# Patient Record
Sex: MALE | Race: WHITE | Employment: FULL TIME | URBAN - METROPOLITAN AREA
[De-identification: names, ages, dates, MRNs, and addresses within clinical notes are randomized per-mention and may not be internally consistent; named-entity substitution may affect disease eponyms.]

---

## 2017-01-05 ENCOUNTER — LAB ENCOUNTER (OUTPATIENT)
Dept: LAB | Age: 47
End: 2017-01-05
Attending: INTERNAL MEDICINE
Payer: COMMERCIAL

## 2017-01-05 ENCOUNTER — PRIOR ORIGINAL RECORDS (OUTPATIENT)
Dept: OTHER | Age: 47
End: 2017-01-05

## 2017-01-05 DIAGNOSIS — E78.00 PURE HYPERCHOLESTEROLEMIA: Primary | ICD-10-CM

## 2017-01-05 LAB
ALBUMIN SERPL-MCNC: 4.2 G/DL (ref 3.5–4.8)
ALP LIVER SERPL-CCNC: 77 U/L (ref 45–117)
ALT SERPL-CCNC: 43 U/L (ref 17–63)
AST SERPL-CCNC: 24 U/L (ref 15–41)
BILIRUB SERPL-MCNC: 0.4 MG/DL (ref 0.1–2)
BUN BLD-MCNC: 18 MG/DL (ref 8–20)
CALCIUM BLD-MCNC: 9.3 MG/DL (ref 8.3–10.3)
CHLORIDE: 106 MMOL/L (ref 101–111)
CHOLEST SMN-MCNC: 187 MG/DL (ref ?–200)
CO2: 27 MMOL/L (ref 22–32)
CREAT BLD-MCNC: 0.97 MG/DL (ref 0.7–1.3)
GLUCOSE BLD-MCNC: 99 MG/DL (ref 70–99)
HDLC SERPL-MCNC: 51 MG/DL (ref 45–?)
HDLC SERPL: 3.67 {RATIO} (ref ?–4.97)
LDLC SERPL CALC-MCNC: 107 MG/DL (ref ?–130)
M PROTEIN MFR SERPL ELPH: 7.2 G/DL (ref 6.1–8.3)
NONHDLC SERPL-MCNC: 136 MG/DL (ref ?–130)
POTASSIUM SERPL-SCNC: 4.3 MMOL/L (ref 3.6–5.1)
SODIUM SERPL-SCNC: 138 MMOL/L (ref 136–144)
TRIGLYCERIDES: 144 MG/DL (ref ?–150)
VLDL: 29 MG/DL (ref 5–40)

## 2017-01-05 PROCEDURE — 80053 COMPREHEN METABOLIC PANEL: CPT

## 2017-01-05 PROCEDURE — 36415 COLL VENOUS BLD VENIPUNCTURE: CPT

## 2017-01-05 PROCEDURE — 80061 LIPID PANEL: CPT

## 2017-01-06 ENCOUNTER — PRIOR ORIGINAL RECORDS (OUTPATIENT)
Dept: OTHER | Age: 47
End: 2017-01-06

## 2017-01-06 LAB
ALKALINE PHOSPHATATE(ALK PHOS): 77 IU/L
BILIRUBIN TOTAL: 0.4 MG/DL
BUN: 18 MG/DL
CALCIUM: 9.3 MG/DL
CHLORIDE: 106 MEQ/L
CHOLESTEROL, TOTAL: 187 MG/DL
CREATININE, SERUM: 0.97 MG/DL
GLUCOSE: 99 MG/DL
HDL CHOLESTEROL: 51 MG/DL
LDL CHOLESTEROL: 107 MG/DL
POTASSIUM, SERUM: 4.3 MEQ/L
PROTEIN, TOTAL: 7.2 G/DL
SGOT (AST): 24 IU/L
SGPT (ALT): 43 IU/L
SODIUM: 138 MEQ/L
TRIGLYCERIDES: 144 MG/DL

## 2017-01-09 ENCOUNTER — PRIOR ORIGINAL RECORDS (OUTPATIENT)
Dept: OTHER | Age: 47
End: 2017-01-09

## 2017-01-12 ENCOUNTER — PRIOR ORIGINAL RECORDS (OUTPATIENT)
Dept: OTHER | Age: 47
End: 2017-01-12

## 2017-01-13 ENCOUNTER — PRIOR ORIGINAL RECORDS (OUTPATIENT)
Dept: OTHER | Age: 47
End: 2017-01-13

## 2017-03-13 ENCOUNTER — OFFICE VISIT (OUTPATIENT)
Dept: FAMILY MEDICINE CLINIC | Facility: CLINIC | Age: 47
End: 2017-03-13

## 2017-03-13 VITALS
DIASTOLIC BLOOD PRESSURE: 100 MMHG | HEART RATE: 64 BPM | RESPIRATION RATE: 18 BRPM | OXYGEN SATURATION: 98 % | BODY MASS INDEX: 37 KG/M2 | SYSTOLIC BLOOD PRESSURE: 170 MMHG | WEIGHT: 285 LBS | TEMPERATURE: 98 F

## 2017-03-13 DIAGNOSIS — J40 BRONCHITIS: Primary | ICD-10-CM

## 2017-03-13 DIAGNOSIS — Z95.1 S/P CABG X 4: ICD-10-CM

## 2017-03-13 DIAGNOSIS — E66.01 MORBID OBESITY DUE TO EXCESS CALORIES (HCC): ICD-10-CM

## 2017-03-13 DIAGNOSIS — I10 ESSENTIAL HYPERTENSION: ICD-10-CM

## 2017-03-13 PROCEDURE — 99214 OFFICE O/P EST MOD 30 MIN: CPT | Performed by: FAMILY MEDICINE

## 2017-03-13 RX ORDER — METOPROLOL SUCCINATE 50 MG/1
50 TABLET, EXTENDED RELEASE ORAL DAILY
Qty: 30 TABLET | Refills: 3 | Status: SHIPPED | OUTPATIENT
Start: 2017-03-13 | End: 2017-04-04

## 2017-03-13 RX ORDER — AZITHROMYCIN 250 MG/1
TABLET, FILM COATED ORAL
Qty: 6 TABLET | Refills: 0 | Status: SHIPPED | OUTPATIENT
Start: 2017-03-13 | End: 2017-04-18 | Stop reason: ALTCHOICE

## 2017-03-13 NOTE — PROGRESS NOTES
HPI:   Frantz Thayer is a 51year old male who presents for upper respiratory symptoms for  2  weeks.  Patient reports sore throat, congestion, clear and yellow colored nasal discharge, low grade fever, dry cough, chest pain from coughing, sinus pain, antony History    APPENDECTOMY      CABG      Comment On 8/16/13: CABG x 4 with LIMA to LAD and SVG to diagonal, OM and PDA    CARDIAC CATH LAB      Comment On 8/14/2013: cardiac cath showed 3-vessel disease      Family History   Problem Relation Age of Onset   • 3    3. Morbid obesity due to excess calories (HCC)  -diet controlled, stable, BP control. 4. S/P CABG x 4  -stable, supportive care, labs in 1-2 months. The patient indicates understanding of these issues and agrees to the plan.   The patient is

## 2017-03-17 ENCOUNTER — NURSE ONLY (OUTPATIENT)
Dept: FAMILY MEDICINE CLINIC | Facility: CLINIC | Age: 47
End: 2017-03-17

## 2017-03-17 VITALS — DIASTOLIC BLOOD PRESSURE: 80 MMHG | SYSTOLIC BLOOD PRESSURE: 122 MMHG

## 2017-04-04 DIAGNOSIS — I10 ESSENTIAL HYPERTENSION: Primary | ICD-10-CM

## 2017-04-04 RX ORDER — METOPROLOL SUCCINATE 50 MG/1
50 TABLET, EXTENDED RELEASE ORAL DAILY
Qty: 90 TABLET | Refills: 1 | Status: SHIPPED | OUTPATIENT
Start: 2017-04-04 | End: 2017-09-30

## 2017-04-18 ENCOUNTER — OFFICE VISIT (OUTPATIENT)
Dept: FAMILY MEDICINE CLINIC | Facility: CLINIC | Age: 47
End: 2017-04-18

## 2017-04-18 VITALS
HEART RATE: 70 BPM | WEIGHT: 289 LBS | BODY MASS INDEX: 37 KG/M2 | SYSTOLIC BLOOD PRESSURE: 130 MMHG | TEMPERATURE: 98 F | DIASTOLIC BLOOD PRESSURE: 80 MMHG | OXYGEN SATURATION: 98 % | RESPIRATION RATE: 16 BRPM

## 2017-04-18 DIAGNOSIS — K52.9 ACUTE GASTROENTERITIS: Primary | ICD-10-CM

## 2017-04-18 PROCEDURE — 99213 OFFICE O/P EST LOW 20 MIN: CPT | Performed by: EMERGENCY MEDICINE

## 2017-04-18 RX ORDER — ATORVASTATIN CALCIUM 20 MG/1
TABLET, FILM COATED ORAL
Refills: 1 | COMMUNITY
Start: 2017-04-08 | End: 2018-04-07

## 2017-04-18 RX ORDER — ONDANSETRON 4 MG/1
TABLET, ORALLY DISINTEGRATING ORAL EVERY 8 HOURS PRN
Qty: 20 TABLET | Refills: 0 | Status: SHIPPED | OUTPATIENT
Start: 2017-04-18 | End: 2017-04-25

## 2017-04-18 NOTE — PROGRESS NOTES
Chief Complaint:   Patient presents with:  Abdominal Pain: Diareha, Weakness,     HPI:   This is a 51year old male C/O 1 day history of diarrhea. Burping more. Mild nausea. No emesis.   Generalized fatigue and  energy  No fever but felt feverish  + daily. Disp: 30 tablet Rfl: 0   [DISCONTINUED] Rivaroxaban (XARELTO) 20 MG Oral Tab Take 1 tablet by mouth daily. Disp: 30 tablet Rfl: 0     No current facility-administered medications on file prior to visit.     Allergies   No Known Allergies    Current O visit:    Acute gastroenteritis  -     ondansetron 4 MG Oral Tablet Dispersible; Take 1-2 tablets (4-8 mg total) by mouth every 8 (eight) hours as needed. plan: Patient is a 58-year-old male presents today with acute viral gastroenteritis.   Appears cli

## 2017-04-18 NOTE — PATIENT INSTRUCTIONS
Push fluids and rest  Bananas rice apple sauce toast diet and advance as tolerated  To Er  If worse like high fever weakness severe abdominal pain etc.  May take OTC Tylenol or Motrin for body aches   May take OTC Imodium for symptoms          Treating Chelsea urine)  Date Last Reviewed: 7/1/2016  © 3432-1693 [facility] [address] [place] [address], [address] [place], [place] [address]. All rights reserved. This information is not intended as a substitute for professional medical care.  Always follow your healthcare profe

## 2017-05-12 ENCOUNTER — HOSPITAL ENCOUNTER (OUTPATIENT)
Dept: GENERAL RADIOLOGY | Age: 47
Discharge: HOME | End: 2017-05-12
Attending: PHYSICIAN ASSISTANT
Payer: COMMERCIAL

## 2017-05-12 ENCOUNTER — OFFICE VISIT (OUTPATIENT)
Dept: FAMILY MEDICINE CLINIC | Facility: CLINIC | Age: 47
End: 2017-05-12

## 2017-05-12 VITALS
TEMPERATURE: 98 F | RESPIRATION RATE: 16 BRPM | HEART RATE: 72 BPM | SYSTOLIC BLOOD PRESSURE: 120 MMHG | WEIGHT: 281 LBS | OXYGEN SATURATION: 97 % | DIASTOLIC BLOOD PRESSURE: 78 MMHG | BODY MASS INDEX: 36 KG/M2

## 2017-05-12 DIAGNOSIS — J20.9 ACUTE BRONCHITIS, UNSPECIFIED ORGANISM: Primary | ICD-10-CM

## 2017-05-12 DIAGNOSIS — M79.672 PAIN IN LEFT FOOT: ICD-10-CM

## 2017-05-12 PROCEDURE — 73630 X-RAY EXAM OF FOOT: CPT | Performed by: PHYSICIAN ASSISTANT

## 2017-05-12 PROCEDURE — 99214 OFFICE O/P EST MOD 30 MIN: CPT | Performed by: PHYSICIAN ASSISTANT

## 2017-05-12 RX ORDER — AZITHROMYCIN 250 MG/1
TABLET, FILM COATED ORAL
Qty: 6 TABLET | Refills: 0 | Status: SHIPPED | OUTPATIENT
Start: 2017-05-12 | End: 2017-06-06

## 2017-05-12 RX ORDER — PROMETHAZINE HYDROCHLORIDE AND CODEINE PHOSPHATE 6.25; 1 MG/5ML; MG/5ML
5 SYRUP ORAL NIGHTLY PRN
Qty: 100 ML | Refills: 0 | Status: SHIPPED | OUTPATIENT
Start: 2017-05-12 | End: 2017-06-06

## 2017-05-12 NOTE — PROGRESS NOTES
CHIEF COMPLAINT:   Patient presents with:  Cough: foot    HPI:   Daylin Flower. is a 51year old male who presents for lower respiratory symptoms for  1 weeks.  Patient reports cough with thick, brownish yellow colored sputum, cough is keeping pt up at Dx in 8/2013: CT chest showed small bilateral fissural-based lung nodules less than 1 cm          Past Surgical History    APPENDECTOMY      CABG      Comment On 8/16/13: CABG x 4 with LIMA to LAD and SVG to diagonal, OM and PDA    CARDIAC CATH LAB LYMPH: No lymphadenopathy. ASSESSMENT AND PLAN:   Sol Gonzalez. is a 51year old male who presents with     ASSESSMENT: Acute bronchitis, unspecified organism  (primary encounter diagnosis)  Pain in left foot     Xray of left foot- 1.  Osseous st Bronchitis often occurs with a cold or the flu virus. The airways become inflamed (red and swollen). There is a deep “hacking” cough from the extra mucus.  Other symptoms may include:  · Wheezing  · Chest discomfort  · Shortness of breath  · Mild fever  A s · Ask your provider about using cough medicine, pain and fever medicine, or a decongestant. Antibiotics  Most cases of bronchitis are caused by cold or flu viruses. Antibiotics don’t treat viral illness.  Taking antibiotics when they are not needed increas

## 2017-06-06 ENCOUNTER — OFFICE VISIT (OUTPATIENT)
Dept: FAMILY MEDICINE CLINIC | Facility: CLINIC | Age: 47
End: 2017-06-06

## 2017-06-06 VITALS
SYSTOLIC BLOOD PRESSURE: 120 MMHG | HEART RATE: 60 BPM | RESPIRATION RATE: 16 BRPM | DIASTOLIC BLOOD PRESSURE: 78 MMHG | TEMPERATURE: 99 F | BODY MASS INDEX: 36.06 KG/M2 | HEIGHT: 74 IN | WEIGHT: 281 LBS

## 2017-06-06 DIAGNOSIS — J20.8 ACUTE BRONCHITIS DUE TO OTHER SPECIFIED ORGANISMS: ICD-10-CM

## 2017-06-06 DIAGNOSIS — Z13.228 SCREENING FOR ENDOCRINE, NUTRITIONAL, METABOLIC AND IMMUNITY DISORDER: Primary | ICD-10-CM

## 2017-06-06 DIAGNOSIS — Z13.21 SCREENING FOR ENDOCRINE, NUTRITIONAL, METABOLIC AND IMMUNITY DISORDER: Primary | ICD-10-CM

## 2017-06-06 DIAGNOSIS — M77.9 BONE SPUR: ICD-10-CM

## 2017-06-06 DIAGNOSIS — Z13.29 SCREENING FOR ENDOCRINE, NUTRITIONAL, METABOLIC AND IMMUNITY DISORDER: Primary | ICD-10-CM

## 2017-06-06 DIAGNOSIS — R60.0 EDEMA OF LEFT LOWER EXTREMITY: ICD-10-CM

## 2017-06-06 DIAGNOSIS — Z13.0 SCREENING FOR ENDOCRINE, NUTRITIONAL, METABOLIC AND IMMUNITY DISORDER: Primary | ICD-10-CM

## 2017-06-06 PROCEDURE — 99214 OFFICE O/P EST MOD 30 MIN: CPT | Performed by: FAMILY MEDICINE

## 2017-06-06 RX ORDER — METHYLPREDNISOLONE 4 MG/1
TABLET ORAL
Qty: 1 KIT | Refills: 0 | Status: SHIPPED | OUTPATIENT
Start: 2017-06-06 | End: 2017-07-06 | Stop reason: ALTCHOICE

## 2017-06-06 RX ORDER — DICLOFENAC SODIUM 75 MG/1
75 TABLET, DELAYED RELEASE ORAL 2 TIMES DAILY PRN
Qty: 60 TABLET | Refills: 1 | Status: SHIPPED | OUTPATIENT
Start: 2017-06-06 | End: 2017-08-06

## 2017-06-07 NOTE — PROGRESS NOTES
Chief Complaint:   Patient presents with:  Cough: Still present   Foot Pain: Still having issues     HPI:   This is a 51year old male presenting for follow-up after he was diagnosed with bronchitis.   Patient has a history of chronic bronchitis recently wa Family History:  Family History   Problem Relation Age of Onset   • Diabetes Neg      Allergies:  No Known Allergies  Current Meds:    Current Outpatient Prescriptions:  methylPREDNISolone (MEDROL) 4 MG Oral Tablet Therapy Pack As directed.  Disp: 1 kit Negative for dizziness, weakness, light-headedness and headaches. Hematological: Negative for adenopathy. Does not bruise/bleed easily. Psychiatric/Behavioral: Negative for suicidal ideas, behavioral problems, sleep disturbance and agitation.  The patie noted. He is not diaphoretic. Psychiatric: He has a normal mood and affect. His behavior is normal. Judgment and thought content normal.   left heel tenderness, mild swelling to area, with radiation to left posterior leg.      ASSESSMENT AND PLAN:   Diagn

## 2017-06-30 ENCOUNTER — HOSPITAL ENCOUNTER (OUTPATIENT)
Dept: ULTRASOUND IMAGING | Facility: HOSPITAL | Age: 47
Discharge: HOME | End: 2017-06-30
Attending: FAMILY MEDICINE
Payer: COMMERCIAL

## 2017-06-30 ENCOUNTER — TELEPHONE (OUTPATIENT)
Dept: FAMILY MEDICINE CLINIC | Facility: CLINIC | Age: 47
End: 2017-06-30

## 2017-06-30 DIAGNOSIS — R60.0 EDEMA OF LEFT LOWER EXTREMITY: ICD-10-CM

## 2017-06-30 DIAGNOSIS — I70.90 ATHEROSCLEROSIS: Primary | ICD-10-CM

## 2017-06-30 PROCEDURE — 93971 EXTREMITY STUDY: CPT | Performed by: FAMILY MEDICINE

## 2017-06-30 PROCEDURE — 93923 UPR/LXTR ART STDY 3+ LVLS: CPT | Performed by: FAMILY MEDICINE

## 2017-06-30 NOTE — TELEPHONE ENCOUNTER
----- Message from Ronnie Leone MD sent at 6/30/2017  1:09 PM CDT -----  Patient's noted to have atherosclerosis, please refer to Dr. Jairon Newell, vascular.

## 2017-06-30 NOTE — TELEPHONE ENCOUNTER
Left a message for patient to call office regarding the referral, but informed him we would check back next week with him if we do not hear back from him today.

## 2017-06-30 NOTE — TELEPHONE ENCOUNTER
Discussed results and referral with patient.   Information for Dr. Herber Patel sent to him via my chart per his request.

## 2017-07-05 ENCOUNTER — TELEPHONE (OUTPATIENT)
Dept: FAMILY MEDICINE CLINIC | Facility: CLINIC | Age: 47
End: 2017-07-05

## 2017-07-05 NOTE — TELEPHONE ENCOUNTER
Dr. Olivia Donaldson.  [facility] [address]  Vascular surgery   [address] [place] [address], [place], [address] [place] [address]   [phone]

## 2017-07-05 NOTE — TELEPHONE ENCOUNTER
Pt informed and info sent thru [address] Pt states he still has his wet,wheezy, yellow foamy cough. Pr informed he needs to be seen, its been a month, no better, little worse. Offered appts today and [facility]. Pt scheduled for tomorrow.

## 2017-07-06 ENCOUNTER — OFFICE VISIT (OUTPATIENT)
Dept: FAMILY MEDICINE CLINIC | Facility: CLINIC | Age: 47
End: 2017-07-06

## 2017-07-06 ENCOUNTER — HOSPITAL ENCOUNTER (OUTPATIENT)
Dept: GENERAL RADIOLOGY | Age: 47
Discharge: HOME | End: 2017-07-06
Attending: FAMILY MEDICINE
Payer: COMMERCIAL

## 2017-07-06 VITALS
SYSTOLIC BLOOD PRESSURE: 137 MMHG | OXYGEN SATURATION: 96 % | BODY MASS INDEX: 36.06 KG/M2 | WEIGHT: 281 LBS | RESPIRATION RATE: 16 BRPM | HEART RATE: 62 BPM | DIASTOLIC BLOOD PRESSURE: 84 MMHG | TEMPERATURE: 99 F | HEIGHT: 74 IN

## 2017-07-06 DIAGNOSIS — R06.09 OTHER FORM OF DYSPNEA: ICD-10-CM

## 2017-07-06 DIAGNOSIS — J20.9 BRONCHITIS, ACUTE, WITH BRONCHOSPASM: ICD-10-CM

## 2017-07-06 DIAGNOSIS — E78.5 HYPERLIPIDEMIA WITH TARGET LOW DENSITY LIPOPROTEIN (LDL) CHOLESTEROL LESS THAN 70 MG/DL: ICD-10-CM

## 2017-07-06 DIAGNOSIS — J20.9 BRONCHITIS, ACUTE, WITH BRONCHOSPASM: Primary | ICD-10-CM

## 2017-07-06 PROCEDURE — 99214 OFFICE O/P EST MOD 30 MIN: CPT | Performed by: FAMILY MEDICINE

## 2017-07-06 PROCEDURE — 71020 XR CHEST PA + LAT CHEST (CPT=71020): CPT | Performed by: FAMILY MEDICINE

## 2017-07-06 RX ORDER — AMOXICILLIN AND CLAVULANATE POTASSIUM 875; 125 MG/1; MG/1
1 TABLET, FILM COATED ORAL 2 TIMES DAILY
Qty: 20 TABLET | Refills: 0 | Status: SHIPPED | OUTPATIENT
Start: 2017-07-06 | End: 2017-07-16

## 2017-07-07 ENCOUNTER — TELEPHONE (OUTPATIENT)
Dept: FAMILY MEDICINE CLINIC | Facility: CLINIC | Age: 47
End: 2017-07-07

## 2017-07-07 NOTE — TELEPHONE ENCOUNTER
I called pt at [phone] and verified 2 patient identifiers: name & . I discussed results and recommendations at length with patient. All questions answered.

## 2017-07-07 NOTE — PROGRESS NOTES
Chief Complaint:   Patient presents with:  URI: mary. Pt states he feels worse once antibotic was finish    HPI:   This is a 51year old male presenting for follow up, minimal improvement with zithromax and oral steroids.  He reports no recent fevers or chi Packs/day: 1.00      Years: 27.00        Types: Cigarettes     Quit date: 8/15/2013  Smokeless tobacco: Never Used                      Alcohol use:  No              Family History:  Family History   Problem Relation Age of Onset   • Diabetes Neg pallor, rash and wound. Allergic/Immunologic: Negative for environmental allergies, food allergies and immunocompromised state. Neurological: Negative for dizziness, vertigo, weakness, light-headedness, numbness and headaches.    Hematological: Negative He has no cervical adenopathy. Neurological: He is alert and oriented to person, place, and time. No cranial nerve deficit or motor deficit. Gait normal.   Skin: Skin is warm and dry. No lesion and no rash noted. He is not diaphoretic.    Psychiatric: He 187 RIGHT TIBIALIS POSTERIOR:    191 RIGHT DORSALIS PEDIS:        197 RIGHT GREAT TOE:             139 RIGHT ANKLE/BRACHIAL INDEX:  1.42 RIGHT TOE/BRACHIAL INDEX:    1.00  LEFT BRACHIAL:               137 LEFT UPPER THIGH:            Greater than 2 involving the left lower extremity. Dictated by: Judy Huber MD on 6/30/2017 at 8:46     Approved by: Judy Huber MD          -     XR CHEST PA + LAT CHEST (CPT=71020); Future  -     Amoxicillin-Pot Clavulanate 875-125 MG Oral Tab;  Take 1 tablet by mouth

## 2017-07-18 ENCOUNTER — APPOINTMENT (OUTPATIENT)
Dept: CT IMAGING | Age: 47
End: 2017-07-18
Attending: EMERGENCY MEDICINE
Payer: COMMERCIAL

## 2017-07-18 ENCOUNTER — APPOINTMENT (OUTPATIENT)
Dept: GENERAL RADIOLOGY | Age: 47
End: 2017-07-18
Attending: EMERGENCY MEDICINE
Payer: COMMERCIAL

## 2017-07-18 ENCOUNTER — HOSPITAL ENCOUNTER (EMERGENCY)
Age: 47
Discharge: HOME | End: 2017-07-18
Attending: EMERGENCY MEDICINE
Payer: COMMERCIAL

## 2017-07-18 VITALS
WEIGHT: 280 LBS | HEIGHT: 74 IN | HEART RATE: 60 BPM | SYSTOLIC BLOOD PRESSURE: 138 MMHG | DIASTOLIC BLOOD PRESSURE: 70 MMHG | RESPIRATION RATE: 18 BRPM | OXYGEN SATURATION: 98 % | TEMPERATURE: 98 F | BODY MASS INDEX: 35.94 KG/M2

## 2017-07-18 DIAGNOSIS — R19.7 DIARRHEA, UNSPECIFIED TYPE: ICD-10-CM

## 2017-07-18 DIAGNOSIS — R10.30 LOWER ABDOMINAL PAIN: Primary | ICD-10-CM

## 2017-07-18 LAB
ALBUMIN SERPL-MCNC: 3.8 G/DL (ref 3.5–4.8)
ALP LIVER SERPL-CCNC: 85 U/L (ref 45–117)
ALT SERPL-CCNC: 56 U/L (ref 17–63)
AST SERPL-CCNC: 34 U/L (ref 15–41)
BASOPHILS # BLD AUTO: 0.04 X10(3) UL (ref 0–0.1)
BASOPHILS NFR BLD AUTO: 0.5 %
BILIRUB SERPL-MCNC: 0.3 MG/DL (ref 0.1–2)
BILIRUB UR QL STRIP.AUTO: NEGATIVE
BUN BLD-MCNC: 21 MG/DL (ref 8–20)
CALCIUM BLD-MCNC: 8.7 MG/DL (ref 8.3–10.3)
CHLORIDE: 107 MMOL/L (ref 101–111)
CLARITY UR REFRACT.AUTO: CLEAR
CO2: 27 MMOL/L (ref 22–32)
COLOR UR AUTO: YELLOW
CREAT BLD-MCNC: 0.93 MG/DL (ref 0.7–1.3)
EOSINOPHIL # BLD AUTO: 0.28 X10(3) UL (ref 0–0.3)
EOSINOPHIL NFR BLD AUTO: 3.5 %
ERYTHROCYTE [DISTWIDTH] IN BLOOD BY AUTOMATED COUNT: 12.8 % (ref 11.5–16)
GLUCOSE BLD-MCNC: 124 MG/DL (ref 70–99)
GLUCOSE UR STRIP.AUTO-MCNC: NEGATIVE MG/DL
HCT VFR BLD AUTO: 40.1 % (ref 37–53)
HGB BLD-MCNC: 13.3 G/DL (ref 13–17)
IMMATURE GRANULOCYTE COUNT: 0.07 X10(3) UL (ref 0–1)
IMMATURE GRANULOCYTE RATIO %: 0.9 %
KETONES UR STRIP.AUTO-MCNC: NEGATIVE MG/DL
LEUKOCYTE ESTERASE UR QL STRIP.AUTO: NEGATIVE
LIPASE: 482 U/L (ref 73–393)
LYMPHOCYTES # BLD AUTO: 1.94 X10(3) UL (ref 0.9–4)
LYMPHOCYTES NFR BLD AUTO: 24 %
M PROTEIN MFR SERPL ELPH: 6.6 G/DL (ref 6.1–8.3)
MCH RBC QN AUTO: 30.4 PG (ref 27–33.2)
MCHC RBC AUTO-ENTMCNC: 33.2 G/DL (ref 31–37)
MCV RBC AUTO: 91.8 FL (ref 80–99)
MONOCYTES # BLD AUTO: 0.46 X10(3) UL (ref 0.1–0.6)
MONOCYTES NFR BLD AUTO: 5.7 %
NEUTROPHIL ABS PRELIM: 5.31 X10 (3) UL (ref 1.3–6.7)
NEUTROPHILS # BLD AUTO: 5.31 X10(3) UL (ref 1.3–6.7)
NEUTROPHILS NFR BLD AUTO: 65.4 %
NITRITE UR QL STRIP.AUTO: NEGATIVE
PH UR STRIP.AUTO: 7 [PH] (ref 4.5–8)
PLATELET # BLD AUTO: 227 10(3)UL (ref 150–450)
POTASSIUM SERPL-SCNC: 3.7 MMOL/L (ref 3.6–5.1)
PRO-BETA NATRIURETIC PEPTIDE: 27 PG/ML (ref ?–125)
PROT UR STRIP.AUTO-MCNC: NEGATIVE MG/DL
RBC # BLD AUTO: 4.37 X10(6)UL (ref 4.3–5.7)
RBC UR QL AUTO: NEGATIVE
RED CELL DISTRIBUTION WIDTH-SD: 42.9 FL (ref 35.1–46.3)
SODIUM SERPL-SCNC: 141 MMOL/L (ref 136–144)
SP GR UR STRIP.AUTO: 1.02 (ref 1–1.03)
TROPONIN: <0.046 NG/ML (ref ?–0.05)
UROBILINOGEN UR STRIP.AUTO-MCNC: 0.2 MG/DL
WBC # BLD AUTO: 8.1 X10(3) UL (ref 4–13)

## 2017-07-18 PROCEDURE — 80053 COMPREHEN METABOLIC PANEL: CPT | Performed by: EMERGENCY MEDICINE

## 2017-07-18 PROCEDURE — 84484 ASSAY OF TROPONIN QUANT: CPT | Performed by: EMERGENCY MEDICINE

## 2017-07-18 PROCEDURE — 99285 EMERGENCY DEPT VISIT HI MDM: CPT

## 2017-07-18 PROCEDURE — 83880 ASSAY OF NATRIURETIC PEPTIDE: CPT | Performed by: EMERGENCY MEDICINE

## 2017-07-18 PROCEDURE — 93005 ELECTROCARDIOGRAM TRACING: CPT

## 2017-07-18 PROCEDURE — 74177 CT ABD & PELVIS W/CONTRAST: CPT | Performed by: EMERGENCY MEDICINE

## 2017-07-18 PROCEDURE — 83690 ASSAY OF LIPASE: CPT | Performed by: EMERGENCY MEDICINE

## 2017-07-18 PROCEDURE — 85025 COMPLETE CBC W/AUTO DIFF WBC: CPT | Performed by: EMERGENCY MEDICINE

## 2017-07-18 PROCEDURE — 81003 URINALYSIS AUTO W/O SCOPE: CPT | Performed by: EMERGENCY MEDICINE

## 2017-07-18 PROCEDURE — 93010 ELECTROCARDIOGRAM REPORT: CPT

## 2017-07-18 PROCEDURE — 96360 HYDRATION IV INFUSION INIT: CPT

## 2017-07-18 PROCEDURE — 71020 XR CHEST PA + LAT CHEST (CPT=71020): CPT | Performed by: EMERGENCY MEDICINE

## 2017-07-18 RX ORDER — DICYCLOMINE HCL 20 MG
20 TABLET ORAL 4 TIMES DAILY PRN
Qty: 30 TABLET | Refills: 0 | Status: SHIPPED | OUTPATIENT
Start: 2017-07-18 | End: 2017-08-17

## 2017-07-18 NOTE — ED PROVIDER NOTES
Patient Seen in: THE [facility] Emergency Department In [place]    History   Patient presents with:  Abdomen/Flank Pain (GI/)    Stated Complaint: ABD pain x 1-2 weeks, pt on antibiotics for \"cough\", pain worse upon eating     HPI    Patient is a 52-year- CABG x 4 with LIMA to LAD and SVG to diagonal, OM and PDA       Past Surgical History:  No date: APPENDECTOMY  No date: CABG      Comment: On 8/16/13: CABG x 4 with LIMA to LAD and SVG                to diagonal, OM and PDA  No date: CARDIAC CATH LAB SpO2 98%   BMI 35.95 kg/m²         Physical Exam    General: Patient is awake, alert in no acute distress. HEENT: Sclera are not icteric. Conjunctivae within normal limits.   Mucous members are moist.   Cardiovascular: Regular rate and rhythm, normal S1- pain worse upon eating  COMPARISON:  Mckenna [facility], XR CHEST PA + LAT CHEST (CPT=71020), 7/06/2017, 13:39.  TECHNIQUE:  PA and lateral chest radiographs were obtained.   PATIENT STATED HISTORY: (As transcribed by Technologist)  For past 2 weeks prodcutiv tree was performed. Pressure measurements were obtained of the lower extremities.   FINDINGS:  Pressures, in mm Hg, are:  RIGHT BRACHIAL:              139 RIGHT UPPER THIGH:           Greater than 254, noncompressible RIGHT LOWER THIGH:           Greater t Left lower extremity SAPHENOFEMORAL JUNCTION:  No reflux. THROMBI:  None visible. COMPRESSION:  Normal compressibility, phasicity, and augmentation. OTHER:  Incidentally noted is a duplicated left mid superficial femoral vein. CONCLUSION:  1.  No sono diverticulitis. Status post appendectomy. No free fluid. ABDOMINAL WALL:  No mass or hernia. URINARY BLADDER:  No visible focal wall thickening, lesion, or calculus. PELVIC NODES:  No adenopathy.   PELVIC ORGANS:  Pelvic organs appropriate for patient ag type    Disposition:  Discharge    Follow-up:  Edgardo Guerrero 58 [address] [facility] [phone]    Schedule an appointment as soon as possible for a visit in 2 days      Raymundo Wing, [address] [facility] [address] [facility]

## 2017-07-18 NOTE — ED INITIAL ASSESSMENT (HPI)
REPORTS UPPER ABD PAIN FOR PAST 7 DAYS THAT OCCURES WHEN HE EATS.   URI SYMPTOMS AND IS NOW ON AUGMENTIN FOR URI

## 2017-07-19 LAB
ATRIAL RATE: 68 BPM
P AXIS: 20 DEGREES
P-R INTERVAL: 166 MS
Q-T INTERVAL: 392 MS
QRS DURATION: 92 MS
QTC CALCULATION (BEZET): 416 MS
R AXIS: 2 DEGREES
T AXIS: 52 DEGREES
VENTRICULAR RATE: 68 BPM

## 2017-07-26 ENCOUNTER — LAB ENCOUNTER (OUTPATIENT)
Dept: LAB | Age: 47
End: 2017-07-26
Attending: FAMILY MEDICINE
Payer: COMMERCIAL

## 2017-07-26 ENCOUNTER — PRIOR ORIGINAL RECORDS (OUTPATIENT)
Dept: OTHER | Age: 47
End: 2017-07-26

## 2017-07-26 DIAGNOSIS — Z13.0 SCREENING FOR ENDOCRINE, NUTRITIONAL, METABOLIC AND IMMUNITY DISORDER: ICD-10-CM

## 2017-07-26 DIAGNOSIS — Z13.21 SCREENING FOR ENDOCRINE, NUTRITIONAL, METABOLIC AND IMMUNITY DISORDER: ICD-10-CM

## 2017-07-26 DIAGNOSIS — E78.00 PURE HYPERCHOLESTEROLEMIA: Primary | ICD-10-CM

## 2017-07-26 DIAGNOSIS — Z13.29 SCREENING FOR ENDOCRINE, NUTRITIONAL, METABOLIC AND IMMUNITY DISORDER: ICD-10-CM

## 2017-07-26 DIAGNOSIS — Z13.228 SCREENING FOR ENDOCRINE, NUTRITIONAL, METABOLIC AND IMMUNITY DISORDER: ICD-10-CM

## 2017-07-26 LAB
ALBUMIN SERPL-MCNC: 3.7 G/DL (ref 3.5–4.8)
ALP LIVER SERPL-CCNC: 66 U/L (ref 45–117)
ALT SERPL-CCNC: 68 U/L (ref 17–63)
AST SERPL-CCNC: 31 U/L (ref 15–41)
BASOPHILS # BLD AUTO: 0.03 X10(3) UL (ref 0–0.1)
BASOPHILS NFR BLD AUTO: 0.5 %
BILIRUB SERPL-MCNC: 0.5 MG/DL (ref 0.1–2)
BUN BLD-MCNC: 19 MG/DL (ref 8–20)
CALCIUM BLD-MCNC: 8.1 MG/DL (ref 8.3–10.3)
CHLORIDE: 110 MMOL/L (ref 101–111)
CHOLEST SMN-MCNC: 140 MG/DL (ref ?–200)
CO2: 24 MMOL/L (ref 22–32)
COMPLEXED PSA SERPL-MCNC: 0.68 NG/ML (ref 0.01–4)
CREAT BLD-MCNC: 0.79 MG/DL (ref 0.7–1.3)
EOSINOPHIL # BLD AUTO: 0.23 X10(3) UL (ref 0–0.3)
EOSINOPHIL NFR BLD AUTO: 4.2 %
ERYTHROCYTE [DISTWIDTH] IN BLOOD BY AUTOMATED COUNT: 13.1 % (ref 11.5–16)
GLUCOSE BLD-MCNC: 100 MG/DL (ref 70–99)
HCT VFR BLD AUTO: 40.5 % (ref 37–53)
HDLC SERPL-MCNC: 54 MG/DL (ref 45–?)
HDLC SERPL: 2.59 {RATIO} (ref ?–4.97)
HGB BLD-MCNC: 13 G/DL (ref 13–17)
IMMATURE GRANULOCYTE COUNT: 0.05 X10(3) UL (ref 0–1)
IMMATURE GRANULOCYTE RATIO %: 0.9 %
LDLC SERPL CALC-MCNC: 62 MG/DL (ref ?–130)
LDLC SERPL-MCNC: 24 MG/DL (ref 5–40)
LYMPHOCYTES # BLD AUTO: 1.16 X10(3) UL (ref 0.9–4)
LYMPHOCYTES NFR BLD AUTO: 21.1 %
M PROTEIN MFR SERPL ELPH: 6.9 G/DL (ref 6.1–8.3)
MCH RBC QN AUTO: 30.2 PG (ref 27–33.2)
MCHC RBC AUTO-ENTMCNC: 32.1 G/DL (ref 31–37)
MCV RBC AUTO: 94.2 FL (ref 80–99)
MONOCYTES # BLD AUTO: 0.39 X10(3) UL (ref 0.1–0.6)
MONOCYTES NFR BLD AUTO: 7.1 %
NEUTROPHIL ABS PRELIM: 3.64 X10 (3) UL (ref 1.3–6.7)
NEUTROPHILS # BLD AUTO: 3.64 X10(3) UL (ref 1.3–6.7)
NEUTROPHILS NFR BLD AUTO: 66.2 %
NONHDLC SERPL-MCNC: 86 MG/DL (ref ?–130)
PLATELET # BLD AUTO: 210 10(3)UL (ref 150–450)
POTASSIUM SERPL-SCNC: 4.2 MMOL/L (ref 3.6–5.1)
RBC # BLD AUTO: 4.3 X10(6)UL (ref 4.3–5.7)
RED CELL DISTRIBUTION WIDTH-SD: 44.9 FL (ref 35.1–46.3)
SODIUM SERPL-SCNC: 141 MMOL/L (ref 136–144)
TRIGLYCERIDES: 120 MG/DL (ref ?–150)
TSI SER-ACNC: 1.2 MIU/ML (ref 0.35–5.5)
WBC # BLD AUTO: 5.5 X10(3) UL (ref 4–13)

## 2017-07-26 PROCEDURE — 84443 ASSAY THYROID STIM HORMONE: CPT | Performed by: FAMILY MEDICINE

## 2017-07-26 PROCEDURE — 84153 ASSAY OF PSA TOTAL: CPT | Performed by: FAMILY MEDICINE

## 2017-07-26 PROCEDURE — 85025 COMPLETE CBC W/AUTO DIFF WBC: CPT | Performed by: FAMILY MEDICINE

## 2017-07-27 ENCOUNTER — TELEPHONE (OUTPATIENT)
Dept: FAMILY MEDICINE CLINIC | Facility: CLINIC | Age: 47
End: 2017-07-27

## 2017-07-27 DIAGNOSIS — R89.9 ABNORMAL LABORATORY TEST: ICD-10-CM

## 2017-07-27 DIAGNOSIS — R74.01 ELEVATED ALT MEASUREMENT: Primary | ICD-10-CM

## 2017-07-27 LAB
ALKALINE PHOSPHATATE(ALK PHOS): 66 IU/L
ALT (SGPT): 68 U/L
AST (SGOT): 31 U/L
BUN: 19 MG/DL
CALCIUM: 8.1 MG/DL
CHLORIDE: 110 MEQ/L
CHOLESTEROL, TOTAL: 140 MG/DL
CREATININE, SERUM: 0.79 MG/DL
GLUCOSE: 100 MG/DL
GLUCOSE: 100 MG/DL
HDL CHOLESTEROL: 54 MG/DL
LDL CHOLESTEROL: 62 MG/DL
POTASSIUM, SERUM: 4.2 MEQ/L
SGOT (AST): 31 IU/L
SGPT (ALT): 68 IU/L
SODIUM: 141 MEQ/L
TRIGLYCERIDES: 120 MG/DL

## 2017-07-27 NOTE — TELEPHONE ENCOUNTER
VM on cell regarding lab results with pt education to increase Ca in diet and repeat CMP in one month, order entered.

## 2017-08-07 RX ORDER — DICLOFENAC SODIUM 75 MG/1
TABLET, DELAYED RELEASE ORAL
Qty: 60 TABLET | Refills: 1 | Status: SHIPPED | OUTPATIENT
Start: 2017-08-07 | End: 2018-02-27

## 2017-08-07 NOTE — TELEPHONE ENCOUNTER
Refill request for diclofenac. No protocol, unable to approve refill. LOV 7/6/2017  6/6/2017 #60 w/ 1. Please advise, thank you.

## 2017-08-18 ENCOUNTER — OFFICE VISIT (OUTPATIENT)
Dept: SURGERY | Facility: CLINIC | Age: 47
End: 2017-08-18

## 2017-08-18 VITALS
HEIGHT: 74 IN | SYSTOLIC BLOOD PRESSURE: 135 MMHG | DIASTOLIC BLOOD PRESSURE: 87 MMHG | BODY MASS INDEX: 35.94 KG/M2 | HEART RATE: 52 BPM | TEMPERATURE: 97 F | WEIGHT: 280 LBS

## 2017-08-18 DIAGNOSIS — I25.10 CORONARY ARTERY DISEASE INVOLVING NATIVE HEART, ANGINA PRESENCE UNSPECIFIED, UNSPECIFIED VESSEL OR LESION TYPE: ICD-10-CM

## 2017-08-18 DIAGNOSIS — Z95.1 S/P CABG X 4: ICD-10-CM

## 2017-08-18 DIAGNOSIS — Z12.11 SCREENING FOR MALIGNANT NEOPLASM OF COLON: Primary | ICD-10-CM

## 2017-08-18 RX ORDER — POLYETHYLENE GLYCOL 3350, SODIUM CHLORIDE, SODIUM BICARBONATE, POTASSIUM CHLORIDE 420; 11.2; 5.72; 1.48 G/4L; G/4L; G/4L; G/4L
POWDER, FOR SOLUTION ORAL
Qty: 1 BOTTLE | Refills: 0 | Status: SHIPPED | OUTPATIENT
Start: 2017-08-18 | End: 2017-10-12

## 2017-08-18 NOTE — H&P
New Patient Visit Note       Active Problems      1. Screening for malignant neoplasm of colon    2. Coronary artery disease involving native heart, angina presence unspecified, unspecified vessel or lesion type    3.  S/P CABG x 4        Chief Complaint Years of education:                 Number of children:               Social History Main Topics    Smoking status: Former Smoker                                                                Packs/day: 1.00      Years: 27.00          Types: problems and sleep disturbance. Physical Findings   /87   Pulse 52   Temp (!) 97.4 °F (36.3 °C)   Ht 74\"   Wt 280 lb   BMI 35.95 kg/m²   Physical Exam   Constitutional: He appears well-developed and well-nourished. No distress.    HENT:   Head:

## 2017-08-31 RX ORDER — DICLOFENAC SODIUM 75 MG/1
TABLET, DELAYED RELEASE ORAL
Qty: 60 TABLET | Refills: 0 | Status: SHIPPED | OUTPATIENT
Start: 2017-08-31 | End: 2017-10-26

## 2017-08-31 NOTE — TELEPHONE ENCOUNTER
Medication failed protocol please approve or deny  LOV- 11/4/2016 physical, 7/6/2017 URI  Last labs- 7/26/2017  Medication last refilled-8/7/2017 x 2 months

## 2017-09-30 DIAGNOSIS — I10 ESSENTIAL HYPERTENSION: ICD-10-CM

## 2017-10-01 RX ORDER — METOPROLOL SUCCINATE 50 MG/1
50 TABLET, EXTENDED RELEASE ORAL DAILY
Qty: 90 TABLET | Refills: 1 | Status: SHIPPED | OUTPATIENT
Start: 2017-10-01 | End: 2018-03-04

## 2017-10-05 ENCOUNTER — TELEPHONE (OUTPATIENT)
Dept: FAMILY MEDICINE CLINIC | Facility: CLINIC | Age: 47
End: 2017-10-05

## 2017-10-05 NOTE — TELEPHONE ENCOUNTER
Patient missed his appointment for left sided arm pit pain LMOM for patient to call back and update on condition or schedule another appointment with the PCP

## 2017-10-12 ENCOUNTER — OFFICE VISIT (OUTPATIENT)
Dept: FAMILY MEDICINE CLINIC | Facility: CLINIC | Age: 47
End: 2017-10-12

## 2017-10-12 ENCOUNTER — HOSPITAL ENCOUNTER (OUTPATIENT)
Dept: GENERAL RADIOLOGY | Age: 47
Discharge: HOME | End: 2017-10-12
Attending: FAMILY MEDICINE
Payer: COMMERCIAL

## 2017-10-12 VITALS
RESPIRATION RATE: 18 BRPM | HEIGHT: 74 IN | BODY MASS INDEX: 37.86 KG/M2 | TEMPERATURE: 98 F | HEART RATE: 70 BPM | WEIGHT: 295 LBS | OXYGEN SATURATION: 95 %

## 2017-10-12 DIAGNOSIS — R07.9 CHEST PAIN, UNSPECIFIED TYPE: ICD-10-CM

## 2017-10-12 DIAGNOSIS — Z95.1 S/P CABG X 4: ICD-10-CM

## 2017-10-12 DIAGNOSIS — I25.10 CORONARY ARTERY DISEASE INVOLVING NATIVE HEART, ANGINA PRESENCE UNSPECIFIED, UNSPECIFIED VESSEL OR LESION TYPE: ICD-10-CM

## 2017-10-12 DIAGNOSIS — Z23 NEEDS FLU SHOT: ICD-10-CM

## 2017-10-12 DIAGNOSIS — R07.9 CHEST PAIN, UNSPECIFIED TYPE: Primary | ICD-10-CM

## 2017-10-12 PROBLEM — Z12.11 SCREENING FOR MALIGNANT NEOPLASM OF COLON: Status: RESOLVED | Noted: 2017-08-18 | Resolved: 2017-10-12

## 2017-10-12 PROCEDURE — 99214 OFFICE O/P EST MOD 30 MIN: CPT | Performed by: FAMILY MEDICINE

## 2017-10-12 PROCEDURE — 71020 XR CHEST PA + LAT CHEST (CPT=71020): CPT | Performed by: FAMILY MEDICINE

## 2017-10-12 PROCEDURE — 90471 IMMUNIZATION ADMIN: CPT | Performed by: FAMILY MEDICINE

## 2017-10-12 PROCEDURE — 90686 IIV4 VACC NO PRSV 0.5 ML IM: CPT | Performed by: FAMILY MEDICINE

## 2017-10-12 NOTE — PROGRESS NOTES
Chief Complaint:   Patient presents with:  Chest Pain: X 1-2 month chest pain, bump lower right side     HPI:   This is a 51year old male presenting with left chest wall and epigastric pain, worse with movement and exertion, better.        Chest Pain    Th • Hyperlipidemia    • Hyperlipidemia LDL goal < 70    • Morbid obesity with BMI of 40.0-44.9, adult ([facility] [address]    • Nontoxic multinodular goiter     Dx in 8/2013: pt was told that imaging showed thyroid cysts per PCP   • Peripheral vascular disease ([facility] [address]    • rhinorrhea, sinus pressure, sore throat and voice change. Eyes: Negative for photophobia, pain, discharge, redness, itching and visual disturbance. Respiratory: Positive for cough.  Negative for hemoptysis, sputum production, chest tightness and shortn Eyes: Conjunctivae and EOM are normal. Pupils are equal, round, and reactive to light. Right eye exhibits no discharge. Left eye exhibits no discharge. Neck: Normal range of motion. Neck supple. No JVD present. No tracheal deviation present.  No thyrome

## 2017-10-19 ENCOUNTER — OFFICE VISIT (OUTPATIENT)
Dept: FAMILY MEDICINE CLINIC | Facility: CLINIC | Age: 47
End: 2017-10-19

## 2017-10-19 VITALS
RESPIRATION RATE: 16 BRPM | SYSTOLIC BLOOD PRESSURE: 126 MMHG | HEART RATE: 80 BPM | OXYGEN SATURATION: 97 % | BODY MASS INDEX: 37.47 KG/M2 | WEIGHT: 292 LBS | DIASTOLIC BLOOD PRESSURE: 80 MMHG | TEMPERATURE: 98 F | HEIGHT: 74 IN

## 2017-10-19 DIAGNOSIS — E66.01 MORBID OBESITY WITH BMI OF 40.0-44.9, ADULT (HCC): ICD-10-CM

## 2017-10-19 DIAGNOSIS — Z02.89 ENCOUNTER FOR EXAMINATION REQUIRED BY DEPARTMENT OF TRANSPORTATION (DOT): Primary | ICD-10-CM

## 2017-10-19 DIAGNOSIS — L72.3 SEBACEOUS CYST: ICD-10-CM

## 2017-10-19 DIAGNOSIS — I10 ESSENTIAL HYPERTENSION: ICD-10-CM

## 2017-10-19 DIAGNOSIS — E78.5 HYPERLIPIDEMIA WITH TARGET LOW DENSITY LIPOPROTEIN (LDL) CHOLESTEROL LESS THAN 70 MG/DL: ICD-10-CM

## 2017-10-19 PROCEDURE — 11422 EXC H-F-NK-SP B9+MARG 1.1-2: CPT | Performed by: FAMILY MEDICINE

## 2017-10-19 PROCEDURE — 81003 URINALYSIS AUTO W/O SCOPE: CPT | Performed by: FAMILY MEDICINE

## 2017-10-19 PROCEDURE — 99214 OFFICE O/P EST MOD 30 MIN: CPT | Performed by: FAMILY MEDICINE

## 2017-10-20 NOTE — PROGRESS NOTES
Chief Complaint:   Patient presents with:  Employment Physical    HPI:   This is a 49year old male presenting for DOT physical he has a history of heart disease hypertension hyperlipidemia currently under control denies any new complaints at this time barrios Prescriptions:  METOPROLOL SUCCINATE ER 50 MG Oral Tablet 24 Hr TAKE 1 TABLET (50 MG TOTAL) BY MOUTH DAILY.  Disp: 90 tablet Rfl: 1   DICLOFENAC SODIUM 75 MG Oral Tab EC TAKE 1 TABLET BY MOUTH 2 TIMES DAILY AS NEEDED WITH MEALS Disp: 60 tablet Rfl: 0   DICL nervous/anxious.         EXAM:   /80 (BP Location: Left arm, Patient Position: Sitting, Cuff Size: adult)   Pulse 80   Temp 98 °F (36.7 °C) (Oral)   Resp 16   Ht 74\"   Wt 292 lb   SpO2 97%   BMI 37.49 kg/m²  Estimated body mass index is 37.49 kg/m² a Procedure Note for cyst removal  Location: right groin  Discussed with patient the risks and benefits and risk of bleeding or worsening infection and the patient gave verbal consent to continue with procedure.   Preparation with alcohol and iodine  Anesth

## 2017-10-26 ENCOUNTER — OFFICE VISIT (OUTPATIENT)
Dept: FAMILY MEDICINE CLINIC | Facility: CLINIC | Age: 47
End: 2017-10-26

## 2017-10-26 VITALS
TEMPERATURE: 99 F | HEIGHT: 74.5 IN | WEIGHT: 298 LBS | DIASTOLIC BLOOD PRESSURE: 84 MMHG | OXYGEN SATURATION: 95 % | BODY MASS INDEX: 37.84 KG/M2 | HEART RATE: 60 BPM | SYSTOLIC BLOOD PRESSURE: 122 MMHG | RESPIRATION RATE: 15 BRPM

## 2017-10-26 DIAGNOSIS — L72.3 SEBACEOUS CYST: Primary | ICD-10-CM

## 2017-10-26 PROCEDURE — 99212 OFFICE O/P EST SF 10 MIN: CPT | Performed by: PHYSICIAN ASSISTANT

## 2017-10-26 NOTE — PROGRESS NOTES
CHIEF COMPLAINT:   Patient presents with:  Suture Removal: Suture removal on groin area       HPI:     Enid Issa. is a 49year old male who presents to follow up on pubic cyst removal. Cyst was removed 10/19/17. 2 sutres were placed.  Area is heali enlargement of the lymph nodes. MUSC/SKEL: no joint swelling, no joint stiffness. NEURO: no abnormal sensation, no tingling of the skin or numbness.     EXAM:   /84   Pulse 60   Temp 98.7 °F (37.1 °C) (Oral)   Resp 15   Ht 74.5\"   Wt 298 lb   SpO2

## 2017-12-13 ENCOUNTER — OFFICE VISIT (OUTPATIENT)
Dept: FAMILY MEDICINE CLINIC | Facility: CLINIC | Age: 47
End: 2017-12-13

## 2017-12-13 ENCOUNTER — HOSPITAL ENCOUNTER (OUTPATIENT)
Dept: GENERAL RADIOLOGY | Age: 47
Discharge: HOME | End: 2017-12-13
Attending: FAMILY MEDICINE
Payer: COMMERCIAL

## 2017-12-13 VITALS
HEIGHT: 74.5 IN | OXYGEN SATURATION: 98 % | BODY MASS INDEX: 38.48 KG/M2 | SYSTOLIC BLOOD PRESSURE: 120 MMHG | RESPIRATION RATE: 16 BRPM | DIASTOLIC BLOOD PRESSURE: 80 MMHG | HEART RATE: 88 BPM | TEMPERATURE: 98 F | WEIGHT: 303 LBS

## 2017-12-13 DIAGNOSIS — I10 ESSENTIAL HYPERTENSION: ICD-10-CM

## 2017-12-13 DIAGNOSIS — M25.561 CHRONIC PAIN OF RIGHT KNEE: Primary | ICD-10-CM

## 2017-12-13 DIAGNOSIS — G89.29 CHRONIC PAIN OF RIGHT KNEE: ICD-10-CM

## 2017-12-13 DIAGNOSIS — M25.561 CHRONIC PAIN OF RIGHT KNEE: ICD-10-CM

## 2017-12-13 DIAGNOSIS — E66.01 MORBID OBESITY WITH BMI OF 40.0-44.9, ADULT (HCC): ICD-10-CM

## 2017-12-13 DIAGNOSIS — R73.03 BORDERLINE DIABETES: ICD-10-CM

## 2017-12-13 DIAGNOSIS — G89.29 CHRONIC PAIN OF RIGHT KNEE: Primary | ICD-10-CM

## 2017-12-13 PROCEDURE — 99214 OFFICE O/P EST MOD 30 MIN: CPT | Performed by: FAMILY MEDICINE

## 2017-12-13 PROCEDURE — 73560 X-RAY EXAM OF KNEE 1 OR 2: CPT | Performed by: FAMILY MEDICINE

## 2017-12-13 NOTE — PROGRESS NOTES
Chief Complaint:   Patient presents with:  Knee Pain: X 1-2 crystal Right knee pain     HPI:   This is a 49year old male patient reports right knee pain denies any recent injury or trauma has a history of high BMI blood pressure is been under control.   Ninfa Packs/day: 1.00      Years: 27.00        Types: Cigarettes     Quit date: 8/15/2013  Smokeless tobacco: Never Used                      Alcohol use:  No              Family History:  Family History   Problem Relatio light-headedness and headaches. Hematological: Negative for adenopathy. Does not bruise/bleed easily. Psychiatric/Behavioral: Negative for suicidal ideas, behavioral problems, sleep disturbance and agitation. The patient is not nervous/anxious. nerve deficit or motor deficit. Gait normal.   Skin: Skin is warm and dry. No lesion and no rash noted. He is not diaphoretic. Psychiatric: He has a normal mood and affect.  His behavior is normal. Judgment and thought content normal.        ASSESSMENT AN

## 2017-12-19 ENCOUNTER — APPOINTMENT (OUTPATIENT)
Dept: CT IMAGING | Facility: HOSPITAL | Age: 47
End: 2017-12-19
Attending: EMERGENCY MEDICINE
Payer: COMMERCIAL

## 2017-12-19 ENCOUNTER — HOSPITAL ENCOUNTER (EMERGENCY)
Facility: HOSPITAL | Age: 47
Discharge: HOME | End: 2017-12-19
Attending: EMERGENCY MEDICINE
Payer: COMMERCIAL

## 2017-12-19 VITALS
WEIGHT: 295 LBS | RESPIRATION RATE: 16 BRPM | OXYGEN SATURATION: 98 % | HEART RATE: 65 BPM | BODY MASS INDEX: 37.86 KG/M2 | DIASTOLIC BLOOD PRESSURE: 97 MMHG | SYSTOLIC BLOOD PRESSURE: 131 MMHG | TEMPERATURE: 98 F | HEIGHT: 74 IN

## 2017-12-19 DIAGNOSIS — K57.92 ACUTE DIVERTICULITIS: Primary | ICD-10-CM

## 2017-12-19 PROCEDURE — 99284 EMERGENCY DEPT VISIT MOD MDM: CPT

## 2017-12-19 PROCEDURE — 96365 THER/PROPH/DIAG IV INF INIT: CPT

## 2017-12-19 PROCEDURE — 74177 CT ABD & PELVIS W/CONTRAST: CPT | Performed by: EMERGENCY MEDICINE

## 2017-12-19 PROCEDURE — 99285 EMERGENCY DEPT VISIT HI MDM: CPT

## 2017-12-19 PROCEDURE — 83690 ASSAY OF LIPASE: CPT | Performed by: EMERGENCY MEDICINE

## 2017-12-19 PROCEDURE — 85025 COMPLETE CBC W/AUTO DIFF WBC: CPT | Performed by: EMERGENCY MEDICINE

## 2017-12-19 PROCEDURE — 81003 URINALYSIS AUTO W/O SCOPE: CPT | Performed by: EMERGENCY MEDICINE

## 2017-12-19 PROCEDURE — 96361 HYDRATE IV INFUSION ADD-ON: CPT

## 2017-12-19 PROCEDURE — 80053 COMPREHEN METABOLIC PANEL: CPT | Performed by: EMERGENCY MEDICINE

## 2017-12-19 PROCEDURE — 96366 THER/PROPH/DIAG IV INF ADDON: CPT

## 2017-12-19 RX ORDER — HYDROCODONE BITARTRATE AND ACETAMINOPHEN 5; 325 MG/1; MG/1
1-2 TABLET ORAL EVERY 4 HOURS PRN
Qty: 10 TABLET | Refills: 0 | Status: SHIPPED | OUTPATIENT
Start: 2017-12-19 | End: 2017-12-26

## 2017-12-19 RX ORDER — METRONIDAZOLE 500 MG/1
500 TABLET ORAL ONCE
Status: COMPLETED | OUTPATIENT
Start: 2017-12-19 | End: 2017-12-19

## 2017-12-19 RX ORDER — METRONIDAZOLE 500 MG/1
500 TABLET ORAL 3 TIMES DAILY
Qty: 30 TABLET | Refills: 0 | Status: SHIPPED | OUTPATIENT
Start: 2017-12-19 | End: 2017-12-29

## 2017-12-19 RX ORDER — LEVOFLOXACIN 500 MG/1
500 TABLET, FILM COATED ORAL DAILY
Qty: 10 TABLET | Refills: 0 | Status: SHIPPED | OUTPATIENT
Start: 2017-12-19 | End: 2017-12-29

## 2017-12-19 RX ORDER — LEVOFLOXACIN 5 MG/ML
750 INJECTION, SOLUTION INTRAVENOUS ONCE
Status: COMPLETED | OUTPATIENT
Start: 2017-12-19 | End: 2017-12-19

## 2017-12-19 NOTE — PROGRESS NOTES
Pharmacy Note: dose adjustment of Phyllis Arango. is a 49year old male who has been prescribed Levaquin 500 mg IV x 1 dose for the ER.   CrCl is >100 ml/min so the dose has been adjusted  to Levaquin 750 mg IV x 1 dose for the ER per hospit

## 2017-12-19 NOTE — ED PROVIDER NOTES
Patient Seen in: [facility] Emergency Department    History   Patient presents with:  Abdomen/Flank Pain (GI/)    Stated Complaint: abdominal pain    HPI    70-year-old white male who presents emergent trigger point of abdominal pain.   Patient rep in HPI. Constitutional and vital signs reviewed. All other systems reviewed and negative except as noted above.     Physical Exam   ED Triage Vitals [12/19/17 0845]  BP: (!) 156/103  Pulse: 75  Resp: 16  Temp: 98 °F (36.7 °C)  Temp src: Oral  SpO2: 97 -----------         ------                     CBC W/ DIFFERENTIAL[[id number]]          Abnormal            Final result                 Please view results for these tests on the individual orders.    RAINBOW DRAW BLUE   RAINBOW Patient was advised to take medications as prescribed. Return if things worsen he develops fever vomiting or new complaints. Patient states understanding instructions and will be discharged home.             Disposition and Plan     Clinical Impression:

## 2017-12-19 NOTE — ED NOTES
Report rcvd by DANY Madrigal, patient awaiting for abx to be completed. Patient in stable condition.

## 2017-12-22 ENCOUNTER — OFFICE VISIT (OUTPATIENT)
Dept: FAMILY MEDICINE CLINIC | Facility: CLINIC | Age: 47
End: 2017-12-22

## 2017-12-22 VITALS
RESPIRATION RATE: 16 BRPM | HEIGHT: 74.5 IN | WEIGHT: 301 LBS | SYSTOLIC BLOOD PRESSURE: 128 MMHG | DIASTOLIC BLOOD PRESSURE: 80 MMHG | BODY MASS INDEX: 38.22 KG/M2 | TEMPERATURE: 98 F | HEART RATE: 68 BPM

## 2017-12-22 DIAGNOSIS — R35.89 POLYURIA: ICD-10-CM

## 2017-12-22 DIAGNOSIS — K57.92 ACUTE DIVERTICULITIS: Primary | ICD-10-CM

## 2017-12-22 DIAGNOSIS — R73.03 BORDERLINE DIABETES: ICD-10-CM

## 2017-12-22 DIAGNOSIS — K21.9 GASTROESOPHAGEAL REFLUX DISEASE WITHOUT ESOPHAGITIS: ICD-10-CM

## 2017-12-22 PROCEDURE — 99214 OFFICE O/P EST MOD 30 MIN: CPT | Performed by: FAMILY MEDICINE

## 2017-12-23 NOTE — PROGRESS NOTES
Abigail Perez. is a 49year old male who presents for Patient presents with:  ER F/U    HPI:   Patient's presenting for follow up from Hospital     Patient was in Hospital/ER: date(s) 12/19/17    Patient reports overall improvement.      Medication: ci metRONIDAZOLE 500 MG Oral Tab Take 1 tablet (500 mg total) by mouth 3 (three) times daily. Disp: 30 tablet Rfl: 0   HYDROcodone-acetaminophen 5-325 MG Oral Tab Take 1-2 tablets by mouth every 4 (four) hours as needed for Pain.  Disp: 10 tablet Rfl: 0   ME A comprehensive 10 point review of systems was completed. Pertinent positives and negatives noted in the the HPI.     /80   Pulse 68   Temp 98.2 °F (36.8 °C) (Oral)   Resp 16   Ht 74.5\"   Wt (!) 301 lb   BMI 38.13 kg/m²   Body mass and vitals reviewed. ASSESSMENT AND PLAN:   Donald Marie. is a 49year old male who presents for hospital/ER follow up, Hospital notes, consultant notes, and labs and imaging was reviewed with patient:   1.  Acute diverticulitis  -completed o

## 2018-01-02 ENCOUNTER — OFFICE VISIT (OUTPATIENT)
Dept: SURGERY | Facility: CLINIC | Age: 48
End: 2018-01-02

## 2018-01-02 VITALS
HEART RATE: 62 BPM | SYSTOLIC BLOOD PRESSURE: 145 MMHG | WEIGHT: 290 LBS | BODY MASS INDEX: 37.22 KG/M2 | DIASTOLIC BLOOD PRESSURE: 86 MMHG | HEIGHT: 74 IN | TEMPERATURE: 98 F

## 2018-01-02 DIAGNOSIS — I10 ESSENTIAL HYPERTENSION: ICD-10-CM

## 2018-01-02 DIAGNOSIS — R10.10 UPPER ABDOMINAL PAIN: ICD-10-CM

## 2018-01-02 DIAGNOSIS — E66.01 MORBID OBESITY WITH BMI OF 40.0-44.9, ADULT (HCC): ICD-10-CM

## 2018-01-02 DIAGNOSIS — Z95.1 S/P CABG X 4: ICD-10-CM

## 2018-01-02 DIAGNOSIS — K57.32 DIVERTICULITIS LARGE INTESTINE W/O PERFORATION OR ABSCESS W/O BLEEDING: Primary | ICD-10-CM

## 2018-01-02 PROCEDURE — 99213 OFFICE O/P EST LOW 20 MIN: CPT | Performed by: SURGERY

## 2018-01-02 RX ORDER — POLYETHYLENE GLYCOL 3350, SODIUM CHLORIDE, SODIUM BICARBONATE, POTASSIUM CHLORIDE 420; 11.2; 5.72; 1.48 G/4L; G/4L; G/4L; G/4L
POWDER, FOR SOLUTION ORAL
Qty: 1 BOTTLE | Refills: 0 | Status: SHIPPED | OUTPATIENT
Start: 2018-01-02 | End: 2018-05-08 | Stop reason: ALTCHOICE

## 2018-01-02 RX ORDER — OMEPRAZOLE 20 MG/1
20 CAPSULE, DELAYED RELEASE ORAL
Qty: 30 CAPSULE | Refills: 3 | Status: SHIPPED | OUTPATIENT
Start: 2018-01-02 | End: 2018-08-31

## 2018-01-02 NOTE — PROGRESS NOTES
Follow Up Visit Note       Active Problems      1. Diverticulitis large intestine w/o perforation or abscess w/o bleeding    2. Upper abdominal pain    3. Essential hypertension    4. Morbid obesity with BMI of 40.0-44.9, adult ([address]    5.  S/P CABG x 4 3-vessel                disease    The family history and social history have been reviewed by me today.     Family History   Problem Relation Age of Onset   • Diabetes Neg      Social History    Marital status: Single              Spouse name: Cardiovascular: Negative for chest pain, palpitations and leg swelling. Gastrointestinal: Negative for abdominal distention, abdominal pain, anal bleeding, blood in stool, constipation, diarrhea, nausea and vomiting.    Genitourinary: Negative for diffi provided. · Start PPI. · All questions answered. I spent 30 minutes face to face with the patient. More than 50% of that time was spent counseling the patient and/or on coordination of care.  The diagnosis, prognosis, and general treatment was explaine

## 2018-01-05 ENCOUNTER — LAB ENCOUNTER (OUTPATIENT)
Dept: LAB | Age: 48
End: 2018-01-05
Attending: FAMILY MEDICINE
Payer: COMMERCIAL

## 2018-01-05 DIAGNOSIS — K57.92 ACUTE DIVERTICULITIS: ICD-10-CM

## 2018-01-05 DIAGNOSIS — R35.89 POLYURIA: ICD-10-CM

## 2018-01-05 DIAGNOSIS — R89.9 ABNORMAL LABORATORY TEST: ICD-10-CM

## 2018-01-05 DIAGNOSIS — R74.01 ELEVATED ALT MEASUREMENT: ICD-10-CM

## 2018-01-05 LAB
ALBUMIN SERPL-MCNC: 3.9 G/DL (ref 3.5–4.8)
ALP LIVER SERPL-CCNC: 75 U/L (ref 45–117)
ALT SERPL-CCNC: 62 U/L (ref 17–63)
AST SERPL-CCNC: 26 U/L (ref 15–41)
BASOPHILS # BLD AUTO: 0.03 X10(3) UL (ref 0–0.1)
BASOPHILS NFR BLD AUTO: 0.6 %
BILIRUB SERPL-MCNC: 0.4 MG/DL (ref 0.1–2)
BUN BLD-MCNC: 19 MG/DL (ref 8–20)
CALCIUM BLD-MCNC: 8.8 MG/DL (ref 8.3–10.3)
CHLORIDE: 109 MMOL/L (ref 101–111)
CO2: 26 MMOL/L (ref 22–32)
CREAT BLD-MCNC: 0.86 MG/DL (ref 0.7–1.3)
EOSINOPHIL # BLD AUTO: 0.14 X10(3) UL (ref 0–0.3)
EOSINOPHIL NFR BLD AUTO: 2.8 %
ERYTHROCYTE [DISTWIDTH] IN BLOOD BY AUTOMATED COUNT: 12 % (ref 11.5–16)
GLUCOSE BLD-MCNC: 105 MG/DL (ref 70–99)
HCT VFR BLD AUTO: 45.3 % (ref 37–53)
HGB BLD-MCNC: 14.6 G/DL (ref 13–17)
IMMATURE GRANULOCYTE COUNT: 0.04 X10(3) UL (ref 0–1)
IMMATURE GRANULOCYTE RATIO %: 0.8 %
LYMPHOCYTES # BLD AUTO: 1.26 X10(3) UL (ref 0.9–4)
LYMPHOCYTES NFR BLD AUTO: 24.9 %
M PROTEIN MFR SERPL ELPH: 7.1 G/DL (ref 6.1–8.3)
MCH RBC QN AUTO: 30 PG (ref 27–33.2)
MCHC RBC AUTO-ENTMCNC: 32.2 G/DL (ref 31–37)
MCV RBC AUTO: 93 FL (ref 80–99)
MONOCYTES # BLD AUTO: 0.42 X10(3) UL (ref 0.1–0.6)
MONOCYTES NFR BLD AUTO: 8.3 %
NEUTROPHIL ABS PRELIM: 3.18 X10 (3) UL (ref 1.3–6.7)
NEUTROPHILS # BLD AUTO: 3.18 X10(3) UL (ref 1.3–6.7)
NEUTROPHILS NFR BLD AUTO: 62.6 %
PLATELET # BLD AUTO: 249 10(3)UL (ref 150–450)
POTASSIUM SERPL-SCNC: 4.4 MMOL/L (ref 3.6–5.1)
RBC # BLD AUTO: 4.87 X10(6)UL (ref 4.3–5.7)
RED CELL DISTRIBUTION WIDTH-SD: 41.7 FL (ref 35.1–46.3)
SED RATE-ML: 5 MM/HR (ref 0–12)
SODIUM SERPL-SCNC: 141 MMOL/L (ref 136–144)
WBC # BLD AUTO: 5.1 X10(3) UL (ref 4–13)

## 2018-01-05 PROCEDURE — 36415 COLL VENOUS BLD VENIPUNCTURE: CPT | Performed by: FAMILY MEDICINE

## 2018-01-05 PROCEDURE — 87086 URINE CULTURE/COLONY COUNT: CPT | Performed by: FAMILY MEDICINE

## 2018-01-05 PROCEDURE — 85025 COMPLETE CBC W/AUTO DIFF WBC: CPT | Performed by: FAMILY MEDICINE

## 2018-01-05 PROCEDURE — 80053 COMPREHEN METABOLIC PANEL: CPT | Performed by: FAMILY MEDICINE

## 2018-01-05 PROCEDURE — 85652 RBC SED RATE AUTOMATED: CPT | Performed by: FAMILY MEDICINE

## 2018-01-25 ENCOUNTER — TELEPHONE (OUTPATIENT)
Dept: SURGERY | Facility: CLINIC | Age: 48
End: 2018-01-25

## 2018-01-26 ENCOUNTER — LABORATORY ENCOUNTER (OUTPATIENT)
Dept: LAB | Age: 48
End: 2018-01-26
Attending: SURGERY
Payer: COMMERCIAL

## 2018-01-26 DIAGNOSIS — K57.92 DIVERTICULITIS: ICD-10-CM

## 2018-01-26 DIAGNOSIS — R10.9 ABDOMINAL PAIN: Primary | ICD-10-CM

## 2018-01-26 DIAGNOSIS — K21.9 GERD (GASTROESOPHAGEAL REFLUX DISEASE): ICD-10-CM

## 2018-01-26 PROCEDURE — 88305 TISSUE EXAM BY PATHOLOGIST: CPT

## 2018-02-28 RX ORDER — DICLOFENAC SODIUM 75 MG/1
TABLET, DELAYED RELEASE ORAL
Qty: 60 TABLET | Refills: 1 | Status: SHIPPED | OUTPATIENT
Start: 2018-02-28 | End: 2018-05-08

## 2018-03-04 DIAGNOSIS — I10 ESSENTIAL HYPERTENSION: ICD-10-CM

## 2018-03-04 RX ORDER — POLYETHYLENE GLYCOL 3350, SODIUM CHLORIDE, SODIUM BICARBONATE, POTASSIUM CHLORIDE 420; 11.2; 5.72; 1.48 G/4L; G/4L; G/4L; G/4L
POWDER, FOR SOLUTION ORAL
Qty: 1 BOTTLE | Refills: 0
Start: 2018-03-04

## 2018-03-05 RX ORDER — METOPROLOL SUCCINATE 50 MG/1
50 TABLET, EXTENDED RELEASE ORAL DAILY
Qty: 90 TABLET | Refills: 1 | Status: SHIPPED | OUTPATIENT
Start: 2018-03-05 | End: 2018-08-31

## 2018-03-05 NOTE — TELEPHONE ENCOUNTER
From: Timoteo Mcmillan.   Sent: 3/4/2018 4:26 PM CST  Subject: Medication Renewal Request    Timoteo Mcmillan. would like a refill of the following medications:     METOPROLOL SUCCINATE ER 50 MG Oral Tablet 24 Hr Caterina Ferreira MD]    Preferred pharmacy

## 2018-03-28 DIAGNOSIS — I10 ESSENTIAL HYPERTENSION: ICD-10-CM

## 2018-03-28 RX ORDER — METOPROLOL SUCCINATE 50 MG/1
50 TABLET, EXTENDED RELEASE ORAL DAILY
Qty: 90 TABLET | Refills: 1 | Status: SHIPPED | OUTPATIENT
Start: 2018-03-28 | End: 2018-04-12

## 2018-04-09 RX ORDER — ATORVASTATIN CALCIUM 20 MG/1
TABLET, FILM COATED ORAL
Qty: 90 TABLET | Refills: 0 | Status: SHIPPED | OUTPATIENT
Start: 2018-04-09 | End: 2018-04-12

## 2018-04-12 DIAGNOSIS — I10 ESSENTIAL HYPERTENSION: ICD-10-CM

## 2018-04-12 RX ORDER — ATORVASTATIN CALCIUM 20 MG/1
TABLET, FILM COATED ORAL
Qty: 90 TABLET | Refills: 0 | Status: SHIPPED | OUTPATIENT
Start: 2018-04-12 | End: 2018-10-05

## 2018-04-12 RX ORDER — METOPROLOL SUCCINATE 50 MG/1
50 TABLET, EXTENDED RELEASE ORAL DAILY
Qty: 90 TABLET | Refills: 0 | Status: SHIPPED | OUTPATIENT
Start: 2018-04-12 | End: 2018-05-08

## 2018-04-12 NOTE — TELEPHONE ENCOUNTER
LOV: 12/22/17  Patient is requesting these medications to a new pharmacy. Please approve or deny pending Rx. Thank you!

## 2018-04-12 NOTE — TELEPHONE ENCOUNTER
METOPROLOL SUCCINATE ER 50 MG Oral Tablet 24 Hr Sig - Route: TAKE 1 TABLET (50 MG TOTAL) BY MOUTH DAILY.  - Oral    Patient is needing this medication refilled for his cholesterol sent to [address] [place] [address] in [facility])

## 2018-05-08 ENCOUNTER — OFFICE VISIT (OUTPATIENT)
Dept: FAMILY MEDICINE CLINIC | Facility: CLINIC | Age: 48
End: 2018-05-08

## 2018-05-08 VITALS
DIASTOLIC BLOOD PRESSURE: 80 MMHG | RESPIRATION RATE: 16 BRPM | WEIGHT: 299 LBS | SYSTOLIC BLOOD PRESSURE: 130 MMHG | TEMPERATURE: 99 F | OXYGEN SATURATION: 98 % | BODY MASS INDEX: 38.78 KG/M2 | HEART RATE: 70 BPM | HEIGHT: 73.5 IN

## 2018-05-08 DIAGNOSIS — H10.32 ACUTE CONJUNCTIVITIS OF LEFT EYE, UNSPECIFIED ACUTE CONJUNCTIVITIS TYPE: Primary | ICD-10-CM

## 2018-05-08 PROCEDURE — 99213 OFFICE O/P EST LOW 20 MIN: CPT | Performed by: NURSE PRACTITIONER

## 2018-05-08 RX ORDER — POLYMYXIN B SULFATE AND TRIMETHOPRIM 1; 10000 MG/ML; [USP'U]/ML
1 SOLUTION OPHTHALMIC EVERY 4 HOURS
Qty: 1 BOTTLE | Refills: 0 | Status: SHIPPED | OUTPATIENT
Start: 2018-05-08 | End: 2018-05-13

## 2018-05-08 RX ORDER — DICLOFENAC SODIUM 75 MG/1
TABLET, DELAYED RELEASE ORAL
Qty: 60 TABLET | Refills: 1 | Status: SHIPPED | OUTPATIENT
Start: 2018-05-08 | End: 2018-08-13

## 2018-05-08 NOTE — TELEPHONE ENCOUNTER
Patient needs a refill for the medication for gout pain, he does not know the name of it. He thinks he may have one refill for this medication but not sure.  Please send to [facility] in [place]

## 2018-05-08 NOTE — TELEPHONE ENCOUNTER
LF: 2/28/18 LOV: 12/22/17  Please approve or deny pending Rx. Thank you! Patient was in the office. Patient clarified message below. Patient is requesting a refill on Diclofenac for chronic foot pain, not gout.

## 2018-05-08 NOTE — PROGRESS NOTES
CHIEF COMPLAINT:   Patient presents with:  Eye Problem: Left eye red, since last PM.      HPI:   Nacho Castro. is a 49year old male who presents with chief complaint of L eye being red. Symptoms began last night.  Symptoms have been worsening since On 8/16/13: CABG x 4 with LIMA to LAD and SVG to diagonal, OM and PDA      Past Surgical History:  No date: APPENDECTOMY  No date: CABG      Comment: On 8/16/13: CABG x 4 with LIMA to LAD and SVG                to diagonal, OM and PDA  No date: CARDIAC CA Polymyxin B-Trimethoprim (POLYTRIM) 77319-7.1 UNIT/ML-% Ophthalmic Solution 1 Bottle 0      Sig: Place 1 drop into the left eye every 4 (four) hours. For 5-7 days. Risks, benefits, complications and side effects of meds discussed.     Patient I Follow-up care  Follow up with your healthcare provider, or as advised.   When to seek medical advice  Call your healthcare provider right away if any of these occur:  · Worsening vision  · Increasing pain in the eye  · Increasing swelling or redness of the

## 2018-05-08 NOTE — PATIENT INSTRUCTIONS
-warm compress as needed to L eye  -use eye drops every 4 hours for the next 5-7 days      Bacterial Conjunctivitis    You have an infection in the membranes covering the white part of the eye. This part of the eye is called the conjunctiva.  The infection spreading around the eye  Date Last Reviewed: 7/1/2017  © 9302-9744 [employer] [address]. [address] [facility] [place] [address] [place]. All rights reserved. This information is not intended as a substitute for professional medical care.  Always follow you

## 2018-06-22 ENCOUNTER — OFFICE VISIT (OUTPATIENT)
Dept: FAMILY MEDICINE CLINIC | Facility: CLINIC | Age: 48
End: 2018-06-22

## 2018-06-22 VITALS
TEMPERATURE: 99 F | DIASTOLIC BLOOD PRESSURE: 80 MMHG | OXYGEN SATURATION: 97 % | HEART RATE: 62 BPM | RESPIRATION RATE: 16 BRPM | SYSTOLIC BLOOD PRESSURE: 130 MMHG | HEIGHT: 73.5 IN | WEIGHT: 285 LBS | BODY MASS INDEX: 36.97 KG/M2

## 2018-06-22 DIAGNOSIS — S99.922A INJURY OF TOE ON LEFT FOOT, INITIAL ENCOUNTER: Primary | ICD-10-CM

## 2018-06-22 PROCEDURE — 99213 OFFICE O/P EST LOW 20 MIN: CPT | Performed by: NURSE PRACTITIONER

## 2018-06-22 NOTE — PROGRESS NOTES
Timoteo Mcmillan. is a 49year old male.   HPI:   Patient's presenting with an injury to: left 5th toes  Cause - Pt was walking in his living room and stubbed his toe on the leg of a couch  Onset - 2 hours prior to arrival  Location of pain - tip of left Types: Cigarettes     Quit date: 8/15/2013  Smokeless tobacco: Never Used                      Alcohol use:  No                 REVIEW OF SYSTEMS:   GENERAL HEALTH: feels well otherwise  SKIN: denies any unusual skin lesions or rashes, did not have immed You may not always know when you have a fractured toe or finger. Apply ice to the injury right away. Then, seek medical care if:  · Your finger or toe is swollen or very painful. · You cannot move your finger or toe normally.   · Your injured toe or finger Pain is your body’s way of telling you to rest an injured area.  Whether you have hurt an elbow, hand, foot, or knee, limiting its use will prevent further injury and help you heal.  ? Ice  Applying ice right after an injury helps prevent swelling and reduc

## 2018-06-22 NOTE — PATIENT INSTRUCTIONS
rst ice and elevate your foot today. Follow up with podiatry if pain worsens over the next few days. Finger or Toe Fractures (Broken Finger or Toe)  A hard blow can break a bone in your toe or finger. Broken bones are also known as fractures.  Even linda R. I.C.E. stands for Rest, Ice, Compression, and Elevation. Doing these things helps limit pain and swelling after an injury. R.I.C.E. also helps injuries heal faster. Use R.I.C.E. for sprains, strains, and severe bruises or bumps.  Follow the tips on this h

## 2018-08-14 RX ORDER — DICLOFENAC SODIUM 75 MG/1
TABLET, DELAYED RELEASE ORAL
Qty: 60 TABLET | Refills: 1 | Status: SHIPPED | OUTPATIENT
Start: 2018-08-14 | End: 2018-08-31 | Stop reason: ALTCHOICE

## 2018-08-14 NOTE — TELEPHONE ENCOUNTER
From: Daylin Flower.   Sent: 8/13/2018 8:39 PM CDT  Subject: Medication Renewal Request    Daylin Flower. would like a refill of the following medications:     Diclofenac Sodium 75 MG Oral Tab EC Elena Aparicio MD]    Preferred pharmacy: Other - [facility]

## 2018-08-22 ENCOUNTER — OFFICE VISIT (OUTPATIENT)
Dept: FAMILY MEDICINE CLINIC | Facility: CLINIC | Age: 48
End: 2018-08-22
Payer: COMMERCIAL

## 2018-08-22 ENCOUNTER — HOSPITAL ENCOUNTER (OUTPATIENT)
Dept: ULTRASOUND IMAGING | Age: 48
Discharge: HOME | End: 2018-08-22
Attending: FAMILY MEDICINE
Payer: COMMERCIAL

## 2018-08-22 ENCOUNTER — TELEPHONE (OUTPATIENT)
Dept: FAMILY MEDICINE CLINIC | Facility: CLINIC | Age: 48
End: 2018-08-22

## 2018-08-22 ENCOUNTER — LAB ENCOUNTER (OUTPATIENT)
Dept: LAB | Age: 48
End: 2018-08-22
Attending: FAMILY MEDICINE
Payer: COMMERCIAL

## 2018-08-22 VITALS
WEIGHT: 286 LBS | DIASTOLIC BLOOD PRESSURE: 80 MMHG | HEIGHT: 73.5 IN | SYSTOLIC BLOOD PRESSURE: 138 MMHG | BODY MASS INDEX: 37.1 KG/M2 | OXYGEN SATURATION: 98 % | HEART RATE: 65 BPM | TEMPERATURE: 98 F | RESPIRATION RATE: 16 BRPM

## 2018-08-22 DIAGNOSIS — M79.89 LEFT LEG SWELLING: ICD-10-CM

## 2018-08-22 DIAGNOSIS — L03.116 CELLULITIS OF LEFT LOWER EXTREMITY: ICD-10-CM

## 2018-08-22 DIAGNOSIS — M79.89 LEFT LEG SWELLING: Primary | ICD-10-CM

## 2018-08-22 LAB
BASOPHILS # BLD AUTO: 0.03 X10(3) UL (ref 0–0.1)
BASOPHILS NFR BLD AUTO: 0.4 %
EOSINOPHIL # BLD AUTO: 0.15 X10(3) UL (ref 0–0.3)
EOSINOPHIL NFR BLD AUTO: 1.9 %
ERYTHROCYTE [DISTWIDTH] IN BLOOD BY AUTOMATED COUNT: 12.5 % (ref 11.5–16)
HCT VFR BLD AUTO: 40.9 % (ref 37–53)
HGB BLD-MCNC: 13.3 G/DL (ref 13–17)
IMMATURE GRANULOCYTE COUNT: 0.1 X10(3) UL (ref 0–1)
IMMATURE GRANULOCYTE RATIO %: 1.3 %
LYMPHOCYTES # BLD AUTO: 1.52 X10(3) UL (ref 0.9–4)
LYMPHOCYTES NFR BLD AUTO: 19.7 %
MCH RBC QN AUTO: 31.1 PG (ref 27–33.2)
MCHC RBC AUTO-ENTMCNC: 32.5 G/DL (ref 31–37)
MCV RBC AUTO: 95.6 FL (ref 80–99)
MONOCYTES # BLD AUTO: 0.71 X10(3) UL (ref 0.1–1)
MONOCYTES NFR BLD AUTO: 9.2 %
NEUTROPHIL ABS PRELIM: 5.21 X10 (3) UL (ref 1.3–6.7)
NEUTROPHILS # BLD AUTO: 5.21 X10(3) UL (ref 1.3–6.7)
NEUTROPHILS NFR BLD AUTO: 67.5 %
PLATELET # BLD AUTO: 209 10(3)UL (ref 150–450)
RBC # BLD AUTO: 4.28 X10(6)UL (ref 4.3–5.7)
RED CELL DISTRIBUTION WIDTH-SD: 43.2 FL (ref 35.1–46.3)
SED RATE-ML: 10 MM/HR (ref 0–12)
WBC # BLD AUTO: 7.7 X10(3) UL (ref 4–13)

## 2018-08-22 PROCEDURE — 93971 EXTREMITY STUDY: CPT | Performed by: FAMILY MEDICINE

## 2018-08-22 PROCEDURE — 85025 COMPLETE CBC W/AUTO DIFF WBC: CPT | Performed by: FAMILY MEDICINE

## 2018-08-22 PROCEDURE — 36415 COLL VENOUS BLD VENIPUNCTURE: CPT | Performed by: FAMILY MEDICINE

## 2018-08-22 PROCEDURE — 99214 OFFICE O/P EST MOD 30 MIN: CPT | Performed by: FAMILY MEDICINE

## 2018-08-22 PROCEDURE — 85652 RBC SED RATE AUTOMATED: CPT | Performed by: FAMILY MEDICINE

## 2018-08-22 RX ORDER — CEFPROZIL 500 MG/1
500 TABLET, FILM COATED ORAL 2 TIMES DAILY
Qty: 14 TABLET | Refills: 0 | Status: SHIPPED | OUTPATIENT
Start: 2018-08-22 | End: 2018-08-29

## 2018-08-22 RX ORDER — PREDNISONE 20 MG/1
40 TABLET ORAL DAILY
Qty: 10 TABLET | Refills: 0 | Status: SHIPPED | OUTPATIENT
Start: 2018-08-22 | End: 2018-08-31

## 2018-08-22 NOTE — PROGRESS NOTES
Patient presents with:  Leg Pain: Left lower leg painful, red and swollen - warm to touch, stings/burns when touched         HPI:         This is a swelling problem. The current episode started in the past  5 days.  The problem has been  worsening since o lung nodules less than 1 cm   • S/P CABG x 4     On 8/16/13: CABG x 4 with LIMA to LAD and SVG to diagonal, OM and PDA      Past Surgical History:  No date: APPENDECTOMY  No date: CABG      Comment: On 8/16/13: CABG x 4 with LIMA to LAD and SVG masses, HSM or tenderness  JOINTS: normal ROM of bilateral lower legs      ASSESSMENT AND PLAN:   Cody Pyle was seen today for leg pain. Diagnoses and all orders for this visit:    Left leg swelling  -     CBC WITH DIFFERENTIAL WITH PLATELET;  Future  -

## 2018-08-22 NOTE — TELEPHONE ENCOUNTER
Per Dr. Ana Good: tell Mr. Angely Lopez no DVT, I will send antibiotic and Prednisone. no Diflofenac or NSAIDs . that makes swelling bigger. ok to see Memory Edwardo on Monday     Called patient and spoke with him. Advised patient of above results and POC.   Patient

## 2018-08-31 ENCOUNTER — OFFICE VISIT (OUTPATIENT)
Dept: FAMILY MEDICINE CLINIC | Facility: CLINIC | Age: 48
End: 2018-08-31
Payer: COMMERCIAL

## 2018-08-31 VITALS
RESPIRATION RATE: 16 BRPM | OXYGEN SATURATION: 98 % | DIASTOLIC BLOOD PRESSURE: 90 MMHG | WEIGHT: 286 LBS | BODY MASS INDEX: 37.1 KG/M2 | HEART RATE: 60 BPM | SYSTOLIC BLOOD PRESSURE: 134 MMHG | TEMPERATURE: 98 F | HEIGHT: 73.5 IN

## 2018-08-31 DIAGNOSIS — M79.671 CHRONIC HEEL PAIN, RIGHT: ICD-10-CM

## 2018-08-31 DIAGNOSIS — Z13.228 SCREENING FOR ENDOCRINE, NUTRITIONAL, METABOLIC AND IMMUNITY DISORDER: Primary | ICD-10-CM

## 2018-08-31 DIAGNOSIS — I10 ESSENTIAL HYPERTENSION: ICD-10-CM

## 2018-08-31 DIAGNOSIS — Z12.5 SCREENING FOR PROSTATE CANCER: ICD-10-CM

## 2018-08-31 DIAGNOSIS — Z13.21 SCREENING FOR ENDOCRINE, NUTRITIONAL, METABOLIC AND IMMUNITY DISORDER: Primary | ICD-10-CM

## 2018-08-31 DIAGNOSIS — R06.02 SOB (SHORTNESS OF BREATH) ON EXERTION: ICD-10-CM

## 2018-08-31 DIAGNOSIS — Z13.29 SCREENING FOR ENDOCRINE, NUTRITIONAL, METABOLIC AND IMMUNITY DISORDER: Primary | ICD-10-CM

## 2018-08-31 DIAGNOSIS — Z13.0 SCREENING FOR ENDOCRINE, NUTRITIONAL, METABOLIC AND IMMUNITY DISORDER: Primary | ICD-10-CM

## 2018-08-31 DIAGNOSIS — G89.29 CHRONIC HEEL PAIN, RIGHT: ICD-10-CM

## 2018-08-31 DIAGNOSIS — Z95.1 HX OF CABG: ICD-10-CM

## 2018-08-31 PROCEDURE — 93000 ELECTROCARDIOGRAM COMPLETE: CPT | Performed by: NURSE PRACTITIONER

## 2018-08-31 PROCEDURE — 99214 OFFICE O/P EST MOD 30 MIN: CPT | Performed by: NURSE PRACTITIONER

## 2018-08-31 RX ORDER — METOPROLOL SUCCINATE 50 MG/1
50 TABLET, EXTENDED RELEASE ORAL DAILY
Qty: 90 TABLET | Refills: 0 | Status: SHIPPED | OUTPATIENT
Start: 2018-08-31 | End: 2018-12-23

## 2018-08-31 NOTE — PATIENT INSTRUCTIONS
Understanding Heel Pain    Your heel is the back part of your foot. A band of tissue called the plantar fascia connects the heel bone to the bones in the ball of your foot. Nerves run from the heel up the inside of your ankle and into your leg.  When you

## 2018-08-31 NOTE — PROGRESS NOTES
Patient ID: Roseann Reagan. is a 49year old male. Patient presents with: Foot Pain: right foot/heel pain      Patient presenting to the clinic today with right heel pain. This is a chronic problem.  Was previously taking diclofenac but was advised Neurological: Negative for dizziness, syncope, weakness, light-headedness, numbness and headaches. Hematological: Negative for adenopathy. Does not bruise/bleed easily.    Psychiatric/Behavioral: Negative for suicidal ideas, behavioral problems, confusion PHYSICAL EXAM:   Blood pressure 134/90, pulse 60, temperature 97.9 °F (36.6 °C), temperature source Oral, resp. rate 16, height 73.5\", weight 286 lb, SpO2 98 %. Body mass index is 37.22 kg/m².     Physical Exam    Constitutional: He is oriented to person, Chronic heel pain, right  -Discussed need for proper support shoes  -F/u with podiatry for re-eval and possible repeat injections   -May use ice or heat intermittently   -Heel exercises as discussed     SOB (shortness of breath) on exertion  -EKG in office

## 2018-09-01 ENCOUNTER — LAB ENCOUNTER (OUTPATIENT)
Dept: LAB | Age: 48
End: 2018-09-01
Attending: NURSE PRACTITIONER
Payer: COMMERCIAL

## 2018-09-01 DIAGNOSIS — Z12.5 SCREENING FOR PROSTATE CANCER: ICD-10-CM

## 2018-09-01 DIAGNOSIS — Z13.21 SCREENING FOR ENDOCRINE, NUTRITIONAL, METABOLIC AND IMMUNITY DISORDER: ICD-10-CM

## 2018-09-01 DIAGNOSIS — Z13.228 SCREENING FOR ENDOCRINE, NUTRITIONAL, METABOLIC AND IMMUNITY DISORDER: ICD-10-CM

## 2018-09-01 DIAGNOSIS — Z13.0 SCREENING FOR ENDOCRINE, NUTRITIONAL, METABOLIC AND IMMUNITY DISORDER: ICD-10-CM

## 2018-09-01 DIAGNOSIS — Z13.29 SCREENING FOR ENDOCRINE, NUTRITIONAL, METABOLIC AND IMMUNITY DISORDER: ICD-10-CM

## 2018-09-01 LAB
ALBUMIN SERPL-MCNC: 4 G/DL (ref 3.5–4.8)
ALBUMIN/GLOB SERPL: 1.2 {RATIO} (ref 1–2)
ALP LIVER SERPL-CCNC: 62 U/L (ref 45–117)
ALT SERPL-CCNC: 38 U/L (ref 17–63)
ANION GAP SERPL CALC-SCNC: 7 MMOL/L (ref 0–18)
AST SERPL-CCNC: 17 U/L (ref 15–41)
BASOPHILS # BLD AUTO: 0.04 X10(3) UL (ref 0–0.1)
BASOPHILS NFR BLD AUTO: 0.7 %
BILIRUB SERPL-MCNC: 0.5 MG/DL (ref 0.1–2)
BUN BLD-MCNC: 16 MG/DL (ref 8–20)
BUN/CREAT SERPL: 17.4 (ref 10–20)
CALCIUM BLD-MCNC: 9.2 MG/DL (ref 8.3–10.3)
CHLORIDE SERPL-SCNC: 105 MMOL/L (ref 101–111)
CHOLEST SMN-MCNC: 126 MG/DL (ref ?–200)
CO2 SERPL-SCNC: 26 MMOL/L (ref 22–32)
COMPLEXED PSA SERPL-MCNC: 0.9 NG/ML (ref 0.01–4)
CREAT BLD-MCNC: 0.92 MG/DL (ref 0.7–1.3)
EOSINOPHIL # BLD AUTO: 0.13 X10(3) UL (ref 0–0.3)
EOSINOPHIL NFR BLD AUTO: 2.4 %
ERYTHROCYTE [DISTWIDTH] IN BLOOD BY AUTOMATED COUNT: 12.4 % (ref 11.5–16)
GLOBULIN PLAS-MCNC: 3.4 G/DL (ref 2.5–4)
GLUCOSE BLD-MCNC: 97 MG/DL (ref 70–99)
HCT VFR BLD AUTO: 46 % (ref 37–53)
HDLC SERPL-MCNC: 47 MG/DL (ref 40–59)
HGB BLD-MCNC: 14.4 G/DL (ref 13–17)
IMMATURE GRANULOCYTE COUNT: 0.13 X10(3) UL (ref 0–1)
IMMATURE GRANULOCYTE RATIO %: 2.4 %
LDLC SERPL CALC-MCNC: 51 MG/DL (ref ?–100)
LYMPHOCYTES # BLD AUTO: 1.31 X10(3) UL (ref 0.9–4)
LYMPHOCYTES NFR BLD AUTO: 23.9 %
M PROTEIN MFR SERPL ELPH: 7.4 G/DL (ref 6.1–8.3)
MCH RBC QN AUTO: 30.5 PG (ref 27–33.2)
MCHC RBC AUTO-ENTMCNC: 31.3 G/DL (ref 31–37)
MCV RBC AUTO: 97.5 FL (ref 80–99)
MONOCYTES # BLD AUTO: 0.43 X10(3) UL (ref 0.1–1)
MONOCYTES NFR BLD AUTO: 7.8 %
NEUTROPHIL ABS PRELIM: 3.44 X10 (3) UL (ref 1.3–6.7)
NEUTROPHILS # BLD AUTO: 3.44 X10(3) UL (ref 1.3–6.7)
NEUTROPHILS NFR BLD AUTO: 62.8 %
NONHDLC SERPL-MCNC: 79 MG/DL (ref ?–130)
OSMOLALITY SERPL CALC.SUM OF ELEC: 287 MOSM/KG (ref 275–295)
PLATELET # BLD AUTO: 276 10(3)UL (ref 150–450)
POTASSIUM SERPL-SCNC: 4.7 MMOL/L (ref 3.6–5.1)
RBC # BLD AUTO: 4.72 X10(6)UL (ref 4.3–5.7)
RED CELL DISTRIBUTION WIDTH-SD: 44.9 FL (ref 35.1–46.3)
SODIUM SERPL-SCNC: 138 MMOL/L (ref 136–144)
TRIGL SERPL-MCNC: 140 MG/DL (ref 30–149)
TSI SER-ACNC: 0.85 MIU/ML (ref 0.35–5.5)
VIT D+METAB SERPL-MCNC: 19.2 NG/ML (ref 30–100)
VLDLC SERPL CALC-MCNC: 28 MG/DL (ref 0–30)
WBC # BLD AUTO: 5.5 X10(3) UL (ref 4–13)

## 2018-09-01 PROCEDURE — 36415 COLL VENOUS BLD VENIPUNCTURE: CPT | Performed by: NURSE PRACTITIONER

## 2018-09-01 PROCEDURE — 80061 LIPID PANEL: CPT | Performed by: NURSE PRACTITIONER

## 2018-09-01 PROCEDURE — 84153 ASSAY OF PSA TOTAL: CPT | Performed by: NURSE PRACTITIONER

## 2018-09-01 PROCEDURE — 80050 GENERAL HEALTH PANEL: CPT | Performed by: NURSE PRACTITIONER

## 2018-09-01 PROCEDURE — 82306 VITAMIN D 25 HYDROXY: CPT | Performed by: NURSE PRACTITIONER

## 2018-09-02 ENCOUNTER — MED REC SCAN ONLY (OUTPATIENT)
Dept: FAMILY MEDICINE CLINIC | Facility: CLINIC | Age: 48
End: 2018-09-02

## 2018-09-07 DIAGNOSIS — R79.89 LOW VITAMIN D LEVEL: Primary | ICD-10-CM

## 2018-09-07 RX ORDER — ERGOCALCIFEROL 1.25 MG/1
50000 CAPSULE ORAL WEEKLY
Qty: 4 CAPSULE | Refills: 3 | Status: SHIPPED | OUTPATIENT
Start: 2018-09-07 | End: 2018-09-25

## 2018-09-08 ENCOUNTER — HOSPITAL ENCOUNTER (OUTPATIENT)
Dept: CV DIAGNOSTICS | Facility: HOSPITAL | Age: 48
Discharge: HOME | End: 2018-09-08
Attending: NURSE PRACTITIONER
Payer: COMMERCIAL

## 2018-09-08 DIAGNOSIS — Z95.1 HX OF CABG: ICD-10-CM

## 2018-09-08 DIAGNOSIS — R06.02 SOB (SHORTNESS OF BREATH) ON EXERTION: ICD-10-CM

## 2018-09-08 PROCEDURE — 93350 STRESS TTE ONLY: CPT | Performed by: NURSE PRACTITIONER

## 2018-09-08 PROCEDURE — 93017 CV STRESS TEST TRACING ONLY: CPT | Performed by: NURSE PRACTITIONER

## 2018-09-08 PROCEDURE — 93018 CV STRESS TEST I&R ONLY: CPT | Performed by: NURSE PRACTITIONER

## 2018-09-10 ENCOUNTER — TELEPHONE (OUTPATIENT)
Dept: FAMILY MEDICINE CLINIC | Facility: CLINIC | Age: 48
End: 2018-09-10

## 2018-09-25 ENCOUNTER — HOSPITAL ENCOUNTER (OUTPATIENT)
Dept: GENERAL RADIOLOGY | Age: 48
Discharge: HOME | End: 2018-09-25
Attending: FAMILY MEDICINE
Payer: COMMERCIAL

## 2018-09-25 ENCOUNTER — OFFICE VISIT (OUTPATIENT)
Dept: FAMILY MEDICINE CLINIC | Facility: CLINIC | Age: 48
End: 2018-09-25
Payer: COMMERCIAL

## 2018-09-25 ENCOUNTER — OFFICE VISIT (OUTPATIENT)
Dept: PODIATRY CLINIC | Facility: CLINIC | Age: 48
End: 2018-09-25
Payer: COMMERCIAL

## 2018-09-25 VITALS
WEIGHT: 282 LBS | RESPIRATION RATE: 16 BRPM | SYSTOLIC BLOOD PRESSURE: 140 MMHG | BODY MASS INDEX: 36.58 KG/M2 | TEMPERATURE: 98 F | HEIGHT: 73.5 IN | HEART RATE: 60 BPM | DIASTOLIC BLOOD PRESSURE: 84 MMHG

## 2018-09-25 VITALS — HEART RATE: 60 BPM | SYSTOLIC BLOOD PRESSURE: 148 MMHG | DIASTOLIC BLOOD PRESSURE: 84 MMHG | RESPIRATION RATE: 20 BRPM

## 2018-09-25 DIAGNOSIS — M72.2 PLANTAR FASCIITIS OF RIGHT FOOT: Primary | ICD-10-CM

## 2018-09-25 DIAGNOSIS — I10 ESSENTIAL HYPERTENSION: ICD-10-CM

## 2018-09-25 DIAGNOSIS — G89.29 CHRONIC ELBOW PAIN, RIGHT: ICD-10-CM

## 2018-09-25 DIAGNOSIS — E66.01 MORBID OBESITY WITH BMI OF 40.0-44.9, ADULT (HCC): ICD-10-CM

## 2018-09-25 DIAGNOSIS — Z23 NEED FOR VACCINATION: ICD-10-CM

## 2018-09-25 DIAGNOSIS — M25.521 CHRONIC ELBOW PAIN, RIGHT: ICD-10-CM

## 2018-09-25 DIAGNOSIS — Z95.1 S/P CABG X 4: ICD-10-CM

## 2018-09-25 DIAGNOSIS — I25.10 CORONARY ARTERY DISEASE INVOLVING NATIVE HEART, ANGINA PRESENCE UNSPECIFIED, UNSPECIFIED VESSEL OR LESION TYPE: ICD-10-CM

## 2018-09-25 DIAGNOSIS — R79.89 LOW VITAMIN D LEVEL: ICD-10-CM

## 2018-09-25 DIAGNOSIS — R73.03 BORDERLINE DIABETES: ICD-10-CM

## 2018-09-25 DIAGNOSIS — E78.5 HYPERLIPIDEMIA WITH TARGET LOW DENSITY LIPOPROTEIN (LDL) CHOLESTEROL LESS THAN 70 MG/DL: ICD-10-CM

## 2018-09-25 DIAGNOSIS — Z02.89 ENCOUNTER FOR EXAMINATION REQUIRED BY DEPARTMENT OF TRANSPORTATION (DOT): Primary | ICD-10-CM

## 2018-09-25 PROBLEM — R10.10 UPPER ABDOMINAL PAIN: Status: RESOLVED | Noted: 2018-01-02 | Resolved: 2018-09-25

## 2018-09-25 LAB
APPEARANCE: CLEAR
BILIRUBIN: NEGATIVE
GLUCOSE (URINE DIPSTICK): NEGATIVE MG/DL
KETONES (URINE DIPSTICK): NEGATIVE MG/DL
LEUKOCYTES: NEGATIVE
MULTISTIX LOT#: NORMAL NUMERIC
NITRITE, URINE: NEGATIVE
OCCULT BLOOD: NEGATIVE
PH, URINE: 7 (ref 4.5–8)
PROTEIN (URINE DIPSTICK): NEGATIVE MG/DL
SPECIFIC GRAVITY: 1.02 (ref 1–1.03)
URINE-COLOR: YELLOW
UROBILINOGEN,SEMI-QN: 0.2 MG/DL (ref 0–1.9)

## 2018-09-25 PROCEDURE — 20550 NJX 1 TENDON SHEATH/LIGAMENT: CPT | Performed by: PODIATRIST

## 2018-09-25 PROCEDURE — 99215 OFFICE O/P EST HI 40 MIN: CPT | Performed by: FAMILY MEDICINE

## 2018-09-25 PROCEDURE — 81003 URINALYSIS AUTO W/O SCOPE: CPT | Performed by: FAMILY MEDICINE

## 2018-09-25 PROCEDURE — 73080 X-RAY EXAM OF ELBOW: CPT | Performed by: FAMILY MEDICINE

## 2018-09-25 PROCEDURE — 99213 OFFICE O/P EST LOW 20 MIN: CPT | Performed by: PODIATRIST

## 2018-09-25 PROCEDURE — 90471 IMMUNIZATION ADMIN: CPT | Performed by: FAMILY MEDICINE

## 2018-09-25 PROCEDURE — 90686 IIV4 VACC NO PRSV 0.5 ML IM: CPT | Performed by: FAMILY MEDICINE

## 2018-09-25 RX ORDER — ERGOCALCIFEROL 1.25 MG/1
50000 CAPSULE ORAL WEEKLY
Qty: 4 CAPSULE | Refills: 3 | Status: SHIPPED | OUTPATIENT
Start: 2018-09-25 | End: 2019-08-02 | Stop reason: ALTCHOICE

## 2018-09-25 RX ORDER — TRIAMCINOLONE ACETONIDE 40 MG/ML
40 INJECTION, SUSPENSION INTRA-ARTICULAR; INTRAMUSCULAR ONCE
Status: COMPLETED | OUTPATIENT
Start: 2018-09-25 | End: 2018-09-25

## 2018-09-25 RX ADMIN — TRIAMCINOLONE ACETONIDE 40 MG: 40 INJECTION, SUSPENSION INTRA-ARTICULAR; INTRAMUSCULAR at 11:43:00

## 2018-09-25 NOTE — PROGRESS NOTES
Per Dr Khadra Hardy, draw up 1 cc of Kenalog 40 and 1 cc of 0.5 % Marcaine for injection to right heel.  RR

## 2018-09-25 NOTE — PROGRESS NOTES
Chief Complaint:   Patient presents with:  Employment Physical: [employer] physical     HPI:   This is a 49year old male presenting for DOT physical.     Patient has a history of morbid obesity currently under control with diet control has been losing weight.   P Performed by Phillip Guzman MD at [facility] History:  Social History    Tobacco Use      Smoking status: Former Smoker        Packs/day: 1.00        Years: 27.00        Pack years: 27        Types: Cigarettes        Quit date: 8/15/2013 Allergic/Immunologic: Negative for environmental allergies, food allergies and immunocompromised state. Neurological: Negative for dizziness, weakness, light-headedness and headaches. Hematological: Negative for adenopathy.  Does not bruise/bleed easi Neurological: He is alert and oriented to person, place, and time. No cranial nerve deficit or motor deficit. Gait normal.   Skin: Skin is warm and dry. No lesion and no rash noted. He is not diaphoretic. Psychiatric: He has a normal mood and affect.  H

## 2018-09-26 NOTE — PROGRESS NOTES
Horacio Burlison. is a 49year old male. Patient presents with:  Heel Pain: Right -- Rates pain 8/10 and worse in the AM. States he had cortisone in the past and it did help. Pt was seen at [facility] office.          HPI:   This pleasant gentleman presents PDA  No date: CARDIAC CATH LAB      Comment:  On 8/14/2013: cardiac cath showed 3-vessel disease  8/16/2013: HEART CORONARY ARTERY BYPASS GRAFT; N/A      Comment:  Performed by Bebeto Haney MD at [address]  History   Problem Relation Age abdominal pain and denies heartburn  NEURO: denies headaches    EXAM:   /84 (BP Location: Right arm, Patient Position: Sitting, Cuff Size: large)   Pulse 60   Resp 20   Physical Exam  GENERAL: well developed, well nourished, in no apparent distress

## 2018-10-05 RX ORDER — ATORVASTATIN CALCIUM 20 MG/1
TABLET, FILM COATED ORAL
Qty: 90 TABLET | Refills: 0 | Status: SHIPPED | OUTPATIENT
Start: 2018-10-05 | End: 2018-12-23

## 2018-10-17 ENCOUNTER — OFFICE VISIT (OUTPATIENT)
Dept: FAMILY MEDICINE CLINIC | Facility: CLINIC | Age: 48
End: 2018-10-17
Payer: COMMERCIAL

## 2018-10-17 ENCOUNTER — HOSPITAL ENCOUNTER (OUTPATIENT)
Dept: GENERAL RADIOLOGY | Age: 48
Discharge: HOME | End: 2018-10-17
Attending: NURSE PRACTITIONER
Payer: COMMERCIAL

## 2018-10-17 VITALS
WEIGHT: 286 LBS | SYSTOLIC BLOOD PRESSURE: 152 MMHG | RESPIRATION RATE: 18 BRPM | OXYGEN SATURATION: 98 % | BODY MASS INDEX: 37.91 KG/M2 | DIASTOLIC BLOOD PRESSURE: 86 MMHG | HEIGHT: 73 IN | TEMPERATURE: 98 F | HEART RATE: 67 BPM

## 2018-10-17 DIAGNOSIS — T14.8XXA CRUSHING INJURY: ICD-10-CM

## 2018-10-17 DIAGNOSIS — T14.8XXA CRUSHING INJURY: Primary | ICD-10-CM

## 2018-10-17 DIAGNOSIS — Z23 NEED FOR VACCINE FOR TD (TETANUS-DIPHTHERIA): ICD-10-CM

## 2018-10-17 PROCEDURE — 90715 TDAP VACCINE 7 YRS/> IM: CPT | Performed by: NURSE PRACTITIONER

## 2018-10-17 PROCEDURE — 99213 OFFICE O/P EST LOW 20 MIN: CPT | Performed by: NURSE PRACTITIONER

## 2018-10-17 PROCEDURE — 73140 X-RAY EXAM OF FINGER(S): CPT | Performed by: NURSE PRACTITIONER

## 2018-10-17 PROCEDURE — 90471 IMMUNIZATION ADMIN: CPT | Performed by: NURSE PRACTITIONER

## 2018-10-17 RX ORDER — AMOXICILLIN AND CLAVULANATE POTASSIUM 875; 125 MG/1; MG/1
1 TABLET, FILM COATED ORAL 2 TIMES DAILY
Qty: 20 TABLET | Refills: 0 | Status: SHIPPED | OUTPATIENT
Start: 2018-10-17 | End: 2018-10-27

## 2018-10-17 NOTE — PROGRESS NOTES
Chief Complaint:   Patient presents with:  ER F/U: split finger right pointer finger     HPI:   This is a 51year old male presenting with crushing injury to right index finger.  The injury occurred at work yesterday when a heavy piece of equipment smashed Smoking status: Former Smoker        Packs/day: 1.00        Years: 27.00        Pack years: 27        Types: Cigarettes        Quit date: 8/15/2013        Years since quittin.1      Smokeless tobacco: Never Used    Alcohol use: No    Drug use:  No Left Ear: Tympanic membrane normal.   Mouth/Throat: Uvula is midline, oropharynx is clear and moist and mucous membranes are normal.   Eyes: Conjunctivae and EOM are normal. Pupils are equal, round, and reactive to light. Neck: Normal range of motion.  Ne

## 2018-10-17 NOTE — PATIENT INSTRUCTIONS
Amoxicillin; Clavulanic Acid tablets  Brand Name: Augmentin  What is this medicine? AMOXICILLIN; CLAVULANIC ACID (a mox i KAILYN in; DONY pena ic AS id) is a penicillin antibiotic. It is used to treat certain kinds of bacterial infections.  It will not w If you miss a dose, take it as soon as you can. If it is almost time for your next dose, take only that dose. Do not take double or extra doses. Where should I keep my medicine? Keep out of the reach of children.   Store at room temperature below 25 degre · Dirt or particles were deep into the wound at the time of injury  · You have diabetes, HIV infection, or other problem that affects your immune system  · You are taking medicine that affects your immune system  · Home care of the laceration was not follo · Increase in pus coming from the wound  · Fever of 100.4°F (38°C) or higher, or as directed by your healthcare provider  Date Last Reviewed: 7/1/2017  © 0563-9451 [employer] [address]. [address] [facility], [address] [place] [address]. All rights reserved.  Shemar Grey

## 2018-10-19 ENCOUNTER — OFFICE VISIT (OUTPATIENT)
Dept: FAMILY MEDICINE CLINIC | Facility: CLINIC | Age: 48
End: 2018-10-19
Payer: COMMERCIAL

## 2018-10-19 DIAGNOSIS — S61.219A FINGER LACERATION, INITIAL ENCOUNTER: Primary | ICD-10-CM

## 2018-10-19 DIAGNOSIS — T14.8XXA CRUSH INJURY: ICD-10-CM

## 2018-10-19 PROCEDURE — 99214 OFFICE O/P EST MOD 30 MIN: CPT | Performed by: FAMILY MEDICINE

## 2018-10-19 NOTE — PROGRESS NOTES
Sandoval Cervantes. is a 51year old male.   HPI:   Patient's presenting with an injury to right index finger  It occurred: 10/15/18  Mechanism of injury: crush injury with 3 sutures placed this past monday  There's pain with movement: and swelling and lace 4     On 8/16/13: CABG x 4 with LIMA to LAD and SVG to diagonal, OM and PDA     Past Surgical History:   Procedure Laterality Date   • APPENDECTOMY     • CABG      On 8/16/13: CABG x 4 with LIMA to LAD and SVG to diagonal, OM and PDA   • CARDIAC CATH LAB murmur heard. Pulmonary/Chest: Effort normal and breath sounds normal. No stridor. No respiratory distress. He has no rales. He exhibits no tenderness. Abdominal: Soft. Bowel sounds are normal. He exhibits no distension and no mass.  There is no tenderne

## 2018-12-23 DIAGNOSIS — I10 ESSENTIAL HYPERTENSION: ICD-10-CM

## 2018-12-24 RX ORDER — ATORVASTATIN CALCIUM 20 MG/1
TABLET, FILM COATED ORAL
Qty: 90 TABLET | Refills: 0 | Status: SHIPPED | OUTPATIENT
Start: 2018-12-24 | End: 2019-04-22

## 2018-12-26 RX ORDER — METOPROLOL SUCCINATE 50 MG/1
50 TABLET, EXTENDED RELEASE ORAL DAILY
Qty: 90 TABLET | Refills: 0 | Status: SHIPPED | OUTPATIENT
Start: 2018-12-26 | End: 2019-04-20

## 2019-04-20 DIAGNOSIS — I10 ESSENTIAL HYPERTENSION: ICD-10-CM

## 2019-04-22 DIAGNOSIS — I10 ESSENTIAL HYPERTENSION: ICD-10-CM

## 2019-04-22 RX ORDER — ATORVASTATIN CALCIUM 20 MG/1
TABLET, FILM COATED ORAL
Qty: 90 TABLET | Refills: 0 | Status: SHIPPED | OUTPATIENT
Start: 2019-04-22 | End: 2019-06-08

## 2019-04-22 RX ORDER — ATORVASTATIN CALCIUM 20 MG/1
TABLET, FILM COATED ORAL
Qty: 90 TABLET | Refills: 0 | OUTPATIENT
Start: 2019-04-22

## 2019-04-22 RX ORDER — METOPROLOL SUCCINATE 50 MG/1
TABLET, EXTENDED RELEASE ORAL
Qty: 30 TABLET | Refills: 0 | OUTPATIENT
Start: 2019-04-22

## 2019-04-22 RX ORDER — METOPROLOL SUCCINATE 50 MG/1
50 TABLET, EXTENDED RELEASE ORAL DAILY
Qty: 90 TABLET | Refills: 0 | OUTPATIENT
Start: 2019-04-22

## 2019-04-22 RX ORDER — METOPROLOL SUCCINATE 50 MG/1
50 TABLET, EXTENDED RELEASE ORAL DAILY
Qty: 90 TABLET | Refills: 0 | Status: SHIPPED | OUTPATIENT
Start: 2019-04-22 | End: 2019-05-24

## 2019-04-22 NOTE — TELEPHONE ENCOUNTER
Medication(s) to Refill:   Requested Prescriptions     Pending Prescriptions Disp Refills   • Metoprolol Succinate ER 50 MG Oral Tablet 24 Hr 90 tablet 0     Sig: Take 1 tablet (50 mg total) by mouth daily.          Reason for Medication Refill being sent t

## 2019-04-22 NOTE — TELEPHONE ENCOUNTER
Medication(s) to Refill:   Requested Prescriptions     Pending Prescriptions Disp Refills   • atorvastatin 20 MG Oral Tab 90 tablet 0     Sig: TAKE 1 TABLET BY MOUTH DAILY AT BEDTIME         Reason for Medication Refill being sent to Provider / Reason Prot

## 2019-04-23 VITALS
HEART RATE: 78 BPM | DIASTOLIC BLOOD PRESSURE: 80 MMHG | TEMPERATURE: 98 F | RESPIRATION RATE: 16 BRPM | SYSTOLIC BLOOD PRESSURE: 132 MMHG

## 2019-05-24 DIAGNOSIS — I10 ESSENTIAL HYPERTENSION: ICD-10-CM

## 2019-05-24 RX ORDER — METOPROLOL SUCCINATE 50 MG/1
50 TABLET, EXTENDED RELEASE ORAL DAILY
Qty: 90 TABLET | Refills: 0 | Status: SHIPPED | OUTPATIENT
Start: 2019-05-24 | End: 2019-10-13

## 2019-05-28 ENCOUNTER — OFFICE VISIT (OUTPATIENT)
Dept: FAMILY MEDICINE CLINIC | Facility: CLINIC | Age: 49
End: 2019-05-28
Payer: COMMERCIAL

## 2019-05-28 VITALS
RESPIRATION RATE: 16 BRPM | OXYGEN SATURATION: 97 % | SYSTOLIC BLOOD PRESSURE: 138 MMHG | DIASTOLIC BLOOD PRESSURE: 90 MMHG | BODY MASS INDEX: 37.22 KG/M2 | HEIGHT: 74 IN | HEART RATE: 81 BPM | WEIGHT: 290 LBS | TEMPERATURE: 98 F

## 2019-05-28 DIAGNOSIS — R05.9 COUGH: ICD-10-CM

## 2019-05-28 DIAGNOSIS — H65.92 LEFT NON-SUPPURATIVE OTITIS MEDIA: Primary | ICD-10-CM

## 2019-05-28 PROCEDURE — 99213 OFFICE O/P EST LOW 20 MIN: CPT | Performed by: PHYSICIAN ASSISTANT

## 2019-05-28 RX ORDER — AMOXICILLIN AND CLAVULANATE POTASSIUM 875; 125 MG/1; MG/1
1 TABLET, FILM COATED ORAL 2 TIMES DAILY
Qty: 20 TABLET | Refills: 0 | Status: SHIPPED | OUTPATIENT
Start: 2019-05-28 | End: 2019-06-07

## 2019-05-28 RX ORDER — BENZONATATE 200 MG/1
200 CAPSULE ORAL 3 TIMES DAILY PRN
Qty: 30 CAPSULE | Refills: 0 | Status: SHIPPED | OUTPATIENT
Start: 2019-05-28 | End: 2019-06-07

## 2019-05-28 NOTE — PATIENT INSTRUCTIONS
Flonase OTC   Rest   Fluids   Continue Zyrtec    Cough medication as needed   Please follow up with PCP if no improvement or if symptoms worsen

## 2019-05-28 NOTE — PROGRESS NOTES
CHIEF COMPLAINT:   Patient presents with:  Cough: nasal congestion, watery eyes x yesterday      HPI:   Barbara Dickson. is a 51year old male presents to clinic with symptoms of congestion for 2 days. Started with sore throat. +nasal congestion.  No ear status: Former Smoker        Packs/day: 1.00        Years: 27.00        Pack years: 27        Types: Cigarettes        Quit date: 8/15/2013        Years since quittin.7      Smokeless tobacco: Never Used    Alcohol use: No    Drug use: No       REVIEW Prescriptions Disp Refills   • Amoxicillin-Pot Clavulanate 875-125 MG Oral Tab 20 tablet 0     Sig: Take 1 tablet by mouth 2 (two) times daily for 10 days.    • benzonatate 200 MG Oral Cap 30 capsule 0     Sig: Take 1 capsule (200 mg total) by mouth 3 (thre

## 2019-06-10 RX ORDER — ATORVASTATIN CALCIUM 20 MG/1
TABLET, FILM COATED ORAL
Qty: 90 TABLET | Refills: 0 | Status: SHIPPED | OUTPATIENT
Start: 2019-06-10 | End: 2019-10-13

## 2019-07-31 ENCOUNTER — OFFICE VISIT (OUTPATIENT)
Dept: FAMILY MEDICINE CLINIC | Facility: CLINIC | Age: 49
End: 2019-07-31

## 2019-07-31 ENCOUNTER — TELEPHONE (OUTPATIENT)
Dept: FAMILY MEDICINE CLINIC | Facility: CLINIC | Age: 49
End: 2019-07-31

## 2019-07-31 DIAGNOSIS — Z02.9 ADMINISTRATIVE ENCOUNTER: Primary | ICD-10-CM

## 2019-07-31 NOTE — PROGRESS NOTES
Pt. To [facility], reports he came here hoping to see Dr. Trina Woods.  He tried to sign in with him as he was all full, but was told to try [facility] because sometimes he will see his patients.   He is a patient of Dr. Trina Woods and reports has been having a very difficult t

## 2019-08-02 NOTE — PROGRESS NOTES
[facility] Office Note  Chief Complaint:   Patient presents with: Follow - Up: depression       HPI:   This is a 51year old male coming in for  HPI  The patient complains of depression. Pt has had these sx's for a few weeks.  Sx' Allergies:  No Known Allergies  Current Meds:    Current Outpatient Medications:  ALPRAZolam 0.5 MG Oral Tab Take 1 tablet (0.5 mg total) by mouth 2 (two) times daily as needed for Anxiety.  Disp: 20 tablet Rfl: 0   atorvastatin 20 MG Oral Tab TAKE 1 TA • ALPRAZolam 0.5 MG Oral Tab 20 tablet 0     Sig: Take 1 tablet (0.5 mg total) by mouth 2 (two) times daily as needed for Anxiety.        Health Maintenance:  Annual Physical due on 11/04/2017  Annual Depression Screen due on 08/31/2019    Patient/Caregiv

## 2019-08-13 ENCOUNTER — TELEPHONE (OUTPATIENT)
Dept: FAMILY MEDICINE CLINIC | Facility: CLINIC | Age: 49
End: 2019-08-13

## 2019-08-14 RX ORDER — ALPRAZOLAM 0.5 MG/1
0.5 TABLET ORAL 2 TIMES DAILY PRN
Qty: 20 TABLET | Refills: 0
Start: 2019-08-14

## 2019-08-14 NOTE — TELEPHONE ENCOUNTER
Medication(s) to Refill:   Requested Prescriptions     Pending Prescriptions Disp Refills   • ALPRAZolam 0.5 MG Oral Tab 20 tablet 0     Sig: Take 1 tablet (0.5 mg total) by mouth 2 (two) times daily as needed for Anxiety.          Reason for Medication Ref

## 2019-08-15 RX ORDER — ALPRAZOLAM 0.5 MG/1
0.5 TABLET ORAL 2 TIMES DAILY PRN
Qty: 20 TABLET | Refills: 0 | Status: CANCELLED | OUTPATIENT
Start: 2019-08-15

## 2019-08-15 RX ORDER — ALPRAZOLAM 0.5 MG/1
0.5 TABLET ORAL 2 TIMES DAILY PRN
Qty: 20 TABLET | Refills: 0 | Status: SHIPPED
Start: 2019-08-15 | End: 2019-08-16

## 2019-08-15 NOTE — TELEPHONE ENCOUNTER
Will approve Rx, however alprazolam should be used as needed and not daily. If he needs it every day, he should schedule OV to discuss alterative options for daily management of anxiety symptoms.

## 2019-08-19 ENCOUNTER — OFFICE VISIT (OUTPATIENT)
Dept: FAMILY MEDICINE CLINIC | Facility: CLINIC | Age: 49
End: 2019-08-19
Payer: COMMERCIAL

## 2019-08-19 ENCOUNTER — LAB ENCOUNTER (OUTPATIENT)
Dept: LAB | Age: 49
End: 2019-08-19
Attending: FAMILY MEDICINE

## 2019-08-19 VITALS
SYSTOLIC BLOOD PRESSURE: 130 MMHG | DIASTOLIC BLOOD PRESSURE: 78 MMHG | HEIGHT: 74 IN | WEIGHT: 287 LBS | TEMPERATURE: 98 F | RESPIRATION RATE: 16 BRPM | BODY MASS INDEX: 36.83 KG/M2 | HEART RATE: 60 BPM

## 2019-08-19 DIAGNOSIS — R79.89 LOW VITAMIN D LEVEL: ICD-10-CM

## 2019-08-19 DIAGNOSIS — Z95.1 S/P CABG X 4: ICD-10-CM

## 2019-08-19 DIAGNOSIS — Z02.89 ENCOUNTER FOR EXAMINATION REQUIRED BY DEPARTMENT OF TRANSPORTATION (DOT): ICD-10-CM

## 2019-08-19 DIAGNOSIS — Z00.00 LABORATORY EXAMINATION ORDERED AS PART OF A ROUTINE GENERAL MEDICAL EXAMINATION: ICD-10-CM

## 2019-08-19 DIAGNOSIS — R73.03 BORDERLINE DIABETES: ICD-10-CM

## 2019-08-19 DIAGNOSIS — E66.01 MORBID OBESITY WITH BMI OF 40.0-44.9, ADULT (HCC): ICD-10-CM

## 2019-08-19 DIAGNOSIS — I10 ESSENTIAL HYPERTENSION: ICD-10-CM

## 2019-08-19 DIAGNOSIS — Z00.00 ANNUAL PHYSICAL EXAM: Primary | ICD-10-CM

## 2019-08-19 DIAGNOSIS — Z12.5 SCREENING PSA (PROSTATE SPECIFIC ANTIGEN): ICD-10-CM

## 2019-08-19 LAB
ALBUMIN SERPL-MCNC: 4.2 G/DL (ref 3.4–5)
ALBUMIN/GLOB SERPL: 1.4 {RATIO} (ref 1–2)
ALP LIVER SERPL-CCNC: 77 U/L (ref 45–117)
ALT SERPL-CCNC: 43 U/L (ref 16–61)
ANION GAP SERPL CALC-SCNC: 9 MMOL/L (ref 0–18)
APPEARANCE: CLEAR
AST SERPL-CCNC: 17 U/L (ref 15–37)
BASOPHILS # BLD AUTO: 0.04 X10(3) UL (ref 0–0.2)
BASOPHILS NFR BLD AUTO: 0.8 %
BILIRUB SERPL-MCNC: 0.4 MG/DL (ref 0.1–2)
BILIRUBIN: NEGATIVE
BUN BLD-MCNC: 12 MG/DL (ref 7–18)
BUN/CREAT SERPL: 13.5 (ref 10–20)
CALCIUM BLD-MCNC: 9.3 MG/DL (ref 8.5–10.1)
CHLORIDE SERPL-SCNC: 106 MMOL/L (ref 98–112)
CHOLEST SMN-MCNC: 148 MG/DL (ref ?–200)
CO2 SERPL-SCNC: 23 MMOL/L (ref 21–32)
COMPLEXED PSA SERPL-MCNC: 0.99 NG/ML (ref ?–4)
CREAT BLD-MCNC: 0.89 MG/DL (ref 0.7–1.3)
DEPRECATED RDW RBC AUTO: 42 FL (ref 35.1–46.3)
EOSINOPHIL # BLD AUTO: 0.19 X10(3) UL (ref 0–0.7)
EOSINOPHIL NFR BLD AUTO: 3.6 %
ERYTHROCYTE [DISTWIDTH] IN BLOOD BY AUTOMATED COUNT: 12.1 % (ref 11–15)
GLOBULIN PLAS-MCNC: 3.1 G/DL (ref 2.8–4.4)
GLUCOSE (URINE DIPSTICK): NEGATIVE MG/DL
GLUCOSE BLD-MCNC: 94 MG/DL (ref 70–99)
HCT VFR BLD AUTO: 44.4 % (ref 39–53)
HDLC SERPL-MCNC: 49 MG/DL (ref 40–59)
HGB BLD-MCNC: 14.6 G/DL (ref 13–17.5)
IMM GRANULOCYTES # BLD AUTO: 0.05 X10(3) UL (ref 0–1)
IMM GRANULOCYTES NFR BLD: 0.9 %
KETONES (URINE DIPSTICK): NEGATIVE MG/DL
LDLC SERPL CALC-MCNC: 63 MG/DL (ref ?–100)
LEUKOCYTES: NEGATIVE
LYMPHOCYTES # BLD AUTO: 1.25 X10(3) UL (ref 1–4)
LYMPHOCYTES NFR BLD AUTO: 23.6 %
M PROTEIN MFR SERPL ELPH: 7.3 G/DL (ref 6.4–8.2)
MCH RBC QN AUTO: 30.8 PG (ref 26–34)
MCHC RBC AUTO-ENTMCNC: 32.9 G/DL (ref 31–37)
MCV RBC AUTO: 93.7 FL (ref 80–100)
MONOCYTES # BLD AUTO: 0.43 X10(3) UL (ref 0.1–1)
MONOCYTES NFR BLD AUTO: 8.1 %
MULTISTIX LOT#: NORMAL NUMERIC
NEUTROPHILS # BLD AUTO: 3.33 X10 (3) UL (ref 1.5–7.7)
NEUTROPHILS # BLD AUTO: 3.33 X10(3) UL (ref 1.5–7.7)
NEUTROPHILS NFR BLD AUTO: 63 %
NITRITE, URINE: NEGATIVE
NONHDLC SERPL-MCNC: 99 MG/DL (ref ?–130)
OCCULT BLOOD: NEGATIVE
OSMOLALITY SERPL CALC.SUM OF ELEC: 286 MOSM/KG (ref 275–295)
PH, URINE: 6.5 (ref 4.5–8)
PLATELET # BLD AUTO: 212 10(3)UL (ref 150–450)
POTASSIUM SERPL-SCNC: 4.3 MMOL/L (ref 3.5–5.1)
PROTEIN (URINE DIPSTICK): NEGATIVE MG/DL
RBC # BLD AUTO: 4.74 X10(6)UL (ref 4.3–5.7)
SODIUM SERPL-SCNC: 138 MMOL/L (ref 136–145)
SPECIFIC GRAVITY: 1.01 (ref 1–1.03)
TRIGL SERPL-MCNC: 182 MG/DL (ref 30–149)
TSI SER-ACNC: 0.75 MIU/ML (ref 0.36–3.74)
URINE-COLOR: YELLOW
UROBILINOGEN,SEMI-QN: 0.2 MG/DL (ref 0–1.9)
VIT D+METAB SERPL-MCNC: 34.7 NG/ML (ref 30–100)
VLDLC SERPL CALC-MCNC: 36 MG/DL (ref 0–30)
WBC # BLD AUTO: 5.3 X10(3) UL (ref 4–11)

## 2019-08-19 PROCEDURE — 84153 ASSAY OF PSA TOTAL: CPT | Performed by: FAMILY MEDICINE

## 2019-08-19 PROCEDURE — 99396 PREV VISIT EST AGE 40-64: CPT | Performed by: FAMILY MEDICINE

## 2019-08-19 PROCEDURE — 80050 GENERAL HEALTH PANEL: CPT | Performed by: FAMILY MEDICINE

## 2019-08-19 PROCEDURE — 99214 OFFICE O/P EST MOD 30 MIN: CPT | Performed by: FAMILY MEDICINE

## 2019-08-19 PROCEDURE — 36415 COLL VENOUS BLD VENIPUNCTURE: CPT | Performed by: FAMILY MEDICINE

## 2019-08-19 PROCEDURE — 82306 VITAMIN D 25 HYDROXY: CPT | Performed by: NURSE PRACTITIONER

## 2019-08-19 PROCEDURE — 81003 URINALYSIS AUTO W/O SCOPE: CPT | Performed by: FAMILY MEDICINE

## 2019-08-19 PROCEDURE — 80061 LIPID PANEL: CPT | Performed by: FAMILY MEDICINE

## 2019-08-19 RX ORDER — ALPRAZOLAM 0.5 MG/1
TABLET ORAL
Qty: 20 TABLET | Refills: 0 | Status: SHIPPED
Start: 2019-08-19 | End: 2019-09-10

## 2019-08-19 NOTE — TELEPHONE ENCOUNTER
Medication(s) to Refill:   Requested Prescriptions     Pending Prescriptions Disp Refills   • ALPRAZOLAM 0.5 MG Oral Tab [Pharmacy Med Name: ALPRAZOLAM 0.5MG    TAB] 20 tablet 0     Sig: TAKE 1 TABLET BY MOUTH TWICE DAILY AS NEEDED FOR ANXIETY         Reas

## 2019-08-19 NOTE — PROGRESS NOTES
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  Patient has a history of coronary artery disease currently under control taking beta-blocker as well as atorvastatin to diagonal, OM and PDA   • CARDIAC CATH LAB      On 8/14/2013: cardiac cath showed 3-vessel disease   • HEART CORONARY ARTERY BYPASS GRAFT N/A 8/16/2013    Performed by Raissa Carter MD at [address] [facility] History:  Social History    Tobacco joint pain, gait problem, neck pain and neck stiffness. Skin: Negative for color change, pallor, rash and wound. Allergic/Immunologic: Negative for environmental allergies, food allergies and immunocompromised state.    Neurological: Negative for dizzin cervical adenopathy. Neurological: He is alert and oriented to person, place, and time. No cranial nerve deficit or motor deficit. Gait normal.   Skin: Skin is warm and dry. No lesion and no rash noted. He is not diaphoretic.    Psychiatric: He has a norm

## 2019-08-21 ENCOUNTER — TELEPHONE (OUTPATIENT)
Dept: FAMILY MEDICINE CLINIC | Facility: CLINIC | Age: 49
End: 2019-08-21

## 2019-08-21 NOTE — TELEPHONE ENCOUNTER
Patient would like to speak with a nurse regarding his test results, he has some questions. Thank you.

## 2019-08-21 NOTE — TELEPHONE ENCOUNTER
Called patient and spoke with him. Went over results and POC below. Patient states understanding. All questions answered for patient and patient expressed understanding.

## 2019-09-10 RX ORDER — ALPRAZOLAM 0.5 MG/1
0.5 TABLET ORAL 2 TIMES DAILY PRN
Qty: 20 TABLET | Refills: 0 | Status: SHIPPED | OUTPATIENT
Start: 2019-09-10 | End: 2019-10-30

## 2019-09-10 NOTE — TELEPHONE ENCOUNTER
Medication(s) to Refill:   Requested Prescriptions     Pending Prescriptions Disp Refills   • ALPRAZolam 0.5 MG Oral Tab 20 tablet 0         Reason for Medication Refill being sent to Provider / Reason Protocol Failed:    [x] Non-Protocol Medication  [x] P

## 2019-10-13 DIAGNOSIS — I10 ESSENTIAL HYPERTENSION: ICD-10-CM

## 2019-10-14 ENCOUNTER — HOSPITAL ENCOUNTER (OUTPATIENT)
Age: 49
Discharge: HOME | End: 2019-10-14

## 2019-10-14 DIAGNOSIS — I10 ESSENTIAL HYPERTENSION: ICD-10-CM

## 2019-10-14 PROCEDURE — 90686 IIV4 VACC NO PRSV 0.5 ML IM: CPT

## 2019-10-14 PROCEDURE — 90471 IMMUNIZATION ADMIN: CPT

## 2019-10-14 RX ORDER — METOPROLOL SUCCINATE 50 MG/1
50 TABLET, EXTENDED RELEASE ORAL DAILY
Qty: 90 TABLET | Refills: 2 | Status: SHIPPED | OUTPATIENT
Start: 2019-10-14 | End: 2020-07-06

## 2019-10-14 RX ORDER — ATORVASTATIN CALCIUM 20 MG/1
TABLET, FILM COATED ORAL
Qty: 90 TABLET | Refills: 2 | Status: SHIPPED | OUTPATIENT
Start: 2019-10-14 | End: 2020-07-13

## 2019-10-14 RX ORDER — METOPROLOL SUCCINATE 50 MG/1
50 TABLET, EXTENDED RELEASE ORAL DAILY
Qty: 90 TABLET | Refills: 0 | OUTPATIENT
Start: 2019-10-14

## 2019-10-14 RX ORDER — ATORVASTATIN CALCIUM 20 MG/1
TABLET, FILM COATED ORAL
Qty: 90 TABLET | Refills: 0 | OUTPATIENT
Start: 2019-10-14

## 2019-10-30 RX ORDER — ALPRAZOLAM 0.5 MG/1
0.5 TABLET ORAL 2 TIMES DAILY PRN
Qty: 20 TABLET | Refills: 0 | Status: SHIPPED | OUTPATIENT
Start: 2019-10-30 | End: 2019-12-29

## 2019-10-30 NOTE — TELEPHONE ENCOUNTER
Medication(s) to Refill:   Requested Prescriptions     Pending Prescriptions Disp Refills   • ALPRAZolam 0.5 MG Oral Tab 20 tablet 0     Sig: Take 1 tablet (0.5 mg total) by mouth 2 (two) times daily as needed.          Reason for Medication Refill being se

## 2019-12-29 ENCOUNTER — TELEPHONE (OUTPATIENT)
Dept: FAMILY MEDICINE CLINIC | Facility: CLINIC | Age: 49
End: 2019-12-29

## 2019-12-30 RX ORDER — ALPRAZOLAM 0.5 MG/1
0.5 TABLET ORAL 2 TIMES DAILY PRN
Qty: 20 TABLET | Refills: 0 | Status: SHIPPED | OUTPATIENT
Start: 2019-12-30 | End: 2020-01-27

## 2019-12-30 RX ORDER — ALPRAZOLAM 0.5 MG/1
0.5 TABLET ORAL 2 TIMES DAILY PRN
Qty: 20 TABLET | Refills: 0 | OUTPATIENT
Start: 2019-12-30

## 2019-12-30 NOTE — TELEPHONE ENCOUNTER
Patient called back wanting to know why this was denied. I did schedule him for a follow up today 12/30 with PA.

## 2019-12-30 NOTE — TELEPHONE ENCOUNTER
See other refill request from today. Duplicate request.  Rx was already sent to pharmacy for patient.

## 2019-12-30 NOTE — PATIENT INSTRUCTIONS
Your Body’s Response to Anxiety    Normal anxiety is part of the body’s natural defense system.  It's an alert to a threat that is unknown, vague, or comes from your own internal fears. While you’re in this state, your feelings can range from a vague sens Some people are more prone to persistent anxiety than others. It tends to run in families, and it affects more younger people than older people, and more women than men. But no age, race, or gender is immune to anxiety problems.   Anxiety can be treated  Th © 2304-8240 [employer] [address]. [address] [facility] [place], [facility] [place]. All rights reserved. This information is not intended as a substitute for professional medical care. Always follow your healthcare professional's instructions.         Kallie · painful or prolonged erections  · restlessness, pacing, inability to keep still  · seizures  · stiff muscles  · suicidal thoughts or other mood changes  · trouble sleeping  · unusual bleeding or bruising  · unusually weak or tired  · vomiting  Side effec If you miss a dose, take it as soon as you can. If it is almost time for your next dose, take only that dose. Do not take double or extra doses. Where should I keep my medicine? Keep out of the reach of children.   Store at room temperature between 15 and You may get drowsy or dizzy. Do not drive, use machinery, or do anything that needs mental alertness until you know how this medicine affects you. Do not stand or sit up quickly, especially if you are an older patient.  This reduces the risk of dizzy or gina

## 2020-01-20 DIAGNOSIS — F41.9 ANXIETY: ICD-10-CM

## 2020-01-20 RX ORDER — SERTRALINE HYDROCHLORIDE 25 MG/1
25 TABLET, FILM COATED ORAL DAILY
Qty: 30 TABLET | Refills: 0 | Status: SHIPPED | OUTPATIENT
Start: 2020-01-20 | End: 2020-01-27

## 2020-01-20 NOTE — TELEPHONE ENCOUNTER
Medication(s) to Refill:   Requested Prescriptions     Pending Prescriptions Disp Refills   • Sertraline HCl 25 MG Oral Tab 30 tablet 0     Sig: Take 1 tablet (25 mg total) by mouth daily.          Reason for Medication Refill being sent to Provider / Pietro Parmar

## 2020-01-20 NOTE — TELEPHONE ENCOUNTER
Medication(s) to Refill:   Requested Prescriptions     Pending Prescriptions Disp Refills   • Sertraline HCl 25 MG Oral Tab 30 tablet 0     Sig: Take 1 tablet (25 mg total) by mouth daily.          Reason for Medication Refill being sent to Provider / Bubba Villarreal

## 2020-01-21 DIAGNOSIS — F41.9 ANXIETY: ICD-10-CM

## 2020-01-21 RX ORDER — SERTRALINE HYDROCHLORIDE 25 MG/1
TABLET, FILM COATED ORAL
Qty: 30 TABLET | Refills: 0 | OUTPATIENT
Start: 2020-01-21

## 2020-01-23 RX ORDER — SERTRALINE HYDROCHLORIDE 25 MG/1
25 TABLET, FILM COATED ORAL DAILY
Qty: 30 TABLET | Refills: 0 | Status: SHIPPED | OUTPATIENT
Start: 2020-01-23 | End: 2020-01-27 | Stop reason: ALTCHOICE

## 2020-01-23 NOTE — TELEPHONE ENCOUNTER
Left msg for pt that he needs to schedule OV prior to the 30 day refill running out and once seen the refills will be extended

## 2020-01-27 PROBLEM — F41.1 GAD (GENERALIZED ANXIETY DISORDER): Status: ACTIVE | Noted: 2020-01-27

## 2020-01-27 NOTE — PROGRESS NOTES
Chief Complaint:   Patient presents with:  Medication Follow-Up    HPI:   This is a 47year old male presenting for follow up on BRANDAN with acute anxiety. Patient's mood's stable with ssri and BZ-he reports no new complaints.    Patient's blood pressure i status: Former Smoker        Packs/day: 1.00        Years: 27.00        Pack years: 27        Types: Cigarettes        Quit date: 8/15/2013        Years since quittin.4      Smokeless tobacco: Never Used    Alcohol use: No    Drug use: No    Family His food allergies and immunocompromised state. Neurological: Negative for dizziness, weakness, light-headedness and headaches. Hematological: Negative for adenopathy. Does not bruise/bleed easily.    Psychiatric/Behavioral: Negative for suicidal ideas, beh is warm and dry. No lesion and no rash noted. He is not diaphoretic. Psychiatric: He has a normal mood and affect. His behavior is normal. Judgment and thought content normal.        ASSESSMENT AND PLAN:   Diagnoses and all orders for this visit:    1.  A

## 2020-03-03 ENCOUNTER — OFFICE VISIT (OUTPATIENT)
Dept: FAMILY MEDICINE CLINIC | Facility: CLINIC | Age: 50
End: 2020-03-03
Payer: COMMERCIAL

## 2020-03-03 VITALS
HEIGHT: 74 IN | DIASTOLIC BLOOD PRESSURE: 88 MMHG | HEART RATE: 68 BPM | OXYGEN SATURATION: 97 % | RESPIRATION RATE: 22 BRPM | SYSTOLIC BLOOD PRESSURE: 138 MMHG | TEMPERATURE: 99 F | BODY MASS INDEX: 37.73 KG/M2 | WEIGHT: 294 LBS

## 2020-03-03 DIAGNOSIS — J06.9 VIRAL URI: ICD-10-CM

## 2020-03-03 DIAGNOSIS — M54.2 NECK PAIN: Primary | ICD-10-CM

## 2020-03-03 PROCEDURE — 99213 OFFICE O/P EST LOW 20 MIN: CPT | Performed by: NURSE PRACTITIONER

## 2020-03-03 RX ORDER — NAPROXEN 500 MG/1
500 TABLET ORAL 2 TIMES DAILY WITH MEALS
Qty: 28 TABLET | Refills: 0 | Status: SHIPPED | OUTPATIENT
Start: 2020-03-03 | End: 2020-03-17

## 2020-03-04 NOTE — PROGRESS NOTES
CHIEF COMPLAINT:   Patient presents with:  Sore Throat: X Just started now  Neck Pain: Neck stiffness, mainly on right side, X Started lastnight   Note For Work: Today       HPI:   Alexandrasebastián Cooley. is a 47year old male who presents for right sided neck Past Surgical History:   Procedure Laterality Date   • APPENDECTOMY     • CABG      On 8/16/13: CABG x 4 with LIMA to LAD and SVG to diagonal, OM and PDA   • CARDIAC CATH LAB      On 8/14/2013: cardiac cath showed 3-vessel disease   • HEART CORONARY ARTERY Hillary Ford. is a 47year old male who presents with upper respiratory symptoms that are consistent with    ASSESSMENT:   Neck pain  (primary encounter diagnosis)  Viral uri    PLAN:  Neck pain: muscular in nature.  Advised heat and gentle stretching Acute neck pain usually gets better in 1 to 2 weeks. Neck pain related to disk disease, arthritis in the spinal joints, or spinal stenosis can become chronic and last for months or years.  Spinal stenosis is narrowing of the spinal canal.  X-rays are usuall Follow up with your healthcare provider if your symptoms do not show signs of improvement after one week. Physical therapy or further tests may be needed.   If X-rays, CT scans, or MRI scans were taken, you will be told of any new findings that may affect y · You may use acetaminophen or ibuprofen to control pain and fever, unless another medicine was prescribed. If you have chronic liver or kidney disease, have ever had a stomach ulcer or gastrointestinal bleeding, or are taking blood-thinning medicines, jenni © 4939-4082 [employer]. [address] [facility] [place] [place], [address] [place]. All rights reserved. This information is not intended as a substitute for professional medical care. Always follow your healthcare professional's instructions.             The

## 2020-03-04 NOTE — PATIENT INSTRUCTIONS
Apply heat to the area for 10-15 minutes 2x daily. Take naproxen as prescribed on a full stomach for the next 3-4 days. Follow up for any new or worsening symptoms.      Neck Pain  There are several possible causes of neck pain when there is no injury:  · · Some people find relief with heat. Heat can be applied with either a warm shower or bath or a moist towel heated in the microwave and massage. Others prefer cold packs.  You can make an ice pack by filling a plastic bag that seals at the top with ice cube © 8573-8804 [employer]. [address] [facility] [place], [address] [place]. All rights reserved. This information is not intended as a substitute for professional medical care. Always follow your healthcare professional's instructions.         Viral U · Over-the-counter cold medicines will not shorten the length of time you’re sick, but they may be helpful for the following symptoms: cough, sore throat, and nasal and sinus congestion.  If you take prescription medicines, ask your healthcare provider or p

## 2020-04-03 ENCOUNTER — VIRTUAL PHONE E/M (OUTPATIENT)
Dept: FAMILY MEDICINE CLINIC | Facility: CLINIC | Age: 50
End: 2020-04-03
Payer: COMMERCIAL

## 2020-04-03 ENCOUNTER — TELEPHONE (OUTPATIENT)
Dept: FAMILY MEDICINE CLINIC | Facility: CLINIC | Age: 50
End: 2020-04-03

## 2020-04-03 DIAGNOSIS — J00 ACUTE NASOPHARYNGITIS: Primary | ICD-10-CM

## 2020-04-03 PROCEDURE — 99213 OFFICE O/P EST LOW 20 MIN: CPT | Performed by: FAMILY MEDICINE

## 2020-04-03 NOTE — PROGRESS NOTES
Virtual/Telephone Check-In    Alexandr See. verbally consents to a Virtual/Telephone Check-In service on 04/03/20. Patient understands and accepts financial responsibility for any deductible, co-insurance and/or co-pays associated with this service.

## 2020-04-03 NOTE — TELEPHONE ENCOUNTER
Pt works at [employer]. There were 4 staff members that tested positive to covid 23. Pt has cough, scratchy throat and a runny nose. No fever. Pt is asking what he should do? It's mandatory that he reports to work.

## 2020-06-23 NOTE — TELEPHONE ENCOUNTER
----- Message from Irma Cam PA-C sent at 7/26/2017  6:44 PM CDT -----  Low calcium-increase dietary calcium. ALT elevated.  Repeat CMP in one month ----- Message from Lisa MARKHAM 

## 2020-07-06 DIAGNOSIS — F41.9 ANXIETY: ICD-10-CM

## 2020-07-06 DIAGNOSIS — I10 ESSENTIAL HYPERTENSION: ICD-10-CM

## 2020-07-06 RX ORDER — METOPROLOL SUCCINATE 50 MG/1
TABLET, EXTENDED RELEASE ORAL
Qty: 90 TABLET | Refills: 1 | Status: SHIPPED | OUTPATIENT
Start: 2020-07-06 | End: 2021-01-11

## 2020-07-13 RX ORDER — ATORVASTATIN CALCIUM 20 MG/1
TABLET, FILM COATED ORAL
Qty: 90 TABLET | Refills: 2 | Status: SHIPPED | OUTPATIENT
Start: 2020-07-13 | End: 2021-04-12

## 2020-09-18 ENCOUNTER — IMMUNIZATION (OUTPATIENT)
Dept: FAMILY MEDICINE CLINIC | Facility: CLINIC | Age: 50
End: 2020-09-18
Payer: COMMERCIAL

## 2020-09-18 DIAGNOSIS — Z23 NEED FOR VACCINATION: ICD-10-CM

## 2020-09-18 PROCEDURE — 90471 IMMUNIZATION ADMIN: CPT | Performed by: FAMILY MEDICINE

## 2020-09-18 PROCEDURE — 90686 IIV4 VACC NO PRSV 0.5 ML IM: CPT | Performed by: FAMILY MEDICINE

## 2020-09-21 ENCOUNTER — MED REC SCAN ONLY (OUTPATIENT)
Dept: FAMILY MEDICINE CLINIC | Facility: CLINIC | Age: 50
End: 2020-09-21

## 2020-10-27 ENCOUNTER — OFFICE VISIT (OUTPATIENT)
Dept: FAMILY MEDICINE CLINIC | Facility: CLINIC | Age: 50
End: 2020-10-27
Payer: COMMERCIAL

## 2020-10-27 VITALS
WEIGHT: 287.63 LBS | TEMPERATURE: 98 F | BODY MASS INDEX: 36.91 KG/M2 | SYSTOLIC BLOOD PRESSURE: 130 MMHG | HEIGHT: 74 IN | HEART RATE: 74 BPM | RESPIRATION RATE: 18 BRPM | DIASTOLIC BLOOD PRESSURE: 86 MMHG | OXYGEN SATURATION: 99 %

## 2020-10-27 DIAGNOSIS — B34.9 VIRAL ILLNESS: ICD-10-CM

## 2020-10-27 DIAGNOSIS — Z20.822 EXPOSURE TO COVID-19 VIRUS: Primary | ICD-10-CM

## 2020-10-27 PROCEDURE — U0003 INFECTIOUS AGENT DETECTION BY NUCLEIC ACID (DNA OR RNA); SEVERE ACUTE RESPIRATORY SYNDROME CORONAVIRUS 2 (SARS-COV-2) (CORONAVIRUS DISEASE [COVID-19]), AMPLIFIED PROBE TECHNIQUE, MAKING USE OF HIGH THROUGHPUT TECHNOLOGIES AS DESCRIBED BY CMS-2020-01-R: HCPCS | Performed by: NURSE PRACTITIONER

## 2020-10-27 PROCEDURE — 99213 OFFICE O/P EST LOW 20 MIN: CPT | Performed by: NURSE PRACTITIONER

## 2020-10-27 PROCEDURE — 3075F SYST BP GE 130 - 139MM HG: CPT | Performed by: NURSE PRACTITIONER

## 2020-10-27 PROCEDURE — 3008F BODY MASS INDEX DOCD: CPT | Performed by: NURSE PRACTITIONER

## 2020-10-27 PROCEDURE — 3079F DIAST BP 80-89 MM HG: CPT | Performed by: NURSE PRACTITIONER

## 2020-10-27 NOTE — PROGRESS NOTES
CHIEF COMPLAINT:   Patient presents with:  Covid: fever cough sore throat body achesrunny nose and diarrhea x sunday       HPI:   Sandoval Irizarry is a 45year old male who presents for upper respiratory symptoms for  3 days.  Patient reports sore throat • CARDIAC CATH LAB      On 8/14/2013: cardiac cath showed 3-vessel disease   • HEART CORONARY ARTERY BYPASS GRAFT N/A 8/16/2013    Performed by Guillermo De La Cruz MD at [address] [place] [address] History    Tobacco Use      Smoking status: Former Smoker PLAN: Comfort care as described in Patient Instructions. Quarantine instructions given. Advised to remain off of work until [employer] are back.   COVID swab sent    Meds & Refills for this Visit:  Requested Prescriptions      No prescriptions requeste For the latest information, visit the CDC website at www.cdc.gov/coronavirus/2019-ncov. Or call 067-EDN-CABP ([phone]). What are the symptoms of COVID-19? Some people have no symptoms or mild symptoms. Symptoms can also vary from person to person. As experts learn more about UBFHR-82, other complications are being reported that may be linked to COVID-19. Rarely, some children have developed severe complications called multisystem inflammatory syndrome in children (MIS-C).  MIS-C seems to be similar t · Antibody blood test.  Antibody tests are being looked at to find out if a person has previously been infected with the virus and may now have antibodies such as SARS AB IgG in their blood to give some immunity.  The accuracy and availability of antibody t · Oxygen. You may be given supplemental oxygen or ventilation with a breathing machine (ventilator). This is done so you get enough oxygen in your body. · Prone positioning.   Depending on how sick you are during your hospital stay, your healthcare team ma © 3436-1679 [employer] [address]. [address] [facility] [place], [facility] [place]. All rights reserved. This information is not intended as a substitute for professional medical care. Always follow your healthcare professional's instructions.

## 2020-10-27 NOTE — PATIENT INSTRUCTIONS
Remain in quarantine for the next 3 days until COVID results are back. Coronavirus Disease 2019 (COVID-19): Overview  Coronavirus disease 2019 (COVID-19) is a respiratory illness. It's caused by a new (novel) coronavirus.  There are many types of cor · Loss of sense of smell and taste  You can check your symptoms with the CDC’s Coronavirus Self-. What are possible complications from WAKLB-97? In many cases, this virus can cause infection (pneumonia) in both lungs.  In some cases, this can caus Your healthcare provider will ask about your symptoms. He or she will ask where you live, and about your recent travel, and any contact with sick people.  If your healthcare provider thinks you may have COVID-19, he or she will consider whether to test you The most proven treatments right now are those to help your body while it fights the virus. This is known as supportive care. Supportive care may include:   · Getting rest.  This helps your body fight the illness. · Staying hydrated.   Drinking liquids is People who have had COVID-19 and are fully recovered may be asked by their healthcare team to consider donating plasma. This is called COVID-19 convalescent plasma donation.  Plasma from people fully recovered from COVID-19 may contain antibodies to help fi

## 2020-11-16 ENCOUNTER — OFFICE VISIT (OUTPATIENT)
Dept: FAMILY MEDICINE CLINIC | Facility: CLINIC | Age: 50
End: 2020-11-16
Payer: COMMERCIAL

## 2020-11-16 VITALS
OXYGEN SATURATION: 98 % | HEART RATE: 60 BPM | RESPIRATION RATE: 16 BRPM | SYSTOLIC BLOOD PRESSURE: 130 MMHG | HEIGHT: 74 IN | TEMPERATURE: 97 F | WEIGHT: 288 LBS | BODY MASS INDEX: 36.96 KG/M2 | DIASTOLIC BLOOD PRESSURE: 80 MMHG

## 2020-11-16 DIAGNOSIS — Z00.00 ROUTINE GENERAL MEDICAL EXAMINATION AT A HEALTH CARE FACILITY: Primary | ICD-10-CM

## 2020-11-16 DIAGNOSIS — Z12.5 SCREENING FOR PROSTATE CANCER: ICD-10-CM

## 2020-11-16 DIAGNOSIS — M25.511 ACUTE PAIN OF RIGHT SHOULDER: ICD-10-CM

## 2020-11-16 DIAGNOSIS — Z13.21 ENCOUNTER FOR VITAMIN DEFICIENCY SCREENING: ICD-10-CM

## 2020-11-16 DIAGNOSIS — Z00.00 LABORATORY EXAM ORDERED AS PART OF ROUTINE GENERAL MEDICAL EXAMINATION: ICD-10-CM

## 2020-11-16 PROCEDURE — 3008F BODY MASS INDEX DOCD: CPT | Performed by: NURSE PRACTITIONER

## 2020-11-16 PROCEDURE — 3075F SYST BP GE 130 - 139MM HG: CPT | Performed by: NURSE PRACTITIONER

## 2020-11-16 PROCEDURE — 99396 PREV VISIT EST AGE 40-64: CPT | Performed by: NURSE PRACTITIONER

## 2020-11-16 PROCEDURE — 99072 ADDL SUPL MATRL&STAF TM PHE: CPT | Performed by: NURSE PRACTITIONER

## 2020-11-16 PROCEDURE — 3079F DIAST BP 80-89 MM HG: CPT | Performed by: NURSE PRACTITIONER

## 2020-11-16 RX ORDER — METHYLPREDNISOLONE 4 MG/1
TABLET ORAL
Qty: 1 KIT | Refills: 0 | Status: SHIPPED | OUTPATIENT
Start: 2020-11-16 | End: 2020-12-28 | Stop reason: ALTCHOICE

## 2020-11-16 RX ORDER — DICLOFENAC SODIUM 75 MG/1
75 TABLET, DELAYED RELEASE ORAL 2 TIMES DAILY
Qty: 60 TABLET | Refills: 0 | Status: SHIPPED | OUTPATIENT
Start: 2020-11-16 | End: 2020-12-16

## 2020-11-16 NOTE — PROGRESS NOTES
Patient presents with:  Wellness Visit  Shoulder Pain: right shoulder injury, pulling        HPI:   Patient is 45 y male presents for comprehensive physical. Patient has concerns for R shoulder pain .  Reports  has been hurting for 2 months  Dull,throbbing cath showed 3-vessel disease   • HEART CORONARY ARTERY BYPASS GRAFT N/A 8/16/2013    Performed by Corey Carson MD at [address] [place] [address] History:   Social History    Tobacco Use      Smoking status: Former Smoker        Packs/day: 1.00        Y VITAMIN D, 25-HYDROXY; Future    Screening for prostate cancer  -     PSA SCREEN; Future    Acute pain of right shoulder  -     methylPREDNISolone (MEDROL) 4 MG Oral Tablet Therapy Pack; As directed. -     Diclofenac Sodium 75 MG Oral Tab EC;  Take 1 table

## 2020-11-20 ENCOUNTER — HOSPITAL ENCOUNTER (OUTPATIENT)
Dept: MRI IMAGING | Age: 50
Discharge: HOME | End: 2020-11-20
Attending: NURSE PRACTITIONER
Payer: OTHER MISCELLANEOUS

## 2020-11-20 DIAGNOSIS — M25.511 ACUTE PAIN OF RIGHT SHOULDER: ICD-10-CM

## 2020-11-20 PROCEDURE — 73221 MRI JOINT UPR EXTREM W/O DYE: CPT | Performed by: NURSE PRACTITIONER

## 2020-11-23 ENCOUNTER — TELEPHONE (OUTPATIENT)
Dept: FAMILY MEDICINE CLINIC | Facility: CLINIC | Age: 50
End: 2020-11-23

## 2020-11-23 DIAGNOSIS — M25.511 ACUTE PAIN OF RIGHT SHOULDER: Primary | ICD-10-CM

## 2020-11-23 PROBLEM — S46.011A STRAIN OF RIGHT ROTATOR CUFF CAPSULE: Status: ACTIVE | Noted: 2020-11-23

## 2020-11-24 ENCOUNTER — TELEPHONE (OUTPATIENT)
Dept: PHYSICAL THERAPY | Age: 50
End: 2020-11-24

## 2020-11-25 ENCOUNTER — TELEPHONE (OUTPATIENT)
Dept: FAMILY MEDICINE CLINIC | Facility: CLINIC | Age: 50
End: 2020-11-25

## 2020-11-25 ENCOUNTER — OFFICE VISIT (OUTPATIENT)
Dept: PHYSICAL THERAPY | Age: 50
End: 2020-11-25
Attending: ORTHOPAEDIC SURGERY
Payer: OTHER MISCELLANEOUS

## 2020-11-25 DIAGNOSIS — S46.011A STRAIN OF RIGHT ROTATOR CUFF CAPSULE, INITIAL ENCOUNTER: ICD-10-CM

## 2020-11-25 PROCEDURE — 97110 THERAPEUTIC EXERCISES: CPT

## 2020-11-25 PROCEDURE — 97162 PT EVAL MOD COMPLEX 30 MIN: CPT

## 2020-11-25 NOTE — PROGRESS NOTES
UPPER EXTREMITY EVALUATION:   Referring Physician: Dr. Erika Adame  Diagnosis: Right Posterior Shoulder Strain     Date of Service: 11/25/2020     PATIENT SUMMARY   Enid Stevens is a 45year old y/o right hand dominant male who presents to therapy t testing and MMT. Danuta Guo would benefit from skilled Physical Therapy to address the above impairments to increase mobility to tolerance of the right upper extremity with the intention to return the patient to work and painting his home.       Precautions: and Response:  Patient education provided on 11/25/2020 regarding the examination findings, stage of condition, and subjective/tissue reactivity.    Patient was instructed in and issued a HEP for AAROM of the right upper extremity, which the patient demonst planning and for this course of care. Thank you for your referral. Please co-sign or sign and return this letter via fax as soon as possible to [phone].  If you have any questions, please contact me at Dept: [phone]    Sincerely,  Electronicall

## 2020-11-25 NOTE — TELEPHONE ENCOUNTER
Patient does not want to see Dr. Jeanna Joiner, he would like to go to Dr. Carolina Martin instead, he is asking for a refferal to reflect that. Please Advise.     Thank you

## 2020-11-30 ENCOUNTER — OFFICE VISIT (OUTPATIENT)
Dept: PHYSICAL THERAPY | Age: 50
End: 2020-11-30
Attending: ORTHOPAEDIC SURGERY
Payer: OTHER MISCELLANEOUS

## 2020-11-30 PROCEDURE — 97140 MANUAL THERAPY 1/> REGIONS: CPT

## 2020-11-30 PROCEDURE — 97110 THERAPEUTIC EXERCISES: CPT

## 2020-11-30 NOTE — PROGRESS NOTES
Dx: Right Shoulder Strain         Authorized # of Visits:  12 (PPO)         Next MD visit: none scheduled  Fall Risk: standard         Precautions: n/a             Subjective: States that the shoulder pain was high all weekend and he is seeing his doctor l pec major/minor in order to return to improved sitting posture as required for his job as a [employer] . Plan: Assess response and progress as tolerated. Charges: TherEx 2 (25 min);  Manual PT (15 min)       Total Timed Treatment: 40 min  Tot

## 2020-12-02 ENCOUNTER — OFFICE VISIT (OUTPATIENT)
Dept: PHYSICAL THERAPY | Age: 50
End: 2020-12-02
Attending: ORTHOPAEDIC SURGERY
Payer: OTHER MISCELLANEOUS

## 2020-12-02 PROCEDURE — 97140 MANUAL THERAPY 1/> REGIONS: CPT

## 2020-12-02 PROCEDURE — 97110 THERAPEUTIC EXERCISES: CPT

## 2020-12-02 NOTE — PROGRESS NOTES
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     4. Patient will have an increase in strength for the right upper extremity to 5/5 to assist with returning to all work related activ

## 2020-12-03 ENCOUNTER — TELEPHONE (OUTPATIENT)
Dept: FAMILY MEDICINE CLINIC | Facility: CLINIC | Age: 50
End: 2020-12-03

## 2020-12-03 NOTE — TELEPHONE ENCOUNTER
Pt saw Dr. Cortney Puri, does not need surgery and was told to continue with conservative treatment. Pt is also working with PT but still has right shoulder pain. Please advise. Thank you.   LOVE:[id number]/52/4563

## 2020-12-03 NOTE — TELEPHONE ENCOUNTER
New or ongoing issue: ongoing    Brief description of symptoms: Pt still has a right shoulder pain, he saw a specialist, is doing PT but nothing is helping.  Pt is looking for advise    Approximately how long? :     Offered appointment:     Appointment sche

## 2020-12-04 NOTE — TELEPHONE ENCOUNTER
See dr. Layla Dancer note-he recommend mobic vs. voltaren and do organized PT. Have him continue with shoulder specialist's recommendation.

## 2020-12-04 NOTE — TELEPHONE ENCOUNTER
Called pt and informed him of Dr. Michael Agosto response below. No questions at this time. Pt verbalized understanding.

## 2020-12-07 ENCOUNTER — OFFICE VISIT (OUTPATIENT)
Dept: PHYSICAL THERAPY | Age: 50
End: 2020-12-07
Attending: ORTHOPAEDIC SURGERY
Payer: OTHER MISCELLANEOUS

## 2020-12-07 PROCEDURE — 97110 THERAPEUTIC EXERCISES: CPT

## 2020-12-07 PROCEDURE — 97140 MANUAL THERAPY 1/> REGIONS: CPT

## 2020-12-07 NOTE — PROGRESS NOTES
Progress Summary  Pt has attended 4 visits in Physical Therapy.    Dx: Right Shoulder Strain         Authorized # of Visits:  12 (PPO)         Next MD visit: 12/10/2020  Fall Risk: standard         Precautions: n/a             Subjective: States that the s all planes of motion at the shoulder  PROM for all planes of motion at the shoulder  PROM for all planes of motion at the shoulder          Assessment: Overall, the patient continues to present with signs and symptoms consistent with a mild posterior shoul TherEx 2 (25 min);  Manual PT (15 min)       Total Timed Treatment: 40 min  Total Treatment Time: 40 min

## 2020-12-10 ENCOUNTER — TELEPHONE (OUTPATIENT)
Dept: PHYSICAL THERAPY | Facility: HOSPITAL | Age: 50
End: 2020-12-10

## 2020-12-11 ENCOUNTER — APPOINTMENT (OUTPATIENT)
Dept: PHYSICAL THERAPY | Age: 50
End: 2020-12-11
Attending: ORTHOPAEDIC SURGERY
Payer: OTHER MISCELLANEOUS

## 2020-12-14 ENCOUNTER — OFFICE VISIT (OUTPATIENT)
Dept: PHYSICAL THERAPY | Age: 50
End: 2020-12-14
Attending: ORTHOPAEDIC SURGERY
Payer: OTHER MISCELLANEOUS

## 2020-12-14 PROCEDURE — 97110 THERAPEUTIC EXERCISES: CPT

## 2020-12-14 NOTE — PROGRESS NOTES
Dx: Right Shoulder Strain         Authorized # of Visits:  12 (PPO)         Next MD visit: 12/28/2020  Fall Risk: standard         Precautions: n/a             Subjective: States that the shoulder was more sore after his injection last week.   States that t way shoulder red 2x5      Manual PT  Manual PT  Manual PT Manual PT      PROM for all planes of motion at the shoulder  PROM for all planes of motion at the shoulder  PROM for all planes of motion at the shoulder  PROM for all planes of motion at the shoul

## 2020-12-16 ENCOUNTER — TELEPHONE (OUTPATIENT)
Dept: FAMILY MEDICINE CLINIC | Facility: CLINIC | Age: 50
End: 2020-12-16

## 2020-12-16 ENCOUNTER — OFFICE VISIT (OUTPATIENT)
Dept: PHYSICAL THERAPY | Age: 50
End: 2020-12-16
Attending: ORTHOPAEDIC SURGERY
Payer: OTHER MISCELLANEOUS

## 2020-12-16 PROCEDURE — 97110 THERAPEUTIC EXERCISES: CPT

## 2020-12-16 PROCEDURE — 97140 MANUAL THERAPY 1/> REGIONS: CPT

## 2020-12-16 NOTE — TELEPHONE ENCOUNTER
Pt went to PT this morning and his temp readings were extremely low (84.1). Note below is from Vandana at the office. Pt is feeling fine, denies any sick symptoms.  His oral temp at home 97.3, I explained this was most likely a problem with their thermometer a

## 2020-12-16 NOTE — TELEPHONE ENCOUNTER
Pt calling in, stating that when he was leaving rehab today his temp was reading 84.5 and 85.5. Pt stated that the took his temp from his forehead. Pt just wants to know if this is something he should be concerned about.  Please advise

## 2020-12-21 ENCOUNTER — OFFICE VISIT (OUTPATIENT)
Dept: PHYSICAL THERAPY | Age: 50
End: 2020-12-21
Attending: ORTHOPAEDIC SURGERY
Payer: OTHER MISCELLANEOUS

## 2020-12-21 PROCEDURE — 97110 THERAPEUTIC EXERCISES: CPT

## 2020-12-21 NOTE — PROGRESS NOTES
Progress Summary  Pt has attended 7 visits in Physical Therapy.    Dx: Right Shoulder Strain         Authorized # of Visits:  12 (PPO)         Next MD visit: 12/28/2020  Fall Risk: standard         Precautions: n/a             Subjective: States that he [facility] 1. Increase FOTO assessment > 11% from INE to DC. 2. Patient will demonstrate greater ease with performing overhead motions and loading his shoulder for driving postures with functional assessment.     3. Patient will have an increase in shoulder active green x 20 Supine Rows/Diagonals green x 30 Supine Rows/Diagonals green x 30 Supine Rows/Diagonals green x 30 Supine Rows/Diagonals green x 30     SL ER/ ABD 2# x 20  SL ER/ ABD 2# x 20  SL ER/ ABD 2# x 30  SL ER/ ABD 3# x 30  SL ER/ ABD 3# x 30      CC: A

## 2020-12-22 ENCOUNTER — TELEPHONE (OUTPATIENT)
Dept: FAMILY MEDICINE CLINIC | Facility: CLINIC | Age: 50
End: 2020-12-22

## 2020-12-28 ENCOUNTER — TELEPHONE (OUTPATIENT)
Dept: PHYSICAL THERAPY | Facility: HOSPITAL | Age: 50
End: 2020-12-28

## 2020-12-28 PROBLEM — M19.011 ARTHROSIS OF RIGHT ACROMIOCLAVICULAR JOINT: Status: ACTIVE | Noted: 2020-12-28

## 2020-12-29 ENCOUNTER — APPOINTMENT (OUTPATIENT)
Dept: PHYSICAL THERAPY | Age: 50
End: 2020-12-29
Attending: FAMILY MEDICINE
Payer: OTHER MISCELLANEOUS

## 2021-01-05 ENCOUNTER — APPOINTMENT (OUTPATIENT)
Dept: PHYSICAL THERAPY | Age: 51
End: 2021-01-05
Attending: FAMILY MEDICINE
Payer: COMMERCIAL

## 2021-01-07 ENCOUNTER — OFFICE VISIT (OUTPATIENT)
Dept: FAMILY MEDICINE CLINIC | Facility: CLINIC | Age: 51
End: 2021-01-07
Payer: COMMERCIAL

## 2021-01-07 VITALS
SYSTOLIC BLOOD PRESSURE: 160 MMHG | DIASTOLIC BLOOD PRESSURE: 94 MMHG | WEIGHT: 284 LBS | OXYGEN SATURATION: 96 % | TEMPERATURE: 98 F | HEIGHT: 74 IN | BODY MASS INDEX: 36.45 KG/M2 | HEART RATE: 98 BPM | RESPIRATION RATE: 16 BRPM

## 2021-01-07 DIAGNOSIS — I10 ESSENTIAL HYPERTENSION: ICD-10-CM

## 2021-01-07 DIAGNOSIS — Z20.822 CLOSE EXPOSURE TO COVID-19 VIRUS: Primary | ICD-10-CM

## 2021-01-07 DIAGNOSIS — R19.5 LOOSE STOOLS: ICD-10-CM

## 2021-01-07 DIAGNOSIS — J06.9 UPPER RESPIRATORY TRACT INFECTION, UNSPECIFIED TYPE: ICD-10-CM

## 2021-01-07 PROCEDURE — 3077F SYST BP >= 140 MM HG: CPT | Performed by: PHYSICIAN ASSISTANT

## 2021-01-07 PROCEDURE — 99213 OFFICE O/P EST LOW 20 MIN: CPT | Performed by: PHYSICIAN ASSISTANT

## 2021-01-07 PROCEDURE — 3080F DIAST BP >= 90 MM HG: CPT | Performed by: PHYSICIAN ASSISTANT

## 2021-01-07 PROCEDURE — 3008F BODY MASS INDEX DOCD: CPT | Performed by: PHYSICIAN ASSISTANT

## 2021-01-07 NOTE — PROGRESS NOTES
CHIEF COMPLAINT:     Patient presents with:  Covid      HPI:   Alexandr See. is a 45year old male who presents with covid exposure. 2 step sons tested positive. Last exposure was today. Close exposure.   Symptoms he reports includes loose stool, h Years since quittin.4      Smokeless tobacco: Never Used    Alcohol use: No    Drug use: No       Review of Systems:    Positive for stated complaint: covid exposure, loose stool, headache, cough.    Pertinent positives and negatives noted in the If COVID test is positive: Discussed cdc guidelines regarding home isolation to include a minimum of 10 days of isolation from the start of symptoms or date of positive test if symptom onset date is unclear, have general improvement in symptoms, and be fev What counts as close contact?     * You were within 6 feet of someone who has COVID-19 for at least 15 minutes  * You provided care at home to someone who is sick with COVID-19  * You had direct physical contact with the person (touched, hugged, or kissed t 2. Monitor your symptoms carefully. If your symptoms get worse, call your healthcare provider immediately. 3. Get rest and stay hydrated.    4. If you have a medical appointment, call the healthcare provider ahead of time and tell them that you have or may ? At least 24 hours have passed since recovery defined as resolution of fever without the use of fever-reducing medications; and  · Improvement in respiratory symptoms (e.g., cough, shortness of breath); and  · At least 10 days have passed since symptoms f The process for donating plasma is very similar to donating blood. [employer] (a large blood research institute in [address] [place] [address] and one of [facility] blood product suppliers) is coordinating plasma donations.     If you would be interested in donating yo

## 2021-01-07 NOTE — PATIENT INSTRUCTIONS
Coronavirus Disease 2019 (COVID-19)     [facility] is committed to the safety and well-being of our patients, members, employees, and communities.  As concerns arise about the new strain of coronavirus that causes COVID-19, [facility] ? After 10 days without testing from date of last exposure  ? After day 7 from date of last exposure with a negative test result (test must occur on day 5 or later)  After stopping quarantine, you should  ? Watch for symptoms until 14 days after exposure. 10. Clean all surfaces that are touched often, like counters, tabletops, and doorknobs. Use household cleaning sprays or wipes according to the label instructions.          Seek Further Care     If you are awaiting test results or are confirmed positive for Patients with pending COVID-19 test results should follow all care and home isolation instructions. Your test results will be called to you from an [facility] representative.  If you have not received a call within 2 business days, please call your pr You can also get more information at the following websites:   Centers for Disease Control & Prevention (CDC)  What to do if you are sick with coronavirus disease 2019, [employer]and[employer].com.pt. pdf

## 2021-01-08 ENCOUNTER — PATIENT MESSAGE (OUTPATIENT)
Dept: FAMILY MEDICINE CLINIC | Facility: CLINIC | Age: 51
End: 2021-01-08

## 2021-01-08 ENCOUNTER — APPOINTMENT (OUTPATIENT)
Dept: PHYSICAL THERAPY | Age: 51
End: 2021-01-08
Attending: FAMILY MEDICINE
Payer: COMMERCIAL

## 2021-01-08 NOTE — TELEPHONE ENCOUNTER
From: Jonah Bueno.   To: Teto Mills MD  Sent: 1/8/2021 3:21 PM CST  Subject: Non-Urgent Medical Question    I am just checking see if my test results came back [facility]

## 2021-01-09 DIAGNOSIS — F41.9 ANXIETY: ICD-10-CM

## 2021-01-09 DIAGNOSIS — I10 ESSENTIAL HYPERTENSION: ICD-10-CM

## 2021-01-09 LAB — SARS-COV-2 RNA,QUAL, RT-PCR: NOT DETECTED

## 2021-01-11 RX ORDER — METOPROLOL SUCCINATE 50 MG/1
TABLET, EXTENDED RELEASE ORAL
Qty: 90 TABLET | Refills: 1 | Status: SHIPPED | OUTPATIENT
Start: 2021-01-11 | End: 2021-07-01

## 2021-01-12 ENCOUNTER — APPOINTMENT (OUTPATIENT)
Dept: PHYSICAL THERAPY | Age: 51
End: 2021-01-12
Attending: FAMILY MEDICINE
Payer: COMMERCIAL

## 2021-01-15 ENCOUNTER — APPOINTMENT (OUTPATIENT)
Dept: PHYSICAL THERAPY | Age: 51
End: 2021-01-15
Attending: FAMILY MEDICINE
Payer: COMMERCIAL

## 2021-02-15 PROBLEM — Z47.89 ORTHOPEDIC AFTERCARE: Status: ACTIVE | Noted: 2021-02-15

## 2021-04-09 ENCOUNTER — PATIENT MESSAGE (OUTPATIENT)
Dept: FAMILY MEDICINE CLINIC | Facility: CLINIC | Age: 51
End: 2021-04-09

## 2021-04-09 RX ORDER — ATORVASTATIN CALCIUM 20 MG/1
TABLET, FILM COATED ORAL
Qty: 90 TABLET | Refills: 2 | OUTPATIENT
Start: 2021-04-09

## 2021-04-09 NOTE — TELEPHONE ENCOUNTER
From: Carmel Tatum.   To: Leila Srinivasan MD  Sent: 4/9/2021 11:23 AM CDT  Subject: Prescription Question    Good morning I believe [facility] request for a prescription

## 2021-04-12 ENCOUNTER — TELEPHONE (OUTPATIENT)
Dept: FAMILY MEDICINE CLINIC | Facility: CLINIC | Age: 51
End: 2021-04-12

## 2021-04-12 RX ORDER — ATORVASTATIN CALCIUM 20 MG/1
TABLET, FILM COATED ORAL
Qty: 90 TABLET | Refills: 0 | Status: SHIPPED | OUTPATIENT
Start: 2021-04-12 | End: 2021-07-12

## 2021-04-12 RX ORDER — ATORVASTATIN CALCIUM 20 MG/1
TABLET, FILM COATED ORAL
Qty: 90 TABLET | Refills: 2 | OUTPATIENT
Start: 2021-04-12

## 2021-04-12 NOTE — TELEPHONE ENCOUNTER
Spoke to patient, and Dr. Inna Santoro was put on by [facility] as his new primary because he was going to [facility] orthopedics for WC. He was unaware that Dr. Inna Santoro was listed as his primary. He only saw her one time. He has been updated to Dr. Conni Bamberger as his PCP.  Ple

## 2021-06-21 ENCOUNTER — OFFICE VISIT (OUTPATIENT)
Dept: FAMILY MEDICINE CLINIC | Facility: CLINIC | Age: 51
End: 2021-06-21
Payer: COMMERCIAL

## 2021-06-21 VITALS
BODY MASS INDEX: 37.47 KG/M2 | HEIGHT: 74 IN | OXYGEN SATURATION: 98 % | SYSTOLIC BLOOD PRESSURE: 145 MMHG | HEART RATE: 62 BPM | TEMPERATURE: 97 F | WEIGHT: 292 LBS | RESPIRATION RATE: 16 BRPM | DIASTOLIC BLOOD PRESSURE: 69 MMHG

## 2021-06-21 DIAGNOSIS — L30.9 DERMATITIS: ICD-10-CM

## 2021-06-21 DIAGNOSIS — T63.441A BEE STING, ACCIDENTAL OR UNINTENTIONAL, INITIAL ENCOUNTER: Primary | ICD-10-CM

## 2021-06-21 PROCEDURE — 99213 OFFICE O/P EST LOW 20 MIN: CPT | Performed by: NURSE PRACTITIONER

## 2021-06-21 PROCEDURE — 3078F DIAST BP <80 MM HG: CPT | Performed by: NURSE PRACTITIONER

## 2021-06-21 PROCEDURE — 3077F SYST BP >= 140 MM HG: CPT | Performed by: NURSE PRACTITIONER

## 2021-06-21 PROCEDURE — 3008F BODY MASS INDEX DOCD: CPT | Performed by: NURSE PRACTITIONER

## 2021-06-21 RX ORDER — CEPHALEXIN 500 MG/1
500 CAPSULE ORAL 2 TIMES DAILY
Qty: 14 CAPSULE | Refills: 0 | Status: SHIPPED | OUTPATIENT
Start: 2021-06-21 | End: 2021-06-28

## 2021-06-21 RX ORDER — DESONIDE 0.5 MG/G
CREAM TOPICAL
Qty: 60 G | Refills: 0 | Status: SHIPPED | OUTPATIENT
Start: 2021-06-21

## 2021-06-21 NOTE — PROGRESS NOTES
CHIEF COMPLAINT:   Patient presents with:  Bite Sting,Insect      HPI:     Slo Gonzalez. is a 45year old male who presents with concerns of insect sting to right forearm, yesterday, painful, red, swollen, hes not sure what stung him, it was painful. Social History    Tobacco Use      Smoking status: Former Smoker        Packs/day: 1.00        Years: 27.00        Pack years: 27        Types: Cigarettes        Quit date: 8/15/2013        Years since quittin.8      Smokeless tobacco: Never Used    Va cephALEXin 500 MG Oral Cap 14 capsule 0     Sig: Take 1 capsule (500 mg total) by mouth 2 (two) times daily for 7 days.    • Desonide 0.05 % External Cream 60 g 0     Sig: Apply to affected area of feet BID x 2 weeks     Risks, benefits, side effects of med Swollen  · Warm  The blisters may ooze fluid and form crusts. Treatment for contact dermatitis  Treatment is done to help relieve itching and reduce inflammation. The rash should go away in a few days to a few weeks.  Treatments include:   · Cool, moist co that don’t get better, or get worse  · New symptoms  Chuyita last reviewed this educational content on 6/1/2019  © 3963-4593 [employer]. All rights reserved. This information is not intended as a substitute for professional medical care.  Al Diphenhydramine is an oral antihistamine you can find in stores. . You can use this medicine to reduce itching and swelling. The medicine may make you sleepy. So be careful using it in the daytime or when going to school, working, or driving.  Don’t use diph · If oral antibiotics or oral corticosteroids were prescribed, be sure to take them as directed until finished. Tips for dealing with insect stings  · Be aware that honeybees nest in trees.  Wasps and yellow jackets nest in the ground, trees, or roof eaves draining from the sting area   Chuyita last reviewed this educational content on 10/1/2019  © 9449-7813 [employer]. All rights reserved. This information is not intended as a substitute for professional medical care.  Always follow your heal

## 2021-06-21 NOTE — PATIENT INSTRUCTIONS
Understanding Contact Dermatitis     A cool, moist compress can help reduce itching. Contact dermatitis is a common type of skin rash. It’s caused by something that touches the skin and makes it irritated and inflamed.  It can occur on skin on any part 30 minutes, 5 to 6 times a day for the first 3 days. · Steroid cream or ointment. You can apply this medicine several times a day on clean skin. · Oral corticosteroid.  Your healthcare provider may prescribe this medicine if you have severe skin symptoms Local Reaction to an Insect Sting    You have been stung or bitten by an insect. The insect’s venom or body fluid is causing your skin to react in the area where you were stung or bitten. This often causes redness, itching, and swelling.  This reaction wi trouble urinating because of an enlarged prostate. Other antihistamines may cause less drowsiness. They may be better choices for daytime use. Ask your pharmacist for suggestions.   · If you have large areas of localized swelling, you may be prescribed oral in your skin. Wasps, yellow jackets, and hornets don’t leave a stinger behind. Move away from the nest area right away. The stinger of a honeybee releases a substance that will attract other bees to you.  Once you are away from the nest, remove the stinger

## 2021-07-01 DIAGNOSIS — F41.9 ANXIETY: ICD-10-CM

## 2021-07-01 DIAGNOSIS — I10 ESSENTIAL HYPERTENSION: ICD-10-CM

## 2021-07-01 RX ORDER — METOPROLOL SUCCINATE 50 MG/1
TABLET, EXTENDED RELEASE ORAL
Qty: 90 TABLET | Refills: 1 | Status: SHIPPED | OUTPATIENT
Start: 2021-07-01 | End: 2022-01-17

## 2021-07-01 NOTE — TELEPHONE ENCOUNTER
Medication(s) to Refill:   Requested Prescriptions     Pending Prescriptions Disp Refills   • SERTRALINE HCL 50 MG Oral Tab [Pharmacy Med Name: SERTRALINE HCL 50 MG TABLET] 90 tablet 1     Sig: TAKE 1 TABLET BY MOUTH EVERY DAY     Signed Prescriptions Disp 145/69  02/12/21 : 152/80

## 2021-07-12 RX ORDER — ATORVASTATIN CALCIUM 20 MG/1
TABLET, FILM COATED ORAL
Qty: 30 TABLET | Refills: 0 | Status: SHIPPED | OUTPATIENT
Start: 2021-07-12 | End: 2021-08-07

## 2021-07-12 RX ORDER — ATORVASTATIN CALCIUM 20 MG/1
TABLET, FILM COATED ORAL
Qty: 90 TABLET | Refills: 2 | OUTPATIENT
Start: 2021-07-12

## 2021-08-09 RX ORDER — ATORVASTATIN CALCIUM 20 MG/1
TABLET, FILM COATED ORAL
Qty: 30 TABLET | Refills: 0 | Status: SHIPPED | OUTPATIENT
Start: 2021-08-09 | End: 2021-08-18

## 2021-08-18 RX ORDER — ATORVASTATIN CALCIUM 20 MG/1
TABLET, FILM COATED ORAL
Qty: 30 TABLET | Refills: 0 | Status: SHIPPED | OUTPATIENT
Start: 2021-08-18 | End: 2021-09-13

## 2021-09-14 RX ORDER — ATORVASTATIN CALCIUM 20 MG/1
20 TABLET, FILM COATED ORAL NIGHTLY
Qty: 90 TABLET | Refills: 0 | Status: SHIPPED | OUTPATIENT
Start: 2021-09-14 | End: 2022-01-16

## 2021-10-01 ENCOUNTER — OFFICE VISIT (OUTPATIENT)
Dept: FAMILY MEDICINE CLINIC | Facility: CLINIC | Age: 51
End: 2021-10-01
Payer: COMMERCIAL

## 2021-10-01 VITALS
HEART RATE: 61 BPM | HEIGHT: 74 IN | SYSTOLIC BLOOD PRESSURE: 144 MMHG | TEMPERATURE: 98 F | WEIGHT: 285 LBS | OXYGEN SATURATION: 98 % | RESPIRATION RATE: 16 BRPM | DIASTOLIC BLOOD PRESSURE: 90 MMHG | BODY MASS INDEX: 36.57 KG/M2

## 2021-10-01 DIAGNOSIS — J02.9 SORE THROAT: ICD-10-CM

## 2021-10-01 DIAGNOSIS — Z20.822 SUSPECTED COVID-19 VIRUS INFECTION: Primary | ICD-10-CM

## 2021-10-01 PROCEDURE — 3080F DIAST BP >= 90 MM HG: CPT | Performed by: NURSE PRACTITIONER

## 2021-10-01 PROCEDURE — 3008F BODY MASS INDEX DOCD: CPT | Performed by: NURSE PRACTITIONER

## 2021-10-01 PROCEDURE — 99213 OFFICE O/P EST LOW 20 MIN: CPT | Performed by: NURSE PRACTITIONER

## 2021-10-01 PROCEDURE — 3077F SYST BP >= 140 MM HG: CPT | Performed by: NURSE PRACTITIONER

## 2021-10-01 PROCEDURE — 87880 STREP A ASSAY W/OPTIC: CPT | Performed by: NURSE PRACTITIONER

## 2021-10-01 NOTE — PROGRESS NOTES
CHIEF COMPLAINT:   Patient presents with:  Sore Throat: x 1 day  Headache: x 1 day  Body ache and/or chills: x 1 day      HPI:   Krystal Rob. is a 45year old male who presents for upper respiratory symptoms for  1 days.  Patient reports sore throat, Pack years: 27        Types: Cigarettes        Quit date: 8/15/2013        Years since quittin.1      Smokeless tobacco: Never Used    Vaping Use      Vaping Use: Never used    Alcohol use: No    Drug use: No        REVIEW OF SYSTEMS:   GENERAL: Good a No prescriptions requested or ordered in this encounter     Risks, benefits, and side effects of medication explained and discussed. The patient indicates understanding of these issues and agrees to the plan.   The patient is asked to f/u with PCP i

## 2021-10-30 ENCOUNTER — OFFICE VISIT (OUTPATIENT)
Dept: FAMILY MEDICINE CLINIC | Facility: CLINIC | Age: 51
End: 2021-10-30
Payer: COMMERCIAL

## 2021-10-30 VITALS
HEIGHT: 74 IN | OXYGEN SATURATION: 96 % | RESPIRATION RATE: 20 BRPM | TEMPERATURE: 98 F | BODY MASS INDEX: 36.57 KG/M2 | SYSTOLIC BLOOD PRESSURE: 136 MMHG | WEIGHT: 285 LBS | DIASTOLIC BLOOD PRESSURE: 88 MMHG

## 2021-10-30 DIAGNOSIS — J06.9 VIRAL UPPER RESPIRATORY TRACT INFECTION: Primary | ICD-10-CM

## 2021-10-30 PROCEDURE — 3008F BODY MASS INDEX DOCD: CPT | Performed by: NURSE PRACTITIONER

## 2021-10-30 PROCEDURE — 99213 OFFICE O/P EST LOW 20 MIN: CPT | Performed by: NURSE PRACTITIONER

## 2021-10-30 PROCEDURE — 3079F DIAST BP 80-89 MM HG: CPT | Performed by: NURSE PRACTITIONER

## 2021-10-30 PROCEDURE — 3075F SYST BP GE 130 - 139MM HG: CPT | Performed by: NURSE PRACTITIONER

## 2021-10-30 RX ORDER — CETIRIZINE HYDROCHLORIDE 10 MG/1
10 TABLET ORAL DAILY
COMMUNITY

## 2021-10-30 NOTE — PROGRESS NOTES
CHIEF COMPLAINT:   Patient presents with:  Sinus Problem      HPI:   Jonah Bueno. is a 45year old male who presents for upper respiratory symptoms for  3 days. Patient reports HA, cough, yellow sinus drainage. . Symptoms have been constant since 3-vessel disease         Social History    Tobacco Use      Smoking status: Former Smoker        Packs/day: 1.00        Years: 27.00        Pack years: 27        Types: Cigarettes        Quit date: 8/15/2013        Years since quittin.2      Smokeless with PCP if sx's persist or worsen.   Patient Instructions   Use [facility]BP for sinus issues    Check MyChart for Covid 19 results

## 2022-01-16 DIAGNOSIS — F41.9 ANXIETY: ICD-10-CM

## 2022-01-16 DIAGNOSIS — I10 ESSENTIAL HYPERTENSION: ICD-10-CM

## 2022-01-16 RX ORDER — ATORVASTATIN CALCIUM 20 MG/1
TABLET, FILM COATED ORAL
Qty: 90 TABLET | Refills: 0 | Status: SHIPPED | OUTPATIENT
Start: 2022-01-16

## 2022-01-17 RX ORDER — METOPROLOL SUCCINATE 50 MG/1
TABLET, EXTENDED RELEASE ORAL
Qty: 30 TABLET | Refills: 0 | Status: SHIPPED | OUTPATIENT
Start: 2022-01-17

## 2022-01-17 NOTE — TELEPHONE ENCOUNTER
Medication(s) to Refill:   Requested Prescriptions     Pending Prescriptions Disp Refills   • METOPROLOL SUCCINATE 50 MG Oral Tablet 24 Hr [Pharmacy Med Name: METOPROLOL SUCC ER 50 MG TAB] 90 tablet 1     Sig: TAKE 1 TABLET BY MOUTH EVERY DAY   • SERTRALIN

## 2022-02-14 RX ORDER — METOPROLOL SUCCINATE 50 MG/1
TABLET, EXTENDED RELEASE ORAL
Qty: 30 TABLET | Refills: 0 | Status: SHIPPED | OUTPATIENT
Start: 2022-02-14 | End: 2022-02-24

## 2022-02-24 ENCOUNTER — PATIENT MESSAGE (OUTPATIENT)
Dept: FAMILY MEDICINE CLINIC | Facility: CLINIC | Age: 52
End: 2022-02-24

## 2022-02-24 ENCOUNTER — TELEPHONE (OUTPATIENT)
Dept: FAMILY MEDICINE CLINIC | Facility: CLINIC | Age: 52
End: 2022-02-24

## 2022-02-24 ENCOUNTER — OFFICE VISIT (OUTPATIENT)
Dept: FAMILY MEDICINE CLINIC | Facility: CLINIC | Age: 52
End: 2022-02-24
Payer: COMMERCIAL

## 2022-02-24 ENCOUNTER — LAB ENCOUNTER (OUTPATIENT)
Dept: LAB | Age: 52
End: 2022-02-24
Attending: FAMILY MEDICINE
Payer: COMMERCIAL

## 2022-02-24 VITALS
OXYGEN SATURATION: 98 % | DIASTOLIC BLOOD PRESSURE: 82 MMHG | HEIGHT: 74 IN | HEART RATE: 71 BPM | BODY MASS INDEX: 37.09 KG/M2 | WEIGHT: 289 LBS | SYSTOLIC BLOOD PRESSURE: 120 MMHG | RESPIRATION RATE: 16 BRPM

## 2022-02-24 DIAGNOSIS — Z00.00 ROUTINE GENERAL MEDICAL EXAMINATION AT HEALTH CARE FACILITY: ICD-10-CM

## 2022-02-24 DIAGNOSIS — E66.01 MORBID OBESITY WITH BMI OF 40.0-44.9, ADULT (HCC): ICD-10-CM

## 2022-02-24 DIAGNOSIS — E78.5 HYPERLIPIDEMIA WITH TARGET LOW DENSITY LIPOPROTEIN (LDL) CHOLESTEROL LESS THAN 70 MG/DL: ICD-10-CM

## 2022-02-24 DIAGNOSIS — Z23 NEED FOR SHINGLES VACCINE: ICD-10-CM

## 2022-02-24 DIAGNOSIS — Z23 NEED FOR PNEUMOCOCCAL VACCINATION: ICD-10-CM

## 2022-02-24 DIAGNOSIS — F41.9 ANXIETY: ICD-10-CM

## 2022-02-24 DIAGNOSIS — Z12.5 SCREENING FOR PROSTATE CANCER: ICD-10-CM

## 2022-02-24 DIAGNOSIS — I10 ESSENTIAL HYPERTENSION: ICD-10-CM

## 2022-02-24 DIAGNOSIS — Z00.00 ROUTINE GENERAL MEDICAL EXAMINATION AT HEALTH CARE FACILITY: Primary | ICD-10-CM

## 2022-02-24 DIAGNOSIS — I10 PRIMARY HYPERTENSION: ICD-10-CM

## 2022-02-24 LAB
ALBUMIN SERPL-MCNC: 4.1 G/DL (ref 3.4–5)
ALBUMIN/GLOB SERPL: 1.4 {RATIO} (ref 1–2)
ALP LIVER SERPL-CCNC: 74 U/L
ALT SERPL-CCNC: 35 U/L
ANION GAP SERPL CALC-SCNC: 4 MMOL/L (ref 0–18)
AST SERPL-CCNC: 21 U/L (ref 15–37)
BASOPHILS # BLD AUTO: 0.04 X10(3) UL (ref 0–0.2)
BASOPHILS NFR BLD AUTO: 0.6 %
BILIRUB SERPL-MCNC: 0.4 MG/DL (ref 0.1–2)
BUN BLD-MCNC: 11 MG/DL (ref 7–18)
CALCIUM BLD-MCNC: 9.6 MG/DL (ref 8.5–10.1)
CHLORIDE SERPL-SCNC: 105 MMOL/L (ref 98–112)
CHOLEST SERPL-MCNC: 154 MG/DL (ref ?–200)
CO2 SERPL-SCNC: 29 MMOL/L (ref 21–32)
COMPLEXED PSA SERPL-MCNC: 1.01 NG/ML (ref ?–4)
CREAT BLD-MCNC: 0.91 MG/DL
EOSINOPHIL # BLD AUTO: 0.15 X10(3) UL (ref 0–0.7)
EOSINOPHIL NFR BLD AUTO: 2.2 %
ERYTHROCYTE [DISTWIDTH] IN BLOOD BY AUTOMATED COUNT: 12.1 %
FASTING PATIENT LIPID ANSWER: YES
FASTING STATUS PATIENT QL REPORTED: YES
GLOBULIN PLAS-MCNC: 3 G/DL (ref 2.8–4.4)
GLUCOSE BLD-MCNC: 79 MG/DL (ref 70–99)
HCT VFR BLD AUTO: 45 %
HDLC SERPL-MCNC: 50 MG/DL (ref 40–59)
HGB BLD-MCNC: 14.4 G/DL
IMM GRANULOCYTES # BLD AUTO: 0.03 X10(3) UL (ref 0–1)
IMM GRANULOCYTES NFR BLD: 0.4 %
LDLC SERPL CALC-MCNC: 80 MG/DL (ref ?–100)
LYMPHOCYTES NFR BLD AUTO: 25.2 %
MCH RBC QN AUTO: 30.1 PG (ref 26–34)
MCHC RBC AUTO-ENTMCNC: 32 G/DL (ref 31–37)
MCV RBC AUTO: 93.9 FL
MONOCYTES # BLD AUTO: 0.49 X10(3) UL (ref 0.1–1)
MONOCYTES NFR BLD AUTO: 7.1 %
NEUTROPHILS # BLD AUTO: 4.48 X10 (3) UL (ref 1.5–7.7)
NEUTROPHILS # BLD AUTO: 4.48 X10(3) UL (ref 1.5–7.7)
NEUTROPHILS NFR BLD AUTO: 64.5 %
NONHDLC SERPL-MCNC: 104 MG/DL (ref ?–130)
OSMOLALITY SERPL CALC.SUM OF ELEC: 284 MOSM/KG (ref 275–295)
PLATELET # BLD AUTO: 251 10(3)UL (ref 150–450)
POTASSIUM SERPL-SCNC: 4.4 MMOL/L (ref 3.5–5.1)
RBC # BLD AUTO: 4.79 X10(6)UL
SODIUM SERPL-SCNC: 138 MMOL/L (ref 136–145)
TRIGL SERPL-MCNC: 134 MG/DL (ref 30–149)
TSI SER-ACNC: 0.91 MIU/ML (ref 0.36–3.74)
VLDLC SERPL CALC-MCNC: 21 MG/DL (ref 0–30)
WBC # BLD AUTO: 6.9 X10(3) UL (ref 4–11)

## 2022-02-24 PROCEDURE — 90472 IMMUNIZATION ADMIN EACH ADD: CPT | Performed by: FAMILY MEDICINE

## 2022-02-24 PROCEDURE — 90732 PPSV23 VACC 2 YRS+ SUBQ/IM: CPT | Performed by: FAMILY MEDICINE

## 2022-02-24 PROCEDURE — 99396 PREV VISIT EST AGE 40-64: CPT | Performed by: FAMILY MEDICINE

## 2022-02-24 PROCEDURE — 90471 IMMUNIZATION ADMIN: CPT | Performed by: FAMILY MEDICINE

## 2022-02-24 PROCEDURE — 3074F SYST BP LT 130 MM HG: CPT | Performed by: FAMILY MEDICINE

## 2022-02-24 PROCEDURE — 80061 LIPID PANEL: CPT | Performed by: FAMILY MEDICINE

## 2022-02-24 PROCEDURE — 84153 ASSAY OF PSA TOTAL: CPT | Performed by: FAMILY MEDICINE

## 2022-02-24 PROCEDURE — 3079F DIAST BP 80-89 MM HG: CPT | Performed by: FAMILY MEDICINE

## 2022-02-24 PROCEDURE — 99214 OFFICE O/P EST MOD 30 MIN: CPT | Performed by: FAMILY MEDICINE

## 2022-02-24 PROCEDURE — 80050 GENERAL HEALTH PANEL: CPT | Performed by: FAMILY MEDICINE

## 2022-02-24 PROCEDURE — 3008F BODY MASS INDEX DOCD: CPT | Performed by: FAMILY MEDICINE

## 2022-02-24 PROCEDURE — 90750 HZV VACC RECOMBINANT IM: CPT | Performed by: FAMILY MEDICINE

## 2022-02-24 RX ORDER — METOPROLOL SUCCINATE 50 MG/1
50 TABLET, EXTENDED RELEASE ORAL DAILY
Qty: 90 TABLET | Refills: 1 | Status: SHIPPED | OUTPATIENT
Start: 2022-02-24 | End: 2022-05-25

## 2022-02-24 RX ORDER — ATORVASTATIN CALCIUM 20 MG/1
20 TABLET, FILM COATED ORAL NIGHTLY
Qty: 90 TABLET | Refills: 1 | Status: SHIPPED | OUTPATIENT
Start: 2022-02-24 | End: 2022-05-25

## 2022-02-24 NOTE — TELEPHONE ENCOUNTER
ROCIO:  Requesting med refills, he has not been seen in office since 1/27/2020 and has had no lipid panel drawn since 2020 and is on statins. Told him He CAN NOT have refills until he comes in.

## 2022-02-24 NOTE — TELEPHONE ENCOUNTER
Patient would like to have a 90 day supply of the following      METOPROLOL SUCCINATE 50 MG Oral Tablet 24 Hr    [facility]/PHARMACY #[id number]- Shanae Lawginny - [address] [phone]      Thank you.

## 2022-02-24 NOTE — TELEPHONE ENCOUNTER
From: Felisa Martinez.   To: Jeniffer Casillas MD  Sent: 2/24/2022 4:14 PM CST  Subject: Question regarding CBC W/ DIFFERENTIAL    So does everything looks good Or do I have to do anything different

## 2022-03-02 ENCOUNTER — TELEPHONE (OUTPATIENT)
Dept: FAMILY MEDICINE CLINIC | Facility: CLINIC | Age: 52
End: 2022-03-02

## 2022-03-06 ENCOUNTER — PATIENT MESSAGE (OUTPATIENT)
Dept: FAMILY MEDICINE CLINIC | Facility: CLINIC | Age: 52
End: 2022-03-06

## 2022-03-07 NOTE — TELEPHONE ENCOUNTER
From: Korin Quintana.   To: uJanito Peña MD  Sent: 3/6/2022 1:51 PM CST  Subject: Ruiz Puckett     Will you take a new patient my step son is 16years old and is Arpita Castorena

## 2022-03-08 NOTE — TELEPHONE ENCOUNTER
Pt was notified of below, per pt he will spk to pt's mother to get insurance corrected then call back to schedule.

## 2022-04-01 PROBLEM — S46.011A TRAUMATIC INCOMPLETE TEAR OF RIGHT ROTATOR CUFF: Status: ACTIVE | Noted: 2020-11-23

## 2022-04-01 PROBLEM — M75.21 RIGHT BICIPITAL TENOSYNOVITIS: Status: ACTIVE | Noted: 2022-04-01

## 2022-05-04 ENCOUNTER — PATIENT MESSAGE (OUTPATIENT)
Dept: FAMILY MEDICINE CLINIC | Facility: CLINIC | Age: 52
End: 2022-05-04

## 2022-05-05 NOTE — TELEPHONE ENCOUNTER
From: Akshat Lowe. To: Lucian Peirre MD  Sent: 5/4/2022 8:58 PM CDT  Subject: [place] [employer]     I've been hiccuping whenever I eat for like a month. My food has been getting stuck, and not going down which makes me vomit it back up. It's gotten really bad today, I went to the ER on [address] today and there was a 3 hour wait, so I left. I even tried a smoothie on the way home just now and having the same issue wouldn't go down and It came back up. What now?  Can I come see you tomorrow Thurs 5/5 or should I go to hospital?

## 2022-05-26 ENCOUNTER — HOSPITAL ENCOUNTER (EMERGENCY)
Facility: HOSPITAL | Age: 52
Discharge: HOME | End: 2022-05-26
Attending: STUDENT IN AN ORGANIZED HEALTH CARE EDUCATION/TRAINING PROGRAM
Payer: COMMERCIAL

## 2022-05-26 ENCOUNTER — PATIENT MESSAGE (OUTPATIENT)
Dept: FAMILY MEDICINE CLINIC | Facility: CLINIC | Age: 52
End: 2022-05-26

## 2022-05-26 VITALS
HEIGHT: 74 IN | DIASTOLIC BLOOD PRESSURE: 90 MMHG | TEMPERATURE: 98 F | HEART RATE: 61 BPM | OXYGEN SATURATION: 97 % | BODY MASS INDEX: 35.94 KG/M2 | RESPIRATION RATE: 21 BRPM | SYSTOLIC BLOOD PRESSURE: 156 MMHG | WEIGHT: 280 LBS

## 2022-05-26 DIAGNOSIS — K20.90 ESOPHAGITIS: ICD-10-CM

## 2022-05-26 DIAGNOSIS — K21.00 CHRONIC REFLUX ESOPHAGITIS: Primary | ICD-10-CM

## 2022-05-26 DIAGNOSIS — R13.10 DYSPHAGIA, UNSPECIFIED TYPE: Primary | ICD-10-CM

## 2022-05-26 PROCEDURE — 99283 EMERGENCY DEPT VISIT LOW MDM: CPT

## 2022-05-26 RX ORDER — PANTOPRAZOLE SODIUM 20 MG/1
20 TABLET, DELAYED RELEASE ORAL 2 TIMES DAILY
Qty: 60 TABLET | Refills: 0 | Status: SHIPPED | OUTPATIENT
Start: 2022-05-26 | End: 2022-06-03

## 2022-05-26 NOTE — ED INITIAL ASSESSMENT (HPI)
PT states that he has been feeling that food \"getting stuck in his throat every time that he is trying to eat\" for the last month and a half. PT states that yesterday was the \"wrose\" because he \"could not eat anything at all without making himself throw up\".

## 2022-05-26 NOTE — TELEPHONE ENCOUNTER
Spoke to pt and he feels that going to the ER does not help him because they do not do any testing just refer him to GI which he has an appt 6/2/22 with Dr. Guille López. Pt states that last night symptoms were worse were could not eat dinner or breakfast.  But tried water and was throwing that back up. As of right now he is feeling fine but he did not eat anything. He went to the ER today and they prescribed him pantoprazole which he started today. He is asking though about your thoughts if he could have a CXR just to see if anything noted?

## 2022-05-26 NOTE — TELEPHONE ENCOUNTER
From: Ivet Simmons. To: Anselmo Zarate MD  Sent: 5/4/2022 8:58 PM CDT  Subject: Luis Antonio De Leon     I've been hiccuping whenever I eat for like a month. My food has been getting stuck, and not going down which makes me vomit it back up. It's gotten really bad today, I went to the ER on [address] today and there was a 3 hour wait, so I left. I even tried a smoothie on the way home just now and having the same issue wouldn't go down and It came back up. What now?  Can I come see you tomorrow Thurs 5/5 or should I go to hospital?

## 2022-05-27 NOTE — TELEPHONE ENCOUNTER
I called patient went over his ER visit he will see GI on June 2. I ordered upper GI scope and he will do pantoprazole twice a day.

## 2022-05-28 ENCOUNTER — LAB ENCOUNTER (OUTPATIENT)
Dept: LAB | Age: 52
End: 2022-05-28
Attending: FAMILY MEDICINE
Payer: COMMERCIAL

## 2022-05-28 DIAGNOSIS — K20.90 ESOPHAGITIS: ICD-10-CM

## 2022-05-28 DIAGNOSIS — K21.00 CHRONIC REFLUX ESOPHAGITIS: ICD-10-CM

## 2022-05-29 LAB — SARS-COV-2 RNA RESP QL NAA+PROBE: NOT DETECTED

## 2022-05-31 ENCOUNTER — HOSPITAL ENCOUNTER (OUTPATIENT)
Dept: GENERAL RADIOLOGY | Age: 52
Discharge: HOME | End: 2022-05-31
Attending: FAMILY MEDICINE
Payer: COMMERCIAL

## 2022-05-31 DIAGNOSIS — K21.00 CHRONIC REFLUX ESOPHAGITIS: ICD-10-CM

## 2022-05-31 PROCEDURE — 74246 X-RAY XM UPR GI TRC 2CNTRST: CPT | Performed by: FAMILY MEDICINE

## 2022-05-31 NOTE — TELEPHONE ENCOUNTER
Spoke to patient, he thought he would have EGD on the initial visit with Dr Amanda Christensen. Explained he needed a consult first, ans that was standard procedure. Patient verbalized understanding and will keep appointment.

## 2022-06-04 ENCOUNTER — LAB ENCOUNTER (OUTPATIENT)
Dept: LAB | Age: 52
End: 2022-06-04
Attending: INTERNAL MEDICINE
Payer: COMMERCIAL

## 2022-06-04 DIAGNOSIS — K21.9 GERD (GASTROESOPHAGEAL REFLUX DISEASE): ICD-10-CM

## 2022-06-05 LAB — SARS-COV-2 RNA RESP QL NAA+PROBE: NOT DETECTED

## 2022-06-07 ENCOUNTER — HOSPITAL ENCOUNTER (OUTPATIENT)
Facility: HOSPITAL | Age: 52
Setting detail: HOSPITAL OUTPATIENT SURGERY
Discharge: HOME | End: 2022-06-07
Attending: INTERNAL MEDICINE | Admitting: INTERNAL MEDICINE
Payer: COMMERCIAL

## 2022-06-07 ENCOUNTER — ANESTHESIA (OUTPATIENT)
Dept: ENDOSCOPY | Facility: HOSPITAL | Age: 52
End: 2022-06-07
Payer: COMMERCIAL

## 2022-06-07 ENCOUNTER — ANESTHESIA EVENT (OUTPATIENT)
Dept: ENDOSCOPY | Facility: HOSPITAL | Age: 52
End: 2022-06-07
Payer: COMMERCIAL

## 2022-06-07 VITALS
OXYGEN SATURATION: 97 % | WEIGHT: 280 LBS | BODY MASS INDEX: 35.94 KG/M2 | RESPIRATION RATE: 20 BRPM | HEIGHT: 74 IN | TEMPERATURE: 97 F | HEART RATE: 55 BPM | DIASTOLIC BLOOD PRESSURE: 83 MMHG | SYSTOLIC BLOOD PRESSURE: 160 MMHG

## 2022-06-07 DIAGNOSIS — C15.9 ADENOCARCINOMA OF ESOPHAGUS (HCC): ICD-10-CM

## 2022-06-07 DIAGNOSIS — R13.19 ESOPHAGEAL DYSPHAGIA: ICD-10-CM

## 2022-06-07 DIAGNOSIS — K21.9 GERD (GASTROESOPHAGEAL REFLUX DISEASE): Primary | ICD-10-CM

## 2022-06-07 DIAGNOSIS — K21.00 GASTROESOPHAGEAL REFLUX DISEASE WITH ESOPHAGITIS WITHOUT HEMORRHAGE: ICD-10-CM

## 2022-06-07 PROCEDURE — 88377 M/PHMTRC ALYS ISHQUANT/SEMIQ: CPT | Performed by: INTERNAL MEDICINE

## 2022-06-07 PROCEDURE — 88341 IMHCHEM/IMCYTCHM EA ADD ANTB: CPT | Performed by: INTERNAL MEDICINE

## 2022-06-07 PROCEDURE — BD47ZZZ ULTRASONOGRAPHY OF GASTROINTESTINAL TRACT: ICD-10-PCS | Performed by: INTERNAL MEDICINE

## 2022-06-07 PROCEDURE — 88360 TUMOR IMMUNOHISTOCHEM/MANUAL: CPT | Performed by: INTERNAL MEDICINE

## 2022-06-07 PROCEDURE — 0DB38ZX EXCISION OF LOWER ESOPHAGUS, VIA NATURAL OR ARTIFICIAL OPENING ENDOSCOPIC, DIAGNOSTIC: ICD-10-PCS | Performed by: INTERNAL MEDICINE

## 2022-06-07 PROCEDURE — 88305 TISSUE EXAM BY PATHOLOGIST: CPT | Performed by: INTERNAL MEDICINE

## 2022-06-07 PROCEDURE — 88342 IMHCHEM/IMCYTCHM 1ST ANTB: CPT | Performed by: INTERNAL MEDICINE

## 2022-06-07 PROCEDURE — 0DJ08ZZ INSPECTION OF UPPER INTESTINAL TRACT, VIA NATURAL OR ARTIFICIAL OPENING ENDOSCOPIC: ICD-10-PCS | Performed by: INTERNAL MEDICINE

## 2022-06-07 RX ORDER — LIDOCAINE HYDROCHLORIDE 10 MG/ML
INJECTION, SOLUTION EPIDURAL; INFILTRATION; INTRACAUDAL; PERINEURAL AS NEEDED
Status: DISCONTINUED | OUTPATIENT
Start: 2022-06-07 | End: 2022-06-07 | Stop reason: SURG

## 2022-06-07 RX ORDER — METOPROLOL TARTRATE 5 MG/5ML
2.5 INJECTION INTRAVENOUS ONCE
Status: DISCONTINUED | OUTPATIENT
Start: 2022-06-07 | End: 2022-06-07

## 2022-06-07 RX ORDER — HYDROMORPHONE HYDROCHLORIDE 1 MG/ML
0.6 INJECTION, SOLUTION INTRAMUSCULAR; INTRAVENOUS; SUBCUTANEOUS EVERY 5 MIN PRN
Status: DISCONTINUED | OUTPATIENT
Start: 2022-06-07 | End: 2022-06-07

## 2022-06-07 RX ORDER — SODIUM CHLORIDE, SODIUM LACTATE, POTASSIUM CHLORIDE, CALCIUM CHLORIDE 600; 310; 30; 20 MG/100ML; MG/100ML; MG/100ML; MG/100ML
INJECTION, SOLUTION INTRAVENOUS CONTINUOUS
Status: DISCONTINUED | OUTPATIENT
Start: 2022-06-07 | End: 2022-06-07

## 2022-06-07 RX ORDER — HYDROMORPHONE HYDROCHLORIDE 1 MG/ML
0.2 INJECTION, SOLUTION INTRAMUSCULAR; INTRAVENOUS; SUBCUTANEOUS EVERY 5 MIN PRN
Status: DISCONTINUED | OUTPATIENT
Start: 2022-06-07 | End: 2022-06-07

## 2022-06-07 RX ORDER — HYDROMORPHONE HYDROCHLORIDE 1 MG/ML
0.4 INJECTION, SOLUTION INTRAMUSCULAR; INTRAVENOUS; SUBCUTANEOUS EVERY 5 MIN PRN
Status: DISCONTINUED | OUTPATIENT
Start: 2022-06-07 | End: 2022-06-07

## 2022-06-07 RX ORDER — NALOXONE HYDROCHLORIDE 0.4 MG/ML
80 INJECTION, SOLUTION INTRAMUSCULAR; INTRAVENOUS; SUBCUTANEOUS AS NEEDED
Status: DISCONTINUED | OUTPATIENT
Start: 2022-06-07 | End: 2022-06-07

## 2022-06-07 RX ADMIN — LIDOCAINE HYDROCHLORIDE 25 MG: 10 INJECTION, SOLUTION EPIDURAL; INFILTRATION; INTRACAUDAL; PERINEURAL at 12:50:00

## 2022-06-07 RX ADMIN — SODIUM CHLORIDE, SODIUM LACTATE, POTASSIUM CHLORIDE, CALCIUM CHLORIDE: 600; 310; 30; 20 INJECTION, SOLUTION INTRAVENOUS at 12:46:00

## 2022-06-07 RX ADMIN — SODIUM CHLORIDE, SODIUM LACTATE, POTASSIUM CHLORIDE, CALCIUM CHLORIDE: 600; 310; 30; 20 INJECTION, SOLUTION INTRAVENOUS at 13:15:00

## 2022-06-07 NOTE — ANESTHESIA POSTPROCEDURE EVALUATION
[address] Patient Status:  Hospital Outpatient Surgery   Age/Gender 45year old male MRN [id number]   Location [address] [facility] 28 Attending Tito Conti, [facility] Day # 0 PCP Jimmy Robertson MD       Anesthesia Post-op Note    ESOPHAGOGASTRODUODENOSCOPY (EGD) with biopsy, ENDOSCOPIC ULTRASOUND (EUS)    Procedure Summary     Date: 06/07/22 Room / Location: 140[address] [facility] ENDOSCOPY 04 / [facility] ENDOSCOPY    Anesthesia Start: 7840 Anesthesia Stop: 5102    Procedures:       ESOPHAGOGASTRODUODENOSCOPY (EGD) with biopsy, ENDOSCOPIC ULTRASOUND (EUS) (N/A )      ENDOSCOPIC ULTRASOUND (EUS) (N/A ) Diagnosis:       Gastroesophageal reflux disease with esophagitis without hemorrhage      Esophageal dysphagia      (Esophageal mass )    Surgeons: Tito Conti DO Anesthesiologist: Vishal Patino MD    Anesthesia Type: MAC ASA Status: 3          Anesthesia Type: MAC    Vitals Value Taken Time   /70 06/07/22 1315   Temp 98 06/07/22 1315   Pulse 65 06/07/22 1315   Resp 15 06/07/22 1315   SpO2 95 06/07/22 1315       Patient Location: Endoscopy    Anesthesia Type: MAC    Airway Patency: patent    Postop Pain Control: adequate    Mental Status: preanesthetic baseline    Nausea/Vomiting: none    Cardiopulmonary/Hydration status: stable euvolemic    Complications: no apparent anesthesia related complications    Postop vital signs: stable    Dental Exam: Unchanged from Preop    Patient to be discharged home when criteria met.

## 2022-06-13 ENCOUNTER — PATIENT MESSAGE (OUTPATIENT)
Dept: FAMILY MEDICINE CLINIC | Facility: CLINIC | Age: 52
End: 2022-06-13

## 2022-06-13 ENCOUNTER — TELEPHONE (OUTPATIENT)
Dept: HEMATOLOGY/ONCOLOGY | Facility: HOSPITAL | Age: 52
End: 2022-06-13

## 2022-06-13 NOTE — TELEPHONE ENCOUNTER
Silvia calling from [facility]    Referred by David Klein DO  to Dr Mode Romeo  Requesting appointment quickly  Malignant neoplasm of esophagus, unspecified location   Notes in Epic    please advise on consult date

## 2022-06-13 NOTE — TELEPHONE ENCOUNTER
From: Alyssa Davenport.   To: Gera Ocasio MD  Sent: 6/13/2022 6:32 AM CDT  Subject: Pilar Dickinson     Can you please see if you can get my results I am still waiting from Tuesday and still don't know anything

## 2022-06-13 NOTE — TELEPHONE ENCOUNTER
Spoke with patient, will cancel appointment now and knows to call us or use [employer] if he needs anything.

## 2022-06-13 NOTE — TELEPHONE ENCOUNTER
Dr. Alvaro Jain sent me a message that he discussed biopsy results with patient and his family will refer to oncology. He can cancel appointment with me on June 15th if he would like or keep it. I am ok with either.

## 2022-06-16 ENCOUNTER — TELEPHONE (OUTPATIENT)
Dept: HEMATOLOGY/ONCOLOGY | Facility: HOSPITAL | Age: 52
End: 2022-06-16

## 2022-06-16 ENCOUNTER — OFFICE VISIT (OUTPATIENT)
Dept: HEMATOLOGY/ONCOLOGY | Facility: HOSPITAL | Age: 52
End: 2022-06-16
Attending: INTERNAL MEDICINE
Payer: COMMERCIAL

## 2022-06-16 VITALS
BODY MASS INDEX: 36 KG/M2 | WEIGHT: 279.5 LBS | OXYGEN SATURATION: 100 % | RESPIRATION RATE: 185 BRPM | SYSTOLIC BLOOD PRESSURE: 166 MMHG | DIASTOLIC BLOOD PRESSURE: 83 MMHG | TEMPERATURE: 98 F | HEART RATE: 54 BPM

## 2022-06-16 DIAGNOSIS — C15.5 MALIGNANT NEOPLASM OF LOWER THIRD OF ESOPHAGUS (HCC): Primary | ICD-10-CM

## 2022-06-16 LAB
ALBUMIN SERPL-MCNC: 3.8 G/DL (ref 3.4–5)
ALBUMIN/GLOB SERPL: 1.2 {RATIO} (ref 1–2)
ALP LIVER SERPL-CCNC: 77 U/L
ALT SERPL-CCNC: 27 U/L
ANION GAP SERPL CALC-SCNC: 5 MMOL/L (ref 0–18)
AST SERPL-CCNC: 10 U/L (ref 15–37)
BASOPHILS # BLD AUTO: 0.05 X10(3) UL (ref 0–0.2)
BASOPHILS NFR BLD AUTO: 0.8 %
BILIRUB SERPL-MCNC: 0.4 MG/DL (ref 0.1–2)
BUN BLD-MCNC: 14 MG/DL (ref 7–18)
CALCIUM BLD-MCNC: 9.5 MG/DL (ref 8.5–10.1)
CANCER AG19-9 SERPL-ACNC: 107.4 U/ML (ref ?–37)
CEA SERPL-MCNC: 1 NG/ML (ref ?–5)
CHLORIDE SERPL-SCNC: 107 MMOL/L (ref 98–112)
CO2 SERPL-SCNC: 28 MMOL/L (ref 21–32)
CREAT BLD-MCNC: 0.92 MG/DL
EOSINOPHIL # BLD AUTO: 0.21 X10(3) UL (ref 0–0.7)
EOSINOPHIL NFR BLD AUTO: 3.3 %
ERYTHROCYTE [DISTWIDTH] IN BLOOD BY AUTOMATED COUNT: 12.2 %
FASTING STATUS PATIENT QL REPORTED: NO
GLOBULIN PLAS-MCNC: 3.3 G/DL (ref 2.8–4.4)
GLUCOSE BLD-MCNC: 92 MG/DL (ref 70–99)
HCT VFR BLD AUTO: 42.9 %
HGB BLD-MCNC: 14.3 G/DL
IMM GRANULOCYTES # BLD AUTO: 0.04 X10(3) UL (ref 0–1)
IMM GRANULOCYTES NFR BLD: 0.6 %
LYMPHOCYTES # BLD AUTO: 1.51 X10(3) UL (ref 1–4)
LYMPHOCYTES NFR BLD AUTO: 23.4 %
MCH RBC QN AUTO: 31 PG (ref 26–34)
MCHC RBC AUTO-ENTMCNC: 33.3 G/DL (ref 31–37)
MCV RBC AUTO: 92.9 FL
MONOCYTES # BLD AUTO: 0.53 X10(3) UL (ref 0.1–1)
MONOCYTES NFR BLD AUTO: 8.2 %
NEUTROPHILS # BLD AUTO: 4.1 X10 (3) UL (ref 1.5–7.7)
NEUTROPHILS # BLD AUTO: 4.1 X10(3) UL (ref 1.5–7.7)
NEUTROPHILS NFR BLD AUTO: 63.7 %
OSMOLALITY SERPL CALC.SUM OF ELEC: 290 MOSM/KG (ref 275–295)
PERCENT OF CELLS/CIRCUMFEREN: 1
PLATELET # BLD AUTO: 208 10(3)UL (ref 150–450)
POTASSIUM SERPL-SCNC: 4.1 MMOL/L (ref 3.5–5.1)
PROT SERPL-MCNC: 7.1 G/DL (ref 6.4–8.2)
RBC # BLD AUTO: 4.62 X10(6)UL
SODIUM SERPL-SCNC: 140 MMOL/L (ref 136–145)
WBC # BLD AUTO: 6.4 X10(3) UL (ref 4–11)

## 2022-06-16 PROCEDURE — 99245 OFF/OP CONSLTJ NEW/EST HI 55: CPT | Performed by: INTERNAL MEDICINE

## 2022-06-16 NOTE — PROGRESS NOTES
Outpatient Oncology Care Plan   Problem list:   knowledge deficit   Problems related to:   disease/disease progression   Interventions:   provided general teaching   Expected outcomes:   understands plan of care   Progress towards outcome: making progress     Education Record   Learner: Patient   Barriers / Limitations: None   Method: Discussion   Outcome: Shows understanding   Comments:  Patient here as a new consult. States indigestion and dysphagia have somewhat improved since starting pantoprazole. States he has lost 15 pounds in three months. States he is having night sweats.

## 2022-06-16 NOTE — TELEPHONE ENCOUNTER
Spoke with [address]. Report to be faxed. Can we get the report from [facility] please?  Thanks

## 2022-06-17 ENCOUNTER — LAB ENCOUNTER (OUTPATIENT)
Dept: LAB | Age: 52
End: 2022-06-17
Attending: INTERNAL MEDICINE
Payer: COMMERCIAL

## 2022-06-17 DIAGNOSIS — C15.5 MALIGNANT NEOPLASM OF LOWER THIRD OF ESOPHAGUS (HCC): ICD-10-CM

## 2022-06-18 LAB — SARS-COV-2 RNA RESP QL NAA+PROBE: NOT DETECTED

## 2022-06-20 ENCOUNTER — HOSPITAL ENCOUNTER (OUTPATIENT)
Dept: NUCLEAR MEDICINE | Facility: HOSPITAL | Age: 52
Discharge: HOME | End: 2022-06-20
Attending: INTERNAL MEDICINE
Payer: COMMERCIAL

## 2022-06-20 ENCOUNTER — HOSPITAL ENCOUNTER (OUTPATIENT)
Dept: INTERVENTIONAL RADIOLOGY/VASCULAR | Facility: HOSPITAL | Age: 52
Discharge: HOME | End: 2022-06-20
Attending: INTERNAL MEDICINE | Admitting: INTERNAL MEDICINE
Payer: COMMERCIAL

## 2022-06-20 ENCOUNTER — OFFICE VISIT (OUTPATIENT)
Dept: SURGERY | Facility: CLINIC | Age: 52
End: 2022-06-20
Payer: COMMERCIAL

## 2022-06-20 VITALS
RESPIRATION RATE: 17 BRPM | OXYGEN SATURATION: 97 % | DIASTOLIC BLOOD PRESSURE: 81 MMHG | HEART RATE: 53 BPM | SYSTOLIC BLOOD PRESSURE: 147 MMHG

## 2022-06-20 VITALS
BODY MASS INDEX: 36 KG/M2 | WEIGHT: 277 LBS | OXYGEN SATURATION: 100 % | DIASTOLIC BLOOD PRESSURE: 100 MMHG | TEMPERATURE: 98 F | HEART RATE: 51 BPM | RESPIRATION RATE: 16 BRPM | SYSTOLIC BLOOD PRESSURE: 187 MMHG

## 2022-06-20 DIAGNOSIS — C15.5 MALIGNANT NEOPLASM OF LOWER THIRD OF ESOPHAGUS (HCC): Primary | ICD-10-CM

## 2022-06-20 DIAGNOSIS — C15.5 MALIGNANT NEOPLASM OF LOWER THIRD OF ESOPHAGUS (HCC): ICD-10-CM

## 2022-06-20 LAB
GLUCOSE BLD-MCNC: 93 MG/DL (ref 70–99)
INR: 1.1 (ref 0.8–1.3)

## 2022-06-20 PROCEDURE — 78815 PET IMAGE W/CT SKULL-THIGH: CPT | Performed by: INTERNAL MEDICINE

## 2022-06-20 PROCEDURE — 82962 GLUCOSE BLOOD TEST: CPT

## 2022-06-20 PROCEDURE — 36561 INSERT TUNNELED CV CATH: CPT | Performed by: RADIOLOGY

## 2022-06-20 PROCEDURE — 76937 US GUIDE VASCULAR ACCESS: CPT | Performed by: RADIOLOGY

## 2022-06-20 PROCEDURE — 06H033Z INSERTION OF INFUSION DEVICE INTO INFERIOR VENA CAVA, PERCUTANEOUS APPROACH: ICD-10-PCS | Performed by: RADIOLOGY

## 2022-06-20 PROCEDURE — 77001 FLUOROGUIDE FOR VEIN DEVICE: CPT | Performed by: RADIOLOGY

## 2022-06-20 PROCEDURE — 99152 MOD SED SAME PHYS/QHP 5/>YRS: CPT | Performed by: RADIOLOGY

## 2022-06-20 PROCEDURE — 85610 PROTHROMBIN TIME: CPT | Performed by: RADIOLOGY

## 2022-06-20 PROCEDURE — B548ZZA ULTRASONOGRAPHY OF SUPERIOR VENA CAVA, GUIDANCE: ICD-10-PCS | Performed by: RADIOLOGY

## 2022-06-20 PROCEDURE — 0JH63WZ INSERTION OF TOTALLY IMPLANTABLE VASCULAR ACCESS DEVICE INTO CHEST SUBCUTANEOUS TISSUE AND FASCIA, PERCUTANEOUS APPROACH: ICD-10-PCS | Performed by: RADIOLOGY

## 2022-06-20 PROCEDURE — B5191ZA FLUOROSCOPY OF INFERIOR VENA CAVA USING LOW OSMOLAR CONTRAST, GUIDANCE: ICD-10-PCS | Performed by: RADIOLOGY

## 2022-06-20 RX ORDER — CEFAZOLIN SODIUM/WATER 2 G/20 ML
SYRINGE (ML) INTRAVENOUS
Status: COMPLETED
Start: 2022-06-20 | End: 2022-06-20

## 2022-06-20 RX ORDER — SODIUM CHLORIDE 9 MG/ML
INJECTION, SOLUTION INTRAVENOUS CONTINUOUS
Status: DISCONTINUED | OUTPATIENT
Start: 2022-06-20 | End: 2022-06-20

## 2022-06-20 RX ORDER — HEPARIN SODIUM 5000 [USP'U]/ML
INJECTION, SOLUTION INTRAVENOUS; SUBCUTANEOUS
Status: COMPLETED
Start: 2022-06-20 | End: 2022-06-20

## 2022-06-20 RX ORDER — LIDOCAINE HYDROCHLORIDE AND EPINEPHRINE 10; 10 MG/ML; UG/ML
INJECTION, SOLUTION INFILTRATION; PERINEURAL
Status: COMPLETED
Start: 2022-06-20 | End: 2022-06-20

## 2022-06-20 RX ORDER — LIDOCAINE HYDROCHLORIDE 10 MG/ML
INJECTION, SOLUTION INFILTRATION; PERINEURAL
Status: COMPLETED
Start: 2022-06-20 | End: 2022-06-20

## 2022-06-20 RX ORDER — MIDAZOLAM HYDROCHLORIDE 1 MG/ML
INJECTION INTRAMUSCULAR; INTRAVENOUS
Status: COMPLETED
Start: 2022-06-20 | End: 2022-06-20

## 2022-06-20 NOTE — PLAN OF CARE
Patient had PAC implant today with Dr. Angeles Dear. Left upper chest and neck dressing in place, CDI. VSS. Patient denies any pain. Wife is @ bedside. Patient only able to have water because he has a 1400 PET scan. Discharge instructions reviewed. Patient taken down to [facility] by wheelchair with belongings.

## 2022-06-20 NOTE — PROCEDURES
[facility]  Procedure Note    Minh Nurys.  Patient Status:  Outpatient in a Bed    10/15/1970 MRN [id number]   [facility] [address] [facility] Attending Sravani Wallace MD   Hosp Day # 0 PCP Antonio Stevens MD     Procedure: [facility] placement    Pre-Procedure Diagnosis:  Esophageal carcinoma    Post-Procedure Diagnosis: Same    Anesthesia:  Sedation    Findings:  Patent right internal jugular vein    Specimens: None    Blood Loss:  < 5 cc    Tourniquet Time: None  Complications:  None  Drains:  None    Secondary Diagnosis:  N/A    Nael Coleman MD  2022

## 2022-06-21 ENCOUNTER — MED REC SCAN ONLY (OUTPATIENT)
Dept: FAMILY MEDICINE CLINIC | Facility: CLINIC | Age: 52
End: 2022-06-21

## 2022-06-21 ENCOUNTER — TELEPHONE (OUTPATIENT)
Dept: HEMATOLOGY/ONCOLOGY | Facility: HOSPITAL | Age: 52
End: 2022-06-21

## 2022-06-22 ENCOUNTER — OFFICE VISIT (OUTPATIENT)
Dept: HEMATOLOGY/ONCOLOGY | Age: 52
End: 2022-06-22
Attending: INTERNAL MEDICINE
Payer: COMMERCIAL

## 2022-06-22 DIAGNOSIS — C15.5 MALIGNANT NEOPLASM OF LOWER THIRD OF ESOPHAGUS (HCC): Primary | ICD-10-CM

## 2022-06-22 DIAGNOSIS — Z71.9 ENCOUNTER FOR EDUCATION: ICD-10-CM

## 2022-06-22 PROCEDURE — 99417 PROLNG OP E/M EACH 15 MIN: CPT | Performed by: NURSE PRACTITIONER

## 2022-06-22 PROCEDURE — 99215 OFFICE O/P EST HI 40 MIN: CPT | Performed by: NURSE PRACTITIONER

## 2022-06-22 RX ORDER — PROCHLORPERAZINE MALEATE 10 MG
10 TABLET ORAL EVERY 6 HOURS PRN
Qty: 30 TABLET | Refills: 3 | Status: SHIPPED | OUTPATIENT
Start: 2022-06-22

## 2022-06-22 RX ORDER — ONDANSETRON 8 MG/1
8 TABLET, ORALLY DISINTEGRATING ORAL EVERY 8 HOURS PRN
Qty: 30 TABLET | Refills: 3 | Status: SHIPPED | OUTPATIENT
Start: 2022-06-22

## 2022-06-24 ENCOUNTER — HOSPITAL ENCOUNTER (OUTPATIENT)
Dept: RADIATION ONCOLOGY | Facility: HOSPITAL | Age: 52
Discharge: HOME | End: 2022-06-24
Attending: INTERNAL MEDICINE
Payer: COMMERCIAL

## 2022-06-24 ENCOUNTER — NURSE ONLY (OUTPATIENT)
Dept: HEMATOLOGY/ONCOLOGY | Facility: HOSPITAL | Age: 52
End: 2022-06-24
Attending: INTERNAL MEDICINE
Payer: COMMERCIAL

## 2022-06-24 VITALS
OXYGEN SATURATION: 95 % | SYSTOLIC BLOOD PRESSURE: 160 MMHG | HEIGHT: 74 IN | TEMPERATURE: 97 F | RESPIRATION RATE: 20 BRPM | HEART RATE: 55 BPM | BODY MASS INDEX: 35.81 KG/M2 | WEIGHT: 279 LBS | DIASTOLIC BLOOD PRESSURE: 89 MMHG

## 2022-06-24 DIAGNOSIS — C15.5 MALIGNANT NEOPLASM OF LOWER THIRD OF ESOPHAGUS (HCC): ICD-10-CM

## 2022-06-24 PROCEDURE — 99211 OFF/OP EST MAY X REQ PHY/QHP: CPT

## 2022-06-24 PROCEDURE — 99214 OFFICE O/P EST MOD 30 MIN: CPT

## 2022-06-24 NOTE — PROGRESS NOTES
Patient presented to [facility] area to have initial dressing placed over portacath removed. Removed adhesive dressing and assessed site. No drainage seen and appropriate healing noted. Cleansed area with chloroprep and advised patient that he can resume showers as before. Patient verbalizes understanding of information received.

## 2022-06-24 NOTE — PATIENT INSTRUCTIONS
- CT SIMULATION ON TUESDAY 6/28 AT 11:45 AM AT [address] [facility] (1ST FLOOR) IN [place].    - DO NOT EAT OR DRINK 3 HOURS BEFORE THE CT SIMULATION AND WITH DAILY RADIATION TREATMENTS.     - IF YOU HAVE ANY QUESTIONS OR CONCERNS REGARDING RADIATION THERAPY, PLEASE CALL [phone].

## 2022-07-01 ENCOUNTER — HOSPITAL ENCOUNTER (OUTPATIENT)
Dept: RADIATION ONCOLOGY | Facility: HOSPITAL | Age: 52
Discharge: HOME | End: 2022-07-01
Attending: INTERNAL MEDICINE
Payer: COMMERCIAL

## 2022-07-06 ENCOUNTER — OFFICE VISIT (OUTPATIENT)
Dept: HEMATOLOGY/ONCOLOGY | Facility: HOSPITAL | Age: 52
End: 2022-07-06
Attending: INTERNAL MEDICINE
Payer: COMMERCIAL

## 2022-07-06 ENCOUNTER — HOSPITAL ENCOUNTER (OUTPATIENT)
Dept: RADIATION ONCOLOGY | Facility: HOSPITAL | Age: 52
Discharge: HOME | End: 2022-07-06
Attending: INTERNAL MEDICINE
Payer: COMMERCIAL

## 2022-07-06 ENCOUNTER — SOCIAL WORK SERVICES (OUTPATIENT)
Dept: HEMATOLOGY/ONCOLOGY | Facility: HOSPITAL | Age: 52
End: 2022-07-06

## 2022-07-06 VITALS
HEART RATE: 53 BPM | TEMPERATURE: 97 F | DIASTOLIC BLOOD PRESSURE: 91 MMHG | WEIGHT: 280.38 LBS | HEIGHT: 73.66 IN | SYSTOLIC BLOOD PRESSURE: 182 MMHG | BODY MASS INDEX: 36.37 KG/M2 | RESPIRATION RATE: 18 BRPM | OXYGEN SATURATION: 98 %

## 2022-07-06 DIAGNOSIS — C15.5 MALIGNANT NEOPLASM OF LOWER THIRD OF ESOPHAGUS (HCC): Primary | ICD-10-CM

## 2022-07-06 LAB
ALBUMIN SERPL-MCNC: 3.8 G/DL (ref 3.4–5)
ALBUMIN/GLOB SERPL: 1.2 {RATIO} (ref 1–2)
ALP LIVER SERPL-CCNC: 91 U/L
ALT SERPL-CCNC: 27 U/L
ANION GAP SERPL CALC-SCNC: 4 MMOL/L (ref 0–18)
AST SERPL-CCNC: 24 U/L (ref 15–37)
BASOPHILS # BLD AUTO: 0.04 X10(3) UL (ref 0–0.2)
BASOPHILS NFR BLD AUTO: 0.6 %
BILIRUB SERPL-MCNC: 0.4 MG/DL (ref 0.1–2)
BUN BLD-MCNC: 11 MG/DL (ref 7–18)
CALCIUM BLD-MCNC: 9.3 MG/DL (ref 8.5–10.1)
CANCER AG19-9 SERPL-ACNC: 137.9 U/ML (ref ?–37)
CHLORIDE SERPL-SCNC: 108 MMOL/L (ref 98–112)
CO2 SERPL-SCNC: 29 MMOL/L (ref 21–32)
CREAT BLD-MCNC: 0.86 MG/DL
EOSINOPHIL # BLD AUTO: 0.19 X10(3) UL (ref 0–0.7)
EOSINOPHIL NFR BLD AUTO: 2.9 %
ERYTHROCYTE [DISTWIDTH] IN BLOOD BY AUTOMATED COUNT: 12 %
FASTING STATUS PATIENT QL REPORTED: NO
GLOBULIN PLAS-MCNC: 3.2 G/DL (ref 2.8–4.4)
GLUCOSE BLD-MCNC: 96 MG/DL (ref 70–99)
HCT VFR BLD AUTO: 40.4 %
HGB BLD-MCNC: 13.5 G/DL
IMM GRANULOCYTES # BLD AUTO: 0.06 X10(3) UL (ref 0–1)
IMM GRANULOCYTES NFR BLD: 0.9 %
LYMPHOCYTES # BLD AUTO: 1.1 X10(3) UL (ref 1–4)
LYMPHOCYTES NFR BLD AUTO: 17 %
MCH RBC QN AUTO: 31 PG (ref 26–34)
MCHC RBC AUTO-ENTMCNC: 33.4 G/DL (ref 31–37)
MCV RBC AUTO: 92.9 FL
MONOCYTES # BLD AUTO: 0.56 X10(3) UL (ref 0.1–1)
MONOCYTES NFR BLD AUTO: 8.7 %
NEUTROPHILS # BLD AUTO: 4.52 X10 (3) UL (ref 1.5–7.7)
NEUTROPHILS # BLD AUTO: 4.52 X10(3) UL (ref 1.5–7.7)
NEUTROPHILS NFR BLD AUTO: 69.9 %
OSMOLALITY SERPL CALC.SUM OF ELEC: 291 MOSM/KG (ref 275–295)
PLATELET # BLD AUTO: 212 10(3)UL (ref 150–450)
POTASSIUM SERPL-SCNC: 4.3 MMOL/L (ref 3.5–5.1)
PROT SERPL-MCNC: 7 G/DL (ref 6.4–8.2)
RBC # BLD AUTO: 4.35 X10(6)UL
SODIUM SERPL-SCNC: 141 MMOL/L (ref 136–145)
WBC # BLD AUTO: 6.5 X10(3) UL (ref 4–11)

## 2022-07-06 PROCEDURE — 99214 OFFICE O/P EST MOD 30 MIN: CPT | Performed by: INTERNAL MEDICINE

## 2022-07-06 PROCEDURE — 77386 HC IMRT COMPLEX: CPT | Performed by: INTERNAL MEDICINE

## 2022-07-06 PROCEDURE — 96375 TX/PRO/DX INJ NEW DRUG ADDON: CPT

## 2022-07-06 PROCEDURE — S0028 INJECTION, FAMOTIDINE, 20 MG: HCPCS | Performed by: INTERNAL MEDICINE

## 2022-07-06 PROCEDURE — 80053 COMPREHEN METABOLIC PANEL: CPT

## 2022-07-06 PROCEDURE — 77338 DESIGN MLC DEVICE FOR IMRT: CPT | Performed by: INTERNAL MEDICINE

## 2022-07-06 PROCEDURE — 96413 CHEMO IV INFUSION 1 HR: CPT

## 2022-07-06 PROCEDURE — 77300 RADIATION THERAPY DOSE PLAN: CPT | Performed by: INTERNAL MEDICINE

## 2022-07-06 PROCEDURE — 85025 COMPLETE CBC W/AUTO DIFF WBC: CPT

## 2022-07-06 PROCEDURE — 86301 IMMUNOASSAY TUMOR CA 19-9: CPT

## 2022-07-06 PROCEDURE — 96417 CHEMO IV INFUS EACH ADDL SEQ: CPT

## 2022-07-06 RX ORDER — DIPHENHYDRAMINE HYDROCHLORIDE 50 MG/ML
50 INJECTION INTRAMUSCULAR; INTRAVENOUS ONCE
Status: CANCELLED | OUTPATIENT
Start: 2022-07-06

## 2022-07-06 RX ORDER — BISMUTH SUBSALICYLATE 262 MG/1
TABLET, CHEWABLE ORAL
COMMUNITY

## 2022-07-06 RX ORDER — FAMOTIDINE 10 MG/ML
20 INJECTION, SOLUTION INTRAVENOUS ONCE
Status: CANCELLED | OUTPATIENT
Start: 2022-07-06

## 2022-07-06 RX ORDER — CETIRIZINE HYDROCHLORIDE 10 MG/1
10 TABLET ORAL DAILY
COMMUNITY

## 2022-07-06 RX ORDER — FAMOTIDINE 10 MG/ML
20 INJECTION, SOLUTION INTRAVENOUS ONCE
Status: COMPLETED | OUTPATIENT
Start: 2022-07-06 | End: 2022-07-06

## 2022-07-06 RX ORDER — DIPHENHYDRAMINE HYDROCHLORIDE 50 MG/ML
50 INJECTION INTRAMUSCULAR; INTRAVENOUS ONCE
Status: COMPLETED | OUTPATIENT
Start: 2022-07-06 | End: 2022-07-06

## 2022-07-06 RX ADMIN — DIPHENHYDRAMINE HYDROCHLORIDE 50 MG: 50 INJECTION INTRAMUSCULAR; INTRAVENOUS at 10:27:00

## 2022-07-06 RX ADMIN — FAMOTIDINE 20 MG: 10 INJECTION, SOLUTION INTRAVENOUS at 10:25:00

## 2022-07-06 NOTE — PROGRESS NOTES
Pt here for C1D1. Arrives Ambulating independently, accompanied by Spouse           Pregnancy screening: Not applicable    Modifications in dose or schedule: No    Drugs/infusions dual verified for appearance and physical integrity: yes     Frequency of blood return and site check throughout administration: Prior to administration   Discharged to Home, Ambulating independently, accompanied by:Spouse    Outpatient Oncology Care Plan  Problem list:  knowledge deficit  Problems related to:  side effect of treatment  Interventions:  nausea/vomiting teaching  chemotherapy teaching  monitor effect of therapy  Expected outcomes:  understands plan of care  Progress towards outcome:  making progress    Education Record    Learner:  Patient and Spouse  Barriers / Limitations:  None  Method:  Discussion and Printed material  Outcome:  Shows understanding  Comments:    Pt here for first day of taxol/carbo. Labs drawn before. Seen by MD. Consent signed. Starting daily radiation as well today after treatment. Starting next week will be trying to get labs day before treatment to save time on treatment day. Appts requested for labs next Tuesday and then MD/chemo on Wednesday. Tolerating well. Seen by SW and dietitian while in treatment area.

## 2022-07-06 NOTE — PROGRESS NOTES
Chapo padron and aware to add on PDL1 testing. Fax sent to [employer] at ext [phone] with [address]. Asked for call back to confirm this was received.

## 2022-07-06 NOTE — PROGRESS NOTES
Pt here for C1 D1 Taxol/Carbo. Pt continues to need schedules and PRN meds for reflux. No new complaints this visit. Port in place.

## 2022-07-06 NOTE — PATIENT INSTRUCTIONS
Anti-Nausea Medication:    Ondansetron (Zofran) -- can take every 8 hours as needed for nausea. Prochlorperazine (Compazine) -- can take every 6 hours as needed for nausea. To Alternate: Can take Compazine 4 hours after IV Zofran today. Then 4 hours later can take oral Zofran again, 4 hours later compazine, etc.     IV Zofran today @______10:30_____    Oral Compazine @_____before dinner_____    Oral Zofran @___before bed_____    You do not need to wake yourself up to take anything. Start up again in the morning. Alternate every 4 hours like this for next 2-3 days.      Zofran can be constipating

## 2022-07-06 NOTE — PROGRESS NOTES
met with patient and Life Partner Katja Iqbal in treatment room for introduction and role explanation. Patient scored 8 on Distress Screening in regards to Practical, Emotional, Physical, and Social. Patient appears to be joking and in good spirits. He stated that they do not really talk about the diagnosis, and keeps distracted, but he does have some anxiety and fear in regards to treatment. Support and encouragement provided. Patient and Wife have 5 children between them, who are supportive and present if needed. Patient works for [employer] as a , but is on kooldiner for his Shoulders; states that [employer] in unaware of his Cancer Diagnosis at this time. Life Partner works as a Banker; employer is aware but she is ineligible for FMLA due to marital status; offered letter to have on file if needed. Educated on FMLA for children if needed as well.  assisted in completing Power of  for Healthcare where patient named Troy Garcia as Primary Agent; copies made and handed to family and given to BRADEN FUNK \[facility] [place] [facility]\"" Angie to upload into their chart. Educated on available resources, providing Social Work Support Packet including [address], [employer], and 1 [employer] contacts. Contact provided and family is aware to reach out with any needs. Bringing Adriane Bag given, which patient was grateful for.

## 2022-07-07 ENCOUNTER — TELEPHONE (OUTPATIENT)
Dept: HEMATOLOGY/ONCOLOGY | Facility: HOSPITAL | Age: 52
End: 2022-07-07

## 2022-07-07 PROCEDURE — 77386 HC IMRT COMPLEX: CPT | Performed by: INTERNAL MEDICINE

## 2022-07-07 NOTE — TELEPHONE ENCOUNTER
Returned call no complaints or concerns at this time. Reinforced plan and to call for questions or issues. He verbalizes understanding.

## 2022-07-08 ENCOUNTER — HOSPITAL ENCOUNTER (OUTPATIENT)
Dept: RADIATION ONCOLOGY | Facility: HOSPITAL | Age: 52
Discharge: HOME | End: 2022-07-08
Attending: INTERNAL MEDICINE
Payer: COMMERCIAL

## 2022-07-08 VITALS
TEMPERATURE: 97 F | WEIGHT: 282.38 LBS | RESPIRATION RATE: 20 BRPM | HEART RATE: 55 BPM | OXYGEN SATURATION: 96 % | BODY MASS INDEX: 37 KG/M2 | SYSTOLIC BLOOD PRESSURE: 157 MMHG | DIASTOLIC BLOOD PRESSURE: 91 MMHG

## 2022-07-08 DIAGNOSIS — C15.5 MALIGNANT NEOPLASM OF LOWER THIRD OF ESOPHAGUS (HCC): Primary | ICD-10-CM

## 2022-07-08 PROCEDURE — 77386 HC IMRT COMPLEX: CPT | Performed by: INTERNAL MEDICINE

## 2022-07-08 NOTE — PROGRESS NOTES
[facility] Radiation Treatment Management Note 1-5    Patient:  Efren Dutton. Age:  45year old  Visit Diagnosis:    1. Malignant neoplasm of lower third of esophagus (HCC)      Primary Rad/Onc:  Dr. Yony Jimenez    Site Delivered Dose (cGy) Prescribed Dose (cGy) Fraction #   ESOPHAGUS +  4140 3/23   ESOPH + LN BOOST 0 900 0/5     First treatment date:   7/6/22  Concurrent chemotherapy:  WEEKLY CARBO/TAXOL    Oncology Vitals 6/24/2022 7/6/2022 7/8/2022   Height 6' 2\" 6' 1.661\" -   Height 188 cm 187 cm -   Weight 279 lb 280 lb 6 oz 282 lb 6.4 oz   Weight 126.554 kg 127.177 kg 128.096 kg   BSA (m2) 2.5 m2 2.5 m2 2.51 m2   /89 182/91 157/91   Pulse 55 53 55   Resp 20 18 20   Temp 96.7 97.3 96.6   SpO2 95 98 96   Pain Score 0 0 0   Some recent data might be hidden        Toxicities:  Fatigue Grade 0= None  Nausea Grade 0= None  Vomiting Grade 0= None  Flatulence Grade 0= None  Abdominal pain Grade 0= None    Nursing Note:  Recent Labs   Lab 07/06/22  0839   RBC 4.35   HGB 13.5   HCT 40.4   MCV 92.9   MCH 31.0   MCHC 33.4   RDW 12.0   NEPRELIM 4.52   WBC 6.5   .0     RN ED done with pt. Reviewed potential side effects of RT and management. Getting weekly chemo, tolerated 1st cycle well. Denies early satiety, nausea or esophagitis. Denies heartburn; taking Pantoprazole BID. Appetite good. Met with dietitian; weight steady. Libia Sullivan, RN    Physician Note:  Subjective:  Doing well        Objective:  Weight stable      Treatment setup imaging have been reviewed:   Yes    Assessment/Plan:        Continue radiotherapy per plan    Next visit:  1 week    Dr. Hudson Mcelroy

## 2022-07-11 PROCEDURE — 77386 HC IMRT COMPLEX: CPT | Performed by: INTERNAL MEDICINE

## 2022-07-12 ENCOUNTER — NURSE ONLY (OUTPATIENT)
Dept: HEMATOLOGY/ONCOLOGY | Facility: HOSPITAL | Age: 52
End: 2022-07-12
Attending: INTERNAL MEDICINE
Payer: COMMERCIAL

## 2022-07-12 VITALS
HEART RATE: 63 BPM | SYSTOLIC BLOOD PRESSURE: 165 MMHG | TEMPERATURE: 97 F | DIASTOLIC BLOOD PRESSURE: 98 MMHG | RESPIRATION RATE: 16 BRPM | OXYGEN SATURATION: 98 %

## 2022-07-12 DIAGNOSIS — C15.5 MALIGNANT NEOPLASM OF LOWER THIRD OF ESOPHAGUS (HCC): Primary | ICD-10-CM

## 2022-07-12 LAB
ANION GAP SERPL CALC-SCNC: 5 MMOL/L (ref 0–18)
BASOPHILS # BLD AUTO: 0.04 X10(3) UL (ref 0–0.2)
BASOPHILS NFR BLD AUTO: 0.8 %
BUN BLD-MCNC: 15 MG/DL (ref 7–18)
CALCIUM BLD-MCNC: 9.2 MG/DL (ref 8.5–10.1)
CANCER AG19-9 SERPL-ACNC: 202.1 U/ML (ref ?–37)
CHLORIDE SERPL-SCNC: 105 MMOL/L (ref 98–112)
CO2 SERPL-SCNC: 27 MMOL/L (ref 21–32)
CREAT BLD-MCNC: 0.79 MG/DL
EOSINOPHIL # BLD AUTO: 0.12 X10(3) UL (ref 0–0.7)
EOSINOPHIL NFR BLD AUTO: 2.4 %
ERYTHROCYTE [DISTWIDTH] IN BLOOD BY AUTOMATED COUNT: 11.9 %
FASTING STATUS PATIENT QL REPORTED: NO
GLUCOSE BLD-MCNC: 97 MG/DL (ref 70–99)
HCT VFR BLD AUTO: 41.8 %
HGB BLD-MCNC: 13.9 G/DL
IMM GRANULOCYTES # BLD AUTO: 0.12 X10(3) UL (ref 0–1)
IMM GRANULOCYTES NFR BLD: 2.4 %
LYMPHOCYTES # BLD AUTO: 0.76 X10(3) UL (ref 1–4)
LYMPHOCYTES NFR BLD AUTO: 14.9 %
MCH RBC QN AUTO: 30.7 PG (ref 26–34)
MCHC RBC AUTO-ENTMCNC: 33.3 G/DL (ref 31–37)
MCV RBC AUTO: 92.3 FL
MONOCYTES # BLD AUTO: 0.39 X10(3) UL (ref 0.1–1)
MONOCYTES NFR BLD AUTO: 7.6 %
NEUTROPHILS # BLD AUTO: 3.67 X10 (3) UL (ref 1.5–7.7)
NEUTROPHILS # BLD AUTO: 3.67 X10(3) UL (ref 1.5–7.7)
NEUTROPHILS NFR BLD AUTO: 71.9 %
OSMOLALITY SERPL CALC.SUM OF ELEC: 285 MOSM/KG (ref 275–295)
PLATELET # BLD AUTO: 199 10(3)UL (ref 150–450)
POTASSIUM SERPL-SCNC: 4.4 MMOL/L (ref 3.5–5.1)
RBC # BLD AUTO: 4.53 X10(6)UL
SODIUM SERPL-SCNC: 137 MMOL/L (ref 136–145)
WBC # BLD AUTO: 5.1 X10(3) UL (ref 4–11)

## 2022-07-12 PROCEDURE — 86301 IMMUNOASSAY TUMOR CA 19-9: CPT

## 2022-07-12 PROCEDURE — 77386 HC IMRT COMPLEX: CPT | Performed by: INTERNAL MEDICINE

## 2022-07-12 PROCEDURE — 80048 BASIC METABOLIC PNL TOTAL CA: CPT

## 2022-07-12 PROCEDURE — 36591 DRAW BLOOD OFF VENOUS DEVICE: CPT

## 2022-07-12 PROCEDURE — 85025 COMPLETE CBC W/AUTO DIFF WBC: CPT

## 2022-07-13 ENCOUNTER — OFFICE VISIT (OUTPATIENT)
Dept: HEMATOLOGY/ONCOLOGY | Facility: HOSPITAL | Age: 52
End: 2022-07-13
Attending: INTERNAL MEDICINE
Payer: COMMERCIAL

## 2022-07-13 VITALS
WEIGHT: 277.38 LBS | OXYGEN SATURATION: 98 % | DIASTOLIC BLOOD PRESSURE: 86 MMHG | TEMPERATURE: 98 F | RESPIRATION RATE: 16 BRPM | HEART RATE: 65 BPM | BODY MASS INDEX: 36 KG/M2 | SYSTOLIC BLOOD PRESSURE: 136 MMHG

## 2022-07-13 DIAGNOSIS — C15.5 MALIGNANT NEOPLASM OF LOWER THIRD OF ESOPHAGUS (HCC): Primary | ICD-10-CM

## 2022-07-13 PROCEDURE — 99213 OFFICE O/P EST LOW 20 MIN: CPT | Performed by: INTERNAL MEDICINE

## 2022-07-13 PROCEDURE — 77386 HC IMRT COMPLEX: CPT | Performed by: INTERNAL MEDICINE

## 2022-07-13 PROCEDURE — 96375 TX/PRO/DX INJ NEW DRUG ADDON: CPT

## 2022-07-13 PROCEDURE — S0028 INJECTION, FAMOTIDINE, 20 MG: HCPCS | Performed by: INTERNAL MEDICINE

## 2022-07-13 PROCEDURE — 96417 CHEMO IV INFUS EACH ADDL SEQ: CPT

## 2022-07-13 PROCEDURE — 96413 CHEMO IV INFUSION 1 HR: CPT

## 2022-07-13 RX ORDER — FAMOTIDINE 10 MG/ML
20 INJECTION, SOLUTION INTRAVENOUS ONCE
Status: COMPLETED | OUTPATIENT
Start: 2022-07-13 | End: 2022-07-13

## 2022-07-13 RX ORDER — FAMOTIDINE 10 MG/ML
20 INJECTION, SOLUTION INTRAVENOUS ONCE
Status: CANCELLED | OUTPATIENT
Start: 2022-07-13

## 2022-07-13 RX ORDER — DIPHENHYDRAMINE HYDROCHLORIDE 50 MG/ML
50 INJECTION INTRAMUSCULAR; INTRAVENOUS ONCE
Status: COMPLETED | OUTPATIENT
Start: 2022-07-13 | End: 2022-07-13

## 2022-07-13 RX ORDER — DIPHENHYDRAMINE HYDROCHLORIDE 50 MG/ML
50 INJECTION INTRAMUSCULAR; INTRAVENOUS ONCE
Status: CANCELLED | OUTPATIENT
Start: 2022-07-13

## 2022-07-13 RX ADMIN — FAMOTIDINE 20 MG: 10 INJECTION, SOLUTION INTRAVENOUS at 11:15:00

## 2022-07-13 RX ADMIN — DIPHENHYDRAMINE HYDROCHLORIDE 50 MG: 50 INJECTION INTRAMUSCULAR; INTRAVENOUS at 11:39:00

## 2022-07-13 NOTE — PROGRESS NOTES
Pt here for C1D8. Arrives Ambulating independently, accompanied by Self           Pregnancy screening: Not applicable    Modifications in dose or schedule: No    Drugs/infusions dual verified for appearance and physical integrity: yes     Frequency of blood return and site check throughout administration: Prior to administration, Prior to each drug and At completion of therapy   Discharged to Home, Ambulating independently, accompanied by:Self    Outpatient Oncology Care Plan  Problem list:  fatigue  Problems related to:  chemotherapy  side effect of treatment  therapeutic effects  Interventions:  chemotherapy teaching  encourage activity as tolerated  monitor lab values  promoted rest  provided general teaching  Expected outcomes:  adequate sleep/rest  optimal lab values  understands plan of care  Progress towards outcome:  making progress    Education Record    Learner:  Patient  Barriers / Limitations:  None  Method:  Discussion  Outcome:  Shows understanding  Comments: Pt tolerated inf well. Will continue to monitor.

## 2022-07-13 NOTE — PROGRESS NOTES
Outpatient Oncology Care Plan   Problem list:   fatigue   Problems related to:   chemotherapy   Interventions:   provided general teaching   Expected outcomes:   understands plan of care   Progress towards outcome: making progress     Education Record   Learner: Patient   Barriers / Limitations: None   Method: Discussion   Outcome: Shows understanding   Comments:  Patient here for follow-up and planned C1D8 treatment. Had some fatigue over the weekend. Had one episode of nausea that was resolved with Zofran. States radiation is going well. Denies any additional symptoms at this time.

## 2022-07-14 PROCEDURE — 77386 HC IMRT COMPLEX: CPT | Performed by: INTERNAL MEDICINE

## 2022-07-15 ENCOUNTER — HOSPITAL ENCOUNTER (OUTPATIENT)
Dept: RADIATION ONCOLOGY | Facility: HOSPITAL | Age: 52
Discharge: HOME | End: 2022-07-15
Attending: INTERNAL MEDICINE
Payer: COMMERCIAL

## 2022-07-15 VITALS
HEART RATE: 55 BPM | RESPIRATION RATE: 20 BRPM | OXYGEN SATURATION: 97 % | SYSTOLIC BLOOD PRESSURE: 166 MMHG | BODY MASS INDEX: 36 KG/M2 | DIASTOLIC BLOOD PRESSURE: 92 MMHG | WEIGHT: 281 LBS | TEMPERATURE: 97 F

## 2022-07-15 DIAGNOSIS — C15.5 MALIGNANT NEOPLASM OF LOWER THIRD OF ESOPHAGUS (HCC): Primary | ICD-10-CM

## 2022-07-15 PROCEDURE — 77336 RADIATION PHYSICS CONSULT: CPT | Performed by: INTERNAL MEDICINE

## 2022-07-15 PROCEDURE — 77386 HC IMRT COMPLEX: CPT | Performed by: INTERNAL MEDICINE

## 2022-07-15 NOTE — PROGRESS NOTES
[facility] Radiation Treatment Management Note 6-10    Patient:  Liz Nino. Age:  45year old  Visit Diagnosis:  Adeno distal esophagus, cT3 N1  Primary Rad/Onc:  Dr. India Parker    Site Delivered Dose (cGy) Prescribed Dose (cGy) Fraction #   ESOPHAGUS + LN 1440 4140 8/23   ESOPH + LN BOOST 0 900 0/5     First treatment date:   7/6/22  Concurrent chemotherapy:  WEEKLY CARBO/TAXOL    Oncology Vitals 7/12/2022 7/13/2022 7/15/2022   Weight - 277 lb 6.4 oz 281 lb   Weight - 125.828 kg 127.461 kg   BSA (m2) - 2.49 m2 2.5 m2   /98 136/86 166/92   Pulse - 65 55   Resp - 16 20   Temp - 98.3 97   SpO2 - 98 97   Pain Score - 0 0   Some recent data might be hidden      Wt Readings from Last 6 Encounters:  07/15/22 : 127.5 kg (281 lb)  07/13/22 : 125.8 kg (277 lb 6.4 oz)  07/08/22 : 128.1 kg (282 lb 6.4 oz)  07/06/22 : 127.2 kg (280 lb 6 oz)  06/24/22 : 126.6 kg (279 lb)  06/20/22 : 125.6 kg (277 lb)    Toxicities:  Fatigue Grade 1= Fatigue relieved by rest  Nausea Grade 0= None  Vomiting Grade 0= None  Flatulence Grade 0= None  Abdominal pain Grade 0= None    Nursing Note:  Recent Labs   Lab 07/12/22  1128   RBC 4.53   HGB 13.9   HCT 41.8   MCV 92.3   MCH 30.7   MCHC 33.3   RDW 11.9   NEPRELIM 3.67   WBC 5.1   .0     Pt feels well. Appetite good, no nausea. Denies esophagitis or heartburn. States still burping a lot, no meds. Hardik Mart, RN    Physician Note:  Subjective:  -doing well, no changes in swallowing, some increased burping but on pantoprazole bid   Objective:  Vitals noted, weight essentially stable  ECOG 0  NAD      Treatment setup imaging have been reviewed:  Yes    Labs above    Assessment/Plan:  -Tolerating treatment, cont CRT per plan. -Cont PPI BID, discussed GERD trigging foods.  -labs per medonc    We discussed expected future toxicity. All questions answered.   Next OTV 1 week    India Parker MD

## 2022-07-18 PROCEDURE — 77386 HC IMRT COMPLEX: CPT | Performed by: INTERNAL MEDICINE

## 2022-07-19 ENCOUNTER — NURSE ONLY (OUTPATIENT)
Dept: HEMATOLOGY/ONCOLOGY | Facility: HOSPITAL | Age: 52
End: 2022-07-19
Attending: INTERNAL MEDICINE
Payer: COMMERCIAL

## 2022-07-19 DIAGNOSIS — C15.5 MALIGNANT NEOPLASM OF LOWER THIRD OF ESOPHAGUS (HCC): Primary | ICD-10-CM

## 2022-07-19 LAB
ANION GAP SERPL CALC-SCNC: 5 MMOL/L (ref 0–18)
BASOPHILS # BLD AUTO: 0.01 X10(3) UL (ref 0–0.2)
BASOPHILS NFR BLD AUTO: 0.2 %
BUN BLD-MCNC: 9 MG/DL (ref 7–18)
CALCIUM BLD-MCNC: 9.2 MG/DL (ref 8.5–10.1)
CANCER AG19-9 SERPL-ACNC: 182.2 U/ML (ref ?–37)
CHLORIDE SERPL-SCNC: 105 MMOL/L (ref 98–112)
CO2 SERPL-SCNC: 25 MMOL/L (ref 21–32)
CREAT BLD-MCNC: 0.84 MG/DL
EOSINOPHIL # BLD AUTO: 0.07 X10(3) UL (ref 0–0.7)
EOSINOPHIL NFR BLD AUTO: 1.6 %
ERYTHROCYTE [DISTWIDTH] IN BLOOD BY AUTOMATED COUNT: 12 %
FASTING STATUS PATIENT QL REPORTED: NO
GLUCOSE BLD-MCNC: 120 MG/DL (ref 70–99)
HCT VFR BLD AUTO: 40.6 %
HGB BLD-MCNC: 13.3 G/DL
IMM GRANULOCYTES # BLD AUTO: 0.08 X10(3) UL (ref 0–1)
IMM GRANULOCYTES NFR BLD: 1.9 %
LYMPHOCYTES # BLD AUTO: 0.34 X10(3) UL (ref 1–4)
LYMPHOCYTES NFR BLD AUTO: 8 %
MCH RBC QN AUTO: 30.9 PG (ref 26–34)
MCHC RBC AUTO-ENTMCNC: 32.8 G/DL (ref 31–37)
MCV RBC AUTO: 94.2 FL
MONOCYTES # BLD AUTO: 0.36 X10(3) UL (ref 0.1–1)
MONOCYTES NFR BLD AUTO: 8.4 %
NEUTROPHILS # BLD AUTO: 3.41 X10 (3) UL (ref 1.5–7.7)
NEUTROPHILS # BLD AUTO: 3.41 X10(3) UL (ref 1.5–7.7)
NEUTROPHILS NFR BLD AUTO: 79.9 %
OSMOLALITY SERPL CALC.SUM OF ELEC: 280 MOSM/KG (ref 275–295)
PLATELET # BLD AUTO: 199 10(3)UL (ref 150–450)
POTASSIUM SERPL-SCNC: 4.2 MMOL/L (ref 3.5–5.1)
RBC # BLD AUTO: 4.31 X10(6)UL
SODIUM SERPL-SCNC: 135 MMOL/L (ref 136–145)
WBC # BLD AUTO: 4.3 X10(3) UL (ref 4–11)

## 2022-07-19 PROCEDURE — 36591 DRAW BLOOD OFF VENOUS DEVICE: CPT

## 2022-07-19 PROCEDURE — 80048 BASIC METABOLIC PNL TOTAL CA: CPT

## 2022-07-19 PROCEDURE — 86301 IMMUNOASSAY TUMOR CA 19-9: CPT

## 2022-07-19 PROCEDURE — 85025 COMPLETE CBC W/AUTO DIFF WBC: CPT

## 2022-07-19 PROCEDURE — 77386 HC IMRT COMPLEX: CPT | Performed by: INTERNAL MEDICINE

## 2022-07-19 RX ORDER — FAMOTIDINE 10 MG/ML
20 INJECTION, SOLUTION INTRAVENOUS ONCE
Status: CANCELLED | OUTPATIENT
Start: 2022-07-20

## 2022-07-19 RX ORDER — DIPHENHYDRAMINE HYDROCHLORIDE 50 MG/ML
50 INJECTION INTRAMUSCULAR; INTRAVENOUS ONCE
Status: CANCELLED | OUTPATIENT
Start: 2022-07-20

## 2022-07-19 NOTE — PROGRESS NOTES
Education Record    Learner:  Patient    Disease / Diagnosis: Esophageal Carcinoma    Barriers / Limitations:  None   Comments:    Method:  Brief focused   Comments:    General Topics:  Plan of care reviewed   Comments:    Outcome:  Shows understanding   Comments:

## 2022-07-20 ENCOUNTER — OFFICE VISIT (OUTPATIENT)
Dept: HEMATOLOGY/ONCOLOGY | Facility: HOSPITAL | Age: 52
End: 2022-07-20
Attending: INTERNAL MEDICINE
Payer: COMMERCIAL

## 2022-07-20 VITALS
BODY MASS INDEX: 35.68 KG/M2 | WEIGHT: 278 LBS | HEART RATE: 61 BPM | TEMPERATURE: 97 F | DIASTOLIC BLOOD PRESSURE: 89 MMHG | OXYGEN SATURATION: 100 % | HEIGHT: 73.9 IN | RESPIRATION RATE: 16 BRPM | SYSTOLIC BLOOD PRESSURE: 156 MMHG

## 2022-07-20 DIAGNOSIS — C15.5 MALIGNANT NEOPLASM OF LOWER THIRD OF ESOPHAGUS (HCC): Primary | ICD-10-CM

## 2022-07-20 PROCEDURE — 96413 CHEMO IV INFUSION 1 HR: CPT

## 2022-07-20 PROCEDURE — 96417 CHEMO IV INFUS EACH ADDL SEQ: CPT

## 2022-07-20 PROCEDURE — 77386 HC IMRT COMPLEX: CPT | Performed by: INTERNAL MEDICINE

## 2022-07-20 PROCEDURE — S0028 INJECTION, FAMOTIDINE, 20 MG: HCPCS | Performed by: INTERNAL MEDICINE

## 2022-07-20 PROCEDURE — 99213 OFFICE O/P EST LOW 20 MIN: CPT | Performed by: INTERNAL MEDICINE

## 2022-07-20 PROCEDURE — 96375 TX/PRO/DX INJ NEW DRUG ADDON: CPT

## 2022-07-20 RX ORDER — DIPHENHYDRAMINE HYDROCHLORIDE 50 MG/ML
50 INJECTION INTRAMUSCULAR; INTRAVENOUS ONCE
Status: COMPLETED | OUTPATIENT
Start: 2022-07-20 | End: 2022-07-20

## 2022-07-20 RX ORDER — FAMOTIDINE 10 MG/ML
20 INJECTION, SOLUTION INTRAVENOUS ONCE
Status: COMPLETED | OUTPATIENT
Start: 2022-07-20 | End: 2022-07-20

## 2022-07-20 RX ADMIN — FAMOTIDINE 20 MG: 10 INJECTION, SOLUTION INTRAVENOUS at 09:48:00

## 2022-07-20 RX ADMIN — DIPHENHYDRAMINE HYDROCHLORIDE 50 MG: 50 INJECTION INTRAMUSCULAR; INTRAVENOUS at 09:45:00

## 2022-07-20 NOTE — PROGRESS NOTES
Outpatient Oncology Care Plan   Problem list:   fatigue   Problems related to:   chemotherapy   Interventions:   provided general teaching   Expected outcomes:   understands plan of care   Progress towards outcome: making progress     Education Record   Learner: Patient   Barriers / Limitations: None   Method: Discussion   Outcome: Shows understanding   Comments:  Patient here for follow-up and planned C1D15 treatment. He is experiencing more fatigue than usual. Having more difficulty with swallowing solids. Had an episode of nausea, but believes it is related to lactose.

## 2022-07-21 PROCEDURE — 77386 HC IMRT COMPLEX: CPT | Performed by: INTERNAL MEDICINE

## 2022-07-22 ENCOUNTER — HOSPITAL ENCOUNTER (OUTPATIENT)
Dept: RADIATION ONCOLOGY | Facility: HOSPITAL | Age: 52
Discharge: HOME | End: 2022-07-22
Attending: INTERNAL MEDICINE
Payer: COMMERCIAL

## 2022-07-22 VITALS
OXYGEN SATURATION: 97 % | BODY MASS INDEX: 36 KG/M2 | WEIGHT: 280 LBS | SYSTOLIC BLOOD PRESSURE: 156 MMHG | DIASTOLIC BLOOD PRESSURE: 91 MMHG | TEMPERATURE: 97 F | RESPIRATION RATE: 16 BRPM | HEART RATE: 57 BPM

## 2022-07-22 DIAGNOSIS — C15.5 MALIGNANT NEOPLASM OF LOWER THIRD OF ESOPHAGUS (HCC): Primary | ICD-10-CM

## 2022-07-22 PROCEDURE — 77336 RADIATION PHYSICS CONSULT: CPT | Performed by: INTERNAL MEDICINE

## 2022-07-22 PROCEDURE — 77386 HC IMRT COMPLEX: CPT | Performed by: INTERNAL MEDICINE

## 2022-07-22 NOTE — PROGRESS NOTES
[facility] Radiation Treatment Management Note 11-15    Patient:  Efren Dutton. Age:  45year old  Visit Diagnosis:  Adeno distal esophagus, cT3 N1  Primary Rad/Onc:  Dr. Yony Jimenez    Site Delivered Dose (cGy) Prescribed Dose (cGy) Fraction #   ESOPHAGUS + LN 2340 4140 13/23   ESOPH + LN BOOST 0 900 0/5     First treatment date:   7/6/22  Concurrent chemotherapy:  WEEKLY CARBO/TAXOL (MA)    Oncology Vitals 7/15/2022 7/20/2022 7/22/2022   Height - 6' 1.898\" -   Height - 188 cm -   Weight 281 lb 278 lb 280 lb   Weight 127.461 kg 126.1 kg 127.007 kg   BSA (m2) 2.5 m2 2.5 m2 2.5 m2   /92 156/89 156/91   Pulse 55 61 57   Resp 20 16 16   Temp 97 97.3 97.1   SpO2 97 100 97   Pain Score 0 0 0   Some recent data might be hidden        Toxicities:  Fatigue Grade 1= Fatigue relieved by rest  Nausea Grade 0= None  Vomiting Grade 0= None  Flatulence Grade 0= None  Abdominal pain Grade 1= Mild pain    Nursing Note:  Recent Labs   Lab 07/19/22  1028   RBC 4.31   HGB 13.3   HCT 40.6   MCV 94.2   MCH 30.9   MCHC 32.8   RDW 12.0   NEPRELIM 3.41   WBC 4.3   .0     C/O abdominal pain and bloating last night. Took Zofran, got relief. No pain/bloating now, denies nausea. Appetite fair, denies dysphagia or esophagitis. Libia Sullivan RN    Physician Note:  Subjective:  -doing well, no complaints      Objective:  Vitals noted  ECOG 0  NAD  Lungs CTAB      Treatment setup imaging have been reviewed: Yes    Assessment/Plan:  -Tolerating treatment, cont CRT per plan. -Cont PPI BID, discussed GERD trigging foods.  -labs per [employer]  We discussed expected future toxicity. All questions answered.   Next OTV 1 week  Ramya Webb MD

## 2022-07-25 PROCEDURE — 77386 HC IMRT COMPLEX: CPT | Performed by: INTERNAL MEDICINE

## 2022-07-26 ENCOUNTER — NURSE ONLY (OUTPATIENT)
Dept: HEMATOLOGY/ONCOLOGY | Facility: HOSPITAL | Age: 52
End: 2022-07-26
Attending: INTERNAL MEDICINE
Payer: COMMERCIAL

## 2022-07-26 DIAGNOSIS — C15.5 MALIGNANT NEOPLASM OF LOWER THIRD OF ESOPHAGUS (HCC): Primary | ICD-10-CM

## 2022-07-26 LAB
ANION GAP SERPL CALC-SCNC: 1 MMOL/L (ref 0–18)
BASOPHILS # BLD AUTO: 0.03 X10(3) UL (ref 0–0.2)
BASOPHILS NFR BLD AUTO: 0.8 %
BUN BLD-MCNC: 11 MG/DL (ref 7–18)
CALCIUM BLD-MCNC: 9.3 MG/DL (ref 8.5–10.1)
CANCER AG19-9 SERPL-ACNC: 103.7 U/ML (ref ?–37)
CHLORIDE SERPL-SCNC: 107 MMOL/L (ref 98–112)
CO2 SERPL-SCNC: 28 MMOL/L (ref 21–32)
CREAT BLD-MCNC: 0.78 MG/DL
EOSINOPHIL # BLD AUTO: 0.06 X10(3) UL (ref 0–0.7)
EOSINOPHIL NFR BLD AUTO: 1.6 %
ERYTHROCYTE [DISTWIDTH] IN BLOOD BY AUTOMATED COUNT: 12.5 %
GLUCOSE BLD-MCNC: 108 MG/DL (ref 70–99)
HCT VFR BLD AUTO: 40 %
HGB BLD-MCNC: 13.2 G/DL
IMM GRANULOCYTES # BLD AUTO: 0.11 X10(3) UL (ref 0–1)
IMM GRANULOCYTES NFR BLD: 3 %
LYMPHOCYTES # BLD AUTO: 0.23 X10(3) UL (ref 1–4)
LYMPHOCYTES NFR BLD AUTO: 6.2 %
MCH RBC QN AUTO: 31.1 PG (ref 26–34)
MCHC RBC AUTO-ENTMCNC: 33 G/DL (ref 31–37)
MCV RBC AUTO: 94.3 FL
MONOCYTES # BLD AUTO: 0.44 X10(3) UL (ref 0.1–1)
MONOCYTES NFR BLD AUTO: 11.9 %
NEUTROPHILS # BLD AUTO: 2.83 X10 (3) UL (ref 1.5–7.7)
NEUTROPHILS # BLD AUTO: 2.83 X10(3) UL (ref 1.5–7.7)
NEUTROPHILS NFR BLD AUTO: 76.5 %
OSMOLALITY SERPL CALC.SUM OF ELEC: 282 MOSM/KG (ref 275–295)
PLATELET # BLD AUTO: 149 10(3)UL (ref 150–450)
POTASSIUM SERPL-SCNC: 4.5 MMOL/L (ref 3.5–5.1)
RBC # BLD AUTO: 4.24 X10(6)UL
SODIUM SERPL-SCNC: 136 MMOL/L (ref 136–145)
WBC # BLD AUTO: 3.7 X10(3) UL (ref 4–11)

## 2022-07-26 PROCEDURE — 36591 DRAW BLOOD OFF VENOUS DEVICE: CPT

## 2022-07-26 PROCEDURE — 85025 COMPLETE CBC W/AUTO DIFF WBC: CPT

## 2022-07-26 PROCEDURE — 80048 BASIC METABOLIC PNL TOTAL CA: CPT

## 2022-07-26 PROCEDURE — 77386 HC IMRT COMPLEX: CPT | Performed by: INTERNAL MEDICINE

## 2022-07-26 PROCEDURE — 86301 IMMUNOASSAY TUMOR CA 19-9: CPT

## 2022-07-26 RX ORDER — FAMOTIDINE 10 MG/ML
20 INJECTION, SOLUTION INTRAVENOUS ONCE
Status: CANCELLED | OUTPATIENT
Start: 2022-07-27

## 2022-07-26 RX ORDER — DIPHENHYDRAMINE HYDROCHLORIDE 50 MG/ML
50 INJECTION INTRAMUSCULAR; INTRAVENOUS ONCE
Status: CANCELLED | OUTPATIENT
Start: 2022-07-27

## 2022-07-27 ENCOUNTER — OFFICE VISIT (OUTPATIENT)
Dept: HEMATOLOGY/ONCOLOGY | Facility: HOSPITAL | Age: 52
End: 2022-07-27
Attending: INTERNAL MEDICINE
Payer: COMMERCIAL

## 2022-07-27 VITALS
WEIGHT: 278.19 LBS | BODY MASS INDEX: 35.7 KG/M2 | OXYGEN SATURATION: 99 % | HEIGHT: 73.9 IN | RESPIRATION RATE: 16 BRPM | TEMPERATURE: 97 F | SYSTOLIC BLOOD PRESSURE: 150 MMHG | HEART RATE: 60 BPM | DIASTOLIC BLOOD PRESSURE: 91 MMHG

## 2022-07-27 DIAGNOSIS — C15.5 MALIGNANT NEOPLASM OF LOWER THIRD OF ESOPHAGUS (HCC): Primary | ICD-10-CM

## 2022-07-27 PROCEDURE — 99214 OFFICE O/P EST MOD 30 MIN: CPT | Performed by: INTERNAL MEDICINE

## 2022-07-27 PROCEDURE — S0028 INJECTION, FAMOTIDINE, 20 MG: HCPCS | Performed by: INTERNAL MEDICINE

## 2022-07-27 PROCEDURE — 96417 CHEMO IV INFUS EACH ADDL SEQ: CPT

## 2022-07-27 PROCEDURE — 96413 CHEMO IV INFUSION 1 HR: CPT

## 2022-07-27 PROCEDURE — 96375 TX/PRO/DX INJ NEW DRUG ADDON: CPT

## 2022-07-27 PROCEDURE — 77386 HC IMRT COMPLEX: CPT | Performed by: INTERNAL MEDICINE

## 2022-07-27 RX ORDER — DIPHENHYDRAMINE HYDROCHLORIDE 50 MG/ML
50 INJECTION INTRAMUSCULAR; INTRAVENOUS ONCE
Status: COMPLETED | OUTPATIENT
Start: 2022-07-27 | End: 2022-07-27

## 2022-07-27 RX ORDER — FAMOTIDINE 10 MG/ML
20 INJECTION, SOLUTION INTRAVENOUS ONCE
Status: COMPLETED | OUTPATIENT
Start: 2022-07-27 | End: 2022-07-27

## 2022-07-27 RX ADMIN — DIPHENHYDRAMINE HYDROCHLORIDE 50 MG: 50 INJECTION INTRAMUSCULAR; INTRAVENOUS at 08:59:00

## 2022-07-27 RX ADMIN — FAMOTIDINE 20 MG: 10 INJECTION, SOLUTION INTRAVENOUS at 08:57:00

## 2022-07-27 NOTE — PROGRESS NOTES
Outpatient Oncology Care Plan   Problem list:   fatigue   Problems related to:   chemotherapy   Interventions:   provided general teaching   Expected outcomes:   understands plan of care   Progress towards outcome: making progress     Education Record   Learner: Patient   Barriers / Limitations: None   Method: Discussion   Outcome: Shows understanding   Comments:  Patient here for follow-up and planned C1D22 treatment. Denies any neuropathy or dysphagia. Energy levels are fair.

## 2022-07-27 NOTE — PROGRESS NOTES
Pt here for C1D22. Arrives Ambulating independently, accompanied by Self           Pregnancy screening: Not applicable    Modifications in dose or schedule: No    Drugs/infusions dual verified for appearance and physical integrity. IV pump settings were dual verified: yes     Frequency of blood return and site check throughout administration: Prior to administration   Discharged to Home, Ambulating independently, accompanied by:Self    Outpatient Oncology Care Plan  Problem list:  knowledge deficit  Problems related to:  side effect of treatment  Interventions:  monitor effect of therapy  Expected outcomes:  understands plan of care  Progress towards outcome:  making progress    Education Record    Learner:  Patient  Barriers / Limitations:  None  Method:  Discussion  Outcome:  Shows understanding  Comments:    Pt here for treatment. Feeling good, eating and drinking well this past week. Sometimes has some nausea/stomach upset during the night but manageable with prns. Appts made for labs next Tuesday and tx/MD on Wednesday.

## 2022-07-29 ENCOUNTER — APPOINTMENT (OUTPATIENT)
Dept: RADIATION ONCOLOGY | Facility: HOSPITAL | Age: 52
End: 2022-07-29
Attending: INTERNAL MEDICINE
Payer: COMMERCIAL

## 2022-07-29 ENCOUNTER — HOSPITAL ENCOUNTER (OUTPATIENT)
Dept: RADIATION ONCOLOGY | Facility: HOSPITAL | Age: 52
Discharge: HOME | End: 2022-07-29
Attending: INTERNAL MEDICINE
Payer: COMMERCIAL

## 2022-07-29 VITALS
WEIGHT: 279 LBS | HEART RATE: 58 BPM | BODY MASS INDEX: 36 KG/M2 | OXYGEN SATURATION: 97 % | TEMPERATURE: 97 F | DIASTOLIC BLOOD PRESSURE: 84 MMHG | RESPIRATION RATE: 20 BRPM | SYSTOLIC BLOOD PRESSURE: 121 MMHG

## 2022-07-29 DIAGNOSIS — C15.5 MALIGNANT NEOPLASM OF LOWER THIRD OF ESOPHAGUS (HCC): Primary | ICD-10-CM

## 2022-07-29 RX ORDER — LIDOCAINE HYDROCHLORIDE 20 MG/ML
SOLUTION OROPHARYNGEAL
Qty: 100 ML | Refills: 3 | Status: SHIPPED | OUTPATIENT
Start: 2022-07-29

## 2022-07-29 NOTE — PROGRESS NOTES
[facility] Radiation Treatment Management Note 16-20    Patient:  Brett Melton. Age:  45year old  Visit Diagnosis:  Adeno distal esophagus, cT3 N1  Primary Rad/Onc:  Dr. Zack Reddy    Site Delivered Dose (cGy) Prescribed Dose (cGy) Fraction #   ESOPHAGUS + LN 3240 4140 18/23   ESOPH + LN BOOST 0 900 0/5     First treatment date:   7/6/22  Concurrent chemotherapy:  WEEKLY CARBO/TAXOL    Oncology Vitals 7/22/2022 7/27/2022 7/29/2022   Height - 6' 1.898\" -   Height - 188 cm -   Weight 280 lb 278 lb 3.2 oz 279 lb   Weight 127.007 kg 126.191 kg 126.554 kg   BSA (m2) 2.5 m2 2.5 m2 2.5 m2   /91 150/91 121/84   Pulse 57 60 58   Resp 16 16 20   Temp 97.1 97.2 97   SpO2 97 99 97   Pain Score 0 - 5   Some recent data might be hidden        Toxicities:  Fatigue Grade 1= Fatigue relieved by rest  Nausea Grade 1= Loss of appetite without alteration in eating habits   Vomiting Grade 0= None  Flatulence Grade 0= None  Abdominal pain Grade 0= None    Nursing Note:  Recent Labs   Lab 07/26/22  1048   RBC 4.24*   HGB 13.2   HCT 40.0   MCV 94.3   MCH 31.1   MCHC 33.0   RDW 12.5   NEPRELIM 2.83   WBC 3.7*   .0*     C/O intermittent esophagitis. But able to get \"things down\" better now. Occ nausea, taking Zofran PRN with relief. Appetite fair, adequate fluids taken. Tolerating chemo well. Jose Enrique Arreola, RN    Physician Note:  Subjective:  -noticing mild discomfort with swallowing, not impacting food intake  -chemo nausea controlled with anti-emetics      Objective:  Vitals noted  ECOG 0   NAD  Lungs CTAB      Treatment setup imaging have been reviewed: Yes    Assessment/Plan:  -Tolerating treatment, cont CRT per plan. Lido Mylanta before meals  Soft foods, avoid GERD triggers  On pantoprazole bid  Tylenol prn    We discussed expected future toxicity. All questions answered.   Next OTV 1 week    Zack Reddy MD

## 2022-08-01 ENCOUNTER — HOSPITAL ENCOUNTER (OUTPATIENT)
Dept: RADIATION ONCOLOGY | Facility: HOSPITAL | Age: 52
Discharge: HOME | End: 2022-08-01
Attending: INTERNAL MEDICINE
Payer: COMMERCIAL

## 2022-08-01 PROCEDURE — 77386 HC IMRT COMPLEX: CPT | Performed by: INTERNAL MEDICINE

## 2022-08-02 ENCOUNTER — NURSE ONLY (OUTPATIENT)
Dept: HEMATOLOGY/ONCOLOGY | Facility: HOSPITAL | Age: 52
End: 2022-08-02
Attending: INTERNAL MEDICINE
Payer: COMMERCIAL

## 2022-08-02 DIAGNOSIS — C15.5 MALIGNANT NEOPLASM OF LOWER THIRD OF ESOPHAGUS (HCC): Primary | ICD-10-CM

## 2022-08-02 LAB
ANION GAP SERPL CALC-SCNC: 5 MMOL/L (ref 0–18)
BASOPHILS # BLD AUTO: 0.02 X10(3) UL (ref 0–0.2)
BASOPHILS NFR BLD AUTO: 0.7 %
BUN BLD-MCNC: 9 MG/DL (ref 7–18)
CALCIUM BLD-MCNC: 8.9 MG/DL (ref 8.5–10.1)
CANCER AG19-9 SERPL-ACNC: 49.5 U/ML (ref ?–37)
CHLORIDE SERPL-SCNC: 107 MMOL/L (ref 98–112)
CO2 SERPL-SCNC: 26 MMOL/L (ref 21–32)
CREAT BLD-MCNC: 0.74 MG/DL
EOSINOPHIL # BLD AUTO: 0.04 X10(3) UL (ref 0–0.7)
EOSINOPHIL NFR BLD AUTO: 1.3 %
ERYTHROCYTE [DISTWIDTH] IN BLOOD BY AUTOMATED COUNT: 12.8 %
FASTING STATUS PATIENT QL REPORTED: NO
GFR SERPLBLD BASED ON 1.73 SQ M-ARVRAT: 109 ML/MIN/1.73M2 (ref 60–?)
GLUCOSE BLD-MCNC: 107 MG/DL (ref 70–99)
HCT VFR BLD AUTO: 36.3 %
HGB BLD-MCNC: 12 G/DL
IMM GRANULOCYTES # BLD AUTO: 0.07 X10(3) UL (ref 0–1)
IMM GRANULOCYTES NFR BLD: 2.3 %
LYMPHOCYTES # BLD AUTO: 0.21 X10(3) UL (ref 1–4)
LYMPHOCYTES NFR BLD AUTO: 7 %
MCH RBC QN AUTO: 31.3 PG (ref 26–34)
MCHC RBC AUTO-ENTMCNC: 33.1 G/DL (ref 31–37)
MCV RBC AUTO: 94.5 FL
MONOCYTES # BLD AUTO: 0.34 X10(3) UL (ref 0.1–1)
MONOCYTES NFR BLD AUTO: 11.4 %
NEUTROPHILS # BLD AUTO: 2.31 X10 (3) UL (ref 1.5–7.7)
NEUTROPHILS # BLD AUTO: 2.31 X10(3) UL (ref 1.5–7.7)
NEUTROPHILS NFR BLD AUTO: 77.3 %
OSMOLALITY SERPL CALC.SUM OF ELEC: 285 MOSM/KG (ref 275–295)
PLATELET # BLD AUTO: 112 10(3)UL (ref 150–450)
POTASSIUM SERPL-SCNC: 4 MMOL/L (ref 3.5–5.1)
RBC # BLD AUTO: 3.84 X10(6)UL
SODIUM SERPL-SCNC: 138 MMOL/L (ref 136–145)
WBC # BLD AUTO: 3 X10(3) UL (ref 4–11)

## 2022-08-02 PROCEDURE — 77386 HC IMRT COMPLEX: CPT | Performed by: INTERNAL MEDICINE

## 2022-08-02 PROCEDURE — 80048 BASIC METABOLIC PNL TOTAL CA: CPT

## 2022-08-02 PROCEDURE — 85025 COMPLETE CBC W/AUTO DIFF WBC: CPT

## 2022-08-02 PROCEDURE — 86301 IMMUNOASSAY TUMOR CA 19-9: CPT

## 2022-08-02 PROCEDURE — 36591 DRAW BLOOD OFF VENOUS DEVICE: CPT

## 2022-08-02 RX ORDER — DIPHENHYDRAMINE HYDROCHLORIDE 50 MG/ML
50 INJECTION INTRAMUSCULAR; INTRAVENOUS ONCE
Status: CANCELLED | OUTPATIENT
Start: 2022-08-03

## 2022-08-02 RX ORDER — FAMOTIDINE 10 MG/ML
20 INJECTION, SOLUTION INTRAVENOUS ONCE
Status: CANCELLED | OUTPATIENT
Start: 2022-08-03

## 2022-08-03 ENCOUNTER — OFFICE VISIT (OUTPATIENT)
Dept: HEMATOLOGY/ONCOLOGY | Facility: HOSPITAL | Age: 52
End: 2022-08-03
Attending: INTERNAL MEDICINE
Payer: COMMERCIAL

## 2022-08-03 VITALS
WEIGHT: 282 LBS | HEART RATE: 67 BPM | OXYGEN SATURATION: 98 % | BODY MASS INDEX: 36.19 KG/M2 | TEMPERATURE: 98 F | RESPIRATION RATE: 16 BRPM | SYSTOLIC BLOOD PRESSURE: 115 MMHG | HEIGHT: 73.9 IN | DIASTOLIC BLOOD PRESSURE: 75 MMHG

## 2022-08-03 DIAGNOSIS — C15.5 MALIGNANT NEOPLASM OF LOWER THIRD OF ESOPHAGUS (HCC): Primary | ICD-10-CM

## 2022-08-03 PROCEDURE — 96413 CHEMO IV INFUSION 1 HR: CPT

## 2022-08-03 PROCEDURE — 96417 CHEMO IV INFUS EACH ADDL SEQ: CPT

## 2022-08-03 PROCEDURE — 96375 TX/PRO/DX INJ NEW DRUG ADDON: CPT

## 2022-08-03 PROCEDURE — S0028 INJECTION, FAMOTIDINE, 20 MG: HCPCS | Performed by: INTERNAL MEDICINE

## 2022-08-03 PROCEDURE — 99213 OFFICE O/P EST LOW 20 MIN: CPT | Performed by: INTERNAL MEDICINE

## 2022-08-03 PROCEDURE — 77386 HC IMRT COMPLEX: CPT | Performed by: INTERNAL MEDICINE

## 2022-08-03 RX ORDER — FAMOTIDINE 10 MG/ML
20 INJECTION, SOLUTION INTRAVENOUS ONCE
Status: COMPLETED | OUTPATIENT
Start: 2022-08-03 | End: 2022-08-03

## 2022-08-03 RX ORDER — DIPHENHYDRAMINE HYDROCHLORIDE 50 MG/ML
50 INJECTION INTRAMUSCULAR; INTRAVENOUS ONCE
Status: COMPLETED | OUTPATIENT
Start: 2022-08-03 | End: 2022-08-03

## 2022-08-03 RX ADMIN — DIPHENHYDRAMINE HYDROCHLORIDE 50 MG: 50 INJECTION INTRAMUSCULAR; INTRAVENOUS at 09:42:00

## 2022-08-03 RX ADMIN — FAMOTIDINE 20 MG: 10 INJECTION, SOLUTION INTRAVENOUS at 09:38:00

## 2022-08-03 NOTE — PROGRESS NOTES
Outpatient Oncology Care Plan   Problem list:   fatigue   Problems related to:   chemotherapy   Interventions:   provided general teaching   Expected outcomes:   understands plan of care   Progress towards outcome: making progress     Education Record   Learner: Patient   Barriers / Limitations: None   Method: Discussion   Outcome: Shows understanding   Comments:  Patient here for follow-up and planned C1D29 treatment. States energy levels are fair. Still having dysphagia but states it is not worse.

## 2022-08-03 NOTE — PROGRESS NOTES
Pt here for C1D29 Taxol/Carbo. Arrives Ambulating independently, accompanied by Spouse           Pregnancy screening: Not applicable    Modifications in dose or schedule: No    Drugs/infusions dual verified for appearance and physical integrity.  IV pump settings were dual verified: yes     Frequency of blood return and site check throughout administration: Prior to administration and At completion of therapy   Discharged to Home, Ambulating independently, accompanied by:Spouse    Outpatient Oncology Care Plan  Problem list:  fatigue  nausea and vomiting  nutrition  Problems related to:  chemotherapy  side effect of treatment  Interventions:  maintain safe environment  chemotherapy teaching  monitor lab values  Expected outcomes:  adequate sleep/rest  free of infection  maintains/gains weight  optimal lab values  symptoms relieved/minimized  understands plan of care  Progress towards outcome:  making progress    Education Record    Learner:  Patient and Spouse  Barriers / Limitations:  None  Method:  Discussion  Outcome:  Shows understanding  Comments:

## 2022-08-04 PROCEDURE — 77386 HC IMRT COMPLEX: CPT | Performed by: INTERNAL MEDICINE

## 2022-08-05 ENCOUNTER — HOSPITAL ENCOUNTER (OUTPATIENT)
Dept: RADIATION ONCOLOGY | Facility: HOSPITAL | Age: 52
Discharge: HOME | End: 2022-08-05
Attending: INTERNAL MEDICINE
Payer: COMMERCIAL

## 2022-08-05 ENCOUNTER — APPOINTMENT (OUTPATIENT)
Dept: RADIATION ONCOLOGY | Facility: HOSPITAL | Age: 52
End: 2022-08-05
Attending: INTERNAL MEDICINE
Payer: COMMERCIAL

## 2022-08-05 PROCEDURE — 77386 HC IMRT COMPLEX: CPT | Performed by: INTERNAL MEDICINE

## 2022-08-05 PROCEDURE — 77336 RADIATION PHYSICS CONSULT: CPT | Performed by: INTERNAL MEDICINE

## 2022-08-08 ENCOUNTER — HOSPITAL ENCOUNTER (OUTPATIENT)
Dept: RADIATION ONCOLOGY | Facility: HOSPITAL | Age: 52
Discharge: HOME | End: 2022-08-08
Attending: INTERNAL MEDICINE
Payer: COMMERCIAL

## 2022-08-08 ENCOUNTER — APPOINTMENT (OUTPATIENT)
Dept: RADIATION ONCOLOGY | Facility: HOSPITAL | Age: 52
End: 2022-08-08
Attending: INTERNAL MEDICINE
Payer: COMMERCIAL

## 2022-08-08 PROCEDURE — 77386 HC IMRT COMPLEX: CPT | Performed by: INTERNAL MEDICINE

## 2022-08-09 ENCOUNTER — HOSPITAL ENCOUNTER (OUTPATIENT)
Dept: RADIATION ONCOLOGY | Facility: HOSPITAL | Age: 52
End: 2022-08-09
Attending: INTERNAL MEDICINE
Payer: COMMERCIAL

## 2022-08-09 ENCOUNTER — NURSE ONLY (OUTPATIENT)
Dept: HEMATOLOGY/ONCOLOGY | Facility: HOSPITAL | Age: 52
End: 2022-08-09
Attending: INTERNAL MEDICINE
Payer: COMMERCIAL

## 2022-08-09 VITALS
HEART RATE: 68 BPM | RESPIRATION RATE: 18 BRPM | HEIGHT: 73.9 IN | DIASTOLIC BLOOD PRESSURE: 90 MMHG | SYSTOLIC BLOOD PRESSURE: 154 MMHG | TEMPERATURE: 97 F | WEIGHT: 275.63 LBS | OXYGEN SATURATION: 98 % | BODY MASS INDEX: 35.37 KG/M2

## 2022-08-09 DIAGNOSIS — C15.5 MALIGNANT NEOPLASM OF LOWER THIRD OF ESOPHAGUS (HCC): Primary | ICD-10-CM

## 2022-08-09 LAB
ANION GAP SERPL CALC-SCNC: 5 MMOL/L (ref 0–18)
BASOPHILS # BLD AUTO: 0.01 X10(3) UL (ref 0–0.2)
BASOPHILS NFR BLD AUTO: 0.4 %
BUN BLD-MCNC: 13 MG/DL (ref 7–18)
CALCIUM BLD-MCNC: 9.5 MG/DL (ref 8.5–10.1)
CANCER AG19-9 SERPL-ACNC: 30.9 U/ML (ref ?–37)
CHLORIDE SERPL-SCNC: 106 MMOL/L (ref 98–112)
CO2 SERPL-SCNC: 26 MMOL/L (ref 21–32)
CREAT BLD-MCNC: 0.64 MG/DL
EOSINOPHIL # BLD AUTO: 0.03 X10(3) UL (ref 0–0.7)
EOSINOPHIL NFR BLD AUTO: 1.1 %
ERYTHROCYTE [DISTWIDTH] IN BLOOD BY AUTOMATED COUNT: 13.2 %
FASTING STATUS PATIENT QL REPORTED: NO
GFR SERPLBLD BASED ON 1.73 SQ M-ARVRAT: 114 ML/MIN/1.73M2 (ref 60–?)
GLUCOSE BLD-MCNC: 97 MG/DL (ref 70–99)
HCT VFR BLD AUTO: 37.3 %
HGB BLD-MCNC: 12.5 G/DL
IMM GRANULOCYTES # BLD AUTO: 0.05 X10(3) UL (ref 0–1)
IMM GRANULOCYTES NFR BLD: 1.8 %
LYMPHOCYTES # BLD AUTO: 0.12 X10(3) UL (ref 1–4)
LYMPHOCYTES NFR BLD AUTO: 4.4 %
MCH RBC QN AUTO: 31.3 PG (ref 26–34)
MCHC RBC AUTO-ENTMCNC: 33.5 G/DL (ref 31–37)
MCV RBC AUTO: 93.3 FL
MONOCYTES # BLD AUTO: 0.39 X10(3) UL (ref 0.1–1)
MONOCYTES NFR BLD AUTO: 14.2 %
NEUTROPHILS # BLD AUTO: 2.15 X10 (3) UL (ref 1.5–7.7)
NEUTROPHILS # BLD AUTO: 2.15 X10(3) UL (ref 1.5–7.7)
NEUTROPHILS NFR BLD AUTO: 78.1 %
OSMOLALITY SERPL CALC.SUM OF ELEC: 284 MOSM/KG (ref 275–295)
PLATELET # BLD AUTO: 138 10(3)UL (ref 150–450)
POTASSIUM SERPL-SCNC: 4.6 MMOL/L (ref 3.5–5.1)
RBC # BLD AUTO: 4 X10(6)UL
SODIUM SERPL-SCNC: 137 MMOL/L (ref 136–145)
WBC # BLD AUTO: 2.8 X10(3) UL (ref 4–11)

## 2022-08-09 PROCEDURE — 77386 HC IMRT COMPLEX: CPT | Performed by: INTERNAL MEDICINE

## 2022-08-09 PROCEDURE — 36591 DRAW BLOOD OFF VENOUS DEVICE: CPT

## 2022-08-09 PROCEDURE — 85025 COMPLETE CBC W/AUTO DIFF WBC: CPT

## 2022-08-09 PROCEDURE — 86301 IMMUNOASSAY TUMOR CA 19-9: CPT

## 2022-08-09 PROCEDURE — 80048 BASIC METABOLIC PNL TOTAL CA: CPT

## 2022-08-09 NOTE — PROGRESS NOTES
Education Record    Learner:  Patient    Disease / Diagnosis: esophageal ca    Barriers / Limitations:  None    Method:  Brief focused, printed material and  reinforcement    General Topics:  Plan of care reviewed    Outcome:  Shows understanding    Here for cll for treatment tomorrow.

## 2022-08-10 ENCOUNTER — OFFICE VISIT (OUTPATIENT)
Dept: HEMATOLOGY/ONCOLOGY | Facility: HOSPITAL | Age: 52
End: 2022-08-10
Attending: INTERNAL MEDICINE
Payer: COMMERCIAL

## 2022-08-10 VITALS
BODY MASS INDEX: 35.42 KG/M2 | DIASTOLIC BLOOD PRESSURE: 98 MMHG | SYSTOLIC BLOOD PRESSURE: 171 MMHG | RESPIRATION RATE: 18 BRPM | OXYGEN SATURATION: 99 % | WEIGHT: 276 LBS | HEART RATE: 65 BPM | TEMPERATURE: 98 F | HEIGHT: 73.9 IN

## 2022-08-10 DIAGNOSIS — C15.5 MALIGNANT NEOPLASM OF LOWER THIRD OF ESOPHAGUS (HCC): Primary | ICD-10-CM

## 2022-08-10 PROCEDURE — 96413 CHEMO IV INFUSION 1 HR: CPT

## 2022-08-10 PROCEDURE — 77386 HC IMRT COMPLEX: CPT | Performed by: INTERNAL MEDICINE

## 2022-08-10 PROCEDURE — 99213 OFFICE O/P EST LOW 20 MIN: CPT | Performed by: INTERNAL MEDICINE

## 2022-08-10 PROCEDURE — 96417 CHEMO IV INFUS EACH ADDL SEQ: CPT

## 2022-08-10 PROCEDURE — 96375 TX/PRO/DX INJ NEW DRUG ADDON: CPT

## 2022-08-10 PROCEDURE — S0028 INJECTION, FAMOTIDINE, 20 MG: HCPCS | Performed by: INTERNAL MEDICINE

## 2022-08-10 RX ORDER — FAMOTIDINE 10 MG/ML
20 INJECTION, SOLUTION INTRAVENOUS ONCE
Status: COMPLETED | OUTPATIENT
Start: 2022-08-10 | End: 2022-08-10

## 2022-08-10 RX ORDER — DIPHENHYDRAMINE HYDROCHLORIDE 50 MG/ML
50 INJECTION INTRAMUSCULAR; INTRAVENOUS ONCE
Status: COMPLETED | OUTPATIENT
Start: 2022-08-10 | End: 2022-08-10

## 2022-08-10 RX ADMIN — DIPHENHYDRAMINE HYDROCHLORIDE 50 MG: 50 INJECTION INTRAMUSCULAR; INTRAVENOUS at 09:50:00

## 2022-08-10 RX ADMIN — FAMOTIDINE 20 MG: 10 INJECTION, SOLUTION INTRAVENOUS at 09:53:00

## 2022-08-10 NOTE — PROGRESS NOTES
Outpatient Oncology Care Plan   Problem list:   fatigue   Problems related to:   chemotherapy   Interventions:   provided general teaching   Expected outcomes:   understands plan of care   Progress towards outcome: making progress     Education Record   Learner: Patient   Barriers / Limitations: None   Method: Discussion   Outcome: Shows understanding   Comments:  Patient here for follow-up and planned C1D36 treatment. Will complete radiation this week.  Energy levels are lower than usual.

## 2022-08-10 NOTE — PROGRESS NOTES
Pt here for C1D36 Taxol/Carboplatin. Arrives Ambulating independently, accompanied by Self           Pregnancy screening: Not applicable    Modifications in dose or schedule: No    Drugs/infusions dual verified for appearance and physical integrity.  IV pump settings were dual verified: yes     Frequency of blood return and site check throughout administration: Prior to administration, Every 2-3 ml IVP and At completion of therapy   Discharged to Home, Ambulating independently, accompanied by:Self    Outpatient Oncology Care Plan  Problem list:  fatigue  Problems related to:  side effect of treatment  Interventions:  maintain safe environment  encourage activity as tolerated  promoted rest  provided general teaching  Expected outcomes:  adequate sleep/rest  safe in environment  symptoms relieved/minimized  understands plan of care  Progress towards outcome:  making progress    Education Record    Learner:  Patient  Barriers / Limitations:  None  Method:  Brief focused and Reinforcement  Outcome:  Shows understanding  Comments:

## 2022-08-11 PROCEDURE — 77386 HC IMRT COMPLEX: CPT | Performed by: INTERNAL MEDICINE

## 2022-08-11 NOTE — PATIENT INSTRUCTIONS
- WE WILL CALL TO SCHEDULE YOU FOR A FOLLOW-UP WITH DR. TURNER  - SIDE EFFECTS OF RADIATION WILL GRADUALLY SUBSIDE. IT MAY TAKE 1- 2 WEEKS POST-RADIATION FOR YOU TO NOTICE CHANGES SUCH AS A DECREASE IN YOUR FATIGUE LEVEL, DECREASE IN DIFFICULTY AND/OR PAIN WHEN SWALLOWING, DECREASE IN REDNESS OF TREATED SKIN AREA.   - CONTINUE TAKING YOUR MEDICATIONS. - CALL THE NURSE LINE AT [phone] IF YOU HAVE ANY QUESTIONS/CONCERNS REGARDING RADIATION THERAPY.

## 2022-08-12 ENCOUNTER — APPOINTMENT (OUTPATIENT)
Dept: RADIATION ONCOLOGY | Facility: HOSPITAL | Age: 52
End: 2022-08-12
Attending: INTERNAL MEDICINE
Payer: COMMERCIAL

## 2022-08-12 ENCOUNTER — HOSPITAL ENCOUNTER (OUTPATIENT)
Dept: RADIATION ONCOLOGY | Facility: HOSPITAL | Age: 52
Discharge: HOME | End: 2022-08-12
Attending: INTERNAL MEDICINE
Payer: COMMERCIAL

## 2022-08-12 PROCEDURE — 77336 RADIATION PHYSICS CONSULT: CPT | Performed by: INTERNAL MEDICINE

## 2022-08-15 ENCOUNTER — HOSPITAL ENCOUNTER (OUTPATIENT)
Dept: RADIATION ONCOLOGY | Facility: HOSPITAL | Age: 52
End: 2022-08-15
Attending: INTERNAL MEDICINE
Payer: COMMERCIAL

## 2022-08-15 ENCOUNTER — DOCUMENTATION ONLY (OUTPATIENT)
Dept: RADIATION ONCOLOGY | Facility: HOSPITAL | Age: 52
End: 2022-08-15

## 2022-08-15 VITALS
HEART RATE: 76 BPM | OXYGEN SATURATION: 97 % | BODY MASS INDEX: 35 KG/M2 | RESPIRATION RATE: 20 BRPM | DIASTOLIC BLOOD PRESSURE: 89 MMHG | TEMPERATURE: 98 F | WEIGHT: 272 LBS | SYSTOLIC BLOOD PRESSURE: 143 MMHG

## 2022-08-15 DIAGNOSIS — C15.5 MALIGNANT NEOPLASM OF LOWER THIRD OF ESOPHAGUS (HCC): Primary | ICD-10-CM

## 2022-08-15 PROCEDURE — 77386 HC IMRT COMPLEX: CPT | Performed by: RADIOLOGY

## 2022-08-15 NOTE — PATIENT INSTRUCTIONS
- WE WILL CALL TO SCHEDULE YOU FOR A FOLLOW-UP WITH DR. TURNER  - SIDE EFFECTS OF RADIATION WILL GRADUALLY SUBSIDE. IT MAY TAKE 1- 2 WEEKS POST-RADIATION FOR YOU TO NOTICE CHANGES SUCH AS A DECREASE IN YOUR FATIGUE LEVEL, DECREASE IN DIFFICULTY AND/OR PAIN WHEN SWALLOWING, DECREASE IN REDNESS OF TREATED SKIN AREA.   - CONTINUE TAKING YOUR MEDICATIONS. - CALL THE NURSE LINE AT [phone] IF YOU HAVE ANY QUESTIONS/CONCERNS REGARDING RADIATION THERAPY.

## 2022-08-15 NOTE — PROGRESS NOTES
[facility] Radiation Treatment Management Note 26-30    Patient:  Liliana Ngo. Age:  45year old  Visit Diagnosis:    1. Malignant neoplasm of lower third of esophagus (HCC)      Primary Rad/Onc:  Dr. Meri Jang    Site Delivered Dose (cGy) Prescribed Dose (cGy) Fraction #   Esoph + LN 4140 4140 23/23   Esoph+ LN Bst 900 900 5/5     First treatment date:   7/6/2022  Concurrent chemotherapy: Carbo/ Taxol weekly. Oncology Vitals 8/9/2022 8/10/2022 8/15/2022   Height 6' 1.898\" 6' 1.898\" -   Height 188 cm 188 cm -   Weight 275 lb 9.6 oz 276 lb 272 lb   Weight 125. 011 kg 125.193 kg 123.378 kg   BSA (m2) 2.49 m2 2.49 m2 2.47 m2   /90 171/98 143/89   Pulse 68 65 76   Resp 18 18 20   Temp 97.3 97.6 98.2   SpO2 98 99 97   Pain Score - 0 0   Some recent data might be hidden        Toxicities:  Fatigue Grade 1= Fatigue relieved by rest  Dermatitis associated with radiation Grade 1= Faint erythema or dry desquamation  Moisturizer used Vanicream Number of times: 1 times daily. Dysphagia Grade 1=  Symptomatic, able to eat regular diet  Dyspepsia Grade 0= None  Mucositis Grade 0= None  Constipation Grade 0= None  Diarrhea  Grade 0= None  Nausea Grade 0= None  Vomiting Grade 0= None  Xerostomia Not Present=No      Nursing Note:  Recent Labs   Lab 08/02/22  1039 08/09/22  0843   WBC 3.0* 2.8*   RBC 3.84* 4.00*   HGB 12.0* 12.5*   HCT 36.3* 37.3*   .0* 138.0*   MCV 94.5 93.3   MCH 31.3 31.3   MCHC 33.1 33.5   RDW 12.8 13.2   NEPRELIM 2.31 2.15   NE 2.31 2.15   LYMABS 0.21* 0.12*   MOABSO 0.34 0.39   EOABSO 0.04 0.03   BAABSO 0.02 0.01   IMMGRAN 0.07 0.05   NEPERCENT 77.3 78.1   LYPERCENT 7.0 4.4   MOPERCENT 11.4 14.2   EOPERCENT 1.3 1.1   BAPERCENT 0.7 0.4   IMMGRANPERC 2.3 1.8      Pt done with RT today, AVS to give and review. To have PET/CT on 9/6, follow-up with Dr. Antonio Oneal day after. Has intermittent esophagitis, not using Lido/Maalox \"caused face numbness\".   States just watching what he eats, appetite fair. No nausea felt. No SOB/cough. Hardik Mart, RN    Physician Note:  Subjective:  Completed RT today. Tolerated well overall. Eating rather well, no major difficulty with swallowing. Appetite fair. Had reaction to viscous lidocaine as per RN note above. Objective:  Unchanged      Treatment setup imaging have been reviewed:   Yes    Assessment/Plan:    Completed RT    PET soon    Surgery in 6-8 weeks    Next visit:  F/u per Dr Rafael Musa

## 2022-08-22 ENCOUNTER — HOSPITAL ENCOUNTER (OUTPATIENT)
Dept: RADIATION ONCOLOGY | Facility: HOSPITAL | Age: 52
Discharge: HOME | End: 2022-08-22
Attending: INTERNAL MEDICINE
Payer: COMMERCIAL

## 2022-08-23 NOTE — PROGRESS NOTES
Radiation Oncology Treatment Summary    Diagnosis: adenocarcinoma of the distal esophagus/GEJ, cT3 N1 M0, stage IIIB        Concurrent Treatments: weekly carbo/taxol    IGRT: daily CBCT    Clinical Course: The treatment course was completed as prescribed. He tolerated treatment well with grade 1 esophagitis. This was managed with dietary modification. Follow-up: Return to clinic in 1 week or sooner if needed. Continue follow-up with other physicians as planned. For more information, or to request a detailed radiation treatment summary, please call [facility] at our [place] location, [phone].

## 2022-09-06 ENCOUNTER — HOSPITAL ENCOUNTER (OUTPATIENT)
Dept: NUCLEAR MEDICINE | Facility: HOSPITAL | Age: 52
Discharge: HOME | End: 2022-09-06
Attending: INTERNAL MEDICINE
Payer: COMMERCIAL

## 2022-09-06 DIAGNOSIS — C15.5 MALIGNANT NEOPLASM OF LOWER THIRD OF ESOPHAGUS (HCC): ICD-10-CM

## 2022-09-06 LAB — GLUCOSE BLD-MCNC: 85 MG/DL (ref 70–99)

## 2022-09-06 PROCEDURE — 82962 GLUCOSE BLOOD TEST: CPT

## 2022-09-06 PROCEDURE — 78815 PET IMAGE W/CT SKULL-THIGH: CPT | Performed by: INTERNAL MEDICINE

## 2022-09-07 ENCOUNTER — OFFICE VISIT (OUTPATIENT)
Dept: SURGERY | Facility: CLINIC | Age: 52
End: 2022-09-07
Payer: COMMERCIAL

## 2022-09-07 ENCOUNTER — OFFICE VISIT (OUTPATIENT)
Dept: HEMATOLOGY/ONCOLOGY | Facility: HOSPITAL | Age: 52
End: 2022-09-07
Attending: INTERNAL MEDICINE

## 2022-09-07 VITALS
OXYGEN SATURATION: 98 % | BODY MASS INDEX: 35 KG/M2 | SYSTOLIC BLOOD PRESSURE: 127 MMHG | WEIGHT: 271 LBS | TEMPERATURE: 97 F | DIASTOLIC BLOOD PRESSURE: 78 MMHG | HEART RATE: 78 BPM | RESPIRATION RATE: 16 BRPM

## 2022-09-07 VITALS
BODY MASS INDEX: 35 KG/M2 | WEIGHT: 274 LBS | DIASTOLIC BLOOD PRESSURE: 86 MMHG | SYSTOLIC BLOOD PRESSURE: 137 MMHG | RESPIRATION RATE: 18 BRPM | TEMPERATURE: 98 F | OXYGEN SATURATION: 98 % | HEART RATE: 70 BPM

## 2022-09-07 DIAGNOSIS — C15.5 MALIGNANT NEOPLASM OF LOWER THIRD OF ESOPHAGUS (HCC): ICD-10-CM

## 2022-09-07 DIAGNOSIS — I25.10 CORONARY ARTERY DISEASE INVOLVING NATIVE CORONARY ARTERY OF NATIVE HEART WITHOUT ANGINA PECTORIS: ICD-10-CM

## 2022-09-07 DIAGNOSIS — E66.01 MORBID OBESITY WITH BMI OF 40.0-44.9, ADULT (HCC): ICD-10-CM

## 2022-09-07 DIAGNOSIS — C15.5 MALIGNANT NEOPLASM OF LOWER THIRD OF ESOPHAGUS (HCC): Primary | ICD-10-CM

## 2022-09-07 DIAGNOSIS — E78.5 HYPERLIPIDEMIA WITH TARGET LOW DENSITY LIPOPROTEIN (LDL) CHOLESTEROL LESS THAN 70 MG/DL: ICD-10-CM

## 2022-09-07 DIAGNOSIS — Z01.818 PRE-OP TESTING: Primary | ICD-10-CM

## 2022-09-07 PROCEDURE — 99211 OFF/OP EST MAY X REQ PHY/QHP: CPT

## 2022-09-07 PROCEDURE — 99214 OFFICE O/P EST MOD 30 MIN: CPT | Performed by: SURGERY

## 2022-09-07 PROCEDURE — 3078F DIAST BP <80 MM HG: CPT | Performed by: SURGERY

## 2022-09-07 PROCEDURE — 3074F SYST BP LT 130 MM HG: CPT | Performed by: SURGERY

## 2022-09-09 NOTE — TELEPHONE ENCOUNTER
Patient states he is taking 2 tabs daily. Please approve or deny.     Last refilled-8/2/2019    Last OV- 8/2/2019 Yes

## 2022-09-10 ENCOUNTER — LAB ENCOUNTER (OUTPATIENT)
Dept: LAB | Age: 52
End: 2022-09-10
Attending: STUDENT IN AN ORGANIZED HEALTH CARE EDUCATION/TRAINING PROGRAM
Payer: COMMERCIAL

## 2022-09-10 DIAGNOSIS — C15.5 MALIGNANT NEOPLASM OF LOWER THIRD OF ESOPHAGUS (HCC): ICD-10-CM

## 2022-09-10 LAB — SARS-COV-2 RNA RESP QL NAA+PROBE: NOT DETECTED

## 2022-09-12 ENCOUNTER — TELEPHONE (OUTPATIENT)
Dept: SURGERY | Facility: CLINIC | Age: 52
End: 2022-09-12

## 2022-09-12 NOTE — TELEPHONE ENCOUNTER
Pt informed me he has not seen cardiologist in 5 years and that all his cardiac care and medications are completed by PCP. Let patient know he would need clearance and anticoagulation hold from PCP. Pt stated understanding.

## 2022-09-13 ENCOUNTER — RT VISIT (OUTPATIENT)
Dept: RESPIRATORY THERAPY | Facility: HOSPITAL | Age: 52
End: 2022-09-13
Attending: STUDENT IN AN ORGANIZED HEALTH CARE EDUCATION/TRAINING PROGRAM
Payer: COMMERCIAL

## 2022-09-13 DIAGNOSIS — C15.5 MALIGNANT NEOPLASM OF LOWER THIRD OF ESOPHAGUS (HCC): ICD-10-CM

## 2022-09-13 PROCEDURE — 94726 PLETHYSMOGRAPHY LUNG VOLUMES: CPT

## 2022-09-13 PROCEDURE — 94010 BREATHING CAPACITY TEST: CPT

## 2022-09-13 PROCEDURE — 94729 DIFFUSING CAPACITY: CPT

## 2022-09-14 ENCOUNTER — OFFICE VISIT (OUTPATIENT)
Dept: FAMILY MEDICINE CLINIC | Facility: CLINIC | Age: 52
End: 2022-09-14
Payer: COMMERCIAL

## 2022-09-14 ENCOUNTER — LAB ENCOUNTER (OUTPATIENT)
Dept: LAB | Age: 52
End: 2022-09-14
Attending: SURGERY
Payer: COMMERCIAL

## 2022-09-14 VITALS
WEIGHT: 275 LBS | RESPIRATION RATE: 16 BRPM | DIASTOLIC BLOOD PRESSURE: 80 MMHG | OXYGEN SATURATION: 97 % | HEART RATE: 73 BPM | SYSTOLIC BLOOD PRESSURE: 130 MMHG | BODY MASS INDEX: 35.29 KG/M2 | HEIGHT: 74 IN

## 2022-09-14 DIAGNOSIS — Z01.818 PRE-OP TESTING: Primary | ICD-10-CM

## 2022-09-14 DIAGNOSIS — E78.5 HYPERLIPIDEMIA WITH TARGET LOW DENSITY LIPOPROTEIN (LDL) CHOLESTEROL LESS THAN 70 MG/DL: ICD-10-CM

## 2022-09-14 DIAGNOSIS — F41.1 GAD (GENERALIZED ANXIETY DISORDER): ICD-10-CM

## 2022-09-14 DIAGNOSIS — C15.5 MALIGNANT NEOPLASM OF LOWER THIRD OF ESOPHAGUS (HCC): ICD-10-CM

## 2022-09-14 DIAGNOSIS — Z01.818 PRE-OP TESTING: ICD-10-CM

## 2022-09-14 DIAGNOSIS — Z95.1 S/P CABG X 4: ICD-10-CM

## 2022-09-14 DIAGNOSIS — E66.01 MORBID OBESITY WITH BMI OF 40.0-44.9, ADULT (HCC): ICD-10-CM

## 2022-09-14 LAB
ALBUMIN SERPL-MCNC: 3.7 G/DL (ref 3.4–5)
ALBUMIN/GLOB SERPL: 1.1 {RATIO} (ref 1–2)
ALP LIVER SERPL-CCNC: 80 U/L
ALT SERPL-CCNC: 33 U/L
ANION GAP SERPL CALC-SCNC: 1 MMOL/L (ref 0–18)
AST SERPL-CCNC: 22 U/L (ref 15–37)
BILIRUB SERPL-MCNC: 0.4 MG/DL (ref 0.1–2)
BUN BLD-MCNC: 14 MG/DL (ref 7–18)
CALCIUM BLD-MCNC: 9.3 MG/DL (ref 8.5–10.1)
CHLORIDE SERPL-SCNC: 108 MMOL/L (ref 98–112)
CO2 SERPL-SCNC: 28 MMOL/L (ref 21–32)
CREAT BLD-MCNC: 0.87 MG/DL
FASTING STATUS PATIENT QL REPORTED: NO
GFR SERPLBLD BASED ON 1.73 SQ M-ARVRAT: 104 ML/MIN/1.73M2 (ref 60–?)
GLOBULIN PLAS-MCNC: 3.4 G/DL (ref 2.8–4.4)
GLUCOSE BLD-MCNC: 105 MG/DL (ref 70–99)
OSMOLALITY SERPL CALC.SUM OF ELEC: 285 MOSM/KG (ref 275–295)
POTASSIUM SERPL-SCNC: 4 MMOL/L (ref 3.5–5.1)
PROT SERPL-MCNC: 7.1 G/DL (ref 6.4–8.2)
SODIUM SERPL-SCNC: 137 MMOL/L (ref 136–145)

## 2022-09-14 PROCEDURE — 3075F SYST BP GE 130 - 139MM HG: CPT | Performed by: FAMILY MEDICINE

## 2022-09-14 PROCEDURE — 80053 COMPREHEN METABOLIC PANEL: CPT | Performed by: SURGERY

## 2022-09-14 PROCEDURE — 99214 OFFICE O/P EST MOD 30 MIN: CPT | Performed by: FAMILY MEDICINE

## 2022-09-14 PROCEDURE — 3008F BODY MASS INDEX DOCD: CPT | Performed by: FAMILY MEDICINE

## 2022-09-14 PROCEDURE — 3079F DIAST BP 80-89 MM HG: CPT | Performed by: FAMILY MEDICINE

## 2022-09-16 NOTE — PROCEDURES
Findings:  FEV1 is 3.63L, 83% predicted. FVC is 4.58L, 82% predicted. FEV1/ FVC ratio is 0.79. The flow-volume loop demonstrates a normal pattern. The TLC is 7.50L, 94% predicted. The residual volume 2.91L, 123% predicted. The diffusion capacity is 66% predicted and 81% predicted when corrected for alveolar volume. Impression:  There is no airway obstruction on spirometry and visualized on flow-volume loop. Lung volumes are normal.  Diffusion capacity is mildly reduced with DLCO of 66% but improves to normal when considering alveolar volume. This may represent a normal finding but can be seen in emphysema, interstitial lung disease, pulmonary vascular disease (such as pulmonary hypertension) and anemia. If not already performed, would suggest further evaluation as determined clinically. There are no  previous pulmonary function tests available for comparison.

## 2022-09-17 ENCOUNTER — TELEPHONE (OUTPATIENT)
Dept: FAMILY MEDICINE CLINIC | Facility: CLINIC | Age: 52
End: 2022-09-17

## 2022-09-17 NOTE — TELEPHONE ENCOUNTER
Pre-op testing     CMP  H&P  EKG    Faxed to Dr. Alysha Garcia office and [facility].

## 2022-09-20 RX ORDER — ATORVASTATIN CALCIUM 20 MG/1
TABLET, FILM COATED ORAL
Qty: 90 TABLET | Refills: 0 | Status: SHIPPED | OUTPATIENT
Start: 2022-09-20

## 2022-09-20 RX ORDER — METOPROLOL SUCCINATE 50 MG/1
TABLET, EXTENDED RELEASE ORAL
Qty: 90 TABLET | Refills: 0 | Status: SHIPPED | OUTPATIENT
Start: 2022-09-20

## 2022-09-20 NOTE — TELEPHONE ENCOUNTER
LOV- 9/14/2022- Pre-op       Refill-    Last ordered: 3 months ago by Reported External/Patient Last refill: 6/26/2022         Never filled by Dr Alexis Parmar, Please advise

## 2022-09-22 ENCOUNTER — SOCIAL WORK SERVICES (OUTPATIENT)
Dept: HEMATOLOGY/ONCOLOGY | Facility: HOSPITAL | Age: 52
End: 2022-09-22

## 2022-09-22 ENCOUNTER — TELEPHONE (OUTPATIENT)
Dept: SURGERY | Facility: CLINIC | Age: 52
End: 2022-09-22

## 2022-09-22 NOTE — PROGRESS NOTES
completed Surgery [facility], faxing to [facility] #[phone] and sent to patient via [address]

## 2022-09-27 ENCOUNTER — LABORATORY ENCOUNTER (OUTPATIENT)
Dept: LAB | Age: 52
End: 2022-09-27
Attending: SURGERY
Payer: COMMERCIAL

## 2022-09-27 ENCOUNTER — LAB ENCOUNTER (OUTPATIENT)
Dept: LAB | Age: 52
End: 2022-09-27
Attending: SURGERY
Payer: COMMERCIAL

## 2022-09-27 DIAGNOSIS — C15.5 MALIGNANT NEOPLASM OF LOWER THIRD OF ESOPHAGUS (HCC): ICD-10-CM

## 2022-09-27 DIAGNOSIS — Z20.822 ENCOUNTER FOR PREOPERATIVE SCREENING LABORATORY TESTING FOR COVID-19 VIRUS: ICD-10-CM

## 2022-09-27 DIAGNOSIS — Z01.812 ENCOUNTER FOR PREOPERATIVE SCREENING LABORATORY TESTING FOR COVID-19 VIRUS: ICD-10-CM

## 2022-09-27 LAB
ANTIBODY SCREEN: NEGATIVE
BASOPHILS # BLD AUTO: 0.03 X10(3) UL (ref 0–0.2)
BASOPHILS NFR BLD AUTO: 0.6 %
EOSINOPHIL # BLD AUTO: 0.24 X10(3) UL (ref 0–0.7)
EOSINOPHIL NFR BLD AUTO: 4.9 %
ERYTHROCYTE [DISTWIDTH] IN BLOOD BY AUTOMATED COUNT: 14 %
HCT VFR BLD AUTO: 37 %
HGB BLD-MCNC: 12.1 G/DL
IMM GRANULOCYTES # BLD AUTO: 0.06 X10(3) UL (ref 0–1)
IMM GRANULOCYTES NFR BLD: 1.2 %
LYMPHOCYTES # BLD AUTO: 0.74 X10(3) UL (ref 1–4)
LYMPHOCYTES NFR BLD AUTO: 15.3 %
MCH RBC QN AUTO: 32.7 PG (ref 26–34)
MCHC RBC AUTO-ENTMCNC: 32.7 G/DL (ref 31–37)
MCV RBC AUTO: 100 FL
MONOCYTES # BLD AUTO: 0.43 X10(3) UL (ref 0.1–1)
MONOCYTES NFR BLD AUTO: 8.9 %
NEUTROPHILS # BLD AUTO: 3.35 X10 (3) UL (ref 1.5–7.7)
NEUTROPHILS # BLD AUTO: 3.35 X10(3) UL (ref 1.5–7.7)
NEUTROPHILS NFR BLD AUTO: 69.1 %
PLATELET # BLD AUTO: 190 10(3)UL (ref 150–450)
RBC # BLD AUTO: 3.7 X10(6)UL
RH BLOOD TYPE: POSITIVE
WBC # BLD AUTO: 4.9 X10(3) UL (ref 4–11)

## 2022-09-27 PROCEDURE — 85025 COMPLETE CBC W/AUTO DIFF WBC: CPT | Performed by: SURGERY

## 2022-09-27 PROCEDURE — 86850 RBC ANTIBODY SCREEN: CPT | Performed by: SURGERY

## 2022-09-27 PROCEDURE — 86900 BLOOD TYPING SEROLOGIC ABO: CPT | Performed by: SURGERY

## 2022-09-27 PROCEDURE — 86901 BLOOD TYPING SEROLOGIC RH(D): CPT | Performed by: SURGERY

## 2022-09-27 RX ORDER — CETIRIZINE HYDROCHLORIDE 5 MG/1
5 TABLET ORAL DAILY
COMMUNITY

## 2022-09-28 LAB — SARS-COV-2 RNA RESP QL NAA+PROBE: NOT DETECTED

## 2022-09-29 ENCOUNTER — ANESTHESIA EVENT (OUTPATIENT)
Dept: SURGERY | Facility: HOSPITAL | Age: 52
DRG: 326 | End: 2022-09-29
Payer: COMMERCIAL

## 2022-09-30 ENCOUNTER — ANESTHESIA (OUTPATIENT)
Dept: SURGERY | Facility: HOSPITAL | Age: 52
DRG: 326 | End: 2022-09-30
Payer: COMMERCIAL

## 2022-09-30 ENCOUNTER — HOSPITAL ENCOUNTER (INPATIENT)
Facility: HOSPITAL | Age: 52
LOS: 7 days | Discharge: HOME HEALTH | DRG: 326 | End: 2022-10-07
Attending: SURGERY | Admitting: SURGERY
Payer: COMMERCIAL

## 2022-09-30 DIAGNOSIS — Z01.812 ENCOUNTER FOR PREOPERATIVE SCREENING LABORATORY TESTING FOR COVID-19 VIRUS: ICD-10-CM

## 2022-09-30 DIAGNOSIS — Z20.822 ENCOUNTER FOR PREOPERATIVE SCREENING LABORATORY TESTING FOR COVID-19 VIRUS: ICD-10-CM

## 2022-09-30 DIAGNOSIS — C15.5 MALIGNANT NEOPLASM OF LOWER THIRD OF ESOPHAGUS (HCC): Primary | ICD-10-CM

## 2022-09-30 LAB
BASE EXCESS BLD CALC-SCNC: -3 MMOL/L
CA-I BLD-SCNC: 1.18 MMOL/L (ref 1.12–1.32)
CO2 BLD-SCNC: 24 MMOL/L (ref 22–32)
GLUCOSE BLD-MCNC: 160 MG/DL (ref 70–99)
HCO3 BLD-SCNC: 22.8 MEQ/L
HCT VFR BLD CALC: 33 %
PCO2 BLD: 44.3 MMHG
PH BLD: 7.32 [PH]
PO2 BLD: 80 MMHG
POTASSIUM BLD-SCNC: 4.3 MMOL/L (ref 3.6–5.1)
SAO2 % BLD: 95 %
SODIUM BLD-SCNC: 138 MMOL/L (ref 136–145)

## 2022-09-30 PROCEDURE — 76942 ECHO GUIDE FOR BIOPSY: CPT | Performed by: ANESTHESIOLOGY

## 2022-09-30 PROCEDURE — 8E0W4CZ ROBOTIC ASSISTED PROCEDURE OF TRUNK REGION, PERCUTANEOUS ENDOSCOPIC APPROACH: ICD-10-PCS | Performed by: THORACIC SURGERY (CARDIOTHORACIC VASCULAR SURGERY)

## 2022-09-30 PROCEDURE — 0DB64ZZ EXCISION OF STOMACH, PERCUTANEOUS ENDOSCOPIC APPROACH: ICD-10-PCS | Performed by: THORACIC SURGERY (CARDIOTHORACIC VASCULAR SURGERY)

## 2022-09-30 PROCEDURE — 99233 SBSQ HOSP IP/OBS HIGH 50: CPT | Performed by: HOSPITALIST

## 2022-09-30 PROCEDURE — 07B74ZZ EXCISION OF THORAX LYMPHATIC, PERCUTANEOUS ENDOSCOPIC APPROACH: ICD-10-PCS | Performed by: THORACIC SURGERY (CARDIOTHORACIC VASCULAR SURGERY)

## 2022-09-30 PROCEDURE — 0DT34ZZ RESECTION OF LOWER ESOPHAGUS, PERCUTANEOUS ENDOSCOPIC APPROACH: ICD-10-PCS | Performed by: THORACIC SURGERY (CARDIOTHORACIC VASCULAR SURGERY)

## 2022-09-30 PROCEDURE — 0BJ08ZZ INSPECTION OF TRACHEOBRONCHIAL TREE, VIA NATURAL OR ARTIFICIAL OPENING ENDOSCOPIC: ICD-10-PCS | Performed by: THORACIC SURGERY (CARDIOTHORACIC VASCULAR SURGERY)

## 2022-09-30 PROCEDURE — 3E0T3BZ INTRODUCTION OF ANESTHETIC AGENT INTO PERIPHERAL NERVES AND PLEXI, PERCUTANEOUS APPROACH: ICD-10-PCS | Performed by: THORACIC SURGERY (CARDIOTHORACIC VASCULAR SURGERY)

## 2022-09-30 PROCEDURE — 0DHA3UZ INSERTION OF FEEDING DEVICE INTO JEJUNUM, PERCUTANEOUS APPROACH: ICD-10-PCS | Performed by: SURGERY

## 2022-09-30 RX ORDER — ACETAMINOPHEN 10 MG/ML
INJECTION, SOLUTION INTRAVENOUS
Status: COMPLETED
Start: 2022-09-30 | End: 2022-09-30

## 2022-09-30 RX ORDER — HYDROMORPHONE HYDROCHLORIDE 1 MG/ML
INJECTION, SOLUTION INTRAMUSCULAR; INTRAVENOUS; SUBCUTANEOUS
Status: COMPLETED
Start: 2022-09-30 | End: 2022-09-30

## 2022-09-30 RX ORDER — ONDANSETRON 2 MG/ML
4 INJECTION INTRAMUSCULAR; INTRAVENOUS EVERY 6 HOURS PRN
Status: DISCONTINUED | OUTPATIENT
Start: 2022-09-30 | End: 2022-09-30 | Stop reason: HOSPADM

## 2022-09-30 RX ORDER — ACETAMINOPHEN 500 MG
1000 TABLET ORAL ONCE AS NEEDED
Status: DISCONTINUED | OUTPATIENT
Start: 2022-09-30 | End: 2022-09-30 | Stop reason: HOSPADM

## 2022-09-30 RX ORDER — CEFOXITIN 2 G/1
INJECTION, POWDER, FOR SOLUTION INTRAVENOUS AS NEEDED
Status: DISCONTINUED | OUTPATIENT
Start: 2022-09-30 | End: 2022-09-30 | Stop reason: SURG

## 2022-09-30 RX ORDER — SODIUM CHLORIDE 9 MG/ML
INJECTION, SOLUTION INTRAVENOUS CONTINUOUS
Status: DISCONTINUED | OUTPATIENT
Start: 2022-09-30 | End: 2022-10-02

## 2022-09-30 RX ORDER — HEPARIN SODIUM 5000 [USP'U]/ML
5000 INJECTION, SOLUTION INTRAVENOUS; SUBCUTANEOUS ONCE
Status: COMPLETED | OUTPATIENT
Start: 2022-09-30 | End: 2022-09-30

## 2022-09-30 RX ORDER — FAMOTIDINE 10 MG/ML
20 INJECTION, SOLUTION INTRAVENOUS 2 TIMES DAILY
Status: DISCONTINUED | OUTPATIENT
Start: 2022-09-30 | End: 2022-10-07

## 2022-09-30 RX ORDER — SODIUM CHLORIDE, SODIUM LACTATE, POTASSIUM CHLORIDE, CALCIUM CHLORIDE 600; 310; 30; 20 MG/100ML; MG/100ML; MG/100ML; MG/100ML
INJECTION, SOLUTION INTRAVENOUS CONTINUOUS
Status: DISCONTINUED | OUTPATIENT
Start: 2022-09-30 | End: 2022-09-30 | Stop reason: HOSPADM

## 2022-09-30 RX ORDER — ACETAMINOPHEN 10 MG/ML
1000 INJECTION, SOLUTION INTRAVENOUS EVERY 6 HOURS
Status: DISCONTINUED | OUTPATIENT
Start: 2022-09-30 | End: 2022-10-02

## 2022-09-30 RX ORDER — ROCURONIUM BROMIDE 10 MG/ML
INJECTION, SOLUTION INTRAVENOUS AS NEEDED
Status: DISCONTINUED | OUTPATIENT
Start: 2022-09-30 | End: 2022-09-30 | Stop reason: SURG

## 2022-09-30 RX ORDER — DEXAMETHASONE SODIUM PHOSPHATE 10 MG/ML
INJECTION, SOLUTION INTRAMUSCULAR; INTRAVENOUS AS NEEDED
Status: DISCONTINUED | OUTPATIENT
Start: 2022-09-30 | End: 2022-09-30 | Stop reason: SURG

## 2022-09-30 RX ORDER — MIDAZOLAM HYDROCHLORIDE 1 MG/ML
1 INJECTION INTRAMUSCULAR; INTRAVENOUS EVERY 5 MIN PRN
Status: DISCONTINUED | OUTPATIENT
Start: 2022-09-30 | End: 2022-09-30 | Stop reason: HOSPADM

## 2022-09-30 RX ORDER — SODIUM CHLORIDE, SODIUM LACTATE, POTASSIUM CHLORIDE, CALCIUM CHLORIDE 600; 310; 30; 20 MG/100ML; MG/100ML; MG/100ML; MG/100ML
INJECTION, SOLUTION INTRAVENOUS CONTINUOUS
Status: DISCONTINUED | OUTPATIENT
Start: 2022-09-30 | End: 2022-10-04

## 2022-09-30 RX ORDER — METOPROLOL TARTRATE 5 MG/5ML
2.5 INJECTION INTRAVENOUS ONCE
Status: DISCONTINUED | OUTPATIENT
Start: 2022-09-30 | End: 2022-09-30 | Stop reason: HOSPADM

## 2022-09-30 RX ORDER — METOPROLOL SUCCINATE 50 MG/1
50 TABLET, EXTENDED RELEASE ORAL DAILY
COMMUNITY

## 2022-09-30 RX ORDER — SODIUM CHLORIDE, SODIUM LACTATE, POTASSIUM CHLORIDE, CALCIUM CHLORIDE 600; 310; 30; 20 MG/100ML; MG/100ML; MG/100ML; MG/100ML
INJECTION, SOLUTION INTRAVENOUS CONTINUOUS
Status: DISCONTINUED | OUTPATIENT
Start: 2022-09-30 | End: 2022-10-02

## 2022-09-30 RX ORDER — MEPERIDINE HYDROCHLORIDE 25 MG/ML
12.5 INJECTION INTRAMUSCULAR; INTRAVENOUS; SUBCUTANEOUS AS NEEDED
Status: DISCONTINUED | OUTPATIENT
Start: 2022-09-30 | End: 2022-09-30 | Stop reason: HOSPADM

## 2022-09-30 RX ORDER — MIDAZOLAM HYDROCHLORIDE 1 MG/ML
INJECTION INTRAMUSCULAR; INTRAVENOUS AS NEEDED
Status: DISCONTINUED | OUTPATIENT
Start: 2022-09-30 | End: 2022-09-30 | Stop reason: SURG

## 2022-09-30 RX ORDER — KETAMINE HYDROCHLORIDE 50 MG/ML
INJECTION, SOLUTION, CONCENTRATE INTRAMUSCULAR; INTRAVENOUS AS NEEDED
Status: DISCONTINUED | OUTPATIENT
Start: 2022-09-30 | End: 2022-09-30 | Stop reason: SURG

## 2022-09-30 RX ORDER — PROCHLORPERAZINE EDISYLATE 5 MG/ML
5 INJECTION INTRAMUSCULAR; INTRAVENOUS EVERY 8 HOURS PRN
Status: DISCONTINUED | OUTPATIENT
Start: 2022-09-30 | End: 2022-09-30 | Stop reason: HOSPADM

## 2022-09-30 RX ORDER — HYDROMORPHONE HYDROCHLORIDE 1 MG/ML
0.6 INJECTION, SOLUTION INTRAMUSCULAR; INTRAVENOUS; SUBCUTANEOUS EVERY 5 MIN PRN
Status: DISCONTINUED | OUTPATIENT
Start: 2022-09-30 | End: 2022-09-30 | Stop reason: HOSPADM

## 2022-09-30 RX ORDER — PANTOPRAZOLE SODIUM 40 MG/1
40 TABLET, DELAYED RELEASE ORAL
COMMUNITY

## 2022-09-30 RX ORDER — ENOXAPARIN SODIUM 100 MG/ML
40 INJECTION SUBCUTANEOUS DAILY
Status: DISCONTINUED | OUTPATIENT
Start: 2022-10-01 | End: 2022-10-07

## 2022-09-30 RX ORDER — DEXAMETHASONE SODIUM PHOSPHATE 4 MG/ML
VIAL (ML) INJECTION AS NEEDED
Status: DISCONTINUED | OUTPATIENT
Start: 2022-09-30 | End: 2022-09-30 | Stop reason: SURG

## 2022-09-30 RX ORDER — METOCLOPRAMIDE HYDROCHLORIDE 5 MG/ML
INJECTION INTRAMUSCULAR; INTRAVENOUS AS NEEDED
Status: DISCONTINUED | OUTPATIENT
Start: 2022-09-30 | End: 2022-09-30 | Stop reason: SURG

## 2022-09-30 RX ORDER — ONDANSETRON 2 MG/ML
4 INJECTION INTRAMUSCULAR; INTRAVENOUS EVERY 6 HOURS PRN
Status: DISCONTINUED | OUTPATIENT
Start: 2022-09-30 | End: 2022-10-07

## 2022-09-30 RX ORDER — HYDROCODONE BITARTRATE AND ACETAMINOPHEN 5; 325 MG/1; MG/1
1 TABLET ORAL ONCE AS NEEDED
Status: DISCONTINUED | OUTPATIENT
Start: 2022-09-30 | End: 2022-09-30 | Stop reason: HOSPADM

## 2022-09-30 RX ORDER — ATORVASTATIN CALCIUM 20 MG/1
20 TABLET, FILM COATED ORAL NIGHTLY
COMMUNITY

## 2022-09-30 RX ORDER — KETOROLAC TROMETHAMINE 30 MG/ML
INJECTION, SOLUTION INTRAMUSCULAR; INTRAVENOUS AS NEEDED
Status: DISCONTINUED | OUTPATIENT
Start: 2022-09-30 | End: 2022-09-30 | Stop reason: SURG

## 2022-09-30 RX ORDER — ACETAMINOPHEN 500 MG
1000 TABLET ORAL ONCE
Status: DISCONTINUED | OUTPATIENT
Start: 2022-09-30 | End: 2022-09-30 | Stop reason: HOSPADM

## 2022-09-30 RX ORDER — HYDROCODONE BITARTRATE AND ACETAMINOPHEN 5; 325 MG/1; MG/1
2 TABLET ORAL ONCE AS NEEDED
Status: DISCONTINUED | OUTPATIENT
Start: 2022-09-30 | End: 2022-09-30 | Stop reason: HOSPADM

## 2022-09-30 RX ORDER — HYDROMORPHONE HYDROCHLORIDE 1 MG/ML
0.2 INJECTION, SOLUTION INTRAMUSCULAR; INTRAVENOUS; SUBCUTANEOUS EVERY 5 MIN PRN
Status: DISCONTINUED | OUTPATIENT
Start: 2022-09-30 | End: 2022-09-30 | Stop reason: HOSPADM

## 2022-09-30 RX ORDER — ONDANSETRON 2 MG/ML
INJECTION INTRAMUSCULAR; INTRAVENOUS AS NEEDED
Status: DISCONTINUED | OUTPATIENT
Start: 2022-09-30 | End: 2022-09-30 | Stop reason: SURG

## 2022-09-30 RX ORDER — BUPIVACAINE HYDROCHLORIDE 2.5 MG/ML
INJECTION, SOLUTION EPIDURAL; INFILTRATION; INTRACAUDAL AS NEEDED
Status: DISCONTINUED | OUTPATIENT
Start: 2022-09-30 | End: 2022-09-30 | Stop reason: HOSPADM

## 2022-09-30 RX ORDER — ROPIVACAINE HYDROCHLORIDE 5 MG/ML
INJECTION, SOLUTION EPIDURAL; INFILTRATION; PERINEURAL AS NEEDED
Status: DISCONTINUED | OUTPATIENT
Start: 2022-09-30 | End: 2022-09-30 | Stop reason: SURG

## 2022-09-30 RX ORDER — HYDROMORPHONE HYDROCHLORIDE 1 MG/ML
0.4 INJECTION, SOLUTION INTRAMUSCULAR; INTRAVENOUS; SUBCUTANEOUS EVERY 5 MIN PRN
Status: DISCONTINUED | OUTPATIENT
Start: 2022-09-30 | End: 2022-09-30 | Stop reason: HOSPADM

## 2022-09-30 RX ORDER — FAMOTIDINE 20 MG/1
20 TABLET, FILM COATED ORAL 2 TIMES DAILY
Status: DISCONTINUED | OUTPATIENT
Start: 2022-09-30 | End: 2022-10-07

## 2022-09-30 RX ORDER — NALOXONE HYDROCHLORIDE 0.4 MG/ML
80 INJECTION, SOLUTION INTRAMUSCULAR; INTRAVENOUS; SUBCUTANEOUS AS NEEDED
Status: DISCONTINUED | OUTPATIENT
Start: 2022-09-30 | End: 2022-09-30 | Stop reason: HOSPADM

## 2022-09-30 RX ADMIN — ONDANSETRON 4 MG: 2 INJECTION INTRAMUSCULAR; INTRAVENOUS at 19:17:00

## 2022-09-30 RX ADMIN — CEFOXITIN 2 G: 2 INJECTION, POWDER, FOR SOLUTION INTRAVENOUS at 17:25:00

## 2022-09-30 RX ADMIN — SODIUM CHLORIDE, SODIUM LACTATE, POTASSIUM CHLORIDE, CALCIUM CHLORIDE: 600; 310; 30; 20 INJECTION, SOLUTION INTRAVENOUS at 17:12:00

## 2022-09-30 RX ADMIN — ROPIVACAINE HYDROCHLORIDE 60 ML: 5 INJECTION, SOLUTION EPIDURAL; INFILTRATION; PERINEURAL at 19:48:00

## 2022-09-30 RX ADMIN — ROCURONIUM BROMIDE 70 MG: 10 INJECTION, SOLUTION INTRAVENOUS at 10:55:00

## 2022-09-30 RX ADMIN — SODIUM CHLORIDE, SODIUM LACTATE, POTASSIUM CHLORIDE, CALCIUM CHLORIDE: 600; 310; 30; 20 INJECTION, SOLUTION INTRAVENOUS at 18:33:00

## 2022-09-30 RX ADMIN — CEFOXITIN 2 G: 2 INJECTION, POWDER, FOR SOLUTION INTRAVENOUS at 15:25:00

## 2022-09-30 RX ADMIN — CEFOXITIN 2 G: 2 INJECTION, POWDER, FOR SOLUTION INTRAVENOUS at 19:15:00

## 2022-09-30 RX ADMIN — CEFOXITIN 2 G: 2 INJECTION, POWDER, FOR SOLUTION INTRAVENOUS at 13:25:00

## 2022-09-30 RX ADMIN — CEFOXITIN 2 G: 2 INJECTION, POWDER, FOR SOLUTION INTRAVENOUS at 11:25:00

## 2022-09-30 RX ADMIN — SODIUM CHLORIDE, SODIUM LACTATE, POTASSIUM CHLORIDE, CALCIUM CHLORIDE: 600; 310; 30; 20 INJECTION, SOLUTION INTRAVENOUS at 10:46:00

## 2022-09-30 RX ADMIN — DEXAMETHASONE SODIUM PHOSPHATE 8 MG: 4 MG/ML VIAL (ML) INJECTION at 11:40:00

## 2022-09-30 RX ADMIN — ROCURONIUM BROMIDE 50 MG: 10 INJECTION, SOLUTION INTRAVENOUS at 16:10:00

## 2022-09-30 RX ADMIN — MIDAZOLAM HYDROCHLORIDE 4 MG: 1 INJECTION INTRAMUSCULAR; INTRAVENOUS at 10:55:00

## 2022-09-30 RX ADMIN — ROCURONIUM BROMIDE 30 MG: 10 INJECTION, SOLUTION INTRAVENOUS at 13:03:00

## 2022-09-30 RX ADMIN — METOCLOPRAMIDE HYDROCHLORIDE 10 MG: 5 INJECTION INTRAMUSCULAR; INTRAVENOUS at 11:40:00

## 2022-09-30 RX ADMIN — KETAMINE HYDROCHLORIDE 50 MG: 50 INJECTION, SOLUTION, CONCENTRATE INTRAMUSCULAR; INTRAVENOUS at 10:55:00

## 2022-09-30 RX ADMIN — SODIUM CHLORIDE, SODIUM LACTATE, POTASSIUM CHLORIDE, CALCIUM CHLORIDE: 600; 310; 30; 20 INJECTION, SOLUTION INTRAVENOUS at 20:03:00

## 2022-09-30 RX ADMIN — KETOROLAC TROMETHAMINE 30 MG: 30 INJECTION, SOLUTION INTRAMUSCULAR; INTRAVENOUS at 19:17:00

## 2022-09-30 RX ADMIN — SODIUM CHLORIDE, SODIUM LACTATE, POTASSIUM CHLORIDE, CALCIUM CHLORIDE: 600; 310; 30; 20 INJECTION, SOLUTION INTRAVENOUS at 15:21:00

## 2022-09-30 RX ADMIN — DEXAMETHASONE SODIUM PHOSPHATE 4 MG: 10 INJECTION, SOLUTION INTRAMUSCULAR; INTRAVENOUS at 19:48:00

## 2022-09-30 NOTE — ANESTHESIA PROCEDURE NOTES
Peripheral IV  Inserted by: Giancarlo Zaidi MD    Placement  Needle gauge: 4 Fr Powerwand. Laterality: right  Location: forearm  Local anesthetic: none  Site prep: alcohol  Placement technique: 20g PIV placed x 1. Exchanged over guidewire for Powerwand catheter.   Attempts: 1

## 2022-09-30 NOTE — INTERVAL H&P NOTE
There has been no significant change since I saw patient as documented in [facility]. Surgery revisted. To proceed as planned. Emili Ferguson MD FACS

## 2022-09-30 NOTE — ANESTHESIA PROCEDURE NOTES
Arterial Line  Performed by: Christopher Talavera MD  Authorized by: Christopher Talavera MD     General Information and Staff    Procedure Start:   Anesthesiologist: Christopher Talavera MD  Performed By:  Anesthesiologist  Patient Location:  OR  Indication: continuous blood pressure monitoring and blood sampling needed    Site Identification: surface landmarks    Preanesthetic Checklist: 2 patient identifiers, IV checked, risks and benefits discussed, monitors and equipment checked, pre-op evaluation, timeout performed, anesthesia consent and sterile technique used    Procedure Details    Catheter Size:  20 G  Catheter Length:  1 and 3/4 inchCatheter Type:  Arrow  Seldinger Technique?: Yes  Site:  Radial artery  Site Prep: chlorhexidine  Line Secured:  Wrist Brace, tape and Tegaderm    Assessment    Events: patient tolerated procedure well with no complications      Medications      Additional Comments

## 2022-09-30 NOTE — BRIEF OP NOTE
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]     Post-Operative Diagnosis: Malignant neoplasm of lower third of esophagus ([address] [C15.5]      Procedure Performed:   Laparoscopic robotic assisted, esophagogastrectomy with jejunostomy feeding tube placement    Surgeon(s) and Role:  Panel 1:     * Lucian Watkins MD - Primary     * Cecilia Adames MD - Assisting Surgeon  Panel 2:     * Estuardo Zapata., Fransisca Cortez MD - Primary    Assistant(s):  Surgical Assistant.: HALIMA Sanchez  PA: Ladonna Lopezer [place]     Surgical Findings: Pending PATH     Specimen: Not during my portion     Estimated Blood Loss: Blood Output: 30 mL (9/30/2022  3:48 PM)        Nu Jiménez MD  9/30/2022  4:06 PM

## 2022-09-30 NOTE — ANESTHESIA PROCEDURE NOTES
Airway  Date/Time: 9/30/2022 10:56 AM  Urgency: elective      General Information and Staff    Patient location during procedure: OR  Anesthesiologist: Christopher Talavera MD  Performed: anesthesiologist     Indications and Patient Condition  Indications for airway management: anesthesia  Sedation level: deep  Preoxygenated: yes  Patient position: sniffing  Mask difficulty assessment: 2 - vent by mask + OA or adjuvant +/- NMBA    Final Airway Details  Final airway type: endotracheal airway      Successful airway: ETT - double lumen left  Cuffed: yes   Successful intubation technique: direct laryngoscopy  Endotracheal tube insertion site: oral  Blade: Sowmya  Blade size: #3  ETT DL size (fr): 39    Cormack-Lehane Classification: grade I - full view of glottis  Placement verified by: chest auscultation and capnometry   Measured from: lips  Number of attempts at approach: 1

## 2022-10-01 LAB
ALBUMIN SERPL-MCNC: 3.1 G/DL (ref 3.4–5)
ALBUMIN/GLOB SERPL: 0.9 {RATIO} (ref 1–2)
ALP LIVER SERPL-CCNC: 60 U/L
ALT SERPL-CCNC: 139 U/L
ANION GAP SERPL CALC-SCNC: 3 MMOL/L (ref 0–18)
AST SERPL-CCNC: 101 U/L (ref 15–37)
BILIRUB SERPL-MCNC: 0.5 MG/DL (ref 0.1–2)
BUN BLD-MCNC: 17 MG/DL (ref 7–18)
CALCIUM BLD-MCNC: 8.9 MG/DL (ref 8.5–10.1)
CHLORIDE SERPL-SCNC: 108 MMOL/L (ref 98–112)
CO2 SERPL-SCNC: 27 MMOL/L (ref 21–32)
CREAT BLD-MCNC: 0.84 MG/DL
ERYTHROCYTE [DISTWIDTH] IN BLOOD BY AUTOMATED COUNT: 13.7 %
GFR SERPLBLD BASED ON 1.73 SQ M-ARVRAT: 105 ML/MIN/1.73M2 (ref 60–?)
GLOBULIN PLAS-MCNC: 3.3 G/DL (ref 2.8–4.4)
GLUCOSE BLD-MCNC: 151 MG/DL (ref 70–99)
HCT VFR BLD AUTO: 34.5 %
HGB BLD-MCNC: 11.1 G/DL
MAGNESIUM SERPL-MCNC: 2 MG/DL (ref 1.6–2.6)
MCH RBC QN AUTO: 32.7 PG (ref 26–34)
MCHC RBC AUTO-ENTMCNC: 32.2 G/DL (ref 31–37)
MCV RBC AUTO: 101.8 FL
OSMOLALITY SERPL CALC.SUM OF ELEC: 290 MOSM/KG (ref 275–295)
PHOSPHATE SERPL-MCNC: 4.2 MG/DL (ref 2.5–4.9)
PLATELET # BLD AUTO: 162 10(3)UL (ref 150–450)
POTASSIUM SERPL-SCNC: 5 MMOL/L (ref 3.5–5.1)
PROT SERPL-MCNC: 6.4 G/DL (ref 6.4–8.2)
RBC # BLD AUTO: 3.39 X10(6)UL
SODIUM SERPL-SCNC: 138 MMOL/L (ref 136–145)
WBC # BLD AUTO: 10.6 X10(3) UL (ref 4–11)

## 2022-10-01 RX ORDER — METOPROLOL TARTRATE 5 MG/5ML
5 INJECTION INTRAVENOUS EVERY 6 HOURS
Status: DISCONTINUED | OUTPATIENT
Start: 2022-10-01 | End: 2022-10-06

## 2022-10-01 RX ORDER — METOPROLOL TARTRATE 5 MG/5ML
5 INJECTION INTRAVENOUS EVERY 6 HOURS PRN
Status: CANCELLED | OUTPATIENT
Start: 2022-09-30

## 2022-10-01 RX ORDER — SODIUM BICARBONATE 325 MG/1
325 TABLET ORAL AS NEEDED
Status: DISCONTINUED | OUTPATIENT
Start: 2022-10-01 | End: 2022-10-07

## 2022-10-01 RX ORDER — FUROSEMIDE 10 MG/ML
10 INJECTION INTRAMUSCULAR; INTRAVENOUS ONCE
Status: COMPLETED | OUTPATIENT
Start: 2022-10-01 | End: 2022-10-01

## 2022-10-01 RX ORDER — KETOROLAC TROMETHAMINE 15 MG/ML
15 INJECTION, SOLUTION INTRAMUSCULAR; INTRAVENOUS ONCE AS NEEDED
Status: COMPLETED | OUTPATIENT
Start: 2022-10-01 | End: 2022-10-01

## 2022-10-01 NOTE — PROGRESS NOTES
Pt received from PACU at 2130. Slightly drowsy, oriented x4.  2L O2. NSR on tele. Right chest tube to -20 suction, sanguinous output. NGT to LIS, scant output, bloody/brown drainage. Neumann with adequate/borderline urine output. Pain well controlled with Dilaudid PCA and Ketamine. IVF infusing. Pt updated on plan of care.

## 2022-10-01 NOTE — PLAN OF CARE
Assumed patient care at 0700  Awake, alert & oriented x4  Tele; NSR  Weaned to room air  C/o mild throat pain; some relief with lozenge. Pain controlled with Ketamine gtt, PCA dilaudid, and tylenol. C/o Right sided abdominal pain this evening despite medication; Dr. Ashok Quiroga updated; IV toradol given  Abdominal lap sites C/D/I  R chest tube to -20 wall suction with serosanguineous output; no air leak   Neumann removed; mildly low urine output per surgical oncology protocol; Dr. Ashok Quiroga notified; no new orders  BP elevated this am; Dr. hCantal Mauro notified; IV metoprolol as scheduled  Tube feeds infusing at 15mL/hr; tolerating via J-tube  Hypoactive BS, (-) gas, (-) belching  Up to chair for 5 hours  Ambulating in halls x1  PT recommending Home  Family at bedside    Plan of care discussed with patient, family, and physicians.

## 2022-10-01 NOTE — BRIEF OP NOTE
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]     Post-Operative Diagnosis: Malignant neoplasm of lower third of esophagus ([address] [C15.5]      Procedure Performed:   Laparoscopic robot assisted esophagogastrectomy    Surgeon(s) and Role:     * Suzie Munoz MD - Primary    Assistant(s):  Mark Turpin PA-C     Surgical Findings: as expected     Specimen:   ID Type Source Tests Collected by Time Destination   1 : Thoracic Level Nine Lymph Node Tissue Lymph node SURGICAL PATHOLOGY TISSUE Suzie Munoz MD 9/30/2022 1646    2 : Thoracic Level Seven Lymph Node Tissue Lymph node SURGICAL PATHOLOGY TISSUE Maritza Knight MD 9/30/2022 1649    3 : Thoracic Level Eight Lymph Node Tissue Lymph node SURGICAL PATHOLOGY TISSUE Maritza Knight MD 9/30/2022 1729    4 : Angeline-Gastric Tissue Tissue Abdomen SURGICAL PATHOLOGY TISSUE Maritza Knight MD 9/30/2022 1737    5 :  Final Gastric Margin Tissue Abdomen SURGICAL PATHOLOGY TISSUE Maritza Knight MD 9/30/2022 1758    6 : gastroesophagectomy Tissue Abdomen SURGICAL PATHOLOGY TISSUE Maritza Knight MD 9/30/2022 1907        Estimated Blood Loss: 50cc our portion     Disposition: PACU to 7th floor     Mark Turpin PA-C  9/30/2022  8:05 PM

## 2022-10-01 NOTE — PROGRESS NOTES
10/01/22 1534   Clinical Encounter Type   Visited With Patient and family together   Routine Visit Introduction  (Dr. Maria Elena Enriquez consult)   Continue Visiting No   Referral From Physician   Referral To    Judaism Encounters   Judaism Needs Prayer   Taxonomy   Intended Effects Helping someone feel comforted   Methods Explore leopoldo and values   Interventions Prayer for healing;Ask questions to bring forth feelings    initiated relationship with Amparo Mcmullen and his partner, providing active listening as they shared of his health challenges and how he faced them.  normalized the situation as appropriate. Prayers were said \"We love prayer warriors\" they said. Amparo Mcmullen seemed positive about continuing treatment. Spiritual Care support can be requested via an Epic consult.

## 2022-10-01 NOTE — ANESTHESIA PROCEDURE NOTES
Regional Block  Performed by: Benny Tom MD  Authorized by: Benny Tom MD       General Information and Staff    Start Time:   Anesthesiologist: Benny Tom MD  Performed by: Anesthesiologist  Patient Location:  OR    Block Placement: Post Induction  Site Identification: real time ultrasound guided and image stored and retrievable    Block site/laterality marked before start: site marked  Reason for Block: at surgeon's request and post-op pain management    Preanesthetic Checklist: 2 patient identifers, IV checked, risks and benefits discussed, monitors and equipment checked, pre-op evaluation, timeout performed, anesthesia consent, sterile technique used, no prohibitive neurological deficits and no local skin infection at insertion site      Procedure Details    Patient Position:  Supine  Prep: ChloraPrep    Monitoring:  Cardiac monitor, continuous pulse ox and blood pressure cuff  Block Type:  TAP  Laterality:  Bilateral  Injection Technique:  Single-shot    Needle    Needle Type:  Short-bevel and echogenic  Needle Localization:  Ultrasound guidance  Reason for Ultrasound Use: appropriate spread of the medication was noted in real time and no ultrasound evidence of intravascular and/or intraneural injection            Assessment    Injection Assessment:  Good spread noted, negative resistance, negative aspiration for heme, incremental injection and low pressure  Heart Rate Change: No    - Patient tolerated block procedure well without evidence of immediate block related complications.      Medications      Additional Comments    60 mL 0.5% ropiv w/ 4 mg PF dex

## 2022-10-01 NOTE — OPERATIVE REPORT
[address] [place]    PATIENT'S NAME: Emily Hill   ATTENDING PHYSICIAN: Agustín Sanchez. Kavya Joy MD   OPERATING PHYSICIAN: Yury White. Eda Gaviria MD   PATIENT ACCOUNT#:   [id number]    LOCATION:  [address] [place] [address]  MEDICAL RECORD #:   [id number]       YOB: 1970  ADMISSION DATE:       09/30/2022      OPERATION DATE:  09/30/2022    OPERATIVE REPORT      PREOPERATIVE DIAGNOSIS:    1. Esophageal cancer. 2.   Status post neoadjuvant chemotherapy and radiation. POSTOPERATIVE DIAGNOSIS:    1. Esophageal cancer. 2.   Status post neoadjuvant chemotherapy and radiation. PROCEDURE:    1. Flexible bronchoscopy. 2.   Robot-assisted transthoracic gastroesophagectomy. 3.   Mediastinal lymph node dissection. 4.   Multilevel intercostal nerve block. CO-SURGEONBarbara Joy MD.    ASSISTANT:  Dangelo North PA-C. ANESTHESIA:  General dual-lumen endotracheal.      ANESTHESIOLOGIST:  Clare Villarreal MD.    SPECIMENS:  Level 7 and 9 lymph nodes, final gastric margin, gastroesophagectomy. DRAINS:  A 24-[place]icarag[place] Michael drain x1. IMPLANTS:  None. ESTIMATED BLOOD LOSS:  50 mL for my portion. COMPLICATIONS:  None. CONDITION:  Stable, to PACU. INDICATIONS:  The patient is a 54-year-old man who was found to have esophageal cancer. He underwent neoadjuvant chemotherapy and radiation, was found to have a good response and was deemed appropriate for surgical resection. FINDINGS:  There is no evidence of metastatic disease in the chest.  The gastric conduit was pink and well perfused and was anastomosed without tension or torsion. OPERATIVE TECHNIQUE:  Patient was brought to the operating room, laid supine, underwent general dual-lumen endotracheal anesthesia. I performed a flexible bronchoscopy. The trachea, mainstem bronchi, lobar and segmental bronchi were examined bilaterally. This is a normal bronchoscopy. No tumor invasion in the airway was seen.   He then underwent a robot-assisted gastric mobilization, conduit creation, lymph node dissection, and jejunostomy placement with Dr. Nona Yan. Please see separate dictation for this portion of procedure. Once this was completed, the surgery was turned over to me and the patient was placed into left lateral decubitus position. All pressure points were padded. He was prepped and draped in a sterile fashion. A time-out was performed to confirm patient, side, and site of surgery. I made a 12 mm incision in the anterior axillary line, approximately sixth intercostal space and entered the chest bluntly. A camera through this incision revealed I was safely in the chest space. I applied CO2 insufflation to a pressure of 8 mmHg. I then made 4 additional incisions which were my standard incisions for transthoracic robotic gastroesophagectomy with the posterior inferior-most incision being 12 mm for an AirSeal assistant trocar. I then docked the [facility] [employer]. Once this was done, I placed instruments under direct visualization. I then began to explore the chest.  There was no sign of any pleural disease. I took down the inferior pulmonary ligament using the robotic vessel sealer. This was the primary energy device that I used for my dissection. I reflected the lung anteriorly and then entered the posterior mediastinum. I took down the mediastinal pleura anterior to the esophagus, heading from the esophageal hiatus, going up toward the azygos vein. I then took down the pleura posterior to the esophagus again from the hiatus to the azygos vein. I then began circumferential dissection around the esophagus. Once I was able to get entirely around the esophagus, I placed a Penrose drain. The Penrose acted as a handle to aid in dissection. I then continued my circumferential dissection of the esophagus going from the hiatus inferiorly up toward the azygos vein superiorly.   I used the pericardium as the border of my dissection anteriorly and the aorta as the border of my dissection posteriorly. As I worked cephalad, I came across lymph nodes in the subcarinal space. I did a subcarinal lymph node dissection and removed several lymph nodes from this area. Earlier I found the level 9 lymph nodes in the inferior pulmonary ligament, which were likewise dissected free and sent for pathology. The patient had a GE junction tumor and on review of his imaging, I noticed that he also had an aberrant right subclavian artery. This went behind the esophagus. Given the low nature of his tumor, I elected to keep my dissection anastomosis inferior to this artery. Once I had mobilized the esophagus circumferentially to the level of the azygos, I brought up the specimen. The specimen had been tied to the gastric conduit and the 2 sides were  and I laid the gastric conduit in the posterior mediastinum. I looked to see where to transect the proximal esophagus based on how much gastric conduit I had. This turned out to be about 2 cm below the azygos vein. At this level, I would have 4 cm of overlap between the esophagus and the gastric conduit. Once this site was chosen, I transected the proximal esophagus sharply. I then moved the specimen to the anterior part of the chest.  I then used a spatula electrocautery to make a small gastrotomy on the gastric conduit approximately 4 cm from the tip. On examining the conduit at this time, I noticed that the very tip of the gastric conduit was a little dusky and I did use my robotic stapler to wedge off this tip. This was sent as final gastric margin. Once this was done, I got another green load of the 45 mm robotic stapler and put the anvil into the gastrotomy. I ensured that it was within the lumen of the gastric conduit. I then brought this up and inserted the cartridge side into the open end of the proximal esophagus.   I lined up the 2 sides with muscle and mucosa at the crotch of the stapler and then closed the stapler and fired. This created a posterior anastomosis and a common enterotomy anteriorly. I then used a running 2-0 V-Loc absorbable suture to close the common enterotomy. I used the first stitch and started from the posterior and sewed anteriorly. This suture was done in a running full-thickness fashion. Then, started on the anterior side and ran this stitch posteriorly again in a running full-thickness fashion. They met in the middle and the 2 sides were tied. I then did a second layer of the anastomosis using 3-0 PDS. This was done in a Lembert fashion. I did 4 sutures until the entirety of the anterior portion of the anastomosis was sutured. Prior to doing the suture, I had asked the anesthesiologist to advance the NG tube and advanced without difficulty from the esophagus into the gastric conduit. With the anastomosis complete, I inspected for bleeding. None was seen. I then placed the specimen in an anchor bag and removed it from the chest.  The posterior inferior-most incision had to be enlarged for this purpose. Once it was out, I placed a 24-Occitan Michael drain in the posterior mediastinum. Finally, I asked the anesthesiologist to reinflate the lung. It reinflated well and filled the chest space. I then withdrew my camera, all instruments and trocars, and closed remaining incisions in 2 layers with a layer of 2-0 Vicryl and 1 of 4-0 Monocryl with exception of the posterior inferior-most incision which was closed in 3 layers with a layer of 0 Vicryl through the latissimus, 2-0 Vicryl in the deep dermal layer and finally 4-0 Monocryl subcutaneous stitch. The patient tolerated the procedure well and I was present for the entirety of my portion of the procedure. Dictated By Diannia Pass Valarie Mcardle, MD  d: 09/30/2022 20:09:18  t: 09/30/2022 22:53:41  Job [id number]  JLL/

## 2022-10-02 LAB
ALBUMIN SERPL-MCNC: 2.9 G/DL (ref 3.4–5)
ALBUMIN/GLOB SERPL: 0.8 {RATIO} (ref 1–2)
ALP LIVER SERPL-CCNC: 54 U/L
ALT SERPL-CCNC: 153 U/L
ANION GAP SERPL CALC-SCNC: 3 MMOL/L (ref 0–18)
AST SERPL-CCNC: 90 U/L (ref 15–37)
BILIRUB SERPL-MCNC: 0.4 MG/DL (ref 0.1–2)
BLOOD TYPE BARCODE: 5100
BUN BLD-MCNC: 25 MG/DL (ref 7–18)
CALCIUM BLD-MCNC: 8.7 MG/DL (ref 8.5–10.1)
CHLORIDE SERPL-SCNC: 107 MMOL/L (ref 98–112)
CO2 SERPL-SCNC: 29 MMOL/L (ref 21–32)
CREAT BLD-MCNC: 0.77 MG/DL
ERYTHROCYTE [DISTWIDTH] IN BLOOD BY AUTOMATED COUNT: 13.9 %
GFR SERPLBLD BASED ON 1.73 SQ M-ARVRAT: 108 ML/MIN/1.73M2 (ref 60–?)
GLOBULIN PLAS-MCNC: 3.5 G/DL (ref 2.8–4.4)
GLUCOSE BLD-MCNC: 100 MG/DL (ref 70–99)
GLUCOSE BLD-MCNC: 100 MG/DL (ref 70–99)
GLUCOSE BLD-MCNC: 127 MG/DL (ref 70–99)
HCT VFR BLD AUTO: 33.2 %
HGB BLD-MCNC: 10.7 G/DL
MAGNESIUM SERPL-MCNC: 2.1 MG/DL (ref 1.6–2.6)
MCH RBC QN AUTO: 32.9 PG (ref 26–34)
MCHC RBC AUTO-ENTMCNC: 32.2 G/DL (ref 31–37)
MCV RBC AUTO: 102.2 FL
OSMOLALITY SERPL CALC.SUM OF ELEC: 294 MOSM/KG (ref 275–295)
PHOSPHATE SERPL-MCNC: 2.3 MG/DL (ref 2.5–4.9)
PLATELET # BLD AUTO: 141 10(3)UL (ref 150–450)
POTASSIUM SERPL-SCNC: 4 MMOL/L (ref 3.5–5.1)
PROT SERPL-MCNC: 6.4 G/DL (ref 6.4–8.2)
RBC # BLD AUTO: 3.25 X10(6)UL
SODIUM SERPL-SCNC: 139 MMOL/L (ref 136–145)
WBC # BLD AUTO: 8.6 X10(3) UL (ref 4–11)

## 2022-10-02 RX ORDER — ACETAMINOPHEN 160 MG/5ML
1000 SOLUTION ORAL EVERY 6 HOURS
Status: DISCONTINUED | OUTPATIENT
Start: 2022-10-02 | End: 2022-10-07

## 2022-10-02 RX ORDER — OXYCODONE HCL 5 MG/5 ML
5 SOLUTION, ORAL ORAL EVERY 4 HOURS PRN
Status: DISCONTINUED | OUTPATIENT
Start: 2022-10-02 | End: 2022-10-07

## 2022-10-02 RX ORDER — FUROSEMIDE 10 MG/ML
10 INJECTION INTRAMUSCULAR; INTRAVENOUS ONCE
Status: COMPLETED | OUTPATIENT
Start: 2022-10-02 | End: 2022-10-02

## 2022-10-02 NOTE — PLAN OF CARE
Assumed care. Low urine output overnight. Dr. Cait Marsh updated. 1 dose IV lasix given. Urine output increased after lasix. Pain controlled with Ketamine/dilaudid/Ofirmev.  Up in chair this am.

## 2022-10-02 NOTE — PLAN OF CARE
Assumed care at 0700. A & O x 4. RA. Patient reports throat. Pain controlled with IV ketamine gtt, dilaudid, tylenol, and PRN oxycodone. 5 abdominal lap sites C/D/I. R chest tube with serosanguinous drainage. NGT to LIS, brown/ green drainage. Diminished urine output. IV Lasix given. Urine output increased after dose given. Tube feed running at 55 ml/hr. Increasing towards goal rate. Patient updated on plan of care.

## 2022-10-03 LAB
ALBUMIN SERPL-MCNC: 2.9 G/DL (ref 3.4–5)
ALBUMIN/GLOB SERPL: 0.7 {RATIO} (ref 1–2)
ALP LIVER SERPL-CCNC: 59 U/L
ALT SERPL-CCNC: 112 U/L
AMYLASE PLR-CCNC: 17 U/L
ANION GAP SERPL CALC-SCNC: 5 MMOL/L (ref 0–18)
AST SERPL-CCNC: 46 U/L (ref 15–37)
BILIRUB SERPL-MCNC: 0.3 MG/DL (ref 0.1–2)
BUN BLD-MCNC: 18 MG/DL (ref 7–18)
CALCIUM BLD-MCNC: 8.8 MG/DL (ref 8.5–10.1)
CHLORIDE SERPL-SCNC: 106 MMOL/L (ref 98–112)
CO2 SERPL-SCNC: 28 MMOL/L (ref 21–32)
CREAT BLD-MCNC: 0.7 MG/DL
ERYTHROCYTE [DISTWIDTH] IN BLOOD BY AUTOMATED COUNT: 13.5 %
GFR SERPLBLD BASED ON 1.73 SQ M-ARVRAT: 111 ML/MIN/1.73M2 (ref 60–?)
GLOBULIN PLAS-MCNC: 3.9 G/DL (ref 2.8–4.4)
GLUCOSE BLD-MCNC: 105 MG/DL (ref 70–99)
GLUCOSE BLD-MCNC: 112 MG/DL (ref 70–99)
GLUCOSE BLD-MCNC: 113 MG/DL (ref 70–99)
GLUCOSE BLD-MCNC: 113 MG/DL (ref 70–99)
GLUCOSE BLD-MCNC: 123 MG/DL (ref 70–99)
HCT VFR BLD AUTO: 38.5 %
HGB BLD-MCNC: 12.3 G/DL
MAGNESIUM SERPL-MCNC: 2.3 MG/DL (ref 1.6–2.6)
MCH RBC QN AUTO: 33.4 PG (ref 26–34)
MCHC RBC AUTO-ENTMCNC: 31.9 G/DL (ref 31–37)
MCV RBC AUTO: 104.6 FL
OSMOLALITY SERPL CALC.SUM OF ELEC: 291 MOSM/KG (ref 275–295)
PHOSPHATE SERPL-MCNC: 2.8 MG/DL (ref 2.5–4.9)
PLATELET # BLD AUTO: 166 10(3)UL (ref 150–450)
POTASSIUM SERPL-SCNC: 3.7 MMOL/L (ref 3.5–5.1)
PROT SERPL-MCNC: 6.8 G/DL (ref 6.4–8.2)
RBC # BLD AUTO: 3.68 X10(6)UL
SODIUM SERPL-SCNC: 139 MMOL/L (ref 136–145)
WBC # BLD AUTO: 7.5 X10(3) UL (ref 4–11)

## 2022-10-03 RX ORDER — FLUTICASONE PROPIONATE 50 MCG
1 SPRAY, SUSPENSION (ML) NASAL DAILY
Status: DISCONTINUED | OUTPATIENT
Start: 2022-10-03 | End: 2022-10-07

## 2022-10-03 RX ORDER — HYDRALAZINE HYDROCHLORIDE 20 MG/ML
5 INJECTION INTRAMUSCULAR; INTRAVENOUS EVERY 4 HOURS PRN
Status: DISCONTINUED | OUTPATIENT
Start: 2022-10-03 | End: 2022-10-07

## 2022-10-03 RX ORDER — POTASSIUM CHLORIDE 14.9 MG/ML
20 INJECTION INTRAVENOUS ONCE
Status: COMPLETED | OUTPATIENT
Start: 2022-10-03 | End: 2022-10-03

## 2022-10-03 RX ORDER — HYDRALAZINE HYDROCHLORIDE 20 MG/ML
10 INJECTION INTRAMUSCULAR; INTRAVENOUS EVERY 4 HOURS PRN
Status: DISCONTINUED | OUTPATIENT
Start: 2022-10-03 | End: 2022-10-03

## 2022-10-03 NOTE — OPERATIVE REPORT
[address]    PATIENT'S NAME: Jim Hernandez   ATTENDING PHYSICIAN: Stanton Munson. Omer Alvarado MD   OPERATING PHYSICIAN: Stanton Munson. Omer Alvarado MD   PATIENT ACCOUNT#:   [id number]    LOCATION:  [address] [place] [address]  MEDICAL RECORD #:   [id number]       YOB: 1970  ADMISSION DATE:       09/30/2022      OPERATION DATE:  09/30/2022    OPERATIVE REPORT    PREOPERATIVE DIAGNOSIS:  Adenocarcinoma of the distal esophagus. POSTOPERATIVE DIAGNOSIS:  Adenocarcinoma of the distal esophagus. PROCEDURE:    1. Laparoscopic robotic-assisted esophagogastrectomy. 2.   Jejunostomy feeding tube placement. CO-SURGEON:  Sudarshan Iyer MD.    ASSISTANTBrealysha Valles CSA. ANESTHESIA:  General.     INDICATIONS:  Patient is a 48-year-old man who was diagnosed with distal esophageal adenocarcinoma. He presents today for surgical management. The surgical treatment matter had been discussed with him. He had received neoadjuvant chemoradiotherapy. OPERATIVE TECHNIQUE:  The patient was brought to the operating room and was placed in supine position. He was given general anesthesia by the anesthesiology service. Sequential compression boots were placed. Patient received preoperative intravenous antibiotic prophylaxis. He was prepped and draped in normal sterile fashion. The initial access and robotic docking of this procedure were performed by my partner, Dr. J Luis Daniels, who accessed the abdomen via Veress needle from the left upper quadrant entry site with pneumo-insufflation of carbon dioxide to 15 mmHg pressure was then attained. Using Optiport periumbilically, the abdomen was entered under direct visualization with further 2 robotic trocars on the left side and one 12 mm trocar on the right side with a further assist port via AirSeal in the right lower abdomen. Robotic arms were docked and, at this point, I presented and proceeded with the entire procedure.   I evaluated the abdomen, revealing no evidence for metastatic disease. Of note, a snake retractor had been placed from a 5 mm trocar in the right lateral abdomen. Patient has central obesity that rendered surgery more difficult than anticipated. However, initially, I did dissection at the hiatus identifying the intra-abdominal esophagus. There was dense reaction from that location secondary to treatment effect. The distal esophagus was identified. It was dissected free of surrounding tissue and circumferentially mobilized. I then mobilized the greater curvature of the stomach maintaining the right gastroepiploic vessels intact. This was carried out to the hiatus. Within the lesser sac, the lesser omentum was bulky and mobilized and , dissecting the lesser curvature toward the hiatus and intra-abdominal esophagus location. I then performed posterior dissection  fibers to the pancreas itself and mobilizing the stomach posteriorly with left gastric vessels identified. Dissection was carried out to mobilize normal-appearing lymph nodes in the vicinity and vessels were divided using vessel sealant or bipolar energy device. At this time, the entire stomach was mobilized and I proceeded with tubulizing it from the lesser curve using staplers enforced with Seamguard. The upper stomach was divided at a viable location and re-sutured to the remaining specimen using 2-0 silk sutures placed in U-stitch in a figure-of-eight fashion. I then identified the jejunum and grasped it. The 12 mm trocar site was reapproximated using 0 Vicryl suture. I proceeded with a small midline incision through which I performed a J-tube with double pursestring sutures using 3-0 silk and placing a 12 DAYANA tube into the small bowel beyond enterotomy which was tied and tacked to the anterior abdominal wall with further tacking sutures. This was inspected for leak and none was noted. There was a good flush within the system.   The tube was stitched in place using a 3-0 nylon suture. The fascia was then reapproximated using #1 PDS. Skin was stapled. Sterile dressings were then applied. The patient tolerated the procedure well without immediate complications. He was extubated in the operating room and was sent in stable condition to recovery room. Counts were correct. I was present throughout the procedure. Dictated By Hugo Connolly MD  d: 10/01/2022 13:32:46  t: 10/01/2022 22:57:26  [facility] [id number]  hospitals/

## 2022-10-03 NOTE — PLAN OF CARE
Assumed care of pt at 0730  Pt urine output adequate  PCA decreased, oxy started  Pain controlled, flonase started  Ketamine to stop tonight  Chest tube to water seal  Amylase sent  K replaced per protocol  Ambulated in halls, up to chair

## 2022-10-04 LAB
ALBUMIN SERPL-MCNC: 2.9 G/DL (ref 3.4–5)
ALBUMIN/GLOB SERPL: 0.7 {RATIO} (ref 1–2)
ALP LIVER SERPL-CCNC: 68 U/L
ALT SERPL-CCNC: 84 U/L
AMYLASE PLR-CCNC: 18 U/L
ANION GAP SERPL CALC-SCNC: 7 MMOL/L (ref 0–18)
AST SERPL-CCNC: 29 U/L (ref 15–37)
BILIRUB SERPL-MCNC: 0.3 MG/DL (ref 0.1–2)
BUN BLD-MCNC: 17 MG/DL (ref 7–18)
CALCIUM BLD-MCNC: 9.3 MG/DL (ref 8.5–10.1)
CHLORIDE SERPL-SCNC: 107 MMOL/L (ref 98–112)
CO2 SERPL-SCNC: 27 MMOL/L (ref 21–32)
CREAT BLD-MCNC: 0.68 MG/DL
GFR SERPLBLD BASED ON 1.73 SQ M-ARVRAT: 112 ML/MIN/1.73M2 (ref 60–?)
GLOBULIN PLAS-MCNC: 4.2 G/DL (ref 2.8–4.4)
GLUCOSE BLD-MCNC: 102 MG/DL (ref 70–99)
GLUCOSE BLD-MCNC: 105 MG/DL (ref 70–99)
GLUCOSE BLD-MCNC: 112 MG/DL (ref 70–99)
GLUCOSE BLD-MCNC: 133 MG/DL (ref 70–99)
GLUCOSE BLD-MCNC: 157 MG/DL (ref 70–99)
GLUCOSE BLD-MCNC: 96 MG/DL (ref 70–99)
MAGNESIUM SERPL-MCNC: 2.2 MG/DL (ref 1.6–2.6)
OSMOLALITY SERPL CALC.SUM OF ELEC: 295 MOSM/KG (ref 275–295)
PHOSPHATE SERPL-MCNC: 3.8 MG/DL (ref 2.5–4.9)
POTASSIUM SERPL-SCNC: 3.9 MMOL/L (ref 3.5–5.1)
POTASSIUM SERPL-SCNC: 3.9 MMOL/L (ref 3.5–5.1)
PROT SERPL-MCNC: 7.1 G/DL (ref 6.4–8.2)
SARS-COV-2 RNA RESP QL NAA+PROBE: DETECTED
SODIUM SERPL-SCNC: 141 MMOL/L (ref 136–145)
VIT B12 SERPL-MCNC: 754 PG/ML (ref 193–986)

## 2022-10-04 PROCEDURE — 99232 SBSQ HOSP IP/OBS MODERATE 35: CPT | Performed by: HOSPITALIST

## 2022-10-04 RX ORDER — MELATONIN
3 NIGHTLY PRN
Status: DISCONTINUED | OUTPATIENT
Start: 2022-10-04 | End: 2022-10-07

## 2022-10-04 RX ORDER — HYDROMORPHONE HYDROCHLORIDE 1 MG/ML
0.4 INJECTION, SOLUTION INTRAMUSCULAR; INTRAVENOUS; SUBCUTANEOUS EVERY 2 HOUR PRN
Status: DISCONTINUED | OUTPATIENT
Start: 2022-10-04 | End: 2022-10-07

## 2022-10-04 RX ORDER — HYDROMORPHONE HYDROCHLORIDE 1 MG/ML
0.2 INJECTION, SOLUTION INTRAMUSCULAR; INTRAVENOUS; SUBCUTANEOUS EVERY 2 HOUR PRN
Status: DISCONTINUED | OUTPATIENT
Start: 2022-10-04 | End: 2022-10-07

## 2022-10-04 NOTE — CM/SW NOTE
Department  notified of request for yossi Diaz referrals started. Assigned CM/SW to follow up with pt/family on further discharge planning.      Kristopher Gurrola  [facility]

## 2022-10-04 NOTE — PLAN OF CARE
Assumed care of pt at 0730  Pt ambulated in halls twice, with PT and nurse  Dilaudid PCA discontinued  Port accessed  Pt reports heartburn after ambulating  Pain controlled with oxycodone/tylenol via J-tube  Meets urine output parameters  Upper GI tomorrow  Tube feeds off at midnight  NG to intermittent suction  CT to water seal

## 2022-10-04 NOTE — CM/SW NOTE
MARIZA reviewed [facility] referral, Blanca Robbins able to accept for [facility] RN needs. SW started INF referral for j tube feeds. Awaiting response. MARIZA/PAUL to remain available for dc plans.      BENY Mace  Discharge 2011 [facility]

## 2022-10-05 ENCOUNTER — APPOINTMENT (OUTPATIENT)
Dept: GENERAL RADIOLOGY | Facility: HOSPITAL | Age: 52
DRG: 326 | End: 2022-10-05
Attending: INTERNAL MEDICINE
Payer: COMMERCIAL

## 2022-10-05 ENCOUNTER — APPOINTMENT (OUTPATIENT)
Dept: GENERAL RADIOLOGY | Facility: HOSPITAL | Age: 52
DRG: 326 | End: 2022-10-05
Attending: PHYSICIAN ASSISTANT
Payer: COMMERCIAL

## 2022-10-05 LAB
ALBUMIN SERPL-MCNC: 3 G/DL (ref 3.4–5)
ALBUMIN/GLOB SERPL: 0.7 {RATIO} (ref 1–2)
ALP LIVER SERPL-CCNC: 73 U/L
ALT SERPL-CCNC: 71 U/L
AMYLASE PLR-CCNC: 15 U/L
ANION GAP SERPL CALC-SCNC: 4 MMOL/L (ref 0–18)
AST SERPL-CCNC: 26 U/L (ref 15–37)
BILIRUB SERPL-MCNC: 0.5 MG/DL (ref 0.1–2)
BUN BLD-MCNC: 15 MG/DL (ref 7–18)
CALCIUM BLD-MCNC: 9.6 MG/DL (ref 8.5–10.1)
CHLORIDE SERPL-SCNC: 104 MMOL/L (ref 98–112)
CO2 SERPL-SCNC: 29 MMOL/L (ref 21–32)
CREAT BLD-MCNC: 0.69 MG/DL
ERYTHROCYTE [DISTWIDTH] IN BLOOD BY AUTOMATED COUNT: 13.2 %
GFR SERPLBLD BASED ON 1.73 SQ M-ARVRAT: 111 ML/MIN/1.73M2 (ref 60–?)
GLOBULIN PLAS-MCNC: 4.2 G/DL (ref 2.8–4.4)
GLUCOSE BLD-MCNC: 118 MG/DL (ref 70–99)
GLUCOSE BLD-MCNC: 141 MG/DL (ref 70–99)
GLUCOSE BLD-MCNC: 86 MG/DL (ref 70–99)
HCT VFR BLD AUTO: 35.9 %
HGB BLD-MCNC: 11.5 G/DL
MAGNESIUM SERPL-MCNC: 1.9 MG/DL (ref 1.6–2.6)
MCH RBC QN AUTO: 32.3 PG (ref 26–34)
MCHC RBC AUTO-ENTMCNC: 32 G/DL (ref 31–37)
MCV RBC AUTO: 100.8 FL
OSMOLALITY SERPL CALC.SUM OF ELEC: 286 MOSM/KG (ref 275–295)
PHOSPHATE SERPL-MCNC: 3.7 MG/DL (ref 2.5–4.9)
PLATELET # BLD AUTO: 192 10(3)UL (ref 150–450)
POTASSIUM SERPL-SCNC: 4.1 MMOL/L (ref 3.5–5.1)
PROT SERPL-MCNC: 7.2 G/DL (ref 6.4–8.2)
RBC # BLD AUTO: 3.56 X10(6)UL
SODIUM SERPL-SCNC: 137 MMOL/L (ref 136–145)
WBC # BLD AUTO: 8.1 X10(3) UL (ref 4–11)

## 2022-10-05 PROCEDURE — 71045 X-RAY EXAM CHEST 1 VIEW: CPT | Performed by: INTERNAL MEDICINE

## 2022-10-05 PROCEDURE — 99232 SBSQ HOSP IP/OBS MODERATE 35: CPT | Performed by: INTERNAL MEDICINE

## 2022-10-05 PROCEDURE — 74220 X-RAY XM ESOPHAGUS 1CNTRST: CPT | Performed by: PHYSICIAN ASSISTANT

## 2022-10-05 PROCEDURE — XW033E5 INTRODUCTION OF REMDESIVIR ANTI-INFECTIVE INTO PERIPHERAL VEIN, PERCUTANEOUS APPROACH, NEW TECHNOLOGY GROUP 5: ICD-10-PCS | Performed by: INTERNAL MEDICINE

## 2022-10-05 RX ORDER — BENZONATATE 100 MG/1
100 CAPSULE ORAL 3 TIMES DAILY PRN
Status: DISCONTINUED | OUTPATIENT
Start: 2022-10-05 | End: 2022-10-07

## 2022-10-05 NOTE — PLAN OF CARE
Assumed care at [address].    A&Ox4, RA, NSR on taras, no new deficits   NG to LIS   CT to water seal  TF stopped at midnight   Rapid covid done, md aware of results; isolation in place and ID consulted   Call light within reach    Patient updated on plan of care

## 2022-10-05 NOTE — CM/SW NOTE
Pt discussed in RN rounds with Dr. Moshe Wilkerson. Pt tested positive for covid yesterday, plans for an upper GI today. Pt on isolation precautions. Pt will dc with Erwin MYERS, [facility] for Tube Feeds. SW to send updates/orders when available.      BENY Garcia  Discharge 2011 [facility]

## 2022-10-06 LAB
ALBUMIN SERPL-MCNC: 3.1 G/DL (ref 3.4–5)
ALBUMIN/GLOB SERPL: 0.7 {RATIO} (ref 1–2)
ALP LIVER SERPL-CCNC: 70 U/L
ALT SERPL-CCNC: 55 U/L
AMYLASE PLR-CCNC: 18 U/L
ANION GAP SERPL CALC-SCNC: 6 MMOL/L (ref 0–18)
AST SERPL-CCNC: 17 U/L (ref 15–37)
BASOPHILS # BLD AUTO: 0.07 X10(3) UL (ref 0–0.2)
BASOPHILS NFR BLD AUTO: 0.7 %
BILIRUB SERPL-MCNC: 0.5 MG/DL (ref 0.1–2)
BUN BLD-MCNC: 21 MG/DL (ref 7–18)
CALCIUM BLD-MCNC: 9.6 MG/DL (ref 8.5–10.1)
CHLORIDE SERPL-SCNC: 103 MMOL/L (ref 98–112)
CO2 SERPL-SCNC: 27 MMOL/L (ref 21–32)
CREAT BLD-MCNC: 0.7 MG/DL
EOSINOPHIL # BLD AUTO: 0.1 X10(3) UL (ref 0–0.7)
EOSINOPHIL NFR BLD AUTO: 1 %
ERYTHROCYTE [DISTWIDTH] IN BLOOD BY AUTOMATED COUNT: 13.3 %
GFR SERPLBLD BASED ON 1.73 SQ M-ARVRAT: 111 ML/MIN/1.73M2 (ref 60–?)
GLOBULIN PLAS-MCNC: 4.4 G/DL (ref 2.8–4.4)
GLUCOSE BLD-MCNC: 127 MG/DL (ref 70–99)
GLUCOSE BLD-MCNC: 128 MG/DL (ref 70–99)
GLUCOSE BLD-MCNC: 92 MG/DL (ref 70–99)
HCT VFR BLD AUTO: 37 %
HGB BLD-MCNC: 11.8 G/DL
IMM GRANULOCYTES # BLD AUTO: 0.19 X10(3) UL (ref 0–1)
IMM GRANULOCYTES NFR BLD: 1.8 %
LYMPHOCYTES # BLD AUTO: 0.73 X10(3) UL (ref 1–4)
LYMPHOCYTES NFR BLD AUTO: 7.1 %
MAGNESIUM SERPL-MCNC: 2.2 MG/DL (ref 1.6–2.6)
MCH RBC QN AUTO: 32.2 PG (ref 26–34)
MCHC RBC AUTO-ENTMCNC: 31.9 G/DL (ref 31–37)
MCV RBC AUTO: 101.1 FL
MONOCYTES # BLD AUTO: 1.17 X10(3) UL (ref 0.1–1)
MONOCYTES NFR BLD AUTO: 11.3 %
NEUTROPHILS # BLD AUTO: 8.08 X10 (3) UL (ref 1.5–7.7)
NEUTROPHILS # BLD AUTO: 8.08 X10(3) UL (ref 1.5–7.7)
NEUTROPHILS NFR BLD AUTO: 78.1 %
OSMOLALITY SERPL CALC.SUM OF ELEC: 287 MOSM/KG (ref 275–295)
PHOSPHATE SERPL-MCNC: 4.2 MG/DL (ref 2.5–4.9)
PLATELET # BLD AUTO: 227 10(3)UL (ref 150–450)
POTASSIUM SERPL-SCNC: 4.1 MMOL/L (ref 3.5–5.1)
PROT SERPL-MCNC: 7.5 G/DL (ref 6.4–8.2)
RBC # BLD AUTO: 3.66 X10(6)UL
SODIUM SERPL-SCNC: 136 MMOL/L (ref 136–145)
WBC # BLD AUTO: 10.3 X10(3) UL (ref 4–11)

## 2022-10-06 PROCEDURE — 99232 SBSQ HOSP IP/OBS MODERATE 35: CPT | Performed by: INTERNAL MEDICINE

## 2022-10-06 RX ORDER — FOLIC ACID 1 MG/1
1 TABLET ORAL DAILY
Status: DISCONTINUED | OUTPATIENT
Start: 2022-10-06 | End: 2022-10-07

## 2022-10-06 RX ORDER — ATORVASTATIN CALCIUM 20 MG/1
20 TABLET, FILM COATED ORAL DAILY
Status: DISCONTINUED | OUTPATIENT
Start: 2022-10-06 | End: 2022-10-07

## 2022-10-06 RX ORDER — METOPROLOL SUCCINATE 50 MG/1
50 TABLET, EXTENDED RELEASE ORAL DAILY
Status: DISCONTINUED | OUTPATIENT
Start: 2022-10-06 | End: 2022-10-07

## 2022-10-06 NOTE — PLAN OF CARE
Assumed patient care 0730  Patient alert and oriented X4  On room air, BP elevated with parameters, given scheduled metoprolol, NSR/ST on tele  Patient c/o pain 8-10/10 to throat and abdomen, given PRN dilaudid and oxy with relief, refusing oral tylenol  Upgraded to full liquid diet, tolerating well, denies any nausea/bloating  J tube with TF infusing per order  J tube tip wrapped with tape d/t tear in tip of catheter, MD notified, endorsed to [address] [facility] shift RN to stop TF if J tube is leaking   CT to water seal, intact, dressing changed, guaze and Tegaderm C/D/I   Cardiothoracic aware of small pneumo, no new orders  Voiding per urinal, 2 small formed BM this shift  Patient updated on plan of care     Problem: CARDIOVASCULAR - ADULT  Goal: Maintains optimal cardiac output and hemodynamic stability  Description: INTERVENTIONS:  - Monitor vital signs, rhythm, and trends  - Monitor for bleeding, hypotension and signs of decreased cardiac output  - Evaluate effectiveness of vasoactive medications to optimize hemodynamic stability  - Monitor arterial and/or venous puncture sites for bleeding and/or hematoma  - Assess quality of pulses, skin color and temperature  - Assess for signs of decreased coronary artery perfusion - ex.  Angina  - Evaluate fluid balance, assess for edema, trend weights  Outcome: Progressing     Problem: GASTROINTESTINAL - ADULT  Goal: Minimal or absence of nausea and vomiting  Description: INTERVENTIONS:  - Maintain adequate hydration with IV or PO as ordered and tolerated  - Nasogastric tube to low intermittent suction as ordered  - Evaluate effectiveness of ordered antiemetic medications  - Provide nonpharmacologic comfort measures as appropriate  - Advance diet as tolerated, if ordered  - Obtain nutritional consult as needed  - Evaluate fluid balance  Outcome: Progressing  Goal: Maintains or returns to baseline bowel function  Description: INTERVENTIONS:  - Assess bowel function  - Maintain adequate hydration with IV or PO as ordered and tolerated  - Evaluate effectiveness of GI medications  - Encourage mobilization and activity  - Obtain nutritional consult as needed  - Establish a toileting routine/schedule  - Consider collaborating with pharmacy to review patient's medication profile  Outcome: Progressing  Goal: Maintains adequate nutritional intake (undernourished)  Description: INTERVENTIONS:  - Monitor percentage of each meal consumed  - Identify factors contributing to decreased intake, treat as appropriate  - Assist with meals as needed  - Monitor I&O, WT and lab values  - Obtain nutritional consult as needed  - Optimize oral hygiene and moisture  - Encourage food from home; allow for food preferences  - Enhance eating environment  Outcome: Progressing

## 2022-10-06 NOTE — PLAN OF CARE
Assumed patient care 0730  Patient alert and oriented X4  On room air, BP elevated with parameters, NSR/ST on tele  Patient c/o pain 8-10/10 to throat and abdomen, given PRN dilaudid and scheduled tylenol with some relief   NPO maintained for UGI, upgraded to clear liquids, tolerating well  NG to LIS removed per order  J tube with dressing C/D/I  CT to water seal, intact, ABD dressing reinforced   Voiding per urinal, no BM this shift  Patient updated on plan of care     Problem: CARDIOVASCULAR - ADULT  Goal: Maintains optimal cardiac output and hemodynamic stability  Description: INTERVENTIONS:  - Monitor vital signs, rhythm, and trends  - Monitor for bleeding, hypotension and signs of decreased cardiac output  - Evaluate effectiveness of vasoactive medications to optimize hemodynamic stability  - Monitor arterial and/or venous puncture sites for bleeding and/or hematoma  - Assess quality of pulses, skin color and temperature  - Assess for signs of decreased coronary artery perfusion - ex.  Angina  - Evaluate fluid balance, assess for edema, trend weights  Outcome: Progressing     Problem: GASTROINTESTINAL - ADULT  Goal: Minimal or absence of nausea and vomiting  Description: INTERVENTIONS:  - Maintain adequate hydration with IV or PO as ordered and tolerated  - Nasogastric tube to low intermittent suction as ordered  - Evaluate effectiveness of ordered antiemetic medications  - Provide nonpharmacologic comfort measures as appropriate  - Advance diet as tolerated, if ordered  - Obtain nutritional consult as needed  - Evaluate fluid balance  Outcome: Progressing  Goal: Maintains or returns to baseline bowel function  Description: INTERVENTIONS:  - Assess bowel function  - Maintain adequate hydration with IV or PO as ordered and tolerated  - Evaluate effectiveness of GI medications  - Encourage mobilization and activity  - Obtain nutritional consult as needed  - Establish a toileting routine/schedule  - Consider collaborating with pharmacy to review patient's medication profile  Outcome: Progressing  Goal: Maintains adequate nutritional intake (undernourished)  Description: INTERVENTIONS:  - Monitor percentage of each meal consumed  - Identify factors contributing to decreased intake, treat as appropriate  - Assist with meals as needed  - Monitor I&O, WT and lab values  - Obtain nutritional consult as needed  - Optimize oral hygiene and moisture  - Encourage food from home; allow for food preferences  - Enhance eating environment  Outcome: Progressing

## 2022-10-06 NOTE — PLAN OF CARE
Received care @ 1930  A&Ox4  RA, NS/ST on tele  Productive cough; Adama @ bedside    Tube feeds advancing per order w/ Q4 water flushes  CT to water seal w/ minimal SS output  Tolerating clear liquid diet  Dilaudid PRN for pain   2x BM       Updated on POC Skyrizi Counseling: I discussed with the patient the risks of risankizumab-rzaa including but not limited to immunosuppression, and serious infections.  The patient understands that monitoring is required including a PPD at baseline and must alert us or the primary physician if symptoms of infection or other concerning signs are noted.

## 2022-10-07 ENCOUNTER — APPOINTMENT (OUTPATIENT)
Dept: INTERVENTIONAL RADIOLOGY/VASCULAR | Facility: HOSPITAL | Age: 52
DRG: 326 | End: 2022-10-07
Attending: PHYSICIAN ASSISTANT
Payer: COMMERCIAL

## 2022-10-07 VITALS
OXYGEN SATURATION: 94 % | WEIGHT: 286.31 LBS | SYSTOLIC BLOOD PRESSURE: 179 MMHG | HEART RATE: 102 BPM | DIASTOLIC BLOOD PRESSURE: 94 MMHG | TEMPERATURE: 98 F | BODY MASS INDEX: 36.74 KG/M2 | RESPIRATION RATE: 18 BRPM | HEIGHT: 74 IN

## 2022-10-07 LAB
ALBUMIN SERPL-MCNC: 3 G/DL (ref 3.4–5)
ALBUMIN/GLOB SERPL: 0.8 {RATIO} (ref 1–2)
ALP LIVER SERPL-CCNC: 69 U/L
ALT SERPL-CCNC: 44 U/L
AMYLASE PLR-CCNC: 21 U/L
ANION GAP SERPL CALC-SCNC: 7 MMOL/L (ref 0–18)
AST SERPL-CCNC: 16 U/L (ref 15–37)
BILIRUB SERPL-MCNC: 0.6 MG/DL (ref 0.1–2)
BUN BLD-MCNC: 21 MG/DL (ref 7–18)
CALCIUM BLD-MCNC: 9.1 MG/DL (ref 8.5–10.1)
CHLORIDE SERPL-SCNC: 99 MMOL/L (ref 98–112)
CO2 SERPL-SCNC: 28 MMOL/L (ref 21–32)
CREAT BLD-MCNC: 0.79 MG/DL
GFR SERPLBLD BASED ON 1.73 SQ M-ARVRAT: 107 ML/MIN/1.73M2 (ref 60–?)
GLOBULIN PLAS-MCNC: 4 G/DL (ref 2.8–4.4)
GLUCOSE BLD-MCNC: 109 MG/DL (ref 70–99)
GLUCOSE BLD-MCNC: 118 MG/DL (ref 70–99)
GLUCOSE BLD-MCNC: 95 MG/DL (ref 70–99)
GLUCOSE BLD-MCNC: 97 MG/DL (ref 70–99)
OSMOLALITY SERPL CALC.SUM OF ELEC: 282 MOSM/KG (ref 275–295)
POTASSIUM SERPL-SCNC: 4.1 MMOL/L (ref 3.5–5.1)
PROT SERPL-MCNC: 7 G/DL (ref 6.4–8.2)
SODIUM SERPL-SCNC: 134 MMOL/L (ref 136–145)

## 2022-10-07 PROCEDURE — 0D2DXUZ CHANGE FEEDING DEVICE IN LOWER INTESTINAL TRACT, EXTERNAL APPROACH: ICD-10-PCS | Performed by: RADIOLOGY

## 2022-10-07 PROCEDURE — 99232 SBSQ HOSP IP/OBS MODERATE 35: CPT | Performed by: INTERNAL MEDICINE

## 2022-10-07 RX ORDER — OXYCODONE HYDROCHLORIDE 5 MG/1
5 TABLET ORAL EVERY 6 HOURS PRN
Qty: 20 TABLET | Refills: 0 | Status: SHIPPED | OUTPATIENT
Start: 2022-10-07

## 2022-10-07 RX ORDER — DEXTROSE, SODIUM CHLORIDE, AND POTASSIUM CHLORIDE 5; .45; .15 G/100ML; G/100ML; G/100ML
INJECTION INTRAVENOUS CONTINUOUS
Status: DISCONTINUED | OUTPATIENT
Start: 2022-10-07 | End: 2022-10-07

## 2022-10-07 RX ORDER — BENZONATATE 100 MG/1
100 CAPSULE ORAL 3 TIMES DAILY PRN
Qty: 15 CAPSULE | Refills: 0 | Status: SHIPPED | OUTPATIENT
Start: 2022-10-07

## 2022-10-07 RX ORDER — ENOXAPARIN SODIUM 100 MG/ML
40 INJECTION SUBCUTANEOUS DAILY
Qty: 14 EACH | Refills: 0 | Status: SHIPPED | OUTPATIENT
Start: 2022-10-08

## 2022-10-07 RX ORDER — LIDOCAINE HYDROCHLORIDE 10 MG/ML
INJECTION, SOLUTION INFILTRATION; PERINEURAL
Status: COMPLETED
Start: 2022-10-07 | End: 2022-10-07

## 2022-10-07 RX ORDER — FAMOTIDINE 20 MG/1
20 TABLET, FILM COATED ORAL 2 TIMES DAILY
Qty: 14 TABLET | Refills: 0 | Status: SHIPPED | OUTPATIENT
Start: 2022-10-07 | End: 2022-10-14

## 2022-10-07 RX ORDER — MIDAZOLAM HYDROCHLORIDE 1 MG/ML
INJECTION INTRAMUSCULAR; INTRAVENOUS
Status: COMPLETED
Start: 2022-10-07 | End: 2022-10-07

## 2022-10-07 RX ORDER — FOLIC ACID 1 MG/1
1 TABLET ORAL DAILY
Qty: 30 TABLET | Refills: 1 | Status: SHIPPED | OUTPATIENT
Start: 2022-10-08

## 2022-10-07 NOTE — PROCEDURES
[facility]  Procedure Note    Gael Guillory. Patient Status:  Inpatient    10/15/1970 MRN [id number]   [facility] 7NE-A Attending Ursula Hager MD   Hosp Day # 7 PCP Lakhwinder Rodriguez MD     Procedure: Mohit Pimentel exchange/check    Pre-Procedure Diagnosis:  Malfunctioning tube    Post-Procedure Diagnosis: Same    Anesthesia:  Local and Sedation    Findings:  Tube check demonstrates cracked hub of catheter, difficult to inject contrast.  Contrast injection confirms intraluminal positioning. Tube removed over stiff glide wire placed in small bowel. Exchange made for new 12F, 20 cm catheter, secured to skin.     Specimens: None    Blood Loss:  Minimal    Tourniquet Time: None  Complications:  None  Drains:  As above    Secondary Diagnosis:  None    Juan Manuel Saucedo MD  10/7/2022

## 2022-10-07 NOTE — PLAN OF CARE
Assumed care around 0730. AxOx4. NSR on tele, RA, VSS. Chest tube removed today. J tube replaced in IR. Pt c/o pain 5-6/10, PRN Oxycodone given with relief. Remdesivir completed today. All consults cleared for discharge. Discharge instructions reviewed with patient. Port de-accessed. Midline removed. Tele removed. Discharging home with son via private vehicle.

## 2022-10-07 NOTE — PLAN OF CARE
Received pt care @ 1930  A&Ox4  RA, NS/ST on tele  CT to water seal w/ very minimal output; w/ SS output; dressing in place   TF stopped per MD request; J tube leaking   Refusing scheduled Tylenol   Tolerating full liquids  Voiding via urinal   Oxy 1x for pain     Updated on POC

## 2022-10-08 NOTE — PAYOR COMM NOTE
Discharge Notification    Patient Name: Olivia Mcneil  Payor: Josh BALLARD  Subscriber #: [id number]  Authorization Number: [id number]  Admit Date/Time: 9/30/2022 7:26 AM  Discharge Date/Time: 10/7/2022 4:19 PM

## 2022-10-10 ENCOUNTER — NURSE ONLY (OUTPATIENT)
Dept: SURGERY | Facility: CLINIC | Age: 52
End: 2022-10-10
Payer: COMMERCIAL

## 2022-10-10 ENCOUNTER — TELEPHONE (OUTPATIENT)
Dept: FAMILY MEDICINE CLINIC | Facility: CLINIC | Age: 52
End: 2022-10-10

## 2022-10-10 ENCOUNTER — PATIENT OUTREACH (OUTPATIENT)
Dept: CASE MANAGEMENT | Age: 52
End: 2022-10-10

## 2022-10-10 ENCOUNTER — TELEPHONE (OUTPATIENT)
Dept: SURGERY | Facility: CLINIC | Age: 52
End: 2022-10-10

## 2022-10-10 DIAGNOSIS — C15.5 MALIGNANT NEOPLASM OF LOWER THIRD OF ESOPHAGUS (HCC): ICD-10-CM

## 2022-10-10 DIAGNOSIS — Z02.9 ENCOUNTERS FOR UNSPECIFIED ADMINISTRATIVE PURPOSE: ICD-10-CM

## 2022-10-10 NOTE — TELEPHONE ENCOUNTER
ROCIO, Spoke to pt for TCM today. Pt declined to schedule with PCP at this time stating that he will need to find someone to bring him to the appointment first.  TCM/HFU appt recommended by 10/21/22 as pt is a moderate risk for readmission.        BOOK BY DATE (last date for TCM): 10/21/22

## 2022-10-10 NOTE — PROGRESS NOTES
Pt came in today for clogged J tube and nutrition questions. J tube was unclogged and we discussed water flushes during tube feedings. Pt stated home health only discussed with him flushing before and after tube feeds. I let him know he needed to flush before, after and every 4 hours with water. Pt spouse also concerned about amount of tube feed and I informed patient his goal is 130 ml/hr for tube feeds. Pt spouse stated they were only doing 130 ml for 14 hours. Provided patient with dietician goals and informed them to call the office with any further questions.

## 2022-10-10 NOTE — TELEPHONE ENCOUNTER
Called to check on patient after hospital discharge. Pt stated pain is controlled on pain medication. Pt stated is sleeping elevated on wedge pillow and has been doing feedings from 6 pm to 8 am.  Pt stated that he has no nausea, vomiting, or fevers. Pt stated that his j tube is clogged and he was not sure about home health coming back out due to his covid. Pt stated he was reaching out to them today regarding j tube and I offered him visit today at 2 pm to unclog tube if home health cannot come out. Confirmed post op appointment for Thursday with Lisbet Hammer as well.

## 2022-10-10 NOTE — TELEPHONE ENCOUNTER
HFU needed. LOV: 9/14/22 Do you want to see him via VV r/t need for transportation, or is he okay to f/u with HemOnc?

## 2022-10-11 ENCOUNTER — TELEMEDICINE (OUTPATIENT)
Dept: FAMILY MEDICINE CLINIC | Facility: CLINIC | Age: 52
End: 2022-10-11

## 2022-10-11 DIAGNOSIS — I10 ESSENTIAL HYPERTENSION: ICD-10-CM

## 2022-10-11 DIAGNOSIS — E78.5 HYPERLIPIDEMIA WITH TARGET LOW DENSITY LIPOPROTEIN (LDL) CHOLESTEROL LESS THAN 70 MG/DL: Primary | ICD-10-CM

## 2022-10-11 DIAGNOSIS — C15.5 MALIGNANT NEOPLASM OF LOWER THIRD OF ESOPHAGUS (HCC): ICD-10-CM

## 2022-10-11 DIAGNOSIS — E66.01 MORBID OBESITY WITH BMI OF 40.0-44.9, ADULT (HCC): ICD-10-CM

## 2022-10-11 PROCEDURE — 99496 TRANSJ CARE MGMT HIGH F2F 7D: CPT | Performed by: FAMILY MEDICINE

## 2022-10-11 NOTE — TELEPHONE ENCOUNTER
Ok for VV, it has to be audio and visual in order for us to bill for TCM. If patient is not interested okay to follow-up with just heme/onc.

## 2022-10-13 ENCOUNTER — OFFICE VISIT (OUTPATIENT)
Dept: SURGERY | Facility: CLINIC | Age: 52
End: 2022-10-13
Payer: COMMERCIAL

## 2022-10-13 VITALS
DIASTOLIC BLOOD PRESSURE: 96 MMHG | WEIGHT: 257 LBS | HEART RATE: 95 BPM | OXYGEN SATURATION: 99 % | SYSTOLIC BLOOD PRESSURE: 146 MMHG | BODY MASS INDEX: 33 KG/M2

## 2022-10-13 DIAGNOSIS — C15.5 MALIGNANT NEOPLASM OF LOWER THIRD OF ESOPHAGUS (HCC): Primary | ICD-10-CM

## 2022-10-13 DIAGNOSIS — R05.1 ACUTE COUGH: ICD-10-CM

## 2022-10-13 DIAGNOSIS — Z98.890 POST-OPERATIVE STATE: ICD-10-CM

## 2022-10-13 PROBLEM — K57.32 DIVERTICULITIS LARGE INTESTINE W/O PERFORATION OR ABSCESS W/O BLEEDING: Status: RESOLVED | Noted: 2018-01-02 | Resolved: 2022-10-13

## 2022-10-13 LAB
ALBUMIN SERPL-MCNC: 2.7 G/DL (ref 3.4–5)
ALBUMIN/GLOB SERPL: 0.5 {RATIO} (ref 1–2)
ALP LIVER SERPL-CCNC: 110 U/L
ALT SERPL-CCNC: 54 U/L
ANION GAP SERPL CALC-SCNC: 9 MMOL/L (ref 0–18)
AST SERPL-CCNC: 31 U/L (ref 15–37)
BASOPHILS # BLD AUTO: 0.04 X10(3) UL (ref 0–0.2)
BASOPHILS NFR BLD AUTO: 0.3 %
BILIRUB SERPL-MCNC: 0.6 MG/DL (ref 0.1–2)
BUN BLD-MCNC: 14 MG/DL (ref 7–18)
CALCIUM BLD-MCNC: 9.5 MG/DL (ref 8.5–10.1)
CHLORIDE SERPL-SCNC: 103 MMOL/L (ref 98–112)
CO2 SERPL-SCNC: 24 MMOL/L (ref 21–32)
CREAT BLD-MCNC: 0.66 MG/DL
EOSINOPHIL # BLD AUTO: 0.1 X10(3) UL (ref 0–0.7)
EOSINOPHIL NFR BLD AUTO: 0.8 %
ERYTHROCYTE [DISTWIDTH] IN BLOOD BY AUTOMATED COUNT: 13 %
FASTING STATUS PATIENT QL REPORTED: NO
GFR SERPLBLD BASED ON 1.73 SQ M-ARVRAT: 113 ML/MIN/1.73M2 (ref 60–?)
GLOBULIN PLAS-MCNC: 5 G/DL (ref 2.8–4.4)
GLUCOSE BLD-MCNC: 109 MG/DL (ref 70–99)
HCT VFR BLD AUTO: 36.2 %
HGB BLD-MCNC: 11.9 G/DL
IMM GRANULOCYTES # BLD AUTO: 0.21 X10(3) UL (ref 0–1)
IMM GRANULOCYTES NFR BLD: 1.7 %
LYMPHOCYTES # BLD AUTO: 0.63 X10(3) UL (ref 1–4)
LYMPHOCYTES NFR BLD AUTO: 5.2 %
MCH RBC QN AUTO: 32.3 PG (ref 26–34)
MCHC RBC AUTO-ENTMCNC: 32.9 G/DL (ref 31–37)
MCV RBC AUTO: 98.4 FL
MONOCYTES # BLD AUTO: 0.95 X10(3) UL (ref 0.1–1)
MONOCYTES NFR BLD AUTO: 7.8 %
NEUTROPHILS # BLD AUTO: 10.2 X10 (3) UL (ref 1.5–7.7)
NEUTROPHILS # BLD AUTO: 10.2 X10(3) UL (ref 1.5–7.7)
NEUTROPHILS NFR BLD AUTO: 84.2 %
OSMOLALITY SERPL CALC.SUM OF ELEC: 283 MOSM/KG (ref 275–295)
PLATELET # BLD AUTO: 380 10(3)UL (ref 150–450)
POTASSIUM SERPL-SCNC: 4.2 MMOL/L (ref 3.5–5.1)
PROT SERPL-MCNC: 7.7 G/DL (ref 6.4–8.2)
RBC # BLD AUTO: 3.68 X10(6)UL
SODIUM SERPL-SCNC: 136 MMOL/L (ref 136–145)
WBC # BLD AUTO: 12.1 X10(3) UL (ref 4–11)

## 2022-10-13 PROCEDURE — 80053 COMPREHEN METABOLIC PANEL: CPT | Performed by: PHYSICIAN ASSISTANT

## 2022-10-13 PROCEDURE — 85025 COMPLETE CBC W/AUTO DIFF WBC: CPT | Performed by: PHYSICIAN ASSISTANT

## 2022-10-13 RX ORDER — OXYCODONE HYDROCHLORIDE 5 MG/1
5 TABLET ORAL EVERY 6 HOURS PRN
Qty: 20 TABLET | Refills: 0 | Status: ON HOLD | OUTPATIENT
Start: 2022-10-13

## 2022-10-13 RX ORDER — BENZONATATE 100 MG/1
100 CAPSULE ORAL 3 TIMES DAILY PRN
Qty: 15 CAPSULE | Refills: 0 | Status: ON HOLD | OUTPATIENT
Start: 2022-10-13

## 2022-10-14 ENCOUNTER — TELEPHONE (OUTPATIENT)
Dept: HEMATOLOGY/ONCOLOGY | Facility: HOSPITAL | Age: 52
End: 2022-10-14

## 2022-10-15 ENCOUNTER — APPOINTMENT (OUTPATIENT)
Dept: CT IMAGING | Facility: HOSPITAL | Age: 52
End: 2022-10-15
Attending: EMERGENCY MEDICINE
Payer: COMMERCIAL

## 2022-10-15 ENCOUNTER — APPOINTMENT (OUTPATIENT)
Dept: GENERAL RADIOLOGY | Facility: HOSPITAL | Age: 52
End: 2022-10-15
Attending: EMERGENCY MEDICINE
Payer: COMMERCIAL

## 2022-10-15 ENCOUNTER — HOSPITAL ENCOUNTER (INPATIENT)
Facility: HOSPITAL | Age: 52
LOS: 10 days | Discharge: HOME HEALTH | End: 2022-10-26
Attending: EMERGENCY MEDICINE | Admitting: HOSPITALIST
Payer: COMMERCIAL

## 2022-10-15 DIAGNOSIS — K91.89 ESOPHAGEAL ANASTOMOTIC LEAK: ICD-10-CM

## 2022-10-15 DIAGNOSIS — R07.9 CHEST PAIN OF UNCERTAIN ETIOLOGY: Primary | ICD-10-CM

## 2022-10-15 DIAGNOSIS — C15.9 MALIGNANT NEOPLASM OF ESOPHAGUS, UNSPECIFIED LOCATION (HCC): ICD-10-CM

## 2022-10-15 DIAGNOSIS — J90 PLEURAL EFFUSION: ICD-10-CM

## 2022-10-15 DIAGNOSIS — K21.9 BILE ACID ESOPHAGEAL REFLUX: ICD-10-CM

## 2022-10-15 LAB
ALBUMIN SERPL-MCNC: 2.9 G/DL (ref 3.4–5)
ALBUMIN/GLOB SERPL: 0.6 {RATIO} (ref 1–2)
ALP LIVER SERPL-CCNC: 108 U/L
ALT SERPL-CCNC: 80 U/L
ANION GAP SERPL CALC-SCNC: 8 MMOL/L (ref 0–18)
ANTIBODY SCREEN: NEGATIVE
AST SERPL-CCNC: 41 U/L (ref 15–37)
BASOPHILS # BLD AUTO: 0.02 X10(3) UL (ref 0–0.2)
BASOPHILS NFR BLD AUTO: 0.2 %
BILIRUB SERPL-MCNC: 0.5 MG/DL (ref 0.1–2)
BUN BLD-MCNC: 20 MG/DL (ref 7–18)
CALCIUM BLD-MCNC: 9.2 MG/DL (ref 8.5–10.1)
CHLORIDE SERPL-SCNC: 101 MMOL/L (ref 98–112)
CO2 SERPL-SCNC: 25 MMOL/L (ref 21–32)
CREAT BLD-MCNC: 0.7 MG/DL
EOSINOPHIL # BLD AUTO: 0.04 X10(3) UL (ref 0–0.7)
EOSINOPHIL NFR BLD AUTO: 0.3 %
ERYTHROCYTE [DISTWIDTH] IN BLOOD BY AUTOMATED COUNT: 13.2 %
GFR SERPLBLD BASED ON 1.73 SQ M-ARVRAT: 110 ML/MIN/1.73M2 (ref 60–?)
GLOBULIN PLAS-MCNC: 4.8 G/DL (ref 2.8–4.4)
GLUCOSE BLD-MCNC: 149 MG/DL (ref 70–99)
HCT VFR BLD AUTO: 36.8 %
HGB BLD-MCNC: 12 G/DL
IMM GRANULOCYTES # BLD AUTO: 0.14 X10(3) UL (ref 0–1)
IMM GRANULOCYTES NFR BLD: 1.2 %
LYMPHOCYTES # BLD AUTO: 0.57 X10(3) UL (ref 1–4)
LYMPHOCYTES NFR BLD AUTO: 4.8 %
MCH RBC QN AUTO: 32 PG (ref 26–34)
MCHC RBC AUTO-ENTMCNC: 32.6 G/DL (ref 31–37)
MCV RBC AUTO: 98.1 FL
MONOCYTES # BLD AUTO: 0.82 X10(3) UL (ref 0.1–1)
MONOCYTES NFR BLD AUTO: 6.9 %
NEUTROPHILS # BLD AUTO: 10.3 X10 (3) UL (ref 1.5–7.7)
NEUTROPHILS # BLD AUTO: 10.3 X10(3) UL (ref 1.5–7.7)
NEUTROPHILS NFR BLD AUTO: 86.6 %
OSMOLALITY SERPL CALC.SUM OF ELEC: 283 MOSM/KG (ref 275–295)
PLATELET # BLD AUTO: 431 10(3)UL (ref 150–450)
POTASSIUM SERPL-SCNC: 4 MMOL/L (ref 3.5–5.1)
PROT SERPL-MCNC: 7.7 G/DL (ref 6.4–8.2)
RBC # BLD AUTO: 3.75 X10(6)UL
RH BLOOD TYPE: POSITIVE
SODIUM SERPL-SCNC: 134 MMOL/L (ref 136–145)
TROPONIN I HIGH SENSITIVITY: 4 NG/L
WBC # BLD AUTO: 11.9 X10(3) UL (ref 4–11)

## 2022-10-15 PROCEDURE — 71275 CT ANGIOGRAPHY CHEST: CPT | Performed by: EMERGENCY MEDICINE

## 2022-10-15 PROCEDURE — 71045 X-RAY EXAM CHEST 1 VIEW: CPT | Performed by: EMERGENCY MEDICINE

## 2022-10-15 PROCEDURE — 74177 CT ABD & PELVIS W/CONTRAST: CPT | Performed by: EMERGENCY MEDICINE

## 2022-10-15 RX ORDER — IOHEXOL 350 MG/ML
100 INJECTION, SOLUTION INTRAVENOUS
Status: COMPLETED | OUTPATIENT
Start: 2022-10-15 | End: 2022-10-15

## 2022-10-15 RX ORDER — ONDANSETRON 2 MG/ML
4 INJECTION INTRAMUSCULAR; INTRAVENOUS ONCE
Status: COMPLETED | OUTPATIENT
Start: 2022-10-15 | End: 2022-10-15

## 2022-10-15 RX ORDER — HYDROMORPHONE HYDROCHLORIDE 1 MG/ML
1 INJECTION, SOLUTION INTRAMUSCULAR; INTRAVENOUS; SUBCUTANEOUS ONCE
Status: COMPLETED | OUTPATIENT
Start: 2022-10-15 | End: 2022-10-15

## 2022-10-15 RX ORDER — HYDROMORPHONE HYDROCHLORIDE 1 MG/ML
0.5 INJECTION, SOLUTION INTRAMUSCULAR; INTRAVENOUS; SUBCUTANEOUS EVERY 30 MIN PRN
Status: DISCONTINUED | OUTPATIENT
Start: 2022-10-15 | End: 2022-10-16

## 2022-10-16 ENCOUNTER — APPOINTMENT (OUTPATIENT)
Dept: GENERAL RADIOLOGY | Facility: HOSPITAL | Age: 52
End: 2022-10-16
Attending: STUDENT IN AN ORGANIZED HEALTH CARE EDUCATION/TRAINING PROGRAM
Payer: COMMERCIAL

## 2022-10-16 ENCOUNTER — APPOINTMENT (OUTPATIENT)
Dept: GENERAL RADIOLOGY | Facility: HOSPITAL | Age: 52
End: 2022-10-16
Attending: INTERNAL MEDICINE
Payer: COMMERCIAL

## 2022-10-16 ENCOUNTER — APPOINTMENT (OUTPATIENT)
Dept: GENERAL RADIOLOGY | Facility: HOSPITAL | Age: 52
End: 2022-10-16
Attending: RADIOLOGY
Payer: COMMERCIAL

## 2022-10-16 ENCOUNTER — APPOINTMENT (OUTPATIENT)
Dept: CT IMAGING | Facility: HOSPITAL | Age: 52
End: 2022-10-16
Attending: INTERNAL MEDICINE
Payer: COMMERCIAL

## 2022-10-16 PROBLEM — C15.9 MALIGNANT NEOPLASM OF ESOPHAGUS, UNSPECIFIED LOCATION (HCC): Status: ACTIVE | Noted: 2022-10-16

## 2022-10-16 PROBLEM — C15.9 MALIGNANT NEOPLASM OF ESOPHAGUS (HCC): Status: ACTIVE | Noted: 2022-06-16

## 2022-10-16 PROBLEM — J90 PLEURAL EFFUSION: Status: ACTIVE | Noted: 2022-10-16

## 2022-10-16 LAB
AMYLASE PLR-CCNC: 403 U/L
AMYLASE SERPL-CCNC: 31 U/L (ref 25–115)
ANION GAP SERPL CALC-SCNC: 11 MMOL/L (ref 0–18)
BASOPHILS # BLD AUTO: 0.02 X10(3) UL (ref 0–0.2)
BASOPHILS NFR BLD AUTO: 0.2 %
BUN BLD-MCNC: 21 MG/DL (ref 7–18)
CALCIUM BLD-MCNC: 9.4 MG/DL (ref 8.5–10.1)
CHLORIDE SERPL-SCNC: 100 MMOL/L (ref 98–112)
CHOLEST SERPL-MCNC: 108 MG/DL (ref ?–200)
CO2 SERPL-SCNC: 21 MMOL/L (ref 21–32)
CREAT BLD-MCNC: 1 MG/DL
D DIMER PPP FEU-MCNC: 3.26 UG/ML FEU (ref ?–0.53)
EOSINOPHIL # BLD AUTO: 0 X10(3) UL (ref 0–0.7)
EOSINOPHIL NFR BLD AUTO: 0 %
ERYTHROCYTE [DISTWIDTH] IN BLOOD BY AUTOMATED COUNT: 13.2 %
GFR SERPLBLD BASED ON 1.73 SQ M-ARVRAT: 90 ML/MIN/1.73M2 (ref 60–?)
GLUCOSE BLD-MCNC: 163 MG/DL (ref 70–99)
GLUCOSE BLD-MCNC: 177 MG/DL (ref 70–99)
GLUCOSE BLD-MCNC: 188 MG/DL (ref 70–99)
GLUCOSE BLD-MCNC: 221 MG/DL (ref 70–99)
GLUCOSE PLR-MCNC: <1 MG/DL
HCT VFR BLD AUTO: 39.5 %
HGB BLD-MCNC: 12.5 G/DL
IMM GRANULOCYTES # BLD AUTO: 0.07 X10(3) UL (ref 0–1)
IMM GRANULOCYTES NFR BLD: 0.5 %
LDH FLD L TO P-CCNC: 147 U/L
LIPASE SERPL-CCNC: 71 U/L (ref 73–393)
LYMPHOCYTES # BLD AUTO: 0.35 X10(3) UL (ref 1–4)
LYMPHOCYTES NFR BLD AUTO: 2.6 %
MCH RBC QN AUTO: 32.5 PG (ref 26–34)
MCHC RBC AUTO-ENTMCNC: 31.6 G/DL (ref 31–37)
MCV RBC AUTO: 102.6 FL
MONOCYTES # BLD AUTO: 1.28 X10(3) UL (ref 0.1–1)
MONOCYTES NFR BLD AUTO: 9.6 %
NEUTROPHILS # BLD AUTO: 11.59 X10 (3) UL (ref 1.5–7.7)
NEUTROPHILS # BLD AUTO: 11.59 X10(3) UL (ref 1.5–7.7)
NEUTROPHILS NFR BLD AUTO: 87.1 %
OSMOLALITY SERPL CALC.SUM OF ELEC: 284 MOSM/KG (ref 275–295)
PLATELET # BLD AUTO: 480 10(3)UL (ref 150–450)
POTASSIUM SERPL-SCNC: 5.6 MMOL/L (ref 3.5–5.1)
PROT PLR-MCNC: 2.4 G/DL
RBC # BLD AUTO: 3.85 X10(6)UL
RBC PLEURAL FLUID: NORMAL /MM3
SARS-COV-2 RNA RESP QL NAA+PROBE: DETECTED
SODIUM SERPL-SCNC: 132 MMOL/L (ref 136–145)
WBC # BLD AUTO: 13.3 X10(3) UL (ref 4–11)
WBC # PLR: NORMAL /MM3

## 2022-10-16 PROCEDURE — 99223 1ST HOSP IP/OBS HIGH 75: CPT | Performed by: INTERNAL MEDICINE

## 2022-10-16 PROCEDURE — 0W9930Z DRAINAGE OF RIGHT PLEURAL CAVITY WITH DRAINAGE DEVICE, PERCUTANEOUS APPROACH: ICD-10-PCS | Performed by: RADIOLOGY

## 2022-10-16 PROCEDURE — 74240 X-RAY XM UPR GI TRC 1CNTRST: CPT | Performed by: INTERNAL MEDICINE

## 2022-10-16 PROCEDURE — 71045 X-RAY EXAM CHEST 1 VIEW: CPT | Performed by: RADIOLOGY

## 2022-10-16 PROCEDURE — 71045 X-RAY EXAM CHEST 1 VIEW: CPT | Performed by: STUDENT IN AN ORGANIZED HEALTH CARE EDUCATION/TRAINING PROGRAM

## 2022-10-16 PROCEDURE — 99152 MOD SED SAME PHYS/QHP 5/>YRS: CPT | Performed by: INTERNAL MEDICINE

## 2022-10-16 PROCEDURE — 32557 INSERT CATH PLEURA W/ IMAGE: CPT | Performed by: INTERNAL MEDICINE

## 2022-10-16 RX ORDER — NALOXONE HYDROCHLORIDE 0.4 MG/ML
80 INJECTION, SOLUTION INTRAMUSCULAR; INTRAVENOUS; SUBCUTANEOUS AS NEEDED
Status: DISCONTINUED | OUTPATIENT
Start: 2022-10-16 | End: 2022-10-16 | Stop reason: HOSPADM

## 2022-10-16 RX ORDER — METOPROLOL SUCCINATE 50 MG/1
50 TABLET, EXTENDED RELEASE ORAL
Status: DISCONTINUED | OUTPATIENT
Start: 2022-10-16 | End: 2022-10-17

## 2022-10-16 RX ORDER — SODIUM CHLORIDE 9 MG/ML
INJECTION, SOLUTION INTRAVENOUS CONTINUOUS
Status: DISCONTINUED | OUTPATIENT
Start: 2022-10-16 | End: 2022-10-19

## 2022-10-16 RX ORDER — ONDANSETRON 2 MG/ML
4 INJECTION INTRAMUSCULAR; INTRAVENOUS EVERY 4 HOURS PRN
Status: DISCONTINUED | OUTPATIENT
Start: 2022-10-16 | End: 2022-10-16

## 2022-10-16 RX ORDER — HYDROMORPHONE HYDROCHLORIDE 1 MG/ML
0.2 INJECTION, SOLUTION INTRAMUSCULAR; INTRAVENOUS; SUBCUTANEOUS EVERY 2 HOUR PRN
Status: DISCONTINUED | OUTPATIENT
Start: 2022-10-16 | End: 2022-10-16

## 2022-10-16 RX ORDER — SODIUM CHLORIDE 9 MG/ML
INJECTION, SOLUTION INTRAVENOUS CONTINUOUS
Status: ACTIVE | OUTPATIENT
Start: 2022-10-16 | End: 2022-10-16

## 2022-10-16 RX ORDER — HYDROMORPHONE HYDROCHLORIDE 1 MG/ML
0.8 INJECTION, SOLUTION INTRAMUSCULAR; INTRAVENOUS; SUBCUTANEOUS EVERY 2 HOUR PRN
Status: DISCONTINUED | OUTPATIENT
Start: 2022-10-16 | End: 2022-10-16

## 2022-10-16 RX ORDER — ATORVASTATIN CALCIUM 20 MG/1
20 TABLET, FILM COATED ORAL NIGHTLY
Status: DISCONTINUED | OUTPATIENT
Start: 2022-10-16 | End: 2022-10-17

## 2022-10-16 RX ORDER — HYDROMORPHONE HYDROCHLORIDE 1 MG/ML
1.2 INJECTION, SOLUTION INTRAMUSCULAR; INTRAVENOUS; SUBCUTANEOUS ONCE
Status: COMPLETED | OUTPATIENT
Start: 2022-10-16 | End: 2022-10-16

## 2022-10-16 RX ORDER — ACETAMINOPHEN 500 MG
500 TABLET ORAL EVERY 4 HOURS PRN
Status: DISCONTINUED | OUTPATIENT
Start: 2022-10-16 | End: 2022-10-17

## 2022-10-16 RX ORDER — MORPHINE SULFATE 2 MG/ML
0.5 INJECTION, SOLUTION INTRAMUSCULAR; INTRAVENOUS EVERY 2 HOUR PRN
Status: DISCONTINUED | OUTPATIENT
Start: 2022-10-16 | End: 2022-10-16

## 2022-10-16 RX ORDER — PROCHLORPERAZINE EDISYLATE 5 MG/ML
5 INJECTION INTRAMUSCULAR; INTRAVENOUS EVERY 8 HOURS PRN
Status: DISCONTINUED | OUTPATIENT
Start: 2022-10-16 | End: 2022-10-20

## 2022-10-16 RX ORDER — METOPROLOL TARTRATE 5 MG/5ML
5 INJECTION INTRAVENOUS EVERY 6 HOURS PRN
Status: DISCONTINUED | OUTPATIENT
Start: 2022-10-16 | End: 2022-10-26

## 2022-10-16 RX ORDER — MORPHINE SULFATE 2 MG/ML
2 INJECTION, SOLUTION INTRAMUSCULAR; INTRAVENOUS EVERY 2 HOUR PRN
Status: DISCONTINUED | OUTPATIENT
Start: 2022-10-16 | End: 2022-10-16

## 2022-10-16 RX ORDER — PANTOPRAZOLE SODIUM 40 MG/1
40 TABLET, DELAYED RELEASE ORAL
Status: DISCONTINUED | OUTPATIENT
Start: 2022-10-16 | End: 2022-10-16

## 2022-10-16 RX ORDER — HYDROMORPHONE HYDROCHLORIDE 1 MG/ML
0.4 INJECTION, SOLUTION INTRAMUSCULAR; INTRAVENOUS; SUBCUTANEOUS EVERY 2 HOUR PRN
Status: DISCONTINUED | OUTPATIENT
Start: 2022-10-16 | End: 2022-10-26

## 2022-10-16 RX ORDER — HYDROMORPHONE HYDROCHLORIDE 1 MG/ML
1.2 INJECTION, SOLUTION INTRAMUSCULAR; INTRAVENOUS; SUBCUTANEOUS EVERY 2 HOUR PRN
Status: DISCONTINUED | OUTPATIENT
Start: 2022-10-16 | End: 2022-10-21

## 2022-10-16 RX ORDER — BENZONATATE 100 MG/1
100 CAPSULE ORAL 3 TIMES DAILY PRN
Status: DISCONTINUED | OUTPATIENT
Start: 2022-10-16 | End: 2022-10-19

## 2022-10-16 RX ORDER — MIDAZOLAM HYDROCHLORIDE 1 MG/ML
1 INJECTION INTRAMUSCULAR; INTRAVENOUS EVERY 5 MIN PRN
Status: DISCONTINUED | OUTPATIENT
Start: 2022-10-16 | End: 2022-10-16 | Stop reason: HOSPADM

## 2022-10-16 RX ORDER — ENOXAPARIN SODIUM 100 MG/ML
40 INJECTION SUBCUTANEOUS DAILY
Status: DISCONTINUED | OUTPATIENT
Start: 2022-10-16 | End: 2022-10-26

## 2022-10-16 RX ORDER — ONDANSETRON 2 MG/ML
4 INJECTION INTRAMUSCULAR; INTRAVENOUS EVERY 6 HOURS PRN
Status: DISCONTINUED | OUTPATIENT
Start: 2022-10-16 | End: 2022-10-26

## 2022-10-16 RX ORDER — MORPHINE SULFATE 2 MG/ML
1 INJECTION, SOLUTION INTRAMUSCULAR; INTRAVENOUS EVERY 2 HOUR PRN
Status: DISCONTINUED | OUTPATIENT
Start: 2022-10-16 | End: 2022-10-16

## 2022-10-16 RX ORDER — FLUMAZENIL 0.1 MG/ML
0.2 INJECTION, SOLUTION INTRAVENOUS AS NEEDED
Status: DISCONTINUED | OUTPATIENT
Start: 2022-10-16 | End: 2022-10-16 | Stop reason: HOSPADM

## 2022-10-16 RX ORDER — HYDROMORPHONE HYDROCHLORIDE 1 MG/ML
0.4 INJECTION, SOLUTION INTRAMUSCULAR; INTRAVENOUS; SUBCUTANEOUS EVERY 2 HOUR PRN
Status: DISCONTINUED | OUTPATIENT
Start: 2022-10-16 | End: 2022-10-16

## 2022-10-16 RX ORDER — HYDROMORPHONE HYDROCHLORIDE 1 MG/ML
0.8 INJECTION, SOLUTION INTRAMUSCULAR; INTRAVENOUS; SUBCUTANEOUS EVERY 2 HOUR PRN
Status: DISCONTINUED | OUTPATIENT
Start: 2022-10-16 | End: 2022-10-21

## 2022-10-16 RX ORDER — HYDROMORPHONE HYDROCHLORIDE 1 MG/ML
0.5 INJECTION, SOLUTION INTRAMUSCULAR; INTRAVENOUS; SUBCUTANEOUS EVERY 30 MIN PRN
Status: DISPENSED | OUTPATIENT
Start: 2022-10-16 | End: 2022-10-16

## 2022-10-16 RX ORDER — OXYCODONE HYDROCHLORIDE 5 MG/1
5 TABLET ORAL EVERY 6 HOURS PRN
Status: DISCONTINUED | OUTPATIENT
Start: 2022-10-16 | End: 2022-10-17

## 2022-10-16 RX ORDER — VANCOMYCIN/0.9 % SOD CHLORIDE 1.75 G/5
15 PLASTIC BAG, INJECTION (ML) INTRAVENOUS EVERY 12 HOURS
Status: DISCONTINUED | OUTPATIENT
Start: 2022-10-17 | End: 2022-10-17

## 2022-10-16 RX ORDER — FOLIC ACID 1 MG/1
1 TABLET ORAL DAILY
Status: DISCONTINUED | OUTPATIENT
Start: 2022-10-16 | End: 2022-10-17

## 2022-10-16 RX ORDER — MIDAZOLAM HYDROCHLORIDE 1 MG/ML
INJECTION INTRAMUSCULAR; INTRAVENOUS
Status: COMPLETED
Start: 2022-10-16 | End: 2022-10-16

## 2022-10-16 RX ORDER — VANCOMYCIN 2 GRAM/500 ML IN 0.9 % SODIUM CHLORIDE INTRAVENOUS
25 ONCE
Status: COMPLETED | OUTPATIENT
Start: 2022-10-16 | End: 2022-10-16

## 2022-10-16 RX ORDER — SODIUM CHLORIDE 9 MG/ML
INJECTION, SOLUTION INTRAVENOUS CONTINUOUS
Status: DISCONTINUED | OUTPATIENT
Start: 2022-10-16 | End: 2022-10-16 | Stop reason: HOSPADM

## 2022-10-16 NOTE — PROGRESS NOTES
Patient is doing well after chest tube placement. Pleural fluid studies reviewed and are concerning for possible esophageal perforation (amylase >400). Discussed with Dr. Luca Martin as well as Valeriy Rodriguez RN and Gerta Badillo RN.

## 2022-10-16 NOTE — ED INITIAL ASSESSMENT (HPI)
PT HAD RECENT PROCEDURE FOR ESOPHAGEAL CA. PT STATES HE CAN NOT BREATHE AND HAS PAIN IN ENTIRE RIGHT CHEST RADIATING DOWN TO HIS ABDOMEN AND LOW BACK.; PT PALE, STATES + BLACK STOOLS.

## 2022-10-16 NOTE — PROCEDURES
[facility]  Procedure Note    Liz Nino.  Patient Status:  Inpatient    10/15/1970 MRN [id number]   [facility] 7NE-A Attending Orestes Biggs MD   Hosp Day # 0 PCP Nando Grant MD     Procedure: CT guided right chest tube placement    Pre-Procedure Diagnosis:  Right hydropneumothorax    Post-Procedure Diagnosis: same    Anesthesia:  Local and Sedation    Findings:  Cloudy fluid    Specimens: 920 ml    Blood Loss:  <10 ml    Tourniquet Time: n/a  Complications:  None  Drains:  10.2 Fr right chest tube with locking string cut and removed    Secondary Diagnosis:  none    Jazmine Landaverde MD  10/16/2022

## 2022-10-16 NOTE — PLAN OF CARE
Assumed care at 0700. A & O x 4. NSR/ ST on tele. 4 L O2 per nasal cannula. BP elevated this AM.  Pt reported dyspnea at rest, shallow breathing and diaphoretic. MD notified. Pt sent to CT for guided right chest tube placement. Chest tube to continuous suction - 20 cm H2O. Draining milky/tan/opaque output. Tube feedings held per MD order. Strict NPO. J tube C/D/I. Patient rated pain 7-10 to throat/ chest throughout the day PRN dilaudid and Morphine given with some relief. IVFs and abx given per order. XR UGI/ Esophagus to be completed. Voiding per urinal.   Patient/ family updated on plan of care.

## 2022-10-16 NOTE — ED QUICK NOTES
Orders for admission, patient is aware of plan and ready to go upstairs. Any questions, please call ED RN Will at extension [phone].      Patient Covid vaccination status: Fully vaccinated and immunocompromised     COVID Test Ordered in ED: Rapid SARS-CoV-2 by PCR    COVID Suspicion at Admission: +    Running Infusions:  None    Mental Status/LOC at time of transport: aox4    Other pertinent information:   CIWA score: N/A   NIH score:  N/A

## 2022-10-16 NOTE — PROGRESS NOTES
Patient returned from [address] [place] [address]  Patient alert and oriented X4  On 6L NC O2 90%  Q15 vital signs, stable   ST on tele   C/o pain 7/10 to throat/chest, given PRN dilaudid   Resting comfortably   Call light within reach

## 2022-10-16 NOTE — PLAN OF CARE
Assumed care of pt around 3am.   A/o x4. 2-3L NC.   's until around 5am up to 120's. Hosp notified. Severe chest pain, given Dilaudid w/o relief. Dyspnea at rest, labored/shallow breathing. C/o nausea given PRN Zofran. Attempted to contact Cardio-thoracic for consult w/ no call back. Plan for Hosp, pulm, cardio-thoracic surg to see pt.

## 2022-10-16 NOTE — IMAGING NOTE
NPO status verified  Pertinent labs and radiology imaging reviewed  Consent signed and on file    Patient tolerated procedural sedation without any immediate complications noted. Image guided thoracentesis with 10. Fr pleural chest tube  site to right lateral chest with sorba-view  dressing. C/D/I. Patient denies pain. Report given to [facility]. Patient transported to [phone] accompanied by RN and tranport.

## 2022-10-17 ENCOUNTER — APPOINTMENT (OUTPATIENT)
Dept: GENERAL RADIOLOGY | Facility: HOSPITAL | Age: 52
End: 2022-10-17
Attending: INTERNAL MEDICINE
Payer: COMMERCIAL

## 2022-10-17 ENCOUNTER — APPOINTMENT (OUTPATIENT)
Dept: HEMATOLOGY/ONCOLOGY | Age: 52
End: 2022-10-17
Attending: INTERNAL MEDICINE
Payer: COMMERCIAL

## 2022-10-17 ENCOUNTER — ANESTHESIA EVENT (OUTPATIENT)
Dept: ENDOSCOPY | Facility: HOSPITAL | Age: 52
End: 2022-10-17
Payer: COMMERCIAL

## 2022-10-17 PROBLEM — K91.89 ESOPHAGEAL ANASTOMOTIC LEAK: Status: ACTIVE | Noted: 2022-10-17

## 2022-10-17 LAB
ANION GAP SERPL CALC-SCNC: 7 MMOL/L (ref 0–18)
ATRIAL RATE: 105 BPM
BASOPHILS # BLD AUTO: 0.03 X10(3) UL (ref 0–0.2)
BASOPHILS NFR BLD AUTO: 0.3 %
BASOPHILS NFR PLR: 0 %
BUN BLD-MCNC: 26 MG/DL (ref 7–18)
CALCIUM BLD-MCNC: 9.4 MG/DL (ref 8.5–10.1)
CHLORIDE SERPL-SCNC: 105 MMOL/L (ref 98–112)
CO2 SERPL-SCNC: 26 MMOL/L (ref 21–32)
CREAT BLD-MCNC: 0.78 MG/DL
CREAT BLD-MCNC: 0.78 MG/DL
EOSINOPHIL # BLD AUTO: 0.01 X10(3) UL (ref 0–0.7)
EOSINOPHIL NFR BLD AUTO: 0.1 %
EOSINOPHIL NFR PLR: 0 %
ERYTHROCYTE [DISTWIDTH] IN BLOOD BY AUTOMATED COUNT: 13 %
GFR SERPLBLD BASED ON 1.73 SQ M-ARVRAT: 107 ML/MIN/1.73M2 (ref 60–?)
GFR SERPLBLD BASED ON 1.73 SQ M-ARVRAT: 107 ML/MIN/1.73M2 (ref 60–?)
GLUCOSE BLD-MCNC: 122 MG/DL (ref 70–99)
GLUCOSE BLD-MCNC: 150 MG/DL (ref 70–99)
GLUCOSE BLD-MCNC: 154 MG/DL (ref 70–99)
GLUCOSE BLD-MCNC: 166 MG/DL (ref 70–99)
HCT VFR BLD AUTO: 37.1 %
HGB BLD-MCNC: 11.6 G/DL
IMM GRANULOCYTES # BLD AUTO: 0.12 X10(3) UL (ref 0–1)
IMM GRANULOCYTES NFR BLD: 1.1 %
LYMPHOCYTES # BLD AUTO: 0.4 X10(3) UL (ref 1–4)
LYMPHOCYTES NFR BLD AUTO: 3.5 %
LYMPHOCYTES NFR PLR: 1 %
MCH RBC QN AUTO: 32.2 PG (ref 26–34)
MCHC RBC AUTO-ENTMCNC: 31.3 G/DL (ref 31–37)
MCV RBC AUTO: 103.1 FL
MONOCYTES # BLD AUTO: 1.28 X10(3) UL (ref 0.1–1)
MONOCYTES NFR BLD AUTO: 11.2 %
MONOS+MACROS NFR PLR: 1 %
NEUTROPHILS # BLD AUTO: 9.56 X10 (3) UL (ref 1.5–7.7)
NEUTROPHILS # BLD AUTO: 9.56 X10(3) UL (ref 1.5–7.7)
NEUTROPHILS NFR BLD AUTO: 83.8 %
NEUTROPHILS NFR PLR: 98 %
OSMOLALITY SERPL CALC.SUM OF ELEC: 294 MOSM/KG (ref 275–295)
P AXIS: -9 DEGREES
P-R INTERVAL: 128 MS
PLATELET # BLD AUTO: 424 10(3)UL (ref 150–450)
POTASSIUM SERPL-SCNC: 5.2 MMOL/L (ref 3.5–5.1)
Q-T INTERVAL: 316 MS
QRS DURATION: 80 MS
QTC CALCULATION (BEZET): 417 MS
R AXIS: -11 DEGREES
RBC # BLD AUTO: 3.6 X10(6)UL
SODIUM SERPL-SCNC: 138 MMOL/L (ref 136–145)
T AXIS: -11 DEGREES
TOTAL CELLS COUNTED FLD: 100
VENTRICULAR RATE: 105 BPM
WBC # BLD AUTO: 11.4 X10(3) UL (ref 4–11)

## 2022-10-17 PROCEDURE — 99232 SBSQ HOSP IP/OBS MODERATE 35: CPT | Performed by: HOSPITALIST

## 2022-10-17 PROCEDURE — 71045 X-RAY EXAM CHEST 1 VIEW: CPT | Performed by: INTERNAL MEDICINE

## 2022-10-17 RX ORDER — SODIUM BICARBONATE 325 MG/1
325 TABLET ORAL AS NEEDED
Status: DISCONTINUED | OUTPATIENT
Start: 2022-10-17 | End: 2022-10-19

## 2022-10-17 RX ORDER — ATORVASTATIN CALCIUM 20 MG/1
20 TABLET, FILM COATED ORAL NIGHTLY
Status: DISCONTINUED | OUTPATIENT
Start: 2022-10-18 | End: 2022-10-17

## 2022-10-17 RX ORDER — ACETAMINOPHEN 160 MG/5ML
1000 SOLUTION ORAL EVERY 6 HOURS
Status: DISCONTINUED | OUTPATIENT
Start: 2022-10-17 | End: 2022-10-17

## 2022-10-17 RX ORDER — ACETAMINOPHEN 10 MG/ML
1000 INJECTION, SOLUTION INTRAVENOUS EVERY 6 HOURS
Status: DISCONTINUED | OUTPATIENT
Start: 2022-10-17 | End: 2022-10-25

## 2022-10-17 RX ORDER — CETIRIZINE HYDROCHLORIDE 1 MG/ML
10 SOLUTION ORAL DAILY
Status: DISCONTINUED | OUTPATIENT
Start: 2022-10-18 | End: 2022-10-26

## 2022-10-17 RX ORDER — CETIRIZINE HYDROCHLORIDE 10 MG/1
10 TABLET ORAL DAILY
Status: DISCONTINUED | OUTPATIENT
Start: 2022-10-17 | End: 2022-10-17 | Stop reason: SDUPTHER

## 2022-10-17 RX ORDER — FLUTICASONE PROPIONATE 50 MCG
1 SPRAY, SUSPENSION (ML) NASAL DAILY
Status: DISCONTINUED | OUTPATIENT
Start: 2022-10-17 | End: 2022-10-26

## 2022-10-17 RX ORDER — ATORVASTATIN CALCIUM 20 MG/1
20 TABLET, FILM COATED ORAL NIGHTLY
Status: DISCONTINUED | OUTPATIENT
Start: 2022-10-18 | End: 2022-10-26

## 2022-10-17 RX ORDER — FOLIC ACID 1 MG/1
1 TABLET ORAL DAILY
Status: DISCONTINUED | OUTPATIENT
Start: 2022-10-18 | End: 2022-10-17

## 2022-10-17 RX ORDER — FOLIC ACID 1 MG/1
1 TABLET ORAL DAILY
Status: DISCONTINUED | OUTPATIENT
Start: 2022-10-18 | End: 2022-10-25

## 2022-10-17 RX ORDER — CETIRIZINE HYDROCHLORIDE 1 MG/ML
10 SOLUTION ORAL DAILY
Status: DISCONTINUED | OUTPATIENT
Start: 2022-10-17 | End: 2022-10-17

## 2022-10-17 RX ORDER — OXYCODONE HCL 5 MG/5 ML
5 SOLUTION, ORAL ORAL EVERY 4 HOURS PRN
Status: DISCONTINUED | OUTPATIENT
Start: 2022-10-17 | End: 2022-10-19

## 2022-10-17 NOTE — PLAN OF CARE
Assumed care at [address] [place] [address].    A&Ox4, 6L NC, NSR;ST on tele  CT to continuous wall suction; brown and tan output   TF on hold   NPO  Patient c/o 8/10 pain prn dilaudid given Q2  XR UGI completed, GI notified   Voiding per urinal   Call light within reach    Patient updated on plan of care

## 2022-10-17 NOTE — PLAN OF CARE
Assumed patient care 0730  Patient alert and oriented X4  On 6L NC, BP elevated, given scheduled BP medication, NSR/ST on tele  Patient c/o pain 5-8/10 pain to chest/throat, given PRN dilaudid with some relief  Requiring pain medication less frequently, started IV tylenol  Strict NPO maintained  Up with 1 assist to chair, voiding per urinal, no BM this shift  Belching(+), Flatus(-)  R CT to -20 continuous wall suction, draining brown serous fluid, 150ml out today, marked at 650ml  J tube flushed Q4 with 30mL NS, leaking scant amount green/bile purulent fluid at site, cleansed, dressing changed X2  IVF and IV ABX infusing per order  Left chest port flushing, does not draw  Plan for EGD with possible stenting, possible suturing with Dr Estuardo Gallagher on 10/18/22, hold lovenox, consent in patient chart  Patient updated on plan of care    Problem: RESPIRATORY - ADULT  Goal: Achieves optimal ventilation and oxygenation  Description: INTERVENTIONS:  - Assess for changes in respiratory status  - Assess for changes in mentation and behavior  - Position to facilitate oxygenation and minimize respiratory effort  - Oxygen supplementation based on oxygen saturation or ABGs  - Provide Smoking Cessation handout, if applicable  - Encourage broncho-pulmonary hygiene including cough, deep breathe, Incentive Spirometry  - Assess the need for suctioning and perform as needed  - Assess and instruct to report SOB or any respiratory difficulty  - Respiratory Therapy support as indicated  - Manage/alleviate anxiety  - Monitor for signs/symptoms of CO2 retention  Outcome: Progressing     Problem: PAIN - ADULT  Goal: Verbalizes/displays adequate comfort level or patient's stated pain goal  Description: INTERVENTIONS:  - Encourage pt to monitor pain and request assistance  - Assess pain using appropriate pain scale  - Administer analgesics based on type and severity of pain and evaluate response  - Implement non-pharmacological measures as appropriate and evaluate response  - Consider cultural and social influences on pain and pain management  - Manage/alleviate anxiety  - Utilize distraction and/or relaxation techniques  - Monitor for opioid side effects  - Notify MD/LIP if interventions unsuccessful or patient reports new pain  - Anticipate increased pain with activity and pre-medicate as appropriate  Outcome: Progressing     Problem: CARDIOVASCULAR - ADULT  Goal: Maintains optimal cardiac output and hemodynamic stability  Description: INTERVENTIONS:  - Monitor vital signs, rhythm, and trends  - Monitor for bleeding, hypotension and signs of decreased cardiac output  - Evaluate effectiveness of vasoactive medications to optimize hemodynamic stability  - Monitor arterial and/or venous puncture sites for bleeding and/or hematoma  - Assess quality of pulses, skin color and temperature  - Assess for signs of decreased coronary artery perfusion - ex.  Angina  - Evaluate fluid balance, assess for edema, trend weights  Outcome: Progressing  Goal: Absence of cardiac arrhythmias or at baseline  Description: INTERVENTIONS:  - Continuous cardiac monitoring, monitor vital signs, obtain 12 lead EKG if indicated  - Evaluate effectiveness of antiarrhythmic and heart rate control medications as ordered  - Initiate emergency measures for life threatening arrhythmias  - Monitor electrolytes and administer replacement therapy as ordered  Outcome: Progressing

## 2022-10-17 NOTE — PROGRESS NOTES
[facility] Dosing Service    Initial Pharmacokinetic Consult for Vancomycin AUC Dosing    Olivier Gamez. is a 47year old patient who is being treated for  infected pleural fluid  . Pharmacy has been asked to dose vancomycin by Kimmie Rosenthal MD.     Weights:  Ideal body weight: 82.2 kg (181 lb 3.5 oz)  Adjusted ideal body weight: 94.7 kg (208 lb 11.7 oz)  Actual weight:  113.4 kg (250 lb)    Labs:  Lab Results   Component Value Date    CREATSERUM 1.00 10/16/2022    CREATSERUM 0.70 10/15/2022      CrCl:  Estimated Creatinine Clearance: 99.3 mL/min (based on SCr of 1 mg/dL). Based on the above:    1. This patient will receive a loading dose of Vancomycin  2000 mg IVPB (25mg/kg, capped at 2000 mg) x 1 dose. This will be followed by 1750 mg Q 12 hours based upon actual body weight of 113.4 kg and renal function. 2. Vancomycin peak and trough will be obtained at steady state in order to calculate AUC24. Goal AUC24 is 400-600 mg-h/L.    3. Pharmacy will order SCr as clinically indicated while on vancomycin to assess renal function. 4. Pharmacy will follow and monitor renal function, toxicity and efficacy. We appreciate the opportunity to assist in the care of this patient.     Norma Recinos, ConstanzaD  10/16/2022  7:09 PM  [address] [place] [address] Extension: [phone]

## 2022-10-18 ENCOUNTER — APPOINTMENT (OUTPATIENT)
Dept: GENERAL RADIOLOGY | Facility: HOSPITAL | Age: 52
End: 2022-10-18
Attending: INTERNAL MEDICINE
Payer: COMMERCIAL

## 2022-10-18 ENCOUNTER — ANESTHESIA (OUTPATIENT)
Dept: ENDOSCOPY | Facility: HOSPITAL | Age: 52
End: 2022-10-18
Payer: COMMERCIAL

## 2022-10-18 LAB
ALBUMIN SERPL-MCNC: 1.8 G/DL (ref 3.4–5)
ALBUMIN/GLOB SERPL: 0.4 {RATIO} (ref 1–2)
ALP LIVER SERPL-CCNC: 74 U/L
ALT SERPL-CCNC: 32 U/L
ANION GAP SERPL CALC-SCNC: 6 MMOL/L (ref 0–18)
AST SERPL-CCNC: 16 U/L (ref 15–37)
BASOPHILS # BLD AUTO: 0.02 X10(3) UL (ref 0–0.2)
BASOPHILS NFR BLD AUTO: 0.2 %
BILIRUB SERPL-MCNC: 0.5 MG/DL (ref 0.1–2)
BUN BLD-MCNC: 22 MG/DL (ref 7–18)
CALCIUM BLD-MCNC: 9.4 MG/DL (ref 8.5–10.1)
CHLORIDE SERPL-SCNC: 103 MMOL/L (ref 98–112)
CO2 SERPL-SCNC: 31 MMOL/L (ref 21–32)
CREAT BLD-MCNC: 0.69 MG/DL
CREAT BLD-MCNC: 0.69 MG/DL
EOSINOPHIL # BLD AUTO: 0.01 X10(3) UL (ref 0–0.7)
EOSINOPHIL NFR BLD AUTO: 0.1 %
ERYTHROCYTE [DISTWIDTH] IN BLOOD BY AUTOMATED COUNT: 13.3 %
GFR SERPLBLD BASED ON 1.73 SQ M-ARVRAT: 111 ML/MIN/1.73M2 (ref 60–?)
GFR SERPLBLD BASED ON 1.73 SQ M-ARVRAT: 111 ML/MIN/1.73M2 (ref 60–?)
GLOBULIN PLAS-MCNC: 4.4 G/DL (ref 2.8–4.4)
GLUCOSE BLD-MCNC: 111 MG/DL (ref 70–99)
GLUCOSE BLD-MCNC: 118 MG/DL (ref 70–99)
GLUCOSE BLD-MCNC: 129 MG/DL (ref 70–99)
GLUCOSE BLD-MCNC: 89 MG/DL (ref 70–99)
GLUCOSE BLD-MCNC: 91 MG/DL (ref 70–99)
GLUCOSE BLD-MCNC: 96 MG/DL (ref 70–99)
HCT VFR BLD AUTO: 32.3 %
HGB BLD-MCNC: 10 G/DL
IMM GRANULOCYTES # BLD AUTO: 0.16 X10(3) UL (ref 0–1)
IMM GRANULOCYTES NFR BLD: 1.6 %
LYMPHOCYTES # BLD AUTO: 0.4 X10(3) UL (ref 1–4)
LYMPHOCYTES NFR BLD AUTO: 4.1 %
MAGNESIUM SERPL-MCNC: 2.3 MG/DL (ref 1.6–2.6)
MCH RBC QN AUTO: 31.4 PG (ref 26–34)
MCHC RBC AUTO-ENTMCNC: 31 G/DL (ref 31–37)
MCV RBC AUTO: 101.6 FL
MONOCYTES # BLD AUTO: 0.92 X10(3) UL (ref 0.1–1)
MONOCYTES NFR BLD AUTO: 9.4 %
NEUTROPHILS # BLD AUTO: 8.31 X10 (3) UL (ref 1.5–7.7)
NEUTROPHILS # BLD AUTO: 8.31 X10(3) UL (ref 1.5–7.7)
NEUTROPHILS NFR BLD AUTO: 84.6 %
NON GYNE INTERPRETATION: NEGATIVE
OSMOLALITY SERPL CALC.SUM OF ELEC: 294 MOSM/KG (ref 275–295)
PLATELET # BLD AUTO: 368 10(3)UL (ref 150–450)
PLATELET MORPHOLOGY: NORMAL
POTASSIUM SERPL-SCNC: 4 MMOL/L (ref 3.5–5.1)
PROT SERPL-MCNC: 6.2 G/DL (ref 6.4–8.2)
RBC # BLD AUTO: 3.18 X10(6)UL
SODIUM SERPL-SCNC: 140 MMOL/L (ref 136–145)
WBC # BLD AUTO: 9.8 X10(3) UL (ref 4–11)

## 2022-10-18 PROCEDURE — 0DQ58ZZ REPAIR ESOPHAGUS, VIA NATURAL OR ARTIFICIAL OPENING ENDOSCOPIC: ICD-10-PCS | Performed by: INTERNAL MEDICINE

## 2022-10-18 PROCEDURE — 0D758DZ DILATION OF ESOPHAGUS WITH INTRALUMINAL DEVICE, VIA NATURAL OR ARTIFICIAL OPENING ENDOSCOPIC: ICD-10-PCS | Performed by: INTERNAL MEDICINE

## 2022-10-18 PROCEDURE — 74360 X-RAY GUIDE GI DILATION: CPT | Performed by: INTERNAL MEDICINE

## 2022-10-18 DEVICE — STENT SYSTEM
Type: IMPLANTABLE DEVICE | Site: ESOPHAGUS | Status: FUNCTIONAL
Brand: WALLFLEX™ ESOPHAGEAL

## 2022-10-18 RX ORDER — NICOTINE POLACRILEX 4 MG
15 LOZENGE BUCCAL
Status: DISCONTINUED | OUTPATIENT
Start: 2022-10-18 | End: 2022-10-18 | Stop reason: HOSPADM

## 2022-10-18 RX ORDER — LABETALOL HYDROCHLORIDE 5 MG/ML
INJECTION, SOLUTION INTRAVENOUS
Status: COMPLETED
Start: 2022-10-18 | End: 2022-10-18

## 2022-10-18 RX ORDER — ONDANSETRON 2 MG/ML
INJECTION INTRAMUSCULAR; INTRAVENOUS
Status: COMPLETED
Start: 2022-10-18 | End: 2022-10-18

## 2022-10-18 RX ORDER — SODIUM BICARBONATE 325 MG/1
325 TABLET ORAL AS NEEDED
Status: DISCONTINUED | OUTPATIENT
Start: 2022-10-18 | End: 2022-10-26

## 2022-10-18 RX ORDER — HYDROCODONE BITARTRATE AND ACETAMINOPHEN 10; 325 MG/1; MG/1
1 TABLET ORAL ONCE AS NEEDED
Status: DISCONTINUED | OUTPATIENT
Start: 2022-10-18 | End: 2022-10-18 | Stop reason: HOSPADM

## 2022-10-18 RX ORDER — DILTIAZEM HYDROCHLORIDE 5 MG/ML
10 INJECTION INTRAVENOUS ONCE
Status: COMPLETED | OUTPATIENT
Start: 2022-10-18 | End: 2022-10-18

## 2022-10-18 RX ORDER — NALOXONE HYDROCHLORIDE 0.4 MG/ML
80 INJECTION, SOLUTION INTRAMUSCULAR; INTRAVENOUS; SUBCUTANEOUS AS NEEDED
Status: DISCONTINUED | OUTPATIENT
Start: 2022-10-18 | End: 2022-10-18 | Stop reason: HOSPADM

## 2022-10-18 RX ORDER — ONDANSETRON 2 MG/ML
4 INJECTION INTRAMUSCULAR; INTRAVENOUS EVERY 6 HOURS PRN
Status: DISCONTINUED | OUTPATIENT
Start: 2022-10-18 | End: 2022-10-18 | Stop reason: HOSPADM

## 2022-10-18 RX ORDER — LIDOCAINE HYDROCHLORIDE 10 MG/ML
INJECTION, SOLUTION EPIDURAL; INFILTRATION; INTRACAUDAL; PERINEURAL AS NEEDED
Status: DISCONTINUED | OUTPATIENT
Start: 2022-10-18 | End: 2022-10-18 | Stop reason: SURG

## 2022-10-18 RX ORDER — ROCURONIUM BROMIDE 10 MG/ML
INJECTION, SOLUTION INTRAVENOUS AS NEEDED
Status: DISCONTINUED | OUTPATIENT
Start: 2022-10-18 | End: 2022-10-18 | Stop reason: SURG

## 2022-10-18 RX ORDER — HYDROCODONE BITARTRATE AND ACETAMINOPHEN 10; 325 MG/1; MG/1
2 TABLET ORAL ONCE AS NEEDED
Status: DISCONTINUED | OUTPATIENT
Start: 2022-10-18 | End: 2022-10-18 | Stop reason: HOSPADM

## 2022-10-18 RX ORDER — HYDRALAZINE HYDROCHLORIDE 20 MG/ML
10 INJECTION INTRAMUSCULAR; INTRAVENOUS EVERY 10 MIN PRN
Status: DISCONTINUED | OUTPATIENT
Start: 2022-10-18 | End: 2022-10-18 | Stop reason: HOSPADM

## 2022-10-18 RX ORDER — MEPERIDINE HYDROCHLORIDE 25 MG/ML
12.5 INJECTION INTRAMUSCULAR; INTRAVENOUS; SUBCUTANEOUS AS NEEDED
Status: DISCONTINUED | OUTPATIENT
Start: 2022-10-18 | End: 2022-10-18 | Stop reason: HOSPADM

## 2022-10-18 RX ORDER — HYDROMORPHONE HYDROCHLORIDE 1 MG/ML
INJECTION, SOLUTION INTRAMUSCULAR; INTRAVENOUS; SUBCUTANEOUS
Status: COMPLETED
Start: 2022-10-18 | End: 2022-10-18

## 2022-10-18 RX ORDER — PROCHLORPERAZINE EDISYLATE 5 MG/ML
5 INJECTION INTRAMUSCULAR; INTRAVENOUS EVERY 8 HOURS PRN
Status: DISCONTINUED | OUTPATIENT
Start: 2022-10-18 | End: 2022-10-18 | Stop reason: HOSPADM

## 2022-10-18 RX ORDER — ACETAMINOPHEN 500 MG
1000 TABLET ORAL ONCE AS NEEDED
Status: DISCONTINUED | OUTPATIENT
Start: 2022-10-18 | End: 2022-10-18 | Stop reason: HOSPADM

## 2022-10-18 RX ORDER — PROCHLORPERAZINE EDISYLATE 5 MG/ML
INJECTION INTRAMUSCULAR; INTRAVENOUS
Status: COMPLETED
Start: 2022-10-18 | End: 2022-10-18

## 2022-10-18 RX ORDER — HYDRALAZINE HYDROCHLORIDE 20 MG/ML
INJECTION INTRAMUSCULAR; INTRAVENOUS
Status: COMPLETED
Start: 2022-10-18 | End: 2022-10-18

## 2022-10-18 RX ORDER — SODIUM CHLORIDE, SODIUM LACTATE, POTASSIUM CHLORIDE, CALCIUM CHLORIDE 600; 310; 30; 20 MG/100ML; MG/100ML; MG/100ML; MG/100ML
INJECTION, SOLUTION INTRAVENOUS CONTINUOUS
Status: DISCONTINUED | OUTPATIENT
Start: 2022-10-18 | End: 2022-10-18 | Stop reason: HOSPADM

## 2022-10-18 RX ORDER — HYDROMORPHONE HYDROCHLORIDE 1 MG/ML
0.6 INJECTION, SOLUTION INTRAMUSCULAR; INTRAVENOUS; SUBCUTANEOUS EVERY 5 MIN PRN
Status: DISCONTINUED | OUTPATIENT
Start: 2022-10-18 | End: 2022-10-18 | Stop reason: HOSPADM

## 2022-10-18 RX ORDER — DEXTROSE MONOHYDRATE 25 G/50ML
50 INJECTION, SOLUTION INTRAVENOUS
Status: DISCONTINUED | OUTPATIENT
Start: 2022-10-18 | End: 2022-10-18 | Stop reason: HOSPADM

## 2022-10-18 RX ORDER — NICOTINE POLACRILEX 4 MG
30 LOZENGE BUCCAL
Status: DISCONTINUED | OUTPATIENT
Start: 2022-10-18 | End: 2022-10-18 | Stop reason: HOSPADM

## 2022-10-18 RX ORDER — MIDAZOLAM HYDROCHLORIDE 1 MG/ML
1 INJECTION INTRAMUSCULAR; INTRAVENOUS EVERY 5 MIN PRN
Status: DISCONTINUED | OUTPATIENT
Start: 2022-10-18 | End: 2022-10-18 | Stop reason: HOSPADM

## 2022-10-18 RX ORDER — HYDROMORPHONE HYDROCHLORIDE 1 MG/ML
0.4 INJECTION, SOLUTION INTRAMUSCULAR; INTRAVENOUS; SUBCUTANEOUS EVERY 5 MIN PRN
Status: DISCONTINUED | OUTPATIENT
Start: 2022-10-18 | End: 2022-10-18 | Stop reason: HOSPADM

## 2022-10-18 RX ORDER — HYDROMORPHONE HYDROCHLORIDE 1 MG/ML
0.2 INJECTION, SOLUTION INTRAMUSCULAR; INTRAVENOUS; SUBCUTANEOUS EVERY 5 MIN PRN
Status: DISCONTINUED | OUTPATIENT
Start: 2022-10-18 | End: 2022-10-18 | Stop reason: HOSPADM

## 2022-10-18 RX ADMIN — LIDOCAINE HYDROCHLORIDE 25 MG: 10 INJECTION, SOLUTION EPIDURAL; INFILTRATION; INTRACAUDAL; PERINEURAL at 11:32:00

## 2022-10-18 RX ADMIN — ROCURONIUM BROMIDE 20 MG: 10 INJECTION, SOLUTION INTRAVENOUS at 11:59:00

## 2022-10-18 RX ADMIN — ROCURONIUM BROMIDE 20 MG: 10 INJECTION, SOLUTION INTRAVENOUS at 11:36:00

## 2022-10-18 NOTE — ANESTHESIA PROCEDURE NOTES
Airway  Date/Time: 10/18/2022 11:37 AM  Urgency: elective    Airway not difficult    General Information and Staff    Patient location during procedure: OR  Anesthesiologist: Francine Noriega MD  Performed: anesthesiologist     Indications and Patient Condition  Indications for airway management: anesthesia  Sedation level: deep  Preoxygenated: yes  Patient position: sniffing  Mask difficulty assessment: 1 - vent by mask  Planned trial extubation    Final Airway Details  Final airway type: endotracheal airway      Successful airway: ETT  Cuffed: yes   Successful intubation technique: direct laryngoscopy  Endotracheal tube insertion site: oral  Blade: Sowmya  Blade size: #3  ETT size (mm): 7.5    Placement verified by: chest auscultation and capnometry   Measured from: lips  ETT to lips (cm): 20  Number of attempts at approach: 1

## 2022-10-18 NOTE — PLAN OF CARE
Assumed care at 1930  A&Ox4, 6L NC, NSR on tele  CT to continuous wall suction with new brown/green output, Dr Sangita Osuna aware.    IV abx given per order  Prn dilaudid and scheduled tylenol given for pain NPO   J tube site leaking green/bile serous purulent fluid; dressing C/D/I   Voiding per urinal    Meds given per J tube   Prn robitussin given for cough with relief   Prn lopressor given for elevated bp with relief   Call light within reach    Patient updated on plan of care

## 2022-10-18 NOTE — IMAGING NOTE
24 hr right chest tube output 250 ml, previously 1420. Pt underwent EGD with leak closure and stent placement today. Continue chest tube drainage, management per pulmonary and surgery.

## 2022-10-18 NOTE — PLAN OF CARE
Assumed care around 0730. AxO x4.  6L NC, NSR on tele, VSS. Converted to Afib RVR around 1545, IV Cardizem given, converted back to NSR around 1630. Hospitalist and surg onc aware. Pt c/o pain 7/10, PRN Dilaudid given with relief. EGD completed today. Strict NPO, tube feeds restarted via J tube. J tube site leaking, dressing C/D/I. Pt updated on poc. Pt needs met, call light within reach.

## 2022-10-18 NOTE — CM/SW NOTE
MARIZA sent consults/progress notes to [facility] and [facility]. SW to remain available for dc planning needs and recommendations.      Lawrence Kumar, BENY  Discharge [address].

## 2022-10-19 ENCOUNTER — APPOINTMENT (OUTPATIENT)
Dept: GENERAL RADIOLOGY | Facility: HOSPITAL | Age: 52
End: 2022-10-19
Attending: INTERNAL MEDICINE
Payer: COMMERCIAL

## 2022-10-19 LAB
AMYLASE FLD-CCNC: 4905 U/L
ANION GAP SERPL CALC-SCNC: 4 MMOL/L (ref 0–18)
ATRIAL RATE: 150 BPM
BASOPHILS NFR FLD: 0 %
BILIRUB FLD-MCNC: 0.9 MG/DL
BUN BLD-MCNC: 18 MG/DL (ref 7–18)
CALCIUM BLD-MCNC: 8.7 MG/DL (ref 8.5–10.1)
CHLORIDE SERPL-SCNC: 104 MMOL/L (ref 98–112)
CO2 SERPL-SCNC: 31 MMOL/L (ref 21–32)
COLOR FLD: YELLOW
CREAT BLD-MCNC: 0.55 MG/DL
CREAT BLD-MCNC: 0.55 MG/DL
EOSINOPHIL NFR FLD: 0 %
GFR SERPLBLD BASED ON 1.73 SQ M-ARVRAT: 119 ML/MIN/1.73M2 (ref 60–?)
GFR SERPLBLD BASED ON 1.73 SQ M-ARVRAT: 119 ML/MIN/1.73M2 (ref 60–?)
GLUCOSE BLD-MCNC: 119 MG/DL (ref 70–99)
GLUCOSE BLD-MCNC: 128 MG/DL (ref 70–99)
GLUCOSE BLD-MCNC: 130 MG/DL (ref 70–99)
GLUCOSE BLD-MCNC: 137 MG/DL (ref 70–99)
GLUCOSE BLD-MCNC: 153 MG/DL (ref 70–99)
GLUCOSE FLD-MCNC: 2 MG/DL
LDH FLD L TO P-CCNC: >4000 U/L
LYMPHOCYTES NFR FLD: 2 %
MAGNESIUM SERPL-MCNC: 2.1 MG/DL (ref 1.6–2.6)
MONOS+MACROS NFR FLD: 2 %
NEUTROPHILS NFR FLD: 96 %
OSMOLALITY SERPL CALC.SUM OF ELEC: 293 MOSM/KG (ref 275–295)
PHOSPHATE SERPL-MCNC: 1.8 MG/DL (ref 2.5–4.9)
POTASSIUM SERPL-SCNC: 3.6 MMOL/L (ref 3.5–5.1)
PROT PLR-MCNC: 2.6 G/DL
Q-T INTERVAL: 308 MS
QRS DURATION: 84 MS
QTC CALCULATION (BEZET): 465 MS
R AXIS: 21 DEGREES
RBC # FLD AUTO: NORMAL /MM3
SODIUM SERPL-SCNC: 139 MMOL/L (ref 136–145)
T AXIS: 30 DEGREES
TOTAL CELLS COUNTED FLD: 100
VENTRICULAR RATE: 137 BPM
WBC # FLD AUTO: NORMAL /MM3

## 2022-10-19 PROCEDURE — 71045 X-RAY EXAM CHEST 1 VIEW: CPT | Performed by: INTERNAL MEDICINE

## 2022-10-19 PROCEDURE — 99233 SBSQ HOSP IP/OBS HIGH 50: CPT | Performed by: HOSPITALIST

## 2022-10-19 RX ORDER — POTASSIUM CHLORIDE 14.9 MG/ML
20 INJECTION INTRAVENOUS ONCE
Status: COMPLETED | OUTPATIENT
Start: 2022-10-19 | End: 2022-10-19

## 2022-10-19 RX ORDER — OXYCODONE HCL 5 MG/5 ML
5 SOLUTION, ORAL ORAL EVERY 4 HOURS
Status: DISCONTINUED | OUTPATIENT
Start: 2022-10-19 | End: 2022-10-21

## 2022-10-19 RX ORDER — SERTRALINE HYDROCHLORIDE 20 MG/ML
50 SOLUTION ORAL DAILY
Status: DISCONTINUED | OUTPATIENT
Start: 2022-10-19 | End: 2022-10-26

## 2022-10-19 NOTE — PROGRESS NOTES
Assumed care @ 1930. Pt a/o x4, VSS. Tele SR. Rt chest tube in place, -20 pressure to wall suction. No air bubble noted. No output noted during current shift yet. No acute respiratory distress noted. C/O abd pain, 7/10. PRN IV Dilaudid given with some relief. TF per J-tube, no residual after 4 hours of starting TF. TF rate advanced to 45 mL/hr @ 2145. Pt resting in bed. (-)BM. Pt c/o sleep deprived and asking this RN to cluster care overnight. No other complaints made. Will continue to monitor.

## 2022-10-19 NOTE — PROGRESS NOTES
10/19/22 1143   Clinical Encounter Type   Visited With Patient   Taxonomy   Intended Effects Build relationship of care and support   Methods Offer support   Interventions Silent prayer; Active listening      contacted nurse to verify if patient was a good candidate for phone visit due to covid positive. Nurse advised patient is.  called patient room. Presumed family member advised that patient was speaking with doctors and no spiritual care is needed.  remains available for support. Spiritual Care support can be requested via an Epic consult. COURTNEY Conte Div  Extension: [phone]

## 2022-10-19 NOTE — PLAN OF CARE
Assumed care at 1530  Pt AxO4  4 L NC  -use IS every hour 10x  -wean as tolerated  -Chest tube in place   =continuous suction   =output noted   =MIST protocol started  NSR/ST  Pain present; abdominal pain  -8-10/10; IV dilaudid, IV tylenol, oxycodone gives some relief  1x assist    GI  -hypoactive bowel sounds  -passing gas(-), belching(+)  -no BM today  -J tube   =TF infusing as ordered    ->rate goal met   =water flushes as ordered   =wound site continues to leak    ->green/yellow output, saturated dressing    -adequate urine output  Wound care consulted  IV fluids  -dc per order; TF rate goal met  IV antibiotics  K and P replaced  Pt and family informed of POC, all questions answered

## 2022-10-20 ENCOUNTER — APPOINTMENT (OUTPATIENT)
Dept: ULTRASOUND IMAGING | Facility: HOSPITAL | Age: 52
End: 2022-10-20
Attending: HOSPITALIST
Payer: COMMERCIAL

## 2022-10-20 LAB
CREAT BLD-MCNC: 0.54 MG/DL
GFR SERPLBLD BASED ON 1.73 SQ M-ARVRAT: 119 ML/MIN/1.73M2 (ref 60–?)
GLUCOSE BLD-MCNC: 110 MG/DL (ref 70–99)
GLUCOSE BLD-MCNC: 117 MG/DL (ref 70–99)
GLUCOSE BLD-MCNC: 91 MG/DL (ref 70–99)
GLUCOSE BLD-MCNC: 97 MG/DL (ref 70–99)
PHOSPHATE SERPL-MCNC: 2.4 MG/DL (ref 2.5–4.9)
POTASSIUM SERPL-SCNC: 4.9 MMOL/L (ref 3.5–5.1)

## 2022-10-20 PROCEDURE — 93971 EXTREMITY STUDY: CPT | Performed by: HOSPITALIST

## 2022-10-20 PROCEDURE — 99232 SBSQ HOSP IP/OBS MODERATE 35: CPT | Performed by: HOSPITALIST

## 2022-10-20 RX ORDER — SODIUM CHLORIDE 9 MG/ML
INJECTION, SOLUTION INTRAVENOUS CONTINUOUS
Status: ACTIVE | OUTPATIENT
Start: 2022-10-20 | End: 2022-10-23

## 2022-10-20 RX ORDER — METOCLOPRAMIDE HYDROCHLORIDE 5 MG/ML
10 INJECTION INTRAMUSCULAR; INTRAVENOUS EVERY 8 HOURS PRN
Status: DISCONTINUED | OUTPATIENT
Start: 2022-10-20 | End: 2022-10-26

## 2022-10-20 NOTE — PLAN OF CARE
Pt is aox4  NSR  RA  Pt complains of pain   -PRN's and scheduled. Some relief  IV antibiotics  Chest Tube   -MIST protocol  GI   Jtube    -tube feeding     -started new tube feed. Vital 1.2 going at 30. Pt states he can taste the feed. Surgical onc and Dietary notified.     -hypoactive bowel sounds     -Good urine output per surgical onc  All pt questions answered

## 2022-10-20 NOTE — PLAN OF CARE
Received care @ 1930  Pt A&Ox4  RA NS on tele  Continuous tube feeds held per patient request; pt states he was nauseous; residual checked, patient not complaining of abdominal pain/ distention; MD notified   CT with very minimal output; MIST2 protocol implemented   IV dilaudid, scheduled Tylenol & Oxy used for pain   IV metoprolol given once for elevated BP   IV Zosyn infusing per order  IS encouraged @ bedside     Updated on POC

## 2022-10-21 ENCOUNTER — APPOINTMENT (OUTPATIENT)
Dept: GENERAL RADIOLOGY | Facility: HOSPITAL | Age: 52
End: 2022-10-21
Attending: PHYSICIAN ASSISTANT
Payer: COMMERCIAL

## 2022-10-21 ENCOUNTER — APPOINTMENT (OUTPATIENT)
Dept: GENERAL RADIOLOGY | Facility: HOSPITAL | Age: 52
End: 2022-10-21
Attending: STUDENT IN AN ORGANIZED HEALTH CARE EDUCATION/TRAINING PROGRAM
Payer: COMMERCIAL

## 2022-10-21 LAB
ANION GAP SERPL CALC-SCNC: 6 MMOL/L (ref 0–18)
BUN BLD-MCNC: 14 MG/DL (ref 7–18)
CALCIUM BLD-MCNC: 9 MG/DL (ref 8.5–10.1)
CHLORIDE SERPL-SCNC: 105 MMOL/L (ref 98–112)
CO2 SERPL-SCNC: 29 MMOL/L (ref 21–32)
CREAT BLD-MCNC: 0.62 MG/DL
CREAT BLD-MCNC: 0.62 MG/DL
ERYTHROCYTE [DISTWIDTH] IN BLOOD BY AUTOMATED COUNT: 15 %
GFR SERPLBLD BASED ON 1.73 SQ M-ARVRAT: 114 ML/MIN/1.73M2 (ref 60–?)
GFR SERPLBLD BASED ON 1.73 SQ M-ARVRAT: 114 ML/MIN/1.73M2 (ref 60–?)
GLUCOSE BLD-MCNC: 113 MG/DL (ref 70–99)
GLUCOSE BLD-MCNC: 116 MG/DL (ref 70–99)
GLUCOSE BLD-MCNC: 126 MG/DL (ref 70–99)
GLUCOSE BLD-MCNC: 91 MG/DL (ref 70–99)
GLUCOSE BLD-MCNC: 97 MG/DL (ref 70–99)
HCT VFR BLD AUTO: 37.3 %
HGB BLD-MCNC: 11.6 G/DL
MAGNESIUM SERPL-MCNC: 2 MG/DL (ref 1.6–2.6)
MCH RBC QN AUTO: 31.4 PG (ref 26–34)
MCHC RBC AUTO-ENTMCNC: 31.1 G/DL (ref 31–37)
MCV RBC AUTO: 100.8 FL
OSMOLALITY SERPL CALC.SUM OF ELEC: 291 MOSM/KG (ref 275–295)
PHOSPHATE SERPL-MCNC: 4.2 MG/DL (ref 2.5–4.9)
PLATELET # BLD AUTO: 411 10(3)UL (ref 150–450)
POTASSIUM SERPL-SCNC: 4 MMOL/L (ref 3.5–5.1)
RBC # BLD AUTO: 3.7 X10(6)UL
SODIUM SERPL-SCNC: 140 MMOL/L (ref 136–145)
WBC # BLD AUTO: 17.6 X10(3) UL (ref 4–11)

## 2022-10-21 PROCEDURE — 71045 X-RAY EXAM CHEST 1 VIEW: CPT | Performed by: STUDENT IN AN ORGANIZED HEALTH CARE EDUCATION/TRAINING PROGRAM

## 2022-10-21 PROCEDURE — 74018 RADEX ABDOMEN 1 VIEW: CPT | Performed by: PHYSICIAN ASSISTANT

## 2022-10-21 PROCEDURE — 99233 SBSQ HOSP IP/OBS HIGH 50: CPT | Performed by: STUDENT IN AN ORGANIZED HEALTH CARE EDUCATION/TRAINING PROGRAM

## 2022-10-21 PROCEDURE — 74240 X-RAY XM UPR GI TRC 1CNTRST: CPT | Performed by: PHYSICIAN ASSISTANT

## 2022-10-21 RX ORDER — OXYCODONE HCL 5 MG/5 ML
5 SOLUTION, ORAL ORAL EVERY 4 HOURS PRN
Status: DISCONTINUED | OUTPATIENT
Start: 2022-10-21 | End: 2022-10-25

## 2022-10-21 NOTE — PROGRESS NOTES
[facility]  Progress Note      [facility]  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

## 2022-10-21 NOTE — PLAN OF CARE
Assumed care of patient at 1900. Alert and oriented x4. Complaints of generalized pain and pain to right ankle/calf area. Strict NPO. PRN metoprolol given for elevated BP. Meds given through J-tube. IVF infusing, IV antibiotics as ordered. IV Tylenol, Oxy and dilaudid for pain. Ct to -20 cm suction, MIST protocol. Call light within reach, will continue to monitor. 2050- CT Suction resumed s/p MIST protocol. Approximately 0200- Patient c/o right shoulder pain. Dilaudid given for pain. CT atrium full, RN noted 625 mL's dark red output since resumption of suction. Atrium changed. Lung sounds diminished with stable VS. Atrium changed. Dr. Erick Marques paged and made aware of CT output. Informed to hold next MIST treatment until pulmonary rounds. 50 mL's of SS output in new atrium chamber. Total shift output 675 mL's.

## 2022-10-21 NOTE — PLAN OF CARE
Pt is aox4  NSR  RA  Pt complains of pain               -PRN's and scheduled. Some relief   -PCA pump started   IV antibiotics  Chest Tube               -MIST protocol  GI               Jtube                            -tube feeding started                             -started new tube feed.  Kpharm 1.4 going at 20.               -hypoactive bowel sounds                 -Good urine output per surgical onc  Upper GI study completed  Xray of abdomen completed   Diet upgraded to clear.   -pt tolerating   All pt questions answered

## 2022-10-22 LAB
CREAT BLD-MCNC: 0.57 MG/DL
ERYTHROCYTE [DISTWIDTH] IN BLOOD BY AUTOMATED COUNT: 15.1 %
GFR SERPLBLD BASED ON 1.73 SQ M-ARVRAT: 117 ML/MIN/1.73M2 (ref 60–?)
GLUCOSE BLD-MCNC: 100 MG/DL (ref 70–99)
GLUCOSE BLD-MCNC: 101 MG/DL (ref 70–99)
GLUCOSE BLD-MCNC: 104 MG/DL (ref 70–99)
GLUCOSE BLD-MCNC: 93 MG/DL (ref 70–99)
HCT VFR BLD AUTO: 34.5 %
HGB BLD-MCNC: 10.9 G/DL
MCH RBC QN AUTO: 31.5 PG (ref 26–34)
MCHC RBC AUTO-ENTMCNC: 31.6 G/DL (ref 31–37)
MCV RBC AUTO: 99.7 FL
PHOSPHATE SERPL-MCNC: 3 MG/DL (ref 2.5–4.9)
PLATELET # BLD AUTO: 397 10(3)UL (ref 150–450)
RBC # BLD AUTO: 3.46 X10(6)UL
WBC # BLD AUTO: 17.2 X10(3) UL (ref 4–11)

## 2022-10-22 PROCEDURE — 99233 SBSQ HOSP IP/OBS HIGH 50: CPT | Performed by: STUDENT IN AN ORGANIZED HEALTH CARE EDUCATION/TRAINING PROGRAM

## 2022-10-22 NOTE — PLAN OF CARE
Assumed care around 0730. AxO x4. NSR on tele, RA, VSS. Pt c/o pain, Dilaudid PCA in place. Pt c/o sore throat and painful cough, PRN Robitussin and throat lozenge given with relief. Pt c/o nausea, PRN Zofran given with relief. MIST protocol to be completed today. Plan: start TPN tonight, possible VATS Monday. Pt updated on poc. Pt needs met, call light within reach.

## 2022-10-22 NOTE — PLAN OF CARE
Assumed pt care at [address] [place] [address] for the night shift. Pt resting in bed. Reports abd discomfort. Tube feedings stopped at the change of shift. J-tube clamped. Dilaudid PCA in place. Oxy and Zofran given PRN w/ adequate relief. Rt CT intact to-20 cm suction w/ ss output. Void per urinal in good amounts. VSS. Afebrile. NSR on tele. Remains on RA w/ O2 sats > 92%. Pt updated on poc. All safety precautions in place. Will monitor.   Problem: PAIN - ADULT  Goal: Verbalizes/displays adequate comfort level or patient's stated pain goal  Description: INTERVENTIONS:  - Encourage pt to monitor pain and request assistance  - Assess pain using appropriate pain scale  - Administer analgesics based on type and severity of pain and evaluate response  - Implement non-pharmacological measures as appropriate and evaluate response  - Consider cultural and social influences on pain and pain management  - Manage/alleviate anxiety  - Utilize distraction and/or relaxation techniques  - Monitor for opioid side effects  - Notify MD/LIP if interventions unsuccessful or patient reports new pain  - Anticipate increased pain with activity and pre-medicate as appropriate  Outcome: Progressing     Problem: RESPIRATORY - ADULT  Goal: Achieves optimal ventilation and oxygenation  Description: INTERVENTIONS:  - Assess for changes in respiratory status  - Assess for changes in mentation and behavior  - Position to facilitate oxygenation and minimize respiratory effort  - Oxygen supplementation based on oxygen saturation or ABGs  - Provide Smoking Cessation handout, if applicable  - Encourage broncho-pulmonary hygiene including cough, deep breathe, Incentive Spirometry  - Assess the need for suctioning and perform as needed  - Assess and instruct to report SOB or any respiratory difficulty  - Respiratory Therapy support as indicated  - Manage/alleviate anxiety  - Monitor for signs/symptoms of CO2 retention  Outcome: Progressing     Problem: CARDIOVASCULAR - ADULT  Goal: Maintains optimal cardiac output and hemodynamic stability  Description: INTERVENTIONS:  - Monitor vital signs, rhythm, and trends  - Monitor for bleeding, hypotension and signs of decreased cardiac output  - Evaluate effectiveness of vasoactive medications to optimize hemodynamic stability  - Monitor arterial and/or venous puncture sites for bleeding and/or hematoma  - Assess quality of pulses, skin color and temperature  - Assess for signs of decreased coronary artery perfusion - ex.  Angina  - Evaluate fluid balance, assess for edema, trend weights  Outcome: Progressing  Goal: Absence of cardiac arrhythmias or at baseline  Description: INTERVENTIONS:  - Continuous cardiac monitoring, monitor vital signs, obtain 12 lead EKG if indicated  - Evaluate effectiveness of antiarrhythmic and heart rate control medications as ordered  - Initiate emergency measures for life threatening arrhythmias  - Monitor electrolytes and administer replacement therapy as ordered  Outcome: Progressing     Problem: HEMATOLOGIC - ADULT  Goal: Free from bleeding injury  Description: (Example usage: patient with low platelets)  INTERVENTIONS:  - Avoid intramuscular injections, enemas and rectal medication administration  - Ensure safe mobilization of patient  - Hold pressure on venipuncture sites to achieve adequate hemostasis  - Assess for signs and symptoms of internal bleeding  - Monitor lab trends  - Patient is to report abnormal signs of bleeding to staff  - Avoid use of toothpicks and dental floss  - Use electric shaver for shaving  - Use soft bristle tooth brush  - Limit straining and forceful nose blowing  Outcome: Progressing

## 2022-10-23 LAB
ALBUMIN SERPL-MCNC: 1.4 G/DL (ref 3.4–5)
ALBUMIN/GLOB SERPL: 0.3 {RATIO} (ref 1–2)
ALP LIVER SERPL-CCNC: 77 U/L
ALT SERPL-CCNC: 16 U/L
ANION GAP SERPL CALC-SCNC: 7 MMOL/L (ref 0–18)
AST SERPL-CCNC: 17 U/L (ref 15–37)
BILIRUB SERPL-MCNC: 0.4 MG/DL (ref 0.1–2)
BUN BLD-MCNC: 13 MG/DL (ref 7–18)
C DIFF TOX B STL QL: POSITIVE
CALCIUM BLD-MCNC: 8.2 MG/DL (ref 8.5–10.1)
CHLORIDE SERPL-SCNC: 102 MMOL/L (ref 98–112)
CO2 SERPL-SCNC: 27 MMOL/L (ref 21–32)
CREAT BLD-MCNC: 0.43 MG/DL
CREAT BLD-MCNC: 0.49 MG/DL
GFR SERPLBLD BASED ON 1.73 SQ M-ARVRAT: 123 ML/MIN/1.73M2 (ref 60–?)
GFR SERPLBLD BASED ON 1.73 SQ M-ARVRAT: 128 ML/MIN/1.73M2 (ref 60–?)
GLOBULIN PLAS-MCNC: 4.1 G/DL (ref 2.8–4.4)
GLUCOSE BLD-MCNC: 71 MG/DL (ref 70–99)
GLUCOSE BLD-MCNC: 77 MG/DL (ref 70–99)
GLUCOSE BLD-MCNC: 91 MG/DL (ref 70–99)
GLUCOSE BLD-MCNC: 93 MG/DL (ref 70–99)
GLUCOSE BLD-MCNC: 94 MG/DL (ref 70–99)
MAGNESIUM SERPL-MCNC: 1.9 MG/DL (ref 1.6–2.6)
OSMOLALITY SERPL CALC.SUM OF ELEC: 282 MOSM/KG (ref 275–295)
PHOSPHATE SERPL-MCNC: 2.8 MG/DL (ref 2.5–4.9)
POTASSIUM SERPL-SCNC: 3.7 MMOL/L (ref 3.5–5.1)
PROT SERPL-MCNC: 5.5 G/DL (ref 6.4–8.2)
SODIUM SERPL-SCNC: 136 MMOL/L (ref 136–145)

## 2022-10-23 PROCEDURE — 3E0436Z INTRODUCTION OF NUTRITIONAL SUBSTANCE INTO CENTRAL VEIN, PERCUTANEOUS APPROACH: ICD-10-PCS | Performed by: STUDENT IN AN ORGANIZED HEALTH CARE EDUCATION/TRAINING PROGRAM

## 2022-10-23 PROCEDURE — 99233 SBSQ HOSP IP/OBS HIGH 50: CPT | Performed by: HOSPITALIST

## 2022-10-23 RX ORDER — POTASSIUM CHLORIDE 1.5 G/1.77G
40 POWDER, FOR SOLUTION ORAL ONCE
Status: COMPLETED | OUTPATIENT
Start: 2022-10-23 | End: 2022-10-23

## 2022-10-23 RX ORDER — HYDRALAZINE HYDROCHLORIDE 20 MG/ML
10 INJECTION INTRAMUSCULAR; INTRAVENOUS EVERY 6 HOURS PRN
Status: DISCONTINUED | OUTPATIENT
Start: 2022-10-23 | End: 2022-10-26

## 2022-10-23 RX ORDER — VANCOMYCIN HYDROCHLORIDE 125 MG/1
125 CAPSULE ORAL EVERY 6 HOURS
Status: DISCONTINUED | OUTPATIENT
Start: 2022-10-23 | End: 2022-10-23

## 2022-10-23 NOTE — DIETARY NOTE
[facility]  Clinical Nutrition - TPN Follow-up    ALEX RODRIGUEZ    Consult to start TPN tonight. Pt is not tolerating tubefeeding. Pt states he is nauseous with tubefeedings. Tonight's TPN to provide 500 dextrose calories, 310 lipid calories 30%, 60g AA in 2100ml to provide 1050 total calories, 50% minimum calorie needs, % 52protein needs. Electrolytes adjusted per AM labs. Goal TPN: 895 calories from dextrose, 310 calories from lipids (30%), and 115g AA. This will provide 2100kcal.     Recent Labs     10/20/22  0624 10/21/22  0625 10/22/22  0554 10/23/22  0539   GLU  --  113*  --  93   BUN  --  14  --  13   CREATSERUM 0.54* 0.62*  0.62* 0.57* 0.49*  0.43*   CA  --  9.0  --  8.2*   MG  --  2.0  --  1.9   NA  --  140  --  136   K 4.9 4.0  --  3.7   CL  --  105  --  102   CO2  --  29.0  --  27.0   PHOS 2.4* 4.2 3.0 2.8   OSMOCALC  --  291  --  282       RD will write TPN daily and follow per protocol. See last assessment 21. Pt is at high nutrition risk.     Dejan Lehman. Sameer Martinez [id number] [facility]

## 2022-10-23 NOTE — PLAN OF CARE
Assumed care at 0700. A & O x 4. RA.  NSR on tele. Positive for C Diff. Enteric/ contact precautions initiated and vancomycin started. Dr Kalyn Kurtz and Dr J Luis Daniels notified. Right chest tube to wall suction -20 cm H2O. Draining serous output. CT noted clamped this AM. Dr J Luis Daniels and Roberto George notifed. Higinio Heart paged x 2. MIST protocol continued. Patient reporting pain 7/10. PCA dilaudid, PRN oxy and scheduled IV Tylenol given with relief. Nausea precipitated by small amount of fluid intake. Relieved with PRN Zofran and Reglan. Electrolytes replaced per protocol. Voiding per urinal. Loose BM x 1. IVFs and antibiotics infusing. TPN to be started tonight. Patient and family updated on plan of care.

## 2022-10-23 NOTE — PLAN OF CARE
Assumed care at 1. Pt a/ox4, anxious at times. VSS. NSR per tele. RA. Tylenol iv scheduled, oxy prn and pca dilaudid for pain management. IVF infusing. NPO maintained. Right CT to -20cm suction. Pt updated with POC. Call light in reach. Needs attended to.

## 2022-10-24 ENCOUNTER — APPOINTMENT (OUTPATIENT)
Dept: CT IMAGING | Facility: HOSPITAL | Age: 52
End: 2022-10-24
Attending: STUDENT IN AN ORGANIZED HEALTH CARE EDUCATION/TRAINING PROGRAM
Payer: COMMERCIAL

## 2022-10-24 LAB
ALBUMIN SERPL-MCNC: 1.5 G/DL (ref 3.4–5)
ALBUMIN/GLOB SERPL: 0.3 {RATIO} (ref 1–2)
ALP LIVER SERPL-CCNC: 86 U/L
ALT SERPL-CCNC: 16 U/L
ANION GAP SERPL CALC-SCNC: 6 MMOL/L (ref 0–18)
AST SERPL-CCNC: 19 U/L (ref 15–37)
BILIRUB SERPL-MCNC: 0.4 MG/DL (ref 0.1–2)
BUN BLD-MCNC: 8 MG/DL (ref 7–18)
CALCIUM BLD-MCNC: 8.4 MG/DL (ref 8.5–10.1)
CHLORIDE SERPL-SCNC: 100 MMOL/L (ref 98–112)
CO2 SERPL-SCNC: 28 MMOL/L (ref 21–32)
CREAT BLD-MCNC: 0.48 MG/DL
CREAT BLD-MCNC: 0.48 MG/DL
ERYTHROCYTE [DISTWIDTH] IN BLOOD BY AUTOMATED COUNT: 15.2 %
GFR SERPLBLD BASED ON 1.73 SQ M-ARVRAT: 123 ML/MIN/1.73M2 (ref 60–?)
GFR SERPLBLD BASED ON 1.73 SQ M-ARVRAT: 123 ML/MIN/1.73M2 (ref 60–?)
GLOBULIN PLAS-MCNC: 4.4 G/DL (ref 2.8–4.4)
GLUCOSE BLD-MCNC: 122 MG/DL (ref 70–99)
GLUCOSE BLD-MCNC: 131 MG/DL (ref 70–99)
GLUCOSE BLD-MCNC: 132 MG/DL (ref 70–99)
GLUCOSE BLD-MCNC: 139 MG/DL (ref 70–99)
GLUCOSE BLD-MCNC: 142 MG/DL (ref 70–99)
GLUCOSE BLD-MCNC: 97 MG/DL (ref 70–99)
HCT VFR BLD AUTO: 31.6 %
HGB BLD-MCNC: 10.1 G/DL
MAGNESIUM SERPL-MCNC: 1.8 MG/DL (ref 1.6–2.6)
MCH RBC QN AUTO: 30.9 PG (ref 26–34)
MCHC RBC AUTO-ENTMCNC: 32 G/DL (ref 31–37)
MCV RBC AUTO: 96.6 FL
OSMOLALITY SERPL CALC.SUM OF ELEC: 279 MOSM/KG (ref 275–295)
PHOSPHATE SERPL-MCNC: 2.9 MG/DL (ref 2.5–4.9)
PLATELET # BLD AUTO: 369 10(3)UL (ref 150–450)
POTASSIUM SERPL-SCNC: 3.7 MMOL/L (ref 3.5–5.1)
POTASSIUM SERPL-SCNC: 3.7 MMOL/L (ref 3.5–5.1)
PROT SERPL-MCNC: 5.9 G/DL (ref 6.4–8.2)
RBC # BLD AUTO: 3.27 X10(6)UL
SODIUM SERPL-SCNC: 134 MMOL/L (ref 136–145)
TRIGL SERPL-MCNC: 94 MG/DL (ref 30–149)
WBC # BLD AUTO: 16.2 X10(3) UL (ref 4–11)

## 2022-10-24 PROCEDURE — 71260 CT THORAX DX C+: CPT | Performed by: STUDENT IN AN ORGANIZED HEALTH CARE EDUCATION/TRAINING PROGRAM

## 2022-10-24 PROCEDURE — 99232 SBSQ HOSP IP/OBS MODERATE 35: CPT | Performed by: HOSPITALIST

## 2022-10-24 RX ORDER — MAGNESIUM SULFATE HEPTAHYDRATE 40 MG/ML
2 INJECTION, SOLUTION INTRAVENOUS ONCE
Status: COMPLETED | OUTPATIENT
Start: 2022-10-24 | End: 2022-10-24

## 2022-10-24 RX ORDER — CHOLESTYRAMINE LIGHT 4 G/5.7G
4 POWDER, FOR SUSPENSION ORAL 2 TIMES DAILY PRN
Status: DISCONTINUED | OUTPATIENT
Start: 2022-10-24 | End: 2022-10-26

## 2022-10-24 RX ORDER — POTASSIUM CHLORIDE 1.5 G/1.77G
40 POWDER, FOR SOLUTION ORAL ONCE
Status: COMPLETED | OUTPATIENT
Start: 2022-10-24 | End: 2022-10-24

## 2022-10-24 RX ORDER — IOHEXOL 350 MG/ML
75 INJECTION, SOLUTION INTRAVENOUS
Status: COMPLETED | OUTPATIENT
Start: 2022-10-24 | End: 2022-10-24

## 2022-10-24 NOTE — PLAN OF CARE
Assumed patient care at 1930  Patient alert and orientated x 4  VSS. HR in the high 90s to low 100s. NSR to ST on the monitor  Placed 2L O2 overnight for desaturation into the low 80s with sleep  Isolation maintained for positive C-diff. No bowel movements this shift  Right chest tube to wall suction -20 cm H2O. Draining serosanguinous output with small clots and debris. Total output overnight is 300mLs  Patient pain controlled with PCA dilaudid, PRN oxy and scheduled IV Tylenol. No nausea overnight. PRN Reglan administered for complaints of heartburn patient was concerned would become nausea. Patient utilizes urinal for voiding. IVFs and antibiotics infused as ordered  TPN started per order and tolerated well  All safety precautions maintained  Will continue to monitor.       Problem: RESPIRATORY - ADULT  Goal: Achieves optimal ventilation and oxygenation  Description: INTERVENTIONS:  - Assess for changes in respiratory status  - Assess for changes in mentation and behavior  - Position to facilitate oxygenation and minimize respiratory effort  - Oxygen supplementation based on oxygen saturation or ABGs  - Provide Smoking Cessation handout, if applicable  - Encourage broncho-pulmonary hygiene including cough, deep breathe, Incentive Spirometry  - Assess the need for suctioning and perform as needed  - Assess and instruct to report SOB or any respiratory difficulty  - Respiratory Therapy support as indicated  - Manage/alleviate anxiety  - Monitor for signs/symptoms of CO2 retention  Outcome: Progressing     Problem: CARDIOVASCULAR - ADULT  Goal: Maintains optimal cardiac output and hemodynamic stability  Description: INTERVENTIONS:  - Monitor vital signs, rhythm, and trends  - Monitor for bleeding, hypotension and signs of decreased cardiac output  - Evaluate effectiveness of vasoactive medications to optimize hemodynamic stability  - Monitor arterial and/or venous puncture sites for bleeding and/or hematoma  - Assess quality of pulses, skin color and temperature  - Assess for signs of decreased coronary artery perfusion - ex.  Angina  - Evaluate fluid balance, assess for edema, trend weights  Outcome: Progressing     Problem: HEMATOLOGIC - ADULT  Goal: Free from bleeding injury  Description: (Example usage: patient with low platelets)  INTERVENTIONS:  - Avoid intramuscular injections, enemas and rectal medication administration  - Ensure safe mobilization of patient  - Hold pressure on venipuncture sites to achieve adequate hemostasis  - Assess for signs and symptoms of internal bleeding  - Monitor lab trends  - Patient is to report abnormal signs of bleeding to staff  - Avoid use of toothpicks and dental floss  - Use electric shaver for shaving  - Use soft bristle tooth brush  - Limit straining and forceful nose blowing  Outcome: Progressing     Problem: METABOLIC/FLUID AND ELECTROLYTES - ADULT  Goal: Electrolytes maintained within normal limits  Description: INTERVENTIONS:  - Monitor labs and rhythm and assess patient for signs and symptoms of electrolyte imbalances  - Administer electrolyte replacement as ordered  - Monitor response to electrolyte replacements, including rhythm and repeat lab results as appropriate  - Fluid restriction as ordered  - Instruct patient on fluid and nutrition restrictions as appropriate  Outcome: Progressing     Problem: PAIN - ADULT  Goal: Verbalizes/displays adequate comfort level or patient's stated pain goal  Description: INTERVENTIONS:  - Encourage pt to monitor pain and request assistance  - Assess pain using appropriate pain scale  - Administer analgesics based on type and severity of pain and evaluate response  - Implement non-pharmacological measures as appropriate and evaluate response  - Consider cultural and social influences on pain and pain management  - Manage/alleviate anxiety  - Utilize distraction and/or relaxation techniques  - Monitor for opioid side effects  - Notify MD/LIP if interventions unsuccessful or patient reports new pain  - Anticipate increased pain with activity and pre-medicate as appropriate  Outcome: Progressing

## 2022-10-24 NOTE — IMAGING NOTE
48 yo M anastamotic leak, esophagectomy, R pigtail cath placement. 500+ output charted from tube. MIST per surgery/pulm. Awaiting CT today. Given this degree of output would continue with tube placement. Please call with questions.

## 2022-10-24 NOTE — PLAN OF CARE
Assumed care around 0730. AxO x4. NSR/ST on tele, RA, VSS. Chest tube to wall suction @ -20. Isolation in place and maintained for C. Diff. Pt c/o pain between 5-8, PRN Oxycodone, Dilaudid PCA, and scheduled Tylenol given with relief. Pt c/o heartburn, Dr. Gio Chauhan and Dr. Richar Fournier updated. PRN Prevalite given. Tolerating TPN. Electrolytes replaced per protocol. CT completed today. Pt updated on poc. Safety precautions in place. Pt needs met, call light within reach.

## 2022-10-24 NOTE — CM/SW NOTE
Chart reviewed for dc planning. Updated progress notes sent to [facility] and [facility]. Extended anticipated SOC. SW aware TF held and TPN started. Goal is to return to TF. SW following for dc planning needs and recommendations.      BENY Klein  Discharge 2011 [facility]

## 2022-10-25 LAB
ANION GAP SERPL CALC-SCNC: 5 MMOL/L (ref 0–18)
BUN BLD-MCNC: 8 MG/DL (ref 7–18)
CALCIUM BLD-MCNC: 8.4 MG/DL (ref 8.5–10.1)
CHLORIDE SERPL-SCNC: 103 MMOL/L (ref 98–112)
CO2 SERPL-SCNC: 28 MMOL/L (ref 21–32)
CREAT BLD-MCNC: 0.49 MG/DL
GFR SERPLBLD BASED ON 1.73 SQ M-ARVRAT: 123 ML/MIN/1.73M2 (ref 60–?)
GLUCOSE BLD-MCNC: 125 MG/DL (ref 70–99)
GLUCOSE BLD-MCNC: 130 MG/DL (ref 70–99)
GLUCOSE BLD-MCNC: 146 MG/DL (ref 70–99)
MAGNESIUM SERPL-MCNC: 2.2 MG/DL (ref 1.6–2.6)
MAGNESIUM SERPL-MCNC: 2.3 MG/DL (ref 1.6–2.6)
OSMOLALITY SERPL CALC.SUM OF ELEC: 283 MOSM/KG (ref 275–295)
PHOSPHATE SERPL-MCNC: 2.8 MG/DL (ref 2.5–4.9)
POTASSIUM SERPL-SCNC: 4 MMOL/L (ref 3.5–5.1)
POTASSIUM SERPL-SCNC: 4 MMOL/L (ref 3.5–5.1)
SODIUM SERPL-SCNC: 136 MMOL/L (ref 136–145)

## 2022-10-25 PROCEDURE — 99232 SBSQ HOSP IP/OBS MODERATE 35: CPT | Performed by: HOSPITALIST

## 2022-10-25 RX ORDER — OXYCODONE HCL 5 MG/5 ML
5 SOLUTION, ORAL ORAL EVERY 4 HOURS PRN
Status: DISCONTINUED | OUTPATIENT
Start: 2022-10-25 | End: 2022-10-26

## 2022-10-25 RX ORDER — ACETAMINOPHEN 160 MG/5ML
1000 SOLUTION ORAL EVERY 6 HOURS PRN
Status: DISCONTINUED | OUTPATIENT
Start: 2022-10-25 | End: 2022-10-26

## 2022-10-25 NOTE — CM/SW NOTE
Pt discussed in rounds this AM. Aware pt can dc today. Pt to dc on TPN and IV ABX. Discussed with RN what is needed for TPN referral. Noted pt already current with TF with [facility], however new formula need to be made, need to schedule a delivery and MARIZA to coordinate with [facility] a time for teach/train and RN to come. MARIZA spoke to Camacho Contreras from [facility], requested documentation/clinical sent over. OC to reach out to their pharmacy/RD to start making formula. SW received consult for outpt abx, however no orders in chart yet. SW to send once available. OC unable to confirm at this time if delivery can be made today. [facility] unable to confirm at this time for a [facility] RN. MARIZA updated RN. Addendum: DC Wed for SW to confirm services at [facility]. SW confirmed plans with [facility] and [facility]. SW met with pt, pt upset as he cannot dc today. SW to send RD TPN order and labs tomorrow AM to [facility]. SW to send update to Camryn on DC time to ensure staff available. RN made aware.

## 2022-10-25 NOTE — IMAGING NOTE
24 hour right chest tube output 40 ml, previously 530 ml. CT yesterday shows tube in good position with marked reduction in fluid present prior to procedure and new moderate loculated collection in the anterior medial right chest. Continue chest tube drainage, management per surgery and pulmonary service.

## 2022-10-25 NOTE — PLAN OF CARE
Assumed patient care at 1930  Patient alert and orientated x 4. Cooperative with care  VSS. HR in the 90s. NSR on the monitor  Isolation maintained for positive C-diff. No bowel movements overnight  Right chest tube to wall suction -20 cm H2O. Total output overnight is 0 mLs  Patient pain controlled with PCA dilaudid, PRN oxy and scheduled IV Tylenol. Cough well controlled with robitussin prn. Patient utilizes urinal for voiding. IVFs and antibiotics infused as ordered  TPN infusing at 87.5ml/hr per order and tolerating well  All safety precautions maintained  Will continue to monitor.         Problem: PAIN - ADULT  Goal: Verbalizes/displays adequate comfort level or patient's stated pain goal  Description: INTERVENTIONS:  - Encourage pt to monitor pain and request assistance  - Assess pain using appropriate pain scale  - Administer analgesics based on type and severity of pain and evaluate response  - Implement non-pharmacological measures as appropriate and evaluate response  - Consider cultural and social influences on pain and pain management  - Manage/alleviate anxiety  - Utilize distraction and/or relaxation techniques  - Monitor for opioid side effects  - Notify MD/LIP if interventions unsuccessful or patient reports new pain  - Anticipate increased pain with activity and pre-medicate as appropriate  Outcome: Progressing     Problem: RESPIRATORY - ADULT  Goal: Achieves optimal ventilation and oxygenation  Description: INTERVENTIONS:  - Assess for changes in respiratory status  - Assess for changes in mentation and behavior  - Position to facilitate oxygenation and minimize respiratory effort  - Oxygen supplementation based on oxygen saturation or ABGs  - Provide Smoking Cessation handout, if applicable  - Encourage broncho-pulmonary hygiene including cough, deep breathe, Incentive Spirometry  - Assess the need for suctioning and perform as needed  - Assess and instruct to report SOB or any respiratory difficulty  - Respiratory Therapy support as indicated  - Manage/alleviate anxiety  - Monitor for signs/symptoms of CO2 retention  Outcome: Progressing     Problem: CARDIOVASCULAR - ADULT  Goal: Absence of cardiac arrhythmias or at baseline  Description: INTERVENTIONS:  - Continuous cardiac monitoring, monitor vital signs, obtain 12 lead EKG if indicated  - Evaluate effectiveness of antiarrhythmic and heart rate control medications as ordered  - Initiate emergency measures for life threatening arrhythmias  - Monitor electrolytes and administer replacement therapy as ordered  Outcome: Progressing     Problem: SKIN/TISSUE INTEGRITY - ADULT  Goal: Incision(s), wounds(s) or drain site(s) healing without S/S of infection  Description: INTERVENTIONS:  - Assess and document risk factors for pressure ulcer development  - Assess and document skin integrity  - Assess and document dressing/incision, wound bed, drain sites and surrounding tissue  - Implement wound care per orders  - Initiate isolation precautions as appropriate  - Initiate Pressure Ulcer prevention bundle as indicated  Outcome: Progressing

## 2022-10-25 NOTE — PLAN OF CARE
Assumed care at 0700. VSS. Aox4. NSR. RA.  C/o abdominal pain; prn oxy given. Up standby. Tolerating clear liquid diet. I&O WDL. Chest tube to -20 water seal. Serous drainage. J tube clean, dry and intact. TPN infusing at 08.8; switch to cyclic tonight. PCA discontinued  Discharge home tomorrow possibly on IV abx and TPN.

## 2022-10-25 NOTE — DIETARY NOTE
Evar 14 FOLLOW UP    Anjelica Verdin. Plan to start cyclic TPN in preparation for home with TPN. Tonight's TPN to provide 1050 dextrose calories, 590 lipid calories, 28% lipids, 115 grams protein in 2100 ml fluid. Providing 2100 total calories per day, 100% of total needs. Cycle over 20 hours. Electrolytes adjusted per AM labs. Goal TPN: 1050 dextrose calories, 590 lipid calories, 28% lipids, 115 grams protein in 2100 ml fluid. Providing 2100 total calories per day, 100% of total needs. Intake/Output Summary (Last 24 hours) at 10/25/2022 0920  Last data filed at 10/25/2022 0855  Gross per 24 hour   Intake 2834.83 ml   Output 5065 ml   Net -2230.17 ml     Labs:   Recent Labs   Lab 10/23/22  0539 10/24/22  0551 10/25/22  0509   GLU 93 142* 146*    134* 136   K 3.7 3.7  3.7 4.0  4.0    100 103   CO2 27.0 28.0 28.0   BUN 13 8 8   CREATSERUM 0.49*  0.43* 0.48*  0.48* 0.49*   CA 8.2* 8.4* 8.4*   PHOS 2.8 2.9 2.8   MG 1.9 1.8 2.2  2.3   ALKPHO 77 86  --    AST 17 19  --    ALT 16 16  --    BILT 0.4 0.4  --    TRIG  --  94  --        RD will write TPN daily and follow per protocol. See last assessment 10/24.     Pt is at high nutrition risk    Santi Beaver MS, [place]  Clinical Dietitian  [phone]

## 2022-10-26 VITALS
OXYGEN SATURATION: 96 % | HEIGHT: 74 IN | BODY MASS INDEX: 32.08 KG/M2 | WEIGHT: 250 LBS | HEART RATE: 92 BPM | TEMPERATURE: 98 F | RESPIRATION RATE: 18 BRPM | SYSTOLIC BLOOD PRESSURE: 135 MMHG | DIASTOLIC BLOOD PRESSURE: 82 MMHG

## 2022-10-26 LAB
ANION GAP SERPL CALC-SCNC: 6 MMOL/L (ref 0–18)
ANTIBODY SCREEN: NEGATIVE
BUN BLD-MCNC: 10 MG/DL (ref 7–18)
CALCIUM BLD-MCNC: 8.8 MG/DL (ref 8.5–10.1)
CHLORIDE SERPL-SCNC: 101 MMOL/L (ref 98–112)
CO2 SERPL-SCNC: 26 MMOL/L (ref 21–32)
CREAT BLD-MCNC: 0.47 MG/DL
GFR SERPLBLD BASED ON 1.73 SQ M-ARVRAT: 124 ML/MIN/1.73M2 (ref 60–?)
GLUCOSE BLD-MCNC: 123 MG/DL (ref 70–99)
GLUCOSE BLD-MCNC: 141 MG/DL (ref 70–99)
GLUCOSE BLD-MCNC: 141 MG/DL (ref 70–99)
GLUCOSE BLD-MCNC: 144 MG/DL (ref 70–99)
MAGNESIUM SERPL-MCNC: 2 MG/DL (ref 1.6–2.6)
OSMOLALITY SERPL CALC.SUM OF ELEC: 276 MOSM/KG (ref 275–295)
PHOSPHATE SERPL-MCNC: 3.6 MG/DL (ref 2.5–4.9)
POTASSIUM SERPL-SCNC: 4.3 MMOL/L (ref 3.5–5.1)
RH BLOOD TYPE: POSITIVE
SODIUM SERPL-SCNC: 133 MMOL/L (ref 136–145)

## 2022-10-26 PROCEDURE — 99239 HOSP IP/OBS DSCHRG MGMT >30: CPT | Performed by: STUDENT IN AN ORGANIZED HEALTH CARE EDUCATION/TRAINING PROGRAM

## 2022-10-26 RX ORDER — CHOLESTYRAMINE LIGHT 4 G/5.7G
4 POWDER, FOR SUSPENSION ORAL DAILY
Qty: 14 PACKET | Refills: 0 | Status: SHIPPED | OUTPATIENT
Start: 2022-10-26 | End: 2022-11-09

## 2022-10-26 RX ORDER — OXYCODONE HCL 5 MG/5 ML
5 SOLUTION, ORAL ORAL EVERY 4 HOURS PRN
Qty: 150 ML | Refills: 0 | Status: SHIPPED | OUTPATIENT
Start: 2022-10-26

## 2022-10-26 NOTE — DIETARY NOTE
Melyssaelinstr 14 FOLLOW UP    Terrie Brandt. Tonight's TPN to provide 1050 dextrose calories, 590 lipid calories, 28% lipids, 115 grams protein in 1800 ml fluid. Providing 2100 total calories per day, 100% of total needs. Cycle over 20 hours. Electrolytes adjusted per AM labs. Cycle x 16 hours tonight       Intake/Output Summary (Last 24 hours) at 10/26/2022 0926  Last data filed at 10/26/2022 0738  Gross per 24 hour   Intake 485 ml   Output 4445 ml   Net -3960 ml     Labs:   Recent Labs   Lab 10/23/22  0539 10/24/22  0551 10/25/22  0509 10/26/22  0519   GLU 93 142* 146* 123*    134* 136 133*   K 3.7 3.7  3.7 4.0  4.0 4.3    100 103 101   CO2 27.0 28.0 28.0 26.0   BUN 13 8 8 10   CREATSERUM 0.49*  0.43* 0.48*  0.48* 0.49* 0.47*   CA 8.2* 8.4* 8.4* 8.8   PHOS 2.8 2.9 2.8 3.6   MG 1.9 1.8 2.2  2.3 2.0   ALKPHO 77 86  --   --    AST 17 19  --   --    ALT 16 16  --   --    BILT 0.4 0.4  --   --    TRIG  --  94  --   --        RD will write TPN daily and follow per protocol. See last assessment 10/24.     Pt is at high nutrition risk    Sydni Mosley RD, LDN, [address] [place]   Clinical Dietitian  Phone [phone]

## 2022-10-26 NOTE — PLAN OF CARE
Assumed care at 0700. VSS. Aox4. NSR. RA. Denies pain. Up as tolerated. I&O WDL. Chest tube pulled today; clean, dry, and intact. Serous drainage; changed dressing, abd pad with tegaderm. J tube; clean, dry, and intact  SW set up [facility] for IV abx and TPN at home. NURSING DISCHARGE NOTE    Discharged Home via Wheelchair. Accompanied by Spouse and Support staff  Belongings Taken by patient/family. AVS reviewed and discussed with patient. Medication changes and follow up appointments discussed with patient and family. All questions answered.

## 2022-10-26 NOTE — PLAN OF CARE
Assumed care of pt around 1900. A/o x4. RA. NSR on tele. Pain managed w/ PRN Oxy. TPN infusing. Dressings C/D/I. No output from CT overnight. PRN zofran given for nausea. Voiding per urinal.   Plan for possible DC today. Currently resting in bed w/ call light within reach.

## 2022-10-26 NOTE — CM/SW NOTE
MARIZA sent labs, TPN order and updated progress notes in [facility] to [facility] and [facility]. DC today. MARIZA awaiting for weekly labs, paged x2 to ID. RN to complete orders. [facility] to meet with pt in person today. Weekly lab orders completed, uploaded to aidin. Also written in nursing communication order for clarification on weekly draws. [facility] made aware, coordinating RN for tomorrow. [facility] to meet with pt at [facility] for teach/train.        Addendum: [facility] to meet with pt after 2pm.     Carmelina Mace mission  Discharge 2011 [facility]

## 2022-10-27 ENCOUNTER — TELEPHONE (OUTPATIENT)
Dept: FAMILY MEDICINE CLINIC | Facility: CLINIC | Age: 52
End: 2022-10-27

## 2022-10-27 ENCOUNTER — TELEPHONE (OUTPATIENT)
Dept: SURGERY | Facility: CLINIC | Age: 52
End: 2022-10-27

## 2022-10-27 ENCOUNTER — PATIENT OUTREACH (OUTPATIENT)
Dept: CASE MANAGEMENT | Age: 52
End: 2022-10-27

## 2022-10-27 DIAGNOSIS — Z02.9 ENCOUNTERS FOR UNSPECIFIED ADMINISTRATIVE PURPOSE: ICD-10-CM

## 2022-10-27 DIAGNOSIS — R07.9 CHEST PAIN OF UNCERTAIN ETIOLOGY: ICD-10-CM

## 2022-10-27 NOTE — PAYOR COMM NOTE
Discharge Notification    Patient Name: Avelina Barroso  Payor: Josh BALLARD  Subscriber #: [id number]  Authorization Number: [id number]  Admit Date/Time: 10/15/2022 8:04 PM  Discharge Date/Time: 10/26/2022 5:15 PM

## 2022-10-27 NOTE — TELEPHONE ENCOUNTER
ROCIO, Spoke to pt for TCM today. Pt declined to schedule with PCP at this time. TCM/HFU appt recommended by 11/2/22 as pt is a high risk for readmission.        BOOK BY DATE (last date for TCM): 11/9/22

## 2022-10-27 NOTE — TELEPHONE ENCOUNTER
Called to check on patient after hospital discharge. Pt doing well with pain controlled on pain medication. Pt has no fevers, nausea, or vomiting. Pt stated best night of sleep he has had since September. Pt TPN is being delivered at 2:45 from [facility]. Pt concerned that the vancomycin was not covered with insurance. Let patient know that Dr. Katharina Porras ordered the vancomycin, but we would look into it. Confirmed post op appointment for November 9 with Dr. Mason Patterson and Dr. Debby Wilson.

## 2022-11-02 ENCOUNTER — LAB ENCOUNTER (OUTPATIENT)
Dept: LAB | Age: 52
End: 2022-11-02
Attending: FAMILY MEDICINE
Payer: COMMERCIAL

## 2022-11-02 ENCOUNTER — OFFICE VISIT (OUTPATIENT)
Dept: FAMILY MEDICINE CLINIC | Facility: CLINIC | Age: 52
End: 2022-11-02
Payer: COMMERCIAL

## 2022-11-02 ENCOUNTER — PATIENT MESSAGE (OUTPATIENT)
Dept: FAMILY MEDICINE CLINIC | Facility: CLINIC | Age: 52
End: 2022-11-02

## 2022-11-02 VITALS
SYSTOLIC BLOOD PRESSURE: 120 MMHG | BODY MASS INDEX: 31.06 KG/M2 | DIASTOLIC BLOOD PRESSURE: 80 MMHG | HEART RATE: 119 BPM | WEIGHT: 242 LBS | OXYGEN SATURATION: 96 % | RESPIRATION RATE: 16 BRPM | HEIGHT: 74 IN

## 2022-11-02 DIAGNOSIS — R07.9 CHEST PAIN OF UNCERTAIN ETIOLOGY: ICD-10-CM

## 2022-11-02 DIAGNOSIS — J86.9 EMPYEMA (HCC): ICD-10-CM

## 2022-11-02 DIAGNOSIS — J93.12 SECONDARY SPONTANEOUS PNEUMOTHORAX: ICD-10-CM

## 2022-11-02 DIAGNOSIS — K91.89 ESOPHAGEAL ANASTOMOTIC LEAK: ICD-10-CM

## 2022-11-02 DIAGNOSIS — E66.01 MORBID OBESITY WITH BMI OF 40.0-44.9, ADULT (HCC): ICD-10-CM

## 2022-11-02 DIAGNOSIS — I10 ESSENTIAL HYPERTENSION: ICD-10-CM

## 2022-11-02 DIAGNOSIS — Z95.1 S/P CABG X 4: ICD-10-CM

## 2022-11-02 DIAGNOSIS — K91.89 ESOPHAGEAL ANASTOMOTIC LEAK: Primary | ICD-10-CM

## 2022-11-02 DIAGNOSIS — E78.5 HYPERLIPIDEMIA WITH TARGET LOW DENSITY LIPOPROTEIN (LDL) CHOLESTEROL LESS THAN 70 MG/DL: ICD-10-CM

## 2022-11-02 PROBLEM — J90 PLEURAL EFFUSION: Status: RESOLVED | Noted: 2022-10-16 | Resolved: 2022-11-02

## 2022-11-02 LAB
ALBUMIN SERPL-MCNC: 2.1 G/DL (ref 3.4–5)
ALBUMIN/GLOB SERPL: 0.4 {RATIO} (ref 1–2)
ALP LIVER SERPL-CCNC: 207 U/L
ALT SERPL-CCNC: 95 U/L
ANION GAP SERPL CALC-SCNC: 5 MMOL/L (ref 0–18)
AST SERPL-CCNC: 41 U/L (ref 15–37)
BASOPHILS # BLD AUTO: 0.06 X10(3) UL (ref 0–0.2)
BASOPHILS NFR BLD AUTO: 0.5 %
BILIRUB SERPL-MCNC: 0.8 MG/DL (ref 0.1–2)
BUN BLD-MCNC: 16 MG/DL (ref 7–18)
CALCIUM BLD-MCNC: 9.7 MG/DL (ref 8.5–10.1)
CHLORIDE SERPL-SCNC: 100 MMOL/L (ref 98–112)
CO2 SERPL-SCNC: 29 MMOL/L (ref 21–32)
CREAT BLD-MCNC: 0.59 MG/DL
EOSINOPHIL # BLD AUTO: 0.04 X10(3) UL (ref 0–0.7)
EOSINOPHIL NFR BLD AUTO: 0.3 %
ERYTHROCYTE [DISTWIDTH] IN BLOOD BY AUTOMATED COUNT: 14.8 %
FASTING STATUS PATIENT QL REPORTED: YES
GFR SERPLBLD BASED ON 1.73 SQ M-ARVRAT: 116 ML/MIN/1.73M2 (ref 60–?)
GLOBULIN PLAS-MCNC: 5.9 G/DL (ref 2.8–4.4)
GLUCOSE BLD-MCNC: 121 MG/DL (ref 70–99)
HCT VFR BLD AUTO: 35.5 %
HGB BLD-MCNC: 11 G/DL
IMM GRANULOCYTES # BLD AUTO: 0.27 X10(3) UL (ref 0–1)
IMM GRANULOCYTES NFR BLD: 2.2 %
LYMPHOCYTES # BLD AUTO: 0.67 X10(3) UL (ref 1–4)
LYMPHOCYTES NFR BLD AUTO: 5.5 %
MCH RBC QN AUTO: 29.6 PG (ref 26–34)
MCHC RBC AUTO-ENTMCNC: 31 G/DL (ref 31–37)
MCV RBC AUTO: 95.7 FL
MONOCYTES # BLD AUTO: 1.25 X10(3) UL (ref 0.1–1)
MONOCYTES NFR BLD AUTO: 10.2 %
NEUTROPHILS # BLD AUTO: 9.96 X10 (3) UL (ref 1.5–7.7)
NEUTROPHILS # BLD AUTO: 9.96 X10(3) UL (ref 1.5–7.7)
NEUTROPHILS NFR BLD AUTO: 81.3 %
OSMOLALITY SERPL CALC.SUM OF ELEC: 280 MOSM/KG (ref 275–295)
PLATELET # BLD AUTO: 510 10(3)UL (ref 150–450)
POTASSIUM SERPL-SCNC: 4.5 MMOL/L (ref 3.5–5.1)
PROT SERPL-MCNC: 8 G/DL (ref 6.4–8.2)
RBC # BLD AUTO: 3.71 X10(6)UL
SODIUM SERPL-SCNC: 134 MMOL/L (ref 136–145)
WBC # BLD AUTO: 12.3 X10(3) UL (ref 4–11)

## 2022-11-02 PROCEDURE — 85025 COMPLETE CBC W/AUTO DIFF WBC: CPT | Performed by: FAMILY MEDICINE

## 2022-11-02 PROCEDURE — 80053 COMPREHEN METABOLIC PANEL: CPT | Performed by: FAMILY MEDICINE

## 2022-11-02 RX ORDER — OXYCODONE HCL 5 MG/5 ML
5 SOLUTION, ORAL ORAL EVERY 4 HOURS PRN
Qty: 150 ML | Refills: 0 | Status: SHIPPED | OUTPATIENT
Start: 2022-11-02 | End: 2022-11-04

## 2022-11-02 RX ORDER — ONDANSETRON 4 MG/1
4 TABLET, ORALLY DISINTEGRATING ORAL EVERY 8 HOURS PRN
Qty: 60 TABLET | Refills: 3 | Status: SHIPPED | OUTPATIENT
Start: 2022-11-02

## 2022-11-02 NOTE — TELEPHONE ENCOUNTER
From: Liz Nino. To: Nando Grant MD  Sent: 11/2/2022 2:58 PM CDT  Subject: Niko Stakes     Can you please refill the oxycodone, there is not enough.  (Liquid for J tube)

## 2022-11-04 RX ORDER — OXYCODONE HCL 5 MG/5 ML
5 SOLUTION, ORAL ORAL EVERY 4 HOURS PRN
Qty: 150 ML | Refills: 0 | Status: CANCELLED
Start: 2022-11-04

## 2022-11-04 RX ORDER — OXYCODONE HCL 5 MG/5 ML
5 SOLUTION, ORAL ORAL EVERY 4 HOURS PRN
Qty: 150 ML | Refills: 0 | Status: SHIPPED | OUTPATIENT
Start: 2022-11-04

## 2022-11-04 NOTE — TELEPHONE ENCOUNTER
Pt requesting Rx be routed to [facility] due to availability.    Rx pended for your approval.

## 2022-11-07 ENCOUNTER — TELEPHONE (OUTPATIENT)
Dept: SURGERY | Facility: CLINIC | Age: 52
End: 2022-11-07

## 2022-11-07 DIAGNOSIS — C15.5 MALIGNANT NEOPLASM OF LOWER THIRD OF ESOPHAGUS (HCC): Primary | ICD-10-CM

## 2022-11-07 NOTE — TELEPHONE ENCOUNTER
Spoke to patient he has been doing well. Denies light headedness or dizziness. Denies melena, hematochezia, hematemesis. + productive cough with thick white sputum. Discussed drop in hemoglobin. Will repeat labs tomorrow. He knows to report to the ED should he experience the above. Follow-up Wed.

## 2022-11-07 NOTE — TELEPHONE ENCOUNTER
Called HH and spoke to Jhonyling green and gave verbal orders for a STAT CBC per Juana REAL. Orders faxed to [facility] [phone]. Juana REAL called and spoke to the patient.

## 2022-11-09 ENCOUNTER — OFFICE VISIT (OUTPATIENT)
Dept: SURGERY | Facility: CLINIC | Age: 52
End: 2022-11-09
Payer: COMMERCIAL

## 2022-11-09 ENCOUNTER — APPOINTMENT (OUTPATIENT)
Dept: HEMATOLOGY/ONCOLOGY | Facility: HOSPITAL | Age: 52
End: 2022-11-09
Attending: INTERNAL MEDICINE
Payer: COMMERCIAL

## 2022-11-09 VITALS
BODY MASS INDEX: 31 KG/M2 | WEIGHT: 238 LBS | RESPIRATION RATE: 16 BRPM | DIASTOLIC BLOOD PRESSURE: 96 MMHG | HEART RATE: 104 BPM | TEMPERATURE: 97 F | OXYGEN SATURATION: 96 % | SYSTOLIC BLOOD PRESSURE: 144 MMHG

## 2022-11-09 DIAGNOSIS — C15.5 MALIGNANT NEOPLASM OF LOWER THIRD OF ESOPHAGUS (HCC): Primary | ICD-10-CM

## 2022-11-09 PROCEDURE — 99024 POSTOP FOLLOW-UP VISIT: CPT | Performed by: SURGERY

## 2022-11-09 PROCEDURE — 3080F DIAST BP >= 90 MM HG: CPT | Performed by: SURGERY

## 2022-11-09 PROCEDURE — 3077F SYST BP >= 140 MM HG: CPT | Performed by: SURGERY

## 2022-11-10 ENCOUNTER — MED REC SCAN ONLY (OUTPATIENT)
Dept: FAMILY MEDICINE CLINIC | Facility: CLINIC | Age: 52
End: 2022-11-10

## 2022-11-15 ENCOUNTER — TELEPHONE (OUTPATIENT)
Dept: SURGERY | Facility: CLINIC | Age: 52
End: 2022-11-15

## 2022-11-15 DIAGNOSIS — C15.5 MALIGNANT NEOPLASM OF LOWER THIRD OF ESOPHAGUS (HCC): ICD-10-CM

## 2022-11-15 DIAGNOSIS — K91.89 ESOPHAGEAL ANASTOMOTIC LEAK: Primary | ICD-10-CM

## 2022-11-15 NOTE — TELEPHONE ENCOUNTER
Called patient to check on and patient returned call. Pt stated that his appointment with Dr. Daljit Jain was changed to Friday this week. Pt reported coughing with a lot of mucous production. Pt stated he is having intermittent pain on right side underneath armpit area. Pt not tolerating any food at this time. Informed PA-C regarding patient symptoms. Chest xray and labs ordered for tomorrow and pt scheduled after xray and labs.

## 2022-11-16 ENCOUNTER — HOSPITAL ENCOUNTER (INPATIENT)
Facility: HOSPITAL | Age: 52
LOS: 3 days | Discharge: HOME | End: 2022-11-19
Attending: SURGERY | Admitting: SURGERY
Payer: COMMERCIAL

## 2022-11-16 ENCOUNTER — HOSPITAL ENCOUNTER (OUTPATIENT)
Dept: GENERAL RADIOLOGY | Facility: HOSPITAL | Age: 52
Discharge: HOME | End: 2022-11-16
Attending: SURGERY
Payer: COMMERCIAL

## 2022-11-16 ENCOUNTER — APPOINTMENT (OUTPATIENT)
Dept: CT IMAGING | Facility: HOSPITAL | Age: 52
End: 2022-11-16
Attending: INTERNAL MEDICINE
Payer: COMMERCIAL

## 2022-11-16 ENCOUNTER — HOSPITAL ENCOUNTER (INPATIENT)
Facility: HOSPITAL | Age: 52
LOS: 3 days | Discharge: HOME HEALTH | End: 2022-11-19
Attending: SURGERY | Admitting: SURGERY
Payer: COMMERCIAL

## 2022-11-16 ENCOUNTER — OFFICE VISIT (OUTPATIENT)
Dept: SURGERY | Facility: CLINIC | Age: 52
End: 2022-11-16
Payer: COMMERCIAL

## 2022-11-16 ENCOUNTER — APPOINTMENT (OUTPATIENT)
Dept: HEMATOLOGY/ONCOLOGY | Facility: HOSPITAL | Age: 52
End: 2022-11-16
Attending: INTERNAL MEDICINE
Payer: COMMERCIAL

## 2022-11-16 VITALS
RESPIRATION RATE: 16 BRPM | BODY MASS INDEX: 30 KG/M2 | OXYGEN SATURATION: 96 % | HEART RATE: 112 BPM | DIASTOLIC BLOOD PRESSURE: 100 MMHG | SYSTOLIC BLOOD PRESSURE: 150 MMHG | WEIGHT: 235 LBS | TEMPERATURE: 98 F

## 2022-11-16 DIAGNOSIS — C15.5 MALIGNANT NEOPLASM OF LOWER THIRD OF ESOPHAGUS (HCC): ICD-10-CM

## 2022-11-16 DIAGNOSIS — K91.89 ESOPHAGEAL ANASTOMOTIC LEAK: ICD-10-CM

## 2022-11-16 DIAGNOSIS — K91.89 ESOPHAGEAL ANASTOMOTIC LEAK: Primary | ICD-10-CM

## 2022-11-16 DIAGNOSIS — C15.5 MALIGNANT NEOPLASM OF LOWER THIRD OF ESOPHAGUS (HCC): Primary | ICD-10-CM

## 2022-11-16 LAB
ALBUMIN SERPL-MCNC: 2.2 G/DL (ref 3.4–5)
ALBUMIN/GLOB SERPL: 0.4 {RATIO} (ref 1–2)
ALP LIVER SERPL-CCNC: 146 U/L
ALT SERPL-CCNC: 39 U/L
ANION GAP SERPL CALC-SCNC: 3 MMOL/L (ref 0–18)
AST SERPL-CCNC: 23 U/L (ref 15–37)
BASOPHILS # BLD AUTO: 0.06 X10(3) UL (ref 0–0.2)
BASOPHILS NFR BLD AUTO: 0.5 %
BILIRUB SERPL-MCNC: 0.4 MG/DL (ref 0.1–2)
BUN BLD-MCNC: 18 MG/DL (ref 7–18)
CALCIUM BLD-MCNC: 9.4 MG/DL (ref 8.5–10.1)
CHLORIDE SERPL-SCNC: 103 MMOL/L (ref 98–112)
CO2 SERPL-SCNC: 28 MMOL/L (ref 21–32)
CREAT BLD-MCNC: 0.61 MG/DL
EOSINOPHIL # BLD AUTO: 0.05 X10(3) UL (ref 0–0.7)
EOSINOPHIL NFR BLD AUTO: 0.4 %
ERYTHROCYTE [DISTWIDTH] IN BLOOD BY AUTOMATED COUNT: 15.7 %
FASTING STATUS PATIENT QL REPORTED: NO
GFR SERPLBLD BASED ON 1.73 SQ M-ARVRAT: 115 ML/MIN/1.73M2 (ref 60–?)
GLOBULIN PLAS-MCNC: 5.3 G/DL (ref 2.8–4.4)
GLUCOSE BLD-MCNC: 134 MG/DL (ref 70–99)
GLUCOSE BLD-MCNC: 84 MG/DL (ref 70–99)
HCT VFR BLD AUTO: 34.2 %
HGB BLD-MCNC: 10.5 G/DL
IMM GRANULOCYTES # BLD AUTO: 0.27 X10(3) UL (ref 0–1)
IMM GRANULOCYTES NFR BLD: 2.4 %
LYMPHOCYTES # BLD AUTO: 0.82 X10(3) UL (ref 1–4)
LYMPHOCYTES NFR BLD AUTO: 7.3 %
MCH RBC QN AUTO: 27.9 PG (ref 26–34)
MCHC RBC AUTO-ENTMCNC: 30.7 G/DL (ref 31–37)
MCV RBC AUTO: 90.7 FL
MONOCYTES # BLD AUTO: 0.84 X10(3) UL (ref 0.1–1)
MONOCYTES NFR BLD AUTO: 7.4 %
NEUTROPHILS # BLD AUTO: 9.24 X10 (3) UL (ref 1.5–7.7)
NEUTROPHILS # BLD AUTO: 9.24 X10(3) UL (ref 1.5–7.7)
NEUTROPHILS NFR BLD AUTO: 82 %
OSMOLALITY SERPL CALC.SUM OF ELEC: 282 MOSM/KG (ref 275–295)
PLATELET # BLD AUTO: 420 10(3)UL (ref 150–450)
POTASSIUM SERPL-SCNC: 4.2 MMOL/L (ref 3.5–5.1)
PROCALCITONIN SERPL-MCNC: <0.05 NG/ML (ref ?–0.16)
PROT SERPL-MCNC: 7.5 G/DL (ref 6.4–8.2)
RBC # BLD AUTO: 3.77 X10(6)UL
SODIUM SERPL-SCNC: 134 MMOL/L (ref 136–145)
TRIGL SERPL-MCNC: 86 MG/DL (ref 30–149)
WBC # BLD AUTO: 11.3 X10(3) UL (ref 4–11)

## 2022-11-16 PROCEDURE — 3E0336Z INTRODUCTION OF NUTRITIONAL SUBSTANCE INTO PERIPHERAL VEIN, PERCUTANEOUS APPROACH: ICD-10-PCS | Performed by: SURGERY

## 2022-11-16 PROCEDURE — 3080F DIAST BP >= 90 MM HG: CPT | Performed by: SURGERY

## 2022-11-16 PROCEDURE — 85025 COMPLETE CBC W/AUTO DIFF WBC: CPT | Performed by: SURGERY

## 2022-11-16 PROCEDURE — 71260 CT THORAX DX C+: CPT | Performed by: INTERNAL MEDICINE

## 2022-11-16 PROCEDURE — 99024 POSTOP FOLLOW-UP VISIT: CPT | Performed by: SURGERY

## 2022-11-16 PROCEDURE — 3077F SYST BP >= 140 MM HG: CPT | Performed by: SURGERY

## 2022-11-16 PROCEDURE — 71046 X-RAY EXAM CHEST 2 VIEWS: CPT | Performed by: SURGERY

## 2022-11-16 PROCEDURE — 99255 IP/OBS CONSLTJ NEW/EST HI 80: CPT | Performed by: HOSPITALIST

## 2022-11-16 PROCEDURE — 84478 ASSAY OF TRIGLYCERIDES: CPT | Performed by: SURGERY

## 2022-11-16 PROCEDURE — 80053 COMPREHEN METABOLIC PANEL: CPT | Performed by: SURGERY

## 2022-11-16 RX ORDER — OXYCODONE HCL 5 MG/5 ML
5 SOLUTION, ORAL ORAL EVERY 4 HOURS PRN
Status: DISCONTINUED | OUTPATIENT
Start: 2022-11-16 | End: 2022-11-16

## 2022-11-16 RX ORDER — ACETAMINOPHEN 160 MG/5ML
650 SOLUTION ORAL EVERY 6 HOURS PRN
Status: DISCONTINUED | OUTPATIENT
Start: 2022-11-16 | End: 2022-11-19

## 2022-11-16 RX ORDER — ACETAMINOPHEN 10 MG/ML
1000 INJECTION, SOLUTION INTRAVENOUS EVERY 6 HOURS PRN
Status: DISCONTINUED | OUTPATIENT
Start: 2022-11-16 | End: 2022-11-19

## 2022-11-16 RX ORDER — PANTOPRAZOLE SODIUM 40 MG/1
40 TABLET, DELAYED RELEASE ORAL
Status: DISCONTINUED | OUTPATIENT
Start: 2022-11-16 | End: 2022-11-19

## 2022-11-16 RX ORDER — ONDANSETRON 2 MG/ML
4 INJECTION INTRAMUSCULAR; INTRAVENOUS EVERY 6 HOURS PRN
Status: DISCONTINUED | OUTPATIENT
Start: 2022-11-16 | End: 2022-11-19

## 2022-11-16 RX ORDER — ONDANSETRON 2 MG/ML
8 INJECTION INTRAMUSCULAR; INTRAVENOUS EVERY 8 HOURS PRN
Status: DISCONTINUED | OUTPATIENT
Start: 2022-11-16 | End: 2022-11-19

## 2022-11-16 RX ORDER — METOPROLOL SUCCINATE 50 MG/1
50 TABLET, EXTENDED RELEASE ORAL DAILY
Status: DISCONTINUED | OUTPATIENT
Start: 2022-11-17 | End: 2022-11-19

## 2022-11-16 RX ORDER — GUAIFENESIN 400 MG/1
400 TABLET ORAL
Status: DISCONTINUED | OUTPATIENT
Start: 2022-11-16 | End: 2022-11-19

## 2022-11-16 RX ORDER — OXYCODONE HCL 5 MG/5 ML
5 SOLUTION, ORAL ORAL EVERY 4 HOURS PRN
Status: DISCONTINUED | OUTPATIENT
Start: 2022-11-16 | End: 2022-11-19

## 2022-11-16 RX ORDER — ENOXAPARIN SODIUM 100 MG/ML
40 INJECTION SUBCUTANEOUS DAILY
Status: DISCONTINUED | OUTPATIENT
Start: 2022-11-17 | End: 2022-11-18

## 2022-11-16 RX ORDER — PROCHLORPERAZINE EDISYLATE 5 MG/ML
5 INJECTION INTRAMUSCULAR; INTRAVENOUS EVERY 8 HOURS PRN
Status: DISCONTINUED | OUTPATIENT
Start: 2022-11-16 | End: 2022-11-19

## 2022-11-16 RX ORDER — ONDANSETRON 4 MG/1
8 TABLET, FILM COATED ORAL EVERY 8 HOURS PRN
Status: DISCONTINUED | OUTPATIENT
Start: 2022-11-16 | End: 2022-11-19

## 2022-11-16 RX ORDER — CETIRIZINE HYDROCHLORIDE 5 MG/1
5 TABLET ORAL DAILY
Status: DISCONTINUED | OUTPATIENT
Start: 2022-11-17 | End: 2022-11-19

## 2022-11-16 RX ORDER — SODIUM CHLORIDE 9 MG/ML
INJECTION, SOLUTION INTRAVENOUS CONTINUOUS
Status: DISCONTINUED | OUTPATIENT
Start: 2022-11-16 | End: 2022-11-19

## 2022-11-16 RX ORDER — IOHEXOL 350 MG/ML
75 INJECTION, SOLUTION INTRAVENOUS
Status: COMPLETED | OUTPATIENT
Start: 2022-11-16 | End: 2022-11-16

## 2022-11-17 ENCOUNTER — APPOINTMENT (OUTPATIENT)
Dept: GENERAL RADIOLOGY | Facility: HOSPITAL | Age: 52
End: 2022-11-17
Attending: INTERNAL MEDICINE
Payer: COMMERCIAL

## 2022-11-17 ENCOUNTER — ANESTHESIA (OUTPATIENT)
Dept: ENDOSCOPY | Facility: HOSPITAL | Age: 52
End: 2022-11-17
Payer: COMMERCIAL

## 2022-11-17 ENCOUNTER — ANESTHESIA EVENT (OUTPATIENT)
Dept: ENDOSCOPY | Facility: HOSPITAL | Age: 52
End: 2022-11-17
Payer: COMMERCIAL

## 2022-11-17 LAB
ALBUMIN SERPL-MCNC: 1.9 G/DL (ref 3.4–5)
ALBUMIN/GLOB SERPL: 0.4 {RATIO} (ref 1–2)
ALP LIVER SERPL-CCNC: 115 U/L
ALT SERPL-CCNC: 32 U/L
ANION GAP SERPL CALC-SCNC: 3 MMOL/L (ref 0–18)
AST SERPL-CCNC: 18 U/L (ref 15–37)
BASOPHILS # BLD AUTO: 0.03 X10(3) UL (ref 0–0.2)
BASOPHILS NFR BLD AUTO: 0.3 %
BILIRUB SERPL-MCNC: 0.3 MG/DL (ref 0.1–2)
BUN BLD-MCNC: 17 MG/DL (ref 7–18)
CALCIUM BLD-MCNC: 8.7 MG/DL (ref 8.5–10.1)
CHLORIDE SERPL-SCNC: 102 MMOL/L (ref 98–112)
CO2 SERPL-SCNC: 30 MMOL/L (ref 21–32)
CREAT BLD-MCNC: 0.53 MG/DL
EOSINOPHIL # BLD AUTO: 0.09 X10(3) UL (ref 0–0.7)
EOSINOPHIL NFR BLD AUTO: 1 %
ERYTHROCYTE [DISTWIDTH] IN BLOOD BY AUTOMATED COUNT: 15.9 %
GFR SERPLBLD BASED ON 1.73 SQ M-ARVRAT: 120 ML/MIN/1.73M2 (ref 60–?)
GLOBULIN PLAS-MCNC: 4.3 G/DL (ref 2.8–4.4)
GLUCOSE BLD-MCNC: 146 MG/DL (ref 70–99)
GLUCOSE BLD-MCNC: 169 MG/DL (ref 70–99)
GLUCOSE BLD-MCNC: 172 MG/DL (ref 70–99)
GLUCOSE BLD-MCNC: 174 MG/DL (ref 70–99)
HCT VFR BLD AUTO: 28.1 %
HGB BLD-MCNC: 8.8 G/DL
IMM GRANULOCYTES # BLD AUTO: 0.22 X10(3) UL (ref 0–1)
IMM GRANULOCYTES NFR BLD: 2.4 %
LYMPHOCYTES # BLD AUTO: 0.67 X10(3) UL (ref 1–4)
LYMPHOCYTES NFR BLD AUTO: 7.2 %
MAGNESIUM SERPL-MCNC: 2.1 MG/DL (ref 1.6–2.6)
MCH RBC QN AUTO: 28.6 PG (ref 26–34)
MCHC RBC AUTO-ENTMCNC: 31.3 G/DL (ref 31–37)
MCV RBC AUTO: 91.2 FL
MONOCYTES # BLD AUTO: 0.75 X10(3) UL (ref 0.1–1)
MONOCYTES NFR BLD AUTO: 8.1 %
MRSA DNA SPEC QL NAA+PROBE: NEGATIVE
NEUTROPHILS # BLD AUTO: 7.49 X10 (3) UL (ref 1.5–7.7)
NEUTROPHILS # BLD AUTO: 7.49 X10(3) UL (ref 1.5–7.7)
NEUTROPHILS NFR BLD AUTO: 81 %
OSMOLALITY SERPL CALC.SUM OF ELEC: 286 MOSM/KG (ref 275–295)
PHOSPHATE SERPL-MCNC: 4.3 MG/DL (ref 2.5–4.9)
PLATELET # BLD AUTO: 358 10(3)UL (ref 150–450)
POTASSIUM SERPL-SCNC: 4.1 MMOL/L (ref 3.5–5.1)
PROT SERPL-MCNC: 6.2 G/DL (ref 6.4–8.2)
RBC # BLD AUTO: 3.08 X10(6)UL
SARS-COV-2 RNA RESP QL NAA+PROBE: NOT DETECTED
SODIUM SERPL-SCNC: 135 MMOL/L (ref 136–145)
TRIGL SERPL-MCNC: 98 MG/DL (ref 30–149)
WBC # BLD AUTO: 9.3 X10(3) UL (ref 4–11)

## 2022-11-17 PROCEDURE — 71045 X-RAY EXAM CHEST 1 VIEW: CPT | Performed by: INTERNAL MEDICINE

## 2022-11-17 PROCEDURE — 0DQ58ZZ REPAIR ESOPHAGUS, VIA NATURAL OR ARTIFICIAL OPENING ENDOSCOPIC: ICD-10-PCS | Performed by: INTERNAL MEDICINE

## 2022-11-17 PROCEDURE — 99232 SBSQ HOSP IP/OBS MODERATE 35: CPT | Performed by: HOSPITALIST

## 2022-11-17 PROCEDURE — 0DP58DZ REMOVAL OF INTRALUMINAL DEVICE FROM ESOPHAGUS, VIA NATURAL OR ARTIFICIAL OPENING ENDOSCOPIC: ICD-10-PCS | Performed by: INTERNAL MEDICINE

## 2022-11-17 PROCEDURE — 0BJ08ZZ INSPECTION OF TRACHEOBRONCHIAL TREE, VIA NATURAL OR ARTIFICIAL OPENING ENDOSCOPIC: ICD-10-PCS | Performed by: INTERNAL MEDICINE

## 2022-11-17 PROCEDURE — 0DC58ZZ EXTIRPATION OF MATTER FROM ESOPHAGUS, VIA NATURAL OR ARTIFICIAL OPENING ENDOSCOPIC: ICD-10-PCS | Performed by: INTERNAL MEDICINE

## 2022-11-17 PROCEDURE — 0D758DZ DILATION OF ESOPHAGUS WITH INTRALUMINAL DEVICE, VIA NATURAL OR ARTIFICIAL OPENING ENDOSCOPIC: ICD-10-PCS | Performed by: INTERNAL MEDICINE

## 2022-11-17 PROCEDURE — 99222 1ST HOSP IP/OBS MODERATE 55: CPT | Performed by: INTERNAL MEDICINE

## 2022-11-17 DEVICE — STENT SYSTEM
Type: IMPLANTABLE DEVICE | Site: ESOPHAGUS | Status: FUNCTIONAL
Brand: WALLFLEX™ ESOPHAGEAL

## 2022-11-17 RX ORDER — HYDROMORPHONE HYDROCHLORIDE 1 MG/ML
0.6 INJECTION, SOLUTION INTRAMUSCULAR; INTRAVENOUS; SUBCUTANEOUS EVERY 5 MIN PRN
Status: CANCELLED | OUTPATIENT
Start: 2022-11-17 | End: 2022-11-17

## 2022-11-17 RX ORDER — HYDROMORPHONE HYDROCHLORIDE 1 MG/ML
0.2 INJECTION, SOLUTION INTRAMUSCULAR; INTRAVENOUS; SUBCUTANEOUS EVERY 5 MIN PRN
Status: DISCONTINUED | OUTPATIENT
Start: 2022-11-17 | End: 2022-11-17 | Stop reason: HOSPADM

## 2022-11-17 RX ORDER — NALOXONE HYDROCHLORIDE 0.4 MG/ML
80 INJECTION, SOLUTION INTRAMUSCULAR; INTRAVENOUS; SUBCUTANEOUS AS NEEDED
Status: DISCONTINUED | OUTPATIENT
Start: 2022-11-17 | End: 2022-11-17 | Stop reason: HOSPADM

## 2022-11-17 RX ORDER — ONDANSETRON 2 MG/ML
4 INJECTION INTRAMUSCULAR; INTRAVENOUS EVERY 6 HOURS PRN
Status: DISCONTINUED | OUTPATIENT
Start: 2022-11-17 | End: 2022-11-17 | Stop reason: HOSPADM

## 2022-11-17 RX ORDER — LABETALOL HYDROCHLORIDE 5 MG/ML
INJECTION, SOLUTION INTRAVENOUS AS NEEDED
Status: DISCONTINUED | OUTPATIENT
Start: 2022-11-17 | End: 2022-11-17 | Stop reason: SURG

## 2022-11-17 RX ORDER — SODIUM CHLORIDE, SODIUM LACTATE, POTASSIUM CHLORIDE, CALCIUM CHLORIDE 600; 310; 30; 20 MG/100ML; MG/100ML; MG/100ML; MG/100ML
INJECTION, SOLUTION INTRAVENOUS CONTINUOUS PRN
Status: DISCONTINUED | OUTPATIENT
Start: 2022-11-17 | End: 2022-11-17 | Stop reason: SURG

## 2022-11-17 RX ORDER — IPRATROPIUM BROMIDE AND ALBUTEROL SULFATE 2.5; .5 MG/3ML; MG/3ML
3 SOLUTION RESPIRATORY (INHALATION) ONCE
Status: COMPLETED | OUTPATIENT
Start: 2022-11-17 | End: 2022-11-17

## 2022-11-17 RX ORDER — LIDOCAINE HYDROCHLORIDE 20 MG/ML
INJECTION, SOLUTION EPIDURAL; INFILTRATION; INTRACAUDAL; PERINEURAL
Status: DISCONTINUED | OUTPATIENT
Start: 2022-11-17 | End: 2022-11-17 | Stop reason: HOSPADM

## 2022-11-17 RX ORDER — PROCHLORPERAZINE EDISYLATE 5 MG/ML
5 INJECTION INTRAMUSCULAR; INTRAVENOUS EVERY 8 HOURS PRN
Status: DISCONTINUED | OUTPATIENT
Start: 2022-11-17 | End: 2022-11-17 | Stop reason: HOSPADM

## 2022-11-17 RX ORDER — HYDROMORPHONE HYDROCHLORIDE 1 MG/ML
0.2 INJECTION, SOLUTION INTRAMUSCULAR; INTRAVENOUS; SUBCUTANEOUS EVERY 5 MIN PRN
Status: CANCELLED | OUTPATIENT
Start: 2022-11-17 | End: 2022-11-17

## 2022-11-17 RX ORDER — HYDROMORPHONE HYDROCHLORIDE 1 MG/ML
0.6 INJECTION, SOLUTION INTRAMUSCULAR; INTRAVENOUS; SUBCUTANEOUS EVERY 5 MIN PRN
Status: DISCONTINUED | OUTPATIENT
Start: 2022-11-17 | End: 2022-11-17 | Stop reason: HOSPADM

## 2022-11-17 RX ORDER — LIDOCAINE HYDROCHLORIDE 10 MG/ML
INJECTION, SOLUTION EPIDURAL; INFILTRATION; INTRACAUDAL; PERINEURAL AS NEEDED
Status: DISCONTINUED | OUTPATIENT
Start: 2022-11-17 | End: 2022-11-17 | Stop reason: SURG

## 2022-11-17 RX ORDER — MORPHINE SULFATE 2 MG/ML
2 INJECTION, SOLUTION INTRAMUSCULAR; INTRAVENOUS EVERY 4 HOURS PRN
Status: DISCONTINUED | OUTPATIENT
Start: 2022-11-17 | End: 2022-11-19

## 2022-11-17 RX ORDER — SODIUM CHLORIDE, SODIUM LACTATE, POTASSIUM CHLORIDE, CALCIUM CHLORIDE 600; 310; 30; 20 MG/100ML; MG/100ML; MG/100ML; MG/100ML
INJECTION, SOLUTION INTRAVENOUS CONTINUOUS
Status: DISCONTINUED | OUTPATIENT
Start: 2022-11-17 | End: 2022-11-17 | Stop reason: HOSPADM

## 2022-11-17 RX ORDER — SODIUM CHLORIDE, SODIUM LACTATE, POTASSIUM CHLORIDE, CALCIUM CHLORIDE 600; 310; 30; 20 MG/100ML; MG/100ML; MG/100ML; MG/100ML
INJECTION, SOLUTION INTRAVENOUS CONTINUOUS
Status: CANCELLED | OUTPATIENT
Start: 2022-11-17

## 2022-11-17 RX ORDER — HYDROMORPHONE HYDROCHLORIDE 1 MG/ML
0.4 INJECTION, SOLUTION INTRAMUSCULAR; INTRAVENOUS; SUBCUTANEOUS EVERY 5 MIN PRN
Status: DISCONTINUED | OUTPATIENT
Start: 2022-11-17 | End: 2022-11-17 | Stop reason: HOSPADM

## 2022-11-17 RX ORDER — DEXAMETHASONE SODIUM PHOSPHATE 4 MG/ML
VIAL (ML) INJECTION AS NEEDED
Status: DISCONTINUED | OUTPATIENT
Start: 2022-11-17 | End: 2022-11-17 | Stop reason: SURG

## 2022-11-17 RX ORDER — HYDROMORPHONE HYDROCHLORIDE 1 MG/ML
0.4 INJECTION, SOLUTION INTRAMUSCULAR; INTRAVENOUS; SUBCUTANEOUS EVERY 5 MIN PRN
Status: CANCELLED | OUTPATIENT
Start: 2022-11-17 | End: 2022-11-17

## 2022-11-17 RX ORDER — ALBUTEROL SULFATE 2.5 MG/3ML
SOLUTION RESPIRATORY (INHALATION)
Status: DISCONTINUED
Start: 2022-11-17 | End: 2022-11-17 | Stop reason: WASHOUT

## 2022-11-17 RX ORDER — NALOXONE HYDROCHLORIDE 0.4 MG/ML
80 INJECTION, SOLUTION INTRAMUSCULAR; INTRAVENOUS; SUBCUTANEOUS AS NEEDED
Status: CANCELLED | OUTPATIENT
Start: 2022-11-17 | End: 2022-11-17

## 2022-11-17 RX ORDER — IPRATROPIUM BROMIDE AND ALBUTEROL SULFATE 2.5; .5 MG/3ML; MG/3ML
SOLUTION RESPIRATORY (INHALATION)
Status: COMPLETED
Start: 2022-11-17 | End: 2022-11-17

## 2022-11-17 RX ADMIN — DEXAMETHASONE SODIUM PHOSPHATE 4 MG: 4 MG/ML VIAL (ML) INJECTION at 13:46:00

## 2022-11-17 RX ADMIN — SODIUM CHLORIDE, SODIUM LACTATE, POTASSIUM CHLORIDE, CALCIUM CHLORIDE: 600; 310; 30; 20 INJECTION, SOLUTION INTRAVENOUS at 13:14:00

## 2022-11-17 RX ADMIN — SODIUM CHLORIDE, SODIUM LACTATE, POTASSIUM CHLORIDE, CALCIUM CHLORIDE: 600; 310; 30; 20 INJECTION, SOLUTION INTRAVENOUS at 14:55:00

## 2022-11-17 RX ADMIN — LABETALOL HYDROCHLORIDE 10 MG: 5 INJECTION, SOLUTION INTRAVENOUS at 13:52:00

## 2022-11-17 RX ADMIN — LIDOCAINE HYDROCHLORIDE 50 MG: 10 INJECTION, SOLUTION EPIDURAL; INFILTRATION; INTRACAUDAL; PERINEURAL at 13:17:00

## 2022-11-17 RX ADMIN — ONDANSETRON 4 MG: 2 INJECTION INTRAMUSCULAR; INTRAVENOUS at 13:51:00

## 2022-11-17 NOTE — PHYSICAL THERAPY NOTE
Physical Therapy    Order of PT eval received. Plan for EGD and bronchoscopy today. Will hold until after procedures and as schedule allows.

## 2022-11-17 NOTE — SLP NOTE
Order received as per altered consistency BPA; chart reviewed. Patient being followed by GI and other specialities due to h/o esophagogastrectomy with Jtube placement for esophageal cancer and esophageal anastomotic leak. Patient on TPN. Currently NPO for bronch and EGD today. Defer all diet recommendations to GI as patient does not have an oropharyngeal swallow issue. Please re-consult if/when appropriate.    Shital Marrero MS CCC-SLP/L, pager [phone]  Speech-LanguagePathologist

## 2022-11-17 NOTE — OPERATIVE REPORT
[address] Patient Status:  Hospital Outpatient Surgery    10/15/1970   MRN [id number]     [facility] ENDOSCOPY Attending Isabel Plunkett MD   Hosp Day # 1 PCP Zainab Clark MD     OPERATIVE REPORT:     DATE OF OPERATION: 22     PREOPERATIVE DIAGNOSIS(ES): possible tracheoesophageal or bronchoesophageal fistula     POSTOPERATIVE DIAGNOSIS(ES): normal endobronchial examination     OPERATION(S) PERFORMED: Bronchoscopy with airway inspection      SURGEON: Brigette Parra MD    ANESTHESIA: MAC sedation. Please see separate flow sheet. EQUIPMENT:   Olympus adult videobronchoscope. INDICATION: The patient is a 47year old male with history of esophageal cancer s/p chemoRT and esophagectomy complicated by esophageal leak s/p stent and empyema who had ongoing cough and dyspnea with concern for tracheoesophageal or bronchoesophageal fistula. The procedure is being undertaken for diagnostic purposes. CONSENT:  Risks and benefits were reviewed with the patient in detail regarding the procedure as well as anesthesia prior to the procedure. All questions were answered, and the patient was agreeable. PROCEDURE:  After informed consent was obtained, the patient was brought to the bronchoscopy suite. Time out performed. Patient was placed in a supine position, anesthesia administered, and topical lidocaine given for local anesthesia. First, the videobronchoscope was used and inserted orally through the bite block. The upper airways appeared normal. The vocal cords were insufficiently evaluated due to the level of sedation but appeared normal and approximated well. The bronchoscope was then advanced into the trachea. Evaluation of the trachea and main stem airways demonstrated copious white mucoid secretions throughout the bilateral tracheobronchial tree diffusely. The secretions were easily cleared with suction aspiration via the bronchoscope. Careful evaluation of the bilateral tracheobronchial tree revealed no mucosal abnormalities with normal and patent airway to the subsegmental level bilaterally. There was no evidence of bleeding. The patient tolerated the procedure well without immediate complications.      EBL:  None     IMPRESSION:   Esophageal cancer s/p chemoRT and esophagectomy c/b leak s/p stenting  No evidence of tracheoesophageal or bronchoesophageal fistula     PLAN:  Continue care per general pulmonary, thoracic surgery and Gi services     Lillian Rainey MD

## 2022-11-17 NOTE — PROGRESS NOTES
Attempted to see patient but he was getting his EGD. Chart reviewed. VSS. Per Dr. Gm Vicente note, there was no evidence of tracheoesophageal or bronchoesophageal fistula. Will see patient tomorrow.      Carmelo Singleton PA-C  Thoracic Surgery  Pager: [phone]

## 2022-11-17 NOTE — CM/SW NOTE
10/15 to 10/26/2022 CA of the esophagus--s/p robotic esophagectomy with feeding tube, had anastamosis leak with subsequent stent insertion. LACE score of 68. Discharged to home with [facility] and [facility] for TPN    Direct admit 11/16/2022 for productive cough--CT revealed no leak, ?fistula.

## 2022-11-17 NOTE — PLAN OF CARE
Pt Aox4  Pain    -prn, some relief  RA   NSR  TPN going @ 146  Coughing up sputum    -basin givn, sputum clear and frothy. Pt went down for EGD and Bronchoscopy. IV antibiotics   Oral antibiotics. Pt and family questions answered.

## 2022-11-17 NOTE — PLAN OF CARE
Assumed care of pt around 1900. A/o x4. RA. NSR to ST on tele. C/o 6/10 substernal pain. PRN Oxy through Jtube & PRN Morphine added. Productive cough w/ frothy sputum. TPN infusing. IV Zosyn. ID to fix vancomycin doses today. Voiding per urinal.   Strict NPO after midnight. Plan for bronchoscopy & EGD today. Consent in pt chart. Currently resting in bed w/ call light within reach.

## 2022-11-17 NOTE — INTERVAL H&P NOTE
Pre-op Diagnosis: INPT    The above referenced H&P was reviewed by Eddie Marrero MD on 11/17/2022, the patient was examined and no significant changes have occurred in the patient's condition since the H&P was performed. I discussed with the patient and/or legal representative the potential benefits, risks and side effects of this procedure; the likelihood of the patient achieving goals; and potential problems that might occur during recuperation. I discussed reasonable alternatives to the procedure, including risks, benefits and side effects related to the alternatives and risks related to not receiving this procedure. We will proceed with procedure as planned.

## 2022-11-17 NOTE — PLAN OF CARE
NURSING ADMISSION NOTE      Patient admitted via Cart  Oriented to room. Safety precautions initiated. Bed in low position. Call light in reach.      Received patient approximately 1300  Patient alert and oriented X4  On room air, VSS, ST on tele  Patient c/o pain to midsternum 6-8/10, given PRN oxy via Jtube with relief   Up with standby assist to bathroom, voiding karla urine, no BM   Admission navigator complete with patient and friend at bedside   CT chest complete, see results   Plan for EGD tomorrow   Patient updated on plan of care     Problem: RESPIRATORY - ADULT  Goal: Achieves optimal ventilation and oxygenation  Description: INTERVENTIONS:  - Assess for changes in respiratory status  - Assess for changes in mentation and behavior  - Position to facilitate oxygenation and minimize respiratory effort  - Oxygen supplementation based on oxygen saturation or ABGs  - Provide Smoking Cessation handout, if applicable  - Encourage broncho-pulmonary hygiene including cough, deep breathe, Incentive Spirometry  - Assess the need for suctioning and perform as needed  - Assess and instruct to report SOB or any respiratory difficulty  - Respiratory Therapy support as indicated  - Manage/alleviate anxiety  - Monitor for signs/symptoms of CO2 retention  Outcome: Progressing     Problem: PAIN - ADULT  Goal: Verbalizes/displays adequate comfort level or patient's stated pain goal  Description: INTERVENTIONS:  - Encourage pt to monitor pain and request assistance  - Assess pain using appropriate pain scale  - Administer analgesics based on type and severity of pain and evaluate response  - Implement non-pharmacological measures as appropriate and evaluate response  - Consider cultural and social influences on pain and pain management  - Manage/alleviate anxiety  - Utilize distraction and/or relaxation techniques  - Monitor for opioid side effects  - Notify MD/LIP if interventions unsuccessful or patient reports new pain  - Anticipate increased pain with activity and pre-medicate as appropriate  Outcome: Progressing

## 2022-11-18 ENCOUNTER — APPOINTMENT (OUTPATIENT)
Dept: GENERAL RADIOLOGY | Facility: HOSPITAL | Age: 52
End: 2022-11-18
Attending: INTERNAL MEDICINE
Payer: COMMERCIAL

## 2022-11-18 ENCOUNTER — APPOINTMENT (OUTPATIENT)
Dept: GENERAL RADIOLOGY | Facility: HOSPITAL | Age: 52
End: 2022-11-18
Attending: STUDENT IN AN ORGANIZED HEALTH CARE EDUCATION/TRAINING PROGRAM
Payer: COMMERCIAL

## 2022-11-18 ENCOUNTER — APPOINTMENT (OUTPATIENT)
Dept: HEMATOLOGY/ONCOLOGY | Facility: HOSPITAL | Age: 52
End: 2022-11-18
Attending: INTERNAL MEDICINE
Payer: COMMERCIAL

## 2022-11-18 LAB
ALBUMIN SERPL-MCNC: 2.1 G/DL (ref 3.4–5)
ALBUMIN/GLOB SERPL: 0.5 {RATIO} (ref 1–2)
ALP LIVER SERPL-CCNC: 121 U/L
ALT SERPL-CCNC: 30 U/L
ANION GAP SERPL CALC-SCNC: 7 MMOL/L (ref 0–18)
AST SERPL-CCNC: 13 U/L (ref 15–37)
BILIRUB SERPL-MCNC: 0.3 MG/DL (ref 0.1–2)
BUN BLD-MCNC: 17 MG/DL (ref 7–18)
C DIFF TOX B STL QL: NEGATIVE
CALCIUM BLD-MCNC: 8.5 MG/DL (ref 8.5–10.1)
CHLORIDE SERPL-SCNC: 102 MMOL/L (ref 98–112)
CO2 SERPL-SCNC: 27 MMOL/L (ref 21–32)
CREAT BLD-MCNC: 0.55 MG/DL
GFR SERPLBLD BASED ON 1.73 SQ M-ARVRAT: 119 ML/MIN/1.73M2 (ref 60–?)
GLOBULIN PLAS-MCNC: 4.5 G/DL (ref 2.8–4.4)
GLUCOSE BLD-MCNC: 103 MG/DL (ref 70–99)
GLUCOSE BLD-MCNC: 107 MG/DL (ref 70–99)
GLUCOSE BLD-MCNC: 123 MG/DL (ref 70–99)
GLUCOSE BLD-MCNC: 129 MG/DL (ref 70–99)
GLUCOSE BLD-MCNC: 161 MG/DL (ref 70–99)
GLUCOSE BLD-MCNC: 244 MG/DL (ref 70–99)
MAGNESIUM SERPL-MCNC: 1.9 MG/DL (ref 1.6–2.6)
OSMOLALITY SERPL CALC.SUM OF ELEC: 285 MOSM/KG (ref 275–295)
PHOSPHATE SERPL-MCNC: 3.3 MG/DL (ref 2.5–4.9)
POTASSIUM SERPL-SCNC: 4.3 MMOL/L (ref 3.5–5.1)
PROT SERPL-MCNC: 6.6 G/DL (ref 6.4–8.2)
SODIUM SERPL-SCNC: 136 MMOL/L (ref 136–145)

## 2022-11-18 PROCEDURE — 74230 X-RAY XM SWLNG FUNCJ C+: CPT | Performed by: STUDENT IN AN ORGANIZED HEALTH CARE EDUCATION/TRAINING PROGRAM

## 2022-11-18 PROCEDURE — 74246 X-RAY XM UPR GI TRC 2CNTRST: CPT | Performed by: INTERNAL MEDICINE

## 2022-11-18 PROCEDURE — 99232 SBSQ HOSP IP/OBS MODERATE 35: CPT | Performed by: HOSPITALIST

## 2022-11-18 PROCEDURE — 74240 X-RAY XM UPR GI TRC 1CNTRST: CPT | Performed by: INTERNAL MEDICINE

## 2022-11-18 RX ORDER — ENOXAPARIN SODIUM 100 MG/ML
40 INJECTION SUBCUTANEOUS DAILY
Status: DISCONTINUED | OUTPATIENT
Start: 2022-11-18 | End: 2022-11-19

## 2022-11-18 RX ORDER — SODIUM BICARBONATE 325 MG/1
325 TABLET ORAL AS NEEDED
Status: DISCONTINUED | OUTPATIENT
Start: 2022-11-18 | End: 2022-11-19

## 2022-11-18 NOTE — SLP NOTE
Stat Order received for swallow evaluation due to immediate cough noted after thin liquid intake. Patient s/p EGD yesterday with leak closure/suturing,stent placement, and removal of foreign body. Patient also underwent bronchoscopy with airway inspection yesterday with normal endobronchial exam, no TE fistula noted. Noted patient to go for upper GI test today. Given current issues, will defer clinical swallow evaluation and proceed directly to Video-Fluoroscopic Swallow Study to objectively assess oropharyngeal swallow function which can be done in conjunction with upper GI. D/W RN and patient. All expressed understanding and agreement. Recommend NPO and use enteral means for nutrition/hydration/medication at this time. Additional recommendations pending results of tests later today.   Jeramy Del Rio, MS GUPTA-SLP/L, pager [phone]  Speech-LanguagePathologist

## 2022-11-18 NOTE — PROGRESS NOTES
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 Unable to draw morning labs as PICC would not draw blood.  Started new IV in Right Hand x 1 attempt still slow blood draw. Called lab for them to draw AM labs. Patient Pt here for C1D15 Taxol/Carboplatin.   Arrives Ambulating independently, accompanied by Self           Pregnancy screening: Not applicable    Modifications in dose or schedule: No    Drugs/infusions dual verified for appearance and physical integrity: yes     Frequency of blood return and site check throughout administration: Prior to administration, Every 2-3 ml IVP and At completion of therapy   Discharged to Home, Ambulating independently, accompanied by:Self    Outpatient Oncology Care Plan  Problem list:  fatigue  knowledge deficit  Problems related to:  disease/disease progression  side effect of treatment  Interventions:  maintain safe environment  encourage activity as tolerated  promoted rest  provided general teaching  Expected outcomes:  adequate sleep/rest  safe in environment  symptoms relieved/minimized  understands plan of care  Progress towards outcome:  making progress    Education Record    Learner:  Patient  Barriers / Limitations:  None  Method:  Brief focused and Reinforcement  Outcome:  Shows understanding  Comments:

## 2022-11-18 NOTE — PLAN OF CARE
Assumed care of pt around 1900. A/o x4. RA. NSR to ST on tele. Productive cough w/ moderate frothy/thick sputum. PRN Robitussin given w/ some relief. C/o 6/10 substernal pain. PRN Oxy given for pain management. CXR w/ improvement. TPN infusing as ordered. IV zosyn & Vanco.  Voiding per urinal.   Plan for possible DC today.

## 2022-11-18 NOTE — PLAN OF CARE
Assumed care at 0730. A&OX4. Tele - SR/ST. Continues to cough and spit up white frothy phlegm. Unable to keep liquids down. Pain Substernal Chest intermittent. Medicated with Liquid Roxicodone with adequate pain relief. Video Swallow completed today. UGI completed today. OK for thin liquids. Encouraged to only take tiny sips of fluids for now. TF initiated via J-Tube. To remain at 10cc/hour tonight to see if he is able to tolerate. Wife at bedside. Spoke with Dr. Rae Alaniz to understand POC. Per Dr. Rae Alaniz, Pt will need to go home on TPN and J-Tube feedings until pt is at goal with TF. Resting comfortably. Will continue to monitor.

## 2022-11-18 NOTE — CM/SW NOTE
Pt discussed in rounds this AM. Pt current with [facility] and [facility] for TPN, IV Abx since last admission. TPN started last admission due to non tolerance of Tube feed due to nausea. Would like to trial tube feeds again. SW needs to be made aware if pt will be dc with TPN or Tube Feeds. TPN takes about 6 hours to make formula. Tube Feeds will need new orders and insurance checked again prior to dc. SW needs to be made aware if dc again on IV ABX and if orders changed. Would like to avoid a delay in discharge if possible.      BENY Spring  Discharge 2011 [facility]

## 2022-11-19 VITALS
SYSTOLIC BLOOD PRESSURE: 154 MMHG | HEART RATE: 91 BPM | HEIGHT: 74 IN | RESPIRATION RATE: 15 BRPM | BODY MASS INDEX: 30.16 KG/M2 | OXYGEN SATURATION: 93 % | DIASTOLIC BLOOD PRESSURE: 95 MMHG | WEIGHT: 235 LBS | TEMPERATURE: 98 F

## 2022-11-19 LAB
ALBUMIN SERPL-MCNC: 2.3 G/DL (ref 3.4–5)
ALBUMIN/GLOB SERPL: 0.5 {RATIO} (ref 1–2)
ALP LIVER SERPL-CCNC: 122 U/L
ALT SERPL-CCNC: 38 U/L
ANION GAP SERPL CALC-SCNC: 5 MMOL/L (ref 0–18)
AST SERPL-CCNC: 21 U/L (ref 15–37)
BILIRUB SERPL-MCNC: 0.3 MG/DL (ref 0.1–2)
BUN BLD-MCNC: 16 MG/DL (ref 7–18)
CALCIUM BLD-MCNC: 9 MG/DL (ref 8.5–10.1)
CHLORIDE SERPL-SCNC: 104 MMOL/L (ref 98–112)
CO2 SERPL-SCNC: 28 MMOL/L (ref 21–32)
CREAT BLD-MCNC: 0.58 MG/DL
GFR SERPLBLD BASED ON 1.73 SQ M-ARVRAT: 117 ML/MIN/1.73M2 (ref 60–?)
GLOBULIN PLAS-MCNC: 4.3 G/DL (ref 2.8–4.4)
GLUCOSE BLD-MCNC: 109 MG/DL (ref 70–99)
GLUCOSE BLD-MCNC: 145 MG/DL (ref 70–99)
GLUCOSE BLD-MCNC: 146 MG/DL (ref 70–99)
MAGNESIUM SERPL-MCNC: 2.3 MG/DL (ref 1.6–2.6)
OSMOLALITY SERPL CALC.SUM OF ELEC: 288 MOSM/KG (ref 275–295)
PHOSPHATE SERPL-MCNC: 4 MG/DL (ref 2.5–4.9)
POTASSIUM SERPL-SCNC: 4.5 MMOL/L (ref 3.5–5.1)
PROT SERPL-MCNC: 6.6 G/DL (ref 6.4–8.2)
SODIUM SERPL-SCNC: 137 MMOL/L (ref 136–145)

## 2022-11-19 PROCEDURE — 99232 SBSQ HOSP IP/OBS MODERATE 35: CPT | Performed by: HOSPITALIST

## 2022-11-19 RX ORDER — AMOXICILLIN AND CLAVULANATE POTASSIUM 875; 125 MG/1; MG/1
875 TABLET, FILM COATED ORAL EVERY 12 HOURS SCHEDULED
Qty: 4 TABLET | Refills: 0 | Status: SHIPPED | OUTPATIENT
Start: 2022-11-20 | End: 2022-11-23

## 2022-11-19 RX ORDER — AMOXICILLIN AND CLAVULANATE POTASSIUM 875; 125 MG/1; MG/1
875 TABLET, FILM COATED ORAL EVERY 12 HOURS SCHEDULED
Status: DISCONTINUED | OUTPATIENT
Start: 2022-11-20 | End: 2022-11-19

## 2022-11-19 NOTE — PLAN OF CARE
Assumed patient care at [address]  Patient alert and orientated x 4; cooperative with care  Reviewed plan of care and answered all questions  VSS. HR in high 90s to low 100s overnight. NSR/ST on the monitor  Robitussin administered for secretions and cough  Pain fairly controlled with Roxicodone administered through J Tube  TPN infused without incident and as ordered  J tube feeding remain at 10ml/hr.  Await MD round to advance per dayshift RN  Episode of diarrhea overnight  All safety precautions maintained  Will continue to monitor

## 2022-11-19 NOTE — DISCHARGE INSTRUCTIONS
[facility] (Infusion)  Phone: [phone]  Fax: [phone]    Sometimes managing your health at home requires assistance. The [place] team has recognized your preference to use [facility]. They can be reached by phone at [phone]. The fax number for your reference is [phone]. A representative from the home health agency will contact you or your family to schedule your first visit.

## 2022-11-19 NOTE — CM/SW NOTE
Uploaded progress notes, MAR, labs, and current TPN order to [facility] in aidin. SW/CM will continue to follow. 1pm: Spoke with RN who reports patient is medically cleared for discharge today. He will go on oral abx and resume TPN. Left message with Alessandro Guerra from [facility]. Spoke with [facility] answering service and left message. SW will await return call. 1:45pm: Attempted to contact [facility] twice with multiple rings and no answer. 1:55pm: Spoke with patient who confirmed he has 2 bags of TPN available and supplies at home. He confirmed he uses 1 bag a day. Left message with patient's contact Mckenzie Bedoya from [facility] [phone].     2:20pm: Fellow SW able to get in contact with [facility] who confirmed they have TPN ready to deliver to patient this evening. Spoke with patient to provide update. Will send AVS to [facility] once available.     BENY You  Discharge Planner  [phone]

## 2022-11-19 NOTE — PROGRESS NOTES
NURSING DISCHARGE NOTE    Assumed care of patient at 07:30 am  Patient medically cleared to discharge per all consults. Discharge AVS completed. Discharge AVS given, instructions, Rx's, and follow-up appts. Reviewed with pt, all questions answered, pt v/u. S.work arranged [facility] and [facility]. Pt discharged home via wife. Discharged Home via Wheelchair. Accompanied by Spouse and Support staff  Belongings Taken by patient/family.

## 2022-11-19 NOTE — CM/SW NOTE
MARIZA contacted [address] [place] [address] Dr Suarez ([phone]) and spoke with the Nutritional Support department. MARIZA informed [facility] of pt's discharge today and to resume TPN. [address] [place] [address] Dr Suarez stated they have pt's TPN bags made and ready for delivery today. [address] [place] [address] Dr Suarez stated they will reach out to pt to schedule delivery of TPN tonight.  Updated pt's assigned [address] [facility]  Discharge Planner

## 2022-11-22 ENCOUNTER — TELEPHONE (OUTPATIENT)
Dept: SURGERY | Facility: CLINIC | Age: 52
End: 2022-11-22

## 2022-11-22 NOTE — TELEPHONE ENCOUNTER
Patient was called to see how he is doing post discharge. Patient state that he is doing okay but reports vomiting in the evening after his TPN has been running for 6-7 hours. Pt wife reports patient has been drinking danimals smoothies and orange koolade. She has also made him a fruit smoothie with almond milk. Pt did vomit that up this morning. Pt was instructed to have nothing by mouth at this time. The Dietician has switch his TPN to during the day when he is sitting up. Today is the first day of trying this. Patient has not tried any tube feedings. I told the wife that I will speak to [facility] and call them back around 3pm to re evaluate. Pt called back and he reports that he did vomit 20 minutes ago and it was a small amount. Pt also reports having some acid reflux. [facility] PA notified patient will continue NPO with only small sips of water to take medication. Pt will continue TPN during the day. Pt instructed to make sure he is sleeping at a 30 degree angle. HH will add famotidine to his TPN. Patient verbalized instructions. Patient was given a follow up appointment with Dr. Lucy Poe and Dr. Rosales Reese on 11/30 at 2pm. I also told the patient I will call him tomorrow for re-evaluation.  Pt verbalized understanding

## 2022-11-23 ENCOUNTER — TELEPHONE (OUTPATIENT)
Dept: SURGERY | Facility: CLINIC | Age: 52
End: 2022-11-23

## 2022-11-23 NOTE — PAYOR COMM NOTE
Discharge Notification    Patient Name: Roxie Dandy  Payor: Josh BALLARD  Subscriber #: [id number]  Authorization Number: [id number]  Admit Date/Time: 11/16/2022 12:07 PM  Discharge Date/Time: 11/19/2022 5:26 PM

## 2022-11-23 NOTE — TELEPHONE ENCOUNTER
Patient was called today for update pt reports still vomiting. Pt reports the vomiting is the same as it was in the hospital. He reports no change. Pt has a call out to GI and waiting for lady to call back with an appointment. Per Jorge Loco continue TPN and clear liquids only. Pt given instructions and we will see him on Wednesday. Pt verbalized understanding.

## 2022-11-25 ENCOUNTER — HOSPITAL ENCOUNTER (OUTPATIENT)
Dept: GENERAL RADIOLOGY | Facility: HOSPITAL | Age: 52
Discharge: HOME | End: 2022-11-25
Attending: INTERNAL MEDICINE
Payer: COMMERCIAL

## 2022-11-25 ENCOUNTER — HOSPITAL ENCOUNTER (OUTPATIENT)
Facility: HOSPITAL | Age: 52
Setting detail: HOSPITAL OUTPATIENT SURGERY
Discharge: HOME | End: 2022-11-25
Attending: INTERNAL MEDICINE | Admitting: INTERNAL MEDICINE
Payer: COMMERCIAL

## 2022-11-25 ENCOUNTER — ANESTHESIA (OUTPATIENT)
Dept: ENDOSCOPY | Facility: HOSPITAL | Age: 52
End: 2022-11-25
Payer: COMMERCIAL

## 2022-11-25 ENCOUNTER — ANESTHESIA EVENT (OUTPATIENT)
Dept: ENDOSCOPY | Facility: HOSPITAL | Age: 52
End: 2022-11-25
Payer: COMMERCIAL

## 2022-11-25 VITALS
HEIGHT: 74 IN | WEIGHT: 230 LBS | TEMPERATURE: 98 F | DIASTOLIC BLOOD PRESSURE: 94 MMHG | BODY MASS INDEX: 29.52 KG/M2 | OXYGEN SATURATION: 98 % | SYSTOLIC BLOOD PRESSURE: 144 MMHG | HEART RATE: 91 BPM | RESPIRATION RATE: 18 BRPM

## 2022-11-25 DIAGNOSIS — K22.2 STRICTURE ESOPHAGUS: ICD-10-CM

## 2022-11-25 PROCEDURE — 74360 X-RAY GUIDE GI DILATION: CPT | Performed by: INTERNAL MEDICINE

## 2022-11-25 PROCEDURE — 0D738DZ DILATION OF LOWER ESOPHAGUS WITH INTRALUMINAL DEVICE, VIA NATURAL OR ARTIFICIAL OPENING ENDOSCOPIC: ICD-10-PCS | Performed by: INTERNAL MEDICINE

## 2022-11-25 PROCEDURE — 3E0G8GC INTRODUCTION OF OTHER THERAPEUTIC SUBSTANCE INTO UPPER GI, VIA NATURAL OR ARTIFICIAL OPENING ENDOSCOPIC: ICD-10-PCS | Performed by: INTERNAL MEDICINE

## 2022-11-25 DEVICE — STENT SYSTEM
Type: IMPLANTABLE DEVICE | Site: ESOPHAGUS | Status: FUNCTIONAL
Brand: WALLFLEX™ ESOPHAGEAL

## 2022-11-25 RX ORDER — HYDROMORPHONE HYDROCHLORIDE 1 MG/ML
0.4 INJECTION, SOLUTION INTRAMUSCULAR; INTRAVENOUS; SUBCUTANEOUS EVERY 5 MIN PRN
Status: DISCONTINUED | OUTPATIENT
Start: 2022-11-25 | End: 2022-11-25

## 2022-11-25 RX ORDER — NALOXONE HYDROCHLORIDE 0.4 MG/ML
80 INJECTION, SOLUTION INTRAMUSCULAR; INTRAVENOUS; SUBCUTANEOUS AS NEEDED
Status: DISCONTINUED | OUTPATIENT
Start: 2022-11-25 | End: 2022-11-25

## 2022-11-25 RX ORDER — SODIUM CHLORIDE, SODIUM LACTATE, POTASSIUM CHLORIDE, CALCIUM CHLORIDE 600; 310; 30; 20 MG/100ML; MG/100ML; MG/100ML; MG/100ML
INJECTION, SOLUTION INTRAVENOUS CONTINUOUS
Status: DISCONTINUED | OUTPATIENT
Start: 2022-11-25 | End: 2022-11-25

## 2022-11-25 RX ORDER — HYDROMORPHONE HYDROCHLORIDE 1 MG/ML
0.2 INJECTION, SOLUTION INTRAMUSCULAR; INTRAVENOUS; SUBCUTANEOUS EVERY 5 MIN PRN
Status: DISCONTINUED | OUTPATIENT
Start: 2022-11-25 | End: 2022-11-25

## 2022-11-25 RX ORDER — HYDROMORPHONE HYDROCHLORIDE 1 MG/ML
0.6 INJECTION, SOLUTION INTRAMUSCULAR; INTRAVENOUS; SUBCUTANEOUS EVERY 5 MIN PRN
Status: DISCONTINUED | OUTPATIENT
Start: 2022-11-25 | End: 2022-11-25

## 2022-11-25 RX ORDER — LIDOCAINE HYDROCHLORIDE 10 MG/ML
INJECTION, SOLUTION EPIDURAL; INFILTRATION; INTRACAUDAL; PERINEURAL AS NEEDED
Status: DISCONTINUED | OUTPATIENT
Start: 2022-11-25 | End: 2022-11-25 | Stop reason: SURG

## 2022-11-25 RX ADMIN — LIDOCAINE HYDROCHLORIDE 25 MG: 10 INJECTION, SOLUTION EPIDURAL; INFILTRATION; INTRACAUDAL; PERINEURAL at 11:22:00

## 2022-11-25 RX ADMIN — SODIUM CHLORIDE, SODIUM LACTATE, POTASSIUM CHLORIDE, CALCIUM CHLORIDE: 600; 310; 30; 20 INJECTION, SOLUTION INTRAVENOUS at 11:55:00

## 2022-11-25 NOTE — DISCHARGE INSTRUCTIONS
Home Care Instructions for Gastroscopy with Sedation    Diet:  - Resume your regular diet as tolerated unless otherwise instructed. - Start with light meals to minimize bloating.  - Do not drink alcohol today. Medication:  - If you have questions about resuming your normal medications, please contact your Primary Care Physician. Activities:  - Take it easy today. Do not return to work today. - Do not drive today. - Do not operate any machinery today (including kitchen equipment). Gastroscopy:  - You may have a sore throat for 2-3 days following the exam. This is normal. Gargling with warm salt water (1/2 tsp salt to 1 glass warm water) or using throat lozenges will help. - If you experience any sharp pain in your neck, abdomen or chest, vomiting of blood, oral temperature over 100 degrees Fahrenheit, light-headedness or dizziness, or any other problems, contact your doctor. **If unable to reach your doctor, please go to the [facility] Emergency Room**    - Your referring physician will receive a full report of your examination.  - If you do not hear from your doctor's office within two weeks of your biopsy, please call them for your results.

## 2022-11-30 ENCOUNTER — OFFICE VISIT (OUTPATIENT)
Dept: SURGERY | Facility: CLINIC | Age: 52
End: 2022-11-30
Payer: COMMERCIAL

## 2022-11-30 ENCOUNTER — OFFICE VISIT (OUTPATIENT)
Dept: HEMATOLOGY/ONCOLOGY | Facility: HOSPITAL | Age: 52
End: 2022-11-30
Attending: INTERNAL MEDICINE
Payer: COMMERCIAL

## 2022-11-30 VITALS
DIASTOLIC BLOOD PRESSURE: 97 MMHG | RESPIRATION RATE: 18 BRPM | WEIGHT: 235 LBS | OXYGEN SATURATION: 98 % | SYSTOLIC BLOOD PRESSURE: 144 MMHG | TEMPERATURE: 98 F | BODY MASS INDEX: 30 KG/M2 | HEART RATE: 95 BPM

## 2022-11-30 VITALS
TEMPERATURE: 98 F | HEART RATE: 85 BPM | WEIGHT: 230 LBS | RESPIRATION RATE: 16 BRPM | DIASTOLIC BLOOD PRESSURE: 105 MMHG | SYSTOLIC BLOOD PRESSURE: 159 MMHG | BODY MASS INDEX: 30 KG/M2 | OXYGEN SATURATION: 98 %

## 2022-11-30 DIAGNOSIS — Z95.1 S/P CABG X 4: ICD-10-CM

## 2022-11-30 DIAGNOSIS — C15.5 MALIGNANT NEOPLASM OF LOWER THIRD OF ESOPHAGUS (HCC): Primary | ICD-10-CM

## 2022-11-30 DIAGNOSIS — E66.01 MORBID OBESITY WITH BMI OF 40.0-44.9, ADULT (HCC): ICD-10-CM

## 2022-11-30 DIAGNOSIS — I10 ESSENTIAL HYPERTENSION: ICD-10-CM

## 2022-11-30 DIAGNOSIS — K91.89 ESOPHAGEAL ANASTOMOTIC LEAK: ICD-10-CM

## 2022-11-30 DIAGNOSIS — J93.12 SECONDARY SPONTANEOUS PNEUMOTHORAX: ICD-10-CM

## 2022-11-30 DIAGNOSIS — J86.9 EMPYEMA (HCC): ICD-10-CM

## 2022-11-30 DIAGNOSIS — R05.1 ACUTE COUGH: ICD-10-CM

## 2022-11-30 PROCEDURE — 99024 POSTOP FOLLOW-UP VISIT: CPT | Performed by: SURGERY

## 2022-11-30 PROCEDURE — 3077F SYST BP >= 140 MM HG: CPT | Performed by: SURGERY

## 2022-11-30 PROCEDURE — 3080F DIAST BP >= 90 MM HG: CPT | Performed by: SURGERY

## 2022-11-30 PROCEDURE — 99211 OFF/OP EST MAY X REQ PHY/QHP: CPT

## 2022-11-30 RX ORDER — CHOLESTYRAMINE 4 G/9G
4 POWDER, FOR SUSPENSION ORAL 2 TIMES DAILY
Qty: 30 PACKET | Refills: 1 | Status: SHIPPED | OUTPATIENT
Start: 2022-11-30 | End: 2022-12-15

## 2022-11-30 RX ORDER — ONDANSETRON 4 MG/1
4 TABLET, ORALLY DISINTEGRATING ORAL EVERY 8 HOURS PRN
Qty: 60 TABLET | Refills: 0 | Status: SHIPPED | OUTPATIENT
Start: 2022-11-30

## 2022-11-30 RX ORDER — BENZONATATE 100 MG/1
100 CAPSULE ORAL 3 TIMES DAILY PRN
Qty: 30 CAPSULE | Refills: 0 | Status: SHIPPED | OUTPATIENT
Start: 2022-11-30

## 2022-12-02 ENCOUNTER — TELEPHONE (OUTPATIENT)
Dept: SURGERY | Facility: CLINIC | Age: 52
End: 2022-12-02

## 2022-12-02 NOTE — TELEPHONE ENCOUNTER
Called to check on patient. Okay for post stent soft diet. Discussed in detail with patient. Reviewed food options.

## 2022-12-07 DIAGNOSIS — C15.5 MALIGNANT NEOPLASM OF LOWER THIRD OF ESOPHAGUS (HCC): ICD-10-CM

## 2022-12-07 RX ORDER — CHOLESTYRAMINE 4 G/9G
4 POWDER, FOR SUSPENSION ORAL 2 TIMES DAILY
Qty: 30 PACKET | Refills: 1 | Status: ON HOLD | OUTPATIENT
Start: 2022-12-07 | End: 2022-12-12

## 2022-12-08 ENCOUNTER — MED REC SCAN ONLY (OUTPATIENT)
Dept: FAMILY MEDICINE CLINIC | Facility: CLINIC | Age: 52
End: 2022-12-08

## 2022-12-12 ENCOUNTER — APPOINTMENT (OUTPATIENT)
Dept: GENERAL RADIOLOGY | Facility: HOSPITAL | Age: 52
End: 2022-12-12
Attending: EMERGENCY MEDICINE
Payer: COMMERCIAL

## 2022-12-12 ENCOUNTER — HOSPITAL ENCOUNTER (INPATIENT)
Facility: HOSPITAL | Age: 52
LOS: 2 days | Discharge: HOME | DRG: 393 | End: 2022-12-14
Attending: EMERGENCY MEDICINE | Admitting: STUDENT IN AN ORGANIZED HEALTH CARE EDUCATION/TRAINING PROGRAM
Payer: COMMERCIAL

## 2022-12-12 ENCOUNTER — APPOINTMENT (OUTPATIENT)
Dept: CT IMAGING | Facility: HOSPITAL | Age: 52
DRG: 393 | End: 2022-12-12
Attending: EMERGENCY MEDICINE
Payer: COMMERCIAL

## 2022-12-12 ENCOUNTER — TELEPHONE (OUTPATIENT)
Dept: SURGERY | Facility: CLINIC | Age: 52
End: 2022-12-12

## 2022-12-12 ENCOUNTER — APPOINTMENT (OUTPATIENT)
Dept: GENERAL RADIOLOGY | Facility: HOSPITAL | Age: 52
DRG: 393 | End: 2022-12-12
Attending: EMERGENCY MEDICINE
Payer: COMMERCIAL

## 2022-12-12 ENCOUNTER — APPOINTMENT (OUTPATIENT)
Dept: CT IMAGING | Facility: HOSPITAL | Age: 52
End: 2022-12-12
Attending: EMERGENCY MEDICINE
Payer: COMMERCIAL

## 2022-12-12 ENCOUNTER — HOSPITAL ENCOUNTER (INPATIENT)
Facility: HOSPITAL | Age: 52
LOS: 2 days | Discharge: HOME | End: 2022-12-14
Attending: EMERGENCY MEDICINE | Admitting: STUDENT IN AN ORGANIZED HEALTH CARE EDUCATION/TRAINING PROGRAM
Payer: COMMERCIAL

## 2022-12-12 DIAGNOSIS — K94.33: ICD-10-CM

## 2022-12-12 DIAGNOSIS — R04.2 COUGH WITH HEMOPTYSIS: ICD-10-CM

## 2022-12-12 DIAGNOSIS — K91.89 ESOPHAGEAL ANASTOMOTIC LEAK: Primary | ICD-10-CM

## 2022-12-12 LAB
ALBUMIN SERPL-MCNC: 2.6 G/DL (ref 3.4–5)
ALBUMIN/GLOB SERPL: 0.5 {RATIO} (ref 1–2)
ALP LIVER SERPL-CCNC: 131 U/L
ALT SERPL-CCNC: 22 U/L
ANION GAP SERPL CALC-SCNC: 3 MMOL/L (ref 0–18)
ANTIBODY SCREEN: NEGATIVE
AST SERPL-CCNC: 13 U/L (ref 15–37)
ATRIAL RATE: 94 BPM
BASOPHILS # BLD AUTO: 0.03 X10(3) UL (ref 0–0.2)
BASOPHILS NFR BLD AUTO: 0.3 %
BILIRUB SERPL-MCNC: 0.4 MG/DL (ref 0.1–2)
BILIRUB UR QL STRIP.AUTO: NEGATIVE
BUN BLD-MCNC: 19 MG/DL (ref 7–18)
CALCIUM BLD-MCNC: 9.8 MG/DL (ref 8.5–10.1)
CHLORIDE SERPL-SCNC: 105 MMOL/L (ref 98–112)
CLARITY UR REFRACT.AUTO: CLEAR
CO2 SERPL-SCNC: 28 MMOL/L (ref 21–32)
COLOR UR AUTO: YELLOW
CREAT BLD-MCNC: 0.58 MG/DL
EOSINOPHIL # BLD AUTO: 0.02 X10(3) UL (ref 0–0.7)
EOSINOPHIL NFR BLD AUTO: 0.2 %
ERYTHROCYTE [DISTWIDTH] IN BLOOD BY AUTOMATED COUNT: 17.2 %
GFR SERPLBLD BASED ON 1.73 SQ M-ARVRAT: 117 ML/MIN/1.73M2 (ref 60–?)
GLOBULIN PLAS-MCNC: 4.9 G/DL (ref 2.8–4.4)
GLUCOSE BLD-MCNC: 107 MG/DL (ref 70–99)
GLUCOSE BLD-MCNC: 143 MG/DL (ref 70–99)
GLUCOSE UR STRIP.AUTO-MCNC: NEGATIVE MG/DL
HCT VFR BLD AUTO: 36.4 %
HGB BLD-MCNC: 11.7 G/DL
IMM GRANULOCYTES # BLD AUTO: 0.22 X10(3) UL (ref 0–1)
IMM GRANULOCYTES NFR BLD: 2 %
KETONES UR STRIP.AUTO-MCNC: NEGATIVE MG/DL
LEUKOCYTE ESTERASE UR QL STRIP.AUTO: NEGATIVE
LYMPHOCYTES # BLD AUTO: 1 X10(3) UL (ref 1–4)
LYMPHOCYTES NFR BLD AUTO: 8.9 %
MCH RBC QN AUTO: 27.9 PG (ref 26–34)
MCHC RBC AUTO-ENTMCNC: 32.1 G/DL (ref 31–37)
MCV RBC AUTO: 86.9 FL
MONOCYTES # BLD AUTO: 0.88 X10(3) UL (ref 0.1–1)
MONOCYTES NFR BLD AUTO: 7.9 %
NEUTROPHILS # BLD AUTO: 9.06 X10 (3) UL (ref 1.5–7.7)
NEUTROPHILS # BLD AUTO: 9.06 X10(3) UL (ref 1.5–7.7)
NEUTROPHILS NFR BLD AUTO: 80.7 %
NITRITE UR QL STRIP.AUTO: NEGATIVE
OSMOLALITY SERPL CALC.SUM OF ELEC: 287 MOSM/KG (ref 275–295)
P AXIS: -3 DEGREES
P-R INTERVAL: 134 MS
PH UR STRIP.AUTO: 5.5 [PH] (ref 5–8)
PLATELET # BLD AUTO: 344 10(3)UL (ref 150–450)
POTASSIUM SERPL-SCNC: 4.2 MMOL/L (ref 3.5–5.1)
PROT SERPL-MCNC: 7.5 G/DL (ref 6.4–8.2)
PROT UR STRIP.AUTO-MCNC: NEGATIVE MG/DL
Q-T INTERVAL: 336 MS
QRS DURATION: 80 MS
QTC CALCULATION (BEZET): 420 MS
R AXIS: 24 DEGREES
RBC # BLD AUTO: 4.19 X10(6)UL
RBC UR QL AUTO: NEGATIVE
RH BLOOD TYPE: POSITIVE
SARS-COV-2 RNA RESP QL NAA+PROBE: NOT DETECTED
SODIUM SERPL-SCNC: 136 MMOL/L (ref 136–145)
SP GR UR STRIP.AUTO: 1.02 (ref 1–1.03)
T AXIS: 41 DEGREES
TROPONIN I HIGH SENSITIVITY: 8 NG/L
UROBILINOGEN UR STRIP.AUTO-MCNC: 0.2 MG/DL
VENTRICULAR RATE: 94 BPM
WBC # BLD AUTO: 11.2 X10(3) UL (ref 4–11)

## 2022-12-12 PROCEDURE — 71045 X-RAY EXAM CHEST 1 VIEW: CPT | Performed by: EMERGENCY MEDICINE

## 2022-12-12 PROCEDURE — 71275 CT ANGIOGRAPHY CHEST: CPT | Performed by: EMERGENCY MEDICINE

## 2022-12-12 PROCEDURE — 99223 1ST HOSP IP/OBS HIGH 75: CPT | Performed by: HOSPITALIST

## 2022-12-12 RX ORDER — HYDRALAZINE HYDROCHLORIDE 20 MG/ML
10 INJECTION INTRAMUSCULAR; INTRAVENOUS EVERY 4 HOURS PRN
Status: DISCONTINUED | OUTPATIENT
Start: 2022-12-12 | End: 2022-12-14

## 2022-12-12 RX ORDER — GUAIFENESIN 600 MG/1
1200 TABLET, EXTENDED RELEASE ORAL 2 TIMES DAILY
COMMUNITY
End: 2022-12-19 | Stop reason: ALTCHOICE

## 2022-12-12 RX ORDER — BENZONATATE 100 MG/1
200 CAPSULE ORAL 3 TIMES DAILY PRN
Status: DISCONTINUED | OUTPATIENT
Start: 2022-12-12 | End: 2022-12-14

## 2022-12-12 RX ORDER — ECHINACEA PURPUREA EXTRACT 125 MG
1 TABLET ORAL
Status: DISCONTINUED | OUTPATIENT
Start: 2022-12-12 | End: 2022-12-14

## 2022-12-12 RX ORDER — SENNOSIDES 8.6 MG
17.2 TABLET ORAL NIGHTLY PRN
Status: DISCONTINUED | OUTPATIENT
Start: 2022-12-12 | End: 2022-12-14

## 2022-12-12 RX ORDER — GUAIFENESIN 600 MG/1
1200 TABLET, EXTENDED RELEASE ORAL 2 TIMES DAILY
Status: DISCONTINUED | OUTPATIENT
Start: 2022-12-12 | End: 2022-12-12

## 2022-12-12 RX ORDER — ONDANSETRON 2 MG/ML
INJECTION INTRAMUSCULAR; INTRAVENOUS
Status: COMPLETED
Start: 2022-12-12 | End: 2022-12-12

## 2022-12-12 RX ORDER — ONDANSETRON 2 MG/ML
4 INJECTION INTRAMUSCULAR; INTRAVENOUS
Status: COMPLETED | OUTPATIENT
Start: 2022-12-12 | End: 2022-12-12

## 2022-12-12 RX ORDER — HYDROMORPHONE HYDROCHLORIDE 1 MG/ML
0.5 INJECTION, SOLUTION INTRAMUSCULAR; INTRAVENOUS; SUBCUTANEOUS EVERY 30 MIN PRN
Status: DISCONTINUED | OUTPATIENT
Start: 2022-12-12 | End: 2022-12-12

## 2022-12-12 RX ORDER — MELATONIN
3 NIGHTLY PRN
Status: DISCONTINUED | OUTPATIENT
Start: 2022-12-12 | End: 2022-12-14

## 2022-12-12 RX ORDER — ONDANSETRON 2 MG/ML
4 INJECTION INTRAMUSCULAR; INTRAVENOUS EVERY 4 HOURS PRN
Status: DISCONTINUED | OUTPATIENT
Start: 2022-12-12 | End: 2022-12-12

## 2022-12-12 RX ORDER — SODIUM CHLORIDE 9 MG/ML
INJECTION, SOLUTION INTRAVENOUS CONTINUOUS
Status: DISCONTINUED | OUTPATIENT
Start: 2022-12-12 | End: 2022-12-13

## 2022-12-12 RX ORDER — DIPHENHYDRAMINE HYDROCHLORIDE 12.5 MG/5ML
25 SOLUTION ORAL NIGHTLY
Status: DISCONTINUED | OUTPATIENT
Start: 2022-12-12 | End: 2022-12-14

## 2022-12-12 RX ORDER — ONDANSETRON 2 MG/ML
8 INJECTION INTRAMUSCULAR; INTRAVENOUS EVERY 6 HOURS PRN
Status: DISCONTINUED | OUTPATIENT
Start: 2022-12-12 | End: 2022-12-14

## 2022-12-12 RX ORDER — POLYETHYLENE GLYCOL 3350 17 G/17G
17 POWDER, FOR SOLUTION ORAL DAILY PRN
Status: DISCONTINUED | OUTPATIENT
Start: 2022-12-12 | End: 2022-12-14

## 2022-12-12 RX ORDER — METOCLOPRAMIDE HYDROCHLORIDE 5 MG/ML
INJECTION INTRAMUSCULAR; INTRAVENOUS
Status: COMPLETED
Start: 2022-12-12 | End: 2022-12-12

## 2022-12-12 RX ORDER — ACETAMINOPHEN 500 MG
500 TABLET ORAL EVERY 4 HOURS PRN
Status: DISCONTINUED | OUTPATIENT
Start: 2022-12-12 | End: 2022-12-14

## 2022-12-12 RX ORDER — SODIUM CHLORIDE 9 MG/ML
INJECTION, SOLUTION INTRAVENOUS CONTINUOUS
Status: DISCONTINUED | OUTPATIENT
Start: 2022-12-12 | End: 2022-12-14

## 2022-12-12 RX ORDER — HYDROMORPHONE HYDROCHLORIDE 1 MG/ML
0.4 INJECTION, SOLUTION INTRAMUSCULAR; INTRAVENOUS; SUBCUTANEOUS EVERY 2 HOUR PRN
Status: DISCONTINUED | OUTPATIENT
Start: 2022-12-12 | End: 2022-12-14

## 2022-12-12 RX ORDER — HYDROMORPHONE HYDROCHLORIDE 1 MG/ML
0.8 INJECTION, SOLUTION INTRAMUSCULAR; INTRAVENOUS; SUBCUTANEOUS EVERY 2 HOUR PRN
Status: DISCONTINUED | OUTPATIENT
Start: 2022-12-12 | End: 2022-12-14

## 2022-12-12 RX ORDER — HYDROMORPHONE HYDROCHLORIDE 1 MG/ML
0.2 INJECTION, SOLUTION INTRAMUSCULAR; INTRAVENOUS; SUBCUTANEOUS EVERY 2 HOUR PRN
Status: DISCONTINUED | OUTPATIENT
Start: 2022-12-12 | End: 2022-12-14

## 2022-12-12 RX ORDER — METOCLOPRAMIDE HYDROCHLORIDE 5 MG/ML
5 INJECTION INTRAMUSCULAR; INTRAVENOUS ONCE
Status: COMPLETED | OUTPATIENT
Start: 2022-12-12 | End: 2022-12-12

## 2022-12-12 RX ORDER — SODIUM CHLORIDE 9 MG/ML
INJECTION, SOLUTION INTRAVENOUS CONTINUOUS
Status: ACTIVE | OUTPATIENT
Start: 2022-12-12 | End: 2022-12-12

## 2022-12-12 RX ORDER — BISACODYL 10 MG
10 SUPPOSITORY, RECTAL RECTAL
Status: DISCONTINUED | OUTPATIENT
Start: 2022-12-12 | End: 2022-12-14

## 2022-12-12 NOTE — ED QUICK NOTES
Orders for admission, patient is aware of plan and ready to go upstairs. Any questions, please call ED DANY Moeller  at extension [phone]. Vaccinated?  Yes  Type of COVID test sent: Rapid - Negative  COVID Suspicion level: Low      Titratable drug(s) infusin.9 NaCl  Rate: 125ml/hr    LOC at time of transport: A/Ox4    Other pertinent information: N/A    CIWA score= N/A  NIH score= N/A

## 2022-12-12 NOTE — ED INITIAL ASSESSMENT (HPI)
Shortness of breath abdominal pain and throwing up blood since few days . Unable to keep anything in the stomach. Known case esophageal cancer . His doctor advised  To go to ER .

## 2022-12-12 NOTE — TELEPHONE ENCOUNTER
Pt called and stated that he has had a increase in coughing and vomiting. Stated the vomiting has had blood in it. Pt has had no food since last Saturday and cannot tolerate anything. Pt needed to see Dr. Lisbet Jack, but pt stated that Dr. Amando Arrieta office did not schedule them until December 29. With the progression they are concerned if they should go to the ED. Informed pt that if he has progressed he should go to the ED for evaluation.

## 2022-12-12 NOTE — PLAN OF CARE
Assumed care @1630  HTN    A&Ox4, RA  NSR ,   Abd Pain with coughing, PRN Dilaudid given with relief. Up as tolerated. NPO. Adequate urine output. No BM   Zofran given for nausea x1  J tube COOPER C/D/I  IVF infusing [email or web address] [email or web address]    Updated POC with patient and family. Will continue to monitor.        Problem: PAIN - ADULT  Goal: Verbalizes/displays adequate comfort level or patient's stated pain goal  Description: INTERVENTIONS:  - Encourage pt to monitor pain and request assistance  - Assess pain using appropriate pain scale  - Administer analgesics based on type and severity of pain and evaluate response  - Implement non-pharmacological measures as appropriate and evaluate response  - Consider cultural and social influences on pain and pain management  - Manage/alleviate anxiety  - Utilize distraction and/or relaxation techniques  - Monitor for opioid side effects  - Notify MD/LIP if interventions unsuccessful or patient reports new pain  - Anticipate increased pain with activity and pre-medicate as appropriate  Outcome: Progressing     Problem: GASTROINTESTINAL - ADULT  Goal: Minimal or absence of nausea and vomiting  Description: INTERVENTIONS:  - Maintain adequate hydration with IV or PO as ordered and tolerated  - Nasogastric tube to low intermittent suction as ordered  - Evaluate effectiveness of ordered antiemetic medications  - Provide nonpharmacologic comfort measures as appropriate  - Advance diet as tolerated, if ordered  - Obtain nutritional consult as needed  - Evaluate fluid balance  Outcome: Progressing  Goal: Maintains adequate nutritional intake (undernourished)  Description: INTERVENTIONS:  - Monitor percentage of each meal consumed  - Identify factors contributing to decreased intake, treat as appropriate  - Assist with meals as needed  - Monitor I&O, WT and lab values  - Obtain nutritional consult as needed  - Optimize oral hygiene and moisture  - Encourage food from home; allow for food preferences  - Enhance eating environment  Outcome: Progressing

## 2022-12-13 ENCOUNTER — ANESTHESIA EVENT (OUTPATIENT)
Dept: ENDOSCOPY | Facility: HOSPITAL | Age: 52
End: 2022-12-13
Payer: COMMERCIAL

## 2022-12-13 ENCOUNTER — ANESTHESIA (OUTPATIENT)
Dept: ENDOSCOPY | Facility: HOSPITAL | Age: 52
End: 2022-12-13
Payer: COMMERCIAL

## 2022-12-13 ENCOUNTER — APPOINTMENT (OUTPATIENT)
Dept: GENERAL RADIOLOGY | Facility: HOSPITAL | Age: 52
DRG: 393 | End: 2022-12-13
Attending: INTERNAL MEDICINE
Payer: COMMERCIAL

## 2022-12-13 ENCOUNTER — APPOINTMENT (OUTPATIENT)
Dept: GENERAL RADIOLOGY | Facility: HOSPITAL | Age: 52
End: 2022-12-13
Attending: INTERNAL MEDICINE
Payer: COMMERCIAL

## 2022-12-13 LAB
ANION GAP SERPL CALC-SCNC: 4 MMOL/L (ref 0–18)
BASOPHILS # BLD AUTO: 0.08 X10(3) UL (ref 0–0.2)
BASOPHILS NFR BLD AUTO: 0.7 %
BUN BLD-MCNC: 17 MG/DL (ref 7–18)
C DIFF TOX B STL QL: NEGATIVE
CALCIUM BLD-MCNC: 9.5 MG/DL (ref 8.5–10.1)
CHLORIDE SERPL-SCNC: 105 MMOL/L (ref 98–112)
CO2 SERPL-SCNC: 28 MMOL/L (ref 21–32)
CREAT BLD-MCNC: 0.6 MG/DL
EOSINOPHIL # BLD AUTO: 0.06 X10(3) UL (ref 0–0.7)
EOSINOPHIL NFR BLD AUTO: 0.5 %
ERYTHROCYTE [DISTWIDTH] IN BLOOD BY AUTOMATED COUNT: 17.4 %
GFR SERPLBLD BASED ON 1.73 SQ M-ARVRAT: 115 ML/MIN/1.73M2 (ref 60–?)
GLUCOSE BLD-MCNC: 104 MG/DL (ref 70–99)
GLUCOSE BLD-MCNC: 112 MG/DL (ref 70–99)
GLUCOSE BLD-MCNC: 121 MG/DL (ref 70–99)
GLUCOSE BLD-MCNC: 133 MG/DL (ref 70–99)
GLUCOSE BLD-MCNC: 134 MG/DL (ref 70–99)
HCT VFR BLD AUTO: 33.8 %
HGB BLD-MCNC: 10.4 G/DL
IMM GRANULOCYTES # BLD AUTO: 0.17 X10(3) UL (ref 0–1)
IMM GRANULOCYTES NFR BLD: 1.5 %
LYMPHOCYTES # BLD AUTO: 0.81 X10(3) UL (ref 1–4)
LYMPHOCYTES NFR BLD AUTO: 7.1 %
MAGNESIUM SERPL-MCNC: 1.7 MG/DL (ref 1.6–2.6)
MCH RBC QN AUTO: 27.7 PG (ref 26–34)
MCHC RBC AUTO-ENTMCNC: 30.8 G/DL (ref 31–37)
MCV RBC AUTO: 90.1 FL
MONOCYTES # BLD AUTO: 0.86 X10(3) UL (ref 0.1–1)
MONOCYTES NFR BLD AUTO: 7.5 %
NEUTROPHILS # BLD AUTO: 9.48 X10 (3) UL (ref 1.5–7.7)
NEUTROPHILS # BLD AUTO: 9.48 X10(3) UL (ref 1.5–7.7)
NEUTROPHILS NFR BLD AUTO: 82.7 %
OSMOLALITY SERPL CALC.SUM OF ELEC: 288 MOSM/KG (ref 275–295)
PHOSPHATE SERPL-MCNC: 4 MG/DL (ref 2.5–4.9)
PLATELET # BLD AUTO: 291 10(3)UL (ref 150–450)
POTASSIUM SERPL-SCNC: 4.4 MMOL/L (ref 3.5–5.1)
RBC # BLD AUTO: 3.75 X10(6)UL
SODIUM SERPL-SCNC: 137 MMOL/L (ref 136–145)
WBC # BLD AUTO: 11.5 X10(3) UL (ref 4–11)

## 2022-12-13 PROCEDURE — 0DP58DZ REMOVAL OF INTRALUMINAL DEVICE FROM ESOPHAGUS, VIA NATURAL OR ARTIFICIAL OPENING ENDOSCOPIC: ICD-10-PCS | Performed by: INTERNAL MEDICINE

## 2022-12-13 PROCEDURE — 0D748DZ DILATION OF ESOPHAGOGASTRIC JUNCTION WITH INTRALUMINAL DEVICE, VIA NATURAL OR ARTIFICIAL OPENING ENDOSCOPIC: ICD-10-PCS | Performed by: INTERNAL MEDICINE

## 2022-12-13 PROCEDURE — 99232 SBSQ HOSP IP/OBS MODERATE 35: CPT | Performed by: HOSPITALIST

## 2022-12-13 PROCEDURE — 3E0336Z INTRODUCTION OF NUTRITIONAL SUBSTANCE INTO PERIPHERAL VEIN, PERCUTANEOUS APPROACH: ICD-10-PCS | Performed by: HOSPITALIST

## 2022-12-13 PROCEDURE — 0DC38ZZ EXTIRPATION OF MATTER FROM LOWER ESOPHAGUS, VIA NATURAL OR ARTIFICIAL OPENING ENDOSCOPIC: ICD-10-PCS | Performed by: INTERNAL MEDICINE

## 2022-12-13 PROCEDURE — 74360 X-RAY GUIDE GI DILATION: CPT | Performed by: INTERNAL MEDICINE

## 2022-12-13 DEVICE — STENT AND ELECTROCAUTERY - ENHANCED DELIVERY SYSTEM
Type: IMPLANTABLE DEVICE | Site: STOMACH | Status: FUNCTIONAL
Brand: AXIOS™

## 2022-12-13 RX ORDER — MAGNESIUM SULFATE HEPTAHYDRATE 40 MG/ML
2 INJECTION, SOLUTION INTRAVENOUS ONCE
Status: COMPLETED | OUTPATIENT
Start: 2022-12-13 | End: 2022-12-13

## 2022-12-13 RX ORDER — LIDOCAINE HYDROCHLORIDE 10 MG/ML
INJECTION, SOLUTION EPIDURAL; INFILTRATION; INTRACAUDAL; PERINEURAL AS NEEDED
Status: DISCONTINUED | OUTPATIENT
Start: 2022-12-13 | End: 2022-12-13 | Stop reason: SURG

## 2022-12-13 RX ORDER — GUAIFENESIN 400 MG/1
400 TABLET ORAL ONCE
Status: DISCONTINUED | OUTPATIENT
Start: 2022-12-13 | End: 2022-12-14

## 2022-12-13 RX ORDER — SODIUM CHLORIDE, SODIUM LACTATE, POTASSIUM CHLORIDE, CALCIUM CHLORIDE 600; 310; 30; 20 MG/100ML; MG/100ML; MG/100ML; MG/100ML
INJECTION, SOLUTION INTRAVENOUS CONTINUOUS PRN
Status: DISCONTINUED | OUTPATIENT
Start: 2022-12-13 | End: 2022-12-13 | Stop reason: SURG

## 2022-12-13 RX ORDER — METOPROLOL TARTRATE 5 MG/5ML
5 INJECTION INTRAVENOUS EVERY 6 HOURS
Status: DISCONTINUED | OUTPATIENT
Start: 2022-12-13 | End: 2022-12-14

## 2022-12-13 RX ORDER — SODIUM BICARBONATE 325 MG/1
325 TABLET ORAL AS NEEDED
Status: DISCONTINUED | OUTPATIENT
Start: 2022-12-13 | End: 2022-12-14

## 2022-12-13 RX ADMIN — LIDOCAINE HYDROCHLORIDE 50 MG: 10 INJECTION, SOLUTION EPIDURAL; INFILTRATION; INTRACAUDAL; PERINEURAL at 16:10:00

## 2022-12-13 RX ADMIN — SODIUM CHLORIDE, SODIUM LACTATE, POTASSIUM CHLORIDE, CALCIUM CHLORIDE: 600; 310; 30; 20 INJECTION, SOLUTION INTRAVENOUS at 16:04:00

## 2022-12-13 NOTE — CM/SW NOTE
SW familiar with pt and current services in place. Pt current with [facility] and [facility] for TPN. Started referrals for dc planning. Sent labs, mar, TYRESE for [facility] and Flush orders for [facility]. SW to remain available for dc recommendations and plans. Please notify SW if any changes with nutrition plans. Will need to send final orders with progress notes.      BENY Francois  Discharge 2011 [facility]

## 2022-12-13 NOTE — PLAN OF CARE
Assumed care of pt around 1900. A/o x4. RA. NSR to ST on tele. Contact isolation precautions. Productive cough w/ yellow/tan/frothy/thick sputum. Scheduled Robitussin for cough. PRN Zofran given for nausea. C/o abd pain when coughing. Given PRN Dilaudid w/ relief. Voiding per urinal.   Currently resting in bed w/ call light within reach.

## 2022-12-13 NOTE — ANESTHESIA POSTPROCEDURE EVALUATION
[address] Patient Status:  Inpatient   Age/Gender 47year old male MRN [id number]   Location [address] [address] Attending Cj Olvera MD   Hosp Day # 1 PCP Maria Luz Larsen MD       Anesthesia Post-op Note    ESOPHAGOGASTRODUODENOSCOPY (EGD)WITH ESOPHAGEAL STENT REMOVAL, suture removal pyloric stent placement    Procedure Summary     Date: 12/13/22 Room / Location: [facility] ENDOSCOPY 04 / 1404 [facility] ENDOSCOPY    Anesthesia Start: 1706 Anesthesia Stop: 0038    Procedure: ESOPHAGOGASTRODUODENOSCOPY (EGD)WITH ESOPHAGEAL STENT REMOVAL, suture removal pyloric stent placement Diagnosis: (post surgical changes, foreign body in stomach)    Surgeons: Bebeto Novak MD Anesthesiologist: Demian Worley MD    Anesthesia Type: MAC ASA Status: 3          Anesthesia Type: MAC    Vitals Value Taken Time   /87 12/13/22 1655   Temp 98.0 12/13/22 1657   Pulse 76 12/13/22 1656   Resp 20 12/13/22 1656   SpO2 96 % 12/13/22 1656   Vitals shown include unvalidated device data. Patient Location: Endoscopy    Anesthesia Type: MAC    Airway Patency: patent    Postop Pain Control: adequate    Mental Status: mildly sedated but able to meaningfully participate in the post-anesthesia evaluation    Nausea/Vomiting: none    Cardiopulmonary/Hydration status: stable euvolemic    Complications: no apparent anesthesia related complications    Postop vital signs: stable    Dental Exam: Unchanged from Preop    Patient to be discharged from PACU when criteria met.

## 2022-12-14 ENCOUNTER — TELEPHONE (OUTPATIENT)
Dept: HEMATOLOGY/ONCOLOGY | Facility: HOSPITAL | Age: 52
End: 2022-12-14

## 2022-12-14 VITALS
HEIGHT: 74 IN | DIASTOLIC BLOOD PRESSURE: 82 MMHG | HEART RATE: 84 BPM | TEMPERATURE: 98 F | WEIGHT: 219 LBS | RESPIRATION RATE: 18 BRPM | OXYGEN SATURATION: 95 % | SYSTOLIC BLOOD PRESSURE: 147 MMHG | BODY MASS INDEX: 28.11 KG/M2

## 2022-12-14 LAB
ADENOVIRUS F 40/41 PCR: NEGATIVE
ALBUMIN SERPL-MCNC: 2.1 G/DL (ref 3.4–5)
ALBUMIN/GLOB SERPL: 0.5 {RATIO} (ref 1–2)
ALP LIVER SERPL-CCNC: 104 U/L
ALT SERPL-CCNC: 20 U/L
ANION GAP SERPL CALC-SCNC: 4 MMOL/L (ref 0–18)
AST SERPL-CCNC: 15 U/L (ref 15–37)
ASTROVIRUS PCR: NEGATIVE
BILIRUB SERPL-MCNC: 0.4 MG/DL (ref 0.1–2)
BUN BLD-MCNC: 14 MG/DL (ref 7–18)
C CAYETANENSIS DNA SPEC QL NAA+PROBE: NEGATIVE
CALCIUM BLD-MCNC: 8.7 MG/DL (ref 8.5–10.1)
CAMPY SP DNA.DIARRHEA STL QL NAA+PROBE: NEGATIVE
CHLORIDE SERPL-SCNC: 105 MMOL/L (ref 98–112)
CO2 SERPL-SCNC: 30 MMOL/L (ref 21–32)
CREAT BLD-MCNC: 0.38 MG/DL
CRYPTOSP DNA SPEC QL NAA+PROBE: NEGATIVE
EAEC PAA PLAS AGGR+AATA ST NAA+NON-PRB: NEGATIVE
EC STX1+STX2 + H7 FLIC SPEC NAA+PROBE: NEGATIVE
ENTAMOEBA HISTOLYTICA PCR: NEGATIVE
EPEC EAE GENE STL QL NAA+NON-PROBE: NEGATIVE
ETEC LTA+ST1A+ST1B TOX ST NAA+NON-PROBE: NEGATIVE
GFR SERPLBLD BASED ON 1.73 SQ M-ARVRAT: 133 ML/MIN/1.73M2 (ref 60–?)
GIARDIA LAMBLIA PCR: NEGATIVE
GLOBULIN PLAS-MCNC: 3.9 G/DL (ref 2.8–4.4)
GLUCOSE BLD-MCNC: 112 MG/DL (ref 70–99)
GLUCOSE BLD-MCNC: 119 MG/DL (ref 70–99)
GLUCOSE BLD-MCNC: 141 MG/DL (ref 70–99)
GLUCOSE BLD-MCNC: 143 MG/DL (ref 70–99)
MAGNESIUM SERPL-MCNC: 1.9 MG/DL (ref 1.6–2.6)
NOROVIRUS GI/GII PCR: NEGATIVE
OSMOLALITY SERPL CALC.SUM OF ELEC: 289 MOSM/KG (ref 275–295)
P SHIGELLOIDES DNA STL QL NAA+PROBE: NEGATIVE
PHOSPHATE SERPL-MCNC: 3.3 MG/DL (ref 2.5–4.9)
POTASSIUM SERPL-SCNC: 4.3 MMOL/L (ref 3.5–5.1)
PROT SERPL-MCNC: 6 G/DL (ref 6.4–8.2)
ROTAVIRUS A PCR: NEGATIVE
SALMONELLA DNA SPEC QL NAA+PROBE: NEGATIVE
SAPOVIRUS PCR: NEGATIVE
SHIGELLA SP+EIEC IPAH ST NAA+NON-PROBE: NEGATIVE
SODIUM SERPL-SCNC: 139 MMOL/L (ref 136–145)
TRIGL SERPL-MCNC: 74 MG/DL (ref 30–149)
V CHOLERAE DNA SPEC QL NAA+PROBE: NEGATIVE
VIBRIO DNA SPEC NAA+PROBE: NEGATIVE
YERSINIA DNA SPEC NAA+PROBE: NEGATIVE

## 2022-12-14 PROCEDURE — 99239 HOSP IP/OBS DSCHRG MGMT >30: CPT | Performed by: HOSPITALIST

## 2022-12-14 RX ORDER — OXYCODONE HCL 5 MG/5 ML
5 SOLUTION, ORAL ORAL EVERY 8 HOURS PRN
Qty: 100 ML | Refills: 0 | Status: SHIPPED | OUTPATIENT
Start: 2022-12-14 | End: 2022-12-19 | Stop reason: ALTCHOICE

## 2022-12-14 NOTE — PLAN OF CARE
Assumed care 1900  Patient alert, oriented x4  Dilaudid for abdominal pain  Patient requested tube feeds to be turned off after 3 hours due to nausea  zofran given  Still coughing up white, pink tinged sputum at times  TPN running

## 2022-12-14 NOTE — PLAN OF CARE
Problem: PAIN - ADULT  Goal: Verbalizes/displays adequate comfort level or patient's stated pain goal  Description: INTERVENTIONS:  - Encourage pt to monitor pain and request assistance  - Assess pain using appropriate pain scale  - Administer analgesics based on type and severity of pain and evaluate response  - Implement non-pharmacological measures as appropriate and evaluate response  - Consider cultural and social influences on pain and pain management  - Manage/alleviate anxiety  - Utilize distraction and/or relaxation techniques  - Monitor for opioid side effects  - Notify MD/LIP if interventions unsuccessful or patient reports new pain  - Anticipate increased pain with activity and pre-medicate as appropriate  Outcome: Progressing     Problem: GASTROINTESTINAL - ADULT  Goal: Minimal or absence of nausea and vomiting  Description: INTERVENTIONS:  - Maintain adequate hydration with IV or PO as ordered and tolerated  - Nasogastric tube to low intermittent suction as ordered  - Evaluate effectiveness of ordered antiemetic medications  - Provide nonpharmacologic comfort measures as appropriate  - Advance diet as tolerated, if ordered  - Obtain nutritional consult as needed  - Evaluate fluid balance  Outcome: Progressing  Goal: Maintains adequate nutritional intake (undernourished)  Description: INTERVENTIONS:  - Monitor percentage of each meal consumed  - Identify factors contributing to decreased intake, treat as appropriate  - Assist with meals as needed  - Monitor I&O, WT and lab values  - Obtain nutritional consult as needed  - Optimize oral hygiene and moisture  - Encourage food from home; allow for food preferences  - Enhance eating environment  Outcome: Progressing

## 2022-12-14 NOTE — DIETARY NOTE
Clinical Nutrition    Per MD/Notes, potential discharge this date. No changes to home TPN - ok to resume home TPN at discharge. [address], LDN, [address] Dietitian  Phone [phone]

## 2022-12-14 NOTE — TELEPHONE ENCOUNTER
Patient is getting discharged today and calling to see if Dr. Franci Marrero would see him before he go home today. He would like a call back.

## 2022-12-14 NOTE — PROGRESS NOTES
NURSING DISCHARGE NOTE    Assumed care of patient at 07:30 am  Patient reports improvement with pyloric stent placed yesterday. Tolerating clear liquids including, apple juice and water. Patient medically cleared by consults. Social work updated [facility] and [facility] on pt's discharge. Patient updated on POC. Discharge AVS completed. Discharge instructions given and reviewed with patient. Answered all questions, pt v/u. Pt discharge home via wife. Discharged Home via Wheelchair. Accompanied by Spouse and Support staff  Belongings Taken by patient/family.

## 2022-12-14 NOTE — DISCHARGE INSTRUCTIONS
Hold aspirin for 4 days; call your doctor if you still have any visible blood when retching    Per GI: Small frequent low residue diet as outpatient

## 2022-12-14 NOTE — CM/SW NOTE
Noted DC orders placed, \"if ok with other specialists\". Needing clarification on nutrition plans at DC. TPN vs Tube Feeds. SW sent yesterday's RD note, TPN order as well as today's labs, mar to [facility] and [facility]. Pt reported to RN  he has 7 TPN bags at home, SW notified [facility]. Addendum: pt discussed in rounds this AM. Will dc back home on TPN. [facility] and [facility] made aware of dc plans.      Yaneth Briscoe, [facility] [place]  Discharge [address].

## 2022-12-15 ENCOUNTER — PATIENT OUTREACH (OUTPATIENT)
Dept: CASE MANAGEMENT | Age: 52
End: 2022-12-15

## 2022-12-19 ENCOUNTER — NURSE ONLY (OUTPATIENT)
Dept: HEMATOLOGY/ONCOLOGY | Age: 52
End: 2022-12-19
Attending: INTERNAL MEDICINE
Payer: COMMERCIAL

## 2022-12-19 ENCOUNTER — OFFICE VISIT (OUTPATIENT)
Dept: HEMATOLOGY/ONCOLOGY | Age: 52
End: 2022-12-19
Attending: INTERNAL MEDICINE
Payer: COMMERCIAL

## 2022-12-19 VITALS
BODY MASS INDEX: 30 KG/M2 | WEIGHT: 236 LBS | SYSTOLIC BLOOD PRESSURE: 144 MMHG | TEMPERATURE: 97 F | OXYGEN SATURATION: 99 % | HEART RATE: 88 BPM | DIASTOLIC BLOOD PRESSURE: 85 MMHG

## 2022-12-19 DIAGNOSIS — C15.5 MALIGNANT NEOPLASM OF LOWER THIRD OF ESOPHAGUS (HCC): Primary | ICD-10-CM

## 2022-12-19 DIAGNOSIS — C15.9 MALIGNANT NEOPLASM OF ESOPHAGUS, UNSPECIFIED LOCATION (HCC): Primary | ICD-10-CM

## 2022-12-19 DIAGNOSIS — C15.5 MALIGNANT NEOPLASM OF LOWER THIRD OF ESOPHAGUS (HCC): ICD-10-CM

## 2022-12-19 LAB
ALBUMIN SERPL-MCNC: 2.5 G/DL (ref 3.4–5)
ALBUMIN/GLOB SERPL: 0.5 {RATIO} (ref 1–2)
ALP LIVER SERPL-CCNC: 139 U/L
ALT SERPL-CCNC: 56 U/L
ANION GAP SERPL CALC-SCNC: 7 MMOL/L (ref 0–18)
AST SERPL-CCNC: 31 U/L (ref 15–37)
BASOPHILS # BLD: 0 X10(3) UL (ref 0–0.2)
BASOPHILS NFR BLD: 0 %
BILIRUB SERPL-MCNC: 0.3 MG/DL (ref 0.1–2)
BUN BLD-MCNC: 23 MG/DL (ref 7–18)
CALCIUM BLD-MCNC: 9 MG/DL (ref 8.5–10.1)
CANCER AG19-9 SERPL-ACNC: 48.2 U/ML (ref ?–37)
CHLORIDE SERPL-SCNC: 105 MMOL/L (ref 98–112)
CO2 SERPL-SCNC: 25 MMOL/L (ref 21–32)
CREAT BLD-MCNC: 0.53 MG/DL
EOSINOPHIL # BLD: 0.19 X10(3) UL (ref 0–0.7)
EOSINOPHIL NFR BLD: 2 %
ERYTHROCYTE [DISTWIDTH] IN BLOOD BY AUTOMATED COUNT: 17.7 %
GFR SERPLBLD BASED ON 1.73 SQ M-ARVRAT: 120 ML/MIN/1.73M2 (ref 60–?)
GLOBULIN PLAS-MCNC: 4.6 G/DL (ref 2.8–4.4)
GLUCOSE BLD-MCNC: 128 MG/DL (ref 70–99)
HCT VFR BLD AUTO: 35.1 %
HGB BLD-MCNC: 10.9 G/DL
LYMPHOCYTES NFR BLD: 0.96 X10(3) UL (ref 1–4)
LYMPHOCYTES NFR BLD: 10 %
MCH RBC QN AUTO: 27 PG (ref 26–34)
MCHC RBC AUTO-ENTMCNC: 31.1 G/DL (ref 31–37)
MCV RBC AUTO: 87.1 FL
METAMYELOCYTES # BLD: 0.19 X10(3) UL
METAMYELOCYTES NFR BLD: 2 %
MONOCYTES # BLD: 0.77 X10(3) UL (ref 0.1–1)
MONOCYTES NFR BLD: 8 %
MORPHOLOGY: NORMAL
NEUTROPHILS # BLD AUTO: 7.4 X10 (3) UL (ref 1.5–7.7)
NEUTROPHILS NFR BLD: 72 %
NEUTS BAND NFR BLD: 6 %
NEUTS HYPERSEG # BLD: 7.49 X10(3) UL (ref 1.5–7.7)
OSMOLALITY SERPL CALC.SUM OF ELEC: 289 MOSM/KG (ref 275–295)
PLATELET # BLD AUTO: 325 10(3)UL (ref 150–450)
PLATELET MORPHOLOGY: NORMAL
POTASSIUM SERPL-SCNC: 4.5 MMOL/L (ref 3.5–5.1)
PROT SERPL-MCNC: 7.1 G/DL (ref 6.4–8.2)
RBC # BLD AUTO: 4.03 X10(6)UL
SODIUM SERPL-SCNC: 137 MMOL/L (ref 136–145)
TOTAL CELLS COUNTED BLD: 100
WBC # BLD AUTO: 9.6 X10(3) UL (ref 4–11)

## 2022-12-19 PROCEDURE — 80053 COMPREHEN METABOLIC PANEL: CPT

## 2022-12-19 PROCEDURE — 99215 OFFICE O/P EST HI 40 MIN: CPT | Performed by: INTERNAL MEDICINE

## 2022-12-19 PROCEDURE — 85025 COMPLETE CBC W/AUTO DIFF WBC: CPT

## 2022-12-19 PROCEDURE — 85007 BL SMEAR W/DIFF WBC COUNT: CPT

## 2022-12-19 PROCEDURE — 85027 COMPLETE CBC AUTOMATED: CPT

## 2022-12-19 PROCEDURE — 86301 IMMUNOASSAY TUMOR CA 19-9: CPT

## 2022-12-19 RX ORDER — METOPROLOL SUCCINATE 50 MG/1
TABLET, EXTENDED RELEASE ORAL
Qty: 90 TABLET | Refills: 0 | Status: SHIPPED | OUTPATIENT
Start: 2022-12-19

## 2022-12-19 RX ORDER — ATORVASTATIN CALCIUM 20 MG/1
TABLET, FILM COATED ORAL
Qty: 90 TABLET | Refills: 0 | Status: SHIPPED | OUTPATIENT
Start: 2022-12-19

## 2022-12-19 NOTE — PROGRESS NOTES
Outpatient Oncology Care Plan   Problem list:   fatigue   Problems related to:   self care   Interventions:   provided general teaching   Expected outcomes:   understands plan of care   Progress towards outcome: making progress     Education Record   Learner: Patient   Barriers / Limitations: None   Method: Discussion   Outcome: Shows understanding   Comments:  Patient here for follow-up. States energy levels are low. Starting to eat by mouth more. Denies much use of J tube. Would like to discuss  next steps in plan of care.

## 2022-12-20 ENCOUNTER — TELEPHONE (OUTPATIENT)
Dept: SURGERY | Facility: CLINIC | Age: 52
End: 2022-12-20

## 2022-12-20 NOTE — TELEPHONE ENCOUNTER
Pt called about inquiring J tube removal.  Pt stated he is no longer using it for medication or nutrition. Pt stated he is following with Brittanie Benton for nutrition.   Schedule patient to see Urban Ramirez on Thursday for possible J tube removal.

## 2022-12-22 ENCOUNTER — NURSE ONLY (OUTPATIENT)
Dept: HEMATOLOGY/ONCOLOGY | Age: 52
End: 2022-12-22
Attending: INTERNAL MEDICINE
Payer: COMMERCIAL

## 2022-12-22 ENCOUNTER — OFFICE VISIT (OUTPATIENT)
Dept: HEMATOLOGY/ONCOLOGY | Age: 52
End: 2022-12-22
Attending: INTERNAL MEDICINE
Payer: COMMERCIAL

## 2022-12-22 ENCOUNTER — OFFICE VISIT (OUTPATIENT)
Dept: SURGERY | Facility: CLINIC | Age: 52
End: 2022-12-22
Payer: COMMERCIAL

## 2022-12-22 VITALS
HEART RATE: 91 BPM | DIASTOLIC BLOOD PRESSURE: 108 MMHG | OXYGEN SATURATION: 96 % | SYSTOLIC BLOOD PRESSURE: 160 MMHG | BODY MASS INDEX: 29 KG/M2 | WEIGHT: 227 LBS

## 2022-12-22 DIAGNOSIS — Z93.4 JEJUNOSTOMY TUBE PRESENT (HCC): Primary | ICD-10-CM

## 2022-12-22 DIAGNOSIS — C15.5 MALIGNANT NEOPLASM OF LOWER THIRD OF ESOPHAGUS (HCC): Primary | ICD-10-CM

## 2022-12-22 DIAGNOSIS — Z71.9 ENCOUNTER FOR HEALTH EDUCATION: Primary | ICD-10-CM

## 2022-12-22 DIAGNOSIS — C15.5 MALIGNANT NEOPLASM OF LOWER THIRD OF ESOPHAGUS (HCC): ICD-10-CM

## 2022-12-22 LAB
ALBUMIN SERPL-MCNC: 2.8 G/DL (ref 3.4–5)
ALBUMIN/GLOB SERPL: 0.6 {RATIO} (ref 1–2)
ALP LIVER SERPL-CCNC: 135 U/L
ALT SERPL-CCNC: 48 U/L
ANION GAP SERPL CALC-SCNC: 7 MMOL/L (ref 0–18)
AST SERPL-CCNC: 20 U/L (ref 15–37)
BASOPHILS # BLD AUTO: 0.03 X10(3) UL (ref 0–0.2)
BASOPHILS NFR BLD AUTO: 0.3 %
BILIRUB SERPL-MCNC: 0.3 MG/DL (ref 0.1–2)
BUN BLD-MCNC: 19 MG/DL (ref 7–18)
CALCIUM BLD-MCNC: 9.2 MG/DL (ref 8.5–10.1)
CANCER AG19-9 SERPL-ACNC: 64.9 U/ML (ref ?–37)
CHLORIDE SERPL-SCNC: 104 MMOL/L (ref 98–112)
CO2 SERPL-SCNC: 26 MMOL/L (ref 21–32)
CREAT BLD-MCNC: 0.53 MG/DL
EOSINOPHIL # BLD AUTO: 0.1 X10(3) UL (ref 0–0.7)
EOSINOPHIL NFR BLD AUTO: 1.1 %
ERYTHROCYTE [DISTWIDTH] IN BLOOD BY AUTOMATED COUNT: 17.7 %
GFR SERPLBLD BASED ON 1.73 SQ M-ARVRAT: 120 ML/MIN/1.73M2 (ref 60–?)
GLOBULIN PLAS-MCNC: 4.6 G/DL (ref 2.8–4.4)
GLUCOSE BLD-MCNC: 134 MG/DL (ref 70–99)
HCT VFR BLD AUTO: 35.5 %
HGB BLD-MCNC: 10.9 G/DL
IMM GRANULOCYTES # BLD AUTO: 0.36 X10(3) UL (ref 0–1)
IMM GRANULOCYTES NFR BLD: 3.8 %
LYMPHOCYTES # BLD AUTO: 0.8 X10(3) UL (ref 1–4)
LYMPHOCYTES NFR BLD AUTO: 8.5 %
MCH RBC QN AUTO: 27 PG (ref 26–34)
MCHC RBC AUTO-ENTMCNC: 30.7 G/DL (ref 31–37)
MCV RBC AUTO: 87.9 FL
MONOCYTES # BLD AUTO: 0.7 X10(3) UL (ref 0.1–1)
MONOCYTES NFR BLD AUTO: 7.4 %
NEUTROPHILS # BLD AUTO: 7.43 X10 (3) UL (ref 1.5–7.7)
NEUTROPHILS # BLD AUTO: 7.43 X10(3) UL (ref 1.5–7.7)
NEUTROPHILS NFR BLD AUTO: 78.9 %
OSMOLALITY SERPL CALC.SUM OF ELEC: 288 MOSM/KG (ref 275–295)
PLATELET # BLD AUTO: 332 10(3)UL (ref 150–450)
POTASSIUM SERPL-SCNC: 4.3 MMOL/L (ref 3.5–5.1)
PROT SERPL-MCNC: 7.4 G/DL (ref 6.4–8.2)
RBC # BLD AUTO: 4.04 X10(6)UL
SODIUM SERPL-SCNC: 137 MMOL/L (ref 136–145)
T4 FREE SERPL-MCNC: 1 NG/DL (ref 0.8–1.7)
TSI SER-ACNC: 0.65 MIU/ML (ref 0.36–3.74)
WBC # BLD AUTO: 9.4 X10(3) UL (ref 4–11)

## 2022-12-22 PROCEDURE — 85025 COMPLETE CBC W/AUTO DIFF WBC: CPT

## 2022-12-22 PROCEDURE — 84439 ASSAY OF FREE THYROXINE: CPT

## 2022-12-22 PROCEDURE — 86301 IMMUNOASSAY TUMOR CA 19-9: CPT

## 2022-12-22 PROCEDURE — 99213 OFFICE O/P EST LOW 20 MIN: CPT | Performed by: CLINICAL NURSE SPECIALIST

## 2022-12-22 PROCEDURE — 84443 ASSAY THYROID STIM HORMONE: CPT

## 2022-12-22 PROCEDURE — 3077F SYST BP >= 140 MM HG: CPT | Performed by: PHYSICIAN ASSISTANT

## 2022-12-22 PROCEDURE — 99024 POSTOP FOLLOW-UP VISIT: CPT | Performed by: PHYSICIAN ASSISTANT

## 2022-12-22 PROCEDURE — 36591 DRAW BLOOD OFF VENOUS DEVICE: CPT

## 2022-12-22 PROCEDURE — 80053 COMPREHEN METABOLIC PANEL: CPT

## 2022-12-22 PROCEDURE — 3080F DIAST BP >= 90 MM HG: CPT | Performed by: PHYSICIAN ASSISTANT

## 2022-12-24 ENCOUNTER — APPOINTMENT (OUTPATIENT)
Dept: CT IMAGING | Facility: HOSPITAL | Age: 52
DRG: 327 | End: 2022-12-24
Attending: EMERGENCY MEDICINE
Payer: COMMERCIAL

## 2022-12-24 ENCOUNTER — APPOINTMENT (OUTPATIENT)
Dept: GENERAL RADIOLOGY | Facility: HOSPITAL | Age: 52
DRG: 327 | End: 2022-12-24
Attending: EMERGENCY MEDICINE
Payer: COMMERCIAL

## 2022-12-24 ENCOUNTER — HOSPITAL ENCOUNTER (INPATIENT)
Facility: HOSPITAL | Age: 52
LOS: 5 days | Discharge: HOME HEALTH | DRG: 327 | End: 2022-12-29
Attending: EMERGENCY MEDICINE | Admitting: HOSPITALIST
Payer: COMMERCIAL

## 2022-12-24 ENCOUNTER — HOSPITAL ENCOUNTER (INPATIENT)
Facility: HOSPITAL | Age: 52
LOS: 5 days | Discharge: HOME | DRG: 327 | End: 2022-12-29
Attending: EMERGENCY MEDICINE | Admitting: HOSPITALIST
Payer: COMMERCIAL

## 2022-12-24 DIAGNOSIS — R10.9 ABDOMINAL PAIN OF UNKNOWN ETIOLOGY: ICD-10-CM

## 2022-12-24 DIAGNOSIS — R07.9 CHEST PAIN OF UNCERTAIN ETIOLOGY: Primary | ICD-10-CM

## 2022-12-24 DIAGNOSIS — C15.9 MALIGNANT NEOPLASM OF ESOPHAGUS, UNSPECIFIED LOCATION (HCC): ICD-10-CM

## 2022-12-24 DIAGNOSIS — R11.10 INTRACTABLE VOMITING: ICD-10-CM

## 2022-12-24 DIAGNOSIS — C15.5 MALIGNANT NEOPLASM OF LOWER THIRD OF ESOPHAGUS (HCC): ICD-10-CM

## 2022-12-24 LAB
ALBUMIN SERPL-MCNC: 2.8 G/DL (ref 3.4–5)
ALBUMIN/GLOB SERPL: 0.5 {RATIO} (ref 1–2)
ALP LIVER SERPL-CCNC: 135 U/L
ALT SERPL-CCNC: 36 U/L
ANION GAP SERPL CALC-SCNC: 10 MMOL/L (ref 0–18)
AST SERPL-CCNC: 19 U/L (ref 15–37)
ATRIAL RATE: 117 BPM
BASOPHILS # BLD AUTO: 0.02 X10(3) UL (ref 0–0.2)
BASOPHILS NFR BLD AUTO: 0.2 %
BILIRUB SERPL-MCNC: 0.4 MG/DL (ref 0.1–2)
BILIRUB UR QL STRIP.AUTO: NEGATIVE
BUN BLD-MCNC: 23 MG/DL (ref 7–18)
CALCIUM BLD-MCNC: 10 MG/DL (ref 8.5–10.1)
CHLORIDE SERPL-SCNC: 104 MMOL/L (ref 98–112)
CLARITY UR REFRACT.AUTO: CLEAR
CO2 SERPL-SCNC: 24 MMOL/L (ref 21–32)
COLOR UR AUTO: YELLOW
CREAT BLD-MCNC: 0.61 MG/DL
EOSINOPHIL # BLD AUTO: 0.01 X10(3) UL (ref 0–0.7)
EOSINOPHIL NFR BLD AUTO: 0.1 %
ERYTHROCYTE [DISTWIDTH] IN BLOOD BY AUTOMATED COUNT: 17.7 %
GFR SERPLBLD BASED ON 1.73 SQ M-ARVRAT: 115 ML/MIN/1.73M2 (ref 60–?)
GLOBULIN PLAS-MCNC: 5.2 G/DL (ref 2.8–4.4)
GLUCOSE BLD-MCNC: 149 MG/DL (ref 70–99)
GLUCOSE UR STRIP.AUTO-MCNC: NEGATIVE MG/DL
HCT VFR BLD AUTO: 36.8 %
HGB BLD-MCNC: 11.4 G/DL
IMM GRANULOCYTES # BLD AUTO: 0.2 X10(3) UL (ref 0–1)
IMM GRANULOCYTES NFR BLD: 2 %
KETONES UR STRIP.AUTO-MCNC: NEGATIVE MG/DL
LEUKOCYTE ESTERASE UR QL STRIP.AUTO: NEGATIVE
LIPASE SERPL-CCNC: 93 U/L (ref 73–393)
LYMPHOCYTES # BLD AUTO: 0.67 X10(3) UL (ref 1–4)
LYMPHOCYTES NFR BLD AUTO: 6.6 %
MCH RBC QN AUTO: 27.6 PG (ref 26–34)
MCHC RBC AUTO-ENTMCNC: 31 G/DL (ref 31–37)
MCV RBC AUTO: 89.1 FL
MONOCYTES # BLD AUTO: 0.59 X10(3) UL (ref 0.1–1)
MONOCYTES NFR BLD AUTO: 5.8 %
NEUTROPHILS # BLD AUTO: 8.62 X10 (3) UL (ref 1.5–7.7)
NEUTROPHILS # BLD AUTO: 8.62 X10(3) UL (ref 1.5–7.7)
NEUTROPHILS NFR BLD AUTO: 85.3 %
NITRITE UR QL STRIP.AUTO: NEGATIVE
OSMOLALITY SERPL CALC.SUM OF ELEC: 292 MOSM/KG (ref 275–295)
P AXIS: -4 DEGREES
P-R INTERVAL: 128 MS
PH UR STRIP.AUTO: 5.5 [PH] (ref 5–8)
PLATELET # BLD AUTO: 369 10(3)UL (ref 150–450)
POTASSIUM SERPL-SCNC: 3.7 MMOL/L (ref 3.5–5.1)
PROT SERPL-MCNC: 8 G/DL (ref 6.4–8.2)
PROT UR STRIP.AUTO-MCNC: NEGATIVE MG/DL
Q-T INTERVAL: 318 MS
QRS DURATION: 84 MS
QTC CALCULATION (BEZET): 443 MS
R AXIS: 28 DEGREES
RBC # BLD AUTO: 4.13 X10(6)UL
SARS-COV-2 RNA RESP QL NAA+PROBE: NOT DETECTED
SODIUM SERPL-SCNC: 138 MMOL/L (ref 136–145)
SP GR UR STRIP.AUTO: 1.02 (ref 1–1.03)
T AXIS: -7 DEGREES
TROPONIN I HIGH SENSITIVITY: 10 NG/L
TROPONIN I HIGH SENSITIVITY: 8 NG/L
UROBILINOGEN UR STRIP.AUTO-MCNC: 0.2 MG/DL
VENTRICULAR RATE: 117 BPM
WBC # BLD AUTO: 10.1 X10(3) UL (ref 4–11)

## 2022-12-24 PROCEDURE — 74177 CT ABD & PELVIS W/CONTRAST: CPT | Performed by: EMERGENCY MEDICINE

## 2022-12-24 PROCEDURE — 71045 X-RAY EXAM CHEST 1 VIEW: CPT | Performed by: EMERGENCY MEDICINE

## 2022-12-24 PROCEDURE — 99223 1ST HOSP IP/OBS HIGH 75: CPT | Performed by: HOSPITALIST

## 2022-12-24 PROCEDURE — 71275 CT ANGIOGRAPHY CHEST: CPT | Performed by: EMERGENCY MEDICINE

## 2022-12-24 RX ORDER — SENNOSIDES 8.6 MG
17.2 TABLET ORAL NIGHTLY PRN
Status: DISCONTINUED | OUTPATIENT
Start: 2022-12-24 | End: 2022-12-29

## 2022-12-24 RX ORDER — METOPROLOL SUCCINATE 50 MG/1
50 TABLET, EXTENDED RELEASE ORAL DAILY
COMMUNITY

## 2022-12-24 RX ORDER — HYDROMORPHONE HYDROCHLORIDE 1 MG/ML
1 INJECTION, SOLUTION INTRAMUSCULAR; INTRAVENOUS; SUBCUTANEOUS EVERY 30 MIN PRN
Status: DISCONTINUED | OUTPATIENT
Start: 2022-12-24 | End: 2022-12-26

## 2022-12-24 RX ORDER — HYDROMORPHONE HYDROCHLORIDE 1 MG/ML
0.5 INJECTION, SOLUTION INTRAMUSCULAR; INTRAVENOUS; SUBCUTANEOUS EVERY 30 MIN PRN
Status: ACTIVE | OUTPATIENT
Start: 2022-12-24 | End: 2022-12-24

## 2022-12-24 RX ORDER — PANTOPRAZOLE SODIUM 40 MG/1
40 TABLET, DELAYED RELEASE ORAL
Status: ON HOLD | COMMUNITY
End: 2022-12-24 | Stop reason: CLARIF

## 2022-12-24 RX ORDER — OXYCODONE HCL 5 MG/5 ML
5 SOLUTION, ORAL ORAL EVERY 8 HOURS PRN
Status: ON HOLD | COMMUNITY
End: 2022-12-29

## 2022-12-24 RX ORDER — HYDROMORPHONE HYDROCHLORIDE 1 MG/ML
1 INJECTION, SOLUTION INTRAMUSCULAR; INTRAVENOUS; SUBCUTANEOUS ONCE
Status: COMPLETED | OUTPATIENT
Start: 2022-12-24 | End: 2022-12-24

## 2022-12-24 RX ORDER — PROCHLORPERAZINE EDISYLATE 5 MG/ML
5 INJECTION INTRAMUSCULAR; INTRAVENOUS EVERY 8 HOURS PRN
Status: DISCONTINUED | OUTPATIENT
Start: 2022-12-24 | End: 2022-12-29

## 2022-12-24 RX ORDER — ATORVASTATIN CALCIUM 20 MG/1
20 TABLET, FILM COATED ORAL NIGHTLY
COMMUNITY

## 2022-12-24 RX ORDER — MORPHINE SULFATE 2 MG/ML
2 INJECTION, SOLUTION INTRAMUSCULAR; INTRAVENOUS EVERY 2 HOUR PRN
Status: DISCONTINUED | OUTPATIENT
Start: 2022-12-24 | End: 2022-12-26

## 2022-12-24 RX ORDER — MORPHINE SULFATE 2 MG/ML
1 INJECTION, SOLUTION INTRAMUSCULAR; INTRAVENOUS EVERY 2 HOUR PRN
Status: DISCONTINUED | OUTPATIENT
Start: 2022-12-24 | End: 2022-12-26

## 2022-12-24 RX ORDER — SODIUM CHLORIDE 9 MG/ML
INJECTION, SOLUTION INTRAVENOUS CONTINUOUS
Status: DISCONTINUED | OUTPATIENT
Start: 2022-12-24 | End: 2022-12-24

## 2022-12-24 RX ORDER — DEXTROSE AND SODIUM CHLORIDE 5; .9 G/100ML; G/100ML
INJECTION, SOLUTION INTRAVENOUS CONTINUOUS
Status: DISCONTINUED | OUTPATIENT
Start: 2022-12-24 | End: 2022-12-29

## 2022-12-24 RX ORDER — ONDANSETRON 2 MG/ML
4 INJECTION INTRAMUSCULAR; INTRAVENOUS EVERY 4 HOURS PRN
Status: DISCONTINUED | OUTPATIENT
Start: 2022-12-24 | End: 2022-12-24

## 2022-12-24 RX ORDER — ONDANSETRON 2 MG/ML
4 INJECTION INTRAMUSCULAR; INTRAVENOUS ONCE
Status: COMPLETED | OUTPATIENT
Start: 2022-12-24 | End: 2022-12-24

## 2022-12-24 RX ORDER — LABETALOL HYDROCHLORIDE 5 MG/ML
20 INJECTION, SOLUTION INTRAVENOUS ONCE
Status: COMPLETED | OUTPATIENT
Start: 2022-12-24 | End: 2022-12-24

## 2022-12-24 RX ORDER — ACETAMINOPHEN 500 MG
500 TABLET ORAL EVERY 4 HOURS PRN
Status: DISCONTINUED | OUTPATIENT
Start: 2022-12-24 | End: 2022-12-29

## 2022-12-24 RX ORDER — MELATONIN
3 NIGHTLY PRN
Status: DISCONTINUED | OUTPATIENT
Start: 2022-12-24 | End: 2022-12-29

## 2022-12-24 RX ORDER — SODIUM PHOSPHATE, DIBASIC AND SODIUM PHOSPHATE, MONOBASIC 7; 19 G/133ML; G/133ML
1 ENEMA RECTAL ONCE AS NEEDED
Status: DISCONTINUED | OUTPATIENT
Start: 2022-12-24 | End: 2022-12-29

## 2022-12-24 RX ORDER — POLYETHYLENE GLYCOL 3350 17 G/17G
17 POWDER, FOR SOLUTION ORAL DAILY PRN
Status: DISCONTINUED | OUTPATIENT
Start: 2022-12-24 | End: 2022-12-29

## 2022-12-24 RX ORDER — MORPHINE SULFATE 4 MG/ML
4 INJECTION, SOLUTION INTRAMUSCULAR; INTRAVENOUS EVERY 2 HOUR PRN
Status: DISCONTINUED | OUTPATIENT
Start: 2022-12-24 | End: 2022-12-26

## 2022-12-24 RX ORDER — LABETALOL HYDROCHLORIDE 5 MG/ML
10 INJECTION, SOLUTION INTRAVENOUS EVERY 4 HOURS PRN
Status: DISCONTINUED | OUTPATIENT
Start: 2022-12-24 | End: 2022-12-25

## 2022-12-24 RX ORDER — ONDANSETRON 2 MG/ML
4 INJECTION INTRAMUSCULAR; INTRAVENOUS EVERY 6 HOURS PRN
Status: DISCONTINUED | OUTPATIENT
Start: 2022-12-24 | End: 2022-12-26

## 2022-12-24 RX ORDER — ENOXAPARIN SODIUM 100 MG/ML
40 INJECTION SUBCUTANEOUS DAILY
Status: DISCONTINUED | OUTPATIENT
Start: 2022-12-24 | End: 2022-12-25

## 2022-12-24 RX ORDER — BISACODYL 10 MG
10 SUPPOSITORY, RECTAL RECTAL
Status: DISCONTINUED | OUTPATIENT
Start: 2022-12-24 | End: 2022-12-29

## 2022-12-24 NOTE — ED QUICK NOTES
Orders for admission, patient is aware of plan and ready to go upstairs. Any questions, please call ED RN Isa at extension [phone].      Patient Covid vaccination status: Fully vaccinated     COVID Test Ordered in ED: Rapid SARS-CoV-2 by PCR    COVID Suspicion at Admission: N/A    Running Infusions:  None    Mental Status/LOC at time of transport: A/Ox4    Other pertinent information:   CIWA score: N/A   NIH score:  N/A

## 2022-12-24 NOTE — ED INITIAL ASSESSMENT (HPI)
Pt reports vomiting for weeks, pt reports he has chemo on Tuesday. Chest pain since this morning with shortness of breath. Hx of CA pt states it is too painful to speak would like to finish assessment with his girlfriend answering questions.

## 2022-12-25 LAB
ALBUMIN SERPL-MCNC: 2.5 G/DL (ref 3.4–5)
ALBUMIN/GLOB SERPL: 0.6 {RATIO} (ref 1–2)
ALP LIVER SERPL-CCNC: 109 U/L
ALT SERPL-CCNC: 30 U/L
ANION GAP SERPL CALC-SCNC: 4 MMOL/L (ref 0–18)
AST SERPL-CCNC: 17 U/L (ref 15–37)
BASOPHILS # BLD AUTO: 0.04 X10(3) UL (ref 0–0.2)
BASOPHILS NFR BLD AUTO: 0.5 %
BILIRUB SERPL-MCNC: 0.3 MG/DL (ref 0.1–2)
BUN BLD-MCNC: 15 MG/DL (ref 7–18)
CALCIUM BLD-MCNC: 8.9 MG/DL (ref 8.5–10.1)
CHLORIDE SERPL-SCNC: 106 MMOL/L (ref 98–112)
CO2 SERPL-SCNC: 28 MMOL/L (ref 21–32)
CREAT BLD-MCNC: 0.57 MG/DL
EOSINOPHIL # BLD AUTO: 0.03 X10(3) UL (ref 0–0.7)
EOSINOPHIL NFR BLD AUTO: 0.3 %
ERYTHROCYTE [DISTWIDTH] IN BLOOD BY AUTOMATED COUNT: 18.2 %
GFR SERPLBLD BASED ON 1.73 SQ M-ARVRAT: 117 ML/MIN/1.73M2 (ref 60–?)
GLOBULIN PLAS-MCNC: 3.9 G/DL (ref 2.8–4.4)
GLUCOSE BLD-MCNC: 119 MG/DL (ref 70–99)
GLUCOSE BLD-MCNC: 139 MG/DL (ref 70–99)
GLUCOSE BLD-MCNC: 146 MG/DL (ref 70–99)
GLUCOSE BLD-MCNC: 152 MG/DL (ref 70–99)
GLUCOSE BLD-MCNC: 95 MG/DL (ref 70–99)
HCT VFR BLD AUTO: 33.3 %
HGB BLD-MCNC: 10 G/DL
IMM GRANULOCYTES # BLD AUTO: 0.16 X10(3) UL (ref 0–1)
IMM GRANULOCYTES NFR BLD: 1.8 %
LYMPHOCYTES # BLD AUTO: 0.81 X10(3) UL (ref 1–4)
LYMPHOCYTES NFR BLD AUTO: 9.3 %
MAGNESIUM SERPL-MCNC: 1.9 MG/DL (ref 1.6–2.6)
MCH RBC QN AUTO: 27.2 PG (ref 26–34)
MCHC RBC AUTO-ENTMCNC: 30 G/DL (ref 31–37)
MCV RBC AUTO: 90.7 FL
MONOCYTES # BLD AUTO: 0.94 X10(3) UL (ref 0.1–1)
MONOCYTES NFR BLD AUTO: 10.8 %
NEUTROPHILS # BLD AUTO: 6.69 X10 (3) UL (ref 1.5–7.7)
NEUTROPHILS # BLD AUTO: 6.69 X10(3) UL (ref 1.5–7.7)
NEUTROPHILS NFR BLD AUTO: 77.3 %
OSMOLALITY SERPL CALC.SUM OF ELEC: 289 MOSM/KG (ref 275–295)
PHOSPHATE SERPL-MCNC: 3.7 MG/DL (ref 2.5–4.9)
PLATELET # BLD AUTO: 333 10(3)UL (ref 150–450)
POTASSIUM SERPL-SCNC: 3.7 MMOL/L (ref 3.5–5.1)
PROT SERPL-MCNC: 6.4 G/DL (ref 6.4–8.2)
RBC # BLD AUTO: 3.67 X10(6)UL
SODIUM SERPL-SCNC: 138 MMOL/L (ref 136–145)
TROPONIN I HIGH SENSITIVITY: 10 NG/L
WBC # BLD AUTO: 8.7 X10(3) UL (ref 4–11)

## 2022-12-25 PROCEDURE — 3E0336Z INTRODUCTION OF NUTRITIONAL SUBSTANCE INTO PERIPHERAL VEIN, PERCUTANEOUS APPROACH: ICD-10-PCS | Performed by: HOSPITALIST

## 2022-12-25 PROCEDURE — 99233 SBSQ HOSP IP/OBS HIGH 50: CPT | Performed by: HOSPITALIST

## 2022-12-25 RX ORDER — LABETALOL HYDROCHLORIDE 5 MG/ML
10 INJECTION, SOLUTION INTRAVENOUS EVERY 4 HOURS PRN
Status: DISCONTINUED | OUTPATIENT
Start: 2022-12-25 | End: 2022-12-26

## 2022-12-25 RX ORDER — METOCLOPRAMIDE HYDROCHLORIDE 5 MG/ML
10 INJECTION INTRAMUSCULAR; INTRAVENOUS EVERY 6 HOURS PRN
Status: DISCONTINUED | OUTPATIENT
Start: 2022-12-25 | End: 2022-12-29

## 2022-12-25 RX ORDER — METOCLOPRAMIDE 10 MG/1
10 TABLET ORAL EVERY 6 HOURS PRN
Status: DISCONTINUED | OUTPATIENT
Start: 2022-12-25 | End: 2022-12-29

## 2022-12-25 RX ORDER — ENOXAPARIN SODIUM 100 MG/ML
40 INJECTION SUBCUTANEOUS NIGHTLY
Status: DISCONTINUED | OUTPATIENT
Start: 2022-12-25 | End: 2022-12-29

## 2022-12-25 NOTE — PLAN OF CARE
Problem: GASTROINTESTINAL - ADULT  Goal: Minimal or absence of nausea and vomiting  Description: INTERVENTIONS:  - Maintain adequate hydration with IV or PO as ordered and tolerated  - Nasogastric tube to low intermittent suction as ordered  - Evaluate effectiveness of ordered antiemetic medications  - Provide nonpharmacologic comfort measures as appropriate  - Advance diet as tolerated, if ordered  - Obtain nutritional consult as needed  - Evaluate fluid balance  Outcome: Progressing  Goal: Maintains or returns to baseline bowel function  Description: INTERVENTIONS:  - Assess bowel function  - Maintain adequate hydration with IV or PO as ordered and tolerated  - Evaluate effectiveness of GI medications  - Encourage mobilization and activity  - Obtain nutritional consult as needed  - Establish a toileting routine/schedule  - Consider collaborating with pharmacy to review patient's medication profile  Outcome: Progressing  Goal: Maintains adequate nutritional intake (undernourished)  Description: INTERVENTIONS:  - Monitor percentage of each meal consumed  - Identify factors contributing to decreased intake, treat as appropriate  - Assist with meals as needed  - Monitor I&O, WT and lab values  - Obtain nutritional consult as needed  - Optimize oral hygiene and moisture  - Encourage food from home; allow for food preferences  - Enhance eating environment  Outcome: Progressing     Problem: CARDIOVASCULAR - ADULT  Goal: Maintains optimal cardiac output and hemodynamic stability  Description: INTERVENTIONS:  - Monitor vital signs, rhythm, and trends  - Monitor for bleeding, hypotension and signs of decreased cardiac output  - Evaluate effectiveness of vasoactive medications to optimize hemodynamic stability  - Monitor arterial and/or venous puncture sites for bleeding and/or hematoma  - Assess quality of pulses, skin color and temperature  - Assess for signs of decreased coronary artery perfusion - ex.  Angina  - Evaluate fluid balance, assess for edema, trend weights  Outcome: Progressing  Goal: Absence of cardiac arrhythmias or at baseline  Description: INTERVENTIONS:  - Continuous cardiac monitoring, monitor vital signs, obtain 12 lead EKG if indicated  - Evaluate effectiveness of antiarrhythmic and heart rate control medications as ordered  - Initiate emergency measures for life threatening arrhythmias  - Monitor electrolytes and administer replacement therapy as ordered  Outcome: Progressing  Received in bed. Pt A&Ox4. Denies any sob; c/o epigastric pain and nausea; last pain med givenat 1417 today. Fall precautions continued. Pt currently on clear liquid diet, no vomiting noted. IVF infusing as ordered. Will continue to monitor. Labs and meds as ordered. Up to chair tid for meals. Accuchecks qid; continue clear liquid diet and antiemetics and pain meds as ordered prn. TPN at bedtime as ordered. Continue IVF. NPO after midnight on Monday for GI procedure on Tuesday.

## 2022-12-25 NOTE — PROGRESS NOTES
12/24/22 1820 12/24/22 1825 12/24/22 1827   Vital Signs   BP (!) 162/80 (!) 165/99 (!) 141/99   MAP (mmHg) 101 115 108   BP Location Right arm Right arm Right arm   BP Method Automatic Automatic Automatic   Patient Position Lying Sitting Standing

## 2022-12-25 NOTE — PROGRESS NOTES
Brief GI note:    Discussed case with Dr. Lexus Raines. Plan to perform both pyloromyotomy (G-POEM) for gastroparesis treatment and flex sig for migrated stent removal on Tuesday. Will cancel flex sig for tomorrow. Continue anti-nausea regimen. Clears as tolerated today.        [address] [facility]  Gastroenterology  [facility] Gastroenterology

## 2022-12-25 NOTE — PLAN OF CARE
Received patient at 0730. Alert and Oriented x4. Tele Rhythm NSR. O2 saturation 96% On room air. Breath sounds clear. Bed is locked and in low position. Call light and personal items within reach. Pt voiding with no issue per urinal. Pt up with SBA, tolerating well. Pain to abdomen with relief with 2mg Morphine IV overnight, needed 4 mg this am. Alternating Zofran and Compazine for nausea. Plan for GI to see, pt NPO for now. TPN to start this phi per nutrition consult. Reviewed plan of care and patient verbalizes understanding.       Problem: Patient/Family Goals  Goal: Patient/Family Long Term Goal  Description: Patient's Long Term Goal: treat cancer    Interventions:  - follow up with oncology  -report new or worsening symptoms to healthcare team    - See additional Care Plan goals for specific interventions  Outcome: Progressing  Goal: Patient/Family Short Term Goal  Description: Patient's Short Term Goal: relief of nausea, pain management    Interventions:   - PRN zofran, compazine  -PRN morphine  -IV fluids  -GI consult  - See additional Care Plan goals for specific interventions  Outcome: Progressing     Problem: GASTROINTESTINAL - ADULT  Goal: Minimal or absence of nausea and vomiting  Description: INTERVENTIONS:  - Maintain adequate hydration with IV or PO as ordered and tolerated  - Nasogastric tube to low intermittent suction as ordered  - Evaluate effectiveness of ordered antiemetic medications  - Provide nonpharmacologic comfort measures as appropriate  - Advance diet as tolerated, if ordered  - Obtain nutritional consult as needed  - Evaluate fluid balance  Outcome: Progressing  Goal: Maintains or returns to baseline bowel function  Description: INTERVENTIONS:  - Assess bowel function  - Maintain adequate hydration with IV or PO as ordered and tolerated  - Evaluate effectiveness of GI medications  - Encourage mobilization and activity  - Obtain nutritional consult as needed  - Establish a toileting routine/schedule  - Consider collaborating with pharmacy to review patient's medication profile  Outcome: Progressing  Goal: Maintains adequate nutritional intake (undernourished)  Description: INTERVENTIONS:  - Monitor percentage of each meal consumed  - Identify factors contributing to decreased intake, treat as appropriate  - Assist with meals as needed  - Monitor I&O, WT and lab values  - Obtain nutritional consult as needed  - Optimize oral hygiene and moisture  - Encourage food from home; allow for food preferences  - Enhance eating environment  Outcome: Progressing     Problem: CARDIOVASCULAR - ADULT  Goal: Maintains optimal cardiac output and hemodynamic stability  Description: INTERVENTIONS:  - Monitor vital signs, rhythm, and trends  - Monitor for bleeding, hypotension and signs of decreased cardiac output  - Evaluate effectiveness of vasoactive medications to optimize hemodynamic stability  - Monitor arterial and/or venous puncture sites for bleeding and/or hematoma  - Assess quality of pulses, skin color and temperature  - Assess for signs of decreased coronary artery perfusion - ex.  Angina  - Evaluate fluid balance, assess for edema, trend weights  Outcome: Progressing  Goal: Absence of cardiac arrhythmias or at baseline  Description: INTERVENTIONS:  - Continuous cardiac monitoring, monitor vital signs, obtain 12 lead EKG if indicated  - Evaluate effectiveness of antiarrhythmic and heart rate control medications as ordered  - Initiate emergency measures for life threatening arrhythmias  - Monitor electrolytes and administer replacement therapy as ordered  Outcome: Progressing

## 2022-12-25 NOTE — CM/SW NOTE
SW consult received, pt is current with [facility] and [facility] for TPN. Unit SW/CM to remain available for dc recommendations and planning.      BENY Dumont  Discharge [facility]

## 2022-12-25 NOTE — PLAN OF CARE
Patient admitted via Wheelchair at [phone] upon admission from ED. Oriented to room. Safety precautions initiated. Bed in low position. Call light in reach. Patient alert, oriented x4. Oxygen saturation greater than 89% on room air. Tele: NSR. IV fluids per order. Continent. Reporting nausea. Rating pain 8/10. Pt reporting TPN at home, MD notified. Plan of care: IV fluids per order, GI to see, monitor trops, PRN labetalol for SBP>170, echo. Pt and family updated on plan of care. Questions answered.

## 2022-12-25 NOTE — PLAN OF CARE
Assumed care of pt @ 1100. AOx4. O2 sats maintained on RA. NSR on tele. Epigastric pain managed with PRN morphine and antiemetics. Heme and pulm consults notified. Pt updated on plan of care.

## 2022-12-25 NOTE — PLAN OF CARE
Patient AOX4. O2 sat > 90% on room air. NSR on tele, tachycardic at times. VSS. Complains of severe upper abdominal pain, controlled with PRN IV Morphine. Patient having frequent episodes of nausea with thin yellow emesis, PRN Zofran and Compazine administered with relief. NPO until seen by GI consult. Patient receives TPN at home, dietary to see today in order to get TPN set up here. Port to L chest accessed, site CDI. Plan for 2D echo today. Cardiology following. Patient updated on plan of care.      Problem: Patient/Family Goals  Goal: Patient/Family Long Term Goal  Description: Patient's Long Term Goal: treat cancer    Interventions:  - follow up with oncology  -report new or worsening symptoms to healthcare team    - See additional Care Plan goals for specific interventions  Outcome: Progressing  Goal: Patient/Family Short Term Goal  Description: Patient's Short Term Goal: relief of nausea, pain management    Interventions:   - PRN zofran, compazine  -PRN morphine  -IV fluids  -GI consult  - See additional Care Plan goals for specific interventions  Outcome: Progressing     Problem: GASTROINTESTINAL - ADULT  Goal: Minimal or absence of nausea and vomiting  Description: INTERVENTIONS:  - Maintain adequate hydration with IV or PO as ordered and tolerated  - Nasogastric tube to low intermittent suction as ordered  - Evaluate effectiveness of ordered antiemetic medications  - Provide nonpharmacologic comfort measures as appropriate  - Advance diet as tolerated, if ordered  - Obtain nutritional consult as needed  - Evaluate fluid balance  Outcome: Progressing  Goal: Maintains or returns to baseline bowel function  Description: INTERVENTIONS:  - Assess bowel function  - Maintain adequate hydration with IV or PO as ordered and tolerated  - Evaluate effectiveness of GI medications  - Encourage mobilization and activity  - Obtain nutritional consult as needed  - Establish a toileting routine/schedule  - Consider collaborating with pharmacy to review patient's medication profile  Outcome: Progressing  Goal: Maintains adequate nutritional intake (undernourished)  Description: INTERVENTIONS:  - Monitor percentage of each meal consumed  - Identify factors contributing to decreased intake, treat as appropriate  - Assist with meals as needed  - Monitor I&O, WT and lab values  - Obtain nutritional consult as needed  - Optimize oral hygiene and moisture  - Encourage food from home; allow for food preferences  - Enhance eating environment  Outcome: Progressing     Problem: CARDIOVASCULAR - ADULT  Goal: Maintains optimal cardiac output and hemodynamic stability  Description: INTERVENTIONS:  - Monitor vital signs, rhythm, and trends  - Monitor for bleeding, hypotension and signs of decreased cardiac output  - Evaluate effectiveness of vasoactive medications to optimize hemodynamic stability  - Monitor arterial and/or venous puncture sites for bleeding and/or hematoma  - Assess quality of pulses, skin color and temperature  - Assess for signs of decreased coronary artery perfusion - ex.  Angina  - Evaluate fluid balance, assess for edema, trend weights  Outcome: Progressing  Goal: Absence of cardiac arrhythmias or at baseline  Description: INTERVENTIONS:  - Continuous cardiac monitoring, monitor vital signs, obtain 12 lead EKG if indicated  - Evaluate effectiveness of antiarrhythmic and heart rate control medications as ordered  - Initiate emergency measures for life threatening arrhythmias  - Monitor electrolytes and administer replacement therapy as ordered  Outcome: Progressing

## 2022-12-26 ENCOUNTER — APPOINTMENT (OUTPATIENT)
Dept: CV DIAGNOSTICS | Facility: HOSPITAL | Age: 52
DRG: 327 | End: 2022-12-26
Attending: NURSE PRACTITIONER
Payer: COMMERCIAL

## 2022-12-26 LAB
ALBUMIN SERPL-MCNC: 2.4 G/DL (ref 3.4–5)
ALBUMIN/GLOB SERPL: 0.6 {RATIO} (ref 1–2)
ALP LIVER SERPL-CCNC: 109 U/L
ALT SERPL-CCNC: 31 U/L
ANION GAP SERPL CALC-SCNC: 5 MMOL/L (ref 0–18)
AST SERPL-CCNC: 21 U/L (ref 15–37)
BILIRUB SERPL-MCNC: 0.4 MG/DL (ref 0.1–2)
BUN BLD-MCNC: 14 MG/DL (ref 7–18)
CALCIUM BLD-MCNC: 8.7 MG/DL (ref 8.5–10.1)
CHLORIDE SERPL-SCNC: 103 MMOL/L (ref 98–112)
CO2 SERPL-SCNC: 30 MMOL/L (ref 21–32)
CREAT BLD-MCNC: 0.4 MG/DL
DEPRECATED HBV CORE AB SER IA-ACNC: 214.6 NG/ML
GFR SERPLBLD BASED ON 1.73 SQ M-ARVRAT: 130 ML/MIN/1.73M2 (ref 60–?)
GLOBULIN PLAS-MCNC: 3.7 G/DL (ref 2.8–4.4)
GLUCOSE BLD-MCNC: 101 MG/DL (ref 70–99)
GLUCOSE BLD-MCNC: 102 MG/DL (ref 70–99)
GLUCOSE BLD-MCNC: 103 MG/DL (ref 70–99)
GLUCOSE BLD-MCNC: 133 MG/DL (ref 70–99)
GLUCOSE BLD-MCNC: 159 MG/DL (ref 70–99)
GLUCOSE BLD-MCNC: 182 MG/DL (ref 70–99)
IRON SATN MFR SERPL: 12 %
IRON SERPL-MCNC: 28 UG/DL
MAGNESIUM SERPL-MCNC: 1.9 MG/DL (ref 1.6–2.6)
OSMOLALITY SERPL CALC.SUM OF ELEC: 287 MOSM/KG (ref 275–295)
PHOSPHATE SERPL-MCNC: 3.3 MG/DL (ref 2.5–4.9)
POTASSIUM SERPL-SCNC: 4 MMOL/L (ref 3.5–5.1)
PROT SERPL-MCNC: 6.1 G/DL (ref 6.4–8.2)
SODIUM SERPL-SCNC: 138 MMOL/L (ref 136–145)
TIBC SERPL-MCNC: 234 UG/DL (ref 240–450)
TRANSFERRIN SERPL-MCNC: 157 MG/DL (ref 200–360)
TRIGL SERPL-MCNC: 116 MG/DL (ref 30–149)

## 2022-12-26 PROCEDURE — 99255 IP/OBS CONSLTJ NEW/EST HI 80: CPT | Performed by: INTERNAL MEDICINE

## 2022-12-26 PROCEDURE — 93306 TTE W/DOPPLER COMPLETE: CPT | Performed by: NURSE PRACTITIONER

## 2022-12-26 PROCEDURE — 99232 SBSQ HOSP IP/OBS MODERATE 35: CPT | Performed by: HOSPITALIST

## 2022-12-26 RX ORDER — FLUTICASONE PROPIONATE 50 MCG
1 SPRAY, SUSPENSION (ML) NASAL
Status: DISCONTINUED | OUTPATIENT
Start: 2022-12-26 | End: 2022-12-29

## 2022-12-26 RX ORDER — DIPHENHYDRAMINE HYDROCHLORIDE 50 MG/ML
12.5 INJECTION INTRAMUSCULAR; INTRAVENOUS EVERY 4 HOURS PRN
Status: DISCONTINUED | OUTPATIENT
Start: 2022-12-26 | End: 2022-12-26 | Stop reason: SDUPTHER

## 2022-12-26 RX ORDER — CETIRIZINE HYDROCHLORIDE 5 MG/1
5 TABLET ORAL DAILY
Status: DISCONTINUED | OUTPATIENT
Start: 2022-12-26 | End: 2022-12-29

## 2022-12-26 RX ORDER — DIPHENHYDRAMINE HYDROCHLORIDE 50 MG/ML
12.5 INJECTION INTRAMUSCULAR; INTRAVENOUS EVERY 4 HOURS PRN
Status: DISCONTINUED | OUTPATIENT
Start: 2022-12-26 | End: 2022-12-29

## 2022-12-26 RX ORDER — ONDANSETRON 2 MG/ML
8 INJECTION INTRAMUSCULAR; INTRAVENOUS EVERY 6 HOURS PRN
Status: DISCONTINUED | OUTPATIENT
Start: 2022-12-26 | End: 2022-12-29

## 2022-12-26 RX ORDER — DIPHENHYDRAMINE HYDROCHLORIDE 12.5 MG/5ML
12.5 SOLUTION ORAL 4 TIMES DAILY PRN
Status: DISCONTINUED | OUTPATIENT
Start: 2022-12-26 | End: 2022-12-29

## 2022-12-26 RX ORDER — DIPHENHYDRAMINE HYDROCHLORIDE 12.5 MG/5ML
12.5 SOLUTION ORAL 4 TIMES DAILY PRN
Status: DISCONTINUED | OUTPATIENT
Start: 2022-12-26 | End: 2022-12-26 | Stop reason: SDUPTHER

## 2022-12-26 RX ORDER — METOPROLOL TARTRATE 5 MG/5ML
5 INJECTION INTRAVENOUS EVERY 6 HOURS
Status: DISCONTINUED | OUTPATIENT
Start: 2022-12-26 | End: 2022-12-29

## 2022-12-26 NOTE — PLAN OF CARE
Received patient at 0730. Alert and oriented x 4. Tele rhythm shows NSR. O2 saturation adequate on RA. Pt up ad tito, uses urinal at bedside. PCA pump started this AM. TPN stopped this AM, heparin locked port. Bed is locked and in low position, call light and personal items within reach. Will continue to monitor. POC - sigmoidoscopy procedure tomorrow.      Problem: Patient/Family Goals  Goal: Patient/Family Long Term Goal  Description: Patient's Long Term Goal: treat cancer    Interventions:  - follow up with oncology  -report new or worsening symptoms to healthcare team    - See additional Care Plan goals for specific interventions  Outcome: Progressing  Goal: Patient/Family Short Term Goal  Description: Patient's Short Term Goal: relief of nausea, pain management    Interventions:   - PRN zofran, compazine  -PRN morphine  -IV fluids  -GI consult  - See additional Care Plan goals for specific interventions  Outcome: Progressing     Problem: GASTROINTESTINAL - ADULT  Goal: Minimal or absence of nausea and vomiting  Description: INTERVENTIONS:  - Maintain adequate hydration with IV or PO as ordered and tolerated  - Nasogastric tube to low intermittent suction as ordered  - Evaluate effectiveness of ordered antiemetic medications  - Provide nonpharmacologic comfort measures as appropriate  - Advance diet as tolerated, if ordered  - Obtain nutritional consult as needed  - Evaluate fluid balance  Outcome: Progressing  Goal: Maintains or returns to baseline bowel function  Description: INTERVENTIONS:  - Assess bowel function  - Maintain adequate hydration with IV or PO as ordered and tolerated  - Evaluate effectiveness of GI medications  - Encourage mobilization and activity  - Obtain nutritional consult as needed  - Establish a toileting routine/schedule  - Consider collaborating with pharmacy to review patient's medication profile  Outcome: Progressing  Goal: Maintains adequate nutritional intake (undernourished)  Description: INTERVENTIONS:  - Monitor percentage of each meal consumed  - Identify factors contributing to decreased intake, treat as appropriate  - Assist with meals as needed  - Monitor I&O, WT and lab values  - Obtain nutritional consult as needed  - Optimize oral hygiene and moisture  - Encourage food from home; allow for food preferences  - Enhance eating environment  Outcome: Progressing     Problem: CARDIOVASCULAR - ADULT  Goal: Maintains optimal cardiac output and hemodynamic stability  Description: INTERVENTIONS:  - Monitor vital signs, rhythm, and trends  - Monitor for bleeding, hypotension and signs of decreased cardiac output  - Evaluate effectiveness of vasoactive medications to optimize hemodynamic stability  - Monitor arterial and/or venous puncture sites for bleeding and/or hematoma  - Assess quality of pulses, skin color and temperature  - Assess for signs of decreased coronary artery perfusion - ex.  Angina  - Evaluate fluid balance, assess for edema, trend weights  Outcome: Progressing  Goal: Absence of cardiac arrhythmias or at baseline  Description: INTERVENTIONS:  - Continuous cardiac monitoring, monitor vital signs, obtain 12 lead EKG if indicated  - Evaluate effectiveness of antiarrhythmic and heart rate control medications as ordered  - Initiate emergency measures for life threatening arrhythmias  - Monitor electrolytes and administer replacement therapy as ordered  Outcome: Progressing

## 2022-12-26 NOTE — PLAN OF CARE
Assumed care of patient at 1. Patient is alert and oriented x4. On room air with adequate O2 saturations. NSR/ST on tele. Continent of bowels and bladder. C/o epigastric pain and nausea, relieved with PRN morphine and antiemetics (see MAR). Bed locked and in lowest position, call light within reach. Up SBA. Needs met at this time.     Plan: flex sigmoidoscopy and stent fix on Tuesday    Problem: Patient/Family Goals  Goal: Patient/Family Long Term Goal  Description: Patient's Long Term Goal: treat cancer    Interventions:  - follow up with oncology  -report new or worsening symptoms to healthcare team    - See additional Care Plan goals for specific interventions  Outcome: Progressing  Goal: Patient/Family Short Term Goal  Description: Patient's Short Term Goal: relief of nausea, pain management    Interventions:   - PRN zofran, compazine  -PRN morphine  -IV fluids  -GI consult  - See additional Care Plan goals for specific interventions  Outcome: Progressing     Problem: GASTROINTESTINAL - ADULT  Goal: Minimal or absence of nausea and vomiting  Description: INTERVENTIONS:  - Maintain adequate hydration with IV or PO as ordered and tolerated  - Nasogastric tube to low intermittent suction as ordered  - Evaluate effectiveness of ordered antiemetic medications  - Provide nonpharmacologic comfort measures as appropriate  - Advance diet as tolerated, if ordered  - Obtain nutritional consult as needed  - Evaluate fluid balance  Outcome: Progressing  Goal: Maintains or returns to baseline bowel function  Description: INTERVENTIONS:  - Assess bowel function  - Maintain adequate hydration with IV or PO as ordered and tolerated  - Evaluate effectiveness of GI medications  - Encourage mobilization and activity  - Obtain nutritional consult as needed  - Establish a toileting routine/schedule  - Consider collaborating with pharmacy to review patient's medication profile  Outcome: Progressing  Goal: Maintains adequate nutritional intake (undernourished)  Description: INTERVENTIONS:  - Monitor percentage of each meal consumed  - Identify factors contributing to decreased intake, treat as appropriate  - Assist with meals as needed  - Monitor I&O, WT and lab values  - Obtain nutritional consult as needed  - Optimize oral hygiene and moisture  - Encourage food from home; allow for food preferences  - Enhance eating environment  Outcome: Progressing     Problem: CARDIOVASCULAR - ADULT  Goal: Maintains optimal cardiac output and hemodynamic stability  Description: INTERVENTIONS:  - Monitor vital signs, rhythm, and trends  - Monitor for bleeding, hypotension and signs of decreased cardiac output  - Evaluate effectiveness of vasoactive medications to optimize hemodynamic stability  - Monitor arterial and/or venous puncture sites for bleeding and/or hematoma  - Assess quality of pulses, skin color and temperature  - Assess for signs of decreased coronary artery perfusion - ex.  Angina  - Evaluate fluid balance, assess for edema, trend weights  Outcome: Progressing  Goal: Absence of cardiac arrhythmias or at baseline  Description: INTERVENTIONS:  - Continuous cardiac monitoring, monitor vital signs, obtain 12 lead EKG if indicated  - Evaluate effectiveness of antiarrhythmic and heart rate control medications as ordered  - Initiate emergency measures for life threatening arrhythmias  - Monitor electrolytes and administer replacement therapy as ordered  Outcome: Progressing

## 2022-12-27 ENCOUNTER — ANESTHESIA EVENT (OUTPATIENT)
Dept: ENDOSCOPY | Facility: HOSPITAL | Age: 52
DRG: 327 | End: 2022-12-27
Payer: COMMERCIAL

## 2022-12-27 ENCOUNTER — APPOINTMENT (OUTPATIENT)
Dept: HEMATOLOGY/ONCOLOGY | Age: 52
End: 2022-12-27
Attending: INTERNAL MEDICINE
Payer: COMMERCIAL

## 2022-12-27 ENCOUNTER — ANESTHESIA (OUTPATIENT)
Dept: ENDOSCOPY | Facility: HOSPITAL | Age: 52
DRG: 327 | End: 2022-12-27
Payer: COMMERCIAL

## 2022-12-27 LAB
ANION GAP SERPL CALC-SCNC: 3 MMOL/L (ref 0–18)
BUN BLD-MCNC: 17 MG/DL (ref 7–18)
CALCIUM BLD-MCNC: 8.7 MG/DL (ref 8.5–10.1)
CHLORIDE SERPL-SCNC: 104 MMOL/L (ref 98–112)
CO2 SERPL-SCNC: 30 MMOL/L (ref 21–32)
CREAT BLD-MCNC: 0.46 MG/DL
GFR SERPLBLD BASED ON 1.73 SQ M-ARVRAT: 125 ML/MIN/1.73M2 (ref 60–?)
GLUCOSE BLD-MCNC: 121 MG/DL (ref 70–99)
GLUCOSE BLD-MCNC: 165 MG/DL (ref 70–99)
GLUCOSE BLD-MCNC: 91 MG/DL (ref 70–99)
GLUCOSE BLD-MCNC: 98 MG/DL (ref 70–99)
MAGNESIUM SERPL-MCNC: 1.9 MG/DL (ref 1.6–2.6)
OSMOLALITY SERPL CALC.SUM OF ELEC: 287 MOSM/KG (ref 275–295)
PHOSPHATE SERPL-MCNC: 3.7 MG/DL (ref 2.5–4.9)
POTASSIUM SERPL-SCNC: 4 MMOL/L (ref 3.5–5.1)
SODIUM SERPL-SCNC: 137 MMOL/L (ref 136–145)

## 2022-12-27 PROCEDURE — 99232 SBSQ HOSP IP/OBS MODERATE 35: CPT | Performed by: HOSPITALIST

## 2022-12-27 PROCEDURE — 0DCN8ZZ EXTIRPATION OF MATTER FROM SIGMOID COLON, VIA NATURAL OR ARTIFICIAL OPENING ENDOSCOPIC: ICD-10-PCS | Performed by: INTERNAL MEDICINE

## 2022-12-27 PROCEDURE — 0D878ZZ DIVISION OF STOMACH, PYLORUS, VIA NATURAL OR ARTIFICIAL OPENING ENDOSCOPIC: ICD-10-PCS | Performed by: INTERNAL MEDICINE

## 2022-12-27 RX ORDER — ACETAMINOPHEN 500 MG
1000 TABLET ORAL ONCE AS NEEDED
Status: DISCONTINUED | OUTPATIENT
Start: 2022-12-27 | End: 2022-12-27 | Stop reason: HOSPADM

## 2022-12-27 RX ORDER — MEPERIDINE HYDROCHLORIDE 25 MG/ML
12.5 INJECTION INTRAMUSCULAR; INTRAVENOUS; SUBCUTANEOUS AS NEEDED
Status: DISCONTINUED | OUTPATIENT
Start: 2022-12-27 | End: 2022-12-27 | Stop reason: HOSPADM

## 2022-12-27 RX ORDER — LEVOFLOXACIN 5 MG/ML
750 INJECTION, SOLUTION INTRAVENOUS DAILY
Status: DISCONTINUED | OUTPATIENT
Start: 2022-12-27 | End: 2022-12-29

## 2022-12-27 RX ORDER — PROCHLORPERAZINE EDISYLATE 5 MG/ML
5 INJECTION INTRAMUSCULAR; INTRAVENOUS EVERY 8 HOURS PRN
Status: DISCONTINUED | OUTPATIENT
Start: 2022-12-27 | End: 2022-12-27 | Stop reason: HOSPADM

## 2022-12-27 RX ORDER — HYDROMORPHONE HYDROCHLORIDE 1 MG/ML
0.5 INJECTION, SOLUTION INTRAMUSCULAR; INTRAVENOUS; SUBCUTANEOUS EVERY 4 HOURS PRN
Status: DISCONTINUED | OUTPATIENT
Start: 2022-12-27 | End: 2022-12-29

## 2022-12-27 RX ORDER — NALOXONE HYDROCHLORIDE 0.4 MG/ML
80 INJECTION, SOLUTION INTRAMUSCULAR; INTRAVENOUS; SUBCUTANEOUS AS NEEDED
Status: DISCONTINUED | OUTPATIENT
Start: 2022-12-27 | End: 2022-12-27 | Stop reason: HOSPADM

## 2022-12-27 RX ORDER — HYDROCODONE BITARTRATE AND ACETAMINOPHEN 10; 325 MG/1; MG/1
2 TABLET ORAL ONCE AS NEEDED
Status: DISCONTINUED | OUTPATIENT
Start: 2022-12-27 | End: 2022-12-27 | Stop reason: HOSPADM

## 2022-12-27 RX ORDER — MORPHINE SULFATE 2 MG/ML
1 INJECTION, SOLUTION INTRAMUSCULAR; INTRAVENOUS EVERY 2 HOUR PRN
Status: DISCONTINUED | OUTPATIENT
Start: 2022-12-27 | End: 2022-12-27

## 2022-12-27 RX ORDER — SODIUM CHLORIDE, SODIUM LACTATE, POTASSIUM CHLORIDE, CALCIUM CHLORIDE 600; 310; 30; 20 MG/100ML; MG/100ML; MG/100ML; MG/100ML
INJECTION, SOLUTION INTRAVENOUS CONTINUOUS
Status: DISCONTINUED | OUTPATIENT
Start: 2022-12-27 | End: 2022-12-27 | Stop reason: HOSPADM

## 2022-12-27 RX ORDER — CEFAZOLIN SODIUM 1 G/3ML
INJECTION, POWDER, FOR SOLUTION INTRAMUSCULAR; INTRAVENOUS AS NEEDED
Status: DISCONTINUED | OUTPATIENT
Start: 2022-12-27 | End: 2022-12-27 | Stop reason: SURG

## 2022-12-27 RX ORDER — HYDROMORPHONE HYDROCHLORIDE 1 MG/ML
0.4 INJECTION, SOLUTION INTRAMUSCULAR; INTRAVENOUS; SUBCUTANEOUS EVERY 5 MIN PRN
Status: DISCONTINUED | OUTPATIENT
Start: 2022-12-27 | End: 2022-12-27 | Stop reason: HOSPADM

## 2022-12-27 RX ORDER — HYDROMORPHONE HYDROCHLORIDE 1 MG/ML
0.2 INJECTION, SOLUTION INTRAMUSCULAR; INTRAVENOUS; SUBCUTANEOUS EVERY 5 MIN PRN
Status: DISCONTINUED | OUTPATIENT
Start: 2022-12-27 | End: 2022-12-27 | Stop reason: HOSPADM

## 2022-12-27 RX ORDER — METRONIDAZOLE 500 MG/100ML
500 INJECTION, SOLUTION INTRAVENOUS EVERY 8 HOURS
Status: DISCONTINUED | OUTPATIENT
Start: 2022-12-27 | End: 2022-12-29

## 2022-12-27 RX ORDER — HYDROCODONE BITARTRATE AND ACETAMINOPHEN 10; 325 MG/1; MG/1
1 TABLET ORAL ONCE AS NEEDED
Status: DISCONTINUED | OUTPATIENT
Start: 2022-12-27 | End: 2022-12-27 | Stop reason: HOSPADM

## 2022-12-27 RX ORDER — HYDRALAZINE HYDROCHLORIDE 20 MG/ML
10 INJECTION INTRAMUSCULAR; INTRAVENOUS EVERY 4 HOURS PRN
Status: DISCONTINUED | OUTPATIENT
Start: 2022-12-27 | End: 2022-12-29

## 2022-12-27 RX ORDER — MORPHINE SULFATE 2 MG/ML
2 INJECTION, SOLUTION INTRAMUSCULAR; INTRAVENOUS EVERY 2 HOUR PRN
Status: DISCONTINUED | OUTPATIENT
Start: 2022-12-27 | End: 2022-12-27

## 2022-12-27 RX ORDER — HYDROCODONE BITARTRATE AND HOMATROPINE METHYLBROMIDE ORAL SOLUTION 5; 1.5 MG/5ML; MG/5ML
5 LIQUID ORAL EVERY 4 HOURS PRN
Status: DISCONTINUED | OUTPATIENT
Start: 2022-12-27 | End: 2022-12-29

## 2022-12-27 RX ORDER — LIDOCAINE HYDROCHLORIDE 10 MG/ML
INJECTION, SOLUTION EPIDURAL; INFILTRATION; INTRACAUDAL; PERINEURAL AS NEEDED
Status: DISCONTINUED | OUTPATIENT
Start: 2022-12-27 | End: 2022-12-27 | Stop reason: SURG

## 2022-12-27 RX ORDER — MIDAZOLAM HYDROCHLORIDE 1 MG/ML
1 INJECTION INTRAMUSCULAR; INTRAVENOUS EVERY 5 MIN PRN
Status: DISCONTINUED | OUTPATIENT
Start: 2022-12-27 | End: 2022-12-27 | Stop reason: HOSPADM

## 2022-12-27 RX ORDER — HYDROMORPHONE HYDROCHLORIDE 1 MG/ML
0.6 INJECTION, SOLUTION INTRAMUSCULAR; INTRAVENOUS; SUBCUTANEOUS EVERY 5 MIN PRN
Status: DISCONTINUED | OUTPATIENT
Start: 2022-12-27 | End: 2022-12-27 | Stop reason: HOSPADM

## 2022-12-27 RX ORDER — ONDANSETRON 2 MG/ML
4 INJECTION INTRAMUSCULAR; INTRAVENOUS EVERY 6 HOURS PRN
Status: DISCONTINUED | OUTPATIENT
Start: 2022-12-27 | End: 2022-12-27 | Stop reason: HOSPADM

## 2022-12-27 RX ORDER — BENZONATATE 100 MG/1
100 CAPSULE ORAL 3 TIMES DAILY PRN
Status: DISCONTINUED | OUTPATIENT
Start: 2022-12-27 | End: 2022-12-29

## 2022-12-27 RX ORDER — HETASTARCH 6 G/100ML
INJECTION, SOLUTION INTRAVENOUS
Status: DISCONTINUED | OUTPATIENT
Start: 2022-12-27 | End: 2022-12-27 | Stop reason: HOSPADM

## 2022-12-27 RX ORDER — HYDROMORPHONE HYDROCHLORIDE 1 MG/ML
0.5 INJECTION, SOLUTION INTRAMUSCULAR; INTRAVENOUS; SUBCUTANEOUS EVERY 4 HOURS PRN
Status: DISCONTINUED | OUTPATIENT
Start: 2022-12-27 | End: 2022-12-27

## 2022-12-27 RX ORDER — MORPHINE SULFATE 2 MG/ML
0.5 INJECTION, SOLUTION INTRAMUSCULAR; INTRAVENOUS EVERY 2 HOUR PRN
Status: DISCONTINUED | OUTPATIENT
Start: 2022-12-27 | End: 2022-12-27

## 2022-12-27 RX ADMIN — LIDOCAINE HYDROCHLORIDE 25 MG: 10 INJECTION, SOLUTION EPIDURAL; INFILTRATION; INTRACAUDAL; PERINEURAL at 10:15:00

## 2022-12-27 RX ADMIN — ONDANSETRON 4 MG: 2 INJECTION INTRAMUSCULAR; INTRAVENOUS at 10:44:00

## 2022-12-27 RX ADMIN — CEFAZOLIN SODIUM 2 G: 1 INJECTION, POWDER, FOR SOLUTION INTRAMUSCULAR; INTRAVENOUS at 10:41:00

## 2022-12-27 NOTE — OPERATIVE REPORT
Alyssa Davenport. 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 PROCEDURE DESCRIPTION: After careful digital rectal examination a scope was introduced into the patients rectum, advanced pass the recto sigmoid junction, into the sigmoid colon. Careful examination of the above described areas was performed on withdrawal of the endoscope. The patient tolerated the procedure well, there were no immediate complication immediately following the procedure, and the patient was transferred to recovery in stable condition. QUALITY OF PREPARATION: [place] Bowel Preparation Scale:             Left colon 2  FINDINGS/THERAPEUTICS:  - SIGMOID COLON: Migrated Axios stent, mild mucosal erosion at the site, the stent was grasped with a rat tooth forceps and removed an block with the scope, after this careful mucosal inspection was performed, no bleeding or perforation noted.   RECOMMENDATIONS:   - Post sigmoidoscopy precautions, watch for bleeding, infection, perforation, adverse drug reactions     Calixto Chambers MD  2022  10:52 AM

## 2022-12-27 NOTE — ANESTHESIA PROCEDURE NOTES
Airway  Date/Time: 12/27/2022 10:17 AM  Urgency: elective    Airway not difficult    General Information and Staff    Patient location during procedure: OR  Anesthesiologist: Francine Noriega MD  Performed: anesthesiologist     Indications and Patient Condition  Indications for airway management: anesthesia  Sedation level: deep  Preoxygenated: yes  Patient position: sniffing  Mask difficulty assessment: 1 - vent by mask  Planned trial extubation    Final Airway Details  Final airway type: endotracheal airway      Successful airway: ETT  Cuffed: yes   Successful intubation technique: direct laryngoscopy  Endotracheal tube insertion site: oral  Blade: Sowmya  Blade size: #3  ETT size (mm): 7.5    Cormack-Lehane Classification: grade IIA - partial view of glottis  Placement verified by: chest auscultation and capnometry   Measured from: lips  ETT to lips (cm): 20  Number of attempts at approach: 1

## 2022-12-27 NOTE — PLAN OF CARE
Problem: Patient/Family Goals  Goal: Patient/Family Long Term Goal  Description: Patient's Long Term Goal: treat cancer    Interventions:  - follow up with oncology  -report new or worsening symptoms to healthcare team    - See additional Care Plan goals for specific interventions  Outcome: Progressing  Goal: Patient/Family Short Term Goal  Description: Patient's Short Term Goal: relief of nausea, pain management    Interventions:   - PRN zofran, compazine  -PRN morphine  -IV fluids  -GI consult  - See additional Care Plan goals for specific interventions  Outcome: Progressing     Problem: GASTROINTESTINAL - ADULT  Goal: Minimal or absence of nausea and vomiting  Description: INTERVENTIONS:  - Maintain adequate hydration with IV or PO as ordered and tolerated  - Nasogastric tube to low intermittent suction as ordered  - Evaluate effectiveness of ordered antiemetic medications  - Provide nonpharmacologic comfort measures as appropriate  - Advance diet as tolerated, if ordered  - Obtain nutritional consult as needed  - Evaluate fluid balance  Outcome: Progressing  Goal: Maintains or returns to baseline bowel function  Description: INTERVENTIONS:  - Assess bowel function  - Maintain adequate hydration with IV or PO as ordered and tolerated  - Evaluate effectiveness of GI medications  - Encourage mobilization and activity  - Obtain nutritional consult as needed  - Establish a toileting routine/schedule  - Consider collaborating with pharmacy to review patient's medication profile  Outcome: Progressing  Goal: Maintains adequate nutritional intake (undernourished)  Description: INTERVENTIONS:  - Monitor percentage of each meal consumed  - Identify factors contributing to decreased intake, treat as appropriate  - Assist with meals as needed  - Monitor I&O, WT and lab values  - Obtain nutritional consult as needed  - Optimize oral hygiene and moisture  - Encourage food from home; allow for food preferences  - Enhance eating environment  Outcome: Progressing     Problem: CARDIOVASCULAR - ADULT  Goal: Maintains optimal cardiac output and hemodynamic stability  Description: INTERVENTIONS:  - Monitor vital signs, rhythm, and trends  - Monitor for bleeding, hypotension and signs of decreased cardiac output  - Evaluate effectiveness of vasoactive medications to optimize hemodynamic stability  - Monitor arterial and/or venous puncture sites for bleeding and/or hematoma  - Assess quality of pulses, skin color and temperature  - Assess for signs of decreased coronary artery perfusion - ex.  Angina  - Evaluate fluid balance, assess for edema, trend weights  Outcome: Progressing  Goal: Absence of cardiac arrhythmias or at baseline  Description: INTERVENTIONS:  - Continuous cardiac monitoring, monitor vital signs, obtain 12 lead EKG if indicated  - Evaluate effectiveness of antiarrhythmic and heart rate control medications as ordered  - Initiate emergency measures for life threatening arrhythmias  - Monitor electrolytes and administer replacement therapy as ordered  Outcome: Progressing

## 2022-12-27 NOTE — PROGRESS NOTES
Pharmacy Note:  Dose Adjustment for levofloxacin (Felisa Cowden)         [place] Mcardle. is a 47year old male who has been prescribed levofloxacin 500mg q24hr. Est CrCl: >100 mL/min    The dose has been adjusted to levofloxacin 750mg q24hr per hospital renal dose adjustment protocol. Pharmacy will follow and adjust dose as warranted for renal function changes.     Thank you,      Yulia Mantilla, PharmD, BCPS  12/27/2022  11:59 AM

## 2022-12-27 NOTE — PLAN OF CARE
Received patient at 0730. Alert and oriented x 4. Tele rhythm shows NSR. O2 saturation adequate on RA. Pt continent, has urinal at bedside. Up ad tito. PCA stopped and TPN turned off this morning. Bed is locked and in low position, call light and personal items within reach. Will continue to monitor. 0840 - Left for sigmoidoscopy procedure. 1150 - Received pt back from PACU.     Problem: Patient/Family Goals  Goal: Patient/Family Long Term Goal  Description: Patient's Long Term Goal: treat cancer    Interventions:  - follow up with oncology  -report new or worsening symptoms to healthcare team    - See additional Care Plan goals for specific interventions  Outcome: Progressing  Goal: Patient/Family Short Term Goal  Description: Patient's Short Term Goal: relief of nausea, pain management    Interventions:   - PRN zofran, compazine  -PRN morphine  -IV fluids  -GI consult  - See additional Care Plan goals for specific interventions  Outcome: Progressing     Problem: GASTROINTESTINAL - ADULT  Goal: Minimal or absence of nausea and vomiting  Description: INTERVENTIONS:  - Maintain adequate hydration with IV or PO as ordered and tolerated  - Nasogastric tube to low intermittent suction as ordered  - Evaluate effectiveness of ordered antiemetic medications  - Provide nonpharmacologic comfort measures as appropriate  - Advance diet as tolerated, if ordered  - Obtain nutritional consult as needed  - Evaluate fluid balance  Outcome: Progressing  Goal: Maintains or returns to baseline bowel function  Description: INTERVENTIONS:  - Assess bowel function  - Maintain adequate hydration with IV or PO as ordered and tolerated  - Evaluate effectiveness of GI medications  - Encourage mobilization and activity  - Obtain nutritional consult as needed  - Establish a toileting routine/schedule  - Consider collaborating with pharmacy to review patient's medication profile  Outcome: Progressing  Goal: Maintains adequate nutritional intake (undernourished)  Description: INTERVENTIONS:  - Monitor percentage of each meal consumed  - Identify factors contributing to decreased intake, treat as appropriate  - Assist with meals as needed  - Monitor I&O, WT and lab values  - Obtain nutritional consult as needed  - Optimize oral hygiene and moisture  - Encourage food from home; allow for food preferences  - Enhance eating environment  Outcome: Progressing     Problem: CARDIOVASCULAR - ADULT  Goal: Maintains optimal cardiac output and hemodynamic stability  Description: INTERVENTIONS:  - Monitor vital signs, rhythm, and trends  - Monitor for bleeding, hypotension and signs of decreased cardiac output  - Evaluate effectiveness of vasoactive medications to optimize hemodynamic stability  - Monitor arterial and/or venous puncture sites for bleeding and/or hematoma  - Assess quality of pulses, skin color and temperature  - Assess for signs of decreased coronary artery perfusion - ex.  Angina  - Evaluate fluid balance, assess for edema, trend weights  Outcome: Progressing  Goal: Absence of cardiac arrhythmias or at baseline  Description: INTERVENTIONS:  - Continuous cardiac monitoring, monitor vital signs, obtain 12 lead EKG if indicated  - Evaluate effectiveness of antiarrhythmic and heart rate control medications as ordered  - Initiate emergency measures for life threatening arrhythmias  - Monitor electrolytes and administer replacement therapy as ordered  Outcome: Progressing

## 2022-12-28 ENCOUNTER — APPOINTMENT (OUTPATIENT)
Dept: GENERAL RADIOLOGY | Facility: HOSPITAL | Age: 52
DRG: 327 | End: 2022-12-28
Attending: INTERNAL MEDICINE
Payer: COMMERCIAL

## 2022-12-28 LAB
ANION GAP SERPL CALC-SCNC: 6 MMOL/L (ref 0–18)
BUN BLD-MCNC: 16 MG/DL (ref 7–18)
CALCIUM BLD-MCNC: 8.3 MG/DL (ref 8.5–10.1)
CHLORIDE SERPL-SCNC: 102 MMOL/L (ref 98–112)
CO2 SERPL-SCNC: 27 MMOL/L (ref 21–32)
CREAT BLD-MCNC: 0.42 MG/DL
GFR SERPLBLD BASED ON 1.73 SQ M-ARVRAT: 129 ML/MIN/1.73M2 (ref 60–?)
GLUCOSE BLD-MCNC: 105 MG/DL (ref 70–99)
GLUCOSE BLD-MCNC: 132 MG/DL (ref 70–99)
GLUCOSE BLD-MCNC: 142 MG/DL (ref 70–99)
GLUCOSE BLD-MCNC: 88 MG/DL (ref 70–99)
MAGNESIUM SERPL-MCNC: 1.9 MG/DL (ref 1.6–2.6)
OSMOLALITY SERPL CALC.SUM OF ELEC: 284 MOSM/KG (ref 275–295)
PHOSPHATE SERPL-MCNC: 2.9 MG/DL (ref 2.5–4.9)
POTASSIUM SERPL-SCNC: 4.2 MMOL/L (ref 3.5–5.1)
SODIUM SERPL-SCNC: 135 MMOL/L (ref 136–145)

## 2022-12-28 PROCEDURE — 99232 SBSQ HOSP IP/OBS MODERATE 35: CPT | Performed by: HOSPITALIST

## 2022-12-28 PROCEDURE — 74220 X-RAY XM ESOPHAGUS 1CNTRST: CPT | Performed by: INTERNAL MEDICINE

## 2022-12-28 NOTE — PLAN OF CARE
Received patient, alert and oriented. Been belching a lot. Still NPO. Denied nausea/vomiting. Reported severe pain. Up ad tito. Discussed POC. Due meds given. TPN started at 2100. Safety measures reinforced, call light within reach. Needs attended to. Will continue to monitor.       Problem: Patient/Family Goals  Goal: Patient/Family Long Term Goal  Description: Patient's Long Term Goal: treat cancer    Interventions:  - follow up with oncology  -report new or worsening symptoms to healthcare team    - See additional Care Plan goals for specific interventions  Outcome: Progressing  Goal: Patient/Family Short Term Goal  Description: Patient's Short Term Goal: relief of nausea, pain management    Interventions:   - PRN zofran, compazine  -PRN morphine  -IV fluids  -GI consult  - See additional Care Plan goals for specific interventions  Outcome: Progressing     Problem: GASTROINTESTINAL - ADULT  Goal: Minimal or absence of nausea and vomiting  Description: INTERVENTIONS:  - Maintain adequate hydration with IV or PO as ordered and tolerated  - Nasogastric tube to low intermittent suction as ordered  - Evaluate effectiveness of ordered antiemetic medications  - Provide nonpharmacologic comfort measures as appropriate  - Advance diet as tolerated, if ordered  - Obtain nutritional consult as needed  - Evaluate fluid balance  Outcome: Progressing  Goal: Maintains or returns to baseline bowel function  Description: INTERVENTIONS:  - Assess bowel function  - Maintain adequate hydration with IV or PO as ordered and tolerated  - Evaluate effectiveness of GI medications  - Encourage mobilization and activity  - Obtain nutritional consult as needed  - Establish a toileting routine/schedule  - Consider collaborating with pharmacy to review patient's medication profile  Outcome: Progressing  Goal: Maintains adequate nutritional intake (undernourished)  Description: INTERVENTIONS:  - Monitor percentage of each meal consumed  - Identify factors contributing to decreased intake, treat as appropriate  - Assist with meals as needed  - Monitor I&O, WT and lab values  - Obtain nutritional consult as needed  - Optimize oral hygiene and moisture  - Encourage food from home; allow for food preferences  - Enhance eating environment  Outcome: Progressing     Problem: CARDIOVASCULAR - ADULT  Goal: Maintains optimal cardiac output and hemodynamic stability  Description: INTERVENTIONS:  - Monitor vital signs, rhythm, and trends  - Monitor for bleeding, hypotension and signs of decreased cardiac output  - Evaluate effectiveness of vasoactive medications to optimize hemodynamic stability  - Monitor arterial and/or venous puncture sites for bleeding and/or hematoma  - Assess quality of pulses, skin color and temperature  - Assess for signs of decreased coronary artery perfusion - ex.  Angina  - Evaluate fluid balance, assess for edema, trend weights  Outcome: Progressing  Goal: Absence of cardiac arrhythmias or at baseline  Description: INTERVENTIONS:  - Continuous cardiac monitoring, monitor vital signs, obtain 12 lead EKG if indicated  - Evaluate effectiveness of antiarrhythmic and heart rate control medications as ordered  - Initiate emergency measures for life threatening arrhythmias  - Monitor electrolytes and administer replacement therapy as ordered  Outcome: Progressing

## 2022-12-28 NOTE — PLAN OF CARE
Assumed care of patient @ 0730. Patient is A&O x4, up ad tito. Complaint of esophageal pain 8/10 relieved by IV Dilaudid. reglan for nausea, benadryl for cough. Patient is in normal sinus rhythm. On room air, no chest pain, no dyspnea on exertion. Patient is continent of bladder and bowel, last BM 12/27. Fall precautions in place, belongings within reach. Patient went to [facility] for upper GI panel. Awaiting update from GI. Patient NPO until cleared by GI. Patient updated on plan of care.

## 2022-12-28 NOTE — PROGRESS NOTES
Case discussed with Dr. Artem Cleveland, given prior pain as well as belching, will look to hold off on clear liquid trial today and keep n.p.o. Depending on clinical progression, will consider clear liquid trial tomorrow in a.m.  RN called and made aware of this plan. RN will pass this along to the patient.

## 2022-12-29 VITALS
DIASTOLIC BLOOD PRESSURE: 82 MMHG | WEIGHT: 233 LBS | OXYGEN SATURATION: 97 % | BODY MASS INDEX: 30 KG/M2 | RESPIRATION RATE: 18 BRPM | TEMPERATURE: 99 F | SYSTOLIC BLOOD PRESSURE: 127 MMHG | HEART RATE: 77 BPM

## 2022-12-29 LAB
ANION GAP SERPL CALC-SCNC: 5 MMOL/L (ref 0–18)
BUN BLD-MCNC: 14 MG/DL (ref 7–18)
CALCIUM BLD-MCNC: 8.3 MG/DL (ref 8.5–10.1)
CHLORIDE SERPL-SCNC: 104 MMOL/L (ref 98–112)
CO2 SERPL-SCNC: 28 MMOL/L (ref 21–32)
CREAT BLD-MCNC: 0.37 MG/DL
GFR SERPLBLD BASED ON 1.73 SQ M-ARVRAT: 134 ML/MIN/1.73M2 (ref 60–?)
GLUCOSE BLD-MCNC: 111 MG/DL (ref 70–99)
GLUCOSE BLD-MCNC: 163 MG/DL (ref 70–99)
MAGNESIUM SERPL-MCNC: 2 MG/DL (ref 1.6–2.6)
OSMOLALITY SERPL CALC.SUM OF ELEC: 285 MOSM/KG (ref 275–295)
PHOSPHATE SERPL-MCNC: 4.2 MG/DL (ref 2.5–4.9)
POTASSIUM SERPL-SCNC: 4.4 MMOL/L (ref 3.5–5.1)
SODIUM SERPL-SCNC: 137 MMOL/L (ref 136–145)

## 2022-12-29 PROCEDURE — 99232 SBSQ HOSP IP/OBS MODERATE 35: CPT | Performed by: HOSPITALIST

## 2022-12-29 RX ORDER — OXYCODONE HCL 5 MG/5 ML
5 SOLUTION, ORAL ORAL EVERY 8 HOURS PRN
Qty: 150 ML | Refills: 0 | Status: SHIPPED | OUTPATIENT
Start: 2022-12-29 | End: 2023-01-05

## 2022-12-29 RX ORDER — METRONIDAZOLE 500 MG/1
500 TABLET ORAL 3 TIMES DAILY
Qty: 21 TABLET | Refills: 0 | Status: SHIPPED | OUTPATIENT
Start: 2022-12-29 | End: 2023-01-05 | Stop reason: ALTCHOICE

## 2022-12-29 RX ORDER — LEVOFLOXACIN 750 MG/1
750 TABLET ORAL DAILY
Qty: 7 TABLET | Refills: 0 | Status: SHIPPED | OUTPATIENT
Start: 2022-12-29 | End: 2023-01-05 | Stop reason: ALTCHOICE

## 2022-12-29 NOTE — CM/SW NOTE
SW received call from [place] at [facility] stating that the TPN supplies and RX was available 12/28. Sounds like they can deliver the supplies when ready for discharge. Updates sent to [facility] in [address] as well.      NORBERTO Poole, [facility]  Discharge [address] [facility].

## 2022-12-29 NOTE — PLAN OF CARE
Received patient at 0730. Alert and oriented x4. Tele rhythm NSR. O2 saturation 97% on room air. Breath sounds clear. Bed is locked and in low position. Call light and personal belongings in reach. No c/o chest pain or shortness of breath. Pt voiding with no issue. Pt ambulated in room with no issues. Advance diet to clear liquid. Possible discharge today per GI. Skin dry and intact. Care plan reviewed, pt verbalizes understanding.        Problem: Patient/Family Goals  Goal: Patient/Family Long Term Goal  Description: Patient's Long Term Goal: treat cancer    Interventions:  - follow up with oncology  -report new or worsening symptoms to healthcare team    - See additional Care Plan goals for specific interventions  Outcome: Progressing  Goal: Patient/Family Short Term Goal  Description: Patient's Short Term Goal: relief of nausea, pain management    Interventions:   - PRN zofran, compazine  -PRN morphine  -IV fluids  -GI consult  - See additional Care Plan goals for specific interventions  Outcome: Progressing     Problem: GASTROINTESTINAL - ADULT  Goal: Minimal or absence of nausea and vomiting  Description: INTERVENTIONS:  - Maintain adequate hydration with IV or PO as ordered and tolerated  - Nasogastric tube to low intermittent suction as ordered  - Evaluate effectiveness of ordered antiemetic medications  - Provide nonpharmacologic comfort measures as appropriate  - Advance diet as tolerated, if ordered  - Obtain nutritional consult as needed  - Evaluate fluid balance  Outcome: Progressing  Goal: Maintains or returns to baseline bowel function  Description: INTERVENTIONS:  - Assess bowel function  - Maintain adequate hydration with IV or PO as ordered and tolerated  - Evaluate effectiveness of GI medications  - Encourage mobilization and activity  - Obtain nutritional consult as needed  - Establish a toileting routine/schedule  - Consider collaborating with pharmacy to review patient's medication profile  Outcome: Progressing  Goal: Maintains adequate nutritional intake (undernourished)  Description: INTERVENTIONS:  - Monitor percentage of each meal consumed  - Identify factors contributing to decreased intake, treat as appropriate  - Assist with meals as needed  - Monitor I&O, WT and lab values  - Obtain nutritional consult as needed  - Optimize oral hygiene and moisture  - Encourage food from home; allow for food preferences  - Enhance eating environment  Outcome: Progressing     Problem: CARDIOVASCULAR - ADULT  Goal: Maintains optimal cardiac output and hemodynamic stability  Description: INTERVENTIONS:  - Monitor vital signs, rhythm, and trends  - Monitor for bleeding, hypotension and signs of decreased cardiac output  - Evaluate effectiveness of vasoactive medications to optimize hemodynamic stability  - Monitor arterial and/or venous puncture sites for bleeding and/or hematoma  - Assess quality of pulses, skin color and temperature  - Assess for signs of decreased coronary artery perfusion - ex.  Angina  - Evaluate fluid balance, assess for edema, trend weights  Outcome: Progressing  Goal: Absence of cardiac arrhythmias or at baseline  Description: INTERVENTIONS:  - Continuous cardiac monitoring, monitor vital signs, obtain 12 lead EKG if indicated  - Evaluate effectiveness of antiarrhythmic and heart rate control medications as ordered  - Initiate emergency measures for life threatening arrhythmias  - Monitor electrolytes and administer replacement therapy as ordered  Outcome: Progressing

## 2022-12-29 NOTE — PLAN OF CARE
Received patient at 1 . Alert and oriented x4. Tele rhythm is showing normal sinus . O2 saturation is within normal range on room air. Breath sounds are regular and clear bilaterally. Patient is ambulating with stand by assist . Patient is voiding. Last BM was 12/28. Patient had complaints of throat pain, pain medication was given. Patient had complaints of nausea and vomiting, antiemetics were given. TPN is currently running and plan to reevaluate change to clear liquid diet in the morning. Call light is within patient reach. All patient needs are currently met.       Problem: Patient/Family Goals  Goal: Patient/Family Long Term Goal  Description: Patient's Long Term Goal: treat cancer    Interventions:  - follow up with oncology  -report new or worsening symptoms to healthcare team    - See additional Care Plan goals for specific interventions  Outcome: Progressing  Goal: Patient/Family Short Term Goal  Description: Patient's Short Term Goal: relief of nausea, pain management    Interventions:   - PRN zofran, compazine  -PRN morphine  -IV fluids  -GI consult  - See additional Care Plan goals for specific interventions  Outcome: Progressing     Problem: GASTROINTESTINAL - ADULT  Goal: Minimal or absence of nausea and vomiting  Description: INTERVENTIONS:  - Maintain adequate hydration with IV or PO as ordered and tolerated  - Nasogastric tube to low intermittent suction as ordered  - Evaluate effectiveness of ordered antiemetic medications  - Provide nonpharmacologic comfort measures as appropriate  - Advance diet as tolerated, if ordered  - Obtain nutritional consult as needed  - Evaluate fluid balance  Outcome: Progressing  Goal: Maintains or returns to baseline bowel function  Description: INTERVENTIONS:  - Assess bowel function  - Maintain adequate hydration with IV or PO as ordered and tolerated  - Evaluate effectiveness of GI medications  - Encourage mobilization and activity  - Obtain nutritional consult as needed  - Establish a toileting routine/schedule  - Consider collaborating with pharmacy to review patient's medication profile  Outcome: Progressing  Goal: Maintains adequate nutritional intake (undernourished)  Description: INTERVENTIONS:  - Monitor percentage of each meal consumed  - Identify factors contributing to decreased intake, treat as appropriate  - Assist with meals as needed  - Monitor I&O, WT and lab values  - Obtain nutritional consult as needed  - Optimize oral hygiene and moisture  - Encourage food from home; allow for food preferences  - Enhance eating environment  Outcome: Progressing     Problem: CARDIOVASCULAR - ADULT  Goal: Maintains optimal cardiac output and hemodynamic stability  Description: INTERVENTIONS:  - Monitor vital signs, rhythm, and trends  - Monitor for bleeding, hypotension and signs of decreased cardiac output  - Evaluate effectiveness of vasoactive medications to optimize hemodynamic stability  - Monitor arterial and/or venous puncture sites for bleeding and/or hematoma  - Assess quality of pulses, skin color and temperature  - Assess for signs of decreased coronary artery perfusion - ex.  Angina  - Evaluate fluid balance, assess for edema, trend weights  Outcome: Progressing  Goal: Absence of cardiac arrhythmias or at baseline  Description: INTERVENTIONS:  - Continuous cardiac monitoring, monitor vital signs, obtain 12 lead EKG if indicated  - Evaluate effectiveness of antiarrhythmic and heart rate control medications as ordered  - Initiate emergency measures for life threatening arrhythmias  - Monitor electrolytes and administer replacement therapy as ordered  Outcome: Progressing

## 2022-12-29 NOTE — DISCHARGE INSTRUCTIONS
Resume home health services with [facility] at hospital discharge. [address]  P: [phone]  F: [phone]    [facility] [phone]  [facility] provides your TPN supplies and coordinate care with [facility].

## 2022-12-30 ENCOUNTER — APPOINTMENT (OUTPATIENT)
Dept: HEMATOLOGY/ONCOLOGY | Facility: HOSPITAL | Age: 52
End: 2022-12-30
Attending: INTERNAL MEDICINE
Payer: COMMERCIAL

## 2022-12-30 ENCOUNTER — PATIENT OUTREACH (OUTPATIENT)
Dept: CASE MANAGEMENT | Age: 52
End: 2022-12-30

## 2022-12-30 NOTE — CM/SW NOTE
12/30/22 0800   Discharge disposition   Expected discharge disposition Home or [facility] - NADER   DME/Infusion Providers [facility] Dr Daniela DAWKINS notified [facility] that pt discharged yesterday. AVS uploaded. MARIZA was notified also that [facility] delivered supplies last night.      NORBERTO Muller, [facility]  Discharge [address].

## 2022-12-30 NOTE — PLAN OF CARE
Pt is OK to discharge per primary physician and consults. Discharge instructions provided to patient, including medications and follow ups. Pt verbalizes understanding. IV removed, telemetry monitor discontinued. All belongings sent with patient. Pt transported out from unit via wheelchair.

## 2022-12-30 NOTE — PAYOR COMM NOTE
--------------  DISCHARGE REVIEW    Payor: Abdoul Stafford [employer] [facility] PPO  Subscriber #:  [id number]  Authorization Number: [id number]    Admit date: 12/24/22  Admit time:   6:11 PM  Discharge Date: 12/29/2022  5:57 PM     Admitting Physician: Salma Galvan MD  Attending Physician:  No att. providers found  Primary Care Physician: Nico Fine MD

## 2023-01-04 ENCOUNTER — HOSPITAL ENCOUNTER (OUTPATIENT)
Dept: NUCLEAR MEDICINE | Facility: HOSPITAL | Age: 53
Discharge: HOME | End: 2023-01-04
Attending: INTERNAL MEDICINE
Payer: COMMERCIAL

## 2023-01-04 DIAGNOSIS — M62.81 MUSCLE WEAKNESS: Primary | ICD-10-CM

## 2023-01-04 DIAGNOSIS — C15.9 MALIGNANT NEOPLASM OF ESOPHAGUS, UNSPECIFIED LOCATION (HCC): ICD-10-CM

## 2023-01-04 PROCEDURE — 78815 PET IMAGE W/CT SKULL-THIGH: CPT | Performed by: INTERNAL MEDICINE

## 2023-01-04 PROCEDURE — 82962 GLUCOSE BLOOD TEST: CPT

## 2023-01-05 ENCOUNTER — OFFICE VISIT (OUTPATIENT)
Dept: FAMILY MEDICINE CLINIC | Facility: CLINIC | Age: 53
End: 2023-01-05
Payer: COMMERCIAL

## 2023-01-05 ENCOUNTER — PATIENT MESSAGE (OUTPATIENT)
Dept: FAMILY MEDICINE CLINIC | Facility: CLINIC | Age: 53
End: 2023-01-05

## 2023-01-05 ENCOUNTER — HOSPITAL ENCOUNTER (OUTPATIENT)
Dept: GENERAL RADIOLOGY | Age: 53
Discharge: HOME | End: 2023-01-05
Attending: FAMILY MEDICINE
Payer: COMMERCIAL

## 2023-01-05 VITALS
HEIGHT: 74 IN | DIASTOLIC BLOOD PRESSURE: 80 MMHG | HEART RATE: 89 BPM | RESPIRATION RATE: 16 BRPM | OXYGEN SATURATION: 97 % | SYSTOLIC BLOOD PRESSURE: 120 MMHG | BODY MASS INDEX: 29.52 KG/M2 | WEIGHT: 230 LBS

## 2023-01-05 DIAGNOSIS — Z78.9 ON TOTAL PARENTERAL NUTRITION (TPN): ICD-10-CM

## 2023-01-05 DIAGNOSIS — C15.5 MALIGNANT NEOPLASM OF LOWER THIRD OF ESOPHAGUS (HCC): ICD-10-CM

## 2023-01-05 DIAGNOSIS — E78.5 HYPERLIPIDEMIA WITH TARGET LOW DENSITY LIPOPROTEIN (LDL) CHOLESTEROL LESS THAN 70 MG/DL: ICD-10-CM

## 2023-01-05 DIAGNOSIS — Z95.1 S/P CABG X 4: ICD-10-CM

## 2023-01-05 DIAGNOSIS — J90 RECURRENT RIGHT PLEURAL EFFUSION: ICD-10-CM

## 2023-01-05 DIAGNOSIS — I10 ESSENTIAL HYPERTENSION: ICD-10-CM

## 2023-01-05 DIAGNOSIS — J90 RECURRENT RIGHT PLEURAL EFFUSION: Primary | ICD-10-CM

## 2023-01-05 PROBLEM — R04.2 COUGH WITH HEMOPTYSIS: Status: RESOLVED | Noted: 2022-12-12 | Resolved: 2023-01-05

## 2023-01-05 PROCEDURE — 71046 X-RAY EXAM CHEST 2 VIEWS: CPT | Performed by: FAMILY MEDICINE

## 2023-01-05 PROCEDURE — 3079F DIAST BP 80-89 MM HG: CPT | Performed by: FAMILY MEDICINE

## 2023-01-05 PROCEDURE — 3074F SYST BP LT 130 MM HG: CPT | Performed by: FAMILY MEDICINE

## 2023-01-05 PROCEDURE — 99214 OFFICE O/P EST MOD 30 MIN: CPT | Performed by: FAMILY MEDICINE

## 2023-01-05 PROCEDURE — 3008F BODY MASS INDEX DOCD: CPT | Performed by: FAMILY MEDICINE

## 2023-01-05 RX ORDER — OXYCODONE HCL 5 MG/5 ML
5 SOLUTION, ORAL ORAL EVERY 8 HOURS PRN
Qty: 200 ML | Refills: 0 | Status: SHIPPED | OUTPATIENT
Start: 2023-01-05

## 2023-01-05 NOTE — TELEPHONE ENCOUNTER
MyChart sent to provide Dr insight:no new changes from your previous chest Xray. You do not need any oral antibiotics at this time as there are no signs of pneumonia.

## 2023-01-05 NOTE — TELEPHONE ENCOUNTER
Pt asking for insight post CXR done today (1/5)) that reports: \"Small right-sided pleural effusion right basilar atelectasis/infiltrates\". Please advise.

## 2023-01-05 NOTE — TELEPHONE ENCOUNTER
From: Rebecca Snyder.   To: Anselmo Zarate MD  Sent: 1/5/2023 1:38 PM CST  Subject: Luis Antonio De Leon     I see the chest x-ray came back anything you want me to do let me know thank you

## 2023-01-06 ENCOUNTER — MED REC SCAN ONLY (OUTPATIENT)
Dept: FAMILY MEDICINE CLINIC | Facility: CLINIC | Age: 53
End: 2023-01-06

## 2023-01-06 LAB — GLUCOSE BLD-MCNC: 120 MG/DL (ref 70–99)

## 2023-01-09 ENCOUNTER — OFFICE VISIT (OUTPATIENT)
Dept: HEMATOLOGY/ONCOLOGY | Age: 53
End: 2023-01-09
Attending: INTERNAL MEDICINE
Payer: COMMERCIAL

## 2023-01-09 ENCOUNTER — PATIENT MESSAGE (OUTPATIENT)
Dept: FAMILY MEDICINE CLINIC | Facility: CLINIC | Age: 53
End: 2023-01-09

## 2023-01-09 VITALS
OXYGEN SATURATION: 100 % | TEMPERATURE: 97 F | SYSTOLIC BLOOD PRESSURE: 146 MMHG | DIASTOLIC BLOOD PRESSURE: 89 MMHG | WEIGHT: 233 LBS | HEIGHT: 74.02 IN | BODY MASS INDEX: 29.9 KG/M2 | HEART RATE: 75 BPM

## 2023-01-09 DIAGNOSIS — C15.5 MALIGNANT NEOPLASM OF LOWER THIRD OF ESOPHAGUS (HCC): Primary | ICD-10-CM

## 2023-01-09 DIAGNOSIS — J86.9 EMPYEMA (HCC): ICD-10-CM

## 2023-01-09 DIAGNOSIS — I10 ESSENTIAL HYPERTENSION: ICD-10-CM

## 2023-01-09 DIAGNOSIS — C15.5 MALIGNANT NEOPLASM OF LOWER THIRD OF ESOPHAGUS (HCC): ICD-10-CM

## 2023-01-09 DIAGNOSIS — I80.3 THROMBOPHLEBITIS LEG: Primary | ICD-10-CM

## 2023-01-09 DIAGNOSIS — K91.89 ESOPHAGEAL ANASTOMOTIC LEAK: ICD-10-CM

## 2023-01-09 DIAGNOSIS — J93.12 SECONDARY SPONTANEOUS PNEUMOTHORAX: ICD-10-CM

## 2023-01-09 DIAGNOSIS — Z95.1 S/P CABG X 4: ICD-10-CM

## 2023-01-09 DIAGNOSIS — E66.01 MORBID OBESITY WITH BMI OF 40.0-44.9, ADULT (HCC): ICD-10-CM

## 2023-01-09 LAB
ALBUMIN SERPL-MCNC: 3 G/DL (ref 3.4–5)
ALBUMIN/GLOB SERPL: 0.7 {RATIO} (ref 1–2)
ALP LIVER SERPL-CCNC: 114 U/L
ALT SERPL-CCNC: 43 U/L
ANION GAP SERPL CALC-SCNC: 6 MMOL/L (ref 0–18)
AST SERPL-CCNC: 26 U/L (ref 15–37)
BASOPHILS # BLD AUTO: 0.02 X10(3) UL (ref 0–0.2)
BASOPHILS NFR BLD AUTO: 0.3 %
BILIRUB SERPL-MCNC: 0.4 MG/DL (ref 0.1–2)
BUN BLD-MCNC: 19 MG/DL (ref 7–18)
CALCIUM BLD-MCNC: 9.6 MG/DL (ref 8.5–10.1)
CANCER AG19-9 SERPL-ACNC: 198.4 U/ML (ref ?–37)
CHLORIDE SERPL-SCNC: 100 MMOL/L (ref 98–112)
CO2 SERPL-SCNC: 30 MMOL/L (ref 21–32)
CREAT BLD-MCNC: 0.61 MG/DL
EOSINOPHIL # BLD AUTO: 0.12 X10(3) UL (ref 0–0.7)
EOSINOPHIL NFR BLD AUTO: 2.1 %
ERYTHROCYTE [DISTWIDTH] IN BLOOD BY AUTOMATED COUNT: 19.6 %
GFR SERPLBLD BASED ON 1.73 SQ M-ARVRAT: 115 ML/MIN/1.73M2 (ref 60–?)
GLOBULIN PLAS-MCNC: 4.6 G/DL (ref 2.8–4.4)
GLUCOSE BLD-MCNC: 134 MG/DL (ref 70–99)
HCT VFR BLD AUTO: 37.2 %
HGB BLD-MCNC: 11.5 G/DL
IMM GRANULOCYTES # BLD AUTO: 0.12 X10(3) UL (ref 0–1)
IMM GRANULOCYTES NFR BLD: 2.1 %
LYMPHOCYTES # BLD AUTO: 0.54 X10(3) UL (ref 1–4)
LYMPHOCYTES NFR BLD AUTO: 9.4 %
MCH RBC QN AUTO: 27.8 PG (ref 26–34)
MCHC RBC AUTO-ENTMCNC: 30.9 G/DL (ref 31–37)
MCV RBC AUTO: 90.1 FL
MONOCYTES # BLD AUTO: 0.41 X10(3) UL (ref 0.1–1)
MONOCYTES NFR BLD AUTO: 7.1 %
NEUTROPHILS # BLD AUTO: 4.53 X10 (3) UL (ref 1.5–7.7)
NEUTROPHILS # BLD AUTO: 4.53 X10(3) UL (ref 1.5–7.7)
NEUTROPHILS NFR BLD AUTO: 79 %
OSMOLALITY SERPL CALC.SUM OF ELEC: 286 MOSM/KG (ref 275–295)
PLATELET # BLD AUTO: 307 10(3)UL (ref 150–450)
POTASSIUM SERPL-SCNC: 4.5 MMOL/L (ref 3.5–5.1)
PROT SERPL-MCNC: 7.6 G/DL (ref 6.4–8.2)
RBC # BLD AUTO: 4.13 X10(6)UL
SODIUM SERPL-SCNC: 136 MMOL/L (ref 136–145)
T4 FREE SERPL-MCNC: 0.9 NG/DL (ref 0.8–1.7)
TSI SER-ACNC: 0.15 MIU/ML (ref 0.36–3.74)
WBC # BLD AUTO: 5.7 X10(3) UL (ref 4–11)

## 2023-01-09 PROCEDURE — 96413 CHEMO IV INFUSION 1 HR: CPT

## 2023-01-09 PROCEDURE — 36593 DECLOT VASCULAR DEVICE: CPT

## 2023-01-09 PROCEDURE — 36415 COLL VENOUS BLD VENIPUNCTURE: CPT

## 2023-01-09 PROCEDURE — 86301 IMMUNOASSAY TUMOR CA 19-9: CPT | Performed by: INTERNAL MEDICINE

## 2023-01-09 PROCEDURE — 84439 ASSAY OF FREE THYROXINE: CPT | Performed by: INTERNAL MEDICINE

## 2023-01-09 PROCEDURE — 80053 COMPREHEN METABOLIC PANEL: CPT | Performed by: INTERNAL MEDICINE

## 2023-01-09 PROCEDURE — 85025 COMPLETE CBC W/AUTO DIFF WBC: CPT | Performed by: INTERNAL MEDICINE

## 2023-01-09 PROCEDURE — 84443 ASSAY THYROID STIM HORMONE: CPT | Performed by: INTERNAL MEDICINE

## 2023-01-09 RX ORDER — ONDANSETRON 4 MG/1
4 TABLET, ORALLY DISINTEGRATING ORAL EVERY 8 HOURS PRN
Qty: 60 TABLET | Refills: 0 | Status: SHIPPED | OUTPATIENT
Start: 2023-01-09

## 2023-01-09 RX ORDER — MORPHINE SULFATE 15 MG/1
15 TABLET, FILM COATED, EXTENDED RELEASE ORAL EVERY 12 HOURS SCHEDULED
Qty: 60 TABLET | Refills: 0 | Status: SHIPPED | OUTPATIENT
Start: 2023-01-09 | End: 2023-02-08

## 2023-01-09 RX ORDER — HYDROCODONE BITARTRATE AND ACETAMINOPHEN 10; 325 MG/1; MG/1
1-2 TABLET ORAL EVERY 4 HOURS PRN
Qty: 120 TABLET | Refills: 0 | Status: SHIPPED | OUTPATIENT
Start: 2023-01-09

## 2023-01-09 RX ORDER — OMEPRAZOLE 40 MG/1
40 CAPSULE, DELAYED RELEASE ORAL DAILY
Qty: 90 CAPSULE | Refills: 3 | Status: SHIPPED | OUTPATIENT
Start: 2023-01-09 | End: 2024-01-04

## 2023-01-09 NOTE — TELEPHONE ENCOUNTER
From: Terrie Brandt.   Sent: 1/9/2023 8:47 AM CST  To: [facility] Clinical Staff  Subject: Dutch Raymundo     Can you please ask him If he can prescribe something different for pain the medicine he gave me does not help No Yes...

## 2023-01-09 NOTE — PROGRESS NOTES
Outpatient Oncology Care Plan   Problem list:   fatigue   Problems related to:   side effect of treatment   Interventions:   provided general teaching   Expected outcomes:   understands plan of care   Progress towards outcome: making progress     Education Record   Learner: Patient   Barriers / Limitations: None   Method: Discussion   Outcome: Shows understanding   Comments:  Patient here for follow-up and possible C1D1 Opdivo. Having indigestion and heartburn. Not taking any medications for it. Had PET scan on 1/4/23.  States abdominal pain is 8-9/10 despite taking norco.

## 2023-01-09 NOTE — PROGRESS NOTES
Pt here for C1D1. Arrives Ambulating independently, accompanied by Self           Pregnancy screening: Not applicable    Modifications in dose or schedule: No    Drugs/infusions dual verified for appearance and physical integrity. IV pump settings were dual verified: yes     Frequency of blood return and site check throughout administration: Prior to administration   Discharged to Home, Ambulating independently, accompanied by:Self    Outpatient Oncology Care Plan  Problem list:  knowledge deficit  Problems related to:  chemotherapy  Interventions:  encourage activity as tolerated  Expected outcomes:  understands plan of care  Progress towards outcome:  making progress    Education Record    Learner:  Patient  Barriers / Limitations:  none  Method:  Reinforcement  Outcome:  Shows understanding  Comments: patient tolerated treatment, no c/o. Discharged home in stable condition.

## 2023-01-10 ENCOUNTER — TELEPHONE (OUTPATIENT)
Dept: HEMATOLOGY/ONCOLOGY | Facility: HOSPITAL | Age: 53
End: 2023-01-10

## 2023-01-10 NOTE — TELEPHONE ENCOUNTER
Toxicities: C1 D1 Nivolumab on 9/1/2023    I attempted to reach Ronen to see how he is feeling after his first treatment. I left a voice mail message asking him to please return my call at his earliest convenience.

## 2023-01-11 DIAGNOSIS — Z78.9 ON TOTAL PARENTERAL NUTRITION (TPN): Primary | ICD-10-CM

## 2023-01-11 NOTE — TELEPHONE ENCOUNTER
There's nothing stronger than oxycodone in my scope of care, check with his Gi or Oncology surgeon, as he should not be in that much pain.

## 2023-01-11 NOTE — TELEPHONE ENCOUNTER
Heladiot sent advising pt to contact GI or Onc Surgeon if pain is still not controlled with oxycodone.

## 2023-01-16 ENCOUNTER — OFFICE VISIT (OUTPATIENT)
Dept: HEMATOLOGY/ONCOLOGY | Age: 53
End: 2023-01-16
Attending: INTERNAL MEDICINE
Payer: COMMERCIAL

## 2023-01-16 ENCOUNTER — NURSE ONLY (OUTPATIENT)
Dept: HEMATOLOGY/ONCOLOGY | Age: 53
End: 2023-01-16
Attending: INTERNAL MEDICINE
Payer: COMMERCIAL

## 2023-01-16 VITALS
OXYGEN SATURATION: 98 % | WEIGHT: 234.19 LBS | BODY MASS INDEX: 30.06 KG/M2 | SYSTOLIC BLOOD PRESSURE: 150 MMHG | TEMPERATURE: 97 F | HEIGHT: 74.02 IN | DIASTOLIC BLOOD PRESSURE: 87 MMHG | HEART RATE: 76 BPM

## 2023-01-16 DIAGNOSIS — C15.5 MALIGNANT NEOPLASM OF LOWER THIRD OF ESOPHAGUS (HCC): Primary | ICD-10-CM

## 2023-01-16 DIAGNOSIS — Z78.9 ON TOTAL PARENTERAL NUTRITION (TPN): ICD-10-CM

## 2023-01-16 LAB
ALBUMIN SERPL-MCNC: 2.9 G/DL (ref 3.4–5)
ALBUMIN/GLOB SERPL: 0.6 {RATIO} (ref 1–2)
ALP LIVER SERPL-CCNC: 119 U/L
ALT SERPL-CCNC: 29 U/L
ANION GAP SERPL CALC-SCNC: 5 MMOL/L (ref 0–18)
AST SERPL-CCNC: 20 U/L (ref 15–37)
BASOPHILS # BLD AUTO: 0.01 X10(3) UL (ref 0–0.2)
BASOPHILS NFR BLD AUTO: 0.3 %
BILIRUB SERPL-MCNC: 0.3 MG/DL (ref 0.1–2)
BUN BLD-MCNC: 21 MG/DL (ref 7–18)
CALCIUM BLD-MCNC: 9.4 MG/DL (ref 8.5–10.1)
CHLORIDE SERPL-SCNC: 102 MMOL/L (ref 98–112)
CO2 SERPL-SCNC: 28 MMOL/L (ref 21–32)
CREAT BLD-MCNC: 0.52 MG/DL
EOSINOPHIL # BLD AUTO: 0.13 X10(3) UL (ref 0–0.7)
EOSINOPHIL NFR BLD AUTO: 4 %
ERYTHROCYTE [DISTWIDTH] IN BLOOD BY AUTOMATED COUNT: 19.1 %
GFR SERPLBLD BASED ON 1.73 SQ M-ARVRAT: 121 ML/MIN/1.73M2 (ref 60–?)
GLOBULIN PLAS-MCNC: 4.5 G/DL (ref 2.8–4.4)
GLUCOSE BLD-MCNC: 134 MG/DL (ref 70–99)
HCT VFR BLD AUTO: 37.7 %
HGB BLD-MCNC: 11.6 G/DL
IMM GRANULOCYTES # BLD AUTO: 0.09 X10(3) UL (ref 0–1)
IMM GRANULOCYTES NFR BLD: 2.8 %
LYMPHOCYTES # BLD AUTO: 0.33 X10(3) UL (ref 1–4)
LYMPHOCYTES NFR BLD AUTO: 10.2 %
MAGNESIUM SERPL-MCNC: 1.9 MG/DL (ref 1.6–2.6)
MCH RBC QN AUTO: 27.6 PG (ref 26–34)
MCHC RBC AUTO-ENTMCNC: 30.8 G/DL (ref 31–37)
MCV RBC AUTO: 89.5 FL
MONOCYTES # BLD AUTO: 0.37 X10(3) UL (ref 0.1–1)
MONOCYTES NFR BLD AUTO: 11.4 %
NEUTROPHILS # BLD AUTO: 2.32 X10 (3) UL (ref 1.5–7.7)
NEUTROPHILS # BLD AUTO: 2.32 X10(3) UL (ref 1.5–7.7)
NEUTROPHILS NFR BLD AUTO: 71.3 %
OSMOLALITY SERPL CALC.SUM OF ELEC: 285 MOSM/KG (ref 275–295)
PHOSPHATE SERPL-MCNC: 4.4 MG/DL (ref 2.5–4.9)
PLATELET # BLD AUTO: 175 10(3)UL (ref 150–450)
POTASSIUM SERPL-SCNC: 4.2 MMOL/L (ref 3.5–5.1)
PROT SERPL-MCNC: 7.4 G/DL (ref 6.4–8.2)
RBC # BLD AUTO: 4.21 X10(6)UL
SODIUM SERPL-SCNC: 135 MMOL/L (ref 136–145)
TRIGL SERPL-MCNC: 209 MG/DL (ref 30–149)
WBC # BLD AUTO: 3.3 X10(3) UL (ref 4–11)

## 2023-01-16 PROCEDURE — 36591 DRAW BLOOD OFF VENOUS DEVICE: CPT

## 2023-01-16 PROCEDURE — 83735 ASSAY OF MAGNESIUM: CPT

## 2023-01-16 PROCEDURE — 84478 ASSAY OF TRIGLYCERIDES: CPT

## 2023-01-16 PROCEDURE — 99214 OFFICE O/P EST MOD 30 MIN: CPT | Performed by: NURSE PRACTITIONER

## 2023-01-16 PROCEDURE — 85025 COMPLETE CBC W/AUTO DIFF WBC: CPT

## 2023-01-16 PROCEDURE — 80053 COMPREHEN METABOLIC PANEL: CPT

## 2023-01-16 PROCEDURE — 84100 ASSAY OF PHOSPHORUS: CPT

## 2023-01-16 RX ORDER — POLYETHYLENE GLYCOL 3350 17 G/17G
17 POWDER, FOR SOLUTION ORAL DAILY
COMMUNITY

## 2023-01-17 ENCOUNTER — OFFICE VISIT (OUTPATIENT)
Dept: FAMILY MEDICINE CLINIC | Facility: CLINIC | Age: 53
End: 2023-01-17
Payer: COMMERCIAL

## 2023-01-17 VITALS
WEIGHT: 235 LBS | RESPIRATION RATE: 16 BRPM | BODY MASS INDEX: 30.16 KG/M2 | OXYGEN SATURATION: 99 % | HEART RATE: 69 BPM | HEIGHT: 74 IN

## 2023-01-17 DIAGNOSIS — F41.1 GAD (GENERALIZED ANXIETY DISORDER): ICD-10-CM

## 2023-01-17 DIAGNOSIS — E78.5 HYPERLIPIDEMIA WITH TARGET LOW DENSITY LIPOPROTEIN (LDL) CHOLESTEROL LESS THAN 70 MG/DL: ICD-10-CM

## 2023-01-17 DIAGNOSIS — Z00.00 ROUTINE GENERAL MEDICAL EXAMINATION AT HEALTH CARE FACILITY: Primary | ICD-10-CM

## 2023-01-17 DIAGNOSIS — Z12.5 SCREENING FOR PROSTATE CANCER: ICD-10-CM

## 2023-01-17 DIAGNOSIS — K94.33: ICD-10-CM

## 2023-01-17 DIAGNOSIS — C15.5 MALIGNANT NEOPLASM OF LOWER THIRD OF ESOPHAGUS (HCC): ICD-10-CM

## 2023-01-17 DIAGNOSIS — F33.1 MODERATE EPISODE OF RECURRENT MAJOR DEPRESSIVE DISORDER (HCC): ICD-10-CM

## 2023-01-17 PROCEDURE — 99396 PREV VISIT EST AGE 40-64: CPT | Performed by: FAMILY MEDICINE

## 2023-01-17 PROCEDURE — 3008F BODY MASS INDEX DOCD: CPT | Performed by: FAMILY MEDICINE

## 2023-01-17 PROCEDURE — 99214 OFFICE O/P EST MOD 30 MIN: CPT | Performed by: FAMILY MEDICINE

## 2023-01-17 RX ORDER — SERTRALINE HYDROCHLORIDE 100 MG/1
100 TABLET, FILM COATED ORAL DAILY
Qty: 30 TABLET | Refills: 3 | Status: SHIPPED | OUTPATIENT
Start: 2023-01-17

## 2023-01-22 PROBLEM — R11.10 INTRACTABLE VOMITING: Status: RESOLVED | Noted: 2022-12-24 | Resolved: 2023-01-22

## 2023-01-22 PROBLEM — R07.9 CHEST PAIN OF UNCERTAIN ETIOLOGY: Status: RESOLVED | Noted: 2022-12-24 | Resolved: 2023-01-22

## 2023-01-23 ENCOUNTER — OFFICE VISIT (OUTPATIENT)
Dept: HEMATOLOGY/ONCOLOGY | Age: 53
End: 2023-01-23
Attending: INTERNAL MEDICINE
Payer: COMMERCIAL

## 2023-01-23 ENCOUNTER — LAB ENCOUNTER (OUTPATIENT)
Dept: LAB | Age: 53
End: 2023-01-23
Attending: INTERNAL MEDICINE
Payer: COMMERCIAL

## 2023-01-23 VITALS
RESPIRATION RATE: 16 BRPM | SYSTOLIC BLOOD PRESSURE: 150 MMHG | HEIGHT: 74.02 IN | BODY MASS INDEX: 30.29 KG/M2 | HEART RATE: 88 BPM | WEIGHT: 236 LBS | OXYGEN SATURATION: 96 % | DIASTOLIC BLOOD PRESSURE: 92 MMHG | TEMPERATURE: 97 F

## 2023-01-23 DIAGNOSIS — C15.5 MALIGNANT NEOPLASM OF LOWER THIRD OF ESOPHAGUS (HCC): Primary | ICD-10-CM

## 2023-01-23 DIAGNOSIS — Z13.9 ENCOUNTER FOR SCREENING: ICD-10-CM

## 2023-01-23 LAB
ALBUMIN SERPL-MCNC: 3 G/DL (ref 3.4–5)
ALBUMIN/GLOB SERPL: 0.7 {RATIO} (ref 1–2)
ALP LIVER SERPL-CCNC: 117 U/L
ALT SERPL-CCNC: 45 U/L
ANION GAP SERPL CALC-SCNC: 9 MMOL/L (ref 0–18)
AST SERPL-CCNC: 27 U/L (ref 15–37)
BASOPHILS # BLD AUTO: 0.02 X10(3) UL (ref 0–0.2)
BASOPHILS NFR BLD AUTO: 0.4 %
BILIRUB SERPL-MCNC: 0.4 MG/DL (ref 0.1–2)
BUN BLD-MCNC: 23 MG/DL (ref 7–18)
CALCIUM BLD-MCNC: 9.3 MG/DL (ref 8.5–10.1)
CANCER AG19-9 SERPL-ACNC: 212.6 U/ML (ref ?–37)
CHLORIDE SERPL-SCNC: 102 MMOL/L (ref 98–112)
CO2 SERPL-SCNC: 26 MMOL/L (ref 21–32)
CREAT BLD-MCNC: 0.59 MG/DL
EOSINOPHIL # BLD AUTO: 0.25 X10(3) UL (ref 0–0.7)
EOSINOPHIL NFR BLD AUTO: 5.2 %
ERYTHROCYTE [DISTWIDTH] IN BLOOD BY AUTOMATED COUNT: 18.5 %
GFR SERPLBLD BASED ON 1.73 SQ M-ARVRAT: 116 ML/MIN/1.73M2 (ref 60–?)
GLOBULIN PLAS-MCNC: 4.4 G/DL (ref 2.8–4.4)
GLUCOSE BLD-MCNC: 134 MG/DL (ref 70–99)
HCT VFR BLD AUTO: 37.2 %
HGB BLD-MCNC: 11.8 G/DL
IMM GRANULOCYTES # BLD AUTO: 0.1 X10(3) UL (ref 0–1)
IMM GRANULOCYTES NFR BLD: 2.1 %
LYMPHOCYTES # BLD AUTO: 0.51 X10(3) UL (ref 1–4)
LYMPHOCYTES NFR BLD AUTO: 10.6 %
MCH RBC QN AUTO: 28.3 PG (ref 26–34)
MCHC RBC AUTO-ENTMCNC: 31.7 G/DL (ref 31–37)
MCV RBC AUTO: 89.2 FL
MONOCYTES # BLD AUTO: 0.45 X10(3) UL (ref 0.1–1)
MONOCYTES NFR BLD AUTO: 9.4 %
NEUTROPHILS # BLD AUTO: 3.46 X10 (3) UL (ref 1.5–7.7)
NEUTROPHILS # BLD AUTO: 3.46 X10(3) UL (ref 1.5–7.7)
NEUTROPHILS NFR BLD AUTO: 72.3 %
OSMOLALITY SERPL CALC.SUM OF ELEC: 290 MOSM/KG (ref 275–295)
PLATELET # BLD AUTO: 218 10(3)UL (ref 150–450)
POTASSIUM SERPL-SCNC: 4.2 MMOL/L (ref 3.5–5.1)
PROT SERPL-MCNC: 7.4 G/DL (ref 6.4–8.2)
RBC # BLD AUTO: 4.17 X10(6)UL
SODIUM SERPL-SCNC: 137 MMOL/L (ref 136–145)
WBC # BLD AUTO: 4.8 X10(3) UL (ref 4–11)

## 2023-01-23 PROCEDURE — 80053 COMPREHEN METABOLIC PANEL: CPT

## 2023-01-23 PROCEDURE — 99215 OFFICE O/P EST HI 40 MIN: CPT | Performed by: INTERNAL MEDICINE

## 2023-01-23 PROCEDURE — 96413 CHEMO IV INFUSION 1 HR: CPT

## 2023-01-23 PROCEDURE — 86301 IMMUNOASSAY TUMOR CA 19-9: CPT

## 2023-01-23 PROCEDURE — 85025 COMPLETE CBC W/AUTO DIFF WBC: CPT

## 2023-01-23 RX ORDER — HYDROCODONE BITARTRATE AND ACETAMINOPHEN 10; 325 MG/1; MG/1
1-2 TABLET ORAL EVERY 4 HOURS PRN
Qty: 120 TABLET | Refills: 0 | Status: SHIPPED | OUTPATIENT
Start: 2023-01-23

## 2023-01-23 NOTE — PROGRESS NOTES
Outpatient Oncology Care Plan   Problem list:   fatigue   Problems related to:   side effect of treatment   Interventions:   provided general teaching   Expected outcomes:   understands plan of care   Progress towards outcome: unchanged     Education Record   Learner: Patient   Barriers / Limitations: None   Method: Discussion   Outcome: Shows understanding   Comments:  Patient here for follow-up and planned C2D1 opdivo. Will have his lung biopsy this Thursday. Has some post nasal drip. Denies any additional symptoms.

## 2023-01-23 NOTE — PROGRESS NOTES
Education Record    Learner:  Patient    Disease / Diagnosis:pt her efor d1c2 opdivo    Barriers / Limitations:  None    Method:  Brief focused, printed material and  reinforcement    General Topics:  Plan of care reviewed    Outcome: pt tolerated treatment with no c/o.

## 2023-01-24 LAB — SARS-COV-2 RNA RESP QL NAA+PROBE: NOT DETECTED

## 2023-01-26 ENCOUNTER — APPOINTMENT (OUTPATIENT)
Dept: LAB | Facility: HOSPITAL | Age: 53
End: 2023-01-26
Attending: INTERNAL MEDICINE
Payer: COMMERCIAL

## 2023-01-26 ENCOUNTER — HOSPITAL ENCOUNTER (OUTPATIENT)
Dept: CT IMAGING | Facility: HOSPITAL | Age: 53
Discharge: HOME | End: 2023-01-26
Attending: INTERNAL MEDICINE
Payer: COMMERCIAL

## 2023-01-26 ENCOUNTER — HOSPITAL ENCOUNTER (OUTPATIENT)
Dept: GENERAL RADIOLOGY | Facility: HOSPITAL | Age: 53
Discharge: HOME | End: 2023-01-26
Attending: RADIOLOGY
Payer: COMMERCIAL

## 2023-01-26 VITALS
HEIGHT: 74 IN | BODY MASS INDEX: 30.16 KG/M2 | HEART RATE: 69 BPM | WEIGHT: 235 LBS | DIASTOLIC BLOOD PRESSURE: 85 MMHG | RESPIRATION RATE: 14 BRPM | SYSTOLIC BLOOD PRESSURE: 141 MMHG | OXYGEN SATURATION: 97 % | TEMPERATURE: 99 F

## 2023-01-26 DIAGNOSIS — R91.8 RIGHT LOWER LOBE LUNG MASS: ICD-10-CM

## 2023-01-26 DIAGNOSIS — R91.8 RIGHT LOWER LOBE LUNG MASS: Primary | ICD-10-CM

## 2023-01-26 LAB
INR BLD: 1.08 (ref 0.85–1.16)
PROTHROMBIN TIME: 14 SECONDS (ref 11.6–14.8)

## 2023-01-26 PROCEDURE — 88313 SPECIAL STAINS GROUP 2: CPT | Performed by: INTERNAL MEDICINE

## 2023-01-26 PROCEDURE — 85610 PROTHROMBIN TIME: CPT

## 2023-01-26 PROCEDURE — 88341 IMHCHEM/IMCYTCHM EA ADD ANTB: CPT | Performed by: INTERNAL MEDICINE

## 2023-01-26 PROCEDURE — 88305 TISSUE EXAM BY PATHOLOGIST: CPT | Performed by: INTERNAL MEDICINE

## 2023-01-26 PROCEDURE — 36591 DRAW BLOOD OFF VENOUS DEVICE: CPT

## 2023-01-26 PROCEDURE — 88342 IMHCHEM/IMCYTCHM 1ST ANTB: CPT | Performed by: INTERNAL MEDICINE

## 2023-01-26 PROCEDURE — 71045 X-RAY EXAM CHEST 1 VIEW: CPT | Performed by: RADIOLOGY

## 2023-01-26 PROCEDURE — 99152 MOD SED SAME PHYS/QHP 5/>YRS: CPT | Performed by: INTERNAL MEDICINE

## 2023-01-26 PROCEDURE — 32408 CORE NDL BX LNG/MED PERQ: CPT | Performed by: INTERNAL MEDICINE

## 2023-01-26 RX ORDER — NALOXONE HYDROCHLORIDE 0.4 MG/ML
80 INJECTION, SOLUTION INTRAMUSCULAR; INTRAVENOUS; SUBCUTANEOUS AS NEEDED
Status: DISCONTINUED | OUTPATIENT
Start: 2023-01-26 | End: 2023-01-28

## 2023-01-26 RX ORDER — MIDAZOLAM HYDROCHLORIDE 1 MG/ML
INJECTION INTRAMUSCULAR; INTRAVENOUS
Status: COMPLETED
Start: 2023-01-26 | End: 2023-01-26

## 2023-01-26 RX ORDER — FLUMAZENIL 0.1 MG/ML
0.2 INJECTION INTRAVENOUS AS NEEDED
Status: DISCONTINUED | OUTPATIENT
Start: 2023-01-26 | End: 2023-01-28

## 2023-01-26 RX ORDER — SODIUM CHLORIDE 9 MG/ML
INJECTION, SOLUTION INTRAVENOUS CONTINUOUS
Status: DISCONTINUED | OUTPATIENT
Start: 2023-01-26 | End: 2023-01-28

## 2023-01-26 RX ORDER — MIDAZOLAM HYDROCHLORIDE 1 MG/ML
1 INJECTION INTRAMUSCULAR; INTRAVENOUS EVERY 5 MIN PRN
Status: DISCONTINUED | OUTPATIENT
Start: 2023-01-26 | End: 2023-01-26

## 2023-01-26 RX ADMIN — MIDAZOLAM HYDROCHLORIDE 1 MG: 1 INJECTION INTRAMUSCULAR; INTRAVENOUS at 11:25:00

## 2023-01-26 RX ADMIN — MIDAZOLAM HYDROCHLORIDE 1 MG: 1 INJECTION INTRAMUSCULAR; INTRAVENOUS at 11:20:00

## 2023-01-26 RX ADMIN — MIDAZOLAM HYDROCHLORIDE 1 MG: 1 INJECTION INTRAMUSCULAR; INTRAVENOUS at 11:28:00

## 2023-01-26 RX ADMIN — SODIUM CHLORIDE: 9 INJECTION, SOLUTION INTRAVENOUS at 11:10:00

## 2023-01-26 NOTE — IMAGING NOTE
Received pt to radiology. Pt verified name and  as well as allergies. Pt states NPO since 1900 last night. Pt states he has held his blood thinner, aspirin, last dose 23. Lab results of PT/INR and PLT reviewed. Informed consent obtained by Dr. Lopez Horton. Pt positioned supine on scanner. CRM and pulse ox monitoring applied, alarms enabled. Sterile prep and 1% lido to area by Dr. Lopez Horton. Specimens obtained. Neosporin and tegaderm dressing applied to site. CDI. Pt positioned on [facility]. Pt awake and alert and conversing appropriately with this RN and in no apparent distress. SBAR called to [employer]. Pt transported to room [phone] with belongings. Pt accompanied by this RN and transporter.

## 2023-01-26 NOTE — DISCHARGE INSTRUCTIONS
[facility] Department of Radiology    Biopsy of any type    Dr. Damaris Mccoy    Have someone drive you home after your procedure. You may not drive yourself home    [address]    Take things easy for the rest of the day after your biopsy.   You may be sore in the biopsy area for one to five days  DO drink plenty of fluids  DO resume your regular diet  DO keep a bandage on the biopsy site for at least 24 hours  DO NOT take a hot bath or shower for at least 12 hours  DO NOT drive or operative machinery for at least 24 hours  DO NOT smoke for at least 24 hours  DO NOT do any strenuous exercise or lifting for at least 48 hours  DO call your doctor immediately if  You start bleeding  There is any change in color or temperature of the area where the biopsy was performed  You develop increasing pain in shortness of breath

## 2023-01-26 NOTE — INTERVAL H&P NOTE
The above referenced H&P was reviewed by Laureano Cortez MD on 1/26/2023, the patient was examined and no significant changes have occurred in the patient's condition since the H&P was performed. Risks, benefits, alternative treatments and consequences of no treatment were discussed. We will proceed with procedure as planned.       Laureano Cortez MD  1/26/2023  10:45 AM

## 2023-01-26 NOTE — INTERVAL H&P NOTE
The above referenced H&P was reviewed by Radha Galarza MD on 1/26/2023, the patient was examined and no significant changes have occurred in the patient's condition since the H&P was performed. Risks, benefits, alternative treatments and consequences of no treatment were discussed. We will proceed with procedure as planned.       Radha Galarza MD  1/26/2023  10:44 AM

## 2023-01-26 NOTE — PROCEDURES
[facility]  Procedure Note    Dutch Wilde.  Patient Status:  Outpatient    10/15/1970 MRN [id number]   [facility] CT Attending Adelina Aguilar MD    Day # 0 PCP Diana Ramirez MD     Procedure: CT guided lung biopsy    Pre-Procedure Diagnosis:  Right lung mass    Post-Procedure Diagnosis: same    Anesthesia:  Local and Sedation    Findings:  Solid cores    Specimens: 7 20 g cores    Blood Loss:  <5 ml    Tourniquet Time: n/a  Complications:  None  Drains:  none    Secondary Diagnosis:  none    Adeola Schuster MD  2023

## 2023-01-30 ENCOUNTER — NURSE ONLY (OUTPATIENT)
Dept: HEMATOLOGY/ONCOLOGY | Age: 53
End: 2023-01-30
Attending: INTERNAL MEDICINE
Payer: COMMERCIAL

## 2023-01-30 DIAGNOSIS — Z78.9 ON TOTAL PARENTERAL NUTRITION (TPN): ICD-10-CM

## 2023-01-30 LAB
ALBUMIN SERPL-MCNC: 3 G/DL (ref 3.4–5)
ALBUMIN/GLOB SERPL: 0.7 {RATIO} (ref 1–2)
ALP LIVER SERPL-CCNC: 128 U/L
ALT SERPL-CCNC: 50 U/L
ANION GAP SERPL CALC-SCNC: 4 MMOL/L (ref 0–18)
AST SERPL-CCNC: 25 U/L (ref 15–37)
BASOPHILS # BLD AUTO: 0.03 X10(3) UL (ref 0–0.2)
BASOPHILS NFR BLD AUTO: 0.5 %
BILIRUB SERPL-MCNC: 0.3 MG/DL (ref 0.1–2)
BUN BLD-MCNC: 20 MG/DL (ref 7–18)
CALCIUM BLD-MCNC: 9 MG/DL (ref 8.5–10.1)
CHLORIDE SERPL-SCNC: 103 MMOL/L (ref 98–112)
CO2 SERPL-SCNC: 28 MMOL/L (ref 21–32)
CREAT BLD-MCNC: 0.52 MG/DL
EOSINOPHIL # BLD AUTO: 0.14 X10(3) UL (ref 0–0.7)
EOSINOPHIL NFR BLD AUTO: 2.4 %
ERYTHROCYTE [DISTWIDTH] IN BLOOD BY AUTOMATED COUNT: 17.5 %
GFR SERPLBLD BASED ON 1.73 SQ M-ARVRAT: 121 ML/MIN/1.73M2 (ref 60–?)
GLOBULIN PLAS-MCNC: 4.3 G/DL (ref 2.8–4.4)
GLUCOSE BLD-MCNC: 113 MG/DL (ref 70–99)
HCT VFR BLD AUTO: 36.7 %
HGB BLD-MCNC: 11.7 G/DL
IMM GRANULOCYTES # BLD AUTO: 0.1 X10(3) UL (ref 0–1)
IMM GRANULOCYTES NFR BLD: 1.7 %
LYMPHOCYTES # BLD AUTO: 0.6 X10(3) UL (ref 1–4)
LYMPHOCYTES NFR BLD AUTO: 10.3 %
MAGNESIUM SERPL-MCNC: 1.7 MG/DL (ref 1.6–2.6)
MCH RBC QN AUTO: 28.7 PG (ref 26–34)
MCHC RBC AUTO-ENTMCNC: 31.9 G/DL (ref 31–37)
MCV RBC AUTO: 90 FL
MONOCYTES # BLD AUTO: 0.59 X10(3) UL (ref 0.1–1)
MONOCYTES NFR BLD AUTO: 10.2 %
NEUTROPHILS # BLD AUTO: 4.35 X10 (3) UL (ref 1.5–7.7)
NEUTROPHILS # BLD AUTO: 4.35 X10(3) UL (ref 1.5–7.7)
NEUTROPHILS NFR BLD AUTO: 74.9 %
OSMOLALITY SERPL CALC.SUM OF ELEC: 283 MOSM/KG (ref 275–295)
PHOSPHATE SERPL-MCNC: 3.9 MG/DL (ref 2.5–4.9)
PLATELET # BLD AUTO: 208 10(3)UL (ref 150–450)
POTASSIUM SERPL-SCNC: 4.3 MMOL/L (ref 3.5–5.1)
PROT SERPL-MCNC: 7.3 G/DL (ref 6.4–8.2)
RBC # BLD AUTO: 4.08 X10(6)UL
SODIUM SERPL-SCNC: 135 MMOL/L (ref 136–145)
TRIGL SERPL-MCNC: 124 MG/DL (ref 30–149)
WBC # BLD AUTO: 5.8 X10(3) UL (ref 4–11)

## 2023-01-30 PROCEDURE — 84478 ASSAY OF TRIGLYCERIDES: CPT

## 2023-01-30 PROCEDURE — 36591 DRAW BLOOD OFF VENOUS DEVICE: CPT

## 2023-01-30 PROCEDURE — 80053 COMPREHEN METABOLIC PANEL: CPT

## 2023-01-30 PROCEDURE — 83735 ASSAY OF MAGNESIUM: CPT

## 2023-01-30 PROCEDURE — 85025 COMPLETE CBC W/AUTO DIFF WBC: CPT

## 2023-01-30 PROCEDURE — 84100 ASSAY OF PHOSPHORUS: CPT

## 2023-01-31 ENCOUNTER — TELEPHONE (OUTPATIENT)
Dept: HEMATOLOGY/ONCOLOGY | Facility: HOSPITAL | Age: 53
End: 2023-01-31

## 2023-02-01 ENCOUNTER — DOCUMENTATION ONLY (OUTPATIENT)
Dept: HEMATOLOGY/ONCOLOGY | Facility: HOSPITAL | Age: 53
End: 2023-02-01

## 2023-02-01 ENCOUNTER — OFFICE VISIT (OUTPATIENT)
Dept: SURGERY | Facility: CLINIC | Age: 53
End: 2023-02-01
Payer: COMMERCIAL

## 2023-02-01 VITALS
HEART RATE: 80 BPM | BODY MASS INDEX: 30 KG/M2 | SYSTOLIC BLOOD PRESSURE: 145 MMHG | DIASTOLIC BLOOD PRESSURE: 78 MMHG | WEIGHT: 234 LBS | RESPIRATION RATE: 16 BRPM | OXYGEN SATURATION: 95 % | TEMPERATURE: 97 F

## 2023-02-01 DIAGNOSIS — K31.84 GASTROPARESIS: Primary | ICD-10-CM

## 2023-02-01 PROCEDURE — 3078F DIAST BP <80 MM HG: CPT | Performed by: PHYSICIAN ASSISTANT

## 2023-02-01 PROCEDURE — 3077F SYST BP >= 140 MM HG: CPT | Performed by: PHYSICIAN ASSISTANT

## 2023-02-01 PROCEDURE — 99212 OFFICE O/P EST SF 10 MIN: CPT | Performed by: PHYSICIAN ASSISTANT

## 2023-02-01 NOTE — PROGRESS NOTES
ONCOLOGY NUTRITION NOTE: as per PSR, pt requesting to cancel apt w/ RD on 2/6/23. RD cancelled.  RD continued available to assist in transitioning off TPN to 100% po diet pending pt/oncologist request.

## 2023-02-06 ENCOUNTER — APPOINTMENT (OUTPATIENT)
Dept: HEMATOLOGY/ONCOLOGY | Age: 53
End: 2023-02-06
Attending: INTERNAL MEDICINE
Payer: COMMERCIAL

## 2023-02-06 ENCOUNTER — OFFICE VISIT (OUTPATIENT)
Dept: HEMATOLOGY/ONCOLOGY | Age: 53
End: 2023-02-06
Attending: INTERNAL MEDICINE
Payer: COMMERCIAL

## 2023-02-06 ENCOUNTER — TELEPHONE (OUTPATIENT)
Dept: HEMATOLOGY/ONCOLOGY | Facility: HOSPITAL | Age: 53
End: 2023-02-06

## 2023-02-06 VITALS
HEART RATE: 68 BPM | OXYGEN SATURATION: 99 % | BODY MASS INDEX: 30.74 KG/M2 | TEMPERATURE: 97 F | WEIGHT: 239.5 LBS | DIASTOLIC BLOOD PRESSURE: 83 MMHG | SYSTOLIC BLOOD PRESSURE: 145 MMHG | HEIGHT: 74.02 IN

## 2023-02-06 DIAGNOSIS — J86.9 EMPYEMA (HCC): ICD-10-CM

## 2023-02-06 DIAGNOSIS — I10 ESSENTIAL HYPERTENSION: ICD-10-CM

## 2023-02-06 DIAGNOSIS — K91.89 ESOPHAGEAL ANASTOMOTIC LEAK: ICD-10-CM

## 2023-02-06 DIAGNOSIS — Z95.1 S/P CABG X 4: ICD-10-CM

## 2023-02-06 DIAGNOSIS — E66.01 MORBID OBESITY WITH BMI OF 40.0-44.9, ADULT (HCC): ICD-10-CM

## 2023-02-06 DIAGNOSIS — Z78.9 ON TOTAL PARENTERAL NUTRITION (TPN): ICD-10-CM

## 2023-02-06 DIAGNOSIS — C15.5 MALIGNANT NEOPLASM OF LOWER THIRD OF ESOPHAGUS (HCC): Primary | ICD-10-CM

## 2023-02-06 DIAGNOSIS — J93.12 SECONDARY SPONTANEOUS PNEUMOTHORAX: ICD-10-CM

## 2023-02-06 LAB
ALBUMIN SERPL-MCNC: 2.9 G/DL (ref 3.4–5)
ALBUMIN/GLOB SERPL: 0.7 {RATIO} (ref 1–2)
ALP LIVER SERPL-CCNC: 123 U/L
ALT SERPL-CCNC: 44 U/L
ANION GAP SERPL CALC-SCNC: 7 MMOL/L (ref 0–18)
AST SERPL-CCNC: 19 U/L (ref 15–37)
BASOPHILS # BLD AUTO: 0.02 X10(3) UL (ref 0–0.2)
BASOPHILS NFR BLD AUTO: 0.4 %
BILIRUB SERPL-MCNC: 0.2 MG/DL (ref 0.1–2)
BUN BLD-MCNC: 18 MG/DL (ref 7–18)
CALCIUM BLD-MCNC: 9.1 MG/DL (ref 8.5–10.1)
CANCER AG19-9 SERPL-ACNC: 284.5 U/ML (ref ?–37)
CHLORIDE SERPL-SCNC: 102 MMOL/L (ref 98–112)
CO2 SERPL-SCNC: 27 MMOL/L (ref 21–32)
CREAT BLD-MCNC: 0.48 MG/DL
EOSINOPHIL # BLD AUTO: 0.09 X10(3) UL (ref 0–0.7)
EOSINOPHIL NFR BLD AUTO: 1.6 %
ERYTHROCYTE [DISTWIDTH] IN BLOOD BY AUTOMATED COUNT: 16.8 %
GFR SERPLBLD BASED ON 1.73 SQ M-ARVRAT: 123 ML/MIN/1.73M2 (ref 60–?)
GLOBULIN PLAS-MCNC: 4.2 G/DL (ref 2.8–4.4)
GLUCOSE BLD-MCNC: 137 MG/DL (ref 70–99)
HCT VFR BLD AUTO: 35.3 %
HGB BLD-MCNC: 11.3 G/DL
IMM GRANULOCYTES # BLD AUTO: 0.13 X10(3) UL (ref 0–1)
IMM GRANULOCYTES NFR BLD: 2.3 %
LYMPHOCYTES # BLD AUTO: 0.54 X10(3) UL (ref 1–4)
LYMPHOCYTES NFR BLD AUTO: 9.5 %
MAGNESIUM SERPL-MCNC: 1.9 MG/DL (ref 1.6–2.6)
MCH RBC QN AUTO: 28.8 PG (ref 26–34)
MCHC RBC AUTO-ENTMCNC: 32 G/DL (ref 31–37)
MCV RBC AUTO: 90.1 FL
MONOCYTES # BLD AUTO: 0.48 X10(3) UL (ref 0.1–1)
MONOCYTES NFR BLD AUTO: 8.5 %
NEUTROPHILS # BLD AUTO: 4.41 X10 (3) UL (ref 1.5–7.7)
NEUTROPHILS # BLD AUTO: 4.41 X10(3) UL (ref 1.5–7.7)
NEUTROPHILS NFR BLD AUTO: 77.7 %
OSMOLALITY SERPL CALC.SUM OF ELEC: 286 MOSM/KG (ref 275–295)
PHOSPHATE SERPL-MCNC: 3.3 MG/DL (ref 2.5–4.9)
PLATELET # BLD AUTO: 210 10(3)UL (ref 150–450)
POTASSIUM SERPL-SCNC: 4.2 MMOL/L (ref 3.5–5.1)
PROT SERPL-MCNC: 7.1 G/DL (ref 6.4–8.2)
RBC # BLD AUTO: 3.92 X10(6)UL
SODIUM SERPL-SCNC: 136 MMOL/L (ref 136–145)
TRIGL SERPL-MCNC: 117 MG/DL (ref 30–149)
WBC # BLD AUTO: 5.7 X10(3) UL (ref 4–11)

## 2023-02-06 PROCEDURE — 80053 COMPREHEN METABOLIC PANEL: CPT

## 2023-02-06 PROCEDURE — 83735 ASSAY OF MAGNESIUM: CPT

## 2023-02-06 PROCEDURE — 86301 IMMUNOASSAY TUMOR CA 19-9: CPT

## 2023-02-06 PROCEDURE — 84478 ASSAY OF TRIGLYCERIDES: CPT

## 2023-02-06 PROCEDURE — 85025 COMPLETE CBC W/AUTO DIFF WBC: CPT

## 2023-02-06 PROCEDURE — 96413 CHEMO IV INFUSION 1 HR: CPT

## 2023-02-06 PROCEDURE — 84100 ASSAY OF PHOSPHORUS: CPT

## 2023-02-06 PROCEDURE — 99215 OFFICE O/P EST HI 40 MIN: CPT | Performed by: INTERNAL MEDICINE

## 2023-02-06 RX ORDER — ONDANSETRON 4 MG/1
4 TABLET, ORALLY DISINTEGRATING ORAL EVERY 8 HOURS PRN
Qty: 60 TABLET | Refills: 0 | Status: SHIPPED | OUTPATIENT
Start: 2023-02-06

## 2023-02-06 NOTE — PROGRESS NOTES
Education Record    Learner:  Patient    Disease / Diagnosis:pt here for d1c3 opdivo    Barriers / Limitations:  None    Method:  Brief focused, printed material and  reinforcement    General Topics:  Plan of care reviewed    Outcome: pt tolerated treatment with no c.o

## 2023-02-06 NOTE — PROGRESS NOTES
Outpatient Oncology Care Plan   Problem list:   fatigue   Problems related to:   side effect of treatment   Interventions:   provided general teaching   Expected outcomes:   understands plan of care   Progress towards outcome: making progress     Education Record   Learner: Patient   Barriers / Limitations: None   Method: Discussion   Outcome: Shows understanding   Comments:  Patient here for follow-up and planned C3D1 treatment. States he is maintaining weight and eating more by mouth. Will have EGD on 2/23. Still having some post nasal drip and congestion.

## 2023-02-07 ENCOUNTER — APPOINTMENT (OUTPATIENT)
Dept: RADIATION ONCOLOGY | Facility: HOSPITAL | Age: 53
End: 2023-02-07
Attending: INTERNAL MEDICINE
Payer: COMMERCIAL

## 2023-02-07 ENCOUNTER — MED REC SCAN ONLY (OUTPATIENT)
Dept: FAMILY MEDICINE CLINIC | Facility: CLINIC | Age: 53
End: 2023-02-07

## 2023-02-09 ENCOUNTER — TELEPHONE (OUTPATIENT)
Dept: SURGERY | Facility: CLINIC | Age: 53
End: 2023-02-09

## 2023-02-09 NOTE — TELEPHONE ENCOUNTER
Called to check on patient and see if any improvement in PO intake. If still having issues recommending UGI. Discussed case with Dr. Codie Hearn as well.

## 2023-02-10 ENCOUNTER — TELEPHONE (OUTPATIENT)
Dept: SURGERY | Facility: CLINIC | Age: 53
End: 2023-02-10

## 2023-02-10 NOTE — TELEPHONE ENCOUNTER
Patient returned call and states he is doing much better. He is tolerating diet and his TPN is currently being weaned. We will hold off on any imaging given improvement. He knows to call with any questions or concerns.

## 2023-02-13 ENCOUNTER — NURSE ONLY (OUTPATIENT)
Dept: HEMATOLOGY/ONCOLOGY | Age: 53
End: 2023-02-13
Attending: INTERNAL MEDICINE
Payer: COMMERCIAL

## 2023-02-13 ENCOUNTER — OFFICE VISIT (OUTPATIENT)
Dept: HEMATOLOGY/ONCOLOGY | Age: 53
End: 2023-02-13
Attending: INTERNAL MEDICINE
Payer: COMMERCIAL

## 2023-02-13 DIAGNOSIS — Z78.9 ON TOTAL PARENTERAL NUTRITION (TPN): ICD-10-CM

## 2023-02-13 LAB
ALBUMIN SERPL-MCNC: 3 G/DL (ref 3.4–5)
ALBUMIN/GLOB SERPL: 0.7 {RATIO} (ref 1–2)
ALP LIVER SERPL-CCNC: 124 U/L
ALT SERPL-CCNC: 42 U/L
ANION GAP SERPL CALC-SCNC: 6 MMOL/L (ref 0–18)
AST SERPL-CCNC: 20 U/L (ref 15–37)
BASOPHILS # BLD AUTO: 0.02 X10(3) UL (ref 0–0.2)
BASOPHILS NFR BLD AUTO: 0.4 %
BILIRUB SERPL-MCNC: 0.2 MG/DL (ref 0.1–2)
BUN BLD-MCNC: 14 MG/DL (ref 7–18)
CALCIUM BLD-MCNC: 9.2 MG/DL (ref 8.5–10.1)
CHLORIDE SERPL-SCNC: 103 MMOL/L (ref 98–112)
CO2 SERPL-SCNC: 28 MMOL/L (ref 21–32)
CREAT BLD-MCNC: 0.55 MG/DL
EOSINOPHIL # BLD AUTO: 0.08 X10(3) UL (ref 0–0.7)
EOSINOPHIL NFR BLD AUTO: 1.4 %
ERYTHROCYTE [DISTWIDTH] IN BLOOD BY AUTOMATED COUNT: 16.4 %
GFR SERPLBLD BASED ON 1.73 SQ M-ARVRAT: 119 ML/MIN/1.73M2 (ref 60–?)
GLOBULIN PLAS-MCNC: 4.1 G/DL (ref 2.8–4.4)
GLUCOSE BLD-MCNC: 113 MG/DL (ref 70–99)
HCT VFR BLD AUTO: 35.3 %
HGB BLD-MCNC: 11.2 G/DL
IMM GRANULOCYTES # BLD AUTO: 0.04 X10(3) UL (ref 0–1)
IMM GRANULOCYTES NFR BLD: 0.7 %
LYMPHOCYTES # BLD AUTO: 0.54 X10(3) UL (ref 1–4)
LYMPHOCYTES NFR BLD AUTO: 9.5 %
MAGNESIUM SERPL-MCNC: 1.9 MG/DL (ref 1.6–2.6)
MCH RBC QN AUTO: 29 PG (ref 26–34)
MCHC RBC AUTO-ENTMCNC: 31.7 G/DL (ref 31–37)
MCV RBC AUTO: 91.5 FL
MONOCYTES # BLD AUTO: 0.52 X10(3) UL (ref 0.1–1)
MONOCYTES NFR BLD AUTO: 9.2 %
NEUTROPHILS # BLD AUTO: 4.46 X10 (3) UL (ref 1.5–7.7)
NEUTROPHILS # BLD AUTO: 4.46 X10(3) UL (ref 1.5–7.7)
NEUTROPHILS NFR BLD AUTO: 78.8 %
OSMOLALITY SERPL CALC.SUM OF ELEC: 285 MOSM/KG (ref 275–295)
PHOSPHATE SERPL-MCNC: 4 MG/DL (ref 2.5–4.9)
PLATELET # BLD AUTO: 207 10(3)UL (ref 150–450)
POTASSIUM SERPL-SCNC: 4.1 MMOL/L (ref 3.5–5.1)
PROT SERPL-MCNC: 7.1 G/DL (ref 6.4–8.2)
RBC # BLD AUTO: 3.86 X10(6)UL
SODIUM SERPL-SCNC: 137 MMOL/L (ref 136–145)
TRIGL SERPL-MCNC: 167 MG/DL (ref 30–149)
WBC # BLD AUTO: 5.7 X10(3) UL (ref 4–11)

## 2023-02-13 PROCEDURE — 36591 DRAW BLOOD OFF VENOUS DEVICE: CPT

## 2023-02-13 PROCEDURE — 80053 COMPREHEN METABOLIC PANEL: CPT

## 2023-02-13 PROCEDURE — 85025 COMPLETE CBC W/AUTO DIFF WBC: CPT

## 2023-02-13 PROCEDURE — 84100 ASSAY OF PHOSPHORUS: CPT

## 2023-02-13 PROCEDURE — 83735 ASSAY OF MAGNESIUM: CPT

## 2023-02-13 PROCEDURE — 84478 ASSAY OF TRIGLYCERIDES: CPT

## 2023-02-14 ENCOUNTER — TELEPHONE (OUTPATIENT)
Dept: HEMATOLOGY/ONCOLOGY | Facility: HOSPITAL | Age: 53
End: 2023-02-14

## 2023-02-16 RX ORDER — CETIRIZINE HYDROCHLORIDE 10 MG/1
10 TABLET ORAL DAILY
COMMUNITY
End: 2023-02-20

## 2023-02-20 ENCOUNTER — OFFICE VISIT (OUTPATIENT)
Dept: HEMATOLOGY/ONCOLOGY | Age: 53
End: 2023-02-20
Attending: INTERNAL MEDICINE
Payer: COMMERCIAL

## 2023-02-20 ENCOUNTER — LAB ENCOUNTER (OUTPATIENT)
Dept: LAB | Age: 53
End: 2023-02-20
Attending: INTERNAL MEDICINE
Payer: COMMERCIAL

## 2023-02-20 VITALS
OXYGEN SATURATION: 95 % | HEIGHT: 74.02 IN | BODY MASS INDEX: 31.32 KG/M2 | HEART RATE: 78 BPM | RESPIRATION RATE: 16 BRPM | DIASTOLIC BLOOD PRESSURE: 90 MMHG | WEIGHT: 244 LBS | SYSTOLIC BLOOD PRESSURE: 137 MMHG | TEMPERATURE: 97 F

## 2023-02-20 DIAGNOSIS — Z95.1 S/P CABG X 4: ICD-10-CM

## 2023-02-20 DIAGNOSIS — K91.89 ESOPHAGEAL ANASTOMOTIC LEAK: ICD-10-CM

## 2023-02-20 DIAGNOSIS — Z78.9 ON TOTAL PARENTERAL NUTRITION (TPN): ICD-10-CM

## 2023-02-20 DIAGNOSIS — C15.9 MALIGNANT NEOPLASM OF ESOPHAGUS, UNSPECIFIED LOCATION (HCC): Primary | ICD-10-CM

## 2023-02-20 DIAGNOSIS — J86.9 EMPYEMA (HCC): ICD-10-CM

## 2023-02-20 DIAGNOSIS — I10 ESSENTIAL HYPERTENSION: ICD-10-CM

## 2023-02-20 DIAGNOSIS — C15.5 MALIGNANT NEOPLASM OF LOWER THIRD OF ESOPHAGUS (HCC): ICD-10-CM

## 2023-02-20 DIAGNOSIS — E66.01 MORBID OBESITY WITH BMI OF 40.0-44.9, ADULT (HCC): ICD-10-CM

## 2023-02-20 DIAGNOSIS — J93.12 SECONDARY SPONTANEOUS PNEUMOTHORAX: ICD-10-CM

## 2023-02-20 DIAGNOSIS — C15.5 MALIGNANT NEOPLASM OF LOWER THIRD OF ESOPHAGUS (HCC): Primary | ICD-10-CM

## 2023-02-20 LAB
ALBUMIN SERPL-MCNC: 3.1 G/DL (ref 3.4–5)
ALBUMIN/GLOB SERPL: 0.8 {RATIO} (ref 1–2)
ALP LIVER SERPL-CCNC: 122 U/L
ALT SERPL-CCNC: 42 U/L
ANION GAP SERPL CALC-SCNC: 6 MMOL/L (ref 0–18)
AST SERPL-CCNC: 20 U/L (ref 15–37)
BASOPHILS # BLD AUTO: 0.03 X10(3) UL (ref 0–0.2)
BASOPHILS NFR BLD AUTO: 0.5 %
BILIRUB SERPL-MCNC: 0.4 MG/DL (ref 0.1–2)
BUN BLD-MCNC: 18 MG/DL (ref 7–18)
CALCIUM BLD-MCNC: 9.2 MG/DL (ref 8.5–10.1)
CANCER AG19-9 SERPL-ACNC: 357.3 U/ML (ref ?–37)
CHLORIDE SERPL-SCNC: 105 MMOL/L (ref 98–112)
CO2 SERPL-SCNC: 27 MMOL/L (ref 21–32)
CREAT BLD-MCNC: 0.68 MG/DL
EOSINOPHIL # BLD AUTO: 0.11 X10(3) UL (ref 0–0.7)
EOSINOPHIL NFR BLD AUTO: 1.8 %
ERYTHROCYTE [DISTWIDTH] IN BLOOD BY AUTOMATED COUNT: 16 %
GFR SERPLBLD BASED ON 1.73 SQ M-ARVRAT: 111 ML/MIN/1.73M2 (ref 60–?)
GLOBULIN PLAS-MCNC: 4.1 G/DL (ref 2.8–4.4)
GLUCOSE BLD-MCNC: 129 MG/DL (ref 70–99)
HCT VFR BLD AUTO: 36.5 %
HGB BLD-MCNC: 11.6 G/DL
IMM GRANULOCYTES # BLD AUTO: 0.1 X10(3) UL (ref 0–1)
IMM GRANULOCYTES NFR BLD: 1.6 %
LYMPHOCYTES # BLD AUTO: 0.63 X10(3) UL (ref 1–4)
LYMPHOCYTES NFR BLD AUTO: 10.1 %
MAGNESIUM SERPL-MCNC: 1.9 MG/DL (ref 1.6–2.6)
MCH RBC QN AUTO: 29.3 PG (ref 26–34)
MCHC RBC AUTO-ENTMCNC: 31.8 G/DL (ref 31–37)
MCV RBC AUTO: 92.2 FL
MONOCYTES # BLD AUTO: 0.66 X10(3) UL (ref 0.1–1)
MONOCYTES NFR BLD AUTO: 10.6 %
NEUTROPHILS # BLD AUTO: 4.68 X10 (3) UL (ref 1.5–7.7)
NEUTROPHILS # BLD AUTO: 4.68 X10(3) UL (ref 1.5–7.7)
NEUTROPHILS NFR BLD AUTO: 75.4 %
OSMOLALITY SERPL CALC.SUM OF ELEC: 290 MOSM/KG (ref 275–295)
PHOSPHATE SERPL-MCNC: 4.5 MG/DL (ref 2.5–4.9)
PLATELET # BLD AUTO: 243 10(3)UL (ref 150–450)
POTASSIUM SERPL-SCNC: 4 MMOL/L (ref 3.5–5.1)
PROT SERPL-MCNC: 7.2 G/DL (ref 6.4–8.2)
RBC # BLD AUTO: 3.96 X10(6)UL
SODIUM SERPL-SCNC: 138 MMOL/L (ref 136–145)
T4 FREE SERPL-MCNC: 0.9 NG/DL (ref 0.8–1.7)
TRIGL SERPL-MCNC: 158 MG/DL (ref 30–149)
TSI SER-ACNC: 0.19 MIU/ML (ref 0.36–3.74)
WBC # BLD AUTO: 6.2 X10(3) UL (ref 4–11)

## 2023-02-20 PROCEDURE — 84100 ASSAY OF PHOSPHORUS: CPT

## 2023-02-20 PROCEDURE — 84443 ASSAY THYROID STIM HORMONE: CPT

## 2023-02-20 PROCEDURE — 99215 OFFICE O/P EST HI 40 MIN: CPT | Performed by: INTERNAL MEDICINE

## 2023-02-20 PROCEDURE — 86301 IMMUNOASSAY TUMOR CA 19-9: CPT

## 2023-02-20 PROCEDURE — 96413 CHEMO IV INFUSION 1 HR: CPT

## 2023-02-20 PROCEDURE — 84478 ASSAY OF TRIGLYCERIDES: CPT

## 2023-02-20 PROCEDURE — 83735 ASSAY OF MAGNESIUM: CPT

## 2023-02-20 PROCEDURE — 80053 COMPREHEN METABOLIC PANEL: CPT

## 2023-02-20 PROCEDURE — 84439 ASSAY OF FREE THYROXINE: CPT

## 2023-02-20 PROCEDURE — 85025 COMPLETE CBC W/AUTO DIFF WBC: CPT

## 2023-02-20 RX ORDER — ONDANSETRON 8 MG/1
8 TABLET, ORALLY DISINTEGRATING ORAL EVERY 8 HOURS PRN
Qty: 60 TABLET | Refills: 0 | Status: SHIPPED | OUTPATIENT
Start: 2023-02-20

## 2023-02-20 RX ORDER — CETIRIZINE HYDROCHLORIDE 10 MG/1
10 TABLET ORAL DAILY
COMMUNITY

## 2023-02-20 NOTE — PROGRESS NOTES
Education Record    Learner:  Patient/ spouse    Disease / [facility]  OTV D1C4 Nivolumab    Barriers / Limitations:  None   Comments:    Method:  Discussion   Comments:    General Topics:  Plan of care reviewed   Comments:    Outcome:  Shows understanding   Comments:  Scope scheduled for 2/23    Noted a tremor to hands. Diarrhea for a few days after treatment. Takes zofran every 8 hours the entire time. appetite is good.

## 2023-02-20 NOTE — PROGRESS NOTES
Education Record    Learner:  Patient    Disease / Diagnosis:pt here for opdivo    Barriers / Limitations:  None    Method:  Brief focused, printed material and  reinforcement    General Topics:  Plan of care reviewed    Outcome: pt tolerated treatment with no c/o. Pt will call to schedule needle change if needed for tpn. If pt able to eat enough, he said he will not need tpn next week.

## 2023-02-21 ENCOUNTER — SOCIAL WORK SERVICES (OUTPATIENT)
Dept: HEMATOLOGY/ONCOLOGY | Facility: HOSPITAL | Age: 53
End: 2023-02-21

## 2023-02-21 LAB — SARS-COV-2 RNA RESP QL NAA+PROBE: NOT DETECTED

## 2023-02-21 NOTE — PROGRESS NOTES
MARIZA assisted with forms. Completed coverage forms faxed to fax [phone]. MARIZA sent a copy and confirmation to pt via [employer]. SHARON TompkinsW   at [address], [address] [place] [address] [place]  Ph: [phone]. [email or web address]  Fax: [phone]

## 2023-02-22 DIAGNOSIS — J86.9 EMPYEMA (HCC): Primary | ICD-10-CM

## 2023-02-22 DIAGNOSIS — D71 GRANULOMATOUS DISEASE (HCC): ICD-10-CM

## 2023-02-23 ENCOUNTER — ANESTHESIA EVENT (OUTPATIENT)
Dept: ENDOSCOPY | Facility: HOSPITAL | Age: 53
End: 2023-02-23
Payer: COMMERCIAL

## 2023-02-23 ENCOUNTER — HOSPITAL ENCOUNTER (OUTPATIENT)
Facility: HOSPITAL | Age: 53
Setting detail: HOSPITAL OUTPATIENT SURGERY
Discharge: HOME | End: 2023-02-23
Attending: INTERNAL MEDICINE | Admitting: INTERNAL MEDICINE
Payer: COMMERCIAL

## 2023-02-23 ENCOUNTER — TELEPHONE (OUTPATIENT)
Dept: HEMATOLOGY/ONCOLOGY | Facility: HOSPITAL | Age: 53
End: 2023-02-23

## 2023-02-23 ENCOUNTER — ANESTHESIA (OUTPATIENT)
Dept: ENDOSCOPY | Facility: HOSPITAL | Age: 53
End: 2023-02-23
Payer: COMMERCIAL

## 2023-02-23 VITALS
TEMPERATURE: 98 F | HEIGHT: 74 IN | DIASTOLIC BLOOD PRESSURE: 85 MMHG | SYSTOLIC BLOOD PRESSURE: 146 MMHG | RESPIRATION RATE: 20 BRPM | OXYGEN SATURATION: 100 % | BODY MASS INDEX: 30.8 KG/M2 | HEART RATE: 66 BPM | WEIGHT: 240 LBS

## 2023-02-23 DIAGNOSIS — C15.5 MALIGNANT NEOPLASM OF LOWER THIRD OF ESOPHAGUS (HCC): Primary | ICD-10-CM

## 2023-02-23 PROCEDURE — 0DB48ZX EXCISION OF ESOPHAGOGASTRIC JUNCTION, VIA NATURAL OR ARTIFICIAL OPENING ENDOSCOPIC, DIAGNOSTIC: ICD-10-PCS | Performed by: INTERNAL MEDICINE

## 2023-02-23 PROCEDURE — 88305 TISSUE EXAM BY PATHOLOGIST: CPT | Performed by: INTERNAL MEDICINE

## 2023-02-23 RX ORDER — HEPARIN SODIUM,PORCINE 10 UNIT/ML
10 VIAL (ML) INTRAVENOUS AS NEEDED
Status: DISCONTINUED | OUTPATIENT
Start: 2023-02-23 | End: 2023-02-23

## 2023-02-23 RX ORDER — SODIUM CHLORIDE, SODIUM LACTATE, POTASSIUM CHLORIDE, CALCIUM CHLORIDE 600; 310; 30; 20 MG/100ML; MG/100ML; MG/100ML; MG/100ML
INJECTION, SOLUTION INTRAVENOUS CONTINUOUS
Status: DISCONTINUED | OUTPATIENT
Start: 2023-02-23 | End: 2023-02-23

## 2023-02-23 RX ORDER — SERTRALINE HYDROCHLORIDE 100 MG/1
100 TABLET, FILM COATED ORAL DAILY
COMMUNITY

## 2023-02-23 RX ADMIN — SODIUM CHLORIDE, SODIUM LACTATE, POTASSIUM CHLORIDE, CALCIUM CHLORIDE: 600; 310; 30; 20 INJECTION, SOLUTION INTRAVENOUS at 12:51:00

## 2023-02-23 RX ADMIN — SODIUM CHLORIDE, SODIUM LACTATE, POTASSIUM CHLORIDE, CALCIUM CHLORIDE: 600; 310; 30; 20 INJECTION, SOLUTION INTRAVENOUS at 13:03:00

## 2023-02-23 NOTE — TELEPHONE ENCOUNTER
ONCOLOGY DIETITIAN PHONE VISIT. Pt reaching out to RD noting visit went well w/ Dr. Annika Bentley today for EGD; pt noted, \"All is good. \"     Pt noted tolerated po intake well w/ ~ 2200 kcals on Tuesday of this week and ~ 9508-9176 kcals on Wednesday. Pt requesting to discontinue TPN. RD congratulated pt on progress and noted would alert medical/surgical team of above. [facility] will need MD orders to discontinue TPN as well.

## 2023-02-23 NOTE — DISCHARGE INSTRUCTIONS
Home Care Instructions for Colonoscopy and/or Gastroscopy With Sedation    Diet:  - Resume your regular diet as tolerated unless otherwise instructed. - start with light meals to minimize bloating.  - Do not drink alcohol today. Medication:  - If you have questions about resuming your normal medications, please contact your Primary Care Physician. Activities:  - Take it easy today. Do not return to work today. - Do not drive today. - Do not operate any machinery today (including kitchen equipment). Gastroscopy:  - You may have a sore throat for 2-3 days following the exam. This is normal. Gargling with warm salt water (1/2 tsp salt to 1 glass warm water) or using throat lozenges will help. - If you experience any sharp pain in your neck, abdomen or chest, vomiting of blood, oral temperature over 100 degrees Farenheit, light-headedness or dizziness, or any other problems, contact your doctor. **If unable to reach your doctor, please go to the [facility] Emergency Room**    - Your referring physician will receive a full report of your examination.  - If you do not hear from your doctor's office within two weeks of your biopsy, please call them for your results.     Additional Comments/Instructions (if applicable):

## 2023-02-27 ENCOUNTER — APPOINTMENT (OUTPATIENT)
Dept: HEMATOLOGY/ONCOLOGY | Age: 53
End: 2023-02-27
Attending: INTERNAL MEDICINE
Payer: COMMERCIAL

## 2023-02-28 ENCOUNTER — TELEPHONE (OUTPATIENT)
Dept: HEMATOLOGY/ONCOLOGY | Facility: HOSPITAL | Age: 53
End: 2023-02-28

## 2023-02-28 ENCOUNTER — TELEPHONE (OUTPATIENT)
Dept: SURGERY | Facility: CLINIC | Age: 53
End: 2023-02-28

## 2023-02-28 ENCOUNTER — HOSPITAL ENCOUNTER (OUTPATIENT)
Dept: CT IMAGING | Age: 53
Discharge: HOME | End: 2023-02-28
Attending: INTERNAL MEDICINE
Payer: COMMERCIAL

## 2023-02-28 ENCOUNTER — PATIENT MESSAGE (OUTPATIENT)
Dept: FAMILY MEDICINE CLINIC | Facility: CLINIC | Age: 53
End: 2023-02-28

## 2023-02-28 DIAGNOSIS — C15.9 MALIGNANT NEOPLASM OF ESOPHAGUS, UNSPECIFIED LOCATION (HCC): ICD-10-CM

## 2023-02-28 DIAGNOSIS — J86.9 EMPYEMA (HCC): Primary | ICD-10-CM

## 2023-02-28 DIAGNOSIS — K91.89 ESOPHAGEAL ANASTOMOTIC LEAK: ICD-10-CM

## 2023-02-28 PROCEDURE — 74177 CT ABD & PELVIS W/CONTRAST: CPT | Performed by: INTERNAL MEDICINE

## 2023-02-28 PROCEDURE — 71260 CT THORAX DX C+: CPT | Performed by: INTERNAL MEDICINE

## 2023-02-28 NOTE — TELEPHONE ENCOUNTER
Called [facility] and gave verbal order to discontinue TPN. Notified patient and pt stated understanding.

## 2023-03-03 ENCOUNTER — SOCIAL WORK SERVICES (OUTPATIENT)
Dept: HEMATOLOGY/ONCOLOGY | Facility: HOSPITAL | Age: 53
End: 2023-03-03

## 2023-03-03 NOTE — PROGRESS NOTES
MARIZA faxed updated notes and Physician's statement to [facility] fax [phone]. SHARON CanoW   at [address], [place], [address] [place]  Ph: [phone]. [email or web address]  Fax: [phone]

## 2023-03-05 NOTE — PROGRESS NOTES
Date: 3/5/2023    To: Zayra Aguilar. : 10/15/1970     I hope this letter finds you doing well. I am writing to inform you of the following: The biopsies obtained at the time of your recent endoscopic procedure were benign and showed no evidence of infection or malignancy. Please call the office at [phone] if there are any questions.     Mega Tolbert M.D.

## 2023-03-06 ENCOUNTER — OFFICE VISIT (OUTPATIENT)
Dept: HEMATOLOGY/ONCOLOGY | Age: 53
End: 2023-03-06
Attending: INTERNAL MEDICINE
Payer: COMMERCIAL

## 2023-03-06 ENCOUNTER — SOCIAL WORK SERVICES (OUTPATIENT)
Dept: HEMATOLOGY/ONCOLOGY | Facility: HOSPITAL | Age: 53
End: 2023-03-06

## 2023-03-06 VITALS
HEART RATE: 62 BPM | DIASTOLIC BLOOD PRESSURE: 89 MMHG | HEIGHT: 74.02 IN | RESPIRATION RATE: 16 BRPM | OXYGEN SATURATION: 98 % | TEMPERATURE: 98 F | WEIGHT: 246 LBS | BODY MASS INDEX: 31.57 KG/M2 | SYSTOLIC BLOOD PRESSURE: 169 MMHG

## 2023-03-06 DIAGNOSIS — C15.9 MALIGNANT NEOPLASM OF ESOPHAGUS, UNSPECIFIED LOCATION (HCC): ICD-10-CM

## 2023-03-06 DIAGNOSIS — J93.12 SECONDARY SPONTANEOUS PNEUMOTHORAX: ICD-10-CM

## 2023-03-06 DIAGNOSIS — K91.89 ESOPHAGEAL ANASTOMOTIC LEAK: ICD-10-CM

## 2023-03-06 DIAGNOSIS — I10 ESSENTIAL HYPERTENSION: ICD-10-CM

## 2023-03-06 DIAGNOSIS — J86.9 EMPYEMA (HCC): ICD-10-CM

## 2023-03-06 DIAGNOSIS — C15.5 MALIGNANT NEOPLASM OF LOWER THIRD OF ESOPHAGUS (HCC): Primary | ICD-10-CM

## 2023-03-06 DIAGNOSIS — Z78.9 ON TOTAL PARENTERAL NUTRITION (TPN): ICD-10-CM

## 2023-03-06 DIAGNOSIS — E66.01 MORBID OBESITY WITH BMI OF 40.0-44.9, ADULT (HCC): ICD-10-CM

## 2023-03-06 LAB
ALBUMIN SERPL-MCNC: 3.4 G/DL (ref 3.4–5)
ALBUMIN/GLOB SERPL: 0.9 {RATIO} (ref 1–2)
ALP LIVER SERPL-CCNC: 99 U/L
ALT SERPL-CCNC: 39 U/L
ANION GAP SERPL CALC-SCNC: 9 MMOL/L (ref 0–18)
AST SERPL-CCNC: 24 U/L (ref 15–37)
BASOPHILS # BLD AUTO: 0.02 X10(3) UL (ref 0–0.2)
BASOPHILS NFR BLD AUTO: 0.4 %
BILIRUB SERPL-MCNC: 0.3 MG/DL (ref 0.1–2)
BUN BLD-MCNC: 10 MG/DL (ref 7–18)
CALCIUM BLD-MCNC: 9 MG/DL (ref 8.5–10.1)
CANCER AG19-9 SERPL-ACNC: 565.5 U/ML (ref ?–37)
CHLORIDE SERPL-SCNC: 107 MMOL/L (ref 98–112)
CO2 SERPL-SCNC: 23 MMOL/L (ref 21–32)
CREAT BLD-MCNC: 0.69 MG/DL
EOSINOPHIL # BLD AUTO: 0.16 X10(3) UL (ref 0–0.7)
EOSINOPHIL NFR BLD AUTO: 3 %
ERYTHROCYTE [DISTWIDTH] IN BLOOD BY AUTOMATED COUNT: 15.2 %
GFR SERPLBLD BASED ON 1.73 SQ M-ARVRAT: 111 ML/MIN/1.73M2 (ref 60–?)
GLOBULIN PLAS-MCNC: 3.9 G/DL (ref 2.8–4.4)
GLUCOSE BLD-MCNC: 115 MG/DL (ref 70–99)
HCT VFR BLD AUTO: 38.6 %
HGB BLD-MCNC: 12.1 G/DL
IMM GRANULOCYTES # BLD AUTO: 0.03 X10(3) UL (ref 0–1)
IMM GRANULOCYTES NFR BLD: 0.6 %
LYMPHOCYTES # BLD AUTO: 0.66 X10(3) UL (ref 1–4)
LYMPHOCYTES NFR BLD AUTO: 12.3 %
MAGNESIUM SERPL-MCNC: 1.9 MG/DL (ref 1.6–2.6)
MCH RBC QN AUTO: 29.4 PG (ref 26–34)
MCHC RBC AUTO-ENTMCNC: 31.3 G/DL (ref 31–37)
MCV RBC AUTO: 93.9 FL
MONOCYTES # BLD AUTO: 0.52 X10(3) UL (ref 0.1–1)
MONOCYTES NFR BLD AUTO: 9.7 %
NEUTROPHILS # BLD AUTO: 3.96 X10 (3) UL (ref 1.5–7.7)
NEUTROPHILS # BLD AUTO: 3.96 X10(3) UL (ref 1.5–7.7)
NEUTROPHILS NFR BLD AUTO: 74 %
OSMOLALITY SERPL CALC.SUM OF ELEC: 288 MOSM/KG (ref 275–295)
PHOSPHATE SERPL-MCNC: 3.8 MG/DL (ref 2.5–4.9)
PLATELET # BLD AUTO: 203 10(3)UL (ref 150–450)
POTASSIUM SERPL-SCNC: 4.1 MMOL/L (ref 3.5–5.1)
PROT SERPL-MCNC: 7.3 G/DL (ref 6.4–8.2)
RBC # BLD AUTO: 4.11 X10(6)UL
SODIUM SERPL-SCNC: 139 MMOL/L (ref 136–145)
WBC # BLD AUTO: 5.4 X10(3) UL (ref 4–11)

## 2023-03-06 PROCEDURE — 83735 ASSAY OF MAGNESIUM: CPT

## 2023-03-06 PROCEDURE — 86301 IMMUNOASSAY TUMOR CA 19-9: CPT

## 2023-03-06 PROCEDURE — 84100 ASSAY OF PHOSPHORUS: CPT

## 2023-03-06 PROCEDURE — 85025 COMPLETE CBC W/AUTO DIFF WBC: CPT

## 2023-03-06 PROCEDURE — 80053 COMPREHEN METABOLIC PANEL: CPT

## 2023-03-06 PROCEDURE — 96413 CHEMO IV INFUSION 1 HR: CPT

## 2023-03-06 RX ORDER — ONDANSETRON 8 MG/1
8 TABLET, ORALLY DISINTEGRATING ORAL EVERY 8 HOURS PRN
Qty: 60 TABLET | Refills: 0 | Status: SHIPPED | OUTPATIENT
Start: 2023-03-06

## 2023-03-06 NOTE — PROGRESS NOTES
Outpatient Oncology Care Plan  Problem list:  diarrhea  respiratory  fatigue  knowledge deficit  nausea and vomiting    Problems related to:    chemotherapy  combined modality therapy    Interventions:  provided general teaching    Expected outcomes:  understands plan of care    Progress towards outcome:  making progress    Education Record    Learner:  Patient  Barriers / Limitations:  None  Method:  Brief focused  Outcome:  Shows understanding  Comments:OTV D1C5 opdivo expected. Pt states his energy is better. Reports cough from post nasal drip. Report vomiting and diarrhea yesterday and this morning. States he thinks it was something he ate.

## 2023-03-06 NOTE — PROGRESS NOTES
SW completed an assessment with pt to provide support and encouragement. Chart reviewed and distress screening of 6 noted. Pt lives in [place], [place]. Social determinants of health assessment completed with pt and no needs identified at this time. Pt presents with bright affect and mood. SW reviewed cancer support resources including [facility] and [facility] Resources. SW provided a packet of resources including information on transportation resources, homecare/home health agencies, Facebook support group page and counseling resources. SW reviewed Advance Directives and importance of completion. SW explained role of SW in [place] and provided support as pt shared feelings and thoughts on cancer diagnosis. SW contact information given. SW provided a [employer] bag which pt was very grateful for. Coping skills reviewed and support services encouraged due to cancer dx. Pt provided additional paperwork to be completed. SW to review and confirm that paperwork is not the same that was sent on 3/3/23. Frank Veronica LCSW   at [employer] [address], [address] [place] [address], [address] [place] [address] [place] [address] [place] [address] Joe Perdomo  Ph: [phone]. [email or web address]  Fax: [phone]

## 2023-03-06 NOTE — PROGRESS NOTES
Pt here for C5D1. Pt seen by MD today. Arrives Ambulating independently, accompanied by Self           Pregnancy screening: Not applicable    Modifications in dose or schedule: Yes Pt changed to Opdivo 480mg every 4 weeks. Drugs/infusions dual verified for appearance and physical integrity. IV pump settings were dual verified: yes     Frequency of blood return and site check throughout administration: Prior to administration and At completion of therapy   Discharged to Home, Ambulating independently, accompanied by:Self    Outpatient Oncology Care Plan  Problem list:  knowledge deficit  Problems related to:  chemotherapy  side effect of treatment  Interventions:  provided general teaching  Expected outcomes:  understands plan of care  Progress towards outcome:  making progress    Education Record    Learner:  Patient  Barriers / Limitations:  None  Method:  Brief focused  Outcome:  Shows understanding  Comments: Pt tolerated infusion. Pt dc home ambulatory in stable condition, no complaints. Will get fu appt from [address]

## 2023-03-07 ENCOUNTER — SOCIAL WORK SERVICES (OUTPATIENT)
Dept: HEMATOLOGY/ONCOLOGY | Facility: HOSPITAL | Age: 53
End: 2023-03-07

## 2023-03-07 ENCOUNTER — NURSE ONLY (OUTPATIENT)
Dept: HEMATOLOGY/ONCOLOGY | Age: 53
End: 2023-03-07
Attending: INTERNAL MEDICINE
Payer: COMMERCIAL

## 2023-03-07 LAB
T4 FREE SERPL-MCNC: 0.9 NG/DL (ref 0.8–1.7)
TSI SER-ACNC: 0.61 MIU/ML (ref 0.36–3.74)

## 2023-03-07 PROCEDURE — 36591 DRAW BLOOD OFF VENOUS DEVICE: CPT

## 2023-03-07 NOTE — PROGRESS NOTES
MARIZA assisted with updated disability paperwork to [facility]. Paperwork was faxed to [phone]. MARIZA sent a copy to pt via [employer]. Feroz Lozoya, [facility]   at [facility]    [address], [address] [place] [address], [address] [place]   Malou Busby [address] [place] [address], [address] [place] [address] [place]  Ph: [phone]. [email or web address]  Fax: [phone]

## 2023-03-08 ENCOUNTER — PATIENT OUTREACH (OUTPATIENT)
Dept: INFECTIOUS DISEASE | Facility: CLINIC | Age: 53
End: 2023-03-08

## 2023-03-08 NOTE — PROGRESS NOTES
SP esophagectomy 9.30.22 for CA, Esophageal leak with right sided empyema, SP endo procedure 10/18  Completed abx for pleural infection 11/9, and then also was on ivabx next month for pneumonia  Recently underwent CT guided lung biopsy on 1/26/23 to evaluate new area of consolidation vs metastatic disease seen on CT 12/24/22. Path showed \"peculiar histiocytic infiltrate\" that was not characteristic for classic granuloma. NO malignancy. Patient has no fever, cough, respiratory symptoms  Given relatively recent pulmonary infection, and resolution of symptoms, acute infection is not apparently obvious.        Could hold off on starting any empiric abx and await serial imaging, at the discretion of oncology or pulmonary

## 2023-03-10 ENCOUNTER — SOCIAL WORK SERVICES (OUTPATIENT)
Dept: HEMATOLOGY/ONCOLOGY | Facility: HOSPITAL | Age: 53
End: 2023-03-10

## 2023-03-10 NOTE — PROGRESS NOTES
MARIZA faxed medical record (127 pages) from 9/30/22 to present per request of [employer] for pt's disability claim. Faxed to [facility] fax [phone]. Dearl SHARON UrbanW   at [address], [address] [place] [address], [address] [place] [address] Hermelinda [address] [place] [address] Joe Perdomo  Ph: [phone]. [email or web address]  Fax: [phone]

## 2023-03-20 ENCOUNTER — HOSPITAL ENCOUNTER (OUTPATIENT)
Dept: CT IMAGING | Age: 53
Discharge: HOME | End: 2023-03-20
Attending: INTERNAL MEDICINE
Payer: COMMERCIAL

## 2023-03-20 DIAGNOSIS — C15.5 MALIGNANT NEOPLASM OF LOWER THIRD OF ESOPHAGUS (HCC): ICD-10-CM

## 2023-03-20 PROCEDURE — 71260 CT THORAX DX C+: CPT | Performed by: INTERNAL MEDICINE

## 2023-03-20 PROCEDURE — 74177 CT ABD & PELVIS W/CONTRAST: CPT | Performed by: INTERNAL MEDICINE

## 2023-03-24 ENCOUNTER — OFFICE VISIT (OUTPATIENT)
Dept: FAMILY MEDICINE CLINIC | Facility: CLINIC | Age: 53
End: 2023-03-24
Payer: COMMERCIAL

## 2023-03-24 VITALS
DIASTOLIC BLOOD PRESSURE: 78 MMHG | BODY MASS INDEX: 31.44 KG/M2 | HEIGHT: 74 IN | WEIGHT: 245 LBS | HEART RATE: 65 BPM | OXYGEN SATURATION: 96 % | RESPIRATION RATE: 18 BRPM | TEMPERATURE: 98 F | SYSTOLIC BLOOD PRESSURE: 130 MMHG

## 2023-03-24 DIAGNOSIS — R05.1 ACUTE COUGH: Primary | ICD-10-CM

## 2023-03-24 DIAGNOSIS — Z20.822 SUSPECTED COVID-19 VIRUS INFECTION: ICD-10-CM

## 2023-03-24 PROCEDURE — 3078F DIAST BP <80 MM HG: CPT | Performed by: NURSE PRACTITIONER

## 2023-03-24 PROCEDURE — 3008F BODY MASS INDEX DOCD: CPT | Performed by: NURSE PRACTITIONER

## 2023-03-24 PROCEDURE — 99213 OFFICE O/P EST LOW 20 MIN: CPT | Performed by: NURSE PRACTITIONER

## 2023-03-24 PROCEDURE — 3075F SYST BP GE 130 - 139MM HG: CPT | Performed by: NURSE PRACTITIONER

## 2023-03-24 RX ORDER — AMOXICILLIN 875 MG/1
875 TABLET, COATED ORAL 2 TIMES DAILY
Qty: 20 TABLET | Refills: 0 | Status: SHIPPED | OUTPATIENT
Start: 2023-03-24 | End: 2023-04-03

## 2023-03-24 RX ORDER — BENZONATATE 200 MG/1
200 CAPSULE ORAL 3 TIMES DAILY PRN
Qty: 20 CAPSULE | Refills: 0 | Status: SHIPPED | OUTPATIENT
Start: 2023-03-24 | End: 2023-03-31

## 2023-03-25 LAB — SARS-COV-2 RNA RESP QL NAA+PROBE: NOT DETECTED

## 2023-03-27 RX ORDER — METOPROLOL SUCCINATE 50 MG/1
TABLET, EXTENDED RELEASE ORAL
Qty: 90 TABLET | Refills: 0 | Status: SHIPPED | OUTPATIENT
Start: 2023-03-27

## 2023-03-27 RX ORDER — ATORVASTATIN CALCIUM 20 MG/1
TABLET, FILM COATED ORAL
Qty: 90 TABLET | Refills: 0 | Status: SHIPPED | OUTPATIENT
Start: 2023-03-27

## 2023-03-28 ENCOUNTER — TELEPHONE (OUTPATIENT)
Dept: HEMATOLOGY/ONCOLOGY | Facility: HOSPITAL | Age: 53
End: 2023-03-28

## 2023-03-28 NOTE — TELEPHONE ENCOUNTER
Michela Flood calling asking about his lab work done earlier this month he wants to know what tests are done are done to check for cancer.  Lyndsey Gaffney

## 2023-03-28 NOTE — TELEPHONE ENCOUNTER
Patient calling to check on the status of [employer] testing. Confirmed in [employer] portal that the test is in process. First lab draw was done 3/7, tissue specimen was received 3/18. Discussed with patient that first time testing can take upwards of 4 weeks from when they receive the specimens.

## 2023-04-03 ENCOUNTER — OFFICE VISIT (OUTPATIENT)
Dept: HEMATOLOGY/ONCOLOGY | Age: 53
End: 2023-04-03
Attending: INTERNAL MEDICINE
Payer: COMMERCIAL

## 2023-04-03 VITALS
SYSTOLIC BLOOD PRESSURE: 166 MMHG | WEIGHT: 251 LBS | HEART RATE: 56 BPM | BODY MASS INDEX: 32.21 KG/M2 | OXYGEN SATURATION: 99 % | HEIGHT: 74.02 IN | RESPIRATION RATE: 18 BRPM | DIASTOLIC BLOOD PRESSURE: 97 MMHG | TEMPERATURE: 98 F

## 2023-04-03 DIAGNOSIS — Z51.12 ENCOUNTER FOR ANTINEOPLASTIC IMMUNOTHERAPY: ICD-10-CM

## 2023-04-03 DIAGNOSIS — C15.5 MALIGNANT NEOPLASM OF LOWER THIRD OF ESOPHAGUS (HCC): Primary | ICD-10-CM

## 2023-04-03 LAB
ALBUMIN SERPL-MCNC: 3.5 G/DL (ref 3.4–5)
ALBUMIN/GLOB SERPL: 1 {RATIO} (ref 1–2)
ALP LIVER SERPL-CCNC: 104 U/L
ALT SERPL-CCNC: 30 U/L
ANION GAP SERPL CALC-SCNC: 6 MMOL/L (ref 0–18)
AST SERPL-CCNC: 23 U/L (ref 15–37)
BASOPHILS # BLD AUTO: 0.03 X10(3) UL (ref 0–0.2)
BASOPHILS NFR BLD AUTO: 0.8 %
BILIRUB SERPL-MCNC: 0.4 MG/DL (ref 0.1–2)
BUN BLD-MCNC: 14 MG/DL (ref 7–18)
CALCIUM BLD-MCNC: 8.9 MG/DL (ref 8.5–10.1)
CANCER AG19-9 SERPL-ACNC: 1514.1 U/ML (ref ?–37)
CHLORIDE SERPL-SCNC: 106 MMOL/L (ref 98–112)
CO2 SERPL-SCNC: 25 MMOL/L (ref 21–32)
CREAT BLD-MCNC: 0.84 MG/DL
EOSINOPHIL # BLD AUTO: 0.16 X10(3) UL (ref 0–0.7)
EOSINOPHIL NFR BLD AUTO: 4.1 %
ERYTHROCYTE [DISTWIDTH] IN BLOOD BY AUTOMATED COUNT: 13.8 %
GFR SERPLBLD BASED ON 1.73 SQ M-ARVRAT: 104 ML/MIN/1.73M2 (ref 60–?)
GLOBULIN PLAS-MCNC: 3.4 G/DL (ref 2.8–4.4)
GLUCOSE BLD-MCNC: 129 MG/DL (ref 70–99)
HCT VFR BLD AUTO: 36.4 %
HGB BLD-MCNC: 11.8 G/DL
IMM GRANULOCYTES # BLD AUTO: 0.03 X10(3) UL (ref 0–1)
IMM GRANULOCYTES NFR BLD: 0.8 %
LYMPHOCYTES # BLD AUTO: 0.63 X10(3) UL (ref 1–4)
LYMPHOCYTES NFR BLD AUTO: 16 %
MCH RBC QN AUTO: 30.3 PG (ref 26–34)
MCHC RBC AUTO-ENTMCNC: 32.4 G/DL (ref 31–37)
MCV RBC AUTO: 93.3 FL
MONOCYTES # BLD AUTO: 0.41 X10(3) UL (ref 0.1–1)
MONOCYTES NFR BLD AUTO: 10.4 %
NEUTROPHILS # BLD AUTO: 2.67 X10 (3) UL (ref 1.5–7.7)
NEUTROPHILS # BLD AUTO: 2.67 X10(3) UL (ref 1.5–7.7)
NEUTROPHILS NFR BLD AUTO: 67.9 %
OSMOLALITY SERPL CALC.SUM OF ELEC: 286 MOSM/KG (ref 275–295)
PLATELET # BLD AUTO: 182 10(3)UL (ref 150–450)
POTASSIUM SERPL-SCNC: 4.1 MMOL/L (ref 3.5–5.1)
PROT SERPL-MCNC: 6.9 G/DL (ref 6.4–8.2)
RBC # BLD AUTO: 3.9 X10(6)UL
SODIUM SERPL-SCNC: 137 MMOL/L (ref 136–145)
T4 FREE SERPL-MCNC: 0.9 NG/DL (ref 0.8–1.7)
TSI SER-ACNC: 0.49 MIU/ML (ref 0.36–3.74)
WBC # BLD AUTO: 3.9 X10(3) UL (ref 4–11)

## 2023-04-03 PROCEDURE — 96413 CHEMO IV INFUSION 1 HR: CPT

## 2023-04-03 PROCEDURE — 80053 COMPREHEN METABOLIC PANEL: CPT

## 2023-04-03 PROCEDURE — 85025 COMPLETE CBC W/AUTO DIFF WBC: CPT

## 2023-04-03 PROCEDURE — 84439 ASSAY OF FREE THYROXINE: CPT

## 2023-04-03 PROCEDURE — 86301 IMMUNOASSAY TUMOR CA 19-9: CPT

## 2023-04-03 PROCEDURE — 84443 ASSAY THYROID STIM HORMONE: CPT

## 2023-04-03 RX ORDER — SERTRALINE HYDROCHLORIDE 100 MG/1
100 TABLET, FILM COATED ORAL DAILY
COMMUNITY

## 2023-04-03 NOTE — PROGRESS NOTES
Patient is here today for follow up with Elijah Busch  for Malignant neoplasm lower third of esophagus. C6D1 Opdivo therapy. Patient denies pain. Denies nausea - appetite is good. Patient stated diarrhea day 2 post treatment only resolved on its own. No cough or shortness of breath. Medication list,medical history and toxicities were reviewed and updated. Education Record    Learner:  Patient      Disease / Diagnosis: Malignant neoplasm lower thirdof esophagus    Barriers / Limitations:  None   Comments:    Method:  Brief focused, Discussion, Printed material and Reinforcement   Comments:    General Topics:  Medication, Pain, Procedure and Plan of care reviewed   Comments:    Outcome:  Shows understanding   Comments:  Post APN visit. Patient sent to waiting room. Treating RN notified. AVS provided and follow up reviewed. Patient instructed to call as needed.

## 2023-04-03 NOTE — PROGRESS NOTES
Pt here for C6D1. Arrives Ambulating independently, accompanied by Self           Pregnancy screening: Not applicable    Modifications in dose or schedule: No    Drugs/infusions dual verified for appearance and physical integrity. IV pump settings were dual verified: yes     Frequency of blood return and site check throughout administration: Prior to administration, Prior to each drug and At completion of therapy   Discharged to Home, Ambulating independently, accompanied by:Self    Outpatient Oncology Care Plan  Problem list:  fatigue  Problems related to:  chemotherapy  Interventions:  encourage activity as tolerated  Expected outcomes:  symptoms relieved/minimized  understands plan of care  Progress towards outcome:  making progress    Education Record    Learner:  Patient  Barriers / Limitations:  None  Method:  Reinforcement  Outcome:  Shows understanding  Comments:  Patient tolerated treatment, no c/o. Discharged home in stable condition.

## 2023-04-10 ENCOUNTER — HOSPITAL ENCOUNTER (OUTPATIENT)
Dept: NUCLEAR MEDICINE | Facility: HOSPITAL | Age: 53
Discharge: HOME | End: 2023-04-10
Attending: INTERNAL MEDICINE
Payer: COMMERCIAL

## 2023-04-10 ENCOUNTER — OFFICE VISIT (OUTPATIENT)
Dept: HEMATOLOGY/ONCOLOGY | Facility: HOSPITAL | Age: 53
End: 2023-04-10
Attending: INTERNAL MEDICINE
Payer: COMMERCIAL

## 2023-04-10 VITALS
TEMPERATURE: 98 F | WEIGHT: 246.13 LBS | HEART RATE: 50 BPM | DIASTOLIC BLOOD PRESSURE: 90 MMHG | RESPIRATION RATE: 18 BRPM | BODY MASS INDEX: 31.59 KG/M2 | HEIGHT: 74.02 IN | SYSTOLIC BLOOD PRESSURE: 160 MMHG | OXYGEN SATURATION: 98 %

## 2023-04-10 DIAGNOSIS — F33.1 MAJOR DEPRESSIVE DISORDER, RECURRENT, MODERATE (HCC): ICD-10-CM

## 2023-04-10 DIAGNOSIS — R21 RASH: Primary | ICD-10-CM

## 2023-04-10 DIAGNOSIS — C15.9 MALIGNANT NEOPLASM OF ESOPHAGUS, UNSPECIFIED LOCATION (HCC): ICD-10-CM

## 2023-04-10 DIAGNOSIS — C15.5 MALIGNANT NEOPLASM OF LOWER THIRD OF ESOPHAGUS (HCC): ICD-10-CM

## 2023-04-10 LAB — GLUCOSE BLD-MCNC: 95 MG/DL (ref 70–99)

## 2023-04-10 PROCEDURE — 82962 GLUCOSE BLOOD TEST: CPT

## 2023-04-10 PROCEDURE — 78815 PET IMAGE W/CT SKULL-THIGH: CPT | Performed by: INTERNAL MEDICINE

## 2023-04-10 PROCEDURE — 99214 OFFICE O/P EST MOD 30 MIN: CPT | Performed by: NURSE PRACTITIONER

## 2023-04-10 RX ORDER — TRIAMCINOLONE ACETONIDE 1 MG/G
CREAM TOPICAL 2 TIMES DAILY
Qty: 453.6 G | Refills: 0 | Status: SHIPPED | OUTPATIENT
Start: 2023-04-10

## 2023-04-10 NOTE — PROGRESS NOTES
Patient presents with:  Rash: APN assessment for rash    Pt is here today for a rash on his Rt forearm for the last six days. Pt c/o mild itchy and mild tenderness. He states that the rash is getting worse. He has be putting hydrocortisone cream on the rash. Pt denies bleeding, discharge and fevers.     Education Record    Learner:  Patient    Disease / Diagnosis: malignant neoplasm of lower third of esophagus     Barriers / Limitations:  None   Comments:    Method:  Brief focused   Comments:    General Topics:  Plan of care reviewed   Comments:    Outcome:  Shows understanding   Comments:

## 2023-04-11 RX ORDER — SERTRALINE HYDROCHLORIDE 100 MG/1
TABLET, FILM COATED ORAL
Qty: 90 TABLET | Refills: 1 | Status: SHIPPED | OUTPATIENT
Start: 2023-04-11

## 2023-04-12 ENCOUNTER — SOCIAL WORK SERVICES (OUTPATIENT)
Dept: HEMATOLOGY/ONCOLOGY | Facility: HOSPITAL | Age: 53
End: 2023-04-12

## 2023-04-12 NOTE — PROGRESS NOTES
SW assisted with paperwork from [employer]. Completed paperwork faxed to [facility] fax [phone]. SHARON MobleyW   at [address], [address] [place] [address], [address] [place]  Ph: [phone]. [email or web address]  Fax: [phone]

## 2023-04-25 ENCOUNTER — ANESTHESIA (OUTPATIENT)
Dept: ENDOSCOPY | Facility: HOSPITAL | Age: 53
End: 2023-04-25
Payer: COMMERCIAL

## 2023-04-25 ENCOUNTER — ANESTHESIA EVENT (OUTPATIENT)
Dept: ENDOSCOPY | Facility: HOSPITAL | Age: 53
End: 2023-04-25
Payer: COMMERCIAL

## 2023-04-25 ENCOUNTER — HOSPITAL ENCOUNTER (OUTPATIENT)
Facility: HOSPITAL | Age: 53
Setting detail: HOSPITAL OUTPATIENT SURGERY
Discharge: HOME | End: 2023-04-25
Attending: INTERNAL MEDICINE | Admitting: INTERNAL MEDICINE
Payer: COMMERCIAL

## 2023-04-25 VITALS
BODY MASS INDEX: 31.83 KG/M2 | TEMPERATURE: 97 F | HEART RATE: 54 BPM | DIASTOLIC BLOOD PRESSURE: 65 MMHG | OXYGEN SATURATION: 98 % | RESPIRATION RATE: 16 BRPM | SYSTOLIC BLOOD PRESSURE: 139 MMHG | WEIGHT: 248 LBS | HEIGHT: 74 IN

## 2023-04-25 DIAGNOSIS — R94.8 ABNORMAL GASTROINTESTINAL PET SCAN: ICD-10-CM

## 2023-04-25 PROCEDURE — 88305 TISSUE EXAM BY PATHOLOGIST: CPT | Performed by: INTERNAL MEDICINE

## 2023-04-25 PROCEDURE — 0DB38ZX EXCISION OF LOWER ESOPHAGUS, VIA NATURAL OR ARTIFICIAL OPENING ENDOSCOPIC, DIAGNOSTIC: ICD-10-PCS | Performed by: INTERNAL MEDICINE

## 2023-04-25 PROCEDURE — 88342 IMHCHEM/IMCYTCHM 1ST ANTB: CPT | Performed by: INTERNAL MEDICINE

## 2023-04-25 PROCEDURE — 88307 TISSUE EXAM BY PATHOLOGIST: CPT | Performed by: INTERNAL MEDICINE

## 2023-04-25 PROCEDURE — 88173 CYTOPATH EVAL FNA REPORT: CPT | Performed by: INTERNAL MEDICINE

## 2023-04-25 PROCEDURE — 88341 IMHCHEM/IMCYTCHM EA ADD ANTB: CPT | Performed by: INTERNAL MEDICINE

## 2023-04-25 PROCEDURE — 88172 CYTP DX EVAL FNA 1ST EA SITE: CPT | Performed by: INTERNAL MEDICINE

## 2023-04-25 PROCEDURE — 0FBG8ZX EXCISION OF PANCREAS, VIA NATURAL OR ARTIFICIAL OPENING ENDOSCOPIC, DIAGNOSTIC: ICD-10-PCS | Performed by: INTERNAL MEDICINE

## 2023-04-25 RX ORDER — SODIUM CHLORIDE, SODIUM LACTATE, POTASSIUM CHLORIDE, CALCIUM CHLORIDE 600; 310; 30; 20 MG/100ML; MG/100ML; MG/100ML; MG/100ML
INJECTION, SOLUTION INTRAVENOUS CONTINUOUS
Status: DISCONTINUED | OUTPATIENT
Start: 2023-04-25 | End: 2023-04-25

## 2023-04-25 RX ORDER — NALOXONE HYDROCHLORIDE 0.4 MG/ML
80 INJECTION, SOLUTION INTRAMUSCULAR; INTRAVENOUS; SUBCUTANEOUS AS NEEDED
Status: DISCONTINUED | OUTPATIENT
Start: 2023-04-25 | End: 2023-04-25

## 2023-04-25 RX ORDER — LIDOCAINE HYDROCHLORIDE 10 MG/ML
INJECTION, SOLUTION EPIDURAL; INFILTRATION; INTRACAUDAL; PERINEURAL AS NEEDED
Status: DISCONTINUED | OUTPATIENT
Start: 2023-04-25 | End: 2023-04-25 | Stop reason: SURG

## 2023-04-25 RX ADMIN — LIDOCAINE HYDROCHLORIDE 25 MG: 10 INJECTION, SOLUTION EPIDURAL; INFILTRATION; INTRACAUDAL; PERINEURAL at 14:09:00

## 2023-04-25 RX ADMIN — SODIUM CHLORIDE, SODIUM LACTATE, POTASSIUM CHLORIDE, CALCIUM CHLORIDE: 600; 310; 30; 20 INJECTION, SOLUTION INTRAVENOUS at 14:46:00

## 2023-04-25 RX ADMIN — SODIUM CHLORIDE, SODIUM LACTATE, POTASSIUM CHLORIDE, CALCIUM CHLORIDE: 600; 310; 30; 20 INJECTION, SOLUTION INTRAVENOUS at 14:05:00

## 2023-04-25 NOTE — ANESTHESIA POSTPROCEDURE EVALUATION
[address] Patient Status:  Hospital Outpatient Surgery   Age/Gender 47year old male MRN [id number]   Location [address] [address] Attending Jesus Jamison MD   Hosp Day # 0 PCP Marlyn Pratt MD       Anesthesia Post-op Note    ESOPHAGOGASTRODUODENOSCOPY (EGD) with biopsies and endoscopic ultrasound with fine needle biopsy    Procedure Summary     Date: 04/25/23 Room / Location: [facility] ENDOSCOPY 02 / 1404 [facility] ENDOSCOPY    Anesthesia Start: 5666 Anesthesia Stop: 5770    Procedures:       ESOPHAGOGASTRODUODENOSCOPY (EGD) with biopsies and endoscopic ultrasound with fine needle biopsy      ENDOSCOPIC ULTRASOUND (EUS) Diagnosis:       Abnormal gastrointestinal PET scan      (EGD:  surgical changes, anastomatic ulcer EUS: pancreas head mass)    Surgeons: Jesus Jamison MD Anesthesiologist: Coley Kussmaul, MD    Anesthesia Type: MAC ASA Status: 3          Anesthesia Type: MAC    Vitals Value Taken Time   /65 04/25/23 1447   Temp  04/25/23 1449   Pulse 58 04/25/23 1449   Resp 16 04/25/23 1449   SpO2 95 % 04/25/23 1449   Vitals shown include unvalidated device data. Patient Location: Endoscopy    Anesthesia Type: MAC    Airway Patency: patent    Postop Pain Control: adequate    Mental Status: preanesthetic baseline    Nausea/Vomiting: none    Cardiopulmonary/Hydration status: stable euvolemic    Complications: no apparent anesthesia related complications    Postop vital signs: stable    Dental Exam: Unchanged from Preop    Patient to be discharged home when criteria met.

## 2023-04-27 ENCOUNTER — TELEPHONE (OUTPATIENT)
Dept: HEMATOLOGY/ONCOLOGY | Facility: HOSPITAL | Age: 53
End: 2023-04-27

## 2023-04-27 DIAGNOSIS — C15.5 MALIGNANT NEOPLASM OF LOWER THIRD OF ESOPHAGUS (HCC): Primary | ICD-10-CM

## 2023-05-01 ENCOUNTER — OFFICE VISIT (OUTPATIENT)
Dept: HEMATOLOGY/ONCOLOGY | Age: 53
End: 2023-05-01
Attending: INTERNAL MEDICINE
Payer: COMMERCIAL

## 2023-05-01 ENCOUNTER — APPOINTMENT (OUTPATIENT)
Dept: HEMATOLOGY/ONCOLOGY | Age: 53
End: 2023-05-01
Attending: INTERNAL MEDICINE
Payer: COMMERCIAL

## 2023-05-01 ENCOUNTER — HOSPITAL ENCOUNTER (OUTPATIENT)
Dept: CV DIAGNOSTICS | Facility: HOSPITAL | Age: 53
Discharge: HOME | End: 2023-05-01
Attending: INTERNAL MEDICINE
Payer: COMMERCIAL

## 2023-05-01 VITALS
HEIGHT: 73.82 IN | OXYGEN SATURATION: 95 % | HEART RATE: 53 BPM | SYSTOLIC BLOOD PRESSURE: 157 MMHG | BODY MASS INDEX: 31.32 KG/M2 | RESPIRATION RATE: 18 BRPM | TEMPERATURE: 97 F | WEIGHT: 244 LBS | DIASTOLIC BLOOD PRESSURE: 80 MMHG

## 2023-05-01 DIAGNOSIS — Z51.12 ENCOUNTER FOR ANTINEOPLASTIC IMMUNOTHERAPY: ICD-10-CM

## 2023-05-01 DIAGNOSIS — C15.5 MALIGNANT NEOPLASM OF LOWER THIRD OF ESOPHAGUS (HCC): Primary | ICD-10-CM

## 2023-05-01 DIAGNOSIS — C15.5 MALIGNANT NEOPLASM OF LOWER THIRD OF ESOPHAGUS (HCC): ICD-10-CM

## 2023-05-01 DIAGNOSIS — Z71.9 ENCOUNTER FOR HEALTH EDUCATION: ICD-10-CM

## 2023-05-01 DIAGNOSIS — Z51.11 ENCOUNTER FOR CHEMOTHERAPY MANAGEMENT: ICD-10-CM

## 2023-05-01 LAB
ALBUMIN SERPL-MCNC: 3.4 G/DL (ref 3.4–5)
ALBUMIN/GLOB SERPL: 0.9 {RATIO} (ref 1–2)
ALP LIVER SERPL-CCNC: 102 U/L
ALT SERPL-CCNC: 30 U/L
ANION GAP SERPL CALC-SCNC: 4 MMOL/L (ref 0–18)
AST SERPL-CCNC: 19 U/L (ref 15–37)
BASOPHILS # BLD AUTO: 0.03 X10(3) UL (ref 0–0.2)
BASOPHILS NFR BLD AUTO: 0.8 %
BILIRUB SERPL-MCNC: 0.4 MG/DL (ref 0.1–2)
BUN BLD-MCNC: 14 MG/DL (ref 7–18)
CALCIUM BLD-MCNC: 9.1 MG/DL (ref 8.5–10.1)
CHLORIDE SERPL-SCNC: 109 MMOL/L (ref 98–112)
CO2 SERPL-SCNC: 26 MMOL/L (ref 21–32)
CREAT BLD-MCNC: 0.74 MG/DL
EOSINOPHIL # BLD AUTO: 0.1 X10(3) UL (ref 0–0.7)
EOSINOPHIL NFR BLD AUTO: 2.8 %
ERYTHROCYTE [DISTWIDTH] IN BLOOD BY AUTOMATED COUNT: 12.7 %
GFR SERPLBLD BASED ON 1.73 SQ M-ARVRAT: 108 ML/MIN/1.73M2 (ref 60–?)
GLOBULIN PLAS-MCNC: 3.7 G/DL (ref 2.8–4.4)
GLUCOSE BLD-MCNC: 112 MG/DL (ref 70–99)
HCT VFR BLD AUTO: 37.4 %
HGB BLD-MCNC: 11.9 G/DL
IMM GRANULOCYTES # BLD AUTO: 0.01 X10(3) UL (ref 0–1)
IMM GRANULOCYTES NFR BLD: 0.3 %
LYMPHOCYTES # BLD AUTO: 0.63 X10(3) UL (ref 1–4)
LYMPHOCYTES NFR BLD AUTO: 17.5 %
MCH RBC QN AUTO: 30.1 PG (ref 26–34)
MCHC RBC AUTO-ENTMCNC: 31.8 G/DL (ref 31–37)
MCV RBC AUTO: 94.7 FL
MONOCYTES # BLD AUTO: 0.36 X10(3) UL (ref 0.1–1)
MONOCYTES NFR BLD AUTO: 10 %
NEUTROPHILS # BLD AUTO: 2.48 X10 (3) UL (ref 1.5–7.7)
NEUTROPHILS # BLD AUTO: 2.48 X10(3) UL (ref 1.5–7.7)
NEUTROPHILS NFR BLD AUTO: 68.6 %
OSMOLALITY SERPL CALC.SUM OF ELEC: 289 MOSM/KG (ref 275–295)
PLATELET # BLD AUTO: 171 10(3)UL (ref 150–450)
POTASSIUM SERPL-SCNC: 4 MMOL/L (ref 3.5–5.1)
PROT SERPL-MCNC: 7.1 G/DL (ref 6.4–8.2)
RBC # BLD AUTO: 3.95 X10(6)UL
SODIUM SERPL-SCNC: 139 MMOL/L (ref 136–145)
WBC # BLD AUTO: 3.6 X10(3) UL (ref 4–11)

## 2023-05-01 PROCEDURE — 96368 THER/DIAG CONCURRENT INF: CPT

## 2023-05-01 PROCEDURE — 96413 CHEMO IV INFUSION 1 HR: CPT

## 2023-05-01 PROCEDURE — 96415 CHEMO IV INFUSION ADDL HR: CPT

## 2023-05-01 PROCEDURE — 99417 PROLNG OP E/M EACH 15 MIN: CPT | Performed by: NURSE PRACTITIONER

## 2023-05-01 PROCEDURE — 99215 OFFICE O/P EST HI 40 MIN: CPT | Performed by: NURSE PRACTITIONER

## 2023-05-01 PROCEDURE — 96411 CHEMO IV PUSH ADDL DRUG: CPT

## 2023-05-01 PROCEDURE — 93306 TTE W/DOPPLER COMPLETE: CPT | Performed by: INTERNAL MEDICINE

## 2023-05-01 PROCEDURE — 85025 COMPLETE CBC W/AUTO DIFF WBC: CPT

## 2023-05-01 PROCEDURE — 96375 TX/PRO/DX INJ NEW DRUG ADDON: CPT

## 2023-05-01 PROCEDURE — 80053 COMPREHEN METABOLIC PANEL: CPT

## 2023-05-01 PROCEDURE — 96417 CHEMO IV INFUS EACH ADDL SEQ: CPT

## 2023-05-01 RX ORDER — FLUOROURACIL 50 MG/ML
400 INJECTION, SOLUTION INTRAVENOUS ONCE
Status: COMPLETED | OUTPATIENT
Start: 2023-05-01 | End: 2023-05-01

## 2023-05-01 RX ORDER — FLUOROURACIL 50 MG/ML
400 INJECTION, SOLUTION INTRAVENOUS ONCE
Status: CANCELLED | OUTPATIENT
Start: 2024-02-05

## 2023-05-01 RX ORDER — ACETAMINOPHEN 325 MG/1
650 TABLET ORAL ONCE
Status: COMPLETED | OUTPATIENT
Start: 2023-05-01 | End: 2023-05-01

## 2023-05-01 RX ORDER — ONDANSETRON HYDROCHLORIDE 8 MG/1
8 TABLET, FILM COATED ORAL EVERY 8 HOURS PRN
Qty: 30 TABLET | Refills: 3 | Status: SHIPPED | OUTPATIENT
Start: 2023-05-01

## 2023-05-01 RX ORDER — ACETAMINOPHEN 325 MG/1
650 TABLET ORAL ONCE
Status: CANCELLED | OUTPATIENT
Start: 2024-02-05

## 2023-05-01 RX ORDER — FLUOROURACIL 50 MG/ML
2400 INJECTION, SOLUTION INTRAVENOUS CONTINUOUS
Status: CANCELLED | OUTPATIENT
Start: 2024-02-05

## 2023-05-01 RX ORDER — DIPHENHYDRAMINE HCL 25 MG
25 CAPSULE ORAL ONCE
Status: COMPLETED | OUTPATIENT
Start: 2023-05-01 | End: 2023-05-01

## 2023-05-01 RX ORDER — FLUOROURACIL 50 MG/ML
2400 INJECTION, SOLUTION INTRAVENOUS CONTINUOUS
Status: DISCONTINUED | OUTPATIENT
Start: 2023-05-01 | End: 2023-05-01

## 2023-05-01 RX ORDER — PROCHLORPERAZINE MALEATE 10 MG
10 TABLET ORAL EVERY 6 HOURS PRN
Qty: 30 TABLET | Refills: 3 | Status: SHIPPED | OUTPATIENT
Start: 2023-05-01

## 2023-05-01 RX ORDER — DIPHENHYDRAMINE HCL 25 MG
25 CAPSULE ORAL ONCE
Status: CANCELLED | OUTPATIENT
Start: 2024-02-05

## 2023-05-01 RX ADMIN — ACETAMINOPHEN 650 MG: 325 TABLET ORAL at 10:42:00

## 2023-05-01 RX ADMIN — FLUOROURACIL 5650 MG: 50 INJECTION, SOLUTION INTRAVENOUS at 15:35:00

## 2023-05-01 RX ADMIN — DIPHENHYDRAMINE HCL 25 MG: 25 MG CAPSULE ORAL at 10:42:00

## 2023-05-01 RX ADMIN — FLUOROURACIL 950 MG: 50 INJECTION, SOLUTION INTRAVENOUS at 15:29:00

## 2023-05-01 NOTE — PATIENT INSTRUCTIONS
[place] SCHEDULE:    1. You may take compazine (prochlorperazine) today around 5pm or anytime. 2. You may take ondansetron (zofran) tonight at 9 PM or prior to bedtime. 3. Alternate between zofran and compazinie every 4 hours for the next 2-3 days. For example: zofran at 6am, compazine 10am, zofran 2pm, compazine at 6pm, etc.  4. Then you may take antinausea medication as needed. You do NOT need to wake yourself up in the middle of the night to take anti-nausea medications. Zofran side effects: headaches and constipation.

## 2023-05-01 NOTE — PROGRESS NOTES
Education Record    Learner:  Patient    Disease / Diagnosis:rec cancer  Starting new therapy. Barriers / Limitations:  None   Comments:    Method:  Discussion   Comments:    General Topics:  Plan of care reviewed   Comments:    Outcome:  Shows understanding   Comments:  Chemo ed today, consent signed.

## 2023-05-01 NOTE — PROGRESS NOTES
Pt here for C1D1 . Arrives Ambulating independently, accompanied by Self and Spouse       Oral medications included in this regimen:  no    Patient confirms comprehension of cancer treatment schedule:  yes    Pregnancy screening:  Not applicable    Modifications in dose or schedule:  No    Medications appearance and physical integrity checked by RN. Chemotherapy IV pump settings verified by 2 RNs:  yes     Frequency of blood return and site check throughout administration: Prior to administration, Prior to each drug, Every 2-3 ml IVP and At completion of therapy     Infusion/treatment outcome:  patient tolerated treatment without incident    Education Record    Learner:  Patient and Spouse  Barriers / Limitations:  None  Method:  Discussion  Education / instructions given:  Yes, CADD pump, chemo spill kit, self disconnect, anti-nausea schedule  Outcome:  Shows understanding    Discharged Home, Ambulating independently, accompanied by:Self and Spouse    Patient/family verbalized understanding of future appointments: by printed AVS     Patient states he used his port for TPN and self disconnected before. Comfortable with self disconnecting his CADD pump. Able to explain steps to RN. Kit given to patient and instructed to put everything in the yellow chemo bag and return with with next appt.

## 2023-05-02 ENCOUNTER — TELEPHONE (OUTPATIENT)
Dept: HEMATOLOGY/ONCOLOGY | Facility: HOSPITAL | Age: 53
End: 2023-05-02

## 2023-05-02 DIAGNOSIS — C15.9 MALIGNANT NEOPLASM OF ESOPHAGUS, UNSPECIFIED LOCATION (HCC): Primary | ICD-10-CM

## 2023-05-02 NOTE — TELEPHONE ENCOUNTER
Toxicities: C1 D1 Fluorouracil/Leucovorin/Oxaliplatin/Trastuzumab-qyyp/Nivolumab on 5/1/2023    Dearl Pollomartir is feeling \"pretty good. \" No side effects at this time. I encouraged him to please call the office if he is not feeling well or has any questions or concerns. He agreed and thanked me for checking on him. He did say he will need a new referral for PT. He sent a Riffyn message yesterday.

## 2023-05-08 ENCOUNTER — APPOINTMENT (OUTPATIENT)
Dept: HEMATOLOGY/ONCOLOGY | Age: 53
End: 2023-05-08
Attending: INTERNAL MEDICINE
Payer: COMMERCIAL

## 2023-05-08 ENCOUNTER — TELEPHONE (OUTPATIENT)
Dept: HEMATOLOGY/ONCOLOGY | Age: 53
End: 2023-05-08

## 2023-05-15 ENCOUNTER — APPOINTMENT (OUTPATIENT)
Dept: HEMATOLOGY/ONCOLOGY | Age: 53
End: 2023-05-15
Attending: INTERNAL MEDICINE
Payer: COMMERCIAL

## 2023-05-17 ENCOUNTER — OFFICE VISIT (OUTPATIENT)
Dept: HEMATOLOGY/ONCOLOGY | Facility: HOSPITAL | Age: 53
End: 2023-05-17
Attending: INTERNAL MEDICINE
Payer: COMMERCIAL

## 2023-05-17 ENCOUNTER — NURSE NAVIGATOR ENCOUNTER (OUTPATIENT)
Dept: HEMATOLOGY/ONCOLOGY | Facility: HOSPITAL | Age: 53
End: 2023-05-17

## 2023-05-17 VITALS
RESPIRATION RATE: 16 BRPM | HEART RATE: 51 BPM | DIASTOLIC BLOOD PRESSURE: 81 MMHG | TEMPERATURE: 97 F | SYSTOLIC BLOOD PRESSURE: 171 MMHG | WEIGHT: 240.38 LBS | BODY MASS INDEX: 31 KG/M2 | OXYGEN SATURATION: 98 %

## 2023-05-17 DIAGNOSIS — C15.5 MALIGNANT NEOPLASM OF LOWER THIRD OF ESOPHAGUS (HCC): Primary | ICD-10-CM

## 2023-05-17 LAB
ALBUMIN SERPL-MCNC: 3.5 G/DL (ref 3.4–5)
ALBUMIN/GLOB SERPL: 1 {RATIO} (ref 1–2)
ALP LIVER SERPL-CCNC: 112 U/L
ALT SERPL-CCNC: 25 U/L
ANION GAP SERPL CALC-SCNC: 4 MMOL/L (ref 0–18)
AST SERPL-CCNC: 22 U/L (ref 15–37)
BASOPHILS # BLD AUTO: 0.01 X10(3) UL (ref 0–0.2)
BASOPHILS NFR BLD AUTO: 0.4 %
BILIRUB SERPL-MCNC: 0.5 MG/DL (ref 0.1–2)
BUN BLD-MCNC: 11 MG/DL (ref 7–18)
CALCIUM BLD-MCNC: 8.9 MG/DL (ref 8.5–10.1)
CANCER AG19-9 SERPL-ACNC: 1454.8 U/ML (ref ?–37)
CHLORIDE SERPL-SCNC: 109 MMOL/L (ref 98–112)
CO2 SERPL-SCNC: 27 MMOL/L (ref 21–32)
CREAT BLD-MCNC: 0.75 MG/DL
EOSINOPHIL # BLD AUTO: 0.07 X10(3) UL (ref 0–0.7)
EOSINOPHIL NFR BLD AUTO: 2.7 %
ERYTHROCYTE [DISTWIDTH] IN BLOOD BY AUTOMATED COUNT: 13.2 %
GFR SERPLBLD BASED ON 1.73 SQ M-ARVRAT: 108 ML/MIN/1.73M2 (ref 60–?)
GLOBULIN PLAS-MCNC: 3.4 G/DL (ref 2.8–4.4)
GLUCOSE BLD-MCNC: 102 MG/DL (ref 70–99)
HCT VFR BLD AUTO: 35.7 %
HGB BLD-MCNC: 11.3 G/DL
IMM GRANULOCYTES # BLD AUTO: 0 X10(3) UL (ref 0–1)
IMM GRANULOCYTES NFR BLD: 0 %
LYMPHOCYTES # BLD AUTO: 0.52 X10(3) UL (ref 1–4)
LYMPHOCYTES NFR BLD AUTO: 19.9 %
MCH RBC QN AUTO: 29.7 PG (ref 26–34)
MCHC RBC AUTO-ENTMCNC: 31.7 G/DL (ref 31–37)
MCV RBC AUTO: 93.9 FL
MONOCYTES # BLD AUTO: 0.4 X10(3) UL (ref 0.1–1)
MONOCYTES NFR BLD AUTO: 15.3 %
NEUTROPHILS # BLD AUTO: 1.61 X10 (3) UL (ref 1.5–7.7)
NEUTROPHILS # BLD AUTO: 1.61 X10(3) UL (ref 1.5–7.7)
NEUTROPHILS NFR BLD AUTO: 61.7 %
OSMOLALITY SERPL CALC.SUM OF ELEC: 290 MOSM/KG (ref 275–295)
PLATELET # BLD AUTO: 115 10(3)UL (ref 150–450)
POTASSIUM SERPL-SCNC: 3.9 MMOL/L (ref 3.5–5.1)
PROT SERPL-MCNC: 6.9 G/DL (ref 6.4–8.2)
RBC # BLD AUTO: 3.8 X10(6)UL
SODIUM SERPL-SCNC: 140 MMOL/L (ref 136–145)
WBC # BLD AUTO: 2.6 X10(3) UL (ref 4–11)

## 2023-05-17 PROCEDURE — 86301 IMMUNOASSAY TUMOR CA 19-9: CPT

## 2023-05-17 PROCEDURE — 96417 CHEMO IV INFUS EACH ADDL SEQ: CPT

## 2023-05-17 PROCEDURE — 96411 CHEMO IV PUSH ADDL DRUG: CPT

## 2023-05-17 PROCEDURE — 96375 TX/PRO/DX INJ NEW DRUG ADDON: CPT

## 2023-05-17 PROCEDURE — 96415 CHEMO IV INFUSION ADDL HR: CPT

## 2023-05-17 PROCEDURE — 80053 COMPREHEN METABOLIC PANEL: CPT

## 2023-05-17 PROCEDURE — 96413 CHEMO IV INFUSION 1 HR: CPT

## 2023-05-17 PROCEDURE — 85025 COMPLETE CBC W/AUTO DIFF WBC: CPT

## 2023-05-17 PROCEDURE — 99215 OFFICE O/P EST HI 40 MIN: CPT | Performed by: INTERNAL MEDICINE

## 2023-05-17 PROCEDURE — 96368 THER/DIAG CONCURRENT INF: CPT

## 2023-05-17 RX ORDER — FLUOROURACIL 50 MG/ML
2400 INJECTION, SOLUTION INTRAVENOUS CONTINUOUS
Status: CANCELLED | OUTPATIENT
Start: 2023-05-17

## 2023-05-17 RX ORDER — FLUOROURACIL 50 MG/ML
400 INJECTION, SOLUTION INTRAVENOUS ONCE
Status: COMPLETED | OUTPATIENT
Start: 2023-05-17 | End: 2023-05-17

## 2023-05-17 RX ORDER — FLUOROURACIL 50 MG/ML
2400 INJECTION, SOLUTION INTRAVENOUS CONTINUOUS
Status: DISCONTINUED | OUTPATIENT
Start: 2023-05-17 | End: 2023-05-17

## 2023-05-17 RX ORDER — FLUOROURACIL 50 MG/ML
400 INJECTION, SOLUTION INTRAVENOUS ONCE
Status: CANCELLED | OUTPATIENT
Start: 2023-05-17

## 2023-05-17 RX ADMIN — FLUOROURACIL 5650 MG: 50 INJECTION, SOLUTION INTRAVENOUS at 14:33:00

## 2023-05-17 RX ADMIN — FLUOROURACIL 950 MG: 50 INJECTION, SOLUTION INTRAVENOUS at 14:26:00

## 2023-05-17 NOTE — PROGRESS NOTES
Pt here for C 2 D 1   Opdivo/Trazimera/Oxaliplatin/Leucovorin/5FU. Arrives Ambulating independently, accompanied by Self       Oral medications included in this regimen:  no    Patient confirms comprehension of cancer treatment schedule:  yes    Pregnancy screening:  Not applicable    Modifications in dose or schedule:  No    Medications appearance and physical integrity checked by RN.  Chemotherapy IV pump settings verified by 2 RNs:  yes     Frequency of blood return and site check throughout administration: Prior to administration, Prior to each drug and At completion of therapy     Infusion/treatment outcome:  patient tolerated treatment without incident    Education Record    Learner:  Patient  Barriers / Limitations:  None  Method:  Discussion  Education / instructions given:    Outcome:  Shows understanding    Discharged Home, Ambulating independently, accompanied by:Self    Patient/family verbalized understanding of future appointments: by [facility]

## 2023-05-17 NOTE — PROGRESS NOTES
Outpatient Oncology Care Plan   Problem list:   fatigue   Problems related to:   chemotherapy   Interventions:   provided general teaching   Expected outcomes:   understands plan of care   Progress towards outcome: making progress     Education Record   Learner: Patient   Barriers / Limitations: None   Method: Discussion   Outcome: Shows understanding   Comments:  Patient here for follow-up and planned C2D1 treatment. States energy levels and appetite are improving. Denies any pain. Still has occasional diarrhea that is managed with use of imodium. States neuropathy is managed at the time.

## 2023-05-24 RX ORDER — CAPECITABINE 500 MG/1
1500 TABLET, FILM COATED ORAL 2 TIMES DAILY
Qty: 84 TABLET | Refills: 3 | Status: SHIPPED | OUTPATIENT
Start: 2023-05-24 | End: 2023-05-30

## 2023-05-30 RX ORDER — CAPECITABINE 500 MG/1
1500 TABLET, FILM COATED ORAL 2 TIMES DAILY
Qty: 84 TABLET | Refills: 3 | Status: SHIPPED | OUTPATIENT
Start: 2023-05-30 | End: 2023-06-13

## 2023-06-05 ENCOUNTER — OFFICE VISIT (OUTPATIENT)
Dept: HEMATOLOGY/ONCOLOGY | Age: 53
End: 2023-06-05
Attending: INTERNAL MEDICINE
Payer: COMMERCIAL

## 2023-06-05 VITALS
BODY MASS INDEX: 30.03 KG/M2 | RESPIRATION RATE: 18 BRPM | TEMPERATURE: 98 F | WEIGHT: 234 LBS | HEIGHT: 73.82 IN | OXYGEN SATURATION: 97 % | HEART RATE: 64 BPM | SYSTOLIC BLOOD PRESSURE: 164 MMHG | DIASTOLIC BLOOD PRESSURE: 95 MMHG

## 2023-06-05 DIAGNOSIS — C15.5 MALIGNANT NEOPLASM OF LOWER THIRD OF ESOPHAGUS (HCC): Primary | ICD-10-CM

## 2023-06-05 LAB
ALBUMIN SERPL-MCNC: 3.5 G/DL (ref 3.4–5)
ALBUMIN/GLOB SERPL: 1 {RATIO} (ref 1–2)
ALP LIVER SERPL-CCNC: 124 U/L
ALT SERPL-CCNC: 46 U/L
ANION GAP SERPL CALC-SCNC: 4 MMOL/L (ref 0–18)
AST SERPL-CCNC: 30 U/L (ref 15–37)
BASOPHILS # BLD AUTO: 0.02 X10(3) UL (ref 0–0.2)
BASOPHILS NFR BLD AUTO: 0.6 %
BILIRUB SERPL-MCNC: 0.4 MG/DL (ref 0.1–2)
BUN BLD-MCNC: 13 MG/DL (ref 7–18)
CALCIUM BLD-MCNC: 9.1 MG/DL (ref 8.5–10.1)
CANCER AG19-9 SERPL-ACNC: 329.8 U/ML (ref ?–37)
CHLORIDE SERPL-SCNC: 107 MMOL/L (ref 98–112)
CO2 SERPL-SCNC: 28 MMOL/L (ref 21–32)
CREAT BLD-MCNC: 0.83 MG/DL
EOSINOPHIL # BLD AUTO: 0.05 X10(3) UL (ref 0–0.7)
EOSINOPHIL NFR BLD AUTO: 1.6 %
ERYTHROCYTE [DISTWIDTH] IN BLOOD BY AUTOMATED COUNT: 14.8 %
GFR SERPLBLD BASED ON 1.73 SQ M-ARVRAT: 105 ML/MIN/1.73M2 (ref 60–?)
GLOBULIN PLAS-MCNC: 3.5 G/DL (ref 2.8–4.4)
GLUCOSE BLD-MCNC: 100 MG/DL (ref 70–99)
HCT VFR BLD AUTO: 37.7 %
HGB BLD-MCNC: 12.1 G/DL
IMM GRANULOCYTES # BLD AUTO: 0.01 X10(3) UL (ref 0–1)
IMM GRANULOCYTES NFR BLD: 0.3 %
LYMPHOCYTES # BLD AUTO: 0.57 X10(3) UL (ref 1–4)
LYMPHOCYTES NFR BLD AUTO: 18 %
MCH RBC QN AUTO: 30.3 PG (ref 26–34)
MCHC RBC AUTO-ENTMCNC: 32.1 G/DL (ref 31–37)
MCV RBC AUTO: 94.5 FL
MONOCYTES # BLD AUTO: 0.64 X10(3) UL (ref 0.1–1)
MONOCYTES NFR BLD AUTO: 20.2 %
NEUTROPHILS # BLD AUTO: 1.88 X10 (3) UL (ref 1.5–7.7)
NEUTROPHILS # BLD AUTO: 1.88 X10(3) UL (ref 1.5–7.7)
NEUTROPHILS NFR BLD AUTO: 59.3 %
OSMOLALITY SERPL CALC.SUM OF ELEC: 288 MOSM/KG (ref 275–295)
PLATELET # BLD AUTO: 102 10(3)UL (ref 150–450)
POTASSIUM SERPL-SCNC: 3.9 MMOL/L (ref 3.5–5.1)
PROT SERPL-MCNC: 7 G/DL (ref 6.4–8.2)
RBC # BLD AUTO: 3.99 X10(6)UL
SODIUM SERPL-SCNC: 139 MMOL/L (ref 136–145)
WBC # BLD AUTO: 3.2 X10(3) UL (ref 4–11)

## 2023-06-05 PROCEDURE — 96413 CHEMO IV INFUSION 1 HR: CPT

## 2023-06-05 PROCEDURE — 96417 CHEMO IV INFUS EACH ADDL SEQ: CPT

## 2023-06-05 PROCEDURE — 80053 COMPREHEN METABOLIC PANEL: CPT

## 2023-06-05 PROCEDURE — 86301 IMMUNOASSAY TUMOR CA 19-9: CPT

## 2023-06-05 PROCEDURE — 85025 COMPLETE CBC W/AUTO DIFF WBC: CPT

## 2023-06-05 PROCEDURE — 96415 CHEMO IV INFUSION ADDL HR: CPT

## 2023-06-05 PROCEDURE — 96375 TX/PRO/DX INJ NEW DRUG ADDON: CPT

## 2023-06-05 PROCEDURE — 99215 OFFICE O/P EST HI 40 MIN: CPT | Performed by: INTERNAL MEDICINE

## 2023-06-05 RX ORDER — UREA 200 MG/G
1 GEL TOPICAL AS NEEDED
Qty: 85 G | Refills: 0 | Status: SHIPPED | OUTPATIENT
Start: 2023-06-05

## 2023-06-05 NOTE — PROGRESS NOTES
Outpatient Oncology Care Plan   Problem list:   fatigue   Problems related to:   chemotherapy   Interventions:   provided general teaching   Expected outcomes:   understands plan of care   Progress towards outcome: making progress     Education Record   Learner: Patient   Barriers / Limitations: None   Method: Discussion   Outcome: Shows understanding   Comments:  Patient here for follow-up and planned C3D15 treatment. Still having neuropathy and cold sensitivity. Has received capecitabine pills. emery levels are low. Denies any additional symptoms.

## 2023-06-05 NOTE — PROGRESS NOTES
Pt here for C3D1 tranzimera/keytruda/oxali. Arrives Ambulating independently, accompanied by Self       Oral medications included in this regimen:  yes - capecitabine (xeloda)    Patient confirms comprehension of cancer treatment schedule:  yes    Pregnancy screening:  Not applicable    Modifications in dose or schedule:  No    Medications appearance and physical integrity checked by RN.  Chemotherapy IV pump settings verified by 2 RNs:  yes     Frequency of blood return and site check throughout administration: Prior to administration, Prior to each drug and At completion of therapy     Infusion/treatment outcome:  patient tolerated treatment without incident    Education Record    Learner:  Patient  Barriers / Limitations:  None  Method:  Discussion and Reinforcement  Education / instructions given:  schedule  Outcome:  Shows understanding    Discharged Home, Ambulating independently, accompanied by:Self    Patient/family verbalized understanding of future appointments: by [facility]

## 2023-06-22 ENCOUNTER — SOCIAL WORK SERVICES (OUTPATIENT)
Dept: HEMATOLOGY/ONCOLOGY | Facility: HOSPITAL | Age: 53
End: 2023-06-22

## 2023-06-22 NOTE — PROGRESS NOTES
spoke with patient and completed Disability Form, faxing to Frank at D#[phone] and sending copy to patient via [address]

## 2023-06-26 ENCOUNTER — OFFICE VISIT (OUTPATIENT)
Dept: HEMATOLOGY/ONCOLOGY | Age: 53
End: 2023-06-26
Attending: INTERNAL MEDICINE
Payer: COMMERCIAL

## 2023-06-26 VITALS
OXYGEN SATURATION: 97 % | HEART RATE: 67 BPM | WEIGHT: 232.5 LBS | TEMPERATURE: 96 F | SYSTOLIC BLOOD PRESSURE: 143 MMHG | HEIGHT: 73.82 IN | BODY MASS INDEX: 29.84 KG/M2 | RESPIRATION RATE: 18 BRPM | DIASTOLIC BLOOD PRESSURE: 78 MMHG

## 2023-06-26 DIAGNOSIS — T45.1X5A CHEMOTHERAPY-INDUCED NEUTROPENIA (HCC): ICD-10-CM

## 2023-06-26 DIAGNOSIS — C15.5 MALIGNANT NEOPLASM OF LOWER THIRD OF ESOPHAGUS (HCC): Primary | ICD-10-CM

## 2023-06-26 DIAGNOSIS — D69.6 THROMBOCYTOPENIA (HCC): ICD-10-CM

## 2023-06-26 DIAGNOSIS — D70.1 CHEMOTHERAPY-INDUCED NEUTROPENIA (HCC): ICD-10-CM

## 2023-06-26 LAB
ALBUMIN SERPL-MCNC: 3.2 G/DL (ref 3.4–5)
ALBUMIN/GLOB SERPL: 1 {RATIO} (ref 1–2)
ALP LIVER SERPL-CCNC: 126 U/L
ALT SERPL-CCNC: 51 U/L
ANION GAP SERPL CALC-SCNC: 3 MMOL/L (ref 0–18)
AST SERPL-CCNC: 43 U/L (ref 15–37)
BASOPHILS # BLD AUTO: 0.01 X10(3) UL (ref 0–0.2)
BASOPHILS NFR BLD AUTO: 0.4 %
BILIRUB SERPL-MCNC: 0.6 MG/DL (ref 0.1–2)
BUN BLD-MCNC: 11 MG/DL (ref 7–18)
CALCIUM BLD-MCNC: 8.7 MG/DL (ref 8.5–10.1)
CANCER AG19-9 SERPL-ACNC: 79.5 U/ML (ref ?–37)
CHLORIDE SERPL-SCNC: 111 MMOL/L (ref 98–112)
CO2 SERPL-SCNC: 28 MMOL/L (ref 21–32)
CREAT BLD-MCNC: 0.84 MG/DL
EOSINOPHIL # BLD AUTO: 0.08 X10(3) UL (ref 0–0.7)
EOSINOPHIL NFR BLD AUTO: 3.4 %
ERYTHROCYTE [DISTWIDTH] IN BLOOD BY AUTOMATED COUNT: 16.7 %
GFR SERPLBLD BASED ON 1.73 SQ M-ARVRAT: 104 ML/MIN/1.73M2 (ref 60–?)
GLOBULIN PLAS-MCNC: 3.2 G/DL (ref 2.8–4.4)
GLUCOSE BLD-MCNC: 124 MG/DL (ref 70–99)
HCT VFR BLD AUTO: 36.8 %
HGB BLD-MCNC: 11.9 G/DL
IMM GRANULOCYTES # BLD AUTO: 0 X10(3) UL (ref 0–1)
IMM GRANULOCYTES NFR BLD: 0 %
LYMPHOCYTES # BLD AUTO: 0.41 X10(3) UL (ref 1–4)
LYMPHOCYTES NFR BLD AUTO: 17.6 %
MCH RBC QN AUTO: 31.2 PG (ref 26–34)
MCHC RBC AUTO-ENTMCNC: 32.3 G/DL (ref 31–37)
MCV RBC AUTO: 96.3 FL
MONOCYTES # BLD AUTO: 0.4 X10(3) UL (ref 0.1–1)
MONOCYTES NFR BLD AUTO: 17.2 %
NEUTROPHILS # BLD AUTO: 1.43 X10 (3) UL (ref 1.5–7.7)
NEUTROPHILS # BLD AUTO: 1.43 X10(3) UL (ref 1.5–7.7)
NEUTROPHILS NFR BLD AUTO: 61.4 %
OSMOLALITY SERPL CALC.SUM OF ELEC: 295 MOSM/KG (ref 275–295)
PLATELET # BLD AUTO: 53 10(3)UL (ref 150–450)
POTASSIUM SERPL-SCNC: 3.4 MMOL/L (ref 3.5–5.1)
PROT SERPL-MCNC: 6.4 G/DL (ref 6.4–8.2)
RBC # BLD AUTO: 3.82 X10(6)UL
SODIUM SERPL-SCNC: 142 MMOL/L (ref 136–145)
WBC # BLD AUTO: 2.3 X10(3) UL (ref 4–11)

## 2023-06-26 PROCEDURE — 36591 DRAW BLOOD OFF VENOUS DEVICE: CPT

## 2023-06-26 PROCEDURE — 86301 IMMUNOASSAY TUMOR CA 19-9: CPT

## 2023-06-26 PROCEDURE — 80053 COMPREHEN METABOLIC PANEL: CPT

## 2023-06-26 PROCEDURE — 99215 OFFICE O/P EST HI 40 MIN: CPT | Performed by: NURSE PRACTITIONER

## 2023-06-26 PROCEDURE — 85025 COMPLETE CBC W/AUTO DIFF WBC: CPT

## 2023-06-26 NOTE — PROGRESS NOTES
No treatment today per Munira GOMEZ due to low plts. Patient updated and educated on risks of low platelets. Verbalized he will call if he starts to have any bleeding. To come back next week. Patient will get appt from [place]. Port deaccessed and discharged in stable condition.

## 2023-07-03 ENCOUNTER — OFFICE VISIT (OUTPATIENT)
Dept: HEMATOLOGY/ONCOLOGY | Age: 53
End: 2023-07-03
Attending: INTERNAL MEDICINE
Payer: COMMERCIAL

## 2023-07-03 VITALS
BODY MASS INDEX: 29.84 KG/M2 | DIASTOLIC BLOOD PRESSURE: 84 MMHG | SYSTOLIC BLOOD PRESSURE: 159 MMHG | OXYGEN SATURATION: 97 % | HEART RATE: 59 BPM | WEIGHT: 232.5 LBS | HEIGHT: 73.82 IN | RESPIRATION RATE: 18 BRPM | TEMPERATURE: 97 F

## 2023-07-03 DIAGNOSIS — C15.5 MALIGNANT NEOPLASM OF LOWER THIRD OF ESOPHAGUS (HCC): Primary | ICD-10-CM

## 2023-07-03 LAB
ALBUMIN SERPL-MCNC: 3.4 G/DL (ref 3.4–5)
ALBUMIN/GLOB SERPL: 1 {RATIO} (ref 1–2)
ALP LIVER SERPL-CCNC: 134 U/L
ALT SERPL-CCNC: 55 U/L
ANION GAP SERPL CALC-SCNC: 1 MMOL/L (ref 0–18)
AST SERPL-CCNC: 43 U/L (ref 15–37)
BASOPHILS # BLD AUTO: 0.01 X10(3) UL (ref 0–0.2)
BASOPHILS NFR BLD AUTO: 0.4 %
BILIRUB SERPL-MCNC: 0.5 MG/DL (ref 0.1–2)
BUN BLD-MCNC: 13 MG/DL (ref 7–18)
CALCIUM BLD-MCNC: 8.9 MG/DL (ref 8.5–10.1)
CANCER AG19-9 SERPL-ACNC: 54.4 U/ML (ref ?–37)
CHLORIDE SERPL-SCNC: 108 MMOL/L (ref 98–112)
CO2 SERPL-SCNC: 28 MMOL/L (ref 21–32)
CREAT BLD-MCNC: 0.74 MG/DL
EOSINOPHIL # BLD AUTO: 0.12 X10(3) UL (ref 0–0.7)
EOSINOPHIL NFR BLD AUTO: 4.3 %
ERYTHROCYTE [DISTWIDTH] IN BLOOD BY AUTOMATED COUNT: 17.1 %
GFR SERPLBLD BASED ON 1.73 SQ M-ARVRAT: 108 ML/MIN/1.73M2 (ref 60–?)
GLOBULIN PLAS-MCNC: 3.4 G/DL (ref 2.8–4.4)
GLUCOSE BLD-MCNC: 99 MG/DL (ref 70–99)
HCT VFR BLD AUTO: 37.5 %
HGB BLD-MCNC: 12.2 G/DL
IMM GRANULOCYTES # BLD AUTO: 0.01 X10(3) UL (ref 0–1)
IMM GRANULOCYTES NFR BLD: 0.4 %
LYMPHOCYTES # BLD AUTO: 0.62 X10(3) UL (ref 1–4)
LYMPHOCYTES NFR BLD AUTO: 22.4 %
MCH RBC QN AUTO: 31.4 PG (ref 26–34)
MCHC RBC AUTO-ENTMCNC: 32.5 G/DL (ref 31–37)
MCV RBC AUTO: 96.4 FL
MONOCYTES # BLD AUTO: 0.44 X10(3) UL (ref 0.1–1)
MONOCYTES NFR BLD AUTO: 15.9 %
NEUTROPHILS # BLD AUTO: 1.57 X10 (3) UL (ref 1.5–7.7)
NEUTROPHILS # BLD AUTO: 1.57 X10(3) UL (ref 1.5–7.7)
NEUTROPHILS NFR BLD AUTO: 56.6 %
OSMOLALITY SERPL CALC.SUM OF ELEC: 284 MOSM/KG (ref 275–295)
PLATELET # BLD AUTO: 61 10(3)UL (ref 150–450)
POTASSIUM SERPL-SCNC: 3.8 MMOL/L (ref 3.5–5.1)
PROT SERPL-MCNC: 6.8 G/DL (ref 6.4–8.2)
RBC # BLD AUTO: 3.89 X10(6)UL
SODIUM SERPL-SCNC: 137 MMOL/L (ref 136–145)
WBC # BLD AUTO: 2.8 X10(3) UL (ref 4–11)

## 2023-07-03 PROCEDURE — 86301 IMMUNOASSAY TUMOR CA 19-9: CPT

## 2023-07-03 PROCEDURE — 99214 OFFICE O/P EST MOD 30 MIN: CPT | Performed by: INTERNAL MEDICINE

## 2023-07-03 PROCEDURE — 80053 COMPREHEN METABOLIC PANEL: CPT

## 2023-07-03 PROCEDURE — 36591 DRAW BLOOD OFF VENOUS DEVICE: CPT

## 2023-07-03 PROCEDURE — 85025 COMPLETE CBC W/AUTO DIFF WBC: CPT

## 2023-07-05 ENCOUNTER — TELEPHONE (OUTPATIENT)
Dept: HEMATOLOGY/ONCOLOGY | Facility: HOSPITAL | Age: 53
End: 2023-07-05

## 2023-07-05 DIAGNOSIS — I10 ESSENTIAL HYPERTENSION: Primary | ICD-10-CM

## 2023-07-05 RX ORDER — METOPROLOL SUCCINATE 50 MG/1
TABLET, EXTENDED RELEASE ORAL
Qty: 90 TABLET | Refills: 0 | Status: SHIPPED | OUTPATIENT
Start: 2023-07-05

## 2023-07-05 NOTE — TELEPHONE ENCOUNTER
Received call from pharmacy asking for status on Xeloda and if patient needs additional fills. Per MD note-  His platelet count is to low for treatment. Suspect prolonged thrombocytopenia due to Xeloda. Will request that we switch back to 5-FU.

## 2023-07-10 ENCOUNTER — DOCUMENTATION ONLY (OUTPATIENT)
Dept: HEMATOLOGY/ONCOLOGY | Facility: HOSPITAL | Age: 53
End: 2023-07-10

## 2023-07-10 ENCOUNTER — OFFICE VISIT (OUTPATIENT)
Dept: HEMATOLOGY/ONCOLOGY | Age: 53
End: 2023-07-10
Attending: INTERNAL MEDICINE
Payer: COMMERCIAL

## 2023-07-10 VITALS
DIASTOLIC BLOOD PRESSURE: 88 MMHG | RESPIRATION RATE: 16 BRPM | HEIGHT: 73.82 IN | HEART RATE: 53 BPM | SYSTOLIC BLOOD PRESSURE: 165 MMHG | TEMPERATURE: 98 F | WEIGHT: 231 LBS | OXYGEN SATURATION: 99 % | BODY MASS INDEX: 29.65 KG/M2

## 2023-07-10 DIAGNOSIS — C15.5 MALIGNANT NEOPLASM OF LOWER THIRD OF ESOPHAGUS (HCC): Primary | ICD-10-CM

## 2023-07-10 LAB
ALBUMIN SERPL-MCNC: 3.4 G/DL (ref 3.4–5)
ALBUMIN/GLOB SERPL: 1 {RATIO} (ref 1–2)
ALP LIVER SERPL-CCNC: 128 U/L
ALT SERPL-CCNC: 35 U/L
ANION GAP SERPL CALC-SCNC: 2 MMOL/L (ref 0–18)
AST SERPL-CCNC: 29 U/L (ref 15–37)
BASOPHILS # BLD AUTO: 0.02 X10(3) UL (ref 0–0.2)
BASOPHILS NFR BLD AUTO: 0.7 %
BILIRUB SERPL-MCNC: 0.5 MG/DL (ref 0.1–2)
BUN BLD-MCNC: 12 MG/DL (ref 7–18)
CALCIUM BLD-MCNC: 8.7 MG/DL (ref 8.5–10.1)
CANCER AG19-9 SERPL-ACNC: 35.2 U/ML (ref ?–37)
CHLORIDE SERPL-SCNC: 108 MMOL/L (ref 98–112)
CO2 SERPL-SCNC: 27 MMOL/L (ref 21–32)
CREAT BLD-MCNC: 0.86 MG/DL
EOSINOPHIL # BLD AUTO: 0.1 X10(3) UL (ref 0–0.7)
EOSINOPHIL NFR BLD AUTO: 3.4 %
ERYTHROCYTE [DISTWIDTH] IN BLOOD BY AUTOMATED COUNT: 16.7 %
GFR SERPLBLD BASED ON 1.73 SQ M-ARVRAT: 104 ML/MIN/1.73M2 (ref 60–?)
GLOBULIN PLAS-MCNC: 3.4 G/DL (ref 2.8–4.4)
GLUCOSE BLD-MCNC: 126 MG/DL (ref 70–99)
HCT VFR BLD AUTO: 37.3 %
HGB BLD-MCNC: 12.1 G/DL
IMM GRANULOCYTES # BLD AUTO: 0.01 X10(3) UL (ref 0–1)
IMM GRANULOCYTES NFR BLD: 0.3 %
LYMPHOCYTES # BLD AUTO: 0.55 X10(3) UL (ref 1–4)
LYMPHOCYTES NFR BLD AUTO: 19 %
MCH RBC QN AUTO: 31.5 PG (ref 26–34)
MCHC RBC AUTO-ENTMCNC: 32.4 G/DL (ref 31–37)
MCV RBC AUTO: 97.1 FL
MONOCYTES # BLD AUTO: 0.42 X10(3) UL (ref 0.1–1)
MONOCYTES NFR BLD AUTO: 14.5 %
NEUTROPHILS # BLD AUTO: 1.8 X10 (3) UL (ref 1.5–7.7)
NEUTROPHILS # BLD AUTO: 1.8 X10(3) UL (ref 1.5–7.7)
NEUTROPHILS NFR BLD AUTO: 62.1 %
OSMOLALITY SERPL CALC.SUM OF ELEC: 285 MOSM/KG (ref 275–295)
PLATELET # BLD AUTO: 75 10(3)UL (ref 150–450)
POTASSIUM SERPL-SCNC: 4 MMOL/L (ref 3.5–5.1)
PROT SERPL-MCNC: 6.8 G/DL (ref 6.4–8.2)
RBC # BLD AUTO: 3.84 X10(6)UL
SODIUM SERPL-SCNC: 137 MMOL/L (ref 136–145)
WBC # BLD AUTO: 2.9 X10(3) UL (ref 4–11)

## 2023-07-10 PROCEDURE — 99215 OFFICE O/P EST HI 40 MIN: CPT | Performed by: INTERNAL MEDICINE

## 2023-07-10 PROCEDURE — 85025 COMPLETE CBC W/AUTO DIFF WBC: CPT

## 2023-07-10 PROCEDURE — 80053 COMPREHEN METABOLIC PANEL: CPT

## 2023-07-10 PROCEDURE — 96375 TX/PRO/DX INJ NEW DRUG ADDON: CPT

## 2023-07-10 PROCEDURE — 96417 CHEMO IV INFUS EACH ADDL SEQ: CPT

## 2023-07-10 PROCEDURE — 96413 CHEMO IV INFUSION 1 HR: CPT

## 2023-07-10 PROCEDURE — 96415 CHEMO IV INFUSION ADDL HR: CPT

## 2023-07-10 PROCEDURE — 86301 IMMUNOASSAY TUMOR CA 19-9: CPT

## 2023-07-10 RX ORDER — LIDOCAINE AND PRILOCAINE 25; 25 MG/G; MG/G
CREAM TOPICAL
Qty: 1 EACH | Refills: 0 | Status: SHIPPED | OUTPATIENT
Start: 2023-07-10

## 2023-07-10 RX ORDER — FLUOROURACIL 50 MG/ML
2400 INJECTION, SOLUTION INTRAVENOUS CONTINUOUS
Status: CANCELLED | OUTPATIENT
Start: 2023-07-10

## 2023-07-10 RX ORDER — FLUOROURACIL 50 MG/ML
2400 INJECTION, SOLUTION INTRAVENOUS CONTINUOUS
Status: DISCONTINUED | OUTPATIENT
Start: 2023-07-10 | End: 2023-07-10

## 2023-07-10 RX ADMIN — FLUOROURACIL 5650 MG: 50 INJECTION, SOLUTION INTRAVENOUS at 14:11:00

## 2023-07-10 NOTE — PROGRESS NOTES
Pt here for C4D4 fulfox,trazimera,keytruda  . Arrives Ambulating independently, accompanied by Self       Oral medications included in this regimen:  no    Patient confirms comprehension of cancer treatment schedule:  yes    Pregnancy screening:  Not applicable    Modifications in dose or schedule:  No    Medications appearance and physical integrity checked by RN.  Chemotherapy IV pump settings verified by 2 RNs:  yes     Frequency of blood return and site check throughout administration: Prior to administration     Infusion/treatment outcome:  patient tolerated treatment without incident    Education Record    Learner:  Patient  Barriers / Limitations:  None  Method:  Brief focused  Education / instructions given:  chemo admin  Outcome:  Shows understanding    Discharged Home, Ambulating independently, accompanied by:Self    Patient/family verbalized understanding of future appointments: by [facility]

## 2023-07-24 ENCOUNTER — OFFICE VISIT (OUTPATIENT)
Dept: HEMATOLOGY/ONCOLOGY | Age: 53
End: 2023-07-24
Attending: INTERNAL MEDICINE
Payer: COMMERCIAL

## 2023-07-24 VITALS
HEART RATE: 55 BPM | OXYGEN SATURATION: 97 % | WEIGHT: 226.5 LBS | TEMPERATURE: 97 F | SYSTOLIC BLOOD PRESSURE: 163 MMHG | HEIGHT: 73.82 IN | RESPIRATION RATE: 16 BRPM | DIASTOLIC BLOOD PRESSURE: 90 MMHG | BODY MASS INDEX: 29.07 KG/M2

## 2023-07-24 DIAGNOSIS — D69.6 THROMBOCYTOPENIA (HCC): ICD-10-CM

## 2023-07-24 DIAGNOSIS — C15.5 MALIGNANT NEOPLASM OF LOWER THIRD OF ESOPHAGUS (HCC): Primary | ICD-10-CM

## 2023-07-24 LAB
ALBUMIN SERPL-MCNC: 3.5 G/DL (ref 3.4–5)
ALBUMIN/GLOB SERPL: 1 {RATIO} (ref 1–2)
ALP LIVER SERPL-CCNC: 128 U/L
ALT SERPL-CCNC: 37 U/L
ANION GAP SERPL CALC-SCNC: 1 MMOL/L (ref 0–18)
AST SERPL-CCNC: 34 U/L (ref 15–37)
BASOPHILS # BLD AUTO: 0.01 X10(3) UL (ref 0–0.2)
BASOPHILS NFR BLD AUTO: 0.4 %
BILIRUB SERPL-MCNC: 0.6 MG/DL (ref 0.1–2)
BUN BLD-MCNC: 11 MG/DL (ref 7–18)
CALCIUM BLD-MCNC: 8.9 MG/DL (ref 8.5–10.1)
CANCER AG19-9 SERPL-ACNC: 21.5 U/ML (ref ?–37)
CHLORIDE SERPL-SCNC: 110 MMOL/L (ref 98–112)
CO2 SERPL-SCNC: 26 MMOL/L (ref 21–32)
CREAT BLD-MCNC: 0.76 MG/DL
EGFRCR SERPLBLD CKD-EPI 2021: 107 ML/MIN/1.73M2 (ref 60–?)
EOSINOPHIL # BLD AUTO: 0.07 X10(3) UL (ref 0–0.7)
EOSINOPHIL NFR BLD AUTO: 2.5 %
ERYTHROCYTE [DISTWIDTH] IN BLOOD BY AUTOMATED COUNT: 16 %
GLOBULIN PLAS-MCNC: 3.6 G/DL (ref 2.8–4.4)
GLUCOSE BLD-MCNC: 96 MG/DL (ref 70–99)
HCT VFR BLD AUTO: 37 %
HGB BLD-MCNC: 12.1 G/DL
IMM GRANULOCYTES # BLD AUTO: 0.01 X10(3) UL (ref 0–1)
IMM GRANULOCYTES NFR BLD: 0.4 %
LYMPHOCYTES # BLD AUTO: 0.61 X10(3) UL (ref 1–4)
LYMPHOCYTES NFR BLD AUTO: 21.9 %
MCH RBC QN AUTO: 31.5 PG (ref 26–34)
MCHC RBC AUTO-ENTMCNC: 32.7 G/DL (ref 31–37)
MCV RBC AUTO: 96.4 FL
MONOCYTES # BLD AUTO: 0.52 X10(3) UL (ref 0.1–1)
MONOCYTES NFR BLD AUTO: 18.6 %
NEUTROPHILS # BLD AUTO: 1.57 X10 (3) UL (ref 1.5–7.7)
NEUTROPHILS # BLD AUTO: 1.57 X10(3) UL (ref 1.5–7.7)
NEUTROPHILS NFR BLD AUTO: 56.2 %
OSMOLALITY SERPL CALC.SUM OF ELEC: 283 MOSM/KG (ref 275–295)
PLATELET # BLD AUTO: 91 10(3)UL (ref 150–450)
POTASSIUM SERPL-SCNC: 4 MMOL/L (ref 3.5–5.1)
PROT SERPL-MCNC: 7.1 G/DL (ref 6.4–8.2)
RBC # BLD AUTO: 3.84 X10(6)UL
SODIUM SERPL-SCNC: 137 MMOL/L (ref 136–145)
WBC # BLD AUTO: 2.8 X10(3) UL (ref 4–11)

## 2023-07-24 PROCEDURE — 99215 OFFICE O/P EST HI 40 MIN: CPT | Performed by: NURSE PRACTITIONER

## 2023-07-24 PROCEDURE — 96415 CHEMO IV INFUSION ADDL HR: CPT

## 2023-07-24 PROCEDURE — 96368 THER/DIAG CONCURRENT INF: CPT

## 2023-07-24 PROCEDURE — 96375 TX/PRO/DX INJ NEW DRUG ADDON: CPT

## 2023-07-24 PROCEDURE — 80053 COMPREHEN METABOLIC PANEL: CPT

## 2023-07-24 PROCEDURE — 96413 CHEMO IV INFUSION 1 HR: CPT

## 2023-07-24 PROCEDURE — 96417 CHEMO IV INFUS EACH ADDL SEQ: CPT

## 2023-07-24 PROCEDURE — 86301 IMMUNOASSAY TUMOR CA 19-9: CPT

## 2023-07-24 PROCEDURE — 85025 COMPLETE CBC W/AUTO DIFF WBC: CPT

## 2023-07-24 RX ORDER — FLUOROURACIL 50 MG/ML
2400 INJECTION, SOLUTION INTRAVENOUS CONTINUOUS
Status: CANCELLED | OUTPATIENT
Start: 2023-07-24

## 2023-07-24 RX ORDER — FLUOROURACIL 50 MG/ML
2400 INJECTION, SOLUTION INTRAVENOUS CONTINUOUS
Status: DISCONTINUED | OUTPATIENT
Start: 2023-07-24 | End: 2023-07-24

## 2023-07-24 RX ADMIN — FLUOROURACIL 5650 MG: 50 INJECTION, SOLUTION INTRAVENOUS at 13:46:00

## 2023-07-24 NOTE — PROGRESS NOTES
Outpatient Oncology Care Plan   Problem list:   fatigue   Problems related to:   side effect of treatment   Interventions:   provided general teaching   Expected outcomes:   understands plan of care   Progress towards outcome: making progress     Education Record   Learner: Patient   Barriers / Limitations: None   Method: Discussion   Outcome: Shows understanding   Comments:  Patient here for follow-up and planned C5D1 treatment. Would like to discuss timing of next imaging and next steps. Energy levels are still somewhat low. Had some diarrhea on Saturday that was managed with imodium.

## 2023-07-24 NOTE — PROGRESS NOTES
Nutrition F/U Consultation     Patient Name: Efren Dutton. YOB: 1970  Medical Record Number: [id number]      Account Number: [de-identified]  Dietitian: Virgin Libman, RD, LDN     Date of visit: 7/24/2023     Diet Rx: po as tolerated (post-esophagectomy)     Pertinent Dx/PMH: Esophageal cancer     TX: Switched back to 5-FU (FOLFOX, trastuzumab, pembroluzumab); Xeloda 1500 mg BID D1-14 q21 days, Oxalplatin, Keytruda and Herceptin (due to national shortage of 5 FU)     1. Neoadjuvant chemoRT with carbo-taxol: 7/6/22-8/10/22  2. laparoscopic robotic-assisted esophagogastrectomy with feeding jejunostomy tube placement on 9/30/2022. Complications w/ esophageal leak  3. G-POEM 12/25/22     Other pertinent subjective/objective information: diet/wt/medical hx     Pertinent Meds: noted     Pertinent Labs: noted     Height: 6'2\"                IBW: 190 +/- 10%     WT HX:   07/24/23: 226 lbs 8 oz  07/10/23: 231 lbs  06/05/23: 234 lbs  05/01/23: 244 lbs  04/03/23: 251 lbs  03/06/23: 246 lbs  02/20/23: 244 lbs  02/06/23: 239 lbs 8 oz  01/23/23: 236 lbs  01/09/23: 233 lbs   12/12/22: 230 lbs  11/02/22: 242 lbs  10/03/22: 286 lbs 4.8 oz  09/07/22: 274 lbs     Estimated Nutrition Needs: 22-25  kcals/kg = 9228-5239 KCALS/d; 1.2 gms protein/kg = 127 gms/d     Assessment/Plan: RD f/u w/ pt in tx room today. Pt notes he continues to eat well so is surprised by wt loss. He does note diarrhea - floating, foul smelling and pale brown in color. Pt is not able to pinpoint certain foods but did note diarrhea after tamale and always has diarrhea w/ onions on pizza and continues to not be able to tolerated Ensure-type products. RD requested pt continue to monitor stools and determine +/- relation w/ food. Question pancreatic insufficiency; pt w/ ~25 lb wt loss x 4 mos. RD offered support/encouragement and will continue to monitor.       The [id number] makes medical notes like these available to patients in the interest of transparency. Please be advised this is a medical document. Medical documents are intended to carry relevant information, facts as evident, and the clinical opinion of the practitioner. The medical note is intended as peer to peer communication and may appear blunt or direct. It is written in medical language and may contain abbreviations or verbiage that are unfamiliar.

## 2023-07-24 NOTE — PROGRESS NOTES
Pt here for C5 D1 Trazimera/Folfox. Arrives Ambulating independently, accompanied by Self       Oral medications included in this regimen:  no    Patient confirms comprehension of cancer treatment schedule:  yes    Pregnancy screening:  Not applicable    Modifications in dose or schedule:  No    Medications appearance and physical integrity checked by RN. Chemotherapy IV pump settings verified by 2 RNs:  yes     Frequency of blood return and site check throughout administration: Prior to administration, Prior to each drug, and At completion of therapy     Infusion/treatment outcome:  patient tolerated treatment without incident    Education Record    Learner:  Patient  Barriers / Limitations:  None  Method:  Discussion  Education / instructions given:  Reviewed plan of care and follow up appts.   Outcome:  Shows understanding    Discharged Home, Ambulating independently, accompanied by:Self    Patient/family verbalized understanding of future appointments: by [facility]

## 2023-07-27 ENCOUNTER — HOSPITAL ENCOUNTER (OUTPATIENT)
Dept: NUCLEAR MEDICINE | Facility: HOSPITAL | Age: 53
Discharge: HOME | End: 2023-07-27
Attending: NURSE PRACTITIONER
Payer: COMMERCIAL

## 2023-07-27 DIAGNOSIS — C15.5 MALIGNANT NEOPLASM OF LOWER THIRD OF ESOPHAGUS (HCC): ICD-10-CM

## 2023-07-27 LAB — GLUCOSE BLD-MCNC: 93 MG/DL (ref 70–99)

## 2023-07-27 PROCEDURE — 78815 PET IMAGE W/CT SKULL-THIGH: CPT | Performed by: NURSE PRACTITIONER

## 2023-07-27 PROCEDURE — 82962 GLUCOSE BLOOD TEST: CPT

## 2023-08-04 ENCOUNTER — NURSE ONLY (OUTPATIENT)
Dept: HEMATOLOGY/ONCOLOGY | Age: 53
End: 2023-08-04
Attending: INTERNAL MEDICINE
Payer: COMMERCIAL

## 2023-08-04 ENCOUNTER — OFFICE VISIT (OUTPATIENT)
Dept: HEMATOLOGY/ONCOLOGY | Age: 53
End: 2023-08-04
Attending: INTERNAL MEDICINE
Payer: COMMERCIAL

## 2023-08-04 VITALS
BODY MASS INDEX: 28.02 KG/M2 | RESPIRATION RATE: 18 BRPM | HEIGHT: 73.82 IN | WEIGHT: 218.31 LBS | OXYGEN SATURATION: 97 % | SYSTOLIC BLOOD PRESSURE: 146 MMHG | DIASTOLIC BLOOD PRESSURE: 80 MMHG | HEART RATE: 51 BPM | TEMPERATURE: 98 F

## 2023-08-04 DIAGNOSIS — R05.1 ACUTE COUGH: Primary | ICD-10-CM

## 2023-08-04 DIAGNOSIS — C15.5 MALIGNANT NEOPLASM OF LOWER THIRD OF ESOPHAGUS (HCC): ICD-10-CM

## 2023-08-04 DIAGNOSIS — C15.5 MALIGNANT NEOPLASM OF LOWER THIRD OF ESOPHAGUS (HCC): Primary | ICD-10-CM

## 2023-08-04 LAB
ALBUMIN SERPL-MCNC: 3.4 G/DL (ref 3.4–5)
ALBUMIN/GLOB SERPL: 1 {RATIO} (ref 1–2)
ALP LIVER SERPL-CCNC: 119 U/L
ALT SERPL-CCNC: 42 U/L
ANION GAP SERPL CALC-SCNC: 2 MMOL/L (ref 0–18)
AST SERPL-CCNC: 38 U/L (ref 15–37)
BASOPHILS # BLD AUTO: 0.02 X10(3) UL (ref 0–0.2)
BASOPHILS NFR BLD AUTO: 0.6 %
BILIRUB SERPL-MCNC: 0.6 MG/DL (ref 0.1–2)
BUN BLD-MCNC: 9 MG/DL (ref 7–18)
CALCIUM BLD-MCNC: 8.9 MG/DL (ref 8.5–10.1)
CANCER AG19-9 SERPL-ACNC: 15.1 U/ML (ref ?–37)
CHLORIDE SERPL-SCNC: 107 MMOL/L (ref 98–112)
CO2 SERPL-SCNC: 27 MMOL/L (ref 21–32)
CREAT BLD-MCNC: 0.75 MG/DL
EGFRCR SERPLBLD CKD-EPI 2021: 108 ML/MIN/1.73M2 (ref 60–?)
EOSINOPHIL # BLD AUTO: 0.04 X10(3) UL (ref 0–0.7)
EOSINOPHIL NFR BLD AUTO: 1.1 %
ERYTHROCYTE [DISTWIDTH] IN BLOOD BY AUTOMATED COUNT: 16.1 %
GLOBULIN PLAS-MCNC: 3.5 G/DL (ref 2.8–4.4)
GLUCOSE BLD-MCNC: 98 MG/DL (ref 70–99)
HCT VFR BLD AUTO: 34.4 %
HGB BLD-MCNC: 11.5 G/DL
IMM GRANULOCYTES # BLD AUTO: 0.02 X10(3) UL (ref 0–1)
IMM GRANULOCYTES NFR BLD: 0.6 %
LYMPHOCYTES # BLD AUTO: 0.53 X10(3) UL (ref 1–4)
LYMPHOCYTES NFR BLD AUTO: 15.1 %
MCH RBC QN AUTO: 31.8 PG (ref 26–34)
MCHC RBC AUTO-ENTMCNC: 33.4 G/DL (ref 31–37)
MCV RBC AUTO: 95 FL
MONOCYTES # BLD AUTO: 0.7 X10(3) UL (ref 0.1–1)
MONOCYTES NFR BLD AUTO: 19.9 %
NEUTROPHILS # BLD AUTO: 2.2 X10 (3) UL (ref 1.5–7.7)
NEUTROPHILS # BLD AUTO: 2.2 X10(3) UL (ref 1.5–7.7)
NEUTROPHILS NFR BLD AUTO: 62.7 %
OSMOLALITY SERPL CALC.SUM OF ELEC: 281 MOSM/KG (ref 275–295)
PLATELET # BLD AUTO: 86 10(3)UL (ref 150–450)
POTASSIUM SERPL-SCNC: 3.8 MMOL/L (ref 3.5–5.1)
PROT SERPL-MCNC: 6.9 G/DL (ref 6.4–8.2)
RBC # BLD AUTO: 3.62 X10(6)UL
SODIUM SERPL-SCNC: 136 MMOL/L (ref 136–145)
WBC # BLD AUTO: 3.5 X10(3) UL (ref 4–11)

## 2023-08-04 PROCEDURE — 85025 COMPLETE CBC W/AUTO DIFF WBC: CPT

## 2023-08-04 PROCEDURE — 86301 IMMUNOASSAY TUMOR CA 19-9: CPT

## 2023-08-04 PROCEDURE — 99215 OFFICE O/P EST HI 40 MIN: CPT | Performed by: NURSE PRACTITIONER

## 2023-08-04 PROCEDURE — 36591 DRAW BLOOD OFF VENOUS DEVICE: CPT

## 2023-08-04 PROCEDURE — 80053 COMPREHEN METABOLIC PANEL: CPT

## 2023-08-04 RX ORDER — LEVOFLOXACIN 750 MG/1
750 TABLET ORAL DAILY
Qty: 5 TABLET | Refills: 0 | Status: SHIPPED | OUTPATIENT
Start: 2023-08-04 | End: 2023-08-09

## 2023-08-07 ENCOUNTER — OFFICE VISIT (OUTPATIENT)
Dept: HEMATOLOGY/ONCOLOGY | Age: 53
End: 2023-08-07
Attending: INTERNAL MEDICINE
Payer: COMMERCIAL

## 2023-08-07 VITALS
HEART RATE: 59 BPM | TEMPERATURE: 97 F | BODY MASS INDEX: 28.49 KG/M2 | HEIGHT: 73.82 IN | SYSTOLIC BLOOD PRESSURE: 164 MMHG | WEIGHT: 222 LBS | RESPIRATION RATE: 16 BRPM | DIASTOLIC BLOOD PRESSURE: 83 MMHG | OXYGEN SATURATION: 98 %

## 2023-08-07 DIAGNOSIS — C15.5 MALIGNANT NEOPLASM OF LOWER THIRD OF ESOPHAGUS (HCC): Primary | ICD-10-CM

## 2023-08-07 DIAGNOSIS — Z51.81 ENCOUNTER FOR MONITORING CARDIOTOXIC DRUG THERAPY: ICD-10-CM

## 2023-08-07 DIAGNOSIS — Z79.899 ENCOUNTER FOR MONITORING CARDIOTOXIC DRUG THERAPY: ICD-10-CM

## 2023-08-07 DIAGNOSIS — D69.59 CHEMOTHERAPY-INDUCED THROMBOCYTOPENIA: ICD-10-CM

## 2023-08-07 DIAGNOSIS — T45.1X5A CHEMOTHERAPY-INDUCED THROMBOCYTOPENIA: ICD-10-CM

## 2023-08-07 DIAGNOSIS — Z51.11 ENCOUNTER FOR CHEMOTHERAPY MANAGEMENT: ICD-10-CM

## 2023-08-07 PROCEDURE — 96417 CHEMO IV INFUS EACH ADDL SEQ: CPT

## 2023-08-07 PROCEDURE — 96413 CHEMO IV INFUSION 1 HR: CPT

## 2023-08-07 PROCEDURE — 96415 CHEMO IV INFUSION ADDL HR: CPT

## 2023-08-07 PROCEDURE — 96368 THER/DIAG CONCURRENT INF: CPT

## 2023-08-07 PROCEDURE — 96375 TX/PRO/DX INJ NEW DRUG ADDON: CPT

## 2023-08-07 RX ORDER — FLUOROURACIL 50 MG/ML
2400 INJECTION, SOLUTION INTRAVENOUS CONTINUOUS
Status: DISCONTINUED | OUTPATIENT
Start: 2023-08-07 | End: 2023-08-07

## 2023-08-07 RX ORDER — FLUOROURACIL 50 MG/ML
2400 INJECTION, SOLUTION INTRAVENOUS CONTINUOUS
Status: CANCELLED | OUTPATIENT
Start: 2023-08-07

## 2023-08-07 RX ADMIN — FLUOROURACIL 5650 MG: 50 INJECTION, SOLUTION INTRAVENOUS at 12:18:00

## 2023-08-07 NOTE — PROGRESS NOTES
Nutrition F/U Consultation     Patient Name: Mireya Deshpande. YOB: 1970  Medical Record Number: [id number]      Account Number: [de-identified]  Dietitian: Yinka Flannery RD, LDN     Date of visit: 08/07/2023     Diet Rx: po as tolerated (post-esophagectomy)     Pertinent Dx/PMH: Esophageal cancer     TX: Switched back to 5-FU (FOLFOX, trastuzumab, pembroluzumab); Xeloda 1500 mg BID D1-14 q21 days, Oxalplatin, Keytruda and Herceptin (due to national shortage of 5 FU)     1. Neoadjuvant chemoRT with carbo-taxol: 7/6/22-8/10/22  2. laparoscopic robotic-assisted esophagogastrectomy with feeding jejunostomy tube placement on 9/30/2022. Complications w/ esophageal leak  3. G-POEM 12/25/22     Other pertinent subjective/objective information: diet/wt/medical hx     Pertinent Meds: noted     Pertinent Labs: noted     Height: 6'2\"                IBW: 190 +/- 10%     WT HX:   08/07/23: 222 lbs  07/24/23: 226 lbs 8 oz  07/10/23: 231 lbs  06/05/23: 234 lbs  05/01/23: 244 lbs  04/03/23: 251 lbs  03/06/23: 246 lbs  02/20/23: 244 lbs  02/06/23: 239 lbs 8 oz     Estimated Nutrition Needs: 22-25  kcals/kg = 5976-3945 KCALS/d; 1.2 gms protein/kg = 127 gms/d     Assessment/Plan: RD f/u w/ pt in tx room today. Pt notes he continues to eat well so is surprised by wt loss. He noted perhaps related to recent upper respiratory virus and decreased po intake. Pt noted stools are improved denying diarrhea; however, pt continues to be cautious w/ what he eats as per h/o intolerances. Pt also notes ongoing early satiety. Pt looking forward to upcoming cruise w/ spouse. RD offered support/encouragement and will continue to monitor. The [id number] makes medical notes like these available to patients in the interest of transparency. Please be advised this is a medical document. Medical documents are intended to carry relevant information, facts as evident, and the clinical opinion of the practitioner. The medical note is intended as peer to peer communication and may appear blunt or direct. It is written in medical language and may contain abbreviations or verbiage that are unfamiliar.

## 2023-08-07 NOTE — PROGRESS NOTES
Education Record    Learner:  Patient    Disease / Diagnosis: esophageal ca  OTV D1C6 Folfox, trastuzumab    Barriers / Limitations:  None   Comments:    Method:  Discussion   Comments:    General Topics:  Plan of care reviewed   Comments:    Outcome:  Shows understanding   Comments:   Less diarrhea. Appetite is ok. Throat less sore, and less cough. Started abx yesterday. Energy level good earlier in the day, then more tired at 3pm.   Does need more rest periods with activity.    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

## 2023-08-07 NOTE — PROGRESS NOTES
Pt here for C6 D1 Trazimera/Folfox. Arrives Ambulating independently, accompanied by Self       Oral medications included in this regimen:  no    Patient confirms comprehension of cancer treatment schedule:  yes    Pregnancy screening:  Not applicable    Modifications in dose or schedule:  No    Medications appearance and physical integrity checked by RN. Chemotherapy IV pump settings verified by 2 RNs:  yes     Frequency of blood return and site check throughout administration: Prior to administration, Prior to each drug, and At completion of therapy     Infusion/treatment outcome:  patient tolerated treatment without incident    Education Record    Learner:  Patient  Barriers / Limitations:  None  Method:  Discussion  Education / instructions given:  Reviewed plan of care and follow up appts.   Outcome:  Shows understanding    Discharged Home, Ambulating independently, accompanied by:Self    Patient/family verbalized understanding of future appointments: by [facility]

## 2023-08-14 ENCOUNTER — PATIENT MESSAGE (OUTPATIENT)
Dept: HEMATOLOGY/ONCOLOGY | Age: 53
End: 2023-08-14

## 2023-08-14 DIAGNOSIS — F33.1 MAJOR DEPRESSIVE DISORDER, RECURRENT, MODERATE (HCC): ICD-10-CM

## 2023-08-14 RX ORDER — SERTRALINE HYDROCHLORIDE 100 MG/1
150 TABLET, FILM COATED ORAL DAILY
Qty: 135 TABLET | Refills: 1 | Status: SHIPPED | OUTPATIENT
Start: 2023-08-14

## 2023-08-28 ENCOUNTER — OFFICE VISIT (OUTPATIENT)
Dept: HEMATOLOGY/ONCOLOGY | Age: 53
End: 2023-08-28
Attending: INTERNAL MEDICINE
Payer: COMMERCIAL

## 2023-08-28 ENCOUNTER — SOCIAL WORK SERVICES (OUTPATIENT)
Dept: HEMATOLOGY/ONCOLOGY | Facility: HOSPITAL | Age: 53
End: 2023-08-28

## 2023-08-28 VITALS
RESPIRATION RATE: 18 BRPM | HEART RATE: 52 BPM | DIASTOLIC BLOOD PRESSURE: 76 MMHG | SYSTOLIC BLOOD PRESSURE: 132 MMHG | OXYGEN SATURATION: 99 % | BODY MASS INDEX: 28.11 KG/M2 | HEIGHT: 73.82 IN | WEIGHT: 219 LBS | TEMPERATURE: 97 F

## 2023-08-28 DIAGNOSIS — C15.5 MALIGNANT NEOPLASM OF LOWER THIRD OF ESOPHAGUS (HCC): Primary | ICD-10-CM

## 2023-08-28 LAB
ALBUMIN SERPL-MCNC: 3.1 G/DL (ref 3.4–5)
ALBUMIN/GLOB SERPL: 0.9 {RATIO} (ref 1–2)
ALP LIVER SERPL-CCNC: 120 U/L
ALT SERPL-CCNC: 37 U/L
ANION GAP SERPL CALC-SCNC: 3 MMOL/L (ref 0–18)
AST SERPL-CCNC: 35 U/L (ref 15–37)
BASOPHILS # BLD AUTO: 0.02 X10(3) UL (ref 0–0.2)
BASOPHILS NFR BLD AUTO: 0.8 %
BILIRUB SERPL-MCNC: 0.5 MG/DL (ref 0.1–2)
BUN BLD-MCNC: 14 MG/DL (ref 7–18)
CALCIUM BLD-MCNC: 8.7 MG/DL (ref 8.5–10.1)
CANCER AG19-9 SERPL-ACNC: 13.9 U/ML (ref ?–37)
CHLORIDE SERPL-SCNC: 107 MMOL/L (ref 98–112)
CO2 SERPL-SCNC: 29 MMOL/L (ref 21–32)
CREAT BLD-MCNC: 0.74 MG/DL
EGFRCR SERPLBLD CKD-EPI 2021: 108 ML/MIN/1.73M2 (ref 60–?)
EOSINOPHIL # BLD AUTO: 0.08 X10(3) UL (ref 0–0.7)
EOSINOPHIL NFR BLD AUTO: 3.1 %
ERYTHROCYTE [DISTWIDTH] IN BLOOD BY AUTOMATED COUNT: 16.3 %
GLOBULIN PLAS-MCNC: 3.4 G/DL (ref 2.8–4.4)
GLUCOSE BLD-MCNC: 111 MG/DL (ref 70–99)
HCT VFR BLD AUTO: 35.3 %
HGB BLD-MCNC: 11.4 G/DL
IMM GRANULOCYTES # BLD AUTO: 0.03 X10(3) UL (ref 0–1)
IMM GRANULOCYTES NFR BLD: 1.1 %
LYMPHOCYTES # BLD AUTO: 0.45 X10(3) UL (ref 1–4)
LYMPHOCYTES NFR BLD AUTO: 17.2 %
MCH RBC QN AUTO: 32 PG (ref 26–34)
MCHC RBC AUTO-ENTMCNC: 32.3 G/DL (ref 31–37)
MCV RBC AUTO: 99.2 FL
MONOCYTES # BLD AUTO: 0.56 X10(3) UL (ref 0.1–1)
MONOCYTES NFR BLD AUTO: 21.5 %
NEUTROPHILS # BLD AUTO: 1.47 X10 (3) UL (ref 1.5–7.7)
NEUTROPHILS # BLD AUTO: 1.47 X10(3) UL (ref 1.5–7.7)
NEUTROPHILS NFR BLD AUTO: 56.3 %
OSMOLALITY SERPL CALC.SUM OF ELEC: 289 MOSM/KG (ref 275–295)
PLATELET # BLD AUTO: 86 10(3)UL (ref 150–450)
POTASSIUM SERPL-SCNC: 3.9 MMOL/L (ref 3.5–5.1)
PROT SERPL-MCNC: 6.5 G/DL (ref 6.4–8.2)
RBC # BLD AUTO: 3.56 X10(6)UL
SODIUM SERPL-SCNC: 139 MMOL/L (ref 136–145)
TSI SER-ACNC: 0.42 MIU/ML (ref 0.36–3.74)
WBC # BLD AUTO: 2.6 X10(3) UL (ref 4–11)

## 2023-08-28 PROCEDURE — 84443 ASSAY THYROID STIM HORMONE: CPT

## 2023-08-28 PROCEDURE — 96413 CHEMO IV INFUSION 1 HR: CPT

## 2023-08-28 PROCEDURE — 85025 COMPLETE CBC W/AUTO DIFF WBC: CPT

## 2023-08-28 PROCEDURE — 80053 COMPREHEN METABOLIC PANEL: CPT

## 2023-08-28 PROCEDURE — 99215 OFFICE O/P EST HI 40 MIN: CPT | Performed by: INTERNAL MEDICINE

## 2023-08-28 PROCEDURE — 96375 TX/PRO/DX INJ NEW DRUG ADDON: CPT

## 2023-08-28 PROCEDURE — 86301 IMMUNOASSAY TUMOR CA 19-9: CPT

## 2023-08-28 PROCEDURE — 96367 TX/PROPH/DG ADDL SEQ IV INF: CPT

## 2023-08-28 PROCEDURE — 96417 CHEMO IV INFUS EACH ADDL SEQ: CPT

## 2023-08-28 RX ORDER — FLUOROURACIL 50 MG/ML
2400 INJECTION, SOLUTION INTRAVENOUS CONTINUOUS
Status: CANCELLED | OUTPATIENT
Start: 2023-08-28

## 2023-08-28 RX ORDER — FLUOROURACIL 50 MG/ML
2400 INJECTION, SOLUTION INTRAVENOUS CONTINUOUS
Status: DISCONTINUED | OUTPATIENT
Start: 2023-08-28 | End: 2023-08-28

## 2023-08-28 RX ADMIN — FLUOROURACIL 5650 MG: 50 INJECTION, SOLUTION INTRAVENOUS at 12:14:00

## 2023-08-28 NOTE — PROGRESS NOTES
Pt here for C7D1 . Arrives Ambulating independently, accompanied by Self       Oral medications included in this regimen:  no    Patient confirms comprehension of cancer treatment schedule:  yes    Pregnancy screening:  Not applicable    Modifications in dose or schedule:  Yes, no oxali today    Medications appearance and physical integrity checked by RN.  Chemotherapy IV pump settings verified by 2 RNs:  yes     Frequency of blood return and site check throughout administration: Prior to administration and At completion of therapy     Infusion/treatment outcome:  patient tolerated treatment without incident    Education Record    Learner:  Patient  Barriers / Limitations:  None  Method:  Discussion  Education / instructions given:  yes  Outcome:  Shows understanding    Discharged Home, Ambulating independently, accompanied by:Self    Patient/family verbalized understanding of future appointments: by [facility]

## 2023-08-28 NOTE — PROGRESS NOTES
Outpatient Oncology Care Plan   Problem list:   fatigue   Problems related to:   side effect of treatment   Interventions:   provided general teaching   Expected outcomes:   understands plan of care   Progress towards outcome: making progress     Education Record   Learner: Patient   Barriers / Limitations: None   Method: Discussion   Outcome: Shows understanding   Comments:  Patient here for follow-up and planned C7D1 treatment. States energy levels are still low. Has a new noticeable bruise to his chest. Denies any injury or trauma to site.

## 2023-08-28 NOTE — PROGRESS NOTES
Nutrition F/U Consultation     Patient Name: Surya Maria. YOB: 1970  Medical Record Number: [id number]      Account Number: [de-identified]  Dietitian: Dilan Garcia RD, LDN     Date of visit: 08/28/2023     Diet Rx: po as tolerated (post-esophagectomy)     Pertinent Dx/PMH: Esophageal cancer; pancreatic (head) cancer     TX:  5-FU/LV + Herceptin + Keytruda     1. Neoadjuvant chemoRT with carbo-taxol: 7/6/22-8/10/22  2. laparoscopic robotic-assisted esophagogastrectomy with feeding jejunostomy tube placement on 9/30/2022. Complications w/ esophageal leak  3. G-POEM (EGD and pylorectomy) 12/27/22     Other pertinent subjective/objective information: diet/wt/medical hx     Pertinent Meds: noted     Pertinent Labs: noted     Height: 6'2\"                IBW: 190 +/- 10%     WT HX:   08/28/23: 219 lbs  08/07/23: 222 lbs  07/24/23: 226 lbs 8 oz  07/10/23: 231 lbs  06/05/23: 234 lbs  05/01/23: 244 lbs  04/03/23: 251 lbs  03/06/23: 246 lbs  02/20/23: 244 lbs  02/06/23: 239 lbs 8 oz     Estimated Nutrition Needs: 22-25  kcals/kg = 2820-1658 KCALS/d; 1.3 gms protein/kg = 127 gms/d     Assessment/Plan: RD f/u w/ pt in tx room today. Pt notes just returning from [place] cruise this past Saturday. He noted eating well and enjoying. He noted only 2 bouts of loose stool during cruise attributing to food choice. RD offered support/encouragement and will continue to monitor. The [place] [id number] makes medical notes like these available to patients in the interest of transparency. Please be advised this is a medical document. Medical documents are intended to carry relevant information, facts as evident, and the clinical opinion of the practitioner. The medical note is intended as peer to peer communication and may appear blunt or direct. It is written in medical language and may contain abbreviations or verbiage that are unfamiliar.

## 2023-08-28 NOTE — PROGRESS NOTES
SW spoke with pt to discuss ways social work can assist and provide support. Pt requested documents for his disability claim. SW also assisted with an insurance form to be faxed. SW sent confirmation via [employer]. SW to remain available to assist and provide support. SHARON MckeonW   at [address], [facility], [address], [facility] Joe Perdomo  Ph: [phone]. [email or web address]  Fax: [phone]

## 2023-09-05 ENCOUNTER — APPOINTMENT (OUTPATIENT)
Dept: HEMATOLOGY/ONCOLOGY | Age: 53
End: 2023-09-05
Attending: INTERNAL MEDICINE
Payer: COMMERCIAL

## 2023-09-06 ENCOUNTER — OFFICE VISIT (OUTPATIENT)
Dept: HEMATOLOGY/ONCOLOGY | Age: 53
End: 2023-09-06
Attending: INTERNAL MEDICINE
Payer: COMMERCIAL

## 2023-09-06 DIAGNOSIS — C15.5 MALIGNANT NEOPLASM OF LOWER THIRD OF ESOPHAGUS (HCC): ICD-10-CM

## 2023-09-06 LAB
BASOPHILS # BLD AUTO: 0.03 X10(3) UL (ref 0–0.2)
BASOPHILS NFR BLD AUTO: 0.6 %
EOSINOPHIL # BLD AUTO: 0.12 X10(3) UL (ref 0–0.7)
EOSINOPHIL NFR BLD AUTO: 2.5 %
ERYTHROCYTE [DISTWIDTH] IN BLOOD BY AUTOMATED COUNT: 15.8 %
HCT VFR BLD AUTO: 39.5 %
HGB BLD-MCNC: 12.7 G/DL
IMM GRANULOCYTES # BLD AUTO: 0.09 X10(3) UL (ref 0–1)
IMM GRANULOCYTES NFR BLD: 1.9 %
LYMPHOCYTES # BLD AUTO: 0.57 X10(3) UL (ref 1–4)
LYMPHOCYTES NFR BLD AUTO: 11.7 %
MCH RBC QN AUTO: 32.1 PG (ref 26–34)
MCHC RBC AUTO-ENTMCNC: 32.2 G/DL (ref 31–37)
MCV RBC AUTO: 99.7 FL
MONOCYTES # BLD AUTO: 0.47 X10(3) UL (ref 0.1–1)
MONOCYTES NFR BLD AUTO: 9.7 %
NEUTROPHILS # BLD AUTO: 3.58 X10 (3) UL (ref 1.5–7.7)
NEUTROPHILS # BLD AUTO: 3.58 X10(3) UL (ref 1.5–7.7)
NEUTROPHILS NFR BLD AUTO: 73.6 %
PLATELET # BLD AUTO: 74 10(3)UL (ref 150–450)
RBC # BLD AUTO: 3.96 X10(6)UL
WBC # BLD AUTO: 4.9 X10(3) UL (ref 4–11)

## 2023-09-06 PROCEDURE — 85025 COMPLETE CBC W/AUTO DIFF WBC: CPT

## 2023-09-06 PROCEDURE — 36591 DRAW BLOOD OFF VENOUS DEVICE: CPT

## 2023-09-06 NOTE — PROGRESS NOTES
Here for lab draw. Pt voices no complaints today. CBC drawn. Results reviewed. No transfusion needed. Pt dc home ambulatory in stable condition, no complaints.  Has fu

## 2023-09-11 ENCOUNTER — NURSE NAVIGATOR ENCOUNTER (OUTPATIENT)
Dept: HEMATOLOGY/ONCOLOGY | Facility: HOSPITAL | Age: 53
End: 2023-09-11

## 2023-09-11 ENCOUNTER — OFFICE VISIT (OUTPATIENT)
Dept: HEMATOLOGY/ONCOLOGY | Age: 53
End: 2023-09-11
Attending: INTERNAL MEDICINE
Payer: COMMERCIAL

## 2023-09-11 VITALS
RESPIRATION RATE: 16 BRPM | TEMPERATURE: 98 F | HEIGHT: 73.82 IN | HEART RATE: 54 BPM | BODY MASS INDEX: 27.27 KG/M2 | DIASTOLIC BLOOD PRESSURE: 92 MMHG | OXYGEN SATURATION: 98 % | SYSTOLIC BLOOD PRESSURE: 158 MMHG | WEIGHT: 212.5 LBS

## 2023-09-11 DIAGNOSIS — C15.5 MALIGNANT NEOPLASM OF LOWER THIRD OF ESOPHAGUS (HCC): Primary | ICD-10-CM

## 2023-09-11 LAB
ALBUMIN SERPL-MCNC: 3.2 G/DL (ref 3.4–5)
ALBUMIN/GLOB SERPL: 1 {RATIO} (ref 1–2)
ALP LIVER SERPL-CCNC: 114 U/L
ALT SERPL-CCNC: 39 U/L
ANION GAP SERPL CALC-SCNC: 4 MMOL/L (ref 0–18)
AST SERPL-CCNC: 33 U/L (ref 15–37)
BASOPHILS # BLD AUTO: 0.01 X10(3) UL (ref 0–0.2)
BASOPHILS NFR BLD AUTO: 0.3 %
BILIRUB SERPL-MCNC: 0.6 MG/DL (ref 0.1–2)
BUN BLD-MCNC: 12 MG/DL (ref 7–18)
CALCIUM BLD-MCNC: 8.9 MG/DL (ref 8.5–10.1)
CANCER AG19-9 SERPL-ACNC: 12.3 U/ML (ref ?–37)
CHLORIDE SERPL-SCNC: 107 MMOL/L (ref 98–112)
CO2 SERPL-SCNC: 28 MMOL/L (ref 21–32)
CREAT BLD-MCNC: 0.79 MG/DL
EGFRCR SERPLBLD CKD-EPI 2021: 106 ML/MIN/1.73M2 (ref 60–?)
EOSINOPHIL # BLD AUTO: 0.09 X10(3) UL (ref 0–0.7)
EOSINOPHIL NFR BLD AUTO: 2.3 %
ERYTHROCYTE [DISTWIDTH] IN BLOOD BY AUTOMATED COUNT: 15.9 %
GLOBULIN PLAS-MCNC: 3.3 G/DL (ref 2.8–4.4)
GLUCOSE BLD-MCNC: 120 MG/DL (ref 70–99)
HCT VFR BLD AUTO: 37.6 %
HGB BLD-MCNC: 12.2 G/DL
IMM GRANULOCYTES # BLD AUTO: 0.02 X10(3) UL (ref 0–1)
IMM GRANULOCYTES NFR BLD: 0.5 %
LYMPHOCYTES # BLD AUTO: 0.52 X10(3) UL (ref 1–4)
LYMPHOCYTES NFR BLD AUTO: 13.4 %
MCH RBC QN AUTO: 32.6 PG (ref 26–34)
MCHC RBC AUTO-ENTMCNC: 32.4 G/DL (ref 31–37)
MCV RBC AUTO: 100.5 FL
MONOCYTES # BLD AUTO: 0.46 X10(3) UL (ref 0.1–1)
MONOCYTES NFR BLD AUTO: 11.8 %
NEUTROPHILS # BLD AUTO: 2.79 X10 (3) UL (ref 1.5–7.7)
NEUTROPHILS # BLD AUTO: 2.79 X10(3) UL (ref 1.5–7.7)
NEUTROPHILS NFR BLD AUTO: 71.7 %
OSMOLALITY SERPL CALC.SUM OF ELEC: 289 MOSM/KG (ref 275–295)
PLATELET # BLD AUTO: 78 10(3)UL (ref 150–450)
POTASSIUM SERPL-SCNC: 4 MMOL/L (ref 3.5–5.1)
PROT SERPL-MCNC: 6.5 G/DL (ref 6.4–8.2)
RBC # BLD AUTO: 3.74 X10(6)UL
SODIUM SERPL-SCNC: 139 MMOL/L (ref 136–145)
WBC # BLD AUTO: 3.9 X10(3) UL (ref 4–11)

## 2023-09-11 PROCEDURE — 96375 TX/PRO/DX INJ NEW DRUG ADDON: CPT

## 2023-09-11 PROCEDURE — 96365 THER/PROPH/DIAG IV INF INIT: CPT

## 2023-09-11 PROCEDURE — 85025 COMPLETE CBC W/AUTO DIFF WBC: CPT

## 2023-09-11 PROCEDURE — 86301 IMMUNOASSAY TUMOR CA 19-9: CPT

## 2023-09-11 PROCEDURE — 99215 OFFICE O/P EST HI 40 MIN: CPT | Performed by: INTERNAL MEDICINE

## 2023-09-11 PROCEDURE — 80053 COMPREHEN METABOLIC PANEL: CPT

## 2023-09-11 RX ORDER — FLUOROURACIL 50 MG/ML
2400 INJECTION, SOLUTION INTRAVENOUS CONTINUOUS
Status: CANCELLED | OUTPATIENT
Start: 2023-09-11

## 2023-09-11 RX ORDER — FLUOROURACIL 50 MG/ML
2400 INJECTION, SOLUTION INTRAVENOUS CONTINUOUS
Status: DISCONTINUED | OUTPATIENT
Start: 2023-09-11 | End: 2023-09-11

## 2023-09-11 RX ADMIN — FLUOROURACIL 5400 MG: 50 INJECTION, SOLUTION INTRAVENOUS at 12:15:00

## 2023-09-11 NOTE — PROGRESS NOTES
Pt here for C8 D1 Trazimera/Leucovorin/5FU CADD pump. Arrives Ambulating independently, accompanied by Self       Oral medications included in this regimen:  no    Patient confirms comprehension of cancer treatment schedule:  yes    Pregnancy screening:  Not applicable    Modifications in dose or schedule:  No    Medications appearance and physical integrity checked by RN. Chemotherapy IV pump settings verified by 2 RNs:  yes     Frequency of blood return and site check throughout administration: Prior to administration, Prior to each drug, and At completion of therapy     Infusion/treatment outcome:  patient tolerated treatment without incident    Education Record    Learner:  Patient  Barriers / Limitations:  None  Method:  Discussion  Education / instructions given:  Reviewed plan of care and follow up appts.   Outcome:  Shows understanding    Discharged Home, Ambulating independently, accompanied by:Self    Patient/family verbalized understanding of future appointments: by [facility]

## 2023-09-11 NOTE — PROGRESS NOTES
Outpatient Oncology Care Plan   Problem list:   fatigue   Problems related to:   side effect of treatment   Interventions:   provided general teaching   Expected outcomes:   understands plan of care   Progress towards outcome: making progress     Education Record   Learner: Patient   Barriers / Limitations: None   Method: Discussion   Outcome: Shows understanding   Comments:  Patient here for follow-up and planned C8D1 treatment. Still experiencing diarrhea. Believes it is diet related. Energy levels are still poor. Bruise to chest somewhat healing, but still sore. Reminded patient to have echo performed.

## 2023-09-25 ENCOUNTER — OFFICE VISIT (OUTPATIENT)
Dept: HEMATOLOGY/ONCOLOGY | Age: 53
End: 2023-09-25
Attending: INTERNAL MEDICINE
Payer: COMMERCIAL

## 2023-09-25 ENCOUNTER — HOSPITAL ENCOUNTER (OUTPATIENT)
Dept: CT IMAGING | Age: 53
Discharge: HOME | End: 2023-09-25
Attending: INTERNAL MEDICINE
Payer: COMMERCIAL

## 2023-09-25 VITALS
HEIGHT: 73.82 IN | RESPIRATION RATE: 16 BRPM | WEIGHT: 206.5 LBS | SYSTOLIC BLOOD PRESSURE: 158 MMHG | HEART RATE: 82 BPM | TEMPERATURE: 97 F | BODY MASS INDEX: 26.5 KG/M2 | DIASTOLIC BLOOD PRESSURE: 83 MMHG | OXYGEN SATURATION: 99 %

## 2023-09-25 DIAGNOSIS — C15.5 MALIGNANT NEOPLASM OF LOWER THIRD OF ESOPHAGUS (HCC): Primary | ICD-10-CM

## 2023-09-25 DIAGNOSIS — C15.5 MALIGNANT NEOPLASM OF LOWER THIRD OF ESOPHAGUS (HCC): ICD-10-CM

## 2023-09-25 LAB
ALBUMIN SERPL-MCNC: 3.3 G/DL (ref 3.4–5)
ALBUMIN/GLOB SERPL: 0.9 {RATIO} (ref 1–2)
ALP LIVER SERPL-CCNC: 118 U/L
ALT SERPL-CCNC: 34 U/L
ANION GAP SERPL CALC-SCNC: 3 MMOL/L (ref 0–18)
AST SERPL-CCNC: 29 U/L (ref 15–37)
BASOPHILS # BLD AUTO: 0.02 X10(3) UL (ref 0–0.2)
BASOPHILS NFR BLD AUTO: 0.6 %
BILIRUB SERPL-MCNC: 0.5 MG/DL (ref 0.1–2)
BUN BLD-MCNC: 11 MG/DL (ref 7–18)
CALCIUM BLD-MCNC: 8.8 MG/DL (ref 8.5–10.1)
CANCER AG19-9 SERPL-ACNC: 10.3 U/ML (ref ?–37)
CHLORIDE SERPL-SCNC: 108 MMOL/L (ref 98–112)
CO2 SERPL-SCNC: 27 MMOL/L (ref 21–32)
CREAT BLD-MCNC: 0.91 MG/DL
EGFRCR SERPLBLD CKD-EPI 2021: 101 ML/MIN/1.73M2 (ref 60–?)
EOSINOPHIL # BLD AUTO: 0.11 X10(3) UL (ref 0–0.7)
EOSINOPHIL NFR BLD AUTO: 3.5 %
ERYTHROCYTE [DISTWIDTH] IN BLOOD BY AUTOMATED COUNT: 15.3 %
GLOBULIN PLAS-MCNC: 3.6 G/DL (ref 2.8–4.4)
GLUCOSE BLD-MCNC: 147 MG/DL (ref 70–99)
HCT VFR BLD AUTO: 38.3 %
HGB BLD-MCNC: 12.4 G/DL
IMM GRANULOCYTES # BLD AUTO: 0.01 X10(3) UL (ref 0–1)
IMM GRANULOCYTES NFR BLD: 0.3 %
LYMPHOCYTES # BLD AUTO: 0.6 X10(3) UL (ref 1–4)
LYMPHOCYTES NFR BLD AUTO: 19.1 %
MCH RBC QN AUTO: 32.5 PG (ref 26–34)
MCHC RBC AUTO-ENTMCNC: 32.4 G/DL (ref 31–37)
MCV RBC AUTO: 100.3 FL
MONOCYTES # BLD AUTO: 0.43 X10(3) UL (ref 0.1–1)
MONOCYTES NFR BLD AUTO: 13.7 %
NEUTROPHILS # BLD AUTO: 1.97 X10 (3) UL (ref 1.5–7.7)
NEUTROPHILS # BLD AUTO: 1.97 X10(3) UL (ref 1.5–7.7)
NEUTROPHILS NFR BLD AUTO: 62.8 %
OSMOLALITY SERPL CALC.SUM OF ELEC: 288 MOSM/KG (ref 275–295)
PLATELET # BLD AUTO: 102 10(3)UL (ref 150–450)
POTASSIUM SERPL-SCNC: 4.2 MMOL/L (ref 3.5–5.1)
PROT SERPL-MCNC: 6.9 G/DL (ref 6.4–8.2)
RBC # BLD AUTO: 3.82 X10(6)UL
SODIUM SERPL-SCNC: 138 MMOL/L (ref 136–145)
WBC # BLD AUTO: 3.1 X10(3) UL (ref 4–11)

## 2023-09-25 PROCEDURE — 99215 OFFICE O/P EST HI 40 MIN: CPT | Performed by: INTERNAL MEDICINE

## 2023-09-25 PROCEDURE — 80053 COMPREHEN METABOLIC PANEL: CPT

## 2023-09-25 PROCEDURE — 71260 CT THORAX DX C+: CPT | Performed by: INTERNAL MEDICINE

## 2023-09-25 PROCEDURE — 85025 COMPLETE CBC W/AUTO DIFF WBC: CPT

## 2023-09-25 PROCEDURE — 86301 IMMUNOASSAY TUMOR CA 19-9: CPT

## 2023-09-25 PROCEDURE — 96413 CHEMO IV INFUSION 1 HR: CPT

## 2023-09-25 NOTE — PROGRESS NOTES
Outpatient Oncology Care Plan   Problem list:   fatigue   Problems related to:   side effect of treatment   Interventions:   provided general teaching   Expected outcomes:   understands plan of care   Progress towards outcome: making progress     Education Record   Learner: Patient   Barriers / Limitations: None   Method: Discussion   Outcome: Shows understanding   Comments:  Patient here for follow-up and planned C9D1 treatment. Still having neuropathy to both hands. Will have his CT scan later today. Noticeable sternal pain that remains unchanged. More bruising to legs and abdomen. Had some diarrhea this past week. Believes it is diet related.

## 2023-09-25 NOTE — PROGRESS NOTES
Nutrition F/U Consultation     Patient Name: Evie Segundo. YOB: 1970  Medical Record Number: [id number]      Account Number: [de-identified]  Dietitian: Mario Michelle RD, LDN     Date of visit: 08/28/2023     Diet Rx: po as tolerated (post-esophagectomy)     Pertinent Dx/PMH: Esophageal cancer; pancreatic (head) cancer     TX:  q2 week 5-FU + Herceptin + q6 week Keytruda. Oxaliplatin was held on C7 due to neuropathy      1. Neoadjuvant chemoRT with carbo-taxol: 7/6/22-8/10/22  2. laparoscopic robotic-assisted esophagogastrectomy with feeding jejunostomy tube placement on 9/30/2022. Complications w/ esophageal leak  3. G-POEM (EGD and pylorectomy) 12/27/22     Other pertinent subjective/objective information: diet/wt/medical hx    9/25/23: Pt meeting definition for SEVERE MALNUTRITION in the context of acute illness as evidenced by 6% unplanned wt loss x 4 weeks and visible signs of lean and fat tissue loss. Pertinent Meds: noted     Pertinent Labs: noted     Height: 6'2\"                IBW: 190 +/- 10%     WT HX:   09/25/23: 206 lbs 8 oz  08/28/23: 219 lbs  08/07/23: 222 lbs  07/24/23: 226 lbs 8 oz  07/10/23: 231 lbs  06/05/23: 234 lbs  05/01/23: 244 lbs  04/03/23: 251 lbs  03/06/23: 246 lbs  02/20/23: 244 lbs  02/06/23: 239 lbs 8 oz     Estimated Nutrition Needs: 22-25  kcals/kg = 5415-1864 KCALS/d; 1.3 gms protein/kg = 127 gms/d     Assessment/Plan: RD f/u w/ pt in tx room today. Pt shared w/ RD good results of signatera testing. Pt feels he continues to eat well and is unsure as to reason behind ongoing wt loss. Pt noted some diarrhea but r/t consuming [employer] meal.     As per oncologist agreement, RD discussed potential of pancreatic enzyme defy and provided pt w/ collection supplies for stool testing for fecal elastase. RD also encouraged pt to maintain ongoing food log either by hand or via smart phone evaristo. Pt verbalized understanding.  RD offered support/encouragement and will continue to follow. The [facility] makes medical notes like these available to patients in the interest of transparency. Please be advised this is a medical document. Medical documents are intended to carry relevant information, facts as evident, and the clinical opinion of the practitioner. The medical note is intended as peer to peer communication and may appear blunt or direct. It is written in medical language and may contain abbreviations or verbiage that are unfamiliar.

## 2023-10-08 ENCOUNTER — LAB ENCOUNTER (OUTPATIENT)
Dept: LAB | Age: 53
End: 2023-10-08
Attending: INTERNAL MEDICINE
Payer: COMMERCIAL

## 2023-10-09 ENCOUNTER — OFFICE VISIT (OUTPATIENT)
Dept: HEMATOLOGY/ONCOLOGY | Age: 53
End: 2023-10-09
Attending: INTERNAL MEDICINE
Payer: COMMERCIAL

## 2023-10-09 ENCOUNTER — EKG ENCOUNTER (OUTPATIENT)
Dept: LAB | Age: 53
End: 2023-10-09
Attending: INTERNAL MEDICINE
Payer: COMMERCIAL

## 2023-10-09 VITALS
DIASTOLIC BLOOD PRESSURE: 78 MMHG | OXYGEN SATURATION: 100 % | HEART RATE: 53 BPM | BODY MASS INDEX: 26.5 KG/M2 | SYSTOLIC BLOOD PRESSURE: 154 MMHG | HEIGHT: 73.82 IN | TEMPERATURE: 97 F | WEIGHT: 206.5 LBS | RESPIRATION RATE: 16 BRPM

## 2023-10-09 DIAGNOSIS — C15.5 MALIGNANT NEOPLASM OF LOWER THIRD OF ESOPHAGUS (HCC): ICD-10-CM

## 2023-10-09 DIAGNOSIS — C15.5 MALIGNANT NEOPLASM OF LOWER THIRD OF ESOPHAGUS (HCC): Primary | ICD-10-CM

## 2023-10-09 DIAGNOSIS — Z51.12 ENCOUNTER FOR ANTINEOPLASTIC IMMUNOTHERAPY: ICD-10-CM

## 2023-10-09 DIAGNOSIS — Z51.12 ENCOUNTER FOR ANTINEOPLASTIC IMMUNOTHERAPY: Primary | ICD-10-CM

## 2023-10-09 LAB
ALBUMIN SERPL-MCNC: 3.5 G/DL (ref 3.4–5)
ALBUMIN/GLOB SERPL: 1.1 {RATIO} (ref 1–2)
ALP LIVER SERPL-CCNC: 116 U/L
ALT SERPL-CCNC: 33 U/L
ANION GAP SERPL CALC-SCNC: 4 MMOL/L (ref 0–18)
AST SERPL-CCNC: 29 U/L (ref 15–37)
ATRIAL RATE: 55 BPM
BASOPHILS # BLD AUTO: 0.02 X10(3) UL (ref 0–0.2)
BASOPHILS NFR BLD AUTO: 0.6 %
BILIRUB SERPL-MCNC: 0.5 MG/DL (ref 0.1–2)
BUN BLD-MCNC: 11 MG/DL (ref 7–18)
CALCIUM BLD-MCNC: 8.9 MG/DL (ref 8.5–10.1)
CANCER AG19-9 SERPL-ACNC: 15 U/ML (ref ?–37)
CHLORIDE SERPL-SCNC: 108 MMOL/L (ref 98–112)
CO2 SERPL-SCNC: 27 MMOL/L (ref 21–32)
CREAT BLD-MCNC: 0.69 MG/DL
EGFRCR SERPLBLD CKD-EPI 2021: 111 ML/MIN/1.73M2 (ref 60–?)
EOSINOPHIL # BLD AUTO: 0.07 X10(3) UL (ref 0–0.7)
EOSINOPHIL NFR BLD AUTO: 2.1 %
ERYTHROCYTE [DISTWIDTH] IN BLOOD BY AUTOMATED COUNT: 14.6 %
GLOBULIN PLAS-MCNC: 3.1 G/DL (ref 2.8–4.4)
GLUCOSE BLD-MCNC: 102 MG/DL (ref 70–99)
HCT VFR BLD AUTO: 37.2 %
HGB BLD-MCNC: 12.2 G/DL
IMM GRANULOCYTES # BLD AUTO: 0.02 X10(3) UL (ref 0–1)
IMM GRANULOCYTES NFR BLD: 0.6 %
LYMPHOCYTES # BLD AUTO: 0.63 X10(3) UL (ref 1–4)
LYMPHOCYTES NFR BLD AUTO: 19 %
MCH RBC QN AUTO: 32.6 PG (ref 26–34)
MCHC RBC AUTO-ENTMCNC: 32.8 G/DL (ref 31–37)
MCV RBC AUTO: 99.5 FL
MONOCYTES # BLD AUTO: 0.5 X10(3) UL (ref 0.1–1)
MONOCYTES NFR BLD AUTO: 15.1 %
NEUTROPHILS # BLD AUTO: 2.08 X10 (3) UL (ref 1.5–7.7)
NEUTROPHILS # BLD AUTO: 2.08 X10(3) UL (ref 1.5–7.7)
NEUTROPHILS NFR BLD AUTO: 62.6 %
OSMOLALITY SERPL CALC.SUM OF ELEC: 288 MOSM/KG (ref 275–295)
P AXIS: 10 DEGREES
P-R INTERVAL: 168 MS
PLATELET # BLD AUTO: 100 10(3)UL (ref 150–450)
POTASSIUM SERPL-SCNC: 4 MMOL/L (ref 3.5–5.1)
PROT SERPL-MCNC: 6.6 G/DL (ref 6.4–8.2)
Q-T INTERVAL: 422 MS
QRS DURATION: 98 MS
QTC CALCULATION (BEZET): 403 MS
R AXIS: 12 DEGREES
RBC # BLD AUTO: 3.74 X10(6)UL
SODIUM SERPL-SCNC: 139 MMOL/L (ref 136–145)
T AXIS: 57 DEGREES
TROPONIN I HIGH SENSITIVITY: 6 NG/L
VENTRICULAR RATE: 55 BPM
WBC # BLD AUTO: 3.3 X10(3) UL (ref 4–11)

## 2023-10-09 PROCEDURE — 96413 CHEMO IV INFUSION 1 HR: CPT

## 2023-10-09 PROCEDURE — 99215 OFFICE O/P EST HI 40 MIN: CPT | Performed by: INTERNAL MEDICINE

## 2023-10-09 PROCEDURE — 96417 CHEMO IV INFUS EACH ADDL SEQ: CPT

## 2023-10-09 PROCEDURE — 80053 COMPREHEN METABOLIC PANEL: CPT

## 2023-10-09 PROCEDURE — 86301 IMMUNOASSAY TUMOR CA 19-9: CPT

## 2023-10-09 PROCEDURE — 84484 ASSAY OF TROPONIN QUANT: CPT

## 2023-10-09 PROCEDURE — 85025 COMPLETE CBC W/AUTO DIFF WBC: CPT

## 2023-10-09 RX ORDER — BACLOFEN 10 MG/1
10 TABLET ORAL 3 TIMES DAILY
Qty: 30 TABLET | Refills: 0 | Status: SHIPPED | OUTPATIENT
Start: 2023-10-09

## 2023-10-09 NOTE — PROGRESS NOTES
Outpatient Oncology Care Plan   Problem list:   fatigue   Problems related to:   side effect of treatment   Interventions:   provided general teaching   Expected outcomes:   understands plan of care   Progress towards outcome: making progress     Education Record   Learner: Patient   Barriers / Limitations: None   Method: Discussion   Outcome: Shows understanding   Comments:  Patient here for follow-up and planned C10D1 treatment. Neuropathy remains unchanged. Still having two episodes of diarrhea per day. Will have his echo on 10/19/23. Having noticeable left elbow pain. Still having sternal pain and lightheadedness as well.

## 2023-10-09 NOTE — PROGRESS NOTES
Nutrition F/U Consultation     Patient Name: Jamey Lopez. YOB: 1970  Medical Record Number: [id number]      Account Number: [de-identified]  Dietitian: Myriam Peres RD, RADHA     Date of visit: 10/09/2023     Diet Rx: po as tolerated (post-esophagectomy)     Pertinent Dx/PMH: Esophageal cancer; pancreatic (head) cancer     TX:  Switched to Herceptin/Immunotherapy maintenance on 9/25/23 due to negative signatera; q2 week 5-FU + Herceptin + q6 week Keytruda. Oxaliplatin was held on C7 due to neuropathy      1. Neoadjuvant chemoRT with carbo-taxol: 7/6/22-8/10/22  2. laparoscopic robotic-assisted esophagogastrectomy with feeding jejunostomy tube placement on 9/30/2022. Complications w/ esophageal leak  3. G-POEM (EGD and pylorectomy) 12/27/22     Other pertinent subjective/objective information: diet/wt/medical hx     9/25/23: Pt meeting definition for SEVERE MALNUTRITION in the context of acute illness as evidenced by 6% unplanned wt loss x 4 weeks and visible signs of lean and fat tissue loss. Pertinent Meds: noted     Pertinent Labs: noted     Height: 6'2\"                IBW: 190 +/- 10%     WT HX:   10/09/23: 206 lbs 8 oz  09/25/23: 206 lbs 8 oz  08/28/23: 219 lbs  08/07/23: 222 lbs  07/24/23: 226 lbs 8 oz  07/10/23: 231 lbs  06/05/23: 234 lbs  05/01/23: 244 lbs  04/03/23: 251 lbs  03/06/23: 246 lbs  02/20/23: 244 lbs  02/06/23: 239 lbs 8 oz     Estimated Nutrition Needs: 22-25  kcals/kg = 3869-1055 KCALS/d; 1.3 gms protein/kg = 127 gms/d     Assessment/Plan: RD f/u w/ pt and spouse in tx room today. Wt has stabilized. Pt continues experimenting w/ a variety of foods. RD observed pt eating popcorn and rice krispie treat this morning, Pt noted tolerating entire [employer] roast beef and cheddar the other day. Pt reporting continued watery diarrhea. Fecal elastase results pending. RD inquired whether pt remembers if stool is the same as when he had C. Diff; pt felt is is the same.      RD offered support/encouragement and will continue to follow. RD alerted oncologist to above. C.Diff testing ordered. The [facility] makes medical notes like these available to patients in the interest of transparency. Please be advised this is a medical document. Medical documents are intended to carry relevant information, facts as evident, and the clinical opinion of the practitioner. The medical note is intended as peer to peer communication and may appear blunt or direct. It is written in medical language and may contain abbreviations or verbiage that are unfamiliar.

## 2023-10-09 NOTE — PROGRESS NOTES
Pt here for C10D1. Pt seen by MD today. Arrives Ambulating independently, accompanied by Spouse        Oral medications included in this regimen:  no     Patient confirms comprehension of cancer treatment schedule:  yes     Pregnancy screening:  Not applicable     Modifications in dose or schedule:  no  Medications appearance and physical integrity checked by RN. Chemotherapy IV pump settings verified by 2 RNs:  yes      Frequency of blood return and site check throughout administration: Prior to administration and At completion of therapy      Infusion/treatment outcome:  patient tolerated treatment without incident     Education Record     Learner:  Patient  Barriers / Limitations:  None  Method:  Discussion  Education / instructions given:  yes  Outcome:  Shows understanding     Discharged Home, Ambulating independently, accompanied by:Spouse     Patient/family verbalized understanding of future appointments: by [employer]  Pt dc ambulatory in stable condition to outpatient registration for EKG- order given to pt.

## 2023-10-10 LAB
ATRIAL RATE: 55 BPM
P AXIS: 10 DEGREES
P-R INTERVAL: 168 MS
Q-T INTERVAL: 422 MS
QRS DURATION: 98 MS
QTC CALCULATION (BEZET): 403 MS
R AXIS: 12 DEGREES
T AXIS: 57 DEGREES
VENTRICULAR RATE: 55 BPM

## 2023-10-16 RX ORDER — PANCRELIPASE 24000; 76000; 120000 [USP'U]/1; [USP'U]/1; [USP'U]/1
1 CAPSULE, DELAYED RELEASE PELLETS ORAL
Qty: 90 CAPSULE | Refills: 0 | Status: SHIPPED | OUTPATIENT
Start: 2023-10-16 | End: 2023-11-15

## 2023-10-17 ENCOUNTER — SOCIAL WORK SERVICES (OUTPATIENT)
Dept: HEMATOLOGY/ONCOLOGY | Facility: HOSPITAL | Age: 53
End: 2023-10-17

## 2023-10-17 NOTE — PROGRESS NOTES
MARIZA received a request from [employer] for short term disability. MARIZA faxed completed form and medical records from 8/1/2023 per request.  Faxed to [facility] fax [phone]. MARIZA sent a copy of the confirmation and completed forms to pt via [employer] message. Odalis Dao LCSW   at [employer] [address], [address], [facility], [address] [place]   [place] Joe [place] [address] [place] [address] Joe Perdomo  Ph: [phone]. [email or web address]  Fax: [phone]

## 2023-10-17 NOTE — PROGRESS NOTES
Outpatient Oncology Care Plan   Problem list:   fatigue   Problems related to:   side effect of treatment   Interventions:   provided general teaching   Expected outcomes:   understands plan of care   Progress towards outcome: making progress     Education Record   Learner: Patient   Barriers / Limitations: None   Method: Discussion   Outcome: Shows understanding   Comments:  Patient here for follow-up and planned C4D1 treatment. States he had some diarrhea but believes it is related to diet. Denies any additional changes since last visit. accepting

## 2023-10-19 ENCOUNTER — HOSPITAL ENCOUNTER (OUTPATIENT)
Dept: CV DIAGNOSTICS | Age: 53
Discharge: HOME | End: 2023-10-19
Attending: INTERNAL MEDICINE
Payer: COMMERCIAL

## 2023-10-19 DIAGNOSIS — C15.5 MALIGNANT NEOPLASM OF LOWER THIRD OF ESOPHAGUS (HCC): ICD-10-CM

## 2023-10-19 PROCEDURE — 93306 TTE W/DOPPLER COMPLETE: CPT | Performed by: INTERNAL MEDICINE

## 2023-10-24 DIAGNOSIS — I10 ESSENTIAL HYPERTENSION: ICD-10-CM

## 2023-10-24 RX ORDER — METOPROLOL SUCCINATE 50 MG/1
TABLET, EXTENDED RELEASE ORAL
Qty: 90 TABLET | Refills: 1 | Status: SHIPPED | OUTPATIENT
Start: 2023-10-24

## 2023-10-30 ENCOUNTER — OFFICE VISIT (OUTPATIENT)
Dept: HEMATOLOGY/ONCOLOGY | Age: 53
End: 2023-10-30
Attending: INTERNAL MEDICINE

## 2023-10-30 ENCOUNTER — TELEPHONE (OUTPATIENT)
Dept: HEMATOLOGY/ONCOLOGY | Facility: HOSPITAL | Age: 53
End: 2023-10-30

## 2023-10-30 VITALS
OXYGEN SATURATION: 99 % | HEART RATE: 56 BPM | TEMPERATURE: 98 F | SYSTOLIC BLOOD PRESSURE: 165 MMHG | DIASTOLIC BLOOD PRESSURE: 87 MMHG | BODY MASS INDEX: 26 KG/M2 | RESPIRATION RATE: 16 BRPM | WEIGHT: 202.5 LBS

## 2023-10-30 DIAGNOSIS — C15.5 MALIGNANT NEOPLASM OF LOWER THIRD OF ESOPHAGUS (HCC): Primary | ICD-10-CM

## 2023-10-30 LAB
ALBUMIN SERPL-MCNC: 3.2 G/DL (ref 3.4–5)
ALBUMIN/GLOB SERPL: 0.9 {RATIO} (ref 1–2)
ALP LIVER SERPL-CCNC: 112 U/L
ALT SERPL-CCNC: 26 U/L
ANION GAP SERPL CALC-SCNC: 4 MMOL/L (ref 0–18)
AST SERPL-CCNC: 20 U/L (ref 15–37)
BASOPHILS # BLD AUTO: 0.02 X10(3) UL (ref 0–0.2)
BASOPHILS NFR BLD AUTO: 0.3 %
BILIRUB SERPL-MCNC: 0.5 MG/DL (ref 0.1–2)
BUN BLD-MCNC: 10 MG/DL (ref 7–18)
CALCIUM BLD-MCNC: 8.6 MG/DL (ref 8.5–10.1)
CANCER AG19-9 SERPL-ACNC: 18.1 U/ML (ref ?–37)
CHLORIDE SERPL-SCNC: 109 MMOL/L (ref 98–112)
CO2 SERPL-SCNC: 27 MMOL/L (ref 21–32)
CREAT BLD-MCNC: 0.64 MG/DL
EGFRCR SERPLBLD CKD-EPI 2021: 112 ML/MIN/1.73M2 (ref 60–?)
EOSINOPHIL # BLD AUTO: 0.1 X10(3) UL (ref 0–0.7)
EOSINOPHIL NFR BLD AUTO: 1.4 %
ERYTHROCYTE [DISTWIDTH] IN BLOOD BY AUTOMATED COUNT: 13.4 %
FASTING STATUS PATIENT QL REPORTED: NO
GLOBULIN PLAS-MCNC: 3.7 G/DL (ref 2.8–4.4)
GLUCOSE BLD-MCNC: 111 MG/DL (ref 70–99)
HCT VFR BLD AUTO: 36.6 %
HGB BLD-MCNC: 12.2 G/DL
IMM GRANULOCYTES # BLD AUTO: 0.03 X10(3) UL (ref 0–1)
IMM GRANULOCYTES NFR BLD: 0.4 %
LYMPHOCYTES # BLD AUTO: 0.62 X10(3) UL (ref 1–4)
LYMPHOCYTES NFR BLD AUTO: 8.9 %
MCH RBC QN AUTO: 32.7 PG (ref 26–34)
MCHC RBC AUTO-ENTMCNC: 33.3 G/DL (ref 31–37)
MCV RBC AUTO: 98.1 FL
MONOCYTES # BLD AUTO: 0.58 X10(3) UL (ref 0.1–1)
MONOCYTES NFR BLD AUTO: 8.3 %
NEUTROPHILS # BLD AUTO: 5.64 X10 (3) UL (ref 1.5–7.7)
NEUTROPHILS # BLD AUTO: 5.64 X10(3) UL (ref 1.5–7.7)
NEUTROPHILS NFR BLD AUTO: 80.7 %
OSMOLALITY SERPL CALC.SUM OF ELEC: 290 MOSM/KG (ref 275–295)
PLATELET # BLD AUTO: 155 10(3)UL (ref 150–450)
POTASSIUM SERPL-SCNC: 3.8 MMOL/L (ref 3.5–5.1)
PROT SERPL-MCNC: 6.9 G/DL (ref 6.4–8.2)
RBC # BLD AUTO: 3.73 X10(6)UL
SODIUM SERPL-SCNC: 140 MMOL/L (ref 136–145)
WBC # BLD AUTO: 7 X10(3) UL (ref 4–11)

## 2023-10-30 PROCEDURE — 86301 IMMUNOASSAY TUMOR CA 19-9: CPT

## 2023-10-30 PROCEDURE — 99215 OFFICE O/P EST HI 40 MIN: CPT | Performed by: INTERNAL MEDICINE

## 2023-10-30 PROCEDURE — 85025 COMPLETE CBC W/AUTO DIFF WBC: CPT

## 2023-10-30 PROCEDURE — 80053 COMPREHEN METABOLIC PANEL: CPT

## 2023-10-30 PROCEDURE — 36591 DRAW BLOOD OFF VENOUS DEVICE: CPT

## 2023-10-30 RX ORDER — BACLOFEN 10 MG/1
10 TABLET ORAL 3 TIMES DAILY PRN
Qty: 180 TABLET | Refills: 0 | Status: SHIPPED | OUTPATIENT
Start: 2023-10-30 | End: 2024-01-28

## 2023-10-30 RX ORDER — PANCRELIPASE 24000; 76000; 120000 [USP'U]/1; [USP'U]/1; [USP'U]/1
CAPSULE, DELAYED RELEASE PELLETS ORAL
Qty: 240 CAPSULE | Refills: 0 | Status: SHIPPED | OUTPATIENT
Start: 2023-10-30

## 2023-10-30 RX ORDER — BACLOFEN 10 MG/1
10 TABLET ORAL 3 TIMES DAILY PRN
Qty: 180 TABLET | Refills: 0 | Status: SHIPPED | OUTPATIENT
Start: 2023-10-30 | End: 2023-10-30

## 2023-10-30 NOTE — PROGRESS NOTES
Pt has another appointment today @ 10am, pt states he is unable to stay for infusion, would like to reschedule for tomorrow. MD aware and appt changed.

## 2023-10-30 NOTE — PROGRESS NOTES
BRIEF ONCOLOGY NUTRITION F/U NOTE:   Pt did not receive tx today as needed to head home for another appointment. RD will attempt to contact pt via phone to discuss PERT use and coverage. Written materials left at  for pt pick-up.

## 2023-10-30 NOTE — PROGRESS NOTES
Outpatient Oncology Care Plan   Problem list:   fatigue   Problems related to:   side effect of treatment   Interventions:   provided general teaching   Expected outcomes:   understands plan of care   Progress towards outcome: making progress     Education Record   Learner: Patient   Barriers / Limitations: None   Method: Discussion   Outcome: Shows understanding   Comments:  Patient here for follow-up and planned C11D1 treatment. States he is still having some diarrhea (1-2 episodes per day). Energy levels are still somewhat low. Neuropathy remains unchanged. Had recent echo performed.

## 2023-10-31 ENCOUNTER — OFFICE VISIT (OUTPATIENT)
Dept: HEMATOLOGY/ONCOLOGY | Age: 53
End: 2023-10-31
Attending: INTERNAL MEDICINE

## 2023-10-31 VITALS
BODY MASS INDEX: 26.24 KG/M2 | RESPIRATION RATE: 18 BRPM | TEMPERATURE: 98 F | HEIGHT: 73.82 IN | SYSTOLIC BLOOD PRESSURE: 172 MMHG | DIASTOLIC BLOOD PRESSURE: 82 MMHG | HEART RATE: 58 BPM | WEIGHT: 204.5 LBS

## 2023-10-31 DIAGNOSIS — C15.5 MALIGNANT NEOPLASM OF LOWER THIRD OF ESOPHAGUS (HCC): Primary | ICD-10-CM

## 2023-10-31 PROCEDURE — 96413 CHEMO IV INFUSION 1 HR: CPT

## 2023-10-31 NOTE — PROGRESS NOTES
Pt here for C11D1 Drug(s)trazimera/keytruda. Arrives Ambulating independently, accompanied by Spouse     Patient was evaluated today by Treatment Nurse. Oral medications included in this regimen:  no    Patient confirms comprehension of cancer treatment schedule:  yes    Pregnancy screening:  Not applicable    Modifications in dose or schedule:  No    Medications appearance and physical integrity checked by RN: yes. Chemotherapy IV pump settings verified by 2 RNs:  No due to targeted therapy IV administration.   Frequency of blood return and site check throughout administration: Prior to administration, Prior to each drug, and At completion of therapy     Infusion/treatment outcome:  patient tolerated treatment without incident    Education Record    Learner:  Patient  Barriers / Limitations:  None  Method:  Brief focused and Discussion  Education / instructions given:  schedule  Outcome:  Shows understanding    Discharged Home, Ambulating independently, accompanied by:Spouse    Patient/family verbalized understanding of future appointments: by philly [place]

## 2023-11-06 ENCOUNTER — HOSPITAL ENCOUNTER (INPATIENT)
Facility: HOSPITAL | Age: 53
LOS: 5 days | Discharge: HOME | End: 2023-11-11
Attending: EMERGENCY MEDICINE | Admitting: HOSPITALIST
Payer: COMMERCIAL

## 2023-11-06 ENCOUNTER — APPOINTMENT (OUTPATIENT)
Dept: GENERAL RADIOLOGY | Facility: HOSPITAL | Age: 53
End: 2023-11-06
Attending: EMERGENCY MEDICINE
Payer: COMMERCIAL

## 2023-11-06 ENCOUNTER — TELEPHONE (OUTPATIENT)
Dept: HEMATOLOGY/ONCOLOGY | Facility: HOSPITAL | Age: 53
End: 2023-11-06

## 2023-11-06 DIAGNOSIS — D64.9 NORMOCYTIC ANEMIA: ICD-10-CM

## 2023-11-06 DIAGNOSIS — J69.0 ASPIRATION PNEUMONITIS (HCC): Primary | ICD-10-CM

## 2023-11-06 DIAGNOSIS — C15.9 MALIGNANT NEOPLASM OF ESOPHAGUS, UNSPECIFIED LOCATION (HCC): ICD-10-CM

## 2023-11-06 LAB
ADENOVIRUS PCR:: NOT DETECTED
ALBUMIN SERPL-MCNC: 3.2 G/DL (ref 3.4–5)
ALBUMIN/GLOB SERPL: 0.9 {RATIO} (ref 1–2)
ALP LIVER SERPL-CCNC: 97 U/L
ALT SERPL-CCNC: 18 U/L
ANION GAP SERPL CALC-SCNC: 6 MMOL/L (ref 0–18)
AST SERPL-CCNC: 18 U/L (ref 15–37)
B PARAPERT DNA SPEC QL NAA+PROBE: NOT DETECTED
B PERT DNA SPEC QL NAA+PROBE: NOT DETECTED
BASOPHILS # BLD AUTO: 0.03 X10(3) UL (ref 0–0.2)
BASOPHILS NFR BLD AUTO: 0.4 %
BILIRUB SERPL-MCNC: 0.6 MG/DL (ref 0.1–2)
BUN BLD-MCNC: 9 MG/DL (ref 9–23)
C PNEUM DNA SPEC QL NAA+PROBE: NOT DETECTED
CALCIUM BLD-MCNC: 9.3 MG/DL (ref 8.5–10.1)
CHLORIDE SERPL-SCNC: 109 MMOL/L (ref 98–112)
CO2 SERPL-SCNC: 26 MMOL/L (ref 21–32)
CORONAVIRUS 229E PCR:: NOT DETECTED
CORONAVIRUS HKU1 PCR:: NOT DETECTED
CORONAVIRUS NL63 PCR:: NOT DETECTED
CORONAVIRUS OC43 PCR:: NOT DETECTED
CREAT BLD-MCNC: 0.7 MG/DL
EGFRCR SERPLBLD CKD-EPI 2021: 109 ML/MIN/1.73M2 (ref 60–?)
EOSINOPHIL # BLD AUTO: 0.13 X10(3) UL (ref 0–0.7)
EOSINOPHIL NFR BLD AUTO: 1.7 %
ERYTHROCYTE [DISTWIDTH] IN BLOOD BY AUTOMATED COUNT: 13 %
FLUAV + FLUBV RNA SPEC NAA+PROBE: NEGATIVE
FLUAV + FLUBV RNA SPEC NAA+PROBE: NEGATIVE
FLUAV RNA SPEC QL NAA+PROBE: NOT DETECTED
FLUBV RNA SPEC QL NAA+PROBE: NOT DETECTED
GLOBULIN PLAS-MCNC: 3.6 G/DL (ref 2.8–4.4)
GLUCOSE BLD-MCNC: 108 MG/DL (ref 70–99)
HCT VFR BLD AUTO: 35.7 %
HGB BLD-MCNC: 11.8 G/DL
IMM GRANULOCYTES # BLD AUTO: 0.05 X10(3) UL (ref 0–1)
IMM GRANULOCYTES NFR BLD: 0.7 %
LYMPHOCYTES # BLD AUTO: 0.69 X10(3) UL (ref 1–4)
LYMPHOCYTES NFR BLD AUTO: 9.2 %
MCH RBC QN AUTO: 31.8 PG (ref 26–34)
MCHC RBC AUTO-ENTMCNC: 33.1 G/DL (ref 31–37)
MCV RBC AUTO: 96.2 FL
METAPNEUMOVIRUS PCR:: NOT DETECTED
MONOCYTES # BLD AUTO: 0.62 X10(3) UL (ref 0.1–1)
MONOCYTES NFR BLD AUTO: 8.3 %
MYCOPLASMA PNEUMONIA PCR:: NOT DETECTED
NEUTROPHILS # BLD AUTO: 5.94 X10 (3) UL (ref 1.5–7.7)
NEUTROPHILS # BLD AUTO: 5.94 X10(3) UL (ref 1.5–7.7)
NEUTROPHILS NFR BLD AUTO: 79.7 %
OSMOLALITY SERPL CALC.SUM OF ELEC: 291 MOSM/KG (ref 275–295)
PARAINFLUENZA 1 PCR:: NOT DETECTED
PARAINFLUENZA 2 PCR:: NOT DETECTED
PARAINFLUENZA 3 PCR:: NOT DETECTED
PARAINFLUENZA 4 PCR:: NOT DETECTED
PLATELET # BLD AUTO: 166 10(3)UL (ref 150–450)
POTASSIUM SERPL-SCNC: 3.4 MMOL/L (ref 3.5–5.1)
PROT SERPL-MCNC: 6.8 G/DL (ref 6.4–8.2)
RBC # BLD AUTO: 3.71 X10(6)UL
RHINOVIRUS/ENTERO PCR:: NOT DETECTED
RSV RNA SPEC NAA+PROBE: NEGATIVE
RSV RNA SPEC QL NAA+PROBE: NOT DETECTED
SARS-COV-2 RNA NPH QL NAA+NON-PROBE: NOT DETECTED
SARS-COV-2 RNA RESP QL NAA+PROBE: NOT DETECTED
SODIUM SERPL-SCNC: 141 MMOL/L (ref 136–145)
WBC # BLD AUTO: 7.5 X10(3) UL (ref 4–11)

## 2023-11-06 PROCEDURE — 71045 X-RAY EXAM CHEST 1 VIEW: CPT | Performed by: EMERGENCY MEDICINE

## 2023-11-06 PROCEDURE — 99223 1ST HOSP IP/OBS HIGH 75: CPT | Performed by: HOSPITALIST

## 2023-11-06 RX ORDER — METOPROLOL SUCCINATE 50 MG/1
50 TABLET, EXTENDED RELEASE ORAL
Status: DISCONTINUED | OUTPATIENT
Start: 2023-11-06 | End: 2023-11-11

## 2023-11-06 RX ORDER — ONDANSETRON 2 MG/ML
4 INJECTION INTRAMUSCULAR; INTRAVENOUS ONCE
Status: COMPLETED | OUTPATIENT
Start: 2023-11-06 | End: 2023-11-06

## 2023-11-06 RX ORDER — ONDANSETRON 2 MG/ML
4 INJECTION INTRAMUSCULAR; INTRAVENOUS EVERY 6 HOURS PRN
Status: DISCONTINUED | OUTPATIENT
Start: 2023-11-06 | End: 2023-11-11

## 2023-11-06 RX ORDER — PROCHLORPERAZINE EDISYLATE 5 MG/ML
5 INJECTION INTRAMUSCULAR; INTRAVENOUS EVERY 8 HOURS PRN
Status: DISCONTINUED | OUTPATIENT
Start: 2023-11-06 | End: 2023-11-11

## 2023-11-06 RX ORDER — MORPHINE SULFATE 4 MG/ML
4 INJECTION, SOLUTION INTRAMUSCULAR; INTRAVENOUS ONCE
Status: COMPLETED | OUTPATIENT
Start: 2023-11-06 | End: 2023-11-06

## 2023-11-06 RX ORDER — PANTOPRAZOLE SODIUM 40 MG/1
40 TABLET, DELAYED RELEASE ORAL
Status: DISCONTINUED | OUTPATIENT
Start: 2023-11-07 | End: 2023-11-07

## 2023-11-06 RX ORDER — ACETAMINOPHEN 500 MG
500 TABLET ORAL EVERY 4 HOURS PRN
Status: DISCONTINUED | OUTPATIENT
Start: 2023-11-06 | End: 2023-11-11

## 2023-11-06 RX ORDER — BACLOFEN 10 MG/1
10 TABLET ORAL 3 TIMES DAILY PRN
Status: DISCONTINUED | OUTPATIENT
Start: 2023-11-06 | End: 2023-11-11

## 2023-11-06 RX ORDER — SODIUM CHLORIDE 9 MG/ML
INJECTION, SOLUTION INTRAVENOUS CONTINUOUS
Status: DISCONTINUED | OUTPATIENT
Start: 2023-11-06 | End: 2023-11-11

## 2023-11-06 RX ORDER — MORPHINE SULFATE 2 MG/ML
2 INJECTION, SOLUTION INTRAMUSCULAR; INTRAVENOUS EVERY 4 HOURS PRN
Status: DISCONTINUED | OUTPATIENT
Start: 2023-11-06 | End: 2023-11-09

## 2023-11-06 RX ORDER — ENOXAPARIN SODIUM 100 MG/ML
40 INJECTION SUBCUTANEOUS DAILY
Status: DISCONTINUED | OUTPATIENT
Start: 2023-11-06 | End: 2023-11-11

## 2023-11-06 NOTE — ED QUICK NOTES
Orders for admission, patient is aware of plan and ready to go upstairs. Any questions, please call ED RN Sera Terrell  at extension [phone]. Vaccinated?   Type of COVID test sent:genexpert  COVID Suspicion level: Low/      Titratable drug(s) infusing:  Rate:    LOC at time of transport:alert    Other pertinent information:    CIWA score=  NIH score=

## 2023-11-06 NOTE — TELEPHONE ENCOUNTER
BRIEF ONCOLOGY NUTRITION F/U PHONE NOTE:   RD f/u w/ pt via phone to discuss PERT use and coverage; however, pt expressed concern w/ \"liquids feeling as if going down wrong pipe and when coughing up having yellow phlegm. \"  Pt noted having apt w/ Dr. Guicho Smith for upper endoscopy on 11/14 but is attempting contact w/ medical oncologist acutely. Pt had another call coming in. RD to alert oncologist and call pt back. ADDENDUM: Per RN, pt will be seen in ER.

## 2023-11-06 NOTE — ED INITIAL ASSESSMENT (HPI)
Spoke with PCP on phone  told to come to ED for breathing problems,   also states has been unable to keep liquids down,  solids are ok

## 2023-11-07 ENCOUNTER — APPOINTMENT (OUTPATIENT)
Dept: GENERAL RADIOLOGY | Facility: HOSPITAL | Age: 53
End: 2023-11-07
Attending: NURSE PRACTITIONER
Payer: COMMERCIAL

## 2023-11-07 ENCOUNTER — ANESTHESIA EVENT (OUTPATIENT)
Dept: ENDOSCOPY | Facility: HOSPITAL | Age: 53
End: 2023-11-07
Payer: COMMERCIAL

## 2023-11-07 PROBLEM — D64.9 NORMOCYTIC ANEMIA: Status: ACTIVE | Noted: 2023-11-07

## 2023-11-07 PROBLEM — D64.9 NORMOCYTIC ANEMIA: Status: ACTIVE | Noted: 2023-01-01

## 2023-11-07 LAB
DEPRECATED HBV CORE AB SER IA-ACNC: 63.6 NG/ML
IRON SATN MFR SERPL: 7 %
IRON SERPL-MCNC: 22 UG/DL
TIBC SERPL-MCNC: 335 UG/DL (ref 240–450)
TRANSFERRIN SERPL-MCNC: 225 MG/DL (ref 200–360)

## 2023-11-07 PROCEDURE — 99254 IP/OBS CNSLTJ NEW/EST MOD 60: CPT | Performed by: INTERNAL MEDICINE

## 2023-11-07 PROCEDURE — 74220 X-RAY XM ESOPHAGUS 1CNTRST: CPT | Performed by: NURSE PRACTITIONER

## 2023-11-07 PROCEDURE — 99232 SBSQ HOSP IP/OBS MODERATE 35: CPT | Performed by: HOSPITALIST

## 2023-11-07 NOTE — PLAN OF CARE
Problem: PAIN - ADULT  Goal: Verbalizes/displays adequate comfort level or patient's stated pain goal  Description: INTERVENTIONS:  - Encourage pt to monitor pain and request assistance  - Assess pain using appropriate pain scale  - Administer analgesics based on type and severity of pain and evaluate response  - Implement non-pharmacological measures as appropriate and evaluate response  - Consider cultural and social influences on pain and pain management  - Manage/alleviate anxiety  - Utilize distraction and/or relaxation techniques  - Monitor for opioid side effects  - Notify MD/LIP if interventions unsuccessful or patient reports new pain  - Anticipate increased pain with activity and pre-medicate as appropriate  Outcome: Not Progressing   Seen by Hospitalist and consulting Md, Medicated for pain,Ivf at reg rates  Via left ann cath w/ good blood return, Went for xray of esophagus single contrast  And tolerated well,Will be npo after midnight for Egd tomorrow under anesthesia with  Dr. Lexus Raines, Consent was signed,Assisted needs.

## 2023-11-07 NOTE — PLAN OF CARE
Patient is alert and oriented, on RA, still coughing after drinking. Respiratory panel (-). Patient complains of abdominal pain, Morphine given for pain. IVF infusing, meds given per [facility]. Possible EGD to r/o esophageal strictures. Patient independent to the bathroom. Safety precautions in place, plan of care ongoing.      Problem: PAIN - ADULT  Goal: Verbalizes/displays adequate comfort level or patient's stated pain goal  Description: INTERVENTIONS:  - Encourage pt to monitor pain and request assistance  - Assess pain using appropriate pain scale  - Administer analgesics based on type and severity of pain and evaluate response  - Implement non-pharmacological measures as appropriate and evaluate response  - Consider cultural and social influences on pain and pain management  - Manage/alleviate anxiety  - Utilize distraction and/or relaxation techniques  - Monitor for opioid side effects  - Notify MD/LIP if interventions unsuccessful or patient reports new pain  - Anticipate increased pain with activity and pre-medicate as appropriate  11/6/2023 2357 by Marline Cardona RN  Outcome: Progressing  11/6/2023 2357 by Marline Cardona RN  Outcome: Progressing

## 2023-11-07 NOTE — PROGRESS NOTES
NURSING ADMISSION NOTE      Patient admitted via Cart  Oriented to room. Safety precautions initiated. Bed in low position. Call light in reach. Admitted patient from Er, A/O x 4 w/  aspiration pneumonitis,Hx of esophageal cancer,Came w/ admitting orders. Ivf started at regulated rates, Navigator was done   And made comfortable in bed. Bandar Stafford

## 2023-11-07 NOTE — SLP NOTE
ADULT SWALLOWING EVALUATION    ASSESSMENT    ASSESSMENT/OVERALL IMPRESSION:  Patient is a 45 y/o male admitted with suspected aspiration and PMHx significant for esophageal cancer, CAD, and HTN. Patient received alert and oriented, eating breakfast at time of my arrival. Patient reported frequent coughing with PO intake of liquid since this past Saturday. He has not experienced s/s of aspiration previously. Patient denied dysphagia symptoms with solid PO intake. Patient presented with suspected pharyngeal dysphagia with likely esophageal involvement. Bolus acceptance was adequate without evidence of anterior bolus loss. Mastication and AP bolus transit were thorough and efficient without evidence of oral residue. Pharyngeal swallow initiation appeared timely and hyolaryngeal excursion was adequate per palpation. Patient exhibiting both immediate and delayed cough following larger boluses of thin and mildly thick liquids. No overt s/s of aspiration observed with solid PO trials. Symptoms improved with limited bolus size. Recommend patient continue a regular diet and thin liquids. Bolus size and rate of intake should be limited. Recommend further evaluation via VFSS to determine extent of pharyngeal involvement vs aspiration caused by reflux/stricture. Education provided and patient agreeable to plan. Further recommendations pending results of VFSS. Addendum 10:41  Discussed with GI APN, patient planned for esophagram given suspicion of esophageal dysphagia over pharyngeal dysphagia. Will discontinue VFSS order at this time, await results of esophagram/GI work-up, and revisit VFSS if indicated later in hospitalization. RECOMMENDATIONS   Diet Recommendations - Solids: Regular  Diet Recommendations - Liquids:  Thin Liquids                        Compensatory Strategies Recommended: Small bites and sips  Aspiration Precautions: Upright position  Medication Administration Recommendations: One pill at a time  Treatment Plan/Recommendations: Videofluoroscopic swallow study       HISTORY   MEDICAL HISTORY  Reason for Referral: R/O aspiration    Problem List  Principal Problem:    Aspiration pneumonitis ([address]  Active Problems:    Essential hypertension    Hyperlipidemia with target low density lipoprotein (LDL) cholesterol less than 70 mg/dL    S/P CABG x 4    BRANDAN (generalized anxiety disorder)    Malignant neoplasm of esophagus, unspecified location ([address]    Normocytic anemia      Past Medical History  Past Medical History:   Diagnosis Date    Belching 02/01/2022    Black stools 02/01/2022    Borderline diabetes     Dx in 8/2013 - HgA1C 6.2%    C. difficile diarrhea 10/23/2022    pt treated and without symptoms    Chest pain 02/01/2022    Coronary artery disease     On 8/16/13: CABG x 4 with LIMA to LAD and SVG to diagonal, OM and PDA    Decorative tattoo 01/01/2000    Depression     Esophageal cancer ([address]     Essential hypertension     Exposure to medical diagnostic radiation     last tx 8/18/2022    Frequent urination 01/01/2013    Gastroparesis     Heartburn 02/01/2022    High blood pressure     High cholesterol 08/01/2013    Found when I had quadruple bypass    History of COVID-19 10/16/2022    asymptomatic - pt was dx during a hospitalization for another diagnosis.  No continued symptoms    History of stomach ulcers     Hyperlipidemia     Hyperlipidemia LDL goal < 70     Indigestion 02/01/2022    Morbid obesity with BMI of 40.0-44.9, adult ([address]     Nontoxic multinodular goiter     Dx in 8/2013: pt was told that imaging showed thyroid cysts per PCP    Peripheral vascular disease ([address]     pt denies    Personal history of antineoplastic chemotherapy     last chemo 8/2022; immunotherapy every other week - next treatment 4/03/2023    Problems with swallowing 02/01/2022    Pulmonary nodules     Dx in 8/2013: CT chest showed small bilateral fissural-based lung nodules less than 1 cm    S/P CABG x 4     On 8/16/13: CABG x 4 with LIMA to LAD and SVG to diagonal, OM and PDA    Shortness of breath     in past - when alking up stairs. Not current    Vomiting 02/01/2022          Diet Prior to Admission: Regular; Thin liquids       Patient/Family Goals: none stated    SWALLOWING HISTORY  Current Diet Consistency: Regular; Thin liquids  Dysphagia History: as above  Imaging Results:   CXR 11/6/23  CONCLUSION:  No acute radiographic findings. LOCATION:  [facility]                  Dictated by (CST): Ministerio Decker MD on 11/06/2023 at 2:43 PM       Finalized by (CST): Ministerio Decker MD on 11/06/2023 at 2:45 PM     SUBJECTIVE       OBJECTIVE   ORAL MOTOR EXAMINATION  Dentition: Functional  Symmetry: Within Functional Limits  Strength: Within Functional Limits     Range of Motion: Within Functional Limits       Voice Quality: Clear  Respiratory Status: Unlabored  Consistencies Trialed: Thin liquids; Nectar thick liquids; Hard solid  Method of Presentation: Self presentation  Patient Positioning: Upright;Midline    Oral Phase of Swallow: Within Functional Limits                      Pharyngeal Phase of Swallow: Impaired           (Please note: Silent aspiration cannot be evaluated clinically. Videofluoroscopic Swallow Study is required to rule-out silent aspiration.)    Esophageal Phase of Swallow: Complaints consistent with possible esophageal involvement  Comments: d/w RN              GOALS  Goal #1 VFSS  Hold   Goal #2  The patient will tolerate regular consistency and thin liquids without overt signs or symptoms of aspiration with 95 % accuracy over 1-2 session(s).    In progress   Goal #3     Goal #4     Goal #5     Goal #6     Goal #7     Goal #8       FOLLOW UP  Treatment Plan/Recommendations: Videofluoroscopic swallow study     Follow Up Needed (Documentation Required): Yes  SLP Follow-up Date: 11/07/23    Thank you for your referral.   If you have any questions, please contact DAKSHA Sorto

## 2023-11-08 ENCOUNTER — APPOINTMENT (OUTPATIENT)
Dept: CT IMAGING | Facility: HOSPITAL | Age: 53
End: 2023-11-08
Attending: INTERNAL MEDICINE
Payer: COMMERCIAL

## 2023-11-08 ENCOUNTER — ANESTHESIA (OUTPATIENT)
Dept: ENDOSCOPY | Facility: HOSPITAL | Age: 53
End: 2023-11-08
Payer: COMMERCIAL

## 2023-11-08 LAB
ANION GAP SERPL CALC-SCNC: 6 MMOL/L (ref 0–18)
BUN BLD-MCNC: 12 MG/DL (ref 9–23)
CALCIUM BLD-MCNC: 8.6 MG/DL (ref 8.5–10.1)
CHLORIDE SERPL-SCNC: 108 MMOL/L (ref 98–112)
CO2 SERPL-SCNC: 27 MMOL/L (ref 21–32)
CREAT BLD-MCNC: 0.56 MG/DL
DEPRECATED HBV CORE AB SER IA-ACNC: 72.2 NG/ML
EGFRCR SERPLBLD CKD-EPI 2021: 117 ML/MIN/1.73M2 (ref 60–?)
ERYTHROCYTE [DISTWIDTH] IN BLOOD BY AUTOMATED COUNT: 12.6 %
FOLATE SERPL-MCNC: 15.9 NG/ML (ref 8.7–?)
GLUCOSE BLD-MCNC: 91 MG/DL (ref 70–99)
HCT VFR BLD AUTO: 33.4 %
HGB BLD-MCNC: 11.1 G/DL
IRON SATN MFR SERPL: 11 %
IRON SERPL-MCNC: 33 UG/DL
MAGNESIUM SERPL-MCNC: 1.8 MG/DL (ref 1.6–2.6)
MCH RBC QN AUTO: 32.3 PG (ref 26–34)
MCHC RBC AUTO-ENTMCNC: 33.2 G/DL (ref 31–37)
MCV RBC AUTO: 97.1 FL
OSMOLALITY SERPL CALC.SUM OF ELEC: 291 MOSM/KG (ref 275–295)
PLATELET # BLD AUTO: 133 10(3)UL (ref 150–450)
POTASSIUM SERPL-SCNC: 4.1 MMOL/L (ref 3.5–5.1)
RBC # BLD AUTO: 3.44 X10(6)UL
SODIUM SERPL-SCNC: 141 MMOL/L (ref 136–145)
TIBC SERPL-MCNC: 292 UG/DL (ref 240–450)
TRANSFERRIN SERPL-MCNC: 196 MG/DL (ref 200–360)
VIT B12 SERPL-MCNC: 579 PG/ML (ref 193–986)
WBC # BLD AUTO: 4.1 X10(3) UL (ref 4–11)

## 2023-11-08 PROCEDURE — 99232 SBSQ HOSP IP/OBS MODERATE 35: CPT | Performed by: INTERNAL MEDICINE

## 2023-11-08 PROCEDURE — 0DB58ZX EXCISION OF ESOPHAGUS, VIA NATURAL OR ARTIFICIAL OPENING ENDOSCOPIC, DIAGNOSTIC: ICD-10-PCS | Performed by: INTERNAL MEDICINE

## 2023-11-08 PROCEDURE — 71260 CT THORAX DX C+: CPT | Performed by: INTERNAL MEDICINE

## 2023-11-08 PROCEDURE — 99232 SBSQ HOSP IP/OBS MODERATE 35: CPT | Performed by: HOSPITALIST

## 2023-11-08 RX ORDER — MAGNESIUM SULFATE HEPTAHYDRATE 40 MG/ML
2 INJECTION, SOLUTION INTRAVENOUS ONCE
Status: COMPLETED | OUTPATIENT
Start: 2023-11-08 | End: 2023-11-08

## 2023-11-08 RX ORDER — LIDOCAINE HYDROCHLORIDE 20 MG/ML
INJECTION, SOLUTION EPIDURAL; INFILTRATION; INTRACAUDAL; PERINEURAL AS NEEDED
Status: DISCONTINUED | OUTPATIENT
Start: 2023-11-08 | End: 2023-11-08 | Stop reason: SURG

## 2023-11-08 RX ORDER — MAGNESIUM HYDROXIDE/ALUMINUM HYDROXICE/SIMETHICONE 120; 1200; 1200 MG/30ML; MG/30ML; MG/30ML
30 SUSPENSION ORAL 4 TIMES DAILY PRN
Status: DISCONTINUED | OUTPATIENT
Start: 2023-11-08 | End: 2023-11-11

## 2023-11-08 RX ORDER — SODIUM CHLORIDE, SODIUM LACTATE, POTASSIUM CHLORIDE, CALCIUM CHLORIDE 600; 310; 30; 20 MG/100ML; MG/100ML; MG/100ML; MG/100ML
INJECTION, SOLUTION INTRAVENOUS CONTINUOUS
Status: DISCONTINUED | OUTPATIENT
Start: 2023-11-08 | End: 2023-11-11

## 2023-11-08 RX ORDER — NALOXONE HYDROCHLORIDE 0.4 MG/ML
0.08 INJECTION, SOLUTION INTRAMUSCULAR; INTRAVENOUS; SUBCUTANEOUS ONCE AS NEEDED
Status: DISCONTINUED | OUTPATIENT
Start: 2023-11-08 | End: 2023-11-08 | Stop reason: HOSPADM

## 2023-11-08 RX ADMIN — LIDOCAINE HYDROCHLORIDE 120 MG: 20 INJECTION, SOLUTION EPIDURAL; INFILTRATION; INTRACAUDAL; PERINEURAL at 11:07:00

## 2023-11-08 NOTE — PLAN OF CARE
Pt Aox4. VSS. Bradycardic. RA. Afebrile. Report abdominal pain. Managed by Morphine 2mg IV PRN q4hrs. Maintained NPO. EGD completed. CT abdomen done. Will continue plan of care.      Problem: PAIN - ADULT  Goal: Verbalizes/displays adequate comfort level or patient's stated pain goal  Description: INTERVENTIONS:  - Encourage pt to monitor pain and request assistance  - Assess pain using appropriate pain scale  - Administer analgesics based on type and severity of pain and evaluate response  - Implement non-pharmacological measures as appropriate and evaluate response  - Consider cultural and social influences on pain and pain management  - Manage/alleviate anxiety  - Utilize distraction and/or relaxation techniques  - Monitor for opioid side effects  - Notify MD/LIP if interventions unsuccessful or patient reports new pain  - Anticipate increased pain with activity and pre-medicate as appropriate  11/8/2023 1514 by Lewis Espinal RN  Outcome: Progressing  11/8/2023 1514 by Lewis Espinal RN  Outcome: Progressing     Problem: GASTROINTESTINAL - ADULT  Goal: Maintains or returns to baseline bowel function  Description: INTERVENTIONS:  - Assess bowel function  - Maintain adequate hydration with IV or PO as ordered and tolerated  - Evaluate effectiveness of GI medications  - Encourage mobilization and activity  - Obtain nutritional consult as needed  - Establish a toileting routine/schedule  - Consider collaborating with pharmacy to review patient's medication profile  Outcome: Progressing

## 2023-11-08 NOTE — PLAN OF CARE
Patient alert and oriented x4. VSS. Afebrile. C/o abd pain. PRN morphine administered. Medications administered per MAR. IVF infusing. NPO at midnight for EGD. Safety precautions continued. Call light within reach.   Problem: PAIN - ADULT  Goal: Verbalizes/displays adequate comfort level or patient's stated pain goal  Description: INTERVENTIONS:  - Encourage pt to monitor pain and request assistance  - Assess pain using appropriate pain scale  - Administer analgesics based on type and severity of pain and evaluate response  - Implement non-pharmacological measures as appropriate and evaluate response  - Consider cultural and social influences on pain and pain management  - Manage/alleviate anxiety  - Utilize distraction and/or relaxation techniques  - Monitor for opioid side effects  - Notify MD/LIP if interventions unsuccessful or patient reports new pain  - Anticipate increased pain with activity and pre-medicate as appropriate  Outcome: Not Progressing

## 2023-11-08 NOTE — ANESTHESIA POSTPROCEDURE EVALUATION
[address] Patient Status:  Inpatient   Age/Gender 46year old male MRN [id number]   Location [address] [address] Attending Luis Moody MD   [address] Day # 2 PCP Hernandez Lazo MD       Anesthesia Post-op Note    ESOPHAGOGASTRODUODENOSCOPY (EGD) with biopsies    Procedure Summary       Date: 11/08/23 Room / Location: [facility]COPY 02 / [facility]    Anesthesia Start: 1104 Anesthesia Stop: 8342    Procedure: ESOPHAGOGASTRODUODENOSCOPY (EGD) with biopsies Diagnosis: (esophageal fistula)    Surgeons: Monalisa Heller MD Anesthesiologist: Magdalene Prakash MD    Anesthesia Type: MAC ASA Status: 3            Anesthesia Type: MAC    Vitals Value Taken Time   /79 11/08/23 1124   Temp   11/08/23 1125   Pulse 65 11/08/23 1124   Resp 14 11/08/23 1125   SpO2 95 % 11/08/23 1124   Vitals shown include unfiled device data. Patient Location: Endoscopy    Anesthesia Type: MAC    Airway Patency: patent    Postop Pain Control: adequate    Mental Status: mildly sedated but able to meaningfully participate in the post-anesthesia evaluation    Nausea/Vomiting: none    Cardiopulmonary/Hydration status: stable euvolemic    Complications: no apparent anesthesia related complications    Postop vital signs: stable    Dental Exam: Unchanged from Preop    Patient to be discharged from PACU when criteria met.

## 2023-11-09 ENCOUNTER — ANESTHESIA EVENT (OUTPATIENT)
Dept: ENDOSCOPY | Facility: HOSPITAL | Age: 53
End: 2023-11-09
Payer: COMMERCIAL

## 2023-11-09 ENCOUNTER — ANESTHESIA (OUTPATIENT)
Dept: ENDOSCOPY | Facility: HOSPITAL | Age: 53
End: 2023-11-09
Payer: COMMERCIAL

## 2023-11-09 PROBLEM — R09.82 POSTNASAL DRIP: Status: ACTIVE | Noted: 2023-11-09

## 2023-11-09 PROBLEM — R05.1 ACUTE COUGH: Status: ACTIVE | Noted: 2023-11-09

## 2023-11-09 PROBLEM — J86.0: Status: ACTIVE | Noted: 2023-11-09

## 2023-11-09 PROBLEM — J86.0: Status: ACTIVE | Noted: 2023-01-01

## 2023-11-09 PROBLEM — R09.82 POSTNASAL DRIP: Status: ACTIVE | Noted: 2023-01-01

## 2023-11-09 PROBLEM — K22.89 ESOPHAGEAL FISTULA: Status: ACTIVE | Noted: 2023-11-09

## 2023-11-09 PROBLEM — K22.89 ESOPHAGEAL FISTULA: Status: ACTIVE | Noted: 2023-01-01

## 2023-11-09 PROBLEM — R05.1 ACUTE COUGH: Status: ACTIVE | Noted: 2023-01-01

## 2023-11-09 LAB — MAGNESIUM SERPL-MCNC: 2 MG/DL (ref 1.6–2.6)

## 2023-11-09 PROCEDURE — 99232 SBSQ HOSP IP/OBS MODERATE 35: CPT | Performed by: INTERNAL MEDICINE

## 2023-11-09 PROCEDURE — 99232 SBSQ HOSP IP/OBS MODERATE 35: CPT | Performed by: HOSPITALIST

## 2023-11-09 PROCEDURE — 0BJ08ZZ INSPECTION OF TRACHEOBRONCHIAL TREE, VIA NATURAL OR ARTIFICIAL OPENING ENDOSCOPIC: ICD-10-PCS | Performed by: INTERNAL MEDICINE

## 2023-11-09 PROCEDURE — 31622 DX BRONCHOSCOPE/WASH: CPT | Performed by: INTERNAL MEDICINE

## 2023-11-09 PROCEDURE — 99255 IP/OBS CONSLTJ NEW/EST HI 80: CPT | Performed by: INTERNAL MEDICINE

## 2023-11-09 RX ORDER — SODIUM CHLORIDE, SODIUM LACTATE, POTASSIUM CHLORIDE, CALCIUM CHLORIDE 600; 310; 30; 20 MG/100ML; MG/100ML; MG/100ML; MG/100ML
INJECTION, SOLUTION INTRAVENOUS CONTINUOUS
Status: DISCONTINUED | OUTPATIENT
Start: 2023-11-09 | End: 2023-11-11

## 2023-11-09 RX ORDER — DIPHENHYDRAMINE HYDROCHLORIDE 50 MG/ML
12.5 INJECTION INTRAMUSCULAR; INTRAVENOUS EVERY 4 HOURS PRN
Status: DISCONTINUED | OUTPATIENT
Start: 2023-11-09 | End: 2023-11-11

## 2023-11-09 RX ORDER — ONDANSETRON 2 MG/ML
4 INJECTION INTRAMUSCULAR; INTRAVENOUS ONCE AS NEEDED
Status: DISCONTINUED | OUTPATIENT
Start: 2023-11-09 | End: 2023-11-09 | Stop reason: HOSPADM

## 2023-11-09 RX ORDER — SODIUM CHLORIDE, SODIUM LACTATE, POTASSIUM CHLORIDE, CALCIUM CHLORIDE 600; 310; 30; 20 MG/100ML; MG/100ML; MG/100ML; MG/100ML
INJECTION, SOLUTION INTRAVENOUS CONTINUOUS PRN
Status: DISCONTINUED | OUTPATIENT
Start: 2023-11-09 | End: 2023-11-09 | Stop reason: SURG

## 2023-11-09 RX ORDER — OXYMETAZOLINE HYDROCHLORIDE 0.05 G/100ML
1 SPRAY NASAL EVERY 12 HOURS PRN
Status: DISCONTINUED | OUTPATIENT
Start: 2023-11-09 | End: 2023-11-11

## 2023-11-09 RX ORDER — DIPHENHYDRAMINE HCL 25 MG
25 CAPSULE ORAL EVERY 4 HOURS PRN
Status: DISCONTINUED | OUTPATIENT
Start: 2023-11-09 | End: 2023-11-11

## 2023-11-09 RX ORDER — NALOXONE HYDROCHLORIDE 0.4 MG/ML
0.08 INJECTION, SOLUTION INTRAMUSCULAR; INTRAVENOUS; SUBCUTANEOUS ONCE AS NEEDED
Status: DISCONTINUED | OUTPATIENT
Start: 2023-11-09 | End: 2023-11-09 | Stop reason: HOSPADM

## 2023-11-09 RX ORDER — MORPHINE SULFATE 2 MG/ML
2 INJECTION, SOLUTION INTRAMUSCULAR; INTRAVENOUS EVERY 2 HOUR PRN
Status: DISCONTINUED | OUTPATIENT
Start: 2023-11-09 | End: 2023-11-11

## 2023-11-09 RX ORDER — MIDAZOLAM HYDROCHLORIDE 1 MG/ML
INJECTION INTRAMUSCULAR; INTRAVENOUS AS NEEDED
Status: DISCONTINUED | OUTPATIENT
Start: 2023-11-09 | End: 2023-11-09 | Stop reason: SURG

## 2023-11-09 RX ADMIN — SODIUM CHLORIDE, SODIUM LACTATE, POTASSIUM CHLORIDE, CALCIUM CHLORIDE: 600; 310; 30; 20 INJECTION, SOLUTION INTRAVENOUS at 12:14:00

## 2023-11-09 RX ADMIN — MIDAZOLAM HYDROCHLORIDE 2 MG: 1 INJECTION INTRAMUSCULAR; INTRAVENOUS at 12:14:00

## 2023-11-09 NOTE — PROGRESS NOTES
Patient is A/O x4, Kept on NPO and compliant.  placed d/t low sat but able to maintain low 90s on RA. Denies any SOB. VSS, afebrile and mackenzie. Denies any types of discomfort other than abd pain relieved by morphine IV. Needs addressed. Call light within reach.

## 2023-11-09 NOTE — OPERATIVE REPORT
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 EQUIPMENT:   Olympus adult videobronchoscope. Standard pulmonary biopsy forceps    INDICATION: The patient is a 46year old male with history of esophageal cancer s/p chemoRT and gastroesophagectomy 2022, who was admitted with cough with liquids with concern for bronchoesophageal fistula. The procedure is being undertaken for diagnostic purposes. CONSENT:  Risks and benefits were reviewed with the patient in detail regarding the procedure as well as anesthesia prior to the procedure. All questions were answered, and the patient was agreeable. PROCEDURE:  After informed consent was obtained, the patient was brought to the bronchoscopy suite. Time out performed. Patient was placed in a supine position, anesthesia administered, and topical lidocaine given for local anesthesia. First, the videobronchoscope was used and inserted orally through the bite block. The upper airways appeared normal. The vocal cords were insufficiently evaluated due to the level of sedation but appeared normal and approximated well. The bronchoscope was then advanced into the trachea. Evaluation of the trachea and left sided airways demonstrated normal endobronchial examination to the subsegmental level.    Upon evaluation of the right lung, there was a small fistula noted in the medial aspect of the distal right main stem. This measured approximately 10mm distal to the juan. The right upper lobe, bronchus intermedius, right middle and lower lobe bronchi and respective segments were patent and normal.   There was no evidence of bleeding and the bronchoscope was then withdrawn. The patient tolerated the procedure well without immediate complications. EBL:  none     IMPRESSION:   Right main stem bronchoesophageal fistula     PLAN:  Reviewed with Dr. Carla Casillas.  Will keep patient NPO and await EGD biopsy results, possible EGD/stenting tomorrow     Carlos Jarvis MD

## 2023-11-09 NOTE — ANESTHESIA POSTPROCEDURE EVALUATION
[address] Patient Status:  Inpatient   Age/Gender 46year old male MRN [id number]   Location [address] [address] Attending Eulalio Worthington MD   [place] [facility] Day # 3 PCP Anselmo Zarate MD       Anesthesia Post-op Note    BRONCHOSCOPY    Procedure Summary       Date: 11/09/23 Room / Location: [facility] ENDOSCOPY 03 / [facility] ENDOSCOPY    Anesthesia Start: 7213 Anesthesia Stop: 3698    Procedure: BRONCHOSCOPY Diagnosis: (Bronchoesophageal Fistula)    Surgeons: Miller Mix MD Anesthesiologist: Won Munson MD    Anesthesia Type: MAC ASA Status: 3            Anesthesia Type: MAC    Vitals Value Taken Time   /67 11/09/23 1230   Temp  11/09/23 1231   Pulse 59 11/09/23 1230   Resp 16 11/09/23 1231   SpO2 92% 11/09/23 1230   Vitals shown include unfiled device data.     Patient Location: Endoscopy    Anesthesia Type: MAC    Airway Patency: patent    Postop Pain Control: adequate    Mental Status: mildly sedated but able to meaningfully participate in the post-anesthesia evaluation    Nausea/Vomiting: none    Cardiopulmonary/Hydration status: stable euvolemic    Complications: no apparent anesthesia related complications    Postop vital signs: stable    Dental Exam: Unchanged from Postop

## 2023-11-09 NOTE — INTERVAL H&P NOTE
Pre-op Diagnosis: Bronchoesophageal Fistula    The above referenced H&P was reviewed by Brigette Parra MD on 11/9/2023, the patient was examined and no significant changes have occurred in the patient's condition since the H&P was performed. I discussed with the patient and/or legal representative the potential benefits, risks and side effects of this procedure; the likelihood of the patient achieving goals; and potential problems that might occur during recuperation. I discussed reasonable alternatives to the procedure, including risks, benefits and side effects related to the alternatives and risks related to not receiving this procedure. We will proceed with procedure as planned.

## 2023-11-09 NOTE — PLAN OF CARE
Pt Aox4. VSS. Bradycardic. RA. Afebrile. Report abdominal pain. Managed by Morphine 2mg IV PRN q4hrs. Benadryl 12.5 mg IV administered for secretions/ sinus congestion. Bronchoscopy completed. Maintained NPO. Plan for EGD tomorrow. Will continue plan of care.        Problem: PAIN - ADULT  Goal: Verbalizes/displays adequate comfort level or patient's stated pain goal  Description: INTERVENTIONS:  - Encourage pt to monitor pain and request assistance  - Assess pain using appropriate pain scale  - Administer analgesics based on type and severity of pain and evaluate response  - Implement non-pharmacological measures as appropriate and evaluate response  - Consider cultural and social influences on pain and pain management  - Manage/alleviate anxiety  - Utilize distraction and/or relaxation techniques  - Monitor for opioid side effects  - Notify MD/LIP if interventions unsuccessful or patient reports new pain  - Anticipate increased pain with activity and pre-medicate as appropriate  Outcome: Progressing     Problem: GASTROINTESTINAL - ADULT  Goal: Maintains or returns to baseline bowel function  Description: INTERVENTIONS:  - Assess bowel function  - Maintain adequate hydration with IV or PO as ordered and tolerated  - Evaluate effectiveness of GI medications  - Encourage mobilization and activity  - Obtain nutritional consult as needed  - Establish a toileting routine/schedule  - Consider collaborating with pharmacy to review patient's medication profile  Outcome: Progressing

## 2023-11-10 ENCOUNTER — SOCIAL WORK SERVICES (OUTPATIENT)
Dept: HEMATOLOGY/ONCOLOGY | Facility: HOSPITAL | Age: 53
End: 2023-11-10

## 2023-11-10 ENCOUNTER — APPOINTMENT (OUTPATIENT)
Dept: GENERAL RADIOLOGY | Facility: HOSPITAL | Age: 53
End: 2023-11-10
Attending: INTERNAL MEDICINE
Payer: COMMERCIAL

## 2023-11-10 ENCOUNTER — APPOINTMENT (OUTPATIENT)
Dept: GENERAL RADIOLOGY | Facility: HOSPITAL | Age: 53
End: 2023-11-10
Attending: HOSPITALIST
Payer: COMMERCIAL

## 2023-11-10 ENCOUNTER — ANESTHESIA (OUTPATIENT)
Dept: ENDOSCOPY | Facility: HOSPITAL | Age: 53
End: 2023-11-10
Payer: COMMERCIAL

## 2023-11-10 LAB
ANION GAP SERPL CALC-SCNC: 9 MMOL/L (ref 0–18)
BUN BLD-MCNC: 10 MG/DL (ref 9–23)
CALCIUM BLD-MCNC: 8.6 MG/DL (ref 8.5–10.1)
CHLORIDE SERPL-SCNC: 104 MMOL/L (ref 98–112)
CO2 SERPL-SCNC: 25 MMOL/L (ref 21–32)
CREAT BLD-MCNC: 0.6 MG/DL
EGFRCR SERPLBLD CKD-EPI 2021: 115 ML/MIN/1.73M2 (ref 60–?)
GLUCOSE BLD-MCNC: 79 MG/DL (ref 70–99)
MAGNESIUM SERPL-MCNC: 1.8 MG/DL (ref 1.6–2.6)
OSMOLALITY SERPL CALC.SUM OF ELEC: 284 MOSM/KG (ref 275–295)
PHOSPHATE SERPL-MCNC: 3.1 MG/DL (ref 2.5–4.9)
POTASSIUM SERPL-SCNC: 3.8 MMOL/L (ref 3.5–5.1)
SODIUM SERPL-SCNC: 138 MMOL/L (ref 136–145)

## 2023-11-10 PROCEDURE — 99232 SBSQ HOSP IP/OBS MODERATE 35: CPT | Performed by: HOSPITALIST

## 2023-11-10 PROCEDURE — 74328 X-RAY BILE DUCT ENDOSCOPY: CPT | Performed by: INTERNAL MEDICINE

## 2023-11-10 PROCEDURE — 99232 SBSQ HOSP IP/OBS MODERATE 35: CPT | Performed by: INTERNAL MEDICINE

## 2023-11-10 PROCEDURE — 71045 X-RAY EXAM CHEST 1 VIEW: CPT | Performed by: HOSPITALIST

## 2023-11-10 PROCEDURE — 0D748DZ DILATION OF ESOPHAGOGASTRIC JUNCTION WITH INTRALUMINAL DEVICE, VIA NATURAL OR ARTIFICIAL OPENING ENDOSCOPIC: ICD-10-PCS | Performed by: INTERNAL MEDICINE

## 2023-11-10 DEVICE — STENT SYSTEM
Type: IMPLANTABLE DEVICE | Site: ESOPHAGUS | Status: FUNCTIONAL
Brand: WALLFLEX™ ESOPHAGEAL

## 2023-11-10 RX ORDER — POTASSIUM CHLORIDE 14.9 MG/ML
20 INJECTION INTRAVENOUS ONCE
Status: COMPLETED | OUTPATIENT
Start: 2023-11-10 | End: 2023-11-10

## 2023-11-10 RX ORDER — MAGNESIUM SULFATE HEPTAHYDRATE 40 MG/ML
2 INJECTION, SOLUTION INTRAVENOUS ONCE
Status: COMPLETED | OUTPATIENT
Start: 2023-11-10 | End: 2023-11-10

## 2023-11-10 RX ORDER — SODIUM CHLORIDE, SODIUM LACTATE, POTASSIUM CHLORIDE, CALCIUM CHLORIDE 600; 310; 30; 20 MG/100ML; MG/100ML; MG/100ML; MG/100ML
INJECTION, SOLUTION INTRAVENOUS CONTINUOUS
Status: DISCONTINUED | OUTPATIENT
Start: 2023-11-10 | End: 2023-11-11

## 2023-11-10 RX ORDER — RUFINAMIDE 40 MG/ML
1 SUSPENSION ORAL DAILY
Status: DISCONTINUED | OUTPATIENT
Start: 2023-11-10 | End: 2023-11-11

## 2023-11-10 RX ORDER — NALOXONE HYDROCHLORIDE 0.4 MG/ML
0.08 INJECTION, SOLUTION INTRAMUSCULAR; INTRAVENOUS; SUBCUTANEOUS ONCE AS NEEDED
Status: DISCONTINUED | OUTPATIENT
Start: 2023-11-10 | End: 2023-11-10 | Stop reason: HOSPADM

## 2023-11-10 RX ADMIN — SODIUM CHLORIDE: 9 INJECTION, SOLUTION INTRAVENOUS at 16:45:00

## 2023-11-10 NOTE — PLAN OF CARE
Pt received A&Ox4. Afebrile. VSS. C/o abd pain - prn morphine given per MAR. Requesting prn benadryl for cough/post nasal drip, pt verbalizing relief. EGD w/ stent placement and suturing completed by Dr. Codie Hearn. Orders to advance diet as tolerated to soft diet. Per MD, okay for pt to get lovenox this evening. Electrolytes replaced per protocol. IVF infusing @ 100ml/hr. Call light in reach. 1900 - Pt has been coughing frequently following return from endo. Only trialing sips of water. Spoke w/ Dr. Bambi Tripp and advised to keep pt's HOB elevated, but coughing should not be related to stent placement. If pt continues to cough w/ PO intake then make NPO. Pulm signed off - on call hospitalist (Dr. Marina Lizama) paged regarding how to proceed. 1915 - Portable CXR ordered per Dr. Marina Lizama.

## 2023-11-10 NOTE — PLAN OF CARE
Patient is A/O x4, verbalized coughing and itching improvement with diphenhydramine. ABD pain also improving with less coughs but continue need for pain management. VSS and afebrile. EGD in the afternoon, consent signed. Needs addressed. Call light within reach.      Problem: PAIN - ADULT  Goal: Verbalizes/displays adequate comfort level or patient's stated pain goal  Description: INTERVENTIONS:  - Encourage pt to monitor pain and request assistance  - Assess pain using appropriate pain scale  - Administer analgesics based on type and severity of pain and evaluate response  - Implement non-pharmacological measures as appropriate and evaluate response  - Consider cultural and social influences on pain and pain management  - Manage/alleviate anxiety  - Utilize distraction and/or relaxation techniques  - Monitor for opioid side effects  - Notify MD/LIP if interventions unsuccessful or patient reports new pain  - Anticipate increased pain with activity and pre-medicate as appropriate  Outcome: Progressing

## 2023-11-10 NOTE — ANESTHESIA POSTPROCEDURE EVALUATION
[address] Patient Status:  Inpatient   Age/Gender 46year old male MRN [id number]   Location [address] [address] Attending Chela Larson MD   [facility] Day # 4 PCP Carolina Ramos MD       Anesthesia Post-op Note    ESOPHAGOGASTRODUODENOSCOPY with esophageal stenting with suturing    Procedure Summary       Date: 11/10/23 Room / Location: [address] [place] [address] ENDOSCOPY 01 / [facility] ENDOSCOPY    Anesthesia Start: 6196 Anesthesia Stop: 1645    Procedure: ESOPHAGOGASTRODUODENOSCOPY with esophageal stenting with suturing Diagnosis: (bronchoesophageal fistula, stent placement)    Surgeons: Petrona Lobo MD Anesthesiologist: Alesia Johnson MD    Anesthesia Type: MAC ASA Status: 3            Anesthesia Type: MAC    Vitals Value Taken Time   /66 11/10/23 1646   Temp  11/10/23 1647   Pulse 57 11/10/23 1646   Resp 16 11/10/23 1646   SpO2 98 % 11/10/23 1646       Patient Location: Endoscopy    Anesthesia Type: MAC    Airway Patency: patent    Mental Status: mildly sedated but able to meaningfully participate in the post-anesthesia evaluation    Nausea/Vomiting: none    Cardiopulmonary/Hydration status: stable euvolemic    Complications: no apparent anesthesia related complications    Postop vital signs: stable    Dental Exam: Unchanged from Preop    Patient to be discharged from PACU when criteria met.

## 2023-11-10 NOTE — PROGRESS NOTES
SW received FMLA for pt's wife, Mina Chin for an intermittent leave. SW spoke to pt and his wife to discuss the paperwork needed. [facility] paperwork completed and faxed to [address] [place] [address] fax [phone]. SW sent a copy of the completed paperwork via [employer] message. SHARON ColladoW   at [facility] 93, 24 [facility], [place], [address] [facility]  Christianla Joe Shen [address] [place] [address] BEHAV[facility], [facility] Joe Perdomo  Ph: [phone]. [email or web address]  Fax: [phone]

## 2023-11-11 VITALS
OXYGEN SATURATION: 98 % | SYSTOLIC BLOOD PRESSURE: 134 MMHG | TEMPERATURE: 97 F | HEART RATE: 55 BPM | BODY MASS INDEX: 26 KG/M2 | RESPIRATION RATE: 18 BRPM | WEIGHT: 200 LBS | DIASTOLIC BLOOD PRESSURE: 66 MMHG

## 2023-11-11 LAB
MAGNESIUM SERPL-MCNC: 1.7 MG/DL (ref 1.6–2.6)
POTASSIUM SERPL-SCNC: 3.8 MMOL/L (ref 3.5–5.1)

## 2023-11-11 PROCEDURE — 99239 HOSP IP/OBS DSCHRG MGMT >30: CPT | Performed by: HOSPITALIST

## 2023-11-11 PROCEDURE — 99232 SBSQ HOSP IP/OBS MODERATE 35: CPT | Performed by: INTERNAL MEDICINE

## 2023-11-11 RX ORDER — OMEPRAZOLE 40 MG/1
40 CAPSULE, DELAYED RELEASE ORAL DAILY
Qty: 90 CAPSULE | Refills: 3 | Status: SHIPPED | OUTPATIENT
Start: 2023-11-11 | End: 2024-11-05

## 2023-11-11 NOTE — PLAN OF CARE
NURSING DISCHARGE NOTE    Discharged Home via Wheelchair. Accompanied by RN  Belongings Taken by patient/family. Patient A+Ox4. Discharged home via wheelchair. Port flushed with heparin and deaccessed. Instructions reviewed and given to patient. All questions answered. All belongings taken by patient.

## 2023-11-11 NOTE — PLAN OF CARE
Patient A+Ox4. Had a clear liquid diet for breakfast, adv as tolerated. Able to eat a soft diet for lunch with no issues. Pain medication given x2. IVF per [facility].  Plan to discharge later

## 2023-11-11 NOTE — PLAN OF CARE
Pt received A&Ox4. VSS. RA. Afebrile. Tolerating clear liquids. Pt c/o cramping abd pain, morphine given prn. Medications given per MAR. IVF @ 100 ml/hr. Call light within reach.      Problem: PAIN - ADULT  Goal: Verbalizes/displays adequate comfort level or patient's stated pain goal  Description: INTERVENTIONS:  - Encourage pt to monitor pain and request assistance  - Assess pain using appropriate pain scale  - Administer analgesics based on type and severity of pain and evaluate response  - Implement non-pharmacological measures as appropriate and evaluate response  - Consider cultural and social influences on pain and pain management  - Manage/alleviate anxiety  - Utilize distraction and/or relaxation techniques  - Monitor for opioid side effects  - Notify MD/LIP if interventions unsuccessful or patient reports new pain  - Anticipate increased pain with activity and pre-medicate as appropriate  Outcome: Progressing     Problem: GASTROINTESTINAL - ADULT  Goal: Maintains or returns to baseline bowel function  Description: INTERVENTIONS:  - Assess bowel function  - Maintain adequate hydration with IV or PO as ordered and tolerated  - Evaluate effectiveness of GI medications  - Encourage mobilization and activity  - Obtain nutritional consult as needed  - Establish a toileting routine/schedule  - Consider collaborating with pharmacy to review patient's medication profile  Outcome: Progressing

## 2023-11-12 NOTE — DISCHARGE SUMMARY
[facility]  DISCHARGE SUMMARY     Mireya Echeverria Patient Status:  Inpatient    10/15/1970 MRN [id number]   [facility] 4NW-A Attending No att. providers found   Hosp Day # 5 PCP Xu Lancaster MD     Date of Admission: 2023  Date of Discharge: 2023    Discharge Disposition: Home or Self Care    Admitting Diagnosis:   Aspiration pneumonitis ([facility] [address] [J69.0]  Malignant neoplasm of esophagus, unspecified location [facility]) [C15.9]    Hospital Discharge Diagnoses:  #Dysphagia  -UGI reviewed   -s/p EGD with suggested anastomosis leak and esophageal fistula tract opening on one of the ulcer bases   -CT Sx consulted - rec interventional Pulm  -Dr. Jennifer Encinas s/p bronch confirming bronchoesophageal fistula  -subsequent EGD/ stenting on 11/10 with Dr. Nishi Sewell      #Metastatic esophageal cancer-Onc following     #CAD with prior CABG-BB     #GERD-PPI     #BRANDAN-SSRI     #postnasal drip- afrin, benadryl PRN      #Moderate malnutrition- appreciate dietary evaluation and recs     Lace+ Score: 76  59-90 High Risk  29-58 Medium Risk  0-28   Low Risk. Risk of readmission: Mireya Echeverria has High Risk of readmission after discharge from the hospital.    History of Present Illness: Mireya Echeverria is a 46year old male with known esophageal cancer, CAD with previous CABG, depression, hypertension, peptic ulcer disease presents to the ER with complaints of coughing when trying to drink fluids. Patient states he is able to eat okay but since the past 3 days he coughs every time he drinks any fluids. He feels as if it is going down the wrong pipe. He starts to cough with clear productive sputum. He denies vomiting, chest pain, nausea. He also feels short of breath. He is currently on chemotherapy/immunotherapy per Dr. Jeannie Ganser. He also follows with Dr. Nishi Sewell and was trying to schedule an EGD for possible stricture. He also c/o chills for the past several days.      Brief Synopsis: GI was placed on consultation as well as pulmonology. Patient had EGD performed on 11/8 with esophagogastric anastomosis with suspected fistula tract opening and ulcer. Pulmonary on consult and underwent bronchoscopy on 11/9 confirming fistula formation. Thoracic surgery was placed on consultation and then collaboration with GI recommended repeat EGD with stenting. Patient underwent stenting on 74/49 without complication. Patient was doing well. Patient was cleared by the consultants but warrants close outpatient follow-up. Oncology was also following throughout the hospital course. No further recommendations and patient was discharged in stable condition. Procedures during hospitalization:   11/8 EGD with biopsy  11/9 Bronchoscopy  11/10 EGD with stenting    Incidental or significant findings and recommendations (brief descriptions):  Per Brief Synopsis of Hospital Course    Lab/Test results pending at Discharge:   Biopsy results    Consultants:  GI, oncology, pulmonology, thoracic surgery    Discharge Medication List:     Discharge Medications        CONTINUE taking these medications        Instructions Prescription details   baclofen 10 MG Tabs  Commonly known as: Lioresal      Take 1 tablet (10 mg total) by mouth 3 (three) times daily as needed. Stop taking on: January 28, 2024  Quantity: 180 tablet  Refills: 0     Creon 29470-14566 units Cpep  Generic drug: Pancrelipase (Lip-Prot-Amyl)      Take 2 capsules with meals and 1 capsule with snacks   Quantity: 240 capsule  Refills: 0     metoprolol succinate ER 50 MG Tb24  Commonly known as: Toprol XL      TAKE 1 TABLET BY MOUTH EVERY DAY   Quantity: 90 tablet  Refills: 1     Omeprazole 40 MG Cpdr      Take 1 capsule (40 mg total) by mouth daily. Quantity: 90 capsule  Refills: 3     sertraline 100 MG Tabs  Commonly known as: Zoloft      Take 1.5 tablets (150 mg total) by mouth daily.    Quantity: 135 tablet  Refills: 1               Where to Get Your Medications        These medications were sent to [address] [facility] [address] Sean Salazar [phone], [address]  Sean Salazar, [address] [facility]      Phone: [phone]   Omeprazole 40 MG Cpdr         Follow-up appointment:   Krystal Cabello, [address] [phone]    Follow up in 2 week(s)  Follow up      -----------------------------------------------------------------------------------------------  PATIENT DISCHARGE INSTRUCTIONS: See electronic chart    Gilles Desir MD 11/12/2023    Time spent: 33 minutes

## 2023-11-13 ENCOUNTER — TELEPHONE (OUTPATIENT)
Dept: HEMATOLOGY/ONCOLOGY | Age: 53
End: 2023-11-13

## 2023-11-13 ENCOUNTER — PATIENT OUTREACH (OUTPATIENT)
Dept: CASE MANAGEMENT | Age: 53
End: 2023-11-13

## 2023-11-13 NOTE — TELEPHONE ENCOUNTER
Toxicities: C11 D1 Trastuzumab-qyyp on 10/31/2023    Patient hospitalized from 11/6/2023 - 11/11/2023 bronchoesopophageal fistula with subsequent EDG/stenting on 11/10/2023 by Dr Amber Lopez. Patient reports since being discharged from the hospital on 11/11/2023 he has been coughing up fluids every time he tries to drink. No chills, shakes or fever. The PSR's note also mention palpitations. Ben Salcido said he is not having any heart issues or palpitations. I explained that Ben Salcido needs to call Dr Zhane Qiu office now to update him. He said Dr Kim Cox was going to send him to another specialist if this happened again. I asked him to please hold. I updated Molina's nurse. She spoke with Dr Kim Cox. He wants Ronen to call Dr Joaquin Hinkle. I updated Ronen. Her verbalized understanding. He will call his office now.

## 2023-11-13 NOTE — TELEPHONE ENCOUNTER
Pt is calling to speak to the nurse states anything that he is drinking he is coughing it back up.  He states he is also having palpitations-NL

## 2023-11-15 ENCOUNTER — TELEPHONE (OUTPATIENT)
Dept: HEMATOLOGY/ONCOLOGY | Age: 53
End: 2023-11-15

## 2023-11-15 NOTE — TELEPHONE ENCOUNTER
Patient is scheduled Monday  for 3 appts. He is also scheduled to be at [facility]  for placement of stent in his airway.     Asking if getting infusion  and stent placement in same day is Ok:    Or would it be better to reschedule  the PLFD appts    Please advise

## 2023-11-20 ENCOUNTER — APPOINTMENT (OUTPATIENT)
Dept: HEMATOLOGY/ONCOLOGY | Age: 53
End: 2023-11-20
Attending: INTERNAL MEDICINE
Payer: COMMERCIAL

## 2023-11-21 ENCOUNTER — APPOINTMENT (OUTPATIENT)
Dept: HEMATOLOGY/ONCOLOGY | Age: 53
End: 2023-11-21
Attending: INTERNAL MEDICINE
Payer: COMMERCIAL

## 2023-11-24 ENCOUNTER — TELEPHONE (OUTPATIENT)
Dept: HEMATOLOGY/ONCOLOGY | Facility: HOSPITAL | Age: 53
End: 2023-11-24

## 2023-11-26 ENCOUNTER — OFFICE VISIT (OUTPATIENT)
Dept: FAMILY MEDICINE CLINIC | Facility: CLINIC | Age: 53
End: 2023-11-26
Payer: COMMERCIAL

## 2023-11-26 ENCOUNTER — HOSPITAL ENCOUNTER (INPATIENT)
Facility: HOSPITAL | Age: 53
LOS: 3 days | Discharge: LEFT BEFORE DISCHARGE | DRG: 982 | End: 2023-11-29
Attending: EMERGENCY MEDICINE | Admitting: STUDENT IN AN ORGANIZED HEALTH CARE EDUCATION/TRAINING PROGRAM
Payer: COMMERCIAL

## 2023-11-26 ENCOUNTER — APPOINTMENT (OUTPATIENT)
Dept: CT IMAGING | Facility: HOSPITAL | Age: 53
DRG: 982 | End: 2023-11-26
Attending: EMERGENCY MEDICINE
Payer: COMMERCIAL

## 2023-11-26 ENCOUNTER — APPOINTMENT (OUTPATIENT)
Dept: GENERAL RADIOLOGY | Facility: HOSPITAL | Age: 53
DRG: 982 | End: 2023-11-26
Attending: EMERGENCY MEDICINE
Payer: COMMERCIAL

## 2023-11-26 VITALS
SYSTOLIC BLOOD PRESSURE: 130 MMHG | BODY MASS INDEX: 23.49 KG/M2 | WEIGHT: 183 LBS | TEMPERATURE: 98 F | DIASTOLIC BLOOD PRESSURE: 88 MMHG | HEART RATE: 52 BPM | HEIGHT: 74 IN | OXYGEN SATURATION: 98 %

## 2023-11-26 DIAGNOSIS — J18.9 PNEUMONIA OF BOTH LOWER LOBES DUE TO INFECTIOUS ORGANISM: Primary | ICD-10-CM

## 2023-11-26 DIAGNOSIS — R05.8 COUGH PRESENT FOR GREATER THAN 3 WEEKS: Primary | ICD-10-CM

## 2023-11-26 PROBLEM — Z22.1 CLOSTRIDIOIDES DIFFICILE CARRIER: Status: ACTIVE | Noted: 2023-11-26

## 2023-11-26 PROBLEM — Z22.1 CLOSTRIDIOIDES DIFFICILE CARRIER: Status: ACTIVE | Noted: 2023-01-01

## 2023-11-26 PROBLEM — C25.9 MALIGNANT NEOPLASM OF PANCREAS (HCC): Status: ACTIVE | Noted: 2023-11-26

## 2023-11-26 PROBLEM — C15.9 ESOPHAGEAL CARCINOMA (HCC): Status: ACTIVE | Noted: 2023-01-01

## 2023-11-26 PROBLEM — C15.9 ESOPHAGEAL CARCINOMA (HCC): Status: ACTIVE | Noted: 2023-11-06

## 2023-11-26 PROBLEM — C25.9 MALIGNANT NEOPLASM OF PANCREAS (HCC): Status: ACTIVE | Noted: 2023-01-01

## 2023-11-26 LAB
ALBUMIN SERPL-MCNC: 3.2 G/DL (ref 3.4–5)
ALBUMIN/GLOB SERPL: 0.7 {RATIO} (ref 1–2)
ALP LIVER SERPL-CCNC: 137 U/L
ALT SERPL-CCNC: 19 U/L
ANION GAP SERPL CALC-SCNC: 4 MMOL/L (ref 0–18)
AST SERPL-CCNC: 22 U/L (ref 15–37)
ATRIAL RATE: 56 BPM
BASOPHILS # BLD AUTO: 0.06 X10(3) UL (ref 0–0.2)
BASOPHILS NFR BLD AUTO: 0.5 %
BILIRUB SERPL-MCNC: 0.5 MG/DL (ref 0.1–2)
BUN BLD-MCNC: 14 MG/DL (ref 9–23)
CALCIUM BLD-MCNC: 9.1 MG/DL (ref 8.5–10.1)
CHLORIDE SERPL-SCNC: 107 MMOL/L (ref 98–112)
CO2 SERPL-SCNC: 27 MMOL/L (ref 21–32)
CREAT BLD-MCNC: 0.69 MG/DL
D DIMER PPP FEU-MCNC: <0.27 UG/ML FEU (ref ?–0.54)
EGFRCR SERPLBLD CKD-EPI 2021: 110 ML/MIN/1.73M2 (ref 60–?)
EOSINOPHIL # BLD AUTO: 0.31 X10(3) UL (ref 0–0.7)
EOSINOPHIL NFR BLD AUTO: 2.7 %
ERYTHROCYTE [DISTWIDTH] IN BLOOD BY AUTOMATED COUNT: 12.6 %
FLUAV + FLUBV RNA SPEC NAA+PROBE: NEGATIVE
FLUAV + FLUBV RNA SPEC NAA+PROBE: NEGATIVE
GLOBULIN PLAS-MCNC: 4.3 G/DL (ref 2.8–4.4)
GLUCOSE BLD-MCNC: 106 MG/DL (ref 70–99)
HCT VFR BLD AUTO: 40.2 %
HGB BLD-MCNC: 13.4 G/DL
IMM GRANULOCYTES # BLD AUTO: 0.12 X10(3) UL (ref 0–1)
IMM GRANULOCYTES NFR BLD: 1.1 %
LACTATE SERPL-SCNC: 0.4 MMOL/L (ref 0.4–2)
LYMPHOCYTES # BLD AUTO: 0.77 X10(3) UL (ref 1–4)
LYMPHOCYTES NFR BLD AUTO: 6.8 %
MCH RBC QN AUTO: 31.9 PG (ref 26–34)
MCHC RBC AUTO-ENTMCNC: 33.3 G/DL (ref 31–37)
MCV RBC AUTO: 95.7 FL
MONOCYTES # BLD AUTO: 0.6 X10(3) UL (ref 0.1–1)
MONOCYTES NFR BLD AUTO: 5.3 %
NEUTROPHILS # BLD AUTO: 9.54 X10 (3) UL (ref 1.5–7.7)
NEUTROPHILS # BLD AUTO: 9.54 X10(3) UL (ref 1.5–7.7)
NEUTROPHILS NFR BLD AUTO: 83.6 %
NT-PROBNP SERPL-MCNC: 171 PG/ML (ref ?–125)
OSMOLALITY SERPL CALC.SUM OF ELEC: 287 MOSM/KG (ref 275–295)
P AXIS: 21 DEGREES
P-R INTERVAL: 164 MS
PLATELET # BLD AUTO: 226 10(3)UL (ref 150–450)
POTASSIUM SERPL-SCNC: 4.2 MMOL/L (ref 3.5–5.1)
PROCALCITONIN SERPL-MCNC: 0.19 NG/ML (ref ?–0.16)
PROT SERPL-MCNC: 7.5 G/DL (ref 6.4–8.2)
Q-T INTERVAL: 416 MS
QRS DURATION: 86 MS
QTC CALCULATION (BEZET): 401 MS
R AXIS: 51 DEGREES
RBC # BLD AUTO: 4.2 X10(6)UL
RSV RNA SPEC NAA+PROBE: NEGATIVE
SARS-COV-2 RNA RESP QL NAA+PROBE: NOT DETECTED
SODIUM SERPL-SCNC: 138 MMOL/L (ref 136–145)
T AXIS: 70 DEGREES
TROPONIN I SERPL HS-MCNC: 5 NG/L
VENTRICULAR RATE: 56 BPM
WBC # BLD AUTO: 11.4 X10(3) UL (ref 4–11)

## 2023-11-26 PROCEDURE — 99255 IP/OBS CONSLTJ NEW/EST HI 80: CPT | Performed by: INTERNAL MEDICINE

## 2023-11-26 PROCEDURE — 71260 CT THORAX DX C+: CPT | Performed by: EMERGENCY MEDICINE

## 2023-11-26 PROCEDURE — 71045 X-RAY EXAM CHEST 1 VIEW: CPT | Performed by: EMERGENCY MEDICINE

## 2023-11-26 PROCEDURE — 99223 1ST HOSP IP/OBS HIGH 75: CPT | Performed by: STUDENT IN AN ORGANIZED HEALTH CARE EDUCATION/TRAINING PROGRAM

## 2023-11-26 RX ORDER — ECHINACEA PURPUREA EXTRACT 125 MG
1 TABLET ORAL
Status: DISCONTINUED | OUTPATIENT
Start: 2023-11-26 | End: 2023-11-29

## 2023-11-26 RX ORDER — MORPHINE SULFATE 2 MG/ML
2 INJECTION, SOLUTION INTRAMUSCULAR; INTRAVENOUS EVERY 2 HOUR PRN
Status: DISCONTINUED | OUTPATIENT
Start: 2023-11-26 | End: 2023-11-29

## 2023-11-26 RX ORDER — IPRATROPIUM BROMIDE AND ALBUTEROL SULFATE 2.5; .5 MG/3ML; MG/3ML
3 SOLUTION RESPIRATORY (INHALATION)
Status: DISCONTINUED | OUTPATIENT
Start: 2023-11-26 | End: 2023-11-29

## 2023-11-26 RX ORDER — ONDANSETRON 2 MG/ML
4 INJECTION INTRAMUSCULAR; INTRAVENOUS EVERY 6 HOURS PRN
Status: DISCONTINUED | OUTPATIENT
Start: 2023-11-26 | End: 2023-11-29

## 2023-11-26 RX ORDER — PROCHLORPERAZINE EDISYLATE 5 MG/ML
5 INJECTION INTRAMUSCULAR; INTRAVENOUS EVERY 8 HOURS PRN
Status: DISCONTINUED | OUTPATIENT
Start: 2023-11-26 | End: 2023-11-29

## 2023-11-26 RX ORDER — MORPHINE SULFATE 4 MG/ML
4 INJECTION, SOLUTION INTRAMUSCULAR; INTRAVENOUS ONCE
Status: COMPLETED | OUTPATIENT
Start: 2023-11-26 | End: 2023-11-26

## 2023-11-26 RX ORDER — VANCOMYCIN HYDROCHLORIDE
15 ONCE
Status: COMPLETED | OUTPATIENT
Start: 2023-11-26 | End: 2023-11-26

## 2023-11-26 RX ORDER — MORPHINE SULFATE 2 MG/ML
2 INJECTION, SOLUTION INTRAMUSCULAR; INTRAVENOUS ONCE
Status: DISCONTINUED | OUTPATIENT
Start: 2023-11-26 | End: 2023-11-29

## 2023-11-26 RX ORDER — VANCOMYCIN HYDROCHLORIDE
15 EVERY 12 HOURS
Status: DISCONTINUED | OUTPATIENT
Start: 2023-11-27 | End: 2023-11-26 | Stop reason: SDUPTHER

## 2023-11-26 RX ORDER — SENNOSIDES 8.6 MG
17.2 TABLET ORAL NIGHTLY PRN
Status: DISCONTINUED | OUTPATIENT
Start: 2023-11-26 | End: 2023-11-29

## 2023-11-26 RX ORDER — ENOXAPARIN SODIUM 100 MG/ML
40 INJECTION SUBCUTANEOUS EVERY EVENING
Status: DISCONTINUED | OUTPATIENT
Start: 2023-11-26 | End: 2023-11-29

## 2023-11-26 RX ORDER — ACETAMINOPHEN 500 MG
1000 TABLET ORAL EVERY 8 HOURS PRN
Status: DISCONTINUED | OUTPATIENT
Start: 2023-11-26 | End: 2023-11-29

## 2023-11-26 RX ORDER — BENZONATATE 200 MG/1
200 CAPSULE ORAL 3 TIMES DAILY PRN
Status: DISCONTINUED | OUTPATIENT
Start: 2023-11-26 | End: 2023-11-29

## 2023-11-26 RX ORDER — SODIUM CHLORIDE, SODIUM LACTATE, POTASSIUM CHLORIDE, CALCIUM CHLORIDE 600; 310; 30; 20 MG/100ML; MG/100ML; MG/100ML; MG/100ML
INJECTION, SOLUTION INTRAVENOUS CONTINUOUS
Status: DISCONTINUED | OUTPATIENT
Start: 2023-11-26 | End: 2023-11-29

## 2023-11-26 RX ORDER — ALBUTEROL SULFATE 2.5 MG/3ML
2.5 SOLUTION RESPIRATORY (INHALATION)
Status: ON HOLD | COMMUNITY
Start: 2023-11-22

## 2023-11-26 RX ORDER — MORPHINE SULFATE 4 MG/ML
4 INJECTION, SOLUTION INTRAMUSCULAR; INTRAVENOUS EVERY 2 HOUR PRN
Status: DISCONTINUED | OUTPATIENT
Start: 2023-11-26 | End: 2023-11-29

## 2023-11-26 RX ORDER — BISACODYL 10 MG
10 SUPPOSITORY, RECTAL RECTAL
Status: DISCONTINUED | OUTPATIENT
Start: 2023-11-26 | End: 2023-11-29

## 2023-11-26 RX ORDER — MORPHINE SULFATE 2 MG/ML
1 INJECTION, SOLUTION INTRAMUSCULAR; INTRAVENOUS EVERY 2 HOUR PRN
Status: DISCONTINUED | OUTPATIENT
Start: 2023-11-26 | End: 2023-11-29

## 2023-11-26 RX ORDER — POLYETHYLENE GLYCOL 3350 17 G/17G
17 POWDER, FOR SOLUTION ORAL DAILY PRN
Status: DISCONTINUED | OUTPATIENT
Start: 2023-11-26 | End: 2023-11-29

## 2023-11-26 RX ORDER — BUDESONIDE 0.5 MG/2ML
0.5 INHALANT ORAL
Status: DISCONTINUED | OUTPATIENT
Start: 2023-11-26 | End: 2023-11-29

## 2023-11-26 RX ORDER — MORPHINE SULFATE 2 MG/ML
2 INJECTION, SOLUTION INTRAMUSCULAR; INTRAVENOUS ONCE
Status: COMPLETED | OUTPATIENT
Start: 2023-11-26 | End: 2023-11-26

## 2023-11-26 RX ORDER — HYDRALAZINE HYDROCHLORIDE 25 MG/1
25 TABLET, FILM COATED ORAL EVERY 8 HOURS PRN
Status: DISCONTINUED | OUTPATIENT
Start: 2023-11-26 | End: 2023-11-29

## 2023-11-26 RX ORDER — VANCOMYCIN HYDROCHLORIDE 125 MG/1
125 CAPSULE ORAL DAILY
Status: DISCONTINUED | OUTPATIENT
Start: 2023-11-26 | End: 2023-11-29

## 2023-11-26 RX ORDER — MELATONIN
3 NIGHTLY PRN
Status: DISCONTINUED | OUTPATIENT
Start: 2023-11-26 | End: 2023-11-29

## 2023-11-26 NOTE — PROGRESS NOTES
ED Antibiotic Dose Adjustment by Pharmacy    vancomycin (VANCOCIN) 2 g x1 dose has been ordered. Pharmacy has adjusted the dose to vancomycin (VANCOCIN) 1.25 g x1 per hospital protocol. piperacillin/tazobactam (ZOSYN) 3.375 g x1 dose has been ordered. Pharmacy has adjusted the dose to piperacillin/tazobactam (ZOSYN) 4.5 g x1 per hospital protocol.     Clover Otero, PharmD  Clinical Pharmacist Specialist- Emergency Medicine  [facility]  [phone]

## 2023-11-26 NOTE — PROGRESS NOTES
NURSING ADMISSION NOTE      Patient admitted via Cart  Oriented to room. Safety precautions initiated. Bed in low position. Call light in reach. Pt is a&ox4. RA sating WNL. NSR, SB on tele. Elevated bp's- MD aware. Voids per BRP, up SBA. Safety precautions in place. Pt refusing safety precautions despite nursing education for bed alarm etc. Per MD's order pt is NPO, pt is non-compliant with npo status pt and wife educated, water removed from room. Pt c/o pain offered PRN meds see MAR pt refused and only wanting morphine- MD notified. Pt and wife updated on poc. No further needs at this time. GI and oncology consulted.

## 2023-11-26 NOTE — ED QUICK NOTES
Orders for admission, patient is aware of plan and ready to go upstairs. Any questions, please call ED RN darryl at extension [phone].      Patient Covid vaccination status: Fully vaccinated     COVID Test Ordered in ED: SARS-CoV-2/Flu A and B/RSV by PCR (GeneXpert)    COVID Suspicion at Admission: N/A    Running Infusions:  see mar    Mental Status/LOC at time of transport: a/ox3    Other pertinent information:   CIWA score: N/A   NIH score:  N/A

## 2023-11-26 NOTE — PROGRESS NOTES
Pt. To [place]. Reports has been in and out of hospitals all month. Reports has had a cough for 3+ week. Was in [place] for pneumonia, feels not much better. Having chest congestion, abnormal breathing noises and some SOB. Reports always fatigued due  to chemo. Reports temp from 97-99. Reports has had recent permenant esophageal fistula placed. Pt. With VSS. Lungs coarse, rales, some wheezing. Pt. Able to speak in complete sentences, but cough occasionally with wet/hoarse sounds. I discussed limitations of [facility]. Pt. Needs to go to ED given complicated hx and symptoms currently. Drove himself here and would like to drive self to ED. Declines EMS as they will not take to [facility]. Pt. Axox3. Placed on ED board.

## 2023-11-26 NOTE — ED INITIAL ASSESSMENT (HPI)
Patient to ED with c/o increased right sided abdominal pain and increased cough. Patient recently had permanent stent placed at [facility] for pancreatic and esophageal cancer. Patient states he feels SOB and pain with breathing.

## 2023-11-27 ENCOUNTER — APPOINTMENT (OUTPATIENT)
Dept: CT IMAGING | Facility: HOSPITAL | Age: 53
DRG: 982 | End: 2023-11-27
Attending: NURSE PRACTITIONER
Payer: COMMERCIAL

## 2023-11-27 ENCOUNTER — APPOINTMENT (OUTPATIENT)
Dept: HEMATOLOGY/ONCOLOGY | Age: 53
End: 2023-11-27
Attending: INTERNAL MEDICINE
Payer: COMMERCIAL

## 2023-11-27 PROBLEM — J69.0 ASPIRATION PNEUMONIA OF BOTH LUNGS DUE TO GASTRIC SECRETIONS (HCC): Status: ACTIVE | Noted: 2023-01-01

## 2023-11-27 PROBLEM — J69.0 ASPIRATION PNEUMONIA OF BOTH LUNGS DUE TO GASTRIC SECRETIONS (HCC): Status: ACTIVE | Noted: 2023-11-06

## 2023-11-27 LAB
ALBUMIN SERPL-MCNC: 2.9 G/DL (ref 3.4–5)
ALBUMIN/GLOB SERPL: 0.7 {RATIO} (ref 1–2)
ALP LIVER SERPL-CCNC: 117 U/L
ALT SERPL-CCNC: 18 U/L
ANION GAP SERPL CALC-SCNC: 7 MMOL/L (ref 0–18)
AST SERPL-CCNC: 17 U/L (ref 15–37)
BASOPHILS # BLD AUTO: 0.07 X10(3) UL (ref 0–0.2)
BASOPHILS NFR BLD AUTO: 0.7 %
BILIRUB SERPL-MCNC: 0.6 MG/DL (ref 0.1–2)
BUN BLD-MCNC: 10 MG/DL (ref 9–23)
CALCIUM BLD-MCNC: 8.9 MG/DL (ref 8.5–10.1)
CHLORIDE SERPL-SCNC: 103 MMOL/L (ref 98–112)
CO2 SERPL-SCNC: 27 MMOL/L (ref 21–32)
CREAT BLD-MCNC: 0.68 MG/DL
CREAT BLD-MCNC: 0.68 MG/DL
EGFRCR SERPLBLD CKD-EPI 2021: 110 ML/MIN/1.73M2 (ref 60–?)
EGFRCR SERPLBLD CKD-EPI 2021: 110 ML/MIN/1.73M2 (ref 60–?)
EOSINOPHIL # BLD AUTO: 0.27 X10(3) UL (ref 0–0.7)
EOSINOPHIL NFR BLD AUTO: 2.8 %
ERYTHROCYTE [DISTWIDTH] IN BLOOD BY AUTOMATED COUNT: 12.4 %
GLOBULIN PLAS-MCNC: 4.4 G/DL (ref 2.8–4.4)
GLUCOSE BLD-MCNC: 109 MG/DL (ref 70–99)
HCT VFR BLD AUTO: 40.4 %
HGB BLD-MCNC: 12.9 G/DL
IMM GRANULOCYTES # BLD AUTO: 0.14 X10(3) UL (ref 0–1)
IMM GRANULOCYTES NFR BLD: 1.5 %
INR BLD: 1.2 (ref 0.8–1.2)
LYMPHOCYTES # BLD AUTO: 0.68 X10(3) UL (ref 1–4)
LYMPHOCYTES NFR BLD AUTO: 7.1 %
MAGNESIUM SERPL-MCNC: 1.6 MG/DL (ref 1.6–2.6)
MCH RBC QN AUTO: 31.5 PG (ref 26–34)
MCHC RBC AUTO-ENTMCNC: 31.9 G/DL (ref 31–37)
MCV RBC AUTO: 98.5 FL
MONOCYTES # BLD AUTO: 0.65 X10(3) UL (ref 0.1–1)
MONOCYTES NFR BLD AUTO: 6.8 %
NEUTROPHILS # BLD AUTO: 7.75 X10 (3) UL (ref 1.5–7.7)
NEUTROPHILS # BLD AUTO: 7.75 X10(3) UL (ref 1.5–7.7)
NEUTROPHILS NFR BLD AUTO: 81.1 %
OSMOLALITY SERPL CALC.SUM OF ELEC: 284 MOSM/KG (ref 275–295)
PHOSPHATE SERPL-MCNC: 3.5 MG/DL (ref 2.5–4.9)
PLATELET # BLD AUTO: 211 10(3)UL (ref 150–450)
POTASSIUM SERPL-SCNC: 3.9 MMOL/L (ref 3.5–5.1)
PROCALCITONIN SERPL-MCNC: 0.13 NG/ML (ref ?–0.16)
PROCALCITONIN SERPL-MCNC: 0.17 NG/ML (ref ?–0.16)
PROT SERPL-MCNC: 7.3 G/DL (ref 6.4–8.2)
PROTHROMBIN TIME: 15.3 SECONDS (ref 11.6–14.8)
RBC # BLD AUTO: 4.1 X10(6)UL
SODIUM SERPL-SCNC: 137 MMOL/L (ref 136–145)
WBC # BLD AUTO: 9.6 X10(3) UL (ref 4–11)

## 2023-11-27 PROCEDURE — 99232 SBSQ HOSP IP/OBS MODERATE 35: CPT | Performed by: INTERNAL MEDICINE

## 2023-11-27 PROCEDURE — 99232 SBSQ HOSP IP/OBS MODERATE 35: CPT | Performed by: STUDENT IN AN ORGANIZED HEALTH CARE EDUCATION/TRAINING PROGRAM

## 2023-11-27 PROCEDURE — 71250 CT THORAX DX C-: CPT | Performed by: NURSE PRACTITIONER

## 2023-11-27 RX ORDER — DIPHENHYDRAMINE HYDROCHLORIDE 50 MG/ML
25 INJECTION INTRAMUSCULAR; INTRAVENOUS EVERY 6 HOURS PRN
Status: DISCONTINUED | OUTPATIENT
Start: 2023-11-27 | End: 2023-11-29

## 2023-11-27 RX ORDER — MAGNESIUM SULFATE HEPTAHYDRATE 40 MG/ML
2 INJECTION, SOLUTION INTRAVENOUS ONCE
Status: COMPLETED | OUTPATIENT
Start: 2023-11-27 | End: 2023-11-27

## 2023-11-27 NOTE — PLAN OF CARE
Received patient awake and oriented. On room air, wheezing noted in lung fields and with a congested cough and given Robitussin per request. On tele, SB/SR. Stated pain level always a 7 or greater and refused any pain medication except Morphine. Up to the bathroom independently, steady on feet.

## 2023-11-27 NOTE — SLP NOTE
ADULT SWALLOWING EVALUATION    ASSESSMENT    ASSESSMENT/OVERALL IMPRESSION:    Alyssa Pollock is a 46year old male with PMHx CAD s/p CABG, HTN, HLD, pancreatic and mestatic esophageal carcinoma s/p chemo/radiation and esphogectomy with gastric pull through in June, 2022. He presents for recurrent cough and new diagnosis of PNA of both lower lobes. Patient reports that he follows reflux precautions. Orders were received for a bedside swallowing evaluation as patient with coughing with liquids. Contacted patient at the bedside. He was awake and alert sitting up in bed willing to participate in assessment. Oral motor mechanism exam revealed functional oral range, rate, and strength of oral musculature, intact dentition, and clear vocal quality. Strong cough on command. Assessed patient with thin liquids via spoon, cup, mildly thick by cup, puree, and solids. Oral phase revealed functional oral acceptance, retrieval and mastication of solids with timely and complete clearing of the oral cavity. Pharyngeal phase revealed an apparent timely initiation of the pharyngeal phase with functional hyolaryngeal elevation on palpation. Intermittent delayed cough after the swallow with thin liquids. Patient presents with functional oral phase and suspect impaired pharyngeal phase. Videofluoroscopic swallow study (VFSS) suggested to assess oropharyngeal swallow function, r/o aspiration, assess compensatory strategies to optimize safe swallowing and recommend safest and least restrictive diet. Recommend regular diet with thin liquids with strict adherence to aspiration and reflux precautions with patient able to return demonstration of precautions. RECOMMENDATIONS   Diet Recommendations - Solids: Regular  Diet Recommendations - Liquids: Thin Liquids                        Compensatory Strategies Recommended: Slow rate;Small bites and sips  Aspiration Precautions: Upright position; Slow rate;Small bites and sips; No straw; 6 small meals throughout the day; upright and midline to 90* during meals and for 30 minutes after meal.   Medication Administration Recommendations: One pill at a time  Treatment Plan/Recommendations: Videofluoroscopic swallow study;Aspiration precautions (Reflux precautions)  Discharge Recommendations/Plan: Home    HISTORY   MEDICAL HISTORY  Reason for Referral: RN dysphagia screen;R/O aspiration    Problem List  Principal Problem:    Pneumonia of both lower lobes due to infectious organism  Active Problems:    Primary hypertension    Coronary artery disease involving coronary bypass graft of native heart    Esophageal carcinoma ([address]    Malignant neoplasm of pancreas ([address]    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 difficile diarrhea 10/23/2022    pt treated and without symptoms    Chest pain 02/01/2022    Coronary artery disease     On 8/16/13: CABG x 4 with LIMA to LAD and SVG to diagonal, OM and PDA    Decorative tattoo 01/01/2000    Depression     Esophageal cancer ([address]     Essential hypertension     Exposure to medical diagnostic radiation     last tx 8/18/2022    Frequent urination 01/01/2013    Gastroparesis     Heartburn 02/01/2022    High blood pressure     High cholesterol 08/01/2013    Found when I had quadruple bypass    History of COVID-19 10/16/2022    asymptomatic - pt was dx during a hospitalization for another diagnosis.  No continued symptoms    History of stomach ulcers     Hyperlipidemia     Hyperlipidemia LDL goal < 70     Indigestion 02/01/2022    Morbid obesity with BMI of 40.0-44.9, adult ([address]     Nontoxic multinodular goiter     Dx in 8/2013: pt was told that imaging showed thyroid cysts per PCP    Peripheral vascular disease ([address]     pt denies    Personal history of antineoplastic chemotherapy last chemo 8/2022; immunotherapy every other week - next treatment 4/03/2023    Problems with swallowing 02/01/2022    Pulmonary nodules     Dx in 8/2013: CT chest showed small bilateral fissural-based lung nodules less than 1 cm    S/P CABG x 4     On 8/16/13: CABG x 4 with LIMA to LAD and SVG to diagonal, OM and PDA    Shortness of breath     in past - when alking up stairs. Not current    Vomiting 02/01/2022       Prior Living Situation: Home with spouse  Diet Prior to Admission: Regular; Thin liquids  Precautions: Aspiration    Patient/Family Goals: To eat and drink    SWALLOWING HISTORY  Current Diet Consistency: NPO  Dysphagia History: History in 2022 following esophageal surgery. Imaging Results:   CONCLUSION:  Patchy opacity at the right lung base could represent atelectasis or pneumonia with a small right pleural effusion. SUBJECTIVE       OBJECTIVE   ORAL MOTOR EXAMINATION  Dentition: Natural;Functional  Symmetry: Within Functional Limits  Strength: Within Functional Limits  Tone: Within Functional Limits  Range of Motion: Within Functional Limits  Rate of Motion: Within Functional Limits    Voice Quality: Clear  Respiratory Status: Unlabored  Consistencies Trialed: Thin liquids;Puree;Hard solid; Nectar thick liquids  Method of Presentation: Self presentation;Spoon;Cup;Single sips  Patient Positioning: Upright;Midline (on edge of bed)    Oral Phase of Swallow: Within Functional Limits                      Pharyngeal Phase of Swallow: Impaired  Laryngeal Elevation Timing: Appears intact  Laryngeal Elevation Strength: Appears intact  Laryngeal Elevation Coordination: Appears impaired  (Please note: Silent aspiration cannot be evaluated clinically.  Videofluoroscopic Swallow Study is required to rule-out silent aspiration.)    Esophageal Phase of Swallow: No complaints consistent with possible esophageal involvement  Comments:               GOALS  Goal #1 The patient will tolerate regular consistency and thin liquids without overt signs or symptoms of aspiration with 95 % accuracy over 1 session(s). In Progress   Goal #2 Pt will participate in VFSS to objectively assess swallow function, r/o aspiration and determine safest/least restrictive means of nutrition/hydration.      Not Addressed     FOLLOW UP  Treatment Plan/Recommendations: Videofluoroscopic swallow study;Aspiration precautions (Reflux precautions)  Number of Visits to Meet Established Goals: 3  Follow Up Needed (Documentation Required): Yes  SLP Follow-up Date: 11/27/23    Thank you for your referral.   If you have any questions, please contact DAKSHA Nation

## 2023-11-27 NOTE — PROGRESS NOTES
Pharmacy Dosing Service  Initial Pharmacokinetic Consult for Vancomycin Dosing          Sowmya De La Cruz. is a 46year old male who is being treated for pneumonia. The initial treatment and monitoring approach will be steady state AUC strategy. Pharmacy has been asked to dose vancomycin by Dr. Sally Joy. Other current anti-infective medication(s):    Zosyn (11/26-  PO vancomycin for C diff ppx      Est CrCl: >100 mL/min      Pharmacokinetic target:  to 600 mg-h/L and trough <=15 mg/L        A/P:  1.  Start vancomycin 1250mg (~15mg/kg) q12hr based upon actual BW and estimated renal function. 2.  Plan for vancomycin peak and trough to be obtained at steady state    3. Will monitor SCr daily while on vancomycin to assess renal function. Pharmacy will continue to follow him and adjust dosing as appropriate.         Unique Kapoor PharmD, BCPS  11/26/2023  [address] [place] [address] Extension: [phone]

## 2023-11-27 NOTE — PLAN OF CARE
Received patient on room air, saturating well. VSS. Medications administered per the [facility] and patient needs addressed. Pain medication administered per patient complaint. IVF infusing at MD order. Patient is resting in bed, at lowest position with bed rails up and call light within reach.

## 2023-11-27 NOTE — PROGRESS NOTES
Patient Name: Brett Wu 46 yrs  YOB: 1970  Medical Record Number: [id number]  CSN: [id number]  Date of Admission: 11/26/2023  Attending Physician: Dr. Brandie Bowers covered by Dr. Wendi Cameron  Reason for Consult: Metastatic pancreatic and esophageal cancer      The 21st Century Cures Act makes medical notes like these available to patients in the interest of transparency. Please be advised this is a medical document. Medical documents are intended to carry relevant information, facts as evident, and the clinical opinion of the practitioner. The medical note is intended as peer to peer communication and may appear blunt or direct. It is written in medical language and may contain abbreviations or verbiage that are unfamiliar. Reviewed patient's chart, results of CT Chest noted with nodules. Patient follows with Dr. Kal Yañez and has a plan for outpatient PET scan, which will be beneficial in discerning the lung nodules, we will continue with plan for this to be completed after discharge. No acute intervention in setting of pneumonia concerns at this time. Discussed with Dr. Lisset Crisostomo regarding fistula and will defer recommendations for this to GI and pulmonology who have been consulted on this patient's case. Oncology will follow peripherally during the admission but please to not hesitate to reach out with any additional questions, concerns, or input should the need arise. All of the above was discussed with Dr. Lisset Crisostomo who was in agreement with the plan.      Electronically Signed by:    Sweta Pires DNP, [facility]  Nurse Practitioner  Hematology and Oncology

## 2023-11-28 ENCOUNTER — ANESTHESIA (OUTPATIENT)
Dept: ENDOSCOPY | Facility: HOSPITAL | Age: 53
DRG: 982 | End: 2023-11-28
Payer: COMMERCIAL

## 2023-11-28 ENCOUNTER — ANESTHESIA EVENT (OUTPATIENT)
Dept: ENDOSCOPY | Facility: HOSPITAL | Age: 53
DRG: 982 | End: 2023-11-28
Payer: COMMERCIAL

## 2023-11-28 ENCOUNTER — APPOINTMENT (OUTPATIENT)
Dept: GENERAL RADIOLOGY | Facility: HOSPITAL | Age: 53
DRG: 982 | End: 2023-11-28
Attending: INTERNAL MEDICINE
Payer: COMMERCIAL

## 2023-11-28 LAB
CREAT BLD-MCNC: 0.61 MG/DL
EGFRCR SERPLBLD CKD-EPI 2021: 114 ML/MIN/1.73M2 (ref 60–?)
MAGNESIUM SERPL-MCNC: 2 MG/DL (ref 1.6–2.6)
VANCOMYCIN PEAK SERPL-MCNC: 22.9 UG/ML (ref 30–50)

## 2023-11-28 PROCEDURE — 74360 X-RAY GUIDE GI DILATION: CPT | Performed by: INTERNAL MEDICINE

## 2023-11-28 PROCEDURE — 0DW58DZ REVISION OF INTRALUMINAL DEVICE IN ESOPHAGUS, VIA NATURAL OR ARTIFICIAL OPENING ENDOSCOPIC: ICD-10-PCS | Performed by: INTERNAL MEDICINE

## 2023-11-28 PROCEDURE — 99232 SBSQ HOSP IP/OBS MODERATE 35: CPT | Performed by: STUDENT IN AN ORGANIZED HEALTH CARE EDUCATION/TRAINING PROGRAM

## 2023-11-28 PROCEDURE — 99232 SBSQ HOSP IP/OBS MODERATE 35: CPT | Performed by: INTERNAL MEDICINE

## 2023-11-28 RX ORDER — LIDOCAINE HYDROCHLORIDE 10 MG/ML
INJECTION, SOLUTION EPIDURAL; INFILTRATION; INTRACAUDAL; PERINEURAL AS NEEDED
Status: DISCONTINUED | OUTPATIENT
Start: 2023-11-28 | End: 2023-11-28 | Stop reason: SURG

## 2023-11-28 RX ADMIN — LIDOCAINE HYDROCHLORIDE 100 MG: 10 INJECTION, SOLUTION EPIDURAL; INFILTRATION; INTRACAUDAL; PERINEURAL at 14:35:00

## 2023-11-28 NOTE — ANESTHESIA POSTPROCEDURE EVALUATION
[address] Patient Status:  Inpatient   Age/Gender 46year old male MRN [id number]   Location [address] [address] Attending Glen Martinez 91 Day # 2 PCP Kiah Silva MD       Anesthesia Post-op Note    ESOPHAGOGASTRODUODENOSCOPY (EGD) WITH ESOPHAGEAL STENT REVISION AND SUTURING    Procedure Summary       Date: 11/28/23 Room / Location: [facility] ENDOSCOPY 01 / [facility] ENDOSCOPY    Anesthesia Start: 1430 Anesthesia Stop: 8963    Procedure: ESOPHAGOGASTRODUODENOSCOPY (EGD) WITH ESOPHAGEAL STENT REVISION AND SUTURING Diagnosis: (BRONCHOESOPHAGEAL FISTULA)    Surgeons: Trinity Cruz MD Anesthesiologist: Lani Barroso MD    Anesthesia Type: MAC ASA Status: 3            Anesthesia Type: MAC    Vitals Value Taken Time   /79 11/28/23 1538   Temp 98 11/28/23 1558   Pulse 58 11/28/23 1542   Resp 15 11/28/23 1535   SpO2 97 % 11/28/23 1542   Vitals shown include unfiled device data. Patient Location: Endoscopy    Anesthesia Type: MAC    Airway Patency: patent and extubated    Postop Pain Control: adequate    Mental Status: mildly sedated but able to meaningfully participate in the post-anesthesia evaluation    Nausea/Vomiting: none    Cardiopulmonary/Hydration status: stable euvolemic    Complications: no apparent anesthesia related complications    Postop vital signs: stable    Dental Exam: Unchanged from Preop    Patient to be discharged from PACU when criteria met.

## 2023-11-28 NOTE — SLP NOTE
Videofluoroscopic swallowing study is on hold until EGD with esophageal stent revision is completed. Patient is Strict NPO until completed. Will follow up with VFSS when medically appropriate.

## 2023-11-28 NOTE — PLAN OF CARE
AO x 4, on room air satting >90%, getting nebs, Q4 vitals, electrolyte non cardiac protocol, voids, completed EGD today see notes, advanced to low fiber diet, morphine Q2 to manage pain, patient spouse at bedside prior to and after procedure. , jaycee Dc'd today, getting IV zosyn, LR @ 100mL/hr, up ad tito, call light in reach, bed in low locked position, rounded on routinely, no further needs this shift. Problem: PAIN - ADULT  Goal: Verbalizes/displays adequate comfort level or patient's stated pain goal  Description: INTERVENTIONS:  - Encourage pt to monitor pain and request assistance  - Assess pain using appropriate pain scale  - Administer analgesics based on type and severity of pain and evaluate response  - Implement non-pharmacological measures as appropriate and evaluate response  - Consider cultural and social influences on pain and pain management  - Manage/alleviate anxiety  - Utilize distraction and/or relaxation techniques  - Monitor for opioid side effects  - Notify MD/LIP if interventions unsuccessful or patient reports new pain  - Anticipate increased pain with activity and pre-medicate as appropriate  Outcome: Progressing     Problem: RISK FOR INFECTION - ADULT  Goal: Absence of fever/infection during anticipated neutropenic period  Description: INTERVENTIONS  - Monitor WBC  - Administer growth factors as ordered  - Implement neutropenic guidelines  Outcome: Progressing     Problem: SAFETY ADULT - FALL  Goal: Free from fall injury  Description: INTERVENTIONS:  - Assess pt frequently for physical needs  - Identify cognitive and physical deficits and behaviors that affect risk of falls.   - [place] fall precautions as indicated by assessment.  - Educate pt/family on patient safety including physical limitations  - Instruct pt to call for assistance with activity based on assessment  - Modify environment to reduce risk of injury  - Provide assistive devices as appropriate  - Consider OT/PT consult to assist with strengthening/mobility  - Encourage toileting schedule  Outcome: Progressing     Problem: DISCHARGE PLANNING  Goal: Discharge to home or other facility with appropriate resources  Description: INTERVENTIONS:  - Identify barriers to discharge w/pt and caregiver  - Include patient/family/discharge partner in discharge planning  - Arrange for needed discharge resources and transportation as appropriate  - Identify discharge learning needs (meds, wound care, etc)  - Arrange for interpreters to assist at discharge as needed  - Consider post-discharge preferences of patient/family/discharge partner  - Complete POLST form as appropriate  - Assess patient's ability to be responsible for managing their own health  - Refer to Case Management Department for coordinating discharge planning if the patient needs post-hospital services based on physician/LIP order or complex needs related to functional status, cognitive ability or social support system  Outcome: Progressing

## 2023-11-28 NOTE — PLAN OF CARE
Pt from home w/ spouse, plan to dc back home  Aox4  VSS on RA  afebrile  Refusing tele, q4 vitals if able  Electrolyte non-cardiac replacement  continent  Up @tito, call light within reach  Strict NPO, Video swallow to be done  Unit draw  Receiving IV [email or web address]/[email or web address] and [email or web address]  Pt updated on current POC, no questions at this time    Problem: PAIN - ADULT  Goal: Verbalizes/displays adequate comfort level or patient's stated pain goal  Description: INTERVENTIONS:  - Encourage pt to monitor pain and request assistance  - Assess pain using appropriate pain scale  - Administer analgesics based on type and severity of pain and evaluate response  - Implement non-pharmacological measures as appropriate and evaluate response  - Consider cultural and social influences on pain and pain management  - Manage/alleviate anxiety  - Utilize distraction and/or relaxation techniques  - Monitor for opioid side effects  - Notify MD/LIP if interventions unsuccessful or patient reports new pain  - Anticipate increased pain with activity and pre-medicate as appropriate  Outcome: Progressing     Problem: RISK FOR INFECTION - ADULT  Goal: Absence of fever/infection during anticipated neutropenic period  Description: INTERVENTIONS  - Monitor WBC  - Administer growth factors as ordered  - Implement neutropenic guidelines  Outcome: Progressing     Problem: SAFETY ADULT - FALL  Goal: Free from fall injury  Description: INTERVENTIONS:  - Assess pt frequently for physical needs  - Identify cognitive and physical deficits and behaviors that affect risk of falls.   - [place] fall precautions as indicated by assessment.  - Educate pt/family on patient safety including physical limitations  - Instruct pt to call for assistance with activity based on assessment  - Modify environment to reduce risk of injury  - Provide assistive devices as appropriate  - Consider OT/PT consult to assist with strengthening/mobility  - Encourage toileting schedule  Outcome: Progressing     Problem: DISCHARGE PLANNING  Goal: Discharge to home or other facility with appropriate resources  Description: INTERVENTIONS:  - Identify barriers to discharge w/pt and caregiver  - Include patient/family/discharge partner in discharge planning  - Arrange for needed discharge resources and transportation as appropriate  - Identify discharge learning needs (meds, wound care, etc)  - Arrange for interpreters to assist at discharge as needed  - Consider post-discharge preferences of patient/family/discharge partner  - Complete POLST form as appropriate  - Assess patient's ability to be responsible for managing their own health  - Refer to Case Management Department for coordinating discharge planning if the patient needs post-hospital services based on physician/LIP order or complex needs related to functional status, cognitive ability or social support system  Outcome: Progressing

## 2023-11-29 ENCOUNTER — TELEPHONE (OUTPATIENT)
Dept: HEMATOLOGY/ONCOLOGY | Age: 53
End: 2023-11-29

## 2023-11-29 VITALS
DIASTOLIC BLOOD PRESSURE: 89 MMHG | HEART RATE: 63 BPM | TEMPERATURE: 98 F | SYSTOLIC BLOOD PRESSURE: 151 MMHG | WEIGHT: 187.38 LBS | OXYGEN SATURATION: 95 % | BODY MASS INDEX: 24 KG/M2 | RESPIRATION RATE: 16 BRPM

## 2023-11-29 PROCEDURE — 99232 SBSQ HOSP IP/OBS MODERATE 35: CPT | Performed by: INTERNAL MEDICINE

## 2023-11-29 NOTE — SLP NOTE
Attempted to see for follow up however patient left AMA. Will d/c from services at this time.      [place], [address] [facility]  Pager

## 2023-11-29 NOTE — PLAN OF CARE
Pt from home w/ spouse, plan to dc back home  Aox4  VSS on RA  afebrile  Refusing tele, q4 vitals if able  Electrolyte non-cardiac replacement  continent  Up @tito, call light within reach  Strict NPO, Video swallow to be done  Unit draw  Receiving IV [email or web address] and [email or web address]  Pt updated on current POC, no questions at this time    Problem: PAIN - ADULT  Goal: Verbalizes/displays adequate comfort level or patient's stated pain goal  Description: INTERVENTIONS:  - Encourage pt to monitor pain and request assistance  - Assess pain using appropriate pain scale  - Administer analgesics based on type and severity of pain and evaluate response  - Implement non-pharmacological measures as appropriate and evaluate response  - Consider cultural and social influences on pain and pain management  - Manage/alleviate anxiety  - Utilize distraction and/or relaxation techniques  - Monitor for opioid side effects  - Notify MD/LIP if interventions unsuccessful or patient reports new pain  - Anticipate increased pain with activity and pre-medicate as appropriate  Outcome: Progressing     Problem: RISK FOR INFECTION - ADULT  Goal: Absence of fever/infection during anticipated neutropenic period  Description: INTERVENTIONS  - Monitor WBC  - Administer growth factors as ordered  - Implement neutropenic guidelines  Outcome: Progressing     Problem: SAFETY ADULT - FALL  Goal: Free from fall injury  Description: INTERVENTIONS:  - Assess pt frequently for physical needs  - Identify cognitive and physical deficits and behaviors that affect risk of falls.   - [place] fall precautions as indicated by assessment.  - Educate pt/family on patient safety including physical limitations  - Instruct pt to call for assistance with activity based on assessment  - Modify environment to reduce risk of injury  - Provide assistive devices as appropriate  - Consider OT/PT consult to assist with strengthening/mobility  - Encourage toileting schedule  Outcome: Progressing     Problem: DISCHARGE PLANNING  Goal: Discharge to home or other facility with appropriate resources  Description: INTERVENTIONS:  - Identify barriers to discharge w/pt and caregiver  - Include patient/family/discharge partner in discharge planning  - Arrange for needed discharge resources and transportation as appropriate  - Identify discharge learning needs (meds, wound care, etc)  - Arrange for interpreters to assist at discharge as needed  - Consider post-discharge preferences of patient/family/discharge partner  - Complete POLST form as appropriate  - Assess patient's ability to be responsible for managing their own health  - Refer to Case Management Department for coordinating discharge planning if the patient needs post-hospital services based on physician/LIP order or complex needs related to functional status, cognitive ability or social support system  Outcome: Progressing

## 2023-11-29 NOTE — PROGRESS NOTES
Patient left AMA, did not want to wait for hospitalist, patient deaccessed port himself, signed form and left.

## 2023-11-29 NOTE — TELEPHONE ENCOUNTER
Patient needs a new letter to be off of work. Patient is inpatient right now. He said the letter can go to [address] [place] [address] [phone]. Thank you.

## 2023-12-04 ENCOUNTER — OFFICE VISIT (OUTPATIENT)
Dept: HEMATOLOGY/ONCOLOGY | Age: 53
End: 2023-12-04
Attending: INTERNAL MEDICINE
Payer: COMMERCIAL

## 2023-12-04 ENCOUNTER — SOCIAL WORK SERVICES (OUTPATIENT)
Dept: HEMATOLOGY/ONCOLOGY | Facility: HOSPITAL | Age: 53
End: 2023-12-04

## 2023-12-04 VITALS
RESPIRATION RATE: 16 BRPM | WEIGHT: 182 LBS | DIASTOLIC BLOOD PRESSURE: 103 MMHG | HEIGHT: 73.82 IN | TEMPERATURE: 97 F | SYSTOLIC BLOOD PRESSURE: 170 MMHG | OXYGEN SATURATION: 96 % | HEART RATE: 66 BPM | BODY MASS INDEX: 23.36 KG/M2

## 2023-12-04 DIAGNOSIS — Z51.12 ENCOUNTER FOR ANTINEOPLASTIC IMMUNOTHERAPY: ICD-10-CM

## 2023-12-04 DIAGNOSIS — C15.5 MALIGNANT NEOPLASM OF LOWER THIRD OF ESOPHAGUS (HCC): Primary | ICD-10-CM

## 2023-12-04 DIAGNOSIS — R91.8 PULMONARY INFILTRATES: ICD-10-CM

## 2023-12-04 DIAGNOSIS — J69.0 ASPIRATION PNEUMONIA OF BOTH LUNGS DUE TO GASTRIC SECRETIONS, UNSPECIFIED PART OF LUNG (HCC): ICD-10-CM

## 2023-12-04 DIAGNOSIS — J86.0: ICD-10-CM

## 2023-12-04 DIAGNOSIS — K91.89 ESOPHAGEAL ANASTOMOTIC LEAK: ICD-10-CM

## 2023-12-04 LAB
ALBUMIN SERPL-MCNC: 3.2 G/DL (ref 3.4–5)
ALBUMIN/GLOB SERPL: 0.7 {RATIO} (ref 1–2)
ALP LIVER SERPL-CCNC: 114 U/L
ALT SERPL-CCNC: 24 U/L
ANION GAP SERPL CALC-SCNC: 6 MMOL/L (ref 0–18)
AST SERPL-CCNC: 22 U/L (ref 15–37)
BASOPHILS # BLD AUTO: 0.04 X10(3) UL (ref 0–0.2)
BASOPHILS NFR BLD AUTO: 0.6 %
BILIRUB SERPL-MCNC: 0.4 MG/DL (ref 0.1–2)
BUN BLD-MCNC: 13 MG/DL (ref 9–23)
CALCIUM BLD-MCNC: 9.2 MG/DL (ref 8.5–10.1)
CANCER AG19-9 SERPL-ACNC: 82.9 U/ML (ref ?–37)
CHLORIDE SERPL-SCNC: 107 MMOL/L (ref 98–112)
CO2 SERPL-SCNC: 27 MMOL/L (ref 21–32)
CREAT BLD-MCNC: 0.68 MG/DL
EGFRCR SERPLBLD CKD-EPI 2021: 110 ML/MIN/1.73M2 (ref 60–?)
EOSINOPHIL # BLD AUTO: 0.12 X10(3) UL (ref 0–0.7)
EOSINOPHIL NFR BLD AUTO: 1.8 %
ERYTHROCYTE [DISTWIDTH] IN BLOOD BY AUTOMATED COUNT: 13.2 %
GLOBULIN PLAS-MCNC: 4.3 G/DL (ref 2.8–4.4)
GLUCOSE BLD-MCNC: 123 MG/DL (ref 70–99)
HCT VFR BLD AUTO: 38.9 %
HGB BLD-MCNC: 12.5 G/DL
IMM GRANULOCYTES # BLD AUTO: 0.08 X10(3) UL (ref 0–1)
IMM GRANULOCYTES NFR BLD: 1.2 %
LYMPHOCYTES # BLD AUTO: 0.62 X10(3) UL (ref 1–4)
LYMPHOCYTES NFR BLD AUTO: 9.4 %
MCH RBC QN AUTO: 31.6 PG (ref 26–34)
MCHC RBC AUTO-ENTMCNC: 32.1 G/DL (ref 31–37)
MCV RBC AUTO: 98.2 FL
MONOCYTES # BLD AUTO: 0.62 X10(3) UL (ref 0.1–1)
MONOCYTES NFR BLD AUTO: 9.4 %
NEUTROPHILS # BLD AUTO: 5.13 X10 (3) UL (ref 1.5–7.7)
NEUTROPHILS # BLD AUTO: 5.13 X10(3) UL (ref 1.5–7.7)
NEUTROPHILS NFR BLD AUTO: 77.6 %
OSMOLALITY SERPL CALC.SUM OF ELEC: 291 MOSM/KG (ref 275–295)
PLATELET # BLD AUTO: 203 10(3)UL (ref 150–450)
POTASSIUM SERPL-SCNC: 4 MMOL/L (ref 3.5–5.1)
PROT SERPL-MCNC: 7.5 G/DL (ref 6.4–8.2)
RBC # BLD AUTO: 3.96 X10(6)UL
SODIUM SERPL-SCNC: 140 MMOL/L (ref 136–145)
T4 FREE SERPL-MCNC: 0.9 NG/DL (ref 0.8–1.7)
TSI SER-ACNC: 1.94 MIU/ML (ref 0.36–3.74)
WBC # BLD AUTO: 6.6 X10(3) UL (ref 4–11)

## 2023-12-04 PROCEDURE — 96413 CHEMO IV INFUSION 1 HR: CPT

## 2023-12-04 PROCEDURE — 96417 CHEMO IV INFUS EACH ADDL SEQ: CPT

## 2023-12-04 PROCEDURE — 84439 ASSAY OF FREE THYROXINE: CPT

## 2023-12-04 PROCEDURE — 85025 COMPLETE CBC W/AUTO DIFF WBC: CPT

## 2023-12-04 PROCEDURE — 84443 ASSAY THYROID STIM HORMONE: CPT

## 2023-12-04 PROCEDURE — 80053 COMPREHEN METABOLIC PANEL: CPT

## 2023-12-04 PROCEDURE — 86301 IMMUNOASSAY TUMOR CA 19-9: CPT

## 2023-12-04 PROCEDURE — 99215 OFFICE O/P EST HI 40 MIN: CPT | Performed by: NURSE PRACTITIONER

## 2023-12-04 RX ORDER — BUDESONIDE 0.5 MG/2ML
0.5 INHALANT ORAL 2 TIMES DAILY
Qty: 56 ML | Refills: 0 | Status: SHIPPED | OUTPATIENT
Start: 2023-12-04

## 2023-12-04 RX ORDER — IPRATROPIUM BROMIDE AND ALBUTEROL SULFATE 2.5; .5 MG/3ML; MG/3ML
3 SOLUTION RESPIRATORY (INHALATION) EVERY 6 HOURS PRN
Qty: 168 ML | Refills: 1 | Status: SHIPPED | OUTPATIENT
Start: 2023-12-04

## 2023-12-04 NOTE — PROGRESS NOTES
Nutrition F/U Consultation     Patient Name: John Warren. YOB: 1970  Medical Record Number: [id number]      Account Number: [de-identified]  Dietitian: Tera Ahmadi RD, RADHA     Date of visit: 12/4//2023     Diet Rx: po as tolerated (post-esophagectomy); soft/moist as per esophageal stent placement (     Pertinent Dx/PMH: Esophageal cancer; pancreatic (head) cancer     TX:  Switched to Herceptin/Immunotherapy maintenance on 9/25/23 due to negative signatera; q2 week 5-FU + Herceptin + q6 week Keytruda. Oxaliplatin was held on C7 due to neuropathy      1. Neoadjuvant chemoRT with carbo-taxol: 7/6/22-8/10/22  2. laparoscopic robotic-assisted esophagogastrectomy with feeding jejunostomy tube placement on 9/30/2022. Complications w/ esophageal leak  3. G-POEM (EGD and pylorectomy) 12/27/22     Other pertinent subjective/objective information: recent hospitalization 11/26-28 as per Esophagobronchial fistula, prior esophageal and bronchial stents, with improvement and later recurrence of symptoms, esophageal stent dislodgement per CT (EEH);  diet/wt/medical hx; s/p bronchoscopy and stenting 11/20/2023 at [facility]; 11/12/23 anastomosis leak and esophageal fistula tract opening on one of the ulcer bases      12/4/23: Pt meeting definition for SEVERE MALNUTRITION in the context of acute illness as evidenced by 12% unplanned wt loss x 4 weeks and visible signs of lean and fat tissue loss.       Pertinent Meds:   Pancrelipase, Lip-Prot-Amyl, (CREON) 05031-91302 units Oral Cap DR Particles 240 capsule 0 10/30/2023    Sig:   Take 2 capsules with meals and 1 capsule with snacks     Route:   (none)     Order #:   [id number]          Pertinent Labs: noted     Height: 6'2\"                IBW: 190 +/- 10%     WT HX:   12/04/23: 182 lbs  11/06/23: 200 lbs  10/09/23: 206 lbs 8 oz  09/25/23: 206 lbs 8 oz  08/28/23: 219 lbs  08/07/23: 222 lbs  07/24/23: 226 lbs 8 oz  07/10/23: 231 lbs  06/05/23: 234 lbs  05/01/23: 244 lbs  04/03/23: 251 lbs  03/06/23: 246 lbs  02/20/23: 244 lbs  02/06/23: 239 lbs 8 oz     Estimated Nutrition Needs:   kcals/kg = 2390-1202 KCALS/d; 1.5 gms protein/kg = 124 gms/d     Assessment/Plan: RD f/u w/ pt tx room today. Pt has been in/out of hospital the entire month of November; as per pt, this has contributed to ~24 lb unplanned wt loss. Pt noted being ix by surgical team to consume only tender/moist foods avoiding dry, greasy, hard, spicy foods until healing of stent. Pt continues to c/o early satiety but does note hunger. He fills after 1.5 small tacos. He did note eating an entire box of Velveeta mac/cheese and tolerated well. He continues to take CREON as rx'd taking 1 pill at first bite and another 1/2 way through meal. Stools continue lite to medium brown in color; pt denies oil droplets in toilet bowl. RD offered support/encouragement noting goal calorie intake of ~2500 kcals/d. The [facility] makes medical notes like these available to patients in the interest of transparency. Please be advised this is a medical document. Medical documents are intended to carry relevant information, facts as evident, and the clinical opinion of the practitioner. The medical note is intended as peer to peer communication and may appear blunt or direct. It is written in medical language and may contain abbreviations or verbiage that are unfamiliar.

## 2023-12-04 NOTE — PROGRESS NOTES
Pt here for C12D1 Drug(s)her/keytruda. Arrives Ambulating independently, accompanied by Self     Patient was evaluated today by Treatment Nurse and apn    Oral medications included in this regimen:  no    Patient confirms comprehension of cancer treatment schedule:  yes    Pregnancy screening:  Denies possibility of pregnancy    Modifications in dose or schedule:  No    Medications appearance and physical integrity checked by RN: yes.     Chemotherapy IV pump settings verified by 2 RNs:  na  Frequency of blood return and site check throughout administration: Prior to administration     Infusion/treatment outcome:  patient tolerated treatment without incident    Education Record    Learner:  Patient  Barriers / Limitations:  None  Method:  Brief focused  Education / instructions given:  chemo admin  Outcome:  Shows understanding    Discharged Home, Ambulating independently, accompanied by:Self    Patient/family verbalized understanding of future appointments: by [facility]

## 2023-12-04 NOTE — PROGRESS NOTES
SW met with pt to provide support and encouragement. Pt shared feelings and thoughts on recent hospitalizations. Pt is currently on long term disability but is hopeful to return to work at some point but is feeling discouraged when has changes in his condition. Support given and pt states he is feeling positive today. SW is completed LTD paperwork when received. Pt to call the [place] to clarify needs by the end of the month. Deyvi Jimenez LCSW   at [address], [address] [place] [address], [place], [address] [place] [address] Joe Perdomo  Ph: [phone]. [email or web address]  Fax: [phone]

## 2023-12-05 ENCOUNTER — PATIENT MESSAGE (OUTPATIENT)
Dept: HEMATOLOGY/ONCOLOGY | Age: 53
End: 2023-12-05

## 2023-12-05 DIAGNOSIS — Z51.12 ENCOUNTER FOR ANTINEOPLASTIC IMMUNOTHERAPY: Primary | ICD-10-CM

## 2023-12-05 NOTE — TELEPHONE ENCOUNTER
From: Olivier Gamez.   To: Rose Mcelroy  Sent: 12/5/2023 8:10 AM CST  Subject: Tutu Anderson     Do you have the information for the lung doctor do you want me to follow-up with please   And I will not see you before the holidays so happy holidays

## 2023-12-06 ENCOUNTER — SOCIAL WORK SERVICES (OUTPATIENT)
Dept: HEMATOLOGY/ONCOLOGY | Facility: HOSPITAL | Age: 53
End: 2023-12-06

## 2023-12-06 NOTE — PROGRESS NOTES
SW assisted with LTD paperwork. Completed paperwork was faxed to [facility] fax [phone]. MARIZA sent a copy of the confirmation and completed paperwork to pt via [employer] message. SHARON VillavicencioW   at [facility]    [address] [facility], [address] [place] [address], [facility], [address] Juan Jose  [address], [address] [place] [address] Conor  Ph: [phone]. [email or web address]  Fax: [phone]

## 2023-12-09 DIAGNOSIS — F33.1 MAJOR DEPRESSIVE DISORDER, RECURRENT, MODERATE (HCC): ICD-10-CM

## 2023-12-10 ENCOUNTER — APPOINTMENT (OUTPATIENT)
Dept: CT IMAGING | Facility: HOSPITAL | Age: 53
End: 2023-12-10
Attending: EMERGENCY MEDICINE
Payer: COMMERCIAL

## 2023-12-10 ENCOUNTER — HOSPITAL ENCOUNTER (INPATIENT)
Facility: HOSPITAL | Age: 53
LOS: 3 days | Discharge: HOME | End: 2023-12-13
Attending: EMERGENCY MEDICINE | Admitting: HOSPITALIST
Payer: COMMERCIAL

## 2023-12-10 DIAGNOSIS — K22.9 ESOPHAGEAL ABNORMALITY: ICD-10-CM

## 2023-12-10 DIAGNOSIS — R07.9 RIGHT-SIDED CHEST PAIN: ICD-10-CM

## 2023-12-10 DIAGNOSIS — C25.9 MALIGNANT NEOPLASM OF PANCREAS, UNSPECIFIED LOCATION OF MALIGNANCY (HCC): ICD-10-CM

## 2023-12-10 DIAGNOSIS — T85.528A MIGRATION OF ESOPHAGEAL STENT, INITIAL ENCOUNTER: Primary | ICD-10-CM

## 2023-12-10 LAB
ALBUMIN SERPL-MCNC: 3 G/DL (ref 3.4–5)
ALBUMIN/GLOB SERPL: 0.8 {RATIO} (ref 1–2)
ALP LIVER SERPL-CCNC: 120 U/L
ALT SERPL-CCNC: 34 U/L
ANION GAP SERPL CALC-SCNC: 5 MMOL/L (ref 0–18)
AST SERPL-CCNC: 28 U/L (ref 15–37)
BASOPHILS # BLD AUTO: 0.03 X10(3) UL (ref 0–0.2)
BASOPHILS NFR BLD AUTO: 0.4 %
BILIRUB SERPL-MCNC: 0.2 MG/DL (ref 0.1–2)
BUN BLD-MCNC: 14 MG/DL (ref 9–23)
CALCIUM BLD-MCNC: 8.8 MG/DL (ref 8.5–10.1)
CHLORIDE SERPL-SCNC: 109 MMOL/L (ref 98–112)
CO2 SERPL-SCNC: 27 MMOL/L (ref 21–32)
CREAT BLD-MCNC: 0.69 MG/DL
EGFRCR SERPLBLD CKD-EPI 2021: 110 ML/MIN/1.73M2 (ref 60–?)
EOSINOPHIL # BLD AUTO: 0.16 X10(3) UL (ref 0–0.7)
EOSINOPHIL NFR BLD AUTO: 2.4 %
ERYTHROCYTE [DISTWIDTH] IN BLOOD BY AUTOMATED COUNT: 13.5 %
GLOBULIN PLAS-MCNC: 3.7 G/DL (ref 2.8–4.4)
GLUCOSE BLD-MCNC: 128 MG/DL (ref 70–99)
HCT VFR BLD AUTO: 35 %
HGB BLD-MCNC: 11.3 G/DL
IMM GRANULOCYTES # BLD AUTO: 0.07 X10(3) UL (ref 0–1)
IMM GRANULOCYTES NFR BLD: 1 %
LYMPHOCYTES # BLD AUTO: 0.65 X10(3) UL (ref 1–4)
LYMPHOCYTES NFR BLD AUTO: 9.6 %
MCH RBC QN AUTO: 31 PG (ref 26–34)
MCHC RBC AUTO-ENTMCNC: 32.3 G/DL (ref 31–37)
MCV RBC AUTO: 95.9 FL
MONOCYTES # BLD AUTO: 0.56 X10(3) UL (ref 0.1–1)
MONOCYTES NFR BLD AUTO: 8.3 %
NEUTROPHILS # BLD AUTO: 5.29 X10 (3) UL (ref 1.5–7.7)
NEUTROPHILS # BLD AUTO: 5.29 X10(3) UL (ref 1.5–7.7)
NEUTROPHILS NFR BLD AUTO: 78.3 %
OSMOLALITY SERPL CALC.SUM OF ELEC: 294 MOSM/KG (ref 275–295)
PLATELET # BLD AUTO: 199 10(3)UL (ref 150–450)
POTASSIUM SERPL-SCNC: 3.9 MMOL/L (ref 3.5–5.1)
PROT SERPL-MCNC: 6.7 G/DL (ref 6.4–8.2)
RBC # BLD AUTO: 3.65 X10(6)UL
SARS-COV-2 RNA RESP QL NAA+PROBE: DETECTED
SODIUM SERPL-SCNC: 141 MMOL/L (ref 136–145)
TROPONIN I SERPL HS-MCNC: 15 NG/L
WBC # BLD AUTO: 6.8 X10(3) UL (ref 4–11)

## 2023-12-10 PROCEDURE — 99223 1ST HOSP IP/OBS HIGH 75: CPT | Performed by: HOSPITALIST

## 2023-12-10 PROCEDURE — 71275 CT ANGIOGRAPHY CHEST: CPT | Performed by: EMERGENCY MEDICINE

## 2023-12-10 RX ORDER — SODIUM CHLORIDE 9 MG/ML
INJECTION, SOLUTION INTRAVENOUS CONTINUOUS
Status: ACTIVE | OUTPATIENT
Start: 2023-12-10 | End: 2023-12-11

## 2023-12-10 RX ORDER — BUDESONIDE 0.5 MG/2ML
0.5 INHALANT ORAL 2 TIMES DAILY
Status: DISCONTINUED | OUTPATIENT
Start: 2023-12-10 | End: 2023-12-13

## 2023-12-10 RX ORDER — ONDANSETRON 2 MG/ML
4 INJECTION INTRAMUSCULAR; INTRAVENOUS EVERY 4 HOURS PRN
Status: ACTIVE | OUTPATIENT
Start: 2023-12-10 | End: 2023-12-11

## 2023-12-10 RX ORDER — POLYETHYLENE GLYCOL 3350 17 G/17G
17 POWDER, FOR SOLUTION ORAL DAILY PRN
Status: DISCONTINUED | OUTPATIENT
Start: 2023-12-10 | End: 2023-12-13

## 2023-12-10 RX ORDER — MORPHINE SULFATE 2 MG/ML
1 INJECTION, SOLUTION INTRAMUSCULAR; INTRAVENOUS EVERY 2 HOUR PRN
Status: CANCELLED | OUTPATIENT
Start: 2023-12-10

## 2023-12-10 RX ORDER — BISACODYL 10 MG
10 SUPPOSITORY, RECTAL RECTAL
Status: DISCONTINUED | OUTPATIENT
Start: 2023-12-10 | End: 2023-12-13

## 2023-12-10 RX ORDER — SENNOSIDES 8.6 MG
17.2 TABLET ORAL NIGHTLY PRN
Status: DISCONTINUED | OUTPATIENT
Start: 2023-12-10 | End: 2023-12-13

## 2023-12-10 RX ORDER — HYDROMORPHONE HYDROCHLORIDE 1 MG/ML
1 INJECTION, SOLUTION INTRAMUSCULAR; INTRAVENOUS; SUBCUTANEOUS EVERY 30 MIN PRN
Status: DISCONTINUED | OUTPATIENT
Start: 2023-12-10 | End: 2023-12-13

## 2023-12-10 RX ORDER — MORPHINE SULFATE 4 MG/ML
4 INJECTION, SOLUTION INTRAMUSCULAR; INTRAVENOUS EVERY 2 HOUR PRN
Status: CANCELLED | OUTPATIENT
Start: 2023-12-10

## 2023-12-10 RX ORDER — HYDROMORPHONE HYDROCHLORIDE 1 MG/ML
0.8 INJECTION, SOLUTION INTRAMUSCULAR; INTRAVENOUS; SUBCUTANEOUS EVERY 2 HOUR PRN
Status: DISCONTINUED | OUTPATIENT
Start: 2023-12-10 | End: 2023-12-13

## 2023-12-10 RX ORDER — ENOXAPARIN SODIUM 100 MG/ML
40 INJECTION SUBCUTANEOUS DAILY
Status: DISCONTINUED | OUTPATIENT
Start: 2023-12-11 | End: 2023-12-13

## 2023-12-10 RX ORDER — HYDROMORPHONE HYDROCHLORIDE 1 MG/ML
1 INJECTION, SOLUTION INTRAMUSCULAR; INTRAVENOUS; SUBCUTANEOUS EVERY 30 MIN PRN
Status: ACTIVE | OUTPATIENT
Start: 2023-12-10 | End: 2023-12-11

## 2023-12-10 RX ORDER — ONDANSETRON 2 MG/ML
4 INJECTION INTRAMUSCULAR; INTRAVENOUS EVERY 6 HOURS PRN
Status: DISCONTINUED | OUTPATIENT
Start: 2023-12-10 | End: 2023-12-13

## 2023-12-10 RX ORDER — HYDROMORPHONE HYDROCHLORIDE 1 MG/ML
0.4 INJECTION, SOLUTION INTRAMUSCULAR; INTRAVENOUS; SUBCUTANEOUS EVERY 2 HOUR PRN
Status: DISCONTINUED | OUTPATIENT
Start: 2023-12-10 | End: 2023-12-13

## 2023-12-10 RX ORDER — ACETAMINOPHEN 500 MG
500 TABLET ORAL EVERY 4 HOURS PRN
Status: DISCONTINUED | OUTPATIENT
Start: 2023-12-10 | End: 2023-12-13

## 2023-12-10 RX ORDER — SODIUM CHLORIDE 9 MG/ML
INJECTION, SOLUTION INTRAVENOUS CONTINUOUS
Status: DISCONTINUED | OUTPATIENT
Start: 2023-12-10 | End: 2023-12-13

## 2023-12-10 RX ORDER — HYDROMORPHONE HYDROCHLORIDE 1 MG/ML
0.2 INJECTION, SOLUTION INTRAMUSCULAR; INTRAVENOUS; SUBCUTANEOUS EVERY 2 HOUR PRN
Status: DISCONTINUED | OUTPATIENT
Start: 2023-12-10 | End: 2023-12-13

## 2023-12-10 RX ORDER — PROCHLORPERAZINE EDISYLATE 5 MG/ML
5 INJECTION INTRAMUSCULAR; INTRAVENOUS EVERY 8 HOURS PRN
Status: DISCONTINUED | OUTPATIENT
Start: 2023-12-10 | End: 2023-12-13

## 2023-12-10 RX ORDER — MORPHINE SULFATE 2 MG/ML
2 INJECTION, SOLUTION INTRAMUSCULAR; INTRAVENOUS EVERY 2 HOUR PRN
Status: CANCELLED | OUTPATIENT
Start: 2023-12-10

## 2023-12-10 RX ORDER — HYDRALAZINE HYDROCHLORIDE 20 MG/ML
10 INJECTION INTRAMUSCULAR; INTRAVENOUS ONCE
Status: COMPLETED | OUTPATIENT
Start: 2023-12-10 | End: 2023-12-10

## 2023-12-10 RX ORDER — ENEMA 19; 7 G/133ML; G/133ML
1 ENEMA RECTAL ONCE AS NEEDED
Status: DISCONTINUED | OUTPATIENT
Start: 2023-12-10 | End: 2023-12-13

## 2023-12-11 PROCEDURE — 99233 SBSQ HOSP IP/OBS HIGH 50: CPT | Performed by: HOSPITALIST

## 2023-12-11 RX ORDER — HYDRALAZINE HYDROCHLORIDE 20 MG/ML
10 INJECTION INTRAMUSCULAR; INTRAVENOUS EVERY 6 HOURS PRN
Status: DISCONTINUED | OUTPATIENT
Start: 2023-12-11 | End: 2023-12-13

## 2023-12-11 RX ORDER — KETOROLAC TROMETHAMINE 30 MG/ML
30 INJECTION, SOLUTION INTRAMUSCULAR; INTRAVENOUS EVERY 6 HOURS PRN
Status: DISCONTINUED | OUTPATIENT
Start: 2023-12-11 | End: 2023-12-13

## 2023-12-11 RX ORDER — KETOROLAC TROMETHAMINE 15 MG/ML
15 INJECTION, SOLUTION INTRAMUSCULAR; INTRAVENOUS EVERY 6 HOURS PRN
Status: DISCONTINUED | OUTPATIENT
Start: 2023-12-11 | End: 2023-12-13

## 2023-12-11 NOTE — PLAN OF CARE
Pt Aox4. Hypertensive. Hydralazine 10 mg administered PRN with good result. Reported severe headache. MD notified. Toradol 30 mg q6hrs PRN and Dilauid 0.8 mg Q2hrs PRN administered PRN.  NPO. EGD planned on 12/12. Consent obtained. I  Mucous emesis/ retching. Up independent. Will continue plan of care.        Problem: RESPIRATORY - ADULT  Goal: Achieves optimal ventilation and oxygenation  Description: INTERVENTIONS:  - Assess for changes in respiratory status  - Assess for changes in mentation and behavior  - Position to facilitate oxygenation and minimize respiratory effort  - Oxygen supplementation based on oxygen saturation or ABGs  - Provide Smoking Cessation handout, if applicable  - Encourage broncho-pulmonary hygiene including cough, deep breathe, Incentive Spirometry  - Assess the need for suctioning and perform as needed  - Assess and instruct to report SOB or any respiratory difficulty  - Respiratory Therapy support as indicated  - Manage/alleviate anxiety  - Monitor for signs/symptoms of CO2 retention  Outcome: Progressing     Problem: Impaired Swallowing  Goal: Minimize aspiration risk  Description: Interventions:  - Patient should be alert and upright for all feedings (90 degrees preferred)  - Offer food and liquids at a slow rate  - No straws  - Encourage small bites of food and small sips of liquid  - Offer pills one at a time, crush or deliver with applesauce as needed  - Discontinue feeding and notify MD (or speech pathologist) if coughing or persistent throat clearing or wet/gurgly vocal quality is noted  Outcome: Progressing

## 2023-12-11 NOTE — PLAN OF CARE
NURSING ADMISSION NOTE    Patient admitted via Cart  Oriented to room. Safety precautions initiated. Bed in low position. Call light in reach. Assumed care of pt at midnight. Pt a/o x4. VSS. Afebrile. Pt c/o rt & mid chest pain, PRN dilaudid given w/ improvement. IVF infusing per MAR. Strict NPO- pt reports recent difficulty swallowing & coughing w/ po intake. COVID + isolation in place. Denies difficulty breathing. GI to see due to stent migration. Admission navigator completed. Updated on POC, verbalized understanding. Call light within reach. Safety precautions in place. 0500: Pt reporting more persistent pain in Rt chest & pressure/ headache. Dr. Cortez wren notified. 5799: Hypertensive 175/88- MD notified. PRN hydralazine given.      Problem: RESPIRATORY - ADULT  Goal: Achieves optimal ventilation and oxygenation  Description: INTERVENTIONS:  - Assess for changes in respiratory status  - Assess for changes in mentation and behavior  - Position to facilitate oxygenation and minimize respiratory effort  - Oxygen supplementation based on oxygen saturation or ABGs  - Provide Smoking Cessation handout, if applicable  - Encourage broncho-pulmonary hygiene including cough, deep breathe, Incentive Spirometry  - Assess the need for suctioning and perform as needed  - Assess and instruct to report SOB or any respiratory difficulty  - Respiratory Therapy support as indicated  - Manage/alleviate anxiety  - Monitor for signs/symptoms of CO2 retention  Outcome: Progressing     Problem: Impaired Swallowing  Goal: Minimize aspiration risk  Description: Interventions:  - Patient should be alert and upright for all feedings (90 degrees preferred)  - Offer food and liquids at a slow rate  - No straws  - Encourage small bites of food and small sips of liquid  - Offer pills one at a time, crush or deliver with applesauce as needed  - Discontinue feeding and notify MD (or speech pathologist) if coughing or persistent throat clearing or wet/gurgly vocal quality is noted  Outcome: Progressing

## 2023-12-11 NOTE — ED QUICK NOTES
Orders for admission, patient is aware of plan and ready to go upstairs. Any questions, please call ED RN darryl at extension apod.      Patient Covid vaccination status: Fully vaccinated     COVID Test Ordered in ED: SARS-CoV-2 by PCR    COVID Suspicion at Admission: sent    Running Infusions:  None    Mental Status/LOC at time of transport: a/ox3    Other pertinent information:   CIWA score: N/A   NIH score:  N/A

## 2023-12-11 NOTE — ED INITIAL ASSESSMENT (HPI)
To the ED via walk-in A&Ox4/4 and in wheelchair with c/o right side of chest pain, cough, trouble breathing, hx of esophageal and pancreatic cancer

## 2023-12-12 ENCOUNTER — ANESTHESIA EVENT (OUTPATIENT)
Dept: ENDOSCOPY | Facility: HOSPITAL | Age: 53
End: 2023-12-12
Payer: COMMERCIAL

## 2023-12-12 ENCOUNTER — ANESTHESIA (OUTPATIENT)
Dept: ENDOSCOPY | Facility: HOSPITAL | Age: 53
End: 2023-12-12
Payer: COMMERCIAL

## 2023-12-12 ENCOUNTER — APPOINTMENT (OUTPATIENT)
Dept: GENERAL RADIOLOGY | Facility: HOSPITAL | Age: 53
End: 2023-12-12
Attending: INTERNAL MEDICINE
Payer: COMMERCIAL

## 2023-12-12 VITALS
TEMPERATURE: 98 F | HEART RATE: 68 BPM | DIASTOLIC BLOOD PRESSURE: 78 MMHG | HEIGHT: 74 IN | SYSTOLIC BLOOD PRESSURE: 160 MMHG | BODY MASS INDEX: 23.82 KG/M2 | WEIGHT: 185.63 LBS | OXYGEN SATURATION: 98 % | RESPIRATION RATE: 18 BRPM

## 2023-12-12 LAB
ANION GAP SERPL CALC-SCNC: 4 MMOL/L (ref 0–18)
ATRIAL RATE: 58 BPM
BASOPHILS # BLD AUTO: 0.03 X10(3) UL (ref 0–0.2)
BASOPHILS NFR BLD AUTO: 0.6 %
BUN BLD-MCNC: 7 MG/DL (ref 9–23)
CALCIUM BLD-MCNC: 8.8 MG/DL (ref 8.5–10.1)
CHLORIDE SERPL-SCNC: 105 MMOL/L (ref 98–112)
CO2 SERPL-SCNC: 30 MMOL/L (ref 21–32)
CREAT BLD-MCNC: 0.52 MG/DL
EGFRCR SERPLBLD CKD-EPI 2021: 120 ML/MIN/1.73M2 (ref 60–?)
EOSINOPHIL # BLD AUTO: 0.21 X10(3) UL (ref 0–0.7)
EOSINOPHIL NFR BLD AUTO: 4.2 %
ERYTHROCYTE [DISTWIDTH] IN BLOOD BY AUTOMATED COUNT: 13.3 %
GLUCOSE BLD-MCNC: 89 MG/DL (ref 70–99)
HCT VFR BLD AUTO: 35.4 %
HGB BLD-MCNC: 11.5 G/DL
IMM GRANULOCYTES # BLD AUTO: 0.08 X10(3) UL (ref 0–1)
IMM GRANULOCYTES NFR BLD: 1.6 %
LYMPHOCYTES # BLD AUTO: 0.52 X10(3) UL (ref 1–4)
LYMPHOCYTES NFR BLD AUTO: 10.3 %
MAGNESIUM SERPL-MCNC: 1.8 MG/DL (ref 1.6–2.6)
MCH RBC QN AUTO: 31.9 PG (ref 26–34)
MCHC RBC AUTO-ENTMCNC: 32.5 G/DL (ref 31–37)
MCV RBC AUTO: 98.1 FL
MONOCYTES # BLD AUTO: 0.46 X10(3) UL (ref 0.1–1)
MONOCYTES NFR BLD AUTO: 9.1 %
NEUTROPHILS # BLD AUTO: 3.76 X10 (3) UL (ref 1.5–7.7)
NEUTROPHILS # BLD AUTO: 3.76 X10(3) UL (ref 1.5–7.7)
NEUTROPHILS NFR BLD AUTO: 74.2 %
OSMOLALITY SERPL CALC.SUM OF ELEC: 285 MOSM/KG (ref 275–295)
P AXIS: 19 DEGREES
P-R INTERVAL: 140 MS
PLATELET # BLD AUTO: 161 10(3)UL (ref 150–450)
POTASSIUM SERPL-SCNC: 3.7 MMOL/L (ref 3.5–5.1)
Q-T INTERVAL: 404 MS
QRS DURATION: 94 MS
QTC CALCULATION (BEZET): 396 MS
R AXIS: 44 DEGREES
RBC # BLD AUTO: 3.61 X10(6)UL
SODIUM SERPL-SCNC: 139 MMOL/L (ref 136–145)
T AXIS: 61 DEGREES
VENTRICULAR RATE: 58 BPM
WBC # BLD AUTO: 5.1 X10(3) UL (ref 4–11)

## 2023-12-12 PROCEDURE — 0DP58DZ REMOVAL OF INTRALUMINAL DEVICE FROM ESOPHAGUS, VIA NATURAL OR ARTIFICIAL OPENING ENDOSCOPIC: ICD-10-PCS | Performed by: INTERNAL MEDICINE

## 2023-12-12 PROCEDURE — 99232 SBSQ HOSP IP/OBS MODERATE 35: CPT | Performed by: HOSPITALIST

## 2023-12-12 PROCEDURE — 74360 X-RAY GUIDE GI DILATION: CPT | Performed by: INTERNAL MEDICINE

## 2023-12-12 RX ORDER — LIDOCAINE HYDROCHLORIDE 10 MG/ML
INJECTION, SOLUTION EPIDURAL; INFILTRATION; INTRACAUDAL; PERINEURAL AS NEEDED
Status: DISCONTINUED | OUTPATIENT
Start: 2023-12-12 | End: 2023-12-12 | Stop reason: SURG

## 2023-12-12 RX ORDER — PANTOPRAZOLE SODIUM 40 MG/1
40 TABLET, DELAYED RELEASE ORAL EVERY 12 HOURS
Status: DISCONTINUED | OUTPATIENT
Start: 2023-12-12 | End: 2023-12-13

## 2023-12-12 RX ORDER — SODIUM CHLORIDE, SODIUM LACTATE, POTASSIUM CHLORIDE, CALCIUM CHLORIDE 600; 310; 30; 20 MG/100ML; MG/100ML; MG/100ML; MG/100ML
INJECTION, SOLUTION INTRAVENOUS CONTINUOUS
Status: DISCONTINUED | OUTPATIENT
Start: 2023-12-12 | End: 2023-12-12

## 2023-12-12 RX ORDER — SERTRALINE HYDROCHLORIDE 100 MG/1
100 TABLET, FILM COATED ORAL DAILY
Qty: 90 TABLET | Refills: 1 | Status: ON HOLD | OUTPATIENT
Start: 2023-12-12

## 2023-12-12 RX ORDER — MAGNESIUM SULFATE HEPTAHYDRATE 40 MG/ML
2 INJECTION, SOLUTION INTRAVENOUS ONCE
Status: COMPLETED | OUTPATIENT
Start: 2023-12-12 | End: 2023-12-12

## 2023-12-12 RX ORDER — ONDANSETRON 2 MG/ML
4 INJECTION INTRAMUSCULAR; INTRAVENOUS ONCE AS NEEDED
Status: DISCONTINUED | OUTPATIENT
Start: 2023-12-12 | End: 2023-12-12 | Stop reason: HOSPADM

## 2023-12-12 RX ORDER — NALOXONE HYDROCHLORIDE 0.4 MG/ML
0.08 INJECTION, SOLUTION INTRAMUSCULAR; INTRAVENOUS; SUBCUTANEOUS ONCE AS NEEDED
Status: DISCONTINUED | OUTPATIENT
Start: 2023-12-12 | End: 2023-12-12 | Stop reason: HOSPADM

## 2023-12-12 RX ADMIN — LIDOCAINE HYDROCHLORIDE 10 MG: 10 INJECTION, SOLUTION EPIDURAL; INFILTRATION; INTRACAUDAL; PERINEURAL at 17:08:00

## 2023-12-12 NOTE — ANESTHESIA POSTPROCEDURE EVALUATION
[address] Patient Status:  Inpatient   Age/Gender 46year old male MRN [id number]   Location [address] [address] Attending Natividad Shaw MD   [address] Day # 2 PCP Stevie Fox MD       Anesthesia Post-op Note    ESOPHAGOGASTRODUODENOSCOPY (EGD) with removal of stent    Procedure Summary       Date: 12/12/23 Room / Location: [facility] ENDOSCOPY 04 / [facility] ENDOSCOPY    Anesthesia Start: 4899 Anesthesia Stop:     Procedure: ESOPHAGOGASTRODUODENOSCOPY (EGD) with removal of stent Diagnosis: (stent removal)    Surgeons: Dariela Velázquez MD Anesthesiologist: Carmen Garcia MD    Anesthesia Type: MAC ASA Status: 2            Anesthesia Type: MAC    Vitals Value Taken Time   /72 12/12/23 1733   Temp 98 12/12/23 1733   Pulse 75 12/12/23 1733   Resp 16 12/12/23 1733   SpO2 96 12/12/23 1733       Patient Location: Endoscopy    Anesthesia Type: MAC    Airway Patency: patent    Postop Pain Control: adequate    Mental Status: mildly sedated but able to meaningfully participate in the post-anesthesia evaluation    Nausea/Vomiting: none    Cardiopulmonary/Hydration status: stable euvolemic    Complications: no apparent anesthesia related complications    Postop vital signs: stable    Dental Exam: Unchanged from Preop    Patient to be discharged from PACU when criteria met.

## 2023-12-12 NOTE — PLAN OF CARE
A&Ox4, RA, up ad tito  On tele (SB)  Headache managed w/prn Toradol and dilaudid   Strict npo     NPO. EGD planned on 12/12.      Problem: Impaired Swallowing  Goal: Minimize aspiration risk  Description: Interventions:  - Patient should be alert and upright for all feedings (90 degrees preferred)  - Offer food and liquids at a slow rate  - No straws  - Encourage small bites of food and small sips of liquid  - Offer pills one at a time, crush or deliver with applesauce as needed  - Discontinue feeding and notify MD (or speech pathologist) if coughing or persistent throat clearing or wet/gurgly vocal quality is noted  Outcome: Progressing     Problem: PAIN - ADULT  Goal: Verbalizes/displays adequate comfort level or patient's stated pain goal  Description: INTERVENTIONS:  - Encourage pt to monitor pain and request assistance  - Assess pain using appropriate pain scale  - Administer analgesics based on type and severity of pain and evaluate response  - Implement non-pharmacological measures as appropriate and evaluate response  - Consider cultural and social influences on pain and pain management  - Manage/alleviate anxiety  - Utilize distraction and/or relaxation techniques  - Monitor for opioid side effects  - Notify MD/LIP if interventions unsuccessful or patient reports new pain  - Anticipate increased pain with activity and pre-medicate as appropriate  Outcome: Progressing

## 2023-12-12 NOTE — PLAN OF CARE
Pt Aox4. VSS. Reported severe headache. MD notified. Toradol 30 mg q6hrs PRN and Dilauid 0.8 mg Q2hrs PRN administered PRN. EGD done today. Clear liquid diet initiated. Will advance as tolerated. Up independent. Will continue plan of care.        Problem: RESPIRATORY - ADULT  Goal: Achieves optimal ventilation and oxygenation  Description: INTERVENTIONS:  - Assess for changes in respiratory status  - Assess for changes in mentation and behavior  - Position to facilitate oxygenation and minimize respiratory effort  - Oxygen supplementation based on oxygen saturation or ABGs  - Provide Smoking Cessation handout, if applicable  - Encourage broncho-pulmonary hygiene including cough, deep breathe, Incentive Spirometry  - Assess the need for suctioning and perform as needed  - Assess and instruct to report SOB or any respiratory difficulty  - Respiratory Therapy support as indicated  - Manage/alleviate anxiety  - Monitor for signs/symptoms of CO2 retention  Outcome: Progressing     Problem: Impaired Swallowing  Goal: Minimize aspiration risk  Description: Interventions:  - Patient should be alert and upright for all feedings (90 degrees preferred)  - Offer food and liquids at a slow rate  - No straws  - Encourage small bites of food and small sips of liquid  - Offer pills one at a time, crush or deliver with applesauce as needed  - Discontinue feeding and notify MD (or speech pathologist) if coughing or persistent throat clearing or wet/gurgly vocal quality is noted  Outcome: Progressing     Problem: PAIN - ADULT  Goal: Verbalizes/displays adequate comfort level or patient's stated pain goal  Description: INTERVENTIONS:  - Encourage pt to monitor pain and request assistance  - Assess pain using appropriate pain scale  - Administer analgesics based on type and severity of pain and evaluate response  - Implement non-pharmacological measures as appropriate and evaluate response  - Consider cultural and social influences on pain and pain management  - Manage/alleviate anxiety  - Utilize distraction and/or relaxation techniques  - Monitor for opioid side effects  - Notify MD/LIP if interventions unsuccessful or patient reports new pain  - Anticipate increased pain with activity and pre-medicate as appropriate  Outcome: Progressing

## 2023-12-13 LAB
ALBUMIN SERPL-MCNC: 2.9 G/DL (ref 3.4–5)
ALBUMIN/GLOB SERPL: 0.8 {RATIO} (ref 1–2)
ALP LIVER SERPL-CCNC: 101 U/L
ALT SERPL-CCNC: 24 U/L
ANION GAP SERPL CALC-SCNC: 5 MMOL/L (ref 0–18)
AST SERPL-CCNC: 24 U/L (ref 15–37)
BASOPHILS # BLD AUTO: 0.03 X10(3) UL (ref 0–0.2)
BASOPHILS NFR BLD AUTO: 0.7 %
BILIRUB SERPL-MCNC: 0.3 MG/DL (ref 0.1–2)
BUN BLD-MCNC: 8 MG/DL (ref 9–23)
CALCIUM BLD-MCNC: 8.8 MG/DL (ref 8.5–10.1)
CHLORIDE SERPL-SCNC: 107 MMOL/L (ref 98–112)
CO2 SERPL-SCNC: 27 MMOL/L (ref 21–32)
CREAT BLD-MCNC: 0.45 MG/DL
EGFRCR SERPLBLD CKD-EPI 2021: 125 ML/MIN/1.73M2 (ref 60–?)
EOSINOPHIL # BLD AUTO: 0.12 X10(3) UL (ref 0–0.7)
EOSINOPHIL NFR BLD AUTO: 2.7 %
ERYTHROCYTE [DISTWIDTH] IN BLOOD BY AUTOMATED COUNT: 13.4 %
GLOBULIN PLAS-MCNC: 3.5 G/DL (ref 2.8–4.4)
GLUCOSE BLD-MCNC: 90 MG/DL (ref 70–99)
HCT VFR BLD AUTO: 36.1 %
HGB BLD-MCNC: 11.8 G/DL
IMM GRANULOCYTES # BLD AUTO: 0.06 X10(3) UL (ref 0–1)
IMM GRANULOCYTES NFR BLD: 1.4 %
LYMPHOCYTES # BLD AUTO: 0.57 X10(3) UL (ref 1–4)
LYMPHOCYTES NFR BLD AUTO: 13 %
MAGNESIUM SERPL-MCNC: 2 MG/DL (ref 1.6–2.6)
MCH RBC QN AUTO: 31.8 PG (ref 26–34)
MCHC RBC AUTO-ENTMCNC: 32.7 G/DL (ref 31–37)
MCV RBC AUTO: 97.3 FL
MONOCYTES # BLD AUTO: 0.37 X10(3) UL (ref 0.1–1)
MONOCYTES NFR BLD AUTO: 8.4 %
NEUTROPHILS # BLD AUTO: 3.23 X10 (3) UL (ref 1.5–7.7)
NEUTROPHILS # BLD AUTO: 3.23 X10(3) UL (ref 1.5–7.7)
NEUTROPHILS NFR BLD AUTO: 73.8 %
OSMOLALITY SERPL CALC.SUM OF ELEC: 286 MOSM/KG (ref 275–295)
PLATELET # BLD AUTO: 186 10(3)UL (ref 150–450)
POTASSIUM SERPL-SCNC: 4 MMOL/L (ref 3.5–5.1)
PROT SERPL-MCNC: 6.4 G/DL (ref 6.4–8.2)
RBC # BLD AUTO: 3.71 X10(6)UL
SODIUM SERPL-SCNC: 139 MMOL/L (ref 136–145)
WBC # BLD AUTO: 4.4 X10(3) UL (ref 4–11)

## 2023-12-13 PROCEDURE — 99239 HOSP IP/OBS DSCHRG MGMT >30: CPT | Performed by: HOSPITALIST

## 2023-12-13 RX ORDER — OMEPRAZOLE 40 MG/1
40 CAPSULE, DELAYED RELEASE ORAL
Qty: 90 CAPSULE | Refills: 3 | Status: ON HOLD | OUTPATIENT
Start: 2023-12-13 | End: 2024-06-10

## 2023-12-13 NOTE — DISCHARGE INSTRUCTIONS
You tested positive for Covid, per CDC guidelines please continue to isolate for 2 more days   then after isolation,please continue to wear tight fitting facial mask for another 5 days

## 2023-12-13 NOTE — RESPIRATORY THERAPY NOTE
Received patient on room air. Patient given Pulmicort nebulizer as ordered. Patient breath sounds remain diminished before and after treatments.  Patient tolerated treatments well     Sujatha Begin  RT

## 2023-12-13 NOTE — CDS QUERY
Jeanna Toussaint  Dear Doctor: Jason Aguirre information (provided below) includes documentation of a Covid-19 Infection. BASED ON YOUR CLINICAL JUDGEMENT, PLEASE SELECT   THE MOST APPROPRIATE DIAGNOSIS:     (    ) Covid-19 is a current & active infection   (  x  ) Covid-19 is not a current infection, but may be a residual RNA from past infection or could also be early pre symptomatic or asymptomatic (documented by ID)  (    ) Other (please comment) ____________________________________  (    ) Clinically unable to determine  ____________________________________________________________________________                                                    Documentation from the Medical Record:  Risk Factors Immunocompromised -history of metastatic pancreatic and esophageal cancer   Clinical Indicators   >12/11/23 ID consult - 1. COVID-19  Not vaccinated last 2 years, last vaccine 20 months ago in 4/2022    -immunocompromised on chemo for esophageal cancer  PCR positive on 12/10  Denies symptoms no fever, URI symptoms Suspect testing may reflect residual RNA from past infection or could also be early pre symptomatic or asymptomatic  At this time given lack of symptoms can hold off on antivirals, however would have a low threshold to treat if symptomatic.  >12/11/23 Hospitalist- COVID, pt w/o hypoxia but with multiple co-morbidities  ID eval for ELIF  >12/12/23 ID COVID-19  PCR positive on 12/10  Denies symptoms no fever, URI symptoms Suspect testing may reflect residual RNA from past infection or could also be early pre symptomatic or asymptomatic  >12/12/23 Hospitalist -COVID, pt w/o hypoxia but with multiple co-morbidities  Hold on ELIF unless pt symptomatic per ID  >12/12/23 RN note Maintained on Droplet contact Isolation due to Covid positive test. Patient  remains asymptomatic.  -Rapid SARS-CoV-2 by PCR - Detected     Treatments ID consult, Droplet contact isolation,       For questions regarding this query, please contact Clinical Documentation :  Dandy Stratton RN/BSN  [phone] cell                      Thank you!                                                            THIS FORM IS A PERMANENT PART OF THE MEDICAL RECORD

## 2023-12-13 NOTE — PLAN OF CARE
Patient s/p EGD with Stent retrieval. Currently on Clear Liquid and may advance diet as tolerated. Target: Soft Diet per GI. Still complaining of Throat pain, managed by IV pain medications with moderate relief. Denies nausea and vomiting. Maintained on Droplet contact Isolation due to Covid positive test. Patient remains asymptomatic. Alert and oriented x4. Afebrile. Room air. No SOB. Up to the bathroom. Call light in reach. Needs attended.      Problem: RESPIRATORY - ADULT  Goal: Achieves optimal ventilation and oxygenation  Description: INTERVENTIONS:  - Assess for changes in respiratory status  - Assess for changes in mentation and behavior  - Position to facilitate oxygenation and minimize respiratory effort  - Oxygen supplementation based on oxygen saturation or ABGs  - Provide Smoking Cessation handout, if applicable  - Encourage broncho-pulmonary hygiene including cough, deep breathe, Incentive Spirometry  - Assess the need for suctioning and perform as needed  - Assess and instruct to report SOB or any respiratory difficulty  - Respiratory Therapy support as indicated  - Manage/alleviate anxiety  - Monitor for signs/symptoms of CO2 retention  Outcome: Progressing     Problem: Impaired Swallowing  Goal: Minimize aspiration risk  Description: Interventions:  - Patient should be alert and upright for all feedings (90 degrees preferred)  - Offer food and liquids at a slow rate  - No straws  - Encourage small bites of food and small sips of liquid  - Offer pills one at a time, crush or deliver with applesauce as needed  - Discontinue feeding and notify MD (or speech pathologist) if coughing or persistent throat clearing or wet/gurgly vocal quality is noted  Outcome: Progressing     Problem: PAIN - ADULT  Goal: Verbalizes/displays adequate comfort level or patient's stated pain goal  Description: INTERVENTIONS:  - Encourage pt to monitor pain and request assistance  - Assess pain using appropriate pain scale  - Administer analgesics based on type and severity of pain and evaluate response  - Implement non-pharmacological measures as appropriate and evaluate response  - Consider cultural and social influences on pain and pain management  - Manage/alleviate anxiety  - Utilize distraction and/or relaxation techniques  - Monitor for opioid side effects  - Notify MD/LIP if interventions unsuccessful or patient reports new pain  - Anticipate increased pain with activity and pre-medicate as appropriate  Outcome: Progressing

## 2023-12-14 ENCOUNTER — TELEPHONE (OUTPATIENT)
Dept: FAMILY MEDICINE CLINIC | Facility: CLINIC | Age: 53
End: 2023-12-14

## 2023-12-14 ENCOUNTER — PATIENT OUTREACH (OUTPATIENT)
Dept: CASE MANAGEMENT | Age: 53
End: 2023-12-14

## 2023-12-14 ENCOUNTER — PATIENT MESSAGE (OUTPATIENT)
Dept: FAMILY MEDICINE CLINIC | Facility: CLINIC | Age: 53
End: 2023-12-14

## 2023-12-14 DIAGNOSIS — Z02.9 ENCOUNTERS FOR UNSPECIFIED ADMINISTRATIVE PURPOSE: Primary | ICD-10-CM

## 2023-12-14 DIAGNOSIS — T85.528A MIGRATION OF ESOPHAGEAL STENT, INITIAL ENCOUNTER: ICD-10-CM

## 2023-12-14 NOTE — TELEPHONE ENCOUNTER
Spoke to patient for TCM today.  Patient does not have an appointment scheduled at this time. Pt declined an appt for now with Dr. Horowitz as pt is following up with Hem/Onc. Pt would like to see if Dr. Horowitz would like to see him? Please advise. Pt is willing to come in if Dr. Horowitz would like to see him. Pt did test positive for COVID on 12/10/23 and is asymptomatic.      TCM appointment recommended by 12/20/23 as patient is a High risk for readmission.     BOOK BY DATE (last date for TCM): 12/27/23    Clinical staff:  Please follow-up with patient and try to get them to schedule as patient would greatly benefit from TCM appointment.  Thank you!

## 2023-12-14 NOTE — PROGRESS NOTES
Initial Post Discharge Follow Up   Discharge Date: 12/13/23  Contact Date: 12/14/2023    Consent Verification:  Assessment Completed With: Patient  HIPAA Verified? Yes    Discharge Dx:   Migrated esophageal stent  Metastatic esophageal cancer s/p gastroesophagectomy 9/22  Metastatic pancreatic cancer  COVID19 infection    General:   How have you been since your discharge from the hospital? Pt reports he is doing okay, better. Pt denies pain and reports the HA have resolved. Pt is eating well, denies n/v/d. Pt feels swallowing is good, denies issues at this time. Pt is making sure to take his time and eating small amounts. Pt denies cough, congestion, CP, SOB, dizziness, weakness, confusion, fevers and vision changes. Pt feels his energy level has improved as he is eating more. Pt is not checking B/P at home but was encouraged to check it daily and to keep a log as his readings were elevated during his stay. Do you have any pain since discharge? No    How well was your pain managed while in the hospital?   On a scale of 1-5   1- Very Poor and 5- Very well   Very Well  When you were leaving the hospital were your discharge instructions reviewed with you? Yes  How well were your discharge instructions explained to you? On a scale of 1-5   1- Very Poor and 5- Very well   Very Well  Do you have any questions about your discharge instructions? No  Before leaving the hospital was your diagnoses explained to you? Yes  Do you have any questions about your diagnoses? No  Are you able to perform normal daily activities of living as you have prior to your hospital stay (dressing, bathing, ambulating to the bathroom, etc)? yes  (NCM) Was patient given a different diet per AVS? no    Medications:   Current Outpatient Medications   Medication Sig Dispense Refill    Omeprazole 40 MG Oral Capsule Delayed Release Take 1 capsule (40 mg total) by mouth 2 (two) times daily before meals.  90 capsule 3    sertraline 100 MG Oral Tab Take 1 tablet (100 mg total) by mouth daily. 90 tablet 1    budesonide 0.5 MG/2ML Inhalation Suspension Take 2 mL (0.5 mg total) by nebulization 2 (two) times daily. 56 mL 0    ipratropium-albuterol 0.5-2.5 (3) MG/3ML Inhalation Solution Take 3 mL by nebulization every 6 (six) hours as needed (wheezing). 168 mL 1    albuterol (2.5 MG/3ML) 0.083% Inhalation Nebu Soln Inhale 3 mL (2.5 mg total) into the lungs. baclofen 10 MG Oral Tab Take 1 tablet (10 mg total) by mouth 3 (three) times daily as needed. 180 tablet 0    Pancrelipase, Lip-Prot-Amyl, (CREON) 93252-88774 units Oral Cap DR Particles Take 2 capsules with meals and 1 capsule with snacks 240 capsule 0    metoprolol succinate ER 50 MG Oral Tablet 24 Hr TAKE 1 TABLET BY MOUTH EVERY DAY 90 tablet 1     Were there any changes to your current medication(s) noted on the AVS? Yes  If so, were these medication changes discussed with you prior to leaving the hospital? Yes  If a new medication was prescribed:  no new medications were started  Let's go over your medications together to make sure we are not missing anything. Medications Reviewed  Are there any reasons that keep you from taking your medication as prescribed? No  Are you having any concerns with constipation? No  Did patient receive their flu shot (Sept-March)? Yes    Discharge medications reviewed/discussed/and reconciled against outpatient medications with patient. Any changes or updates to medications sent to PCP. Patient Acknowledged     Referrals/orders at D/C:  Referrals/orders placed at D/C? no  DME ordered at D/C? No    Discharge orders, AVS reviewed and discussed with patient. Any changes or updates to orders sent to PCP.   Patient Acknowledged      SDOH:   Transportation Needs: No Transportation Needs (12/14/2023)    Transportation Needs     Lack of Transportation: No     Financial Resource Strain: Low Risk  (12/14/2023)    Financial Resource Strain     Difficulty of Paying Living Expenses: Not very hard     Med Affordability: No       Diagnosis specifics: Follow up appointments:  Pt denies any barriers to keeping/getting to HFU appts. Your appointments       Date & Time Appointment Department [facility])    Dec 18, 2023  9:00 AM CST CHEMO INFUSION CENTRAL LINE LAB with SERA Cuenca in [place] ([address] [place] [address])        Dec 26, 2023  8:00 AM CST CHEMO INFUSION CHEMOTHERAPY with SERA Cuenca in [address])        Dec 26, 2023  8:30 AM CST Follow-Up Visit with ARTEMIO Almaraz [facility] in [place] ([address])              [facility] in [address]  [phone] [facility] in [address]  [phone]            TCC  Was TCC ordered: No    PCP (If no TCC appointment)  Does patient already have a PCP appointment scheduled? No  NCM Attempted to schedule PCP office TCM appointment with patient   If no appointment scheduled: Explain: pt declined HFU with PCP and requested to send a message to PCP office to see if Appt is needed. TE will be sent as requested. Specialist    Does the patient have any other follow up appointment(s) needing to be scheduled? Yes  If yes: NCM reviewed upcoming specialist appointment with patient: Yes  Does the patient need assistance scheduling appointment(s): No- Pt is aware he is to FU with GI in one month. Pt plans to call for an appt. Is there any reason as to why you cannot make your appointment(s)? No     Needs post D/C:   Now that you are home, are there any needs or concerns you need addressed before your next visit with your PCP?  (DME, meds, questions, etc.): No    Interventions by NCM:    All d/c instructions reviewed with the pt.  Reviewed when to call MD vs when to call 911 or go the ED. Discussed s/s of COVID. Educated pt on the importance of taking all meds as prescribed as well as close f/u with PCP/specialists. Pt verbalized understanding and will contact the office with any further questions or concerns. Pt is aware to isolate for 5 days total and then wear a mask for 5 additional days when around others. Overall Rating:    How would you rate the care you received while in the hospital? good    CCM referral placed:    Not Applicable

## 2023-12-14 NOTE — TELEPHONE ENCOUNTER
Pt. Asking PCP review hospitalization vitals (12/10 - 12/13). BP in 200's. States was not given Rx upon discharge and wants to know if you think he needs metoprolol. Covid+ 12/10 at hospital, asymptomatic tested in hosp twice more but missing results. [facility] test done 12/13 Negative. Has HFU 12/22 asks if you want/need to see him sooner. Please advise.

## 2023-12-15 NOTE — TELEPHONE ENCOUNTER
Patient called saying you wanted to  have a VV with patient Mon or Tues but there's nothing open until the 28th. Please advise if I can dbl bk and if so, where?

## 2023-12-15 NOTE — TELEPHONE ENCOUNTER
VM left requesting pt call to schedule OV or VV for 12/18 or 12/19 per PCP request. ER precautions provided.

## 2023-12-15 NOTE — TELEPHONE ENCOUNTER
Needs OV or VV(with audio and visual) on Monday, December 18 or 23 th.  If BP is remaining in 200's consistently, should go to ER

## 2023-12-16 ENCOUNTER — HOSPITAL ENCOUNTER (INPATIENT)
Facility: HOSPITAL | Age: 53
LOS: 3 days | Discharge: HOME | End: 2023-12-20
Attending: EMERGENCY MEDICINE | Admitting: HOSPITALIST
Payer: COMMERCIAL

## 2023-12-16 ENCOUNTER — APPOINTMENT (OUTPATIENT)
Dept: CT IMAGING | Facility: HOSPITAL | Age: 53
End: 2023-12-16
Attending: EMERGENCY MEDICINE
Payer: COMMERCIAL

## 2023-12-16 ENCOUNTER — APPOINTMENT (OUTPATIENT)
Dept: GENERAL RADIOLOGY | Facility: HOSPITAL | Age: 53
End: 2023-12-16
Attending: EMERGENCY MEDICINE
Payer: COMMERCIAL

## 2023-12-16 DIAGNOSIS — C15.9 MALIGNANT NEOPLASM OF ESOPHAGUS, UNSPECIFIED LOCATION (HCC): ICD-10-CM

## 2023-12-16 DIAGNOSIS — R11.10 INTRACTABLE VOMITING: Primary | ICD-10-CM

## 2023-12-16 DIAGNOSIS — C25.9 MALIGNANT NEOPLASM OF PANCREAS, UNSPECIFIED LOCATION OF MALIGNANCY (HCC): ICD-10-CM

## 2023-12-16 DIAGNOSIS — R91.8 PULMONARY INFILTRATES: ICD-10-CM

## 2023-12-16 LAB
ALBUMIN SERPL-MCNC: 3 G/DL (ref 3.4–5)
ALBUMIN/GLOB SERPL: 0.8 {RATIO} (ref 1–2)
ALP LIVER SERPL-CCNC: 112 U/L
ALT SERPL-CCNC: 31 U/L
ANION GAP SERPL CALC-SCNC: 6 MMOL/L (ref 0–18)
AST SERPL-CCNC: 33 U/L (ref 15–37)
BASOPHILS # BLD AUTO: 0.03 X10(3) UL (ref 0–0.2)
BASOPHILS NFR BLD AUTO: 0.3 %
BILIRUB SERPL-MCNC: 0.4 MG/DL (ref 0.1–2)
BUN BLD-MCNC: 14 MG/DL (ref 9–23)
CALCIUM BLD-MCNC: 8.7 MG/DL (ref 8.5–10.1)
CHLORIDE SERPL-SCNC: 108 MMOL/L (ref 98–112)
CO2 SERPL-SCNC: 26 MMOL/L (ref 21–32)
CREAT BLD-MCNC: 0.66 MG/DL
EGFRCR SERPLBLD CKD-EPI 2021: 111 ML/MIN/1.73M2 (ref 60–?)
EOSINOPHIL # BLD AUTO: 0.2 X10(3) UL (ref 0–0.7)
EOSINOPHIL NFR BLD AUTO: 1.7 %
ERYTHROCYTE [DISTWIDTH] IN BLOOD BY AUTOMATED COUNT: 13.9 %
FLUAV + FLUBV RNA SPEC NAA+PROBE: NEGATIVE
FLUAV + FLUBV RNA SPEC NAA+PROBE: NEGATIVE
GLOBULIN PLAS-MCNC: 3.8 G/DL (ref 2.8–4.4)
GLUCOSE BLD-MCNC: 113 MG/DL (ref 70–99)
HCT VFR BLD AUTO: 34.2 %
HGB BLD-MCNC: 11 G/DL
IMM GRANULOCYTES # BLD AUTO: 0.09 X10(3) UL (ref 0–1)
IMM GRANULOCYTES NFR BLD: 0.8 %
LACTATE SERPL-SCNC: 0.6 MMOL/L (ref 0.4–2)
LYMPHOCYTES # BLD AUTO: 0.64 X10(3) UL (ref 1–4)
LYMPHOCYTES NFR BLD AUTO: 5.6 %
MCH RBC QN AUTO: 31.3 PG (ref 26–34)
MCHC RBC AUTO-ENTMCNC: 32.2 G/DL (ref 31–37)
MCV RBC AUTO: 97.4 FL
MONOCYTES # BLD AUTO: 0.82 X10(3) UL (ref 0.1–1)
MONOCYTES NFR BLD AUTO: 7.2 %
NEUTROPHILS # BLD AUTO: 9.67 X10 (3) UL (ref 1.5–7.7)
NEUTROPHILS # BLD AUTO: 9.67 X10(3) UL (ref 1.5–7.7)
NEUTROPHILS NFR BLD AUTO: 84.4 %
OSMOLALITY SERPL CALC.SUM OF ELEC: 291 MOSM/KG (ref 275–295)
PLATELET # BLD AUTO: 172 10(3)UL (ref 150–450)
POTASSIUM SERPL-SCNC: 3.9 MMOL/L (ref 3.5–5.1)
PROCALCITONIN SERPL-MCNC: 0.14 NG/ML (ref ?–0.16)
PROT SERPL-MCNC: 6.8 G/DL (ref 6.4–8.2)
RBC # BLD AUTO: 3.51 X10(6)UL
RSV RNA SPEC NAA+PROBE: NEGATIVE
SARS-COV-2 RNA RESP QL NAA+PROBE: DETECTED
SODIUM SERPL-SCNC: 140 MMOL/L (ref 136–145)
TROPONIN I SERPL HS-MCNC: 8 NG/L
WBC # BLD AUTO: 11.5 X10(3) UL (ref 4–11)

## 2023-12-16 PROCEDURE — 71046 X-RAY EXAM CHEST 2 VIEWS: CPT | Performed by: EMERGENCY MEDICINE

## 2023-12-16 PROCEDURE — 71260 CT THORAX DX C+: CPT | Performed by: EMERGENCY MEDICINE

## 2023-12-16 RX ORDER — MORPHINE SULFATE 4 MG/ML
4 INJECTION, SOLUTION INTRAMUSCULAR; INTRAVENOUS EVERY 30 MIN PRN
Status: COMPLETED | OUTPATIENT
Start: 2023-12-16 | End: 2023-12-19

## 2023-12-16 RX ORDER — MORPHINE SULFATE 4 MG/ML
INJECTION, SOLUTION INTRAMUSCULAR; INTRAVENOUS
Status: COMPLETED
Start: 2023-12-16 | End: 2023-12-16

## 2023-12-16 RX ORDER — IOHEXOL 350 MG/ML
100 INJECTION, SOLUTION INTRAVENOUS
Status: COMPLETED | OUTPATIENT
Start: 2023-12-16 | End: 2023-12-16

## 2023-12-16 RX ORDER — SODIUM CHLORIDE 9 MG/ML
INJECTION, SOLUTION INTRAVENOUS CONTINUOUS
Status: DISCONTINUED | OUTPATIENT
Start: 2023-12-16 | End: 2023-12-20

## 2023-12-16 RX ORDER — IPRATROPIUM BROMIDE AND ALBUTEROL SULFATE 2.5; .5 MG/3ML; MG/3ML
3 SOLUTION RESPIRATORY (INHALATION) ONCE
Status: COMPLETED | OUTPATIENT
Start: 2023-12-16 | End: 2023-12-16

## 2023-12-16 RX ORDER — HYDROMORPHONE HYDROCHLORIDE 1 MG/ML
0.5 INJECTION, SOLUTION INTRAMUSCULAR; INTRAVENOUS; SUBCUTANEOUS EVERY 30 MIN PRN
Status: COMPLETED | OUTPATIENT
Start: 2023-12-16 | End: 2023-12-16

## 2023-12-17 PROBLEM — U07.1 ACUTE COVID-19: Status: ACTIVE | Noted: 2023-12-17

## 2023-12-17 PROBLEM — C15.9 MALIGNANT NEOPLASM OF ESOPHAGUS, UNSPECIFIED LOCATION (HCC): Status: ACTIVE | Noted: 2023-12-17

## 2023-12-17 PROBLEM — C15.9 MALIGNANT NEOPLASM OF ESOPHAGUS, UNSPECIFIED LOCATION (HCC): Status: ACTIVE | Noted: 2023-01-01

## 2023-12-17 PROBLEM — U07.1 ACUTE COVID-19: Status: ACTIVE | Noted: 2023-01-01

## 2023-12-17 LAB
ALBUMIN SERPL-MCNC: 2.7 G/DL (ref 3.4–5)
ALBUMIN/GLOB SERPL: 0.8 {RATIO} (ref 1–2)
ALP LIVER SERPL-CCNC: 104 U/L
ALT SERPL-CCNC: 26 U/L
ANION GAP SERPL CALC-SCNC: 1 MMOL/L (ref 0–18)
AST SERPL-CCNC: 23 U/L (ref 15–37)
ATRIAL RATE: 67 BPM
BASOPHILS # BLD AUTO: 0.02 X10(3) UL (ref 0–0.2)
BASOPHILS NFR BLD AUTO: 0.2 %
BILIRUB SERPL-MCNC: 0.5 MG/DL (ref 0.1–2)
BUN BLD-MCNC: 10 MG/DL (ref 9–23)
CALCIUM BLD-MCNC: 8.3 MG/DL (ref 8.5–10.1)
CHLORIDE SERPL-SCNC: 108 MMOL/L (ref 98–112)
CK SERPL-CCNC: 42 U/L
CO2 SERPL-SCNC: 30 MMOL/L (ref 21–32)
CREAT BLD-MCNC: 0.47 MG/DL
CRP SERPL-MCNC: 6.07 MG/DL (ref ?–0.3)
D DIMER PPP FEU-MCNC: 0.28 UG/ML FEU (ref ?–0.54)
DEPRECATED HBV CORE AB SER IA-ACNC: 183 NG/ML
EGFRCR SERPLBLD CKD-EPI 2021: 123 ML/MIN/1.73M2 (ref 60–?)
EOSINOPHIL # BLD AUTO: 0.22 X10(3) UL (ref 0–0.7)
EOSINOPHIL NFR BLD AUTO: 2.6 %
ERYTHROCYTE [DISTWIDTH] IN BLOOD BY AUTOMATED COUNT: 13.7 %
GLOBULIN PLAS-MCNC: 3.3 G/DL (ref 2.8–4.4)
GLUCOSE BLD-MCNC: 98 MG/DL (ref 70–99)
HCT VFR BLD AUTO: 31.6 %
HGB BLD-MCNC: 10.1 G/DL
IMM GRANULOCYTES # BLD AUTO: 0.08 X10(3) UL (ref 0–1)
IMM GRANULOCYTES NFR BLD: 0.9 %
LDH SERPL L TO P-CCNC: 125 U/L
LYMPHOCYTES # BLD AUTO: 0.48 X10(3) UL (ref 1–4)
LYMPHOCYTES NFR BLD AUTO: 5.7 %
MCH RBC QN AUTO: 31.7 PG (ref 26–34)
MCHC RBC AUTO-ENTMCNC: 32 G/DL (ref 31–37)
MCV RBC AUTO: 99.1 FL
MONOCYTES # BLD AUTO: 0.67 X10(3) UL (ref 0.1–1)
MONOCYTES NFR BLD AUTO: 7.9 %
NEUTROPHILS # BLD AUTO: 7.02 X10 (3) UL (ref 1.5–7.7)
NEUTROPHILS # BLD AUTO: 7.02 X10(3) UL (ref 1.5–7.7)
NEUTROPHILS NFR BLD AUTO: 82.7 %
NT-PROBNP SERPL-MCNC: 183 PG/ML (ref ?–125)
OSMOLALITY SERPL CALC.SUM OF ELEC: 287 MOSM/KG (ref 275–295)
P AXIS: 36 DEGREES
P-R INTERVAL: 126 MS
PLATELET # BLD AUTO: 131 10(3)UL (ref 150–450)
POTASSIUM SERPL-SCNC: 3.7 MMOL/L (ref 3.5–5.1)
PROCALCITONIN SERPL-MCNC: 0.16 NG/ML (ref ?–0.16)
PROT SERPL-MCNC: 6 G/DL (ref 6.4–8.2)
Q-T INTERVAL: 382 MS
QRS DURATION: 86 MS
QTC CALCULATION (BEZET): 403 MS
R AXIS: 34 DEGREES
RBC # BLD AUTO: 3.19 X10(6)UL
SODIUM SERPL-SCNC: 139 MMOL/L (ref 136–145)
STREPTOCOCCUS DNA BLD POS NAA+NON-PROBE: DETECTED
T AXIS: 66 DEGREES
VENTRICULAR RATE: 67 BPM
WBC # BLD AUTO: 8.5 X10(3) UL (ref 4–11)

## 2023-12-17 PROCEDURE — 99223 1ST HOSP IP/OBS HIGH 75: CPT | Performed by: HOSPITALIST

## 2023-12-17 RX ORDER — MORPHINE SULFATE 2 MG/ML
1 INJECTION, SOLUTION INTRAMUSCULAR; INTRAVENOUS EVERY 2 HOUR PRN
Status: DISCONTINUED | OUTPATIENT
Start: 2023-12-17 | End: 2023-12-20

## 2023-12-17 RX ORDER — HYDRALAZINE HYDROCHLORIDE 20 MG/ML
10 INJECTION INTRAMUSCULAR; INTRAVENOUS EVERY 6 HOURS PRN
Status: DISCONTINUED | OUTPATIENT
Start: 2023-12-17 | End: 2023-12-20

## 2023-12-17 RX ORDER — ALBUTEROL SULFATE 90 UG/1
4 AEROSOL, METERED RESPIRATORY (INHALATION) EVERY 4 HOURS PRN
Status: DISCONTINUED | OUTPATIENT
Start: 2023-12-17 | End: 2023-12-20

## 2023-12-17 RX ORDER — SODIUM CHLORIDE 9 MG/ML
INJECTION, SOLUTION INTRAVENOUS CONTINUOUS
Status: DISCONTINUED | OUTPATIENT
Start: 2023-12-17 | End: 2023-12-20

## 2023-12-17 RX ORDER — METOPROLOL SUCCINATE 50 MG/1
50 TABLET, EXTENDED RELEASE ORAL
Status: DISCONTINUED | OUTPATIENT
Start: 2023-12-17 | End: 2023-12-20

## 2023-12-17 RX ORDER — IPRATROPIUM BROMIDE AND ALBUTEROL SULFATE 2.5; .5 MG/3ML; MG/3ML
3 SOLUTION RESPIRATORY (INHALATION) EVERY 6 HOURS PRN
Status: DISCONTINUED | OUTPATIENT
Start: 2023-12-17 | End: 2023-12-20

## 2023-12-17 RX ORDER — PANTOPRAZOLE SODIUM 40 MG/1
40 TABLET, DELAYED RELEASE ORAL
Status: DISCONTINUED | OUTPATIENT
Start: 2023-12-17 | End: 2023-12-17

## 2023-12-17 RX ORDER — ONDANSETRON 2 MG/ML
4 INJECTION INTRAMUSCULAR; INTRAVENOUS EVERY 6 HOURS PRN
Status: DISCONTINUED | OUTPATIENT
Start: 2023-12-17 | End: 2023-12-20

## 2023-12-17 RX ORDER — PANTOPRAZOLE SODIUM 40 MG/1
40 TABLET, DELAYED RELEASE ORAL
Status: DISCONTINUED | OUTPATIENT
Start: 2023-12-17 | End: 2023-12-20

## 2023-12-17 RX ORDER — PROCHLORPERAZINE EDISYLATE 5 MG/ML
5 INJECTION INTRAMUSCULAR; INTRAVENOUS EVERY 8 HOURS PRN
Status: DISCONTINUED | OUTPATIENT
Start: 2023-12-17 | End: 2023-12-18

## 2023-12-17 RX ORDER — SERTRALINE HYDROCHLORIDE 100 MG/1
100 TABLET, FILM COATED ORAL DAILY
Status: DISCONTINUED | OUTPATIENT
Start: 2023-12-17 | End: 2023-12-20

## 2023-12-17 RX ORDER — METOPROLOL TARTRATE 1 MG/ML
5 INJECTION, SOLUTION INTRAVENOUS EVERY 4 HOURS PRN
Status: DISCONTINUED | OUTPATIENT
Start: 2023-12-17 | End: 2023-12-20

## 2023-12-17 RX ORDER — GUAIFENESIN 600 MG/1
600 TABLET, EXTENDED RELEASE ORAL 2 TIMES DAILY
Status: DISCONTINUED | OUTPATIENT
Start: 2023-12-17 | End: 2023-12-20

## 2023-12-17 RX ORDER — MELATONIN
3 NIGHTLY PRN
Status: DISCONTINUED | OUTPATIENT
Start: 2023-12-17 | End: 2023-12-20

## 2023-12-17 RX ORDER — MORPHINE SULFATE 2 MG/ML
2 INJECTION, SOLUTION INTRAMUSCULAR; INTRAVENOUS EVERY 2 HOUR PRN
Status: DISCONTINUED | OUTPATIENT
Start: 2023-12-17 | End: 2023-12-20

## 2023-12-17 RX ORDER — ENOXAPARIN SODIUM 100 MG/ML
40 INJECTION SUBCUTANEOUS DAILY
Status: DISCONTINUED | OUTPATIENT
Start: 2023-12-17 | End: 2023-12-20

## 2023-12-17 RX ORDER — MORPHINE SULFATE 4 MG/ML
4 INJECTION, SOLUTION INTRAMUSCULAR; INTRAVENOUS EVERY 2 HOUR PRN
Status: DISCONTINUED | OUTPATIENT
Start: 2023-12-17 | End: 2023-12-20

## 2023-12-17 NOTE — ED INITIAL ASSESSMENT (HPI)
Pt to ED for c/o right-sided chest pain, difficulty breathing and \"backing up\" of anything taken PO since hospital discharge on Wed 12/13. Hx of Esophageal CA and had esophageal stent removed during most recent hospital admission.

## 2023-12-17 NOTE — PLAN OF CARE
Pt Aox4. VSS. RA. Reported headache and throat pain due to cough. Morphine 4mg PRN administered. Cold packs provided. Up independent. Will continue plan of care.        Problem: GASTROINTESTINAL - ADULT  Goal: Minimal or absence of nausea and vomiting  Description: INTERVENTIONS:  - Maintain adequate hydration with IV or PO as ordered and tolerated  - Nasogastric tube to low intermittent suction as ordered  - Evaluate effectiveness of ordered antiemetic medications  - Provide nonpharmacologic comfort measures as appropriate  - Advance diet as tolerated, if ordered  - Obtain nutritional consult as needed  - Evaluate fluid balance  Outcome: Progressing  Goal: Maintains adequate nutritional intake (undernourished)  Description: INTERVENTIONS:  - Monitor percentage of each meal consumed  - Identify factors contributing to decreased intake, treat as appropriate  - Assist with meals as needed  - Monitor I&O, WT and lab values  - Obtain nutritional consult as needed  - Optimize oral hygiene and moisture  - Encourage food from home; allow for food preferences  - Enhance eating environment  Outcome: Progressing     Problem: PAIN - ADULT  Goal: Verbalizes/displays adequate comfort level or patient's stated pain goal  Description: INTERVENTIONS:  - Encourage pt to monitor pain and request assistance  - Assess pain using appropriate pain scale  - Administer analgesics based on type and severity of pain and evaluate response  - Implement non-pharmacological measures as appropriate and evaluate response  - Consider cultural and social influences on pain and pain management  - Manage/alleviate anxiety  - Utilize distraction and/or relaxation techniques  - Monitor for opioid side effects  - Notify MD/LIP if interventions unsuccessful or patient reports new pain  - Anticipate increased pain with activity and pre-medicate as appropriate  Outcome: Progressing

## 2023-12-17 NOTE — ED QUICK NOTES
Orders for admission, patient is aware of plan and ready to go upstairs. Any questions, please call ED RN Jackie Lozano at extension [phone].      Patient Covid vaccination status: Fully vaccinated     COVID Test Ordered in ED: SARS-CoV-2/Flu A and B/RSV by PCR (GeneXpert)    COVID Suspicion at Admission: N/A    Running Infusions:    sodium chloride 125 mL/hr at 12/16/23 2040        Mental Status/LOC at time of transport: A&O x4    Other pertinent information:   CIWA score: N/A   NIH score:  N/A

## 2023-12-17 NOTE — PLAN OF CARE
NURSING ADMISSION NOTE    Patient admitted via Cart  Oriented to room. Safety precautions initiated. Bed in low position. Call light in reach. Assumed care of pt at 0130. Pt a/o x4. Hypertensive, PRN lopressor given. Reporting severe Rt chest pain, PRN morphine given w/ good improvement. Strict NPO. Recent admission for stent migration w/ removal. Reporting difficulty swallowing & vomiting when drinking. IVF infusing per MAR. Admission navigator completed. Updated on progressing POC, verbalized understanding. Call light within reach. Safety precautions in place. Consults placed to GI & ID.    Problem: PAIN - ADULT  Goal: Verbalizes/displays adequate comfort level or patient's stated pain goal  Description: INTERVENTIONS:  - Encourage pt to monitor pain and request assistance  - Assess pain using appropriate pain scale  - Administer analgesics based on type and severity of pain and evaluate response  - Implement non-pharmacological measures as appropriate and evaluate response  - Consider cultural and social influences on pain and pain management  - Manage/alleviate anxiety  - Utilize distraction and/or relaxation techniques  - Monitor for opioid side effects  - Notify MD/LIP if interventions unsuccessful or patient reports new pain  - Anticipate increased pain with activity and pre-medicate as appropriate  Outcome: Progressing     Problem: Impaired Swallowing  Goal: Minimize aspiration risk  Description: Interventions:  - Patient should be alert and upright for all feedings (90 degrees preferred)  - Offer food and liquids at a slow rate  - No straws  - Encourage small bites of food and small sips of liquid  - Offer pills one at a time, crush or deliver with applesauce as needed  - Discontinue feeding and notify MD (or speech pathologist) if coughing or persistent throat clearing or wet/gurgly vocal quality is noted  Outcome: Progressing     Problem: GASTROINTESTINAL - ADULT  Goal: Minimal or absence of nausea and vomiting  Description: INTERVENTIONS:  - Maintain adequate hydration with IV or PO as ordered and tolerated  - Nasogastric tube to low intermittent suction as ordered  - Evaluate effectiveness of ordered antiemetic medications  - Provide nonpharmacologic comfort measures as appropriate  - Advance diet as tolerated, if ordered  - Obtain nutritional consult as needed  - Evaluate fluid balance  Outcome: Progressing

## 2023-12-18 ENCOUNTER — APPOINTMENT (OUTPATIENT)
Dept: HEMATOLOGY/ONCOLOGY | Age: 53
End: 2023-12-18
Attending: INTERNAL MEDICINE
Payer: COMMERCIAL

## 2023-12-18 ENCOUNTER — APPOINTMENT (OUTPATIENT)
Dept: GENERAL RADIOLOGY | Facility: HOSPITAL | Age: 53
End: 2023-12-18
Attending: STUDENT IN AN ORGANIZED HEALTH CARE EDUCATION/TRAINING PROGRAM
Payer: COMMERCIAL

## 2023-12-18 LAB
ALBUMIN SERPL-MCNC: 2.8 G/DL (ref 3.4–5)
ALBUMIN/GLOB SERPL: 0.8 {RATIO} (ref 1–2)
ALP LIVER SERPL-CCNC: 105 U/L
ALT SERPL-CCNC: 22 U/L
ANION GAP SERPL CALC-SCNC: 6 MMOL/L (ref 0–18)
AST SERPL-CCNC: 17 U/L (ref 15–37)
BASOPHILS # BLD AUTO: 0.03 X10(3) UL (ref 0–0.2)
BASOPHILS NFR BLD AUTO: 0.5 %
BILIRUB SERPL-MCNC: 0.6 MG/DL (ref 0.1–2)
BUN BLD-MCNC: 8 MG/DL (ref 9–23)
CALCIUM BLD-MCNC: 8.6 MG/DL (ref 8.5–10.1)
CHLORIDE SERPL-SCNC: 102 MMOL/L (ref 98–112)
CO2 SERPL-SCNC: 28 MMOL/L (ref 21–32)
CREAT BLD-MCNC: 0.41 MG/DL
CRP SERPL-MCNC: 11.8 MG/DL (ref ?–0.3)
D DIMER PPP FEU-MCNC: 0.28 UG/ML FEU (ref ?–0.54)
DEPRECATED HBV CORE AB SER IA-ACNC: 267.4 NG/ML
EGFRCR SERPLBLD CKD-EPI 2021: 129 ML/MIN/1.73M2 (ref 60–?)
EOSINOPHIL # BLD AUTO: 0.18 X10(3) UL (ref 0–0.7)
EOSINOPHIL NFR BLD AUTO: 2.7 %
ERYTHROCYTE [DISTWIDTH] IN BLOOD BY AUTOMATED COUNT: 13.4 %
GLOBULIN PLAS-MCNC: 3.5 G/DL (ref 2.8–4.4)
GLUCOSE BLD-MCNC: 83 MG/DL (ref 70–99)
HCT VFR BLD AUTO: 33.8 %
HGB BLD-MCNC: 11 G/DL
IMM GRANULOCYTES # BLD AUTO: 0.06 X10(3) UL (ref 0–1)
IMM GRANULOCYTES NFR BLD: 0.9 %
LDH SERPL L TO P-CCNC: 127 U/L
LYMPHOCYTES # BLD AUTO: 0.48 X10(3) UL (ref 1–4)
LYMPHOCYTES NFR BLD AUTO: 7.3 %
MCH RBC QN AUTO: 31.4 PG (ref 26–34)
MCHC RBC AUTO-ENTMCNC: 32.5 G/DL (ref 31–37)
MCV RBC AUTO: 96.6 FL
MONOCYTES # BLD AUTO: 0.66 X10(3) UL (ref 0.1–1)
MONOCYTES NFR BLD AUTO: 10.1 %
NEUTROPHILS # BLD AUTO: 5.15 X10 (3) UL (ref 1.5–7.7)
NEUTROPHILS # BLD AUTO: 5.15 X10(3) UL (ref 1.5–7.7)
NEUTROPHILS NFR BLD AUTO: 78.5 %
OSMOLALITY SERPL CALC.SUM OF ELEC: 279 MOSM/KG (ref 275–295)
PLATELET # BLD AUTO: 136 10(3)UL (ref 150–450)
POTASSIUM SERPL-SCNC: 3.6 MMOL/L (ref 3.5–5.1)
PROT SERPL-MCNC: 6.3 G/DL (ref 6.4–8.2)
RBC # BLD AUTO: 3.5 X10(6)UL
SODIUM SERPL-SCNC: 136 MMOL/L (ref 136–145)
WBC # BLD AUTO: 6.6 X10(3) UL (ref 4–11)

## 2023-12-18 PROCEDURE — 99232 SBSQ HOSP IP/OBS MODERATE 35: CPT | Performed by: HOSPITALIST

## 2023-12-18 PROCEDURE — 99255 IP/OBS CONSLTJ NEW/EST HI 80: CPT | Performed by: INTERNAL MEDICINE

## 2023-12-18 PROCEDURE — 74246 X-RAY XM UPR GI TRC 2CNTRST: CPT | Performed by: STUDENT IN AN ORGANIZED HEALTH CARE EDUCATION/TRAINING PROGRAM

## 2023-12-18 PROCEDURE — 74240 X-RAY XM UPR GI TRC 1CNTRST: CPT | Performed by: STUDENT IN AN ORGANIZED HEALTH CARE EDUCATION/TRAINING PROGRAM

## 2023-12-18 RX ORDER — SODIUM CHLORIDE FOR INHALATION 3 %
3 VIAL, NEBULIZER (ML) INHALATION EVERY 8 HOURS
Status: DISCONTINUED | OUTPATIENT
Start: 2023-12-18 | End: 2023-12-19

## 2023-12-18 RX ORDER — METOCLOPRAMIDE HYDROCHLORIDE 5 MG/ML
10 INJECTION INTRAMUSCULAR; INTRAVENOUS EVERY 6 HOURS
Status: DISCONTINUED | OUTPATIENT
Start: 2023-12-18 | End: 2023-12-20

## 2023-12-18 RX ORDER — IPRATROPIUM BROMIDE AND ALBUTEROL SULFATE 2.5; .5 MG/3ML; MG/3ML
3 SOLUTION RESPIRATORY (INHALATION) EVERY 8 HOURS
Status: DISCONTINUED | OUTPATIENT
Start: 2023-12-18 | End: 2023-12-19

## 2023-12-18 NOTE — TELEPHONE ENCOUNTER
Per PCP ok to add pt to 2:20 on 12/19/23 as double book. Scheduled appt. Per PCP request.   Pt currently inpatient r/t intractable vomiting. Will cx appt if pt still Inpatient tomorrow. MyChart sent to notify pt of appt and explain we are aware he is currently admitted. Notified PSR LeadNolberto of possible need to cx. Appt if pt remains inpt.

## 2023-12-18 NOTE — PLAN OF CARE
Pt Aox4. VSS. RA. Reported headache and throat pain due to cough. Morphine 4mg PRN administered. Cold packs provided. Up independent. X-ray UGI  done today. Blood culture peripheral/central line. Sputum culture collected per order. Will continue plan of care.      Problem: GASTROINTESTINAL - ADULT  Goal: Minimal or absence of nausea and vomiting  Description: INTERVENTIONS:  - Maintain adequate hydration with IV or PO as ordered and tolerated  - Nasogastric tube to low intermittent suction as ordered  - Evaluate effectiveness of ordered antiemetic medications  - Provide nonpharmacologic comfort measures as appropriate  - Advance diet as tolerated, if ordered  - Obtain nutritional consult as needed  - Evaluate fluid balance  Outcome: Progressing  Goal: Maintains adequate nutritional intake (undernourished)  Description: INTERVENTIONS:  - Monitor percentage of each meal consumed  - Identify factors contributing to decreased intake, treat as appropriate  - Assist with meals as needed  - Monitor I&O, WT and lab values  - Obtain nutritional consult as needed  - Optimize oral hygiene and moisture  - Encourage food from home; allow for food preferences  - Enhance eating environment  Outcome: Progressing     Problem: PAIN - ADULT  Goal: Verbalizes/displays adequate comfort level or patient's stated pain goal  Description: INTERVENTIONS:  - Encourage pt to monitor pain and request assistance  - Assess pain using appropriate pain scale  - Administer analgesics based on type and severity of pain and evaluate response  - Implement non-pharmacological measures as appropriate and evaluate response  - Consider cultural and social influences on pain and pain management  - Manage/alleviate anxiety  - Utilize distraction and/or relaxation techniques  - Monitor for opioid side effects  - Notify MD/LIP if interventions unsuccessful or patient reports new pain  - Anticipate increased pain with activity and pre-medicate as appropriate  Outcome: Progressing

## 2023-12-18 NOTE — PLAN OF CARE
Pt received A&Ox4. VSS. RA. Afebrile. Pt c/o head/throat pain, morphine given prn. Positive blood cultures, ID notified - started on Unasyn. Medications given per MAR. IVF. NPO. Cough w/ sputum. Call light within reach.      Problem: Impaired Swallowing  Goal: Minimize aspiration risk  Description: Interventions:  - Patient should be alert and upright for all feedings (90 degrees preferred)  - Offer food and liquids at a slow rate  - No straws  - Encourage small bites of food and small sips of liquid  - Offer pills one at a time, crush or deliver with applesauce as needed  - Discontinue feeding and notify MD (or speech pathologist) if coughing or persistent throat clearing or wet/gurgly vocal quality is noted  Outcome: Progressing     Problem: GASTROINTESTINAL - ADULT  Goal: Minimal or absence of nausea and vomiting  Description: INTERVENTIONS:  - Maintain adequate hydration with IV or PO as ordered and tolerated  - Nasogastric tube to low intermittent suction as ordered  - Evaluate effectiveness of ordered antiemetic medications  - Provide nonpharmacologic comfort measures as appropriate  - Advance diet as tolerated, if ordered  - Obtain nutritional consult as needed  - Evaluate fluid balance  Outcome: Progressing  Goal: Maintains adequate nutritional intake (undernourished)  Description: INTERVENTIONS:  - Monitor percentage of each meal consumed  - Identify factors contributing to decreased intake, treat as appropriate  - Assist with meals as needed  - Monitor I&O, WT and lab values  - Obtain nutritional consult as needed  - Optimize oral hygiene and moisture  - Encourage food from home; allow for food preferences  - Enhance eating environment  Outcome: Progressing     Problem: PAIN - ADULT  Goal: Verbalizes/displays adequate comfort level or patient's stated pain goal  Description: INTERVENTIONS:  - Encourage pt to monitor pain and request assistance  - Assess pain using appropriate pain scale  - Administer analgesics based on type and severity of pain and evaluate response  - Implement non-pharmacological measures as appropriate and evaluate response  - Consider cultural and social influences on pain and pain management  - Manage/alleviate anxiety  - Utilize distraction and/or relaxation techniques  - Monitor for opioid side effects  - Notify MD/LIP if interventions unsuccessful or patient reports new pain  - Anticipate increased pain with activity and pre-medicate as appropriate  Outcome: Progressing

## 2023-12-19 LAB
ALBUMIN SERPL-MCNC: 2.6 G/DL (ref 3.4–5)
ALBUMIN/GLOB SERPL: 0.7 {RATIO} (ref 1–2)
ALP LIVER SERPL-CCNC: 100 U/L
ALT SERPL-CCNC: 21 U/L
ANION GAP SERPL CALC-SCNC: 5 MMOL/L (ref 0–18)
AST SERPL-CCNC: 17 U/L (ref 15–37)
BASOPHILS # BLD AUTO: 0.03 X10(3) UL (ref 0–0.2)
BASOPHILS NFR BLD AUTO: 0.6 %
BILIRUB SERPL-MCNC: 0.4 MG/DL (ref 0.1–2)
BUN BLD-MCNC: 8 MG/DL (ref 9–23)
CALCIUM BLD-MCNC: 8.6 MG/DL (ref 8.5–10.1)
CHLORIDE SERPL-SCNC: 105 MMOL/L (ref 98–112)
CO2 SERPL-SCNC: 28 MMOL/L (ref 21–32)
CREAT BLD-MCNC: 0.44 MG/DL
CRP SERPL-MCNC: 12.4 MG/DL (ref ?–0.3)
D DIMER PPP FEU-MCNC: 0.38 UG/ML FEU (ref ?–0.54)
DEPRECATED HBV CORE AB SER IA-ACNC: 297.3 NG/ML
EGFRCR SERPLBLD CKD-EPI 2021: 126 ML/MIN/1.73M2 (ref 60–?)
EOSINOPHIL # BLD AUTO: 0.12 X10(3) UL (ref 0–0.7)
EOSINOPHIL NFR BLD AUTO: 2.2 %
ERYTHROCYTE [DISTWIDTH] IN BLOOD BY AUTOMATED COUNT: 13.5 %
GLOBULIN PLAS-MCNC: 3.7 G/DL (ref 2.8–4.4)
GLUCOSE BLD-MCNC: 99 MG/DL (ref 70–99)
HCT VFR BLD AUTO: 33.1 %
HGB BLD-MCNC: 11 G/DL
IMM GRANULOCYTES # BLD AUTO: 0.1 X10(3) UL (ref 0–1)
IMM GRANULOCYTES NFR BLD: 1.9 %
LDH SERPL L TO P-CCNC: 116 U/L
LYMPHOCYTES # BLD AUTO: 0.55 X10(3) UL (ref 1–4)
LYMPHOCYTES NFR BLD AUTO: 10.3 %
MCH RBC QN AUTO: 31.4 PG (ref 26–34)
MCHC RBC AUTO-ENTMCNC: 33.2 G/DL (ref 31–37)
MCV RBC AUTO: 94.6 FL
MONOCYTES # BLD AUTO: 0.7 X10(3) UL (ref 0.1–1)
MONOCYTES NFR BLD AUTO: 13.1 %
NEUTROPHILS # BLD AUTO: 3.86 X10 (3) UL (ref 1.5–7.7)
NEUTROPHILS # BLD AUTO: 3.86 X10(3) UL (ref 1.5–7.7)
NEUTROPHILS NFR BLD AUTO: 71.9 %
OSMOLALITY SERPL CALC.SUM OF ELEC: 284 MOSM/KG (ref 275–295)
PLATELET # BLD AUTO: 158 10(3)UL (ref 150–450)
POTASSIUM SERPL-SCNC: 3.6 MMOL/L (ref 3.5–5.1)
PROT SERPL-MCNC: 6.3 G/DL (ref 6.4–8.2)
RBC # BLD AUTO: 3.5 X10(6)UL
SODIUM SERPL-SCNC: 138 MMOL/L (ref 136–145)
WBC # BLD AUTO: 5.4 X10(3) UL (ref 4–11)

## 2023-12-19 PROCEDURE — 99232 SBSQ HOSP IP/OBS MODERATE 35: CPT | Performed by: HOSPITALIST

## 2023-12-19 PROCEDURE — 99233 SBSQ HOSP IP/OBS HIGH 50: CPT | Performed by: INTERNAL MEDICINE

## 2023-12-19 RX ORDER — SUCRALFATE ORAL 1 G/10ML
1 SUSPENSION ORAL
Status: DISCONTINUED | OUTPATIENT
Start: 2023-12-19 | End: 2023-12-20

## 2023-12-19 RX ORDER — SUCRALFATE ORAL 1 G/10ML
1 SUSPENSION ORAL
Qty: 800 ML | Refills: 0 | Status: SHIPPED | OUTPATIENT
Start: 2023-12-19 | End: 2023-12-22 | Stop reason: ALTCHOICE

## 2023-12-19 RX ORDER — BENZONATATE 100 MG/1
100 CAPSULE ORAL 3 TIMES DAILY PRN
Status: DISCONTINUED | OUTPATIENT
Start: 2023-12-19 | End: 2023-12-20

## 2023-12-19 RX ORDER — IPRATROPIUM BROMIDE AND ALBUTEROL SULFATE 2.5; .5 MG/3ML; MG/3ML
3 SOLUTION RESPIRATORY (INHALATION) EVERY 8 HOURS
Qty: 168 ML | Refills: 1 | Status: SHIPPED | OUTPATIENT
Start: 2023-12-19

## 2023-12-19 RX ORDER — SODIUM CHLORIDE FOR INHALATION 3 %
3 VIAL, NEBULIZER (ML) INHALATION AS NEEDED
Status: DISCONTINUED | OUTPATIENT
Start: 2023-12-19 | End: 2023-12-20

## 2023-12-19 RX ORDER — SODIUM CHLORIDE FOR INHALATION 3 %
3 VIAL, NEBULIZER (ML) INHALATION EVERY 8 HOURS
Qty: 360 ML | Refills: 1 | Status: SHIPPED | OUTPATIENT
Start: 2023-12-19

## 2023-12-19 NOTE — PLAN OF CARE
Pt alert and oriented 4. VSS. Afebrile. Complaining cough and throat pain 8-9/10. PRN Morphine administered. Safety precautions in place. Call light within reach.      Problem: Impaired Swallowing  Goal: Minimize aspiration risk  Description: Interventions:  - Patient should be alert and upright for all feedings (90 degrees preferred)  - Offer food and liquids at a slow rate  - No straws  - Encourage small bites of food and small sips of liquid  - Offer pills one at a time, crush or deliver with applesauce as needed  - Discontinue feeding and notify MD (or speech pathologist) if coughing or persistent throat clearing or wet/gurgly vocal quality is noted  Outcome: Progressing     Problem: GASTROINTESTINAL - ADULT  Goal: Minimal or absence of nausea and vomiting  Description: INTERVENTIONS:  - Maintain adequate hydration with IV or PO as ordered and tolerated  - Nasogastric tube to low intermittent suction as ordered  - Evaluate effectiveness of ordered antiemetic medications  - Provide nonpharmacologic comfort measures as appropriate  - Advance diet as tolerated, if ordered  - Obtain nutritional consult as needed  - Evaluate fluid balance  Outcome: Progressing

## 2023-12-19 NOTE — RESPIRATORY THERAPY NOTE
Gave pt duo and 3% breathing tx @1524 as scheduled. After assessing, pt requested that the nebs be changed to an as needed basis because they felt that the tx didn't make a difference. Changed pt to PRN breathing tx for duo and 3% nebs. Pt has had more productive coughs with no difficulty. Breath sounds remain diminished w/no wheezing or rhonchi noted at time of assessment. Pt also stated that he is supposed to be leaving today. Will continue to monitor until pt gets DC'd.

## 2023-12-19 NOTE — PLAN OF CARE
Pt alert and oriented X4. VSS. Pt CO of pain, Q2 pain meds given upon request. All meds given per MAR. IVF infusing. IVABX given per MAR. Pt got scheduled nebs via RT. Isolation precautions in place. Fall precautions in place, call light in reach.      Problem: GASTROINTESTINAL - ADULT  Goal: Minimal or absence of nausea and vomiting  Description: INTERVENTIONS:  - Maintain adequate hydration with IV or PO as ordered and tolerated  - Nasogastric tube to low intermittent suction as ordered  - Evaluate effectiveness of ordered antiemetic medications  - Provide nonpharmacologic comfort measures as appropriate  - Advance diet as tolerated, if ordered  - Obtain nutritional consult as needed  - Evaluate fluid balance  Outcome: Progressing

## 2023-12-20 VITALS
RESPIRATION RATE: 20 BRPM | HEIGHT: 74 IN | BODY MASS INDEX: 23.74 KG/M2 | OXYGEN SATURATION: 94 % | DIASTOLIC BLOOD PRESSURE: 83 MMHG | WEIGHT: 185 LBS | HEART RATE: 57 BPM | SYSTOLIC BLOOD PRESSURE: 151 MMHG | TEMPERATURE: 98 F

## 2023-12-20 LAB
ALBUMIN SERPL-MCNC: 2.5 G/DL (ref 3.4–5)
ALBUMIN/GLOB SERPL: 0.7 {RATIO} (ref 1–2)
ALP LIVER SERPL-CCNC: 93 U/L
ALT SERPL-CCNC: 18 U/L
ANION GAP SERPL CALC-SCNC: 1 MMOL/L (ref 0–18)
AST SERPL-CCNC: 15 U/L (ref 15–37)
BILIRUB SERPL-MCNC: 0.3 MG/DL (ref 0.1–2)
BUN BLD-MCNC: 6 MG/DL (ref 9–23)
CALCIUM BLD-MCNC: 8.3 MG/DL (ref 8.5–10.1)
CHLORIDE SERPL-SCNC: 105 MMOL/L (ref 98–112)
CO2 SERPL-SCNC: 31 MMOL/L (ref 21–32)
CREAT BLD-MCNC: 0.54 MG/DL
EGFRCR SERPLBLD CKD-EPI 2021: 118 ML/MIN/1.73M2 (ref 60–?)
GLOBULIN PLAS-MCNC: 3.5 G/DL (ref 2.8–4.4)
GLUCOSE BLD-MCNC: 130 MG/DL (ref 70–99)
OSMOLALITY SERPL CALC.SUM OF ELEC: 283 MOSM/KG (ref 275–295)
POTASSIUM SERPL-SCNC: 3.5 MMOL/L (ref 3.5–5.1)
PROT SERPL-MCNC: 6 G/DL (ref 6.4–8.2)
SODIUM SERPL-SCNC: 137 MMOL/L (ref 136–145)

## 2023-12-20 PROCEDURE — 99232 SBSQ HOSP IP/OBS MODERATE 35: CPT | Performed by: INTERNAL MEDICINE

## 2023-12-20 PROCEDURE — 99239 HOSP IP/OBS DSCHRG MGMT >30: CPT | Performed by: HOSPITALIST

## 2023-12-20 RX ORDER — HYDROCODONE BITARTRATE AND ACETAMINOPHEN 5; 325 MG/1; MG/1
1-2 TABLET ORAL EVERY 8 HOURS PRN
Qty: 10 TABLET | Refills: 0 | Status: SHIPPED | OUTPATIENT
Start: 2023-12-20 | End: 2023-12-22

## 2023-12-20 RX ORDER — BENZONATATE 100 MG/1
100 CAPSULE ORAL 3 TIMES DAILY PRN
Qty: 30 CAPSULE | Refills: 0 | Status: SHIPPED | OUTPATIENT
Start: 2023-12-20

## 2023-12-20 RX ORDER — AMOXICILLIN AND CLAVULANATE POTASSIUM 875; 125 MG/1; MG/1
1 TABLET, FILM COATED ORAL 2 TIMES DAILY
Qty: 14 TABLET | Refills: 0 | Status: SHIPPED | OUTPATIENT
Start: 2023-12-20 | End: 2023-12-27

## 2023-12-20 NOTE — PLAN OF CARE
Pt alert and oriented X4. VSS. Pt co of pain, PRN meds given. All meds given per [facility]. IVF infusing. IVABX given. PRN tessalon given. Pt OK to DC when cleared from ID. DC orders in place. All paperwork provided. All questions answered. Port needle removed. Fall precautions in place, call light in reach.         Problem: GASTROINTESTINAL - ADULT  Goal: Minimal or absence of nausea and vomiting  Description: INTERVENTIONS:  - Maintain adequate hydration with IV or PO as ordered and tolerated  - Nasogastric tube to low intermittent suction as ordered  - Evaluate effectiveness of ordered antiemetic medications  - Provide nonpharmacologic comfort measures as appropriate  - Advance diet as tolerated, if ordered  - Obtain nutritional consult as needed  - Evaluate fluid balance  Outcome: Progressing

## 2023-12-20 NOTE — PLAN OF CARE
Patient alert and oriented x 4, lungs clear, diminished, room air, productive cough, abdomen soft, bowel sounds present, voids, ambulatory independently in room, medications per MAR, medicated for pain q 2 hours, IVF infusing.

## 2023-12-20 NOTE — CDS QUERY
Dillan Meyer  Dear Doctor: Bertina Nyhan information (provided below) includes documentation of a Covid-19 Infection.                   BASED ON YOUR CLINICAL JUDGEMENT, PLEASE SELECT                                THE MOST APPROPRIATE DIAGNOSIS:     (  x  ) Covid-19 is a current & active infection   (    ) Covid-19 is not a current infection, but the coughing is a residual effect/sequelae of the Covid-19     (    ) Covid-19 is not an active infection (personal history of Covid-19 infection only), and the coughing is unrelated to the prior Covid-19 infection   (    ) Other (please comment )______________________________________  ____________________________________________________________________________                                                    Documentation from the Medical Record:  Risk Factors 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  -No hypoxia so no role for steroids or remdesivir. -Infectious disease on consult.  >12/17/23- ID consult -ASSESSMENT:   Mild COVID-19 infection (12/10/23) Nausea/Emesis PLAN:   -Continue to hold COVID-19 treatment -GI service consulted to  evaluate nausea/emesis  >12/18/23 Pulm- ASSESSMENT:  Acute cough, most suspicious for GI source, noted significant GERD on GI study, perhaps exacerbated post stent removal.  Chest/abdominal pain Bilateral nodules/infiltrates suspect related to aspiration episodes and/or COVID moreso than malignancy  COVID infection/pneumonia - diagnosed 12/10/2023  >12/18/23- Mild COVID-19 infection (12/10/23) -Continue to hold COVID-19 treatment    1216/23 SARS -CoV2 (COVID-19) (GeneXpert) Detected     Treatment - ID consult, Pulmonary consult, Isolation precautions    For questions regarding this query, please contact Clinical Documentation :  Arcelia Gaxiola RN/BSN  [phone] [place] [address]                      Thank you!                                                            THIS FORM IS A PERMANENT PART OF THE MEDICAL RECORD

## 2023-12-21 ENCOUNTER — PATIENT OUTREACH (OUTPATIENT)
Dept: CASE MANAGEMENT | Age: 53
End: 2023-12-21

## 2023-12-21 ENCOUNTER — PATIENT MESSAGE (OUTPATIENT)
Dept: HEMATOLOGY/ONCOLOGY | Age: 53
End: 2023-12-21

## 2023-12-21 DIAGNOSIS — Z02.9 ENCOUNTERS FOR UNSPECIFIED ADMINISTRATIVE PURPOSE: Primary | ICD-10-CM

## 2023-12-21 NOTE — TELEPHONE ENCOUNTER
From: Anjelica Verdin.   To: Apolinar Forrest  Sent: 12/21/2023 6:08 AM CST  Subject: Smita Neff     Can you look over all my Medication what to keep taking

## 2023-12-21 NOTE — DISCHARGE SUMMARY
[facility]  DISCHARGE SUMMARY     Rebecca Boyd Patient Status:  Inpatient    10/15/1970 MRN [id number]   [facility] 4NW-A Attending No att. providers found   Hosp Day # 3 PCP Anselmo Zarate MD     Date of Admission: 2023  Date of Discharge:  2023     Discharge Disposition: Home or Self Care    Discharge Diagnosis:    #Intractable nausea vomiting  #COVID 19 infection  #Alpha hemolytic strep bacteremia  #Pancreatic and esophageal cancer  # Hypertension  # Depression  #CAD s/p ABG    History of Present Illness: Rebecca Boyd is a 46year old male with history of pancreatic esophageal cancer recently had esophageal stent removed due to migration. Patient states that shortly after being discharged patient started having a lot of coughing he said he has been able to take in solid food with no problem but if he drinks water too fast he vomits. No fevers, chills, diarrhea, constipation. Pulmonology, infectious disease and gastroenterology during this hospitalization. His symptoms improved. He was off oxygen at time of discharge. He will be on Augmentin at discharge for another 7 days. He was encouraged to follow-up with his PCP after discharge. All questions and concerns were addressed with patient prior to discharge, he is agreeable to plan of care as stated. He is encouraged to return to the ER for symptoms come back or worsen. Brief Synopsis:   Patient presented with nausea and vomiting. Found to have COVID-19 infection, as well as alphahemolytic Streptococcus bacteremia. He was seen by    [place]+ Score: 81  59-90 High Risk  29-58 Medium Risk  0-28   Low Risk       TCM Follow-Up Recommendation:  LACE > 58:  High Risk of readmission after discharge from the hospital.      Procedures during hospitalization:   None    Incidental or significant findings and recommendations (brief descriptions):  None    Lab/Test results pending at Discharge: N/a    Consultants:  Pulm, ID, GI    Discharge Medication List:     Discharge Medications        START taking these medications        Instructions Prescription details   amoxicillin clavulanate 875-125 MG Tabs  Commonly known as: Augmentin      Take 1 tablet by mouth 2 (two) times daily for 7 days. Stop taking on: December 27, 2023  Quantity: 14 tablet  Refills: 0     benzonatate 100 MG Caps  Commonly known as: Tessalon      Take 1 capsule (100 mg total) by mouth 3 (three) times daily as needed for cough. Quantity: 30 capsule  Refills: 0     HYDROcodone-acetaminophen 5-325 MG Tabs  Commonly known as: Norco      Take 1-2 tablets by mouth every 8 (eight) hours as needed for Pain. Quantity: 10 tablet  Refills: 0     sodium chloride (hypertonic) 3 % Nebu      Take 3 mL by nebulization every 8 (eight) hours. Quantity: 360 mL  Refills: 1     sucralfate 1 GM/10ML Susp  Commonly known as: Carafate      Take 10 mL (1 g total) by mouth 4 (four) times daily before meals and nightly (2200) for 20 days. Stop taking on: January 8, 2024  Quantity: 800 mL  Refills: 0            CHANGE how you take these medications        Instructions Prescription details   ipratropium-albuterol 0.5-2.5 (3) MG/3ML Soln  Commonly known as: Duoneb  What changed:   when to take this  reasons to take this      Take 3 mL by nebulization every 8 (eight) hours. Quantity: 168 mL  Refills: 1            CONTINUE taking these medications        Instructions Prescription details   Creon 21263-36203 units Cpep  Generic drug: Pancrelipase (Lip-Prot-Amyl)      Take 2 capsules with meals and 1 capsule with snacks   Quantity: 240 capsule  Refills: 0     metoprolol succinate ER 50 MG Tb24  Commonly known as: Toprol XL      TAKE 1 TABLET BY MOUTH EVERY DAY   Quantity: 90 tablet  Refills: 1     Omeprazole 40 MG Cpdr      Take 1 capsule (40 mg total) by mouth 2 (two) times daily before meals.    Quantity: 90 capsule  Refills: 3     sertraline 100 MG Tabs  Commonly known as: Zoloft      Take 1 tablet (100 mg total) by mouth daily. Quantity: 90 tablet  Refills: 1            STOP taking these medications      albuterol (2.5 MG/3ML) 0.083% Nebu  Commonly known as: Ventolin                  Where to Get Your Medications        These medications were sent to [facility] [address] Telma Roldan [phone], [address] [place], [facility]      Phone: [phone]   amoxicillin clavulanate 875-125 MG Tabs  benzonatate 100 MG Caps  HYDROcodone-acetaminophen 5-325 MG Tabs  ipratropium-albuterol 0.5-2.5 (3) MG/3ML Soln  sodium chloride (hypertonic) 3 % Nebu       Please  your prescriptions at the location directed by your doctor or nurse    Bring a paper prescription for each of these medications  sucralfate 1 GM/10ML Susp         [place]    Follow-up appointment:   Agus Connolly MD  . [address] [facility] [phone]    Follow up in 1 month(s)      Valerie Gallegos MD  [address] [facility] [address]  [phone]    Follow up in 1 month(s)      Appointments for Next 30 Days 12/21/2023 - 1/20/2024        Date Arrival Time Visit Type Length Department Provider     12/22/2023 11:20 AM  [address] [place] [address] 40 min [address] [facility],[address], [facility] [address], Darwin Sanderson MD    Patient Instructions:         Location Instructions:     Masks are optional for all patients and visitors, unless otherwise indicated. 12/26/2023  8:00 AM  CHEMOTHERAPY [2076] 60 min [facility] in [address] [place] [address]    Patient Instructions:         Location Instructions: Your appointment is scheduled at the [facility] in [address] [place] [address]. The address is Malou Busby [address] [place] [address].  Please park in the [address], [address] and enter through the [address] [place] [address].. Once you arrive, please register at the Cancer [address] on the second floor. Masks are optional for all patients and visitors, unless otherwise indicated. No care partners/visitors under 25years of age are allowed in the infusion room. 12/26/2023  8:30 AM  ON TREATMENT VISIT FOLLOW-UP [2641] 15 min [facility] in [address] [place], [place]    Patient Instructions:         Location Instructions:     **IF YOU NEED LABWORK OR AN INFUSION ALONG WITH YOUR APPOINTMENT, YOU MUST CALL TO SCHEDULE.**  Your appointment is scheduled at the [facility] in [place]. The address is Malou Busby [address] [place] [address]. Please park in the [address], [address] and enter through the [address] [facility]. Once you arrive, please register at the [address] [facility] on the second floor. Masks are optional for all patients and visitors, unless otherwise indicated. 12/28/2023  9:30 AM  EE PET WHL BDY TO THIGH DOSE [4590] 15 min [facility] PET [facility] PET DOSE RM1    Patient Instructions:     Please arrive 15 minutes early for this appointment. BEFORE YOUR EXAM  -No strenuous activity for 48 hours before your exam.  -After 5:00 PM the night before your test, avoid carbohydrates (no candy, gum, mints, ice cream, cake, sweets, or soda pop). -Do not eat any food for 6 hours before your appointment time; please only drink plain water. DIABETIC PATIENTS ONLY  -If you are taking Metformin, please withhold for at least 48 hours before your exam.  -Do not take any insulin within 4 hours of your appointment. If you are taking long-acting insulin, take only half the normal dose at your normal time.   -Please avoid carbohydrates the entire day before the scan.  -Do not take oral diabetes medication 4 hours prior to your test.     ON THE DAY OF YOUR EXAM  -Dress warmly and comfortably.   No metal in your clothes, such as snaps or zippers, and please leave valuables/jewelry at home.  -For this test, you will receive an injection, rest for 30-90 minutes and take a 15-50 minute scan. -There are no side effects to the injection. Location Instructions: Your appointment will be at [facility] located at [address] [facility]. [facility], [address] [place] [address], [facility]. You may park in the [address] [place] [address]. &nbsp; Enter the [facility] entrance and check in at the [address] The phone number for this location is [phone]. Please bring your insurance card and photo ID. You will also need to bring your doctor's order unless your physician's office submitted the order electronically or faxed the order. Without the order, your test may be delayed or postponed. Children: Children under the age of 15 must have another adult caregiver with them.&nbsp; Please do not bring your child/children without a caregiver. &nbsp; Because of the highly sensitive equipment and privacy of all our patients, children will not be permitted in the exam rooms, unless otherwise noted and in accordance with departmental policy. PATIENT RESPONSIBILITY ESTIMATE  - (Estimate) We will provide you with your estimated remaining deductible and coinsurance balance for your services at the time of check in.  - (Payment) Please be aware that you may be asked for payment at the time of service.  - (Questions) If you would like more information about your Patient Responsibility Estimate in advance of your appointment, you can speak to a  ([phone], option 5). Masks are optional for all patients and visitors, unless otherwise indicated. 12/28/2023 10:45 AM  [facility] PET L BDY TO THIGH SCAN [address] [facility] 45 min [facility] PET [facility] PET SCANNER    Patient Instructions:     Please arrive 15 minutes early for this appointment.      BEFORE YOUR EXAM  -No strenuous activity for 48 hours before your exam.  -After 5:00 PM the night before your test, avoid carbohydrates (no candy, gum, mints, ice cream, cake, sweets, or soda pop). -Do not eat any food for 6 hours before your appointment time; please only drink plain water. DIABETIC PATIENTS ONLY  -If you are taking Metformin, please withhold for at least 48 hours before your exam.  -Do not take any insulin within 4 hours of your appointment. If you are taking long-acting insulin, take only half the normal dose at your normal time.   -Please avoid carbohydrates the entire day before the scan.  -Do not take oral diabetes medication 4 hours prior to your test.     ON THE DAY OF YOUR EXAM  -Dress warmly and comfortably. No metal in your clothes, such as snaps or zippers, and please leave valuables/jewelry at home.  -For this test, you will receive an injection, rest for 30-90 minutes and take a 15-50 minute scan. -There are no side effects to the injection. Location Instructions: Your appointment will be at [facility] located at [address] [facility]. [address], [facility], [facility]. You may park in the [address] [place], [place] [address]way. &nbsp; Enter the [facility] entrance and check in at the [address]. The phone number for this location is [phone]. Please bring your insurance card and photo ID. You will also need to bring your doctor's order unless your physician's office submitted the order electronically or faxed the order. Without the order, your test may be delayed or postponed. Children: Children under the age of 15 must have another adult caregiver with them.&nbsp; Please do not bring your child/children without a caregiver. &nbsp; Because of the highly sensitive equipment and privacy of all our patients, children will not be permitted in the exam rooms, unless otherwise noted and in accordance with departmental policy.   PATIENT RESPONSIBILITY ESTIMATE  - (Estimate) We will provide you with your estimated remaining deductible and coinsurance balance for your services at the time of check in.  - (Payment) Please be aware that you may be asked for payment at the time of service.  - (Questions) If you would like more information about your Patient Responsibility Estimate in advance of your appointment, you can speak to a  ([phone], option 5). Masks are optional for all patients and visitors, unless otherwise indicated. 1/3/2024  3:30 PM  FOLLOW UP-HEM/ONC [8591] [facility] [address]    Patient Instructions:         Location Instructions:     **IF YOU NEED LABWORK OR AN INFUSION ALONG WITH YOUR APPOINTMENT, YOU MUST CALL TO SCHEDULE.**  Your appointment is on the [address] [place] [address] in the [facility]. The address is [address] [place] [address]. Please register at the [address] [place] [address] on the second floor. Masks are optional for all patients and visitors, unless otherwise indicated. -----------------------------------------------------------------------------------------------  PATIENT DISCHARGE INSTRUCTIONS: See electronic chart    Pura Stuart MD      The 21st Century Cures Act makes medical notes like these available to patients in the interest of transparency. Please be advised this is a medical document. Medical documents are intended to carry relevant information, facts as evident, and the clinical opinion of the practitioner. The medical note is intended as peer to peer communication and may appear blunt or direct. It is written in medical language and may contain abbreviations or verbiage that are unfamiliar.

## 2023-12-21 NOTE — PAYOR COMM NOTE
--------------  DISCHARGE REVIEW    Payor: Richa Pace [employer] PPO  Subscriber #:  [id number]  Authorization Number: [id number]    Admit date: 12/17/23  Admit time:   2:28 PM  Discharge Date: 12/20/2023  4:23 PM     Admitting Physician: Lary Montiel DO  Attending Physician:  No att. providers found  Primary Care Physician: Katie Hardy MD       Discharge Summary Notes    No notes of this type exist for this encounter. 12/20 ID NOTE  Hosp Day # 3 PCP Kiah Silva MD      Abx: Unasyn D#2     Subjective: Patient seen and examined today. Feeling better overall. Cough is improving. Afebrile. On room air. Laboratory Data:        Lab Results   Component Value Date     CREATSERUM 0.54 12/20/2023     BUN 6 12/20/2023      12/20/2023     K 3.5 12/20/2023      12/20/2023     CO2 31.0 12/20/2023      12/20/2023     CA 8.3 12/20/2023     ALB 2.5 12/20/2023     ALKPHO 93 12/20/2023     BILT 0.3 12/20/2023     TP 6.0 12/20/2023     AST 15 12/20/2023     ALT 18 12/20/2023           ASSESSMENT:     Streptococcal bacteremia. 1/2 Bcxs + 12/16 with strep gordonii. No fevers. Suspect contaminant. Mild COVID-19 infection (12/10/23)  Nausea/Emesis. Cough- assume related to reflux with chronic aspiration. Sputum culture 12/17 with normal resp zeinab. Gastric adenocarcinoma s/p resection. Imaging without evidence of anastomotic leak. 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      PLAN:  -Continue to hold COVID-19 treatment  -Follow sputum culture from 12/18- normal resp zeinab so far  -Follow repeat blood cultures to document clearance- ngtd  -Continue IV Unasyn while inpatient  -GI and pulmonary evals appreciated  -Continue supportive measures   -If both sets of repeat blood cultures from 12/18 are negative at 48hrs today, ok to dc from ID standpoint with Augmentin x 7 days. Discussed case with RN, patient and Dr. Narciso Grewal.

## 2023-12-22 ENCOUNTER — OFFICE VISIT (OUTPATIENT)
Dept: FAMILY MEDICINE CLINIC | Facility: CLINIC | Age: 53
End: 2023-12-22
Payer: COMMERCIAL

## 2023-12-22 VITALS
HEART RATE: 83 BPM | SYSTOLIC BLOOD PRESSURE: 138 MMHG | RESPIRATION RATE: 18 BRPM | BODY MASS INDEX: 23.49 KG/M2 | OXYGEN SATURATION: 96 % | WEIGHT: 183 LBS | HEIGHT: 74 IN | DIASTOLIC BLOOD PRESSURE: 96 MMHG

## 2023-12-22 DIAGNOSIS — I10 PRIMARY HYPERTENSION: ICD-10-CM

## 2023-12-22 DIAGNOSIS — C15.9 MALIGNANT NEOPLASM OF ESOPHAGUS, UNSPECIFIED LOCATION (HCC): Primary | ICD-10-CM

## 2023-12-22 DIAGNOSIS — C15.5 MALIGNANT NEOPLASM OF LOWER THIRD OF ESOPHAGUS (HCC): ICD-10-CM

## 2023-12-22 DIAGNOSIS — Z95.1 S/P CABG X 4: ICD-10-CM

## 2023-12-22 DIAGNOSIS — E78.5 HYPERLIPIDEMIA WITH TARGET LOW DENSITY LIPOPROTEIN (LDL) CHOLESTEROL LESS THAN 70 MG/DL: ICD-10-CM

## 2023-12-22 PROBLEM — U07.1 ACUTE COVID-19: Status: RESOLVED | Noted: 2023-12-17 | Resolved: 2023-12-22

## 2023-12-22 PROBLEM — U07.1 ACUTE COVID-19: Status: RESOLVED | Noted: 2023-01-01 | Resolved: 2023-01-01

## 2023-12-22 PROCEDURE — 3075F SYST BP GE 130 - 139MM HG: CPT | Performed by: FAMILY MEDICINE

## 2023-12-22 PROCEDURE — 99496 TRANSJ CARE MGMT HIGH F2F 7D: CPT | Performed by: FAMILY MEDICINE

## 2023-12-22 PROCEDURE — 3008F BODY MASS INDEX DOCD: CPT | Performed by: FAMILY MEDICINE

## 2023-12-22 PROCEDURE — 3080F DIAST BP >= 90 MM HG: CPT | Performed by: FAMILY MEDICINE

## 2023-12-22 RX ORDER — SUCRALFATE 1 G/1
1 TABLET ORAL 3 TIMES DAILY PRN
Qty: 60 TABLET | Refills: 3 | Status: ON HOLD | OUTPATIENT
Start: 2023-12-22 | End: 2024-01-21

## 2023-12-22 RX ORDER — HYDROCODONE BITARTRATE AND ACETAMINOPHEN 5; 325 MG/1; MG/1
1-2 TABLET ORAL EVERY 8 HOURS PRN
Qty: 20 TABLET | Refills: 0 | Status: SHIPPED | OUTPATIENT
Start: 2023-12-22 | End: 2023-12-28

## 2023-12-22 RX ORDER — LOSARTAN POTASSIUM 50 MG/1
50 TABLET ORAL DAILY
Qty: 90 TABLET | Refills: 2 | Status: ON HOLD | OUTPATIENT
Start: 2023-12-22

## 2023-12-22 NOTE — PROGRESS NOTES
HPI:    Nancy Verde. is a 46year old male her today for hospital follow up. Discharge Summary was obtained and reviewed. Issues and concerns since discharge:  BP was high, with stable respiratory status. Health Status:  Improved    Follow up of pending results and treatments from hospital:      Functional Status:  Patient able to care for self at home. Pain Control:  N/A    Fall/Risk Assessment        Low risk for falls. Allergies:  No Known Allergies   Current Meds:  Current Outpatient Medications   Medication Sig Dispense Refill    benzonatate 100 MG Oral Cap Take 1 capsule (100 mg total) by mouth 3 (three) times daily as needed for cough. 30 capsule 0    HYDROcodone-acetaminophen 5-325 MG Oral Tab Take 1-2 tablets by mouth every 8 (eight) hours as needed for Pain. 10 tablet 0    amoxicillin clavulanate 875-125 MG Oral Tab Take 1 tablet by mouth 2 (two) times daily for 7 days. 14 tablet 0    ipratropium-albuterol 0.5-2.5 (3) MG/3ML Inhalation Solution Take 3 mL by nebulization every 8 (eight) hours. 168 mL 1    sodium chloride, hypertonic, 3 % Inhalation Nebu Soln Take 3 mL by nebulization every 8 (eight) hours. 360 mL 1    sucralfate 1 GM/10ML Oral Suspension Take 10 mL (1 g total) by mouth 4 (four) times daily before meals and nightly (2200) for 20 days. 800 mL 0    Omeprazole 40 MG Oral Capsule Delayed Release Take 1 capsule (40 mg total) by mouth 2 (two) times daily before meals. 90 capsule 3    sertraline 100 MG Oral Tab Take 1 tablet (100 mg total) by mouth daily.  90 tablet 1    Pancrelipase, Lip-Prot-Amyl, (CREON) 74786-36786 units Oral Cap DR Particles Take 2 capsules with meals and 1 capsule with snacks 240 capsule 0    metoprolol succinate ER 50 MG Oral Tablet 24 Hr TAKE 1 TABLET BY MOUTH EVERY DAY 90 tablet 1        HISTORY:  Past Medical History:   Diagnosis Date    Belching 02/01/2022    Black stools 02/01/2022 Borderline diabetes     Dx in 8/2013 - HgA1C 6.2%    C. difficile diarrhea 10/23/2022    pt treated and without symptoms    Chest pain 02/01/2022    Coronary artery disease     On 8/16/13: CABG x 4 with LIMA to LAD and SVG to diagonal, OM and PDA    Decorative tattoo 01/01/2000    Depression     Esophageal cancer ([address]     completed chemo    Essential hypertension     Exposure to medical diagnostic radiation     last tx 8/18/2022    Frequent urination 01/01/2013    Gastroparesis     Heartburn 02/01/2022    High blood pressure     High cholesterol 08/01/2013    Found when I had quadruple bypass    History of COVID-19 10/16/2022    asymptomatic - pt was dx during a hospitalization for another diagnosis. No continued symptoms    History of stomach ulcers     Hyperlipidemia     Hyperlipidemia LDL goal < 70     Indigestion 02/01/2022    Morbid obesity with BMI of 40.0-44.9, adult ([address]     Nontoxic multinodular goiter     Dx in 8/2013: pt was told that imaging showed thyroid cysts per PCP    Pancreatic cancer ([address]     last dose 12/4/2023 is scheduled for another round 12/27/23    Peripheral vascular disease ([address]     pt denies    Personal history of antineoplastic chemotherapy     for esophageal cancer/completed    Personal history of antineoplastic chemotherapy     pancreatic cancer    Problems with swallowing 02/01/2022    Pulmonary nodules     Dx in 8/2013: CT chest showed small bilateral fissural-based lung nodules less than 1 cm    S/P CABG x 4     On 8/16/13: CABG x 4 with LIMA to LAD and SVG to diagonal, OM and PDA    Shortness of breath     Vomiting 02/01/2022      Past Surgical History:   Procedure Laterality Date    APPENDECTOMY      APPENDECTOMY      ARTHROSCOPY OF JOINT UNLISTED      right shoulder    CABG      On 8/16/13: CABG x 4 with LIMA to LAD and SVG to diagonal, OM and PDA    CARDIAC CATH LAB      On 8/14/2013: cardiac cath showed 3-vessel disease    OTHER SURGICAL HISTORY      1.        Laparoscopic robotic-assisted esophagogastrectomy.       Family History   Problem Relation Age of Onset    Cancer Mother         breast and colon     Diabetes Neg       Social History     Socioeconomic History    Marital status:      Spouse name: Not on file    Number of children: 3    Years of education: Not on file    Highest education level: Not on file   Occupational History    Occupation: works as  - on [employer] comp   Tobacco Use    Smoking status: Former     Packs/day: 1.00     Years: 30.00     Additional pack years: 0.00     Total pack years: 30.00     Types: Cigarettes     Quit date: 2013     Years since quitting: 10.9    Smokeless tobacco: Never    Tobacco comments:     Quit smoking 2013   Vaping Use    Vaping Use: Never used   Substance and Sexual Activity    Alcohol use: Not Currently    Drug use: Never    Sexual activity: Not Currently     Partners: Female   Other Topics Concern     Service Not Asked    Blood Transfusions Not Asked    Caffeine Concern Yes     Comment: 2 energy drinks, 6 mountain dews daily    Occupational Exposure Not Asked    Hobby Hazards Not Asked    Sleep Concern Not Asked    Stress Concern Not Asked    Weight Concern Not Asked    Special Diet Not Asked    Back Care Not Asked    Exercise No    Bike Helmet Not Asked    Seat Belt Not Asked    Self-Exams Not Asked   Social History Narrative    Lives with life partner    Has 3 daughters - 3 lives [facility], 1 in [place], 1 in [facility] Determinants of Health     Financial Resource Strain: Low Risk  (12/14/2023)    Financial Resource Strain     Difficulty of Paying Living Expenses: Not very hard     Med Affordability: No   Food Insecurity: No Food Insecurity (12/17/2023)    Food Insecurity     Food Insecurity: Never true   Transportation Needs: No Transportation Needs (12/17/2023)    Transportation Needs     Lack of Transportation: No   Physical Activity: Not on file   Stress: Not on file   Social Connections: Not on file Housing Stability: Low Risk  (12/17/2023)    Housing Stability     Housing Instability: No     Housing Instability Emergency: Not on file        ROS:   Patient denies shortness of breath, denies chest pain and denies any recent fevers or chills. Patient reports no urinary complaints and denies headaches or visual disturbances. Patient denies any abdominal pain at this time. Patient has no new skin lesions. Patient reports no acute back pain and reports no dizziness or headaches. Patient reports no visual disturbances and reports hearing has been about the same. Patient reports no recent injury or trauma. PHYSICAL EXAM:   GENERAL: well developed, well nourished, in no apparent distress  SKIN: no rashes, no suspicious lesions  HEENT: atraumatic, normocephalic, ears and throat are clear  EYES: PERRLA, EOMI, conjunctiva are clear  NECK: supple, no adenopathy, no bruits  CHEST: no chest tenderness    LUNGS: clear to auscultation  CARDIO: RRR without murmur  GI: good BS's, no masses, HSM or tenderness  : deferred  RECTAL: deferred  MUSCULOSKELETAL: back is not tender, FROM of the back  EXTREMITIES: no cyanosis, clubbing or edema  NEURO: Oriented times three, cranial nerves are intact, motor and sensory are grossly intact      ASSESSMENT/ PLAN:   Diagnoses and all orders for this visit:    Malignant neoplasm of esophagus, unspecified location ([facility]    Primary hypertension    Hyperlipidemia with target low density lipoprotein (LDL) cholesterol less than 70 mg/dL    S/P CABG x 4    Malignant neoplasm of lower third of esophagus (HCC)    +COVID, with alpha hemolytic bacteremia-oral augment to complete. -GERD  -HTN, added losartan at 50 mg  -carafate tablets.      Date of Admission: 12/16/2023  Date of Discharge:  12/20/2023      Discharge Disposition: Home or Self Care     Discharge Diagnosis:     #Intractable nausea vomiting  #COVID 19 infection  #Alpha hemolytic strep bacteremia  #Pancreatic and esophageal cancer  # Hypertension  # Depression  #CAD s/p ABG      Patient and/or caregiver are educated on plan of care and current disease burden. Medical Needs:  Problem list has been updated to include discharge diagnosis. Hospital and outpatient medications have been reconciled. Transitional Care Management Certification:  I certify that the following are true:  Communication with the patient was made within 2 business days of discharge. Complexity of Medical Decision Making is High. Patient seen within 7 days.

## 2023-12-26 ENCOUNTER — PATIENT MESSAGE (OUTPATIENT)
Dept: HEMATOLOGY/ONCOLOGY | Age: 53
End: 2023-12-26

## 2023-12-26 ENCOUNTER — OFFICE VISIT (OUTPATIENT)
Dept: HEMATOLOGY/ONCOLOGY | Age: 53
End: 2023-12-26
Attending: INTERNAL MEDICINE
Payer: COMMERCIAL

## 2023-12-26 ENCOUNTER — HOSPITAL ENCOUNTER (OUTPATIENT)
Dept: GENERAL RADIOLOGY | Age: 53
Discharge: HOME | End: 2023-12-26
Attending: CLINICAL NURSE SPECIALIST
Payer: COMMERCIAL

## 2023-12-26 ENCOUNTER — APPOINTMENT (OUTPATIENT)
Dept: HEMATOLOGY/ONCOLOGY | Age: 53
End: 2023-12-26
Attending: INTERNAL MEDICINE
Payer: COMMERCIAL

## 2023-12-26 VITALS
SYSTOLIC BLOOD PRESSURE: 159 MMHG | OXYGEN SATURATION: 94 % | TEMPERATURE: 97 F | HEART RATE: 62 BPM | HEIGHT: 73.82 IN | BODY MASS INDEX: 23.61 KG/M2 | DIASTOLIC BLOOD PRESSURE: 89 MMHG | RESPIRATION RATE: 16 BRPM | WEIGHT: 184 LBS

## 2023-12-26 DIAGNOSIS — C15.9 MALIGNANT NEOPLASM OF ESOPHAGUS (HCC): Primary | ICD-10-CM

## 2023-12-26 DIAGNOSIS — J69.0 ASPIRATION PNEUMONIA OF BOTH LUNGS DUE TO GASTRIC SECRETIONS, UNSPECIFIED PART OF LUNG (HCC): Primary | ICD-10-CM

## 2023-12-26 DIAGNOSIS — R05.1 ACUTE COUGH: ICD-10-CM

## 2023-12-26 DIAGNOSIS — C15.5 MALIGNANT NEOPLASM OF LOWER THIRD OF ESOPHAGUS (HCC): ICD-10-CM

## 2023-12-26 DIAGNOSIS — J18.9 PNEUMONIA OF BOTH LOWER LOBES DUE TO INFECTIOUS ORGANISM: ICD-10-CM

## 2023-12-26 DIAGNOSIS — D72.828 OTHER ELEVATED WHITE BLOOD CELL (WBC) COUNT: ICD-10-CM

## 2023-12-26 DIAGNOSIS — Z51.11 ENCOUNTER FOR CHEMOTHERAPY MANAGEMENT: ICD-10-CM

## 2023-12-26 DIAGNOSIS — J69.0 ASPIRATION PNEUMONIA OF BOTH LUNGS DUE TO GASTRIC SECRETIONS, UNSPECIFIED PART OF LUNG (HCC): ICD-10-CM

## 2023-12-26 DIAGNOSIS — C15.9 MALIGNANT NEOPLASM OF ESOPHAGUS, UNSPECIFIED LOCATION (HCC): ICD-10-CM

## 2023-12-26 LAB
ALBUMIN SERPL-MCNC: 2.8 G/DL (ref 3.4–5)
ALBUMIN/GLOB SERPL: 0.6 {RATIO} (ref 1–2)
ALP LIVER SERPL-CCNC: 118 U/L
ALT SERPL-CCNC: 28 U/L
ANION GAP SERPL CALC-SCNC: 5 MMOL/L (ref 0–18)
AST SERPL-CCNC: 22 U/L (ref 15–37)
BASOPHILS # BLD AUTO: 0.06 X10(3) UL (ref 0–0.2)
BASOPHILS NFR BLD AUTO: 0.4 %
BILIRUB SERPL-MCNC: 0.4 MG/DL (ref 0.1–2)
BUN BLD-MCNC: 10 MG/DL (ref 9–23)
CALCIUM BLD-MCNC: 9.2 MG/DL (ref 8.5–10.1)
CANCER AG19-9 SERPL-ACNC: 117.5 U/ML (ref ?–37)
CHLORIDE SERPL-SCNC: 105 MMOL/L (ref 98–112)
CO2 SERPL-SCNC: 27 MMOL/L (ref 21–32)
CREAT BLD-MCNC: 0.55 MG/DL
EGFRCR SERPLBLD CKD-EPI 2021: 118 ML/MIN/1.73M2 (ref 60–?)
EOSINOPHIL # BLD AUTO: 0.19 X10(3) UL (ref 0–0.7)
EOSINOPHIL NFR BLD AUTO: 1.4 %
ERYTHROCYTE [DISTWIDTH] IN BLOOD BY AUTOMATED COUNT: 13.6 %
GLOBULIN PLAS-MCNC: 4.7 G/DL (ref 2.8–4.4)
GLUCOSE BLD-MCNC: 113 MG/DL (ref 70–99)
HCT VFR BLD AUTO: 35 %
HGB BLD-MCNC: 11.1 G/DL
IMM GRANULOCYTES # BLD AUTO: 0.13 X10(3) UL (ref 0–1)
IMM GRANULOCYTES NFR BLD: 0.9 %
LYMPHOCYTES # BLD AUTO: 0.67 X10(3) UL (ref 1–4)
LYMPHOCYTES NFR BLD AUTO: 4.8 %
MCH RBC QN AUTO: 30.9 PG (ref 26–34)
MCHC RBC AUTO-ENTMCNC: 31.7 G/DL (ref 31–37)
MCV RBC AUTO: 97.5 FL
MONOCYTES # BLD AUTO: 0.59 X10(3) UL (ref 0.1–1)
MONOCYTES NFR BLD AUTO: 4.3 %
NEUTROPHILS # BLD AUTO: 12.19 X10 (3) UL (ref 1.5–7.7)
NEUTROPHILS # BLD AUTO: 12.19 X10(3) UL (ref 1.5–7.7)
NEUTROPHILS NFR BLD AUTO: 88.2 %
OSMOLALITY SERPL CALC.SUM OF ELEC: 284 MOSM/KG (ref 275–295)
PLATELET # BLD AUTO: 271 10(3)UL (ref 150–450)
POTASSIUM SERPL-SCNC: 4 MMOL/L (ref 3.5–5.1)
PROT SERPL-MCNC: 7.5 G/DL (ref 6.4–8.2)
RBC # BLD AUTO: 3.59 X10(6)UL
SODIUM SERPL-SCNC: 137 MMOL/L (ref 136–145)
WBC # BLD AUTO: 13.8 X10(3) UL (ref 4–11)

## 2023-12-26 PROCEDURE — 80053 COMPREHEN METABOLIC PANEL: CPT

## 2023-12-26 PROCEDURE — 86301 IMMUNOASSAY TUMOR CA 19-9: CPT

## 2023-12-26 PROCEDURE — 99215 OFFICE O/P EST HI 40 MIN: CPT | Performed by: CLINICAL NURSE SPECIALIST

## 2023-12-26 PROCEDURE — 96413 CHEMO IV INFUSION 1 HR: CPT

## 2023-12-26 PROCEDURE — 85025 COMPLETE CBC W/AUTO DIFF WBC: CPT

## 2023-12-26 PROCEDURE — 71046 X-RAY EXAM CHEST 2 VIEWS: CPT | Performed by: CLINICAL NURSE SPECIALIST

## 2023-12-26 RX ORDER — CODEINE PHOSPHATE AND GUAIFENESIN 10; 100 MG/5ML; MG/5ML
5 SOLUTION ORAL EVERY 6 HOURS PRN
Qty: 237 ML | Refills: 1 | Status: ON HOLD | OUTPATIENT
Start: 2023-12-26

## 2023-12-26 RX ORDER — AMOXICILLIN AND CLAVULANATE POTASSIUM 875; 125 MG/1; MG/1
1 TABLET, FILM COATED ORAL 2 TIMES DAILY
Qty: 20 TABLET | Refills: 0 | Status: ON HOLD | OUTPATIENT
Start: 2023-12-26 | End: 2024-01-05

## 2023-12-26 NOTE — PROGRESS NOTES
Pt here for C13D1 Drug(s)Trazimera. Arrives Ambulating independently, accompanied by Self     Patient was evaluated today by LD and Treatment Nurse. Oral medications included in this regimen:  no    Patient confirms comprehension of cancer treatment schedule:  yes    Pregnancy screening:  Not applicable    Modifications in dose or schedule:  No    Medications appearance and physical integrity checked by RN: yes. Chemotherapy IV pump settings verified by 2 RNs:  No due to targeted therapy IV administration. Frequency of blood return and site check throughout administration: Prior to administration and At completion of therapy     Infusion/treatment outcome:  patient tolerated treatment without incident    Education Record    Learner:  Patient  Barriers / Limitations:  None  Method:  Brief focused and Discussion  Education / instructions given:  Plan of care and next appointments reviewed. Pt aware he is to go have CXR done today at 1015. Kiley Weldon for pt to go home after CXR per JASON Alanis. Outcome:  Shows understanding    Discharged Home, Ambulating independently, accompanied by:Self in stable condition, no new complaints.     Patient/family verbalized understanding of future appointments: by [facility]

## 2023-12-27 ENCOUNTER — TELEPHONE (OUTPATIENT)
Dept: FAMILY MEDICINE CLINIC | Facility: CLINIC | Age: 53
End: 2023-12-27

## 2023-12-27 DIAGNOSIS — C15.9 MALIGNANT NEOPLASM OF ESOPHAGUS, UNSPECIFIED LOCATION (HCC): ICD-10-CM

## 2023-12-27 RX ORDER — HYDROCODONE BITARTRATE AND ACETAMINOPHEN 5; 325 MG/1; MG/1
1-2 TABLET ORAL EVERY 8 HOURS PRN
Qty: 20 TABLET | Refills: 0 | OUTPATIENT
Start: 2023-12-27

## 2023-12-27 NOTE — TELEPHONE ENCOUNTER
Pt called asking why his prescription was denied. I explained to him that his prescription was just refilled on 12/22/2023. Pt states that he is taking 2 HYDROcodone-acetaminophen 5-325 MG Oral Tab a day. Pt states he has taken 6 pills a day.   Pt would like a call back to discuss the denial.

## 2023-12-28 ENCOUNTER — PATIENT MESSAGE (OUTPATIENT)
Dept: HEMATOLOGY/ONCOLOGY | Age: 53
End: 2023-12-28

## 2023-12-28 ENCOUNTER — PATIENT MESSAGE (OUTPATIENT)
Dept: FAMILY MEDICINE CLINIC | Facility: CLINIC | Age: 53
End: 2023-12-28

## 2023-12-28 RX ORDER — HYDROCODONE BITARTRATE AND ACETAMINOPHEN 5; 325 MG/1; MG/1
1-2 TABLET ORAL EVERY 8 HOURS PRN
Qty: 10 TABLET | Refills: 0 | Status: ON HOLD | OUTPATIENT
Start: 2023-12-28

## 2023-12-28 RX ORDER — PREDNISONE 20 MG/1
TABLET ORAL
Qty: 30 TABLET | Refills: 0 | Status: ON HOLD | OUTPATIENT
Start: 2023-12-28 | End: 2024-01-12

## 2023-12-28 NOTE — TELEPHONE ENCOUNTER
From: Rebecca Snyder.   To: Anselmo Zarate  Sent: 12/28/2023 8:05 AM CST  Subject: Luis Antonio Haley     Let me know why they refused the medication I'm really struggling hard time Breathe a lot pain on the right side A lot of weason

## 2023-12-28 NOTE — TELEPHONE ENCOUNTER
Patient called again regarding     HYDROcodone-acetaminophen 5-325 MG Oral Tab     He states dr. Shagufta Freeman did not refill this for him and is asking for another refill as he is out of medication. Please Advise. Thank you.

## 2023-12-28 NOTE — TELEPHONE ENCOUNTER
Pt is asking for a refill of the hydrocodone you prescribed on 12/22. He did see hem/onc on 12/26 and they prescribed guaifensin-codeine. Please advise on hydrocodone refill. Thanks.

## 2023-12-29 ENCOUNTER — OFFICE VISIT (OUTPATIENT)
Facility: CLINIC | Age: 53
End: 2023-12-29
Payer: COMMERCIAL

## 2023-12-29 ENCOUNTER — HOSPITAL ENCOUNTER (INPATIENT)
Facility: HOSPITAL | Age: 53
LOS: 5 days | Discharge: HOME | End: 2024-01-03
Attending: EMERGENCY MEDICINE | Admitting: STUDENT IN AN ORGANIZED HEALTH CARE EDUCATION/TRAINING PROGRAM
Payer: COMMERCIAL

## 2023-12-29 ENCOUNTER — TELEPHONE (OUTPATIENT)
Dept: HEMATOLOGY/ONCOLOGY | Facility: HOSPITAL | Age: 53
End: 2023-12-29

## 2023-12-29 ENCOUNTER — HOSPITAL ENCOUNTER (EMERGENCY)
Facility: HOSPITAL | Age: 53
Discharge: LEFT BEFORE DISCHARGE | End: 2023-12-29
Payer: COMMERCIAL

## 2023-12-29 ENCOUNTER — APPOINTMENT (OUTPATIENT)
Dept: GENERAL RADIOLOGY | Age: 53
End: 2023-12-29
Attending: EMERGENCY MEDICINE
Payer: COMMERCIAL

## 2023-12-29 VITALS
SYSTOLIC BLOOD PRESSURE: 132 MMHG | DIASTOLIC BLOOD PRESSURE: 68 MMHG | WEIGHT: 184 LBS | HEIGHT: 74 IN | OXYGEN SATURATION: 94 % | RESPIRATION RATE: 18 BRPM | TEMPERATURE: 98 F | BODY MASS INDEX: 23.61 KG/M2 | HEART RATE: 99 BPM

## 2023-12-29 DIAGNOSIS — R19.7 DIARRHEA, UNSPECIFIED TYPE: ICD-10-CM

## 2023-12-29 DIAGNOSIS — J18.9 HCAP (HEALTHCARE-ASSOCIATED PNEUMONIA): Primary | ICD-10-CM

## 2023-12-29 DIAGNOSIS — R06.09 DOE (DYSPNEA ON EXERTION): Primary | ICD-10-CM

## 2023-12-29 DIAGNOSIS — R05.2 SUBACUTE COUGH: ICD-10-CM

## 2023-12-29 LAB
ALBUMIN SERPL-MCNC: 2.8 G/DL (ref 3.4–5)
ALBUMIN/GLOB SERPL: 0.6 {RATIO} (ref 1–2)
ALP LIVER SERPL-CCNC: 105 U/L
ALT SERPL-CCNC: 26 U/L
ANION GAP SERPL CALC-SCNC: 4 MMOL/L (ref 0–18)
AST SERPL-CCNC: 19 U/L (ref 15–37)
BASOPHILS # BLD AUTO: 0.05 X10(3) UL (ref 0–0.2)
BASOPHILS NFR BLD AUTO: 0.5 %
BILIRUB SERPL-MCNC: 0.3 MG/DL (ref 0.1–2)
BUN BLD-MCNC: 13 MG/DL (ref 9–23)
CALCIUM BLD-MCNC: 8.7 MG/DL (ref 8.5–10.1)
CHLORIDE SERPL-SCNC: 107 MMOL/L (ref 98–112)
CO2 SERPL-SCNC: 28 MMOL/L (ref 21–32)
CREAT BLD-MCNC: 0.59 MG/DL
EGFRCR SERPLBLD CKD-EPI 2021: 115 ML/MIN/1.73M2 (ref 60–?)
EOSINOPHIL # BLD AUTO: 0.23 X10(3) UL (ref 0–0.7)
EOSINOPHIL NFR BLD AUTO: 2.3 %
ERYTHROCYTE [DISTWIDTH] IN BLOOD BY AUTOMATED COUNT: 13.7 %
GLOBULIN PLAS-MCNC: 4.6 G/DL (ref 2.8–4.4)
GLUCOSE BLD-MCNC: 112 MG/DL (ref 70–99)
HCT VFR BLD AUTO: 33.8 %
HGB BLD-MCNC: 10.9 G/DL
IMM GRANULOCYTES # BLD AUTO: 0.12 X10(3) UL (ref 0–1)
IMM GRANULOCYTES NFR BLD: 1.2 %
LACTATE SERPL-SCNC: 0.5 MMOL/L (ref 0.4–2)
LIPASE SERPL-CCNC: 10 U/L (ref ?–300)
LYMPHOCYTES # BLD AUTO: 0.6 X10(3) UL (ref 1–4)
LYMPHOCYTES NFR BLD AUTO: 6 %
MCH RBC QN AUTO: 31.2 PG (ref 26–34)
MCHC RBC AUTO-ENTMCNC: 32.2 G/DL (ref 31–37)
MCV RBC AUTO: 96.8 FL
MONOCYTES # BLD AUTO: 0.6 X10(3) UL (ref 0.1–1)
MONOCYTES NFR BLD AUTO: 6 %
NEUTROPHILS # BLD AUTO: 8.34 X10 (3) UL (ref 1.5–7.7)
NEUTROPHILS # BLD AUTO: 8.34 X10(3) UL (ref 1.5–7.7)
NEUTROPHILS NFR BLD AUTO: 84 %
OSMOLALITY SERPL CALC.SUM OF ELEC: 289 MOSM/KG (ref 275–295)
PLATELET # BLD AUTO: 278 10(3)UL (ref 150–450)
POTASSIUM SERPL-SCNC: 3.6 MMOL/L (ref 3.5–5.1)
PROT SERPL-MCNC: 7.4 G/DL (ref 6.4–8.2)
RBC # BLD AUTO: 3.49 X10(6)UL
SODIUM SERPL-SCNC: 139 MMOL/L (ref 136–145)
WBC # BLD AUTO: 9.9 X10(3) UL (ref 4–11)

## 2023-12-29 PROCEDURE — 99214 OFFICE O/P EST MOD 30 MIN: CPT | Performed by: INTERNAL MEDICINE

## 2023-12-29 PROCEDURE — 71045 X-RAY EXAM CHEST 1 VIEW: CPT | Performed by: EMERGENCY MEDICINE

## 2023-12-29 PROCEDURE — 99223 1ST HOSP IP/OBS HIGH 75: CPT | Performed by: STUDENT IN AN ORGANIZED HEALTH CARE EDUCATION/TRAINING PROGRAM

## 2023-12-29 PROCEDURE — 3008F BODY MASS INDEX DOCD: CPT | Performed by: INTERNAL MEDICINE

## 2023-12-29 PROCEDURE — 3078F DIAST BP <80 MM HG: CPT | Performed by: INTERNAL MEDICINE

## 2023-12-29 PROCEDURE — 3075F SYST BP GE 130 - 139MM HG: CPT | Performed by: INTERNAL MEDICINE

## 2023-12-29 RX ORDER — FLUTICASONE FUROATE AND VILANTEROL 100; 25 UG/1; UG/1
1 POWDER RESPIRATORY (INHALATION) DAILY
Status: DISCONTINUED | OUTPATIENT
Start: 2023-12-29 | End: 2024-01-03

## 2023-12-29 RX ORDER — BENZONATATE 200 MG/1
200 CAPSULE ORAL 3 TIMES DAILY PRN
Status: DISCONTINUED | OUTPATIENT
Start: 2023-12-29 | End: 2024-01-03

## 2023-12-29 RX ORDER — IPRATROPIUM BROMIDE AND ALBUTEROL SULFATE 2.5; .5 MG/3ML; MG/3ML
3 SOLUTION RESPIRATORY (INHALATION) ONCE
Status: COMPLETED | OUTPATIENT
Start: 2023-12-29 | End: 2023-12-29

## 2023-12-29 RX ORDER — ECHINACEA PURPUREA EXTRACT 125 MG
1 TABLET ORAL
Status: DISCONTINUED | OUTPATIENT
Start: 2023-12-29 | End: 2024-01-03

## 2023-12-29 RX ORDER — CETIRIZINE HYDROCHLORIDE 5 MG/1
5 TABLET ORAL DAILY
COMMUNITY

## 2023-12-29 RX ORDER — SERTRALINE HYDROCHLORIDE 100 MG/1
100 TABLET, FILM COATED ORAL DAILY
Status: DISCONTINUED | OUTPATIENT
Start: 2023-12-29 | End: 2024-01-03

## 2023-12-29 RX ORDER — ACETAMINOPHEN 500 MG
500 TABLET ORAL EVERY 4 HOURS PRN
Status: DISCONTINUED | OUTPATIENT
Start: 2023-12-29 | End: 2024-01-03

## 2023-12-29 RX ORDER — MELATONIN
3 NIGHTLY PRN
Status: DISCONTINUED | OUTPATIENT
Start: 2023-12-29 | End: 2024-01-03

## 2023-12-29 RX ORDER — BACLOFEN 10 MG/1
10 TABLET ORAL 3 TIMES DAILY PRN
Status: ON HOLD | COMMUNITY
Start: 2023-12-25

## 2023-12-29 RX ORDER — BISACODYL 10 MG
10 SUPPOSITORY, RECTAL RECTAL
Status: DISCONTINUED | OUTPATIENT
Start: 2023-12-29 | End: 2024-01-03

## 2023-12-29 RX ORDER — ENOXAPARIN SODIUM 100 MG/ML
40 INJECTION SUBCUTANEOUS DAILY
Status: DISCONTINUED | OUTPATIENT
Start: 2023-12-30 | End: 2024-01-03

## 2023-12-29 RX ORDER — METOPROLOL SUCCINATE 50 MG/1
50 TABLET, EXTENDED RELEASE ORAL
Status: DISCONTINUED | OUTPATIENT
Start: 2023-12-30 | End: 2024-01-03

## 2023-12-29 RX ORDER — KETOROLAC TROMETHAMINE 15 MG/ML
15 INJECTION, SOLUTION INTRAMUSCULAR; INTRAVENOUS ONCE
Status: COMPLETED | OUTPATIENT
Start: 2023-12-29 | End: 2023-12-29

## 2023-12-29 RX ORDER — CODEINE PHOSPHATE AND GUAIFENESIN 10; 100 MG/5ML; MG/5ML
5 SOLUTION ORAL EVERY 4 HOURS PRN
Status: DISCONTINUED | OUTPATIENT
Start: 2023-12-29 | End: 2024-01-03

## 2023-12-29 RX ORDER — ONDANSETRON 2 MG/ML
4 INJECTION INTRAMUSCULAR; INTRAVENOUS EVERY 6 HOURS PRN
Status: DISCONTINUED | OUTPATIENT
Start: 2023-12-29 | End: 2024-01-03

## 2023-12-29 RX ORDER — ONDANSETRON 2 MG/ML
4 INJECTION INTRAMUSCULAR; INTRAVENOUS EVERY 4 HOURS PRN
Status: ACTIVE | OUTPATIENT
Start: 2023-12-29 | End: 2023-12-30

## 2023-12-29 RX ORDER — CETIRIZINE HYDROCHLORIDE 5 MG/1
5 TABLET ORAL DAILY
Status: DISCONTINUED | OUTPATIENT
Start: 2023-12-29 | End: 2024-01-03

## 2023-12-29 RX ORDER — DIPHENHYDRAMINE HYDROCHLORIDE 50 MG/ML
25 INJECTION INTRAMUSCULAR; INTRAVENOUS EVERY 8 HOURS PRN
Status: DISCONTINUED | OUTPATIENT
Start: 2023-12-29 | End: 2024-01-03

## 2023-12-29 RX ORDER — SODIUM CHLORIDE 9 MG/ML
INJECTION, SOLUTION INTRAVENOUS CONTINUOUS
Status: DISCONTINUED | OUTPATIENT
Start: 2023-12-29 | End: 2024-01-03

## 2023-12-29 RX ORDER — SUCRALFATE 1 G/1
1 TABLET ORAL 3 TIMES DAILY PRN
Status: DISCONTINUED | OUTPATIENT
Start: 2023-12-29 | End: 2024-01-03

## 2023-12-29 RX ORDER — ENEMA 19; 7 G/133ML; G/133ML
1 ENEMA RECTAL ONCE AS NEEDED
Status: DISCONTINUED | OUTPATIENT
Start: 2023-12-29 | End: 2024-01-03

## 2023-12-29 RX ORDER — MORPHINE SULFATE 2 MG/ML
2 INJECTION, SOLUTION INTRAMUSCULAR; INTRAVENOUS EVERY 4 HOURS PRN
Status: DISCONTINUED | OUTPATIENT
Start: 2023-12-29 | End: 2023-12-30

## 2023-12-29 RX ORDER — MORPHINE SULFATE 2 MG/ML
0.5 INJECTION, SOLUTION INTRAMUSCULAR; INTRAVENOUS EVERY 4 HOURS PRN
Status: DISCONTINUED | OUTPATIENT
Start: 2023-12-29 | End: 2023-12-30

## 2023-12-29 RX ORDER — LOSARTAN POTASSIUM 50 MG/1
50 TABLET ORAL DAILY
Status: DISCONTINUED | OUTPATIENT
Start: 2023-12-29 | End: 2024-01-03

## 2023-12-29 RX ORDER — HYDRALAZINE HYDROCHLORIDE 20 MG/ML
5 INJECTION INTRAMUSCULAR; INTRAVENOUS EVERY 6 HOURS PRN
Status: DISCONTINUED | OUTPATIENT
Start: 2023-12-29 | End: 2024-01-03

## 2023-12-29 RX ORDER — SODIUM CHLORIDE FOR INHALATION 3 %
3 VIAL, NEBULIZER (ML) INHALATION EVERY 8 HOURS
Status: DISCONTINUED | OUTPATIENT
Start: 2023-12-30 | End: 2024-01-01

## 2023-12-29 RX ORDER — FLUTICASONE PROPIONATE 50 MCG
1 SPRAY, SUSPENSION (ML) NASAL DAILY
Status: DISCONTINUED | OUTPATIENT
Start: 2023-12-29 | End: 2024-01-03

## 2023-12-29 RX ORDER — IPRATROPIUM BROMIDE AND ALBUTEROL SULFATE 2.5; .5 MG/3ML; MG/3ML
3 SOLUTION RESPIRATORY (INHALATION) EVERY 8 HOURS
Status: DISCONTINUED | OUTPATIENT
Start: 2023-12-30 | End: 2023-12-30

## 2023-12-29 RX ORDER — BACLOFEN 10 MG/1
10 TABLET ORAL 3 TIMES DAILY PRN
Status: DISCONTINUED | OUTPATIENT
Start: 2023-12-29 | End: 2024-01-03

## 2023-12-29 RX ORDER — SENNOSIDES 8.6 MG
17.2 TABLET ORAL NIGHTLY PRN
Status: DISCONTINUED | OUTPATIENT
Start: 2023-12-29 | End: 2024-01-03

## 2023-12-29 RX ORDER — MORPHINE SULFATE 4 MG/ML
4 INJECTION, SOLUTION INTRAMUSCULAR; INTRAVENOUS ONCE
Status: COMPLETED | OUTPATIENT
Start: 2023-12-29 | End: 2023-12-29

## 2023-12-29 RX ORDER — POLYETHYLENE GLYCOL 3350 17 G/17G
17 POWDER, FOR SOLUTION ORAL DAILY PRN
Status: DISCONTINUED | OUTPATIENT
Start: 2023-12-29 | End: 2024-01-03

## 2023-12-29 RX ORDER — PROCHLORPERAZINE EDISYLATE 5 MG/ML
5 INJECTION INTRAMUSCULAR; INTRAVENOUS EVERY 8 HOURS PRN
Status: DISCONTINUED | OUTPATIENT
Start: 2023-12-29 | End: 2024-01-03

## 2023-12-29 RX ORDER — MORPHINE SULFATE 2 MG/ML
1 INJECTION, SOLUTION INTRAMUSCULAR; INTRAVENOUS EVERY 4 HOURS PRN
Status: DISCONTINUED | OUTPATIENT
Start: 2023-12-29 | End: 2023-12-30

## 2023-12-29 NOTE — TELEPHONE ENCOUNTER
Spoke with patient. Having low oxygen levels, dyspnea, and uncontrolled diarrhea. Spoke with APN, patient directed to ER. Patient states he would prefer to wait to hear from pulmonologist for their recommendations. Stressed to patient that based of oxygen readings and symptoms, he should still go to ER now. He verbalized understanding and will head there now.

## 2023-12-29 NOTE — TELEPHONE ENCOUNTER
Patient arrived to clinic upset with ER wait times. Asking for vitals to be assessed and see if we are able to expedite ER visit. SpO2 94%. Instructed patient to still go to ER for further evaluation of symptoms. Patient states he will go to [facility] ER due to a lesser wait time. MD updated.

## 2023-12-29 NOTE — PATIENT INSTRUCTIONS
Plan:      Advised patient to go to the emergency department for work up of diarrhea and shortness of breath on exertion  Breo Ellipta 100-25 mcg one puff daily     Follow up: 2 months     Charo García MD

## 2023-12-29 NOTE — ED PROVIDER NOTES
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  Patient has had ongoing vomiting and a cough since his diagnosis.  He had an x-ray on the 26th that was performed that showed a left upper lobe pneumonia.  He does not know exactly why the x-ray was performed and cannot tell me what antibiotic he is on presently.  Overnight, he began having watery diarrhea.  He also noted some shortness of breath with coughing fits.  He said he would desaturate when he would cough although when he is not coughing, his oxygen sats have remained normal.  He reports a chronic right side pain that is been ongoing since the diagnosis.  He denies any acute chest pain or fevers.  He does note some chills.  He was seen at his oncologist office and advised to come in for evaluation.    Objective:   Past Medical History:   Diagnosis Date    Back problem     Belching 02/01/2022    Black stools 02/01/2022    Borderline diabetes     Dx in 8/2013 - HgA1C 6.2%    C. difficile diarrhea 10/23/2022    pt treated and without symptoms    Chest pain 02/01/2022    Coronary artery disease     On 8/16/13: CABG x 4 with LIMA to LAD and SVG to diagonal, OM and PDA    Decorative tattoo 01/01/2000    Depression     Esophageal cancer (HCC)     completed chemo    Esophageal reflux     Essential hypertension     Exposure to medical diagnostic radiation     last tx 8/18/2022    Frequent urination 01/01/2013    Gastroparesis     Heartburn  02/01/2022    High blood pressure     High cholesterol 08/01/2013    Found when I had quadruple bypass    History of COVID-19 10/16/2022    asymptomatic - pt was dx during a hospitalization for another diagnosis. No continued symptoms    History of stomach ulcers     Hyperlipidemia     Hyperlipidemia LDL goal < 70     Indigestion 02/01/2022    Morbid obesity with BMI of 40.0-44.9, adult (HCC)     Muscle weakness     Nontoxic multinodular goiter     Dx in 8/2013: pt was told that imaging showed thyroid cysts per PCP    Pancreatic cancer (HCC)     last dose 12/4/2023 is scheduled for another round 12/27/23    Peripheral vascular disease (HCC)     pt denies    Personal history of antineoplastic chemotherapy     for esophageal cancer/completed    Personal history of antineoplastic chemotherapy     pancreatic cancer    Problems with swallowing 02/01/2022    Pulmonary nodules     Dx in 8/2013: CT chest showed small bilateral fissural-based lung nodules less than 1 cm    S/P CABG x 4     On 8/16/13: CABG x 4 with LIMA to LAD and SVG to diagonal, OM and PDA    Shortness of breath     Vomiting 02/01/2022              Past Surgical History:   Procedure Laterality Date    APPENDECTOMY      APPENDECTOMY      ARTHROSCOPY OF JOINT UNLISTED      right shoulder    CABG      On 8/16/13: CABG x 4 with LIMA to LAD and SVG to diagonal, OM and PDA    CARDIAC CATH LAB      On 8/14/2013: cardiac cath showed 3-vessel disease    OTHER SURGICAL HISTORY      1.       Laparoscopic robotic-assisted esophagogastrectomy.                Social History     Socioeconomic History    Marital status:     Number of children: 3   Occupational History    Occupation: works as  - on workman's comp   Tobacco Use    Smoking status: Former     Packs/day: 0.00     Years: 27.00     Additional pack years: 0.00     Total pack years: 0.00     Types: Cigarettes     Quit date: 8/15/2013     Years since quitting: 10.3    Smokeless tobacco: Never     Tobacco comments:     Quit smoking 2013   Vaping Use    Vaping Use: Never used   Substance and Sexual Activity    Alcohol use: Not Currently    Drug use: Never    Sexual activity: Not Currently     Partners: Female   Other Topics Concern    Caffeine Concern Yes     Comment: 2 energy drinks, 6 mountain dews daily    Exercise No   Social History Narrative    Lives with life partner    Has 3 daughters - 1 lives closeby, 1 in [place], 1 in [place]     Social Determinants of Health     Financial Resource Strain: Low Risk  (12/14/2023)    Financial Resource Strain     Difficulty of Paying Living Expenses: Not very hard     Med Affordability: No   Food Insecurity: No Food Insecurity (12/29/2023)    Food Insecurity     Food Insecurity: Never true   Transportation Needs: No Transportation Needs (12/29/2023)    Transportation Needs     Lack of Transportation: No   Housing Stability: Low Risk  (12/29/2023)    Housing Stability     Housing Instability: No              Review of Systems    Positive for stated complaint: poncho, right side chest pain, pneumonia, sent by PMD, currently have Cdiff as well  Other systems are as noted in HPI.  Constitutional and vital signs reviewed.      All other systems reviewed and negative except as noted above.    Physical Exam     ED Triage Vitals [12/29/23 1344]   BP (!) 168/101   Pulse 85   Resp 18   Temp 98.9 °F (37.2 °C)   Temp src Oral   SpO2 95 %   O2 Device None (Room air)       Current:BP (!) 181/80 (BP Location: Right arm)   Pulse 58   Temp 97.4 °F (36.3 °C) (Oral)   Resp 18   Ht 188 cm (6' 2\")   Wt 81.6 kg   SpO2 95%   BMI 23.11 kg/m²         Physical Exam  Vitals and nursing note reviewed.   Constitutional:       Appearance: Normal appearance.   HENT:      Head: Normocephalic.      Nose: Nose normal.      Mouth/Throat:      Mouth: Mucous membranes are moist.   Eyes:      Extraocular Movements: Extraocular movements intact.   Cardiovascular:      Rate and Rhythm: Normal rate and regular  rhythm.   Pulmonary:      Effort: Pulmonary effort is normal.      Comments: Scattered wheeze  Abdominal:      General: Abdomen is flat.   Musculoskeletal:         General: Normal range of motion.   Skin:     General: Skin is warm.   Neurological:      General: No focal deficit present.      Mental Status: He is alert.   Psychiatric:         Mood and Affect: Mood normal.           ED Course     Labs Reviewed   COMP METABOLIC PANEL (14) - Abnormal; Notable for the following components:       Result Value    Glucose 112 (*)     Creatinine 0.59 (*)     Albumin 2.8 (*)     Globulin  4.6 (*)     A/G Ratio 0.6 (*)     All other components within normal limits   CBC W/ DIFFERENTIAL - Abnormal; Notable for the following components:    RBC 3.49 (*)     HGB 10.9 (*)     HCT 33.8 (*)     Neutrophil Absolute Prelim 8.34 (*)     Neutrophil Absolute 8.34 (*)     Lymphocyte Absolute 0.60 (*)     All other components within normal limits   LACTIC ACID, PLASMA - Normal   LIPASE - Normal   CBC WITH DIFFERENTIAL WITH PLATELET    Narrative:     The following orders were created for panel order CBC With Differential With Platelet.  Procedure                               Abnormality         Status                     ---------                               -----------         ------                     CBC W/ DIFFERENTIAL[[id number]]          Abnormal            Final result                 Please view results for these tests on the individual orders.   COMP METABOLIC PANEL (14)   CBC WITH DIFFERENTIAL WITH PLATELET   LEGIONELLA URINE AG SEROGRP 1   STREPTOCOCCUS PNEUMONIAE AG, URINE   C. DIFFICILE(TOXIGENIC)PCR   GI STOOL PANEL BY PCR   BLOOD CULTURE   BLOOD CULTURE   SPUTUM CULTURE   ED/MRSA SCREEN BY PCR-CC             XR CHEST AP PORTABLE  (CPT=71045)    Result Date: 12/29/2023  PROCEDURE:  XR CHEST AP PORTABLE  (CPT=71045)  TECHNIQUE:  AP chest radiograph was obtained.  COMPARISON:  12/26/2023, chest CT 12/16/2023.  INDICATIONS:   poncho, right side chest pain, pneumonia, sent by PMD, currently have Cdiff as well  PATIENT STATED HISTORY: (As transcribed by Technologist)  Pt has difficulty breathing. Right side chest pain and pneumonia.                 CONCLUSION:   Postoperative changes of CABG with stable cardiac and mediastinal contours.  Reticular opacities of the right lung base and patchy airspace disease of the left upper lobe compatible with infectious/inflammatory process, not substantially changed in the interim.  Small right pleural effusion is stable.  No pneumothorax.  Left IJ chest port terminates at the cavoatrial junction.   LOCATION:  [facility]      Dictated by (CST): Richie Jay MD on 12/29/2023 at 4:40 PM     Finalized by (CST): Richie Jay MD on 12/29/2023 at 4:43 PM       XR CHEST PA + LAT CHEST (CPT=71046)    Result Date: 12/26/2023  PROCEDURE:  XR CHEST PA + LAT CHEST (CPT=71046)  INDICATIONS:  R05.1 Acute cough J69.0 Aspiration pneumonia of both lungs due to gastric secretions, unspecified part of lung (HCC)  COMPARISON:  [facility] , XR, XR CHEST PA + LAT CHEST (CPT=71046), 12/16/2023, 8:21 PM.  TECHNIQUE:  PA and lateral chest radiographs were obtained.  PATIENT STATED HISTORY: (As transcribed by Technologist)  Pt has a hx of aspiration pneumonia of both lungs. He coughs up phlegm.    FINDINGS:  CT compatible left chest port tip at the cavoatrial junction.  Metallic stent noted in the region of the right mainstem bronchus.  Median sternotomy changes noted.  Scattered surgical clips along the left hilar region.  Normal heart size pulmonary vascularity.  There is patchy consolidation in the left upper lobe that is concerning for pneumonia.  Clinical correlation recommended along with follow-up until complete resolution.  Small right pleural effusion.  Trace left pleural effusion.  No evidence of pneumothorax.  Stable COPD changes.            CONCLUSION:  There is patchy consolidation in the left upper lobe that is concerning for  pneumonia.  Clinical correlation recommended along with follow-up until complete resolution.  Small right pleural effusion.  Trace left pleural effusion.   LOCATION:  [facility]   Dictated by (CST): Immanuel Matthews MD on 12/26/2023 at 10:03 AM     Finalized by (CST): Immanuel Matthews MD on 12/26/2023 at 10:04 AM              MDM      Differential Diagnosis  53-year-old male presents today for evaluation of persistent vomiting, cough and shortness of breath that have been ongoing.  Patient was diagnosed with left upper lobe pneumonia on chest x-ray several days ago.  He is presently satting well on room air and has no increased work of breathing.  He denies any fevers although does report some chills.  He did recently get admitted for this and was found to have bacteremia.  Differential would include pneumonia, sepsis, bacteremia, C. difficile, acute kidney injury.  Plan for stool studies if he is a provide sample, blood cultures, labs along with chest x-ray.  Will reassess.    5:06 pm  Chest x-ray demonstrates potential right lower lobe and left upper lobe pneumonia.  IV antibiotics ordered.  Will admit to the hospital for continued care.  I spoke with the hospitalist in regards to admission.      External Chart Reviewed  CT scan of the chest from December 16 did not show any pulmonary embolism.    X-ray from December 26 demonstrated left upper lobe pneumonia    I reviewed the discharge summary of the consult note from his recent hospitalization for vomiting and bacteremia on December 16    I reviewed the pulmonology note from today    Discussions of Management  I spoke with the hospitalist in regards to admission  Admission disposition: 12/29/2023  5:06 PM                                        Medical Decision Making      Disposition and Plan     Clinical Impression:  1. HCAP (healthcare-associated pneumonia)    2. Diarrhea, unspecified type         Disposition:  Admit  12/29/2023  5:06 pm    Follow-up:  No follow-up  provider specified.        Medications Prescribed:  Current Discharge Medication List                            Hospital Problems       Present on Admission  Date Reviewed: 12/29/2023            ICD-10-CM Noted POA    * (Principal) HCAP (healthcare-associated pneumonia) J18.9 12/29/2023 Unknown    Diarrhea, unspecified type R19.7 12/29/2023 Unknown

## 2023-12-29 NOTE — PROGRESS NOTES
Nurse instructed per physician to walk pt and see if his O2 SATs drop with exertion. Pt states his O2 levels dropped down to 69% last night, while resting. Baseline SATs 94%, Pulse 86. Pt walked 500 feet and O2 SATs were 94 - 95%, pulse 101 - 102. No shortness of breath, color is pink, alert, and no apparent distress. Physician made aware of results, pt instructed to follow up in [facility] for diarrhea and exertional hypoxia. Pt verbalize clear understanding and agree to plan.

## 2023-12-29 NOTE — ED INITIAL ASSESSMENT (HPI)
Pneumonia dx on 12/27, possibe c.diff, shortness of breath, and vomiting. Patient had a recent admission to [facility]. Discharged before [facility].

## 2023-12-29 NOTE — TELEPHONE ENCOUNTER
Patient's oxygen sats remain in the 80s on room air. Also with new significant diarrhea. Patient directed to ER by RN. See other mychart encounter.

## 2023-12-30 ENCOUNTER — APPOINTMENT (OUTPATIENT)
Dept: CT IMAGING | Facility: HOSPITAL | Age: 53
End: 2023-12-30
Attending: STUDENT IN AN ORGANIZED HEALTH CARE EDUCATION/TRAINING PROGRAM
Payer: COMMERCIAL

## 2023-12-30 LAB
ADENOVIRUS F 40/41 PCR: NEGATIVE
ADENOVIRUS PCR:: NOT DETECTED
ALBUMIN SERPL-MCNC: 2.7 G/DL (ref 3.4–5)
ALBUMIN/GLOB SERPL: 0.6 {RATIO} (ref 1–2)
ALP LIVER SERPL-CCNC: 106 U/L
ALT SERPL-CCNC: 22 U/L
ANION GAP SERPL CALC-SCNC: 4 MMOL/L (ref 0–18)
AST SERPL-CCNC: 19 U/L (ref 15–37)
ASTROVIRUS PCR: NEGATIVE
B PARAPERT DNA SPEC QL NAA+PROBE: NOT DETECTED
B PERT DNA SPEC QL NAA+PROBE: NOT DETECTED
BASOPHILS # BLD AUTO: 0.04 X10(3) UL (ref 0–0.2)
BASOPHILS NFR BLD AUTO: 0.5 %
BILIRUB SERPL-MCNC: 0.3 MG/DL (ref 0.1–2)
BUN BLD-MCNC: 16 MG/DL (ref 9–23)
C CAYETANENSIS DNA SPEC QL NAA+PROBE: NEGATIVE
C DIFF GDH + TOXINS A+B STL QL IA.RAPID: DETECTED
C DIFF TOX B STL QL: POSITIVE
C PNEUM DNA SPEC QL NAA+PROBE: NOT DETECTED
CALCIUM BLD-MCNC: 8.9 MG/DL (ref 8.5–10.1)
CAMPY SP DNA.DIARRHEA STL QL NAA+PROBE: NEGATIVE
CHLORIDE SERPL-SCNC: 108 MMOL/L (ref 98–112)
CO2 SERPL-SCNC: 27 MMOL/L (ref 21–32)
CORONAVIRUS 229E PCR:: NOT DETECTED
CORONAVIRUS HKU1 PCR:: NOT DETECTED
CORONAVIRUS NL63 PCR:: NOT DETECTED
CORONAVIRUS OC43 PCR:: NOT DETECTED
CREAT BLD-MCNC: 0.53 MG/DL
CRYPTOSP DNA SPEC QL NAA+PROBE: NEGATIVE
EAEC PAA PLAS AGGR+AATA ST NAA+NON-PRB: NEGATIVE
EC STX1+STX2 + H7 FLIC SPEC NAA+PROBE: NEGATIVE
EGFRCR SERPLBLD CKD-EPI 2021: 119 ML/MIN/1.73M2 (ref 60–?)
ENTAMOEBA HISTOLYTICA PCR: NEGATIVE
EOSINOPHIL # BLD AUTO: 0.23 X10(3) UL (ref 0–0.7)
EOSINOPHIL NFR BLD AUTO: 2.6 %
EPEC EAE GENE STL QL NAA+NON-PROBE: NEGATIVE
ERYTHROCYTE [DISTWIDTH] IN BLOOD BY AUTOMATED COUNT: 13.2 %
ETEC LTA+ST1A+ST1B TOX ST NAA+NON-PROBE: NEGATIVE
FLUAV RNA SPEC QL NAA+PROBE: NOT DETECTED
FLUBV RNA SPEC QL NAA+PROBE: NOT DETECTED
GIARDIA LAMBLIA PCR: NEGATIVE
GLOBULIN PLAS-MCNC: 4.5 G/DL (ref 2.8–4.4)
GLUCOSE BLD-MCNC: 111 MG/DL (ref 70–99)
HCT VFR BLD AUTO: 33.5 %
HGB BLD-MCNC: 10.4 G/DL
IMM GRANULOCYTES # BLD AUTO: 0.12 X10(3) UL (ref 0–1)
IMM GRANULOCYTES NFR BLD: 1.4 %
L PNEUMO AG UR QL: NEGATIVE
LYMPHOCYTES # BLD AUTO: 0.56 X10(3) UL (ref 1–4)
LYMPHOCYTES NFR BLD AUTO: 6.4 %
MCH RBC QN AUTO: 30.2 PG (ref 26–34)
MCHC RBC AUTO-ENTMCNC: 31 G/DL (ref 31–37)
MCV RBC AUTO: 97.4 FL
METAPNEUMOVIRUS PCR:: NOT DETECTED
MONOCYTES # BLD AUTO: 0.59 X10(3) UL (ref 0.1–1)
MONOCYTES NFR BLD AUTO: 6.8 %
MRSA DNA SPEC QL NAA+PROBE: NEGATIVE
MYCOPLASMA PNEUMONIA PCR:: NOT DETECTED
NEUTROPHILS # BLD AUTO: 7.2 X10 (3) UL (ref 1.5–7.7)
NEUTROPHILS # BLD AUTO: 7.2 X10(3) UL (ref 1.5–7.7)
NEUTROPHILS NFR BLD AUTO: 82.3 %
NOROVIRUS GI/GII PCR: NEGATIVE
OSMOLALITY SERPL CALC.SUM OF ELEC: 290 MOSM/KG (ref 275–295)
P SHIGELLOIDES DNA STL QL NAA+PROBE: NEGATIVE
PARAINFLUENZA 1 PCR:: NOT DETECTED
PARAINFLUENZA 2 PCR:: NOT DETECTED
PARAINFLUENZA 3 PCR:: NOT DETECTED
PARAINFLUENZA 4 PCR:: NOT DETECTED
PLATELET # BLD AUTO: 244 10(3)UL (ref 150–450)
POTASSIUM SERPL-SCNC: 3.4 MMOL/L (ref 3.5–5.1)
PROCALCITONIN SERPL-MCNC: 0.2 NG/ML (ref ?–0.16)
PROT SERPL-MCNC: 7.2 G/DL (ref 6.4–8.2)
RBC # BLD AUTO: 3.44 X10(6)UL
RHINOVIRUS/ENTERO PCR:: NOT DETECTED
ROTAVIRUS A PCR: NEGATIVE
RSV RNA SPEC QL NAA+PROBE: NOT DETECTED
SALMONELLA DNA SPEC QL NAA+PROBE: NEGATIVE
SAPOVIRUS PCR: NEGATIVE
SARS-COV-2 RNA NPH QL NAA+NON-PROBE: NOT DETECTED
SHIGELLA SP+EIEC IPAH ST NAA+NON-PROBE: NEGATIVE
SODIUM SERPL-SCNC: 139 MMOL/L (ref 136–145)
STREP PNEUMO ANTIGEN, URINE: NEGATIVE
V CHOLERAE DNA SPEC QL NAA+PROBE: NEGATIVE
VIBRIO DNA SPEC NAA+PROBE: NEGATIVE
WBC # BLD AUTO: 8.7 X10(3) UL (ref 4–11)
YERSINIA DNA SPEC NAA+PROBE: NEGATIVE

## 2023-12-30 PROCEDURE — 99232 SBSQ HOSP IP/OBS MODERATE 35: CPT | Performed by: INTERNAL MEDICINE

## 2023-12-30 PROCEDURE — 99223 1ST HOSP IP/OBS HIGH 75: CPT | Performed by: INTERNAL MEDICINE

## 2023-12-30 PROCEDURE — 74177 CT ABD & PELVIS W/CONTRAST: CPT | Performed by: STUDENT IN AN ORGANIZED HEALTH CARE EDUCATION/TRAINING PROGRAM

## 2023-12-30 PROCEDURE — 71260 CT THORAX DX C+: CPT | Performed by: STUDENT IN AN ORGANIZED HEALTH CARE EDUCATION/TRAINING PROGRAM

## 2023-12-30 RX ORDER — HYDROMORPHONE HYDROCHLORIDE 1 MG/ML
1.2 INJECTION, SOLUTION INTRAMUSCULAR; INTRAVENOUS; SUBCUTANEOUS EVERY 2 HOUR PRN
Status: DISCONTINUED | OUTPATIENT
Start: 2023-12-30 | End: 2024-01-03

## 2023-12-30 RX ORDER — HYDROMORPHONE HYDROCHLORIDE 1 MG/ML
0.4 INJECTION, SOLUTION INTRAMUSCULAR; INTRAVENOUS; SUBCUTANEOUS EVERY 2 HOUR PRN
Status: DISCONTINUED | OUTPATIENT
Start: 2023-12-30 | End: 2024-01-03

## 2023-12-30 RX ORDER — DOXYCYCLINE HYCLATE 100 MG/1
100 CAPSULE ORAL EVERY 12 HOURS SCHEDULED
Status: DISCONTINUED | OUTPATIENT
Start: 2023-12-30 | End: 2024-01-03

## 2023-12-30 RX ORDER — HYDROMORPHONE HYDROCHLORIDE 1 MG/ML
0.8 INJECTION, SOLUTION INTRAMUSCULAR; INTRAVENOUS; SUBCUTANEOUS EVERY 2 HOUR PRN
Status: DISCONTINUED | OUTPATIENT
Start: 2023-12-30 | End: 2024-01-03

## 2023-12-30 RX ORDER — VANCOMYCIN HYDROCHLORIDE 125 MG/1
125 CAPSULE ORAL EVERY 6 HOURS
Status: DISCONTINUED | OUTPATIENT
Start: 2023-12-30 | End: 2024-01-03

## 2023-12-30 RX ORDER — IPRATROPIUM BROMIDE AND ALBUTEROL SULFATE 2.5; .5 MG/3ML; MG/3ML
3 SOLUTION RESPIRATORY (INHALATION)
Status: DISCONTINUED | OUTPATIENT
Start: 2023-12-30 | End: 2024-01-01

## 2023-12-30 NOTE — CONSULTS
[facility] Pulmonary Consult Note       Dontae Cortez Jr. Patient Status:  Inpatient    10/15/1970 MRN [id number]   Location [facility] 4NW-A Attending Jose Roberto Myles, DO   Hosp Day # 1 PCP Adrian Horowitz MD     Reason for Consultation:  pnuemonia    History of Present Illness:  Dontae Cortez Jr. is a a(n) 53 year old male who presents for worsening diarrhea after presenting in clinic.  He was recently hospitalized for covid pneumonia.  He has been hospitalized for metastatic esophageal adeno ca.  At his most recent hospitalization, his stent migrated so it was removed.  Reports ongoing watery diarrhea and dry cough.  Workup in the ER revealed covid + still as well as c diff +.    History:  Past Medical History:   Diagnosis Date    Back problem     Belching 2022    Black stools 2022    Borderline diabetes     Dx in 2013 - HgA1C 6.2%    C. difficile diarrhea 10/23/2022    pt treated and without symptoms    Chest pain 2022    Coronary artery disease     On 13: CABG x 4 with LIMA to LAD and SVG to diagonal, OM and PDA    Decorative tattoo 2000    Depression     Esophageal cancer (HCC)     completed chemo    Esophageal reflux     Essential hypertension     Exposure to medical diagnostic radiation     last tx 2022    Frequent urination 2013    Gastroparesis     Heartburn 2022    High blood pressure     High cholesterol 2013    Found when I had quadruple bypass    History of COVID-19 10/16/2022    asymptomatic - pt was dx during a hospitalization for another diagnosis. No continued symptoms    History of stomach ulcers     Hyperlipidemia     Hyperlipidemia LDL goal < 70     Indigestion 2022    Morbid obesity with BMI of 40.0-44.9, adult (HCC)     Muscle weakness     Nontoxic multinodular goiter     Dx in 2013: pt was told that imaging showed thyroid cysts per PCP    Pancreatic cancer (HCC)     last dose 2023 is scheduled for another  round 12/27/23    Peripheral vascular disease (HCC)     pt denies    Personal history of antineoplastic chemotherapy     for esophageal cancer/completed    Personal history of antineoplastic chemotherapy     pancreatic cancer    Problems with swallowing 02/01/2022    Pulmonary nodules     Dx in 8/2013: CT chest showed small bilateral fissural-based lung nodules less than 1 cm    S/P CABG x 4     On 8/16/13: CABG x 4 with LIMA to LAD and SVG to diagonal, OM and PDA    Shortness of breath     Vomiting 02/01/2022     Past Surgical History:   Procedure Laterality Date    APPENDECTOMY      APPENDECTOMY      ARTHROSCOPY OF JOINT UNLISTED      right shoulder    CABG      On 8/16/13: CABG x 4 with LIMA to LAD and SVG to diagonal, OM and PDA    CARDIAC CATH LAB      On 8/14/2013: cardiac cath showed 3-vessel disease    OTHER SURGICAL HISTORY      1.       Laparoscopic robotic-assisted esophagogastrectomy.     Family History   Problem Relation Age of Onset    Cancer Mother         breast and colon     Diabetes Neg       reports that he quit smoking about 10 years ago. His smoking use included cigarettes. He has never used smokeless tobacco. He reports that he does not currently use alcohol. He reports that he does not use drugs.    Allergies:  No Known Allergies    Medications:    Current Facility-Administered Medications:     HYDROmorphone (Dilaudid) 1 MG/ML injection 0.4 mg, 0.4 mg, Intravenous, Q2H PRN **OR** HYDROmorphone (Dilaudid) 1 MG/ML injection 0.8 mg, 0.8 mg, Intravenous, Q2H PRN **OR** HYDROmorphone (Dilaudid) 1 MG/ML injection 1.2 mg, 1.2 mg, Intravenous, Q2H PRN    vancomycin (Vancocin) cap 125 mg, 125 mg, Oral, Q6H    ipratropium-albuterol (Duoneb) 0.5-2.5 (3) MG/3ML inhalation solution 3 mL, 3 mL, Nebulization, 6 times per day    potassium chloride 40 mEq in 250mL sodium chloride 0.9% IVPB premix, 40 mEq, Intravenous, Once    pancrelipase (Lip-Prot-Amyl) (Zenpep) DR particles cap 25,000 Units, 25,000 Units,  Oral, TID CC    sodium chloride 0.9% infusion, , Intravenous, Continuous    enoxaparin (Lovenox) 40 MG/0.4ML SUBQ injection 40 mg, 40 mg, Subcutaneous, Daily    acetaminophen (Tylenol Extra Strength) tab 500 mg, 500 mg, Oral, Q4H PRN    melatonin tab 3 mg, 3 mg, Oral, Nightly PRN    polyethylene glycol (PEG 3350) (Miralax) 17 g oral packet 17 g, 17 g, Oral, Daily PRN    sennosides (Senokot) tab 17.2 mg, 17.2 mg, Oral, Nightly PRN    bisacodyl (Dulcolax) 10 MG rectal suppository 10 mg, 10 mg, Rectal, Daily PRN    fleet enema (Fleet) 7-19 GM/118ML rectal enema 133 mL, 1 enema, Rectal, Once PRN    ondansetron (Zofran) 4 MG/2ML injection 4 mg, 4 mg, Intravenous, Q6H PRN    prochlorperazine (Compazine) 10 MG/2ML injection 5 mg, 5 mg, Intravenous, Q8H PRN    benzonatate (Tessalon) cap 200 mg, 200 mg, Oral, TID PRN    glycerin-hypromellose- (Artifical Tears) 0.2-0.2-1 % ophthalmic solution 1 drop, 1 drop, Both Eyes, QID PRN    sodium chloride (Saline Mist) 0.65 % nasal solution 1 spray, 1 spray, Each Nare, Q3H PRN    pantoprazole (Protonix) 40 mg in sodium chloride 0.9% PF 10 mL IV push, 40 mg, Intravenous, Q12H    fluticasone furoate-vilanterol (Breo Ellipta) 100-25 MCG/ACT inhaler 1 puff, 1 puff, Inhalation, Daily    piperacillin-tazobactam (Zosyn) 3.375 g in dextrose 5% 100 mL IVPB-ADDV, 3.375 g, Intravenous, Q8H    guaiFENesin-codeine (Robitussin AC) 100-10 MG/5ML oral solution 5 mL, 5 mL, Oral, Q4H PRN    heparin (Porcine) 100 Units/mL lock flush 500 Units, 5 mL, Intravenous, PRN    fluticasone propionate (Flonase) 50 MCG/ACT nasal suspension 1 spray, 1 spray, Each Nare, Daily    baclofen (Lioresal) tab 10 mg, 10 mg, Oral, TID PRN    cetirizine (ZyrTEC) tab 5 mg, 5 mg, Oral, Daily    losartan (Cozaar) tab 50 mg, 50 mg, Oral, Daily    metoprolol succinate ER (Toprol XL) 24 hr tab 50 mg, 50 mg, Oral, Daily Beta Blocker    sertraline (Zoloft) tab 100 mg, 100 mg, Oral, Daily    sodium chloride (hypertonic) 3 %  nebulizer solution 3 mL, 3 mL, Nebulization, Q8H    sucralfate (Carafate) tab 1 g, 1 g, Oral, TID PRN    diphenhydrAMINE (Benadryl) 50 mg/mL  injection 25 mg, 25 mg, Intravenous, Q8H PRN    hydrALAzine (Apresoline) 20 mg/mL injection 5 mg, 5 mg, Intravenous, Q6H PRN    Review of Systems:   All other review of systems are negative.    Vital signs in last 24 hours:  Patient Vitals for the past 24 hrs:   BP Temp Temp src Pulse Resp SpO2 Height Weight   12/30/23 0422 (!) 164/82 97.2 °F (36.2 °C) Oral 62 20 91 % -- --   12/29/23 2300 (!) 181/80 97.4 °F (36.3 °C) Oral 58 18 95 % -- --   12/29/23 2141 (!) 174/98 -- -- 54 16 96 % -- --   12/29/23 1957 -- -- -- 56 16 97 % -- --   12/29/23 1814 153/87 -- -- 60 16 97 % -- --   12/29/23 1615 (!) 162/88 -- -- 76 15 97 % -- --   12/29/23 1344 (!) 168/101 98.9 °F (37.2 °C) Oral 85 18 95 % 6' 2\" (1.88 m) 180 lb (81.6 kg)       Intake/Output:    Intake/Output Summary (Last 24 hours) at 12/30/2023 1303  Last data filed at 12/30/2023 0431  Gross per 24 hour   Intake --   Output 100 ml   Net -100 ml       Physical Exam:   General: alert, cooperative, oriented.  No respiratory distress.   Head: Normocephalic, without obvious abnormality, atraumatic.   Eyes: Conjunctivae/corneas clear.  No scleral icterus.  No conjunctival     hemorrhage.   Nose: Nares normal.   Throat: Lips, mucosa, and tongue normal.  No thrush noted.   Neck: Soft, supple neck; trachea midline, no adenopathy, no thyromegaly.   Lungs: diminished breath sounds bilaterally   Chest wall: No tenderness or deformity.   Heart: Regular rate and rhythm, normal S1S2, no murmur.   Abdomen: soft, non-tender, non-distended, no masses, no guarding, no     rebound, positive BS.   Extremity: no edema, no cyanosis   Skin: No rashes or lesions.   Neurological: Alert, interactive, no focal deficits    Lab Data Review:  Recent Labs   Lab 12/26/23  0748 12/29/23  1555 12/30/23  0531   WBC 13.8* 9.9 8.7   HGB 11.1* 10.9* 10.4*   HCT 35.0*  33.8* 33.5*   .0 278.0 244.0       Recent Labs   Lab 12/26/23  0748 12/29/23  1556 12/30/23  0531    139 139   K 4.0 3.6 3.4*    107 108   CO2 27.0 28.0 27.0   BUN 10 13 16   ALT 28 26 22   AST 22 19 19       No results for input(s): \"MG\" in the last 168 hours.    Lab Results   Component Value Date    PHOS 3.5 11/27/2023        No results for input(s): \"PT\", \"INR\", \"PTT\" in the last 168 hours.    No results for input(s): \"ABGPHT\", \"UCNBZS2B\", \"AKNZJ1I\", \"ABGHCO3\", \"ABGBE\", \"TEMP\", \"TACOS\", \"SITE\", \"DEV\", \"THGB\" in the last 168 hours.    Invalid input(s): \"JHI63EPU\", \"CHOB\"    Invalid input(s): \"CKTOTAL\", \"TROPONINI\", \"TROPONINT\", \"CKMBINDEX\"      Cultures:   No results found for this visit on 12/29/23.    Radiology:  CT CHEST+ABDOMEN+PELVIS(ALL CNTRST ONLY)(PJE=68146/[id number])  Narrative: PROCEDURE:  CT CHEST+ABDOMEN+PELVIS(ALL CNTRST ONLY)(CPT=71260/16888)     COMPARISON:  [facility], CT, CT CHEST+ABDOMEN+PELVIS(ALL CNTRST ONLY)(CPT=71260/94492), 3/20/2023, 8:43 AM.  [facility], CT, CT CHEST+ABDOMEN+PELVIS(ALL CNTRST ONLY)(CPT=71260/75522), 2/28/2023, 7:33 AM.  [facility] , CT, CT CHEST PE AORTA (IV ONLY)   (CPT=71260), 12/16/2023, 9:36 PM.  [facility], XR, XR CHEST AP PORTABLE  (CPT=71045), 12/29/2023, 4:24 PM.     INDICATIONS:  Acute onset of chest and abdominal pain.     TECHNIQUE:  IV contrast-enhanced scanning through the chest, abdomen, and pelvis was performed.  Dose reduction techniques were used. Dose information is transmitted to the ACR (American College of Radiology) NRDR (National Radiology Data Registry) which   includes the Dose Index Registry.     PATIENT STATED HISTORY:(As transcribed by Technologist)  sudden onset chest/abdominal pain      CONTRAST USED:  80cc of Isovue 370     FINDINGS:       CHEST:    LUNGS:  There are increasing ground-glass opacities throughout the left upper lobe as well as to a lesser degree within the left lower lobe.  This is seen over a background of reticular  nodular opacities that are diffusely seen throughout the lower lobes   and right middle lobe.  These opacities also appear to be somewhat increased in severity from the previous study.  MEDIASTINUM:  No mediastinal mass lesions or adenopathy.  Multiple shoddy mediastinal lymph nodes.  Changes of gastric pull-through are stable from prior imaging.  AIDAN:  No mass or adenopathy.    CARDIAC:  Stable changes of prior CABG.  Mild cardiomegaly.  No pericardial effusion.  Extensive atherosclerotic calcifications of the native coronary vessels.  PLEURA:  No mass or effusion.    CHEST WALL:  No mass or axillary adenopathy.    AORTA:  Ectasia of the ascending thoracic aorta measuring 41 mm.  Mild atherosclerotic changes.    VASCULATURE:  No visible pulmonary arterial thrombus or attenuation.       ABDOMEN/PELVIS:  LIVER:  No enlargement, atrophy, abnormal density, or significant focal lesion.    BILIARY:  No visible dilatation or calcification.    PANCREAS:  Diffuse pancreatic atrophy with fatty infiltration.  SPLEEN:  Borderline splenomegaly measuring 13.7 cm.   KIDNEYS:  No mass, obstruction, or calcification.    ADRENALS:  No mass or enlargement.    AORTA:  Mild atherosclerotic calcifications of the aorta and iliac vessels.  No ectasia or aneurysm.  RETROPERITONEUM:  No mass or adenopathy.    BOWEL/MESENTERY:  The stomach, duodenum sweep and small bowel are unremarkable.  Colonic wall thickening along the descending colon and rectosigmoid colon suspicious for colitis.  Mild scattered diverticular changes without acute diverticulitis.  ABDOMINAL WALL:  No mass or hernia.    URINARY BLADDER:  No visible focal wall thickening, lesion, or calculus.    PELVIC NODES:  No adenopathy.    PELVIC ORGANS:  No visible mass.  Pelvic organs appropriate for patient age.    BONES:  No bony lesion or fracture.                     Impression: CONCLUSION:    1. Relative to the prior exam, there are increasing ground-glass opacities  throughout the left upper lobe and to a lesser degree the left lower lobe, superimposed over diffuse reticular nodular opacities, suggestive of a progressive atypical pneumonia.  2. Suggestion of colitis involving the descending and rectosigmoid colon.  3. Please see the body of the report above for further, less significant details.  The preliminary report was reviewed.           LOCATION:  [facility]           Dictated by (CST): Joseph Mcdaniels DO on 12/30/2023 at 9:50 AM       Finalized by (CST): Joseph Mcdaniels DO on 12/30/2023 at 9:58 AM         Assessment:  Sepsis secondary to C diff positive and COVID positive.  Left lung infiltrate noted, which is new compared to CT from 12/16  C Diff positive  COVID +   Possible HCAP  Esophageal cancer c/b post op anasthomatoc leak  Pancreatic mass s/p stent insertion that was removed on 12/12/23      Plan:  Started on PO Vancomycin  Started on zosyn   Will obtain ID consult  Will obtain noninvasive serologies  Check procalcitinon and trend  Started on breo, continue  If no improvement in breathing after about 48 hours of treatment would consider bronchoscopy     Thank you for the consultation.  Will follow with you.    Rey Licea MD  [facility]  Office: [phone]

## 2023-12-30 NOTE — CONSULTS
INFECTIOUS DISEASE CONSULTATION    Dontae Goodwin Diego Champion Patient Status:  Inpatient    10/15/1970 MRN [id number]   Location [facility] 4NW-A Attending Jose Roberto Myles, DO   Hosp Day # 1 PCP Adrian Horowitz MD       Requested by      Reason for Consultation:  Recurrent C Diff     History of Present Illness:  Dontae Cortez Jr. 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 The patient presents to the hospital  feeling unwell shortly after his recent discharge. Previously admitted this month - for covid and strep bacteremia for which he was discharged on augmentin to complete course through . Patient reports he was doing well for 1-2 days then he had an episode of coughing after drinking. No fevers. On  developed diarrhea, had 4 episodes of diarrhea and a couple overnight / this morning.  C diff positive.     History:  Past Medical History:   Diagnosis Date    Back problem     Belching 2022    Black stools 2022    Borderline diabetes     Dx in 2013 - HgA1C 6.2%    C. difficile diarrhea 10/23/2022    pt treated and without symptoms    Chest pain 2022    Coronary artery disease     On 13: CABG x 4 with LIMA to LAD and SVG to diagonal, OM and PDA    Decorative tattoo 2000    Depression     Esophageal cancer (HCC)     completed chemo    Esophageal reflux     Essential hypertension     Exposure to medical diagnostic radiation     last tx 2022    Frequent urination 2013    Gastroparesis     Heartburn 2022    High blood pressure     High cholesterol 2013    Found when I had quadruple bypass     History of COVID-19 10/16/2022    asymptomatic - pt was dx during a hospitalization for another diagnosis. No continued symptoms    History of stomach ulcers     Hyperlipidemia     Hyperlipidemia LDL goal < 70     Indigestion 02/01/2022    Morbid obesity with BMI of 40.0-44.9, adult (HCC)     Muscle weakness     Nontoxic multinodular goiter     Dx in 8/2013: pt was told that imaging showed thyroid cysts per PCP    Pancreatic cancer (HCC)     last dose 12/4/2023 is scheduled for another round 12/27/23    Peripheral vascular disease (HCC)     pt denies    Personal history of antineoplastic chemotherapy     for esophageal cancer/completed    Personal history of antineoplastic chemotherapy     pancreatic cancer    Problems with swallowing 02/01/2022    Pulmonary nodules     Dx in 8/2013: CT chest showed small bilateral fissural-based lung nodules less than 1 cm    S/P CABG x 4     On 8/16/13: CABG x 4 with LIMA to LAD and SVG to diagonal, OM and PDA    Shortness of breath     Vomiting 02/01/2022     Past Surgical History:   Procedure Laterality Date    APPENDECTOMY      APPENDECTOMY      ARTHROSCOPY OF JOINT UNLISTED      right shoulder    CABG      On 8/16/13: CABG x 4 with LIMA to LAD and SVG to diagonal, OM and PDA    CARDIAC CATH LAB      On 8/14/2013: cardiac cath showed 3-vessel disease    OTHER SURGICAL HISTORY      1.       Laparoscopic robotic-assisted esophagogastrectomy.     Family History   Problem Relation Age of Onset    Cancer Mother         breast and colon     Diabetes Neg       reports that he quit smoking about 10 years ago. His smoking use included cigarettes. He has never used smokeless tobacco. He reports that he does not currently use alcohol. He reports that he does not use drugs.      Allergies:  No Known Allergies    Medications:    Current Facility-Administered Medications:     HYDROmorphone (Dilaudid) 1 MG/ML injection 0.4 mg, 0.4 mg, Intravenous, Q2H PRN **OR** HYDROmorphone (Dilaudid) 1  MG/ML injection 0.8 mg, 0.8 mg, Intravenous, Q2H PRN **OR** HYDROmorphone (Dilaudid) 1 MG/ML injection 1.2 mg, 1.2 mg, Intravenous, Q2H PRN    vancomycin (Vancocin) cap 125 mg, 125 mg, Oral, Q6H    ipratropium-albuterol (Duoneb) 0.5-2.5 (3) MG/3ML inhalation solution 3 mL, 3 mL, Nebulization, 6 times per day    potassium chloride 40 mEq in 250mL sodium chloride 0.9% IVPB premix, 40 mEq, Intravenous, Once    pancrelipase (Lip-Prot-Amyl) (Zenpep) DR particles cap 25,000 Units, 25,000 Units, Oral, TID CC    sodium chloride 0.9% infusion, , Intravenous, Continuous    enoxaparin (Lovenox) 40 MG/0.4ML SUBQ injection 40 mg, 40 mg, Subcutaneous, Daily    acetaminophen (Tylenol Extra Strength) tab 500 mg, 500 mg, Oral, Q4H PRN    melatonin tab 3 mg, 3 mg, Oral, Nightly PRN    polyethylene glycol (PEG 3350) (Miralax) 17 g oral packet 17 g, 17 g, Oral, Daily PRN    sennosides (Senokot) tab 17.2 mg, 17.2 mg, Oral, Nightly PRN    bisacodyl (Dulcolax) 10 MG rectal suppository 10 mg, 10 mg, Rectal, Daily PRN    fleet enema (Fleet) 7-19 GM/118ML rectal enema 133 mL, 1 enema, Rectal, Once PRN    ondansetron (Zofran) 4 MG/2ML injection 4 mg, 4 mg, Intravenous, Q6H PRN    prochlorperazine (Compazine) 10 MG/2ML injection 5 mg, 5 mg, Intravenous, Q8H PRN    benzonatate (Tessalon) cap 200 mg, 200 mg, Oral, TID PRN    glycerin-hypromellose- (Artifical Tears) 0.2-0.2-1 % ophthalmic solution 1 drop, 1 drop, Both Eyes, QID PRN    sodium chloride (Saline Mist) 0.65 % nasal solution 1 spray, 1 spray, Each Nare, Q3H PRN    pantoprazole (Protonix) 40 mg in sodium chloride 0.9% PF 10 mL IV push, 40 mg, Intravenous, Q12H    fluticasone furoate-vilanterol (Breo Ellipta) 100-25 MCG/ACT inhaler 1 puff, 1 puff, Inhalation, Daily    piperacillin-tazobactam (Zosyn) 3.375 g in dextrose 5% 100 mL IVPB-ADDV, 3.375 g, Intravenous, Q8H    guaiFENesin-codeine (Robitussin AC) 100-10 MG/5ML oral solution 5 mL, 5 mL, Oral, Q4H PRN    heparin (Porcine) 100  Units/mL lock flush 500 Units, 5 mL, Intravenous, PRN    fluticasone propionate (Flonase) 50 MCG/ACT nasal suspension 1 spray, 1 spray, Each Nare, Daily    baclofen (Lioresal) tab 10 mg, 10 mg, Oral, TID PRN    cetirizine (ZyrTEC) tab 5 mg, 5 mg, Oral, Daily    losartan (Cozaar) tab 50 mg, 50 mg, Oral, Daily    metoprolol succinate ER (Toprol XL) 24 hr tab 50 mg, 50 mg, Oral, Daily Beta Blocker    sertraline (Zoloft) tab 100 mg, 100 mg, Oral, Daily    sodium chloride (hypertonic) 3 % nebulizer solution 3 mL, 3 mL, Nebulization, Q8H    sucralfate (Carafate) tab 1 g, 1 g, Oral, TID PRN    diphenhydrAMINE (Benadryl) 50 mg/mL  injection 25 mg, 25 mg, Intravenous, Q8H PRN    hydrALAzine (Apresoline) 20 mg/mL injection 5 mg, 5 mg, Intravenous, Q6H PRN  Current Facility-Administered Medications on File Prior to Encounter   Medication Dose Route Frequency Provider Last Rate Last Admin    [COMPLETED] trastuzumab-qyyp (Trazimera) 504 mg in sodium chloride 0.9% 274 mL infusion  6 mg/kg (Treatment Plan Recorded) Intravenous Once Nataly Siddiqui APRN   Stopped at 12/26/23 0930    [COMPLETED] sodium chloride 0.9 % IV bolus 500 mL  500 mL Intravenous Once Freddy Hernandez MD   Stopped at 12/16/23 2055    [COMPLETED] ipratropium-albuterol (Duoneb) 0.5-2.5 (3) MG/3ML inhalation solution 3 mL  3 mL Nebulization Once Freddy Hernandez MD   3 mL at 12/16/23 1950    [COMPLETED] HYDROmorphone (Dilaudid) 1 MG/ML injection 0.5 mg  0.5 mg Intravenous Q30 Min PRN Freddy Hernandez MD   0.5 mg at 12/16/23 2239    [COMPLETED] iohexol (Omnipaque) 350 MG/ML injection via power injector 100 mL  100 mL Intravenous ONCE PRN Freddy Hernandez MD   100 mL at 12/16/23 2142    [COMPLETED] morphINE PF 4 MG/ML injection 4 mg  4 mg Intravenous Q30 Min PRN Wero Chiang MD   4 mg at 12/19/23 0813    [COMPLETED] magnesium sulfate in sterile water for injection 2 g/50mL IVPB premix 2 g  2 g Intravenous Once Megan Arshad MD 50 mL/hr at 12/12/23 1413 2 g at  23 1413    [] sodium chloride 0.9% infusion   Intravenous Continuous Nikia Fairchild MD        [] HYDROmorphone (Dilaudid) 1 MG/ML injection 1 mg  1 mg Intravenous Q30 Min PRN Nikia Fairchild MD        [] ondansetron (Zofran) 4 MG/2ML injection 4 mg  4 mg Intravenous Q4H PRN Nikia Fairchild MD        [COMPLETED] iopamidol 76% (ISOVUE-370) injection for power injector  100 mL Intravenous ONCE PRN Nikia Fairchild MD   100 mL at 12/10/23 2109    [COMPLETED] hydrALAzine (Apresoline) 20 mg/mL injection 10 mg  10 mg Intravenous Once Nikia Fairchild MD   10 mg at 12/10/23 2231    [COMPLETED] pembrolizumab (Keytruda) 400 mg in sodium chloride 0.9% 116 mL IVPB  400 mg Intravenous Once Grecia Angelo APRN   Stopped at 23 1243    [COMPLETED] trastuzumab-qyyp (Trazimera) 504 mg in sodium chloride 0.9% 274 mL infusion  6 mg/kg (Treatment Plan Recorded) Intravenous Once Grecia Angelo APRN   Stopped at 23 1319    [COMPLETED] magnesium sulfate in sterile water for injection 2 g/50mL IVPB premix 2 g  2 g Intravenous Once Glen Myles MD 50 mL/hr at 23 2 g at 23    [COMPLETED] morphINE PF 4 MG/ML injection 4 mg  4 mg Intravenous Once Dede Jacques MD   4 mg at 23 1125    [COMPLETED] morphINE PF 2 MG/ML injection 2 mg  2 mg Intravenous Once Dede Jacques MD   2 mg at 23 1337    [COMPLETED] iopamidol 76% (ISOVUE-370) injection for power injector  75 mL Intravenous ONCE PRN Dede Jacques MD   75 mL at 23 1347    [COMPLETED] piperacillin-tazobactam (Zosyn) 4.5 g in dextrose 5% 100 mL IVPB-ADDV  4.5 g Intravenous Once Dede Jacques  mL/hr at 23 1517 4.5 g at 23 1517    [COMPLETED] vancomycin (Vancocin) 1.25 g in sodium chloride 0.9% 250mL IVPB premix  15 mg/kg Intravenous Once Glen Myles .7 mL/hr at 23 1758 1,250 mg at 23 175    [COMPLETED] morphINE PF 2 MG/ML injection 2 mg   2 mg Intravenous Once Dede Jacques MD   2 mg at 11/26/23 1528    [COMPLETED] influenza vaccine split quad (Fluzone QIV) 0.5 mL IM injection (ages 6 months to 64 years) 0.5 mL  0.5 mL Intramuscular Prior to discharge Eitan Landeros MD   0.5 mL at 11/11/23 1240    [COMPLETED] potassium chloride 20 mEq/100mL IVPB premix 20 mEq  20 mEq Intravenous Once Eitan Landeros MD   Stopped at 11/10/23 1751    [COMPLETED] magnesium sulfate in sterile water for injection 2 g/50mL IVPB premix 2 g  2 g Intravenous Once Eitan Landeros MD   Stopped at 11/10/23 1751    [COMPLETED] iron sucrose (Venofer) 20 mg/mL injection 200 mg  200 mg Intravenous Daily Senia Foster MD   200 mg at 11/10/23 0927    [COMPLETED] magnesium sulfate in sterile water for injection 2 g/50mL IVPB premix 2 g  2 g Intravenous Once Eitan Landeros MD 50 mL/hr at 11/08/23 0834 2 g at 11/08/23 0834    [COMPLETED] iopamidol 76% (ISOVUE-370) injection for power injector  80 mL Intravenous ONCE PRN Eitan Landeros MD   80 mL at 11/08/23 1612    [COMPLETED] morphINE PF 4 MG/ML injection 4 mg  4 mg Intravenous Once Freddy Duff MD   4 mg at 11/06/23 1519    [COMPLETED] ondansetron (Zofran) 4 MG/2ML injection 4 mg  4 mg Intravenous Once Freddy Duff MD   4 mg at 11/06/23 1519    [COMPLETED] trastuzumab-qyyp (Trazimera) 588 mg in sodium chloride 0.9% 278 mL infusion  6 mg/kg (Treatment Plan Recorded) Intravenous Once Senia Foster MD   Stopped at 10/31/23 1442    [COMPLETED] pembrolizumab (Keytruda) 400 mg in sodium chloride 0.9% 116 mL IVPB  400 mg Intravenous Once Senia Foster MD   Stopped at 10/09/23 1040    [COMPLETED] trastuzumab-qyyp (Trazimera) 588 mg in sodium chloride 0.9% 278 mL infusion  6 mg/kg (Treatment Plan Recorded) Intravenous Once Senia Foster MD   Stopped at 10/09/23 1120     Current Outpatient Medications on File Prior to Encounter   Medication Sig Dispense Refill    baclofen 10 MG Oral Tab Take 1 tablet (10 mg total) by  mouth 3 (three) times daily as needed.      cetirizine 5 MG Oral Tab Take 1 tablet (5 mg total) by mouth daily.      PREDNISONE 20 MG Oral Tab Take 3 tablets (60 mg total) by mouth daily for 5 days, THEN 2 tablets (40 mg total) daily for 5 days, THEN 1 tablet (20 mg total) daily for 5 days. Three tablets daily x 3 days then two tablets daily x 3 days then one tablet x 3 days. 30 tablet 0    HYDROcodone-acetaminophen 5-325 MG Oral Tab Take 1-2 tablets by mouth every 8 (eight) hours as needed for Pain. 10 tablet 0    guaiFENesin-codeine 100-10 MG/5ML Oral Solution Take 5 mL by mouth every 6 (six) hours as needed for cough. 237 mL 1    amoxicillin clavulanate 875-125 MG Oral Tab Take 1 tablet by mouth 2 (two) times daily for 10 days. 20 tablet 0    sucralfate (CARAFATE) 1 g Oral Tab Take 1 tablet (1 g total) by mouth 3 (three) times daily as needed. 60 tablet 3    losartan 50 MG Oral Tab Take 1 tablet (50 mg total) by mouth daily. 90 tablet 2    benzonatate 100 MG Oral Cap Take 1 capsule (100 mg total) by mouth 3 (three) times daily as needed for cough. 30 capsule 0    ipratropium-albuterol 0.5-2.5 (3) MG/3ML Inhalation Solution Take 3 mL by nebulization every 8 (eight) hours. 168 mL 1    sodium chloride, hypertonic, 3 % Inhalation Nebu Soln Take 3 mL by nebulization every 8 (eight) hours. 360 mL 1    Omeprazole 40 MG Oral Capsule Delayed Release Take 1 capsule (40 mg total) by mouth 2 (two) times daily before meals. 90 capsule 3    sertraline 100 MG Oral Tab Take 1 tablet (100 mg total) by mouth daily. 90 tablet 1    Pancrelipase, Lip-Prot-Amyl, (CREON) 15860-19823 units Oral Cap DR Particles Take 2 capsules with meals and 1 capsule with snacks 240 capsule 0    metoprolol succinate ER 50 MG Oral Tablet 24 Hr TAKE 1 TABLET BY MOUTH EVERY DAY 90 tablet 1       Review of Systems:    A comprehensive 10 point review of systems was completed.  Pertinent positives and negatives noted in the the HPI.      Physical  Exam:    General: No acute distress. Alert and oriented x 3.  Vital signs: Temp:  [97.2 °F (36.2 °C)-97.4 °F (36.3 °C)] 97.2 °F (36.2 °C)  Pulse:  [54-62] 62  Resp:  [16-20] 20  BP: (153-181)/(80-98) 164/82  SpO2:  [91 %-97 %] 91 %  HEENT: Moist mucous membranes. Extraocular muscles are intact.  Neck: No swelling, no masses  Respiratory: Non labored, symmetric exursion, + cough  Cardiovascular: No irregularities in rhythm  Abdomen: Soft, + mild tender, nondistended.    Musculoskeletal: Full range of motion of all extremities.  No swelling noted.  Joints: no effusions  Skin: No lesions. No erythema, no open wounds      Laboratory Data:  Laboratory data reviewed      Recent Labs   Lab 12/30/23  0531   RBC 3.44*   HGB 10.4*   HCT 33.5*   MCV 97.4   MCH 30.2   MCHC 31.0   RDW 13.2   NEPRELIM 7.20   WBC 8.7   .0       Recent Labs   Lab 12/26/23  0748 12/29/23  1556 12/30/23  0531   * 112* 111*   BUN 10 13 16   CREATSERUM 0.55* 0.59* 0.53*   CA 9.2 8.7 8.9   ALB 2.8* 2.8* 2.7*    139 139   K 4.0 3.6 3.4*    107 108   CO2 27.0 28.0 27.0   ALKPHO 118* 105 106   AST 22 19 19   ALT 28 26 22   BILT 0.4 0.3 0.3   TP 7.5 7.4 7.2                Microbiologic Data:   No results found for this visit on 12/29/23.      Imaging:  Narrative   PROCEDURE:  CT CHEST+ABDOMEN+PELVIS(ALL CNTRST ONLY)(CPT=71260/96680)     COMPARISON:  [place], CT, CT CHEST+ABDOMEN+PELVIS(ALL CNTRST ONLY)(CPT=71260/55071), 3/20/2023, 8:43 AM.  [facility], CT, CT CHEST+ABDOMEN+PELVIS(ALL CNTRST ONLY)(CPT=71260/76937), 2/28/2023, 7:33 AM.  [facility] , CT, CT CHEST PE AORTA (IV ONLY)  (CPT=71260), 12/16/2023, 9:36 PM.  [facility], XR, XR CHEST AP PORTABLE  (CPT=71045), 12/29/2023, 4:24 PM.     INDICATIONS:  Acute onset of chest and abdominal pain.     TECHNIQUE:  IV contrast-enhanced scanning through the chest, abdomen, and pelvis was performed.  Dose reduction techniques were used. Dose information is transmitted to the ACR (American  College of Radiology) NRDR (National Radiology Data Registry) which   includes the Dose Index Registry.     PATIENT STATED HISTORY:(As transcribed by Technologist)  sudden onset chest/abdominal pain      CONTRAST USED:  80cc of Isovue 370     FINDINGS:       CHEST:    LUNGS:  There are increasing ground-glass opacities throughout the left upper lobe as well as to a lesser degree within the left lower lobe.  This is seen over a background of reticular nodular opacities that are diffusely seen throughout the lower lobes   and right middle lobe.  These opacities also appear to be somewhat increased in severity from the previous study.  MEDIASTINUM:  No mediastinal mass lesions or adenopathy.  Multiple shoddy mediastinal lymph nodes.  Changes of gastric pull-through are stable from prior imaging.  AIDAN:  No mass or adenopathy.    CARDIAC:  Stable changes of prior CABG.  Mild cardiomegaly.  No pericardial effusion.  Extensive atherosclerotic calcifications of the native coronary vessels.  PLEURA:  No mass or effusion.    CHEST WALL:  No mass or axillary adenopathy.    AORTA:  Ectasia of the ascending thoracic aorta measuring 41 mm.  Mild atherosclerotic changes.    VASCULATURE:  No visible pulmonary arterial thrombus or attenuation.       ABDOMEN/PELVIS:  LIVER:  No enlargement, atrophy, abnormal density, or significant focal lesion.    BILIARY:  No visible dilatation or calcification.    PANCREAS:  Diffuse pancreatic atrophy with fatty infiltration.  SPLEEN:  Borderline splenomegaly measuring 13.7 cm.  KIDNEYS:  No mass, obstruction, or calcification.    ADRENALS:  No mass or enlargement.    AORTA:  Mild atherosclerotic calcifications of the aorta and iliac vessels.  No ectasia or aneurysm.  RETROPERITONEUM:  No mass or adenopathy.    BOWEL/MESENTERY:  The stomach, duodenum sweep and small bowel are unremarkable.  Colonic wall thickening along the descending colon and rectosigmoid colon suspicious for colitis.  Mild  scattered diverticular changes without acute diverticulitis.  ABDOMINAL WALL:  No mass or hernia.    URINARY BLADDER:  No visible focal wall thickening, lesion, or calculus.    PELVIC NODES:  No adenopathy.    PELVIC ORGANS:  No visible mass.  Pelvic organs appropriate for patient age.    BONES:  No bony lesion or fracture.                     Impression   CONCLUSION:    1. Relative to the prior exam, there are increasing ground-glass opacities throughout the left upper lobe and to a lesser degree the left lower lobe, superimposed over diffuse reticular nodular opacities, suggestive of a progressive atypical pneumonia.  2. Suggestion of colitis involving the descending and rectosigmoid colon.  3. Please see the body of the report above for further, less significant details.  The preliminary report was reviewed.           Established Problem list:  Patient Active Problem List   Diagnosis    Primary hypertension    Hyperlipidemia with target low density lipoprotein (LDL) cholesterol less than 70 mg/dL    S/P CABG x 4    Nontoxic multinodular goiter    Pulmonary nodules    Thrombophlebitis leg    BRANDAN (generalized anxiety disorder)    Right shoulder Arthroscopy acromioplasty ,distal  clavicle resection, rotator cuff/labral debridment  Global 05/13/2021    Right bicipital tenosynovitis    Malignant neoplasm of esophagus (HCC)    Esophageal anastomotic leak    Esophageal obstruction    On total parenteral nutrition (TPN)    Mechanical complication of esophagostomy (HCC)    Abdominal pain of unknown etiology    Migration of esophageal stent    Gastroparesis    Aspiration pneumonia of both lungs due to gastric secretions (HCC)    Esophageal carcinoma (HCC)    Normocytic anemia    Esophageal fistula    Postnasal drip    Acute cough    Acquired bronchoesophageal fistula (HCC)    Pneumonia of both lower lobes due to infectious organism    Malignant neoplasm of pancreas (HCC)    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    Right-sided  chest pain    Esophageal abnormality    Malignant neoplasm of pancreas, unspecified location of malignancy (HCC)    Migration of esophageal stent, initial encounter    Migrated esophageal stent    Intractable vomiting    Malignant neoplasm of esophagus, unspecified location (HCC)    HCAP (healthcare-associated pneumonia)    Diarrhea, unspecified type       ASSESSMENT:     C diff diarrhea   Symptoms started 12/29   PCR and EIA positive   GI panel negative   Pneumonia   + productive cough on admission  Suspected aspiration   Recent covid 12/10  CT with opacities suggestive of atypical pneumonia  On room air at this time   Strep pneumo ag negative, legionella negative   Sputum cx in process   Recent Streptococcal bacteremia. 1/2 Bcxs + 12/16 with strep gordonii. No fevers. Suspect contaminant. S/p abx EOT 12/27  Gastric adenocarcinoma s/p resection. Imaging without evidence of anastomotic leak.  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     PLAN:   - vancomycin 125mg PO q 6 hrs for C diff  - monitor Bms / abdominal pain for clinical improvement   - continue zosyn for empiric PNA treatment  - start doxy for atypical coverage   - follow up sputum cx and O2 requirements   - VRP   - we will follow along     [facility]AND[facility]  [phone]

## 2023-12-30 NOTE — SLP NOTE
ADULT SWALLOWING EVALUATION    ASSESSMENT    ASSESSMENT/OVERALL IMPRESSION:  Pt is a 54 y/o male admitted with increased cough. Pmhx J tube insertion 9/22 s/p anastomotic leak s/p stent insertion. 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 Pt known to this service 11/29/23 for BSSE with recommendations for regular solids and thin liquids. Pt stated that he has had difficulties swallowing thin liquids, specifically water for the last few months. Pt endorsed weight loss as well. Pt denied globus sensation, reflux, or odynophagia. Pt reported that any fluid he consumes currently \"comes back up\". Pt consumes a regular diet and thin liquids at baseline.     Pt presented with a suspected mild oropharyngeal dysphagia. Coughing present prior to PO trials. Pt participated in trials of regular solids, soft solids, pureed solids, and thin liquids. Bolus acceptance was intact w/o evidence of anterior bolus loss. Mastication and AP transit were thorough and efficient. Hyolaryngeal excursion was intact per palpation. Pt expectorated sputum and demonstrated immediate cough following each trial of thin liquid. No overt s/s of aspiration observed following solid trials.     Recommend pt consume a regular diet and NA liquids d/t increased s/s of aspiration following thin liquid consumption. SLP to reassess to determine liquid consumption. VFSS suggested if pt's condition does not improve or increase in s/s of aspiration are observed during meals. Education provided re: results and recommendations.          RECOMMENDATIONS   Diet Recommendations - Solids: Regular  Diet Recommendations - Liquids: NPO                        Compensatory Strategies Recommended: Small bites and sips;Slow rate  Aspiration Precautions: Upright position;Slow rate;Small bites and sips  Medication Administration Recommendations: Whole in puree  Treatment Plan/Recommendations: SLP to reassess;Aspiration  precautions       HISTORY   MEDICAL HISTORY  Reason for Referral: R/O aspiration    Problem List  Principal Problem:    HCAP (healthcare-associated pneumonia)  Active Problems:    Diarrhea, unspecified type      Past Medical History  Past Medical History:   Diagnosis Date    Back problem     Belching 02/01/2022    Black stools 02/01/2022    Borderline diabetes     Dx in 8/2013 - HgA1C 6.2%    C. difficile diarrhea 10/23/2022    pt treated and without symptoms    Chest pain 02/01/2022    Coronary artery disease     On 8/16/13: CABG x 4 with LIMA to LAD and SVG to diagonal, OM and PDA    Decorative tattoo 01/01/2000    Depression     Esophageal cancer (HCC)     completed chemo    Esophageal reflux     Essential hypertension     Exposure to medical diagnostic radiation     last tx 8/18/2022    Frequent urination 01/01/2013    Gastroparesis     Heartburn 02/01/2022    High blood pressure     High cholesterol 08/01/2013    Found when I had quadruple bypass    History of COVID-19 10/16/2022    asymptomatic - pt was dx during a hospitalization for another diagnosis. No continued symptoms    History of stomach ulcers     Hyperlipidemia     Hyperlipidemia LDL goal < 70     Indigestion 02/01/2022    Morbid obesity with BMI of 40.0-44.9, adult (HCC)     Muscle weakness     Nontoxic multinodular goiter     Dx in 8/2013: pt was told that imaging showed thyroid cysts per PCP    Pancreatic cancer (HCC)     last dose 12/4/2023 is scheduled for another round 12/27/23    Peripheral vascular disease (HCC)     pt denies    Personal history of antineoplastic chemotherapy     for esophageal cancer/completed    Personal history of antineoplastic chemotherapy     pancreatic cancer    Problems with swallowing 02/01/2022    Pulmonary nodules     Dx in 8/2013: CT chest showed small bilateral fissural-based lung nodules less than 1 cm    S/P CABG x 4     On 8/16/13: CABG x 4 with LIMA to LAD and SVG to diagonal, OM and PDA    Shortness of  breath     Vomiting 02/01/2022       Prior Living Situation: Unknown  Diet Prior to Admission: Regular;Thin liquids       Patient/Family Goals: none    SWALLOWING HISTORY  Current Diet Consistency: NPO  Dysphagia History: see above  Imaging Results:   CONCLUSION:    1. Relative to the prior exam, there are increasing ground-glass opacities throughout the left upper lobe and to a lesser degree the left lower lobe, superimposed over diffuse reticular nodular opacities, suggestive of a progressive atypical pneumonia.   2. Suggestion of colitis involving the descending and rectosigmoid colon.   3. Please see the body of the report above for further, less significant details.  The preliminary report was reviewed.            LOCATION:  [facility]            Dictated by (JOSEFINA): Joseph Mcdaniels DO on 12/30/2023 at 9:50 AM       Finalized by (JOSEFINA): Joseph Mcdaniels DO on 12/30/2023 at 9:58 AM       SUBJECTIVE       OBJECTIVE   ORAL MOTOR EXAMINATION  Dentition: Upper partials;Lower partials  Symmetry: Within Functional Limits  Strength: Within Functional Limits  Tone: Within Functional Limits  Range of Motion: Within Functional Limits  Rate of Motion: Within Functional Limits    Voice Quality: Clear     Consistencies Trialed: Thin liquids;Puree;Soft solid;Hard solid  Method of Presentation: Self presentation  Patient Positioning: Upright;Midline    Oral Phase of Swallow: Within Functional Limits                      Pharyngeal Phase of Swallow: Impaired  Laryngeal Elevation Timing: Appears impaired  Laryngeal Elevation Strength: Appears impaired  Laryngeal Elevation Coordination: Appears impaired  (Please note: Silent aspiration cannot be evaluated clinically. Videofluoroscopic Swallow Study is required to rule-out silent aspiration.)    Esophageal Phase of Swallow: No complaints consistent with possible esophageal involvement  Comments: none              GOALS  Goal #1 The patient will tolerate regular consistency and NA liquids  without overt signs or symptoms of aspiration with 90 % accuracy over 1-2 session(s).  In Progress   Goal #2 The patient/family/caregiver will demonstrate understanding and implementation of aspiration precautions and swallow strategies independently over 1-2 session(s).    In Progress   Goal #3 SLP to reassess.   In Progress   FOLLOW UP  Treatment Plan/Recommendations: SLP to reassess;Aspiration precautions  Number of Visits to Meet Established Goals:  (2-3)  Follow Up Needed (Documentation Required): Yes  SLP Follow-up Date: 12/31/23    Thank you for your referral.   If you have any questions, please contact Sydnee CORDON, SLP

## 2023-12-30 NOTE — ED QUICK NOTES
Orders for admission, patient is aware of plan and ready to go upstairs. Any questions, please call ED RN Mone at extension [phone].     Patient Covid vaccination status: Fully vaccinated and immunocompromised     COVID Test Ordered in ED: None    COVID Suspicion at Admission: N/A    Running Infusions:  None    Mental Status/LOC at time of transport: a&ox4    Other pertinent information:   CIWA score: N/A   NIH score:  N/A

## 2023-12-30 NOTE — PROGRESS NOTES
[facility]     Hospitalist Progress Note     Dontae Buddy Ortegacristina Champion Patient Status:  Inpatient    10/15/1970 MRN [id number]   [facility] 4NW-A Attending Jose Roberto Myles DO   Hosp Day # 1 PCP Adrian Horowitz MD     Chief Complaint: Malaise, chills    Subjective:     Patient reports subjective cough. No fever. Wants to eat.    Objective:    Review of Systems:   A comprehensive review of systems was completed; pertinent positive and negatives stated in subjective.    Vital signs:  Temp:  [97.2 °F (36.2 °C)-98.9 °F (37.2 °C)] 97.2 °F (36.2 °C)  Pulse:  [54-85] 62  Resp:  [15-20] 20  BP: (153-181)/() 164/82  SpO2:  [91 %-97 %] 91 %    Physical Exam:    General: No acute distress, awake and alert  Respiratory: Diminished bilaterally  Cardiovascular: S1, S2, regular rate and rhythm  Abdomen: Soft, Non-tender, non-distended, positive bowel sounds  Extremities: No edema    Diagnostic Data:    Labs:  Recent Labs   Lab 23  0748 23  1555 23  0531   WBC 13.8* 9.9 8.7   HGB 11.1* 10.9* 10.4*   MCV 97.5 96.8 97.4   .0 278.0 244.0     Recent Labs   Lab 23  0748 23  1556 23  0531   * 112* 111*   BUN 10 13 16   CREATSERUM 0.55* 0.59* 0.53*   CA 9.2 8.7 8.9   ALB 2.8* 2.8* 2.7*    139 139   K 4.0 3.6 3.4*    107 108   CO2 27.0 28.0 27.0   ALKPHO 118* 105 106   AST 22 19 19   ALT 28 26 22   BILT 0.4 0.3 0.3   TP 7.5 7.4 7.2     Estimated Creatinine Clearance: 183.9 mL/min (A) (based on SCr of 0.53 mg/dL (L)).    No results for input(s): \"TROP\", \"TROPHS\", \"CK\" in the last 168 hours.    No results for input(s): \"PTP\", \"INR\" in the last 168 hours.     Microbiology  No results found for this visit on 23.    Imaging: Reviewed in Epic.    Medications:    vancomycin  125 mg Oral Q6H    ipratropium-albuterol  3 mL Nebulization 6 times per day    potassium chloride  40 mEq Intravenous Once    enoxaparin  40 mg Subcutaneous Daily    pantoprazole  40 mg  Intravenous Q12H    fluticasone furoate-vilanterol  1 puff Inhalation Daily    piperacillin-tazobactam  3.375 g Intravenous Q8H    fluticasone propionate  1 spray Each Nare Daily    cetirizine  5 mg Oral Daily    losartan  50 mg Oral Daily    metoprolol succinate ER  50 mg Oral Daily Beta Blocker    sertraline  100 mg Oral Daily    sodium chloride (hypertonic)  3 mL Nebulization Q8H       Assessment & Plan:      #Suspected aspiration PNA  #Dyspnea on exertion with cough  #Recent COVID-19 infection,  -CT with GGO in [facility] and [facility]  -IV Abx  -Follow blood and sputum cultures  -Nebs  -Pt was Pulm clinic when he was directed to ER for further evaluation  -Pulm and ID consult   -Echo in October with preserved LVEF and grade 2 DD  -SLP eval    #C diff colitis, recurrent  -PO vancomycin  -ID consult    #COVID 12/10/23 with persistent cough/ bronchitis  -Possibly CT changes could be related to COVID pneumonitis  -Continue breo    #Metastatic esophageal adenocarcinoma s/p gastroesophagostomy 9/2022 c/b post op anastomotic leak   #Pancreatic mass 4/23- s/p stent insertion that subsequently migrated and stent was removed 12/12/23  -GI consulted from ER, discussed with Dr. Wing. Will hold consult and re-eval tomorrow if patient still having symptoms. Patient typically sees Dr. Ritchie and he is not in town  -PPI and pancrealipase     #H/o CAD s/p CABG  #Hypertension  #HLD  -Continue home meds, PRN hydralazine     #Recent alpha hemolytic strep bacteremia 12/2023  -Completed PO augmentin 12/27/23  -Follow repeat cultures    #Depression  -Sertraline      Jose Roberto Myles DO    Supplementary Documentation:     Quality:  DVT Mechanical Prophylaxis:   SCDs,    DVT Pharmacologic Prophylaxis   Medication    enoxaparin (Lovenox) 40 MG/0.4ML SUBQ injection 40 mg    heparin (Porcine) 100 Units/mL lock flush 500 Units     Code Status: Full Code  Neumann: No urinary catheter in place  SHUBHAM: TBD    Discharge is dependent on: Clinical state,  consultant recs  At this point Mr. Cortez is expected to be discharge to: Home    The 21st Century Cures Act makes medical notes like these available to patients in the interest of transparency. Please be advised this is a medical document. Medical documents are intended to carry relevant information, facts as evident, and the clinical opinion of the practitioner. The medical note is intended as peer to peer communication and may appear blunt or direct. It is written in medical language and may contain abbreviations or verbiage that are unfamiliar.

## 2023-12-30 NOTE — PLAN OF CARE
Patient is alert and orientated, VSS, RA, afebrile and has 10/10 pain relieved with IV dilaudid. Patient had swallow study completed and is clear for regular diet with no liquids, ice chip are ok and will take sips of water with pills. Patient postive for Cdif and is getting IV zoysn. Sputum sample was taken and sent to lab. All meds given per MAR. IV 0.9 NS running at 100 mL/hr. Respiratory to swab for flu panel. Call light and safety precautions in place.    Problem: RESPIRATORY - ADULT  Goal: Achieves optimal ventilation and oxygenation  Description: INTERVENTIONS:  - Assess for changes in respiratory status  - Assess for changes in mentation and behavior  - Position to facilitate oxygenation and minimize respiratory effort  - Oxygen supplementation based on oxygen saturation or ABGs  - Provide Smoking Cessation handout, if applicable  - Encourage broncho-pulmonary hygiene including cough, deep breathe, Incentive Spirometry  - Assess the need for suctioning and perform as needed  - Assess and instruct to report SOB or any respiratory difficulty  - Respiratory Therapy support as indicated  - Manage/alleviate anxiety  - Monitor for signs/symptoms of CO2 retention  Outcome: Progressing     Problem: GASTROINTESTINAL - ADULT  Goal: Maintains or returns to baseline bowel function  Description: INTERVENTIONS:  - Assess bowel function  - Maintain adequate hydration with IV or PO as ordered and tolerated  - Evaluate effectiveness of GI medications  - Encourage mobilization and activity  - Obtain nutritional consult as needed  - Establish a toileting routine/schedule  - Consider collaborating with pharmacy to review patient's medication profile  Outcome: Progressing  Goal: Maintains adequate nutritional intake (undernourished)  Description: INTERVENTIONS:  - Monitor percentage of each meal consumed  - Identify factors contributing to decreased intake, treat as appropriate  - Assist with meals as needed  - Monitor I&O, WT  and lab values  - Obtain nutritional consult as needed  - Optimize oral hygiene and moisture  - Encourage food from home; allow for food preferences  - Enhance eating environment  Outcome: Progressing     Problem: METABOLIC/FLUID AND ELECTROLYTES - ADULT  Goal: Glucose maintained within prescribed range  Description: INTERVENTIONS:  - Monitor Blood Glucose as ordered  - Assess for signs and symptoms of hyperglycemia and hypoglycemia  - Administer ordered medications to maintain glucose within target range  - Assess barriers to adequate nutritional intake and initiate nutrition consult as needed  - Instruct patient on self management of diabetes  Outcome: Progressing  Goal: Electrolytes maintained within normal limits  Description: INTERVENTIONS:  - Monitor labs and rhythm and assess patient for signs and symptoms of electrolyte imbalances  - Administer electrolyte replacement as ordered  - Monitor response to electrolyte replacements, including rhythm and repeat lab results as appropriate  - Fluid restriction as ordered  - Instruct patient on fluid and nutrition restrictions as appropriate  Outcome: Progressing  Goal: Hemodynamic stability and optimal renal function maintained  Description: INTERVENTIONS:  - Monitor labs and assess for signs and symptoms of volume excess or deficit  - Monitor intake, output and patient weight  - Monitor urine specific gravity, serum osmolarity and serum sodium as indicated or ordered  - Monitor response to interventions for patient's volume status, including labs, urine output, blood pressure (other measures as available)  - Encourage oral intake as appropriate  - Instruct patient on fluid and nutrition restrictions as appropriate  Outcome: Progressing

## 2023-12-30 NOTE — H&P
[facility]     Dontae Cortez Jr. Patient Status:  Inpatient    10/15/1970 MRN [id number]   Location [facility] 4NW-A Attending Sakina Hernandez MD   Hosp Day # 0 PCP Adrian Horowitz MD     Chief Complaint:     Subjective:    History of Present Illness:     Dontae Cortez Jr. 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  Pancreatic head mass 2023 c/p fistual s/p stent insertion which subsequently migrated and was removed 23 via GI. The patient presents to the hospital  feeling unwell shortly after his recent Discharge He notes he was doing well for 1-2 days then began to have worsening cough with yellow/black phlegm. He has had chills, but no documented fevers. He was DC on PO Augmentin for bacteremia - completed 23. Pt reporting post nasal drainage, inability to swallow LIQUIDS only- last had something to drink 5 am on - but is tolerating solids . He notes he has pain in his Right side of gabby / abdomen which is new for him. No N, no V. Pt also started to develop diarrhea today- loost stools, pale colored.     History/Other:    Past Medical History:  Past Medical History:   Diagnosis Date    Belching 2022    Black stools 2022    Borderline diabetes     Dx in 2013 - HgA1C 6.2%    C. difficile diarrhea 10/23/2022    pt treated and without symptoms    Chest pain 2022    Coronary artery disease     On 13: CABG x 4 with LIMA to LAD and SVG to diagonal, OM and PDA    Decorative tattoo 2000    Depression     Esophageal cancer (HCC)     completed chemo    Essential hypertension     Exposure to medical diagnostic radiation     last tx 2022    Frequent urination 2013    Gastroparesis     Heartburn 2022    High blood pressure     High cholesterol 2013     Found when I had quadruple bypass    History of COVID-19 10/16/2022    asymptomatic - pt was dx during a hospitalization for another diagnosis. No continued symptoms    History of stomach ulcers     Hyperlipidemia     Hyperlipidemia LDL goal < 70     Indigestion 02/01/2022    Morbid obesity with BMI of 40.0-44.9, adult (HCC)     Nontoxic multinodular goiter     Dx in 8/2013: pt was told that imaging showed thyroid cysts per PCP    Pancreatic cancer (HCC)     last dose 12/4/2023 is scheduled for another round 12/27/23    Peripheral vascular disease (HCC)     pt denies    Personal history of antineoplastic chemotherapy     for esophageal cancer/completed    Personal history of antineoplastic chemotherapy     pancreatic cancer    Problems with swallowing 02/01/2022    Pulmonary nodules     Dx in 8/2013: CT chest showed small bilateral fissural-based lung nodules less than 1 cm    S/P CABG x 4     On 8/16/13: CABG x 4 with LIMA to LAD and SVG to diagonal, OM and PDA    Shortness of breath     Vomiting 02/01/2022     Past Surgical History:   Past Surgical History:   Procedure Laterality Date    APPENDECTOMY      APPENDECTOMY      ARTHROSCOPY OF JOINT UNLISTED      right shoulder    CABG      On 8/16/13: CABG x 4 with LIMA to LAD and SVG to diagonal, OM and PDA    CARDIAC CATH LAB      On 8/14/2013: cardiac cath showed 3-vessel disease    OTHER SURGICAL HISTORY      1.       Laparoscopic robotic-assisted esophagogastrectomy.      Family History:   Family History   Problem Relation Age of Onset    Cancer Mother         breast and colon     Diabetes Neg      Social History:    reports that he quit smoking about 10 years ago. His smoking use included cigarettes. He has never used smokeless tobacco. He reports that he does not currently use alcohol. He reports that he does not use drugs.     Allergies: No Known Allergies    Medications:    Current Facility-Administered Medications on File Prior to Encounter   Medication  Dose Route Frequency Provider Last Rate Last Admin    [COMPLETED] trastuzumab-qyyp (Trazimera) 504 mg in sodium chloride 0.9% 274 mL infusion  6 mg/kg (Treatment Plan Recorded) Intravenous Once Nataly Siddiqui APRN   Stopped at 23 0930    [COMPLETED] sodium chloride 0.9 % IV bolus 500 mL  500 mL Intravenous Once Freddy Hernandez MD   Stopped at 23    [COMPLETED] ipratropium-albuterol (Duoneb) 0.5-2.5 (3) MG/3ML inhalation solution 3 mL  3 mL Nebulization Once Freddy Hernandez MD   3 mL at 23    [COMPLETED] HYDROmorphone (Dilaudid) 1 MG/ML injection 0.5 mg  0.5 mg Intravenous Q30 Min PRN Freddy Hernandez MD   0.5 mg at 23    [COMPLETED] iohexol (Omnipaque) 350 MG/ML injection via power injector 100 mL  100 mL Intravenous ONCE PRN Freddy Hernandez MD   100 mL at 23 214    [COMPLETED] morphINE PF 4 MG/ML injection 4 mg  4 mg Intravenous Q30 Min PRN Wero Chiang MD   4 mg at 23 0813    [COMPLETED] magnesium sulfate in sterile water for injection 2 g/50mL IVPB premix 2 g  2 g Intravenous Once Megan Arshad MD 50 mL/hr at 23 1413 2 g at 23 1413    [] sodium chloride 0.9% infusion   Intravenous Continuous Nikia Fairchild MD        [] HYDROmorphone (Dilaudid) 1 MG/ML injection 1 mg  1 mg Intravenous Q30 Min PRN Nikia Fairchild MD        [] ondansetron (Zofran) 4 MG/2ML injection 4 mg  4 mg Intravenous Q4H PRN Nikia Fairchild MD        [COMPLETED] iopamidol 76% (ISOVUE-370) injection for power injector  100 mL Intravenous ONCE PRN Nikia Fairchild MD   100 mL at 12/10/23 2109    [COMPLETED] hydrALAzine (Apresoline) 20 mg/mL injection 10 mg  10 mg Intravenous Once Nikia Fairchild MD   10 mg at 12/10/23 2231    [COMPLETED] pembrolizumab (Keytruda) 400 mg in sodium chloride 0.9% 116 mL IVPB  400 mg Intravenous Once Grecia Angelo APRN   Stopped at 23 1243    [COMPLETED] trastuzumab-qyyp (Trazimera) 504 mg in sodium chloride 0.9%  274 mL infusion  6 mg/kg (Treatment Plan Recorded) Intravenous Once Grecia Angelo APRN   Stopped at 12/04/23 1319    [COMPLETED] magnesium sulfate in sterile water for injection 2 g/50mL IVPB premix 2 g  2 g Intravenous Once Glen Myles MD 50 mL/hr at 11/27/23 2011 2 g at 11/27/23 2011    [COMPLETED] morphINE PF 4 MG/ML injection 4 mg  4 mg Intravenous Once Dede Jacques MD   4 mg at 11/26/23 1125    [COMPLETED] morphINE PF 2 MG/ML injection 2 mg  2 mg Intravenous Once Dede Jacques MD   2 mg at 11/26/23 1337    [COMPLETED] iopamidol 76% (ISOVUE-370) injection for power injector  75 mL Intravenous ONCE PRN Dede Jacques MD   75 mL at 11/26/23 1347    [COMPLETED] piperacillin-tazobactam (Zosyn) 4.5 g in dextrose 5% 100 mL IVPB-ADDV  4.5 g Intravenous Once Dede Jacques  mL/hr at 11/26/23 1517 4.5 g at 11/26/23 1517    [COMPLETED] vancomycin (Vancocin) 1.25 g in sodium chloride 0.9% 250mL IVPB premix  15 mg/kg Intravenous Once Glen Myles .7 mL/hr at 11/26/23 1758 1,250 mg at 11/26/23 1758    [COMPLETED] morphINE PF 2 MG/ML injection 2 mg  2 mg Intravenous Once Dede Jacques MD   2 mg at 11/26/23 1528    [COMPLETED] influenza vaccine split quad (Fluzone QIV) 0.5 mL IM injection (ages 6 months to 64 years) 0.5 mL  0.5 mL Intramuscular Prior to discharge Eitan Landeros MD   0.5 mL at 11/11/23 1240    [COMPLETED] potassium chloride 20 mEq/100mL IVPB premix 20 mEq  20 mEq Intravenous Once Eitan Landeros MD   Stopped at 11/10/23 1751    [COMPLETED] magnesium sulfate in sterile water for injection 2 g/50mL IVPB premix 2 g  2 g Intravenous Once Eitan Landeros MD   Stopped at 11/10/23 1751    [COMPLETED] iron sucrose (Venofer) 20 mg/mL injection 200 mg  200 mg Intravenous Daily Senia Foster MD   200 mg at 11/10/23 9594    [COMPLETED] magnesium sulfate in sterile water for injection 2 g/50mL IVPB premix 2 g  2 g Intravenous Once Eitan Landeros MD 50 mL/hr at  11/08/23 0834 2 g at 11/08/23 0834    [COMPLETED] iopamidol 76% (ISOVUE-370) injection for power injector  80 mL Intravenous ONCE PRN Eitan Landeros MD   80 mL at 11/08/23 1612    [COMPLETED] morphINE PF 4 MG/ML injection 4 mg  4 mg Intravenous Once Freddy Duff MD   4 mg at 11/06/23 1519    [COMPLETED] ondansetron (Zofran) 4 MG/2ML injection 4 mg  4 mg Intravenous Once Freddy Duff MD   4 mg at 11/06/23 1519    [COMPLETED] trastuzumab-qyyp (Trazimera) 588 mg in sodium chloride 0.9% 278 mL infusion  6 mg/kg (Treatment Plan Recorded) Intravenous Once Senia Foster MD   Stopped at 10/31/23 1442    [COMPLETED] pembrolizumab (Keytruda) 400 mg in sodium chloride 0.9% 116 mL IVPB  400 mg Intravenous Once Senia Foster MD   Stopped at 10/09/23 1040    [COMPLETED] trastuzumab-qyyp (Trazimera) 588 mg in sodium chloride 0.9% 278 mL infusion  6 mg/kg (Treatment Plan Recorded) Intravenous Once Senia Foster MD   Stopped at 10/09/23 1120     Current Outpatient Medications on File Prior to Encounter   Medication Sig Dispense Refill    baclofen 10 MG Oral Tab Take 1 tablet (10 mg total) by mouth 3 (three) times daily as needed.      PREDNISONE 20 MG Oral Tab Take 3 tablets (60 mg total) by mouth daily for 5 days, THEN 2 tablets (40 mg total) daily for 5 days, THEN 1 tablet (20 mg total) daily for 5 days. Three tablets daily x 3 days then two tablets daily x 3 days then one tablet x 3 days. 30 tablet 0    HYDROcodone-acetaminophen 5-325 MG Oral Tab Take 1-2 tablets by mouth every 8 (eight) hours as needed for Pain. 10 tablet 0    guaiFENesin-codeine 100-10 MG/5ML Oral Solution Take 5 mL by mouth every 6 (six) hours as needed for cough. 237 mL 1    amoxicillin clavulanate 875-125 MG Oral Tab Take 1 tablet by mouth 2 (two) times daily for 10 days. 20 tablet 0    sucralfate (CARAFATE) 1 g Oral Tab Take 1 tablet (1 g total) by mouth 3 (three) times daily as needed. 60 tablet 3    losartan 50 MG Oral Tab Take 1  tablet (50 mg total) by mouth daily. 90 tablet 2    benzonatate 100 MG Oral Cap Take 1 capsule (100 mg total) by mouth 3 (three) times daily as needed for cough. 30 capsule 0    ipratropium-albuterol 0.5-2.5 (3) MG/3ML Inhalation Solution Take 3 mL by nebulization every 8 (eight) hours. 168 mL 1    sodium chloride, hypertonic, 3 % Inhalation Nebu Soln Take 3 mL by nebulization every 8 (eight) hours. 360 mL 1    Omeprazole 40 MG Oral Capsule Delayed Release Take 1 capsule (40 mg total) by mouth 2 (two) times daily before meals. 90 capsule 3    sertraline 100 MG Oral Tab Take 1 tablet (100 mg total) by mouth daily. 90 tablet 1    Pancrelipase, Lip-Prot-Amyl, (CREON) 19650-28037 units Oral Cap DR Particles Take 2 capsules with meals and 1 capsule with snacks 240 capsule 0    metoprolol succinate ER 50 MG Oral Tablet 24 Hr TAKE 1 TABLET BY MOUTH EVERY DAY 90 tablet 1       Review of Systems:   A comprehensive review of systems was completed.    Pertinent positives and negatives noted in the HPI.    Objective:   Physical Exam:    BP (!) 174/98   Pulse 54   Temp 98.9 °F (37.2 °C) (Oral)   Resp 16   Ht 6' 2\" (1.88 m)   Wt 180 lb (81.6 kg)   SpO2 96%   BMI 23.11 kg/m²   General: No acute distress, Alert  Respiratory: No rhonchi, no wheezes  Cardiovascular: S1, S2. Regular rate and rhythm  Abdomen: Soft, Non-tender, non-distended, positive bowel sounds  Neuro: No new focal deficits  Extremities: No edema      Results:    Labs:      Labs Last 24 Hours:    Recent Labs   Lab 12/26/23  0748 12/29/23  1555   RBC 3.59* 3.49*   HGB 11.1* 10.9*   HCT 35.0* 33.8*   MCV 97.5 96.8   MCH 30.9 31.2   MCHC 31.7 32.2   RDW 13.6 13.7   NEPRELIM 12.19* 8.34*   WBC 13.8* 9.9   .0 278.0       Recent Labs   Lab 12/26/23  0748 12/29/23  1556   * 112*   BUN 10 13   CREATSERUM 0.55* 0.59*   EGFRCR 118 115   CA 9.2 8.7   ALB 2.8* 2.8*    139   K 4.0 3.6    107   CO2 27.0 28.0   ALKPHO 118* 105   AST 22 19   ALT 28  26   BILT 0.4 0.3   TP 7.5 7.4       Lab Results   Component Value Date    PT 20.4 (H) 01/30/2014    PT 22.9 (H) 01/13/2014    PT 25.7 (H) 01/08/2014    INR 1.20 11/27/2023    INR 1.08 01/26/2023    INR 1.1 06/20/2022       No results for input(s): \"TROP\", \"TROPHS\", \"CK\" in the last 168 hours.    No results for input(s): \"TROP\", \"PBNP\" in the last 168 hours.    No results for input(s): \"PCT\" in the last 168 hours.    Imaging: Imaging data reviewed in Epic.    Assessment & Plan:      # suspected aspiration PNA  IV Abx  Pt was Pulm clinic when he was directed to ER for further evaluation  Pulm consult   #Diarrhea  H/o c Diff, C Diff sample pending   #COVID 12/10/23 with persistent cough/ bronchitis  Breo  #esophageal ca c/b post op anastomotic leak   # Pancreatic mass 4/23- s/ stent insertion that subsequently migrated and stent was removed 12/12/23  GI on Cs  #h/o CAD s/p CABG  #Hypertension  Continue home med,s PRN hydralazine   #HLD   # Recent alpha hemolytic strep bacteremia 12/2023- completed PO augmentin 12/27/23  Blood CX repeated in ER  # h/o C Diff, if c diff testing neg will need Ppx     Plan of care discussed with pt, RN    Sophia Gibson MD    Supplementary Documentation:     The 21st Century Cures Act makes medical notes like these available to patients in the interest of transparency. Please be advised this is a medical document. Medical documents are intended to carry relevant information, facts as evident, and the clinical opinion of the practitioner. The medical note is intended as peer to peer communication and may appear blunt or direct. It is written in medical language and may contain abbreviations or verbiage that are unfamiliar.

## 2023-12-30 NOTE — PLAN OF CARE
Problem: RESPIRATORY - ADULT  Goal: Achieves optimal ventilation and oxygenation  Description: INTERVENTIONS:  - Assess for changes in respiratory status  - Assess for changes in mentation and behavior  - Position to facilitate oxygenation and minimize respiratory effort  - Oxygen supplementation based on oxygen saturation or ABGs  - Provide Smoking Cessation handout, if applicable  - Encourage broncho-pulmonary hygiene including cough, deep breathe, Incentive Spirometry  - Assess the need for suctioning and perform as needed  - Assess and instruct to report SOB or any respiratory difficulty  - Respiratory Therapy support as indicated  - Manage/alleviate anxiety  - Monitor for signs/symptoms of CO2 retention  Outcome: Progressing     Problem: GASTROINTESTINAL - ADULT  Goal: Maintains or returns to baseline bowel function  Description: INTERVENTIONS:  - Assess bowel function  - Maintain adequate hydration with IV or PO as ordered and tolerated  - Evaluate effectiveness of GI medications  - Encourage mobilization and activity  - Obtain nutritional consult as needed  - Establish a toileting routine/schedule  - Consider collaborating with pharmacy to review patient's medication profile  Outcome: Progressing  Goal: Maintains adequate nutritional intake (undernourished)  Description: INTERVENTIONS:  - Monitor percentage of each meal consumed  - Identify factors contributing to decreased intake, treat as appropriate  - Assist with meals as needed  - Monitor I&O, WT and lab values  - Obtain nutritional consult as needed  - Optimize oral hygiene and moisture  - Encourage food from home; allow for food preferences  - Enhance eating environment  Outcome: Progressing     Problem: METABOLIC/FLUID AND ELECTROLYTES - ADULT  Goal: Glucose maintained within prescribed range  Description: INTERVENTIONS:  - Monitor Blood Glucose as ordered  - Assess for signs and symptoms of hyperglycemia and hypoglycemia  - Administer ordered  medications to maintain glucose within target range  - Assess barriers to adequate nutritional intake and initiate nutrition consult as needed  - Instruct patient on self management of diabetes  Outcome: Progressing  Goal: Electrolytes maintained within normal limits  Description: INTERVENTIONS:  - Monitor labs and rhythm and assess patient for signs and symptoms of electrolyte imbalances  - Administer electrolyte replacement as ordered  - Monitor response to electrolyte replacements, including rhythm and repeat lab results as appropriate  - Fluid restriction as ordered  - Instruct patient on fluid and nutrition restrictions as appropriate  Outcome: Progressing  Goal: Hemodynamic stability and optimal renal function maintained  Description: INTERVENTIONS:  - Monitor labs and assess for signs and symptoms of volume excess or deficit  - Monitor intake, output and patient weight  - Monitor urine specific gravity, serum osmolarity and serum sodium as indicated or ordered  - Monitor response to interventions for patient's volume status, including labs, urine output, blood pressure (other measures as available)  - Encourage oral intake as appropriate  - Instruct patient on fluid and nutrition restrictions as appropriate  Outcome: Progressing   Came from home with diarrhea, weakness, r sided chest pain, coughing/choking.  Has a history of esophageal/pancreatic cancer.  Sats on room air above 95%.  No difficulty talking, no shortness of breath.  Pt says he can tolerate solid foods but liquids make him cough and he wakes during the nite coughing on his sputum.  Pain to right shoulder not relieved with morphine.  Meds changed to dilaudid and stat ct of chest/abdomen done.  Vital signs are stable.  Afebrile.  Stools samples sent and MRSA swab sent.  IVF infusing per PAC.      0700-call from lab.  Positive for cdif.  Notified hospitalist.  Started po vanco.  GI and pulmonalry consults called.

## 2023-12-31 PROBLEM — A04.72 C. DIFFICILE COLITIS: Status: ACTIVE | Noted: 2023-12-31

## 2023-12-31 PROBLEM — A04.72 C. DIFFICILE COLITIS: Status: ACTIVE | Noted: 2023-01-01

## 2023-12-31 LAB
POTASSIUM SERPL-SCNC: 3.4 MMOL/L (ref 3.5–5.1)
PROCALCITONIN SERPL-MCNC: 0.23 NG/ML (ref ?–0.16)

## 2023-12-31 PROCEDURE — 99232 SBSQ HOSP IP/OBS MODERATE 35: CPT | Performed by: INTERNAL MEDICINE

## 2023-12-31 NOTE — PLAN OF CARE
Patient is alert and oriented x 4. On contact plus precautions for recurrent C-diff infection. Port a cath to L. Upper chest for IV fluids/medications. Patient infusing 0.9 NS at 100 ml's/hour. Patient has q2h dilaudid prn for pain management. PRN zofran for nausea also given this shift. Patient on a regular diet with honey thickened liquids per speech. Medications given per mar. Ambulating in the room ad tito. Continent of bowel and bladder LBM: 12/31/23. Call light within reach.

## 2023-12-31 NOTE — SLP NOTE
SPEECH DAILY NOTE - INPATIENT    ASSESSMENT & PLAN   ASSESSMENT  The patient was seen for dysphagia treatment in room.  The patient is tolerating regular solids.  Recommendation per bedside swallow from 12/30/23 was NPO for liquids.     The patient demonstrates adequate bolus containment and formation with liquids and solids.  Swallow response is suspected to be mistimed.  Noted immediate cough response with thin liquids.  Delayed cough response noted with nectar thick liquids.  No overt signs of aspiration with honey thick liquids in small sips.  Cough is productive.      Recommend videofluoroscopic swallow study to further assess oropharyngeal swallow function and to inform dysphagia treatment plan.    Diet Recommendations - Solids: Regular  Diet Recommendations - Liquids: Honey thick liquids/ Moderately thick    Compensatory Strategies Recommended: No straws;Slow rate;Alternate consistencies;Small bites and sips;Extra sauce/gravy  Aspiration Precautions: Upright position;Slow rate;Small bites and sips  Medication Administration Recommendations: Whole in puree    Discussed recommendations and plan of care with DANY Gill.     Patient Experiencing Pain: Yes  Pain Rating: 10  Pain Location: right flank  Pain Control: PO meds  Nursing Aware of Pain?: Yes    Discharge Recommendations  Discharge Recommendations/Plan: Acute rehabilitation    Treatment Plan  Treatment Plan/Recommendations: Videofluoroscopic swallow study    Interdisciplinary Communication: Discussed with RN               GOALS  Goal #1 The patient will tolerate regular consistency and honey thick liquids without overt signs or symptoms of aspiration with 100% accuracy over 3 session(s).  Goal updated   Goal #2 The patient/family/caregiver will demonstrate understanding and implementation of aspiration precautions and swallow strategies independently over 1-2 session(s).  In Progress   Goal #3 The patient will participate in VFSS to further assess  oropharyngeal swallow function and to inform dysphagia treatment plan.   In Progress     FOLLOW UP  Follow Up Needed (Documentation Required): Yes  SLP Follow-up Date: 01/01/24  Number of Visits to Meet Established Goals: 5    Session: 2     If you have any questions, please contact DAKSHA Craft MS CCC-SLP  Speech Language Pathologist  Office phone: [phone]  Pager: [phone], #[phone]

## 2023-12-31 NOTE — PROGRESS NOTES
[facility]     Hospitalist Progress Note     Dontae Buddy Diego Champion Patient Status:  Inpatient    10/15/1970 MRN [id number]   Location [facility] 4NW-A Attending Jose Roberto Myles,    Hosp Day # 2 PCP Adrian Horowitz MD     Chief Complaint: Malaise, chills    Subjective:     Patient reports subjective cough, has some shortness of breath.  Low-grade fever 99 °F.  Had diarrhea multiple episodes according to patient, has C. difficile positive which came back yesterday, on oral vancomycin    Objective:    Review of Systems:   A comprehensive review of systems was completed; pertinent positive and negatives stated in subjective.    Vital signs:  Temp:  [97.7 °F (36.5 °C)-99 °F (37.2 °C)] 98.7 °F (37.1 °C)  Pulse:  [67-82] 67  Resp:  [14-18] 18  BP: (128-152)/(63-72) 130/63  SpO2:  [90 %-96 %] 96 %    Physical Exam:   /63 (BP Location: Left arm)   Pulse 67   Temp 98.7 °F (37.1 °C) (Oral)   Resp 18   Ht 6' 2\" (1.88 m)   Wt 180 lb (81.6 kg)   SpO2 96%   BMI 23.11 kg/m²      General: No acute distress, awake and alert  Respiratory: Diminished bilaterally  Cardiovascular: S1, S2, regular rate and rhythm  CNS: Awake alert nonfocal  Abdomen: Soft, Non-tender, non-distended, positive bowel sounds  Extremities: No pedal edema    Diagnostic Data:    Labs:  Recent Labs   Lab 23  0748 23  1555 23  0531   WBC 13.8* 9.9 8.7   HGB 11.1* 10.9* 10.4*   MCV 97.5 96.8 97.4   .0 278.0 244.0     Recent Labs   Lab 23  0748 23  1556 23  0531 23  0510   * 112* 111*  --    BUN 10 13 16  --    CREATSERUM 0.55* 0.59* 0.53*  --    CA 9.2 8.7 8.9  --    ALB 2.8* 2.8* 2.7*  --     139 139  --    K 4.0 3.6 3.4* 3.4*    107 108  --    CO2 27.0 28.0 27.0  --    ALKPHO 118* 105 106  --    AST 22 19 19  --    ALT 28 26 22  --    BILT 0.4 0.3 0.3  --    TP 7.5 7.4 7.2  --      Estimated Creatinine Clearance: 183.9 mL/min (A) (based on SCr of 0.53 mg/dL (L)).    No  results for input(s): \"TROP\", \"TROPHS\", \"CK\" in the last 168 hours.    No results for input(s): \"PTP\", \"INR\" in the last 168 hours.     Microbiology  Hospital Encounter on 12/29/23   1. Blood Culture     Status: None (Preliminary result)    Collection Time: 12/29/23  3:56 PM    Specimen: Blood,peripheral   Result Value Ref Range    Blood Culture Result No Growth 1 Day N/A       Imaging: Reviewed in Epic.    Medications:    vancomycin  125 mg Oral Q6H    ipratropium-albuterol  3 mL Nebulization 6 times per day    lipase-protease-amylase (Lip-Prot-Amyl)  25,000 Units Oral TID CC    doxycycline  100 mg Oral 2 times per day    enoxaparin  40 mg Subcutaneous Daily    pantoprazole  40 mg Intravenous Q12H    fluticasone furoate-vilanterol  1 puff Inhalation Daily    piperacillin-tazobactam  3.375 g Intravenous Q8H    fluticasone propionate  1 spray Each Nare Daily    cetirizine  5 mg Oral Daily    losartan  50 mg Oral Daily    metoprolol succinate ER  50 mg Oral Daily Beta Blocker    sertraline  100 mg Oral Daily    sodium chloride (hypertonic)  3 mL Nebulization Q8H       Assessment & Plan:      #Suspected aspiration PNA  #Dyspnea on exertion with cough  #Recent COVID-19 infection,  -CT with GGO in [place] and [place]  -IV Abx  -Follow blood and sputum cultures  -Nebs  -Pt was Pulm clinic when he was directed to ER for further evaluation  -Pulm and ID consult   -Echo in October with preserved LVEF and grade 2 DD  -SLP eval    #C diff colitis, recurrent with diarrhea  -PO vancomycin 125 mg every 6 hours  -ID consult appreciated    #COVID 12/10/23 with persistent cough/ bronchitis  -Possibly CT changes could be related to COVID pneumonitis  -Continue breo    #Metastatic esophageal adenocarcinoma s/p gastroesophagostomy 9/2022 c/b post op anastomotic leak   #Pancreatic mass 4/23- s/p stent insertion that subsequently migrated and stent was removed 12/12/23  -GI consulted from ER, Dr. Arslan Myles had discussed with Dr. Wing who had  advised to hold consult and re-eval tomorrow if patient still having symptoms. Patient typically sees Dr. Ritchie and he is not in town  -PPI and pancrealipase     #H/o CAD s/p CABG  #Hypertension  #HLD  -Continue home meds, PRN hydralazine     #Recent alpha hemolytic strep bacteremia 12/2023  -Completed PO augmentin 12/27/23  -Follow repeat cultures    #Depression  -Sertraline    Discussed with patient, discussed with RN.  Discussed with pulmonary Dr. Licea who plans possible bronchoscopy if no improvement in breathing after 148 hours of treatment    Sakina Hernandez MD      Supplementary Documentation:     Quality:  DVT Mechanical Prophylaxis:   SCDs,    DVT Pharmacologic Prophylaxis   Medication    enoxaparin (Lovenox) 40 MG/0.4ML SUBQ injection 40 mg    heparin (Porcine) 100 Units/mL lock flush 500 Units     Code Status: Full Code  Neumann: No urinary catheter in place  SHUBHAM: TBD    Discharge is dependent on: Clinical state, consultant recs  At this point Mr. Cortez is expected to be discharge to: Home    The 21st Century Cures Act makes medical notes like these available to patients in the interest of transparency. Please be advised this is a medical document. Medical documents are intended to carry relevant information, facts as evident, and the clinical opinion of the practitioner. The medical note is intended as peer to peer communication and may appear blunt or direct. It is written in medical language and may contain abbreviations or verbiage that are unfamiliar.

## 2023-12-31 NOTE — PROGRESS NOTES
[facility] Pulmonary Progress Note    Dontae Cortez Jr. Patient Status:  Inpatient    10/15/1970 MRN [id number]   Location [facility] 4NW-A Attending Sakina Hernandez MD   Hosp Day # 2 PCP Adrian Horowitz MD     Subjective:  Dontae Cortez Jr. is a(n) 53 year old male who is admitted for diarrhea and pneumonia.    Overnight: SLP saw patient plan for video study, still with diarrhea, cough is about the same    Objective:  /63 (BP Location: Left arm)   Pulse 67   Temp 98.7 °F (37.1 °C) (Oral)   Resp 18   Ht 6' 2\" (1.88 m)   Wt 180 lb (81.6 kg)   SpO2 96%   BMI 23.11 kg/m²       Temp (24hrs), Av.6 °F (37 °C), Min:97.7 °F (36.5 °C), Max:99 °F (37.2 °C)      Intake/Output:    Intake/Output Summary (Last 24 hours) at 2023 1323  Last data filed at 2023 0500  Gross per 24 hour   Intake 2870 ml   Output --   Net 2870 ml       Physical Exam:   General: alert, cooperative, oriented.  No respiratory distress.   Head: Normocephalic, without obvious abnormality, atraumatic.   Throat: Lips, mucosa, and tongue normal.  No thrush noted.   Neck: trachea midline, no adenopathy, no thyromegaly. No JVD.   Lungs: clear to auscultation bilaterally   Chest wall: No tenderness or deformity.   Heart: regular rate and rhythm, S1, S2 normal, no murmur, click, rub or gallop   Abdomen: soft, non-distended, no masses, no guarding, no     Rebound.   Extremity: No edema or cyanosis   Skin: No rashes or lesions.   Neurological: Alert, interactive, no focal deficits    Lab Data Review:  Recent Labs     23  1555 23  0531   WBC 9.9 8.7   HGB 10.9* 10.4*   .0 244.0     Recent Labs     23  1556 23  0531 23  0510    139  --    K 3.6 3.4* 3.4*    108  --    CO2 28.0 27.0  --    BUN 13 16  --    CREATSERUM 0.59* 0.53*  --      No results for input(s): \"PTP\", \"INR\", \"PTT\" in the last 168 hours.    Cultures:   Hospital Encounter on 23   1. Blood Culture     Status:  None (Preliminary result)    Collection Time: 12/29/23  3:56 PM    Specimen: Blood,peripheral   Result Value Ref Range    Blood Culture Result No Growth 1 Day N/A       Radiology:  CT CHEST+ABDOMEN+PELVIS(ALL CNTRST ONLY)(PDK=94884/55565)  Narrative: PROCEDURE:  CT CHEST+ABDOMEN+PELVIS(ALL CNTRST ONLY)(CPT=71260/30316)     COMPARISON:  [place], CT, CT CHEST+ABDOMEN+PELVIS(ALL CNTRST ONLY)(CPT=71260/68229), 3/20/2023, 8:43 AM.  [facility], CT, CT CHEST+ABDOMEN+PELVIS(ALL CNTRST ONLY)(CPT=71260/16844), 2/28/2023, 7:33 AM.  [facility] , CT, CT CHEST PE AORTA (IV ONLY)   (CPT=71260), 12/16/2023, 9:36 PM.  [facility], XR, XR CHEST AP PORTABLE  (CPT=71045), 12/29/2023, 4:24 PM.     INDICATIONS:  Acute onset of chest and abdominal pain.     TECHNIQUE:  IV contrast-enhanced scanning through the chest, abdomen, and pelvis was performed.  Dose reduction techniques were used. Dose information is transmitted to the ACR (American College of Radiology) NRDR (National Radiology Data Registry) which   includes the Dose Index Registry.     PATIENT STATED HISTORY:(As transcribed by Technologist)  sudden onset chest/abdominal pain      CONTRAST USED:  80cc of Isovue 370     FINDINGS:       CHEST:    LUNGS:  There are increasing ground-glass opacities throughout the left upper lobe as well as to a lesser degree within the left lower lobe.  This is seen over a background of reticular nodular opacities that are diffusely seen throughout the lower lobes   and right middle lobe.  These opacities also appear to be somewhat increased in severity from the previous study.  MEDIASTINUM:  No mediastinal mass lesions or adenopathy.  Multiple shoddy mediastinal lymph nodes.  Changes of gastric pull-through are stable from prior imaging.  AIDAN:  No mass or adenopathy.    CARDIAC:  Stable changes of prior CABG.  Mild cardiomegaly.  No pericardial effusion.  Extensive atherosclerotic calcifications of the native coronary vessels.  PLEURA:  No mass or  effusion.    CHEST WALL:  No mass or axillary adenopathy.    AORTA:  Ectasia of the ascending thoracic aorta measuring 41 mm.  Mild atherosclerotic changes.    VASCULATURE:  No visible pulmonary arterial thrombus or attenuation.       ABDOMEN/PELVIS:  LIVER:  No enlargement, atrophy, abnormal density, or significant focal lesion.    BILIARY:  No visible dilatation or calcification.    PANCREAS:  Diffuse pancreatic atrophy with fatty infiltration.  SPLEEN:  Borderline splenomegaly measuring 13.7 cm.   KIDNEYS:  No mass, obstruction, or calcification.    ADRENALS:  No mass or enlargement.    AORTA:  Mild atherosclerotic calcifications of the aorta and iliac vessels.  No ectasia or aneurysm.  RETROPERITONEUM:  No mass or adenopathy.    BOWEL/MESENTERY:  The stomach, duodenum sweep and small bowel are unremarkable.  Colonic wall thickening along the descending colon and rectosigmoid colon suspicious for colitis.  Mild scattered diverticular changes without acute diverticulitis.  ABDOMINAL WALL:  No mass or hernia.    URINARY BLADDER:  No visible focal wall thickening, lesion, or calculus.    PELVIC NODES:  No adenopathy.    PELVIC ORGANS:  No visible mass.  Pelvic organs appropriate for patient age.    BONES:  No bony lesion or fracture.                     Impression: CONCLUSION:    1. Relative to the prior exam, there are increasing ground-glass opacities throughout the left upper lobe and to a lesser degree the left lower lobe, superimposed over diffuse reticular nodular opacities, suggestive of a progressive atypical pneumonia.  2. Suggestion of colitis involving the descending and rectosigmoid colon.  3. Please see the body of the report above for further, less significant details.  The preliminary report was reviewed.           LOCATION:  [place]           Dictated by (CST): Joseph Mcdaniels DO on 12/30/2023 at 9:50 AM       Finalized by (CST): Joseph Mcdaniels DO on 12/30/2023 at 9:58 AM         Medications reviewed      Assessment and Plan:   Patient Active Problem List   Diagnosis    Primary hypertension    Hyperlipidemia with target low density lipoprotein (LDL) cholesterol less than 70 mg/dL    S/P CABG x 4    Nontoxic multinodular goiter    Pulmonary nodules    Thrombophlebitis leg    BRANDAN (generalized anxiety disorder)    Right shoulder Arthroscopy acromioplasty ,distal  clavicle resection, rotator cuff/labral debridment  Global 05/13/2021    Right bicipital tenosynovitis    Malignant neoplasm of esophagus (HCC)    Esophageal anastomotic leak    Esophageal obstruction    On total parenteral nutrition (TPN)    Mechanical complication of esophagostomy (HCC)    Abdominal pain of unknown etiology    Migration of esophageal stent    Gastroparesis    Aspiration pneumonia of both lungs due to gastric secretions (HCC)    Esophageal carcinoma (HCC)    Normocytic anemia    Esophageal fistula    Postnasal drip    Acute cough    Acquired bronchoesophageal fistula (HCC)    Pneumonia of both lower lobes due to infectious organism    Malignant neoplasm of pancreas (HCC)    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 difficile colitis       Assessment:  Sepsis secondary to C diff positive and COVID positive.  Left lung infiltrate noted, which is new compared to CT from 12/16, aspiration versus HCAP  C Diff positive  COVID +   Possible HCAP  Esophageal cancer c/b post op anasthomatoc leak  Pancreatic mass s/p stent insertion that was removed on 12/12/23        Plan:  ID consulted, on zosyn and PO vancomycin  Follow up noninvasive serologies  Started on breo, continue  If no  improvement in breathing after about 48 hours of treatment would consider bronchoscopy     Rey Licea MD  [facility]  Office: [phone]

## 2023-12-31 NOTE — PLAN OF CARE
A/o x4. Productive cough, thick sputum noted. Takes pill one at the time with sips of water. Aspiration precaution maintained. Still c/o right side pain. Prn dilaudid given q 2-3 hrs. As needed. Reports diarrhea, on PO vanco. Desats to 86% on room air while sleeping. 2 LNC placed with saturation 94%.    Problem: RESPIRATORY - ADULT  Goal: Achieves optimal ventilation and oxygenation  Description: INTERVENTIONS:  - Assess for changes in respiratory status  - Assess for changes in mentation and behavior  - Position to facilitate oxygenation and minimize respiratory effort  - Oxygen supplementation based on oxygen saturation or ABGs  - Provide Smoking Cessation handout, if applicable  - Encourage broncho-pulmonary hygiene including cough, deep breathe, Incentive Spirometry  - Assess the need for suctioning and perform as needed  - Assess and instruct to report SOB or any respiratory difficulty  - Respiratory Therapy support as indicated  - Manage/alleviate anxiety  - Monitor for signs/symptoms of CO2 retention  Outcome: Progressing

## 2023-12-31 NOTE — PROGRESS NOTES
[facility]                INFECTIOUS DISEASE PROGRESS NOTE    Dontae Buddy Cortez . Patient Status:  Inpatient    10/15/1970 MRN [id number]   Former[facility] 4NW-A Attending Sakina Hernandez MD   Hosp Day # 2 PCP Adrian Horowitz MD     Antibiotics: Zosyn #3, Doxycycline #2, PO Vancomycin #2    Subjective:  Afebrile overnight. Npo. Plans for swallow test. VRP negative. Sputum cx with polymicrobial GS, culture growing normal zeinab and staph aureus. Mrsa nares screen negative . Patient reports less abdominal pain today, comfortable in bed this morning. Still had 3-4 watery bowel movements overnight.     Objective:  Temp:  [97.7 °F (36.5 °C)-99 °F (37.2 °C)] 98.7 °F (37.1 °C)  Pulse:  [67-82] 67  Resp:  [14-18] 18  BP: (128-152)/(63-72) 130/63  SpO2:  [90 %-96 %] 96 %    General: NAD, A&Ox3  Vital signs:Temp:  [97.7 °F (36.5 °C)-99 °F (37.2 °C)] 98.7 °F (37.1 °C)  Pulse:  [67-82] 67  Resp:  [14-18] 18  BP: (128-152)/(63-72) 130/63  SpO2:  [90 %-96 %] 96 %  HEENT: Moist mucous membranes. Extraocular muscles are intact.  Neck: supple no masses  Respiratory: Non labored, symmetric excursion, normal respirations, + productive cough   Cardiovascular: no irregularities in rhythm  Abdomen: Soft, nontender, nondistended.   Musculoskeletal: joints: no swelling   Integument: No lesions. No erythema. No open wounds.    Labs:       Recent Labs   Lab 23  0531   RBC 3.44*   HGB 10.4*   HCT 33.5*   MCV 97.4   MCH 30.2   MCHC 31.0   RDW 13.2   NEPRELIM 7.20   WBC 8.7   .0         Recent Labs   Lab 23  0748 23  1556 23  0531 23  0510   * 112* 111*  --    BUN 10 13 16  --    CREATSERUM 0.55* 0.59* 0.53*  --    CA 9.2 8.7 8.9  --    ALB 2.8* 2.8* 2.7*  --     139 139  --    K 4.0 3.6 3.4* 3.4*    107 108  --    CO2 27.0 28.0 27.0  --    ALKPHO 118* 105 106  --    AST 22 19 19  --    ALT 28 26 22  --    BILT 0.4 0.3 0.3  --    TP 7.5 7.4 7.2  --        No  results found for: \"VANCT\"  Microbiology    Hospital Encounter on 12/29/23   1. Blood Culture     Status: None (Preliminary result)    Collection Time: 12/29/23  3:56 PM    Specimen: Blood,peripheral   Result Value Ref Range    Blood Culture Result No Growth 1 Day N/A         Radiology    Imaging:  Narrative   PROCEDURE:  CT CHEST+ABDOMEN+PELVIS(ALL CNTRST ONLY)(CPT=71260/66199)     COMPARISON:  [place], CT, CT CHEST+ABDOMEN+PELVIS(ALL CNTRST ONLY)(CPT=71260/07633), 3/20/2023, 8:43 AM.  [facility], CT, CT CHEST+ABDOMEN+PELVIS(ALL CNTRST ONLY)(CPT=71260/51925), 2/28/2023, 7:33 AM.  [facility] , CT, CT CHEST PE AORTA (IV ONLY)  (CPT=71260), 12/16/2023, 9:36 PM.  [facility], XR, XR CHEST AP PORTABLE  (CPT=71045), 12/29/2023, 4:24 PM.     INDICATIONS:  Acute onset of chest and abdominal pain.     TECHNIQUE:  IV contrast-enhanced scanning through the chest, abdomen, and pelvis was performed.  Dose reduction techniques were used. Dose information is transmitted to the ACR (American College of Radiology) NRDR (National Radiology Data Registry) which   includes the Dose Index Registry.     PATIENT STATED HISTORY:(As transcribed by Technologist)  sudden onset chest/abdominal pain      CONTRAST USED:  80cc of Isovue 370     FINDINGS:       CHEST:    LUNGS:  There are increasing ground-glass opacities throughout the left upper lobe as well as to a lesser degree within the left lower lobe.  This is seen over a background of reticular nodular opacities that are diffusely seen throughout the lower lobes   and right middle lobe.  These opacities also appear to be somewhat increased in severity from the previous study.  MEDIASTINUM:  No mediastinal mass lesions or adenopathy.  Multiple shoddy mediastinal lymph nodes.  Changes of gastric pull-through are stable from prior imaging.  AIDAN:  No mass or adenopathy.    CARDIAC:  Stable changes of prior CABG.  Mild cardiomegaly.  No pericardial effusion.  Extensive atherosclerotic  calcifications of the native coronary vessels.  PLEURA:  No mass or effusion.    CHEST WALL:  No mass or axillary adenopathy.    AORTA:  Ectasia of the ascending thoracic aorta measuring 41 mm.  Mild atherosclerotic changes.    VASCULATURE:  No visible pulmonary arterial thrombus or attenuation.       ABDOMEN/PELVIS:  LIVER:  No enlargement, atrophy, abnormal density, or significant focal lesion.    BILIARY:  No visible dilatation or calcification.    PANCREAS:  Diffuse pancreatic atrophy with fatty infiltration.  SPLEEN:  Borderline splenomegaly measuring 13.7 cm.  KIDNEYS:  No mass, obstruction, or calcification.    ADRENALS:  No mass or enlargement.    AORTA:  Mild atherosclerotic calcifications of the aorta and iliac vessels.  No ectasia or aneurysm.  RETROPERITONEUM:  No mass or adenopathy.    BOWEL/MESENTERY:  The stomach, duodenum sweep and small bowel are unremarkable.  Colonic wall thickening along the descending colon and rectosigmoid colon suspicious for colitis.  Mild scattered diverticular changes without acute diverticulitis.  ABDOMINAL WALL:  No mass or hernia.    URINARY BLADDER:  No visible focal wall thickening, lesion, or calculus.    PELVIC NODES:  No adenopathy.    PELVIC ORGANS:  No visible mass.  Pelvic organs appropriate for patient age.    BONES:  No bony lesion or fracture.                     Impression   CONCLUSION:    1. Relative to the prior exam, there are increasing ground-glass opacities throughout the left upper lobe and to a lesser degree the left lower lobe, superimposed over diffuse reticular nodular opacities, suggestive of a progressive atypical pneumonia.  2. Suggestion of colitis involving the descending and rectosigmoid colon.  3. Please see the body of the report above for further, less significant details.  The preliminary report was reviewed.       Problem list reviewed:  Patient Active Problem List   Diagnosis    Primary hypertension    Hyperlipidemia with target low  density lipoprotein (LDL) cholesterol less than 70 mg/dL    S/P CABG x 4    Nontoxic multinodular goiter    Pulmonary nodules    Thrombophlebitis leg    BRANDAN (generalized anxiety disorder)    Right shoulder Arthroscopy acromioplasty ,distal  clavicle resection, rotator cuff/labral debridment  Global 05/13/2021    Right bicipital tenosynovitis    Malignant neoplasm of esophagus (HCC)    Esophageal anastomotic leak    Esophageal obstruction    On total parenteral nutrition (TPN)    Mechanical complication of esophagostomy (HCC)    Abdominal pain of unknown etiology    Migration of esophageal stent    Gastroparesis    Aspiration pneumonia of both lungs due to gastric secretions (HCC)    Esophageal carcinoma (HCC)    Normocytic anemia    Esophageal fistula    Postnasal drip    Acute cough    Acquired bronchoesophageal fistula (HCC)    Pneumonia of both lower lobes due to infectious organism    Malignant neoplasm of pancreas (HCC)    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 difficile colitis           ASSESSMENT:     C diff diarrhea   Symptoms started 12/29   PCR and EIA positive   GI panel negative   Today with ongoing watery bowel movements but abdominal pain is improved   Pneumonia   + productive cough on admission  Chronically aspirating  Recent covid 12/10  CT with opacities suggestive of atypical pneumonia  On room air at this time, briefly on 2L NC overnight   Coughing up lots of sputum, Sputum less thick today  Strep pneumo ag negative, legionella negative   VRP negative  Sputum cx in process, GS polymicrobial, Cx + staph arueus (sens pending) and normal  zeinab  MRSA nares negative   Recent Streptococcal bacteremia. 1/2 Bcxs + 12/16 with strep gordonii. No fevers. Suspect contaminant. S/p abx course EOT 12/27  Gastric adenocarcinoma s/p resection. Imaging without evidence of anastomotic leak.  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      PLAN:   - vancomycin 125mg PO q 6 hrs for C diff  - continue to monitor Bms / abdominal pain for clinical improvement   - continue zosyn for empiric PNA treatment  - continue doxy for atypical coverage   - follow up sputum cx - staph aureus and normal zeinab growing (sens pending, though mrsa screen pcr negative)  - monitor O2 requirements   - we will follow along       [facility]  [phone]

## 2024-01-01 ENCOUNTER — NURSE ONLY (OUTPATIENT)
Dept: LAB | Facility: HOSPITAL | Age: 54
End: 2024-01-01
Attending: INTERNAL MEDICINE
Payer: COMMERCIAL

## 2024-01-01 DIAGNOSIS — G89.3 NEOPLASM RELATED PAIN: ICD-10-CM

## 2024-01-01 DIAGNOSIS — C15.9 ESOPHAGEAL CANCER (HCC): ICD-10-CM

## 2024-01-01 DIAGNOSIS — C15.5 MALIGNANT NEOPLASM OF LOWER THIRD OF ESOPHAGUS (HCC): ICD-10-CM

## 2024-01-01 LAB
INR BLD: 1.17 (ref 0.8–1.2)
PROTHROMBIN TIME: 15 SECONDS (ref 11.6–14.8)

## 2024-01-01 PROCEDURE — 99232 SBSQ HOSP IP/OBS MODERATE 35: CPT | Performed by: INTERNAL MEDICINE

## 2024-01-01 PROCEDURE — 99233 SBSQ HOSP IP/OBS HIGH 50: CPT | Performed by: INTERNAL MEDICINE

## 2024-01-01 PROCEDURE — 85610 PROTHROMBIN TIME: CPT

## 2024-01-01 RX ORDER — DICYCLOMINE HYDROCHLORIDE 10 MG/1
10 CAPSULE ORAL 4 TIMES DAILY
Qty: 90 CAPSULE | Refills: 0 | Status: CANCELLED | OUTPATIENT
Start: 2024-01-01

## 2024-01-01 RX ORDER — OXYCODONE HCL 10 MG/1
10 TABLET, FILM COATED, EXTENDED RELEASE ORAL EVERY 8 HOURS
Status: DISCONTINUED | OUTPATIENT
Start: 2024-01-01 | End: 2024-01-03

## 2024-01-01 RX ORDER — IPRATROPIUM BROMIDE AND ALBUTEROL SULFATE 2.5; .5 MG/3ML; MG/3ML
3 SOLUTION RESPIRATORY (INHALATION)
Status: DISCONTINUED | OUTPATIENT
Start: 2024-01-02 | End: 2024-01-02

## 2024-01-01 RX ORDER — OXYCODONE HYDROCHLORIDE 5 MG/1
5 TABLET ORAL EVERY 6 HOURS PRN
Qty: 60 TABLET | Refills: 0 | Status: CANCELLED | OUTPATIENT
Start: 2024-01-01

## 2024-01-01 RX ORDER — SODIUM CHLORIDE FOR INHALATION 3 %
3 VIAL, NEBULIZER (ML) INHALATION
Status: DISCONTINUED | OUTPATIENT
Start: 2024-01-02 | End: 2024-01-02

## 2024-01-01 RX ORDER — BACLOFEN 10 MG/1
10 TABLET ORAL 3 TIMES DAILY PRN
Qty: 90 TABLET | Refills: 0 | Status: CANCELLED | OUTPATIENT
Start: 2024-01-01

## 2024-01-01 RX ORDER — DICYCLOMINE HYDROCHLORIDE 10 MG/1
10 CAPSULE ORAL 4 TIMES DAILY
Qty: 90 CAPSULE | Refills: 1 | Status: CANCELLED | OUTPATIENT
Start: 2024-01-01

## 2024-01-01 RX ORDER — PANCRELIPASE 24000; 76000; 120000 [USP'U]/1; [USP'U]/1; [USP'U]/1
CAPSULE, DELAYED RELEASE PELLETS ORAL
Qty: 300 CAPSULE | Refills: 0 | Status: CANCELLED | OUTPATIENT
Start: 2024-01-01

## 2024-01-01 RX ORDER — BENZONATATE 100 MG/1
100 CAPSULE ORAL 3 TIMES DAILY PRN
Qty: 90 CAPSULE | Refills: 0 | Status: SHIPPED | OUTPATIENT
Start: 2024-01-01 | End: 2024-01-01

## 2024-01-01 RX ORDER — IPRATROPIUM BROMIDE AND ALBUTEROL SULFATE 2.5; .5 MG/3ML; MG/3ML
3 SOLUTION RESPIRATORY (INHALATION) EVERY 4 HOURS PRN
Status: DISCONTINUED | OUTPATIENT
Start: 2024-01-01 | End: 2024-01-03

## 2024-01-01 RX ORDER — PANTOPRAZOLE SODIUM 40 MG/1
40 TABLET, DELAYED RELEASE ORAL
Qty: 30 TABLET | Refills: 0 | Status: CANCELLED | OUTPATIENT
Start: 2024-01-01

## 2024-01-01 NOTE — PLAN OF CARE
Patient is alert and orientated , VSS, RA, afebrile and has severe pain relieved with oral and IV pain meds. All meds given per MAR including IV zoysn and oral vanco. Swallow study ordered, will be done tomorrow. Regular diet and no thin liquids are ok. Patient is ambulatory and independent. Patient positive for Cdif. Call light and safety precautions are in place.    Problem: RESPIRATORY - ADULT  Goal: Achieves optimal ventilation and oxygenation  Description: INTERVENTIONS:  - Assess for changes in respiratory status  - Assess for changes in mentation and behavior  - Position to facilitate oxygenation and minimize respiratory effort  - Oxygen supplementation based on oxygen saturation or ABGs  - Provide Smoking Cessation handout, if applicable  - Encourage broncho-pulmonary hygiene including cough, deep breathe, Incentive Spirometry  - Assess the need for suctioning and perform as needed  - Assess and instruct to report SOB or any respiratory difficulty  - Respiratory Therapy support as indicated  - Manage/alleviate anxiety  - Monitor for signs/symptoms of CO2 retention  Outcome: Progressing     Problem: GASTROINTESTINAL - ADULT  Goal: Maintains or returns to baseline bowel function  Description: INTERVENTIONS:  - Assess bowel function  - Maintain adequate hydration with IV or PO as ordered and tolerated  - Evaluate effectiveness of GI medications  - Encourage mobilization and activity  - Obtain nutritional consult as needed  - Establish a toileting routine/schedule  - Consider collaborating with pharmacy to review patient's medication profile  Outcome: Progressing  Goal: Maintains adequate nutritional intake (undernourished)  Description: INTERVENTIONS:  - Monitor percentage of each meal consumed  - Identify factors contributing to decreased intake, treat as appropriate  - Assist with meals as needed  - Monitor I&O, WT and lab values  - Obtain nutritional consult as needed  - Optimize oral hygiene and moisture  -  Encourage food from home; allow for food preferences  - Enhance eating environment  Outcome: Progressing     Problem: METABOLIC/FLUID AND ELECTROLYTES - ADULT  Goal: Glucose maintained within prescribed range  Description: INTERVENTIONS:  - Monitor Blood Glucose as ordered  - Assess for signs and symptoms of hyperglycemia and hypoglycemia  - Administer ordered medications to maintain glucose within target range  - Assess barriers to adequate nutritional intake and initiate nutrition consult as needed  - Instruct patient on self management of diabetes  Outcome: Progressing  Goal: Electrolytes maintained within normal limits  Description: INTERVENTIONS:  - Monitor labs and rhythm and assess patient for signs and symptoms of electrolyte imbalances  - Administer electrolyte replacement as ordered  - Monitor response to electrolyte replacements, including rhythm and repeat lab results as appropriate  - Fluid restriction as ordered  - Instruct patient on fluid and nutrition restrictions as appropriate  Outcome: Progressing  Goal: Hemodynamic stability and optimal renal function maintained  Description: INTERVENTIONS:  - Monitor labs and assess for signs and symptoms of volume excess or deficit  - Monitor intake, output and patient weight  - Monitor urine specific gravity, serum osmolarity and serum sodium as indicated or ordered  - Monitor response to interventions for patient's volume status, including labs, urine output, blood pressure (other measures as available)  - Encourage oral intake as appropriate  - Instruct patient on fluid and nutrition restrictions as appropriate  Outcome: Progressing     Problem: PAIN - ADULT  Goal: Verbalizes/displays adequate comfort level or patient's stated pain goal  Description: INTERVENTIONS:  - Encourage pt to monitor pain and request assistance  - Assess pain using appropriate pain scale  - Administer analgesics based on type and severity of pain and evaluate response  - Implement  non-pharmacological measures as appropriate and evaluate response  - Consider cultural and social influences on pain and pain management  - Manage/alleviate anxiety  - Utilize distraction and/or relaxation techniques  - Monitor for opioid side effects  - Notify MD/LIP if interventions unsuccessful or patient reports new pain  - Anticipate increased pain with activity and pre-medicate as appropriate  Outcome: Progressing

## 2024-01-01 NOTE — PROGRESS NOTES
[facility]  Pulmonary/Critical Care/Sleep Medicine Progress note    Dontae Cortez . Patient Status:  Inpatient    10/15/1970 MRN [id number]   Location [facility] 4NW-A Attending Sakina Hernandez MD   Hosp Day # 3 PCP Adrian Horowitz MD     Chief Complaint   Patient presents with    Nausea/Vomiting/Diarrhea    Difficulty Breathing    Abdomen/Flank Pain        History of Present Illness:  Remains on room air with oxygen saturations between 91 to 96%    Breathing okay    History:  Past Medical History:   Diagnosis Date    Back problem     Belching 2022    Black stools 2022    Borderline diabetes     Dx in 2013 - HgA1C 6.2%    C. difficile diarrhea 10/23/2022    pt treated and without symptoms    Chest pain 2022    Coronary artery disease     On 13: CABG x 4 with LIMA to LAD and SVG to diagonal, OM and PDA    Decorative tattoo 2000    Depression     Esophageal cancer (HCC)     completed chemo    Esophageal reflux     Essential hypertension     Exposure to medical diagnostic radiation     last tx 2022    Frequent urination 2013    Gastroparesis     Heartburn 2022    High blood pressure     High cholesterol 2013    Found when I had quadruple bypass    History of COVID-19 10/16/2022    asymptomatic - pt was dx during a hospitalization for another diagnosis. No continued symptoms    History of stomach ulcers     Hyperlipidemia     Hyperlipidemia LDL goal < 70     Indigestion 2022    Morbid obesity with BMI of 40.0-44.9, adult (HCC)     Muscle weakness     Nontoxic multinodular goiter     Dx in 2013: pt was told that imaging showed thyroid cysts per PCP    Pancreatic cancer (HCC)     last dose 2023 is scheduled for another round 23    Peripheral vascular disease (HCC)     pt denies    Personal history of antineoplastic chemotherapy     for esophageal cancer/completed    Personal history of antineoplastic chemotherapy     pancreatic  cancer    Problems with swallowing 02/01/2022    Pulmonary nodules     Dx in 8/2013: CT chest showed small bilateral fissural-based lung nodules less than 1 cm    S/P CABG x 4     On 8/16/13: CABG x 4 with LIMA to LAD and SVG to diagonal, OM and PDA    Shortness of breath     Vomiting 02/01/2022     Past Surgical History:   Procedure Laterality Date    APPENDECTOMY      APPENDECTOMY      ARTHROSCOPY OF JOINT UNLISTED      right shoulder    CABG      On 8/16/13: CABG x 4 with LIMA to LAD and SVG to diagonal, OM and PDA    CARDIAC CATH LAB      On 8/14/2013: cardiac cath showed 3-vessel disease    OTHER SURGICAL HISTORY      1.       Laparoscopic robotic-assisted esophagogastrectomy.     Family History   Problem Relation Age of Onset    Cancer Mother         breast and colon     Diabetes Neg       reports that he quit smoking about 10 years ago. His smoking use included cigarettes. He has never used smokeless tobacco. He reports that he does not currently use alcohol. He reports that he does not use drugs.    Allergies:  No Known Allergies    Medications:    Current Facility-Administered Medications:     HYDROmorphone (Dilaudid) 1 MG/ML injection 0.4 mg, 0.4 mg, Intravenous, Q2H PRN **OR** HYDROmorphone (Dilaudid) 1 MG/ML injection 0.8 mg, 0.8 mg, Intravenous, Q2H PRN **OR** HYDROmorphone (Dilaudid) 1 MG/ML injection 1.2 mg, 1.2 mg, Intravenous, Q2H PRN    vancomycin (Vancocin) cap 125 mg, 125 mg, Oral, Q6H    ipratropium-albuterol (Duoneb) 0.5-2.5 (3) MG/3ML inhalation solution 3 mL, 3 mL, Nebulization, 6 times per day    pancrelipase (Lip-Prot-Amyl) (Zenpep) DR particles cap 25,000 Units, 25,000 Units, Oral, TID CC    doxycycline (Vibramycin) cap 100 mg, 100 mg, Oral, 2 times per day    sodium chloride 0.9% infusion, , Intravenous, Continuous    enoxaparin (Lovenox) 40 MG/0.4ML SUBQ injection 40 mg, 40 mg, Subcutaneous, Daily    acetaminophen (Tylenol Extra Strength) tab 500 mg, 500 mg, Oral, Q4H PRN    melatonin  tab 3 mg, 3 mg, Oral, Nightly PRN    polyethylene glycol (PEG 3350) (Miralax) 17 g oral packet 17 g, 17 g, Oral, Daily PRN    sennosides (Senokot) tab 17.2 mg, 17.2 mg, Oral, Nightly PRN    bisacodyl (Dulcolax) 10 MG rectal suppository 10 mg, 10 mg, Rectal, Daily PRN    fleet enema (Fleet) 7-19 GM/118ML rectal enema 133 mL, 1 enema, Rectal, Once PRN    ondansetron (Zofran) 4 MG/2ML injection 4 mg, 4 mg, Intravenous, Q6H PRN    prochlorperazine (Compazine) 10 MG/2ML injection 5 mg, 5 mg, Intravenous, Q8H PRN    benzonatate (Tessalon) cap 200 mg, 200 mg, Oral, TID PRN    glycerin-hypromellose- (Artifical Tears) 0.2-0.2-1 % ophthalmic solution 1 drop, 1 drop, Both Eyes, QID PRN    sodium chloride (Saline Mist) 0.65 % nasal solution 1 spray, 1 spray, Each Nare, Q3H PRN    pantoprazole (Protonix) 40 mg in sodium chloride 0.9% PF 10 mL IV push, 40 mg, Intravenous, Q12H    fluticasone furoate-vilanterol (Breo Ellipta) 100-25 MCG/ACT inhaler 1 puff, 1 puff, Inhalation, Daily    piperacillin-tazobactam (Zosyn) 3.375 g in dextrose 5% 100 mL IVPB-ADDV, 3.375 g, Intravenous, Q8H    guaiFENesin-codeine (Robitussin AC) 100-10 MG/5ML oral solution 5 mL, 5 mL, Oral, Q4H PRN    heparin (Porcine) 100 Units/mL lock flush 500 Units, 5 mL, Intravenous, PRN    fluticasone propionate (Flonase) 50 MCG/ACT nasal suspension 1 spray, 1 spray, Each Nare, Daily    baclofen (Lioresal) tab 10 mg, 10 mg, Oral, TID PRN    cetirizine (ZyrTEC) tab 5 mg, 5 mg, Oral, Daily    losartan (Cozaar) tab 50 mg, 50 mg, Oral, Daily    metoprolol succinate ER (Toprol XL) 24 hr tab 50 mg, 50 mg, Oral, Daily Beta Blocker    sertraline (Zoloft) tab 100 mg, 100 mg, Oral, Daily    sodium chloride (hypertonic) 3 % nebulizer solution 3 mL, 3 mL, Nebulization, Q8H    sucralfate (Carafate) tab 1 g, 1 g, Oral, TID PRN    diphenhydrAMINE (Benadryl) 50 mg/mL  injection 25 mg, 25 mg, Intravenous, Q8H PRN    hydrALAzine (Apresoline) 20 mg/mL injection 5 mg, 5 mg,  Intravenous, Q6H PRN    Intake/Output:    Intake/Output Summary (Last 24 hours) at 1/1/2024 1010  Last data filed at 1/1/2024 0647  Gross per 24 hour   Intake 1211 ml   Output --   Net 1211 ml          Review of Systems    Review of Systems: A comprehensive 10 point review of systems was completed.  Pertinent positives and negatives noted in the the HPI.         Patient Vitals for the past 24 hrs:   BP Temp Temp src Pulse Resp SpO2   01/01/24 0651 133/59 98.6 °F (37 °C) Oral 67 14 91 %   01/01/24 0149 -- 98.1 °F (36.7 °C) Oral -- -- --   12/31/23 2005 120/61 98.9 °F (37.2 °C) Oral 73 16 92 %   12/31/23 1100 130/63 98.7 °F (37.1 °C) Oral 67 18 96 %     Vitals:    12/31/23 1100 12/31/23 2005 01/01/24 0149 01/01/24 0651   BP: 130/63 120/61  133/59   BP Location: Left arm Right arm  Right arm   Pulse: 67 73  67   Resp: 18 16  14   Temp: 98.7 °F (37.1 °C) 98.9 °F (37.2 °C) 98.1 °F (36.7 °C) 98.6 °F (37 °C)   TempSrc: Oral Oral Oral Oral   SpO2: 96% 92%  91%   Weight:       Height:          Body mass index is 23.11 kg/m².     Physical Exam  Constitutional:       General: He is not in acute distress.     Appearance: Normal appearance. He is not ill-appearing or diaphoretic.   HENT:      Head: Normocephalic and atraumatic.      Nose: Nose normal. No congestion or rhinorrhea.      Mouth/Throat:      Mouth: Mucous membranes are moist.      Pharynx: Oropharynx is clear. No oropharyngeal exudate or posterior oropharyngeal erythema.   Eyes:      Extraocular Movements: Extraocular movements intact.      Pupils: Pupils are equal, round, and reactive to light.   Cardiovascular:      Rate and Rhythm: Normal rate.      Pulses: Normal pulses.      Heart sounds: Normal heart sounds. No murmur heard.  Pulmonary:      Effort: Pulmonary effort is normal. No respiratory distress.      Breath sounds: Normal breath sounds. No wheezing or rhonchi.      Comments: Diminished breath sounds bilateral lower lobes otherwise clear to  auscultation  Chest:      Chest wall: No tenderness.   Abdominal:      General: Abdomen is flat. Bowel sounds are normal.      Palpations: Abdomen is soft.   Musculoskeletal:         General: Normal range of motion.   Skin:     General: Skin is warm.   Neurological:      General: No focal deficit present.      Mental Status: He is alert and oriented to person, place, and time.   Psychiatric:         Mood and Affect: Mood normal.         Behavior: Behavior normal.         Thought Content: Thought content normal.         Judgment: Judgment normal.         Recent Labs   Lab 12/26/23  0748 12/29/23  1555 12/30/23  0531   WBC 13.8* 9.9 8.7   HGB 11.1* 10.9* 10.4*   HCT 35.0* 33.8* 33.5*   .0 278.0 244.0       Recent Labs   Lab 12/26/23  0748 12/29/23  1556 12/30/23  0531 12/31/23  0510    139 139  --    K 4.0 3.6 3.4* 3.4*    107 108  --    CO2 27.0 28.0 27.0  --    BUN 10 13 16  --    CREATSERUM 0.55* 0.59* 0.53*  --    CA 9.2 8.7 8.9  --    ALB 2.8* 2.8* 2.7*  --    ALKPHO 118* 105 106  --    ALT 28 26 22  --    AST 22 19 19  --    * 112* 111*  --        No results for input(s): \"MG\" in the last 168 hours.    Lab Results   Component Value Date    PHOS 3.5 11/27/2023        No results for input(s): \"PT\", \"INR\", \"PTT\" in the last 168 hours.    No results for input(s): \"ABGPHT\", \"FKHDWC5N\", \"CZVOS4C\", \"ABGHCO3\", \"ABGBE\", \"TEMP\", \"TACOS\", \"SITE\", \"DEV\", \"THGB\" in the last 168 hours.    No results for input(s): \"TROP\", \"CKMB\" in the last 168 hours.      Cultures:   Hospital Encounter on 12/29/23   1. Blood Culture     Status: None (Preliminary result)    Collection Time: 12/29/23  3:56 PM    Specimen: Blood,peripheral   Result Value Ref Range    Blood Culture Result No Growth 2 Days N/A              Component  Ref Range & Units 12/31/23 0510   Procalcitonin  <0.16 ng/mL 0.23 High           Radiology personally reviewed:  No results found.     Patient Active Problem List   Diagnosis    Primary  hypertension    Hyperlipidemia with target low density lipoprotein (LDL) cholesterol less than 70 mg/dL    S/P CABG x 4    Nontoxic multinodular goiter    Pulmonary nodules    Thrombophlebitis leg    BRANDAN (generalized anxiety disorder)    Right shoulder Arthroscopy acromioplasty ,distal  clavicle resection, rotator cuff/labral debridment  Global 05/13/2021    Right bicipital tenosynovitis    Malignant neoplasm of esophagus (HCC)    Esophageal anastomotic leak    Esophageal obstruction    On total parenteral nutrition (TPN)    Mechanical complication of esophagostomy (HCC)    Abdominal pain of unknown etiology    Migration of esophageal stent    Gastroparesis    Aspiration pneumonia of both lungs due to gastric secretions (HCC)    Esophageal carcinoma (HCC)    Normocytic anemia    Esophageal fistula    Postnasal drip    Acute cough    Acquired bronchoesophageal fistula (HCC)    Pneumonia of both lower lobes due to infectious organism    Malignant neoplasm of pancreas (HCC)    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 difficile colitis     Assessment:  Sepsis secondary to C diff positive and COVID positive.  Left lung infiltrate noted, which is new compared to CT from 12/16, aspiration versus HCAP; CT Chest 12/30/2023 : increasing ground-glass opacities throughout the left upper lobe as well as to a lesser degree within the left lower lobe.  This is seen over a background of reticular nodular opacities that are diffusely seen throughout the lower lobes and right middle lobe.  Procalcitonin minimally elevated, strep pneumonia and Legionella urinary antigen  negative.  Histoplasma galactomannan and Blastomyces dermatitidis antigen pending  C Diff positive: Still with intermittent diarrhea  COVID +   Possible HCAP  Recurrent bronchitis: Suspect due to aspiration  Esophageal cancer c/b post op anasthomatoc leak  Pancreatic mass s/p stent insertion that was removed on 12/12/23      Plan:  Video swallow study planned by speech pathology  Continue zosyn   PO vancomycin per infectious disease  ID follow-up  Continue Breo Ellipta 100-25 mcg 1 puff daily  If cough remains persistent, may consider bronchoscopy for endobronchial survey  Follow labs, procalcitonin and chest x-ray    Dusty Brooks MD    Thank You for allowing me to participate in this patient's care     Dusty Brooks MD

## 2024-01-01 NOTE — PROGRESS NOTES
[facility]     Hospitalist Progress Note     Dontae Buddy Ortegacristina Champion Patient Status:  Inpatient    10/15/1970 MRN [id number]   Location [facility] 4NW-A Attending Jose Roberto Myles,    Hosp Day # 3 PCP Adrian Horowitz MD     Chief Complaint: Malaise, chills    Subjective:     Patient reports subjective cough, has some shortness of breath, has some pleuritic chest pain on the right.  Afebrile today.  Had diarrhea multiple episodes again today according to patient,  on oral vancomycin for C. difficile.  Complains of right-sided pleuritic chest pain, patient states current pain medications not lasting long enough to control the pain and occasionally has 10 out of 10 pain.    Objective:    Review of Systems:   A comprehensive review of systems was completed; pertinent positive and negatives stated in subjective.    Vital signs:  Temp:  [98.1 °F (36.7 °C)-98.9 °F (37.2 °C)] 98.4 °F (36.9 °C)  Pulse:  [63-73] 63  Resp:  [14-18] 18  BP: (111-133)/(59-97) 111/97  SpO2:  [91 %-93 %] 93 %    Physical Exam:   BP (!) 111/97 (BP Location: Right arm)   Pulse 63   Temp 98.4 °F (36.9 °C) (Oral)   Resp 18   Ht 6' 2\" (1.88 m)   Wt 180 lb (81.6 kg)   SpO2 93%   BMI 23.11 kg/m²      General: No acute distress, awake and alert  Respiratory: Diminished bilaterally  Cardiovascular: S1, S2, regular rate and rhythm  CNS: Awake alert nonfocal  Abdomen: Soft, Non-tender, non-distended, positive bowel sounds  Extremities: No pedal edema    Diagnostic Data:    Labs:  Recent Labs   Lab 23  0748 23  1555 23  0531   WBC 13.8* 9.9 8.7   HGB 11.1* 10.9* 10.4*   MCV 97.5 96.8 97.4   .0 278.0 244.0     Recent Labs   Lab 23  0748 23  1556 23  0531 23  0510   * 112* 111*  --    BUN 10 13 16  --    CREATSERUM 0.55* 0.59* 0.53*  --    CA 9.2 8.7 8.9  --    ALB 2.8* 2.8* 2.7*  --     139 139  --    K 4.0 3.6 3.4* 3.4*    107 108  --    CO2 27.0 28.0 27.0  --    ALKPHO 118*  105 106  --    AST 22 19 19  --    ALT 28 26 22  --    BILT 0.4 0.3 0.3  --    TP 7.5 7.4 7.2  --      Estimated Creatinine Clearance: 183.9 mL/min (A) (based on SCr of 0.53 mg/dL (L)).    No results for input(s): \"TROP\", \"TROPHS\", \"CK\" in the last 168 hours.    No results for input(s): \"PTP\", \"INR\" in the last 168 hours.     Microbiology  Hospital Encounter on 12/29/23   1. Blood Culture     Status: None (Preliminary result)    Collection Time: 12/29/23  3:56 PM    Specimen: Blood,peripheral   Result Value Ref Range    Blood Culture Result No Growth 2 Days N/A       Imaging: Reviewed in Epic.    Medications:    vancomycin  125 mg Oral Q6H    ipratropium-albuterol  3 mL Nebulization 6 times per day    lipase-protease-amylase (Lip-Prot-Amyl)  25,000 Units Oral TID CC    doxycycline  100 mg Oral 2 times per day    enoxaparin  40 mg Subcutaneous Daily    pantoprazole  40 mg Intravenous Q12H    fluticasone furoate-vilanterol  1 puff Inhalation Daily    piperacillin-tazobactam  3.375 g Intravenous Q8H    fluticasone propionate  1 spray Each Nare Daily    cetirizine  5 mg Oral Daily    losartan  50 mg Oral Daily    metoprolol succinate ER  50 mg Oral Daily Beta Blocker    sertraline  100 mg Oral Daily    sodium chloride (hypertonic)  3 mL Nebulization Q8H       Assessment & Plan:      #Suspected aspiration PNA, staphylococcal pneumonia  #Dyspnea on exertion with cough  #Recent COVID-19 infection,  -CT with GGO in [place] and [facility]  -IV Abx, on IV Zosyn  Along with oral doxycycline per ID  -Blood culture done on 12/29/2023 with no growth.  Sputum culture done on 12/30/2023 shows Staph aureus.  -Nebs  -Pt was Pulm clinic when he was directed to ER for further evaluation  -Seen by pulm and ID consult   -Echo in October with preserved LVEF and grade 2 DD  -SLP eval    #C diff colitis, recurrent with diarrhea  -PO vancomycin 125 mg every 6 hours  -ID consult appreciated    #COVID 12/10/23 with persistent cough/ bronchitis  -Possibly  CT changes could be related to COVID pneumonitis  -Continue breo    #Metastatic esophageal adenocarcinoma s/p gastroesophagostomy 9/2022 c/b post op anastomotic leak   #Pancreatic mass 4/23- s/p stent insertion that subsequently migrated and stent was removed 12/12/23  -GI consulted from ER, Dr. Arslan Myles had discussed with Dr. Wing who had advised to hold consult and re-eval tomorrow if patient still having symptoms. Patient typically sees Dr. Ritchie and he is not in town  -PPI and pancrealipase   -Intractable pain due to malignancy, added oral pain medication OxyContin along with IV pain medication regimen    #H/o CAD s/p CABG  #Hypertension  #HLD  -Continue home meds, PRN hydralazine     #Recent alpha hemolytic strep bacteremia 12/2023  -Completed PO augmentin 12/27/23  -Repeat blood culture done on 12/29/2023 with no growth so far.    #Depression  -Sertraline    Discussed with patient, discussed with RN.  Video swallow ordered by pulm, pending at this time.  Pulmonary plans repeat chest x-ray in a.m.  Continue to monitor the diarrhea episodes with the C. difficile, already on treatment with vancomycin, ID following      Sakina Hernandez MD      Supplementary Documentation:     Quality:  DVT Mechanical Prophylaxis:   SCDs,    DVT Pharmacologic Prophylaxis   Medication    enoxaparin (Lovenox) 40 MG/0.4ML SUBQ injection 40 mg    heparin (Porcine) 100 Units/mL lock flush 500 Units     Code Status: Full Code  Neumann: No urinary catheter in place  SHUBHAM: TBD    Discharge is dependent on: Clinical state, consultant recs  At this point Mr. Cortez is expected to be discharge to: Home    The 21st Century Cures Act makes medical notes like these available to patients in the interest of transparency. Please be advised this is a medical document. Medical documents are intended to carry relevant information, facts as evident, and the clinical opinion of the practitioner. The medical note is intended as peer to peer  communication and may appear blunt or direct. It is written in medical language and may contain abbreviations or verbiage that are unfamiliar.

## 2024-01-01 NOTE — PLAN OF CARE
Oxygen saturation 90-93% room air. None- productive cough occasionally , prn tessalon given. Still reports right side pain. Requesting prn iv dilaudid every two hrs. Plan for video swallow in the morning.   Problem: RESPIRATORY - ADULT  Goal: Achieves optimal ventilation and oxygenation  Description: INTERVENTIONS:  - Assess for changes in respiratory status  - Assess for changes in mentation and behavior  - Position to facilitate oxygenation and minimize respiratory effort  - Oxygen supplementation based on oxygen saturation or ABGs  - Provide Smoking Cessation handout, if applicable  - Encourage broncho-pulmonary hygiene including cough, deep breathe, Incentive Spirometry  - Assess the need for suctioning and perform as needed  - Assess and instruct to report SOB or any respiratory difficulty  - Respiratory Therapy support as indicated  - Manage/alleviate anxiety  - Monitor for signs/symptoms of CO2 retention  1/1/2024 0242 by Jules Wise RN  Outcome: Progressing  1/1/2024 0242 by Jules Wise RN  Outcome: Progressing  1/1/2024 0148 by Jules Wise RN  Outcome: Progressing

## 2024-01-02 ENCOUNTER — APPOINTMENT (OUTPATIENT)
Dept: GENERAL RADIOLOGY | Facility: HOSPITAL | Age: 54
End: 2024-01-02
Attending: INTERNAL MEDICINE
Payer: COMMERCIAL

## 2024-01-02 LAB
ALBUMIN SERPL-MCNC: 2.4 G/DL (ref 3.4–5)
ALBUMIN/GLOB SERPL: 0.6 {RATIO} (ref 1–2)
ALP LIVER SERPL-CCNC: 110 U/L
ALT SERPL-CCNC: 19 U/L
ANION GAP SERPL CALC-SCNC: 5 MMOL/L (ref 0–18)
AST SERPL-CCNC: 14 U/L (ref 15–37)
BASOPHILS # BLD AUTO: 0.06 X10(3) UL (ref 0–0.2)
BASOPHILS NFR BLD AUTO: 0.6 %
BILIRUB SERPL-MCNC: 0.4 MG/DL (ref 0.1–2)
BUN BLD-MCNC: 9 MG/DL (ref 9–23)
CALCIUM BLD-MCNC: 8.7 MG/DL (ref 8.5–10.1)
CHLORIDE SERPL-SCNC: 106 MMOL/L (ref 98–112)
CO2 SERPL-SCNC: 28 MMOL/L (ref 21–32)
CREAT BLD-MCNC: 0.45 MG/DL
EGFRCR SERPLBLD CKD-EPI 2021: 125 ML/MIN/1.73M2 (ref 60–?)
EOSINOPHIL # BLD AUTO: 0.26 X10(3) UL (ref 0–0.7)
EOSINOPHIL NFR BLD AUTO: 2.5 %
ERYTHROCYTE [DISTWIDTH] IN BLOOD BY AUTOMATED COUNT: 13.2 %
GLOBULIN PLAS-MCNC: 4.3 G/DL (ref 2.8–4.4)
GLUCOSE BLD-MCNC: 90 MG/DL (ref 70–99)
HCT VFR BLD AUTO: 30.8 %
HGB BLD-MCNC: 9.6 G/DL
IMM GRANULOCYTES # BLD AUTO: 0.19 X10(3) UL (ref 0–1)
IMM GRANULOCYTES NFR BLD: 1.9 %
LYMPHOCYTES # BLD AUTO: 0.62 X10(3) UL (ref 1–4)
LYMPHOCYTES NFR BLD AUTO: 6 %
MCH RBC QN AUTO: 30.1 PG (ref 26–34)
MCHC RBC AUTO-ENTMCNC: 31.2 G/DL (ref 31–37)
MCV RBC AUTO: 96.6 FL
MONOCYTES # BLD AUTO: 0.72 X10(3) UL (ref 0.1–1)
MONOCYTES NFR BLD AUTO: 7 %
NEUTROPHILS # BLD AUTO: 8.41 X10 (3) UL (ref 1.5–7.7)
NEUTROPHILS # BLD AUTO: 8.41 X10(3) UL (ref 1.5–7.7)
NEUTROPHILS NFR BLD AUTO: 82 %
OSMOLALITY SERPL CALC.SUM OF ELEC: 286 MOSM/KG (ref 275–295)
PLATELET # BLD AUTO: 246 10(3)UL (ref 150–450)
POTASSIUM SERPL-SCNC: 3.5 MMOL/L (ref 3.5–5.1)
PROCALCITONIN SERPL-MCNC: 0.34 NG/ML (ref ?–0.16)
PROT SERPL-MCNC: 6.7 G/DL (ref 6.4–8.2)
RBC # BLD AUTO: 3.19 X10(6)UL
SODIUM SERPL-SCNC: 139 MMOL/L (ref 136–145)
WBC # BLD AUTO: 10.3 X10(3) UL (ref 4–11)

## 2024-01-02 PROCEDURE — 71045 X-RAY EXAM CHEST 1 VIEW: CPT | Performed by: INTERNAL MEDICINE

## 2024-01-02 PROCEDURE — 99232 SBSQ HOSP IP/OBS MODERATE 35: CPT | Performed by: INTERNAL MEDICINE

## 2024-01-02 PROCEDURE — 74230 X-RAY XM SWLNG FUNCJ C+: CPT | Performed by: INTERNAL MEDICINE

## 2024-01-02 PROCEDURE — 99233 SBSQ HOSP IP/OBS HIGH 50: CPT | Performed by: INTERNAL MEDICINE

## 2024-01-02 RX ORDER — CALCIUM CARBONATE 500 MG/1
500 TABLET, CHEWABLE ORAL 3 TIMES DAILY PRN
Status: DISCONTINUED | OUTPATIENT
Start: 2024-01-02 | End: 2024-01-03

## 2024-01-02 RX ORDER — IPRATROPIUM BROMIDE AND ALBUTEROL SULFATE 2.5; .5 MG/3ML; MG/3ML
3 SOLUTION RESPIRATORY (INHALATION) EVERY 6 HOURS PRN
Status: DISCONTINUED | OUTPATIENT
Start: 2024-01-02 | End: 2024-01-03

## 2024-01-02 RX ORDER — CEFAZOLIN SODIUM/WATER 2 G/20 ML
2 SYRINGE (ML) INTRAVENOUS EVERY 8 HOURS
Status: DISCONTINUED | OUTPATIENT
Start: 2024-01-02 | End: 2024-01-03

## 2024-01-02 NOTE — PROGRESS NOTES
Alert and orientated x4.  Ambulatory.  No nausea.  Reports diarrhea continues.  Afebrile.  Vital signs stable.  Requesting dilaudid every 2 hours.  To have cxr and video swallow in am.

## 2024-01-02 NOTE — CM/SW NOTE
SW noted that patient has HH consult for case management. SW discussed with RN that patient is ambulatory and fully independent. There are currently no identified needs for home health services. Consult cleared and acknowledged.     SW will continue to follow for plan of care changes and remain available for any additional DC needs or concerns.     Akanksha Helms MSW, LSW  Discharge Planner   [id number]

## 2024-01-02 NOTE — PROGRESS NOTES
[facility]  Pulmonary/Critical Care/Sleep Medicine Progress note    Dontae Cortez . Patient Status:  Inpatient    10/15/1970 MRN [id number]   Location [facility] 4NW-A Attending Sakina Hernandez MD   Hosp Day # 4 PCP Adrian Horowitz MD     Chief Complaint   Patient presents with    Nausea/Vomiting/Diarrhea    Difficulty Breathing    Abdomen/Flank Pain        History of Present Illness:    Feels stronger.  Denies diarrhea today.  Able to sit up in chair and walked in the room.  Reports pain in the region of right sternum but denies any pleuritic chest pain.    .  History:  Past Medical History:   Diagnosis Date    Back problem     Belching 2022    Black stools 2022    Borderline diabetes     Dx in 2013 - HgA1C 6.2%    C. difficile diarrhea 10/23/2022    pt treated and without symptoms    Chest pain 2022    Coronary artery disease     On 13: CABG x 4 with LIMA to LAD and SVG to diagonal, OM and PDA    Decorative tattoo 2000    Depression     Esophageal cancer (HCC)     completed chemo    Esophageal reflux     Essential hypertension     Exposure to medical diagnostic radiation     last tx 2022    Frequent urination 2013    Gastroparesis     Heartburn 2022    High blood pressure     High cholesterol 2013    Found when I had quadruple bypass    History of COVID-19 10/16/2022    asymptomatic - pt was dx during a hospitalization for another diagnosis. No continued symptoms    History of stomach ulcers     Hyperlipidemia     Hyperlipidemia LDL goal < 70     Indigestion 2022    Morbid obesity with BMI of 40.0-44.9, adult (HCC)     Muscle weakness     Nontoxic multinodular goiter     Dx in 2013: pt was told that imaging showed thyroid cysts per PCP    Pancreatic cancer (HCC)     last dose 2023 is scheduled for another round 23    Peripheral vascular disease (HCC)     pt denies    Personal history of antineoplastic chemotherapy     for  esophageal cancer/completed    Personal history of antineoplastic chemotherapy     pancreatic cancer    Problems with swallowing 02/01/2022    Pulmonary nodules     Dx in 8/2013: CT chest showed small bilateral fissural-based lung nodules less than 1 cm    S/P CABG x 4     On 8/16/13: CABG x 4 with LIMA to LAD and SVG to diagonal, OM and PDA    Shortness of breath     Vomiting 02/01/2022     Past Surgical History:   Procedure Laterality Date    APPENDECTOMY      APPENDECTOMY      ARTHROSCOPY OF JOINT UNLISTED      right shoulder    CABG      On 8/16/13: CABG x 4 with LIMA to LAD and SVG to diagonal, OM and PDA    CARDIAC CATH LAB      On 8/14/2013: cardiac cath showed 3-vessel disease    OTHER SURGICAL HISTORY      1.       Laparoscopic robotic-assisted esophagogastrectomy.     Family History   Problem Relation Age of Onset    Cancer Mother         breast and colon     Diabetes Neg       reports that he quit smoking about 10 years ago. His smoking use included cigarettes. He has never used smokeless tobacco. He reports that he does not currently use alcohol. He reports that he does not use drugs.    Allergies:  No Known Allergies    Medications:    Current Facility-Administered Medications:     oxyCODONE ER (OxyCONTIN ER) 12 hr tab 10 mg, 10 mg, Oral, Q8H    ipratropium-albuterol (Duoneb) 0.5-2.5 (3) MG/3ML inhalation solution 3 mL, 3 mL, Nebulization, QID    sodium chloride (hypertonic) 3 % nebulizer solution 3 mL, 3 mL, Nebulization, TID    ipratropium-albuterol (Duoneb) 0.5-2.5 (3) MG/3ML inhalation solution 3 mL, 3 mL, Nebulization, Q4H PRN    HYDROmorphone (Dilaudid) 1 MG/ML injection 0.4 mg, 0.4 mg, Intravenous, Q2H PRN **OR** HYDROmorphone (Dilaudid) 1 MG/ML injection 0.8 mg, 0.8 mg, Intravenous, Q2H PRN **OR** HYDROmorphone (Dilaudid) 1 MG/ML injection 1.2 mg, 1.2 mg, Intravenous, Q2H PRN    vancomycin (Vancocin) cap 125 mg, 125 mg, Oral, Q6H    pancrelipase (Lip-Prot-Amyl) (Zenpep) DR particles cap 25,000  Units, 25,000 Units, Oral, TID CC    doxycycline (Vibramycin) cap 100 mg, 100 mg, Oral, 2 times per day    sodium chloride 0.9% infusion, , Intravenous, Continuous    enoxaparin (Lovenox) 40 MG/0.4ML SUBQ injection 40 mg, 40 mg, Subcutaneous, Daily    acetaminophen (Tylenol Extra Strength) tab 500 mg, 500 mg, Oral, Q4H PRN    melatonin tab 3 mg, 3 mg, Oral, Nightly PRN    polyethylene glycol (PEG 3350) (Miralax) 17 g oral packet 17 g, 17 g, Oral, Daily PRN    sennosides (Senokot) tab 17.2 mg, 17.2 mg, Oral, Nightly PRN    bisacodyl (Dulcolax) 10 MG rectal suppository 10 mg, 10 mg, Rectal, Daily PRN    fleet enema (Fleet) 7-19 GM/118ML rectal enema 133 mL, 1 enema, Rectal, Once PRN    ondansetron (Zofran) 4 MG/2ML injection 4 mg, 4 mg, Intravenous, Q6H PRN    prochlorperazine (Compazine) 10 MG/2ML injection 5 mg, 5 mg, Intravenous, Q8H PRN    benzonatate (Tessalon) cap 200 mg, 200 mg, Oral, TID PRN    glycerin-hypromellose- (Artifical Tears) 0.2-0.2-1 % ophthalmic solution 1 drop, 1 drop, Both Eyes, QID PRN    sodium chloride (Saline Mist) 0.65 % nasal solution 1 spray, 1 spray, Each Nare, Q3H PRN    pantoprazole (Protonix) 40 mg in sodium chloride 0.9% PF 10 mL IV push, 40 mg, Intravenous, Q12H    fluticasone furoate-vilanterol (Breo Ellipta) 100-25 MCG/ACT inhaler 1 puff, 1 puff, Inhalation, Daily    piperacillin-tazobactam (Zosyn) 3.375 g in dextrose 5% 100 mL IVPB-ADDV, 3.375 g, Intravenous, Q8H    guaiFENesin-codeine (Robitussin AC) 100-10 MG/5ML oral solution 5 mL, 5 mL, Oral, Q4H PRN    heparin (Porcine) 100 Units/mL lock flush 500 Units, 5 mL, Intravenous, PRN    fluticasone propionate (Flonase) 50 MCG/ACT nasal suspension 1 spray, 1 spray, Each Nare, Daily    baclofen (Lioresal) tab 10 mg, 10 mg, Oral, TID PRN    cetirizine (ZyrTEC) tab 5 mg, 5 mg, Oral, Daily    losartan (Cozaar) tab 50 mg, 50 mg, Oral, Daily    metoprolol succinate ER (Toprol XL) 24 hr tab 50 mg, 50 mg, Oral, Daily Beta Blocker     sertraline (Zoloft) tab 100 mg, 100 mg, Oral, Daily    sucralfate (Carafate) tab 1 g, 1 g, Oral, TID PRN    diphenhydrAMINE (Benadryl) 50 mg/mL  injection 25 mg, 25 mg, Intravenous, Q8H PRN    hydrALAzine (Apresoline) 20 mg/mL injection 5 mg, 5 mg, Intravenous, Q6H PRN    Intake/Output:    Intake/Output Summary (Last 24 hours) at 1/2/2024 0947  Last data filed at 1/2/2024 0500  Gross per 24 hour   Intake 1900 ml   Output 350 ml   Net 1550 ml          Review of Systems    Review of Systems: A comprehensive 10 point review of systems was completed.  Pertinent positives and negatives noted in the the HPI.         Patient Vitals for the past 24 hrs:   BP Temp Temp src Pulse Resp SpO2   01/01/24 0651 133/59 98.6 °F (37 °C) Oral 67 14 91 %   01/01/24 0149 -- 98.1 °F (36.7 °C) Oral -- -- --   12/31/23 2005 120/61 98.9 °F (37.2 °C) Oral 73 16 92 %   12/31/23 1100 130/63 98.7 °F (37.1 °C) Oral 67 18 96 %     Vitals:    01/01/24 0830 01/01/24 1700 01/01/24 2130 01/02/24 0315   BP: (!) 111/97 130/56 127/64 125/79   BP Location: Right arm Right arm Right arm Right arm   Pulse: 63 67 75 66   Resp: 18 18 16 18   Temp: 98.4 °F (36.9 °C) 98.1 °F (36.7 °C) 98.9 °F (37.2 °C) 98.2 °F (36.8 °C)   TempSrc: Oral Oral Temporal Temporal   SpO2: 93% 93% 92% 90%   Weight:       Height:          Body mass index is 23.11 kg/m².     Physical Exam  Constitutional:       General: He is not in acute distress.     Appearance: Normal appearance. He is not ill-appearing or diaphoretic.   HENT:      Head: Normocephalic and atraumatic.      Nose: Nose normal. No congestion or rhinorrhea.      Mouth/Throat:      Mouth: Mucous membranes are moist.      Pharynx: Oropharynx is clear. No oropharyngeal exudate or posterior oropharyngeal erythema.   Eyes:      Extraocular Movements: Extraocular movements intact.      Pupils: Pupils are equal, round, and reactive to light.   Cardiovascular:      Rate and Rhythm: Normal rate.      Pulses: Normal pulses.       Heart sounds: Normal heart sounds. No murmur heard.  Pulmonary:      Effort: Pulmonary effort is normal. No respiratory distress.      Breath sounds: Normal breath sounds. No wheezing or rhonchi.      Comments: Diminished breath sounds bilateral lower lobes otherwise clear to auscultation  Right sternal area with bulging wire from previous sternotomy with area of tenderness though no erythema or warmth noted.  Chest:      Chest wall: No tenderness.   Abdominal:      General: Abdomen is flat. Bowel sounds are normal.      Palpations: Abdomen is soft.   Musculoskeletal:         General: Normal range of motion.   Skin:     General: Skin is warm.   Neurological:      General: No focal deficit present.      Mental Status: He is alert and oriented to person, place, and time.   Psychiatric:         Mood and Affect: Mood normal.         Behavior: Behavior normal.         Thought Content: Thought content normal.         Judgment: Judgment normal.         Recent Labs   Lab 12/29/23  1555 12/30/23  0531 01/02/24  0507   WBC 9.9 8.7 10.3   HGB 10.9* 10.4* 9.6*   HCT 33.8* 33.5* 30.8*   .0 244.0 246.0       Recent Labs   Lab 12/29/23  1556 12/30/23  0531 12/31/23  0510 01/02/24  0507    139  --  139   K 3.6 3.4* 3.4* 3.5    108  --  106   CO2 28.0 27.0  --  28.0   BUN 13 16  --  9   CREATSERUM 0.59* 0.53*  --  0.45*   CA 8.7 8.9  --  8.7   ALB 2.8* 2.7*  --  2.4*   ALKPHO 105 106  --  110   ALT 26 22  --  19   AST 19 19  --  14*   * 111*  --  90       No results for input(s): \"MG\" in the last 168 hours.    Lab Results   Component Value Date    PHOS 3.5 11/27/2023        No results for input(s): \"PT\", \"INR\", \"PTT\" in the last 168 hours.    No results for input(s): \"ABGPHT\", \"TIRQIB9R\", \"AEHZE3K\", \"ABGHCO3\", \"ABGBE\", \"TEMP\", \"TACOS\", \"SITE\", \"DEV\", \"THGB\" in the last 168 hours.    No results for input(s): \"TROP\", \"CKMB\" in the last 168 hours.      Cultures:   Hospital Encounter on 12/29/23   1. Blood  Culture     Status: None (Preliminary result)    Collection Time: 12/29/23  3:56 PM    Specimen: Blood,peripheral   Result Value Ref Range    Blood Culture Result No Growth 3 Days N/A              Component  Ref Range & Units 12/31/23 0510   Procalcitonin  <0.16 ng/mL 0.23 High             Component  Ref Range & Units 1/2/24 0507   Procalcitonin  <0.16 ng/mL 0.34 High         Radiology personally reviewed:  XR VIDEO SWALLOW (CPT=74230)    Result Date: 1/2/2024  PROCEDURE:  XR VIDEO SWALLOW (CPT=74230)  TECHNIQUE:  Video fluoroscopic swallowing study was performed in cooperation with the speech pathologist.  Barium of varying consistencies was administered orally with patient in lateral projection.  COMPARISON:  [facility] , XR, XR VIDEO SWALLOW (CPT=74230), 11/18/2022, 2:09 PM.  INDICATIONS:  Dysphagia  PATIENT STATED HISTORY: (As transcribed by Technologist)  patient states history of trouble swallowing and coughing while eating and drinking. EGD, esophageal stent removed, and UGI test done within the last few months without improvement to symptoms.   CINE CAPTURES:  7 FLUORSCOPY TIME:  1 minute 45 seconds RADIATION DOSE (AIR KERMA PRODUCT):  65.1 uGy/m2   FINDINGS:  No penetration or aspiration was seen with thin liquids, honey, nectar, purees, or solids.  PLEASE SEE SPEECH PATHOLOGIST REPORT FOR FULL ASSESSMENT OF SWALLOWING MECHANISM.   LOCATION:  [facility]    Dictated by (CST): Yahir To MD on 1/02/2024 at 9:18 AM     Finalized by (CST): Yahir To MD on 1/02/2024 at 9:19 AM       XR CHEST AP PORTABLE  (CPT=71045)    Result Date: 1/2/2024  CONCLUSION:  Bilateral interstitial infiltrates with interval worsening infiltrates in the left lung compared to the previous exam from 12/29/2023.  Differential considerations include atypical pneumonia and other inflammatory interstitial lung disease. 2. Cardiomegaly without edema.   LOCATION:  [facility]      Dictated by (CST): Cuong Wan MD on 1/02/2024 at 7:57 AM      Finalized by (CST): Cuong Wan MD on 1/02/2024 at 8:01 AM         Patient Active Problem List   Diagnosis    Primary hypertension    Hyperlipidemia with target low density lipoprotein (LDL) cholesterol less than 70 mg/dL    S/P CABG x 4    Nontoxic multinodular goiter    Pulmonary nodules    Thrombophlebitis leg    BRANDAN (generalized anxiety disorder)    Right shoulder Arthroscopy acromioplasty ,distal  clavicle resection, rotator cuff/labral debridment  Global 05/13/2021    Right bicipital tenosynovitis    Malignant neoplasm of esophagus (HCC)    Esophageal anastomotic leak    Esophageal obstruction    On total parenteral nutrition (TPN)    Mechanical complication of esophagostomy (HCC)    Abdominal pain of unknown etiology    Migration of esophageal stent    Gastroparesis    Aspiration pneumonia of both lungs due to gastric secretions (HCC)    Esophageal carcinoma (HCC)    Normocytic anemia    Esophageal fistula    Postnasal drip    Acute cough    Acquired bronchoesophageal fistula (HCC)    Pneumonia of both lower lobes due to infectious organism    Malignant neoplasm of pancreas (HCC)    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 difficile colitis     Assessment:  Sepsis secondary to C diff positive and COVID positive.  Left lung infiltrate noted, which is new compared to CT from 12/16, aspiration versus HCAP; CT Chest 12/30/2023 : increasing ground-glass opacities throughout the left upper lobe as well as to a lesser degree within the left lower lobe.  This is seen over a background of reticular nodular opacities that are diffusely seen throughout  the lower lobes and right middle lobe.  Procalcitonin minimally elevated, strep pneumonia and Legionella urinary antigen negative.  Histoplasma galactomannan and Blastomyces dermatitidis antigen pending  C Diff positive diarrhea: Resolving  COVID +   Possible HCAP  Right upper chest next to sternum tender area consistent with musculoskeletal pain no erythema warmth noted in the area.  Recurrent bronchitis: Suspect due to aspiration: Clinically improving  Esophageal cancer c/b post op anasthomatoc leak  Dysphagia: Video swallow study by speech pathology did not show dysphagia  Pancreatic mass s/p stent insertion that was removed on 12/12/23      Plan:    Antibiotics per infectious disease  ID follow-up  Continue Breo Ellipta 100-25 mcg 1 puff daily  Change DuoNebs 4 times daily as needed  If cough remains persistent, may consider bronchoscopy for endobronchial survey  Discharge planning    Dusty Brooks MD    Thank You for allowing me to participate in this patient's care     Dusty Brooks MD

## 2024-01-02 NOTE — PROGRESS NOTES
INFECTIOUS DISEASE  PROGRESS NOTE            [facility]    Dontae Goodwin Diego Champion Patient Status:  Inpatient    10/15/1970 MRN [id number]   Formerly [facility] 4NW-A Attending Sakina Hernandez MD   Hosp Day # 4 PCP Adrian Horowitz MD     Antibiotics: PO vanc#3  Zosyn#[id number]      Subjective:  : productive cough  Last bm yesterday    Objective:  Temp:  [98.1 °F (36.7 °C)-98.9 °F (37.2 °C)] 98.2 °F (36.8 °C)  Pulse:  [66-75] 66  Resp:  [16-18] 18  BP: (125-130)/(56-79) 125/79  SpO2:  [90 %-93 %] 90 %    General: awake alert  Vital signs:Temp:  [98.1 °F (36.7 °C)-98.9 °F (37.2 °C)] 98.2 °F (36.8 °C)  Pulse:  [66-75] 66  Resp:  [16-18] 18  BP: (125-130)/(56-79) 125/79  SpO2:  [90 %-93 %] 90 %  HEENT: Moist mucous membranes. Extraocular muscles are intact.  Neck: supple no masses  Respiratory: Non labored, symmetric excursion, normal respirations  Cardiovascular: no irregularities in rhythm  Abdomen: Soft, nontender, nondistended.   Musculoskeletal: joints: no swelling   Integument: No lesions. No erythema. No open wounds.  Labs:     COVID-19 Lab Results    COVID-19  Lab Results   Component Value Date    COVID19 Not Detected 2023    COVID19 Detected (A) 2023    COVID19 Detected (A) 12/10/2023       Pro-Calcitonin  Recent Labs   Lab 23  0531 23  0510 24  0507   PCT 0.20* 0.23* 0.34*       Cardiac  No results for input(s): \"TROP\", \"PBNP\" in the last 168 hours.    Creatinine Kinase  No results for input(s): \"CK\" in the last 168 hours.    Inflammatory Markers  No results for input(s): \"CRP\", \"MYAH\", \"LDH\", \"DDIMER\" in the last 168 hours.    Recent Labs   Lab 24  0507   RBC 3.19*   HGB 9.6*   HCT 30.8*   MCV 96.6   MCH 30.1   MCHC 31.2   RDW 13.2   NEPRELIM 8.41*   WBC 10.3   .0         Recent Labs   Lab 23  1556 23  0531 23  0510 24  0507   * 111*  --  90   BUN 13 16  --  9   CREATSERUM 0.59* 0.53*  --  0.45*   CA 8.7 8.9  --  8.7   ALB 2.8*  2.7*  --  2.4*    139  --  139   K 3.6 3.4* 3.4* 3.5    108  --  106   CO2 28.0 27.0  --  28.0   ALKPHO 105 106  --  110   AST 19 19  --  14*   ALT 26 22  --  19   BILT 0.3 0.3  --  0.4   TP 7.4 7.2  --  6.7       No results found for: \"VANCT\"  Microbiology    Hospital Encounter on 12/29/23   1. Blood Culture     Status: None (Preliminary result)    Collection Time: 12/29/23  3:56 PM    Specimen: Blood,peripheral   Result Value Ref Range    Blood Culture Result No Growth 3 Days N/A         Radiology  CT reviewed      Problem list reviewed:  Patient Active Problem List   Diagnosis    Primary hypertension    Hyperlipidemia with target low density lipoprotein (LDL) cholesterol less than 70 mg/dL    S/P CABG x 4    Nontoxic multinodular goiter    Pulmonary nodules    Thrombophlebitis leg    BRANDAN (generalized anxiety disorder)    Right shoulder Arthroscopy acromioplasty ,distal  clavicle resection, rotator cuff/labral debridment  Global 05/13/2021    Right bicipital tenosynovitis    Malignant neoplasm of esophagus (HCC)    Esophageal anastomotic leak    Esophageal obstruction    On total parenteral nutrition (TPN)    Mechanical complication of esophagostomy (HCC)    Abdominal pain of unknown etiology    Migration of esophageal stent    Gastroparesis    Aspiration pneumonia of both lungs due to gastric secretions (HCC)    Esophageal carcinoma (HCC)    Normocytic anemia    Esophageal fistula    Postnasal drip    Acute cough    Acquired bronchoesophageal fistula (HCC)    Pneumonia of both lower lobes due to infectious organism    Malignant neoplasm of pancreas (HCC)    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 difficile colitis             ASSESSMENT/PLAN:  1.  Staph aureus isolated from sputum culture  -recent COVID infection  -aspiration risk gastric outlet obstruction  Zosyn replaced with cefazolin    2. CDIFF, diarrhea improved toleratin PO vancomycin    3. Gastric adenocarcinoma sp resection      Conner Bell MD, MD  [facility]  [phone]

## 2024-01-02 NOTE — PROGRESS NOTES
[facility]     Hospitalist Progress Note     Dontae Buddy Ortegacristina Champion Patient Status:  Inpatient    10/15/1970 MRN [id number]   Location [facility] 4NW-A Attending Jose Roberto Myles,    Hosp Day # 4 PCP Adrian Horowitz MD     Chief Complaint: Malaise, chills    Subjective:     Patient reports subjective cough, has some shortness of breath, has some pleuritic chest pain on the right.  Afebrile today. diarrhea improved-none today according to patient,  on oral vancomycin for C. difficile.  Complains of right-sided pleuritic chest pain, patient states current pain medications not lasting long enough to control the pain and occasionally has 10 out of 10 pain.    Objective:    Review of Systems:   A comprehensive review of systems was completed; pertinent positive and negatives stated in subjective.    Vital signs:  Temp:  [97.7 °F (36.5 °C)-98.9 °F (37.2 °C)] 97.7 °F (36.5 °C)  Pulse:  [61-75] 61  Resp:  [16-20] 20  BP: (116-130)/(54-79) 116/54  SpO2:  [90 %-93 %] 93 %    Physical Exam:   /54 (BP Location: Right arm)   Pulse 61   Temp 97.7 °F (36.5 °C) (Oral)   Resp 20   Ht 6' 2\" (1.88 m)   Wt 180 lb (81.6 kg)   SpO2 93%   BMI 23.11 kg/m²      General: No acute distress, awake and alert  Respiratory: Diminished bilaterally  Cardiovascular: S1, S2, regular rate and rhythm  CNS: Awake alert nonfocal  Abdomen: Soft, Non-tender, non-distended, positive bowel sounds  Extremities: No pedal edema    Diagnostic Data:    Labs:  Recent Labs   Lab 23  1555 23  0531 24  0507   WBC 9.9 8.7 10.3   HGB 10.9* 10.4* 9.6*   MCV 96.8 97.4 96.6   .0 244.0 246.0     Recent Labs   Lab 23  1556 23  0531 23  0510 24  0507   * 111*  --  90   BUN 13 16  --  9   CREATSERUM 0.59* 0.53*  --  0.45*   CA 8.7 8.9  --  8.7   ALB 2.8* 2.7*  --  2.4*    139  --  139   K 3.6 3.4* 3.4* 3.5    108  --  106   CO2 28.0 27.0  --  28.0   ALKPHO 105 106  --  110   AST 19 19   --  14*   ALT 26 22  --  19   BILT 0.3 0.3  --  0.4   TP 7.4 7.2  --  6.7     Estimated Creatinine Clearance: 216.6 mL/min (A) (based on SCr of 0.45 mg/dL (L)).    No results for input(s): \"TROP\", \"TROPHS\", \"CK\" in the last 168 hours.    No results for input(s): \"PTP\", \"INR\" in the last 168 hours.     Microbiology  Hospital Encounter on 12/29/23   1. Blood Culture     Status: None (Preliminary result)    Collection Time: 12/29/23  3:56 PM    Specimen: Blood,peripheral   Result Value Ref Range    Blood Culture Result No Growth 3 Days N/A       Imaging: Reviewed in Epic.    Medications:    ceFAZolin  2 g Intravenous Q8H    oxyCODONE ER  10 mg Oral Q8H    ipratropium-albuterol  3 mL Nebulization QID    sodium chloride (hypertonic)  3 mL Nebulization TID    vancomycin  125 mg Oral Q6H    lipase-protease-amylase (Lip-Prot-Amyl)  25,000 Units Oral TID CC    doxycycline  100 mg Oral 2 times per day    enoxaparin  40 mg Subcutaneous Daily    pantoprazole  40 mg Intravenous Q12H    fluticasone furoate-vilanterol  1 puff Inhalation Daily    fluticasone propionate  1 spray Each Nare Daily    cetirizine  5 mg Oral Daily    losartan  50 mg Oral Daily    metoprolol succinate ER  50 mg Oral Daily Beta Blocker    sertraline  100 mg Oral Daily       Assessment & Plan:      #Suspected aspiration PNA, staphylococcal pneumonia  #Dyspnea on exertion with cough  #Recent COVID-19 infection,  -CT with GGO in [place] and [place]  -IV Abx, was on IV Zosyn  Along with oral doxycycline per ID-IV antibiotics changed to IV ancef Along with oral doxycyclineby ID today  -Blood culture done on 12/29/2023 with no growth.  Sputum culture done on 12/30/2023 shows Staph aureus.  -Nebs  -Pt was Pulm clinic when he was directed to ER for further evaluation  -Seen by pulm and ID consult   -Echo in October with preserved LVEF and grade 2 DD  -SLP eval  -Video swallow  with No penetration or aspiration was seen with thin liquids, honey, nectar, purees, or solids .      -repeat chest x-ray today showed  Bilateral interstitial infiltrates with interval worsening infiltrates in the left lung compared to the previous exam from 12/29/2023.  Differential considerations include atypical pneumonia and other inflammatory interstitial lung disease.  Cardiomegaly without edema .      #C diff colitis, recurrent with diarrhea  -PO vancomycin 125 mg every 6 hours  -ID consult appreciated    #COVID 12/10/23 with persistent cough/ bronchitis  -Possibly CT changes could be related to COVID pneumonitis  -Continue breo    #Metastatic esophageal adenocarcinoma s/p gastroesophagostomy 9/2022 c/b post op anastomotic leak   #Pancreatic mass 4/23- s/p stent insertion that subsequently migrated and stent was removed 12/12/23  -GI consulted from ER, Dr. Arslan Myles had discussed with Dr. Wing who had advised to hold consult and re-eval tomorrow if patient still having symptoms. Patient typically sees Dr. Ritchie and he is not in town  -PPI and pancrealipase   -Intractable pain due to malignancy, added oral pain medication OxyContin along with IV pain medication regimen    #H/o CAD s/p CABG  #Hypertension  #HLD  -Continue home meds, PRN hydralazine     #Recent alpha hemolytic strep bacteremia 12/2023  -Completed PO augmentin 12/27/23  -Repeat blood culture done on 12/29/2023 with no growth so far.    #Depression  -Sertraline    Discussed with patient, discussed with RN.  Video swallow  with No penetration or aspiration was seen with thin liquids, honey, nectar, purees, or solids .     repeat chest x-ray today showed  Bilateral interstitial infiltrates with interval worsening infiltrates in the left lung compared to the previous exam from 12/29/2023.  Differential considerations include atypical pneumonia and other inflammatory interstitial lung disease.  Cardiomegaly without edema .  IV antibiotics changed to IV ancef by ID today    Dr Arshad will follow from tomorrow        Sakina Hernandez  MD      Supplementary Documentation:     Quality:  DVT Mechanical Prophylaxis:   SCDs,    DVT Pharmacologic Prophylaxis   Medication    enoxaparin (Lovenox) 40 MG/0.4ML SUBQ injection 40 mg    heparin (Porcine) 100 Units/mL lock flush 500 Units     Code Status: Full Code  Neumann: No urinary catheter in place  SHUBHAM: TBD    Discharge is dependent on: Clinical state, consultant recs  At this point Mr. Cortez is expected to be discharge to: Home    The 21st Century Cures Act makes medical notes like these available to patients in the interest of transparency. Please be advised this is a medical document. Medical documents are intended to carry relevant information, facts as evident, and the clinical opinion of the practitioner. The medical note is intended as peer to peer communication and may appear blunt or direct. It is written in medical language and may contain abbreviations or verbiage that are unfamiliar.

## 2024-01-02 NOTE — SLP NOTE
ADULT VIDEOFLUOROSCOPIC SWALLOWING STUDY    Admission Date: 12/29/2023  Evaluation Date: 01/02/24  Radiologist: Dr. To    RECOMMENDATIONS   Diet Recommendations - Solids: Regular  Diet Recommendations - Liquids: Thin Liquids (option for mildly thick for comfort)    Further Follow-up:  Follow Up Needed (Documentation Required): No  SLP Follow-up Date: 01/01/24  Compensatory Strategies Recommended: Slow rate;Small bites and sips  Aspiration Precautions: Upright position;Slow rate;Small bites and sips  Medication Administration Recommendations: Whole in puree  Treatment Plan/Recommendations: No further inpatient SLP service warranted    HISTORY   Background/Objective Information:  Pt is a 52 y/o male seen for VFSS to r/o aspiration. Pt previously seen for dysphagia treatment 12/31/23 with recommendations for regular solids and honey thick liquids. Pt consumes a regular diet and thin liquids at baseline.   Problem List  Principal Problem:    HCAP (healthcare-associated pneumonia)  Active Problems:    Primary hypertension    Malignant neoplasm of esophagus (HCC)    Diarrhea, unspecified type    C. difficile colitis      Past Medical History  Past Medical History:   Diagnosis Date    Back problem     Belching 02/01/2022    Black stools 02/01/2022    Borderline diabetes     Dx in 8/2013 - HgA1C 6.2%    C. difficile diarrhea 10/23/2022    pt treated and without symptoms    Chest pain 02/01/2022    Coronary artery disease     On 8/16/13: CABG x 4 with LIMA to LAD and SVG to diagonal, OM and PDA    Decorative tattoo 01/01/2000    Depression     Esophageal cancer (HCC)     completed chemo    Esophageal reflux     Essential hypertension     Exposure to medical diagnostic radiation     last tx 8/18/2022    Frequent urination 01/01/2013    Gastroparesis     Heartburn 02/01/2022    High blood pressure     High cholesterol 08/01/2013    Found when I had quadruple bypass    History of COVID-19 10/16/2022    asymptomatic - pt was dx  during a hospitalization for another diagnosis. No continued symptoms    History of stomach ulcers     Hyperlipidemia     Hyperlipidemia LDL goal < 70     Indigestion 02/01/2022    Morbid obesity with BMI of 40.0-44.9, adult (HCC)     Muscle weakness     Nontoxic multinodular goiter     Dx in 8/2013: pt was told that imaging showed thyroid cysts per PCP    Pancreatic cancer (HCC)     last dose 12/4/2023 is scheduled for another round 12/27/23    Peripheral vascular disease (HCC)     pt denies    Personal history of antineoplastic chemotherapy     for esophageal cancer/completed    Personal history of antineoplastic chemotherapy     pancreatic cancer    Problems with swallowing 02/01/2022    Pulmonary nodules     Dx in 8/2013: CT chest showed small bilateral fissural-based lung nodules less than 1 cm    S/P CABG x 4     On 8/16/13: CABG x 4 with LIMA to LAD and SVG to diagonal, OM and PDA    Shortness of breath     Vomiting 02/01/2022       Current Diet Consistency: Regular;Honey thick liquids/ Moderately thick  Prior Level of Function: Independent  Prior Living Situation: Home with spouse  History of Recent: Pneumonia  Precautions: Aspiration  Imaging results:   FINDINGS:    No penetration or aspiration was seen with thin liquids, honey, nectar, purees, or solids.      PLEASE SEE SPEECH PATHOLOGIST REPORT FOR FULL ASSESSMENT OF SWALLOWING MECHANISM.         LOCATION:  [place]            Dictated by (CST): Yahir To MD on 1/02/2024 at 9:18 AM       Finalized by (CST): Yahir To MD on 1/02/2024 at 9:19 AM       Reason for Referral: R/O aspiration      Family/Patient Goals:  N/a    ASSESSMENT   DYSPHAGIA ASSESSMENT  Test completed in conjunction with Radiologist.  Patient Positioned: Upright;Reclined.  Patient Viewed: Lateral.  Patient Alertness: Fully alert.  Consistencies Presented: Thin liquids;Nectar thick liquids/ Mildly thick;Honey thick liquids/ Moderately thick;Puree;Hard solid to assess oropharyngeal swallow  function and assess for compensatory strategies to improve safe swallow function.    THIN LIQUIDS  Method of Presentation: Cup;Straw  Oral Phase of Swallow (VFSS - Thin Liquids): Within Functional Limits  Triggered at: Base of tongue;Valleculae  Premature Spillage to: Valleculae  Laryngeal Penetration: None  Tracheal Aspiration: None  Cough/Throat Clear Response: Yes  NECTAR THICK LIQUIDS/ MILDLY THICK  Method of Presentation: Cup  Oral Phase of Swallow (VFSS - Nectar Thick Liquids): Within Functional Limits  Triggered at: Base of tongue  Laryngeal Penetration: None  Tracheal Aspiration: None  HONEY THICK LIQUIDS/ MODERATELY THICK   Method of Presentation: Teaspoon  Oral Phase of Swallow (VFSS - Honey Thick Liquids): Within Functional Limits  Triggered at: Base of tongue  Laryngeal Penetration: None  Tracheal Aspiration: None  PUREE  Oral Phase of Swallow (VFSS - Puree): Within Functional Limits  Triggered at: Base of tongue  Laryngeal Penetration: None  Tracheal Aspiration: None     HARD SOLID  Oral Phase of Swallow (VFSS - Hard Solid): Within Functional Limits  Triggered at: Base of tongue  Laryngeal Penetration: None  Tracheal Aspiration: None  Penetration Aspiration Scale Score: Score 1: Material does not enter airway       Overall Impression:   Pt presented with an intact oropharyngeal swallow function. Pt participated in trials of regular solids, pureed solids, honey thick liquids, nectar thick liquids, and thin liquids. Bolus acceptance was intact w/o evidence of anterior bolus loss. Mastication and AP transit were thorough and efficient. Pharyngeal swallow initiation was timely. Trace loss to the valleculae observed prior to swallow initiation with final trial of thin liquids via straw. BOT retraction, epiglottic inversion, and hyolaryngeal excursion were WFL. No penetration or aspiration was visualized. Pt demonstrated productive cough following all thin liquid trials despite absence of  penetration/aspiration    Pt appears safe to consume a regular diet and thin liquids. Discussed option for nectar thick liquids to increase pt comfort. Pt stated he would prefer to continue with thin liquids at this time, consider nectar thick liquids if needed. General safe swallowing precautions recommended. Education provided re: results and recommendations. No further SLP treatment is warranted. SLP available for consult upon request.               GOALS  Goal #1 The patient will tolerate regular consistency and honey thick liquids without overt signs or symptoms of aspiration with 100% accuracy over 3 session(s).   Discharge   Goal #2 The patient/family/caregiver will demonstrate understanding and implementation of aspiration precautions and swallow strategies independently over 1-2 session(s).     Met   Goal #3 The patient will participate in VFSS to further assess oropharyngeal swallow function and to inform dysphagia treatment plan.      Met     EDUCATION/INSTRUCTION  Reviewed results and recommendations with patient/family/caregiver.  Agreement/Understanding verbalized and all questions answered to their apparent satisfaction.    INTERDISCIPLINARY COMMUNICATION  Reviewed results with Radiologist; agreement verbalized.    Patient Experiencing Pain: Yes  Pain Rating: 10  Pain Location: right flank  Pain Control: PO meds  Nursing Aware of Pain?: Yes    FOLLOW UP  Treatment Plan/Recommendations: No further inpatient SLP service warranted  Number of Visits to Meet Established Goals: 5        Thank you for your referral.   If you have any questions, please contact Sydnee CORDON SLP

## 2024-01-03 ENCOUNTER — APPOINTMENT (OUTPATIENT)
Dept: HEMATOLOGY/ONCOLOGY | Facility: HOSPITAL | Age: 54
End: 2024-01-03
Attending: INTERNAL MEDICINE
Payer: COMMERCIAL

## 2024-01-03 VITALS
DIASTOLIC BLOOD PRESSURE: 76 MMHG | HEART RATE: 56 BPM | BODY MASS INDEX: 23.1 KG/M2 | WEIGHT: 180 LBS | RESPIRATION RATE: 18 BRPM | OXYGEN SATURATION: 92 % | TEMPERATURE: 98 F | SYSTOLIC BLOOD PRESSURE: 141 MMHG | HEIGHT: 74 IN

## 2024-01-03 LAB
ANION GAP SERPL CALC-SCNC: 5 MMOL/L (ref 0–18)
BASOPHILS # BLD AUTO: 0.05 X10(3) UL (ref 0–0.2)
BASOPHILS NFR BLD AUTO: 0.6 %
BUN BLD-MCNC: 6 MG/DL (ref 9–23)
CALCIUM BLD-MCNC: 8.2 MG/DL (ref 8.5–10.1)
CHLORIDE SERPL-SCNC: 107 MMOL/L (ref 98–112)
CO2 SERPL-SCNC: 28 MMOL/L (ref 21–32)
CREAT BLD-MCNC: 0.35 MG/DL
EGFRCR SERPLBLD CKD-EPI 2021: 135 ML/MIN/1.73M2 (ref 60–?)
EOSINOPHIL # BLD AUTO: 0.26 X10(3) UL (ref 0–0.7)
EOSINOPHIL NFR BLD AUTO: 3.2 %
ERYTHROCYTE [DISTWIDTH] IN BLOOD BY AUTOMATED COUNT: 13.2 %
GLUCOSE BLD-MCNC: 71 MG/DL (ref 70–99)
HCT VFR BLD AUTO: 28.2 %
HGB BLD-MCNC: 8.8 G/DL
IMM GRANULOCYTES # BLD AUTO: 0.27 X10(3) UL (ref 0–1)
IMM GRANULOCYTES NFR BLD: 3.3 %
LYMPHOCYTES # BLD AUTO: 0.57 X10(3) UL (ref 1–4)
LYMPHOCYTES NFR BLD AUTO: 7.1 %
MCH RBC QN AUTO: 30.1 PG (ref 26–34)
MCHC RBC AUTO-ENTMCNC: 31.2 G/DL (ref 31–37)
MCV RBC AUTO: 96.6 FL
MONOCYTES # BLD AUTO: 0.56 X10(3) UL (ref 0.1–1)
MONOCYTES NFR BLD AUTO: 6.9 %
NEUTROPHILS # BLD AUTO: 6.36 X10 (3) UL (ref 1.5–7.7)
NEUTROPHILS # BLD AUTO: 6.36 X10(3) UL (ref 1.5–7.7)
NEUTROPHILS NFR BLD AUTO: 78.9 %
OSMOLALITY SERPL CALC.SUM OF ELEC: 286 MOSM/KG (ref 275–295)
PLATELET # BLD AUTO: 209 10(3)UL (ref 150–450)
POTASSIUM SERPL-SCNC: 3.4 MMOL/L (ref 3.5–5.1)
RBC # BLD AUTO: 2.92 X10(6)UL
SODIUM SERPL-SCNC: 140 MMOL/L (ref 136–145)
WBC # BLD AUTO: 8.1 X10(3) UL (ref 4–11)

## 2024-01-03 PROCEDURE — 99233 SBSQ HOSP IP/OBS HIGH 50: CPT | Performed by: INTERNAL MEDICINE

## 2024-01-03 PROCEDURE — 99239 HOSP IP/OBS DSCHRG MGMT >30: CPT | Performed by: HOSPITALIST

## 2024-01-03 RX ORDER — CEPHALEXIN 500 MG/1
500 CAPSULE ORAL 3 TIMES DAILY
Qty: 30 CAPSULE | Refills: 0 | Status: SHIPPED | OUTPATIENT
Start: 2024-01-03 | End: 2024-01-15 | Stop reason: ALTCHOICE

## 2024-01-03 RX ORDER — IPRATROPIUM BROMIDE AND ALBUTEROL SULFATE 2.5; .5 MG/3ML; MG/3ML
3 SOLUTION RESPIRATORY (INHALATION) EVERY 6 HOURS PRN
Qty: 120 EACH | Refills: 1 | Status: SHIPPED | OUTPATIENT
Start: 2024-01-03 | End: 2024-01-13

## 2024-01-03 RX ORDER — FLUTICASONE FUROATE AND VILANTEROL 100; 25 UG/1; UG/1
1 POWDER RESPIRATORY (INHALATION) DAILY
Qty: 1 EACH | Refills: 2 | Status: SHIPPED | OUTPATIENT
Start: 2024-01-04

## 2024-01-03 RX ORDER — VANCOMYCIN HYDROCHLORIDE 125 MG/1
CAPSULE ORAL
Qty: 70 CAPSULE | Refills: 0 | Status: SHIPPED | OUTPATIENT
Start: 2024-01-03 | End: 2024-01-15 | Stop reason: ALTCHOICE

## 2024-01-03 NOTE — PROGRESS NOTES
[facility]     Hospitalist Progress Note     Dontae Petersonmarissa Diego Champion Patient Status:  Inpatient    10/15/1970 MRN [id number]   Location [facility] 4NW-A Attending Jose Roberto Myles,    Hosp Day # 5 PCP Adrian Horowitz MD     Chief Complaint: Malaise, chills    Subjective:     Patient still with cough and chest pain, diarrhea improved.    Objective:    Review of Systems:   A comprehensive review of systems was completed; pertinent positive and negatives stated in subjective.    Vital signs:  Temp:  [97.5 °F (36.4 °C)-98 °F (36.7 °C)] 97.8 °F (36.6 °C)  Pulse:  [58-61] 60  Resp:  [20] 20  BP: (116-152)/(54-74) 150/74  SpO2:  [91 %-93 %] 91 %    Physical Exam:   /74 (BP Location: Right arm)   Pulse 60   Temp 97.8 °F (36.6 °C) (Oral)   Resp 20   Ht 6' 2\" (1.88 m)   Wt 180 lb (81.6 kg)   SpO2 91%   BMI 23.11 kg/m²      General: No acute distress, awake and alert  Respiratory: Diminished bilaterally  Cardiovascular: S1, S2, regular rate and rhythm  CNS: Awake alert nonfocal  Abdomen: Soft, Non-tender, non-distended, positive bowel sounds  Extremities: No pedal edema    Diagnostic Data:    Labs:  Recent Labs   Lab 23  1555 23  0531 24  0507 24  0607   WBC 9.9 8.7 10.3 8.1   HGB 10.9* 10.4* 9.6* 8.8*   MCV 96.8 97.4 96.6 96.6   .0 244.0 246.0 209.0     Recent Labs   Lab 23  1556 23  0531 23  0510 24  0507 24  0607   * 111*  --  90 71   BUN 13 16  --  9 6*   CREATSERUM 0.59* 0.53*  --  0.45* 0.35*   CA 8.7 8.9  --  8.7 8.2*   ALB 2.8* 2.7*  --  2.4*  --     139  --  139 140   K 3.6 3.4* 3.4* 3.5 3.4*    108  --  106 107   CO2 28.0 27.0  --  28.0 28.0   ALKPHO 105 106  --  110  --    AST 19 19  --  14*  --    ALT 26 22  --  19  --    BILT 0.3 0.3  --  0.4  --    TP 7.4 7.2  --  6.7  --      Estimated Creatinine Clearance: 278.5 mL/min (A) (based on SCr of 0.35 mg/dL (L)).    No results for input(s): \"TROP\", \"TROPHS\", \"CK\" in  the last 168 hours.    No results for input(s): \"PTP\", \"INR\" in the last 168 hours.     Microbiology  Hospital Encounter on 12/29/23   1. Blood Culture     Status: None (Preliminary result)    Collection Time: 12/29/23  3:56 PM    Specimen: Blood,peripheral   Result Value Ref Range    Blood Culture Result No Growth 4 Days N/A       Imaging: Reviewed in Epic.    Medications:    ceFAZolin  2 g Intravenous Q8H    oxyCODONE ER  10 mg Oral Q8H    vancomycin  125 mg Oral Q6H    lipase-protease-amylase (Lip-Prot-Amyl)  25,000 Units Oral TID CC    enoxaparin  40 mg Subcutaneous Daily    pantoprazole  40 mg Intravenous Q12H    fluticasone furoate-vilanterol  1 puff Inhalation Daily    fluticasone propionate  1 spray Each Nare Daily    cetirizine  5 mg Oral Daily    losartan  50 mg Oral Daily    metoprolol succinate ER  50 mg Oral Daily Beta Blocker    sertraline  100 mg Oral Daily       Assessment & Plan:      #Suspected aspiration PNA, staphylococcal aureus pneumonia  #Dyspnea on exertion with cough  #Recent COVID-19 infection,  -CT with [facility] in [place] and [place]  -IV Abx, was on IV Zosyn  Along with oral doxycycline per ID-IV antibiotics changed to IV ancef   -Blood culture done on 12/29/2023 with no growth.  Sputum culture done on 12/30/2023 shows Staph aureus.  -Nebs  -Pt was Pulm clinic when he was directed to ER for further evaluation  -Seen by pulm and ID consult   -Echo in October with preserved LVEF and grade 2 DD  -SLP eval  -Video swallow  with No penetration or aspiration was seen with thin liquids, honey, nectar, purees, or solids .     -repeat chest x-ray today reviewed      #C diff colitis, recurrent with diarrhea  -PO vancomycin 125 mg every 6 hours  -ID consult appreciated    #COVID 12/10/23 with persistent cough/ bronchitis  -Possibly CT changes could be related to COVID pneumonitis  -Continue breo    #Metastatic esophageal adenocarcinoma s/p gastroesophagostomy 9/2022 c/b post op anastomotic leak   #Pancreatic  mass 4/23- s/p stent insertion that subsequently migrated and stent was removed 12/12/23  -pt can f/u with GI as an outpt  -PPI and pancrealipase   -Intractable pain due to malignancy, added oral pain medication OxyContin along with IV pain medication regimen    #H/o CAD s/p CABG  #Hypertension  #HLD  -Continue home meds, PRN hydralazine     #Recent alpha hemolytic strep bacteremia 12/2023  -Completed PO augmentin 12/27/23  -Repeat blood culture done on 12/29/2023 with no growth so far.    #Depression  -Sertraline    Discussed with patient, discussed with RN.      Megan Arshad MD        Supplementary Documentation:     Quality:  DVT Mechanical Prophylaxis:   SCDs,    DVT Pharmacologic Prophylaxis   Medication    enoxaparin (Lovenox) 40 MG/0.4ML SUBQ injection 40 mg    heparin (Porcine) 100 Units/mL lock flush 500 Units     Code Status: Full Code  Neumann: No urinary catheter in place  SHUBHAM: TBD    Discharge is dependent on: Clinical state, consultant recs  At this point Mr. Cortez is expected to be discharge to: Home    The 21st Century Cures Act makes medical notes like these available to patients in the interest of transparency. Please be advised this is a medical document. Medical documents are intended to carry relevant information, facts as evident, and the clinical opinion of the practitioner. The medical note is intended as peer to peer communication and may appear blunt or direct. It is written in medical language and may contain abbreviations or verbiage that are unfamiliar.

## 2024-01-03 NOTE — PLAN OF CARE
Pt A/O x4. VSS. Afebrile. Pt c/o pain throughout day. Scheduled and PRN pain medication admin per MAR. Pt reports relief w/ pain medication. Medications admin per MAR. Pt reported chest pain this AM, reported this has happened in past. Pt denies any cardiac symptoms. MD notified. Tums ordered as well as CXR. Pt reports decrease in pain. Pt seen by SLP for hx of difficulty swallowing and aspiration PNA. Pt okay to have thin liquids and solid foods per SLP. Pt had swallow study completed today, results in chart. Pt on iso precautions. Fall and safety precautions in place. Call light within reach.

## 2024-01-03 NOTE — PLAN OF CARE
Patient alert and oriented x 4, lungs diminished on room air, abdomen soft, bowel sounds present, diarrhea, zofran x 1 for nausea, voids, adequate output, dilaudid IV every 2 hours, VSS, afebrile, medications per MAR.

## 2024-01-03 NOTE — PLAN OF CARE
Pt A/O x4. VSS. Afebrile. Pt c/o pain throughout day. PRN pain medication admin per MAR. Pt reports relief w/ pain medication. Medications admin per MAR. Pt positive for c. Diff, iso precautions maintained. Pt c/o coughing after drinking liquids. MD notified. Pt being seen by SLP. Pt cleared for DC. Pt in agreement w/ DC and home medications. Fall and safety precautions in place. Call light within reach.

## 2024-01-03 NOTE — PAYOR COMM NOTE
12/31, 1/1/ & 1/2  CONTINUED STAY REVIEW    Payor: [employer]  Subscriber #:  [id number]  Authorization Number: [id number]    Admit date: 12/29/23  Admit time: 10:40 PM    Admitting Physician: Sophia Gibson MD  Attending Physician:  Megan Arshad MD  Primary Care Physician: Adrian Horowitz MD         MEDICATIONS ADMINISTERED IN LAST 1 DAY:  benzonatate (Tessalon) cap 200 mg       Date Action Dose Route User    1/3/2024 0828 Given 200 mg Oral Josh Ghosh RN    1/2/2024 2330 Given 200 mg Oral Adilene Zelaya RN          calcium carbonate (Tums) chewable tab 500 mg       Date Action Dose Route User    1/2/2024 1204 Given 500 mg Oral Niurka Campos RN          ceFAZolin (Ancef) 2 g in 20mL IV syringe premix       Date Action Dose Route User    1/3/2024 0343 Given 2 g Intravenous Adilene Zelaya RN    1/2/2024 2034 Given 2 g Intravenous Adilene Zelaya RN    1/2/2024 1206 Given 2 g Intravenous Niurka Campos RN          cetirizine (ZyrTEC) tab 5 mg       Date Action Dose Route User    1/3/2024 0828 Given 5 mg Oral Josh Ghosh RN    1/2/2024 0956 Given 5 mg Oral Josh Ghosh RN          diphenhydrAMINE (Benadryl) 50 mg/mL  injection 25 mg       Date Action Dose Route User    1/3/2024 0824 Given 25 mg Intravenous Josh Ghosh RN    1/2/2024 2058 Given 25 mg Intravenous Adilene Zelaya RN    1/2/2024 1205 Given 25 mg Intravenous Niurka Campos RN          doxycycline (Vibramycin) cap 100 mg       Date Action Dose Route User    1/2/2024 2034 Given 100 mg Oral Adilene Zelaya RN    1/2/2024 0956 Given 100 mg Oral Josh Ghosh RN          enoxaparin (Lovenox) 40 MG/0.4ML SUBQ injection 40 mg       Date Action Dose Route User    1/3/2024 0829 Given 40 mg Subcutaneous (Right Lower Abdomen) Josh Ghosh RN    1/2/2024 0955 Given 40 mg Subcutaneous (Left Lower Abdomen) Josh Ghosh RN          fluticasone furoate-vilanterol (Breo Ellipta) 100-25 MCG/ACT  inhaler 1 puff       Date Action Dose Route User    1/2/2024 0930 Given 1 puff Inhalation Josh Ghosh RN          HYDROmorphone (Dilaudid) 1 MG/ML injection 1.2 mg       Date Action Dose Route User    1/3/2024 0820 Given 1.2 mg Intravenous Josh Ghosh, DANY    1/3/2024 0558 Given 1.2 mg Intravenous Adilene Zelaya RN    1/3/2024 0344 Given 1.2 mg Intravenous Adilene Zelaya RN    1/3/2024 0120 Given 1.2 mg Intravenous Adilene Zelaya RN    1/2/2024 2314 Given 1.2 mg Intravenous Adilene Zelaya RN    1/2/2024 2035 Given 1.2 mg Intravenous Adilene Zelaya RN    1/2/2024 1825 Given 1.2 mg Intravenous Josh Ghosh, DANY    1/2/2024 1611 Given 1.2 mg Intravenous Cristian Duff RN    1/2/2024 1404 Given 1.2 mg Intravenous Josh Ghosh RN    1/2/2024 1214 Given 1.2 mg Intravenous Niurka Campos RN    1/2/2024 1027 Given 1.2 mg Intravenous Josh Ghosh RN          ipratropium-albuterol (Duoneb) 0.5-2.5 (3) MG/3ML inhalation solution 3 mL       Date Action Dose Route User    1/2/2024 1141 Given 3 mL Nebulization Yohana Lilly, MURIEL          losartan (Cozaar) tab 50 mg       Date Action Dose Route User    1/3/2024 0828 Given 50 mg Oral Josh Ghosh RN    1/2/2024 0955 Given 50 mg Oral Josh Ghosh RN          metoprolol succinate ER (Toprol XL) 24 hr tab 50 mg       Date Action Dose Route User    1/3/2024 0558 Given 50 mg Oral Adilene Zelaya RN          ondansetron (Zofran) 4 MG/2ML injection 4 mg       Date Action Dose Route User    1/3/2024 0340 Given 4 mg Intravenous Adilene Zelaya RN    1/2/2024 1613 Given 4 mg Intravenous Cristina Duff, RN          oxyCODONE ER (OxyCONTIN ER) 12 hr tab 10 mg       Date Action Dose Route User    1/3/2024 0558 Given 10 mg Oral Adilene Zelaya, RN    1/2/2024 2034 Given 10 mg Oral Adilene Zelaya, RN    1/2/2024 1205 Given 10 mg Oral Niurka Campos, RN          pancrelipase (Lip-Prot-Amyl) (Zenpep) DR particles cap 25,000 Units        Date Action Dose Route User    1/3/2024 0829 Given 25,000 Units Oral Josh Ghosh RN    1/2/2024 1410 Given 25,000 Units Oral Josh Ghosh RN    1/2/2024 0955 Given 25,000 Units Oral Josh Ghosh RN          pantoprazole (Protonix) 40 mg in sodium chloride 0.9% PF 10 mL IV push       Date Action Dose Route User    1/2/2024 2314 Given 40 mg Intravenous Adilene Zelaya RN    1/2/2024 0956 Given 40 mg Intravenous Josh Ghosh RN          piperacillin-tazobactam (Zosyn) 3.375 g in dextrose 5% 100 mL IVPB-ADDV       Date Action Dose Route User    1/2/2024 0955 New Bag 3.375 g Intravenous Josh Ghosh RN          sertraline (Zoloft) tab 100 mg       Date Action Dose Route User    1/3/2024 0829 Given 100 mg Oral Josh Ghosh RN    1/2/2024 0955 Given 100 mg Oral Josh Ghosh RN          sodium chloride 0.9% infusion       Date Action Dose Route User    1/3/2024 0557 New Bag (none) Intravenous Adilene Zelaya RN          sodium chloride (hypertonic) 3 % nebulizer solution 3 mL       Date Action Dose Route User    1/2/2024 1141 Given 3 mL Nebulization Yohana Lilly, MURIEL          vancomycin (Vancocin) cap 125 mg       Date Action Dose Route User    1/3/2024 0829 Given 125 mg Oral Josh Ghosh RN    1/3/2024 0120 Given 125 mg Oral Adilene Zelaya RN    1/2/2024 2034 Given 125 mg Oral Adilene Zelaya RN    1/2/2024 1409 Given 125 mg Oral Josh Ghosh RN    1/2/2024 0955 Given 125 mg Oral Josh Ghosh RN            Vitals (last day)       Date/Time Temp Pulse Resp BP SpO2 Weight O2 Device O2 Flow Rate (L/min) [facility]    01/03/24 0351 97.8 °F (36.6 °C) -- 20 150/74 91 % -- None (Room air) -- EC    01/03/24 0351 -- 60 -- -- -- -- -- -- AK    01/02/24 2316 97.5 °F (36.4 °C) -- 20 152/69 -- -- None (Room air) -- EC    01/02/24 2155 98 °F (36.7 °C) 58 -- 140/58 93 % -- None (Room air) -- JW    01/02/24 1131 97.7 °F (36.5 °C) 61 20 116/54 93 % -- None (Room air) -- SS    01/02/24  0315 98.2 °F (36.8 °C) 66 18 125/79 90 % -- None (Room air) -- AK       12/31 HOSPITALIST NOTE       Subjective:   Patient reports subjective cough, has some shortness of breath.  Low-grade fever 99 °F.  Had diarrhea multiple episodes according to patient, has C. difficile positive which came back yesterday, on oral vancomycin    Vital signs:  Temp:  [97.7 °F (36.5 °C)-99 °F (37.2 °C)] 98.7 °F (37.1 °C)  Pulse:  [67-82] 67  Resp:  [14-18] 18  BP: (128-152)/(63-72) 130/63  SpO2:  [90 %-96 %] 96 %       Medications:    vancomycin  125 mg Oral Q6H    ipratropium-albuterol  3 mL Nebulization 6 times per day    lipase-protease-amylase (Lip-Prot-Amyl)  25,000 Units Oral TID CC    doxycycline  100 mg Oral 2 times per day    enoxaparin  40 mg Subcutaneous Daily    pantoprazole  40 mg Intravenous Q12H    fluticasone furoate-vilanterol  1 puff Inhalation Daily    piperacillin-tazobactam  3.375 g Intravenous Q8H    fluticasone propionate  1 spray Each Nare Daily    cetirizine  5 mg Oral Daily    losartan  50 mg Oral Daily    metoprolol succinate ER  50 mg Oral Daily Beta Blocker    sertraline  100 mg Oral Daily    sodium chloride (hypertonic)  3 mL Nebulization Q8H               Assessment & Plan:   #Suspected aspiration PNA  #Dyspnea on exertion with cough  #Recent COVID-19 infection,  -CT with [facility] [place] and [place]  -IV Abx  -Follow blood and sputum cultures  -Nebs  -Pt was Pulm clinic when he was directed to ER for further evaluation  -Pulm and ID consult   -Echo in October with preserved LVEF and grade 2 DD  -SLP eval     #C diff colitis, recurrent with diarrhea  -PO vancomycin 125 mg every 6 hours  -ID consult appreciated     #COVID 12/10/23 with persistent cough/ bronchitis  -Possibly CT changes could be related to COVID pneumonitis  -Continue breo     #Metastatic esophageal adenocarcinoma s/p gastroesophagostomy 9/2022 c/b post op anastomotic leak   #Pancreatic mass 4/23- s/p stent insertion that subsequently migrated and  stent was removed 12/12/23  -GI consulted from ER, Dr. Arslan Myles had discussed with Dr. Wing who had advised to hold consult and re-eval tomorrow if patient still having symptoms. Patient typically sees Dr. Ritchie and he is not in town  -PPI and pancrealipase      #H/o CAD s/p CABG  #Hypertension  #HLD  -Continue home meds, PRN hydralazine      #Recent alpha hemolytic strep bacteremia 12/2023  -Completed PO augmentin 12/27/23  -Follow repeat cultures     #Depression  -Sertraline    12/31 PULMONARY NOTE  Overnight: SLP saw patient plan for video study, still with diarrhea, cough is about the same     Objective:  /63 (BP Location: Left arm)   Pulse 67   Temp 98.7 °F (37.1 °C) (Oral)   Resp 18   Ht 6' 2\" (1.88 m)   Wt 180 lb (81.6 kg)   SpO2 96%   BMI 23.11 kg/m²               Recent Labs     12/29/23  1556 12/30/23  0531 12/31/23  0510    139  --    K 3.6 3.4* 3.4*    108  --    CO2 28.0 27.0  --    BUN 13 16  --    CREATSERUM 0.59* 0.53*  --          Assessment:  Sepsis secondary to C diff positive and COVID positive.  Left lung infiltrate noted, which is new compared to CT from 12/16, aspiration versus HCAP  C Diff positive  COVID +   Possible HCAP  Esophageal cancer c/b post op anasthomatoc leak  Pancreatic mass s/p stent insertion that was removed on 12/12/23        Plan:  ID consulted, on zosyn and PO vancomycin  Follow up noninvasive serologies  Started on breo, continue  If no improvement in breathing after about 48 hours of treatment would consider bronchoscopy     12/31 ID NOTE    Antibiotics: Zosyn #3, Doxycycline #2, PO Vancomycin #2     Subjective:  Afebrile overnight. Npo. Plans for swallow test. VRP negative. Sputum cx with polymicrobial GS, culture growing normal zeinab and staph aureus. Mrsa nares screen negative 12/30. Patient reports less abdominal pain today, comfortable in bed this morning. Still had 3-4 watery bowel movements overnight.      Objective:  Temp:  [97.7 °F  (36.5 °C)-99 °F (37.2 °C)] 98.7 °F (37.1 °C)  Pulse:  [67-82] 67  Resp:  [14-18] 18  BP: (128-152)/(63-72) 130/63  SpO2:  [90 %-96 %] 96 %         ASSESSMENT:     C diff diarrhea   Symptoms started 12/29   PCR and EIA positive   GI panel negative   Today with ongoing watery bowel movements but abdominal pain is improved   Pneumonia   + productive cough on admission  Chronically aspirating  Recent covid 12/10  CT with opacities suggestive of atypical pneumonia  On room air at this time, briefly on 2L NC overnight   Coughing up lots of sputum, Sputum less thick today  Strep pneumo ag negative, legionella negative   VRP negative  Sputum cx in process, GS polymicrobial, Cx + staph arueus (sens pending) and normal zeinab  MRSA nares negative   Recent Streptococcal bacteremia. 1/2 Bcxs + 12/16 with strep gordonii. No fevers. Suspect contaminant. S/p abx course EOT 12/27  Gastric adenocarcinoma s/p resection. Imaging without evidence of anastomotic leak.  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      PLAN:   - vancomycin 125mg PO q 6 hrs for C diff  - continue to monitor Bms / abdominal pain for clinical improvement   - continue zosyn for empiric PNA treatment  - continue doxy for atypical coverage   - follow up sputum cx - staph aureus and normal zeinab growing (sens pending, though mrsa screen pcr negative)  - monitor O2 requirements   - we will follow along     1/1 PULMONARY NOTE    History of Present Illness:  Remains on room air with oxygen saturations between 91 to 96%     Breathing okay         Patient Vitals for the past 24 hrs:    BP Temp Temp src Pulse Resp SpO2   01/01/24 0651 133/59 98.6 °F (37 °C) Oral 67 14 91 %   01/01/24 0149 -- 98.1 °F (36.7 °C) Oral -- -- --   12/31/23 2005 120/61 98.9 °F (37.2 °C) Oral 73 16 92 %   12/31/23 1100 130/63 98.7 °F (37.1 °C) Oral 67 18 96 %        Assessment:  Sepsis secondary to C diff positive and COVID positive.  Left lung infiltrate noted,  which is new compared to CT from 12/16, aspiration versus HCAP; CT Chest 12/30/2023 : increasing ground-glass opacities throughout the left upper lobe as well as to a lesser degree within the left lower lobe.  This is seen over a background of reticular nodular opacities that are diffusely seen throughout the lower lobes and right middle lobe.  Procalcitonin minimally elevated, strep pneumonia and Legionella urinary antigen negative.  Histoplasma galactomannan and Blastomyces dermatitidis antigen pending  C Diff positive: Still with intermittent diarrhea  COVID +   Possible HCAP  Recurrent bronchitis: Suspect due to aspiration  Esophageal cancer c/b post op anasthomatoc leak  Pancreatic mass s/p stent insertion that was removed on 12/12/23        Plan:  Video swallow study planned by speech pathology  Continue zosyn   PO vancomycin per infectious disease  ID follow-up  Continue Breo Ellipta 100-25 mcg 1 puff daily  If cough remains persistent, may consider bronchoscopy for endobronchial survey  Follow labs, procalcitonin and chest x-ray    1/1 HOSPITALIST NOTE    Subjective:   Patient reports subjective cough, has some shortness of breath, has some pleuritic chest pain on the right.  Afebrile today.  Had diarrhea multiple episodes again today according to patient,  on oral vancomycin for C. difficile.  Complains of right-sided pleuritic chest pain, patient states current pain medications not lasting long enough to control the pain and occasionally has 10 out of 10 pain.       Vital signs:  Temp:  [98.1 °F (36.7 °C)-98.9 °F (37.2 °C)] 98.4 °F (36.9 °C)  Pulse:  [63-73] 63  Resp:  [14-18] 18  BP: (111-133)/(59-97) 111/97  SpO2:  [91 %-93 %] 93 %      Medications:    vancomycin  125 mg Oral Q6H    ipratropium-albuterol  3 mL Nebulization 6 times per day    lipase-protease-amylase (Lip-Prot-Amyl)  25,000 Units Oral TID CC    doxycycline  100 mg Oral 2 times per day    enoxaparin  40 mg Subcutaneous Daily    pantoprazole   40 mg Intravenous Q12H    fluticasone furoate-vilanterol  1 puff Inhalation Daily    piperacillin-tazobactam  3.375 g Intravenous Q8H    fluticasone propionate  1 spray Each Nare Daily    cetirizine  5 mg Oral Daily    losartan  50 mg Oral Daily    metoprolol succinate ER  50 mg Oral Daily Beta Blocker    sertraline  100 mg Oral Daily    sodium chloride (hypertonic)  3 mL Nebulization Q8H               Assessment & Plan:   #Suspected aspiration PNA, staphylococcal pneumonia  #Dyspnea on exertion with cough  #Recent COVID-19 infection,  -CT with [facility] in [place] and [place]  -IV Abx, on IV Zosyn  Along with oral doxycycline per ID  -Blood culture done on 12/29/2023 with no growth.  Sputum culture done on 12/30/2023 shows Staph aureus.  -Nebs  -Pt was Pulm clinic when he was directed to ER for further evaluation  -Seen by pulm and ID consult   -Echo in October with preserved LVEF and grade 2 DD  -SLP eval     #C diff colitis, recurrent with diarrhea  -PO vancomycin 125 mg every 6 hours  -ID consult appreciated     #COVID 12/10/23 with persistent cough/ bronchitis  -Possibly CT changes could be related to COVID pneumonitis  -Continue breo     #Metastatic esophageal adenocarcinoma s/p gastroesophagostomy 9/2022 c/b post op anastomotic leak   #Pancreatic mass 4/23- s/p stent insertion that subsequently migrated and stent was removed 12/12/23  -GI consulted from ER, Dr. Arslan Myles had discussed with Dr. Wign who had advised to hold consult and re-eval tomorrow if patient still having symptoms. Patient typically sees Dr. Ritchie and he is not in town  -PPI and pancrealipase   -Intractable pain due to malignancy, added oral pain medication OxyContin along with IV pain medication regimen     #H/o CAD s/p CABG  #Hypertension  #HLD  -Continue home meds, PRN hydralazine      #Recent alpha hemolytic strep bacteremia 12/2023  -Completed PO augmentin 12/27/23  -Repeat blood culture done on 12/29/2023 with no growth so far.      #Depression  -Sertraline    1/2 PULMONARY NOTE    History of Present Illness:     Feels stronger.  Denies diarrhea today.  Able to sit up in chair and walked in the room.  Reports pain in the region of right sternum but denies any pleuritic chest pain.     .        Recent Labs   Lab 12/29/23  1555 12/30/23  0531 01/02/24  0507   WBC 9.9 8.7 10.3   HGB 10.9* 10.4* 9.6*   HCT 33.8* 33.5* 30.8*   .0 244.0 246.0                Recent Labs   Lab 12/29/23  1556 12/30/23  0531 12/31/23  0510 01/02/24  0507    139  --  139   K 3.6 3.4* 3.4* 3.5    108  --  106   CO2 28.0 27.0  --  28.0   BUN 13 16  --  9   CREATSERUM 0.59* 0.53*  --  0.45*   CA 8.7 8.9  --  8.7   ALB 2.8* 2.7*  --  2.4*   ALKPHO 105 106  --  110   ALT 26 22  --  19   AST 19 19  --  14*   * 111*  --  90          Radiology personally reviewed:  XR VIDEO SWALLOW (CPT=74230)     Result Date: 1/2/2024  PROCEDURE:  XR VIDEO SWALLOW (CPT=74230)  TECHNIQUE:  Video fluoroscopic swallowing study was performed in cooperation with the speech pathologist.  Barium of varying consistencies was administered orally with patient in lateral projection.  COMPARISON:  MONIQUE KING, XR VIDEO SWALLOW (CPT=74230), 11/18/2022, 2:09 PM.  INDICATIONS:  Dysphagia  PATIENT STATED HISTORY: (As transcribed by Technologist)  patient states history of trouble swallowing and coughing while eating and drinking. EGD, esophageal stent removed, and UGI test done within the last few months without improvement to symptoms.   CINE CAPTURES:  7 FLUORSCOPY TIME:  1 minute 45 seconds RADIATION DOSE (AIR KERMA PRODUCT):  65.1 uGy/m2   FINDINGS:  No penetration or aspiration was seen with thin liquids, honey, nectar, purees, or solids.  PLEASE SEE SPEECH PATHOLOGIST REPORT FOR FULL ASSESSMENT OF SWALLOWING MECHANISM.   LOCATION:  [facility]    Dictated by (CST): Yahir To MD on 1/02/2024 at 9:18 AM     Finalized by (CST): Yahir To MD on 1/02/2024 at 9:19 AM        XR CHEST AP  PORTABLE  (CPT=71045)     Result Date: 1/2/2024  CONCLUSION:  Bilateral interstitial infiltrates with interval worsening infiltrates in the left lung compared to the previous exam from 12/29/2023.  Differential considerations include atypical pneumonia and other inflammatory interstitial lung disease. 2. Cardiomegaly without edema.   LOCATION:  [facility]      Dictated by (CST): Cuong Wan MD on 1/02/2024 at 7:57 AM     Finalized by (CST): Cuong Wan MD on 1/02/2024 at 8:01 AM            Assessment:  Sepsis secondary to C diff positive and COVID positive.  Left lung infiltrate noted, which is new compared to CT from 12/16, aspiration versus HCAP; CT Chest 12/30/2023 : increasing ground-glass opacities throughout the left upper lobe as well as to a lesser degree within the left lower lobe.  This is seen over a background of reticular nodular opacities that are diffusely seen throughout the lower lobes and right middle lobe.  Procalcitonin minimally elevated, strep pneumonia and Legionella urinary antigen negative.  Histoplasma galactomannan and Blastomyces dermatitidis antigen pending  C Diff positive diarrhea: Resolving  COVID +   Possible HCAP  Right upper chest next to sternum tender area consistent with musculoskeletal pain no erythema warmth noted in the area.  Recurrent bronchitis: Suspect due to aspiration: Clinically improving  Esophageal cancer c/b post op anasthomatoc leak  Dysphagia: Video swallow study by speech pathology did not show dysphagia  Pancreatic mass s/p stent insertion that was removed on 12/12/23        Plan:     Antibiotics per infectious disease  ID follow-up  Continue Breo Ellipta 100-25 mcg 1 puff daily  Change DuoNebs 4 times daily as needed  If cough remains persistent, may consider bronchoscopy for endobronchial survey  Discharge planning    1/2 ID NOTE    Antibiotics: PO vanc#3  Zosyn#4-dc        Subjective:  : productive cough  Last bm yesterday     Objective:  Temp:   [98.1 °F (36.7 °C)-98.9 °F (37.2 °C)] 98.2 °F (36.8 °C)  Pulse:  [66-75] 66  Resp:  [16-18] 18  BP: (125-130)/(56-79) 125/79  SpO2:  [90 %-93 %] 90 %      Pro-Calcitonin        Recent Labs   Lab 12/30/23  0531 12/31/23  0510 01/02/24  0507   PCT 0.20* 0.23* 0.34*             ASSESSMENT/PLAN:  1.  Staph aureus isolated from sputum culture  -recent COVID infection  -aspiration risk gastric outlet obstruction  Zosyn replaced with cefazolin     2. CDIFF, diarrhea improved toleratin PO vancomycin     3. Gastric adenocarcinoma sp resection    1/2 HOSPITALIST NOTE    Subjective:   Patient reports subjective cough, has some shortness of breath, has some pleuritic chest pain on the right.  Afebrile today. diarrhea improved-none today according to patient,  on oral vancomycin for C. difficile.  Complains of right-sided pleuritic chest pain, patient states current pain medications not lasting long enough to control the pain and occasionally has 10 out of 10 pain.        Vital signs:  Temp:  [97.7 °F (36.5 °C)-98.9 °F (37.2 °C)] 97.7 °F (36.5 °C)  Pulse:  [61-75] 61  Resp:  [16-20] 20  BP: (116-130)/(54-79) 116/54  SpO2:  [90 %-93 %] 93 %      Medications:    ceFAZolin  2 g Intravenous Q8H    oxyCODONE ER  10 mg Oral Q8H    ipratropium-albuterol  3 mL Nebulization QID    sodium chloride (hypertonic)  3 mL Nebulization TID    vancomycin  125 mg Oral Q6H    lipase-protease-amylase (Lip-Prot-Amyl)  25,000 Units Oral TID CC    doxycycline  100 mg Oral 2 times per day    enoxaparin  40 mg Subcutaneous Daily    pantoprazole  40 mg Intravenous Q12H    fluticasone furoate-vilanterol  1 puff Inhalation Daily    fluticasone propionate  1 spray Each Nare Daily    cetirizine  5 mg Oral Daily    losartan  50 mg Oral Daily    metoprolol succinate ER  50 mg Oral Daily Beta Blocker    sertraline  100 mg Oral Daily               Assessment & Plan:   #Suspected aspiration PNA, staphylococcal pneumonia  #Dyspnea on exertion with cough  #Recent  COVID-19 infection,  -CT with [facility] in [place] and [place]  -IV Abx, was on IV Zosyn  Along with oral doxycycline per ID-IV antibiotics changed to IV ancef Along with oral doxycyclineby ID today  -Blood culture done on 12/29/2023 with no growth.  Sputum culture done on 12/30/2023 shows Staph aureus.  -Nebs  -Pt was Pulm clinic when he was directed to ER for further evaluation  -Seen by pulm and ID consult   -Echo in October with preserved LVEF and grade 2 DD  -SLP eval  -Video swallow  with No penetration or aspiration was seen with thin liquids, honey, nectar, purees, or solids .     -repeat chest x-ray today showed  Bilateral interstitial infiltrates with interval worsening infiltrates in the left lung compared to the previous exam from 12/29/2023.  Differential considerations include atypical pneumonia and other inflammatory interstitial lung disease.  Cardiomegaly without edema .        #C diff colitis, recurrent with diarrhea  -PO vancomycin 125 mg every 6 hours  -ID consult appreciated     #COVID 12/10/23 with persistent cough/ bronchitis  -Possibly CT changes could be related to COVID pneumonitis  -Continue breo     #Metastatic esophageal adenocarcinoma s/p gastroesophagostomy 9/2022 c/b post op anastomotic leak   #Pancreatic mass 4/23- s/p stent insertion that subsequently migrated and stent was removed 12/12/23  -GI consulted from ER, Dr. Arslan Myles had discussed with Dr. Wing who had advised to hold consult and re-eval tomorrow if patient still having symptoms. Patient typically sees Dr. Ritchie and he is not in town  -PPI and pancrealipase   -Intractable pain due to malignancy, added oral pain medication OxyContin along with IV pain medication regimen     #H/o CAD s/p CABG  #Hypertension  #HLD  -Continue home meds, PRN hydralazine      #Recent alpha hemolytic strep bacteremia 12/2023  -Completed PO augmentin 12/27/23  -Repeat blood culture done on 12/29/2023 with no growth so far.     #Depression  -Sertraline      Discussed with patient, discussed with RN.  Video swallow  with No penetration or aspiration was seen with thin liquids, honey, nectar, purees, or solids .     repeat chest x-ray today showed  Bilateral interstitial infiltrates with interval worsening infiltrates in the left lung compared to the previous exam from 12/29/2023.  Differential considerations include atypical pneumonia and other inflammatory interstitial lung disease.  Cardiomegaly without edema .  IV antibiotics changed to IV ancef by ID today     Dr Arshad will follow from tomorrow

## 2024-01-03 NOTE — PROGRESS NOTES
NURSING DISCHARGE NOTE    Discharged Home via Wheelchair.  Accompanied by RN and family member  Belongings Taken by patient/family.    Pt discharged home via car ride by family member at approx. 1725. AVS discussed w/ pt. All belongings sent w/ pt.

## 2024-01-03 NOTE — PROGRESS NOTES
INFECTIOUS DISEASE  PROGRESS NOTE            [facility]    Dontae Ortegacristina Champion Patient Status:  Inpatient    10/15/1970 MRN [id number]   [facility] 4NW-A Attending Sakina Hernandez MD   Hosp Day # 5 PCP Adrian Horowitz MD     Antibiotics:#5  Cefazolin   PO vanc#4        Subjective:  comfortable    Objective:  Temp:  [97.5 °F (36.4 °C)-98 °F (36.7 °C)] 97.8 °F (36.6 °C)  Pulse:  [58-61] 60  Resp:  [20] 20  BP: (116-152)/(54-74) 150/74  SpO2:  [91 %-93 %] 91 %    General: awake alert  Vital signs:Temp:  [97.5 °F (36.4 °C)-98 °F (36.7 °C)] 97.8 °F (36.6 °C)  Pulse:  [58-61] 60  Resp:  [20] 20  BP: (116-152)/(54-74) 150/74  SpO2:  [91 %-93 %] 91 %  HEENT: Moist mucous membranes. Extraocular muscles are intact.  Neck: supple no masses  Respiratory: Non labored, symmetric excursion, normal respirations  Cardiovascular: no irregularities in rhythm  Abdomen: Soft, nontender, nondistended.   Musculoskeletal: joints: no swelling   Integument: No lesions. No erythema. No open wounds.  Labs:     COVID-19 Lab Results    COVID-19  Lab Results   Component Value Date    COVID19 Not Detected 2023    COVID19 Detected (A) 2023    COVID19 Detected (A) 12/10/2023       Pro-Calcitonin  Recent Labs   Lab 23  0531 23  0510 24  0507   PCT 0.20* 0.23* 0.34*       Cardiac  No results for input(s): \"TROP\", \"PBNP\" in the last 168 hours.    Creatinine Kinase  No results for input(s): \"CK\" in the last 168 hours.    Inflammatory Markers  No results for input(s): \"CRP\", \"MYAH\", \"LDH\", \"DDIMER\" in the last 168 hours.    Recent Labs   Lab 24  0607   RBC 2.92*   HGB 8.8*   HCT 28.2*   MCV 96.6   MCH 30.1   MCHC 31.2   RDW 13.2   NEPRELIM 6.36   WBC 8.1   .0         Recent Labs   Lab 23  1556 23  0531 23  0510 24  0507 24  0607   * 111*  --  90 71   BUN 13 16  --  9 6*   CREATSERUM 0.59* 0.53*  --  0.45* 0.35*   CA 8.7 8.9  --  8.7 8.2*   ALB 2.8*  2.7*  --  2.4*  --     139  --  139 140   K 3.6 3.4* 3.4* 3.5 3.4*    108  --  106 107   CO2 28.0 27.0  --  28.0 28.0   ALKPHO 105 106  --  110  --    AST 19 19  --  14*  --    ALT 26 22  --  19  --    BILT 0.3 0.3  --  0.4  --    TP 7.4 7.2  --  6.7  --        No results found for: \"VANCT\"  Microbiology    Hospital Encounter on 12/29/23   1. Blood Culture     Status: None (Preliminary result)    Collection Time: 12/29/23  3:56 PM    Specimen: Blood,peripheral   Result Value Ref Range    Blood Culture Result No Growth 4 Days N/A         Radiology  CT reviewed      Problem list reviewed:  Patient Active Problem List   Diagnosis    Primary hypertension    Hyperlipidemia with target low density lipoprotein (LDL) cholesterol less than 70 mg/dL    S/P CABG x 4    Nontoxic multinodular goiter    Pulmonary nodules    Thrombophlebitis leg    BRANDAN (generalized anxiety disorder)    Right shoulder Arthroscopy acromioplasty ,distal  clavicle resection, rotator cuff/labral debridment  Global 05/13/2021    Right bicipital tenosynovitis    Malignant neoplasm of esophagus (HCC)    Esophageal anastomotic leak    Esophageal obstruction    On total parenteral nutrition (TPN)    Mechanical complication of esophagostomy (HCC)    Abdominal pain of unknown etiology    Migration of esophageal stent    Gastroparesis    Aspiration pneumonia of both lungs due to gastric secretions (HCC)    Esophageal carcinoma (HCC)    Normocytic anemia    Esophageal fistula    Postnasal drip    Acute cough    Acquired bronchoesophageal fistula (HCC)    Pneumonia of both lower lobes due to infectious organism    Malignant neoplasm of pancreas (HCC)    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 difficile colitis             ASSESSMENT/PLAN:  1.  Staph aureus isolated from sputum culture  -recent COVID infection  -aspiration risk gastric outlet obstruction  IV cefazolin  Po when dc    2. CDIFF, diarrhea improved  Po vancomycin beyond other abx    3. Gastric adenocarcinoma sp resection      Conner Bell MD, MD  [facility]  [phone]

## 2024-01-03 NOTE — PROGRESS NOTES
[facility]  Pulmonary/Critical Care/Sleep Medicine Progress note    Dontae Cortez . Patient Status:  Inpatient    10/15/1970 MRN [id number]   Location [facility] 4NW-A Attending Sakina Hernandez MD   Hosp Day # 5 PCP Adrian Horowitz MD     Chief Complaint   Patient presents with    Nausea/Vomiting/Diarrhea    Difficulty Breathing    Abdomen/Flank Pain        History of Present Illness:    Feels okay.  Right lateral sternal area pain tolerable at the location of sternal wires.  Denies abdominal pain or diarrhea or constipation.  Denies significant shortness of breath or cough.  .  History:  Past Medical History:   Diagnosis Date    Back problem     Belching 2022    Black stools 2022    Borderline diabetes     Dx in 2013 - HgA1C 6.2%    C. difficile diarrhea 10/23/2022    pt treated and without symptoms    Chest pain 2022    Coronary artery disease     On 13: CABG x 4 with LIMA to LAD and SVG to diagonal, OM and PDA    Decorative tattoo 2000    Depression     Esophageal cancer (HCC)     completed chemo    Esophageal reflux     Essential hypertension     Exposure to medical diagnostic radiation     last tx 2022    Frequent urination 2013    Gastroparesis     Heartburn 2022    High blood pressure     High cholesterol 2013    Found when I had quadruple bypass    History of COVID-19 10/16/2022    asymptomatic - pt was dx during a hospitalization for another diagnosis. No continued symptoms    History of stomach ulcers     Hyperlipidemia     Hyperlipidemia LDL goal < 70     Indigestion 2022    Morbid obesity with BMI of 40.0-44.9, adult (HCC)     Muscle weakness     Nontoxic multinodular goiter     Dx in 2013: pt was told that imaging showed thyroid cysts per PCP    Pancreatic cancer (HCC)     last dose 2023 is scheduled for another round 23    Peripheral vascular disease (HCC)     pt denies    Personal history of antineoplastic  chemotherapy     for esophageal cancer/completed    Personal history of antineoplastic chemotherapy     pancreatic cancer    Problems with swallowing 02/01/2022    Pulmonary nodules     Dx in 8/2013: CT chest showed small bilateral fissural-based lung nodules less than 1 cm    S/P CABG x 4     On 8/16/13: CABG x 4 with LIMA to LAD and SVG to diagonal, OM and PDA    Shortness of breath     Vomiting 02/01/2022     Past Surgical History:   Procedure Laterality Date    APPENDECTOMY      APPENDECTOMY      ARTHROSCOPY OF JOINT UNLISTED      right shoulder    CABG      On 8/16/13: CABG x 4 with LIMA to LAD and SVG to diagonal, OM and PDA    CARDIAC CATH LAB      On 8/14/2013: cardiac cath showed 3-vessel disease    OTHER SURGICAL HISTORY      1.       Laparoscopic robotic-assisted esophagogastrectomy.     Family History   Problem Relation Age of Onset    Cancer Mother         breast and colon     Diabetes Neg       reports that he quit smoking about 10 years ago. His smoking use included cigarettes. He has never used smokeless tobacco. He reports that he does not currently use alcohol. He reports that he does not use drugs.    Allergies:  No Known Allergies    Medications:    Current Facility-Administered Medications:     ceFAZolin (Ancef) 2 g in 20mL IV syringe premix, 2 g, Intravenous, Q8H    calcium carbonate (Tums) chewable tab 500 mg, 500 mg, Oral, TID PRN    ipratropium-albuterol (Duoneb) 0.5-2.5 (3) MG/3ML inhalation solution 3 mL, 3 mL, Nebulization, Q6H PRN    oxyCODONE ER (OxyCONTIN ER) 12 hr tab 10 mg, 10 mg, Oral, Q8H    ipratropium-albuterol (Duoneb) 0.5-2.5 (3) MG/3ML inhalation solution 3 mL, 3 mL, Nebulization, Q4H PRN    HYDROmorphone (Dilaudid) 1 MG/ML injection 0.4 mg, 0.4 mg, Intravenous, Q2H PRN **OR** HYDROmorphone (Dilaudid) 1 MG/ML injection 0.8 mg, 0.8 mg, Intravenous, Q2H PRN **OR** HYDROmorphone (Dilaudid) 1 MG/ML injection 1.2 mg, 1.2 mg, Intravenous, Q2H PRN    vancomycin (Vancocin) cap 125 mg,  125 mg, Oral, Q6H    pancrelipase (Lip-Prot-Amyl) (Zenpep) DR particles cap 25,000 Units, 25,000 Units, Oral, TID CC    sodium chloride 0.9% infusion, , Intravenous, Continuous    enoxaparin (Lovenox) 40 MG/0.4ML SUBQ injection 40 mg, 40 mg, Subcutaneous, Daily    acetaminophen (Tylenol Extra Strength) tab 500 mg, 500 mg, Oral, Q4H PRN    melatonin tab 3 mg, 3 mg, Oral, Nightly PRN    polyethylene glycol (PEG 3350) (Miralax) 17 g oral packet 17 g, 17 g, Oral, Daily PRN    sennosides (Senokot) tab 17.2 mg, 17.2 mg, Oral, Nightly PRN    bisacodyl (Dulcolax) 10 MG rectal suppository 10 mg, 10 mg, Rectal, Daily PRN    fleet enema (Fleet) 7-19 GM/118ML rectal enema 133 mL, 1 enema, Rectal, Once PRN    ondansetron (Zofran) 4 MG/2ML injection 4 mg, 4 mg, Intravenous, Q6H PRN    prochlorperazine (Compazine) 10 MG/2ML injection 5 mg, 5 mg, Intravenous, Q8H PRN    benzonatate (Tessalon) cap 200 mg, 200 mg, Oral, TID PRN    glycerin-hypromellose- (Artifical Tears) 0.2-0.2-1 % ophthalmic solution 1 drop, 1 drop, Both Eyes, QID PRN    sodium chloride (Saline Mist) 0.65 % nasal solution 1 spray, 1 spray, Each Nare, Q3H PRN    pantoprazole (Protonix) 40 mg in sodium chloride 0.9% PF 10 mL IV push, 40 mg, Intravenous, Q12H    fluticasone furoate-vilanterol (Breo Ellipta) 100-25 MCG/ACT inhaler 1 puff, 1 puff, Inhalation, Daily    guaiFENesin-codeine (Robitussin AC) 100-10 MG/5ML oral solution 5 mL, 5 mL, Oral, Q4H PRN    heparin (Porcine) 100 Units/mL lock flush 500 Units, 5 mL, Intravenous, PRN    fluticasone propionate (Flonase) 50 MCG/ACT nasal suspension 1 spray, 1 spray, Each Nare, Daily    baclofen (Lioresal) tab 10 mg, 10 mg, Oral, TID PRN    cetirizine (ZyrTEC) tab 5 mg, 5 mg, Oral, Daily    losartan (Cozaar) tab 50 mg, 50 mg, Oral, Daily    metoprolol succinate ER (Toprol XL) 24 hr tab 50 mg, 50 mg, Oral, Daily Beta Blocker    sertraline (Zoloft) tab 100 mg, 100 mg, Oral, Daily    sucralfate (Carafate) tab 1 g, 1 g,  Oral, TID PRN    diphenhydrAMINE (Benadryl) 50 mg/mL  injection 25 mg, 25 mg, Intravenous, Q8H PRN    hydrALAzine (Apresoline) 20 mg/mL injection 5 mg, 5 mg, Intravenous, Q6H PRN    Intake/Output:    Intake/Output Summary (Last 24 hours) at 1/3/2024 0933  Last data filed at 1/3/2024 0600  Gross per 24 hour   Intake 999 ml   Output --   Net 999 ml          Review of Systems    Review of Systems: A comprehensive 10 point review of systems was completed.  Pertinent positives and negatives noted in the the HPI.         Patient Vitals for the past 24 hrs:   BP Temp Temp src Pulse Resp SpO2   01/01/24 0651 133/59 98.6 °F (37 °C) Oral 67 14 91 %   01/01/24 0149 -- 98.1 °F (36.7 °C) Oral -- -- --   12/31/23 2005 120/61 98.9 °F (37.2 °C) Oral 73 16 92 %   12/31/23 1100 130/63 98.7 °F (37.1 °C) Oral 67 18 96 %     Vitals:    01/02/24 1131 01/02/24 2155 01/02/24 2316 01/03/24 0351   BP: 116/54 140/58 152/69 150/74   BP Location: Right arm Right arm Right arm Right arm   Pulse: 61 58  60   Resp: 20  20 20   Temp: 97.7 °F (36.5 °C) 98 °F (36.7 °C) 97.5 °F (36.4 °C) 97.8 °F (36.6 °C)   TempSrc: Oral Oral Oral Oral   SpO2: 93% 93%  91%   Weight:       Height:          Body mass index is 23.11 kg/m².     Physical Exam  Constitutional:       General: He is not in acute distress.     Appearance: Normal appearance. He is not ill-appearing or diaphoretic.   HENT:      Head: Normocephalic and atraumatic.      Nose: Nose normal. No congestion or rhinorrhea.      Mouth/Throat:      Mouth: Mucous membranes are moist.      Pharynx: Oropharynx is clear. No oropharyngeal exudate or posterior oropharyngeal erythema.   Eyes:      Extraocular Movements: Extraocular movements intact.      Pupils: Pupils are equal, round, and reactive to light.   Cardiovascular:      Rate and Rhythm: Normal rate.      Pulses: Normal pulses.      Heart sounds: Normal heart sounds. No murmur heard.  Pulmonary:      Effort: Pulmonary effort is normal. No respiratory  distress.      Breath sounds: Normal breath sounds. No wheezing or rhonchi.      Comments: Diminished breath sounds bilateral lower lobes otherwise clear to auscultation  Right sternal area with bulging wire from previous sternotomy with area of tenderness though no erythema or warmth noted.  Chest:      Chest wall: No tenderness.   Abdominal:      General: Abdomen is flat. Bowel sounds are normal.      Palpations: Abdomen is soft.   Musculoskeletal:         General: Normal range of motion.   Skin:     General: Skin is warm.   Neurological:      General: No focal deficit present.      Mental Status: He is alert and oriented to person, place, and time.   Psychiatric:         Mood and Affect: Mood normal.         Behavior: Behavior normal.         Thought Content: Thought content normal.         Judgment: Judgment normal.         Recent Labs   Lab 12/30/23  0531 01/02/24  0507 01/03/24  0607   WBC 8.7 10.3 8.1   HGB 10.4* 9.6* 8.8*   HCT 33.5* 30.8* 28.2*   .0 246.0 209.0       Recent Labs   Lab 12/29/23  1556 12/30/23  0531 12/31/23  0510 01/02/24  0507 01/03/24  0607    139  --  139 140   K 3.6 3.4* 3.4* 3.5 3.4*    108  --  106 107   CO2 28.0 27.0  --  28.0 28.0   BUN 13 16  --  9 6*   CREATSERUM 0.59* 0.53*  --  0.45* 0.35*   CA 8.7 8.9  --  8.7 8.2*   ALB 2.8* 2.7*  --  2.4*  --    ALKPHO 105 106  --  110  --    ALT 26 22  --  19  --    AST 19 19  --  14*  --    * 111*  --  90 71       No results for input(s): \"MG\" in the last 168 hours.    Lab Results   Component Value Date    PHOS 3.5 11/27/2023        No results for input(s): \"PT\", \"INR\", \"PTT\" in the last 168 hours.    No results for input(s): \"ABGPHT\", \"RVJFFN1A\", \"RBZJX5M\", \"ABGHCO3\", \"ABGBE\", \"TEMP\", \"TACOS\", \"SITE\", \"DEV\", \"THGB\" in the last 168 hours.    No results for input(s): \"TROP\", \"CKMB\" in the last 168 hours.      Cultures:   Hospital Encounter on 12/29/23   1. Blood Culture     Status: None (Preliminary result)     Collection Time: 12/29/23  3:56 PM    Specimen: Blood,peripheral   Result Value Ref Range    Blood Culture Result No Growth 4 Days N/A              Component  Ref Range & Units 12/31/23 0510   Procalcitonin  <0.16 ng/mL 0.23 High             Component  Ref Range & Units 1/2/24 0507   Procalcitonin  <0.16 ng/mL 0.34 High         Radiology personally reviewed:  XR VIDEO SWALLOW (CPT=74230)    Result Date: 1/2/2024  PROCEDURE:  XR VIDEO SWALLOW (CPT=74230)  TECHNIQUE:  Video fluoroscopic swallowing study was performed in cooperation with the speech pathologist.  Barium of varying consistencies was administered orally with patient in lateral projection.  COMPARISON:  [facility] , XR, XR VIDEO SWALLOW (CPT=74230), 11/18/2022, 2:09 PM.  INDICATIONS:  Dysphagia  PATIENT STATED HISTORY: (As transcribed by Technologist)  patient states history of trouble swallowing and coughing while eating and drinking. EGD, esophageal stent removed, and UGI test done within the last few months without improvement to symptoms.   CINE CAPTURES:  7 FLUORSCOPY TIME:  1 minute 45 seconds RADIATION DOSE (AIR KERMA PRODUCT):  65.1 uGy/m2   FINDINGS:  No penetration or aspiration was seen with thin liquids, honey, nectar, purees, or solids.  PLEASE SEE SPEECH PATHOLOGIST REPORT FOR FULL ASSESSMENT OF SWALLOWING MECHANISM.   LOCATION:  [facility]    Dictated by (CST): Yahir To MD on 1/02/2024 at 9:18 AM     Finalized by (CST): Yahir To MD on 1/02/2024 at 9:19 AM       XR CHEST AP PORTABLE  (CPT=71045)    Result Date: 1/2/2024  CONCLUSION:  Bilateral interstitial infiltrates with interval worsening infiltrates in the left lung compared to the previous exam from 12/29/2023.  Differential considerations include atypical pneumonia and other inflammatory interstitial lung disease. 2. Cardiomegaly without edema.   LOCATION:  [facility]      Dictated by (CST): Cuong Wan MD on 1/02/2024 at 7:57 AM     Finalized by (CST): Cuong Wan MD on 1/02/2024  at 8:01 AM         Patient Active Problem List   Diagnosis    Primary hypertension    Hyperlipidemia with target low density lipoprotein (LDL) cholesterol less than 70 mg/dL    S/P CABG x 4    Nontoxic multinodular goiter    Pulmonary nodules    Thrombophlebitis leg    BRANDAN (generalized anxiety disorder)    Right shoulder Arthroscopy acromioplasty ,distal  clavicle resection, rotator cuff/labral debridment  Global 05/13/2021    Right bicipital tenosynovitis    Malignant neoplasm of esophagus (HCC)    Esophageal anastomotic leak    Esophageal obstruction    On total parenteral nutrition (TPN)    Mechanical complication of esophagostomy (HCC)    Abdominal pain of unknown etiology    Migration of esophageal stent    Gastroparesis    Aspiration pneumonia of both lungs due to gastric secretions (HCC)    Esophageal carcinoma (HCC)    Normocytic anemia    Esophageal fistula    Postnasal drip    Acute cough    Acquired bronchoesophageal fistula (HCC)    Pneumonia of both lower lobes due to infectious organism    Malignant neoplasm of pancreas (HCC)    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 difficile colitis     Assessment:  Sepsis secondary to C diff positive and COVID positive.  Left lung infiltrate noted, which is new compared to CT from 12/16, aspiration versus HCAP; CT Chest 12/30/2023 : increasing ground-glass opacities throughout the left upper lobe as well as to a lesser degree within the left lower lobe.  This is seen over a background of reticular nodular opacities that are diffusely seen throughout the lower lobes and right middle lobe.  Procalcitonin  minimally elevated, strep pneumonia and Legionella urinary antigen negative.  Histoplasma galactomannan and Blastomyces dermatitidis antigen pending  C Diff positive diarrhea: Resolved  COVID +   Possible HCAP  Right upper chest next to sternum tender area consistent with musculoskeletal pain no erythema warmth noted in the area.  Recurrent bronchitis: Suspect due to aspiration: Clinically improving  Esophageal cancer c/b post op anasthomatoc leak  Dysphagia: Video swallow study by speech pathology did not show dysphagia  Pancreatic mass s/p stent insertion that was removed on 12/12/23      Plan:    Antibiotics per infectious disease  ID follow-up  Continue Breo Ellipta 100-25 mcg 1 puff daily  Change DuoNebs 4 times daily as needed  If cough remains persistent, may consider bronchoscopy for endobronchial survey  Discontinue Protonix considering C. Difficile  May discharge home and follow-up in pulmonary office in about 6 weeks Dr. Brooks         Thank You for allowing me to participate in this patient's care     Dusty Brooks MD

## 2024-01-04 ENCOUNTER — HOSPITAL ENCOUNTER (OUTPATIENT)
Dept: NUCLEAR MEDICINE | Facility: HOSPITAL | Age: 54
Discharge: HOME | End: 2024-01-04
Attending: INTERNAL MEDICINE
Payer: COMMERCIAL

## 2024-01-04 ENCOUNTER — PATIENT OUTREACH (OUTPATIENT)
Dept: CASE MANAGEMENT | Age: 54
End: 2024-01-04

## 2024-01-04 DIAGNOSIS — C15.5 MALIGNANT NEOPLASM OF LOWER THIRD OF ESOPHAGUS (HCC): ICD-10-CM

## 2024-01-04 LAB — GLUCOSE BLD-MCNC: 76 MG/DL (ref 70–99)

## 2024-01-04 PROCEDURE — 78815 PET IMAGE W/CT SKULL-THIGH: CPT | Performed by: INTERNAL MEDICINE

## 2024-01-04 PROCEDURE — 82962 GLUCOSE BLOOD TEST: CPT

## 2024-01-05 ENCOUNTER — TELEPHONE (OUTPATIENT)
Dept: HEMATOLOGY/ONCOLOGY | Facility: HOSPITAL | Age: 54
End: 2024-01-05

## 2024-01-05 ENCOUNTER — SOCIAL WORK SERVICES (OUTPATIENT)
Dept: HEMATOLOGY/ONCOLOGY | Facility: HOSPITAL | Age: 54
End: 2024-01-05

## 2024-01-05 NOTE — TELEPHONE ENCOUNTER
Called to discuss PET/CT results. PET shows progression consistent with tumor markers. We will need to add back FOLFOX.    PET/CT Results: 1. Diffuse ground-glass opacity in the left lung, which has intervally increased in comparison to 12/30/2023.  There is new FDG avid mediastinal lymphadenopathy, which may be reactive versus malignant.  Continued attention on follow-up is recommended.  Continued follow-up to resolution of the ground-glass opacities recommended. 2. New FDG avid hepatic and pancreatic lesions.  The uptake near the pancreatic uncinate process is similar to 4/10/2023, which had resolved on 7/27/2023 exam.  This is suspicious for recurrence of disease

## 2024-01-05 NOTE — PROGRESS NOTES
SW received a request for update from [employer] (Long term disability provider). SW assisted with forms and sent medical records from 12/1/23 to present.  SW a copy of the confirmation and completed forms to pt via [employer] message.    [place] [address]  Ph: [phone] [email or web address]  Fax: [phone]

## 2024-01-08 ENCOUNTER — PATIENT MESSAGE (OUTPATIENT)
Facility: CLINIC | Age: 54
End: 2024-01-08

## 2024-01-08 ENCOUNTER — APPOINTMENT (OUTPATIENT)
Dept: GENERAL RADIOLOGY | Facility: HOSPITAL | Age: 54
End: 2024-01-08
Attending: EMERGENCY MEDICINE
Payer: COMMERCIAL

## 2024-01-08 ENCOUNTER — HOSPITAL ENCOUNTER (INPATIENT)
Facility: HOSPITAL | Age: 54
LOS: 5 days | Discharge: HOME | End: 2024-01-13
Attending: EMERGENCY MEDICINE | Admitting: HOSPITALIST
Payer: COMMERCIAL

## 2024-01-08 DIAGNOSIS — R06.02 SHORTNESS OF BREATH: ICD-10-CM

## 2024-01-08 DIAGNOSIS — E87.6 HYPOKALEMIA: ICD-10-CM

## 2024-01-08 DIAGNOSIS — R13.10 DYSPHAGIA, UNSPECIFIED TYPE: Primary | ICD-10-CM

## 2024-01-08 PROBLEM — R07.9 CHEST PAIN: Status: ACTIVE | Noted: 2023-01-01

## 2024-01-08 PROBLEM — R07.9 CHEST PAIN: Status: ACTIVE | Noted: 2023-12-10

## 2024-01-08 LAB
ALBUMIN SERPL-MCNC: 2.8 G/DL (ref 3.4–5)
ALBUMIN/GLOB SERPL: 0.7 {RATIO} (ref 1–2)
ALP LIVER SERPL-CCNC: 108 U/L
ALT SERPL-CCNC: 23 U/L
ANION GAP SERPL CALC-SCNC: 2 MMOL/L (ref 0–18)
AST SERPL-CCNC: 21 U/L (ref 15–37)
BASOPHILS # BLD AUTO: 0.07 X10(3) UL (ref 0–0.2)
BASOPHILS NFR BLD AUTO: 0.5 %
BILIRUB SERPL-MCNC: 0.3 MG/DL (ref 0.1–2)
BUN BLD-MCNC: 12 MG/DL (ref 9–23)
CALCIUM BLD-MCNC: 8.4 MG/DL (ref 8.5–10.1)
CHLORIDE SERPL-SCNC: 105 MMOL/L (ref 98–112)
CO2 SERPL-SCNC: 32 MMOL/L (ref 21–32)
CREAT BLD-MCNC: 0.31 MG/DL
EGFRCR SERPLBLD CKD-EPI 2021: 140 ML/MIN/1.73M2 (ref 60–?)
EOSINOPHIL # BLD AUTO: 0.34 X10(3) UL (ref 0–0.7)
EOSINOPHIL NFR BLD AUTO: 2.6 %
ERYTHROCYTE [DISTWIDTH] IN BLOOD BY AUTOMATED COUNT: 14 %
GLOBULIN PLAS-MCNC: 4.3 G/DL (ref 2.8–4.4)
GLUCOSE BLD-MCNC: 116 MG/DL (ref 70–99)
HCT VFR BLD AUTO: 33.1 %
HGB BLD-MCNC: 10.5 G/DL
IMM GRANULOCYTES # BLD AUTO: 0.5 X10(3) UL (ref 0–1)
IMM GRANULOCYTES NFR BLD: 3.8 %
LACTATE SERPL-SCNC: 0.5 MMOL/L (ref 0.4–2)
LIPASE SERPL-CCNC: 10 U/L (ref 13–75)
LYMPHOCYTES # BLD AUTO: 1.02 X10(3) UL (ref 1–4)
LYMPHOCYTES NFR BLD AUTO: 7.8 %
MCH RBC QN AUTO: 29.9 PG (ref 26–34)
MCHC RBC AUTO-ENTMCNC: 31.7 G/DL (ref 31–37)
MCV RBC AUTO: 94.3 FL
MONOCYTES # BLD AUTO: 0.88 X10(3) UL (ref 0.1–1)
MONOCYTES NFR BLD AUTO: 6.7 %
NEUTROPHILS # BLD AUTO: 10.3 X10 (3) UL (ref 1.5–7.7)
NEUTROPHILS # BLD AUTO: 10.3 X10(3) UL (ref 1.5–7.7)
NEUTROPHILS NFR BLD AUTO: 78.6 %
OSMOLALITY SERPL CALC.SUM OF ELEC: 289 MOSM/KG (ref 275–295)
PLATELET # BLD AUTO: 283 10(3)UL (ref 150–450)
POTASSIUM SERPL-SCNC: 3.1 MMOL/L (ref 3.5–5.1)
PROCALCITONIN SERPL-MCNC: 0.14 NG/ML (ref ?–0.16)
PROT SERPL-MCNC: 7.1 G/DL (ref 6.4–8.2)
RBC # BLD AUTO: 3.51 X10(6)UL
SODIUM SERPL-SCNC: 139 MMOL/L (ref 136–145)
WBC # BLD AUTO: 13.1 X10(3) UL (ref 4–11)

## 2024-01-08 PROCEDURE — 99223 1ST HOSP IP/OBS HIGH 75: CPT | Performed by: HOSPITALIST

## 2024-01-08 PROCEDURE — 71045 X-RAY EXAM CHEST 1 VIEW: CPT | Performed by: EMERGENCY MEDICINE

## 2024-01-08 RX ORDER — SODIUM CHLORIDE 9 MG/ML
INJECTION, SOLUTION INTRAVENOUS CONTINUOUS
Status: ACTIVE | OUTPATIENT
Start: 2024-01-08 | End: 2024-01-11

## 2024-01-08 RX ORDER — ENOXAPARIN SODIUM 100 MG/ML
40 INJECTION SUBCUTANEOUS DAILY
Status: DISCONTINUED | OUTPATIENT
Start: 2024-01-09 | End: 2024-01-10

## 2024-01-08 RX ORDER — BENZONATATE 100 MG/1
100 CAPSULE ORAL 3 TIMES DAILY PRN
Status: DISCONTINUED | OUTPATIENT
Start: 2024-01-08 | End: 2024-01-13

## 2024-01-08 RX ORDER — SENNOSIDES 8.6 MG
17.2 TABLET ORAL NIGHTLY PRN
Status: DISCONTINUED | OUTPATIENT
Start: 2024-01-08 | End: 2024-01-13

## 2024-01-08 RX ORDER — ONDANSETRON 2 MG/ML
4 INJECTION INTRAMUSCULAR; INTRAVENOUS EVERY 4 HOURS PRN
Status: ACTIVE | OUTPATIENT
Start: 2024-01-08 | End: 2024-01-09

## 2024-01-08 RX ORDER — MORPHINE SULFATE 4 MG/ML
4 INJECTION, SOLUTION INTRAMUSCULAR; INTRAVENOUS EVERY 30 MIN PRN
Status: ACTIVE | OUTPATIENT
Start: 2024-01-08 | End: 2024-01-09

## 2024-01-08 RX ORDER — MORPHINE SULFATE 4 MG/ML
4 INJECTION, SOLUTION INTRAMUSCULAR; INTRAVENOUS ONCE
Status: COMPLETED | OUTPATIENT
Start: 2024-01-08 | End: 2024-01-08

## 2024-01-08 RX ORDER — BISACODYL 10 MG
10 SUPPOSITORY, RECTAL RECTAL
Status: DISCONTINUED | OUTPATIENT
Start: 2024-01-08 | End: 2024-01-13

## 2024-01-08 RX ORDER — MORPHINE SULFATE 2 MG/ML
2 INJECTION, SOLUTION INTRAMUSCULAR; INTRAVENOUS EVERY 2 HOUR PRN
Status: DISCONTINUED | OUTPATIENT
Start: 2024-01-08 | End: 2024-01-13

## 2024-01-08 RX ORDER — PANTOPRAZOLE SODIUM 40 MG/1
40 TABLET, DELAYED RELEASE ORAL
Status: DISCONTINUED | OUTPATIENT
Start: 2024-01-09 | End: 2024-01-10

## 2024-01-08 RX ORDER — CETIRIZINE HYDROCHLORIDE 5 MG/1
5 TABLET ORAL DAILY
Status: DISCONTINUED | OUTPATIENT
Start: 2024-01-08 | End: 2024-01-13

## 2024-01-08 RX ORDER — FLUTICASONE FUROATE AND VILANTEROL 100; 25 UG/1; UG/1
1 POWDER RESPIRATORY (INHALATION) DAILY
Status: DISCONTINUED | OUTPATIENT
Start: 2024-01-08 | End: 2024-01-13

## 2024-01-08 RX ORDER — VANCOMYCIN HYDROCHLORIDE 125 MG/1
125 CAPSULE ORAL EVERY 6 HOURS
Qty: 20 CAPSULE | Refills: 0 | Status: DISCONTINUED | OUTPATIENT
Start: 2024-01-09 | End: 2024-01-13

## 2024-01-08 RX ORDER — METOPROLOL SUCCINATE 50 MG/1
50 TABLET, EXTENDED RELEASE ORAL
Status: DISCONTINUED | OUTPATIENT
Start: 2024-01-09 | End: 2024-01-13

## 2024-01-08 RX ORDER — SERTRALINE HYDROCHLORIDE 100 MG/1
100 TABLET, FILM COATED ORAL DAILY
Status: DISCONTINUED | OUTPATIENT
Start: 2024-01-08 | End: 2024-01-13

## 2024-01-08 RX ORDER — ENEMA 19; 7 G/133ML; G/133ML
1 ENEMA RECTAL ONCE AS NEEDED
Status: DISCONTINUED | OUTPATIENT
Start: 2024-01-08 | End: 2024-01-13

## 2024-01-08 RX ORDER — PROCHLORPERAZINE EDISYLATE 5 MG/ML
5 INJECTION INTRAMUSCULAR; INTRAVENOUS EVERY 8 HOURS PRN
Status: DISCONTINUED | OUTPATIENT
Start: 2024-01-08 | End: 2024-01-09

## 2024-01-08 RX ORDER — BACLOFEN 10 MG/1
10 TABLET ORAL 3 TIMES DAILY PRN
Status: DISCONTINUED | OUTPATIENT
Start: 2024-01-08 | End: 2024-01-13

## 2024-01-08 RX ORDER — SODIUM CHLORIDE 9 MG/ML
INJECTION, SOLUTION INTRAVENOUS CONTINUOUS
Status: DISCONTINUED | OUTPATIENT
Start: 2024-01-08 | End: 2024-01-13

## 2024-01-08 RX ORDER — HYDROCODONE BITARTRATE AND ACETAMINOPHEN 5; 325 MG/1; MG/1
1 TABLET ORAL ONCE
Status: COMPLETED | OUTPATIENT
Start: 2024-01-08 | End: 2024-01-08

## 2024-01-08 RX ORDER — MORPHINE SULFATE 4 MG/ML
4 INJECTION, SOLUTION INTRAMUSCULAR; INTRAVENOUS EVERY 2 HOUR PRN
Status: DISCONTINUED | OUTPATIENT
Start: 2024-01-08 | End: 2024-01-13

## 2024-01-08 RX ORDER — ACETAMINOPHEN 500 MG
500 TABLET ORAL EVERY 4 HOURS PRN
Status: DISCONTINUED | OUTPATIENT
Start: 2024-01-08 | End: 2024-01-13

## 2024-01-08 RX ORDER — SODIUM CHLORIDE 9 MG/ML
INJECTION, SOLUTION INTRAVENOUS CONTINUOUS
Status: DISCONTINUED | OUTPATIENT
Start: 2024-01-08 | End: 2024-01-09

## 2024-01-08 RX ORDER — MORPHINE SULFATE 2 MG/ML
1 INJECTION, SOLUTION INTRAMUSCULAR; INTRAVENOUS EVERY 2 HOUR PRN
Status: DISCONTINUED | OUTPATIENT
Start: 2024-01-08 | End: 2024-01-13

## 2024-01-08 RX ORDER — ONDANSETRON 2 MG/ML
4 INJECTION INTRAMUSCULAR; INTRAVENOUS EVERY 6 HOURS PRN
Status: DISCONTINUED | OUTPATIENT
Start: 2024-01-08 | End: 2024-01-13

## 2024-01-08 RX ORDER — LOSARTAN POTASSIUM 50 MG/1
50 TABLET ORAL DAILY
Status: DISCONTINUED | OUTPATIENT
Start: 2024-01-08 | End: 2024-01-13

## 2024-01-08 RX ORDER — CEPHALEXIN 500 MG/1
500 CAPSULE ORAL 3 TIMES DAILY
Status: DISCONTINUED | OUTPATIENT
Start: 2024-01-08 | End: 2024-01-13

## 2024-01-08 RX ORDER — MELATONIN
3 NIGHTLY PRN
Status: DISCONTINUED | OUTPATIENT
Start: 2024-01-08 | End: 2024-01-13

## 2024-01-08 RX ORDER — POLYETHYLENE GLYCOL 3350 17 G/17G
17 POWDER, FOR SOLUTION ORAL DAILY PRN
Status: DISCONTINUED | OUTPATIENT
Start: 2024-01-08 | End: 2024-01-13

## 2024-01-08 NOTE — TELEPHONE ENCOUNTER
From: Dontae Cortez Jr.  To: Rey Licea  Sent: 1/8/2024 7:22 AM CST  Subject: Dontae Cortez     coughing can't drink anything with out me coughing it back up and choking up my own mucus and my top of the rib cage kills me and a sharp pain in chest. No fever, but I have chills. Taking my antibiotics as prescribed.   Showing 1 of 1 I know you are probably not going to help at this is getting too be a joke I can't keep doing this is not normal at all

## 2024-01-08 NOTE — PAYOR COMM NOTE
--------------  DISCHARGE REVIEW    Payor: [employer]  Subscriber #:  [id number]  Authorization Number: [id number]    Admit date: 12/29/23  Admit time:  10:40 PM  Discharge Date: 1/3/2024  5:25 PM     Admitting Physician: Sophia Gibson MD  Attending Physician:  No att. providers found  Primary Care Physician: Adrian Horowitz MD       1/2     Physical Exam  Constitutional:       General: He is not in acute distress.     Appearance: Normal appearance. He is not ill-appearing or diaphoretic.   HENT:      Head: Normocephalic and atraumatic.      Nose: Nose normal. No congestion or rhinorrhea.      Mouth/Throat:      Mouth: Mucous membranes are moist.      Pharynx: Oropharynx is clear. No oropharyngeal exudate or posterior oropharyngeal erythema.   Eyes:      Extraocular Movements: Extraocular movements intact.      Pupils: Pupils are equal, round, and reactive to light.   Cardiovascular:      Rate and Rhythm: Normal rate.      Pulses: Normal pulses.      Heart sounds: Normal heart sounds. No murmur heard.  Pulmonary:      Effort: Pulmonary effort is normal. No respiratory distress.      Breath sounds: Normal breath sounds. No wheezing or rhonchi.      Comments: Diminished breath sounds bilateral lower lobes otherwise clear to auscultation  Right sternal area with bulging wire from previous sternotomy with area of tenderness though no erythema or warmth noted.  Chest:      Chest wall: No tenderness.   Abdominal:      General: Abdomen is flat. Bowel sounds are normal.      Palpations: Abdomen is soft.   Musculoskeletal:         General: Normal range of motion.   Skin:     General: Skin is warm.   Neurological:      General: No focal deficit present.      Mental Status: He is alert and oriented to person, place, and time.   Psychiatric:         Mood and Affect: Mood normal.         Behavior: Behavior normal.         Thought Content: Thought content normal.         Judgment: Judgment normal.                   Recent Labs   Lab 12/29/23  1555 12/30/23  0531 01/02/24  0507   WBC 9.9 8.7 10.3   HGB 10.9* 10.4* 9.6*   HCT 33.8* 33.5* 30.8*   .0 244.0 246.0                Recent Labs   Lab 12/29/23  1556 12/30/23  0531 12/31/23  0510 01/02/24  0507    139  --  139   K 3.6 3.4* 3.4* 3.5    108  --  106   CO2 28.0 27.0  --  28.0   BUN 13 16  --  9   CREATSERUM 0.59* 0.53*  --  0.45*   CA 8.7 8.9  --  8.7   ALB 2.8* 2.7*  --  2.4*   ALKPHO 105 106  --  110   ALT 26 22  --  19   AST 19 19  --  14*   * 111*  --  90         No results for input(s): \"MG\" in the last 168 hours.           Lab Results   Component Value Date     PHOS 3.5 11/27/2023         No results for input(s): \"PT\", \"INR\", \"PTT\" in the last 168 hours.     No results for input(s): \"ABGPHT\", \"FARQYP3R\", \"SLEES4Q\", \"ABGHCO3\", \"ABGBE\", \"TEMP\", \"TACOS\", \"SITE\", \"DEV\", \"THGB\" in the last 168 hours.     No results for input(s): \"TROP\", \"CKMB\" in the last 168 hours.        Cultures:         Hospital Encounter on 12/29/23   1. Blood Culture     Status: None (Preliminary result)     Collection Time: 12/29/23  3:56 PM     Specimen: Blood,peripheral   Result Value Ref Range     Blood Culture Result No Growth 3 Days N/A                  Component  Ref Range & Units 12/31/23 0510   Procalcitonin  <0.16 ng/mL 0.23 High                Component  Ref Range & Units 1/2/24 0507   Procalcitonin  <0.16 ng/mL 0.34 High         Radiology personally reviewed:  XR VIDEO SWALLOW (CPT=74230)     Result Date: 1/2/2024  PROCEDURE:  XR VIDEO SWALLOW (CPT=74230)  TECHNIQUE:  Video fluoroscopic swallowing study was performed in cooperation with the speech pathologist.  Barium of varying consistencies was administered orally with patient in lateral projection.  COMPARISON:  [facility] , XR, XR VIDEO SWALLOW (CPT=74230), 11/18/2022, 2:09 PM.  INDICATIONS:  Dysphagia  PATIENT STATED HISTORY: (As transcribed by Technologist)  patient states history of trouble swallowing and  coughing while eating and drinking. EGD, esophageal stent removed, and UGI test done within the last few months without improvement to symptoms.   CINE CAPTURES:  7 FLUORSCOPY TIME:  1 minute 45 seconds RADIATION DOSE (AIR KERMA PRODUCT):  65.1 uGy/m2   FINDINGS:  No penetration or aspiration was seen with thin liquids, honey, nectar, purees, or solids.  PLEASE SEE SPEECH PATHOLOGIST REPORT FOR FULL ASSESSMENT OF SWALLOWING MECHANISM.   LOCATION:  [facility]    Dictated by (CST): Yahir To MD on 1/02/2024 at 9:18 AM     Finalized by (CST): Yahir To MD on 1/02/2024 at 9:19 AM        XR CHEST AP PORTABLE  (CPT=71045)     Result Date: 1/2/2024  CONCLUSION:  Bilateral interstitial infiltrates with interval worsening infiltrates in the left lung compared to the previous exam from 12/29/2023.  Differential considerations include atypical pneumonia and other inflammatory interstitial lung disease. 2. Cardiomegaly without edema.   LOCATION:  [facility]      Dictated by (CST): Cuong Wan MD on 1/02/2024 at 7:57 AM     Finalized by (CST): Cuong Wan MD on 1/02/2024 at 8:01 AM              Patient Active Problem List   Diagnosis    Primary hypertension    Hyperlipidemia with target low density lipoprotein (LDL) cholesterol less than 70 mg/dL    S/P CABG x 4    Nontoxic multinodular goiter    Pulmonary nodules    Thrombophlebitis leg    BRANDAN (generalized anxiety disorder)    Right shoulder Arthroscopy acromioplasty ,distal  clavicle resection, rotator cuff/labral debridment  Global 05/13/2021    Right bicipital tenosynovitis    Malignant neoplasm of esophagus (HCC)    Esophageal anastomotic leak    Esophageal obstruction    On total parenteral nutrition (TPN)    Mechanical complication of esophagostomy (HCC)    Abdominal pain of unknown etiology    Migration of esophageal stent    Gastroparesis    Aspiration pneumonia of both lungs due to gastric secretions (HCC)    Esophageal carcinoma (HCC)    Normocytic anemia     Esophageal fistula    Postnasal drip    Acute cough    Acquired bronchoesophageal fistula (HCC)    Pneumonia of both lower lobes due to infectious organism    Malignant neoplasm of pancreas (HCC)    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 difficile colitis      Assessment:  Sepsis secondary to C diff positive and COVID positive.  Left lung infiltrate noted, which is new compared to CT from 12/16, aspiration versus HCAP; CT Chest 12/30/2023 : increasing ground-glass opacities throughout the left upper lobe as well as to a lesser degree within the left lower lobe.  This is seen over a background of reticular nodular opacities that are diffusely seen throughout the lower lobes and right middle lobe.  Procalcitonin minimally elevated, strep pneumonia and Legionella urinary antigen negative.  Histoplasma galactomannan and Blastomyces dermatitidis antigen pending  C Diff positive diarrhea: Resolving  COVID +   Possible HCAP  Right upper chest next to sternum tender area consistent with musculoskeletal pain no erythema warmth noted in the area.  Recurrent bronchitis: Suspect due to aspiration: Clinically improving  Esophageal cancer c/b post op anasthomatoc leak  Dysphagia: Video swallow study by speech pathology did not show dysphagia  Pancreatic mass s/p stent insertion that was removed on 12/12/23        Plan:     Antibiotics per infectious disease  ID follow-up  Continue Breo Ellipta 100-25 mcg 1 puff daily  Change DuoNebs 4 times daily as needed  If cough remains persistent, may consider bronchoscopy for endobronchial survey  Discharge planning     Dusty  MD Todd     Thank You for allowing me to participate in this patient's care      Dusty Brooks MD             Electronically signed by Dusty Brooks MD at 1/2/2024  1:45 PM    1/3  Antibiotics:#5  Cefazolin   PO vanc#4           Subjective:  comfortable     Objective:  Temp:  [97.5 °F (36.4 °C)-98 °F (36.7 °C)] 97.8 °F (36.6 °C)  Pulse:  [58-61] 60  Resp:  [20] 20  BP: (116-152)/(54-74) 150/74  SpO2:  [91 %-93 %] 91 %     General: awake alert  Vital signs:Temp:  [97.5 °F (36.4 °C)-98 °F (36.7 °C)] 97.8 °F (36.6 °C)  Pulse:  [58-61] 60  Resp:  [20] 20  BP: (116-152)/(54-74) 150/74  SpO2:  [91 %-93 %] 91 %  HEENT: Moist mucous membranes. Extraocular muscles are intact.  Neck: supple no masses  Respiratory: Non labored, symmetric excursion, normal respirations  Cardiovascular: no irregularities in rhythm  Abdomen: Soft, nontender, nondistended.   Musculoskeletal: joints: no swelling   Integument: No lesions. No erythema. No open wounds.  Labs:      COVID-19 Lab Results     COVID-19        Lab Results   Component Value Date     COVID19 Not Detected 12/30/2023     COVID19 Detected (A) 12/16/2023     COVID19 Detected (A) 12/10/2023         Pro-Calcitonin        Recent Labs   Lab 12/30/23  0531 12/31/23  0510 01/02/24  0507   PCT 0.20* 0.23* 0.34*         Cardiac  No results for input(s): \"TROP\", \"PBNP\" in the last 168 hours.     Creatinine Kinase  No results for input(s): \"CK\" in the last 168 hours.     Inflammatory Markers  No results for input(s): \"CRP\", \"MYAH\", \"LDH\", \"DDIMER\" in the last 168 hours.         Recent Labs   Lab 01/03/24  0607   RBC 2.92*   HGB 8.8*   HCT 28.2*   MCV 96.6   MCH 30.1   MCHC 31.2   RDW 13.2   NEPRELIM 6.36   WBC 8.1   .0                    Recent Labs   Lab 12/29/23  1556 12/30/23  0531 12/31/23  0510 01/02/24  0507 01/03/24  0607   * 111*  --  90 71   BUN 13 16  --  9 6*   CREATSERUM 0.59* 0.53*  --  0.45* 0.35*   CA 8.7 8.9  --  8.7 8.2*   ALB 2.8* 2.7*  --  2.4*  --      139  --  139 140   K 3.6 3.4* 3.4* 3.5 3.4*    108  --  106 107   CO2 28.0 27.0  --  28.0 28.0   ALKPHO 105 106  --  110  --    AST 19 19  --  14*  --    ALT 26 22  --  19  --    BILT 0.3 0.3  --  0.4  --    TP 7.4 7.2  --  6.7  --          No results found for: \"VANCT\"  Microbiology           Hospital Encounter on 12/29/23   1. Blood Culture     Status: None (Preliminary result)     Collection Time: 12/29/23  3:56 PM     Specimen: Blood,peripheral   Result Value Ref Range     Blood Culture Result No Growth 4 Days N/A            Radiology  CT reviewed        Problem list reviewed:      Patient Active Problem List   Diagnosis    Primary hypertension    Hyperlipidemia with target low density lipoprotein (LDL) cholesterol less than 70 mg/dL    S/P CABG x 4    Nontoxic multinodular goiter    Pulmonary nodules    Thrombophlebitis leg    BRANDAN (generalized anxiety disorder)    Right shoulder Arthroscopy acromioplasty ,distal  clavicle resection, rotator cuff/labral debridment  Global 05/13/2021    Right bicipital tenosynovitis    Malignant neoplasm of esophagus (HCC)    Esophageal anastomotic leak    Esophageal obstruction    On total parenteral nutrition (TPN)    Mechanical complication of esophagostomy (HCC)    Abdominal pain of unknown etiology    Migration of esophageal stent    Gastroparesis    Aspiration pneumonia of both lungs due to gastric secretions (HCC)    Esophageal carcinoma (HCC)    Normocytic anemia    Esophageal fistula    Postnasal drip    Acute cough    Acquired bronchoesophageal fistula (HCC)    Pneumonia of both lower lobes due to infectious organism    Malignant neoplasm of pancreas (HCC)    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 difficile colitis                  ASSESSMENT/PLAN:  1.  Staph aureus isolated from sputum culture  -recent COVID infection  -aspiration risk gastric outlet obstruction  IV cefazolin  Po when dc     2. CDIFF, diarrhea improved  Po vancomycin beyond other abx     3. Gastric adenocarcinoma sp resection        Conner Bell MD, MD  [facility]  [phone]               Electronically signed by Conner Bell MD at 1/3/2024  8:36 AM

## 2024-01-09 ENCOUNTER — APPOINTMENT (OUTPATIENT)
Dept: GENERAL RADIOLOGY | Facility: HOSPITAL | Age: 54
End: 2024-01-09
Attending: NURSE PRACTITIONER
Payer: COMMERCIAL

## 2024-01-09 ENCOUNTER — APPOINTMENT (OUTPATIENT)
Dept: GENERAL RADIOLOGY | Facility: HOSPITAL | Age: 54
End: 2024-01-09
Attending: INTERNAL MEDICINE
Payer: COMMERCIAL

## 2024-01-09 PROBLEM — R05.2 SUBACUTE COUGH: Status: ACTIVE | Noted: 2023-11-09

## 2024-01-09 PROBLEM — R05.2 SUBACUTE COUGH: Status: ACTIVE | Noted: 2023-01-01

## 2024-01-09 LAB
ANION GAP SERPL CALC-SCNC: 2 MMOL/L (ref 0–18)
ATRIAL RATE: 65 BPM
BUN BLD-MCNC: 10 MG/DL (ref 9–23)
CALCIUM BLD-MCNC: 7.9 MG/DL (ref 8.5–10.1)
CHLORIDE SERPL-SCNC: 107 MMOL/L (ref 98–112)
CO2 SERPL-SCNC: 32 MMOL/L (ref 21–32)
CREAT BLD-MCNC: 0.37 MG/DL
EGFRCR SERPLBLD CKD-EPI 2021: 133 ML/MIN/1.73M2 (ref 60–?)
GLUCOSE BLD-MCNC: 92 MG/DL (ref 70–99)
OSMOLALITY SERPL CALC.SUM OF ELEC: 291 MOSM/KG (ref 275–295)
P AXIS: 35 DEGREES
P-R INTERVAL: 150 MS
POTASSIUM SERPL-SCNC: 3.2 MMOL/L (ref 3.5–5.1)
Q-T INTERVAL: 410 MS
QRS DURATION: 86 MS
QTC CALCULATION (BEZET): 426 MS
R AXIS: 25 DEGREES
SODIUM SERPL-SCNC: 141 MMOL/L (ref 136–145)
T AXIS: 75 DEGREES
VENTRICULAR RATE: 65 BPM

## 2024-01-09 PROCEDURE — 99255 IP/OBS CONSLTJ NEW/EST HI 80: CPT | Performed by: INTERNAL MEDICINE

## 2024-01-09 PROCEDURE — 99232 SBSQ HOSP IP/OBS MODERATE 35: CPT | Performed by: HOSPITALIST

## 2024-01-09 PROCEDURE — 74220 X-RAY XM ESOPHAGUS 1CNTRST: CPT | Performed by: NURSE PRACTITIONER

## 2024-01-09 PROCEDURE — 74230 X-RAY XM SWLNG FUNCJ C+: CPT | Performed by: INTERNAL MEDICINE

## 2024-01-09 RX ORDER — ALBUTEROL SULFATE 2.5 MG/3ML
2.5 SOLUTION RESPIRATORY (INHALATION)
Status: DISCONTINUED | OUTPATIENT
Start: 2024-01-09 | End: 2024-01-13

## 2024-01-09 RX ORDER — SODIUM CHLORIDE FOR INHALATION 3 %
3 VIAL, NEBULIZER (ML) INHALATION
Status: DISCONTINUED | OUTPATIENT
Start: 2024-01-09 | End: 2024-01-13

## 2024-01-09 RX ORDER — POTASSIUM CHLORIDE 20 MEQ/1
40 TABLET, EXTENDED RELEASE ORAL ONCE
Status: COMPLETED | OUTPATIENT
Start: 2024-01-09 | End: 2024-01-09

## 2024-01-09 RX ORDER — METOCLOPRAMIDE 5 MG/1
10 TABLET ORAL
Status: DISCONTINUED | OUTPATIENT
Start: 2024-01-09 | End: 2024-01-13

## 2024-01-09 RX ORDER — DIPHENHYDRAMINE HYDROCHLORIDE 12.5 MG/5ML
12.5 SOLUTION ORAL 4 TIMES DAILY PRN
Status: DISCONTINUED | OUTPATIENT
Start: 2024-01-09 | End: 2024-01-13

## 2024-01-09 RX ORDER — METOCLOPRAMIDE HYDROCHLORIDE 5 MG/ML
10 INJECTION INTRAMUSCULAR; INTRAVENOUS
Status: DISCONTINUED | OUTPATIENT
Start: 2024-01-09 | End: 2024-01-13

## 2024-01-09 NOTE — PLAN OF CARE
NURSING ADMISSION NOTE    Patient admitted via cart  Oriented to room  Safety precautions initiated   Bed in low position  Call light in reach     Pt alert and oriented x 4. Afebrile. Denies SOB. C/o chest pain, PRN Morphine given Q2. All medications given per MAR. Safety precautions in place. Call light within reach.

## 2024-01-09 NOTE — VIDEO SWALLOW STUDY NOTE
ADULT VIDEOFLUOROSCOPIC SWALLOWING STUDY    Admission Date: 1/8/2024  Evaluation Date: 01/09/24  Radiologist: Singer ALEXANDRE   Diet Recommendations - Solids: Regular  Diet Recommendations - Liquids: Thin Liquids    Further Follow-up:  Follow Up Needed (Documentation Required): No     Compensatory Strategies Recommended: Small bites and sips  Aspiration Precautions: Upright position (GERD precautions)  Medication Administration Recommendations: No restrictions  Treatment Plan/Recommendations: ENT consult (laryngologist outpatient)    HISTORY   Background/Objective Information: Patient is a 54 y/o male admitted with dysphagia and chest pain. Patient with complicated IG history including esophageal adenocarcinoma, esophagectomy, and esophageal stent placement. Patient reported chronic cough with PO intake of thin liquids. He denied overt s/s of aspiration with solids. VFSS completed 1/2/24 which did not reveal evidence of aspiration, but patient did exhibit occasional cough response despite absence of aspiration.    Problem List  Principal Problem:    Dysphagia, unspecified type  Active Problems:    Coronary artery disease involving native coronary artery of native heart without angina pectoris    Chest pain    Shortness of breath    Hypokalemia    Dysphagia      Past Medical History  Past Medical History:   Diagnosis Date    Back problem     Belching 02/01/2022    Black stools 02/01/2022    Borderline diabetes     Dx in 8/2013 - HgA1C 6.2%    C. difficile diarrhea 10/23/2022    pt treated and without symptoms    Chest pain 02/01/2022    Coronary artery disease     On 8/16/13: CABG x 4 with LIMA to LAD and SVG to diagonal, OM and PDA    Decorative tattoo 01/01/2000    Depression     Esophageal cancer (HCC)     completed chemo    Esophageal reflux     Essential hypertension     Exposure to medical diagnostic radiation     last tx 8/18/2022    Frequent urination 01/01/2013    Gastroparesis     Heartburn  02/01/2022    High blood pressure     High cholesterol 08/01/2013    Found when I had quadruple bypass    History of COVID-19 10/16/2022    asymptomatic - pt was dx during a hospitalization for another diagnosis. No continued symptoms    History of stomach ulcers     Hyperlipidemia     Hyperlipidemia LDL goal < 70     Indigestion 02/01/2022    Morbid obesity with BMI of 40.0-44.9, adult (HCC)     Muscle weakness     Nontoxic multinodular goiter     Dx in 8/2013: pt was told that imaging showed thyroid cysts per PCP    Pancreatic cancer (HCC)     last dose 12/4/2023 is scheduled for another round 12/27/23    Peripheral vascular disease (HCC)     pt denies    Personal history of antineoplastic chemotherapy     for esophageal cancer/completed    Personal history of antineoplastic chemotherapy     pancreatic cancer    Problems with swallowing 02/01/2022    Pulmonary nodules     Dx in 8/2013: CT chest showed small bilateral fissural-based lung nodules less than 1 cm    S/P CABG x 4     On 8/16/13: CABG x 4 with LIMA to LAD and SVG to diagonal, OM and PDA    Shortness of breath     Vomiting 02/01/2022       Current Diet Consistency: NPO  Prior Level of Function: Independent     History of Recent: No recent respiratory difficulty     Imaging results:   CXR 1/8/24  CONCLUSION:    1. There is some interval clearing of opacity from left upper lobe which is probably improved pneumonia.   2. Persistent right costophrenic angle blunting is consistent with effusion or atelectasis.   3. There is a stent in the right mainstem bronchus which is stable.         LOCATION:                    Dictated by (CST): Kraig Tristan MD on 1/08/2024 at 7:31 PM       Finalized by (CST): Kraig Tristan MD on 1/08/2024 at 7:32 PM     Reason for Referral: R/O aspiration    Family/Patient Goals: none stated     ASSESSMENT   DYSPHAGIA ASSESSMENT  Test completed in conjunction with Radiologist.  Patient Positioned: Upright;Midline.  Patient Viewed:  Lateral.   .  Consistencies Presented: Thin liquids;Hard solid to assess oropharyngeal swallow function and assess for compensatory strategies to improve safe swallow function.    THIN LIQUIDS  Method of Presentation: Cup;Straw  Oral Phase of Swallow (VFSS - Thin Liquids): Within Functional Limits  Triggered at: Pyriform sinuses (x1)  Residue Severity, Location: Trace;Throughout pharynx  Laryngeal Penetration: Trace;Transient  Tracheal Aspiration: None  Cough/Throat Clear Response: Yes (despite absence of aspiration)              HARD SOLID  Oral Phase of Swallow (VFSS - Hard Solid): Within Functional Limits  Triggered at: Valleculae  Laryngeal Penetration: None  Tracheal Aspiration: None  Penetration Aspiration Scale Score: Score 2: Material enters the airway, remains above the vocal folds, and is ejected from the airway       Overall Impression:   Results of exam similar to VFSS 1/2/24. Oropharyngeal swallow appears largely intact. Bolus acceptance was adequate without evidence of anterior bolus loss. Mastication and AP bolus transit were thorough and efficient without evidence of oral residue. Pharyngeal swallow initiation was timely. Base of tongue retraction, epiglottic inversion, and hyolaryngeal excursion were WFL. Trace transient penetration observed with thin liquids which cleared with cessation of the swallow. No aspiration visualized. However, patient with strong cough response following consecutive sips of thin liquids via straw.     Patient appears safe to continue a regular diet and thin liquids. Patient may benefit from ENT consult for possible laryngeal hypersensitivity; may be d/t chronic reflux and recent viral respiratory infection. No further inpatient SLP services warranted at this time, but patient may benefit from outpatient SLP in coordination with laryngologist pending work up for laryngeal hypersensitivity. Education provided re: results and recommendations. Images reviewed with patient  following exam.                EDUCATION/INSTRUCTION  Reviewed results and recommendations with patient/family/caregiver.  Agreement/Understanding verbalized and all questions answered to their apparent satisfaction.    INTERDISCIPLINARY COMMUNICATION  Reviewed results with Radiologist; agreement verbalized.                     FOLLOW UP  Treatment Plan/Recommendations: ENT consult (laryngologist outpatient)         Thank you for your referral.   If you have any questions, please contact DAKSHA Thomason

## 2024-01-09 NOTE — PROGRESS NOTES
[facility]     Hospitalist Progress Note     Dontae Buddy Cortez  Patient Status:  Inpatient    10/15/1970 MRN [id number]   [facility] 4NW-A Attending Jan Brito MD   Hosp Day # 1 PCP Adrian Horowitz MD     Chief Complaint: Dysphagia    Subjective:     Patient continues to have trouble swallowing.  Denies sob, no abdominal pain.  No other acute complaints at this time.      Objective:    Review of Systems:   A comprehensive review of systems was completed; pertinent positive and negatives stated in subjective.    Vital signs:  Temp:  [97.8 °F (36.6 °C)-99.2 °F (37.3 °C)] 97.8 °F (36.6 °C)  Pulse:  [53-70] 55  Resp:  [15-21] 18  BP: (147-177)/() 147/81  SpO2:  [93 %-98 %] 98 %    Physical Exam:    General: No acute distress, pleasant   Respiratory: no wheezes, no rhonchi  Cardiovascular: S1, S2, regular rate and rhythm  Abdomen: Soft, Non-tender, non-distended, positive bowel sounds  Neuro: No new focal deficits.   Extremities: no edema    Diagnostic Data:    Labs:  Recent Labs   Lab 24  0607 24  1816   WBC 8.1 13.1*   HGB 8.8* 10.5*   MCV 96.6 94.3   .0 283.0       Recent Labs   Lab 24  0607 24  1816 24  0652   GLU 71 116* 92   BUN 6* 12 10   CREATSERUM 0.35* 0.31* 0.37*   CA 8.2* 8.4* 7.9*   ALB  --  2.8*  --     139 141   K 3.4* 3.1* 3.2*    105 107   CO2 28.0 32.0 32.0   ALKPHO  --  108  --    AST  --  21  --    ALT  --  23  --    BILT  --  0.3  --    TP  --  7.1  --        Estimated Creatinine Clearance: 263.4 mL/min (A) (based on SCr of 0.37 mg/dL (L)).    No results for input(s): \"TROP\", \"TROPHS\", \"CK\" in the last 168 hours.    No results for input(s): \"PTP\", \"INR\" in the last 168 hours.               Microbiology    No results found for this visit on 24.      Imaging: Reviewed in Epic.    Medications:    cetirizine  5 mg Oral Daily    fluticasone furoate-vilanterol  1 puff Inhalation Daily    losartan  50 mg Oral Daily     metoprolol succinate ER  50 mg Oral Daily Beta Blocker    pantoprazole  40 mg Oral QAM AC    lipase-protease-amylase (Lip-Prot-Amyl)  25,000 Units Oral TID CC    sertraline  100 mg Oral Daily    vancomycin  125 mg Oral Q6H    enoxaparin  40 mg Subcutaneous Daily    cephalexin  500 mg Oral TID    lidocaine-menthol  2 patch Transdermal Daily       Assessment & Plan:      #Dysphagia  Patient with complex GI history  Barium espophagram today  Speech eval  NPO and IVF's ordered  GI, Pulm consult     #Chest pain  Likely musculoskeletal due to coughing  Improved with lidocaine patch      #Recent staph pneumonia  Continue antibiotics started PTA     #Metastatic esophageal cancer s/p gastroesophagectomy 9/22, metastatic pancreatic cancer   -Follows with Dr. Foster     #H/o bronchoesophageal fistula  -Esophageal stent removed 12/12  -H/o bronchial stent - follows with Dr. Madrid     #C. Diff diagnosed PTA  -Continue PO vanco if tolerating      #CAD with prior CABG  -Continue home meds if tolerating      #GERD  -PPI      Jan Brito MD    Supplementary Documentation:     Quality:  DVT Mechanical Prophylaxis:     Early ambuation  DVT Pharmacologic Prophylaxis   Medication    heparin (Porcine) 100 Units/mL lock flush 500 Units    enoxaparin (Lovenox) 40 MG/0.4ML SUBQ injection 40 mg                Code Status: Full Code  Neumann: No urinary catheter in place  Neumann Duration (in days):   Central line:    SHUBHAM:     Discharge is dependent on: outcome of esophogram   At this point Mr. Cortez is expected to be discharge to: Home    The 21st Century Cures Act makes medical notes like these available to patients in the interest of transparency. Please be advised this is a medical document. Medical documents are intended to carry relevant information, facts as evident, and the clinical opinion of the practitioner. The medical note is intended as peer to peer communication and may appear blunt or direct. It is written in medical language  and may contain abbreviations or verbiage that are unfamiliar.

## 2024-01-09 NOTE — CONSULTS
[facility] Interventional Pulmonology  Report of Consultation    Dontae Cortez Jr. Patient Status:  Inpatient    10/15/1970 MRN [id number]   [facility] 4NW-A Attending Jan Brito MD   Hosp Day # 1 PCP Adrian Horowitz MD     Reason for Consultation:  Cough, chest pain    History of Present Illness:  Dontae Cortez Jr. is a a(n) 53 year old male former smoker with PMH metastatic esophageal adenoca s/p esophagectomy 2022 complicated previously with leak s/p stent placement, bronchoesophagela fistula s/p endobronchial stent at [facility] and esophageal stenting here, CAD/CABG  who was admitted with worsening cough and chest pain. He has been hospitalized several times over the past month with recurrent symptoms.  Presently he complains of the same issues - coughing develops after drinking liquids or eating.  He only has rare cough when not eating/drinking.  When moving his arms he feels there is pain/pressure in mid chest.   Occasional subjective fevers.  Denies any dyspnea. No improvement in symptoms with nebs or inhaler (breo).   He did test positive for COVID 12/10/2023     History:  Past Medical History:   Diagnosis Date    Back problem     Belching 2022    Black stools 2022    Borderline diabetes     Dx in 2013 - HgA1C 6.2%    C. difficile diarrhea 10/23/2022    pt treated and without symptoms    Chest pain 2022    Coronary artery disease     On 13: CABG x 4 with LIMA to LAD and SVG to diagonal, OM and PDA    Decorative tattoo 2000    Depression     Esophageal cancer (HCC)     completed chemo    Esophageal reflux     Essential hypertension     Exposure to medical diagnostic radiation     last tx 2022    Frequent urination 2013    Gastroparesis     Heartburn 2022    High blood pressure     High cholesterol 2013    Found when I had quadruple bypass    History of COVID-19 10/16/2022    asymptomatic - pt was dx during a hospitalization for another  diagnosis. No continued symptoms    History of stomach ulcers     Hyperlipidemia     Hyperlipidemia LDL goal < 70     Indigestion 02/01/2022    Morbid obesity with BMI of 40.0-44.9, adult (HCC)     Muscle weakness     Nontoxic multinodular goiter     Dx in 8/2013: pt was told that imaging showed thyroid cysts per PCP    Pancreatic cancer (HCC)     last dose 12/4/2023 is scheduled for another round 12/27/23    Peripheral vascular disease (HCC)     pt denies    Personal history of antineoplastic chemotherapy     for esophageal cancer/completed    Personal history of antineoplastic chemotherapy     pancreatic cancer    Problems with swallowing 02/01/2022    Pulmonary nodules     Dx in 8/2013: CT chest showed small bilateral fissural-based lung nodules less than 1 cm    S/P CABG x 4     On 8/16/13: CABG x 4 with LIMA to LAD and SVG to diagonal, OM and PDA    Shortness of breath     Vomiting 02/01/2022     Past Surgical History:   Procedure Laterality Date    APPENDECTOMY      APPENDECTOMY      ARTHROSCOPY OF JOINT UNLISTED      right shoulder    CABG      On 8/16/13: CABG x 4 with LIMA to LAD and SVG to diagonal, OM and PDA    CARDIAC CATH LAB      On 8/14/2013: cardiac cath showed 3-vessel disease    OTHER SURGICAL HISTORY      1.       Laparoscopic robotic-assisted esophagogastrectomy.     Family History   Problem Relation Age of Onset    Cancer Mother         breast and colon     Diabetes Neg       reports that he quit smoking about 10 years ago. His smoking use included cigarettes. He has never used smokeless tobacco. He reports that he does not currently use alcohol. He reports that he does not use drugs.    Allergies:  No Known Allergies    Medications:   cetirizine  5 mg Oral Daily    fluticasone furoate-vilanterol  1 puff Inhalation Daily    losartan  50 mg Oral Daily    metoprolol succinate ER  50 mg Oral Daily Beta Blocker    pantoprazole  40 mg Oral QAM AC    lipase-protease-amylase (Lip-Prot-Amyl)  25,000  Units Oral TID CC    sertraline  100 mg Oral Daily    vancomycin  125 mg Oral Q6H    enoxaparin  40 mg Subcutaneous Daily    cephalexin  500 mg Oral TID    lidocaine-menthol  2 patch Transdermal Daily     heparin, baclofen, benzonatate, melatonin, acetaminophen, ondansetron, prochlorperazine, polyethylene glycol (PEG 3350), sennosides, bisacodyl, fleet enema, morphINE **OR** morphINE **OR** morphINE    Review of Systems:   Constitutional: Negative for anorexia, chills, fatigue, fevers, malaise, night sweats and weight loss.  Eyes: Negative for visual disturbance, irritation and redness.  Ears, nose, mouth, throat, and face: Negative for hearing loss, tinnitus, nasal congestion, snoring, sore throat, hoarseness and voice change.  Respiratory: Negative for cough, sputum, hemoptysis, chest pain, wheezing, dyspnea on exertion, or stridor.  Cardiovascular: Negative for chest pain, palpitations, irregular heart beats, syncope, fatigue, orthopnea, paroxysmal nocturnal dyspnea, lower extremity edema.  Gastrointestinal: Negative for dysphagia, odynophagia, reflux symptoms, nausea, vomiting, change in bowel habits, diarrhea, constipation and abdominal pain.  Integument/breast: Negative for rash, skin lesions, and pruritus.  Hematologic/lymphatic: Negative for easy bruising, bleeding, and lymphadenopathy.  Musculoskeletal: Negative for myalgias, arthralgias, muscle weakness.  Neurological: Negative for headaches, dizziness, seizures, memory problems, trouble swallowing, speech problems, gait problems and weakness.  : Denies dysuria, hematuria, urinary retention.  Behavioral/Psych: Normal affect, mood, speech. No AVH, No SI/HI    All other review of systems are negative.    Vital signs in last 24 hours:  Patient Vitals for the past 24 hrs:   BP Temp Temp src Pulse Resp SpO2 Height Weight   01/09/24 0850 149/79 97.8 °F (36.6 °C) Oral 50 17 97 % -- --   01/09/24 0435 147/81 97.8 °F (36.6 °C) Oral -- 18 98 % -- --   01/08/24  2346 (!) 164/101 97.9 °F (36.6 °C) Oral 55 16 93 % -- --   01/08/24 2315 (!) 170/86 -- -- 54 15 94 % -- --   01/08/24 2245 (!) 168/97 -- -- 55 16 94 % -- --   01/08/24 2230 (!) 177/90 -- -- 53 20 93 % -- --   01/08/24 2100 (!) 163/83 -- -- 58 19 93 % -- --   01/08/24 2003 (!) 170/81 -- -- 57 21 96 % -- --   01/08/24 1930 (!) 166/84 -- -- 60 17 95 % -- --   01/08/24 1806 (!) 175/101 99.2 °F (37.3 °C) Temporal 70 17 97 % 6' 2\" (1.88 m) 180 lb (81.6 kg)       Intake/Output:    Intake/Output Summary (Last 24 hours) at 1/9/2024 1216  Last data filed at 1/9/2024 0850  Gross per 24 hour   Intake 0 ml   Output 0 ml   Net 0 ml            PHYSICAL EXAM  GEN: Appears alert, comfortable. No acute distress  PSYCH: Normal mood and affect, AAOX3  NEURO: CN 2-12 grossly intact, no focal deficits  HEENT: Atraumatic, normocephalic, EOMI, no icterus/hemorrhage, no conjunctival injection/discharge, nares normal  MOUTH: MMM, good dentition  NECK: Trachea midline, symmetric, no visible masses or scars, no crepitus, normal flexion/extension  CHEST: Normal chest excursion, no visible deformity or scars, no tenderness to palpation  PULMONARY:CTAB no wheeze  COR: RRR no m  GI: NABS X 4, S/NT/ND, No hernias or HSM  LYMPHATIC: No palpable or visible lymphadenopathy in neck, axillae, groin  MSK: Normal strength and sensation in all extremities  EXT: No overt deformities, No C/C/E, 2+ DP/PT pulses b/l   SKIN: No rashes, ulcers, nodules    Lab Data Review:  Recent Labs   Lab 01/03/24  0607 01/08/24  1816 01/09/24  0652   GLU 71 116* 92   BUN 6* 12 10   CREATSERUM 0.35* 0.31* 0.37*   CA 8.2* 8.4* 7.9*    139 141   K 3.4* 3.1* 3.2*    105 107   CO2 28.0 32.0 32.0     Recent Labs   Lab 01/03/24  0607 01/08/24  1816   RBC 2.92* 3.51*   HGB 8.8* 10.5*   HCT 28.2* 33.1*   MCV 96.6 94.3   MCH 30.1 29.9   MCHC 31.2 31.7   RDW 13.2 14.0   NEPRELIM 6.36 10.30*   WBC 8.1 13.1*   .0 283.0     No results for input(s): \"BNP\" in the last 168  hours.  No results for input(s): \"TROP\", \"CK\" in the last 168 hours.  No results for input(s): \"PT\", \"INR\", \"PTT\" in the last 168 hours.    Other Labs:     ABG:  No results for input(s): \"ABGPHT\", \"CIWTGH3K\", \"WUSRB5Q\", \"ABGHCO3\", \"ABGBE\", \"TEMP\", \"TACOS\", \"SITE\", \"DEV\", \"THGB\" in the last 168 hours.    Invalid input(s): \"BGN08[facility]\", \"CHOB\"    Cultures:   No results found for this visit on 01/08/24.  No results for input(s): \"COLORUR\", \"CLARITY\", \"SPECGRAVITY\", \"GLUUR\", \"BILUR\", \"KETUR\", \"BLOODURINE\", \"PHURINE\", \"PROUR\", \"UROBILINOGEN\", \"NITRITE\", \"LEUUR\", \"WBCUR\", \"RBCUR\", \"BACUR\", \"EPIUR\" in the last 168 hours.    Radiology:   XR CHEST AP PORTABLE  (CPT=71045)    Result Date: 1/8/2024  CONCLUSION:  1. There is some interval clearing of opacity from left upper lobe which is probably improved pneumonia. 2. Persistent right costophrenic angle blunting is consistent with effusion or atelectasis. 3. There is a stent in the right mainstem bronchus which is stable.   LOCATION:        Dictated by (CST): Kraig Tristan MD on 1/08/2024 at 7:31 PM     Finalized by (CST): Kraig Tristan MD on 1/08/2024 at 7:32 PM        ASSESSMENT  Postprandial cough. 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  His most recent imaging testing is unrevealing including on review of 1/4 PET/CT and 12/30 CT scan  Chest pain/pressure - unclear if related to above vs musculoskeletal  Bilateral nodules/infiltrates suspect related to aspiration episodes and/or COVID moreso than malignancy  COVID infection/pneumonia - diagnosed 12/10/2023  bronchoesophageal fistula s/p esophageal stenting (now removed 12/12/2023) and endobronchial stenting at [facility]  Cough, dysphagia related to above  Metastatic esophageal adenocarcinoma s/p gastroesphagectomy 9/2022  Anemia, thrombocytopenia  GERD     PLAN  Continue close monitoring of respiratory status, wean o2 for sats >89%  Will continue on nebs for stent patency  Can stop  breo inhaler given lack of any improvement  Continue flutter valve to aid secretion clearance  GERD management per GI  Antibiotics as per ID  Await GI input today; if plans for endoscopy, would plan for bronchoscopy at the same time    Thank you for the consultation.  Will follow with you.    Chapo Madrid MD

## 2024-01-09 NOTE — PROGRESS NOTES
Went for xray of esophagus w/ negative result, Vedio swallow is normal,Medicated q 2 hrs for abd pain, Ambulates in the hallways, Tolerated solid food, Assisted needs.

## 2024-01-09 NOTE — ED QUICK NOTES
Orders for admission, patient is aware of plan and ready to go upstairs. Any questions, please call ED RN ALON at extension [facility].     Patient Covid vaccination status: Fully vaccinated and immunocompromised     COVID Test Ordered in ED: None    COVID Suspicion at Admission: N/A    Running Infusions:      Mental Status/LOC at time of transport: A&OX3. WITH EPIGASTRIC PAIN. POTASSIUM INFUSING. MORPHINE GIVEN X2. STEADY GATE.    Other pertinent information:   CIWA score: N/A   NIH score:  N/A

## 2024-01-09 NOTE — H&P
[facility]     Dontae Cortez Jr. Patient Status:  Emergency    10/15/1970 MRN [id number]   Location [facility] EMERGENCY DEPARTMENT Attending Jose E Chung MD   Hosp Day # 0 PCP Adrian Horowitz MD     Chief Complaint: Dysphagia, chest pain    Subjective:    History of Present Illness:     Dontae Cortez Jr. is a 53 year old male with a PMH of recently diagnosed C. Diff, esophageal adenocarcinoma s/p esophagectomy with J-tube complicated by bronchoesophageal fistula s/p bronchial stent and esophageal stent with recent esophageal stent removal  presented to ED due to dysphagia and chest pain. Patient reports difficulty swallowing liquids. Liquid intake results in coughing/gagging. Patient wakes up at night with sensation that he is drowning due to saliva in throat. Patient also reports pain in chest that is worse with movement. Denies palpitations or diaphoresis.     History/Other:    Past Medical History:  Past Medical History:   Diagnosis Date    Back problem     Belching 2022    Black stools 2022    Borderline diabetes     Dx in 2013 - HgA1C 6.2%    C. difficile diarrhea 10/23/2022    pt treated and without symptoms    Chest pain 2022    Coronary artery disease     On 13: CABG x 4 with LIMA to LAD and SVG to diagonal, OM and PDA    Decorative tattoo 2000    Depression     Esophageal cancer (HCC)     completed chemo    Esophageal reflux     Essential hypertension     Exposure to medical diagnostic radiation     last tx 2022    Frequent urination 2013    Gastroparesis     Heartburn 2022    High blood pressure     High cholesterol 2013    Found when I had quadruple bypass    History of COVID-19 10/16/2022    asymptomatic - pt was dx during a hospitalization for another diagnosis. No continued symptoms    History of stomach ulcers     Hyperlipidemia     Hyperlipidemia LDL goal < 70     Indigestion 2022     Morbid obesity with BMI of 40.0-44.9, adult (HCC)     Muscle weakness     Nontoxic multinodular goiter     Dx in 8/2013: pt was told that imaging showed thyroid cysts per PCP    Pancreatic cancer (HCC)     last dose 12/4/2023 is scheduled for another round 12/27/23    Peripheral vascular disease (HCC)     pt denies    Personal history of antineoplastic chemotherapy     for esophageal cancer/completed    Personal history of antineoplastic chemotherapy     pancreatic cancer    Problems with swallowing 02/01/2022    Pulmonary nodules     Dx in 8/2013: CT chest showed small bilateral fissural-based lung nodules less than 1 cm    S/P CABG x 4     On 8/16/13: CABG x 4 with LIMA to LAD and SVG to diagonal, OM and PDA    Shortness of breath     Vomiting 02/01/2022     Past Surgical History:   Past Surgical History:   Procedure Laterality Date    APPENDECTOMY      APPENDECTOMY      ARTHROSCOPY OF JOINT UNLISTED      right shoulder    CABG      On 8/16/13: CABG x 4 with LIMA to LAD and SVG to diagonal, OM and PDA    CARDIAC CATH LAB      On 8/14/2013: cardiac cath showed 3-vessel disease    OTHER SURGICAL HISTORY      1.       Laparoscopic robotic-assisted esophagogastrectomy.      Family History:   Family History   Problem Relation Age of Onset    Cancer Mother         breast and colon     Diabetes Neg      Social History:    reports that he quit smoking about 10 years ago. His smoking use included cigarettes. He has never used smokeless tobacco. He reports that he does not currently use alcohol. He reports that he does not use drugs.     Allergies: No Known Allergies    Medications:    Current Facility-Administered Medications on File Prior to Encounter   Medication Dose Route Frequency Provider Last Rate Last Admin    [COMPLETED] heparin (Porcine) 100 Units/mL lock flush 500 Units  5 mL Intracatheter Once Senia Foster MD   500 Units at 01/04/24 1123    [COMPLETED] potassium chloride 40 mEq in 250mL sodium chloride  0.9% IVPB premix  40 mEq Intravenous Once Megan Arshad MD 62.5 mL/hr at 24 1436 40 mEq at 24 1436    [COMPLETED] iopamidol 76% (ISOVUE-370) injection for power injector  80 mL Intravenous ONCE PRN Sophia Gibson MD   80 mL at 23 0248    [COMPLETED] potassium chloride 40 mEq in 250mL sodium chloride 0.9% IVPB premix  40 mEq Intravenous Once Jose Roberto Myles DO 62.5 mL/hr at 23 1328 40 mEq at 23 1328    [COMPLETED] ipratropium-albuterol (Duoneb) 0.5-2.5 (3) MG/3ML inhalation solution 3 mL  3 mL Nebulization Once Konrad Cuba MD   3 mL at 23 1617    [COMPLETED] ketorolac (Toradol) 15 MG/ML injection 15 mg  15 mg Intravenous Once Konrad Cuba MD   15 mg at 23 1649    [] ondansetron (Zofran) 4 MG/2ML injection 4 mg  4 mg Intravenous Q4H PRN Konrad Cuba MD        [COMPLETED] piperacillin-tazobactam (Zosyn) 3.375 g in dextrose 5% 100 mL IVPB-ADDV  3.375 g Intravenous Once Konrad Cuba MD   Stopped at 23 1759    [COMPLETED] ketorolac (Toradol) 15 MG/ML injection 15 mg  15 mg Intravenous Once Konrad Cuba MD   15 mg at 23 1814    [COMPLETED] morphINE PF 4 MG/ML injection 4 mg  4 mg Intravenous Once Konrad Cuba MD   4 mg at 23 1952    [COMPLETED] morphINE PF 4 MG/ML injection 4 mg  4 mg Intravenous Once Konrad Cuba MD   4 mg at 23 2153    [COMPLETED] trastuzumab-qyyp (Trazimera) 504 mg in sodium chloride 0.9% 274 mL infusion  6 mg/kg (Treatment Plan Recorded) Intravenous Once Nataly Siddiqui APRN   Stopped at 23 0930    [COMPLETED] sodium chloride 0.9 % IV bolus 500 mL  500 mL Intravenous Once Freddy Hernandez MD   Stopped at 23    [COMPLETED] ipratropium-albuterol (Duoneb) 0.5-2.5 (3) MG/3ML inhalation solution 3 mL  3 mL Nebulization Once Freddy Hernandez MD   3 mL at 23 1950    [COMPLETED] HYDROmorphone (Dilaudid) 1 MG/ML injection  0.5 mg  0.5 mg Intravenous Q30 Min PRN Freddy Hernandez MD   0.5 mg at 23    [COMPLETED] iohexol (Omnipaque) 350 MG/ML injection via power injector 100 mL  100 mL Intravenous ONCE PRN Freddy Hernandez MD   100 mL at 23 214    [COMPLETED] morphINE PF 4 MG/ML injection 4 mg  4 mg Intravenous Q30 Min PRN Wero Chiang MD   4 mg at 23 0813    [COMPLETED] magnesium sulfate in sterile water for injection 2 g/50mL IVPB premix 2 g  2 g Intravenous Once Megan Arshad MD 50 mL/hr at 23 1413 2 g at 23 1413    [] sodium chloride 0.9% infusion   Intravenous Continuous Nikia Fairchild MD        [] HYDROmorphone (Dilaudid) 1 MG/ML injection 1 mg  1 mg Intravenous Q30 Min PRN Nikia Fairchild MD        [] ondansetron (Zofran) 4 MG/2ML injection 4 mg  4 mg Intravenous Q4H PRN Nikia Fairchild MD        [COMPLETED] iopamidol 76% (ISOVUE-370) injection for power injector  100 mL Intravenous ONCE PRN Nikia Fairchild MD   100 mL at 12/10/23 2109    [COMPLETED] hydrALAzine (Apresoline) 20 mg/mL injection 10 mg  10 mg Intravenous Once Nikia Fairchild MD   10 mg at 12/10/23 2231    [COMPLETED] pembrolizumab (Keytruda) 400 mg in sodium chloride 0.9% 116 mL IVPB  400 mg Intravenous Once Grecia Angelo APRN   Stopped at 23 1243    [COMPLETED] trastuzumab-qyyp (Trazimera) 504 mg in sodium chloride 0.9% 274 mL infusion  6 mg/kg (Treatment Plan Recorded) Intravenous Once Grecia Angelo APRN   Stopped at 23 1319    [COMPLETED] magnesium sulfate in sterile water for injection 2 g/50mL IVPB premix 2 g  2 g Intravenous Once Glen Myles MD 50 mL/hr at 23 2 g at 23    [COMPLETED] morphINE PF 4 MG/ML injection 4 mg  4 mg Intravenous Once Dede Jacques MD   4 mg at 23 1125    [COMPLETED] morphINE PF 2 MG/ML injection 2 mg  2 mg Intravenous Once Dede Jacques MD   2 mg at 23 1337    [COMPLETED] iopamidol 76%  (ISOVUE-370) injection for power injector  75 mL Intravenous ONCE PRN Dede Jacques MD   75 mL at 11/26/23 1347    [COMPLETED] piperacillin-tazobactam (Zosyn) 4.5 g in dextrose 5% 100 mL IVPB-ADDV  4.5 g Intravenous Once Dede Jacques  mL/hr at 11/26/23 1517 4.5 g at 11/26/23 1517    [COMPLETED] vancomycin (Vancocin) 1.25 g in sodium chloride 0.9% 250mL IVPB premix  15 mg/kg Intravenous Once Glen Myles .7 mL/hr at 11/26/23 1758 1,250 mg at 11/26/23 1758    [COMPLETED] morphINE PF 2 MG/ML injection 2 mg  2 mg Intravenous Once Dede Jacques MD   2 mg at 11/26/23 1528    [COMPLETED] influenza vaccine split quad (Fluzone QIV) 0.5 mL IM injection (ages 6 months to 64 years) 0.5 mL  0.5 mL Intramuscular Prior to discharge Eitan Landeros MD   0.5 mL at 11/11/23 1240    [COMPLETED] potassium chloride 20 mEq/100mL IVPB premix 20 mEq  20 mEq Intravenous Once Eitan Landeros MD   Stopped at 11/10/23 1751    [COMPLETED] magnesium sulfate in sterile water for injection 2 g/50mL IVPB premix 2 g  2 g Intravenous Once Eitan Landeros MD   Stopped at 11/10/23 1751    [COMPLETED] iron sucrose (Venofer) 20 mg/mL injection 200 mg  200 mg Intravenous Daily Senia Foster MD   200 mg at 11/10/23 0927    [COMPLETED] magnesium sulfate in sterile water for injection 2 g/50mL IVPB premix 2 g  2 g Intravenous Once Eitan Landeros MD 50 mL/hr at 11/08/23 0834 2 g at 11/08/23 0834    [COMPLETED] iopamidol 76% (ISOVUE-370) injection for power injector  80 mL Intravenous ONCE PRN Eitan Landeros MD   80 mL at 11/08/23 1612    [COMPLETED] morphINE PF 4 MG/ML injection 4 mg  4 mg Intravenous Once Freddy Duff MD   4 mg at 11/06/23 1519    [COMPLETED] ondansetron (Zofran) 4 MG/2ML injection 4 mg  4 mg Intravenous Once Freddy Duff MD   4 mg at 11/06/23 1519    [COMPLETED] trastuzumab-qyyp (Trazimera) 588 mg in sodium chloride 0.9% 278 mL infusion  6 mg/kg (Treatment Plan Recorded) Intravenous Once Cristian,  MD Senia   Stopped at 10/31/23 1442     Current Outpatient Medications on File Prior to Encounter   Medication Sig Dispense Refill    vancomycin 125 MG Oral Cap Take 1 capsule (125 mg total) by mouth every 6 (six) hours for 10 days, THEN 1 capsule (125 mg total) 2 (two) times a day for 15 days. 70 capsule 0    cephalexin 500 MG Oral Cap Take 1 capsule (500 mg total) by mouth 3 (three) times daily for 10 days. 30 capsule 0    ipratropium-albuterol 0.5-2.5 (3) MG/3ML Inhalation Solution Take 3 mL by nebulization every 6 (six) hours as needed. 120 each 1    fluticasone furoate-vilanterol 100-25 MCG/ACT Inhalation Aerosol Powder, Breath Activated Inhale 1 puff into the lungs daily. 1 each 2    baclofen 10 MG Oral Tab Take 1 tablet (10 mg total) by mouth 3 (three) times daily as needed.      cetirizine 5 MG Oral Tab Take 1 tablet (5 mg total) by mouth daily.      HYDROcodone-acetaminophen 5-325 MG Oral Tab Take 1-2 tablets by mouth every 8 (eight) hours as needed for Pain. 10 tablet 0    guaiFENesin-codeine 100-10 MG/5ML Oral Solution Take 5 mL by mouth every 6 (six) hours as needed for cough. 237 mL 1    sucralfate (CARAFATE) 1 g Oral Tab Take 1 tablet (1 g total) by mouth 3 (three) times daily as needed. 60 tablet 3    losartan 50 MG Oral Tab Take 1 tablet (50 mg total) by mouth daily. 90 tablet 2    benzonatate 100 MG Oral Cap Take 1 capsule (100 mg total) by mouth 3 (three) times daily as needed for cough. 30 capsule 0    ipratropium-albuterol 0.5-2.5 (3) MG/3ML Inhalation Solution Take 3 mL by nebulization every 8 (eight) hours. 168 mL 1    sodium chloride, hypertonic, 3 % Inhalation Nebu Soln Take 3 mL by nebulization every 8 (eight) hours. 360 mL 1    Omeprazole 40 MG Oral Capsule Delayed Release Take 1 capsule (40 mg total) by mouth 2 (two) times daily before meals. 90 capsule 3    sertraline 100 MG Oral Tab Take 1 tablet (100 mg total) by mouth daily. 90 tablet 1    Pancrelipase, Lip-Prot-Amyl, (CREON)  85616-82010 units Oral Cap DR Particles Take 2 capsules with meals and 1 capsule with snacks 240 capsule 0    metoprolol succinate ER 50 MG Oral Tablet 24 Hr TAKE 1 TABLET BY MOUTH EVERY DAY 90 tablet 1       Review of Systems:   A comprehensive review of systems was completed.    Pertinent positives and negatives noted in the HPI.    Objective:   Physical Exam:    BP (!) 170/81   Pulse 57   Temp 99.2 °F (37.3 °C) (Temporal)   Resp 21   Ht 188 cm (6' 2\")   Wt 180 lb (81.6 kg)   SpO2 96%   BMI 23.11 kg/m²   General: No acute distress, Alert  Respiratory: No rhonchi, no wheezes  Cardiovascular: S1, S2. Regular rate and rhythm  Abdomen: Soft, Non-tender, non-distended, positive bowel sounds  Neuro: No new focal deficits  Extremities: No edema    Results:    Labs:      Labs Last 24 Hours:    Recent Labs   Lab 01/02/24  0507 01/03/24  0607 01/08/24  1816   RBC 3.19* 2.92* 3.51*   HGB 9.6* 8.8* 10.5*   HCT 30.8* 28.2* 33.1*   MCV 96.6 96.6 94.3   MCH 30.1 30.1 29.9   MCHC 31.2 31.2 31.7   RDW 13.2 13.2 14.0   NEPRELIM 8.41* 6.36 10.30*   WBC 10.3 8.1 13.1*   .0 209.0 283.0       Recent Labs   Lab 01/02/24  0507 01/03/24  0607 01/08/24  1816   GLU 90 71 116*   BUN 9 6* 12   CREATSERUM 0.45* 0.35* 0.31*   EGFRCR 125 135 140   CA 8.7 8.2* 8.4*   ALB 2.4*  --  2.8*    140 139   K 3.5 3.4* 3.1*    107 105   CO2 28.0 28.0 32.0   ALKPHO 110  --  108   AST 14*  --  21   ALT 19  --  23   BILT 0.4  --  0.3   TP 6.7  --  7.1       Lab Results   Component Value Date    PT 20.4 (H) 01/30/2014    PT 22.9 (H) 01/13/2014    PT 25.7 (H) 01/08/2014    INR 1.20 11/27/2023    INR 1.08 01/26/2023    INR 1.1 06/20/2022       No results for input(s): \"TROP\", \"TROPHS\", \"CK\" in the last 168 hours.    No results for input(s): \"TROP\", \"PBNP\" in the last 168 hours.    Recent Labs   Lab 01/02/24  0507 01/08/24  1816   PCT 0.34* 0.14       Imaging: Imaging data reviewed in Epic.    Assessment & Plan:      #Dysphagia in  setting of complex GI history outlined below  -Unclear etiology   -NPO, IVF  -SLP eval  -GI and pulmonary eval    #Chest pain  -Suspect musculoskeletal due to coughing  -Lidocaine patch     #Recent staph pneumonia  -Continue antibiotics started PTA     #Metastatic esophageal cancer s/p gastroesophagectomy 9/22, metastatic pancreatic cancer   -Follows with Dr. Foster     #H/o bronchoesophageal fistula  -Esophageal stent removed 12/12  -H/o bronchial stent - follows with Dr. Madrid    #C. Diff diagnosed PTA  -Continue PO vanco if tolerating      #CAD with prior CABG  -Continue home meds if tolerating      #GERD  -PPI    Plan of care discussed with patient and ED physician.    Quinton Concepcion,     Supplementary Documentation:     The 21st Century Cures Act makes medical notes like these available to patients in the interest of transparency. Please be advised this is a medical document. Medical documents are intended to carry relevant information, facts as evident, and the clinical opinion of the practitioner. The medical note is intended as peer to peer communication and may appear blunt or direct. It is written in medical language and may contain abbreviations or verbiage that are unfamiliar.

## 2024-01-09 NOTE — CONSULTS
Gastroenterology Initial Consultation  I have personally seen and examined the patient.    Patient Name: Dontae Cortez .  Referring physician: Dr. Brito  Reason for consultation: Chest pain, coughing  CC: Chest pain, coughing  HPI: This is a 54 yo male with a pmhx of CAD s/p CABG, HTN, dyslipidemia, and distal esophageal adenocarcinoma (dx 6/2022) s/p chemoRT followed by laparoscopic robotic assisted esophagogastrectomy with J-tube placement (9/2022; Dr Lu) recovery c/b anastomotic leak s/p endoscopic closure with stenting (Dr Ritchie) with course further c/b GOO s/p EGD with esophageal stenting + Botox injections (11/22; Dr Ritchie). More recently pt underwent EUS for pancreas head mass (4/2023; Dr Ritchie) with bx revealing mod to poorly differentiated carcinoma--unclear if metastatic esophageal to pancreas vs secondary pancreatic primary, fistula tract opening s/p bronchoscopy (11/9; Dr Madrid), bronchoesophageal fistula with stent placement ([facility]), esophageal stent displacement--s/p EGD with esophageal stent repositioning (11/28/2023), s/p metal stent removal d/t migration on 12/12/23 with Dr. Ritchie.   The patient presented to the ER with recurrent acute onset of sharp mid-sternal chest pain.  These pains are most severe when trying to eat, but may occur at other times.  He also reports that he is often awakened at night with a mouthful of saliva and mucous, which induces choking.  However, when he eats solid food, he has no coughing/choking and reports that the food goes down easily.  He reports that when he tries to drink fluids, he often chokes and coughs.    PMHx:   Past Medical History:   Diagnosis Date    Back problem     Belching 02/01/2022    Black stools 02/01/2022    Borderline diabetes     Dx in 8/2013 - HgA1C 6.2%    C. difficile diarrhea 10/23/2022    pt treated and without symptoms    Chest pain 02/01/2022    Coronary artery disease     On 8/16/13: CABG x 4 with LIMA to LAD and  SVG to diagonal, OM and PDA    Decorative tattoo 01/01/2000    Depression     Esophageal cancer (HCC)     completed chemo    Esophageal reflux     Essential hypertension     Exposure to medical diagnostic radiation     last tx 8/18/2022    Frequent urination 01/01/2013    Gastroparesis     Heartburn 02/01/2022    High blood pressure     High cholesterol 08/01/2013    Found when I had quadruple bypass    History of COVID-19 10/16/2022    asymptomatic - pt was dx during a hospitalization for another diagnosis. No continued symptoms    History of stomach ulcers     Hyperlipidemia     Hyperlipidemia LDL goal < 70     Indigestion 02/01/2022    Morbid obesity with BMI of 40.0-44.9, adult (HCC)     Muscle weakness     Nontoxic multinodular goiter     Dx in 8/2013: pt was told that imaging showed thyroid cysts per PCP    Pancreatic cancer (HCC)     last dose 12/4/2023 is scheduled for another round 12/27/23    Peripheral vascular disease (HCC)     pt denies    Personal history of antineoplastic chemotherapy     for esophageal cancer/completed    Personal history of antineoplastic chemotherapy     pancreatic cancer    Problems with swallowing 02/01/2022    Pulmonary nodules     Dx in 8/2013: CT chest showed small bilateral fissural-based lung nodules less than 1 cm    S/P CABG x 4     On 8/16/13: CABG x 4 with LIMA to LAD and SVG to diagonal, OM and PDA    Shortness of breath     Vomiting 02/01/2022     PSHx:   Past Surgical History:   Procedure Laterality Date    APPENDECTOMY      APPENDECTOMY      ARTHROSCOPY OF JOINT UNLISTED      right shoulder    CABG      On 8/16/13: CABG x 4 with LIMA to LAD and SVG to diagonal, OM and PDA    CARDIAC CATH LAB      On 8/14/2013: cardiac cath showed 3-vessel disease    OTHER SURGICAL HISTORY      1.       Laparoscopic robotic-assisted esophagogastrectomy.     Medications:    [COMPLETED] potassium chloride (K-Dur) tab 40 mEq  40 mEq Oral Once    heparin (Porcine) 100 Units/mL lock flush  500 Units  5 mL Intravenous PRN    [COMPLETED] morphINE PF 4 MG/ML injection 4 mg  4 mg Intravenous Once    baclofen (Lioresal) tab 10 mg  10 mg Oral TID PRN    benzonatate (Tessalon) cap 100 mg  100 mg Oral TID PRN    cetirizine (ZyrTEC) tab 5 mg  5 mg Oral Daily    fluticasone furoate-vilanterol (Breo Ellipta) 100-25 MCG/ACT inhaler 1 puff  1 puff Inhalation Daily    losartan (Cozaar) tab 50 mg  50 mg Oral Daily    metoprolol succinate ER (Toprol XL) 24 hr tab 50 mg  50 mg Oral Daily Beta Blocker    pantoprazole (Protonix) DR tab 40 mg  40 mg Oral QAM AC    pancrelipase (Lip-Prot-Amyl) (Zenpep) DR particles cap 25,000 Units  25,000 Units Oral TID CC    sertraline (Zoloft) tab 100 mg  100 mg Oral Daily    vancomycin (Vancocin) cap 125 mg  125 mg Oral Q6H    melatonin tab 3 mg  3 mg Oral Nightly PRN    sodium chloride 0.9% infusion   Intravenous Continuous    enoxaparin (Lovenox) 40 MG/0.4ML SUBQ injection 40 mg  40 mg Subcutaneous Daily    acetaminophen (Tylenol Extra Strength) tab 500 mg  500 mg Oral Q4H PRN    ondansetron (Zofran) 4 MG/2ML injection 4 mg  4 mg Intravenous Q6H PRN    prochlorperazine (Compazine) 10 MG/2ML injection 5 mg  5 mg Intravenous Q8H PRN    polyethylene glycol (PEG 3350) (Miralax) 17 g oral packet 17 g  17 g Oral Daily PRN    sennosides (Senokot) tab 17.2 mg  17.2 mg Oral Nightly PRN    bisacodyl (Dulcolax) 10 MG rectal suppository 10 mg  10 mg Rectal Daily PRN    fleet enema (Fleet) 7-19 GM/118ML rectal enema 133 mL  1 enema Rectal Once PRN    sodium chloride 0.9% infusion   Intravenous Continuous    morphINE PF 2 MG/ML injection 1 mg  1 mg Intravenous Q2H PRN    Or    morphINE PF 2 MG/ML injection 2 mg  2 mg Intravenous Q2H PRN    Or    morphINE PF 4 MG/ML injection 4 mg  4 mg Intravenous Q2H PRN    cephalexin (Keflex) cap 500 mg  500 mg Oral TID    [] sodium chloride 0.9% infusion   Intravenous Continuous    [] morphINE PF 4 MG/ML injection 4 mg  4 mg Intravenous Q30 Min  PRN    [] ondansetron (Zofran) 4 MG/2ML injection 4 mg  4 mg Intravenous Q4H PRN    [COMPLETED] potassium chloride 40 mEq in 250mL sodium chloride 0.9% IVPB premix  40 mEq Intravenous Once    [COMPLETED] morphINE PF 4 MG/ML injection 4 mg  4 mg Intravenous Once    lidocaine-menthol 4-1 % patch 2 patch  2 patch Transdermal Daily    [COMPLETED] HYDROcodone-acetaminophen (Norco) 5-325 MG per tab 1 tablet  1 tablet Oral Once     Allergies: No Known Allergies  SocHx:  No history of smoking;  The patient drinks alcohol on social occasions; The patient has no history of IV drug use or other illicit substances.  FamHx: The patient has no family history of colon cancer or other gastrointestinal malignancies;  No family history of ulcer disease, or inflammatory bowel disease  ROS:  In addition to the pertinent positives described above: As above    PE: /79 (BP Location: Left arm)   Pulse 50   Temp 97.8 °F (36.6 °C) (Oral)   Resp 17   Ht 6' 2\" (1.88 m)   Wt 180 lb (81.6 kg)   SpO2 97%   BMI 23.11 kg/m²   Gen: AAO x 3, able to speak in complete sentences  HENT: NCAT, EOMI, PERRL, oropharynx is clear with moist mucosal membranes  Eyes: Sclerae are anicteric  Neck:  Supple without nuchal rigidity  Abdomen: Soft, non-tender, non-distended with the presence of bowel sounds; No hepatosplenomegaly; no rebound or guarding; No ascites is clinically apparent; no tympany to percussion  Ext: No peripheral edema or cyanosis  Skin: Warm and dry  Psychiatric: Appropriate mood and congruent affect without obvious depression or anxiety    Labs:   Lab Results   Component Value Date    WBC 13.1 2024    HGB 10.5 2024    HCT 33.1 2024    .0 2024    CREATSERUM 0.37 2024    BUN 10 2024     2024    K 3.2 2024     2024    CO2 32.0 2024    GLU 92 2024    CA 7.9 2024    ALB 2.8 2024    ALKPHO 108 2024    BILT 0.3 2024    AST  21 01/08/2024    ALT 23 01/08/2024    LIP 10 01/08/2024     Recent Labs   Lab 01/03/24  0607 01/08/24  1816 01/09/24  0652   GLU 71 116* 92   BUN 6* 12 10   CREATSERUM 0.35* 0.31* 0.37*   CA 8.2* 8.4* 7.9*    139 141   K 3.4* 3.1* 3.2*    105 107   CO2 28.0 32.0 32.0     Recent Labs   Lab 01/08/24 1816   RBC 3.51*   HGB 10.5*   HCT 33.1*   MCV 94.3   MCH 29.9   MCHC 31.7   RDW 14.0   NEPRELIM 10.30*   WBC 13.1*   .0       Recent Labs   Lab 01/08/24 1816   ALT 23   AST 21     XR CHEST AP PORTABLE (CPT=71045) [[id number]] Collected: 01/08/24 1932   Order Status: Completed Updated: 01/08/24 1933   Narrative:     PROCEDURE:  XR CHEST AP PORTABLE  (CPT=71045)     TECHNIQUE:  AP chest radiograph was obtained.     COMPARISON:  [facility] , XR, XR CHEST AP PORTABLE  (CPT=71045), 1/02/2024, 6:02 AM.     INDICATIONS:  CP and SOB     PATIENT STATED HISTORY: (As transcribed by Technologist)  Pt. with chest pain, difficulty breathing. open heart surgery 13/12/23         FINDINGS:  Left-sided chest port is noted with catheter tip in region of SVC.     There is blunting at right costophrenic angle which is stable and could represent persistent effusion and atelectasis.  Left upper lobe consolidation described previously has improved.     There is a stent in the right mainstem bronchus.     Heart size is within normal limits.  Mediastinum and marika are otherwise unchanged.                   Impression:     CONCLUSION:    1. There is some interval clearing of opacity from left upper lobe which is probably improved pneumonia.  2. Persistent right costophrenic angle blunting is consistent with effusion or atelectasis.  3. There is a stent in the right mainstem bronchus which is stable.        LOCATION:                   Dictated by (CST): Kraig Tristan MD on 1/08/2024 at 7:31 PM      Finalized by (CST): Kraig Tristan MD on 1/08/2024 at 7:32 PM       Impression: This is a 54 yo male with a complex history related to  esophageal adenoCa.  He has had issues with bronchoesophageal fistula formation.  He has an indwelling bronchial stent, but the esophageal stent was removed in December, after confirming no persistent leak.  He has had recurrent admissions related to chest pains and coughing/choking when swallowing.  He has no dysphagia to food.  Overall, I do suspect that his pain and oropharyngeal issues are not esophageal in etiology.  I do think that they are more likely than not, bronchopulmonary in etiology.    Recommendations:   1) Pursue Barium esophagram today, to confirm no luminal stenosis or leak from the esophagus.  2) Speech Pathology evaluation for Video Fluoroscopic Swallow Evaluation

## 2024-01-09 NOTE — ED INITIAL ASSESSMENT (HPI)
C/o sharp chest pain up and down mid; hx of current pneumonia, cdif, port a cath; hx of cancer w/possible new mets in lungs  
Patient arrives to ED via EMS with c/o CP and SOB. Patient with hx of esophageal and pancreatic ca. Known pneumonia. Patient received aspirin, Intranasal fentanyl and ODT Zofran from EMS.   
Detail Level: Detailed
Quality 111:Pneumonia Vaccination Status For Older Adults: Pneumococcal Vaccination Previously Received
Quality 137: Melanoma: Continuity Of Care - Recall System: Patient information entered into a recall system that includes: target date for the next exam specified AND a process to follow up with patients regarding missed or unscheduled appointments
Quality 110: Preventive Care And Screening: Influenza Immunization: Influenza Immunization previously received during influenza season
Quality 130: Documentation Of Current Medications In The Medical Record: Current Medications Documented

## 2024-01-10 ENCOUNTER — TELEPHONE (OUTPATIENT)
Dept: CASE MANAGEMENT | Facility: HOSPITAL | Age: 54
End: 2024-01-10

## 2024-01-10 PROBLEM — J86.0: Status: ACTIVE | Noted: 2023-11-09

## 2024-01-10 PROBLEM — J86.0: Status: ACTIVE | Noted: 2023-01-01

## 2024-01-10 LAB — POTASSIUM SERPL-SCNC: 3.9 MMOL/L (ref 3.5–5.1)

## 2024-01-10 PROCEDURE — 99232 SBSQ HOSP IP/OBS MODERATE 35: CPT | Performed by: INTERNAL MEDICINE

## 2024-01-10 PROCEDURE — 99233 SBSQ HOSP IP/OBS HIGH 50: CPT | Performed by: INTERNAL MEDICINE

## 2024-01-10 RX ORDER — MAGNESIUM HYDROXIDE/ALUMINUM HYDROXICE/SIMETHICONE 120; 1200; 1200 MG/30ML; MG/30ML; MG/30ML
30 SUSPENSION ORAL 4 TIMES DAILY PRN
Status: DISCONTINUED | OUTPATIENT
Start: 2024-01-10 | End: 2024-01-13

## 2024-01-10 RX ORDER — PANTOPRAZOLE SODIUM 40 MG/1
40 TABLET, DELAYED RELEASE ORAL
Status: DISCONTINUED | OUTPATIENT
Start: 2024-01-10 | End: 2024-01-13

## 2024-01-10 RX ORDER — ENOXAPARIN SODIUM 100 MG/ML
40 INJECTION SUBCUTANEOUS NIGHTLY
Status: DISCONTINUED | OUTPATIENT
Start: 2024-01-10 | End: 2024-01-13

## 2024-01-10 NOTE — CM/SW NOTE
Cheryl RN case manager with [facility] calling to offer discharge planning assistance. If needed please call back at [phone].

## 2024-01-10 NOTE — PROGRESS NOTES
[facility]     Hospitalist Progress Note     Dontae Buddy Cortez  Patient Status:  Inpatient    10/15/1970 MRN [id number]   [facility] 4NW-A Attending Jan Brito MD   Hosp Day # 2 PCP Adrian Horowitz MD     Chief Complaint: Dysphagia    Subjective:     Patient continues to have trouble swallowing. Still coughing/choking at times. No F/C. No N/V. States swallowing is worse with liquids but can tolerate solids.     Objective:    Review of Systems:   A comprehensive review of systems was completed; pertinent positive and negatives stated in subjective.    Vital signs:  Temp:  [97.5 °F (36.4 °C)-98.2 °F (36.8 °C)] 97.8 °F (36.6 °C)  Pulse:  [56-65] 60  Resp:  [16-18] 18  BP: (138-160)/(66-76) 153/76  SpO2:  [94 %-100 %] 96 %    Physical Exam:    General: No acute distress, pleasant   Respiratory: no wheezes, no rhonchi  Cardiovascular: S1, S2, regular rate and rhythm  Abdomen: Soft, Non-tender, non-distended, positive bowel sounds  Neuro: No new focal deficits.   Extremities: no edema    Diagnostic Data:    Labs:  Recent Labs   Lab 24  181   WBC 13.1*   HGB 10.5*   MCV 94.3   .0       Recent Labs   Lab 24  1816 24  0652 01/10/24  0541   * 92  --    BUN 12 10  --    CREATSERUM 0.31* 0.37*  --    CA 8.4* 7.9*  --    ALB 2.8*  --   --     141  --    K 3.1* 3.2* 3.9    107  --    CO2 32.0 32.0  --    ALKPHO 108  --   --    AST 21  --   --    ALT 23  --   --    BILT 0.3  --   --    TP 7.1  --   --        Estimated Creatinine Clearance: 263.4 mL/min (A) (based on SCr of 0.37 mg/dL (L)).    No results for input(s): \"TROP\", \"TROPHS\", \"CK\" in the last 168 hours.    No results for input(s): \"PTP\", \"INR\" in the last 168 hours.               Microbiology    Hospital Encounter on 24   1. Blood Culture     Status: None (Preliminary result)    Collection Time: 24  7:37 PM    Specimen: Blood,peripheral   Result Value Ref Range    Blood Culture Result No  Growth 1 Day N/A         Imaging: Reviewed in Epic.    Medications:    pantoprazole  40 mg Oral BID AC    sodium chloride (hypertonic)  3 mL Nebulization TID    albuterol  2.5 mg Nebulization TID    metoclopramide  10 mg Oral TID AC and HS (2200)    Or    metoclopramide  10 mg Intravenous TID AC and HS (2200)    cetirizine  5 mg Oral Daily    fluticasone furoate-vilanterol  1 puff Inhalation Daily    losartan  50 mg Oral Daily    metoprolol succinate ER  50 mg Oral Daily Beta Blocker    lipase-protease-amylase (Lip-Prot-Amyl)  25,000 Units Oral TID CC    sertraline  100 mg Oral Daily    vancomycin  125 mg Oral Q6H    enoxaparin  40 mg Subcutaneous Daily    cephalexin  500 mg Oral TID    lidocaine-menthol  2 patch Transdermal Daily       Assessment & Plan:      #Dysphagia  Patient with complex GI history  Barium espophagram reviewed  GI, Pulm following  ENT to eval for possibel laryngospasm  Continue PPI BID  Reglan QID     #Chest pain  Likely musculoskeletal due to coughing  Improved with lidocaine patch      #Recent staph pneumonia  Continue antibiotics started PTA  Pulm following  Continue nebs  Flutter valve     #Metastatic esophageal cancer s/p gastroesophagectomy 9/22, metastatic pancreatic cancer   -Follows with Dr. Foster     #H/o bronchoesophageal fistula  -Esophageal stent removed 12/12  -H/o bronchial stent - follows with Dr. Madrid     #C. Diff diagnosed PTA  -Continue PO vanco if tolerating      #CAD with prior CABG  -Continue home meds if tolerating      #GERD  -PPI      Charles Maguire MD    Supplementary Documentation:     Quality:  DVT Mechanical Prophylaxis:     Early ambuation  DVT Pharmacologic Prophylaxis   Medication    heparin (Porcine) 100 Units/mL lock flush 500 Units    enoxaparin (Lovenox) 40 MG/0.4ML SUBQ injection 40 mg                Code Status: Full Code  Neumann: No urinary catheter in place  Neumann Duration (in days):   Central line:    SHUBHAM:     Discharge is dependent on: outcome of  esophogram   At this point Mr. Cortez is expected to be discharge to: Home    The 21st Century Cures Act makes medical notes like these available to patients in the interest of transparency. Please be advised this is a medical document. Medical documents are intended to carry relevant information, facts as evident, and the clinical opinion of the practitioner. The medical note is intended as peer to peer communication and may appear blunt or direct. It is written in medical language and may contain abbreviations or verbiage that are unfamiliar.

## 2024-01-10 NOTE — RESPIRATORY THERAPY NOTE
Received patient on room air. Patient given Albuterol and 3 % NaCl as ordered. Patient breath sounds were diminished before treatments. After treatments, breath sounds became rhonchorous. Patient has strong, productive cough with tan, thick secretions. Patient tolerated treatments well.     Chris Daniels, RRT

## 2024-01-10 NOTE — PROGRESS NOTES
Gastroenterology Progress Note  Patient Name: Dontae Cortez Jr.  Chief Complaint: coughing, chest pain  S: The patient reports continued coughing/choking overnight.  He has also had  intermittent chest pain as well, when swallowing liquids.  No n/v. No abdominal pain.  O: /76 (BP Location: Left arm)   Pulse 60   Temp 97.8 °F (36.6 °C) (Oral)   Resp 18   Ht 6' 2\" (1.88 m)   Wt 180 lb (81.6 kg)   SpO2 96%   BMI 23.11 kg/m²   Gen: AAOx3  Ext: No edema or cyanosis  Skin: Warm and dry  Laboratory Data:   Lab Results   Component Value Date    K 3.9 01/10/2024     Recent Labs   Lab 01/08/24  1816 01/09/24  0652 01/10/24  0541   * 92  --    BUN 12 10  --    CREATSERUM 0.31* 0.37*  --    CA 8.4* 7.9*  --     141  --    K 3.1* 3.2* 3.9    107  --    CO2 32.0 32.0  --      Recent Labs   Lab 01/08/24 1816   RBC 3.51*   HGB 10.5*   HCT 33.1*   MCV 94.3   MCH 29.9   MCHC 31.7   RDW 14.0   NEPRELIM 10.30*   WBC 13.1*   .0       Recent Labs   Lab 01/08/24 1816   ALT 23   AST 21       Procedure       Date/Time   XR ESOPHAGUS SINGLE CONTRAST (CPT=74220) [[id number]] Collected: 01/09/24 1231   Order Status: Completed Updated: 01/09/24 1232   Narrative:     PROCEDURE:  XR ESOPHAGUS SINGLE CONTRAST (CPT=74220)     TECHNIQUE:  An esophagram was performed with fluoroscopy in the usual manner.     COMPARISON:  [facility] , XR, XR UGI/ESOPHAGUS SINGLE CONTRAST (CPT=74240), 12/18/2023, 11:22 AM.     INDICATIONS:  recurrent PNA; hx of esophageal cancer s/p esophagectomy     PATIENT STATED HISTORY: (As transcribed by Technologist)  Patient shares that he has a hard time drinking water and other liquids. He states that he feels like the liquid goes down the wrong way. Patient has recurrent pneumonia.      FLUOROSCOPY IMAGES OBTAINED:  9  FLUOROSCOPY TIME:  1 minute and 6 seconds  RADIATION DOSE (AIR KERMA PRODUCT):  1408.1uGy/m2     FINDINGS:    STRUCTURE:   Postsurgical changes are noted of  esophagectomy with gastric pull-through.  There is no evidence of anastomotic leak.  There is no evidence of fistulization to the bronchial tree.  MOTILITY:  There is mild dysmotility seen with the mid to distal esophagus/stomach pull-through.  REFLUX:  Only upright was performed and therefore no definite reflux was identified during the exam.  OTHER:  Patient did cough and spit up during the exam.  There is no evidence of aspiration when using fluoroscopy after the patient did cough.                     Impression:     CONCLUSION:  Postsurgical changes of gastric pull-through with mild dysmotility.  No evidence of significant stricture or fistula.        LOCATION:  [facility]        Dictated by (CST): Ovidio Patel MD on 1/09/2024 at 12:28 PM      Finalized by (CST): Ovidio Patel MD on 1/09/2024 at 12:31 PM         DYSPHAGIA ASSESSMENT  Test completed in conjunction with Radiologist.  Patient Positioned: Upright;Midline.  Patient Viewed: Lateral.   .  Consistencies Presented: Thin liquids;Hard solid to assess oropharyngeal swallow function and assess for compensatory strategies to improve safe swallow function.     THIN LIQUIDS  Method of Presentation: Cup;Straw  Oral Phase of Swallow (VFSS - Thin Liquids): Within Functional Limits  Triggered at: Pyriform sinuses (x1)  Residue Severity, Location: Trace;Throughout pharynx  Laryngeal Penetration: Trace;Transient  Tracheal Aspiration: None  Cough/Throat Clear Response: Yes (despite absence of aspiration)  HARD SOLID  Oral Phase of Swallow (VFSS - Hard Solid): Within Functional Limits  Triggered at: Valleculae  Laryngeal Penetration: None  Tracheal Aspiration: None  Penetration Aspiration Scale Score: Score 2: Material enters the airway, remains above the vocal folds, and is ejected from the airway     Overall Impression:   Results of exam similar to VFSS 1/2/24. Oropharyngeal swallow appears largely intact. Bolus acceptance was adequate without evidence of anterior bolus  loss. Mastication and AP bolus transit were thorough and efficient without evidence of oral residue. Pharyngeal swallow initiation was timely. Base of tongue retraction, epiglottic inversion, and hyolaryngeal excursion were WFL. Trace transient penetration observed with thin liquids which cleared with cessation of the swallow. No aspiration visualized. However, patient with strong cough response following consecutive sips of thin liquids via straw.      Patient appears safe to continue a regular diet and thin liquids. Patient may benefit from ENT consult for possible laryngeal hypersensitivity; may be d/t chronic reflux and recent viral respiratory infection. No further inpatient SLP services warranted at this time, but patient may benefit from outpatient SLP in coordination with laryngologist pending work up for laryngeal hypersensitivity. Education provided re: results and recommendations. Images reviewed with patient following exam.    Assessment: This is a patient with esophageal adenoCa and who is s/p esophagectomy complicated by bronchoesophageal fistula.  He had initially required both a bronchial and esophageal stenting.  However, the esophagus stent has since been removed, and no leak has been documented.  He has had recurrent admissions related to choking/coughing, and chest pain.  However, there is no evidence that this is related to reflux or his esophageal / gastric anatomy.  He likely has a component of reflux, which is to be expected, but I don't think that it is causing his recurrent choking/coughing symptoms. This  has been clarified by esophagram and video swallow evaluation.  Plan:   1) Increase Pantoprazole to 40 mg bid  2) Add Reglan 10 qid, per Dr. Ritchie  3) Would consult ENT for possible laryngospasm as suggested by Speech Pathology  4) Pulmonary already on board.  5) From a GI standpoint, we will sign-off.

## 2024-01-10 NOTE — PLAN OF CARE
Pt received A&Ox4. VSS. RA. Afebrile. Pt c/o cough, medications given per MAR. Call light within reach.

## 2024-01-10 NOTE — PROGRESS NOTES
[facility] Pulmonary/Critical Care Progress Note     SUBJECTIVE/Interval history:  No acute events overnight, no change in postprandial cough. No fevers.     Review of Systems:   A comprehensive 14 point review of systems was completed.   Pertinent positives and negatives noted in the HPI.    OBJECTIVE:  Vitals:    01/09/24 1917 01/09/24 2020 01/10/24 0411 01/10/24 0749   BP:  138/66 153/76    BP Location:  Left arm Left arm    Pulse: 62 56 62 60   Resp: 16 16 18 18   Temp:  98.2 °F (36.8 °C) 97.8 °F (36.6 °C)    TempSrc:  Oral Oral    SpO2:  94% 97% 96%   Weight:       Height:           Vital signs in last 24 hours:  Blood pressure 153/76, pulse 60, temperature 97.8 °F (36.6 °C), temperature source Oral, resp. rate 18, height 6' 2\" (1.88 m), weight 180 lb (81.6 kg), SpO2 96%.     Intake/Output:    Intake/Output Summary (Last 24 hours) at 1/10/2024 1006  Last data filed at 1/9/2024 1500  Gross per 24 hour   Intake 240 ml   Output --   Net 240 ml           Physical Exam:  General: Appears alert, comfortable. No acute distress  Lungs:CTAB no wheeze  Heart:RRR no m  Abdomen:Soft, non-tender.  No masses.  No guarding.  No rebound.  Extremities:no edema    Lab Data Review:   Recent Labs   Lab 01/08/24  1816 01/09/24  0652 01/10/24  0541   * 92  --    BUN 12 10  --    CREATSERUM 0.31* 0.37*  --    CA 8.4* 7.9*  --     141  --    K 3.1* 3.2* 3.9    107  --    CO2 32.0 32.0  --      Recent Labs   Lab 01/08/24 1816   RBC 3.51*   HGB 10.5*   HCT 33.1*   MCV 94.3   MCH 29.9   MCHC 31.7   RDW 14.0   NEPRELIM 10.30*   WBC 13.1*   .0     No results for input(s): \"BNP\" in the last 168 hours.  No results for input(s): \"TROP\", \"CK\" in the last 168 hours.  No results for input(s): \"PT\", \"INR\", \"PTT\" in the last 168 hours.  No results for input(s): \"ABGPHT\", \"MVHOKF3D\", \"HKMUJ0J\", \"ABGHCO3\", \"ABGBE\", \"TEMP\", \"TACOS\", \"SITE\", \"DEV\", \"THGB\" in the last 168 hours.    Invalid input(s):  \"TYU64BTR\", \"CHOB\"    Other Labs:  Interval Culture Data:   Hospital Encounter on 01/08/24   1. Blood Culture     Status: None (Preliminary result)    Collection Time: 01/08/24  7:37 PM    Specimen: Blood,peripheral   Result Value Ref Range    Blood Culture Result No Growth 1 Day N/A     No results for input(s): \"COLORUR\", \"CLARITY\", \"SPECGRAVITY\", \"GLUUR\", \"BILUR\", \"KETUR\", \"BLOODURINE\", \"PHURINE\", \"PROUR\", \"UROBILINOGEN\", \"NITRITE\", \"LEUUR\", \"WBCUR\", \"RBCUR\", \"BACUR\", \"EPIUR\" in the last 168 hours.    Interval Radiology:   XR ESOPHAGUS SINGLE CONTRAST (CPT=74220)    Result Date: 1/9/2024  CONCLUSION:  Postsurgical changes of gastric pull-through with mild dysmotility.  No evidence of significant stricture or fistula.   LOCATION:  [facility]   Dictated by (CST): Ovidio Patel MD on 1/09/2024 at 12:28 PM     Finalized by (CST): Ovidio Patel MD on 1/09/2024 at 12:31 PM       XR VIDEO SWALLOW (CPT=74230)    Result Date: 1/9/2024  PROCEDURE:  XR VIDEO SWALLOW (CPT=74230)  TECHNIQUE:  Video fluoroscopic swallowing study was performed in cooperation with the speech pathologist.  Barium of varying consistencies was administered orally with patient in lateral projection.  COMPARISON:  MONIQUE KING, XR VIDEO SWALLOW (CPT=74230), 1/02/2024, 8:58 AM.  INDICATIONS:  coughing/choking with PO liwuid intake  PATIENT STATED HISTORY: (As transcribed by Technologist)  The patient offered no additional history at this point.   CINE CAPTURES:  5 FLUORSCOPY TIME:  43 sec RADIATION DOSE (AIR KERMA PRODUCT):  22.6 uGy/m2   FINDINGS:  PHARYNGEAL PHASE:  Normal for age. ASPIRATION:  None. PENETRATION:  Normal. OTHER:  Negative.  PLEASE SEE SPEECH PATHOLOGIST REPORT FOR FULL ASSESSMENT OF SWALLOWING MECHANISM.   LOCATION:  [facility]    Dictated by (CST): Ovidio Patel MD on 1/09/2024 at 12:11 PM     Finalized by (CST): Ovidio Patel MD on 1/09/2024 at 12:11 PM       XR CHEST AP PORTABLE  (CPT=71045)    Result Date: 1/8/2024  CONCLUSION:  1. There  is some interval clearing of opacity from left upper lobe which is probably improved pneumonia. 2. Persistent right costophrenic angle blunting is consistent with effusion or atelectasis. 3. There is a stent in the right mainstem bronchus which is stable.   LOCATION:        Dictated by (CST): Kraig Tristan MD on 1/08/2024 at 7:31 PM     Finalized by (CST): Kraig Tristan MD on 1/08/2024 at 7:32 PM         pantoprazole  40 mg Oral BID AC    sodium chloride (hypertonic)  3 mL Nebulization TID    albuterol  2.5 mg Nebulization TID    metoclopramide  10 mg Oral TID AC and HS (2200)    Or    metoclopramide  10 mg Intravenous TID AC and HS (2200)    cetirizine  5 mg Oral Daily    fluticasone furoate-vilanterol  1 puff Inhalation Daily    losartan  50 mg Oral Daily    metoprolol succinate ER  50 mg Oral Daily Beta Blocker    lipase-protease-amylase (Lip-Prot-Amyl)  25,000 Units Oral TID CC    sertraline  100 mg Oral Daily    vancomycin  125 mg Oral Q6H    enoxaparin  40 mg Subcutaneous Daily    cephalexin  500 mg Oral TID    lidocaine-menthol  2 patch Transdermal Daily     diphenhydrAMINE, heparin, baclofen, benzonatate, melatonin, acetaminophen, ondansetron, polyethylene glycol (PEG 3350), sennosides, bisacodyl, fleet enema, morphINE **OR** morphINE **OR** morphINE    ASSESSMENT  Postprandial cough. 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  His most recent imaging testing is unrevealing including on review of 1/4 PET/CT and 12/30 CT scan  Chest pain/pressure - unclear if related to above vs musculoskeletal  Bilateral nodules/infiltrates suspect related to aspiration episodes and/or COVID moreso than malignancy  COVID infection/pneumonia - diagnosed 12/10/2023  bronchoesophageal fistula s/p esophageal stenting (now removed 12/12/2023) and endobronchial stenting at [facility]  Cough, dysphagia related to above  Metastatic esophageal adenocarcinoma s/p gastroesphagectomy  9/2022  Anemia, thrombocytopenia  GERD     PLAN  Continue close monitoring of respiratory status, wean o2 for sats >89%  continue on nebs for stent patency  off breo inhaler given lack of any improvement  Continue flutter valve to aid secretion clearance  GERD management per GI  Antibiotics as per ID -keflex  Noted plans for ENT eval  Will plan on bronchoscopy with airway evaluation to ensure no other issues/fistulae and appropriate stent positioning - plan on bronchoscopy tomorrow morning    Chapo Madrid MD

## 2024-01-11 ENCOUNTER — ANESTHESIA EVENT (OUTPATIENT)
Dept: ENDOSCOPY | Facility: HOSPITAL | Age: 54
End: 2024-01-11
Payer: COMMERCIAL

## 2024-01-11 ENCOUNTER — ANESTHESIA (OUTPATIENT)
Dept: ENDOSCOPY | Facility: HOSPITAL | Age: 54
End: 2024-01-11
Payer: COMMERCIAL

## 2024-01-11 PROCEDURE — 99232 SBSQ HOSP IP/OBS MODERATE 35: CPT | Performed by: INTERNAL MEDICINE

## 2024-01-11 PROCEDURE — 31622 DX BRONCHOSCOPE/WASH: CPT | Performed by: INTERNAL MEDICINE

## 2024-01-11 PROCEDURE — 0BJ08ZZ INSPECTION OF TRACHEOBRONCHIAL TREE, VIA NATURAL OR ARTIFICIAL OPENING ENDOSCOPIC: ICD-10-PCS | Performed by: INTERNAL MEDICINE

## 2024-01-11 RX ORDER — HYDROCODONE BITARTRATE AND ACETAMINOPHEN 5; 325 MG/1; MG/1
1 TABLET ORAL EVERY 6 HOURS PRN
Status: DISCONTINUED | OUTPATIENT
Start: 2024-01-11 | End: 2024-01-13

## 2024-01-11 RX ORDER — NALOXONE HYDROCHLORIDE 0.4 MG/ML
0.08 INJECTION, SOLUTION INTRAMUSCULAR; INTRAVENOUS; SUBCUTANEOUS ONCE AS NEEDED
Status: ACTIVE | OUTPATIENT
Start: 2024-01-11 | End: 2024-01-11

## 2024-01-11 RX ORDER — SODIUM CHLORIDE, SODIUM LACTATE, POTASSIUM CHLORIDE, CALCIUM CHLORIDE 600; 310; 30; 20 MG/100ML; MG/100ML; MG/100ML; MG/100ML
INJECTION, SOLUTION INTRAVENOUS CONTINUOUS
Status: DISCONTINUED | OUTPATIENT
Start: 2024-01-11 | End: 2024-01-13

## 2024-01-11 RX ADMIN — SODIUM CHLORIDE: 9 INJECTION, SOLUTION INTRAVENOUS at 10:24:00

## 2024-01-11 RX ADMIN — SODIUM CHLORIDE: 9 INJECTION, SOLUTION INTRAVENOUS at 10:02:00

## 2024-01-11 NOTE — CONSULTS
[facility]    Report of Consultation    Dontae Cortez Jr. Patient Status:  Inpatient    10/15/1970 MRN [id number]   Location [facility] 4NW-A Attending Charles Maguire MD   Hosp Day # 2 PCP Adrian Horowitz MD     Date of Admission:  2024  Date of Consult:  1/10/24     Reason for Consultation:  Cough    History of Present Illness:  Dontae Cortez Jr. is a a(n) 53 year old male. History of esophageal carcinoma treated with esophagectomy.  Developed bronchoesophageal fistula s/p esophageal and endobronchial stenting.  Recent dysphagia with liquids.  Frequent cough after swallow and chest pain.  Excessive mucous.  No voice changes.   Video swallow an  did not show aspiration but he did have occasional cough response.      History:  Past Medical History:   Diagnosis Date    Back problem     Belching 2022    Black stools 2022    Borderline diabetes     Dx in 2013 - HgA1C 6.2%    C. difficile diarrhea 10/23/2022    pt treated and without symptoms    Chest pain 2022    Coronary artery disease     On 13: CABG x 4 with LIMA to LAD and SVG to diagonal, OM and PDA    Decorative tattoo 2000    Depression     Esophageal cancer (HCC)     completed chemo    Esophageal reflux     Essential hypertension     Exposure to medical diagnostic radiation     last tx 2022    Frequent urination 2013    Gastroparesis     Heartburn 2022    High blood pressure     High cholesterol 2013    Found when I had quadruple bypass    History of COVID-19 10/16/2022    asymptomatic - pt was dx during a hospitalization for another diagnosis. No continued symptoms    History of stomach ulcers     Hyperlipidemia     Hyperlipidemia LDL goal < 70     Indigestion 2022    Morbid obesity with BMI of 40.0-44.9, adult (HCC)     Muscle weakness     Nontoxic multinodular goiter     Dx in 2013: pt was told that imaging showed thyroid cysts per PCP    Pancreatic cancer (HCC)      last dose 12/4/2023 is scheduled for another round 12/27/23    Peripheral vascular disease (HCC)     pt denies    Personal history of antineoplastic chemotherapy     for esophageal cancer/completed    Personal history of antineoplastic chemotherapy     pancreatic cancer    Problems with swallowing 02/01/2022    Pulmonary nodules     Dx in 8/2013: CT chest showed small bilateral fissural-based lung nodules less than 1 cm    S/P CABG x 4     On 8/16/13: CABG x 4 with LIMA to LAD and SVG to diagonal, OM and PDA    Shortness of breath     Vomiting 02/01/2022     Past Surgical History:   Procedure Laterality Date    APPENDECTOMY      APPENDECTOMY      ARTHROSCOPY OF JOINT UNLISTED      right shoulder    CABG      On 8/16/13: CABG x 4 with LIMA to LAD and SVG to diagonal, OM and PDA    CARDIAC CATH LAB      On 8/14/2013: cardiac cath showed 3-vessel disease    OTHER SURGICAL HISTORY      1.       Laparoscopic robotic-assisted esophagogastrectomy.     Family History   Problem Relation Age of Onset    Cancer Mother         breast and colon     Diabetes Neg       reports that he quit smoking about 10 years ago. His smoking use included cigarettes. He has never used smokeless tobacco. He reports that he does not currently use alcohol. He reports that he does not use drugs.    Allergies:  No Known Allergies    Medications:    Current Facility-Administered Medications:     pantoprazole (Protonix) DR tab 40 mg, 40 mg, Oral, BID AC    alum-mag hydroxide-simethicone (Maalox) 200-200-20 MG/5ML oral suspension 30 mL, 30 mL, Oral, QID PRN    enoxaparin (Lovenox) 40 MG/0.4ML SUBQ injection 40 mg, 40 mg, Subcutaneous, Nightly    sodium chloride (hypertonic) 3 % nebulizer solution 3 mL, 3 mL, Nebulization, TID    albuterol (Ventolin) (2.5 MG/3ML) 0.083% nebulizer solution 2.5 mg, 2.5 mg, Nebulization, TID    diphenhydrAMINE (Benadryl) 12.5 MG/5ML oral liquid 12.5 mg, 12.5 mg, Oral, QID PRN    metoclopramide (Reglan) tab 10 mg, 10 mg,  Oral, TID AC and HS (2200) **OR** metoclopramide (Reglan) 5 mg/mL injection 10 mg, 10 mg, Intravenous, TID AC and HS (2200)    heparin (Porcine) 100 Units/mL lock flush 500 Units, 5 mL, Intravenous, PRN    baclofen (Lioresal) tab 10 mg, 10 mg, Oral, TID PRN    benzonatate (Tessalon) cap 100 mg, 100 mg, Oral, TID PRN    cetirizine (ZyrTEC) tab 5 mg, 5 mg, Oral, Daily    fluticasone furoate-vilanterol (Breo Ellipta) 100-25 MCG/ACT inhaler 1 puff, 1 puff, Inhalation, Daily    losartan (Cozaar) tab 50 mg, 50 mg, Oral, Daily    metoprolol succinate ER (Toprol XL) 24 hr tab 50 mg, 50 mg, Oral, Daily Beta Blocker    pancrelipase (Lip-Prot-Amyl) (Zenpep) DR particles cap 25,000 Units, 25,000 Units, Oral, TID CC    sertraline (Zoloft) tab 100 mg, 100 mg, Oral, Daily    vancomycin (Vancocin) cap 125 mg, 125 mg, Oral, Q6H    melatonin tab 3 mg, 3 mg, Oral, Nightly PRN    sodium chloride 0.9% infusion, , Intravenous, Continuous    acetaminophen (Tylenol Extra Strength) tab 500 mg, 500 mg, Oral, Q4H PRN    ondansetron (Zofran) 4 MG/2ML injection 4 mg, 4 mg, Intravenous, Q6H PRN    polyethylene glycol (PEG 3350) (Miralax) 17 g oral packet 17 g, 17 g, Oral, Daily PRN    sennosides (Senokot) tab 17.2 mg, 17.2 mg, Oral, Nightly PRN    bisacodyl (Dulcolax) 10 MG rectal suppository 10 mg, 10 mg, Rectal, Daily PRN    fleet enema (Fleet) 7-19 GM/118ML rectal enema 133 mL, 1 enema, Rectal, Once PRN    morphINE PF 2 MG/ML injection 1 mg, 1 mg, Intravenous, Q2H PRN **OR** morphINE PF 2 MG/ML injection 2 mg, 2 mg, Intravenous, Q2H PRN **OR** morphINE PF 4 MG/ML injection 4 mg, 4 mg, Intravenous, Q2H PRN    cephalexin (Keflex) cap 500 mg, 500 mg, Oral, TID    lidocaine-menthol 4-1 % patch 2 patch, 2 patch, Transdermal, Daily    Review of Systems:  Pertinent items are noted in HPI.    Physical Exam:  Blood pressure 130/59, pulse 54, temperature 98.4 °F (36.9 °C), temperature source Oral, resp. rate 18, height 6' 2\" (1.88 m), weight 180 lb  (81.6 kg), SpO2 95%.    General: Alert, orientated x3.  Cooperative.  No apparent distress.  Nose - Clear  OC - normal s/p T  Neck - No LAD      Flexible Laryngoscopy Procedure Note (21307)    Due to inability for adequate examination of the larynx and need for magnification to perform the examination, endoscopy was performed.  Risks and benefits were discussed with patient/family and they have given verbal consent to proceed.    Pre-operative Diagnosis:   1. Dysphagia, unspecified type    2. Shortness of breath    3. Hypokalemia        Post-operative Diagnosis: Same    Procedure: Diagnostic flexible fiberoptic laryngoscopy    Anesthesia: Topical Tetracaine with Ephedrine    Surgeon: No name on file    EBL: 0cc    Procedure Details:  A flexible scope was passed into the right nasal cavity.  It was advanced through the nasopharyx and oropharynx. The hypopharynx, supraglottis and glottis were then examined. The mobility of the true vocal cords was assessed. The scope was then withdrawn.    Findings: All the structures listed above in the procedure description appeared normal, except as follows:   Limited exam due to strong gag reflex.  TVC appear mobile.      Condition: Stable    Complications: Patient tolerated the procedure well with no immediate complications.       Impression and Plan:  Patient Active Problem List   Diagnosis    Primary hypertension    Hyperlipidemia with target low density lipoprotein (LDL) cholesterol less than 70 mg/dL    Coronary artery disease involving native coronary artery of native heart without angina pectoris    S/P CABG x 4    Nontoxic multinodular goiter    Pulmonary nodules    Thrombophlebitis leg    BRANDAN (generalized anxiety disorder)    Right shoulder Arthroscopy acromioplasty ,distal  clavicle resection, rotator cuff/labral debridment  Global 05/13/2021    Right bicipital tenosynovitis    Malignant neoplasm of esophagus (HCC)    Esophageal anastomotic leak    Esophageal obstruction     On total parenteral nutrition (TPN)    Mechanical complication of esophagostomy (HCC)    Abdominal pain of unknown etiology    Migration of esophageal stent    Gastroparesis    Aspiration pneumonia of both lungs due to gastric secretions (HCC)    Esophageal carcinoma (HCC)    Normocytic anemia    Esophageal fistula    Postnasal drip    Subacute cough    Fistula, bronchoesophageal (HCC)    Pneumonia of both lower lobes due to infectious organism    Malignant neoplasm of pancreas (HCC)    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 difficile colitis    Dysphagia, unspecified type    Shortness of breath    Hypokalemia    Dysphagia       Strong gag response with flexible laryngoscopy - consistent with laryngeal hypersensitivity.  Limited view of TVC.  Scheduled for bronch tomorrow.  Recommend F/U with [facility] (Laryngology) as outpatient for repeat scope.      Rashaad Colmenares MD  1/10/2024  10:30 PM

## 2024-01-11 NOTE — PLAN OF CARE
Patient is alert, c/o 9/10 pain - PRN per MAR. NPO for procedure - taken down and back on cart. Tolerating PO intake, declined IVF. Call light within reach.

## 2024-01-11 NOTE — PLAN OF CARE
Patient through out the day continues to ask for morphine, did state that the heartburn medicine got better. Patient also is requesting benadryl however was given in the ER. Physician was notified of request, no further orders at this time. Patient remains calm in bed. No noticing of cough through the day by RN. Patient has been spitting acidic sputum into a cup through out the shift states he is not able to swallow it. Call light in reach, belongings at bedside.       Problem: PAIN - ADULT  Goal: Verbalizes/displays adequate comfort level or patient's stated pain goal  Description: INTERVENTIONS:  - Encourage pt to monitor pain and request assistance  - Assess pain using appropriate pain scale  - Administer analgesics based on type and severity of pain and evaluate response  - Implement non-pharmacological measures as appropriate and evaluate response  - Consider cultural and social influences on pain and pain management  - Manage/alleviate anxiety  - Utilize distraction and/or relaxation techniques  - Monitor for opioid side effects  - Notify MD/LIP if interventions unsuccessful or patient reports new pain  - Anticipate increased pain with activity and pre-medicate as appropriate  Outcome: Not Progressing

## 2024-01-11 NOTE — OPERATIVE REPORT
[facility]    Dontae Cortez Jr. Patient Status:  Hospital Outpatient Surgery    10/15/1970   MRN [id number]     Location [facility] ENDOSCOPY Attending Chapo Madrid MD   Hosp Day # 3 PCP Adrian Horowitz MD     OPERATIVE REPORT:     DATE OF OPERATION: 24     PREOPERATIVE DIAGNOSIS(ES): chronic cough, bronchoesophageal fistula     POSTOPERATIVE DIAGNOSIS(ES): bronchoesophageal fistula in medial aspect of the distal right main stem      OPERATION(S) PERFORMED: Bronchoscopy with airway inspection     SURGEON: Chapo Madrid MD    ANESTHESIA: MAC sedation; please see separate flow sheet.      EQUIPMENT:   Olympus adult videobronchoscope.     INDICATION: The patient is a 53 year old male with history of esophageal cancer s/p chemoRT and gastroesophagectomy 2022 with history of bronchoesophageal fistula with previous esophageal stent and most recently with endobronchial stent in the right main stem who was admitted with recurrent postprandial cough with liquids and solids.  He has had negative swallow studies and now undergoing bronchoscopy to evaluate the airway, bronchoesophageal fistula and stent positioning.     CONSENT:  Risks and benefits were reviewed with the patient in detail regarding the procedure as well as anesthesia prior to the procedure. All questions were answered, and the patient was agreeable.     PROCEDURE:  After informed consent was obtained, the patient was brought to the bronchoscopy suite.  Time out performed.  Patient was placed in a supine position, anesthesia administered, and topical lidocaine given for local anesthesia.     First, the videobronchoscope was used and inserted orally through the bite block. The upper airways appeared normal. The vocal cords were insufficiently evaluated due to the level of sedation but appeared normal and approximated well. The bronchoscope was then advanced into the trachea. Evaluation of the trachea and left sided airways  demonstrated normal endobronchial examination to the subsegmental level.   Upon evaluation of the right lung, a covered endobronchial stent was positioned in the right main stem with the proximal end just at the takeoff of the right main stem bronchus. The distal end of the stent terminated in the proximal bronchus intermedius.  The right upper lobe takeoff could be seen lateral to the stent proximally. The stent is not seated fully against the medial aspect of the right main stem.  During the course of bronchoscopy, especially with coughing, bile was seen to emanate from the medial aspect of the right main stem beneath the stent. I was not able to visualize the bronchoesophageal fistula due to the stent placement.   Aside from the above findings, there were no other significant abnormalities noted, specifically no other fistulae.   There was no evidence of bleeding and the bronchoscope was then withdrawn. The patient tolerated the procedure well without immediate complications.     EBL:  none     IMPRESSION:   Right main stem bronchoesophageal fistula with endobronchial stent positioned in the right main stem.      PLAN:  Will await review with Dr. Ritchie if an esophageal stent would be possible/feasible. If not, then I would recommend he be evaluated with Dr. Moura at [facility] to consider bronchoscopy and stent revision      Chapo Madrid MD

## 2024-01-11 NOTE — PROGRESS NOTES
Gastroenterology Progress Note  Dontae Cortez Jr. Patient Status:  Inpatient    10/15/1970 MRN [id number]   [facility] 4NW-A Attending Charles Maguire MD   Hosp Day # 3 PCP Adrian Horowitz MD     Chief Complaint: Chronic cough, hx of bronchoesophageal fistula   S: Called back to assess pt as pt underwent bronchoscopy today which revealed bronchoesophageal fistula in medial aspect of the distal right main stem.   Pt with ongoing cough. No abd pain, no nausea.   O: /76   Pulse 50   Temp 97.5 °F (36.4 °C) (Oral)   Resp 14   Ht 6' 2\" (1.88 m)   Wt 180 lb (81.6 kg)   SpO2 94%   BMI 23.11 kg/m²   Gen: AAOx3  CV: Bradycardic, regular   Resp: Diminished bilaterally; No increase in respiratory effort   Abd: (+)BS, soft, non-tender, non-distended; no rebound or guarding  Ext: No edema or cyanosis  Skin: Warm and dry  Laboratory Data:     No results for input(s): \"PGLU\" in the last 168 hours.  No results for input(s): \"INR\" in the last 168 hours.      Recent Labs   Lab 24   WBC 13.1*   HGB 10.5*   .0       Recent Labs   Lab 24  1816 24  0652 01/10/24  0541    141  --    K 3.1* 3.2* 3.9    107  --    CO2 32.0 32.0  --    BUN 12 10  --    CREATSERUM 0.31* 0.37*  --        Recent Labs   Lab 24   ALT 23   AST 21     Assessment: 53 yr-old male with hx of CAD s/p CABG, HTN, dyslipidemia, and distal esophageal adenocarcinoma (dx 2022) s/p chemoRT + s/p esophagectomy (2022; Dr Lu) with recovery c/b anastomotic leak s/p endoscopic closure with stenting and course further c/b GOO s/p EGD with esophageal stenting and Botox (2022). More recently pt noted to have pancreatic head mass (2023) with bx revealing poorly differentiated carcinoma. Over the last several months pt has been struggling with bronchoesophageal fistula which has been treated with esophageal stenting (since removed) and  also bronchial stenting (in-place; [facility]). Pt with recurrent cough and today noted to have recurrent fistula in medial aspect of the distal right main stem despite normal esophogram. Discussed with interventional GI (Dr Ritchie) and plan for EGD with possible clipping of fistula   The risks, benefits, alternatives of the procedure including the risks of anesthesia, bleeding, perforation, missed lesions, need for surgery, and infection were discussed with the patient. He expressed understanding of the risks and was agreeable to proceed.  Plan:   EGD under MAC with possible clipping with Dr Ritchie on 1/12/24  NPO at midnight  Continue PPI BID + Reglan TID AC and HS  CBC, BMP, Mg correcting electrolytes per Hospitalist recommendations   Discussed with Dr Ritchie and Dr Julius Perea, APRN  2:16 PM  1/11/2024  [facility] Gastroenterology  [phone]

## 2024-01-11 NOTE — ANESTHESIA PREPROCEDURE EVALUATION
PRE-OP EVALUATION    Patient Name: Dontae Cortez Jr.    Admit Diagnosis: Shortness of breath [R06.02]  Hypokalemia [E87.6]  Dysphagia, unspecified type [R13.10]    Pre-op Diagnosis: inpatient    BRONCHOSCOPY FOR AIRWAY EVALUATION    Anesthesia Procedure: BRONCHOSCOPY FOR AIRWAY EVALUATION    Surgeon(s) and Role:     * Chapo Madrid MD - Primary    Pre-op vitals reviewed.  Temp: 98 °F (36.7 °C)  Pulse: 60  Resp: 16  BP: 140/80  SpO2: 93 %  Body mass index is 23.11 kg/m².    Current medications reviewed.  Hospital Medications:   pantoprazole (Protonix) DR tab 40 mg  40 mg Oral BID AC    alum-mag hydroxide-simethicone (Maalox) 200-200-20 MG/5ML oral suspension 30 mL  30 mL Oral QID PRN    enoxaparin (Lovenox) 40 MG/0.4ML SUBQ injection 40 mg  40 mg Subcutaneous Nightly    [COMPLETED] potassium chloride (K-Dur) tab 40 mEq  40 mEq Oral Once    sodium chloride (hypertonic) 3 % nebulizer solution 3 mL  3 mL Nebulization TID    albuterol (Ventolin) (2.5 MG/3ML) 0.083% nebulizer solution 2.5 mg  2.5 mg Nebulization TID    diphenhydrAMINE (Benadryl) 12.5 MG/5ML oral liquid 12.5 mg  12.5 mg Oral QID PRN    metoclopramide (Reglan) tab 10 mg  10 mg Oral TID AC and HS (2200)    Or    metoclopramide (Reglan) 5 mg/mL injection 10 mg  10 mg Intravenous TID AC and HS (2200)    heparin (Porcine) 100 Units/mL lock flush 500 Units  5 mL Intravenous PRN    [COMPLETED] morphINE PF 4 MG/ML injection 4 mg  4 mg Intravenous Once    baclofen (Lioresal) tab 10 mg  10 mg Oral TID PRN    benzonatate (Tessalon) cap 100 mg  100 mg Oral TID PRN    cetirizine (ZyrTEC) tab 5 mg  5 mg Oral Daily    fluticasone furoate-vilanterol (Breo Ellipta) 100-25 MCG/ACT inhaler 1 puff  1 puff Inhalation Daily    losartan (Cozaar) tab 50 mg  50 mg Oral Daily    metoprolol succinate ER (Toprol XL) 24 hr tab 50 mg  50 mg Oral Daily Beta Blocker    pancrelipase (Lip-Prot-Amyl) (Zenpep) DR particles cap 25,000 Units  25,000 Units Oral TID CC    sertraline  (Zoloft) tab 100 mg  100 mg Oral Daily    vancomycin (Vancocin) cap 125 mg  125 mg Oral Q6H    melatonin tab 3 mg  3 mg Oral Nightly PRN    sodium chloride 0.9% infusion   Intravenous Continuous    acetaminophen (Tylenol Extra Strength) tab 500 mg  500 mg Oral Q4H PRN    ondansetron (Zofran) 4 MG/2ML injection 4 mg  4 mg Intravenous Q6H PRN    polyethylene glycol (PEG 3350) (Miralax) 17 g oral packet 17 g  17 g Oral Daily PRN    sennosides (Senokot) tab 17.2 mg  17.2 mg Oral Nightly PRN    bisacodyl (Dulcolax) 10 MG rectal suppository 10 mg  10 mg Rectal Daily PRN    fleet enema (Fleet) 7-19 GM/118ML rectal enema 133 mL  1 enema Rectal Once PRN    morphINE PF 2 MG/ML injection 1 mg  1 mg Intravenous Q2H PRN    Or    morphINE PF 2 MG/ML injection 2 mg  2 mg Intravenous Q2H PRN    Or    morphINE PF 4 MG/ML injection 4 mg  4 mg Intravenous Q2H PRN    cephalexin (Keflex) cap 500 mg  500 mg Oral TID    [] sodium chloride 0.9% infusion   Intravenous Continuous    [] morphINE PF 4 MG/ML injection 4 mg  4 mg Intravenous Q30 Min PRN    [] ondansetron (Zofran) 4 MG/2ML injection 4 mg  4 mg Intravenous Q4H PRN    [COMPLETED] potassium chloride 40 mEq in 250mL sodium chloride 0.9% IVPB premix  40 mEq Intravenous Once    [COMPLETED] morphINE PF 4 MG/ML injection 4 mg  4 mg Intravenous Once    lidocaine-menthol 4-1 % patch 2 patch  2 patch Transdermal Daily    [COMPLETED] HYDROcodone-acetaminophen (Norco) 5-325 MG per tab 1 tablet  1 tablet Oral Once       Outpatient Medications:     Medications Prior to Admission   Medication Sig Dispense Refill Last Dose    vancomycin 125 MG Oral Cap Take 1 capsule (125 mg total) by mouth every 6 (six) hours for 10 days, THEN 1 capsule (125 mg total) 2 (two) times a day for 15 days. 70 capsule 0 2024    cephalexin 500 MG Oral Cap Take 1 capsule (500 mg total) by mouth 3 (three) times daily for 10 days. 30 capsule 0 2024    fluticasone furoate-vilanterol 100-25  MCG/ACT Inhalation Aerosol Powder, Breath Activated Inhale 1 puff into the lungs daily. 1 each 2 1/8/2024    baclofen 10 MG Oral Tab Take 1 tablet (10 mg total) by mouth 3 (three) times daily as needed.   1/8/2024    cetirizine 5 MG Oral Tab Take 1 tablet (5 mg total) by mouth daily.   1/8/2024    sucralfate (CARAFATE) 1 g Oral Tab Take 1 tablet (1 g total) by mouth 3 (three) times daily as needed. 60 tablet 3 1/8/2024    benzonatate 100 MG Oral Cap Take 1 capsule (100 mg total) by mouth 3 (three) times daily as needed for cough. 30 capsule 0 1/8/2024    Omeprazole 40 MG Oral Capsule Delayed Release Take 1 capsule (40 mg total) by mouth 2 (two) times daily before meals. 90 capsule 3 1/8/2024    sertraline 100 MG Oral Tab Take 1 tablet (100 mg total) by mouth daily. 90 tablet 1 1/8/2024    Pancrelipase, Lip-Prot-Amyl, (CREON) 26369-20410 units Oral Cap DR Particles Take 2 capsules with meals and 1 capsule with snacks 240 capsule 0 1/8/2024    metoprolol succinate ER 50 MG Oral Tablet 24 Hr TAKE 1 TABLET BY MOUTH EVERY DAY 90 tablet 1 1/8/2024    ipratropium-albuterol 0.5-2.5 (3) MG/3ML Inhalation Solution Take 3 mL by nebulization every 6 (six) hours as needed. 120 each 1     HYDROcodone-acetaminophen 5-325 MG Oral Tab Take 1-2 tablets by mouth every 8 (eight) hours as needed for Pain. 10 tablet 0     guaiFENesin-codeine 100-10 MG/5ML Oral Solution Take 5 mL by mouth every 6 (six) hours as needed for cough. 237 mL 1     losartan 50 MG Oral Tab Take 1 tablet (50 mg total) by mouth daily. 90 tablet 2 Unknown    ipratropium-albuterol 0.5-2.5 (3) MG/3ML Inhalation Solution Take 3 mL by nebulization every 8 (eight) hours. 168 mL 1     sodium chloride, hypertonic, 3 % Inhalation Nebu Soln Take 3 mL by nebulization every 8 (eight) hours. 360 mL 1        Allergies: Patient has no known allergies.      Anesthesia Evaluation    Patient summary reviewed.    Anesthetic Complications  (-) history of anesthetic  complications         GI/Hepatic/Renal      (+) GERD                           Cardiovascular                  (+) hypertension     (+) CAD                                Endo/Other    Negative endo/other ROS.                              Pulmonary               (+) shortness of breath            Neuro/Psych      (+) depression  (+) anxiety                      Bronchoesophageal fistula        Past Surgical History:   Procedure Laterality Date    APPENDECTOMY      APPENDECTOMY      ARTHROSCOPY OF JOINT UNLISTED      right shoulder    CABG      On 8/16/13: CABG x 4 with LIMA to LAD and SVG to diagonal, OM and PDA    CARDIAC CATH LAB      On 8/14/2013: cardiac cath showed 3-vessel disease    OTHER SURGICAL HISTORY      1.       Laparoscopic robotic-assisted esophagogastrectomy.     Social History     Socioeconomic History    Marital status:     Number of children: 3   Occupational History    Occupation: works as  - on workman's comp   Tobacco Use    Smoking status: Former     Packs/day: 0.00     Years: 27.00     Additional pack years: 0.00     Total pack years: 0.00     Types: Cigarettes     Quit date: 8/15/2013     Years since quitting: 10.4    Smokeless tobacco: Never    Tobacco comments:     Quit smoking 2013   Vaping Use    Vaping Use: Never used   Substance and Sexual Activity    Alcohol use: Not Currently    Drug use: Never    Sexual activity: Not Currently     Partners: Female   Other Topics Concern    Caffeine Concern Yes     Comment: 2 energy drinks, 6 mountain dews daily    Exercise No     History   Drug Use Unknown     Available pre-op labs reviewed.  Lab Results   Component Value Date    WBC 13.1 (H) 01/08/2024    RBC 3.51 (L) 01/08/2024    HGB 10.5 (L) 01/08/2024    HCT 33.1 (L) 01/08/2024    MCV 94.3 01/08/2024    MCH 29.9 01/08/2024    MCHC 31.7 01/08/2024    RDW 14.0 01/08/2024    .0 01/08/2024     Lab Results   Component Value Date     01/09/2024    K 3.9 01/10/2024      01/09/2024    CO2 32.0 01/09/2024    BUN 10 01/09/2024    CREATSERUM 0.37 (L) 01/09/2024    GLU 92 01/09/2024    CA 7.9 (L) 01/09/2024            Airway      Mallampati: II  Mouth opening: >3 FB  TM distance: 4 - 6 cm  Neck ROM: full Cardiovascular      Rhythm: regular  Rate: normal     Dental             Pulmonary      Breath sounds clear to auscultation bilaterally.               Other findings              ASA: 3   Plan: MAC  NPO status verified and patient meets guidelines.          Plan/risks discussed with: patient (Discussed risk of possible GETA, nausea, vomiting, stroke, heart attack and dental damage)                Present on Admission:   Chest pain   Coronary artery disease involving native coronary artery of native heart without angina pectoris   Subacute cough   Fistula, bronchoesophageal (HCC)

## 2024-01-11 NOTE — ANESTHESIA POSTPROCEDURE EVALUATION
[facility]    Dontae Cortez  Patient Status:  Inpatient   Age/Gender 53 year old male MRN [id number]   Location [facility] 4NW-A Attending Charles Maguire MD   Hosp Day # 3 PCP Adrian Horowitz MD       Anesthesia Post-op Note    BRONCHOSCOPY FOR AIRWAY EVALUATION    Procedure Summary       Date: 01/11/24 Room / Location:  ENDOSCOPY 02 / [facility]    Anesthesia Start: 1002 Anesthesia Stop: 1023    Procedure: BRONCHOSCOPY FOR AIRWAY EVALUATION Diagnosis: (Bonchoesophageal fistular)    Surgeons: Chapo Madrid MD Anesthesiologist: Junaid Evangelista DO    Anesthesia Type: MAC ASA Status: 3            Anesthesia Type: MAC    Vitals Value Taken Time   /79 01/11/24 1040   Temp  01/11/24 1202   Pulse 56 01/11/24 1041   Resp 13 01/11/24 1040   SpO2 100 % 01/11/24 1041   Vitals shown include unfiled device data.    Patient Location: Endoscopy    Anesthesia Type: MAC    Airway Patency: patent    Postop Pain Control: adequate    Mental Status: preanesthetic baseline    Nausea/Vomiting: none    Cardiopulmonary/Hydration status: stable euvolemic    Complications: no apparent anesthesia related complications    Postop vital signs: stable    Dental Exam: Unchanged from Preop    Patient to be discharged from PACU when criteria met.

## 2024-01-11 NOTE — PROGRESS NOTES
[facility] Pulmonary/Critical Care Progress Note     SUBJECTIVE/Interval history:  No acute events overnight, no change in postprandial cough. No fevers.   Remains on RA    Review of Systems:   A comprehensive 14 point review of systems was completed.   Pertinent positives and negatives noted in the HPI.    OBJECTIVE:  Vitals:    01/10/24 1154 01/10/24 2018 01/11/24 0345 01/11/24 0914   BP: 129/63 130/59 144/71 140/80   BP Location: Left arm Left arm Left arm Right arm   Pulse: 55 54 (!) 49 60   Resp: 16 18 14 16   Temp: 97.8 °F (36.6 °C) 98.4 °F (36.9 °C) 98.2 °F (36.8 °C) 98 °F (36.7 °C)   TempSrc: Oral Oral Oral    SpO2: 96% 95% 95% 93%   Weight:       Height:           Vital signs in last 24 hours:  Blood pressure 140/80, pulse 60, temperature 98 °F (36.7 °C), resp. rate 16, height 6' 2\" (1.88 m), weight 180 lb (81.6 kg), SpO2 93%.     Intake/Output:    Intake/Output Summary (Last 24 hours) at 1/11/2024 0945  Last data filed at 1/11/2024 0821  Gross per 24 hour   Intake 0 ml   Output --   Net 0 ml           Physical Exam:  General: Appears alert, comfortable. No acute distress  Lungs:CTAB no wheeze  Heart:RRR no m  Abdomen:Soft, non-tender.  No masses.  No guarding.  No rebound.  Extremities:no edema    Lab Data Review:   Recent Labs   Lab 01/08/24  1816 01/09/24  0652 01/10/24  0541   * 92  --    BUN 12 10  --    CREATSERUM 0.31* 0.37*  --    CA 8.4* 7.9*  --     141  --    K 3.1* 3.2* 3.9    107  --    CO2 32.0 32.0  --      Recent Labs   Lab 01/08/24 1816   RBC 3.51*   HGB 10.5*   HCT 33.1*   MCV 94.3   MCH 29.9   MCHC 31.7   RDW 14.0   NEPRELIM 10.30*   WBC 13.1*   .0     No results for input(s): \"BNP\" in the last 168 hours.  No results for input(s): \"TROP\", \"CK\" in the last 168 hours.  No results for input(s): \"PT\", \"INR\", \"PTT\" in the last 168 hours.  No results for input(s): \"ABGPHT\", \"HJWWVU0K\", \"CLFET0S\", \"ABGHCO3\", \"ABGBE\", \"TEMP\", \"TACOS\", \"SITE\", \"DEV\",  \"THGB\" in the last 168 hours.    Invalid input(s): \"TMP98MRR\", \"CHOB\"    Other Labs:  Interval Culture Data:   Hospital Encounter on 01/08/24   1. Blood Culture     Status: None (Preliminary result)    Collection Time: 01/08/24  7:37 PM    Specimen: Blood,peripheral   Result Value Ref Range    Blood Culture Result No Growth 2 Days N/A     No results for input(s): \"COLORUR\", \"CLARITY\", \"SPECGRAVITY\", \"GLUUR\", \"BILUR\", \"KETUR\", \"BLOODURINE\", \"PHURINE\", \"PROUR\", \"UROBILINOGEN\", \"NITRITE\", \"LEUUR\", \"WBCUR\", \"RBCUR\", \"BACUR\", \"EPIUR\" in the last 168 hours.    Interval Radiology:   XR ESOPHAGUS SINGLE CONTRAST (CPT=74220)    Result Date: 1/9/2024  CONCLUSION:  Postsurgical changes of gastric pull-through with mild dysmotility.  No evidence of significant stricture or fistula.   LOCATION:  [facility]   Dictated by (CST): Ovidio Patel MD on 1/09/2024 at 12:28 PM     Finalized by (CST): Ovidio Patel MD on 1/09/2024 at 12:31 PM       XR VIDEO SWALLOW (CPT=74230)    Result Date: 1/9/2024  PROCEDURE:  XR VIDEO SWALLOW (CPT=74230)  TECHNIQUE:  Video fluoroscopic swallowing study was performed in cooperation with the speech pathologist.  Barium of varying consistencies was administered orally with patient in lateral projection.  COMPARISON:  [facility] , XR, XR VIDEO SWALLOW (CPT=74230), 1/02/2024, 8:58 AM.  INDICATIONS:  coughing/choking with PO liwuid intake  PATIENT STATED HISTORY: (As transcribed by Technologist)  The patient offered no additional history at this point.   CINE CAPTURES:  5 FLUORSCOPY TIME:  43 sec RADIATION DOSE (AIR KERMA PRODUCT):  22.6 uGy/m2   FINDINGS:  PHARYNGEAL PHASE:  Normal for age. ASPIRATION:  None. PENETRATION:  Normal. OTHER:  Negative.  PLEASE SEE SPEECH PATHOLOGIST REPORT FOR FULL ASSESSMENT OF SWALLOWING MECHANISM.   LOCATION:  [facility]    Dictated by (CST): Ovidio Patel MD on 1/09/2024 at 12:11 PM     Finalized by (CST): Ovidio Patel MD on 1/09/2024 at 12:11 PM       XR CHEST AP PORTABLE   (CPT=71045)    Result Date: 1/8/2024  CONCLUSION:  1. There is some interval clearing of opacity from left upper lobe which is probably improved pneumonia. 2. Persistent right costophrenic angle blunting is consistent with effusion or atelectasis. 3. There is a stent in the right mainstem bronchus which is stable.   LOCATION:        Dictated by (CST): Kraig Tristan MD on 1/08/2024 at 7:31 PM     Finalized by (CST): Kraig Tristan MD on 1/08/2024 at 7:32 PM         pantoprazole  40 mg Oral BID AC    enoxaparin  40 mg Subcutaneous Nightly    sodium chloride (hypertonic)  3 mL Nebulization TID    albuterol  2.5 mg Nebulization TID    metoclopramide  10 mg Oral TID AC and HS (2200)    Or    metoclopramide  10 mg Intravenous TID AC and HS (2200)    cetirizine  5 mg Oral Daily    fluticasone furoate-vilanterol  1 puff Inhalation Daily    losartan  50 mg Oral Daily    metoprolol succinate ER  50 mg Oral Daily Beta Blocker    lipase-protease-amylase (Lip-Prot-Amyl)  25,000 Units Oral TID CC    sertraline  100 mg Oral Daily    vancomycin  125 mg Oral Q6H    cephalexin  500 mg Oral TID    lidocaine-menthol  2 patch Transdermal Daily     alum-mag hydroxide-simethicone, diphenhydrAMINE, heparin, baclofen, benzonatate, melatonin, acetaminophen, ondansetron, polyethylene glycol (PEG 3350), sennosides, bisacodyl, fleet enema, morphINE **OR** morphINE **OR** morphINE    ASSESSMENT  Postprandial cough. 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  His most recent imaging testing is unrevealing including on review of 1/4 PET/CT and 12/30 CT scan  Chest pain/pressure - unclear if related to above vs musculoskeletal  Bilateral nodules/infiltrates suspect related to aspiration episodes and/or COVID moreso than malignancy  COVID infection/pneumonia - diagnosed 12/10/2023  bronchoesophageal fistula s/p esophageal stenting (now removed 12/12/2023) and endobronchial stenting at [facility]  Cough,  dysphagia related to above  Metastatic esophageal adenocarcinoma s/p gastroesphagectomy 9/2022  Anemia, thrombocytopenia  GERD     PLAN  Continue close monitoring of respiratory status, wean o2 for sats >89%  continue on nebs for stent patency  off breo inhaler given lack of any improvement  Continue flutter valve to aid secretion clearance  GERD management per GI  Antibiotics as per ID -keflex  Noted plans for ENT eval  Plan on bronchoscopy with airway evaluation this am    Chapo Madrid MD

## 2024-01-11 NOTE — PROGRESS NOTES
[facility]     Hospitalist Progress Note     Dontae Buddy Cortez . Patient Status:  Inpatient    10/15/1970 MRN [id number]   [facility] 4NW-A Attending Jan Brito MD   Hosp Day # 3 PCP Adrian Horowitz MD     Chief Complaint: Dysphagia    Subjective:     Patient continues to have trouble swallowing. Pt was seen by ENT with flex laryngoscopy and had strong gag reflex last night. This morning went for bronchoscopy    Objective:    Review of Systems:   A comprehensive review of systems was completed; pertinent positive and negatives stated in subjective.    Vital signs:  Temp:  [97.8 °F (36.6 °C)-98.4 °F (36.9 °C)] 98 °F (36.7 °C)  Pulse:  [49-61] 61  Resp:  [10-18] 13  BP: (104-144)/(59-80) 127/79  SpO2:  [90 %-100 %] 100 %    Physical Exam:    General: No acute distress, pleasant   Respiratory: no wheezes, no rhonchi  Cardiovascular: S1, S2, regular rate and rhythm  Abdomen: Soft, Non-tender, non-distended, positive bowel sounds  Neuro: No new focal deficits.   Extremities: no edema    Diagnostic Data:    Labs:  Recent Labs   Lab 24  1816   WBC 13.1*   HGB 10.5*   MCV 94.3   .0       Recent Labs   Lab 24  1816 24  0652 01/10/24  0541   * 92  --    BUN 12 10  --    CREATSERUM 0.31* 0.37*  --    CA 8.4* 7.9*  --    ALB 2.8*  --   --     141  --    K 3.1* 3.2* 3.9    107  --    CO2 32.0 32.0  --    ALKPHO 108  --   --    AST 21  --   --    ALT 23  --   --    BILT 0.3  --   --    TP 7.1  --   --        Estimated Creatinine Clearance: 263.4 mL/min (A) (based on SCr of 0.37 mg/dL (L)).    No results for input(s): \"TROP\", \"TROPHS\", \"CK\" in the last 168 hours.    No results for input(s): \"PTP\", \"INR\" in the last 168 hours.               Microbiology    Hospital Encounter on 24   1. Blood Culture     Status: None (Preliminary result)    Collection Time: 24  7:37 PM    Specimen: Blood,peripheral   Result Value Ref Range    Blood Culture Result No  Growth 2 Days N/A         Imaging: Reviewed in Epic.    Medications:    pantoprazole  40 mg Oral BID AC    enoxaparin  40 mg Subcutaneous Nightly    sodium chloride (hypertonic)  3 mL Nebulization TID    albuterol  2.5 mg Nebulization TID    metoclopramide  10 mg Oral TID AC and HS (2200)    Or    metoclopramide  10 mg Intravenous TID AC and HS (2200)    cetirizine  5 mg Oral Daily    fluticasone furoate-vilanterol  1 puff Inhalation Daily    losartan  50 mg Oral Daily    metoprolol succinate ER  50 mg Oral Daily Beta Blocker    lipase-protease-amylase (Lip-Prot-Amyl)  25,000 Units Oral TID CC    sertraline  100 mg Oral Daily    vancomycin  125 mg Oral Q6H    cephalexin  500 mg Oral TID    lidocaine-menthol  2 patch Transdermal Daily       Assessment & Plan:      #Dysphagia  Patient with complex GI history  Barium espophagram reviewed  GI, Pulm following  ENT to eval for possibel laryngospasm, will need continued OP followup  Continue PPI BID  Reglan QID  S/p bronchscopy 1/11, plan to discuss with GI to see if esophageal stent possible     #Chest pain  Likely musculoskeletal due to coughing  Improved with lidocaine patch      #Recent staph pneumonia  Continue antibiotics started PTA  Pulm following  Continue nebs  Flutter valve     #Metastatic esophageal cancer s/p gastroesophagectomy 9/22, metastatic pancreatic cancer   -Follows with Dr. Foster     #H/o bronchoesophageal fistula  -Esophageal stent removed 12/12,   -H/o bronchial stent - follows with Dr. Madrid     #C. Diff diagnosed PTA  -Continue PO vanco if tolerating      #CAD with prior CABG  -Continue home meds if tolerating      #GERD  -PPI      Charles Maguire MD    Supplementary Documentation:     Quality:  DVT Mechanical Prophylaxis:     Early ambuation  DVT Pharmacologic Prophylaxis   Medication    enoxaparin (Lovenox) 40 MG/0.4ML SUBQ injection 40 mg    heparin (Porcine) 100 Units/mL lock flush 500 Units                Code Status: Full Code  Neumann: No  urinary catheter in place  Neumann Duration (in days):   Central line:    SHUBHAM:     Discharge is dependent on: outcome of esophogram   At this point Mr. Cortez is expected to be discharge to: Home    The 21st Century Cures Act makes medical notes like these available to patients in the interest of transparency. Please be advised this is a medical document. Medical documents are intended to carry relevant information, facts as evident, and the clinical opinion of the practitioner. The medical note is intended as peer to peer communication and may appear blunt or direct. It is written in medical language and may contain abbreviations or verbiage that are unfamiliar.

## 2024-01-11 NOTE — PROGRESS NOTES
Pt is alert and oriented x4. Pt has portacath and is npo for bronchoscopy tomorrow. Pt has complained of pain morphine given q 2 hours. Pt has call light in reach and safety measures in place,

## 2024-01-12 ENCOUNTER — ANESTHESIA (OUTPATIENT)
Dept: ENDOSCOPY | Facility: HOSPITAL | Age: 54
End: 2024-01-12
Payer: COMMERCIAL

## 2024-01-12 ENCOUNTER — ANESTHESIA EVENT (OUTPATIENT)
Dept: ENDOSCOPY | Facility: HOSPITAL | Age: 54
End: 2024-01-12
Payer: COMMERCIAL

## 2024-01-12 LAB
ANION GAP SERPL CALC-SCNC: 3 MMOL/L (ref 0–18)
BASOPHILS # BLD AUTO: 0.05 X10(3) UL (ref 0–0.2)
BASOPHILS NFR BLD AUTO: 0.6 %
BUN BLD-MCNC: 9 MG/DL (ref 9–23)
CALCIUM BLD-MCNC: 9 MG/DL (ref 8.5–10.1)
CHLORIDE SERPL-SCNC: 103 MMOL/L (ref 98–112)
CO2 SERPL-SCNC: 31 MMOL/L (ref 21–32)
CREAT BLD-MCNC: 0.51 MG/DL
EGFRCR SERPLBLD CKD-EPI 2021: 120 ML/MIN/1.73M2 (ref 60–?)
EOSINOPHIL # BLD AUTO: 0.46 X10(3) UL (ref 0–0.7)
EOSINOPHIL NFR BLD AUTO: 5.6 %
ERYTHROCYTE [DISTWIDTH] IN BLOOD BY AUTOMATED COUNT: 14.2 %
GLUCOSE BLD-MCNC: 87 MG/DL (ref 70–99)
HCT VFR BLD AUTO: 34.1 %
HGB BLD-MCNC: 10.6 G/DL
IMM GRANULOCYTES # BLD AUTO: 0.14 X10(3) UL (ref 0–1)
IMM GRANULOCYTES NFR BLD: 1.7 %
LYMPHOCYTES # BLD AUTO: 0.79 X10(3) UL (ref 1–4)
LYMPHOCYTES NFR BLD AUTO: 9.6 %
MAGNESIUM SERPL-MCNC: 2.3 MG/DL (ref 1.6–2.6)
MCH RBC QN AUTO: 29.5 PG (ref 26–34)
MCHC RBC AUTO-ENTMCNC: 31.1 G/DL (ref 31–37)
MCV RBC AUTO: 95 FL
MONOCYTES # BLD AUTO: 0.67 X10(3) UL (ref 0.1–1)
MONOCYTES NFR BLD AUTO: 8.2 %
NEUTROPHILS # BLD AUTO: 6.08 X10 (3) UL (ref 1.5–7.7)
NEUTROPHILS # BLD AUTO: 6.08 X10(3) UL (ref 1.5–7.7)
NEUTROPHILS NFR BLD AUTO: 74.3 %
OSMOLALITY SERPL CALC.SUM OF ELEC: 282 MOSM/KG (ref 275–295)
PLATELET # BLD AUTO: 273 10(3)UL (ref 150–450)
POTASSIUM SERPL-SCNC: 4.6 MMOL/L (ref 3.5–5.1)
RBC # BLD AUTO: 3.59 X10(6)UL
SODIUM SERPL-SCNC: 137 MMOL/L (ref 136–145)
WBC # BLD AUTO: 8.2 X10(3) UL (ref 4–11)

## 2024-01-12 PROCEDURE — 0DJ08ZZ INSPECTION OF UPPER INTESTINAL TRACT, VIA NATURAL OR ARTIFICIAL OPENING ENDOSCOPIC: ICD-10-PCS | Performed by: INTERNAL MEDICINE

## 2024-01-12 PROCEDURE — 99232 SBSQ HOSP IP/OBS MODERATE 35: CPT | Performed by: INTERNAL MEDICINE

## 2024-01-12 RX ORDER — SODIUM CHLORIDE, SODIUM LACTATE, POTASSIUM CHLORIDE, CALCIUM CHLORIDE 600; 310; 30; 20 MG/100ML; MG/100ML; MG/100ML; MG/100ML
INJECTION, SOLUTION INTRAVENOUS CONTINUOUS
Status: DISCONTINUED | OUTPATIENT
Start: 2024-01-12 | End: 2024-01-12 | Stop reason: HOSPADM

## 2024-01-12 RX ORDER — ONDANSETRON 2 MG/ML
4 INJECTION INTRAMUSCULAR; INTRAVENOUS EVERY 6 HOURS PRN
Status: DISCONTINUED | OUTPATIENT
Start: 2024-01-12 | End: 2024-01-12 | Stop reason: HOSPADM

## 2024-01-12 RX ORDER — SODIUM CHLORIDE, SODIUM LACTATE, POTASSIUM CHLORIDE, CALCIUM CHLORIDE 600; 310; 30; 20 MG/100ML; MG/100ML; MG/100ML; MG/100ML
INJECTION, SOLUTION INTRAVENOUS CONTINUOUS PRN
Status: DISCONTINUED | OUTPATIENT
Start: 2024-01-12 | End: 2024-01-12 | Stop reason: SURG

## 2024-01-12 RX ORDER — LIDOCAINE HYDROCHLORIDE 10 MG/ML
INJECTION, SOLUTION EPIDURAL; INFILTRATION; INTRACAUDAL; PERINEURAL AS NEEDED
Status: DISCONTINUED | OUTPATIENT
Start: 2024-01-12 | End: 2024-01-12 | Stop reason: SURG

## 2024-01-12 RX ORDER — PROCHLORPERAZINE EDISYLATE 5 MG/ML
5 INJECTION INTRAMUSCULAR; INTRAVENOUS EVERY 8 HOURS PRN
Status: DISCONTINUED | OUTPATIENT
Start: 2024-01-12 | End: 2024-01-12 | Stop reason: HOSPADM

## 2024-01-12 RX ORDER — HYDROMORPHONE HYDROCHLORIDE 1 MG/ML
0.2 INJECTION, SOLUTION INTRAMUSCULAR; INTRAVENOUS; SUBCUTANEOUS EVERY 5 MIN PRN
Status: DISCONTINUED | OUTPATIENT
Start: 2024-01-12 | End: 2024-01-12 | Stop reason: HOSPADM

## 2024-01-12 RX ORDER — HYDROMORPHONE HYDROCHLORIDE 1 MG/ML
INJECTION, SOLUTION INTRAMUSCULAR; INTRAVENOUS; SUBCUTANEOUS
Status: COMPLETED
Start: 2024-01-12 | End: 2024-01-12

## 2024-01-12 RX ORDER — MEPERIDINE HYDROCHLORIDE 25 MG/ML
12.5 INJECTION INTRAMUSCULAR; INTRAVENOUS; SUBCUTANEOUS AS NEEDED
Status: DISCONTINUED | OUTPATIENT
Start: 2024-01-12 | End: 2024-01-12 | Stop reason: HOSPADM

## 2024-01-12 RX ORDER — NALOXONE HYDROCHLORIDE 0.4 MG/ML
0.08 INJECTION, SOLUTION INTRAMUSCULAR; INTRAVENOUS; SUBCUTANEOUS AS NEEDED
Status: DISCONTINUED | OUTPATIENT
Start: 2024-01-12 | End: 2024-01-12 | Stop reason: HOSPADM

## 2024-01-12 RX ORDER — HYDROMORPHONE HYDROCHLORIDE 1 MG/ML
0.4 INJECTION, SOLUTION INTRAMUSCULAR; INTRAVENOUS; SUBCUTANEOUS EVERY 5 MIN PRN
Status: DISCONTINUED | OUTPATIENT
Start: 2024-01-12 | End: 2024-01-12 | Stop reason: HOSPADM

## 2024-01-12 RX ORDER — HYDROMORPHONE HYDROCHLORIDE 1 MG/ML
0.6 INJECTION, SOLUTION INTRAMUSCULAR; INTRAVENOUS; SUBCUTANEOUS EVERY 5 MIN PRN
Status: DISCONTINUED | OUTPATIENT
Start: 2024-01-12 | End: 2024-01-12 | Stop reason: HOSPADM

## 2024-01-12 RX ADMIN — ONDANSETRON 4 MG: 2 INJECTION INTRAMUSCULAR; INTRAVENOUS at 16:25:00

## 2024-01-12 RX ADMIN — SODIUM CHLORIDE, SODIUM LACTATE, POTASSIUM CHLORIDE, CALCIUM CHLORIDE: 600; 310; 30; 20 INJECTION, SOLUTION INTRAVENOUS at 16:12:00

## 2024-01-12 RX ADMIN — LIDOCAINE HYDROCHLORIDE 25 MG: 10 INJECTION, SOLUTION EPIDURAL; INFILTRATION; INTRACAUDAL; PERINEURAL at 16:12:00

## 2024-01-12 NOTE — PROGRESS NOTES
[facility]     Hospitalist Progress Note     Dontae Buddy Cortez  Patient Status:  Inpatient    10/15/1970 MRN [id number]   [facility] 4NW-A Attending Jan Brito MD   Hosp Day # 4 PCP Adrian Horowitz MD     Chief Complaint: Dysphagia    Subjective:     Patient continues to have trouble swallowing. Plan for EGD today with possibel clipping.     Objective:    Review of Systems:   A comprehensive review of systems was completed; pertinent positive and negatives stated in subjective.    Vital signs:  Temp:  [97.5 °F (36.4 °C)-98.7 °F (37.1 °C)] 98.7 °F (37.1 °C)  Pulse:  [50-66] 55  Resp:  [10-18] 18  BP: (104-140)/(61-80) 131/65  SpO2:  [90 %-100 %] 95 %    Physical Exam:    General: No acute distress, pleasant   Respiratory: no wheezes, no rhonchi  Cardiovascular: S1, S2, regular rate and rhythm  Abdomen: Soft, Non-tender, non-distended, positive bowel sounds  Neuro: No new focal deficits.   Extremities: no edema    Diagnostic Data:    Labs:  Recent Labs   Lab 24  0539   WBC 13.1* 8.2   HGB 10.5* 10.6*   MCV 94.3 95.0   .0 273.0       Recent Labs   Lab 24  0652 01/10/24  0541 24  0539   * 92  --  87   BUN 12 10  --  9   CREATSERUM 0.31* 0.37*  --  0.51*   CA 8.4* 7.9*  --  9.0   ALB 2.8*  --   --   --     141  --  137   K 3.1* 3.2* 3.9 4.6    107  --  103   CO2 32.0 32.0  --  31.0   ALKPHO 108  --   --   --    AST 21  --   --   --    ALT 23  --   --   --    BILT 0.3  --   --   --    TP 7.1  --   --   --        Estimated Creatinine Clearance: 191.1 mL/min (A) (based on SCr of 0.51 mg/dL (L)).    No results for input(s): \"TROP\", \"TROPHS\", \"CK\" in the last 168 hours.    No results for input(s): \"PTP\", \"INR\" in the last 168 hours.               Microbiology    Hospital Encounter on 24   1. Blood Culture     Status: None (Preliminary result)    Collection Time: 24  7:37 PM    Specimen: Blood,peripheral   Result  Value Ref Range    Blood Culture Result No Growth 3 Days N/A         Imaging: Reviewed in Epic.    Medications:    pantoprazole  40 mg Oral BID AC    enoxaparin  40 mg Subcutaneous Nightly    sodium chloride (hypertonic)  3 mL Nebulization TID    albuterol  2.5 mg Nebulization TID    metoclopramide  10 mg Oral TID AC and HS (2200)    Or    metoclopramide  10 mg Intravenous TID AC and HS (2200)    cetirizine  5 mg Oral Daily    fluticasone furoate-vilanterol  1 puff Inhalation Daily    losartan  50 mg Oral Daily    metoprolol succinate ER  50 mg Oral Daily Beta Blocker    lipase-protease-amylase (Lip-Prot-Amyl)  25,000 Units Oral TID CC    sertraline  100 mg Oral Daily    vancomycin  125 mg Oral Q6H    cephalexin  500 mg Oral TID    lidocaine-menthol  2 patch Transdermal Daily       Assessment & Plan:      #Dysphagia  Patient with complex GI history  Barium espophagram reviewed  GI, Pulm following  ENT to eval for possibel laryngospasm, will need continued OP followup  Continue PPI BID  Reglan QID  S/p bronchscopy 1/11,   GI for EGD with clipping today     #Chest pain  Likely musculoskeletal due to coughing  Improved with lidocaine patch      #Recent staph pneumonia  Continue antibiotics started PTA  Pulm following  Continue nebs  Flutter valve     #Metastatic esophageal cancer s/p gastroesophagectomy 9/22, metastatic pancreatic cancer   -Follows with Dr. Foster     #H/o bronchoesophageal fistula  -Esophageal stent removed 12/12,   -H/o bronchial stent - follows with Dr. Madrid     #C. Diff diagnosed PTA  -Continue PO vanco if tolerating      #CAD with prior CABG  -Continue home meds if tolerating      #GERD  -PPI      Charles Maguire MD    Supplementary Documentation:     Quality:  DVT Mechanical Prophylaxis:     Early ambuation  DVT Pharmacologic Prophylaxis   Medication    enoxaparin (Lovenox) 40 MG/0.4ML SUBQ injection 40 mg    heparin (Porcine) 100 Units/mL lock flush 500 Units                Code Status: Full  Code  Neumann: No urinary catheter in place  Neumann Duration (in days):   Central line:    SHUBHAM:     Discharge is dependent on: outcome of esophogram   At this point Mr. Cortez is expected to be discharge to: Home    The 21st Century Cures Act makes medical notes like these available to patients in the interest of transparency. Please be advised this is a medical document. Medical documents are intended to carry relevant information, facts as evident, and the clinical opinion of the practitioner. The medical note is intended as peer to peer communication and may appear blunt or direct. It is written in medical language and may contain abbreviations or verbiage that are unfamiliar.

## 2024-01-12 NOTE — PROGRESS NOTES
[facility] Pulmonary/Critical Care Progress Note     SUBJECTIVE/Interval history:  No acute events overnight, no change in postprandial cough. No fevers.   Remains on RA    Review of Systems:   A comprehensive 14 point review of systems was completed.   Pertinent positives and negatives noted in the HPI.    OBJECTIVE:  Vitals:    01/11/24 1142 01/11/24 1933 01/11/24 2200 01/12/24 0300   BP: 128/76  135/69 131/65   BP Location:   Right arm    Pulse: 50 66 54 55   Resp: 14 15 16 18   Temp: 97.5 °F (36.4 °C)  97.8 °F (36.6 °C) 98.7 °F (37.1 °C)   TempSrc: Oral  Oral Oral   SpO2: 94%  95%    Weight:       Height:           Vital signs in last 24 hours:  Blood pressure 131/65, pulse 55, temperature 98.7 °F (37.1 °C), temperature source Oral, resp. rate 18, height 6' 2\" (1.88 m), weight 180 lb (81.6 kg), SpO2 95%.     Intake/Output:  No intake or output data in the 24 hours ending 01/12/24 1537          Physical Exam:  General: Appears alert, comfortable. No acute distress  Lungs:CTAB no wheeze  Heart:RRR no m  Abdomen:Soft, non-tender.  No masses.  No guarding.  No rebound.  Extremities:no edema    Lab Data Review:   Recent Labs   Lab 01/08/24 1816 01/09/24  0652 01/10/24  0541 01/12/24  0539   * 92  --  87   BUN 12 10  --  9   CREATSERUM 0.31* 0.37*  --  0.51*   CA 8.4* 7.9*  --  9.0    141  --  137   K 3.1* 3.2* 3.9 4.6    107  --  103   CO2 32.0 32.0  --  31.0     Recent Labs   Lab 01/08/24 1816 01/12/24  0539   RBC 3.51* 3.59*   HGB 10.5* 10.6*   HCT 33.1* 34.1*   MCV 94.3 95.0   MCH 29.9 29.5   MCHC 31.7 31.1   RDW 14.0 14.2   NEPRELIM 10.30* 6.08   WBC 13.1* 8.2   .0 273.0     No results for input(s): \"BNP\" in the last 168 hours.  No results for input(s): \"TROP\", \"CK\" in the last 168 hours.  No results for input(s): \"PT\", \"INR\", \"PTT\" in the last 168 hours.  No results for input(s): \"ABGPHT\", \"DRHYEL7M\", \"DZITE2T\", \"ABGHCO3\", \"ABGBE\", \"TEMP\", \"TACOS\", \"SITE\", \"DEV\",  \"THGB\" in the last 168 hours.    Invalid input(s): \"PFB26VMP\", \"CHOB\"    Other Labs:  Interval Culture Data:   Hospital Encounter on 01/08/24   1. Blood Culture     Status: None (Preliminary result)    Collection Time: 01/08/24  7:37 PM    Specimen: Blood,peripheral   Result Value Ref Range    Blood Culture Result No Growth 3 Days N/A     No results for input(s): \"COLORUR\", \"CLARITY\", \"SPECGRAVITY\", \"GLUUR\", \"BILUR\", \"KETUR\", \"BLOODURINE\", \"PHURINE\", \"PROUR\", \"UROBILINOGEN\", \"NITRITE\", \"LEUUR\", \"WBCUR\", \"RBCUR\", \"BACUR\", \"EPIUR\" in the last 168 hours.    Interval Radiology:   No results found.     pantoprazole  40 mg Oral BID AC    enoxaparin  40 mg Subcutaneous Nightly    sodium chloride (hypertonic)  3 mL Nebulization TID    albuterol  2.5 mg Nebulization TID    metoclopramide  10 mg Oral TID AC and HS (2200)    Or    metoclopramide  10 mg Intravenous TID AC and HS (2200)    cetirizine  5 mg Oral Daily    fluticasone furoate-vilanterol  1 puff Inhalation Daily    losartan  50 mg Oral Daily    metoprolol succinate ER  50 mg Oral Daily Beta Blocker    lipase-protease-amylase (Lip-Prot-Amyl)  25,000 Units Oral TID CC    sertraline  100 mg Oral Daily    vancomycin  125 mg Oral Q6H    cephalexin  500 mg Oral TID    lidocaine-menthol  2 patch Transdermal Daily     HYDROcodone-acetaminophen, alum-mag hydroxide-simethicone, diphenhydrAMINE, heparin, baclofen, benzonatate, melatonin, acetaminophen, ondansetron, polyethylene glycol (PEG 3350), sennosides, bisacodyl, fleet enema, morphINE **OR** morphINE **OR** morphINE    ASSESSMENT  Postprandial cough. 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  His most recent imaging testing is unrevealing including on review of 1/4 PET/CT and 12/30 CT scan  Chest pain/pressure - unclear if related to above vs musculoskeletal  Bilateral nodules/infiltrates suspect related to aspiration episodes and/or COVID moreso than malignancy  COVID  infection/pneumonia - diagnosed 12/10/2023  bronchoesophageal fistula s/p esophageal stenting (now removed 12/12/2023) and endobronchial stenting at [facility]  Cough, dysphagia related to above  Metastatic esophageal adenocarcinoma s/p gastroesphagectomy 9/2022  Anemia, thrombocytopenia  GERD     PLAN  Note yesterday's bronchoscopy operative report - the endobronchial stent does not provide complete occlusion/obstruction of the fistula with observed bile aspiration during the procedure.  Discussion with GI with plan to either consider GI procedure vs return to [facility] for endobronchial stent revision  continue on nebs for stent patency  No objection to discharge from pulmonary standpoint post EGD today.    Our service will follow peripherally.  Please call with questions/concerns    Chapo Madrid MD

## 2024-01-12 NOTE — OPERATIVE REPORT
Dontae Cortez Jr. Patient Status:  Inpatient    10/15/1970 MRN [id number]   [facility] Attending Charles Maguire MD   Date 2024 PCP Adrian Horowitz MD     PREOPERATIVE DIAGNOSIS/INDICATION: Esophagobronchial fistula, prior esophageal and bronchial stents, with improvement and later recurrence of symptoms, esophageal stent s/p retrieval, persistent symptoms  POSTOPERTATIVE DIAGNOSIS: Same  PROCEDURE PERFORMED: EGD   TIME OUT WAS PERFORMED     SEDATION: MAC sedation provided by General Anesthesia     INFORMED CONSENT: Risks, benefits and alternatives to the procedure were explained to the patient including but not limited to bleeding, infection, perforation, adverse drug reactions, pancreatitis and the need for hospitalization and surgery if this occurs, the patient understands and agrees to procedure.  PROCEDURE DESCRIPTION: A THERAPEUTIC DOUBLE CHANNEL upper endoscope was introduced into the patient’s mouth, hypo pharynx, esophagus, stomach and the first and second portion of the duodenum. Careful examination of the above described areas was performed on withdrawal of the endoscope. The patient tolerated the procedure well, there were no immediate complication immediately following the procedure, and the patient was transferred to recovery in stable condition.  FINDINGS:  ESOPHAGUS: Post surgical changes c/w distal esophagectomy and gastric pull through, anastomosis healed ulcer scar and at its base showed a fistula 2 mm tract opening  GASTRIC: Post surgical changes and gastric pull through, patent pyloric, post POEM scar at antrum, migrated esophageal stent into gastric lumen  DUODENUM: Normal  THERAPEUTICS:  We attempted to place an over the scope clip but unable to to position of fistula on scarred post surgical esophagus and  scarring at healed ulcer base but was unsuccessful   RECOMMENDATIONS:   Post EGD precautions, watch for bleeding, infection, perforation,  adverse drug reactions   Diet as tolerated  Obtain consent for placement of ASD occluder (atrial septal defect) in combination with pulmonary service to remove bronchial stent at same time, potentially on Tuesday next week after discussing with patient and family over the weekend.     Raheel Ritchie MD  1/12/2024  4:34 PM

## 2024-01-12 NOTE — PLAN OF CARE
Patient is alert, c/o 9/10 pain - PRN per MAR. Patient declined IVF. NPO for procedure. Taken down to procedure and returned on cart. VSS. Call light within reach.

## 2024-01-12 NOTE — PAYOR COMM NOTE
--------------  1/10 -12 CONTINUED STAY REVIEW    Payor: [employer]  Subscriber #:  [id number]  Authorization Number: [id number]    1/10:     HOSPITALIST:    Patient continues to have trouble swallowing. Still coughing/choking at times. No F/C. No N/V. States swallowing is worse with liquids but can tolerate solids.      Vital signs:  Temp:  [97.5 °F (36.4 °C)-98.2 °F (36.8 °C)] 97.8 °F (36.6 °C)  Pulse:  [56-65] 60  Resp:  [16-18] 18  BP: (138-160)/(66-76) 153/76  SpO2:  [94 %-100 %] 96 %     Physical Exam:    General: No acute distress, pleasant   Respiratory: no wheezes, no rhonchi  Cardiovascular: S1, S2, regular rate and rhythm  Abdomen: Soft, Non-tender, non-distended, positive bowel sounds  Neuro: No new focal deficits.   Extremities: no edema            Recent Labs   Lab 01/08/24  1816 01/09/24  0652 01/10/24  0541   * 92  --    BUN 12 10  --    CREATSERUM 0.31* 0.37*  --    CA 8.4* 7.9*  --    ALB 2.8*  --   --     141  --    K 3.1* 3.2* 3.9    107  --    CO2 32.0 32.0  --    ALKPHO 108  --   --    AST 21  --   --    ALT 23  --   --    BILT 0.3  --   --    TP 7.1  --   --           Medications:    pantoprazole  40 mg Oral BID AC    sodium chloride (hypertonic)  3 mL Nebulization TID    albuterol  2.5 mg Nebulization TID    metoclopramide  10 mg Oral TID AC and HS (2200)     Or    metoclopramide  10 mg Intravenous TID AC and HS (2200)    cetirizine  5 mg Oral Daily    fluticasone furoate-vilanterol  1 puff Inhalation Daily    losartan  50 mg Oral Daily    metoprolol succinate ER  50 mg Oral Daily Beta Blocker    lipase-protease-amylase (Lip-Prot-Amyl)  25,000 Units Oral TID CC    sertraline  100 mg Oral Daily    vancomycin  125 mg Oral Q6H    enoxaparin  40 mg Subcutaneous Daily    cephalexin  500 mg Oral TID    lidocaine-menthol  2 patch Transdermal Daily          Assessment & Plan:   #Dysphagia  Patient with complex GI history  Barium espophagram reviewed  GI, Pulm  following  ENT to eval for possibel laryngospasm  Continue PPI BID  Reglan QID     #Chest pain  Likely musculoskeletal due to coughing  Improved with lidocaine patch      #Recent staph pneumonia  Continue antibiotics started PTA  Pulm following  Continue nebs  Flutter valve     #Metastatic esophageal cancer s/p gastroesophagectomy 9/22, metastatic pancreatic cancer   -Follows with Dr. Foster     #H/o bronchoesophageal fistula  -Esophageal stent removed 12/12  -H/o bronchial stent - follows with Dr. Madrid     #C. Diff diagnosed PTA  -Continue PO vanco if tolerating      #CAD with prior CABG  -Continue home meds if tolerating      #GERD  -PPI         PULM:    ASSESSMENT  Postprandial cough. 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  His most recent imaging testing is unrevealing including on review of 1/4 PET/CT and 12/30 CT scan  Chest pain/pressure - unclear if related to above vs musculoskeletal  Bilateral nodules/infiltrates suspect related to aspiration episodes and/or COVID moreso than malignancy  COVID infection/pneumonia - diagnosed 12/10/2023  bronchoesophageal fistula s/p esophageal stenting (now removed 12/12/2023) and endobronchial stenting at [facility]  Cough, dysphagia related to above  Metastatic esophageal adenocarcinoma s/p gastroesphagectomy 9/2022  Anemia, thrombocytopenia  GERD     PLAN  Continue close monitoring of respiratory status, wean o2 for sats >89%  continue on nebs for stent patency  off breo inhaler given lack of any improvement  Continue flutter valve to aid secretion clearance  GERD management per GI  Antibiotics as per ID -keflex  Noted plans for ENT eval  Will plan on bronchoscopy with airway evaluation to ensure no other issues/fistulae and appropriate stent positioning - plan on bronchoscopy tomorrow morning    ENT:    Dontae Cortez Jr. is a a(n) 53 year old male. History of esophageal carcinoma treated with esophagectomy.  Developed  bronchoesophageal fistula s/p esophageal and endobronchial stenting.  Recent dysphagia with liquids.  Frequent cough after swallow and chest pain.  Excessive mucous.  No voice changes.   Video swallow an 1/2 did not show aspiration but he did have occasional cough response       Flexible Laryngoscopy Procedure Note (24981)     Due to inability for adequate examination of the larynx and need for magnification to perform the examination, endoscopy was performed.  Risks and benefits were discussed with patient/family and they have given verbal consent to proceed.     Pre-operative Diagnosis:   1. Dysphagia, unspecified type    2. Shortness of breath    3. Hypokalemia        Post-operative Diagnosis: Same   Procedure: Diagnostic flexible fiberoptic laryngoscopy   Anesthesia: Topical Tetracaine with Ephedrine      Procedure Details:  A flexible scope was passed into the right nasal cavity.  It was advanced through the nasopharyx and oropharynx. The hypopharynx, supraglottis and glottis were then examined. The mobility of the true vocal cords was assessed. The scope was then withdrawn.     Findings: All the structures listed above in the procedure description appeared normal, except as follows:   Limited exam due to strong gag reflex.  TVC appear mobile.       Condition: Stable     Complications: Patient tolerated the procedure well with no immediate complications.          Strong gag response with flexible laryngoscopy - consistent with laryngeal hypersensitivity.  Limited view of TVC.  Scheduled for bronch tomorrow.  Recommend F/U with [facility] (Laryngology) as outpatient for repeat scope.         1/11:    PERATIVE REPORT:     DATE OF OPERATION: 01/11/24     PREOPERATIVE DIAGNOSIS(ES): chronic cough, bronchoesophageal fistula     POSTOPERATIVE DIAGNOSIS(ES): bronchoesophageal fistula in medial aspect of the distal right main stem      OPERATION(S) PERFORMED: Bronchoscopy with airway inspection     SURGEON: Chapo  MD Julius     ANESTHESIA: MAC sedation; please see separate flow sheet.      EQUIPMENT:   Olympus adult videobronchoscope.      INDICATION: The patient is a 53 year old male with history of esophageal cancer s/p chemoRT and gastroesophagectomy 9/2022 with history of bronchoesophageal fistula with previous esophageal stent and most recently with endobronchial stent in the right main stem who was admitted with recurrent postprandial cough with liquids and solids.  He has had negative swallow studies and now undergoing bronchoscopy to evaluate the airway, bronchoesophageal fistula and stent positioning.     CONSENT:  Risks and benefits were reviewed with the patient in detail regarding the procedure as well as anesthesia prior to the procedure. All questions were answered, and the patient was agreeable.     PROCEDURE:  After informed consent was obtained, the patient was brought to the bronchoscopy suite.  Time out performed.  Patient was placed in a supine position, anesthesia administered, and topical lidocaine given for local anesthesia.      First, the videobronchoscope was used and inserted orally through the bite block. The upper airways appeared normal. The vocal cords were insufficiently evaluated due to the level of sedation but appeared normal and approximated well. The bronchoscope was then advanced into the trachea. Evaluation of the trachea and left sided airways demonstrated normal endobronchial examination to the subsegmental level.   Upon evaluation of the right lung, a covered endobronchial stent was positioned in the right main stem with the proximal end just at the takeoff of the right main stem bronchus. The distal end of the stent terminated in the proximal bronchus intermedius.  The right upper lobe takeoff could be seen lateral to the stent proximally. The stent is not seated fully against the medial aspect of the right main stem.  During the course of bronchoscopy, especially with coughing,  bile was seen to emanate from the medial aspect of the right main stem beneath the stent. I was not able to visualize the bronchoesophageal fistula due to the stent placement.   Aside from the above findings, there were no other significant abnormalities noted, specifically no other fistulae.   There was no evidence of bleeding and the bronchoscope was then withdrawn. The patient tolerated the procedure well without immediate complications.     EBL:  none     IMPRESSION:   Right main stem bronchoesophageal fistula with endobronchial stent positioned in the right main stem.      PLAN:  Will await review with Dr. Ritchie if an esophageal stent would be possible/feasible. If not, then I would recommend he be evaluated with [facility] Moura at [facility] to consider bronchoscopy and stent revision      GI:  Chief Complaint: Chronic cough, hx of bronchoesophageal fistula   S: Called back to assess pt as pt underwent bronchoscopy today which revealed bronchoesophageal fistula in medial aspect of the distal right main stem.   Pt with ongoing cough. No abd pain, no nausea.   O: /76   Pulse 50   Temp 97.5 °F (36.4 °C) (Oral)   Resp 14   Ht 6' 2\" (1.88 m)   Wt 180 lb (81.6 kg)   SpO2 94%   BMI 23.11 kg/m²   Gen: AAOx3  CV: Bradycardic, regular   Resp: Diminished bilaterally; No increase in respiratory effort   Abd: (+)BS, soft, non-tender, non-distended; no rebound or guarding  Ext: No edema or cyanosis  Assessment: 53 yr-old male with hx of CAD s/p CABG, HTN, dyslipidemia, and distal esophageal adenocarcinoma (dx 6/2022) s/p chemoRT + s/p esophagectomy (9/2022; Dr Lu) with recovery c/b anastomotic leak s/p endoscopic closure with stenting and course further c/b GOO s/p EGD with esophageal stenting and Botox (11/2022). More recently pt noted to have pancreatic head mass (4/2023) with bx revealing poorly differentiated carcinoma. Over the last several months pt has been struggling with bronchoesophageal fistula which has  been treated with esophageal stenting (since removed) and also bronchial stenting (in-place; [facility]). Pt with recurrent cough and today noted to have recurrent fistula in medial aspect of the distal right main stem despite normal esophogram. Discussed with interventional GI (Dr Ritchie) and plan for EGD with possible clipping of fistula   The risks, benefits, alternatives of the procedure including the risks of anesthesia, bleeding, perforation, missed lesions, need for surgery, and infection were discussed with the patient. He expressed understanding of the risks and was agreeable to proceed.  Plan:   EGD under MAC with possible clipping with Dr Ritchie on 1/12/24  NPO at midnight  Continue PPI BID + Reglan TID AC and HS  CBC, BMP, Mg correcting electrolytes per Hospitalist recommendations     1/12:  HOSPITALIST:  Patient continues to have trouble swallowing. Plan for EGD today with possibel clipping.       Vital signs:  Temp:  [97.5 °F (36.4 °C)-98.7 °F (37.1 °C)] 98.7 °F (37.1 °C)  Pulse:  [50-66] 55  Resp:  [10-18] 18  BP: (104-140)/(61-80) 131/65  SpO2:  [90 %-100 %] 95 %     Physical Exam:    General: No acute distress, pleasant   Respiratory: no wheezes, no rhonchi  Cardiovascular: S1, S2, regular rate and rhythm  Abdomen: Soft, Non-tender, non-distended, positive bowel sounds  Neuro: No new focal deficits.   Extremities: no edema     Lab 01/08/24 1816 01/12/24  0539   WBC 13.1* 8.2   HGB 10.5* 10.6*   MCV 94.3 95.0   .0 273.0       Lab 01/08/24  1816 01/09/24  0652 01/10/24  0541 01/12/24  0539   * 92  --  87   BUN 12 10  --  9   CREATSERUM 0.31* 0.37*  --  0.51*   CA 8.4* 7.9*  --  9.0   ALB 2.8*  --   --   --     141  --  137   K 3.1* 3.2* 3.9 4.6    107  --  103   CO2 32.0 32.0  --  31.0       Medications:    pantoprazole  40 mg Oral BID AC    enoxaparin  40 mg Subcutaneous Nightly    sodium chloride (hypertonic)  3 mL Nebulization TID    albuterol  2.5 mg Nebulization TID     metoclopramide  10 mg Oral TID AC and HS (2200)     Or    metoclopramide  10 mg Intravenous TID AC and HS (2200)    cetirizine  5 mg Oral Daily    fluticasone furoate-vilanterol  1 puff Inhalation Daily    losartan  50 mg Oral Daily    metoprolol succinate ER  50 mg Oral Daily Beta Blocker    lipase-protease-amylase (Lip-Prot-Amyl)  25,000 Units Oral TID CC    sertraline  100 mg Oral Daily    vancomycin  125 mg Oral Q6H    cephalexin  500 mg Oral TID    lidocaine-menthol  2 patch Transdermal Daily       Assessment & Plan:   #Dysphagia  Patient with complex GI history  Barium espophagram reviewed  GI, Pulm following  ENT to eval for possibel laryngospasm, will need continued OP followup  Continue PPI BID  Reglan QID  S/p bronchscopy 1/11,   GI for EGD with clipping today     #Chest pain  Likely musculoskeletal due to coughing  Improved with lidocaine patch      #Recent staph pneumonia  Continue antibiotics started PTA  Pulm following  Continue nebs  Flutter valve     #Metastatic esophageal cancer s/p gastroesophagectomy 9/22, metastatic pancreatic cancer   -Follows with Dr. Foster     #H/o bronchoesophageal fistula  -Esophageal stent removed 12/12,   -H/o bronchial stent - follows with Dr. Madrid     #C. Diff diagnosed PTA  -Continue PO vanco if tolerating      #CAD with prior CABG  -Continue home meds if tolerating      #GERD  -PPI  MEDICATIONS ADMINISTERED IN LAST 1 DAY:  albuterol (Ventolin) (2.5 MG/3ML) 0.083% nebulizer solution 2.5 mg       Date Action Dose Route User    1/12/2024 1341 Given 2.5 mg Nebulization Rogelio Gifford RCP    1/12/2024 0844 Given 2.5 mg Nebulization Rogelio Gifford RCP    1/11/2024 1933 Given 2.5 mg Nebulization Lakshmi Dempsey, MURIEL          alum-mag hydroxide-simethicone (Maalox) 200-200-20 MG/5ML oral suspension 30 mL       Date Action Dose Route User    1/12/2024 0802 Given 30 mL Oral WhitfieldMaryanne valentin RN    1/11/2024 1752 Given 30 mL Oral WhitfieldMaryanne valentin RN          benzonatate  (Tessalon) cap 100 mg       Date Action Dose Route User    1/11/2024 2227 Given 100 mg Oral Luly Mesa RN          cephalexin (Keflex) cap 500 mg       Date Action Dose Route User    1/12/2024 0755 Given 500 mg Oral Maryanne Whitfield RN    1/11/2024 2009 Given 500 mg Oral Luly Mesa RN          cetirizine (ZyrTEC) tab 5 mg       Date Action Dose Route User    1/12/2024 0755 Given 5 mg Oral Maryanne Whitfield RN          fluticasone furoate-vilanterol (Breo Ellipta) 100-25 MCG/ACT inhaler 1 puff       Date Action Dose Route User    1/12/2024 0756 Given 1 puff Inhalation Maryanne Whitfield RN          HYDROcodone-acetaminophen (Norco) 5-325 MG per tab 1 tablet       Date Action Dose Route User    1/12/2024 0922 Given 1 tablet Oral Maryanne Whitfield RN    1/11/2024 1858 Given 1 tablet Oral Maryanne Whitfield RN          lactated ringers infusion 100/hr      Date Action Dose Route User    1/12/2024 1612 New Bag (none) Intravenous El Oquendo MD          lidocaine PF (Xylocaine-MPF) 1% injection       Date Action Dose Route User    1/12/2024 1612 Given 25 mg Injection El Oquendo MD          losartan (Cozaar) tab 50 mg       Date Action Dose Route User    1/12/2024 0755 Given 50 mg Oral Maryanne Whitfield RN          metoclopramide (Reglan) tab 10 mg       Date Action Dose Route User    1/11/2024 1747 Given 10 mg Oral Maryanne Whitfield RN          metoclopramide (Reglan) 5 mg/mL injection 10 mg       Date Action Dose Route User    1/12/2024 1214 Given 10 mg Intravenous Maryanne Whitfield RN    1/12/2024 0512 Given 10 mg Intravenous Luly Mesa RN    1/11/2024 2238 Given 10 mg Intravenous Luly Mesa RN          metoprolol succinate ER (Toprol XL) 24 hr tab 50 mg       Date Action Dose Route User    1/12/2024 0512 Given 50 mg Oral Luly Mesa RN          morphINE PF 4 MG/ML injection 4 mg       Date Action Dose Route User    1/12/2024 1418 Given 4 mg Intravenous Maryanne Whitfield RN     1/12/2024 1214 Given 4 mg Intravenous Maryanne Whitfield RN    1/12/2024 1009 Given 4 mg Intravenous Maryanne Whitfield RN    1/12/2024 0755 Given 4 mg Intravenous Maryanne Whitfield RN    1/12/2024 0512 Given 4 mg Intravenous Luly Mesa RN    1/12/2024 0308 Given 4 mg Intravenous Luly Mesa RN    1/12/2024 0112 Given 4 mg Intravenous Luly Mesa RN    1/11/2024 2238 Given 4 mg Intravenous Luly Mesa RN    1/11/2024 2036 Given 4 mg Intravenous Luly Mesa RN    1/11/2024 1842 Given 4 mg Intravenous Maryanne Whitfield RN          ondansetron (Zofran) 4 MG/2ML injection 4 mg       Date Action Dose Route User    1/12/2024 1625 Given 4 mg Intravenous PrzybylEl MD          pancrelipase (Lip-Prot-Amyl) (Zenpep) DR particles cap 25,000 Units       Date Action Dose Route User    1/11/2024 1747 Given 25,000 Units Oral Maryanne Whitfield RN          pantoprazole (Protonix) DR tab 40 mg       Date Action Dose Route User    1/12/2024 0512 Given 40 mg Oral Luly Mesa RN    1/11/2024 1748 Given 40 mg Oral Maryanne Whitfield RN          propofol (Diprivan) 10 MG/ML injection       Date Action Dose Route User    1/12/2024 1612 Given 200 mg Intravenous PrzybEl baker MD          sertraline (Zoloft) tab 100 mg       Date Action Dose Route User    1/12/2024 0755 Given 100 mg Oral Maryanne Whitfield RN          sodium chloride (hypertonic) 3 % nebulizer solution 3 mL       Date Action Dose Route User    1/12/2024 1341 Given 3 mL Nebulization Rogeilo Gifford RCP    1/12/2024 0844 Given 3 mL Nebulization Rogelio Gifford RCP    1/11/2024 1935 Given 3 mL Nebulization Lakshmi Dempsey RCP          succinylcholine (Anectine) 20 MG/ML injection       Date Action Dose Route User    1/12/2024 1613 Given 100 mg Intravenous El Oquendo MD          vancomycin (Vancocin) cap 125 mg       Date Action Dose Route User    1/12/2024 1214 Given 125 mg Oral Maryanne Whitfield RN    1/12/2024 0541  Given 125 mg Oral Luly Mesa RN    1/11/2024 2238 Given 125 mg Oral Luly Mesa RN    1/11/2024 1748 Given 125 mg Oral Maryanne Whitfield RN

## 2024-01-12 NOTE — RESPIRATORY THERAPY NOTE
Received patient on room air. Patient given DuoNeb and 3% as ordered. Patient breath sounds remain diminished before and after treatments. Patient has strong, non-productive cough. Patient tolerated treatments well.     Chris Daniels, RRT

## 2024-01-12 NOTE — ANESTHESIA PROCEDURE NOTES
Airway  Date/Time: 1/12/2024 4:15 PM  Urgency: elective    Airway not difficult    General Information and Staff    Patient location during procedure: OR  Anesthesiologist: El Oquendo MD  Performed: anesthesiologist   Performed by: El Oquendo MD  Authorized by: El Oquendo MD      Indications and Patient Condition  Indications for airway management: anesthesia  Sedation level: deep  Preoxygenated: yes  Patient position: sniffing  Mask difficulty assessment: 1 - vent by mask  Planned trial extubation    Final Airway Details  Final airway type: endotracheal airway      Successful airway: ETT  Cuffed: yes   Successful intubation technique: direct laryngoscopy  Endotracheal tube insertion site: oral  Blade: Sowmya  Blade size: #3  ETT size (mm): 7.5    Cormack-Lehane Classification: grade I - full view of glottis  Placement verified by: capnometry   Measured from: lips  ETT to lips (cm): 20  Number of attempts at approach: 1

## 2024-01-12 NOTE — ANESTHESIA POSTPROCEDURE EVALUATION
[facility]    Dontae Cortez  Patient Status:  Inpatient   Age/Gender 53 year old male MRN [id number]   Location [facility] Attending Charles Maguire MD   Hosp Day # 4 PCP Adrian Horowitz MD       Anesthesia Post-op Note    ESOPHAGOGASTRODUODENOSCOPY (EGD)    Procedure Summary       Date: 01/12/24 Room / Location:  ENDOSCOPY 01 /  ENDOSCOPY    Anesthesia Start: 1611 Anesthesia Stop: 1644    Procedure: ESOPHAGOGASTRODUODENOSCOPY (EGD) Diagnosis: (ESOPHAGOBRONCHIAL FISTULA)    Surgeons: Raheel Ritchie MD Anesthesiologist: El Oquendo MD    Anesthesia Type: general ASA Status: 3            Anesthesia Type: general    Vitals Value Taken Time   /70 01/12/24 1644   Temp 98.3 °F (36.8 °C) 01/12/24 1644   Pulse 73 01/12/24 1644   Resp 19 01/12/24 1644   SpO2 98 % 01/12/24 1644   Vitals shown include unfiled device data.    Patient Location: PACU    Anesthesia Type: general    Airway Patency: patent and extubated    Postop Pain Control: adequate    Mental Status: mildly sedated but able to meaningfully participate in the post-anesthesia evaluation    Nausea/Vomiting: none    Cardiopulmonary/Hydration status: stable euvolemic    Complications: no apparent anesthesia related complications    Postop vital signs: stable    Dental Exam: Unchanged from Preop

## 2024-01-12 NOTE — PROGRESS NOTES
Pt is alert and oriented x4. Pt has portacath and is up and about. Pt has complaints of pain and getting morphine 4 mg q 2 hours. Pt has call light in reach and is npo for egd in the am.

## 2024-01-12 NOTE — ANESTHESIA PREPROCEDURE EVALUATION
PRE-OP EVALUATION    Patient Name: Dontae Cortez Jr.    Admit Diagnosis: Shortness of breath [R06.02]  Hypokalemia [E87.6]  Dysphagia, unspecified type [R13.10]    Pre-op Diagnosis: INPT    ESOPHAGOGASTRODUODENOSCOPY with fluoroscopy and possible clipping, closure of fistula    Anesthesia Procedure: ESOPHAGOGASTRODUODENOSCOPY with fluoroscopy and possible clipping, closure of fistula    Surgeon(s) and Role:     * Raheel Ritchie MD - Primary    Pre-op vitals reviewed.  Temp: 98.7 °F (37.1 °C)  Pulse: 55  Resp: 18  BP: 131/65  SpO2: 95 %  Body mass index is 23.11 kg/m².    Current medications reviewed.  Hospital Medications:  • lactated ringers infusion   Intravenous Continuous   • [] naloxone (Narcan) 0.4 MG/ML injection 0.08 mg  0.08 mg Intravenous Once PRN   • HYDROcodone-acetaminophen (Norco) 5-325 MG per tab 1 tablet  1 tablet Oral Q6H PRN   • pantoprazole (Protonix) DR tab 40 mg  40 mg Oral BID AC   • alum-mag hydroxide-simethicone (Maalox) 200-200-20 MG/5ML oral suspension 30 mL  30 mL Oral QID PRN   • enoxaparin (Lovenox) 40 MG/0.4ML SUBQ injection 40 mg  40 mg Subcutaneous Nightly   • [COMPLETED] potassium chloride (K-Dur) tab 40 mEq  40 mEq Oral Once   • sodium chloride (hypertonic) 3 % nebulizer solution 3 mL  3 mL Nebulization TID   • albuterol (Ventolin) (2.5 MG/3ML) 0.083% nebulizer solution 2.5 mg  2.5 mg Nebulization TID   • diphenhydrAMINE (Benadryl) 12.5 MG/5ML oral liquid 12.5 mg  12.5 mg Oral QID PRN   • metoclopramide (Reglan) tab 10 mg  10 mg Oral TID AC and HS (0)    Or   • metoclopramide (Reglan) 5 mg/mL injection 10 mg  10 mg Intravenous TID AC and HS (2200)   • heparin (Porcine) 100 Units/mL lock flush 500 Units  5 mL Intravenous PRN   • [COMPLETED] morphINE PF 4 MG/ML injection 4 mg  4 mg Intravenous Once   • baclofen (Lioresal) tab 10 mg  10 mg Oral TID PRN   • benzonatate (Tessalon) cap 100 mg  100 mg Oral TID PRN   • cetirizine (ZyrTEC) tab 5 mg  5 mg Oral Daily   •  fluticasone furoate-vilanterol (Breo Ellipta) 100-25 MCG/ACT inhaler 1 puff  1 puff Inhalation Daily   • losartan (Cozaar) tab 50 mg  50 mg Oral Daily   • metoprolol succinate ER (Toprol XL) 24 hr tab 50 mg  50 mg Oral Daily Beta Blocker   • pancrelipase (Lip-Prot-Amyl) (Zenpep) DR particles cap 25,000 Units  25,000 Units Oral TID CC   • sertraline (Zoloft) tab 100 mg  100 mg Oral Daily   • vancomycin (Vancocin) cap 125 mg  125 mg Oral Q6H   • melatonin tab 3 mg  3 mg Oral Nightly PRN   • sodium chloride 0.9% infusion   Intravenous Continuous   • acetaminophen (Tylenol Extra Strength) tab 500 mg  500 mg Oral Q4H PRN   • ondansetron (Zofran) 4 MG/2ML injection 4 mg  4 mg Intravenous Q6H PRN   • polyethylene glycol (PEG 3350) (Miralax) 17 g oral packet 17 g  17 g Oral Daily PRN   • sennosides (Senokot) tab 17.2 mg  17.2 mg Oral Nightly PRN   • bisacodyl (Dulcolax) 10 MG rectal suppository 10 mg  10 mg Rectal Daily PRN   • fleet enema (Fleet) 7-19 GM/118ML rectal enema 133 mL  1 enema Rectal Once PRN   • morphINE PF 2 MG/ML injection 1 mg  1 mg Intravenous Q2H PRN    Or   • morphINE PF 2 MG/ML injection 2 mg  2 mg Intravenous Q2H PRN    Or   • morphINE PF 4 MG/ML injection 4 mg  4 mg Intravenous Q2H PRN   • cephalexin (Keflex) cap 500 mg  500 mg Oral TID   • [] sodium chloride 0.9% infusion   Intravenous Continuous   • [] morphINE PF 4 MG/ML injection 4 mg  4 mg Intravenous Q30 Min PRN   • [] ondansetron (Zofran) 4 MG/2ML injection 4 mg  4 mg Intravenous Q4H PRN   • [COMPLETED] potassium chloride 40 mEq in 250mL sodium chloride 0.9% IVPB premix  40 mEq Intravenous Once   • [COMPLETED] morphINE PF 4 MG/ML injection 4 mg  4 mg Intravenous Once   • lidocaine-menthol 4-1 % patch 2 patch  2 patch Transdermal Daily   • [COMPLETED] HYDROcodone-acetaminophen (Norco) 5-325 MG per tab 1 tablet  1 tablet Oral Once       Outpatient Medications:     Medications Prior to Admission   Medication Sig Dispense  Refill Last Dose   • vancomycin 125 MG Oral Cap Take 1 capsule (125 mg total) by mouth every 6 (six) hours for 10 days, THEN 1 capsule (125 mg total) 2 (two) times a day for 15 days. 70 capsule 0 1/8/2024   • cephalexin 500 MG Oral Cap Take 1 capsule (500 mg total) by mouth 3 (three) times daily for 10 days. 30 capsule 0 1/8/2024   • fluticasone furoate-vilanterol 100-25 MCG/ACT Inhalation Aerosol Powder, Breath Activated Inhale 1 puff into the lungs daily. 1 each 2 1/8/2024   • baclofen 10 MG Oral Tab Take 1 tablet (10 mg total) by mouth 3 (three) times daily as needed.   1/8/2024   • cetirizine 5 MG Oral Tab Take 1 tablet (5 mg total) by mouth daily.   1/8/2024   • sucralfate (CARAFATE) 1 g Oral Tab Take 1 tablet (1 g total) by mouth 3 (three) times daily as needed. 60 tablet 3 1/8/2024   • benzonatate 100 MG Oral Cap Take 1 capsule (100 mg total) by mouth 3 (three) times daily as needed for cough. 30 capsule 0 1/8/2024   • Omeprazole 40 MG Oral Capsule Delayed Release Take 1 capsule (40 mg total) by mouth 2 (two) times daily before meals. 90 capsule 3 1/8/2024   • sertraline 100 MG Oral Tab Take 1 tablet (100 mg total) by mouth daily. 90 tablet 1 1/8/2024   • Pancrelipase, Lip-Prot-Amyl, (CREON) 17978-42194 units Oral Cap DR Particles Take 2 capsules with meals and 1 capsule with snacks 240 capsule 0 1/8/2024   • metoprolol succinate ER 50 MG Oral Tablet 24 Hr TAKE 1 TABLET BY MOUTH EVERY DAY 90 tablet 1 1/8/2024   • ipratropium-albuterol 0.5-2.5 (3) MG/3ML Inhalation Solution Take 3 mL by nebulization every 6 (six) hours as needed. 120 each 1    • HYDROcodone-acetaminophen 5-325 MG Oral Tab Take 1-2 tablets by mouth every 8 (eight) hours as needed for Pain. 10 tablet 0    • guaiFENesin-codeine 100-10 MG/5ML Oral Solution Take 5 mL by mouth every 6 (six) hours as needed for cough. 237 mL 1    • losartan 50 MG Oral Tab Take 1 tablet (50 mg total) by mouth daily. 90 tablet 2 Unknown   • ipratropium-albuterol  0.5-2.5 (3) MG/3ML Inhalation Solution Take 3 mL by nebulization every 8 (eight) hours. 168 mL 1    • sodium chloride, hypertonic, 3 % Inhalation Nebu Soln Take 3 mL by nebulization every 8 (eight) hours. 360 mL 1        Allergies: Patient has no known allergies.      Anesthesia Evaluation    Patient summary reviewed.    Anesthetic Complications           GI/Hepatic/Renal      (+) GERD                           Cardiovascular                  (+) hypertension     (+) CAD                                Endo/Other                                  Pulmonary               (+) shortness of breath            Neuro/Psych      (+) depression                              Past Surgical History:   Procedure Laterality Date   • APPENDECTOMY     • APPENDECTOMY     • ARTHROSCOPY OF JOINT UNLISTED      right shoulder   • CABG      On 8/16/13: CABG x 4 with LIMA to LAD and SVG to diagonal, OM and PDA   • CARDIAC CATH LAB      On 8/14/2013: cardiac cath showed 3-vessel disease   • OTHER SURGICAL HISTORY      1.       Laparoscopic robotic-assisted esophagogastrectomy.     Social History     Socioeconomic History   • Marital status:    • Number of children: 3   Occupational History   • Occupation: works as  - on workman's comp   Tobacco Use   • Smoking status: Former     Packs/day: 0.00     Years: 27.00     Additional pack years: 0.00     Total pack years: 0.00     Types: Cigarettes     Quit date: 8/15/2013     Years since quitting: 10.4   • Smokeless tobacco: Never   • Tobacco comments:     Quit smoking 2013   Vaping Use   • Vaping Use: Never used   Substance and Sexual Activity   • Alcohol use: Not Currently   • Drug use: Never   • Sexual activity: Not Currently     Partners: Female   Other Topics Concern   • Caffeine Concern Yes     Comment: 2 energy drinks, 6 mountain dews daily   • Exercise No     History   Drug Use Unknown     Available pre-op labs reviewed.  Lab Results   Component Value Date    WBC 8.2  01/12/2024    RBC 3.59 (L) 01/12/2024    HGB 10.6 (L) 01/12/2024    HCT 34.1 (L) 01/12/2024    MCV 95.0 01/12/2024    MCH 29.5 01/12/2024    MCHC 31.1 01/12/2024    RDW 14.2 01/12/2024    .0 01/12/2024     Lab Results   Component Value Date     01/12/2024    K 4.6 01/12/2024     01/12/2024    CO2 31.0 01/12/2024    BUN 9 01/12/2024    CREATSERUM 0.51 (L) 01/12/2024    GLU 87 01/12/2024    CA 9.0 01/12/2024            Airway      Mallampati: I  Mouth opening: >3 FB  TM distance: > 6 cm  Neck ROM: full Cardiovascular    Cardiovascular exam normal.  Rhythm: regular  Rate: normal     Dental             Pulmonary    Pulmonary exam normal.                 Other findings          ASA: 3   Plan: general  NPO status verified and patient meets guidelines.          Plan/risks discussed with: patient and significant other            Present on Admission:  • Chest pain  • Coronary artery disease involving native coronary artery of native heart without angina pectoris  • Subacute cough  • Acquired bronchoesophageal fistula (HCC)

## 2024-01-13 VITALS
HEIGHT: 74 IN | TEMPERATURE: 98 F | OXYGEN SATURATION: 97 % | SYSTOLIC BLOOD PRESSURE: 116 MMHG | BODY MASS INDEX: 23.1 KG/M2 | RESPIRATION RATE: 16 BRPM | WEIGHT: 180 LBS | DIASTOLIC BLOOD PRESSURE: 70 MMHG | HEART RATE: 59 BPM

## 2024-01-13 PROCEDURE — 99239 HOSP IP/OBS DSCHRG MGMT >30: CPT | Performed by: INTERNAL MEDICINE

## 2024-01-13 NOTE — DISCHARGE SUMMARY
[facility]     Dontae Cortez Jr. Patient Status:  Inpatient    10/15/1970 MRN [id number]   Location [facility] 4NW-A Attending Charles Maguire MD   Hosp Day # 5 PCP Adrian Horowitz MD     Date of Admission: 2024  Date of Discharge:   2024      Discharge Disposition: Home or Self Care    Discharge Diagnosis:  Dysphagia  Metastatic Esophageal Cancer  Bronchoesophageal fistula  CAD s/p CABG      History of Present Illness: Dontae Cortez Jr. is a 53 year old male with a PMH of recently diagnosed C. Diff, esophageal adenocarcinoma s/p esophagectomy with J-tube complicated by bronchoesophageal fistula s/p bronchial stent and esophageal stent with recent esophageal stent removal  presented to ED due to dysphagia and chest pain. Patient reports difficulty swallowing liquids. Liquid intake results in coughing/gagging. Patient wakes up at night with sensation that he is drowning due to saliva in throat. Patient also reports pain in chest that is worse with movement. Denies palpitations or diaphoresis.        Brief Synopsis: Pt was admitted and evaluated by GI and pulmonary. Pt underwent EGD and bronchoscpy during stay. He was also seen by ENT during stay but will follow up with them as outpatient for further laryngospams evaluation. Pt will follow up this week for ASD occluder and removal of bronchial stent.     Lace+ Score: 82  59-90 High Risk  29-58 Medium Risk  0-28   Low Risk       TCM Follow-Up Recommendation:  LACE > 58: High Risk of readmission after discharge from the hospital.      Procedures during hospitalization:   EGD, Bronchoscopy    Incidental or significant findings and recommendations (brief descriptions):  none    Lab/Test results pending at Discharge:   none    Consultants:  GI, Pulm, ENT    Discharge Medication List:     Discharge Medications        START taking these medications        Instructions Prescription details   lidocaine-menthol 4-1 %  [facility]      Place 2 patches onto the skin daily.   Quantity: 30 patch  Refills: 0            CONTINUE taking these medications        Instructions Prescription details   baclofen 10 MG Tabs  Commonly known as: Lioresal      Take 1 tablet (10 mg total) by mouth 3 (three) times daily as needed.   Refills: 0     benzonatate 100 MG Caps  Commonly known as: Tessalon      Take 1 capsule (100 mg total) by mouth 3 (three) times daily as needed for cough.   Quantity: 30 capsule  Refills: 0     cetirizine 5 MG Tabs  Commonly known as: ZyrTEC      Take 1 tablet (5 mg total) by mouth daily.   Refills: 0     Creon 09834-92334 units Cpep  Generic drug: Pancrelipase (Lip-Prot-Amyl)      Take 2 capsules with meals and 1 capsule with snacks   Quantity: 240 capsule  Refills: 0     fluticasone furoate-vilanterol 100-25 MCG/ACT Aepb  Commonly known as: Breo Ellipta      Inhale 1 puff into the lungs daily.   Quantity: 1 each  Refills: 2     HYDROcodone-acetaminophen 5-325 MG Tabs  Commonly known as: Norco      Take 1-2 tablets by mouth every 8 (eight) hours as needed for Pain.   Quantity: 10 tablet  Refills: 0     losartan 50 MG Tabs  Commonly known as: Cozaar      Take 1 tablet (50 mg total) by mouth daily.   Quantity: 90 tablet  Refills: 2     metoprolol succinate ER 50 MG Tb24  Commonly known as: Toprol XL      TAKE 1 TABLET BY MOUTH EVERY DAY   Quantity: 90 tablet  Refills: 1     Omeprazole 40 MG Cpdr      Take 1 capsule (40 mg total) by mouth 2 (two) times daily before meals.   Quantity: 90 capsule  Refills: 3     sertraline 100 MG Tabs  Commonly known as: Zoloft      Take 1 tablet (100 mg total) by mouth daily.   Quantity: 90 tablet  Refills: 1     sodium chloride (hypertonic) 3 % Nebu      Take 3 mL by nebulization every 8 (eight) hours.   Quantity: 360 mL  Refills: 1     sucralfate 1 g Tabs  Commonly known as: Carafate      Take 1 tablet (1 g total) by mouth 3 (three) times daily as needed.   Stop taking on: January 21,  2024  Quantity: 60 tablet  Refills: 3     vancomycin 125 MG Caps  Commonly known as: Vancocin  Start taking on: January 3, 2024      Take 1 capsule (125 mg total) by mouth every 6 (six) hours for 10 days, THEN 1 capsule (125 mg total) 2 (two) times a day for 15 days.   Stop taking on: January 28, 2024  Quantity: 70 capsule  Refills: 0            STOP taking these medications      guaiFENesin-codeine 100-10 MG/5ML Soln  Commonly known as: Robitussin AC        ipratropium-albuterol 0.5-2.5 (3) MG/3ML Soln  Commonly known as: Duoneb               ASK your doctor about these medications        Instructions Prescription details   cephalexin 500 MG Caps  Commonly known as: Keflex  Ask about: Should I take this medication?      Take 1 capsule (500 mg total) by mouth 3 (three) times daily for 10 days.   Stop taking on: January 13, 2024  Quantity: 30 capsule  Refills: 0               Where to Get Your Medications        These medications were sent to [facility]/pharmacy #[id number] - [place], [place] - [address] [facility] [phone], [phone]  [address] [place] [address]      Phone: [phone]   lidocaine-menthol 4-1 % Ptch         [place]    Follow-up appointment:   No follow-up provider specified.  Appointments for Next 30 Days 1/14/2024 - 2/13/2024        Date Arrival Time Visit Type Length Department Provider     1/15/2024  8:00 AM  CHEMOTHERAPY [2076] 60 min [facility] in [facility] [place] RN1    Patient Instructions:         Location Instructions:     Your appointment is scheduled at the [facility] in [place]. The address is [address] [place] [address]. Please park in the GREEN LOT and enter through the [facility] ENTRANCE of BUILDING A.. Once you arrive, please register at the [facility]  on the second floor.  Masks are optional for all patients and visitors, unless otherwise indicated. No care partners/visitors under 18 years of age are allowed  in the infusion room.               1/15/2024  8:45 AM  ON TREATMENT VISIT FOLLOW-UP [2641] 15 min [facility] in [place] Senia Foster MD    Patient Instructions:         Location Instructions:     **IF YOU NEED LABWORK OR AN INFUSION ALONG WITH YOUR APPOINTMENT, YOU MUST CALL TO SCHEDULE.**  Your appointment is scheduled at the [facility] in [place]. The address is [address] [place] [address]. Please park in the GREEN LOT and enter through the [facility] ENTRANCE of BUILDING A. Once you arrive, please register at the [facility]  on the second floor.  Masks are optional for all patients and visitors, unless otherwise indicated.               1/15/2024  9:00 AM  PF HEM/ONC-DIETARY F/U [4073] 30 min [facility] in [place] PF HEM/ONC EDILMA STONER    Patient Instructions:     Your appointment is scheduled at the [facility] in [place]. The address is [address] [place] [address]. Please park at the Green Lot and enter through building A. Once you arrive, please register at the [facility]  on the second floor.          Location Instructions:     **IF YOU NEED LABWORK OR AN INFUSION ALONG WITH YOUR APPOINTMENT, YOU MUST CALL TO SCHEDULE.**  Your appointment is scheduled at the [facility] in [place]. The address is [address] [place] [address]. Please park in the GREEN LOT and enter through the [facility]ENTER ENTRANCE of BUILDING A. Once you arrive, please register at the [facility]  on the second floor.  Masks are optional for all patients and visitors, unless otherwise indicated.                      Vital signs:             -----------------------------------------------------------------------------------------------  PATIENT DISCHARGE INSTRUCTIONS: See electronic chart    Charles Maguire MD    Total time spent on discharge plannin minutes     The  Cures Act makes  medical notes like these available to patients in the interest of transparency. Please be advised this is a medical document. Medical documents are intended to carry relevant information, facts as evident, and the clinical opinion of the practitioner. The medical note is intended as peer to peer communication and may appear blunt or direct. It is written in medical language and may contain abbreviations or verbiage that are unfamiliar.

## 2024-01-13 NOTE — PROGRESS NOTES
Gastroenterology Progress Note  Dontae Buddy Diego Champion Patient Status:  Inpatient    10/15/1970 MRN [id number]   [facility] located within [facility] 4NW-A Attending Charles Maguire MD   Hosp Day # 5 PCP Adrian Horowitz MD     Chief Complaint: Chronic cough, hx of bronchoesophageal fistula   S:  No acute events overnight. Pt with ongoing cough. No abd pain, no nausea.   O: /70 (BP Location: Left arm)   Pulse 59   Temp 97.9 °F (36.6 °C) (Oral)   Resp 16   Ht 6' 2\" (1.88 m)   Wt 180 lb (81.6 kg)   SpO2 97%   BMI 23.11 kg/m²   Gen: AAOx3  CV: Bradycardic, regular   Resp: Diminished bilaterally; No increase in respiratory effort   Abd: (+)BS, soft, non-tender, non-distended; no rebound or guarding  Ext: No edema or cyanosis  Skin: Warm and dry  Laboratory Data:     No results for input(s): \"PGLU\" in the last 168 hours.  No results for input(s): \"INR\" in the last 168 hours.      Recent Labs   Lab 24  1816 24  0539   WBC 13.1* 8.2   HGB 10.5* 10.6*   .0 273.0       Recent Labs   Lab 24  1816 24  0652 01/10/24  0541 24  0539    141  --  137   K 3.1* 3.2* 3.9 4.6    107  --  103   CO2 32.0 32.0  --  31.0   BUN 12 10  --  9   CREATSERUM 0.31* 0.37*  --  0.51*       Recent Labs   Lab 24   ALT 23   AST 21     Assessment: 53 yr-old male with hx of CAD s/p CABG, HTN, dyslipidemia, and distal esophageal adenocarcinoma (dx 2022) s/p chemoRT + s/p esophagectomy (2022; Dr Lu) with recovery c/b anastomotic leak s/p endoscopic closure with stenting and course further c/b GOO s/p EGD with esophageal stenting and Botox (2022). More recently pt noted to have pancreatic head mass (2023) with bx revealing poorly differentiated carcinoma. Over the last several months pt has been struggling with bronchoesophageal fistula which has been treated with esophageal stenting (since removed) and also bronchial stenting  (in-place; [facility]). Pt with recurrent cough and noted to have recurrent fistula in medial aspect of the distal right main stem despite normal esophogram.  EGD 1/12 with advanced endoscopy with  distal esophagectomy and gastric pull through, anastomosis healed ulcer scar and 2 mm fistula s/p attempt at clipping, but unsuccessful.     Plan:   Per advanced notes, will consider placement of ASD occluder (atrial septal defect) in combination with pulmonary service to remove bronchial stent at same time, potentially on Tuesday in conjunction with patient and family wishes  Continue PPI BID + Reglan TID AC and HS  CBC, BMP, Mg correcting electrolytes per Hospitalist recommendations   Meng Sierra MD, 01/13/24, 8:51 AM  [facility]  [phone]

## 2024-01-13 NOTE — PROGRESS NOTES
NURSING DISCHARGE NOTE    Discharged Home via Wheelchair.  Accompanied by Support staff  Belongings return to patient  Dc patient in stable condition ,left ann cath heparinized  And de accessed,Instructed on follow up appt.

## 2024-01-13 NOTE — PROGRESS NOTES
Sched meds given and tolerated well,Denies any discomfort.  Has fair appetite,Plan for dc today,Gi will call patient next week  To sched placement ASD,atrial septal defect and removal of bronchial stent.

## 2024-01-13 NOTE — PROGRESS NOTES
Patient is A/O x4, reports cramping mid upper abd pain especially when coughing. Morphine as needed with relief. VSS and afebrile. Needs addressed amnd call light within reach.

## 2024-01-13 NOTE — PROGRESS NOTES
Per patient Gi, Dr. Ritchie's group will call him at home on Tuesday ,Wed or Thursday to schedule appt for procedure of placement of atrial septal defect and removal of bronchial stent.

## 2024-01-13 NOTE — PROGRESS NOTES
[facility]     Hospitalist Progress Note     Dontae Buddy Cortez . Patient Status:  Inpatient    10/15/1970 MRN [id number]   Formerly [facility] 4NW-A Attending Jan Brito MD   Hosp Day # 5 PCP Adrian Horowitz MD     Chief Complaint: Dysphagia    Subjective:     Patient continues to have trouble swallowing. Pt had EGD yesterday and tolerated. Toelrating diet this morning. Plan for ASD occluder and removal and bronchial stent next week. Wants to go home.     Objective:    Review of Systems:   A comprehensive review of systems was completed; pertinent positive and negatives stated in subjective.    Vital signs:  Temp:  [97.9 °F (36.6 °C)-98.4 °F (36.9 °C)] 97.9 °F (36.6 °C)  Pulse:  [56-75] 59  Resp:  [12-20] 16  BP: (109-150)/(51-85) 116/70  SpO2:  [91 %-99 %] 97 %    Physical Exam:    General: No acute distress, pleasant   Respiratory: no wheezes, no rhonchi  Cardiovascular: S1, S2, regular rate and rhythm  Abdomen: Soft, Non-tender, non-distended, positive bowel sounds  Neuro: No new focal deficits.   Extremities: no edema    Diagnostic Data:    Labs:  Recent Labs   Lab 24  0539   WBC 13.1* 8.2   HGB 10.5* 10.6*   MCV 94.3 95.0   .0 273.0       Recent Labs   Lab 24  1816 24  0652 01/10/24  0541 24  0539   * 92  --  87   BUN 12 10  --  9   CREATSERUM 0.31* 0.37*  --  0.51*   CA 8.4* 7.9*  --  9.0   ALB 2.8*  --   --   --     141  --  137   K 3.1* 3.2* 3.9 4.6    107  --  103   CO2 32.0 32.0  --  31.0   ALKPHO 108  --   --   --    AST 21  --   --   --    ALT 23  --   --   --    BILT 0.3  --   --   --    TP 7.1  --   --   --        Estimated Creatinine Clearance: 191.1 mL/min (A) (based on SCr of 0.51 mg/dL (L)).    No results for input(s): \"TROP\", \"TROPHS\", \"CK\" in the last 168 hours.    No results for input(s): \"PTP\", \"INR\" in the last 168 hours.               Microbiology    Hospital Encounter on 24   1. Blood Culture     Status:  None (Preliminary result)    Collection Time: 01/08/24  7:37 PM    Specimen: Blood,peripheral   Result Value Ref Range    Blood Culture Result No Growth 4 Days N/A         Imaging: Reviewed in Epic.    Medications:    pantoprazole  40 mg Oral BID AC    enoxaparin  40 mg Subcutaneous Nightly    sodium chloride (hypertonic)  3 mL Nebulization TID    albuterol  2.5 mg Nebulization TID    metoclopramide  10 mg Oral TID AC and HS (2200)    Or    metoclopramide  10 mg Intravenous TID AC and HS (2200)    cetirizine  5 mg Oral Daily    fluticasone furoate-vilanterol  1 puff Inhalation Daily    losartan  50 mg Oral Daily    metoprolol succinate ER  50 mg Oral Daily Beta Blocker    lipase-protease-amylase (Lip-Prot-Amyl)  25,000 Units Oral TID CC    sertraline  100 mg Oral Daily    vancomycin  125 mg Oral Q6H    cephalexin  500 mg Oral TID    lidocaine-menthol  2 patch Transdermal Daily       Assessment & Plan:      #Dysphagia  Patient with complex GI history  Barium espophagram reviewed  GI, Pulm following  ENT to eval for possibel laryngospasm, will need continued OP followup  Continue PPI BID  Reglan QID  S/p bronchscopy 1/11,   S/p EGD 1/12  Plan for ASD occluder and removal of bronchial stent next week     #Chest pain  Likely musculoskeletal due to coughing  Improved with lidocaine patch      #Recent staph pneumonia  Continue antibiotics started PTA  Pulm following  Continue nebs  Flutter valve     #Metastatic esophageal cancer s/p gastroesophagectomy 9/22, metastatic pancreatic cancer   -Follows with Dr. Foster     #H/o bronchoesophageal fistula  -Esophageal stent removed 12/12,   -H/o bronchial stent - follows with Dr. Madrid, plan to removal next wek     #C. Diff diagnosed PTA  -Continue PO vanco if tolerating      #CAD with prior CABG  -Continue home meds if tolerating      #GERD  -PPI      Charles Maguire MD    Supplementary Documentation:     Quality:  DVT Mechanical Prophylaxis:     Early ambuation  DVT  Pharmacologic Prophylaxis   Medication    enoxaparin (Lovenox) 40 MG/0.4ML SUBQ injection 40 mg    heparin (Porcine) 100 Units/mL lock flush 500 Units                Code Status: Full Code  Neumann: No urinary catheter in place  Neumann Duration (in days):   Central line:    SHUBHAM:     Discharge is dependent on: progrss  At this point Mr. Cortez is expected to be discharge to: Home    The 21st Century Cures Act makes medical notes like these available to patients in the interest of transparency. Please be advised this is a medical document. Medical documents are intended to carry relevant information, facts as evident, and the clinical opinion of the practitioner. The medical note is intended as peer to peer communication and may appear blunt or direct. It is written in medical language and may contain abbreviations or verbiage that are unfamiliar.

## 2024-01-15 ENCOUNTER — OFFICE VISIT (OUTPATIENT)
Dept: HEMATOLOGY/ONCOLOGY | Age: 54
End: 2024-01-15
Attending: INTERNAL MEDICINE
Payer: COMMERCIAL

## 2024-01-15 ENCOUNTER — TELEPHONE (OUTPATIENT)
Dept: FAMILY MEDICINE CLINIC | Facility: CLINIC | Age: 54
End: 2024-01-15

## 2024-01-15 ENCOUNTER — PATIENT OUTREACH (OUTPATIENT)
Dept: CASE MANAGEMENT | Age: 54
End: 2024-01-15

## 2024-01-15 VITALS
BODY MASS INDEX: 21.94 KG/M2 | DIASTOLIC BLOOD PRESSURE: 78 MMHG | SYSTOLIC BLOOD PRESSURE: 152 MMHG | RESPIRATION RATE: 18 BRPM | TEMPERATURE: 96 F | WEIGHT: 171 LBS | HEART RATE: 57 BPM | OXYGEN SATURATION: 97 % | HEIGHT: 73.82 IN

## 2024-01-15 DIAGNOSIS — C15.9 MALIGNANT NEOPLASM OF ESOPHAGUS, UNSPECIFIED LOCATION (HCC): ICD-10-CM

## 2024-01-15 DIAGNOSIS — R13.10 DYSPHAGIA, UNSPECIFIED TYPE: Primary | ICD-10-CM

## 2024-01-15 DIAGNOSIS — C15.5 MALIGNANT NEOPLASM OF LOWER THIRD OF ESOPHAGUS (HCC): Primary | ICD-10-CM

## 2024-01-15 LAB
ALBUMIN SERPL-MCNC: 3.1 G/DL (ref 3.4–5)
ALBUMIN/GLOB SERPL: 0.6 {RATIO} (ref 1–2)
ALP LIVER SERPL-CCNC: 118 U/L
ALT SERPL-CCNC: 31 U/L
ANION GAP SERPL CALC-SCNC: 5 MMOL/L (ref 0–18)
AST SERPL-CCNC: 27 U/L (ref 15–37)
BASOPHILS # BLD AUTO: 0.07 X10(3) UL (ref 0–0.2)
BASOPHILS NFR BLD AUTO: 0.7 %
BILIRUB SERPL-MCNC: 0.4 MG/DL (ref 0.1–2)
BUN BLD-MCNC: 21 MG/DL (ref 9–23)
CALCIUM BLD-MCNC: 9.3 MG/DL (ref 8.5–10.1)
CANCER AG19-9 SERPL-ACNC: 371.1 U/ML (ref ?–37)
CHLORIDE SERPL-SCNC: 105 MMOL/L (ref 98–112)
CO2 SERPL-SCNC: 29 MMOL/L (ref 21–32)
CREAT BLD-MCNC: 0.74 MG/DL
EGFRCR SERPLBLD CKD-EPI 2021: 108 ML/MIN/1.73M2 (ref 60–?)
EOSINOPHIL # BLD AUTO: 0.44 X10(3) UL (ref 0–0.7)
EOSINOPHIL NFR BLD AUTO: 4.6 %
ERYTHROCYTE [DISTWIDTH] IN BLOOD BY AUTOMATED COUNT: 14.3 %
GLOBULIN PLAS-MCNC: 4.8 G/DL (ref 2.8–4.4)
GLUCOSE BLD-MCNC: 109 MG/DL (ref 70–99)
HCT VFR BLD AUTO: 37.2 %
HGB BLD-MCNC: 11.7 G/DL
IMM GRANULOCYTES # BLD AUTO: 0.09 X10(3) UL (ref 0–1)
IMM GRANULOCYTES NFR BLD: 0.9 %
LYMPHOCYTES # BLD AUTO: 1.03 X10(3) UL (ref 1–4)
LYMPHOCYTES NFR BLD AUTO: 10.8 %
MCH RBC QN AUTO: 30.1 PG (ref 26–34)
MCHC RBC AUTO-ENTMCNC: 31.5 G/DL (ref 31–37)
MCV RBC AUTO: 95.6 FL
MONOCYTES # BLD AUTO: 0.93 X10(3) UL (ref 0.1–1)
MONOCYTES NFR BLD AUTO: 9.8 %
NEUTROPHILS # BLD AUTO: 6.97 X10 (3) UL (ref 1.5–7.7)
NEUTROPHILS # BLD AUTO: 6.97 X10(3) UL (ref 1.5–7.7)
NEUTROPHILS NFR BLD AUTO: 73.2 %
OSMOLALITY SERPL CALC.SUM OF ELEC: 292 MOSM/KG (ref 275–295)
PLATELET # BLD AUTO: 272 10(3)UL (ref 150–450)
POTASSIUM SERPL-SCNC: 4.2 MMOL/L (ref 3.5–5.1)
PROT SERPL-MCNC: 7.9 G/DL (ref 6.4–8.2)
RBC # BLD AUTO: 3.89 X10(6)UL
SODIUM SERPL-SCNC: 139 MMOL/L (ref 136–145)
WBC # BLD AUTO: 9.5 X10(3) UL (ref 4–11)

## 2024-01-15 PROCEDURE — 36591 DRAW BLOOD OFF VENOUS DEVICE: CPT

## 2024-01-15 PROCEDURE — 86301 IMMUNOASSAY TUMOR CA 19-9: CPT

## 2024-01-15 PROCEDURE — 80053 COMPREHEN METABOLIC PANEL: CPT

## 2024-01-15 PROCEDURE — 85025 COMPLETE CBC W/AUTO DIFF WBC: CPT

## 2024-01-15 PROCEDURE — 99215 OFFICE O/P EST HI 40 MIN: CPT | Performed by: INTERNAL MEDICINE

## 2024-01-15 RX ORDER — VANCOMYCIN HYDROCHLORIDE 125 MG/1
125 CAPSULE ORAL 2 TIMES DAILY
COMMUNITY
End: 2024-01-28

## 2024-01-15 RX ORDER — BACLOFEN 10 MG/1
10 TABLET ORAL 3 TIMES DAILY PRN
Qty: 90 TABLET | Refills: 0 | Status: SHIPPED | OUTPATIENT
Start: 2024-01-15

## 2024-01-15 RX ORDER — HYDROCODONE BITARTRATE AND ACETAMINOPHEN 5; 325 MG/1; MG/1
1-2 TABLET ORAL EVERY 8 HOURS PRN
Qty: 10 TABLET | Refills: 0 | Status: SHIPPED | OUTPATIENT
Start: 2024-01-15

## 2024-01-15 NOTE — PROGRESS NOTES
Nutrition F/U Consultation as per chart review     Patient Name: Dontae Cortez Jr.  YOB: 1970  Medical Record Number: [id number]      Account Number: [id number]  Dietitian: Kayla Hagen RD, LDN     Date of review: 1/15/24     Diet Rx: po as tolerated (post-esophagectomy); soft/moist as per esophageal stent placement (     Pertinent Dx/PMH: Esophageal cancer; pancreatic (head) cancer     TX:  to restart FOLFOX; switched to Herceptin/Immunotherapy maintenance on 9/25/23 due to negative signatera; q2 week 5-FU + Herceptin + q6 week Keytruda. Oxaliplatin was held on C7 due to neuropathy      1. Neoadjuvant chemoRT with carbo-taxol: 7/6/22-8/10/22  2. laparoscopic robotic-assisted esophagogastrectomy with feeding jejunostomy tube placement on 9/30/2022. Complications w/ esophageal leak  3. G-POEM (EGD and pylorectomy) 12/27/22     Other pertinent subjective/objective information: admitted from 1/8/24-1/13/24 for persistent dysphagia and bronchesophageal stent. Plan is for an ASD occluder and removal of the bronchial stent as an outpatient     1/15/24: Pt meeting definition for SEVERE MALNUTRITION in the context of acute illness as evidenced by 7% unplanned wt loss x 4 weeks and po intake meeting less than 50% estimated nutrition needs.      Pertinent Meds:   Pancrelipase, Lip-Prot-Amyl, (CREON) 02182-45328 units Oral Cap DR Particles 240 capsule 0 10/30/2023     Sig:   Take 2 capsules with meals and 1 capsule with snacks       Route:   (none)       Order #:   [id number]             Pertinent Labs: noted     Height: 6'2\"                IBW: 190 +/- 10%     WT HX:   01/15/24: 171 lbs  12/04/23: 182 lbs  11/06/23: 200 lbs  10/09/23: 206 lbs 8 oz  09/25/23: 206 lbs 8 oz  08/28/23: 219 lbs  08/07/23: 222 lbs  07/24/23: 226 lbs 8 oz     Estimated Nutrition Needs:   kcals/kg = 6587-9706 KCALS/d; 1.5 gms protein/kg = 124 gms/d     Assessment/Plan: RD planned to f/u w/ pt today; however, due to recent  hospitalization and scheduled procedure, tx held today. RD will plan to f/u next week.      The 21st Century Cures Act makes medical notes like these available to patients in the interest of transparency. Please be advised this is a medical document. Medical documents are intended to carry relevant information, facts as evident, and the clinical opinion of the practitioner. The medical note is intended as peer to peer communication and may appear blunt or direct. It is written in medical language and may contain abbreviations or verbiage that are unfamiliar.

## 2024-01-15 NOTE — PROGRESS NOTES
Patient here for follow-up and planned C14D1 treatment. Still having dysphagia and weakness. States he has  generalized pain and still has a cough with chills. Will have surgery with GI this Wednesday.

## 2024-01-15 NOTE — TELEPHONE ENCOUNTER
Per Dr. Horowitz, Pt can come in at noon on 1/16.  Spoke with pt, he agrees to noon appt on 1/16. Appt scheduled.

## 2024-01-15 NOTE — PAYOR COMM NOTE
Discharge Notification    Patient Name: CASSY RODRIGUEZ JR.UIE STEPHANIAMARLA  Payor: [employer]  Subscriber #: [id number]  Authorization Number: [id number]  Admit Date/Time: 1/8/2024 6:01 PM  Discharge Date/Time: 1/13/2024 12:05 PM

## 2024-01-15 NOTE — PROGRESS NOTES
[facility] Note    Diagnosis:   Metastatic Esophageal Cancer. Possible 2nd pancreatic primary?  -Initially cT3 N1 Esophageal Adenocarcinoma (stage IIIB)  -HER2 amplified by FISH    Treatment History:   1. Neoadjuvant chemoRT with carbo-taxol: 7/6/22-8/10/22    2. laparoscopic robotic-assisted esophagogastrectomy with feeding jejunostomy tube placement on 9/30/2022.     3. Adjuvant Opdivo: 1/9/23-2/20/23    ---Metastatic disease developed-----    5. Chemo-Herceptin-Immunotherapy: 5/1/23-09/2023 --> Switched to Herceptin/Immunotherapy maintenance on 9/25/23 due to negative signatera     Visit Diagnosis:  1. Malignant neoplasm of esophagus, unspecified location (HCC)        History of Present Illness: 52M with a PMH of CAD status post CABG, HLD and HTN is here for follow up for metastatic esophageal adenocarcinoma, HER2+. He has either a 2nd pancreatic primary vs. Metastatic esophageal to the pancreas. He is now on q3 week maintenance Keytruda and Herceptin.     Oncology History   2018: Per report had a normal EGD and colonoscopy    June 2022: He met with GI due to new onset dysphagia which started about 1 month prior to his visit.  He had an esophagram with a focal abrupt narrowing with irregular margins in the distal one third of the esophagus extending to the GE junction.  He also had an episode of food impaction. He has also lost about 15 lb. He was a heavy smoker from age 14 to about 40 (1.5 PPD). Also with alcohol use currently. Mother with a history of breast and colon cancer.     EGD and EUS with Dr. Yadav on 6/7/2022: Severe esophagitis with a concerning mass extending 37-39 centimeters from the incisors.  Nonobstructive mass.  By EUS uT3N1 disease.  Pathology showed invasive moderate to poorly differentiated adenocarcinoma.  HER2 amplified by FISH    CT CAP done at [facility] on 6/16/22: Results not loaded to PACs yet.  CA 19-9 from the same day was 107.4.  CEA  normal    PET/CT on 6/20/2022: Increased distal esophageal uptake with an SUV of 14.4.  Concern for shreya metastases.    Concurrent chemoradiation with carboplatin AUC2 plus paclitaxel 50 mg/m2  C1D1: 7/6/22: weight 280 :  137  C1D8: 7/13/22: weight 277  C1D15: 7/20/22: weight 278.   C1D22: 7/27/22  103. Weight 278   C1D29: 8/3/22:  49. Weight 275  C1D36: 8/10/22:  30.9. Weight 276    PET/CT on 9/6/22: CONCLUSION:  Improvement.  Abnormal elevated activity in the distal esophagus, but uptake has decreased compared to the prior.  Furthermore, there is no longer uptake identified within 2 upper abdominal lymph nodes, 1 has disappeared from view, another has decreased in size.     Surgery with Dr. Lu on 9/30/22: ypT1b pN0. Recovery has been complicated by an esophageal leak,     I met with him on 12/12/22. We discussed adjuvant opdivo for 1 year. His  was elevated to 48 and repeat was 64. CT CAP was recommended.     He was admitted on 12/25/22 for abdominal pain. He had a POEM procedure done. He was also noted to have a RLL consolidation. He was seen by pulmonary, based on imaging an outpatient PET/CT was recommended and a biopsy of the most FDG avid lesion would be considered. Noted to have CAD and an outpatient evaluation was recommended.   CTA Chest/A/P from 12/24/22: 1. No evidence of pulmonary embolism. 2. There has been interval esophagectomy with gastric pull-through.  No postoperative complications along the operative site noted. 3. The prior described focus of atelectasis along the right lung base is noted seen along the medial segment of the right middle lobe and has a consolidative type appearance, similar in configuration to the previous study and measuring 6.2 x 4.6 x 3.6 cm.  The appearance is concerning for potential metastasis.  Further assessment with PET/CT is recommended. 4. Diverticulosis of the sigmoid colon is again noted.  There has been interval placement of a  short segment stent within the mid sigmoid colon as described above.  Please correlate with patient's history.   PET/CT on 1/4/23: 1. Abnormal uptake corresponding to consolidation at the right lung base.  This is likely inflammatory/infectious in nature.  The patient had a prior right hydropneumothorax requiring small bore chest tube placement.   2. Abnormal uptake corresponding to a small soft tissue focus in anterior abdominal wall.  This may be related to prior surgery.  Attention on follow-up imaging recommended. 3. No convincing metastatic findings in the chest, abdomen or pelvis. 4. Details as above  His case was reviewed in GI TB. No focal areas of concern. Given stable imaging, recommend proceed with immunotherapy and repeat imaging after 4-6 cycles.     Adjuvant Opdivo: 1/19/2023  C1: 1/9/23:  198  C2: 1/23/23:  212  C3: 2/6/23:  284  C4: 2/20/23:  357    Tumor Board Review of Most recent pathology. Appears to be granulomatous disease. Malignancy very low on the differential. He should follow up with pulmonary medicine and infectious disease. We will continue routine surveillance imaging for his esophageal cancer. We will continue with immunotherapy as planned.     CT CAP on 2/28/23: 1. Stable postsurgical changes status post esophagectomy and gastric pull-through. 2. Nodules along the left major fissure appear mildly more prominent compared to the prior examination.  Continued attention on follow-up is recommended.  Additional right middle lobe nodule appears stable. 3. Bandlike opacity in the region of the right middle lobe appears improved compared to the prior exam and may represent postsurgical change versus atelectasis although neoplasm cannot be completely excluded.  Continued attention on follow-up is recommended.  A repeat PET-CT may be obtained to document stability. 4. No evidence of metastatic disease in the abdomen and pelvis.    C5: 3/6/23    PET/CT on 4/10/23: 1. Foci  of hypermetabolism of the pancreatic head and tail segments with SUV max of 7.2 and 5.1, respectively, highly suspicious for metastases. 2. Subtle hypermetabolic focus of the posterior segment right hepatic lobe (SUV max 3.8), equivocal for variable physiologic uptake versus metastasis.  Liver MRI may be helpful for further characterization.   3. Suspected benign inflammatory uptake within the thorax, as above.     EGD/EUS on 4/27/23 with Dr. Ritchie: Pancreatic head mass positive for adenocarcinoma--Comparison to previous esophageal cancer shows similarity but IHC in non-specific. Her 2 IHC was 2+ on this specimen but FISH was negative.     Chemo + Herceptin + Immunotherapy  C1: 5/1/23: FOLFOX-Herceptin-Opdivo  C2: 5/15/23:  1454: FOLFOX-Herceptin-Opdivo  C3: 6/5/23: Switched to Xeloda, Oxaliplatin, Herceptin, Keytruda:  329  C4: 7/10/23: Delayed to the thrombocytopenia:  79 to 35 Will switch back to 5-FU  C5: 7/24/23:  21.5    PET/CT on 7/27/23: 1. Previously described hypermetabolic lesions in the pancreas and in the liver are no longer identified on this study and could represent resolved or treated metastases. 2. Mild activity near the proximal anastomosis at the gastric pull-through is not significantly changed. 3. There are no new suspicious findings     C6: 8/7/23:  15.1  C7: 8/28/23: Ca19-9 WE are holding Oxaliplatin d/t neuropathy.  13.9  --5FU + Herceptin + Immunotherapy alone--  C8: 9/11/23:  12.3 Signatera negative    Herceptin and Keytruda maintenance q3 weeks since Signatera was negative   C1: 9/25/23:  10.3  C2: 10/9/23: Ca19-9 15. 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 Diffuse ground-glass opacity in the left lung, which has intervally increased in comparison to 12/30/2023.  There is new FDG avid mediastinal lymphadenopathy, which may be reactive versus malignant.  Continued attention on follow-up is recommended.  Continued follow-up to resolution of the ground-glass opacities recommended. 2. New FDG avid hepatic and pancreatic lesions.  The uptake near the pancreatic uncinate process is similar to 4/10/2023, which had resolved on 7/27/2023 exam.  This is suspicious for recurrence of disease. Given these findings, we discussed restarting FOLFOX.     -admitted from 1/8/24-1/13/24 for persistent dysphagia and bronchesophageal stent. Plan is for an ASD occluder and removal of the bronchial stent as an outpatient.     Interval History: 1/15/24  -PET/CT reviewed  -recent hospitalization reviewed. Plan for surgery this Wednesday. Doing ok overall, mainly weakness.     [facility] of Systems: 12 Point ROS was completed and pertinent positives are in the HPI    Current Outpatient Medications on File Prior to Visit   Medication Sig Dispense Refill    fluticasone furoate-vilanterol 100-25 MCG/ACT Inhalation Aerosol Powder, Breath Activated Inhale 1 puff into the lungs daily. 1 each 2    baclofen 10 MG Oral Tab Take 1 tablet (10 mg total) by mouth 3 (three) times daily as needed.      HYDROcodone-acetaminophen 5-325 MG Oral Tab Take 1-2 tablets by mouth every 8 (eight) hours as needed for Pain. 10 tablet 0    losartan 50 MG Oral Tab Take 1 tablet (50 mg total) by mouth daily. 90 tablet 2    benzonatate 100 MG Oral Cap Take 1 capsule (100 mg total) by mouth 3 (three) times daily as needed for cough. 30 capsule 0    sodium chloride, hypertonic, 3 % Inhalation Nebu Soln Take 3 mL by nebulization every 8 (eight) hours. 360 mL 1    Omeprazole 40 MG Oral Capsule Delayed Release Take 1 capsule (40 mg total) by mouth 2 (two) times daily before meals. 90 capsule 3     sertraline 100 MG Oral Tab Take 1 tablet (100 mg total) by mouth daily. 90 tablet 1    Pancrelipase, Lip-Prot-Amyl, (CREON) 67264-74143 units Oral Cap DR Particles Take 2 capsules with meals and 1 capsule with snacks 240 capsule 0    metoprolol succinate ER 50 MG Oral Tablet 24 Hr TAKE 1 TABLET BY MOUTH EVERY DAY 90 tablet 1    cetirizine 5 MG Oral Tab Take 1 tablet (5 mg total) by mouth daily. (Patient not taking: Reported on 1/15/2024)      sucralfate (CARAFATE) 1 g Oral Tab Take 1 tablet (1 g total) by mouth 3 (three) times daily as needed. (Patient not taking: Reported on 1/15/2024) 60 tablet 3     Current Facility-Administered Medications on File Prior to Visit   Medication Dose Route Frequency Provider Last Rate Last Admin    [COMPLETED] heparin (Porcine) 100 Units/mL lock flush 500 Units  5 mL Intracatheter Once Charles Maguire MD   500 Units at 24 1104    [COMPLETED] HYDROmorphone (Dilaudid) 1 MG/ML injection             [] naloxone (Narcan) 0.4 MG/ML injection 0.08 mg  0.08 mg Intravenous Once PRN Junaid Evangelista DO        [COMPLETED] potassium chloride (K-Dur) tab 40 mEq  40 mEq Oral Once Jan Brito MD   40 mEq at 24 0833    [COMPLETED] morphINE PF 4 MG/ML injection 4 mg  4 mg Intravenous Once Jose E Chung MD   4 mg at 24    [] sodium chloride 0.9% infusion   Intravenous Continuous Jose E Chung MD   New Bag at 24 1002    [] morphINE PF 4 MG/ML injection 4 mg  4 mg Intravenous Q30 Min PRN Jose E Chung MD        [] ondansetron (Zofran) 4 MG/2ML injection 4 mg  4 mg Intravenous Q4H PRN Jose E Chung MD        [COMPLETED] potassium chloride 40 mEq in 250mL sodium chloride 0.9% IVPB premix  40 mEq Intravenous Once Jose E Chung MD 62.5 mL/hr at 24 40 mEq at 24    [COMPLETED] morphINE PF 4 MG/ML injection 4 mg  4 mg Intravenous Once Jose E Chung MD   4 mg at 244    [COMPLETED] HYDROcodone-acetaminophen  (Norco) 5-325 MG per tab 1 tablet  1 tablet Oral Once Jose E Chung MD   1 tablet at 24 2253    [COMPLETED] heparin (Porcine) 100 Units/mL lock flush 500 Units  5 mL Intracatheter Once Senia Foster MD   500 Units at 24 1123    [COMPLETED] potassium chloride 40 mEq in 250mL sodium chloride 0.9% IVPB premix  40 mEq Intravenous Once Megan Arshad MD 62.5 mL/hr at 24 1436 40 mEq at 24 1436    [COMPLETED] iopamidol 76% (ISOVUE-370) injection for power injector  80 mL Intravenous ONCE PRN Sophia Gibson MD   80 mL at 23 0248    [COMPLETED] potassium chloride 40 mEq in 250mL sodium chloride 0.9% IVPB premix  40 mEq Intravenous Once Jose Roberto Myles DO 62.5 mL/hr at 23 1328 40 mEq at 23 1328    [COMPLETED] ipratropium-albuterol (Duoneb) 0.5-2.5 (3) MG/3ML inhalation solution 3 mL  3 mL Nebulization Once Konrad Cuba MD   3 mL at 23 1617    [COMPLETED] ketorolac (Toradol) 15 MG/ML injection 15 mg  15 mg Intravenous Once Konrad Cuba MD   15 mg at 23 1649    [] ondansetron (Zofran) 4 MG/2ML injection 4 mg  4 mg Intravenous Q4H PRN Konrad Cuba MD        [COMPLETED] piperacillin-tazobactam (Zosyn) 3.375 g in dextrose 5% 100 mL IVPB-ADDV  3.375 g Intravenous Once Konrad Cuba MD   Stopped at 23 1759    [COMPLETED] ketorolac (Toradol) 15 MG/ML injection 15 mg  15 mg Intravenous Once Konrad Cuba MD   15 mg at 23 1814    [COMPLETED] morphINE PF 4 MG/ML injection 4 mg  4 mg Intravenous Once Konrad Cuba MD   4 mg at 23 195    [COMPLETED] morphINE PF 4 MG/ML injection 4 mg  4 mg Intravenous Once Konrad Cuba MD   4 mg at 23 0583    [COMPLETED] trastuzumab-qyyp (Trazimera) 504 mg in sodium chloride 0.9% 274 mL infusion  6 mg/kg (Treatment Plan Recorded) Intravenous Once Nataly Siddiqui APRN   Stopped at 23 0930    [COMPLETED] sodium chloride  0.9 % IV bolus 500 mL  500 mL Intravenous Once Freddy Hernandez MD   Stopped at 12/16/23 2055    [COMPLETED] ipratropium-albuterol (Duoneb) 0.5-2.5 (3) MG/3ML inhalation solution 3 mL  3 mL Nebulization Once Freddy Hernandez MD   3 mL at 12/16/23 1950    [COMPLETED] HYDROmorphone (Dilaudid) 1 MG/ML injection 0.5 mg  0.5 mg Intravenous Q30 Min PRN Freddy Hernandez MD   0.5 mg at 12/16/23 2239    [COMPLETED] iohexol (Omnipaque) 350 MG/ML injection via power injector 100 mL  100 mL Intravenous ONCE PRN Freddy Hernandez MD   100 mL at 12/16/23 2142    [COMPLETED] morphINE PF 4 MG/ML injection 4 mg  4 mg Intravenous Q30 Min PRN Wero Chiang MD   4 mg at 12/19/23 0813     Past Medical History:   Diagnosis Date    Back problem     Belching 02/01/2022    Black stools 02/01/2022    Borderline diabetes     Dx in 8/2013 - HgA1C 6.2%    C. difficile diarrhea 10/23/2022    pt treated and without symptoms    Chest pain 02/01/2022    Coronary artery disease     On 8/16/13: CABG x 4 with LIMA to LAD and SVG to diagonal, OM and PDA    Decorative tattoo 01/01/2000    Depression     Esophageal cancer (HCC)     completed chemo    Esophageal reflux     Essential hypertension     Exposure to medical diagnostic radiation     last tx 8/18/2022    Frequent urination 01/01/2013    Gastroparesis     Heartburn 02/01/2022    High blood pressure     High cholesterol 08/01/2013    Found when I had quadruple bypass    History of COVID-19 10/16/2022    asymptomatic - pt was dx during a hospitalization for another diagnosis. No continued symptoms    History of stomach ulcers     Hyperlipidemia     Hyperlipidemia LDL goal < 70     Indigestion 02/01/2022    Morbid obesity with BMI of 40.0-44.9, adult (HCC)     Muscle weakness     Nontoxic multinodular goiter     Dx in 8/2013: pt was told that imaging showed thyroid cysts per PCP    Pancreatic cancer (HCC)     last dose 12/4/2023 is scheduled for another round 12/27/23    Peripheral vascular  disease (HCC)     pt denies    Personal history of antineoplastic chemotherapy     for esophageal cancer/completed    Personal history of antineoplastic chemotherapy     pancreatic cancer    Problems with swallowing 02/01/2022    Pulmonary nodules     Dx in 8/2013: CT chest showed small bilateral fissural-based lung nodules less than 1 cm    S/P CABG x 4     On 8/16/13: CABG x 4 with LIMA to LAD and SVG to diagonal, OM and PDA    Shortness of breath     Vomiting 02/01/2022     Past Surgical History:   Procedure Laterality Date    APPENDECTOMY      APPENDECTOMY      ARTHROSCOPY OF JOINT UNLISTED      right shoulder    CABG      On 8/16/13: CABG x 4 with LIMA to LAD and SVG to diagonal, OM and PDA    CARDIAC CATH LAB      On 8/14/2013: cardiac cath showed 3-vessel disease    OTHER SURGICAL HISTORY      1.       Laparoscopic robotic-assisted esophagogastrectomy.     Social History     Socioeconomic History    Marital status:     Number of children: 3   Occupational History    Occupation: works as  - on workman's comp   Tobacco Use    Smoking status: Former     Packs/day: 0.00     Years: 27.00     Additional pack years: 0.00     Total pack years: 0.00     Types: Cigarettes     Quit date: 8/15/2013     Years since quitting: 10.4    Smokeless tobacco: Never    Tobacco comments:     Quit smoking 2013   Vaping Use    Vaping Use: Never used   Substance and Sexual Activity    Alcohol use: Not Currently    Drug use: Never    Sexual activity: Not Currently     Partners: Female      Family History   Problem Relation Age of Onset    Cancer Mother         breast and colon     Diabetes Neg        Physical Exam  Height: 187.5 cm (6' 1.82\") (01/15 0818)  Weight: 77.6 kg (171 lb) (01/15 0818)  BSA (Calculated - sq m): 2.03 sq meters (01/15 0818)  Pulse: 57 (01/15 0818)  BP: 152/78 (01/15 0818)  Temp: 96.3 °F (35.7 °C) (01/15 0818)  Do Not Use - Resp Rate: --  SpO2: 97 % (01/15 0818)     General: NAD, AOX3  HEENT:  clear op, mmm, no jvd, no scleral icterus  CV: RRR  Extremities: No edema   Lungs:  no increased work of breathing  Abd: soft nt nd +BS no hepatosplenomegaly  Neuro: CN: II-XII grossly intact    Results:  Lab Results   Component Value Date    WBC 9.5 01/15/2024    HGB 11.7 (L) 01/15/2024    HCT 37.2 (L) 01/15/2024    MCV 95.6 01/15/2024    .0 01/15/2024    EOSABS 0.19 12/19/2022     Lab Results   Component Value Date     01/12/2024    K 4.6 01/12/2024    CO2 31.0 01/12/2024     01/12/2024    BUN 9 01/12/2024    PHOS 3.5 11/27/2023    ALB 2.8 (L) 01/08/2024       Lab Results   Component Value Date     12/19/2023       Radiology: reviewed     Pathology: reviewed   CT Guided Lung Biopsy 1/26/23  The case is that of a 52-year-old man with a history of stage IIIB esophageal adenocarcinoma who is status-post laparoscopic robotic-assisted esophagogastrectomy and neoadjuvant carbo-Taxol (completed in August of 2022). More recently, he presented with chest pain and bilateral upper quadrant abdominal pain, with some associated nausea and hematemesis which prompted a chest CT. This imaging study revealed a triangular consolidation in the middle lobe of his right lung measuring 6.2 cm in maximal dimension. Given the concern for an underlying metastatic process, the lesion was sampled.     Tissue sections of the needle core biopsy specimen exhibit a diffuse proliferation of bland spindle cells with associated chronic inflammation. Overall, the proliferation has a vaguely granulomatous quality to it. lmmunohistochemica\ studies, were performed by the referring institution and showed only scattered reactivity with antibodies directed against SMA. Significant reactivity was not observed with antibodies directed against ALK, CD34, STAT6, S100, CDX2, SOX10, or desmin. Rare cells were positive with Carlito keratin and AE1/AE3.  and CD45 highlighted scattered cells throughout. Additional immunohistochemica\  stains were performed at [facility], and are detailed above.     The nature of this proliferation is not immediately clear. Certainly a granulomatous process related to underlying infection, drug reactions or an autoimmune phenomenon are possible. The negative special stains for microorganisms reduced, but do not eliminate the possibility of an infectious etiology. A histiocytic neoplasm was also considered, although the above described findings do not fit neatly into a particular category of histiocytic neoplasm. It is certainly possible that a larger tissue sampling may provide important diagnostic clues, in terms of the overall growth and interface of this tissue with the adjacent normal tissue. Ultimately, this may provide the best chance for a more definitive diagnosis.\"    EGD/EUS from 4/27/23  Final Diagnosis:   A.  Esophageal anastomotic ulcer biopsies:  -Fragments of reactive esophageal squamous mucosa, with few intraepithelial eosinophils, and area of ulceration/erosion with acute and chronic inflammation.  -Peak eosinophil number up to 23 /hpf.  -No evidence of malignancy.  -No glandular component.     B.  Esophageal biopsies:  -Benign glandular tissue with mild acute and chronic inflammation.  -No intestinal metaplasia, dysplasia, or malignancy.  -No squamous component.     C.  Pancreatic head mass, fine-needle biopsies:  -Positive for adenocarcinoma.  -See comment.     D.  Pancreatic head mass, fine-needle biopsies:  -Positive for malignancy, adenocarcinoma, within fibrous tissue.  -Background pancreatic tissue.  -See comment.        IHC showed tumor positive for CDX2, CK7, CK19 and focally positive for p40 and CK20.  Comparison of the histology of [id number] from the original esophageal adenocarcinoma and the present tumor in parts C and D show some histologic similarity, but the IHC pattern is still fairly non-specific.  Further clinical correlation needed.  Dr. Kelin Coleman also reviewed and  concurred.    Assessment and Plan:  53M with a PMH of CAD status post CABG, HLD and HTN was referred by Dr. Yadav for esophageal cancer.    Metastatic Esophageal Cancer, HER2+  -He had a good response to FOLFOX + immunotherapy + Herceptin. While on maintenance immunotherapy + herceptin he developed progressive disease. Given progression, we are planning on restarting chemotherapy. However, we will delay this one because he has his procedure this week. We will repeat a PET/CT after 4 cycles of combined chemo-immunotherapy  -Repeat CBC, CMP,  next week    Bronch-Esophageal fistula: plan for bronchial stent removal ASD occluder this Wednesday     [facility]

## 2024-01-15 NOTE — PROGRESS NOTES
Patient ambulatory. No treatment today per Dr Foster. Port deacessed. Patient [facility]. Discharged in stable condition

## 2024-01-15 NOTE — TELEPHONE ENCOUNTER
NCM spoke to the patient and spouse today for TCM. The patient was recently readmitted for the following:   Dysphagia  Metastatic Esophageal Cancer  Bronchoesophageal fistula  CAD s/p CABG    Appt:   [facility] attempted to schedule a (non TCM) HFU appointment. The patient is requesting an in person visit today or tomorrow, 1/16/2024. The patient declined a virutal visit when offered by the [facility]. The patient declined and stated, \"I would just prefer to go to the [facility] then.\"       The patient is requesting a HFU appointment before the scheduled EGD on 1/17/2024.     A HFU appointment is recommended by 1/20/2024 as the patient is a high risk for readmission.     Towards the end of the TCM call, the patient c/o wheezing and reported plans to go to the [facility] today.     Rx:    The patient reported being completely out of the following Rx:     Medication Quantity Refills Start End   benzonatate 100 MG Oral Cap 30 capsule 0 12/20/2023 --   Sig:   Take 1 capsule (100 mg total) by mouth 3 (three) times daily as needed for cough.     Pharmacy    [id number] [address] [phone], [phone]       The patient's pharmacy reported insurance will not approve a refill until 2/2/2024. The patient did report the Rx has helped with coughing during the day and would like a refill.       Please follow up with the patient to assist with scheduling a Non TCM HFU appointment and send the requested medication. Thank you.

## 2024-01-15 NOTE — PROGRESS NOTES
Initial Post Discharge Follow Up   Discharge Date: 1/13/24  Contact Date: 1/15/2024    Consent Verification:  Assessment Completed With: Patient  HIPAA Verified?  Yes    Discharge Dx:   Dysphagia  Metastatic Esophageal Cancer  Bronchoesophageal fistula  CAD s/p CABG    General:   How have you been since your discharge from the hospital? The patient reported dysphagia has remained present with fluids. The patient denies any difficulty with swallowing foods. The patient reported continued full body ache. The patient did discuss this with Dr. Foster today and got a new Rx for Norco. The patient did report a continued productive cough. The patient denies any SOB, worsened cough, hemoptysis or fever. The patient did report deconditioning/ generalized weakness and dizziness. Dr. Foster was provied a condition update today. The patient reported falling last night after letting go of a door at home. The patient reported a left elbow laceration but denies any other injuries. The patient did report this to Dr. Foster. The patient has had continued diarrhea. The patient's BP today with Dr. Foster was 152/78.   At the end of the call, the patient c/o wheezing with ambulation and activity. The patient reported plans to go to the [facility].   Do you have any pain since discharge?  Yes  Where: full body ache    Rating on pain scale 1-10, 10 being the worst pain you have ever experienced, what is current pain: 8  Is the pain manageable at home? Yes; The patient has a new Rx for [place] from Dr. Foster today.   How well was your pain managed while in the hospital?   On a scale of 1-5   1- Very Poor and 5- Very well   Very Well  When you were leaving the hospital were your discharge instructions reviewed with you? Yes  How well were your discharge instructions explained to you?   On a scale of 1-5   1- Very Poor and 5- Very well   Very Well  Do you have any questions about your discharge instructions?  No  Before leaving the hospital  was your diagnoses explained to you? Yes  Do you have any questions about your diagnoses? No  Are you able to perform normal daily activities of living as you have prior to your hospital stay (dressing, bathing, ambulating to the bathroom, etc)? yes The patient is ambulating at home with the spouse near for assist as needed. NCM did review/stress the importance of fall precautions.   (NCM) Was patient given a different diet per AVS? no      Medications:NCM did confirm the patient has discontinued the following as directed:  STOP taking these medications       guaiFENesin-codeine 100-10 MG/5ML Soln  Commonly known as: Robitussin AC         ipratropium-albuterol 0.5-2.5 (3) MG/3ML Soln  Commonly known as: Duoneb       Current Outpatient Medications   Medication Sig Dispense Refill    baclofen 10 MG Oral Tab Take 1 tablet (10 mg total) by mouth 3 (three) times daily as needed. 90 tablet 0    HYDROcodone-acetaminophen 5-325 MG Oral Tab Take 1-2 tablets by mouth every 8 (eight) hours as needed for Pain. 10 tablet 0    fluticasone furoate-vilanterol 100-25 MCG/ACT Inhalation Aerosol Powder, Breath Activated Inhale 1 puff into the lungs daily. 1 each 2    cetirizine 5 MG Oral Tab Take 1 tablet (5 mg total) by mouth daily.      sucralfate (CARAFATE) 1 g Oral Tab Take 1 tablet (1 g total) by mouth 3 (three) times daily as needed. 60 tablet 3    losartan 50 MG Oral Tab Take 1 tablet (50 mg total) by mouth daily. 90 tablet 2    benzonatate 100 MG Oral Cap Take 1 capsule (100 mg total) by mouth 3 (three) times daily as needed for cough. 30 capsule 0    sodium chloride, hypertonic, 3 % Inhalation Nebu Soln Take 3 mL by nebulization every 8 (eight) hours. 360 mL 1    Omeprazole 40 MG Oral Capsule Delayed Release Take 1 capsule (40 mg total) by mouth 2 (two) times daily before meals. 90 capsule 3    sertraline 100 MG Oral Tab Take 1 tablet (100 mg total) by mouth daily. 90 tablet 1    Pancrelipase, Lip-Prot-Amyl, (CREON)  28064-98447 units Oral Cap DR Particles Take 2 capsules with meals and 1 capsule with snacks 240 capsule 0    metoprolol succinate ER 50 MG Oral Tablet 24 Hr TAKE 1 TABLET BY MOUTH EVERY DAY 90 tablet 1     Were there any changes to your current medication(s) noted on the AVS? Yes  If so, were these medication changes discussed with you prior to leaving the hospital? Yes  If a new medication was prescribed:    Was the new medication's purpose & side effects reviewed? Yes  Do you have any questions about your new medication? No  Did you  your discharge medications when you left the hospital? No; The patient did not start lidocaine patches. This did not help the patient with the pain.   Let's go over your medications together to make sure we are not missing anything. Medications Reviewed  Are there any reasons that keep you from taking your medication as prescribed? No  The patient has also continued vancomycin 125mg twice daily until 1/28/2024. [facility] added this Rx to the patient's medication list.   Are you having any concerns with constipation? No  Did patient receive their flu shot (Sept-March)? Yes    Discharge medications reviewed/discussed/and reconciled against outpatient medications with patient.  Any changes or updates to medications sent to PCP.  Patient Acknowledged     Referrals/orders at D/C:  Referrals/orders placed at D/C? no    Except for Home Health Services mentioned above, have you scheduled these other services? Yes The patient has had chemo today, 1/15/2024.     If yes, have you started these services? yes  DME ordered at D/C? No      Discharge orders, AVS reviewed and discussed with patient. Any changes or updates to orders sent to PCP.  Patient Acknowledged      SDOH:   Transportation:   Transportation Needs: No Transportation Needs (1/8/2024)    Transportation Needs     Lack of Transportation: No      Financial Strain:   Financial Resource Strain: Low Risk  (12/14/2023)    Financial  Resource Strain     Difficulty of Paying Living Expenses: Not very hard     Med Affordability: No     Diagnosis specifics:     Re-Admit:  Tell me what led up to your readmission: The patient returned to the hospital with dysphagia and chest pain.   Did you call your PCP office first? no  Did you attempt to get in with your PCP?  no  Do you feel this could have been prevented? No     Follow up appointments:      Your appointments       Date & Time Appointment Department (Center)    Jan 17, 2024 10:30 AM CST EGD with MARÍA FONTANA [facility] [place] (--)    Please arrive 60 minutes prior to your scheduled procedure time.         Jan 22, 2024  8:00 AM CST CHEMO INFUSION CHEMOTHERAPY with PF TX RN1 [facility] in [place] ([facility])        Jan 22, 2024  8:30 AM CST Follow-Up Visit with Grecia Angelo APRN [facility] in General acute hospital)        Jan 22, 2024  9:00 AM CST PF Hematology Oncology Dietary Follow Up with SERA HEM/ONC EDILMA STONER [facility] in [place] ([facility])    Your appointment is scheduled at the [facility] in [place]. The address is [address] [place] [address]. Please park at the Green Lot and enter through building A. Once you arrive, please register at the Cancer Center  on the second floor.                [facility] in [address] [address]  [phone] [facility] in [address] [address]  [phone] KEL Cowan  No address on file  [phone]            TCC  Was TCC ordered: No; NCM offered an appointment with the TCC. The patient declined and would prefer to follow up with the PCP office.       PCP (If no TCC appointment)  Does patient already have a PCP appointment scheduled? No  NCM Attempted  to schedule PCP office HFU appointment with patient   If no appointment scheduled: ExplainThere were no openings today or tomorrow as the patient requested. The patient also declined any virtual visits. A TE was sent to the PCP office for an appointment request.     The patient reported plans to be seen by the [facility].     Specialist    Does the patient have any other follow up appointment(s) needing to be scheduled? Yes    The patient was seen by Dr. Foster today, 1/15/2024.     Is there any reason as to why you cannot make your appointment(s)?  No     Needs post D/C:   Now that you are home, are there any needs or concerns you need addressed before your next visit with your PCP?  (DME, meds, questions, etc.): Yes A TE was sent tot he PCP office for a Rx request:     The patient reported being completely out of the following Rx:     Medication Quantity Refills Start End   benzonatate 100 MG Oral Cap 30 capsule 0 12/20/2023 --   Sig:   Take 1 capsule (100 mg total) by mouth 3 (three) times daily as needed for cough.     Pharmacy    [facility] [address] [place] [address] [phone], [phone]       The patient's pharmacy reported insurance will not approve a refill until 2/2/2024. The patient did report the Rx has helped with coughing during the day and would like a refill. The PCP office will follow up with the patient directly.     Interventions by NCM:    Reviewed all discharge instructions with the patient with a focus on swallow precautions and pain management. All medications were reviewed and educated on the importance of taking the medications as directed.  Patient symptoms were assessed, education was completed for signs/symptoms to monitor, and reviewed when to contact providers vs go to the ED/call 911. Appointments were reviewed and stressed the importance of a close follow up with providers. A TE as sent to the PCP office for an appointment and Rx request.   The patient received  the influenza vaccine this season.  The patient verbalized understanding and will contact the office with any further questions or concerns.       Overall Rating:   How would you rate the care you received while in the hospital? excellent    CCM referral placed:    Not Applicable    BOOK BY DATE: 1/27/2024

## 2024-01-16 ENCOUNTER — ANESTHESIA EVENT (OUTPATIENT)
Dept: ENDOSCOPY | Facility: HOSPITAL | Age: 54
End: 2024-01-16
Payer: COMMERCIAL

## 2024-01-16 ENCOUNTER — LAB ENCOUNTER (OUTPATIENT)
Dept: LAB | Age: 54
End: 2024-01-16
Attending: FAMILY MEDICINE
Payer: COMMERCIAL

## 2024-01-16 ENCOUNTER — OFFICE VISIT (OUTPATIENT)
Dept: FAMILY MEDICINE CLINIC | Facility: CLINIC | Age: 54
End: 2024-01-16
Payer: COMMERCIAL

## 2024-01-16 VITALS
HEART RATE: 58 BPM | WEIGHT: 173 LBS | SYSTOLIC BLOOD PRESSURE: 130 MMHG | TEMPERATURE: 98 F | DIASTOLIC BLOOD PRESSURE: 64 MMHG | OXYGEN SATURATION: 94 % | BODY MASS INDEX: 22.93 KG/M2 | HEIGHT: 73 IN | RESPIRATION RATE: 12 BRPM

## 2024-01-16 DIAGNOSIS — C15.5 MALIGNANT NEOPLASM OF LOWER THIRD OF ESOPHAGUS (HCC): ICD-10-CM

## 2024-01-16 DIAGNOSIS — R05.2 SUBACUTE COUGH: ICD-10-CM

## 2024-01-16 DIAGNOSIS — K52.9 CHRONIC DIARRHEA: Primary | ICD-10-CM

## 2024-01-16 DIAGNOSIS — I10 PRIMARY HYPERTENSION: ICD-10-CM

## 2024-01-16 DIAGNOSIS — E78.5 HYPERLIPIDEMIA WITH TARGET LOW DENSITY LIPOPROTEIN (LDL) CHOLESTEROL LESS THAN 70 MG/DL: ICD-10-CM

## 2024-01-16 DIAGNOSIS — K91.89 ESOPHAGEAL ANASTOMOTIC LEAK: ICD-10-CM

## 2024-01-16 DIAGNOSIS — K52.9 CHRONIC DIARRHEA: ICD-10-CM

## 2024-01-16 DIAGNOSIS — K22.2 ESOPHAGEAL OBSTRUCTION: ICD-10-CM

## 2024-01-16 PROBLEM — R09.82 POSTNASAL DRIP: Status: RESOLVED | Noted: 2023-11-09 | Resolved: 2024-01-16

## 2024-01-16 PROBLEM — J69.0 ASPIRATION PNEUMONIA OF BOTH LUNGS DUE TO GASTRIC SECRETIONS (HCC): Status: RESOLVED | Noted: 2023-11-06 | Resolved: 2024-01-16

## 2024-01-16 PROBLEM — R09.82 POSTNASAL DRIP: Status: RESOLVED | Noted: 2023-01-01 | Resolved: 2024-01-01

## 2024-01-16 PROBLEM — J69.0 ASPIRATION PNEUMONIA OF BOTH LUNGS DUE TO GASTRIC SECRETIONS (HCC): Status: RESOLVED | Noted: 2023-01-01 | Resolved: 2024-01-01

## 2024-01-16 PROCEDURE — 3078F DIAST BP <80 MM HG: CPT | Performed by: FAMILY MEDICINE

## 2024-01-16 PROCEDURE — 99215 OFFICE O/P EST HI 40 MIN: CPT | Performed by: FAMILY MEDICINE

## 2024-01-16 PROCEDURE — 3008F BODY MASS INDEX DOCD: CPT | Performed by: FAMILY MEDICINE

## 2024-01-16 PROCEDURE — 3075F SYST BP GE 130 - 139MM HG: CPT | Performed by: FAMILY MEDICINE

## 2024-01-16 PROCEDURE — 87493 C DIFF AMPLIFIED PROBE: CPT

## 2024-01-17 ENCOUNTER — ANESTHESIA (OUTPATIENT)
Dept: ENDOSCOPY | Facility: HOSPITAL | Age: 54
End: 2024-01-17
Payer: COMMERCIAL

## 2024-01-17 ENCOUNTER — HOSPITAL ENCOUNTER (OUTPATIENT)
Facility: HOSPITAL | Age: 54
Setting detail: OBSERVATION
Discharge: HOME | End: 2024-01-18
Attending: INTERNAL MEDICINE | Admitting: INTERNAL MEDICINE
Payer: COMMERCIAL

## 2024-01-17 ENCOUNTER — APPOINTMENT (OUTPATIENT)
Dept: GENERAL RADIOLOGY | Facility: HOSPITAL | Age: 54
End: 2024-01-17
Attending: INTERNAL MEDICINE
Payer: COMMERCIAL

## 2024-01-17 LAB
C DIFF TOX B STL QL: NEGATIVE
UR GALACT AG SCR: NEGATIVE

## 2024-01-17 PROCEDURE — 0BC38ZZ EXTIRPATION OF MATTER FROM RIGHT MAIN BRONCHUS, VIA NATURAL OR ARTIFICIAL OPENING ENDOSCOPIC: ICD-10-PCS | Performed by: INTERNAL MEDICINE

## 2024-01-17 PROCEDURE — 0DU38JZ SUPPLEMENT LOWER ESOPHAGUS WITH SYNTHETIC SUBSTITUTE, VIA NATURAL OR ARTIFICIAL OPENING ENDOSCOPIC: ICD-10-PCS | Performed by: INTERNAL MEDICINE

## 2024-01-17 PROCEDURE — 71045 X-RAY EXAM CHEST 1 VIEW: CPT | Performed by: INTERNAL MEDICINE

## 2024-01-17 PROCEDURE — 74328 X-RAY BILE DUCT ENDOSCOPY: CPT | Performed by: INTERNAL MEDICINE

## 2024-01-17 PROCEDURE — 99244 OFF/OP CNSLTJ NEW/EST MOD 40: CPT | Performed by: HOSPITALIST

## 2024-01-17 PROCEDURE — 31648 BRONCHIAL VALVE REMOV INIT: CPT | Performed by: INTERNAL MEDICINE

## 2024-01-17 RX ORDER — HYDROCODONE BITARTRATE AND ACETAMINOPHEN 5; 325 MG/1; MG/1
1-2 TABLET ORAL EVERY 8 HOURS PRN
Status: DISCONTINUED | OUTPATIENT
Start: 2024-01-17 | End: 2024-01-18

## 2024-01-17 RX ORDER — POLYETHYLENE GLYCOL 3350 17 G/17G
17 POWDER, FOR SOLUTION ORAL DAILY PRN
Status: DISCONTINUED | OUTPATIENT
Start: 2024-01-17 | End: 2024-01-18

## 2024-01-17 RX ORDER — MORPHINE SULFATE 4 MG/ML
4 INJECTION, SOLUTION INTRAMUSCULAR; INTRAVENOUS EVERY 2 HOUR PRN
Status: DISCONTINUED | OUTPATIENT
Start: 2024-01-17 | End: 2024-01-18

## 2024-01-17 RX ORDER — ONDANSETRON 2 MG/ML
INJECTION INTRAMUSCULAR; INTRAVENOUS AS NEEDED
Status: DISCONTINUED | OUTPATIENT
Start: 2024-01-17 | End: 2024-01-17 | Stop reason: SURG

## 2024-01-17 RX ORDER — SENNOSIDES 8.6 MG
17.2 TABLET ORAL NIGHTLY PRN
Status: DISCONTINUED | OUTPATIENT
Start: 2024-01-17 | End: 2024-01-18

## 2024-01-17 RX ORDER — SODIUM CHLORIDE, SODIUM LACTATE, POTASSIUM CHLORIDE, CALCIUM CHLORIDE 600; 310; 30; 20 MG/100ML; MG/100ML; MG/100ML; MG/100ML
INJECTION, SOLUTION INTRAVENOUS CONTINUOUS
Status: DISCONTINUED | OUTPATIENT
Start: 2024-01-17 | End: 2024-01-17 | Stop reason: HOSPADM

## 2024-01-17 RX ORDER — ROCURONIUM BROMIDE 10 MG/ML
INJECTION, SOLUTION INTRAVENOUS AS NEEDED
Status: DISCONTINUED | OUTPATIENT
Start: 2024-01-17 | End: 2024-01-17 | Stop reason: SURG

## 2024-01-17 RX ORDER — PROCHLORPERAZINE EDISYLATE 5 MG/ML
5 INJECTION INTRAMUSCULAR; INTRAVENOUS EVERY 8 HOURS PRN
Status: DISCONTINUED | OUTPATIENT
Start: 2024-01-17 | End: 2024-01-17 | Stop reason: HOSPADM

## 2024-01-17 RX ORDER — HYDROMORPHONE HYDROCHLORIDE 1 MG/ML
0.6 INJECTION, SOLUTION INTRAMUSCULAR; INTRAVENOUS; SUBCUTANEOUS EVERY 5 MIN PRN
Status: DISCONTINUED | OUTPATIENT
Start: 2024-01-17 | End: 2024-01-17 | Stop reason: HOSPADM

## 2024-01-17 RX ORDER — BACLOFEN 10 MG/1
10 TABLET ORAL 3 TIMES DAILY PRN
Status: DISCONTINUED | OUTPATIENT
Start: 2024-01-17 | End: 2024-01-18

## 2024-01-17 RX ORDER — HYDROMORPHONE HYDROCHLORIDE 1 MG/ML
INJECTION, SOLUTION INTRAMUSCULAR; INTRAVENOUS; SUBCUTANEOUS
Status: COMPLETED
Start: 2024-01-17 | End: 2024-01-17

## 2024-01-17 RX ORDER — BENZONATATE 100 MG/1
100 CAPSULE ORAL 3 TIMES DAILY PRN
Status: DISCONTINUED | OUTPATIENT
Start: 2024-01-17 | End: 2024-01-18

## 2024-01-17 RX ORDER — MIDAZOLAM HYDROCHLORIDE 1 MG/ML
1 INJECTION INTRAMUSCULAR; INTRAVENOUS EVERY 5 MIN PRN
Status: DISCONTINUED | OUTPATIENT
Start: 2024-01-17 | End: 2024-01-17 | Stop reason: HOSPADM

## 2024-01-17 RX ORDER — ACETAMINOPHEN 500 MG
500 TABLET ORAL EVERY 4 HOURS PRN
Status: DISCONTINUED | OUTPATIENT
Start: 2024-01-17 | End: 2024-01-18

## 2024-01-17 RX ORDER — ONDANSETRON 2 MG/ML
4 INJECTION INTRAMUSCULAR; INTRAVENOUS EVERY 6 HOURS PRN
Status: DISCONTINUED | OUTPATIENT
Start: 2024-01-17 | End: 2024-01-17 | Stop reason: HOSPADM

## 2024-01-17 RX ORDER — LABETALOL HYDROCHLORIDE 5 MG/ML
5 INJECTION, SOLUTION INTRAVENOUS EVERY 5 MIN PRN
Status: DISCONTINUED | OUTPATIENT
Start: 2024-01-17 | End: 2024-01-17 | Stop reason: HOSPADM

## 2024-01-17 RX ORDER — METOPROLOL SUCCINATE 50 MG/1
50 TABLET, EXTENDED RELEASE ORAL DAILY
Status: DISCONTINUED | OUTPATIENT
Start: 2024-01-18 | End: 2024-01-18

## 2024-01-17 RX ORDER — DEXAMETHASONE SODIUM PHOSPHATE 4 MG/ML
VIAL (ML) INJECTION AS NEEDED
Status: DISCONTINUED | OUTPATIENT
Start: 2024-01-17 | End: 2024-01-17 | Stop reason: SURG

## 2024-01-17 RX ORDER — MORPHINE SULFATE 2 MG/ML
1 INJECTION, SOLUTION INTRAMUSCULAR; INTRAVENOUS EVERY 2 HOUR PRN
Status: DISCONTINUED | OUTPATIENT
Start: 2024-01-17 | End: 2024-01-18

## 2024-01-17 RX ORDER — MIDAZOLAM HYDROCHLORIDE 1 MG/ML
INJECTION INTRAMUSCULAR; INTRAVENOUS AS NEEDED
Status: DISCONTINUED | OUTPATIENT
Start: 2024-01-17 | End: 2024-01-17 | Stop reason: SURG

## 2024-01-17 RX ORDER — ENEMA 19; 7 G/133ML; G/133ML
1 ENEMA RECTAL ONCE AS NEEDED
Status: DISCONTINUED | OUTPATIENT
Start: 2024-01-17 | End: 2024-01-18

## 2024-01-17 RX ORDER — HYDROMORPHONE HYDROCHLORIDE 1 MG/ML
0.4 INJECTION, SOLUTION INTRAMUSCULAR; INTRAVENOUS; SUBCUTANEOUS EVERY 5 MIN PRN
Status: DISCONTINUED | OUTPATIENT
Start: 2024-01-17 | End: 2024-01-17 | Stop reason: HOSPADM

## 2024-01-17 RX ORDER — MELATONIN
3 NIGHTLY PRN
Status: DISCONTINUED | OUTPATIENT
Start: 2024-01-17 | End: 2024-01-18

## 2024-01-17 RX ORDER — SODIUM CHLORIDE, SODIUM LACTATE, POTASSIUM CHLORIDE, CALCIUM CHLORIDE 600; 310; 30; 20 MG/100ML; MG/100ML; MG/100ML; MG/100ML
INJECTION, SOLUTION INTRAVENOUS CONTINUOUS
Status: DISCONTINUED | OUTPATIENT
Start: 2024-01-17 | End: 2024-01-18

## 2024-01-17 RX ORDER — ECHINACEA PURPUREA EXTRACT 125 MG
1 TABLET ORAL
Status: DISCONTINUED | OUTPATIENT
Start: 2024-01-17 | End: 2024-01-18

## 2024-01-17 RX ORDER — MORPHINE SULFATE 2 MG/ML
2 INJECTION, SOLUTION INTRAMUSCULAR; INTRAVENOUS EVERY 2 HOUR PRN
Status: DISCONTINUED | OUTPATIENT
Start: 2024-01-17 | End: 2024-01-18

## 2024-01-17 RX ORDER — ONDANSETRON 2 MG/ML
4 INJECTION INTRAMUSCULAR; INTRAVENOUS EVERY 6 HOURS PRN
Status: DISCONTINUED | OUTPATIENT
Start: 2024-01-17 | End: 2024-01-18

## 2024-01-17 RX ORDER — VANCOMYCIN HYDROCHLORIDE 125 MG/1
125 CAPSULE ORAL 2 TIMES DAILY
Status: DISCONTINUED | OUTPATIENT
Start: 2024-01-17 | End: 2024-01-18

## 2024-01-17 RX ORDER — HYDROMORPHONE HYDROCHLORIDE 1 MG/ML
0.2 INJECTION, SOLUTION INTRAMUSCULAR; INTRAVENOUS; SUBCUTANEOUS EVERY 5 MIN PRN
Status: DISCONTINUED | OUTPATIENT
Start: 2024-01-17 | End: 2024-01-17 | Stop reason: HOSPADM

## 2024-01-17 RX ORDER — BISACODYL 10 MG
10 SUPPOSITORY, RECTAL RECTAL
Status: DISCONTINUED | OUTPATIENT
Start: 2024-01-17 | End: 2024-01-18

## 2024-01-17 RX ORDER — PANTOPRAZOLE SODIUM 40 MG/1
40 TABLET, DELAYED RELEASE ORAL
Status: DISCONTINUED | OUTPATIENT
Start: 2024-01-17 | End: 2024-01-18

## 2024-01-17 RX ORDER — SERTRALINE HYDROCHLORIDE 100 MG/1
100 TABLET, FILM COATED ORAL DAILY
Status: DISCONTINUED | OUTPATIENT
Start: 2024-01-18 | End: 2024-01-18

## 2024-01-17 RX ORDER — CETIRIZINE HYDROCHLORIDE 5 MG/1
5 TABLET ORAL DAILY
Status: DISCONTINUED | OUTPATIENT
Start: 2024-01-17 | End: 2024-01-18

## 2024-01-17 RX ORDER — LOSARTAN POTASSIUM 50 MG/1
50 TABLET ORAL DAILY
Status: DISCONTINUED | OUTPATIENT
Start: 2024-01-18 | End: 2024-01-18

## 2024-01-17 RX ORDER — ONDANSETRON 2 MG/ML
INJECTION INTRAMUSCULAR; INTRAVENOUS
Status: COMPLETED
Start: 2024-01-17 | End: 2024-01-17

## 2024-01-17 RX ORDER — NALOXONE HYDROCHLORIDE 0.4 MG/ML
0.08 INJECTION, SOLUTION INTRAMUSCULAR; INTRAVENOUS; SUBCUTANEOUS AS NEEDED
Status: DISCONTINUED | OUTPATIENT
Start: 2024-01-17 | End: 2024-01-17 | Stop reason: HOSPADM

## 2024-01-17 RX ORDER — PROCHLORPERAZINE EDISYLATE 5 MG/ML
5 INJECTION INTRAMUSCULAR; INTRAVENOUS EVERY 8 HOURS PRN
Status: DISCONTINUED | OUTPATIENT
Start: 2024-01-17 | End: 2024-01-18

## 2024-01-17 RX ADMIN — MIDAZOLAM HYDROCHLORIDE 2 MG: 1 INJECTION INTRAMUSCULAR; INTRAVENOUS at 10:50:00

## 2024-01-17 RX ADMIN — SODIUM CHLORIDE, SODIUM LACTATE, POTASSIUM CHLORIDE, CALCIUM CHLORIDE: 600; 310; 30; 20 INJECTION, SOLUTION INTRAVENOUS at 10:50:00

## 2024-01-17 RX ADMIN — ROCURONIUM BROMIDE 50 MG: 10 INJECTION, SOLUTION INTRAVENOUS at 10:57:00

## 2024-01-17 RX ADMIN — ONDANSETRON 4 MG: 2 INJECTION INTRAMUSCULAR; INTRAVENOUS at 11:18:00

## 2024-01-17 RX ADMIN — SODIUM CHLORIDE, SODIUM LACTATE, POTASSIUM CHLORIDE, CALCIUM CHLORIDE: 600; 310; 30; 20 INJECTION, SOLUTION INTRAVENOUS at 12:09:00

## 2024-01-17 RX ADMIN — DEXAMETHASONE SODIUM PHOSPHATE 8 MG: 4 MG/ML VIAL (ML) INJECTION at 11:06:00

## 2024-01-17 NOTE — H&P
[facility]  Pre-op H and P    Dontae Buddy Cortez Jr. Patient Status:  Outpatient in a Bed    10/15/1970 MRN [id number]   Location [facility] ENDOSCOPY PAIN CENTER Attending Raheel Ritchie MD   Date 2024 PCP Adrian Horowitz MD     CC: Esophagobronchial fistula    History of Present Illness:  Dontae Cortez JrFritz is a a(n) 53 year old male with underlying metastatic malignant process primary esophageal adenocarcinoma and pancreas adenocarcinoma 1ary vs metastatic, post esophagectomy and gastric pull through, developed esophagobronchial fistula 2ary to deep ulceration at distal esophagus/anastomosis, failed endoscopic stenting bronchial and esophageal with persistent fistula, also failed attempt at over the scope clip due to non compliant tissue, now with aspiration symptoms, unable to tolerate fluids due to immediate cough, we discussed off label use and prior literature date of cases treated with ASD atrial septal defect occluder for this intention and patient and family (wife ) are agreeable with plan    History:  Past Medical History:   Diagnosis Date    Back problem     Belching 2022    Black stools 2022    Borderline diabetes     Dx in 2013 - HgA1C 6.2%    C. difficile diarrhea 10/23/2022    pt treated and without symptoms    Chest pain 2022    Coronary artery disease     On 13: CABG x 4 with LIMA to LAD and SVG to diagonal, OM and PDA    Decorative tattoo 2000    Depression     Disorder of liver     LIVER CA    Esophageal cancer (HCC)     completed chemo    Esophageal reflux     Essential hypertension     Exposure to medical diagnostic radiation     last tx 2022    Frequent urination 2013    Gastroparesis     Heartburn 2022    High blood pressure     High cholesterol 2013    Found when I had quadruple bypass    History of COVID-19 10/16/2022    asymptomatic - pt was dx during a hospitalization for another diagnosis. No continued  symptoms    History of stomach ulcers     Hyperlipidemia     Hyperlipidemia LDL goal < 70     Indigestion 02/01/2022    Morbid obesity with BMI of 40.0-44.9, adult (HCC)     Muscle weakness     Nontoxic multinodular goiter     Dx in 8/2013: pt was told that imaging showed thyroid cysts per PCP    Pancreatic cancer (HCC)     last dose 12/4/2023 is scheduled for another round 12/27/23    Peripheral vascular disease (HCC)     pt denies    Personal history of antineoplastic chemotherapy     for esophageal cancer/completed    Personal history of antineoplastic chemotherapy 12/2023    pancreatic cancer    Problems with swallowing 02/01/2022    Pulmonary nodules     Dx in 8/2013: CT chest showed small bilateral fissural-based lung nodules less than 1 cm    S/P CABG x 4     On 8/16/13: CABG x 4 with LIMA to LAD and SVG to diagonal, OM and PDA    Shortness of breath     Vomiting 02/01/2022     Past Surgical History:   Procedure Laterality Date    APPENDECTOMY      APPENDECTOMY      ARTHROSCOPY OF JOINT UNLISTED      right shoulder    CABG      On 8/16/13: CABG x 4 with LIMA to LAD and SVG to diagonal, OM and PDA    CARDIAC CATH LAB      On 8/14/2013: cardiac cath showed 3-vessel disease    OTHER SURGICAL HISTORY      1.       Laparoscopic robotic-assisted esophagogastrectomy.     Family History   Problem Relation Age of Onset    Cancer Mother         breast and colon     Diabetes Neg       reports that he quit smoking about 10 years ago. His smoking use included cigarettes. He has never used smokeless tobacco. He reports that he does not currently use alcohol. He reports that he does not use drugs.    Allergies:  No Known Allergies    Medications:  No current facility-administered medications for this encounter.    Physical Exam:    Height 6' 1\" (1.854 m), weight 171 lb (77.6 kg).    General: Appears alert, oriented x3 and in no acute distress.  CV: Normal rate   Lungs: Normal effort   Skin: Warm and dry.  Laboratory Data:          Assessment/Plan/Procedure:  Patient Active Problem List   Diagnosis    Primary hypertension    Hyperlipidemia with target low density lipoprotein (LDL) cholesterol less than 70 mg/dL    Coronary artery disease involving native coronary artery of native heart without angina pectoris    S/P CABG x 4    Nontoxic multinodular goiter    Pulmonary nodules    Thrombophlebitis leg    BRANDAN (generalized anxiety disorder)    Right shoulder Arthroscopy acromioplasty ,distal  clavicle resection, rotator cuff/labral debridment  Global 05/13/2021    Right bicipital tenosynovitis    Malignant neoplasm of esophagus (HCC)    Esophageal anastomotic leak    Esophageal obstruction    On total parenteral nutrition (TPN)    Mechanical complication of esophagostomy (HCC)    Abdominal pain of unknown etiology    Migration of esophageal stent    Gastroparesis    Esophageal carcinoma (HCC)    Normocytic anemia    Esophageal fistula    Subacute cough    Acquired bronchoesophageal fistula (HCC)    Pneumonia of both lower lobes due to infectious organism    Malignant neoplasm of pancreas (HCC)    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 difficile colitis    Dysphagia, unspecified type    Shortness of breath    Hypokalemia    Dysphagia       Esophagobronchial fistula    Plan:  EGD fistula closure    Raheel Ritchie MD  1/17/2024  9:47 AM

## 2024-01-17 NOTE — ANESTHESIA PROCEDURE NOTES
Airway  Date/Time: 1/17/2024 11:04 AM  Urgency: elective    Airway not difficult    General Information and Staff    Patient location during procedure: OR  Anesthesiologist: Martin White MD  Performed: anesthesiologist   Performed by: Martin White MD  Authorized by: Martin White MD      Indications and Patient Condition  Indications for airway management: anesthesia  Sedation level: deep  Preoxygenated: yes  Patient position: sniffing  Mask difficulty assessment: 1 - vent by mask    Final Airway Details  Final airway type: endotracheal airway      Successful airway: ETT  Cuffed: yes   Successful intubation technique: direct laryngoscopy  Endotracheal tube insertion site: oral  Blade: Sowmya  Blade size: #3  ETT size (mm): 8.5    Cormack-Lehane Classification: grade I - full view of glottis  Placement verified by: capnometry   Measured from: teeth  ETT to teeth (cm): 22  Number of attempts at approach: 1

## 2024-01-17 NOTE — ANESTHESIA PROCEDURE NOTES
Airway  Date/Time: 1/17/2024 10:58 AM  Urgency: elective      General Information and Staff    Patient location during procedure: OR  Anesthesiologist: Martin White MD  Performed: anesthesiologist   Performed by: Martin White MD  Authorized by: Martin White MD      Indications and Patient Condition  Indications for airway management: anesthesia  Sedation level: deep  Preoxygenated: yes  Patient position: sniffing  Mask difficulty assessment: 1 - vent by mask    Final Airway Details  Final airway type: endotracheal airway      Successful airway: ETT  Cuffed: yes   Successful intubation technique: direct laryngoscopy  Endotracheal tube insertion site: oral  Blade: Sowmya  Blade size: #3  ETT size (mm): 8.5    Cormack-Lehane Classification: grade I - full view of glottis  Placement verified by: capnometry   Measured from: teeth  ETT to teeth (cm): 22  Number of attempts at approach: 1

## 2024-01-17 NOTE — ANESTHESIA POSTPROCEDURE EVALUATION
[facility]    Dontae Cortez  Patient Status:  Outpatient in a Bed   Age/Gender 53 year old male MRN [id number]   Location [facility] POST ANESTHESIA CARE UNIT Attending Raheel Ritchie MD   Hosp Day # 0 PCP Adrian Horowitz MD       Anesthesia Post-op Note    ESOPHAGOGASTRODUODENOSCOPY (EGD) under fluoroscopy with dilation to 4mm and fistula closure under general anesthesia and Bronchoscopy performed by Chapo Madrid MD    Procedure Summary       Date: 01/17/24 Room / Location:  ENDOSCOPY 01 /  ENDOSCOPY    Anesthesia Start: 1050 Anesthesia Stop: 1209    Procedure: ESOPHAGOGASTRODUODENOSCOPY (EGD) under fluoroscopy with dilation to 4mm and fistula closure under general anesthesia and Bronchoscopy performed by Chapo Madrid MD Diagnosis:       Esophageal dysphagia      Abnormal gastrointestinal PET scan      Malignant neoplasm of lower third of esophagus (HCC)      Gastroesophageal reflux disease with esophagitis without hemorrhage      Malignant neoplasm of esophagus, unspecified location (HCC)      (esophago-bronchial fistula)    Surgeons: Raheel Ritchie MD Anesthesiologist: Martin White MD    Anesthesia Type: general ASA Status: 3            Anesthesia Type: general    Vitals Value Taken Time   /71 01/17/24 1234   Temp 98.5 °F (36.9 °C) 01/17/24 1220   Pulse 63 01/17/24 1235   Resp 18 01/17/24 1235   SpO2 97 % 01/17/24 1235   Vitals shown include unfiled device data.    Patient Location: PACU    Anesthesia Type: general    Airway Patency: patent    Postop Pain Control: adequate    Mental Status: mildly sedated but able to meaningfully participate in the post-anesthesia evaluation    Nausea/Vomiting: none    Cardiopulmonary/Hydration status: stable euvolemic    Complications: no apparent anesthesia related complications    Postop vital signs: stable    Dental Exam: Unchanged from Postop

## 2024-01-17 NOTE — OPERATIVE REPORT
[facility]    Dontae Cortez Jr. Patient Status:  Outpatient in a Bed    10/15/1970 MRN [id number]   Location [facility] POST ANESTHESIA CARE UNIT Attending Raheel Ritchie MD   Hosp Day # 0 PCP Adrian Horowitz MD     OPERATIVE REPORT:     DATE OF OPERATION: 24      PREOPERATIVE DIAGNOSIS(ES): bronchoesophageal fistula     POSTOPERATIVE DIAGNOSIS(ES): same     OPERATION(S) PERFORMED: Bronchoscopy with removal of endobronchial stent and bronchoscopic assistance provided for placement of ASD occluder device     SURGEON: Chapo Madrid MD    ANESTHESIA: General. Please see separate flow sheet.      EQUIPMENT:   Olympus adult videobronchoscope.  Standard biopsy forceps    INDICATION: The patient is a 53 year old, who was found to have bronchoesophageal fistula with previous stenting however he continued to have symptoms. Bronchoscopy last week demonstrated continued leakage around the stent.  The procedure is being undertaken to remove the stent and assist with placement of ASD occluder device with Dr. Ritchie. Differential diagnosis, risks, benefits and alternatives were discussed at length.     CONSENT:  Risks and benefits were reviewed with the patient in detail regarding the procedure as well as anesthesia prior to the procedure. All questions were answered, and the patient was agreeable.     PROCEDURE:  After informed consent was obtained, the patient was brought to the bronchoscopy suite.  Time out performed.  Patient was placed in a supine position, anesthesia administered, and topical lidocaine given for local anesthesia.     First, the videobronchoscope was used and inserted via endotracheal tube. The bronchoscope was then advanced into the trachea. Evaluation of the trachea and left sided airways demonstrated normal endobronchial examination to the subsegmental level.   Upon evaluation of the right lung, a covered endobronchial stent was positioned in the right main stem with the  proximal end just at the takeoff of the right main stem bronchus. The distal end of the stent terminated in the proximal bronchus intermedius.  The right upper lobe takeoff could be seen lateral to the stent proximally. The stent is not seated fully against the medial aspect of the right main stem.    Aside from the above findings, there were no other significant abnormalities noted, specifically no other fistulae.   Using the biopsy forceps, the endobronchial stent was grasped and removed entirely - during this process, the anesthesia extubated the patient to allow removal of the stent.  The patient was reintubated by anesthesia and bronchoscope reinserted.  The bronchoeosophageal fistula was visualized approximately 15mm distal to the juan in the posterior-medial aspect of the right main stem. The remainder of the airway was without significant disease.    Bronchoscopic assistance was provided for the remainder of the case to provide endobronchial view to assist Dr. Ritchie with placement of the ASD occluder device which was performed successsfully.  The device did cause mild obstruction of the right main stem (approximately 33%) but the airway was otherwise patent.  There was no evidence of bleeding and the bronchoscope was then withdrawn. The patient tolerated the procedure well without immediate complications.    EBL:  <5ml     IMPRESSION:   Bronchoesophageal fistula     PLAN:   Monitor for persistent symptoms  Will need to follow up as outpatient    Chapo Madrid MD

## 2024-01-17 NOTE — PROGRESS NOTES
Patient previously was positive for C diff & was on oral vanco at home. At first he wasn't sure but told RN his oral vanco will be completed on the 28th.He also  told RN he is stilll having stools, soft to watery because he takes creon for pacreatic cancer. Patient later on claims his stool isn't watery but semi soft to loose w/ chunks & that his last dose of vanco was Sunday. Infectious RN notiied.

## 2024-01-17 NOTE — OPERATIVE REPORT
Dontae Cortez Jr. Patient Status:  Outpatient in a Bed    10/15/1970 MRN [id number]   formerly [facility] Attending Raheel Ritchie MD   Date 2024 PCP Adrian Horowitz MD     PREOPERATIVE DIAGNOSIS/INDICATION: 53 year old male with underlying metastatic malignant process primary esophageal adenocarcinoma and pancreas adenocarcinoma 1ary vs metastatic, post esophagectomy and gastric pull through, developed esophagobronchial fistula 2ary to deep ulceration at distal esophagus/anastomosis, failed endoscopic stenting bronchial and esophageal with persistent fistula, also failed attempt at over the scope clip due to non compliant tissue, now with aspiration symptoms, unable to tolerate fluids due to immediate cough, we discussed off label use and prior literature date of cases treated with ASD atrial septal defect occluder for this intention and patient and family (wife) are agreeable with plan   POSTOPERTATIVE DIAGNOSIS: Same  PROCEDURE PERFORMED: EGD with Fistula closure with ASD occluder device  TIME OUT WAS PERFORMED    SEDATION: General Anesthesia with endotracheal intubation    INFORMED CONSENT: Risks, benefits and alternatives to the procedure were explained to the patient including but not limited to bleeding, infection, perforation, adverse drug reactions, pancreatitis and the need for hospitalization and surgery if this occurs, the patient understands and agrees to procedure.  PROCEDURE DESCRIPTION: A regular upper endoscope was introduced into the patient’s mouth, hypo pharynx, esophagus, stomach and the first and second portion of the duodenum, retroflexion of the endoscope was performed in the stomach. Careful examination of the above described areas was performed on withdrawal of the endoscope. The patient tolerated the procedure well, there were no immediate complication immediately following the procedure, and the patient was transferred to recovery in stable  condition.  FINDINGS:  ESOPHAGUS: Post surgical changes c/w distal esophagectomy and gastric pull through, anastomosis healed ulcer depressed fibrotic non compliant scar and at its base showed a fistula 2 mm tract opening  GASTRIC: Post surgical changes and gastric pull through, patent pyloric, post POEM scar at antrum, migrated esophageal stent into gastric lumen  THERAPEUTICS: Under direct visualization endoscopic EGD and bronchoscopic (Dr Madrid) and fluoroscopic support, Dr Madrid removed the previously placed bronchial stent, we then advanced through the gastroscope a 0.035 inch stiff guidewire 250 cm in length pass the fistula into the right main bronchus, we then removed the scope leaving the guidewire in place, then reintroduced the scope side by side of the guidewire, we then over the wire advanced a 4 mm balloon dilator which was placed though the 10 fr [place] Cardioform delivery catheter, we proceed to dilate the fistula tract to 4 mm, removed the guidewire and then over the balloon advanced the 10 Fr catheter into the right main bronchus, we then proceed to deploy the distal end of the ASD 20 mm [place] Cardioform septal occluder into the right main bronchus and then proximal end at the esophageal lumen, placement look good, no complications noted, patient tolerated procedure well and was transferred to PACU in stable condition  RECOMMENDATIONS:   Post EGD precautions, watch for bleeding, infection, perforation, adverse drug reactions   Admit for observation   Advance diet as tolerated  Follow up OV  after discharge    Raheel Ritchie MD  1/17/2024  12:07 PM

## 2024-01-17 NOTE — H&P
[facility] Interventional Pulmonology  Report of Consultation    Dontae Cortez JrFritz Patient Status:  Outpatient in a Bed    10/15/1970 MRN [id number]   Location [facility] Attending Raheel Ritchie MD   Hosp Day # 0 PCP Adrian Horowitz MD     Reason for evaluation:  Bronchoesophageal fistula    History of Present Illness:  Dontae Cortez Jr. is a a(n) 53 year old male former smoker with PMH metastatic esophageal adenoca s/p esophagectomy 2022 complicated previously with leak s/p stent placement, bronchoesophagela fistula s/p endobronchial stent at [facility] and esophageal stenting here, CAD/CABG  who was recently admitted with worsening cough and pain, underwent bronchoscopy last week showing incomplete opposition of the endobronchial stent over the bronchoesophageal fistula. Now presenting for endoscopy with fistula closure and endobronchial stent removal.       History:  Past Medical History:   Diagnosis Date    Back problem     Belching 2022    Black stools 2022    Borderline diabetes     Dx in 2013 - HgA1C 6.2%    C. difficile diarrhea 10/23/2022    pt treated and without symptoms    Chest pain 2022    Coronary artery disease     On 13: CABG x 4 with LIMA to LAD and SVG to diagonal, OM and PDA    Decorative tattoo 2000    Depression     Disorder of liver     LIVER CA    Esophageal cancer (HCC)     completed chemo    Esophageal reflux     Essential hypertension     Exposure to medical diagnostic radiation     last tx 2022    Frequent urination 2013    Gastroparesis     Heartburn 2022    High blood pressure     High cholesterol 2013    Found when I had quadruple bypass    History of COVID-19 10/16/2022    asymptomatic - pt was dx during a hospitalization for another diagnosis. No continued symptoms    History of stomach ulcers     Hyperlipidemia     Hyperlipidemia LDL goal < 70     Indigestion 2022    Morbid obesity with  BMI of 40.0-44.9, adult (HCC)     Muscle weakness     Nontoxic multinodular goiter     Dx in 8/2013: pt was told that imaging showed thyroid cysts per PCP    Pancreatic cancer (HCC)     last dose 12/4/2023 is scheduled for another round 12/27/23    Peripheral vascular disease (HCC)     pt denies    Personal history of antineoplastic chemotherapy     for esophageal cancer/completed    Personal history of antineoplastic chemotherapy 12/2023    pancreatic cancer    Problems with swallowing 02/01/2022    Pulmonary nodules     Dx in 8/2013: CT chest showed small bilateral fissural-based lung nodules less than 1 cm    S/P CABG x 4     On 8/16/13: CABG x 4 with LIMA to LAD and SVG to diagonal, OM and PDA    Shortness of breath     Vomiting 02/01/2022     Past Surgical History:   Procedure Laterality Date    APPENDECTOMY      APPENDECTOMY      ARTHROSCOPY OF JOINT UNLISTED      right shoulder    CABG      On 8/16/13: CABG x 4 with LIMA to LAD and SVG to diagonal, OM and PDA    CARDIAC CATH LAB      On 8/14/2013: cardiac cath showed 3-vessel disease    OTHER SURGICAL HISTORY      1.       Laparoscopic robotic-assisted esophagogastrectomy.     Family History   Problem Relation Age of Onset    Cancer Mother         breast and colon     Diabetes Neg       reports that he quit smoking about 10 years ago. His smoking use included cigarettes. He has never used smokeless tobacco. He reports that he does not currently use alcohol. He reports that he does not use drugs.    Allergies:  No Known Allergies    Medications:  No current outpatient medications on file.        Review of Systems:   Constitutional: Negative for anorexia, chills, fatigue, fevers, malaise, night sweats and weight loss.  Eyes: Negative for visual disturbance, irritation and redness.  Ears, nose, mouth, throat, and face: Negative for hearing loss, tinnitus, nasal congestion, snoring, sore throat, hoarseness and voice change.  Respiratory: +cough and pain as noted  above  Cardiovascular: as above, negative for palpitations, irregular heart beats, syncope, fatigue, orthopnea, paroxysmal nocturnal dyspnea, lower extremity edema.  Gastrointestinal: Negative for dysphagia, odynophagia, reflux symptoms, nausea, vomiting, change in bowel habits, diarrhea, constipation and abdominal pain.  Integument/breast: Negative for rash, skin lesions, and pruritus.  Hematologic/lymphatic: Negative for easy bruising, bleeding, and lymphadenopathy.  Musculoskeletal: Negative for myalgias, arthralgias, muscle weakness.  Neurological: Negative for headaches, dizziness, seizures, memory problems, trouble swallowing, speech problems, gait problems and weakness.  : Denies dysuria, hematuria, urinary retention.  Behavioral/Psych: Normal affect, mood, speech. No AVH, No SI/HI    All other review of systems are negative.    Vital signs in last 24 hours:  Patient Vitals for the past 24 hrs:   BP Pulse Resp SpO2 Height Weight   01/17/24 1004 128/79 56 18 100 % 6' 1\" (1.854 m) 171 lb (77.6 kg)       Intake/Output:  No intake or output data in the 24 hours ending 01/17/24 1034      PHYSICAL EXAM  GEN: Appears alert, comfortable. No acute distress  PSYCH: Normal mood and affect, AAOX3  NEURO: CN 2-12 grossly intact, no focal deficits  HEENT: Atraumatic, normocephalic, EOMI, no icterus/hemorrhage, no conjunctival injection/discharge, nares normal  MOUTH: MMM, good dentition  NECK: Trachea midline, symmetric, no visible masses or scars, no crepitus, normal flexion/extension  CHEST: Normal chest excursion, no visible deformity or scars, no tenderness to palpation  PULMONARY:CTAB no wheeze  COR: RRR no m  GI: NABS X 4, S/NT/ND, No hernias or HSM  LYMPHATIC: No palpable or visible lymphadenopathy in neck, axillae, groin  MSK: Normal strength and sensation in all extremities  EXT: No overt deformities, No C/C/E, 2+ DP/PT pulses b/l   SKIN: No rashes, ulcers, nodules    Lab Data Review:  Recent Labs   Lab  01/12/24  0539 01/15/24  0811   GLU 87 109*   BUN 9 21   CREATSERUM 0.51* 0.74   CA 9.0 9.3    139   K 4.6 4.2    105   CO2 31.0 29.0     Recent Labs   Lab 01/12/24  0539 01/15/24  0811   RBC 3.59* 3.89*   HGB 10.6* 11.7*   HCT 34.1* 37.2*   MCV 95.0 95.6   MCH 29.5 30.1   MCHC 31.1 31.5   RDW 14.2 14.3   NEPRELIM 6.08 6.97   WBC 8.2 9.5   .0 272.0     No results for input(s): \"BNP\" in the last 168 hours.  No results for input(s): \"TROP\", \"CK\" in the last 168 hours.  No results for input(s): \"PT\", \"INR\", \"PTT\" in the last 168 hours.    Other Labs:     ABG:  No results for input(s): \"ABGPHT\", \"DARWKX4Z\", \"QGUIP0H\", \"ABGHCO3\", \"ABGBE\", \"TEMP\", \"TACOS\", \"SITE\", \"DEV\", \"THGB\" in the last 168 hours.    Invalid input(s): \"DAC88GKX\", \"CHOB\"    Cultures:   No results found for this visit on 01/17/24.  No results for input(s): \"COLORUR\", \"CLARITY\", \"SPECGRAVITY\", \"GLUUR\", \"BILUR\", \"KETUR\", \"BLOODURINE\", \"PHURINE\", \"PROUR\", \"UROBILINOGEN\", \"NITRITE\", \"LEUUR\", \"WBCUR\", \"RBCUR\", \"BACUR\", \"EPIUR\" in the last 168 hours.    Radiology:   No results found.    ASSESSMENT  bronchoesophageal fistula s/p esophageal stenting (now removed 12/12/2023) and endobronchial stenting at [facility]  Bilateral nodules/infiltrates suspect related to aspiration episodes and/or COVID moreso than malignancy  COVID infection/pneumonia - diagnosed 12/10/2023  Cough, dysphagia related to above  Postprandial cough. Related to above  Chest pain/pressure - unclear if related to above vs musculoskeletal  Metastatic esophageal adenocarcinoma s/p gastroesphagectomy 9/2022  Anemia, thrombocytopenia  GERD     PLAN  Plan for bronchoscopy with stent removal today and repair of fistula with Dr. Ritchie today. Procedure reviewed in detail with patient and wife, benefits/risks reviewed - plant o proceed    Chapo Madrid MD

## 2024-01-17 NOTE — PLAN OF CARE
Patient received from PACU following bronchial/esophageal closure.Patient had prior stent which was removed today after which a closure device was placed. PMHx of CAD status post CABG, HLD and HTN, metastatic esophageal adenocarcinoma, severe esophagitis,dysphagia. Patient is awake & was brought in by PACU nurse.Vital signs stable. Already ambulating to the bathroom. Admission data completed. PTA meds updated. Informed Dr Brito who told RN he will see patient. & placed admission orders. Patient complains of chest discomfort & throat pain due to intubation during procedure.Writer gave patient a  dose of morphine 2mg IV w/ relief noted. Spouse at bedside Patient/spouse updated of plan of care/ .

## 2024-01-17 NOTE — ANESTHESIA PREPROCEDURE EVALUATION
PRE-OP EVALUATION    Patient Name: Dontae Cortez Jr.    Admit Diagnosis: Malignant neoplasm of lower third of esophagus (HCC) [C15.5]    Pre-op Diagnosis: Malignant neoplasm of lower third of esophagus (HCC) [C15.5]    ESOPHAGOGASTRODUODENOSCOPY (EGD) under fluoroscopy with fistula closure under general anesthesia and Bronchoscopy performed by Chapo Madrid MD    Anesthesia Procedure: ESOPHAGOGASTRODUODENOSCOPY (EGD) under fluoroscopy with fistula closure under general anesthesia and Bronchoscopy performed by Chapo Madrid MD    Surgeon(s) and Role:     * Raheel Ritchie MD - Primary    Pre-op vitals reviewed.  Temp: 97.7 °F (36.5 °C)  Pulse: 56  Resp: 18  BP: 128/79  SpO2: 100 %  Body mass index is 22.56 kg/m².    Current medications reviewed.  Hospital Medications:   lactated ringers infusion   Intravenous Continuous       Outpatient Medications:     Medications Prior to Admission   Medication Sig Dispense Refill Last Dose    baclofen 10 MG Oral Tab Take 1 tablet (10 mg total) by mouth 3 (three) times daily as needed. 90 tablet 0 1/16/2024    HYDROcodone-acetaminophen 5-325 MG Oral Tab Take 1-2 tablets by mouth every 8 (eight) hours as needed for Pain. 10 tablet 0 1/16/2024    vancomycin 125 MG Oral Cap Take 1 capsule (125 mg total) by mouth in the morning and 1 capsule (125 mg total) before bedtime.   1/17/2024    fluticasone furoate-vilanterol 100-25 MCG/ACT Inhalation Aerosol Powder, Breath Activated Inhale 1 puff into the lungs daily. 1 each 2 1/17/2024    cetirizine 5 MG Oral Tab Take 1 tablet (5 mg total) by mouth daily.   1/17/2024    sucralfate (CARAFATE) 1 g Oral Tab Take 1 tablet (1 g total) by mouth 3 (three) times daily as needed. 60 tablet 3 1/16/2024    losartan 50 MG Oral Tab Take 1 tablet (50 mg total) by mouth daily. 90 tablet 2 1/17/2024    benzonatate 100 MG Oral Cap Take 1 capsule (100 mg total) by mouth 3 (three) times daily as needed for cough. 30 capsule 0 Past Month    sodium  chloride, hypertonic, 3 % Inhalation Nebu Soln Take 3 mL by nebulization every 8 (eight) hours. 360 mL 1 1/17/2024    Omeprazole 40 MG Oral Capsule Delayed Release Take 1 capsule (40 mg total) by mouth 2 (two) times daily before meals. 90 capsule 3 1/17/2024    sertraline 100 MG Oral Tab Take 1 tablet (100 mg total) by mouth daily. 90 tablet 1 1/17/2024    Pancrelipase, Lip-Prot-Amyl, (CREON) 20472-82260 units Oral Cap DR Particles Take 2 capsules with meals and 1 capsule with snacks 240 capsule 0 1/16/2024    metoprolol succinate ER 50 MG Oral Tablet 24 Hr TAKE 1 TABLET BY MOUTH EVERY DAY 90 tablet 1 1/17/2024 at 0800       Allergies: Patient has no known allergies.      Anesthesia Evaluation    Patient summary reviewed.    Anesthetic Complications  (-) history of anesthetic complications         GI/Hepatic/Renal  Comment: Hx of esophageal cancer s/p esophagectomy. Recurrent dysphagia.     (+) GERD                           Cardiovascular        Exercise tolerance: good     MET: >4    (+) obesity  (+) hypertension     (+) CAD    (+) CABG/stent                            Endo/Other                                  Pulmonary                           Neuro/Psych      (+) depression  (+) anxiety                      Patient Active Problem List:     Essential hypertension     Hyperlipidemia with target low density lipoprotein (LDL) cholesterol less than 70 mg/dL     S/P CABG x 4     Nontoxic multinodular goiter     Pulmonary nodules     Thrombophlebitis leg     BRANDAN (generalized anxiety disorder)     Right shoulder Arthroscopy acromioplasty ,distal  clavicle resection, rotator cuff/labral debridment  Global 05/13/2021     Right bicipital tenosynovitis     Malignant neoplasm of lower third of esophagus (HCC)     Esophageal anastomotic leak     Esophageal obstruction     On total parenteral nutrition (TPN)     Mechanical complication of esophagostomy (HCC)     Abdominal pain of unknown etiology     Gastroparesis      Aspiration pneumonitis (HCC)     Malignant neoplasm of esophagus, unspecified location (HCC)     Normocytic anemia           Past Surgical History:   Procedure Laterality Date    APPENDECTOMY      APPENDECTOMY      ARTHROSCOPY OF JOINT UNLISTED      right shoulder    CABG      On 8/16/13: CABG x 4 with LIMA to LAD and SVG to diagonal, OM and PDA    CARDIAC CATH LAB      On 8/14/2013: cardiac cath showed 3-vessel disease    OTHER SURGICAL HISTORY      1.       Laparoscopic robotic-assisted esophagogastrectomy.     Social History     Socioeconomic History    Marital status:     Number of children: 3   Occupational History    Occupation: works as  - on workman's comp   Tobacco Use    Smoking status: Former     Packs/day: 0.00     Years: 27.00     Additional pack years: 0.00     Total pack years: 0.00     Types: Cigarettes     Quit date: 8/15/2013     Years since quitting: 10.4    Smokeless tobacco: Never    Tobacco comments:     Quit smoking 2013   Vaping Use    Vaping Use: Never used   Substance and Sexual Activity    Alcohol use: Not Currently    Drug use: Never    Sexual activity: Not Currently     Partners: Female   Other Topics Concern    Caffeine Concern Yes    Stress Concern No    Weight Concern No    Special Diet No    Exercise No    Seat Belt No     History   Drug Use Unknown     Available pre-op labs reviewed.  Lab Results   Component Value Date    WBC 9.5 01/15/2024    RBC 3.89 (L) 01/15/2024    HGB 11.7 (L) 01/15/2024    HCT 37.2 (L) 01/15/2024    MCV 95.6 01/15/2024    MCH 30.1 01/15/2024    MCHC 31.5 01/15/2024    RDW 14.3 01/15/2024    .0 01/15/2024     Lab Results   Component Value Date     01/15/2024    K 4.2 01/15/2024     01/15/2024    CO2 29.0 01/15/2024    BUN 21 01/15/2024    CREATSERUM 0.74 01/15/2024     (H) 01/15/2024    CA 9.3 01/15/2024            Airway      Mallampati: II  Mouth opening: >3 FB  TM distance: 4 - 6 cm  Neck ROM: full  Cardiovascular      Rhythm: regular  Rate: normal     Dental    Dentition appears grossly intact         Pulmonary      Breath sounds clear to auscultation bilaterally.               Other findings              ASA: 3   Plan: general  NPO status verified and patient meets guidelines.    Post-procedure pain management plan discussed with surgeon and patient.    Comment: This chart review is in preparation for upcoming procedure  Plan/risks discussed with: patient  Use of blood product(s) discussed with: patient    Consented to blood products.          Present on Admission:  **None**

## 2024-01-17 NOTE — CONSULTS
[place] HOSPITALIST  CONSULT     Dontae Cortez Jr. Patient Status:  Observation    10/15/1970 MRN [id number]   Location [facility] 4NW-A Attending Raheel Ritchie MD   Hosp Day # 0 PCP Adrian Horowitz MD     Reason for consult: Medical management     Requested by: Dr. Ritchie    Subjective:   History of Present Illness:     Dontae Cortez Jr. is a 53 year old male with CAD s/p CABG, metastatic esophageal cancer s/p gastroesophagostomy w/ bronchoesophageal fistula s/p stents who presented for EGD w/ fistula closure and endobronchial stent removal and placeent of ASC occluder device.    Patient feeling well after procedure, drinking water without issue.  Denies fever, chills, n/v.  No other acute complaints.      History/Other:    Past Medical History:  Past Medical History:   Diagnosis Date    Back problem     Belching 2022    Black stools 2022    Borderline diabetes     Dx in 2013 - HgA1C 6.2%    C. difficile diarrhea 10/23/2022    pt treated and without symptoms    Chest pain 2022    Coronary artery disease     On 13: CABG x 4 with LIMA to LAD and SVG to diagonal, OM and PDA    Decorative tattoo 2000    Depression     Disorder of liver     LIVER CA    Esophageal cancer (HCC)     completed chemo    Esophageal reflux     Essential hypertension     Exposure to medical diagnostic radiation     last tx 2022    Frequent urination 2013    Gastroparesis     Heartburn 2022    High blood pressure     High cholesterol 2013    Found when I had quadruple bypass    History of COVID-19 10/16/2022    asymptomatic - pt was dx during a hospitalization for another diagnosis. No continued symptoms    History of stomach ulcers     Hyperlipidemia     Hyperlipidemia LDL goal < 70     Indigestion 2022    Morbid obesity with BMI of 40.0-44.9, adult (HCC)     Muscle weakness     Nontoxic multinodular goiter     Dx in 2013: pt was told that imaging showed  thyroid cysts per PCP    Pancreatic cancer (HCC)     last dose 2023 is scheduled for another round 23    Peripheral vascular disease (HCC)     pt denies    Personal history of antineoplastic chemotherapy     for esophageal cancer/completed    Personal history of antineoplastic chemotherapy 2023    pancreatic cancer    Problems with swallowing 2022    Pulmonary nodules     Dx in 2013: CT chest showed small bilateral fissural-based lung nodules less than 1 cm    S/P CABG x 4     On 13: CABG x 4 with LIMA to LAD and SVG to diagonal, OM and PDA    Shortness of breath     Vomiting 2022     Past Surgical History:   Past Surgical History:   Procedure Laterality Date    APPENDECTOMY      APPENDECTOMY      ARTHROSCOPY OF JOINT UNLISTED      right shoulder    CABG      On 13: CABG x 4 with LIMA to LAD and SVG to diagonal, OM and PDA    CARDIAC CATH LAB      On 2013: cardiac cath showed 3-vessel disease    OTHER SURGICAL HISTORY      1.       Laparoscopic robotic-assisted esophagogastrectomy.      Family History:   Family History   Problem Relation Age of Onset    Cancer Mother         breast and colon     Diabetes Neg      Social History:    reports that he quit smoking about 10 years ago. His smoking use included cigarettes. He has never used smokeless tobacco. He reports that he does not currently use alcohol. He reports that he does not use drugs.     Allergies: No Known Allergies    Medications:    Current Facility-Administered Medications on File Prior to Encounter   Medication Dose Route Frequency Provider Last Rate Last Admin    [COMPLETED] heparin (Porcine) 100 Units/mL lock flush 500 Units  5 mL Intracatheter Once Charles Maguire MD   500 Units at 24 1104    [COMPLETED] HYDROmorphone (Dilaudid) 1 MG/ML injection             [] naloxone (Narcan) 0.4 MG/ML injection 0.08 mg  0.08 mg Intravenous Once PRN Junaid Evangelista, DO        [COMPLETED] potassium chloride  (K-Dur) tab 40 mEq  40 mEq Oral Once Jan Brito MD   40 mEq at 24 0833    [COMPLETED] morphINE PF 4 MG/ML injection 4 mg  4 mg Intravenous Once Jose E Chung MD   4 mg at 24    [] sodium chloride 0.9% infusion   Intravenous Continuous Jose E Chung MD   New Bag at 24 1002    [] morphINE PF 4 MG/ML injection 4 mg  4 mg Intravenous Q30 Min PRN Jose E Chung MD        [] ondansetron (Zofran) 4 MG/2ML injection 4 mg  4 mg Intravenous Q4H PRN Jose E Chung MD        [COMPLETED] potassium chloride 40 mEq in 250mL sodium chloride 0.9% IVPB premix  40 mEq Intravenous Once Jose E Chung MD 62.5 mL/hr at 24 40 mEq at 24    [COMPLETED] morphINE PF 4 MG/ML injection 4 mg  4 mg Intravenous Once Jose E Chung MD   4 mg at 24    [COMPLETED] HYDROcodone-acetaminophen (Norco) 5-325 MG per tab 1 tablet  1 tablet Oral Once Jose E Chung MD   1 tablet at 24 2253    [COMPLETED] heparin (Porcine) 100 Units/mL lock flush 500 Units  5 mL Intracatheter Once Senia Foster MD   500 Units at 24 1123    [COMPLETED] potassium chloride 40 mEq in 250mL sodium chloride 0.9% IVPB premix  40 mEq Intravenous Once Megan Arshad MD 62.5 mL/hr at 24 1436 40 mEq at 24 1436    [COMPLETED] iopamidol 76% (ISOVUE-370) injection for power injector  80 mL Intravenous ONCE PRN Sophia Gibson MD   80 mL at 23 0248    [COMPLETED] potassium chloride 40 mEq in 250mL sodium chloride 0.9% IVPB premix  40 mEq Intravenous Once Jose Roberto Myles DO 62.5 mL/hr at 23 1328 40 mEq at 23 1328    [COMPLETED] ipratropium-albuterol (Duoneb) 0.5-2.5 (3) MG/3ML inhalation solution 3 mL  3 mL Nebulization Once Konrad Cuba MD   3 mL at 23 1617    [COMPLETED] ketorolac (Toradol) 15 MG/ML injection 15 mg  15 mg Intravenous Once Konrad Cuba MD   15 mg at 23 1649    [] ondansetron (Zofran) 4  MG/2ML injection 4 mg  4 mg Intravenous Q4H PRN Konrad Cuba MD        [COMPLETED] piperacillin-tazobactam (Zosyn) 3.375 g in dextrose 5% 100 mL IVPB-ADDV  3.375 g Intravenous Once Konrad Cuba MD   Stopped at 23 175    [COMPLETED] ketorolac (Toradol) 15 MG/ML injection 15 mg  15 mg Intravenous Once Konrad Cuba MD   15 mg at 23 181    [COMPLETED] morphINE PF 4 MG/ML injection 4 mg  4 mg Intravenous Once Konrad Cuba MD   4 mg at 23 195    [COMPLETED] morphINE PF 4 MG/ML injection 4 mg  4 mg Intravenous Once Konrad Cuba MD   4 mg at 23    [COMPLETED] trastuzumab-qyyp (Trazimera) 504 mg in sodium chloride 0.9% 274 mL infusion  6 mg/kg (Treatment Plan Recorded) Intravenous Once Nataly Siddiqui APRN   Stopped at 23 0930    [COMPLETED] sodium chloride 0.9 % IV bolus 500 mL  500 mL Intravenous Once Freddy Hernandez MD   Stopped at 23    [COMPLETED] ipratropium-albuterol (Duoneb) 0.5-2.5 (3) MG/3ML inhalation solution 3 mL  3 mL Nebulization Once Freddy Hernandez MD   3 mL at 23 1950    [COMPLETED] HYDROmorphone (Dilaudid) 1 MG/ML injection 0.5 mg  0.5 mg Intravenous Q30 Min PRN Freddy Hernandez MD   0.5 mg at 23 2239    [COMPLETED] iohexol (Omnipaque) 350 MG/ML injection via power injector 100 mL  100 mL Intravenous ONCE PRN Freddy Hernandez MD   100 mL at 23 2142    [COMPLETED] morphINE PF 4 MG/ML injection 4 mg  4 mg Intravenous Q30 Min PRN Wero Chiang MD   4 mg at 23 0813    [COMPLETED] magnesium sulfate in sterile water for injection 2 g/50mL IVPB premix 2 g  2 g Intravenous Once Megan Arshad MD 50 mL/hr at 23 1413 2 g at 23 1413    [] sodium chloride 0.9% infusion   Intravenous Continuous Nikia Fairchild MD        [] HYDROmorphone (Dilaudid) 1 MG/ML injection 1 mg  1 mg Intravenous Q30 Min PRN Nikia Fairchild MD        [] ondansetron  (Zofran) 4 MG/2ML injection 4 mg  4 mg Intravenous Q4H PRN Nikia Fairchild MD        [COMPLETED] iopamidol 76% (ISOVUE-370) injection for power injector  100 mL Intravenous ONCE PRN Nikia Fairchild MD   100 mL at 12/10/23 2109    [COMPLETED] hydrALAzine (Apresoline) 20 mg/mL injection 10 mg  10 mg Intravenous Once Nikia Fairchild MD   10 mg at 12/10/23 2231    [COMPLETED] pembrolizumab (Keytruda) 400 mg in sodium chloride 0.9% 116 mL IVPB  400 mg Intravenous Once Grecia Angelo APRN   Stopped at 12/04/23 1243    [COMPLETED] trastuzumab-qyyp (Trazimera) 504 mg in sodium chloride 0.9% 274 mL infusion  6 mg/kg (Treatment Plan Recorded) Intravenous Once Grecia Angelo APRN   Stopped at 12/04/23 1319    [COMPLETED] magnesium sulfate in sterile water for injection 2 g/50mL IVPB premix 2 g  2 g Intravenous Once Glen Myles MD 50 mL/hr at 11/27/23 2011 2 g at 11/27/23 2011    [COMPLETED] morphINE PF 4 MG/ML injection 4 mg  4 mg Intravenous Once Dede Jacques MD   4 mg at 11/26/23 1125    [COMPLETED] morphINE PF 2 MG/ML injection 2 mg  2 mg Intravenous Once Dede Jacques MD   2 mg at 11/26/23 1337    [COMPLETED] iopamidol 76% (ISOVUE-370) injection for power injector  75 mL Intravenous ONCE PRN Dede Jacques MD   75 mL at 11/26/23 1347    [COMPLETED] piperacillin-tazobactam (Zosyn) 4.5 g in dextrose 5% 100 mL IVPB-ADDV  4.5 g Intravenous Once Dede Jacques  mL/hr at 11/26/23 1517 4.5 g at 11/26/23 1517    [COMPLETED] vancomycin (Vancocin) 1.25 g in sodium chloride 0.9% 250mL IVPB premix  15 mg/kg Intravenous Once Glen Myles .7 mL/hr at 11/26/23 1758 1,250 mg at 11/26/23 1758    [COMPLETED] morphINE PF 2 MG/ML injection 2 mg  2 mg Intravenous Once Dede Jacques MD   2 mg at 11/26/23 1528    [COMPLETED] influenza vaccine split quad (Fluzone QIV) 0.5 mL IM injection (ages 6 months to 64 years) 0.5 mL  0.5 mL Intramuscular Prior to discharge Eitan Landeros MD    0.5 mL at 11/11/23 1240    [COMPLETED] potassium chloride 20 mEq/100mL IVPB premix 20 mEq  20 mEq Intravenous Once Eitan Landeros MD   Stopped at 11/10/23 1751    [COMPLETED] magnesium sulfate in sterile water for injection 2 g/50mL IVPB premix 2 g  2 g Intravenous Once Eitan Landeros MD   Stopped at 11/10/23 1751    [COMPLETED] iron sucrose (Venofer) 20 mg/mL injection 200 mg  200 mg Intravenous Daily Senia Foster MD   200 mg at 11/10/23 0927    [COMPLETED] magnesium sulfate in sterile water for injection 2 g/50mL IVPB premix 2 g  2 g Intravenous Once Eitan Landeros MD 50 mL/hr at 11/08/23 0834 2 g at 11/08/23 0834    [COMPLETED] iopamidol 76% (ISOVUE-370) injection for power injector  80 mL Intravenous ONCE PRN Eitan Landeros MD   80 mL at 11/08/23 1612    [COMPLETED] morphINE PF 4 MG/ML injection 4 mg  4 mg Intravenous Once Freddy Duff MD   4 mg at 11/06/23 1519    [COMPLETED] ondansetron (Zofran) 4 MG/2ML injection 4 mg  4 mg Intravenous Once Freddy Duff MD   4 mg at 11/06/23 1519    [COMPLETED] trastuzumab-qyyp (Trazimera) 588 mg in sodium chloride 0.9% 278 mL infusion  6 mg/kg (Treatment Plan Recorded) Intravenous Once Senia Foster MD   Stopped at 10/31/23 1442     Current Outpatient Medications on File Prior to Encounter   Medication Sig Dispense Refill    baclofen 10 MG Oral Tab Take 1 tablet (10 mg total) by mouth 3 (three) times daily as needed. 90 tablet 0    HYDROcodone-acetaminophen 5-325 MG Oral Tab Take 1-2 tablets by mouth every 8 (eight) hours as needed for Pain. 10 tablet 0    fluticasone furoate-vilanterol 100-25 MCG/ACT Inhalation Aerosol Powder, Breath Activated Inhale 1 puff into the lungs daily. 1 each 2    cetirizine 5 MG Oral Tab Take 1 tablet (5 mg total) by mouth daily.      sucralfate (CARAFATE) 1 g Oral Tab Take 1 tablet (1 g total) by mouth 3 (three) times daily as needed. 60 tablet 3    losartan 50 MG Oral Tab Take 1 tablet (50 mg total) by mouth daily. 90  tablet 2    benzonatate 100 MG Oral Cap Take 1 capsule (100 mg total) by mouth 3 (three) times daily as needed for cough. 30 capsule 0    sodium chloride, hypertonic, 3 % Inhalation Nebu Soln Take 3 mL by nebulization every 8 (eight) hours. 360 mL 1    Omeprazole 40 MG Oral Capsule Delayed Release Take 1 capsule (40 mg total) by mouth 2 (two) times daily before meals. 90 capsule 3    sertraline 100 MG Oral Tab Take 1 tablet (100 mg total) by mouth daily. 90 tablet 1    Pancrelipase, Lip-Prot-Amyl, (CREON) 80225-79585 units Oral Cap DR Particles Take 2 capsules with meals and 1 capsule with snacks 240 capsule 0    metoprolol succinate ER 50 MG Oral Tablet 24 Hr TAKE 1 TABLET BY MOUTH EVERY DAY 90 tablet 1       Review of Systems:   A comprehensive review of systems was completed.    Pertinent positives and negatives noted in the HPI.    Objective:   Physical Exam:    /73   Pulse 60   Temp 98.2 °F (36.8 °C)   Resp 12   Ht 6' 1\" (1.854 m)   Wt 171 lb (77.6 kg)   SpO2 94%   BMI 22.56 kg/m²   General: No acute distress, Alert, pleasant   Respiratory: No rhonchi, no wheezes  Cardiovascular: S1, S2. Regular rate and rhythm  Abdomen: Soft, NT/ND, +BS  Neuro: No new focal deficits  Extremities: No edema    Results:    Labs:      Labs Last 24 Hours:  Recent Labs   Lab 01/12/24  0539 01/15/24  0811   WBC 8.2 9.5   HGB 10.6* 11.7*   MCV 95.0 95.6   .0 272.0       Recent Labs   Lab 01/12/24  0539 01/15/24  0811   GLU 87 109*   BUN 9 21   CREATSERUM 0.51* 0.74   CA 9.0 9.3   ALB  --  3.1*    139   K 4.6 4.2    105   CO2 31.0 29.0   ALKPHO  --  118*   AST  --  27   ALT  --  31   BILT  --  0.4   TP  --  7.9       No results for input(s): \"PTP\", \"INR\" in the last 168 hours.    No results for input(s): \"TROP\", \"CK\" in the last 168 hours.      Imaging: Imaging data reviewed in Epic.    Assessment & Plan:      #Esophagobronchial fistula   #Dysphagia   S/p EGD for fistula closure and bronchoscopy with  stent removal on 1/17  Pulm and GI on case  Pain control, anti-emetics  Advance diet as tolerated    #CAD s/p CABG - continue home meds  #C. Diff -> continue vanco taper  #GERD - PPI  #Depression - Zoloft     #Dislodged bypass hardware   From previous CABG, likely cause of chest pain symptoms  Outpatient follow up     #Metastatic esophageal cancer s/p gastroesophagostomy 9/22  #Metastatic pancreatic cancer  Follows with Dr. Foster        Plan of care discussed with patient, nurse    Jan Brito MD  1/17/2024    The 21st Century Cures Act makes medical notes like these available to patients in the interest of transparency. Please be advised this is a medical document. Medical documents are intended to carry relevant information, facts as evident, and the clinical opinion of the practitioner. The medical note is intended as peer to peer communication and may appear blunt or direct. It is written in medical language and may contain abbreviations or verbiage that are unfamiliar.

## 2024-01-18 ENCOUNTER — TELEPHONE (OUTPATIENT)
Dept: FAMILY MEDICINE CLINIC | Facility: CLINIC | Age: 54
End: 2024-01-18

## 2024-01-18 VITALS
HEIGHT: 73 IN | HEART RATE: 49 BPM | DIASTOLIC BLOOD PRESSURE: 72 MMHG | SYSTOLIC BLOOD PRESSURE: 135 MMHG | BODY MASS INDEX: 22.66 KG/M2 | OXYGEN SATURATION: 95 % | TEMPERATURE: 98 F | WEIGHT: 171 LBS | RESPIRATION RATE: 18 BRPM

## 2024-01-18 LAB
ANION GAP SERPL CALC-SCNC: 3 MMOL/L (ref 0–18)
BASOPHILS # BLD AUTO: 0.04 X10(3) UL (ref 0–0.2)
BASOPHILS NFR BLD AUTO: 0.6 %
BUN BLD-MCNC: 13 MG/DL (ref 9–23)
CALCIUM BLD-MCNC: 8.6 MG/DL (ref 8.5–10.1)
CHLORIDE SERPL-SCNC: 106 MMOL/L (ref 98–112)
CO2 SERPL-SCNC: 32 MMOL/L (ref 21–32)
CREAT BLD-MCNC: 0.52 MG/DL
EGFRCR SERPLBLD CKD-EPI 2021: 120 ML/MIN/1.73M2 (ref 60–?)
EOSINOPHIL # BLD AUTO: 0.13 X10(3) UL (ref 0–0.7)
EOSINOPHIL NFR BLD AUTO: 2 %
ERYTHROCYTE [DISTWIDTH] IN BLOOD BY AUTOMATED COUNT: 14 %
GLUCOSE BLD-MCNC: 107 MG/DL (ref 70–99)
HCT VFR BLD AUTO: 28.1 %
HGB BLD-MCNC: 9.1 G/DL
IMM GRANULOCYTES # BLD AUTO: 0.1 X10(3) UL (ref 0–1)
IMM GRANULOCYTES NFR BLD: 1.6 %
LYMPHOCYTES # BLD AUTO: 0.87 X10(3) UL (ref 1–4)
LYMPHOCYTES NFR BLD AUTO: 13.6 %
MAGNESIUM SERPL-MCNC: 1.9 MG/DL (ref 1.6–2.6)
MCH RBC QN AUTO: 30.2 PG (ref 26–34)
MCHC RBC AUTO-ENTMCNC: 32.4 G/DL (ref 31–37)
MCV RBC AUTO: 93.4 FL
MONOCYTES # BLD AUTO: 0.65 X10(3) UL (ref 0.1–1)
MONOCYTES NFR BLD AUTO: 10.2 %
NEUTROPHILS # BLD AUTO: 4.59 X10 (3) UL (ref 1.5–7.7)
NEUTROPHILS # BLD AUTO: 4.59 X10(3) UL (ref 1.5–7.7)
NEUTROPHILS NFR BLD AUTO: 72 %
OSMOLALITY SERPL CALC.SUM OF ELEC: 293 MOSM/KG (ref 275–295)
PLATELET # BLD AUTO: 166 10(3)UL (ref 150–450)
POTASSIUM SERPL-SCNC: 3.9 MMOL/L (ref 3.5–5.1)
RBC # BLD AUTO: 3.01 X10(6)UL
SODIUM SERPL-SCNC: 141 MMOL/L (ref 136–145)
WBC # BLD AUTO: 6.4 X10(3) UL (ref 4–11)

## 2024-01-18 PROCEDURE — 99233 SBSQ HOSP IP/OBS HIGH 50: CPT | Performed by: HOSPITALIST

## 2024-01-18 RX ORDER — RUFINAMIDE 40 MG/ML
1 SUSPENSION ORAL DAILY
Status: DISCONTINUED | OUTPATIENT
Start: 2024-01-19 | End: 2024-01-18

## 2024-01-18 NOTE — PLAN OF CARE
Pt is aaox4, complains of chest pain along his old CABG incision site, 7-9/10, requesting Morphine at least every 2 hours with slight relief, monitored.  Problem: GASTROINTESTINAL - ADULT  Goal: Minimal or absence of nausea and vomiting  Description: INTERVENTIONS:  - Maintain adequate hydration with IV or PO as ordered and tolerated  - Nasogastric tube to low intermittent suction as ordered  - Evaluate effectiveness of ordered antiemetic medications  - Provide nonpharmacologic comfort measures as appropriate  - Advance diet as tolerated, if ordered  - Obtain nutritional consult as needed  - Evaluate fluid balance  Outcome: Progressing     Problem: SAFETY ADULT - FALL  Goal: Free from fall injury  Description: INTERVENTIONS:  - Assess pt frequently for physical needs  - Identify cognitive and physical deficits and behaviors that affect risk of falls.  - Wayne fall precautions as indicated by assessment.  - Educate pt/family on patient safety including physical limitations  - Instruct pt to call for assistance with activity based on assessment  - Modify environment to reduce risk of injury  - Provide assistive devices as appropriate  - Consider OT/PT consult to assist with strengthening/mobility  - Encourage toileting schedule  Outcome: Progressing     Problem: PAIN - ADULT  Goal: Verbalizes/displays adequate comfort level or patient's stated pain goal  Description: INTERVENTIONS:  - Encourage pt to monitor pain and request assistance  - Assess pain using appropriate pain scale  - Administer analgesics based on type and severity of pain and evaluate response  - Implement non-pharmacological measures as appropriate and evaluate response  - Consider cultural and social influences on pain and pain management  - Manage/alleviate anxiety  - Utilize distraction and/or relaxation techniques  - Monitor for opioid side effects  - Notify MD/LIP if interventions unsuccessful or patient reports new pain  - Anticipate  increased pain with activity and pre-medicate as appropriate  Outcome: Progressing

## 2024-01-18 NOTE — PROGRESS NOTES
Patient is A/O x4, verified BM status and confirmed he is no longer having diarrhea. Reported small pink tinged sputum x1. Denies any SOB or BECKY. Continue to c/o moderate to severe chest and throat pain but denies any difficulty swallowing. VSS and afebrile, needs addressed. Call light within reach.

## 2024-01-18 NOTE — DIETARY MALNUTRITION NOTE
[facility]    NUTRITION ASSESSMENT    Pt meets moderate malnutrition criteria at this time.    CRITERIA FOR MALNUTRITION DIAGNOSIS:  Criteria for non-severe malnutrition diagnosis: chronic illness related to wt loss 20% in 1 year, energy intake less than75% for greater than 1 month, and moderate muscle and fat depletion      NUTRITION INTERVENTION:    RD nutrition Care Plan- See RD nutrition assessment for additional recommendations  Meal and Snacks - Monitor and encourage adequate PO intake.  Medical Food Supplements - Pt not interested at this time. Rationale/use for oral supplements discussed.  Vitamin and Mineral Supplements - adding Chewable MVI        PATIENT STATUS: 01/18/24 52yo male admit for EGD with fistula closure and endobronchial stent removal. Pt well known to Nutrition services as pt has multiple previous hospitalizations. Pt seen by RD due to consult for \"cancer weight loss\". Pt with severe wt loss of 73lb (30%) x 8 months. Offered ONS as pt with continued wt loss, pt declined as he is following closely with Outpatient Oncology RD. Will continue to monitor.     PMH: CAD s/p CABG, Metastatic Esophageal Cancer s/p Gastroesophagostomy w/ Bronchoesophageal fistula s/p stents       ANTHROPOMETRICS:  Ht: 185.4 cm (6' 1\")  Wt: 77.6 kg (171 lb).   BMI: Body mass index is 22.56 kg/m².  IBW: 83.6 kg      WEIGHT HISTORY:   Weight loss: Yes, Severe Wt loss of 73 lbs, 30%, over 8 months     Wt Readings from Last 30 Encounters:   01/17/24 77.6 kg (171 lb)   01/16/24 78.5 kg (173 lb)   01/15/24 77.6 kg (171 lb)   01/08/24 81.6 kg (180 lb)   12/29/23 83.5 kg (184 lb)   12/26/23 83.5 kg (184 lb)   12/07/23 84.8 kg (187 lb)   11/06/23 90.7 kg (200 lb)   10/31/23 92.8 kg (204 lb 8 oz)   10/30/23 91.9 kg (202 lb 8 oz)   08/28/23 99.3 kg (219 lb)   08/07/23 100.7 kg (222 lb)   08/04/23 99 kg (218 lb 4.8 oz)   07/24/23 102.7 kg (226 lb 8 oz)   06/05/23 106.1 kg (234 lb)   05/17/23 109 kg (240 lb 6.4 oz)   05/01/23  110.7 kg (244 lb)        NUTRITION:  Diet:       Procedures    Low Fiber/Soft diet Low Fiber/Soft; Is Patient on Accuchecks? No      Food Allergies: No  Cultural/Ethnic/Temple Preferences Addressed: Yes    Percent Meals Eaten (last 3 days)       None            GI system review: swallowing dysfunction Last BM: 1/17  Skin and wounds: WNL    NUTRITION RELATED PHYSICAL FINDINGS:     1. Body Fat/Muscle Mass: moderate depletion body fat Orbital fat pad and Buccal fat pad, moderate muscle depletion Temple region, Shoulder/Acromion process, Thigh region, and Calf region, and severe muscle depletion Clavicle region     2. Fluid Accumulation: none per RN documentation     NUTRITION PRESCRIPTION: 77.6kg  Calories: 2198-9410 calories/day (30-35 kcal/kg)  Protein:  grams protein/day (1.2-1.5 grams protein per kg)  Fluid: ~1 ml/kcal or per MD discretion    NUTRITION DIAGNOSIS/PROBLEM:  Malnutrition related to physiological causes and decreased intake as evidenced by documented/reported insufficient oral intake, documented/reported unintentional weight loss, loss of fat mass, and loss of muscle mass      MONITOR AND EVALUATE/NUTRITION GOALS:  PO intake of 75% of meals TID - New  PO intake of 75% of oral nutrition supplement/s - New  Weight stable within 1 to 2 lbs during admission - New      MEDICATIONS:  Zenpep, Protonix    LABS:  Reviewed    Pt is at Moderate nutrition risk      Hieu Sevilla, MS, RDN, LDN  Clinical Dietitian  [id number]

## 2024-01-18 NOTE — PROGRESS NOTES
Gastroenterology Progress Note  Dontae Cortez Jr. Patient Status:  Observation    10/15/1970 MRN [id number]   [facility] 4NW-A Attending Raheel Ritchie MD   Hosp Day # 0 PCP Adrian Horowitz MD     Chief Complaint: Esophagobronchial fistula   S: Pt tolerating PO intake (solids + liquids) without nausea, cough, vomiting, diarrhea, melena, or hematochezia. Reports pain since his procedure, some improvement with narcotics. Pain does not worsen with PO intake  O: /60 (BP Location: Left arm)   Pulse 58   Temp 97.4 °F (36.3 °C) (Oral)   Resp 20   Ht 6' 1\" (1.854 m)   Wt 171 lb (77.6 kg)   SpO2 93%   BMI 22.56 kg/m²   Gen: AAOx3; thin   CV: Bradycardic, regular   Resp: CTA bilaterally; No increase in respiratory effort   Abd: (+)BS, soft, non-tender, non-distended; no rebound or guarding  Ext: No edema or cyanosis  Skin: Warm and dry  Laboratory Data:     No results for input(s): \"PGLU\" in the last 168 hours.  No results for input(s): \"INR\" in the last 168 hours.      Recent Labs   Lab 24  0539 01/15/24  0811 24  0618   WBC 8.2 9.5 6.4   HGB 10.6* 11.7* 9.1*   .0 272.0 166.0       Recent Labs   Lab 24  0539 01/15/24  0811 24  0618    139 141   K 4.6 4.2 3.9    105 106   CO2 31.0 29.0 32.0   BUN 9 21 13   CREATSERUM 0.51* 0.74 0.52*       Recent Labs   Lab 01/15/24  0811   ALT 31   AST 27     Assessment: 53 yr-old male with hx of CAD s/p CABG, HTN, dyslipidemia, and distal esophageal adenocarcinoma (dx 2022) s/p chemoRT + s/p esophagectomy (2022; Dr Lu) with recovery c/b anastomotic leak s/p endoscopic closure with stenting and course further c/b GOO s/p EGD with esophageal stenting and Botox (2022). More recently pt noted to have pancreatic head mass (2023) with bx revealing poorly differentiated carcinoma.   Over the last several months pt has been struggling with bronchoesophageal  fistula which has been treated with esophageal stenting (since removed) and also bronchial stenting ([facility]) without improvement in cough and aspiration symptoms. Pt presented yesterday for EGD with fistula closure using ASD occluder (off label use) + bronchoscopy with removal of bronchial stent. Since procedure pt tolerating all PO intake without cough or nausea. No overt GI bleeding noted. Pt with pain--some improvement with current pain regimen.   Plan:   Pain management per Hospitalist recommendations   OK to continue PO intake as tolerated; await dietary recommendations   OK for discharge from a GI perspective once pain is under control   Will arrange follow-up with Dr Ritchie--office contacted and will arrange with the pt  Discussed with Dr Ritchie and ARTEMIO Moore  8:31 AM  1/18/2024  [facility]  [phone]

## 2024-01-18 NOTE — PROGRESS NOTES
[facility]     Hospitalist Progress Note     Dontae Buddy Cortez  Patient Status:  Observation    10/15/1970 MRN [id number]   [facility] 4NW-A Attending Raheel Ritchie MD   Hosp Day # 0 PCP Adrian Horowitz MD     Chief Complaint: esophagobronchial fistula    Subjective:     Patient feels ok.  Has some pain but tolerting oral intake    Objective:    Review of Systems:   ROS completed; pertinent positive and negatives stated in subjective.    Vital signs:  Temp:  [97.4 °F (36.3 °C)-97.8 °F (36.6 °C)] 97.7 °F (36.5 °C)  Pulse:  [49-62] 49  Resp:  [18-20] 18  BP: (107-135)/(60-72) 135/72  SpO2:  [92 %-95 %] 95 %    Physical Exam:    General: No acute distress  Respiratory: Clear to auscultation bilaterally  Cardiovascular: S1, S2.  Abdomen: Soft  Neuro: No new focal deficits      Diagnostic Data:    Labs:  Recent Labs   Lab 24  0539 01/15/24  0811 24  0618   WBC 8.2 9.5 6.4   HGB 10.6* 11.7* 9.1*   MCV 95.0 95.6 93.4   .0 272.0 166.0       Recent Labs   Lab 24  0539 01/15/24  0811 24  0618   GLU 87 109* 107*   BUN 9 21 13   CREATSERUM 0.51* 0.74 0.52*   CA 9.0 9.3 8.6   ALB  --  3.1*  --     139 141   K 4.6 4.2 3.9    105 106   CO2 31.0 29.0 32.0   ALKPHO  --  118*  --    AST  --  27  --    ALT  --  31  --    BILT  --  0.4  --    TP  --  7.9  --        Estimated Creatinine Clearance: 178.2 mL/min (A) (based on SCr of 0.52 mg/dL (L)).    No results for input(s): \"TROP\", \"TROPHS\", \"CK\" in the last 168 hours.    No results for input(s): \"PTP\", \"INR\" in the last 168 hours.           Imaging: Imaging data reviewed in Epic.    Medications:    [START ON 2024] multivitamin  1 tablet Oral Daily    cetirizine  5 mg Oral Daily    losartan  50 mg Oral Daily    metoprolol succinate ER  50 mg Oral Daily    pantoprazole  40 mg Oral BID AC    lipase-protease-amylase (Lip-Prot-Amyl)  25,000 Units Oral TID CC    sertraline  100 mg Oral Daily    vancomycin  125 mg  Oral BID       Assessment & Plan:     #Esophagobronchial fistula   #Dysphagia   S/p EGD for fistula closure and bronchoscopy with stent removal on 1/17  Pain control, anti-emetics  Tolerating PO diet     #CAD s/p CABG - continue home meds  #C. Diff -> continue vanco taper  #GERD - PPI  #Depression - Zoloft      #Metastatic esophageal cancer s/p gastroesophagostomy 9/22  #Metastatic pancreatic cancer  Follows with Dr. Foster      #Moderate malnutrition    Dispo: as above.  Discussed with GI.  Has pain at suture site from previous CABG.  To see his surgeon.      Michael Welch MD    Supplementary Documentation:   D(C1+C3) + R(morphine)  Quality:    DVT Mechanical Prophylaxis:   SCDs, Early ambuation  DVT Pharmacologic Prophylaxis   Medication   None                Code Status: Full Code  Neumann: No urinary catheter in place  Neumann Duration (in days):   Central line:    SHUBHAM: 1/18/2024      Discharge is dependent on: clinical stability  At this point Mr. Cortez is expected to be discharge to: home    The 21st Century Cures Act makes medical notes like these available to patients in the interest of transparency. Please be advised this is a medical document. Medical documents are intended to carry relevant information, facts as evident, and the clinical opinion of the practitioner. The medical note is intended as peer to peer communication and may appear blunt or direct. It is written in medical language and may contain abbreviations or verbiage that are unfamiliar.     Dietitian Malnutrition Assessment        Evaluation for Malnutrition: Criteria for non-severe malnutrition diagnosis-         chronic illness related to   Wt loss 20% in 1 year., Energy intake less than 75% for greater than 1 month.       RD Malnutrition Care Plan: See RD nutrition assessment for additional recommendations.    Body Fat/Muscle Mass:          Physician Assessment    Patient has a diagnosis of moderate malnutrition

## 2024-01-18 NOTE — TELEPHONE ENCOUNTER
Cheryl Diggs RN from [employer], called to request LOV note and med list for pt. She is working with patient through [employer]. LOV note not signed yet, but faxed over med list.

## 2024-01-18 NOTE — PROGRESS NOTES
NURSING DISCHARGE NOTE    Discharged Home via Ambulatory.  Accompanied by Spouse  Belongings Taken by patient/family.  Pt is aaox4, discharge instructions given and verbalized understanding.

## 2024-01-19 ENCOUNTER — PATIENT OUTREACH (OUTPATIENT)
Dept: CASE MANAGEMENT | Age: 54
End: 2024-01-19

## 2024-01-19 ENCOUNTER — PATIENT MESSAGE (OUTPATIENT)
Dept: FAMILY MEDICINE CLINIC | Facility: CLINIC | Age: 54
End: 2024-01-19

## 2024-01-19 ENCOUNTER — TELEPHONE (OUTPATIENT)
Dept: FAMILY MEDICINE CLINIC | Facility: CLINIC | Age: 54
End: 2024-01-19

## 2024-01-19 DIAGNOSIS — K22.89 ESOPHAGEAL FISTULA: Primary | ICD-10-CM

## 2024-01-19 DIAGNOSIS — Z02.9 ENCOUNTERS FOR ADMINISTRATIVE PURPOSE: ICD-10-CM

## 2024-01-19 NOTE — TELEPHONE ENCOUNTER
From: Dontae Cortez Jr.  To: Adrian Horowitz  Sent: 1/19/2024 2:45 PM CST  Subject: Dontae Cortez     Did a nurse reach out you guys to fill this prescription

## 2024-01-19 NOTE — TELEPHONE ENCOUNTER
Called the patient for clarification. (Do not understand the request from below message.) Patient states that he is requesting a refill on the [place]. Advised patient that Dr. Foster refilled the [place] today. Patient verbalized understanding and stated that he does not have any further issues or concerns at this time.

## 2024-01-19 NOTE — PROGRESS NOTES
Initial Post Discharge Follow Up   Discharge Date: 1/18/24  Contact Date: 1/19/2024    Consent Verification:  Assessment Completed With: Patient  HIPAA Verified?  Yes    Discharge Dx:   Esophageal Fistula    General:   How have you been since your discharge from the hospital?  I'm feeling okay right now.  Coughing is controlled.  Do you have any pain since discharge?  Yes  Where: chest incision from wires.    Rating on pain scale 1-10, 10 being the worst pain you have ever experienced, what is current pain: 10  Alleviating factors: none  Aggravating factors: none  Is the pain manageable at home? Yes  How well was your pain managed while in the hospital?   On a scale of 1-5   1- Very Poor and 5- Very well   Very Well  When you were leaving the hospital were your discharge instructions reviewed with you? Yes  How well were your discharge instructions explained to you?   On a scale of 1-5   1- Very Poor and 5- Very well   Very Well  Do you have any questions about your discharge instructions?  No  Before leaving the hospital was your diagnoses explained to you? Yes  Do you have any questions about your diagnoses? No  Are you able to perform normal daily activities of living as you have prior to your hospital stay (dressing, bathing, ambulating to the bathroom, etc)? yes  (NCM) Was patient given a different diet per AVS? no      Medications:   Current Outpatient Medications   Medication Sig Dispense Refill    baclofen 10 MG Oral Tab Take 1 tablet (10 mg total) by mouth 3 (three) times daily as needed. 90 tablet 0    HYDROcodone-acetaminophen 5-325 MG Oral Tab Take 1-2 tablets by mouth every 8 (eight) hours as needed for Pain. 10 tablet 0    vancomycin 125 MG Oral Cap Take 1 capsule (125 mg total) by mouth in the morning and 1 capsule (125 mg total) before bedtime.      cetirizine 5 MG Oral Tab Take 1 tablet (5 mg total) by mouth daily.      sucralfate (CARAFATE) 1 g Oral Tab Take 1 tablet (1 g total) by mouth 3 (three)  times daily as needed. 60 tablet 3    losartan 50 MG Oral Tab Take 1 tablet (50 mg total) by mouth daily. 90 tablet 2    benzonatate 100 MG Oral Cap Take 1 capsule (100 mg total) by mouth 3 (three) times daily as needed for cough. 30 capsule 0    sodium chloride, hypertonic, 3 % Inhalation Nebu Soln Take 3 mL by nebulization every 8 (eight) hours. 360 mL 1    Omeprazole 40 MG Oral Capsule Delayed Release Take 1 capsule (40 mg total) by mouth 2 (two) times daily before meals. 90 capsule 3    sertraline 100 MG Oral Tab Take 1 tablet (100 mg total) by mouth daily. 90 tablet 1    Pancrelipase, Lip-Prot-Amyl, (CREON) 23956-44132 units Oral Cap DR Particles Take 2 capsules with meals and 1 capsule with snacks 240 capsule 0    metoprolol succinate ER 50 MG Oral Tablet 24 Hr TAKE 1 TABLET BY MOUTH EVERY DAY 90 tablet 1     Were there any changes to your current medication(s) noted on the AVS? Yes  If so, were these medication changes discussed with you prior to leaving the hospital? Yes  If a new medication was prescribed:  n/a  Let's go over your medications together to make sure we are not missing anything. Medications Reviewed  Are there any reasons that keep you from taking your medication as prescribed? No  Are you having any concerns with constipation? No  Did patient receive their flu shot (Sept-March)? Yes    Discharge medications reviewed/discussed/and reconciled against outpatient medications with patient.  Any changes or updates to medications sent to PCP.  Patient Acknowledged     Referrals/orders at D/C:  Referrals/orders placed at D/C? no    DME ordered at D/C? No      Discharge orders, AVS reviewed and discussed with patient. Any changes or updates to orders sent to PCP.  Patient Acknowledged      SDOH:   Transportation Needs: No Transportation Needs (1/17/2024)    Transportation Needs     Lack of Transportation: No     Financial Resource Strain: Low Risk  (12/14/2023)    Financial Resource Strain      Difficulty of Paying Living Expenses: Not very hard     Med Affordability: No         Follow up appointments:      Your appointments       Date & Time Appointment Department (Center)    Jan 22, 2024  8:00 AM CST CHEMO INFUSION CHEMOTHERAPY with PF TX RN1 [facility] in [place] ([facility])        Jan 22, 2024  8:30 AM CST Follow-Up Visit with Grecia Angelo APRN [facility] in General acute hospital)        Jan 22, 2024  9:00 AM CST PF Hematology Oncology Dietary Follow Up with PF HEM/ONC EDILMA STONER [facility] in [place] ([facility])    Your appointment is scheduled at the [facility] in [place]. The address is [address] [place] [address]. Please park at the Green [address] and enter through building A. Once you arrive, please register at the [facility]  on the second floor.                [facility] in [address] [address]  [phone] [facility] in [address] [address]  [phone]            TCC  Was TCC ordered: No    PCP (If no TCC appointment)  Does patient already have a PCP appointment scheduled? No  NCM Attempted to schedule PCP office HFU appointment with patient   If no appointment scheduled: Explain-pt declined, NCM sent TE to PCP    Specialist    Does the patient have any other follow up appointment(s) needing to be scheduled? Yes  If yes: NCM reviewed upcoming specialist appointment with patient: Yes  Does the patient need assistance scheduling appointment(s): No, pt states that he will schedule with specialists    Is there any reason as to why you cannot make your appointment(s)?  No     Needs post D/C:   Now that you are home, are there any needs or concerns you need addressed before your next visit with  your PCP?  (DME, meds, questions, etc.): No    Interventions by [facility]:   NCM reviewed discharge instructions and when to seek medical attention with the patient. He states that he is doing okay. His main complaint, which he brought up during hospitalization, is that his chest is hurting from the wires from his previous cardiac surgery. He states that he called cardiology office and was told they would call him back tomorrow. He rates pain at 10/10 but does not feel it warrants an ER visit. NC sent TE to PCP office and pt aware that he will receive a return call. He denied having any fever, n/v/c/d, sob, lightheadedness, HA or any other new or worsening symptoms. Triston today was 129/80. Med review completed. He denied having any questions or concerns at this time.     CCM referral placed:    No    BOOK BY DATE: 2/1/24

## 2024-01-19 NOTE — TELEPHONE ENCOUNTER
S/w patient for HFU. He is in a current TCM episode. He states that he has been trying to get a hold of his cardiologist Dr. Nichols (sp?) and was told he would receive a return call on Monday. He states that he has been having pain in his chest incision from the wires from past surgery. He reported this during hospitalization but it was not addressed. Nursing note from hospitalization, \"    Pt is aaox4, complains of chest pain along his old CABG incision site, 7-9/10, requesting Morphine at least every 2 hours with slight relief, monitored.   \" He rates pain at 10/10 but does not feel it warrants an ER visit. He is asking if PCP can prescribe pain medication. He also declined an HFU appt at this time. Please discuss with PCP and follow up with patient. Thank you.

## 2024-01-19 NOTE — PROGRESS NOTES
Dontae Cortez Jr. is a 53 year old male who presents for   Chief Complaint   Patient presents with    Follow - Up     F/u- Metaststic Esophageal cx, C-diff       HPI:   Patient's presenting for follow up from Hospital     Patient was in Hospital/ER/WIC: date(s)     Date of Admission: 1/8/2024  Date of Discharge:   1/13/2024        Discharge Disposition: Home or Self Care     Discharge Diagnosis:  Dysphagia  Metastatic Esophageal Cancer  Bronchoesophageal fistula  CAD s/p CABG       Patient reports overall improvement.     Medication: nothing new, swallowing is not better.     Patient denies any shortness of breath, denies chest pain and denies any recent fevers or chills.  Patient reports no urinary complaints and denies headaches or visual disturbances.   Patient denies any abdominal pain at this time. Patient has no new skin lesions.    Wt Readings from Last 6 Encounters:   01/17/24 171 lb (77.6 kg)   01/16/24 173 lb (78.5 kg)   01/15/24 171 lb (77.6 kg)   01/08/24 180 lb (81.6 kg)   12/29/23 180 lb (81.6 kg)   12/29/23 184 lb (83.5 kg)     Body mass index is 22.82 kg/m².     Cholesterol, Total (mg/dL)   Date Value   10/16/2022 108   02/24/2022 154   08/19/2019 148     CHOLESTEROL (mg/dL)   Date Value   08/15/2013 193   01/18/2013 231 (H)     HDL Cholesterol (mg/dL)   Date Value   02/24/2022 50   08/19/2019 49   09/01/2018 47     HDL CHOL (mg/dL)   Date Value   08/15/2013 30 (L)   01/18/2013 38 (L)     LDL Cholesterol (mg/dL)   Date Value   02/24/2022 80   08/19/2019 63   09/01/2018 51     LDL CHOLESTROL (mg/dL)   Date Value   08/15/2013 114   01/18/2013 150 (H)     AST (U/L)   Date Value   01/15/2024 27   01/08/2024 21   01/02/2024 14 (L)   09/23/2013 13 (L)   08/19/2013 32   08/19/2013 SPECIMEN HEMOLYZED (L)     ALT (U/L)   Date Value   01/15/2024 31   01/08/2024 23   01/02/2024 19   09/23/2013 29   08/19/2013 29   08/16/2013 24      Current Outpatient Medications   Medication Sig Dispense Refill     baclofen 10 MG Oral Tab Take 1 tablet (10 mg total) by mouth 3 (three) times daily as needed. 90 tablet 0    HYDROcodone-acetaminophen 5-325 MG Oral Tab Take 1-2 tablets by mouth every 8 (eight) hours as needed for Pain. 10 tablet 0    vancomycin 125 MG Oral Cap Take 1 capsule (125 mg total) by mouth in the morning and 1 capsule (125 mg total) before bedtime.      cetirizine 5 MG Oral Tab Take 1 tablet (5 mg total) by mouth daily.      sucralfate (CARAFATE) 1 g Oral Tab Take 1 tablet (1 g total) by mouth 3 (three) times daily as needed. 60 tablet 3    losartan 50 MG Oral Tab Take 1 tablet (50 mg total) by mouth daily. 90 tablet 2    benzonatate 100 MG Oral Cap Take 1 capsule (100 mg total) by mouth 3 (three) times daily as needed for cough. 30 capsule 0    sodium chloride, hypertonic, 3 % Inhalation Nebu Soln Take 3 mL by nebulization every 8 (eight) hours. 360 mL 1    Omeprazole 40 MG Oral Capsule Delayed Release Take 1 capsule (40 mg total) by mouth 2 (two) times daily before meals. 90 capsule 3    sertraline 100 MG Oral Tab Take 1 tablet (100 mg total) by mouth daily. 90 tablet 1    Pancrelipase, Lip-Prot-Amyl, (CREON) 73139-45084 units Oral Cap DR Particles Take 2 capsules with meals and 1 capsule with snacks 240 capsule 0    metoprolol succinate ER 50 MG Oral Tablet 24 Hr TAKE 1 TABLET BY MOUTH EVERY DAY 90 tablet 1      Past Medical History:   Diagnosis Date    Back problem     Belching 02/01/2022    Black stools 02/01/2022    Borderline diabetes     Dx in 8/2013 - HgA1C 6.2%    C. difficile diarrhea 10/23/2022    pt treated and without symptoms    Chest pain 02/01/2022    Coronary artery disease     On 8/16/13: CABG x 4 with LIMA to LAD and SVG to diagonal, OM and PDA    Decorative tattoo 01/01/2000    Depression     Disorder of liver     LIVER CA    Esophageal cancer (HCC)     completed chemo    Esophageal reflux     Essential hypertension     Exposure to medical diagnostic radiation     last tx 8/18/2022     Frequent urination 01/01/2013    Gastroparesis     Heartburn 02/01/2022    High blood pressure     High cholesterol 08/01/2013    Found when I had quadruple bypass    History of COVID-19 10/16/2022    asymptomatic - pt was dx during a hospitalization for another diagnosis. No continued symptoms    History of stomach ulcers     Hyperlipidemia     Hyperlipidemia LDL goal < 70     Indigestion 02/01/2022    Morbid obesity with BMI of 40.0-44.9, adult (HCC)     Muscle weakness     Nontoxic multinodular goiter     Dx in 8/2013: pt was told that imaging showed thyroid cysts per PCP    Pancreatic cancer (HCC)     last dose 12/4/2023 is scheduled for another round 12/27/23    Peripheral vascular disease (HCC)     pt denies    Personal history of antineoplastic chemotherapy     for esophageal cancer/completed    Personal history of antineoplastic chemotherapy 12/2023    pancreatic cancer    Problems with swallowing 02/01/2022    Pulmonary nodules     Dx in 8/2013: CT chest showed small bilateral fissural-based lung nodules less than 1 cm    S/P CABG x 4     On 8/16/13: CABG x 4 with LIMA to LAD and SVG to diagonal, OM and PDA    Shortness of breath     Vomiting 02/01/2022      Past Surgical History:   Procedure Laterality Date    APPENDECTOMY      APPENDECTOMY      ARTHROSCOPY OF JOINT UNLISTED      right shoulder    CABG      On 8/16/13: CABG x 4 with LIMA to LAD and SVG to diagonal, OM and PDA    CARDIAC CATH LAB      On 8/14/2013: cardiac cath showed 3-vessel disease    OTHER SURGICAL HISTORY      1.       Laparoscopic robotic-assisted esophagogastrectomy.      Family History   Problem Relation Age of Onset    Cancer Mother         breast and colon     Diabetes Neg       Social History:  Social History     Socioeconomic History    Marital status:     Number of children: 3   Occupational History    Occupation: works as  - on workman's comp   Tobacco Use    Smoking status: Former     Packs/day: 0.00      Years: 27.00     Additional pack years: 0.00     Total pack years: 0.00     Types: Cigarettes     Quit date: 8/15/2013     Years since quitting: 10.4    Smokeless tobacco: Never    Tobacco comments:     Quit smoking 2013   Vaping Use    Vaping Use: Never used   Substance and Sexual Activity    Alcohol use: Not Currently    Drug use: Never    Sexual activity: Not Currently     Partners: Female   Other Topics Concern    Caffeine Concern Yes    Stress Concern No    Weight Concern No    Special Diet No    Exercise No    Seat Belt No   Social History Narrative    Lives with life partner    Has 3 daughters - 1 lives closeby, 1 in [place], 1 in [place]     Social Determinants of Health     Financial Resource Strain: Low Risk  (12/14/2023)    Financial Resource Strain     Difficulty of Paying Living Expenses: Not very hard     Med Affordability: No   Food Insecurity: No Food Insecurity (1/17/2024)    Food Insecurity     Food Insecurity: Never true   Transportation Needs: No Transportation Needs (1/17/2024)    Transportation Needs     Lack of Transportation: No   Housing Stability: Low Risk  (1/17/2024)    Housing Stability     Housing Instability: No     Housing Instability Emergency: No           A comprehensive 10 point review of systems was completed.  Pertinent positives and negatives noted in the the HPI.    /64   Pulse 58   Temp 97.7 °F (36.5 °C)   Resp 12   Ht 6' 1\" (1.854 m)   Wt 173 lb (78.5 kg)   SpO2 94%   BMI 22.82 kg/m²   Body mass index is 22.82 kg/m².   Physical Exam  HENT:      Head: Normocephalic and atraumatic.      Nose: No congestion or rhinorrhea.   Cardiovascular:      Rate and Rhythm: Normal rate.      Heart sounds: No murmur heard.  Pulmonary:      Effort: Pulmonary effort is normal. No respiratory distress.   Abdominal:      General: Abdomen is flat. There is no distension.   Musculoskeletal:      Cervical back: No rigidity.   Skin:     Coloration: Skin is not jaundiced.   Neurological:       Mental Status: He is alert and oriented to person, place, and time.      Motor: Weakness present.             ASSESSMENT AND PLAN:   Dontae Cortez Jr. is a 53 year old male who presents for hospital/ER follow up, Hospital notes, consultant notes, and labs and imaging was reviewed with patient:   1. Chronic diarrhea  -will check for C Diff  - C. diff toxigenic PCR (OPT) [E]; Future    2. Hyperlipidemia with target low density lipoprotein (LDL) cholesterol less than 70 mg/dL  -stable, CPm    3. Malignant neoplasm of lower third of esophagus (HCC)  -per Med and surgical oncology    4. Subacute cough  -stable, CPM    5. Esophageal obstruction  -will have fistula treatment per Gi    6. Esophageal anastomotic leak  -as above. CPM    7. Primary hypertension  -stable, CPM.    Pt verbalized understanding and has no further questions at this time.  Over 55 minutes was spent with patient reviewing medication, reviewing labs, reviewed hospital admission and discharge reports and reviewed oncology report: deep ulceration, failed endoscoping stenting bronchitis and esophageal with persistent fistula and medical plan.    Greater than 50% of visit spent on education and counseling.  Office Follow up visit: 1-3 months,

## 2024-01-20 PROBLEM — F11.90 NARCOTIC DRUG USE: Status: ACTIVE | Noted: 2024-01-01

## 2024-01-20 PROBLEM — F11.90 NARCOTIC DRUG USE: Status: ACTIVE | Noted: 2024-01-20

## 2024-01-21 ENCOUNTER — PATIENT MESSAGE (OUTPATIENT)
Dept: FAMILY MEDICINE CLINIC | Facility: CLINIC | Age: 54
End: 2024-01-21

## 2024-01-22 ENCOUNTER — OFFICE VISIT (OUTPATIENT)
Dept: HEMATOLOGY/ONCOLOGY | Age: 54
End: 2024-01-22
Attending: INTERNAL MEDICINE
Payer: COMMERCIAL

## 2024-01-22 VITALS
BODY MASS INDEX: 22.78 KG/M2 | HEIGHT: 73.82 IN | OXYGEN SATURATION: 98 % | WEIGHT: 177.5 LBS | RESPIRATION RATE: 18 BRPM | TEMPERATURE: 97 F | DIASTOLIC BLOOD PRESSURE: 78 MMHG | HEART RATE: 70 BPM | SYSTOLIC BLOOD PRESSURE: 132 MMHG

## 2024-01-22 DIAGNOSIS — Z51.12 ENCOUNTER FOR ANTINEOPLASTIC IMMUNOTHERAPY: ICD-10-CM

## 2024-01-22 DIAGNOSIS — Z51.11 ENCOUNTER FOR CHEMOTHERAPY MANAGEMENT: ICD-10-CM

## 2024-01-22 DIAGNOSIS — C15.5 MALIGNANT NEOPLASM OF LOWER THIRD OF ESOPHAGUS (HCC): Primary | ICD-10-CM

## 2024-01-22 DIAGNOSIS — J86.0: ICD-10-CM

## 2024-01-22 DIAGNOSIS — C15.9 MALIGNANT NEOPLASM OF ESOPHAGUS, UNSPECIFIED LOCATION (HCC): ICD-10-CM

## 2024-01-22 LAB
ALBUMIN SERPL-MCNC: 2.6 G/DL (ref 3.4–5)
ALBUMIN/GLOB SERPL: 0.6 {RATIO} (ref 1–2)
ALP LIVER SERPL-CCNC: 123 U/L
ANION GAP SERPL CALC-SCNC: 6 MMOL/L (ref 0–18)
AST SERPL-CCNC: 25 U/L (ref 15–37)
BASOPHILS # BLD AUTO: 0.04 X10(3) UL (ref 0–0.2)
BASOPHILS NFR BLD AUTO: 0.6 %
BILIRUB SERPL-MCNC: 0.3 MG/DL (ref 0.1–2)
BUN BLD-MCNC: 13 MG/DL (ref 9–23)
CALCIUM BLD-MCNC: 8.6 MG/DL (ref 8.5–10.1)
CANCER AG19-9 SERPL-ACNC: 502 U/ML (ref ?–37)
CHLORIDE SERPL-SCNC: 107 MMOL/L (ref 98–112)
CO2 SERPL-SCNC: 27 MMOL/L (ref 21–32)
CREAT BLD-MCNC: 0.66 MG/DL
EGFRCR SERPLBLD CKD-EPI 2021: 111 ML/MIN/1.73M2 (ref 60–?)
EOSINOPHIL # BLD AUTO: 0.55 X10(3) UL (ref 0–0.7)
EOSINOPHIL NFR BLD AUTO: 7.7 %
ERYTHROCYTE [DISTWIDTH] IN BLOOD BY AUTOMATED COUNT: 14.4 %
GLOBULIN PLAS-MCNC: 4.4 G/DL (ref 2.8–4.4)
GLUCOSE BLD-MCNC: 161 MG/DL (ref 70–99)
HCT VFR BLD AUTO: 33.5 %
HGB BLD-MCNC: 10.8 G/DL
IMM GRANULOCYTES # BLD AUTO: 0.13 X10(3) UL (ref 0–1)
IMM GRANULOCYTES NFR BLD: 1.8 %
LYMPHOCYTES # BLD AUTO: 0.67 X10(3) UL (ref 1–4)
LYMPHOCYTES NFR BLD AUTO: 9.4 %
MCH RBC QN AUTO: 30.7 PG (ref 26–34)
MCHC RBC AUTO-ENTMCNC: 32.2 G/DL (ref 31–37)
MCV RBC AUTO: 95.2 FL
MONOCYTES # BLD AUTO: 0.61 X10(3) UL (ref 0.1–1)
MONOCYTES NFR BLD AUTO: 8.5 %
NEUTROPHILS # BLD AUTO: 5.15 X10 (3) UL (ref 1.5–7.7)
NEUTROPHILS # BLD AUTO: 5.15 X10(3) UL (ref 1.5–7.7)
NEUTROPHILS NFR BLD AUTO: 72 %
OSMOLALITY SERPL CALC.SUM OF ELEC: 294 MOSM/KG (ref 275–295)
PLATELET # BLD AUTO: 187 10(3)UL (ref 150–450)
POTASSIUM SERPL-SCNC: 3.8 MMOL/L (ref 3.5–5.1)
PROT SERPL-MCNC: 7 G/DL (ref 6.4–8.2)
RBC # BLD AUTO: 3.52 X10(6)UL
SODIUM SERPL-SCNC: 140 MMOL/L (ref 136–145)
WBC # BLD AUTO: 7.2 X10(3) UL (ref 4–11)

## 2024-01-22 PROCEDURE — 80053 COMPREHEN METABOLIC PANEL: CPT

## 2024-01-22 PROCEDURE — 86301 IMMUNOASSAY TUMOR CA 19-9: CPT

## 2024-01-22 PROCEDURE — 99215 OFFICE O/P EST HI 40 MIN: CPT | Performed by: NURSE PRACTITIONER

## 2024-01-22 PROCEDURE — 96417 CHEMO IV INFUS EACH ADDL SEQ: CPT

## 2024-01-22 PROCEDURE — 96413 CHEMO IV INFUSION 1 HR: CPT

## 2024-01-22 PROCEDURE — 85025 COMPLETE CBC W/AUTO DIFF WBC: CPT

## 2024-01-22 RX ORDER — HYDROCODONE BITARTRATE AND ACETAMINOPHEN 5; 325 MG/1; MG/1
1-2 TABLET ORAL EVERY 4 HOURS PRN
Qty: 90 TABLET | Refills: 0 | Status: SHIPPED | OUTPATIENT
Start: 2024-01-22

## 2024-01-22 RX ORDER — PROCHLORPERAZINE MALEATE 10 MG
10 TABLET ORAL EVERY 6 HOURS PRN
Qty: 30 TABLET | Refills: 3 | Status: SHIPPED | OUTPATIENT
Start: 2024-01-22

## 2024-01-22 RX ORDER — FLUOROURACIL 50 MG/ML
2400 INJECTION, SOLUTION INTRAVENOUS CONTINUOUS
Status: DISCONTINUED | OUTPATIENT
Start: 2024-01-22 | End: 2024-01-22

## 2024-01-22 RX ORDER — ONDANSETRON HYDROCHLORIDE 8 MG/1
8 TABLET, FILM COATED ORAL EVERY 8 HOURS PRN
Qty: 30 TABLET | Refills: 3 | Status: SHIPPED | OUTPATIENT
Start: 2024-01-22

## 2024-01-22 RX ORDER — FLUOROURACIL 50 MG/ML
2400 INJECTION, SOLUTION INTRAVENOUS CONTINUOUS
Status: CANCELLED | OUTPATIENT
Start: 2024-01-22

## 2024-01-22 NOTE — PROGRESS NOTES
Education Record    Learner:  Patient    Disease / Diagnosis: met esophageal     Barriers / Limitations:  None   Comments:    Method:  Discussion   Comments:    General Topics:  Plan of care reviewed   Comments:    Outcome:  Shows understanding   Comments:   Pt awaiting call from Dr Galvez, cardiology.   Has cough, bringing up blood tinged phlegm.   Eating and drinking ok.   Reports N/V.   Frequent chills.   Reviewed meds in detail. Will complete Vanco end of the month.   Pt reports he is going to get a new PCP.

## 2024-01-22 NOTE — TELEPHONE ENCOUNTER
From: Dontae Cortez Jr.  To: Adrian Horowitz  Sent: 1/21/2024 8:15 AM CST  Subject: Dontae Cortez     First of all, I didn't ask you for that. Prescription to filled a nurse called me and said. You were gonna refill blame her and And thanks for asking my precision went good on wednesday thanks for asking I think it's time to find a new doctor care a lot more about me. I will delete you and Monday I will find a new doctor thanks

## 2024-01-22 NOTE — PROGRESS NOTES
[facility] ANP Visit Note    Patient Name: Dontae Cortez Jr.   YOB: 1970   Medical Record Number: [id number]   Date of visit: 1/22/2024     Chief Complaint/Reason for Visit:  Chief Complaint   Patient presents with    Follow - Up        Oncologic history:     2018: Per report had a normal EGD and colonoscopy     June 2022: He met with GI due to new onset dysphagia which started about 1 month prior to his visit.  He had an esophagram with a focal abrupt narrowing with irregular margins in the distal one third of the esophagus extending to the GE junction.  He also had an episode of food impaction. He has also lost about 15 lb. He was a heavy smoker from age 14 to about 40 (1.5 PPD). Also with alcohol use currently. Mother with a history of breast and colon cancer.      EGD and EUS with Dr. Yadav on 6/7/2022: Severe esophagitis with a concerning mass extending 37-39 centimeters from the incisors.  Nonobstructive mass.  By EUS uT3N1 disease.  Pathology showed invasive moderate to poorly differentiated adenocarcinoma.  HER2 amplified by FISH     CT CAP done at [facility] on 6/16/22: Results not loaded to PACs yet.  CA 19-9 from the same day was 107.4.  CEA normal     PET/CT on 6/20/2022: Increased distal esophageal uptake with an SUV of 14.4.  Concern for shreya metastases.     Concurrent chemoradiation with carboplatin AUC2 plus paclitaxel 50 mg/m2  C1D1: 7/6/22: weight 280 :  137  C1D8: 7/13/22: weight 277  C1D15: 7/20/22: weight 278.   C1D22: 7/27/22  103. Weight 278   C1D29: 8/3/22:  49. Weight 275  C1D36: 8/10/22:  30.9. Weight 276     PET/CT on 9/6/22: CONCLUSION:  Improvement.  Abnormal elevated activity in the distal esophagus, but uptake has decreased compared to the prior.  Furthermore, there is no longer uptake identified within 2 upper abdominal lymph nodes, 1 has disappeared from view, another has decreased in size.      Surgery with Dr. Lu on  9/30/22: ypT1b pN0. Recovery has been complicated by an esophageal leak,      I met with him on 12/12/22. We discussed adjuvant opdivo for 1 year. His  was elevated to 48 and repeat was 64. CT CAP was recommended.      He was admitted on 12/25/22 for abdominal pain. He had a POEM procedure done. He was also noted to have a RLL consolidation. He was seen by pulmonary, based on imaging an outpatient PET/CT was recommended and a biopsy of the most FDG avid lesion would be considered. Noted to have CAD and an outpatient evaluation was recommended.   CTA Chest/A/P from 12/24/22: 1. No evidence of pulmonary embolism. 2. There has been interval esophagectomy with gastric pull-through.  No postoperative complications along the operative site noted. 3. The prior described focus of atelectasis along the right lung base is noted seen along the medial segment of the right middle lobe and has a consolidative type appearance, similar in configuration to the previous study and measuring 6.2 x 4.6 x 3.6 cm.  The appearance is concerning for potential metastasis.  Further assessment with PET/CT is recommended. 4. Diverticulosis of the sigmoid colon is again noted.  There has been interval placement of a short segment stent within the mid sigmoid colon as described above.  Please correlate with patient's history.   PET/CT on 1/4/23: 1. Abnormal uptake corresponding to consolidation at the right lung base.  This is likely inflammatory/infectious in nature.  The patient had a prior right hydropneumothorax requiring small bore chest tube placement.   2. Abnormal uptake corresponding to a small soft tissue focus in anterior abdominal wall.  This may be related to prior surgery.  Attention on follow-up imaging recommended. 3. No convincing metastatic findings in the chest, abdomen or pelvis. 4. Details as above  His case was reviewed in GI TB. No focal areas of concern. Given stable imaging, recommend proceed with immunotherapy and  repeat imaging after 4-6 cycles.      Adjuvant Opdivo: 1/19/2023  C1: 1/9/23:  198  C2: 1/23/23:  212  C3: 2/6/23:  284  C4: 2/20/23:  357     Tumor Board Review of Most recent pathology. Appears to be granulomatous disease. Malignancy very low on the differential. He should follow up with pulmonary medicine and infectious disease. We will continue routine surveillance imaging for his esophageal cancer. We will continue with immunotherapy as planned.      CT CAP on 2/28/23: 1. Stable postsurgical changes status post esophagectomy and gastric pull-through. 2. Nodules along the left major fissure appear mildly more prominent compared to the prior examination.  Continued attention on follow-up is recommended.  Additional right middle lobe nodule appears stable. 3. Bandlike opacity in the region of the right middle lobe appears improved compared to the prior exam and may represent postsurgical change versus atelectasis although neoplasm cannot be completely excluded.  Continued attention on follow-up is recommended.  A repeat PET-CT may be obtained to document stability. 4. No evidence of metastatic disease in the abdomen and pelvis.     C5: 3/6/23     PET/CT on 4/10/23: 1. Foci of hypermetabolism of the pancreatic head and tail segments with SUV max of 7.2 and 5.1, respectively, highly suspicious for metastases. 2. Subtle hypermetabolic focus of the posterior segment right hepatic lobe (SUV max 3.8), equivocal for variable physiologic uptake versus metastasis.  Liver MRI may be helpful for further characterization.   3. Suspected benign inflammatory uptake within the thorax, as above.      EGD/EUS on 4/27/23 with Dr. Ritchie: Pancreatic head mass positive for adenocarcinoma--Comparison to previous esophageal cancer shows similarity but IHC in non-specific. Her 2 IHC was 2+ on this specimen but FISH was negative.      Chemo + Herceptin + Immunotherapy  C1: 5/1/23: FOLFOX-Herceptin-Opdivo  C2:  5/15/23:  1454: FOLFOX-Herceptin-Opdivo  C3: 6/5/23: Switched to Xeloda, Oxaliplatin, Herceptin, Keytruda:  329  C4: 7/10/23: Delayed to the thrombocytopenia:  79 to 35 Will switch back to 5-FU  C5: 7/24/23:  21.5     PET/CT on 7/27/23: 1. Previously described hypermetabolic lesions in the pancreas and in the liver are no longer identified on this study and could represent resolved or treated metastases. 2. Mild activity near the proximal anastomosis at the gastric pull-through is not significantly changed. 3. There are no new suspicious findings      C6: 8/7/23:  15.1  C7: 8/28/23: Ca19-9 WE are holding Oxaliplatin d/t neuropathy.  13.9  --5FU + Herceptin + Immunotherapy alone--  C8: 9/11/23:  12.3 Signatera negative     Herceptin and Keytruda maintenance q3 weeks since Signatera was negative   C1: 9/25/23:  10.3  C2: 10/9/23: Ca19-9 15. 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 Diffuse ground-glass opacity in the left lung, which has intervally increased in comparison to 12/30/2023.  There is new FDG avid mediastinal lymphadenopathy, which may be reactive versus malignant.  Continued attention on follow-up is recommended.  Continued follow-up to resolution of the ground-glass opacities recommended. 2. New FDG avid hepatic and pancreatic lesions.  The uptake near the pancreatic uncinate process is similar to 4/10/2023, which had resolved on 7/27/2023 exam.  This is suspicious for recurrence of disease. Given these findings, we discussed restarting FOLFOX.      -admitted from 1/8/24-1/13/24 for persistent dysphagia and bronchesophageal stent.      -1/17/24: EGD with fistula closure with ASD occluder and removal of the bronchial stent.      History of Present Illness:     Dontae Cortez Jr. is a 53 year old patient of Dr. Foster with the above oncologic history who is being treated for metastatic esophageal cancer. Presents today for APN evaluation with possible chemotherapy.     He underwent fistula closure and reports some improvement in cough, though it is still present. He is now able to drink a whole glass of fluid without coughing. Still having a productive cough with some blood tinged sputum.     Continues to report significant chest pain which is felt to be related to failure of previous sternotomy wires. Currently using baclofen TID and hydrocodone BID, but only hydrocodone is providing temporary relief. He is waiting to hear back from Dr Galvez to get a outpatient follow up appointment to discuss further.     Reviewed available chart notes, labs, and imaging.    History:     Past medical, surgical, social and family history reviewed with the patient and updated as appropriate in the chart.    Allergies:  No Known Allergies    Medications:    Current Outpatient Medications:     ondansetron (ZOFRAN) 8 MG tablet, Take 1 tablet (8 mg total) by mouth every 8 (eight) hours as needed for Nausea., Disp: 30 tablet, Rfl: 3    prochlorperazine (COMPAZINE) 10 mg tablet, Take 1 tablet (10 mg total) by mouth every 6 (six) hours as needed for Nausea., Disp: 30 tablet, Rfl: 3    HYDROcodone-acetaminophen 5-325 MG Oral Tab, Take 1-2 tablets by mouth every 4 (four) hours as needed for Pain., Disp: 90 tablet, Rfl: 0    vancomycin 125 MG Oral Cap, Take 1 capsule (125 mg total) by mouth in the morning and 1 capsule (125 mg total) before bedtime., Disp: , Rfl:     cetirizine 5 MG Oral Tab, Take 1 tablet (5 mg total) by mouth daily., Disp: , Rfl:     sucralfate (CARAFATE) 1 g Oral Tab, Take 1 tablet (1 g total) by mouth 3 (three) times daily as needed., Disp: 60  tablet, Rfl: 3    losartan 50 MG Oral Tab, Take 1 tablet (50 mg total) by mouth daily., Disp: 90 tablet, Rfl: 2    benzonatate 100 MG Oral Cap, Take 1 capsule (100 mg total) by mouth 3 (three) times daily as needed for cough., Disp: 30 capsule, Rfl: 0    Omeprazole 40 MG Oral Capsule Delayed Release, Take 1 capsule (40 mg total) by mouth 2 (two) times daily before meals., Disp: 90 capsule, Rfl: 3    sertraline 100 MG Oral Tab, Take 1 tablet (100 mg total) by mouth daily., Disp: 90 tablet, Rfl: 1    Pancrelipase, Lip-Prot-Amyl, (CREON) 12958-77770 units Oral Cap DR Particles, Take 2 capsules with meals and 1 capsule with snacks, Disp: 240 capsule, Rfl: 0    metoprolol succinate ER 50 MG Oral Tablet 24 Hr, TAKE 1 TABLET BY MOUTH EVERY DAY, Disp: 90 tablet, Rfl: 1    Review of Systems:  A comprehensive 14 point review of systems was completed.  Pertinent positives and negatives noted in the HPI.    Performance Status: ECOG 1-2    Vital Signs:   1/22/2024  7:54 AM   Oncology Vitals    Height 6' 1.819\"    Height 188 cm    Weight 177 lb 8 oz    Weight 80.513 kg    BSA (m2) 2.06 m2    BMI 22.9 kg/m2    /78    Pulse 70    Resp 18    Temp 96.9 °F (36.1 °C)    SpO2 98 %    Pain Score 8      Physical Examination:  General: alert and oriented x 3, not in acute distress.  HEENT: Anicteric, conjunctivae and sclerae clear, no oropharyngeal lesion/thrush, mucous membranes are moist.   Chest: occasional inspiratory audible wheeze noted during exam, clears with cough. No wheezing in lung fields, clear to auscultation, respirations unlabored.  Heart: Regular rate and rhythm, normal S1/2. No murmur.   Extremities: No edema.  Neurological: Grossly intact.   Skin: Warm, Dry, No erythema, No rash  Psych/Depression: mood and affect are appropriate.     Labs:     Recent Results (from the past 72 hour(s))   Comp Metabolic Panel (14)    Collection Time: 01/22/24  8:14 AM   Result Value Ref Range    Glucose 161 (H) 70 - 99 mg/dL     Sodium 140 136 - 145 mmol/L    Potassium 3.8 3.5 - 5.1 mmol/L    Chloride 107 98 - 112 mmol/L    CO2 27.0 21.0 - 32.0 mmol/L    Anion Gap 6 0 - 18 mmol/L    BUN 13 9 - 23 mg/dL    Creatinine 0.66 (L) 0.70 - 1.30 mg/dL    Calcium, Total 8.6 8.5 - 10.1 mg/dL    Calculated Osmolality 294 275 - 295 mOsm/kg    eGFR-Cr 111 >=60 mL/min/1.73m2    AST 25 15 - 37 U/L    ALT      Alkaline Phosphatase 123 (H) 45 - 117 U/L    Bilirubin, Total 0.3 0.1 - 2.0 mg/dL    Total Protein 7.0 6.4 - 8.2 g/dL    Albumin 2.6 (L) 3.4 - 5.0 g/dL    Globulin  4.4 2.8 - 4.4 g/dL    A/G Ratio 0.6 (L) 1.0 - 2.0    Patient Fasting for CMP? Patient not present    CBC W/ DIFFERENTIAL    Collection Time: 01/22/24  8:14 AM   Result Value Ref Range    WBC 7.2 4.0 - 11.0 x10(3) uL    RBC 3.52 (L) 4.30 - 5.70 x10(6)uL    HGB 10.8 (L) 13.0 - 17.5 g/dL    HCT 33.5 (L) 39.0 - 53.0 %    .0 150.0 - 450.0 10(3)uL    MCV 95.2 80.0 - 100.0 fL    MCH 30.7 26.0 - 34.0 pg    MCHC 32.2 31.0 - 37.0 g/dL    RDW 14.4 %    Neutrophil Absolute Prelim 5.15 1.50 - 7.70 x10 (3) uL    Neutrophil Absolute 5.15 1.50 - 7.70 x10(3) uL    Lymphocyte Absolute 0.67 (L) 1.00 - 4.00 x10(3) uL    Monocyte Absolute 0.61 0.10 - 1.00 x10(3) uL    Eosinophil Absolute 0.55 0.00 - 0.70 x10(3) uL    Basophil Absolute 0.04 0.00 - 0.20 x10(3) uL    Immature Granulocyte Absolute 0.13 0.00 - 1.00 x10(3) uL    Neutrophil % 72.0 %    Lymphocyte % 9.4 %    Monocyte % 8.5 %    Eosinophil % 7.7 %    Basophil % 0.6 %    Immature Granulocyte % 1.8 %       Impression/Plan    Metastatic HER2 + esophageal cancer: progression noted while on maintenance pembrolizumab + trastuzumab, therefore plan was to resume FOLFOX today. Patient would like to delay starting chemotherapy as he does not want this to interfere with getting sternotomy hardware repaired. Will proceed with trastuzumab + pembrolizumab only today at the request of the patient. Once he knows plan with Dr Galvez, he will let us know.      Dislodged bypass hardware: presumed to be cause of chest pain. Awaiting call back from Dr Galvez's office. Discontinue baclofen, as this is not providing relief, refill provided on hydrocodone, can take more frequently as needed for relief.     Cough: improved since undergoing fistula repair.     Wheeze: appears to be upper airway, as no wheezing appreciated in lung fields. Clears with cough. Consider course of expectorant.     Planned Follow Up: 2 weeks for MD + labs + chemo    Risk Level: HIGH - metastatic esophageal cancer receiving systemic therapy requiring close monitoring     The 21st Century Cures Act makes medical notes like these available to patients in the interest of transparency. Please be advised this is a medical document. Medical documents are intended to carry relevant information, facts as evident, and the clinical opinion of the practitioner. The medical note is intended as peer to peer communication and may appear blunt or direct. It is written in medical language and may contain abbreviations or verbiage that are unfamiliar.     Electronically Signed by:    Grecia Angelo DNP, NP-C  Nurse Practitioner  [facility]

## 2024-01-22 NOTE — PROGRESS NOTES
Pt here for C14D1 Drug(s)keytruda/herceptin/folfox.  Arrives Ambulating independently, accompanied by Self     Patient was evaluated today by LD.    Oral medications included in this regimen:  no    Patient confirms comprehension of cancer treatment schedule:  yes    Pregnancy screening:  Not applicable    Modifications in dose or schedule:  Yes, only keytruda and herceptin given    Medications appearance and physical integrity checked by RN: yes.    Chemotherapy IV pump settings verified by 2 RNs:  No due to targeted therapy IV administration.  Frequency of blood return and site check throughout administration: Prior to administration, Prior to each drug, and At completion of therapy     Infusion/treatment outcome:  patient tolerated treatment without incident    Education Record    Learner:  Patient  Barriers / Limitations:  None  Method:  Brief focused and Discussion  Education / instructions given:  schedule  Outcome:  Shows understanding    Discharged Home, Ambulating independently, accompanied by:Self    Patient/family verbalized understanding of future appointments: by [employer] messaging

## 2024-01-23 NOTE — PAT NURSING NOTE
PAT nursing note: denies taking ASA, blood thinners, diabetic or weight loss medications; no PPM, ICD or spinal cord stimulator; patient verbalized understanding of all instructions; T&S, PT/PTT on admit; h/o C-diff (12/30/23)-no active symptoms.      Pre-Surgical Instructions for 1/25/24      Visitor Instructions    -Visitors are welcome to accompany you the day of surgery.  -If admitted, visiting hours are 7:00 am to 8:00 pm.        Pre-Op Instructions    Scheduled Surgery: You are scheduled for Cardiac Surgery with Dr. Alexis      Date of Procedure: Thursday, 01/25/24      Time of Arrival: 06:30 am     -This is going to be a tentative time of arrival for surgery.   -We will call you the buisness day prior to your surgery in the late afternoon to reconfirm your arrival.   -Please check your voicemail for a message.      Diet Instructions:     -Do not eat or drink after 10:00 pm. This includes water, gum, candy and food.      Medication Instructions:     CONTINUE to take your prescribed medications as directed EXCEPT:    -The morning of surgery only take your Metoprolol with a sip of water.  -You can take your prescribed Norco as needed for pain with a sip of water.        Medications to Stop:     -The last dose of Losartan will be 1/24 morning dose.    -The last dose of vitamins, supplements, and NSAID's (ex. Ibuprofen, Excederin, Aleve, Diclofenac, and any gels having steroids) will be 1/23.         Skin Prep:     -Shower with Dial Soap the morning of your surgery (or any antibacterial soap).       Admit/Discharge Status:     -Your recovery will be approximately 2 hours after the completion of your surgery prior to your discharge to go home.  -If sedation is given, you WILL NOT be able to drive home. You will need a responsible adult  to drive you home.      Discharge Teaching:     -Your nurse will give you specific instructions before discharge.  -Any questions, please call the surgeon's office.    Additional  Instructions:     -Bring insurance card(s) and picture ID.  -Leave all valuables at home, including jewelry.  -Wear comfortable clothing.  -No contacts, wear glasses.  -You'll receive arrival time 1 business day prior to scheduled surgery.    -Park in the [place] parking garage at [facility].    -Check in at the [facility] reception desk in the [facility].   -Our  will be there to check you in for your procedure.   -Complimentary  parking is available starting at 6:00 am.      If you are scheduled to arrive early for 5:30 am, the [facility] reception desk does not open till 5:30 am. It may be dark, but you are in the correct location.     We are open M-F from 8am- 5pm. We are closed on holidays and weekends. We can be reached at [phone].       Thank you

## 2024-01-23 NOTE — PROGRESS NOTES
Oncology Nutrition F/U Consultation    Patient Name: Dontae Cortez Jr.  YOB: 1970  Medical Record Number: [id number]   Account Number: [id number]  Dietitian: Kayla Hagen RD, LDN    Date of visit: 1/22/2024    Diet Rx: high protein/calorie, post-esophagectomy diet    Pertinent Dx/PMH: Esophageal cancer; pancreatic (head) cancer     Past Medical History:   Diagnosis Date    Back problem     Belching 02/01/2022    Black stools 02/01/2022    Borderline diabetes     Dx in 8/2013 - HgA1C 6.2%    C. difficile diarrhea 10/23/2022    pt treated and without symptoms    Chest pain 02/01/2022    Coronary artery disease     On 8/16/13: CABG x 4 with LIMA to LAD and SVG to diagonal, OM and PDA    Decorative tattoo 01/01/2000    Depression     Difficult intubation     h/o esophagectomy for CA; developed esophagobronchial vistula    Disorder of liver     LIVER CA    Esophageal cancer (HCC)     completed chemo    Esophageal reflux     Essential hypertension     Exposure to medical diagnostic radiation     last tx 8/18/2022    Frequent urination 01/01/2013    Gastroparesis     Heartburn 02/01/2022    High blood pressure     High cholesterol 08/01/2013    Found when I had quadruple bypass    History of COVID-19 10/16/2022    asymptomatic - pt was dx during a hospitalization for another diagnosis. No continued symptoms    History of stomach ulcers     Hyperlipidemia     Hyperlipidemia LDL goal < 70     Indigestion 02/01/2022    Morbid obesity with BMI of 40.0-44.9, adult (HCC)     Muscle weakness     Nontoxic multinodular goiter     Dx in 8/2013: pt was told that imaging showed thyroid cysts per PCP    Pancreatic cancer (HCC)     last dose 12/4/2023 is scheduled for another round 12/27/23    Peripheral vascular disease (HCC)     pt denies    Personal history of antineoplastic chemotherapy     for esophageal cancer/completed    Personal history of antineoplastic chemotherapy 12/2023    pancreatic cancer     Problems with swallowing 02/01/2022    Pulmonary nodules     Dx in 8/2013: CT chest showed small bilateral fissural-based lung nodules less than 1 cm    S/P CABG x 4     On 8/16/13: CABG x 4 with LIMA to LAD and SVG to diagonal, OM and PDA    Shortness of breath     Vomiting 02/01/2022       TX: FOLFOX; switched to Herceptin/Immunotherapy maintenance on 9/25/23 due to negative signatera; q2 week 5-FU + Herceptin + q6 week Keytruda. Oxaliplatin was held on C7 due to neuropathy      1. Neoadjuvant chemoRT with carbo-taxol: 7/6/22-8/10/22  2. laparoscopic robotic-assisted esophagogastrectomy with feeding jejunostomy tube placement on 9/30/2022. Complications w/ esophageal leak  3. G-POEM (EGD and pylorectomy) 12/27/22     Other pertinent subjective/objective information: 1/17/24: EGD with fistula closure with ASD occluder and removal of the bronchial stent ; 1/8/24-1/13/24 for persistent dysphagia and bronchesophageal stent. Plan is for an ASD occluder and removal of the bronchial stent as an outpatient     1/15/24: Pt meeting definition for SEVERE MALNUTRITION in the context of acute illness as evidenced by 7% unplanned wt loss x 4 weeks and po intake meeting less than 50% estimated nutrition needs.     Other pertinent subjective/objective information: diet/sx hx obtained    Pertinent Meds:    Current Outpatient Medications:     ondansetron (ZOFRAN) 8 MG tablet, Take 1 tablet (8 mg total) by mouth every 8 (eight) hours as needed for Nausea., Disp: 30 tablet, Rfl: 3    prochlorperazine (COMPAZINE) 10 mg tablet, Take 1 tablet (10 mg total) by mouth every 6 (six) hours as needed for Nausea., Disp: 30 tablet, Rfl: 3    HYDROcodone-acetaminophen 5-325 MG Oral Tab, Take 1-2 tablets by mouth every 4 (four) hours as needed for Pain., Disp: 90 tablet, Rfl: 0    vancomycin 125 MG Oral Cap, Take 1 capsule (125 mg total) by mouth in the morning and 1 capsule (125 mg total) before bedtime., Disp: , Rfl:     cetirizine 5 MG Oral  Tab, Take 1 tablet (5 mg total) by mouth daily., Disp: , Rfl:     sucralfate (CARAFATE) 1 g Oral Tab, Take 1 tablet (1 g total) by mouth 3 (three) times daily as needed., Disp: 60 tablet, Rfl: 3    losartan 50 MG Oral Tab, Take 1 tablet (50 mg total) by mouth daily., Disp: 90 tablet, Rfl: 2    benzonatate 100 MG Oral Cap, Take 1 capsule (100 mg total) by mouth 3 (three) times daily as needed for cough., Disp: 30 capsule, Rfl: 0    Omeprazole 40 MG Oral Capsule Delayed Release, Take 1 capsule (40 mg total) by mouth 2 (two) times daily before meals., Disp: 90 capsule, Rfl: 3    sertraline 100 MG Oral Tab, Take 1 tablet (100 mg total) by mouth daily., Disp: 90 tablet, Rfl: 1    Pancrelipase, Lip-Prot-Amyl, (CREON) 26829-31600 units Oral Cap DR Particles, Take 2 capsules with meals and 1 capsule with snacks, Disp: 240 capsule, Rfl: 0    metoprolol succinate ER 50 MG Oral Tablet 24 Hr, TAKE 1 TABLET BY MOUTH EVERY DAY (Patient taking differently: Take 1 tablet (50 mg total) by mouth daily.), Disp: 90 tablet, Rfl: 1    Pertinent Labs: noted    Height: 6'1.82\"           IBW: 190 +/- 10%    WT HX:   Wt Readings from Last 9 Encounters:   01/23/24 80.3 kg (177 lb)   01/22/24 80.5 kg (177 lb 8 oz)   01/17/24 77.6 kg (171 lb)   01/16/24 78.5 kg (173 lb)   01/15/24 77.6 kg (171 lb)   01/08/24 81.6 kg (180 lb)   12/29/23 81.6 kg (180 lb)   12/29/23 83.5 kg (184 lb)   12/26/23 83.5 kg (184 lb)       Estimated Nutrition Needs: 30-35 kcals/kg = 9494-1747 KCALS/d; 1.5 gms protein/kg = 120 gms/d    Assessment/Plan: Pt noted concern about resuming chemotherapy as awaiting f/u from cardiologist as per \"feeling as if wires are poking out (after his recent procedure).\" Pt noted, \"I gained 6 pounds.\" Pt feels he is eating and drinking OK.     RD offered support/encouragement and will continue to follow.     The 21st Century Cures Act makes medical notes like these available to patients in the interest of transparency. Please be advised  this is a medical document. Medical documents are intended to carry relevant information, facts as evident, and the clinical opinion of the practitioner. The medical note is intended as peer to peer communication and may appear blunt or direct. It is written in medical language and may contain abbreviations or verbiage that are unfamiliar.

## 2024-01-23 NOTE — TELEPHONE ENCOUNTER
Noted, patient requested pain rx and we directed patient to get from his oncologist, now he wants to find another PCP because we didn't refill his pain rx. Not sure why the extreme response from patient.

## 2024-01-23 NOTE — TELEPHONE ENCOUNTER
Spoke to pt to clarify and he said that he got a call from someone about the pain med refill and it was from a [phone] number?  He does not know who it was but he felt that our office was \"picking\" on him for needing pain medication?  Explained that reviewed the documentation and not sure who would of called him but that we were just trying to clarify with him as we saw that Dr Foster had just refilled his pain meds on 1/19/24.   Pt feels that because he has been a patient for so long and in  the past the provider has picked up the phone and asked him \"how we was doing\" maybe after a particular procedure but now that he has cancer he feels that no one cares.      Discussed and apologized if we made him in anyway feel like he is describing but that we were just trying to understand what he was trying to convey in his last ZetrOZ message.  He is not sure what he will do if he will change PCP or not.  He wants to just leave things as is for now.  Will update provider

## 2024-01-25 ENCOUNTER — ANESTHESIA EVENT (OUTPATIENT)
Dept: CARDIAC SURGERY | Facility: HOSPITAL | Age: 54
End: 2024-01-25
Payer: COMMERCIAL

## 2024-01-25 ENCOUNTER — ANESTHESIA (OUTPATIENT)
Dept: CARDIAC SURGERY | Facility: HOSPITAL | Age: 54
End: 2024-01-25
Payer: COMMERCIAL

## 2024-01-25 ENCOUNTER — HOSPITAL ENCOUNTER (OUTPATIENT)
Facility: HOSPITAL | Age: 54
Setting detail: HOSPITAL OUTPATIENT SURGERY
Discharge: HOME | End: 2024-01-25
Attending: THORACIC SURGERY (CARDIOTHORACIC VASCULAR SURGERY) | Admitting: THORACIC SURGERY (CARDIOTHORACIC VASCULAR SURGERY)
Payer: COMMERCIAL

## 2024-01-25 ENCOUNTER — TELEPHONE (OUTPATIENT)
Dept: FAMILY MEDICINE CLINIC | Facility: CLINIC | Age: 54
End: 2024-01-25

## 2024-01-25 VITALS
RESPIRATION RATE: 16 BRPM | BODY MASS INDEX: 22.72 KG/M2 | SYSTOLIC BLOOD PRESSURE: 140 MMHG | DIASTOLIC BLOOD PRESSURE: 62 MMHG | WEIGHT: 177 LBS | HEIGHT: 74 IN | HEART RATE: 51 BPM | OXYGEN SATURATION: 95 % | TEMPERATURE: 97 F

## 2024-01-25 LAB
ANTIBODY SCREEN: NEGATIVE
APTT PPP: 32.7 SECONDS (ref 23.3–35.6)
INR BLD: 1.26 (ref 0.8–1.2)
PROTHROMBIN TIME: 15.9 SECONDS (ref 11.6–14.8)
RH BLOOD TYPE: POSITIVE

## 2024-01-25 PROCEDURE — 86901 BLOOD TYPING SEROLOGIC RH(D): CPT | Performed by: THORACIC SURGERY (CARDIOTHORACIC VASCULAR SURGERY)

## 2024-01-25 PROCEDURE — 86850 RBC ANTIBODY SCREEN: CPT | Performed by: THORACIC SURGERY (CARDIOTHORACIC VASCULAR SURGERY)

## 2024-01-25 PROCEDURE — 86920 COMPATIBILITY TEST SPIN: CPT

## 2024-01-25 PROCEDURE — 85730 THROMBOPLASTIN TIME PARTIAL: CPT | Performed by: THORACIC SURGERY (CARDIOTHORACIC VASCULAR SURGERY)

## 2024-01-25 PROCEDURE — 85610 PROTHROMBIN TIME: CPT | Performed by: THORACIC SURGERY (CARDIOTHORACIC VASCULAR SURGERY)

## 2024-01-25 PROCEDURE — 86900 BLOOD TYPING SEROLOGIC ABO: CPT | Performed by: THORACIC SURGERY (CARDIOTHORACIC VASCULAR SURGERY)

## 2024-01-25 PROCEDURE — 0PC00ZZ EXTIRPATION OF MATTER FROM STERNUM, OPEN APPROACH: ICD-10-PCS | Performed by: THORACIC SURGERY (CARDIOTHORACIC VASCULAR SURGERY)

## 2024-01-25 RX ORDER — BUPIVACAINE HYDROCHLORIDE 5 MG/ML
INJECTION, SOLUTION EPIDURAL; INTRACAUDAL AS NEEDED
Status: DISCONTINUED | OUTPATIENT
Start: 2024-01-25 | End: 2024-01-25 | Stop reason: HOSPADM

## 2024-01-25 RX ORDER — HYDROCODONE BITARTRATE AND ACETAMINOPHEN 5; 325 MG/1; MG/1
1 TABLET ORAL ONCE AS NEEDED
Status: COMPLETED | OUTPATIENT
Start: 2024-01-25 | End: 2024-01-25

## 2024-01-25 RX ORDER — KETOROLAC TROMETHAMINE 30 MG/ML
INJECTION, SOLUTION INTRAMUSCULAR; INTRAVENOUS AS NEEDED
Status: DISCONTINUED | OUTPATIENT
Start: 2024-01-25 | End: 2024-01-25 | Stop reason: SURG

## 2024-01-25 RX ORDER — ROCURONIUM BROMIDE 10 MG/ML
INJECTION, SOLUTION INTRAVENOUS AS NEEDED
Status: DISCONTINUED | OUTPATIENT
Start: 2024-01-25 | End: 2024-01-25 | Stop reason: SURG

## 2024-01-25 RX ORDER — KETAMINE HYDROCHLORIDE 50 MG/ML
INJECTION, SOLUTION INTRAMUSCULAR; INTRAVENOUS AS NEEDED
Status: DISCONTINUED | OUTPATIENT
Start: 2024-01-25 | End: 2024-01-25 | Stop reason: SURG

## 2024-01-25 RX ORDER — HYDROMORPHONE HYDROCHLORIDE 1 MG/ML
0.2 INJECTION, SOLUTION INTRAMUSCULAR; INTRAVENOUS; SUBCUTANEOUS EVERY 5 MIN PRN
Status: DISCONTINUED | OUTPATIENT
Start: 2024-01-25 | End: 2024-01-25

## 2024-01-25 RX ORDER — HYDROMORPHONE HYDROCHLORIDE 1 MG/ML
0.4 INJECTION, SOLUTION INTRAMUSCULAR; INTRAVENOUS; SUBCUTANEOUS EVERY 5 MIN PRN
Status: DISCONTINUED | OUTPATIENT
Start: 2024-01-25 | End: 2024-01-25

## 2024-01-25 RX ORDER — SODIUM CHLORIDE, SODIUM LACTATE, POTASSIUM CHLORIDE, CALCIUM CHLORIDE 600; 310; 30; 20 MG/100ML; MG/100ML; MG/100ML; MG/100ML
INJECTION, SOLUTION INTRAVENOUS CONTINUOUS
Status: DISCONTINUED | OUTPATIENT
Start: 2024-01-25 | End: 2024-01-25

## 2024-01-25 RX ORDER — HYDROCODONE BITARTRATE AND ACETAMINOPHEN 5; 325 MG/1; MG/1
2 TABLET ORAL ONCE AS NEEDED
Status: COMPLETED | OUTPATIENT
Start: 2024-01-25 | End: 2024-01-25

## 2024-01-25 RX ORDER — PROCHLORPERAZINE EDISYLATE 5 MG/ML
5 INJECTION INTRAMUSCULAR; INTRAVENOUS EVERY 8 HOURS PRN
Status: DISCONTINUED | OUTPATIENT
Start: 2024-01-25 | End: 2024-01-25

## 2024-01-25 RX ORDER — ONDANSETRON 2 MG/ML
INJECTION INTRAMUSCULAR; INTRAVENOUS AS NEEDED
Status: DISCONTINUED | OUTPATIENT
Start: 2024-01-25 | End: 2024-01-25 | Stop reason: SURG

## 2024-01-25 RX ORDER — NEOSTIGMINE METHYLSULFATE 1 MG/ML
INJECTION, SOLUTION INTRAVENOUS AS NEEDED
Status: DISCONTINUED | OUTPATIENT
Start: 2024-01-25 | End: 2024-01-25 | Stop reason: SURG

## 2024-01-25 RX ORDER — NALOXONE HYDROCHLORIDE 0.4 MG/ML
0.08 INJECTION, SOLUTION INTRAMUSCULAR; INTRAVENOUS; SUBCUTANEOUS AS NEEDED
Status: DISCONTINUED | OUTPATIENT
Start: 2024-01-25 | End: 2024-01-25

## 2024-01-25 RX ORDER — DEXAMETHASONE SODIUM PHOSPHATE 4 MG/ML
VIAL (ML) INJECTION AS NEEDED
Status: DISCONTINUED | OUTPATIENT
Start: 2024-01-25 | End: 2024-01-25 | Stop reason: SURG

## 2024-01-25 RX ORDER — ACETAMINOPHEN 500 MG
1000 TABLET ORAL ONCE AS NEEDED
Status: COMPLETED | OUTPATIENT
Start: 2024-01-25 | End: 2024-01-25

## 2024-01-25 RX ORDER — PROCHLORPERAZINE EDISYLATE 5 MG/ML
INJECTION INTRAMUSCULAR; INTRAVENOUS
Status: COMPLETED
Start: 2024-01-25 | End: 2024-01-25

## 2024-01-25 RX ORDER — LIDOCAINE HYDROCHLORIDE 10 MG/ML
INJECTION, SOLUTION EPIDURAL; INFILTRATION; INTRACAUDAL; PERINEURAL AS NEEDED
Status: DISCONTINUED | OUTPATIENT
Start: 2024-01-25 | End: 2024-01-25 | Stop reason: SURG

## 2024-01-25 RX ORDER — MIDAZOLAM HYDROCHLORIDE 1 MG/ML
INJECTION INTRAMUSCULAR; INTRAVENOUS AS NEEDED
Status: DISCONTINUED | OUTPATIENT
Start: 2024-01-25 | End: 2024-01-25 | Stop reason: SURG

## 2024-01-25 RX ORDER — ONDANSETRON 2 MG/ML
4 INJECTION INTRAMUSCULAR; INTRAVENOUS EVERY 6 HOURS PRN
Status: DISCONTINUED | OUTPATIENT
Start: 2024-01-25 | End: 2024-01-25

## 2024-01-25 RX ORDER — ALBUTEROL SULFATE 2.5 MG/3ML
2.5 SOLUTION RESPIRATORY (INHALATION) AS NEEDED
Status: DISCONTINUED | OUTPATIENT
Start: 2024-01-25 | End: 2024-01-25

## 2024-01-25 RX ORDER — CEFAZOLIN SODIUM/WATER 2 G/20 ML
2 SYRINGE (ML) INTRAVENOUS
Status: COMPLETED | OUTPATIENT
Start: 2024-01-26 | End: 2024-01-25

## 2024-01-25 RX ORDER — GLYCOPYRROLATE 0.2 MG/ML
INJECTION, SOLUTION INTRAMUSCULAR; INTRAVENOUS AS NEEDED
Status: DISCONTINUED | OUTPATIENT
Start: 2024-01-25 | End: 2024-01-25 | Stop reason: SURG

## 2024-01-25 RX ORDER — SODIUM CHLORIDE, SODIUM LACTATE, POTASSIUM CHLORIDE, CALCIUM CHLORIDE 600; 310; 30; 20 MG/100ML; MG/100ML; MG/100ML; MG/100ML
INJECTION, SOLUTION INTRAVENOUS CONTINUOUS PRN
Status: DISCONTINUED | OUTPATIENT
Start: 2024-01-25 | End: 2024-01-25 | Stop reason: SURG

## 2024-01-25 RX ORDER — HYDROMORPHONE HYDROCHLORIDE 1 MG/ML
0.6 INJECTION, SOLUTION INTRAMUSCULAR; INTRAVENOUS; SUBCUTANEOUS EVERY 5 MIN PRN
Status: DISCONTINUED | OUTPATIENT
Start: 2024-01-25 | End: 2024-01-25

## 2024-01-25 RX ORDER — HYDROMORPHONE HYDROCHLORIDE 1 MG/ML
INJECTION, SOLUTION INTRAMUSCULAR; INTRAVENOUS; SUBCUTANEOUS
Status: COMPLETED
Start: 2024-01-25 | End: 2024-01-25

## 2024-01-25 RX ORDER — EPHEDRINE SULFATE 50 MG/ML
INJECTION INTRAVENOUS AS NEEDED
Status: DISCONTINUED | OUTPATIENT
Start: 2024-01-25 | End: 2024-01-25 | Stop reason: SURG

## 2024-01-25 RX ADMIN — DEXAMETHASONE SODIUM PHOSPHATE 4 MG: 4 MG/ML VIAL (ML) INJECTION at 12:54:00

## 2024-01-25 RX ADMIN — MIDAZOLAM HYDROCHLORIDE 2 MG: 1 INJECTION INTRAMUSCULAR; INTRAVENOUS at 12:37:00

## 2024-01-25 RX ADMIN — SODIUM CHLORIDE, SODIUM LACTATE, POTASSIUM CHLORIDE, CALCIUM CHLORIDE: 600; 310; 30; 20 INJECTION, SOLUTION INTRAVENOUS at 12:34:00

## 2024-01-25 RX ADMIN — CEFAZOLIN SODIUM/WATER 1 G: 2 G/20 ML SYRINGE (ML) INTRAVENOUS at 12:50:00

## 2024-01-25 RX ADMIN — LIDOCAINE HYDROCHLORIDE 100 MG: 10 INJECTION, SOLUTION EPIDURAL; INFILTRATION; INTRACAUDAL; PERINEURAL at 12:39:00

## 2024-01-25 RX ADMIN — KETOROLAC TROMETHAMINE 30 MG: 30 INJECTION, SOLUTION INTRAMUSCULAR; INTRAVENOUS at 13:11:00

## 2024-01-25 RX ADMIN — NEOSTIGMINE METHYLSULFATE 2.5 MG: 1 INJECTION, SOLUTION INTRAVENOUS at 13:11:00

## 2024-01-25 RX ADMIN — EPHEDRINE SULFATE 2.5 MG: 50 INJECTION INTRAVENOUS at 12:50:00

## 2024-01-25 RX ADMIN — ONDANSETRON 4 MG: 2 INJECTION INTRAMUSCULAR; INTRAVENOUS at 13:11:00

## 2024-01-25 RX ADMIN — GLYCOPYRROLATE 0.4 MG: 0.2 INJECTION, SOLUTION INTRAMUSCULAR; INTRAVENOUS at 13:11:00

## 2024-01-25 RX ADMIN — KETAMINE HYDROCHLORIDE 50 MG: 50 INJECTION, SOLUTION INTRAMUSCULAR; INTRAVENOUS at 12:50:00

## 2024-01-25 RX ADMIN — ROCURONIUM BROMIDE 25 MG: 10 INJECTION, SOLUTION INTRAVENOUS at 12:40:00

## 2024-01-25 RX ADMIN — SODIUM CHLORIDE, SODIUM LACTATE, POTASSIUM CHLORIDE, CALCIUM CHLORIDE: 600; 310; 30; 20 INJECTION, SOLUTION INTRAVENOUS at 13:35:00

## 2024-01-25 NOTE — ANESTHESIA PROCEDURE NOTES
Peripheral IV  Date/Time: 1/25/2024 12:45 PM  Inserted by: Fátima Gallagher MD    Placement  Needle size: 20 G  Laterality: left  Location: arm  Local anesthetic: none  Site prep: alcohol  Technique: anatomical landmarks  Attempts: 1

## 2024-01-25 NOTE — ANESTHESIA PROCEDURE NOTES
Airway  Date/Time: 1/25/2024 12:45 PM  Urgency: elective    Airway not difficult    General Information and Staff    Patient location during procedure: OR  Anesthesiologist: Fátima Gallagher MD  Performed: anesthesiologist   Performed by: Fátima Gallagher MD  Authorized by: Fátima Gallagher MD      Indications and Patient Condition  Indications for airway management: anesthesia  Spontaneous Ventilation: absent  Sedation level: deep  Preoxygenated: yes  Patient position: sniffing  Mask difficulty assessment: 1 - vent by mask    Final Airway Details  Final airway type: endotracheal airway      Successful airway: ETT  Cuffed: yes   Successful intubation technique: direct laryngoscopy  Facilitating devices/methods: intubating stylet  Endotracheal tube insertion site: oral  Blade: Sowmya  Blade size: #4  ETT size (mm): 8.0    Cormack-Lehane Classification: grade I - full view of glottis  Placement verified by: capnometry   Cuff volume (mL): 6  Measured from: lips  ETT to lips (cm): 22  Number of attempts at approach: 1  Number of other approaches attempted: 0

## 2024-01-25 NOTE — DISCHARGE INSTRUCTIONS
Ok to shower Monday, if steri strips fall off ok to remove otherwise leave on until post op visit, may drive Monday if not taking pain medication.

## 2024-01-25 NOTE — ANESTHESIA PREPROCEDURE EVALUATION
PRE-OP EVALUATION    Patient Name: Dontae Cortez Jr.    Admit Diagnosis: sternal wires protruding    Pre-op Diagnosis: sternal wires protruding    REMOVAL OF STERNAL WIRES    Anesthesia Procedure: REMOVAL OF STERNAL WIRES    Surgeon(s) and Role:     * Vasquez Alexis MD - Primary    Pre-op vitals reviewed.  Temp: 96.5 °F (35.8 °C)  Pulse: 50  Resp: 15  BP: 169/80  SpO2: 99 %  Body mass index is 22.73 kg/m².    Current medications reviewed.  Hospital Medications:   mupirocin (Bactroban) 2% nasal ointment 1 Application  1 Application Nasal Once    [START ON 1/26/2024] ceFAZolin (Ancef) 2 g in 20mL IV syringe premix  2 g Intravenous On Call    [COMPLETED] mupirocin (Bactroban) 2% nasal ointment        [COMPLETED] pembrolizumab (Keytruda) 400 mg in sodium chloride 0.9% 116 mL IVPB  400 mg Intravenous Once    [COMPLETED] trastuzumab-qyyp (Trazimera) 336 mg in sodium chloride 0.9% 266 mL infusion  4 mg/kg (Treatment Plan Recorded) Intravenous Once       Outpatient Medications:     Medications Prior to Admission   Medication Sig Dispense Refill Last Dose    ondansetron (ZOFRAN) 8 MG tablet Take 1 tablet (8 mg total) by mouth every 8 (eight) hours as needed for Nausea. 30 tablet 3 1/22/2024    prochlorperazine (COMPAZINE) 10 mg tablet Take 1 tablet (10 mg total) by mouth every 6 (six) hours as needed for Nausea. 30 tablet 3 1/22/2024    HYDROcodone-acetaminophen 5-325 MG Oral Tab Take 1-2 tablets by mouth every 4 (four) hours as needed for Pain. 90 tablet 0 1/25/2024    vancomycin 125 MG Oral Cap Take 1 capsule (125 mg total) by mouth in the morning and 1 capsule (125 mg total) before bedtime.   1/24/2024    cetirizine 5 MG Oral Tab Take 1 tablet (5 mg total) by mouth daily.   1/24/2024    sucralfate (CARAFATE) 1 g Oral Tab Take 1 tablet (1 g total) by mouth 3 (three) times daily as needed. 60 tablet 3 1/24/2024    losartan 50 MG Oral Tab Take 1 tablet (50 mg total) by mouth daily. 90 tablet 2 1/24/2024    benzonatate  100 MG Oral Cap Take 1 capsule (100 mg total) by mouth 3 (three) times daily as needed for cough. 30 capsule 0 1/24/2024    Omeprazole 40 MG Oral Capsule Delayed Release Take 1 capsule (40 mg total) by mouth 2 (two) times daily before meals. 90 capsule 3 1/24/2024    sertraline 100 MG Oral Tab Take 1 tablet (100 mg total) by mouth daily. 90 tablet 1 1/24/2024    Pancrelipase, Lip-Prot-Amyl, (CREON) 24256-61525 units Oral Cap DR Particles Take 2 capsules with meals and 1 capsule with snacks 240 capsule 0 1/24/2024    metoprolol succinate ER 50 MG Oral Tablet 24 Hr TAKE 1 TABLET BY MOUTH EVERY DAY (Patient taking differently: Take 1 tablet (50 mg total) by mouth daily.) 90 tablet 1 1/25/2024       Allergies: Patient has no known allergies.      Anesthesia Evaluation    Patient summary reviewed.    Anesthetic Complications  (-) history of anesthetic complications         GI/Hepatic/Renal      (+) GERD          (+) liver disease                 Cardiovascular  Comment: TTE:  Conclusions:   1. Left ventricle: The cavity size was normal. Wall thickness was mildly      increased. Systolic function was normal. The estimated ejection fraction      was 55-60%, by visual assessment. Wall motion is normal; there are no      regional wall motion abnormalities. Left ventricular diastolic function      parameters were normal. The Global Longitudinal Strain (GLS) was -21.30%.   2. Left atrium: The atrium was normal in size.   3. Right ventricle: The cavity size was normal. Systolic function was      normal.   4. Pericardium, extracardiac: There was no pericardial effusion.   *                   (+) hypertension     (+) CAD    (+) CABG/stent                            Endo/Other    Negative endo/other ROS.                              Pulmonary               (+) shortness of breath            Neuro/Psych      (+) depression                        #Suspected aspiration PNA, staphylococcal aureus pneumonia  #Dyspnea on exertion with  cough  #Recent COVID-19 infection,   #C diff colitis, recurrent with diarrhea  #COVID 12/10/23 with persistent cough/ bronchitis  #Metastatic esophageal adenocarcinoma s/p gastroesophagostomy 9/2022 c/b post op anastomotic leak   #Pancreatic mass 4/23- s/p stent insertion that   #H/o CAD s/p CABG  #Hypertension  #HLD  #Recent alpha hemolytic strep bacteremia 12/2023     #Depression               Past Surgical History:   Procedure Laterality Date    APPENDECTOMY      APPENDECTOMY      ARTHROSCOPY OF JOINT UNLISTED      right shoulder    CABG      On 8/16/13: CABG x 4 with LIMA to LAD and SVG to diagonal, OM and PDA    CARDIAC CATH LAB      On 8/14/2013: cardiac cath showed 3-vessel disease    OTHER SURGICAL HISTORY      1.       Laparoscopic robotic-assisted esophagogastrectomy.     Social History     Socioeconomic History    Marital status:     Number of children: 3   Occupational History    Occupation: works as  - on workman's comp   Tobacco Use    Smoking status: Former     Packs/day: 0.00     Years: 27.00     Additional pack years: 0.00     Total pack years: 0.00     Types: Cigarettes     Quit date: 8/15/2013     Years since quitting: 10.4    Smokeless tobacco: Never    Tobacco comments:     Quit smoking 2013   Vaping Use    Vaping Use: Never used   Substance and Sexual Activity    Alcohol use: Not Currently    Drug use: Never    Sexual activity: Not Currently     Partners: Female   Other Topics Concern    Caffeine Concern Yes    Stress Concern No    Weight Concern No    Special Diet No    Exercise No    Seat Belt No     History   Drug Use Unknown     Available pre-op labs reviewed.  Lab Results   Component Value Date    WBC 7.2 01/22/2024    RBC 3.52 (L) 01/22/2024    HGB 10.8 (L) 01/22/2024    HCT 33.5 (L) 01/22/2024    MCV 95.2 01/22/2024    MCH 30.7 01/22/2024    MCHC 32.2 01/22/2024    RDW 14.4 01/22/2024    .0 01/22/2024     Lab Results   Component Value Date     01/22/2024     K 3.8 01/22/2024     01/22/2024    CO2 27.0 01/22/2024    BUN 13 01/22/2024    CREATSERUM 0.66 (L) 01/22/2024     (H) 01/22/2024    CA 8.6 01/22/2024            Airway      Mallampati: I  Mouth opening: >3 FB  TM distance: > 6 cm  Neck ROM: full Cardiovascular      Rhythm: regular  Rate: normal     Dental             Pulmonary      Breath sounds clear to auscultation bilaterally.               Other findings              ASA: 3   Plan: general  NPO status verified and Patient does not meet NPO guidelines.  Patient has taken beta blockers in last 24 hours.  Post-procedure pain management plan discussed with surgeon and patient.    Comment: I spoke with the patient and discussed the risks of general anesthesia, which include allergic reaction, nausea, vomiting, dental injury, sore throat, awareness, hypotension, as well as more serious cardiac and pulmonary complications. The patient understands and consents to receiving general anesthesia for this procedure.    Plan/risks discussed with: patient and spouse  Use of blood product(s) discussed with: patient    Consented to blood products.          Present on Admission:  **None**

## 2024-01-25 NOTE — ANESTHESIA POSTPROCEDURE EVALUATION
[facility] [place]    Dontae Cortez  Patient Status:  Hospital Outpatient Surgery   Age/Gender 53 year old male MRN [id number]   Location [facility] POST ANESTHESIA CARE UNIT Attending Vasquez Alexis MD   Hosp Day # 0 PCP Adrian Horowitz MD       Anesthesia Post-op Note    REMOVAL OF STERNAL WIRES    Procedure Summary       Date: 01/25/24 [facility]    Anesthesia Start: 1234 Anesthesia Stop: 1335    Procedure: REMOVAL OF STERNAL WIRES (Chest) Diagnosis: (sternal wires protruding)    Surgeons: Vasquez Alexis MD Anesthesiologist: Fátima Gallagher MD    Anesthesia Type: general ASA Status: 3            Anesthesia Type: general    Vitals Value Taken Time   /74 01/25/24 1344   Temp 97.1 °F (36.2 °C) 01/25/24 1329   Pulse 52 01/25/24 1356   Resp 16 01/25/24 1356   SpO2 95 % 01/25/24 1356   Vitals shown include unfiled device data.    Patient Location: PACU    Anesthesia Type: general    Airway Patency: patent and extubated    Postop Pain Control: adequate    Mental Status: preanesthetic baseline    Nausea/Vomiting: none    Cardiopulmonary/Hydration status: stable euvolemic    Complications: no apparent anesthesia related complications    Postop vital signs: stable    Dental Exam: Unchanged from Preop    Patient to be discharged from PACU when criteria met.

## 2024-01-26 ENCOUNTER — HOSPITAL ENCOUNTER (OUTPATIENT)
Dept: GENERAL RADIOLOGY | Age: 54
Discharge: HOME | End: 2024-01-26
Attending: INTERNAL MEDICINE
Payer: COMMERCIAL

## 2024-01-26 DIAGNOSIS — R05.9 COUGH, UNSPECIFIED TYPE: ICD-10-CM

## 2024-01-26 LAB
BLOOD TYPE BARCODE: 5100
UNIT VOLUME: 350 ML

## 2024-01-26 PROCEDURE — 71045 X-RAY EXAM CHEST 1 VIEW: CPT | Performed by: INTERNAL MEDICINE

## 2024-01-26 NOTE — OPERATIVE REPORT
[facility]    PATIENT'S NAME: ALEX RODRIGUEZ JR.   ATTENDING PHYSICIAN: Vasquez Alexis MD   OPERATING PHYSICIAN: Vasquez Alexis MD   PATIENT ACCOUNT#:   [id number]    LOCATION:  [facility] 8 EDWP 10  MEDICAL RECORD #:   [id number]       YOB: 1970  ADMISSION DATE:       01/25/2024      OPERATION DATE:  01/25/2024    OPERATIVE REPORT    PREOPERATIVE DIAGNOSIS:  Sternal wires.   POSTOPERATIVE DIAGNOSIS:  Sternal wires.    PROCEDURE:  Removal of painful sternal wires.     ASSISTANT:  Freddy Camacho PA-C     INDICATIONS:  This patient is a super nice fellow who underwent coronary bypass surgery here a number of years ago with Dr. Love.  The patient has lost quite a bit of weight, and now his wires are rather protruding and painful.  The sternum is well healed.  Wire removal is indicated.      OPERATIVE TECHNIQUE:  Light general anesthesia was induced.  The chest was prepped and draped.  Two roughly 1-3/4-inch incisions were made, and all 7 sternal wires were removed.  Wounds were irrigated with antibiotic solution and closed in layers.  Marcaine was infiltrated in the wounds for postoperative pain control.  Sterile dressings were applied.  The patient was taken to the ICU.  The patient's wife was updated by me by telephone.    Dictated By Vasqeuz Alexis MD  d: 01/25/2024 13:43:29  t: 01/25/2024 16:30:21  Job [id number]  BF/

## 2024-01-26 NOTE — TELEPHONE ENCOUNTER
Called patient and left VM that I am here for him if he needs anything and apologized for any miscommunication that may have transpired as it was not our attention to feel unsupported during this difficult time.

## 2024-01-29 ENCOUNTER — APPOINTMENT (OUTPATIENT)
Dept: GENERAL RADIOLOGY | Facility: HOSPITAL | Age: 54
End: 2024-01-29
Attending: EMERGENCY MEDICINE
Payer: COMMERCIAL

## 2024-01-29 ENCOUNTER — HOSPITAL ENCOUNTER (INPATIENT)
Facility: HOSPITAL | Age: 54
LOS: 1 days | Discharge: LEFT BEFORE DISCHARGE | End: 2024-01-30
Attending: EMERGENCY MEDICINE | Admitting: INTERNAL MEDICINE
Payer: COMMERCIAL

## 2024-01-29 ENCOUNTER — APPOINTMENT (OUTPATIENT)
Dept: CT IMAGING | Facility: HOSPITAL | Age: 54
End: 2024-01-29
Attending: NURSE PRACTITIONER
Payer: COMMERCIAL

## 2024-01-29 DIAGNOSIS — Z85.01 HISTORY OF ESOPHAGEAL CANCER: ICD-10-CM

## 2024-01-29 DIAGNOSIS — R13.10 DYSPHAGIA, UNSPECIFIED TYPE: Primary | ICD-10-CM

## 2024-01-29 LAB
ADENOVIRUS PCR:: NOT DETECTED
ALBUMIN SERPL-MCNC: 2.7 G/DL (ref 3.4–5)
ALBUMIN/GLOB SERPL: 0.7 {RATIO} (ref 1–2)
ALP LIVER SERPL-CCNC: 119 U/L
ANION GAP SERPL CALC-SCNC: 3 MMOL/L (ref 0–18)
AST SERPL-CCNC: 30 U/L (ref 15–37)
ATRIAL RATE: 58 BPM
B PARAPERT DNA SPEC QL NAA+PROBE: NOT DETECTED
B PERT DNA SPEC QL NAA+PROBE: NOT DETECTED
BASOPHILS # BLD AUTO: 0.04 X10(3) UL (ref 0–0.2)
BASOPHILS NFR BLD AUTO: 0.6 %
BILIRUB SERPL-MCNC: 0.2 MG/DL (ref 0.1–2)
BUN BLD-MCNC: 15 MG/DL (ref 9–23)
C PNEUM DNA SPEC QL NAA+PROBE: NOT DETECTED
CALCIUM BLD-MCNC: 8.8 MG/DL (ref 8.5–10.1)
CHLORIDE SERPL-SCNC: 103 MMOL/L (ref 98–112)
CO2 SERPL-SCNC: 33 MMOL/L (ref 21–32)
CORONAVIRUS 229E PCR:: NOT DETECTED
CORONAVIRUS HKU1 PCR:: NOT DETECTED
CORONAVIRUS NL63 PCR:: NOT DETECTED
CORONAVIRUS OC43 PCR:: NOT DETECTED
CREAT BLD-MCNC: 0.55 MG/DL
EGFRCR SERPLBLD CKD-EPI 2021: 118 ML/MIN/1.73M2 (ref 60–?)
EOSINOPHIL # BLD AUTO: 0.52 X10(3) UL (ref 0–0.7)
EOSINOPHIL NFR BLD AUTO: 7.6 %
ERYTHROCYTE [DISTWIDTH] IN BLOOD BY AUTOMATED COUNT: 14.4 %
FLUAV + FLUBV RNA SPEC NAA+PROBE: NEGATIVE
FLUAV + FLUBV RNA SPEC NAA+PROBE: NEGATIVE
FLUAV RNA SPEC QL NAA+PROBE: NOT DETECTED
FLUBV RNA SPEC QL NAA+PROBE: NOT DETECTED
GLOBULIN PLAS-MCNC: 4.1 G/DL (ref 2.8–4.4)
GLUCOSE BLD-MCNC: 91 MG/DL (ref 70–99)
HCT VFR BLD AUTO: 32.5 %
HGB BLD-MCNC: 10 G/DL
IMM GRANULOCYTES # BLD AUTO: 0.14 X10(3) UL (ref 0–1)
IMM GRANULOCYTES NFR BLD: 2 %
LYMPHOCYTES # BLD AUTO: 0.77 X10(3) UL (ref 1–4)
LYMPHOCYTES NFR BLD AUTO: 11.2 %
MCH RBC QN AUTO: 29.6 PG (ref 26–34)
MCHC RBC AUTO-ENTMCNC: 30.8 G/DL (ref 31–37)
MCV RBC AUTO: 96.2 FL
METAPNEUMOVIRUS PCR:: NOT DETECTED
MONOCYTES # BLD AUTO: 0.64 X10(3) UL (ref 0.1–1)
MONOCYTES NFR BLD AUTO: 9.3 %
MYCOPLASMA PNEUMONIA PCR:: NOT DETECTED
NEUTROPHILS # BLD AUTO: 4.77 X10 (3) UL (ref 1.5–7.7)
NEUTROPHILS # BLD AUTO: 4.77 X10(3) UL (ref 1.5–7.7)
NEUTROPHILS NFR BLD AUTO: 69.3 %
OSMOLALITY SERPL CALC.SUM OF ELEC: 288 MOSM/KG (ref 275–295)
P AXIS: 33 DEGREES
P-R INTERVAL: 150 MS
PARAINFLUENZA 1 PCR:: NOT DETECTED
PARAINFLUENZA 2 PCR:: NOT DETECTED
PARAINFLUENZA 3 PCR:: NOT DETECTED
PARAINFLUENZA 4 PCR:: NOT DETECTED
PLATELET # BLD AUTO: 209 10(3)UL (ref 150–450)
POTASSIUM SERPL-SCNC: 4.2 MMOL/L (ref 3.5–5.1)
PROCALCITONIN SERPL-MCNC: 0.09 NG/ML (ref ?–0.16)
PROT SERPL-MCNC: 6.8 G/DL (ref 6.4–8.2)
Q-T INTERVAL: 404 MS
QRS DURATION: 84 MS
QTC CALCULATION (BEZET): 396 MS
R AXIS: 24 DEGREES
RBC # BLD AUTO: 3.38 X10(6)UL
RHINOVIRUS/ENTERO PCR:: NOT DETECTED
RSV RNA SPEC NAA+PROBE: NEGATIVE
RSV RNA SPEC QL NAA+PROBE: NOT DETECTED
SARS-COV-2 RNA NPH QL NAA+NON-PROBE: NOT DETECTED
SARS-COV-2 RNA RESP QL NAA+PROBE: NOT DETECTED
SODIUM SERPL-SCNC: 139 MMOL/L (ref 136–145)
T AXIS: 66 DEGREES
VENTRICULAR RATE: 58 BPM
WBC # BLD AUTO: 6.9 X10(3) UL (ref 4–11)

## 2024-01-29 PROCEDURE — 71260 CT THORAX DX C+: CPT | Performed by: NURSE PRACTITIONER

## 2024-01-29 PROCEDURE — 71046 X-RAY EXAM CHEST 2 VIEWS: CPT | Performed by: EMERGENCY MEDICINE

## 2024-01-29 PROCEDURE — 99223 1ST HOSP IP/OBS HIGH 75: CPT | Performed by: INTERNAL MEDICINE

## 2024-01-29 RX ORDER — ONDANSETRON 2 MG/ML
4 INJECTION INTRAMUSCULAR; INTRAVENOUS EVERY 4 HOURS PRN
Status: DISCONTINUED | OUTPATIENT
Start: 2024-01-29 | End: 2024-01-29

## 2024-01-29 RX ORDER — SUCRALFATE 1 G/1
1 TABLET ORAL 3 TIMES DAILY PRN
Status: DISCONTINUED | OUTPATIENT
Start: 2024-01-29 | End: 2024-01-30

## 2024-01-29 RX ORDER — BISACODYL 10 MG
10 SUPPOSITORY, RECTAL RECTAL
Status: DISCONTINUED | OUTPATIENT
Start: 2024-01-29 | End: 2024-01-30

## 2024-01-29 RX ORDER — ONDANSETRON 2 MG/ML
4 INJECTION INTRAMUSCULAR; INTRAVENOUS EVERY 6 HOURS PRN
Status: DISCONTINUED | OUTPATIENT
Start: 2024-01-29 | End: 2024-01-30

## 2024-01-29 RX ORDER — HYDROCODONE BITARTRATE AND ACETAMINOPHEN 5; 325 MG/1; MG/1
2 TABLET ORAL EVERY 4 HOURS PRN
Status: DISCONTINUED | OUTPATIENT
Start: 2024-01-29 | End: 2024-01-30

## 2024-01-29 RX ORDER — BACLOFEN 10 MG/1
10 TABLET ORAL 3 TIMES DAILY PRN
COMMUNITY
End: 2024-02-05 | Stop reason: ALTCHOICE

## 2024-01-29 RX ORDER — PANTOPRAZOLE SODIUM 40 MG/1
40 TABLET, DELAYED RELEASE ORAL
Status: DISCONTINUED | OUTPATIENT
Start: 2024-01-29 | End: 2024-01-30

## 2024-01-29 RX ORDER — ENOXAPARIN SODIUM 100 MG/ML
40 INJECTION SUBCUTANEOUS DAILY
Status: DISCONTINUED | OUTPATIENT
Start: 2024-01-29 | End: 2024-01-30

## 2024-01-29 RX ORDER — MORPHINE SULFATE 4 MG/ML
4 INJECTION, SOLUTION INTRAMUSCULAR; INTRAVENOUS ONCE
Status: COMPLETED | OUTPATIENT
Start: 2024-01-29 | End: 2024-01-29

## 2024-01-29 RX ORDER — SERTRALINE HYDROCHLORIDE 100 MG/1
100 TABLET, FILM COATED ORAL DAILY
Status: DISCONTINUED | OUTPATIENT
Start: 2024-01-29 | End: 2024-01-30

## 2024-01-29 RX ORDER — POLYETHYLENE GLYCOL 3350 17 G/17G
17 POWDER, FOR SOLUTION ORAL DAILY PRN
Status: DISCONTINUED | OUTPATIENT
Start: 2024-01-29 | End: 2024-01-30

## 2024-01-29 RX ORDER — BENZONATATE 100 MG/1
200 CAPSULE ORAL 3 TIMES DAILY PRN
Status: DISCONTINUED | OUTPATIENT
Start: 2024-01-29 | End: 2024-01-30

## 2024-01-29 RX ORDER — CETIRIZINE HYDROCHLORIDE 5 MG/1
5 TABLET ORAL DAILY
Status: DISCONTINUED | OUTPATIENT
Start: 2024-01-29 | End: 2024-01-30

## 2024-01-29 RX ORDER — SODIUM CHLORIDE 9 MG/ML
INJECTION, SOLUTION INTRAVENOUS CONTINUOUS
Status: DISCONTINUED | OUTPATIENT
Start: 2024-01-29 | End: 2024-01-29

## 2024-01-29 RX ORDER — HYDROCODONE BITARTRATE AND ACETAMINOPHEN 5; 325 MG/1; MG/1
1 TABLET ORAL EVERY 4 HOURS PRN
Status: DISCONTINUED | OUTPATIENT
Start: 2024-01-29 | End: 2024-01-30

## 2024-01-29 RX ORDER — PROCHLORPERAZINE EDISYLATE 5 MG/ML
5 INJECTION INTRAMUSCULAR; INTRAVENOUS EVERY 8 HOURS PRN
Status: DISCONTINUED | OUTPATIENT
Start: 2024-01-29 | End: 2024-01-30

## 2024-01-29 RX ORDER — MORPHINE SULFATE 4 MG/ML
4 INJECTION, SOLUTION INTRAMUSCULAR; INTRAVENOUS EVERY 2 HOUR PRN
Status: DISCONTINUED | OUTPATIENT
Start: 2024-01-29 | End: 2024-01-30

## 2024-01-29 RX ORDER — SODIUM CHLORIDE 9 MG/ML
INJECTION, SOLUTION INTRAVENOUS CONTINUOUS
Status: DISCONTINUED | OUTPATIENT
Start: 2024-01-29 | End: 2024-01-30

## 2024-01-29 RX ORDER — ENEMA 19; 7 G/133ML; G/133ML
1 ENEMA RECTAL ONCE AS NEEDED
Status: DISCONTINUED | OUTPATIENT
Start: 2024-01-29 | End: 2024-01-30

## 2024-01-29 RX ORDER — MORPHINE SULFATE 4 MG/ML
4 INJECTION, SOLUTION INTRAMUSCULAR; INTRAVENOUS EVERY 30 MIN PRN
Status: DISCONTINUED | OUTPATIENT
Start: 2024-01-29 | End: 2024-01-29

## 2024-01-29 RX ORDER — LOSARTAN POTASSIUM 50 MG/1
50 TABLET ORAL DAILY
Status: DISCONTINUED | OUTPATIENT
Start: 2024-01-29 | End: 2024-01-30

## 2024-01-29 RX ORDER — ECHINACEA PURPUREA EXTRACT 125 MG
1 TABLET ORAL
Status: DISCONTINUED | OUTPATIENT
Start: 2024-01-29 | End: 2024-01-30

## 2024-01-29 RX ORDER — ACETAMINOPHEN 325 MG/1
650 TABLET ORAL EVERY 4 HOURS PRN
Status: DISCONTINUED | OUTPATIENT
Start: 2024-01-29 | End: 2024-01-30

## 2024-01-29 RX ORDER — BACLOFEN 10 MG/1
10 TABLET ORAL 3 TIMES DAILY PRN
Status: DISCONTINUED | OUTPATIENT
Start: 2024-01-29 | End: 2024-01-30

## 2024-01-29 RX ORDER — METOPROLOL SUCCINATE 50 MG/1
50 TABLET, EXTENDED RELEASE ORAL
Status: DISCONTINUED | OUTPATIENT
Start: 2024-01-29 | End: 2024-01-30

## 2024-01-29 RX ORDER — SENNOSIDES 8.6 MG
17.2 TABLET ORAL NIGHTLY PRN
Status: DISCONTINUED | OUTPATIENT
Start: 2024-01-29 | End: 2024-01-30

## 2024-01-29 RX ORDER — ONDANSETRON 2 MG/ML
4 INJECTION INTRAMUSCULAR; INTRAVENOUS ONCE
Status: COMPLETED | OUTPATIENT
Start: 2024-01-29 | End: 2024-01-29

## 2024-01-29 RX ORDER — MELATONIN
3 NIGHTLY PRN
Status: DISCONTINUED | OUTPATIENT
Start: 2024-01-29 | End: 2024-01-30

## 2024-01-29 NOTE — PLAN OF CARE
Admitted from ED with complaints of dysphagia, states he had episodes of coughing spells when drinking water, no much with solid food.  Patient has history of esophageal cancer, had a stent insertion where he developed fistula.  GI was consulted, CT scan was ordered.  1300 CT was completed, results reviewed with GI APN, able to resume diet per GI stand point. ST at bedside, swallow evaluation was completed. Recommends slow rate, slow sips and sitting upright during meals.     Problem: GASTROINTESTINAL - ADULT  Goal: Maintains adequate nutritional intake (undernourished)  Description: INTERVENTIONS:  - Monitor percentage of each meal consumed  - Identify factors contributing to decreased intake, treat as appropriate  - Assist with meals as needed  - Monitor I&O, WT and lab values  - Obtain nutritional consult as needed  - Optimize oral hygiene and moisture  - Encourage food from home; allow for food preferences  - Enhance eating environment  Outcome: Not Progressing

## 2024-01-29 NOTE — H&P
[facility]     Dontae Cortez Jr. Patient Status:  Emergency    10/15/1970 MRN [id number]   Location [facility] EMERGENCY DEPARTMENT Attending Jose E Chung MD   Hosp Day # 0 PCP Adrian Horowitz MD     Chief Complaint: Aspiration    Subjective:    History of Present Illness:   Dontae Cortez Jr. is a 53 year old male with PMHx metastatic esophageal cancer s/p gastroesophagostomy  c/b esophagobronchial fistula, metastatic pancreatic cancer, CAD s/p CABG, C diff, GERD and depression who presents to the hospital as he feels like he is aspirating. He underwent EGD with removal of endobronchial stent and placement of ASD occluder device for the fistula on 2024.  He had been doing well after. He follows up with Dr. Hinson with GI and Dr. Madrid with pulmonology.  He also has history of CABG and had sternal was removed last week due to pain.  Afterwards he feels like he is aspirating whenever he eats or drinks anything.  He is coughing and bringing up a lot of sputum causing diffuse chest pain. No fever, nausea, vomiting or diarrhea. Reports cough and congestion. No worsening of chronic pain. CXR showed small right-sided pleural effusion with right basilar atelectasis/infiltrates.  This is not a new finding when compared to previous x-rays.  WBC normal and no fever in ER. GI consulted. Given morphine and admitted. CT chest ordered.    History/Other:    Past Medical History:  Past Medical History:   Diagnosis Date    Back problem     Belching 2022    Black stools 2022    Borderline diabetes     Dx in 2013 - HgA1C 6.2%    C. difficile diarrhea 10/23/2022    pt treated and without symptoms    Chest pain 2022    Coronary artery disease     On 13: CABG x 4 with LIMA to LAD and SVG to diagonal, OM and PDA    Decorative tattoo 2000    Depression     Difficult intubation     h/o esophagectomy for CA; developed esophagobronchial vistula     Disorder of liver     LIVER CA    Esophageal cancer (HCC)     completed chemo    Esophageal reflux     Essential hypertension     Exposure to medical diagnostic radiation     last tx 8/18/2022    Frequent urination 01/01/2013    Gastroparesis     Heartburn 02/01/2022    High blood pressure     High cholesterol 08/01/2013    Found when I had quadruple bypass    History of COVID-19 10/16/2022    asymptomatic - pt was dx during a hospitalization for another diagnosis. No continued symptoms    History of stomach ulcers     Hyperlipidemia     Hyperlipidemia LDL goal < 70     Indigestion 02/01/2022    Morbid obesity with BMI of 40.0-44.9, adult (HCC)     Muscle weakness     Nontoxic multinodular goiter     Dx in 8/2013: pt was told that imaging showed thyroid cysts per PCP    Pancreatic cancer (HCC)     last dose 12/4/2023 is scheduled for another round 12/27/23    Peripheral vascular disease (HCC)     pt denies    Personal history of antineoplastic chemotherapy     for esophageal cancer/completed    Personal history of antineoplastic chemotherapy 12/2023    pancreatic cancer    Problems with swallowing 02/01/2022    Pulmonary nodules     Dx in 8/2013: CT chest showed small bilateral fissural-based lung nodules less than 1 cm    S/P CABG x 4     On 8/16/13: CABG x 4 with LIMA to LAD and SVG to diagonal, OM and PDA    Shortness of breath     when coughing; no oxygen    Vomiting 02/01/2022     Past Surgical History:   Past Surgical History:   Procedure Laterality Date    APPENDECTOMY      APPENDECTOMY      ARTHROSCOPY OF JOINT UNLISTED      right shoulder    CABG      On 8/16/13: CABG x 4 with LIMA to LAD and SVG to diagonal, OM and PDA    CARDIAC CATH LAB      On 8/14/2013: cardiac cath showed 3-vessel disease    OTHER SURGICAL HISTORY      1.       Laparoscopic robotic-assisted esophagogastrectomy.      Family History:   Family History   Problem Relation Age of Onset    Cancer Mother         breast and colon      Diabetes Neg      Social History:    reports that he quit smoking about 10 years ago. His smoking use included cigarettes. He has never used smokeless tobacco. He reports that he does not currently use alcohol. He reports that he does not use drugs.     Allergies: No Known Allergies    Medications:    Current Facility-Administered Medications on File Prior to Encounter   Medication Dose Route Frequency Provider Last Rate Last Admin    [COMPLETED] ceFAZolin (Ancef) 2 g in 20mL IV syringe premix  2 g Intravenous On Call Vasquez Alexis MD   1 g at 01/25/24 1250    [COMPLETED] mupirocin (Bactroban) 2% nasal ointment             [COMPLETED] acetaminophen (Tylenol Extra Strength) tab 1,000 mg  1,000 mg Oral Once PRN Fátima Gallagher MD        Or    [COMPLETED] HYDROcodone-acetaminophen (Norco) 5-325 MG per tab 1 tablet  1 tablet Oral Once PRN Fátima Gallagher MD   1 tablet at 01/25/24 1547    Or    [COMPLETED] HYDROcodone-acetaminophen (Norco) 5-325 MG per tab 2 tablet  2 tablet Oral Once PRN Fátima Gallagher MD        [COMPLETED] prochlorperazine (Compazine) 10 MG/2ML injection             [COMPLETED] heparin (Porcine) 100 Units/mL lock flush 500 Units  5 mL Intracatheter Once Vasquez Alexis MD   500 Units at 01/25/24 1605    [COMPLETED] pembrolizumab (Keytruda) 400 mg in sodium chloride 0.9% 116 mL IVPB  400 mg Intravenous Once Grecia Angelo APRN   Stopped at 01/22/24 1016    [COMPLETED] trastuzumab-qyyp (Trazimera) 336 mg in sodium chloride 0.9% 266 mL infusion  4 mg/kg (Treatment Plan Recorded) Intravenous Once Grecia Angelo APRN   Stopped at 01/22/24 1054    [COMPLETED] ondansetron (Zofran) 4 MG/2ML injection             [COMPLETED] HYDROmorphone (Dilaudid) 1 MG/ML injection             [COMPLETED] heparin (Porcine) 100 Units/mL lock flush 500 Units  5 mL Intracatheter Once Charles Maguire MD   500 Units at 01/13/24 1104    [COMPLETED] HYDROmorphone (Dilaudid) 1 MG/ML injection              [] naloxone (Narcan) 0.4 MG/ML injection 0.08 mg  0.08 mg Intravenous Once PRN Junaid Evangelista DO        [COMPLETED] potassium chloride (K-Dur) tab 40 mEq  40 mEq Oral Once Jan Brito MD   40 mEq at 24 0833    [COMPLETED] morphINE PF 4 MG/ML injection 4 mg  4 mg Intravenous Once Jose E Chung MD   4 mg at 24    [] sodium chloride 0.9% infusion   Intravenous Continuous Jose E Chung MD   New Bag at 24 1002    [] morphINE PF 4 MG/ML injection 4 mg  4 mg Intravenous Q30 Min PRN Jose E Chung MD        [] ondansetron (Zofran) 4 MG/2ML injection 4 mg  4 mg Intravenous Q4H PRN Jose E Chugn MD        [COMPLETED] potassium chloride 40 mEq in 250mL sodium chloride 0.9% IVPB premix  40 mEq Intravenous Once Jose E Chung MD 62.5 mL/hr at 24 40 mEq at 24    [COMPLETED] morphINE PF 4 MG/ML injection 4 mg  4 mg Intravenous Once Jose E Chung MD   4 mg at 24    [COMPLETED] HYDROcodone-acetaminophen (Norco) 5-325 MG per tab 1 tablet  1 tablet Oral Once Jose E Chung MD   1 tablet at 24 2253    [COMPLETED] heparin (Porcine) 100 Units/mL lock flush 500 Units  5 mL Intracatheter Once Senia Foster MD   500 Units at 24 1123    [COMPLETED] potassium chloride 40 mEq in 250mL sodium chloride 0.9% IVPB premix  40 mEq Intravenous Once Megan Arshad MD 62.5 mL/hr at 24 1436 40 mEq at 24 1436    [COMPLETED] iopamidol 76% (ISOVUE-370) injection for power injector  80 mL Intravenous ONCE PRN Sophia Gibson MD   80 mL at 23 0248    [COMPLETED] potassium chloride 40 mEq in 250mL sodium chloride 0.9% IVPB premix  40 mEq Intravenous Once Jose Roberto Myles DO 62.5 mL/hr at 23 1328 40 mEq at 23 1328    [COMPLETED] ipratropium-albuterol (Duoneb) 0.5-2.5 (3) MG/3ML inhalation solution 3 mL  3 mL Nebulization Once Konrad Cuba MD   3 mL at 23 1617    [COMPLETED] ketorolac (Toradol) 15  MG/ML injection 15 mg  15 mg Intravenous Once Konrad Cuba MD   15 mg at 23 1649    [] ondansetron (Zofran) 4 MG/2ML injection 4 mg  4 mg Intravenous Q4H PRN Konrad Cuba MD        [COMPLETED] piperacillin-tazobactam (Zosyn) 3.375 g in dextrose 5% 100 mL IVPB-ADDV  3.375 g Intravenous Once Konrad Cuba MD   Stopped at 23 175    [COMPLETED] ketorolac (Toradol) 15 MG/ML injection 15 mg  15 mg Intravenous Once Konrad Cuba MD   15 mg at 23 181    [COMPLETED] morphINE PF 4 MG/ML injection 4 mg  4 mg Intravenous Once Konrad Cuba MD   4 mg at 23    [COMPLETED] morphINE PF 4 MG/ML injection 4 mg  4 mg Intravenous Once Konrad Cuba MD   4 mg at 23    [COMPLETED] trastuzumab-qyyp (Trazimera) 504 mg in sodium chloride 0.9% 274 mL infusion  6 mg/kg (Treatment Plan Recorded) Intravenous Once Nataly Siddiqui APRN   Stopped at 23 0930    [COMPLETED] sodium chloride 0.9 % IV bolus 500 mL  500 mL Intravenous Once Freddy Hernandez MD   Stopped at 23    [COMPLETED] ipratropium-albuterol (Duoneb) 0.5-2.5 (3) MG/3ML inhalation solution 3 mL  3 mL Nebulization Once Freddy Hernandez MD   3 mL at 23 1950    [COMPLETED] HYDROmorphone (Dilaudid) 1 MG/ML injection 0.5 mg  0.5 mg Intravenous Q30 Min PRN Freddy Hernandez MD   0.5 mg at 23    [COMPLETED] iohexol (Omnipaque) 350 MG/ML injection via power injector 100 mL  100 mL Intravenous ONCE PRN Freddy Hernandez MD   100 mL at 23 214    [COMPLETED] morphINE PF 4 MG/ML injection 4 mg  4 mg Intravenous Q30 Min PRN Wero Chiang MD   4 mg at 23 0813    [COMPLETED] magnesium sulfate in sterile water for injection 2 g/50mL IVPB premix 2 g  2 g Intravenous Once Megan Arshad MD 50 mL/hr at 23 1413 2 g at 23 1413    [] sodium chloride 0.9% infusion   Intravenous Continuous Nikia Fairchild MD         [] HYDROmorphone (Dilaudid) 1 MG/ML injection 1 mg  1 mg Intravenous Q30 Min PRN Nikia Fairchild MD        [] ondansetron (Zofran) 4 MG/2ML injection 4 mg  4 mg Intravenous Q4H PRN Nikia Fairchild MD        [COMPLETED] iopamidol 76% (ISOVUE-370) injection for power injector  100 mL Intravenous ONCE PRN Nikia Fairchild MD   100 mL at 12/10/23 2109    [COMPLETED] hydrALAzine (Apresoline) 20 mg/mL injection 10 mg  10 mg Intravenous Once Nikia Fairchild MD   10 mg at 12/10/23 2231    [COMPLETED] pembrolizumab (Keytruda) 400 mg in sodium chloride 0.9% 116 mL IVPB  400 mg Intravenous Once Grecia Angelo APRN   Stopped at 23 1243    [COMPLETED] trastuzumab-qyyp (Trazimera) 504 mg in sodium chloride 0.9% 274 mL infusion  6 mg/kg (Treatment Plan Recorded) Intravenous Once Grecia Angelo APRN   Stopped at 23 1319    [COMPLETED] magnesium sulfate in sterile water for injection 2 g/50mL IVPB premix 2 g  2 g Intravenous Once Glen Myles MD 50 mL/hr at 23 2 g at 23    [COMPLETED] morphINE PF 4 MG/ML injection 4 mg  4 mg Intravenous Once Dede Jcaques MD   4 mg at 23 1125    [COMPLETED] morphINE PF 2 MG/ML injection 2 mg  2 mg Intravenous Once Dede Jacques MD   2 mg at 23 1337    [COMPLETED] iopamidol 76% (ISOVUE-370) injection for power injector  75 mL Intravenous ONCE PRN Dede Jacques MD   75 mL at 23 1347    [COMPLETED] piperacillin-tazobactam (Zosyn) 4.5 g in dextrose 5% 100 mL IVPB-ADDV  4.5 g Intravenous Once Dede Jacques  mL/hr at 23 1517 4.5 g at 23 1517    [COMPLETED] vancomycin (Vancocin) 1.25 g in sodium chloride 0.9% 250mL IVPB premix  15 mg/kg Intravenous Once Glen Myles .7 mL/hr at 23 175 1,250 mg at 23 175    [COMPLETED] morphINE PF 2 MG/ML injection 2 mg  2 mg Intravenous Once Dede Jacques MD   2 mg at 23 1528    [COMPLETED] influenza vaccine split  quad (Fluzone QIV) 0.5 mL IM injection (ages 6 months to 64 years) 0.5 mL  0.5 mL Intramuscular Prior to discharge Eitan Landeros MD   0.5 mL at 11/11/23 1240    [COMPLETED] potassium chloride 20 mEq/100mL IVPB premix 20 mEq  20 mEq Intravenous Once Eitan Landeros MD   Stopped at 11/10/23 1751    [COMPLETED] magnesium sulfate in sterile water for injection 2 g/50mL IVPB premix 2 g  2 g Intravenous Once Eitan Landeros MD   Stopped at 11/10/23 1751    [COMPLETED] iron sucrose (Venofer) 20 mg/mL injection 200 mg  200 mg Intravenous Daily Senia Foster MD   200 mg at 11/10/23 0927    [COMPLETED] magnesium sulfate in sterile water for injection 2 g/50mL IVPB premix 2 g  2 g Intravenous Once Eitan Landeros MD 50 mL/hr at 11/08/23 0834 2 g at 11/08/23 0834    [COMPLETED] iopamidol 76% (ISOVUE-370) injection for power injector  80 mL Intravenous ONCE PRN Eitan Landeros MD   80 mL at 11/08/23 1612    [COMPLETED] morphINE PF 4 MG/ML injection 4 mg  4 mg Intravenous Once Freddy Duff MD   4 mg at 11/06/23 1519    [COMPLETED] ondansetron (Zofran) 4 MG/2ML injection 4 mg  4 mg Intravenous Once Freddy Duff MD   4 mg at 11/06/23 1519    [COMPLETED] trastuzumab-qyyp (Trazimera) 588 mg in sodium chloride 0.9% 278 mL infusion  6 mg/kg (Treatment Plan Recorded) Intravenous Once Senia Foster MD   Stopped at 10/31/23 1442     Current Outpatient Medications on File Prior to Encounter   Medication Sig Dispense Refill    baclofen 10 MG Oral Tab Take 1 tablet (10 mg total) by mouth 3 (three) times daily as needed.      ondansetron (ZOFRAN) 8 MG tablet Take 1 tablet (8 mg total) by mouth every 8 (eight) hours as needed for Nausea. 30 tablet 3    prochlorperazine (COMPAZINE) 10 mg tablet Take 1 tablet (10 mg total) by mouth every 6 (six) hours as needed for Nausea. 30 tablet 3    HYDROcodone-acetaminophen 5-325 MG Oral Tab Take 1-2 tablets by mouth every 4 (four) hours as needed for Pain. 90 tablet 0    cetirizine 5  MG Oral Tab Take 1 tablet (5 mg total) by mouth daily.      sucralfate (CARAFATE) 1 g Oral Tab Take 1 tablet (1 g total) by mouth 3 (three) times daily as needed. 60 tablet 3    losartan 50 MG Oral Tab Take 1 tablet (50 mg total) by mouth daily. 90 tablet 2    Omeprazole 40 MG Oral Capsule Delayed Release Take 1 capsule (40 mg total) by mouth 2 (two) times daily before meals. 90 capsule 3    sertraline 100 MG Oral Tab Take 1 tablet (100 mg total) by mouth daily. 90 tablet 1    Pancrelipase, Lip-Prot-Amyl, (CREON) 54786-73757 units Oral Cap DR Particles Take 2 capsules with meals and 1 capsule with snacks 240 capsule 0    metoprolol succinate ER 50 MG Oral Tablet 24 Hr TAKE 1 TABLET BY MOUTH EVERY DAY 90 tablet 1       Review of Systems:   A comprehensive review of systems was completed.    Pertinent positives and negatives noted in the HPI.    Objective:   Physical Exam:    BP (!) 161/74 (BP Location: Right arm)   Pulse 60   Temp 98.7 °F (37.1 °C) (Oral)   Resp 20   Ht 6' 2\" (1.88 m)   Wt 175 lb (79.4 kg)   SpO2 99%   BMI 22.47 kg/m²   General: No acute distress, awake and alert  Respiratory: No wheezing. Crackles RLL  Cardiovascular: S1, S2. Regular rate and rhythm  Abdomen: Soft, Non-tender, non-distended  Extremities: No edema    Results:    Labs:      Labs Last 24 Hours:  Recent Labs   Lab 01/29/24  0435   RBC 3.38*   HGB 10.0*   HCT 32.5*   MCV 96.2   MCH 29.6   MCHC 30.8*   RDW 14.4   NEPRELIM 4.77   WBC 6.9   .0     Recent Labs   Lab 01/29/24  0435   GLU 91   BUN 15   CREATSERUM 0.55*   EGFRCR 118   CA 8.8   ALB 2.7*      K 4.2      CO2 33.0*   ALKPHO 119*   AST 30   BILT 0.2   TP 6.8     Lab Results   Component Value Date    PT 20.4 (H) 01/30/2014    PT 22.9 (H) 01/13/2014    PT 25.7 (H) 01/08/2014    INR 1.26 (H) 01/25/2024    INR 1.20 11/27/2023    INR 1.08 01/26/2023     No results for input(s): \"TROP\", \"TROPHS\", \"CK\" in the last 168 hours.    No results for input(s): \"TROP\",  \"PBNP\" in the last 168 hours.    No results for input(s): \"PCT\" in the last 168 hours.    Imaging: Imaging data reviewed in Epic.    Assessment & Plan:      #Aspiration with cough   #Metastatic esophageal cancer s/p resection and gastroesophagostomy  #Esophagobronchial fistula s/p removal of endobronchial stent and placement of ASD occluder device for the fistula on 1/17/2024  #Dysphagia   -CXR with small right pleural effusion with right basilar atelectasis/infiltrates, not new from previous.  No fever or leukocytosis.  Hold antibiotics for now  -CT chest pending  -Check RVP and PCT  -GI consulted  -SLP eval  -Pain control, anti-emetics  -Continue home creon     #CAD s/p CABG with removal of sternal wires last week - continue home meds  #Hx of C diff  #GERD - PPI  #Depression - Zoloft   #HTN - resume home meds  #Chronic normocytic anemia - stable     #Metastatic esophageal cancer    #Metastatic pancreatic cancer  -Follows with Dr. Foster    Plan of care discussed with patient, RN.    Jose Roberto Myles, DO    Supplementary Documentation:     The 21st Century Cures Act makes medical notes like these available to patients in the interest of transparency. Please be advised this is a medical document. Medical documents are intended to carry relevant information, facts as evident, and the clinical opinion of the practitioner. The medical note is intended as peer to peer communication and may appear blunt or direct. It is written in medical language and may contain abbreviations or verbiage that are unfamiliar.

## 2024-01-29 NOTE — CONSULTS
[facility]                       [facility]    Dontae Buddy Ortegacristina Champion Patient Status:  Inpatient    10/15/1970 MRN [id number]   Location [facility] 4NW-A Attending Jose Roberto Myles, DO   Hosp Day # 0 PCP Adrian Horowitz MD     Reason for consultation: Recurrent pneumonia; history of bronchoesophageal fistula  HPI: This is a 53-year-old male with an extensive PMhx that includes CAD s/p CABG, HTN, dyslipidemia, and distal esophageal adenocarcinoma (Dx 2022) s/p chemo RT followed by laparoscopic robotic assisted esophagogastrectomy with J-tube placement (2022; Dr. Lu) recovery C/B anastomotic leak s/p endoscopic closure with stenting (Dr. Ritchie) with course further C/B GOO s/p EGD with esophageal stenting plus Botox injections (2022; Dr. Ritchie).  Patient underwent EUS for pancreatic head mass (2023; Dr. Ritchie) with biopsy revealing moderate to poorly differentiated carcinoma--unclear if metastatic esophageal to pancreas versus secondary pancreatic primary.  Patient known to the GI service for multiple admissions since 2023 with cough found to have severe ulceration at esophagogastric anastomosis with masslike appearance of tissue at anastomosis and one of the ulcer bases showing evidence of a fistula tract opening s/p bronchoscopy (2023; Dr. Madrid) revealing bronchoesophageal fistula with stent placement at [facility] the following week for ongoing symptoms.  Despite esophageal and bronchial stents the patient continues to require admissions with aspiration symptoms and underwent EGD with fistula closure using ASD occluder (off label use) plus bronchoscopy with removal of bronchial stent (2024; Dr. Ritchie + Dr. Madrid).  Patient reports tolerance of p.o. intake without issue until he underwent sternal wire removal last week.  Since then he reports sporadic episodes of coughing and congestion.  No nausea, vomiting, diarrhea, melena,  or hematochezia.  No abdominal pain.  CT chest completed with no definitive contrast entering the airway on exam, mildly enlarged mediastinal lymph nodes, and concern for PNA or aspiration in the right lower lobe.  PMHx:   Past Medical History:   Diagnosis Date    Back problem     Belching 02/01/2022    Black stools 02/01/2022    Borderline diabetes     Dx in 8/2013 - HgA1C 6.2%    C. difficile diarrhea 10/23/2022    pt treated and without symptoms    Chest pain 02/01/2022    Coronary artery disease     On 8/16/13: CABG x 4 with LIMA to LAD and SVG to diagonal, OM and PDA    Decorative tattoo 01/01/2000    Depression     Difficult intubation     h/o esophagectomy for CA; developed esophagobronchial vistula    Disorder of liver     LIVER CA    Esophageal cancer (HCC)     completed chemo    Esophageal reflux     Essential hypertension     Exposure to medical diagnostic radiation     last tx 8/18/2022    Frequent urination 01/01/2013    Gastroparesis     Heartburn 02/01/2022    High blood pressure     High cholesterol 08/01/2013    Found when I had quadruple bypass    History of COVID-19 10/16/2022    asymptomatic - pt was dx during a hospitalization for another diagnosis. No continued symptoms    History of stomach ulcers     Hyperlipidemia     Hyperlipidemia LDL goal < 70     Indigestion 02/01/2022    Morbid obesity with BMI of 40.0-44.9, adult (HCC)     Muscle weakness     Nontoxic multinodular goiter     Dx in 8/2013: pt was told that imaging showed thyroid cysts per PCP    Pancreatic cancer (HCC)     last dose 12/4/2023 is scheduled for another round 12/27/23    Peripheral vascular disease (HCC)     pt denies    Personal history of antineoplastic chemotherapy     for esophageal cancer/completed    Personal history of antineoplastic chemotherapy 12/2023    pancreatic cancer    Problems with swallowing 02/01/2022    Pulmonary nodules     Dx in 8/2013: CT chest showed small bilateral fissural-based lung nodules less  than 1 cm    S/P CABG x 4     On 8/16/13: CABG x 4 with LIMA to LAD and SVG to diagonal, OM and PDA    Shortness of breath     when coughing; no oxygen    Vomiting 02/01/2022                PSHx:   Past Surgical History:   Procedure Laterality Date    APPENDECTOMY      APPENDECTOMY      ARTHROSCOPY OF JOINT UNLISTED      right shoulder    CABG      On 8/16/13: CABG x 4 with LIMA to LAD and SVG to diagonal, OM and PDA    CARDIAC CATH LAB      On 8/14/2013: cardiac cath showed 3-vessel disease    OTHER SURGICAL HISTORY      1.       Laparoscopic robotic-assisted esophagogastrectomy.     Medications:    [COMPLETED] morphINE PF 4 MG/ML injection 4 mg  4 mg Intravenous Once    [COMPLETED] ondansetron (Zofran) 4 MG/2ML injection 4 mg  4 mg Intravenous Once    [COMPLETED] morphINE PF 4 MG/ML injection 4 mg  4 mg Intravenous Once    morphINE PF 4 MG/ML injection 4 mg  4 mg Intravenous Q2H PRN    baclofen (Lioresal) tab 10 mg  10 mg Oral TID PRN    cetirizine (ZyrTEC) tab 5 mg  5 mg Oral Daily    losartan (Cozaar) tab 50 mg  50 mg Oral Daily    metoprolol succinate ER (Toprol XL) 24 hr tab 50 mg  50 mg Oral Daily Beta Blocker    pantoprazole (Protonix) DR tab 40 mg  40 mg Oral BID AC    pancrelipase (Lip-Prot-Amyl) (Zenpep) DR particles cap 25,000 Units  25,000 Units Oral TID CC    sertraline (Zoloft) tab 100 mg  100 mg Oral Daily    sucralfate (Carafate) tab 1 g  1 g Oral TID PRN    sodium chloride 0.9% infusion   Intravenous Continuous    enoxaparin (Lovenox) 40 MG/0.4ML SUBQ injection 40 mg  40 mg Subcutaneous Daily    acetaminophen (Tylenol) tab 650 mg  650 mg Oral Q4H PRN    Or    HYDROcodone-acetaminophen (Norco) 5-325 MG per tab 1 tablet  1 tablet Oral Q4H PRN    Or    HYDROcodone-acetaminophen (Norco) 5-325 MG per tab 2 tablet  2 tablet Oral Q4H PRN    melatonin tab 3 mg  3 mg Oral Nightly PRN    polyethylene glycol (PEG 3350) (Miralax) 17 g oral packet 17 g  17 g Oral Daily PRN    sennosides (Senokot) tab 17.2 mg   17.2 mg Oral Nightly PRN    bisacodyl (Dulcolax) 10 MG rectal suppository 10 mg  10 mg Rectal Daily PRN    fleet enema (Fleet) 7-19 GM/118ML rectal enema 133 mL  1 enema Rectal Once PRN    ondansetron (Zofran) 4 MG/2ML injection 4 mg  4 mg Intravenous Q6H PRN    prochlorperazine (Compazine) 10 MG/2ML injection 5 mg  5 mg Intravenous Q8H PRN    benzonatate (Tessalon) cap 200 mg  200 mg Oral TID PRN    guaiFENesin (Robitussin) 100 MG/5 ML oral liquid 200 mg  200 mg Oral Q4H PRN    glycerin-hypromellose- (Artifical Tears) 0.2-0.2-1 % ophthalmic solution 1 drop  1 drop Both Eyes QID PRN    sodium chloride (Saline Mist) 0.65 % nasal solution 1 spray  1 spray Each Nare Q3H PRN    [COMPLETED] iopamidol 76% (ISOVUE-370) injection for power injector  75 mL Intravenous ONCE PRN     Allergies: No Known Allergies  Social HX:   Social History     Socioeconomic History    Marital status:     Number of children: 3   Occupational History    Occupation: works as  - on workman's comp   Tobacco Use    Smoking status: Former     Packs/day: 0.00     Years: 27.00     Additional pack years: 0.00     Total pack years: 0.00     Types: Cigarettes     Quit date: 8/15/2013     Years since quitting: 10.4    Smokeless tobacco: Never    Tobacco comments:     Quit smoking 2013   Vaping Use    Vaping Use: Never used   Substance and Sexual Activity    Alcohol use: Not Currently    Drug use: Never    Sexual activity: Not Currently     Partners: Female   Other Topics Concern    Caffeine Concern Yes    Stress Concern No    Weight Concern No    Special Diet No    Exercise No    Seat Belt No   Social History Narrative    Lives with life partner    Has 3 daughters - 1 lives closeby, 1 in [place], 1 in [place]     Social Determinants of Health     Financial Resource Strain: Low Risk  (12/14/2023)    Financial Resource Strain     Difficulty of Paying Living Expenses: Not very hard     Med Affordability: No   Food Insecurity: No Food  Insecurity (1/17/2024)    Food Insecurity     Food Insecurity: Never true   Transportation Needs: No Transportation Needs (1/17/2024)    Transportation Needs     Lack of Transportation: No   Housing Stability: Low Risk  (1/17/2024)    Housing Stability     Housing Instability: No     Housing Instability Emergency: No      FamHx: The patient has no family history of colon cancer or other gastrointestinal malignancies;  No family history of ulcer disease, or inflammatory bowel disease  ROS:  In addition to the pertinent positives described above:            Infectious Disease:  + recurrent PNA, COVID-19+            Cardiovascular: + CAD s/p CABG, HTN, dyslipidemia             Respiratory: No shortness of breath, asthma, copd, recurrent pneumonia            Hematologic: The patient reports no easy bruising, frequent gum bleeding or nose bleeding;  The patient has no history of known chronic anemia            Dermatologic: The patient reports no recent rashes or chronic skin disorders            Rheumatologic: The patient reports no history of chronic arthritis, myalgias, arthralgias            Genitourinary:  The patient reports no history of recurrent urinary tract infections, hematuria, dysuria, or nephrolithiasis           Psychiatric: + depression           Oncologic: + esophageal adenocarcinoma            ENT: The patient reports no hoarseness of voice, hearing loss, sinus congestion, tinnitus           Neurologic: The patient reports no history of seizure, stroke, or frequent headaches  PE: BP (!) 161/74 (BP Location: Right arm)   Pulse 60   Temp 98.7 °F (37.1 °C) (Oral)   Resp 20   Ht 6' 2\" (1.88 m)   Wt 175 lb (79.4 kg)   SpO2 99%   BMI 22.47 kg/m²   Gen: AAO x 3, able to speak in complete sentences  HENT: EOMI, PERRL, oropharynx is clear with moist mucosal membranes  Eyes: Sclerae are anicteric  Neck:  Supple without nuchal rigidity  CV: Regular rate and rhythm, with normal S1 and S2  Resp: Clear to  auscultation bilaterally without wheezes; rubs, rhonchi, or rales  Abdomen: Soft, non-tender, non-distended with the presence of bowel sounds; No hepatosplenomegaly; no rebound or guarding; No ascites is clinically apparent; no tympany to percussion  Ext: No peripheral edema or cyanosis  Skin: Warm and dry; mid-sternal dressing intact   Psychiatric: Appropriate mood and congruent affect without obvious depression or anxiety  Labs:   Lab Results   Component Value Date    WBC 6.9 01/29/2024    HGB 10.0 01/29/2024    HCT 32.5 01/29/2024    .0 01/29/2024    CREATSERUM 0.55 01/29/2024    BUN 15 01/29/2024     01/29/2024    K 4.2 01/29/2024     01/29/2024    CO2 33.0 01/29/2024    GLU 91 01/29/2024    CA 8.8 01/29/2024    ALB 2.7 01/29/2024    ALKPHO 119 01/29/2024    BILT 0.2 01/29/2024    AST 30 01/29/2024    ALT  01/29/2024      Comment:      Due to  backorder we are temporarily unable to offer hospital-based ALT testing at [place] [facility].   If urgently needed, please order ALT test code [id number].   The new order will need a new venipuncture and will be sent to [facility] for testing.   The expected turnaround time will be within 24 hours.      Recent Labs   Lab 01/29/24 0435   GLU 91   BUN 15   CREATSERUM 0.55*   CA 8.8      K 4.2      CO2 33.0*     Recent Labs   Lab 01/29/24 0435   RBC 3.38*   HGB 10.0*   HCT 32.5*   MCV 96.2   MCH 29.6   MCHC 30.8*   RDW 14.4   NEPRELIM 4.77   WBC 6.9   .0       Recent Labs   Lab 01/29/24 0435   AST 30       Imaging:   PROCEDURE:  CT CHEST(CONTRAST ONLY) (CPT=71260)     COMPARISON:  [facility] , CT, CT CHEST+ABDOMEN+PELVIS(ALL CNTRST ONLY)(CPT=71260/04214), 12/30/2023, 2:46 AM.     INDICATIONS:  tracheoesophageal fistula     TECHNIQUE:  CT images were obtained with non-ionic intravenous contrast material. Dose reduction techniques were used. Dose information is transmitted to the ACR (American College of Radiology) NRDR (National  Radiology Data Registry) which includes the  Dose Index Registry.     PATIENT STATED HISTORY:(As transcribed by Technologist)  Surveillance  tracheoesophageal fistula.      CONTRAST USED:  75cc of Omnipaque 350     FINDINGS:    The contrast is relatively dilute within the pull-through, best seen in the distal aspect and upper aspect of the pull-through.  Transverse dimension of the upper aspect the pull-through 4.0 cm.  No definite contrast is seen within airway structures.      Mildly enlarged lymph nodes in the aortopulmonic window image 52 and other nonenlarged lymph nodes in the mediastinum.     Aberrant right subclavian artery noted.     Areas of patchy consolidation in reticular nodular infiltrate right lower lobe.  Milder similar changes left posterior costophrenic angle.  No pleural effusion or pneumothorax.     Cardiomegaly without sign of pericardial effusion.  No aortic aneurysm.     Limited imaging upper abdomen shows cholecystectomy.         Impression   CONCLUSION:  Relatively dilute contrast attenuation within the gastroesophageal pull-through, with no definite contrast entering the airways on this exam.  Mildly enlarged mediastinal lymph nodes.  Concern for pneumonia or aspiration in the right lower  lobe.     LOCATION:  [id number]        Dictated by (CST): Ernesto Cabrera MD on 1/29/2024 at 12:03 PM      Finalized by (CST): Ernesto Cabrera MD on 1/29/2024 at 12:08 PM     Impression: 53-year-old male with an extensive PMhx that includes CAD s/p CABG, HTN, dyslipidemia, and distal esophageal adenocarcinoma (Dx 6/2022) s/p chemo RT followed by laparoscopic robotic assisted esophagogastrectomy with J-tube placement (9/2022; Dr. Lu) recovery C/B anastomotic leak s/p endoscopic closure with stenting (Dr. Ritchie) with course further C/B GOO s/p EGD with esophageal stenting plus Botox injections (11/2022; Dr. Ritchie) more recently noted to have pancreatic head mass (4/2023; Dr. Ritchie) with biopsy  revealing moderate to poorly differentiated carcinoma--unclear if metastatic esophageal to pancreas versus secondary pancreatic primary.  Patient known to GI service for multiple admissions since fall 2023 with cough found to have severe ulceration at esophagogastric anastomosis with masslike appearance of tissue at anastomosis and one of the ulcer bases showing evidence of a fistula tract opening s/p esophageal and bronchial stents without improvement thus pt underwent EGD with fistula closure using ASD occluder (off label use) plus bronchoscopy with removal of bronchial stent; 1/17/2024; Dr. Ritchie + Dr. Madrid).  Pt with recurrent aspiration symptoms following mediastinal wire removal however imaging only suggests PNA.   Recommendations:     ABX for PNA per hospitalist recommendations  Okay to resume diet from a GI perspective  Will discuss imaging and symptoms with interventional GI (Dr. Ritchie); consider consulting interventional pulmonology (Dr. Madrid)    Thank you for the consultation, we will follow the patient with you.  Attending addendum (Dr Cardona) to follow later today and provide formal, final recommendations at that time    Eugenia Perea, ARTEMIO  12:48 PM  1/29/2024  [facility]  [phone]    GI attending addendum:    I have personally seen and examined this patient and agree with above nurse practitioner's assessment and recommendations. 52 y/o male with complex history as stated above with bronchoesophageal fistula s/p attempted fistula closure with ASD occluder 1/17, admitted with recurring cough and RLL pneumonia suggestive of recurrent aspiration. Patient recently had sternal wire removal last week, and reports symptoms started the day after. On exam, appears comfortable in bed, on room air, denies dysphagia, odynophagia. Abdomen soft, non-distended. CT chest without obvious leak or broncho-esophageal communication. Continue antibiotics for now. Will discuss need for  further intervention with Dr. Erma Cardona MD  6:13 PM  1/29/2024  [facility]  [phone]

## 2024-01-29 NOTE — ED QUICK NOTES
Orders for admission, patient is aware of plan and ready to go upstairs. Any questions, please call ED RN Mona  at extension [phone].     Titratable drug(s) infusing: N/A  Rate: N/A    LOC at time of transport: A/Ox4    Other pertinent information: N/A    CIWA score= N/A  NIH score= N/A

## 2024-01-29 NOTE — SLP NOTE
ADULT SWALLOWING EVALUATION    ASSESSMENT    ASSESSMENT/OVERALL IMPRESSION:  Orders were received for a bedside swallowing evaluation. Patient reports awaking in the middle of the night with choking episodes and expectorating sputum. He also reports coughing when drinking liquids. He reports that following procedure on 1/17/24 that he was doing well until 1/24 when he had a cardiac procedure performed and then started having difficulty again. Patient with extensive GI history of esophageal adenocarcinoma, esophagectomy, and esophageal stent. He had a videofluoroscopic swallowing study(VFSS) performed on 1/9/24 with following results:    Oropharyngeal swallow appears largely intact. Bolus acceptance was adequate without evidence of anterior bolus loss. Mastication and AP bolus transit were thorough and efficient without evidence of oral residue. Pharyngeal swallow initiation was timely. Base of tongue retraction, epiglottic inversion, and hyolaryngeal excursion were WFL. Trace transient penetration observed with thin liquids which cleared with cessation of the swallow. No aspiration visualized. However, patient with strong cough response following consecutive sips of thin liquids via straw.     Oral motor mechanism exam revealed functional oral range, rate, and strength of oral musculature, intact dentition, and clear vocal quality. Strong cough on command.    Assessed patient with thin liquids via cup, puree, and solids.   Oral phase revealed functional oral acceptance, retrieval and mastication of solids with timely and complete clearing of the oral cavity.   Pharyngeal phase revealed an apparent timely initiation of the pharyngeal phase with functional hyolaryngeal elevation on palpation. Patient with intermittent coughing with liquids which is consistent with findings on recent VFSS.   Patient with a functional oral phase and apparent functional pharyngeal phase of swallow. Patients impairments are consistent  with esophageal involvement. Recommend Reflux precautions.   No further skilled swallowing intervention warranted.   Following VFSS on 1/9/24 the following was recommended:  Patient appears safe to continue a regular diet and thin liquids. Patient may benefit from ENT consult for possible laryngeal hypersensitivity; may be d/t chronic reflux.  RECOMMENDATIONS   Diet Recommendations - Solids: Regular  Diet Recommendations - Liquids: Thin Liquids       Compensatory Strategies Recommended: Slow rate;Small bites and sips  Aspiration Precautions: Upright position;Slow rate (Upright and NPO for 3 hours prior to sleeping to allow for emptying of digestive tract.)  Medication Administration Recommendations: No restrictions  Treatment Plan/Recommendations: No further inpatient SLP service warranted       HISTORY   MEDICAL HISTORY  Reason for Referral: R/O aspiration    Problem List  Principal Problem:    Dysphagia, unspecified type  Active Problems:    History of esophageal cancer      Past Medical History  Past Medical History:   Diagnosis Date    Back problem     Belching 02/01/2022    Black stools 02/01/2022    Borderline diabetes     Dx in 8/2013 - HgA1C 6.2%    C. difficile diarrhea 10/23/2022    pt treated and without symptoms    Chest pain 02/01/2022    Coronary artery disease     On 8/16/13: CABG x 4 with LIMA to LAD and SVG to diagonal, OM and PDA    Decorative tattoo 01/01/2000    Depression     Difficult intubation     h/o esophagectomy for CA; developed esophagobronchial vistula    Disorder of liver     LIVER CA    Esophageal cancer (HCC)     completed chemo    Esophageal reflux     Essential hypertension     Exposure to medical diagnostic radiation     last tx 8/18/2022    Frequent urination 01/01/2013    Gastroparesis     Heartburn 02/01/2022    High blood pressure     High cholesterol 08/01/2013    Found when I had quadruple bypass    History of COVID-19 10/16/2022    asymptomatic - pt was dx during a  hospitalization for another diagnosis. No continued symptoms    History of stomach ulcers     Hyperlipidemia     Hyperlipidemia LDL goal < 70     Indigestion 02/01/2022    Morbid obesity with BMI of 40.0-44.9, adult (HCC)     Muscle weakness     Nontoxic multinodular goiter     Dx in 8/2013: pt was told that imaging showed thyroid cysts per PCP    Pancreatic cancer (HCC)     last dose 12/4/2023 is scheduled for another round 12/27/23    Peripheral vascular disease (HCC)     pt denies    Personal history of antineoplastic chemotherapy     for esophageal cancer/completed    Personal history of antineoplastic chemotherapy 12/2023    pancreatic cancer    Problems with swallowing 02/01/2022    Pulmonary nodules     Dx in 8/2013: CT chest showed small bilateral fissural-based lung nodules less than 1 cm    S/P CABG x 4     On 8/16/13: CABG x 4 with LIMA to LAD and SVG to diagonal, OM and PDA    Shortness of breath     when coughing; no oxygen    Vomiting 02/01/2022       Prior Living Situation: Home with spouse  Diet Prior to Admission: Regular;Thin liquids  Precautions: Aspiration    Patient/Family Goals: To eat and drink    SWALLOWING HISTORY  Current Diet Consistency: Regular;Thin liquids  Dysphagia History: See Medical record for multiple Speech visits.   Imaging Results:      SUBJECTIVE       OBJECTIVE   ORAL MOTOR EXAMINATION  Dentition: Natural;Functional  Symmetry: Within Functional Limits  Strength: Within Functional Limits  Tone: Within Functional Limits  Range of Motion: Within Functional Limits  Rate of Motion: Within Functional Limits    Voice Quality: Clear  Respiratory Status: Unlabored (on room air)  Consistencies Trialed: Thin liquids;Puree;Hard solid  Method of Presentation: Self presentation;Spoon;Cup;Single sips  Patient Positioning: Upright;Midline (sitting on edge of bed)    Oral Phase of Swallow: Within Functional Limits                      Pharyngeal Phase of Swallow: Within Functional Limits            (Please note: Silent aspiration cannot be evaluated clinically. Videofluoroscopic Swallow Study is required to rule-out silent aspiration.)    Esophageal Phase of Swallow: Complaints consistent with possible esophageal involvement  Comments:         FOLLOW UP  Treatment Plan/Recommendations: No further inpatient SLP service warranted     Follow Up Needed (Documentation Required): No       Thank you for your referral.   If you have any questions, please contact DAKSHA Bell

## 2024-01-29 NOTE — H&P
[facility]     History & Physical           Dontae Cortez Jr. Patient Status:  No patient class for patient encounter    10/15/1970 MRN [id number]   Location [facility] Attending No att. providers found   Hosp Day # 0 PCP Adrian Horowitz MD         History of Present Illness:  Dontae Cortez Jr. is a(n) 53 year old male who underwent coronary artery bypass grafting in . He now reports that for the last 2-3 months he has been experiencing pain at the upper portion of his sternum and can feel a wire underneath. He has lost a significant amount of weight over the years due to metastatic cancer.      Past Medical History:       Past Medical History:   Diagnosis Date    Back problem      Belching 2022    Black stools 2022    Borderline diabetes       Dx in 2013 - HgA1C 6.2%    C. difficile diarrhea 10/23/2022     pt treated and without symptoms    Chest pain 2022    Coronary artery disease       On 13: CABG x 4 with LIMA to LAD and SVG to diagonal, OM and PDA    Decorative tattoo 2000    Depression      Disorder of liver       LIVER CA    Esophageal cancer (HCC)       completed chemo    Esophageal reflux      Essential hypertension      Exposure to medical diagnostic radiation       last tx 2022    Frequent urination 2013    Gastroparesis      Heartburn 2022    High blood pressure      High cholesterol 2013     Found when I had quadruple bypass    History of COVID-19 10/16/2022     asymptomatic - pt was dx during a hospitalization for another diagnosis. No continued symptoms    History of stomach ulcers      Hyperlipidemia      Hyperlipidemia LDL goal < 70      Indigestion 2022    Morbid obesity with BMI of 40.0-44.9, adult (HCC)      Muscle weakness      Nontoxic multinodular goiter       Dx in 2013: pt was told that imaging showed thyroid cysts per PCP    Pancreatic cancer (HCC)       last dose  12/4/2023 is scheduled for another round 12/27/23    Peripheral vascular disease (HCC)       pt denies    Personal history of antineoplastic chemotherapy       for esophageal cancer/completed    Personal history of antineoplastic chemotherapy 12/2023     pancreatic cancer    Problems with swallowing 02/01/2022    Pulmonary nodules       Dx in 8/2013: CT chest showed small bilateral fissural-based lung nodules less than 1 cm    S/P CABG x 4       On 8/16/13: CABG x 4 with LIMA to LAD and SVG to diagonal, OM and PDA    Shortness of breath      Vomiting 02/01/2022         Past Surgical History:        Past Surgical History:   Procedure Laterality Date    APPENDECTOMY        APPENDECTOMY        ARTHROSCOPY OF JOINT UNLISTED         right shoulder    CABG         On 8/16/13: CABG x 4 with LIMA to LAD and SVG to diagonal, OM and PDA    CARDIAC CATH LAB         On 8/14/2013: cardiac cath showed 3-vessel disease    OTHER SURGICAL HISTORY         1.       Laparoscopic robotic-assisted esophagogastrectomy.         Allergies:  No Known Allergies     Medications:    Prescriptions Prior to Admission   (Not in a hospital admission)         Social History:   reports that he quit smoking about 10 years ago. His smoking use included cigarettes. He has never used smokeless tobacco. He reports that he does not currently use alcohol. He reports that he does not use drugs.     Family History:        Family History   Problem Relation Age of Onset    Cancer Mother           breast and colon     Diabetes Neg           Review of Systems:  Constitutional: No fevers, chills, fatigue or night sweats.  ENT: No mouth pain, neck pain, running nose, headaches or swollen glands.  Skin: No rashes, pruritus or skin changes.   Respiratory: Denies wheezing or shortness of breath. +cough.  CV: Denies chest pain, palpitations, orthopnea, PND or dizziness.  Musculoskeletal: No joint pain, stiffness or swelling. +localized pain at upper sternum.  GI: No  nausea, vomiting or diarrhea. No blood in stools.  Neurologic: No seizures, tremors, weakness or numbness.     Physical Exam:    There were no vitals taken for this visit.  General: NAD  Neck: No JVD  Lungs: CTA bilat  Heart: RRR, S1, S2  Chest: palpable wires along sternum   Extremities: Warm, dry, no LE edema bilat  Skin: no rashes or legions noted  Neurological:  AAOx3, MAEW     Assessment/Plan:  Patient with palpable sternal wires likely secondary to significant weight loss. Upper sternal wire most bothersome/painful. We recommend removal of all seven sternal wires. Patient would like to proceed with surgery- scheduled for this Thursday 1/25.      ADDIE Cuenca

## 2024-01-29 NOTE — SLP NOTE
Orders were received for a bedside swallowing evaluation. Patient with a history of esophageal fistula. Admitted with concern for aspiration. GI to see patient. Will proceed with care following GI recommendations and clearance for po consumption.

## 2024-01-29 NOTE — ED PROVIDER NOTES
Patient Seen in: [facility] Emergency Department      History     Chief Complaint   Patient presents with    Cough/URI     Stated Complaint:     Subjective:   HPI    Patient is a 53-year-old male with a history of esophageal carcinoma status post resection and pull-through.  Recently diagnosed with a bronchoesophageal fistula and 12 days ago he underwent bronchoscopy and EGD with removal of endobronchial stent and placement of ASD occluder device for the fistula.  He reports initially after that he was feeling good but now in the last few days he has again started to feel like he is aspirating whenever he eats or drinks anything.  Coughing a lot and bringing up sputum.  Has diffuse chest pain.    Objective:   Past Medical History:   Diagnosis Date    Back problem     Belching 02/01/2022    Black stools 02/01/2022    Borderline diabetes     Dx in 8/2013 - HgA1C 6.2%    C. difficile diarrhea 10/23/2022    pt treated and without symptoms    Chest pain 02/01/2022    Coronary artery disease     On 8/16/13: CABG x 4 with LIMA to LAD and SVG to diagonal, OM and PDA    Decorative tattoo 01/01/2000    Depression     Difficult intubation     h/o esophagectomy for CA; developed esophagobronchial vistula    Disorder of liver     LIVER CA    Esophageal cancer (HCC)     completed chemo    Esophageal reflux     Essential hypertension     Exposure to medical diagnostic radiation     last tx 8/18/2022    Frequent urination 01/01/2013    Gastroparesis     Heartburn 02/01/2022    High blood pressure     High cholesterol 08/01/2013    Found when I had quadruple bypass    History of COVID-19 10/16/2022    asymptomatic - pt was dx during a hospitalization for another diagnosis. No continued symptoms    History of stomach ulcers     Hyperlipidemia     Hyperlipidemia LDL goal < 70     Indigestion 02/01/2022    Morbid obesity with BMI of 40.0-44.9, adult (HCC)     Muscle weakness     Nontoxic multinodular goiter     Dx in 8/2013: pt  was told that imaging showed thyroid cysts per PCP    Pancreatic cancer (HCC)     last dose 12/4/2023 is scheduled for another round 12/27/23    Peripheral vascular disease (HCC)     pt denies    Personal history of antineoplastic chemotherapy     for esophageal cancer/completed    Personal history of antineoplastic chemotherapy 12/2023    pancreatic cancer    Problems with swallowing 02/01/2022    Pulmonary nodules     Dx in 8/2013: CT chest showed small bilateral fissural-based lung nodules less than 1 cm    S/P CABG x 4     On 8/16/13: CABG x 4 with LIMA to LAD and SVG to diagonal, OM and PDA    Shortness of breath     when coughing; no oxygen    Vomiting 02/01/2022              Past Surgical History:   Procedure Laterality Date    APPENDECTOMY      APPENDECTOMY      ARTHROSCOPY OF JOINT UNLISTED      right shoulder    CABG      On 8/16/13: CABG x 4 with LIMA to LAD and SVG to diagonal, OM and PDA    CARDIAC CATH LAB      On 8/14/2013: cardiac cath showed 3-vessel disease    OTHER SURGICAL HISTORY      1.       Laparoscopic robotic-assisted esophagogastrectomy.                Social History     Socioeconomic History    Marital status:     Number of children: 3   Occupational History    Occupation: works as  - on workman's comp   Tobacco Use    Smoking status: Former     Packs/day: 0.00     Years: 27.00     Additional pack years: 0.00     Total pack years: 0.00     Types: Cigarettes     Quit date: 8/15/2013     Years since quitting: 10.4    Smokeless tobacco: Never    Tobacco comments:     Quit smoking 2013   Vaping Use    Vaping Use: Never used   Substance and Sexual Activity    Alcohol use: Not Currently    Drug use: Never    Sexual activity: Not Currently     Partners: Female   Other Topics Concern    Caffeine Concern Yes    Stress Concern No    Weight Concern No    Special Diet No    Exercise No    Seat Belt No   Social History Narrative    Lives with life partner    Has 3 daughters - 1  lives closeby, 1 in [place], 1 in [place]     Social Determinants of Health     Financial Resource Strain: Low Risk  (12/14/2023)    Financial Resource Strain     Difficulty of Paying Living Expenses: Not very hard     Med Affordability: No   Food Insecurity: No Food Insecurity (1/17/2024)    Food Insecurity     Food Insecurity: Never true   Transportation Needs: No Transportation Needs (1/17/2024)    Transportation Needs     Lack of Transportation: No   Housing Stability: Low Risk  (1/17/2024)    Housing Stability     Housing Instability: No     Housing Instability Emergency: No              Review of Systems    Positive for stated complaint:   Other systems are as noted in HPI.  Constitutional and vital signs reviewed.      All other systems reviewed and negative except as noted above.    Physical Exam     ED Triage Vitals   BP 01/29/24 0347 (!) 163/78   Pulse 01/29/24 0347 60   Resp 01/29/24 0347 18   Temp 01/29/24 0335 97.3 °F (36.3 °C)   Temp src 01/29/24 0335 Temporal   SpO2 01/29/24 0347 97 %   O2 Device 01/29/24 0347 None (Room air)       Current:BP (!) 182/81   Pulse 58   Temp 97.8 °F (36.6 °C) (Oral)   Resp 18   Ht 188 cm (6' 2\")   Wt 79.4 kg   SpO2 95%   BMI 22.47 kg/m²         Physical Exam  Vitals and nursing note reviewed.   Constitutional:       Appearance: He is well-developed.   HENT:      Head: Normocephalic and atraumatic.   Eyes:      Conjunctiva/sclera: Conjunctivae normal.      Pupils: Pupils are equal, round, and reactive to light.   Cardiovascular:      Rate and Rhythm: Normal rate and regular rhythm.      Heart sounds: Normal heart sounds.   Pulmonary:      Effort: Pulmonary effort is normal.      Breath sounds: Normal breath sounds.      Comments: Occasional cough.  Lungs are clear to auscultation with no distress.  Abdominal:      General: Bowel sounds are normal.      Palpations: Abdomen is soft.   Musculoskeletal:         General: Normal range of motion.   Skin:     General: Skin is warm  and dry.   Neurological:      Mental Status: He is alert and oriented to person, place, and time.               ED Course     Labs Reviewed   COMP METABOLIC PANEL (14) - Abnormal; Notable for the following components:       Result Value    CO2 33.0 (*)     Creatinine 0.55 (*)     Alkaline Phosphatase 119 (*)     Albumin 2.7 (*)     A/G Ratio 0.7 (*)     All other components within normal limits   CBC W/ DIFFERENTIAL - Abnormal; Notable for the following components:    RBC 3.38 (*)     HGB 10.0 (*)     HCT 32.5 (*)     MCHC 30.8 (*)     Lymphocyte Absolute 0.77 (*)     All other components within normal limits   SARS-COV-2/FLU A AND B/RSV BY PCR (GENEXPERT) - Normal    Narrative:     This test is intended for the qualitative detection and differentiation of SARS-CoV-2, influenza A, influenza B, and respiratory syncytial virus (RSV) viral RNA in nasopharyngeal or nares swabs from individuals suspected of respiratory viral infection consistent with COVID-19 by their healthcare provider. Signs and symptoms of respiratory viral infection due to SARS-CoV-2, influenza, and RSV can be similar.    Test performed using the Xpert Xpress SARS-CoV-2/FLU/RSV (real time RT-PCR)  assay on the GeneXpert instrument, [employer], [place] [address].   This test is being used under the Food and Drug Administration's Emergency Use Authorization.    The authorized Fact Sheet for Healthcare Providers for this assay is available upon request from the laboratory.   CBC WITH DIFFERENTIAL WITH PLATELET    Narrative:     The following orders were created for panel order CBC With Differential With Platelet.  Procedure                               Abnormality         Status                     ---------                               -----------         ------                     CBC W/ DIFFERENTIAL[[id number]]          Abnormal            Final result                 Please view results for these tests on the individual orders.     EKG    Rate,  intervals and axes as noted on EKG Report.  Rate: 58  Rhythm: Sinus Rhythm  Reading: No ST segment or T wave changes.  No old available for comparison.              [facility]    NAME: ALEX RODRIGUEZ    DATE OF EXAM: 01/29/2024  Patient No:  [id number]  Physician:  GERALDINE^JEREMIE LESTER                            [facility]  YOB: 1970    Past Medical History (entered by Technologist):    Reason For Exam (entered by Technologist):  cough  Other Notes (entered by Technologist): Patient states consistent cough and has a feeling that there is fluid in his lungs.  Dr. Chiang [phone]  Priors sent.    Additional Information (per [facility] Radiologist):      Chest 2 views    Comparison: 1/8/2024    IMPRESSION:    Right bronchial stent has been removed.  New Amplatzer device in the region of the right main bronchus.  Stable left chest port    Blunted right costophrenic angle indicative of pleural thickening or small effusion    Clustered nodular opacities in the right lung base could be aspiration pneumonitis or infectious/inflammatory alveolitis.       MDM      Patient is a 53-year-old male with complicated history of esophageal adenocarcinoma s/p resection and pull-through.  Developed an bronchoesophageal fistula and has been readmitted several times recent with recurrent symptoms of aspiration and productive cough.  12 days ago he underwent endoscopy/bronchoscopy and closure with an ASD occluder device.  States he was feeling pretty good for a while.  Underwent another procedure this last week to have sternal wires removed from his remote sternotomy.  It went fine but he states since then he has been having pain at that site and also has developed a cough, bringing up sputum at times.  Feels like he is aspirating again.  Well-appearing here, no distress.  Will check x-ray, basic labs and discuss with GI.  Admission disposition: 1/29/2024  7:08 AM           Update at 6:40 AM.  Labs unremarkable with no  leukocytosis.  Electrolytes are normal.  Chest x-ray as above with nodule opacities in the right base but there is no significant change in this or x-ray compared to prior.  No new effusion or new infiltrates.  Discussed case with Dr. Ritchie of gastroenterology he knows him well.  He does request patient will be admitted to the hospitalist and he will see him today as patient may need repeat imaging due to his recurrent symptoms.        Past Medical History-esophageal carcinoma, coronary artery disease, hypertension    Differential diagnosis before testing included recurrent fistula, aspiration    Co-morbidities that add to the complexity of management include: Multiple esophageal procedures    Testing ordered during this visit included labs, chest x-ray    Radiographic images  I personally reviewed the radiographs and my individual interpretation shows no new infiltrate or effusion  I also reviewed the official reports that showed no new infiltrate or effusion    External chart review showed reviewed recent procedure notes and prior discharge summary    Discussion of management with hospitalist, GI              Disposition:    Admission  I have discussed with the patient the results of test, differential diagnosis, and treatment plan. They expressed clear understanding of these instructions and agrees to the plan provided.                              Medical Decision Making      Disposition and Plan     Clinical Impression:  1. Dysphagia, unspecified type    2. History of esophageal cancer         Disposition:  Admit  1/29/2024  7:08 am    Follow-up:  No follow-up provider specified.        Medications Prescribed:  Current Discharge Medication List                            Hospital Problems       Present on Admission  Date Reviewed: 1/23/2024            ICD-10-CM Noted POA    * (Principal) Dysphagia, unspecified type R13.10 1/8/2024 Unknown

## 2024-01-30 VITALS
DIASTOLIC BLOOD PRESSURE: 74 MMHG | SYSTOLIC BLOOD PRESSURE: 162 MMHG | WEIGHT: 175 LBS | HEART RATE: 48 BPM | TEMPERATURE: 98 F | HEIGHT: 74 IN | OXYGEN SATURATION: 95 % | RESPIRATION RATE: 16 BRPM | BODY MASS INDEX: 22.46 KG/M2

## 2024-01-30 LAB
ANION GAP SERPL CALC-SCNC: 3 MMOL/L (ref 0–18)
BUN BLD-MCNC: 10 MG/DL (ref 9–23)
CALCIUM BLD-MCNC: 9.2 MG/DL (ref 8.5–10.1)
CHLORIDE SERPL-SCNC: 104 MMOL/L (ref 98–112)
CO2 SERPL-SCNC: 32 MMOL/L (ref 21–32)
CREAT BLD-MCNC: 0.58 MG/DL
EGFRCR SERPLBLD CKD-EPI 2021: 116 ML/MIN/1.73M2 (ref 60–?)
GLUCOSE BLD-MCNC: 101 MG/DL (ref 70–99)
OSMOLALITY SERPL CALC.SUM OF ELEC: 287 MOSM/KG (ref 275–295)
POTASSIUM SERPL-SCNC: 4.5 MMOL/L (ref 3.5–5.1)
SODIUM SERPL-SCNC: 139 MMOL/L (ref 136–145)

## 2024-01-30 NOTE — PLAN OF CARE
Pt Aox4. VSS,afebrile. Spo2 wnl on RA. Refuses tele, EP, lovenox. Continent x2. C/o incision pain- steri strips on chest for recent endobronchial stent removal. IV&PO pain meds given for pain relief. Left port a cath, unit draw. IVF. NPO at midnight. No c/o n/v/d/c. Pt updated on POC. Call light within reach. No further needs at this time.     POC: Pain management, tolerate food w/ improved dysphagia, IVF.     ** pt stated he is not a difficult intubation and refuses protocol.     07:08- Pt left JENNIFER- MD notified- paperwork signed. Pt de accessed his own port. Walked out with belongings. Risks reviewed with pt.      Problem: PAIN - ADULT  Goal: Verbalizes/displays adequate comfort level or patient's stated pain goal  Description: INTERVENTIONS:  - Encourage pt to monitor pain and request assistance  - Assess pain using appropriate pain scale  - Administer analgesics based on type and severity of pain and evaluate response  - Implement non-pharmacological measures as appropriate and evaluate response  - Consider cultural and social influences on pain and pain management  - Manage/alleviate anxiety  - Utilize distraction and/or relaxation techniques  - Monitor for opioid side effects  - Notify MD/LIP if interventions unsuccessful or patient reports new pain  - Anticipate increased pain with activity and pre-medicate as appropriate  Outcome: Progressing

## 2024-01-30 NOTE — DISCHARGE SUMMARY
[facility]     Dontae Cortez Jr. Patient Status:  Inpatient    10/15/1970 MRN [id number]   Location [facility] 4NW-A Attending No att. 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 is a 53 year old male with PMHx metastatic esophageal cancer s/p gastroesophagostomy  c/b esophagobronchial fistula, metastatic pancreatic cancer, CAD s/p CABG, C diff, GERD and depression who presents to the hospital as he feels like he is aspirating. He underwent EGD with removal of endobronchial stent and placement of ASD occluder device for the fistula on 2024.  He had been doing well after. He follows up with Dr. Hinson with GI and Dr. Madrid with pulmonology.  He also has history of CABG and had sternal was removed last week due to pain.  Afterwards he feels like he is aspirating whenever he eats or drinks anything.  He is coughing and bringing up a lot of sputum causing diffuse chest pain. No fever, nausea, vomiting or diarrhea. Reports cough and congestion. No worsening of chronic pain. CXR showed small right-sided pleural effusion with right basilar atelectasis/infiltrates.  This is not a new finding when compared to previous x-rays.  WBC normal and no fever in ER. GI consulted. Given morphine and admitted. CT chest ordered.     Brief Synopsis: CXR with small right pleural effusion with right basilar atelectasis/infiltrates, not new from previous.  CT chest did not show any leak. No fever,  hypoxia or leukocytosis. CT findings likely pneumonitis and so abx held given recent C diff. GI saw patient and plan was to discuss case with Dr. Ritchie but he left AMA.    Lace+ Score: 79  59-90 High Risk  29-58 Medium Risk  0-28   Low Risk       TCM Follow-Up Recommendation:  LACE > 58: High Risk of readmission after discharge from the hospital.    Procedures during hospitalization:   None    Incidental or significant findings and recommendations (brief descriptions):  None    Lab/Test results pending at Discharge:   None    Consultants:  GI    Discharge Medication List:     Discharge Medications        ASK your doctor about these medications        Instructions Prescription details   baclofen 10 MG Tabs  Commonly known as: Lioresal      Take 1 tablet (10 mg total) by mouth 3 (three) times daily as needed.   Refills: 0     cetirizine 5 MG Tabs  Commonly known as: ZyrTEC      Take 1 tablet (5 mg total) by mouth daily.   Refills: 0     Creon 36880-75987 units Cpep  Generic drug: Pancrelipase (Lip-Prot-Amyl)      Take 2 capsules with meals and 1 capsule with snacks   Quantity: 240 capsule  Refills: 0     HYDROcodone-acetaminophen 5-325 MG Tabs  Commonly known as: Norco      Take 1-2 tablets by mouth every 4 (four) hours as needed for Pain.   Quantity: 90 tablet  Refills: 0     losartan 50 MG Tabs  Commonly known as: Cozaar      Take 1 tablet (50 mg total) by mouth daily.   Quantity: 90 tablet  Refills: 2     metoprolol succinate ER 50 MG Tb24  Commonly known as: Toprol XL      TAKE 1 TABLET BY MOUTH EVERY DAY   Quantity: 90 tablet  Refills: 1     Omeprazole 40 MG Cpdr      Take 1 capsule (40 mg total) by mouth 2 (two) times daily before meals.   Quantity: 90 capsule  Refills: 3     ondansetron 8 MG tablet  Commonly known as: Zofran      Take 1 tablet (8 mg total) by mouth every 8 (eight) hours as needed for Nausea.   Quantity: 30 tablet  Refills: 3     prochlorperazine 10 mg tablet  Commonly known as: Compazine       Take 1 tablet (10 mg total) by mouth every 6 (six) hours as needed for Nausea.   Quantity: 30 tablet  Refills: 3     sertraline 100 MG Tabs  Commonly known as: Zoloft      Take 1 tablet (100 mg total) by mouth daily.   Quantity: 90 tablet  Refills: 1     sucralfate 1 g Tabs  Commonly known as: Carafate      Take 1 tablet (1 g total) by mouth 3 (three) times daily as needed.   Stop taking on: January 31, 2024  Quantity: 60 tablet  Refills: 3              [place] No [place] substances prescribed at discharge.    Follow-up appointment:   No follow-up provider specified.  Appointments for Next 30 Days 1/30/2024 - 2/29/2024        Date Arrival Time Visit Type Length Department Provider     1/31/2024  9:40 AM  EGD [21414] 30 min [facility], PROCEDURE    Patient Instructions:     Please arrive 60 minutes prior to your scheduled procedure time.                    2/5/2024  9:30 AM  CHEMOTHERAPY [2076] 60 min [facility] in [place] PF TX RN3    Patient Instructions:         Location Instructions:     Your appointment is scheduled at the [facility] in [place]. The address is [address] [place] [address]. Please park in the GREEN LOT and enter through the CANCER [place] ENTRANCE of BUILDING A.. Once you arrive, please register at the [facility]  on the second floor.  Masks are optional for all patients and visitors, unless otherwise indicated. No care partners/visitors under 18 years of age are allowed in the infusion room.               2/5/2024  9:45 AM  ON TREATMENT VISIT FOLLOW-UP [[id number]] 15 min [facility] in [place] Senia Foster MD    Patient Instructions:         Location Instructions:     **IF YOU NEED LABWORK OR AN INFUSION ALONG WITH YOUR APPOINTMENT, YOU MUST CALL TO SCHEDULE.**  Your appointment is scheduled at the [facility] in [place]. The address is [address] [place] [address]. Please park in the GREEN LOT  and enter through the CANCER CENTER ENTRANCE of BUILDING A. Once you arrive, please register at the Cancer Center  on the second floor.  Masks are optional for all patients and visitors, unless otherwise indicated.               2/5/2024 10:00 AM  PF HEM/ONC-DIETARY F/U [2523] 30 min [facility] in [place] PF HEM/ONC EDILMA STONER    Patient Instructions:     Your appointment is scheduled at the [facility] in [place]. The address is [address] [place] [address]. Please park at the Green Lot and enter through building A. Once you arrive, please register at the [facility]  on the second floor.          Location Instructions:     **IF YOU NEED LABWORK OR AN INFUSION ALONG WITH YOUR APPOINTMENT, YOU MUST CALL TO SCHEDULE.**  Your appointment is scheduled at the [facility] in [place]. The address is [address] [place] [address]. Please park in the GREEN LOT and enter through the CANCER CENTER ENTRANCE of BUILDING A. Once you arrive, please register at the [facility]  on the second floor.  Masks are optional for all patients and visitors, unless otherwise indicated.               2/22/2024 10:00 AM  POST OP [[id number]] 15 min [facility] Hicks PA    Patient Instructions:                         Vital signs:  Temp:  [97.6 °F (36.4 °C)-98 °F (36.7 °C)] 97.6 °F (36.4 °C)  Pulse:  [48-65] 48  Resp:  [16-19] 16  BP: (134-162)/(64-79) 162/74  SpO2:  [95 %-100 %] 95 %    Physical Exam:    Left AMA.  -----------------------------------------------------------------------------------------------  PATIENT DISCHARGE INSTRUCTIONS: See electronic chart    Jose Roberto Myles DO    Time spent:  15 minutes

## 2024-01-31 ENCOUNTER — TELEPHONE (OUTPATIENT)
Dept: HEMATOLOGY/ONCOLOGY | Facility: HOSPITAL | Age: 54
End: 2024-01-31

## 2024-01-31 DIAGNOSIS — C15.9 MALIGNANT NEOPLASM OF ESOPHAGUS, UNSPECIFIED LOCATION (HCC): ICD-10-CM

## 2024-01-31 RX ORDER — BACLOFEN 10 MG/1
10 TABLET ORAL 3 TIMES DAILY PRN
Qty: 90 TABLET | Refills: 0 | Status: SHIPPED | OUTPATIENT
Start: 2024-01-31

## 2024-01-31 RX ORDER — BENZONATATE 100 MG/1
100 CAPSULE ORAL 3 TIMES DAILY PRN
Qty: 30 CAPSULE | Refills: 0 | Status: SHIPPED | OUTPATIENT
Start: 2024-01-31

## 2024-01-31 RX ORDER — HYDROCODONE BITARTRATE AND ACETAMINOPHEN 5; 325 MG/1; MG/1
1-2 TABLET ORAL EVERY 4 HOURS PRN
Qty: 90 TABLET | Refills: 0 | Status: SHIPPED | OUTPATIENT
Start: 2024-01-31 | End: 2024-02-12

## 2024-01-31 NOTE — TELEPHONE ENCOUNTER
Spoke to patient regarding refill of [place].  Per patient the script sent on 1/22/24 of 90 tabs he only has 4 left.  He states he was told to take 2 tabs every 2 hours by prescriber.  He is requesting a refill and call back to discuss pain management.

## 2024-01-31 NOTE — TELEPHONE ENCOUNTER
Patient at pharmacy and his [place] refill not sent.  He had requested a refill on Norco 5/325 mg tabs.

## 2024-02-01 NOTE — PROGRESS NOTES
[facility] Note    Diagnosis:   Metastatic Esophageal Cancer. Possible 2nd pancreatic primary?  -Initially cT3 N1 Esophageal Adenocarcinoma (stage IIIB)  -HER2 amplified by FISH    Treatment History:   1. Neoadjuvant chemoRT with carbo-taxol: 7/6/22-8/10/22    2. laparoscopic robotic-assisted esophagogastrectomy with feeding jejunostomy tube placement on 9/30/2022.     3. Adjuvant Opdivo: 1/9/23-2/20/23    ---Metastatic disease developed-----    5. Chemo-Herceptin-Immunotherapy: 5/1/23-09/2023 --> Switched to Herceptin/Immunotherapy maintenance on 9/25/23 due to negative signatera. Chemotherapy restarted on 2/5/24 due to progression     Visit Diagnosis:  1. Malignant neoplasm of lower third of esophagus (HCC)        History of Present Illness: 52M with a PMH of CAD status post CABG, HLD and HTN is here for follow up for metastatic esophageal adenocarcinoma, HER2+. He has either a 2nd pancreatic primary vs. Metastatic esophageal to the pancreas. He is now on q3 week maintenance Keytruda and Herceptin.     Oncology History   2018: Per report had a normal EGD and colonoscopy    June 2022: He met with GI due to new onset dysphagia which started about 1 month prior to his visit.  He had an esophagram with a focal abrupt narrowing with irregular margins in the distal one third of the esophagus extending to the GE junction.  He also had an episode of food impaction. He has also lost about 15 lb. He was a heavy smoker from age 14 to about 40 (1.5 PPD). Also with alcohol use currently. Mother with a history of breast and colon cancer.     EGD and EUS with Dr. Yadav on 6/7/2022: Severe esophagitis with a concerning mass extending 37-39 centimeters from the incisors.  Nonobstructive mass.  By EUS uT3N1 disease.  Pathology showed invasive moderate to poorly differentiated adenocarcinoma.  HER2 amplified by FISH    [facility] CAP [facility] on 6/16/22: Results not loaded to PACs yet.  CA  19-9 from the same day was 107.4.  CEA normal    PET/CT on 6/20/2022: Increased distal esophageal uptake with an SUV of 14.4.  Concern for shreya metastases.    Concurrent chemoradiation with carboplatin AUC2 plus paclitaxel 50 mg/m2  C1D1: 7/6/22: weight 280 :  137  C1D8: 7/13/22: weight 277  C1D15: 7/20/22: weight 278.   C1D22: 7/27/22  103. Weight 278   C1D29: 8/3/22:  49. Weight 275  C1D36: 8/10/22:  30.9. Weight 276    PET/CT on 9/6/22: CONCLUSION:  Improvement.  Abnormal elevated activity in the distal esophagus, but uptake has decreased compared to the prior.  Furthermore, there is no longer uptake identified within 2 upper abdominal lymph nodes, 1 has disappeared from view, another has decreased in size.     Surgery with Dr. Lu on 9/30/22: ypT1b pN0. Recovery has been complicated by an esophageal leak,     I met with him on 12/12/22. We discussed adjuvant opdivo for 1 year. His  was elevated to 48 and repeat was 64. CT CAP was recommended.     He was admitted on 12/25/22 for abdominal pain. He had a POEM procedure done. He was also noted to have a RLL consolidation. He was seen by pulmonary, based on imaging an outpatient PET/CT was recommended and a biopsy of the most FDG avid lesion would be considered. Noted to have CAD and an outpatient evaluation was recommended.   CTA Chest/A/P from 12/24/22: 1. No evidence of pulmonary embolism. 2. There has been interval esophagectomy with gastric pull-through.  No postoperative complications along the operative site noted. 3. The prior described focus of atelectasis along the right lung base is noted seen along the medial segment of the right middle lobe and has a consolidative type appearance, similar in configuration to the previous study and measuring 6.2 x 4.6 x 3.6 cm.  The appearance is concerning for potential metastasis.  Further assessment with PET/CT is recommended. 4. Diverticulosis of the sigmoid colon is again noted.   There has been interval placement of a short segment stent within the mid sigmoid colon as described above.  Please correlate with patient's history.   PET/CT on 1/4/23: 1. Abnormal uptake corresponding to consolidation at the right lung base.  This is likely inflammatory/infectious in nature.  The patient had a prior right hydropneumothorax requiring small bore chest tube placement.   2. Abnormal uptake corresponding to a small soft tissue focus in anterior abdominal wall.  This may be related to prior surgery.  Attention on follow-up imaging recommended. 3. No convincing metastatic findings in the chest, abdomen or pelvis. 4. Details as above  His case was reviewed in GI TB. No focal areas of concern. Given stable imaging, recommend proceed with immunotherapy and repeat imaging after 4-6 cycles.     Adjuvant Opdivo: 1/19/2023  C1: 1/9/23:  198  C2: 1/23/23:  212  C3: 2/6/23:  284  C4: 2/20/23:  357    Tumor Board Review of Most recent pathology. Appears to be granulomatous disease. Malignancy very low on the differential. He should follow up with pulmonary medicine and infectious disease. We will continue routine surveillance imaging for his esophageal cancer. We will continue with immunotherapy as planned.     CT CAP on 2/28/23: 1. Stable postsurgical changes status post esophagectomy and gastric pull-through. 2. Nodules along the left major fissure appear mildly more prominent compared to the prior examination.  Continued attention on follow-up is recommended.  Additional right middle lobe nodule appears stable. 3. Bandlike opacity in the region of the right middle lobe appears improved compared to the prior exam and may represent postsurgical change versus atelectasis although neoplasm cannot be completely excluded.  Continued attention on follow-up is recommended.  A repeat PET-CT may be obtained to document stability. 4. No evidence of metastatic disease in the abdomen and  pelvis.    C5: 3/6/23    PET/CT on 4/10/23: 1. Foci of hypermetabolism of the pancreatic head and tail segments with SUV max of 7.2 and 5.1, respectively, highly suspicious for metastases. 2. Subtle hypermetabolic focus of the posterior segment right hepatic lobe (SUV max 3.8), equivocal for variable physiologic uptake versus metastasis.  Liver MRI may be helpful for further characterization.   3. Suspected benign inflammatory uptake within the thorax, as above.     EGD/EUS on 4/27/23 with Dr. Ritchie: Pancreatic head mass positive for adenocarcinoma--Comparison to previous esophageal cancer shows similarity but IHC in non-specific. Her 2 IHC was 2+ on this specimen but FISH was negative.     Chemo + Herceptin + Immunotherapy  C1: 5/1/23: FOLFOX-Herceptin-Opdivo  C2: 5/15/23:  1454: FOLFOX-Herceptin-Opdivo  C3: 6/5/23: Switched to Xeloda, Oxaliplatin, Herceptin, Keytruda:  329  C4: 7/10/23: Delayed to the thrombocytopenia:  79 to 35 Will switch back to 5-FU  C5: 7/24/23:  21.5    PET/CT on 7/27/23: 1. Previously described hypermetabolic lesions in the pancreas and in the liver are no longer identified on this study and could represent resolved or treated metastases. 2. Mild activity near the proximal anastomosis at the gastric pull-through is not significantly changed. 3. There are no new suspicious findings     C6: 8/7/23:  15.1  C7: 8/28/23: Ca19-9 WE are holding Oxaliplatin d/t neuropathy.  13.9  --5FU + Herceptin + Immunotherapy alone--  C8: 9/11/23:  12.3 Signatera negative    Herceptin and Keytruda maintenance q3 weeks since Signatera was negative   C1: 9/25/23:  10.3  C2: 10/9/23: Ca19-9 15. 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 Diffuse ground-glass opacity in the left lung, which has intervally increased in comparison to 12/30/2023.  There is new FDG avid mediastinal lymphadenopathy, which may be reactive versus malignant.  Continued attention on follow-up is recommended.  Continued follow-up to resolution of the ground-glass opacities recommended. 2. New FDG avid hepatic and pancreatic lesions.  The uptake near the pancreatic uncinate process is similar to 4/10/2023, which had resolved on 7/27/2023 exam.  This is suspicious for recurrence of disease. Given these findings, we discussed restarting FOLFOX.     -admitted from 1/8/24-1/13/24 for persistent dysphagia and bronchesophageal stent. Plan is for an ASD occluder and removal of the bronchial stent as an outpatient.     -1/17/24: EGD with fistula closure with ASD occluder and removal of the bronchial stent.    -FOLFOX + Herceptin + Opdivo restarted  C1: 2/5/24    Interval History: 2/5/24  -sternotomy wires removed on 1/25/24  -due for TTE. He will schedule  -Still with occasional hemoptysis. Energy is ok. Neuropathy is stable   -Plan to restart chemotherapy today     [employer] Systems: 12 Point ROS was completed and pertinent positives are in the HPI    Current Outpatient Medications on File Prior to Visit   Medication Sig Dispense Refill    HYDROcodone-acetaminophen 5-325 MG Oral Tab Take 1-2 tablets by mouth every 4 (four) hours as needed for Pain. 90 tablet 0    benzonatate 100 MG Oral Cap Take 1 capsule (100 mg total) by mouth 3 (three) times daily as needed for cough. 30 capsule 0    baclofen 10 MG Oral Tab Take 1 tablet (10 mg total) by mouth 3 (three) times daily as needed. 90 tablet 0    ondansetron (ZOFRAN) 8 MG tablet Take 1 tablet (8 mg total) by mouth every 8 (eight) hours as needed for Nausea. 30 tablet 3    cetirizine 5 MG Oral Tab Take 1 tablet (5 mg total) by mouth daily.       sucralfate (CARAFATE) 1 g Oral Tab Take 1 tablet (1 g total) by mouth 3 (three) times daily as needed. 60 tablet 3    losartan 50 MG Oral Tab Take 1 tablet (50 mg total) by mouth daily. 90 tablet 2    Omeprazole 40 MG Oral Capsule Delayed Release Take 1 capsule (40 mg total) by mouth 2 (two) times daily before meals. 90 capsule 3    Pancrelipase, Lip-Prot-Amyl, (CREON) 59226-05289 units Oral Cap DR Particles Take 2 capsules with meals and 1 capsule with snacks 240 capsule 0    metoprolol succinate ER 50 MG Oral Tablet 24 Hr TAKE 1 TABLET BY MOUTH EVERY DAY 90 tablet 1    prochlorperazine (COMPAZINE) 10 mg tablet Take 1 tablet (10 mg total) by mouth every 6 (six) hours as needed for Nausea. (Patient not taking: Reported on 2/5/2024) 30 tablet 3    sertraline 100 MG Oral Tab Take 1 tablet (100 mg total) by mouth daily. (Patient not taking: Reported on 2/5/2024) 90 tablet 1     Current Facility-Administered Medications on File Prior to Visit   Medication Dose Route Frequency Provider Last Rate Last Admin    [COMPLETED] morphINE PF 4 MG/ML injection 4 mg  4 mg Intravenous Once Jose E Chung MD   4 mg at 01/29/24 0447    [COMPLETED] ondansetron (Zofran) 4 MG/2ML injection 4 mg  4 mg Intravenous Once Jose E Chung MD   4 mg at 01/29/24 0447    [COMPLETED] morphINE PF 4 MG/ML injection 4 mg  4 mg Intravenous Once Jose E Chung MD   4 mg at 01/29/24 0647    [COMPLETED] iopamidol 76% (ISOVUE-370) injection for power injector  75 mL Intravenous ONCE PRN Jose Roberto Myles DO   75 mL at 01/29/24 1148    [COMPLETED] ceFAZolin (Ancef) 2 g in 20mL IV syringe premix  2 g Intravenous On Call Vasquez Alexis MD   1 g at 01/25/24 1250    [COMPLETED] mupirocin (Bactroban) 2% nasal ointment             [COMPLETED] acetaminophen (Tylenol Extra Strength) tab 1,000 mg  1,000 mg Oral Once PRN Fátima Gallagher MD        Or    [COMPLETED] HYDROcodone-acetaminophen (Norco) 5-325 MG per tab 1 tablet  1 tablet Oral Once PRN Fátima Gallagher MD    1 tablet at 24 1547    Or    [COMPLETED] HYDROcodone-acetaminophen (Norco) 5-325 MG per tab 2 tablet  2 tablet Oral Once PRN Fátima Gallagher MD        [COMPLETED] prochlorperazine (Compazine) 10 MG/2ML injection             [COMPLETED] heparin (Porcine) 100 Units/mL lock flush 500 Units  5 mL Intracatheter Once Vasquez Alexis MD   500 Units at 24 1605    [COMPLETED] pembrolizumab (Keytruda) 400 mg in sodium chloride 0.9% 116 mL IVPB  400 mg Intravenous Once Grecia Angelo APRN   Stopped at 24 1016    [COMPLETED] trastuzumab-qyyp (Trazimera) 336 mg in sodium chloride 0.9% 266 mL infusion  4 mg/kg (Treatment Plan Recorded) Intravenous Once Grecia Angelo APRN   Stopped at 24 1054    [COMPLETED] ondansetron (Zofran) 4 MG/2ML injection             [COMPLETED] HYDROmorphone (Dilaudid) 1 MG/ML injection             [COMPLETED] heparin (Porcine) 100 Units/mL lock flush 500 Units  5 mL Intracatheter Once Charles Maguire MD   500 Units at 24 1104    [COMPLETED] HYDROmorphone (Dilaudid) 1 MG/ML injection             [] naloxone (Narcan) 0.4 MG/ML injection 0.08 mg  0.08 mg Intravenous Once PRN Junaid Evangelista DO        [COMPLETED] potassium chloride (K-Dur) tab 40 mEq  40 mEq Oral Once Jan Brito MD   40 mEq at 24 0833    [COMPLETED] morphINE PF 4 MG/ML injection 4 mg  4 mg Intravenous Once Jose E Chung MD   4 mg at 24 2003    [] sodium chloride 0.9% infusion   Intravenous Continuous Jose E Chung MD   New Bag at 24 1002    [] morphINE PF 4 MG/ML injection 4 mg  4 mg Intravenous Q30 Min PRN Jose E Chung MD        [] ondansetron (Zofran) 4 MG/2ML injection 4 mg  4 mg Intravenous Q4H PRN Jose E Chung MD        [COMPLETED] potassium chloride 40 mEq in 250mL sodium chloride 0.9% IVPB premix  40 mEq Intravenous Once Jose E Chung MD 62.5 mL/hr at 24 40 mEq at 24    [COMPLETED] morphINE PF 4  MG/ML injection 4 mg  4 mg Intravenous Once Jose E Cuhng MD   4 mg at 01/08/24 7486    [COMPLETED] HYDROcodone-acetaminophen (Norco) 5-325 MG per tab 1 tablet  1 tablet Oral Once Jose E Chung MD   1 tablet at 01/08/24 5766     Past Medical History:   Diagnosis Date    Back problem     Belching 02/01/2022    Black stools 02/01/2022    Borderline diabetes     Dx in 8/2013 - HgA1C 6.2%    C. difficile diarrhea 10/23/2022    pt treated and without symptoms    Chest pain 02/01/2022    Coronary artery disease     On 8/16/13: CABG x 4 with LIMA to LAD and SVG to diagonal, OM and PDA    Decorative tattoo 01/01/2000    Depression     Difficult intubation     h/o esophagectomy for CA; developed esophagobronchial vistula    Disorder of liver     LIVER CA    Esophageal cancer (HCC)     completed chemo    Esophageal reflux     Essential hypertension     Exposure to medical diagnostic radiation     last tx 8/18/2022    Frequent urination 01/01/2013    Gastroparesis     Heartburn 02/01/2022    High blood pressure     High cholesterol 08/01/2013    Found when I had quadruple bypass    History of COVID-19 10/16/2022    asymptomatic - pt was dx during a hospitalization for another diagnosis. No continued symptoms    History of stomach ulcers     Hyperlipidemia     Hyperlipidemia LDL goal < 70     Indigestion 02/01/2022    Morbid obesity with BMI of 40.0-44.9, adult (HCC)     Muscle weakness     Nontoxic multinodular goiter     Dx in 8/2013: pt was told that imaging showed thyroid cysts per PCP    Pancreatic cancer (HCC)     last dose 12/4/2023 is scheduled for another round 12/27/23    Peripheral vascular disease (HCC)     pt denies    Personal history of antineoplastic chemotherapy     for esophageal cancer/completed    Personal history of antineoplastic chemotherapy 12/2023    pancreatic cancer    Problems with swallowing 02/01/2022    Pulmonary nodules     Dx in 8/2013: CT chest showed small bilateral fissural-based lung  nodules less than 1 cm    S/P CABG x 4     On 8/16/13: CABG x 4 with LIMA to LAD and SVG to diagonal, OM and PDA    Shortness of breath     when coughing; no oxygen    Vomiting 02/01/2022     Past Surgical History:   Procedure Laterality Date    APPENDECTOMY      APPENDECTOMY      ARTHROSCOPY OF JOINT UNLISTED      right shoulder    CABG      On 8/16/13: CABG x 4 with LIMA to LAD and SVG to diagonal, OM and PDA    CARDIAC CATH LAB      On 8/14/2013: cardiac cath showed 3-vessel disease    OTHER SURGICAL HISTORY      1.       Laparoscopic robotic-assisted esophagogastrectomy.     Social History     Socioeconomic History    Marital status:     Number of children: 3   Occupational History    Occupation: works as  - on workman's comp   Tobacco Use    Smoking status: Former     Packs/day: 0.00     Years: 27.00     Additional pack years: 0.00     Total pack years: 0.00     Types: Cigarettes     Quit date: 8/15/2013     Years since quitting: 10.4    Smokeless tobacco: Never    Tobacco comments:     Quit smoking 2013   Vaping Use    Vaping Use: Never used   Substance and Sexual Activity    Alcohol use: Not Currently    Drug use: Never    Sexual activity: Not Currently     Partners: Female      Family History   Problem Relation Age of Onset    Cancer Mother         breast and colon     Diabetes Neg        Physical Exam  Height: 187.5 cm (6' 1.82\") (02/05 1008)  Weight: 78.9 kg (174 lb) (02/05 1008)  BSA (Calculated - sq m): 2.04 sq meters (02/05 1008)  Pulse: 58 (02/05 1008)  BP: 147/91 (02/05 1008)  Temp: 97.1 °F (36.2 °C) (02/05 1008)  Do Not Use - Resp Rate: --  SpO2: 97 % (02/05 1008)     General: NAD, AOX3  HEENT: clear op, mmm, no jvd, no scleral icterus  CV: RRR  Extremities: No edema   Lungs:  no increased work of breathing  Abd: soft nt nd +BS no hepatosplenomegaly  Neuro: CN: II-XII grossly intact    Results:  Lab Results   Component Value Date    WBC 5.7 02/05/2024    HGB 11.4 (L) 02/05/2024     HCT 36.8 (L) 02/05/2024    MCV 94.1 02/05/2024    .0 02/05/2024    EOSABS 0.19 12/19/2022     Lab Results   Component Value Date     01/30/2024    K 4.5 01/30/2024    CO2 32.0 01/30/2024     01/30/2024    BUN 10 01/30/2024    PHOS 3.5 11/27/2023    ALB 2.7 (L) 01/29/2024       Lab Results   Component Value Date     12/19/2023       Radiology: reviewed     Pathology: reviewed   CT Guided Lung Biopsy 1/26/23  The case is that of a 52-year-old man with a history of stage IIIB esophageal adenocarcinoma who is status-post laparoscopic robotic-assisted esophagogastrectomy and neoadjuvant carbo-Taxol (completed in August of 2022). More recently, he presented with chest pain and bilateral upper quadrant abdominal pain, with some associated nausea and hematemesis which prompted a chest CT. This imaging study revealed a triangular consolidation in the middle lobe of his right lung measuring 6.2 cm in maximal dimension. Given the concern for an underlying metastatic process, the lesion was sampled.     Tissue sections of the needle core biopsy specimen exhibit a diffuse proliferation of bland spindle cells with associated chronic inflammation. Overall, the proliferation has a vaguely granulomatous quality to it. lmmunohistochemica\ studies, were performed by the referring institution and showed only scattered reactivity with antibodies directed against SMA. Significant reactivity was not observed with antibodies directed against ALK, CD34, STAT6, S100, CDX2, SOX10, or desmin. Rare cells were positive with Carlito keratin and AE1/AE3.  and CD45 highlighted scattered cells throughout. Additional immunohistochemica\ stains were performed at [facility], and are detailed above.     The nature of this proliferation is not immediately clear. Certainly a granulomatous process related to underlying infection, drug reactions or an autoimmune phenomenon are possible. The negative special stains for  microorganisms reduced, but do not eliminate the possibility of an infectious etiology. A histiocytic neoplasm was also considered, although the above described findings do not fit neatly into a particular category of histiocytic neoplasm. It is certainly possible that a larger tissue sampling may provide important diagnostic clues, in terms of the overall growth and interface of this tissue with the adjacent normal tissue. Ultimately, this may provide the best chance for a more definitive diagnosis.\"    EGD/EUS from 4/27/23  Final Diagnosis:   A.  Esophageal anastomotic ulcer biopsies:  -Fragments of reactive esophageal squamous mucosa, with few intraepithelial eosinophils, and area of ulceration/erosion with acute and chronic inflammation.  -Peak eosinophil number up to 23 /hpf.  -No evidence of malignancy.  -No glandular component.     B.  Esophageal biopsies:  -Benign glandular tissue with mild acute and chronic inflammation.  -No intestinal metaplasia, dysplasia, or malignancy.  -No squamous component.     C.  Pancreatic head mass, fine-needle biopsies:  -Positive for adenocarcinoma.  -See comment.     D.  Pancreatic head mass, fine-needle biopsies:  -Positive for malignancy, adenocarcinoma, within fibrous tissue.  -Background pancreatic tissue.  -See comment.        IHC showed tumor positive for CDX2, CK7, CK19 and focally positive for p40 and CK20.  Comparison of the histology of [id number] from the original esophageal adenocarcinoma and the present tumor in parts C and D show some histologic similarity, but the IHC pattern is still fairly non-specific.  Further clinical correlation needed.  Dr. Kelin Coleman also reviewed and concurred.    Assessment and Plan:  53M with a PMH of CAD status post CABG, HLD and HTN was referred by Dr. Yadav for esophageal cancer.    Metastatic Esophageal Cancer, HER2+  -He had a good response to FOLFOX + immunotherapy + Herceptin. While on maintenance immunotherapy +  herceptin he developed progressive disease. Given progression, we are planning on restarting chemotherapy (FOLFOX) today.  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 Hopefully hemoptysis resolves soon.     Neuropathy: stable     [facility]

## 2024-02-05 ENCOUNTER — OFFICE VISIT (OUTPATIENT)
Dept: HEMATOLOGY/ONCOLOGY | Age: 54
End: 2024-02-05
Attending: INTERNAL MEDICINE
Payer: COMMERCIAL

## 2024-02-05 ENCOUNTER — SOCIAL WORK SERVICES (OUTPATIENT)
Dept: HEMATOLOGY/ONCOLOGY | Facility: HOSPITAL | Age: 54
End: 2024-02-05

## 2024-02-05 VITALS
RESPIRATION RATE: 18 BRPM | OXYGEN SATURATION: 97 % | DIASTOLIC BLOOD PRESSURE: 91 MMHG | HEART RATE: 58 BPM | BODY MASS INDEX: 22.33 KG/M2 | HEIGHT: 73.82 IN | SYSTOLIC BLOOD PRESSURE: 147 MMHG | WEIGHT: 174 LBS | TEMPERATURE: 97 F

## 2024-02-05 DIAGNOSIS — C15.5 MALIGNANT NEOPLASM OF LOWER THIRD OF ESOPHAGUS (HCC): Primary | ICD-10-CM

## 2024-02-05 LAB
ALBUMIN SERPL-MCNC: 3.1 G/DL (ref 3.4–5)
ALBUMIN/GLOB SERPL: 0.7 {RATIO} (ref 1–2)
ALP LIVER SERPL-CCNC: 140 U/L
ALT SERPL-CCNC: 37 U/L
ANION GAP SERPL CALC-SCNC: 4 MMOL/L (ref 0–18)
AST SERPL-CCNC: 26 U/L (ref 15–37)
BASOPHILS # BLD AUTO: 0.04 X10(3) UL (ref 0–0.2)
BASOPHILS NFR BLD AUTO: 0.7 %
BILIRUB SERPL-MCNC: 0.4 MG/DL (ref 0.1–2)
BUN BLD-MCNC: 10 MG/DL (ref 9–23)
CALCIUM BLD-MCNC: 8.8 MG/DL (ref 8.5–10.1)
CANCER AG19-9 SERPL-ACNC: 679.7 U/ML (ref ?–37)
CHLORIDE SERPL-SCNC: 106 MMOL/L (ref 98–112)
CO2 SERPL-SCNC: 29 MMOL/L (ref 21–32)
CREAT BLD-MCNC: 0.62 MG/DL
EGFRCR SERPLBLD CKD-EPI 2021: 114 ML/MIN/1.73M2 (ref 60–?)
EOSINOPHIL # BLD AUTO: 0.27 X10(3) UL (ref 0–0.7)
EOSINOPHIL NFR BLD AUTO: 4.7 %
ERYTHROCYTE [DISTWIDTH] IN BLOOD BY AUTOMATED COUNT: 14.6 %
GLOBULIN PLAS-MCNC: 4.6 G/DL (ref 2.8–4.4)
GLUCOSE BLD-MCNC: 122 MG/DL (ref 70–99)
HCT VFR BLD AUTO: 36.8 %
HGB BLD-MCNC: 11.4 G/DL
IMM GRANULOCYTES # BLD AUTO: 0.22 X10(3) UL (ref 0–1)
IMM GRANULOCYTES NFR BLD: 3.8 %
LYMPHOCYTES # BLD AUTO: 0.5 X10(3) UL (ref 1–4)
LYMPHOCYTES NFR BLD AUTO: 8.7 %
MCH RBC QN AUTO: 29.2 PG (ref 26–34)
MCHC RBC AUTO-ENTMCNC: 31 G/DL (ref 31–37)
MCV RBC AUTO: 94.1 FL
MONOCYTES # BLD AUTO: 0.49 X10(3) UL (ref 0.1–1)
MONOCYTES NFR BLD AUTO: 8.6 %
NEUTROPHILS # BLD AUTO: 4.21 X10 (3) UL (ref 1.5–7.7)
NEUTROPHILS # BLD AUTO: 4.21 X10(3) UL (ref 1.5–7.7)
NEUTROPHILS NFR BLD AUTO: 73.5 %
OSMOLALITY SERPL CALC.SUM OF ELEC: 288 MOSM/KG (ref 275–295)
PLATELET # BLD AUTO: 240 10(3)UL (ref 150–450)
POTASSIUM SERPL-SCNC: 3.9 MMOL/L (ref 3.5–5.1)
PROT SERPL-MCNC: 7.7 G/DL (ref 6.4–8.2)
RBC # BLD AUTO: 3.91 X10(6)UL
SODIUM SERPL-SCNC: 139 MMOL/L (ref 136–145)
WBC # BLD AUTO: 5.7 X10(3) UL (ref 4–11)

## 2024-02-05 PROCEDURE — 96415 CHEMO IV INFUSION ADDL HR: CPT

## 2024-02-05 PROCEDURE — 96375 TX/PRO/DX INJ NEW DRUG ADDON: CPT

## 2024-02-05 PROCEDURE — 99215 OFFICE O/P EST HI 40 MIN: CPT | Performed by: INTERNAL MEDICINE

## 2024-02-05 PROCEDURE — 85025 COMPLETE CBC W/AUTO DIFF WBC: CPT

## 2024-02-05 PROCEDURE — 86301 IMMUNOASSAY TUMOR CA 19-9: CPT

## 2024-02-05 PROCEDURE — 96368 THER/DIAG CONCURRENT INF: CPT

## 2024-02-05 PROCEDURE — 96417 CHEMO IV INFUS EACH ADDL SEQ: CPT

## 2024-02-05 PROCEDURE — 96413 CHEMO IV INFUSION 1 HR: CPT

## 2024-02-05 PROCEDURE — 80053 COMPREHEN METABOLIC PANEL: CPT

## 2024-02-05 RX ORDER — FLUOROURACIL 50 MG/ML
2400 INJECTION, SOLUTION INTRAVENOUS CONTINUOUS
Status: CANCELLED | OUTPATIENT
Start: 2024-02-05

## 2024-02-05 RX ORDER — FLUOROURACIL 50 MG/ML
2400 INJECTION, SOLUTION INTRAVENOUS CONTINUOUS
Status: DISCONTINUED | OUTPATIENT
Start: 2024-02-05 | End: 2024-02-05

## 2024-02-05 RX ADMIN — FLUOROURACIL 5000 MG: 50 INJECTION, SOLUTION INTRAVENOUS at 14:38:00

## 2024-02-05 NOTE — PROGRESS NOTES
Patient here for follow-up and planned C15D1 treatment. Still having a cough with hemoptysis. Still having dysphagia, pain, and increased phlegm/sputum production. Had recent cardiac procedure.

## 2024-02-05 NOTE — PROGRESS NOTES
SW met with pt to provide support and encouragement. Pt shared that his disability income has decreased which has impacted his overall stress. Support given and community support organizations that offer support discussed. [facility] information sheet given and pt requested [facility] information to be sent via [employer].    SW provided a Bringing Adriane card and encouraged use of community supports.    SHARON LittleW   at [address], [place] [address]  Ph: [phone] [email or web address]  Fax: [phone]

## 2024-02-05 NOTE — PROGRESS NOTES
Oncology Nutrition F/U Consultation     Patient Name: Dontae Cortez Jr.  YOB: 1970  Medical Record Number: [id number]            Account Number: [id number]  Dietitian: Kayla Hagen RD, LDN     Date of visit:  2/5/2024     Diet Rx: high protein/calorie, post-esophagectomy diet     Pertinent Dx/PMH: Esophageal cancer; pancreatic (head) cancer       TX: FOLFOX; switched to Herceptin/Immunotherapy maintenance on 9/25/23 due to negative signatera; q2 week 5-FU + Herceptin + q6 week Keytruda. Oxaliplatin was held on C7 due to neuropathy      1. Neoadjuvant chemoRT with carbo-taxol: 7/6/22-8/10/22  2. laparoscopic robotic-assisted esophagogastrectomy with feeding jejunostomy tube placement on 9/30/2022. Complications w/ esophageal leak  3. G-POEM (EGD and pylorectomy) 12/27/22     Other pertinent subjective/objective information: 1/17/24: EGD with fistula closure with ASD occluder and removal of the bronchial stent ; 1/8/24-1/13/24 for persistent dysphagia and bronchesophageal stent. Plan is for an ASD occluder and removal of the bronchial stent as an outpatient     1/15/24: Pt meeting definition for SEVERE MALNUTRITION in the context of acute illness as evidenced by 7% unplanned wt loss x 4 weeks and po intake meeting less than 50% estimated nutrition needs.      Other pertinent subjective/objective information: diet/sx hx obtained     Pertinent Meds:     Current Outpatient Medications:     ondansetron (ZOFRAN) 8 MG tablet, Take 1 tablet (8 mg total) by mouth every 8 (eight) hours as needed for Nausea., Disp: 30 tablet, Rfl: 3    prochlorperazine (COMPAZINE) 10 mg tablet, Take 1 tablet (10 mg total) by mouth every 6 (six) hours as needed for Nausea., Disp: 30 tablet, Rfl: 3    HYDROcodone-acetaminophen 5-325 MG Oral Tab, Take 1-2 tablets by mouth every 4 (four) hours as needed for Pain., Disp: 90 tablet, Rfl: 0    vancomycin 125 MG Oral Cap, Take 1 capsule (125 mg total) by mouth in the morning and  1 capsule (125 mg total) before bedtime., Disp: , Rfl:     cetirizine 5 MG Oral Tab, Take 1 tablet (5 mg total) by mouth daily., Disp: , Rfl:     sucralfate (CARAFATE) 1 g Oral Tab, Take 1 tablet (1 g total) by mouth 3 (three) times daily as needed., Disp: 60 tablet, Rfl: 3    losartan 50 MG Oral Tab, Take 1 tablet (50 mg total) by mouth daily., Disp: 90 tablet, Rfl: 2    benzonatate 100 MG Oral Cap, Take 1 capsule (100 mg total) by mouth 3 (three) times daily as needed for cough., Disp: 30 capsule, Rfl: 0    Omeprazole 40 MG Oral Capsule Delayed Release, Take 1 capsule (40 mg total) by mouth 2 (two) times daily before meals., Disp: 90 capsule, Rfl: 3    sertraline 100 MG Oral Tab, Take 1 tablet (100 mg total) by mouth daily., Disp: 90 tablet, Rfl: 1    Pancrelipase, Lip-Prot-Amyl, (CREON) 86407-01180 units Oral Cap DR Particles, Take 2 capsules with meals and 1 capsule with snacks, Disp: 240 capsule, Rfl: 0    metoprolol succinate ER 50 MG Oral Tablet 24 Hr, TAKE 1 TABLET BY MOUTH EVERY DAY (Patient taking differently: Take 1 tablet (50 mg total) by mouth daily.), Disp: 90 tablet, Rfl: 1     Pertinent Labs: noted     Height: 6'1.82\"                     IBW: 190 +/- 10%     WT HX:   02/05/24 78.9 kg (174 lb)   01/23/24 80.3 kg (177 lb)   01/22/24 80.5 kg (177 lb 8 oz)   01/17/24 77.6 kg (171 lb)   01/16/24 78.5 kg (173 lb)   01/15/24 77.6 kg (171 lb)   01/08/24 81.6 kg (180 lb)   12/29/23 81.6 kg (180 lb)   12/29/23 83.5 kg (184 lb)   12/26/23 83.5 kg (184 lb)         Estimated Nutrition Needs: 30-35 kcals/kg = 2553-4184 KCALS/d; 1.5 gms protein/kg = 120 gms/d     Assessment/Plan: RD f/u w/ pt in tx area today.     Pt c/o hemoptysis showing RD picture of. Pt continues to c/o feelings of aspiration w/ thin liquids such as coffee and H2O; however, states he tolerates carbonated beverages and lactose-free milkshakes. He continues to experiment w/ foods tolerating some but not others (tolerates [employer] hamburger but  not [employer]).    RD suggested pt may want to try adding thickener to thin liquids w/ pt stating he would try. Pt also requesting to try Ensure Plus strawberry again to assess tolerance w/ RD providing samples.     He expressed concern re: limited funds w/ RD noting would alert SW.     RD offered support and will continue to follow.      The 21st Century Cures Act makes medical notes like these available to patients in the interest of transparency. Please be advised this is a medical document. Medical documents are intended to carry relevant information, facts as evident, and the clinical opinion of the practitioner. The medical note is intended as peer to peer communication and may appear blunt or direct. It is written in medical language and may contain abbreviations or verbiage that are unfamiliar.

## 2024-02-06 RX ORDER — BACLOFEN 10 MG/1
10 TABLET ORAL 3 TIMES DAILY PRN
Qty: 90 TABLET | Refills: 0 | OUTPATIENT
Start: 2024-02-06

## 2024-02-08 ENCOUNTER — TELEPHONE (OUTPATIENT)
Dept: HEMATOLOGY/ONCOLOGY | Facility: HOSPITAL | Age: 54
End: 2024-02-08

## 2024-02-08 DIAGNOSIS — C15.5 MALIGNANT NEOPLASM OF LOWER THIRD OF ESOPHAGUS (HCC): Primary | ICD-10-CM

## 2024-02-08 DIAGNOSIS — C15.9 MALIGNANT NEOPLASM OF ESOPHAGUS, UNSPECIFIED LOCATION (HCC): ICD-10-CM

## 2024-02-08 RX ORDER — HYDROCODONE BITARTRATE AND ACETAMINOPHEN 5; 325 MG/1; MG/1
1-2 TABLET ORAL EVERY 4 HOURS PRN
Qty: 90 TABLET | Refills: 0 | OUTPATIENT
Start: 2024-02-08

## 2024-02-12 ENCOUNTER — TELEPHONE (OUTPATIENT)
Dept: FAMILY MEDICINE CLINIC | Facility: CLINIC | Age: 54
End: 2024-02-12

## 2024-02-12 ENCOUNTER — TELEPHONE (OUTPATIENT)
Dept: HEMATOLOGY/ONCOLOGY | Facility: HOSPITAL | Age: 54
End: 2024-02-12

## 2024-02-12 NOTE — TELEPHONE ENCOUNTER
Ronen [phone] I'm waiting on someone to give me a pain medication doctor to see. No one has called me. Thanks Janett

## 2024-02-12 NOTE — TELEPHONE ENCOUNTER
Problems   The patient or proxy has not reviewed this information.     Medications   The patient or proxy has not reviewed this information, and there are updates pending:   Requested Medication Removals Start Date Reported By  Comments   HYDROcodone-acetaminophen 5-325 MG Tabs 1/31/2024 Ronen Cortez Jr.    baclofen 10 MG Tabs 1/31/2024 Ronen Cortez Jr.    cetirizine 5 MG Tabs  Ronen Cortez Jr.      Allergies   The patient or proxy has not reviewed this information.

## 2024-02-13 ENCOUNTER — TELEPHONE (OUTPATIENT)
Facility: CLINIC | Age: 54
End: 2024-02-13

## 2024-02-13 PROBLEM — Z51.5 PALLIATIVE CARE BY SPECIALIST: Status: ACTIVE | Noted: 2024-02-13

## 2024-02-13 PROBLEM — Z51.5 PALLIATIVE CARE BY SPECIALIST: Status: ACTIVE | Noted: 2024-01-01

## 2024-02-13 PROBLEM — G89.3 NEOPLASM RELATED PAIN: Status: ACTIVE | Noted: 2024-02-13

## 2024-02-13 PROBLEM — G89.3 NEOPLASM RELATED PAIN: Status: ACTIVE | Noted: 2024-01-01

## 2024-02-13 RX ORDER — BENZONATATE 100 MG/1
100 CAPSULE ORAL 3 TIMES DAILY PRN
Qty: 90 CAPSULE | Refills: 0 | Status: SHIPPED | OUTPATIENT
Start: 2024-02-13

## 2024-02-13 RX ORDER — BENZONATATE 100 MG/1
100 CAPSULE ORAL 3 TIMES DAILY PRN
Qty: 30 CAPSULE | Refills: 0 | OUTPATIENT
Start: 2024-02-13

## 2024-02-13 NOTE — TELEPHONE ENCOUNTER
Per Dr. [place]. He should f/u with me or trevor though because he's going to need a bronch at some point. Pt called and made aware. Willing to schedule follow up appt.

## 2024-02-14 ENCOUNTER — OFFICE VISIT (OUTPATIENT)
Facility: CLINIC | Age: 54
End: 2024-02-14
Payer: COMMERCIAL

## 2024-02-14 ENCOUNTER — NURSE ONLY (OUTPATIENT)
Dept: HEMATOLOGY/ONCOLOGY | Facility: HOSPITAL | Age: 54
End: 2024-02-14
Attending: FAMILY MEDICINE
Payer: COMMERCIAL

## 2024-02-14 VITALS
WEIGHT: 177 LBS | OXYGEN SATURATION: 97 % | RESPIRATION RATE: 16 BRPM | SYSTOLIC BLOOD PRESSURE: 138 MMHG | DIASTOLIC BLOOD PRESSURE: 80 MMHG | HEART RATE: 51 BPM | HEIGHT: 73.82 IN | BODY MASS INDEX: 22.72 KG/M2

## 2024-02-14 DIAGNOSIS — J86.0: Primary | ICD-10-CM

## 2024-02-14 DIAGNOSIS — R05.3 CHRONIC COUGH: ICD-10-CM

## 2024-02-14 DIAGNOSIS — R91.8 PULMONARY NODULES: ICD-10-CM

## 2024-02-14 DIAGNOSIS — C15.5 MALIGNANT NEOPLASM OF LOWER THIRD OF ESOPHAGUS (HCC): Primary | ICD-10-CM

## 2024-02-14 LAB
ALBUMIN SERPL-MCNC: 3.2 G/DL (ref 3.4–5)
ALBUMIN/GLOB SERPL: 0.8 {RATIO} (ref 1–2)
ALP LIVER SERPL-CCNC: 116 U/L
ALT SERPL-CCNC: 27 U/L
ANION GAP SERPL CALC-SCNC: 7 MMOL/L (ref 0–18)
AST SERPL-CCNC: 21 U/L (ref 15–37)
BASOPHILS # BLD AUTO: 0.03 X10(3) UL (ref 0–0.2)
BASOPHILS NFR BLD AUTO: 0.5 %
BILIRUB SERPL-MCNC: 0.3 MG/DL (ref 0.1–2)
BUN BLD-MCNC: 8 MG/DL (ref 9–23)
CALCIUM BLD-MCNC: 9.1 MG/DL (ref 8.5–10.1)
CANCER AG19-9 SERPL-ACNC: 846.6 U/ML (ref ?–37)
CHLORIDE SERPL-SCNC: 104 MMOL/L (ref 98–112)
CO2 SERPL-SCNC: 28 MMOL/L (ref 21–32)
CREAT BLD-MCNC: 0.58 MG/DL
EGFRCR SERPLBLD CKD-EPI 2021: 116 ML/MIN/1.73M2 (ref 60–?)
EOSINOPHIL # BLD AUTO: 0.25 X10(3) UL (ref 0–0.7)
EOSINOPHIL NFR BLD AUTO: 4.4 %
ERYTHROCYTE [DISTWIDTH] IN BLOOD BY AUTOMATED COUNT: 14.7 %
GLOBULIN PLAS-MCNC: 4 G/DL (ref 2.8–4.4)
GLUCOSE BLD-MCNC: 92 MG/DL (ref 70–99)
HCT VFR BLD AUTO: 34 %
HGB BLD-MCNC: 10.7 G/DL
IMM GRANULOCYTES # BLD AUTO: 0.09 X10(3) UL (ref 0–1)
IMM GRANULOCYTES NFR BLD: 1.6 %
LYMPHOCYTES # BLD AUTO: 0.93 X10(3) UL (ref 1–4)
LYMPHOCYTES NFR BLD AUTO: 16.5 %
MCH RBC QN AUTO: 29.6 PG (ref 26–34)
MCHC RBC AUTO-ENTMCNC: 31.5 G/DL (ref 31–37)
MCV RBC AUTO: 94.2 FL
MONOCYTES # BLD AUTO: 0.65 X10(3) UL (ref 0.1–1)
MONOCYTES NFR BLD AUTO: 11.5 %
NEUTROPHILS # BLD AUTO: 3.7 X10 (3) UL (ref 1.5–7.7)
NEUTROPHILS # BLD AUTO: 3.7 X10(3) UL (ref 1.5–7.7)
NEUTROPHILS NFR BLD AUTO: 65.5 %
OSMOLALITY SERPL CALC.SUM OF ELEC: 286 MOSM/KG (ref 275–295)
PLATELET # BLD AUTO: 181 10(3)UL (ref 150–450)
POTASSIUM SERPL-SCNC: 3.9 MMOL/L (ref 3.5–5.1)
PROT SERPL-MCNC: 7.2 G/DL (ref 6.4–8.2)
RBC # BLD AUTO: 3.61 X10(6)UL
SODIUM SERPL-SCNC: 139 MMOL/L (ref 136–145)
WBC # BLD AUTO: 5.7 X10(3) UL (ref 4–11)

## 2024-02-14 PROCEDURE — 3008F BODY MASS INDEX DOCD: CPT | Performed by: INTERNAL MEDICINE

## 2024-02-14 PROCEDURE — 3075F SYST BP GE 130 - 139MM HG: CPT | Performed by: INTERNAL MEDICINE

## 2024-02-14 PROCEDURE — 85025 COMPLETE CBC W/AUTO DIFF WBC: CPT

## 2024-02-14 PROCEDURE — 36591 DRAW BLOOD OFF VENOUS DEVICE: CPT

## 2024-02-14 PROCEDURE — 80053 COMPREHEN METABOLIC PANEL: CPT

## 2024-02-14 PROCEDURE — 99214 OFFICE O/P EST MOD 30 MIN: CPT | Performed by: INTERNAL MEDICINE

## 2024-02-14 PROCEDURE — 86301 IMMUNOASSAY TUMOR CA 19-9: CPT

## 2024-02-14 PROCEDURE — 3079F DIAST BP 80-89 MM HG: CPT | Performed by: INTERNAL MEDICINE

## 2024-02-14 NOTE — H&P (VIEW-ONLY)
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 Since he was last seen in the hospital in mid January post removal of endobronchial stent and placement of ASD occluder device in the bronchoesophageal fistula, he had coughing leading to his return to the hospital at the end of January but left AMA prior to being seen by the GI service. He notes has had continued episodes of coughing post eating/drinking which seem less than before the ASD occluder device was placed but still annoying him.  More recently has had mild pain in his lower right chest/ upper abdomen for the past week, seems to be worse with movement/bending or other events such as coughing.  Phlegm has been normal, rarely green.  Did have some blood several weeks ago. No fevers.      Past Medical History:   Past Medical History:   Diagnosis Date    Back problem     Belching 02/01/2022    Black stools 02/01/2022    Borderline diabetes     Dx in 8/2013 - HgA1C 6.2%    C. difficile diarrhea 10/23/2022    pt treated and without symptoms    Chest pain 02/01/2022    Coronary artery disease     On 8/16/13: CABG x 4 with LIMA to LAD and SVG to diagonal, OM and PDA    Decorative tattoo 01/01/2000    Depression     Difficult intubation     h/o esophagectomy for CA; developed esophagobronchial vistula    Disorder of liver     LIVER CA    Esophageal cancer (HCC)     completed chemo    Esophageal reflux     Essential hypertension     Exposure to medical diagnostic radiation     last tx 8/18/2022    Frequent urination 01/01/2013     Gastroparesis     Heartburn 02/01/2022    High blood pressure     High cholesterol 08/01/2013    Found when I had quadruple bypass    History of COVID-19 10/16/2022    asymptomatic - pt was dx during a hospitalization for another diagnosis. No continued symptoms    History of stomach ulcers     Hyperlipidemia     Hyperlipidemia LDL goal < 70     Indigestion 02/01/2022    Morbid obesity with BMI of 40.0-44.9, adult (HCC)     Muscle weakness     Nontoxic multinodular goiter     Dx in 8/2013: pt was told that imaging showed thyroid cysts per PCP    Pancreatic cancer (HCC)     last dose 12/4/2023 is scheduled for another round 12/27/23    Peripheral vascular disease (HCC)     pt denies    Personal history of antineoplastic chemotherapy     for esophageal cancer/completed    Personal history of antineoplastic chemotherapy 12/2023    pancreatic cancer    Problems with swallowing 02/01/2022    Pulmonary nodules     Dx in 8/2013: CT chest showed small bilateral fissural-based lung nodules less than 1 cm    S/P CABG x 4     On 8/16/13: CABG x 4 with LIMA to LAD and SVG to diagonal, OM and PDA    Shortness of breath     when coughing; no oxygen    Vomiting 02/01/2022        Past Surgical History:   Past Surgical History:   Procedure Laterality Date    APPENDECTOMY      APPENDECTOMY      ARTHROSCOPY OF JOINT UNLISTED      right shoulder    CABG      On 8/16/13: CABG x 4 with LIMA to LAD and SVG to diagonal, OM and PDA    CARDIAC CATH LAB      On 8/14/2013: cardiac cath showed 3-vessel disease    OTHER SURGICAL HISTORY      1.       Laparoscopic robotic-assisted esophagogastrectomy.       Family Medical History:   Family History   Problem Relation Age of Onset    Cancer Mother         breast and colon     Diabetes Neg         Social History:   Social History     Socioeconomic History    Marital status:      Spouse name: Not on file    Number of children: 3    Years of education: Not on file    Highest education level:  Not on file   Occupational History    Occupation: works as  - on workman's comp   Tobacco Use    Smoking status: Former     Packs/day: 1.00     Years: 27.00     Additional pack years: 0.00     Total pack years: 27.00     Types: Cigarettes     Quit date: 8/15/2013     Years since quitting: 10.5    Smokeless tobacco: Never    Tobacco comments:     Quit smoking 2013   Vaping Use    Vaping Use: Never used   Substance and Sexual Activity    Alcohol use: Not Currently    Drug use: Never    Sexual activity: Not Currently     Partners: Female   Other Topics Concern     Service Not Asked    Blood Transfusions Not Asked    Caffeine Concern Yes    Occupational Exposure Not Asked    Hobby Hazards Not Asked    Sleep Concern Not Asked    Stress Concern No    Weight Concern No    Special Diet No    Back Care Not Asked    Exercise No    Bike Helmet Not Asked    Seat Belt No    Self-Exams Not Asked   Social History Narrative    Lives with life partner    Has 3 daughters - 1 lives closeby, 1 in [place], 1 in [place]     Social Determinants of Health     Financial Resource Strain: Low Risk  (12/14/2023)    Financial Resource Strain     Difficulty of Paying Living Expenses: Not very hard     Med Affordability: No   Food Insecurity: No Food Insecurity (1/17/2024)    Food Insecurity     Food Insecurity: Never true   Transportation Needs: No Transportation Needs (1/17/2024)    Transportation Needs     Lack of Transportation: No   Physical Activity: Not on file   Stress: Not on file   Social Connections: Not on file   Housing Stability: Low Risk  (1/17/2024)    Housing Stability     Housing Instability: No     Housing Instability Emergency: No        Medications:   Current Outpatient Medications   Medication Sig Dispense Refill    gabapentin 300 MG Oral Cap Take 1 capsule (300 mg total) by mouth in the morning and 1 capsule (300 mg total) before bedtime. 180 capsule 0    morphINE ER 15 MG Oral Tab CR Take 1 tablet (15 mg total)  by mouth every 12 (twelve) hours. 60 tablet 0    HYDROcodone-acetaminophen 5-325 MG Oral Tab Take 1-2 tablets by mouth every 4 (four) hours as needed for Pain. 60 tablet 0    benzonatate 100 MG Oral Cap Take 1 capsule (100 mg total) by mouth 3 (three) times daily as needed for cough. 90 capsule 0    sertraline 100 MG Oral Tab Take 2 tablets (200 mg total) by mouth daily.      ondansetron (ZOFRAN) 8 MG tablet Take 1 tablet (8 mg total) by mouth every 8 (eight) hours as needed for Nausea. 30 tablet 3    losartan 50 MG Oral Tab Take 1 tablet (50 mg total) by mouth daily. 90 tablet 2    Omeprazole 40 MG Oral Capsule Delayed Release Take 1 capsule (40 mg total) by mouth 2 (two) times daily before meals. 90 capsule 3    Pancrelipase, Lip-Prot-Amyl, (CREON) 02034-27335 units Oral Cap DR Particles Take 2 capsules with meals and 1 capsule with snacks 240 capsule 0    metoprolol succinate ER 50 MG Oral Tablet 24 Hr TAKE 1 TABLET BY MOUTH EVERY DAY 90 tablet 1       Review of Systems: Review of Systems   Constitutional: Negative.    HENT: Negative.     Respiratory:  Positive for cough. Negative for shortness of breath, wheezing and stridor.    Cardiovascular:  Positive for chest pain.        Physical Exam:  /80 (BP Location: Left arm, Patient Position: Sitting, Cuff Size: adult)   Pulse 51   Resp 16   Ht 6' 1.82\" (1.875 m)   Wt 177 lb (80.3 kg)   SpO2 97%   BMI 22.84 kg/m²      Constitutional: alert, cooperative. No acute distress.  HEENT: Head NC/AT.    Cardio: Regular rate and rhythm. Normal S1 and S2. No murmurs.   Respiratory: Thorax symmetrical with no labored breathing. Diminished bs bilaterally. No wheeze. No distress  GI: NABS. Abd soft, non-tender.  Extremities: No clubbing or cyanosis. No BLE edema.    Neurologic: A&Ox3. No gross motor deficits.  Skin: Warm, dry  Psych: Calm, cooperative. Pleasant affect.    Results:  Personally reviewed    WBC: 5.7, done on 2/5/2024.  HGB: 11.4, done on 2/5/2024.  PLT:  240, done on 2/5/2024.     Glucose: 122, done on 2/5/2024.  Cr: 0.62, done on 2/5/2024.  GFR(AA): 120, done on 7/26/2022.  GFR (non-AA): 104, done on 7/26/2022.  CA: 8.8, done on 2/5/2024.  Na: 139, done on 2/5/2024.  K: 3.9, done on 2/5/2024.  Cl: 106, done on 2/5/2024.  CO2: 29, done on 2/5/2024.  Last ALB was 3.1% done on 2/5/2024.     CT CHEST(CONTRAST ONLY) (CPT=71260)    Result Date: 1/29/2024  CONCLUSION:  Relatively dilute contrast attenuation within the gastroesophageal pull-through, with no definite contrast entering the airways on this exam.  Mildly enlarged mediastinal lymph nodes.  Concern for pneumonia or aspiration in the right lower lobe.   LOCATION:  [id number]   Dictated by ([facility]): Ernesto Cabrera MD on 1/29/2024 at 12:03 PM     Finalized by (CST): Ernesto Cabrera MD on 1/29/2024 at 12:08 PM       XR CHEST PA + LAT CHEST (CPT=71046)    Result Date: 1/29/2024  CONCLUSION:  1. Small right-sided pleural effusion with right basilar atelectasis/infiltrates.  2. Preliminary report was provided by [facility] at time of examination.  Final report confirms findings on preliminary report without discrepancy.   LOCATION:  [facility]   Dictated by (CST): Jenny Navarrete MD on 1/29/2024 at 7:42 AM     Finalized by (CST): Jenny Navarrete MD on 1/29/2024 at 7:43 AM       XR CHEST AP/PA (1 VIEW) (CPT=71045)    Result Date: 1/26/2024  CONCLUSION:  1. Blunting the right costophrenic angle which may represent chronic pleural reaction versus tiny effusion.  2. Minimal right basilar atelectasis/infiltrates.    LOCATION:  [facility]      Dictated by (CST): Jenny Navarrete MD on 1/26/2024 at 10:46 AM     Finalized by (CST): Jenny Navarrete MD on 1/26/2024 at 10:51 AM       XR CHEST AP PORTABLE  (CPT=71045)    Result Date: 1/17/2024  CONCLUSION:  Postsurgical changes of median sternotomy.  Stable heart size and pulmonary vascularity.  Port-A-Cath tip SVC.  There is a small right pleural effusion unchanged.  Slight  improvement in interstitial prominence/infiltrates in the left upper lobe and lung bases when compared to prior exam.  No pneumothorax.   LOCATION:  [facility]      Dictated by (CST): Gayle Alegre MD on 1/17/2024 at 1:11 PM     Finalized by (CST): Gayle Alegre MD on 1/17/2024 at 1:13 PM       XR ENDO BILE DUCT (CPT=74328)    Result Date: 1/17/2024  CONCLUSION:  As above.   LOCATION:  [facility]   Dictated by (CST): Joseph Mcdaniels DO on 1/17/2024 at 12:41 PM     Finalized by (CST): Joseph Mcdaniels DO on 1/17/2024 at 12:43 PM       XR ESOPHAGUS SINGLE CONTRAST (CPT=74220)    Result Date: 1/9/2024  CONCLUSION:  Postsurgical changes of gastric pull-through with mild dysmotility.  No evidence of significant stricture or fistula.   LOCATION:  [facility]   Dictated by (CST): Ovidio Patel MD on 1/09/2024 at 12:28 PM     Finalized by (CST): Ovidio Patel MD on 1/09/2024 at 12:31 PM       XR VIDEO SWALLOW (CPT=74230)    Result Date: 1/9/2024  PROCEDURE:  XR VIDEO SWALLOW (CPT=74230)  TECHNIQUE:  Video fluoroscopic swallowing study was performed in cooperation with the speech pathologist.  Barium of varying consistencies was administered orally with patient in lateral projection.  COMPARISON:  [facility] , XR, XR VIDEO SWALLOW (CPT=74230), 1/02/2024, 8:58 AM.  INDICATIONS:  coughing/choking with PO liwuid intake  PATIENT STATED HISTORY: (As transcribed by Technologist)  The patient offered no additional history at this point.   CINE CAPTURES:  5 FLUORSCOPY TIME:  43 sec RADIATION DOSE (AIR KERMA PRODUCT):  22.6 uGy/m2   FINDINGS:  PHARYNGEAL PHASE:  Normal for age. ASPIRATION:  None. PENETRATION:  Normal. OTHER:  Negative.  PLEASE SEE SPEECH PATHOLOGIST REPORT FOR FULL ASSESSMENT OF SWALLOWING MECHANISM.   LOCATION:  [facility]    Dictated by (CST): Ovidio Patel MD on 1/09/2024 at 12:11 PM     Finalized by (CST): Ovidio Patel MD on 1/09/2024 at 12:11 PM       XR CHEST AP PORTABLE  (CPT=71045)    Result Date: 1/8/2024  CONCLUSION:  1. There is  some interval clearing of opacity from left upper lobe which is probably improved pneumonia. 2. Persistent right costophrenic angle blunting is consistent with effusion or atelectasis. 3. There is a stent in the right mainstem bronchus which is stable.   LOCATION:        Dictated by (CST): Kraig Tristan MD on 1/08/2024 at 7:31 PM     Finalized by (CST): Kraig Tristan MD on 1/08/2024 at 7:32 PM       PET STANDARD BODY SCAN (ONCOLOGY) (CPT=78815)    Result Date: 1/4/2024  CONCLUSION:  1. Diffuse ground-glass opacity in the left lung, which has intervally increased in comparison to 12/30/2023.  There is new FDG avid mediastinal lymphadenopathy, which may be reactive versus malignant.  Continued attention on follow-up is recommended.  Continued follow-up to resolution of the ground-glass opacities recommended. 2. New FDG avid hepatic and pancreatic lesions.  The uptake near the pancreatic uncinate process is similar to 4/10/2023, which had resolved on 7/27/2023 exam.  This is suspicious for recurrence of disease.   LOCATION:  [facility]    Dictated by (CST): Shar Simms MD on 1/04/2024 at 2:03 PM     Finalized by (CST): Shar Simms MD on 1/04/2024 at 2:59 PM       XR VIDEO SWALLOW (CPT=74230)    Result Date: 1/2/2024  PROCEDURE:  XR VIDEO SWALLOW (CPT=74230)  TECHNIQUE:  Video fluoroscopic swallowing study was performed in cooperation with the speech pathologist.  Barium of varying consistencies was administered orally with patient in lateral projection.  COMPARISON:  [facility] , XR, XR VIDEO SWALLOW (CPT=74230), 11/18/2022, 2:09 PM.  INDICATIONS:  Dysphagia  PATIENT STATED HISTORY: (As transcribed by Technologist)  patient states history of trouble swallowing and coughing while eating and drinking. EGD, esophageal stent removed, and UGI test done within the last few months without improvement to symptoms.   CINE CAPTURES:  7 FLUORSCOPY TIME:  1 minute 45 seconds RADIATION DOSE (AIR KERMA PRODUCT):  65.1 uGy/m2    FINDINGS:  No penetration or aspiration was seen with thin liquids, honey, nectar, purees, or solids.  PLEASE SEE SPEECH PATHOLOGIST REPORT FOR FULL ASSESSMENT OF SWALLOWING MECHANISM.   LOCATION:  [facility]    Dictated by (CST): Yahir To MD on 1/02/2024 at 9:18 AM     Finalized by (CST): Yahir To MD on 1/02/2024 at 9:19 AM       XR CHEST AP PORTABLE  (CPT=71045)    Result Date: 1/2/2024  CONCLUSION:  Bilateral interstitial infiltrates with interval worsening infiltrates in the left lung compared to the previous exam from 12/29/2023.  Differential considerations include atypical pneumonia and other inflammatory interstitial lung disease. 2. Cardiomegaly without edema.   LOCATION:  [facility]      Dictated by (CST): Cuong Wan MD on 1/02/2024 at 7:57 AM     Finalized by (CST): Cuong Wan MD on 1/02/2024 at 8:01 AM       CT CHEST+ABDOMEN+PELVIS(ALL CNTRST ONLY)(CPT=71260/40016)    Result Date: 12/30/2023  CONCLUSION:  1. Relative to the prior exam, there are increasing ground-glass opacities throughout the left upper lobe and to a lesser degree the left lower lobe, superimposed over diffuse reticular nodular opacities, suggestive of a progressive atypical pneumonia. 2. Suggestion of colitis involving the descending and rectosigmoid colon. 3. Please see the body of the report above for further, less significant details.  The preliminary report was reviewed.    LOCATION:  [facility]    Dictated by (CST): Joseph Mdcaniels DO on 12/30/2023 at 9:50 AM     Finalized by (CST): Joseph Mcdaniels DO on 12/30/2023 at 9:58 AM       XR CHEST AP PORTABLE  (CPT=71045)    Result Date: 12/29/2023  CONCLUSION:   Postoperative changes of CABG with stable cardiac and mediastinal contours.  Reticular opacities of the right lung base and patchy airspace disease of the left upper lobe compatible with infectious/inflammatory process, not substantially changed in the interim.  Small right pleural effusion is stable.  No pneumothorax.  Left  IJ chest port terminates at the cavoatrial junction.   LOCATION:  [facility]      Dictated by (CST): Richie Jay MD on 12/29/2023 at 4:40 PM     Finalized by (CST): Richie Jay MD on 12/29/2023 at 4:43 PM       XR CHEST PA + LAT CHEST (CPT=71046)    Result Date: 12/26/2023  CONCLUSION:  There is patchy consolidation in the left upper lobe that is concerning for pneumonia.  Clinical correlation recommended along with follow-up until complete resolution.  Small right pleural effusion.  Trace left pleural effusion.   LOCATION:  [facility]   Dictated by (CST): Immanuel Matthews MD on 12/26/2023 at 10:03 AM     Finalized by (CST): Immanuel Matthews MD on 12/26/2023 at 10:04 AM       XR UGI/ESOPHAGUS SINGLE CONTRAST (CPT=74240)    Result Date: 12/18/2023  CONCLUSION:   1.  No evidence of contrast extravasation to suggest anastomotic leak at this time.  2.  Moderate to severe gastroesophageal reflux.   LOCATION:  [facility]   Dictated by (CST): Ovidio Patel MD on 12/18/2023 at 10:55 AM     Finalized by (CST): Ovidio Patel MD on 12/18/2023 at 10:59 AM       CT CHEST PE AORTA (IV ONLY) (CPT=71260)    Result Date: 12/16/2023  CONCLUSION:   1. No evidence of pulmonary embolus.  2. Previously noted stent in gastric pull-through region is no longer identified.  3. Numerous scattered nodules throughout the lungs are again identified.  Minimal increased ground-glass densities in the left upper lobe are noted.  These are nonspecific.  Ground-glass nodular densities in left lower lobe are increased.  This measures approximately 2.2 x 2.0 cm.  2. Ground-glass consolidations in the lungs are mildly increased compared to prior examination.  This may be due to developing infectious process.  Numerous reticular nodular densities throughout the lungs are stable.    LOCATION:  [facility]   Dictated by (CST): Amilcar Jesus MD on 12/16/2023 at 10:01 PM     Finalized by (CST): Amilcar Jesus MD on 12/16/2023 at 10:21 PM       XR CHEST PA + LAT CHEST  (CPT=71046)    Result Date: 12/16/2023  CONCLUSION:  No new consolidation.  Blunting of the right costophrenic angle is stable.   LOCATION:  [facility]   Dictated by (CST): Amilcar Jesus MD on 12/16/2023 at 9:37 PM     Finalized by (CST): Amilcar Jseus MD on 12/16/2023 at 9:37 PM       XR GUIDE, GI DILATION (OUQ=[id number])    Result Date: 12/12/2023  CONCLUSION:   Solitary fluoroscopic image acquired during EGD procedure with visualization of endoscope and esophageal stent.  Please refer to dedicated procedure report for full detail.   LOCATION:  [facility]   Dictated by (CST): Richie Jay MD on 12/12/2023 at 5:58 PM     Finalized by (CST): Richie Jay MD on 12/12/2023 at 5:58 PM       CT ANGIOGRAPHY, CHEST (CPT=71275)    Result Date: 12/10/2023  CONCLUSION:   1. Negative for acute pulmonary embolism.  Widespread pulmonary nodules are redemonstrated.  No developing pleural effusion, or pneumothorax.  No acute lobar pneumonia seen.  2. Change in position of stent, now positioned in the thorax, previously partially at the hiatus.  3. Aberrant right subclavian artery.  At the current position of the stent, the aberrant subclavian artery crosses posterior to the esophagus about 1.6 cm above the upper margin of the stent.  If the stent migrates or is repositioned more superiorly, potentially the metal of the stent could erode into the aberrant right subclavian artery.     LOCATION:  [facility]   Dictated by (CST): Ernesto Cabrera MD on 12/10/2023 at 9:16 PM     Finalized by (CST): Ernesto Cabrera MD on 12/10/2023 at 9:26 PM       XR GUIDE, GI DILATION (HRZ=07532)    Result Date: 11/28/2023  CONCLUSION:  Fluoroscopic guidance for stent removal.   LOCATION:  [facility]   Dictated by (CST): Ovidio Patel MD on 11/28/2023 at 3:41 PM     Finalized by (CST): Ovidio Patel MD on 11/28/2023 at 3:42 PM       CT CHEST (RLA=20902)    Result Date: 11/27/2023  CONCLUSION:   1. Esophagectomy with gastric pull-through.  Stable positioning of  stents of the right mainstem bronchus and gastric pull-through.  No direct imaging evidence of persistent communication between the esophagogastric anastomosis and tracheobronchial tree.  2. Extensive tree-in-bud type opacities present within both lungs, most prevalent within the right middle and right lower lobes, morphology most consistent with infectious bronchiolitis or aspiration.  The extent of disease is similar compared to 11/26/2023.  Persistence of pulmonary disease may represent indirect evidence of fistulous communication between the esophagogastric anastomosis and tracheobronchial tree, particularly if pulmonary infiltrate fails to improve/resolve with subsequent imaging.  Recommend continued attention on follow-up.  LOCATION:  [facility]   Dictated by (CST): Richie Jay MD on 11/27/2023 at 2:43 PM     Finalized by (CST): Richie Jay MD on 11/27/2023 at 2:56 PM       CT CHEST(CONTRAST ONLY) (CPT=71260)    Result Date: 11/26/2023  CONCLUSION:  1. Miliary pattern of nodules throughout the lungs most pronounced in the right lung appear worse compared to the prior exam. 2. Superimposed ground-glass opacities throughout the lungs most pronounced in the left lower lobe is concerning for infiltrate. 3. Superimposed more focal nodules in the right middle and lower lobes are nonspecific.  Metastatic disease cannot be excluded.  Follow-up after treatment is recommended to document resolution.   LOCATION:  [id number]   Dictated by ([facility]): Shar Rousseau MD on 11/26/2023 at 2:08 PM     Finalized by (CST): Shar Rousseau MD on 11/26/2023 at 2:15 PM       XR CHEST AP PORTABLE  (CPT=71045)    Result Date: 11/26/2023  CONCLUSION:  Patchy opacity at the right lung base could represent atelectasis or pneumonia with a small right pleural effusion.   LOCATION:  [facility]      Dictated by ([facility]): Ovidio Patel MD on 11/26/2023 at 11:34 AM     Finalized by (CST): Ovidio Patel MD on 11/26/2023 at 11:35 AM         Assessment/Plan:  #1.  Bronchoesophageal fistula   s/p previous esophageal stenting (now removed 12/12/2023) and endobronchial stenting at [facility] (since removed) and now s/p ASD occluder device placement on 1/17/2024  Now with return of previous symptoms of postprandial coughing  His CT from Jan 29, 2024 does not demonstrate any obvious fistula, although there was new infiltrates in the RLL likely related to aspiration episodes  Given his symptoms, I would recommend he undergo bronchoscopy to evaluate the airway and would like to coordinate with GI - I will message Dr. Ritchie to determine timing/procedure.    #2. Bilateral nodules/infiltrates   suspect related to aspiration episodes and/or COVID moreso than malignancy  Will need follow up CT imaging in the next few months      #3.Postprandial cough   Related to above    #4. Metastatic esophageal adenocarcinoma s/p gastroesphagectomy 9/2022  Following with Dr. Foster on chemotherapy    Chapo Madrid MD  2/14/2024

## 2024-02-14 NOTE — PROGRESS NOTES
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 Since he was last seen in the hospital in mid January post removal of endobronchial stent and placement of ASD occluder device in the bronchoesophageal fistula, he had coughing leading to his return to the hospital at the end of January but left AMA prior to being seen by the GI service. He notes has had continued episodes of coughing post eating/drinking which seem less than before the ASD occluder device was placed but still annoying him.  More recently has had mild pain in his lower right chest/ upper abdomen for the past week, seems to be worse with movement/bending or other events such as coughing.  Phlegm has been normal, rarely green.  Did have some blood several weeks ago. No fevers.      Past Medical History:   Past Medical History:   Diagnosis Date    Back problem     Belching 02/01/2022    Black stools 02/01/2022    Borderline diabetes     Dx in 8/2013 - HgA1C 6.2%    C. difficile diarrhea 10/23/2022    pt treated and without symptoms    Chest pain 02/01/2022    Coronary artery disease     On 8/16/13: CABG x 4 with LIMA to LAD and SVG to diagonal, OM and PDA    Decorative tattoo 01/01/2000    Depression     Difficult intubation     h/o esophagectomy for CA; developed esophagobronchial vistula    Disorder of liver     LIVER CA    Esophageal cancer (HCC)     completed chemo    Esophageal reflux     Essential hypertension     Exposure to medical diagnostic radiation     last tx 8/18/2022    Frequent urination 01/01/2013     Gastroparesis     Heartburn 02/01/2022    High blood pressure     High cholesterol 08/01/2013    Found when I had quadruple bypass    History of COVID-19 10/16/2022    asymptomatic - pt was dx during a hospitalization for another diagnosis. No continued symptoms    History of stomach ulcers     Hyperlipidemia     Hyperlipidemia LDL goal < 70     Indigestion 02/01/2022    Morbid obesity with BMI of 40.0-44.9, adult (HCC)     Muscle weakness     Nontoxic multinodular goiter     Dx in 8/2013: pt was told that imaging showed thyroid cysts per PCP    Pancreatic cancer (HCC)     last dose 12/4/2023 is scheduled for another round 12/27/23    Peripheral vascular disease (HCC)     pt denies    Personal history of antineoplastic chemotherapy     for esophageal cancer/completed    Personal history of antineoplastic chemotherapy 12/2023    pancreatic cancer    Problems with swallowing 02/01/2022    Pulmonary nodules     Dx in 8/2013: CT chest showed small bilateral fissural-based lung nodules less than 1 cm    S/P CABG x 4     On 8/16/13: CABG x 4 with LIMA to LAD and SVG to diagonal, OM and PDA    Shortness of breath     when coughing; no oxygen    Vomiting 02/01/2022        Past Surgical History:   Past Surgical History:   Procedure Laterality Date    APPENDECTOMY      APPENDECTOMY      ARTHROSCOPY OF JOINT UNLISTED      right shoulder    CABG      On 8/16/13: CABG x 4 with LIMA to LAD and SVG to diagonal, OM and PDA    CARDIAC CATH LAB      On 8/14/2013: cardiac cath showed 3-vessel disease    OTHER SURGICAL HISTORY      1.       Laparoscopic robotic-assisted esophagogastrectomy.       Family Medical History:   Family History   Problem Relation Age of Onset    Cancer Mother         breast and colon     Diabetes Neg         Social History:   Social History     Socioeconomic History    Marital status:      Spouse name: Not on file    Number of children: 3    Years of education: Not on file    Highest education level:  Not on file   Occupational History    Occupation: works as  - on workman's comp   Tobacco Use    Smoking status: Former     Packs/day: 1.00     Years: 27.00     Additional pack years: 0.00     Total pack years: 27.00     Types: Cigarettes     Quit date: 8/15/2013     Years since quitting: 10.5    Smokeless tobacco: Never    Tobacco comments:     Quit smoking 2013   Vaping Use    Vaping Use: Never used   Substance and Sexual Activity    Alcohol use: Not Currently    Drug use: Never    Sexual activity: Not Currently     Partners: Female   Other Topics Concern     Service Not Asked    Blood Transfusions Not Asked    Caffeine Concern Yes    Occupational Exposure Not Asked    Hobby Hazards Not Asked    Sleep Concern Not Asked    Stress Concern No    Weight Concern No    Special Diet No    Back Care Not Asked    Exercise No    Bike Helmet Not Asked    Seat Belt No    Self-Exams Not Asked   Social History Narrative    Lives with life partner    Has 3 daughters - 1 lives closeby, 1 in [place], 1 in [place]     Social Determinants of Health     Financial Resource Strain: Low Risk  (12/14/2023)    Financial Resource Strain     Difficulty of Paying Living Expenses: Not very hard     Med Affordability: No   Food Insecurity: No Food Insecurity (1/17/2024)    Food Insecurity     Food Insecurity: Never true   Transportation Needs: No Transportation Needs (1/17/2024)    Transportation Needs     Lack of Transportation: No   Physical Activity: Not on file   Stress: Not on file   Social Connections: Not on file   Housing Stability: Low Risk  (1/17/2024)    Housing Stability     Housing Instability: No     Housing Instability Emergency: No        Medications:   Current Outpatient Medications   Medication Sig Dispense Refill    gabapentin 300 MG Oral Cap Take 1 capsule (300 mg total) by mouth in the morning and 1 capsule (300 mg total) before bedtime. 180 capsule 0    morphINE ER 15 MG Oral Tab CR Take 1 tablet (15 mg total)  by mouth every 12 (twelve) hours. 60 tablet 0    HYDROcodone-acetaminophen 5-325 MG Oral Tab Take 1-2 tablets by mouth every 4 (four) hours as needed for Pain. 60 tablet 0    benzonatate 100 MG Oral Cap Take 1 capsule (100 mg total) by mouth 3 (three) times daily as needed for cough. 90 capsule 0    sertraline 100 MG Oral Tab Take 2 tablets (200 mg total) by mouth daily.      ondansetron (ZOFRAN) 8 MG tablet Take 1 tablet (8 mg total) by mouth every 8 (eight) hours as needed for Nausea. 30 tablet 3    losartan 50 MG Oral Tab Take 1 tablet (50 mg total) by mouth daily. 90 tablet 2    Omeprazole 40 MG Oral Capsule Delayed Release Take 1 capsule (40 mg total) by mouth 2 (two) times daily before meals. 90 capsule 3    Pancrelipase, Lip-Prot-Amyl, (CREON) 33097-22380 units Oral Cap DR Particles Take 2 capsules with meals and 1 capsule with snacks 240 capsule 0    metoprolol succinate ER 50 MG Oral Tablet 24 Hr TAKE 1 TABLET BY MOUTH EVERY DAY 90 tablet 1       Review of Systems: Review of Systems   Constitutional: Negative.    HENT: Negative.     Respiratory:  Positive for cough. Negative for shortness of breath, wheezing and stridor.    Cardiovascular:  Positive for chest pain.        Physical Exam:  /80 (BP Location: Left arm, Patient Position: Sitting, Cuff Size: adult)   Pulse 51   Resp 16   Ht 6' 1.82\" (1.875 m)   Wt 177 lb (80.3 kg)   SpO2 97%   BMI 22.84 kg/m²      Constitutional: alert, cooperative. No acute distress.  HEENT: Head NC/AT.    Cardio: Regular rate and rhythm. Normal S1 and S2. No murmurs.   Respiratory: Thorax symmetrical with no labored breathing. Diminished bs bilaterally. No wheeze. No distress  GI: NABS. Abd soft, non-tender.  Extremities: No clubbing or cyanosis. No BLE edema.    Neurologic: A&Ox3. No gross motor deficits.  Skin: Warm, dry  Psych: Calm, cooperative. Pleasant affect.    Results:  Personally reviewed    WBC: 5.7, done on 2/5/2024.  HGB: 11.4, done on 2/5/2024.  PLT:  240, done on 2/5/2024.     Glucose: 122, done on 2/5/2024.  Cr: 0.62, done on 2/5/2024.  GFR(AA): 120, done on 7/26/2022.  GFR (non-AA): 104, done on 7/26/2022.  CA: 8.8, done on 2/5/2024.  Na: 139, done on 2/5/2024.  K: 3.9, done on 2/5/2024.  Cl: 106, done on 2/5/2024.  CO2: 29, done on 2/5/2024.  Last ALB was 3.1% done on 2/5/2024.     CT CHEST(CONTRAST ONLY) (CPT=71260)    Result Date: 1/29/2024  CONCLUSION:  Relatively dilute contrast attenuation within the gastroesophageal pull-through, with no definite contrast entering the airways on this exam.  Mildly enlarged mediastinal lymph nodes.  Concern for pneumonia or aspiration in the right lower lobe.   LOCATION:  [id number]   Dictated by ([facility]): Ernesto Cabrera MD on 1/29/2024 at 12:03 PM     Finalized by ([facility]): Ernesto Cabrera MD on 1/29/2024 at 12:08 PM       XR CHEST PA + LAT CHEST (CPT=71046)    Result Date: 1/29/2024  CONCLUSION:  1. Small right-sided pleural effusion with right basilar atelectasis/infiltrates.  2. Preliminary report was provided by [facility] at time of examination.  Final report confirms findings on preliminary report without discrepancy.   LOCATION:  [facility]   Dictated by (CST): Jenny Navarrete MD on 1/29/2024 at 7:42 AM     Finalized by ([facility]ST): Jenny Navarrete MD on 1/29/2024 at 7:43 AM       XR CHEST AP/PA (1 VIEW) (CPT=71045)    Result Date: 1/26/2024  CONCLUSION:  1. Blunting the right costophrenic angle which may represent chronic pleural reaction versus tiny effusion.  2. Minimal right basilar atelectasis/infiltrates.    LOCATION:  [facility]      Dictated by (CST): Jenny Navarrete MD on 1/26/2024 at 10:46 AM     Finalized by (CST): Jenny Navarrete MD on 1/26/2024 at 10:51 AM       XR CHEST AP PORTABLE  (CPT=71045)    Result Date: 1/17/2024  CONCLUSION:  Postsurgical changes of median sternotomy.  Stable heart size and pulmonary vascularity.  Port-A-Cath tip SVC.  There is a small right pleural effusion unchanged.  Slight  improvement in interstitial prominence/infiltrates in the left upper lobe and lung bases when compared to prior exam.  No pneumothorax.   LOCATION:  [facility]      Dictated by (CST): Gayle Alegre MD on 1/17/2024 at 1:11 PM     Finalized by (CST): Gayle Alegre MD on 1/17/2024 at 1:13 PM       XR ENDO BILE DUCT (CPT=74328)    Result Date: 1/17/2024  CONCLUSION:  As above.   LOCATION:  [facility]   Dictated by (CST): Joseph Mcdaniels DO on 1/17/2024 at 12:41 PM     Finalized by (CST): Joseph Mcdaniels DO on 1/17/2024 at 12:43 PM       XR ESOPHAGUS SINGLE CONTRAST (CPT=74220)    Result Date: 1/9/2024  CONCLUSION:  Postsurgical changes of gastric pull-through with mild dysmotility.  No evidence of significant stricture or fistula.   LOCATION:  [facility]   Dictated by (CST): Ovidio Patel MD on 1/09/2024 at 12:28 PM     Finalized by ([facility]): Ovidio Patel MD on 1/09/2024 at 12:31 PM       XR VIDEO SWALLOW (CPT=74230)    Result Date: 1/9/2024  PROCEDURE:  XR VIDEO SWALLOW (CPT=74230)  TECHNIQUE:  Video fluoroscopic swallowing study was performed in cooperation with the speech pathologist.  Barium of varying consistencies was administered orally with patient in lateral projection.  COMPARISON:  [facility] , XR, XR VIDEO SWALLOW (CPT=74230), 1/02/2024, 8:58 AM.  INDICATIONS:  coughing/choking with PO liwuid intake  PATIENT STATED HISTORY: (As transcribed by Technologist)  The patient offered no additional history at this point.   CINE CAPTURES:  5 FLUORSCOPY TIME:  43 sec RADIATION DOSE (AIR KERMA PRODUCT):  22.6 uGy/m2   FINDINGS:  PHARYNGEAL PHASE:  Normal for age. ASPIRATION:  None. PENETRATION:  Normal. OTHER:  Negative.  PLEASE SEE SPEECH PATHOLOGIST REPORT FOR FULL ASSESSMENT OF SWALLOWING MECHANISM.   LOCATION:  [facility]    Dictated by (CST): Ovidio Patel MD on 1/09/2024 at 12:11 PM     Finalized by (CST): Ovidio Patel MD on 1/09/2024 at 12:11 PM       XR CHEST AP PORTABLE  (CPT=71045)    Result Date: 1/8/2024  CONCLUSION:  1. There is  some interval clearing of opacity from left upper lobe which is probably improved pneumonia. 2. Persistent right costophrenic angle blunting is consistent with effusion or atelectasis. 3. There is a stent in the right mainstem bronchus which is stable.   LOCATION:        Dictated by (CST): Kraig Tristan MD on 1/08/2024 at 7:31 PM     Finalized by (CST): Kraig Tristan MD on 1/08/2024 at 7:32 PM       PET STANDARD BODY SCAN (ONCOLOGY) (CPT=78815)    Result Date: 1/4/2024  CONCLUSION:  1. Diffuse ground-glass opacity in the left lung, which has intervally increased in comparison to 12/30/2023.  There is new FDG avid mediastinal lymphadenopathy, which may be reactive versus malignant.  Continued attention on follow-up is recommended.  Continued follow-up to resolution of the ground-glass opacities recommended. 2. New FDG avid hepatic and pancreatic lesions.  The uptake near the pancreatic uncinate process is similar to 4/10/2023, which had resolved on 7/27/2023 exam.  This is suspicious for recurrence of disease.   LOCATION:  [facility]    Dictated by (CST): Shar Simms MD on 1/04/2024 at 2:03 PM     Finalized by (CST): Shar Simms MD on 1/04/2024 at 2:59 PM       XR VIDEO SWALLOW (CPT=74230)    Result Date: 1/2/2024  PROCEDURE:  XR VIDEO SWALLOW (CPT=74230)  TECHNIQUE:  Video fluoroscopic swallowing study was performed in cooperation with the speech pathologist.  Barium of varying consistencies was administered orally with patient in lateral projection.  COMPARISON:  [facility] , XR, XR VIDEO SWALLOW (CPT=74230), 11/18/2022, 2:09 PM.  INDICATIONS:  Dysphagia  PATIENT STATED HISTORY: (As transcribed by Technologist)  patient states history of trouble swallowing and coughing while eating and drinking. EGD, esophageal stent removed, and UGI test done within the last few months without improvement to symptoms.   CINE CAPTURES:  7 FLUORSCOPY TIME:  1 minute 45 seconds RADIATION DOSE (AIR KERMA PRODUCT):  65.1 uGy/m2    FINDINGS:  No penetration or aspiration was seen with thin liquids, honey, nectar, purees, or solids.  PLEASE SEE SPEECH PATHOLOGIST REPORT FOR FULL ASSESSMENT OF SWALLOWING MECHANISM.   LOCATION:  [facility]    Dictated by (CST): Yahir To MD on 1/02/2024 at 9:18 AM     Finalized by (CST): Yahir To MD on 1/02/2024 at 9:19 AM       XR CHEST AP PORTABLE  (CPT=71045)    Result Date: 1/2/2024  CONCLUSION:  Bilateral interstitial infiltrates with interval worsening infiltrates in the left lung compared to the previous exam from 12/29/2023.  Differential considerations include atypical pneumonia and other inflammatory interstitial lung disease. 2. Cardiomegaly without edema.   LOCATION:  [facility]      Dictated by (CST): Cuong Wan MD on 1/02/2024 at 7:57 AM     Finalized by (CST): Cuong Wan MD on 1/02/2024 at 8:01 AM       CT CHEST+ABDOMEN+PELVIS(ALL CNTRST ONLY)(CPT=71260/86457)    Result Date: 12/30/2023  CONCLUSION:  1. Relative to the prior exam, there are increasing ground-glass opacities throughout the left upper lobe and to a lesser degree the left lower lobe, superimposed over diffuse reticular nodular opacities, suggestive of a progressive atypical pneumonia. 2. Suggestion of colitis involving the descending and rectosigmoid colon. 3. Please see the body of the report above for further, less significant details.  The preliminary report was reviewed.    LOCATION:  [facility]    Dictated by (CST): Joseph Mcdaniels DO on 12/30/2023 at 9:50 AM     Finalized by (CST): Joseph Mcdaniels DO on 12/30/2023 at 9:58 AM       XR CHEST AP PORTABLE  (CPT=71045)    Result Date: 12/29/2023  CONCLUSION:   Postoperative changes of CABG with stable cardiac and mediastinal contours.  Reticular opacities of the right lung base and patchy airspace disease of the left upper lobe compatible with infectious/inflammatory process, not substantially changed in the interim.  Small right pleural effusion is stable.  No pneumothorax.  Left  IJ chest port terminates at the cavoatrial junction.   LOCATION:  [facility]      Dictated by (CST): Richie Jay MD on 12/29/2023 at 4:40 PM     Finalized by (CST): Richie Jay MD on 12/29/2023 at 4:43 PM       XR CHEST PA + LAT CHEST (CPT=71046)    Result Date: 12/26/2023  CONCLUSION:  There is patchy consolidation in the left upper lobe that is concerning for pneumonia.  Clinical correlation recommended along with follow-up until complete resolution.  Small right pleural effusion.  Trace left pleural effusion.   LOCATION:  [facility]   Dictated by (CST): Immanuel Matthews MD on 12/26/2023 at 10:03 AM     Finalized by (CST): Immanuel Matthews MD on 12/26/2023 at 10:04 AM       XR UGI/ESOPHAGUS SINGLE CONTRAST (CPT=74240)    Result Date: 12/18/2023  CONCLUSION:   1.  No evidence of contrast extravasation to suggest anastomotic leak at this time.  2.  Moderate to severe gastroesophageal reflux.   LOCATION:  [facility]   Dictated by (CST): Ovidio Patel MD on 12/18/2023 at 10:55 AM     Finalized by (CST): Ovidio Patel MD on 12/18/2023 at 10:59 AM       CT CHEST PE AORTA (IV ONLY) (CPT=71260)    Result Date: 12/16/2023  CONCLUSION:   1. No evidence of pulmonary embolus.  2. Previously noted stent in gastric pull-through region is no longer identified.  3. Numerous scattered nodules throughout the lungs are again identified.  Minimal increased ground-glass densities in the left upper lobe are noted.  These are nonspecific.  Ground-glass nodular densities in left lower lobe are increased.  This measures approximately 2.2 x 2.0 cm.  2. Ground-glass consolidations in the lungs are mildly increased compared to prior examination.  This may be due to developing infectious process.  Numerous reticular nodular densities throughout the lungs are stable.    LOCATION:  [facility]   Dictated by (CST): Amilcar Jesus MD on 12/16/2023 at 10:01 PM     Finalized by (CST): Amilcar Jesus MD on 12/16/2023 at 10:21 PM       XR CHEST PA + LAT CHEST  (CPT=71046)    Result Date: 12/16/2023  CONCLUSION:  No new consolidation.  Blunting of the right costophrenic angle is stable.   LOCATION:  [facility]   Dictated by (CST): Amilcar Jesus MD on 12/16/2023 at 9:37 PM     Finalized by (CST): Amilcar Jesus MD on 12/16/2023 at 9:37 PM       XR GUIDE, GI DILATION (TDM=54592)    Result Date: 12/12/2023  CONCLUSION:   Solitary fluoroscopic image acquired during EGD procedure with visualization of endoscope and esophageal stent.  Please refer to dedicated procedure report for full detail.   LOCATION:  [facility]   Dictated by (CST): Richie Jay MD on 12/12/2023 at 5:58 PM     Finalized by (CST): Richie Jay MD on 12/12/2023 at 5:58 PM       CT ANGIOGRAPHY, CHEST (CPT=71275)    Result Date: 12/10/2023  CONCLUSION:   1. Negative for acute pulmonary embolism.  Widespread pulmonary nodules are redemonstrated.  No developing pleural effusion, or pneumothorax.  No acute lobar pneumonia seen.  2. Change in position of stent, now positioned in the thorax, previously partially at the hiatus.  3. Aberrant right subclavian artery.  At the current position of the stent, the aberrant subclavian artery crosses posterior to the esophagus about 1.6 cm above the upper margin of the stent.  If the stent migrates or is repositioned more superiorly, potentially the metal of the stent could erode into the aberrant right subclavian artery.     LOCATION:  [facility]   Dictated by (CST): Ernesto Cabrera MD on 12/10/2023 at 9:16 PM     Finalized by (CST): Ernesto Cabrera MD on 12/10/2023 at 9:26 PM       XR GUIDE, GI DILATION (ILS=45319)    Result Date: 11/28/2023  CONCLUSION:  Fluoroscopic guidance for stent removal.   LOCATION:  [facility]   Dictated by (CST): Ovidio Patel MD on 11/28/2023 at 3:41 PM     Finalized by (CST): Ovidio Patel MD on 11/28/2023 at 3:42 PM       CT CHEST (TDK=37389)    Result Date: 11/27/2023  CONCLUSION:   1. Esophagectomy with gastric pull-through.  Stable positioning of  stents of the right mainstem bronchus and gastric pull-through.  No direct imaging evidence of persistent communication between the esophagogastric anastomosis and tracheobronchial tree.  2. Extensive tree-in-bud type opacities present within both lungs, most prevalent within the right middle and right lower lobes, morphology most consistent with infectious bronchiolitis or aspiration.  The extent of disease is similar compared to 11/26/2023.  Persistence of pulmonary disease may represent indirect evidence of fistulous communication between the esophagogastric anastomosis and tracheobronchial tree, particularly if pulmonary infiltrate fails to improve/resolve with subsequent imaging.  Recommend continued attention on follow-up.  LOCATION:  [facility]   Dictated by (CST): Richie Jay MD on 11/27/2023 at 2:43 PM     Finalized by (CST): Richie Jay MD on 11/27/2023 at 2:56 PM       CT CHEST(CONTRAST ONLY) (CPT=71260)    Result Date: 11/26/2023  CONCLUSION:  1. Miliary pattern of nodules throughout the lungs most pronounced in the right lung appear worse compared to the prior exam. 2. Superimposed ground-glass opacities throughout the lungs most pronounced in the left lower lobe is concerning for infiltrate. 3. Superimposed more focal nodules in the right middle and lower lobes are nonspecific.  Metastatic disease cannot be excluded.  Follow-up after treatment is recommended to document resolution.   LOCATION:  [id number]   Dictated by ([facility]): Shar Rousseau MD on 11/26/2023 at 2:08 PM     Finalized by (CST): Shar Rousseau MD on 11/26/2023 at 2:15 PM       XR CHEST AP PORTABLE  (CPT=71045)    Result Date: 11/26/2023  CONCLUSION:  Patchy opacity at the right lung base could represent atelectasis or pneumonia with a small right pleural effusion.   LOCATION:  [facility]      Dictated by ([facility]): Ovidio Patel MD on 11/26/2023 at 11:34 AM     Finalized by (CST): Ovidio Patel MD on 11/26/2023 at 11:35 AM         Assessment/Plan:  #1.  Bronchoesophageal fistula   s/p previous esophageal stenting (now removed 12/12/2023) and endobronchial stenting at [facility] (since removed) and now s/p ASD occluder device placement on 1/17/2024  Now with return of previous symptoms of postprandial coughing  His CT from Jan 29, 2024 does not demonstrate any obvious fistula, although there was new infiltrates in the RLL likely related to aspiration episodes  Given his symptoms, I would recommend he undergo bronchoscopy to evaluate the airway and would like to coordinate with GI - I will message Dr. Ritchie to determine timing/procedure.    #2. Bilateral nodules/infiltrates   suspect related to aspiration episodes and/or COVID moreso than malignancy  Will need follow up CT imaging in the next few months      #3.Postprandial cough   Related to above    #4. Metastatic esophageal adenocarcinoma s/p gastroesphagectomy 9/2022  Following with Dr. Foster on chemotherapy    Chapo Madrid MD  2/14/2024

## 2024-02-16 ENCOUNTER — PATIENT MESSAGE (OUTPATIENT)
Facility: CLINIC | Age: 54
End: 2024-02-16

## 2024-02-19 ENCOUNTER — OFFICE VISIT (OUTPATIENT)
Dept: HEMATOLOGY/ONCOLOGY | Age: 54
End: 2024-02-19
Attending: INTERNAL MEDICINE
Payer: COMMERCIAL

## 2024-02-19 ENCOUNTER — PATIENT MESSAGE (OUTPATIENT)
Facility: CLINIC | Age: 54
End: 2024-02-19

## 2024-02-19 ENCOUNTER — TELEPHONE (OUTPATIENT)
Facility: CLINIC | Age: 54
End: 2024-02-19

## 2024-02-19 VITALS
BODY MASS INDEX: 22.78 KG/M2 | TEMPERATURE: 97 F | WEIGHT: 177.5 LBS | DIASTOLIC BLOOD PRESSURE: 78 MMHG | OXYGEN SATURATION: 96 % | HEIGHT: 73.82 IN | SYSTOLIC BLOOD PRESSURE: 127 MMHG | HEART RATE: 50 BPM | RESPIRATION RATE: 16 BRPM

## 2024-02-19 DIAGNOSIS — J86.0: ICD-10-CM

## 2024-02-19 DIAGNOSIS — C15.9 ESOPHAGEAL CARCINOMA (HCC): Primary | ICD-10-CM

## 2024-02-19 DIAGNOSIS — R05.3 CHRONIC COUGH: ICD-10-CM

## 2024-02-19 DIAGNOSIS — G89.3 NEOPLASM RELATED PAIN: ICD-10-CM

## 2024-02-19 DIAGNOSIS — Z51.11 ENCOUNTER FOR CHEMOTHERAPY MANAGEMENT: ICD-10-CM

## 2024-02-19 PROCEDURE — 96523 IRRIG DRUG DELIVERY DEVICE: CPT

## 2024-02-19 PROCEDURE — 99215 OFFICE O/P EST HI 40 MIN: CPT | Performed by: NURSE PRACTITIONER

## 2024-02-19 RX ORDER — ONDANSETRON HYDROCHLORIDE 8 MG/1
8 TABLET, FILM COATED ORAL EVERY 8 HOURS PRN
Qty: 30 TABLET | Refills: 3 | Status: SHIPPED | OUTPATIENT
Start: 2024-02-19

## 2024-02-19 RX ORDER — PROCHLORPERAZINE MALEATE 10 MG
10 TABLET ORAL EVERY 6 HOURS PRN
Qty: 30 TABLET | Refills: 3 | Status: SHIPPED | OUTPATIENT
Start: 2024-02-19

## 2024-02-19 RX ORDER — GABAPENTIN 300 MG/1
300 CAPSULE ORAL 2 TIMES DAILY
Qty: 180 CAPSULE | Refills: 0 | Status: SHIPPED | OUTPATIENT
Start: 2024-02-19

## 2024-02-19 RX ORDER — PANCRELIPASE 24000; 76000; 120000 [USP'U]/1; [USP'U]/1; [USP'U]/1
CAPSULE, DELAYED RELEASE PELLETS ORAL
Qty: 240 CAPSULE | Refills: 0 | Status: SHIPPED | OUTPATIENT
Start: 2024-02-19

## 2024-02-19 RX ORDER — CODEINE PHOSPHATE AND GUAIFENESIN 10; 100 MG/5ML; MG/5ML
5 SOLUTION ORAL EVERY 6 HOURS PRN
Qty: 237 ML | Refills: 1 | Status: SHIPPED | OUTPATIENT
Start: 2024-02-19

## 2024-02-19 NOTE — PROGRESS NOTES
Pt presented for APN visit and chemotherapy. Treatment held due to procedure planned for 2/20/2024. Pt scheduled to RTC 2/22/2024. Pt port de-accessed and pt discharged home in stable condition.

## 2024-02-19 NOTE — PROGRESS NOTES
[facility] ANP Visit Note    Patient Name: Dontae Cortez Jr.   YOB: 1970   Medical Record Number: [id number]   Date of visit: 2/19/2024     Chief Complaint/Reason for Visit:  Chief Complaint   Patient presents with    Follow - Up    Chemotherapy        Oncologic history:     2018: Per report had a normal EGD and colonoscopy     June 2022: He met with GI due to new onset dysphagia which started about 1 month prior to his visit.  He had an esophagram with a focal abrupt narrowing with irregular margins in the distal one third of the esophagus extending to the GE junction.  He also had an episode of food impaction. He has also lost about 15 lb. He was a heavy smoker from age 14 to about 40 (1.5 PPD). Also with alcohol use currently. Mother with a history of breast and colon cancer.      EGD and EUS with Dr. Yadav on 6/7/2022: Severe esophagitis with a concerning mass extending 37-39 centimeters from the incisors.  Nonobstructive mass.  By EUS uT3N1 disease.  Pathology showed invasive moderate to poorly differentiated adenocarcinoma.  HER2 amplified by FISH     CT CAP done at [facility] on 6/16/22: Results not loaded to PACs yet.  CA 19-9 from the same day was 107.4.  CEA normal     PET/CT on 6/20/2022: Increased distal esophageal uptake with an SUV of 14.4.  Concern for shreya metastases.     Concurrent chemoradiation with carboplatin AUC2 plus paclitaxel 50 mg/m2  C1D1: 7/6/22: weight 280 :  137  C1D8: 7/13/22: weight 277  C1D15: 7/20/22: weight 278.   C1D22: 7/27/22  103. Weight 278   C1D29: 8/3/22:  49. Weight 275  C1D36: 8/10/22:  30.9. Weight 276     PET/CT on 9/6/22: CONCLUSION:  Improvement.  Abnormal elevated activity in the distal esophagus, but uptake has decreased compared to the prior.  Furthermore, there is no longer uptake identified within 2 upper abdominal lymph nodes, 1 has disappeared from view, another has decreased in size.      Surgery with  Dr. Lu on 9/30/22: ypT1b pN0. Recovery has been complicated by an esophageal leak,      I met with him on 12/12/22. We discussed adjuvant opdivo for 1 year. His  was elevated to 48 and repeat was 64. CT CAP was recommended.      He was admitted on 12/25/22 for abdominal pain. He had a POEM procedure done. He was also noted to have a RLL consolidation. He was seen by pulmonary, based on imaging an outpatient PET/CT was recommended and a biopsy of the most FDG avid lesion would be considered. Noted to have CAD and an outpatient evaluation was recommended.   CTA Chest/A/P from 12/24/22: 1. No evidence of pulmonary embolism. 2. There has been interval esophagectomy with gastric pull-through.  No postoperative complications along the operative site noted. 3. The prior described focus of atelectasis along the right lung base is noted seen along the medial segment of the right middle lobe and has a consolidative type appearance, similar in configuration to the previous study and measuring 6.2 x 4.6 x 3.6 cm.  The appearance is concerning for potential metastasis.  Further assessment with PET/CT is recommended. 4. Diverticulosis of the sigmoid colon is again noted.  There has been interval placement of a short segment stent within the mid sigmoid colon as described above.  Please correlate with patient's history.   PET/CT on 1/4/23: 1. Abnormal uptake corresponding to consolidation at the right lung base.  This is likely inflammatory/infectious in nature.  The patient had a prior right hydropneumothorax requiring small bore chest tube placement.   2. Abnormal uptake corresponding to a small soft tissue focus in anterior abdominal wall.  This may be related to prior surgery.  Attention on follow-up imaging recommended. 3. No convincing metastatic findings in the chest, abdomen or pelvis. 4. Details as above  His case was reviewed in GI TB. No focal areas of concern. Given stable imaging, recommend proceed with  immunotherapy and repeat imaging after 4-6 cycles.      Adjuvant Opdivo: 1/19/2023  C1: 1/9/23:  198  C2: 1/23/23:  212  C3: 2/6/23:  284  C4: 2/20/23:  357     Tumor Board Review of Most recent pathology. Appears to be granulomatous disease. Malignancy very low on the differential. He should follow up with pulmonary medicine and infectious disease. We will continue routine surveillance imaging for his esophageal cancer. We will continue with immunotherapy as planned.      CT CAP on 2/28/23: 1. Stable postsurgical changes status post esophagectomy and gastric pull-through. 2. Nodules along the left major fissure appear mildly more prominent compared to the prior examination.  Continued attention on follow-up is recommended.  Additional right middle lobe nodule appears stable. 3. Bandlike opacity in the region of the right middle lobe appears improved compared to the prior exam and may represent postsurgical change versus atelectasis although neoplasm cannot be completely excluded.  Continued attention on follow-up is recommended.  A repeat PET-CT may be obtained to document stability. 4. No evidence of metastatic disease in the abdomen and pelvis.     C5: 3/6/23     PET/CT on 4/10/23: 1. Foci of hypermetabolism of the pancreatic head and tail segments with SUV max of 7.2 and 5.1, respectively, highly suspicious for metastases. 2. Subtle hypermetabolic focus of the posterior segment right hepatic lobe (SUV max 3.8), equivocal for variable physiologic uptake versus metastasis.  Liver MRI may be helpful for further characterization.   3. Suspected benign inflammatory uptake within the thorax, as above.      EGD/EUS on 4/27/23 with Dr. Ritchie: Pancreatic head mass positive for adenocarcinoma--Comparison to previous esophageal cancer shows similarity but IHC in non-specific. Her 2 IHC was 2+ on this specimen but FISH was negative.      Chemo + Herceptin + Immunotherapy  C1: 5/1/23:  FOLFOX-Herceptin-Opdivo  C2: 5/15/23:  1454: FOLFOX-Herceptin-Opdivo  C3: 6/5/23: Switched to Xeloda, Oxaliplatin, Herceptin, Keytruda:  329  C4: 7/10/23: Delayed to the thrombocytopenia:  79 to 35 Will switch back to 5-FU  C5: 7/24/23:  21.5     PET/CT on 7/27/23: 1. Previously described hypermetabolic lesions in the pancreas and in the liver are no longer identified on this study and could represent resolved or treated metastases. 2. Mild activity near the proximal anastomosis at the gastric pull-through is not significantly changed. 3. There are no new suspicious findings      C6: 8/7/23:  15.1  C7: 8/28/23: Ca19-9 WE are holding Oxaliplatin d/t neuropathy.  13.9  --5FU + Herceptin + Immunotherapy alone--  C8: 9/11/23:  12.3 Signatera negative     Herceptin and Keytruda maintenance q3 weeks since Signatera was negative   C1: 9/25/23:  10.3  C2: 10/9/23: Ca19-9 15. 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 Diffuse ground-glass opacity in the left lung, which has intervally increased in comparison to 12/30/2023.  There is new FDG avid mediastinal lymphadenopathy, which may be reactive versus malignant.  Continued attention on follow-up is recommended.  Continued follow-up to resolution of the ground-glass opacities recommended. 2. New FDG avid hepatic and pancreatic lesions.  The uptake near the pancreatic uncinate process is similar to 4/10/2023, which had resolved on 7/27/2023 exam.  This is suspicious for recurrence of disease. Given these findings, we discussed restarting FOLFOX.      -admitted from 1/8/24-1/13/24 for persistent dysphagia and  bronchesophageal stent. Plan is for an ASD occluder and removal of the bronchial stent as an outpatient.      -1/17/24: EGD with fistula closure with ASD occluder and removal of the bronchial stent.     -FOLFOX + Herceptin + Opdivo restarted  C1: 2/5/24    History of Present Illness:     Dontae Cortez Jr. is a 53 year old patient of Dr. Foster with the above oncologic history who is being treated with FOLFOX + trastuzumab + nivolumab for metastatic esophageal cancer. Presents today for APN evaluation prior to treatment. He is accompanied by his wife.     He has been having persistent aspiration type symptoms (cough after eating/drinking) with thick yellow sputum. Denies fevers or rigors. He is scheduled for EGD/Bronch tomorrow with Marshall Madrid + Erma for evaluation of ASD occluder. He is also scheduled for echo tomorrow as well.     Met with Palliative care last week and pain meds were adjusted. He reports that his pain has been more manageable since starting new regimen. His wife notes some mild increase in fatigue and decreased mental sharpness, but no significant sedation or confusion.     He reports he's not sure if he wants to continue with chemotherapy. Has been feeling poorly for so long and is not sure he wants to continue.     Reviewed available chart notes, labs, and imaging.    History:     Past medical, surgical, social and family history reviewed with the patient and updated as appropriate in the chart.    Allergies:  No Known Allergies    Medications:    Current Outpatient Medications:     ondansetron (ZOFRAN) 8 MG tablet, Take 1 tablet (8 mg total) by mouth every 8 (eight) hours as needed for Nausea., Disp: 30 tablet, Rfl: 3    Pancrelipase, Lip-Prot-Amyl, (CREON) 93760-48505 units Oral Cap DR Particles, Take 2 capsules with meals and 1 capsule with snacks, Disp: 240 capsule, Rfl: 0    gabapentin 300 MG Oral Cap, Take 1 capsule (300 mg total) by mouth in the morning and 1 capsule (300 mg  total) before bedtime., Disp: 180 capsule, Rfl: 0    guaiFENesin-codeine 100-10 MG/5ML Oral Solution, Take 5 mL by mouth every 6 (six) hours as needed for cough., Disp: 237 mL, Rfl: 1    morphINE ER 15 MG Oral Tab CR, Take 1 tablet (15 mg total) by mouth every 12 (twelve) hours., Disp: 60 tablet, Rfl: 0    HYDROcodone-acetaminophen 5-325 MG Oral Tab, Take 1-2 tablets by mouth every 4 (four) hours as needed for Pain., Disp: 60 tablet, Rfl: 0    benzonatate 100 MG Oral Cap, Take 1 capsule (100 mg total) by mouth 3 (three) times daily as needed for cough., Disp: 90 capsule, Rfl: 0    sertraline 100 MG Oral Tab, Take 2 tablets (200 mg total) by mouth daily., Disp: , Rfl:     sucralfate (CARAFATE) 1 g Oral Tab, Take 1 tablet (1 g total) by mouth 3 (three) times daily as needed., Disp: 60 tablet, Rfl: 3    losartan 50 MG Oral Tab, Take 1 tablet (50 mg total) by mouth daily., Disp: 90 tablet, Rfl: 2    Omeprazole 40 MG Oral Capsule Delayed Release, Take 1 capsule (40 mg total) by mouth 2 (two) times daily before meals., Disp: 90 capsule, Rfl: 3    metoprolol succinate ER 50 MG Oral Tablet 24 Hr, TAKE 1 TABLET BY MOUTH EVERY DAY, Disp: 90 tablet, Rfl: 1    prochlorperazine (COMPAZINE) 10 mg tablet, Take 1 tablet (10 mg total) by mouth every 6 (six) hours as needed for Nausea. (Patient not taking: Reported on 2/19/2024), Disp: 30 tablet, Rfl: 3    Review of Systems:  A comprehensive 14 point review of systems was completed.  Pertinent positives and negatives noted in the HPI.    Performance Status: ECOG 1-2    Vital Signs:   2/19/2024  10:30 AM   Oncology Vitals    Height 6' 1.819\"    Height 188 cm    Weight 177 lb 8 oz    Weight 80.513 kg    BSA (m2) 2.06 m2    BMI 22.9 kg/m2    /78    Pulse 50    Resp 16    Temp 96.5 °F (35.8 °C)    SpO2 96 %    Pain Score      Physical Examination:  General: non-toxic appearing, alert and oriented x 3, not in acute distress.  HEENT: Anicteric, conjunctivae and sclerae clear, no  oropharyngeal lesion/thrush, mucous membranes are moist.  Chest: diminished in RLL, otherwise clear to auscultation, respirations unlabored.  Heart: Regular rate and rhythm, normal S1/2. No murmur.   Extremities: No edema.  Neurological: Grossly intact.   Skin: Warm, Dry, No erythema, No rash  Psych/Depression: mood and affect are appropriate.     Impression/Plan    Metastatic esophageal cancer: noted to have progression of disease while on maintenance IO + trastuzumab. Restarted FOLFOX + trastuzumab + nivolumab on 2/5, tolerated without severe or unexpected side effects. He is due for treatment today but will hold treatment in deference to EGD/Bronch planned for tomorrow. Barring further complication, anticipated proceeding with cycle 2 later this week. Plan for repeat PET after 4 cycles of chemo+IO.     Bronch-esophageal fistula: s/p ASD occluder placement, he is scheduled for EGD/Bronch for further evaluation tomorrow. Hopefully this will provide sustained relief of his cough/aspiration. If no improvement in symptoms, will continue to address GOC and desire for further anti-cancer therapy.     Neoplasm related pain: improving on new pain regimen. Continue to follow with palliative care.     Monitoring for cardiotoxic medication: repeat echo scheduled for tomorrow, will reschedule as bronch/EGD takes priority.     Emotional Well Being:  I have assessed the patient's emotional well-being and any concerns about anxiety or depression.  We discussed issues of distress, coping difficulties and social support systems and currently there are no active problems.    Planned Follow Up: Thursday 2/22 for APN + labs + possible chemo    Risk Level: HIGH - esophageal cancer receiving chemotherapy + IO with systemic effects requiring intervention and close monitoring     The 21st Century Cures Act makes medical notes like these available to patients in the interest of transparency. Please be advised this is a medical document.  Medical documents are intended to carry relevant information, facts as evident, and the clinical opinion of the practitioner. The medical note is intended as peer to peer communication and may appear blunt or direct. It is written in medical language and may contain abbreviations or verbiage that are unfamiliar.     Electronically Signed by:    Grceia Angelo DNP, NP-C  Nurse Practitioner  [facility]

## 2024-02-19 NOTE — PROGRESS NOTES
Patient here for follow-up and planned C16D1 treatment. Will have a EGD with bronch done tomorrow. States pain is managed at this time. Energy levels are still poor. Still having yellow sputum with his cough.

## 2024-02-19 NOTE — TELEPHONE ENCOUNTER
From: Dontae Cortez Jr.  To: Chapo Madrid  Sent: 2/19/2024 8:27 AM CST  Subject: Dontae Cortez     Let me know

## 2024-02-19 NOTE — TELEPHONE ENCOUNTER
Call placed to [facility] to check if PA needed for bronchoscopy which is scheduled for tomorrow a.m.  Per rep, plan does not require PA for CPT code 94860 or any out patient surgical procedures.  Call reference number [id number]. Unable to document in referral as there is no referral listed for bronchoscopy to be performed by Dr. Madrid.  Message forwarded to Dr. Madrid.

## 2024-02-20 ENCOUNTER — APPOINTMENT (OUTPATIENT)
Dept: GENERAL RADIOLOGY | Facility: HOSPITAL | Age: 54
End: 2024-02-20
Attending: INTERNAL MEDICINE
Payer: COMMERCIAL

## 2024-02-20 ENCOUNTER — ANESTHESIA EVENT (OUTPATIENT)
Dept: ENDOSCOPY | Facility: HOSPITAL | Age: 54
End: 2024-02-20
Payer: COMMERCIAL

## 2024-02-20 ENCOUNTER — ANESTHESIA (OUTPATIENT)
Dept: ENDOSCOPY | Facility: HOSPITAL | Age: 54
End: 2024-02-20
Payer: COMMERCIAL

## 2024-02-20 ENCOUNTER — HOSPITAL ENCOUNTER (OUTPATIENT)
Facility: HOSPITAL | Age: 54
Setting detail: HOSPITAL OUTPATIENT SURGERY
Discharge: HOME | End: 2024-02-20
Attending: INTERNAL MEDICINE | Admitting: INTERNAL MEDICINE
Payer: COMMERCIAL

## 2024-02-20 VITALS
TEMPERATURE: 97 F | BODY MASS INDEX: 23.46 KG/M2 | RESPIRATION RATE: 16 BRPM | SYSTOLIC BLOOD PRESSURE: 161 MMHG | OXYGEN SATURATION: 96 % | DIASTOLIC BLOOD PRESSURE: 83 MMHG | HEIGHT: 73 IN | WEIGHT: 177 LBS | HEART RATE: 45 BPM

## 2024-02-20 DIAGNOSIS — R13.19 ESOPHAGEAL DYSPHAGIA: ICD-10-CM

## 2024-02-20 DIAGNOSIS — J86.0: ICD-10-CM

## 2024-02-20 DIAGNOSIS — C15.9 MALIGNANT NEOPLASM OF ESOPHAGUS, UNSPECIFIED LOCATION (HCC): ICD-10-CM

## 2024-02-20 DIAGNOSIS — R05.9 COUGH, UNSPECIFIED TYPE: ICD-10-CM

## 2024-02-20 PROBLEM — R91.8 ENDOBRONCHIAL MASS: Status: ACTIVE | Noted: 2024-01-01

## 2024-02-20 PROBLEM — R91.8 ENDOBRONCHIAL MASS: Status: ACTIVE | Noted: 2024-02-20

## 2024-02-20 PROCEDURE — 0BB38ZX EXCISION OF RIGHT MAIN BRONCHUS, VIA NATURAL OR ARTIFICIAL OPENING ENDOSCOPIC, DIAGNOSTIC: ICD-10-PCS | Performed by: INTERNAL MEDICINE

## 2024-02-20 PROCEDURE — 3E0G8GC INTRODUCTION OF OTHER THERAPEUTIC SUBSTANCE INTO UPPER GI, VIA NATURAL OR ARTIFICIAL OPENING ENDOSCOPIC: ICD-10-PCS | Performed by: INTERNAL MEDICINE

## 2024-02-20 PROCEDURE — 71045 X-RAY EXAM CHEST 1 VIEW: CPT | Performed by: INTERNAL MEDICINE

## 2024-02-20 PROCEDURE — 74360 X-RAY GUIDE GI DILATION: CPT | Performed by: INTERNAL MEDICINE

## 2024-02-20 PROCEDURE — 0DJ08ZZ INSPECTION OF UPPER INTESTINAL TRACT, VIA NATURAL OR ARTIFICIAL OPENING ENDOSCOPIC: ICD-10-PCS | Performed by: INTERNAL MEDICINE

## 2024-02-20 PROCEDURE — 31625 BRONCHOSCOPY W/BIOPSY(S): CPT | Performed by: INTERNAL MEDICINE

## 2024-02-20 RX ORDER — LIDOCAINE HYDROCHLORIDE 10 MG/ML
INJECTION, SOLUTION EPIDURAL; INFILTRATION; INTRACAUDAL; PERINEURAL AS NEEDED
Status: DISCONTINUED | OUTPATIENT
Start: 2024-02-20 | End: 2024-02-20 | Stop reason: SURG

## 2024-02-20 RX ORDER — NALOXONE HYDROCHLORIDE 0.4 MG/ML
0.08 INJECTION, SOLUTION INTRAMUSCULAR; INTRAVENOUS; SUBCUTANEOUS ONCE AS NEEDED
Status: DISCONTINUED | OUTPATIENT
Start: 2024-02-20 | End: 2024-02-20

## 2024-02-20 RX ORDER — SODIUM CHLORIDE, SODIUM LACTATE, POTASSIUM CHLORIDE, CALCIUM CHLORIDE 600; 310; 30; 20 MG/100ML; MG/100ML; MG/100ML; MG/100ML
INJECTION, SOLUTION INTRAVENOUS CONTINUOUS
Status: DISCONTINUED | OUTPATIENT
Start: 2024-02-20 | End: 2024-02-20

## 2024-02-20 RX ADMIN — LIDOCAINE HYDROCHLORIDE 100 MG: 10 INJECTION, SOLUTION EPIDURAL; INFILTRATION; INTRACAUDAL; PERINEURAL at 07:23:00

## 2024-02-20 NOTE — OPERATIVE REPORT
[facility]    Dontae Cortez . Patient Status:  Outpatient in a Bed    10/15/1970 MRN [id number]   Location [facility] POST ANESTHESIA CARE UNIT Attending Raheel Ritchie MD   Hosp Day # 0 PCP Adrian Horowitz MD     OPERATIVE REPORT:     DATE OF OPERATION: 24     PREOPERATIVE DIAGNOSIS(ES): bronchoesophageal fistula     POSTOPERATIVE DIAGNOSIS(ES): right main stem bronchoesophageal fistula, mucosa ulcer just distal to the ASD occluder device, right main stem endobronchial lesion     OPERATION(S) PERFORMED: Bronchoscopy with endobronchial biopsies of the right main stem endobronchial lesion     SURGEON: Chapo Madrid MD    ANESTHESIA: MAC. Please see separate flow sheet.      EQUIPMENT:   Olympus adult videobronchoscope.  Standard biopsy forceps    INDICATION: The patient is a 53 year old, who was found to have bronchoesophageal fistula with previous stenting and most recently underwent ASD occluder device placement  who had some initial improvement in symptoms, however he has noticed return of his postprandial coughing. The procedure is being performed in conjunction with Dr. Ritchie performing an EGD to evaluate the esophagogastric portion of the fistula and positioning of the ASD occluder device. Differential diagnosis, risks, benefits and alternatives were discussed at length.     CONSENT:  Risks and benefits were reviewed with the patient in detail regarding the procedure as well as anesthesia prior to the procedure. All questions were answered, and the patient was agreeable.     PROCEDURE:  After informed consent was obtained, the patient was brought to the bronchoscopy suite.  Time out performed.  Patient was placed in a supine position, anesthesia administered, and topical lidocaine given for local anesthesia.     First, the videobronchoscope was used and inserted orally through bite block. The upper airways appeared normal. The vocal cords were insufficiently  evaluated due to the level of sedation but appeared normal. The bronchoscope was then advanced into the trachea. Evaluation of the trachea and left sided airways demonstrated moderate amount of thick white mucus but otherwise normal endobronchial examination to the subsegmental level with no evidence of endobronchial disease.   Upon evaluation of the right lung, there was a protuberant lesion on the anterolateral proximal right main stem bronchus. The was moderate amount of mucus overlying the ASD occluder device in the right main stem bronchus and distally in the bronchus intermedius. The mucus was cleared easily and the ASD occluder device was seen in appropriate position in the right main stem. Just distal to the ASD occluder device, there was friable mucosa with small ulcer in the proximal bronchus intermedius. The right upper lobe, remainder of bronchus intermedius, right middle and lowe lobe bronchi and respective segmental airways were patent and normal.   See Dr. Ritchie's note for details on EGD but during the course of the procedure, methylene blue was instilled on the gastric side which did demonstrate some penetration into the airway, albeit small amount.   After completion of the endoscopy, endobronchial biopsies were performed on the right main stem lesion with removal of the lesion and sample placed into formalin for histopathological processing.  There was no evidence of bleeding and the bronchoscope was then withdrawn. The patient tolerated the procedure well without immediate complications.    EBL:  <5ml     IMPRESSION:   Bronchoesophageal fistula  Proximal right main stem lesion     PLAN:   Monitor for persistent symptoms  Will need to follow up as outpatient    Chapo Madrid MD

## 2024-02-20 NOTE — H&P
[facility]  Pre-op H and P    Dontae Petresonriniesha Diego Champion Patient Status:  Hospital Outpatient Surgery    10/15/1970 MRN [id number]   Location [facility] END[facility] Attending Raheel Ricthie MD   Date 2024 PCP Adrian Horowitz MD     CC:   Esophagobronchial fistula    History of Present Illness:  Dontae Cortez JrFritz is a a(n) 53 year old male.   Esophagobronchial fistula    History:  Past Medical History:   Diagnosis Date    Back problem     Belching 2022    Black stools 2022    Borderline diabetes     Dx in 2013 - HgA1C 6.2%    C. difficile diarrhea 10/23/2022    pt treated and without symptoms    Chest pain 2022    Coronary artery disease     On 13: CABG x 4 with LIMA to LAD and SVG to diagonal, OM and PDA    Decorative tattoo 2000    Depression     Difficult intubation     h/o esophagectomy for CA; developed esophagobronchial vistula    Disorder of liver     LIVER CA    Esophageal cancer (HCC)     completed chemo    Esophageal reflux     Essential hypertension     Exposure to medical diagnostic radiation     last tx 2022    Frequent urination 2013    Gastroparesis     Heartburn 2022    High blood pressure     High cholesterol 2013    Found when I had quadruple bypass    History of COVID-19 10/16/2022    asymptomatic - pt was dx during a hospitalization for another diagnosis. No continued symptoms    History of stomach ulcers     Hyperlipidemia     Hyperlipidemia LDL goal < 70     Indigestion 2022    Morbid obesity with BMI of 40.0-44.9, adult (HCC)     Muscle weakness     Nontoxic multinodular goiter     Dx in 2013: pt was told that imaging showed thyroid cysts per PCP    Pancreatic cancer (HCC)     last dose 2023 is scheduled for another round 23    Peripheral vascular disease (HCC)     pt denies    Personal history of antineoplastic chemotherapy     for esophageal cancer/completed    Personal history of  antineoplastic chemotherapy 12/2023    pancreatic cancer    Problems with swallowing 02/01/2022    Pulmonary nodules     Dx in 8/2013: CT chest showed small bilateral fissural-based lung nodules less than 1 cm    S/P CABG x 4     On 8/16/13: CABG x 4 with LIMA to LAD and SVG to diagonal, OM and PDA    Shortness of breath     when coughing; no oxygen    Vomiting 02/01/2022     Past Surgical History:   Procedure Laterality Date    APPENDECTOMY      APPENDECTOMY      ARTHROSCOPY OF JOINT UNLISTED      right shoulder    CABG      On 8/16/13: CABG x 4 with LIMA to LAD and SVG to diagonal, OM and PDA    CARDIAC CATH LAB      On 8/14/2013: cardiac cath showed 3-vessel disease    OTHER SURGICAL HISTORY      1.       Laparoscopic robotic-assisted esophagogastrectomy.    PORT, INDWELLING, IMP       Family History   Problem Relation Age of Onset    Cancer Mother         breast and colon     Diabetes Neg       reports that he quit smoking about 10 years ago. His smoking use included cigarettes. He has a 27 pack-year smoking history. He has never used smokeless tobacco. He reports that he does not currently use alcohol. He reports that he does not use drugs.    Allergies:  No Known Allergies    Medications:    Current Facility-Administered Medications:     lactated ringers infusion, , Intravenous, Continuous    lactated ringers infusion, , Intravenous, Continuous    naloxone (Narcan) 0.4 MG/ML injection 0.08 mg, 0.08 mg, Intravenous, Once PRN    heparin (Porcine) 100 Units/mL lock flush 500 Units, 5 mL, Intracatheter, Once    Facility-Administered Medications Ordered in Other Encounters:     lidocaine PF (Xylocaine-MPF) 1% injection, , Intravenous, PRN    propofol (Diprivan) 10 MG/ML injection, , Intravenous, PRN    propofol (Diprivan) 10 mg/mL infusion premix, , Intravenous, Continuous PRN    Physical Exam:    Blood pressure 137/70, pulse 51, temperature 97.3 °F (36.3 °C), temperature source Temporal, resp. rate 16, height 6'  1\" (1.854 m), weight 177 lb (80.3 kg), SpO2 97%.    General: Appears alert, oriented x3 and in no acute distress.  CV: Normal rate   Lungs: Normal effort   Skin: Warm and dry.  Laboratory Data:       Imaging:      Assessment/Plan/Procedure:  Patient Active Problem List   Diagnosis    Primary hypertension    Hyperlipidemia with target low density lipoprotein (LDL) cholesterol less than 70 mg/dL    Coronary artery disease involving native coronary artery of native heart without angina pectoris    S/P CABG x 4    Nontoxic multinodular goiter    Pulmonary nodules    Thrombophlebitis leg    BRANDAN (generalized anxiety disorder)    Right shoulder Arthroscopy acromioplasty ,distal  clavicle resection, rotator cuff/labral debridment  Global 05/13/2021    Right bicipital tenosynovitis    Malignant neoplasm of esophagus (HCC)    Esophageal anastomotic leak    Esophageal obstruction    On total parenteral nutrition (TPN)    Mechanical complication of esophagostomy (HCC)    Abdominal pain of unknown etiology    Migration of esophageal stent    Gastroparesis    Esophageal carcinoma (HCC)    Normocytic anemia    Esophageal fistula    Subacute cough    Acquired bronchoesophageal fistula (HCC)    Pneumonia of both lower lobes due to infectious organism    Malignant neoplasm of pancreas (HCC)    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 difficile colitis    Dysphagia, unspecified type    Shortness of breath    Hypokalemia    Dysphagia    Narcotic drug use    History of esophageal cancer    Palliative care by specialist    Neoplasm related pain       Esophagobronchial  fistula    Pln:  EGD    Raheel Ritchie MD  2/20/2024  7:59 AM

## 2024-02-20 NOTE — DISCHARGE SUMMARY
Outpatient Surgery Brief Discharge Summary         Patient ID:  Dontae Cortez Jr.  [id number]  53 year old  10/15/1970    Discharge Diagnoses: Bronchial esophageal fistula, right mainstem endobrochial lesion    Procedures: bronchoscopy    Discharged Condition: stable    Disposition: home    Patient Instructions: Follow-up with Chapo Madrid MD in 1-2 weeks.    Diet: regular diet  Activity: as tolerated    Chapo Madrid MD  2/20/2024  8:17 AM

## 2024-02-20 NOTE — ANESTHESIA POSTPROCEDURE EVALUATION
[facility]    Dontae Buddy Cortez  Patient Status:  Hospital Outpatient Surgery   Age/Gender 53 year old male MRN [id number]   Location [facility] Attending Raheel Ritchie MD   Hosp Day # 0 PCP Adrian Horowitz MD       Anesthesia Post-op Note    ESOPHAGOGASTRODUODENOSCOPY (EGD) ([facility]) AND BRONCHOSCOPY ([facility]) WITH BIOPSY    Procedure Summary       Date: 02/20/24 Room / Location:  ENDOSCOPY 01 /  ENDOSCOPY    Anesthesia Start: 0716 Anesthesia Stop: 0759    Procedures:       ESOPHAGOGASTRODUODENOSCOPY (EGD) ([facility]) AND BRONCHOSCOPY ([facility]) WITH BIOPSY      BRONCHOSCOPY Diagnosis:       Esophageal dysphagia      Cough, unspecified type      Malignant neoplasm of esophagus, unspecified location (HCC)      Acquired bronchoesophageal fistula (HCC)      (Bronchial esophageal fistula, right mainstem endobrochial lesion)    Surgeons: Raheel Ritchie MD Anesthesiologist: Dony Olivera MD    Anesthesia Type: general, MAC ASA Status: 3            Anesthesia Type: general, MAC    Vitals Value Taken Time   /59 02/20/24 0800   Temp  02/20/24 0804   Pulse 50 02/20/24 0803   Resp 16 02/20/24 0800   SpO2 94 % 02/20/24 0803   Vitals shown include unfiled device data.    Patient Location: Endoscopy    Anesthesia Type: MAC    Airway Patency: patent    Postop Pain Control: adequate    Mental Status: preanesthetic baseline    Nausea/Vomiting: none    Cardiopulmonary/Hydration status: stable euvolemic    Complications: no apparent anesthesia related complications    Postop vital signs: stable    Dental Exam: Unchanged from Preop

## 2024-02-20 NOTE — DISCHARGE INSTRUCTIONS
Home Care Instructions for Gastroscopy with Sedation    Diet:  - Resume your regular diet as tolerated unless otherwise instructed.  - Start with light meals to minimize bloating.  - Do not drink alcohol today.    Medication:  - If you have questions about resuming your normal medications, please contact your Primary Care Physician.    Activities:  - Take it easy today. Do not return to work today.  - Do not drive today.  - Do not operate any machinery today (including kitchen equipment).    Gastroscopy:  - You may have a sore throat for 2-3 days following the exam. This is normal. Gargling with warm salt water (1/2 tsp salt to 1 glass warm water) or using throat lozenges will help.  - If you experience any sharp pain in your neck, abdomen or chest, vomiting of blood, oral temperature over 100 degrees Fahrenheit, light-headedness or dizziness, or any other problems, contact your doctor.    Outpatient Instructions Following Bronchoscopy    1. Due to after effect of the medication given during your bronchoscopy, we would advise that for 12 hours after the procedure you:   - not return to work   - not drive a car or other vehicle   - not operate machinery (including kitchen equipment)   - not drink alcohol    2. Prior to your examination, a local anesthetic was used to numb the back of your throat. Do not eat for one to two hours. Sip fluids initially and advance to your regular diet as tolerated.    3. Tenderness or swelling occasionally occur at the site where medication was injected. Should this occur, it is helpful to apply heat to the area, and notify your doctor.    4. Contact your doctor if you experience the following:   - difficulty breathing   - fever greater than 101   - large amount of blood in sputum (small amount of blood in sputum expected after biopsy)    5. If a biopsy or cytology was performed, you will be notified should further investigation be recommended. Your referring physician will receive a  full report of your examination and will contact you.    6. DOCTOR'S COMMENTS (if applicable):      **If unable to reach your doctor, please go to the [facility] Emergency Room**    - Your referring physician will receive a full report of your examination.  - If you do not hear from your doctor's office within two weeks of your biopsy, please call them for your results.

## 2024-02-20 NOTE — INTERVAL H&P NOTE
Pre-op Diagnosis: Esophageal dysphagia [R13.19]  Cough, unspecified type [R05.9]  Malignant neoplasm of esophagus, unspecified location (HCC) [C15.9]  Acquired bronchoesophageal fistula (HCC) [J86.0]    The above referenced H&P was reviewed by Chapo Madrid MD on 2/20/2024, the patient was examined and no significant changes have occurred in the patient's condition since the H&P was performed.  I discussed with the patient and/or legal representative the potential benefits, risks and side effects of this procedure; the likelihood of the patient achieving goals; and potential problems that might occur during recuperation.  I discussed reasonable alternatives to the procedure, including risks, benefits and side effects related to the alternatives and risks related to not receiving this procedure.  We will proceed with procedure as planned.

## 2024-02-20 NOTE — PROGRESS NOTES
BRIEF ONCOLOGY NUTRITION F/U NOTE: as per chart review, pt was due for treatment today but will hold treatment in deference to EGD/Bronch planned for tomorrow. RD will continue to monitor.

## 2024-02-20 NOTE — OPERATIVE REPORT
Dontae Cortez Jr. Patient Status:  Hospital Outpatient Surgery    10/15/1970 MRN [id number]   [facility] ENDOSCOPY [facility]IN [facility] Attending Raheel Ritchie MD   Date 2024 PCP Adrian Horowitz MD     PREOPERATIVE DIAGNOSIS/INDICATION: 53 year old male with underlying metastatic malignant process primary esophageal adenocarcinoma and pancreas adenocarcinoma 1ary vs metastatic, post esophagectomy and gastric pull through, developed esophagobronchial fistula 2ary to deep ulceration at distal esophagus/anastomosis, failed endoscopic stenting bronchial and esophageal with persistent fistula, also failed attempt at over the scope clip, with aspiration symptoms, unable to tolerate fluids due to immediate cough, we placed an ASD atrial septal defect occluder for this intention  POSTOPERTATIVE DIAGNOSIS: Same  PROCEDURE PERFORMED: EGD and see bronchoscopy report for concomitant procedure  TIME OUT WAS PERFORMED    SEDATION: MAC sedation provided by General Anesthesia    INFORMED CONSENT: Risks, benefits and alternatives to the procedure were explained to the patient including but not limited to bleeding, infection, perforation, adverse drug reactions, pancreatitis and the need for hospitalization and surgery if this occurs, the patient understands and agrees to procedure.  PROCEDURE DESCRIPTION: A regular upper endoscope was introduced into the patient’s mouth, hypo pharynx, esophagus, stomach and the first and second portion of the duodenum, retroflexion of the endoscope was performed in the stomach. Careful examination of the above described areas was performed on withdrawal of the endoscope. The patient tolerated the procedure well, there were no immediate complication immediately following the procedure, and the patient was transferred to recovery in stable condition.  FINDINGS:  ESOPHAGUS: Post surgical changes c/w distal esophagectomy and gastric pull through, just proximal to the anastomosis  we identified the ASD device in good position, distal to this we identified a mucosal opening at the anastomosis which ended representing the proximal opening of a mucosal tunnel/bridge  GASTRIC: Post surgical changes and gastric pull through, patent pyloric, post POEM scar at antrum, migrated esophageal stent into gastric lumen  THERAPEUTICS: Dynamic contrast and saline/methylene blue injection was performed through the scope into the esophageal lumen at the region of the ASD, minimal blue staining noted at bronchial side, we identified at the anastomosis distal to the ASD an opening, this was injected and probed with a guidewire and demonstrated it represented a bridge into the gastric cavity a few centimeter distally  RECOMMENDATIONS:   Post EGD precautions, watch for bleeding, infection, perforation, adverse drug reactions   If persistent symptoms consider Esophagogram and replacement of the [place] ASD for an Amplatzer type ASD  Raheel Ritchie MD  2/20/2024  7:44 AM

## 2024-02-21 ENCOUNTER — TELEPHONE (OUTPATIENT)
Facility: CLINIC | Age: 54
End: 2024-02-21

## 2024-02-22 ENCOUNTER — OFFICE VISIT (OUTPATIENT)
Dept: HEMATOLOGY/ONCOLOGY | Age: 54
End: 2024-02-22
Attending: INTERNAL MEDICINE
Payer: COMMERCIAL

## 2024-02-22 VITALS
SYSTOLIC BLOOD PRESSURE: 112 MMHG | WEIGHT: 175.5 LBS | OXYGEN SATURATION: 95 % | TEMPERATURE: 98 F | RESPIRATION RATE: 16 BRPM | BODY MASS INDEX: 22.52 KG/M2 | HEART RATE: 57 BPM | HEIGHT: 73.82 IN | DIASTOLIC BLOOD PRESSURE: 70 MMHG

## 2024-02-22 DIAGNOSIS — R05.3 CHRONIC COUGH: ICD-10-CM

## 2024-02-22 DIAGNOSIS — C15.5 MALIGNANT NEOPLASM OF LOWER THIRD OF ESOPHAGUS (HCC): Primary | ICD-10-CM

## 2024-02-22 DIAGNOSIS — C15.9 ESOPHAGEAL CARCINOMA (HCC): Primary | ICD-10-CM

## 2024-02-22 LAB
ALBUMIN SERPL-MCNC: 3 G/DL (ref 3.4–5)
ALBUMIN/GLOB SERPL: 0.7 {RATIO} (ref 1–2)
ALP LIVER SERPL-CCNC: 111 U/L
ALT SERPL-CCNC: 20 U/L
ANION GAP SERPL CALC-SCNC: 4 MMOL/L (ref 0–18)
AST SERPL-CCNC: 14 U/L (ref 15–37)
BASOPHILS # BLD AUTO: 0.03 X10(3) UL (ref 0–0.2)
BASOPHILS NFR BLD AUTO: 0.4 %
BILIRUB SERPL-MCNC: 0.4 MG/DL (ref 0.1–2)
BUN BLD-MCNC: 12 MG/DL (ref 9–23)
CALCIUM BLD-MCNC: 8.6 MG/DL (ref 8.5–10.1)
CEA SERPL-MCNC: 1.2 NG/ML (ref ?–5)
CHLORIDE SERPL-SCNC: 103 MMOL/L (ref 98–112)
CO2 SERPL-SCNC: 28 MMOL/L (ref 21–32)
CREAT BLD-MCNC: 0.73 MG/DL
EGFRCR SERPLBLD CKD-EPI 2021: 108 ML/MIN/1.73M2 (ref 60–?)
EOSINOPHIL # BLD AUTO: 0.12 X10(3) UL (ref 0–0.7)
EOSINOPHIL NFR BLD AUTO: 1.6 %
ERYTHROCYTE [DISTWIDTH] IN BLOOD BY AUTOMATED COUNT: 15.1 %
GLOBULIN PLAS-MCNC: 4.2 G/DL (ref 2.8–4.4)
GLUCOSE BLD-MCNC: 125 MG/DL (ref 70–99)
HCT VFR BLD AUTO: 34.8 %
HGB BLD-MCNC: 11.3 G/DL
IMM GRANULOCYTES # BLD AUTO: 0.04 X10(3) UL (ref 0–1)
IMM GRANULOCYTES NFR BLD: 0.5 %
LYMPHOCYTES # BLD AUTO: 0.97 X10(3) UL (ref 1–4)
LYMPHOCYTES NFR BLD AUTO: 12.6 %
MCH RBC QN AUTO: 30.2 PG (ref 26–34)
MCHC RBC AUTO-ENTMCNC: 32.5 G/DL (ref 31–37)
MCV RBC AUTO: 93 FL
MONOCYTES # BLD AUTO: 1 X10(3) UL (ref 0.1–1)
MONOCYTES NFR BLD AUTO: 13 %
NEUTROPHILS # BLD AUTO: 5.51 X10 (3) UL (ref 1.5–7.7)
NEUTROPHILS # BLD AUTO: 5.51 X10(3) UL (ref 1.5–7.7)
NEUTROPHILS NFR BLD AUTO: 71.9 %
OSMOLALITY SERPL CALC.SUM OF ELEC: 281 MOSM/KG (ref 275–295)
PLATELET # BLD AUTO: 192 10(3)UL (ref 150–450)
POTASSIUM SERPL-SCNC: 4.1 MMOL/L (ref 3.5–5.1)
PROT SERPL-MCNC: 7.2 G/DL (ref 6.4–8.2)
RBC # BLD AUTO: 3.74 X10(6)UL
SODIUM SERPL-SCNC: 135 MMOL/L (ref 136–145)
T4 FREE SERPL-MCNC: 1 NG/DL (ref 0.8–1.7)
TSI SER-ACNC: 0.77 MIU/ML (ref 0.36–3.74)
WBC # BLD AUTO: 7.7 X10(3) UL (ref 4–11)

## 2024-02-22 PROCEDURE — 85025 COMPLETE CBC W/AUTO DIFF WBC: CPT | Performed by: INTERNAL MEDICINE

## 2024-02-22 PROCEDURE — 96413 CHEMO IV INFUSION 1 HR: CPT

## 2024-02-22 PROCEDURE — 82378 CARCINOEMBRYONIC ANTIGEN: CPT | Performed by: INTERNAL MEDICINE

## 2024-02-22 PROCEDURE — 96368 THER/DIAG CONCURRENT INF: CPT

## 2024-02-22 PROCEDURE — 96415 CHEMO IV INFUSION ADDL HR: CPT

## 2024-02-22 PROCEDURE — 80053 COMPREHEN METABOLIC PANEL: CPT | Performed by: INTERNAL MEDICINE

## 2024-02-22 PROCEDURE — 96375 TX/PRO/DX INJ NEW DRUG ADDON: CPT

## 2024-02-22 PROCEDURE — 99215 OFFICE O/P EST HI 40 MIN: CPT | Performed by: NURSE PRACTITIONER

## 2024-02-22 PROCEDURE — 96417 CHEMO IV INFUS EACH ADDL SEQ: CPT

## 2024-02-22 RX ORDER — FLUOROURACIL 50 MG/ML
2400 INJECTION, SOLUTION INTRAVENOUS CONTINUOUS
Status: CANCELLED | OUTPATIENT
Start: 2024-02-22

## 2024-02-22 RX ORDER — FLUOROURACIL 50 MG/ML
2400 INJECTION, SOLUTION INTRAVENOUS CONTINUOUS
Status: DISCONTINUED | OUTPATIENT
Start: 2024-02-22 | End: 2024-02-22

## 2024-02-22 RX ADMIN — FLUOROURACIL 5000 MG: 50 INJECTION, SOLUTION INTRAVENOUS at 14:23:00

## 2024-02-22 NOTE — TELEPHONE ENCOUNTER
Spoke to patient he states he has been coughing more black,yellow and blood colored mucus about 1/2 jyotsna size, chills no fevers and sob.     Per Dr. Madrid have him see our APN Juanita next week. Appt given 2/27

## 2024-02-22 NOTE — PROGRESS NOTES
Pt here for C16 D1 Drug(s): Opdivo/Trazimera/Oxaliplatin/Leucovorin/5FU.  Arrives Ambulating independently, accompanied by Self     Patient was evaluated today by LD and Treatment Nurse.    Oral medications included in this regimen:  no    Patient confirms comprehension of cancer treatment schedule:  yes    Pregnancy screening:  Not applicable    Modifications in dose or schedule:  No    Medications appearance and physical integrity checked by RN: yes.    Chemotherapy IV pump settings verified by 2 RNs:  Yes.  Frequency of blood return and site check throughout administration: Prior to administration, Prior to each drug, and At completion of therapy     Infusion/treatment outcome:  patient tolerated treatment without incident    Education Record    Learner:  Patient  Barriers / Limitations:  None  Method:  Discussion  Education / instructions given:  Reviewed plan of care and follow up appts.  Outcome:  Shows understanding    Discharged Home, Ambulating independently, accompanied by:Self    Patient/family verbalized understanding of future appointments: by [employer] messaging

## 2024-02-22 NOTE — PROGRESS NOTES
Cancer Center Progress Note    Patient Name: Dontae Cortez Jr.   YOB: 1970   Medical Record Number: [id number]   CSN: [id number]   Date of visit: 2/22/2024     Chief Complaint/Reason for Visit:  Chief Complaint   Patient presents with    Follow - Up      History of Present Illness: Ronen presents today for follow up and chemotherapy. He has metastatic esophageal cancer and his medical oncologist is Dr. Senia Foster. Patient restarted chemotheray and immunotherapy on 2/5/2024 with FOLFOX, trastuzumab and nivolumab. Earlier this week, chemotherapy was delayed due to planned EGD/bronchoscopy that occurred on 2/20/2024. Operative report mentions removing moderate amount of mucous near the ASD occluder device, friable mucosa with small ulcer in proximal bronchus and removal of right main stem lesion. Pathology of this lesion revealed inflamed edematous bronchial wall tissue with squamous metaplasia and congested blood vessels.     Today, patient reports ongoing mucous production with cough, mostly occurring at night despite sleeping with several pillows. He denies recent fever or chills. He reports that his pain is controlled.     Diagnosis:   Metastatic Esophageal Cancer. Possible 2nd pancreatic primary?  -Initially cT3 N1 Esophageal Adenocarcinoma (stage IIIB)  -HER2 amplified by FISH     Treatment History:   1. Neoadjuvant chemoRT with carbo-taxol: 7/6/22-8/10/22     2. laparoscopic robotic-assisted esophagogastrectomy with feeding jejunostomy tube placement on 9/30/2022.      3. Adjuvant Opdivo: 1/9/23-2/20/23     ---Metastatic disease developed-----     5. Chemo-Herceptin-Immunotherapy: 5/1/23-09/2023 --> Switched to Herceptin/Immunotherapy maintenance on 9/25/23 due to negative signatera. FOLFOX and herceptin restarted on 2/5/24 due to progression        Problem List:  Patient Active Problem List   Diagnosis    Primary hypertension    Hyperlipidemia with target low density lipoprotein (LDL)  cholesterol less than 70 mg/dL    Coronary artery disease involving native coronary artery of native heart without angina pectoris    S/P CABG x 4    Nontoxic multinodular goiter    Pulmonary nodules    Thrombophlebitis leg    BRANDAN (generalized anxiety disorder)    Right shoulder Arthroscopy acromioplasty ,distal  clavicle resection, rotator cuff/labral debridment  Global 05/13/2021    Right bicipital tenosynovitis    Malignant neoplasm of esophagus (HCC)    Esophageal anastomotic leak    Esophageal obstruction    On total parenteral nutrition (TPN)    Mechanical complication of esophagostomy (HCC)    Abdominal pain of unknown etiology    Migration of esophageal stent    Gastroparesis    Esophageal carcinoma (HCC)    Normocytic anemia    Esophageal fistula    Subacute cough    Acquired bronchoesophageal fistula (HCC)    Pneumonia of both lower lobes due to infectious organism    Malignant neoplasm of pancreas (HCC)    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 difficile colitis    Dysphagia, unspecified type    Shortness of breath    Hypokalemia    Dysphagia    Narcotic drug use    History of esophageal cancer    Palliative care by specialist    Neoplasm related pain    Endobronchial mass        Medical History:  Past Medical History:   Diagnosis Date    Back problem     Belching 02/01/2022    Black stools 02/01/2022    Borderline diabetes     Dx in 8/2013 - HgA1C 6.2%    C. difficile diarrhea 10/23/2022    pt treated and without symptoms    Chest pain 02/01/2022    Coronary artery disease     On 8/16/13: CABG x 4 with LIMA to LAD and SVG to diagonal, OM and PDA     Decorative tattoo 01/01/2000    Depression     Difficult intubation     h/o esophagectomy for CA; developed esophagobronchial vistula    Disorder of liver     LIVER CA    Esophageal cancer (HCC)     completed chemo    Esophageal reflux     Essential hypertension     Exposure to medical diagnostic radiation     last tx 8/18/2022    Frequent urination 01/01/2013    Gastroparesis     Heartburn 02/01/2022    High blood pressure     High cholesterol 08/01/2013    Found when I had quadruple bypass    History of COVID-19 10/16/2022    asymptomatic - pt was dx during a hospitalization for another diagnosis. No continued symptoms    History of stomach ulcers     Hyperlipidemia     Hyperlipidemia LDL goal < 70     Indigestion 02/01/2022    Morbid obesity with BMI of 40.0-44.9, adult (HCC)     Muscle weakness     Nontoxic multinodular goiter     Dx in 8/2013: pt was told that imaging showed thyroid cysts per PCP    Pancreatic cancer (HCC)     last dose 12/4/2023 is scheduled for another round 12/27/23    Peripheral vascular disease (HCC)     pt denies    Personal history of antineoplastic chemotherapy     for esophageal cancer/completed    Personal history of antineoplastic chemotherapy 12/2023    pancreatic cancer    Problems with swallowing 02/01/2022    Pulmonary nodules     Dx in 8/2013: CT chest showed small bilateral fissural-based lung nodules less than 1 cm    S/P CABG x 4     On 8/16/13: CABG x 4 with LIMA to LAD and SVG to diagonal, OM and PDA    Shortness of breath     when coughing; no oxygen    Vomiting 02/01/2022       Surgical History:  Past Surgical History:   Procedure Laterality Date    APPENDECTOMY      APPENDECTOMY      ARTHROSCOPY OF JOINT UNLISTED      right shoulder    CABG      On 8/16/13: CABG x 4 with LIMA to LAD and SVG to diagonal, OM and PDA    CARDIAC CATH LAB      On 8/14/2013: cardiac cath showed 3-vessel disease    OTHER SURGICAL HISTORY      1.       Laparoscopic robotic-assisted  esophagogastrectomy.    PORT, INDWELLING, IMP         Allergies:  No Known Allergies    Family History:  Family History   Problem Relation Age of Onset    Cancer Mother         breast and colon     Diabetes Neg        Social History:  Social History     Socioeconomic History    Marital status:      Spouse name: Not on file    Number of children: 3    Years of education: Not on file    Highest education level: Not on file   Occupational History    Occupation: works as  - on workman's comp   Tobacco Use    Smoking status: Former     Packs/day: 1.00     Years: 27.00     Additional pack years: 0.00     Total pack years: 27.00     Types: Cigarettes     Quit date: 8/15/2013     Years since quitting: 10.5    Smokeless tobacco: Never    Tobacco comments:     Quit smoking 2013   Vaping Use    Vaping Use: Never used   Substance and Sexual Activity    Alcohol use: Not Currently    Drug use: Never    Sexual activity: Not Currently     Partners: Female   Other Topics Concern     Service Not Asked    Blood Transfusions Not Asked    Caffeine Concern Yes    Occupational Exposure Not Asked    Hobby Hazards Not Asked    Sleep Concern Not Asked    Stress Concern No    Weight Concern No    Special Diet No    Back Care Not Asked    Exercise No    Bike Helmet Not Asked    Seat Belt No    Self-Exams Not Asked   Social History Narrative    Lives with life partner    Has 3 daughters - 1 lives closeby, 1 in [place], 1 in [place]     Social Determinants of Health     Financial Resource Strain: Low Risk  (12/14/2023)    Financial Resource Strain     Difficulty of Paying Living Expenses: Not very hard     Med Affordability: No   Food Insecurity: No Food Insecurity (1/17/2024)    Food Insecurity     Food Insecurity: Never true   Transportation Needs: No Transportation Needs (1/17/2024)    Transportation Needs     Lack of Transportation: No   Physical Activity: Not on file   Stress: Not on file   Social Connections: Not on file    Housing Stability: Low Risk  (1/17/2024)    Housing Stability     Housing Instability: No     Housing Instability Emergency: No       Medications:    Current Outpatient Medications:     ondansetron (ZOFRAN) 8 MG tablet, Take 1 tablet (8 mg total) by mouth every 8 (eight) hours as needed for Nausea., Disp: 30 tablet, Rfl: 3    Pancrelipase, Lip-Prot-Amyl, (CREON) 50835-63584 units Oral Cap DR Particles, Take 2 capsules with meals and 1 capsule with snacks, Disp: 240 capsule, Rfl: 0    prochlorperazine (COMPAZINE) 10 mg tablet, Take 1 tablet (10 mg total) by mouth every 6 (six) hours as needed for Nausea., Disp: 30 tablet, Rfl: 3    gabapentin 300 MG Oral Cap, Take 1 capsule (300 mg total) by mouth in the morning and 1 capsule (300 mg total) before bedtime., Disp: 180 capsule, Rfl: 0    guaiFENesin-codeine 100-10 MG/5ML Oral Solution, Take 5 mL by mouth every 6 (six) hours as needed for cough., Disp: 237 mL, Rfl: 1    morphINE ER 15 MG Oral Tab CR, Take 1 tablet (15 mg total) by mouth every 12 (twelve) hours., Disp: 60 tablet, Rfl: 0    HYDROcodone-acetaminophen 5-325 MG Oral Tab, Take 1-2 tablets by mouth every 4 (four) hours as needed for Pain., Disp: 60 tablet, Rfl: 0    benzonatate 100 MG Oral Cap, Take 1 capsule (100 mg total) by mouth 3 (three) times daily as needed for cough., Disp: 90 capsule, Rfl: 0    sertraline 100 MG Oral Tab, Take 2 tablets (200 mg total) by mouth daily., Disp: , Rfl:     losartan 50 MG Oral Tab, Take 1 tablet (50 mg total) by mouth daily., Disp: 90 tablet, Rfl: 2    Omeprazole 40 MG Oral Capsule Delayed Release, Take 1 capsule (40 mg total) by mouth 2 (two) times daily before meals., Disp: 90 capsule, Rfl: 3    metoprolol succinate ER 50 MG Oral Tablet 24 Hr, TAKE 1 TABLET BY MOUTH EVERY DAY, Disp: 90 tablet, Rfl: 1    Review of Systems:  A comprehensive 14 point review of systems was completed.  Pertinent positives and negatives noted in the HPI.    Performance Status: ECOG  1-2    Physical Examination:  General: Patient is alert and oriented x 3, not in acute distress.  Vital Signs: Height: 187.5 cm (6' 1.82\") (02/22 0923)  Weight: 79.6 kg (175 lb 8 oz) (02/22 0923)  BSA (Calculated - sq m): 2.05 sq meters (02/22 0923)  Pulse: 57 (02/22 0923)  BP: 112/70 (02/22 0923)  Temp: 97.9 °F (36.6 °C) (02/22 0923)  Do Not Use - Resp Rate: --  SpO2: 95 % (02/22 0923)  HEENT: Anicteric, conjunctivae and sclerae clear, no oropharyngeal lesion/thrush, mucous membranes are moist   Chest: Clear to auscultation. Respirations unlabored. Left side diminished throughout  Heart: Regular rate and rhythm.   Abdomen: Soft, non-distended, non-tender with present bowel sounds.  Extremities: No edema.  Neurological: Grossly intact.   Lymphatics: There is no palpable lymphadenopathy throughout in the cervical or supraclavicular regions.   Skin: warm, dry, no erythema or rash   Psych/Depression: mood and affect are appropriate.     Labs:     Recent Results (from the past 72 hour(s))   Specimen to Pathology Tissue    Collection Time: 02/20/24  9:07 AM   Result Value Ref Range    Case Report       Surgical Pathology                                Case: [id number]                                   Authorizing Provider:  Raheel Ritchie MD      Collected:           02/20/2024 09:07 AM          Ordering Location:     [facility]  Received:            02/20/2024 10:53 AM                                 [facility]                                                                  Pathologist:           Jeanmarie Hines MD                                                           Specimen:    Endobronchial biopsy ELLEN                                                                   Final Diagnosis:       Endobronchial biopsy, right mainstem lesion:  -Acutely inflamed edematous bronchial wall tissue, with squamous metaplasia and congested blood vessels.  -Few bacteria on surface.  -Fungus stain negative for  fungal organisms.  -Negative for malignancy.      Embedded Images      Ancillary Studies       Special Stain(s):     Material:  Block A1  Population:  Tissue  Stain   Result  GMS F                            Negative        Positive tissue controls were utilized in the staining process.  These slides were reviewed by the signout Pathologist and showed appropriate staining results.    Interpreted by: Jeanmarie Hines MD     Methodology: Histochemical stains are performed on formalin-fixed, paraffin-embedded tissue sections.  All staining processes are validated by [facility] Department of Pathology to document appropriate staining reactions.  Positive controls are utilized.      Clinical Information       R13.19 Esophageal Dysphagia.  R05.9 Cough, Unspecified Type.  C15.9 Malignant Neoplasm Of Esophagus, Unspecified Location (Hcc).  J86.0 Acquired Bronchoesophageal Fistula (Hcc).    53-year-old former smoker (27 pack years cigarettes).  2022 esophageal mass biopsy-invasive adenocarcinoma.  2022 gastroesophagectomy-poorly differentiated adenocarcinoma of lower esophagus to GE junction with posttreatment changes.  2023 right lung [id number] needle core biopsies-peculiar histiocytic infiltrate  April 2023 pancreatic head mass fine-needle biopsies-positive for adenocarcinoma.  November 2023 anastomosis esophageal biopsy-gastric mucosa with ulceration/erosion, inflammation, and reactive changes.  Patient developed esophageal bronchial fistula secondary to deep ulceration at distal esophagus/anastomosis.  An ASD occluder device placed.  At bronchoscopy, just distal to the occluder device, there was friable mucosa with small ulcer in the proximal bronchus intermedius.  There was also a protuberant lesion in the proximal right mainstem bronchus, which was biopsied.         Gross Description       A. Labeled with the patient's name, MRN, and \"endobronchial biopsy ELLEN\"  Received in formalin: Multiple soft, pink-tan  tissue fragments measuring 0.3 x 0.3 x 0.2 cm in aggregate.  The specimen is submitted entirely in A1-A2.    (PAUL)     Comp Metabolic Panel (14)    Collection Time: 02/22/24  8:59 AM   Result Value Ref Range    Glucose 125 (H) 70 - 99 mg/dL    Sodium 135 (L) 136 - 145 mmol/L    Potassium 4.1 3.5 - 5.1 mmol/L    Chloride 103 98 - 112 mmol/L    CO2 28.0 21.0 - 32.0 mmol/L    Anion Gap 4 0 - 18 mmol/L    BUN 12 9 - 23 mg/dL    Creatinine 0.73 0.70 - 1.30 mg/dL    Calcium, Total 8.6 8.5 - 10.1 mg/dL    Calculated Osmolality 281 275 - 295 mOsm/kg    eGFR-Cr 108 >=60 mL/min/1.73m2    AST 14 (L) 15 - 37 U/L    ALT 20 16 - 61 U/L    Alkaline Phosphatase 111 45 - 117 U/L    Bilirubin, Total 0.4 0.1 - 2.0 mg/dL    Total Protein 7.2 6.4 - 8.2 g/dL    Albumin 3.0 (L) 3.4 - 5.0 g/dL    Globulin  4.2 2.8 - 4.4 g/dL    A/G Ratio 0.7 (L) 1.0 - 2.0    Patient Fasting for CMP? Patient not present    CBC W/ DIFFERENTIAL    Collection Time: 02/22/24  8:59 AM   Result Value Ref Range    WBC 7.7 4.0 - 11.0 x10(3) uL    RBC 3.74 (L) 4.30 - 5.70 x10(6)uL    HGB 11.3 (L) 13.0 - 17.5 g/dL    HCT 34.8 (L) 39.0 - 53.0 %    .0 150.0 - 450.0 10(3)uL    MCV 93.0 80.0 - 100.0 fL    MCH 30.2 26.0 - 34.0 pg    MCHC 32.5 31.0 - 37.0 g/dL    RDW 15.1 %    Neutrophil Absolute Prelim 5.51 1.50 - 7.70 x10 (3) uL    Neutrophil Absolute 5.51 1.50 - 7.70 x10(3) uL    Lymphocyte Absolute 0.97 (L) 1.00 - 4.00 x10(3) uL    Monocyte Absolute 1.00 0.10 - 1.00 x10(3) uL    Eosinophil Absolute 0.12 0.00 - 0.70 x10(3) uL    Basophil Absolute 0.03 0.00 - 0.20 x10(3) uL    Immature Granulocyte Absolute 0.04 0.00 - 1.00 x10(3) uL    Neutrophil % 71.9 %    Lymphocyte % 12.6 %    Monocyte % 13.0 %    Eosinophil % 1.6 %    Basophil % 0.4 %    Immature Granulocyte % 0.5 %       Impression/Plan    Metastatic esophageal cancer: restarted FOLFOX and trastuzumab on 2/5/2024, delayed earlier this week due to planned EGD/bronch. Labs reviewed, proceed with treatment  today, receiving nivolumab, FOLFOX and trastuzumab. Plan for repeat PET after 4 cycles.     Productive cough: likely related to bronchoesophageal fistula. Chest xray two days ago with no consolidation. Has ASD occluder recently with mucous removed. Continue antitussives-discussed non-pharmacological management like steam showers, humidifier, honey.     Planned Follow Up: 3/11/2024 follow up with Dr. Foster, labs and chemo    Risk Level: HIGH metastatic esophageal cancer receiving chemotherapy requiring close monitoring     The 21st Century Cures Act makes medical notes like these available to patients in the interest of transparency. Please be advised this is a medical document. Medical documents are intended to carry relevant information, facts as evident, and the clinical opinion of the practitioner. The medical note is intended as peer to peer communication and may appear blunt or direct. It is written in medical language and may contain abbreviations or verbiage that are unfamiliar.     Electronically Signed by:    ARTEMIO Brown, NP-C  Nurse Practitioner  [facility]

## 2024-02-22 NOTE — PROGRESS NOTES
Pt here for follow up and treatment.   He complains of fatigue, productive cough with red, yellow sputum and nausea and vomiting.   Pt complains of pain on right side.

## 2024-02-29 ENCOUNTER — VIRTUAL PHONE E/M (OUTPATIENT)
Facility: CLINIC | Age: 54
End: 2024-02-29
Payer: COMMERCIAL

## 2024-02-29 DIAGNOSIS — Z85.01 HISTORY OF ESOPHAGEAL CANCER: ICD-10-CM

## 2024-02-29 DIAGNOSIS — R91.8 PULMONARY NODULES: ICD-10-CM

## 2024-02-29 DIAGNOSIS — J86.0: Primary | ICD-10-CM

## 2024-02-29 DIAGNOSIS — R05.3 CHRONIC COUGH: ICD-10-CM

## 2024-02-29 NOTE — PROGRESS NOTES
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 and esophageal stenting here now post stent removal and ASD occluder device placement 1/17/2024, CAD/CABG  who is seen today in follow up post hospitalization. Since he was last seen in the hospital in mid January post removal of endobronchial stent and placement of ASD occluder device in the bronchoesophageal fistula, he had coughing leading to his return to the hospital at the end of January but left AMA prior to being seen by the GI service. He notes has had continued episodes of coughing post eating/drinking which seem less than before the ASD occluder device was placed but still annoying him.  More recently has had mild pain in his lower right chest/ upper abdomen for the past week, seems to be worse with movement/bending or other events such as coughing.  Phlegm has been normal, rarely green.  Did have some blood several weeks ago. No fevers.       Past Medical History:   Past Medical History:   Diagnosis Date    Back problem     Belching 02/01/2022    Black stools 02/01/2022    Borderline diabetes     Dx in 8/2013 - HgA1C 6.2%    C. difficile diarrhea 10/23/2022    pt treated and without symptoms    Chest pain 02/01/2022    Coronary artery disease     On 8/16/13: CABG x 4 with LIMA to LAD and SVG to diagonal, OM and PDA    Decorative tattoo 01/01/2000    Depression     Difficult intubation     h/o esophagectomy for CA; developed esophagobronchial vistula    Disorder of liver     LIVER CA    Esophageal cancer (HCC)     completed chemo    Esophageal reflux      Essential hypertension     Exposure to medical diagnostic radiation     last tx 8/18/2022    Frequent urination 01/01/2013    Gastroparesis     Heartburn 02/01/2022    High blood pressure     High cholesterol 08/01/2013    Found when I had quadruple bypass    History of COVID-19 10/16/2022    asymptomatic - pt was dx during a hospitalization for another diagnosis. No continued symptoms    History of stomach ulcers     Hyperlipidemia     Hyperlipidemia LDL goal < 70     Indigestion 02/01/2022    Morbid obesity with BMI of 40.0-44.9, adult (HCC)     Muscle weakness     Nontoxic multinodular goiter     Dx in 8/2013: pt was told that imaging showed thyroid cysts per PCP    Pancreatic cancer (HCC)     last dose 12/4/2023 is scheduled for another round 12/27/23    Peripheral vascular disease (HCC)     pt denies    Personal history of antineoplastic chemotherapy     for esophageal cancer/completed    Personal history of antineoplastic chemotherapy 12/2023    pancreatic cancer    Problems with swallowing 02/01/2022    Pulmonary nodules     Dx in 8/2013: CT chest showed small bilateral fissural-based lung nodules less than 1 cm    S/P CABG x 4     On 8/16/13: CABG x 4 with LIMA to LAD and SVG to diagonal, OM and PDA    Shortness of breath     when coughing; no oxygen    Vomiting 02/01/2022        Past Surgical History:   Past Surgical History:   Procedure Laterality Date    APPENDECTOMY      APPENDECTOMY      ARTHROSCOPY OF JOINT UNLISTED      right shoulder    CABG      On 8/16/13: CABG x 4 with LIMA to LAD and SVG to diagonal, OM and PDA    CARDIAC CATH LAB      On 8/14/2013: cardiac cath showed 3-vessel disease    OTHER SURGICAL HISTORY      1.       Laparoscopic robotic-assisted esophagogastrectomy.    PORT, INDWELLING, IMP           Family Medical History:   Family History   Problem Relation Age of Onset    Cancer Mother         breast and colon     Diabetes Neg         Social History:   Social History     Socioeconomic  History    Marital status:      Spouse name: Not on file    Number of children: 3    Years of education: Not on file    Highest education level: Not on file   Occupational History    Occupation: works as  - on workman's comp   Tobacco Use    Smoking status: Former     Packs/day: 1.00     Years: 27.00     Additional pack years: 0.00     Total pack years: 27.00     Types: Cigarettes     Quit date: 8/15/2013     Years since quitting: 10.5    Smokeless tobacco: Never    Tobacco comments:     Quit smoking 2013   Vaping Use    Vaping Use: Never used   Substance and Sexual Activity    Alcohol use: Not Currently    Drug use: Never    Sexual activity: Not Currently     Partners: Female   Other Topics Concern     Service Not Asked    Blood Transfusions Not Asked    Caffeine Concern Yes    Occupational Exposure Not Asked    Hobby Hazards Not Asked    Sleep Concern Not Asked    Stress Concern No    Weight Concern No    Special Diet No    Back Care Not Asked    Exercise No    Bike Helmet Not Asked    Seat Belt No    Self-Exams Not Asked   Social History Narrative    Lives with life partner    Has 3 daughters - 1 lives closeby, 1 in [place], 1 in [place]     Social Determinants of Health     Financial Resource Strain: Low Risk  (12/14/2023)    Financial Resource Strain     Difficulty of Paying Living Expenses: Not very hard     Med Affordability: No   Food Insecurity: No Food Insecurity (1/17/2024)    Food Insecurity     Food Insecurity: Never true   Transportation Needs: No Transportation Needs (1/17/2024)    Transportation Needs     Lack of Transportation: No   Physical Activity: Not on file   Stress: Not on file   Social Connections: Not on file   Housing Stability: Low Risk  (1/17/2024)    Housing Stability     Housing Instability: No     Housing Instability Emergency: No        Medications:   Current Outpatient Medications   Medication Sig Dispense Refill    ondansetron (ZOFRAN) 8 MG tablet Take 1 tablet (8  mg total) by mouth every 8 (eight) hours as needed for Nausea. 30 tablet 3    Pancrelipase, Lip-Prot-Amyl, (CREON) 44718-28762 units Oral Cap DR Particles Take 2 capsules with meals and 1 capsule with snacks 240 capsule 0    prochlorperazine (COMPAZINE) 10 mg tablet Take 1 tablet (10 mg total) by mouth every 6 (six) hours as needed for Nausea. 30 tablet 3    gabapentin 300 MG Oral Cap Take 1 capsule (300 mg total) by mouth in the morning and 1 capsule (300 mg total) before bedtime. 180 capsule 0    guaiFENesin-codeine 100-10 MG/5ML Oral Solution Take 5 mL by mouth every 6 (six) hours as needed for cough. 237 mL 1    morphINE ER 15 MG Oral Tab CR Take 1 tablet (15 mg total) by mouth every 12 (twelve) hours. 60 tablet 0    HYDROcodone-acetaminophen 5-325 MG Oral Tab Take 1-2 tablets by mouth every 4 (four) hours as needed for Pain. 60 tablet 0    benzonatate 100 MG Oral Cap Take 1 capsule (100 mg total) by mouth 3 (three) times daily as needed for cough. 90 capsule 0    sertraline 100 MG Oral Tab Take 2 tablets (200 mg total) by mouth daily.      losartan 50 MG Oral Tab Take 1 tablet (50 mg total) by mouth daily. 90 tablet 2    Omeprazole 40 MG Oral Capsule Delayed Release Take 1 capsule (40 mg total) by mouth 2 (two) times daily before meals. 90 capsule 3    metoprolol succinate ER 50 MG Oral Tablet 24 Hr TAKE 1 TABLET BY MOUTH EVERY DAY 90 tablet 1       Review of Systems: Review of Systems     Physical Exam:  There were no vitals taken for this visit.     Constitutional: alert, cooperative. No acute distress.  HEENT: Head NC/AT. Mask in place  Cardio: Regular rate and rhythm. Normal S1 and S2. No murmurs.   Respiratory: Thorax symmetrical with no labored breathing. Clear to ausculation bilaterally with symmetrical breath sounds. No wheezing, rhonchi, rales, or crackles.   GI: NABS. Abd soft, non-tender.  Extremities: No clubbing or cyanosis. No LE edema.    Neurologic: A&Ox3. No gross motor deficits.  Skin: Warm,  dry  Psych: Calm, cooperative. Pleasant affect.    Results:  Personally reviewed    WBC: 7.7, done on 2/22/2024.  HGB: 11.3, done on 2/22/2024.  PLT: 192, done on 2/22/2024.     Glucose: 125, done on 2/22/2024.  Cr: 0.73, done on 2/22/2024.  GFR(AA): 120, done on 7/26/2022.  GFR (non-AA): 104, done on 7/26/2022.  CA: 8.6, done on 2/22/2024.  Na: 135, done on 2/22/2024.  K: 4.1, done on 2/22/2024.  Cl: 103, done on 2/22/2024.  CO2: 28, done on 2/22/2024.  Last ALB was 3% done on 2/22/2024.     CT Chest, Abdomen, and Pelvis with IV Contrast 2/27/24 at [facility]  FINDINGS:     Lung Bases:  Numerous small consolidations and groundglass densities with nodules at the bilateral lung bases predominantly. Areas of scarring and atelectasis. Findings are likely due to aspiration/infection. Peribronchial thickening.     Mediastinum: Left port in place. Numerous prominent mediastinal nodes.     Heart: Normal in size. Coronary artery disease.     Liver: Steatosis. Ill-defined area of hypoattenuation within the right likely due to known metastatic.     Adrenal Glands: Unremarkable     Spleen: Unremarkable     Pancreas: Atrophic, not well assessed on this exam     Stomach: Esophagectomy and gastric pull-through. Gastric component shows a large amount of material extending up to the thoracic inlet and increasing risk of aspiration..     Small and Large Bowel: Nondilated. Large stool burden. Appendix not visualized..     Kidneys: Hypodensities that cannot be well characterized but likely cysts. No hydronephrosis or nephrolithiasis     Bladder: Unremarkable     Peritoneum: No free fluid or focal fluid collections. No lymphadenopathy. Ill-defined fat-containing area seen at the anterior mesentery with surrounding stranding measuring 4 cm. This may represent an omental infarct or malignant lesion as prior imaging is not available.     Vascular: Diffuse atherosclerotic disease of the aorta and its major branches. No pulmonary embolism  identified. Aberrant right subclavian artery.     Osseous Structures: Grossly unremarkable     Other: None     ======================================     IMPRESSION:   Provided history for this exam: Pulmonary embolism (PE) suspected, high prob   With regards to this history, the following impression is made:   No pulmonary embolism identified   Findings of multifocal lung infection likely due to aspiration possibly related to gastric pull through         XR GUIDE, GI DILATION (BUA=26458)    Result Date: 2/20/2024  CONCLUSION:  See above.   LOCATION:  [facility]   Dictated by (CST): Yahir To MD on 2/20/2024 at 9:03 AM     Finalized by (CST): Yahir To MD on 2/20/2024 at 9:21 AM       XR CHEST AP PORTABLE  (CPT=71045)    Result Date: 2/20/2024  CONCLUSION:  Persistent small right pleural effusion.   LOCATION:  [facility]      Dictated by (CST): Shar Rousseau MD on 2/20/2024 at 9:13 AM     Finalized by (CST): Shar Rousseau MD on 2/20/2024 at 9:14 AM       CT CHEST(CONTRAST ONLY) (CPT=71260)    Result Date: 1/29/2024  CONCLUSION:  Relatively dilute contrast attenuation within the gastroesophageal pull-through, with no definite contrast entering the airways on this exam.  Mildly enlarged mediastinal lymph nodes.  Concern for pneumonia or aspiration in the right lower lobe.   LOCATION:  [id number]   Dictated by (CST): Ernesto Cabrera MD on 1/29/2024 at 12:03 PM     Finalized by (CST): Ernesto Cabrera MD on 1/29/2024 at 12:08 PM       XR CHEST PA + LAT CHEST (CPT=71046)    Result Date: 1/29/2024  CONCLUSION:  1. Small right-sided pleural effusion with right basilar atelectasis/infiltrates.  2. Preliminary report was provided by [facility] at time of examination.  Final report confirms findings on preliminary report without discrepancy.   LOCATION:  [facility]   Dictated by (CST): Jenny Navarrete MD on 1/29/2024 at 7:42 AM     Finalized by (CST): Jenny Navarrete MD on 1/29/2024 at 7:43 AM       XR CHEST AP/PA (1 VIEW)  (CPT=71045)    Result Date: 1/26/2024  CONCLUSION:  1. Blunting the right costophrenic angle which may represent chronic pleural reaction versus tiny effusion.  2. Minimal right basilar atelectasis/infiltrates.    LOCATION:  [facility]      Dictated by (CST): Jenny Navarrete MD on 1/26/2024 at 10:46 AM     Finalized by (CST): Jenny Navarrete MD on 1/26/2024 at 10:51 AM       XR CHEST AP PORTABLE  (CPT=71045)    Result Date: 1/17/2024  CONCLUSION:  Postsurgical changes of median sternotomy.  Stable heart size and pulmonary vascularity.  Port-A-Cath tip SVC.  There is a small right pleural effusion unchanged.  Slight improvement in interstitial prominence/infiltrates in the left upper lobe and lung bases when compared to prior exam.  No pneumothorax.   LOCATION:  [facility]      Dictated by (CST): Gayle Alegre MD on 1/17/2024 at 1:11 PM     Finalized by (CST): Gayle Alegre MD on 1/17/2024 at 1:13 PM       XR ENDO BILE DUCT (CPT=74328)    Result Date: 1/17/2024  CONCLUSION:  As above.   LOCATION:  [facility]   Dictated by (CST): Joseph Mcdaniels DO on 1/17/2024 at 12:41 PM     Finalized by (CST): Joseph Mcdaniels DO on 1/17/2024 at 12:43 PM       XR ESOPHAGUS SINGLE CONTRAST (CPT=74220)    Result Date: 1/9/2024  CONCLUSION:  Postsurgical changes of gastric pull-through with mild dysmotility.  No evidence of significant stricture or fistula.   LOCATION:  [facility]   Dictated by (CST): Ovidio Patel MD on 1/09/2024 at 12:28 PM     Finalized by (CST): Ovidio Patel MD on 1/09/2024 at 12:31 PM       XR VIDEO SWALLOW (CPT=74230)    Result Date: 1/9/2024  PROCEDURE:  XR VIDEO SWALLOW (CPT=74230)  TECHNIQUE:  Video fluoroscopic swallowing study was performed in cooperation with the speech pathologist.  Barium of varying consistencies was administered orally with patient in lateral projection.  COMPARISON:  [facility] , XR, XR VIDEO SWALLOW (CPT=74230), 1/02/2024, 8:58 AM.  INDICATIONS:  coughing/choking with PO liwuid intake  PATIENT STATED  HISTORY: (As transcribed by Technologist)  The patient offered no additional history at this point.   CINE CAPTURES:  5 FLUORSCOPY TIME:  43 sec RADIATION DOSE (AIR KERMA PRODUCT):  22.6 uGy/m2   FINDINGS:  PHARYNGEAL PHASE:  Normal for age. ASPIRATION:  None. PENETRATION:  Normal. OTHER:  Negative.  PLEASE SEE SPEECH PATHOLOGIST REPORT FOR FULL ASSESSMENT OF SWALLOWING MECHANISM.   LOCATION:  [facility]    Dictated by (CST): Ovidio Patel MD on 1/09/2024 at 12:11 PM     Finalized by (CST): Ovidio Patel MD on 1/09/2024 at 12:11 PM       XR CHEST AP PORTABLE  (CPT=71045)    Result Date: 1/8/2024  CONCLUSION:  1. There is some interval clearing of opacity from left upper lobe which is probably improved pneumonia. 2. Persistent right costophrenic angle blunting is consistent with effusion or atelectasis. 3. There is a stent in the right mainstem bronchus which is stable.   LOCATION:        Dictated by (CST): Kraig Tristan MD on 1/08/2024 at 7:31 PM     Finalized by (CST): Kraig Tristan MD on 1/08/2024 at 7:32 PM       PET STANDARD BODY SCAN (ONCOLOGY) (CPT=78815)    Result Date: 1/4/2024  CONCLUSION:  1. Diffuse ground-glass opacity in the left lung, which has intervally increased in comparison to 12/30/2023.  There is new FDG avid mediastinal lymphadenopathy, which may be reactive versus malignant.  Continued attention on follow-up is recommended.  Continued follow-up to resolution of the ground-glass opacities recommended. 2. New FDG avid hepatic and pancreatic lesions.  The uptake near the pancreatic uncinate process is similar to 4/10/2023, which had resolved on 7/27/2023 exam.  This is suspicious for recurrence of disease.   LOCATION:  [facility]    Dictated by ([facility]): Shar Simms MD on 1/04/2024 at 2:03 PM     Finalized by (CST): Shar Simms MD on 1/04/2024 at 2:59 PM       XR VIDEO SWALLOW (CPT=74230)    Result Date: 1/2/2024  PROCEDURE:  XR VIDEO SWALLOW (CPT=74230)  TECHNIQUE:  Video fluoroscopic swallowing  study was performed in cooperation with the speech pathologist.  Barium of varying consistencies was administered orally with patient in lateral projection.  COMPARISON:  [facility] , XR, XR VIDEO SWALLOW (CPT=74230), 11/18/2022, 2:09 PM.  INDICATIONS:  Dysphagia  PATIENT STATED HISTORY: (As transcribed by Technologist)  patient states history of trouble swallowing and coughing while eating and drinking. EGD, esophageal stent removed, and UGI test done within the last few months without improvement to symptoms.   CINE CAPTURES:  7 FLUORSCOPY TIME:  1 minute 45 seconds RADIATION DOSE (AIR KERMA PRODUCT):  65.1 uGy/m2   FINDINGS:  No penetration or aspiration was seen with thin liquids, honey, nectar, purees, or solids.  PLEASE SEE SPEECH PATHOLOGIST REPORT FOR FULL ASSESSMENT OF SWALLOWING MECHANISM.   LOCATION:  [facility]    Dictated by (CST): Yahir To MD on 1/02/2024 at 9:18 AM     Finalized by (CST): Yahir To MD on 1/02/2024 at 9:19 AM       XR CHEST AP PORTABLE  (CPT=71045)    Result Date: 1/2/2024  CONCLUSION:  Bilateral interstitial infiltrates with interval worsening infiltrates in the left lung compared to the previous exam from 12/29/2023.  Differential considerations include atypical pneumonia and other inflammatory interstitial lung disease. 2. Cardiomegaly without edema.   LOCATION:  [facility]      Dictated by (CST): Cuong Wan MD on 1/02/2024 at 7:57 AM     Finalized by (CST): Cuong Wan MD on 1/02/2024 at 8:01 AM       CT CHEST+ABDOMEN+PELVIS(ALL CNTRST ONLY)(CPT=71260/70623)    Result Date: 12/30/2023  CONCLUSION:  1. Relative to the prior exam, there are increasing ground-glass opacities throughout the left upper lobe and to a lesser degree the left lower lobe, superimposed over diffuse reticular nodular opacities, suggestive of a progressive atypical pneumonia. 2. Suggestion of colitis involving the descending and rectosigmoid colon. 3. Please see the body of the report above for further,  less significant details.  The preliminary report was reviewed.    LOCATION:  [facility]    Dictated by (CST): Joseph Mcdaniels DO on 12/30/2023 at 9:50 AM     Finalized by (CST): Joseph Mcdaniels DO on 12/30/2023 at 9:58 AM       XR CHEST AP PORTABLE  (CPT=71045)    Result Date: 12/29/2023  CONCLUSION:   Postoperative changes of CABG with stable cardiac and mediastinal contours.  Reticular opacities of the right lung base and patchy airspace disease of the left upper lobe compatible with infectious/inflammatory process, not substantially changed in the interim.  Small right pleural effusion is stable.  No pneumothorax.  Left IJ chest port terminates at the cavoatrial junction.   LOCATION:  [facility]      Dictated by (CST): Richie Jay MD on 12/29/2023 at 4:40 PM     Finalized by (CST): Richie Jay MD on 12/29/2023 at 4:43 PM       XR CHEST PA + LAT CHEST (CPT=71046)    Result Date: 12/26/2023  CONCLUSION:  There is patchy consolidation in the left upper lobe that is concerning for pneumonia.  Clinical correlation recommended along with follow-up until complete resolution.  Small right pleural effusion.  Trace left pleural effusion.   LOCATION:  [facility]   Dictated by (CST): Immanuel Matthews MD on 12/26/2023 at 10:03 AM     Finalized by (CST): Immanuel Matthews MD on 12/26/2023 at 10:04 AM       XR UGI/ESOPHAGUS SINGLE CONTRAST (CPT=74240)    Result Date: 12/18/2023  CONCLUSION:   1.  No evidence of contrast extravasation to suggest anastomotic leak at this time.  2.  Moderate to severe gastroesophageal reflux.   LOCATION:  [facility]   Dictated by (CST): Ovidio Patel MD on 12/18/2023 at 10:55 AM     Finalized by (CST): Ovidio Patel MD on 12/18/2023 at 10:59 AM       CT CHEST PE AORTA (IV ONLY) (CPT=71260)    Result Date: 12/16/2023  CONCLUSION:   1. No evidence of pulmonary embolus.  2. Previously noted stent in gastric pull-through region is no longer identified.  3. Numerous scattered nodules throughout the lungs are again  identified.  Minimal increased ground-glass densities in the left upper lobe are noted.  These are nonspecific.  Ground-glass nodular densities in left lower lobe are increased.  This measures approximately 2.2 x 2.0 cm.  2. Ground-glass consolidations in the lungs are mildly increased compared to prior examination.  This may be due to developing infectious process.  Numerous reticular nodular densities throughout the lungs are stable.    LOCATION:  [facility]   Dictated by (CST): Amilcar Jesus MD on 12/16/2023 at 10:01 PM     Finalized by (CST): Amilcar Jesus MD on 12/16/2023 at 10:21 PM       XR CHEST PA + LAT CHEST (CPT=71046)    Result Date: 12/16/2023  CONCLUSION:  No new consolidation.  Blunting of the right costophrenic angle is stable.   LOCATION:  [facility]   Dictated by (CST): Amilcar Jesus MD on 12/16/2023 at 9:37 PM     Finalized by (CST): Amilcar Jesus MD on 12/16/2023 at 9:37 PM       XR GUIDE, GI DILATION (OYY=[id number])    Result Date: 12/12/2023  CONCLUSION:   Solitary fluoroscopic image acquired during EGD procedure with visualization of endoscope and esophageal stent.  Please refer to dedicated procedure report for full detail.   LOCATION:  [facility]   Dictated by (CST): Richie Jay MD on 12/12/2023 at 5:58 PM     Finalized by (CST): Richie Jay MD on 12/12/2023 at 5:58 PM       CT ANGIOGRAPHY, CHEST (CPT=71275)    Result Date: 12/10/2023  CONCLUSION:   1. Negative for acute pulmonary embolism.  Widespread pulmonary nodules are redemonstrated.  No developing pleural effusion, or pneumothorax.  No acute lobar pneumonia seen.  2. Change in position of stent, now positioned in the thorax, previously partially at the hiatus.  3. Aberrant right subclavian artery.  At the current position of the stent, the aberrant subclavian artery crosses posterior to the esophagus about 1.6 cm above the upper margin of the stent.  If the stent migrates or is repositioned more superiorly, potentially the metal  of the stent could erode into the aberrant right subclavian artery.     LOCATION:  [facility]   Dictated by (CST): Ernesto Cabrera MD on 12/10/2023 at 9:16 PM     Finalized by (CST): Ernesto Cabrera MD on 12/10/2023 at 9:26 PM         Assessment/Plan:  #1. Bronchoesophageal fistula   s/p previous esophageal stenting (now removed 12/12/2023) and endobronchial stenting at [facility] (since removed) and now s/p ASD occluder device placement on 1/17/2024  Now with return of previous symptoms of postprandial coughing  His CT from Jan 29, 2024 does not demonstrate any obvious fistula, although there was new infiltrates in the RLL likely related to aspiration episodes  Given his symptoms, I would recommend he undergo bronchoscopy to evaluate the airway and would like to coordinate with GI - I will message Dr. Ritchie to determine timing/procedure.  2/2024 s/p bronch with endobronchial bx of right mainstem lesion 2/20/24  Bronch showed bronchoesophageal fistula and proximal ELLEN lesion   Reviewed results with pt, wife  and recommendations to return to [place] for stent placement; pt and wife would prefer to go to a different hospital; ? [facility]  Pt recently in ER 2/27/24 with sharp chest pains and found to have aspiration pna and started on Abx  Scheduled to see oncology APADILIA Bravo next week and Dr Foster 3/11/24; pt agreed to keep current appts to discuss next steps  I will review case with oncology APN and pt /wife aware to call with any questions or concerns     #2. Bilateral nodules/infiltrates   suspect related to aspiration episodes and/or COVID moreso than malignancy  Will need follow up CT imaging in the next few months  2/29/24 See above     #3.Postprandial cough   Related to above     #4. Metastatic esophageal adenocarcinoma s/p gastroesphagectomy 9/2022  Following with Dr. Foster on chemotherapy    Discussed with Dr Julius Mullen, ARTEM[facility]  [facility]   2/29/2024    This visit is conducted using Telemedicine with  live, interactive video and audio.    Patient has been referred to the [facility] website at [email or web address] to review the yearly Consent to Treat document.    Patient understands and accepts financial responsibility for any deductible, co-insurance and/or co-pays associated with this service.

## 2024-02-29 NOTE — PATIENT INSTRUCTIONS
Contact physician Dr Chetan Moura at [place] for possible stent placement unless changing to [place]  Continue antibiotics as directed  See Ana Luisa GOMEZ and Dr Foster as scheduled  Please contact office at [phone] with any decline in respiratory status, questions or concerns

## 2024-03-01 ENCOUNTER — TELEPHONE (OUTPATIENT)
Dept: HEMATOLOGY/ONCOLOGY | Facility: HOSPITAL | Age: 54
End: 2024-03-01

## 2024-03-01 NOTE — TELEPHONE ENCOUNTER
[facility]  Health store # [id number]  90 day prescription Request   :  BACLOFEN 10 MG TABLET   TAKE I TABLET BY MOUTH THREE TIMES A DAY  NEEDED     QTY  270.0     [facility]/pharmacy #[id number] [place], [place] [address] [facility] [phone], [phone]  this store sent in the fax Thanks Janett

## 2024-03-04 DIAGNOSIS — C15.9 ESOPHAGEAL CARCINOMA (HCC): ICD-10-CM

## 2024-03-04 DIAGNOSIS — G89.3 NEOPLASM RELATED PAIN: ICD-10-CM

## 2024-03-04 RX ORDER — ONDANSETRON HYDROCHLORIDE 8 MG/1
8 TABLET, FILM COATED ORAL EVERY 8 HOURS PRN
Qty: 30 TABLET | Refills: 3 | Status: SHIPPED | OUTPATIENT
Start: 2024-03-04 | End: 2024-03-25

## 2024-03-04 RX ORDER — PROCHLORPERAZINE MALEATE 10 MG
10 TABLET ORAL EVERY 6 HOURS PRN
Qty: 30 TABLET | Refills: 3 | Status: SHIPPED | OUTPATIENT
Start: 2024-03-04 | End: 2024-03-05

## 2024-03-04 RX ORDER — HYDROCODONE BITARTRATE AND ACETAMINOPHEN 5; 325 MG/1; MG/1
1 TABLET ORAL EVERY 4 HOURS PRN
Qty: 60 TABLET | Refills: 0 | Status: SHIPPED | OUTPATIENT
Start: 2024-03-04

## 2024-03-04 RX ORDER — HYDROCODONE BITARTRATE AND ACETAMINOPHEN 5; 325 MG/1; MG/1
1-2 TABLET ORAL EVERY 4 HOURS PRN
Qty: 60 TABLET | Refills: 0 | Status: CANCELLED | OUTPATIENT
Start: 2024-03-04

## 2024-03-05 ENCOUNTER — APPOINTMENT (OUTPATIENT)
Dept: HEMATOLOGY/ONCOLOGY | Age: 54
End: 2024-03-05
Attending: INTERNAL MEDICINE
Payer: COMMERCIAL

## 2024-03-05 ENCOUNTER — TELEPHONE (OUTPATIENT)
Dept: HEMATOLOGY/ONCOLOGY | Facility: HOSPITAL | Age: 54
End: 2024-03-05

## 2024-03-05 RX ORDER — BACLOFEN 10 MG/1
10 TABLET ORAL 3 TIMES DAILY PRN
Qty: 270 TABLET | Refills: 0 | Status: SHIPPED | OUTPATIENT
Start: 2024-03-05

## 2024-03-05 NOTE — TELEPHONE ENCOUNTER
[facility] pharmacy: 90 day prescription request  for : Baclofen 10 mg Tablet   quantity  270.0  [facility]/pharmacy #[id number] [facility] [address] [facility] [phone], [phone]  thanks Janett

## 2024-03-06 ENCOUNTER — HOSPITAL ENCOUNTER (INPATIENT)
Facility: HOSPITAL | Age: 54
LOS: 2 days | Discharge: HOME | End: 2024-03-09
Attending: EMERGENCY MEDICINE
Payer: COMMERCIAL

## 2024-03-06 ENCOUNTER — HOSPITAL ENCOUNTER (INPATIENT)
Facility: HOSPITAL | Age: 54
LOS: 2 days | Discharge: HOME | End: 2024-03-09
Attending: EMERGENCY MEDICINE | Admitting: EMERGENCY MEDICINE
Payer: COMMERCIAL

## 2024-03-06 ENCOUNTER — APPOINTMENT (OUTPATIENT)
Dept: GENERAL RADIOLOGY | Facility: HOSPITAL | Age: 54
End: 2024-03-06
Attending: EMERGENCY MEDICINE
Payer: COMMERCIAL

## 2024-03-06 ENCOUNTER — HOSPITAL ENCOUNTER (OUTPATIENT)
Dept: CT IMAGING | Facility: HOSPITAL | Age: 54
Discharge: HOME | End: 2024-03-06
Attending: INTERNAL MEDICINE
Payer: COMMERCIAL

## 2024-03-06 DIAGNOSIS — K31.84 GASTROPARESIS: ICD-10-CM

## 2024-03-06 DIAGNOSIS — R11.0 NAUSEA: ICD-10-CM

## 2024-03-06 DIAGNOSIS — R11.2 NAUSEA AND VOMITING, UNSPECIFIED VOMITING TYPE: ICD-10-CM

## 2024-03-06 DIAGNOSIS — J69.0 ASPIRATION PNEUMONITIS (HCC): Primary | ICD-10-CM

## 2024-03-06 PROBLEM — N17.9 ACUTE KIDNEY INJURY (HCC): Status: ACTIVE | Noted: 2024-01-01

## 2024-03-06 PROBLEM — D69.6 THROMBOCYTOPENIA (HCC): Status: ACTIVE | Noted: 2024-01-01

## 2024-03-06 PROBLEM — E87.1 HYPONATREMIA: Status: ACTIVE | Noted: 2024-01-01

## 2024-03-06 PROBLEM — E87.1 HYPONATREMIA: Status: ACTIVE | Noted: 2024-03-06

## 2024-03-06 PROBLEM — D69.6 THROMBOCYTOPENIA (HCC): Status: ACTIVE | Noted: 2024-03-06

## 2024-03-06 PROBLEM — N17.9 ACUTE KIDNEY INJURY (HCC): Status: ACTIVE | Noted: 2024-03-06

## 2024-03-06 PROBLEM — R73.9 HYPERGLYCEMIA: Status: ACTIVE | Noted: 2024-01-01

## 2024-03-06 PROBLEM — A04.72 C. DIFFICILE DIARRHEA: Status: ACTIVE | Noted: 2024-01-01

## 2024-03-06 PROBLEM — R73.9 HYPERGLYCEMIA: Status: ACTIVE | Noted: 2024-03-06

## 2024-03-06 PROBLEM — A04.72 C. DIFFICILE DIARRHEA: Status: ACTIVE | Noted: 2024-03-06

## 2024-03-06 LAB
ALBUMIN SERPL-MCNC: 2.6 G/DL (ref 3.4–5)
ALBUMIN/GLOB SERPL: 0.7 {RATIO} (ref 1–2)
ALP LIVER SERPL-CCNC: 101 U/L
ALT SERPL-CCNC: 24 U/L
ANION GAP SERPL CALC-SCNC: 2 MMOL/L (ref 0–18)
AST SERPL-CCNC: 19 U/L (ref 15–37)
BASOPHILS # BLD AUTO: 0.03 X10(3) UL (ref 0–0.2)
BASOPHILS NFR BLD AUTO: 0.5 %
BILIRUB SERPL-MCNC: 0.2 MG/DL (ref 0.1–2)
BUN BLD-MCNC: 15 MG/DL (ref 9–23)
CALCIUM BLD-MCNC: 8.3 MG/DL (ref 8.5–10.1)
CHLORIDE SERPL-SCNC: 104 MMOL/L (ref 98–112)
CO2 SERPL-SCNC: 29 MMOL/L (ref 21–32)
CREAT BLD-MCNC: 0.84 MG/DL
EGFRCR SERPLBLD CKD-EPI 2021: 104 ML/MIN/1.73M2 (ref 60–?)
EOSINOPHIL # BLD AUTO: 0.11 X10(3) UL (ref 0–0.7)
EOSINOPHIL NFR BLD AUTO: 1.8 %
ERYTHROCYTE [DISTWIDTH] IN BLOOD BY AUTOMATED COUNT: 14.3 %
GLOBULIN PLAS-MCNC: 3.9 G/DL (ref 2.8–4.4)
GLUCOSE BLD-MCNC: 244 MG/DL (ref 70–99)
HCT VFR BLD AUTO: 30.7 %
HGB BLD-MCNC: 10 G/DL
IMM GRANULOCYTES # BLD AUTO: 0.07 X10(3) UL (ref 0–1)
IMM GRANULOCYTES NFR BLD: 1.1 %
LYMPHOCYTES # BLD AUTO: 0.65 X10(3) UL (ref 1–4)
LYMPHOCYTES NFR BLD AUTO: 10.6 %
MCH RBC QN AUTO: 28.9 PG (ref 26–34)
MCHC RBC AUTO-ENTMCNC: 32.6 G/DL (ref 31–37)
MCV RBC AUTO: 88.7 FL
MONOCYTES # BLD AUTO: 0.43 X10(3) UL (ref 0.1–1)
MONOCYTES NFR BLD AUTO: 7 %
NEUTROPHILS # BLD AUTO: 4.83 X10 (3) UL (ref 1.5–7.7)
NEUTROPHILS # BLD AUTO: 4.83 X10(3) UL (ref 1.5–7.7)
NEUTROPHILS NFR BLD AUTO: 79 %
OSMOLALITY SERPL CALC.SUM OF ELEC: 289 MOSM/KG (ref 275–295)
PLATELET # BLD AUTO: 130 10(3)UL (ref 150–450)
POTASSIUM SERPL-SCNC: 3.9 MMOL/L (ref 3.5–5.1)
PROCALCITONIN SERPL-MCNC: 0.09 NG/ML (ref ?–0.16)
PROT SERPL-MCNC: 6.5 G/DL (ref 6.4–8.2)
RBC # BLD AUTO: 3.46 X10(6)UL
SODIUM SERPL-SCNC: 135 MMOL/L (ref 136–145)
WBC # BLD AUTO: 6.1 X10(3) UL (ref 4–11)

## 2024-03-06 PROCEDURE — 71260 CT THORAX DX C+: CPT | Performed by: INTERNAL MEDICINE

## 2024-03-06 PROCEDURE — 74160 CT ABDOMEN W/CONTRAST: CPT | Performed by: INTERNAL MEDICINE

## 2024-03-06 PROCEDURE — 99223 1ST HOSP IP/OBS HIGH 75: CPT | Performed by: STUDENT IN AN ORGANIZED HEALTH CARE EDUCATION/TRAINING PROGRAM

## 2024-03-06 PROCEDURE — 71045 X-RAY EXAM CHEST 1 VIEW: CPT | Performed by: EMERGENCY MEDICINE

## 2024-03-06 RX ORDER — HYDROMORPHONE HYDROCHLORIDE 1 MG/ML
0.5 INJECTION, SOLUTION INTRAMUSCULAR; INTRAVENOUS; SUBCUTANEOUS EVERY 30 MIN PRN
Status: COMPLETED | OUTPATIENT
Start: 2024-03-06 | End: 2024-03-06

## 2024-03-06 RX ORDER — ONDANSETRON 2 MG/ML
4 INJECTION INTRAMUSCULAR; INTRAVENOUS ONCE
Status: COMPLETED | OUTPATIENT
Start: 2024-03-06 | End: 2024-03-06

## 2024-03-07 ENCOUNTER — HOSPITAL ENCOUNTER (OUTPATIENT)
Dept: NUCLEAR MEDICINE | Facility: HOSPITAL | Age: 54
End: 2024-03-07
Attending: INTERNAL MEDICINE
Payer: COMMERCIAL

## 2024-03-07 PROBLEM — C78.7 MALIGNANT NEOPLASM METASTATIC TO LIVER (HCC): Status: ACTIVE | Noted: 2024-03-07

## 2024-03-07 PROBLEM — E78.5 HYPERLIPIDEMIA: Status: ACTIVE | Noted: 2024-03-07

## 2024-03-07 PROBLEM — C78.7 MALIGNANT NEOPLASM METASTATIC TO LIVER (HCC): Status: ACTIVE | Noted: 2024-01-01

## 2024-03-07 PROBLEM — E78.5 HYPERLIPIDEMIA: Status: ACTIVE | Noted: 2024-01-01

## 2024-03-07 LAB
ALBUMIN SERPL-MCNC: 2.7 G/DL (ref 3.4–5)
ALBUMIN/GLOB SERPL: 0.8 {RATIO} (ref 1–2)
ALP LIVER SERPL-CCNC: 94 U/L
ALT SERPL-CCNC: 23 U/L
ANION GAP SERPL CALC-SCNC: 2 MMOL/L (ref 0–18)
AST SERPL-CCNC: 16 U/L (ref 15–37)
BASOPHILS # BLD AUTO: 0.02 X10(3) UL (ref 0–0.2)
BASOPHILS NFR BLD AUTO: 0.4 %
BILIRUB SERPL-MCNC: 0.2 MG/DL (ref 0.1–2)
BUN BLD-MCNC: 13 MG/DL (ref 9–23)
CALCIUM BLD-MCNC: 8.6 MG/DL (ref 8.5–10.1)
CHLORIDE SERPL-SCNC: 104 MMOL/L (ref 98–112)
CO2 SERPL-SCNC: 30 MMOL/L (ref 21–32)
CREAT BLD-MCNC: 0.62 MG/DL
EGFRCR SERPLBLD CKD-EPI 2021: 114 ML/MIN/1.73M2 (ref 60–?)
EOSINOPHIL # BLD AUTO: 0.11 X10(3) UL (ref 0–0.7)
EOSINOPHIL NFR BLD AUTO: 2.1 %
ERYTHROCYTE [DISTWIDTH] IN BLOOD BY AUTOMATED COUNT: 14.3 %
GLOBULIN PLAS-MCNC: 3.5 G/DL (ref 2.8–4.4)
GLUCOSE BLD-MCNC: 89 MG/DL (ref 70–99)
HCT VFR BLD AUTO: 30.9 %
HGB BLD-MCNC: 9.7 G/DL
IMM GRANULOCYTES # BLD AUTO: 0.06 X10(3) UL (ref 0–1)
IMM GRANULOCYTES NFR BLD: 1.2 %
L PNEUMO AG UR QL: NEGATIVE
LYMPHOCYTES # BLD AUTO: 0.59 X10(3) UL (ref 1–4)
LYMPHOCYTES NFR BLD AUTO: 11.5 %
MCH RBC QN AUTO: 28.9 PG (ref 26–34)
MCHC RBC AUTO-ENTMCNC: 31.4 G/DL (ref 31–37)
MCV RBC AUTO: 92 FL
MONOCYTES # BLD AUTO: 0.51 X10(3) UL (ref 0.1–1)
MONOCYTES NFR BLD AUTO: 9.9 %
NEUTROPHILS # BLD AUTO: 3.86 X10 (3) UL (ref 1.5–7.7)
NEUTROPHILS # BLD AUTO: 3.86 X10(3) UL (ref 1.5–7.7)
NEUTROPHILS NFR BLD AUTO: 74.9 %
OSMOLALITY SERPL CALC.SUM OF ELEC: 282 MOSM/KG (ref 275–295)
PLATELET # BLD AUTO: 128 10(3)UL (ref 150–450)
POTASSIUM SERPL-SCNC: 3.9 MMOL/L (ref 3.5–5.1)
PROT SERPL-MCNC: 6.2 G/DL (ref 6.4–8.2)
RBC # BLD AUTO: 3.36 X10(6)UL
SODIUM SERPL-SCNC: 136 MMOL/L (ref 136–145)
STREP PNEUMO ANTIGEN, URINE: NEGATIVE
WBC # BLD AUTO: 5.2 X10(3) UL (ref 4–11)

## 2024-03-07 PROCEDURE — 99253 IP/OBS CNSLTJ NEW/EST LOW 45: CPT | Performed by: INTERNAL MEDICINE

## 2024-03-07 PROCEDURE — 99232 SBSQ HOSP IP/OBS MODERATE 35: CPT | Performed by: INTERNAL MEDICINE

## 2024-03-07 RX ORDER — BENZONATATE 200 MG/1
200 CAPSULE ORAL 3 TIMES DAILY PRN
Status: DISCONTINUED | OUTPATIENT
Start: 2024-03-07 | End: 2024-03-09

## 2024-03-07 RX ORDER — POLYETHYLENE GLYCOL 3350 17 G/17G
17 POWDER, FOR SOLUTION ORAL DAILY PRN
Status: DISCONTINUED | OUTPATIENT
Start: 2024-03-07 | End: 2024-03-09

## 2024-03-07 RX ORDER — HYDROMORPHONE HYDROCHLORIDE 1 MG/ML
0.4 INJECTION, SOLUTION INTRAMUSCULAR; INTRAVENOUS; SUBCUTANEOUS EVERY 2 HOUR PRN
Status: DISCONTINUED | OUTPATIENT
Start: 2024-03-07 | End: 2024-03-09

## 2024-03-07 RX ORDER — DIPHENHYDRAMINE HYDROCHLORIDE 50 MG/ML
12.5 INJECTION INTRAMUSCULAR; INTRAVENOUS NIGHTLY PRN
Status: DISCONTINUED | OUTPATIENT
Start: 2024-03-07 | End: 2024-03-09

## 2024-03-07 RX ORDER — SENNOSIDES 8.6 MG
17.2 TABLET ORAL NIGHTLY PRN
Status: DISCONTINUED | OUTPATIENT
Start: 2024-03-07 | End: 2024-03-09

## 2024-03-07 RX ORDER — VANCOMYCIN HYDROCHLORIDE 125 MG/1
125 CAPSULE ORAL DAILY
Status: DISCONTINUED | OUTPATIENT
Start: 2024-03-07 | End: 2024-03-09

## 2024-03-07 RX ORDER — ENEMA 19; 7 G/133ML; G/133ML
1 ENEMA RECTAL ONCE AS NEEDED
Status: DISCONTINUED | OUTPATIENT
Start: 2024-03-07 | End: 2024-03-09

## 2024-03-07 RX ORDER — GUAIFENESIN 600 MG/1
600 TABLET, EXTENDED RELEASE ORAL 2 TIMES DAILY PRN
Status: DISCONTINUED | OUTPATIENT
Start: 2024-03-07 | End: 2024-03-09

## 2024-03-07 RX ORDER — GABAPENTIN 300 MG/1
300 CAPSULE ORAL 2 TIMES DAILY
Status: DISCONTINUED | OUTPATIENT
Start: 2024-03-07 | End: 2024-03-09

## 2024-03-07 RX ORDER — HYDROMORPHONE HYDROCHLORIDE 1 MG/ML
0.2 INJECTION, SOLUTION INTRAMUSCULAR; INTRAVENOUS; SUBCUTANEOUS EVERY 2 HOUR PRN
Status: DISCONTINUED | OUTPATIENT
Start: 2024-03-07 | End: 2024-03-09

## 2024-03-07 RX ORDER — ONDANSETRON 2 MG/ML
4 INJECTION INTRAMUSCULAR; INTRAVENOUS EVERY 6 HOURS PRN
Status: DISCONTINUED | OUTPATIENT
Start: 2024-03-07 | End: 2024-03-09

## 2024-03-07 RX ORDER — SERTRALINE HYDROCHLORIDE 100 MG/1
200 TABLET, FILM COATED ORAL DAILY
Status: DISCONTINUED | OUTPATIENT
Start: 2024-03-07 | End: 2024-03-09

## 2024-03-07 RX ORDER — ECHINACEA PURPUREA EXTRACT 125 MG
1 TABLET ORAL
Status: DISCONTINUED | OUTPATIENT
Start: 2024-03-07 | End: 2024-03-09

## 2024-03-07 RX ORDER — METOPROLOL SUCCINATE 50 MG/1
50 TABLET, EXTENDED RELEASE ORAL
Status: DISCONTINUED | OUTPATIENT
Start: 2024-03-07 | End: 2024-03-09

## 2024-03-07 RX ORDER — PANTOPRAZOLE SODIUM 40 MG/1
40 TABLET, DELAYED RELEASE ORAL
Status: DISCONTINUED | OUTPATIENT
Start: 2024-03-07 | End: 2024-03-09

## 2024-03-07 RX ORDER — ACETAMINOPHEN 500 MG
500 TABLET ORAL EVERY 4 HOURS PRN
Status: DISCONTINUED | OUTPATIENT
Start: 2024-03-07 | End: 2024-03-09

## 2024-03-07 RX ORDER — BISACODYL 10 MG
10 SUPPOSITORY, RECTAL RECTAL
Status: DISCONTINUED | OUTPATIENT
Start: 2024-03-07 | End: 2024-03-09

## 2024-03-07 RX ORDER — DIPHENHYDRAMINE HCL 25 MG
25 CAPSULE ORAL NIGHTLY PRN
Status: DISCONTINUED | OUTPATIENT
Start: 2024-03-07 | End: 2024-03-09

## 2024-03-07 RX ORDER — PROCHLORPERAZINE EDISYLATE 5 MG/ML
5 INJECTION INTRAMUSCULAR; INTRAVENOUS EVERY 8 HOURS PRN
Status: DISCONTINUED | OUTPATIENT
Start: 2024-03-07 | End: 2024-03-09

## 2024-03-07 RX ORDER — HYDROMORPHONE HYDROCHLORIDE 1 MG/ML
0.8 INJECTION, SOLUTION INTRAMUSCULAR; INTRAVENOUS; SUBCUTANEOUS EVERY 2 HOUR PRN
Status: DISCONTINUED | OUTPATIENT
Start: 2024-03-07 | End: 2024-03-09

## 2024-03-07 RX ORDER — MELATONIN
3 NIGHTLY PRN
Status: DISCONTINUED | OUTPATIENT
Start: 2024-03-07 | End: 2024-03-09

## 2024-03-07 RX ORDER — ENOXAPARIN SODIUM 100 MG/ML
40 INJECTION SUBCUTANEOUS DAILY
Status: DISCONTINUED | OUTPATIENT
Start: 2024-03-07 | End: 2024-03-09

## 2024-03-07 RX ORDER — LOSARTAN POTASSIUM 50 MG/1
50 TABLET ORAL DAILY
Status: DISCONTINUED | OUTPATIENT
Start: 2024-03-07 | End: 2024-03-09

## 2024-03-07 NOTE — PLAN OF CARE
Patient has been doing well today, was up to shower this afternoon. Wife spent the afternoon at the bedside. Has had his meals through the day and taking pain meds per MAR. GI was asked this morning about having the out patient procedure, at this time they are going to hold off doing in patient while patient is being treated for pneumonia. Call light in reach, belongings at bedside.       Problem: PAIN - ADULT  Goal: Verbalizes/displays adequate comfort level or patient's stated pain goal  Description: INTERVENTIONS:  - Encourage pt to monitor pain and request assistance  - Assess pain using appropriate pain scale  - Administer analgesics based on type and severity of pain and evaluate response  - Implement non-pharmacological measures as appropriate and evaluate response  - Consider cultural and social influences on pain and pain management  - Manage/alleviate anxiety  - Utilize distraction and/or relaxation techniques  - Monitor for opioid side effects  - Notify MD/LIP if interventions unsuccessful or patient reports new pain  - Anticipate increased pain with activity and pre-medicate as appropriate  Outcome: Progressing

## 2024-03-07 NOTE — ED QUICK NOTES
Orders for admission, patient is aware of plan and ready to go upstairs. Any questions, please call ED RN Sondra at extension [phone].     Patient Covid vaccination status: Fully vaccinated and immunocompromised     COVID Test Ordered in ED: None    COVID Suspicion at Admission: N/A    Running Infusions:      Mental Status/LOC at time of transport: x4    Other pertinent information:   CIWA score: N/A   NIH score:  N/A

## 2024-03-07 NOTE — PROGRESS NOTES
NURSING ADMISSION NOTE      Patient admitted via Cart  Oriented to room.  Safety precautions initiated.  Bed in low position.  Call light in reach.    Patient admitted from ED. Alert and oriented x4. VSS, afebrile. Patient does complain of 10/10 pain, MD paged and orders placed and pain medication given per MAR. Denies shortness of breath or nausea. But does have a strong cough presently. Port-a-cath accessed in ED. Safety precautions in place. Call light in reach.

## 2024-03-07 NOTE — ED PROVIDER NOTES
Patient Seen in: [facility] Emergency Department      History     Chief Complaint   Patient presents with    Difficulty Breathing     Stated Complaint: call back for admission for pneuomina    Subjective:   HPI    Patient presents for admission for pneumonia.  The patient states that he has been having issues with cough productive of purulent sputum.  He states that he has been told that he aspirates.  He gets out of breath very easily.  He states that sometimes he coughs up blood as well.  He has had some chills but no fever.  His last chemo was the Monday before last.  He was seen at an outside emergency department a week ago and diagnosed with aspiration pneumonia.  It appears per the notes that he was placed on Augmentin and doxycycline.  He states he is not any better.  He also has a history of C. difficile and has been having diarrhea frequently.  He had a CT of his abdomen per his GI doctor today and he called him to state that he had pneumonia and needed to be admitted.  He states he is going to have another kind of scan tomorrow.    Objective:   Past Medical History:   Diagnosis Date    Back problem     Belching 02/01/2022    Black stools 02/01/2022    Borderline diabetes     Dx in 8/2013 - HgA1C 6.2%    C. difficile diarrhea 10/23/2022    pt treated and without symptoms    Chest pain 02/01/2022    Coronary artery disease     On 8/16/13: CABG x 4 with LIMA to LAD and SVG to diagonal, OM and PDA    Decorative tattoo 01/01/2000    Depression     Difficult intubation     h/o esophagectomy for CA; developed esophagobronchial vistula    Disorder of liver     LIVER CA    Esophageal cancer (HCC)     completed chemo    Esophageal reflux     Essential hypertension     Exposure to medical diagnostic radiation     last tx 8/18/2022    Frequent urination 01/01/2013    Gastroparesis     Heartburn 02/01/2022    High blood pressure     High cholesterol 08/01/2013    Found when I had quadruple bypass    History of  COVID-19 10/16/2022    asymptomatic - pt was dx during a hospitalization for another diagnosis. No continued symptoms    History of stomach ulcers     Hyperlipidemia     Hyperlipidemia LDL goal < 70     Indigestion 02/01/2022    Morbid obesity with BMI of 40.0-44.9, adult (HCC)     Muscle weakness     Nontoxic multinodular goiter     Dx in 8/2013: pt was told that imaging showed thyroid cysts per PCP    Pancreatic cancer (HCC)     last dose 12/4/2023 is scheduled for another round 12/27/23    Peripheral vascular disease (HCC)     pt denies    Personal history of antineoplastic chemotherapy     for esophageal cancer/completed    Personal history of antineoplastic chemotherapy 12/2023    pancreatic cancer    Problems with swallowing 02/01/2022    Pulmonary nodules     Dx in 8/2013: CT chest showed small bilateral fissural-based lung nodules less than 1 cm    S/P CABG x 4     On 8/16/13: CABG x 4 with LIMA to LAD and SVG to diagonal, OM and PDA    Shortness of breath     when coughing; no oxygen    Vomiting 02/01/2022              Past Surgical History:   Procedure Laterality Date    APPENDECTOMY      APPENDECTOMY      ARTHROSCOPY OF JOINT UNLISTED      right shoulder    CABG      On 8/16/13: CABG x 4 with LIMA to LAD and SVG to diagonal, OM and PDA    CARDIAC CATH LAB      On 8/14/2013: cardiac cath showed 3-vessel disease    OTHER SURGICAL HISTORY      1.       Laparoscopic robotic-assisted esophagogastrectomy.    PORT, INDWELLING, IMP                  Social History     Socioeconomic History    Marital status:     Number of children: 3   Occupational History    Occupation: works as  - on workman's comp   Tobacco Use    Smoking status: Former     Packs/day: 1.00     Years: 27.00     Additional pack years: 0.00     Total pack years: 27.00     Types: Cigarettes     Quit date: 8/15/2013     Years since quitting: 10.5    Smokeless tobacco: Never    Tobacco comments:     Quit smoking 2013   Vaping Use     Vaping Use: Never used   Substance and Sexual Activity    Alcohol use: Not Currently    Drug use: Never    Sexual activity: Not Currently     Partners: Female   Other Topics Concern    Caffeine Concern Yes    Stress Concern No    Weight Concern No    Special Diet No    Exercise No    Seat Belt No   Social History Narrative    Lives with life partner    Has 3 daughters - 1 lives closeby, 1 in [place], 1 in [place]     Social Determinants of Health     Financial Resource Strain: Low Risk  (12/14/2023)    Financial Resource Strain     Difficulty of Paying Living Expenses: Not very hard     Med Affordability: No   Food Insecurity: No Food Insecurity (1/17/2024)    Food Insecurity     Food Insecurity: Never true   Transportation Needs: No Transportation Needs (1/17/2024)    Transportation Needs     Lack of Transportation: No   Housing Stability: Low Risk  (1/17/2024)    Housing Stability     Housing Instability: No     Housing Instability Emergency: No              Review of Systems    Positive for stated complaint: call back for admission for pneuomina  Other systems are as noted in HPI.  Constitutional and vital signs reviewed.      All other systems reviewed and negative except as noted above.    Physical Exam     ED Triage Vitals   BP 03/06/24 1901 130/77   Pulse 03/06/24 1901 64   Resp 03/06/24 1901 22   Temp 03/06/24 1901 97.5 °F (36.4 °C)   Temp src 03/06/24 1901 Temporal   SpO2 03/06/24 1901 97 %   O2 Device 03/06/24 2002 None (Room air)       Current:/76   Pulse 58   Temp 98 °F (36.7 °C) (Oral)   Resp 16   Ht 188 cm (6' 2\")   Wt 79.4 kg   SpO2 97%   BMI 22.47 kg/m²         Physical Exam    General: Alert and oriented x3, appears thin and frail, in no acute respiratory distress.  HEENT: Normocephalic, atraumatic, pupils equal round and reactive to light.  Neck: Supple.  Cardiovascular: Regular rate and rhythm.  Respiratory: Lungs clear to auscultation but diminished bilaterally.  Abdomen: Soft, tender in  the right upper quadrant primarily-chronic per patient, no guarding or rebound, normal active bowel sounds.  Extremities: No CCE.  Skin: Warm and dry.    ED Course     Labs Reviewed   COMP METABOLIC PANEL (14) - Abnormal; Notable for the following components:       Result Value    Glucose 244 (*)     Sodium 135 (*)     Calcium, Total 8.3 (*)     Albumin 2.6 (*)     A/G Ratio 0.7 (*)     All other components within normal limits   CBC W/ DIFFERENTIAL - Abnormal; Notable for the following components:    RBC 3.46 (*)     HGB 10.0 (*)     HCT 30.7 (*)     .0 (*)     Lymphocyte Absolute 0.65 (*)     All other components within normal limits   PROCALCITONIN - Normal    Narrative:     Resulted by: batch: K, CO2, CL, NA, ALB, TBIL, ALT, AST, ALP, CA, CREA, BUN, GLUC,    CBC WITH DIFFERENTIAL WITH PLATELET    Narrative:     The following orders were created for panel order CBC With Differential With Platelet.  Procedure                               Abnormality         Status                     ---------                               -----------         ------                     CBC W/ DIFFERENTIAL[[id number]]          Abnormal            Final result                 Please view results for these tests on the individual orders.   RAINBOW DRAW LAVENDER   RAINBOW DRAW LIGHT GREEN   RAINBOW DRAW BLUE   BLOOD CULTURE   BLOOD CULTURE   GI STOOL PANEL BY PCR   C. DIFFICILE(TOXIGENIC)PCR   I personally reviewed the chest films and reticular nodular opacities of both lungs noted.  XR CHEST AP PORTABLE  (CPT=71045)    Result Date: 3/6/2024  PROCEDURE:  XR CHEST AP PORTABLE  (CPT=71045)  TECHNIQUE:  AP chest radiograph was obtained.  COMPARISON:  [facility] , XR, XR CHEST AP PORTABLE  (CPT=71045), 2/20/2024, 8:20 AM.  INDICATIONS:  call back for admission for pneuomina  PATIENT STATED HISTORY: (As transcribed by Technologist)    call back for admission for pneuomina               CONCLUSION:   Stable cardiac and mediastinal  contours.  Reticular nodular opacities of the mid/lower lungs bilaterally are seen to better advantage on recent CT of 03/06/2024.  No associated pleural effusion or pneumothorax.  Left IJ chest port terminates near the cavoatrial junction.   LOCATION:  [facility]      Dictated by (CST): Richie Jay MD on 3/06/2024 at 10:10 PM     Finalized by (CST): Richie Jay MD on 3/06/2024 at 10:11 PM       CT CHEST+ABDOMEN (ALL CNTRST ONLY) (CPT=71260/52170)    Result Date: 3/6/2024  PROCEDURE:  CT CHEST+ABDOMEN (ALL CNTRST ONLY) (CPT=71260/15827)  COMPARISON:  [facility] , CT, CT CHEST(CONTRAST ONLY) (CPT=71260), 1/29/2024, 11:46 AM., abdominal CT 12/30/2023, PET-CT 01/04/2024  INDICATIONS:  K31.84 Gastroparesis R11.2 Nausea and vomiting, unspecified vomiting type R11.0 Nausea  TECHNIQUE:  IV contrast-enhanced scanning was performed through the chest and abdomen.  Dose reduction techniques were used. Dose information is transmitted to the ACR (American College of Radiology) NRDR (National Radiology Data Registry) which includes  the Dose Index Registry.  PATIENT STATED HISTORY:(As transcribed by Technologist)  PT states F/U   CONTRAST USED:  80cc of Isovue 370  FINDINGS:   CHEST: LUNGS:  No central airway stenosis.  There are extensive patchy consolidative and tree-in-bud type opacities involving dependent portions of the upper lobes and lower lobes as well as portions of the middle lobe and lingula.  These appear waxing/waning compared to 01/29/2024 with interval improvement in some areas and interval worsening and others.  For reference, a larger nodular opacity of the peripheral left lower lobe measures 1.7 x 1.4 cm (series 3, image 100), not previously seen. MEDIASTINUM:  Few borderline caliber lymph nodes of the left lower paratracheal station are stable.  Stable calcified nodule of the right thyroid lobe.  Postoperative changes of esophagectomy with gastric pull-through.  Enteric contrast identified within  the gastric  pull-through.  No focal areas of contrast extravasation identified.  No upstream esophageal dilation to indicate stricture/obstruction. AIDAN:  No mass or adenopathy.  CARDIAC:  Postoperative changes of CABG.  Stable mild biatrial enlargement.  No pericardial effusion.  Extensive coronary calcification. PLEURA:  No mass or effusion.  CHEST WALL:  Left IJ chest port terminates near the cavoatrial junction. AORTA:  No aneurysm or dissection.  VASCULATURE:  No visible pulmonary arterial thrombus or attenuation.   ABDOMEN: LIVER:  Normal morphology.  Subtle hypodense lesions of segment 8 (series 2, image 174, 181) correspond to sites of hypermetabolism on PET-CT of 01/04/2024, highly suspicious for metastases.  The larger of these more superiorly within segment 8 measures 1.5 x 1.6 cm).  These appear generally similar in size compared to 12/30/2023. BILIARY:  No visible dilatation or calcification.  PANCREAS:  Moderate, diffuse parenchymal atrophy. SPLEEN:  No enlargement or focal lesion.  KIDNEYS:  No mass, obstruction, or calcification.  ADRENALS:  No enlargement or mass.  AORTA:  No aneurysm or dissection.  RETROPERITONEUM:  No adenopathy or mass.  BOWEL/MESENTERY:  No visible mass, obstruction, or bowel wall thickening.  Stable focus of omental fat necrosis of the right mid abdomen. ABDOMINAL WALL:  No mass or hernia.  BONES:  No bony lesion or fracture.             CONCLUSION:   1. Extensive patchy consolidative and tree-in-bud type opacities throughout the lungs with some areas improved and others worse compared to 01/29/2024.  Findings may reflect aspiration pneumonia or other inflammatory process.  Given the solid nodular components, metastatic disease cannot be excluded.  Recommend continued attention on follow-up.  2. Hypoattenuating lesions of hepatic segment 8 correspond to areas of FDG avidity on PET-CT of 01/04/2024 most consistent with metastases, generally stable in size in the interim.  3. No definite CT  correlate for focus of peripancreatic hypermetabolism noted on PET-CT of 01/04/2024.  No new sites of disease identified within the abdomen.    LOCATION:  [facility]   Dictated by (CST): Richie Jay MD on 3/06/2024 at 5:14 PM     Finalized by (CST): Richie Jay MD on 3/06/2024 at 5:30 PM             Medications   HYDROmorphone (Dilaudid) 1 MG/ML injection 0.5 mg (0.5 mg Intravenous Given 3/6/24 2153)   sodium chloride 0.9 % IV bolus 1,000 mL (0 mL Intravenous Paused 3/6/24 2150)   piperacillin-tazobactam (Zosyn) 4.5 g in dextrose 5% 100 mL IVPB-ADDV (4.5 g Intravenous New Bag 3/6/24 2150)   ondansetron (Zofran) 4 MG/2ML injection 4 mg (4 mg Intravenous Given 3/6/24 2123)     Patient was given Dilaudid with Zofran for his chronic pain.  He was given Zosyn for pneumonia after discussion with the hospitalist.  He was ordered for IV hydration.         MDM      Patient presents with complaint of pneumonia.  Differential diagnosis includes but is not limited to aspiration pneumonia and sepsis.  The patient's CT results were reviewed as well as his notes from his recent ED visit.  The patient appears to have aspiration pneumonia that is not improving.  He does not have a fever, leukocytosis or elevated procalcitonin level.  He has been hemodynamically stable here with normal oxygen saturations.  I do not therefore suspect sepsis.  He has a stable anemia.  He does also have hyperglycemia.  I discussed his case with Dr. Myles from the hospitalist service.  She has agreed to admit him for further treatment.  Given his complaint of diarrhea I will send a stool specimen when able.  Admission disposition: 3/6/2024  9:44 PM                                Medical Decision Making      Disposition and Plan     Clinical Impression:  1. Aspiration pneumonitis (HCC)         Disposition:  Admit  3/6/2024  9:44 pm    Follow-up:  No follow-up provider specified.        Medications Prescribed:  Current Discharge Medication List                             Hospital Problems       Present on Admission  Date Reviewed: 2/29/2024            ICD-10-CM Noted POA    * (Principal) Aspiration pneumonitis (HCC) J69.0 3/6/2024 Unknown    Acute kidney injury (HCC) N17.9 3/6/2024 Yes    Hyperglycemia R73.9 3/6/2024 Yes    Hyponatremia E87.1 3/6/2024 Yes    Thrombocytopenia (HCC) D69.6 3/6/2024 Yes

## 2024-03-07 NOTE — CONSULTS
[facility]  Pulmonary/Critical Care Consult note    Dontae Cortez Jr. Patient Status:  Inpatient    10/15/1970 MRN [id number]   Location [facility] 4NW-A Attending Sakina Hernandez MD   Hosp Day # 0 PCP Adrian Horowitz MD     Reason for Consultation:  Bilateral patchy lung infiltrates    History of Present Illness:  Dontae Cortez Jr. is a a(n) 53 year old male.  With history of coronary artery disease, pancreatic cancer, esophageal cancer, and history of C. difficile diarrhea infection who has history of aspiration pneumonia    The patient underwent CT of the abdomen and was noted to have l extensive lung consolidation with tree-in-bud opacities so patient was admitted to the hospital and pulmonary consultation was obtained.    The patient admits to symptoms of cough with intermittent hemoptysis, shortness of breath, and abdominal pains.  The patient has intermittent mid chest pains off-and-on.    History:  Past Medical History:   Diagnosis Date    Back problem     Belching 2022    Black stools 2022    Borderline diabetes     Dx in 2013 - HgA1C 6.2%    C. difficile diarrhea 10/23/2022    pt treated and without symptoms    Chest pain 2022    Coronary artery disease     On 13: CABG x 4 with LIMA to LAD and SVG to diagonal, OM and PDA    Decorative tattoo 2000    Depression     Difficult intubation     h/o esophagectomy for CA; developed esophagobronchial vistula    Disorder of liver     LIVER CA    Esophageal cancer (HCC)     completed chemo    Esophageal reflux     Essential hypertension     Exposure to medical diagnostic radiation     last tx 2022    Frequent urination 2013    Gastroparesis     Heartburn 2022    High blood pressure     High cholesterol 2013    Found when I had quadruple bypass    History of COVID-19 10/16/2022    asymptomatic - pt was dx during a hospitalization for another diagnosis. No continued symptoms    History of  stomach ulcers     Hyperlipidemia     Hyperlipidemia LDL goal < 70     Indigestion 02/01/2022    Morbid obesity with BMI of 40.0-44.9, adult (HCC)     Muscle weakness     Nontoxic multinodular goiter     Dx in 8/2013: pt was told that imaging showed thyroid cysts per PCP    Pancreatic cancer (HCC)     last dose 12/4/2023 is scheduled for another round 12/27/23    Peripheral vascular disease (HCC)     pt denies    Personal history of antineoplastic chemotherapy     for esophageal cancer/completed    Personal history of antineoplastic chemotherapy 12/2023    pancreatic cancer    Problems with swallowing 02/01/2022    Pulmonary nodules     Dx in 8/2013: CT chest showed small bilateral fissural-based lung nodules less than 1 cm    S/P CABG x 4     On 8/16/13: CABG x 4 with LIMA to LAD and SVG to diagonal, OM and PDA    Shortness of breath     when coughing; no oxygen    Vomiting 02/01/2022     Past Surgical History:   Procedure Laterality Date    APPENDECTOMY      APPENDECTOMY      ARTHROSCOPY OF JOINT UNLISTED      right shoulder    CABG      On 8/16/13: CABG x 4 with LIMA to LAD and SVG to diagonal, OM and PDA    CARDIAC CATH LAB      On 8/14/2013: cardiac cath showed 3-vessel disease    OTHER SURGICAL HISTORY      1.       Laparoscopic robotic-assisted esophagogastrectomy.    PORT, INDWELLING, IMP       Family History   Problem Relation Age of Onset    Cancer Mother         breast and colon     Diabetes Neg       reports that he quit smoking about 10 years ago. His smoking use included cigarettes. He has a 27 pack-year smoking history. He has never used smokeless tobacco. He reports that he does not currently use alcohol. He reports that he does not use drugs.    Allergies:  No Known Allergies    Medications:    Current Facility-Administered Medications:     piperacillin-tazobactam (Zosyn) 3.375 g in dextrose 5% 100 mL IVPB-ADDV, 3.375 g, Intravenous, Q8H    vancomycin (Vancocin) cap 125 mg, 125 mg, Oral, Daily     gabapentin (Neurontin) cap 300 mg, 300 mg, Oral, BID    losartan (Cozaar) tab 50 mg, 50 mg, Oral, Daily    metoprolol succinate ER (Toprol XL) 24 hr tab 50 mg, 50 mg, Oral, Daily Beta Blocker    pantoprazole (Protonix) DR tab 40 mg, 40 mg, Oral, QAM AC    sertraline (Zoloft) tab 200 mg, 200 mg, Oral, Daily    acetaminophen (Tylenol Extra Strength) tab 500 mg, 500 mg, Oral, Q4H PRN    melatonin tab 3 mg, 3 mg, Oral, Nightly PRN    guaiFENesin ER (Mucinex) 12 hr tab 600 mg, 600 mg, Oral, BID PRN    benzonatate (Tessalon) cap 200 mg, 200 mg, Oral, TID PRN    glycerin-hypromellose- (Artifical Tears) 0.2-0.2-1 % ophthalmic solution 1 drop, 1 drop, Both Eyes, QID PRN    sodium chloride (Saline Mist) 0.65 % nasal solution 1 spray, 1 spray, Each Nare, Q3H PRN    enoxaparin (Lovenox) 40 MG/0.4ML SUBQ injection 40 mg, 40 mg, Subcutaneous, Daily    ondansetron (Zofran) 4 MG/2ML injection 4 mg, 4 mg, Intravenous, Q6H PRN    prochlorperazine (Compazine) 10 MG/2ML injection 5 mg, 5 mg, Intravenous, Q8H PRN    polyethylene glycol (PEG 3350) (Miralax) 17 g oral packet 17 g, 17 g, Oral, Daily PRN    sennosides (Senokot) tab 17.2 mg, 17.2 mg, Oral, Nightly PRN    bisacodyl (Dulcolax) 10 MG rectal suppository 10 mg, 10 mg, Rectal, Daily PRN    fleet enema (Fleet) 7-19 GM/118ML rectal enema 133 mL, 1 enema, Rectal, Once PRN    HYDROmorphone (Dilaudid) 1 MG/ML injection 0.2 mg, 0.2 mg, Intravenous, Q2H PRN **OR** HYDROmorphone (Dilaudid) 1 MG/ML injection 0.4 mg, 0.4 mg, Intravenous, Q2H PRN **OR** HYDROmorphone (Dilaudid) 1 MG/ML injection 0.8 mg, 0.8 mg, Intravenous, Q2H PRN    pancrelipase (Lip-Prot-Amyl) (Zenpep) DR particles cap 50,000 Units, 50,000 Units, Oral, TID CC    heparin (Porcine) 100 Units/mL lock flush 500 Units, 5 mL, Intravenous, PRN    Intake/Output:    Intake/Output Summary (Last 24 hours) at 3/7/2024 1639  Last data filed at 3/7/2024 0550  Gross per 24 hour   Intake 1100 ml   Output 175 ml   Net 925 ml       Body mass index is 23.32 kg/m².    Review of Systems  Review of Systems:   A comprehensive 10 point review of systems was completed.  Pertinent positives and negatives noted in the the HPI.         Patient Vitals for the past 24 hrs:   BP Temp Temp src Pulse Resp SpO2 Height Weight   03/07/24 1051 145/74 97.3 °F (36.3 °C) Oral 53 18 95 % -- --   03/07/24 0725 132/72 97.5 °F (36.4 °C) Oral 51 19 95 % -- --   03/07/24 0550 133/69 -- -- 53 -- -- -- --   03/07/24 0333 135/69 97.9 °F (36.6 °C) Oral 59 18 95 % -- --   03/07/24 0110 129/73 97.4 °F (36.3 °C) Oral 56 20 97 % -- 181 lb 10.5 oz (82.4 kg)   03/07/24 0015 153/76 -- -- 51 16 98 % -- --   03/06/24 2345 -- -- -- 53 16 97 % -- --   03/06/24 2252 119/54 -- -- 58 14 96 % -- --   03/06/24 2129 -- -- -- 58 16 97 % -- --   03/06/24 2045 -- 98 °F (36.7 °C) Oral -- -- -- -- --   03/06/24 2002 134/76 -- -- 56 16 99 % -- --   03/06/24 1901 130/77 97.5 °F (36.4 °C) Temporal 64 22 97 % 6' 2\" (1.88 m) 175 lb (79.4 kg)     Vitals:    03/07/24 0333 03/07/24 0550 03/07/24 0725 03/07/24 1051   BP: 135/69 133/69 132/72 145/74   BP Location: Right arm Right arm Right arm    Pulse: 59 53 51 53   Resp: 18  19 18   Temp: 97.9 °F (36.6 °C)  97.5 °F (36.4 °C) 97.3 °F (36.3 °C)   TempSrc: Oral  Oral Oral   SpO2: 95%  95% 95%   Weight:       Height:             Physical Exam  Constitutional:       General: He is not in acute distress.     Appearance: Normal appearance. He is not ill-appearing or diaphoretic.   HENT:      Head: Normocephalic and atraumatic.      Nose: Nose normal. No congestion or rhinorrhea.      Mouth/Throat:      Mouth: Mucous membranes are moist.      Pharynx: Oropharynx is clear. No oropharyngeal exudate or posterior oropharyngeal erythema.   Eyes:      Extraocular Movements: Extraocular movements intact.      Pupils: Pupils are equal, round, and reactive to light.   Cardiovascular:      Rate and Rhythm: Normal rate.      Pulses: Normal pulses.      Heart sounds:  Normal heart sounds. No murmur heard.  Pulmonary:      Effort: Pulmonary effort is normal. No respiratory distress.      Breath sounds: Normal breath sounds. No wheezing or rhonchi.      Comments: Diminished breath sounds in bilateral lower lung zones  Chest:      Chest wall: No tenderness.   Abdominal:      General: Abdomen is flat. Bowel sounds are normal.      Palpations: Abdomen is soft.   Musculoskeletal:         General: Normal range of motion.   Skin:     General: Skin is warm.   Neurological:      General: No focal deficit present.      Mental Status: He is alert and oriented to person, place, and time.   Psychiatric:         Mood and Affect: Mood normal.         Behavior: Behavior normal.         Thought Content: Thought content normal.         Judgment: Judgment normal.            Lab Data Review:  Recent Labs   Lab 03/06/24 2023 03/07/24  0549   WBC 6.1 5.2   HGB 10.0* 9.7*   HCT 30.7* 30.9*   .0* 128.0*       Recent Labs   Lab 03/06/24 2023 03/07/24  0549   * 136   K 3.9 3.9    104   CO2 29.0 30.0   BUN 15 13   CREATSERUM 0.84 0.62*   CA 8.3* 8.6   ALB 2.6* 2.7*   ALKPHO 101 94   ALT 24 23   AST 19 16   * 89       No results for input(s): \"MG\" in the last 168 hours.    Lab Results   Component Value Date    PHOS 3.5 11/27/2023        No results for input(s): \"PT\", \"INR\", \"PTT\" in the last 168 hours.    No results for input(s): \"ABGPHT\", \"RPNPUK5H\", \"AFZYO2I\", \"ABGHCO3\", \"ABGBE\", \"TEMP\", \"TACOS\", \"SITE\", \"DEV\", \"THGB\" in the last 168 hours.    No results for input(s): \"TROP\", \"CKMB\" in the last 168 hours.    Cultures: No results found for this visit on 03/06/24.        Radiology personally reviewed:  XR CHEST AP PORTABLE  (CPT=71045)    Result Date: 3/6/2024  CONCLUSION:   Stable cardiac and mediastinal contours.  Reticular nodular opacities of the mid/lower lungs bilaterally are seen to better advantage on recent CT of 03/06/2024.  No associated pleural effusion or  pneumothorax.  Left IJ chest port terminates near the cavoatrial junction.   LOCATION:  [facility]      Dictated by (CST): Richie Jay MD on 3/06/2024 at 10:10 PM     Finalized by (CST): Richie Jay MD on 3/06/2024 at 10:11 PM       CT CHEST+ABDOMEN (ALL CNTRST ONLY) (CPT=71260/78068)    Result Date: 3/6/2024  CONCLUSION:   1. Extensive patchy consolidative and tree-in-bud type opacities throughout the lungs with some areas improved and others worse compared to 01/29/2024.  Findings may reflect aspiration pneumonia or other inflammatory process.  Given the solid nodular components, metastatic disease cannot be excluded.  Recommend continued attention on follow-up.  2. Hypoattenuating lesions of hepatic segment 8 correspond to areas of FDG avidity on PET-CT of 01/04/2024 most consistent with metastases, generally stable in size in the interim.  3. No definite CT correlate for focus of peripancreatic hypermetabolism noted on PET-CT of 01/04/2024.  No new sites of disease identified within the abdomen.    LOCATION:  [facility]   Dictated by (CST): Richie Jay MD on 3/06/2024 at 5:14 PM     Finalized by (CST): Richie Jay MD on 3/06/2024 at 5:30 PM         Patient Active Problem List   Diagnosis    Primary hypertension    Hyperlipidemia with target low density lipoprotein (LDL) cholesterol less than 70 mg/dL    Coronary artery disease involving native coronary artery of native heart without angina pectoris    S/P CABG x 4    Nontoxic multinodular goiter    Pulmonary nodules    Thrombophlebitis leg    BRANDAN (generalized anxiety disorder)    Right shoulder Arthroscopy acromioplasty ,distal  clavicle resection, rotator cuff/labral debridment  Global 05/13/2021    Right bicipital tenosynovitis    Malignant neoplasm of esophagus (HCC)    Esophageal anastomotic leak    Esophageal obstruction    On total parenteral nutrition (TPN)    Mechanical complication of esophagostomy (HCC)    Abdominal pain of unknown etiology    Migration of  esophageal stent    Gastroparesis    Esophageal carcinoma (HCC)    Normocytic anemia    Esophageal fistula    Subacute cough    Acquired bronchoesophageal fistula (HCC)    Pneumonia of both lower lobes due to infectious organism    Malignant neoplasm of pancreas (HCC)    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 difficile colitis    Dysphagia, unspecified type    Shortness of breath    Hypokalemia    Dysphagia    Narcotic drug use    History of esophageal cancer    Palliative care by specialist    Neoplasm related pain    Endobronchial mass    Hyponatremia    Thrombocytopenia (HCC)    Acute kidney injury (HCC)    Hyperglycemia    Aspiration pneumonitis (HCC)    C. 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  Bilateral pulmonary nodules with tree-in-bud appearance suspect related to aspiration episodes differential diagnosis would include aspiration patchy pneumonia versus atypical mycobacterial or fungal infections versus metastasis although not typical.morphology on CT chest   Metastatic esophageal adenocarcinoma status post gastroesophagectomy on September 2022  History of pancreatic cancer  Hypertension  Hyperlipidemia  Coronary artery disease status post CABG x 4  Nontoxic multinodular goiter  Anemia  Thrombocytopenia      Plan:  Infectious disease consult  We may consider bronchoscopy with lung biopsy if strongly recommended by  Infectious disease  DuoNebs every 6 hour as needed to help clear secretions  Antibiotics per infectious disease  Follow labs,  procalcitonin,      Thank You for allowing me to participate in this patient's care     Dusty Brooks MD

## 2024-03-07 NOTE — H&P
[facility]     Dontae Cortez Jr. Patient Status:  Emergency    10/15/1970 MRN [id number]   Location [facility] EMERGENCY DEPARTMENT Attending No att. providers found   Hosp Day # 0 PCP Adrian Horowitz MD     Chief Complaint: Dyspnea    Subjective:    History of Present Illness:     Dontae Cortez Jr. is a 53 year old male with past medical history of CAD, pancreatic cancer, esophageal cancer, hyperlipidemia, hypertension who presents ED for dyspnea.  Patient has had productive cough with purulent sputum and has been dyspneic for the past few days.  He has had chills but no fever.  He had chemo on Monday.  He was sent to outside ER a week ago and diagnosed with aspiration pneumonia.  He was treated with Augmentin and doxycycline but he reports no improvement afterwards.  He has history of C. difficile and often gets diarrhea frequently.  He had CT of abdomen done per GI doctor today who told him he had pneumonia and needed to be admitted.    History/Other:    Past Medical History:  Past Medical History:   Diagnosis Date    Back problem     Belching 2022    Black stools 2022    Borderline diabetes     Dx in 2013 - HgA1C 6.2%    C. difficile diarrhea 10/23/2022    pt treated and without symptoms    Chest pain 2022    Coronary artery disease     On 13: CABG x 4 with LIMA to LAD and SVG to diagonal, OM and PDA    Decorative tattoo 2000    Depression     Difficult intubation     h/o esophagectomy for CA; developed esophagobronchial vistula    Disorder of liver     LIVER CA    Esophageal cancer (HCC)     completed chemo    Esophageal reflux     Essential hypertension     Exposure to medical diagnostic radiation     last tx 2022    Frequent urination 2013    Gastroparesis     Heartburn 2022    High blood pressure     High cholesterol 2013    Found when I had quadruple bypass    History of COVID-19 10/16/2022     asymptomatic - pt was dx during a hospitalization for another diagnosis. No continued symptoms    History of stomach ulcers     Hyperlipidemia     Hyperlipidemia LDL goal < 70     Indigestion 02/01/2022    Morbid obesity with BMI of 40.0-44.9, adult (HCC)     Muscle weakness     Nontoxic multinodular goiter     Dx in 8/2013: pt was told that imaging showed thyroid cysts per PCP    Pancreatic cancer (HCC)     last dose 12/4/2023 is scheduled for another round 12/27/23    Peripheral vascular disease (HCC)     pt denies    Personal history of antineoplastic chemotherapy     for esophageal cancer/completed    Personal history of antineoplastic chemotherapy 12/2023    pancreatic cancer    Problems with swallowing 02/01/2022    Pulmonary nodules     Dx in 8/2013: CT chest showed small bilateral fissural-based lung nodules less than 1 cm    S/P CABG x 4     On 8/16/13: CABG x 4 with LIMA to LAD and SVG to diagonal, OM and PDA    Shortness of breath     when coughing; no oxygen    Vomiting 02/01/2022     Past Surgical History:   Past Surgical History:   Procedure Laterality Date    APPENDECTOMY      APPENDECTOMY      ARTHROSCOPY OF JOINT UNLISTED      right shoulder    CABG      On 8/16/13: CABG x 4 with LIMA to LAD and SVG to diagonal, OM and PDA    CARDIAC CATH LAB      On 8/14/2013: cardiac cath showed 3-vessel disease    OTHER SURGICAL HISTORY      1.       Laparoscopic robotic-assisted esophagogastrectomy.    PORT, INDWELLING, IMP        Family History:   Family History   Problem Relation Age of Onset    Cancer Mother         breast and colon     Diabetes Neg      Social History:    reports that he quit smoking about 10 years ago. His smoking use included cigarettes. He has a 27 pack-year smoking history. He has never used smokeless tobacco. He reports that he does not currently use alcohol. He reports that he does not use drugs.     Allergies: No Known Allergies    Medications:    Current Facility-Administered  Medications on File Prior to Encounter   Medication Dose Route Frequency Provider Last Rate Last Admin    [COMPLETED] iopamidol 76% (ISOVUE-370) injection for power injector  80 mL Intravenous ONCE PRN Raheel Ritchie MD   80 mL at 03/06/24 1650    [COMPLETED] heparin (Porcine) 100 Units/mL lock flush 500 Units  5 mL Intravenous Once Raheel Ritchie MD   500 Units at 03/06/24 1655    [COMPLETED] ondansetron (Zofran) 16 mg, dexAMETHasone (Decadron) 20 mg in sodium chloride 0.9% 110 mL IVPB   Intravenous Once Katie Conner APRN   Stopped at 02/22/24 1204    [COMPLETED] nivolumab (Opdivo) 240 mg in sodium chloride 0.9% 124 mL IVPB  240 mg Intravenous Once Katie Conner APRN   Stopped at 02/22/24 1103    [COMPLETED] trastuzumab-qyyp (Trazimera) 336 mg in sodium chloride 0.9% 266 mL infusion  4 mg/kg (Treatment Plan Recorded) Intravenous Once Katie Conner APRN   Stopped at 02/22/24 1144    [COMPLETED] oxaliplatin (Eloxatin) 155 mg in dextrose 5% 281 mL infusion  75 mg/m2 (Treatment Plan Recorded) Intravenous Once Katie Conner APRN   Stopped at 02/22/24 1415    [COMPLETED] leucovorin 850 mg in dextrose 5% 250 mL infusion  400 mg/m2 (Treatment Plan Recorded) Intravenous Once Katie Conner APRN   Stopped at 02/22/24 1415    [COMPLETED] ondansetron (Zofran) 16 mg, dexAMETHasone (Decadron) 20 mg in sodium chloride 0.9% 110 mL IVPB   Intravenous Once Senia Foster MD   Stopped at 02/05/24 1141    [COMPLETED] trastuzumab-qyyp (Trazimera) 336 mg in sodium chloride 0.9% 266 mL infusion  4 mg/kg (Treatment Plan Recorded) Intravenous Once Senia Foster MD   Stopped at 02/05/24 1217    [COMPLETED] oxaliplatin (Eloxatin) 155 mg in dextrose 5% 281 mL infusion  75 mg/m2 (Treatment Plan Recorded) Intravenous Once Senia Foster MD   Stopped at 02/05/24 1432    [COMPLETED] leucovorin 850 mg in dextrose 5% 250 mL infusion  400 mg/m2 (Treatment Plan Recorded) Intravenous Once Senia Foster MD    Stopped at 02/05/24 1432    [COMPLETED] morphINE PF 4 MG/ML injection 4 mg  4 mg Intravenous Once Jose E Chung MD   4 mg at 01/29/24 0447    [COMPLETED] ondansetron (Zofran) 4 MG/2ML injection 4 mg  4 mg Intravenous Once Jose E Chung MD   4 mg at 01/29/24 0447    [COMPLETED] morphINE PF 4 MG/ML injection 4 mg  4 mg Intravenous Once Jose E Chung MD   4 mg at 01/29/24 0647    [COMPLETED] iopamidol 76% (ISOVUE-370) injection for power injector  75 mL Intravenous ONCE PRN Jose Roberto Myles DO   75 mL at 01/29/24 1148    [COMPLETED] ceFAZolin (Ancef) 2 g in 20mL IV syringe premix  2 g Intravenous On Call Vasquez Alexis MD   1 g at 01/25/24 1250    [COMPLETED] mupirocin (Bactroban) 2% nasal ointment             [COMPLETED] acetaminophen (Tylenol Extra Strength) tab 1,000 mg  1,000 mg Oral Once PRN Fátima Gallagher MD        Or    [COMPLETED] HYDROcodone-acetaminophen (Norco) 5-325 MG per tab 1 tablet  1 tablet Oral Once PRN Fátima Gallagher MD   1 tablet at 01/25/24 1547    Or    [COMPLETED] HYDROcodone-acetaminophen (Norco) 5-325 MG per tab 2 tablet  2 tablet Oral Once PRN Fátima Gallagher MD        [COMPLETED] prochlorperazine (Compazine) 10 MG/2ML injection             [COMPLETED] heparin (Porcine) 100 Units/mL lock flush 500 Units  5 mL Intracatheter Once Vasquez Alexis MD   500 Units at 01/25/24 1605    [COMPLETED] pembrolizumab (Keytruda) 400 mg in sodium chloride 0.9% 116 mL IVPB  400 mg Intravenous Once Grecia Angelo APRN   Stopped at 01/22/24 1016    [COMPLETED] trastuzumab-qyyp (Trazimera) 336 mg in sodium chloride 0.9% 266 mL infusion  4 mg/kg (Treatment Plan Recorded) Intravenous Once Grecia Angelo APRN   Stopped at 01/22/24 1054    [COMPLETED] ondansetron (Zofran) 4 MG/2ML injection             [COMPLETED] HYDROmorphone (Dilaudid) 1 MG/ML injection             [COMPLETED] heparin (Porcine) 100 Units/mL lock flush 500 Units  5 mL Intracatheter Once Chalres Maguire MD   500 Units  at 24 1104    [COMPLETED] HYDROmorphone (Dilaudid) 1 MG/ML injection             [] naloxone (Narcan) 0.4 MG/ML injection 0.08 mg  0.08 mg Intravenous Once PRN Junaid Evnagelista DO        [COMPLETED] potassium chloride (K-Dur) tab 40 mEq  40 mEq Oral Once Jan Brito MD   40 mEq at 24 0833    [COMPLETED] morphINE PF 4 MG/ML injection 4 mg  4 mg Intravenous Once Jose E Chung MD   4 mg at 24    [] sodium chloride 0.9% infusion   Intravenous Continuous Jose E Chung MD   New Bag at 24 1002    [] morphINE PF 4 MG/ML injection 4 mg  4 mg Intravenous Q30 Min PRN Jose E Chnug MD        [] ondansetron (Zofran) 4 MG/2ML injection 4 mg  4 mg Intravenous Q4H PRN Jose E Chung MD        [COMPLETED] potassium chloride 40 mEq in 250mL sodium chloride 0.9% IVPB premix  40 mEq Intravenous Once Jose E Chung MD 62.5 mL/hr at 248 40 mEq at 24    [COMPLETED] morphINE PF 4 MG/ML injection 4 mg  4 mg Intravenous Once Jose E Chung MD   4 mg at 24    [COMPLETED] HYDROcodone-acetaminophen (Norco) 5-325 MG per tab 1 tablet  1 tablet Oral Once Jose E Chung MD   1 tablet at 24 2253    [COMPLETED] heparin (Porcine) 100 Units/mL lock flush 500 Units  5 mL Intracatheter Once Senia Foster MD   500 Units at 24 1123    [COMPLETED] potassium chloride 40 mEq in 250mL sodium chloride 0.9% IVPB premix  40 mEq Intravenous Once Megan Arshad MD 62.5 mL/hr at 24 1436 40 mEq at 24 1436    [COMPLETED] iopamidol 76% (ISOVUE-370) injection for power injector  80 mL Intravenous ONCE PRN Sophia Gibson MD   80 mL at 23 0248    [COMPLETED] potassium chloride 40 mEq in 250mL sodium chloride 0.9% IVPB premix  40 mEq Intravenous Once Jose Roberto Myles,  62.5 mL/hr at 23 1328 40 mEq at 23 1328    [COMPLETED] ipratropium-albuterol (Duoneb) 0.5-2.5 (3) MG/3ML inhalation solution 3 mL  3 mL Nebulization Once  Konrad Cuba MD   3 mL at 23 1617    [COMPLETED] ketorolac (Toradol) 15 MG/ML injection 15 mg  15 mg Intravenous Once Konrad Cuba MD   15 mg at 23 1649    [] ondansetron (Zofran) 4 MG/2ML injection 4 mg  4 mg Intravenous Q4H PRN Konrad Cuba MD        [COMPLETED] piperacillin-tazobactam (Zosyn) 3.375 g in dextrose 5% 100 mL IVPB-ADDV  3.375 g Intravenous Once Konrad Cuba MD   Stopped at 23 175    [COMPLETED] ketorolac (Toradol) 15 MG/ML injection 15 mg  15 mg Intravenous Once Konrad Cuba MD   15 mg at 23 181    [COMPLETED] morphINE PF 4 MG/ML injection 4 mg  4 mg Intravenous Once Konrad Cuba MD   4 mg at 23 195    [COMPLETED] morphINE PF 4 MG/ML injection 4 mg  4 mg Intravenous Once Konrad Cuba MD   4 mg at 23 2153    [COMPLETED] trastuzumab-qyyp (Trazimera) 504 mg in sodium chloride 0.9% 274 mL infusion  6 mg/kg (Treatment Plan Recorded) Intravenous Once Nataly Siddiqui APRN   Stopped at 23 0930    [COMPLETED] sodium chloride 0.9 % IV bolus 500 mL  500 mL Intravenous Once Freddy Hernandez MD   Stopped at 23    [COMPLETED] ipratropium-albuterol (Duoneb) 0.5-2.5 (3) MG/3ML inhalation solution 3 mL  3 mL Nebulization Once Freddy Hernandez MD   3 mL at 23    [COMPLETED] HYDROmorphone (Dilaudid) 1 MG/ML injection 0.5 mg  0.5 mg Intravenous Q30 Min PRN Freddy Hernandez MD   0.5 mg at 23 223    [COMPLETED] iohexol (Omnipaque) 350 MG/ML injection via power injector 100 mL  100 mL Intravenous ONCE PRN Freddy Hernandez MD   100 mL at 12/16/23 2142    [COMPLETED] morphINE PF 4 MG/ML injection 4 mg  4 mg Intravenous Q30 Min PRN Wero Chiang MD   4 mg at 23 0813    [COMPLETED] magnesium sulfate in sterile water for injection 2 g/50mL IVPB premix 2 g  2 g Intravenous Once Megan Arshad MD 50 mL/hr at 23 1413 2 g at 23 1413     [] sodium chloride 0.9% infusion   Intravenous Continuous Nikia Fairchild MD        [] HYDROmorphone (Dilaudid) 1 MG/ML injection 1 mg  1 mg Intravenous Q30 Min PRN Nikia Fairchild MD        [] ondansetron (Zofran) 4 MG/2ML injection 4 mg  4 mg Intravenous Q4H PRN Nikia Fairchild MD        [COMPLETED] iopamidol 76% (ISOVUE-370) injection for power injector  100 mL Intravenous ONCE PRN Nikia Fairchild MD   100 mL at 12/10/23 2109    [COMPLETED] hydrALAzine (Apresoline) 20 mg/mL injection 10 mg  10 mg Intravenous Once Nikia Fairchild MD   10 mg at 12/10/23 2231     Current Outpatient Medications on File Prior to Encounter   Medication Sig Dispense Refill    metoclopramide (REGLAN) 10 MG Oral Tab Take 1 tablet (10 mg total) by mouth 4 (four) times daily before meals and nightly. 120 tablet 2    baclofen 10 MG Oral Tab Take 1 tablet (10 mg total) by mouth 3 (three) times daily as needed. 270 tablet 0    ondansetron (ZOFRAN) 8 MG tablet Take 1 tablet (8 mg total) by mouth every 8 (eight) hours as needed for Nausea. 30 tablet 3    HYDROcodone-acetaminophen 5-325 MG Oral Tab Take 1 tablet by mouth every 4 (four) hours as needed for Pain. 60 tablet 0    Pancrelipase, Lip-Prot-Amyl, (CREON) 22859-01248 units Oral Cap DR Particles Take 2 capsules with meals and 1 capsule with snacks 240 capsule 0    gabapentin 300 MG Oral Cap Take 1 capsule (300 mg total) by mouth in the morning and 1 capsule (300 mg total) before bedtime. 180 capsule 0    guaiFENesin-codeine 100-10 MG/5ML Oral Solution Take 5 mL by mouth every 6 (six) hours as needed for cough. 237 mL 1    morphINE ER 15 MG Oral Tab CR Take 1 tablet (15 mg total) by mouth every 12 (twelve) hours. 60 tablet 0    benzonatate 100 MG Oral Cap Take 1 capsule (100 mg total) by mouth 3 (three) times daily as needed for cough. 90 capsule 0    sertraline 100 MG Oral Tab Take 2 tablets (200 mg total) by mouth daily.      [] sucralfate (CARAFATE) 1 g Oral Tab  Take 1 tablet (1 g total) by mouth 3 (three) times daily as needed. 60 tablet 3    losartan 50 MG Oral Tab Take 1 tablet (50 mg total) by mouth daily. 90 tablet 2    Omeprazole 40 MG Oral Capsule Delayed Release Take 1 capsule (40 mg total) by mouth 2 (two) times daily before meals. 90 capsule 3    metoprolol succinate ER 50 MG Oral Tablet 24 Hr TAKE 1 TABLET BY MOUTH EVERY DAY 90 tablet 1       Review of Systems:   A comprehensive review of systems was completed.    Pertinent positives and negatives noted in the HPI.    Objective:   Physical Exam:    /76   Pulse 58   Temp 98 °F (36.7 °C) (Oral)   Resp 16   Ht 6' 2\" (1.88 m)   Wt 175 lb (79.4 kg)   SpO2 97%   BMI 22.47 kg/m²   General: No acute distress, Alert  Respiratory: No rhonchi, no wheezes  Cardiovascular: S1, S2. Regular rate and rhythm  Abdomen: Soft, Non-tender, non-distended, positive bowel sounds  Neuro: No new focal deficits  Extremities: No edema    Results:    Labs:      Labs Last 24 Hours:    Recent Labs   Lab 03/06/24 2023   RBC 3.46*   HGB 10.0*   HCT 30.7*   MCV 88.7   MCH 28.9   MCHC 32.6   RDW 14.3   NEPRELIM 4.83   WBC 6.1   .0*       Recent Labs   Lab 03/06/24 2023   *   BUN 15   CREATSERUM 0.84   EGFRCR 104   CA 8.3*   ALB 2.6*   *   K 3.9      CO2 29.0   ALKPHO 101   AST 19   ALT 24   BILT 0.2   TP 6.5       Lab Results   Component Value Date    PT 20.4 (H) 01/30/2014    PT 22.9 (H) 01/13/2014    PT 25.7 (H) 01/08/2014    INR 1.26 (H) 01/25/2024    INR 1.20 11/27/2023    INR 1.08 01/26/2023       No results for input(s): \"TROP\", \"TROPHS\", \"CK\" in the last 168 hours.    No results for input(s): \"TROP\", \"PBNP\" in the last 168 hours.    Recent Labs   Lab 03/06/24 2023   PCT 0.09       Imaging: Imaging data reviewed in Epic.    Assessment & Plan:      #Aspiration pneumonia  -Chest x-ray reviewed-CT chest abdomen reviewed  -Chest x-ray reviewed  -Blood cultures  ordered  -Sputum culture ordered  -Urine  antigen ordered  -IV antibiotics    #History of C. difficile  -C. difficile PCR ordered  -GI stool PCR ordered  -Oral vancomycin     #Hypertension  -Losartan, metoprolol    #CAD s/p CABG    #Hyperlipidemia    #Metastatic esophageal cancer s/p resection and gastroesophagostomy    #Metastatic pancreatic cancer    Plan of care discussed with patient    Clark Myles DO    Supplementary Documentation:     The 21st Century Cures Act makes medical notes like these available to patients in the interest of transparency. Please be advised this is a medical document. Medical documents are intended to carry relevant information, facts as evident, and the clinical opinion of the practitioner. The medical note is intended as peer to peer communication and may appear blunt or direct. It is written in medical language and may contain abbreviations or verbiage that are unfamiliar.

## 2024-03-08 LAB
C DIFF TOX B STL QL: NEGATIVE
DEPRECATED HBV CORE AB SER IA-ACNC: 153.4 NG/ML
IRON SATN MFR SERPL: 24 %
IRON SERPL-MCNC: 64 UG/DL
TIBC SERPL-MCNC: 271 UG/DL (ref 240–450)
TRANSFERRIN SERPL-MCNC: 182 MG/DL (ref 200–360)

## 2024-03-08 PROCEDURE — 99232 SBSQ HOSP IP/OBS MODERATE 35: CPT | Performed by: INTERNAL MEDICINE

## 2024-03-08 PROCEDURE — 99255 IP/OBS CONSLTJ NEW/EST HI 80: CPT | Performed by: INTERNAL MEDICINE

## 2024-03-08 PROCEDURE — 99233 SBSQ HOSP IP/OBS HIGH 50: CPT | Performed by: INTERNAL MEDICINE

## 2024-03-08 RX ORDER — METOCLOPRAMIDE 5 MG/1
10 TABLET ORAL
Status: DISCONTINUED | OUTPATIENT
Start: 2024-03-08 | End: 2024-03-09

## 2024-03-08 NOTE — CONSULTS
[facility]                       Gastroenterology Consultation-[facility]    Dontae Buddy Cortez Jr. Patient Status:  Inpatient    10/15/1970 MRN [id number]   Location [facility] 4NW-A Attending Sakina Hernandez MD   Hosp Day # 1 PCP Adrian Horowitz MD     Reason for consultation: Abdominal pain   HPI: Mr. Diego Champion Is a 54 y/o with a PMHx of that includes CAD s/p CABG, HTN, dyslipidemia, and distal esophageal adenocarcinoma (Dx 2022) s/p chemo RT followed by laparoscopic robotic assisted esophagogastrectomy with J-tube placement (2022; Dr. Lu) recovery C/B anastomotic leak s/p endoscopic closure with stenting (Dr. Ritchie) with course further C/B GOO s/p EGD with esophageal stenting plus Botox injections (2022; Dr. Ritchie).  Patient underwent EUS for pancreatic head mass (2023; Dr. Ritchie) with biopsy revealing moderate to poorly differentiated carcinoma--unclear if metastatic esophageal to pancreas versus secondary pancreatic primary.  He presents to the hospital for this admission on 3/6/24 for abnormal CT that revealed extensive patchy consolidative and tree -in-bud type opacities involving dependent portions of the upper/lower lobes and lingula. GI consulted for abdominal pain, that has been ongoing since 2023 along with reports of dysphagia and odynophagia. CT c/a/p on 3/6 also notes hypo attenuating lesions corresponds to areas of FDG avidity on PET-CT of 24 most consistent with metastasis, no new sites of disease identified within abdomen.  He has had multiple endoscopic evaluations with Dr. Yadav and Dr. Ritchie since 2022 up until most recent EGD/bronchoscopy on 24.   PMHx:   Past Medical History:   Diagnosis Date    Back problem     Belching 2022    Black stools 2022    Borderline diabetes     Dx in 2013 - HgA1C 6.2%    C. difficile diarrhea 10/23/2022    pt treated and without symptoms    Chest pain 2022    Coronary  artery disease     On 8/16/13: CABG x 4 with LIMA to LAD and SVG to diagonal, OM and PDA    Decorative tattoo 01/01/2000    Depression     Difficult intubation     h/o esophagectomy for CA; developed esophagobronchial vistula    Disorder of liver     LIVER CA    Esophageal cancer (HCC)     completed chemo    Esophageal reflux     Essential hypertension     Exposure to medical diagnostic radiation     last tx 8/18/2022    Frequent urination 01/01/2013    Gastroparesis     Heartburn 02/01/2022    High blood pressure     High cholesterol 08/01/2013    Found when I had quadruple bypass    History of COVID-19 10/16/2022    asymptomatic - pt was dx during a hospitalization for another diagnosis. No continued symptoms    History of stomach ulcers     Hyperlipidemia     Hyperlipidemia LDL goal < 70     Indigestion 02/01/2022    Morbid obesity with BMI of 40.0-44.9, adult (HCC)     Muscle weakness     Nontoxic multinodular goiter     Dx in 8/2013: pt was told that imaging showed thyroid cysts per PCP    Pancreatic cancer (HCC)     last dose 12/4/2023 is scheduled for another round 12/27/23    Peripheral vascular disease (HCC)     pt denies    Personal history of antineoplastic chemotherapy     for esophageal cancer/completed    Personal history of antineoplastic chemotherapy 12/2023    pancreatic cancer    Problems with swallowing 02/01/2022    Pulmonary nodules     Dx in 8/2013: CT chest showed small bilateral fissural-based lung nodules less than 1 cm    S/P CABG x 4     On 8/16/13: CABG x 4 with LIMA to LAD and SVG to diagonal, OM and PDA    Shortness of breath     when coughing; no oxygen    Vomiting 02/01/2022                PSHx:   Past Surgical History:   Procedure Laterality Date    APPENDECTOMY      APPENDECTOMY      ARTHROSCOPY OF JOINT UNLISTED      right shoulder    CABG      On 8/16/13: CABG x 4 with LIMA to LAD and SVG to diagonal, OM and PDA    CARDIAC CATH LAB      On 8/14/2013: cardiac cath showed 3-vessel  disease    OTHER SURGICAL HISTORY      1.       Laparoscopic robotic-assisted esophagogastrectomy.    PORT, INDWELLING, IMP       Medications:    [Held by provider] metoclopramide (Reglan) tab 10 mg  10 mg Oral TID AC and HS    piperacillin-tazobactam (Zosyn) 3.375 g in dextrose 5% 100 mL IVPB-ADDV  3.375 g Intravenous Q8H    vancomycin (Vancocin) cap 125 mg  125 mg Oral Daily    gabapentin (Neurontin) cap 300 mg  300 mg Oral BID    losartan (Cozaar) tab 50 mg  50 mg Oral Daily    metoprolol succinate ER (Toprol XL) 24 hr tab 50 mg  50 mg Oral Daily Beta Blocker    pantoprazole (Protonix) DR tab 40 mg  40 mg Oral QAM AC    sertraline (Zoloft) tab 200 mg  200 mg Oral Daily    acetaminophen (Tylenol Extra Strength) tab 500 mg  500 mg Oral Q4H PRN    melatonin tab 3 mg  3 mg Oral Nightly PRN    guaiFENesin ER (Mucinex) 12 hr tab 600 mg  600 mg Oral BID PRN    benzonatate (Tessalon) cap 200 mg  200 mg Oral TID PRN    glycerin-hypromellose- (Artifical Tears) 0.2-0.2-1 % ophthalmic solution 1 drop  1 drop Both Eyes QID PRN    sodium chloride (Saline Mist) 0.65 % nasal solution 1 spray  1 spray Each Nare Q3H PRN    enoxaparin (Lovenox) 40 MG/0.4ML SUBQ injection 40 mg  40 mg Subcutaneous Daily    ondansetron (Zofran) 4 MG/2ML injection 4 mg  4 mg Intravenous Q6H PRN    prochlorperazine (Compazine) 10 MG/2ML injection 5 mg  5 mg Intravenous Q8H PRN    polyethylene glycol (PEG 3350) (Miralax) 17 g oral packet 17 g  17 g Oral Daily PRN    sennosides (Senokot) tab 17.2 mg  17.2 mg Oral Nightly PRN    bisacodyl (Dulcolax) 10 MG rectal suppository 10 mg  10 mg Rectal Daily PRN    fleet enema (Fleet) 7-19 GM/118ML rectal enema 133 mL  1 enema Rectal Once PRN    HYDROmorphone (Dilaudid) 1 MG/ML injection 0.2 mg  0.2 mg Intravenous Q2H PRN    Or    HYDROmorphone (Dilaudid) 1 MG/ML injection 0.4 mg  0.4 mg Intravenous Q2H PRN    Or    HYDROmorphone (Dilaudid) 1 MG/ML injection 0.8 mg  0.8 mg Intravenous Q2H PRN     pancrelipase (Lip-Prot-Amyl) (Zenpep) DR particles cap 50,000 Units  50,000 Units Oral TID CC    heparin (Porcine) 100 Units/mL lock flush 500 Units  5 mL Intravenous PRN    diphenhydrAMINE (Benadryl) 50 mg/mL  injection 12.5 mg  12.5 mg Intravenous Nightly PRN    Or    diphenhydrAMINE (Benadryl) cap/tab 25 mg  25 mg Oral Nightly PRN    [COMPLETED] iopamidol 76% (ISOVUE-370) injection for power injector  80 mL Intravenous ONCE PRN    [COMPLETED] heparin (Porcine) 100 Units/mL lock flush 500 Units  5 mL Intravenous Once    [COMPLETED] HYDROmorphone (Dilaudid) 1 MG/ML injection 0.5 mg  0.5 mg Intravenous Q30 Min PRN    [COMPLETED] ondansetron (Zofran) 4 MG/2ML injection 4 mg  4 mg Intravenous Once    [COMPLETED] sodium chloride 0.9 % IV bolus 1,000 mL  1,000 mL Intravenous Once    [COMPLETED] piperacillin-tazobactam (Zosyn) 4.5 g in dextrose 5% 100 mL IVPB-ADDV  4.5 g Intravenous Once     Allergies: No Known Allergies  Social HX:   Social History     Socioeconomic History    Marital status:     Number of children: 3   Occupational History    Occupation: works as  - on workman's comp   Tobacco Use    Smoking status: Former     Packs/day: 1.00     Years: 27.00     Additional pack years: 0.00     Total pack years: 27.00     Types: Cigarettes     Quit date: 8/15/2013     Years since quitting: 10.5    Smokeless tobacco: Never    Tobacco comments:     Quit smoking 2013   Vaping Use    Vaping Use: Never used   Substance and Sexual Activity    Alcohol use: Not Currently    Drug use: Never    Sexual activity: Not Currently     Partners: Female   Other Topics Concern    Caffeine Concern Yes    Stress Concern No    Weight Concern No    Special Diet No    Exercise No    Seat Belt No   Social History Narrative    Lives with life partner    Has 3 daughters - 1 lives closeby, 1 in [place], 1 in [place]     Social Determinants of Health     Financial Resource Strain: Low Risk  (12/14/2023)    Financial Resource Strain      Difficulty of Paying Living Expenses: Not very hard     Med Affordability: No   Food Insecurity: No Food Insecurity (3/7/2024)    Food Insecurity     Food Insecurity: Never true   Transportation Needs: No Transportation Needs (3/7/2024)    Transportation Needs     Lack of Transportation: No   Housing Stability: Low Risk  (3/7/2024)    Housing Stability     Housing Instability: No     Housing Instability Emergency: No      FamHx: The patient has no family history of colon cancer or other gastrointestinal malignancies;  No family history of ulcer disease, or inflammatory bowel disease  ROS:  In addition to the pertinent positives described above:            Infectious Disease:  No chronic infections or recent fevers prior to the acute illness            Cardiovascular: No history of CAD, prior MI, chest pain, or palpitations            Respiratory: No shortness of breath, asthma, copd, recurrent pneumonia            Hematologic: The patient reports no easy bruising, frequent gum bleeding or nose bleeding;  The patient has no history of known chronic anemia            Dermatologic: The patient reports no recent rashes or chronic skin disorders            Rheumatologic: The patient reports no history of chronic arthritis, myalgias, arthralgias            Genitourinary:  The patient reports no history of recurrent urinary tract infections, hematuria, dysuria, or nephrolithiasis           Psychiatric: The patient reports no history of depression, anxiety, suicidal ideation, or homicidal ideation           Oncologic: The patient reports history of distal esophageal adenocarcinoma (Dx 6/2022)            ENT: The patient reports no hoarseness of voice, hearing loss, sinus congestion, tinnitus           Neurologic: The patient reports no history of seizure, stroke, or frequent headaches  PE: /63 (BP Location: Right arm)   Pulse 52   Temp 97.7 °F (36.5 °C) (Oral)   Resp 16   Ht 6' 2\" (1.88 m)   Wt 181 lb 10.5 oz  (82.4 kg)   SpO2 95%   BMI 23.32 kg/m²   Gen: AAO x 3, able to speak in complete sentences  HENT: EOMI, PERRL, oropharynx is clear with moist mucosal membranes  Eyes: Sclerae are anicteric  Neck:  Supple without nuchal rigidity  CV: Regular rate and rhythm, with normal S1 and S2  Resp: Clear to auscultation bilaterally without wheezes; rubs, rhonchi, or rales  Abdomen: Soft, non-tender, non-distended with the presence of bowel sounds; No hepatosplenomegaly; no rebound or guarding; No ascites is clinically apparent; no tympany to percussion  Ext: No peripheral edema or cyanosis  Skin: Warm and dry  Psychiatric: Appropriate mood and congruent affect without obvious depression or anxiety  Labs:    Recent Labs   Lab 03/06/24 2023 03/07/24  0549   * 89   BUN 15 13   CREATSERUM 0.84 0.62*   CA 8.3* 8.6   * 136   K 3.9 3.9    104   CO2 29.0 30.0     Recent Labs   Lab 03/07/24  0549   RBC 3.36*   HGB 9.7*   HCT 30.9*   MCV 92.0   MCH 28.9   MCHC 31.4   RDW 14.3   NEPRELIM 3.86   WBC 5.2   .0*       Recent Labs   Lab 03/06/24 2023 03/07/24  0549   ALT 24 23   AST 19 16                   Imaging:   CT c/a/p:  Impression   CONCLUSION:       1. Extensive patchy consolidative and tree-in-bud type opacities throughout the lungs with some areas improved and others worse compared to 01/29/2024.  Findings may reflect aspiration pneumonia or other inflammatory process.  Given the solid nodular  components, metastatic disease cannot be excluded.  Recommend continued attention on follow-up.     2. Hypoattenuating lesions of hepatic segment 8 correspond to areas of FDG avidity on PET-CT of 01/04/2024 most consistent with metastases, generally stable in size in the interim.     3. No definite CT correlate for focus of peripancreatic hypermetabolism noted on PET-CT of 01/04/2024.  No new sites of disease identified within the abdomen.     Impression: Mr. Diego Champion Is a 52 y/o with a PMHx of that includes  CAD s/p CABG, HTN, dyslipidemia, and distal esophageal adenocarcinoma (Dx 6/2022) s/p chemo RT followed by laparoscopic robotic assisted esophagogastrectomy with J-tube placement (9/2022; Dr. Lu) recovery C/B anastomotic leak s/p endoscopic closure with stenting (Dr. Ritchie) with course further C/B GOO s/p EGD with esophageal stenting plus Botox injections (11/2022; Dr. Ritchie).  Patient underwent EUS for pancreatic head mass (4/2023; Dr. Ritchie) with biopsy revealing moderate to poorly differentiated carcinoma--unclear if metastatic esophageal to pancreas versus secondary pancreatic primary.  He presents to the hospital for this admission on 3/6/24 for abnormal CT that revealed extensive patchy consolidative and tree -in-bud type opacities involving dependent portions of the upper/lower lobes and lingula. GI consulted for abdominal pain, that has been ongoing since November 2023 along with reports of dysphagia and odynophagia. CT c/a/p on 3/6 also notes hypo attenuating lesions corresponds to areas of FDG avidity on PET-CT of 01/04/24 most consistent with metastasis, no new sites of disease identified within abdomen.  He has had multiple endoscopic evaluations with Dr. Yadav and Dr. Ritchie since 6/7/2022 up until most recent EGD/bronchoscopy on 2/20/24.     Recurrent Abdominal Pain - CT c/a/p on 3/6 also notes hypo attenuating hepatic lesions corresponds to areas of FDG avidity on PET-CT of 01/04/24 most consistent with metastasis, no new sites of disease identified within abdomen.         Gastric emptying study ---pending  Recommendations:     NPO after MN  Gastric Emptying study in am  Will await results of gastric emptying study; if negative will consider esophogram for further evaluation.     Thank you for the consultation, we will follow the patient with you.  Attending addendum Dr. Jack Sierra to follow later today and provide formal, final recommendations at that time   Sofia Marie  APRN  3:06 PM  3/8/2024  [facility]  [phone]    GI attending addendum:    I have personally seen and examined this patient and agree with above nurse practitioner's assessment and recommendations.     Briefly, patient is a 53-year-old male with history of esophageal adenocarcinoma with complex surgical course as described above that presented to the ER on March 6, 2024 after outpatient CT scan was performed concerning for pneumonia.  He had a previously ordered gastric emptying study as outpatient which was scheduled.  We were consulted for evaluation of abdominal pain and potential concern for aspiration as cause for his pneumonia.  He notes his abdominal pain is chronic and is no different than normal.  The CT scan prior to admission was performed with p.o. contrast and no active extravasation was noted over his anastomosis and previous fistula tract.  On physical exam, patient was resting comfortably bed.  His abdomen was soft, mildly tender to palpation in the upper abdomen, and nondistended.  His wife is present in the room.  Patient with prior history of esophageal cancer status post esophagectomy and complex postop history that initially presented with pneumonia and concern for possible aspiration.  Suspect this is less likely due to anastomotic leak given no culture exacerbation noted on his recent CT with oral contrast.  Will further evaluate with gastric emptying study which had previously been ordered as outpatient which is now tentatively planned for tomorrow.  If this is unremarkable then may need esophagram to further confirm fistulous tract remains closed.  Pain control per primary and he is currently at his chronic baseline.  Continue antibiotics per infectious disease.  Thank you for the consultation.  Will continue to follow along with you.    Giuseppe Sierra DO  8:56 PM  3/8/2024  [facility]  [phone]

## 2024-03-08 NOTE — PROGRESS NOTES
[facility]  Pulmonary/Critical Care Consult note    Dontae Buddy Ortegacristina Arechiga. Patient Status:  Inpatient    10/15/1970 MRN [id number]   Location [facility] 4NW-A Attending Sakina Hernandez MD   Hosp Day # 1 PCP Adrian Horowitz MD          History of Present Illness:     Feels much better.  Less short of breath today.      History:  Past Medical History:   Diagnosis Date    Back problem     Belching 2022    Black stools 2022    Borderline diabetes     Dx in 2013 - HgA1C 6.2%    C. difficile diarrhea 10/23/2022    pt treated and without symptoms    Chest pain 2022    Coronary artery disease     On 13: CABG x 4 with LIMA to LAD and SVG to diagonal, OM and PDA    Decorative tattoo 2000    Depression     Difficult intubation     h/o esophagectomy for CA; developed esophagobronchial vistula    Disorder of liver     LIVER CA    Esophageal cancer (HCC)     completed chemo    Esophageal reflux     Essential hypertension     Exposure to medical diagnostic radiation     last tx 2022    Frequent urination 2013    Gastroparesis     Heartburn 2022    High blood pressure     High cholesterol 2013    Found when I had quadruple bypass    History of COVID-19 10/16/2022    asymptomatic - pt was dx during a hospitalization for another diagnosis. No continued symptoms    History of stomach ulcers     Hyperlipidemia     Hyperlipidemia LDL goal < 70     Indigestion 2022    Morbid obesity with BMI of 40.0-44.9, adult (HCC)     Muscle weakness     Nontoxic multinodular goiter     Dx in 2013: pt was told that imaging showed thyroid cysts per PCP    Pancreatic cancer (HCC)     last dose 2023 is scheduled for another round 23    Peripheral vascular disease (HCC)     pt denies    Personal history of antineoplastic chemotherapy     for esophageal cancer/completed    Personal history of antineoplastic chemotherapy 2023    pancreatic cancer    Problems with  swallowing 02/01/2022    Pulmonary nodules     Dx in 8/2013: CT chest showed small bilateral fissural-based lung nodules less than 1 cm    S/P CABG x 4     On 8/16/13: CABG x 4 with LIMA to LAD and SVG to diagonal, OM and PDA    Shortness of breath     when coughing; no oxygen    Vomiting 02/01/2022     Past Surgical History:   Procedure Laterality Date    APPENDECTOMY      APPENDECTOMY      ARTHROSCOPY OF JOINT UNLISTED      right shoulder    CABG      On 8/16/13: CABG x 4 with LIMA to LAD and SVG to diagonal, OM and PDA    CARDIAC CATH LAB      On 8/14/2013: cardiac cath showed 3-vessel disease    OTHER SURGICAL HISTORY      1.       Laparoscopic robotic-assisted esophagogastrectomy.    PORT, INDWELLING, IMP       Family History   Problem Relation Age of Onset    Cancer Mother         breast and colon     Diabetes Neg       reports that he quit smoking about 10 years ago. His smoking use included cigarettes. He has a 27 pack-year smoking history. He has never used smokeless tobacco. He reports that he does not currently use alcohol. He reports that he does not use drugs.    Allergies:  No Known Allergies    Medications:    Current Facility-Administered Medications:     metoclopramide (Reglan) tab 10 mg, 10 mg, Oral, TID AC and HS    piperacillin-tazobactam (Zosyn) 3.375 g in dextrose 5% 100 mL IVPB-ADDV, 3.375 g, Intravenous, Q8H    vancomycin (Vancocin) cap 125 mg, 125 mg, Oral, Daily    gabapentin (Neurontin) cap 300 mg, 300 mg, Oral, BID    losartan (Cozaar) tab 50 mg, 50 mg, Oral, Daily    metoprolol succinate ER (Toprol XL) 24 hr tab 50 mg, 50 mg, Oral, Daily Beta Blocker    pantoprazole (Protonix) DR tab 40 mg, 40 mg, Oral, QAM AC    sertraline (Zoloft) tab 200 mg, 200 mg, Oral, Daily    acetaminophen (Tylenol Extra Strength) tab 500 mg, 500 mg, Oral, Q4H PRN    melatonin tab 3 mg, 3 mg, Oral, Nightly PRN    guaiFENesin ER (Mucinex) 12 hr tab 600 mg, 600 mg, Oral, BID PRN    benzonatate (Tessalon) cap 200  mg, 200 mg, Oral, TID PRN    glycerin-hypromellose- (Artifical Tears) 0.2-0.2-1 % ophthalmic solution 1 drop, 1 drop, Both Eyes, QID PRN    sodium chloride (Saline Mist) 0.65 % nasal solution 1 spray, 1 spray, Each Nare, Q3H PRN    enoxaparin (Lovenox) 40 MG/0.4ML SUBQ injection 40 mg, 40 mg, Subcutaneous, Daily    ondansetron (Zofran) 4 MG/2ML injection 4 mg, 4 mg, Intravenous, Q6H PRN    prochlorperazine (Compazine) 10 MG/2ML injection 5 mg, 5 mg, Intravenous, Q8H PRN    polyethylene glycol (PEG 3350) (Miralax) 17 g oral packet 17 g, 17 g, Oral, Daily PRN    sennosides (Senokot) tab 17.2 mg, 17.2 mg, Oral, Nightly PRN    bisacodyl (Dulcolax) 10 MG rectal suppository 10 mg, 10 mg, Rectal, Daily PRN    fleet enema (Fleet) 7-19 GM/118ML rectal enema 133 mL, 1 enema, Rectal, Once PRN    HYDROmorphone (Dilaudid) 1 MG/ML injection 0.2 mg, 0.2 mg, Intravenous, Q2H PRN **OR** HYDROmorphone (Dilaudid) 1 MG/ML injection 0.4 mg, 0.4 mg, Intravenous, Q2H PRN **OR** HYDROmorphone (Dilaudid) 1 MG/ML injection 0.8 mg, 0.8 mg, Intravenous, Q2H PRN    pancrelipase (Lip-Prot-Amyl) (Zenpep) DR particles cap 50,000 Units, 50,000 Units, Oral, TID CC    heparin (Porcine) 100 Units/mL lock flush 500 Units, 5 mL, Intravenous, PRN    diphenhydrAMINE (Benadryl) 50 mg/mL  injection 12.5 mg, 12.5 mg, Intravenous, Nightly PRN **OR** diphenhydrAMINE (Benadryl) cap/tab 25 mg, 25 mg, Oral, Nightly PRN    Intake/Output:  No intake or output data in the 24 hours ending 03/08/24 1155     Body mass index is 23.32 kg/m².    Review of Systems  Review of Systems:   A comprehensive 10 point review of systems was completed.  Pertinent positives and negatives noted in the the HPI.         Patient Vitals for the past 24 hrs:   BP Temp Temp src Pulse Resp SpO2 Height Weight   03/07/24 1051 145/74 97.3 °F (36.3 °C) Oral 53 18 95 % -- --   03/07/24 0725 132/72 97.5 °F (36.4 °C) Oral 51 19 95 % -- --   03/07/24 0550 133/69 -- -- 53 -- -- -- --    03/07/24 0333 135/69 97.9 °F (36.6 °C) Oral 59 18 95 % -- --   03/07/24 0110 129/73 97.4 °F (36.3 °C) Oral 56 20 97 % -- 181 lb 10.5 oz (82.4 kg)   03/07/24 0015 153/76 -- -- 51 16 98 % -- --   03/06/24 2345 -- -- -- 53 16 97 % -- --   03/06/24 2252 119/54 -- -- 58 14 96 % -- --   03/06/24 2129 -- -- -- 58 16 97 % -- --   03/06/24 2045 -- 98 °F (36.7 °C) Oral -- -- -- -- --   03/06/24 2002 134/76 -- -- 56 16 99 % -- --   03/06/24 1901 130/77 97.5 °F (36.4 °C) Temporal 64 22 97 % 6' 2\" (1.88 m) 175 lb (79.4 kg)     Vitals:    03/07/24 0725 03/07/24 1051 03/07/24 1950 03/08/24 0523   BP: 132/72 145/74 159/72 138/73   BP Location: Right arm  Right arm Right arm   Pulse: 51 53 50 54   Resp: 19 18 20 20   Temp: 97.5 °F (36.4 °C) 97.3 °F (36.3 °C) 98 °F (36.7 °C) 97.9 °F (36.6 °C)   TempSrc: Oral Oral Oral Oral   SpO2: 95% 95% 97% 94%   Weight:       Height:             Physical Exam  Constitutional:       General: He is not in acute distress.     Appearance: Normal appearance. He is not ill-appearing or diaphoretic.   HENT:      Head: Normocephalic and atraumatic.      Nose: Nose normal. No congestion or rhinorrhea.      Mouth/Throat:      Mouth: Mucous membranes are moist.      Pharynx: Oropharynx is clear. No oropharyngeal exudate or posterior oropharyngeal erythema.   Eyes:      Extraocular Movements: Extraocular movements intact.      Pupils: Pupils are equal, round, and reactive to light.   Cardiovascular:      Rate and Rhythm: Normal rate.      Pulses: Normal pulses.      Heart sounds: Normal heart sounds. No murmur heard.  Pulmonary:      Effort: Pulmonary effort is normal. No respiratory distress.      Breath sounds: Normal breath sounds. No wheezing or rhonchi.      Comments: Diminished breath sounds in bilateral lower lung zones  Chest:      Chest wall: No tenderness.   Abdominal:      General: Abdomen is flat. Bowel sounds are normal.      Palpations: Abdomen is soft.      Comments: Mildly tender in mid  abdomen   Musculoskeletal:         General: Normal range of motion.   Skin:     General: Skin is warm.   Neurological:      General: No focal deficit present.      Mental Status: He is alert and oriented to person, place, and time.   Psychiatric:         Mood and Affect: Mood normal.         Behavior: Behavior normal.         Thought Content: Thought content normal.         Judgment: Judgment normal.            Lab Data Review:  Recent Labs   Lab 03/06/24 2023 03/07/24  0549   WBC 6.1 5.2   HGB 10.0* 9.7*   HCT 30.7* 30.9*   .0* 128.0*       Recent Labs   Lab 03/06/24 2023 03/07/24  0549   * 136   K 3.9 3.9    104   CO2 29.0 30.0   BUN 15 13   CREATSERUM 0.84 0.62*   CA 8.3* 8.6   ALB 2.6* 2.7*   ALKPHO 101 94   ALT 24 23   AST 19 16   * 89       No results for input(s): \"MG\" in the last 168 hours.    Lab Results   Component Value Date    PHOS 3.5 11/27/2023        No results for input(s): \"PT\", \"INR\", \"PTT\" in the last 168 hours.    No results for input(s): \"ABGPHT\", \"CITZFZ4P\", \"RPTHD3D\", \"ABGHCO3\", \"ABGBE\", \"TEMP\", \"TACOS\", \"SITE\", \"DEV\", \"THGB\" in the last 168 hours.    No results for input(s): \"TROP\", \"CKMB\" in the last 168 hours.    Cultures:   Hospital Encounter on 03/06/24   1. Blood Culture     Status: None (Preliminary result)    Collection Time: 03/06/24  9:28 PM    Specimen: Blood,peripheral   Result Value Ref Range    Blood Culture Result No Growth 1 Day N/A           Radiology personally reviewed:  XR CHEST AP PORTABLE  (CPT=71045)    Result Date: 3/6/2024  CONCLUSION:   Stable cardiac and mediastinal contours.  Reticular nodular opacities of the mid/lower lungs bilaterally are seen to better advantage on recent CT of 03/06/2024.  No associated pleural effusion or pneumothorax.  Left IJ chest port terminates near the cavoatrial junction.   LOCATION:  [facility]      Dictated by (CST): Richie Jay MD on 3/06/2024 at 10:10 PM     Finalized by (CST): Richie Jay MD on 3/06/2024 at  10:11 PM       CT CHEST+ABDOMEN (ALL CNTRST ONLY) (CPT=71260/57185)    Result Date: 3/6/2024  CONCLUSION:   1. Extensive patchy consolidative and tree-in-bud type opacities throughout the lungs with some areas improved and others worse compared to 01/29/2024.  Findings may reflect aspiration pneumonia or other inflammatory process.  Given the solid nodular components, metastatic disease cannot be excluded.  Recommend continued attention on follow-up.  2. Hypoattenuating lesions of hepatic segment 8 correspond to areas of FDG avidity on PET-CT of 01/04/2024 most consistent with metastases, generally stable in size in the interim.  3. No definite CT correlate for focus of peripancreatic hypermetabolism noted on PET-CT of 01/04/2024.  No new sites of disease identified within the abdomen.    LOCATION:  [facility]   Dictated by (CST): Richie Jay MD on 3/06/2024 at 5:14 PM     Finalized by (CST): Richie Jay MD on 3/06/2024 at 5:30 PM         Patient Active Problem List   Diagnosis    Primary hypertension    Hyperlipidemia with target low density lipoprotein (LDL) cholesterol less than 70 mg/dL    Coronary artery disease involving native coronary artery of native heart without angina pectoris    S/P CABG x 4    Nontoxic multinodular goiter    Pulmonary nodules    Thrombophlebitis leg    BRANDAN (generalized anxiety disorder)    Right shoulder Arthroscopy acromioplasty ,distal  clavicle resection, rotator cuff/labral debridment  Global 05/13/2021    Right bicipital tenosynovitis    Malignant neoplasm of esophagus (HCC)    Esophageal anastomotic leak    Esophageal obstruction    On total parenteral nutrition (TPN)    Mechanical complication of esophagostomy (HCC)    Abdominal pain of unknown etiology    Migration of esophageal stent    Gastroparesis    Esophageal carcinoma (HCC)    Normocytic anemia    Esophageal fistula    Subacute cough    Acquired bronchoesophageal fistula (HCC)    Pneumonia of both lower lobes due to  infectious organism    Malignant neoplasm of pancreas (HCC)    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 difficile colitis    Dysphagia, unspecified type    Shortness of breath    Hypokalemia    Dysphagia    Narcotic drug use    History of esophageal cancer    Palliative care by specialist    Neoplasm related pain    Endobronchial mass    Hyponatremia    Thrombocytopenia (HCC)    Acute kidney injury (HCC)    Hyperglycemia    Aspiration pneumonitis (HCC)    C. 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  Bilateral pulmonary nodules with tree-in-bud appearance suspect related to aspiration episodes differential diagnosis would include aspiration patchy pneumonia versus atypical mycobacterial or fungal infections versus metastasis although not typical.morphology on CT chest: Clinically improving today  Metastatic esophageal adenocarcinoma status post gastroesophagectomy on September 2022  History of pancreatic cancer  Hypertension  Hyperlipidemia  Coronary artery disease status post CABG x 4  Nontoxic multinodular goiter  Anemia  Thrombocytopenia      Plan:    DuoNebs every 6 hour as needed to help clear secretions  Continue pulmonary toilet  Continue current antibiotics  Follow labs, procalcitonin, in a.m.      Thank You for allowing me to participate in this patient's care     Dusty Brooks MD    Erythromycin Counseling:  I discussed with the patient the risks of erythromycin including but not limited to GI upset, allergic reaction, drug rash, diarrhea, increase in liver enzymes, and yeast infections.

## 2024-03-08 NOTE — PAYOR COMM NOTE
--------------  CONTINUED STAY REVIEW    Payor: [employer]  Subscriber #:  [id number]  Authorization Number: [id number]    Admit date: 3/7/24  Admit time: 12:53 AM    Admitting Physician:   Attending Physician:  Sakina Hernandez MD  Primary Care Physician: Adrian Horowitz MD    REVIEW DOCUMENTATION:    3-8-24      [facility] Consult Note     Reason for Consult: esophageal cancer      Assessment and Plan:  Bilateral lung nodules with consolidation and tree-in bud appearance which appears most consistent with recurrent aspiration: recent bronchoscopy with negative path (2/20/24). ID and pulmonary following.      Metastatic Esophageal cancer, HER2+: He is s/p chemoRT, esophagectomy (9/30/22) c/b esophageal leak. Due to progression, he was started on FOLFOX + Herceptin + Immunotherapy on 5/1/23. He had a great response to combined treatment but unfortunately progressed while on herceptin/immunotherapy alone. This might have also been due to frequent hospitalizations related to his bronchoesophageal fistula. He is now on FOLFOX + Opdivo + Herceptin again. He has received 2 cycles and the last was on 2/22/24. We are planning on a repeat PET after C4. His CT CAP here shows stable liver lesions and resolution of pancreatic lesion.   -He is due for a TTE. Requires q3 month while on herceptin      Broncho-esophageal fistula: on 1/17/24: EGD with fistula closure with ASD occluder and removal of the bronchial stent.     Anemia/Thrombocytopenia: likely related to acute illness and chemotherapy. Check Fe studies, B12, Folate     Recurrent C diff: follows with GI     Cancer related pain: follows with palliative care. Has follow up.       Hospitalist Progress Note     Brought up some phlegm overnight as reported by patient.  Shortness of breath and cough improving.  Afebrile.  On room air.  Has right-sided upper and mid abdominal pain    Assessment & Plan:   #Aspiration pneumonitis -recurrent  episodes  -Chest x-ray done on 3/6/2024 showed Stable cardiac and mediastinal contours.  Reticular nodular opacities of the mid/lower lungs bilaterally are seen to better advantage on recent CT of 03/06/2024.  No associated pleural effusion or pneumothorax.    -CT chest done on 3/6/2024 showed Extensive patchy consolidative and tree-in-bud type opacities throughout the lungs with some areas improved and others worse compared to 01/29/2024.  Findings may reflect aspiration pneumonia or other inflammatory process.  Given the solid nodular   components, metastatic disease cannot be excluded- had discussed with pulmonary Dr. Brooks regarding this.  -Blood cultures with no growth at 1 day  -Sputum culture ordered could not be done so far as patient not bringing up much phlegm  -Urine streptococcal antigen negative  -Urine antigen ordered  -Continue IV antibiotics  -Recently took course of oral antibiotics as outpatient for last week with no improvement according to patient.  -ID consult for recurrent episodes     #History of C. difficile in December 2023  -Oral vancomycin prophylaxis while on antibiotics     #Hypertension  -Losartan, metoprolol     #CAD s/p CABG  -Continue home metoprolol     #Hyperlipidemia     #Metastatic esophageal cancer s/p resection and gastroesophagostomy   #Metastatic pancreatic cancer  # Possible Mets to the liver  # Right-sided upper and mid abdominal pain due to above  -CT chest abdomen done on 3/6/2024 and also showed hypoattenuating lesions of hepatic segment 8 correspond to areas of FDG avidity on PET-CT of 01/04/2024 most consistent with metastases, generally stable in size in the interim. No visible mass, obstruction, or bowel wall thickening.  Stable focus of omental fat necrosis of the right mid abdomen.   -Patient follows up with oncology Dr. Foster as outpatient, on outpatient chemotherapy which was restarted on 2/5/2024 for progression of disease per oncology note .  Last chemo  received on 2/22/2024 per chart review.  -Was on outpatient immunotherapy  -Follows up with GI Dr. Ritchie as outpatient  -On chart review, patient had EGD bronchoscopy on 2/20/2024 with removal of moderate amount of mucus near the ASD occluder device, friable mucosa with small ulcer in the proximal bronchus and removal of the right mainstem lesion at that time and pathology of that lesion revealed inflamed edematous bronchial wall tissue with squamous metaplasia and congested blood vessels, few bacteria on surface, negative for malignant cells at that time, fungal stain negative at that time.  -Pain management            MEDICATIONS ADMINISTERED IN LAST 1 DAY:  diphenhydrAMINE (Benadryl) cap/tab 25 mg       Date Action Dose Route User    3/8/2024 1517 Given 25 mg Oral Mani-ChDolly barboza RN          enoxaparin (Lovenox) 40 MG/0.4ML SUBQ injection 40 mg       Date Action Dose Route User    3/8/2024 0756 Given 40 mg Subcutaneous (Left Lower Abdomen) Mani-ChDolly barboza RN          gabapentin (Neurontin) cap 300 mg       Date Action Dose Route User    3/8/2024 0800 Given 300 mg Oral Myrael-ChDolly barboza RN    3/7/2024 2035 Given 300 mg Oral BreEstrellita haskins RN          HYDROmorphone (Dilaudid) 1 MG/ML injection 0.8 mg       Date Action Dose Route User    3/8/2024 1245 Given 0.8 mg Intravenous Kisiel-ChDolly barboza RN    3/8/2024 1033 Given 0.8 mg Intravenous Kisiel-ChDolly barboza RN    3/8/2024 0745 Given 0.8 mg Intravenous Kisiel-ChyzDolly mcmahan RN    3/8/2024 0524 Given 0.8 mg Intravenous BreEstrellita haskins RN    3/8/2024 0225 Given 0.8 mg Intravenous BrenEstrellita byers RN    3/8/2024 0038 Given 0.8 mg Intravenous PerezSherry byers RN    3/7/2024 2238 Given 0.8 mg Intravenous BreEstrellita haskins RN    3/7/2024 2034 Given 0.8 mg Intravenous BrenEstrellita byers RN    3/7/2024 1818 Given 0.8 mg Intravenous Latha Rdz, DANY    3/7/2024 1615 Given 0.8 mg Intravenous Latha Rdz, DANY          losartan (Cozaar) tab 50 mg       Date Action Dose  Route User    3/8/2024 0756 Given 50 mg Oral Dolly Sharma RN          metoclopramide (Reglan) tab 10 mg       Date Action Dose Route User    3/8/2024 1045 Given 10 mg Oral Dolly Sharma RN          metoprolol succinate ER (Toprol XL) 24 hr tab 50 mg       Date Action Dose Route User    3/8/2024 0525 Given 50 mg Oral Estrellita Brown RN          pancrelipase (Lip-Prot-Amyl) (Zenpep) DR particles cap 50,000 Units       Date Action Dose Route User    3/8/2024 1330 Given 50,000 Units Oral Dolly Sharma RN    3/8/2024 0758 Given 50,000 Units Oral Dolly Sharma RN          pantoprazole (Protonix) DR tab 40 mg       Date Action Dose Route User    3/8/2024 0525 Given 40 mg Oral Estrellita Brown RN          polyethylene glycol (PEG 3350) (Miralax) 17 g oral packet 17 g       Date Action Dose Route User    3/8/2024 1513 Given 17 g Oral Dolly Sharma RN          piperacillin-tazobactam (Zosyn) 3.375 g in dextrose 5% 100 mL IVPB-ADDV       Date Action Dose Route User    3/8/2024 1330 New Bag 3.375 g Intravenous Dolly Sharma RN    3/8/2024 0524 New Bag 3.375 g Intravenous Estrellita Brown RN    3/7/2024 2035 New Bag 3.375 g Intravenous Estrellita Brown RN          sertraline (Zoloft) tab 200 mg       Date Action Dose Route User    3/8/2024 0756 Given 200 mg Oral Dolly Sharma RN          vancomycin (Vancocin) cap 125 mg       Date Action Dose Route User    3/8/2024 0756 Given 125 mg Oral Dolly Sharma RN            Vitals (last day)       Date/Time Temp Pulse Resp BP SpO2 Weight O2 Device O2 Flow Rate (L/min) [facility]    03/08/24 1334 97.7 °F (36.5 °C) 52 16 130/63 95 % -- None (Room air) -- [place]    03/08/24 0523 97.9 °F (36.6 °C) 54 20 138/73 94 % -- None (Room air) -- [place]    03/07/24 1950 98 °F (36.7 °C) 50 20 159/72 97 % -- None (Room air) -- [place]    03/07/24 1051 97.3 °F (36.3 °C) 53 18 145/74 95 % -- None (Room air) -- [place]    03/07/24 0725 97.5 °F (36.4 °C) 51 19 132/72 95 % -- None  (Room air) -- [place]    03/07/24 0550 -- 53 -- 133/69 -- -- -- --     03/07/24 0333 97.9 °F (36.6 °C) 59 18 135/69 95 % -- None (Room air) --     03/07/24 0110 97.4 °F (36.3 °C) 56 20 129/73 97 % 181 lb 10.5 oz None (Room air) -- E[place]    03/07/24 0015 -- 51 16 153/76 98 % -- None (Room air) -- [place]

## 2024-03-08 NOTE — PROGRESS NOTES
[facility]   part of [facility]     Hospitalist Progress Note     Dontae Buddy Cortez . Patient Status:  Inpatient    10/15/1970 MRN [id number]   Location [facility] 4NW-A Attending Sakina Hernandez MD   Hosp Day # 1 PCP Adrian Horowitz MD     Chief Complaint: Shortness of breath, cough    Subjective:     Brought up some phlegm overnight as reported by patient.  Shortness of breath and cough improving.  Afebrile.  On room air.  Has right-sided upper and mid abdominal pain    Objective:    Review of Systems:   A comprehensive review of systems was completed; pertinent positive and negatives stated in subjective.    Vital signs:  Temp:  [97.9 °F (36.6 °C)-98 °F (36.7 °C)] 97.9 °F (36.6 °C)  Pulse:  [50-54] 54  Resp:  [20] 20  BP: (138-159)/(72-73) 138/73  SpO2:  [94 %-97 %] 94 %    Physical Exam:    /73 (BP Location: Right arm)   Pulse 54   Temp 97.9 °F (36.6 °C) (Oral)   Resp 20   Ht 74\"   Wt 181 lb 10.5 oz (82.4 kg)   SpO2 94%   BMI 23.32 kg/m²     General: No acute distress  Respiratory: Clear to auscultation bilateral at this time, no wheezes, no rhonchi  Cardiovascular: S1, S2, regular rate and rhythm  Abdomen: Soft, Non-tender, non-distended, positive bowel sounds  Neuro: Awake alert, no new focal deficits.   Extremities: No pedal edema  No calf tenderness    Diagnostic Data:    Labs:  Recent Labs   Lab 24  0549   WBC 6.1 5.2   HGB 10.0* 9.7*   MCV 88.7 92.0   .0* 128.0*       Recent Labs   Lab 24  0549   * 89   BUN 15 13   CREATSERUM 0.84 0.62*   CA 8.3* 8.6   ALB 2.6* 2.7*   * 136   K 3.9 3.9    104   CO2 29.0 30.0   ALKPHO 101 94   AST 19 16   ALT 24 23   BILT 0.2 0.2   TP 6.5 6.2*       Estimated Creatinine Clearance: 158.4 mL/min (A) (based on SCr of 0.62 mg/dL (L)).    No results for input(s): \"TROP\", \"TROPHS\", \"CK\" in the last 168 hours.    No results for input(s): \"PTP\", \"INR\" in the last 168 hours.                Microbiology    Hospital Encounter on 03/06/24   1. Blood Culture     Status: None (Preliminary result)    Collection Time: 03/06/24  9:28 PM    Specimen: Blood,peripheral   Result Value Ref Range    Blood Culture Result No Growth 1 Day N/A         Imaging: Reviewed in Epic.  Chest x-ray done on 3/6/2024 showed  Stable cardiac and mediastinal contours.  Reticular nodular opacities of the mid/lower lungs bilaterally are seen to better advantage on recent CT of 03/06/2024.  No associated pleural effusion or pneumothorax.      Left IJ chest port terminates near the cavoatrial junction.     Medications:    metoclopramide  10 mg Oral TID AC and HS    piperacillin-tazobactam  3.375 g Intravenous Q8H    vancomycin  125 mg Oral Daily    gabapentin  300 mg Oral BID    losartan  50 mg Oral Daily    metoprolol succinate ER  50 mg Oral Daily Beta Blocker    pantoprazole  40 mg Oral QAM AC    sertraline  200 mg Oral Daily    enoxaparin  40 mg Subcutaneous Daily    lipase-protease-amylase (Lip-Prot-Amyl)  50,000 Units Oral TID CC       Assessment & Plan:      #Aspiration pneumonitis -recurrent episodes  -Chest x-ray done on 3/6/2024 showed Stable cardiac and mediastinal contours.  Reticular nodular opacities of the mid/lower lungs bilaterally are seen to better advantage on recent CT of 03/06/2024.  No associated pleural effusion or pneumothorax.    -CT chest done on 3/6/2024 showed Extensive patchy consolidative and tree-in-bud type opacities throughout the lungs with some areas improved and others worse compared to 01/29/2024.  Findings may reflect aspiration pneumonia or other inflammatory process.  Given the solid nodular   components, metastatic disease cannot be excluded- had discussed with pulmonary Dr. Brooks regarding this.  -Blood cultures with no growth at 1 day  -Sputum culture ordered could not be done so far as patient not bringing up much phlegm  -Urine streptococcal antigen negative  -Urine antigen  ordered  -Continue IV antibiotics  -Recently took course of oral antibiotics as outpatient for last week with no improvement according to patient.  -ID consult for recurrent episodes     #History of C. difficile in December 2023  -Oral vancomycin prophylaxis while on antibiotics     #Hypertension  -Losartan, metoprolol     #CAD s/p CABG  -Continue home metoprolol     #Hyperlipidemia     #Metastatic esophageal cancer s/p resection and gastroesophagostomy   #Metastatic pancreatic cancer  # Possible Mets to the liver  # Right-sided upper and mid abdominal pain due to above  -CT chest abdomen done on 3/6/2024 and also showed hypoattenuating lesions of hepatic segment 8 correspond to areas of FDG avidity on PET-CT of 01/04/2024 most consistent with metastases, generally stable in size in the interim. No visible mass, obstruction, or bowel wall thickening.  Stable focus of omental fat necrosis of the right mid abdomen.   -Patient follows up with oncology Dr. Foster as outpatient, on outpatient chemotherapy which was restarted on 2/5/2024 for progression of disease per oncology note .  Last chemo received on 2/22/2024 per chart review.  -Was on outpatient immunotherapy  -Follows up with GI Dr. Ritchie as outpatient  -On chart review, patient had EGD bronchoscopy on 2/20/2024 with removal of moderate amount of mucus near the ASD occluder device, friable mucosa with small ulcer in the proximal bronchus and removal of the right mainstem lesion at that time and pathology of that lesion revealed inflamed edematous bronchial wall tissue with squamous metaplasia and congested blood vessels, few bacteria on surface, negative for malignant cells at that time, fungal stain negative at that time.  -Pain management     Discussed with patient.  Discussed with RN.    Discharge planning once cleared by all specialist including pulmonary, ID, GI and oncology and follow-up with them as outpatient as directed.  Follow-up with regular  outpatient primary care physician within 1 week in office    Sakina Hernandez MD    Supplementary Documentation:     Quality:  DVT Mechanical Prophylaxis:   SCDs,    DVT Pharmacologic Prophylaxis   Medication    enoxaparin (Lovenox) 40 MG/0.4ML SUBQ injection 40 mg    heparin (Porcine) 100 Units/mL lock flush 500 Units                Code Status: Full Code  Neumann: No urinary catheter in place  Neumann Duration (in days):   Central line:    SHUBHAM:     Discharge is dependent on: Clinical progress, pulmonary and ID clearance  At this point Mr. Cortez is expected to be discharge to: Home    The 21st Century Cures Act makes medical notes like these available to patients in the interest of transparency. Please be advised this is a medical document. Medical documents are intended to carry relevant information, facts as evident, and the clinical opinion of the practitioner. The medical note is intended as peer to peer communication and may appear blunt or direct. It is written in medical language and may contain abbreviations or verbiage that are unfamiliar.

## 2024-03-08 NOTE — CONSULTS
[facility] Consult Note    Reason for Consult: esophageal destinyer  Medical Record Number: [id number]   CSN: [id number]   Referring Physician: No ref. provider found  PCP: Adrian Horowitz MD    History of Present Illness: 53M with a PMH of metastatic esophageal cancer, CAD s/p CABG, HLD, and HTN presented on 3/6/24 with productive cough with purulent sputum.  Noted to have 2-3 days of cough, dyspnea. No fevers. Of note, 1 week prior to admission he was at an outside facility and diagnosed with aspiration PNA and started on augmentin and doxycycline. States that he feels good overall.     Oncology History   -2018: Per report had a normal EGD and colonoscopy     -June 2022: He met with GI due to new onset dysphagia which started about 1 month prior to his visit.  He had an esophagram with a focal abrupt narrowing with irregular margins in the distal one third of the esophagus extending to the GE junction.  He also had an episode of food impaction. He has also lost about 15 lb. He was a heavy smoker from age 14 to about 40 (1.5 PPD). Also with alcohol use currently. Mother with a history of breast and colon cancer.      -EGD and EUS with Dr. Yadav on 6/7/2022: Severe esophagitis with a concerning mass extending 37-39 centimeters from the incisors. Nonobstructive mass.  By EUS uT3N1 disease.  Pathology showed invasive moderate to poorly differentiated adenocarcinoma.  HER2 amplified by FISH     -PET/CT on 6/20/2022: Increased distal esophageal uptake with an SUV of 14.4.  Concern for shreya metastases.     -Concurrent chemoradiation with carboplatin AUC2 plus paclitaxel 50 mg/m2: 7/6/22-8/10/22:  went from 137 to 30.9.     -PET/CT on 9/6/22: showed overall improvement    -Surgery with Dr. Lu on 9/30/22: ypT1b pN0. Recovery has been complicated by an esophageal leak     -I met with him on 12/12/22. We discussed adjuvant opdivo for 1 year. His  was elevated to 48 and repeat was 64. CT CAP was  recommended.      -He was admitted on 12/25/22 for abdominal pain. He had a POEM procedure done. He was also noted to have a RLL consolidation. He was seen by pulmonary, based on imaging an outpatient PET/CT was recommended and a biopsy of the most FDG avid lesion would be considered. Noted to have CAD and an outpatient evaluation was recommended.     -PET/CT on 1/4/23-imaging reviewed in tumor board and no focal areas of concern and recommendation was to start immunotherapy.     -Adjuvant Opdivo: 1/19/2023-3/6/23. During this time, his tumor marker was rising but imaging was negative. Imaging from 4/10/23 was concerning a pancreatic head mass. HE underwent and EGD on 4/7/232 with dr. Hinson. 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 Her 2 IHC was 2+ on this specimen but FISH was negative. Due to these findings, treatment for metastatic esophageal cancer was considered and he was started on FOLFOX + Herceptin + IO.     FOLFOX + Herceptin + Immunotherapy  -C1: 5/1/23:   -C2: 5/15/23:  1454:   -C3: 6/5/23: Switched to Xeloda, Oxaliplatin, Herceptin, Keytruda due to national 5-FU shortage:  329  -C4: 7/10/23: Delayed to the thrombocytopenia:  79 to 35 Will switch back to 5-FU  -C5: 7/24/23:  21.5  -PET/CT from 7/27/23 showed resolution of disease   -C6: 8/7/23:  15.1  -C7: 8/28/23: Ca19-9 Held Oxaliplatin d/t neuropathy.  13.9  -C8: 9/11/23:  12.3 Oxaliplatin held. Signatera negative     -Herceptin and Keytruda maintenance q3 weeks since Signatera was negative   -C1: 9/25/23:  10.3  -C2: 10/9/23: Ca19-9 15. 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 Also noted to have diffuse GGO and MS/Hilar LAD-unclear if this related to previous infections. Given findings, restarting FOLFOX + herceptin + IO recommended.     -admitted from 1/8/24-1/13/24 for persistent dysphagia and bronchesophageal stent. Plan is for an ASD occluder and removal of the bronchial stent as an outpatient.      -1/17/24: EGD with fistula closure with ASD occluder and removal of the bronchial stent.    -1/22/24: Hercepin + IO treatment only since he was having sternotomy wires removed later in the week     -FOLFOX + Herceptin + Opdivo restarted    -C1: 2/5/24:  846   -C2: 2/22/24:  was not drawn    -Admitted for aspiration PNA on 3/6/24-CT Chest/Abdomen showed extensive patchy consolidative and tree in bud appearance. There are some solid nodular components.. No new sites of disease in abdomen. Stable hepatic lesion. Of note, a right mainstain lesion was biopsied by pulmonary on 2/20/24 and was negative for cancer.     Review of Systems: 12 Point ROS was completed and pertinent positives are in the HPI    Medications:    Current Facility-Administered Medications:     piperacillin-tazobactam (Zosyn) 3.375 g in dextrose 5% 100 mL IVPB-ADDV, 3.375 g, Intravenous, Q8H    vancomycin (Vancocin) cap 125 mg, 125 mg, Oral, Daily    gabapentin (Neurontin) cap 300 mg, 300 mg, Oral, BID    losartan (Cozaar) tab 50 mg, 50 mg, Oral, Daily    metoprolol succinate ER (Toprol XL) 24 hr tab 50 mg, 50 mg, Oral, Daily Beta Blocker    pantoprazole (Protonix) DR tab 40 mg, 40 mg, Oral, QAM AC    sertraline (Zoloft) tab 200 mg, 200 mg, Oral, Daily    acetaminophen (Tylenol Extra Strength) tab 500 mg, 500 mg, Oral, Q4H PRN    melatonin tab 3 mg, 3 mg, Oral, Nightly PRN    guaiFENesin ER (Mucinex) 12 hr tab 600 mg, 600 mg, Oral, BID  PRN    benzonatate (Tessalon) cap 200 mg, 200 mg, Oral, TID PRN    glycerin-hypromellose- (Artifical Tears) 0.2-0.2-1 % ophthalmic solution 1 drop, 1 drop, Both Eyes, QID PRN    sodium chloride (Saline Mist) 0.65 % nasal solution 1 spray, 1 spray, Each Nare, Q3H PRN    enoxaparin (Lovenox) 40 MG/0.4ML SUBQ injection 40 mg, 40 mg, Subcutaneous, Daily    ondansetron (Zofran) 4 MG/2ML injection 4 mg, 4 mg, Intravenous, Q6H PRN    prochlorperazine (Compazine) 10 MG/2ML injection 5 mg, 5 mg, Intravenous, Q8H PRN    polyethylene glycol (PEG 3350) (Miralax) 17 g oral packet 17 g, 17 g, Oral, Daily PRN    sennosides (Senokot) tab 17.2 mg, 17.2 mg, Oral, Nightly PRN    bisacodyl (Dulcolax) 10 MG rectal suppository 10 mg, 10 mg, Rectal, Daily PRN    fleet enema (Fleet) 7-19 GM/118ML rectal enema 133 mL, 1 enema, Rectal, Once PRN    HYDROmorphone (Dilaudid) 1 MG/ML injection 0.2 mg, 0.2 mg, Intravenous, Q2H PRN **OR** HYDROmorphone (Dilaudid) 1 MG/ML injection 0.4 mg, 0.4 mg, Intravenous, Q2H PRN **OR** HYDROmorphone (Dilaudid) 1 MG/ML injection 0.8 mg, 0.8 mg, Intravenous, Q2H PRN    pancrelipase (Lip-Prot-Amyl) (Zenpep) DR particles cap 50,000 Units, 50,000 Units, Oral, TID CC    heparin (Porcine) 100 Units/mL lock flush 500 Units, 5 mL, Intravenous, PRN    diphenhydrAMINE (Benadryl) 50 mg/mL  injection 12.5 mg, 12.5 mg, Intravenous, Nightly PRN **OR** diphenhydrAMINE (Benadryl) cap/tab 25 mg, 25 mg, Oral, Nightly PRN    Past Medical History:   Diagnosis Date    Back problem     Belching 02/01/2022    Black stools 02/01/2022    Borderline diabetes     Dx in 8/2013 - HgA1C 6.2%    C. difficile diarrhea 10/23/2022    pt treated and without symptoms    Chest pain 02/01/2022    Coronary artery disease     On 8/16/13: CABG x 4 with LIMA to LAD and SVG to diagonal, OM and PDA    Decorative tattoo 01/01/2000    Depression     Difficult intubation     h/o esophagectomy for CA; developed esophagobronchial vistula    Disorder  of liver     LIVER CA    Esophageal cancer (HCC)     completed chemo    Esophageal reflux     Essential hypertension     Exposure to medical diagnostic radiation     last tx 8/18/2022    Frequent urination 01/01/2013    Gastroparesis     Heartburn 02/01/2022    High blood pressure     High cholesterol 08/01/2013    Found when I had quadruple bypass    History of COVID-19 10/16/2022    asymptomatic - pt was dx during a hospitalization for another diagnosis. No continued symptoms    History of stomach ulcers     Hyperlipidemia     Hyperlipidemia LDL goal < 70     Indigestion 02/01/2022    Morbid obesity with BMI of 40.0-44.9, adult (HCC)     Muscle weakness     Nontoxic multinodular goiter     Dx in 8/2013: pt was told that imaging showed thyroid cysts per PCP    Pancreatic cancer (HCC)     last dose 12/4/2023 is scheduled for another round 12/27/23    Peripheral vascular disease (HCC)     pt denies    Personal history of antineoplastic chemotherapy     for esophageal cancer/completed    Personal history of antineoplastic chemotherapy 12/2023    pancreatic cancer    Problems with swallowing 02/01/2022    Pulmonary nodules     Dx in 8/2013: CT chest showed small bilateral fissural-based lung nodules less than 1 cm    S/P CABG x 4     On 8/16/13: CABG x 4 with LIMA to LAD and SVG to diagonal, OM and PDA    Shortness of breath     when coughing; no oxygen    Vomiting 02/01/2022     Past Surgical History:   Procedure Laterality Date    APPENDECTOMY      APPENDECTOMY      ARTHROSCOPY OF JOINT UNLISTED      right shoulder    CABG      On 8/16/13: CABG x 4 with LIMA to LAD and SVG to diagonal, OM and PDA    CARDIAC CATH LAB      On 8/14/2013: cardiac cath showed 3-vessel disease    OTHER SURGICAL HISTORY      1.       Laparoscopic robotic-assisted esophagogastrectomy.    PORT, INDWELLING, IMP       Social History     Socioeconomic History    Marital status:     Number of children: 3   Occupational History     Occupation: works as  - on workman's comp   Tobacco Use    Smoking status: Former     Packs/day: 1.00     Years: 27.00     Additional pack years: 0.00     Total pack years: 27.00     Types: Cigarettes     Quit date: 8/15/2013     Years since quitting: 10.5    Smokeless tobacco: Never    Tobacco comments:     Quit smoking 2013   Vaping Use    Vaping Use: Never used   Substance and Sexual Activity    Alcohol use: Not Currently    Drug use: Never    Sexual activity: Not Currently     Partners: Female      Family History   Problem Relation Age of Onset    Cancer Mother         breast and colon     Diabetes Neg        Physical Exam  /73 (BP Location: Right arm)   Pulse 54   Temp 97.9 °F (36.6 °C) (Oral)   Resp 20   Ht 1.88 m (6' 2\")   Wt 82.4 kg (181 lb 10.5 oz)   SpO2 94%   BMI 23.32 kg/m²    General: NAD, AOX3  HEENT: clear op, mmm, no jvd. EOM intact, no scleral icterus   CV: RRR S1S2 no murmurs  Extremities: No edema  Lungs: CTAB, no increased work of breathing  Abd: soft nt nd +BS no hepatosplenomegaly  Neuro: CN: II-XII grossly intact  Psych: Normal Mood and affect     Results:  Lab Results   Component Value Date    WBC 5.2 03/07/2024    HGB 9.7 (L) 03/07/2024    HCT 30.9 (L) 03/07/2024    MCV 92.0 03/07/2024    .0 (L) 03/07/2024    EOSABS 0.19 12/19/2022     Lab Results   Component Value Date     03/07/2024    K 3.9 03/07/2024    CO2 30.0 03/07/2024     03/07/2024    BUN 13 03/07/2024    PHOS 3.5 11/27/2023    ALB 2.7 (L) 03/07/2024       Radiology: reviewed     Pathology: reviewed     Assessment and Plan:  Bilateral lung nodules with consolidation and tree-in bud appearance which appears most consistent with recurrent aspiration: recent bronchoscopy with negative path (2/20/24). ID and pulmonary following.     Metastatic Esophageal cancer, HER2+: He is s/p chemoRT, esophagectomy (9/30/22) c/b esophageal leak. Due to progression, he was started on FOLFOX + Herceptin +  Immunotherapy on 5/1/23. He had a great response to combined treatment but unfortunately progressed while on herceptin/immunotherapy alone. This might have also been due to frequent hospitalizations related to his bronchoesophageal fistula. He is now on FOLFOX + Opdivo + Herceptin again. He has received 2 cycles and the last was on 2/22/24. We are planning on a repeat PET after C4. His CT CAP here shows stable liver lesions and resolution of pancreatic lesion.   -He is due for a TTE. Requires q3 month while on herceptin     Broncho-esophageal fistula: on 1/17/24: EGD with fistula closure with ASD occluder and removal of the bronchial stent.    Anemia/Thrombocytopenia: likely related to acute illness and chemotherapy. Check Fe studies, B12, Folate    Recurrent C diff: follows with GI    Cancer related pain: follows with palliative care. Has follow up.     Oncology will follow peripherally. Has follow up on 3/12/24    [facility]

## 2024-03-08 NOTE — PLAN OF CARE
Pt Aox4. VSS. RA.  Up independent.   Reported abdominal pain. Dilaudid 0.8mg administered PRN.     NPO after midnight. Reglan and all narcotics to be on hold before the NM Gastric empting study on 3/9 10:00.       Problem: PAIN - ADULT  Goal: Verbalizes/displays adequate comfort level or patient's stated pain goal  Description: INTERVENTIONS:  - Encourage pt to monitor pain and request assistance  - Assess pain using appropriate pain scale  - Administer analgesics based on type and severity of pain and evaluate response  - Implement non-pharmacological measures as appropriate and evaluate response  - Consider cultural and social influences on pain and pain management  - Manage/alleviate anxiety  - Utilize distraction and/or relaxation techniques  - Monitor for opioid side effects  - Notify MD/LIP if interventions unsuccessful or patient reports new pain  - Anticipate increased pain with activity and pre-medicate as appropriate  Outcome: Progressing

## 2024-03-08 NOTE — PROGRESS NOTES
Pt A&Ox4 on room air, complaints of pain, prn diludid given Q2hr. ID and med onc consulted, sent pages out will see pt tomorrow. Tolerating medications well per mar. VSS. Call light within reach, frequent checks made, needs met.

## 2024-03-08 NOTE — PROGRESS NOTES
[facility]   part of [facility]     Hospitalist Progress Note     Dontae Buddy Cortez . Patient Status:  Inpatient    10/15/1970 MRN [id number]   Location [facility] 4NW-A Attending Sakina Hernandez MD   Hosp Day # 0 PCP Adrian Horowitz MD     Chief Complaint: Shortness of breath, cough    Subjective:     Patient seen with patient's wife at bedside.  Shortness of breath and cough improving.  Afebrile.  On room air.    Objective:    Review of Systems:   A comprehensive review of systems was completed; pertinent positive and negatives stated in subjective.    Vital signs:  Temp:  [97.3 °F (36.3 °C)-98 °F (36.7 °C)] 97.3 °F (36.3 °C)  Pulse:  [51-64] 53  Resp:  [14-22] 18  BP: (119-153)/(54-77) 145/74  SpO2:  [95 %-99 %] 95 %    Physical Exam:    /74   Pulse 53   Temp 97.3 °F (36.3 °C) (Oral)   Resp 18   Ht 6' 2\" (1.88 m)   Wt 181 lb 10.5 oz (82.4 kg)   SpO2 95%   BMI 23.32 kg/m²     General: No acute distress  Respiratory: Clear to auscultation bilateral at this time, no wheezes, no rhonchi  Cardiovascular: S1, S2, regular rate and rhythm  Abdomen: Soft, Non-tender, non-distended, positive bowel sounds  Neuro: Awake alert, no new focal deficits.   Extremities: No pedal edema  No calf tenderness    Diagnostic Data:    Labs:  Recent Labs   Lab 24  0549   WBC 6.1 5.2   HGB 10.0* 9.7*   MCV 88.7 92.0   .0* 128.0*       Recent Labs   Lab 24  0549   * 89   BUN 15 13   CREATSERUM 0.84 0.62*   CA 8.3* 8.6   ALB 2.6* 2.7*   * 136   K 3.9 3.9    104   CO2 29.0 30.0   ALKPHO 101 94   AST 19 16   ALT 24 23   BILT 0.2 0.2   TP 6.5 6.2*       Estimated Creatinine Clearance: 158.4 mL/min (A) (based on SCr of 0.62 mg/dL (L)).    No results for input(s): \"TROP\", \"TROPHS\", \"CK\" in the last 168 hours.    No results for input(s): \"PTP\", \"INR\" in the last 168 hours.               Microbiology    No results found for this visit on  03/06/24.      Imaging: Reviewed in Epic.  Chest x-ray done on 3/6/2024 showed  Stable cardiac and mediastinal contours.  Reticular nodular opacities of the mid/lower lungs bilaterally are seen to better advantage on recent CT of 03/06/2024.  No associated pleural effusion or pneumothorax.      Left IJ chest port terminates near the cavoatrial junction.     Medications:    piperacillin-tazobactam  3.375 g Intravenous Q8H    vancomycin  125 mg Oral Daily    gabapentin  300 mg Oral BID    losartan  50 mg Oral Daily    metoprolol succinate ER  50 mg Oral Daily Beta Blocker    pantoprazole  40 mg Oral QAM AC    sertraline  200 mg Oral Daily    enoxaparin  40 mg Subcutaneous Daily    lipase-protease-amylase (Lip-Prot-Amyl)  50,000 Units Oral TID CC       Assessment & Plan:      #Aspiration pneumonitis -recurrent episodes  -Chest x-ray done on 3/6/2024 showed Stable cardiac and mediastinal contours.  Reticular nodular opacities of the mid/lower lungs bilaterally are seen to better advantage on recent CT of 03/06/2024.  No associated pleural effusion or pneumothorax.    -CT chest done on 3/6/2024 showed Extensive patchy consolidative and tree-in-bud type opacities throughout the lungs with some areas improved and others worse compared to 01/29/2024.  Findings may reflect aspiration pneumonia or other inflammatory process.  Given the solid nodular   components, metastatic disease cannot be excluded..  -Blood cultures  ordered  -Sputum culture ordered  -Urine antigen ordered  -Continue IV antibiotics  -Recently took course of oral antibiotics as outpatient for last week with no improvement according to patient.  -ID consult for recurrent episodes     #History of C. difficile in December 2023  -Oral vancomycin prophylaxis while on antibiotics     #Hypertension  -Losartan, metoprolol     #CAD s/p CABG  -Continue home metoprolol     #Hyperlipidemia     #Metastatic esophageal cancer s/p resection and gastroesophagostomy    #Metastatic pancreatic cancer  # Possible Mets to the liver  -CT chest abdomen done on 3/6/2024 and also showed hypoattenuating lesions of hepatic segment 8 correspond to areas of FDG avidity on PET-CT of 01/04/2024 most consistent with metastases, generally stable in size in the interim.   -Patient follows up with oncology Dr. Foster as outpatient, on outpatient chemotherapy which was restarted on 2/5/2024 for progression of disease per oncology note .  Last chemo received on 2/22/2024 per chart review.  -Was on outpatient immunotherapy  -On chart review, patient had EGD bronchoscopy on 2/20/2024 with removal of moderate amount of mucus near the ASD occluder device, friable mucosa with small ulcer in the proximal bronchus and removal of the right mainstem lesion at that time and pathology of that lesion revealed inflamed edematous bronchial wall tissue with squamous metaplasia and congested blood vessels, few bacteria on surface, negative for malignant cells at that time, fungal stain negative at that time.     Discussed with patient, patient's wife at bedside.  Discussed with pulmonary Dr. Brooks, will also get ID consult as recommended by pulmonary.    Sakina Hernandez MD    Supplementary Documentation:     Quality:  DVT Mechanical Prophylaxis:   SCDs,    DVT Pharmacologic Prophylaxis   Medication    enoxaparin (Lovenox) 40 MG/0.4ML SUBQ injection 40 mg    heparin (Porcine) 100 Units/mL lock flush 500 Units                Code Status: Full Code  Neumann: No urinary catheter in place  Neumann Duration (in days):   Central line:    SHUBHAM:     Discharge is dependent on: Clinical progress, pulmonary and ID clearance  At this point Mr. Cortez is expected to be discharge to: Home    The 21st Century Cures Act makes medical notes like these available to patients in the interest of transparency. Please be advised this is a medical document. Medical documents are intended to carry relevant information, facts as evident, and the  clinical opinion of the practitioner. The medical note is intended as peer to peer communication and may appear blunt or direct. It is written in medical language and may contain abbreviations or verbiage that are unfamiliar.

## 2024-03-08 NOTE — CONSULTS
[facility]   part of [facility] ID CONSULT NOTE    Dontae Cortez Jr. Patient Status:  Inpatient    10/15/1970 MRN [id number]   Location [facility] 4NW-A Attending Sakina Hernandez MD   Hosp Day # 1 PCP Adrian Horowitz MD       Reason for Consultation:  Antibiotic management, recurrent aspiration pneumonitis    History of Present Illness:  Dontae Cortez Jr. is a 53 year old male with a history of HTN, CAD s/p CABG, metastatic esophageal cancer s/p gastroesphagectomy 2022- now with a bronchoesophageal fistula. Patient presents with pneumonia. He states he had a CT done outpatient and was advised to come to the ED.     Patient states that he has been having a cough with chills since November of last year. He states that his symptoms improve with antibiotics but a few days after completing abx the symptoms return. SOB with cough and exertion. He notes some R sided abdominal pain but denies any diarrhea. Patient denies any fevers.     He was recently seen at an outside hospital and given po abx which he has been taking (Augmentin and possibly doxycyline per chart review).     Afebrile. On room air.     History:  Past Medical History:   Diagnosis Date    Back problem     Belching 2022    Black stools 2022    Borderline diabetes     Dx in 2013 - HgA1C 6.2%    C. difficile diarrhea 10/23/2022    pt treated and without symptoms    Chest pain 2022    Coronary artery disease     On 13: CABG x 4 with LIMA to LAD and SVG to diagonal, OM and PDA    Decorative tattoo 2000    Depression     Difficult intubation     h/o esophagectomy for CA; developed esophagobronchial vistula    Disorder of liver     LIVER CA    Esophageal cancer (HCC)     completed chemo    Esophageal reflux     Essential hypertension     Exposure to medical diagnostic radiation     last tx 2022    Frequent urination 2013    Gastroparesis     Heartburn 2022    High blood pressure      High cholesterol 08/01/2013    Found when I had quadruple bypass    History of COVID-19 10/16/2022    asymptomatic - pt was dx during a hospitalization for another diagnosis. No continued symptoms    History of stomach ulcers     Hyperlipidemia     Hyperlipidemia LDL goal < 70     Indigestion 02/01/2022    Morbid obesity with BMI of 40.0-44.9, adult (HCC)     Muscle weakness     Nontoxic multinodular goiter     Dx in 8/2013: pt was told that imaging showed thyroid cysts per PCP    Pancreatic cancer (HCC)     last dose 12/4/2023 is scheduled for another round 12/27/23    Peripheral vascular disease (HCC)     pt denies    Personal history of antineoplastic chemotherapy     for esophageal cancer/completed    Personal history of antineoplastic chemotherapy 12/2023    pancreatic cancer    Problems with swallowing 02/01/2022    Pulmonary nodules     Dx in 8/2013: CT chest showed small bilateral fissural-based lung nodules less than 1 cm    S/P CABG x 4     On 8/16/13: CABG x 4 with LIMA to LAD and SVG to diagonal, OM and PDA    Shortness of breath     when coughing; no oxygen    Vomiting 02/01/2022     Past Surgical History:   Procedure Laterality Date    APPENDECTOMY      APPENDECTOMY      ARTHROSCOPY OF JOINT UNLISTED      right shoulder    CABG      On 8/16/13: CABG x 4 with LIMA to LAD and SVG to diagonal, OM and PDA    CARDIAC CATH LAB      On 8/14/2013: cardiac cath showed 3-vessel disease    OTHER SURGICAL HISTORY      1.       Laparoscopic robotic-assisted esophagogastrectomy.    PORT, INDWELLING, IMP       Family History   Problem Relation Age of Onset    Cancer Mother         breast and colon     Diabetes Neg       reports that he quit smoking about 10 years ago. His smoking use included cigarettes. He has a 27 pack-year smoking history. He has never used smokeless tobacco. He reports that he does not currently use alcohol. He reports that he does not use drugs.    Allergies:  No Known  Allergies    Medications:    Current Facility-Administered Medications:     metoclopramide (Reglan) tab 10 mg, 10 mg, Oral, TID AC and HS    piperacillin-tazobactam (Zosyn) 3.375 g in dextrose 5% 100 mL IVPB-ADDV, 3.375 g, Intravenous, Q8H    vancomycin (Vancocin) cap 125 mg, 125 mg, Oral, Daily    gabapentin (Neurontin) cap 300 mg, 300 mg, Oral, BID    losartan (Cozaar) tab 50 mg, 50 mg, Oral, Daily    metoprolol succinate ER (Toprol XL) 24 hr tab 50 mg, 50 mg, Oral, Daily Beta Blocker    pantoprazole (Protonix) DR tab 40 mg, 40 mg, Oral, QAM AC    sertraline (Zoloft) tab 200 mg, 200 mg, Oral, Daily    acetaminophen (Tylenol Extra Strength) tab 500 mg, 500 mg, Oral, Q4H PRN    melatonin tab 3 mg, 3 mg, Oral, Nightly PRN    guaiFENesin ER (Mucinex) 12 hr tab 600 mg, 600 mg, Oral, BID PRN    benzonatate (Tessalon) cap 200 mg, 200 mg, Oral, TID PRN    glycerin-hypromellose- (Artifical Tears) 0.2-0.2-1 % ophthalmic solution 1 drop, 1 drop, Both Eyes, QID PRN    sodium chloride (Saline Mist) 0.65 % nasal solution 1 spray, 1 spray, Each Nare, Q3H PRN    enoxaparin (Lovenox) 40 MG/0.4ML SUBQ injection 40 mg, 40 mg, Subcutaneous, Daily    ondansetron (Zofran) 4 MG/2ML injection 4 mg, 4 mg, Intravenous, Q6H PRN    prochlorperazine (Compazine) 10 MG/2ML injection 5 mg, 5 mg, Intravenous, Q8H PRN    polyethylene glycol (PEG 3350) (Miralax) 17 g oral packet 17 g, 17 g, Oral, Daily PRN    sennosides (Senokot) tab 17.2 mg, 17.2 mg, Oral, Nightly PRN    bisacodyl (Dulcolax) 10 MG rectal suppository 10 mg, 10 mg, Rectal, Daily PRN    fleet enema (Fleet) 7-19 GM/118ML rectal enema 133 mL, 1 enema, Rectal, Once PRN    HYDROmorphone (Dilaudid) 1 MG/ML injection 0.2 mg, 0.2 mg, Intravenous, Q2H PRN **OR** HYDROmorphone (Dilaudid) 1 MG/ML injection 0.4 mg, 0.4 mg, Intravenous, Q2H PRN **OR** HYDROmorphone (Dilaudid) 1 MG/ML injection 0.8 mg, 0.8 mg, Intravenous, Q2H PRN    pancrelipase (Lip-Prot-Amyl) (Zenpep) DR particles cap  50,000 Units, 50,000 Units, Oral, TID CC    heparin (Porcine) 100 Units/mL lock flush 500 Units, 5 mL, Intravenous, PRN    diphenhydrAMINE (Benadryl) 50 mg/mL  injection 12.5 mg, 12.5 mg, Intravenous, Nightly PRN **OR** diphenhydrAMINE (Benadryl) cap/tab 25 mg, 25 mg, Oral, Nightly PRN    Review of Systems:  CONSTITUTIONAL:  No weakness or fatigue.  HEENT:  Eyes:  No visual loss. Ears, Nose, Throat:  No hearing loss  SKIN:  No rash or itching.  CARDIOVASCULAR:  No chest pain  RESPIRATORY:  No shortness of breath, cough or sputum.  GASTROINTESTINAL:  No nausea or diarrhea. + abdominal pain  GENITOURINARY:  No Burning on urination.   NEUROLOGICAL:  No headache.  MUSCULOSKELETAL:  No back pain  HEMATOLOGIC:  No bleeding.  ALLERGIES:  No history of asthma    Physical Exam:  Vital signs: Blood pressure 138/73, pulse 54, temperature 97.9 °F (36.6 °C), temperature source Oral, resp. rate 20, height 188 cm (6' 2\"), weight 181 lb 10.5 oz (82.4 kg), SpO2 94%.    General: Alert, oriented, NAD. On room air.   HEENT: Moist mucous membranes.   Neck: No lymphadenopathy.  Supple.  Cardiovascular: RRR  Respiratory: Clear to auscultation bilaterally.  No wheezes. No rhonchi.  Abdomen: Soft, nontender, nondistended.   Musculoskeletal: Movement of all extremities.  Integument: No lesions. No erythema.    Laboratory Data:  Recent Labs   Lab 03/07/24  0549   RBC 3.36*   HGB 9.7*   HCT 30.9*   MCV 92.0   MCH 28.9   MCHC 31.4   RDW 14.3   NEPRELIM 3.86   WBC 5.2   .0*     Recent Labs   Lab 03/06/24 2023 03/07/24  0549   * 89   BUN 15 13   CREATSERUM 0.84 0.62*   CA 8.3* 8.6   ALB 2.6* 2.7*   * 136   K 3.9 3.9    104   CO2 29.0 30.0   ALKPHO 101 94   AST 19 16   ALT 24 23   BILT 0.2 0.2   TP 6.5 6.2*       Microbiology: Reviewed in EMR    Radiology: Reviewed.    PROCEDURE:  XR CHEST AP PORTABLE  (CPT=71045)     TECHNIQUE:  AP chest radiograph was obtained.     COMPARISON:  [facility] , XR, XR CHEST AP PORTABLE   (CPT=71045), 2/20/2024, 8:20 AM.     INDICATIONS:  call back for admission for pneuomina     PATIENT STATED HISTORY: (As transcribed by Technologist)    call back for admission for pneuomina      Impression:    CONCLUSION:       Stable cardiac and mediastinal contours.  Reticular nodular opacities of the mid/lower lungs bilaterally are seen to better advantage on recent CT of 03/06/2024.  No associated pleural effusion or pneumothorax.     Left IJ chest port terminates near the cavoatrial junction.     LOCATION:  [facility]     Dictated by (CST): Richie Jay MD on 3/06/2024 at 10:10 PM      Finalized by (CST): Richie Jay MD on 3/06/2024 at 10:11 PM        PROCEDURE:  CT CHEST+ABDOMEN (ALL CNTRST ONLY) (CPT=71260/29428)     COMPARISON:  [facility] , CT, CT CHEST(CONTRAST ONLY) (CPT=71260), 1/29/2024, 11:46 AM., abdominal CT 12/30/2023, PET-CT 01/04/2024     INDICATIONS:  K31.84 Gastroparesis R11.2 Nausea and vomiting, unspecified vomiting type R11.0 Nausea     TECHNIQUE:  IV contrast-enhanced scanning was performed through the chest and abdomen.  Dose reduction techniques were used. Dose information is transmitted to the ACR (American College of Radiology) NRDR (National Radiology Data Registry) which includes   the Dose Index Registry.     PATIENT STATED HISTORY:(As transcribed by Technologist)  PT states F/U      CONTRAST USED:  80cc of Isovue 370     FINDINGS:       CHEST:  LUNGS:  No central airway stenosis.  There are extensive patchy consolidative and tree-in-bud type opacities involving dependent portions of the upper lobes and lower lobes as well as portions of the middle lobe and lingula.  These appear waxing/waning  compared to 01/29/2024 with interval improvement in some areas and interval worsening and others.  For reference, a larger nodular opacity of the peripheral left lower lobe measures 1.7 x 1.4 cm (series 3, image 100), not previously seen.  MEDIASTINUM:  Few borderline caliber lymph nodes of the left  lower paratracheal station are stable.  Stable calcified nodule of the right thyroid lobe.  Postoperative changes of esophagectomy with gastric pull-through.  Enteric contrast identified within   the gastric pull-through.  No focal areas of contrast extravasation identified.  No upstream esophageal dilation to indicate stricture/obstruction.  AIDAN:  No mass or adenopathy.    CARDIAC:  Postoperative changes of CABG.  Stable mild biatrial enlargement.  No pericardial effusion.  Extensive coronary calcification.  PLEURA:  No mass or effusion.    CHEST WALL:  Left IJ chest port terminates near the cavoatrial junction.  AORTA:  No aneurysm or dissection.    VASCULATURE:  No visible pulmonary arterial thrombus or attenuation.       ABDOMEN:  LIVER:  Normal morphology.  Subtle hypodense lesions of segment 8 (series 2, image 174, 181) correspond to sites of hypermetabolism on PET-CT of 01/04/2024, highly suspicious for metastases.  The larger of these more superiorly within segment 8 measures  1.5 x 1.6 cm).  These appear generally similar in size compared to 12/30/2023.  BILIARY:  No visible dilatation or calcification.    PANCREAS:  Moderate, diffuse parenchymal atrophy.  SPLEEN:  No enlargement or focal lesion.    KIDNEYS:  No mass, obstruction, or calcification.    ADRENALS:  No enlargement or mass.    AORTA:  No aneurysm or dissection.    RETROPERITONEUM:  No adenopathy or mass.    BOWEL/MESENTERY:  No visible mass, obstruction, or bowel wall thickening.  Stable focus of omental fat necrosis of the right mid abdomen.  ABDOMINAL WALL:  No mass or hernia.    BONES:  No bony lesion or fracture.       Impression  CONCLUSION:       1. Extensive patchy consolidative and tree-in-bud type opacities throughout the lungs with some areas improved and others worse compared to 01/29/2024.  Findings may reflect aspiration pneumonia or other inflammatory process.  Given the solid nodular  components, metastatic disease cannot be  excluded.  Recommend continued attention on follow-up.     2. Hypoattenuating lesions of hepatic segment 8 correspond to areas of FDG avidity on PET-CT of 01/04/2024 most consistent with metastases, generally stable in size in the interim.     3. No definite CT correlate for focus of peripancreatic hypermetabolism noted on PET-CT of 01/04/2024.  No new sites of disease identified within the abdomen.       LOCATION:  [facility]     Dictated by (CST): Richie Jay MD on 3/06/2024 at 5:14 PM      Finalized by (CST): Richie Jay MD on 3/06/2024 at 5:30 PM       ASSESSMENT:  Recurrent aspiration pneumonia/pneumonitis.   Has been on Augmentin  CT 3/6 with extensive patchy consolidation and tree in bud type opacities with improvement in some areas and worsening in the other  Bronchoesophageal fistula  S/p esophageal stenting (removed 12/12/23) and endobronchial stenting at [facility] (removed now). S/p ASD occluder device placement 1/17/24. S/p bronch with endobronchial biopsies of R main stem endobronchial lesion and EGD 2/20. 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 On chemo.  HLD, CAD  History of c. diff    PLAN:  - continue IV Zosyn  - check sputum culture if able  - Dr. Ruiz d/w with GI--> will order NM GI gastric emptying study  - follow blood cultures  - follow temps and wbc  - continue ppx po vancomycin in view of c. Diff history  - pulm eval appreciated  - reviewed labs, micro, imaging reports, available old records    Discussed case with Dr. Ruiz, patient and patient's wife.     Thank you for allowing us to participate in the care of this patient. Please do not hesitate to call if you have any questions.   We will continue to follow with you and will make further recommendations based on his progress.    [facility]  [phone]  3/8/2024    ID ATTENDING ADDENDUM     Pt seen an examined  independently. Chart reviewed. Agree with above. Note has been reviewed by me and modified as needed.  Exam and Impression/ Recs as noted above.  Pt with recurrent/ worsening pna. Suspect related to recurrent aspiration rather than progressive/ subacute infection- d/w Marshall Madrid and Erma, they agree that he is likely experiencing recurrent aspiration  Will follow  D/w staff and with pt/ wife. D/w Marshall Madrid and Erma  More than 50% of clinical time and 100% of the clinical decision making performed by me.    Heather Ruiz MD

## 2024-03-09 ENCOUNTER — APPOINTMENT (OUTPATIENT)
Dept: NUCLEAR MEDICINE | Facility: HOSPITAL | Age: 54
End: 2024-03-09
Attending: INTERNAL MEDICINE
Payer: COMMERCIAL

## 2024-03-09 VITALS
SYSTOLIC BLOOD PRESSURE: 160 MMHG | WEIGHT: 181.69 LBS | BODY MASS INDEX: 23.32 KG/M2 | TEMPERATURE: 98 F | OXYGEN SATURATION: 98 % | RESPIRATION RATE: 16 BRPM | HEART RATE: 63 BPM | DIASTOLIC BLOOD PRESSURE: 87 MMHG | HEIGHT: 74 IN

## 2024-03-09 LAB
ADENOVIRUS F 40/41 PCR: NEGATIVE
ALBUMIN SERPL-MCNC: 2.8 G/DL (ref 3.4–5)
ALBUMIN/GLOB SERPL: 0.7 {RATIO} (ref 1–2)
ALP LIVER SERPL-CCNC: 101 U/L
ALT SERPL-CCNC: 21 U/L
ANION GAP SERPL CALC-SCNC: 2 MMOL/L (ref 0–18)
AST SERPL-CCNC: 16 U/L (ref 15–37)
ASTROVIRUS PCR: NEGATIVE
BASOPHILS # BLD AUTO: 0.04 X10(3) UL (ref 0–0.2)
BASOPHILS NFR BLD AUTO: 1 %
BILIRUB SERPL-MCNC: 0.4 MG/DL (ref 0.1–2)
BUN BLD-MCNC: 9 MG/DL (ref 9–23)
C CAYETANENSIS DNA SPEC QL NAA+PROBE: NEGATIVE
CALCIUM BLD-MCNC: 8.3 MG/DL (ref 8.5–10.1)
CAMPY SP DNA.DIARRHEA STL QL NAA+PROBE: NEGATIVE
CHLORIDE SERPL-SCNC: 111 MMOL/L (ref 98–112)
CO2 SERPL-SCNC: 24 MMOL/L (ref 21–32)
CREAT BLD-MCNC: 0.31 MG/DL
CRYPTOSP DNA SPEC QL NAA+PROBE: NEGATIVE
EAEC PAA PLAS AGGR+AATA ST NAA+NON-PRB: NEGATIVE
EC STX1+STX2 + H7 FLIC SPEC NAA+PROBE: NEGATIVE
EGFRCR SERPLBLD CKD-EPI 2021: 140 ML/MIN/1.73M2 (ref 60–?)
ENTAMOEBA HISTOLYTICA PCR: NEGATIVE
EOSINOPHIL # BLD AUTO: 0.13 X10(3) UL (ref 0–0.7)
EOSINOPHIL NFR BLD AUTO: 3.2 %
EPEC EAE GENE STL QL NAA+NON-PROBE: NEGATIVE
ERYTHROCYTE [DISTWIDTH] IN BLOOD BY AUTOMATED COUNT: 14.9 %
ETEC LTA+ST1A+ST1B TOX ST NAA+NON-PROBE: NEGATIVE
FOLATE SERPL-MCNC: 25.9 NG/ML (ref 8.7–?)
GIARDIA LAMBLIA PCR: NEGATIVE
GLOBULIN PLAS-MCNC: 4 G/DL (ref 2.8–4.4)
GLUCOSE BLD-MCNC: 98 MG/DL (ref 70–99)
HCT VFR BLD AUTO: 38.6 %
HGB BLD-MCNC: 12.3 G/DL
IMM GRANULOCYTES # BLD AUTO: 0.04 X10(3) UL (ref 0–1)
IMM GRANULOCYTES NFR BLD: 1 %
LYMPHOCYTES # BLD AUTO: 0.87 X10(3) UL (ref 1–4)
LYMPHOCYTES NFR BLD AUTO: 21.1 %
MCH RBC QN AUTO: 29 PG (ref 26–34)
MCHC RBC AUTO-ENTMCNC: 31.9 G/DL (ref 31–37)
MCV RBC AUTO: 91 FL
MONOCYTES # BLD AUTO: 0.62 X10(3) UL (ref 0.1–1)
MONOCYTES NFR BLD AUTO: 15 %
NEUTROPHILS # BLD AUTO: 2.42 X10 (3) UL (ref 1.5–7.7)
NEUTROPHILS # BLD AUTO: 2.42 X10(3) UL (ref 1.5–7.7)
NEUTROPHILS NFR BLD AUTO: 58.7 %
NOROVIRUS GI/GII PCR: NEGATIVE
OSMOLALITY SERPL CALC.SUM OF ELEC: 283 MOSM/KG (ref 275–295)
P SHIGELLOIDES DNA STL QL NAA+PROBE: NEGATIVE
PLATELET # BLD AUTO: 178 10(3)UL (ref 150–450)
POTASSIUM SERPL-SCNC: 3.9 MMOL/L (ref 3.5–5.1)
PROCALCITONIN SERPL-MCNC: 0.11 NG/ML (ref ?–0.16)
PROT SERPL-MCNC: 6.8 G/DL (ref 6.4–8.2)
RBC # BLD AUTO: 4.24 X10(6)UL
ROTAVIRUS A PCR: NEGATIVE
SALMONELLA DNA SPEC QL NAA+PROBE: NEGATIVE
SAPOVIRUS PCR: NEGATIVE
SHIGELLA SP+EIEC IPAH ST NAA+NON-PROBE: NEGATIVE
SODIUM SERPL-SCNC: 137 MMOL/L (ref 136–145)
V CHOLERAE DNA SPEC QL NAA+PROBE: NEGATIVE
VIBRIO DNA SPEC NAA+PROBE: NEGATIVE
VIT B12 SERPL-MCNC: 581 PG/ML (ref 193–986)
WBC # BLD AUTO: 4.1 X10(3) UL (ref 4–11)
YERSINIA DNA SPEC NAA+PROBE: NEGATIVE

## 2024-03-09 PROCEDURE — 99239 HOSP IP/OBS DSCHRG MGMT >30: CPT | Performed by: INTERNAL MEDICINE

## 2024-03-09 PROCEDURE — 99233 SBSQ HOSP IP/OBS HIGH 50: CPT | Performed by: INTERNAL MEDICINE

## 2024-03-09 PROCEDURE — 78264 GASTRIC EMPTYING IMG STUDY: CPT | Performed by: INTERNAL MEDICINE

## 2024-03-09 RX ORDER — METRONIDAZOLE 500 MG/1
500 TABLET ORAL 3 TIMES DAILY
Qty: 30 TABLET | Refills: 0 | Status: SHIPPED | OUTPATIENT
Start: 2024-03-09 | End: 2024-03-13

## 2024-03-09 RX ORDER — CEFADROXIL 500 MG/1
500 CAPSULE ORAL 2 TIMES DAILY
Qty: 20 CAPSULE | Refills: 0 | Status: SHIPPED | OUTPATIENT
Start: 2024-03-09 | End: 2024-03-13

## 2024-03-09 RX ORDER — DIPHENHYDRAMINE HCL 25 MG
25 CAPSULE ORAL EVERY 6 HOURS PRN
Status: DISCONTINUED | OUTPATIENT
Start: 2024-03-09 | End: 2024-03-09

## 2024-03-09 RX ORDER — DIPHENHYDRAMINE HYDROCHLORIDE 50 MG/ML
12.5 INJECTION INTRAMUSCULAR; INTRAVENOUS NIGHTLY PRN
Status: DISCONTINUED | OUTPATIENT
Start: 2024-03-09 | End: 2024-03-09

## 2024-03-09 NOTE — PROGRESS NOTES
Gastroenterology Progress Note  Patient Name: Dontae Cortez Jr.  Chief Complaint: Abdominal pain  S: The patient reports no abdominal pain today.  He wants to go home.  He is eating.  No n/v.  No chest pain or cough today.  O: /87 (BP Location: Right arm)   Pulse 63   Temp 97.5 °F (36.4 °C) (Temporal)   Resp 16   Ht 6' 2\" (1.88 m)   Wt 181 lb 10.5 oz (82.4 kg)   SpO2 98%   BMI 23.32 kg/m²   Gen: AAOx3  CV: RRR with normal S1 / S2  Resp: CTA bilaterally  Abd: (+)BS, soft, non-tender, non-distended; no rebound or guarding  Ext: No edema or cyanosis  Skin: Warm and dry  Laboratory Data:   Lab Results   Component Value Date    WBC 4.1 03/09/2024    HGB 12.3 03/09/2024    HCT 38.6 03/09/2024    .0 03/09/2024    CREATSERUM 0.31 03/09/2024    BUN 9 03/09/2024     03/09/2024    K 3.9 03/09/2024     03/09/2024    CO2 24.0 03/09/2024    GLU 98 03/09/2024    CA 8.3 03/09/2024    ALB 2.8 03/09/2024    ALKPHO 101 03/09/2024    BILT 0.4 03/09/2024    AST 16 03/09/2024    ALT 21 03/09/2024     Recent Labs   Lab 03/06/24 2023 03/07/24  0549 03/09/24  0604   * 89 98   BUN 15 13 9   CREATSERUM 0.84 0.62* 0.31*   CA 8.3* 8.6 8.3*   * 136 137   K 3.9 3.9 3.9    104 111   CO2 29.0 30.0 24.0     Recent Labs   Lab 03/09/24  0604   RBC 4.24*   HGB 12.3*   HCT 38.6*   MCV 91.0   MCH 29.0   MCHC 31.9   RDW 14.9   NEPRELIM 2.42   WBC 4.1   .0       Recent Labs   Lab 03/06/24 2023 03/07/24  0549 03/09/24  0604   ALT 24 23 21   AST 19 16 16     Procedure       Date/Time   NM GI GASTRIC EMPTYING STUDY (CPT=78264) [[id number]] Collected: 03/09/24 1325   Order Status: Completed Updated: 03/09/24 1326   Narrative:     PROCEDURE:  NM GI GASTRIC EMPTYING STUDY  (CPT=78264)     COMPARISON:  None.     INDICATIONS:  suspected aspiration.  Gastroparesis.     TECHNIQUE:  Tc-99m sulfur colloid was microwaved with 2 egg equivalent Eggbeater(tm), and fed to the patient with a standard 300 kcal  meal including one slice of bread, a pat of butter, and 8 oz water for volume.  Static anterior and posterior immediate,   1 hour, 2 hour and 4 hour images were obtained. Decay-corrected gastric emptying values were derived utilizing geometric mean values from the anterior and posterior projections.     PHARMACEUTICAL:  Tc-99m Sulfur Colloid 1 uCi     FINDINGS:    .     1 Hour:   29 %   (Normal = 10% - 70%)  2 Hour:   58 %   (Normal = > 40%)  4 Hour:  99 %   (Normal = > 90%)     OTHER:    None.                   Impression:     CONCLUSION:  Normal gastric emptying        LOCATION:  [facility]        Dictated by (CST): Shar Simms MD on 3/09/2024 at 1:24 PM      Finalized by (CST): Shar Simms MD on 3/09/2024 at 1:25 PM         Assessment: This is a patient with esophageal Ca and s/p esophagectomy.  The patient's course has been complicated by a bronchoesophageal fistula, which has required bronchial stenting as well as anastomotic endoscopic stenting.  He has now been admitted repeatedly related to recurrent aspiration pneumonitis.  There has been no evidence of an ongoing bronchoesophageal fistula.  Gastric emptying scan was normal.  So, this may likely be a mechanical process of refluxate in new anatomy.    Plan:   1) Discharge home today as he is back to baseline  2) Continue current PPI therapy - However, I wonder if a change to Vonaprazan may be another choice for him from the standpoint of reduction of gastric secretory volume.  3) Follow-up with Dr. Ritchie to discuss the above.

## 2024-03-09 NOTE — PROGRESS NOTES
[facility]   part of [facility]     Hospitalist Progress Note     Dontae Buddy Cortez . Patient Status:  Inpatient    10/15/1970 MRN [id number]   Location [facility] 4NW-A Attending Sakina Hernandez MD   Hosp Day # 2 PCP Adrian Horowitz MD     Chief Complaint: Shortness of breath, cough    Subjective:     Brought up some phlegm overnight as reported by patient.  Shortness of breath and cough improving.  Afebrile.  On room air.  Has chronic right-sided upper and mid abdominal pain which is same as before  Eager to go home today    Objective:    Review of Systems:   A comprehensive review of systems was completed; pertinent positive and negatives stated in subjective.    Vital signs:  Temp:  [97.5 °F (36.4 °C)-98.1 °F (36.7 °C)] 97.5 °F (36.4 °C)  Pulse:  [52-63] 63  Resp:  [16-20] 16  BP: (108-160)/(56-87) 160/87  SpO2:  [95 %-98 %] 98 %    Physical Exam:    /87 (BP Location: Right arm)   Pulse 63   Temp 97.5 °F (36.4 °C) (Temporal)   Resp 16   Ht 6' 2\" (1.88 m)   Wt 181 lb 10.5 oz (82.4 kg)   SpO2 98%   BMI 23.32 kg/m²     General: No acute distress  Respiratory: Clear to auscultation bilateral at this time, no wheezes, no rhonchi  Cardiovascular: S1, S2, regular rate and rhythm  Abdomen: Soft, Non-tender, non-distended, positive bowel sounds  Neuro: Awake alert, no new focal deficits.   Extremities: No pedal edema  No calf tenderness    Diagnostic Data:    Labs:  Recent Labs   Lab 24  0549 24  0604   WBC 6.1 5.2 4.1   HGB 10.0* 9.7* 12.3*   MCV 88.7 92.0 91.0   .0* 128.0* 178.0       Recent Labs   Lab 24  0549 24  0604   * 89 98   BUN 15 13 9   CREATSERUM 0.84 0.62* 0.31*   CA 8.3* 8.6 8.3*   ALB 2.6* 2.7* 2.8*   * 136 137   K 3.9 3.9 3.9    104 111   CO2 29.0 30.0 24.0   ALKPHO 101 94 101   AST 19 16 16   ALT 24 23 21   BILT 0.2 0.2 0.4   TP 6.5 6.2* 6.8       Estimated Creatinine Clearance: 316.7 mL/min (A)  (based on SCr of 0.31 mg/dL (L)).    No results for input(s): \"TROP\", \"TROPHS\", \"CK\" in the last 168 hours.    No results for input(s): \"PTP\", \"INR\" in the last 168 hours.               Microbiology    Hospital Encounter on 03/06/24   1. Blood Culture     Status: None (Preliminary result)    Collection Time: 03/06/24  9:28 PM    Specimen: Blood,peripheral   Result Value Ref Range    Blood Culture Result No Growth 2 Days N/A         Imaging: Reviewed in Epic.  Chest x-ray done on 3/6/2024 showed  Stable cardiac and mediastinal contours.  Reticular nodular opacities of the mid/lower lungs bilaterally are seen to better advantage on recent CT of 03/06/2024.  No associated pleural effusion or pneumothorax.      Left IJ chest port terminates near the cavoatrial junction.     Medications:    [Held by provider] metoclopramide  10 mg Oral TID AC and HS    piperacillin-tazobactam  3.375 g Intravenous Q8H    vancomycin  125 mg Oral Daily    gabapentin  300 mg Oral BID    losartan  50 mg Oral Daily    metoprolol succinate ER  50 mg Oral Daily Beta Blocker    pantoprazole  40 mg Oral QAM AC    sertraline  200 mg Oral Daily    enoxaparin  40 mg Subcutaneous Daily    lipase-protease-amylase (Lip-Prot-Amyl)  50,000 Units Oral TID CC       Assessment & Plan:      #Aspiration pneumonitis -recurrent episodes  -Chest x-ray done on 3/6/2024 showed Stable cardiac and mediastinal contours.  Reticular nodular opacities of the mid/lower lungs bilaterally are seen to better advantage on recent CT of 03/06/2024.  No associated pleural effusion or pneumothorax.    -CT chest done on 3/6/2024 showed Extensive patchy consolidative and tree-in-bud type opacities throughout the lungs with some areas improved and others worse compared to 01/29/2024.  Findings may reflect aspiration pneumonia or other inflammatory process.  Given the solid nodular   components, metastatic disease cannot be excluded- had discussed with pulmonary Dr. Brooks regarding  this.  -Blood cultures with no growth at 1 day  -Sputum culture ordered could not be done so far as patient not bringing up much phlegm  -Urine streptococcal antigen negative  -Urine antigen ordered  -Continue IV antibiotics  -Recently took course of oral antibiotics as outpatient for last week with no improvement according to patient.  -ID consult for recurrent episodes     #History of C. difficile in December 2023  -Oral vancomycin prophylaxis while on antibiotics     #Hypertension  -Losartan, metoprolol     #CAD s/p CABG  -Continue home metoprolol     #Hyperlipidemia     #Metastatic esophageal cancer s/p resection and gastroesophagostomy   #Metastatic pancreatic cancer  # Possible Mets to the liver  # Right-sided upper and mid abdominal pain due to above  -CT chest abdomen done on 3/6/2024 and also showed hypoattenuating lesions of hepatic segment 8 correspond to areas of FDG avidity on PET-CT of 01/04/2024 most consistent with metastases, generally stable in size in the interim. No visible mass, obstruction, or bowel wall thickening.  Stable focus of omental fat necrosis of the right mid abdomen.   -Patient follows up with oncology Dr. Foster as outpatient, on outpatient chemotherapy which was restarted on 2/5/2024 for progression of disease per oncology note .  Last chemo received on 2/22/2024 per chart review.  -Was on outpatient immunotherapy  -Follows up with GI Dr. Ritchie as outpatient  -On chart review, patient had EGD bronchoscopy on 2/20/2024 with removal of moderate amount of mucus near the ASD occluder device, friable mucosa with small ulcer in the proximal bronchus and removal of the right mainstem lesion at that time and pathology of that lesion revealed inflamed edematous bronchial wall tissue with squamous metaplasia and congested blood vessels, few bacteria on surface, negative for malignant cells at that time, fungal stain negative at that time.  -Pain management  -Gastric emptying study being  done today, results pending     Discussed with patient.  Discussed with RN.    Discharge planning possibly today once cleared by all specialist including pulmonary, ID, GI and oncology and follow-up with them as outpatient as directed.  Follow-up with regular outpatient primary care physician Adrian Horowitz MD within 1 week in office    Sakina Hernandez MD    Supplementary Documentation:     Quality:  DVT Mechanical Prophylaxis:   SCDs,    DVT Pharmacologic Prophylaxis   Medication    enoxaparin (Lovenox) 40 MG/0.4ML SUBQ injection 40 mg    heparin (Porcine) 100 Units/mL lock flush 500 Units                Code Status: Full Code  Neumann: No urinary catheter in place  Neumann Duration (in days):   Central line:    SHUBHAM:     Discharge is dependent on: Clinical progress, pulmonary and ID clearance  At this point Mr. Cortez is expected to be discharge to: Home    The 21st Century Cures Act makes medical notes like these available to patients in the interest of transparency. Please be advised this is a medical document. Medical documents are intended to carry relevant information, facts as evident, and the clinical opinion of the practitioner. The medical note is intended as peer to peer communication and may appear blunt or direct. It is written in medical language and may contain abbreviations or verbiage that are unfamiliar.

## 2024-03-09 NOTE — DISCHARGE SUMMARY
[facility]  Discharge Summary    Dontae Cortez Jr. Patient Status:  Inpatient    10/15/1970 MRN [id number]   Location [facility] 4NW-A Attending No att. providers found   Hosp Day # 2 PCP Adrian Horowitz MD     Date of Admission: 3/6/2024    Date of Discharge: 3/9/2024      Disposition: Home or Self Care    Discharge Diagnosis:   #Aspiration pneumonitis   #History of C. difficile in 2023  #Hypertension  #CAD s/p CABG  #Hyperlipidemia  #Metastatic esophageal cancer s/p resection and gastroesophagostomy   #Metastatic pancreatic cancer  # Possible Mets to the liver  # Right-sided upper and mid abdominal pain due to above      Chief Complaint:   Chief Complaint   Patient presents with    Difficulty Breathing       History of Present Illness:   Dontae Cortez Jr. is a 53 year old male with past medical history of CAD, pancreatic cancer, esophageal cancer, hyperlipidemia, hypertension who presents ED for dyspnea.  Patient has had productive cough with purulent sputum and has been dyspneic for the past few days.  He has had chills but no fever.  He had chemo on Monday.  He was sent to outside ER a week ago and diagnosed with aspiration pneumonia.  He was treated with Augmentin and doxycycline but he reports no improvement afterwards.  He has history of C. difficile and often gets diarrhea frequently.  He had CT of abdomen done per GI doctor today who told him he had pneumonia and needed to be admitted.   Please refer recent physical done by Dr. Clark Myles for details of admission    Brief Synopsis:     -Chest x-ray done on 3/6/2024 showed Stable cardiac and mediastinal contours.  Reticular nodular opacities of the mid/lower lungs bilaterally are seen to better advantage on recent CT of 2024.  No associated pleural effusion or pneumothorax.    -CT chest done on 3/6/2024 showed Extensive patchy consolidative and tree-in-bud type opacities throughout the lungs with some areas improved and  others worse compared to 01/29/2024.  Findings may reflect aspiration pneumonia or other inflammatory process.  Given the solid nodular   components, metastatic disease cannot be excluded- had discussed with pulmonary Dr. Brooks regarding this.  -Blood cultures with no growth at 1 day  -Sputum culture ordered could not be done so far as patient not bringing up much phlegm  -Urine streptococcal antigen negative  -Treated with IV antibiotics  -Recently took course of oral antibiotics as outpatient for last week with no improvement according to patient.  -Seen by ID consult for recurrent episodes     #History of C. difficile in December 2023  -Oral vancomycin prophylaxis given while on antibiotics     #Hypertension  -Continued on losartan, metoprolol     #CAD s/p CABG  -Continued on home metoprolol     #Hyperlipidemia     #Metastatic esophageal cancer s/p resection and gastroesophagostomy   #Metastatic pancreatic cancer  # Possible Mets to the liver  # Right-sided upper and mid abdominal pain due to above  -CT chest abdomen done on 3/6/2024 and also showed hypoattenuating lesions of hepatic segment 8 correspond to areas of FDG avidity on PET-CT of 01/04/2024 most consistent with metastases, generally stable in size in the interim. No visible mass, obstruction, or bowel wall thickening.  Stable focus of omental fat necrosis of the right mid abdomen.   -Patient follows up with oncology Dr. Foster as outpatient, on outpatient chemotherapy which was restarted on 2/5/2024 for progression of disease per oncology note .  Last chemo received on 2/22/2024 per chart review.  -Was on outpatient immunotherapy  -Follows up with GI Dr. Ritchie as outpatient  -On chart review, patient had EGD bronchoscopy on 2/20/2024 with removal of moderate amount of mucus near the ASD occluder device, friable mucosa with small ulcer in the proximal bronchus and removal of the right mainstem lesion at that time and pathology of that lesion revealed  inflamed edematous bronchial wall tissue with squamous metaplasia and congested blood vessels, few bacteria on surface, negative for malignant cells at that time, fungal stain negative at that time.  -Pain management  -Gastric emptying study done today came back normal     Patient improved clinically.  Discharged home in stable condition on oral antibiotics per ID once cleared by all specialist including pulmonary, ID, GI and oncology and follow-up with them as outpatient as directed.  Follow-up with regular outpatient primary care physician Adrian Horowitz MD within 1 week in office     TCM Diagnosis at discharge from Hospital: Other: See above; still recommend for TCM follow-up    Lace+ Score: 80  59-90 High Risk  29-58 Medium Risk  0-28   Low Risk.     TCM Follow-Up Recommendation:Dontae Cortez Jr.   LACE > 58: High Risk of readmission after discharge from the hospital.      PCP: Adrian Horowitz MD    Consultations:   Consultants         Provider   Role Specialty     Giuseppe Sierra DO  Consulting Physician GASTROENTEROLOGY     Heather Ruiz MD  Consulting Physician INFECTIOUS DISEASES     Senia Foster MD  Consulting Physician Hematology and Oncology     Dusty Brooks MD  Consulting Physician PULMONARY DISEASES       Procedures during hospitalization:   None    Incidental or significant findings and recommendations (brief descriptions):  None    Lab/Test results pending at Discharge:   None      Discharge Medications:        Discharge Medications        START taking these medications        Instructions Prescription details   cefadroxil 500 MG Caps  Commonly known as: DURICEF      Take 1 capsule (500 mg total) by mouth 2 (two) times daily for 10 days.   Stop taking on: March 19, 2024  Quantity: 20 capsule  Refills: 0     metRONIDAZOLE 500 MG Tabs  Commonly known as: Flagyl      Take 1 tablet (500 mg total) by mouth 3 (three) times daily for 10 days.   Stop taking on: March 19, 2024  Quantity: 30  tablet  Refills: 0            CONTINUE taking these medications        Instructions Prescription details   baclofen 10 MG Tabs  Commonly known as: Lioresal      Take 1 tablet (10 mg total) by mouth 3 (three) times daily as needed.   Quantity: 270 tablet  Refills: 0     benzonatate 100 MG Caps  Commonly known as: Tessalon      Take 1 capsule (100 mg total) by mouth 3 (three) times daily as needed for cough.   Quantity: 90 capsule  Refills: 0     Creon 95287-14370 units Cpep  Generic drug: Pancrelipase (Lip-Prot-Amyl)      Take 2 capsules with meals and 1 capsule with snacks   Quantity: 240 capsule  Refills: 0     gabapentin 300 MG Caps  Commonly known as: Neurontin      Take 1 capsule (300 mg total) by mouth in the morning and 1 capsule (300 mg total) before bedtime.   Quantity: 180 capsule  Refills: 0     guaiFENesin-codeine 100-10 MG/5ML Soln  Commonly known as: Robitussin AC      Take 5 mL by mouth every 6 (six) hours as needed for cough.   Quantity: 237 mL  Refills: 1     HYDROcodone-acetaminophen 5-325 MG Tabs  Commonly known as: Norco      Take 1 tablet by mouth every 4 (four) hours as needed for Pain.   Quantity: 60 tablet  Refills: 0     losartan 50 MG Tabs  Commonly known as: Cozaar      Take 1 tablet (50 mg total) by mouth daily.   Quantity: 90 tablet  Refills: 2     metoclopramide 10 MG Tabs  Commonly known as: Reglan      Take 1 tablet (10 mg total) by mouth 4 (four) times daily before meals and nightly.   Stop taking on: Alanna 3, 2024  Quantity: 120 tablet  Refills: 2     metoprolol succinate ER 50 MG Tb24  Commonly known as: Toprol XL      TAKE 1 TABLET BY MOUTH EVERY DAY   Quantity: 90 tablet  Refills: 1     morphINE ER 15 MG Tbcr  Commonly known as: MS Contin      Take 1 tablet (15 mg total) by mouth every 12 (twelve) hours.   Stop taking on: March 14, 2024  Quantity: 60 tablet  Refills: 0     Omeprazole 40 MG Cpdr      Take 1 capsule (40 mg total) by mouth 2 (two) times daily before meals.   Quantity:  90 capsule  Refills: 3     ondansetron 8 MG tablet  Commonly known as: Zofran      Take 1 tablet (8 mg total) by mouth every 8 (eight) hours as needed for Nausea.   Quantity: 30 tablet  Refills: 3     sertraline 100 MG Tabs  Commonly known as: Zoloft      Take 2 tablets (200 mg total) by mouth daily.   Refills: 0            STOP taking these medications      sucralfate 1 g Tabs  Commonly known as: Carafate                  Where to Get Your Medications        These medications were sent to [facility]/pharmacy #[id number] - [place], [place] - [address] [phone], [phone]  [address] [facility] [address]      Phone: [phone]   cefadroxil 500 MG Caps       Please  your prescriptions at the location directed by your doctor or nurse    Bring a paper prescription for each of these medications  metRONIDAZOLE 500 MG Tabs          [place] prescription monitoring program data reviewed in epic before prescribing narcotics/controlled substances: Yes    Follow up Visits: Follow-up with Adrian Horowitz MD in 1 week    Adrian Horowitz MD  [address]  [facility] [address] [phone]    Schedule an appointment as soon as possible for a visit in 1 week(s)      Raheel Ritchie MD  [address] [facility] [id number] [phone]    Follow up      Senia Foster MD  [address]  [facility] [address] [phone]    Schedule an appointment as soon as possible for a visit      Heather Ruiz MD  [address]  [facility] [address] [phone]    Follow up      Appointments for Next 30 Days 3/9/2024 - 4/8/2024        Date Arrival Time Visit Type Length Department Provider     3/12/2024  9:30 AM  CHEMOTHERAPY [2076] 60 min [facility] in [place] PF [place] RN1    Patient Instructions:         Location Instructions:     Your appointment is scheduled at the [facility] in [place]. The address is [address] [address]. Please park in the GREEN LOT  and enter through the CANCER CENTER ENTRANCE of BUILDING A.. Once you arrive, please register at the Cancer Center  on the second floor.  Masks are optional for all patients and visitors, unless otherwise indicated. No care partners/visitors under 18 years of age are allowed in the infusion room.               3/12/2024 10:00 AM  ON TREATMENT VISIT FOLLOW-UP [2641] 15 min [facility] in [place] Cristina Brown APRN    Patient Instructions:         Location Instructions:     **IF YOU NEED LABWORK OR AN INFUSION ALONG WITH YOUR APPOINTMENT, YOU MUST CALL TO SCHEDULE.**  Your appointment is scheduled at the [facility] in [place]. The address is [address] [place] [address]. Please park in the GREEN LOT and enter through the CANCER CENTER ENTRANCE of BUILDING A. Once you arrive, please register at the Cancer [place]  on the second floor.  Masks are optional for all patients and visitors, unless otherwise indicated.               3/12/2024 10:30 AM  PALLIATIVE CARE FOLLOW UP [2645] 30 min [facility] in [place] Ana Luisa Bravo APRN    Patient Instructions:         Location Instructions:     **IF YOU NEED LABWORK OR AN INFUSION ALONG WITH YOUR APPOINTMENT, YOU MUST CALL TO SCHEDULE.**  Your appointment is scheduled at the [facility] in [place]. The address is [address] [place] [address]. Please park in the GREEN LOT and enter through the CANCER CENTER ENTRANCE of BUILDING A. Once you arrive, please register at the Cancer Center  on the second floor.  Masks are optional for all patients and visitors, unless otherwise indicated.                          Physical Exam:  /87 (BP Location: Right arm)   Pulse 63   Temp 97.5 °F (36.4 °C) (Temporal)   Resp 16   Ht 6' 2\" (1.88 m)   Wt 181 lb 10.5 oz (82.4 kg)   SpO2 98%   BMI 23.32 kg/m²     General: No acute distress  Respiratory: Clear to auscultation bilateral at this  time, no wheezes, no rhonchi  Cardiovascular: S1, S2, regular rate and rhythm  Abdomen: Soft, Non-tender, non-distended, positive bowel sounds  Neuro: Awake alert, no new focal deficits.   Extremities: No pedal edema  No calf tenderness    Discharge Condition: stable    Patient Discharge Instructions: See electronic chart      More than 30 minutes on discharge    Sakina Hernandez MD  3/9/2024

## 2024-03-09 NOTE — PLAN OF CARE
Pt Aox4. VSS. RA.  Up independent.   NM Gastric empting study completed.   Diet resumed.   Reported abdominal pain. Dilaudid 0.8mg administered PRN.         NURSING DISCHARGE NOTE    Discharged Home via Wheelchair.  Accompanied by Family member  Belongings Taken by patient/family.  Discharge teaching provided and verbalized understanding.         Problem: PAIN - ADULT  Goal: Verbalizes/displays adequate comfort level or patient's stated pain goal  Description: INTERVENTIONS:  - Encourage pt to monitor pain and request assistance  - Assess pain using appropriate pain scale  - Administer analgesics based on type and severity of pain and evaluate response  - Implement non-pharmacological measures as appropriate and evaluate response  - Consider cultural and social influences on pain and pain management  - Manage/alleviate anxiety  - Utilize distraction and/or relaxation techniques  - Monitor for opioid side effects  - Notify MD/LIP if interventions unsuccessful or patient reports new pain  - Anticipate increased pain with activity and pre-medicate as appropriate  Outcome: Progressing

## 2024-03-09 NOTE — PROGRESS NOTES
[facility]  Pulmonary/Critical Care Consult note    Dontae Buddy Ortegacristina Arechiga. Patient Status:  Inpatient    10/15/1970 MRN [id number]   Location [facility] 4NW-A Attending Sakina Hernandez MD   Hosp Day # 2 PCP Adrian Horowitz MD          History of Present Illness:     Feels much better.  Denies cough or shortness of breath or chest pains or abdominal pains.      History:  Past Medical History:   Diagnosis Date    Back problem     Belching 2022    Black stools 2022    Borderline diabetes     Dx in 2013 - HgA1C 6.2%    C. difficile diarrhea 10/23/2022    pt treated and without symptoms    Chest pain 2022    Coronary artery disease     On 13: CABG x 4 with LIMA to LAD and SVG to diagonal, OM and PDA    Decorative tattoo 2000    Depression     Difficult intubation     h/o esophagectomy for CA; developed esophagobronchial vistula    Disorder of liver     LIVER CA    Esophageal cancer (HCC)     completed chemo    Esophageal reflux     Essential hypertension     Exposure to medical diagnostic radiation     last tx 2022    Frequent urination 2013    Gastroparesis     Heartburn 2022    High blood pressure     High cholesterol 2013    Found when I had quadruple bypass    History of COVID-19 10/16/2022    asymptomatic - pt was dx during a hospitalization for another diagnosis. No continued symptoms    History of stomach ulcers     Hyperlipidemia     Hyperlipidemia LDL goal < 70     Indigestion 2022    Morbid obesity with BMI of 40.0-44.9, adult (HCC)     Muscle weakness     Nontoxic multinodular goiter     Dx in 2013: pt was told that imaging showed thyroid cysts per PCP    Pancreatic cancer (HCC)     last dose 2023 is scheduled for another round 23    Peripheral vascular disease (HCC)     pt denies    Personal history of antineoplastic chemotherapy     for esophageal cancer/completed    Personal history of antineoplastic chemotherapy 2023     pancreatic cancer    Problems with swallowing 02/01/2022    Pulmonary nodules     Dx in 8/2013: CT chest showed small bilateral fissural-based lung nodules less than 1 cm    S/P CABG x 4     On 8/16/13: CABG x 4 with LIMA to LAD and SVG to diagonal, OM and PDA    Shortness of breath     when coughing; no oxygen    Vomiting 02/01/2022     Past Surgical History:   Procedure Laterality Date    APPENDECTOMY      APPENDECTOMY      ARTHROSCOPY OF JOINT UNLISTED      right shoulder    CABG      On 8/16/13: CABG x 4 with LIMA to LAD and SVG to diagonal, OM and PDA    CARDIAC CATH LAB      On 8/14/2013: cardiac cath showed 3-vessel disease    OTHER SURGICAL HISTORY      1.       Laparoscopic robotic-assisted esophagogastrectomy.    PORT, INDWELLING, IMP       Family History   Problem Relation Age of Onset    Cancer Mother         breast and colon     Diabetes Neg       reports that he quit smoking about 10 years ago. His smoking use included cigarettes. He has a 27 pack-year smoking history. He has never used smokeless tobacco. He reports that he does not currently use alcohol. He reports that he does not use drugs.    Allergies:  No Known Allergies    Medications:    Current Facility-Administered Medications:     diphenhydrAMINE (Benadryl) 50 mg/mL  injection 12.5 mg, 12.5 mg, Intravenous, Nightly PRN **OR** diphenhydrAMINE (Benadryl) cap/tab 25 mg, 25 mg, Oral, Q6H PRN    [Held by provider] metoclopramide (Reglan) tab 10 mg, 10 mg, Oral, TID AC and HS    piperacillin-tazobactam (Zosyn) 3.375 g in dextrose 5% 100 mL IVPB-ADDV, 3.375 g, Intravenous, Q8H    vancomycin (Vancocin) cap 125 mg, 125 mg, Oral, Daily    gabapentin (Neurontin) cap 300 mg, 300 mg, Oral, BID    losartan (Cozaar) tab 50 mg, 50 mg, Oral, Daily    metoprolol succinate ER (Toprol XL) 24 hr tab 50 mg, 50 mg, Oral, Daily Beta Blocker    pantoprazole (Protonix) DR tab 40 mg, 40 mg, Oral, QAM AC    sertraline (Zoloft) tab 200 mg, 200 mg, Oral, Daily     acetaminophen (Tylenol Extra Strength) tab 500 mg, 500 mg, Oral, Q4H PRN    melatonin tab 3 mg, 3 mg, Oral, Nightly PRN    guaiFENesin ER (Mucinex) 12 hr tab 600 mg, 600 mg, Oral, BID PRN    benzonatate (Tessalon) cap 200 mg, 200 mg, Oral, TID PRN    glycerin-hypromellose- (Artifical Tears) 0.2-0.2-1 % ophthalmic solution 1 drop, 1 drop, Both Eyes, QID PRN    sodium chloride (Saline Mist) 0.65 % nasal solution 1 spray, 1 spray, Each Nare, Q3H PRN    enoxaparin (Lovenox) 40 MG/0.4ML SUBQ injection 40 mg, 40 mg, Subcutaneous, Daily    ondansetron (Zofran) 4 MG/2ML injection 4 mg, 4 mg, Intravenous, Q6H PRN    prochlorperazine (Compazine) 10 MG/2ML injection 5 mg, 5 mg, Intravenous, Q8H PRN    polyethylene glycol (PEG 3350) (Miralax) 17 g oral packet 17 g, 17 g, Oral, Daily PRN    sennosides (Senokot) tab 17.2 mg, 17.2 mg, Oral, Nightly PRN    bisacodyl (Dulcolax) 10 MG rectal suppository 10 mg, 10 mg, Rectal, Daily PRN    fleet enema (Fleet) 7-19 GM/118ML rectal enema 133 mL, 1 enema, Rectal, Once PRN    HYDROmorphone (Dilaudid) 1 MG/ML injection 0.2 mg, 0.2 mg, Intravenous, Q2H PRN **OR** HYDROmorphone (Dilaudid) 1 MG/ML injection 0.4 mg, 0.4 mg, Intravenous, Q2H PRN **OR** HYDROmorphone (Dilaudid) 1 MG/ML injection 0.8 mg, 0.8 mg, Intravenous, Q2H PRN    pancrelipase (Lip-Prot-Amyl) (Zenpep) DR particles cap 50,000 Units, 50,000 Units, Oral, TID CC    heparin (Porcine) 100 Units/mL lock flush 500 Units, 5 mL, Intravenous, PRN    Intake/Output:    Intake/Output Summary (Last 24 hours) at 3/9/2024 0934  Last data filed at 3/8/2024 1840  Gross per 24 hour   Intake 1311 ml   Output --   Net 1311 ml        Body mass index is 23.32 kg/m².    Review of Systems  Review of Systems:   A comprehensive 10 point review of systems was completed.  Pertinent positives and negatives noted in the the HPI.           Vitals:    03/08/24 0523 03/08/24 1334 03/08/24 2001 03/09/24 0536   BP: 138/73 130/63 108/56 125/72   BP  Location: Right arm Right arm Left arm Right arm   Pulse: 54 52 58 55   Resp: 20 16 20 18   Temp: 97.9 °F (36.6 °C) 97.7 °F (36.5 °C) 97.6 °F (36.4 °C) 98.1 °F (36.7 °C)   TempSrc: Oral Oral Oral Oral   SpO2: 94% 95% 96% 97%   Weight:       Height:             Physical Exam  Constitutional:       General: He is not in acute distress.     Appearance: Normal appearance. He is not ill-appearing or diaphoretic.   HENT:      Head: Normocephalic and atraumatic.      Nose: Nose normal. No congestion or rhinorrhea.      Mouth/Throat:      Mouth: Mucous membranes are moist.      Pharynx: Oropharynx is clear. No oropharyngeal exudate or posterior oropharyngeal erythema.   Eyes:      Extraocular Movements: Extraocular movements intact.      Pupils: Pupils are equal, round, and reactive to light.   Cardiovascular:      Rate and Rhythm: Normal rate.      Pulses: Normal pulses.      Heart sounds: Normal heart sounds. No murmur heard.  Pulmonary:      Effort: Pulmonary effort is normal. No respiratory distress.      Breath sounds: Normal breath sounds. No wheezing or rhonchi.      Comments: Diminished breath sounds in bilateral lower lung zones  Chest:      Chest wall: No tenderness.   Abdominal:      General: Abdomen is flat. Bowel sounds are normal.      Palpations: Abdomen is soft.      Comments: Mildly tender in mid abdomen   Musculoskeletal:         General: Normal range of motion.   Skin:     General: Skin is warm.   Neurological:      General: No focal deficit present.      Mental Status: He is alert and oriented to person, place, and time.   Psychiatric:         Mood and Affect: Mood normal.         Behavior: Behavior normal.         Thought Content: Thought content normal.         Judgment: Judgment normal.            Lab Data Review:  Recent Labs   Lab 03/06/24 2023 03/07/24  0549 03/09/24  0604   WBC 6.1 5.2 4.1   HGB 10.0* 9.7* 12.3*   HCT 30.7* 30.9* 38.6*   .0* 128.0* 178.0       Recent Labs   Lab  03/06/24 2023 03/07/24  0549 03/09/24  0604   * 136 137   K 3.9 3.9 3.9    104 111   CO2 29.0 30.0 24.0   BUN 15 13 9   CREATSERUM 0.84 0.62* 0.31*   CA 8.3* 8.6 8.3*   ALB 2.6* 2.7* 2.8*   ALKPHO 101 94 101   ALT 24 23 21   AST 19 16 16   * 89 98       No results for input(s): \"MG\" in the last 168 hours.    Lab Results   Component Value Date    PHOS 3.5 11/27/2023        No results for input(s): \"PT\", \"INR\", \"PTT\" in the last 168 hours.    No results for input(s): \"ABGPHT\", \"PYVGIG7Q\", \"DOWDF8N\", \"ABGHCO3\", \"ABGBE\", \"TEMP\", \"TACOS\", \"SITE\", \"DEV\", \"THGB\" in the last 168 hours.    No results for input(s): \"TROP\", \"CKMB\" in the last 168 hours.    Cultures:   Hospital Encounter on 03/06/24   1. Blood Culture     Status: None (Preliminary result)    Collection Time: 03/06/24  9:28 PM    Specimen: Blood,peripheral   Result Value Ref Range    Blood Culture Result No Growth 2 Days N/A           Radiology personally reviewed:  No results found.     Patient Active Problem List   Diagnosis    Primary hypertension    Hyperlipidemia with target low density lipoprotein (LDL) cholesterol less than 70 mg/dL    Coronary artery disease involving native coronary artery of native heart without angina pectoris    S/P CABG x 4    Nontoxic multinodular goiter    Pulmonary nodules    Thrombophlebitis leg    BRANDAN (generalized anxiety disorder)    Right shoulder Arthroscopy acromioplasty ,distal  clavicle resection, rotator cuff/labral debridment  Global 05/13/2021    Right bicipital tenosynovitis    Malignant neoplasm of esophagus (HCC)    Esophageal anastomotic leak    Esophageal obstruction    On total parenteral nutrition (TPN)    Mechanical complication of esophagostomy (HCC)    Abdominal pain of unknown etiology    Migration of esophageal stent    Gastroparesis    Esophageal carcinoma (HCC)    Normocytic anemia    Esophageal fistula    Subacute cough    Acquired bronchoesophageal fistula (HCC)    Pneumonia of both  lower lobes due to infectious organism    Malignant neoplasm of pancreas (HCC)    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 difficile colitis    Dysphagia, unspecified type    Shortness of breath    Hypokalemia    Dysphagia    Narcotic drug use    History of esophageal cancer    Palliative care by specialist    Neoplasm related pain    Endobronchial mass    Hyponatremia    Thrombocytopenia (HCC)    Acute kidney injury (HCC)    Hyperglycemia    Aspiration pneumonitis (HCC)    C. 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  Metastatic esophageal adenocarcinoma status post gastroesophagectomy on September 2022  History of pancreatic cancer  Hypertension  Hyperlipidemia  Coronary artery disease status post CABG x 4  Nontoxic multinodular goiter  Anemia  Thrombocytopenia      Plan:    DuoNebs every 6 hour as needed to help clear secretions  Continue pulmonary toilet  Complete antibiotic course.  Per ID recommendations  Advised patient to eat slowly so that he does not aspirate into the lungs  Follow-up in pulmonary office as previously scheduled  May discharge home from pulmonary standpoint  complete      Thank You for allowing me to participate in this patient's care     Dusty Brooks MD

## 2024-03-09 NOTE — PROGRESS NOTES
Patient alert x4, VSS. Afebrile. RA. All meds given per MAR. Tolerated well. Pt NPO at 0000 for nuclear med gastric emptying study tomorrow. IV abx given. Ambulates self to bathroom. Call light within reach.

## 2024-03-11 ENCOUNTER — PATIENT OUTREACH (OUTPATIENT)
Dept: CASE MANAGEMENT | Age: 54
End: 2024-03-11

## 2024-03-11 ENCOUNTER — APPOINTMENT (OUTPATIENT)
Dept: HEMATOLOGY/ONCOLOGY | Age: 54
End: 2024-03-11
Attending: INTERNAL MEDICINE
Payer: COMMERCIAL

## 2024-03-11 NOTE — PAYOR COMM NOTE
--------------  DISCHARGE REVIEW    Payor: [employer] [facility] P[employer]  Subscriber #:  [id number]  Authorization Number: [id number]    Admit date: 3/7/24  Admit time:  12:53 AM  Discharge Date: 3/9/2024  3:28 PM     Admitting Physician:   Attending Physician:  No att. providers found  Primary Care Physician: Adrian Horowitz MD          Discharge Summary Notes        Discharge Summary signed by Sakina Hernandez MD at 3/9/2024  9:37 PM       Author: Sakina Hernandez MD Specialty: HOSPITALIST, Internal Medicine Author Type: Physician    Filed: 3/9/2024  9:37 PM Date of Service: 3/9/2024  1:15 PM Status: Signed    : Sakina Hernandez MD (Physician)         [facility]  Discharge Summary    Dontae Cortez Jr. Patient Status:  Inpatient    10/15/1970 MRN [id number]   Location [facility] 4NW-A Attending No att. providers found   Hosp Day # 2 PCP Adrian Horowitz MD     Date of Admission: 3/6/2024    Date of Discharge: 3/9/2024      Disposition: Home or Self Care    Discharge Diagnosis:   #Aspiration pneumonitis   #History of C. difficile in 2023  #Hypertension  #CAD s/p CABG  #Hyperlipidemia  #Metastatic esophageal cancer s/p resection and gastroesophagostomy   #Metastatic pancreatic cancer  # Possible Mets to the liver  # Right-sided upper and mid abdominal pain due to above      Chief Complaint:   Chief Complaint   Patient presents with    Difficulty Breathing       History of Present Illness:   Dontae Cortez Jr. is a 53 year old male with past medical history of CAD, pancreatic cancer, esophageal cancer, hyperlipidemia, hypertension who presents ED for dyspnea.  Patient has had productive cough with purulent sputum and has been dyspneic for the past few days.  He has had chills but no fever.  He had chemo on Monday.  He was sent to outside ER a week ago and diagnosed with aspiration pneumonia.  He was treated with Augmentin and doxycycline but he reports no improvement afterwards.  He has  history of C. difficile and often gets diarrhea frequently.  He had CT of abdomen done per GI doctor today who told him he had pneumonia and needed to be admitted.   Please refer recent physical done by Dr. Clark Myles for details of admission    Brief Synopsis:     -Chest x-ray done on 3/6/2024 showed Stable cardiac and mediastinal contours.  Reticular nodular opacities of the mid/lower lungs bilaterally are seen to better advantage on recent CT of 03/06/2024.  No associated pleural effusion or pneumothorax.    -CT chest done on 3/6/2024 showed Extensive patchy consolidative and tree-in-bud type opacities throughout the lungs with some areas improved and others worse compared to 01/29/2024.  Findings may reflect aspiration pneumonia or other inflammatory process.  Given the solid nodular   components, metastatic disease cannot be excluded- had discussed with pulmonary Dr. Brooks regarding this.  -Blood cultures with no growth at 1 day  -Sputum culture ordered could not be done so far as patient not bringing up much phlegm  -Urine streptococcal antigen negative  -Treated with IV antibiotics  -Recently took course of oral antibiotics as outpatient for last week with no improvement according to patient.  -Seen by ID consult for recurrent episodes     #History of C. difficile in December 2023  -Oral vancomycin prophylaxis given while on antibiotics     #Hypertension  -Continued on losartan, metoprolol     #CAD s/p CABG  -Continued on home metoprolol     #Hyperlipidemia     #Metastatic esophageal cancer s/p resection and gastroesophagostomy   #Metastatic pancreatic cancer  # Possible Mets to the liver  # Right-sided upper and mid abdominal pain due to above  -CT chest abdomen done on 3/6/2024 and also showed hypoattenuating lesions of hepatic segment 8 correspond to areas of FDG avidity on PET-CT of 01/04/2024 most consistent with metastases, generally stable in size in the interim. No visible mass, obstruction, or bowel  wall thickening.  Stable focus of omental fat necrosis of the right mid abdomen.   -Patient follows up with oncology Dr. Foster as outpatient, on outpatient chemotherapy which was restarted on 2/5/2024 for progression of disease per oncology note .  Last chemo received on 2/22/2024 per chart review.  -Was on outpatient immunotherapy  -Follows up with GI Dr. Ritchie as outpatient  -On chart review, patient had EGD bronchoscopy on 2/20/2024 with removal of moderate amount of mucus near the ASD occluder device, friable mucosa with small ulcer in the proximal bronchus and removal of the right mainstem lesion at that time and pathology of that lesion revealed inflamed edematous bronchial wall tissue with squamous metaplasia and congested blood vessels, few bacteria on surface, negative for malignant cells at that time, fungal stain negative at that time.  -Pain management  -Gastric emptying study done today came back normal     Patient improved clinically.  Discharged home in stable condition on oral antibiotics per ID once cleared by all specialist including pulmonary, ID, GI and oncology and follow-up with them as outpatient as directed.  Follow-up with regular outpatient primary care physician Adrian Horowitz MD within 1 week in office     TCM Diagnosis at discharge from Hospital: Other: See above; still recommend for TCM follow-up    Lace+ Score: 80  59-90 High Risk  29-58 Medium Risk  0-28   Low Risk.     TCM Follow-Up Recommendation:Dontae Cortez Jr.   LACE > 58: High Risk of readmission after discharge from the hospital.      PCP: Adrian Horowitz MD    Consultations:   Consultants         Provider   Role Specialty     Giuseppe Sierra DO  Consulting Physician GASTROENTEROLOGY     Heather Ruiz MD  Consulting Physician INFECTIOUS DISEASES     Senia Foster MD  Consulting Physician Hematology and Oncology     Dusty Brooks MD  Consulting Physician PULMONARY DISEASES       Procedures during hospitalization:    None    Incidental or significant findings and recommendations (brief descriptions):  None    Lab/Test results pending at Discharge:   None      Discharge Medications:        Discharge Medications        START taking these medications        Instructions Prescription details   cefadroxil 500 MG Caps  Commonly known as: DURICEF      Take 1 capsule (500 mg total) by mouth 2 (two) times daily for 10 days.   Stop taking on: March 19, 2024  Quantity: 20 capsule  Refills: 0     metRONIDAZOLE 500 MG Tabs  Commonly known as: Flagyl      Take 1 tablet (500 mg total) by mouth 3 (three) times daily for 10 days.   Stop taking on: March 19, 2024  Quantity: 30 tablet  Refills: 0            CONTINUE taking these medications        Instructions Prescription details   baclofen 10 MG Tabs  Commonly known as: Lioresal      Take 1 tablet (10 mg total) by mouth 3 (three) times daily as needed.   Quantity: 270 tablet  Refills: 0     benzonatate 100 MG Caps  Commonly known as: Tessalon      Take 1 capsule (100 mg total) by mouth 3 (three) times daily as needed for cough.   Quantity: 90 capsule  Refills: 0     Creon 35358-00753 units Cpep  Generic drug: Pancrelipase (Lip-Prot-Amyl)      Take 2 capsules with meals and 1 capsule with snacks   Quantity: 240 capsule  Refills: 0     gabapentin 300 MG Caps  Commonly known as: Neurontin      Take 1 capsule (300 mg total) by mouth in the morning and 1 capsule (300 mg total) before bedtime.   Quantity: 180 capsule  Refills: 0     guaiFENesin-codeine 100-10 MG/5ML Soln  Commonly known as: Robitussin AC      Take 5 mL by mouth every 6 (six) hours as needed for cough.   Quantity: 237 mL  Refills: 1     HYDROcodone-acetaminophen 5-325 MG Tabs  Commonly known as: Norco      Take 1 tablet by mouth every 4 (four) hours as needed for Pain.   Quantity: 60 tablet  Refills: 0     losartan 50 MG Tabs  Commonly known as: Cozaar      Take 1 tablet (50 mg total) by mouth daily.   Quantity: 90 tablet  Refills:  2     metoclopramide 10 MG Tabs  Commonly known as: Reglan      Take 1 tablet (10 mg total) by mouth 4 (four) times daily before meals and nightly.   Stop taking on: Alanna 3, 2024  Quantity: 120 tablet  Refills: 2     metoprolol succinate ER 50 MG Tb24  Commonly known as: Toprol XL      TAKE 1 TABLET BY MOUTH EVERY DAY   Quantity: 90 tablet  Refills: 1     morphINE ER 15 MG Tbcr  Commonly known as: MS Contin      Take 1 tablet (15 mg total) by mouth every 12 (twelve) hours.   Stop taking on: March 14, 2024  Quantity: 60 tablet  Refills: 0     Omeprazole 40 MG Cpdr      Take 1 capsule (40 mg total) by mouth 2 (two) times daily before meals.   Quantity: 90 capsule  Refills: 3     ondansetron 8 MG tablet  Commonly known as: Zofran      Take 1 tablet (8 mg total) by mouth every 8 (eight) hours as needed for Nausea.   Quantity: 30 tablet  Refills: 3     sertraline 100 MG Tabs  Commonly known as: Zoloft      Take 2 tablets (200 mg total) by mouth daily.   Refills: 0            STOP taking these medications      sucralfate 1 g Tabs  Commonly known as: Carafate                  Where to Get Your Medications        These medications were sent to [facility]/pharmacy #[id number] - [facility] [address] [facility] [phone], [phone]  [address] [place] [address]      Phone: [phone]   cefadroxil 500 MG Caps       Please  your prescriptions at the location directed by your doctor or nurse    Bring a paper prescription for each of these medications  metRONIDAZOLE 500 MG Tabs          [place] prescription monitoring program data reviewed in epic before prescribing narcotics/controlled substances: Yes    Follow up Visits: Follow-up with Adrian Horowitz MD in 1 week    Adrian Horowitz MD  [address]  [facility] [address] [phone]    Schedule an appointment as soon as possible for a visit in 1 week(s)      Raheel Ritchie MD  [address] [place] [address] [phone]    Follow  up      Senia Foster MD  [address]   [facility] [address]  [facility] [address]  [phone]    Schedule an appointment as soon as possible for a visit      Heather Ruiz MD  [address] [facility] [address]  [facility] [address]  [phone]    Follow up      Appointments for Next 30 Days 3/9/2024 - 4/8/2024        Date Arrival Time Visit Type Length Department Provider     3/12/2024  9:30 AM  CHEMOTHERAPY [2076] 60 min [facility] in [place] PF [place] RN1    Patient Instructions:         Location Instructions:     Your appointment is scheduled at the [facility] in [place]. The address is [address] [place] [address]. Please park in the GREEN LOT and enter through the CANCER CENTER ENTRANCE of BUILDING A.. Once you arrive, please register at the [facility]  on the second floor.  Masks are optional for all patients and visitors, unless otherwise indicated. No care partners/visitors under 18 years of age are allowed in the infusion room.               3/12/2024 10:00 AM  ON TREATMENT VISIT FOLLOW-UP [0741] 15 min [facility] in [place] Cristina Brown APRN    Patient Instructions:         Location Instructions:     **IF YOU NEED LABWORK OR AN INFUSION ALONG WITH YOUR APPOINTMENT, YOU MUST CALL TO SCHEDULE.**  Your appointment is scheduled at the [facility] in [place]. The address is [address] [place] [address]. Please park in the GREEN LOT and enter through the CANCER CENTER ENTRANCE of BUILDING A. Once you arrive, please register at the Cancer Center  on the second floor.  Masks are optional for all patients and visitors, unless otherwise indicated.               3/12/2024 10:30 AM  PALLIATIVE CARE FOLLOW UP [2751] 30 min [facility] in [place] Ana Luisa Bravo APRN    Patient Instructions:         Location Instructions:     **IF YOU NEED LABWORK OR AN INFUSION ALONG WITH YOUR APPOINTMENT, YOU MUST CALL TO SCHEDULE.**  Your appointment  is scheduled at the [facility] in [place]. The address is [address] [place] [address]. [address] [place] [address]. Please park in the GREEN LOT and enter through the CANCER CENTER ENTRANCE of BUILDING A. Once you arrive, please register at the [facility]  on the second floor.  Masks are optional for all patients and visitors, unless otherwise indicated.                          Physical Exam:  /87 (BP Location: Right arm)   Pulse 63   Temp 97.5 °F (36.4 °C) (Temporal)   Resp 16   Ht 6' 2\" (1.88 m)   Wt 181 lb 10.5 oz (82.4 kg)   SpO2 98%   BMI 23.32 kg/m²     General: No acute distress  Respiratory: Clear to auscultation bilateral at this time, no wheezes, no rhonchi  Cardiovascular: S1, S2, regular rate and rhythm  Abdomen: Soft, Non-tender, non-distended, positive bowel sounds  Neuro: Awake alert, no new focal deficits.   Extremities: No pedal edema  No calf tenderness    Discharge Condition: stable    Patient Discharge Instructions: See electronic chart        Sakina Hernandez MD  3/9/2024

## 2024-03-12 ENCOUNTER — OFFICE VISIT (OUTPATIENT)
Dept: HEMATOLOGY/ONCOLOGY | Age: 54
End: 2024-03-12
Attending: INTERNAL MEDICINE
Payer: COMMERCIAL

## 2024-03-12 VITALS
HEIGHT: 73.82 IN | BODY MASS INDEX: 23.36 KG/M2 | HEART RATE: 62 BPM | SYSTOLIC BLOOD PRESSURE: 121 MMHG | OXYGEN SATURATION: 95 % | DIASTOLIC BLOOD PRESSURE: 69 MMHG | RESPIRATION RATE: 16 BRPM | WEIGHT: 182 LBS | TEMPERATURE: 98 F

## 2024-03-12 DIAGNOSIS — C15.9 ESOPHAGEAL CARCINOMA (HCC): ICD-10-CM

## 2024-03-12 DIAGNOSIS — C15.5 MALIGNANT NEOPLASM OF LOWER THIRD OF ESOPHAGUS (HCC): Primary | ICD-10-CM

## 2024-03-12 DIAGNOSIS — J69.0 ASPIRATION PNEUMONIA OF BOTH LUNGS DUE TO GASTRIC SECRETIONS, UNSPECIFIED PART OF LUNG (HCC): ICD-10-CM

## 2024-03-12 DIAGNOSIS — G89.3 NEOPLASM RELATED PAIN: ICD-10-CM

## 2024-03-12 DIAGNOSIS — Z51.5 PALLIATIVE CARE BY SPECIALIST: Primary | ICD-10-CM

## 2024-03-12 LAB
ALBUMIN SERPL-MCNC: 3.1 G/DL (ref 3.4–5)
ALBUMIN/GLOB SERPL: 0.8 {RATIO} (ref 1–2)
ALP LIVER SERPL-CCNC: 103 U/L
ALT SERPL-CCNC: 26 U/L
ANION GAP SERPL CALC-SCNC: 3 MMOL/L (ref 0–18)
AST SERPL-CCNC: 22 U/L (ref 15–37)
BASOPHILS # BLD AUTO: 0.03 X10(3) UL (ref 0–0.2)
BASOPHILS NFR BLD AUTO: 0.7 %
BILIRUB SERPL-MCNC: 0.2 MG/DL (ref 0.1–2)
BUN BLD-MCNC: 16 MG/DL (ref 9–23)
CALCIUM BLD-MCNC: 8.7 MG/DL (ref 8.5–10.1)
CANCER AG19-9 SERPL-ACNC: 424.8 U/ML (ref ?–37)
CHLORIDE SERPL-SCNC: 107 MMOL/L (ref 98–112)
CO2 SERPL-SCNC: 29 MMOL/L (ref 21–32)
CREAT BLD-MCNC: 0.72 MG/DL
EGFRCR SERPLBLD CKD-EPI 2021: 109 ML/MIN/1.73M2 (ref 60–?)
EOSINOPHIL # BLD AUTO: 0.17 X10(3) UL (ref 0–0.7)
EOSINOPHIL NFR BLD AUTO: 4.1 %
ERYTHROCYTE [DISTWIDTH] IN BLOOD BY AUTOMATED COUNT: 15.2 %
GLOBULIN PLAS-MCNC: 3.9 G/DL (ref 2.8–4.4)
GLUCOSE BLD-MCNC: 119 MG/DL (ref 70–99)
HCT VFR BLD AUTO: 32.9 %
HGB BLD-MCNC: 10.5 G/DL
IMM GRANULOCYTES # BLD AUTO: 0.05 X10(3) UL (ref 0–1)
IMM GRANULOCYTES NFR BLD: 1.2 %
LYMPHOCYTES # BLD AUTO: 0.71 X10(3) UL (ref 1–4)
LYMPHOCYTES NFR BLD AUTO: 17.1 %
MCH RBC QN AUTO: 29.9 PG (ref 26–34)
MCHC RBC AUTO-ENTMCNC: 31.9 G/DL (ref 31–37)
MCV RBC AUTO: 93.7 FL
MONOCYTES # BLD AUTO: 0.53 X10(3) UL (ref 0.1–1)
MONOCYTES NFR BLD AUTO: 12.8 %
NEUTROPHILS # BLD AUTO: 2.66 X10 (3) UL (ref 1.5–7.7)
NEUTROPHILS # BLD AUTO: 2.66 X10(3) UL (ref 1.5–7.7)
NEUTROPHILS NFR BLD AUTO: 64.1 %
OSMOLALITY SERPL CALC.SUM OF ELEC: 290 MOSM/KG (ref 275–295)
PLATELET # BLD AUTO: 193 10(3)UL (ref 150–450)
POTASSIUM SERPL-SCNC: 4.3 MMOL/L (ref 3.5–5.1)
PROT SERPL-MCNC: 7 G/DL (ref 6.4–8.2)
RBC # BLD AUTO: 3.51 X10(6)UL
SODIUM SERPL-SCNC: 139 MMOL/L (ref 136–145)
WBC # BLD AUTO: 4.2 X10(3) UL (ref 4–11)

## 2024-03-12 PROCEDURE — 99214 OFFICE O/P EST MOD 30 MIN: CPT | Performed by: NURSE PRACTITIONER

## 2024-03-12 PROCEDURE — 86301 IMMUNOASSAY TUMOR CA 19-9: CPT

## 2024-03-12 PROCEDURE — 80053 COMPREHEN METABOLIC PANEL: CPT

## 2024-03-12 PROCEDURE — 99215 OFFICE O/P EST HI 40 MIN: CPT | Performed by: NURSE PRACTITIONER

## 2024-03-12 PROCEDURE — 96417 CHEMO IV INFUS EACH ADDL SEQ: CPT

## 2024-03-12 PROCEDURE — 96415 CHEMO IV INFUSION ADDL HR: CPT

## 2024-03-12 PROCEDURE — 96368 THER/DIAG CONCURRENT INF: CPT

## 2024-03-12 PROCEDURE — 96375 TX/PRO/DX INJ NEW DRUG ADDON: CPT

## 2024-03-12 PROCEDURE — 96413 CHEMO IV INFUSION 1 HR: CPT

## 2024-03-12 PROCEDURE — 85025 COMPLETE CBC W/AUTO DIFF WBC: CPT

## 2024-03-12 RX ORDER — FLUOROURACIL 50 MG/ML
2400 INJECTION, SOLUTION INTRAVENOUS CONTINUOUS
Status: DISCONTINUED | OUTPATIENT
Start: 2024-03-12 | End: 2024-03-12

## 2024-03-12 RX ORDER — CYCLOBENZAPRINE HCL 5 MG
5 TABLET ORAL 3 TIMES DAILY PRN
Qty: 30 TABLET | Refills: 0 | Status: SHIPPED | OUTPATIENT
Start: 2024-03-12

## 2024-03-12 RX ORDER — MORPHINE SULFATE 15 MG/1
15 TABLET, FILM COATED, EXTENDED RELEASE ORAL EVERY 12 HOURS SCHEDULED
Qty: 60 TABLET | Refills: 0 | Status: SHIPPED | OUTPATIENT
Start: 2024-03-12 | End: 2024-04-11

## 2024-03-12 RX ORDER — FLUOROURACIL 50 MG/ML
2400 INJECTION, SOLUTION INTRAVENOUS CONTINUOUS
Status: CANCELLED | OUTPATIENT
Start: 2024-03-12

## 2024-03-12 RX ADMIN — FLUOROURACIL 5000 MG: 50 INJECTION, SOLUTION INTRAVENOUS at 14:34:00

## 2024-03-12 NOTE — PROGRESS NOTES
Cancer Center Progress Note    Patient Name: Dontae Cortez Jr.   YOB: 1970   Medical Record Number: [id number]   CSN: [id number]   Date of visit: 3/12/2024       Chief Complaint/Reason for Visit:  Chief Complaint   Patient presents with    Follow - Up    Chemotherapy      Oncology History   -2018: Per report had a normal EGD and colonoscopy     -June 2022: He met with GI due to new onset dysphagia which started about 1 month prior to his visit.  He had an esophagram with a focal abrupt narrowing with irregular margins in the distal one third of the esophagus extending to the GE junction.  He also had an episode of food impaction. He has also lost about 15 lb. He was a heavy smoker from age 14 to about 40 (1.5 PPD). Also with alcohol use currently. Mother with a history of breast and colon cancer.      -EGD and EUS with Dr. Yadav on 6/7/2022: Severe esophagitis with a concerning mass extending 37-39 centimeters from the incisors. Nonobstructive mass.  By EUS uT3N1 disease.  Pathology showed invasive moderate to poorly differentiated adenocarcinoma.  HER2 amplified by FISH     -PET/CT on 6/20/2022: Increased distal esophageal uptake with an SUV of 14.4.  Concern for shreya metastases.     -Concurrent chemoradiation with carboplatin AUC2 plus paclitaxel 50 mg/m2: 7/6/22-8/10/22:  went from 137 to 30.9.      -PET/CT on 9/6/22: showed overall improvement     -Surgery with Dr. Lu on 9/30/22: ypT1b pN0. Recovery has been complicated by an esophageal leak     -I met with him on 12/12/22. We discussed adjuvant opdivo for 1 year. His  was elevated to 48 and repeat was 64. CT CAP was recommended.      -He was admitted on 12/25/22 for abdominal pain. He had a POEM procedure done. He was also noted to have a RLL consolidation. He was seen by pulmonary, based on imaging an outpatient PET/CT was recommended and a biopsy of the most FDG avid lesion would be considered. Noted to have CAD and an  outpatient evaluation was recommended.      -PET/CT on 1/4/23-imaging reviewed in tumor board and no focal areas of concern and recommendation was to start immunotherapy.      -Adjuvant Opdivo: 1/19/2023-3/6/23. During this time, his tumor marker was rising but imaging was negative. Imaging from 4/10/23 was concerning a pancreatic head mass. HE underwent and EGD on 4/7/232 with dr. Hinson. 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 Her 2 IHC was 2+ on this specimen but FISH was negative. Due to these findings, treatment for metastatic esophageal cancer was considered and he was started on FOLFOX + Herceptin + IO.      FOLFOX + Herceptin + Immunotherapy  -C1: 5/1/23:   -C2: 5/15/23:  1454:   -C3: 6/5/23: Switched to Xeloda, Oxaliplatin, Herceptin, Keytruda due to national 5-FU shortage:  329  -C4: 7/10/23: Delayed to the thrombocytopenia:  79 to 35 Will switch back to 5-FU  -C5: 7/24/23:  21.5  -PET/CT from 7/27/23 showed resolution of disease   -C6: 8/7/23:  15.1  -C7: 8/28/23: Ca19-9 Held Oxaliplatin d/t neuropathy.  13.9  -C8: 9/11/23:  12.3 Oxaliplatin held. Signatera negative     -Herceptin and Keytruda maintenance q3 weeks since Signatera was negative   -C1: 9/25/23:  10.3  -C2: 10/9/23: Ca19-9 15. 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 Also noted to have diffuse GGO and MS/Hilar LAD-unclear if this related to previous infections. Given findings, restarting FOLFOX +  herceptin + IO recommended.      -admitted from 1/8/24-1/13/24 for persistent dysphagia and bronchesophageal stent. Plan is for an ASD occluder and removal of the bronchial stent as an outpatient.      -1/17/24: EGD with fistula closure with ASD occluder and removal of the bronchial stent.     -1/22/24: Hercepin + IO treatment only since he was having sternotomy wires removed later in the week      -FOLFOX + Herceptin + Opdivo restarted                -C1: 2/5/24:  846               -C2: 2/22/24:  was not drawn     -Admitted for aspiration PNA on 3/6/24-CT Chest/Abdomen showed extensive patchy consolidative and tree in bud appearance. There are some solid nodular components.. No new sites of disease in abdomen. Stable hepatic lesion. Of note, a right mainstain lesion was biopsied by pulmonary on 2/20/24 and was negative for cancer.     -FOLFOX + Herceptin + Opdivo   -C3 3/12/24:  424    History of Present Illness:   Dontae Cortez Jr. Is a 53 year old patient of Dr. Foster's with metastatic esophageal cancer as above. He is receiving chemotherapy with FOLFOX + Herceptin + opdivo. He presents today for consideration of his third cycle.   He was hospitalized from 3/6/24-3/9-24 for aspiration pneumonia and abdominal pain.  He is feeling much better since he has been home. Energy has been at baseline. He denies any fevers. He continues to have productive cough, usually in the morning. Began antibiotics on Sunday, to continue for 10 days total.  Denies any nausea or vomiting  Diarrhea started while in the hospital, cdiff was negative. Had episode Sunday and Monday otherwise stools are soft. Has not needed imodium.   Appetite has been great, no dysphagia   No bleeding concern  Neuropathy to his fingers has not changed, not present to his feet.   No abdominal pain at rest, worsens with movement or after meals. Cough aggravates pain. Using hydrocodone for pain, last dose was last night. Does not  usually take it during the day due to side effects.  Mild left lower extremity swelling  He reports syncopal episode yesterday, he is unsure of events, woke up after his nap walked to the door to open it and does not recall what happened after that. His son found him on the floor. Scraped his left wrist, no other injury noted. No bleeding. No confusion.  Took muscle relaxer before his nap. Did not take any other medications prior to that. Does not recall if he was dizzy or LH.  This has happened three times within the last four months specifically after he wakes up.         Problem List:  Patient Active Problem List   Diagnosis    Primary hypertension    Hyperlipidemia with target low density lipoprotein (LDL) cholesterol less than 70 mg/dL    Coronary artery disease involving native coronary artery of native heart without angina pectoris    S/P CABG x 4    Nontoxic multinodular goiter    Pulmonary nodules    Thrombophlebitis leg    BRANDAN (generalized anxiety disorder)    Right shoulder Arthroscopy acromioplasty ,distal  clavicle resection, rotator cuff/labral debridment  Global 05/13/2021    Right bicipital tenosynovitis    Malignant neoplasm of esophagus (HCC)    Esophageal anastomotic leak    Esophageal obstruction    On total parenteral nutrition (TPN)    Mechanical complication of esophagostomy (HCC)    Abdominal pain of unknown etiology    Migration of esophageal stent    Gastroparesis    Esophageal carcinoma (HCC)    Normocytic anemia    Esophageal fistula    Subacute cough    Acquired bronchoesophageal fistula (HCC)    Pneumonia of both lower lobes due to infectious organism    Malignant neoplasm of pancreas (HCC)    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 difficile colitis    Dysphagia, unspecified type    Shortness of breath    Hypokalemia    Dysphagia    Narcotic drug use    History of esophageal cancer    Palliative care by specialist    Neoplasm related pain    Endobronchial mass    Hyponatremia    Thrombocytopenia (HCC)    Acute kidney injury (HCC)    Hyperglycemia    Aspiration pneumonitis (HCC)    C. difficile diarrhea    Aspiration pneumonia (HCC)    Malignant neoplasm metastatic to liver (HCC)    Hyperlipidemia        Medical History:  Past Medical History:   Diagnosis Date    Back problem     Belching 02/01/2022    Black stools 02/01/2022    Borderline diabetes     Dx in 8/2013 - HgA1C 6.2%    C. difficile diarrhea 10/23/2022    pt treated and without symptoms    Chest pain 02/01/2022    Coronary artery disease     On 8/16/13: CABG x 4 with LIMA to LAD and SVG to diagonal, OM and PDA    Decorative tattoo 01/01/2000    Depression     Difficult intubation     h/o esophagectomy for CA; developed esophagobronchial vistula    Disorder of liver     LIVER CA    Esophageal cancer (HCC)     completed chemo    Esophageal reflux     Essential hypertension     Exposure to medical diagnostic radiation     last tx 8/18/2022    Frequent urination 01/01/2013    Gastroparesis     Heartburn 02/01/2022    High blood pressure     High cholesterol 08/01/2013    Found when I had quadruple bypass    History of COVID-19 10/16/2022    asymptomatic - pt was dx during a hospitalization for another diagnosis. No continued symptoms    History of stomach ulcers     Hyperlipidemia     Hyperlipidemia LDL goal < 70     Indigestion 02/01/2022    Morbid obesity with BMI of 40.0-44.9, adult (HCC)     Muscle weakness     Nontoxic multinodular goiter     Dx in 8/2013: pt was told that imaging showed thyroid cysts per PCP    Pancreatic cancer (HCC)     last dose 12/4/2023 is scheduled for  another round 12/27/23    Peripheral vascular disease (HCC)     pt denies    Personal history of antineoplastic chemotherapy     for esophageal cancer/completed    Personal history of antineoplastic chemotherapy 12/2023    pancreatic cancer    Problems with swallowing 02/01/2022    Pulmonary nodules     Dx in 8/2013: CT chest showed small bilateral fissural-based lung nodules less than 1 cm    S/P CABG x 4     On 8/16/13: CABG x 4 with LIMA to LAD and SVG to diagonal, OM and PDA    Shortness of breath     when coughing; no oxygen    Vomiting 02/01/2022       Surgical History:  Past Surgical History:   Procedure Laterality Date    APPENDECTOMY      APPENDECTOMY      ARTHROSCOPY OF JOINT UNLISTED      right shoulder    CABG      On 8/16/13: CABG x 4 with LIMA to LAD and SVG to diagonal, OM and PDA    CARDIAC CATH LAB      On 8/14/2013: cardiac cath showed 3-vessel disease    OTHER SURGICAL HISTORY      1.       Laparoscopic robotic-assisted esophagogastrectomy.    PORT, INDWELLING, IMP         Allergies:  No Known Allergies    Family History:  Family History   Problem Relation Age of Onset    Cancer Mother         breast and colon     Diabetes Neg        Social History:  Social History     Socioeconomic History    Marital status:      Spouse name: Not on file    Number of children: 3    Years of education: Not on file    Highest education level: Not on file   Occupational History    Occupation: works as  - on workman's comp   Tobacco Use    Smoking status: Former     Packs/day: 1.00     Years: 27.00     Additional pack years: 0.00     Total pack years: 27.00     Types: Cigarettes     Quit date: 8/15/2013     Years since quitting: 10.5    Smokeless tobacco: Never    Tobacco comments:     Quit smoking 2013   Vaping Use    Vaping Use: Never used   Substance and Sexual Activity    Alcohol use: Not Currently    Drug use: Never    Sexual activity: Not Currently     Partners: Female   Other Topics Concern      Service Not Asked    Blood Transfusions Not Asked    Caffeine Concern Yes    Occupational Exposure Not Asked    Hobby Hazards Not Asked    Sleep Concern Not Asked    Stress Concern No    Weight Concern No    Special Diet No    Back Care Not Asked    Exercise No    Bike Helmet Not Asked    Seat Belt No    Self-Exams Not Asked   Social History Narrative    Lives with life partner    Has 3 daughters - 1 lives closeby, 1 in [place], 1 in [place]     Social Determinants of Health     Financial Resource Strain: Low Risk  (12/14/2023)    Financial Resource Strain     Difficulty of Paying Living Expenses: Not very hard     Med Affordability: No   Food Insecurity: No Food Insecurity (3/7/2024)    Food Insecurity     Food Insecurity: Never true   Transportation Needs: No Transportation Needs (3/7/2024)    Transportation Needs     Lack of Transportation: No   Physical Activity: Not on file   Stress: Not on file   Social Connections: Not on file   Housing Stability: Low Risk  (3/7/2024)    Housing Stability     Housing Instability: No     Housing Instability Emergency: No       Medications:    Current Outpatient Medications:     metRONIDAZOLE 500 MG Oral Tab, Take 1 tablet (500 mg total) by mouth 3 (three) times daily for 10 days., Disp: 30 tablet, Rfl: 0    cefadroxil 500 MG Oral Cap, Take 1 capsule (500 mg total) by mouth 2 (two) times daily for 10 days., Disp: 20 capsule, Rfl: 0    metoclopramide (REGLAN) 10 MG Oral Tab, Take 1 tablet (10 mg total) by mouth 4 (four) times daily before meals and nightly., Disp: 120 tablet, Rfl: 2    baclofen 10 MG Oral Tab, Take 1 tablet (10 mg total) by mouth 3 (three) times daily as needed., Disp: 270 tablet, Rfl: 0    ondansetron (ZOFRAN) 8 MG tablet, Take 1 tablet (8 mg total) by mouth every 8 (eight) hours as needed for Nausea., Disp: 30 tablet, Rfl: 3    HYDROcodone-acetaminophen 5-325 MG Oral Tab, Take 1 tablet by mouth every 4 (four) hours as needed for Pain., Disp: 60 tablet, Rfl:  0    Pancrelipase, Lip-Prot-Amyl, (CREON) 73485-95988 units Oral Cap DR Particles, Take 2 capsules with meals and 1 capsule with snacks, Disp: 240 capsule, Rfl: 0    gabapentin 300 MG Oral Cap, Take 1 capsule (300 mg total) by mouth in the morning and 1 capsule (300 mg total) before bedtime., Disp: 180 capsule, Rfl: 0    guaiFENesin-codeine 100-10 MG/5ML Oral Solution, Take 5 mL by mouth every 6 (six) hours as needed for cough., Disp: 237 mL, Rfl: 1    morphINE ER 15 MG Oral Tab CR, Take 1 tablet (15 mg total) by mouth every 12 (twelve) hours., Disp: 60 tablet, Rfl: 0    benzonatate 100 MG Oral Cap, Take 1 capsule (100 mg total) by mouth 3 (three) times daily as needed for cough., Disp: 90 capsule, Rfl: 0    sertraline 100 MG Oral Tab, Take 2 tablets (200 mg total) by mouth daily., Disp: , Rfl:     losartan 50 MG Oral Tab, Take 1 tablet (50 mg total) by mouth daily., Disp: 90 tablet, Rfl: 2    Omeprazole 40 MG Oral Capsule Delayed Release, Take 1 capsule (40 mg total) by mouth 2 (two) times daily before meals., Disp: 90 capsule, Rfl: 3    metoprolol succinate ER 50 MG Oral Tablet 24 Hr, TAKE 1 TABLET BY MOUTH EVERY DAY, Disp: 90 tablet, Rfl: 1    Review of Systems:  A comprehensive 14 point review of systems was completed.  Pertinent positives and negatives noted in the HPI.    Performance Status: ECOG 0 - Fully active, able to carry on all predisease activities without restrictions.    Physical Examination:  Vital Signs: Height: 187.5 cm (6' 1.82\") (03/12 0940)  Weight: 82.6 kg (182 lb) (03/12 0940)  BSA (Calculated - sq m): 2.08 sq meters (03/12 0940)  Pulse: 62 (03/12 0940)  BP: 121/69 (03/12 0940)  Temp: 97.8 °F (36.6 °C) (03/12 0940)  Do Not Use - Resp Rate: --  SpO2: 95 % (03/12 0940)    General: Patient is alert and oriented x 3, not in acute distress.  HEENT: Anicteric, conjunctivae and sclerae clear, no oropharyngeal lesion/thrush, mucous membranes are moist   Chest: Clear to auscultation. Respirations  unlabored.   Heart: Regular rate and rhythm.   Abdomen: Soft, non-distended, non-tender with present bowel sounds.  Extremities: mild left lower extremity swelling  Neurological: Grossly intact.   Skin: warm, dry, no erythema or rash   Psych/Depression: mood and affect are appropriate.     Labs:     Recent Results (from the past 72 hour(s))   CARBOHYDRATE ANTIGEN 19-9 [E]    Collection Time: 03/12/24  9:25 AM   Result Value Ref Range    Carbohydrate Ag 19-9 424.8 (H) <=37.0 U/mL   Comp Metabolic Panel (14)    Collection Time: 03/12/24  9:25 AM   Result Value Ref Range    Glucose 119 (H) 70 - 99 mg/dL    Sodium 139 136 - 145 mmol/L    Potassium 4.3 3.5 - 5.1 mmol/L    Chloride 107 98 - 112 mmol/L    CO2 29.0 21.0 - 32.0 mmol/L    Anion Gap 3 0 - 18 mmol/L    BUN 16 9 - 23 mg/dL    Creatinine 0.72 0.70 - 1.30 mg/dL    Calcium, Total 8.7 8.5 - 10.1 mg/dL    Calculated Osmolality 290 275 - 295 mOsm/kg    eGFR-Cr 109 >=60 mL/min/1.73m2    AST 22 15 - 37 U/L    ALT 26 16 - 61 U/L    Alkaline Phosphatase 103 45 - 117 U/L    Bilirubin, Total 0.2 0.1 - 2.0 mg/dL    Total Protein 7.0 6.4 - 8.2 g/dL    Albumin 3.1 (L) 3.4 - 5.0 g/dL    Globulin  3.9 2.8 - 4.4 g/dL    A/G Ratio 0.8 (L) 1.0 - 2.0    Patient Fasting for CMP? Patient not present    CBC W/ DIFFERENTIAL    Collection Time: 03/12/24  9:25 AM   Result Value Ref Range    WBC 4.2 4.0 - 11.0 x10(3) uL    RBC 3.51 (L) 4.30 - 5.70 x10(6)uL    HGB 10.5 (L) 13.0 - 17.5 g/dL    HCT 32.9 (L) 39.0 - 53.0 %    .0 150.0 - 450.0 10(3)uL    MCV 93.7 80.0 - 100.0 fL    MCH 29.9 26.0 - 34.0 pg    MCHC 31.9 31.0 - 37.0 g/dL    RDW 15.2 %    Neutrophil Absolute Prelim 2.66 1.50 - 7.70 x10 (3) uL    Neutrophil Absolute 2.66 1.50 - 7.70 x10(3) uL    Lymphocyte Absolute 0.71 (L) 1.00 - 4.00 x10(3) uL    Monocyte Absolute 0.53 0.10 - 1.00 x10(3) uL    Eosinophil Absolute 0.17 0.00 - 0.70 x10(3) uL    Basophil Absolute 0.03 0.00 - 0.20 x10(3) uL    Immature Granulocyte Absolute 0.05  0.00 - 1.00 x10(3) uL    Neutrophil % 64.1 %    Lymphocyte % 17.1 %    Monocyte % 12.8 %    Eosinophil % 4.1 %    Basophil % 0.7 %    Immature Granulocyte % 1.2 %       Impression/Plan    Metastatic esophageal cancer:  S/p chemoRT followed by esophagectomy 9/2022  ASD occluder device placed 1/17/2024   Restarted FOLFOX + opdivo + Herceptin 2/5/24  Plan to proceed with C3 today, labs reviewed with him  Repeat imaging after C4  Due for echo, order placed previously, instructed him to schedule.    Recurrent aspiration pneumonia/cough  Continue cefadroxil and metronidazole as directed   Follow with imaging  Following with pulmonology     Pain secondary to cancer  Following with palliative care  Pain is managed at this time, trying to use opioids at night due to side effects      Planned Follow Up: 2 weeks with Dr. Foster, labs, chemo    Risk Level: HIGH esophageal cancer, receiving chemotherapy requiring close monitoring     The 21st Century Cures Act makes medical notes like these available to patients in the interest of transparency. Please be advised this is a medical document. Medical documents are intended to carry relevant information, facts as evident, and the clinical opinion of the practitioner. The medical note is intended as peer to peer communication and may appear blunt or direct. It is written in medical language and may contain abbreviations or verbiage that are unfamiliar.     Electronically Signed by:    MIO Hong-BC  Nurse Practitioner  [facility]

## 2024-03-12 NOTE — PROGRESS NOTES
Outpatient Oncology Care Plan  Problem list:  diarrhea  respiratory  knowledge deficit    Problems related to:    combined modality therapy  disease/disease progression    Interventions:  provided general teaching    Expected outcomes:  understands plan of care    Progress towards outcome:  making progress    Education Record    Learner:  Patient  Barriers / Limitations:  None  Method:  Brief focused  Outcome:  Shows understanding  Comments: OTV C17 expected. Pt states he got home from the hospital Saturday. States he fell yesterday, does not remember why he fell just woke up on the ground. States he did not hit his head that he knows of. Denies any pain or blurred vision. Reports occasional diarrhea from abx. States he was tested for cdiff in the hospital. No change in neuropathy.

## 2024-03-12 NOTE — PROGRESS NOTES
Pt here for C17D1 Drug(s)Opdivo, Trazimera, Oxaliplatin, Leucovorin, 5FU.  Arrives Ambulating independently, accompanied by Self     Patient was evaluated today by LD and Treatment Nurse.    Oral medications included in this regimen:  no    Patient confirms comprehension of cancer treatment schedule:  yes    Pregnancy screening:  Not applicable    Modifications in dose or schedule:  Yes. No Keytruda today    Medications appearance and physical integrity checked by RN: yes.    Chemotherapy IV pump settings verified by 2 RNs:  Yes.  Frequency of blood return and site check throughout administration: Prior to administration, Prior to each drug, and At completion of therapy     Infusion/treatment outcome:  patient tolerated treatment without incident    Education Record    Learner:  Patient  Barriers / Limitations:  None  Method:  Brief focused and Discussion  Education / instructions given:  Plan of care and next appointment reviewed. Pt instructed to call and schedule Echo as ordered per Dr Foster while on Trazimera. Pt called and states he is scheduled for 3/18 at 7 am.  CADD pump connected and running. All connections taped and secured. Self disconnect kit given to pt for self DC on 3/14.  Outcome:  Shows understanding    Discharged Home, Ambulating independently, accompanied by:Self in stable condition, no new complaints.    Patient/family verbalized understanding of future appointments: by [facility] messaging

## 2024-03-12 NOTE — PROGRESS NOTES
Palliative Care Follow Up Note     Patient Name: Dontae Cortez Jr.   YOB: 1970   Medical Record Number: [id number]   CSN: [id number]   Date of visit: 3/12/2024     Chief Complaint/Reason for Visit:  Follow up for symptoms     History of Present Illness:         Dontae Cortez Jr. is a 53 year old male with metastatic esophageal cancer receiving chemotherapy.  He feels his mid chest and rib pain varies in intensity but is much better controlled with addition of morphine.  Its deep achy, stabbing, sharp pain when present.  He is using 1 norco daily for BTP.  He is finding gabapentin has helped some with JUSTIN but this is still bothersome.  He is sleeping at night now.   He feels his low back continues to feel tight and pulling, has persisted and feels baclofen isn't helpful.  His goal is to return back to work as a  as pain and disease improves.   He feels he has been more active with improved pain control and he is happy about this.  He denies constipation, N/V.  He is grateful for better pain control and feeling better.      Problem List:  Patient Active Problem List   Diagnosis    Primary hypertension    Hyperlipidemia with target low density lipoprotein (LDL) cholesterol less than 70 mg/dL    Coronary artery disease involving native coronary artery of native heart without angina pectoris    S/P CABG x 4    Nontoxic multinodular goiter    Pulmonary nodules    Thrombophlebitis leg    BRANDAN (generalized anxiety disorder)    Right shoulder Arthroscopy acromioplasty ,distal  clavicle resection, rotator cuff/labral debridment  Global 05/13/2021    Right bicipital tenosynovitis    Malignant neoplasm of esophagus (HCC)    Esophageal anastomotic leak    Esophageal obstruction    On total parenteral nutrition (TPN)    Mechanical complication of esophagostomy (HCC)    Abdominal pain of unknown etiology    Migration of esophageal stent    Gastroparesis    Esophageal carcinoma (HCC)     Normocytic anemia    Esophageal fistula    Subacute cough    Acquired bronchoesophageal fistula (HCC)    Pneumonia of both lower lobes due to infectious organism    Malignant neoplasm of pancreas (HCC)    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 difficile colitis    Dysphagia, unspecified type    Shortness of breath    Hypokalemia    Dysphagia    Narcotic drug use    History of esophageal cancer    Palliative care by specialist    Neoplasm related pain    Endobronchial mass    Hyponatremia    Thrombocytopenia (HCC)    Acute kidney injury (HCC)    Hyperglycemia    Aspiration pneumonitis (HCC)    C. difficile diarrhea    Aspiration pneumonia (HCC)    Malignant neoplasm metastatic to liver (HCC)    Hyperlipidemia      Medical History:  Past Medical History:   Diagnosis Date    Back problem     Belching 02/01/2022    Black stools 02/01/2022    Borderline diabetes     Dx in 8/2013 - HgA1C 6.2%    C. difficile diarrhea 10/23/2022    pt treated and without symptoms    Chest pain 02/01/2022    Coronary artery disease     On 8/16/13: CABG x 4 with LIMA to LAD and SVG to diagonal, OM and PDA    Decorative tattoo 01/01/2000    Depression     Difficult intubation     h/o esophagectomy for CA; developed esophagobronchial vistula    Disorder of liver     LIVER CA    Esophageal cancer (HCC)     completed chemo    Esophageal reflux     Essential hypertension     Exposure to medical diagnostic radiation     last tx 8/18/2022    Frequent urination 01/01/2013    Gastroparesis     Heartburn 02/01/2022    High blood pressure     High cholesterol 08/01/2013    Found when I had quadruple  bypass    History of COVID-19 10/16/2022    asymptomatic - pt was dx during a hospitalization for another diagnosis. No continued symptoms    History of stomach ulcers     Hyperlipidemia     Hyperlipidemia LDL goal < 70     Indigestion 02/01/2022    Morbid obesity with BMI of 40.0-44.9, adult (HCC)     Muscle weakness     Nontoxic multinodular goiter     Dx in 8/2013: pt was told that imaging showed thyroid cysts per PCP    Pancreatic cancer (HCC)     last dose 12/4/2023 is scheduled for another round 12/27/23    Peripheral vascular disease (HCC)     pt denies    Personal history of antineoplastic chemotherapy     for esophageal cancer/completed    Personal history of antineoplastic chemotherapy 12/2023    pancreatic cancer    Problems with swallowing 02/01/2022    Pulmonary nodules     Dx in 8/2013: CT chest showed small bilateral fissural-based lung nodules less than 1 cm    S/P CABG x 4     On 8/16/13: CABG x 4 with LIMA to LAD and SVG to diagonal, OM and PDA    Shortness of breath     when coughing; no oxygen    Vomiting 02/01/2022     Surgical History:  Past Surgical History:   Procedure Laterality Date    APPENDECTOMY      APPENDECTOMY      ARTHROSCOPY OF JOINT UNLISTED      right shoulder    CABG      On 8/16/13: CABG x 4 with LIMA to LAD and SVG to diagonal, OM and PDA    CARDIAC CATH LAB      On 8/14/2013: cardiac cath showed 3-vessel disease    OTHER SURGICAL HISTORY      1.       Laparoscopic robotic-assisted esophagogastrectomy.    PORT, INDWELLING, IMP         Allergies:  No Known Allergies    Palliative Care Social History:    Marital Status: Alysha  Children:   Living Situation: independent with spouse      Medications:  Current Outpatient Medications   Medication Sig Dispense Refill    morphINE ER 15 MG Oral Tab CR Take 1 tablet (15 mg total) by mouth every 12 (twelve) hours. 60 tablet 0    cyclobenzaprine 5 MG Oral Tab Take 1 tablet (5 mg total) by mouth 3 (three) times daily as needed for Muscle  spasms. 30 tablet 0    metRONIDAZOLE 500 MG Oral Tab Take 1 tablet (500 mg total) by mouth 3 (three) times daily for 10 days. 30 tablet 0    cefadroxil 500 MG Oral Cap Take 1 capsule (500 mg total) by mouth 2 (two) times daily for 10 days. 20 capsule 0    metoclopramide (REGLAN) 10 MG Oral Tab Take 1 tablet (10 mg total) by mouth 4 (four) times daily before meals and nightly. 120 tablet 2    ondansetron (ZOFRAN) 8 MG tablet Take 1 tablet (8 mg total) by mouth every 8 (eight) hours as needed for Nausea. 30 tablet 3    HYDROcodone-acetaminophen 5-325 MG Oral Tab Take 1 tablet by mouth every 4 (four) hours as needed for Pain. 60 tablet 0    Pancrelipase, Lip-Prot-Amyl, (CREON) 18741-44739 units Oral Cap DR Particles Take 2 capsules with meals and 1 capsule with snacks 240 capsule 0    gabapentin 300 MG Oral Cap Take 1 capsule (300 mg total) by mouth in the morning and 1 capsule (300 mg total) before bedtime. 180 capsule 0    guaiFENesin-codeine 100-10 MG/5ML Oral Solution Take 5 mL by mouth every 6 (six) hours as needed for cough. 237 mL 1    benzonatate 100 MG Oral Cap Take 1 capsule (100 mg total) by mouth 3 (three) times daily as needed for cough. 90 capsule 0    sertraline 100 MG Oral Tab Take 2 tablets (200 mg total) by mouth daily.      losartan 50 MG Oral Tab Take 1 tablet (50 mg total) by mouth daily. 90 tablet 2    Omeprazole 40 MG Oral Capsule Delayed Release Take 1 capsule (40 mg total) by mouth 2 (two) times daily before meals. 90 capsule 3    metoprolol succinate ER 50 MG Oral Tablet 24 Hr TAKE 1 TABLET BY MOUTH EVERY DAY 90 tablet 1       Review of Systems:  General:  Fatigue.  Feels well.    Respiratory:  Denies SOB, denies cough  Cardiac:  Denies chest pain, heart palpitations  Abdomen:  Denies constipation, diarrhea.  Denies pain.    Psych:  No complaints.  Sleeping well    Palliative Performance Scale:   100%    Physical Examination:  General: Patient is alert and oriented, not in acute  distress.  Respiratory:  Normal excursions and effort  Cardiac: Regular rate   Abdomen: Soft, non tender   Musculoskeletal: Normal gait.  Psych:  Mood/Affect appropriate    Advanced Directives Discussed and Completed:     HCPOA/Health Surrogate:    There is a completed HCPOA documentation on file in Epic.    POLST Discussed and Completed:     Palliative Care:  Pain is well controlled with morphine and norco  Will increase gabapentin to TID to better help JUSTIN  Will stop baclofen and trial cyclobenzaprine for back muscle spasms    Impression/Plan:   1. Neoplasm related pain  gabapentin 300 mg TID   Morphine ER 15 mg every 12 hours.    Norco 5-10mg Q 4 prn   cannabis      Planned Follow up:  3 weeks      Encounter Times  Reviewing/Obtainin minutes    Medical Exam:   minutes      Plan:   5 minutes    Notes: 5 minutes      Counseling/Education: 20 minutes    Care Coordination:  minutes      My total time spent caring for the patient on the day of the encounter: 35 minutes.        Electronically Signed by:  ARTEMIO TERRY Outpatient Palliative Nurse Practitioner

## 2024-03-13 ENCOUNTER — TELEPHONE (OUTPATIENT)
Dept: FAMILY MEDICINE CLINIC | Facility: CLINIC | Age: 54
End: 2024-03-13

## 2024-03-13 NOTE — TELEPHONE ENCOUNTER
Problems   The patient or proxy has not reviewed this information.     Medications   The patient or proxy has not reviewed this information, and there are updates pending:   Requested Medication Removals Start Date Reported By  Comments   metRONIDAZOLE 500 MG Tabs 3/9/2024 Ronen Cortez Jr.    cefadroxil 500 MG Caps 3/9/2024 Ronen Cortez Jr.      Allergies   The patient or proxy has not reviewed this information.

## 2024-03-14 ENCOUNTER — APPOINTMENT (OUTPATIENT)
Dept: CT IMAGING | Facility: HOSPITAL | Age: 54
End: 2024-03-14
Attending: STUDENT IN AN ORGANIZED HEALTH CARE EDUCATION/TRAINING PROGRAM
Payer: COMMERCIAL

## 2024-03-14 ENCOUNTER — APPOINTMENT (OUTPATIENT)
Dept: GENERAL RADIOLOGY | Facility: HOSPITAL | Age: 54
End: 2024-03-14
Attending: EMERGENCY MEDICINE
Payer: COMMERCIAL

## 2024-03-14 ENCOUNTER — HOSPITAL ENCOUNTER (INPATIENT)
Facility: HOSPITAL | Age: 54
LOS: 2 days | Discharge: HOME | End: 2024-03-16
Attending: EMERGENCY MEDICINE | Admitting: HOSPITALIST
Payer: COMMERCIAL

## 2024-03-14 DIAGNOSIS — D50.9 IRON DEFICIENCY ANEMIA, UNSPECIFIED IRON DEFICIENCY ANEMIA TYPE: ICD-10-CM

## 2024-03-14 DIAGNOSIS — C25.9 PANCREATIC CARCINOMA (HCC): ICD-10-CM

## 2024-03-14 DIAGNOSIS — R19.7 NAUSEA VOMITING AND DIARRHEA: Primary | ICD-10-CM

## 2024-03-14 DIAGNOSIS — C15.9 ESOPHAGEAL CARCINOMA (HCC): ICD-10-CM

## 2024-03-14 DIAGNOSIS — R11.2 NAUSEA VOMITING AND DIARRHEA: Primary | ICD-10-CM

## 2024-03-14 DIAGNOSIS — J86.0: ICD-10-CM

## 2024-03-14 DIAGNOSIS — R79.89 AZOTEMIA: ICD-10-CM

## 2024-03-14 LAB
ALBUMIN SERPL-MCNC: 3.1 G/DL (ref 3.4–5)
ALBUMIN/GLOB SERPL: 0.8 {RATIO} (ref 1–2)
ALP LIVER SERPL-CCNC: 92 U/L
ALT SERPL-CCNC: 31 U/L
ANION GAP SERPL CALC-SCNC: 2 MMOL/L (ref 0–18)
AST SERPL-CCNC: 29 U/L (ref 15–37)
BASOPHILS # BLD AUTO: 0.02 X10(3) UL (ref 0–0.2)
BASOPHILS NFR BLD AUTO: 0.6 %
BILIRUB SERPL-MCNC: 0.4 MG/DL (ref 0.1–2)
BUN BLD-MCNC: 13 MG/DL (ref 9–23)
CALCIUM BLD-MCNC: 9.1 MG/DL (ref 8.5–10.1)
CHLORIDE SERPL-SCNC: 105 MMOL/L (ref 98–112)
CO2 SERPL-SCNC: 27 MMOL/L (ref 21–32)
CREAT BLD-MCNC: 0.52 MG/DL
EGFRCR SERPLBLD CKD-EPI 2021: 120 ML/MIN/1.73M2 (ref 60–?)
EOSINOPHIL # BLD AUTO: 0.1 X10(3) UL (ref 0–0.7)
EOSINOPHIL NFR BLD AUTO: 3 %
ERYTHROCYTE [DISTWIDTH] IN BLOOD BY AUTOMATED COUNT: 15.3 %
GLOBULIN PLAS-MCNC: 4.1 G/DL (ref 2.8–4.4)
GLUCOSE BLD-MCNC: 107 MG/DL (ref 70–99)
HCT VFR BLD AUTO: 34.8 %
HGB BLD-MCNC: 11.1 G/DL
IMM GRANULOCYTES # BLD AUTO: 0.03 X10(3) UL (ref 0–1)
IMM GRANULOCYTES NFR BLD: 0.9 %
LIPASE SERPL-CCNC: 13 U/L (ref 13–75)
LYMPHOCYTES # BLD AUTO: 0.35 X10(3) UL (ref 1–4)
LYMPHOCYTES NFR BLD AUTO: 10.6 %
MCH RBC QN AUTO: 28.6 PG (ref 26–34)
MCHC RBC AUTO-ENTMCNC: 31.9 G/DL (ref 31–37)
MCV RBC AUTO: 89.7 FL
MONOCYTES # BLD AUTO: 0.33 X10(3) UL (ref 0.1–1)
MONOCYTES NFR BLD AUTO: 10 %
NEUTROPHILS # BLD AUTO: 2.46 X10 (3) UL (ref 1.5–7.7)
NEUTROPHILS # BLD AUTO: 2.46 X10(3) UL (ref 1.5–7.7)
NEUTROPHILS NFR BLD AUTO: 74.9 %
OSMOLALITY SERPL CALC.SUM OF ELEC: 279 MOSM/KG (ref 275–295)
PLATELET # BLD AUTO: 157 10(3)UL (ref 150–450)
POTASSIUM SERPL-SCNC: 4 MMOL/L (ref 3.5–5.1)
PROT SERPL-MCNC: 7.2 G/DL (ref 6.4–8.2)
RBC # BLD AUTO: 3.88 X10(6)UL
SODIUM SERPL-SCNC: 134 MMOL/L (ref 136–145)
WBC # BLD AUTO: 3.3 X10(3) UL (ref 4–11)

## 2024-03-14 PROCEDURE — 74177 CT ABD & PELVIS W/CONTRAST: CPT | Performed by: STUDENT IN AN ORGANIZED HEALTH CARE EDUCATION/TRAINING PROGRAM

## 2024-03-14 PROCEDURE — 71045 X-RAY EXAM CHEST 1 VIEW: CPT | Performed by: EMERGENCY MEDICINE

## 2024-03-14 PROCEDURE — 99223 1ST HOSP IP/OBS HIGH 75: CPT | Performed by: STUDENT IN AN ORGANIZED HEALTH CARE EDUCATION/TRAINING PROGRAM

## 2024-03-14 RX ORDER — ENOXAPARIN SODIUM 100 MG/ML
40 INJECTION SUBCUTANEOUS DAILY
Status: DISCONTINUED | OUTPATIENT
Start: 2024-03-15 | End: 2024-03-16

## 2024-03-14 RX ORDER — METOCLOPRAMIDE HYDROCHLORIDE 5 MG/ML
10 INJECTION INTRAMUSCULAR; INTRAVENOUS ONCE
Status: COMPLETED | OUTPATIENT
Start: 2024-03-14 | End: 2024-03-14

## 2024-03-14 RX ORDER — SENNOSIDES 8.6 MG
17.2 TABLET ORAL NIGHTLY PRN
Status: DISCONTINUED | OUTPATIENT
Start: 2024-03-14 | End: 2024-03-16

## 2024-03-14 RX ORDER — HYDROCODONE BITARTRATE AND ACETAMINOPHEN 5; 325 MG/1; MG/1
2 TABLET ORAL EVERY 4 HOURS PRN
Status: DISCONTINUED | OUTPATIENT
Start: 2024-03-14 | End: 2024-03-16

## 2024-03-14 RX ORDER — HYDROMORPHONE HYDROCHLORIDE 1 MG/ML
0.5 INJECTION, SOLUTION INTRAMUSCULAR; INTRAVENOUS; SUBCUTANEOUS EVERY 30 MIN PRN
Status: ACTIVE | OUTPATIENT
Start: 2024-03-14 | End: 2024-03-14

## 2024-03-14 RX ORDER — VANCOMYCIN HYDROCHLORIDE 125 MG/1
125 CAPSULE ORAL EVERY 6 HOURS
Status: DISCONTINUED | OUTPATIENT
Start: 2024-03-14 | End: 2024-03-16

## 2024-03-14 RX ORDER — ECHINACEA PURPUREA EXTRACT 125 MG
1 TABLET ORAL
Status: DISCONTINUED | OUTPATIENT
Start: 2024-03-14 | End: 2024-03-16

## 2024-03-14 RX ORDER — GABAPENTIN 300 MG/1
300 CAPSULE ORAL 2 TIMES DAILY
Status: DISCONTINUED | OUTPATIENT
Start: 2024-03-14 | End: 2024-03-16

## 2024-03-14 RX ORDER — MELATONIN
3 NIGHTLY PRN
Status: DISCONTINUED | OUTPATIENT
Start: 2024-03-14 | End: 2024-03-16

## 2024-03-14 RX ORDER — PANTOPRAZOLE SODIUM 40 MG/1
40 TABLET, DELAYED RELEASE ORAL
Status: DISCONTINUED | OUTPATIENT
Start: 2024-03-15 | End: 2024-03-16

## 2024-03-14 RX ORDER — SODIUM CHLORIDE 9 MG/ML
1000 INJECTION, SOLUTION INTRAVENOUS ONCE
Status: DISCONTINUED | OUTPATIENT
Start: 2024-03-14 | End: 2024-03-16

## 2024-03-14 RX ORDER — ONDANSETRON 2 MG/ML
4 INJECTION INTRAMUSCULAR; INTRAVENOUS EVERY 4 HOURS PRN
Status: DISCONTINUED | OUTPATIENT
Start: 2024-03-14 | End: 2024-03-14

## 2024-03-14 RX ORDER — SERTRALINE HYDROCHLORIDE 100 MG/1
200 TABLET, FILM COATED ORAL DAILY
Status: DISCONTINUED | OUTPATIENT
Start: 2024-03-14 | End: 2024-03-16

## 2024-03-14 RX ORDER — BENZONATATE 100 MG/1
200 CAPSULE ORAL 3 TIMES DAILY PRN
Status: DISCONTINUED | OUTPATIENT
Start: 2024-03-14 | End: 2024-03-16

## 2024-03-14 RX ORDER — CYCLOBENZAPRINE HCL 5 MG
5 TABLET ORAL 3 TIMES DAILY PRN
Status: DISCONTINUED | OUTPATIENT
Start: 2024-03-14 | End: 2024-03-16

## 2024-03-14 RX ORDER — HYDROMORPHONE HYDROCHLORIDE 1 MG/ML
0.5 INJECTION, SOLUTION INTRAMUSCULAR; INTRAVENOUS; SUBCUTANEOUS EVERY 30 MIN PRN
Status: COMPLETED | OUTPATIENT
Start: 2024-03-14 | End: 2024-03-14

## 2024-03-14 RX ORDER — IPRATROPIUM BROMIDE AND ALBUTEROL SULFATE 2.5; .5 MG/3ML; MG/3ML
3 SOLUTION RESPIRATORY (INHALATION) ONCE
Status: COMPLETED | OUTPATIENT
Start: 2024-03-14 | End: 2024-03-14

## 2024-03-14 RX ORDER — PROCHLORPERAZINE EDISYLATE 5 MG/ML
5 INJECTION INTRAMUSCULAR; INTRAVENOUS EVERY 8 HOURS PRN
Status: DISCONTINUED | OUTPATIENT
Start: 2024-03-14 | End: 2024-03-16

## 2024-03-14 RX ORDER — CEFAZOLIN SODIUM/WATER 2 G/20 ML
2 SYRINGE (ML) INTRAVENOUS ONCE
Status: COMPLETED | OUTPATIENT
Start: 2024-03-14 | End: 2024-03-14

## 2024-03-14 RX ORDER — ONDANSETRON 2 MG/ML
4 INJECTION INTRAMUSCULAR; INTRAVENOUS ONCE
Status: COMPLETED | OUTPATIENT
Start: 2024-03-14 | End: 2024-03-14

## 2024-03-14 RX ORDER — METOPROLOL SUCCINATE 50 MG/1
50 TABLET, EXTENDED RELEASE ORAL DAILY
Status: DISCONTINUED | OUTPATIENT
Start: 2024-03-14 | End: 2024-03-16

## 2024-03-14 RX ORDER — MORPHINE SULFATE 2 MG/ML
1 INJECTION, SOLUTION INTRAMUSCULAR; INTRAVENOUS ONCE
Status: COMPLETED | OUTPATIENT
Start: 2024-03-14 | End: 2024-03-14

## 2024-03-14 RX ORDER — BISACODYL 10 MG
10 SUPPOSITORY, RECTAL RECTAL
Status: DISCONTINUED | OUTPATIENT
Start: 2024-03-14 | End: 2024-03-16

## 2024-03-14 RX ORDER — POLYETHYLENE GLYCOL 3350 17 G/17G
17 POWDER, FOR SOLUTION ORAL DAILY PRN
Status: DISCONTINUED | OUTPATIENT
Start: 2024-03-14 | End: 2024-03-16

## 2024-03-14 RX ORDER — MORPHINE SULFATE 15 MG/1
15 TABLET, FILM COATED, EXTENDED RELEASE ORAL EVERY 12 HOURS SCHEDULED
Status: DISCONTINUED | OUTPATIENT
Start: 2024-03-14 | End: 2024-03-16

## 2024-03-14 RX ORDER — ACETAMINOPHEN 325 MG/1
650 TABLET ORAL EVERY 4 HOURS PRN
Status: DISCONTINUED | OUTPATIENT
Start: 2024-03-14 | End: 2024-03-16

## 2024-03-14 RX ORDER — SODIUM CHLORIDE 9 MG/ML
INJECTION, SOLUTION INTRAVENOUS CONTINUOUS
Status: ACTIVE | OUTPATIENT
Start: 2024-03-14 | End: 2024-03-14

## 2024-03-14 RX ORDER — ONDANSETRON 2 MG/ML
4 INJECTION INTRAMUSCULAR; INTRAVENOUS EVERY 6 HOURS PRN
Status: DISCONTINUED | OUTPATIENT
Start: 2024-03-14 | End: 2024-03-16

## 2024-03-14 RX ORDER — SODIUM CHLORIDE, SODIUM LACTATE, POTASSIUM CHLORIDE, CALCIUM CHLORIDE 600; 310; 30; 20 MG/100ML; MG/100ML; MG/100ML; MG/100ML
INJECTION, SOLUTION INTRAVENOUS CONTINUOUS
Status: DISCONTINUED | OUTPATIENT
Start: 2024-03-14 | End: 2024-03-16

## 2024-03-14 RX ORDER — METRONIDAZOLE 500 MG/100ML
500 INJECTION, SOLUTION INTRAVENOUS ONCE
Status: COMPLETED | OUTPATIENT
Start: 2024-03-14 | End: 2024-03-14

## 2024-03-14 RX ORDER — HYDROCODONE BITARTRATE AND ACETAMINOPHEN 5; 325 MG/1; MG/1
1 TABLET ORAL EVERY 4 HOURS PRN
Status: DISCONTINUED | OUTPATIENT
Start: 2024-03-14 | End: 2024-03-16

## 2024-03-14 NOTE — ED INITIAL ASSESSMENT (HPI)
Patient here for n/v/d that started last night. Was admitted recently for the same symptoms. Patient states there is blood in the emesis. Endorsing shortness of breath when he coughs.

## 2024-03-14 NOTE — ED PROVIDER NOTES
Patient Seen in: [facility] Emergency Department      History     Chief Complaint   Patient presents with    Nausea/Vomiting/Diarrhea    Difficulty Breathing     Stated Complaint: DC saturday, vomting and BECKY    Subjective:   HPI    53-year-old male presents to the emergency department for evaluation of intractable vomiting and diarrhea over the past 24 hours.  No other household members are ill and the patient denies any other known exposures.  He has a complicated history of esophageal and pancreatic carcinomas with a known recent acquired bronchoesophageal fistula that has been causing aspiration pneumonia.  The patient was hospitalized for 3 days beginning 8 days ago for aspiration pneumonia and is supposed to be taking the antibiotics but he is unable to he has been unable to keep them down with the vomiting.  He is also complaining of some upper abdominal pain.  No fever.  No shortness of breath.    Objective:   Past Medical History:   Diagnosis Date    Back problem     Belching 02/01/2022    Black stools 02/01/2022    Borderline diabetes     Dx in 8/2013 - HgA1C 6.2%    C. difficile diarrhea 10/23/2022    pt treated and without symptoms    Chest pain 02/01/2022    Coronary artery disease     On 8/16/13: CABG x 4 with LIMA to LAD and SVG to diagonal, OM and PDA    Decorative tattoo 01/01/2000    Depression     Difficult intubation     h/o esophagectomy for CA; developed esophagobronchial vistula    Disorder of liver     LIVER CA    Esophageal cancer (HCC)     completed chemo    Esophageal reflux     Essential hypertension     Exposure to medical diagnostic radiation     last tx 8/18/2022    Frequent urination 01/01/2013    Gastroparesis     Heartburn 02/01/2022    High blood pressure     High cholesterol 08/01/2013    Found when I had quadruple bypass    History of COVID-19 10/16/2022    asymptomatic - pt was dx during a hospitalization for another diagnosis. No continued symptoms    History of stomach  ulcers     Hyperlipidemia     Hyperlipidemia LDL goal < 70     Indigestion 02/01/2022    Morbid obesity with BMI of 40.0-44.9, adult (HCC)     Muscle weakness     Nausea vomiting and diarrhea 3/14/2024    Nontoxic multinodular goiter     Dx in 8/2013: pt was told that imaging showed thyroid cysts per PCP    Pancreatic cancer (HCC)     last dose 12/4/2023 is scheduled for another round 12/27/23    Peripheral vascular disease (HCC)     pt denies    Personal history of antineoplastic chemotherapy     for esophageal cancer/completed    Personal history of antineoplastic chemotherapy 12/2023    pancreatic cancer    Problems with swallowing 02/01/2022    Pulmonary nodules     Dx in 8/2013: CT chest showed small bilateral fissural-based lung nodules less than 1 cm    S/P CABG x 4     On 8/16/13: CABG x 4 with LIMA to LAD and SVG to diagonal, OM and PDA    Shortness of breath     when coughing; no oxygen    Vomiting 02/01/2022              Past Surgical History:   Procedure Laterality Date    APPENDECTOMY      APPENDECTOMY      ARTHROSCOPY OF JOINT UNLISTED      right shoulder    CABG      On 8/16/13: CABG x 4 with LIMA to LAD and SVG to diagonal, OM and PDA    CARDIAC CATH LAB      On 8/14/2013: cardiac cath showed 3-vessel disease    OTHER SURGICAL HISTORY      1.       Laparoscopic robotic-assisted esophagogastrectomy.    PORT, INDWELLING, IMP                  Social History     Socioeconomic History    Marital status:     Number of children: 3   Occupational History    Occupation: works as  - on workman's comp   Tobacco Use    Smoking status: Former     Packs/day: 1.00     Years: 27.00     Additional pack years: 0.00     Total pack years: 27.00     Types: Cigarettes     Quit date: 8/15/2013     Years since quitting: 10.5    Smokeless tobacco: Never    Tobacco comments:     Quit smoking 2013   Vaping Use    Vaping Use: Never used   Substance and Sexual Activity    Alcohol use: Not Currently    Drug  use: Never    Sexual activity: Not Currently     Partners: Female   Other Topics Concern    Caffeine Concern Yes    Stress Concern No    Weight Concern No    Special Diet No    Exercise No    Seat Belt No   Social History Narrative    Lives with life partner    Has 3 daughters - 1 lives closeby, 1 in [place], 1 in [place]     Social Determinants of Health     Financial Resource Strain: Low Risk  (12/14/2023)    Financial Resource Strain     Difficulty of Paying Living Expenses: Not very hard     Med Affordability: No   Food Insecurity: No Food Insecurity (3/14/2024)    Food Insecurity     Food Insecurity: Never true   Transportation Needs: No Transportation Needs (3/14/2024)    Transportation Needs     Lack of Transportation: No   Housing Stability: Low Risk  (3/14/2024)    Housing Stability     Housing Instability: No     Housing Instability Emergency: No              Review of Systems    Positive for stated complaint: DC saturday, vomting and BECKY  Other systems are as noted in HPI.  Constitutional and vital signs reviewed.      All other systems reviewed and negative except as noted above.    Physical Exam     ED Triage Vitals [03/14/24 1605]   /74   Pulse 54   Resp 16   Temp 97.6 °F (36.4 °C)   Temp src Oral   SpO2 95 %   O2 Device None (Room air)       Current:/76   Pulse 50   Temp 98.6 °F (37 °C) (Oral)   Resp 16   Ht 188 cm (6' 2\")   Wt 77.1 kg   SpO2 96%   BMI 21.83 kg/m²         Physical Exam    General appearance: This is a middle-aged male lying on a gurney.  Vital signs were reviewed per nurses notes.  Monitor reveals a sinus rhythm rate in the 50s.  Initial blood pressure was 124/74.  HEENT: Normocephalic atraumatic.  Anicteric sclera.  Oral mucosa is moist.  Oropharynx is normal.  Neck: No adenopathy or thyromegaly.  Lungs good air exchange but end expiratory wheezes.  Heart exam: Normal S1-S2 without extra sounds or murmurs.  Regular rate and rhythm.:  Abdomen is soft and nontender without  masses or rebound.  Extremities are atraumatic.  Skin is dry without rashes or lesions.  Neuroexam: Awake, conversive and moving all 4 extremities well..    ED Course     Labs Reviewed   COMP METABOLIC PANEL (14) - Abnormal; Notable for the following components:       Result Value    Glucose 107 (*)     Sodium 134 (*)     Creatinine 0.52 (*)     Albumin 3.1 (*)     A/G Ratio 0.8 (*)     All other components within normal limits   CBC W/ DIFFERENTIAL - Abnormal; Notable for the following components:    WBC 3.3 (*)     RBC 3.88 (*)     HGB 11.1 (*)     HCT 34.8 (*)     Lymphocyte Absolute 0.35 (*)     All other components within normal limits   LIPASE - Normal   CBC WITH DIFFERENTIAL WITH PLATELET    Narrative:     The following orders were created for panel order CBC With Differential With Platelet.  Procedure                               Abnormality         Status                     ---------                               -----------         ------                     CBC W/ DIFFERENTIAL[[id number]]          Abnormal            Final result                 Please view results for these tests on the individual orders.   COMP METABOLIC PANEL (14)   MAGNESIUM   PHOSPHORUS   CBC WITH DIFFERENTIAL WITH PLATELET   PROTHROMBIN TIME (PT)   C. DIFFICILE(TOXIGENIC)PCR   OCCULT BLOOD, STOOL   STOOL CULTURE W/SHIGATOXIN   GI STOOL PANEL BY PCR     EKG    Rate, intervals and axes as noted on EKG Report.  Rate: 52  Rhythm: Sinus bradycardia  Reading: No EKG.  Agree with EKG report.                 Intravenous access was obtained.  Laboratory studies were drawn.  IV fluids, analgesics and antiemetics as well as IV Protonix were administered.  Review of the electronic medical record indicates that the patient was discharged home on Keflex and Flagyl.  Intravenous doses of Ancef and metronidazole were administered.    The patient continued to feel nauseated and have occasional vomiting.    Case was discussed with [facility] hospitalist  Dr. Myles.    Test results and treatment plan including hospitalization for antibiotics was discussed with the patient.    XR CHEST AP PORTABLE  (CPT=71045)    Result Date: 3/14/2024  PROCEDURE:  XR CHEST AP PORTABLE  (CPT=71045)  TECHNIQUE:  AP chest radiograph was obtained.  COMPARISON:  [facility] , CT, CT CHEST+ABDOMEN (ALL CNTRST ONLY) (ZYD=[id number]), 3/06/2024, 4:42 PM.  [facility] , XR, XR CHEST AP PORTABLE  (CPT=71045), 3/06/2024, 9:28 PM.  INDICATIONS:  DC saturday, vomting and BECKY  PATIENT STATED HISTORY: (As transcribed by Technologist)  Patient stated having difficulty breathing today.    FINDINGS:  CT compatible left chest port tip at the cavoatrial junction.  Borderline heart size with normal pulmonary vascularity.  Small right pleural effusion versus pleural scarring.  Mild scarring/atelectasis in the lower lungs, with underlying infiltrates not excluded.  Clinical correlation recommended.  No pneumothorax.  Stable scattered surgical clips project over the mediastinum with changes related to previous gastric pull-through.            CONCLUSION:  Small right pleural effusion versus pleural scarring.  Mild scarring/atelectasis in the lower lungs, with underlying infiltrates not excluded.  Clinical correlation recommended.    LOCATION:  [facility]      Dictated by (CST): Immanuel Matthews MD on 3/14/2024 at 6:35 PM     Finalized by (CST): Immanuel Matthews MD on 3/14/2024 at 6:36 PM       I personally reviewed the images myself and went over results with patient.    The chest x-ray films myself and there is a small right pleural effusion but no evidence of pneumonia or pneumothorax.       MDM      1.  Nausea vomiting and diarrhea.  Etiology is uncertain.  Patient did receive chemotherapy 2 days ago but has had this regimen before without GI complaints.  IV hide rehydration and antiemetics administered.  2.  Esophageal carcinoma.  3.  Pancreatic carcinoma.  4.  Bronchoesophageal fistula.  Chest x-ray shows no new  infiltrates.  Intravenous doses of the patient's oral antibiotics for treatment of aspiration pneumonia were continued.  5.  Anemia.  6.  Azotemia.  Admission disposition: 3/14/2024  7:26 PM                                        Medical Decision Making      Disposition and Plan     Clinical Impression:  1. Nausea vomiting and diarrhea    2. Esophageal carcinoma (HCC)    3. Pancreatic carcinoma (HCC)    4. Broncho-esophageal fistula (HCC)    5. Iron deficiency anemia, unspecified iron deficiency anemia type    6. Azotemia         Disposition:  Admit  3/14/2024  7:26 pm    Follow-up:  No follow-up provider specified.        Medications Prescribed:  Current Discharge Medication List                            Hospital Problems       Present on Admission  Date Reviewed: 3/12/2024            ICD-10-CM Noted POA    * (Principal) Nausea vomiting and diarrhea R11.2, R19.7 3/14/2024 Unknown    Azotemia R79.89 3/14/2024 Unknown    Broncho-esophageal fistula (HCC) J86.0 3/14/2024 Unknown    Coronary artery disease involving coronary bypass graft of native heart with angina pectoris (HCC) I25.709 Unknown Yes    Overview Addendum 8/17/2013  5:49 PM by Anyi Vernon     On 8/16/13: CABG x 4 with LIMA to LAD and SVG to diagonal, OM and PDA         Esophageal carcinoma (HCC) C15.9 11/6/2023 Unknown    Iron deficiency anemia, unspecified iron deficiency anemia type D50.9 3/14/2024 Unknown    Pancreatic carcinoma (HCC) C25.9 12/10/2023 Yes

## 2024-03-15 ENCOUNTER — ANESTHESIA EVENT (OUTPATIENT)
Dept: ENDOSCOPY | Facility: HOSPITAL | Age: 54
End: 2024-03-15
Payer: COMMERCIAL

## 2024-03-15 ENCOUNTER — HOSPITAL ENCOUNTER (OUTPATIENT)
Dept: GENERAL RADIOLOGY | Facility: HOSPITAL | Age: 54
Discharge: HOME | End: 2024-03-15
Attending: INTERNAL MEDICINE
Payer: COMMERCIAL

## 2024-03-15 ENCOUNTER — ANESTHESIA (OUTPATIENT)
Dept: ENDOSCOPY | Facility: HOSPITAL | Age: 54
End: 2024-03-15
Payer: COMMERCIAL

## 2024-03-15 DIAGNOSIS — Z98.890 HISTORY OF ESOPHAGOGASTRODUODENOSCOPY (EGD): ICD-10-CM

## 2024-03-15 PROBLEM — G89.3 CANCER RELATED PAIN: Status: ACTIVE | Noted: 2024-01-01

## 2024-03-15 PROBLEM — G89.3 CANCER RELATED PAIN: Status: ACTIVE | Noted: 2024-03-15

## 2024-03-15 PROBLEM — Z71.89 GOALS OF CARE, COUNSELING/DISCUSSION: Status: ACTIVE | Noted: 2024-03-15

## 2024-03-15 PROBLEM — Z51.5 PALLIATIVE CARE ENCOUNTER: Status: ACTIVE | Noted: 2024-03-15

## 2024-03-15 PROBLEM — Z51.5 PALLIATIVE CARE ENCOUNTER: Status: ACTIVE | Noted: 2024-01-01

## 2024-03-15 PROBLEM — Z71.89 GOALS OF CARE, COUNSELING/DISCUSSION: Status: ACTIVE | Noted: 2024-01-01

## 2024-03-15 LAB
ALBUMIN SERPL-MCNC: 2.8 G/DL (ref 3.4–5)
ALBUMIN/GLOB SERPL: 0.8 {RATIO} (ref 1–2)
ALP LIVER SERPL-CCNC: 82 U/L
ALT SERPL-CCNC: 24 U/L
ANION GAP SERPL CALC-SCNC: 4 MMOL/L (ref 0–18)
AST SERPL-CCNC: 27 U/L (ref 15–37)
BASOPHILS # BLD AUTO: 0.01 X10(3) UL (ref 0–0.2)
BASOPHILS NFR BLD AUTO: 0.3 %
BILIRUB SERPL-MCNC: 0.4 MG/DL (ref 0.1–2)
BUN BLD-MCNC: 12 MG/DL (ref 9–23)
CALCIUM BLD-MCNC: 9.2 MG/DL (ref 8.5–10.1)
CHLORIDE SERPL-SCNC: 104 MMOL/L (ref 98–112)
CO2 SERPL-SCNC: 27 MMOL/L (ref 21–32)
CREAT BLD-MCNC: 0.48 MG/DL
EGFRCR SERPLBLD CKD-EPI 2021: 123 ML/MIN/1.73M2 (ref 60–?)
EOSINOPHIL # BLD AUTO: 0.2 X10(3) UL (ref 0–0.7)
EOSINOPHIL NFR BLD AUTO: 5.1 %
ERYTHROCYTE [DISTWIDTH] IN BLOOD BY AUTOMATED COUNT: 15.2 %
GLOBULIN PLAS-MCNC: 3.7 G/DL (ref 2.8–4.4)
GLUCOSE BLD-MCNC: 100 MG/DL (ref 70–99)
HCT VFR BLD AUTO: 32.5 %
HGB BLD-MCNC: 10.6 G/DL
IMM GRANULOCYTES # BLD AUTO: 0.01 X10(3) UL (ref 0–1)
IMM GRANULOCYTES NFR BLD: 0.3 %
INR BLD: 1.21 (ref 0.8–1.2)
LYMPHOCYTES # BLD AUTO: 0.28 X10(3) UL (ref 1–4)
LYMPHOCYTES NFR BLD AUTO: 7.1 %
MAGNESIUM SERPL-MCNC: 1.9 MG/DL (ref 1.6–2.6)
MCH RBC QN AUTO: 29.5 PG (ref 26–34)
MCHC RBC AUTO-ENTMCNC: 32.6 G/DL (ref 31–37)
MCV RBC AUTO: 90.5 FL
MONOCYTES # BLD AUTO: 0.29 X10(3) UL (ref 0.1–1)
MONOCYTES NFR BLD AUTO: 7.3 %
NEUTROPHILS # BLD AUTO: 3.17 X10 (3) UL (ref 1.5–7.7)
NEUTROPHILS # BLD AUTO: 3.17 X10(3) UL (ref 1.5–7.7)
NEUTROPHILS NFR BLD AUTO: 79.9 %
OSMOLALITY SERPL CALC.SUM OF ELEC: 280 MOSM/KG (ref 275–295)
PHOSPHATE SERPL-MCNC: 2.9 MG/DL (ref 2.5–4.9)
PLATELET # BLD AUTO: 140 10(3)UL (ref 150–450)
POTASSIUM SERPL-SCNC: 3.8 MMOL/L (ref 3.5–5.1)
PROT SERPL-MCNC: 6.5 G/DL (ref 6.4–8.2)
PROTHROMBIN TIME: 15.3 SECONDS (ref 11.6–14.8)
RBC # BLD AUTO: 3.59 X10(6)UL
SODIUM SERPL-SCNC: 135 MMOL/L (ref 136–145)
WBC # BLD AUTO: 4 X10(3) UL (ref 4–11)

## 2024-03-15 PROCEDURE — 74360 X-RAY GUIDE GI DILATION: CPT | Performed by: INTERNAL MEDICINE

## 2024-03-15 PROCEDURE — 0DQ38ZZ REPAIR LOWER ESOPHAGUS, VIA NATURAL OR ARTIFICIAL OPENING ENDOSCOPIC: ICD-10-PCS | Performed by: INTERNAL MEDICINE

## 2024-03-15 PROCEDURE — 99223 1ST HOSP IP/OBS HIGH 75: CPT | Performed by: NURSE PRACTITIONER

## 2024-03-15 PROCEDURE — 99233 SBSQ HOSP IP/OBS HIGH 50: CPT | Performed by: INTERNAL MEDICINE

## 2024-03-15 PROCEDURE — 99232 SBSQ HOSP IP/OBS MODERATE 35: CPT | Performed by: HOSPITALIST

## 2024-03-15 PROCEDURE — 0D738DZ DILATION OF LOWER ESOPHAGUS WITH INTRALUMINAL DEVICE, VIA NATURAL OR ARTIFICIAL OPENING ENDOSCOPIC: ICD-10-PCS | Performed by: INTERNAL MEDICINE

## 2024-03-15 DEVICE — FULL COVERED METALLIC ESOPHAGUS STENT
Type: IMPLANTABLE DEVICE | Site: ESOPHAGUS | Status: FUNCTIONAL
Brand: HANAROSTENT® ESOPHAGUS TTS(CCC)

## 2024-03-15 DEVICE — IMPLANTABLE DEVICE: Type: IMPLANTABLE DEVICE | Status: FUNCTIONAL

## 2024-03-15 RX ORDER — MORPHINE SULFATE 4 MG/ML
4 INJECTION, SOLUTION INTRAMUSCULAR; INTRAVENOUS EVERY 2 HOUR PRN
Status: DISCONTINUED | OUTPATIENT
Start: 2024-03-15 | End: 2024-03-16

## 2024-03-15 RX ORDER — OLANZAPINE 5 MG/1
5 TABLET ORAL NIGHTLY
Qty: 30 TABLET | Refills: 3 | Status: SHIPPED | OUTPATIENT
Start: 2024-03-15

## 2024-03-15 RX ORDER — NALOXONE HYDROCHLORIDE 0.4 MG/ML
0.08 INJECTION, SOLUTION INTRAMUSCULAR; INTRAVENOUS; SUBCUTANEOUS AS NEEDED
Status: DISCONTINUED | OUTPATIENT
Start: 2024-03-15 | End: 2024-03-15 | Stop reason: HOSPADM

## 2024-03-15 RX ORDER — PROCHLORPERAZINE EDISYLATE 5 MG/ML
5 INJECTION INTRAMUSCULAR; INTRAVENOUS EVERY 8 HOURS PRN
Status: DISCONTINUED | OUTPATIENT
Start: 2024-03-15 | End: 2024-03-15 | Stop reason: HOSPADM

## 2024-03-15 RX ORDER — HYDROMORPHONE HYDROCHLORIDE 1 MG/ML
0.4 INJECTION, SOLUTION INTRAMUSCULAR; INTRAVENOUS; SUBCUTANEOUS EVERY 5 MIN PRN
Status: DISCONTINUED | OUTPATIENT
Start: 2024-03-15 | End: 2024-03-15 | Stop reason: HOSPADM

## 2024-03-15 RX ORDER — SODIUM CHLORIDE, SODIUM LACTATE, POTASSIUM CHLORIDE, CALCIUM CHLORIDE 600; 310; 30; 20 MG/100ML; MG/100ML; MG/100ML; MG/100ML
INJECTION, SOLUTION INTRAVENOUS CONTINUOUS
Status: DISCONTINUED | OUTPATIENT
Start: 2024-03-15 | End: 2024-03-15 | Stop reason: HOSPADM

## 2024-03-15 RX ORDER — HYDROMORPHONE HYDROCHLORIDE 1 MG/ML
0.2 INJECTION, SOLUTION INTRAMUSCULAR; INTRAVENOUS; SUBCUTANEOUS EVERY 5 MIN PRN
Status: DISCONTINUED | OUTPATIENT
Start: 2024-03-15 | End: 2024-03-15 | Stop reason: HOSPADM

## 2024-03-15 RX ORDER — MORPHINE SULFATE 2 MG/ML
2 INJECTION, SOLUTION INTRAMUSCULAR; INTRAVENOUS EVERY 2 HOUR PRN
Status: DISCONTINUED | OUTPATIENT
Start: 2024-03-15 | End: 2024-03-16

## 2024-03-15 RX ORDER — HYDROCODONE BITARTRATE AND ACETAMINOPHEN 5; 325 MG/1; MG/1
1 TABLET ORAL ONCE AS NEEDED
Status: DISCONTINUED | OUTPATIENT
Start: 2024-03-15 | End: 2024-03-15 | Stop reason: HOSPADM

## 2024-03-15 RX ORDER — HYDROCODONE BITARTRATE AND ACETAMINOPHEN 5; 325 MG/1; MG/1
2 TABLET ORAL ONCE AS NEEDED
Status: DISCONTINUED | OUTPATIENT
Start: 2024-03-15 | End: 2024-03-15 | Stop reason: HOSPADM

## 2024-03-15 RX ORDER — ONDANSETRON 2 MG/ML
4 INJECTION INTRAMUSCULAR; INTRAVENOUS EVERY 6 HOURS PRN
Status: DISCONTINUED | OUTPATIENT
Start: 2024-03-15 | End: 2024-03-15 | Stop reason: HOSPADM

## 2024-03-15 RX ORDER — HYDROMORPHONE HYDROCHLORIDE 1 MG/ML
0.6 INJECTION, SOLUTION INTRAMUSCULAR; INTRAVENOUS; SUBCUTANEOUS EVERY 5 MIN PRN
Status: DISCONTINUED | OUTPATIENT
Start: 2024-03-15 | End: 2024-03-15 | Stop reason: HOSPADM

## 2024-03-15 RX ORDER — MORPHINE SULFATE 2 MG/ML
2 INJECTION, SOLUTION INTRAMUSCULAR; INTRAVENOUS ONCE
Status: COMPLETED | OUTPATIENT
Start: 2024-03-15 | End: 2024-03-15

## 2024-03-15 RX ORDER — MORPHINE SULFATE 2 MG/ML
1 INJECTION, SOLUTION INTRAMUSCULAR; INTRAVENOUS EVERY 2 HOUR PRN
Status: DISCONTINUED | OUTPATIENT
Start: 2024-03-15 | End: 2024-03-16

## 2024-03-15 RX ORDER — ACETAMINOPHEN 500 MG
1000 TABLET ORAL ONCE AS NEEDED
Status: DISCONTINUED | OUTPATIENT
Start: 2024-03-15 | End: 2024-03-15 | Stop reason: HOSPADM

## 2024-03-15 RX ADMIN — SODIUM CHLORIDE, SODIUM LACTATE, POTASSIUM CHLORIDE, CALCIUM CHLORIDE: 600; 310; 30; 20 INJECTION, SOLUTION INTRAVENOUS at 16:43:00

## 2024-03-15 NOTE — OPERATIVE REPORT
Dontae Cortez Jr. Patient Status:  Inpatient    10/15/1970 MRN [id number]   [facility], located within [facility] Attending Jan Brito MD   Date 3/15/2024 PCP Adrian Horowitz MD     PREOPERATIVE DIAGNOSIS/INDICATION: 53 year old male with underlying metastatic malignant process primary esophageal adenocarcinoma and pancreas adenocarcinoma 1ary vs metastatic, post esophagectomy and gastric pull through, developed esophagobronchial fistula 2ary to deep ulceration at distal esophagus/anastomosis, failed endoscopic stenting bronchial and esophageal with persistent fistula, also failed attempt at over the scope clip, with aspiration symptoms, unable to tolerate fluids due to immediate cough, we placed an ASD atrial septal defect occluder for this intention 24,  EGD demonstrated septal occluder in place, now admitted with acute PO intolerance with severe cough  POSTOPERTATIVE DIAGNOSIS: Migrated septal occluder device with active esophagobronchial fistula  PROCEDURE PERFORMED: EGD with esophageal stenting and endoscopic suturing  TIME OUT WAS PERFORMED    SEDATION: MAC sedation provided by General Anesthesia    INFORMED CONSENT: Risks, benefits and alternatives to the procedure were explained to the patient including but not limited to bleeding, infection, perforation, adverse drug reactions, pancreatitis and the need for hospitalization and surgery if this occurs, the patient understands and agrees to procedure.  PROCEDURE DESCRIPTION: A double channel therapeutic upper endoscope was introduced into the patient’s mouth, hypo pharynx, esophagus, stomach and the first and second portion of the duodenum. Careful examination of the above described areas was performed on withdrawal of the endoscope. The patient tolerated the procedure well, there were no immediate complication immediately following the procedure, and the patient was transferred to recovery in stable  condition.  FINDINGS:  ESOPHAGUS: Post surgical changes c/w distal esophagectomy and gastric pull through, just proximal to the anastomosis we identified that the septal occluder device was not in position and likely migrated as not seen on recent CXR and CT abdomen, and demonstrating the Gi side of the esophagobronchial fistula, distal to this we identified a mucosal opening at the anastomosis which ended representing the proximal opening of a mucosal tunnel/bridge  GASTRIC: Post surgical changes and gastric pull through, patent pyloric, post POEM scar at antrum, migrated esophageal stent into gastric lumen  THERAPEUTICS: Under fluoroscopic support and direct visualization we placed overlapping through the scope TaeWong 20 x 60 mm which was sutured in place at the proximal end with 2-0 prolene at two sites to the esophageal wall and a Hanarostent 20 x 100 mm bridging the fistula  RECOMMENDATIONS:   Post EGD precautions, watch for bleeding, infection, perforation, adverse drug reactions   Schedule fistula closure with cardiac septal defect occluder as outpatient  Trial of clears and advance as tolerated    Raheel Ritchie MD  3/15/2024  5:21 PM

## 2024-03-15 NOTE — ED QUICK NOTES
Orders for admission, patient is aware of plan and ready to go upstairs. Any questions, please call ED RN Vesna at extension [phone].     Patient Covid vaccination status: Fully vaccinated and immunocompromised     COVID Test Ordered in ED: None    COVID Suspicion at Admission: N/A    Running Infusions:      Mental Status/LOC at time of transport: A&Ox4    Other pertinent information:   CIWA score: N/A   NIH score:  N/A

## 2024-03-15 NOTE — PROGRESS NOTES
[facility]   part of [facility]     Hospitalist Progress Note     Dontae Buddy Cortez . Patient Status:  Inpatient    10/15/1970 MRN [id number]   [facility] 4NW-A Attending Jan Brito MD   Hosp Day # 1 PCP Adrian Horowitz MD     Chief Complaint: Intractable n/v    Subjective:     Patient still having nausea, mild vomiting of phlegm.  Denies fever, chills.  Pain is controlled.   No other acute complaints.      Objective:    Review of Systems:   A comprehensive review of systems was completed; pertinent positive and negatives stated in subjective.    Vital signs:  Temp:  [97.6 °F (36.4 °C)-98.6 °F (37 °C)] 97.8 °F (36.6 °C)  Pulse:  [50-57] 54  Resp:  [14-18] 18  BP: (122-157)/(70-76) 157/70  SpO2:  [91 %-97 %] 91 %    Physical Exam:    General: No acute distress, pleasant   Respiratory: No wheezes, no rhonchi  Cardiovascular: S1, S2, regular rate and rhythm  Abdomen: Soft, Non-tender, non-distended, positive bowel sounds  Neuro: No new focal deficits.   Extremities: No edema    Diagnostic Data:    Labs:  Recent Labs   Lab 24  0604 24  0925 24  1800 03/15/24  0643   WBC 4.1 4.2 3.3* 4.0   HGB 12.3* 10.5* 11.1* 10.6*   MCV 91.0 93.7 89.7 90.5   .0 193.0 157.0 140.0*   INR  --   --   --  1.21*       Recent Labs   Lab 24  0925 24  1801 03/15/24  0643   * 107* 100*   BUN 16 13 12   CREATSERUM 0.72 0.52* 0.48*   CA 8.7 9.1 9.2   ALB 3.1* 3.1* 2.8*    134* 135*   K 4.3 4.0 3.8    105 104   CO2 29.0 27.0 27.0   ALKPHO 103 92 82   AST 22 29 27   ALT 26 31 24   BILT 0.2 0.4 0.4   TP 7.0 7.2 6.5       Estimated Creatinine Clearance: 191.9 mL/min (A) (based on SCr of 0.48 mg/dL (L)).    No results for input(s): \"TROP\", \"TROPHS\", \"CK\" in the last 168 hours.    Recent Labs   Lab 03/15/24  0643   PTP 15.3*   INR 1.21*                  Microbiology    No results found for this visit on 24.      Imaging: Reviewed in Epic.    Medications:     sodium chloride  1,000 mL Intravenous Once    vancomycin  125 mg Oral Q6H    piperacillin-tazobactam  3.375 g Intravenous Q8H    gabapentin  300 mg Oral BID    metoprolol succinate ER  50 mg Oral Daily    morphINE ER  15 mg Oral Q12H    pantoprazole  40 mg Oral BID AC    lipase-protease-amylase (Lip-Prot-Amyl)  25,000 Units Oral TID CC    sertraline  200 mg Oral Daily    enoxaparin  40 mg Subcutaneous Daily       Assessment & Plan:      # Intractable nausea/vomiting  # Diarrheal illness   - Previous hx C diff with recent abx therapy, high risk for recurrent C diff infection   - C diff testing ordered, GI PCR ordered - pending   - IVF, antiemetics PRN   - CT abdomen pelvis ordered   - GI on consult - EGD later today, NPO for now until after procedure    - Empiric Oral Vanc started - on hold while NPO for procedure      # Metastatic esophageal carcinoma  # Metastatic pancreatic carcinoma   - Oncology, GI, Pallative care consulted   - Continue home morphine ER, gabapentin, norco PRN     # Hx recent aspiration PNA   - continue IV abx, end date previously determined to be 3/19   - Continue IV zosyn     # GERD - continue PPI  # CAD s/p CABG  # Hypertension - Hold home oral antihypertensives given softer BP  # Hyperlipidemia - continue home statin   # Hx C diff - stool studies pending  # Depression - continue sertraline       Jan Brito MD    Supplementary Documentation:     Quality:  DVT Mechanical Prophylaxis:   SCDs, Early ambuation  DVT Pharmacologic Prophylaxis   Medication    enoxaparin (Lovenox) 40 MG/0.4ML SUBQ injection 40 mg                Code Status: Full Code  Neumann: No urinary catheter in place  Neumann Duration (in days):   Central line:    SHUBHAM:     Discharge is dependent on: EGD  At this point Mr. Cortez is expected to be discharge to: home    The 21st Century Cures Act makes medical notes like these available to patients in the interest of transparency. Please be advised this is a medical document. Medical  documents are intended to carry relevant information, facts as evident, and the clinical opinion of the practitioner. The medical note is intended as peer to peer communication and may appear blunt or direct. It is written in medical language and may contain abbreviations or verbiage that are unfamiliar.

## 2024-03-15 NOTE — PAYOR COMM NOTE
--------------  ADMISSION REVIEW     Payor: [employer]  Subscriber #:  [id number]  Authorization Number: [id number]    Admit date: 3/14/24  Admit time:  8:43 PM       REVIEW DOCUMENTATION:     ED Provider Notes        Subjective:   HPI    53-year-old male presents to the emergency department for evaluation of intractable vomiting and diarrhea over the past 24 hours.  No other household members are ill and the patient denies any other known exposures.  He has a complicated history of esophageal and pancreatic carcinomas with a known recent acquired bronchoesophageal fistula that has been causing aspiration pneumonia.  The patient was hospitalized for 3 days beginning 8 days ago for aspiration pneumonia and is supposed to be taking the antibiotics but he is unable to he has been unable to keep them down with the vomiting.  He is also complaining of some upper abdominal pain.  No fever.  No shortness of breath.      Physical Exam     ED Triage Vitals [03/14/24 1605]   /74   Pulse 54   Resp 16   Temp 97.6 °F (36.4 °C)   Temp src Oral   SpO2 95 %   O2 Device None (Room air)       Physical Exam    General appearance: This is a middle-aged male lying on a gurney.  Vital signs were reviewed per nurses notes.  Monitor reveals a sinus rhythm rate in the 50s.  Initial blood pressure was 124/74.  HEENT: Normocephalic atraumatic.  Anicteric sclera.  Oral mucosa is moist.  Oropharynx is normal.  Neck: No adenopathy or thyromegaly.  Lungs good air exchange but end expiratory wheezes.  Heart exam: Normal S1-S2 without extra sounds or murmurs.  Regular rate and rhythm.:  Abdomen is soft and nontender without masses or rebound.  Extremities are atraumatic.  Skin is dry without rashes or lesions.  Neuroexam: Awake, conversive and moving all 4 extremities well..    ED Course     Labs Reviewed   COMP METABOLIC PANEL (14) - Abnormal; Notable for the following components:       Result Value    Glucose 107 (*)      Sodium 134 (*)     Creatinine 0.52 (*)     Albumin 3.1 (*)     A/G Ratio 0.8 (*)     All other components within normal limits   CBC W/ DIFFERENTIAL - Abnormal; Notable for the following components:    WBC 3.3 (*)     RBC 3.88 (*)     HGB 11.1 (*)     HCT 34.8 (*)     Lymphocyte Absolute 0.35 (*)     All other components within normal limits   LIPASE - Normal     Intravenous access was obtained.  Laboratory studies were drawn.  IV fluids, analgesics and antiemetics as well as IV Protonix were administered.  Review of the electronic medical record indicates that the patient was discharged home on Keflex and Flagyl.  Intravenous doses of Ancef and metronidazole were administered.    XR CHEST AP PORTABLE  (CPT=71045)    Result Date: 3/14/2024  PROCEDURE:  XR CHEST AP PORTABLE  (CPT=71045)  TECHNIQUE:  AP chest radiograph was obtained.  COMPARISON:  [facility] , CT, CT CHEST+ABDOMEN (ALL CNTRST ONLY) (UIK=16522/51275), 3/06/2024, 4:42 PM.  [facility] , XR, XR CHEST AP PORTABLE  (CPT=71045), 3/06/2024, 9:28 PM.  INDICATIONS:  DC saturday, vomting and BECKY  PATIENT STATED HISTORY: (As transcribed by Technologist)  Patient stated having difficulty breathing today.    FINDINGS:  CT compatible left chest port tip at the cavoatrial junction.  Borderline heart size with normal pulmonary vascularity.  Small right pleural effusion versus pleural scarring.  Mild scarring/atelectasis in the lower lungs, with underlying infiltrates not excluded.  Clinical correlation recommended.  No pneumothorax.  Stable scattered surgical clips project over the mediastinum with changes related to previous gastric pull-through.            CONCLUSION:  Small right pleural effusion versus pleural scarring.  Mild scarring/atelectasis in the lower lungs, with underlying infiltrates not excluded.  Clinical correlation recommended.    LOCATION:  [facility]      Dictated by (CST): Immanuel Matthews MD on 3/14/2024 at 6:35 PM     Finalized by (CST): Immanuel Matthews MD  on 3/14/2024 at 6:36 PM       I personally reviewed the images myself and went over results with patient.    The chest x-ray films myself and there is a small right pleural effusion but no evidence of pneumonia or pneumothorax.      MDM      1.  Nausea vomiting and diarrhea.  Etiology is uncertain.  Patient did receive chemotherapy 2 days ago but has had this regimen before without GI complaints.  IV hide rehydration and antiemetics administered.  2.  Esophageal carcinoma.  3.  Pancreatic carcinoma.  4.  Bronchoesophageal fistula.  Chest x-ray shows no new infiltrates.  Intravenous doses of the patient's oral antibiotics for treatment of aspiration pneumonia were continued.  5.  Anemia.  6.  Azotemia.  Admission disposition: 3/14/2024  7:26 PM      Disposition and Plan     Clinical Impression:  1. Nausea vomiting and diarrhea    2. Esophageal carcinoma (HCC)    3. Pancreatic carcinoma (HCC)    4. Broncho-esophageal fistula (HCC)    5. Iron deficiency anemia, unspecified iron deficiency anemia type    6. Azotemia         Disposition:  Admit  3/14/2024  7:26 pm        Hospital Problems       Present on Admission  Date Reviewed: 3/12/2024            ICD-10-CM Noted POA    * (Principal) Nausea vomiting and diarrhea R11.2, R19.7 3/14/2024 Unknown    Azotemia R79.89 3/14/2024 Unknown    Broncho-esophageal fistula (HCC) J86.0 3/14/2024 Unknown    Coronary artery disease involving coronary bypass graft of native heart with angina pectoris (HCC) I25.709 Unknown Yes    Overview Addendum 2013  5:49 PM by Anyi Vernon     On 13: CABG x 4 with LIMA to LAD and SVG to diagonal, OM and PDA         Esophageal carcinoma (HCC) C15.9 2023 Unknown    Iron deficiency anemia, unspecified iron deficiency anemia type D50.9 3/14/2024 Unknown    Pancreatic carcinoma (HCC) C25.9 12/10/2023 Yes               History and Physical            Dontae Cortez Jr. Patient Status:  Emergency    10/15/1970 MRN [id number]    Location [facility] EMERGENCY DEPARTMENT Attending Lary Bernstein MD   Hosp Day # 0 PCP Adrian Horowitz MD      Chief Complaint: Intractable nausea/vomiting     History of Present Illness: Dontae Cortez Jr. is a 53 year old male with PMHx metastatic esophageal carcinoma s/p resection and gastroesophagostomy, metastatic pancreatitic carcinoma, CAD s/p CABG, HTN, HLD, Hx prior C diff who presents with intractable nausea/vomiting.     He was in normal state of health when he started having sudden onset nausea and vomiting yesterday nonbloody nonbilious. states this was unrelated to food however is not able to tolerate any p.o. intake or pills.  Endorsing diffuse abdominal pain which is also started around the same time.  Notes ongoing bowel movements, still has 3-4 loose watery bowel movements which has not gotten worsened in the past few weeks.  Has a history of prior C. difficile colitis.  Recently admitted for aspiration pneumonia and discharged on oral antibiotic therapy.  Wife notes fevers up to 100.1 at home.  Still having cough and sputum production.    ASSESSMENT / PLAN:      # Intractable nausea/vomiting  # Diarrheal illness   - Previous hx C diff with recent abx therapy, high risk for recurrent C diff infection   - C diff testing ordered, GI PCR ordered   - CLD, ADAT, IVF, antiemetics PRN   - CT abdomen pelvis ordered   - GI on consult    - Empiric Oral Vanc started     # Metastatic esophageal carcinoma  # Metastatic pancreatic carcinoma   - Oncology, GI, Pallative care consulted   - Continue home morphine ER, gabapentin, norco PRN     # Hx recent aspiration PNA   - continue IV abx, end date previously determined to be 3/19     # GERD - continue PPI  # CAD s/p CABG  # Hypertension - Hold home oral antihypertensives given softer BP  # Hyperlipidemia - continue home statin   # Hx C diff - stool studies pending  # Depression - continue sertraline        Code Status: Full Code     Plan of care  discussed with patient, ED physician     Glen Myles MD  3/14/2024         [facility] Progress Note   Length of Stay: 1     Subjective:   -States that he is not nauseated but is having issues with swallowing liquids and solids again. Whenever he swallows he has to spit it back up  -Mildly nauseated      Oncology History   -2018: Per report had a normal EGD and colonoscopy     -June 2022: He met with GI due to new onset dysphagia which started about 1 month prior to his visit.  He had an esophagram with a focal abrupt narrowing with irregular margins in the distal one third of the esophagus extending to the GE junction.  He also had an episode of food impaction. He has also lost about 15 lb. He was a heavy smoker from age 14 to about 40 (1.5 PPD). Also with alcohol use currently. Mother with a history of breast and colon cancer.      -EGD and EUS with Dr. Yadav on 6/7/2022: Severe esophagitis with a concerning mass extending 37-39 centimeters from the incisors. Nonobstructive mass.  By EUS uT3N1 disease.  Pathology showed invasive moderate to poorly differentiated adenocarcinoma.  HER2 amplified by FISH     -PET/CT on 6/20/2022: Increased distal esophageal uptake with an SUV of 14.4.  Concern for shreya metastases.     -Concurrent chemoradiation with carboplatin AUC2 plus paclitaxel 50 mg/m2: 7/6/22-8/10/22:  went from 137 to 30.9.      -PET/CT on 9/6/22: showed overall improvement     -Surgery with Dr. Lu on 9/30/22: ypT1b pN0. Recovery has been complicated by an esophageal leak     -I met with him on 12/12/22. We discussed adjuvant opdivo for 1 year. His  was elevated to 48 and repeat was 64. CT CAP was recommended.      -He was admitted on 12/25/22 for abdominal pain. He had a POEM procedure done. He was also noted to have a RLL consolidation. He was seen by pulmonary, based on imaging an outpatient PET/CT was recommended and a biopsy of the most FDG avid lesion would  be considered. Noted to have CAD and an outpatient evaluation was recommended.      -PET/CT on 1/4/23-imaging reviewed in tumor board and no focal areas of concern and recommendation was to start immunotherapy.      Assessment and Plan:    Metastatic Esophageal cancer, HER2+: He is s/p chemoRT, esophagectomy (9/30/22) c/b esophageal leak. Due to progression, he was started on FOLFOX + Herceptin + Immunotherapy on 5/1/23. He had a great response to combined treatment but unfortunately progressed while on herceptin/immunotherapy alone. This might have also been due to frequent hospitalizations related to his bronchoesophageal fistula. He is now on FOLFOX + Opdivo + Herceptin again. He has received 3 cycles and the last was on 3/12/24. We are planning on a repeat PET after C4. His CT CAP here shows stable metastatic disease  -Repeat TTE is pending       Dysphagia: GI evaluation pending. 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     Anemia/Thrombocytopenia: likely related to chemotherapy.      Recurrent C diff: follows with GI     Cancer related pain:  palliative care following           COURTNEY Foster MD  [facility]                 Routing History      MEDICATIONS ADMINISTERED IN LAST 1 DAY:  ceFAZolin (Ancef) 2 g in 20mL IV syringe premix       Date Action Dose Route User    3/14/2024 1840 Given 2 g Intravenous Kimmy Butcher, DANY          HYDROcodone-acetaminophen (Norco) 5-325 MG per tab 2 tablet       Date Action Dose Route User    3/15/2024 0140 Given 2 tablet Oral Jules Wise RN          HYDROmorphone (Dilaudid) 1 MG/ML injection 0.5 mg       Date Action Dose Route User    3/14/2024 2120 Given 0.5 mg Intravenous Jules Wise RN    3/14/2024 2029 Given 0.5 mg  Intravenous Donnie Avendaño RN    3/14/2024 1924 Given 0.5 mg Intravenous Donnie Avendaño RN    3/14/2024 1824 Given 0.5 mg Intravenous Kimmy Butcher RN          iopamidol 76% (ISOVUE-370) injection for power injector       Date Action Dose Route User    3/14/2024 2228 Given 80 mL Intravenous Inga Carl          ipratropium-albuterol (Duoneb) 0.5-2.5 (3) MG/3ML inhalation solution 3 mL       Date Action Dose Route User    3/14/2024 1849 Given 3 mL Nebulization ClaudinerTim RCP          lactated ringers infusion       Date Action Dose Route User    3/15/2024 0632 New Bag (none) Intravenous Jules Wise RN    3/14/2024 2233 New Bag (none) Intravenous Jules Wise RN          metoclopramide (Reglan) 5 mg/mL injection 10 mg       Date Action Dose Route User    3/14/2024 1822 Given 10 mg Intravenous Kimmy Butcher RN          metRONIDAZOLE in sodium chloride 0.79% (Flagyl) 5 mg/mL IVPB premix 500 mg       Date Action Dose Route User    3/14/2024 1904 New Bag 500 mg Intravenous Kimmy Butcher RN          morphINE PF 2 MG/ML injection 1 mg       Date Action Dose Route User    3/14/2024 2322 Given 1 mg Intravenous Jules Wise RN          morphINE PF 2 MG/ML injection 2 mg       Date Action Dose Route User    3/15/2024 0802 Given 2 mg Intravenous Valeria Price RN          ondansetron (Zofran) 4 MG/2ML injection 4 mg       Date Action Dose Route User    3/14/2024 1822 Given 4 mg Intravenous Kimmy Butcher RN          pantoprazole (Protonix) 40 mg in sodium chloride 0.9% PF 10 mL IV push       Date Action Dose Route User    3/14/2024 1825 Given 40 mg Intravenous Kimmy Butcher RN          piperacillin-tazobactam (Zosyn) 3.375 g in dextrose 5% 100 mL IVPB-ADDV       Date Action Dose Route User    3/15/2024 0632 New Bag 3.375 g Intravenous Jules Wise RN    3/14/2024 2233 New Bag 3.375 g Intravenous Jules Wise RN           sodium chloride 0.9 % IV bolus 1,000 mL       Date Action Dose Route User    3/14/2024 1803 New Bag 1,000 mL Intravenous Kimmy Butcher, RN            Vitals (last day)       Date/Time Temp Pulse Resp BP SpO2 Weight O2 Device O2 Flow Rate (L/min) [facility]    03/15/24 0514 97.9 °F (36.6 °C) 54 18 157/70 91 % -- -- -- AR    03/14/24 2155 -- -- -- -- 96 % 170 lb None (Room air) -- CP    03/14/24 2149 -- -- -- -- -- 170 lb -- -- AR    03/14/24 2100 98.6 °F (37 °C) 50 16 134/76 -- -- -- -- CP    03/14/24 2031 -- 57 14 147/70 97 % -- None (Room air) -- BG    03/14/24 1700 -- -- -- -- -- -- None (Room air) -- KM    03/14/24 1605 97.6 °F (36.4 °C) 54 16 122/74 95 % 182 lb None (Room air) -- ET

## 2024-03-15 NOTE — PROGRESS NOTES
[facility] Progress Note   Length of Stay: 1    Subjective:   -States that he is not nauseated but is having issues with swallowing liquids and solids again. Whenever he swallows he has to spit it back up  -Mildly nauseated     Oncology History   -2018: Per report had a normal EGD and colonoscopy     -June 2022: He met with GI due to new onset dysphagia which started about 1 month prior to his visit.  He had an esophagram with a focal abrupt narrowing with irregular margins in the distal one third of the esophagus extending to the GE junction.  He also had an episode of food impaction. He has also lost about 15 lb. He was a heavy smoker from age 14 to about 40 (1.5 PPD). Also with alcohol use currently. Mother with a history of breast and colon cancer.      -EGD and EUS with Dr. Yadav on 6/7/2022: Severe esophagitis with a concerning mass extending 37-39 centimeters from the incisors. Nonobstructive mass.  By EUS uT3N1 disease.  Pathology showed invasive moderate to poorly differentiated adenocarcinoma.  HER2 amplified by FISH     -PET/CT on 6/20/2022: Increased distal esophageal uptake with an SUV of 14.4.  Concern for shreya metastases.     -Concurrent chemoradiation with carboplatin AUC2 plus paclitaxel 50 mg/m2: 7/6/22-8/10/22:  went from 137 to 30.9.      -PET/CT on 9/6/22: showed overall improvement     -Surgery with Dr. Lu on 9/30/22: ypT1b pN0. Recovery has been complicated by an esophageal leak     -I met with him on 12/12/22. We discussed adjuvant opdivo for 1 year. His  was elevated to 48 and repeat was 64. CT CAP was recommended.      -He was admitted on 12/25/22 for abdominal pain. He had a POEM procedure done. He was also noted to have a RLL consolidation. He was seen by pulmonary, based on imaging an outpatient PET/CT was recommended and a biopsy of the most FDG avid lesion would be considered. Noted to have CAD and an outpatient evaluation was recommended.       -PET/CT on 1/4/23-imaging reviewed in tumor board and no focal areas of concern and recommendation was to start immunotherapy.      -Adjuvant Opdivo: 1/19/2023-3/6/23. During this time, his tumor marker was rising but imaging was negative. Imaging from 4/10/23 was concerning a pancreatic head mass. HE underwent and EGD on 4/7/232 with dr. Hinson. 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 Her 2 IHC was 2+ on this specimen but FISH was negative. Due to these findings, treatment for metastatic esophageal cancer was considered and he was started on FOLFOX + Herceptin + IO.      FOLFOX + Herceptin + Immunotherapy  -C1: 5/1/23:   -C2: 5/15/23:  1454:   -C3: 6/5/23: Switched to Xeloda, Oxaliplatin, Herceptin, Keytruda due to national 5-FU shortage:  329  -C4: 7/10/23: Delayed to the thrombocytopenia:  79 to 35 Will switch back to 5-FU  -C5: 7/24/23:  21.5  -PET/CT from 7/27/23 showed resolution of disease   -C6: 8/7/23:  15.1  -C7: 8/28/23: Ca19-9 Held Oxaliplatin d/t neuropathy.  13.9  -C8: 9/11/23:  12.3 Oxaliplatin held. Signatera negative     -Herceptin and Keytruda maintenance q3 weeks since Signatera was negative   -C1: 9/25/23:  10.3  -C2: 10/9/23: Ca19-9 15. 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 Also noted to have diffuse GGO and MS/Hilar LAD-unclear if this related to previous infections. Given findings, restarting FOLFOX + herceptin + IO recommended.      -admitted  from 1/8/24-1/13/24 for persistent dysphagia and bronchesophageal stent. Plan is for an ASD occluder and removal of the bronchial stent as an outpatient.      -1/17/24: EGD with fistula closure with ASD occluder and removal of the bronchial stent.     -1/22/24: Hercepin + IO treatment only since he was having sternotomy wires removed later in the week      -FOLFOX + Herceptin + Opdivo restarted                -C1: 2/5/24:  846               -C2: 2/22/24:  was not drawn     -Admitted for aspiration PNA on 3/6/24-CT Chest/Abdomen showed extensive patchy consolidative and tree in bud appearance. There are some solid nodular components.. No new sites of disease in abdomen. Stable hepatic lesion. Of note, a right mainstain lesion was biopsied by pulmonary on 2/20/24 and was negative for cancer.      -C3: 3/12/24:  424    -Admitted on 3/15/24 with dysphagia. This started 1 day  after chemotherapy. CT AP showed stable metastatic disease. Non specific left side small bowl mesentery nodularity could mesenteric panniculitis vs. Disease.     ROS: 12 Point ROS completed and pertinent positives are above     Objective:    sodium chloride  1,000 mL Intravenous Once    vancomycin  125 mg Oral Q6H    piperacillin-tazobactam  3.375 g Intravenous Q8H    gabapentin  300 mg Oral BID    metoprolol succinate ER  50 mg Oral Daily    morphINE ER  15 mg Oral Q12H    pantoprazole  40 mg Oral BID AC    lipase-protease-amylase (Lip-Prot-Amyl)  25,000 Units Oral TID CC    sertraline  200 mg Oral Daily    enoxaparin  40 mg Subcutaneous Daily     cyclobenzaprine, melatonin, polyethylene glycol (PEG 3350), sennosides, bisacodyl, ondansetron, prochlorperazine, benzonatate, guaiFENesin, glycerin-hypromellose-, sodium chloride, acetaminophen **OR** HYDROcodone-acetaminophen **OR** HYDROcodone-acetaminophen    Physical Exam  Vitals:    03/14/24 2100   BP: 134/76   Pulse: 50   Resp: 16   Temp: 98.6 °F (37 °C)     General: NAD,  AOX3  HEENT: clear op, mmm, no jvd, no scleral icterus   CV: RRR S1S2 no murmurs, no edema  Lungs: CTAB, no increased work of breathing  Abd: soft nt nd +BS no hepatosplenomegaly  Neuro: CN: II-XII grossly intact    Labs/Imaging/Path:  Lab Results   Component Value Date    WBC 3.3 (L) 03/14/2024    HGB 11.1 (L) 03/14/2024    HCT 34.8 (L) 03/14/2024    MCV 89.7 03/14/2024    .0 03/14/2024       Recent Labs   Lab 03/09/24  0604 03/12/24  0925 03/14/24  1801   GLU 98 119* 107*   BUN 9 16 13   CREATSERUM 0.31* 0.72 0.52*   CA 8.3* 8.7 9.1   ALB 2.8* 3.1* 3.1*    139 134*   K 3.9 4.3 4.0    107 105   CO2 24.0 29.0 27.0   ALKPHO 101 103 92   AST 16 22 29   ALT 21 26 31   BILT 0.4 0.2 0.4   TP 6.8 7.0 7.2       Imaging: Reviewed     Assessment and Plan:    Metastatic Esophageal cancer, HER2+: He is s/p chemoRT, esophagectomy (9/30/22) c/b esophageal leak. Due to progression, he was started on FOLFOX + Herceptin + Immunotherapy on 5/1/23. He had a great response to combined treatment but unfortunately progressed while on herceptin/immunotherapy alone. This might have also been due to frequent hospitalizations related to his bronchoesophageal fistula. He is now on FOLFOX + Opdivo + Herceptin again. He has received 3 cycles and the last was on 3/12/24. We are planning on a repeat PET after C4. His CT CAP here shows stable metastatic disease  -Repeat TTE is pending      Dysphagia: GI evaluation pending. 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     Anemia/Thrombocytopenia: likely related to chemotherapy.      Recurrent C diff: follows with GI     Cancer related pain:  palliative care following      Oncology will follow peripherally. Has follow up on 3/25/24. Due for repeat PET afterwards     COURTNEY Christie  [facility]

## 2024-03-15 NOTE — CONSULTS
[facility]   part of [facility]  Palliative Care Initial Consult    Dontae Cortez Jr. Patient Status:  Inpatient    10/15/1970 MRN [id number]   Location [facility] 4NW-A Attending Jan Brito MD   Hosp Day # 1 PCP Adrian Horowitz MD   [phone]     Date of Consult: 03/15/24       History of Present Illness:        Dontae Cortez Jr. is a 53 year old male with PMH of metastatic esophageal carcinoma s/p resection and gastroesophagostomy, metastatic pancreatic carcinoma, CAD s/p CABG, HTN, HLD in addition to the other conditions noted below, presented to ED on 3/14/2024 with CC of intractable n/v and inability to tolerate food or (pain) medications. He is on long-acting MS for cancer-related pain - he follows w OP PC at [facility]. Pt was admitted for further evaluation and management of intractable n/v, diarrheal illness w h/o C diff, metastatic esophageal carcinoma, metastatic pancreatic carcinoma, h/o recent aspiration PNA.  Hospital course includes oncology following - input noted. GI on consult w plan for EGD later today (per RN).    Our palliative service was asked by Dr. Glen Myles to evaluate for palliative care needs and support with Uncontrolled symptoms (nausea); Goals of care discussion.      Today is day #1 of hospital stay. This is the 11th hospitalization in the past 6 months.    Medical History: obtained from [facility]  Past Medical History:   Diagnosis Date    Back problem     Belching 2022    Black stools 2022    Borderline diabetes     Dx in 2013 - HgA1C 6.2%    C. difficile diarrhea 10/23/2022    pt treated and without symptoms    Chest pain 2022    Coronary artery disease     On 13: CABG x 4 with LIMA to LAD and SVG to diagonal, OM and PDA    Decorative tattoo 2000    Depression     Difficult intubation     h/o esophagectomy for CA; developed esophagobronchial vistula    Disorder of liver     LIVER CA    Esophageal cancer (HCC)      completed chemo    Esophageal reflux     Essential hypertension     Exposure to medical diagnostic radiation     last tx 8/18/2022    Frequent urination 01/01/2013    Gastroparesis     Heartburn 02/01/2022    High blood pressure     High cholesterol 08/01/2013    Found when I had quadruple bypass    History of COVID-19 10/16/2022    asymptomatic - pt was dx during a hospitalization for another diagnosis. No continued symptoms    History of stomach ulcers     Hyperlipidemia     Hyperlipidemia LDL goal < 70     Indigestion 02/01/2022    Morbid obesity with BMI of 40.0-44.9, adult (HCC)     Muscle weakness     Nausea vomiting and diarrhea 3/14/2024    Nontoxic multinodular goiter     Dx in 8/2013: pt was told that imaging showed thyroid cysts per PCP    Pancreatic cancer (HCC)     last dose 12/4/2023 is scheduled for another round 12/27/23    Peripheral vascular disease (HCC)     pt denies    Personal history of antineoplastic chemotherapy     for esophageal cancer/completed    Personal history of antineoplastic chemotherapy 12/2023    pancreatic cancer    Problems with swallowing 02/01/2022    Pulmonary nodules     Dx in 8/2013: CT chest showed small bilateral fissural-based lung nodules less than 1 cm    S/P CABG x 4     On 8/16/13: CABG x 4 with LIMA to LAD and SVG to diagonal, OM and PDA    Shortness of breath     when coughing; no oxygen    Vomiting 02/01/2022     Past Surgical History:   Procedure Laterality Date    APPENDECTOMY      APPENDECTOMY      ARTHROSCOPY OF JOINT UNLISTED      right shoulder    CABG      On 8/16/13: CABG x 4 with LIMA to LAD and SVG to diagonal, OM and PDA    CARDIAC CATH LAB      On 8/14/2013: cardiac cath showed 3-vessel disease    OTHER SURGICAL HISTORY      1.       Laparoscopic robotic-assisted esophagogastrectomy.    PORT, INDWELLING, IMP         Allergies:  No Known Allergies    Medications:     Current Facility-Administered Medications:     morphINE PF 2 MG/ML injection 1 mg, 1  mg, Intravenous, Q2H PRN **OR** morphINE PF 2 MG/ML injection 2 mg, 2 mg, Intravenous, Q2H PRN **OR** morphINE PF 4 MG/ML injection 4 mg, 4 mg, Intravenous, Q2H PRN    sodium chloride 0.9% infusion 1,000 mL, 1,000 mL, Intravenous, Once    vancomycin (Vancocin) cap 125 mg, 125 mg, Oral, Q6H    piperacillin-tazobactam (Zosyn) 3.375 g in dextrose 5% 100 mL IVPB-ADDV, 3.375 g, Intravenous, Q8H    cyclobenzaprine (Flexeril) tab 5 mg, 5 mg, Oral, TID PRN    gabapentin (Neurontin) cap 300 mg, 300 mg, Oral, BID    metoprolol succinate ER (Toprol XL) 24 hr tab 50 mg, 50 mg, Oral, Daily    morphINE ER (MS Contin) tab 15 mg, 15 mg, Oral, Q12H    pantoprazole (Protonix) DR tab 40 mg, 40 mg, Oral, BID AC    pancrelipase (Lip-Prot-Amyl) (Zenpep) DR particles cap 25,000 Units, 25,000 Units, Oral, TID CC    sertraline (Zoloft) tab 200 mg, 200 mg, Oral, Daily    lactated ringers infusion, , Intravenous, Continuous    melatonin tab 3 mg, 3 mg, Oral, Nightly PRN    polyethylene glycol (PEG 3350) (Miralax) 17 g oral packet 17 g, 17 g, Oral, Daily PRN    sennosides (Senokot) tab 17.2 mg, 17.2 mg, Oral, Nightly PRN    bisacodyl (Dulcolax) 10 MG rectal suppository 10 mg, 10 mg, Rectal, Daily PRN    ondansetron (Zofran) 4 MG/2ML injection 4 mg, 4 mg, Intravenous, Q6H PRN    prochlorperazine (Compazine) 10 MG/2ML injection 5 mg, 5 mg, Intravenous, Q8H PRN    benzonatate (Tessalon) cap 200 mg, 200 mg, Oral, TID PRN    guaiFENesin (Robitussin) 100 MG/5 ML oral liquid 200 mg, 200 mg, Oral, Q4H PRN    glycerin-hypromellose- (Artifical Tears) 0.2-0.2-1 % ophthalmic solution 1 drop, 1 drop, Both Eyes, QID PRN    sodium chloride (Saline Mist) 0.65 % nasal solution 1 spray, 1 spray, Each Nare, Q3H PRN    enoxaparin (Lovenox) 40 MG/0.4ML SUBQ injection 40 mg, 40 mg, Subcutaneous, Daily    acetaminophen (Tylenol) tab 650 mg, 650 mg, Oral, Q4H PRN **OR** HYDROcodone-acetaminophen (Norco) 5-325 MG per tab 1 tablet, 1 tablet, Oral, Q4H PRN **OR**  HYDROcodone-acetaminophen (Norco) 5-325 MG per tab 2 tablet, 2 tablet, Oral, Q4H PRN  Prior to Admission Medications   Medication Sig    OLANZapine 5 MG Oral Tab Take 1 tablet (5 mg total) by mouth nightly.       Functional Status History:  ADLs: Dependent  (Bathing or showering, dressing, getting in and out of bed or chair, walking, using the toilet and eating)  IADLs: Dependent  (Use the phone, shop for groceries, meal preparation, manage medicines, clean living area, use     Palliative Care Social History:   Marital Status:   Children: deferred  Living Situation Prior to Admit: Home  Does Patient Live Alone: No  Is Patient Confused: No  Occupational History: Working  drives large truck for [employer]    Social History     Socioeconomic History    Marital status:     Number of children: 3   Occupational History    Occupation: works as  - on workman's comp   Tobacco Use    Smoking status: Former     Packs/day: 1.00     Years: 27.00     Additional pack years: 0.00     Total pack years: 27.00     Types: Cigarettes     Quit date: 8/15/2013     Years since quitting: 10.5    Smokeless tobacco: Never    Tobacco comments:     Quit smoking 2013   Vaping Use    Vaping Use: Never used   Substance and Sexual Activity    Alcohol use: Not Currently    Drug use: Never    Sexual activity: Not Currently     Partners: Female       Substance History:   reports that he quit smoking about 10 years ago. His smoking use included cigarettes. He has a 27 pack-year smoking history. He has never used smokeless tobacco.  reports that he does not currently use alcohol.  reports no history of drug use.      Spiritual Assessment:   Quaker - Parish Not Listed;  deferred    HPI obtained from Epic and Ronen and his wife.    SUBJECTIVE  Review of Systems - Palliative Care Symptom Assessment     I visited Ronen has he returned from the bathroom.   He rates pain at rest 8/10, aching/deep in his abdomen. At its worst, it is  10/10. Goal is \"less than 8\" for him to be able to function. It is worse on his R side when he is positioned or with pressure on his R side. It is localized more to RUQ when this happens, otherwise, generalized abdominal pain at all times.  It wakes him up, reduces his appetite, causes him to have n/v. This has happened before, and he feels that GI procedure later today will solve the issue.    He reports rare use of MS Contin at home during the day as he favors completing ADLS and errands, walking, etc outside of the home. He prefers to take the MS at night/evening before he turns in for the day. Sometimes, he takes it around 2pm if pain is severe.     He reports rare use of Norco.      He wants to have procedure this afternoon and anticipates returning home on his home doses of pain medications w full resolution of acute symptoms, as this has been achievable in the past.  He confirms plan to f/u with OP PC ARTEMIO Bravo for symptom management, and w OP Oncology for further tx.    Dysphagia: +. Declined SLP evaluation for assessment. Feels n/v is d/t something other than dysphagia  Nausea:  open to having IV anti emetics during this pre/procedural time of hospital stay    Last BM:  3/14, every other day, takes miralax, but has trouble w volume at times, adele given n/    Review of pertinent medication requirements in past 24 hours:      IV morphine 4mg + IV hydromorphone 2mg + norco 10mg = 62 OME        MS contin 15mg x0  Gabapentin 300mg BID x0    IV Zofran x1       OBJECTIVE       Hematology:   Lab Results   Component Value Date    WBC 4.0 03/15/2024    HGB 10.6 (L) 03/15/2024    HCT 32.5 (L) 03/15/2024    .0 (L) 03/15/2024       Chemistry:  Lab Results   Component Value Date    CREATSERUM 0.48 (L) 03/15/2024    BUN 12 03/15/2024     (L) 03/15/2024    K 3.8 03/15/2024     03/15/2024    CO2 27.0 03/15/2024     (H) 03/15/2024    CA 9.2 03/15/2024    ALB 2.8 (L) 03/15/2024    MARGI 82  03/15/2024    BILT 0.4 03/15/2024    TP 6.5 03/15/2024    AST 27 03/15/2024    ALT 24 03/15/2024    DDIMER 0.38 12/19/2023    BNP 33 08/15/2013    MG 1.9 03/15/2024    PHOS 2.9 03/15/2024    TROP <0.046 07/18/2017       Imaging:  CT ABDOMEN+PELVIS(CONTRAST ONLY)(CPT=74177)    Result Date: 3/14/2024  CONCLUSION:   1. Relatively stable reticular nodular opacities, clustered nodularity, and interstitial thickening in the partially imaged lower lungs may represent changes related to metastatic disease, multifocal pneumonia, with other etiologies not excluded.  Clinical correlation and continued follow-up is suggested.  2. 1.8 x 1.5 cm mass in segment 8, previously 1.6 x 1.5 cm.  1.2 x 1.1 cm mass in segment 8, previously 1.0 x 1.0 cm.  1.2 x 1.1 cm mass in segment 8/5 is unchanged.  These most likely represent metastatic disease.  Clinical correlation and continued follow-up is suggested.  3. Postoperative changes from previous gastric pull-through.  Stable probable changes of fat necrosis in the right side of the omentum.  4. New nonspecific nodularity within the left side of the small bowel mesentery on image 58 series 2 measuring up to 3.5 x 0.7 cm may represent an area of mesenteric panniculitis, with developing metastatic disease or other etiologies not entirely excluded.  Clinical correlation recommended.  PET-CT may be of further value.  5. Small amount of nonspecific free fluid in the pelvis.  6. Splenomegaly.   Please see above for further details.  LOCATION:  [facility]   Dictated by (CST): Immanuel Matthews MD on 3/14/2024 at 11:09 PM     Finalized by (CST): Immanuel Matthews MD on 3/14/2024 at 11:16 PM       XR CHEST AP PORTABLE  (CPT=71045)    Result Date: 3/14/2024  CONCLUSION:  Small right pleural effusion versus pleural scarring.  Mild scarring/atelectasis in the lower lungs, with underlying infiltrates not excluded.  Clinical correlation recommended.    LOCATION:  [facility]      Dictated by (CST): Immanuel Matthews  MD on 3/14/2024 at 6:35 PM     Finalized by (CST): Immanuel Matthews MD on 3/14/2024 at 6:36 PM        Vital Signs:  Blood pressure 157/70, pulse 54, temperature 97.8 °F (36.6 °C), resp. rate 18, height 6' 2\" (1.88 m), weight 170 lb (77.1 kg), SpO2 91%.        Physical Exam:     GENERAL: Alert, ambulatory/independent. NAD.  HEENT: No focal deficits.   RESPIRATORY: no increased effort at rest on room air.  ABDOMEN:  generalized TTP though RUQ > than other areas  EXTREMITIES: non edematous  NEUROLOGIC: A&O x4. BROOKS.  PSYCHIATRIC:  pleasant, no confusion, no agitation/restlessness.  SKIN: Warm and dry.     Pre-hospital Palliative Performance Scale: (pt/family reported/per chart review): 90 %  Current Palliative Performance Scale:  80-90%    Palliative Performance Scale   % Ambulation Activity Level Self-Care Intake Consciousness   100 Full Normal Full   Normal Full   90 Full No disease  Normal Full Normal Full   80 Full Some disease  Normal w/effort Full Normal or  Reduced Full   70 Reduced Some disease  Can't perform job Full Normal or   Reduced Full   60 Reduced Significant disease  Can't perform hobby Occasional  Assist Normal or   Reduced Full or confused   50 Mainly sit/lie Extensive Disease  Can't do any work Partial Assist Normal or Reduced Full or confused   40 Mainly in bed Extensive Disease  Can't do any work Mainly Assist Normal or Reduced Full or confused   30 Bedbound Extensive Disease  Can't do any work Max Assist  Total Care Reduced Drowsy/confused   20 Bedbound Extensive Disease  Can't do any work Max Assist  Total Care Minimal Drowsy/confused   10 Bedbound/coma Extensive Disease  Can't do any work  coma  Max Assist  Total Care Mouth care Drowsy or coma   0 Death     Palliative Care Assessment       Goals of Care:      I met with Ronen and his wife Alysha at his bedside. They are familiar w palliative care as he follows w OP PC [facility]EM[facility] Bravo. Focus of today's visit primarily was symptoms. He hopes to have  resolution of sx after GI evaluation/procedure later today, and be able to get back on his PO pain medications, and then to return home as this has been the case historically. He expresses frustration w frequency of hospital stays for similar problems.    Discussed plan to continue w IV morphine PRN for now, and if / when able, can add back his MS Contin. Encourage IV anti emetics and bowel regimen to keep things moving.        Advance Care Planning:    Code Status:  Full Code.  Per current epic order, discussion deferred today.    HCPOA:  document on file.  Healthcare Agent Appointed: Yes  Healthcare Agent's Name: Alysha Mcduffie  Healthcare Agent's Phone Number: [phone]        Problem List:       Principal Problem:    Nausea vomiting and diarrhea  Active Problems:    Coronary artery disease involving coronary bypass graft of native heart with angina pectoris (HCC)    Esophageal carcinoma (HCC)    Pancreatic carcinoma (HCC)    Broncho-esophageal fistula (HCC)    Iron deficiency anemia, unspecified iron deficiency anemia type    Azotemia    Palliative care encounter    Goals of care, counseling/discussion    Cancer related pain      Palliative Care Recommendations / Plan:       Symptoms: .  Pain, gen abdominal, cancer-related, worse to RUQ - uncontrolled and unable to take PO d/t GI / esophageal symptoms, await scope later today  Continue with IV Morphine panel PRN for now while NPO/unable to tolerate PO  Resume MS Contin 15mg q12hr per home regimen when able to tolerate PO  Norco 5-10mg q4hr PRN when able to take PO  If unable to tolerate PO, and prolonged hospital stay is anticipated, then may need to consider PCA  Gabapentin 300mg BID when able to take PO    Nausea/v:  Ondansetron, Prochlorperazine IV available PRN please offer as available    OIC prevention:  Last BM 3/14 per pt. Goal to achieve BM q48 hours  Resume bowel regimen when able to take PO.   If swallowing continues to be difficulty, recommend  against miralax and consider tabs.  Bowel regimen available PRN, consider suppository of NPO and no BM after 3/16      Goals of Care: Treatment-focused.  Ronen anticipates resolution of acute problems following scope today, as this has been the case in the past. He hopes to return to his OP f/u with oncology and pursue further tx.  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 discussion deferred.    HCPOA: wife, Alysha     Psychosocial:  Emotional support provided.    Disposition:  pending clinical course.      A total of 75 minutes were spent on this consult, which included all of the following:  Chart review, direct face to face contact, history taking, physical examination, counseling and coordinating care, and documentation.     I reviewed the above with patient's RN, primary, onc, GI via epic message.    Thank you for inviting Palliative Care Service to participate in the care of Dontae Cortez and family.       Will follow.      ARTEMIO Baum  Palliative Care    3/15/2024  1:00 PM      The 21st Century Cures Act makes medical notes like these available to patients in the interest of transparency. Please be advised this is a medical document. Medical documents are intended to carry relevant information, facts as evident, and the clinical opinion of the practitioner. The medical note is intended as a peer to peer communication, and may appear blunt or direct. It is written in medical language and may contain abbreviations or verbiage that are unfamiliar.

## 2024-03-15 NOTE — ANESTHESIA PREPROCEDURE EVALUATION
PRE-OP EVALUATION    Patient Name: Dontae Cortez Jr.    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  Temp: 97.8 °F (36.6 °C)  Pulse: 50  Resp: 20  BP: 168/80  SpO2: 93 %  Body mass index is 21.83 kg/m².    Current medications reviewed.  Hospital Medications:   [COMPLETED] morphINE PF 2 MG/ML injection 2 mg  2 mg Intravenous Once    morphINE PF 2 MG/ML injection 1 mg  1 mg Intravenous Q2H PRN    Or    morphINE PF 2 MG/ML injection 2 mg  2 mg Intravenous Q2H PRN    Or    morphINE PF 4 MG/ML injection 4 mg  4 mg Intravenous Q2H PRN    [COMPLETED] sodium chloride 0.9 % IV bolus 1,000 mL  1,000 mL Intravenous Once    sodium chloride 0.9% infusion 1,000 mL  1,000 mL Intravenous Once    [COMPLETED] metoclopramide (Reglan) 5 mg/mL injection 10 mg  10 mg Intravenous Once    [COMPLETED] pantoprazole (Protonix) 40 mg in sodium chloride 0.9% PF 10 mL IV push  40 mg Intravenous Once    [COMPLETED] HYDROmorphone (Dilaudid) 1 MG/ML injection 0.5 mg  0.5 mg Intravenous Q30 Min PRN    [COMPLETED] ondansetron (Zofran) 4 MG/2ML injection 4 mg  4 mg Intravenous Once    [] sodium chloride 0.9% infusion   Intravenous Continuous    [] HYDROmorphone (Dilaudid) 1 MG/ML injection 0.5 mg  0.5 mg Intravenous Q30 Min PRN    [COMPLETED] ipratropium-albuterol (Duoneb) 0.5-2.5 (3) MG/3ML inhalation solution 3 mL  3 mL Nebulization Once    [COMPLETED] ceFAZolin (Ancef) 2 g in 20mL IV syringe premix  2 g Intravenous Once    [COMPLETED] metRONIDAZOLE in  sodium chloride 0.79% (Flagyl) 5 mg/mL IVPB premix 500 mg  500 mg Intravenous Once    vancomycin (Vancocin) cap 125 mg  125 mg Oral Q6H    piperacillin-tazobactam (Zosyn) 3.375 g in dextrose 5% 100 mL IVPB-ADDV  3.375 g Intravenous Q8H    cyclobenzaprine (Flexeril) tab 5 mg  5 mg Oral TID PRN    gabapentin (Neurontin) cap 300 mg  300 mg Oral BID    metoprolol succinate ER (Toprol XL) 24 hr tab 50 mg  50 mg Oral Daily    morphINE ER (MS Contin) tab 15 mg  15 mg Oral Q12H    pantoprazole (Protonix) DR tab 40 mg  40 mg Oral BID AC    pancrelipase (Lip-Prot-Amyl) (Zenpep) DR particles cap 25,000 Units  25,000 Units Oral TID CC    sertraline (Zoloft) tab 200 mg  200 mg Oral Daily    lactated ringers infusion   Intravenous Continuous    melatonin tab 3 mg  3 mg Oral Nightly PRN    polyethylene glycol (PEG 3350) (Miralax) 17 g oral packet 17 g  17 g Oral Daily PRN    sennosides (Senokot) tab 17.2 mg  17.2 mg Oral Nightly PRN    bisacodyl (Dulcolax) 10 MG rectal suppository 10 mg  10 mg Rectal Daily PRN    ondansetron (Zofran) 4 MG/2ML injection 4 mg  4 mg Intravenous Q6H PRN    prochlorperazine (Compazine) 10 MG/2ML injection 5 mg  5 mg Intravenous Q8H PRN    benzonatate (Tessalon) cap 200 mg  200 mg Oral TID PRN    guaiFENesin (Robitussin) 100 MG/5 ML oral liquid 200 mg  200 mg Oral Q4H PRN    glycerin-hypromellose- (Artifical Tears) 0.2-0.2-1 % ophthalmic solution 1 drop  1 drop Both Eyes QID PRN    sodium chloride (Saline Mist) 0.65 % nasal solution 1 spray  1 spray Each Nare Q3H PRN    enoxaparin (Lovenox) 40 MG/0.4ML SUBQ injection 40 mg  40 mg Subcutaneous Daily    acetaminophen (Tylenol) tab 650 mg  650 mg Oral Q4H PRN    Or    HYDROcodone-acetaminophen (Norco) 5-325 MG per tab 1 tablet  1 tablet Oral Q4H PRN    Or    HYDROcodone-acetaminophen (Norco) 5-325 MG per tab 2 tablet  2 tablet Oral Q4H PRN    [COMPLETED] iopamidol 76% (ISOVUE-370) injection for power injector  80 mL Intravenous ONCE PRN     [COMPLETED] morphINE PF 2 MG/ML injection 1 mg  1 mg Intravenous Once       Outpatient Medications:     Medications Prior to Admission   Medication Sig Dispense Refill Last Dose    morphINE ER 15 MG Oral Tab CR Take 1 tablet (15 mg total) by mouth every 12 (twelve) hours. 60 tablet 0 Past Week at 0900    ondansetron (ZOFRAN) 8 MG tablet Take 1 tablet (8 mg total) by mouth every 8 (eight) hours as needed for Nausea. 30 tablet 3 3/13/2024    HYDROcodone-acetaminophen 5-325 MG Oral Tab Take 1 tablet by mouth every 4 (four) hours as needed for Pain. 60 tablet 0 3/13/2024    Pancrelipase, Lip-Prot-Amyl, (CREON) 96542-09708 units Oral Cap DR Particles Take 2 capsules with meals and 1 capsule with snacks 240 capsule 0 Past Week at 0800    gabapentin 300 MG Oral Cap Take 1 capsule (300 mg total) by mouth in the morning and 1 capsule (300 mg total) before bedtime. 180 capsule 0 Past Week at 0900    sertraline 100 MG Oral Tab Take 2 tablets (200 mg total) by mouth daily.   Past Week at 0900    losartan 50 MG Oral Tab Take 1 tablet (50 mg total) by mouth daily. 90 tablet 2 Past Week at 0900    Omeprazole 40 MG Oral Capsule Delayed Release Take 1 capsule (40 mg total) by mouth 2 (two) times daily before meals. 90 capsule 3 Past Week at 0900    metoprolol succinate ER 50 MG Oral Tablet 24 Hr TAKE 1 TABLET BY MOUTH EVERY DAY 90 tablet 1 Past Week at 0800    cyclobenzaprine 5 MG Oral Tab Take 1 tablet (5 mg total) by mouth 3 (three) times daily as needed for Muscle spasms. 30 tablet 0 Unknown    metoclopramide (REGLAN) 10 MG Oral Tab Take 1 tablet (10 mg total) by mouth 4 (four) times daily before meals and nightly. 120 tablet 2 Unknown    guaiFENesin-codeine 100-10 MG/5ML Oral Solution Take 5 mL by mouth every 6 (six) hours as needed for cough. 237 mL 1 Unknown    benzonatate 100 MG Oral Cap Take 1 capsule (100 mg total) by mouth 3 (three) times daily as needed for cough. 90 capsule 0 Unknown       Allergies: Patient has no known  allergies.      Anesthesia Evaluation        Anesthetic Complications  (-) history of anesthetic complications         GI/Hepatic/Renal      (+) GERD          (+) liver disease                 Cardiovascular        Exercise tolerance: poor     MET: <=4      (+) hypertension     (+) CAD                                Endo/Other                                  Pulmonary               (+) shortness of breath            Neuro/Psych      (+) depression                        54yo male with metastatic cancer presents for egd and stent.     PMHx metastatic esophageal carcinoma s/p resection and gastroesophagostomy, metastatic pancreatitic carcinoma, tracheoesophageal fistula s/p stent, CAD s/p CABG at age 43, HTN, HLD, Hx prior C diff who presents with intractable nausea/vomiting           Past Surgical History:   Procedure Laterality Date    APPENDECTOMY      APPENDECTOMY      ARTHROSCOPY OF JOINT UNLISTED      right shoulder    CABG      On 8/16/13: CABG x 4 with LIMA to LAD and SVG to diagonal, OM and PDA    CARDIAC CATH LAB      On 8/14/2013: cardiac cath showed 3-vessel disease    OTHER SURGICAL HISTORY      1.       Laparoscopic robotic-assisted esophagogastrectomy.    PORT, INDWELLING, IMP       Social History     Socioeconomic History    Marital status:     Number of children: 3   Occupational History    Occupation: works as  - on workman's comp   Tobacco Use    Smoking status: Former     Packs/day: 1.00     Years: 27.00     Additional pack years: 0.00     Total pack years: 27.00     Types: Cigarettes     Quit date: 8/15/2013     Years since quitting: 10.5    Smokeless tobacco: Never    Tobacco comments:     Quit smoking 2013   Vaping Use    Vaping Use: Never used   Substance and Sexual Activity    Alcohol use: Not Currently    Drug use: Never    Sexual activity: Not Currently     Partners: Female   Other Topics Concern    Caffeine Concern Yes    Stress Concern No    Weight Concern No     Special Diet No    Exercise No    Seat Belt No     History   Drug Use Unknown     Available pre-op labs reviewed.  Lab Results   Component Value Date    WBC 4.0 03/15/2024    RBC 3.59 (L) 03/15/2024    HGB 10.6 (L) 03/15/2024    HCT 32.5 (L) 03/15/2024    MCV 90.5 03/15/2024    MCH 29.5 03/15/2024    MCHC 32.6 03/15/2024    RDW 15.2 03/15/2024    .0 (L) 03/15/2024     Lab Results   Component Value Date     (L) 03/15/2024    K 3.8 03/15/2024     03/15/2024    CO2 27.0 03/15/2024    BUN 12 03/15/2024    CREATSERUM 0.48 (L) 03/15/2024     (H) 03/15/2024    CA 9.2 03/15/2024     Lab Results   Component Value Date    INR 1.21 (H) 03/15/2024         Airway      Mallampati: II  Mouth opening: >3 FB  TM distance: 4 - 6 cm  Neck ROM: full Cardiovascular    Cardiovascular exam normal.         Dental  Comment: Denies loose, broken teeth           Pulmonary    Pulmonary exam normal.                 Other findings              ASA: 4   Plan: general  NPO status verified and patient meets guidelines.  Patient has not taken beta blockers in last 24 hours.      Comment: PONV, dental/oral injury, corneal abrasion, postoperative pain, allergic reaction, death, aspiration    Plan/risks discussed with: patient and spouse                Present on Admission:   Coronary artery disease involving coronary bypass graft of native heart with angina pectoris (HCC)   Pancreatic carcinoma (HCC)

## 2024-03-15 NOTE — H&P
[facility]     Dontae Cortez JrFritz Patient Status:  Emergency    10/15/1970 MRN [id number]   Location [facility] EMERGENCY DEPARTMENT Attending Lary Bernstein MD   Hosp Day # 0 PCP Adrian Horowitz MD     Chief Complaint: Intractable nausea/vomiting    History of Present Illness: Dontae Cortez Jr. is a 53 year old male with PMHx metastatic esophageal carcinoma s/p resection and gastroesophagostomy, metastatic pancreatitic carcinoma, CAD s/p CABG, HTN, HLD, Hx prior C diff who presents with intractable nausea/vomiting.    He was in normal state of health when he started having sudden onset nausea and vomiting yesterday nonbloody nonbilious. states this was unrelated to food however is not able to tolerate any p.o. intake or pills.  Endorsing diffuse abdominal pain which is also started around the same time.  Notes ongoing bowel movements, still has 3-4 loose watery bowel movements which has not gotten worsened in the past few weeks.  Has a history of prior C. difficile colitis.  Recently admitted for aspiration pneumonia and discharged on oral antibiotic therapy.  Wife notes fevers up to 100.1 at home.  Still having cough and sputum production.    Past Medical History:  Past Medical History:   Diagnosis Date    Back problem     Belching 2022    Black stools 2022    Borderline diabetes     Dx in 2013 - HgA1C 6.2%    C. difficile diarrhea 10/23/2022    pt treated and without symptoms    Chest pain 2022    Coronary artery disease     On 13: CABG x 4 with LIMA to LAD and SVG to diagonal, OM and PDA    Decorative tattoo 2000    Depression     Difficult intubation     h/o esophagectomy for CA; developed esophagobronchial vistula    Disorder of liver     LIVER CA    Esophageal cancer (HCC)     completed chemo    Esophageal reflux     Essential hypertension     Exposure to medical diagnostic radiation     last tx 2022    Frequent  urination 01/01/2013    Gastroparesis     Heartburn 02/01/2022    High blood pressure     High cholesterol 08/01/2013    Found when I had quadruple bypass    History of COVID-19 10/16/2022    asymptomatic - pt was dx during a hospitalization for another diagnosis. No continued symptoms    History of stomach ulcers     Hyperlipidemia     Hyperlipidemia LDL goal < 70     Indigestion 02/01/2022    Morbid obesity with BMI of 40.0-44.9, adult (HCC)     Muscle weakness     Nausea vomiting and diarrhea 3/14/2024    Nontoxic multinodular goiter     Dx in 8/2013: pt was told that imaging showed thyroid cysts per PCP    Pancreatic cancer (HCC)     last dose 12/4/2023 is scheduled for another round 12/27/23    Peripheral vascular disease (HCC)     pt denies    Personal history of antineoplastic chemotherapy     for esophageal cancer/completed    Personal history of antineoplastic chemotherapy 12/2023    pancreatic cancer    Problems with swallowing 02/01/2022    Pulmonary nodules     Dx in 8/2013: CT chest showed small bilateral fissural-based lung nodules less than 1 cm    S/P CABG x 4     On 8/16/13: CABG x 4 with LIMA to LAD and SVG to diagonal, OM and PDA    Shortness of breath     when coughing; no oxygen    Vomiting 02/01/2022        Past Surgical History:   Past Surgical History:   Procedure Laterality Date    APPENDECTOMY      APPENDECTOMY      ARTHROSCOPY OF JOINT UNLISTED      right shoulder    CABG      On 8/16/13: CABG x 4 with LIMA to LAD and SVG to diagonal, OM and PDA    CARDIAC CATH LAB      On 8/14/2013: cardiac cath showed 3-vessel disease    OTHER SURGICAL HISTORY      1.       Laparoscopic robotic-assisted esophagogastrectomy.    PORT, INDWELLING, IMP         Social History:  reports that he quit smoking about 10 years ago. His smoking use included cigarettes. He has a 27 pack-year smoking history. He has never used smokeless tobacco. He reports that he does not currently use alcohol. He reports that he  does not use drugs.    Family History:   Family History   Problem Relation Age of Onset    Cancer Mother         breast and colon     Diabetes Neg        Allergies: No Known Allergies    Medications:    Current Facility-Administered Medications on File Prior to Encounter   Medication Dose Route Frequency Provider Last Rate Last Admin    [COMPLETED] ondansetron (Zofran) 16 mg, dexAMETHasone (Decadron) 20 mg in sodium chloride 0.9% 110 mL IVPB   Intravenous Once Cristina Brown APRN   Stopped at 03/12/24 1143    [COMPLETED] nivolumab (Opdivo) 240 mg in sodium chloride 0.9% 124 mL IVPB  240 mg Intravenous Once Cristina Brown APRN   Stopped at 03/12/24 1123    [COMPLETED] trastuzumab-qyyp (Trazimera) 336 mg in sodium chloride 0.9% 266 mL infusion  4 mg/kg (Treatment Plan Recorded) Intravenous Once Cristina Brown APRN   Stopped at 03/12/24 1219    [COMPLETED] oxaliplatin (Eloxatin) 155 mg in dextrose 5% 281 mL infusion  75 mg/m2 (Treatment Plan Recorded) Intravenous Once Cristina Brown APRN   Stopped at 03/12/24 1427    [COMPLETED] leucovorin 850 mg in dextrose 5% 250 mL infusion  400 mg/m2 (Treatment Plan Recorded) Intravenous Once Cristina Brown APRN   Stopped at 03/12/24 1427    [COMPLETED] iopamidol 76% (ISOVUE-370) injection for power injector  80 mL Intravenous ONCE PRN Raheel Ritchie MD   80 mL at 03/06/24 1650    [COMPLETED] heparin (Porcine) 100 Units/mL lock flush 500 Units  5 mL Intravenous Once Raheel Ritchie MD   500 Units at 03/06/24 1655    [COMPLETED] HYDROmorphone (Dilaudid) 1 MG/ML injection 0.5 mg  0.5 mg Intravenous Q30 Min PRN Cat Burt MD   0.5 mg at 03/06/24 2347    [COMPLETED] ondansetron (Zofran) 4 MG/2ML injection 4 mg  4 mg Intravenous Once Cat Burt MD   4 mg at 03/06/24 2123    [COMPLETED] sodium chloride 0.9 % IV bolus 1,000 mL  1,000 mL Intravenous Once Cat Burt MD   Stopped at 03/06/24 2302    [COMPLETED] piperacillin-tazobactam (Zosyn) 4.5 g in  dextrose 5% 100 mL IVPB-ADDV  4.5 g Intravenous Once Cat Burt MD   Stopped at 03/06/24 2250    [COMPLETED] ondansetron (Zofran) 16 mg, dexAMETHasone (Decadron) 20 mg in sodium chloride 0.9% 110 mL IVPB   Intravenous Once Katie Conner APRN   Stopped at 02/22/24 1204    [COMPLETED] nivolumab (Opdivo) 240 mg in sodium chloride 0.9% 124 mL IVPB  240 mg Intravenous Once Katie Conner APRN   Stopped at 02/22/24 1103    [COMPLETED] trastuzumab-qyyp (Trazimera) 336 mg in sodium chloride 0.9% 266 mL infusion  4 mg/kg (Treatment Plan Recorded) Intravenous Once Katie Conner APRN   Stopped at 02/22/24 1144    [COMPLETED] oxaliplatin (Eloxatin) 155 mg in dextrose 5% 281 mL infusion  75 mg/m2 (Treatment Plan Recorded) Intravenous Once Katie Conner APRN   Stopped at 02/22/24 1415    [COMPLETED] leucovorin 850 mg in dextrose 5% 250 mL infusion  400 mg/m2 (Treatment Plan Recorded) Intravenous Once Katie Conner APRN   Stopped at 02/22/24 1415    [COMPLETED] ondansetron (Zofran) 16 mg, dexAMETHasone (Decadron) 20 mg in sodium chloride 0.9% 110 mL IVPB   Intravenous Once Senia Foster MD   Stopped at 02/05/24 1141    [COMPLETED] trastuzumab-qyyp (Trazimera) 336 mg in sodium chloride 0.9% 266 mL infusion  4 mg/kg (Treatment Plan Recorded) Intravenous Once Senia Foster MD   Stopped at 02/05/24 1217    [COMPLETED] oxaliplatin (Eloxatin) 155 mg in dextrose 5% 281 mL infusion  75 mg/m2 (Treatment Plan Recorded) Intravenous Once Senia Foster MD   Stopped at 02/05/24 1432    [COMPLETED] leucovorin 850 mg in dextrose 5% 250 mL infusion  400 mg/m2 (Treatment Plan Recorded) Intravenous Once Senia Foster MD   Stopped at 02/05/24 1432    [COMPLETED] morphINE PF 4 MG/ML injection 4 mg  4 mg Intravenous Once Jose E Chung MD   4 mg at 01/29/24 0447    [COMPLETED] ondansetron (Zofran) 4 MG/2ML injection 4 mg  4 mg Intravenous Once Jose E Chung MD   4 mg at 01/29/24 0447    [COMPLETED] morphINE  PF 4 MG/ML injection 4 mg  4 mg Intravenous Once Jose E Chung MD   4 mg at 24 0647    [COMPLETED] iopamidol 76% (ISOVUE-370) injection for power injector  75 mL Intravenous ONCE PRN Jose Roberto Myles DO   75 mL at 24 1148    [COMPLETED] ceFAZolin (Ancef) 2 g in 20mL IV syringe premix  2 g Intravenous On Call Vasquez Alexis MD   1 g at 24 1250    [COMPLETED] mupirocin (Bactroban) 2% nasal ointment             [COMPLETED] acetaminophen (Tylenol Extra Strength) tab 1,000 mg  1,000 mg Oral Once PRN Fátima Gallagher MD        Or    [COMPLETED] HYDROcodone-acetaminophen (Norco) 5-325 MG per tab 1 tablet  1 tablet Oral Once PRN Fátima Gallagher MD   1 tablet at 24 1547    Or    [COMPLETED] HYDROcodone-acetaminophen (Norco) 5-325 MG per tab 2 tablet  2 tablet Oral Once PRN Fátima Gallagher MD        [COMPLETED] prochlorperazine (Compazine) 10 MG/2ML injection             [COMPLETED] heparin (Porcine) 100 Units/mL lock flush 500 Units  5 mL Intracatheter Once Vasquez Alexis MD   500 Units at 24 1605    [COMPLETED] pembrolizumab (Keytruda) 400 mg in sodium chloride 0.9% 116 mL IVPB  400 mg Intravenous Once Grecia Angelo APRN   Stopped at 24 1016    [COMPLETED] trastuzumab-qyyp (Trazimera) 336 mg in sodium chloride 0.9% 266 mL infusion  4 mg/kg (Treatment Plan Recorded) Intravenous Once Grecia Angelo APRN   Stopped at 24 1054    [COMPLETED] ondansetron (Zofran) 4 MG/2ML injection             [COMPLETED] HYDROmorphone (Dilaudid) 1 MG/ML injection             [COMPLETED] heparin (Porcine) 100 Units/mL lock flush 500 Units  5 mL Intracatheter Once Charles Maguire MD   500 Units at 24 1104    [COMPLETED] HYDROmorphone (Dilaudid) 1 MG/ML injection             [] naloxone (Narcan) 0.4 MG/ML injection 0.08 mg  0.08 mg Intravenous Once PRN Junaid Evangelista DO        [COMPLETED] potassium chloride (K-Dur) tab 40 mEq  40 mEq Oral Once Jan Brito MD   40 mEq at  24 0833    [COMPLETED] morphINE PF 4 MG/ML injection 4 mg  4 mg Intravenous Once Jose E Chung MD   4 mg at 24    [] sodium chloride 0.9% infusion   Intravenous Continuous Jose E Chung MD   New Bag at 24 1002    [] morphINE PF 4 MG/ML injection 4 mg  4 mg Intravenous Q30 Min PRN Jose E Chung MD        [] ondansetron (Zofran) 4 MG/2ML injection 4 mg  4 mg Intravenous Q4H PRN Jose E Chung MD        [COMPLETED] potassium chloride 40 mEq in 250mL sodium chloride 0.9% IVPB premix  40 mEq Intravenous Once Jose E Chung MD 62.5 mL/hr at 24 40 mEq at 24    [COMPLETED] morphINE PF 4 MG/ML injection 4 mg  4 mg Intravenous Once Jose E Chung MD   4 mg at 24    [COMPLETED] HYDROcodone-acetaminophen (Norco) 5-325 MG per tab 1 tablet  1 tablet Oral Once Jose E Chung MD   1 tablet at 24 2253    [COMPLETED] heparin (Porcine) 100 Units/mL lock flush 500 Units  5 mL Intracatheter Once Senia Foster MD   500 Units at 24 1123    [COMPLETED] potassium chloride 40 mEq in 250mL sodium chloride 0.9% IVPB premix  40 mEq Intravenous Once Megan Arshad MD 62.5 mL/hr at 24 1436 40 mEq at 24 1436    [COMPLETED] iopamidol 76% (ISOVUE-370) injection for power injector  80 mL Intravenous ONCE PRN Sophia Gibson MD   80 mL at 23 0248    [COMPLETED] potassium chloride 40 mEq in 250mL sodium chloride 0.9% IVPB premix  40 mEq Intravenous Once Jose Roberto Myles DO 62.5 mL/hr at 23 1328 40 mEq at 23 1328    [COMPLETED] ipratropium-albuterol (Duoneb) 0.5-2.5 (3) MG/3ML inhalation solution 3 mL  3 mL Nebulization Once Konrad Cuba MD   3 mL at 23 1617    [COMPLETED] ketorolac (Toradol) 15 MG/ML injection 15 mg  15 mg Intravenous Once Konrad Cuba MD   15 mg at 23 1649    [] ondansetron (Zofran) 4 MG/2ML injection 4 mg  4 mg Intravenous Q4H PRN Bereket  MD Konrad        [COMPLETED] piperacillin-tazobactam (Zosyn) 3.375 g in dextrose 5% 100 mL IVPB-ADDV  3.375 g Intravenous Once Konrad Cuba MD   Stopped at 12/29/23 1759    [COMPLETED] ketorolac (Toradol) 15 MG/ML injection 15 mg  15 mg Intravenous Once Konrad Cuba MD   15 mg at 12/29/23 1814    [COMPLETED] morphINE PF 4 MG/ML injection 4 mg  4 mg Intravenous Once Konrad Cuba MD   4 mg at 12/29/23 1952    [COMPLETED] morphINE PF 4 MG/ML injection 4 mg  4 mg Intravenous Once Konrad Cuba MD   4 mg at 12/29/23 2153    [COMPLETED] trastuzumab-qyyp (Trazimera) 504 mg in sodium chloride 0.9% 274 mL infusion  6 mg/kg (Treatment Plan Recorded) Intravenous Once Nataly Siddiqui APRN   Stopped at 12/26/23 0930    [COMPLETED] sodium chloride 0.9 % IV bolus 500 mL  500 mL Intravenous Once Freddy Hernandez MD   Stopped at 12/16/23 2055    [COMPLETED] ipratropium-albuterol (Duoneb) 0.5-2.5 (3) MG/3ML inhalation solution 3 mL  3 mL Nebulization Once Freddy Hernandez MD   3 mL at 12/16/23 1950    [COMPLETED] HYDROmorphone (Dilaudid) 1 MG/ML injection 0.5 mg  0.5 mg Intravenous Q30 Min PRN Freddy Hernandez MD   0.5 mg at 12/16/23 2239    [COMPLETED] iohexol (Omnipaque) 350 MG/ML injection via power injector 100 mL  100 mL Intravenous ONCE PRN Freddy Hernandez MD   100 mL at 12/16/23 2142    [COMPLETED] morphINE PF 4 MG/ML injection 4 mg  4 mg Intravenous Q30 Min PRN Wero Chiang MD   4 mg at 12/19/23 0813     Current Outpatient Medications on File Prior to Encounter   Medication Sig Dispense Refill    morphINE ER 15 MG Oral Tab CR Take 1 tablet (15 mg total) by mouth every 12 (twelve) hours. 60 tablet 0    cyclobenzaprine 5 MG Oral Tab Take 1 tablet (5 mg total) by mouth 3 (three) times daily as needed for Muscle spasms. 30 tablet 0    metoclopramide (REGLAN) 10 MG Oral Tab Take 1 tablet (10 mg total) by mouth 4 (four) times daily before meals and nightly. 120 tablet 2     ondansetron (ZOFRAN) 8 MG tablet Take 1 tablet (8 mg total) by mouth every 8 (eight) hours as needed for Nausea. 30 tablet 3    HYDROcodone-acetaminophen 5-325 MG Oral Tab Take 1 tablet by mouth every 4 (four) hours as needed for Pain. 60 tablet 0    Pancrelipase, Lip-Prot-Amyl, (CREON) 10450-64917 units Oral Cap DR Particles Take 2 capsules with meals and 1 capsule with snacks 240 capsule 0    gabapentin 300 MG Oral Cap Take 1 capsule (300 mg total) by mouth in the morning and 1 capsule (300 mg total) before bedtime. 180 capsule 0    guaiFENesin-codeine 100-10 MG/5ML Oral Solution Take 5 mL by mouth every 6 (six) hours as needed for cough. 237 mL 1    benzonatate 100 MG Oral Cap Take 1 capsule (100 mg total) by mouth 3 (three) times daily as needed for cough. 90 capsule 0    sertraline 100 MG Oral Tab Take 2 tablets (200 mg total) by mouth daily.      losartan 50 MG Oral Tab Take 1 tablet (50 mg total) by mouth daily. 90 tablet 2    Omeprazole 40 MG Oral Capsule Delayed Release Take 1 capsule (40 mg total) by mouth 2 (two) times daily before meals. 90 capsule 3    metoprolol succinate ER 50 MG Oral Tablet 24 Hr TAKE 1 TABLET BY MOUTH EVERY DAY 90 tablet 1       Review of Systems:   A comprehensive 14 point review of systems was completed.    Pertinent positives and negatives noted in the HPI.    Physical Exam:    /74   Pulse 54   Temp 97.6 °F (36.4 °C) (Oral)   Resp 16   Ht 6' 2\" (1.88 m)   Wt 182 lb (82.6 kg)   SpO2 95%   BMI 23.37 kg/m²   General: No acute distress. Alert and oriented x 3.  HEENT: Normocephalic atraumatic. Moist mucous membranes. EOM-I. PERRLA. Anicteric.  Neck: No lymphadenopathy. No JVD. No carotid bruits.  Respiratory: Rhonchorous lung sounds bilaterally worst in lower quadrants  Cardiovascular: S1, S2. Regular rate and rhythm. No murmurs, rubs or gallops. Equal pulses.   Chest and Back: No tenderness or deformity.  Abdomen: Soft, tenderness to palpation in right lower  quadrant, nondistended.  Positive bowel sounds. No rebound, guarding or organomegaly.  Neurologic: No focal neurological deficits. CNII-XII grossly intact.  Musculoskeletal: Moves all extremities.  Extremities: No edema or cyanosis.  Integument: No rashes or lesions.   Psychiatric: Appropriate mood and affect.    Diagnostic Data:      Labs:  Recent Labs   Lab 03/09/24  0604 03/12/24  0925 03/14/24  1800   WBC 4.1 4.2 3.3*   HGB 12.3* 10.5* 11.1*   MCV 91.0 93.7 89.7   .0 193.0 157.0       Recent Labs   Lab 03/09/24  0604 03/12/24  0925 03/14/24  1801   GLU 98 119* 107*   BUN 9 16 13   CREATSERUM 0.31* 0.72 0.52*   CA 8.3* 8.7 9.1   ALB 2.8* 3.1* 3.1*    139 134*   K 3.9 4.3 4.0    107 105   CO2 24.0 29.0 27.0   ALKPHO 101 103 92   AST 16 22 29   ALT 21 26 31   BILT 0.4 0.2 0.4   TP 6.8 7.0 7.2       Estimated Creatinine Clearance: 188.8 mL/min (A) (based on SCr of 0.52 mg/dL (L)).    No results for input(s): \"PTP\", \"INR\" in the last 168 hours.    No results for input(s): \"TROP\", \"CK\" in the last 168 hours.    Imaging: Imaging data reviewed in [facility].  XR CHEST AP PORTABLE  (CPT=71045)    Result Date: 3/14/2024  CONCLUSION:  Small right pleural effusion versus pleural scarring.  Mild scarring/atelectasis in the lower lungs, with underlying infiltrates not excluded.  Clinical correlation recommended.    LOCATION:  [facility]      Dictated by (CST): Immanuel Matthews MD on 3/14/2024 at 6:35 PM     Finalized by (CST): Immanuel Matthews MD on 3/14/2024 at 6:36 PM          ASSESSMENT / PLAN:     # Intractable nausea/vomiting  # Diarrheal illness   - Previous hx C diff with recent abx therapy, high risk for recurrent C diff infection   - C diff testing ordered, GI PCR ordered   - CLD, ADAT, IVF, antiemetics PRN   - CT abdomen pelvis ordered   - GI on consult    - Empiric Oral Vanc started    # Metastatic esophageal carcinoma  # Metastatic pancreatic carcinoma   - Oncology, GI, Pallative care consulted   - Continue  home morphine ER, gabapentin, norco PRN    # Hx recent aspiration PNA   - continue IV abx, end date previously determined to be 3/19    # GERD - continue PPI  # CAD s/p CABG  # Hypertension - Hold home oral antihypertensives given softer BP  # Hyperlipidemia - continue home statin   # Hx C diff - stool studies pending  # Depression - continue sertraline       Code Status: Full Code    Plan of care discussed with patient, ED physician    Glen Myles MD  3/14/2024                Supplementary Documentation:      MDM : Patient's ER labs, imaging reviewed.  A.m. labs ordered.  ER management discussed with ED physician, decision made for patient to be admitted to the hospital for further medical management.

## 2024-03-15 NOTE — ANESTHESIA POSTPROCEDURE EVALUATION
[facility]    Dontae Cortez  Patient Status:  Inpatient   Age/Gender 53 year old male MRN [id number]   Location [facility] ENDOSCOPY PAIN CENTER Attending Jan Brito MD   Hosp Day # 1 PCP Adrian Horowitz MD       Anesthesia Post-op Note    ESOPHAGOGASTRODUODENOSCOPY (EGD) via fluoroscopy with stent placement and suturing    Procedure Summary       Date: 03/15/24 Room / Location:  ENDOSCOPY 01 /  ENDOSCOPY    Anesthesia Start: 1642 Anesthesia Stop: 1734    Procedure: ESOPHAGOGASTRODUODENOSCOPY (EGD) via fluoroscopy with stent placement and suturing Diagnosis: (esophagobronchial fistula)    Surgeons: Raheel Ritchie MD Anesthesiologist: Mirza Chawla DO    Anesthesia Type: general ASA Status: 4            Anesthesia Type: general    Vitals Value Taken Time   /64 03/15/24 1738   Temp 36 03/15/24 1738   Pulse 57 03/15/24 1738   Resp 16 03/15/24 1738   SpO2 94 03/15/24 1738       Patient Location: PACU    Anesthesia Type: general    Airway Patency: patent and extubated    Postop Pain Control: adequate    Mental Status: mildly sedated but able to meaningfully participate in the post-anesthesia evaluation    Nausea/Vomiting: none    Cardiopulmonary/Hydration status: stable euvolemic    Complications: no apparent anesthesia related complications    Postop vital signs: stable    Dental Exam: Unchanged from Preop    Patient to be discharged from PACU when criteria met.

## 2024-03-15 NOTE — PLAN OF CARE
Patient is A/O x4. VSS.  Room air. NPO; PO Medications not given patient unable to swallow and hold food/water/medications down. EGD scheduled for this afternoon consent in chart. Complaints of abdominal pain. PRN IV morphine Q2. Complaints of nausea; PRN zofran given. Port a cath infusing per orders. IV abx infused. Safety precautions in place. Call light within reach.  1545: patient off unit accompanied by transport  and spouse to [facility] for EGD  1830: patient returned from EGD procedure alert and oriented. Complained of pain 10/10. PRN morphine given. Declined dinner. Ice chips given. Patient sleeping.    Problem: GASTROINTESTINAL - ADULT  Goal: Minimal or absence of nausea and vomiting  Description: INTERVENTIONS:  - Maintain adequate hydration with IV or PO as ordered and tolerated  - Nasogastric tube to low intermittent suction as ordered  - Evaluate effectiveness of ordered antiemetic medications  - Provide nonpharmacologic comfort measures as appropriate  - Advance diet as tolerated, if ordered  - Obtain nutritional consult as needed  - Evaluate fluid balance  Outcome: Progressing  Goal: Achieves appropriate nutritional intake (bariatric)  Description: INTERVENTIONS:  - Monitor for over-consumption  - Identify factors contributing to increased intake, treat as appropriate  - Monitor I&O, WT and lab values  - Obtain nutritional consult as needed  - Evaluate psychosocial factors contributing to over-consumption  Outcome: Progressing     Problem: Impaired Swallowing  Goal: Minimize aspiration risk  Description: Interventions:  - Patient should be alert and upright for all feedings (90 degrees preferred)  - Offer food and liquids at a slow rate  - No straws  - Encourage small bites of food and small sips of liquid  - Offer pills one at a time, crush or deliver with applesauce as needed  - Discontinue feeding and notify MD (or speech pathologist) if coughing or persistent throat clearing or wet/gurgly vocal quality  is noted  Outcome: Progressing     Problem: PAIN - ADULT  Goal: Verbalizes/displays adequate comfort level or patient's stated pain goal  Description: INTERVENTIONS:  - Encourage pt to monitor pain and request assistance  - Assess pain using appropriate pain scale  - Administer analgesics based on type and severity of pain and evaluate response  - Implement non-pharmacological measures as appropriate and evaluate response  - Consider cultural and social influences on pain and pain management  - Manage/alleviate anxiety  - Utilize distraction and/or relaxation techniques  - Monitor for opioid side effects  - Notify MD/LIP if interventions unsuccessful or patient reports new pain  - Anticipate increased pain with activity and pre-medicate as appropriate  Outcome: Progressing

## 2024-03-15 NOTE — PROGRESS NOTES
NURSING ADMISSION NOTE      Patient admitted via Cart  Oriented to room.  Safety precautions initiated.  Bed in low position.  Call light in reach.    Patient a/o x4. C/o nausea/ vomiting/ diarrhea. Reports he was not able to keep anything down for 2 days. Stool for C- diff/ gi panel ordered. Isolation initiated. IVF infusing. Planned for CT abdomen/ pelvis. Patient not tolerating oral contrast.   Speech eval offered. Patient refused to speech eval.

## 2024-03-16 VITALS
TEMPERATURE: 98 F | WEIGHT: 170 LBS | DIASTOLIC BLOOD PRESSURE: 65 MMHG | SYSTOLIC BLOOD PRESSURE: 115 MMHG | HEIGHT: 74 IN | HEART RATE: 60 BPM | RESPIRATION RATE: 20 BRPM | OXYGEN SATURATION: 93 % | BODY MASS INDEX: 21.82 KG/M2

## 2024-03-16 LAB
ATRIAL RATE: 52 BPM
P AXIS: 3 DEGREES
P-R INTERVAL: 148 MS
Q-T INTERVAL: 452 MS
QRS DURATION: 84 MS
QTC CALCULATION (BEZET): 420 MS
R AXIS: 41 DEGREES
T AXIS: 60 DEGREES
VENTRICULAR RATE: 52 BPM

## 2024-03-16 PROCEDURE — 99239 HOSP IP/OBS DSCHRG MGMT >30: CPT | Performed by: HOSPITALIST

## 2024-03-16 NOTE — PROGRESS NOTES
[facility]   part of [facility]     Hospitalist Progress Note     Dontae Cortez . Patient Status:  Inpatient    10/15/1970 MRN [id number]   [facility] 4NW-A Attending Jan Brito MD   Hosp Day # 2 PCP Adrian Horowitz MD     Chief Complaint: Intractable n/v    Subjective:     Patient feeling better.  Denies fever, chills, n/v.  No other acute complaints.      Objective:    Review of Systems:   A comprehensive review of systems was completed; pertinent positive and negatives stated in subjective.    Vital signs:  Temp:  [97.5 °F (36.4 °C)-97.8 °F (36.6 °C)] 97.5 °F (36.4 °C)  Pulse:  [50-60] 60  Resp:  [16-22] 20  BP: (115-168)/(52-82) 115/65  SpO2:  [92 %-98 %] 93 %    Physical Exam:    General: No acute distress, pleasant   Respiratory: No wheezes, no rhonchi  Cardiovascular: S1, S2, regular rate and rhythm  Abdomen: Soft, Non-tender, non-distended, positive bowel sounds  Neuro: No new focal deficits.   Extremities: No edema    Diagnostic Data:    Labs:  Recent Labs   Lab 24  0925 24  1800 03/15/24  0643   WBC 4.2 3.3* 4.0   HGB 10.5* 11.1* 10.6*   MCV 93.7 89.7 90.5   .0 157.0 140.0*   INR  --   --  1.21*       Recent Labs   Lab 24  0925 24  1801 03/15/24  0643   * 107* 100*   BUN 16 13 12   CREATSERUM 0.72 0.52* 0.48*   CA 8.7 9.1 9.2   ALB 3.1* 3.1* 2.8*    134* 135*   K 4.3 4.0 3.8    105 104   CO2 29.0 27.0 27.0   ALKPHO 103 92 82   AST 22 29 27   ALT 26 31 24   BILT 0.2 0.4 0.4   TP 7.0 7.2 6.5       Estimated Creatinine Clearance: 191.9 mL/min (A) (based on SCr of 0.48 mg/dL (L)).    No results for input(s): \"TROP\", \"TROPHS\", \"CK\" in the last 168 hours.    Recent Labs   Lab 03/15/24  0643   PTP 15.3*   INR 1.21*                  Microbiology    No results found for this visit on 24.      Imaging: Reviewed in Epic.    Medications:    sodium chloride  1,000 mL Intravenous Once    vancomycin  125 mg Oral Q6H     piperacillin-tazobactam  3.375 g Intravenous Q8H    gabapentin  300 mg Oral BID    metoprolol succinate ER  50 mg Oral Daily    morphINE ER  15 mg Oral Q12H    pantoprazole  40 mg Oral BID AC    lipase-protease-amylase (Lip-Prot-Amyl)  25,000 Units Oral TID CC    sertraline  200 mg Oral Daily    enoxaparin  40 mg Subcutaneous Daily       Assessment & Plan:      # Intractable nausea/vomiting  # Diarrheal illness   - Previous hx C diff with recent abx therapy, high risk for recurrent C diff infection   - C diff testing ordered, GI PCR ordered - pending   - IVF, antiemetics PRN   - CT abdomen pelvis reviewed   - EGD 3/15    - CLD and advance as tolerated    #Malpositined ASD occluder device   - Outpatient fistula closure      # Metastatic esophageal carcinoma  # Metastatic pancreatic carcinoma   - Oncology, GI, Pallative care consulted   - s/p esophagectomy and 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 and ASD occluder device    - Continue home morphine ER, gabapentin, norco PRN     # Hx recent aspiration PNA   - continue IV abx, end date previously determined to be 3/19   - Continue IV zosyn     # GERD - continue PPI  # CAD s/p CABG  # Hypertension - Hold home oral antihypertensives given softer BP  # Hyperlipidemia - continue home statin   # Hx C diff - stool studies pending  # Depression - continue sertraline       Dispo:  Possible discharge today if okay per GI.        Jan Brito MD    Supplementary Documentation:     Quality:  DVT Mechanical Prophylaxis:   SCDs, Early ambuation  DVT Pharmacologic Prophylaxis   Medication    enoxaparin (Lovenox) 40 MG/0.4ML SUBQ injection 40 mg                Code Status: Full Code  Neumann: No urinary catheter in place  Neumann Duration (in days):   Central line:    SHUBHAM:     Discharge is dependent on: EGD  At this point Mr. Cortez is expected to be discharge to: home    The 21st Century Cures Act makes medical notes like these available to patients in the interest  of transparency. Please be advised this is a medical document. Medical documents are intended to carry relevant information, facts as evident, and the clinical opinion of the practitioner. The medical note is intended as peer to peer communication and may appear blunt or direct. It is written in medical language and may contain abbreviations or verbiage that are unfamiliar.

## 2024-03-16 NOTE — PROGRESS NOTES
[facility]  Progress Note    Dontae Cortez Jr. Patient Status:  Inpatient    10/15/1970 MRN [id number]   Location [facility] 4NW-A Attending No att. providers found   Date 3/16/2024 PCP Adrian Horowitz MD     Subjective:  Dontae Cortez Jr. is a(n) 53 year old male with Esophagobronchial fistula post esophageal stenting, feeling better tolerating PO    Objective:  Blood pressure 115/65, pulse 60, temperature 97.5 °F (36.4 °C), temperature source Oral, resp. rate 20, height 6' 2\" (1.88 m), weight 170 lb (77.1 kg), SpO2 93%.    Constitutional: Appearance: well nourished  Psychiatric: Normal affect, orientation, mood  Eyes: Normal sclera and conjunctiva  Cardiovascular: Normal heart  rate.  Ear, nose, mouth and throat: Normal lips  Respiratory: Normal effort.    Labs:          Assessment:  Patient Active Problem List   Diagnosis    Primary hypertension    Hyperlipidemia with target low density lipoprotein (LDL) cholesterol less than 70 mg/dL    Coronary artery disease involving coronary bypass graft of native heart with angina pectoris (HCC)    S/P CABG x 4    Nontoxic multinodular goiter    Pulmonary nodules    Thrombophlebitis leg    BRANDAN (generalized anxiety disorder)    Right shoulder Arthroscopy acromioplasty ,distal  clavicle resection, rotator cuff/labral debridment  Global 2021    Right bicipital tenosynovitis    Malignant neoplasm of esophagus (HCC)    Esophageal anastomotic leak    Esophageal obstruction    On total parenteral nutrition (TPN)    Mechanical complication of esophagostomy (HCC)    Abdominal pain of unknown etiology    Migration of esophageal stent    Gastroparesis    Esophageal carcinoma (HCC)    Normocytic anemia    Esophageal fistula    Subacute cough    Acquired bronchoesophageal fistula (HCC)    Pneumonia of both lower lobes due to infectious organism    Malignant neoplasm of pancreas (HCC)    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 difficile colitis    Dysphagia, unspecified type    Shortness of breath    Hypokalemia    Dysphagia    Narcotic drug use    History of esophageal cancer    Palliative care by specialist    Neoplasm related pain    Endobronchial mass    Hyponatremia    Thrombocytopenia (HCC)    Acute kidney injury (HCC)    Hyperglycemia    Aspiration pneumonitis (HCC)    C. difficile diarrhea    Aspiration pneumonia (HCC)    Malignant neoplasm metastatic to liver (HCC)    Hyperlipidemia    Nausea vomiting and diarrhea    Broncho-esophageal fistula (HCC)    Iron deficiency anemia, unspecified iron deficiency anemia type    Azotemia    Palliative care encounter    Goals of care, counseling/discussion    Cancer related pain       Impression:  Esophagobronchial fistula post esophageal stenting    Plan:  Ok to discharge from GI stand point  Outpatient replacement of septal occluder for esophagobronchial fistula    Total time spent in the care of the patient today: 35 minutes.     Raheel Ritchie MD  3/16/2024  3:49 PM

## 2024-03-16 NOTE — PLAN OF CARE
NURSING DISCHARGE NOTE    Discharged Home via Wheelchair.  Accompanied by Family member  Belongings Taken by patient/family.  Discharge teaching provided and verbalized understanding.    Problem: GASTROINTESTINAL - ADULT  Goal: Minimal or absence of nausea and vomiting  Description: INTERVENTIONS:  - Maintain adequate hydration with IV or PO as ordered and tolerated  - Nasogastric tube to low intermittent suction as ordered  - Evaluate effectiveness of ordered antiemetic medications  - Provide nonpharmacologic comfort measures as appropriate  - Advance diet as tolerated, if ordered  - Obtain nutritional consult as needed  - Evaluate fluid balance  3/16/2024 1136 by Dolly Sharma RN  Outcome: Adequate for Discharge  3/16/2024 1136 by Dolly Sharma RN  Outcome: Progressing  Goal: Achieves appropriate nutritional intake (bariatric)  Description: INTERVENTIONS:  - Monitor for over-consumption  - Identify factors contributing to increased intake, treat as appropriate  - Monitor I&O, WT and lab values  - Obtain nutritional consult as needed  - Evaluate psychosocial factors contributing to over-consumption  3/16/2024 1136 by Dolly Sharma RN  Outcome: Adequate for Discharge  3/16/2024 1136 by Dolly Sharma, RN  Outcome: Progressing

## 2024-03-16 NOTE — CONSULTS
Consultation Note        Dontae Cortez Jr. Patient Status:  Inpatient    10/15/1970 MRN [id number]   formerly [facility] Attending Jan Brito MD   Hosp Day # 1 PCP Adrian Horowitz MD       Reason for Consultation   vomiting       History of Present Illness   Mr Cortez is a 53 year old male with metastatic esophageal cancer s/p esophagectomy and gastric pull through complicated by esophagobronchial fistula s/p stent and most recently ASD occluder device for closure who presents with vomiting. He reports that he suddenly developed nausea and bilious vomiting yesterday out of the blue. It is occurring regardless of if he is eating or drinking. It is non bloody. He denies fevers, chills, abdominal swelling, and abdominal pain.         PMH/MEDS/ALLERGIES/SH/FH:     Past Medical History:   Diagnosis Date    Back problem     Belching 2022    Black stools 2022    Borderline diabetes     Dx in 2013 - HgA1C 6.2%    C. difficile diarrhea 10/23/2022    pt treated and without symptoms    Chest pain 2022    Coronary artery disease     On 13: CABG x 4 with LIMA to LAD and SVG to diagonal, OM and PDA    Decorative tattoo 2000    Depression     Difficult intubation     h/o esophagectomy for CA; developed esophagobronchial vistula    Disorder of liver     LIVER CA    Esophageal cancer (HCC)     completed chemo    Esophageal reflux     Essential hypertension     Exposure to medical diagnostic radiation     last tx 2022    Frequent urination 2013    Gastroparesis     Heartburn 2022    High blood pressure     High cholesterol 2013    Found when I had quadruple bypass    History of COVID-19 10/16/2022    asymptomatic - pt was dx during a hospitalization for another diagnosis. No continued symptoms    History of stomach ulcers     Hyperlipidemia     Hyperlipidemia LDL goal < 70     Indigestion 2022    Morbid obesity with BMI of 40.0-44.9, adult  (HCC)     Muscle weakness     Nausea vomiting and diarrhea 3/14/2024    Nontoxic multinodular goiter     Dx in 8/2013: pt was told that imaging showed thyroid cysts per PCP    Pancreatic cancer (HCC)     last dose 12/4/2023 is scheduled for another round 12/27/23    Peripheral vascular disease (HCC)     pt denies    Personal history of antineoplastic chemotherapy     for esophageal cancer/completed    Personal history of antineoplastic chemotherapy 12/2023    pancreatic cancer    Problems with swallowing 02/01/2022    Pulmonary nodules     Dx in 8/2013: CT chest showed small bilateral fissural-based lung nodules less than 1 cm    S/P CABG x 4     On 8/16/13: CABG x 4 with LIMA to LAD and SVG to diagonal, OM and PDA    Shortness of breath     when coughing; no oxygen    Vomiting 02/01/2022      Past Surgical History:   Procedure Laterality Date    APPENDECTOMY      APPENDECTOMY      ARTHROSCOPY OF JOINT UNLISTED      right shoulder    CABG      On 8/16/13: CABG x 4 with LIMA to LAD and SVG to diagonal, OM and PDA    CARDIAC CATH LAB      On 8/14/2013: cardiac cath showed 3-vessel disease    OTHER SURGICAL HISTORY      1.       Laparoscopic robotic-assisted esophagogastrectomy.    PORT, INDWELLING, IMP          No recently discontinued medications to reconcile   No current facility-administered medications on file prior to encounter.     Current Outpatient Medications on File Prior to Encounter   Medication Sig Dispense Refill    morphINE ER 15 MG Oral Tab CR Take 1 tablet (15 mg total) by mouth every 12 (twelve) hours. 60 tablet 0    ondansetron (ZOFRAN) 8 MG tablet Take 1 tablet (8 mg total) by mouth every 8 (eight) hours as needed for Nausea. 30 tablet 3    HYDROcodone-acetaminophen 5-325 MG Oral Tab Take 1 tablet by mouth every 4 (four) hours as needed for Pain. 60 tablet 0    Pancrelipase, Lip-Prot-Amyl, (CREON) 63858-69198 units Oral Cap DR Particles Take 2 capsules with meals and 1 capsule with snacks 240  capsule 0    gabapentin 300 MG Oral Cap Take 1 capsule (300 mg total) by mouth in the morning and 1 capsule (300 mg total) before bedtime. 180 capsule 0    sertraline 100 MG Oral Tab Take 2 tablets (200 mg total) by mouth daily.      losartan 50 MG Oral Tab Take 1 tablet (50 mg total) by mouth daily. 90 tablet 2    Omeprazole 40 MG Oral Capsule Delayed Release Take 1 capsule (40 mg total) by mouth 2 (two) times daily before meals. 90 capsule 3    metoprolol succinate ER 50 MG Oral Tablet 24 Hr TAKE 1 TABLET BY MOUTH EVERY DAY 90 tablet 1    cyclobenzaprine 5 MG Oral Tab Take 1 tablet (5 mg total) by mouth 3 (three) times daily as needed for Muscle spasms. 30 tablet 0    metoclopramide (REGLAN) 10 MG Oral Tab Take 1 tablet (10 mg total) by mouth 4 (four) times daily before meals and nightly. 120 tablet 2    guaiFENesin-codeine 100-10 MG/5ML Oral Solution Take 5 mL by mouth every 6 (six) hours as needed for cough. 237 mL 1    benzonatate 100 MG Oral Cap Take 1 capsule (100 mg total) by mouth 3 (three) times daily as needed for cough. 90 capsule 0       Current Allergies: No Known Allergies    Social History     Occupational History    Occupation: works as  - on workman's comp   Tobacco Use    Smoking status: Former     Packs/day: 1.00     Years: 27.00     Additional pack years: 0.00     Total pack years: 27.00     Types: Cigarettes     Quit date: 8/15/2013     Years since quitting: 10.5    Smokeless tobacco: Never    Tobacco comments:     Quit smoking 2013   Vaping Use    Vaping Use: Never used   Substance and Sexual Activity    Alcohol use: Not Currently    Drug use: Never    Sexual activity: Not Currently     Partners: Female         Family History   Problem Relation Age of Onset    Cancer Mother         breast and colon     Diabetes Neg           MEDICATIONS      [COMPLETED] morphINE PF 2 MG/ML injection 2 mg  2 mg Intravenous Once    morphINE PF 2 MG/ML injection 1 mg  1 mg Intravenous Q2H PRN    Or     morphINE PF 2 MG/ML injection 2 mg  2 mg Intravenous Q2H PRN    Or    morphINE PF 4 MG/ML injection 4 mg  4 mg Intravenous Q2H PRN    [COMPLETED] sodium chloride 0.9 % IV bolus 1,000 mL  1,000 mL Intravenous Once    sodium chloride 0.9% infusion 1,000 mL  1,000 mL Intravenous Once    [COMPLETED] metoclopramide (Reglan) 5 mg/mL injection 10 mg  10 mg Intravenous Once    [COMPLETED] pantoprazole (Protonix) 40 mg in sodium chloride 0.9% PF 10 mL IV push  40 mg Intravenous Once    [COMPLETED] HYDROmorphone (Dilaudid) 1 MG/ML injection 0.5 mg  0.5 mg Intravenous Q30 Min PRN    [COMPLETED] ondansetron (Zofran) 4 MG/2ML injection 4 mg  4 mg Intravenous Once    [] sodium chloride 0.9% infusion   Intravenous Continuous    [] HYDROmorphone (Dilaudid) 1 MG/ML injection 0.5 mg  0.5 mg Intravenous Q30 Min PRN    [COMPLETED] ipratropium-albuterol (Duoneb) 0.5-2.5 (3) MG/3ML inhalation solution 3 mL  3 mL Nebulization Once    [COMPLETED] ceFAZolin (Ancef) 2 g in 20mL IV syringe premix  2 g Intravenous Once    [COMPLETED] metRONIDAZOLE in sodium chloride 0.79% (Flagyl) 5 mg/mL IVPB premix 500 mg  500 mg Intravenous Once    vancomycin (Vancocin) cap 125 mg  125 mg Oral Q6H    piperacillin-tazobactam (Zosyn) 3.375 g in dextrose 5% 100 mL IVPB-ADDV  3.375 g Intravenous Q8H    cyclobenzaprine (Flexeril) tab 5 mg  5 mg Oral TID PRN    gabapentin (Neurontin) cap 300 mg  300 mg Oral BID    metoprolol succinate ER (Toprol XL) 24 hr tab 50 mg  50 mg Oral Daily    morphINE ER (MS Contin) tab 15 mg  15 mg Oral Q12H    pantoprazole (Protonix) DR tab 40 mg  40 mg Oral BID AC    pancrelipase (Lip-Prot-Amyl) (Zenpep) DR particles cap 25,000 Units  25,000 Units Oral TID CC    sertraline (Zoloft) tab 200 mg  200 mg Oral Daily    lactated ringers infusion   Intravenous Continuous    melatonin tab 3 mg  3 mg Oral Nightly PRN    polyethylene glycol (PEG 3350) (Miralax) 17 g oral packet 17 g  17 g Oral Daily PRN    sennosides (Senokot)  tab 17.2 mg  17.2 mg Oral Nightly PRN    bisacodyl (Dulcolax) 10 MG rectal suppository 10 mg  10 mg Rectal Daily PRN    ondansetron (Zofran) 4 MG/2ML injection 4 mg  4 mg Intravenous Q6H PRN    prochlorperazine (Compazine) 10 MG/2ML injection 5 mg  5 mg Intravenous Q8H PRN    benzonatate (Tessalon) cap 200 mg  200 mg Oral TID PRN    guaiFENesin (Robitussin) 100 MG/5 ML oral liquid 200 mg  200 mg Oral Q4H PRN    glycerin-hypromellose- (Artifical Tears) 0.2-0.2-1 % ophthalmic solution 1 drop  1 drop Both Eyes QID PRN    sodium chloride (Saline Mist) 0.65 % nasal solution 1 spray  1 spray Each Nare Q3H PRN    enoxaparin (Lovenox) 40 MG/0.4ML SUBQ injection 40 mg  40 mg Subcutaneous Daily    acetaminophen (Tylenol) tab 650 mg  650 mg Oral Q4H PRN    Or    HYDROcodone-acetaminophen (Norco) 5-325 MG per tab 1 tablet  1 tablet Oral Q4H PRN    Or    HYDROcodone-acetaminophen (Norco) 5-325 MG per tab 2 tablet  2 tablet Oral Q4H PRN    [COMPLETED] iopamidol 76% (ISOVUE-370) injection for power injector  80 mL Intravenous ONCE PRN    [COMPLETED] morphINE PF 2 MG/ML injection 1 mg  1 mg Intravenous Once              ROS:     A comprehensive 14 point review of systems was completed.  Pertinent positives and negatives noted in the the HPI.          Physical Exam     Vital signs:  /52 (BP Location: Right arm)   Pulse 50   Temp 97.6 °F (36.4 °C) (Oral)   Resp 18   Ht 6' 2\" (1.88 m)   Wt 170 lb (77.1 kg)   SpO2 95%   BMI 21.83 kg/m²         Physical Exam        General: Appears alert, oriented x 3 and in no acute distress.  HEENT: Normal. No scleral icterus.   NECK: Supple. No neck vein distention.   CV: S1 and S2 normal. No murmurs or gallops.  LUNGS: Clear to percussion and auscultation.  ABDOMEN: Soft and non-distended. Non-tender. No masses. Bowel sounds are present.  BACK: No CVA tenderness.  EXTREMITIES: No edema, cyanosis or clubbing.  SKIN: Warm and dry.         IMAGING/LABS       Labs:   Lab Results    Component Value Date    WBC 4.0 03/15/2024    HGB 10.6 03/15/2024    HCT 32.5 03/15/2024    .0 03/15/2024    CREATSERUM 0.48 03/15/2024    BUN 12 03/15/2024     03/15/2024    K 3.8 03/15/2024     03/15/2024    CO2 27.0 03/15/2024     03/15/2024    CA 9.2 03/15/2024    ALB 2.8 03/15/2024    ALKPHO 82 03/15/2024    BILT 0.4 03/15/2024    AST 27 03/15/2024    ALT 24 03/15/2024    INR 1.21 03/15/2024    PTP 15.3 03/15/2024    MG 1.9 03/15/2024    PHOS 2.9 03/15/2024     Recent Labs   Lab 03/12/24  0925 03/14/24  1801 03/15/24  0643   * 107* 100*   BUN 16 13 12   CREATSERUM 0.72 0.52* 0.48*   CA 8.7 9.1 9.2    134* 135*   K 4.3 4.0 3.8    105 104   CO2 29.0 27.0 27.0     Recent Labs   Lab 03/15/24  0643   RBC 3.59*   HGB 10.6*   HCT 32.5*   MCV 90.5   MCH 29.5   MCHC 32.6   RDW 15.2   NEPRELIM 3.17   WBC 4.0   .0*       Recent Labs   Lab 03/12/24  0925 03/14/24  1801 03/15/24  0643   ALT 26 31 24   AST 22 29 27       Imaging:   CT ABDOMEN+PELVIS(CONTRAST ONLY)(CPT=74177)  Narrative: PROCEDURE:  CT ABDOMEN+PELVIS (CONTRAST ONLY) (CPT=74177)     COMPARISON:  [facility] , CT, CT CHEST+ABDOMEN (ALL CNTRST ONLY) (MMD=16167/66602), 3/06/2024, 4:42 PM.     INDICATIONS:  Intractable nausea/vomiting, RLQ abd pain. Hx mestatatic pancreatic and esophageal carcinoma     TECHNIQUE:  CT scanning was performed from the dome of the diaphragm to the pubic symphysis with non-ionic intravenous contrast material. Post contrast coronal MPR imaging was performed.  Dose reduction techniques were used. Dose information is   transmitted to the ACR (American College of Radiology) NRDR (National Radiology Data Registry) which includes the Dose Index Registry.     PATIENT STATED HISTORY:(As transcribed by Technologist)  rlq abd pain       CONTRAST USED:  80cc of Isovue 370     FINDINGS:  LUNG BASE:  Relatively stable reticular nodular opacities, clustered nodularity, and interstitial thickening in  the partially imaged lower lungs may represent changes related to metastatic disease, multifocal pneumonia, with other etiologies not   excluded.  Clinical correlation and continued follow-up is suggested.  LIVER:  1.8 x 1.5 cm mass in segment 8, previously 1.6 x 1.5 cm.  1.2 x 1.1 cm mass in segment 8, previously 1.0 x 1.0 cm.  1.2 x 1.1 cm mass in segment 8/5 is unchanged.  These most likely represent metastatic disease.  Clinical correlation and   continued follow-up is suggested.  BILIARY:  Unremarkable.  SPLEEN:  Splenomegaly measuring up to 14.6 cm.   PANCREAS:  Atrophic, otherwise unremarkable  ADRENALS:  Unremarkable.  KIDNEYS:  Simple appearing 2.3 cm cyst in the lower pole left kidney.  AORTA/VASCULAR:  Moderate mixed plaque in the aorta and iliac arteries.  RETROPERITONEUM:  Unremarkable.  BOWEL/MESENTERY:  Postoperative changes from previous gastric pull-through.  Stable probable changes of fat necrosis in the right side of the omentum.  Moderate feces in the colon.  No large or small bowel dilatation.  No free air.  Small amount of free   fluid in the pelvis.  Uncomplicated colonic diverticulosis.  New nonspecific nodularity within the left side of the small bowel mesentery on image 58 series 2 measuring up to 3.5 x 0.7 cm may represent an area of mesenteric panniculitis, with developing   metastatic disease or other etiologies not entirely excluded.  Clinical correlation recommended.  PET-CT may be of further value.  ABDOMINAL WALL:  Unremarkable.  PELVIC ORGANS:  Unremarkable urinary bladder.  Unremarkable prostate gland.  LYMPH NODES:  No lymphadenopathy in the abdomen or pelvis.  BONES:  Degenerative changes in the spine and hips.   OTHER:  None.                   Impression: CONCLUSION:       1. Relatively stable reticular nodular opacities, clustered nodularity, and interstitial thickening in the partially imaged lower lungs may represent changes related to metastatic disease, multifocal  pneumonia, with other etiologies not excluded.    Clinical correlation and continued follow-up is suggested.     2. 1.8 x 1.5 cm mass in segment 8, previously 1.6 x 1.5 cm.  1.2 x 1.1 cm mass in segment 8, previously 1.0 x 1.0 cm.  1.2 x 1.1 cm mass in segment 8/5 is unchanged.  These most likely represent metastatic disease.  Clinical correlation and continued   follow-up is suggested.     3. Postoperative changes from previous gastric pull-through.  Stable probable changes of fat necrosis in the right side of the omentum.      4. New nonspecific nodularity within the left side of the small bowel mesentery on image 58 series 2 measuring up to 3.5 x 0.7 cm may represent an area of mesenteric panniculitis, with developing metastatic disease or other etiologies not entirely   excluded.  Clinical correlation recommended.  PET-CT may be of further value.     5. Small amount of nonspecific free fluid in the pelvis.     6. Splenomegaly.       Please see above for further details.     LOCATION:  [facility]        Dictated by (CST): Immanuel Matthews MD on 3/14/2024 at 11:09 PM       Finalized by (CST): Immanuel Matthews MD on 3/14/2024 at 11:16 PM     XR CHEST AP PORTABLE  (CPT=71045)  Narrative: PROCEDURE:  XR CHEST AP PORTABLE  (CPT=71045)     TECHNIQUE:  AP chest radiograph was obtained.     COMPARISON:  [facility] , CT, CT CHEST+ABDOMEN (ALL CNTRST ONLY) (QIM=14631/49240), 3/06/2024, 4:42 PM.  [facility] , XR, XR CHEST AP PORTABLE  (CPT=71045), 3/06/2024, 9:28 PM.     INDICATIONS:  DC saturday, vomting and BECKY     PATIENT STATED HISTORY: (As transcribed by Technologist)  Patient stated having difficulty breathing today.          FINDINGS:  CT compatible left chest port tip at the cavoatrial junction.  Borderline heart size with normal pulmonary vascularity.  Small right pleural effusion versus pleural scarring.  Mild scarring/atelectasis in the lower lungs, with underlying   infiltrates not excluded.  Clinical correlation recommended.   No pneumothorax.  Stable scattered surgical clips project over the mediastinum with changes related to previous gastric pull-through.                   Impression: CONCLUSION:  Small right pleural effusion versus pleural scarring.  Mild scarring/atelectasis in the lower lungs, with underlying infiltrates not excluded.  Clinical correlation recommended.          LOCATION:  [facility]                 Dictated by (CST): Immanuel Matthews MD on 3/14/2024 at 6:35 PM       Finalized by (CST): Immanuel Matthews MD on 3/14/2024 at 6:36 PM        3/15 EGD: FINDINGS:  ESOPHAGUS: Post surgical changes c/w distal esophagectomy and gastric pull through, just proximal to the anastomosis we identified that the septal occluder device was not in position and likely migrated as not seen on recent CXR and CT abdomen, and demonstrating the Gi side of the esophagobronchial fistula, distal to this we identified a mucosal opening at the anastomosis which ended representing the proximal opening of a mucosal tunnel/bridge  GASTRIC: Post surgical changes and gastric pull through, patent pyloric, post POEM scar at antrum, migrated esophageal stent into gastric lumen  THERAPEUTICS: Under fluoroscopic support and direct visualization we placed overlapping through the scope TaeWong 20 x 60 mm which was sutured in place at the proximal end with 2-0 prolene at two sites to the esophageal wall and a Hanarostent 20 x 100 mm bridging the fistula       IMPRESSION:   Mr Cortez is a 53 year old male with metastatic esophageal cancer s/p esophagectomy and gastric pull through complicated by esophagobronchial fistula s/p stent and most recently ASD occluder device for closure who presents with vomiting. He had an EGD with Dr. Ritchie on 3/15 that showed the ASD occluder device was malpositioned, exposing the fistula, and 2 stents were placed.             PLAN:   -clear liquid diet, advance as tolerated  -will need fistula closure with ASD occluder device as an  outpatient  -anti-emetics per hospitalist       Kory Roach MD  9:22 PM  3/15/2024  [facility]  [phone]

## 2024-03-16 NOTE — PROGRESS NOTES
Patient alert x4, VSS. Afebrile. No nausea, vomiting or diarrhea noted overnight. Complaint of pain during shift. Pain meds given with moderate relief. All other meds given per MAR. Pt able to swallow clears well. No coughing or chocking while swallowing. IV abx given. Fluids infusing.

## 2024-03-18 ENCOUNTER — PATIENT OUTREACH (OUTPATIENT)
Dept: CASE MANAGEMENT | Age: 54
End: 2024-03-18

## 2024-03-19 NOTE — DISCHARGE SUMMARY
[facility]PITAL[facility]  DISCHARGE SUMMARY     Dontae Cortez Jr. Patient Status:  Inpatient    10/15/1970 MRN [id number]   Location [facility] 4NW-A Attending No att. providers found   Hosp Day # 2 PCP Adrian Horowitz MD     Date of Admission: 3/14/2024  Date of Discharge:  3/16/2024     Discharge Disposition: Home or Self Care    Discharge Diagnosis:    # Intractable nausea/vomiting  # Diarrheal illness  #Malpositined ASD occluder device  # Metastatic esophageal carcinoma  # Metastatic pancreatic carcinoma  # Hx recent aspiration PNA  # GERD   # CAD s/p CABG  # Hypertension   # Hyperlipidemia   # Hx C diff   # Depression    History of Present Illness: Dontae Cortez Jr. is a 53 year old male with PMHx metastatic esophageal carcinoma s/p resection and gastroesophagostomy, metastatic pancreatitic carcinoma, CAD s/p CABG, HTN, HLD, Hx prior C diff who presents with intractable nausea/vomiting.     He was in normal state of health when he started having sudden onset nausea and vomiting yesterday nonbloody nonbilious. states this was unrelated to food however is not able to tolerate any p.o. intake or pills.  Endorsing diffuse abdominal pain which is also started around the same time.  Notes ongoing bowel movements, still has 3-4 loose watery bowel movements which has not gotten worsened in the past few weeks.  Has a history of prior C. difficile colitis.  Recently admitted for aspiration pneumonia and discharged on oral antibiotic therapy.  Wife notes fevers up to 100.1 at home.  Still having cough and sputum production.    Brief Synopsis:   Patient presented with intractable n/v.  He was admitted and underwent an EGD.  Patient had a malpositioned asd occluder device.  His symptoms improved post procedure.  Patient to have outpatient repositioning procedure after discharge.  Patient cleared by GI for discharge home.  He was stable at time of discharge, all questions addressed.  He can resume her regular  home meds at discharge.  He was encouraged to return to the ER if symptoms come back or worsen.      Lace+ Score: 79  59-90 High Risk  29-58 Medium Risk  0-28   Low Risk       TCM Follow-Up Recommendation:  LACE > 58: High Risk of readmission after discharge from the hospital.      Procedures during hospitalization:   EGD 3/15    Incidental or significant findings and recommendations (brief descriptions):  Malpositined ASD occluder device - outpatient closure of ASD    Lab/Test results pending at Discharge:   N/a    Consultants:  Hem/onc, GI    Discharge Medication List:     Discharge Medications        START taking these medications        Instructions Prescription details   OLANZapine 5 MG Tabs  Commonly known as: ZyPREXA      Take 1 tablet (5 mg total) by mouth nightly.   Quantity: 30 tablet  Refills: 3            CONTINUE taking these medications        Instructions Prescription details   benzonatate 100 MG Caps  Commonly known as: Tessalon      Take 1 capsule (100 mg total) by mouth 3 (three) times daily as needed for cough.   Quantity: 90 capsule  Refills: 0     Creon [id number] units Cpep  Generic drug: Pancrelipase (Lip-Prot-Amyl)      Take 2 capsules with meals and 1 capsule with snacks   Quantity: 240 capsule  Refills: 0     cyclobenzaprine 5 MG Tabs  Commonly known as: Flexeril      Take 1 tablet (5 mg total) by mouth 3 (three) times daily as needed for Muscle spasms.   Quantity: 30 tablet  Refills: 0     gabapentin 300 MG Caps  Commonly known as: Neurontin      Take 1 capsule (300 mg total) by mouth in the morning and 1 capsule (300 mg total) before bedtime.   Quantity: 180 capsule  Refills: 0     guaiFENesin-codeine 100-10 MG/5ML Soln  Commonly known as: Robitussin AC      Take 5 mL by mouth every 6 (six) hours as needed for cough.   Quantity: 237 mL  Refills: 1     HYDROcodone-acetaminophen 5-325 MG Tabs  Commonly known as: Norco      Take 1 tablet by mouth every 4 (four) hours as needed for Pain.    Quantity: 60 tablet  Refills: 0     losartan 50 MG Tabs  Commonly known as: Cozaar      Take 1 tablet (50 mg total) by mouth daily.   Quantity: 90 tablet  Refills: 2     metoclopramide 10 MG Tabs  Commonly known as: Reglan      Take 1 tablet (10 mg total) by mouth 4 (four) times daily before meals and nightly.   Stop taking on: Alanna 3, 2024  Quantity: 120 tablet  Refills: 2     metoprolol succinate ER 50 MG Tb24  Commonly known as: Toprol XL      TAKE 1 TABLET BY MOUTH EVERY DAY   Quantity: 90 tablet  Refills: 1     morphINE ER 15 MG Tbcr  Commonly known as: MS Contin      Take 1 tablet (15 mg total) by mouth every 12 (twelve) hours.   Stop taking on: April 11, 2024  Quantity: 60 tablet  Refills: 0     Omeprazole 40 MG Cpdr      Take 1 capsule (40 mg total) by mouth 2 (two) times daily before meals.   Quantity: 90 capsule  Refills: 3     ondansetron 8 MG tablet  Commonly known as: Zofran      Take 1 tablet (8 mg total) by mouth every 8 (eight) hours as needed for Nausea.   Quantity: 30 tablet  Refills: 3     sertraline 100 MG Tabs  Commonly known as: Zoloft      Take 2 tablets (200 mg total) by mouth daily.   Refills: 0               Where to Get Your Medications        These medications were sent to [facility]/pharmacy #[id number] - [place], [place] - [facility] [phone], [phone]  [address] [place] [address]      Phone: [phone]   OLANZapine 5 MG Tabs         [place]    Follow-up appointment:   No follow-up provider specified.  Appointments for Next 30 Days 3/19/2024 - 4/18/2024        Date Arrival Time Visit Type Length Department Provider     3/20/2024  7:00 AM  EGD [10532] 60 min [facility] RITCHIE, MARÍA    Patient Instructions:     Please arrive 60 minutes prior to your scheduled procedure time.                    3/22/2024  9:00 AM  FOLLOW-UP OV [79988] 20 min [facility] Raheel Ritchie MD    Patient Instructions:     Please arrive 15 minutes  prior to your scheduled appointment time.                    3/25/2024  9:30 AM  CHEMOTHERAPY [2076] 60 min [facility] in [place] PF [place] RN1    Patient Instructions:         Location Instructions:     Your appointment is scheduled at the [facility] in [place]. The address is [address] [place] [address]. Please park in the GREEN LOT and enter through the CANCER CENTER ENTRANCE of BUILDING A.. Once you arrive, please register at the Cancer Center  on the 1st floor.  Masks are optional for all patients and visitors, unless otherwise indicated. No care partners/visitors under 18 years of age are allowed in the infusion room.               3/25/2024  9:45 AM  ON TREATMENT VISIT FOLLOW-UP [2641] 15 min [facility] in [place] Katie Conner APRN    Patient Instructions:         Location Instructions:     **IF YOU NEED LABWORK OR AN INFUSION ALONG WITH YOUR APPOINTMENT, YOU MUST CALL TO SCHEDULE.**  Your appointment is scheduled at the [facility] in [place]. The address is [address] [place] [address]. Please park in the GREEN LOT and enter through the CANCER CENTER ENTRANCE of BUILDING A. Once you arrive, please register at the [facility]  on the 1st floor.  Masks are optional for all patients and visitors, unless otherwise indicated.               3/25/2024 10:00 AM  EH HEM/ONC-DIETARY F/U [5522] 30 min [facility] in [place] Kayla Hagen RD    Patient Instructions:     Your appointment is on the [facility] campus in the Cancer Center. The address is [address] [place] [address], [address]. Please register at the Cancer Center  on the second floor.          Location Instructions:     **IF YOU NEED LABWORK OR AN INFUSION ALONG WITH YOUR APPOINTMENT, YOU MUST CALL TO SCHEDULE.**  Your appointment is scheduled at the [facility] in [place]. The address is [address] [place] [address]  [facility]. Please park in the GREEN LOT and enter through the CANCER CENTER ENTRANCE of BUILDING A. Once you arrive, please register at the Cancer Center  on the 1st floor.  Masks are optional for all patients and visitors, unless otherwise indicated.                        -----------------------------------------------------------------------------------------------  PATIENT DISCHARGE INSTRUCTIONS: See electronic chart    Jan Brito MD      The 21st Century Cures Act makes medical notes like these available to patients in the interest of transparency. Please be advised this is a medical document. Medical documents are intended to carry relevant information, facts as evident, and the clinical opinion of the practitioner. The medical note is intended as peer to peer communication and may appear blunt or direct. It is written in medical language and may contain abbreviations or verbiage that are unfamiliar.

## 2024-03-20 ENCOUNTER — APPOINTMENT (OUTPATIENT)
Dept: GENERAL RADIOLOGY | Facility: HOSPITAL | Age: 54
End: 2024-03-20
Attending: INTERNAL MEDICINE
Payer: COMMERCIAL

## 2024-03-20 ENCOUNTER — HOSPITAL ENCOUNTER (OUTPATIENT)
Facility: HOSPITAL | Age: 54
Setting detail: OBSERVATION
Discharge: HOME | End: 2024-03-21
Attending: INTERNAL MEDICINE | Admitting: INTERNAL MEDICINE
Payer: COMMERCIAL

## 2024-03-20 ENCOUNTER — ANESTHESIA (OUTPATIENT)
Dept: ENDOSCOPY | Facility: HOSPITAL | Age: 54
End: 2024-03-20
Payer: COMMERCIAL

## 2024-03-20 ENCOUNTER — ANESTHESIA EVENT (OUTPATIENT)
Dept: ENDOSCOPY | Facility: HOSPITAL | Age: 54
End: 2024-03-20
Payer: COMMERCIAL

## 2024-03-20 DIAGNOSIS — R11.2 NAUSEA AND VOMITING, UNSPECIFIED VOMITING TYPE: ICD-10-CM

## 2024-03-20 DIAGNOSIS — C15.9 MALIGNANT NEOPLASM OF ESOPHAGUS, UNSPECIFIED LOCATION (HCC): ICD-10-CM

## 2024-03-20 DIAGNOSIS — R13.19 ESOPHAGEAL DYSPHAGIA: ICD-10-CM

## 2024-03-20 PROBLEM — J86.0: Status: ACTIVE | Noted: 2024-03-14

## 2024-03-20 PROBLEM — J86.0: Status: ACTIVE | Noted: 2024-01-01

## 2024-03-20 LAB
BASOPHILS NFR BRONCH: 0 %
EOSINOPHIL NFR BRONCH: 0 %
LYMPHOCYTES NFR BRONCH: 3 %
MONOS+MACROS NFR BRONCH: 3 %
NEUTROPHILS NFR BRONCH: 94 %
TOTAL CELLS COUNTED FLD: 100

## 2024-03-20 PROCEDURE — 0DC38ZZ EXTIRPATION OF MATTER FROM LOWER ESOPHAGUS, VIA NATURAL OR ARTIFICIAL OPENING ENDOSCOPIC: ICD-10-PCS | Performed by: INTERNAL MEDICINE

## 2024-03-20 PROCEDURE — 0B9F8ZX DRAINAGE OF RIGHT LOWER LUNG LOBE, VIA NATURAL OR ARTIFICIAL OPENING ENDOSCOPIC, DIAGNOSTIC: ICD-10-PCS | Performed by: INTERNAL MEDICINE

## 2024-03-20 PROCEDURE — 0D738DZ DILATION OF LOWER ESOPHAGUS WITH INTRALUMINAL DEVICE, VIA NATURAL OR ARTIFICIAL OPENING ENDOSCOPIC: ICD-10-PCS | Performed by: INTERNAL MEDICINE

## 2024-03-20 PROCEDURE — 31624 DX BRONCHOSCOPE/LAVAGE: CPT | Performed by: INTERNAL MEDICINE

## 2024-03-20 PROCEDURE — 99223 1ST HOSP IP/OBS HIGH 75: CPT | Performed by: HOSPITALIST

## 2024-03-20 PROCEDURE — 74328 X-RAY BILE DUCT ENDOSCOPY: CPT | Performed by: INTERNAL MEDICINE

## 2024-03-20 DEVICE — SEPTAL OCCLUDER
Type: IMPLANTABLE DEVICE | Site: ESOPHAGUS | Status: FUNCTIONAL
Brand: AMPLATZER™

## 2024-03-20 RX ORDER — NALOXONE HYDROCHLORIDE 0.4 MG/ML
0.08 INJECTION, SOLUTION INTRAMUSCULAR; INTRAVENOUS; SUBCUTANEOUS AS NEEDED
Status: DISCONTINUED | OUTPATIENT
Start: 2024-03-20 | End: 2024-03-20 | Stop reason: HOSPADM

## 2024-03-20 RX ORDER — HYDROMORPHONE HYDROCHLORIDE 1 MG/ML
0.4 INJECTION, SOLUTION INTRAMUSCULAR; INTRAVENOUS; SUBCUTANEOUS EVERY 2 HOUR PRN
Status: DISCONTINUED | OUTPATIENT
Start: 2024-03-20 | End: 2024-03-21

## 2024-03-20 RX ORDER — CYCLOBENZAPRINE HCL 5 MG
5 TABLET ORAL 3 TIMES DAILY PRN
Status: DISCONTINUED | OUTPATIENT
Start: 2024-03-20 | End: 2024-03-21

## 2024-03-20 RX ORDER — SODIUM CHLORIDE, SODIUM LACTATE, POTASSIUM CHLORIDE, CALCIUM CHLORIDE 600; 310; 30; 20 MG/100ML; MG/100ML; MG/100ML; MG/100ML
INJECTION, SOLUTION INTRAVENOUS CONTINUOUS
Status: DISCONTINUED | OUTPATIENT
Start: 2024-03-20 | End: 2024-03-21

## 2024-03-20 RX ORDER — HYDROMORPHONE HYDROCHLORIDE 1 MG/ML
0.8 INJECTION, SOLUTION INTRAMUSCULAR; INTRAVENOUS; SUBCUTANEOUS EVERY 2 HOUR PRN
Status: DISCONTINUED | OUTPATIENT
Start: 2024-03-20 | End: 2024-03-21

## 2024-03-20 RX ORDER — LOSARTAN POTASSIUM 50 MG/1
50 TABLET ORAL DAILY
Status: DISCONTINUED | OUTPATIENT
Start: 2024-03-20 | End: 2024-03-21

## 2024-03-20 RX ORDER — HYDROMORPHONE HYDROCHLORIDE 1 MG/ML
0.2 INJECTION, SOLUTION INTRAMUSCULAR; INTRAVENOUS; SUBCUTANEOUS EVERY 2 HOUR PRN
Status: DISCONTINUED | OUTPATIENT
Start: 2024-03-20 | End: 2024-03-21

## 2024-03-20 RX ORDER — CETIRIZINE HYDROCHLORIDE 10 MG/1
10 TABLET ORAL NIGHTLY
Status: DISCONTINUED | OUTPATIENT
Start: 2024-03-20 | End: 2024-03-21

## 2024-03-20 RX ORDER — MORPHINE SULFATE 15 MG/1
15 TABLET, FILM COATED, EXTENDED RELEASE ORAL EVERY 12 HOURS SCHEDULED
Status: DISCONTINUED | OUTPATIENT
Start: 2024-03-20 | End: 2024-03-21

## 2024-03-20 RX ORDER — HYDROCODONE BITARTRATE AND ACETAMINOPHEN 5; 325 MG/1; MG/1
1 TABLET ORAL EVERY 4 HOURS PRN
Status: DISCONTINUED | OUTPATIENT
Start: 2024-03-20 | End: 2024-03-21

## 2024-03-20 RX ORDER — DEXAMETHASONE SODIUM PHOSPHATE 4 MG/ML
VIAL (ML) INJECTION AS NEEDED
Status: DISCONTINUED | OUTPATIENT
Start: 2024-03-20 | End: 2024-03-20 | Stop reason: SURG

## 2024-03-20 RX ORDER — SODIUM CHLORIDE, SODIUM LACTATE, POTASSIUM CHLORIDE, CALCIUM CHLORIDE 600; 310; 30; 20 MG/100ML; MG/100ML; MG/100ML; MG/100ML
INJECTION, SOLUTION INTRAVENOUS CONTINUOUS
Status: DISCONTINUED | OUTPATIENT
Start: 2024-03-20 | End: 2024-03-20 | Stop reason: HOSPADM

## 2024-03-20 RX ORDER — PROCHLORPERAZINE EDISYLATE 5 MG/ML
5 INJECTION INTRAMUSCULAR; INTRAVENOUS EVERY 8 HOURS PRN
Status: DISCONTINUED | OUTPATIENT
Start: 2024-03-20 | End: 2024-03-20 | Stop reason: HOSPADM

## 2024-03-20 RX ORDER — CEFAZOLIN SODIUM/WATER 2 G/20 ML
SYRINGE (ML) INTRAVENOUS AS NEEDED
Status: DISCONTINUED | OUTPATIENT
Start: 2024-03-20 | End: 2024-03-20 | Stop reason: SURG

## 2024-03-20 RX ORDER — MIDAZOLAM HYDROCHLORIDE 1 MG/ML
1 INJECTION INTRAMUSCULAR; INTRAVENOUS EVERY 5 MIN PRN
Status: DISCONTINUED | OUTPATIENT
Start: 2024-03-20 | End: 2024-03-20 | Stop reason: HOSPADM

## 2024-03-20 RX ORDER — NEOSTIGMINE METHYLSULFATE 1 MG/ML
INJECTION, SOLUTION INTRAVENOUS AS NEEDED
Status: DISCONTINUED | OUTPATIENT
Start: 2024-03-20 | End: 2024-03-20 | Stop reason: SURG

## 2024-03-20 RX ORDER — OLANZAPINE 5 MG/1
5 TABLET ORAL NIGHTLY
Status: DISCONTINUED | OUTPATIENT
Start: 2024-03-20 | End: 2024-03-21

## 2024-03-20 RX ORDER — GABAPENTIN 300 MG/1
300 CAPSULE ORAL 2 TIMES DAILY
Status: DISCONTINUED | OUTPATIENT
Start: 2024-03-20 | End: 2024-03-21

## 2024-03-20 RX ORDER — PANTOPRAZOLE SODIUM 40 MG/1
40 TABLET, DELAYED RELEASE ORAL
Status: DISCONTINUED | OUTPATIENT
Start: 2024-03-20 | End: 2024-03-21

## 2024-03-20 RX ORDER — SERTRALINE HYDROCHLORIDE 100 MG/1
200 TABLET, FILM COATED ORAL DAILY
Status: DISCONTINUED | OUTPATIENT
Start: 2024-03-20 | End: 2024-03-21

## 2024-03-20 RX ORDER — MEPERIDINE HYDROCHLORIDE 25 MG/ML
12.5 INJECTION INTRAMUSCULAR; INTRAVENOUS; SUBCUTANEOUS AS NEEDED
Status: DISCONTINUED | OUTPATIENT
Start: 2024-03-20 | End: 2024-03-20 | Stop reason: HOSPADM

## 2024-03-20 RX ORDER — ONDANSETRON 2 MG/ML
INJECTION INTRAMUSCULAR; INTRAVENOUS AS NEEDED
Status: DISCONTINUED | OUTPATIENT
Start: 2024-03-20 | End: 2024-03-20 | Stop reason: SURG

## 2024-03-20 RX ORDER — HYDROMORPHONE HYDROCHLORIDE 1 MG/ML
0.6 INJECTION, SOLUTION INTRAMUSCULAR; INTRAVENOUS; SUBCUTANEOUS EVERY 5 MIN PRN
Status: DISCONTINUED | OUTPATIENT
Start: 2024-03-20 | End: 2024-03-20 | Stop reason: HOSPADM

## 2024-03-20 RX ORDER — ONDANSETRON 2 MG/ML
4 INJECTION INTRAMUSCULAR; INTRAVENOUS EVERY 6 HOURS PRN
Status: DISCONTINUED | OUTPATIENT
Start: 2024-03-20 | End: 2024-03-20 | Stop reason: HOSPADM

## 2024-03-20 RX ORDER — HYDROMORPHONE HYDROCHLORIDE 1 MG/ML
0.4 INJECTION, SOLUTION INTRAMUSCULAR; INTRAVENOUS; SUBCUTANEOUS EVERY 5 MIN PRN
Status: DISCONTINUED | OUTPATIENT
Start: 2024-03-20 | End: 2024-03-20 | Stop reason: HOSPADM

## 2024-03-20 RX ORDER — GLYCOPYRROLATE 0.2 MG/ML
INJECTION, SOLUTION INTRAMUSCULAR; INTRAVENOUS AS NEEDED
Status: DISCONTINUED | OUTPATIENT
Start: 2024-03-20 | End: 2024-03-20 | Stop reason: SURG

## 2024-03-20 RX ORDER — HYDROMORPHONE HYDROCHLORIDE 1 MG/ML
INJECTION, SOLUTION INTRAMUSCULAR; INTRAVENOUS; SUBCUTANEOUS
Status: COMPLETED
Start: 2024-03-20 | End: 2024-03-20

## 2024-03-20 RX ORDER — HYDROMORPHONE HYDROCHLORIDE 1 MG/ML
0.2 INJECTION, SOLUTION INTRAMUSCULAR; INTRAVENOUS; SUBCUTANEOUS EVERY 5 MIN PRN
Status: DISCONTINUED | OUTPATIENT
Start: 2024-03-20 | End: 2024-03-20 | Stop reason: HOSPADM

## 2024-03-20 RX ORDER — HYDRALAZINE HYDROCHLORIDE 20 MG/ML
INJECTION INTRAMUSCULAR; INTRAVENOUS AS NEEDED
Status: DISCONTINUED | OUTPATIENT
Start: 2024-03-20 | End: 2024-03-20 | Stop reason: SURG

## 2024-03-20 RX ORDER — METOPROLOL SUCCINATE 50 MG/1
50 TABLET, EXTENDED RELEASE ORAL
Status: DISCONTINUED | OUTPATIENT
Start: 2024-03-20 | End: 2024-03-21

## 2024-03-20 RX ADMIN — DEXAMETHASONE SODIUM PHOSPHATE 4 MG: 4 MG/ML VIAL (ML) INJECTION at 08:26:00

## 2024-03-20 RX ADMIN — NEOSTIGMINE METHYLSULFATE 3 MG: 1 INJECTION, SOLUTION INTRAVENOUS at 08:52:00

## 2024-03-20 RX ADMIN — HYDRALAZINE HYDROCHLORIDE 10 MG: 20 INJECTION INTRAMUSCULAR; INTRAVENOUS at 07:42:00

## 2024-03-20 RX ADMIN — ONDANSETRON 4 MG: 2 INJECTION INTRAMUSCULAR; INTRAVENOUS at 08:49:00

## 2024-03-20 RX ADMIN — GLYCOPYRROLATE 0.6 MG: 0.2 INJECTION, SOLUTION INTRAMUSCULAR; INTRAVENOUS at 08:52:00

## 2024-03-20 RX ADMIN — CEFAZOLIN SODIUM/WATER: 2 G/20 ML SYRINGE (ML) INTRAVENOUS at 09:04:00

## 2024-03-20 RX ADMIN — CEFAZOLIN SODIUM/WATER 2 G: 2 G/20 ML SYRINGE (ML) INTRAVENOUS at 07:25:00

## 2024-03-20 RX ADMIN — SODIUM CHLORIDE, SODIUM LACTATE, POTASSIUM CHLORIDE, CALCIUM CHLORIDE: 600; 310; 30; 20 INJECTION, SOLUTION INTRAVENOUS at 07:21:00

## 2024-03-20 RX ADMIN — SODIUM CHLORIDE, SODIUM LACTATE, POTASSIUM CHLORIDE, CALCIUM CHLORIDE: 600; 310; 30; 20 INJECTION, SOLUTION INTRAVENOUS at 09:04:00

## 2024-03-20 NOTE — OPERATIVE REPORT
Dontae Cortez Jr. Patient Status:  Hospital Outpatient Surgery    10/15/1970 MRN [id number]   [facility] ENDOSCOPY PAIN CENTER Attending Raheel Ritchei MD   Date 3/20/2024 PCP Adrian Horowitz MD     PREOPERATIVE DIAGNOSIS/INDICATION: Bronchoesophageal fistula, post esophagectomy, esophagogastric anastomosis, esophagogastric anastomosis ulceration, prior failed septal occluder, bronchial and esophageal stenting  POSTOPERTATIVE DIAGNOSIS: Same  PROCEDURE PERFORMED: EGD with foreign body retrieval and fistula closure with septal occluder device, concomitant bronchoscopy, see separate report  TIME OUT WAS PERFORMED    SEDATION: General Anesthesia with endotracheal intubation     INFORMED CONSENT: Risks, benefits and alternatives to the procedure were explained to the patient including but not limited to bleeding, infection, perforation, adverse drug reactions, pancreatitis and the need for hospitalization and surgery if this occurs, the patient understands and agrees to procedure.  PROCEDURE DESCRIPTION: A regular upper endoscope was introduced into the patient’s mouth, hypo pharynx, esophagus, stomach and the first and second portion of the duodenum, retroflexion of the endoscope was performed in the stomach. Careful examination of the above described areas was performed on withdrawal of the endoscope. The patient tolerated the procedure well, there were no immediate complication immediately following the procedure, and the patient was transferred to recovery in stable condition.  FINDINGS:  ESOPHAGUS: Post surgical changes c/w partial esophagectomy, gastric pull through and esophago-gastrostomy, previously placed overlapping stents in place  Anastomosis: After stent retrieval showed foreign suture material , ulcerated anastomosis and distal esophagus with active  fistula tract into the bronchial tree measuring 10 mm in diameter  GASTRIC: Post surgical changes and gastric pull through, patent  pyloric, post POEM scar at antrum  THERAPEUTICS: The old stent was removed en block with the scope after cutting proximal end sutures with endoscopic scissors and after grasping proximal end purse string suture with a rat tooth forceps and by gentle traction collapsing the proximal end of the stent x 2, no complications noted, we then under direct visualization with the gastroscope and bronchoscope and fluoroscopic support delineated the fistula tract, we then through the scope advanced a stiff angle tip 450 cm guidewire distal end inside the bronchial tree, we then removed the scope leaving the guidewire behind, we then reintroduced the scope side by side with the guidewire, we then advanced over the guidewire a Amplatzer Trevisio delivery system pass the fistula tract into the bronchial tree, we then through the delivery system advanced a Amplatzer septal occluder 12 mm in diameter with flared ends and deployed the distal end at the bronchial tree and proximal end in the anastomosis inside the GI lumen  RECOMMENDATIONS:   Post EGD precautions, watch for bleeding, infection, perforation, adverse drug reactions   If persistent symptoms consider concomitant bronchial and esophageal stenting and thoracic surgery consult     Raheel Ritchie MD  3/20/2024  8:58 AM

## 2024-03-20 NOTE — DISCHARGE SUMMARY
Outpatient Surgery Brief Discharge Summary         Patient ID:  Dontae Cortez Jr.  [id number]  53 year old  10/15/1970    Discharge Diagnoses: BRONCHOESOPHAGEAL FISTULA    Procedures:bronchoscopy assistance for endoscopic ASD occluder device placement    Discharged Condition: stable    Disposition: home    Patient Instructions: Follow-up with Chapo Madrid MD in 1-2 weeks.    Diet: regular diet  Activity: as tolerated    Chapo Madrid MD  3/20/2024  9:13 AM

## 2024-03-20 NOTE — DISCHARGE INSTRUCTIONS
Post Bronchoscopy Discharge Instructions    Once the numbness in your throat and the anesthesia wear off, you may eat normally. You may consider avoiding tough foods for the next 24 hours as you may have a sore throat.  Throat lozenges such as Cepacol or Halls, may help relieve the discomfort.   A low grade fever and a small amount of bloody sputum is expected but should resolve within the next 1-2 days. You may take acetaminophen (tylenol) for the fever, if needed.  Do not drive or operate heavy machinery, drink alcohol, or sign any important documents for 24 hours. Please contact us if bloody sputum, fever, or other respiratory symptoms persist beyond this period or worsen.   Follow-up as previously scheduled.

## 2024-03-20 NOTE — H&P
[facility]  Pre-op H and P    Dontae Cortez Jr. Patient Status:  Hospital Outpatient Surgery    10/15/1970 MRN [id number]   Location [facility] ENDOSCOPY PAIN CENTER Attending Raheel Ritchie MD   Date 3/20/2024 PCP Adrian Horowitz MD     CC: Esophagobronchial fistula    History of Present Illness:  Dontae Orteganer Fritz is a a(n) 53 year old male. Esophagobronchial fistula    History:  Past Medical History:   Diagnosis Date    Back problem     Belching 2022    Black stools 2022    Borderline diabetes     Dx in 2013 - HgA1C 6.2%    C. difficile diarrhea 10/23/2022    pt treated and without symptoms    Chest pain 2022    Coronary artery disease     On 13: CABG x 4 with LIMA to LAD and SVG to diagonal, OM and PDA    Decorative tattoo 2000    Depression     Difficult intubation     h/o esophagectomy for CA; developed esophagobronchial vistula    Disorder of liver     LIVER CA    Esophageal cancer (HCC)     completed chemo    Esophageal reflux     Essential hypertension     Exposure to medical diagnostic radiation     last tx 2022    Frequent urination 2013    Gastroparesis     Heartburn 2022    High blood pressure     High cholesterol 2013    Found when I had quadruple bypass    History of COVID-19 10/16/2022    asymptomatic - pt was dx during a hospitalization for another diagnosis. No continued symptoms    History of stomach ulcers     Hyperlipidemia     Hyperlipidemia LDL goal < 70     Indigestion 2022    Morbid obesity with BMI of 40.0-44.9, adult (HCC)     Muscle weakness     Nausea vomiting and diarrhea 2024    Nontoxic multinodular goiter     Dx in 2013: pt was told that imaging showed thyroid cysts per PCP    Pancreatic cancer (HCC)     last dose 2023 is scheduled for another round 23    Peripheral vascular disease (HCC)     pt denies    Personal history of antineoplastic chemotherapy     for esophageal  cancer/completed    Personal history of antineoplastic chemotherapy 12/2023    pancreatic cancer    Personal history of antineoplastic chemotherapy     Last treatment 3/12/24    Problems with swallowing 02/01/2022    Pulmonary nodules     Dx in 8/2013: CT chest showed small bilateral fissural-based lung nodules less than 1 cm    S/P CABG x 4     On 8/16/13: CABG x 4 with LIMA to LAD and SVG to diagonal, OM and PDA    Shortness of breath     when coughing; no oxygen    Vomiting 02/01/2022     Past Surgical History:   Procedure Laterality Date    APPENDECTOMY      APPENDECTOMY      ARTHROSCOPY OF JOINT UNLISTED      right shoulder    CABG      On 8/16/13: CABG x 4 with LIMA to LAD and SVG to diagonal, OM and PDA    CARDIAC CATH LAB      On 8/14/2013: cardiac cath showed 3-vessel disease    OTHER SURGICAL HISTORY      1.       Laparoscopic robotic-assisted esophagogastrectomy.    PORT, INDWELLING, IMP       Family History   Problem Relation Age of Onset    Cancer Mother         breast and colon     Diabetes Neg       reports that he quit smoking about 10 years ago. His smoking use included cigarettes. He has a 27 pack-year smoking history. He has never used smokeless tobacco. He reports that he does not currently use alcohol. He reports that he does not use drugs.    Allergies:  No Known Allergies    Medications:    Current Facility-Administered Medications:     lactated ringers infusion, , Intravenous, Continuous    Facility-Administered Medications Ordered in Other Encounters:     ceFAZolin (Ancef) 2 g in 20mL IV syringe premix, , Intravenous, PRN    fentaNYL (Sublimaze) 50 mcg/mL injection, , Intravenous, PRN    hydrALAzine (Apresoline) 20 mg/mL injection, , Intravenous, PRN    dexamethasone (Decadron) 4 MG/ML injection, , Intravenous, PRN    ondansetron (Zofran) 4 MG/2ML injection, , Intravenous, PRN    neostigmine (Bloxiverz) 10 mg/10mL injection, , Intravenous, PRN    glycopyrrolate (Robinul) 0.2 MG/ML injection,  , Intravenous, PRN    Physical Exam:    Blood pressure 149/87, pulse 58, temperature 97.8 °F (36.6 °C), temperature source Temporal, resp. rate 18, height 6' 2\" (1.88 m), weight 170 lb (77.1 kg), SpO2 95%.    General: Appears alert, oriented x3 and in no acute distress.  CV: Normal rate   Lungs: Normal effort   Skin: Warm and dry.  Laboratory Data:         Assessment/Plan/Procedure:  Patient Active Problem List   Diagnosis    Primary hypertension    Hyperlipidemia with target low density lipoprotein (LDL) cholesterol less than 70 mg/dL    Coronary artery disease involving coronary bypass graft of native heart with angina pectoris (HCC)    S/P CABG x 4    Nontoxic multinodular goiter    Pulmonary nodules    Thrombophlebitis leg    BRANDAN (generalized anxiety disorder)    Right shoulder Arthroscopy acromioplasty ,distal  clavicle resection, rotator cuff/labral debridment  Global 05/13/2021    Right bicipital tenosynovitis    Malignant neoplasm of esophagus (HCC)    Esophageal anastomotic leak    Esophageal obstruction    On total parenteral nutrition (TPN)    Mechanical complication of esophagostomy (HCC)    Abdominal pain of unknown etiology    Migration of esophageal stent    Gastroparesis    Esophageal carcinoma (HCC)    Normocytic anemia    Esophageal fistula    Subacute cough    Acquired bronchoesophageal fistula (HCC)    Pneumonia of both lower lobes due to infectious organism    Malignant neoplasm of pancreas (HCC)    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 difficile colitis    Dysphagia, unspecified type    Shortness of breath    Hypokalemia    Dysphagia    Narcotic drug use    History of esophageal cancer     Palliative care by specialist    Neoplasm related pain    Endobronchial mass    Hyponatremia    Thrombocytopenia (HCC)    Acute kidney injury (HCC)    Hyperglycemia    Aspiration pneumonitis (HCC)    C. difficile diarrhea    Aspiration pneumonia (HCC)    Malignant neoplasm metastatic to liver (HCC)    Hyperlipidemia    Nausea vomiting and diarrhea    Broncho-esophageal fistula (HCC)    Iron deficiency anemia, unspecified iron deficiency anemia type    Azotemia    Palliative care encounter    Goals of care, counseling/discussion    Cancer related pain       Esophagobronchial fistula    Plan:  EGD    Raheel Ritchie MD  3/20/2024  8:58 AM

## 2024-03-20 NOTE — INTERVAL H&P NOTE
Pre-op Diagnosis: Malignant neoplasm of esophagus, unspecified location (HCC) [C15.9]  Nausea and vomiting, unspecified vomiting type [R11.2]  Esophageal dysphagia [R13.19]    The above referenced H&P was reviewed by Chapo Madrid MD on 3/20/2024, the patient was examined and since last visit he has had continued symptoms including concern for leakage around the ASD occluder with plans for replacement with new ASD with Dr. Ritchie.  No other changes have occurred in the patient's condition since the H&P was performed.  I discussed with the patient and/or legal representative the potential benefits, risks and side effects of this procedure; the likelihood of the patient achieving goals; and potential problems that might occur during recuperation.  I discussed reasonable alternatives to the procedure, including risks, benefits and side effects related to the alternatives and risks related to not receiving this procedure.  We will proceed with procedure as planned.

## 2024-03-20 NOTE — ANESTHESIA PROCEDURE NOTES
Airway  Date/Time: 3/20/2024 7:30 AM  Urgency: elective    Difficult airway    General Information and Staff    Patient location during procedure: endo  Anesthesiologist: El Oquendo MD  Performed: anesthesiologist   Performed by: El Oquendo MD  Authorized by: El Oquendo MD      Indications and Patient Condition  Indications for airway management: anesthesia  Sedation level: deep  Preoxygenated: yes  Patient position: sniffing  Mask difficulty assessment: 1 - vent by mask  Planned trial extubation    Final Airway Details  Final airway type: endotracheal airway      Successful airway: ETT  Cuffed: yes   Successful intubation technique: Video laryngoscopy  Endotracheal tube insertion site: oral  Blade: GlideScope  Blade size: #4  ETT size (mm): 8.5    Cormack-Lehane Classification: grade IIA - partial view of glottis  Placement verified by: capnometry   Measured from: lips  ETT to lips (cm): 20  Number of attempts at approach: 1

## 2024-03-20 NOTE — CONSULTS
[place] HOSPITALIST  CONSULT     Dontae Cortez Jr. Patient Status:  Observation    10/15/1970 MRN [id number]   Location [facility] 3NW-A Attending Raheel Ritchie MD   Hosp Day # 0 PCP Adrian Horowitz MD     Reason for consult: Medical management    Requested by: Dr. Ritchie    Subjective:   History of Present Illness:     Dontae Cortez Jr. is a 53 year old male with a PMH of recently diagnosed C. 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 Procedure uncomplicated. Patient does admit some pain in lower chest. Denies SOB or cough. Denies N/V.      History/Other:    Past Medical History:  Past Medical History:   Diagnosis Date    Back problem     Belching 2022    Black stools 2022    Borderline diabetes     Dx in 2013 - HgA1C 6.2%    C. difficile diarrhea 10/23/2022    pt treated and without symptoms    Chest pain 2022    Coronary artery disease     On 13: CABG x 4 with LIMA to LAD and SVG to diagonal, OM and PDA    Decorative tattoo 2000    Depression     Difficult intubation     h/o esophagectomy for CA; developed esophagobronchial vistula    Disorder of liver     LIVER CA    Esophageal cancer (HCC)     completed chemo    Esophageal reflux     Essential hypertension     Exposure to medical diagnostic radiation     last tx 2022    Frequent urination 2013    Gastroparesis     Heartburn 2022    High blood pressure     High cholesterol 2013    Found when I had quadruple bypass    History of COVID-19 10/16/2022    asymptomatic - pt was dx during a hospitalization for another diagnosis. No continued symptoms    History of stomach ulcers     Hyperlipidemia     Hyperlipidemia LDL goal < 70     Indigestion 2022    Morbid obesity  with BMI of 40.0-44.9, adult (HCC)     Muscle weakness     Nausea vomiting and diarrhea 03/14/2024    Nontoxic multinodular goiter     Dx in 8/2013: pt was told that imaging showed thyroid cysts per PCP    Pancreatic cancer (HCC)     last dose 12/4/2023 is scheduled for another round 12/27/23    Peripheral vascular disease (HCC)     pt denies    Personal history of antineoplastic chemotherapy     for esophageal cancer/completed    Personal history of antineoplastic chemotherapy 12/2023    pancreatic cancer    Personal history of antineoplastic chemotherapy     Last treatment 3/12/24    Problems with swallowing 02/01/2022    Pulmonary nodules     Dx in 8/2013: CT chest showed small bilateral fissural-based lung nodules less than 1 cm    S/P CABG x 4     On 8/16/13: CABG x 4 with LIMA to LAD and SVG to diagonal, OM and PDA    Shortness of breath     when coughing; no oxygen    Vomiting 02/01/2022     Past Surgical History:   Past Surgical History:   Procedure Laterality Date    APPENDECTOMY      APPENDECTOMY      ARTHROSCOPY OF JOINT UNLISTED      right shoulder    CABG      On 8/16/13: CABG x 4 with LIMA to LAD and SVG to diagonal, OM and PDA    CARDIAC CATH LAB      On 8/14/2013: cardiac cath showed 3-vessel disease    OTHER SURGICAL HISTORY      1.       Laparoscopic robotic-assisted esophagogastrectomy.    PORT, INDWELLING, IMP        Family History:   Family History   Problem Relation Age of Onset    Cancer Mother         breast and colon     Diabetes Neg      Social History:    reports that he quit smoking about 10 years ago. His smoking use included cigarettes. He has a 27 pack-year smoking history. He has never used smokeless tobacco. He reports that he does not currently use alcohol. He reports that he does not use drugs.     Allergies: No Known Allergies    Medications:    Current Facility-Administered Medications on File Prior to Encounter   Medication Dose Route Frequency Provider Last Rate Last Admin     [COMPLETED] morphINE PF 2 MG/ML injection 2 mg  2 mg Intravenous Once Adilia Talley MD   2 mg at 03/15/24 0802    [COMPLETED] sodium chloride 0.9 % IV bolus 1,000 mL  1,000 mL Intravenous Once Lary Bernstein MD 1,000 mL/hr at 24 1803 1,000 mL at 24 1803    [COMPLETED] metoclopramide (Reglan) 5 mg/mL injection 10 mg  10 mg Intravenous Once Lary Bernstein MD   10 mg at 24    [COMPLETED] pantoprazole (Protonix) 40 mg in sodium chloride 0.9% PF 10 mL IV push  40 mg Intravenous Once Lary Bernstein MD   40 mg at 24    [COMPLETED] HYDROmorphone (Dilaudid) 1 MG/ML injection 0.5 mg  0.5 mg Intravenous Q30 Min PRN Glen Myles MD   0.5 mg at 24    [COMPLETED] ondansetron (Zofran) 4 MG/2ML injection 4 mg  4 mg Intravenous Once Lary Bernstein MD   4 mg at 24    [] sodium chloride 0.9% infusion   Intravenous Continuous Lary Bernstein MD   Stopped at 24 2100    [] HYDROmorphone (Dilaudid) 1 MG/ML injection 0.5 mg  0.5 mg Intravenous Q30 Min PRN Lary Bernstein MD        [COMPLETED] ipratropium-albuterol (Duoneb) 0.5-2.5 (3) MG/3ML inhalation solution 3 mL  3 mL Nebulization Once Lary Bernstein MD   3 mL at 24    [COMPLETED] ceFAZolin (Ancef) 2 g in 20mL IV syringe premix  2 g Intravenous Once Lary Bernstein MD   2 g at 24    [COMPLETED] metRONIDAZOLE in sodium chloride 0.79% (Flagyl) 5 mg/mL IVPB premix 500 mg  500 mg Intravenous Once Lary Bernstein MD   Stopped at 24    [COMPLETED] iopamidol 76% (ISOVUE-370) injection for power injector  80 mL Intravenous ONCE PRN Nixon Vergara MD   80 mL at 24    [COMPLETED] morphINE PF 2 MG/ML injection 1 mg  1 mg Intravenous Once Roxanne Fitch DO   1 mg at 24    [COMPLETED] ondansetron (Zofran) 16 mg, dexAMETHasone (Decadron) 20 mg in sodium chloride 0.9% 110 mL IVPB   Intravenous Once  Cristina Brown APRN   Stopped at 03/12/24 1143    [COMPLETED] nivolumab (Opdivo) 240 mg in sodium chloride 0.9% 124 mL IVPB  240 mg Intravenous Once Cristina Brown APRN   Stopped at 03/12/24 1123    [COMPLETED] trastuzumab-qyyp (Trazimera) 336 mg in sodium chloride 0.9% 266 mL infusion  4 mg/kg (Treatment Plan Recorded) Intravenous Once Cristina Brown APRN   Stopped at 03/12/24 1219    [COMPLETED] oxaliplatin (Eloxatin) 155 mg in dextrose 5% 281 mL infusion  75 mg/m2 (Treatment Plan Recorded) Intravenous Once Cristina Brown APRN   Stopped at 03/12/24 1427    [COMPLETED] leucovorin 850 mg in dextrose 5% 250 mL infusion  400 mg/m2 (Treatment Plan Recorded) Intravenous Once Cristina Brown APRN   Stopped at 03/12/24 1427    [COMPLETED] iopamidol 76% (ISOVUE-370) injection for power injector  80 mL Intravenous ONCE PRN Raheel Ritchie MD   80 mL at 03/06/24 1650    [COMPLETED] heparin (Porcine) 100 Units/mL lock flush 500 Units  5 mL Intravenous Once Raheel Ritchie MD   500 Units at 03/06/24 1655    [COMPLETED] HYDROmorphone (Dilaudid) 1 MG/ML injection 0.5 mg  0.5 mg Intravenous Q30 Min PRN Cat Burt MD   0.5 mg at 03/06/24 2347    [COMPLETED] ondansetron (Zofran) 4 MG/2ML injection 4 mg  4 mg Intravenous Once Cat Burt MD   4 mg at 03/06/24 2123    [COMPLETED] sodium chloride 0.9 % IV bolus 1,000 mL  1,000 mL Intravenous Once Cat Burt MD   Stopped at 03/06/24 2302    [COMPLETED] piperacillin-tazobactam (Zosyn) 4.5 g in dextrose 5% 100 mL IVPB-ADDV  4.5 g Intravenous Once Cat Burt MD   Stopped at 03/06/24 2250    [COMPLETED] ondansetron (Zofran) 16 mg, dexAMETHasone (Decadron) 20 mg in sodium chloride 0.9% 110 mL IVPB   Intravenous Once Katie Conner APRN   Stopped at 02/22/24 1204    [COMPLETED] nivolumab (Opdivo) 240 mg in sodium chloride 0.9% 124 mL IVPB  240 mg Intravenous Once Katie Conner APRN   Stopped at 02/22/24 1103    [COMPLETED] trastuzumab-qyyp  (Trazimera) 336 mg in sodium chloride 0.9% 266 mL infusion  4 mg/kg (Treatment Plan Recorded) Intravenous Once Katie Conner APRN   Stopped at 02/22/24 1144    [COMPLETED] oxaliplatin (Eloxatin) 155 mg in dextrose 5% 281 mL infusion  75 mg/m2 (Treatment Plan Recorded) Intravenous Once Katie Conner APRN   Stopped at 02/22/24 1415    [COMPLETED] leucovorin 850 mg in dextrose 5% 250 mL infusion  400 mg/m2 (Treatment Plan Recorded) Intravenous Once Katie Conner APRN   Stopped at 02/22/24 1415    [COMPLETED] ondansetron (Zofran) 16 mg, dexAMETHasone (Decadron) 20 mg in sodium chloride 0.9% 110 mL IVPB   Intravenous Once Senia Foster MD   Stopped at 02/05/24 1141    [COMPLETED] trastuzumab-qyyp (Trazimera) 336 mg in sodium chloride 0.9% 266 mL infusion  4 mg/kg (Treatment Plan Recorded) Intravenous Once Senia Foster MD   Stopped at 02/05/24 1217    [COMPLETED] oxaliplatin (Eloxatin) 155 mg in dextrose 5% 281 mL infusion  75 mg/m2 (Treatment Plan Recorded) Intravenous Once Senia Foster MD   Stopped at 02/05/24 1432    [COMPLETED] leucovorin 850 mg in dextrose 5% 250 mL infusion  400 mg/m2 (Treatment Plan Recorded) Intravenous Once Senia Foster MD   Stopped at 02/05/24 1432    [COMPLETED] morphINE PF 4 MG/ML injection 4 mg  4 mg Intravenous Once Jose E Chung MD   4 mg at 01/29/24 0447    [COMPLETED] ondansetron (Zofran) 4 MG/2ML injection 4 mg  4 mg Intravenous Once Jose E Chung MD   4 mg at 01/29/24 0447    [COMPLETED] morphINE PF 4 MG/ML injection 4 mg  4 mg Intravenous Once Jose E Chung MD   4 mg at 01/29/24 0647    [COMPLETED] iopamidol 76% (ISOVUE-370) injection for power injector  75 mL Intravenous ONCE PRN Jose Roberto Myles DO   75 mL at 01/29/24 1148    [COMPLETED] ceFAZolin (Ancef) 2 g in 20mL IV syringe premix  2 g Intravenous On Call Vasquez Alexis MD   1 g at 01/25/24 1250    [COMPLETED] mupirocin (Bactroban) 2% nasal ointment             [COMPLETED] acetaminophen (Tylenol  Extra Strength) tab 1,000 mg  1,000 mg Oral Once PRN Fátima Gallagher MD        Or    [COMPLETED] HYDROcodone-acetaminophen (Norco) 5-325 MG per tab 1 tablet  1 tablet Oral Once PRN Fátima Gallagher MD   1 tablet at 24 1547    Or    [COMPLETED] HYDROcodone-acetaminophen (Norco) 5-325 MG per tab 2 tablet  2 tablet Oral Once PRN Fátima Gallagher MD        [COMPLETED] prochlorperazine (Compazine) 10 MG/2ML injection             [COMPLETED] heparin (Porcine) 100 Units/mL lock flush 500 Units  5 mL Intracatheter Once Vasquez Alexis MD   500 Units at 24 1605    [COMPLETED] pembrolizumab (Keytruda) 400 mg in sodium chloride 0.9% 116 mL IVPB  400 mg Intravenous Once Grecia Angelo APRN   Stopped at 24 1016    [COMPLETED] trastuzumab-qyyp (Trazimera) 336 mg in sodium chloride 0.9% 266 mL infusion  4 mg/kg (Treatment Plan Recorded) Intravenous Once Grecia Angelo APRN   Stopped at 24 1054    [COMPLETED] ondansetron (Zofran) 4 MG/2ML injection             [COMPLETED] HYDROmorphone (Dilaudid) 1 MG/ML injection             [COMPLETED] heparin (Porcine) 100 Units/mL lock flush 500 Units  5 mL Intracatheter Once Charles Maguire MD   500 Units at 24 1104    [COMPLETED] HYDROmorphone (Dilaudid) 1 MG/ML injection             [] naloxone (Narcan) 0.4 MG/ML injection 0.08 mg  0.08 mg Intravenous Once PRN Junaid Evangelista, DO        [COMPLETED] potassium chloride (K-Dur) tab 40 mEq  40 mEq Oral Once Jan Brito MD   40 mEq at 24 0833    [COMPLETED] morphINE PF 4 MG/ML injection 4 mg  4 mg Intravenous Once Jose E Chung MD   4 mg at 24    [] sodium chloride 0.9% infusion   Intravenous Continuous Jose E Chung MD   New Bag at 24 1002    [] morphINE PF 4 MG/ML injection 4 mg  4 mg Intravenous Q30 Min PRN Jose E Chung MD        [] ondansetron (Zofran) 4 MG/2ML injection 4 mg  4 mg Intravenous Q4H PRN Jose E Chung MD         [COMPLETED] potassium chloride 40 mEq in 250mL sodium chloride 0.9% IVPB premix  40 mEq Intravenous Once Jose E Chung MD 62.5 mL/hr at 24 40 mEq at 24    [COMPLETED] morphINE PF 4 MG/ML injection 4 mg  4 mg Intravenous Once Jose E Chung MD   4 mg at 24    [COMPLETED] HYDROcodone-acetaminophen (Norco) 5-325 MG per tab 1 tablet  1 tablet Oral Once Jose E Chung MD   1 tablet at 24 2253    [COMPLETED] heparin (Porcine) 100 Units/mL lock flush 500 Units  5 mL Intracatheter Once Senia Foster MD   500 Units at 24 1123    [COMPLETED] potassium chloride 40 mEq in 250mL sodium chloride 0.9% IVPB premix  40 mEq Intravenous Once Megan Arshad MD 62.5 mL/hr at 24 1436 40 mEq at 24 1436    [COMPLETED] iopamidol 76% (ISOVUE-370) injection for power injector  80 mL Intravenous ONCE PRN Sophia Gibson MD   80 mL at 23 0248    [COMPLETED] potassium chloride 40 mEq in 250mL sodium chloride 0.9% IVPB premix  40 mEq Intravenous Once Jose Roberto Myles DO 62.5 mL/hr at 23 1328 40 mEq at 23 1328    [COMPLETED] ipratropium-albuterol (Duoneb) 0.5-2.5 (3) MG/3ML inhalation solution 3 mL  3 mL Nebulization Once Konrad Cuba MD   3 mL at 23 1617    [COMPLETED] ketorolac (Toradol) 15 MG/ML injection 15 mg  15 mg Intravenous Once Konrad Cuba MD   15 mg at 23 1649    [] ondansetron (Zofran) 4 MG/2ML injection 4 mg  4 mg Intravenous Q4H PRN Konrad Cuba MD        [COMPLETED] piperacillin-tazobactam (Zosyn) 3.375 g in dextrose 5% 100 mL IVPB-ADDV  3.375 g Intravenous Once Konrad Cuba MD   Stopped at 23 175    [COMPLETED] ketorolac (Toradol) 15 MG/ML injection 15 mg  15 mg Intravenous Once Konrad Cuba MD   15 mg at 23 181    [COMPLETED] morphINE PF 4 MG/ML injection 4 mg  4 mg Intravenous Once Konrad Cuba MD   4 mg at 23     [COMPLETED] morphINE PF 4 MG/ML injection 4 mg  4 mg Intravenous Once Konrad Cuba MD   4 mg at 12/29/23 2153    [COMPLETED] trastuzumab-qyyp (Trazimera) 504 mg in sodium chloride 0.9% 274 mL infusion  6 mg/kg (Treatment Plan Recorded) Intravenous Once Artur, ARTEMIO Ingram   Stopped at 12/26/23 0930     Current Outpatient Medications on File Prior to Encounter   Medication Sig Dispense Refill    OLANZapine 5 MG Oral Tab Take 1 tablet (5 mg total) by mouth nightly. 30 tablet 3    morphINE ER 15 MG Oral Tab CR Take 1 tablet (15 mg total) by mouth every 12 (twelve) hours. 60 tablet 0    cyclobenzaprine 5 MG Oral Tab Take 1 tablet (5 mg total) by mouth 3 (three) times daily as needed for Muscle spasms. 30 tablet 0    metoclopramide (REGLAN) 10 MG Oral Tab Take 1 tablet (10 mg total) by mouth 4 (four) times daily before meals and nightly. 120 tablet 2    ondansetron (ZOFRAN) 8 MG tablet Take 1 tablet (8 mg total) by mouth every 8 (eight) hours as needed for Nausea. 30 tablet 3    HYDROcodone-acetaminophen 5-325 MG Oral Tab Take 1 tablet by mouth every 4 (four) hours as needed for Pain. 60 tablet 0    Pancrelipase, Lip-Prot-Amyl, (CREON) 80859-55148 units Oral Cap DR Particles Take 2 capsules with meals and 1 capsule with snacks 240 capsule 0    gabapentin 300 MG Oral Cap Take 1 capsule (300 mg total) by mouth in the morning and 1 capsule (300 mg total) before bedtime. 180 capsule 0    guaiFENesin-codeine 100-10 MG/5ML Oral Solution Take 5 mL by mouth every 6 (six) hours as needed for cough. 237 mL 1    benzonatate 100 MG Oral Cap Take 1 capsule (100 mg total) by mouth 3 (three) times daily as needed for cough. 90 capsule 0    sertraline 100 MG Oral Tab Take 2 tablets (200 mg total) by mouth daily.      losartan 50 MG Oral Tab Take 1 tablet (50 mg total) by mouth daily. 90 tablet 2    Omeprazole 40 MG Oral Capsule Delayed Release Take 1 capsule (40 mg total) by mouth 2 (two) times daily before meals. 90 capsule  3    metoprolol succinate ER 50 MG Oral Tablet 24 Hr TAKE 1 TABLET BY MOUTH EVERY DAY 90 tablet 1       Review of Systems:   A comprehensive review of systems was completed.    Pertinent positives and negatives noted in the HPI.    Objective:   Physical Exam:    /78 (BP Location: Left arm)   Pulse 59   Temp 97.8 °F (36.6 °C) (Axillary)   Resp 18   Ht 188 cm (6' 2\")   Wt 170 lb (77.1 kg)   SpO2 96%   BMI 21.83 kg/m²   General: No acute distress, Alert  Respiratory: No rhonchi, no wheezes  Cardiovascular: S1, S2. Regular rate and rhythm  Abdomen: Soft, NT/ND, +BS  Neuro: No new focal deficits  Extremities: No edema    Results:    Labs:      Labs Last 24 Hours:  Recent Labs   Lab 03/14/24  1800 03/15/24  0643   WBC 3.3* 4.0   HGB 11.1* 10.6*   MCV 89.7 90.5   .0 140.0*   INR  --  1.21*       Recent Labs   Lab 03/14/24  1801 03/15/24  0643   * 100*   BUN 13 12   CREATSERUM 0.52* 0.48*   CA 9.1 9.2   ALB 3.1* 2.8*   * 135*   K 4.0 3.8    104   CO2 27.0 27.0   ALKPHO 92 82   AST 29 27   ALT 31 24   BILT 0.4 0.4   TP 7.2 6.5       Recent Labs   Lab 03/15/24  0643   PTP 15.3*   INR 1.21*       No results for input(s): \"TROP\", \"CK\" in the last 168 hours.      Imaging: Imaging data reviewed in Epic.    Assessment & Plan:      ##H/o bronchoesophageal fistula s/p EGD with foreign body retrieval and fistula closure with septal occluder device  -Management per GI  -Bronchoscopic assistance provided by Dr. Madrid     #Metastatic esophageal cancer s/p gastroesophagectomy 9/22, metastatic pancreatic cancer   -Follows with Dr. Foster    #Normocytic anemia/thrombocytopenia  -Monitor    #Hyponatremia  -Monitor     #Chronic pain  -Continue home pain regimen      #C. Diff history      #CAD with prior CABG  -Continue home meds if tolerating      #GERD  -PPI    Plan of care discussed with patient and RN.     Quinton Concepcion DO  3/20/2024    The 21st Century Cures Act makes medical notes like these  available to patients in the interest of transparency. Please be advised this is a medical document. Medical documents are intended to carry relevant information, facts as evident, and the clinical opinion of the practitioner. The medical note is intended as peer to peer communication and may appear blunt or direct. It is written in medical language and may contain abbreviations or verbiage that are unfamiliar.

## 2024-03-20 NOTE — ANESTHESIA POSTPROCEDURE EVALUATION
[facility]    Dontae Cortez  Patient Status:  Hospital Outpatient Surgery   Age/Gender 53 year old male MRN [id number]   Location [facility] Attending Raheel Ritchie MD   Hosp Day # 0 PCP Adrian Hroowitz MD       Anesthesia Post-op Note    ESOPHAGOGASTRODUODENOSCOPY (EGD) W/ STENT REMOVAL AND AMPLATZER STENT PLACEMENT / BRONCHOSCOPY    Procedure Summary       Date: 03/20/24 Room / Location:  ENDOSCOPY 02 /  ENDOSCOPY    Anesthesia Start: 0721 Anesthesia Stop: 0904    Procedures:       ESOPHAGOGASTRODUODENOSCOPY (EGD) W/ STENT REMOVAL AND AMPLATZER STENT PLACEMENT / BRONCHOSCOPY      BRONCHOSCOPY Diagnosis:       Malignant neoplasm of esophagus, unspecified location (HCC)      Nausea and vomiting, unspecified vomiting type      Esophageal dysphagia      (BRONCHOESOPHAGEAL FISTULA)    Surgeons: Raheel Ritchie MD; Chapo Madrid MD Anesthesiologist: El Oquendo MD    Anesthesia Type: general ASA Status: 3            Anesthesia Type: general    Vitals Value Taken Time   /82 03/20/24 0905   Temp 98 °F (36.7 °C) 03/20/24 0905   Pulse 90 03/20/24 0905   Resp 16 03/20/24 0905   SpO2 96 % 03/20/24 0905       Patient Location: PACU    Anesthesia Type: general    Airway Patency: patent    Postop Pain Control: adequate    Mental Status: mildly sedated but able to meaningfully participate in the post-anesthesia evaluation    Nausea/Vomiting: none    Cardiopulmonary/Hydration status: stable euvolemic    Complications: no apparent anesthesia related complications    Postop vital signs: stable    Dental Exam: Unchanged from Preop    Patient to be discharged from PACU when criteria met.

## 2024-03-20 NOTE — PROGRESS NOTES
NURSING ADMISSION NOTE      Patient admitted via Cart from PACU  Oriented to room.  Safety precautions initiated.  Bed in low position.  Call light in reach.    Alert & oriented x4.With tolerable pain.No nausea or vomiting.Aspiration precautions observed.Assessment done Plan  of care discussed with patient.All questions and concerns addressed.

## 2024-03-20 NOTE — PLAN OF CARE
Problem: PAIN - ADULT  Goal: Verbalizes/displays adequate comfort level or patient's stated pain goal  Description: INTERVENTIONS:  - Encourage pt to monitor pain and request assistance  - Assess pain using appropriate pain scale  - Administer analgesics based on type and severity of pain and evaluate response  - Implement non-pharmacological measures as appropriate and evaluate response  - Consider cultural and social influences on pain and pain management  - Manage/alleviate anxiety  - Utilize distraction and/or relaxation techniques  - Monitor for opioid side effects  - Notify MD/LIP if interventions unsuccessful or patient reports new pain  - Anticipate increased pain with activity and pre-medicate as appropriate  Outcome: Progressing     Problem: SAFETY ADULT - FALL  Goal: Free from fall injury  Description: INTERVENTIONS:  - Assess pt frequently for physical needs  - Identify cognitive and physical deficits and behaviors that affect risk of falls.  - [place] fall precautions as indicated by assessment.  - Educate pt/family on patient safety including physical limitations  - Instruct pt to call for assistance with activity based on assessment  - Modify environment to reduce risk of injury  - Provide assistive devices as appropriate  - Consider OT/PT consult to assist with strengthening/mobility  - Encourage toileting schedule  Outcome: Progressing     Problem: Patient/Family Goals  Goal: Patient/Family Long Term Goal  Description: Patient's Long Term Goal: go home    Interventions:  - Iv fluids  Clear liquids  -aspiration precautions  -pain management    - See additional Care Plan goals for specific interventions  Outcome: Not Progressing  Goal: Patient/Family Short Term Goal  Description: Patient's Short Term Goal: have tolerable pain    Interventions:   - give pain meds as needed  - See additional Care Plan goals for specific interventions  Outcome: Progressing     Problem: GASTROINTESTINAL - ADULT  Goal:  Minimal or absence of nausea and vomiting  Description: INTERVENTIONS:  - Maintain adequate hydration with IV or PO as ordered and tolerated  - Nasogastric tube to low intermittent suction as ordered  - Evaluate effectiveness of ordered antiemetic medications  - Provide nonpharmacologic comfort measures as appropriate  - Advance diet as tolerated, if ordered  - Obtain nutritional consult as needed  - Evaluate fluid balance  Outcome: Progressing  Goal: Maintains or returns to baseline bowel function  Description: INTERVENTIONS:  - Assess bowel function  - Maintain adequate hydration with IV or PO as ordered and tolerated  - Evaluate effectiveness of GI medications  - Encourage mobilization and activity  - Obtain nutritional consult as needed  - Establish a toileting routine/schedule  - Consider collaborating with pharmacy to review patient's medication profile  Outcome: Progressing

## 2024-03-20 NOTE — OPERATIVE REPORT
[facility]    Dontae Cortez Jr. Patient Status:  Outpatient in a Bed    10/15/1970 MRN [id number]   Location [facility] POST ANESTHESIA CARE UNIT Attending Raheel Ritchie MD   Hosp Day # 0 PCP Adrian Horowitz MD     OPERATIVE REPORT:     DATE OF OPERATION: 24     PREOPERATIVE DIAGNOSIS(ES): bronchoesophageal fistula     POSTOPERATIVE DIAGNOSIS(ES): same     OPERATION(S) PERFORMED: Bronchoscopy with bronchoscopic assistance for placement of ASD occluder device     SURGEON: Chapo Madrid MD    ANESTHESIA: General. Please see separate flow sheet.      EQUIPMENT:   Olympus adult videobronchoscope.  Standard biopsy forceps    INDICATION: The patient is a 53 year old, who was found to have bronchoesophageal fistula with previous stenting and most recently ASD occluder device in January but he continued to have symptoms. The procedure is being performed to assist with placement of ASD occluder device with Dr. Ritchie. Differential diagnosis, risks, benefits and alternatives were discussed at length.     CONSENT:  Risks and benefits were reviewed with the patient in detail regarding the procedure as well as anesthesia prior to the procedure. All questions were answered, and the patient was agreeable.     PROCEDURE:  After informed consent was obtained, the patient was brought to the bronchoscopy suite.  Time out performed.  Patient was placed in a supine position, anesthesia administered, and topical lidocaine given for local anesthesia.     First, the videobronchoscope was used and inserted via endotracheal tube. The bronchoscope was then advanced into the trachea. Evaluation of the trachea and left sided airways demonstrated normal endobronchial examination to the subsegmental level.   Upon evaluation of the right lung, there was thick purulent secretions noted emanating from the right main stem bronchus and throughout the bronchus intermedius, right middle and lower lobe bronchi. The  secretions were cleared with suction revealing a bronchoesophageal fistula with a portion of the esophageal stent viewed through the defect. The defect measured approximately 7-8mm. The right upper lobe bronchus and segmental airways were patent and normal.     Bronchoscopic assistance was provided for the remainder of the case to provide endobronchial view to assist Dr. Ritchie with placement of the ASD occluder device which was performed successfully - see his note for details.  Towards the completion of the ASD device placement, the pulmonary forceps were used to aid in positioning to ensure the distal lip of the bronchoesophageal fistula was covered at the proximal bronchus intermedius. The device did cause mild obstruction of the right main stem (approximately 40-50%) but the airway was otherwise patent.  A bronchoalveolar lavage was then performed in the right lower lobe anterior segment with the instillation of 60 ml sterile saline and return of 15 ml which was sent for microbiological studies.   There was no evidence of bleeding and the bronchoscope was then withdrawn. The patient tolerated the procedure well without immediate complications.    EBL:  <5ml     IMPRESSION:   Bronchoesophageal fistula     PLAN:   Monitor for persistent symptoms  Will need to follow up as outpatient with repeat bronchoscopy for airway evaluation in 1-2months    Chapo Madrid MD

## 2024-03-20 NOTE — ANESTHESIA PREPROCEDURE EVALUATION
PRE-OP EVALUATION    Patient Name: Dontae Cortez Jr.    Admit Diagnosis: Malignant neoplasm of esophagus, unspecified location (HCC) [C15.9]  Nausea and vomiting, unspecified vomiting type [R11.2]  Esophageal dysphagia [R13.19]    Pre-op Diagnosis: Malignant neoplasm of esophagus, unspecified location (HCC) [C15.9]  Nausea and vomiting, unspecified vomiting type [R11.2]  Esophageal dysphagia [R13.19]    ESOPHAGOGASTRODUODENOSCOPY (EGD) with Amplatzer Stent, BRONCHOSCOPY    Anesthesia Procedure: ESOPHAGOGASTRODUODENOSCOPY (EGD) with Amplatzer Stent, BRONCHOSCOPY  BRONCHOSCOPY    Surgeon(s) and Role:  Panel 1:     * Raheel Ritchie MD - Primary  Panel 2:     * Chapo Madrid MD - Primary    Pre-op vitals reviewed.  Temp: 97.8 °F (36.6 °C)  Pulse: 58  Resp: 18  BP: 149/87  SpO2: 95 %  Body mass index is 21.83 kg/m².    Current medications reviewed.  Hospital Medications:   lactated ringers infusion   Intravenous Continuous       Outpatient Medications:     Medications Prior to Admission   Medication Sig Dispense Refill Last Dose    OLANZapine 5 MG Oral Tab Take 1 tablet (5 mg total) by mouth nightly. 30 tablet 3 3/19/2024    morphINE ER 15 MG Oral Tab CR Take 1 tablet (15 mg total) by mouth every 12 (twelve) hours. 60 tablet 0 3/19/2024    cyclobenzaprine 5 MG Oral Tab Take 1 tablet (5 mg total) by mouth 3 (three) times daily as needed for Muscle spasms. 30 tablet 0 3/19/2024    metoclopramide (REGLAN) 10 MG Oral Tab Take 1 tablet (10 mg total) by mouth 4 (four) times daily before meals and nightly. 120 tablet 2 3/19/2024    ondansetron (ZOFRAN) 8 MG tablet Take 1 tablet (8 mg total) by mouth every 8 (eight) hours as needed for Nausea. 30 tablet 3 3/19/2024    HYDROcodone-acetaminophen 5-325 MG Oral Tab Take 1 tablet by mouth every 4 (four) hours as needed for Pain. 60 tablet 0 3/19/2024    Pancrelipase, Lip-Prot-Amyl, (CREON) 77442-05506 units Oral Cap DR Particles Take 2 capsules with meals and 1 capsule  with snacks 240 capsule 0 Past Week    gabapentin 300 MG Oral Cap Take 1 capsule (300 mg total) by mouth in the morning and 1 capsule (300 mg total) before bedtime. 180 capsule 0 3/19/2024    guaiFENesin-codeine 100-10 MG/5ML Oral Solution Take 5 mL by mouth every 6 (six) hours as needed for cough. 237 mL 1 3/19/2024    benzonatate 100 MG Oral Cap Take 1 capsule (100 mg total) by mouth 3 (three) times daily as needed for cough. 90 capsule 0 3/19/2024    sertraline 100 MG Oral Tab Take 2 tablets (200 mg total) by mouth daily.   3/19/2024    losartan 50 MG Oral Tab Take 1 tablet (50 mg total) by mouth daily. 90 tablet 2 3/19/2024    Omeprazole 40 MG Oral Capsule Delayed Release Take 1 capsule (40 mg total) by mouth 2 (two) times daily before meals. 90 capsule 3 3/19/2024    metoprolol succinate ER 50 MG Oral Tablet 24 Hr TAKE 1 TABLET BY MOUTH EVERY DAY 90 tablet 1 3/19/2024       Allergies: Patient has no known allergies.      Anesthesia Evaluation    Patient summary reviewed.    Anesthetic Complications           GI/Hepatic/Renal      (+) GERD          (+) liver disease                 Cardiovascular                  (+) hypertension     (+) CAD                                Endo/Other                                  Pulmonary               (+) shortness of breath            Neuro/Psych      (+) depression                                Past Surgical History:   Procedure Laterality Date    APPENDECTOMY      APPENDECTOMY      ARTHROSCOPY OF JOINT UNLISTED      right shoulder    CABG      On 8/16/13: CABG x 4 with LIMA to LAD and SVG to diagonal, OM and PDA    CARDIAC CATH LAB      On 8/14/2013: cardiac cath showed 3-vessel disease    OTHER SURGICAL HISTORY      1.       Laparoscopic robotic-assisted esophagogastrectomy.    PORT, INDWELLING, IMP       Social History     Socioeconomic History    Marital status:     Number of children: 3   Occupational History    Occupation: works as  - on  workman's comp   Tobacco Use    Smoking status: Former     Packs/day: 1.00     Years: 27.00     Additional pack years: 0.00     Total pack years: 27.00     Types: Cigarettes     Quit date: 8/15/2013     Years since quitting: 10.6    Smokeless tobacco: Never    Tobacco comments:     Quit smoking 2013   Vaping Use    Vaping Use: Never used   Substance and Sexual Activity    Alcohol use: Not Currently    Drug use: Never    Sexual activity: Not Currently     Partners: Female   Other Topics Concern    Caffeine Concern Yes    Stress Concern No    Weight Concern No    Special Diet No    Exercise No    Seat Belt No     History   Drug Use Unknown     Available pre-op labs reviewed.  Lab Results   Component Value Date    WBC 4.0 03/15/2024    RBC 3.59 (L) 03/15/2024    HGB 10.6 (L) 03/15/2024    HCT 32.5 (L) 03/15/2024    MCV 90.5 03/15/2024    MCH 29.5 03/15/2024    MCHC 32.6 03/15/2024    RDW 15.2 03/15/2024    .0 (L) 03/15/2024     Lab Results   Component Value Date     (L) 03/15/2024    K 3.8 03/15/2024     03/15/2024    CO2 27.0 03/15/2024    BUN 12 03/15/2024    CREATSERUM 0.48 (L) 03/15/2024     (H) 03/15/2024    CA 9.2 03/15/2024     Lab Results   Component Value Date    INR 1.21 (H) 03/15/2024         Airway      Mallampati: III  Mouth opening: <3 FB  TM distance: < 4 cm  Neck ROM: full Cardiovascular    Cardiovascular exam normal.  Rhythm: regular  Rate: normal     Dental             Pulmonary    Pulmonary exam normal.                 Other findings              ASA: 3   Plan: general  NPO status verified and patient meets guidelines.          Plan/risks discussed with: patient and spouse                Present on Admission:  **None**

## 2024-03-21 ENCOUNTER — PATIENT OUTREACH (OUTPATIENT)
Dept: CASE MANAGEMENT | Age: 54
End: 2024-03-21

## 2024-03-21 VITALS
SYSTOLIC BLOOD PRESSURE: 126 MMHG | BODY MASS INDEX: 21.82 KG/M2 | DIASTOLIC BLOOD PRESSURE: 72 MMHG | TEMPERATURE: 98 F | HEART RATE: 52 BPM | HEIGHT: 74 IN | OXYGEN SATURATION: 92 % | WEIGHT: 170 LBS | RESPIRATION RATE: 16 BRPM

## 2024-03-21 RX ORDER — BENZONATATE 100 MG/1
100 CAPSULE ORAL 3 TIMES DAILY PRN
Status: DISCONTINUED | OUTPATIENT
Start: 2024-03-21 | End: 2024-03-21

## 2024-03-21 NOTE — PROGRESS NOTES
NURSING DISCHARGE NOTE    Discharged Home via Ambulatory.  Accompanied by RN.  Belongings Taken by patient/family.

## 2024-03-21 NOTE — PROGRESS NOTES
A&Ox4. VSS. RA. .  GI: Abdomen soft, nondistended.   Denies nausea.  : Voids.  Pain controlled with PRN pain medications  Up ad tito.  Diet:tolerating full liquids  IV saline locked.  All appropriate safety measures in place. All questions and concerns addressed. Will continue to monitor

## 2024-03-21 NOTE — PROGRESS NOTES
Alert and oriented x 4, VSS, denies chest pain and shortness of breath. Saturating on room air. Pain managed with PRNs per MAR. Denies nausea, abdomen soft and nontender, tolerating full liquid diet. Voiding without difficulty. Up with stand by assist due to recent fall at home. IV fluid TKO to left subclavian portacath. Plan of care discussed with patient. All questions addressed.

## 2024-03-25 ENCOUNTER — OFFICE VISIT (OUTPATIENT)
Dept: HEMATOLOGY/ONCOLOGY | Age: 54
End: 2024-03-25
Attending: INTERNAL MEDICINE
Payer: COMMERCIAL

## 2024-03-25 ENCOUNTER — TELEPHONE (OUTPATIENT)
Dept: FAMILY MEDICINE CLINIC | Facility: CLINIC | Age: 54
End: 2024-03-25

## 2024-03-25 VITALS
TEMPERATURE: 99 F | DIASTOLIC BLOOD PRESSURE: 93 MMHG | SYSTOLIC BLOOD PRESSURE: 160 MMHG | WEIGHT: 173.5 LBS | BODY MASS INDEX: 22.27 KG/M2 | HEIGHT: 73.82 IN | RESPIRATION RATE: 18 BRPM | HEART RATE: 73 BPM | OXYGEN SATURATION: 96 %

## 2024-03-25 DIAGNOSIS — C15.5 MALIGNANT NEOPLASM OF LOWER THIRD OF ESOPHAGUS (HCC): Primary | ICD-10-CM

## 2024-03-25 DIAGNOSIS — C78.89 METASTATIC ADENOCARCINOMA TO PANCREAS (HCC): ICD-10-CM

## 2024-03-25 DIAGNOSIS — C78.7 METASTASES TO THE LIVER (HCC): ICD-10-CM

## 2024-03-25 DIAGNOSIS — Z51.81 ENCOUNTER FOR MONITORING CARDIOTOXIC DRUG THERAPY: ICD-10-CM

## 2024-03-25 DIAGNOSIS — Z79.899 ENCOUNTER FOR MONITORING CARDIOTOXIC DRUG THERAPY: ICD-10-CM

## 2024-03-25 LAB
ALBUMIN SERPL-MCNC: 2.8 G/DL (ref 3.4–5)
ALBUMIN/GLOB SERPL: 0.6 {RATIO} (ref 1–2)
ALP LIVER SERPL-CCNC: 103 U/L
ALT SERPL-CCNC: 21 U/L
ANION GAP SERPL CALC-SCNC: 5 MMOL/L (ref 0–18)
AST SERPL-CCNC: 18 U/L (ref 15–37)
BASOPHILS # BLD AUTO: 0.02 X10(3) UL (ref 0–0.2)
BASOPHILS NFR BLD AUTO: 0.3 %
BILIRUB SERPL-MCNC: 0.4 MG/DL (ref 0.1–2)
BUN BLD-MCNC: 14 MG/DL (ref 9–23)
CALCIUM BLD-MCNC: 8.6 MG/DL (ref 8.5–10.1)
CANCER AG19-9 SERPL-ACNC: 535.8 U/ML (ref ?–37)
CHLORIDE SERPL-SCNC: 105 MMOL/L (ref 98–112)
CO2 SERPL-SCNC: 25 MMOL/L (ref 21–32)
CREAT BLD-MCNC: 0.81 MG/DL
EGFRCR SERPLBLD CKD-EPI 2021: 105 ML/MIN/1.73M2 (ref 60–?)
EOSINOPHIL # BLD AUTO: 0.08 X10(3) UL (ref 0–0.7)
EOSINOPHIL NFR BLD AUTO: 1.1 %
ERYTHROCYTE [DISTWIDTH] IN BLOOD BY AUTOMATED COUNT: 15.2 %
GLOBULIN PLAS-MCNC: 4.5 G/DL (ref 2.8–4.4)
GLUCOSE BLD-MCNC: 191 MG/DL (ref 70–99)
HCT VFR BLD AUTO: 35.3 %
HGB BLD-MCNC: 11.1 G/DL
IMM GRANULOCYTES # BLD AUTO: 0.13 X10(3) UL (ref 0–1)
IMM GRANULOCYTES NFR BLD: 1.7 %
LYMPHOCYTES # BLD AUTO: 0.77 X10(3) UL (ref 1–4)
LYMPHOCYTES NFR BLD AUTO: 10.1 %
MCH RBC QN AUTO: 29.2 PG (ref 26–34)
MCHC RBC AUTO-ENTMCNC: 31.4 G/DL (ref 31–37)
MCV RBC AUTO: 92.9 FL
MONOCYTES # BLD AUTO: 0.33 X10(3) UL (ref 0.1–1)
MONOCYTES NFR BLD AUTO: 4.3 %
NEUTROPHILS # BLD AUTO: 6.26 X10 (3) UL (ref 1.5–7.7)
NEUTROPHILS # BLD AUTO: 6.26 X10(3) UL (ref 1.5–7.7)
NEUTROPHILS NFR BLD AUTO: 82.5 %
OSMOLALITY SERPL CALC.SUM OF ELEC: 286 MOSM/KG (ref 275–295)
PLATELET # BLD AUTO: 180 10(3)UL (ref 150–450)
POTASSIUM SERPL-SCNC: 4.1 MMOL/L (ref 3.5–5.1)
PROT SERPL-MCNC: 7.3 G/DL (ref 6.4–8.2)
RBC # BLD AUTO: 3.8 X10(6)UL
SODIUM SERPL-SCNC: 135 MMOL/L (ref 136–145)
WBC # BLD AUTO: 7.6 X10(3) UL (ref 4–11)

## 2024-03-25 PROCEDURE — 99215 OFFICE O/P EST HI 40 MIN: CPT | Performed by: NURSE PRACTITIONER

## 2024-03-25 PROCEDURE — 96413 CHEMO IV INFUSION 1 HR: CPT

## 2024-03-25 PROCEDURE — 96375 TX/PRO/DX INJ NEW DRUG ADDON: CPT

## 2024-03-25 PROCEDURE — S0028 INJECTION, FAMOTIDINE, 20 MG: HCPCS | Performed by: NURSE PRACTITIONER

## 2024-03-25 PROCEDURE — 96417 CHEMO IV INFUS EACH ADDL SEQ: CPT

## 2024-03-25 PROCEDURE — 85025 COMPLETE CBC W/AUTO DIFF WBC: CPT

## 2024-03-25 PROCEDURE — 80053 COMPREHEN METABOLIC PANEL: CPT

## 2024-03-25 PROCEDURE — 96368 THER/DIAG CONCURRENT INF: CPT

## 2024-03-25 PROCEDURE — 86301 IMMUNOASSAY TUMOR CA 19-9: CPT

## 2024-03-25 RX ORDER — FLUOROURACIL 50 MG/ML
2400 INJECTION, SOLUTION INTRAVENOUS CONTINUOUS
Status: CANCELLED | OUTPATIENT
Start: 2024-03-25

## 2024-03-25 RX ORDER — FLUOROURACIL 50 MG/ML
2400 INJECTION, SOLUTION INTRAVENOUS CONTINUOUS
Status: DISCONTINUED | OUTPATIENT
Start: 2024-03-25 | End: 2024-03-25

## 2024-03-25 RX ORDER — LORATADINE 10 MG/1
10 TABLET ORAL DAILY
Status: DISCONTINUED | OUTPATIENT
Start: 2024-03-25 | End: 2024-03-25

## 2024-03-25 RX ORDER — METHYLPREDNISOLONE SODIUM SUCCINATE 125 MG/2ML
125 INJECTION, POWDER, LYOPHILIZED, FOR SOLUTION INTRAMUSCULAR; INTRAVENOUS ONCE
Status: COMPLETED | OUTPATIENT
Start: 2024-03-25 | End: 2024-03-25

## 2024-03-25 RX ORDER — LORATADINE 10 MG/1
10 TABLET ORAL DAILY
Status: CANCELLED
Start: 2024-03-25

## 2024-03-25 RX ORDER — FAMOTIDINE 10 MG/ML
20 INJECTION, SOLUTION INTRAVENOUS ONCE
Status: COMPLETED | OUTPATIENT
Start: 2024-03-25 | End: 2024-03-25

## 2024-03-25 RX ORDER — LOPERAMIDE HYDROCHLORIDE 2 MG/1
4 CAPSULE ORAL ONCE
Status: COMPLETED | OUTPATIENT
Start: 2024-03-25 | End: 2024-03-25

## 2024-03-25 RX ADMIN — FAMOTIDINE 20 MG: 10 INJECTION, SOLUTION INTRAVENOUS at 13:30:00

## 2024-03-25 RX ADMIN — FLUOROURACIL 5000 MG: 50 INJECTION, SOLUTION INTRAVENOUS at 14:05:00

## 2024-03-25 RX ADMIN — LOPERAMIDE HYDROCHLORIDE 4 MG: 2 CAPSULE ORAL at 13:35:00

## 2024-03-25 RX ADMIN — METHYLPREDNISOLONE SODIUM SUCCINATE 125 MG: 125 INJECTION, POWDER, LYOPHILIZED, FOR SOLUTION INTRAMUSCULAR; INTRAVENOUS at 13:28:00

## 2024-03-25 RX ADMIN — LORATADINE 10 MG: 10 TABLET ORAL at 12:14:00

## 2024-03-25 NOTE — PROGRESS NOTES
Pt here for C18D1 Drug(s)folfox, trazimera, opdivo.  Arrives Ambulating independently, accompanied by Self     Patient was evaluated today by LD and Treatment Nurse.    Oral medications included in this regimen:  no    Patient confirms comprehension of cancer treatment schedule:  yes    Pregnancy screening:  Not applicable    Modifications in dose or schedule:  No    Medications appearance and physical integrity checked by RN: yes.    Chemotherapy IV pump settings verified by 2 RNs:  Yes.  Frequency of blood return and site check throughout administration: Prior to administration     Infusion/treatment outcome:  hypersensitivity protocol initiated - see documentation.  Pt c/o itching and hives during oxali administration. Oxali stopped. Not rechallenged. Symptoms alleviated  and pt dc'd home.     Education Record    Learner:  Patient  Barriers / Limitations:  None  Method:  Brief focused  Education / instructions given:  chemo admin  Outcome:  Shows understanding    Discharged Home, Ambulating independently, accompanied by:Self    Patient/family verbalized understanding of future appointments: by [employer] messaging

## 2024-03-25 NOTE — TELEPHONE ENCOUNTER
Problems   The patient or proxy has not reviewed this information.     Medications   The patient or proxy has not reviewed this information, and there are updates pending:   Requested Medication Removals Start Date Reported By  Comments   ondansetron 8 MG tablet 3/4/2024 Ronen Cortez Jr.    cyclobenzaprine 5 MG Tabs 3/12/2024 Ronen Cortez Jr.    guaiFENesin-codeine 100-10 MG/5ML Soln 2/19/2024 Ronen Cortez Jr.      Allergies   The patient or proxy has not reviewed this information.

## 2024-03-25 NOTE — PROGRESS NOTES
ANP Infusion Reaction Note    Patient Name: Dontae Cortez Jr.   YOB: 1970   Medical Record Number: [id number]   CSN: [id number]   Date of visit: 3/25/2024       Chief Complaint:  Infusion reaction    History of Present Illness:  Dontae Cortez Jr. is a 53 year old patient of Dr. Foster. About 90 min into oxaliplatin infusion, pt developed sudden onset of flushing, neck itching, and erythema of the hands. He also had 1 single hive of the L flank.  Infusion was interrupted and APN called to chair side.     Denies difficulty swallowing, dyspnea, chest pain/tightness, itching/rashes, or N/V. No facial or oral swelling appreciated. Vital signs stable.    Allergies:  No Known Allergies    Medications:    Current Outpatient Medications:     OLANZapine 5 MG Oral Tab, Take 1 tablet (5 mg total) by mouth nightly., Disp: 30 tablet, Rfl: 3    morphINE ER 15 MG Oral Tab CR, Take 1 tablet (15 mg total) by mouth every 12 (twelve) hours., Disp: 60 tablet, Rfl: 0    metoclopramide (REGLAN) 10 MG Oral Tab, Take 1 tablet (10 mg total) by mouth 4 (four) times daily before meals and nightly., Disp: 120 tablet, Rfl: 2    HYDROcodone-acetaminophen 5-325 MG Oral Tab, Take 1 tablet by mouth every 4 (four) hours as needed for Pain., Disp: 60 tablet, Rfl: 0    Pancrelipase, Lip-Prot-Amyl, (CREON) 03132-57522 units Oral Cap DR Particles, Take 2 capsules with meals and 1 capsule with snacks, Disp: 240 capsule, Rfl: 0    gabapentin 300 MG Oral Cap, Take 1 capsule (300 mg total) by mouth in the morning and 1 capsule (300 mg total) before bedtime., Disp: 180 capsule, Rfl: 0    benzonatate 100 MG Oral Cap, Take 1 capsule (100 mg total) by mouth 3 (three) times daily as needed for cough., Disp: 90 capsule, Rfl: 0    sertraline 100 MG Oral Tab, Take 2 tablets (200 mg total) by mouth daily., Disp: , Rfl:     losartan 50 MG Oral Tab, Take 1 tablet (50 mg total) by mouth daily., Disp: 90 tablet, Rfl: 2    Omeprazole 40 MG Oral  Capsule Delayed Release, Take 1 capsule (40 mg total) by mouth 2 (two) times daily before meals., Disp: 90 capsule, Rfl: 3    metoprolol succinate ER 50 MG Oral Tablet 24 Hr, TAKE 1 TABLET BY MOUTH EVERY DAY, Disp: 90 tablet, Rfl: 1    Review of Systems:   As in HPI    Vital Signs:  See flowsheets    Physical Exam  General: Awake, alert, oriented x3, no acute distress.    HEENT:  No periorbital, oropharyngeal, lip/tongue swelling. flushing of the face  Neck:  Supple, no swelling, erythema of the neck  Lungs:  respirations unlabored  CV:  Regular rate and rhythm  Abdomen:  1 hive noted on L flank  Extremities:  No edema, no tenderness, bright erythema of the bilateral hands noted  Neuro:  Grossly intact    Impression/Plan:    Infusion reaction: Grade 1-2 due to oxaliplatin. As he has already received the majority of the dose, will not resume infusion today. Solumedrol + famotidine administered. Dr Foster will be notified. If oxaliplatin is to be rechallenged in the future, will need premedication.     Electronically Signed by:    Grecia Angelo, ROGER, NP-C  Nurse Practitioner  [facility]

## 2024-03-25 NOTE — PROGRESS NOTES
Here for APN visit and chemotherapy   Recently hospitalized 3/14-3/16 and 3/20-3/21   Had occluder device placed   Some right hip, lower back pain  Taking Morphine ER and [place] as needed for pain.   Neuropathy unchanged  Education Record     Learner:  Patient     Disease / Diagnosis:      Barriers / Limitations:  none                Comments:     Method:  Discussion                Comments:     General Topics:  Plan of care reviewed                Comments:     Outcome:  Shows understanding                Comments:

## 2024-03-25 NOTE — PROGRESS NOTES
Cancer Center Progress Note    Patient Name: Dontae Cortez Jr.   YOB: 1970   Medical Record Number: [id number]   Date of visit: 3/25/2024    Chief Complaint/Reason for Visit:  Chief Complaint   Patient presents with    Follow - Up    Chemotherapy      History of Present Illness: Ronen presents today for follow up and chemotherapy. He has metastatic esophageal cancer and his medical oncologist is Dr. Senia Foster. Patient is receiving chemotherapy and immunotherapy with FOLFOX, trastuzumab and nivolumab. Patient was recently hospitalized 3/14/2024-3/16/2024 and 3/20/2024-3/21/2024 for nausea/vomiting with malpositioned ASD occluder device. On 3/20/2024 he had a bronchoscopy with lavage and occluder device placement in the bronchial tree.     Today, patient reports improvement in coughing and mucus production/build up. He has had ongoing right sided lower back/hip pain that has been bothering him more lately. He reports neuropathy is unchanged. Throat pain related to cancer is controlled with Norco PRN. He denies recent fever or chills.     Diagnosis:   Metastatic Esophageal Cancer. Possible 2nd pancreatic primary?  -Initially cT3 N1 Esophageal Adenocarcinoma (stage IIIB)  -HER2 amplified by FISH     Treatment History:   1. Neoadjuvant chemoRT with carbo-taxol: 7/6/22-8/10/22     2. laparoscopic robotic-assisted esophagogastrectomy with feeding jejunostomy tube placement on 9/30/2022.      3. Adjuvant Opdivo: 1/9/23-2/20/23     ---Metastatic disease developed-----     5. Chemo-Herceptin-Immunotherapy: 5/1/23-09/2023 --> Switched to Herceptin/Immunotherapy maintenance on 9/25/23 due to negative signatera. FOLFOX and herceptin restarted on 2/5/24 due to progression        Oncology History   -2018: Per report had a normal EGD and colonoscopy     -June 2022: He met with GI due to new onset dysphagia which started about 1 month prior to his visit.  He had an esophagram with a focal abrupt narrowing  with irregular margins in the distal one third of the esophagus extending to the GE junction.  He also had an episode of food impaction. He has also lost about 15 lb. He was a heavy smoker from age 14 to about 40 (1.5 PPD). Also with alcohol use currently. Mother with a history of breast and colon cancer.      -EGD and EUS with Dr. Yadav on 6/7/2022: Severe esophagitis with a concerning mass extending 37-39 centimeters from the incisors. Nonobstructive mass.  By EUS uT3N1 disease.  Pathology showed invasive moderate to poorly differentiated adenocarcinoma.  HER2 amplified by FISH     -PET/CT on 6/20/2022: Increased distal esophageal uptake with an SUV of 14.4.  Concern for shreya metastases.     -Concurrent chemoradiation with carboplatin AUC2 plus paclitaxel 50 mg/m2: 7/6/22-8/10/22:  went from 137 to 30.9.      -PET/CT on 9/6/22: showed overall improvement     -Surgery with Dr. Lu on 9/30/22: ypT1b pN0. Recovery has been complicated by an esophageal leak     -Met with Dr. Foster on 12/12/22. We discussed adjuvant opdivo for 1 year. His  was elevated to 48 and repeat was 64. CT CAP was recommended.      -He was admitted on 12/25/22 for abdominal pain. He had a POEM procedure done. He was also noted to have a RLL consolidation. He was seen by pulmonary, based on imaging an outpatient PET/CT was recommended and a biopsy of the most FDG avid lesion would be considered. Noted to have CAD and an outpatient evaluation was recommended.      -PET/CT on 1/4/23-imaging reviewed in tumor board and no focal areas of concern and recommendation was to start immunotherapy.      -Adjuvant Opdivo: 1/19/2023-3/6/23. During this time, his tumor marker was rising but imaging was negative. Imaging from 4/10/23 was concerning a pancreatic head mass. HE underwent and EGD on 4/7/232 with dr. Hinson. There was a pancreatic head mass positive for adenocarcinoma.Comparison to previous esophageal cancer shows similarity but  IHC in non-specific. Her 2 IHC was 2+ on this specimen but FISH was negative. Due to these findings, treatment for metastatic esophageal cancer was considered and he was started on FOLFOX + Herceptin + IO.      FOLFOX + Herceptin + Immunotherapy  -C1: 5/1/23:   -C2: 5/15/23:  1454:   -C3: 6/5/23: Switched to Xeloda, Oxaliplatin, Herceptin, Keytruda due to national 5-FU shortage:  329  -C4: 7/10/23: Delayed to the thrombocytopenia:  79 to 35 Will switch back to 5-FU  -C5: 7/24/23:  21.5  -PET/CT from 7/27/23 showed resolution of disease   -C6: 8/7/23:  15.1  -C7: 8/28/23: Ca19-9 Held Oxaliplatin d/t neuropathy.  13.9  -C8: 9/11/23:  12.3 Oxaliplatin held. Signatera negative     -Herceptin and Keytruda maintenance q3 weeks since Signatera was negative   -C1: 9/25/23:  10.3  -C2: 10/9/23: Ca19-9 15. 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 Also noted to have diffuse GGO and MS/Hilar LAD-unclear if this related to previous infections. Given findings, restarting FOLFOX + herceptin + IO recommended.      -admitted from 1/8/24-1/13/24 for persistent dysphagia and bronchesophageal stent. Plan is for an ASD occluder and removal of the bronchial stent as an outpatient.      -1/17/24: EGD with fistula closure with ASD occluder and removal of the bronchial stent.     -1/22/24: Hercepin + IO treatment only since he was having sternotomy wires removed later in the week      -FOLFOX + Herceptin + Opdivo restarted                -C1: 2/5/24:  846               -C2: 2/22/24:  was not  drawn     -Admitted for aspiration PNA on 3/6/24-CT Chest/Abdomen showed extensive patchy consolidative and tree in bud appearance. There are some solid nodular components.. No new sites of disease in abdomen. Stable hepatic lesion. Of note, a right mainstain lesion was biopsied by pulmonary on 2/20/24 and was negative for cancer.                   -C3: 3/12/24:  424     -Admitted on 3/15/24 with dysphagia. This started 1 day  after chemotherapy. CT AP showed stable metastatic disease. Non specific left side small bowl mesentery nodularity could mesenteric panniculitis vs. Disease.     - Admitted 3/20/2024 for nausea/vomiting with malpositioned ASD occluder device. On 3/20/2024 he had a bronchoscopy with lavage and occluder device placement in the bronchial tree.       Problem List:  Patient Active Problem List   Diagnosis    Primary hypertension    Hyperlipidemia with target low density lipoprotein (LDL) cholesterol less than 70 mg/dL    Coronary artery disease involving coronary bypass graft of native heart with angina pectoris (HCC)    S/P CABG x 4    Nontoxic multinodular goiter    Pulmonary nodules    Thrombophlebitis leg    BRANDAN (generalized anxiety disorder)    Right shoulder Arthroscopy acromioplasty ,distal  clavicle resection, rotator cuff/labral debridment  Global 05/13/2021    Right bicipital tenosynovitis    Malignant neoplasm of esophagus (HCC)    Esophageal anastomotic leak    Esophageal obstruction    On total parenteral nutrition (TPN)    Mechanical complication of esophagostomy (HCC)    Abdominal pain of unknown etiology    Migration of esophageal stent    Gastroparesis    Esophageal carcinoma (HCC)    Normocytic anemia    Esophageal fistula    Subacute cough    Acquired bronchoesophageal fistula (HCC)    Pneumonia of both lower lobes due to infectious organism    Malignant neoplasm of pancreas (HCC)    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 difficile colitis    Dysphagia, unspecified type    Shortness of breath    Hypokalemia    Dysphagia    Narcotic drug use    History of esophageal cancer    Palliative care by specialist    Neoplasm related pain    Endobronchial mass    Hyponatremia    Thrombocytopenia (HCC)    Acute kidney injury (HCC)    Hyperglycemia    Aspiration pneumonitis (HCC)    C. difficile diarrhea    Aspiration pneumonia (HCC)    Malignant neoplasm metastatic to liver (HCC)    Hyperlipidemia    Nausea vomiting and diarrhea    Fistula, bronchoesophageal (HCC)    Iron deficiency anemia, unspecified iron deficiency anemia type    Azotemia    Palliative care encounter    Goals of care, counseling/discussion    Cancer related pain        Medical History:  Past Medical History:   Diagnosis Date    Back problem     Belching 02/01/2022    Black stools 02/01/2022    Borderline diabetes     Dx in 8/2013 - HgA1C 6.2%    C. difficile diarrhea 10/23/2022    pt treated and without symptoms    Chest pain 02/01/2022    Coronary artery disease     On 8/16/13: CABG x 4 with LIMA to LAD and SVG to diagonal, OM and PDA    Decorative tattoo 01/01/2000    Depression     Difficult intubation     h/o esophagectomy for CA; developed esophagobronchial vistula    Disorder of liver     LIVER CA    Esophageal cancer (HCC)     completed chemo    Esophageal reflux     Essential hypertension     Exposure to medical diagnostic radiation     last tx 8/18/2022    Frequent urination 01/01/2013    Gastroparesis     Heartburn 02/01/2022    High blood pressure     High cholesterol 08/01/2013    Found when I had quadruple bypass    History of COVID-19 10/16/2022    asymptomatic - pt was dx during a hospitalization for  another diagnosis. No continued symptoms    History of stomach ulcers     Hyperlipidemia     Hyperlipidemia LDL goal < 70     Indigestion 02/01/2022    Morbid obesity with BMI of 40.0-44.9, adult (HCC)     Muscle weakness     Nausea vomiting and diarrhea 03/14/2024    Nontoxic multinodular goiter     Dx in 8/2013: pt was told that imaging showed thyroid cysts per PCP    Pancreatic cancer (HCC)     last dose 12/4/2023 is scheduled for another round 12/27/23    Peripheral vascular disease (HCC)     pt denies    Personal history of antineoplastic chemotherapy     for esophageal cancer/completed    Personal history of antineoplastic chemotherapy 12/2023    pancreatic cancer    Personal history of antineoplastic chemotherapy     Last treatment 3/12/24    Problems with swallowing 02/01/2022    Pulmonary nodules     Dx in 8/2013: CT chest showed small bilateral fissural-based lung nodules less than 1 cm    S/P CABG x 4     On 8/16/13: CABG x 4 with LIMA to LAD and SVG to diagonal, OM and PDA    Shortness of breath     when coughing; no oxygen    Vomiting 02/01/2022       Surgical History:  Past Surgical History:   Procedure Laterality Date    APPENDECTOMY      APPENDECTOMY      ARTHROSCOPY OF JOINT UNLISTED      right shoulder    CABG      On 8/16/13: CABG x 4 with LIMA to LAD and SVG to diagonal, OM and PDA    CARDIAC CATH LAB      On 8/14/2013: cardiac cath showed 3-vessel disease    OTHER SURGICAL HISTORY      1.       Laparoscopic robotic-assisted esophagogastrectomy.    PORT, INDWELLING, IMP         Allergies:  No Known Allergies    Family History:  Family History   Problem Relation Age of Onset    Cancer Mother         breast and colon     Diabetes Neg        Social History:  Social History     Socioeconomic History    Marital status:      Spouse name: Not on file    Number of children: 3    Years of education: Not on file    Highest education level: Not on file   Occupational History    Occupation: works as   - on workman's comp   Tobacco Use    Smoking status: Former     Packs/day: 1.00     Years: 27.00     Additional pack years: 0.00     Total pack years: 27.00     Types: Cigarettes     Quit date: 8/15/2013     Years since quitting: 10.6    Smokeless tobacco: Never    Tobacco comments:     Quit smoking 2013   Vaping Use    Vaping Use: Never used   Substance and Sexual Activity    Alcohol use: Not Currently    Drug use: Never    Sexual activity: Not Currently     Partners: Female   Other Topics Concern     Service Not Asked    Blood Transfusions Not Asked    Caffeine Concern Yes    Occupational Exposure Not Asked    Hobby Hazards Not Asked    Sleep Concern Not Asked    Stress Concern No    Weight Concern No    Special Diet No    Back Care Not Asked    Exercise No    Bike Helmet Not Asked    Seat Belt No    Self-Exams Not Asked   Social History Narrative    Lives with life partner    Has 3 daughters - 1 lives closeby, 1 in [place], 1 in [place]     Social Determinants of Health     Financial Resource Strain: Low Risk  (12/14/2023)    Financial Resource Strain     Difficulty of Paying Living Expenses: Not very hard     Med Affordability: No   Food Insecurity: No Food Insecurity (3/20/2024)    Food Insecurity     Food Insecurity: Never true   Transportation Needs: No Transportation Needs (3/20/2024)    Transportation Needs     Lack of Transportation: No   Physical Activity: Not on file   Stress: Not on file   Social Connections: Not on file   Housing Stability: Low Risk  (3/20/2024)    Housing Stability     Housing Instability: No     Housing Instability Emergency: No       Medications:    Current Outpatient Medications:     OLANZapine 5 MG Oral Tab, Take 1 tablet (5 mg total) by mouth nightly., Disp: 30 tablet, Rfl: 3    morphINE ER 15 MG Oral Tab CR, Take 1 tablet (15 mg total) by mouth every 12 (twelve) hours., Disp: 60 tablet, Rfl: 0    metoclopramide (REGLAN) 10 MG Oral Tab, Take 1 tablet (10 mg total) by  mouth 4 (four) times daily before meals and nightly., Disp: 120 tablet, Rfl: 2    HYDROcodone-acetaminophen 5-325 MG Oral Tab, Take 1 tablet by mouth every 4 (four) hours as needed for Pain., Disp: 60 tablet, Rfl: 0    Pancrelipase, Lip-Prot-Amyl, (CREON) 55575-54296 units Oral Cap DR Particles, Take 2 capsules with meals and 1 capsule with snacks, Disp: 240 capsule, Rfl: 0    gabapentin 300 MG Oral Cap, Take 1 capsule (300 mg total) by mouth in the morning and 1 capsule (300 mg total) before bedtime., Disp: 180 capsule, Rfl: 0    benzonatate 100 MG Oral Cap, Take 1 capsule (100 mg total) by mouth 3 (three) times daily as needed for cough., Disp: 90 capsule, Rfl: 0    sertraline 100 MG Oral Tab, Take 2 tablets (200 mg total) by mouth daily., Disp: , Rfl:     losartan 50 MG Oral Tab, Take 1 tablet (50 mg total) by mouth daily., Disp: 90 tablet, Rfl: 2    Omeprazole 40 MG Oral Capsule Delayed Release, Take 1 capsule (40 mg total) by mouth 2 (two) times daily before meals., Disp: 90 capsule, Rfl: 3    metoprolol succinate ER 50 MG Oral Tablet 24 Hr, TAKE 1 TABLET BY MOUTH EVERY DAY, Disp: 90 tablet, Rfl: 1    Review of Systems:  A comprehensive 14 point review of systems was completed.  Pertinent positives and negatives noted in the HPI.    Performance Status: ECOG 1-2    Physical Examination:  General: Patient is alert and oriented x 3, not in acute distress.  Vital Signs: Height: 187.5 cm (6' 1.82\") (03/25 0937)  Weight: 78.7 kg (173 lb 8 oz) (03/25 0937)  BSA (Calculated - sq m): 2.04 sq meters (03/25 0937)  Pulse: 78 (03/25 0937)  BP: 126/74 (03/25 0937)  Temp: 98.5 °F (36.9 °C) (03/25 0937)  Do Not Use - Resp Rate: --  SpO2: 94 % (03/25 0937)  HEENT: Anicteric, conjunctivae and sclerae clear, no oropharyngeal lesion/thrush, mucous membranes are moist   Chest: Clear to auscultation. Respirations unlabored. Left side diminished throughout  Heart: Regular rate and rhythm.   Abdomen: Soft, non-distended, non-tender  with present bowel sounds.  Extremities: No edema.  Neurological: Grossly intact.   Lymphatics: There is no palpable lymphadenopathy throughout in the cervical or supraclavicular regions.   Skin: warm, dry, no erythema or rash   Psych/Depression: mood and affect are appropriate.     Labs:     Recent Results (from the past 72 hour(s))   CARBOHYDRATE ANTIGEN 19-9 [E]    Collection Time: 03/25/24  9:27 AM   Result Value Ref Range    Carbohydrate Ag 19-9 535.8 (H) <=37.0 U/mL   Comp Metabolic Panel (14)    Collection Time: 03/25/24  9:27 AM   Result Value Ref Range    Glucose 191 (H) 70 - 99 mg/dL    Sodium 135 (L) 136 - 145 mmol/L    Potassium 4.1 3.5 - 5.1 mmol/L    Chloride 105 98 - 112 mmol/L    CO2 25.0 21.0 - 32.0 mmol/L    Anion Gap 5 0 - 18 mmol/L    BUN 14 9 - 23 mg/dL    Creatinine 0.81 0.70 - 1.30 mg/dL    Calcium, Total 8.6 8.5 - 10.1 mg/dL    Calculated Osmolality 286 275 - 295 mOsm/kg    eGFR-Cr 105 >=60 mL/min/1.73m2    AST 18 15 - 37 U/L    ALT 21 16 - 61 U/L    Alkaline Phosphatase 103 45 - 117 U/L    Bilirubin, Total 0.4 0.1 - 2.0 mg/dL    Total Protein 7.3 6.4 - 8.2 g/dL    Albumin 2.8 (L) 3.4 - 5.0 g/dL    Globulin  4.5 (H) 2.8 - 4.4 g/dL    A/G Ratio 0.6 (L) 1.0 - 2.0    Patient Fasting for CMP? Patient not present    CBC W/ DIFFERENTIAL    Collection Time: 03/25/24  9:27 AM   Result Value Ref Range    WBC 7.6 4.0 - 11.0 x10(3) uL    RBC 3.80 (L) 4.30 - 5.70 x10(6)uL    HGB 11.1 (L) 13.0 - 17.5 g/dL    HCT 35.3 (L) 39.0 - 53.0 %    .0 150.0 - 450.0 10(3)uL    MCV 92.9 80.0 - 100.0 fL    MCH 29.2 26.0 - 34.0 pg    MCHC 31.4 31.0 - 37.0 g/dL    RDW 15.2 %    Neutrophil Absolute Prelim 6.26 1.50 - 7.70 x10 (3) uL    Neutrophil Absolute 6.26 1.50 - 7.70 x10(3) uL    Lymphocyte Absolute 0.77 (L) 1.00 - 4.00 x10(3) uL    Monocyte Absolute 0.33 0.10 - 1.00 x10(3) uL    Eosinophil Absolute 0.08 0.00 - 0.70 x10(3) uL    Basophil Absolute 0.02 0.00 - 0.20 x10(3) uL    Immature Granulocyte Absolute 0.13  0.00 - 1.00 x10(3) uL    Neutrophil % 82.5 %    Lymphocyte % 10.1 %    Monocyte % 4.3 %    Eosinophil % 1.1 %    Basophil % 0.3 %    Immature Granulocyte % 1.7 %       Impression/Plan    Metastatic esophageal cancer: restarted FOLFOX and trastuzumab on 2/5/2024. Recent hospitalization for occluder device. Labs reviewed, proceed with treatment today, receiving nivolumab, FOLFOX and trastuzumab, Plan for repeat PET after this cycle, discussed scheduling with patient. Also due for echocardiogram while on trastuzumab, discussed scheduling.     Planned Follow Up: 2 weeks follow up with Dr. Foster, labs and chemo    Risk Level: HIGH metastatic esophageal cancer receiving chemotherapy requiring close monitoring     The 21st Century Cures Act makes medical notes like these available to patients in the interest of transparency. Please be advised this is a medical document. Medical documents are intended to carry relevant information, facts as evident, and the clinical opinion of the practitioner. The medical note is intended as peer to peer communication and may appear blunt or direct. It is written in medical language and may contain abbreviations or verbiage that are unfamiliar.     Electronically Signed by:    ARTEMIO Brown, NP-C  Nurse Practitioner  [facility]

## 2024-03-26 RX ORDER — AMOXICILLIN AND CLAVULANATE POTASSIUM 875; 125 MG/1; MG/1
1 TABLET, FILM COATED ORAL 2 TIMES DAILY
Qty: 14 TABLET | Refills: 0 | Status: SHIPPED | OUTPATIENT
Start: 2024-03-26

## 2024-03-26 RX ORDER — VANCOMYCIN HYDROCHLORIDE 125 MG/1
125 CAPSULE ORAL 4 TIMES DAILY
Qty: 28 CAPSULE | Refills: 0 | Status: SHIPPED | OUTPATIENT
Start: 2024-03-26

## 2024-03-26 NOTE — PROGRESS NOTES
Oncology Nutrition F/U Consultation     Patient Name: Dontae Cortez Jr.  YOB: 1970  Medical Record Number: [id number]            Account Number: [id number]  Dietitian: Kayla Hagen RD, LDN     Date of visit:  3/080989     Diet Rx: high protein/calorie, post-esophagectomy diet     Pertinent Dx/PMH: Esophageal cancer; pancreatic (head) cancer       TX: FOLFOX; switched to Herceptin/Immunotherapy maintenance on 9/25/23 due to negative signatera; q2 week 5-FU + Herceptin + q6 week Keytruda. Oxaliplatin was held on C7 due to neuropathy      1. Neoadjuvant chemoRT with carbo-taxol: 7/6/22-8/10/22  2. laparoscopic robotic-assisted esophagogastrectomy with feeding jejunostomy tube placement on 9/30/2022. Complications w/ esophageal leak  3. G-POEM (EGD and pylorectomy) 12/27/22     Other pertinent subjective/objective information: recently hospitalized 3/14/2024-3/16/2024 and 3/20/2024-3/21/2024 for nausea/vomiting with malpositioned ASD occluder device. On 3/20/2024 he had a bronchoscopy with lavage and occluder device placement in the bronchial tree; 1/17/24: EGD with fistula closure with ASD occluder and removal of the bronchial stent ; 1/8/24-1/13/24 for persistent dysphagia and bronchesophageal stent. Plan is for an ASD occluder and removal of the bronchial stent as an outpatient     1/15/24: Pt meeting definition for SEVERE MALNUTRITION in the context of acute illness as evidenced by 7% unplanned wt loss x 4 weeks and po intake meeting less than 50% estimated nutrition needs.      Other pertinent subjective/objective information: diet/sx hx obtained     Pertinent Meds:     Current Outpatient Medications:     ondansetron (ZOFRAN) 8 MG tablet, Take 1 tablet (8 mg total) by mouth every 8 (eight) hours as needed for Nausea., Disp: 30 tablet, Rfl: 3    prochlorperazine (COMPAZINE) 10 mg tablet, Take 1 tablet (10 mg total) by mouth every 6 (six) hours as needed for Nausea., Disp: 30 tablet, Rfl: 3     HYDROcodone-acetaminophen 5-325 MG Oral Tab, Take 1-2 tablets by mouth every 4 (four) hours as needed for Pain., Disp: 90 tablet, Rfl: 0    vancomycin 125 MG Oral Cap, Take 1 capsule (125 mg total) by mouth in the morning and 1 capsule (125 mg total) before bedtime., Disp: , Rfl:     cetirizine 5 MG Oral Tab, Take 1 tablet (5 mg total) by mouth daily., Disp: , Rfl:     sucralfate (CARAFATE) 1 g Oral Tab, Take 1 tablet (1 g total) by mouth 3 (three) times daily as needed., Disp: 60 tablet, Rfl: 3    losartan 50 MG Oral Tab, Take 1 tablet (50 mg total) by mouth daily., Disp: 90 tablet, Rfl: 2    benzonatate 100 MG Oral Cap, Take 1 capsule (100 mg total) by mouth 3 (three) times daily as needed for cough., Disp: 30 capsule, Rfl: 0    Omeprazole 40 MG Oral Capsule Delayed Release, Take 1 capsule (40 mg total) by mouth 2 (two) times daily before meals., Disp: 90 capsule, Rfl: 3    sertraline 100 MG Oral Tab, Take 1 tablet (100 mg total) by mouth daily., Disp: 90 tablet, Rfl: 1    Pancrelipase, Lip-Prot-Amyl, (CREON) 93003-03537 units Oral Cap DR Particles, Take 2 capsules with meals and 1 capsule with snacks, Disp: 240 capsule, Rfl: 0    metoprolol succinate ER 50 MG Oral Tablet 24 Hr, TAKE 1 TABLET BY MOUTH EVERY DAY (Patient taking differently: Take 1 tablet (50 mg total) by mouth daily.), Disp: 90 tablet, Rfl: 1     Pertinent Labs: noted     Height: 6'1.82\"                     IBW: 190 +/- 10%     WT HX:   03/26/24 78.7 kg (173 lb 8 oz)   02/05/24 78.9 kg (174 lb)   01/23/24 80.3 kg (177 lb)   01/22/24 80.5 kg (177 lb 8 oz)   01/17/24 77.6 kg (171 lb)   01/16/24 78.5 kg (173 lb)   01/15/24 77.6 kg (171 lb)   01/08/24 81.6 kg (180 lb)   12/29/23 81.6 kg (180 lb)   12/29/23 83.5 kg (184 lb)   12/26/23 83.5 kg (184 lb)         Estimated Nutrition Needs: 30-35 kcals/kg = 8142-5342 KCALS/d; 1.5 gms protein/kg = 120 gms/d     Assessment/Plan: RD f/u w/ pt in tx room today.      Pt notes after most recent procedure as noted  above, he is tolerating more foods and liquids w/o incident; however, pt notes when bending over after eating he experiences some regurgitation.     He continues to experiment w/ different foods and portions and has bee having small, frequent meals. He enjoys lactose free ice cream.     RD suggested goal of 1 lb wt gain by next appointment w/ pt noting he believes he can do.      RD offered support and will continue to follow.      The 21st Century Cures Act makes medical notes like these available to patients in the interest of transparency. Please be advised this is a medical document. Medical documents are intended to carry relevant information, facts as evident, and the clinical opinion of the practitioner. The medical note is intended as peer to peer communication and may appear blunt or direct. It is written in medical language and may contain abbreviations or verbiage that are unfamiliar.

## 2024-04-01 ENCOUNTER — APPOINTMENT (OUTPATIENT)
Dept: HEMATOLOGY/ONCOLOGY | Age: 54
End: 2024-04-01
Attending: INTERNAL MEDICINE

## 2024-04-03 ENCOUNTER — SOCIAL WORK SERVICES (OUTPATIENT)
Dept: HEMATOLOGY/ONCOLOGY | Facility: HOSPITAL | Age: 54
End: 2024-04-03

## 2024-04-03 ENCOUNTER — TELEPHONE (OUTPATIENT)
Dept: FAMILY MEDICINE CLINIC | Facility: CLINIC | Age: 54
End: 2024-04-03

## 2024-04-03 DIAGNOSIS — J86.0: Primary | ICD-10-CM

## 2024-04-03 NOTE — TELEPHONE ENCOUNTER
Problems   The patient or proxy has not reviewed this information.     Medications   The patient or proxy has not reviewed this information, and there are updates pending:   Requested Medication Removals Start Date Reported By  Comments   amoxicillin clavulanate 875-125 MG Tabs 3/26/2024 Ronen Cortez Jr.    vancomycin 125 MG Caps 3/26/2024 Ronen Cortez Jr.      Allergies   The patient or proxy has not reviewed this information.

## 2024-04-03 NOTE — PROGRESS NOTES
MARIZA assisted with [employer] forms. Completed form faxed to [employer] fax [phone].  MARIZA sent the confirmation and completed forms to pt via [employer] message.

## 2024-04-04 ENCOUNTER — SOCIAL WORK SERVICES (OUTPATIENT)
Dept: HEMATOLOGY/ONCOLOGY | Facility: HOSPITAL | Age: 54
End: 2024-04-04

## 2024-04-04 ENCOUNTER — TELEPHONE (OUTPATIENT)
Dept: HEMATOLOGY/ONCOLOGY | Facility: HOSPITAL | Age: 54
End: 2024-04-04

## 2024-04-04 NOTE — PROGRESS NOTES
SW assisted with [facility] paperwork for pt's wife, Alysha Mcduffie.  Completed paperwork faxed to [employer] fax [phone].    MARIZA sent a copy to pt via [employer] message.    SHARON LittleW   at [address], [place] [address], [place] [address]  [id number] [place] [address]  Ph: [phone] [email or web address]  Fax: [phone]

## 2024-04-04 NOTE — TELEPHONE ENCOUNTER
ONCOLOGY NUTRITION PHONE CONVERSATION:     Pt reaching out to this RD inquiring whether inpt RD from [facility] contacted this RD as she stated to pt she would. This RD has not spoken to [facility] RD.     This RD left message w/ inpt RD at [facility] as per pt request.

## 2024-04-08 ENCOUNTER — APPOINTMENT (OUTPATIENT)
Dept: HEMATOLOGY/ONCOLOGY | Age: 54
End: 2024-04-08
Attending: INTERNAL MEDICINE

## 2024-04-08 ENCOUNTER — APPOINTMENT (OUTPATIENT)
Dept: GENERAL RADIOLOGY | Facility: HOSPITAL | Age: 54
End: 2024-04-08
Attending: EMERGENCY MEDICINE
Payer: COMMERCIAL

## 2024-04-08 ENCOUNTER — HOSPITAL ENCOUNTER (INPATIENT)
Facility: HOSPITAL | Age: 54
LOS: 3 days | Discharge: HOME | End: 2024-04-11
Attending: EMERGENCY MEDICINE | Admitting: HOSPITALIST
Payer: COMMERCIAL

## 2024-04-08 DIAGNOSIS — J69.0 ASPIRATION PNEUMONITIS (HCC): Primary | ICD-10-CM

## 2024-04-08 DIAGNOSIS — C15.9 MALIGNANT NEOPLASM OF ESOPHAGUS, UNSPECIFIED LOCATION (HCC): ICD-10-CM

## 2024-04-08 DIAGNOSIS — R11.10 INTRACTABLE VOMITING: ICD-10-CM

## 2024-04-08 PROBLEM — D64.9 ANEMIA: Status: ACTIVE | Noted: 2024-01-01

## 2024-04-08 PROBLEM — R06.00 DYSPNEA AND RESPIRATORY ABNORMALITIES: Status: ACTIVE | Noted: 2024-01-08

## 2024-04-08 PROBLEM — R06.89 DYSPNEA AND RESPIRATORY ABNORMALITIES: Status: ACTIVE | Noted: 2024-01-01

## 2024-04-08 PROBLEM — R06.89 DYSPNEA AND RESPIRATORY ABNORMALITIES: Status: ACTIVE | Noted: 2024-01-08

## 2024-04-08 PROBLEM — D64.9 ANEMIA: Status: ACTIVE | Noted: 2024-04-08

## 2024-04-08 PROBLEM — R06.00 DYSPNEA AND RESPIRATORY ABNORMALITIES: Status: ACTIVE | Noted: 2024-01-01

## 2024-04-08 LAB
ALBUMIN SERPL-MCNC: 3 G/DL (ref 3.4–5)
ALBUMIN/GLOB SERPL: 0.7 {RATIO} (ref 1–2)
ALP LIVER SERPL-CCNC: 83 U/L
ALT SERPL-CCNC: 16 U/L
ANION GAP SERPL CALC-SCNC: 2 MMOL/L (ref 0–18)
AST SERPL-CCNC: 14 U/L (ref 15–37)
ATRIAL RATE: 88 BPM
BASOPHILS # BLD AUTO: 0.04 X10(3) UL (ref 0–0.2)
BASOPHILS NFR BLD AUTO: 0.4 %
BILIRUB SERPL-MCNC: 0.5 MG/DL (ref 0.1–2)
BUN BLD-MCNC: 23 MG/DL (ref 9–23)
CALCIUM BLD-MCNC: 9.5 MG/DL (ref 8.5–10.1)
CHLORIDE SERPL-SCNC: 108 MMOL/L (ref 98–112)
CO2 SERPL-SCNC: 30 MMOL/L (ref 21–32)
CREAT BLD-MCNC: 0.71 MG/DL
EGFRCR SERPLBLD CKD-EPI 2021: 109 ML/MIN/1.73M2 (ref 60–?)
EOSINOPHIL # BLD AUTO: 0.15 X10(3) UL (ref 0–0.7)
EOSINOPHIL NFR BLD AUTO: 1.7 %
ERYTHROCYTE [DISTWIDTH] IN BLOOD BY AUTOMATED COUNT: 16 %
GLOBULIN PLAS-MCNC: 4.6 G/DL (ref 2.8–4.4)
GLUCOSE BLD-MCNC: 118 MG/DL (ref 70–99)
HCT VFR BLD AUTO: 36.9 %
HGB BLD-MCNC: 11.6 G/DL
IMM GRANULOCYTES # BLD AUTO: 0.06 X10(3) UL (ref 0–1)
IMM GRANULOCYTES NFR BLD: 0.7 %
LACTATE SERPL-SCNC: 0.8 MMOL/L (ref 0.4–2)
LYMPHOCYTES # BLD AUTO: 0.81 X10(3) UL (ref 1–4)
LYMPHOCYTES NFR BLD AUTO: 9 %
MCH RBC QN AUTO: 28.9 PG (ref 26–34)
MCHC RBC AUTO-ENTMCNC: 31.4 G/DL (ref 31–37)
MCV RBC AUTO: 91.8 FL
MONOCYTES # BLD AUTO: 0.9 X10(3) UL (ref 0.1–1)
MONOCYTES NFR BLD AUTO: 10 %
NEUTROPHILS # BLD AUTO: 7.03 X10 (3) UL (ref 1.5–7.7)
NEUTROPHILS # BLD AUTO: 7.03 X10(3) UL (ref 1.5–7.7)
NEUTROPHILS NFR BLD AUTO: 78.2 %
OSMOLALITY SERPL CALC.SUM OF ELEC: 295 MOSM/KG (ref 275–295)
P AXIS: 73 DEGREES
P-R INTERVAL: 152 MS
PLATELET # BLD AUTO: 190 10(3)UL (ref 150–450)
POTASSIUM SERPL-SCNC: 4.4 MMOL/L (ref 3.5–5.1)
PROT SERPL-MCNC: 7.6 G/DL (ref 6.4–8.2)
Q-T INTERVAL: 348 MS
QRS DURATION: 84 MS
QTC CALCULATION (BEZET): 421 MS
R AXIS: 79 DEGREES
RBC # BLD AUTO: 4.02 X10(6)UL
SODIUM SERPL-SCNC: 140 MMOL/L (ref 136–145)
T AXIS: 66 DEGREES
TROPONIN I SERPL HS-MCNC: 11 NG/L
VENTRICULAR RATE: 88 BPM
WBC # BLD AUTO: 9 X10(3) UL (ref 4–11)

## 2024-04-08 PROCEDURE — 99223 1ST HOSP IP/OBS HIGH 75: CPT | Performed by: HOSPITALIST

## 2024-04-08 PROCEDURE — 71045 X-RAY EXAM CHEST 1 VIEW: CPT | Performed by: EMERGENCY MEDICINE

## 2024-04-08 PROCEDURE — 99222 1ST HOSP IP/OBS MODERATE 55: CPT | Performed by: INTERNAL MEDICINE

## 2024-04-08 RX ORDER — MORPHINE SULFATE 2 MG/ML
2 INJECTION, SOLUTION INTRAMUSCULAR; INTRAVENOUS EVERY 2 HOUR PRN
Status: DISCONTINUED | OUTPATIENT
Start: 2024-04-08 | End: 2024-04-11

## 2024-04-08 RX ORDER — ONDANSETRON 2 MG/ML
4 INJECTION INTRAMUSCULAR; INTRAVENOUS EVERY 4 HOURS PRN
Status: ACTIVE | OUTPATIENT
Start: 2024-04-08 | End: 2024-04-08

## 2024-04-08 RX ORDER — DIPHENHYDRAMINE HCL 25 MG
25 CAPSULE ORAL EVERY 6 HOURS PRN
Status: DISCONTINUED | OUTPATIENT
Start: 2024-04-08 | End: 2024-04-08

## 2024-04-08 RX ORDER — MORPHINE SULFATE 2 MG/ML
1 INJECTION, SOLUTION INTRAMUSCULAR; INTRAVENOUS EVERY 2 HOUR PRN
Status: DISCONTINUED | OUTPATIENT
Start: 2024-04-08 | End: 2024-04-11

## 2024-04-08 RX ORDER — DIPHENHYDRAMINE HCL 25 MG
25 CAPSULE ORAL EVERY 4 HOURS PRN
Status: DISCONTINUED | OUTPATIENT
Start: 2024-04-08 | End: 2024-04-11

## 2024-04-08 RX ORDER — DIPHENHYDRAMINE HYDROCHLORIDE 50 MG/ML
12.5 INJECTION INTRAMUSCULAR; INTRAVENOUS EVERY 4 HOURS PRN
Status: DISCONTINUED | OUTPATIENT
Start: 2024-04-08 | End: 2024-04-11

## 2024-04-08 RX ORDER — MORPHINE SULFATE 2 MG/ML
0.5 INJECTION, SOLUTION INTRAMUSCULAR; INTRAVENOUS EVERY 2 HOUR PRN
Status: DISCONTINUED | OUTPATIENT
Start: 2024-04-08 | End: 2024-04-11

## 2024-04-08 RX ORDER — SODIUM CHLORIDE 9 MG/ML
INJECTION, SOLUTION INTRAVENOUS CONTINUOUS
Status: ACTIVE | OUTPATIENT
Start: 2024-04-08 | End: 2024-04-08

## 2024-04-08 RX ORDER — SODIUM CHLORIDE 9 MG/ML
INJECTION, SOLUTION INTRAVENOUS CONTINUOUS
Status: DISCONTINUED | OUTPATIENT
Start: 2024-04-08 | End: 2024-04-11

## 2024-04-08 RX ORDER — ONDANSETRON 2 MG/ML
4 INJECTION INTRAMUSCULAR; INTRAVENOUS ONCE
Status: COMPLETED | OUTPATIENT
Start: 2024-04-08 | End: 2024-04-08

## 2024-04-08 RX ORDER — HYDROMORPHONE HYDROCHLORIDE 1 MG/ML
0.5 INJECTION, SOLUTION INTRAMUSCULAR; INTRAVENOUS; SUBCUTANEOUS EVERY 30 MIN PRN
Status: DISCONTINUED | OUTPATIENT
Start: 2024-04-08 | End: 2024-04-08

## 2024-04-08 NOTE — CONSULTS
[facility]  Report of Consultation    Dontae Cortez Jr. Patient Status:  Inpatient    10/15/1970 MRN [id number]   Location [facility] 4NW-A Attending Rochelle Tomlinson MD   Hosp Day # 0 PCP Adrian Horowitz MD     Reason for Consultation:  Management of bronchial esophageal fistula  Dysphagia with recurrent aspiration    History of Present Illness:  Dontae Cortez Jr. is a a(n) 53 year old male.  Ex-smoker x 10 years with an extensive history that includes hypertension hyperlipidemia coronary artery disease status post CABG and most significantly history of metastatic esophageal carcinoma status esophagectomy and gastric pull-through with positive PET scan 2024 and currently receiving ongoing FOLFOX with Herceptin and Opdivo (due today).  His course has been complicated by recurrent bronchoesophageal fistula with history of stenting and ASD occluder device placement in the past.  Recurrent admission in March for aspiration pneumonia with recurrent vomiting with removal of ASD occluder device then replaced with 2 stents placed.  Admitted at [facility] with EGD  showing malpositioning of the ASD occluder subsequently removed repeat esophageal stent placed with video swallow documenting ongoing fistula leak.  Repeat J-tube placement was recommended and scheduled for  at [facility] -in the interim to begin TPN.  In the interim he has noted difficulty swallowing liquids with coughing and vomiting and some feelings of food sticking mid chest.  He has had recurrent vomiting/spitting.  Increasing anterior chest pain prompted emergency room visit and admission.  Patient has remained afebrile he is coughing and expectorating saliva with some blood streaking and questionable protein shakes.-Start protein shakes this morning as his last p.o. he reports significant dyspnea on exertion with walking      History:  Past Medical History:   Diagnosis Date    Back problem     Belching 2022    Black stools  02/01/2022    Borderline diabetes     Dx in 8/2013 - HgA1C 6.2%    C. difficile diarrhea 10/23/2022    pt treated and without symptoms    Chest pain 02/01/2022    Coronary artery disease     On 8/16/13: CABG x 4 with LIMA to LAD and SVG to diagonal, OM and PDA    Decorative tattoo 01/01/2000    Depression     Difficult intubation     h/o esophagectomy for CA; developed esophagobronchial vistula    Disorder of liver     LIVER CA    Esophageal cancer (HCC)     completed chemo    Esophageal reflux     Essential hypertension     Exposure to medical diagnostic radiation     last tx 8/18/2022    Frequent urination 01/01/2013    Gastroparesis     Heartburn 02/01/2022    High blood pressure     High cholesterol 08/01/2013    Found when I had quadruple bypass    History of COVID-19 10/16/2022    asymptomatic - pt was dx during a hospitalization for another diagnosis. No continued symptoms    History of stomach ulcers     Hyperlipidemia     Hyperlipidemia LDL goal < 70     Indigestion 02/01/2022    Morbid obesity with BMI of 40.0-44.9, adult (HCC)     Muscle weakness     Nausea vomiting and diarrhea 03/14/2024    Nontoxic multinodular goiter     Dx in 8/2013: pt was told that imaging showed thyroid cysts per PCP    Pancreatic cancer (HCC)     last dose 12/4/2023 is scheduled for another round 12/27/23    Peripheral vascular disease (HCC)     pt denies    Personal history of antineoplastic chemotherapy     for esophageal cancer/completed    Personal history of antineoplastic chemotherapy 12/2023    pancreatic cancer    Personal history of antineoplastic chemotherapy     Last treatment 3/12/24    Problems with swallowing 02/01/2022    Pulmonary nodules     Dx in 8/2013: CT chest showed small bilateral fissural-based lung nodules less than 1 cm    S/P CABG x 4     On 8/16/13: CABG x 4 with LIMA to LAD and SVG to diagonal, OM and PDA    Shortness of breath     when coughing; no oxygen    Vomiting 02/01/2022     Family History    Problem Relation Age of Onset    Cancer Mother         breast and colon     Diabetes Neg       reports that he quit smoking about 10 years ago. His smoking use included cigarettes. He has a 27 pack-year smoking history. He has never used smokeless tobacco. He reports that he does not currently use alcohol. He reports that he does not use drugs.    Allergies:  No Known Allergies    Medications:  Medications Prior to Admission   Medication Sig Dispense Refill Last Dose    OLANZapine 5 MG Oral Tab Take 1 tablet (5 mg total) by mouth nightly. 30 tablet 3 Past Month    morphINE ER 15 MG Oral Tab CR Take 1 tablet (15 mg total) by mouth every 12 (twelve) hours. 60 tablet 0 Past Month    metoclopramide (REGLAN) 10 MG Oral Tab Take 1 tablet (10 mg total) by mouth 4 (four) times daily before meals and nightly. 120 tablet 2 Past Month    HYDROcodone-acetaminophen 5-325 MG Oral Tab Take 1 tablet by mouth every 4 (four) hours as needed for Pain. 60 tablet 0 Past Month    Pancrelipase, Lip-Prot-Amyl, (CREON) 56106-30255 units Oral Cap DR Particles Take 2 capsules with meals and 1 capsule with snacks 240 capsule 0 Past Month    gabapentin 300 MG Oral Cap Take 1 capsule (300 mg total) by mouth in the morning and 1 capsule (300 mg total) before bedtime. 180 capsule 0 Past Month    benzonatate 100 MG Oral Cap Take 1 capsule (100 mg total) by mouth 3 (three) times daily as needed for cough. 90 capsule 0 Past Month    sertraline 100 MG Oral Tab Take 2 tablets (200 mg total) by mouth daily.   Past Month    losartan 50 MG Oral Tab Take 1 tablet (50 mg total) by mouth daily. 90 tablet 2 Past Month    Omeprazole 40 MG Oral Capsule Delayed Release Take 1 capsule (40 mg total) by mouth 2 (two) times daily before meals. 90 capsule 3 Past Month    metoprolol succinate ER 50 MG Oral Tablet 24 Hr TAKE 1 TABLET BY MOUTH EVERY DAY 90 tablet 1 Past Month       Review of Systems:    Constitutional: Thinks he is lost 20 pounds in the past 2 to 4  weeks  Eyes: Denies any vision changes  Ears, nose, mouth, throat, and face: Complains of very dry throat denies any mechanical difficulty in swallowing  Respiratory: Cough dyspnea on exertion as above  Cardiovascular: Unaware of palpitations chest pain now resolved  Gastrointestinal: Vomiting as above denies any diarrhea  Musculoskeletal: Denies any lower extremity edema or swelling denies any skin lesions denies any issues  Neurological:      All other review of systems are negative.  Some coughing  Sleeping    Vital signs in last 24 hours:  Patient Vitals for the past 24 hrs:   BP Temp Temp src Pulse Resp SpO2 Height Weight   04/08/24 1343 (!) 166/91 97.3 °F (36.3 °C) Oral 65 19 98 % -- --   04/08/24 1342 (!) 165/93 97.3 °F (36.3 °C) Oral 84 18 93 % -- 150 lb 14.4 oz (68.4 kg)   04/08/24 1300 148/88 -- -- 68 15 93 % -- --   04/08/24 1230 151/86 -- -- 76 19 91 % -- --   04/08/24 1130 149/90 -- -- 79 12 98 % -- --   04/08/24 1019 143/90 97.2 °F (36.2 °C) Temporal 103 -- 91 % 6' 2\" (1.88 m) 150 lb (68 kg)         Physical Exam:   General: alert, cooperative, oriented.  No respiratory distress.  Thin   Head: Normocephalic, without obvious abnormality, atraumatic.   Eyes/Nose: Pupils equal and reactive   Throat: Lips, mucosa, and tongue normal.  No thrush noted.  Dry appearing   Neck: trachea midline, no adenopathy, no thyromegaly. No JVD.  90 degrees   Lungs: Diminished tones right greater than left slight dullness right base no auscultated wheeze   Chest wall: No tenderness or deformity.   Heart: Regular rate and rhythm no murmurs noted   Abdomen: soft, non-distended, no masses, no guarding, no     Rebound.  Flat nontender   Extremity: No edema   Skin: No rashes or lesions.   Neurological: Alert, interactive, no focal deficits    Lab Data Review:  Lab Results   Component Value Date    WBC 9.0 04/08/2024    HGB 11.6 04/08/2024    HCT 36.9 04/08/2024    .0 04/08/2024    CREATSERUM 0.71 04/08/2024    BUN 23  04/08/2024     04/08/2024    K 4.4 04/08/2024     04/08/2024    CO2 30.0 04/08/2024     04/08/2024    CA 9.5 04/08/2024    ALB 3.0 04/08/2024    ALKPHO 83 04/08/2024    BILT 0.5 04/08/2024    TP 7.6 04/08/2024    AST 14 04/08/2024    ALT 16 04/08/2024       Cultures: Blood cultures thus far negative  Candida growing from bronchoscopy specimen 3/20/2024 AFB remains negative       Radiology:  XR CHEST AP PORTABLE  (CPT=71045)    Result Date: 4/8/2024  CONCLUSION:  1. When compared to 3/14/2024 chest radiograph, placement of a stent.  Please see details as above.   LOCATION:  [facility]      Dictated by (CST): Isidra Briones MD on 4/08/2024 at 11:44 AM     Finalized by (CST): Isidra Briones MD on 4/08/2024 at 11:48 AM      Chest x-ray reviewed with evidence of noted--no evidence of focal infiltrate right diaphragm no large pleural effusion    Assessment and Plan:  Patient Active Problem List   Diagnosis    Primary hypertension    Hyperlipidemia with target low density lipoprotein (LDL) cholesterol less than 70 mg/dL    Coronary artery disease involving coronary bypass graft of native heart with angina pectoris (HCC)    S/P CABG x 4    Nontoxic multinodular goiter    Pulmonary nodules    Thrombophlebitis leg    BRANDAN (generalized anxiety disorder)    Right shoulder Arthroscopy acromioplasty ,distal  clavicle resection, rotator cuff/labral debridment  Global 05/13/2021    Right bicipital tenosynovitis    Malignant neoplasm of esophagus (HCC)    Esophageal anastomotic leak    Esophageal obstruction    On total parenteral nutrition (TPN)    Mechanical complication of esophagostomy (HCC)    Abdominal pain of unknown etiology    Migration of esophageal stent    Gastroparesis    Esophageal carcinoma (HCC)    Normocytic anemia    Esophageal fistula    Subacute cough    Acquired bronchoesophageal fistula (HCC)    Pneumonia of both lower lobes due to infectious organism    Malignant neoplasm of pancreas (HCC)     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 difficile colitis    Dysphagia, unspecified type    Shortness of breath    Hypokalemia    Dysphagia    Narcotic drug use    History of esophageal cancer    Palliative care by specialist    Neoplasm related pain    Endobronchial mass    Hyponatremia    Thrombocytopenia (HCC)    Acute kidney injury (HCC)    Hyperglycemia    Aspiration pneumonitis (HCC)    C. difficile diarrhea    Aspiration pneumonia (HCC)    Malignant neoplasm metastatic to liver (HCC)    Hyperlipidemia    Nausea vomiting and diarrhea    Fistula, bronchoesophageal (HCC)    Iron deficiency anemia, unspecified iron deficiency anemia type    Azotemia    Palliative care encounter    Goals of care, counseling/discussion    Cancer related pain    Anemia       Assessment:  Dyspnea/cough-suspected ongoing aspiration injury without evidence of aspiration pneumonia at this time  Recurrent bronchial esophageal fistula with multiple interventions with ongoing aspiration risk  History of metastatic esophageal adenocarcinoma status post esophagectomy and gastric pull-through-remains on chemotherapy with positive BX pancreas   Malnutrition/hypoalbuminemia--now agreeable to TPN-plans for possible J-tube placement--remains n.p.o.      Plan:  Agree with IV fluid--remain n.p.o. at this time  GI and IP evaluation in a.m.  Serial chest x-rays and following for indication for antibiotics    Mago Zavala MD  4/8/2024  5:18 PM

## 2024-04-08 NOTE — ED PROVIDER NOTES
Patient Seen in: [facility] Emergency Department      History     Chief Complaint   Patient presents with    Chest Pain Angina    Fever     Stated Complaint: Chest pain, fever    Subjective:   HPI    53-year-old male who presents to the emergency department complaining of intractable emesis, shortness of breath, chest discomfort.  No reported fevers but has had occasional chills.  The patient was recently discharged from an outside hospital.  He has been on TPN because of his difficulty tolerating things by mouth and he needs his jejunostomy tube replaced.  Said he was trying to take things by mouth but when he would drink he would have episodes of emesis.  Reviewing his records he contacted his oncologist today on 4/8/2024 to let them know that he has had persistent episodes of emesis and cannot tolerate things by mouth.  Family noted that with the patient at this time tells me that they have noticed that he has had increasing dyspnea when he is walking for short distances and concerned that he had aspiration which she has had in the past.  The patient has a history of esophageal cancer.    Objective:   Past Medical History:   Diagnosis Date    Back problem     Belching 02/01/2022    Black stools 02/01/2022    Borderline diabetes     Dx in 8/2013 - HgA1C 6.2%    C. difficile diarrhea 10/23/2022    pt treated and without symptoms    Chest pain 02/01/2022    Coronary artery disease     On 8/16/13: CABG x 4 with LIMA to LAD and SVG to diagonal, OM and PDA    Decorative tattoo 01/01/2000    Depression     Difficult intubation     h/o esophagectomy for CA; developed esophagobronchial vistula    Disorder of liver     LIVER CA    Esophageal cancer (HCC)     completed chemo    Esophageal reflux     Essential hypertension     Exposure to medical diagnostic radiation     last tx 8/18/2022    Frequent urination 01/01/2013    Gastroparesis     Heartburn 02/01/2022    High blood pressure     High cholesterol 08/01/2013     Found when I had quadruple bypass    History of COVID-19 10/16/2022    asymptomatic - pt was dx during a hospitalization for another diagnosis. No continued symptoms    History of stomach ulcers     Hyperlipidemia     Hyperlipidemia LDL goal < 70     Indigestion 02/01/2022    Morbid obesity with BMI of 40.0-44.9, adult (HCC)     Muscle weakness     Nausea vomiting and diarrhea 03/14/2024    Nontoxic multinodular goiter     Dx in 8/2013: pt was told that imaging showed thyroid cysts per PCP    Pancreatic cancer (HCC)     last dose 12/4/2023 is scheduled for another round 12/27/23    Peripheral vascular disease (HCC)     pt denies    Personal history of antineoplastic chemotherapy     for esophageal cancer/completed    Personal history of antineoplastic chemotherapy 12/2023    pancreatic cancer    Personal history of antineoplastic chemotherapy     Last treatment 3/12/24    Problems with swallowing 02/01/2022    Pulmonary nodules     Dx in 8/2013: CT chest showed small bilateral fissural-based lung nodules less than 1 cm    S/P CABG x 4     On 8/16/13: CABG x 4 with LIMA to LAD and SVG to diagonal, OM and PDA    Shortness of breath     when coughing; no oxygen    Vomiting 02/01/2022              Past Surgical History:   Procedure Laterality Date    APPENDECTOMY      APPENDECTOMY      ARTHROSCOPY OF JOINT UNLISTED      right shoulder    CABG      On 8/16/13: CABG x 4 with LIMA to LAD and SVG to diagonal, OM and PDA    CARDIAC CATH LAB      On 8/14/2013: cardiac cath showed 3-vessel disease    OTHER SURGICAL HISTORY      1.       Laparoscopic robotic-assisted esophagogastrectomy.    PORT, INDWELLING, IMP                  Social History     Socioeconomic History    Marital status:     Number of children: 3   Occupational History    Occupation: works as  - on workman's comp   Tobacco Use    Smoking status: Former     Packs/day: 1.00     Years: 27.00     Additional pack years: 0.00     Total pack  years: 27.00     Types: Cigarettes     Quit date: 8/15/2013     Years since quitting: 10.6    Smokeless tobacco: Never    Tobacco comments:     Quit smoking 2013   Vaping Use    Vaping Use: Never used   Substance and Sexual Activity    Alcohol use: Not Currently    Drug use: Never    Sexual activity: Not Currently     Partners: Female   Other Topics Concern    Caffeine Concern Yes    Stress Concern No    Weight Concern No    Special Diet No    Exercise No    Seat Belt No   Social History Narrative    Lives with life partner    Has 3 daughters - 1 lives closeby, 1 in [place], 1 in [place]     Social Determinants of Health     Financial Resource Strain: Low Risk  (12/14/2023)    Financial Resource Strain     Difficulty of Paying Living Expenses: Not very hard     Med Affordability: No   Food Insecurity: No Food Insecurity (4/8/2024)    Food Insecurity     Food Insecurity: Never true   Transportation Needs: No Transportation Needs (4/8/2024)    Transportation Needs     Lack of Transportation: No   Housing Stability: Low Risk  (4/8/2024)    Housing Stability     Housing Instability: No     Housing Instability Emergency: No              Review of Systems    Positive for stated complaint: Chest pain, fever  Other systems are as noted in HPI.  Constitutional and vital signs reviewed.      All other systems reviewed and negative except as noted above.    Physical Exam     ED Triage Vitals   BP 04/08/24 1019 143/90   Pulse 04/08/24 1019 103   Resp 04/08/24 1130 12   Temp 04/08/24 1019 97.2 °F (36.2 °C)   Temp src 04/08/24 1019 Temporal   SpO2 04/08/24 1019 91 %   O2 Device 04/08/24 1019 None (Room air)       Current:BP (!) 166/91 (BP Location: Left arm)   Pulse 65   Temp 97.3 °F (36.3 °C) (Oral)   Resp 19   Ht 188 cm (6' 2\")   Wt 68.4 kg   SpO2 98%   BMI 19.37 kg/m²         Physical Exam  General: This a pleasant pale and thin appearing 53-year-old male.  Alert and oriented x 3.  HEENT: Pupils are equal reactive to light.   Extra ocular motions are intact.  No scleral icterus or conjunctival pallor.  Lungs: There are some crackles on the right lung base on exam.    Cardiac: Heart rate of 110 normal S1 and 2 without murmurs or ectopy appreciated  Abdomen: Soft on examination without tenderness to deep palpation or to percussion.  No masses appreciated.  Bowel sounds are normoactive.  No CVA tenderness.  Extremities: Moving all 4 without difficulty no deformities.    ED Course     Labs Reviewed   COMP METABOLIC PANEL (14) - Abnormal; Notable for the following components:       Result Value    Glucose 118 (*)     AST 14 (*)     Albumin 3.0 (*)     Globulin  4.6 (*)     A/G Ratio 0.7 (*)     All other components within normal limits   CBC W/ DIFFERENTIAL - Abnormal; Notable for the following components:    RBC 4.02 (*)     HGB 11.6 (*)     HCT 36.9 (*)     Lymphocyte Absolute 0.81 (*)     All other components within normal limits   LACTIC ACID, PLASMA - Normal   TROPONIN I HIGH SENSITIVITY - Normal   CBC WITH DIFFERENTIAL WITH PLATELET    Narrative:     The following orders were created for panel order CBC With Differential With Platelet.  Procedure                               Abnormality         Status                     ---------                               -----------         ------                     CBC W/ DIFFERENTIAL[[id number]]          Abnormal            Final result                 Please view results for these tests on the individual orders.   BLOOD CULTURE   BLOOD CULTURE     EKG    Rate, intervals and axes as noted on EKG Report.  Rate: 88  Rhythm: Sinus Rhythm  Reading: Normal sinus rhythm possible left atrial enlargement         Narrative  PROCEDURE:  XR CHEST AP PORTABLE  (CPT=71045)     TECHNIQUE:  AP chest radiograph was obtained.     COMPARISON:  [facility] , CT, CT ABDOMEN+PELVIS(CONTRAST ONLY)(CPT=74177), 3/14/2024, 10:14 PM.  [facility] , XR, XR CHEST AP PORTABLE  (CPT=71045), 3/14/2024, 6:01 PM.     INDICATIONS:  Chest  pain, fever     PATIENT STATED HISTORY: (As transcribed by Technologist)  Patient states he had chest pain for months.         FINDINGS:  Patient is rotated and tilted towards the right.  Cardiomediastinal silhouette is within normal limits in size with CT compatible left Port-A-Cath and left hilar surgical clips.  A right PICC is noted with tip adjacent to left Port-A-Cath tip.    Post surgical changes of a gastric pull-through as per history with placement of a stent projecting over the mid to lower mediastinum.  The right hemidiaphragm remains flattened with blunting of the right costophrenic angle.  Bibasilar reticular  nodular densities also involve the right upper lobe, infectious/inflammatory/neoplastic process cannot be excluded, correlate clinically.  No new focal consolidation or pneumothorax.                  Impression  CONCLUSION:    1. When compared to 3/14/2024 chest radiograph, placement of a stent.  Please see details as above.        LOCATION:  [facility]                 Dictated by (CST): Isidra Briones MD on 4/08/2024 at 11:44 AM      Finalized by (CST): Isidra Briones MD on 4/08/2024 at 11:48 AM                   MDM    Differential diagnose includes but is not limited to aspiration pneumonitis, esophageal rupture, cardiac ischemia, pneumothorax, electrolyte abnormality, acute kidney injury.  Admission disposition: 4/8/2024 12:49 PM       Patient's O2 sats were 91% and he has crackles in his right lung base.  With his excessive amounts of vomiting concerned that he may have aspiration pneumonitis.  Concern also for assess of volume loss due to the patient's excessive amounts of emesis.  Inability to tolerate by mouth.  Troponin was normal.  Lactic acid was 0.8 which is normal.  The patient is not septic.  Glucose 118 AST 14.  Hemoglobin 11.6 Wong crit 36.9.  He received Dilaudid for pain control.  X-ray performed.  I reviewed the x-rays and agree with the radiologist report that showed bibasilar reticular  nodular densities involving the right upper lobe as well infectious/inflammatory/neoplastic process cannot be excluded.  No pneumothorax.  No focal consolidation.  When compared to radiograph of 3/14/2024 placement of the stent is noted.  With reticular nodular density a concern the patient may have suffered aspiration pneumonitis especially with his O2 sats initially being 98% with the persistent episodes of emesis and coughing.  Because of his intractable emesis as well he will need continued fluids.  Hospital service will be contacted for admission.    Patient was admitted for further care.                            Medical Decision Making      Disposition and Plan     Clinical Impression:  1. Aspiration pneumonitis (HCC)    2. Intractable vomiting    3. Malignant neoplasm of esophagus, unspecified location (HCC)         Disposition:  Admit  4/8/2024 12:49 pm    Follow-up:  No follow-up provider specified.        Medications Prescribed:  Current Discharge Medication List                            Hospital Problems       Present on Admission  Date Reviewed: 3/26/2024            ICD-10-CM Noted POA    * (Principal) Aspiration pneumonitis (HCC) J69.0 3/6/2024 Unknown    Anemia D64.9 4/8/2024 Yes    Intractable vomiting R11.10 12/16/2023 Unknown    Malignant neoplasm of esophagus, unspecified location (HCC) C15.9 12/17/2023 Unknown

## 2024-04-08 NOTE — PROGRESS NOTES
NURSING ADMISSION NOTE      Patient admitted via Ambulatory  Oriented to room.  Safety precautions initiated.  Bed in low position.  Call light in reach.

## 2024-04-08 NOTE — ED QUICK NOTES
Orders for admission, patient is aware of plan and ready to go upstairs. Any questions, please call ED RN Roxanne at extension [phone].     Patient Covid vaccination status: Fully vaccinated and immunocompromised     COVID Test Ordered in ED: None    COVID Suspicion at Admission: N/A    Running Infusions:  bag 2/2 of fluid bolus    Mental Status/LOC at time of transport: a/o x4    Other pertinent information:   CIWA score: N/A   NIH score:  N/A

## 2024-04-08 NOTE — H&P
[facility]     Dotnae Cortez Jr. Patient Status:  Emergency    10/15/1970 MRN [id number]   Location [facility] EMERGENCY DEPARTMENT Attending Hudson Gan MD   Hosp Day # 0 PCP Adrian Horowitz MD     Chief Complaint: Patient presents with chest pain vomiting fevers    Subjective:    History of Present Illness:     Dontae Cortez Jr. is a 53 year old male with history of metastatic esophageal adenocarcinoma with metastasis to the liver bowel pancreatic head status post esophagogastrectomy with gastric pull-through and jejunostomy tube placement 2 years ago.  Patient has had multiple complications including esophageal leak with a soft focal bronchial fistula esophageal stent placement right main stent placement via bronchoscopy status post chemo.  Patient has history hypertension hyperlipidemia CAD status post CABG right shoulder arthroscopy distal clavicle resection history of C. difficile.  Patient is status post EGD on  showing malposition of a ASD occluder that was removed and esophageal stent was placed.  Video swallow still showed concern for fistula or leak and TPN was initiated.  Patient was discharged from a rhinovirus day on  with plans to have J-tube placement by Dr. Ritchie at [facility] on .    History/Other:    Past Medical History:  Past Medical History:   Diagnosis Date    Back problem     Belching 2022    Black stools 2022    Borderline diabetes     Dx in 2013 - HgA1C 6.2%    C. difficile diarrhea 10/23/2022    pt treated and without symptoms    Chest pain 2022    Coronary artery disease     On 13: CABG x 4 with LIMA to LAD and SVG to diagonal, OM and PDA    Decorative tattoo 2000    Depression     Difficult intubation     h/o esophagectomy for CA; developed esophagobronchial vistula    Disorder of liver     LIVER CA    Esophageal cancer (HCC)     completed chemo    Esophageal reflux      Essential hypertension     Exposure to medical diagnostic radiation     last tx 8/18/2022    Frequent urination 01/01/2013    Gastroparesis     Heartburn 02/01/2022    High blood pressure     High cholesterol 08/01/2013    Found when I had quadruple bypass    History of COVID-19 10/16/2022    asymptomatic - pt was dx during a hospitalization for another diagnosis. No continued symptoms    History of stomach ulcers     Hyperlipidemia     Hyperlipidemia LDL goal < 70     Indigestion 02/01/2022    Morbid obesity with BMI of 40.0-44.9, adult (HCC)     Muscle weakness     Nausea vomiting and diarrhea 03/14/2024    Nontoxic multinodular goiter     Dx in 8/2013: pt was told that imaging showed thyroid cysts per PCP    Pancreatic cancer (HCC)     last dose 12/4/2023 is scheduled for another round 12/27/23    Peripheral vascular disease (HCC)     pt denies    Personal history of antineoplastic chemotherapy     for esophageal cancer/completed    Personal history of antineoplastic chemotherapy 12/2023    pancreatic cancer    Personal history of antineoplastic chemotherapy     Last treatment 3/12/24    Problems with swallowing 02/01/2022    Pulmonary nodules     Dx in 8/2013: CT chest showed small bilateral fissural-based lung nodules less than 1 cm    S/P CABG x 4     On 8/16/13: CABG x 4 with LIMA to LAD and SVG to diagonal, OM and PDA    Shortness of breath     when coughing; no oxygen    Vomiting 02/01/2022     Past Surgical History:   Past Surgical History:   Procedure Laterality Date    APPENDECTOMY      APPENDECTOMY      ARTHROSCOPY OF JOINT UNLISTED      right shoulder    CABG      On 8/16/13: CABG x 4 with LIMA to LAD and SVG to diagonal, OM and PDA    CARDIAC CATH LAB      On 8/14/2013: cardiac cath showed 3-vessel disease    OTHER SURGICAL HISTORY      1.       Laparoscopic robotic-assisted esophagogastrectomy.    PORT, INDWELLING, IMP        Family History:   Family History   Problem Relation Age of Onset    Cancer  Mother         breast and colon     Diabetes Neg      Social History:    reports that he quit smoking about 10 years ago. His smoking use included cigarettes. He has a 27 pack-year smoking history. He has never used smokeless tobacco. He reports that he does not currently use alcohol. He reports that he does not use drugs.     Allergies: No Known Allergies    Medications:    Current Facility-Administered Medications on File Prior to Encounter   Medication Dose Route Frequency Provider Last Rate Last Admin    [COMPLETED] ondansetron (Zofran) 16 mg, dexAMETHasone (Decadron) 20 mg in sodium chloride 0.9% 110 mL IVPB   Intravenous Once Katie Conner APRN   Stopped at 03/25/24 1205    [COMPLETED] nivolumab (Opdivo) 240 mg in sodium chloride 0.9% 124 mL IVPB  240 mg Intravenous Once Katie Conner APRN   Stopped at 03/25/24 1111    [COMPLETED] trastuzumab-qyyp (Trazimera) 336 mg in sodium chloride 0.9% 266 mL infusion  4 mg/kg (Treatment Plan Recorded) Intravenous Once Katie Conner APRN   Stopped at 03/25/24 1147    [COMPLETED] oxaliplatin (Eloxatin) 155 mg in dextrose 5% 281 mL infusion  75 mg/m2 (Treatment Plan Recorded) Intravenous Once Katie Conner APRN   Stopped at 03/25/24 1326    [COMPLETED] leucovorin 850 mg in dextrose 5% 250 mL infusion  400 mg/m2 (Treatment Plan Recorded) Intravenous Once Katie Conner APRN   Stopped at 03/25/24 1326    [COMPLETED] methylPREDNISolone sodium succinate (Solu-MEDROL) injection 125 mg  125 mg Intravenous Once Grecia Angelo APRN   125 mg at 03/25/24 1328    [COMPLETED] famotidine (Pepcid) 20 mg/2mL injection 20 mg  20 mg Intravenous Once Grecia Angelo APRN   20 mg at 03/25/24 1330    [COMPLETED] loperamide (Imodium) cap 4 mg  4 mg Oral Once Grecia Angelo APRN   4 mg at 03/25/24 1335    [COMPLETED] fentaNYL (Sublimaze) 50 mcg/mL injection             [COMPLETED] morphINE PF 2 MG/ML injection 2 mg  2 mg Intravenous Once Adilia Talley MD   2 mg at  03/15/24 0802    [COMPLETED] sodium chloride 0.9 % IV bolus 1,000 mL  1,000 mL Intravenous Once Lary Bernstein MD 1,000 mL/hr at 24 1803 1,000 mL at 24 1803    [COMPLETED] metoclopramide (Reglan) 5 mg/mL injection 10 mg  10 mg Intravenous Once Lary Bernstein MD   10 mg at 24    [COMPLETED] pantoprazole (Protonix) 40 mg in sodium chloride 0.9% PF 10 mL IV push  40 mg Intravenous Once Lary Bernstein MD   40 mg at 24    [COMPLETED] HYDROmorphone (Dilaudid) 1 MG/ML injection 0.5 mg  0.5 mg Intravenous Q30 Min PRN Glen Myles MD   0.5 mg at 24    [COMPLETED] ondansetron (Zofran) 4 MG/2ML injection 4 mg  4 mg Intravenous Once Lary Bernstein MD   4 mg at 24    [] sodium chloride 0.9% infusion   Intravenous Continuous Lary Bernstein MD   Stopped at 24 2100    [] HYDROmorphone (Dilaudid) 1 MG/ML injection 0.5 mg  0.5 mg Intravenous Q30 Min PRN Lary Bernstein MD        [COMPLETED] ipratropium-albuterol (Duoneb) 0.5-2.5 (3) MG/3ML inhalation solution 3 mL  3 mL Nebulization Once Lary Bernstein MD   3 mL at 24    [COMPLETED] ceFAZolin (Ancef) 2 g in 20mL IV syringe premix  2 g Intravenous Once Lary Bernstein MD   2 g at 24    [COMPLETED] metRONIDAZOLE in sodium chloride 0.79% (Flagyl) 5 mg/mL IVPB premix 500 mg  500 mg Intravenous Once Lary Bernstein MD   Stopped at 24    [COMPLETED] iopamidol 76% (ISOVUE-370) injection for power injector  80 mL Intravenous ONCE PRN Nixon Vergara MD   80 mL at 24 2228    [COMPLETED] morphINE PF 2 MG/ML injection 1 mg  1 mg Intravenous Once Roxanne Fitch DO   1 mg at 24 2322    [COMPLETED] ondansetron (Zofran) 16 mg, dexAMETHasone (Decadron) 20 mg in sodium chloride 0.9% 110 mL IVPB   Intravenous Once Cristina Brown APRN   Stopped at 24 1143    [COMPLETED] nivolumab (Opdivo) 240 mg in sodium  chloride 0.9% 124 mL IVPB  240 mg Intravenous Once Cristina Brown APRN   Stopped at 03/12/24 1123    [COMPLETED] trastuzumab-qyyp (Trazimera) 336 mg in sodium chloride 0.9% 266 mL infusion  4 mg/kg (Treatment Plan Recorded) Intravenous Once Cristina Brown APRN   Stopped at 03/12/24 1219    [COMPLETED] oxaliplatin (Eloxatin) 155 mg in dextrose 5% 281 mL infusion  75 mg/m2 (Treatment Plan Recorded) Intravenous Once Cristina Brown APRN   Stopped at 03/12/24 1427    [COMPLETED] leucovorin 850 mg in dextrose 5% 250 mL infusion  400 mg/m2 (Treatment Plan Recorded) Intravenous Once Cristina Brown APRN   Stopped at 03/12/24 1427    [COMPLETED] iopamidol 76% (ISOVUE-370) injection for power injector  80 mL Intravenous ONCE PRN Raheel Ritchie MD   80 mL at 03/06/24 1650    [COMPLETED] heparin (Porcine) 100 Units/mL lock flush 500 Units  5 mL Intravenous Once Raheel Ritchie MD   500 Units at 03/06/24 1655    [COMPLETED] HYDROmorphone (Dilaudid) 1 MG/ML injection 0.5 mg  0.5 mg Intravenous Q30 Min PRN Cat Burt MD   0.5 mg at 03/06/24 2347    [COMPLETED] ondansetron (Zofran) 4 MG/2ML injection 4 mg  4 mg Intravenous Once Cat Burt MD   4 mg at 03/06/24 2123    [COMPLETED] sodium chloride 0.9 % IV bolus 1,000 mL  1,000 mL Intravenous Once Cat Burt MD   Stopped at 03/06/24 2302    [COMPLETED] piperacillin-tazobactam (Zosyn) 4.5 g in dextrose 5% 100 mL IVPB-ADDV  4.5 g Intravenous Once Cat Burt MD   Stopped at 03/06/24 2250    [COMPLETED] ondansetron (Zofran) 16 mg, dexAMETHasone (Decadron) 20 mg in sodium chloride 0.9% 110 mL IVPB   Intravenous Once Katie Conner APRN   Stopped at 02/22/24 1204    [COMPLETED] nivolumab (Opdivo) 240 mg in sodium chloride 0.9% 124 mL IVPB  240 mg Intravenous Once Katie Conner APRN   Stopped at 02/22/24 1103    [COMPLETED] trastuzumab-qyyp (Trazimera) 336 mg in sodium chloride 0.9% 266 mL infusion  4 mg/kg (Treatment Plan Recorded)  Intravenous Once Katie Conner APRN   Stopped at 02/22/24 1144    [COMPLETED] oxaliplatin (Eloxatin) 155 mg in dextrose 5% 281 mL infusion  75 mg/m2 (Treatment Plan Recorded) Intravenous Once Katie Conner APRN   Stopped at 02/22/24 1415    [COMPLETED] leucovorin 850 mg in dextrose 5% 250 mL infusion  400 mg/m2 (Treatment Plan Recorded) Intravenous Once Katie Conner APRN   Stopped at 02/22/24 1415    [COMPLETED] ondansetron (Zofran) 16 mg, dexAMETHasone (Decadron) 20 mg in sodium chloride 0.9% 110 mL IVPB   Intravenous Once Senia Foster MD   Stopped at 02/05/24 1141    [COMPLETED] trastuzumab-qyyp (Trazimera) 336 mg in sodium chloride 0.9% 266 mL infusion  4 mg/kg (Treatment Plan Recorded) Intravenous Once Senia Foster MD   Stopped at 02/05/24 1217    [COMPLETED] oxaliplatin (Eloxatin) 155 mg in dextrose 5% 281 mL infusion  75 mg/m2 (Treatment Plan Recorded) Intravenous Once Senia Foster MD   Stopped at 02/05/24 1432    [COMPLETED] leucovorin 850 mg in dextrose 5% 250 mL infusion  400 mg/m2 (Treatment Plan Recorded) Intravenous Once Senia Foster MD   Stopped at 02/05/24 1432    [COMPLETED] morphINE PF 4 MG/ML injection 4 mg  4 mg Intravenous Once Jose E Chung MD   4 mg at 01/29/24 0447    [COMPLETED] ondansetron (Zofran) 4 MG/2ML injection 4 mg  4 mg Intravenous Once Jose E Chung MD   4 mg at 01/29/24 0447    [COMPLETED] morphINE PF 4 MG/ML injection 4 mg  4 mg Intravenous Once Jose E Chung MD   4 mg at 01/29/24 0647    [COMPLETED] iopamidol 76% (ISOVUE-370) injection for power injector  75 mL Intravenous ONCE PRN Jose Roberto Myles DO   75 mL at 01/29/24 1148    [COMPLETED] ceFAZolin (Ancef) 2 g in 20mL IV syringe premix  2 g Intravenous On Call Vasquez Alexis MD   1 g at 01/25/24 1250    [COMPLETED] mupirocin (Bactroban) 2% nasal ointment             [COMPLETED] acetaminophen (Tylenol Extra Strength) tab 1,000 mg  1,000 mg Oral Once PRN Fátima Gallagher MD        Or     [COMPLETED] HYDROcodone-acetaminophen (Norco) 5-325 MG per tab 1 tablet  1 tablet Oral Once PRN Fátima Gallagher MD   1 tablet at 24 1547    Or    [COMPLETED] HYDROcodone-acetaminophen (Norco) 5-325 MG per tab 2 tablet  2 tablet Oral Once PRN Fátima Gallagher MD        [COMPLETED] prochlorperazine (Compazine) 10 MG/2ML injection             [COMPLETED] heparin (Porcine) 100 Units/mL lock flush 500 Units  5 mL Intracatheter Once Vasquez Alexis MD   500 Units at 24 1605    [COMPLETED] pembrolizumab (Keytruda) 400 mg in sodium chloride 0.9% 116 mL IVPB  400 mg Intravenous Once Grecia Angelo APRN   Stopped at 24 1016    [COMPLETED] trastuzumab-qyyp (Trazimera) 336 mg in sodium chloride 0.9% 266 mL infusion  4 mg/kg (Treatment Plan Recorded) Intravenous Once Grecia Angeol APRN   Stopped at 24 1054    [COMPLETED] ondansetron (Zofran) 4 MG/2ML injection             [COMPLETED] HYDROmorphone (Dilaudid) 1 MG/ML injection             [COMPLETED] heparin (Porcine) 100 Units/mL lock flush 500 Units  5 mL Intracatheter Once Charles Maguire MD   500 Units at 24 1104    [COMPLETED] HYDROmorphone (Dilaudid) 1 MG/ML injection             [] naloxone (Narcan) 0.4 MG/ML injection 0.08 mg  0.08 mg Intravenous Once PRN Junaid Evangelista,         [] sodium chloride 0.9% infusion   Intravenous Continuous JoseE Chung MD   New Bag at 24 1002     Current Outpatient Medications on File Prior to Encounter   Medication Sig Dispense Refill    OLANZapine 5 MG Oral Tab Take 1 tablet (5 mg total) by mouth nightly. 30 tablet 3    morphINE ER 15 MG Oral Tab CR Take 1 tablet (15 mg total) by mouth every 12 (twelve) hours. 60 tablet 0    metoclopramide (REGLAN) 10 MG Oral Tab Take 1 tablet (10 mg total) by mouth 4 (four) times daily before meals and nightly. 120 tablet 2    HYDROcodone-acetaminophen 5-325 MG Oral Tab Take 1 tablet by mouth every 4 (four) hours as needed for Pain. 60 tablet 0     Pancrelipase, Lip-Prot-Amyl, (CREON) 04308-47880 units Oral Cap DR Particles Take 2 capsules with meals and 1 capsule with snacks 240 capsule 0    gabapentin 300 MG Oral Cap Take 1 capsule (300 mg total) by mouth in the morning and 1 capsule (300 mg total) before bedtime. 180 capsule 0    benzonatate 100 MG Oral Cap Take 1 capsule (100 mg total) by mouth 3 (three) times daily as needed for cough. 90 capsule 0    sertraline 100 MG Oral Tab Take 2 tablets (200 mg total) by mouth daily.      losartan 50 MG Oral Tab Take 1 tablet (50 mg total) by mouth daily. 90 tablet 2    Omeprazole 40 MG Oral Capsule Delayed Release Take 1 capsule (40 mg total) by mouth 2 (two) times daily before meals. 90 capsule 3    metoprolol succinate ER 50 MG Oral Tablet 24 Hr TAKE 1 TABLET BY MOUTH EVERY DAY 90 tablet 1       Review of Systems:   A comprehensive review of systems was completed.    Pertinent positives and negatives noted in the HPI.    Objective:   Physical Exam:    /88   Pulse 68   Temp 97.2 °F (36.2 °C) (Temporal)   Resp 15   Ht 6' 2\" (1.88 m)   Wt 150 lb (68 kg)   SpO2 93%   BMI 19.26 kg/m²   General: No acute distress, Alert  Respiratory: No rhonchi, no wheezes  Cardiovascular: S1, S2. Regular rate and rhythm  Abdomen: Soft, Non-tender, non-distended, positive bowel sounds  Neuro: No new focal deficits  Extremities: No edema      Results:    Labs:      Labs Last 24 Hours:    Recent Labs   Lab 04/08/24  1038   RBC 4.02*   HGB 11.6*   HCT 36.9*   MCV 91.8   MCH 28.9   MCHC 31.4   RDW 16.0   NEPRELIM 7.03   WBC 9.0   .0       Recent Labs   Lab 04/08/24  1038   *   BUN 23   CREATSERUM 0.71   EGFRCR 109   CA 9.5   ALB 3.0*      K 4.4      CO2 30.0   ALKPHO 83   AST 14*   ALT 16   BILT 0.5   TP 7.6       Lab Results   Component Value Date    PT 20.4 (H) 01/30/2014    PT 22.9 (H) 01/13/2014    PT 25.7 (H) 01/08/2014    INR 1.21 (H) 03/15/2024    INR 1.26 (H) 01/25/2024    INR 1.20 11/27/2023        Recent Labs   Lab 04/08/24  1038   TROPHS 11       No results for input(s): \"TROP\", \"PBNP\" in the last 168 hours.    No results for input(s): \"PCT\" in the last 168 hours.    Imaging: Imaging data reviewed in Epic.    Assessment & Plan:      # 53 years old man with history of esophageal CA and a ASD closure device mild placement and removal  -Lower third of the esophagus with fistula  -Stent placed at [place]    # Aspiration pneumonia    # Anemia with hemoglobin 11.6 and lymphopenia    #Post surgical changes of a gastric pull-through as per history with placement of a stent projecting over the mid to lower mediastinum.  The right hemidiaphragm remains flattened with blunting of the right costophrenic angle.  Bibasilar reticular   nodular densities also involve the right upper lobe, infectious/inflammatory/neoplastic process cannot be excluded, correlate clinically.  No new focal consolidation or pneumothorax.     #Dysphagia for liquids only  -scheduled for outpt J T            Plan of care discussed with patient and ED physician    Rochelle Tomlinson MD    Supplementary Documentation:     The 21st Century Cures Act makes medical notes like these available to patients in the interest of transparency. Please be advised this is a medical document. Medical documents are intended to carry relevant information, facts as evident, and the clinical opinion of the practitioner. The medical note is intended as peer to peer communication and may appear blunt or direct. It is written in medical language and may contain abbreviations or verbiage that are unfamiliar.

## 2024-04-09 ENCOUNTER — APPOINTMENT (OUTPATIENT)
Dept: GENERAL RADIOLOGY | Facility: HOSPITAL | Age: 54
End: 2024-04-09
Attending: INTERNAL MEDICINE
Payer: COMMERCIAL

## 2024-04-09 ENCOUNTER — ANESTHESIA (OUTPATIENT)
Dept: ENDOSCOPY | Facility: HOSPITAL | Age: 54
End: 2024-04-09
Payer: COMMERCIAL

## 2024-04-09 ENCOUNTER — ANESTHESIA EVENT (OUTPATIENT)
Dept: ENDOSCOPY | Facility: HOSPITAL | Age: 54
End: 2024-04-09
Payer: COMMERCIAL

## 2024-04-09 PROBLEM — R05.1 ACUTE COUGH: Status: ACTIVE | Noted: 2024-01-01

## 2024-04-09 PROBLEM — L98.8 FISTULA: Status: ACTIVE | Noted: 2024-04-09

## 2024-04-09 PROBLEM — L98.8 FISTULA: Status: ACTIVE | Noted: 2024-01-01

## 2024-04-09 PROBLEM — R05.1 ACUTE COUGH: Status: ACTIVE | Noted: 2024-04-09

## 2024-04-09 LAB
ALBUMIN SERPL-MCNC: 2.5 G/DL (ref 3.4–5)
ALBUMIN/GLOB SERPL: 0.6 {RATIO} (ref 1–2)
ALP LIVER SERPL-CCNC: 70 U/L
ALT SERPL-CCNC: 12 U/L
ANION GAP SERPL CALC-SCNC: 3 MMOL/L (ref 0–18)
AST SERPL-CCNC: 12 U/L (ref 15–37)
BASOPHILS # BLD AUTO: 0.05 X10(3) UL (ref 0–0.2)
BASOPHILS NFR BLD AUTO: 0.9 %
BILIRUB SERPL-MCNC: 0.6 MG/DL (ref 0.1–2)
BUN BLD-MCNC: 14 MG/DL (ref 9–23)
CALCIUM BLD-MCNC: 8.9 MG/DL (ref 8.5–10.1)
CHLORIDE SERPL-SCNC: 107 MMOL/L (ref 98–112)
CO2 SERPL-SCNC: 29 MMOL/L (ref 21–32)
CREAT BLD-MCNC: 0.58 MG/DL
EGFRCR SERPLBLD CKD-EPI 2021: 116 ML/MIN/1.73M2 (ref 60–?)
EOSINOPHIL # BLD AUTO: 0.17 X10(3) UL (ref 0–0.7)
EOSINOPHIL NFR BLD AUTO: 3.1 %
ERYTHROCYTE [DISTWIDTH] IN BLOOD BY AUTOMATED COUNT: 16 %
GLOBULIN PLAS-MCNC: 3.9 G/DL (ref 2.8–4.4)
GLUCOSE BLD-MCNC: 95 MG/DL (ref 70–99)
HCT VFR BLD AUTO: 31.6 %
HGB BLD-MCNC: 9.6 G/DL
IMM GRANULOCYTES # BLD AUTO: 0.04 X10(3) UL (ref 0–1)
IMM GRANULOCYTES NFR BLD: 0.7 %
IRON SATN MFR SERPL: 10 %
IRON SERPL-MCNC: 20 UG/DL
LYMPHOCYTES # BLD AUTO: 0.61 X10(3) UL (ref 1–4)
LYMPHOCYTES NFR BLD AUTO: 11 %
MCH RBC QN AUTO: 28.5 PG (ref 26–34)
MCHC RBC AUTO-ENTMCNC: 30.4 G/DL (ref 31–37)
MCV RBC AUTO: 93.8 FL
MONOCYTES # BLD AUTO: 0.69 X10(3) UL (ref 0.1–1)
MONOCYTES NFR BLD AUTO: 12.4 %
NEUTROPHILS # BLD AUTO: 4 X10 (3) UL (ref 1.5–7.7)
NEUTROPHILS # BLD AUTO: 4 X10(3) UL (ref 1.5–7.7)
NEUTROPHILS NFR BLD AUTO: 71.9 %
OSMOLALITY SERPL CALC.SUM OF ELEC: 288 MOSM/KG (ref 275–295)
PLATELET # BLD AUTO: 166 10(3)UL (ref 150–450)
POTASSIUM SERPL-SCNC: 4.1 MMOL/L (ref 3.5–5.1)
PROT SERPL-MCNC: 6.4 G/DL (ref 6.4–8.2)
RBC # BLD AUTO: 3.37 X10(6)UL
SODIUM SERPL-SCNC: 139 MMOL/L (ref 136–145)
TIBC SERPL-MCNC: 210 UG/DL (ref 240–450)
TRANSFERRIN SERPL-MCNC: 141 MG/DL (ref 200–360)
WBC # BLD AUTO: 5.6 X10(3) UL (ref 4–11)

## 2024-04-09 PROCEDURE — 0DHA8UZ INSERTION OF FEEDING DEVICE INTO JEJUNUM, VIA NATURAL OR ARTIFICIAL OPENING ENDOSCOPIC: ICD-10-PCS | Performed by: INTERNAL MEDICINE

## 2024-04-09 PROCEDURE — 99233 SBSQ HOSP IP/OBS HIGH 50: CPT | Performed by: INTERNAL MEDICINE

## 2024-04-09 PROCEDURE — 71045 X-RAY EXAM CHEST 1 VIEW: CPT | Performed by: INTERNAL MEDICINE

## 2024-04-09 PROCEDURE — 0DP58DZ REMOVAL OF INTRALUMINAL DEVICE FROM ESOPHAGUS, VIA NATURAL OR ARTIFICIAL OPENING ENDOSCOPIC: ICD-10-PCS | Performed by: INTERNAL MEDICINE

## 2024-04-09 PROCEDURE — 74360 X-RAY GUIDE GI DILATION: CPT | Performed by: INTERNAL MEDICINE

## 2024-04-09 PROCEDURE — 0DC58ZZ EXTIRPATION OF MATTER FROM ESOPHAGUS, VIA NATURAL OR ARTIFICIAL OPENING ENDOSCOPIC: ICD-10-PCS | Performed by: INTERNAL MEDICINE

## 2024-04-09 PROCEDURE — 0D758DZ DILATION OF ESOPHAGUS WITH INTRALUMINAL DEVICE, VIA NATURAL OR ARTIFICIAL OPENING ENDOSCOPIC: ICD-10-PCS | Performed by: INTERNAL MEDICINE

## 2024-04-09 DEVICE — PERCUTANEOUS ENDOSCOPIC GASTROSTOMY KIT
Type: IMPLANTABLE DEVICE | Status: FUNCTIONAL
Brand: ENDOVIVE SAFETY PEG KIT

## 2024-04-09 DEVICE — STENT SYSTEM
Type: IMPLANTABLE DEVICE | Status: FUNCTIONAL
Brand: WALLFLEX™ ESOPHAGEAL

## 2024-04-09 RX ORDER — GABAPENTIN 300 MG/1
300 CAPSULE ORAL 2 TIMES DAILY
Status: DISCONTINUED | OUTPATIENT
Start: 2024-04-09 | End: 2024-04-11

## 2024-04-09 RX ORDER — HYDRALAZINE HYDROCHLORIDE 20 MG/ML
INJECTION INTRAMUSCULAR; INTRAVENOUS
Status: COMPLETED
Start: 2024-04-09 | End: 2024-04-09

## 2024-04-09 RX ORDER — MORPHINE SULFATE 15 MG/1
15 TABLET, FILM COATED, EXTENDED RELEASE ORAL EVERY 12 HOURS SCHEDULED
Status: DISCONTINUED | OUTPATIENT
Start: 2024-04-09 | End: 2024-04-11

## 2024-04-09 RX ORDER — MIDAZOLAM HYDROCHLORIDE 1 MG/ML
1 INJECTION INTRAMUSCULAR; INTRAVENOUS EVERY 5 MIN PRN
Status: DISCONTINUED | OUTPATIENT
Start: 2024-04-09 | End: 2024-04-09 | Stop reason: HOSPADM

## 2024-04-09 RX ORDER — METOPROLOL SUCCINATE 50 MG/1
50 TABLET, EXTENDED RELEASE ORAL DAILY
Status: DISCONTINUED | OUTPATIENT
Start: 2024-04-09 | End: 2024-04-11

## 2024-04-09 RX ORDER — CEFAZOLIN SODIUM/WATER 2 G/20 ML
2 SYRINGE (ML) INTRAVENOUS
Status: COMPLETED | OUTPATIENT
Start: 2024-04-09 | End: 2024-04-09

## 2024-04-09 RX ORDER — PROCHLORPERAZINE EDISYLATE 5 MG/ML
5 INJECTION INTRAMUSCULAR; INTRAVENOUS EVERY 8 HOURS PRN
Status: DISCONTINUED | OUTPATIENT
Start: 2024-04-09 | End: 2024-04-09 | Stop reason: HOSPADM

## 2024-04-09 RX ORDER — ROCURONIUM BROMIDE 10 MG/ML
INJECTION, SOLUTION INTRAVENOUS AS NEEDED
Status: DISCONTINUED | OUTPATIENT
Start: 2024-04-09 | End: 2024-04-09 | Stop reason: SURG

## 2024-04-09 RX ORDER — DEXAMETHASONE SODIUM PHOSPHATE 4 MG/ML
VIAL (ML) INJECTION AS NEEDED
Status: DISCONTINUED | OUTPATIENT
Start: 2024-04-09 | End: 2024-04-09 | Stop reason: SURG

## 2024-04-09 RX ORDER — ONDANSETRON 2 MG/ML
4 INJECTION INTRAMUSCULAR; INTRAVENOUS EVERY 6 HOURS PRN
Status: DISCONTINUED | OUTPATIENT
Start: 2024-04-09 | End: 2024-04-09 | Stop reason: HOSPADM

## 2024-04-09 RX ORDER — HYDROMORPHONE HYDROCHLORIDE 1 MG/ML
0.6 INJECTION, SOLUTION INTRAMUSCULAR; INTRAVENOUS; SUBCUTANEOUS EVERY 5 MIN PRN
Status: DISCONTINUED | OUTPATIENT
Start: 2024-04-09 | End: 2024-04-09 | Stop reason: HOSPADM

## 2024-04-09 RX ORDER — HYDRALAZINE HYDROCHLORIDE 20 MG/ML
10 INJECTION INTRAMUSCULAR; INTRAVENOUS ONCE
Status: COMPLETED | OUTPATIENT
Start: 2024-04-09 | End: 2024-04-09

## 2024-04-09 RX ORDER — NALOXONE HYDROCHLORIDE 0.4 MG/ML
0.08 INJECTION, SOLUTION INTRAMUSCULAR; INTRAVENOUS; SUBCUTANEOUS AS NEEDED
Status: DISCONTINUED | OUTPATIENT
Start: 2024-04-09 | End: 2024-04-09 | Stop reason: HOSPADM

## 2024-04-09 RX ORDER — HYDROMORPHONE HYDROCHLORIDE 1 MG/ML
0.4 INJECTION, SOLUTION INTRAMUSCULAR; INTRAVENOUS; SUBCUTANEOUS EVERY 5 MIN PRN
Status: DISCONTINUED | OUTPATIENT
Start: 2024-04-09 | End: 2024-04-09 | Stop reason: HOSPADM

## 2024-04-09 RX ORDER — MEPERIDINE HYDROCHLORIDE 25 MG/ML
12.5 INJECTION INTRAMUSCULAR; INTRAVENOUS; SUBCUTANEOUS AS NEEDED
Status: DISCONTINUED | OUTPATIENT
Start: 2024-04-09 | End: 2024-04-09 | Stop reason: HOSPADM

## 2024-04-09 RX ORDER — PANTOPRAZOLE SODIUM 40 MG/1
40 TABLET, DELAYED RELEASE ORAL
Status: DISCONTINUED | OUTPATIENT
Start: 2024-04-10 | End: 2024-04-11

## 2024-04-09 RX ORDER — HYDROCODONE BITARTRATE AND ACETAMINOPHEN 5; 325 MG/1; MG/1
1 TABLET ORAL EVERY 4 HOURS PRN
Status: DISCONTINUED | OUTPATIENT
Start: 2024-04-09 | End: 2024-04-11

## 2024-04-09 RX ORDER — SODIUM CHLORIDE, SODIUM LACTATE, POTASSIUM CHLORIDE, CALCIUM CHLORIDE 600; 310; 30; 20 MG/100ML; MG/100ML; MG/100ML; MG/100ML
INJECTION, SOLUTION INTRAVENOUS CONTINUOUS
Status: DISCONTINUED | OUTPATIENT
Start: 2024-04-09 | End: 2024-04-09 | Stop reason: HOSPADM

## 2024-04-09 RX ORDER — SERTRALINE HYDROCHLORIDE 100 MG/1
200 TABLET, FILM COATED ORAL DAILY
Status: DISCONTINUED | OUTPATIENT
Start: 2024-04-09 | End: 2024-04-11

## 2024-04-09 RX ORDER — HYDRALAZINE HYDROCHLORIDE 20 MG/ML
10 INJECTION INTRAMUSCULAR; INTRAVENOUS ONCE
Status: CANCELLED | OUTPATIENT
Start: 2024-04-09 | End: 2024-04-09

## 2024-04-09 RX ORDER — OLANZAPINE 5 MG/1
5 TABLET ORAL NIGHTLY
Status: DISCONTINUED | OUTPATIENT
Start: 2024-04-09 | End: 2024-04-11

## 2024-04-09 RX ORDER — SODIUM CHLORIDE, SODIUM LACTATE, POTASSIUM CHLORIDE, CALCIUM CHLORIDE 600; 310; 30; 20 MG/100ML; MG/100ML; MG/100ML; MG/100ML
INJECTION, SOLUTION INTRAVENOUS CONTINUOUS PRN
Status: DISCONTINUED | OUTPATIENT
Start: 2024-04-09 | End: 2024-04-09 | Stop reason: SURG

## 2024-04-09 RX ORDER — HYDROMORPHONE HYDROCHLORIDE 1 MG/ML
0.2 INJECTION, SOLUTION INTRAMUSCULAR; INTRAVENOUS; SUBCUTANEOUS EVERY 5 MIN PRN
Status: DISCONTINUED | OUTPATIENT
Start: 2024-04-09 | End: 2024-04-09 | Stop reason: HOSPADM

## 2024-04-09 RX ORDER — HYDROMORPHONE HYDROCHLORIDE 1 MG/ML
INJECTION, SOLUTION INTRAMUSCULAR; INTRAVENOUS; SUBCUTANEOUS
Status: COMPLETED
Start: 2024-04-09 | End: 2024-04-09

## 2024-04-09 RX ORDER — LOSARTAN POTASSIUM 50 MG/1
50 TABLET ORAL DAILY
Status: DISCONTINUED | OUTPATIENT
Start: 2024-04-09 | End: 2024-04-11

## 2024-04-09 RX ORDER — ONDANSETRON 2 MG/ML
INJECTION INTRAMUSCULAR; INTRAVENOUS AS NEEDED
Status: DISCONTINUED | OUTPATIENT
Start: 2024-04-09 | End: 2024-04-09 | Stop reason: SURG

## 2024-04-09 RX ADMIN — DEXAMETHASONE SODIUM PHOSPHATE 4 MG: 4 MG/ML VIAL (ML) INJECTION at 14:11:00

## 2024-04-09 RX ADMIN — ROCURONIUM BROMIDE 50 MG: 10 INJECTION, SOLUTION INTRAVENOUS at 14:03:00

## 2024-04-09 RX ADMIN — SODIUM CHLORIDE, SODIUM LACTATE, POTASSIUM CHLORIDE, CALCIUM CHLORIDE: 600; 310; 30; 20 INJECTION, SOLUTION INTRAVENOUS at 13:58:00

## 2024-04-09 RX ADMIN — ONDANSETRON 4 MG: 2 INJECTION INTRAMUSCULAR; INTRAVENOUS at 14:39:00

## 2024-04-09 NOTE — ANESTHESIA PREPROCEDURE EVALUATION
PRE-OP EVALUATION    Patient Name: Dontae Cortez Jr.    Admit Diagnosis: Aspiration pneumonitis (HCC) [J69.0]  Intractable vomiting [R11.10]  Malignant neoplasm of esophagus, unspecified location (HCC) [C15.9]    Pre-op Diagnosis: dysphagia    ENTEROSCOPY with possible stent and PEJ tube placement    Anesthesia Procedure: ENTEROSCOPY with possible stent and PEJ tube placement    Surgeon(s) and Role:     * Raheel Ritchie MD - Primary    Pre-op vitals reviewed.  Temp: 97.6 °F (36.4 °C)  Pulse: 62  Resp: 18  BP: 160/83  SpO2: 98 %  Body mass index is 19.37 kg/m².    Current medications reviewed.  Hospital Medications:  • [COMPLETED] ceFAZolin (Ancef) 2 g in 20mL IV syringe premix  2 g Intravenous On Call to OR   • [COMPLETED] sodium chloride 0.9 % IV bolus 2,040 mL  30 mL/kg Intravenous Once   • [COMPLETED] ondansetron (Zofran) 4 MG/2ML injection 4 mg  4 mg Intravenous Once   • [] sodium chloride 0.9% infusion   Intravenous Continuous   • [] ondansetron (Zofran) 4 MG/2ML injection 4 mg  4 mg Intravenous Q4H PRN   • morphINE PF 2 MG/ML injection 0.5 mg  0.5 mg Intravenous Q2H PRN    Or   • morphINE PF 2 MG/ML injection 1 mg  1 mg Intravenous Q2H PRN    Or   • morphINE PF 2 MG/ML injection 2 mg  2 mg Intravenous Q2H PRN   • diphenhydrAMINE (Benadryl) 50 mg/mL  injection 12.5 mg  12.5 mg Intravenous Q4H PRN    Or   • diphenhydrAMINE (Benadryl) cap/tab 25 mg  25 mg Oral Q4H PRN   • sodium chloride 0.9% infusion   Intravenous Continuous   • heparin (Porcine) 100 Units/mL lock flush 500 Units  5 mL Intravenous PRN       Outpatient Medications:     Medications Prior to Admission   Medication Sig Dispense Refill Last Dose   • OLANZapine 5 MG Oral Tab Take 1 tablet (5 mg total) by mouth nightly. 30 tablet 3 Past Month   • morphINE ER 15 MG Oral Tab CR Take 1 tablet (15 mg total) by mouth every 12 (twelve) hours. 60 tablet 0 Past Month   • metoclopramide (REGLAN) 10 MG Oral Tab Take 1 tablet (10 mg  total) by mouth 4 (four) times daily before meals and nightly. 120 tablet 2 Past Month   • HYDROcodone-acetaminophen 5-325 MG Oral Tab Take 1 tablet by mouth every 4 (four) hours as needed for Pain. 60 tablet 0 Past Month   • Pancrelipase, Lip-Prot-Amyl, (CREON) 24700-89237 units Oral Cap DR Particles Take 2 capsules with meals and 1 capsule with snacks 240 capsule 0 Past Month   • gabapentin 300 MG Oral Cap Take 1 capsule (300 mg total) by mouth in the morning and 1 capsule (300 mg total) before bedtime. 180 capsule 0 Past Month   • benzonatate 100 MG Oral Cap Take 1 capsule (100 mg total) by mouth 3 (three) times daily as needed for cough. 90 capsule 0 Past Month   • sertraline 100 MG Oral Tab Take 2 tablets (200 mg total) by mouth daily.   Past Month   • losartan 50 MG Oral Tab Take 1 tablet (50 mg total) by mouth daily. 90 tablet 2 Past Month   • Omeprazole 40 MG Oral Capsule Delayed Release Take 1 capsule (40 mg total) by mouth 2 (two) times daily before meals. 90 capsule 3 Past Month   • metoprolol succinate ER 50 MG Oral Tablet 24 Hr TAKE 1 TABLET BY MOUTH EVERY DAY 90 tablet 1 Past Month       Allergies: Patient has no known allergies.      Anesthesia Evaluation    Patient summary reviewed.    Anesthetic Complications           GI/Hepatic/Renal      (+) GERD          (+) liver disease                 Cardiovascular                  (+) hypertension     (+) CAD                                Endo/Other                                  Pulmonary               (+) shortness of breath            Neuro/Psych      (+) depression                              Past Surgical History:   Procedure Laterality Date   • APPENDECTOMY     • APPENDECTOMY     • ARTHROSCOPY OF JOINT UNLISTED      right shoulder   • CABG      On 8/16/13: CABG x 4 with LIMA to LAD and SVG to diagonal, OM and PDA   • CARDIAC CATH LAB      On 8/14/2013: cardiac cath showed 3-vessel disease   • OTHER SURGICAL HISTORY      1.       Laparoscopic  robotic-assisted esophagogastrectomy.   • PORT, INDWELLING, IMP       Social History     Socioeconomic History   • Marital status:    • Number of children: 3   Occupational History   • Occupation: works as  - on workman's comp   Tobacco Use   • Smoking status: Former     Packs/day: 1.00     Years: 27.00     Additional pack years: 0.00     Total pack years: 27.00     Types: Cigarettes     Quit date: 8/15/2013     Years since quitting: 10.6   • Smokeless tobacco: Never   • Tobacco comments:     Quit smoking 2013   Vaping Use   • Vaping Use: Never used   Substance and Sexual Activity   • Alcohol use: Not Currently   • Drug use: Never   • Sexual activity: Not Currently     Partners: Female   Other Topics Concern   • Caffeine Concern Yes   • Stress Concern No   • Weight Concern No   • Special Diet No   • Exercise No   • Seat Belt No     History   Drug Use Unknown     Available pre-op labs reviewed.  Lab Results   Component Value Date    WBC 5.6 04/09/2024    RBC 3.37 (L) 04/09/2024    HGB 9.6 (L) 04/09/2024    HCT 31.6 (L) 04/09/2024    MCV 93.8 04/09/2024    MCH 28.5 04/09/2024    MCHC 30.4 (L) 04/09/2024    RDW 16.0 04/09/2024    .0 04/09/2024     Lab Results   Component Value Date     04/09/2024    K 4.1 04/09/2024     04/09/2024    CO2 29.0 04/09/2024    BUN 14 04/09/2024    CREATSERUM 0.58 (L) 04/09/2024    GLU 95 04/09/2024    CA 8.9 04/09/2024            Airway      Mallampati: II  Mouth opening: 3 FB  TM distance: 4 - 6 cm  Neck ROM: full Cardiovascular    Cardiovascular exam normal.    Rate: normal     Dental             Pulmonary    Pulmonary exam normal.                 Other findings          ASA: 3   Plan: general  NPO status verified and patient meets guidelines.          Plan/risks discussed with: patient and significant other            Present on Admission:  • Anemia  • Dyspnea and respiratory abnormalities

## 2024-04-09 NOTE — PLAN OF CARE
Pt A/O x4. VSS. Afebrile. Pt c/o pain throughout day. PRN morphine admin per MAR. Pt reports some relief w/ pain medication. Medications admin per MAR. Pt NPO this AM. Pt had stent placement along with J tube placement. Pt returned to room, J tube site CDI. Pt okay to have clears PO, and TF per order per MD. Fall and safety precautions in place. Call light within reach.

## 2024-04-09 NOTE — CONSULTS
Thoracic Surgery Consult Note     Name: Dontae Cortez Jr.   Age: 53 year old   Sex: male.   MRN: [id number]    Reason for Consultation: bronchoesophageal fistula     Consulting Physician: Dr. Madrid    Subjective:     Chief Complaint: \"I couldn't drink anything\"     History of Present Illness:   Mr. 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 Patient has undergone multiple esophageal stent placements and EGDs with fistula closure. EGD from 4/1/24 showed malposition of ASD occluder and was removed with placement of esophageal stent. Follow up UGI showed aspiration and concern for leak, so patient was discharged home on TPN and scheduled for outpatient J tube replacement on 4/11. At home, patient had a persistent cough with drinking any liquids. No dysphagia with solids. He also reports shortness of breath with coughing and progressive right anterior chest pain over the past few months. The pain is worse with deep breathing, cough, and laying on his right side. One febrile episode last week. He has lost 20 pounds over the past month. Given the worsening pain and cough, he presented to the ER on 4/8/24. CXR showed a small right effusion. He underwent EGD with esophageal stent replacement and J tube placement yesterday with Dr. Ritchie. His pain has since improved and he tolerated orange juice yesterday without cough.     Review Of Systems:   10 point review of systems was conducted and was negative except for the pertinent positives listed in the above HPI.    Past Medical History:   Past Medical History:   Diagnosis Date    Back problem     Belching 02/01/2022    Black stools 02/01/2022    Borderline diabetes     Dx in 8/2013 - HgA1C 6.2%    C. difficile diarrhea 10/23/2022    pt treated and without symptoms    Chest pain 02/01/2022    Coronary artery disease     On 8/16/13: CABG x 4 with LIMA to LAD  and SVG to diagonal, OM and PDA    Decorative tattoo 01/01/2000    Depression     Difficult intubation     h/o esophagectomy for CA; developed esophagobronchial vistula    Disorder of liver     LIVER CA    Esophageal cancer (HCC)     completed chemo    Esophageal reflux     Essential hypertension     Exposure to medical diagnostic radiation     last tx 8/18/2022    Frequent urination 01/01/2013    Gastroparesis     Heartburn 02/01/2022    High blood pressure     High cholesterol 08/01/2013    Found when I had quadruple bypass    History of COVID-19 10/16/2022    asymptomatic - pt was dx during a hospitalization for another diagnosis. No continued symptoms    History of stomach ulcers     Hyperlipidemia     Hyperlipidemia LDL goal < 70     Indigestion 02/01/2022    Morbid obesity with BMI of 40.0-44.9, adult (HCC)     Muscle weakness     Nausea vomiting and diarrhea 03/14/2024    Nontoxic multinodular goiter     Dx in 8/2013: pt was told that imaging showed thyroid cysts per PCP    Pancreatic cancer (HCC)     last dose 12/4/2023 is scheduled for another round 12/27/23    Peripheral vascular disease (HCC)     pt denies    Personal history of antineoplastic chemotherapy     for esophageal cancer/completed    Personal history of antineoplastic chemotherapy 12/2023    pancreatic cancer    Personal history of antineoplastic chemotherapy     Last treatment 3/12/24    Problems with swallowing 02/01/2022    Pulmonary nodules     Dx in 8/2013: CT chest showed small bilateral fissural-based lung nodules less than 1 cm    S/P CABG x 4     On 8/16/13: CABG x 4 with LIMA to LAD and SVG to diagonal, OM and PDA    Shortness of breath     when coughing; no oxygen    Vomiting 02/01/2022       Past Surgical History:   Past Surgical History:   Procedure Laterality Date    APPENDECTOMY      APPENDECTOMY      ARTHROSCOPY OF JOINT UNLISTED      right shoulder    CABG      On 8/16/13: CABG x 4 with LIMA to LAD and SVG to diagonal, OM and  PDA    CARDIAC CATH LAB      On 8/14/2013: cardiac cath showed 3-vessel disease    OTHER SURGICAL HISTORY      1.       Laparoscopic robotic-assisted esophagogastrectomy.    PORT, INDWELLING, IMP         Social History:   Social History     Socioeconomic History    Marital status:      Spouse name: Not on file    Number of children: 3    Years of education: Not on file    Highest education level: Not on file   Occupational History    Occupation: works as  - on workman's comp   Tobacco Use    Smoking status: Former     Packs/day: 1.00     Years: 27.00     Additional pack years: 0.00     Total pack years: 27.00     Types: Cigarettes     Quit date: 8/15/2013     Years since quitting: 10.6    Smokeless tobacco: Never    Tobacco comments:     Quit smoking 2013   Vaping Use    Vaping Use: Never used   Substance and Sexual Activity    Alcohol use: Not Currently    Drug use: Never    Sexual activity: Not Currently     Partners: Female   Other Topics Concern     Service Not Asked    Blood Transfusions Not Asked    Caffeine Concern Yes    Occupational Exposure Not Asked    Hobby Hazards Not Asked    Sleep Concern Not Asked    Stress Concern No    Weight Concern No    Special Diet No    Back Care Not Asked    Exercise No    Bike Helmet Not Asked    Seat Belt No    Self-Exams Not Asked   Social History Narrative    Lives with life partner    Has 3 daughters - 1 lives closeby, 1 in [place], 1 in [place]     Social Determinants of Health     Financial Resource Strain: Low Risk  (12/14/2023)    Financial Resource Strain     Difficulty of Paying Living Expenses: Not very hard     Med Affordability: No   Food Insecurity: No Food Insecurity (4/8/2024)    Food Insecurity     Food Insecurity: Never true   Transportation Needs: No Transportation Needs (4/8/2024)    Transportation Needs     Lack of Transportation: No   Physical Activity: Not on file   Stress: Not on file   Social Connections: Not on file   Housing  Stability: Low Risk  (4/8/2024)    Housing Stability     Housing Instability: No     Housing Instability Emergency: No       Family History:   Family History   Problem Relation Age of Onset    Cancer Mother         breast and colon     Diabetes Neg        Allergies:  No Known Allergies      Objective:      Vital Signs:  /75 (BP Location: Left arm)   Pulse 64   Temp 98.4 °F (36.9 °C) (Oral)   Resp 18   Ht 188 cm (6' 2\")   Wt 150 lb 14.4 oz (68.4 kg)   SpO2 91%   BMI 19.37 kg/m²     Physical Exam:  General: thin, well appearing male in no acute distress  HEENT: Normocephalic, PERRL, EOMI, no scleral icterus  Neck: Supple, trachea midline, no JVD, no masses. Thyroid not grossly enlarged  Nodes: no cervical or supraclavicular lymphadenopathy appreciated  Heart: regular rate and rhythm.   Lungs: Normal respiratory effort. Room air.   Abdomen: Soft, Non-tender, non-distended. J tube in place.   Extremities: No clubbing or cyanosis. No lateralizing weakness  Neuro: No gross cranial nerve defects, no loss of sensation  Psych:  oriented to person place and time, normal mood and affect      Labs:   Lab Results   Component Value Date/Time    WBC 5.6 04/09/2024 06:42 AM    HGB 9.6 (L) 04/09/2024 06:42 AM    HCT 31.6 (L) 04/09/2024 06:42 AM    .0 04/09/2024 06:42 AM    BAND 6 12/19/2022 09:04 AM    MCV 93.8 04/09/2024 06:42 AM     Lab Results   Component Value Date/Time     04/09/2024 06:42 AM    K 4.1 04/09/2024 06:42 AM     04/09/2024 06:42 AM    CO2 29.0 04/09/2024 06:42 AM    BUN 14 04/09/2024 06:42 AM    GLU 95 04/09/2024 06:42 AM    CA 8.9 04/09/2024 06:42 AM    MG 1.9 03/15/2024 06:43 AM    PHOS 2.9 03/15/2024 06:43 AM     Lab Results   Component Value Date/Time    INR 1.21 (H) 03/15/2024 06:43 AM    PT 20.4 (H) 01/30/2014 02:12 PM    PTT 32.7 01/25/2024 11:13 AM     No components found for: \"TROPI\"  Lab Results   Component Value Date/Time    ALB 2.5 (L) 04/09/2024 06:42 AM    TP 6.4  04/09/2024 06:42 AM    ALT 12 (L) 04/09/2024 06:42 AM    AST 12 (L) 04/09/2024 06:42 AM    SARA 31 10/16/2022 06:37 AM         Review of Data:   CXR 4/9/24  FINDINGS:  Cardiac size is stable.  There is an esophageal stent noted.  Stable power port with tip overlying the right atrial SVC junction.  Right-sided PICC is stable positioning.  Small right-sided pleural effusion right basilar  atelectasis/infiltrates.  Mild interstitial opacities.  No pneumothorax.                   Impression   CONCLUSION:    1. Hyperexpansion of the lungs.  2. Interstitial opacities representing mild edema.  3. Small right-sided pleural effusion with right basilar atelectasis/infiltrates.       EGD 4/9/24  FINDINGS:  ESOPHAGUS: Post surgical changes c/w partial esophagectomy, gastric pull through and esophago bronchial fistula without covering, the previously placed fully covered stent proximal end was located distal to the fistula  GASTRIC: Post surgical changes and gastric pull through, patent pyloric, post POEM scar at antrum  JEJUNUM: Old J tube scar  THERAPEUTICS: We removed the old esophageal stent by gentle traction of proximal end suture with a rat tooth forceps en block with the scope  After good transillumination and good indentation were noted the mesogastrium was prepped with Betadine, was the covered with a fenestrated sterile drape, the skin was numbed using a 22 gauge needle and a 5 cc syringe with 1% lidocaine, a total of 1cc were injected, after this an introducer trocar was advanced into the Jejunal lumen, a guidewire was advanced through the trocar and grasped through the scope with the help of a snare, the wire was removed en block with the scope, after this a percutaneous ostomy tube, [employer] 20 Fr externally removable with a soft internal bolster was introduced with the Pull method, placement was confirmed endoscopically, a small incision was perform on the skin with a scalp to allow passage of the 20 Fr  tube, at the end of the procedure the internal bolster was located at 3 cm from the abdominal wall, antibiotic ointment was placed at the external wound. The patient tolerated the procedure well, there were no immediate complication following the procedure.  We then advanced a [employer] guidewire through the scope with tip at the gastric lumen, removed the scope and leaving the guidewire in place, we then under direct visualization and fluoroscopic support advanced a [employer] fully covered 23 x 125 mm fully covered stent covering the fistula, we then sutured the proximal end of the stent to the esophagus at two points with single channel endoscopic Apollo suturing device and 2-0 prolene, dynamic contrast injection showed no leak    Assessment/Plan:     Mr. 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 He was recently admitted at [facility] for stent placement. Follow up UGI showed aspiration and concern for leak, so patient was discharged home on TPN and scheduled for outpatient J tube replacement on 4/11. However, patient had persistent cough and progressive pain so he presented to the ER on 4/8/24. He underwent EGD with esophageal stent replacement and J tube placement yesterday with Dr. Ritchie. CXR on admission showed small right effusion and pulmonology is planning on thoracentesis today. There are no acute thoracic surgical issues and patient is scheduled for bronchoesophageal fistula repair on 5/28 at [facility]. In the meantime, recommend tube feed advancement and UGI tomorrow. Patient expressed understanding and agrees with the plan.     -Thoracentesis per pulmonology today.  -J tube in place. Advance tube feeds as tolerated to goal.   -NPO until UGI complete.   -UGI tomorrow.  -Scheduled for bronchoesophageal fistula repair at [facility] with Dr. Koroma on 5/28/24.     Patient discussed with Dr. Koroma.      Yohana Jeffrey PA-C  Thoracic Surgery

## 2024-04-09 NOTE — ANESTHESIA POSTPROCEDURE EVALUATION
[facility]    Dontae Buddy Ortegacristina Champion Patient Status:  Inpatient   Age/Gender 53 year old male MRN [id number]   Location [facility] Attending Roxanne Fitch DO   Hosp Day # 1 PCP Adrian Horowitz MD       Anesthesia Post-op Note    ENTEROSCOPY with stent placement and suturing and PEJ tube placement    Procedure Summary       Date: 04/09/24 Room / Location:  ENDOSCOPY 02 /  ENDOSCOPY    Anesthesia Start: 1401 Anesthesia Stop: 1504    Procedure: ENTEROSCOPY with stent placement and suturing and PEJ tube placement Diagnosis: (dysphagia)    Surgeons: Raheel Ritchie MD Anesthesiologist: El Oquendo MD    Anesthesia Type: general ASA Status: 3            Anesthesia Type: general    Vitals Value Taken Time   /103 04/09/24 1504   Temp 98.4 °F (36.9 °C) 04/09/24 1504   Pulse 76 04/09/24 1504   Resp 16 04/09/24 1504   SpO2 98 % 04/09/24 1504       Patient Location: PACU    Anesthesia Type: general    Airway Patency: patent and extubated    Postop Pain Control: adequate    Mental Status: mildly sedated but able to meaningfully participate in the post-anesthesia evaluation    Nausea/Vomiting: none    Cardiopulmonary/Hydration status: stable euvolemic    Complications: no apparent anesthesia related complications    Postop vital signs: stable    Dental Exam: Unchanged from Preop

## 2024-04-09 NOTE — CONSULTS
[facility]                       [facility]    Dontae Buddy Ortegacristina Champion Patient Status:  Inpatient    10/15/1970 MRN [id number]   Location [facility] 4NW-A Attending Roxanne Fitch, DO   Hosp Day # 1 PCP Adrian Hroowitz MD     Reason for consultation: Bronchial esophageal fistula  HPI: This is a 53 yr-old male with an extensive PMhx that includes CAD s/p CABG, dyslipidemia, C Diff (10/2022 and 2023), and distal esophageal adenocarcinoma (Dx 2022) s/p chemo RT followed by laparoscopic robotic assisted esophagogastrectomy with J-tube placement (2022; Dr. Lu) recovery C/B anastomotic leak s/p endoscopic closure with stenting (Dr. Ritchie) with course further C/B GOO s/p EGD with esophageal stenting plus Botox injections (2022; Dr. Ritchie).  Patient underwent EUS for pancreatic head mass (2023; Dr. Ritchie) with biopsy revealing moderate to poorly differentiated carcinoma.   Pt well known to GI service for multiple admissions since 2023 for cough/recurrent PNA found to have severe ulceration at esophagogastric anastomosis with a bronchial fistula tract opening. Pt has underwent several endoscopic procedures at attempted closure with esophageal stents, bronchial stents, and more recently ASD occluder x2 (off label use). Pt was admitted to [facility] earlier this month with malpositioned ASD occluder (removed) and pt had repeat esophageal stent placed however per [facility] records pt with ongoing leak. Pt was on TPN and was to have J tube placement in the outpatient setting.   Pt continues to have dysphagia symptoms--even expectorating oral secretions. Pt also reports ongoing chest discomfort with swallowing. 10 lb wt loss in the last 2 weeks however pt with the desire to eat. Pt was started on TPN at [facility] however reports no additional bags since discharge home. No fevers, melena, hematochezia, or diarrhea.   PMHx:   Past Medical History:   Diagnosis  Date    Back problem     Belching 02/01/2022    Black stools 02/01/2022    Borderline diabetes     Dx in 8/2013 - HgA1C 6.2%    C. difficile diarrhea 10/23/2022    pt treated and without symptoms    Chest pain 02/01/2022    Coronary artery disease     On 8/16/13: CABG x 4 with LIMA to LAD and SVG to diagonal, OM and PDA    Decorative tattoo 01/01/2000    Depression     Difficult intubation     h/o esophagectomy for CA; developed esophagobronchial vistula    Disorder of liver     LIVER CA    Esophageal cancer (HCC)     completed chemo    Esophageal reflux     Essential hypertension     Exposure to medical diagnostic radiation     last tx 8/18/2022    Frequent urination 01/01/2013    Gastroparesis     Heartburn 02/01/2022    High blood pressure     High cholesterol 08/01/2013    Found when I had quadruple bypass    History of COVID-19 10/16/2022    asymptomatic - pt was dx during a hospitalization for another diagnosis. No continued symptoms    History of stomach ulcers     Hyperlipidemia     Hyperlipidemia LDL goal < 70     Indigestion 02/01/2022    Morbid obesity with BMI of 40.0-44.9, adult (HCC)     Muscle weakness     Nausea vomiting and diarrhea 03/14/2024    Nontoxic multinodular goiter     Dx in 8/2013: pt was told that imaging showed thyroid cysts per PCP    Pancreatic cancer (HCC)     last dose 12/4/2023 is scheduled for another round 12/27/23    Peripheral vascular disease (HCC)     pt denies    Personal history of antineoplastic chemotherapy     for esophageal cancer/completed    Personal history of antineoplastic chemotherapy 12/2023    pancreatic cancer    Personal history of antineoplastic chemotherapy     Last treatment 3/12/24    Problems with swallowing 02/01/2022    Pulmonary nodules     Dx in 8/2013: CT chest showed small bilateral fissural-based lung nodules less than 1 cm    S/P CABG x 4     On 8/16/13: CABG x 4 with LIMA to LAD and SVG to diagonal, OM and PDA    Shortness of breath     when  coughing; no oxygen    Vomiting 2022                PSHx:   Past Surgical History:   Procedure Laterality Date    APPENDECTOMY      APPENDECTOMY      ARTHROSCOPY OF JOINT UNLISTED      right shoulder    CABG      On 13: CABG x 4 with LIMA to LAD and SVG to diagonal, OM and PDA    CARDIAC CATH LAB      On 2013: cardiac cath showed 3-vessel disease    OTHER SURGICAL HISTORY      1.       Laparoscopic robotic-assisted esophagogastrectomy.    PORT, INDWELLING, IMP       Medications:    [COMPLETED] sodium chloride 0.9 % IV bolus 2,040 mL  30 mL/kg Intravenous Once    [COMPLETED] ondansetron (Zofran) 4 MG/2ML injection 4 mg  4 mg Intravenous Once    [] sodium chloride 0.9% infusion   Intravenous Continuous    [] ondansetron (Zofran) 4 MG/2ML injection 4 mg  4 mg Intravenous Q4H PRN    morphINE PF 2 MG/ML injection 0.5 mg  0.5 mg Intravenous Q2H PRN    Or    morphINE PF 2 MG/ML injection 1 mg  1 mg Intravenous Q2H PRN    Or    morphINE PF 2 MG/ML injection 2 mg  2 mg Intravenous Q2H PRN    diphenhydrAMINE (Benadryl) 50 mg/mL  injection 12.5 mg  12.5 mg Intravenous Q4H PRN    Or    diphenhydrAMINE (Benadryl) cap/tab 25 mg  25 mg Oral Q4H PRN    sodium chloride 0.9% infusion   Intravenous Continuous    heparin (Porcine) 100 Units/mL lock flush 500 Units  5 mL Intravenous PRN     Allergies: No Known Allergies  Social HX:   Social History     Socioeconomic History    Marital status:     Number of children: 3   Occupational History    Occupation: works as  - on workman's comp   Tobacco Use    Smoking status: Former     Packs/day: 1.00     Years: 27.00     Additional pack years: 0.00     Total pack years: 27.00     Types: Cigarettes     Quit date: 8/15/2013     Years since quitting: 10.6    Smokeless tobacco: Never    Tobacco comments:     Quit smoking 2013   Vaping Use    Vaping Use: Never used   Substance and Sexual Activity    Alcohol use: Not Currently    Drug use: Never     Sexual activity: Not Currently     Partners: Female   Other Topics Concern    Caffeine Concern Yes    Stress Concern No    Weight Concern No    Special Diet No    Exercise No    Seat Belt No   Social History Narrative    Lives with life partner    Has 3 daughters - 1 lives closeby, 1 in [place], 1 in [place]     Social Determinants of Health     Financial Resource Strain: Low Risk  (12/14/2023)    Financial Resource Strain     Difficulty of Paying Living Expenses: Not very hard     Med Affordability: No   Food Insecurity: No Food Insecurity (4/8/2024)    Food Insecurity     Food Insecurity: Never true   Transportation Needs: No Transportation Needs (4/8/2024)    Transportation Needs     Lack of Transportation: No   Housing Stability: Low Risk  (4/8/2024)    Housing Stability     Housing Instability: No     Housing Instability Emergency: No      FamHx: The patient has no family history of colon cancer or other gastrointestinal malignancies;  No family history of ulcer disease, or inflammatory bowel disease  ROS:  In addition to the pertinent positives described above:            Infectious Disease:  + recurrent PNA, C Diff (10/2022 and 12/2023)            Cardiovascular: + CAD s/p CABG, HTN, dyslipidemia             Respiratory: + recurrent pneumonia            Hematologic: The patient reports no easy bruising, frequent gum bleeding or nose bleeding;  The patient has no history of known chronic anemia            Dermatologic: The patient reports no recent rashes or chronic skin disorders            Rheumatologic: The patient reports no history of chronic arthritis, myalgias, arthralgias            Genitourinary:  The patient reports no history of recurrent urinary tract infections, hematuria, dysuria, or nephrolithiasis           Psychiatric: + depression           Oncologic: + distal esophageal adenocarcinoma            ENT: The patient reports no hoarseness of voice, hearing loss, sinus congestion, tinnitus            Neurologic: The patient reports no history of seizure, stroke, or frequent headaches  PE: /75 (BP Location: Left arm)   Pulse 64   Temp 98.4 °F (36.9 °C) (Oral)   Resp 18   Ht 6' 2\" (1.88 m)   Wt 150 lb 14.4 oz (68.4 kg)   SpO2 91%   BMI 19.37 kg/m²   Gen: AAO x 3, able to speak in complete sentences; + temporal muscle wasting   HENT: EOMI, PERRL, oropharynx is clear with moist mucosal membranes  Eyes: Sclerae are anicteric  Neck:  Supple without nuchal rigidity  CV: Regular rate and rhythm, with normal S1 and S2  Resp: Diminished bilaterally R>L; frequent productive cough (thin, white sputum)  Abdomen: Soft, non-tender, non-distended with the presence of bowel sounds; No hepatosplenomegaly; no rebound or guarding; No ascites is clinically apparent; no tympany to percussion  Ext: No peripheral edema or cyanosis  Skin: Warm and dry  Psychiatric: Appropriate mood and congruent affect without obvious depression or anxiety  Labs:   Lab Results   Component Value Date    WBC 5.6 04/09/2024    HGB 9.6 04/09/2024    HCT 31.6 04/09/2024    .0 04/09/2024    CREATSERUM 0.58 04/09/2024    BUN 14 04/09/2024     04/09/2024    K 4.1 04/09/2024     04/09/2024    CO2 29.0 04/09/2024    GLU 95 04/09/2024    CA 8.9 04/09/2024    ALB 2.5 04/09/2024    ALKPHO 70 04/09/2024    BILT 0.6 04/09/2024    AST 12 04/09/2024    ALT 12 04/09/2024     Recent Labs   Lab 04/08/24  1038 04/09/24  0642   * 95   BUN 23 14   CREATSERUM 0.71 0.58*   CA 9.5 8.9    139   K 4.4 4.1    107   CO2 30.0 29.0     Recent Labs   Lab 04/09/24  0642   RBC 3.37*   HGB 9.6*   HCT 31.6*   MCV 93.8   MCH 28.5   MCHC 30.4*   RDW 16.0   NEPRELIM 4.00   WBC 5.6   .0       Recent Labs   Lab 04/08/24  1038 04/09/24  0642   ALT 16 12*   AST 14* 12*       Imaging:   PROCEDURE:  XR CHEST AP PORTABLE  (CPT=71045)     TECHNIQUE:  AP chest radiograph was obtained.     COMPARISON:  [facility] , XR, XR CHEST AP PORTABLE   (CPT=71045), 4/08/2024, 10:57 AM.  [facility] , XR, XR CHEST AP PORTABLE  (CPT=71045), 3/14/2024, 6:01 PM.     INDICATIONS:  dyspnea     PATIENT STATED HISTORY: (As transcribed by Technologist)   dyspnea      FINDINGS:  Cardiac size is stable.  There is an esophageal stent noted.  Stable power port with tip overlying the right atrial SVC junction.  Right-sided PICC is stable positioning.  Small right-sided pleural effusion right basilar  atelectasis/infiltrates.  Mild interstitial opacities.  No pneumothorax.      Impression   CONCLUSION:    1. Hyperexpansion of the lungs.  2. Interstitial opacities representing mild edema.  3. Small right-sided pleural effusion with right basilar atelectasis/infiltrates.       LOCATION:  [facility]      Dictated by (CST): Jenny Navarrete MD on 4/09/2024 at 7:36 AM      Finalized by (CST): Jenny Navarrete MD on 4/09/2024 at 7:37 AM      Impression: 53 yr-old male with an extensive PMhx that includes CAD s/p CABG, dyslipidemia, C Diff (10/2022 and 12/2023), and distal esophageal adenocarcinoma (Dx 6/2022) s/p chemo RT followed by laparoscopic robotic assisted esophagogastrectomy with J-tube placement (9/2022; Dr. Lu) recovery C/B anastomotic leak s/p endoscopic closure with stenting (Dr. Ritchie) with course further C/B GOO s/p EGD with esophageal stenting plus Botox injections (11/2022; Dr. Ritchie).  Patient underwent EUS for pancreatic head mass (4/2023; Dr. Ritchie) with biopsy revealing moderate to poorly differentiated carcinoma and has struggled with recurrent PNA from bronchial esophageal fistula which has failed to resolve despite multiple endoscopic attempts at closure.   Pt readmitted with in ability to tolerate PO intake and malnutrition. Case discussed with interventional GI (Dr Ritchie) and will plan for repeat esophageal stent placement and insertion of a J-tube for feedings.     The risks, benefits, alternatives of the procedure including the risks of anesthesia,  bleeding, perforation, missed lesions, need for surgery, infection, intestinal obstruction, peritonitis, and the requirement that the tube remain in place for a minimum of 30 days were discussed with the patient. He expressed understanding of the risks and was agreeable to proceed.    Recommendations:     Enteroscopy with placement of an esophageal stent and jejunal tube placement under general anesthesia with Dr Ritchie  Strict NPO  Ancef 2 gm IV on-call to #1  Pain management per Hospitalist recommendations; antiemetics as needed  Dietary consult for tube feed recommendations   CBC in AM    Thank you for the consultation, we will follow the patient with you.  Attending addendum (Dr Anderson) to follow later today and provide formal, final recommendations at that time   ARTEMIO Hood  9:18 AM  4/9/2024  [facility]  [phone]    Attending physician addendum:   I personally saw and examined the patient, agree with the above findings, assessment and plan. The patient notes persistent regurgitation and coughing. He denies melena hematochezia. His abdomen is soft, NT/ND, +BS. Plan is for enteroscopy with esophageal stent and J tube by Dr. Ritchie.     The potentially life-threatening complications of infection, sedation, bleeding, and perforation were reviewed along with the possible need for surgical management, transfusions, or repeat endoscopy should one of these complications arise. He understands and is agreeable.    Mayelin Anderson DO  3:24 PM  4/9/2024  [facility]  [phone]

## 2024-04-09 NOTE — DIETARY MALNUTRITION NOTE
[facility]   part of [facility]    NUTRITION ASSESSMENT    Pt meets moderate malnutrition criteria at this time.    CRITERIA FOR MALNUTRITION DIAGNOSIS:  Criteria for non-severe malnutrition diagnosis: acute illness/injury related to wt loss 5% in 1 month, energy intake less than 75% for greater than 7 days, body fat mild depletion, and muscle mass mild depletion      NUTRITION INTERVENTION:    Meal and Snacks - Monitor and encourage adequate PO intake.   Medical Food Supplements - [employer] 1.0 TID. Rationale/use for oral supplements discussed.  Enteral Nutrition - Via Jtube  Recommend starting [employer] 1.4 at 10 mL/hr advancing 10 mL/hr q 6 hours to goal rate of 65 mL/hr.   This will provide 2184 kcal, 97 grams protein, 1123 mL total free water, and 100% of RDI's.   Recommend 150 mL water flush q 4 hours, TF+FWF provides 2923 mL total fluids.         PATIENT STATUS: 04/09/24 MST 2 / consult for TF  53 year old male admitted with intractable vomiting, SOB, chest discomfort, diagnosed with aspiration pneumonitis. Pt with h/o metastatic esophageal adenocarcinoma with mets to liver, bowel, and pancreatic head s/p esophagogastrectomy with gastric pull through and Jejunostomy feeding tube 2 years ago. He has had multiple complications: esophageal leak, bronchial fistual, esophageal stent. Pt reports is okay to eat solids, full liquids just not thin liquids such as water or juice.  Pt reports eating well prior to admit.   Pt currently NPO for procedure, plan to place Jtube, stent.    Pt with moderate muscle and fat depletion . Noted wt loss of ~20 pounds over past 2-3 weeks, some likely fluid depletion from vomiting pta.     ANTHROPOMETRICS:  Ht: 188 cm (6' 2\")  Wt: 68.4 kg (150 lb 14.4 oz).   BMI: Body mass index is 19.37 kg/m².  IBW: 86.4 kg      WEIGHT HISTORY:   Weight loss: Yes, Severe Wt loss of 20 lbs, 12%, over 3  weeks     Wt Readings from Last 10 Encounters:   04/08/24 68.4 kg (150 lb 14.4 oz)    03/25/24 78.7 kg (173 lb 8 oz)   03/20/24 77.1 kg (170 lb)   03/14/24 77.1 kg (170 lb)   03/12/24 82.6 kg (182 lb)   03/07/24 82.4 kg (181 lb 10.5 oz)   02/22/24 79.6 kg (175 lb 8 oz)   02/20/24 80.3 kg (177 lb)   02/19/24 80.5 kg (177 lb 8 oz)   02/14/24 80.3 kg (177 lb)        NUTRITION:  Diet:       Procedures    NPO      Food Allergies: No  Cultural/Ethnic/Congregational Preferences Addressed: Yes    Percent Meals Eaten (last 3 days)       None            GI system review: swallowing dysfunction and emesis Last BM: PTA  Skin and wounds: none    NUTRITION RELATED PHYSICAL FINDINGS:     1. Body Fat/Muscle Mass: moderate depletion body fat Orbital fat pad and Buccal fat pad and moderate muscle depletion Temple region and Clavicle region     2. Fluid Accumulation: none     NUTRITION PRESCRIPTION: 86.4kg  Calories: 7166-8432 calories/day (25-30 kcal/kg)  Protein:  grams protein/day (1.0-1.5 grams protein per kg)  Fluid: ~1 ml/kcal or per MD discretion    NUTRITION DIAGNOSIS/PROBLEM:  Malnutrition related to physiological causes and increased nutritional demands for healing as evidenced by documented/reported unintentional weight loss, loss of fat mass, and loss of muscle mass      MONITOR AND EVALUATE/NUTRITION GOALS:  PO intake of 75% of meals TID - New  PO intake of 75% of oral nutrition supplement/s - New  Weight stable within 1 to 2 lbs during admission - New  Tolerate alternative nutrition at 100% of goal - New      MEDICATIONS:  Reviewed    LABS:  Reviewed    Pt is at High nutrition risk    Juhi Valentino RD,LDN  Clinical Dietitian  [id number]

## 2024-04-09 NOTE — PROGRESS NOTES
Continues t have chest discomfort, iv morphine given. Ice chips only . Iv fluids and ice chips given. Port intact.

## 2024-04-09 NOTE — PROGRESS NOTES
[facility]   part of [facility]     Hospitalist Progress Note     Dontae Buddy Cortez . Patient Status:  Inpatient    10/15/1970 MRN [id number]   Location [facility] 4NW-A Attending Roxanne Fitch, DO   Hosp Day # 1 PCP Adrian Horowitz MD     Chief Complaint: chest pain, n/v    Subjective:     Patient feeling a little better, denies any abdominal pain. Awaiting procedure with GI later today     Objective:    Review of Systems:   A comprehensive review of systems was completed; pertinent positive and negatives stated in subjective.    Vital signs:  Temp:  [97.6 °F (36.4 °C)-98.7 °F (37.1 °C)] 98 °F (36.7 °C)  Pulse:  [62-89] 80  Resp:  [10-18] 18  BP: (128-210)/() 128/77  SpO2:  [90 %-100 %] 100 %    Physical Exam:    General: No acute distress  Respiratory: No wheezes, no rhonchi  Cardiovascular: S1, S2, regular rate and rhythm  Abdomen: Soft, Non-tender, non-distended, positive bowel sounds  Neuro: No new focal deficits.   Extremities: No edema      Diagnostic Data:    Labs:  Recent Labs   Lab 24  1038 24  0642   WBC 9.0 5.6   HGB 11.6* 9.6*   MCV 91.8 93.8   .0 166.0       Recent Labs   Lab 24  1038 24  0642   * 95   BUN 23 14   CREATSERUM 0.71 0.58*   CA 9.5 8.9   ALB 3.0* 2.5*    139   K 4.4 4.1    107   CO2 30.0 29.0   ALKPHO 83 70   AST 14* 12*   ALT 16 12*   BILT 0.5 0.6   TP 7.6 6.4       Estimated Creatinine Clearance: 140.9 mL/min (A) (based on SCr of 0.58 mg/dL (L)).    Recent Labs   Lab 24  1038   TROPHS 11       No results for input(s): \"PTP\", \"INR\" in the last 168 hours.               Microbiology    Hospital Encounter on 24   1. Blood Culture     Status: None (Preliminary result)    Collection Time: 24 11:29 AM    Specimen: Blood,peripheral   Result Value Ref Range    Blood Culture Result No Growth 1 Day N/A         Imaging: Reviewed in Epic.    Medications:    gabapentin  300 mg Oral BID    losartan  50  mg Oral Daily    metoprolol succinate ER  50 mg Oral Daily    morphINE ER  15 mg Oral Q12H    OLANZapine  5 mg Oral Nightly    [START ON 4/10/2024] pantoprazole  40 mg Oral QAM AC    lipase-protease-amylase (Lip-Prot-Amyl)  25,000 Units Oral TID CC    sertraline  200 mg Oral Daily       Assessment & Plan:      #Recurrent bronchial-esophageal fistula  #Stent malposition  - NPO, IVF, pain control, anti-emetics PRN  - GI consulted > plan for enteroscopy, stent evaluation, J-tube placement later today    #Metastatic esophageal cancer  - hx of esophagectomy, gastric pull through, esophago-bronchial fistula with multiple stents  - PTA pancreatic enzymes when tolerating PO  - PTA pain medications    #CAD w/ hx of CABG  #HTN  - losartan, metoprolol when tolerating PO    #Anxiety/depression  - resume olanzapine, sertraline when tolerating PO    #Chronic anemia  - Hgb lower than baseline  - iron studies ordered          Roxanne Fitch DO    Supplementary Documentation:     Quality:  DVT Mechanical Prophylaxis:        DVT Pharmacologic Prophylaxis   Medication    heparin (Porcine) 100 Units/mL lock flush 500 Units                Code Status: Full Code  Neumann: No urinary catheter in place  Neumann Duration (in days):   Central line:    SHUBHAM:     Discharge is dependent on: clinical state, GI recs  At this point Mr. Cortez is expected to be discharge to: home    The 21st Century Cures Act makes medical notes like these available to patients in the interest of transparency. Please be advised this is a medical document. Medical documents are intended to carry relevant information, facts as evident, and the clinical opinion of the practitioner. The medical note is intended as peer to peer communication and may appear blunt or direct. It is written in medical language and may contain abbreviations or verbiage that are unfamiliar.

## 2024-04-09 NOTE — PROGRESS NOTES
[facility]  Progress Note     SUBJECTIVE/Interval history:  No acute events overnight, just returned from EGD with J tube placement and esophageal stent placement. He denies acute concerns at present.   No dyspnea. Still with cough/phlegm, no blood. No fevers. Still with chest and abd pain    Review of Systems:   A comprehensive 14 point review of systems was completed.   Pertinent positives and negatives noted in the HPI.    OBJECTIVE:  Vitals:    04/08/24 1342 04/08/24 1343 04/08/24 2100 04/09/24 0459   BP: (!) 165/93 (!) 166/91 154/73 135/75   BP Location: Right arm Left arm Left arm Left arm   Pulse: 84 65 69 64   Resp: 18 19 18 18   Temp: 97.3 °F (36.3 °C) 97.3 °F (36.3 °C) 98.7 °F (37.1 °C) 98.4 °F (36.9 °C)   TempSrc: Oral Oral Oral Oral   SpO2: 93% 98% 90% 91%   Weight: 150 lb 14.4 oz (68.4 kg)      Height:           Vital signs in last 24 hours:  Blood pressure 135/75, pulse 64, temperature 98.4 °F (36.9 °C), temperature source Oral, resp. rate 18, height 6' 2\" (1.88 m), weight 150 lb 14.4 oz (68.4 kg), SpO2 91%.     Intake/Output:    Intake/Output Summary (Last 24 hours) at 4/9/2024 1203  Last data filed at 4/8/2024 1300  Gross per 24 hour   Intake 1000 ml   Output --   Net 1000 ml           Physical Exam:  General: Appears alert, comfortable. No acute distress  Neurologic:No focal deficits.  Alert.  Oriented.  Lungs:CTAB anteriorly, slightly diminished at right base. No wheeze. No distress  Heart:RRR no m  Abdomen:Soft, non-tender.  No masses.  No guarding.  No rebound.  Extremities:no edema    Lab Data Review:   Recent Labs   Lab 04/08/24  1038 04/09/24  0642   * 95   BUN 23 14   CREATSERUM 0.71 0.58*   CA 9.5 8.9    139   K 4.4 4.1    107   CO2 30.0 29.0     Recent Labs   Lab 04/08/24  1038 04/09/24  0642   RBC 4.02* 3.37*   HGB 11.6* 9.6*   HCT 36.9* 31.6*   MCV 91.8 93.8   MCH 28.9 28.5   MCHC 31.4 30.4*   RDW 16.0 16.0   NEPRELIM 7.03 4.00   WBC 9.0 5.6    .0 166.0     No results for input(s): \"BNP\" in the last 168 hours.  No results for input(s): \"TROP\", \"CK\" in the last 168 hours.  No results for input(s): \"PT\", \"INR\", \"PTT\" in the last 168 hours.  No results for input(s): \"ABGPHT\", \"VSLCYB2F\", \"VROJM7V\", \"ABGHCO3\", \"ABGBE\", \"TEMP\", \"TACOS\", \"SITE\", \"DEV\", \"THGB\" in the last 168 hours.    Invalid input(s): \"DSK87CDK\", \"CHOB\"    Other Labs:  Interval Culture Data:   No results found for this visit on 04/08/24.  No results for input(s): \"COLORUR\", \"CLARITY\", \"SPECGRAVITY\", \"GLUUR\", \"BILUR\", \"KETUR\", \"BLOODURINE\", \"PHURINE\", \"PROUR\", \"UROBILINOGEN\", \"NITRITE\", \"LEUUR\", \"WBCUR\", \"RBCUR\", \"BACUR\", \"EPIUR\" in the last 168 hours.    Interval Radiology:   XR CHEST AP PORTABLE  (CPT=71045)    Result Date: 4/9/2024  CONCLUSION:  1. Hyperexpansion of the lungs. 2. Interstitial opacities representing mild edema. 3. Small right-sided pleural effusion with right basilar atelectasis/infiltrates.    LOCATION:  [facility]      Dictated by (CST): Jenny Navarrete MD on 4/09/2024 at 7:36 AM     Finalized by (CST): Jenny Navarrete MD on 4/09/2024 at 7:37 AM       XR CHEST AP PORTABLE  (CPT=71045)    Result Date: 4/8/2024  CONCLUSION:  1. When compared to 3/14/2024 chest radiograph, placement of a stent.  Please see details as above.   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  Malnutrition/hypoalbuminemia, s/p J tube placement    Plan:  Monitor respiratory status closely, goal sats >89%  Would plan on diagnostic/therapeutic thoracentesis tomorrow. Will plan for thoracentesis with radiology  Will need to review with thoracic surgery and GI regarding future plans for flap vs further interventional procedures    Chapo Madrid MD

## 2024-04-09 NOTE — ANESTHESIA PROCEDURE NOTES
Airway  Date/Time: 4/9/2024 2:06 PM  Urgency: elective    Airway not difficult    General Information and Staff    Patient location during procedure: OR  Anesthesiologist: El Oquendo MD  Performed: anesthesiologist   Performed by: El Oquendo MD  Authorized by: El Oquendo MD      Indications and Patient Condition  Indications for airway management: anesthesia  Sedation level: deep  Preoxygenated: yes  Patient position: sniffing  Mask difficulty assessment: 1 - vent by mask    Final Airway Details  Final airway type: endotracheal airway      Successful airway: ETT  Cuffed: yes   Successful intubation technique: direct laryngoscopy  Endotracheal tube insertion site: oral  Blade: Sowmya  Blade size: #3  ETT size (mm): 7.5    Cormack-Lehane Classification: grade IIA - partial view of glottis  Placement verified by: capnometry   Measured from: lips  ETT to lips (cm): 20  Number of attempts at approach: 1

## 2024-04-09 NOTE — OPERATIVE REPORT
Dontae Cortez Jr. Patient Status:  Inpatient    10/15/1970 MRN [id number]   [facility] Attending Roxanne Fitch, DO   Date 2024 PCP Adrian Horowitz MD     PREOPERATIVE DIAGNOSIS/INDICATION: Esophago-bronchial fistula, post esophagectomy, recent esophageal stent at [place], needs J tube for enteral nutrition  POSTOPERTATIVE DIAGNOSIS: Same, stent malposition, persistent fistula  PROCEDURE PERFORMED: PUSH enteroscopy with Jejunal tube placement, EGD with esophageal stent placement and endoscopic suturing, EGd foreign body retrieval  TIME OUT WAS PERFORMED    SEDATION: MAC sedation provided by General Anesthesia    INFORMED CONSENT: Risks, benefits and alternatives to the procedure were explained to the patient including but not limited to bleeding, infection, perforation, adverse drug reactions, pancreatitis and the need for hospitalization and surgery if this occurs, the patient understands and agrees to procedure.  PROCEDURE DESCRIPTION: A enteroscope was introduced into the patient’s mouth, hypo pharynx, esophagus pass the esophagogastric anastomosis into the stomach, duodenum, pass the ligament of Treitz into the Jejunum, retroflexion of the endoscope was performed in the stomach, after this a regular and single channel upper endoscope were introduced to the esophagus and stomach. Careful examination of the above described areas was performed on withdrawal of the endoscope. The patient tolerated the procedure well, there were no immediate complication immediately following the procedure, and the patient was transferred to recovery in stable condition.  FINDINGS:  ESOPHAGUS: Post surgical changes c/w partial esophagectomy, gastric pull through and esophago bronchial fistula without covering, the previously placed fully covered stent proximal end was located distal to the fistula  GASTRIC: Post surgical changes and gastric pull through, patent pyloric, post POEM  scar at antrum  JEJUNUM: Old J tube scar  THERAPEUTICS: We removed the old esophageal stent by gentle traction of proximal end suture with a rat tooth forceps en block with the scope  After good transillumination and good indentation were noted the mesogastrium was prepped with Betadine, was the covered with a fenestrated sterile drape, the skin was numbed using a 22 gauge needle and a 5 cc syringe with 1% lidocaine, a total of 1cc were injected, after this an introducer trocar was advanced into the Jejunal lumen, a guidewire was advanced through the trocar and grasped through the scope with the help of a snare, the wire was removed en block with the scope, after this a percutaneous ostomy tube, [employer] 20 Fr externally removable with a soft internal bolster was introduced with the Pull method, placement was confirmed endoscopically, a small incision was perform on the skin with a scalp to allow passage of the 20 Fr tube, at the end of the procedure the internal bolster was located at 3 cm from the abdominal wall, antibiotic ointment was placed at the external wound. The patient tolerated the procedure well, there were no immediate complication following the procedure.  We then advanced a Savary guidewire through the scope with tip at the gastric lumen, removed the scope and leaving the guidewire in place, we then under direct visualization and fluoroscopic support advanced a [employer] fully covered 23 x 125 mm fully covered stent covering the fistula, we then sutured the proximal end of the stent to the esophagus at two points with single channel endoscopic Apollo suturing device and 2-0 prolene, dynamic contrast injection showed no leak    RECOMMENDATIONS:   Post EGD precautions, watch for bleeding, infection, perforation, adverse drug reactions   Ok trial of clears 3 hrs and advance to post stent diet over the next 72 hrs  Ok to use Jejunal tube in 3 hrs  Nutritionist consult for Jejunal tube  feeding recommendation    Raheel Ritchie MD  4/9/2024  2:55 PM

## 2024-04-10 ENCOUNTER — APPOINTMENT (OUTPATIENT)
Dept: ULTRASOUND IMAGING | Facility: HOSPITAL | Age: 54
End: 2024-04-10
Attending: INTERNAL MEDICINE
Payer: COMMERCIAL

## 2024-04-10 LAB
BASOPHILS # BLD AUTO: 0.02 X10(3) UL (ref 0–0.2)
BASOPHILS NFR BLD AUTO: 0.5 %
EOSINOPHIL # BLD AUTO: 0.03 X10(3) UL (ref 0–0.7)
EOSINOPHIL NFR BLD AUTO: 0.7 %
ERYTHROCYTE [DISTWIDTH] IN BLOOD BY AUTOMATED COUNT: 15.8 %
HCT VFR BLD AUTO: 29.8 %
HGB BLD-MCNC: 9.9 G/DL
IMM GRANULOCYTES # BLD AUTO: 0.03 X10(3) UL (ref 0–1)
IMM GRANULOCYTES NFR BLD: 0.7 %
INR BLD: 1.31 (ref 0.8–1.2)
LYMPHOCYTES # BLD AUTO: 0.61 X10(3) UL (ref 1–4)
LYMPHOCYTES NFR BLD AUTO: 15 %
MCH RBC QN AUTO: 29.7 PG (ref 26–34)
MCHC RBC AUTO-ENTMCNC: 33.2 G/DL (ref 31–37)
MCV RBC AUTO: 89.5 FL
MONOCYTES # BLD AUTO: 0.68 X10(3) UL (ref 0.1–1)
MONOCYTES NFR BLD AUTO: 16.7 %
NEUTROPHILS # BLD AUTO: 2.7 X10 (3) UL (ref 1.5–7.7)
NEUTROPHILS # BLD AUTO: 2.7 X10(3) UL (ref 1.5–7.7)
NEUTROPHILS NFR BLD AUTO: 66.4 %
PLATELET # BLD AUTO: 184 10(3)UL (ref 150–450)
PROTHROMBIN TIME: 16.3 SECONDS (ref 11.6–14.8)
RBC # BLD AUTO: 3.33 X10(6)UL
WBC # BLD AUTO: 4.1 X10(3) UL (ref 4–11)

## 2024-04-10 PROCEDURE — 99232 SBSQ HOSP IP/OBS MODERATE 35: CPT | Performed by: INTERNAL MEDICINE

## 2024-04-10 PROCEDURE — 76604 US EXAM CHEST: CPT | Performed by: INTERNAL MEDICINE

## 2024-04-10 RX ORDER — ONDANSETRON 2 MG/ML
4 INJECTION INTRAMUSCULAR; INTRAVENOUS EVERY 6 HOURS PRN
Status: DISCONTINUED | OUTPATIENT
Start: 2024-04-10 | End: 2024-04-11

## 2024-04-10 RX ORDER — PROCHLORPERAZINE EDISYLATE 5 MG/ML
10 INJECTION INTRAMUSCULAR; INTRAVENOUS EVERY 6 HOURS PRN
Status: DISCONTINUED | OUTPATIENT
Start: 2024-04-10 | End: 2024-04-11

## 2024-04-10 NOTE — PLAN OF CARE
Pt A/O x4. VSS. Afebrile. Pt c/o pain throughout day. PRN morphine admin per MAR. Pt reports some relief w/ pain medication. Medications admin per MAR. Pt ambulated safely in room. TF per order/GI. Fall and safety precautions in place. Call light within reach.

## 2024-04-10 NOTE — PLAN OF CARE
A&Ox4, VSS and afebrile  J tube dressing C/D/I  Kimmy [employer] Standard 1.4 Continuous Tube Feeding started at 10 mL/hr per orders Ok for clear diet, do not advance diet past clears until seen by GI. NPO starting 4/10 @ 0500: Hold tube feeds at 5am for thoracentesis with radiology on 4/10   Scheduled morphine ER Q12hrs for pain      Problem: PAIN - ADULT  Goal: Verbalizes/displays adequate comfort level or patient's stated pain goal  Description: INTERVENTIONS:  - Encourage pt to monitor pain and request assistance  - Assess pain using appropriate pain scale  - Administer analgesics based on type and severity of pain and evaluate response  - Implement non-pharmacological measures as appropriate and evaluate response  - Consider cultural and social influences on pain and pain management  - Manage/alleviate anxiety  - Utilize distraction and/or relaxation techniques  - Monitor for opioid side effects  - Notify MD/LIP if interventions unsuccessful or patient reports new pain  - Anticipate increased pain with activity and pre-medicate as appropriate  4/9/2024 2338 by Lubna Smith, RN  Outcome: Progressing  4/9/2024 2338 by Lubna Smith, RN  Outcome: Progressing

## 2024-04-10 NOTE — PROGRESS NOTES
[facility]  Progress Note     SUBJECTIVE/Interval history:  No acute events overnight, he denies acute issues today. Had US chest this morning but no effusion so no procedure performed.  No dyspnea. Feels cough is more so nasal congestion/drainage.   No fevers  Remains on RA    Review of Systems:   A comprehensive 14 point review of systems was completed.   Pertinent positives and negatives noted in the HPI.    OBJECTIVE:  Vitals:    04/09/24 1619 04/09/24 2150 04/10/24 0530 04/10/24 1236   BP: 128/77 142/83 (!) 164/84 158/82   BP Location: Left arm Left arm Left arm Left arm   Pulse: 80 80 64 60   Resp: 18 16 18 19   Temp: 98 °F (36.7 °C) 98.3 °F (36.8 °C) 97.9 °F (36.6 °C) 98 °F (36.7 °C)   TempSrc: Oral Oral Oral Oral   SpO2: 100% 92% 93% 96%   Weight:       Height:           Vital signs in last 24 hours:  Blood pressure 158/82, pulse 60, temperature 98 °F (36.7 °C), temperature source Oral, resp. rate 19, height 6' 2\" (1.88 m), weight 150 lb 14.4 oz (68.4 kg), SpO2 96%.     Intake/Output:    Intake/Output Summary (Last 24 hours) at 4/10/2024 1550  Last data filed at 4/10/2024 1236  Gross per 24 hour   Intake 1800 ml   Output 600 ml   Net 1200 ml           Physical Exam:  General: Appears alert, comfortable. No acute distress  Neurologic:No focal deficits.  Alert.  Oriented.  Lungs:CTAB anteriorly, slightly diminished at right base. No wheeze. No distress  Heart:RRR no m  Abdomen:Soft, non-tender.  No masses.  No guarding.  No rebound.  Extremities:no edema    Lab Data Review:   Recent Labs   Lab 04/08/24  1038 04/09/24  0642   * 95   BUN 23 14   CREATSERUM 0.71 0.58*   CA 9.5 8.9    139   K 4.4 4.1    107   CO2 30.0 29.0     Recent Labs   Lab 04/08/24  1038 04/09/24  0642 04/10/24  0559   RBC 4.02* 3.37* 3.33*   HGB 11.6* 9.6* 9.9*   HCT 36.9* 31.6* 29.8*   MCV 91.8 93.8 89.5   MCH 28.9 28.5 29.7   MCHC 31.4 30.4* 33.2   RDW 16.0 16.0 15.8   NEPRELIM 7.03 4.00 2.70    WBC 9.0 5.6 4.1   .0 166.0 184.0     No results for input(s): \"BNP\" in the last 168 hours.  No results for input(s): \"TROP\", \"CK\" in the last 168 hours.  Recent Labs   Lab 04/10/24  0838   INR 1.31*     No results for input(s): \"ABGPHT\", \"HWDOIV3N\", \"UKMPK6G\", \"ABGHCO3\", \"ABGBE\", \"TEMP\", \"TACOS\", \"SITE\", \"DEV\", \"THGB\" in the last 168 hours.    Invalid input(s): \"YGA02PAY\", \"CHOB\"    Other Labs:  Interval Culture Data:   Hospital Encounter on 04/08/24   1. Blood Culture     Status: None (Preliminary result)    Collection Time: 04/08/24 11:29 AM    Specimen: Blood,peripheral   Result Value Ref Range    Blood Culture Result No Growth 1 Day N/A     No results for input(s): \"COLORUR\", \"CLARITY\", \"SPECGRAVITY\", \"GLUUR\", \"BILUR\", \"KETUR\", \"BLOODURINE\", \"PHURINE\", \"PROUR\", \"UROBILINOGEN\", \"NITRITE\", \"LEUUR\", \"WBCUR\", \"RBCUR\", \"BACUR\", \"EPIUR\" in the last 168 hours.    Interval Radiology:   US CHEST (CPT=76604)    Result Date: 4/10/2024  CONCLUSION:  No significant right pleural fluid noted.  No thoracentesis was performed.   LOCATION:  [facility]   Dictated by (CST): Jenny Navarrete MD on 4/10/2024 at 2:13 PM     Finalized by (CST): Jenny Navarrete MD on 4/10/2024 at 2:14 PM       XR GUIDE, GI DILATION (DRT=04128)    Result Date: 4/9/2024  CONCLUSION:  4 fluoroscopic image(s) obtained and submitted during EGD procedure.  No radiologist was present for the exam.  Correlation with real-time fluoroscopy and operative report are recommended.   LOCATION:  [facility]   Dictated by (CST): Shar Simms MD on 4/09/2024 at 3:49 PM     Finalized by (CST): Shar Simms MD on 4/09/2024 at 3:49 PM       XR CHEST AP PORTABLE  (CPT=71045)    Result Date: 4/9/2024  CONCLUSION:  1. Hyperexpansion of the lungs. 2. Interstitial opacities representing mild edema. 3. Small right-sided pleural effusion with right basilar atelectasis/infiltrates.    LOCATION:  [facility]      Dictated by (CST): Jenny Navarrete MD on 4/09/2024 at 7:36 AM      Finalized by (CST): Jenny Navarrete MD on 4/09/2024 at 7:37 AM       XR CHEST AP PORTABLE  (CPT=71045)    Result Date: 4/8/2024  CONCLUSION:  1. When compared to 3/14/2024 chest radiograph, placement of a stent.  Please see details as above.   LOCATION:  [facility]      Dictated by (CST): Isidra Briones MD on 4/08/2024 at 11:44 AM     Finalized by (CST): Isidra Briones MD on 4/08/2024 at 11:48 AM         iron sucrose  200 mg Intravenous Daily    gabapentin  300 mg Oral BID    losartan  50 mg Oral Daily    metoprolol succinate ER  50 mg Oral Daily    morphINE ER  15 mg Oral Q12H    OLANZapine  5 mg Oral Nightly    pantoprazole  40 mg Oral QAM AC    lipase-protease-amylase (Lip-Prot-Amyl)  25,000 Units Oral TID CC    sertraline  200 mg Oral Daily       ondansetron    prochlorperazine    HYDROcodone-acetaminophen    morphINE **OR** morphINE **OR** morphINE    diphenhydrAMINE **OR** diphenhydrAMINE    heparin      Assessment:  Recurrent cough, aspiration  Dyspnea related to above  Right pleural effusion - new effusion as compared to previous imaging, may be reactive to recurrent aspiration vs parapneumonic or empyema less likely; no effusion on chest ultrasound 4/10  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  Malnutrition/hypoalbuminemia, s/p J tube placement    Plan:  Monitor respiratory status closely, goal sats >89%  No effusion on chest ultrasound, no thoracentesis performed  Reviewed with thoracic surgery - planning flap surgery for BEF this month or next  No objection to discharge home from pulmonary standpoint.  We will follow peripherally. Please call with questions/concerns    Chapo Madrid MD

## 2024-04-10 NOTE — CM/SW NOTE
SW met with patient at bedside to discuss Dc planning. Patient is current with [facility] which he would like continued. SW reserved them in [place] and updated them on patient's need for home TF's.     SW also discussed that [facility] is able to accept patient. He reported that he has used them in the past and would like to use them again. SW reserved them in [facility] and updated them that patient is current with [facility].    SW will continue to follow for final dietary orders. Patient is aware that TF will be delivered on day of DC and HH will come out the next day for home teaching.     SW will continue to follow for plan of care changes and remain available for any additional DC needs or concerns.     Akanksha Helms MSW, LSW  Discharge Planner   [id number]

## 2024-04-10 NOTE — PROGRESS NOTES
[facility]   part of [facility]     Hospitalist Progress Note     Dontae Buddy Ortegacristina Arechiga. Patient Status:  Inpatient    10/15/1970 MRN [id number]   Location [facility] 4NW-A Attending Roxanne Fitch, DO   Hosp Day # 2 PCP Adrian Horowitz MD     Chief Complaint: chest pain, n/v    Subjective:     Feeling much better this morning, coughing/vomiting has improved. Awaiting thoracentesis later today. Hoping to go home tomorrow    Objective:    Review of Systems:   A comprehensive review of systems was completed; pertinent positive and negatives stated in subjective.    Vital signs:  Temp:  [97.9 °F (36.6 °C)-98.4 °F (36.9 °C)] 98 °F (36.7 °C)  Pulse:  [60-89] 60  Resp:  [10-19] 19  BP: (128-210)/() 158/82  SpO2:  [90 %-100 %] 96 %    Physical Exam:    General: No acute distress  Respiratory: No wheezes, no rhonchi  Cardiovascular: S1, S2, regular rate and rhythm  Abdomen: Soft, Non-tender, non-distended, positive bowel sounds. J-tube in place  Neuro: No new focal deficits.   Extremities: No edema      Diagnostic Data:    Labs:  Recent Labs   Lab 24  1038 24  0642 04/10/24  0559 04/10/24  0838   WBC 9.0 5.6 4.1  --    HGB 11.6* 9.6* 9.9*  --    MCV 91.8 93.8 89.5  --    .0 166.0 184.0  --    INR  --   --   --  1.31*       Recent Labs   Lab 24  1038 24  0642   * 95   BUN 23 14   CREATSERUM 0.71 0.58*   CA 9.5 8.9   ALB 3.0* 2.5*    139   K 4.4 4.1    107   CO2 30.0 29.0   ALKPHO 83 70   AST 14* 12*   ALT 16 12*   BILT 0.5 0.6   TP 7.6 6.4       Estimated Creatinine Clearance: 140.9 mL/min (A) (based on SCr of 0.58 mg/dL (L)).    Recent Labs   Lab 24  1038   TROPHS 11       Recent Labs   Lab 04/10/24  0838   PTP 16.3*   INR 1.31*        Microbiology    Hospital Encounter on 24   1. Blood Culture     Status: None (Preliminary result)    Collection Time: 24 11:29 AM    Specimen: Blood,peripheral   Result Value Ref Range    Blood  Culture Result No Growth 1 Day N/A         Imaging: Reviewed in Epic.    Medications:    iron sucrose  200 mg Intravenous Daily    gabapentin  300 mg Oral BID    losartan  50 mg Oral Daily    metoprolol succinate ER  50 mg Oral Daily    morphINE ER  15 mg Oral Q12H    OLANZapine  5 mg Oral Nightly    pantoprazole  40 mg Oral QAM AC    lipase-protease-amylase (Lip-Prot-Amyl)  25,000 Units Oral TID CC    sertraline  200 mg Oral Daily       Assessment & Plan:      #Recurrent bronchial-esophageal fistula  #Stent malposition  - s/p esophageal stent replacement and J-tube placement on 4/9  - nutrition following  - advance TF to goal  - CV urgery consulted  - plan for upper GI series tomorrow  - will have definitive bronchoesophageal fistula repair at [facility] in May    #Metastatic esophageal cancer  - hx of esophagectomy, gastric pull through, esophago-bronchial fistula with multiple stents  - PTA pancreatic enzymes   - PTA pain medications    #R pleural effusion  - pulm following > plan for thoracentesis with IR later today    #CAD w/ hx of CABG  #HTN  - losartan, metoprolol    #Anxiety/depression  - resume olanzapine, sertraline     #Chronic anemia  #Iron deficiency anemia  - Hgb lower than baseline  - IV iron while inpatient          Roxanne Fitch DO    Supplementary Documentation:     Quality:  DVT Mechanical Prophylaxis:        DVT Pharmacologic Prophylaxis   Medication    heparin (Porcine) 100 Units/mL lock flush 500 Units                Code Status: Full Code  Neumann: No urinary catheter in place  Neumann Duration (in days):   Central line:    SHUBHAM:     Discharge is dependent on: clinical state, GI recs  At this point Mr. Cortez is expected to be discharge to: home    The 21st Century Cures Act makes medical notes like these available to patients in the interest of transparency. Please be advised this is a medical document. Medical documents are intended to carry relevant information, facts as evident, and the  clinical opinion of the practitioner. The medical note is intended as peer to peer communication and may appear blunt or direct. It is written in medical language and may contain abbreviations or verbiage that are unfamiliar.

## 2024-04-10 NOTE — PROGRESS NOTES
Gastroenterology Progress Note  Dontae Cortez Jr. Patient Status:  Inpatient    10/15/1970 MRN [id number]   [facility] 4NW-A Attending Roxanne Fitch, DO   Hosp Day # 2 PCP Adrian Horowitz MD     Chief Complaint: Bronchial esophageal fistula   S: Pt tolerated tube feeds via J-tube and sips of clears yesterday and is currently NPO for thoracentesis. No melena, hematochezia, or diarrhea. No vomiting or acute abd pain.   O: BP (!) 164/84 (BP Location: Left arm)   Pulse 64   Temp 97.9 °F (36.6 °C) (Oral)   Resp 18   Ht 6' 2\" (1.88 m)   Wt 150 lb 14.4 oz (68.4 kg)   SpO2 93%   BMI 19.37 kg/m²   Gen: AAOx3; + temporal muscle wasting   CV: RRR with normal S1 / S2  Resp: Diminished bilaterally R>L; No increase in respiratory effort   Abd: (+)BS, soft, non-tender, non-distended; no rebound or guarding; J-tube intact with bumper at 3 cm across the abd wall and no active drainage, edema, or tenderness   Ext: No edema or cyanosis  Skin: Warm and dry  Laboratory Data:     No results for input(s): \"PGLU\" in the last 168 hours.  Recent Labs   Lab 04/10/24  0838   INR 1.31*         Recent Labs   Lab 24  1038 24  0642 04/10/24  0559   WBC 9.0 5.6 4.1   HGB 11.6* 9.6* 9.9*   .0 166.0 184.0       Recent Labs   Lab 24  1038 24  0642    139   K 4.4 4.1    107   CO2 30.0 29.0   BUN 23 14   CREATSERUM 0.71 0.58*       Recent Labs   Lab 24  1038 24  0642   ALT 16 12*   AST 14* 12*     Imaging:  PUSH enteroscopy with Jejunal tube placement, EGD with esophageal stent placement and endoscopic suturing, EGd foreign body retrieval: 2024: Dr Ritchie  FINDINGS:  ESOPHAGUS: Post surgical changes c/w partial esophagectomy, gastric pull through and esophago bronchial fistula without covering, the previously placed fully covered stent proximal end was located distal to the fistula  GASTRIC: Post surgical changes  and gastric pull through, patent pyloric, post POEM scar at antrum  JEJUNUM: Old J tube scar  THERAPEUTICS: We removed the old esophageal stent by gentle traction of proximal end suture with a rat tooth forceps en block with the scope  After good transillumination and good indentation were noted the mesogastrium was prepped with Betadine, was the covered with a fenestrated sterile drape, the skin was numbed using a 22 gauge needle and a 5 cc syringe with 1% lidocaine, a total of 1cc were injected, after this an introducer trocar was advanced into the Jejunal lumen, a guidewire was advanced through the trocar and grasped through the scope with the help of a snare, the wire was removed en block with the scope, after this a percutaneous ostomy tube, [employer] 20 Fr externally removable with a soft internal bolster was introduced with the Pull method, placement was confirmed endoscopically, a small incision was perform on the skin with a scalp to allow passage of the 20 Fr tube, at the end of the procedure the internal bolster was located at 3 cm from the abdominal wall, antibiotic ointment was placed at the external wound. The patient tolerated the procedure well, there were no immediate complication following the procedure.  We then advanced a Savary guidewire through the scope with tip at the gastric lumen, removed the scope and leaving the guidewire in place, we then under direct visualization and fluoroscopic support advanced a [employer] fully covered 23 x 125 mm fully covered stent covering the fistula, we then sutured the proximal end of the stent to the esophagus at two points with single channel endoscopic Apollo suturing device and 2-0 prolene, dynamic contrast injection showed no leak  Assessment: : 53 yr-old male with extensive PMhx including CAD s/p CABG, dyslipidemia, C Diff (10/2022 and 12/2023), and distal esophageal adenocarcinoma (Dx 6/2022) s/p chemo RT followed by laparoscopic robotic  assisted esophagogastrectomy with J-tube placement (9/2022; Dr. Lu) recovery C/B anastomotic leak s/p endoscopic closure with stenting (Dr. Ritchie) with course further C/B GOO s/p EGD with esophageal stenting plus Botox injections (11/2022; Dr. Ritchie).  Patient underwent EUS for pancreatic head mass (4/2023; Dr. Ritchie) with biopsy revealing moderate to poorly differentiated carcinoma and has struggled with recurrent PNA from bronchial esophageal fistula which has failed to resolve despite multiple endoscopic attempts at closure. Now pt with J-tube and new esophageal stent in place. No fevers, overt GI bleeding, acute pain, or tube feed intolerance.   Plan:   OK to resume tube feeds per dietary recommendations once pt cleared for PO intake  OK to advance to a full liquid diet today and tomorrow advance to a soft, post-stent diet. Discussed with pt and bedside RN that PO intake is for pleasure feeds and pt is to be getting nutrition via J-tube   OK to be discharged from a GI perspective. Pt will need to follow-up with Dr Koroma in the near future   Case discussed with SW (Akanksha Helms) to ensure pt will have resources needed at discharge to continue TF at home.   Case discussed with Dr Erma Cooper, ARTEMIO  9:46 AM  4/10/2024  [facility]  [phone]\    Attending physician addendum:   I personally saw and examined the patient, agree with the above findings, assessment and plan. The patient notes some discomfort where J tube was placed. He is tolerating tube feeds. He denies vomiting. He tolerated clears yesterday but is NPO for thoracentesis. His abdomen is soft, NT/NS without rebound or guarding. Recommend FLD today and advance to pleasure feeds tomorrow.     We will sign off at this time    Mayelin Anderson DO  3:37 PM  4/10/2024  [facility]  [phone]

## 2024-04-10 NOTE — DISCHARGE INSTRUCTIONS
Sometimes managing your health at home requires assistance.  The [facility] team has recognized your preference to use [facility].  They can be reached by phone at [phone].  The fax number for your reference is [phone].. A representative from the home health agency will contact you or your family to schedule your first visit.

## 2024-04-10 NOTE — IMAGING NOTE
Received order for R thoracentesis to be reviewed by Dr Navarrete. I spoke w/Josh RN at bedside. He will obtain PT/INR stat.  Pt will be kept NPO, (since midnight) and his tube feedings have been held since 5AM. Pt alert and able to sign consent. Pt on no blood thinners.

## 2024-04-11 ENCOUNTER — APPOINTMENT (OUTPATIENT)
Dept: GENERAL RADIOLOGY | Facility: HOSPITAL | Age: 54
End: 2024-04-11
Attending: STUDENT IN AN ORGANIZED HEALTH CARE EDUCATION/TRAINING PROGRAM
Payer: COMMERCIAL

## 2024-04-11 VITALS
OXYGEN SATURATION: 91 % | WEIGHT: 150.88 LBS | SYSTOLIC BLOOD PRESSURE: 143 MMHG | RESPIRATION RATE: 17 BRPM | TEMPERATURE: 98 F | HEART RATE: 67 BPM | BODY MASS INDEX: 19.36 KG/M2 | DIASTOLIC BLOOD PRESSURE: 80 MMHG | HEIGHT: 74 IN

## 2024-04-11 PROCEDURE — 74240 X-RAY XM UPR GI TRC 1CNTRST: CPT | Performed by: STUDENT IN AN ORGANIZED HEALTH CARE EDUCATION/TRAINING PROGRAM

## 2024-04-11 PROCEDURE — 99239 HOSP IP/OBS DSCHRG MGMT >30: CPT | Performed by: INTERNAL MEDICINE

## 2024-04-11 RX ORDER — MORPHINE SULFATE 2 MG/ML
2 INJECTION, SOLUTION INTRAMUSCULAR; INTRAVENOUS ONCE
Status: COMPLETED | OUTPATIENT
Start: 2024-04-11 | End: 2024-04-11

## 2024-04-11 NOTE — CM/SW NOTE
SW notified [facility] and [facility] of patient being cleared for DC today.     SW requested confirmation from [facility] that they would be able to deliver home TF's to patient's home today. SW also requested verification from [facility] that they would be able to complete SOC tomorrow for home health teach and train.     SW awaiting responses.     SW will continue to follow for plan of care changes and remain available for any additional DC needs or concerns.     Akanksha Helms MSW, LSW  Discharge Planner   [id number]

## 2024-04-11 NOTE — PLAN OF CARE
A&Ox4, VSS and afebrile  Scheduled morphine ER Q12hrs and IV morphine Q2hrs PRN for pain.  Manifest Standard 1.4 Continuous Tube Feeding. increased to 20 mL/hr @ 2247. Stopped feeding and NPO since midnight for UGI (scheduled at 0800).       Problem: PAIN - ADULT  Goal: Verbalizes/displays adequate comfort level or patient's stated pain goal  Description: INTERVENTIONS:  - Encourage pt to monitor pain and request assistance  - Assess pain using appropriate pain scale  - Administer analgesics based on type and severity of pain and evaluate response  - Implement non-pharmacological measures as appropriate and evaluate response  - Consider cultural and social influences on pain and pain management  - Manage/alleviate anxiety  - Utilize distraction and/or relaxation techniques  - Monitor for opioid side effects  - Notify MD/LIP if interventions unsuccessful or patient reports new pain  - Anticipate increased pain with activity and pre-medicate as appropriate  Outcome: Progressing

## 2024-04-11 NOTE — PLAN OF CARE
NURSING DISCHARGE NOTE    Discharged Home via Wheelchair.  Accompanied by RN  Belongings Taken by patient/family.    Discharge instructions given and explained to patient. [employer] formula delivered to bedside prior to departure. J tube flushed and patent. All questions answered.

## 2024-04-11 NOTE — PAYOR COMM NOTE
--------------  4/10 4/11:  CONTINUED STAY REVIEW    Payor: [employer]  Subscriber #:  [id number]  Authorization Number: [id number]    Admit date: 24  Admit time:  1:25 PM         4/10 HOSPITALIST:      Chief Complaint: chest pain, n/v        Subjective:  Feeling much better this morning, coughing/vomiting has improved. Awaiting thoracentesis later today. Hoping to go home tomorrow        Assessment & Plan:  #Recurrent bronchial-esophageal fistula  #Stent malposition  - s/p esophageal stent replacement and J-tube placement on   - nutrition following  - advance TF to goal  - CV urgery consulted  - plan for upper GI series tomorrow  - will have definitive bronchoesophageal fistula repair at [facility] in May     #Metastatic esophageal cancer  - hx of esophagectomy, gastric pull through, esophago-bronchial fistula with multiple stents  - PTA pancreatic enzymes   - PTA pain medications     #R pleural effusion  - pulm following > plan for thoracentesis with IR later today     #CAD w/ hx of CABG  #HTN  - losartan, metoprolol     #Anxiety/depression  - resume olanzapine, sertraline     #Chronic anemia  #Iron deficiency anemia  - Hgb lower than baseline  - IV iron while inpatient       Roxanne Fitch,           Thoracic Surgery Progress Note      Dontae Cortez . is a 53 year old male. MRN [id number]. Admitted 2024     SUBJECTIVE/24H EVENTS:     No acute events overnight. Feels well. No new concerns.         Objective:      VITALS:     Temp (24hrs), Av °F (36.7 °C), Min:97.9 °F (36.6 °C), Max:98 °F (36.7 °C)   /79   Pulse 60   Temp 97.9 °F (36.6 °C) (Oral)   Resp 20   Ht 188 cm (6' 2\")   Wt 150 lb 14.4 oz (68.4 kg)   SpO2 91%   BMI 19.37 kg/m²      EXAM:   General: well appearing male in no acute distress  Heart: regular rate and rhythm.   Lungs: Normal respiratory effort. Equal chest rise bilaterally  Abdomen: Soft, Non-tender, non-distended. J tube in place.    Extremities: No clubbing or cyanosis. No lateralizing weakness  Neuro: no focal defects  Psych:  oriented to person place and time, normal mood and affect        Intake/Output Summary (Last 24 hours) at 4/11/2024 0824  Last data filed at 4/11/2024 0430      Gross per 24 hour   Intake 1725.7 ml   Output 500 ml   Net 1225.7 ml            MEDS:  Scheduled Medications    iron sucrose  200 mg Intravenous Daily    gabapentin  300 mg Oral BID    losartan  50 mg Oral Daily    metoprolol succinate ER  50 mg Oral Daily    morphINE ER  15 mg Oral Q12H    OLANZapine  5 mg Oral Nightly    pantoprazole  40 mg Oral QAM AC    lipase-protease-amylase (Lip-Prot-Amyl)  25,000 Units Oral TID CC    sertraline  200 mg Oral Daily            LABS:        Lab Results   Component Value Date     INR 1.31 04/10/2024            Assessment/Plan:      53 year old male with metastatic esophageal adenocarcinoma s/p esophagectomy 9/30/22 complicated by bronchoesophageal fistula. S/p esophageal stent replacement and J tube placement by Dr. Ritchie on 4/9/24.      -UGI  -NPO until UGI results. If negative for leak, can consider advancing diet. Otherwise would favor keeping patient NPO to decrease risk of surgery delaying infection.   -Advance tube feeds as tolerated to goal.   -Scheduled for bronchoesophageal fistula repair at [place] on 5/28. May be moved earlier to 4/30.      Yohana Jeffrey PA-C  Thoracic Surgery  Pager: [phone]                    MEDICATIONS ADMINISTERED IN LAST 1 DAY:  diphenhydrAMINE (Benadryl) 50 mg/mL  injection 12.5 mg       Date Action Dose Route User    4/11/2024 1114 Given 12.5 mg Intravenous Estrellita Gorman RN    4/11/2024 0619 Given 12.5 mg Intravenous Lubna Smith RN    4/11/2024 0237 Given 12.5 mg Intravenous Lubna Smith RN    4/10/2024 2032 Given 12.5 mg Intravenous Lubna Smith RN    4/10/2024 1609 Given 12.5 mg Intravenous Josh Ghosh RN          gabapentin (Neurontin) cap 300 mg       Date  Action Dose Route User    4/10/2024 2032 Given 300 mg Oral Lubna Smith RN          iron sucrose (Venofer) 20 mg/mL injection 200 mg       Date Action Dose Route User    4/11/2024 0832 New Bag 200 mg Intravenous Estrellita Gorman RN    4/10/2024 1545 New Bag 200 mg Intravenous Josh Ghosh RN          morphINE PF 2 MG/ML injection 2 mg       Date Action Dose Route User    4/11/2024 1114 Given 2 mg Intravenous Estrellita Gorman RN    4/11/2024 0832 Given 2 mg Intravenous Estrellita Gorman RN    4/11/2024 0449 Given 2 mg Intravenous Adilene Zelaya RN    4/11/2024 0236 Given 2 mg Intravenous Lubna Smith RN    4/10/2024 2357 Given 2 mg Intravenous Lubna Smith RN    4/10/2024 1830 Given 2 mg Intravenous Josh Ghosh RN    4/10/2024 1606 Given 2 mg Intravenous Josh Ghosh RN    4/10/2024 1344 Given 2 mg Intravenous Aparna Perez RN          morphINE ER (MS Contin) tab 15 mg       Date Action Dose Route User    4/11/2024 0909 Given 15 mg Oral Estrellita Gorman RN    4/10/2024 2032 Given 15 mg Oral Lubna Smith RN          OLANZapine (ZyPREXA) tab 5 mg       Date Action Dose Route User    4/10/2024 2032 Given 5 mg Oral Lubna Smith RN          ondansetron (Zofran) 4 MG/2ML injection 4 mg       Date Action Dose Route User    4/10/2024 1721 Given 4 mg Intravenous Josh Ghosh RN          sodium chloride 0.9% infusion       Date Action Dose Route User    4/11/2024 0616 New Bag (none) Intravenous Lubna Smith RN    4/10/2024 2034 New Bag (none) Intravenous Lubna Smith RN            Vitals (last day)       Date/Time Temp Pulse Resp BP SpO2 Weight O2 Device O2 Flow Rate (L/min) [facility]    04/11/24 0430 97.9 °F (36.6 °C) 60 20 140/79 91 % -- None (Room air) --     04/10/24 2040 98 °F (36.7 °C) 70 18 142/83 93 % -- None (Room air) -- SF    04/10/24 1236 98 °F (36.7 °C) 60 19 158/82 96 % -- None (Room air) -- MM    04/10/24 0530 97.9 °F (36.6 °C) 64 18 164/84 93 % -- None (Room air) -- SF

## 2024-04-11 NOTE — PROGRESS NOTES
Thoracic Surgery Progress Note     Dontae Cortez Jr. is a 53 year old male. MRN [id number]. Admitted 2024    SUBJECTIVE/24H EVENTS:     No acute events overnight. Feels well. No new concerns.       Objective:     VITALS:     Temp (24hrs), Av °F (36.7 °C), Min:97.9 °F (36.6 °C), Max:98 °F (36.7 °C)   /79   Pulse 60   Temp 97.9 °F (36.6 °C) (Oral)   Resp 20   Ht 188 cm (6' 2\")   Wt 150 lb 14.4 oz (68.4 kg)   SpO2 91%   BMI 19.37 kg/m²     EXAM:   General: well appearing male in no acute distress  Heart: regular rate and rhythm.   Lungs: Normal respiratory effort. Equal chest rise bilaterally  Abdomen: Soft, Non-tender, non-distended. J tube in place.   Extremities: No clubbing or cyanosis. No lateralizing weakness  Neuro: no focal defects  Psych:  oriented to person place and time, normal mood and affect      Intake/Output Summary (Last 24 hours) at 2024 0824  Last data filed at 2024 0430  Gross per 24 hour   Intake 1725.7 ml   Output 500 ml   Net 1225.7 ml         MEDS:   iron sucrose  200 mg Intravenous Daily    gabapentin  300 mg Oral BID    losartan  50 mg Oral Daily    metoprolol succinate ER  50 mg Oral Daily    morphINE ER  15 mg Oral Q12H    OLANZapine  5 mg Oral Nightly    pantoprazole  40 mg Oral QAM AC    lipase-protease-amylase (Lip-Prot-Amyl)  25,000 Units Oral TID CC    sertraline  200 mg Oral Daily       LABS:  Lab Results   Component Value Date    INR 1.31 04/10/2024         Assessment/Plan:     53 year old male with metastatic esophageal adenocarcinoma s/p esophagectomy 22 complicated by bronchoesophageal fistula. S/p esophageal stent replacement and J tube placement by Dr. Ritchie on 24.     -UGI  -NPO until UGI results. If negative for leak, can consider advancing diet. Otherwise would favor keeping patient NPO to decrease risk of surgery delaying infection.   -Advance tube feeds as tolerated to goal.   -Scheduled for bronchoesophageal fistula repair  at [place] on 5/28. May be moved earlier to 4/30.     Yohana Jeffrey PA-C  Thoracic Surgery  Pager: [phone]    UGI reviewed and concerning for leak. Patient to remain NPO until surgery scheduled for 4/30/24 at [place]. Outpatient follow up scheduled for 4/24/24 at [facility] with Dr. Koroma to further discuss surgery. Okay to discharge from our standpoint once tube feeds are at goal.

## 2024-04-11 NOTE — DISCHARGE SUMMARY
[facility]     Dontae Cortez Jr. Patient Status:  Inpatient    10/15/1970 MRN [id number]   Location [facility] 4NW-A Attending Roxanne Fitch, DO   Hosp Day # 3 PCP Adrian Horowitz MD     Date of Admission: 2024  Date of Discharge:   24  Discharge Disposition: Home or Self Care    Discharge Diagnosis:  #Recurrent bronchial-esophageal fistula  #Stent malposition  #Metastatic esophageal cancer  #R pleural effusion  #CAD w/ hx of CABG  #HTN  #Anxiety/depression  #Chronic anemia  #Iron deficiency anemia    History of Present Illness: (per Dr. Tomlinson), Dontae Cortez Jr. is a 53 year old male with history of metastatic esophageal adenocarcinoma with metastasis to the liver bowel pancreatic head status post esophagogastrectomy with gastric pull-through and jejunostomy tube placement 2 years ago.  Patient has had multiple complications including esophageal leak with a soft focal bronchial fistula esophageal stent placement right main stent placement via bronchoscopy status post chemo.  Patient has history hypertension hyperlipidemia CAD status post CABG right shoulder arthroscopy distal clavicle resection history of C. difficile.  Patient is status post EGD on  showing malposition of a ASD occluder that was removed and esophageal stent was placed.  Video swallow still showed concern for fistula or leak and TPN was initiated.  Patient was discharged from a rhinovirus day on  with plans to have J-tube placement by Dr. Ritchie at [facility] on .     Brief Synopsis: admitted with recurrent bronchial-esophageal fistula. GI, pulm, and CV surgery consulted. Pt undwerwent esophageal stent replacement and J-tube placement on  with GI service. His follow up imaging was negative for leak. He was evaluated for thoracentesis of R pleural effusion, but ultrasound was negative and procedure was not needed. His symptoms improved and his TF was  advanced. He was discharged to home in good clinical condition with plan to f/u with CV surgery in outpt setting for definitive bronchoesophageal fistula repair.    Lace+ Score: 80  59-90 High Risk  29-58 Medium Risk  0-28   Low Risk       TCM Follow-Up Recommendation:  LACE > 58: High Risk of readmission after discharge from the hospital.      Procedures during hospitalization:   EGD/esophageal stent replacement/J tube placement    Incidental or significant findings and recommendations (brief descriptions):  As above    Lab/Test results pending at Discharge:   none    Consultants:  GI  CV surgery  pulm    Discharge Medication List:     Discharge Medications        CONTINUE taking these medications        Instructions Prescription details   benzonatate 100 MG Caps  Commonly known as: Tessalon      Take 1 capsule (100 mg total) by mouth 3 (three) times daily as needed for cough.   Quantity: 90 capsule  Refills: 0     Creon [id number] units Cpep  Generic drug: Pancrelipase (Lip-Prot-Amyl)      Take 2 capsules with meals and 1 capsule with snacks   Quantity: 240 capsule  Refills: 0     gabapentin 300 MG Caps  Commonly known as: Neurontin      Take 1 capsule (300 mg total) by mouth in the morning and 1 capsule (300 mg total) before bedtime.   Quantity: 180 capsule  Refills: 0     HYDROcodone-acetaminophen 5-325 MG Tabs  Commonly known as: Norco      Take 1 tablet by mouth every 4 (four) hours as needed for Pain.   Quantity: 60 tablet  Refills: 0     losartan 50 MG Tabs  Commonly known as: Cozaar      Take 1 tablet (50 mg total) by mouth daily.   Quantity: 90 tablet  Refills: 2     metoclopramide 10 MG Tabs  Commonly known as: Reglan      Take 1 tablet (10 mg total) by mouth 4 (four) times daily before meals and nightly.   Stop taking on: Alanna 3, 2024  Quantity: 120 tablet  Refills: 2     metoprolol succinate ER 50 MG Tb24  Commonly known as: Toprol XL      TAKE 1 TABLET BY MOUTH EVERY DAY   Quantity: 90  tablet  Refills: 1     morphINE ER 15 MG Tbcr  Commonly known as: MS Contin      Take 1 tablet (15 mg total) by mouth every 12 (twelve) hours.   Stop taking on: 2024  Quantity: 60 tablet  Refills: 0     OLANZapine 5 MG Tabs  Commonly known as: ZyPREXA      Take 1 tablet (5 mg total) by mouth nightly.   Quantity: 30 tablet  Refills: 3     Omeprazole 40 MG Cpdr      Take 1 capsule (40 mg total) by mouth 2 (two) times daily before meals.   Quantity: 90 capsule  Refills: 3     sertraline 100 MG Tabs  Commonly known as: Zoloft      Take 2 tablets (200 mg total) by mouth daily.   Refills: 0              [place]    Follow-up appointment:   No follow-up provider specified.  Appointments for Next 30 Days 2024 - 2024        Date Arrival Time Visit Type Length Department Provider     2024 12:00 PM  FOLLOW UP-HEM/ONC [6091] 15 min [facility] in [facility]    Patient Instructions:         Location Instructions:     **IF YOU NEED LABWORK OR AN INFUSION ALONG WITH YOUR APPOINTMENT, YOU MUST CALL TO SCHEDULE.**  Your appointment is on the [facility] campus in the [facility] [place]. The address is [address] [place] [address]. Please register at the [facility]  on the second floor.  Masks are optional for all patients and visitors, unless otherwise indicated.                      Vital signs:  Temp:  [97.9 °F (36.6 °C)-98 °F (36.7 °C)] 97.9 °F (36.6 °C)  Pulse:  [60-70] 60  Resp:  [18-20] 20  BP: (140-158)/(79-83) 140/79  SpO2:  [91 %-96 %] 91 %    Physical Exam:    General: No acute distress   Lungs: clear to auscultation  Cardiovascular: S1, S2  Abdomen: Soft    -----------------------------------------------------------------------------------------------  PATIENT DISCHARGE INSTRUCTIONS: See electronic chart    Roxanne Fitch DO    Total time spent on discharge plannin minutes     The  [employer] Cures Act makes medical notes like  these available to patients in the interest of transparency. Please be advised this is a medical document. Medical documents are intended to carry relevant information, facts as evident, and the clinical opinion of the practitioner. The medical note is intended as peer to peer communication and may appear blunt or direct. It is written in medical language and may contain abbreviations or verbiage that are unfamiliar.

## 2024-04-12 ENCOUNTER — PATIENT OUTREACH (OUTPATIENT)
Dept: CASE MANAGEMENT | Age: 54
End: 2024-04-12

## 2024-04-15 ENCOUNTER — PATIENT MESSAGE (OUTPATIENT)
Dept: HEMATOLOGY/ONCOLOGY | Age: 54
End: 2024-04-15

## 2024-04-16 ENCOUNTER — HOSPITAL ENCOUNTER (INPATIENT)
Facility: HOSPITAL | Age: 54
LOS: 4 days | Discharge: HOME HEALTH | End: 2024-04-20
Attending: EMERGENCY MEDICINE | Admitting: HOSPITALIST
Payer: COMMERCIAL

## 2024-04-16 ENCOUNTER — APPOINTMENT (OUTPATIENT)
Dept: CT IMAGING | Facility: HOSPITAL | Age: 54
End: 2024-04-16
Attending: EMERGENCY MEDICINE
Payer: COMMERCIAL

## 2024-04-16 ENCOUNTER — HOSPITAL ENCOUNTER (INPATIENT)
Facility: HOSPITAL | Age: 54
LOS: 4 days | Discharge: HOME | End: 2024-04-20
Attending: EMERGENCY MEDICINE | Admitting: HOSPITALIST
Payer: COMMERCIAL

## 2024-04-16 DIAGNOSIS — C15.9 ESOPHAGEAL CARCINOMA (HCC): ICD-10-CM

## 2024-04-16 DIAGNOSIS — Z85.01 HISTORY OF ESOPHAGEAL CANCER: ICD-10-CM

## 2024-04-16 DIAGNOSIS — G89.3 NEOPLASM RELATED PAIN: ICD-10-CM

## 2024-04-16 DIAGNOSIS — J18.9 PNEUMONIA OF RIGHT LUNG DUE TO INFECTIOUS ORGANISM, UNSPECIFIED PART OF LUNG: Primary | ICD-10-CM

## 2024-04-16 LAB
ALBUMIN SERPL-MCNC: 3 G/DL (ref 3.4–5)
ALBUMIN/GLOB SERPL: 0.6 {RATIO} (ref 1–2)
ALP LIVER SERPL-CCNC: 110 U/L
ALT SERPL-CCNC: 25 U/L
ANION GAP SERPL CALC-SCNC: 7 MMOL/L (ref 0–18)
APTT PPP: 32.5 SECONDS (ref 23.3–35.6)
AST SERPL-CCNC: 27 U/L (ref 15–37)
ATRIAL RATE: 99 BPM
BASOPHILS # BLD: 0 X10(3) UL (ref 0–0.2)
BASOPHILS NFR BLD: 0 %
BILIRUB SERPL-MCNC: 0.3 MG/DL (ref 0.1–2)
BILIRUB UR QL STRIP.AUTO: NEGATIVE
BUN BLD-MCNC: 14 MG/DL (ref 9–23)
CALCIUM BLD-MCNC: 9.4 MG/DL (ref 8.5–10.1)
CHLORIDE SERPL-SCNC: 100 MMOL/L (ref 98–112)
CLARITY UR REFRACT.AUTO: CLEAR
CO2 SERPL-SCNC: 28 MMOL/L (ref 21–32)
COLOR UR AUTO: YELLOW
CREAT BLD-MCNC: 0.6 MG/DL
EGFRCR SERPLBLD CKD-EPI 2021: 115 ML/MIN/1.73M2 (ref 60–?)
EOSINOPHIL # BLD: 0 X10(3) UL (ref 0–0.7)
EOSINOPHIL NFR BLD: 0 %
ERYTHROCYTE [DISTWIDTH] IN BLOOD BY AUTOMATED COUNT: 16 %
FLUAV + FLUBV RNA SPEC NAA+PROBE: NEGATIVE
FLUAV + FLUBV RNA SPEC NAA+PROBE: NEGATIVE
GLOBULIN PLAS-MCNC: 4.9 G/DL (ref 2.8–4.4)
GLUCOSE BLD-MCNC: 136 MG/DL (ref 70–99)
GLUCOSE UR STRIP.AUTO-MCNC: NEGATIVE MG/DL
HCT VFR BLD AUTO: 42.2 %
HGB BLD-MCNC: 13.3 G/DL
INR BLD: 1.13 (ref 0.8–1.2)
KETONES UR STRIP.AUTO-MCNC: NEGATIVE MG/DL
L PNEUMO AG UR QL: NEGATIVE
LACTATE SERPL-SCNC: 1 MMOL/L (ref 0.4–2)
LEUKOCYTE ESTERASE UR QL STRIP.AUTO: NEGATIVE
LIPASE SERPL-CCNC: 11 U/L (ref 13–75)
LYMPHOCYTES NFR BLD: 0.88 X10(3) UL (ref 1–4)
LYMPHOCYTES NFR BLD: 7 %
MCH RBC QN AUTO: 28.7 PG (ref 26–34)
MCHC RBC AUTO-ENTMCNC: 31.5 G/DL (ref 31–37)
MCV RBC AUTO: 91.1 FL
MONOCYTES # BLD: 1.13 X10(3) UL (ref 0.1–1)
MONOCYTES NFR BLD: 9 %
MORPHOLOGY: NORMAL
MRSA DNA SPEC QL NAA+PROBE: NEGATIVE
MYELOCYTES # BLD: 0.13 X10(3) UL
MYELOCYTES NFR BLD: 1 %
NEUTROPHILS # BLD AUTO: 9.65 X10 (3) UL (ref 1.5–7.7)
NEUTROPHILS NFR BLD: 74 %
NEUTS BAND NFR BLD: 9 %
NEUTS HYPERSEG # BLD: 10.38 X10(3) UL (ref 1.5–7.7)
NITRITE UR QL STRIP.AUTO: NEGATIVE
OSMOLALITY SERPL CALC.SUM OF ELEC: 283 MOSM/KG (ref 275–295)
P AXIS: 49 DEGREES
P-R INTERVAL: 122 MS
PH UR STRIP.AUTO: 6.5 [PH] (ref 5–8)
PLATELET # BLD AUTO: 265 10(3)UL (ref 150–450)
POTASSIUM SERPL-SCNC: 4.4 MMOL/L (ref 3.5–5.1)
PROT SERPL-MCNC: 7.9 G/DL (ref 6.4–8.2)
PROT UR STRIP.AUTO-MCNC: NEGATIVE MG/DL
PROTHROMBIN TIME: 14.6 SECONDS (ref 11.6–14.8)
Q-T INTERVAL: 180 MS
Q-T INTERVAL: 340 MS
QRS DURATION: 76 MS
QRS DURATION: 80 MS
QTC CALCULATION (BEZET): 309 MS
QTC CALCULATION (BEZET): 436 MS
R AXIS: 104 DEGREES
R AXIS: 81 DEGREES
RBC # BLD AUTO: 4.63 X10(6)UL
RBC UR QL AUTO: NEGATIVE
RSV RNA SPEC NAA+PROBE: NEGATIVE
SARS-COV-2 RNA RESP QL NAA+PROBE: NOT DETECTED
SODIUM SERPL-SCNC: 135 MMOL/L (ref 136–145)
SP GR UR STRIP.AUTO: 1.02 (ref 1–1.03)
STREP PNEUMO ANTIGEN, URINE: NEGATIVE
T AXIS: 210 DEGREES
T AXIS: 46 DEGREES
TOTAL CELLS COUNTED BLD: 100
TROPONIN I SERPL HS-MCNC: 9 NG/L
UROBILINOGEN UR STRIP.AUTO-MCNC: 0.2 MG/DL
VENTRICULAR RATE: 177 BPM
VENTRICULAR RATE: 99 BPM
WBC # BLD AUTO: 12.5 X10(3) UL (ref 4–11)

## 2024-04-16 PROCEDURE — 71260 CT THORAX DX C+: CPT | Performed by: EMERGENCY MEDICINE

## 2024-04-16 PROCEDURE — 99254 IP/OBS CNSLTJ NEW/EST MOD 60: CPT | Performed by: INTERNAL MEDICINE

## 2024-04-16 PROCEDURE — 99223 1ST HOSP IP/OBS HIGH 75: CPT | Performed by: HOSPITALIST

## 2024-04-16 RX ORDER — METOPROLOL SUCCINATE 50 MG/1
50 TABLET, EXTENDED RELEASE ORAL DAILY
Status: DISCONTINUED | OUTPATIENT
Start: 2024-04-16 | End: 2024-04-17

## 2024-04-16 RX ORDER — BENZONATATE 200 MG/1
200 CAPSULE ORAL 3 TIMES DAILY PRN
Status: DISCONTINUED | OUTPATIENT
Start: 2024-04-16 | End: 2024-04-20

## 2024-04-16 RX ORDER — MORPHINE SULFATE 10 MG/5ML
7.5 SOLUTION ORAL EVERY 6 HOURS SCHEDULED
Status: DISCONTINUED | OUTPATIENT
Start: 2024-04-16 | End: 2024-04-18

## 2024-04-16 RX ORDER — METOCLOPRAMIDE 5 MG/1
10 TABLET ORAL
Status: DISCONTINUED | OUTPATIENT
Start: 2024-04-16 | End: 2024-04-20

## 2024-04-16 RX ORDER — HYDROMORPHONE HYDROCHLORIDE 1 MG/ML
0.2 INJECTION, SOLUTION INTRAMUSCULAR; INTRAVENOUS; SUBCUTANEOUS EVERY 2 HOUR PRN
Status: DISCONTINUED | OUTPATIENT
Start: 2024-04-16 | End: 2024-04-20

## 2024-04-16 RX ORDER — OLANZAPINE 2.5 MG/1
5 TABLET, FILM COATED ORAL NIGHTLY
Status: DISCONTINUED | OUTPATIENT
Start: 2024-04-16 | End: 2024-04-20

## 2024-04-16 RX ORDER — SODIUM CHLORIDE 9 MG/ML
INJECTION, SOLUTION INTRAVENOUS CONTINUOUS
Status: ACTIVE | OUTPATIENT
Start: 2024-04-16 | End: 2024-04-17

## 2024-04-16 RX ORDER — MELATONIN
3 NIGHTLY PRN
Status: DISCONTINUED | OUTPATIENT
Start: 2024-04-16 | End: 2024-04-20

## 2024-04-16 RX ORDER — VANCOMYCIN HYDROCHLORIDE 50 MG/ML
125 KIT ORAL DAILY
Status: DISCONTINUED | OUTPATIENT
Start: 2024-04-16 | End: 2024-04-20

## 2024-04-16 RX ORDER — SENNOSIDES 8.6 MG
17.2 TABLET ORAL NIGHTLY PRN
Status: DISCONTINUED | OUTPATIENT
Start: 2024-04-16 | End: 2024-04-20

## 2024-04-16 RX ORDER — BACLOFEN 10 MG/1
10 TABLET ORAL 3 TIMES DAILY PRN
Status: DISCONTINUED | OUTPATIENT
Start: 2024-04-16 | End: 2024-04-20

## 2024-04-16 RX ORDER — HYDROMORPHONE HYDROCHLORIDE 1 MG/ML
1 INJECTION, SOLUTION INTRAMUSCULAR; INTRAVENOUS; SUBCUTANEOUS EVERY 30 MIN PRN
Status: DISCONTINUED | OUTPATIENT
Start: 2024-04-16 | End: 2024-04-16

## 2024-04-16 RX ORDER — PANTOPRAZOLE SODIUM 40 MG/1
40 TABLET, DELAYED RELEASE ORAL
Status: DISCONTINUED | OUTPATIENT
Start: 2024-04-16 | End: 2024-04-16 | Stop reason: ALTCHOICE

## 2024-04-16 RX ORDER — ACETAMINOPHEN 500 MG
1000 TABLET ORAL EVERY 6 HOURS PRN
Status: DISCONTINUED | OUTPATIENT
Start: 2024-04-16 | End: 2024-04-20

## 2024-04-16 RX ORDER — HYDROMORPHONE HYDROCHLORIDE 1 MG/ML
0.8 INJECTION, SOLUTION INTRAMUSCULAR; INTRAVENOUS; SUBCUTANEOUS EVERY 2 HOUR PRN
Status: DISCONTINUED | OUTPATIENT
Start: 2024-04-16 | End: 2024-04-20

## 2024-04-16 RX ORDER — VANCOMYCIN 1.75 GRAM/500 ML IN 0.9 % SODIUM CHLORIDE INTRAVENOUS
25 ONCE
Status: COMPLETED | OUTPATIENT
Start: 2024-04-16 | End: 2024-04-16

## 2024-04-16 RX ORDER — HEPARIN SODIUM 5000 [USP'U]/ML
5000 INJECTION, SOLUTION INTRAVENOUS; SUBCUTANEOUS EVERY 8 HOURS SCHEDULED
Status: DISCONTINUED | OUTPATIENT
Start: 2024-04-16 | End: 2024-04-20

## 2024-04-16 RX ORDER — SODIUM CHLORIDE 9 MG/ML
1000 INJECTION, SOLUTION INTRAVENOUS ONCE
Status: COMPLETED | OUTPATIENT
Start: 2024-04-16 | End: 2024-04-16

## 2024-04-16 RX ORDER — LANSOPRAZOLE 30 MG/1
30 TABLET, ORALLY DISINTEGRATING, DELAYED RELEASE ORAL
Status: DISCONTINUED | OUTPATIENT
Start: 2024-04-17 | End: 2024-04-20

## 2024-04-16 RX ORDER — GABAPENTIN 300 MG/1
300 CAPSULE ORAL 2 TIMES DAILY
Status: DISCONTINUED | OUTPATIENT
Start: 2024-04-16 | End: 2024-04-20

## 2024-04-16 RX ORDER — MORPHINE SULFATE 15 MG/1
15 TABLET, FILM COATED, EXTENDED RELEASE ORAL EVERY 12 HOURS SCHEDULED
Status: DISCONTINUED | OUTPATIENT
Start: 2024-04-16 | End: 2024-04-17

## 2024-04-16 RX ORDER — BISACODYL 10 MG
10 SUPPOSITORY, RECTAL RECTAL
Status: DISCONTINUED | OUTPATIENT
Start: 2024-04-16 | End: 2024-04-20

## 2024-04-16 RX ORDER — BACLOFEN 10 MG/1
10 TABLET ORAL 3 TIMES DAILY PRN
COMMUNITY

## 2024-04-16 RX ORDER — HYDROMORPHONE HYDROCHLORIDE 1 MG/ML
0.4 INJECTION, SOLUTION INTRAMUSCULAR; INTRAVENOUS; SUBCUTANEOUS EVERY 2 HOUR PRN
Status: DISCONTINUED | OUTPATIENT
Start: 2024-04-16 | End: 2024-04-20

## 2024-04-16 RX ORDER — HYDROMORPHONE HYDROCHLORIDE 1 MG/ML
1 INJECTION, SOLUTION INTRAMUSCULAR; INTRAVENOUS; SUBCUTANEOUS EVERY 30 MIN PRN
Status: COMPLETED | OUTPATIENT
Start: 2024-04-16 | End: 2024-04-16

## 2024-04-16 RX ORDER — IPRATROPIUM BROMIDE AND ALBUTEROL SULFATE 2.5; .5 MG/3ML; MG/3ML
3 SOLUTION RESPIRATORY (INHALATION)
Status: DISCONTINUED | OUTPATIENT
Start: 2024-04-16 | End: 2024-04-20

## 2024-04-16 RX ORDER — ONDANSETRON 2 MG/ML
4 INJECTION INTRAMUSCULAR; INTRAVENOUS EVERY 6 HOURS PRN
Status: DISCONTINUED | OUTPATIENT
Start: 2024-04-16 | End: 2024-04-20

## 2024-04-16 RX ORDER — LOSARTAN POTASSIUM 50 MG/1
50 TABLET ORAL DAILY
Status: DISCONTINUED | OUTPATIENT
Start: 2024-04-16 | End: 2024-04-20

## 2024-04-16 RX ORDER — HYDROCODONE BITARTRATE AND ACETAMINOPHEN 5; 325 MG/1; MG/1
1 TABLET ORAL EVERY 4 HOURS PRN
Status: DISCONTINUED | OUTPATIENT
Start: 2024-04-16 | End: 2024-04-20

## 2024-04-16 NOTE — CONSULTS
[facility]  Pulmonary/Critical Care Consult note    Dontae Cortez Jr. Patient Status:  Inpatient    10/15/1970 MRN [id number]   Location [facility] 1NE-A Attending Eitan Landeros MD   Hosp Day # 0 PCP Adrian Horowitz MD     Reason for Consultation:  Shortness of breath    History of Present Illness:  Dontae Cortez Jr. is a a(n) 53 year old male.  With history of smoking at least 27 pack years of tobacco, history of coronary artery disease status post CABG, GERD, gastroparesis who is status post G-J tube placement, hypertension and hyperlipidemia.  The patient has had multiple admissions to the hospital with symptoms of shortness of breath and cough along with GI difficulties.  The patient presented to the emergency department earlier today with progressive shortness of breath with coughing spells when taking food.  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 Ritchie in 2022.  And metastatic malignancy poorly differentiated carcinoma with mass in pancreas head.  He presented to the emergency department with cough    The patient has been hypoxic in the emergency department so pulmonary consultation was obtained for further evaluation management.    The patient admits to cough and shortness of breath    History:  Past Medical History:    Back problem    Belching    Black stools    Borderline diabetes    Dx in 2013 - HgA1C 6.2%    C. difficile diarrhea    pt treated and without symptoms    Chest pain    Coronary artery disease    On 13: CABG x 4 with LIMA to LAD and SVG to diagonal, OM and PDA    Decorative tattoo    Depression    Difficult intubation    h/o esophagectomy for CA; developed esophagobronchial vistula    Disorder of liver    LIVER CA    Esophageal cancer (HCC)    completed chemo    Esophageal reflux    Essential hypertension    Exposure to medical diagnostic radiation    last tx 2022    Frequent  urination    Gastroparesis    Heartburn    High blood pressure    High cholesterol    Found when I had quadruple bypass    History of COVID-19    asymptomatic - pt was dx during a hospitalization for another diagnosis. No continued symptoms    History of stomach ulcers    Hyperlipidemia    Hyperlipidemia LDL goal < 70    Indigestion    Morbid obesity with BMI of 40.0-44.9, adult (HCC)    Muscle weakness    Nausea vomiting and diarrhea    Nontoxic multinodular goiter    Dx in 8/2013: pt was told that imaging showed thyroid cysts per PCP    Pancreatic cancer (HCC)    last dose 12/4/2023 is scheduled for another round 12/27/23    Peripheral vascular disease (HCC)    pt denies    Personal history of antineoplastic chemotherapy    for esophageal cancer/completed    Personal history of antineoplastic chemotherapy    pancreatic cancer    Personal history of antineoplastic chemotherapy    Last treatment 3/12/24    Problems with swallowing    Pulmonary nodules    Dx in 8/2013: CT chest showed small bilateral fissural-based lung nodules less than 1 cm    S/P CABG x 4    On 8/16/13: CABG x 4 with LIMA to LAD and SVG to diagonal, OM and PDA    Shortness of breath    when coughing; no oxygen    Vomiting     Past Surgical History:   Procedure Laterality Date    Appendectomy      Appendectomy      Arthroscopy of joint unlisted      right shoulder    Cabg      On 8/16/13: CABG x 4 with LIMA to LAD and SVG to diagonal, OM and PDA    Cardiac cath lab      On 8/14/2013: cardiac cath showed 3-vessel disease    Other surgical history      1.       Laparoscopic robotic-assisted esophagogastrectomy.    Port, indwelling, imp       Family History   Problem Relation Age of Onset    Cancer Mother         breast and colon     Diabetes Neg       reports that he quit smoking about 10 years ago. His smoking use included cigarettes. He started smoking about 37 years ago. He has a 27 pack-year smoking history. He has never used smokeless tobacco.  He reports that he does not currently use alcohol. He reports that he does not use drugs.    Allergies:  No Known Allergies    Medications:    Current Facility-Administered Medications:     [Held by provider] metoprolol succinate ER (Toprol XL) 24 hr tab 50 mg, 50 mg, Oral, Daily    losartan (Cozaar) tab 50 mg, 50 mg, Oral, Daily    sertraline (Zoloft) tab 150 mg, 150 mg, Oral, Daily    gabapentin (Neurontin) cap 300 mg, 300 mg, Oral, BID    HYDROcodone-acetaminophen (Norco) 5-325 MG per tab 1 tablet, 1 tablet, Oral, Q4H PRN    [Held by provider] metoclopramide (Reglan) tab 10 mg, 10 mg, Oral, TID AC and HS    OLANZapine (ZyPREXA) tab 5 mg, 5 mg, Oral, Nightly    baclofen (Lioresal) tab 10 mg, 10 mg, Oral, TID PRN    [Held by provider] morphINE ER (MS Contin) tab 15 mg, 15 mg, Oral, Q12H    [Held by provider] pancrelipase (Lip-Prot-Amyl) (Zenpep) DR particles cap 25,000 Units, 25,000 Units, Oral, TID CC    acetaminophen (Tylenol Extra Strength) tab 1,000 mg, 1,000 mg, Oral, Q6H PRN    melatonin tab 3 mg, 3 mg, Oral, Nightly PRN    benzonatate (Tessalon) cap 200 mg, 200 mg, Oral, TID PRN    guaiFENesin (Robitussin) 100 MG/5 ML oral liquid 200 mg, 200 mg, Oral, Q4H PRN    HYDROmorphone (Dilaudid) 1 MG/ML injection 0.2 mg, 0.2 mg, Intravenous, Q2H PRN **OR** HYDROmorphone (Dilaudid) 1 MG/ML injection 0.4 mg, 0.4 mg, Intravenous, Q2H PRN **OR** HYDROmorphone (Dilaudid) 1 MG/ML injection 0.8 mg, 0.8 mg, Intravenous, Q2H PRN    sodium chloride 0.9% infusion, , Intravenous, Continuous    heparin (Porcine) 5000 UNIT/ML injection 5,000 Units, 5,000 Units, Subcutaneous, Q8H INNA    ondansetron (Zofran) 4 MG/2ML injection 4 mg, 4 mg, Intravenous, Q6H PRN    sennosides (Senokot) tab 17.2 mg, 17.2 mg, Oral, Nightly PRN    bisacodyl (Dulcolax) 10 MG rectal suppository 10 mg, 10 mg, Rectal, Daily PRN    piperacillin-tazobactam (Zosyn) 3.375 g in dextrose 5% 100 mL IVPB-ADDV, 3.375 g, Intravenous, Q8H    vancomycin (Firvanq) 50  mg/mL oral solution 125 mg, 125 mg, Oral, Daily    [START ON 4/17/2024] azithromycin (Zithromax) 500 mg in sodium chloride 0.9% 250mL IVPB premix, 500 mg, Intravenous, Q24H    heparin (Porcine) 100 Units/mL lock flush 500 Units, 5 mL, Intravenous, PRN    metoprolol tartrate (Lopressor) tab 25 mg, 25 mg, Per G Tube, 2x Daily(Beta Blocker)    morphINE 10 MG/5ML oral solution 7.5 mg, 7.5 mg, Per G Tube, 4 times per day    [START ON 4/17/2024] lansoprazole (Prevacid Solutab) disintegrating tab 30 mg, 30 mg, Per G Tube, QAM AC    Intake/Output:    Intake/Output Summary (Last 24 hours) at 4/16/2024 1500  Last data filed at 4/16/2024 0831  Gross per 24 hour   Intake 1100 ml   Output --   Net 1100 ml      Body mass index is 19.26 kg/m².    Review of Systems  Review of Systems:   A comprehensive 10 point review of systems was completed.  Pertinent positives and negatives noted in the the HPI.          Patient Vitals for the past 24 hrs:   BP Temp Temp src Pulse Resp SpO2 Height Weight   04/16/24 1107 (!) 151/97 -- -- 63 18 -- -- --   04/16/24 1025 (!) 157/103 -- -- 71 18 92 % -- --   04/16/24 0915 (!) 157/96 -- -- 83 -- 92 % -- --   04/16/24 0913 (!) 170/99 -- -- 95 20 92 % -- --   04/16/24 0911 -- 97.9 °F (36.6 °C) Oral -- -- -- -- --   04/16/24 0845 (!) 161/99 -- -- 90 22 91 % -- --   04/16/24 0830 (!) 142/93 -- -- 75 -- 94 % -- --   04/16/24 0815 (!) 153/127 -- -- 114 -- 95 % -- --   04/16/24 0800 (!) 132/93 -- -- 71 -- 95 % -- --   04/16/24 0745 154/84 -- -- 71 -- 95 % -- --   04/16/24 0600 (!) 149/104 -- -- 74 -- 98 % -- --   04/16/24 0545 (!) 149/98 -- -- 79 -- 98 % -- --   04/16/24 0515 (!) 148/98 -- -- 83 -- 96 % -- --   04/16/24 0512 (!) 143/101 -- -- 84 19 94 % -- --   04/16/24 0330 (!) 137/95 98.1 °F (36.7 °C) Oral 109 18 95 % 6' 2\" (1.88 m) 150 lb (68 kg)     Vitals:    04/16/24 0913 04/16/24 0915 04/16/24 1025 04/16/24 1107   BP: (!) 170/99 (!) 157/96 (!) 157/103 (!) 151/97   BP Location:   Left arm Left arm    Pulse: 95 83 71 63   Resp: 20  18 18   Temp:       TempSrc:       SpO2: 92% 92% 92%    Weight:       Height:             Physical Exam  Constitutional:       General: He is not in acute distress.     Appearance: Normal appearance. He is not ill-appearing or diaphoretic.   HENT:      Head: Normocephalic and atraumatic.      Nose: Nose normal. No congestion or rhinorrhea.      Mouth/Throat:      Mouth: Mucous membranes are moist.      Pharynx: Oropharynx is clear. No oropharyngeal exudate or posterior oropharyngeal erythema.      Comments: Mallampati class II palate  Eyes:      Extraocular Movements: Extraocular movements intact.      Pupils: Pupils are equal, round, and reactive to light.   Cardiovascular:      Rate and Rhythm: Normal rate.      Pulses: Normal pulses.      Heart sounds: Normal heart sounds. No murmur heard.  Pulmonary:      Effort: Pulmonary effort is normal. No respiratory distress.      Breath sounds: Normal breath sounds. No wheezing or rhonchi.      Comments: Diminished breath in the lower lung zones  Chest:      Chest wall: No tenderness.   Abdominal:      General: Abdomen is flat. Bowel sounds are normal.      Palpations: Abdomen is soft.   Musculoskeletal:         General: Normal range of motion.   Skin:     General: Skin is warm.   Neurological:      General: No focal deficit present.      Mental Status: He is alert and oriented to person, place, and time.   Psychiatric:         Mood and Affect: Mood normal.         Behavior: Behavior normal.         Thought Content: Thought content normal.         Judgment: Judgment normal.            Lab Data Review:  Recent Labs   Lab 04/10/24  0559 04/16/24  0420   WBC 4.1 12.5*   HGB 9.9* 13.3   HCT 29.8* 42.2   .0 265.0   EOS  --  0       Recent Labs   Lab 04/16/24  0420   *   K 4.4      CO2 28.0   BUN 14   CREATSERUM 0.60*   CA 9.4   ALB 3.0*   ALKPHO 110   ALT 25   AST 27   *       No results for input(s): \"MG\" in the last  168 hours.    Lab Results   Component Value Date    PHOS 2.9 03/15/2024        Recent Labs   Lab 04/10/24  0838 04/16/24  0420   INR 1.31* 1.13   PTT  --  32.5       No results for input(s): \"ABGPHT\", \"OVGZIP5O\", \"ZEBFY5L\", \"ABGHCO3\", \"ABGBE\", \"TEMP\", \"TACOS\", \"SITE\", \"DEV\", \"THGB\" in the last 168 hours.    No results for input(s): \"TROP\", \"CKMB\" in the last 168 hours.    Cultures: No results found for this visit on 04/16/24.        Radiology personally reviewed:  CT CHEST PE AORTA (IV ONLY) (CPT=71260)    Result Date: 4/16/2024  CONCLUSION:  There is no pulmonary embolism to the segmental arterial level.  Numerous patchy airspace opacities are present within the lungs.  These appear slightly progressed from prior exams, particularly within the right upper lobe.  Multifocal pneumonia is favored.  Secretions are present within airways with areas of mucous plugging likely present.  Aspiration is favored.   LOCATION:  [facility]   Dictated by (CST): Yahir To MD on 4/16/2024 at 6:47 AM     Finalized by (CST): Yahir To MD on 4/16/2024 at 6:53 AM         Patient Active Problem List   Diagnosis    Primary hypertension    Hyperlipidemia with target low density lipoprotein (LDL) cholesterol less than 70 mg/dL    Coronary artery disease involving coronary bypass graft of native heart with angina pectoris (HCC)    S/P CABG x 4    Nontoxic multinodular goiter    Pulmonary nodules    Thrombophlebitis leg    BRANDAN (generalized anxiety disorder)    Right shoulder Arthroscopy acromioplasty ,distal  clavicle resection, rotator cuff/labral debridment  Global 05/13/2021    Right bicipital tenosynovitis    Malignant neoplasm of esophagus (HCC)    Pleural effusion    Esophageal anastomotic leak    Esophageal obstruction    On total parenteral nutrition (TPN)    Mechanical complication of esophagostomy (HCC)    Abdominal pain of unknown etiology    Migration of esophageal stent    Gastroparesis    Esophageal carcinoma (HCC)    Normocytic  anemia    Esophageal fistula    Subacute cough    Acquired bronchoesophageal fistula (HCC)    Pneumonia of both lower lobes due to infectious organism    Malignant neoplasm of pancreas (HCC)    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 difficile colitis    Dysphagia, unspecified type    Dyspnea and respiratory abnormalities    Hypokalemia    Dysphagia    Narcotic drug use    History of esophageal cancer    Palliative care by specialist    Neoplasm related pain    Endobronchial mass    Hyponatremia    Thrombocytopenia (HCC)    Acute kidney injury (HCC)    Hyperglycemia    Aspiration pneumonitis (HCC)    C. difficile diarrhea    Aspiration pneumonia (HCC)    Malignant neoplasm metastatic to liver (HCC)    Hyperlipidemia    Nausea vomiting and diarrhea    Fistula, bronchoesophageal (HCC)    Iron deficiency anemia, unspecified iron deficiency anemia type    Azotemia    Palliative care encounter    Goals of care, counseling/discussion    Cancer related pain    Anemia    Fistula    Acute cough    Pneumonia of right lung due to infectious organism, unspecified part of lung       Assessment:  Acute hypoxemic respiratory failure: Requiring supplemental oxygen 2 L/min by nasal cannula  Patchy bilateral airspace opacities suspect due to multifocal aspiration pneumonia strep pneumonia and Legionella antigen negative, COVID-19, influenza a and B and RSV PCR negative  Shortness of breath  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  Metastatic esophageal Adenocarcinoma  status post esophagectomy  Status post J-tube placement September 2022  History of pancreatic cancer   Coronary artery disease status post  CABG  Hypertension  Depression  Anxiety disorder  Chronic anemia  Hyperlipidemia      Plan:  Wean FiO2 to keep oxygen saturations between 90% to 92%  Continue current antibiotics  DuoNebs every 6 hours  Flutter device to help cough out secretions  DVT prophylaxis:  heparin 5000 units subcutaneous every 12 hours   GI prophylaxis:-  Will follow further recommendations    Thank You for allowing me to participate in this patient's care     Dusty Brooks MD

## 2024-04-16 NOTE — ED QUICK NOTES
Pt awake and alert, skin w/d,resps appear unlabored but pt continues to cough up phlegm.     Pt c/o generalized pain at this time.

## 2024-04-16 NOTE — ED QUICK NOTES
Per house supervisor, pt's bed is ready. Pt requesting additional dose of pain medication at this time.

## 2024-04-16 NOTE — ED INITIAL ASSESSMENT (HPI)
PT WITH C/O SHORTNESS OF BREATH AND ABD PAIN. DUE FOR REPAIR OF ESOPHAGEAL FISTULA IN COUPLE WEEKS.

## 2024-04-16 NOTE — ED QUICK NOTES
PT appears comfortable at this time on cart. Wife at bedside.    Pt and wife aware awaiting transport at this time.

## 2024-04-16 NOTE — ED QUICK NOTES
Orders for admission, patient is aware of plan and ready to go upstairs. Any questions, please call ED RN Dante at extension [phone].     Patient Covid vaccination status: Fully vaccinated and immunocompromised     COVID Test Ordered in ED: SARS-CoV-2/Flu A and B/RSV by PCR ([facility]pert)    COVID Suspicion at Admission: N/A    Running Infusions:    Pt has zithromax hanging and order for vanco once that is complete  Mental Status/LOC at time of transport: A&Ox4    Other pertinent information:   CIWA score: N/A   NIH score:  N/A

## 2024-04-16 NOTE — H&P
[facility]     Dontae Cortez Jr. Patient Status:  Emergency    10/15/1970 MRN [id number]   Location [facility] EMERGENCY DEPARTMENT Attending Martin Doan MD   Hosp Day # 0 PCP Adrian Horowitz MD     Chief Complaint: SOB     Subjective:    History of Present Illness:   Dontae Cortez Jr. is a 53 year old male with increased shortness of breath with coughing spells after taking down food.  He has a history of esophageal tracheal fistula with metastatic malignancy.  Patient has a stent placement from GI team.  Patient continues to cough and feels short of breath.  He was hypoxic in the emergency department.  He denies any chest pain fevers or chills.  No nausea vomiting or diarrhea.    History/Other:    Past Medical History:  Past Medical History:    Back problem    Belching    Black stools    Borderline diabetes    Dx in 2013 - HgA1C 6.2%    C. difficile diarrhea    pt treated and without symptoms    Chest pain    Coronary artery disease    On 13: CABG x 4 with LIMA to LAD and SVG to diagonal, OM and PDA    Decorative tattoo    Depression    Difficult intubation    h/o esophagectomy for CA; developed esophagobronchial vistula    Disorder of liver    LIVER CA    Esophageal cancer (HCC)    completed chemo    Esophageal reflux    Essential hypertension    Exposure to medical diagnostic radiation    last tx 2022    Frequent urination    Gastroparesis    Heartburn    High blood pressure    High cholesterol    Found when I had quadruple bypass    History of COVID-19    asymptomatic - pt was dx during a hospitalization for another diagnosis. No continued symptoms    History of stomach ulcers    Hyperlipidemia    Hyperlipidemia LDL goal < 70    Indigestion    Morbid obesity with BMI of 40.0-44.9, adult (HCC)    Muscle weakness    Nausea vomiting and diarrhea    Nontoxic multinodular goiter    Dx in 2013: pt was told that imaging showed thyroid cysts per PCP     Pancreatic cancer (HCC)    last dose 12/4/2023 is scheduled for another round 12/27/23    Peripheral vascular disease (HCC)    pt denies    Personal history of antineoplastic chemotherapy    for esophageal cancer/completed    Personal history of antineoplastic chemotherapy    pancreatic cancer    Personal history of antineoplastic chemotherapy    Last treatment 3/12/24    Problems with swallowing    Pulmonary nodules    Dx in 8/2013: CT chest showed small bilateral fissural-based lung nodules less than 1 cm    S/P CABG x 4    On 8/16/13: CABG x 4 with LIMA to LAD and SVG to diagonal, OM and PDA    Shortness of breath    when coughing; no oxygen    Vomiting     Past Surgical History:   Past Surgical History:   Procedure Laterality Date    Appendectomy      Appendectomy      Arthroscopy of joint unlisted      right shoulder    Cabg      On 8/16/13: CABG x 4 with LIMA to LAD and SVG to diagonal, OM and PDA    Cardiac cath lab      On 8/14/2013: cardiac cath showed 3-vessel disease    Other surgical history      1.       Laparoscopic robotic-assisted esophagogastrectomy.    Port, indwelling, imp        Family History:   Family History   Problem Relation Age of Onset    Cancer Mother         breast and colon     Diabetes Neg      Social History:    reports that he quit smoking about 10 years ago. His smoking use included cigarettes. He started smoking about 37 years ago. He has a 27 pack-year smoking history. He has never used smokeless tobacco. He reports that he does not currently use alcohol. He reports that he does not use drugs.   Allergies: No Known Allergies  Medications:    No current facility-administered medications on file prior to encounter.     Current Outpatient Medications on File Prior to Encounter   Medication Sig Dispense Refill    OLANZapine 5 MG Oral Tab Take 1 tablet (5 mg total) by mouth nightly. 30 tablet 3    metoclopramide (REGLAN) 10 MG Oral Tab Take 1 tablet (10 mg total) by mouth 4 (four)  times daily before meals and nightly. 120 tablet 2    HYDROcodone-acetaminophen 5-325 MG Oral Tab Take 1 tablet by mouth every 4 (four) hours as needed for Pain. 60 tablet 0    Pancrelipase, Lip-Prot-Amyl, (CREON) 72476-67189 units Oral Cap DR Particles Take 2 capsules with meals and 1 capsule with snacks 240 capsule 0    gabapentin 300 MG Oral Cap Take 1 capsule (300 mg total) by mouth in the morning and 1 capsule (300 mg total) before bedtime. 180 capsule 0    benzonatate 100 MG Oral Cap Take 1 capsule (100 mg total) by mouth 3 (three) times daily as needed for cough. 90 capsule 0    sertraline 100 MG Oral Tab Take 2 tablets (200 mg total) by mouth daily.      losartan 50 MG Oral Tab Take 1 tablet (50 mg total) by mouth daily. 90 tablet 2    Omeprazole 40 MG Oral Capsule Delayed Release Take 1 capsule (40 mg total) by mouth 2 (two) times daily before meals. 90 capsule 3    metoprolol succinate ER 50 MG Oral Tablet 24 Hr TAKE 1 TABLET BY MOUTH EVERY DAY 90 tablet 1     Review of Systems:   A comprehensive review of systems was completed.    Pertinent positives and negatives noted in the HPI.    Objective:   Physical Exam:    BP (!) 142/93   Pulse 75   Temp 98.1 °F (36.7 °C) (Oral)   Resp 19   Ht 6' 2\" (1.88 m)   Wt 150 lb (68 kg)   SpO2 94%   BMI 19.26 kg/m²   General: No acute distress, Alert.  Chronically ill-appearing  Respiratory: Diminished with coarse breath sounds in the right lower lobe . no rhonchi, no wheezes  Cardiovascular: S1, S2. Regular rate and rhythm  Abdomen: Soft, NT/ND, +BS. +J tube   Neuro: No new focal deficits  Extremities: No edema    Results:    Labs:    Labs Last 24 Hours:  Recent Labs   Lab 04/10/24  0559 04/16/24 0420   RBC 3.33* 4.63   HGB 9.9* 13.3   HCT 29.8* 42.2   MCV 89.5 91.1   MCH 29.7 28.7   MCHC 33.2 31.5   RDW 15.8 16.0   NEPRELIM 2.70 9.65*   WBC 4.1 12.5*   .0 265.0     Recent Labs   Lab 04/16/24  0420   *   BUN 14   CREATSERUM 0.60*   EGFRCR 115   CA  9.4   ALB 3.0*   *   K 4.4      CO2 28.0   ALKPHO 110   AST 27   ALT 25   BILT 0.3   TP 7.9     Lab Results   Component Value Date    PT 20.4 (H) 01/30/2014    PT 22.9 (H) 01/13/2014    PT 25.7 (H) 01/08/2014    INR 1.13 04/16/2024    INR 1.31 (H) 04/10/2024    INR 1.21 (H) 03/15/2024     Recent Labs   Lab 04/16/24  0420   TROPHS 9     No results for input(s): \"TROP\", \"PBNP\" in the last 168 hours.  No results for input(s): \"PCT\" in the last 168 hours.  Imaging: Imaging data reviewed in Epic.    Assessment & Plan:      #SOB 2/2 PNA with esoph/trach fisulta s/p stent- aspiration PNA with choking on food   -Pulm and GI consult  -Abx and f/u Cx  -f/u Cx     #HTN  #Recurrent bronchial-esophageal fistula with stent per Dr. Ritchie   #Metastatic esophageal cancer  - hx of esophagectomy, gastric pull through, esophago-bronchial fistula with multiple stents  - PTA pancreatic enzymes when tolerating PO  - PTA pain medications     #CAD w/ hx of CABG  #HTN- losartan, metoprolol when tolerating PO  #Anxiety/depression- resume olanzapine, sertraline when tolerating PO  #Chronic anemia      Quality:  DVT Mechanical Prophylaxis:        DVT Pharmacologic Prophylaxis   Medication   None                Code Status: Full Code  Neumann: No urinary catheter in place  Neumann Duration (in days):   Central line:    SHUBHAM:   Plan of care discussed with home     Eitan Landeros MD  Supplementary Documentation:   The 21st Century Cures Act makes medical notes like these available to patients in the interest of transparency. Please be advised this is a medical document. Medical documents are intended to carry relevant information, facts as evident, and the clinical opinion of the practitioner. The medical note is intended as peer to peer communication and may appear blunt or direct. It is written in medical language and may contain abbreviations or verbiage that are unfamiliar.

## 2024-04-16 NOTE — ED PROVIDER NOTES
Patient Seen in: [facility] Emergency Department      History     Chief Complaint   Patient presents with    Shortness Of Breath     Stated Complaint: SOB, vomiting, esophageal CA, \"hole in airway\"    Subjective:   HPI    Patient comes to the emergency department with shortness of breath and chest and upper abdominal pain.  Patient is also coughing.  The patient has a longstanding history of metastatic esophageal cancer.  Upon review of patient's previous records, the patient has had esophageal pull-through and jejunostomy.  Patient has been frequently in our hospital for various complications related to his esophageal cancer and surgeries.  The patient has not had a fever.  He states that the pain is a dull, constant severe discomfort.  Patient's oral intake is minimal.  Most of his nutrition is via jejunostomy tube.    Objective:   Past Medical History:    Back problem    Belching    Black stools    Borderline diabetes    Dx in 8/2013 - HgA1C 6.2%    C. difficile diarrhea    pt treated and without symptoms    Chest pain    Coronary artery disease    On 8/16/13: CABG x 4 with LIMA to LAD and SVG to diagonal, OM and PDA    Decorative tattoo    Depression    Difficult intubation    h/o esophagectomy for CA; developed esophagobronchial vistula    Disorder of liver    LIVER CA    Esophageal cancer (HCC)    completed chemo    Esophageal reflux    Essential hypertension    Exposure to medical diagnostic radiation    last tx 8/18/2022    Frequent urination    Gastroparesis    Heartburn    High blood pressure    High cholesterol    Found when I had quadruple bypass    History of COVID-19    asymptomatic - pt was dx during a hospitalization for another diagnosis. No continued symptoms    History of stomach ulcers    Hyperlipidemia    Hyperlipidemia LDL goal < 70    Indigestion    Morbid obesity with BMI of 40.0-44.9, adult (HCC)    Muscle weakness    Nausea vomiting and diarrhea    Nontoxic multinodular goiter    Dx  in 8/2013: pt was told that imaging showed thyroid cysts per PCP    Pancreatic cancer (HCC)    last dose 12/4/2023 is scheduled for another round 12/27/23    Peripheral vascular disease (HCC)    pt denies    Personal history of antineoplastic chemotherapy    for esophageal cancer/completed    Personal history of antineoplastic chemotherapy    pancreatic cancer    Personal history of antineoplastic chemotherapy    Last treatment 3/12/24    Problems with swallowing    Pulmonary nodules    Dx in 8/2013: CT chest showed small bilateral fissural-based lung nodules less than 1 cm    S/P CABG x 4    On 8/16/13: CABG x 4 with LIMA to LAD and SVG to diagonal, OM and PDA    Shortness of breath    when coughing; no oxygen    Vomiting              Past Surgical History:   Procedure Laterality Date    Appendectomy      Appendectomy      Arthroscopy of joint unlisted      right shoulder    Cabg      On 8/16/13: CABG x 4 with LIMA to LAD and SVG to diagonal, OM and PDA    Cardiac cath lab      On 8/14/2013: cardiac cath showed 3-vessel disease    Other surgical history      1.       Laparoscopic robotic-assisted esophagogastrectomy.    Port, indwelling, imp                  Social History     Socioeconomic History    Marital status:     Number of children: 3   Occupational History    Occupation: works as  - on workman's comp   Tobacco Use    Smoking status: Former     Current packs/day: 0.00     Average packs/day: 1 pack/day for 27.0 years (27.0 ttl pk-yrs)     Types: Cigarettes     Start date: 8/15/1986     Quit date: 8/15/2013     Years since quitting: 10.6    Smokeless tobacco: Never    Tobacco comments:     Quit smoking 2013   Vaping Use    Vaping status: Never Used   Substance and Sexual Activity    Alcohol use: Not Currently    Drug use: Never    Sexual activity: Not Currently     Partners: Female   Other Topics Concern    Caffeine Concern Yes    Stress Concern No    Weight Concern No    Special Diet No     Exercise No    Seat Belt No   Social History Narrative    Lives with life partner    Has 3 daughters - 1 lives closeby, 1 in [place], 1 in [place]     Social Determinants of Health     Financial Resource Strain: Low Risk  (12/14/2023)    Financial Resource Strain     Difficulty of Paying Living Expenses: Not very hard     Med Affordability: No   Food Insecurity: No Food Insecurity (4/16/2024)    Food Insecurity     Food Insecurity: Never true   Transportation Needs: No Transportation Needs (4/16/2024)    Transportation Needs     Lack of Transportation: No   Housing Stability: Low Risk  (4/16/2024)    Housing Stability     Housing Instability: No     Housing Instability Emergency: No              Review of Systems    Positive for stated complaint: SOB, vomiting, esophageal CA, \"hole in airway\"  Other systems are as noted in HPI.  Constitutional and vital signs reviewed.      All other systems reviewed and negative except as noted above.    Physical Exam     ED Triage Vitals [04/16/24 0330]   BP (!) 137/95   Pulse 109   Resp 18   Temp 98.1 °F (36.7 °C)   Temp src Oral   SpO2 95 %   O2 Device Nasal cannula       Current:BP (!) 151/97 (BP Location: Left arm)   Pulse 63   Temp 97.9 °F (36.6 °C) (Oral)   Resp 18   Ht 188 cm (6' 2\")   Wt 68 kg   SpO2 92%   BMI 19.26 kg/m²         Physical Exam  Vitals and nursing note reviewed.   Constitutional:       Appearance: He is well-developed.   HENT:      Head: Normocephalic.   Cardiovascular:      Rate and Rhythm: Normal rate and regular rhythm.      Heart sounds: Normal heart sounds. No murmur heard.  Pulmonary:      Effort: Pulmonary effort is normal.      Breath sounds: Examination of the right-upper field reveals decreased breath sounds. Examination of the right-middle field reveals decreased breath sounds. Examination of the right-lower field reveals decreased breath sounds. Decreased breath sounds present.      Comments: Coarse breath sounds throughout, particularly on the  right side.  Abdominal:      General: Bowel sounds are normal.      Palpations: Abdomen is soft.      Tenderness: There is no abdominal tenderness. There is no guarding.      Comments: Patient is noted to have jejunostomy tube site that appears intact without any tenderness or discharge.  Patient has mild upper abdominal discomfort to palpation without any peritoneal findings.   Musculoskeletal:         General: No tenderness. Normal range of motion.      Cervical back: Normal range of motion and neck supple.   Lymphadenopathy:      Cervical: No cervical adenopathy.   Skin:     General: Skin is warm and dry.      Findings: No rash.   Neurological:      Mental Status: He is alert and oriented to person, place, and time.      Sensory: No sensory deficit.              ED Course     Labs Reviewed   COMP METABOLIC PANEL (14) - Abnormal; Notable for the following components:       Result Value    Glucose 136 (*)     Sodium 135 (*)     Creatinine 0.60 (*)     Albumin 3.0 (*)     Globulin  4.9 (*)     A/G Ratio 0.6 (*)     All other components within normal limits   LIPASE - Abnormal; Notable for the following components:    Lipase 11 (*)     All other components within normal limits   MANUAL DIFFERENTIAL - Abnormal; Notable for the following components:    Neutrophil Absolute Manual 10.38 (*)     Lymphocyte Absolute Manual 0.88 (*)     Monocyte Absolute Manual 1.13 (*)     Myelocyte Absolute Manual 0.13 (*)     Platelet Morphology See morphology below (*)     Clumped Platelets 1+ (*)     All other components within normal limits   CBC W/ DIFFERENTIAL - Abnormal; Notable for the following components:    WBC 12.5 (*)     Neutrophil Absolute Prelim 9.65 (*)     All other components within normal limits   LACTIC ACID, PLASMA - Normal   TROPONIN I HIGH SENSITIVITY - Normal   PROTHROMBIN TIME (PT) - Normal   PTT, ACTIVATED - Normal   LEGIONELLA URINE AG SEROGRP 1 - Normal    Narrative:     Presumptive negative for Legionella  pneumophila serogroup 1 antigen in urine, suggesting no recent or current infection. Infection due to Legionella cannot be ruled out since other serogroups and species may cause disease, antigen may not be present in early infection, or the level of antigen present in the urine may be below the detection limit of the test.   STREPTOCOCCUS PNEUMONIAE AG, URINE - Normal    Narrative:     Presumptive negative. A negative result does not exclude infection with Streptococcus pneumoniae. The result of this test as well as culture results should be used in conjunction with clinical findings to make an accurate diagnosis.   SARS-COV-2/FLU A AND B/RSV BY PCR ([employer]) - Normal    Narrative:     This test is intended for the qualitative detection and differentiation of SARS-CoV-2, influenza A, influenza B, and respiratory syncytial virus (RSV) viral RNA in nasopharyngeal or nares swabs from individuals suspected of respiratory viral infection consistent with COVID-19 by their healthcare provider. Signs and symptoms of respiratory viral infection due to SARS-CoV-2, influenza, and RSV can be similar.    Test performed using the Xpert Xpress SARS-CoV-2/FLU/RSV (real time RT-PCR)  assay on the [employer] instrument, [address], [place] [address].   This test is being used under the Food and Drug Administration's Emergency Use Authorization.    The authorized Fact Sheet for Healthcare Providers for this assay is available upon request from the laboratory.   ED/MRSA SCREEN BY PCR-CC - Normal   CBC WITH DIFFERENTIAL WITH PLATELET    Narrative:     The following orders were created for panel order CBC With Differential With Platelet.  Procedure                               Abnormality         Status                     ---------                               -----------         ------                     CBC W/ DIFFERENTIAL[[id number]]          Abnormal            Final result                 Please view results for these tests on  the individual orders.   URINALYSIS, ROUTINE   BLOOD CULTURE   BLOOD CULTURE   SPUTUM CULTURE   URINE CULTURE, ROUTINE     EKG    Rate, intervals and axes as noted on EKG Report.  Rate: 99  Rhythm: Sinus Rhythm  Reading: No acute ST or T wave abnormalities.              ED Course as of 04/16/24 1347  ------------------------------------------------------------  Time: 04/16 0517  Comment: Chemistries and CBC were unremarkable.  ------------------------------------------------------------  Time: 04/16 0517  Value: LACTIC ACID: 1.0  Comment: (Reviewed)  ------------------------------------------------------------  Time: 04/16 0705  Value: CT CHEST PE AORTA (IV ONLY) (CPT=71260)  Comment: No pulmonary embolus is noted.  Increasing patchy infiltrate is noted consistent with pneumonia.              MDM      Patient comes to the emergency department with chest pain, shortness of breath and cough in the setting of esophageal cancer and documented esophago tracheal fistula.  Differential diagnosis includes pulmonary embolus, pneumonia, pleural effusion or acute mediastinal pathology.  In the emergency department, the patient appeared stable, but continued to be somewhat dyspneic and was coughing.  Pneumonia was noted on CT scan.  No pulmonary embolus was noted on CT scan.  Antibiotics were initiated in the emergency department and patient was hospitalized for further IV antibiotics.  Admission disposition: 4/16/2024  7:19 AM                                        Medical Decision Making      Disposition and Plan     Clinical Impression:  1. Pneumonia of right lung due to infectious organism, unspecified part of lung         Disposition:  Admit  4/16/2024  7:19 am    Follow-up:  No follow-up provider specified.        Medications Prescribed:  Current Discharge Medication List                            Hospital Problems       Present on Admission  Date Reviewed: 3/26/2024            ICD-10-CM Noted POA    * (Principal)  Pneumonia of right lung due to infectious organism, unspecified part of lung J18.9 4/16/2024 Unknown    Aspiration pneumonia (HCC) J69.0 3/6/2024 Yes    Malignant neoplasm of esophagus (HCC) C15.9 6/16/2022 Yes    Malignant neoplasm of pancreas (HCC) C25.9 11/26/2023 Yes    Primary hypertension I10 6/29/2012 Yes    S/P CABG x 4 Z95.1 Unknown Yes    Overview Addendum 8/17/2013  5:49 PM by Anyi Vernon     On 8/16/13: CABG x 4 with LIMA to LAD and SVG to diagonal, OM and PDA

## 2024-04-16 NOTE — CONSULTS
[facility] Pharmacy Dosing Service for ED   Vancomycin Dosing    Dontae Cortez Jr. is a 53 year old for whom pharmacy is dosing Vancomycin X 1 dose in ER for treatment of  pneumonia. .      Allergies: has No Known Allergies.    Vitals: BP (!) 153/127   Pulse 114   Temp 98.1 °F (36.7 °C) (Oral)   Resp 19   Ht 1.88 m (6' 2\")   Wt 68 kg (150 lb)   SpO2 95%   BMI 19.26 kg/m²     Labs:   WBC   Date Value Ref Range Status   04/16/2024 12.5 (H) 4.0 - 11.0 x10(3) uL Final     Cultures: pending    Radiology: PNA > aspiration    Assessment/Plan: Will give Vancomycin 1.75 gm ( 25 mg/kg) X 1 dose IV in ER    Pharmacy appreciates the opportunity to assist in the care of this patient.    Thank you,   Antonia Haddad, PharmD  4/16/2024  8:30 AM

## 2024-04-16 NOTE — CONSULTS
[facility]                       [facility]    Dontae Buddy Cortez  Patient Status:  Inpatient    10/15/1970 MRN [id number]   Location [facility] 1NE-A Attending Eitan Landeros MD   Hosp Day # 0 PCP Adrian Horowitz MD     Reason for consultation: Bronchial esophageal fistula; recurrent PNA  HPI: This is a 53-year-old male with an extensive PMHx that includes CAD s/p CABG, dyslipidemia, CHF (10/20/2022 and 2023), and distal esophageal adenocarcinoma (Dx 2022) s/p chemo RT followed by laparoscopic robotic assisted esophagogastrectomy with J-tube placement (2022; Dr. Lu) recovery C/B anastomotic leak s/p endoscopic closure with stenting (Dr. Ritchie) with course further C/B GOO s/p EGD with esophageal stenting plus Botox injections (2022; Dr. Ritchie).  Patient known to have pancreatic head mass (Dx 2023) with biopsy revealing moderate to poorly differentiated carcinoma.  Patient well-known to GI service from multiple admissions since 2023 for recurrent PNA/cough found to have severe ulceration at esophagogastric anastomosis with a bronchial fistula tract opening.  Pt completed several endoscopic procedures at attempted closure with esophageal stents, bronchial stents, and more recently ASD occluder x 2 (off label use).  Most recently, patient underwent J-tube placement and new esophageal stent placement (2024; Dr. Ritchie).  Patient was discharged tolerating tube feeds via J-tube and returned today with severe coughing and dyspnea found to have multifocal pneumonia per imaging.  Patient reports tolerating J-tube feeds x 20 hours daily without difficulty and has been taking in several small meals by mouth--soup, sandwiches, hummus with vegetables.  He reports a fair appetite and has been taking in small portions throughout the day.  No melena or hematochezia.  No regurgitation of food.  PMHx:   Past Medical History:    Back problem     Belching    Black stools    Borderline diabetes    Dx in 8/2013 - HgA1C 6.2%    C. difficile diarrhea    pt treated and without symptoms    Chest pain    Coronary artery disease    On 8/16/13: CABG x 4 with LIMA to LAD and SVG to diagonal, OM and PDA    Decorative tattoo    Depression    Difficult intubation    h/o esophagectomy for CA; developed esophagobronchial vistula    Disorder of liver    LIVER CA    Esophageal cancer (HCC)    completed chemo    Esophageal reflux    Essential hypertension    Exposure to medical diagnostic radiation    last tx 8/18/2022    Frequent urination    Gastroparesis    Heartburn    High blood pressure    High cholesterol    Found when I had quadruple bypass    History of COVID-19    asymptomatic - pt was dx during a hospitalization for another diagnosis. No continued symptoms    History of stomach ulcers    Hyperlipidemia    Hyperlipidemia LDL goal < 70    Indigestion    Morbid obesity with BMI of 40.0-44.9, adult (HCC)    Muscle weakness    Nausea vomiting and diarrhea    Nontoxic multinodular goiter    Dx in 8/2013: pt was told that imaging showed thyroid cysts per PCP    Pancreatic cancer (HCC)    last dose 12/4/2023 is scheduled for another round 12/27/23    Peripheral vascular disease (HCC)    pt denies    Personal history of antineoplastic chemotherapy    for esophageal cancer/completed    Personal history of antineoplastic chemotherapy    pancreatic cancer    Personal history of antineoplastic chemotherapy    Last treatment 3/12/24    Problems with swallowing    Pulmonary nodules    Dx in 8/2013: CT chest showed small bilateral fissural-based lung nodules less than 1 cm    S/P CABG x 4    On 8/16/13: CABG x 4 with LIMA to LAD and SVG to diagonal, OM and PDA    Shortness of breath    when coughing; no oxygen    Vomiting                PSHx:   Past Surgical History:   Procedure Laterality Date    Appendectomy      Appendectomy      Arthroscopy of joint unlisted      right  shoulder    Cabg      On 8/16/13: CABG x 4 with LIMA to LAD and SVG to diagonal, OM and PDA    Cardiac cath lab      On 8/14/2013: cardiac cath showed 3-vessel disease    Other surgical history      1.       Laparoscopic robotic-assisted esophagogastrectomy.    Port, indwelling, imp       Medications:    [COMPLETED] HYDROmorphone (Dilaudid) 1 MG/ML injection 1 mg  1 mg Intravenous Q30 Min PRN    [COMPLETED] sodium chloride 0.9 % IV bolus 1,000 mL  1,000 mL Intravenous Once    [COMPLETED] iopamidol 76% (ISOVUE-370) injection for power injector  80 mL Intravenous ONCE PRN    [COMPLETED] azithromycin (Zithromax) 500 mg in sodium chloride 0.9% 250mL IVPB premix  500 mg Intravenous Once    [COMPLETED] piperacillin-tazobactam (Zosyn) 4.5 g in dextrose 5% 100 mL IVPB-ADDV  4.5 g Intravenous Once    [COMPLETED] sodium chloride 0.9% infusion 1,000 mL  1,000 mL Intravenous Once    [COMPLETED] HYDROmorphone (Dilaudid) 1 MG/ML injection 1 mg  1 mg Intravenous Q30 Min PRN    vancomycin (Vancocin) 1.75 g in sodium chloride 0.9% 500mL IVPB premix  25 mg/kg Intravenous Once    metoprolol succinate ER (Toprol XL) 24 hr tab 50 mg  50 mg Oral Daily    losartan (Cozaar) tab 50 mg  50 mg Oral Daily    sertraline (Zoloft) tab 150 mg  150 mg Oral Daily    gabapentin (Neurontin) cap 300 mg  300 mg Oral BID    HYDROcodone-acetaminophen (Norco) 5-325 MG per tab 1 tablet  1 tablet Oral Q4H PRN    metoclopramide (Reglan) tab 10 mg  10 mg Oral TID AC and HS    OLANZapine (ZyPREXA) tab 5 mg  5 mg Oral Nightly    baclofen (Lioresal) tab 10 mg  10 mg Oral TID PRN    morphINE ER (MS Contin) tab 15 mg  15 mg Oral Q12H    pantoprazole (Protonix) DR tab 40 mg  40 mg Oral QAM AC    pancrelipase (Lip-Prot-Amyl) (Zenpep) DR particles cap 25,000 Units  25,000 Units Oral TID CC    acetaminophen (Tylenol Extra Strength) tab 1,000 mg  1,000 mg Oral Q6H PRN    melatonin tab 3 mg  3 mg Oral Nightly PRN    benzonatate (Tessalon) cap 200 mg  200 mg Oral TID PRN     guaiFENesin (Robitussin) 100 MG/5 ML oral liquid 200 mg  200 mg Oral Q4H PRN    HYDROmorphone (Dilaudid) 1 MG/ML injection 0.2 mg  0.2 mg Intravenous Q2H PRN    Or    HYDROmorphone (Dilaudid) 1 MG/ML injection 0.4 mg  0.4 mg Intravenous Q2H PRN    Or    HYDROmorphone (Dilaudid) 1 MG/ML injection 0.8 mg  0.8 mg Intravenous Q2H PRN    sodium chloride 0.9% infusion   Intravenous Continuous    heparin (Porcine) 5000 UNIT/ML injection 5,000 Units  5,000 Units Subcutaneous Q8H INNA    ondansetron (Zofran) 4 MG/2ML injection 4 mg  4 mg Intravenous Q6H PRN    sennosides (Senokot) tab 17.2 mg  17.2 mg Oral Nightly PRN    bisacodyl (Dulcolax) 10 MG rectal suppository 10 mg  10 mg Rectal Daily PRN    piperacillin-tazobactam (Zosyn) 3.375 g in dextrose 5% 100 mL IVPB-ADDV  3.375 g Intravenous Q8H    vancomycin (Vancocin) cap 125 mg  125 mg Oral Daily    azithromycin (Zithromax) 500 mg in sodium chloride 0.9% 250mL IVPB premix  500 mg Intravenous Q24H     Allergies: No Known Allergies  Social HX:   Social History     Socioeconomic History    Marital status:     Number of children: 3   Occupational History    Occupation: works as  - on workman's comp   Tobacco Use    Smoking status: Former     Current packs/day: 0.00     Average packs/day: 1 pack/day for 27.0 years (27.0 ttl pk-yrs)     Types: Cigarettes     Start date: 8/15/1986     Quit date: 8/15/2013     Years since quitting: 10.6    Smokeless tobacco: Never    Tobacco comments:     Quit smoking 2013   Vaping Use    Vaping status: Never Used   Substance and Sexual Activity    Alcohol use: Not Currently    Drug use: Never    Sexual activity: Not Currently     Partners: Female   Other Topics Concern    Caffeine Concern Yes    Stress Concern No    Weight Concern No    Special Diet No    Exercise No    Seat Belt No   Social History Narrative    Lives with life partner    Has 3 daughters - 1 lives closeby, 1 in [place], 1 in [place]     Social Determinants of Health      Financial Resource Strain: Low Risk  (12/14/2023)    Financial Resource Strain     Difficulty of Paying Living Expenses: Not very hard     Med Affordability: No   Food Insecurity: No Food Insecurity (4/16/2024)    Food Insecurity     Food Insecurity: Never true   Transportation Needs: No Transportation Needs (4/16/2024)    Transportation Needs     Lack of Transportation: No   Housing Stability: Low Risk  (4/16/2024)    Housing Stability     Housing Instability: No     Housing Instability Emergency: No           Infectious Disease:  + recurrent PNA, C Diff (10/2022 and 12/2023)            Cardiovascular: + CAD s/p CABG, HTN, dyslipidemia             Respiratory: + recurrent pneumonia            Hematologic: The patient reports no easy bruising, frequent gum bleeding or nose bleeding;  The patient has no history of known chronic anemia            Dermatologic: The patient reports no recent rashes or chronic skin disorders            Rheumatologic: The patient reports no history of chronic arthritis, myalgias, arthralgias            Genitourinary:  The patient reports no history of recurrent urinary tract infections, hematuria, dysuria, or nephrolithiasis           Psychiatric: + depression           Oncologic: + distal esophageal adenocarcinoma            ENT: The patient reports no hoarseness of voice, hearing loss, sinus congestion, tinnitus           Neurologic: The patient reports no history of seizure, stroke, or frequent headaches  PE: BP (!) 151/97 (BP Location: Left arm)   Pulse 63   Temp 97.9 °F (36.6 °C) (Oral)   Resp 18   Ht 6' 2\" (1.88 m)   Wt 150 lb (68 kg)   SpO2 92%   BMI 19.26 kg/m²   Gen: AAO x 3, able to speak in complete sentences; + temporal muscle wasting  HENT: EOMI, PERRL, oropharynx is clear with moist mucosal membranes  Eyes: Sclerae are anicteric  Neck:  Supple without nuchal rigidity  CV: Regular rate and rhythm, with normal S1 and S2  Resp: Coarse, diminished bilaterally with  frequent productive cough (thick white sputum)  Abdomen: Soft, non-tender, non-distended with the presence of bowel sounds; No hepatosplenomegaly; no rebound or guarding; No ascites is clinically apparent; no tympany to percussion; J-tube intact without drainage, edema, or erythema  Ext: No peripheral edema or cyanosis  Skin: Warm and dry  Psychiatric: Appropriate mood and congruent affect without obvious depression or anxiety  Labs:   Lab Results   Component Value Date    WBC 12.5 04/16/2024    HGB 13.3 04/16/2024    HCT 42.2 04/16/2024    .0 04/16/2024    CREATSERUM 0.60 04/16/2024    BUN 14 04/16/2024     04/16/2024    K 4.4 04/16/2024     04/16/2024    CO2 28.0 04/16/2024     04/16/2024    CA 9.4 04/16/2024    ALB 3.0 04/16/2024    ALKPHO 110 04/16/2024    BILT 0.3 04/16/2024    AST 27 04/16/2024    ALT 25 04/16/2024    PTT 32.5 04/16/2024    INR 1.13 04/16/2024    PTP 14.6 04/16/2024    LIP 11 04/16/2024     Recent Labs   Lab 04/16/24 0420   *   BUN 14   CREATSERUM 0.60*   CA 9.4   *   K 4.4      CO2 28.0     Recent Labs   Lab 04/16/24 0420   RBC 4.63   HGB 13.3   HCT 42.2   MCV 91.1   MCH 28.7   MCHC 31.5   RDW 16.0   NEPRELIM 9.65*   WBC 12.5*   .0       Recent Labs   Lab 04/16/24  0420   ALT 25   AST 27       Imaging:   PROCEDURE:  CT CHEST PE AORTA (IV ONLY) (CPT=71260)     COMPARISON:  [facility] , CT, CT CHEST+ABDOMEN (ALL CNTRST ONLY) (LWP=11351/29901), 3/06/2024, 4:42 PM.     INDICATIONS:  SOB, vomiting, esophageal CA, hole in airway     TECHNIQUE:  CT images were obtained with non-ionic intravenous contrast material. Dose reduction techniques were used. Dose information is transmitted to the ACR (American College of Radiology) NRDR (National Radiology Data Registry) which includes the  Dose Index Registry.     PATIENT STATED HISTORY:(As transcribed by Technologist)  SOB Vomitting      CONTRAST USED:  80cc of Isovue 370     FINDINGS:    LUNGS:  There are  secretions present within the left mainstem bronchi and distal right lower lobe airways.  Motion artifact limits assessment of the lungs.  Numerous nodular airspace opacities are present within the lung bases.  These are similar to the  prior exam.  A representative left lower lobe nodule measures up to 9 x 10 mm.  There are numerous other areas present with slight interval progression of airspace opacity of the right upper lobe along the right minor fissure and posteriorly.  These are  favored to represent areas of pneumonia.    VASCULATURE:  There is no pulmonary embolism to the segmental arterial level.  AIDAN:  No mass or adenopathy.    MEDIASTINUM:  There is an esophageal stent present.  CARDIAC:  No enlargement, pericardial thickening, or significant coronary artery calcification.  PLEURA:  No mass or effusion.    THORACIC AORTA:  No aneurysm.    CHEST WALL:  No mass or axillary adenopathy.    LIMITED ABDOMEN:  Limited images of the upper abdomen are unremarkable.    BONES:  No bony lesion or fracture.        Impression   CONCLUSION:  There is no pulmonary embolism to the segmental arterial level.     Numerous patchy airspace opacities are present within the lungs.  These appear slightly progressed from prior exams, particularly within the right upper lobe.  Multifocal pneumonia is favored.  Secretions are present within airways with areas of mucous  plugging likely present.  Aspiration is favored.     LOCATION:  [facility]     Dictated by (CST): Yahir To MD on 4/16/2024 at 6:47 AM      Finalized by (CST): Yahir To MD on 4/16/2024 at 6:53 AM      Impression: 53-year-old male with an extensive Hx that includes distal esophageal adenocarcinoma s/p chemo RT followed by laparoscopic robotic assisted esophagogastrectomy with J-tube placement (9/2022) recovery C/B anastomotic leak s/p endoscopic closure with stenting with course further C/B goo s/p EGD with esophageal stenting plus Botox injections who has struggled  with recurrent PNA from a bronchial esophageal fistula which has failed to resolve despite multiple endoscopic attempts at closure.    Patient now readmitted with PNA after new esophageal stent placed last week and J-tube in place.  CT chest reviewed with Dr. Ritchie (interventional GI) and stent appears to be in appropriate position.  Recommend nothing by mouth and resuming J-tube feeds per dietary recommendations.  Per Dr. Ritchie, no plan for further endoscopic evaluation at this time    Recommendations:     Okay to resume tube feeds per J-tube per dietary recommendations  Nothing by mouth  ABX per hospitalist recommendations  No further endoscopic procedures planned at this time  Strongly recommend follow-up with Dr. Chris     Attending addendum (Dr HENRIETTA Sierra) to follow later today and provide formal, final recommendations at that time   ARTEMIO Hood  11:44 AM  4/16/2024  [facility]  [phone]    GI attending addendum:    I have personally seen and examined this patient and agree with above nurse practitioner's assessment and recommendations.     Briefly, this is a 53-year-old male with an extensive PMHx that includes CAD s/p CABG, dyslipidemia, CHF (10/20/2022 and 12/20/2023), and distal esophageal adenocarcinoma (Dx 6/2022) s/p chemo RT followed by laparoscopic robotic assisted esophagogastrectomy with J-tube placement (9/2022; Dr. Lu) recovery C/B anastomotic leak s/p endoscopic closure with stenting (Dr. Ritchie) with course further C/B GOO s/p EGD with esophageal stenting plus Botox injections (11/2022; Dr. Ritchie) presenting with recurrent PNA.  Patient underwent J-tube placement and new esophageal stent placement (4/9/2024; Dr. Ritchie).  Patient admitted today with multifocal pneumonia.  Patient has been taking in small meals by mouth including solids such as hummus and vegetables.    Assessment and Plan:  -Imaging reviewed with Dr. Ritchie (interventional GI)  with assessment that stent appears to be in appropriate position  -No acute GI intervention recommended per interventional GI  -Recommend strict n.p.o.  -Continue antibiotics per hospitalist  -Okay to resume J-tube    GI will sign off at this time. Please call back with any questions or concerns.     Meng Sierra MD, 04/16/24, 7:15 PM  [facility]  [phone]

## 2024-04-17 LAB
ANION GAP SERPL CALC-SCNC: 5 MMOL/L (ref 0–18)
BASOPHILS # BLD: 0.09 X10(3) UL (ref 0–0.2)
BASOPHILS NFR BLD: 2 %
BUN BLD-MCNC: 7 MG/DL (ref 9–23)
CALCIUM BLD-MCNC: 8.6 MG/DL (ref 8.5–10.1)
CHLORIDE SERPL-SCNC: 105 MMOL/L (ref 98–112)
CO2 SERPL-SCNC: 29 MMOL/L (ref 21–32)
CREAT BLD-MCNC: 0.49 MG/DL
EGFRCR SERPLBLD CKD-EPI 2021: 122 ML/MIN/1.73M2 (ref 60–?)
EOSINOPHIL # BLD: 0.14 X10(3) UL (ref 0–0.7)
EOSINOPHIL NFR BLD: 3 %
ERYTHROCYTE [DISTWIDTH] IN BLOOD BY AUTOMATED COUNT: 15.9 %
GLUCOSE BLD-MCNC: 100 MG/DL (ref 70–99)
GLUCOSE BLD-MCNC: 108 MG/DL (ref 70–99)
GLUCOSE BLD-MCNC: 74 MG/DL (ref 70–99)
GLUCOSE BLD-MCNC: 97 MG/DL (ref 70–99)
HCT VFR BLD AUTO: 34.6 %
HGB BLD-MCNC: 10.7 G/DL
LYMPHOCYTES NFR BLD: 0.78 X10(3) UL (ref 1–4)
LYMPHOCYTES NFR BLD: 17 %
MCH RBC QN AUTO: 28.6 PG (ref 26–34)
MCHC RBC AUTO-ENTMCNC: 30.9 G/DL (ref 31–37)
MCV RBC AUTO: 92.5 FL
METAMYELOCYTES # BLD: 0.05 X10(3) UL
METAMYELOCYTES NFR BLD: 1 %
MONOCYTES # BLD: 0.55 X10(3) UL (ref 0.1–1)
MONOCYTES NFR BLD: 12 %
MORPHOLOGY: NORMAL
NEUTROPHILS # BLD AUTO: 2.71 X10 (3) UL (ref 1.5–7.7)
NEUTROPHILS NFR BLD: 62 %
NEUTS BAND NFR BLD: 3 %
NEUTS HYPERSEG # BLD: 2.99 X10(3) UL (ref 1.5–7.7)
OSMOLALITY SERPL CALC.SUM OF ELEC: 286 MOSM/KG (ref 275–295)
PLATELET # BLD AUTO: 169 10(3)UL (ref 150–450)
PLATELET MORPHOLOGY: NORMAL
POTASSIUM SERPL-SCNC: 3.9 MMOL/L (ref 3.5–5.1)
PROCALCITONIN SERPL-MCNC: <0.05 NG/ML (ref ?–0.16)
RBC # BLD AUTO: 3.74 X10(6)UL
SODIUM SERPL-SCNC: 139 MMOL/L (ref 136–145)
TOTAL CELLS COUNTED BLD: 100
WBC # BLD AUTO: 4.6 X10(3) UL (ref 4–11)

## 2024-04-17 PROCEDURE — 99233 SBSQ HOSP IP/OBS HIGH 50: CPT | Performed by: INTERNAL MEDICINE

## 2024-04-17 PROCEDURE — 99232 SBSQ HOSP IP/OBS MODERATE 35: CPT | Performed by: INTERNAL MEDICINE

## 2024-04-17 RX ORDER — SODIUM BICARBONATE 325 MG/1
325 TABLET ORAL AS NEEDED
Status: DISCONTINUED | OUTPATIENT
Start: 2024-04-17 | End: 2024-04-20

## 2024-04-17 RX ORDER — ACETYLCYSTEINE 200 MG/ML
3 SOLUTION ORAL; RESPIRATORY (INHALATION)
Status: DISPENSED | OUTPATIENT
Start: 2024-04-17 | End: 2024-04-19

## 2024-04-17 RX ORDER — DEXTROSE MONOHYDRATE, SODIUM CHLORIDE, AND POTASSIUM CHLORIDE 50; 1.49; 9 G/1000ML; G/1000ML; G/1000ML
INJECTION, SOLUTION INTRAVENOUS CONTINUOUS
Status: DISCONTINUED | OUTPATIENT
Start: 2024-04-17 | End: 2024-04-17

## 2024-04-17 RX ORDER — CETIRIZINE HYDROCHLORIDE 10 MG/1
10 TABLET ORAL DAILY
Status: DISCONTINUED | OUTPATIENT
Start: 2024-04-17 | End: 2024-04-20

## 2024-04-17 NOTE — CM/SW NOTE
Pt noted to be readmitted for pneumonia and is requiring new supplemental oxygen. He has a history of metastatic esophageal cancer and has a J tube.     Pt last admitted to [facility] 4/8-4/11. He underwent an esophageal stent replacement and J tube placement on 4/9.     Per chart review, pt is current with [facility] for home tube feeds. Updates sent to agency via [email or web address]. Awaiting RD consult at this time to determine if any changes to current regimen. Will need to verify pt has adequate supply of tube feeds prior to discharge home.    Pt also recently set up with [facility] for nursing services, therefore updates sent to agency. TYRESE entered and uploaded in [email or web address].    CM/SW to remain available to assist with discharge planning.    Addendum: RD note and TF order from today sent to [facility] via [email or web address]. Message sent to confirm supply of TF at home.    KAMERON LyonsN, RN-BC    [id number]

## 2024-04-17 NOTE — PLAN OF CARE
Assumed pt care after RN shift report. Pt Aox4. RA/2L NC to maintain spO2 above 90% per order. Scheduled morphine and PRN dilaudid for pain. Ambulating to the bathroom with standby assist. Updated on plan of care.

## 2024-04-17 NOTE — PROGRESS NOTES
[facility]  Pulmonary/Critical Care Consult note    Dontae Buddy Ortegacristina Champion Patient Status:  Inpatient    10/15/1970 MRN [id number]   Location [facility] 1NE-A Attending Eitan Landeros MD   Hosp Day # 1 PCP Adrian Horowitz MD          History of Present Illness:     Reports a vigorous cough and thick secretions unable to bring up.  Denies significant shortness of breath or chest pains.    History:  Past Medical History:    Back problem    Belching    Black stools    Borderline diabetes    Dx in 2013 - HgA1C 6.2%    C. difficile diarrhea    pt treated and without symptoms    Chest pain    Coronary artery disease    On 13: CABG x 4 with LIMA to LAD and SVG to diagonal, OM and PDA    Decorative tattoo    Depression    Difficult intubation    h/o esophagectomy for CA; developed esophagobronchial vistula    Disorder of liver    LIVER CA    Esophageal cancer (HCC)    completed chemo    Esophageal reflux    Essential hypertension    Exposure to medical diagnostic radiation    last tx 2022    Frequent urination    Gastroparesis    Heartburn    High blood pressure    High cholesterol    Found when I had quadruple bypass    History of COVID-19    asymptomatic - pt was dx during a hospitalization for another diagnosis. No continued symptoms    History of stomach ulcers    Hyperlipidemia    Hyperlipidemia LDL goal < 70    Indigestion    Morbid obesity with BMI of 40.0-44.9, adult (HCC)    Muscle weakness    Nausea vomiting and diarrhea    Nontoxic multinodular goiter    Dx in 2013: pt was told that imaging showed thyroid cysts per PCP    Pancreatic cancer (HCC)    last dose 2023 is scheduled for another round 23    Peripheral vascular disease (HCC)    pt denies    Personal history of antineoplastic chemotherapy    for esophageal cancer/completed    Personal history of antineoplastic chemotherapy    pancreatic cancer    Personal history of antineoplastic chemotherapy    Last treatment  3/12/24    Problems with swallowing    Pulmonary nodules    Dx in 8/2013: CT chest showed small bilateral fissural-based lung nodules less than 1 cm    S/P CABG x 4    On 8/16/13: CABG x 4 with LIMA to LAD and SVG to diagonal, OM and PDA    Shortness of breath    when coughing; no oxygen    Vomiting     Past Surgical History:   Procedure Laterality Date    Appendectomy      Appendectomy      Arthroscopy of joint unlisted      right shoulder    Cabg      On 8/16/13: CABG x 4 with LIMA to LAD and SVG to diagonal, OM and PDA    Cardiac cath lab      On 8/14/2013: cardiac cath showed 3-vessel disease    Other surgical history      1.       Laparoscopic robotic-assisted esophagogastrectomy.    Port, indwelling, imp       Family History   Problem Relation Age of Onset    Cancer Mother         breast and colon     Diabetes Neg       reports that he quit smoking about 10 years ago. His smoking use included cigarettes. He started smoking about 37 years ago. He has a 27 pack-year smoking history. He has never used smokeless tobacco. He reports that he does not currently use alcohol. He reports that he does not use drugs.    Allergies:  No Known Allergies    Medications:    Current Facility-Administered Medications:     potassium chloride 20 mEq in dextrose 5%-sodium chloride 0.9% 1000mL infusion premix, , Intravenous, Continuous    pancrelipase (Lip-Prot-Amyl) (Zenpep) DR particles cap 10,000 Units, 10,000 Units, Per G Tube, PRN **AND** sodium bicarbonate tab 325 mg, 325 mg, Per G Tube, PRN    [Held by provider] metoprolol succinate ER (Toprol XL) 24 hr tab 50 mg, 50 mg, Oral, Daily    losartan (Cozaar) tab 50 mg, 50 mg, Oral, Daily    sertraline (Zoloft) tab 150 mg, 150 mg, Oral, Daily    gabapentin (Neurontin) cap 300 mg, 300 mg, Oral, BID    HYDROcodone-acetaminophen (Norco) 5-325 MG per tab 1 tablet, 1 tablet, Oral, Q4H PRN    [Held by provider] metoclopramide (Reglan) tab 10 mg, 10 mg, Oral, TID AC and HS    OLANZapine  (ZyPREXA) tab 5 mg, 5 mg, Oral, Nightly    baclofen (Lioresal) tab 10 mg, 10 mg, Oral, TID PRN    [Held by provider] morphINE ER (MS Contin) tab 15 mg, 15 mg, Oral, Q12H    [Held by provider] pancrelipase (Lip-Prot-Amyl) (Zenpep) DR particles cap 25,000 Units, 25,000 Units, Oral, TID CC    acetaminophen (Tylenol Extra Strength) tab 1,000 mg, 1,000 mg, Oral, Q6H PRN    melatonin tab 3 mg, 3 mg, Oral, Nightly PRN    benzonatate (Tessalon) cap 200 mg, 200 mg, Oral, TID PRN    guaiFENesin (Robitussin) 100 MG/5 ML oral liquid 200 mg, 200 mg, Oral, Q4H PRN    HYDROmorphone (Dilaudid) 1 MG/ML injection 0.2 mg, 0.2 mg, Intravenous, Q2H PRN **OR** HYDROmorphone (Dilaudid) 1 MG/ML injection 0.4 mg, 0.4 mg, Intravenous, Q2H PRN **OR** HYDROmorphone (Dilaudid) 1 MG/ML injection 0.8 mg, 0.8 mg, Intravenous, Q2H PRN    sodium chloride 0.9% infusion, , Intravenous, Continuous    heparin (Porcine) 5000 UNIT/ML injection 5,000 Units, 5,000 Units, Subcutaneous, Q8H INNA    ondansetron (Zofran) 4 MG/2ML injection 4 mg, 4 mg, Intravenous, Q6H PRN    sennosides (Senokot) tab 17.2 mg, 17.2 mg, Oral, Nightly PRN    bisacodyl (Dulcolax) 10 MG rectal suppository 10 mg, 10 mg, Rectal, Daily PRN    piperacillin-tazobactam (Zosyn) 3.375 g in dextrose 5% 100 mL IVPB-ADDV, 3.375 g, Intravenous, Q8H    vancomycin (Firvanq) 50 mg/mL oral solution 125 mg, 125 mg, Oral, Daily    azithromycin (Zithromax) 500 mg in sodium chloride 0.9% 250mL IVPB premix, 500 mg, Intravenous, Q24H    heparin (Porcine) 100 Units/mL lock flush 500 Units, 5 mL, Intravenous, PRN    metoprolol tartrate (Lopressor) tab 25 mg, 25 mg, Per G Tube, 2x Daily(Beta Blocker)    morphINE 10 MG/5ML oral solution 7.5 mg, 7.5 mg, Per G Tube, 4 times per day    lansoprazole (Prevacid Solutab) disintegrating tab 30 mg, 30 mg, Per G Tube, QAM AC    ipratropium-albuterol (Duoneb) 0.5-2.5 (3) MG/3ML inhalation solution 3 mL, 3 mL, Nebulization, Q6H WA    Intake/Output:    Intake/Output  Summary (Last 24 hours) at 4/17/2024 0947  Last data filed at 4/17/2024 0849  Gross per 24 hour   Intake 2419 ml   Output 1000 ml   Net 1419 ml      Body mass index is 19.52 kg/m².    Review of Systems  Review of Systems:   A comprehensive 10 point review of systems was completed.  Pertinent positives and negatives noted in the the HPI.          Patient Vitals for the past 24 hrs:   BP Temp Temp src Pulse Resp SpO2 Height Weight   04/16/24 1107 (!) 151/97 -- -- 63 18 -- -- --   04/16/24 1025 (!) 157/103 -- -- 71 18 92 % -- --   04/16/24 0915 (!) 157/96 -- -- 83 -- 92 % -- --   04/16/24 0913 (!) 170/99 -- -- 95 20 92 % -- --   04/16/24 0911 -- 97.9 °F (36.6 °C) Oral -- -- -- -- --   04/16/24 0845 (!) 161/99 -- -- 90 22 91 % -- --   04/16/24 0830 (!) 142/93 -- -- 75 -- 94 % -- --   04/16/24 0815 (!) 153/127 -- -- 114 -- 95 % -- --   04/16/24 0800 (!) 132/93 -- -- 71 -- 95 % -- --   04/16/24 0745 154/84 -- -- 71 -- 95 % -- --   04/16/24 0600 (!) 149/104 -- -- 74 -- 98 % -- --   04/16/24 0545 (!) 149/98 -- -- 79 -- 98 % -- --   04/16/24 0515 (!) 148/98 -- -- 83 -- 96 % -- --   04/16/24 0512 (!) 143/101 -- -- 84 19 94 % -- --   04/16/24 0330 (!) 137/95 98.1 °F (36.7 °C) Oral 109 18 95 % 6' 2\" (1.88 m) 150 lb (68 kg)     Vitals:    04/16/24 2000 04/17/24 0000 04/17/24 0400 04/17/24 0600   BP: (!) 149/95 158/87 (!) 159/96    BP Location: Left arm Left arm Left arm    Pulse: 55 51 62    Resp: 18 16 18    Temp: 97.8 °F (36.6 °C) 98.1 °F (36.7 °C) 97.4 °F (36.3 °C)    TempSrc: Oral Oral Oral    SpO2: 94% 100% 97%    Weight:    152 lb (68.9 kg)   Height:             Physical Exam  Constitutional:       General: He is not in acute distress.     Appearance: Normal appearance. He is not ill-appearing or diaphoretic.   HENT:      Head: Normocephalic and atraumatic.      Nose: Nose normal. No congestion or rhinorrhea.      Mouth/Throat:      Mouth: Mucous membranes are moist.      Pharynx: Oropharynx is clear. No oropharyngeal  exudate or posterior oropharyngeal erythema.      Comments: Mallampati class II palate  Eyes:      Extraocular Movements: Extraocular movements intact.      Pupils: Pupils are equal, round, and reactive to light.   Cardiovascular:      Rate and Rhythm: Normal rate.      Pulses: Normal pulses.      Heart sounds: Normal heart sounds. No murmur heard.  Pulmonary:      Effort: Pulmonary effort is normal. No respiratory distress.      Breath sounds: Normal breath sounds. No wheezing or rhonchi.      Comments: Diminished breath in the lower lung zones  Chest:      Chest wall: No tenderness.   Abdominal:      General: Abdomen is flat. Bowel sounds are normal.      Palpations: Abdomen is soft.   Musculoskeletal:         General: Normal range of motion.   Skin:     General: Skin is warm.   Neurological:      General: No focal deficit present.      Mental Status: He is alert and oriented to person, place, and time.   Psychiatric:         Mood and Affect: Mood normal.         Behavior: Behavior normal.         Thought Content: Thought content normal.         Judgment: Judgment normal.            Lab Data Review:  Recent Labs   Lab 04/16/24 0420 04/17/24 0444   WBC 12.5* 4.6   HGB 13.3 10.7*   HCT 42.2 34.6*   .0 169.0   EOS 0 3       Recent Labs   Lab 04/16/24 0420 04/17/24 0444   * 139   K 4.4 3.9    105   CO2 28.0 29.0   BUN 14 7*   CREATSERUM 0.60* 0.49*   CA 9.4 8.6   ALB 3.0*  --    ALKPHO 110  --    ALT 25  --    AST 27  --    * 97       No results for input(s): \"MG\" in the last 168 hours.    Lab Results   Component Value Date    PHOS 2.9 03/15/2024        Recent Labs   Lab 04/16/24 0420   INR 1.13   PTT 32.5       No results for input(s): \"ABGPHT\", \"QGPQUT8C\", \"LRARO0O\", \"ABGHCO3\", \"ABGBE\", \"TEMP\", \"TACOS\", \"SITE\", \"DEV\", \"THGB\" in the last 168 hours.    No results for input(s): \"TROP\", \"CKMB\" in the last 168 hours.    Cultures: No results found for this visit on 04/16/24.        Radiology  personally reviewed:  CT CHEST PE AORTA (IV ONLY) (CPT=71260)    Result Date: 4/16/2024  CONCLUSION:  There is no pulmonary embolism to the segmental arterial level.  Numerous patchy airspace opacities are present within the lungs.  These appear slightly progressed from prior exams, particularly within the right upper lobe.  Multifocal pneumonia is favored.  Secretions are present within airways with areas of mucous plugging likely present.  Aspiration is favored.   LOCATION:  [facility]   Dictated by (CST): Yahir To MD on 4/16/2024 at 6:47 AM     Finalized by (CST): Yahir To MD on 4/16/2024 at 6:53 AM         Patient Active Problem List   Diagnosis    Primary hypertension    Hyperlipidemia with target low density lipoprotein (LDL) cholesterol less than 70 mg/dL    Coronary artery disease involving coronary bypass graft of native heart with angina pectoris (HCC)    S/P CABG x 4    Nontoxic multinodular goiter    Pulmonary nodules    Thrombophlebitis leg    BRANDAN (generalized anxiety disorder)    Right shoulder Arthroscopy acromioplasty ,distal  clavicle resection, rotator cuff/labral debridment  Global 05/13/2021    Right bicipital tenosynovitis    Malignant neoplasm of esophagus (HCC)    Pleural effusion    Esophageal anastomotic leak    Esophageal obstruction    On total parenteral nutrition (TPN)    Mechanical complication of esophagostomy (HCC)    Abdominal pain of unknown etiology    Migration of esophageal stent    Gastroparesis    Esophageal carcinoma (HCC)    Normocytic anemia    Esophageal fistula    Subacute cough    Acquired bronchoesophageal fistula (HCC)    Pneumonia of both lower lobes due to infectious organism    Malignant neoplasm of pancreas (HCC)    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 difficile colitis    Dysphagia, unspecified type    Dyspnea and respiratory abnormalities    Hypokalemia    Dysphagia    Narcotic drug use    History of esophageal cancer    Palliative care by specialist    Neoplasm related pain    Endobronchial mass    Hyponatremia    Thrombocytopenia (HCC)    Acute kidney injury (HCC)    Hyperglycemia    Aspiration pneumonitis (HCC)    C. difficile diarrhea    Aspiration pneumonia (HCC)    Malignant neoplasm metastatic to liver (HCC)    Hyperlipidemia    Nausea vomiting and diarrhea    Fistula, bronchoesophageal (HCC)    Iron deficiency anemia, unspecified iron deficiency anemia type    Azotemia    Palliative care encounter    Goals of care, counseling/discussion    Cancer related pain    Anemia    Fistula    Acute cough    Pneumonia of right lung due to infectious organism, unspecified part of lung       Assessment:  Acute hypoxemic respiratory failure: Requiring supplemental oxygen 2 L/min by nasal cannula  Acute bronchitis with difficulty coughing up secretions  Patchy bilateral airspace opacities suspect due to multifocal aspiration pneumonia strep pneumonia and Legionella antigen negative, COVID-19, influenza a and B and RSV PCR negative  Shortness of breath  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  Metastatic esophageal Adenocarcinoma  status post esophagectomy  Status post J-tube placement September 2022  History of pancreatic cancer   Coronary artery disease status post CABG  Hypertension  Depression  Anxiety disorder  Chronic anemia  Hyperlipidemia      Plan:  Wean FiO2 to keep oxygen saturations between 90% to 92%  Continue current antibiotics  DuoNebs every 6 hours  Flutter device to help cough out secretions  Add Mucomyst nebs every 6 hours for 2 days  DVT prophylaxis:  heparin 5000 units subcutaneous every 12 hours   GI prophylaxis:-  Will follow  further recommendations    Thank You for allowing me to participate in this patient's care     Dusty Brooks MD

## 2024-04-17 NOTE — PROGRESS NOTES
[facility]   part of [facility]     Hospitalist Progress Note     Dontae Cortez . Patient Status:  Inpatient    10/15/1970 MRN [id number]   Location [facility] 1NE-A Attending Georgette Mahoney MD   Hosp Day # 1 PCP Adrian Horowitz MD     Chief Complaint: cough     Subjective:     Patient with chest wall pain after \"cough spells\". Thick secretions.     Objective:    Review of Systems:   A comprehensive review of systems was completed; pertinent positive and negatives stated in subjective.    Vital signs:  Temp:  [97.4 °F (36.3 °C)-98.1 °F (36.7 °C)] 97.4 °F (36.3 °C)  Pulse:  [51-95] 62  Resp:  [16-22] 18  BP: (149-170)/() 159/96  SpO2:  [91 %-100 %] 97 %    Physical Exam:    General: No acute distress  Respiratory: No wheezes, no rhonchi  Cardiovascular: S1, S2, regular rate and rhythm  Abdomen: Soft, Non-tender, non-distended, positive bowel sounds  Neuro: No new focal deficits.   Extremities: No edema      Diagnostic Data:    Labs:  Recent Labs   Lab 24  0444   WBC 12.5* 4.6   HGB 13.3 10.7*   MCV 91.1 92.5   .0 169.0   BAND 9 3   INR 1.13  --        Recent Labs   Lab 24  0444   * 97   BUN 14 7*   CREATSERUM 0.60* 0.49*   CA 9.4 8.6   ALB 3.0*  --    * 139   K 4.4 3.9    105   CO2 28.0 29.0   ALKPHO 110  --    AST 27  --    ALT 25  --    BILT 0.3  --    TP 7.9  --        Estimated Creatinine Clearance: 168 mL/min (A) (based on SCr of 0.49 mg/dL (L)).    Recent Labs   Lab 24  042   TROPHS 9       Recent Labs   Lab 24   PTP 14.6   INR 1.13                  Microbiology    No results found for this visit on 24.      Imaging: Reviewed in Epic.    Medications:    [Held by provider] metoprolol succinate ER  50 mg Oral Daily    losartan  50 mg Oral Daily    sertraline  150 mg Oral Daily    gabapentin  300 mg Oral BID    [Held by provider] metoclopramide  10 mg Oral TID AC and HS    OLANZapine  5 mg Oral  Nightly    [Held by provider] morphINE ER  15 mg Oral Q12H    [Held by provider] lipase-protease-amylase (Lip-Prot-Amyl)  25,000 Units Oral TID CC    heparin  5,000 Units Subcutaneous Q8H INNA    piperacillin-tazobactam  3.375 g Intravenous Q8H    vancomycin  125 mg Oral Daily    azithromycin  500 mg Intravenous Q24H    metoprolol tartrate  25 mg Per G Tube 2x Daily(Beta Blocker)    morphINE  7.5 mg Per G Tube 4 times per day    lansoprazole  30 mg Per G Tube QAM AC    ipratropium-albuterol  3 mL Nebulization Q6H WA       Assessment & Plan:      #SOB 2/2 aspiration PNA   # Esoph/trach fisulta s/p stent  -Pulm and GI rec appreciated   - abx ongoing   - neb treatment   - antitussive      #HTN  #Recurrent bronchial-esophageal fistula with stent per Dr. Ritchie   #Metastatic esophageal cancer  - hx of esophagectomy, gastric pull through, esophago-bronchial fistula with multiple stents  - PTA pancreatic enzymes   - PTA pain medications     #CAD w/ hx of CABG  #HTN- losartan, metoprolol  #Anxiety/depression-  olanzapine, sertraline  #Chronic anemia         Georgette Mahoney MD    Supplementary Documentation:     Quality:  DVT Mechanical Prophylaxis:     Early ambuation  DVT Pharmacologic Prophylaxis   Medication    heparin (Porcine) 5000 UNIT/ML injection 5,000 Units    heparin (Porcine) 100 Units/mL lock flush 500 Units                Code Status: Full Code  Neumann: No urinary catheter in place  Neumann Duration (in days):   Central line:    SHUBHAM:     Discharge is dependent on: course  At this point Mr. Cortez is expected to be discharge to: home     The 21st Century Cures Act makes medical notes like these available to patients in the interest of transparency. Please be advised this is a medical document. Medical documents are intended to carry relevant information, facts as evident, and the clinical opinion of the practitioner. The medical note is intended as peer to peer communication and may appear blunt or direct. It is  written in medical language and may contain abbreviations or verbiage that are unfamiliar.

## 2024-04-17 NOTE — DIETARY MALNUTRITION NOTE
[facility]   part of [facility]    NUTRITION ASSESSMENT    Pt meets moderate malnutrition criteria at this time.    CRITERIA FOR MALNUTRITION DIAGNOSIS:  Criteria for severe malnutrition diagnosis: acute illness/injury related to wt loss greater than 7.5% in 3 months, body fat moderate depletion, and muscle mass moderate depletion      NUTRITION INTERVENTION:    Enteral Nutrition - Via Jtube  Recommend starting [employer] 1.4 at 65 mL/hr   At 1500 begin to cycle feeds.  Increase 10ml q4 as tolerated to goal 140ml/hr x 12h.   This will provide 2352 kcal, 103 grams protein, 1192 mL total free water, and 100% of RDI's.   Recommend 200 mL water flush q 4 hours, TF+FWF provides 2392 mL total fluids.       PATIENT STATUS: 04/16/24 Consult for TF  53/M admitted with Pneumonia of Right Lung.  Pt known to nutrition services from previous admissions. Had recent Jtube placed.  Prior to that was eating and had been on TPN for just a few days. Pt is no longer on TPN. Met with pt at bedside, no family in room.  Pt reports he was on KF 1.4 and was tolerating 65ml/hr x 20h at home PTA.  He endorses no issues with n/v/d.  He feels he has continued to lose wt, and per EMR he has lost 10% of his body wt x 1 mo.  He is asking to cycle his feeds. Discussed process of cycling feeds.  Pt is awaiting esophageal surgery later this month, and is strict NPO at this time per GI.  Pt with moderate muscle and fat depletion . NKFA.  All questions answered at time of visit.  TF recs as above. Recommend reweigh via standing scale as able to confirm wt changes and monitor for wt gain.    ANTHROPOMETRICS:  Ht: 188 cm (6' 2\")  Wt: 68.9 kg (152 lb).   BMI: Body mass index is 19.52 kg/m².  IBW: 86.4 kg      WEIGHT HISTORY:   Weight loss: Yes, Severe Wt loss of 18 lbs, 10%, over 1 months     Wt Readings from Last 10 Encounters:   04/17/24 68.9 kg (152 lb)   04/08/24 68.4 kg (150 lb 14.4 oz)   03/25/24 78.7 kg (173 lb 8 oz)   03/20/24 77.1 kg  (170 lb)   03/14/24 77.1 kg (170 lb)   03/12/24 82.6 kg (182 lb)   03/07/24 82.4 kg (181 lb 10.5 oz)   02/22/24 79.6 kg (175 lb 8 oz)   02/20/24 80.3 kg (177 lb)   02/19/24 80.5 kg (177 lb 8 oz)        NUTRITION:  Diet:       Procedures    NPO      Food Allergies: No  Cultural/Ethnic/Buddhist Preferences Addressed: Yes    Percent Meals Eaten (last 3 days)       None            GI system review: WNL Last BM: 4/16  Skin and wounds: none    NUTRITION RELATED PHYSICAL FINDINGS:     1. Body Fat/Muscle Mass: moderate depletion body fat Orbital fat pad and Buccal fat pad and moderate muscle depletion Temple region and Clavicle region     2. Fluid Accumulation: none     NUTRITION PRESCRIPTION: 68.9 kg  Calories: 0639-7149 calories/day (30-35 kcal/kg)  Protein:  grams protein/day (1.2-1.5 grams protein per kg)  Fluid: ~1 ml/kcal or per MD discretion    NUTRITION DIAGNOSIS/PROBLEM:  Malnutrition related to physiological causes and increased nutritional demands for healing as evidenced by documented/reported unintentional weight loss, loss of fat mass, and loss of muscle mass      MONITOR AND EVALUATE/NUTRITION GOALS:  Weight stable within 1 to 2 lbs during admission - New  Tolerate alternative nutrition at 100% of goal - New      MEDICATIONS:  Abx, Prevacid,    LABS:  Reviewed    Pt is at High nutrition risk    Fauzia Bonds RD, LDN, CNSC  Clinical Dietitian  Phone [phone]

## 2024-04-17 NOTE — PLAN OF CARE
AAO X 4, on RA, VSS  IV fluids and abx infusing per orders  Dietary consult ordered for Tube feedings  PEG tube is intact, patent, and used for meds  Blood, urine, and sputum cultures all pending  Comfort and safety rounds done. Call light in reach  Intermittent cough with moderate sputum production  Generalized pain managed with Dilaudid and morphine  Patient and spouse updated on plan of care

## 2024-04-17 NOTE — PROGRESS NOTES
Assumed care of patient at 0700. Continues on iv abx for pna suspected aspiration PNA. Strict NPO. Restarted on tube feeds on shift. High levels of pain especially with coughing. Prn pain meds on shift q2. Continues with nebs x2. Continues with strong productive cough. Blood cutlures posiive on shift. MD notified and repeat culture ordered. Updated on plan of care and condition update. All needs met on shift.

## 2024-04-18 PROBLEM — R78.81 BACTEREMIA: Status: ACTIVE | Noted: 2024-01-01

## 2024-04-18 PROBLEM — R78.81 BACTEREMIA: Status: ACTIVE | Noted: 2024-04-18

## 2024-04-18 LAB
GLUCOSE BLD-MCNC: 123 MG/DL (ref 70–99)
GLUCOSE BLD-MCNC: 80 MG/DL (ref 70–99)
GLUCOSE BLD-MCNC: 80 MG/DL (ref 70–99)
GLUCOSE BLD-MCNC: 94 MG/DL (ref 70–99)

## 2024-04-18 PROCEDURE — 99233 SBSQ HOSP IP/OBS HIGH 50: CPT | Performed by: INTERNAL MEDICINE

## 2024-04-18 PROCEDURE — 99232 SBSQ HOSP IP/OBS MODERATE 35: CPT | Performed by: INTERNAL MEDICINE

## 2024-04-18 RX ORDER — MORPHINE SULFATE 10 MG/5ML
10 SOLUTION ORAL EVERY 6 HOURS SCHEDULED
Status: DISCONTINUED | OUTPATIENT
Start: 2024-04-18 | End: 2024-04-20

## 2024-04-18 NOTE — PROGRESS NOTES
[facility]  Pulmonary/Critical Care Consult note    Dontae Buddy Ortegacristina Champion Patient Status:  Inpatient    10/15/1970 MRN [id number]   Location [facility] 1NE-A Attending Eitan Landeros MD   Hosp Day # 2 PCP Adrian Horowitz MD          History of Present Illness:     Breathing much better now with occasional cough and mobilizing secretions    History:  Past Medical History:    Back problem    Belching    Black stools    Borderline diabetes    Dx in 2013 - HgA1C 6.2%    C. difficile diarrhea    pt treated and without symptoms    Chest pain    Coronary artery disease    On 13: CABG x 4 with LIMA to LAD and SVG to diagonal, OM and PDA    Decorative tattoo    Depression    Difficult intubation    h/o esophagectomy for CA; developed esophagobronchial vistula    Disorder of liver    LIVER CA    Esophageal cancer (HCC)    completed chemo    Esophageal reflux    Essential hypertension    Exposure to medical diagnostic radiation    last tx 2022    Frequent urination    Gastroparesis    Heartburn    High blood pressure    High cholesterol    Found when I had quadruple bypass    History of COVID-19    asymptomatic - pt was dx during a hospitalization for another diagnosis. No continued symptoms    History of stomach ulcers    Hyperlipidemia    Hyperlipidemia LDL goal < 70    Indigestion    Morbid obesity with BMI of 40.0-44.9, adult (HCC)    Muscle weakness    Nausea vomiting and diarrhea    Nontoxic multinodular goiter    Dx in 2013: pt was told that imaging showed thyroid cysts per PCP    Pancreatic cancer (HCC)    last dose 2023 is scheduled for another round 23    Peripheral vascular disease (HCC)    pt denies    Personal history of antineoplastic chemotherapy    for esophageal cancer/completed    Personal history of antineoplastic chemotherapy    pancreatic cancer    Personal history of antineoplastic chemotherapy    Last treatment 3/12/24    Problems with swallowing    Pulmonary  nodules    Dx in 8/2013: CT chest showed small bilateral fissural-based lung nodules less than 1 cm    S/P CABG x 4    On 8/16/13: CABG x 4 with LIMA to LAD and SVG to diagonal, OM and PDA    Shortness of breath    when coughing; no oxygen    Vomiting     Past Surgical History:   Procedure Laterality Date    Appendectomy      Appendectomy      Arthroscopy of joint unlisted      right shoulder    Cabg      On 8/16/13: CABG x 4 with LIMA to LAD and SVG to diagonal, OM and PDA    Cardiac cath lab      On 8/14/2013: cardiac cath showed 3-vessel disease    Other surgical history      1.       Laparoscopic robotic-assisted esophagogastrectomy.    Port, indwelling, imp       Family History   Problem Relation Age of Onset    Cancer Mother         breast and colon     Diabetes Neg       reports that he quit smoking about 10 years ago. His smoking use included cigarettes. He started smoking about 37 years ago. He has a 27 pack-year smoking history. He has never used smokeless tobacco. He reports that he does not currently use alcohol. He reports that he does not use drugs.    Allergies:  No Known Allergies    Medications:    Current Facility-Administered Medications:     pancrelipase (Lip-Prot-Amyl) (Zenpep) DR particles cap 10,000 Units, 10,000 Units, Per G Tube, PRN **AND** sodium bicarbonate tab 325 mg, 325 mg, Per G Tube, PRN    metoprolol tartrate (Lopressor) tab 25 mg, 25 mg, Oral, 2x Daily(Beta Blocker)    acetylcysteine (Mucomyst) 20 % nebulizer solution 3 mL, 3 mL, Nebulization, Q6H WA    cetirizine (ZyrTEC) tab 10 mg, 10 mg, Per J Tube, Daily    losartan (Cozaar) tab 50 mg, 50 mg, Oral, Daily    sertraline (Zoloft) tab 150 mg, 150 mg, Oral, Daily    gabapentin (Neurontin) cap 300 mg, 300 mg, Oral, BID    HYDROcodone-acetaminophen (Norco) 5-325 MG per tab 1 tablet, 1 tablet, Oral, Q4H PRN    metoclopramide (Reglan) tab 10 mg, 10 mg, Oral, TID AC and HS    OLANZapine (ZyPREXA) tab 5 mg, 5 mg, Oral, Nightly    baclofen  (Lioresal) tab 10 mg, 10 mg, Oral, TID PRN    pancrelipase (Lip-Prot-Amyl) (Zenpep) DR particles cap 25,000 Units, 25,000 Units, Oral, TID CC    acetaminophen (Tylenol Extra Strength) tab 1,000 mg, 1,000 mg, Oral, Q6H PRN    melatonin tab 3 mg, 3 mg, Oral, Nightly PRN    benzonatate (Tessalon) cap 200 mg, 200 mg, Oral, TID PRN    guaiFENesin (Robitussin) 100 MG/5 ML oral liquid 200 mg, 200 mg, Oral, Q4H PRN    HYDROmorphone (Dilaudid) 1 MG/ML injection 0.2 mg, 0.2 mg, Intravenous, Q2H PRN **OR** HYDROmorphone (Dilaudid) 1 MG/ML injection 0.4 mg, 0.4 mg, Intravenous, Q2H PRN **OR** HYDROmorphone (Dilaudid) 1 MG/ML injection 0.8 mg, 0.8 mg, Intravenous, Q2H PRN    heparin (Porcine) 5000 UNIT/ML injection 5,000 Units, 5,000 Units, Subcutaneous, Q8H INNA    ondansetron (Zofran) 4 MG/2ML injection 4 mg, 4 mg, Intravenous, Q6H PRN    sennosides (Senokot) tab 17.2 mg, 17.2 mg, Oral, Nightly PRN    bisacodyl (Dulcolax) 10 MG rectal suppository 10 mg, 10 mg, Rectal, Daily PRN    piperacillin-tazobactam (Zosyn) 3.375 g in dextrose 5% 100 mL IVPB-ADDV, 3.375 g, Intravenous, Q8H    vancomycin (Firvanq) 50 mg/mL oral solution 125 mg, 125 mg, Oral, Daily    heparin (Porcine) 100 Units/mL lock flush 500 Units, 5 mL, Intravenous, PRN    morphINE 10 MG/5ML oral solution 7.5 mg, 7.5 mg, Per G Tube, 4 times per day    lansoprazole (Prevacid Solutab) disintegrating tab 30 mg, 30 mg, Per G Tube, QAM AC    ipratropium-albuterol (Duoneb) 0.5-2.5 (3) MG/3ML inhalation solution 3 mL, 3 mL, Nebulization, Q6H WA    Intake/Output:    Intake/Output Summary (Last 24 hours) at 4/18/2024 0941  Last data filed at 4/18/2024 0000  Gross per 24 hour   Intake 400 ml   Output --   Net 400 ml      Body mass index is 19.52 kg/m².    Review of Systems  Review of Systems:   A comprehensive 10 point review of systems was completed.  Pertinent positives and negatives noted in the the HPI.          Patient Vitals for the past 24 hrs:   BP Temp Temp src Pulse  Resp SpO2 Height Weight   04/16/24 1107 (!) 151/97 -- -- 63 18 -- -- --   04/16/24 1025 (!) 157/103 -- -- 71 18 92 % -- --   04/16/24 0915 (!) 157/96 -- -- 83 -- 92 % -- --   04/16/24 0913 (!) 170/99 -- -- 95 20 92 % -- --   04/16/24 0911 -- 97.9 °F (36.6 °C) Oral -- -- -- -- --   04/16/24 0845 (!) 161/99 -- -- 90 22 91 % -- --   04/16/24 0830 (!) 142/93 -- -- 75 -- 94 % -- --   04/16/24 0815 (!) 153/127 -- -- 114 -- 95 % -- --   04/16/24 0800 (!) 132/93 -- -- 71 -- 95 % -- --   04/16/24 0745 154/84 -- -- 71 -- 95 % -- --   04/16/24 0600 (!) 149/104 -- -- 74 -- 98 % -- --   04/16/24 0545 (!) 149/98 -- -- 79 -- 98 % -- --   04/16/24 0515 (!) 148/98 -- -- 83 -- 96 % -- --   04/16/24 0512 (!) 143/101 -- -- 84 19 94 % -- --   04/16/24 0330 (!) 137/95 98.1 °F (36.7 °C) Oral 109 18 95 % 6' 2\" (1.88 m) 150 lb (68 kg)     Vitals:    04/17/24 1137 04/17/24 2025 04/18/24 0534 04/18/24 0725   BP: (!) 138/91 128/74 143/89    BP Location: Left arm Left arm Left arm    Pulse: 62 65 84 65   Resp: 16 18 16 16   Temp: 98 °F (36.7 °C) 98 °F (36.7 °C) 97.8 °F (36.6 °C)    TempSrc: Oral Oral Oral    SpO2: 93% 96% 97% 95%   Weight:       Height:             Physical Exam  Constitutional:       General: He is not in acute distress.     Appearance: Normal appearance. He is not ill-appearing or diaphoretic.   HENT:      Head: Normocephalic and atraumatic.      Nose: Nose normal. No congestion or rhinorrhea.      Mouth/Throat:      Mouth: Mucous membranes are moist.      Pharynx: Oropharynx is clear. No oropharyngeal exudate or posterior oropharyngeal erythema.      Comments: Mallampati class II palate  Eyes:      Extraocular Movements: Extraocular movements intact.      Pupils: Pupils are equal, round, and reactive to light.   Cardiovascular:      Rate and Rhythm: Normal rate.      Pulses: Normal pulses.      Heart sounds: Normal heart sounds. No murmur heard.  Pulmonary:      Effort: Pulmonary effort is normal. No respiratory distress.       Breath sounds: Normal breath sounds. No wheezing or rhonchi.      Comments: Diminished breath in the lower lung zones  Chest:      Chest wall: No tenderness.   Abdominal:      General: Abdomen is flat. Bowel sounds are normal.      Palpations: Abdomen is soft.   Musculoskeletal:         General: Normal range of motion.   Skin:     General: Skin is warm.   Neurological:      General: No focal deficit present.      Mental Status: He is alert and oriented to person, place, and time.   Psychiatric:         Mood and Affect: Mood normal.         Behavior: Behavior normal.         Thought Content: Thought content normal.         Judgment: Judgment normal.            Lab Data Review:  Recent Labs   Lab 04/16/24 0420 04/17/24 0444   WBC 12.5* 4.6   HGB 13.3 10.7*   HCT 42.2 34.6*   .0 169.0   EOS 0 3       Recent Labs   Lab 04/16/24 0420 04/17/24 0444   * 139   K 4.4 3.9    105   CO2 28.0 29.0   BUN 14 7*   CREATSERUM 0.60* 0.49*   CA 9.4 8.6   ALB 3.0*  --    ALKPHO 110  --    ALT 25  --    AST 27  --    * 97       No results for input(s): \"MG\" in the last 168 hours.    Lab Results   Component Value Date    PHOS 2.9 03/15/2024        Recent Labs   Lab 04/16/24 0420   INR 1.13   PTT 32.5       No results for input(s): \"ABGPHT\", \"KBLLTY6L\", \"FKPKM2X\", \"ABGHCO3\", \"ABGBE\", \"TEMP\", \"TACOS\", \"SITE\", \"DEV\", \"THGB\" in the last 168 hours.    No results for input(s): \"TROP\", \"CKMB\" in the last 168 hours.    Cultures:   Hospital Encounter on 04/16/24   1. Urine Culture, Routine     Status: None    Collection Time: 04/16/24  7:59 AM    Specimen: Urine, clean catch   Result Value Ref Range    Urine Culture No Growth at 18-24 hrs. N/A   2. Blood Culture     Status: Abnormal (Preliminary result)    Collection Time: 04/16/24  5:00 AM    Specimen: Blood,peripheral   Result Value Ref Range    Blood Smear Positive Blood Culture (A) N/A    Blood Smear Gram Positive Rods (A) N/A           Radiology personally  reviewed:  No results found.     Patient Active Problem List   Diagnosis    Primary hypertension    Hyperlipidemia with target low density lipoprotein (LDL) cholesterol less than 70 mg/dL    Coronary artery disease involving coronary bypass graft of native heart with angina pectoris (HCC)    S/P CABG x 4    Nontoxic multinodular goiter    Pulmonary nodules    Thrombophlebitis leg    BRANDAN (generalized anxiety disorder)    Right shoulder Arthroscopy acromioplasty ,distal  clavicle resection, rotator cuff/labral debridment  Global 05/13/2021    Right bicipital tenosynovitis    Malignant neoplasm of esophagus (HCC)    Pleural effusion    Esophageal anastomotic leak    Esophageal obstruction    On total parenteral nutrition (TPN)    Mechanical complication of esophagostomy (HCC)    Abdominal pain of unknown etiology    Migration of esophageal stent    Gastroparesis    Esophageal carcinoma (HCC)    Normocytic anemia    Esophageal fistula    Subacute cough    Acquired bronchoesophageal fistula (HCC)    Pneumonia of both lower lobes due to infectious organism    Malignant neoplasm of pancreas (HCC)    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 difficile colitis    Dysphagia, unspecified type    Dyspnea and respiratory abnormalities    Hypokalemia    Dysphagia    Narcotic drug use    History of esophageal cancer    Palliative care by specialist    Neoplasm related pain    Endobronchial mass    Hyponatremia    Thrombocytopenia (HCC)    Acute kidney injury (HCC)    Hyperglycemia    Aspiration pneumonitis (HCC)    C. difficile diarrhea    Aspiration pneumonia (HCC)    Malignant neoplasm metastatic to liver (HCC)     Hyperlipidemia    Nausea vomiting and diarrhea    Fistula, bronchoesophageal (HCC)    Iron deficiency anemia, unspecified iron deficiency anemia type    Azotemia    Palliative care encounter    Goals of care, counseling/discussion    Cancer related pain    Anemia    Fistula    Acute cough    Pneumonia of right lung due to infectious organism, unspecified part of lung       Assessment:  Acute hypoxemic respiratory failure: Weaned to room air  Acute bronchitis with difficulty coughing up secretions: Markedly improved  Patchy bilateral airspace opacities suspect due to multifocal aspiration pneumonia strep pneumonia and Legionella antigen negative, COVID-19, influenza a and B and RSV PCR negative: Blood cultures positive for gram-positive rods 2/2 bottle  Shortness of breath  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  Metastatic esophageal Adenocarcinoma  status post esophagectomy  Status post J-tube placement September 2022  History of pancreatic cancer   Coronary artery disease status post CABG  Hypertension  Depression  Anxiety disorder  Chronic anemia  Hyperlipidemia      Plan:    Continue current antibiotics for 7-day course  DuoNebs every 6 hours  Flutter device to help cough out secretions  Continue Mucomyst nebs every 6 hours for 2 days total  DVT prophylaxis:  heparin 5000 units subcutaneous every 12 hours   GI prophylaxis:-Prevacid SoluTab's 30 mg per G-tube  Will follow further recommendations    Thank You for allowing me to participate in this patient's care     Dusty Brooks MD

## 2024-04-18 NOTE — CM/SW NOTE
CM met with pt at bedside. He confirms being current with [facility] and [facility] for tube feeds. He would like to continue services with both. Pt reports that he has 5 boxes of [employer] tube feeds, therefore he shouldn't need any for awhile. He reports that he was supposed to obtain saline and a piece of equipment for his feeding pump - offered to reach out to [facility] to determine their whereabouts. Pt appreciative.    He denies any additional discharge needs at this time.    CM/SW to remain available to assist with discharge planning.    Irish Blackman, KAMERONN, RN-BC    [id number]

## 2024-04-18 NOTE — PROGRESS NOTES
[facility]   part of [facility]     Hospitalist Progress Note     Dontae Cortez . Patient Status:  Inpatient    10/15/1970 MRN [id number]   Location [facility] 1NE-A Attending Georgette Mahoney MD   Hosp Day # 2 PCP Adrian Horowitz MD     Chief Complaint: cough better     Subjective:     Patient with chest wall pain and still requiring dilaudid IV.   Mucomyst helping     Objective:    Review of Systems:   A comprehensive review of systems was completed; pertinent positive and negatives stated in subjective.    Vital signs:  Temp:  [97.8 °F (36.6 °C)-98 °F (36.7 °C)] 97.8 °F (36.6 °C)  Pulse:  [62-84] 65  Resp:  [16-18] 16  BP: (128-143)/(74-91) 143/89  SpO2:  [93 %-97 %] 95 %    Physical Exam:    General: No acute distress  Respiratory: No wheezes, no rhonchi  Cardiovascular: S1, S2, regular rate and rhythm  Abdomen: Soft, Non-tender, non-distended, positive bowel sounds  Neuro: No new focal deficits.   Extremities: No edema      Diagnostic Data:    Labs:  Recent Labs   Lab 240 24  0444   WBC 12.5* 4.6   HGB 13.3 10.7*   MCV 91.1 92.5   .0 169.0   BAND 9 3   INR 1.13  --        Recent Labs   Lab 24  0444   * 97   BUN 14 7*   CREATSERUM 0.60* 0.49*   CA 9.4 8.6   ALB 3.0*  --    * 139   K 4.4 3.9    105   CO2 28.0 29.0   ALKPHO 110  --    AST 27  --    ALT 25  --    BILT 0.3  --    TP 7.9  --        Estimated Creatinine Clearance: 168 mL/min (A) (based on SCr of 0.49 mg/dL (L)).    Recent Labs   Lab 24  0420   TROPHS 9       Recent Labs   Lab 24   PTP 14.6   INR 1.13                  Microbiology    Hospital Encounter on 24   1. Urine Culture, Routine     Status: None    Collection Time: 24  7:59 AM    Specimen: Urine, clean catch   Result Value Ref Range    Urine Culture No Growth at 18-24 hrs. N/A   2. Blood Culture     Status: Abnormal (Preliminary result)    Collection Time: 24  5:00 AM     I advised patient that this sounds most consistent with hypoglycemia. We discussed dietary modification including avoidance of concentrated sweets and simple carbohydrates. Would recommend small frequent meals including protein and complex carbohydrates. Specimen: Blood,peripheral   Result Value Ref Range    Blood Smear Positive Blood Culture (A) N/A    Blood Smear Gram Positive Rods (A) N/A         Imaging: Reviewed in Epic.    Medications:    metoprolol tartrate  25 mg Oral 2x Daily(Beta Blocker)    acetylcysteine  3 mL Nebulization Q6H WA    cetirizine  10 mg Per J Tube Daily    losartan  50 mg Oral Daily    sertraline  150 mg Oral Daily    gabapentin  300 mg Oral BID    metoclopramide  10 mg Oral TID AC and HS    OLANZapine  5 mg Oral Nightly    lipase-protease-amylase (Lip-Prot-Amyl)  25,000 Units Oral TID CC    heparin  5,000 Units Subcutaneous Q8H INNA    piperacillin-tazobactam  3.375 g Intravenous Q8H    vancomycin  125 mg Oral Daily    azithromycin  500 mg Intravenous Q24H    morphINE  7.5 mg Per G Tube 4 times per day    lansoprazole  30 mg Per G Tube QAM AC    ipratropium-albuterol  3 mL Nebulization Q6H WA       Assessment & Plan:      #SOB 2/2 aspiration PNA   # Esoph/trach fisulta s/p stent  -Pulm and GI rec appreciated   - abx ongoing   - neb treatment   - antitussive   - mucomyst     #Bacteremia   - repeat blood cx pending   - abx ongoing      #HTN  #Recurrent bronchial-esophageal fistula with stent per Dr. Ritchie   #Metastatic esophageal cancer  - hx of esophagectomy, gastric pull through, esophago-bronchial fistula with multiple stents  - PTA pancreatic enzymes   - PTA pain medications-> will increase dose to avoid using dilaudid IV     #CAD w/ hx of CABG  #HTN- losartan, metoprolol  #Anxiety/depression-  olanzapine, sertraline  #Chronic anemia         Georgette Mahoney MD    Supplementary Documentation:     Quality:  DVT Mechanical Prophylaxis:     Early ambuation  DVT Pharmacologic Prophylaxis   Medication    heparin (Porcine) 5000 UNIT/ML injection 5,000 Units    heparin (Porcine) 100 Units/mL lock flush 500 Units                Code Status: Full Code  Neumann: No urinary catheter in place  Neumann Duration (in days):   Central line:    SHUBHAM:      Discharge is dependent on: course  At this point Mr. Cortez is expected to be discharge to: home     The 21st Century Cures Act makes medical notes like these available to patients in the interest of transparency. Please be advised this is a medical document. Medical documents are intended to carry relevant information, facts as evident, and the clinical opinion of the practitioner. The medical note is intended as peer to peer communication and may appear blunt or direct. It is written in medical language and may contain abbreviations or verbiage that are unfamiliar.

## 2024-04-18 NOTE — PLAN OF CARE
Pt vitals stable, A&O x4. Pt denied chest pain, stated some shortness of breath while coughing. Tolerating tube feeds. To remain NPO. Pt stated having pain, PRN meds given. Bed locked in low position, call light in reach, rounding provided.

## 2024-04-18 NOTE — PAYOR COMM NOTE
--------------  4/18:  CONTINUED STAY REVIEW    Payor: [employer]  Subscriber #:  [id number]  Authorization Number: [id number]    Admit date: 4/16/24  Admit time: 10:13 AM    HOSPITALIST:    Chief Complaint: cough better         Subjective:  Patient with chest wall pain and still requiring dilaudid IV.   Mucomyst helping            Objective:  Review of Systems:   A comprehensive review of systems was completed; pertinent positive and negatives stated in subjective.     Vital signs:  Temp:  [97.8 °F (36.6 °C)-98 °F (36.7 °C)] 97.8 °F (36.6 °C)  Pulse:  [62-84] 65  Resp:  [16-18] 16  BP: (128-143)/(74-91) 143/89  SpO2:  [93 %-97 %] 95 %     Physical Exam:    General: No acute distress  Respiratory: No wheezes, no rhonchi  Cardiovascular: S1, S2, regular rate and rhythm  Abdomen: Soft, Non-tender, non-distended, positive bowel sounds  Neuro: No new focal deficits.   Extremities: No edema        Diagnostic Data:    Labs:       Recent Labs   Lab 04/16/24  0420 04/17/24  0444   WBC 12.5* 4.6   HGB 13.3 10.7*   MCV 91.1 92.5   .0 169.0   BAND 9 3   INR 1.13  --               Recent Labs   Lab 04/16/24  0420 04/17/24  0444   * 97   BUN 14 7*   CREATSERUM 0.60* 0.49*   CA 9.4 8.6   ALB 3.0*  --    * 139   K 4.4 3.9    105   CO2 28.0 29.0   ALKPHO 110  --    AST 27  --    ALT 25  --    BILT 0.3  --    TP 7.9  --          Estimated Creatinine Clearance: 168 mL/min (A) (based on SCr of 0.49 mg/dL (L)).         Recent Labs   Lab 04/16/24  0420   TROPHS 9             Recent Labs   Lab 04/16/24  0420   PTP 14.6   INR 1.13                     Microbiology           Hospital Encounter on 04/16/24   1. Urine Culture, Routine     Status: None     Collection Time: 04/16/24  7:59 AM     Specimen: Urine, clean catch   Result Value Ref Range     Urine Culture No Growth at 18-24 hrs. N/A   2. Blood Culture     Status: Abnormal (Preliminary result)     Collection Time: 04/16/24  5:00 AM      Specimen: Blood,peripheral   Result Value Ref Range     Blood Smear Positive Blood Culture (A) N/A     Blood Smear Gram Positive Rods (A) N/A            Imaging: Reviewed in Epic.     Medications:   Scheduled Medications    metoprolol tartrate  25 mg Oral 2x Daily(Beta Blocker)    acetylcysteine  3 mL Nebulization Q6H WA    cetirizine  10 mg Per J Tube Daily    losartan  50 mg Oral Daily    sertraline  150 mg Oral Daily    gabapentin  300 mg Oral BID    metoclopramide  10 mg Oral TID AC and HS    OLANZapine  5 mg Oral Nightly    lipase-protease-amylase (Lip-Prot-Amyl)  25,000 Units Oral TID CC    heparin  5,000 Units Subcutaneous Q8H INNA    piperacillin-tazobactam  3.375 g Intravenous Q8H    vancomycin  125 mg Oral Daily    azithromycin  500 mg Intravenous Q24H    morphINE  7.5 mg Per G Tube 4 times per day    lansoprazole  30 mg Per G Tube QAM AC    ipratropium-albuterol  3 mL Nebulization Q6H WA                  Assessment & Plan:  #SOB 2/2 aspiration PNA   # Esoph/trach fisulta s/p stent  -Pulm and GI rec appreciated   - abx ongoing   - neb treatment   - antitussive   - mucomyst      #Bacteremia   - repeat blood cx pending   - abx ongoing      #HTN  #Recurrent bronchial-esophageal fistula with stent per Dr. Ritchie   #Metastatic esophageal cancer  - hx of esophagectomy, gastric pull through, esophago-bronchial fistula with multiple stents  - PTA pancreatic enzymes   - PTA pain medications-> will increase dose to avoid using dilaudid IV     #CAD w/ hx of CABG  #HTN- losartan, metoprolol  #Anxiety/depression-  olanzapine, sertraline  #Chronic anemia           Georgette Mahoney MD           Supplementary Documentation:  Quality:  DVT Mechanical Prophylaxis:     Early ambuation      DVT Pharmacologic Prophylaxis   Medication    heparin (Porcine) 5000 UNIT/ML injection 5,000 Units    heparin (Porcine) 100 Units/mL lock flush 500 Units                Discharge is dependent on: course  At this point Mr. Cortez is expected  to be discharge to: home                        MEDICATIONS ADMINISTERED IN LAST 1 DAY:  acetylcysteine (Mucomyst) 20 % nebulizer solution 3 mL       Date Action Dose Route User    4/18/2024 1318 Given 3 mL Nebulization Lakshmi Dempsey, [facility]    4/18/2024 0725 Given 3 mL Nebulization Yohana Lilly, [facility]    4/17/2024 2048 Given 3 mL Nebulization Alejandro Wong, [facility]          azithromycin (Zithromax) 500 mg in sodium chloride 0.9% 250mL IVPB premix       Date Action Dose Route User    4/18/2024 0817 New Bag 500 mg Intravenous Maria D Christian RN          cetirizine (ZyrTEC) tab 10 mg       Date Action Dose Route User    4/18/2024 0834 Given 10 mg Per J Tube Maria D Christian RN    4/17/2024 2044 Given 10 mg Per J Tube Mame Stone RN          gabapentin (Neurontin) cap 300 mg       Date Action Dose Route User    4/18/2024 0832 Given 300 mg Oral Maria D Christian RN    4/17/2024 2044 Given 300 mg Oral Mame Stone RN          heparin (Porcine) 5000 UNIT/ML injection 5,000 Units       Date Action Dose Route User    4/18/2024 1405 Given 5,000 Units Subcutaneous (Right Lower Abdomen) Maria D Christian RN    4/18/2024 0535 Given 5,000 Units Subcutaneous (Right Lower Abdomen) Mame Stone RN    4/17/2024 2044 Given 5,000 Units Subcutaneous (Left Lower Abdomen) Mame Stone RN          HYDROmorphone (Dilaudid) 1 MG/ML injection 0.8 mg       Date Action Dose Route User    4/18/2024 1457 Given 0.8 mg Intravenous Maria D Christian RN    4/18/2024 1239 Given 0.8 mg Intravenous Maria D Christian RN    4/18/2024 1011 Given 0.8 mg Intravenous Maria D Christian RN    4/18/2024 0808 Given 0.8 mg Intravenous Maria D Christian RN    4/18/2024 0548 Given 0.8 mg Intravenous Mame Stone RN    4/17/2024 2043 Given 0.8 mg Intravenous Mame Stone RN    4/17/2024 1729 Given 0.8 mg Intravenous Eitan Staley RN    4/17/2024 1527 Given 0.8 mg Intravenous Rashaad Giles, DANY          ipratropium-albuterol (Duoneb)  0.5-2.5 (3) MG/3ML inhalation solution 3 mL       Date Action Dose Route User    4/18/2024 1317 Given 3 mL Nebulization Lakshmi Dempsey, [facility]    4/18/2024 0725 Given 3 mL Nebulization Yohana Lilly, P    4/17/2024 2048 Given 3 mL Nebulization Alejandro Wong, [facility]          lansoprazole (Prevacid Solutab) disintegrating tab 30 mg       Date Action Dose Route User    4/18/2024 0536 Given 30 mg Per G Tube Mame Stone RN          losartan (Cozaar) tab 50 mg       Date Action Dose Route User    4/18/2024 0833 Given 50 mg Oral Maria D Christian RN          metoclopramide (Reglan) tab 10 mg       Date Action Dose Route User    4/18/2024 1407 Given 10 mg Oral Maria D Christian RN    4/18/2024 0535 Given 10 mg Oral Mame Stone RN    4/17/2024 2044 Given 10 mg Oral Mame Stone RN    4/17/2024 1728 Given 10 mg Oral Eitan Staley RN          metoprolol tartrate (Lopressor) tab 25 mg       Date Action Dose Route User    4/18/2024 0600 Given 25 mg Oral Mame Stone RN    4/17/2024 1728 Given 25 mg Oral Eitan Staley RN          morphINE 10 MG/5ML oral solution 7.5 mg       Date Action Dose Route User    4/18/2024 1404 Given 7.5 mg Per G Tube Maria D Christian RN    4/18/2024 0535 Given 7.5 mg Per G Tube Mame Stone RN    4/18/2024 0023 Given 7.5 mg Per G Tube Mame Stone RN    4/17/2024 1845 Given 7.5 mg Per G Tube Eitan Staley RN          OLANZapine (ZyPREXA) tab 5 mg       Date Action Dose Route User    4/17/2024 2045 Given 5 mg Oral Mame Stone RN          pancrelipase (Lip-Prot-Amyl) (Zenpep) DR particles cap 25,000 Units       Date Action Dose Route User    4/18/2024 1406 Given 25,000 Units Oral Maria D Christian RN    4/18/2024 0846 Given 25,000 Units Oral Maria D Christian RN    4/17/2024 1728 Given 25,000 Units Oral Eitan Staley, DANY          piperacillin-tazobactam (Zosyn) 3.375 g in dextrose 5% 100 mL IVPB-ADDV       Date Action Dose Route User    4/18/2024 1405 New Bag 3.375  g Intravenous Maria D Christian RN    4/18/2024 0535 New Bag 3.375 g Intravenous Mame Stone RN    4/17/2024 2043 New Bag 3.375 g Intravenous Mame Stone RN          vancomycin (Firvanq) 50 mg/mL oral solution 125 mg       Date Action Dose Route User    4/18/2024 0832 Given 125 mg Oral Maria D Christian RN          sertraline (Zoloft) tab 150 mg       Date Action Dose Route User    4/18/2024 0834 Given 150 mg Oral Maria D Christian RN            Vitals (last day)       Date/Time Temp Pulse Resp BP SpO2 Weight O2 Device O2 Flow Rate (L/min) Who    04/18/24 1317 -- 74 14 -- 96 % -- -- -- DL    04/18/24 1200 97.6 °F (36.4 °C) 62 14 130/82 99 % -- None (Room air) -- JM    04/18/24 0725 -- 65 16 -- 95 % -- None (Room air) -- AF    04/18/24 0534 97.8 °F (36.6 °C) 84 16 143/89 97 % -- None (Room air) -- RA    04/17/24 2025 98 °F (36.7 °C) 65 18 128/74 96 % -- None (Room air) -- AT    04/17/24 1137 98 °F (36.7 °C) 62 16 138/91 93 % -- None (Room air) -- EL    04/17/24 0600 -- -- -- -- -- 152 lb -- -- JL    04/17/24 0400 97.4 °F (36.3 °C) 62 18 159/96 97 % -- None (Room air) 2 L/min JL    04/17/24 0000 98.1 °F (36.7 °C) 51 16 158/87 100 % -- None (Room air) 2 L/min

## 2024-04-19 ENCOUNTER — APPOINTMENT (OUTPATIENT)
Dept: GENERAL RADIOLOGY | Facility: HOSPITAL | Age: 54
End: 2024-04-19
Attending: INTERNAL MEDICINE
Payer: COMMERCIAL

## 2024-04-19 LAB
GLUCOSE BLD-MCNC: 105 MG/DL (ref 70–99)
GLUCOSE BLD-MCNC: 115 MG/DL (ref 70–99)
GLUCOSE BLD-MCNC: 76 MG/DL (ref 70–99)
GLUCOSE BLD-MCNC: 76 MG/DL (ref 70–99)
GLUCOSE BLD-MCNC: 89 MG/DL (ref 70–99)
GLUCOSE BLD-MCNC: 94 MG/DL (ref 70–99)
PLATELET # BLD AUTO: 169 10(3)UL (ref 150–450)

## 2024-04-19 PROCEDURE — 99291 CRITICAL CARE FIRST HOUR: CPT | Performed by: INTERNAL MEDICINE

## 2024-04-19 PROCEDURE — 99232 SBSQ HOSP IP/OBS MODERATE 35: CPT | Performed by: INTERNAL MEDICINE

## 2024-04-19 PROCEDURE — 71045 X-RAY EXAM CHEST 1 VIEW: CPT | Performed by: INTERNAL MEDICINE

## 2024-04-19 RX ORDER — ACETAMINOPHEN AND CODEINE PHOSPHATE 300; 30 MG/1; MG/1
1 TABLET ORAL EVERY 4 HOURS PRN
Status: DISCONTINUED | OUTPATIENT
Start: 2024-04-19 | End: 2024-04-20

## 2024-04-19 RX ORDER — MORPHINE SULFATE 15 MG/1
15 TABLET, FILM COATED, EXTENDED RELEASE ORAL EVERY 12 HOURS SCHEDULED
Status: DISCONTINUED | OUTPATIENT
Start: 2024-04-19 | End: 2024-04-19

## 2024-04-19 NOTE — PLAN OF CARE
Assumed patient care at 730. A/Ox4. Room air. Heparin vte. Strict NPO. IV abx per MAR. Cyclic tube feeds from 8-8. Dilaudid and scheduled morphine given per MAR for pain. Ambulates independent. R arm precautions. PICC in R arm and Port on R. All safety precautions are in place and will continue with plan of care.    Problem: RESPIRATORY - ADULT  Goal: Achieves optimal ventilation and oxygenation  Description: INTERVENTIONS:  - Assess for changes in respiratory status  - Assess for changes in mentation and behavior  - Position to facilitate oxygenation and minimize respiratory effort  - Oxygen supplementation based on oxygen saturation or ABGs  - Provide Smoking Cessation handout, if applicable  - Encourage broncho-pulmonary hygiene including cough, deep breathe, Incentive Spirometry  - Assess the need for suctioning and perform as needed  - Assess and instruct to report SOB or any respiratory difficulty  - Respiratory Therapy support as indicated  - Manage/alleviate anxiety  - Monitor for signs/symptoms of CO2 retention  Outcome: Progressing     Problem: PAIN - ADULT  Goal: Verbalizes/displays adequate comfort level or patient's stated pain goal  Description: INTERVENTIONS:  - Encourage pt to monitor pain and request assistance  - Assess pain using appropriate pain scale  - Administer analgesics based on type and severity of pain and evaluate response  - Implement non-pharmacological measures as appropriate and evaluate response  - Consider cultural and social influences on pain and pain management  - Manage/alleviate anxiety  - Utilize distraction and/or relaxation techniques  - Monitor for opioid side effects  - Notify MD/LIP if interventions unsuccessful or patient reports new pain  - Anticipate increased pain with activity and pre-medicate as appropriate  Outcome: Progressing

## 2024-04-19 NOTE — CM/SW NOTE
Natasha from [facility] confirms they will reach out to pt regarding the items he has not yet received to further discuss (I.e. saline bag and backpack).    RD note from today sent to [facility] via [facility].    No plans for discharge today. Awaiting clearance from pulm. Pt still intermittently requiring new supplemental oxygen - weaning in progress.     Irish Blackman, KAMERONN, RN-BC    [id number]

## 2024-04-19 NOTE — DIETARY NOTE
[facility]   part of [facility]    NUTRITION ASSESSMENT    Pt meets moderate malnutrition criteria at this time.    CRITERIA FOR MALNUTRITION DIAGNOSIS:  Criteria for severe malnutrition diagnosis: acute illness/injury related to wt loss greater than 7.5% in 3 months, body fat moderate depletion, and muscle mass moderate depletion      NUTRITION INTERVENTION:    Enteral Nutrition - Via Jtube  Recommend starting [employer] 1.4 at 140ml/hr x 12h.   This will provide 2352 kcal, 103 grams protein, 1192 mL total free water, and 100% of RDI's.   Recommend 200 mL water flush q 4 hours, TF+FWF provides 2392 mL total fluids.   If pt shows signs of abdominal discomfort, OK to decrease back to 125ml/hr x 14h      PATIENT STATUS:   4/19 - Pt seen lying in bed.  Has been cycling TF overnight and ramping up to goal of 140ml/hr x 12h.  Last night was up to 125ml/hr and pt reports he is tolerating it well. He denies any n/v or abdominal discomfort. + BM - loose, but not watery. He feels comfortable with current TF plan, and is aware he will need to adjust his home pump at discharge.  Has all his supplies at home already.  He has no questions/concerns at this time    04/16/24 Consult for TF  53/M admitted with Pneumonia of Right Lung.  Pt known to nutrition services from previous admissions. Had recent Jtube placed.  Prior to that was eating and had been on TPN for just a few days. Pt is no longer on TPN. Met with pt at bedside, no family in room.  Pt reports he was on KF 1.4 and was tolerating 65ml/hr x 20h at home PTA.  He endorses no issues with n/v/d.  He feels he has continued to lose wt, and per EMR he has lost 10% of his body wt x 1 mo.  He is asking to cycle his feeds. Discussed process of cycling feeds.  Pt is awaiting esophageal surgery later this month, and is strict NPO at this time per GI.  Pt with moderate muscle and fat depletion . NKFA.  All questions answered at time of visit.  TF recs as above. Recommend  reweigh via standing scale as able to confirm wt changes and monitor for wt gain.    ANTHROPOMETRICS:  Ht: 188 cm (6' 2\")  Wt: 68.9 kg (152 lb).   BMI: Body mass index is 19.52 kg/m².  IBW: 86.4 kg      WEIGHT HISTORY:   Weight loss: Yes, Severe Wt loss of 18 lbs, 10%, over 1 months     Wt Readings from Last 10 Encounters:   04/17/24 68.9 kg (152 lb)   04/08/24 68.4 kg (150 lb 14.4 oz)   03/25/24 78.7 kg (173 lb 8 oz)   03/20/24 77.1 kg (170 lb)   03/14/24 77.1 kg (170 lb)   03/12/24 82.6 kg (182 lb)   03/07/24 82.4 kg (181 lb 10.5 oz)   02/22/24 79.6 kg (175 lb 8 oz)   02/20/24 80.3 kg (177 lb)   02/19/24 80.5 kg (177 lb 8 oz)        NUTRITION:  Diet:       Procedures    NPO      Food Allergies: No  Cultural/Ethnic/Jehovah's witness Preferences Addressed: Yes    Percent Meals Eaten (last 3 days)       None            GI system review: WNL Last BM: 4/16  Skin and wounds: none    NUTRITION RELATED PHYSICAL FINDINGS:     1. Body Fat/Muscle Mass: moderate depletion body fat Orbital fat pad and Buccal fat pad and moderate muscle depletion Temple region and Clavicle region     2. Fluid Accumulation: none     NUTRITION PRESCRIPTION: 68.9 kg  Calories: 4851-3777 calories/day (30-35 kcal/kg)  Protein:  grams protein/day (1.2-1.5 grams protein per kg)  Fluid: ~1 ml/kcal or per MD discretion    NUTRITION DIAGNOSIS/PROBLEM:  Malnutrition related to physiological causes and increased nutritional demands for healing as evidenced by documented/reported unintentional weight loss, loss of fat mass, and loss of muscle mass      MONITOR AND EVALUATE/NUTRITION GOALS:  Weight stable within 1 to 2 lbs during admission - New  Tolerate alternative nutrition at 100% of goal - New      MEDICATIONS:  Abx, Prevacid,    LABS:  Reviewed    Pt is at Moderate nutrition risk    Fauzia Bonds RD, LDN, CNSC  Clinical Dietitian  Phone [phone]

## 2024-04-19 NOTE — PAYOR COMM NOTE
--------------  :  CONTINUED STAY REVIEW    Payor: [employer]  Subscriber #:  [id number]  Authorization Number: [id number]    Admit date: 24  Admit time: 10:13 AM    PULMONARY:      Subjective:  Dnotae Cortez Jr. is a(n) 53 year old male remains afebrile  Feels much better today minimally coughing remains productive small amounts of foamy clear  Weaned down to 1 to 2 L currently  Remains on Dilaudid for back of throat pain, right upper quadrant right lower chest pain-overall improved  Tolerating nocturnal feedings     Objective:  /76 (BP Location: Left arm)   Pulse 61   Temp 98.6 °F (37 °C) (Oral)   Resp 18   Ht 6' 2\" (1.88 m)   Wt 152 lb (68.9 kg)   SpO2 95%   BMI 19.52 kg/m² currently on 1 L        Temp (24hrs), Av °F (36.7 °C), Min:97.6 °F (36.4 °C), Max:98.6 °F (37 °C)         Assessment:  Acute hypoxemic respiratory failure: Continues to require 1 to 2 L  Bilateral aspiration/fistula  pneumonia   blood cultures positive 1/2  for proprionibacterium compatible with contaminant-repeat cultures thus far negative  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 Difficile-remains on prophylactic dosing     Plan:  Continue to wean FiO2 as tolerated  Aggressive bronchopulmonary toilet  Plan to continue IV antibiotics until ready for discharge  Repeat chest x-ray     RISHI Zavala MD  2024  45min cct   8:01 AM       HOSPITALIST:    Assessment & Plan:  #SOB 2/2 aspiration PNA   # Esoph/trach fisulta s/p stent  -Pulm and GI rec appreciated   - abx ongoing   - neb treatment   - antitussive   - mucomyst   - continue bronchpulm toilet  - check repeat CXR  today     #Bacteremia   - repeat blood cx NGTD  - abx ongoing      #HTN  #Recurrent bronchial-esophageal fistula with stent per Dr. Ritchie   #Metastatic esophageal cancer  - hx of esophagectomy, gastric pull through, esophago-bronchial fistula with multiple stents  - PTA pancreatic enzymes   - PTA pain medications-> will increase dose to avoid using dilaudid IV     #CAD w/ hx of CABG  #HTN- losartan, metoprolol  #Anxiety/depression-  olanzapine, sertraline  #Chronic anemia     Charles Maguire MD          MEDICATIONS ADMINISTERED IN LAST 1 DAY:  acetylcysteine (Mucomyst) 20 % nebulizer solution 3 mL       Date Action Dose Route User    4/18/2024 1956 Given 3 mL Nebulization Delia Robins RCP    4/18/2024 1318 Given 3 mL Nebulization Lakshmi Dempsey RCP          baclofen (Lioresal) tab 10 mg       Date Action Dose Route User    4/19/2024 1202 Given 10 mg Oral Eitan Staley RN          cetirizine (ZyrTEC) tab 10 mg       Date Action Dose Route User    4/19/2024 0816 Given 10 mg Per J Tube Eitan Staley RN          gabapentin (Neurontin) cap 300 mg       Date Action Dose Route User    4/19/2024 0816 Given 300 mg Oral Eitan Staley RN    4/18/2024 2210 Given 300 mg Oral Marisol Raymond RN          heparin (Porcine) 5000 UNIT/ML injection 5,000 Units       Date Action Dose Route User    4/19/2024 0600 Given 5,000 Units Subcutaneous (Right Lower Abdomen) Marisol Raymond RN    4/18/2024 2210 Given 5,000 Units Subcutaneous (Left Lower Abdomen) Marisol Raymond RN    4/18/2024 1405 Given 5,000 Units Subcutaneous (Right Lower Abdomen) Maria D Christian, DANY          HYDROcodone-acetaminophen (Norco) 5-325 MG per tab 1 tablet       Date Action Dose Route User    4/18/2024 2105 Given 1 tablet Oral Marisol Raymond RN          HYDROmorphone (Dilaudid) 1 MG/ML injection 0.8 mg       Date Action Dose Route User    4/19/2024 1034 Given 0.8 mg Intravenous Eitan Staley RN    4/19/2024 0815 Given 0.8 mg  Intravenous Eitan Staley RN    4/19/2024 0448 Given 0.8 mg Intravenous RaymondMarisol deluna RN    4/19/2024 0222 Given 0.8 mg Intravenous Marisol Raymond, RN    4/18/2024 2229 Given 0.8 mg Intravenous RaymondMarisol brewer, RN    4/18/2024 2023 Given 0.8 mg Intravenous BarlanAri RN    4/18/2024 1714 Given 0.8 mg Intravenous Maria D Christian, RN    4/18/2024 1457 Given 0.8 mg Intravenous Maria D Christian, RN    4/18/2024 1239 Given 0.8 mg Intravenous Maria D Christian RN          ipratropium-albuterol (Duoneb) 0.5-2.5 (3) MG/3ML inhalation solution 3 mL       Date Action Dose Route User    4/19/2024 0758 Given 3 mL Nebulization Chiara Thao, [facility]    4/18/2024 1950 Given 3 mL Nebulization Delia Robins, [facility]    4/18/2024 1317 Given 3 mL Nebulization Lakshmi Dempsey, [facility]          lansoprazole (Prevacid Solutab) disintegrating tab 30 mg       Date Action Dose Route User    4/19/2024 0816 Given 30 mg Per G Tube Eitan Staley RN          losartan (Cozaar) tab 50 mg       Date Action Dose Route User    4/19/2024 0816 Given 50 mg Oral Eitan Staley RN          metoclopramide (Reglan) tab 10 mg       Date Action Dose Route User    4/19/2024 1203 Given 10 mg Oral Eitan Staley RN    4/19/2024 0601 Given 10 mg Oral Marisol Raymond RN    4/18/2024 1828 Given 10 mg Oral Maria D Christian RN    4/18/2024 1407 Given 10 mg Oral Maria D Christian RN          metoprolol tartrate (Lopressor) tab 25 mg       Date Action Dose Route User    4/19/2024 0601 Given 25 mg Oral Marisol Raymond RN    4/18/2024 1828 Given 25 mg Oral [place], Maria D, RN          morphINE 10 MG/5ML oral solution 7.5 mg       Date Action Dose Route User    4/18/2024 1404 Given 7.5 mg Per G Tube Maria D Christian, DANY          morphINE 10 MG/5ML oral solution 10 mg       Date Action Dose Route User    4/19/2024 1203 Given 10 mg Per G Tube Eitan Staley, RN    4/19/2024 0600 Given 10 mg Per G Tube Marisol Raymond RN    4/19/2024 0032  Given 10 mg Per G Tube Marisol Raymond RN    4/18/2024 1831 Given 10 mg Per G Tube Maria D Christian RN          OLANZapine (ZyPREXA) tab 5 mg       Date Action Dose Route User    4/18/2024 2105 Given 5 mg Oral Marisol Raymond RN          pancrelipase (Lip-Prot-Amyl) (Zenpep) DR particles cap 25,000 Units       Date Action Dose Route User    4/19/2024 1203 Given 25,000 Units Oral Eitan Staley RN    4/19/2024 0815 Given 25,000 Units Oral Eitan Staley RN    4/18/2024 1825 Given 25,000 Units Oral Maria D Christian RN    4/18/2024 1406 Given 25,000 Units Oral Maria D Christian RN          piperacillin-tazobactam (Zosyn) 3.375 g in dextrose 5% 100 mL IVPB-ADDV       Date Action Dose Route User    4/19/2024 0600 New Bag 3.375 g Intravenous Marisol Raymond RN    4/18/2024 2210 New Bag 3.375 g Intravenous Marisol Raymond RN    4/18/2024 1405 New Bag 3.375 g Intravenous Maria D Christian RN          vancomycin (Firvanq) 50 mg/mL oral solution 125 mg       Date Action Dose Route User    4/19/2024 0815 Given 125 mg Oral Eitan Staley RN          sertraline (Zoloft) tab 150 mg       Date Action Dose Route User    4/19/2024 0816 Given 150 mg Oral Eitan Staley RN            Vitals (last day)       Date/Time Temp Pulse Resp BP SpO2 Weight O2 Device O2 Flow Rate (L/min) [facility]    04/19/24 1154 97.7 °F (36.5 °C) 60 18 138/89 95 % -- None (Room air) -- MH    04/19/24 0740 98.6 °F (37 °C) 61 18 125/76 95 % -- None (Room air) -- AM    04/18/24 2021 97.8 °F (36.6 °C) 62 20 147/83 95 % -- -- -- OG    04/18/24 1317 -- 74 14 -- 96 % -- -- -- DL    04/18/24 1200 97.6 °F (36.4 °C) 62 14 130/82 99 % -- None (Room air) -- JM    04/18/24 0725 -- 65 16 -- 95 % -- None (Room air) -- AF    04/18/24 0534 97.8 °F (36.6 °C) 84 16 143/89 97 % -- None (Room air) -- RA

## 2024-04-19 NOTE — PROGRESS NOTES
[facility]  Progress Note    Dontae Cortez Jr. Patient Status:  Inpatient    10/15/1970 MRN [id number]   Location [facility] 1NE-A Attending Georgette Mahoney MD   Hosp Day # 3 PCP Adrian Horowitz MD     Subjective:  Dontae Cortez Jr. is a(n) 53 year old male remains afebrile  Feels much better today minimally coughing remains productive small amounts of foamy clear  Weaned down to 1 to 2 L currently  Remains on Dilaudid for back of throat pain, right upper quadrant right lower chest pain-overall improved  Tolerating nocturnal feedings    Objective:  /76 (BP Location: Left arm)   Pulse 61   Temp 98.6 °F (37 °C) (Oral)   Resp 18   Ht 6' 2\" (1.88 m)   Wt 152 lb (68.9 kg)   SpO2 95%   BMI 19.52 kg/m² currently on 1 L      Temp (24hrs), Av °F (36.7 °C), Min:97.6 °F (36.4 °C), Max:98.6 °F (37 °C)      Intake/Output:    Intake/Output Summary (Last 24 hours) at 2024 0801  Last data filed at 2024 0648  Gross per 24 hour   Intake 1800 ml   Output 1900 ml   Net -100 ml       Physical Exam:   General: alert, cooperative, oriented.  No respiratory distress.   Head: Normocephalic, without obvious abnormality, atraumatic.   Throat: Lips, mucosa, and tongue normal.  No thrush noted.  No obvious oral lesion   Neck: trachea midline, no adenopathy, no thyromegaly. No JVD.   Lungs: Dullness bilaterally rales right lower lobe no auscultated wheeze   Chest wall: No tenderness or deformity.   Heart: Regular rate and rhythm no ectopy   Abdomen: soft, non-distended, no masses, no guarding, no     Rebound.  Flat nontender no diarrhea   Extremity: Thin without edema nontender   Skin: No rashes or lesions.   Neurological: Alert, interactive, no focal deficits    Lab Data Review:  Recent Labs     24  0444   WBC 4.6   HGB 10.7*   .0     Recent Labs     24  0444      K 3.9      CO2 29.0   BUN 7*   CREATSERUM 0.49*     Recent Labs   Lab 24  0420   PTP 14.6   INR  1.13   PTT 32.5       Cultures: Blood culture 4/16 1 of 2 positive gram-positive rods ID still pending  Repeat thus far no growth  Sputum thus far normal respiratory zeinab  Urine culture negative    Radiology:  No results found.  CTA reviewed      Medications reviewed     Assessment and Plan:   Patient Active Problem List   Diagnosis    Primary hypertension    Hyperlipidemia with target low density lipoprotein (LDL) cholesterol less than 70 mg/dL    Coronary artery disease involving coronary bypass graft of native heart with angina pectoris (HCC)    S/P CABG x 4    Nontoxic multinodular goiter    Pulmonary nodules    Thrombophlebitis leg    BRANDAN (generalized anxiety disorder)    Right shoulder Arthroscopy acromioplasty ,distal  clavicle resection, rotator cuff/labral debridment  Global 05/13/2021    Right bicipital tenosynovitis    Malignant neoplasm of esophagus (HCC)    Pleural effusion    Esophageal anastomotic leak    Esophageal obstruction    On total parenteral nutrition (TPN)    Mechanical complication of esophagostomy (HCC)    Abdominal pain of unknown etiology    Migration of esophageal stent    Gastroparesis    Esophageal carcinoma (HCC)    Normocytic anemia    Esophageal fistula    Subacute cough    Acquired bronchoesophageal fistula (HCC)    Pneumonia of both lower lobes due to infectious organism    Malignant neoplasm of pancreas (HCC)    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 difficile colitis    Dysphagia, unspecified type    Dyspnea and respiratory abnormalities    Hypokalemia    Dysphagia    Narcotic drug use    History of esophageal cancer    Palliative care by specialist    Neoplasm related  pain    Endobronchial mass    Hyponatremia    Thrombocytopenia (HCC)    Acute kidney injury (HCC)    Hyperglycemia    Aspiration pneumonitis (HCC)    C. difficile diarrhea    Aspiration pneumonia (HCC)    Malignant neoplasm metastatic to liver (HCC)    Hyperlipidemia    Nausea vomiting and diarrhea    Fistula, bronchoesophageal (HCC)    Iron deficiency anemia, unspecified iron deficiency anemia type    Azotemia    Palliative care encounter    Goals of care, counseling/discussion    Cancer related pain    Anemia    Fistula    Acute cough    Pneumonia of right lung due to infectious organism, unspecified part of lung    Bacteremia       Assessment:  Acute hypoxemic respiratory failure: Continues to require 1 to 2 L  Bilateral aspiration/fistula  pneumonia   blood cultures positive 1/2  for proprionibacterium compatible with contaminant-repeat cultures thus far negative  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 Difficile-remains on prophylactic dosing    Plan:  Continue to wean FiO2 as tolerated  Aggressive bronchopulmonary toilet  Plan to continue IV antibiotics until ready for discharge  Repeat chest x-ray    CC     Mago Zavala MD  4/19/2024  45min cct   8:01 AM

## 2024-04-19 NOTE — PROGRESS NOTES
[facility]   part of [facility]     Hospitalist Progress Note     Dontae Cortez . Patient Status:  Inpatient    10/15/1970 MRN [id number]   Location [facility] 1NE-A Attending Georgette Mahoney MD   Hosp Day # 3 PCP Adrian Horowitz MD     Chief Complaint: cough better     Subjective:     Patient without acute events overnight. Still with pain. No F/C. On 1-2L. Cough better.     Objective:    Review of Systems:   A comprehensive review of systems was completed; pertinent positive and negatives stated in subjective.    Vital signs:  Temp:  [97.6 °F (36.4 °C)-98.6 °F (37 °C)] 98.6 °F (37 °C)  Pulse:  [61-74] 61  Resp:  [14-20] 18  BP: (125-147)/(76-83) 125/76  SpO2:  [95 %-99 %] 95 %    Physical Exam:    General: No acute distress  Respiratory: No wheezes, no rhonchi  Cardiovascular: S1, S2, regular rate and rhythm  Abdomen: Soft, Non-tender, non-distended, positive bowel sounds  Neuro: No new focal deficits.   Extremities: No edema      Diagnostic Data:    Labs:  Recent Labs   Lab 240 24  0444   WBC 12.5* 4.6   HGB 13.3 10.7*   MCV 91.1 92.5   .0 169.0   BAND 9 3   INR 1.13  --        Recent Labs   Lab 240 24  0444   * 97   BUN 14 7*   CREATSERUM 0.60* 0.49*   CA 9.4 8.6   ALB 3.0*  --    * 139   K 4.4 3.9    105   CO2 28.0 29.0   ALKPHO 110  --    AST 27  --    ALT 25  --    BILT 0.3  --    TP 7.9  --        Estimated Creatinine Clearance: 168 mL/min (A) (based on SCr of 0.49 mg/dL (L)).    Recent Labs   Lab 24  0420   TROPHS 9       Recent Labs   Lab 24   PTP 14.6   INR 1.13                  Microbiology    Hospital Encounter on 24   1. Blood Culture     Status: None (Preliminary result)    Collection Time: 24  7:21 PM    Specimen: Blood,peripheral   Result Value Ref Range    Blood Culture Result No Growth 1 Day N/A   2. Urine Culture, Routine     Status: None    Collection Time: 24  7:59 AM     Specimen: Urine, clean catch   Result Value Ref Range    Urine Culture No Growth at 18-24 hrs. N/A         Imaging: Reviewed in Epic.    Medications:    morphINE ER  15 mg Oral Q12H    morphINE  10 mg Per G Tube 4 times per day    metoprolol tartrate  25 mg Oral 2x Daily(Beta Blocker)    cetirizine  10 mg Per J Tube Daily    losartan  50 mg Oral Daily    sertraline  150 mg Oral Daily    gabapentin  300 mg Oral BID    metoclopramide  10 mg Oral TID AC and HS    OLANZapine  5 mg Oral Nightly    lipase-protease-amylase (Lip-Prot-Amyl)  25,000 Units Oral TID CC    heparin  5,000 Units Subcutaneous Q8H INNA    piperacillin-tazobactam  3.375 g Intravenous Q8H    vancomycin  125 mg Oral Daily    lansoprazole  30 mg Per G Tube QAM AC    ipratropium-albuterol  3 mL Nebulization Q6H WA       Assessment & Plan:      #SOB 2/2 aspiration PNA   # Esoph/trach fisulta s/p stent  -Pulm and GI rec appreciated   - abx ongoing   - neb treatment   - antitussive   - mucomyst   - continue bronchpulm toilet  - check repeat CXR today    #Bacteremia   - repeat blood cx NGTD  - abx ongoing      #HTN  #Recurrent bronchial-esophageal fistula with stent per Dr. Ritchie   #Metastatic esophageal cancer  - hx of esophagectomy, gastric pull through, esophago-bronchial fistula with multiple stents  - PTA pancreatic enzymes   - PTA pain medications-> will increase dose to avoid using dilaudid IV     #CAD w/ hx of CABG  #HTN- losartan, metoprolol  #Anxiety/depression-  olanzapine, sertraline  #Chronic anemia     Charles Maguire MD      Supplementary Documentation:     Quality:  DVT Mechanical Prophylaxis:     Early ambuation  DVT Pharmacologic Prophylaxis   Medication    heparin (Porcine) 5000 UNIT/ML injection 5,000 Units    heparin (Porcine) 100 Units/mL lock flush 500 Units                Code Status: Full Code  Neumann: No urinary catheter in place  Neumann Duration (in days):   Central line:    SHUBHAM:     Discharge is dependent on: course  At this  point Mr. Cortez is expected to be discharge to: home     The 21st Century Cures Act makes medical notes like these available to patients in the interest of transparency. Please be advised this is a medical document. Medical documents are intended to carry relevant information, facts as evident, and the clinical opinion of the practitioner. The medical note is intended as peer to peer communication and may appear blunt or direct. It is written in medical language and may contain abbreviations or verbiage that are unfamiliar.

## 2024-04-20 VITALS
HEIGHT: 74 IN | WEIGHT: 152 LBS | DIASTOLIC BLOOD PRESSURE: 77 MMHG | TEMPERATURE: 97 F | OXYGEN SATURATION: 96 % | HEART RATE: 70 BPM | SYSTOLIC BLOOD PRESSURE: 115 MMHG | BODY MASS INDEX: 19.51 KG/M2 | RESPIRATION RATE: 18 BRPM

## 2024-04-20 LAB
ALBUMIN SERPL-MCNC: 2.7 G/DL (ref 3.4–5)
ALBUMIN/GLOB SERPL: 0.6 {RATIO} (ref 1–2)
ALP LIVER SERPL-CCNC: 107 U/L
ALT SERPL-CCNC: 20 U/L
ANION GAP SERPL CALC-SCNC: 3 MMOL/L (ref 0–18)
AST SERPL-CCNC: 19 U/L (ref 15–37)
BASOPHILS # BLD: 0 X10(3) UL (ref 0–0.2)
BASOPHILS NFR BLD: 0 %
BILIRUB SERPL-MCNC: 0.3 MG/DL (ref 0.1–2)
BUN BLD-MCNC: 15 MG/DL (ref 9–23)
CALCIUM BLD-MCNC: 9.3 MG/DL (ref 8.5–10.1)
CHLORIDE SERPL-SCNC: 105 MMOL/L (ref 98–112)
CO2 SERPL-SCNC: 31 MMOL/L (ref 21–32)
CREAT BLD-MCNC: 0.63 MG/DL
EGFRCR SERPLBLD CKD-EPI 2021: 113 ML/MIN/1.73M2 (ref 60–?)
EOSINOPHIL # BLD: 0.38 X10(3) UL (ref 0–0.7)
EOSINOPHIL NFR BLD: 6 %
ERYTHROCYTE [DISTWIDTH] IN BLOOD BY AUTOMATED COUNT: 16.2 %
GLOBULIN PLAS-MCNC: 4.2 G/DL (ref 2.8–4.4)
GLUCOSE BLD-MCNC: 132 MG/DL (ref 70–99)
GLUCOSE BLD-MCNC: 132 MG/DL (ref 70–99)
GLUCOSE BLD-MCNC: 69 MG/DL (ref 70–99)
GLUCOSE BLD-MCNC: 91 MG/DL (ref 70–99)
HCT VFR BLD AUTO: 37 %
HGB BLD-MCNC: 11.8 G/DL
LYMPHOCYTES NFR BLD: 1.32 X10(3) UL (ref 1–4)
LYMPHOCYTES NFR BLD: 21 %
MCH RBC QN AUTO: 29 PG (ref 26–34)
MCHC RBC AUTO-ENTMCNC: 31.9 G/DL (ref 31–37)
MCV RBC AUTO: 90.9 FL
MONOCYTES # BLD: 0.63 X10(3) UL (ref 0.1–1)
MONOCYTES NFR BLD: 10 %
MORPHOLOGY: NORMAL
MYELOCYTES # BLD: 0.06 X10(3) UL
MYELOCYTES NFR BLD: 1 %
NEUTROPHILS # BLD AUTO: 3.75 X10 (3) UL (ref 1.5–7.7)
NEUTROPHILS NFR BLD: 60 %
NEUTS BAND NFR BLD: 2 %
NEUTS HYPERSEG # BLD: 3.91 X10(3) UL (ref 1.5–7.7)
OSMOLALITY SERPL CALC.SUM OF ELEC: 287 MOSM/KG (ref 275–295)
PLATELET # BLD AUTO: 184 10(3)UL (ref 150–450)
PLATELET MORPHOLOGY: NORMAL
POTASSIUM SERPL-SCNC: 4.6 MMOL/L (ref 3.5–5.1)
PROT SERPL-MCNC: 6.9 G/DL (ref 6.4–8.2)
RBC # BLD AUTO: 4.07 X10(6)UL
SODIUM SERPL-SCNC: 139 MMOL/L (ref 136–145)
TOTAL CELLS COUNTED BLD: 100
WBC # BLD AUTO: 6.3 X10(3) UL (ref 4–11)

## 2024-04-20 PROCEDURE — 99239 HOSP IP/OBS DSCHRG MGMT >30: CPT | Performed by: INTERNAL MEDICINE

## 2024-04-20 PROCEDURE — 99232 SBSQ HOSP IP/OBS MODERATE 35: CPT | Performed by: INTERNAL MEDICINE

## 2024-04-20 RX ORDER — ACETAMINOPHEN AND CODEINE PHOSPHATE 300; 30 MG/1; MG/1
1 TABLET ORAL EVERY 4 HOURS PRN
Qty: 30 TABLET | Refills: 0 | Status: SHIPPED | OUTPATIENT
Start: 2024-04-20 | End: 2024-04-20

## 2024-04-20 RX ORDER — MORPHINE SULFATE 10 MG/5ML
10 SOLUTION ORAL EVERY 6 HOURS SCHEDULED
Qty: 300 ML | Refills: 0 | Status: SHIPPED | OUTPATIENT
Start: 2024-04-20

## 2024-04-20 RX ORDER — ALBUTEROL SULFATE 2.5 MG/3ML
2.5 SOLUTION RESPIRATORY (INHALATION) 3 TIMES DAILY
Qty: 90 EACH | Refills: 0 | Status: SHIPPED | OUTPATIENT
Start: 2024-04-20

## 2024-04-20 RX ORDER — AMOXICILLIN AND CLAVULANATE POTASSIUM 400; 57 MG/5ML; MG/5ML
875 POWDER, FOR SUSPENSION ORAL 2 TIMES DAILY
Qty: 176 ML | Refills: 0 | Status: SHIPPED | OUTPATIENT
Start: 2024-04-20 | End: 2024-04-28

## 2024-04-20 RX ORDER — MORPHINE SULFATE 10 MG/5ML
10 SOLUTION ORAL EVERY 6 HOURS SCHEDULED
Qty: 300 ML | Refills: 0 | Status: SHIPPED | OUTPATIENT
Start: 2024-04-20 | End: 2024-04-20

## 2024-04-20 RX ORDER — VANCOMYCIN HYDROCHLORIDE 50 MG/ML
125 KIT ORAL DAILY
Qty: 20 ML | Refills: 0 | Status: SHIPPED | OUTPATIENT
Start: 2024-04-21 | End: 2024-04-29

## 2024-04-20 RX ORDER — VANCOMYCIN HYDROCHLORIDE 50 MG/ML
125 KIT ORAL DAILY
Qty: 20 ML | Refills: 0 | Status: SHIPPED | OUTPATIENT
Start: 2024-04-21 | End: 2024-04-20

## 2024-04-20 RX ORDER — HYDROCODONE BITARTRATE AND ACETAMINOPHEN 5; 325 MG/1; MG/1
1 TABLET ORAL EVERY 4 HOURS PRN
Qty: 20 TABLET | Refills: 0 | Status: SHIPPED | OUTPATIENT
Start: 2024-04-20

## 2024-04-20 NOTE — PLAN OF CARE
A/O X4. Oxygen saturation 96% on room air. Tolerating TF at 140 goal rate. Prn pain meds given. Slept 2200- 0400.   Problem: RESPIRATORY - ADULT  Goal: Achieves optimal ventilation and oxygenation  Description: INTERVENTIONS:  - Assess for changes in respiratory status  - Assess for changes in mentation and behavior  - Position to facilitate oxygenation and minimize respiratory effort  - Oxygen supplementation based on oxygen saturation or ABGs  - Provide Smoking Cessation handout, if applicable  - Encourage broncho-pulmonary hygiene including cough, deep breathe, Incentive Spirometry  - Assess the need for suctioning and perform as needed  - Assess and instruct to report SOB or any respiratory difficulty  - Respiratory Therapy support as indicated  - Manage/alleviate anxiety  - Monitor for signs/symptoms of CO2 retention  Outcome: Progressing

## 2024-04-20 NOTE — PLAN OF CARE
Patient is alert and orientated, VSS, RA, afebrile and has 8/10 throat and abdominal pain relieved with morphine. All meds given per MAR through g-tube. Plan for discharge. Spouse at bedside. Call light and safety precautions are in place.     Problem: PAIN - ADULT  Goal: Verbalizes/displays adequate comfort level or patient's stated pain goal  Description: INTERVENTIONS:  - Encourage pt to monitor pain and request assistance  - Assess pain using appropriate pain scale  - Administer analgesics based on type and severity of pain and evaluate response  - Implement non-pharmacological measures as appropriate and evaluate response  - Consider cultural and social influences on pain and pain management  - Manage/alleviate anxiety  - Utilize distraction and/or relaxation techniques  - Monitor for opioid side effects  - Notify MD/LIP if interventions unsuccessful or patient reports new pain  - Anticipate increased pain with activity and pre-medicate as appropriate  Outcome: Progressing     Problem: SAFETY ADULT - FALL  Goal: Free from fall injury  Description: INTERVENTIONS:  - Assess pt frequently for physical needs  - Identify cognitive and physical deficits and behaviors that affect risk of falls.  - [place] fall precautions as indicated by assessment.  - Educate pt/family on patient safety including physical limitations  - Instruct pt to call for assistance with activity based on assessment  - Modify environment to reduce risk of injury  - Provide assistive devices as appropriate  - Consider OT/PT consult to assist with strengthening/mobility  - Encourage toileting schedule  Outcome: Progressing     Problem: Patient/Family Goals  Goal: Patient/Family Long Term Goal  Description: Patient's Long Term Goal:     Interventions:  - See additional Care Plan goals for specific interventions  Outcome: Progressing  Goal: Patient/Family Short Term Goal  Description: Patient's Short Term Goal:     Interventions:   - See  additional Care Plan goals for specific interventions  Outcome: Progressing     Problem: RESPIRATORY - ADULT  Goal: Achieves optimal ventilation and oxygenation  Description: INTERVENTIONS:  - Assess for changes in respiratory status  - Assess for changes in mentation and behavior  - Position to facilitate oxygenation and minimize respiratory effort  - Oxygen supplementation based on oxygen saturation or ABGs  - Provide Smoking Cessation handout, if applicable  - Encourage broncho-pulmonary hygiene including cough, deep breathe, Incentive Spirometry  - Assess the need for suctioning and perform as needed  - Assess and instruct to report SOB or any respiratory difficulty  - Respiratory Therapy support as indicated  - Manage/alleviate anxiety  - Monitor for signs/symptoms of CO2 retention  Outcome: Progressing

## 2024-04-20 NOTE — PROGRESS NOTES
NURSING DISCHARGE NOTE    Discharged Home via Wheelchair.  Accompanied by Support staff  Belongings Taken by patient/family.    Removed PICC and de-accessed port. All belongings were taken. All questions were answered. Taken downstairs by [facility] to meet spouse in [facility].

## 2024-04-20 NOTE — PROGRESS NOTES
[facility]  Progress Note    Dontae Cortez Jr. Patient Status:  Inpatient    10/15/1970 MRN [id number]   Location [facility] 4NW-A Attending Georgette Mahoney MD   Hosp Day # 4 PCP Adrian Horowitz MD     Subjective:  Dontae Cortez Jr. is a(n) 53 year old male remains afebrile  Feels much better minimal cough -productive now clear  Denies any chest pain or shortness of breath  On room air  Tolerating tube feedings    Objective:  /77 (BP Location: Left arm)   Pulse 70   Temp 97.4 °F (36.3 °C) (Oral)   Resp 18   Ht 6' 2\" (1.88 m)   Wt 152 lb (68.9 kg)   SpO2 96%   BMI 19.52 kg/m² room air      Temp (24hrs), Av.9 °F (36.6 °C), Min:97.4 °F (36.3 °C), Max:98.5 °F (36.9 °C)      Intake/Output:  No intake or output data in the 24 hours ending 24 0950    Physical Exam:   General: alert, cooperative, oriented.  No respiratory distress.   Head: Normocephalic, without obvious abnormality, atraumatic.   Throat: Lips, mucosa, and tongue normal.  No thrush noted.   Neck: trachea midline, no adenopathy, no thyromegaly. No JVD.   Lungs: Rales slight dullness right base remainder seems clear   Chest wall: No tenderness or deformity.   Heart: Regular rate and rhythm   Abdomen: soft, non-distended, no masses, no guarding, no     Rebound.  Flat denies any diarrhea no tenderness   Extremity: Thin nontender   Skin: No rashes or lesions.   Neurological: Alert, interactive, no focal deficits    Lab Data Review:  Recent Labs     24  1203 24  0505   WBC  --  6.3   HGB  --  11.8*   .0 184.0     Recent Labs     24  0505      K 4.6      CO2 31.0   BUN 15   CREATSERUM 0.63*     Recent Labs   Lab 24  0420   PTP 14.6   INR 1.13   PTT 32.5       Cultures: Follow-up blood culture  remains negative compatible with contaminant  Sputum remains normal respiratory zeinab    Radiology:  XR CHEST AP PORTABLE  (CPT=71045)    Result Date: 2024  CONCLUSION:  1. No new  focal consolidation.  Please see details as above.   LOCATION:  [facility]      Dictated by (CST): Isidra Briones MD on 4/19/2024 at 11:56 AM     Finalized by (CST): Isidra Briones MD on 4/19/2024 at 12:00 PM      Chest x-rays reviewed decrease in slight increased interstitial markings from admission film --right mid field increased markings about the same right lower lobe pleural-parenchymal changes  CT reviewed       Medications reviewed     Assessment and Plan:   Patient Active Problem List   Diagnosis    Primary hypertension    Hyperlipidemia with target low density lipoprotein (LDL) cholesterol less than 70 mg/dL    Coronary artery disease involving coronary bypass graft of native heart with angina pectoris (HCC)    S/P CABG x 4    Nontoxic multinodular goiter    Pulmonary nodules    Thrombophlebitis leg    BRANDAN (generalized anxiety disorder)    Right shoulder Arthroscopy acromioplasty ,distal  clavicle resection, rotator cuff/labral debridment  Global 05/13/2021    Right bicipital tenosynovitis    Malignant neoplasm of esophagus (HCC)    Pleural effusion    Esophageal anastomotic leak    Esophageal obstruction    On total parenteral nutrition (TPN)    Mechanical complication of esophagostomy (HCC)    Abdominal pain of unknown etiology    Migration of esophageal stent    Gastroparesis    Esophageal carcinoma (HCC)    Normocytic anemia    Esophageal fistula    Subacute cough    Acquired bronchoesophageal fistula (HCC)    Pneumonia of both lower lobes due to infectious organism    Malignant neoplasm of pancreas (HCC)    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 difficile colitis    Dysphagia, unspecified type    Dyspnea and  respiratory abnormalities    Hypokalemia    Dysphagia    Narcotic drug use    History of esophageal cancer    Palliative care by specialist    Neoplasm related pain    Endobronchial mass    Hyponatremia    Thrombocytopenia (HCC)    Acute kidney injury (HCC)    Hyperglycemia    Aspiration pneumonitis (HCC)    C. difficile diarrhea    Aspiration pneumonia (HCC)    Malignant neoplasm metastatic to liver (HCC)    Hyperlipidemia    Nausea vomiting and diarrhea    Fistula, bronchoesophageal (HCC)    Iron deficiency anemia, unspecified iron deficiency anemia type    Azotemia    Palliative care encounter    Goals of care, counseling/discussion    Cancer related pain    Anemia    Fistula    Acute cough    Pneumonia of right lung due to infectious organism, unspecified part of lung    Bacteremia       Assessment:  Acute hypoxemic respiratory failure: Weaned to room air  Bilateral aspiration/fistula  pneumonia clinically responding  blood cultures positive 1/2  for proprionibacterium compatible with contaminant-repeat cultures remains neg   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 Difficile-remains on prophylactic dosing       Plan:  Okay to home from pulmonary standpoint  Will be following up with surgery prior to the planned procedure  Aware of n.p.o. status  Follows with Dr. Julius Zavala MD  4/20/2024  9:50 AM

## 2024-04-20 NOTE — DISCHARGE SUMMARY
[place] HOSPITALIST  DISCHARGE SUMMARY     Dontae Cortez Jr. Patient Status:  Inpatient    10/15/1970 MRN [id number]   Location [facility] 4NW-A Attending No att. providers found   Hosp Day # 4 PCP Adrian Horowitz MD     Date of Admission: 2024  Date of Discharge:  2024     Discharge Disposition: Home or Self Care    Discharge Diagnosis:    # SOB 2/2 aspiration PNA   # Esoph/trach fisulta s/p stent  -Pulm and GI rec appreciated   - abx ongoing   - neb treatment        #Bacteremia   - repeat blood cx NGTD  - abx ongoing      #HTN  #Recurrent bronchial-esophageal fistula with stent per Dr. Ritchie   #Metastatic esophageal cancer  - hx of esophagectomy, gastric pull through, esophago-bronchial fistula with multiple stents     #CAD w/ hx of CABG  #HTN- losartan, metoprolol  #Anxiety/depression-  olanzapine, sertraline  #Chronic anemia    History of Present Illness:   Dontae Cortez Jr. is a 53 year old male with increased shortness of breath with coughing spells after taking down food.  He has a history of esophageal tracheal fistula with metastatic malignancy.  Patient has a stent placement from GI team.  Patient continues to cough and feels short of breath.  He was hypoxic in the emergency department.  He denies any chest pain fevers or chills.  No nausea vomiting or diarrhea.          Brief Synopsis: patient admitted and placed on antibiotic. He has improved and will be dc home with antibiotic. He is advised that he will be NPO and should comply.     Lace+ Score: 81  59-90 High Risk  29-58 Medium Risk  0-28   Low Risk       TCM Follow-Up Recommendation:  LACE > 58: High Risk of readmission after discharge from the hospital.      Discharge Medication List:     Discharge Medications        START taking these medications        Instructions Prescription details   albuterol (2.5 MG/3ML) 0.083% Nebu  Commonly known as: Ventolin      Take 3 mL (2.5 mg total) by nebulization 3 (three) times  daily.   Quantity: 90 each  Refills: 0     amoxicillin-pot clavulanate 400-57 mg/5mL Susr  Commonly known as: Augmentin      11 mL (880 mg total) by Per J Tube route 2 (two) times daily for 8 days.   Stop taking on: April 28, 2024  Quantity: 176 mL  Refills: 0     metoprolol tartrate 25 MG Tabs  Commonly known as: Lopressor      Take 1 tablet (25 mg total) by mouth 2x Daily(Beta Blocker).   Quantity: 60 tablet  Refills: 1     morphINE 10 MG/5ML Soln  Replaces: morphINE ER 15 MG Tbcr      5 mL (10 mg total) by Per G Tube route every 6 (six) hours.   Quantity: 300 mL  Refills: 0     vancomycin 50 mg/mL Solr  Commonly known as: Firvanq      2.5 mL (125 mg total) by Per J Tube route daily for 8 days.   Stop taking on: April 29, 2024  Quantity: 20 mL  Refills: 0            CONTINUE taking these medications        Instructions Prescription details   baclofen 10 MG Tabs  Commonly known as: Lioresal      Take 1 tablet (10 mg total) by mouth 3 (three) times daily as needed.   Refills: 0     Creon 13438-31966 units Cpep  Generic drug: Pancrelipase (Lip-Prot-Amyl)      Take 2 capsules with meals and 1 capsule with snacks   Quantity: 240 capsule  Refills: 0     gabapentin 300 MG Caps  Commonly known as: Neurontin      Take 1 capsule (300 mg total) by mouth in the morning and 1 capsule (300 mg total) before bedtime.   Quantity: 180 capsule  Refills: 0     HYDROcodone-acetaminophen 5-325 MG Tabs  Commonly known as: Norco      Take 1 tablet by mouth every 4 (four) hours as needed for Pain.   Quantity: 20 tablet  Refills: 0     losartan 50 MG Tabs  Commonly known as: Cozaar      Take 1 tablet (50 mg total) by mouth daily.   Quantity: 90 tablet  Refills: 2     metoclopramide 10 MG Tabs  Commonly known as: Reglan      Take 1 tablet (10 mg total) by mouth 4 (four) times daily before meals and nightly.   Stop taking on: Alanna 3, 2024  Quantity: 120 tablet  Refills: 2     OLANZapine 5 MG Tabs  Commonly known as: ZyPREXA      Take 1  tablet (5 mg total) by mouth nightly.   Quantity: 30 tablet  Refills: 3     Omeprazole 40 MG Cpdr      Take 1 capsule (40 mg total) by mouth 2 (two) times daily before meals.   Quantity: 90 capsule  Refills: 3     sertraline 100 MG Tabs  Commonly known as: Zoloft      Take 1.5 tablets (150 mg total) by mouth daily.   Refills: 0            STOP taking these medications      metoprolol succinate ER 50 MG Tb24  Commonly known as: Toprol XL        morphINE ER 15 MG Tbcr  Commonly known as: MS Contin  Replaced by: morphINE 10 MG/5ML Soln                  Where to Get Your Medications        These medications were sent to [facility]/pharmacy #[id number] - [facility] [address] [facility] [phone], [phone]  [address] [place] [address]      Phone: [phone]   albuterol (2.5 MG/3ML) 0.083% Nebu  amoxicillin-pot clavulanate 400-57 mg/5mL Susr  HYDROcodone-acetaminophen 5-325 MG Tabs       These medications were sent to [address], [facility] [address] [phone], [phone]  [address] [facility], [place] [address], [facility] [address]      Phone: [phone]   metoprolol tartrate 25 MG Tabs  morphINE 10 MG/5ML Soln  vancomycin 50 mg/mL Solr         [place]    Follow-up appointment:   Chapo Madrid MD  [address] [id number] [phone]    Schedule an appointment as soon as possible for a visit in 2 week(s)      Adrian Horowitz MD  [address]  [facility] [id number] [phone]    Follow up      Appointments for Next 30 Days 4/21/2024 - 5/21/2024        Date Arrival Time Visit Type Length Department Provider     4/24/2024 11:30 AM  FOLLOW UP-HEM/ONC [2421] 15 min [facility] in [facility]    Patient Instructions:         Location Instructions:     **IF YOU NEED LABWORK OR AN INFUSION ALONG WITH YOUR APPOINTMENT, YOU MUST CALL TO SCHEDULE.**  Your appointment is on the [facility] campus in the Cancer  [place]. The address is [address] [place] [address]. Please register at the [facility]  on the second floor.  Masks are optional for all patients and visitors, unless otherwise indicated.               2024 12:00 PM  PALLIATIVE CARE FOLLOW UP [5071] 30 min [facility] in [place] Ana Luisa Bravo APRN    Patient Instructions:         Location Instructions:     **IF YOU NEED LABWORK OR AN INFUSION ALONG WITH YOUR APPOINTMENT, YOU MUST CALL TO SCHEDULE.**  Your appointment is on the [facility] campus in the Cancer [place]. The address is [address] [place] [address]. Please register at the [facility]  on the second floor.  Masks are optional for all patients and visitors, unless otherwise indicated.                      Vital signs:       Physical Exam:    General: No acute distress   Lungs: clear to auscultation  Cardiovascular: S1, S2  Abdomen: Soft      -----------------------------------------------------------------------------------------------  PATIENT DISCHARGE INSTRUCTIONS: See electronic chart    Georgette Mahoney MD    Total time spent on discharge plannin minutes     The  [employer] Cures Act makes medical notes like these available to patients in the interest of transparency. Please be advised this is a medical document. Medical documents are intended to carry relevant information, facts as evident, and the clinical opinion of the practitioner. The medical note is intended as peer to peer communication and may appear blunt or direct. It is written in medical language and may contain abbreviations or verbiage that are unfamiliar.

## 2024-04-22 ENCOUNTER — PATIENT OUTREACH (OUTPATIENT)
Dept: CASE MANAGEMENT | Age: 54
End: 2024-04-22

## 2024-04-22 NOTE — PAYOR COMM NOTE
--------------  CONTINUED STAY REVIEW    Payor: [employer] [facility] PPO  Subscriber #:  [id number]  Authorization Number: [id number]    Admit date: 4/16/24  Admit time: 10:13 AM    Admitting Physician: Eitan Landeros MD  Attending Physician:  No att. providers found  Primary Care Physician: Adrian Horowitz MD    REVIEW DOCUMENTATION:    4/20    Pulmonary       Lab Data Review:       Recent Labs     04/19/24  1203 04/20/24  0505   WBC  --  6.3   HGB  --  11.8*   .0 184.0          Recent Labs     04/20/24  0505      K 4.6      CO2 31.0   BUN 15   CREATSERUM 0.63*          Recent Labs   Lab 04/16/24  0420   PTP 14.6   INR 1.13   PTT 32.5         Cultures: Follow-up blood culture 4/17 remains negative compatible with contaminant  Sputum remains normal respiratory zeinab     Radiology:  XR CHEST AP PORTABLE  (CPT=71045)     Result Date: 4/19/2024  CONCLUSION:  1. No new focal consolidation.  Please see details as above.   LOCATION:  [facility]      Dictated by (CST): Isidra Briones MD on 4/19/2024 at 11:56 AM     Finalized by (CST): Isidra Briones MD on 4/19/2024 at 12:00 PM      Chest x-rays reviewed decrease in slight increased interstitial markings from admission film --right mid field increased markings about the same right lower lobe pleural-parenchymal changes  CT reviewed         Medications reviewed      Assessment and Plan:       Patient Active Problem List   Diagnosis    Primary hypertension    Hyperlipidemia with target low density lipoprotein (LDL) cholesterol less than 70 mg/dL    Coronary artery disease involving coronary bypass graft of native heart with angina pectoris (HCC)    S/P CABG x 4    Nontoxic multinodular goiter    Pulmonary nodules    Thrombophlebitis leg    BRANDAN (generalized anxiety disorder)    Right shoulder Arthroscopy acromioplasty ,distal  clavicle resection, rotator cuff/labral debridment  Global 05/13/2021    Right bicipital tenosynovitis    Malignant neoplasm of esophagus (HCC)     Pleural effusion    Esophageal anastomotic leak    Esophageal obstruction    On total parenteral nutrition (TPN)    Mechanical complication of esophagostomy (HCC)    Abdominal pain of unknown etiology    Migration of esophageal stent    Gastroparesis    Esophageal carcinoma (HCC)    Normocytic anemia    Esophageal fistula    Subacute cough    Acquired bronchoesophageal fistula (HCC)    Pneumonia of both lower lobes due to infectious organism    Malignant neoplasm of pancreas (HCC)    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 difficile colitis    Dysphagia, unspecified type    Dyspnea and respiratory abnormalities    Hypokalemia    Dysphagia    Narcotic drug use    History of esophageal cancer    Palliative care by specialist    Neoplasm related pain    Endobronchial mass    Hyponatremia    Thrombocytopenia (HCC)    Acute kidney injury (HCC)    Hyperglycemia    Aspiration pneumonitis (HCC)    C. difficile diarrhea    Aspiration pneumonia (HCC)    Malignant neoplasm metastatic to liver (HCC)    Hyperlipidemia    Nausea vomiting and diarrhea    Fistula, bronchoesophageal (HCC)    Iron deficiency anemia, unspecified iron deficiency anemia type    Azotemia    Palliative care encounter    Goals of care, counseling/discussion    Cancer related pain    Anemia    Fistula    Acute cough    Pneumonia of right lung due to infectious organism, unspecified part of lung    Bacteremia         Assessment:  Acute hypoxemic respiratory failure: Weaned to room air  Bilateral aspiration/fistula  pneumonia clinically responding  blood cultures positive 1/2  for proprionibacterium compatible with contaminant-repeat cultures remains  neg   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 Difficile-remains on prophylactic dosing        Plan:  Okay to home from pulmonary standpoint  Will be following up with surgery prior to the planned procedure  Aware of n.p.o. status      4/20      Date of Admission: 4/16/2024  Date of Discharge:  4/20/2024      Discharge Disposition: Home or Self Care     Discharge Diagnosis:     # SOB 2/2 aspiration PNA   # Esoph/trach fisulta s/p stent  -Pulm and GI rec appreciated   - abx ongoing   - neb treatment         #Bacteremia   - repeat blood cx NGTD  - abx ongoing      #HTN  #Recurrent bronchial-esophageal fistula with stent per Dr. Ritchie   #Metastatic esophageal cancer  - hx of esophagectomy, gastric pull through, esophago-bronchial fistula with multiple stents     #CAD w/ hx of CABG  #HTN- losartan, metoprolol  #Anxiety/depression-  olanzapine, sertraline  #Chronic anemia     History of Present Illness:   Dontae Cortez Jr. is a 53 year old male with increased shortness of breath with coughing spells after taking down food.  He has a history of esophageal tracheal fistula with metastatic malignancy.  Patient has a stent placement from GI team.  Patient continues to cough and feels short of breath.  He was hypoxic in the emergency department.  He denies any chest pain fevers or chills.  No nausea vomiting or diarrhea.           Brief Synopsis: patient admitted and placed on antibiotic. He has improved and will be dc home with antibiotic. He is advised that he will be NPO and should comply.           MEDICATIONS ADMINISTERED IN LAST 1 DAY:            Vitals (last day) before discharge       Date/Time Temp  Pulse Resp BP SpO2 Weight O2 Device O2 Flow Rate (L/min) [facility]    04/20/24 0416 97.4 °F (36.3 °C) 70 -- 115/77 96 % -- None (Room air) --     04/19/24 2015 98.5 °F (36.9 °C) 60 -- 116/86 94 % -- None (Room air) --     04/19/24 1600 97.8 °F (36.6 °C) 60 18 149/84 97 % -- None (Room air) -- AM    04/19/24 1154 97.7 °F (36.5 °C) 60 18 138/89 95 % -- None (Room air) --     04/19/24 0740 98.6 °F (37 °C) 61 18 125/76 95 % -- None (Room air) -- AM

## 2024-04-22 NOTE — CM/SW NOTE
Pt noted to have discharged home 4/20. Notified [facility] and sent AVS to agency via [employer].    KAMERON LyonsN, RN-BC    [phone]

## 2024-04-22 NOTE — PROGRESS NOTES
TCM chart review.  No TCM as patient is following with oncology and will be readmitted on 4/30/24 for surgery.  Encounter closing.

## 2024-04-24 ENCOUNTER — APPOINTMENT (OUTPATIENT)
Dept: HEMATOLOGY/ONCOLOGY | Facility: HOSPITAL | Age: 54
End: 2024-04-24
Attending: INTERNAL MEDICINE

## 2024-04-24 ENCOUNTER — APPOINTMENT (OUTPATIENT)
Dept: GENERAL RADIOLOGY | Facility: HOSPITAL | Age: 54
End: 2024-04-24
Attending: EMERGENCY MEDICINE
Payer: COMMERCIAL

## 2024-04-24 ENCOUNTER — HOSPITAL ENCOUNTER (EMERGENCY)
Facility: HOSPITAL | Age: 54
Discharge: HOME | End: 2024-04-24
Attending: EMERGENCY MEDICINE
Payer: COMMERCIAL

## 2024-04-24 ENCOUNTER — OFFICE VISIT (OUTPATIENT)
Dept: HEMATOLOGY/ONCOLOGY | Facility: HOSPITAL | Age: 54
End: 2024-04-24
Attending: INTERNAL MEDICINE
Payer: COMMERCIAL

## 2024-04-24 VITALS
HEART RATE: 97 BPM | WEIGHT: 148.19 LBS | BODY MASS INDEX: 19 KG/M2 | DIASTOLIC BLOOD PRESSURE: 112 MMHG | RESPIRATION RATE: 20 BRPM | TEMPERATURE: 97 F | SYSTOLIC BLOOD PRESSURE: 168 MMHG | OXYGEN SATURATION: 95 %

## 2024-04-24 VITALS
HEART RATE: 70 BPM | HEIGHT: 74 IN | OXYGEN SATURATION: 97 % | SYSTOLIC BLOOD PRESSURE: 174 MMHG | DIASTOLIC BLOOD PRESSURE: 108 MMHG | RESPIRATION RATE: 14 BRPM | BODY MASS INDEX: 18.99 KG/M2 | WEIGHT: 148 LBS | TEMPERATURE: 98 F

## 2024-04-24 DIAGNOSIS — R05.9 COUGH, UNSPECIFIED TYPE: ICD-10-CM

## 2024-04-24 DIAGNOSIS — J86.0: Primary | ICD-10-CM

## 2024-04-24 LAB
ALBUMIN SERPL-MCNC: 3.1 G/DL (ref 3.4–5)
ALBUMIN/GLOB SERPL: 0.6 {RATIO} (ref 1–2)
ALP LIVER SERPL-CCNC: 110 U/L
ALT SERPL-CCNC: 25 U/L
ANION GAP SERPL CALC-SCNC: 6 MMOL/L (ref 0–18)
AST SERPL-CCNC: 27 U/L (ref 15–37)
ATRIAL RATE: 88 BPM
BASOPHILS # BLD AUTO: 0.08 X10(3) UL (ref 0–0.2)
BASOPHILS NFR BLD AUTO: 0.6 %
BILIRUB SERPL-MCNC: 0.3 MG/DL (ref 0.1–2)
BUN BLD-MCNC: 14 MG/DL (ref 9–23)
CALCIUM BLD-MCNC: 9.4 MG/DL (ref 8.5–10.1)
CHLORIDE SERPL-SCNC: 104 MMOL/L (ref 98–112)
CO2 SERPL-SCNC: 26 MMOL/L (ref 21–32)
CREAT BLD-MCNC: 0.63 MG/DL
EGFRCR SERPLBLD CKD-EPI 2021: 113 ML/MIN/1.73M2 (ref 60–?)
EOSINOPHIL # BLD AUTO: 0.03 X10(3) UL (ref 0–0.7)
EOSINOPHIL NFR BLD AUTO: 0.2 %
ERYTHROCYTE [DISTWIDTH] IN BLOOD BY AUTOMATED COUNT: 16 %
GLOBULIN PLAS-MCNC: 4.8 G/DL (ref 2.8–4.4)
GLUCOSE BLD-MCNC: 137 MG/DL (ref 70–99)
HCT VFR BLD AUTO: 40 %
HGB BLD-MCNC: 12.6 G/DL
IMM GRANULOCYTES # BLD AUTO: 0.14 X10(3) UL (ref 0–1)
IMM GRANULOCYTES NFR BLD: 1.1 %
LYMPHOCYTES # BLD AUTO: 1.3 X10(3) UL (ref 1–4)
LYMPHOCYTES NFR BLD AUTO: 10.4 %
MCH RBC QN AUTO: 29.4 PG (ref 26–34)
MCHC RBC AUTO-ENTMCNC: 31.5 G/DL (ref 31–37)
MCV RBC AUTO: 93.2 FL
MONOCYTES # BLD AUTO: 0.6 X10(3) UL (ref 0.1–1)
MONOCYTES NFR BLD AUTO: 4.8 %
NEUTROPHILS # BLD AUTO: 10.41 X10 (3) UL (ref 1.5–7.7)
NEUTROPHILS # BLD AUTO: 10.41 X10(3) UL (ref 1.5–7.7)
NEUTROPHILS NFR BLD AUTO: 82.9 %
OSMOLALITY SERPL CALC.SUM OF ELEC: 285 MOSM/KG (ref 275–295)
P AXIS: 73 DEGREES
P-R INTERVAL: 136 MS
PLATELET # BLD AUTO: 225 10(3)UL (ref 150–450)
POTASSIUM SERPL-SCNC: 4.1 MMOL/L (ref 3.5–5.1)
PROT SERPL-MCNC: 7.9 G/DL (ref 6.4–8.2)
Q-T INTERVAL: 348 MS
QRS DURATION: 84 MS
QTC CALCULATION (BEZET): 421 MS
R AXIS: 59 DEGREES
RBC # BLD AUTO: 4.29 X10(6)UL
SODIUM SERPL-SCNC: 136 MMOL/L (ref 136–145)
T AXIS: 60 DEGREES
TROPONIN I SERPL HS-MCNC: 7 NG/L
VENTRICULAR RATE: 88 BPM
WBC # BLD AUTO: 12.6 X10(3) UL (ref 4–11)

## 2024-04-24 PROCEDURE — 84484 ASSAY OF TROPONIN QUANT: CPT | Performed by: EMERGENCY MEDICINE

## 2024-04-24 PROCEDURE — 96376 TX/PRO/DX INJ SAME DRUG ADON: CPT

## 2024-04-24 PROCEDURE — 99284 EMERGENCY DEPT VISIT MOD MDM: CPT

## 2024-04-24 PROCEDURE — 85025 COMPLETE CBC W/AUTO DIFF WBC: CPT | Performed by: EMERGENCY MEDICINE

## 2024-04-24 PROCEDURE — 96374 THER/PROPH/DIAG INJ IV PUSH: CPT

## 2024-04-24 PROCEDURE — 80053 COMPREHEN METABOLIC PANEL: CPT | Performed by: EMERGENCY MEDICINE

## 2024-04-24 PROCEDURE — 99285 EMERGENCY DEPT VISIT HI MDM: CPT

## 2024-04-24 PROCEDURE — 71045 X-RAY EXAM CHEST 1 VIEW: CPT | Performed by: EMERGENCY MEDICINE

## 2024-04-24 PROCEDURE — 93005 ELECTROCARDIOGRAM TRACING: CPT

## 2024-04-24 PROCEDURE — 99211 OFF/OP EST MAY X REQ PHY/QHP: CPT

## 2024-04-24 PROCEDURE — 93010 ELECTROCARDIOGRAM REPORT: CPT

## 2024-04-24 RX ORDER — HYDROMORPHONE HYDROCHLORIDE 1 MG/ML
1 INJECTION, SOLUTION INTRAMUSCULAR; INTRAVENOUS; SUBCUTANEOUS ONCE
Status: COMPLETED | OUTPATIENT
Start: 2024-04-24 | End: 2024-04-24

## 2024-04-24 NOTE — DISCHARGE INSTRUCTIONS
Continue taking your antibiotics as prescribed.  You will unfortunately continue to have coughing until your surgical procedure.  Return to the ER for any fevers.

## 2024-04-24 NOTE — ED INITIAL ASSESSMENT (HPI)
PT PRESENTS TO ED WITH COUGHING, GAGGING, HAS ESOPHAGEAL FISTULA AND SCHEDULED FOR SURGERY NEXT WEEK.  PT WAS ALSO RECENTLY DX WITH PNEUMONIA, FEELS LIKE ELEPHANT IS SITTING ON CHEST.

## 2024-04-24 NOTE — PROGRESS NOTES
Thoracic Surgery Progress Note     Name: Dontae Cortez Jr.   Age: 53 year old   Sex: male.   MRN: [id number]    Reason for Consultation: bronchoesophageal fistula     Subjective:     Chief Complaint: \"I'm here to discuss surgery\"     History of Present Illness:   Mr. 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 Patient has undergone multiple esophageal stent placements and EGDs with fistula closure with his last being on 4/9/24 with J tube placement. He reports persistent cough and has been on antibiotics for pneumonia. He was in the ER this morning for chest pressure and shortness of breath. These symptoms are stable and have not worsened. CXR was negative and cardiac workup was negative. His pain improved with IV dilaudid. No fevers at home. Tolerating tube feeds. Has remained NPO. He is here to discuss his upcoming surgery at [facility] on 4/30.     Review Of Systems:   10 point review of systems was conducted and was negative except for the pertinent positives listed in the above HPI.    Past Medical History:   Past Medical History:    Back problem    Belching    Black stools    Borderline diabetes    Dx in 8/2013 - HgA1C 6.2%    C. difficile diarrhea    pt treated and without symptoms    Chest pain    Coronary artery disease    On 8/16/13: CABG x 4 with LIMA to LAD and SVG to diagonal, OM and PDA    Decorative tattoo    Depression    Difficult intubation    h/o esophagectomy for CA; developed esophagobronchial vistula    Disorder of liver    LIVER CA    Esophageal cancer (HCC)    completed chemo    Esophageal reflux    Essential hypertension    Exposure to medical diagnostic radiation    last tx 8/18/2022    Frequent urination    Gastroparesis    Heartburn    High blood pressure    High cholesterol    Found when I had quadruple bypass    History of COVID-19    asymptomatic - pt was dx during a  hospitalization for another diagnosis. No continued symptoms    History of stomach ulcers    Hyperlipidemia    Hyperlipidemia LDL goal < 70    Indigestion    Morbid obesity with BMI of 40.0-44.9, adult (HCC)    Muscle weakness    Nausea vomiting and diarrhea    Nontoxic multinodular goiter    Dx in 8/2013: pt was told that imaging showed thyroid cysts per PCP    Pancreatic cancer (HCC)    last dose 12/4/2023 is scheduled for another round 12/27/23    Peripheral vascular disease (HCC)    pt denies    Personal history of antineoplastic chemotherapy    for esophageal cancer/completed    Personal history of antineoplastic chemotherapy    pancreatic cancer    Personal history of antineoplastic chemotherapy    Last treatment 3/12/24    Problems with swallowing    Pulmonary nodules    Dx in 8/2013: CT chest showed small bilateral fissural-based lung nodules less than 1 cm    S/P CABG x 4    On 8/16/13: CABG x 4 with LIMA to LAD and SVG to diagonal, OM and PDA    Shortness of breath    when coughing; no oxygen    Vomiting       Past Surgical History:   Past Surgical History:   Procedure Laterality Date    Appendectomy      Appendectomy      Arthroscopy of joint unlisted      right shoulder    Cabg      On 8/16/13: CABG x 4 with LIMA to LAD and SVG to diagonal, OM and PDA    Cardiac cath lab      On 8/14/2013: cardiac cath showed 3-vessel disease    Other surgical history      1.       Laparoscopic robotic-assisted esophagogastrectomy.    Port, indwelling, imp         Social History:   Social History     Socioeconomic History    Marital status:      Spouse name: Not on file    Number of children: 3    Years of education: Not on file    Highest education level: Not on file   Occupational History    Occupation: works as  - on workman's comp   Tobacco Use    Smoking status: Former     Current packs/day: 0.00     Average packs/day: 1 pack/day for 27.0 years (27.0 ttl pk-yrs)     Types: Cigarettes     Start  date: 8/15/1986     Quit date: 8/15/2013     Years since quitting: 10.6    Smokeless tobacco: Never    Tobacco comments:     Quit smoking 2013   Vaping Use    Vaping status: Never Used   Substance and Sexual Activity    Alcohol use: Not Currently    Drug use: Never    Sexual activity: Not Currently     Partners: Female   Other Topics Concern     Service Not Asked    Blood Transfusions Not Asked    Caffeine Concern Yes    Occupational Exposure Not Asked    Hobby Hazards Not Asked    Sleep Concern Not Asked    Stress Concern No    Weight Concern No    Special Diet No    Back Care Not Asked    Exercise No    Bike Helmet Not Asked    Seat Belt No    Self-Exams Not Asked   Social History Narrative    Lives with life partner    Has 3 daughters - 1 lives closeby, 1 in [place], 1 in [place]     Social Determinants of Health     Financial Resource Strain: Low Risk  (12/14/2023)    Financial Resource Strain     Difficulty of Paying Living Expenses: Not very hard     Med Affordability: No   Food Insecurity: No Food Insecurity (4/16/2024)    Food Insecurity     Food Insecurity: Never true   Transportation Needs: No Transportation Needs (4/16/2024)    Transportation Needs     Lack of Transportation: No   Physical Activity: Not on file   Stress: Not on file   Social Connections: Not on file   Housing Stability: Low Risk  (4/16/2024)    Housing Stability     Housing Instability: No     Housing Instability Emergency: No       Family History:   Family History   Problem Relation Age of Onset    Cancer Mother         breast and colon     Diabetes Neg        Allergies:  No Known Allergies      Objective:      Vital Signs:  BP (!) 168/112 (BP Location: Left arm, Patient Position: Sitting, Cuff Size: adult)   Pulse 97   Temp 97 °F (36.1 °C) (Temporal)   Resp 20   Wt 67.2 kg (148 lb 3.2 oz)   SpO2 95%   BMI 19.03 kg/m²     Physical Exam:  General: thin, well appearing male in no acute distress  HEENT: Normocephalic, PERRL, EOMI, no  scleral icterus  Neck: Supple, trachea midline, no JVD, no masses. Thyroid not grossly enlarged  Nodes: no cervical or supraclavicular lymphadenopathy appreciated  Heart: regular rate and rhythm.   Lungs: Normal respiratory effort. Room air.   Abdomen: Soft, Non-tender, non-distended. J tube in place.   Extremities: No clubbing or cyanosis. No lateralizing weakness  Neuro: No gross cranial nerve defects, no loss of sensation  Psych:  oriented to person place and time, normal mood and affect      Labs:   Lab Results   Component Value Date/Time    WBC 12.6 (H) 04/24/2024 07:32 AM    HGB 12.6 (L) 04/24/2024 07:32 AM    HCT 40.0 04/24/2024 07:32 AM    .0 04/24/2024 07:32 AM    BAND 2 04/20/2024 05:05 AM    MCV 93.2 04/24/2024 07:32 AM     Lab Results   Component Value Date/Time     04/24/2024 07:32 AM    K 4.1 04/24/2024 07:32 AM     04/24/2024 07:32 AM    CO2 26.0 04/24/2024 07:32 AM    BUN 14 04/24/2024 07:32 AM     (H) 04/24/2024 07:32 AM    CA 9.4 04/24/2024 07:32 AM    MG 1.9 03/15/2024 06:43 AM    PHOS 2.9 03/15/2024 06:43 AM     Lab Results   Component Value Date/Time    INR 1.13 04/16/2024 04:20 AM    PT 20.4 (H) 01/30/2014 02:12 PM    PTT 32.5 04/16/2024 04:20 AM     No components found for: \"TROPI\"  Lab Results   Component Value Date/Time    ALB 3.1 (L) 04/24/2024 07:32 AM    TP 7.9 04/24/2024 07:32 AM    ALT 25 04/24/2024 07:32 AM    AST 27 04/24/2024 07:32 AM    SARA 31 10/16/2022 06:37 AM         Review of Data:   CXR 4/24/24  FINDINGS:  CT-compatible left chest port tip at the cavoatrial junction.  Esophageal stent noted.  Small right pleural effusion noted.  Normal heart size and pulmonary vascularity.  No pneumothorax.  Nonspecific gaseous distention of the bowel in the  upper abdomen.  Minimal atelectasis/scarring in the lower lungs.                   Impression   CONCLUSION:  Small right pleural effusion.  No lobar pneumonia or overt congestive failure.  Minimal  atelectasis/scarring in the lower lungs.     CT chest 4/16/24:   FINDINGS:    LUNGS:  There are secretions present within the left mainstem bronchi and distal right lower lobe airways.  Motion artifact limits assessment of the lungs.  Numerous nodular airspace opacities are present within the lung bases.  These are similar to the  prior exam.  A representative left lower lobe nodule measures up to 9 x 10 mm.  There are numerous other areas present with slight interval progression of airspace opacity of the right upper lobe along the right minor fissure and posteriorly.  These are  favored to represent areas of pneumonia.    VASCULATURE:  There is no pulmonary embolism to the segmental arterial level.  AIDAN:  No mass or adenopathy.    MEDIASTINUM:  There is an esophageal stent present.  CARDIAC:  No enlargement, pericardial thickening, or significant coronary artery calcification.  PLEURA:  No mass or effusion.    THORACIC AORTA:  No aneurysm.    CHEST WALL:  No mass or axillary adenopathy.    LIMITED ABDOMEN:  Limited images of the upper abdomen are unremarkable.    BONES:  No bony lesion or fracture.                     Impression   CONCLUSION:  There is no pulmonary embolism to the segmental arterial level.     Numerous patchy airspace opacities are present within the lungs.  These appear slightly progressed from prior exams, particularly within the right upper lobe.  Multifocal pneumonia is favored.  Secretions are present within airways with areas of mucous  plugging likely present.  Aspiration is favored.     Assessment/Plan:     Mr. 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 Patient has undergone multiple esophageal stent placements and EGDs with fistula closure with his last being on 4/9/24 with J tube placement. He is here to discuss his upcoming surgery at [facility] on 4/30. Described thoracotomy with  bronchoesophageal fistula repair using muscle flap created by plastic surgery. Discussed the risks and benefits of surgery. Patient expressed understanding and would like to proceed with surgery as scheduled.     -NPO  -Continue J tube feedings   -Plan to proceed with thoracotomy with bronchoesophageal fistula repair on 4/30/24 at [facility] with Dr. Koroma.     Yohana Jeffrey PA-C  Thoracic Surgery    Patient seen and examined. I agree with above assessment and plan.    I had a long conversation about surgical plans for Mr. Cortez.  I went over in detail the stent removal, thoracotomy, muscle flap creation dissection and placement of the flap bewtween the 2 sides of the fistula    Sudarshan Koroma MD  Thoracic Surgery  Pager [phone]

## 2024-04-24 NOTE — ED PROVIDER NOTES
Patient Seen in: [facility] Emergency Department      History     Chief Complaint   Patient presents with    Difficulty Breathing     SOB since discharged on Saturday, \"feels like somebody's jumping on my chest\" now vomiting at TL desk     Stated Complaint: SOB \"somebody's jumping on my lungs\"    Subjective:   HPI    Patient is here for persistent symptoms have been going on for some time now.  He has a known tracheoesophageal fistula, he is scheduled for surgical intervention next week at [facility].  He was admitted recently for shortness of breath and cough, was diagnosed with pneumonia and sent home on antibiotics.  He is here for persistent symptoms.  No fevers, had some subjective chills.    The main concern for him is that he has an ongoing cough and reflux issues.    Symptoms are not any worse or different than they have been over the last couple weeks.    Due to see Dr. Meza from thoracic surgery in a couple hours.    Still on Augmentin and vancomycin through G-tube      Objective:   Past Medical History:    Back problem    Belching    Black stools    Borderline diabetes    Dx in 8/2013 - HgA1C 6.2%    C. difficile diarrhea    pt treated and without symptoms    Chest pain    Coronary artery disease    On 8/16/13: CABG x 4 with LIMA to LAD and SVG to diagonal, OM and PDA    Decorative tattoo    Depression    Difficult intubation    h/o esophagectomy for CA; developed esophagobronchial vistula    Disorder of liver    LIVER CA    Esophageal cancer (HCC)    completed chemo    Esophageal reflux    Essential hypertension    Exposure to medical diagnostic radiation    last tx 8/18/2022    Frequent urination    Gastroparesis    Heartburn    High blood pressure    High cholesterol    Found when I had quadruple bypass    History of COVID-19    asymptomatic - pt was dx during a hospitalization for another diagnosis. No continued symptoms    History of stomach ulcers    Hyperlipidemia    Hyperlipidemia LDL goal <  70    Indigestion    Morbid obesity with BMI of 40.0-44.9, adult (HCC)    Muscle weakness    Nausea vomiting and diarrhea    Nontoxic multinodular goiter    Dx in 8/2013: pt was told that imaging showed thyroid cysts per PCP    Pancreatic cancer (HCC)    last dose 12/4/2023 is scheduled for another round 12/27/23    Peripheral vascular disease (HCC)    pt denies    Personal history of antineoplastic chemotherapy    for esophageal cancer/completed    Personal history of antineoplastic chemotherapy    pancreatic cancer    Personal history of antineoplastic chemotherapy    Last treatment 3/12/24    Problems with swallowing    Pulmonary nodules    Dx in 8/2013: CT chest showed small bilateral fissural-based lung nodules less than 1 cm    S/P CABG x 4    On 8/16/13: CABG x 4 with LIMA to LAD and SVG to diagonal, OM and PDA    Shortness of breath    when coughing; no oxygen    Vomiting              Past Surgical History:   Procedure Laterality Date    Appendectomy      Appendectomy      Arthroscopy of joint unlisted      right shoulder    Cabg      On 8/16/13: CABG x 4 with LIMA to LAD and SVG to diagonal, OM and PDA    Cardiac cath lab      On 8/14/2013: cardiac cath showed 3-vessel disease    Other surgical history      1.       Laparoscopic robotic-assisted esophagogastrectomy.    Port, indwelling, imp                  Social History     Socioeconomic History    Marital status:     Number of children: 3   Occupational History    Occupation: works as  - on workman's comp   Tobacco Use    Smoking status: Former     Current packs/day: 0.00     Average packs/day: 1 pack/day for 27.0 years (27.0 ttl pk-yrs)     Types: Cigarettes     Start date: 8/15/1986     Quit date: 8/15/2013     Years since quitting: 10.6    Smokeless tobacco: Never    Tobacco comments:     Quit smoking 2013   Vaping Use    Vaping status: Never Used   Substance and Sexual Activity    Alcohol use: Not Currently    Drug use: Never     Sexual activity: Not Currently     Partners: Female   Other Topics Concern    Caffeine Concern Yes    Stress Concern No    Weight Concern No    Special Diet No    Exercise No    Seat Belt No   Social History Narrative    Lives with life partner    Has 3 daughters - 1 lives closeby, 1 in [place], 1 in [place]     Social Determinants of Health     Financial Resource Strain: Low Risk  (12/14/2023)    Financial Resource Strain     Difficulty of Paying Living Expenses: Not very hard     Med Affordability: No   Food Insecurity: No Food Insecurity (4/16/2024)    Food Insecurity     Food Insecurity: Never true   Transportation Needs: No Transportation Needs (4/16/2024)    Transportation Needs     Lack of Transportation: No   Housing Stability: Low Risk  (4/16/2024)    Housing Stability     Housing Instability: No     Housing Instability Emergency: No              Review of Systems    Positive for stated complaint: SOB \"somebody's jumping on my lungs\"  Other systems are as noted in HPI.  Constitutional and vital signs reviewed.      All other systems reviewed and negative except as noted above.    Physical Exam     ED Triage Vitals [04/24/24 0712]   BP (!) 178/115   Pulse 112   Resp 19   Temp 97.5 °F (36.4 °C)   Temp src Temporal   SpO2 95 %   O2 Device None (Room air)       Current:BP (!) 174/108   Pulse 70   Temp 97.5 °F (36.4 °C) (Temporal)   Resp 14   Ht 188 cm (6' 2\")   Wt 67.1 kg   SpO2 97%   BMI 19.00 kg/m²         Physical Exam    physical Exam   Constitutional: Awake, alert, well appearing  Head: Normocephalic and atraumatic.   Eyes: Conjunctivae are normal. Pupils are equal, round, and reactive to light.   Neck: Normal range of motion. Neck supple.   Cardiovascular: Normal rate, regular rhythm  Pulmonary/Chest: Overall respiratory effort is normal, great air exchange, does have some right middle lobe crackles.  Abdominal: Soft. Bowel sounds are normal.   Neurological: Pt is alert and oriented to person, place, and  time. no cranial nerve deficits  Skin: Skin is warm and dry.    ED Course     Labs Reviewed   COMP METABOLIC PANEL (14) - Abnormal; Notable for the following components:       Result Value    Glucose 137 (*)     Creatinine 0.63 (*)     Albumin 3.1 (*)     Globulin  4.8 (*)     A/G Ratio 0.6 (*)     All other components within normal limits   CBC W/ DIFFERENTIAL - Abnormal; Notable for the following components:    WBC 12.6 (*)     RBC 4.29 (*)     HGB 12.6 (*)     Neutrophil Absolute Prelim 10.41 (*)     Neutrophil Absolute 10.41 (*)     All other components within normal limits   TROPONIN I HIGH SENSITIVITY - Normal   CBC WITH DIFFERENTIAL WITH PLATELET    Narrative:     The following orders were created for panel order CBC With Differential With Platelet.  Procedure                               Abnormality         Status                     ---------                               -----------         ------                     CBC W/ DIFFERENTIAL[[id number]]          Abnormal            Final result                 Please view results for these tests on the individual orders.   RAINBOW DRAW LAVENDER   RAINBOW DRAW LIGHT GREEN   RAINBOW DRAW BLUE     EKG    Rate, intervals and axes as noted on EKG Report.  Rate: 88  Rhythm: Sinus Rhythm  Reading: Sinus rhythm no acute ischemia                 Labs including CBC CMP troponin are fine    Patient was ambulated through the ER and did not desaturate, there is no respiratory distress noted.    XR CHEST AP PORTABLE  (CPT=71045)    Result Date: 4/24/2024  CONCLUSION:  Small right pleural effusion.  No lobar pneumonia or overt congestive failure.  Minimal atelectasis/scarring in the lower lungs.   LOCATION:  [id number]      Dictated by (CST): Immanuel Matthews MD on 4/24/2024 at 8:11 AM     Finalized by (CST): Richie Jay MD on 4/24/2024 at 8:15 AM          I briefly discussed with Dr. Espinosa's PA, no objection to discharge, okay to keep appointment later this morning.         MDM           Differential diagnoses considered: Pneumonia, pneumothorax, reflux, known tracheoesophageal fistula.  -Acceptable vital signs  -Patient reports he feels miserable but this is chronically so.  Unfortunately I do not think we can anticipate much improvement in his symptoms until his surgery.  Discussed briefly with thoracic surgery as noted above, okay for discharge home, will be seen in clinic later today.      I visualized the radiology studies, my independent interpretation: No new pneumonia noted on chest x-ray    *Discussion of ongoing management of this patient's care included: Thoracic surgery PA as noted above  *Comorbidities contributing to the complexity of decision making: n/a  *External charts reviewed: Notes from recent admission reviewed.  There was some question about bacteremia but ultimately was thought that the sample was contaminated.  He was discharged home on Augmentin and vancomycin.  *Additional sources of history: n/a    Shared decision making was done by: patient, myself.                                     Medical Decision Making      Disposition and Plan     Clinical Impression:  1. Esophago-tracheal fistula (HCC)    2. Cough, unspecified type         Disposition:  Discharge  4/24/2024  9:13 am    Follow-up:  Ga Champion, Sudarshan BRAND MD  [address] [facility] [address]  [phone]    Follow up            Medications Prescribed:  Discharge Medication List as of 4/24/2024  9:41 AM

## 2024-04-24 NOTE — PROGRESS NOTES
Thoracic Surgery Progress Note     Name: Dontae Cortez Jr.   Age: 53 year old   Sex: male.   MRN: [id number]    Reason for Consultation: bronchoesophageal fistula     Subjective:     Chief Complaint: \"I'm here to discuss surgery\"     History of Present Illness:   Mr. 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 Patient has undergone multiple esophageal stent placements and EGDs with fistula closure with his last being on 4/9/24 with J tube placement. He reports persistent cough and has been on antibiotics for pneumonia. He was in the ER this morning for chest pressure and shortness of breath. These symptoms are stable and have not worsened. CXR was negative and cardiac workup was negative. His pain improved with IV dilaudid. No fevers at home. Tolerating tube feeds. Has remained NPO. He is here to discuss his upcoming surgery at [place] on 4/30.     Review Of Systems:   10 point review of systems was conducted and was negative except for the pertinent positives listed in the above HPI.    Past Medical History:   Past Medical History:    Back problem    Belching    Black stools    Borderline diabetes    Dx in 8/2013 - HgA1C 6.2%    C. difficile diarrhea    pt treated and without symptoms    Chest pain    Coronary artery disease    On 8/16/13: CABG x 4 with LIMA to LAD and SVG to diagonal, OM and PDA    Decorative tattoo    Depression    Difficult intubation    h/o esophagectomy for CA; developed esophagobronchial vistula    Disorder of liver    LIVER CA    Esophageal cancer (HCC)    completed chemo    Esophageal reflux    Essential hypertension    Exposure to medical diagnostic radiation    last tx 8/18/2022    Frequent urination    Gastroparesis    Heartburn    High blood pressure    High cholesterol    Found when I had quadruple bypass    History of COVID-19    asymptomatic - pt was dx during a  hospitalization for another diagnosis. No continued symptoms    History of stomach ulcers    Hyperlipidemia    Hyperlipidemia LDL goal < 70    Indigestion    Morbid obesity with BMI of 40.0-44.9, adult (HCC)    Muscle weakness    Nausea vomiting and diarrhea    Nontoxic multinodular goiter    Dx in 8/2013: pt was told that imaging showed thyroid cysts per PCP    Pancreatic cancer (HCC)    last dose 12/4/2023 is scheduled for another round 12/27/23    Peripheral vascular disease (HCC)    pt denies    Personal history of antineoplastic chemotherapy    for esophageal cancer/completed    Personal history of antineoplastic chemotherapy    pancreatic cancer    Personal history of antineoplastic chemotherapy    Last treatment 3/12/24    Problems with swallowing    Pulmonary nodules    Dx in 8/2013: CT chest showed small bilateral fissural-based lung nodules less than 1 cm    S/P CABG x 4    On 8/16/13: CABG x 4 with LIMA to LAD and SVG to diagonal, OM and PDA    Shortness of breath    when coughing; no oxygen    Vomiting       Past Surgical History:   Past Surgical History:   Procedure Laterality Date    Appendectomy      Appendectomy      Arthroscopy of joint unlisted      right shoulder    Cabg      On 8/16/13: CABG x 4 with LIMA to LAD and SVG to diagonal, OM and PDA    Cardiac cath lab      On 8/14/2013: cardiac cath showed 3-vessel disease    Other surgical history      1.       Laparoscopic robotic-assisted esophagogastrectomy.    Port, indwelling, imp         Social History:   Social History     Socioeconomic History    Marital status:      Spouse name: Not on file    Number of children: 3    Years of education: Not on file    Highest education level: Not on file   Occupational History    Occupation: works as  - on workman's comp   Tobacco Use    Smoking status: Former     Current packs/day: 0.00     Average packs/day: 1 pack/day for 27.0 years (27.0 ttl pk-yrs)     Types: Cigarettes     Start  date: 8/15/1986     Quit date: 8/15/2013     Years since quitting: 10.6    Smokeless tobacco: Never    Tobacco comments:     Quit smoking 2013   Vaping Use    Vaping status: Never Used   Substance and Sexual Activity    Alcohol use: Not Currently    Drug use: Never    Sexual activity: Not Currently     Partners: Female   Other Topics Concern     Service Not Asked    Blood Transfusions Not Asked    Caffeine Concern Yes    Occupational Exposure Not Asked    Hobby Hazards Not Asked    Sleep Concern Not Asked    Stress Concern No    Weight Concern No    Special Diet No    Back Care Not Asked    Exercise No    Bike Helmet Not Asked    Seat Belt No    Self-Exams Not Asked   Social History Narrative    Lives with life partner    Has 3 daughters - 1 lives closeby, 1 in [place], 1 in [place]     Social Determinants of Health     Financial Resource Strain: Low Risk  (12/14/2023)    Financial Resource Strain     Difficulty of Paying Living Expenses: Not very hard     Med Affordability: No   Food Insecurity: No Food Insecurity (4/16/2024)    Food Insecurity     Food Insecurity: Never true   Transportation Needs: No Transportation Needs (4/16/2024)    Transportation Needs     Lack of Transportation: No   Physical Activity: Not on file   Stress: Not on file   Social Connections: Not on file   Housing Stability: Low Risk  (4/16/2024)    Housing Stability     Housing Instability: No     Housing Instability Emergency: No       Family History:   Family History   Problem Relation Age of Onset    Cancer Mother         breast and colon     Diabetes Neg        Allergies:  No Known Allergies      Objective:      Vital Signs:  BP (!) 168/112 (BP Location: Left arm, Patient Position: Sitting, Cuff Size: adult)   Pulse 97   Temp 97 °F (36.1 °C) (Temporal)   Resp 20   Wt 67.2 kg (148 lb 3.2 oz)   SpO2 95%   BMI 19.03 kg/m²     Physical Exam:  General: thin, well appearing male in no acute distress  HEENT: Normocephalic, PERRL, EOMI, no  scleral icterus  Neck: Supple, trachea midline, no JVD, no masses. Thyroid not grossly enlarged  Nodes: no cervical or supraclavicular lymphadenopathy appreciated  Heart: regular rate and rhythm.   Lungs: Normal respiratory effort. Room air.   Abdomen: Soft, Non-tender, non-distended. J tube in place.   Extremities: No clubbing or cyanosis. No lateralizing weakness  Neuro: No gross cranial nerve defects, no loss of sensation  Psych:  oriented to person place and time, normal mood and affect      Labs:   Lab Results   Component Value Date/Time    WBC 12.6 (H) 04/24/2024 07:32 AM    HGB 12.6 (L) 04/24/2024 07:32 AM    HCT 40.0 04/24/2024 07:32 AM    .0 04/24/2024 07:32 AM    BAND 2 04/20/2024 05:05 AM    MCV 93.2 04/24/2024 07:32 AM     Lab Results   Component Value Date/Time     04/24/2024 07:32 AM    K 4.1 04/24/2024 07:32 AM     04/24/2024 07:32 AM    CO2 26.0 04/24/2024 07:32 AM    BUN 14 04/24/2024 07:32 AM     (H) 04/24/2024 07:32 AM    CA 9.4 04/24/2024 07:32 AM    MG 1.9 03/15/2024 06:43 AM    PHOS 2.9 03/15/2024 06:43 AM     Lab Results   Component Value Date/Time    INR 1.13 04/16/2024 04:20 AM    PT 20.4 (H) 01/30/2014 02:12 PM    PTT 32.5 04/16/2024 04:20 AM     No components found for: \"TROPI\"  Lab Results   Component Value Date/Time    ALB 3.1 (L) 04/24/2024 07:32 AM    TP 7.9 04/24/2024 07:32 AM    ALT 25 04/24/2024 07:32 AM    AST 27 04/24/2024 07:32 AM    SARA 31 10/16/2022 06:37 AM         Review of Data:   CXR 4/24/24  FINDINGS:  CT-compatible left chest port tip at the cavoatrial junction.  Esophageal stent noted.  Small right pleural effusion noted.  Normal heart size and pulmonary vascularity.  No pneumothorax.  Nonspecific gaseous distention of the bowel in the  upper abdomen.  Minimal atelectasis/scarring in the lower lungs.                   Impression   CONCLUSION:  Small right pleural effusion.  No lobar pneumonia or overt congestive failure.  Minimal  atelectasis/scarring in the lower lungs.     CT chest 4/16/24:   FINDINGS:    LUNGS:  There are secretions present within the left mainstem bronchi and distal right lower lobe airways.  Motion artifact limits assessment of the lungs.  Numerous nodular airspace opacities are present within the lung bases.  These are similar to the  prior exam.  A representative left lower lobe nodule measures up to 9 x 10 mm.  There are numerous other areas present with slight interval progression of airspace opacity of the right upper lobe along the right minor fissure and posteriorly.  These are  favored to represent areas of pneumonia.    VASCULATURE:  There is no pulmonary embolism to the segmental arterial level.  AIDAN:  No mass or adenopathy.    MEDIASTINUM:  There is an esophageal stent present.  CARDIAC:  No enlargement, pericardial thickening, or significant coronary artery calcification.  PLEURA:  No mass or effusion.    THORACIC AORTA:  No aneurysm.    CHEST WALL:  No mass or axillary adenopathy.    LIMITED ABDOMEN:  Limited images of the upper abdomen are unremarkable.    BONES:  No bony lesion or fracture.                     Impression   CONCLUSION:  There is no pulmonary embolism to the segmental arterial level.     Numerous patchy airspace opacities are present within the lungs.  These appear slightly progressed from prior exams, particularly within the right upper lobe.  Multifocal pneumonia is favored.  Secretions are present within airways with areas of mucous  plugging likely present.  Aspiration is favored.     Assessment/Plan:     Mr. 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 Patient has undergone multiple esophageal stent placements and EGDs with fistula closure with his last being on 4/9/24 with J tube placement. He is here to discuss his upcoming surgery at [facility] on 4/30. Described thoracotomy with  bronchoesophageal fistula repair using muscle flap created by plastic surgery. Discussed the risks and benefits of surgery. Patient expressed understanding and would like to proceed with surgery as scheduled.     -NPO  -Continue J tube feedings   -Plan to proceed with thoracotomy with bronchoesophageal fistula repair on 4/30/24 at [facility] with Dr. Koroma.     OBI JesusC  Thoracic Surgery

## 2024-05-06 ENCOUNTER — TELEPHONE (OUTPATIENT)
Dept: HEMATOLOGY/ONCOLOGY | Facility: HOSPITAL | Age: 54
End: 2024-05-06

## 2024-05-06 NOTE — TELEPHONE ENCOUNTER
ONCOLOGY NUTRITION PHONE CONSULTATION: ANNETTE      4/30/24: Latissimus dorsi flap reconstruction for bronchoesophageal fistula at [facility] inpt RD alerted this RD that 2/2 pt ongoing c/o diarrhea, J-tube feeds changed from [employer] 1.4 to VITAL 1.5. [facility] made aware as well. [facility] RD also noted pt started on PERT of Relizorb cartridges; pt/spouse ix on use per inpt RD. Pt to have swallow study prior to discharge from [facility]; however, pt will most likely be discharged on liquids only.     This RD verbalized understanding and expressed appreciation for information provided.

## 2024-05-08 ENCOUNTER — APPOINTMENT (OUTPATIENT)
Dept: HEMATOLOGY/ONCOLOGY | Facility: HOSPITAL | Age: 54
End: 2024-05-08
Attending: INTERNAL MEDICINE

## 2024-05-15 ENCOUNTER — OFFICE VISIT (OUTPATIENT)
Dept: HEMATOLOGY/ONCOLOGY | Facility: HOSPITAL | Age: 54
End: 2024-05-15
Attending: INTERNAL MEDICINE
Payer: COMMERCIAL

## 2024-05-15 ENCOUNTER — HOSPITAL ENCOUNTER (OUTPATIENT)
Dept: NUCLEAR MEDICINE | Facility: HOSPITAL | Age: 54
Discharge: HOME | End: 2024-05-15
Attending: NURSE PRACTITIONER

## 2024-05-15 VITALS
TEMPERATURE: 98 F | DIASTOLIC BLOOD PRESSURE: 94 MMHG | WEIGHT: 155 LBS | SYSTOLIC BLOOD PRESSURE: 148 MMHG | HEIGHT: 73.82 IN | RESPIRATION RATE: 18 BRPM | OXYGEN SATURATION: 97 % | BODY MASS INDEX: 19.89 KG/M2 | HEART RATE: 87 BPM

## 2024-05-15 DIAGNOSIS — C78.89 METASTATIC ADENOCARCINOMA TO PANCREAS (HCC): ICD-10-CM

## 2024-05-15 DIAGNOSIS — G89.18 ACUTE POST-OPERATIVE PAIN: ICD-10-CM

## 2024-05-15 DIAGNOSIS — C15.5 MALIGNANT NEOPLASM OF LOWER THIRD OF ESOPHAGUS (HCC): ICD-10-CM

## 2024-05-15 DIAGNOSIS — C78.7 METASTASES TO THE LIVER (HCC): ICD-10-CM

## 2024-05-15 DIAGNOSIS — J86.0: Primary | ICD-10-CM

## 2024-05-15 LAB — GLUCOSE BLD-MCNC: 100 MG/DL (ref 70–99)

## 2024-05-15 PROCEDURE — 82962 GLUCOSE BLOOD TEST: CPT

## 2024-05-15 PROCEDURE — 78815 PET IMAGE W/CT SKULL-THIGH: CPT | Performed by: NURSE PRACTITIONER

## 2024-05-15 RX ORDER — OXYCODONE HCL 5 MG/5 ML
10 SOLUTION, ORAL ORAL EVERY 4 HOURS PRN
Qty: 560 ML | Refills: 0 | Status: SHIPPED | OUTPATIENT
Start: 2024-05-15 | End: 2024-05-15 | Stop reason: CLARIF

## 2024-05-15 RX ORDER — OXYCODONE HYDROCHLORIDE 5 MG/1
5 TABLET ORAL EVERY 4 HOURS PRN
Qty: 40 TABLET | Refills: 0 | Status: SHIPPED | OUTPATIENT
Start: 2024-05-15 | End: 2024-05-24

## 2024-05-15 NOTE — PROGRESS NOTES
Thoracic Surgery Post-Op Progress Note    Mr. Cortez is a 53 year old male who presents today in follow up after an EGD with stent removal and thoracotomy with bronchoesophgeal fistula repair performed on 4/30/24. He is doing well. He complains of incisional pain controlled with liquid oxycodone q6 hours and tylenol. He is still NPO. Coughing much less than he was before surgery. He reports minimal drainage from his chest tube. He denies fevers or chills. No cough. No hemoptysis. No shortness of breath. No worsening pain, swelling, or drainage from wounds. He has been staying active and using incentive spirometry at home.    PMH:  Past Medical History:    Back problem    Belching    Black stools    Borderline diabetes    Dx in 8/2013 - HgA1C 6.2%    C. difficile diarrhea    pt treated and without symptoms    Chest pain    Coronary artery disease    On 8/16/13: CABG x 4 with LIMA to LAD and SVG to diagonal, OM and PDA    Decorative tattoo    Depression    Difficult intubation    h/o esophagectomy for CA; developed esophagobronchial vistula    Disorder of liver    LIVER CA    Esophageal cancer (HCC)    completed chemo    Esophageal reflux    Essential hypertension    Exposure to medical diagnostic radiation    last tx 8/18/2022    Frequent urination    Gastroparesis    Heartburn    High blood pressure    High cholesterol    Found when I had quadruple bypass    History of COVID-19    asymptomatic - pt was dx during a hospitalization for another diagnosis. No continued symptoms    History of stomach ulcers    Hyperlipidemia    Hyperlipidemia LDL goal < 70    Indigestion    Morbid obesity with BMI of 40.0-44.9, adult (HCC)    Muscle weakness    Nausea vomiting and diarrhea    Nontoxic multinodular goiter    Dx in 8/2013: pt was told that imaging showed thyroid cysts per PCP    Pancreatic cancer (HCC)    last dose 12/4/2023 is scheduled for another round 12/27/23    Peripheral vascular disease (HCC)    pt denies     Personal history of antineoplastic chemotherapy    for esophageal cancer/completed    Personal history of antineoplastic chemotherapy    pancreatic cancer    Personal history of antineoplastic chemotherapy    Last treatment 3/12/24    Problems with swallowing    Pulmonary nodules    Dx in 8/2013: CT chest showed small bilateral fissural-based lung nodules less than 1 cm    S/P CABG x 4    On 8/16/13: CABG x 4 with LIMA to LAD and SVG to diagonal, OM and PDA    Shortness of breath    when coughing; no oxygen    Vomiting       Meds:    Current Outpatient Medications:     albuterol (2.5 MG/3ML) 0.083% Inhalation Nebu Soln, Take 3 mL (2.5 mg total) by nebulization 3 (three) times daily., Disp: 90 each, Rfl: 0    HYDROcodone-acetaminophen 5-325 MG Oral Tab, Take 1 tablet by mouth every 4 (four) hours as needed for Pain., Disp: 20 tablet, Rfl: 0    metoprolol tartrate 25 MG Oral Tab, Take 1 tablet (25 mg total) by mouth 2x Daily(Beta Blocker)., Disp: 60 tablet, Rfl: 1    morphINE 10 MG/5ML Oral Solution, 5 mL (10 mg total) by Per G Tube route every 6 (six) hours., Disp: 300 mL, Rfl: 0    baclofen 10 MG Oral Tab, Take 1 tablet (10 mg total) by mouth 3 (three) times daily as needed., Disp: , Rfl:     OLANZapine 5 MG Oral Tab, Take 1 tablet (5 mg total) by mouth nightly., Disp: 30 tablet, Rfl: 3    metoclopramide (REGLAN) 10 MG Oral Tab, Take 1 tablet (10 mg total) by mouth 4 (four) times daily before meals and nightly., Disp: 120 tablet, Rfl: 2    Pancrelipase, Lip-Prot-Amyl, (CREON) 40249-80454 units Oral Cap DR Particles, Take 2 capsules with meals and 1 capsule with snacks, Disp: 240 capsule, Rfl: 0    gabapentin 300 MG Oral Cap, Take 1 capsule (300 mg total) by mouth in the morning and 1 capsule (300 mg total) before bedtime., Disp: 180 capsule, Rfl: 0    sertraline 100 MG Oral Tab, Take 1.5 tablets (150 mg total) by mouth daily., Disp: , Rfl:     losartan 50 MG Oral Tab, Take 1 tablet (50 mg total) by mouth daily.,  Disp: 90 tablet, Rfl: 2    Omeprazole 40 MG Oral Capsule Delayed Release, Take 1 capsule (40 mg total) by mouth 2 (two) times daily before meals., Disp: 90 capsule, Rfl: 3    Vital Signs:    BP (!) 148/94 (BP Location: Left arm, Patient Position: Sitting, Cuff Size: adult)   Pulse 87   Temp 97.7 °F (36.5 °C) (Temporal)   Resp 18   Ht 1.875 m (6' 1.82\")   Wt 70.3 kg (155 lb)   SpO2 97%   BMI 20.00 kg/m²     Physical Exam:    General: well appearing male in no acute distress  HEENT: Normocephalic, PERRL, EOMI  Heart: regular rate and rhythm. No murmurs, rubs or gallops. No lower extremity edema.  Lungs: Normal respiratory effort. Decreased breath sounds over right side.   Chest: wound vac in place. PAUL drain with minimal serosang output.   Chest tube without air leak.   Abdomen: Soft, Non-tender, non-distended. J tube in place.   Extremities: No clubbing or cyanosis. No lateralizing weakness  Neuro: No gross cranial nerve defects, no loss of sensation  Psych: oriented to person place and time, normal mood and affect    Pathology:  FINAL DIAGNOSIS     BONE, RIGHT THIRD RIB, PROCEDURE NOT SPECIFIED:   -FRAGMENTS OF VIABLE BONE   -NO ACUTE INFLAMMATION     Assesment:    Mr. Cortez is a 53 year old male who presents today in follow up after an EGD with stent removal, thoracotomy with bronchoesophgeal fistula repair via latissimus muscle flap performed on April 30th, 2024. He is doing quite well. Chest tube without air leak on exam and was removed in the office. Will advance patient to a soft diet but he should continue tube feeds. Depending on how much nutrition he gets PO, tube feeds may need to be adjusted. Instructed patient to talk to his nutritionist about this. Mr. Cortez's only complaint is continued incisional pain controlled with oxycodone and tylenol. Oxycodone refilled. With regards to his PAUL drains and wound vac, he will need to follow up with Dr. Vargas of plastic surgery surgery at [facility] who  created the muscle flap for us. Our office will help him arrange this. Follow up with me again in 2 weeks.     Plan:  Encouraged continued exercise and incentive spirometer use.  No heavy lifting, pushing or pulling for one month after surgery.   Oxycodone refilled  Advanced to a soft diet. Continue tube feeds per nutrition.   Follow up again in 2 weeks.     Sudarshan Koroma M.D.  Thoracic Surgery  Pager: [phone]

## 2024-05-22 DIAGNOSIS — F33.1 MAJOR DEPRESSIVE DISORDER, RECURRENT, MODERATE (HCC): ICD-10-CM

## 2024-05-22 RX ORDER — SERTRALINE HYDROCHLORIDE 100 MG/1
150 TABLET, FILM COATED ORAL DAILY
Qty: 135 TABLET | Refills: 1 | Status: SHIPPED | OUTPATIENT
Start: 2024-05-22

## 2024-05-24 ENCOUNTER — TELEPHONE (OUTPATIENT)
Dept: HEMATOLOGY/ONCOLOGY | Facility: HOSPITAL | Age: 54
End: 2024-05-24

## 2024-05-24 DIAGNOSIS — J86.0: ICD-10-CM

## 2024-05-24 DIAGNOSIS — G89.18 ACUTE POST-OPERATIVE PAIN: ICD-10-CM

## 2024-05-24 RX ORDER — OXYCODONE HYDROCHLORIDE 5 MG/1
5 TABLET ORAL EVERY 4 HOURS PRN
Qty: 40 TABLET | Refills: 0 | Status: SHIPPED | OUTPATIENT
Start: 2024-05-24 | End: 2024-06-23

## 2024-05-24 NOTE — TELEPHONE ENCOUNTER
Oncology Nutrition PHONE Consultation    Patient Name: Dontae Cortez Jr.  YOB: 1970  Medical Record Number: [id number]   Account Number: [id number]  Dietitian: Kayla Hagen RD, LDN    Date of visit: 5/24/2024    Diet Rx:   Soft as tolerated  6.5 cartons FS Vital 1.5 from [facility] (as per conversation w/ Jessica RD on 5/6/24) via J-tube (non-enfit); PERT of Relizorb cartridges    Pertinent Dx/PMH: Esophageal cancer; pancreatic (head) cancer      Past Medical History:    Back problem    Belching    Black stools    Borderline diabetes    Dx in 8/2013 - HgA1C 6.2%    C. difficile diarrhea    pt treated and without symptoms    Chest pain    Coronary artery disease    On 8/16/13: CABG x 4 with LIMA to LAD and SVG to diagonal, OM and PDA    Decorative tattoo    Depression    Difficult intubation    h/o esophagectomy for CA; developed esophagobronchial vistula    Disorder of liver    LIVER CA    Esophageal cancer (HCC)    completed chemo    Esophageal reflux    Essential hypertension    Exposure to medical diagnostic radiation    last tx 8/18/2022    Frequent urination    Gastroparesis    Heartburn    High blood pressure    High cholesterol    Found when I had quadruple bypass    History of COVID-19    asymptomatic - pt was dx during a hospitalization for another diagnosis. No continued symptoms    History of stomach ulcers    Hyperlipidemia    Hyperlipidemia LDL goal < 70    Indigestion    Morbid obesity with BMI of 40.0-44.9, adult (HCC)    Muscle weakness    Nausea vomiting and diarrhea    Nontoxic multinodular goiter    Dx in 8/2013: pt was told that imaging showed thyroid cysts per PCP    Pancreatic cancer (HCC)    last dose 12/4/2023 is scheduled for another round 12/27/23    Peripheral vascular disease (HCC)    pt denies    Personal history of antineoplastic chemotherapy    for esophageal cancer/completed    Personal history of antineoplastic chemotherapy    pancreatic cancer    Personal  history of antineoplastic chemotherapy    Last treatment 3/12/24    Problems with swallowing    Pulmonary nodules    Dx in 8/2013: CT chest showed small bilateral fissural-based lung nodules less than 1 cm    S/P CABG x 4    On 8/16/13: CABG x 4 with LIMA to LAD and SVG to diagonal, OM and PDA    Shortness of breath    when coughing; no oxygen    Vomiting       TX: PENDING    Other pertinent subjective/objective information: recent diet/sx hx obtained; 5/15/24 PET noted; 5/1/24: Latissimus dorsi flap reconstruction for bronchoesophageal fistula at [place]; other medical hx documented in prior notes     Pertinent Meds:    Current Outpatient Medications:     oxyCODONE 5 MG Oral Tab, Take 1 tablet (5 mg total) by mouth every 4 (four) hours as needed for Pain., Disp: 40 tablet, Rfl: 0    SERTRALINE 100 MG Oral Tab, TAKE 1.5 TABLETS (150MG TOTAL) BY MOUTH DAILY, Disp: 135 tablet, Rfl: 1    albuterol (2.5 MG/3ML) 0.083% Inhalation Nebu Soln, Take 3 mL (2.5 mg total) by nebulization 3 (three) times daily., Disp: 90 each, Rfl: 0    HYDROcodone-acetaminophen 5-325 MG Oral Tab, Take 1 tablet by mouth every 4 (four) hours as needed for Pain., Disp: 20 tablet, Rfl: 0    metoprolol tartrate 25 MG Oral Tab, Take 1 tablet (25 mg total) by mouth 2x Daily(Beta Blocker)., Disp: 60 tablet, Rfl: 1    morphINE 10 MG/5ML Oral Solution, 5 mL (10 mg total) by Per G Tube route every 6 (six) hours., Disp: 300 mL, Rfl: 0    baclofen 10 MG Oral Tab, Take 1 tablet (10 mg total) by mouth 3 (three) times daily as needed., Disp: , Rfl:     OLANZapine 5 MG Oral Tab, Take 1 tablet (5 mg total) by mouth nightly., Disp: 30 tablet, Rfl: 3    metoclopramide (REGLAN) 10 MG Oral Tab, Take 1 tablet (10 mg total) by mouth 4 (four) times daily before meals and nightly., Disp: 120 tablet, Rfl: 2    Pancrelipase, Lip-Prot-Amyl, (CREON) 97525-80969 units Oral Cap DR Particles, Take 2 capsules with meals and 1 capsule with snacks, Disp: 240 capsule, Rfl: 0     gabapentin 300 MG Oral Cap, Take 1 capsule (300 mg total) by mouth in the morning and 1 capsule (300 mg total) before bedtime., Disp: 180 capsule, Rfl: 0    losartan 50 MG Oral Tab, Take 1 tablet (50 mg total) by mouth daily., Disp: 90 tablet, Rfl: 2    Omeprazole 40 MG Oral Capsule Delayed Release, Take 1 capsule (40 mg total) by mouth 2 (two) times daily before meals., Disp: 90 capsule, Rfl: 3    Height:  6'1.82\"         IBW: 190 +/- 10%    WT HX:   05/24/24: ~ 164 lbs per pt    Wt Readings from Last 9 Encounters:   05/15/24 70.3 kg (155 lb)   04/24/24 67.2 kg (148 lb 3.2 oz)   04/24/24 67.1 kg (148 lb)   04/17/24 68.9 kg (152 lb)   04/08/24 68.4 kg (150 lb 14.4 oz)   03/25/24 78.7 kg (173 lb 8 oz)   03/20/24 77.1 kg (170 lb)   03/14/24 77.1 kg (170 lb)   03/12/24 82.6 kg (182 lb)       Estimated Nutrition Needs: 30-32 kcals/kg = [id number]  KCALS/d as per stated wt; 1.5 gms protein/kg = 112 gms/d    Assessment/Plan:  RD f/u w/ pt via phone today regarding nutrition progress.     Pt states has not been using jejunostomy for greater than 1 week for nutrition supplement as feels he is adequately meeting his nutrition needs po. He had been on [employer] 1.5 despite [place] RD conversation of pt being sent home w/ Vital 1.5.     Pt noted being sore from recent surgery but is eating better and tolerating more foods and consistencies w/o GI c/o. Pt reporting normal, formed, brown-colored BM. He notes taking 1 PERT orally w/ first bite then a second PERT 1/2 way through his meals.     Diet hx revealed ramen, egg, cheese sandwich, lomein fried rice w/ chicken and plans to have steak, corn, buttered noodles for dinner. Pt notes dirnking 2 Kick[employer]/d, protein smoothie (30 gms protein, 130 calories) daily.     RD offered support/encouragement and will f/u w/ pt at upcoming oncologist visit in June.     The 21st Century Cures Act makes medical notes like these available to patients in the interest of transparency. Please be  advised this is a medical document. Medical documents are intended to carry relevant information, facts as evident, and the clinical opinion of the practitioner. The medical note is intended as peer to peer communication and may appear blunt or direct. It is written in medical language and may contain abbreviations or verbiage that are unfamiliar.

## 2024-05-28 ENCOUNTER — HOSPITAL ENCOUNTER (EMERGENCY)
Facility: HOSPITAL | Age: 54
Discharge: HOME | End: 2024-05-28
Attending: EMERGENCY MEDICINE

## 2024-05-28 ENCOUNTER — APPOINTMENT (OUTPATIENT)
Dept: GENERAL RADIOLOGY | Facility: HOSPITAL | Age: 54
End: 2024-05-28
Attending: EMERGENCY MEDICINE

## 2024-05-28 VITALS
SYSTOLIC BLOOD PRESSURE: 145 MMHG | HEIGHT: 74 IN | BODY MASS INDEX: 20.53 KG/M2 | RESPIRATION RATE: 19 BRPM | OXYGEN SATURATION: 100 % | HEART RATE: 74 BPM | TEMPERATURE: 99 F | DIASTOLIC BLOOD PRESSURE: 97 MMHG | WEIGHT: 160 LBS

## 2024-05-28 DIAGNOSIS — M96.842 POSTOPERATIVE SEROMA OF MUSCULOSKELETAL STRUCTURE AFTER MUSCULOSKELETAL PROCEDURE: Primary | ICD-10-CM

## 2024-05-28 LAB
ALBUMIN SERPL-MCNC: 1.4 G/DL (ref 3.4–5)
ALBUMIN SERPL-MCNC: 2.3 G/DL (ref 3.4–5)
ALBUMIN/GLOB SERPL: 0.6 {RATIO} (ref 1–2)
ALBUMIN/GLOB SERPL: 0.6 {RATIO} (ref 1–2)
ALP LIVER SERPL-CCNC: 118 U/L
ALP LIVER SERPL-CCNC: 73 U/L
ALT SERPL-CCNC: 14 U/L
ALT SERPL-CCNC: 23 U/L
ANION GAP SERPL CALC-SCNC: 5 MMOL/L (ref 0–18)
ANION GAP SERPL CALC-SCNC: 8 MMOL/L (ref 0–18)
AST SERPL-CCNC: 17 U/L (ref 15–37)
AST SERPL-CCNC: 26 U/L (ref 15–37)
BASOPHILS # BLD AUTO: 0.04 X10(3) UL (ref 0–0.2)
BASOPHILS NFR BLD AUTO: 0.5 %
BILIRUB SERPL-MCNC: 0.2 MG/DL (ref 0.1–2)
BILIRUB SERPL-MCNC: 0.2 MG/DL (ref 0.1–2)
BUN BLD-MCNC: 10 MG/DL (ref 9–23)
BUN BLD-MCNC: 7 MG/DL (ref 9–23)
CALCIUM BLD-MCNC: 5.1 MG/DL (ref 8.5–10.1)
CALCIUM BLD-MCNC: 8 MG/DL (ref 8.5–10.1)
CHLORIDE SERPL-SCNC: 109 MMOL/L (ref 98–112)
CHLORIDE SERPL-SCNC: 125 MMOL/L (ref 98–112)
CO2 SERPL-SCNC: 16 MMOL/L (ref 21–32)
CO2 SERPL-SCNC: 25 MMOL/L (ref 21–32)
CREAT BLD-MCNC: 0.16 MG/DL
CREAT BLD-MCNC: 0.46 MG/DL
EGFRCR SERPLBLD CKD-EPI 2021: 124 ML/MIN/1.73M2 (ref 60–?)
EGFRCR SERPLBLD CKD-EPI 2021: 171 ML/MIN/1.73M2 (ref 60–?)
EOSINOPHIL # BLD AUTO: 0.21 X10(3) UL (ref 0–0.7)
EOSINOPHIL NFR BLD AUTO: 2.5 %
ERYTHROCYTE [DISTWIDTH] IN BLOOD BY AUTOMATED COUNT: 15.2 %
GLOBULIN PLAS-MCNC: 2.4 G/DL (ref 2.8–4.4)
GLOBULIN PLAS-MCNC: 3.7 G/DL (ref 2.8–4.4)
GLUCOSE BLD-MCNC: 90 MG/DL (ref 70–99)
GLUCOSE BLD-MCNC: 94 MG/DL (ref 70–99)
HCT VFR BLD AUTO: 37.9 %
HGB BLD-MCNC: 11.7 G/DL
IMM GRANULOCYTES # BLD AUTO: 0.07 X10(3) UL (ref 0–1)
IMM GRANULOCYTES NFR BLD: 0.8 %
LYMPHOCYTES # BLD AUTO: 0.93 X10(3) UL (ref 1–4)
LYMPHOCYTES NFR BLD AUTO: 11.3 %
MCH RBC QN AUTO: 29.9 PG (ref 26–34)
MCHC RBC AUTO-ENTMCNC: 30.9 G/DL (ref 31–37)
MCV RBC AUTO: 96.9 FL
MONOCYTES # BLD AUTO: 0.83 X10(3) UL (ref 0.1–1)
MONOCYTES NFR BLD AUTO: 10.1 %
NEUTROPHILS # BLD AUTO: 6.16 X10 (3) UL (ref 1.5–7.7)
NEUTROPHILS # BLD AUTO: 6.16 X10(3) UL (ref 1.5–7.7)
NEUTROPHILS NFR BLD AUTO: 74.8 %
OSMOLALITY SERPL CALC.SUM OF ELEC: 287 MOSM/KG (ref 275–295)
OSMOLALITY SERPL CALC.SUM OF ELEC: 306 MOSM/KG (ref 275–295)
PLATELET # BLD AUTO: 230 10(3)UL (ref 150–450)
POTASSIUM SERPL-SCNC: 2.1 MMOL/L (ref 3.5–5.1)
POTASSIUM SERPL-SCNC: 3.9 MMOL/L (ref 3.5–5.1)
PROT SERPL-MCNC: 3.8 G/DL (ref 6.4–8.2)
PROT SERPL-MCNC: 6 G/DL (ref 6.4–8.2)
RBC # BLD AUTO: 3.91 X10(6)UL
SODIUM SERPL-SCNC: 139 MMOL/L (ref 136–145)
SODIUM SERPL-SCNC: 149 MMOL/L (ref 136–145)
WBC # BLD AUTO: 8.2 X10(3) UL (ref 4–11)

## 2024-05-28 PROCEDURE — 80053 COMPREHEN METABOLIC PANEL: CPT | Performed by: EMERGENCY MEDICINE

## 2024-05-28 PROCEDURE — 96376 TX/PRO/DX INJ SAME DRUG ADON: CPT

## 2024-05-28 PROCEDURE — 85025 COMPLETE CBC W/AUTO DIFF WBC: CPT | Performed by: EMERGENCY MEDICINE

## 2024-05-28 PROCEDURE — 71046 X-RAY EXAM CHEST 2 VIEWS: CPT | Performed by: EMERGENCY MEDICINE

## 2024-05-28 PROCEDURE — 99285 EMERGENCY DEPT VISIT HI MDM: CPT

## 2024-05-28 PROCEDURE — 96374 THER/PROPH/DIAG INJ IV PUSH: CPT

## 2024-05-28 PROCEDURE — 93010 ELECTROCARDIOGRAM REPORT: CPT

## 2024-05-28 PROCEDURE — 93005 ELECTROCARDIOGRAM TRACING: CPT

## 2024-05-28 RX ORDER — MORPHINE SULFATE 4 MG/ML
4 INJECTION, SOLUTION INTRAMUSCULAR; INTRAVENOUS ONCE
Status: COMPLETED | OUTPATIENT
Start: 2024-05-28 | End: 2024-05-28

## 2024-05-28 RX ORDER — HYDROCODONE BITARTRATE AND ACETAMINOPHEN 5; 325 MG/1; MG/1
1-2 TABLET ORAL EVERY 4 HOURS PRN
Qty: 12 TABLET | Refills: 0 | Status: SHIPPED | OUTPATIENT
Start: 2024-05-28 | End: 2024-06-03 | Stop reason: ALTCHOICE

## 2024-05-28 NOTE — ED INITIAL ASSESSMENT (HPI)
Patient reports right lateral rib/thoracic pain which began Friday 10/10. Patient had Thoracic surgery 4/30 and drains removed 5/15 and 5/17. Patient began feeling SOB Friday as well

## 2024-05-28 NOTE — ED QUICK NOTES
THIS WRITER ATTEMPTED TO DO EKG, RN IN ROOM ACCESSING PORT. WHEN PATIENT BECAME AVAILABLE XRAY TOOK PATIENT TO EXAM. EKG DELAYED DUE TO PATIENT BEING UNAVAILABLE.

## 2024-05-28 NOTE — ED PROVIDER NOTES
Patient Seen in: [facility] Emergency Department      History     Chief Complaint   Patient presents with    Difficulty Breathing     Stated Complaint: shortness of breath, extensive hx of cancer, swelling to back where drains were*    Subjective:   HPI    53-year-old male presents reporting swelling to the right flank area.  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  He reports that he just had the drains from the area removed 4 days ago.  Yesterday they noticed some swelling to the right flank.  They tried calling the thoracic surgeon this morning but have not heard back so they decided to come to the emergency room.  He also reports chronic persistent pain in the right chest region related to the surgical intervention and says his home pain medicine is not helping.  He does have follow-up in place tomorrow with his thoracic surgeon.  He denies any bruising, redness, warmth to the area.  No trauma to the area reported.  No acute shortness of breath reported.    Objective:   Past Medical History:    Back problem    Belching    Black stools    Borderline diabetes    Dx in 8/2013 - HgA1C 6.2%    C. difficile diarrhea    pt treated and without symptoms    Chest pain    Coronary artery disease    On 8/16/13: CABG x 4 with LIMA to LAD and SVG to diagonal, OM and PDA    Decorative tattoo    Depression    Difficult intubation    h/o esophagectomy for CA; developed esophagobronchial vistula    Disorder of liver    LIVER CA    Esophageal cancer (HCC)    completed chemo    Esophageal reflux    Essential hypertension    Exposure to medical diagnostic radiation    last tx 8/18/2022    Frequent urination    Gastroparesis    Heartburn    High blood pressure    High cholesterol    Found when I had quadruple bypass    History of COVID-19    asymptomatic - pt was dx during a hospitalization for another diagnosis. No continued symptoms    History of stomach ulcers    Hyperlipidemia    Hyperlipidemia LDL goal < 70     Indigestion    Morbid obesity with BMI of 40.0-44.9, adult (HCC)    Muscle weakness    Nausea vomiting and diarrhea    Nontoxic multinodular goiter    Dx in 8/2013: pt was told that imaging showed thyroid cysts per PCP    Pancreatic cancer (HCC)    last dose 12/4/2023 is scheduled for another round 12/27/23    Peripheral vascular disease (HCC)    pt denies    Personal history of antineoplastic chemotherapy    for esophageal cancer/completed    Personal history of antineoplastic chemotherapy    pancreatic cancer    Personal history of antineoplastic chemotherapy    Last treatment 3/12/24    Problems with swallowing    Pulmonary nodules    Dx in 8/2013: CT chest showed small bilateral fissural-based lung nodules less than 1 cm    S/P CABG x 4    On 8/16/13: CABG x 4 with LIMA to LAD and SVG to diagonal, OM and PDA    Shortness of breath    when coughing; no oxygen    Vomiting              Past Surgical History:   Procedure Laterality Date    Appendectomy      Appendectomy      Arthroscopy of joint unlisted      right shoulder    Cabg      On 8/16/13: CABG x 4 with LIMA to LAD and SVG to diagonal, OM and PDA    Cardiac cath lab      On 8/14/2013: cardiac cath showed 3-vessel disease    Other surgical history      1.       Laparoscopic robotic-assisted esophagogastrectomy.    Port, indwelling, imp                  Social History     Socioeconomic History    Marital status:     Number of children: 3   Occupational History    Occupation: works as  - on workman's comp   Tobacco Use    Smoking status: Former     Current packs/day: 0.00     Average packs/day: 1 pack/day for 27.0 years (27.0 ttl pk-yrs)     Types: Cigarettes     Start date: 8/15/1986     Quit date: 8/15/2013     Years since quitting: 10.7    Smokeless tobacco: Never    Tobacco comments:     Quit smoking 2013   Vaping Use    Vaping status: Never Used   Substance and Sexual Activity    Alcohol use: Not Currently    Drug use: Never     Sexual activity: Not Currently     Partners: Female   Other Topics Concern    Caffeine Concern Yes    Stress Concern No    Weight Concern No    Special Diet No    Exercise No    Seat Belt No   Social History Narrative    Lives with life partner    Has 3 daughters - 1 lives closeby, 1 in [place], 1 in [place]     Social Determinants of Health     Financial Resource Strain: Low Risk  (12/14/2023)    Financial Resource Strain     Difficulty of Paying Living Expenses: Not very hard     Med Affordability: No   Food Insecurity: No Food Insecurity (4/16/2024)    Food Insecurity     Food Insecurity: Never true   Transportation Needs: No Transportation Needs (4/16/2024)    Transportation Needs     Lack of Transportation: No   Housing Stability: Low Risk  (4/16/2024)    Housing Stability     Housing Instability: No     Housing Instability Emergency: No              Review of Systems    Positive for stated complaint: shortness of breath, extensive hx of cancer, swelling to back where drains were*  Other systems are as noted in HPI.  Constitutional and vital signs reviewed.      All other systems reviewed and negative except as noted above.    Physical Exam     ED Triage Vitals   BP 05/28/24 1047 (!) 151/94   Pulse 05/28/24 1047 93   Resp 05/28/24 1047 16   Temp 05/28/24 1047 98.5 °F (36.9 °C)   Temp src 05/28/24 1047 Temporal   SpO2 05/28/24 1047 100 %   O2 Device 05/28/24 1133 None (Room air)       Current Vitals:   Vital Signs  BP: (!) 145/97  Pulse: 74  Resp: 19  Temp: 98.5 °F (36.9 °C)  Temp src: Temporal  MAP (mmHg): (!) 111    Oxygen Therapy  SpO2: 100 %  O2 Device: None (Room air)            Physical Exam    General:  Vitals as listed.  No acute distress   Lungs: good air exchange and clear   Heart: regular rate rhythm and no murmur   Abdomen: Soft and nontender.  No abdominal masses.  No peritoneal signs   MSK: In the right flank there is a soft fluid collection in the soft tissue that appears most consistent with a seroma.   There is no erythema, induration, warmth, ecchymosis to the area.    Skin: no rashes or nodules    ED Course     Labs Reviewed   COMP METABOLIC PANEL (14) - Abnormal; Notable for the following components:       Result Value    Sodium 149 (*)     Potassium 2.1 (*)     Chloride 125 (*)     CO2 16.0 (*)     BUN 7 (*)     Creatinine 0.16 (*)     Calcium, Total 5.1 (*)     Calculated Osmolality 306 (*)     ALT 14 (*)     Total Protein 3.8 (*)     Albumin 1.4 (*)     Globulin  2.4 (*)     A/G Ratio 0.6 (*)     All other components within normal limits   COMP METABOLIC PANEL (14) - Abnormal; Notable for the following components:    Creatinine 0.46 (*)     Calcium, Total 8.0 (*)     Alkaline Phosphatase 118 (*)     Total Protein 6.0 (*)     Albumin 2.3 (*)     A/G Ratio 0.6 (*)     All other components within normal limits   CBC W/ DIFFERENTIAL - Abnormal; Notable for the following components:    RBC 3.91 (*)     HGB 11.7 (*)     HCT 37.9 (*)     MCHC 30.9 (*)     Lymphocyte Absolute 0.93 (*)     All other components within normal limits   CBC WITH DIFFERENTIAL WITH PLATELET    Narrative:     The following orders were created for panel order CBC With Differential With Platelet.  Procedure                               Abnormality         Status                     ---------                               -----------         ------                     CBC W/ DIFFERENTIAL[[id number]]          Abnormal            Final result                 Please view results for these tests on the individual orders.   RAINBOW DRAW LAVENDER   RAINBOW DRAW LIGHT GREEN   RAINBOW DRAW BLUE     EKG    Rate, intervals and axes as noted on EKG Report.  Rate: 86  Rhythm: Sinus Rhythm  Reading: No acute ischemia                 XR CHEST PA + LAT CHEST (CPT=71046)    Result Date: 5/28/2024  PROCEDURE:  XR CHEST PA + LAT CHEST (CPT=71046)  INDICATIONS:  shortness of breath, extensive hx of cancer, swelling to back where drains were removed 5/17 after  thoracic surgery  COMPARISON:  [facility] , XR, XR CHEST AP PORTABLE  (CPT=71045), 4/24/2024, 7:44 AM.  TECHNIQUE:  PA and lateral chest radiographs were obtained.  PATIENT STATED HISTORY: (As transcribed by Technologist)  Patient offered no additional history at this time.    FINDINGS:   Abnormal accumulation of soft tissue gas projects over the right superolateral chest/axilla/medial shoulder region, with a focus of gas measuring about 5.9 x 3.1 cm including radiographic magnification this region best seen on the frontal view.  Such gas  was not present on the previous radiograph from just over 1 month ago.  There is also a small focus of air at the right apex with a flat inferior margin suggesting small amount of fluid and air and small pneumothorax with pleural effusion at the right apex.  Moderate pleural thickening/loculated fluid throughout the right lateral chest especially right upper.  Continued blunting right lateral costophrenic angle may reflect any combination of effusion, scarring, infiltrate.  Left chest clear.  Heart normal size.  CT compatible central venous catheter tip in lower SVC/RA junction.            CONCLUSION:  Abnormal foci of air, including some air outside the bony thorax overlying the right upper lateral chest and shoulder region.  Tiny pneumothorax and pleural fluid right apex.  Pleural thickening right lateral chest.  Blunting costophrenic angle on the right.  Normal heart size.   LOCATION:  [facility]   Dictated by (CST): Ernesto Cabrera MD on 5/28/2024 at 11:53 AM     Finalized by (CST): Ernesto Cabrera MD on 5/28/2024 at 11:56 AM              MDM      53-year-old male who had recent thoracotomy for repair of tracheoesophageal fistula presents reporting a swollen area to his right flank that developed after drains were removed from his surgical intervention a few days ago.  The area is soft without induration, warmth, ecchymosis.  It is nontender.    Additional history obtained by  patient's wife who reports that he has been out of breath but that is been consistent and they have not noticed any acute change    Differential includes but is not limited to pneumothorax, pleural effusion, soft tissue seroma, a life threat.    Chest x-ray, CBC, CMP ordered for further evaluation.    My independent interpretation of chest x-ray is that there is no significant effusion.    Radiology reports that there is a \"tiny pneumothorax and pleural fluid right apex\".  Reviewed these findings and the palpable seroma with the thoracic surgery team.  The patient has follow-up in place tomorrow.  They feel that the seroma should be addressed with the plastic surgeon who is managing the drains.  Patient having difficulty with pain at home and provided prescription for hydrocodone.  Original labs showed significant hypokalemia at 2.1.  However, with elevated sodium and chloride there was concern for contamination with saline flush.  Labs were resent and returned unremarkable.  Okay for discharge home and instructed to follow-up with thoracic surgery as scheduled tomorrow.  Should continue monitoring the area of the right flank and return with induration, redness, warmth, or with any concerns.                                       Medical Decision Making      Disposition and Plan     Clinical Impression:  1. Postoperative seroma of musculoskeletal structure after musculoskeletal procedure         Disposition:  Discharge  5/28/2024  1:44 pm    Follow-up:  Sudarshan Koroma Jr., MD  [address] [place] [id number] [phone]    Follow up  Follow-up with your thoracic surgeon tomorrow as scheduled    Yanely Vargas  [address] [facility] [address] [phone]    Call            Medications Prescribed:  Current Discharge Medication List        START taking these medications    Details   !! HYDROcodone-acetaminophen 5-325 MG Oral Tab Take 1-2 tablets by mouth every 4 (four) hours as needed for  Pain.  Qty: 12 tablet, Refills: 0    Associated Diagnoses: Postoperative seroma of musculoskeletal structure after musculoskeletal procedure       !! - Potential duplicate medications found. Please discuss with provider.

## 2024-05-29 ENCOUNTER — OFFICE VISIT (OUTPATIENT)
Dept: HEMATOLOGY/ONCOLOGY | Facility: HOSPITAL | Age: 54
End: 2024-05-29
Attending: INTERNAL MEDICINE

## 2024-05-29 VITALS
TEMPERATURE: 97 F | HEART RATE: 54 BPM | HEIGHT: 73.82 IN | SYSTOLIC BLOOD PRESSURE: 160 MMHG | DIASTOLIC BLOOD PRESSURE: 91 MMHG | RESPIRATION RATE: 16 BRPM | OXYGEN SATURATION: 100 % | BODY MASS INDEX: 21.11 KG/M2 | WEIGHT: 164.5 LBS

## 2024-05-29 DIAGNOSIS — J86.0: Primary | ICD-10-CM

## 2024-05-29 LAB
ATRIAL RATE: 86 BPM
P AXIS: 19 DEGREES
P-R INTERVAL: 114 MS
Q-T INTERVAL: 362 MS
QRS DURATION: 82 MS
QTC CALCULATION (BEZET): 433 MS
R AXIS: 59 DEGREES
T AXIS: 80 DEGREES
VENTRICULAR RATE: 86 BPM

## 2024-05-29 PROCEDURE — 99211 OFF/OP EST MAY X REQ PHY/QHP: CPT

## 2024-05-29 RX ORDER — HYDROCODONE BITARTRATE AND ACETAMINOPHEN 5; 325 MG/1; MG/1
1-2 TABLET ORAL EVERY 4 HOURS PRN
Qty: 30 TABLET | Refills: 0 | Status: SHIPPED | OUTPATIENT
Start: 2024-05-29 | End: 2024-06-03

## 2024-05-29 NOTE — PROGRESS NOTES
Thoracic Surgery Post-Op Progress Note    Mr. Cortez is a 53 year old male who presents today in follow up after an EGD with stent removal and thoracotomy with bronchoesophgeal fistula repair performed on 4/30/24 at [facility]. He is doing well. He complains of incisional pain controlled best with norco. He is tolerating a general diet without dysphagia. He stopped J tube feeding about 1.5 weeks ago. Coughing much less than he was before surgery. He denies fevers or chills. No hemoptysis. No shortness of breath. He noted some swelling over his right lower back two days ago and presented to the ER yesterday regarding this. He was instructed to follow up with plastic surgery. His drains were removed about two weeks ago. He has been staying active and using incentive spirometry at home.    PMH:  Past Medical History:    Back problem    Belching    Black stools    Borderline diabetes    Dx in 8/2013 - HgA1C 6.2%    C. difficile diarrhea    pt treated and without symptoms    Chest pain    Coronary artery disease    On 8/16/13: CABG x 4 with LIMA to LAD and SVG to diagonal, OM and PDA    Decorative tattoo    Depression    Difficult intubation    h/o esophagectomy for CA; developed esophagobronchial vistula    Disorder of liver    LIVER CA    Esophageal cancer (HCC)    completed chemo    Esophageal reflux    Essential hypertension    Exposure to medical diagnostic radiation    last tx 8/18/2022    Frequent urination    Gastroparesis    Heartburn    High blood pressure    High cholesterol    Found when I had quadruple bypass    History of COVID-19    asymptomatic - pt was dx during a hospitalization for another diagnosis. No continued symptoms    History of stomach ulcers    Hyperlipidemia    Hyperlipidemia LDL goal < 70    Indigestion    Morbid obesity with BMI of 40.0-44.9, adult (HCC)    Muscle weakness    Nausea vomiting and diarrhea    Nontoxic multinodular goiter    Dx in 8/2013: pt was told that imaging showed thyroid  cysts per PCP    Pancreatic cancer (HCC)    last dose 12/4/2023 is scheduled for another round 12/27/23    Peripheral vascular disease (HCC)    pt denies    Personal history of antineoplastic chemotherapy    for esophageal cancer/completed    Personal history of antineoplastic chemotherapy    pancreatic cancer    Personal history of antineoplastic chemotherapy    Last treatment 3/12/24    Problems with swallowing    Pulmonary nodules    Dx in 8/2013: CT chest showed small bilateral fissural-based lung nodules less than 1 cm    S/P CABG x 4    On 8/16/13: CABG x 4 with LIMA to LAD and SVG to diagonal, OM and PDA    Shortness of breath    when coughing; no oxygen    Vomiting       Meds:    Current Outpatient Medications:     HYDROcodone-acetaminophen 5-325 MG Oral Tab, Take 1-2 tablets by mouth every 4 (four) hours as needed for Pain., Disp: 12 tablet, Rfl: 0    oxyCODONE 5 MG Oral Tab, Take 1 tablet (5 mg total) by mouth every 4 (four) hours as needed for Pain., Disp: 40 tablet, Rfl: 0    SERTRALINE 100 MG Oral Tab, TAKE 1.5 TABLETS (150MG TOTAL) BY MOUTH DAILY, Disp: 135 tablet, Rfl: 1    albuterol (2.5 MG/3ML) 0.083% Inhalation Nebu Soln, Take 3 mL (2.5 mg total) by nebulization 3 (three) times daily., Disp: 90 each, Rfl: 0    HYDROcodone-acetaminophen 5-325 MG Oral Tab, Take 1 tablet by mouth every 4 (four) hours as needed for Pain., Disp: 20 tablet, Rfl: 0    metoprolol tartrate 25 MG Oral Tab, Take 1 tablet (25 mg total) by mouth 2x Daily(Beta Blocker)., Disp: 60 tablet, Rfl: 1    morphINE 10 MG/5ML Oral Solution, 5 mL (10 mg total) by Per G Tube route every 6 (six) hours., Disp: 300 mL, Rfl: 0    baclofen 10 MG Oral Tab, Take 1 tablet (10 mg total) by mouth 3 (three) times daily as needed., Disp: , Rfl:     OLANZapine 5 MG Oral Tab, Take 1 tablet (5 mg total) by mouth nightly., Disp: 30 tablet, Rfl: 3    metoclopramide (REGLAN) 10 MG Oral Tab, Take 1 tablet (10 mg total) by mouth 4 (four) times daily before  meals and nightly., Disp: 120 tablet, Rfl: 2    Pancrelipase, Lip-Prot-Amyl, (CREON) 62662-04112 units Oral Cap DR Particles, Take 2 capsules with meals and 1 capsule with snacks, Disp: 240 capsule, Rfl: 0    gabapentin 300 MG Oral Cap, Take 1 capsule (300 mg total) by mouth in the morning and 1 capsule (300 mg total) before bedtime., Disp: 180 capsule, Rfl: 0    losartan 50 MG Oral Tab, Take 1 tablet (50 mg total) by mouth daily., Disp: 90 tablet, Rfl: 2    Omeprazole 40 MG Oral Capsule Delayed Release, Take 1 capsule (40 mg total) by mouth 2 (two) times daily before meals., Disp: 90 capsule, Rfl: 3    Vital Signs:    BP (!) 160/91 (BP Location: Right arm, Patient Position: Sitting, Cuff Size: adult)   Pulse 54   Temp 97.4 °F (36.3 °C) (Tympanic)   Resp 16   Ht 1.875 m (6' 1.82\")   Wt 74.6 kg (164 lb 8 oz)   SpO2 100%   BMI 21.22 kg/m²     Physical Exam:    General: well appearing male in no acute distress  HEENT: Normocephalic, PERRL, EOMI  Heart: regular rate and rhythm. No murmurs, rubs or gallops. No lower extremity edema.  Lungs: Normal respiratory effort. Decreased breath sounds over right side.   Chest: incisions c/d/I without signs of infection. Soft fluid collection palpated inferior to thoracotomy incision, consistent with seroma   Chest tube stitch removed.   Abdomen: Soft, Non-tender, non-distended. J tube in place.   Extremities: No clubbing or cyanosis. No lateralizing weakness  Neuro: No gross cranial nerve defects, no loss of sensation  Psych: oriented to person place and time, normal mood and affect    Pathology:  FINAL DIAGNOSIS     BONE, RIGHT THIRD RIB, PROCEDURE NOT SPECIFIED:   -FRAGMENTS OF VIABLE BONE   -NO ACUTE INFLAMMATION     Assesment:    Mr. Cortez is a 53 year old male who presents today in follow up after an EGD with stent removal, thoracotomy with bronchoesophgeal fistula repair via latissimus muscle flap performed on April 30th, 2024. He is doing quite well. Mr. Cortez is  tolerating a general diet. He met with nutrition and is no longer using his J tube. Removal per Dr. Anand- appointment scheduled for 6/7/24. In regards to the possible seroma, patient will need to follow up with Dr. Vargas with plastic surgery at [facility]. Our office will help him arrange this. Otherwise, patient's only complaint is persistent incisional pain. This is stable and norco helped his pain more than oxycodone. Provided patient with norco prescription. Further follow up with us as needed.     Plan:  Encouraged continued exercise and incentive spirometer use.  No heavy lifting, pushing or pulling for one month after surgery.   Energy refilled   General diet. J tube removal per Dr. Ritchie.   Follow up with plastic surgery regarding possible seroma.   Mr. Cortez should follow up with thoracic surgery on an as needed basis. He is encouraged to call with any further questions or concerns.     Yohana Jeffrey PA-C  Thoracic Surgery

## 2024-05-30 ENCOUNTER — SOCIAL WORK SERVICES (OUTPATIENT)
Dept: HEMATOLOGY/ONCOLOGY | Facility: HOSPITAL | Age: 54
End: 2024-05-30

## 2024-05-30 NOTE — PROGRESS NOTES
[facility] Note    Diagnosis:   Metastatic Esophageal Cancer. Possible 2nd pancreatic primary?  -Initially cT3 N1 Esophageal Adenocarcinoma (stage IIIB)  -HER2 amplified by FISH    Treatment History:   1. Neoadjuvant chemoRT with carbo-taxol: 7/6/22-8/10/22    2. laparoscopic robotic-assisted esophagogastrectomy with feeding jejunostomy tube placement on 9/30/2022.     3. Adjuvant Opdivo: 1/9/23-2/20/23    ---Metastatic disease developed-----    5. Chemo-Herceptin-Immunotherapy: 5/1/23-09/2023 --> Switched to Herceptin/Immunotherapy maintenance on 9/25/23 due to negative signatera.     6. Chemotherapy restarted on 2/5/24 due to progression     Visit Diagnosis:  1. Metastatic adenocarcinoma to pancreas (HCC)        History of Present Illness: 53M with a PMH of CAD status post CABG, HLD and HTN is here for follow up for metastatic esophageal adenocarcinoma, HER2+. He has either a 2nd pancreatic primary vs. Metastatic esophageal to the pancreas.    Oncology History   -2018: Per report had a normal EGD and colonoscopy    -June 2022: He met with GI due to new onset dysphagia which started about 1 month prior to his visit.  He had an esophagram with a focal abrupt narrowing with irregular margins in the distal one third of the esophagus extending to the GE junction.  He also had an episode of food impaction. He has also lost about 15 lb. He was a heavy smoker from age 14 to about 40 (1.5 PPD). Also with alcohol use currently. Mother with a history of breast and colon cancer.  EGD and EUS with Dr. Yadav on 6/7/2022: Severe esophagitis with a concerning mass extending 37-39 centimeters from the incisors.  Nonobstructive mass.  By EUS uT3N1 disease.  Pathology showed invasive moderate to poorly differentiated adenocarcinoma.  HER2 amplified by FISH  .CT CAP done at [facility] on 6/16/22: Results not loaded to PACs yet.  CA 19-9 from the same day was 107.4.  CEA normal. PET/CT on  6/20/2022: Increased distal esophageal uptake with an SUV of 14.4.  Concern for shreya metastases.    -Concurrent chemoradiation with carboplatin AUC2 plus paclitaxel 50 mg/m2: July 2022-August 2022 with  down (280-->30). Post treatment PET/CT showed improvement.     -Surgery with Dr. Lu on 9/30/22: ypT1b pN0. Recovery has been complicated by an esophageal leak,     -I met with him on 12/12/22. We discussed adjuvant opdivo for 1 year. His  was elevated to 48 and repeat was 64. CT CAP was recommended.     -He was admitted on 12/25/22 for abdominal pain. He had a POEM procedure done. He was also noted to have a RLL consolidation. He was seen by pulmonary, based on imaging an outpatient PET/CT was recommended and a biopsy of the most FDG avid lesion would be considered. Noted to have CAD and an outpatient evaluation was recommended. Outpatient PET/CT done on 1/4/23 was reviewed in tumor board and there was no focal area of concern and adjuvant immunotherapy recommended.     -Adjuvant opdivo:01/2023-03/2023. PET/CT in in 04/2023 showed uptake in the pancreatic head and tail. Also with some uptake in liver. EGD/EUS on 4/27/23 with Dr. Ritchie: Pancreatic head mass positive for adenocarcinoma: Comparison to previous esophageal cancer shows similarity but IHC in non-specific. Her 2 IHC was 2+ on this specimen but FISH was negative.     -Chemo + Herceptin + Immunotherapy: He received 7 cycles of FOLFOX + Herceptin + Immunotherapy (05/2023-09/2023). Imaging after C5 showed a favorable response. After C7, we held oxaliplatin due to neuropathy and he was started on maintenance herceptin + IO maintenance. Signatera was negative 09/11/23.    -Herceptin + IO Maintenance: He received 3 cycles from 09/25/23-10/31/23. Treatment was delayed due to hospitalizations.     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 Due to new findings-restarting FOLFOX discussed.     -admitted from 1/8/24-1/13/24 for persistent dysphagia and bronchesophageal stent. Plan is for an ASD occluder and removal of the bronchial stent as an outpatient.     -1/17/24: EGD with fistula closure with ASD occluder and removal of the bronchial stent.    -FOLFOX + Herceptin + Opdivo restarted: 3 cycles: 02/2024-03/2024    -Treatment delayed again for recurrent hospital admissions    -He underwent a Latissimus dorsi flap reconstruction for his bronchoesophageal fistula on 5/1/24    -PET/CT 5/15/24: at least 3 areas of uptake in the right hepatic lobe, uptake in pancrease and retroperitoneum. Post-operative uptake in chest wall.     Interval History:   -Was in [place] this weekend to get an abscess drained. No antibiotics needed  -Plans to have J tube removed this week. He is eating solids.   -He wants to remain aggressive with treatment.  -Occasional falls at home. Not sure if this is due to narcotics. He will follow up with palliative care     [employer]: 12 Point ROS was completed and pertinent positives are in the HPI    Current Outpatient Medications on File Prior to Visit   Medication Sig Dispense Refill    First-Lansoprazole 3 MG/ML Oral Suspension Take 30 mg by mouth daily.      ondansetron 4 MG Oral Tablet Dispersible 1 tablet (4 mg total).      HYDROcodone-acetaminophen 5-325 MG Oral Tab Take 1-2 tablets by mouth every 4 (four) hours as needed for Pain. 30 tablet 0    SERTRALINE 100 MG Oral Tab TAKE 1.5 TABLETS (150MG TOTAL) BY MOUTH DAILY 135 tablet 1    metoprolol tartrate 25 MG Oral Tab Take 1 tablet (25 mg total) by mouth 2x Daily(Beta Blocker). 60 tablet 1    baclofen 10 MG Oral Tab Take 1 tablet (10 mg total) by mouth 3  (three) times daily as needed.      metoclopramide (REGLAN) 10 MG Oral Tab Take 1 tablet (10 mg total) by mouth 4 (four) times daily before meals and nightly. 120 tablet 2    Pancrelipase, Lip-Prot-Amyl, (CREON) 18739-26928 units Oral Cap DR Particles Take 2 capsules with meals and 1 capsule with snacks 240 capsule 0    losartan 50 MG Oral Tab Take 1 tablet (50 mg total) by mouth daily. 90 tablet 2    morphINE 10 MG/5ML Oral Solution 5 mL (10 mg total) by Per G Tube route every 6 (six) hours. (Patient not taking: Reported on 6/3/2024) 300 mL 0    OLANZapine 5 MG Oral Tab Take 1 tablet (5 mg total) by mouth nightly. (Patient not taking: Reported on 6/3/2024) 30 tablet 3     Current Facility-Administered Medications on File Prior to Visit   Medication Dose Route Frequency Provider Last Rate Last Admin    [COMPLETED] morphINE PF 4 MG/ML injection 4 mg  4 mg Intravenous Once Aman Torrez MD   4 mg at 05/28/24 1224    [COMPLETED] morphINE PF 4 MG/ML injection 4 mg  4 mg Intravenous Once Aman Torrez MD   4 mg at 05/28/24 1326    [COMPLETED] heparin (Porcine) 100 Units/mL lock flush 500 Units  5 mL Intracatheter Once Katie Conner APRN   500 Units at 05/15/24 1330     Past Medical History:    Back problem    Belching    Black stools    Borderline diabetes    Dx in 8/2013 - HgA1C 6.2%    C. difficile diarrhea    pt treated and without symptoms    Chest pain    Coronary artery disease    On 8/16/13: CABG x 4 with LIMA to LAD and SVG to diagonal, OM and PDA    Decorative tattoo    Depression    Difficult intubation    h/o esophagectomy for CA; developed esophagobronchial vistula    Disorder of liver    LIVER CA    Esophageal cancer (HCC)    completed chemo    Esophageal reflux    Essential hypertension    Exposure to medical diagnostic radiation    last tx 8/18/2022    Frequent urination    Gastroparesis    Heartburn    High blood pressure    High cholesterol    Found when I had quadruple bypass    History of  COVID-19    asymptomatic - pt was dx during a hospitalization for another diagnosis. No continued symptoms    History of stomach ulcers    Hyperlipidemia    Hyperlipidemia LDL goal < 70    Indigestion    Morbid obesity with BMI of 40.0-44.9, adult (HCC)    Muscle weakness    Nausea vomiting and diarrhea    Nontoxic multinodular goiter    Dx in 8/2013: pt was told that imaging showed thyroid cysts per PCP    Pancreatic cancer (HCC)    last dose 12/4/2023 is scheduled for another round 12/27/23    Peripheral vascular disease (HCC)    pt denies    Personal history of antineoplastic chemotherapy    for esophageal cancer/completed    Personal history of antineoplastic chemotherapy    pancreatic cancer    Personal history of antineoplastic chemotherapy    Last treatment 3/12/24    Problems with swallowing    Pulmonary nodules    Dx in 8/2013: CT chest showed small bilateral fissural-based lung nodules less than 1 cm    S/P CABG x 4    On 8/16/13: CABG x 4 with LIMA to LAD and SVG to diagonal, OM and PDA    Shortness of breath    when coughing; no oxygen    Vomiting     Past Surgical History:   Procedure Laterality Date    Appendectomy      Appendectomy      Arthroscopy of joint unlisted      right shoulder    Cabg      On 8/16/13: CABG x 4 with LIMA to LAD and SVG to diagonal, OM and PDA    Cardiac cath lab      On 8/14/2013: cardiac cath showed 3-vessel disease    Other surgical history      1.       Laparoscopic robotic-assisted esophagogastrectomy.    Port, indwelling, imp       Social History     Socioeconomic History    Marital status:     Number of children: 3   Occupational History    Occupation: works as  - on workman's comp   Tobacco Use    Smoking status: Former     Current packs/day: 0.00     Average packs/day: 1 pack/day for 27.0 years (27.0 ttl pk-yrs)     Types: Cigarettes     Start date: 8/15/1986     Quit date: 8/15/2013     Years since quitting: 10.8    Smokeless tobacco: Never     Tobacco comments:     Quit smoking 2013   Vaping Use    Vaping status: Never Used   Substance and Sexual Activity    Alcohol use: Not Currently    Drug use: Never    Sexual activity: Not Currently     Partners: Female      Family History   Problem Relation Age of Onset    Cancer Mother         breast and colon     Diabetes Neg        Physical Exam  Height: 187.5 cm (6' 1.82\") (06/03 0901)  Weight: 76 kg (167 lb 8 oz) (06/03 0901)  BSA (Calculated - sq m): 2.01 sq meters (06/03 0901)  Pulse: 61 (06/03 0901)  BP: 168/99 (06/03 0901)  Temp: 96.9 °F (36.1 °C) (06/03 0901)  Do Not Use - Resp Rate: --  SpO2: 99 % (06/03 0901)     General: NAD, AOX3  HEENT: clear op, mmm, no jvd, no scleral icterus  CV: RRR  Extremities: No edema   Lungs:  no increased work of breathing  Abd: soft nt nd +BS no hepatosplenomegaly  Neuro: CN: II-XII grossly intact    Results:  Lab Results   Component Value Date    WBC 6.8 06/03/2024    HGB 11.3 (L) 06/03/2024    HCT 36.4 (L) 06/03/2024    MCV 94.5 06/03/2024    .0 06/03/2024    EOSABS 0.38 04/20/2024     Lab Results   Component Value Date     06/03/2024    K 3.5 06/03/2024    CO2 27.0 06/03/2024     06/03/2024    BUN 11 06/03/2024    PHOS 2.9 03/15/2024    ALB 2.5 (L) 06/03/2024       Lab Results   Component Value Date     12/19/2023       Radiology: reviewed     Pathology: reviewed     Assessment and Plan:  53M with a PMH of CAD status post CABG, HLD and HTN was referred by Dr. Yadav for esophageal cancer.    Metastatic Esophageal Cancer, HER2+  -He had a good response to FOLFOX + immunotherapy + Herceptin. While on maintenance immunotherapy + herceptin he developed progressive disease. He has had many treatment interruptions for surgery, hospitalizations. Most recent PET/CT shows relatively stable disease given the amount of disruption. His wound appears adequately healed. We will plan on resuming FOLFOX (oxaliplain DR 75 mg/m2) + Herceptin + Opdivo q2 weeks.  He will start next week.   -We will repeat a PET/CT after 4 cycles of combined chemo-immunotherapy  -Repeat CBC, CMP,    -Due for TTE    Bronch-Esophageal fistula: s/p bronchial stent removal ASD occluder. Now s/p repair 5/1/24.     Neuropathy: stable     Cancer related pain: follow up with palliative care     COURTNEY Foster MD  [facility]

## 2024-05-30 NOTE — PROGRESS NOTES
SW assisted with work forms through [employer].  Completed form faxed to [phone].  MARIZA sent a copy of the confirmation and completed form to pt via [employer] message.    Brina Mukherjee [place] [address]  [address] [place] [address]  Ph: [phone] [email or web address]  Fax: [phone]

## 2024-05-31 ENCOUNTER — SOCIAL WORK SERVICES (OUTPATIENT)
Dept: HEMATOLOGY/ONCOLOGY | Facility: HOSPITAL | Age: 54
End: 2024-05-31

## 2024-05-31 NOTE — PROGRESS NOTES
MARIZA faxed STD paperwork to [place] fax [phone]. MARIZA sent a copy to  via [employer] message.    SHARON LittleW   at [address], [place] [address], [place] [address]  Ph: [phone] [email or web address]  Fax: [phone]

## 2024-06-03 ENCOUNTER — OFFICE VISIT (OUTPATIENT)
Dept: HEMATOLOGY/ONCOLOGY | Age: 54
End: 2024-06-03
Attending: INTERNAL MEDICINE
Payer: COMMERCIAL

## 2024-06-03 ENCOUNTER — APPOINTMENT (OUTPATIENT)
Dept: HEMATOLOGY/ONCOLOGY | Age: 54
End: 2024-06-03
Attending: INTERNAL MEDICINE
Payer: COMMERCIAL

## 2024-06-03 VITALS
HEIGHT: 73.82 IN | RESPIRATION RATE: 16 BRPM | HEART RATE: 61 BPM | OXYGEN SATURATION: 99 % | TEMPERATURE: 97 F | BODY MASS INDEX: 21.5 KG/M2 | SYSTOLIC BLOOD PRESSURE: 168 MMHG | DIASTOLIC BLOOD PRESSURE: 99 MMHG | WEIGHT: 167.5 LBS

## 2024-06-03 DIAGNOSIS — J86.0: ICD-10-CM

## 2024-06-03 DIAGNOSIS — C78.89 METASTATIC ADENOCARCINOMA TO PANCREAS (HCC): Primary | ICD-10-CM

## 2024-06-03 DIAGNOSIS — C15.5 MALIGNANT NEOPLASM OF LOWER THIRD OF ESOPHAGUS (HCC): Primary | ICD-10-CM

## 2024-06-03 LAB
ALBUMIN SERPL-MCNC: 2.5 G/DL (ref 3.4–5)
ALBUMIN/GLOB SERPL: 0.6 {RATIO} (ref 1–2)
ALP LIVER SERPL-CCNC: 122 U/L
ALT SERPL-CCNC: 25 U/L
ANION GAP SERPL CALC-SCNC: 5 MMOL/L (ref 0–18)
AST SERPL-CCNC: 20 U/L (ref 15–37)
BASOPHILS # BLD AUTO: 0.03 X10(3) UL (ref 0–0.2)
BASOPHILS NFR BLD AUTO: 0.4 %
BILIRUB SERPL-MCNC: 0.2 MG/DL (ref 0.1–2)
BUN BLD-MCNC: 11 MG/DL (ref 9–23)
CALCIUM BLD-MCNC: 8.5 MG/DL (ref 8.5–10.1)
CANCER AG19-9 SERPL-ACNC: 7541.2 U/ML (ref ?–37)
CHLORIDE SERPL-SCNC: 104 MMOL/L (ref 98–112)
CO2 SERPL-SCNC: 27 MMOL/L (ref 21–32)
CREAT BLD-MCNC: 0.58 MG/DL
EGFRCR SERPLBLD CKD-EPI 2021: 116 ML/MIN/1.73M2 (ref 60–?)
EOSINOPHIL # BLD AUTO: 0.15 X10(3) UL (ref 0–0.7)
EOSINOPHIL NFR BLD AUTO: 2.2 %
ERYTHROCYTE [DISTWIDTH] IN BLOOD BY AUTOMATED COUNT: 15 %
GLOBULIN PLAS-MCNC: 3.9 G/DL (ref 2.8–4.4)
GLUCOSE BLD-MCNC: 230 MG/DL (ref 70–99)
HCT VFR BLD AUTO: 36.4 %
HGB BLD-MCNC: 11.3 G/DL
IMM GRANULOCYTES # BLD AUTO: 0.05 X10(3) UL (ref 0–1)
IMM GRANULOCYTES NFR BLD: 0.7 %
LYMPHOCYTES # BLD AUTO: 0.71 X10(3) UL (ref 1–4)
LYMPHOCYTES NFR BLD AUTO: 10.5 %
MCH RBC QN AUTO: 29.4 PG (ref 26–34)
MCHC RBC AUTO-ENTMCNC: 31 G/DL (ref 31–37)
MCV RBC AUTO: 94.5 FL
MONOCYTES # BLD AUTO: 0.42 X10(3) UL (ref 0.1–1)
MONOCYTES NFR BLD AUTO: 6.2 %
NEUTROPHILS # BLD AUTO: 5.4 X10 (3) UL (ref 1.5–7.7)
NEUTROPHILS # BLD AUTO: 5.4 X10(3) UL (ref 1.5–7.7)
NEUTROPHILS NFR BLD AUTO: 80 %
OSMOLALITY SERPL CALC.SUM OF ELEC: 289 MOSM/KG (ref 275–295)
PLATELET # BLD AUTO: 192 10(3)UL (ref 150–450)
POTASSIUM SERPL-SCNC: 3.5 MMOL/L (ref 3.5–5.1)
PROT SERPL-MCNC: 6.4 G/DL (ref 6.4–8.2)
RBC # BLD AUTO: 3.85 X10(6)UL
SODIUM SERPL-SCNC: 136 MMOL/L (ref 136–145)
WBC # BLD AUTO: 6.8 X10(3) UL (ref 4–11)

## 2024-06-03 PROCEDURE — 85025 COMPLETE CBC W/AUTO DIFF WBC: CPT

## 2024-06-03 PROCEDURE — 80053 COMPREHEN METABOLIC PANEL: CPT

## 2024-06-03 PROCEDURE — 86301 IMMUNOASSAY TUMOR CA 19-9: CPT

## 2024-06-03 PROCEDURE — 99215 OFFICE O/P EST HI 40 MIN: CPT | Performed by: INTERNAL MEDICINE

## 2024-06-03 PROCEDURE — 36591 DRAW BLOOD OFF VENOUS DEVICE: CPT

## 2024-06-03 RX ORDER — LANSOPRAZOLE
30 KIT DAILY
Qty: 300 ML | Refills: 0 | Status: SHIPPED | OUTPATIENT
Start: 2024-06-03

## 2024-06-03 RX ORDER — HYDROCODONE BITARTRATE AND ACETAMINOPHEN 5; 325 MG/1; MG/1
1 TABLET ORAL EVERY 4 HOURS PRN
Qty: 40 TABLET | Refills: 0 | Status: SHIPPED | OUTPATIENT
Start: 2024-06-03

## 2024-06-03 RX ORDER — FLUOROURACIL 50 MG/ML
2400 INJECTION, SOLUTION INTRAVENOUS CONTINUOUS
OUTPATIENT
Start: 2024-06-10

## 2024-06-03 RX ORDER — BACLOFEN 10 MG/1
10 TABLET ORAL 3 TIMES DAILY PRN
Qty: 270 TABLET | Refills: 0 | OUTPATIENT
Start: 2024-06-03

## 2024-06-03 RX ORDER — LANSOPRAZOLE
30 KIT DAILY
COMMUNITY
Start: 2024-05-07 | End: 2024-06-03

## 2024-06-03 RX ORDER — ONDANSETRON 4 MG/1
4 TABLET, ORALLY DISINTEGRATING ORAL EVERY 8 HOURS PRN
Qty: 30 TABLET | Refills: 0 | Status: SHIPPED | OUTPATIENT
Start: 2024-06-03

## 2024-06-03 RX ORDER — BACLOFEN 10 MG/1
10 TABLET ORAL 3 TIMES DAILY PRN
Qty: 90 TABLET | Refills: 0 | Status: SHIPPED | OUTPATIENT
Start: 2024-06-03

## 2024-06-03 RX ORDER — ONDANSETRON 4 MG/1
4 TABLET, ORALLY DISINTEGRATING ORAL
COMMUNITY
Start: 2024-04-06 | End: 2024-06-03

## 2024-06-03 NOTE — PROGRESS NOTES
[facility] Oncology Nutrition F/U Consultation     Patient Name: Dontae Cortez Jr.  YOB: 1970  Medical Record Number: [id number]            Account Number: [id number]  Dietitian: Kayla Hagen RD, LDN     Date of visit: 6/3/2024     Diet Rx:   Soft as tolerated  6.5 cartons FS Vital 1.5 from [facility] (as per conversation w/ Jessica RD on 5/6/24) via J-tube (non-enfit) - pt no longer using; PERT of Relizorb cartridges     Pertinent Dx/PMH: Esophageal cancer; pancreatic (head) cancer       Past Medical History       Past Medical History:    Back problem    Belching    Black stools    Borderline diabetes     Dx in 8/2013 - HgA1C 6.2%    C. difficile diarrhea     pt treated and without symptoms    Chest pain    Coronary artery disease     On 8/16/13: CABG x 4 with LIMA to LAD and SVG to diagonal, OM and PDA    Decorative tattoo    Depression    Difficult intubation     h/o esophagectomy for CA; developed esophagobronchial vistula    Disorder of liver     LIVER CA    Esophageal cancer (HCC)     completed chemo    Esophageal reflux    Essential hypertension    Exposure to medical diagnostic radiation     last tx 8/18/2022    Frequent urination    Gastroparesis    Heartburn    High blood pressure    High cholesterol     Found when I had quadruple bypass    History of COVID-19     asymptomatic - pt was dx during a hospitalization for another diagnosis. No continued symptoms    History of stomach ulcers    Hyperlipidemia    Hyperlipidemia LDL goal < 70    Indigestion    Morbid obesity with BMI of 40.0-44.9, adult (HCC)    Muscle weakness    Nausea vomiting and diarrhea    Nontoxic multinodular goiter     Dx in 8/2013: pt was told that imaging showed thyroid cysts per PCP    Pancreatic cancer (HCC)     last dose 12/4/2023 is scheduled for another round 12/27/23    Peripheral vascular disease (HCC)     pt denies    Personal history of antineoplastic chemotherapy     for esophageal cancer/completed     Personal history of antineoplastic chemotherapy     pancreatic cancer    Personal history of antineoplastic chemotherapy     Last treatment 3/12/24    Problems with swallowing    Pulmonary nodules     Dx in 8/2013: CT chest showed small bilateral fissural-based lung nodules less than 1 cm    S/P CABG x 4     On 8/16/13: CABG x 4 with LIMA to LAD and SVG to diagonal, OM and PDA    Shortness of breath     when coughing; no oxygen    Vomiting            TX: PENDING - plan on resuming FOLFOX (oxaliplain DR 75 mg/m2) + Herceptin + Opdivo q2 weeks. He will start next week.      Other pertinent subjective/objective information: recent diet/sx hx obtained; 5/15/24 PET noted; 5/1/24: Latissimus dorsi flap reconstruction for bronchoesophageal fistula at [place]; other medical hx documented in prior notes      Pertinent Meds:    Medications - Current      Current Outpatient Medications:     oxyCODONE 5 MG Oral Tab, Take 1 tablet (5 mg total) by mouth every 4 (four) hours as needed for Pain., Disp: 40 tablet, Rfl: 0    SERTRALINE 100 MG Oral Tab, TAKE 1.5 TABLETS (150MG TOTAL) BY MOUTH DAILY, Disp: 135 tablet, Rfl: 1    albuterol (2.5 MG/3ML) 0.083% Inhalation Nebu Soln, Take 3 mL (2.5 mg total) by nebulization 3 (three) times daily., Disp: 90 each, Rfl: 0    HYDROcodone-acetaminophen 5-325 MG Oral Tab, Take 1 tablet by mouth every 4 (four) hours as needed for Pain., Disp: 20 tablet, Rfl: 0    metoprolol tartrate 25 MG Oral Tab, Take 1 tablet (25 mg total) by mouth 2x Daily(Beta Blocker)., Disp: 60 tablet, Rfl: 1    morphINE 10 MG/5ML Oral Solution, 5 mL (10 mg total) by Per G Tube route every 6 (six) hours., Disp: 300 mL, Rfl: 0    baclofen 10 MG Oral Tab, Take 1 tablet (10 mg total) by mouth 3 (three) times daily as needed., Disp: , Rfl:     OLANZapine 5 MG Oral Tab, Take 1 tablet (5 mg total) by mouth nightly., Disp: 30 tablet, Rfl: 3    metoclopramide (REGLAN) 10 MG Oral Tab, Take 1 tablet (10 mg total) by mouth 4 (four)  times daily before meals and nightly., Disp: 120 tablet, Rfl: 2    Pancrelipase, Lip-Prot-Amyl, (CREON) 22179-52343 units Oral Cap DR Particles, Take 2 capsules with meals and 1 capsule with snacks, Disp: 240 capsule, Rfl: 0    gabapentin 300 MG Oral Cap, Take 1 capsule (300 mg total) by mouth in the morning and 1 capsule (300 mg total) before bedtime., Disp: 180 capsule, Rfl: 0    losartan 50 MG Oral Tab, Take 1 tablet (50 mg total) by mouth daily., Disp: 90 tablet, Rfl: 2    Omeprazole 40 MG Oral Capsule Delayed Release, Take 1 capsule (40 mg total) by mouth 2 (two) times daily before meals., Disp: 90 capsule, Rfl: 3        Height:  6'1.82\"                    IBW: 190 +/- 10%     WT HX:   05/24/24: ~ 164 lbs per pt     06/03/24 76 kg (167 lb  8 oz)   05/15/24 70.3 kg (155 lb)   04/24/24 67.2 kg (148 lb 3.2 oz)   04/17/24 68.9 kg (152 lb)   04/08/24 68.4 kg (150 lb 14.4 oz)   03/25/24 78.7 kg (173 lb 8 oz)   03/20/24 77.1 kg (170 lb)   03/12/24 82.6 kg (182 lb)         Estimated Nutrition Needs: 30-32 kcals/kg = 5125-7285  KCALS/d as per stated wt; 1.5 gms protein/kg = 112 gms/d     Assessment/Plan:  [facility] noting he well resume tx next week. He is having jejunostomy tube removed on 6/7/24 as he is no longer using.     Wt gain as noted.      RD offered support and will f/u w/ pt next week.        The 21st Century Cures Act makes medical notes like these available to patients in the interest of transparency. Please be advised this is a medical document. Medical documents are intended to carry relevant information, facts as evident, and the clinical opinion of the practitioner. The medical note is intended as peer to peer communication and may appear blunt or direct. It is written in medical language and may contain abbreviations or verbiage that are unfamiliar.

## 2024-06-03 NOTE — PROGRESS NOTES
Patient here for follow-up and planned C19D1 treatment. Energy levels are poor. States his pain is at an 8-9/10. Will have J tube removed with GI this week. Recently at [facility] this past weekend for abscess drain to right lower back. Denies any fevers. Still having some nausea and vomiting.

## 2024-06-03 NOTE — PROGRESS NOTES
No treatment today per Dr Foster. To come back next week for possible treatment. Patient also needs to see Ana Luisa GOMEZ. Patient wishes to come on Tuesday to have everything done on the same day. Will get appt from [facility]. Discharged in stable condition.

## 2024-06-07 PROBLEM — K86.81 EXOCRINE PANCREATIC INSUFFICIENCY (HCC): Status: ACTIVE | Noted: 2024-05-07

## 2024-06-07 PROBLEM — E78.1 HYPERTRIGLYCERIDEMIA: Status: ACTIVE | Noted: 2024-01-01

## 2024-06-07 PROBLEM — G89.18 ACUTE POSTOPERATIVE PAIN: Status: ACTIVE | Noted: 2024-06-07

## 2024-06-07 PROBLEM — E78.1 HYPERTRIGLYCERIDEMIA: Status: ACTIVE | Noted: 2024-06-07

## 2024-06-07 PROBLEM — G89.18 ACUTE POSTOPERATIVE PAIN: Status: ACTIVE | Noted: 2024-01-01

## 2024-06-07 PROBLEM — K86.81 EXOCRINE PANCREATIC INSUFFICIENCY (HCC): Status: ACTIVE | Noted: 2024-01-01

## 2024-06-10 NOTE — PROGRESS NOTES
Cancer Center Progress Note    Patient Name: Dontae Cortez Jr.   YOB: 1970   Medical Record Number: [id number]   CSN: [id number]   Date of visit: 6/11/2024       Chief Complaint/Reason for Visit:  Chief Complaint   Patient presents with    Follow - Up    Chemotherapy      Oncology History   -2018: Per report had a normal EGD and colonoscopy     -June 2022: He met with GI due to new onset dysphagia which started about 1 month prior to his visit.  He had an esophagram with a focal abrupt narrowing with irregular margins in the distal one third of the esophagus extending to the GE junction.  He also had an episode of food impaction. He has also lost about 15 lb. He was a heavy smoker from age 14 to about 40 (1.5 PPD). Also with alcohol use currently. Mother with a history of breast and colon cancer.  EGD and EUS with Dr. Yadav on 6/7/2022: Severe esophagitis with a concerning mass extending 37-39 centimeters from the incisors.  Nonobstructive mass.  By EUS uT3N1 disease.  Pathology showed invasive moderate to poorly differentiated adenocarcinoma.  HER2 amplified by FISH  .CT CAP done at [facility] on 6/16/22: Results not loaded to PACs yet.  CA 19-9 from the same day was 107.4.  CEA normal. PET/CT on 6/20/2022: Increased distal esophageal uptake with an SUV of 14.4.  Concern for shreya metastases.     -Concurrent chemoradiation with carboplatin AUC2 plus paclitaxel 50 mg/m2: July 2022-August 2022 with  down (280-->30). Post treatment PET/CT showed improvement.      -Surgery with Dr. Lu on 9/30/22: ypT1b pN0. Recovery has been complicated by an esophageal leak,      -I met with him on 12/12/22. We discussed adjuvant opdivo for 1 year. His  was elevated to 48 and repeat was 64. CT CAP was recommended.      -He was admitted on 12/25/22 for abdominal pain. He had a POEM procedure done. He was also noted to have a RLL consolidation. He was seen by pulmonary, based on imaging an  outpatient PET/CT was recommended and a biopsy of the most FDG avid lesion would be considered. Noted to have CAD and an outpatient evaluation was recommended. Outpatient PET/CT done on 1/4/23 was reviewed in tumor board and there was no focal area of concern and adjuvant immunotherapy recommended.      -Adjuvant opdivo:01/2023-03/2023. PET/CT in in 04/2023 showed uptake in the pancreatic head and tail. Also with some uptake in liver. EGD/EUS on 4/27/23 with Dr. Ritchie: Pancreatic head mass positive for adenocarcinoma: Comparison to previous esophageal cancer shows similarity but IHC in non-specific. Her 2 IHC was 2+ on this specimen but FISH was negative.      -Chemo + Herceptin + Immunotherapy: He received 7 cycles of FOLFOX + Herceptin + Immunotherapy (05/2023-09/2023). Imaging after C5 showed a favorable response. After C7, we held oxaliplatin due to neuropathy and he was started on maintenance herceptin + IO maintenance. Signatera was negative 09/11/23.     -Herceptin + IO Maintenance: He received 3 cycles from 09/25/23-10/31/23. Treatment was delayed due to hospitalizations.      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 Due to new findings-restarting FOLFOX discussed.      -admitted from 1/8/24-1/13/24 for persistent dysphagia and bronchesophageal stent. Plan is for an ASD occluder and removal of the bronchial stent as an outpatient.      -1/17/24: EGD with fistula closure with ASD occluder and removal of the bronchial stent.     -FOLFOX + Herceptin + Opdivo restarted: 3 cycles: 02/2024-03/2024     -Treatment delayed again for recurrent hospital admissions     -He underwent a  Latissimus dorsi flap reconstruction for his bronchoesophageal fistula on 5/1/24     -PET/CT 5/15/24: at least 3 areas of uptake in the right hepatic lobe, uptake in pancrease and retroperitoneum. Post-operative uptake in chest wall.      -J tube removed by Dr. Ritchie 6/7/24    - Restarted FOLFOX + Herceptin + Opdivo 6/11/24      History of Present Illness:   Dontae Cortez Jr. Is a 53 year old patient of Dr. Crespo with metastatic esophageal cancer as above. His last treatment was in March due to hospitalizations and procedures. He presents today to restart chemotherapy with FOLFOX + herceptin + opdivo. He is feeling well, his energy has been good. He reports improvement in pain, using norco as needed. He reports heart burn that is waking him up over night. His appetite has been well, eating 3 meals per day with snacks in between. Pushing fluids. Jtube was removed. No dysphagia. He continues to have intermittent nausea, not using olanzapine regularly. No vomiting.   Denies any fevers s/s of infections, no sob or cp. No bleeding concerns     Problem List:  Patient Active Problem List   Diagnosis    Primary hypertension    Hyperlipidemia with target low density lipoprotein (LDL) cholesterol less than 70 mg/dL    Coronary artery disease involving coronary bypass graft of native heart with angina pectoris (HCC)    S/P CABG x 4    Nontoxic multinodular goiter    Pulmonary nodules    Thrombophlebitis leg    BRANDAN (generalized anxiety disorder)    Right shoulder Arthroscopy acromioplasty ,distal  clavicle resection, rotator cuff/labral debridment  Global 05/13/2021    Right bicipital tenosynovitis    Malignant neoplasm of esophagus (HCC)    Pleural effusion    Esophageal anastomotic leak    Esophageal obstruction    On total parenteral nutrition (TPN)    Mechanical complication of esophagostomy (HCC)    Abdominal pain of unknown etiology    Migration of esophageal stent    Gastroparesis    Esophageal carcinoma  (HCC)    Normocytic anemia    Esophageal fistula    Subacute cough    Acquired bronchoesophageal fistula (HCC)    Pneumonia of both lower lobes due to infectious organism    Malignant neoplasm of pancreas (HCC)    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 difficile colitis    Dysphagia, unspecified type    Dyspnea and respiratory abnormalities    Hypokalemia    Dysphagia    Narcotic drug use    History of esophageal cancer    Palliative care by specialist    Neoplasm related pain    Endobronchial mass    Hyponatremia    Thrombocytopenia (HCC)    Acute kidney injury (HCC)    Hyperglycemia    Aspiration pneumonitis (HCC)    C. difficile diarrhea    Aspiration pneumonia (HCC)    Malignant neoplasm metastatic to liver (HCC)    Hyperlipidemia    Nausea vomiting and diarrhea    Fistula, bronchoesophageal (HCC)    Iron deficiency anemia, unspecified iron deficiency anemia type    Azotemia    Palliative care encounter    Goals of care, counseling/discussion    Cancer related pain    Anemia    Fistula    Acute cough    Pneumonia of right lung due to infectious organism, unspecified part of lung    Bacteremia    Acute postoperative pain    Exocrine pancreatic insufficiency (HCC)    Hypertriglyceridemia        Medical History:  Past Medical History:    Back problem    Belching    Black stools    Borderline diabetes    Dx in 8/2013 - HgA1C 6.2%    C. difficile diarrhea    pt treated and without symptoms    Chest pain    Coronary artery disease    On 8/16/13: CABG x 4 with LIMA to LAD and SVG to diagonal, OM and PDA    Decorative tattoo    Depression    Difficult intubation    h/o esophagectomy for CA; developed  esophagobronchial vistula    Disorder of liver    LIVER CA    Esophageal cancer (HCC)    completed chemo    Esophageal reflux    Essential hypertension    Exposure to medical diagnostic radiation    last tx 8/18/2022    Frequent urination    Gastroparesis    Heartburn    High blood pressure    High cholesterol    Found when I had quadruple bypass    History of COVID-19    asymptomatic - pt was dx during a hospitalization for another diagnosis. No continued symptoms    History of stomach ulcers    Hyperlipidemia    Hyperlipidemia LDL goal < 70    Indigestion    Morbid obesity with BMI of 40.0-44.9, adult (HCC)    Muscle weakness    Nausea vomiting and diarrhea    Nontoxic multinodular goiter    Dx in 8/2013: pt was told that imaging showed thyroid cysts per PCP    Pancreatic cancer (HCC)    last dose 12/4/2023 is scheduled for another round 12/27/23    Peripheral vascular disease (HCC)    pt denies    Personal history of antineoplastic chemotherapy    for esophageal cancer/completed    Personal history of antineoplastic chemotherapy    pancreatic cancer    Personal history of antineoplastic chemotherapy    Last treatment 3/12/24    Problems with swallowing    Pulmonary nodules    Dx in 8/2013: CT chest showed small bilateral fissural-based lung nodules less than 1 cm    S/P CABG x 4    On 8/16/13: CABG x 4 with LIMA to LAD and SVG to diagonal, OM and PDA    Shortness of breath    when coughing; no oxygen    Vomiting       Surgical History:  Past Surgical History:   Procedure Laterality Date    Appendectomy      Appendectomy      Arthroscopy of joint unlisted      right shoulder    Cabg      On 8/16/13: CABG x 4 with LIMA to LAD and SVG to diagonal, OM and PDA    Cardiac cath lab      On 8/14/2013: cardiac cath showed 3-vessel disease    Other surgical history      1.       Laparoscopic robotic-assisted esophagogastrectomy.    Port, indwelling, imp         Allergies:  Allergies   Allergen Reactions    Oxaliplatin  HIVES     Shaking        Family History:  Family History   Problem Relation Age of Onset    Cancer Mother         breast and colon     Diabetes Neg        Social History:  Social History     Socioeconomic History    Marital status:      Spouse name: Not on file    Number of children: 3    Years of education: Not on file    Highest education level: Not on file   Occupational History    Occupation: works as  - on workman's comp   Tobacco Use    Smoking status: Former     Current packs/day: 0.00     Average packs/day: 1 pack/day for 27.0 years (27.0 ttl pk-yrs)     Types: Cigarettes     Start date: 8/15/1986     Quit date: 8/15/2013     Years since quitting: 10.8    Smokeless tobacco: Never    Tobacco comments:     Quit smoking 2013   Vaping Use    Vaping status: Never Used   Substance and Sexual Activity    Alcohol use: Never    Drug use: Never    Sexual activity: Not Currently     Partners: Female   Other Topics Concern     Service Not Asked    Blood Transfusions Not Asked    Caffeine Concern Yes    Occupational Exposure Not Asked    Hobby Hazards Not Asked    Sleep Concern Not Asked    Stress Concern No    Weight Concern No    Special Diet No    Back Care Not Asked    Exercise No    Bike Helmet Not Asked    Seat Belt No    Self-Exams Not Asked   Social History Narrative    Lives with life partner    Has 3 daughters - 1 lives closeby, 1 in [place], 1 in [place]     Social Determinants of Health     Financial Resource Strain: Low Risk  (5/31/2024)    Received from [facility]    Overall Financial Resource Strain (CARDIA)     Difficulty of Paying Living Expenses: Not hard at all   Food Insecurity: No Food Insecurity (5/31/2024)    Received from [facility]    Hunger Vital Sign     Worried About Running Out of Food in the Last Year: Never true     Ran Out of Food in the Last Year: Never true   Transportation Needs: No Transportation Needs (5/31/2024)     Received from [facility]    PRAPARE - Transportation     Lack of Transportation (Medical): No     Lack of Transportation (Non-Medical): No   Physical Activity: Not on file   Stress: Not on file   Social Connections: Not on file   Housing Stability: Low Risk  (5/31/2024)    Received from [facility]    Housing Stability Vital Sign     Unable to Pay for Housing in the Last Year: No     Number of Places Lived in the Last Year: 0     In the last 12 months, was there a time when you did not have a steady place to sleep or slept in a shelter (including now)?: No       Medications:    Current Outpatient Medications:     pantoprazole 40 MG Oral Tab EC, Take 1 tablet (40 mg total) by mouth every morning before breakfast., Disp: 30 tablet, Rfl: 0    prochlorperazine (COMPAZINE) 10 mg tablet, Take 1 tablet (10 mg total) by mouth every 6 (six) hours as needed for Nausea., Disp: 30 tablet, Rfl: 3    ondansetron 4 MG Oral Tablet Dispersible, Take 1 tablet (4 mg total) by mouth every 8 (eight) hours as needed for Nausea., Disp: 30 tablet, Rfl: 0    SERTRALINE 100 MG Oral Tab, TAKE 1.5 TABLETS (150MG TOTAL) BY MOUTH DAILY, Disp: 135 tablet, Rfl: 1    metoprolol tartrate 25 MG Oral Tab, Take 1 tablet (25 mg total) by mouth 2x Daily(Beta Blocker)., Disp: 60 tablet, Rfl: 1    Pancrelipase, Lip-Prot-Amyl, (CREON) 53816-45779 units Oral Cap DR Particles, Take 2 capsules with meals and 1 capsule with snacks, Disp: 240 capsule, Rfl: 0    losartan 50 MG Oral Tab, Take 1 tablet (50 mg total) by mouth daily., Disp: 90 tablet, Rfl: 2    OLANZAPINE 5 MG Oral Tab, TAKE 1 TABLET BY MOUTH EVERY DAY AT NIGHT, Disp: 90 tablet, Rfl: 1    dicyclomine 10 MG Oral Cap, Take 1 capsule (10 mg total) by mouth 4 (four) times daily before meals and nightly., Disp: 90 capsule, Rfl: 0    oxyCODONE 5 MG Oral Tab, Take 1-2 tablets (5-10 mg total) by mouth every 4 (four) hours as needed for Pain., Disp: 90 tablet, Rfl:  0    gabapentin 100 MG Oral Cap, Take 1 capsule (100 mg total) by mouth nightly., Disp: 30 capsule, Rfl: 0    Review of Systems:  A comprehensive 14 point review of systems was completed.  Pertinent positives and negatives noted in the HPI.    Performance Status: ECOG 1 - No physically strenuous activity, but ambulatory and able to carry out light or sedentary work (e.g. office work, light house work).    Physical Examination:  Vital Signs: Height: --  Weight: --  BSA (Calculated - sq m): --  Pulse: --  BP: --  Temp: --  Do Not Use - Resp Rate: --  SpO2: --    General: Patient is alert and oriented x 3, not in acute distress.  HEENT: Anicteric, conjunctivae and sclerae clear, no oropharyngeal lesion/thrush, mucous membranes are moist   Chest: Clear to auscultation. Respirations unlabored.   Heart: Regular rate and rhythm.   Abdomen: Soft, non-distended, non-tender with present bowel sounds.  Extremities: No edema.  Neurological: Grossly intact.   Skin: warm, dry, no erythema or rash   Psych/Depression: mood and affect are appropriate.     Labs:     Recent Results (from the past 72 hour(s))   COMP METABOLIC PANEL [E]    Collection Time: 06/11/24  8:00 AM   Result Value Ref Range    Glucose 140 (H) 70 - 99 mg/dL    Sodium 141 136 - 145 mmol/L    Potassium 3.6 3.5 - 5.1 mmol/L    Chloride 109 98 - 112 mmol/L    CO2 27.0 21.0 - 32.0 mmol/L    Anion Gap 5 0 - 18 mmol/L    BUN 10 9 - 23 mg/dL    Creatinine 0.65 (L) 0.70 - 1.30 mg/dL    Calcium, Total 8.2 (L) 8.5 - 10.1 mg/dL    Calculated Osmolality 293 275 - 295 mOsm/kg    eGFR-Cr 112 >=60 mL/min/1.73m2    AST 19 15 - 37 U/L    ALT 22 16 - 61 U/L    Alkaline Phosphatase 136 (H) 45 - 117 U/L    Bilirubin, Total 0.2 0.1 - 2.0 mg/dL    Total Protein 6.2 (L) 6.4 - 8.2 g/dL    Albumin 2.5 (L) 3.4 - 5.0 g/dL    Globulin  3.7 2.8 - 4.4 g/dL    A/G Ratio 0.7 (L) 1.0 - 2.0    Patient Fasting for CMP? Patient not present    CARBOHYDRATE ANTIGEN 19-9 [E]    Collection Time:  06/11/24  8:00 AM   Result Value Ref Range    Carbohydrate Ag 19-9 7,727.6 (H) <=37.0 U/mL   CBC W/ DIFFERENTIAL    Collection Time: 06/11/24  8:00 AM   Result Value Ref Range    WBC 7.4 4.0 - 11.0 x10(3) uL    RBC 3.74 (L) 4.30 - 5.70 x10(6)uL    HGB 11.1 (L) 13.0 - 17.5 g/dL    HCT 35.7 (L) 39.0 - 53.0 %    .0 150.0 - 450.0 10(3)uL    MCV 95.5 80.0 - 100.0 fL    MCH 29.7 26.0 - 34.0 pg    MCHC 31.1 31.0 - 37.0 g/dL    RDW 15.0 %    Neutrophil Absolute Prelim 5.72 1.50 - 7.70 x10 (3) uL    Neutrophil Absolute 5.72 1.50 - 7.70 x10(3) uL    Lymphocyte Absolute 0.69 (L) 1.00 - 4.00 x10(3) uL    Monocyte Absolute 0.67 0.10 - 1.00 x10(3) uL    Eosinophil Absolute 0.17 0.00 - 0.70 x10(3) uL    Basophil Absolute 0.02 0.00 - 0.20 x10(3) uL    Immature Granulocyte Absolute 0.11 0.00 - 1.00 x10(3) uL    Neutrophil % 77.5 %    Lymphocyte % 9.3 %    Monocyte % 9.1 %    Eosinophil % 2.3 %    Basophil % 0.3 %    Immature Granulocyte % 1.5 %       Impression/Plan    Metastatic esophageal cancer:   Previously on FOLFOX + immuno + herceptin. Unfortunately progressed while on maintenance immuno and herceptin. Treatment has been on hold due to hospitalizations and procedures.   Restart treatment with FOLFOX + herceptin + opdivo today.  oxaliplatin to 75mg/m2.   Plan to repeat PET scan after 4 cycles  Labs reviewed, Will proceed with treatment as planned.   ----Pt noted to have infusion reaction (hives, rash, and flushing) while receiving oxaliplatin on 3/25 (his last treatment).  Will premedicate him today with benadryl 25mg, pepcid 20mg, and solumedrol 125mg.    He was able to tolerate infusion without complications.     Bronch-Esophageal fistula:   s/p bronchial stent removal ASD occluder. Now s/p repair 5/1/24.     Cancer related pain:  Controlled with norco, will see palliative care today for further management.     GERD/Heartburn:  Waking him up at night, bothersome.   Start pantoprazole   Can use tums or pepcid if  needed  Discussed foods to avoid prior to bedtime, sit up after eating.     Planned Follow Up: 2 weeks    Emotional Well Being:  I have assessed the patient's emotional well-being and any concerns about anxiety or depression.  We discussed issues of distress, coping difficulties and social support systems and currently there are no active problems.    Risk Level: HIGH, esophageal cancer, receiving chemotherapy requiring close monitoring     The 21st Century Cures Act makes medical notes like these available to patients in the interest of transparency. Please be advised this is a medical document. Medical documents are intended to carry relevant information, facts as evident, and the clinical opinion of the practitioner. The medical note is intended as peer to peer communication and may appear blunt or direct. It is written in medical language and may contain abbreviations or verbiage that are unfamiliar.     Electronically Signed by:    MIO Hong-BC  Nurse Practitioner  [facility]

## 2024-06-11 ENCOUNTER — HOSPITAL ENCOUNTER (OUTPATIENT)
Dept: CV DIAGNOSTICS | Age: 54
Discharge: HOME | End: 2024-06-11
Attending: INTERNAL MEDICINE
Payer: COMMERCIAL

## 2024-06-11 ENCOUNTER — OFFICE VISIT (OUTPATIENT)
Dept: HEMATOLOGY/ONCOLOGY | Age: 54
End: 2024-06-11
Attending: INTERNAL MEDICINE
Payer: COMMERCIAL

## 2024-06-11 ENCOUNTER — SOCIAL WORK SERVICES (OUTPATIENT)
Dept: HEMATOLOGY/ONCOLOGY | Facility: HOSPITAL | Age: 54
End: 2024-06-11

## 2024-06-11 VITALS
HEIGHT: 73.82 IN | TEMPERATURE: 97 F | WEIGHT: 173 LBS | SYSTOLIC BLOOD PRESSURE: 168 MMHG | BODY MASS INDEX: 22.2 KG/M2 | OXYGEN SATURATION: 97 % | RESPIRATION RATE: 16 BRPM | HEART RATE: 78 BPM | DIASTOLIC BLOOD PRESSURE: 94 MMHG

## 2024-06-11 DIAGNOSIS — C78.89 METASTATIC ADENOCARCINOMA TO PANCREAS (HCC): ICD-10-CM

## 2024-06-11 DIAGNOSIS — R12 HEARTBURN: ICD-10-CM

## 2024-06-11 DIAGNOSIS — G89.3 NEOPLASM RELATED PAIN: Primary | ICD-10-CM

## 2024-06-11 DIAGNOSIS — G89.3 CANCER RELATED PAIN: ICD-10-CM

## 2024-06-11 DIAGNOSIS — C15.5 MALIGNANT NEOPLASM OF LOWER THIRD OF ESOPHAGUS (HCC): Primary | ICD-10-CM

## 2024-06-11 DIAGNOSIS — G89.3 NEOPLASM RELATED PAIN: ICD-10-CM

## 2024-06-11 DIAGNOSIS — Z51.5 PALLIATIVE CARE BY SPECIALIST: ICD-10-CM

## 2024-06-11 LAB
ALBUMIN SERPL-MCNC: 2.5 G/DL (ref 3.4–5)
ALBUMIN/GLOB SERPL: 0.7 {RATIO} (ref 1–2)
ALP LIVER SERPL-CCNC: 136 U/L
ALT SERPL-CCNC: 22 U/L
ANION GAP SERPL CALC-SCNC: 5 MMOL/L (ref 0–18)
AST SERPL-CCNC: 19 U/L (ref 15–37)
BASOPHILS # BLD AUTO: 0.02 X10(3) UL (ref 0–0.2)
BASOPHILS NFR BLD AUTO: 0.3 %
BILIRUB SERPL-MCNC: 0.2 MG/DL (ref 0.1–2)
BUN BLD-MCNC: 10 MG/DL (ref 9–23)
CALCIUM BLD-MCNC: 8.2 MG/DL (ref 8.5–10.1)
CANCER AG19-9 SERPL-ACNC: 7727.6 U/ML (ref ?–37)
CHLORIDE SERPL-SCNC: 109 MMOL/L (ref 98–112)
CO2 SERPL-SCNC: 27 MMOL/L (ref 21–32)
CREAT BLD-MCNC: 0.65 MG/DL
EGFRCR SERPLBLD CKD-EPI 2021: 112 ML/MIN/1.73M2 (ref 60–?)
EOSINOPHIL # BLD AUTO: 0.17 X10(3) UL (ref 0–0.7)
EOSINOPHIL NFR BLD AUTO: 2.3 %
ERYTHROCYTE [DISTWIDTH] IN BLOOD BY AUTOMATED COUNT: 15 %
GLOBULIN PLAS-MCNC: 3.7 G/DL (ref 2.8–4.4)
GLUCOSE BLD-MCNC: 140 MG/DL (ref 70–99)
HCT VFR BLD AUTO: 35.7 %
HGB BLD-MCNC: 11.1 G/DL
IMM GRANULOCYTES # BLD AUTO: 0.11 X10(3) UL (ref 0–1)
IMM GRANULOCYTES NFR BLD: 1.5 %
LYMPHOCYTES # BLD AUTO: 0.69 X10(3) UL (ref 1–4)
LYMPHOCYTES NFR BLD AUTO: 9.3 %
MCH RBC QN AUTO: 29.7 PG (ref 26–34)
MCHC RBC AUTO-ENTMCNC: 31.1 G/DL (ref 31–37)
MCV RBC AUTO: 95.5 FL
MONOCYTES # BLD AUTO: 0.67 X10(3) UL (ref 0.1–1)
MONOCYTES NFR BLD AUTO: 9.1 %
NEUTROPHILS # BLD AUTO: 5.72 X10 (3) UL (ref 1.5–7.7)
NEUTROPHILS # BLD AUTO: 5.72 X10(3) UL (ref 1.5–7.7)
NEUTROPHILS NFR BLD AUTO: 77.5 %
OSMOLALITY SERPL CALC.SUM OF ELEC: 293 MOSM/KG (ref 275–295)
PLATELET # BLD AUTO: 163 10(3)UL (ref 150–450)
POTASSIUM SERPL-SCNC: 3.6 MMOL/L (ref 3.5–5.1)
PROT SERPL-MCNC: 6.2 G/DL (ref 6.4–8.2)
RBC # BLD AUTO: 3.74 X10(6)UL
SODIUM SERPL-SCNC: 141 MMOL/L (ref 136–145)
WBC # BLD AUTO: 7.4 X10(3) UL (ref 4–11)

## 2024-06-11 PROCEDURE — 93306 TTE W/DOPPLER COMPLETE: CPT | Performed by: INTERNAL MEDICINE

## 2024-06-11 PROCEDURE — 96413 CHEMO IV INFUSION 1 HR: CPT

## 2024-06-11 PROCEDURE — 80053 COMPREHEN METABOLIC PANEL: CPT

## 2024-06-11 PROCEDURE — 85025 COMPLETE CBC W/AUTO DIFF WBC: CPT

## 2024-06-11 PROCEDURE — 96415 CHEMO IV INFUSION ADDL HR: CPT

## 2024-06-11 PROCEDURE — 99214 OFFICE O/P EST MOD 30 MIN: CPT | Performed by: NURSE PRACTITIONER

## 2024-06-11 PROCEDURE — 96417 CHEMO IV INFUS EACH ADDL SEQ: CPT

## 2024-06-11 PROCEDURE — 99215 OFFICE O/P EST HI 40 MIN: CPT | Performed by: NURSE PRACTITIONER

## 2024-06-11 PROCEDURE — 96375 TX/PRO/DX INJ NEW DRUG ADDON: CPT

## 2024-06-11 PROCEDURE — 96368 THER/DIAG CONCURRENT INF: CPT

## 2024-06-11 PROCEDURE — 86301 IMMUNOASSAY TUMOR CA 19-9: CPT

## 2024-06-11 PROCEDURE — S0028 INJECTION, FAMOTIDINE, 20 MG: HCPCS | Performed by: NURSE PRACTITIONER

## 2024-06-11 RX ORDER — DICYCLOMINE HYDROCHLORIDE 10 MG/1
10 CAPSULE ORAL
Qty: 90 CAPSULE | Refills: 0 | Status: SHIPPED | OUTPATIENT
Start: 2024-06-11

## 2024-06-11 RX ORDER — FLUOROURACIL 50 MG/ML
2400 INJECTION, SOLUTION INTRAVENOUS CONTINUOUS
Status: DISCONTINUED | OUTPATIENT
Start: 2024-06-11 | End: 2024-06-11

## 2024-06-11 RX ORDER — OXYCODONE HYDROCHLORIDE 5 MG/1
TABLET ORAL EVERY 4 HOURS PRN
Qty: 90 TABLET | Refills: 0 | Status: SHIPPED | OUTPATIENT
Start: 2024-06-11

## 2024-06-11 RX ORDER — PROCHLORPERAZINE MALEATE 10 MG
10 TABLET ORAL EVERY 6 HOURS PRN
Qty: 30 TABLET | Refills: 3 | Status: SHIPPED | OUTPATIENT
Start: 2024-06-11

## 2024-06-11 RX ORDER — DIPHENHYDRAMINE HYDROCHLORIDE 50 MG/ML
25 INJECTION INTRAMUSCULAR; INTRAVENOUS ONCE
Status: CANCELLED
Start: 2024-06-11 | End: 2024-06-11

## 2024-06-11 RX ORDER — GABAPENTIN 100 MG/1
100 CAPSULE ORAL NIGHTLY
Qty: 30 CAPSULE | Refills: 0 | Status: SHIPPED | OUTPATIENT
Start: 2024-06-11

## 2024-06-11 RX ORDER — METHYLPREDNISOLONE SODIUM SUCCINATE 125 MG/2ML
125 INJECTION, POWDER, LYOPHILIZED, FOR SOLUTION INTRAMUSCULAR; INTRAVENOUS ONCE
Status: CANCELLED
Start: 2024-06-11 | End: 2024-06-11

## 2024-06-11 RX ORDER — METHYLPREDNISOLONE SODIUM SUCCINATE 125 MG/2ML
125 INJECTION, POWDER, LYOPHILIZED, FOR SOLUTION INTRAMUSCULAR; INTRAVENOUS ONCE
Status: COMPLETED | OUTPATIENT
Start: 2024-06-11 | End: 2024-06-11

## 2024-06-11 RX ORDER — FAMOTIDINE 10 MG/ML
20 INJECTION, SOLUTION INTRAVENOUS 2 TIMES DAILY
Status: CANCELLED
Start: 2024-06-11

## 2024-06-11 RX ORDER — DIPHENHYDRAMINE HYDROCHLORIDE 50 MG/ML
25 INJECTION INTRAMUSCULAR; INTRAVENOUS ONCE
Status: COMPLETED | OUTPATIENT
Start: 2024-06-11 | End: 2024-06-11

## 2024-06-11 RX ORDER — PANTOPRAZOLE SODIUM 40 MG/1
40 TABLET, DELAYED RELEASE ORAL
Qty: 30 TABLET | Refills: 0 | Status: SHIPPED | OUTPATIENT
Start: 2024-06-11

## 2024-06-11 RX ORDER — OLANZAPINE 5 MG/1
5 TABLET ORAL NIGHTLY
Qty: 90 TABLET | Refills: 1 | Status: SHIPPED | OUTPATIENT
Start: 2024-06-11 | End: 2024-08-02

## 2024-06-11 RX ORDER — FAMOTIDINE 10 MG/ML
20 INJECTION, SOLUTION INTRAVENOUS 2 TIMES DAILY
Status: DISCONTINUED | OUTPATIENT
Start: 2024-06-11 | End: 2024-06-11

## 2024-06-11 RX ADMIN — FLUOROURACIL 5000 MG: 50 INJECTION, SOLUTION INTRAVENOUS at 13:17:00

## 2024-06-11 RX ADMIN — FAMOTIDINE 20 MG: 10 INJECTION, SOLUTION INTRAVENOUS at 10:23:00

## 2024-06-11 RX ADMIN — DIPHENHYDRAMINE HYDROCHLORIDE 25 MG: 50 INJECTION INTRAMUSCULAR; INTRAVENOUS at 10:25:00

## 2024-06-11 RX ADMIN — METHYLPREDNISOLONE SODIUM SUCCINATE 125 MG: 125 INJECTION, POWDER, LYOPHILIZED, FOR SOLUTION INTRAMUSCULAR; INTRAVENOUS at 10:21:00

## 2024-06-11 NOTE — PROGRESS NOTES
SW met with pt to provide support and encouragement. Pt shared recent treatment update and how he is handing it all.    Pt states that [employer] needs an updated from previously sent.  SW revised and sent form via fax to [phone]. Pt is requesting extension through 12/31/24.  SW explained that forms will be done through the Form Dept starting now.  SW provided contact form explaining the process.    SW provided a list of financial support programs available to pt.  SW encouraged pt to apply.    Brina Mukherjee [place] [address]  [id number] [place] [id number]  Ph: [phone] [email or web address]  Fax: [phone]

## 2024-06-11 NOTE — PROGRESS NOTES
Palliative Care Follow Up Note     Patient Name: Dontae Cortez Jr.   YOB: 1970   Medical Record Number: [id number]   CSN: [id number]   Date of visit: 6/11/2024     Chief Complaint/Reason for Visit:  Follow up for symptoms     History of Present Illness:         Dnotae Cortez Jr. is a 53 year old male with metastatic esophageal cancer receiving chemotherapy.  He complains of R rib and abdominal pain which varies in intensity.  This pain is neuropathic and sensitive to touch on rib area.  His abdomen is cramping, stabbing, spasm pain.  He is feeling better now that fistula has been repaired and feeding tube removed.  He is using norco 5-10mg every 4 hours including during the night with some benefit.  The pain relief doesn't last until next dose and makes pain manageable.  Topical lidocaine patches don't offer benefit.  The pain wakes him up.   He thought gabapentin had helped with neuropathy in the past but it was stopped since it made him dizzy and prone to falling.   His goal is to return back to work as a  as pain and disease improves.   He is trying to be more active but the rib pain can be intense with increased movement affecting endurance.  He denies constipation, N/V.   He has an appetite.        Problem List:  Patient Active Problem List   Diagnosis    Primary hypertension    Hyperlipidemia with target low density lipoprotein (LDL) cholesterol less than 70 mg/dL    Coronary artery disease involving coronary bypass graft of native heart with angina pectoris (HCC)    S/P CABG x 4    Nontoxic multinodular goiter    Pulmonary nodules    Thrombophlebitis leg    BRANDAN (generalized anxiety disorder)    Right shoulder Arthroscopy acromioplasty ,distal  clavicle resection, rotator cuff/labral debridment  Global 05/13/2021    Right bicipital tenosynovitis    Malignant neoplasm of esophagus (HCC)    Pleural effusion    Esophageal anastomotic leak    Esophageal obstruction    On  total parenteral nutrition (TPN)    Mechanical complication of esophagostomy (HCC)    Abdominal pain of unknown etiology    Migration of esophageal stent    Gastroparesis    Esophageal carcinoma (HCC)    Normocytic anemia    Esophageal fistula    Subacute cough    Acquired bronchoesophageal fistula (HCC)    Pneumonia of both lower lobes due to infectious organism    Malignant neoplasm of pancreas (HCC)    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 difficile colitis    Dysphagia, unspecified type    Dyspnea and respiratory abnormalities    Hypokalemia    Dysphagia    Narcotic drug use    History of esophageal cancer    Palliative care by specialist    Neoplasm related pain    Endobronchial mass    Hyponatremia    Thrombocytopenia (HCC)    Acute kidney injury (HCC)    Hyperglycemia    Aspiration pneumonitis (HCC)    C. difficile diarrhea    Aspiration pneumonia (HCC)    Malignant neoplasm metastatic to liver (HCC)    Hyperlipidemia    Nausea vomiting and diarrhea    Fistula, bronchoesophageal (HCC)    Iron deficiency anemia, unspecified iron deficiency anemia type    Azotemia    Palliative care encounter    Goals of care, counseling/discussion    Cancer related pain    Anemia    Fistula    Acute cough    Pneumonia of right lung due to infectious organism, unspecified part of lung    Bacteremia    Acute postoperative pain    Exocrine pancreatic insufficiency (HCC)    Hypertriglyceridemia      Medical History:  Past Medical History:    Back problem    Belching    Black stools    Borderline diabetes    Dx in 8/2013 - HgA1C 6.2%    C. difficile diarrhea    pt treated and without symptoms    Chest pain    Coronary artery  disease    On 8/16/13: CABG x 4 with LIMA to LAD and SVG to diagonal, OM and PDA    Decorative tattoo    Depression    Difficult intubation    h/o esophagectomy for CA; developed esophagobronchial vistula    Disorder of liver    LIVER CA    Esophageal cancer (HCC)    completed chemo    Esophageal reflux    Essential hypertension    Exposure to medical diagnostic radiation    last tx 8/18/2022    Frequent urination    Gastroparesis    Heartburn    High blood pressure    High cholesterol    Found when I had quadruple bypass    History of COVID-19    asymptomatic - pt was dx during a hospitalization for another diagnosis. No continued symptoms    History of stomach ulcers    Hyperlipidemia    Hyperlipidemia LDL goal < 70    Indigestion    Morbid obesity with BMI of 40.0-44.9, adult (HCC)    Muscle weakness    Nausea vomiting and diarrhea    Nontoxic multinodular goiter    Dx in 8/2013: pt was told that imaging showed thyroid cysts per PCP    Pancreatic cancer (HCC)    last dose 12/4/2023 is scheduled for another round 12/27/23    Peripheral vascular disease (HCC)    pt denies    Personal history of antineoplastic chemotherapy    for esophageal cancer/completed    Personal history of antineoplastic chemotherapy    pancreatic cancer    Personal history of antineoplastic chemotherapy    Last treatment 3/12/24    Problems with swallowing    Pulmonary nodules    Dx in 8/2013: CT chest showed small bilateral fissural-based lung nodules less than 1 cm    S/P CABG x 4    On 8/16/13: CABG x 4 with LIMA to LAD and SVG to diagonal, OM and PDA    Shortness of breath    when coughing; no oxygen    Vomiting     Surgical History:  Past Surgical History:   Procedure Laterality Date    Appendectomy      Appendectomy      Arthroscopy of joint unlisted      right shoulder    Cabg      On 8/16/13: CABG x 4 with LIMA to LAD and SVG to diagonal, OM and PDA    Cardiac cath lab      On 8/14/2013: cardiac cath showed 3-vessel disease    Other  surgical history      1.       Laparoscopic robotic-assisted esophagogastrectomy.    Port, indwelling, imp         Allergies:  Allergies   Allergen Reactions    Oxaliplatin HIVES     Shaking        Palliative Care Social History:    Marital Status: Alysha  Children:   Living Situation: independent with spouse      Medications:  Current Outpatient Medications   Medication Sig Dispense Refill    dicyclomine 10 MG Oral Cap Take 1 capsule (10 mg total) by mouth 4 (four) times daily before meals and nightly. 90 capsule 0    oxyCODONE 5 MG Oral Tab Take 1-2 tablets (5-10 mg total) by mouth every 4 (four) hours as needed for Pain. 90 tablet 0    gabapentin 100 MG Oral Cap Take 1 capsule (100 mg total) by mouth nightly. 30 capsule 0    OLANZAPINE 5 MG Oral Tab TAKE 1 TABLET BY MOUTH EVERY DAY AT NIGHT 90 tablet 1    pantoprazole 40 MG Oral Tab EC Take 1 tablet (40 mg total) by mouth every morning before breakfast. 30 tablet 0    prochlorperazine (COMPAZINE) 10 mg tablet Take 1 tablet (10 mg total) by mouth every 6 (six) hours as needed for Nausea. 30 tablet 3    ondansetron 4 MG Oral Tablet Dispersible Take 1 tablet (4 mg total) by mouth every 8 (eight) hours as needed for Nausea. 30 tablet 0    HYDROcodone-acetaminophen 5-325 MG Oral Tab Take 1 tablet by mouth every 4 (four) hours as needed for Pain. 40 tablet 0    SERTRALINE 100 MG Oral Tab TAKE 1.5 TABLETS (150MG TOTAL) BY MOUTH DAILY 135 tablet 1    metoprolol tartrate 25 MG Oral Tab Take 1 tablet (25 mg total) by mouth 2x Daily(Beta Blocker). 60 tablet 1    Pancrelipase, Lip-Prot-Amyl, (CREON) 78359-12983 units Oral Cap DR Particles Take 2 capsules with meals and 1 capsule with snacks 240 capsule 0    losartan 50 MG Oral Tab Take 1 tablet (50 mg total) by mouth daily. 90 tablet 2       Review of Systems:  General:  Fatigue.  Feels well.    Respiratory:  Denies SOB, denies cough  Cardiac:  Denies chest pain, heart palpitations  Abdomen:  Denies constipation, diarrhea.   Denies pain.    Psych:  No complaints.  Sleeping well    Palliative Performance Scale:   100%    Physical Examination:  General: Patient is alert and oriented, not in acute distress.  Respiratory:  Normal excursions and effort  Cardiac: Regular rate   Abdomen: Soft, non tender   Musculoskeletal: Normal gait.  Psych:  Mood/Affect appropriate    Advanced Directives Discussed and Completed:     HCPOA/Health Surrogate:    There is a completed HCPOA documentation on file in Epic.    POLST Discussed and Completed:     Palliative Care:  He isn't finding baclofen helpful for abdominal cramping so will trial dicyclomine.    Will change norco to oxycodone to trial to achieve better pain control with the goal of minimizing SE.  Gabapentin was effective in the past but he didn't tolerate the higher doses.  Since his main pain complaint is neuropathic in nature will retry gabapentin but at lower doses and titrate slowly.      Impression/Plan:   1. Neoplasm related pain  gabapentin 100 mg nightly    Oxycodone 5-10mg Q 4 prn   Dicyclomine 10mg QID prn      Planned Follow up:  2 weeks      Encounter Times  Reviewing/Obtainin minutes    Medical Exam:   minutes      Plan:   5 minutes    Notes: 5 minutes      Counseling/Education: 20 minutes    Care Coordination:  minutes      My total time spent caring for the patient on the day of the encounter: 35 minutes.        Electronically Signed by:  ARTEMIO TERRY Outpatient Palliative Nurse Practitioner

## 2024-06-11 NOTE — PROGRESS NOTES
Outpatient Oncology Care Plan  Problem list:  pain  fatigue  knowledge deficit  nausea and vomiting  peripheral neuropathy    Problems related to:    chemotherapy  side effect of treatment    Interventions:  provided general teaching    Expected outcomes:  understands plan of care    Progress towards outcome:  making progress    Education Record    Learner:  Patient  Barriers / Limitations:  None  Method:  Brief focused  Outcome:  Shows understanding  Comments: OTV C19 FOLFOX with trastuzumab and opdivo expected. Pt had J tube removed. Reports pain on right side of chest/abd from sx. States norco only last a few hours. Reports nausea and zofran is not helping, taking olanzapine at night. Numbness in fingers. Denies diarrhea or constipation. Denies any fevers. States his energy is good.

## 2024-06-11 NOTE — PROGRESS NOTES
Pt here for C19D1 Drug(s)folfox, opdivo, trazimera.  Arrives Ambulating independently, accompanied by Self     Patient was evaluated today by Treatment Nurse.    Oral medications included in this regimen:  no    Patient confirms comprehension of cancer treatment schedule:  yes    Pregnancy screening:  Not applicable    Modifications in dose or schedule:  No    Medications appearance and physical integrity checked by RN: yes.    Chemotherapy IV pump settings verified by 2 RNs:  Yes.  Frequency of blood return and site check throughout administration: Prior to administration     Infusion/treatment outcome:  patient tolerated treatment without incident    Education Record    Learner:  Patient  Barriers / Limitations:  None  Method:  Brief focused  Education / instructions given:  chemo admin  Outcome:  Shows understanding    Discharged Home, Ambulating independently, accompanied by:Self    Patient/family verbalized understanding of future appointments: by [employer] messaging

## 2024-06-14 ENCOUNTER — PATIENT MESSAGE (OUTPATIENT)
Dept: HEMATOLOGY/ONCOLOGY | Age: 54
End: 2024-06-14

## 2024-06-14 DIAGNOSIS — C15.5 MALIGNANT NEOPLASM OF LOWER THIRD OF ESOPHAGUS (HCC): Primary | ICD-10-CM

## 2024-06-14 DIAGNOSIS — G89.3 NEOPLASM RELATED PAIN: Primary | ICD-10-CM

## 2024-06-14 RX ORDER — MORPHINE SULFATE 15 MG/1
15 TABLET, FILM COATED, EXTENDED RELEASE ORAL EVERY 12 HOURS SCHEDULED
Qty: 30 TABLET | Refills: 0 | Status: SHIPPED | OUTPATIENT
Start: 2024-06-14 | End: 2024-06-29

## 2024-06-18 ENCOUNTER — APPOINTMENT (OUTPATIENT)
Dept: CT IMAGING | Facility: HOSPITAL | Age: 54
DRG: 299 | End: 2024-06-18
Attending: EMERGENCY MEDICINE

## 2024-06-18 ENCOUNTER — APPOINTMENT (OUTPATIENT)
Dept: ULTRASOUND IMAGING | Facility: HOSPITAL | Age: 54
DRG: 299 | End: 2024-06-18
Attending: EMERGENCY MEDICINE

## 2024-06-18 ENCOUNTER — APPOINTMENT (OUTPATIENT)
Dept: GENERAL RADIOLOGY | Facility: HOSPITAL | Age: 54
DRG: 299 | End: 2024-06-18

## 2024-06-18 ENCOUNTER — HOSPITAL ENCOUNTER (INPATIENT)
Facility: HOSPITAL | Age: 54
LOS: 1 days | Discharge: HOME | DRG: 299 | End: 2024-06-19
Attending: EMERGENCY MEDICINE | Admitting: INTERNAL MEDICINE

## 2024-06-18 DIAGNOSIS — R41.82 ALTERED MENTAL STATUS, UNSPECIFIED ALTERED MENTAL STATUS TYPE: ICD-10-CM

## 2024-06-18 DIAGNOSIS — I26.99 ACUTE PULMONARY EMBOLISM WITHOUT ACUTE COR PULMONALE, UNSPECIFIED PULMONARY EMBOLISM TYPE (HCC): Primary | ICD-10-CM

## 2024-06-18 DIAGNOSIS — I26.99 OTHER ACUTE PULMONARY EMBOLISM WITHOUT ACUTE COR PULMONALE (HCC): ICD-10-CM

## 2024-06-18 DIAGNOSIS — I82.431 ACUTE DEEP VEIN THROMBOSIS (DVT) OF POPLITEAL VEIN OF RIGHT LOWER EXTREMITY (HCC): ICD-10-CM

## 2024-06-18 LAB
ALBUMIN SERPL-MCNC: 3.1 G/DL (ref 3.4–5)
ALBUMIN/GLOB SERPL: 0.7 {RATIO} (ref 1–2)
ALP LIVER SERPL-CCNC: 122 U/L
ALT SERPL-CCNC: 26 U/L
ANION GAP SERPL CALC-SCNC: 6 MMOL/L (ref 0–18)
APTT PPP: 30.5 SECONDS (ref 23–36)
AST SERPL-CCNC: 18 U/L (ref 15–37)
BASOPHILS # BLD AUTO: 0.04 X10(3) UL (ref 0–0.2)
BASOPHILS NFR BLD AUTO: 0.6 %
BILIRUB SERPL-MCNC: 0.3 MG/DL (ref 0.1–2)
BUN BLD-MCNC: 9 MG/DL (ref 9–23)
CALCIUM BLD-MCNC: 8.8 MG/DL (ref 8.5–10.1)
CHLORIDE SERPL-SCNC: 103 MMOL/L (ref 98–112)
CO2 SERPL-SCNC: 29 MMOL/L (ref 21–32)
CREAT BLD-MCNC: 0.73 MG/DL
EGFRCR SERPLBLD CKD-EPI 2021: 108 ML/MIN/1.73M2 (ref 60–?)
EOSINOPHIL # BLD AUTO: 0.25 X10(3) UL (ref 0–0.7)
EOSINOPHIL NFR BLD AUTO: 3.7 %
ERYTHROCYTE [DISTWIDTH] IN BLOOD BY AUTOMATED COUNT: 13.9 %
GLOBULIN PLAS-MCNC: 4.3 G/DL (ref 2.8–4.4)
GLUCOSE BLD-MCNC: 105 MG/DL (ref 70–99)
HCT VFR BLD AUTO: 36.4 %
HGB BLD-MCNC: 11.3 G/DL
IMM GRANULOCYTES # BLD AUTO: 0.14 X10(3) UL (ref 0–1)
IMM GRANULOCYTES NFR BLD: 2.1 %
LYMPHOCYTES # BLD AUTO: 0.78 X10(3) UL (ref 1–4)
LYMPHOCYTES NFR BLD AUTO: 11.6 %
MCH RBC QN AUTO: 29.4 PG (ref 26–34)
MCHC RBC AUTO-ENTMCNC: 31 G/DL (ref 31–37)
MCV RBC AUTO: 94.8 FL
MONOCYTES # BLD AUTO: 0.39 X10(3) UL (ref 0.1–1)
MONOCYTES NFR BLD AUTO: 5.8 %
NEUTROPHILS # BLD AUTO: 5.15 X10 (3) UL (ref 1.5–7.7)
NEUTROPHILS # BLD AUTO: 5.15 X10(3) UL (ref 1.5–7.7)
NEUTROPHILS NFR BLD AUTO: 76.2 %
OSMOLALITY SERPL CALC.SUM OF ELEC: 285 MOSM/KG (ref 275–295)
PLATELET # BLD AUTO: 162 10(3)UL (ref 150–450)
POTASSIUM SERPL-SCNC: 3.8 MMOL/L (ref 3.5–5.1)
PROT SERPL-MCNC: 7.4 G/DL (ref 6.4–8.2)
RBC # BLD AUTO: 3.84 X10(6)UL
SODIUM SERPL-SCNC: 138 MMOL/L (ref 136–145)
TROPONIN I SERPL HS-MCNC: 5 NG/L
WBC # BLD AUTO: 6.8 X10(3) UL (ref 4–11)

## 2024-06-18 PROCEDURE — 70450 CT HEAD/BRAIN W/O DYE: CPT | Performed by: EMERGENCY MEDICINE

## 2024-06-18 PROCEDURE — 71045 X-RAY EXAM CHEST 1 VIEW: CPT | Performed by: EMERGENCY MEDICINE

## 2024-06-18 PROCEDURE — 99223 1ST HOSP IP/OBS HIGH 75: CPT | Performed by: HOSPITALIST

## 2024-06-18 PROCEDURE — 93971 EXTREMITY STUDY: CPT | Performed by: EMERGENCY MEDICINE

## 2024-06-18 PROCEDURE — 71260 CT THORAX DX C+: CPT | Performed by: EMERGENCY MEDICINE

## 2024-06-18 RX ORDER — PANTOPRAZOLE SODIUM 40 MG/1
40 TABLET, DELAYED RELEASE ORAL
Status: DISCONTINUED | OUTPATIENT
Start: 2024-06-19 | End: 2024-06-19

## 2024-06-18 RX ORDER — OLANZAPINE 5 MG/1
5 TABLET ORAL NIGHTLY
Status: DISCONTINUED | OUTPATIENT
Start: 2024-06-19 | End: 2024-06-19

## 2024-06-18 RX ORDER — MORPHINE SULFATE 4 MG/ML
4 INJECTION, SOLUTION INTRAMUSCULAR; INTRAVENOUS EVERY 30 MIN PRN
Status: COMPLETED | OUTPATIENT
Start: 2024-06-18 | End: 2024-06-18

## 2024-06-18 RX ORDER — LOSARTAN POTASSIUM 50 MG/1
50 TABLET ORAL DAILY
Status: DISCONTINUED | OUTPATIENT
Start: 2024-06-19 | End: 2024-06-19

## 2024-06-18 RX ORDER — HEPARIN SODIUM 1000 [USP'U]/ML
80 INJECTION, SOLUTION INTRAVENOUS; SUBCUTANEOUS ONCE
Status: COMPLETED | OUTPATIENT
Start: 2024-06-18 | End: 2024-06-18

## 2024-06-18 RX ORDER — HEPARIN SODIUM AND DEXTROSE 10000; 5 [USP'U]/100ML; G/100ML
18 INJECTION INTRAVENOUS ONCE
Status: COMPLETED | OUTPATIENT
Start: 2024-06-18 | End: 2024-06-19

## 2024-06-18 RX ORDER — DICYCLOMINE HYDROCHLORIDE 10 MG/1
10 CAPSULE ORAL
Status: DISCONTINUED | OUTPATIENT
Start: 2024-06-19 | End: 2024-06-19

## 2024-06-18 RX ORDER — HEPARIN SODIUM AND DEXTROSE 10000; 5 [USP'U]/100ML; G/100ML
INJECTION INTRAVENOUS CONTINUOUS
Status: DISCONTINUED | OUTPATIENT
Start: 2024-06-19 | End: 2024-06-19

## 2024-06-18 RX ORDER — OXYCODONE HYDROCHLORIDE 5 MG/1
TABLET ORAL EVERY 4 HOURS PRN
Status: DISCONTINUED | OUTPATIENT
Start: 2024-06-18 | End: 2024-06-19

## 2024-06-18 RX ORDER — PROCHLORPERAZINE MALEATE 10 MG
10 TABLET ORAL EVERY 6 HOURS PRN
Status: DISCONTINUED | OUTPATIENT
Start: 2024-06-18 | End: 2024-06-19

## 2024-06-18 RX ORDER — GABAPENTIN 100 MG/1
100 CAPSULE ORAL NIGHTLY
Status: DISCONTINUED | OUTPATIENT
Start: 2024-06-19 | End: 2024-06-19

## 2024-06-18 NOTE — ED INITIAL ASSESSMENT (HPI)
Pt reports chest pain that started on Sunday after noon - right sided, intermittent. Pt also reports right leg pain- by heel and calf - hx of having surgery in April. No thinners

## 2024-06-19 VITALS
DIASTOLIC BLOOD PRESSURE: 76 MMHG | WEIGHT: 166.88 LBS | TEMPERATURE: 98 F | BODY MASS INDEX: 21.42 KG/M2 | OXYGEN SATURATION: 100 % | SYSTOLIC BLOOD PRESSURE: 159 MMHG | RESPIRATION RATE: 20 BRPM | HEART RATE: 51 BPM | HEIGHT: 74 IN

## 2024-06-19 LAB
APTT PPP: 80.5 SECONDS (ref 23–36)
ATRIAL RATE: 50 BPM
P AXIS: 26 DEGREES
P-R INTERVAL: 134 MS
Q-T INTERVAL: 462 MS
QRS DURATION: 86 MS
QTC CALCULATION (BEZET): 421 MS
R AXIS: 17 DEGREES
T AXIS: 93 DEGREES
VENTRICULAR RATE: 50 BPM

## 2024-06-19 PROCEDURE — 99255 IP/OBS CONSLTJ NEW/EST HI 80: CPT | Performed by: INTERNAL MEDICINE

## 2024-06-19 PROCEDURE — 99239 HOSP IP/OBS DSCHRG MGMT >30: CPT | Performed by: HOSPITALIST

## 2024-06-19 RX ORDER — DIPHENHYDRAMINE HCL 25 MG
25 CAPSULE ORAL NIGHTLY PRN
Status: DISCONTINUED | OUTPATIENT
Start: 2024-06-19 | End: 2024-06-19

## 2024-06-19 RX ORDER — ECHINACEA PURPUREA EXTRACT 125 MG
1 TABLET ORAL
Status: DISCONTINUED | OUTPATIENT
Start: 2024-06-19 | End: 2024-06-19

## 2024-06-19 RX ORDER — PROCHLORPERAZINE EDISYLATE 5 MG/ML
5 INJECTION INTRAMUSCULAR; INTRAVENOUS EVERY 8 HOURS PRN
Status: DISCONTINUED | OUTPATIENT
Start: 2024-06-19 | End: 2024-06-19

## 2024-06-19 RX ORDER — MELATONIN
3 NIGHTLY PRN
Status: DISCONTINUED | OUTPATIENT
Start: 2024-06-19 | End: 2024-06-19

## 2024-06-19 RX ORDER — ACETAMINOPHEN 500 MG
500 TABLET ORAL EVERY 4 HOURS PRN
Status: DISCONTINUED | OUTPATIENT
Start: 2024-06-19 | End: 2024-06-19

## 2024-06-19 RX ORDER — ONDANSETRON 2 MG/ML
4 INJECTION INTRAMUSCULAR; INTRAVENOUS EVERY 6 HOURS PRN
Status: DISCONTINUED | OUTPATIENT
Start: 2024-06-19 | End: 2024-06-19

## 2024-06-19 RX ORDER — POTASSIUM CHLORIDE 20 MEQ/1
40 TABLET, EXTENDED RELEASE ORAL ONCE
Status: COMPLETED | OUTPATIENT
Start: 2024-06-19 | End: 2024-06-19

## 2024-06-19 NOTE — CM/SW NOTE
Received call from [facility] stating that they are current w/ pt. TYRESE and updates sent in [facility].     [facility]  P: [phone]  F: [phone]

## 2024-06-19 NOTE — PLAN OF CARE
Assumed pt care at approximately 0730. A&Ox4. Room air, pt reports pain on R side of abdomen (PRN pain medication given as ordered; see MAR). No shortness of breath, vital signs stable, unable to assess heart rhythm, patient declining telemetry at this time. Voids. Up independently, patient declining bed alarm.    Plan of care: heparin gtt, pain management     Plan of care updated with patient. Questions answered, pt verbalized understanding. Call light is within reach, all needs met at this time.    Problem: PAIN - ADULT  Goal: Verbalizes/displays adequate comfort level or patient's stated pain goal  Description: INTERVENTIONS:  - Encourage pt to monitor pain and request assistance  - Assess pain using appropriate pain scale  - Administer analgesics based on type and severity of pain and evaluate response  - Implement non-pharmacological measures as appropriate and evaluate response  - Consider cultural and social influences on pain and pain management  - Manage/alleviate anxiety  - Utilize distraction and/or relaxation techniques  - Monitor for opioid side effects  - Notify MD/LIP if interventions unsuccessful or patient reports new pain  - Anticipate increased pain with activity and pre-medicate as appropriate  Outcome: Progressing     Problem: METABOLIC/FLUID AND ELECTROLYTES - ADULT  Goal: Electrolytes maintained within normal limits  Description: INTERVENTIONS:  - Monitor labs and rhythm and assess patient for signs and symptoms of electrolyte imbalances  - Administer electrolyte replacement as ordered  - Monitor response to electrolyte replacements, including rhythm and repeat lab results as appropriate  - Fluid restriction as ordered  - Instruct patient on fluid and nutrition restrictions as appropriate  Outcome: Progressing     Problem: Patient/Family Goals  Goal: Patient/Family Long Term Goal  Description: Patient's Long Term Goal: Stay out of hospital    Interventions:  - Follow up with PCP after  discharge  - Take medication as prescribed  - See additional Care Plan goals for specific interventions  Outcome: Progressing  Goal: Patient/Family Short Term Goal  Description: Patient's Short Term Goal: No more pain and go home    Interventions:   - Test ordered  - Monitor on tele  - Monitor labs  - See additional Care Plan goals for specific interventions  Outcome: Progressing

## 2024-06-19 NOTE — PROGRESS NOTES
NURSING ADMISSION NOTE    Patient admitted via cart  Oriented to room  Safety precautions initiated  Bed in low position  Call light within reach    Patient received alert and oriented 3-4. Up  SBA. On RA. SB on tele. Pt refused bed alarm. Continent of bowel and bladder. Reports of right sided chest pain, prn pain medication administered. Heparin gtt infusing per PE/DVT protocol. POC : Heparin gtt, Consults to see. Fall precautions in place. Call light within reach.    0400: Pt is having visual hallucinations, seeing person in the room. Pt checking cabinets and toilet thinking that someone is hiding. Pt was reoriented and will continue to monitor. MD on call was notified.

## 2024-06-19 NOTE — H&P
[facility]     Dontae Cortez Jr. Patient Status:  Inpatient    10/15/1970 MRN [id number]   Location [facility] 8NE-A Attending Georgette Mahoney MD   Hosp Day # 0 PCP Adrian Horowitz MD     Chief Complaint: chest pain    Subjective:    History of Present Illness:     Dontae Cortez Jr. is a 53 year old male with past medical history significant for metastatic esophageal cancer, CAD with prev CABG, HTN, DL, GERD/PUD, depression/anxiety presents to the ER with c/o chest pain and right lower extremity pain and swelling.  In the ER, on imaging he was found to have acute PE and right lower extremity DVT.  He denies shortness of breath, lightheadedness or dizziness.  He also c/o back pain.    History/Other:    Past Medical History:  Past Medical History:    Back problem    Belching    Black stools    Borderline diabetes    Dx in 2013 - HgA1C 6.2%    C. difficile diarrhea    pt treated and without symptoms    Chest pain    Coronary artery disease    On 13: CABG x 4 with LIMA to LAD and SVG to diagonal, OM and PDA    Decorative tattoo    Depression    Difficult intubation    h/o esophagectomy for CA; developed esophagobronchial vistula    Disorder of liver    LIVER CA    Esophageal cancer (HCC)    completed chemo    Esophageal reflux    Essential hypertension    Exposure to medical diagnostic radiation    last tx 2022    Frequent urination    Gastroparesis    Heartburn    High blood pressure    High cholesterol    Found when I had quadruple bypass    History of COVID-19    asymptomatic - pt was dx during a hospitalization for another diagnosis. No continued symptoms    History of stomach ulcers    Hyperlipidemia    Hyperlipidemia LDL goal < 70    Indigestion    Morbid obesity with BMI of 40.0-44.9, adult (HCC)    Muscle weakness    Nausea vomiting and diarrhea    Nontoxic multinodular goiter    Dx in 2013: pt was told that imaging showed thyroid cysts per PCP     Pancreatic cancer (HCC)    last dose 12/4/2023 is scheduled for another round 12/27/23    Peripheral vascular disease (HCC)    pt denies    Personal history of antineoplastic chemotherapy    for esophageal cancer/completed    Personal history of antineoplastic chemotherapy    pancreatic cancer    Personal history of antineoplastic chemotherapy    Last treatment 3/12/24    Problems with swallowing    Pulmonary nodules    Dx in 8/2013: CT chest showed small bilateral fissural-based lung nodules less than 1 cm    S/P CABG x 4    On 8/16/13: CABG x 4 with LIMA to LAD and SVG to diagonal, OM and PDA    Shortness of breath    when coughing; no oxygen    Vomiting     Past Surgical History:   Past Surgical History:   Procedure Laterality Date    Appendectomy      Appendectomy      Arthroscopy of joint unlisted      right shoulder    Cabg      On 8/16/13: CABG x 4 with LIMA to LAD and SVG to diagonal, OM and PDA    Cardiac cath lab      On 8/14/2013: cardiac cath showed 3-vessel disease    Other surgical history      1.       Laparoscopic robotic-assisted esophagogastrectomy.    Port, indwelling, imp        Family History:   Family History   Problem Relation Age of Onset    Cancer Mother         breast and colon     Diabetes Neg      Social History:    reports that he quit smoking about 10 years ago. His smoking use included cigarettes. He started smoking about 37 years ago. He has a 27 pack-year smoking history. He has never used smokeless tobacco. He reports that he does not drink alcohol and does not use drugs.     Allergies:   Allergies   Allergen Reactions    Oxaliplatin HIVES     Shaking        Medications:    Current Facility-Administered Medications on File Prior to Encounter   Medication Dose Route Frequency Provider Last Rate Last Admin    [COMPLETED] nivolumab (Opdivo) 240 mg in sodium chloride 0.9% 124 mL IVPB  240 mg Intravenous Once Senia Foster MD   Stopped at 06/11/24 0930    [COMPLETED] trastuzumab-qyyp  (Trazimera) 336 mg in sodium chloride 0.9% 266 mL infusion  4 mg/kg (Treatment Plan Recorded) Intravenous Once Senia Foster MD   Stopped at 24 1007    [COMPLETED] methylPREDNISolone sodium succinate (Solu-MEDROL) injection 125 mg  125 mg Intravenous Once Cristina Brown APRN   125 mg at 24 1021    [COMPLETED] diphenhydrAMINE (Benadryl) 50 mg/mL  injection 25 mg  25 mg Intravenous Once Cristina Brown APRN   25 mg at 24 1025    [COMPLETED] ondansetron (Zofran) 16 mg, dexAMETHasone (Decadron) 20 mg in sodium chloride 0.9% 110 mL IVPB   Intravenous Once Senia Foster MD   Stopped at 24 1044    [COMPLETED] oxaliplatin (Eloxatin) 155 mg in dextrose 5% 281 mL infusion  75 mg/m2 (Treatment Plan Recorded) Intravenous Once Senia Foster MD   Stopped at 24 1312    [COMPLETED] leucovorin 850 mg in dextrose 5% 250 mL infusion  400 mg/m2 (Treatment Plan Recorded) Intravenous Once Senia Fotser MD   Stopped at 24 1312    [COMPLETED] morphINE PF 4 MG/ML injection 4 mg  4 mg Intravenous Once Aman Torrez MD   4 mg at 24 1224    [COMPLETED] morphINE PF 4 MG/ML injection 4 mg  4 mg Intravenous Once Aman Torrez MD   4 mg at 24 1326    [COMPLETED] heparin (Porcine) 100 Units/mL lock flush 500 Units  5 mL Intracatheter Once Katie Conner APRN   500 Units at 05/15/24 1330    [COMPLETED] HYDROmorphone (Dilaudid) 1 MG/ML injection 1 mg  1 mg Intravenous Once Yessenia Evangelista MD   1 mg at 24 0827    [COMPLETED] HYDROmorphone (Dilaudid) 1 MG/ML injection 1 mg  1 mg Intravenous Once Yessenia Evangelista MD   1 mg at 24 0918    [COMPLETED] heparin (Porcine) 100 Units/mL lock flush 500 Units  5 mL Intravenous Once Yessenia Evangelista MD   500 Units at 24 0956    [] acetylcysteine (Mucomyst) 20 % nebulizer solution 3 mL  3 mL Nebulization Q6H Dusty Aguirre MD   3 mL at 24    [COMPLETED] HYDROmorphone (Dilaudid) 1 MG/ML injection 1 mg  1 mg Intravenous  Q30 Min PRN Martin Doan MD   1 mg at 24 0646    [COMPLETED] sodium chloride 0.9 % IV bolus 1,000 mL  1,000 mL Intravenous Once Martin Doan MD   Stopped at 24 0755    [COMPLETED] iopamidol 76% (ISOVUE-370) injection for power injector  80 mL Intravenous ONCE PRN Martin Doan MD   80 mL at 24 0646    [COMPLETED] azithromycin (Zithromax) 500 mg in sodium chloride 0.9% 250mL IVPB premix  500 mg Intravenous Once Martin Doan  mL/hr at 24 0829 500 mg at 24 0829    [COMPLETED] piperacillin-tazobactam (Zosyn) 4.5 g in dextrose 5% 100 mL IVPB-ADDV  4.5 g Intravenous Once Martin Doan MD   Stopped at 24 0831    [COMPLETED] sodium chloride 0.9% infusion 1,000 mL  1,000 mL Intravenous Once Martin Doan  mL/hr at 24 0755 1,000 mL at 24 0755    [COMPLETED] HYDROmorphone (Dilaudid) 1 MG/ML injection 1 mg  1 mg Intravenous Q30 Min PRN Martin Doan MD   1 mg at 24 1036    [COMPLETED] vancomycin (Vancocin) 1.75 g in sodium chloride 0.9% 500mL IVPB premix  25 mg/kg Intravenous Once Martin Doan  mL/hr at 24 1049 1,750 mg at 24 1049    [] sodium chloride 0.9% infusion   Intravenous Continuous Eitan Landeros MD   Stopped at 24 0902    [COMPLETED] azithromycin (Zithromax) 500 mg in sodium chloride 0.9% 250mL IVPB premix  500 mg Intravenous Q24H Eitan Landeros  mL/hr at 24 0817 500 mg at 24 0817    [COMPLETED] morphINE PF 2 MG/ML injection 2 mg  2 mg Intravenous Once Roxanne Fitch,    2 mg at 24 1316    [COMPLETED] ceFAZolin (Ancef) 2 g in 20mL IV syringe premix  2 g Intravenous On Call to OR Eugenia Cooper APRN   2 g at 24 1319    [COMPLETED] hydrALAzine (Apresoline) 20 mg/mL injection 10 mg  10 mg Intravenous Once El Oquendo MD   10 mg at 24 1511    [COMPLETED] sodium chloride 0.9 % IV bolus 2,040 mL  30 mL/kg Intravenous Once Hudson Gan  mL/hr at 24 1130 2,040  mL at 24 1130    [COMPLETED] ondansetron (Zofran) 4 MG/2ML injection 4 mg  4 mg Intravenous Once Hudson Gan MD   4 mg at 24 1155    [] sodium chloride 0.9% infusion   Intravenous Continuous Hudson Gan MD   Stopped at 24 1400    [] ondansetron (Zofran) 4 MG/2ML injection 4 mg  4 mg Intravenous Q4H PRN Hudson Gan MD        [COMPLETED] ondansetron (Zofran) 16 mg, dexAMETHasone (Decadron) 20 mg in sodium chloride 0.9% 110 mL IVPB   Intravenous Once Katie Conner APRN   Stopped at 24 1205    [COMPLETED] nivolumab (Opdivo) 240 mg in sodium chloride 0.9% 124 mL IVPB  240 mg Intravenous Once Katie Conner APRN   Stopped at 24 1111    [COMPLETED] trastuzumab-qyyp (Trazimera) 336 mg in sodium chloride 0.9% 266 mL infusion  4 mg/kg (Treatment Plan Recorded) Intravenous Once Katie Conner APRN   Stopped at 24 1147    [COMPLETED] oxaliplatin (Eloxatin) 155 mg in dextrose 5% 281 mL infusion  75 mg/m2 (Treatment Plan Recorded) Intravenous Once Katie Conner APRN   Stopped at 24 1326    [COMPLETED] leucovorin 850 mg in dextrose 5% 250 mL infusion  400 mg/m2 (Treatment Plan Recorded) Intravenous Once Katie Conner APRN   Stopped at 24 1326    [COMPLETED] methylPREDNISolone sodium succinate (Solu-MEDROL) injection 125 mg  125 mg Intravenous Once Grecia Angelo APRN   125 mg at 24 1328    [COMPLETED] famotidine (Pepcid) 20 mg/2mL injection 20 mg  20 mg Intravenous Once Grecia Angelo APRN   20 mg at 24 1330    [COMPLETED] loperamide (Imodium) cap 4 mg  4 mg Oral Once Grecia Angelo APRN   4 mg at 24 1335     Current Outpatient Medications on File Prior to Encounter   Medication Sig Dispense Refill    morphINE ER 15 MG Oral Tab CR Take 1 tablet (15 mg total) by mouth every 12 (twelve) hours for 15 days. (Patient not taking: Reported on 2024) 30 tablet 0    OLANZAPINE 5 MG Oral Tab TAKE 1 TABLET BY MOUTH EVERY  DAY AT NIGHT 90 tablet 1    pantoprazole 40 MG Oral Tab EC Take 1 tablet (40 mg total) by mouth every morning before breakfast. 30 tablet 0    prochlorperazine (COMPAZINE) 10 mg tablet Take 1 tablet (10 mg total) by mouth every 6 (six) hours as needed for Nausea. 30 tablet 3    dicyclomine 10 MG Oral Cap Take 1 capsule (10 mg total) by mouth 4 (four) times daily before meals and nightly. 90 capsule 0    oxyCODONE 5 MG Oral Tab Take 1-2 tablets (5-10 mg total) by mouth every 4 (four) hours as needed for Pain. 90 tablet 0    gabapentin 100 MG Oral Cap Take 1 capsule (100 mg total) by mouth nightly. 30 capsule 0    ondansetron 4 MG Oral Tablet Dispersible Take 1 tablet (4 mg total) by mouth every 8 (eight) hours as needed for Nausea. 30 tablet 0    SERTRALINE 100 MG Oral Tab TAKE 1.5 TABLETS (150MG TOTAL) BY MOUTH DAILY 135 tablet 1    metoprolol tartrate 25 MG Oral Tab Take 1 tablet (25 mg total) by mouth 2x Daily(Beta Blocker). 60 tablet 1    losartan 50 MG Oral Tab Take 1 tablet (50 mg total) by mouth daily. 90 tablet 2    [] amoxicillin-pot clavulanate 400-57 mg/5mL Oral Recon Susp 11 mL (880 mg total) by Per J Tube route 2 (two) times daily for 8 days. 176 mL 0    [] vancomycin 50 mg/mL Oral Recon Soln 2.5 mL (125 mg total) by Per J Tube route daily for 8 days. 20 mL 0    [] metoclopramide (REGLAN) 10 MG Oral Tab Take 1 tablet (10 mg total) by mouth 4 (four) times daily before meals and nightly. 120 tablet 2    Pancrelipase, Lip-Prot-Amyl, (CREON) 54736-66495 units Oral Cap DR Particles Take 2 capsules with meals and 1 capsule with snacks 240 capsule 0       Review of Systems:   A comprehensive review of systems was completed.    Pertinent positives and negatives noted in the HPI.    Objective:   Physical Exam:    /73 (BP Location: Left arm)   Pulse 56   Temp 97.8 °F (36.6 °C) (Oral)   Resp 16   Ht 6' 2\" (1.88 m)   Wt 166 lb 14.2 oz (75.7 kg)   SpO2 100%   BMI 21.43 kg/m²    General: No acute distress, Alert  Respiratory: No rhonchi, no wheezes  Cardiovascular: S1, S2. Regular rate and rhythm  Abdomen: Soft, Non-tender, non-distended, positive bowel sounds  Neuro: No new focal deficits  Extremities: No edema      Results:    Labs:      Labs Last 24 Hours:    Recent Labs   Lab 06/18/24  1617   RBC 3.84*   HGB 11.3*   HCT 36.4*   MCV 94.8   MCH 29.4   MCHC 31.0   RDW 13.9   NEPRELIM 5.15   WBC 6.8   .0       Recent Labs   Lab 06/18/24  1617   *   BUN 9   CREATSERUM 0.73   EGFRCR 108   CA 8.8   ALB 3.1*      K 3.8      CO2 29.0   ALKPHO 122*   AST 18   ALT 26   BILT 0.3   TP 7.4       Lab Results   Component Value Date    PT 20.4 (H) 01/30/2014    PT 22.9 (H) 01/13/2014    PT 25.7 (H) 01/08/2014    INR 1.13 04/16/2024    INR 1.31 (H) 04/10/2024    INR 1.21 (H) 03/15/2024       Recent Labs   Lab 06/18/24  1617   TROPHS 5       No results for input(s): \"TROP\", \"PBNP\" in the last 168 hours.    No results for input(s): \"PCT\" in the last 168 hours.    Imaging: Imaging data reviewed in Epic.    Assessment & Plan:      #Acute PE/DVT in setting of metastatic esophageal cancer  Started on heparin gtt  Likely transition to DOAC on discharge  Will likely need life long anticoagulation given malignancy  Defer further imaging relating to underlying malignancy to oncology    #HTN, controlled    #DL, statin    #GERD/PUD, PPi    #CAD with prev CABG    #Depression, anxiety, resume home meds        Plan of care discussed with pt and RN.    Megan Arshad MD    Supplementary Documentation:     The 21st Century Cures Act makes medical notes like these available to patients in the interest of transparency. Please be advised this is a medical document. Medical documents are intended to carry relevant information, facts as evident, and the clinical opinion of the practitioner. The medical note is intended as peer to peer communication and may appear blunt or direct. It is written in  medical language and may contain abbreviations or verbiage that are unfamiliar.

## 2024-06-19 NOTE — DISCHARGE SUMMARY
[facility]  DISCHARGE SUMMARY     Dontae Cortez Jr. Patient Status:  Inpatient    10/15/1970 MRN [id number]   Location [facility] 8NE-A Attending Young Ham, DO   Hosp Day # 1 PCP Adrian Horowitz MD     Date of Admission: 2024  Date of Discharge:   2024    Discharge Disposition: Home or Self Care    Discharge Diagnosis:  #Acute PE/DVT in setting of metastatic esophageal cancer  cont on heparin gtt  Heme to see  Likely transition to DOAC on discharge  Will likely need life long anticoagulation given malignancy  Defer further imaging relating to underlying malignancy to oncology     #HTN, controlled     #DL, statin     #GERD/PUD, PPi     #CAD with prev CABG     #Depression, anxiety, resume home meds     History of Present Illness: Dontae Cortez Jr. is a 53 year old male with past medical history significant for metastatic esophageal cancer, CAD with prev CABG, HTN, DL, GERD/PUD, depression/anxiety presents to the ER with c/o chest pain and right lower extremity pain and swelling.  In the ER, on imaging he was found to have acute PE and right lower extremity DVT.  He denies shortness of breath, lightheadedness or dizziness.  He also c/o back pain     Brief Synopsis: pt admitted for sob/cp, found to have Acute PE & Acute RLE DVT. Pt initially on hpearin gtt. On RA. VS stable. Pt transitioned to eliquis on dc.     Lace+ Score: 79  59-90 High Risk  29-58 Medium Risk  0-28   Low Risk       TCM Follow-Up Recommendation:  LACE > 58: High Risk of readmission after discharge from the hospital.      Procedures during hospitalization:       Incidental or significant findings and recommendations (brief descriptions):      Lab/Test results pending at Discharge:       Consultants:  Heme/onc    Discharge Medication List:     Discharge Medications        START taking these medications        Instructions Prescription details   apixaban 5 MG Tabs  Commonly known as: Eliquis      Take 2 tabs (10mg)  by mouth twice daily for 7 days, then take 1 tab (5mg) by mouth twice daily thereafter.   Quantity: 74 tablet  Refills: 0            ASK your doctor about these medications        Instructions Prescription details   amoxicillin-pot clavulanate 400-57 mg/5mL Susr  Commonly known as: Augmentin  Ask about: Should I take this medication?      11 mL (880 mg total) by Per J Tube route 2 (two) times daily for 8 days.   Stop taking on: April 28, 2024  Quantity: 176 mL  Refills: 0     Creon 32522-72732 units Cpep  Generic drug: Pancrelipase (Lip-Prot-Amyl)      Take 2 capsules with meals and 1 capsule with snacks   Quantity: 240 capsule  Refills: 0     dicyclomine 10 MG Caps  Commonly known as: Bentyl      Take 1 capsule (10 mg total) by mouth 4 (four) times daily before meals and nightly.   Quantity: 90 capsule  Refills: 0     gabapentin 100 MG Caps  Commonly known as: Neurontin      Take 1 capsule (100 mg total) by mouth nightly.   Quantity: 30 capsule  Refills: 0     losartan 50 MG Tabs  Commonly known as: Cozaar      Take 1 tablet (50 mg total) by mouth daily.   Quantity: 90 tablet  Refills: 2     metoclopramide 10 MG Tabs  Commonly known as: Reglan  Ask about: Should I take this medication?      Take 1 tablet (10 mg total) by mouth 4 (four) times daily before meals and nightly.   Stop taking on: Alanna 3, 2024  Quantity: 120 tablet  Refills: 2     metoprolol tartrate 25 MG Tabs  Commonly known as: Lopressor      Take 1 tablet (25 mg total) by mouth 2x Daily(Beta Blocker).   Quantity: 60 tablet  Refills: 1     morphINE ER 15 MG Tbcr  Commonly known as: MS Contin      Take 1 tablet (15 mg total) by mouth every 12 (twelve) hours for 15 days.   Stop taking on: June 29, 2024  Quantity: 30 tablet  Refills: 0     OLANZapine 5 MG Tabs  Commonly known as: ZyPREXA      TAKE 1 TABLET BY MOUTH EVERY DAY AT NIGHT   Quantity: 90 tablet  Refills: 1     ondansetron 4 MG Tbdp  Commonly known as: Zofran-ODT      Take 1 tablet (4 mg total)  by mouth every 8 (eight) hours as needed for Nausea.   Quantity: 30 tablet  Refills: 0     oxyCODONE 5 MG Tabs      Take 1-2 tablets (5-10 mg total) by mouth every 4 (four) hours as needed for Pain.   Quantity: 90 tablet  Refills: 0     pantoprazole 40 MG Tbec  Commonly known as: Protonix      Take 1 tablet (40 mg total) by mouth every morning before breakfast.   Quantity: 30 tablet  Refills: 0     prochlorperazine 10 mg tablet  Commonly known as: Compazine      Take 1 tablet (10 mg total) by mouth every 6 (six) hours as needed for Nausea.   Quantity: 30 tablet  Refills: 3     sertraline 100 MG Tabs  Commonly known as: Zoloft      TAKE 1.5 TABLETS (150MG TOTAL) BY MOUTH DAILY   Quantity: 135 tablet  Refills: 1     vancomycin 50 mg/mL Solr  Commonly known as: Firvanq  Ask about: Should I take this medication?      2.5 mL (125 mg total) by Per J Tube route daily for 8 days.   Stop taking on: April 29, 2024  Quantity: 20 mL  Refills: 0               Where to Get Your Medications        These medications were sent to [facility] [address], [facility] [phone], [phone]  [address] [place] [id number]      Phone: [phone]   apixaban 5 MG Tabs         [place] reviewed:     Follow-up appointment:   No follow-up provider specified.  Appointments for Next 30 Days 6/19/2024 - 7/19/2024        Date Arrival Time Visit Type Length Department Provider     [facility]2024  9:00 [facility] 40 min [facility], [facility] Adrian Horowitz MD    Patient Instructions:         Location Instructions:     Masks are optional for all patients and visitors, unless otherwise indicated.               6/24/2024  8:00 AM  EH HEM/ONC-DIETARY F/U [[id number]] 30 min [facility] in [place] Kayla Hagen RD    Patient Instructions:     Your appointment is on the [facility] campus in the Cancer Center. The address is [facility]  [facility], [facility], [address]. Please register at the Cancer Center  on the second floor.          Location Instructions:     **IF YOU NEED LABWORK OR AN INFUSION ALONG WITH YOUR APPOINTMENT, YOU MUST CALL TO SCHEDULE.**  Your appointment is scheduled at the [facility] in [place]. The address is [address] [place] [address]. Please park in the GREEN LOT and enter through the CANCER CENTER ENTRANCE of BUILDING A. Once you arrive, please register at the Cancer Center  on the 1st floor.  Masks are optional for all patients and visitors, unless otherwise indicated.               6/24/2024  8:00 AM  CHEMOTHERAPY [[id number]] 60 min [facility] in [place] PF TX RN2    Patient Instructions:         Location Instructions:     Your appointment is scheduled at the [facility] in [place]. The address is [address] [place] [address]. Please park in the GREEN LOT and enter through the CANCER CENTER ENTRANCE of BUILDING A.. Once you arrive, please register at the Cancer Center  on the 1st floor.  Masks are optional for all patients and visitors, unless otherwise indicated. No care partners/visitors under 18 years of age are allowed in the infusion room.               6/24/2024  8:30 AM  ON TREATMENT VISIT FOLLOW-UP [[id number]] 15 min [facility] in [place] Grecia Angelo APRN    Patient Instructions:         Location Instructions:     **IF YOU NEED LABWORK OR AN INFUSION ALONG WITH YOUR APPOINTMENT, YOU MUST CALL TO SCHEDULE.**  Your appointment is scheduled at the [facility] in [place]. The address is [address] [place] [address]. Please park in the GREEN LOT and enter through the CANCER CENTER ENTRANCE of BUILDING A. Once you arrive, please register at the Cancer Center  on the 1st floor.  Masks are optional for all patients and visitors, unless otherwise indicated.                      Vital  signs:  Temp:  [97.2 °F (36.2 °C)-97.8 °F (36.6 °C)] 97.5 °F (36.4 °C)  Pulse:  [50-56] 51  Resp:  [11-20] 20  BP: (111-171)/(67-86) 159/76  SpO2:  [95 %-100 %] 100 %    Physical Exam:    General: No acute distress   Lungs: clear to auscultation  Cardiovascular: S1, S2  Abdomen: Soft      -----------------------------------------------------------------------------------------------  PATIENT DISCHARGE INSTRUCTIONS: See electronic chart    Young Ham DO    Total time spent on discharge plannin minutes     The  Century Cures Act makes medical notes like these available to patients in the interest of transparency. Please be advised this is a medical document. Medical documents are intended to carry relevant information, facts as evident, and the clinical opinion of the practitioner. The medical note is intended as peer to peer communication and may appear blunt or direct. It is written in medical language and may contain abbreviations or verbiage that are unfamiliar.

## 2024-06-19 NOTE — ED PROVIDER NOTES
Patient Seen in: [facility] Emergency Department      History     Chief Complaint   Patient presents with    Chest Pain Angina    Swelling Edema     Stated Complaint: CP, swollen leg here to R/O PE and DVT    Subjective:   HPI    53-year-old male brought in by his wife who reports he has been having some disorientation and confusion over the last several days since his most recent episode of chemotherapy.  He is also been reporting persistent chest pains since his surgery at [facility].  More recently he is also been complaining of pain in the right leg.  No trauma reported.  No fevers.    Objective:   Past Medical History:    Back problem    Belching    Black stools    Borderline diabetes    Dx in 8/2013 - HgA1C 6.2%    C. difficile diarrhea    pt treated and without symptoms    Chest pain    Coronary artery disease    On 8/16/13: CABG x 4 with LIMA to LAD and SVG to diagonal, OM and PDA    Decorative tattoo    Depression    Difficult intubation    h/o esophagectomy for CA; developed esophagobronchial vistula    Disorder of liver    LIVER CA    Esophageal cancer (HCC)    completed chemo    Esophageal reflux    Essential hypertension    Exposure to medical diagnostic radiation    last tx 8/18/2022    Frequent urination    Gastroparesis    Heartburn    High blood pressure    High cholesterol    Found when I had quadruple bypass    History of COVID-19    asymptomatic - pt was dx during a hospitalization for another diagnosis. No continued symptoms    History of stomach ulcers    Hyperlipidemia    Hyperlipidemia LDL goal < 70    Indigestion    Morbid obesity with BMI of 40.0-44.9, adult (HCC)    Muscle weakness    Nausea vomiting and diarrhea    Nontoxic multinodular goiter    Dx in 8/2013: pt was told that imaging showed thyroid cysts per PCP    Pancreatic cancer (HCC)    last dose 12/4/2023 is scheduled for another round 12/27/23    Peripheral vascular disease (HCC)    pt denies    Personal history of  antineoplastic chemotherapy    for esophageal cancer/completed    Personal history of antineoplastic chemotherapy    pancreatic cancer    Personal history of antineoplastic chemotherapy    Last treatment 3/12/24    Problems with swallowing    Pulmonary nodules    Dx in 8/2013: CT chest showed small bilateral fissural-based lung nodules less than 1 cm    S/P CABG x 4    On 8/16/13: CABG x 4 with LIMA to LAD and SVG to diagonal, OM and PDA    Shortness of breath    when coughing; no oxygen    Vomiting              Past Surgical History:   Procedure Laterality Date    Appendectomy      Appendectomy      Arthroscopy of joint unlisted      right shoulder    Cabg      On 8/16/13: CABG x 4 with LIMA to LAD and SVG to diagonal, OM and PDA    Cardiac cath lab      On 8/14/2013: cardiac cath showed 3-vessel disease    Other surgical history      1.       Laparoscopic robotic-assisted esophagogastrectomy.    Port, indwelling, imp                  Social History     Socioeconomic History    Marital status:     Number of children: 3   Occupational History    Occupation: works as  - on workman's comp   Tobacco Use    Smoking status: Former     Current packs/day: 0.00     Average packs/day: 1 pack/day for 27.0 years (27.0 ttl pk-yrs)     Types: Cigarettes     Start date: 8/15/1986     Quit date: 8/15/2013     Years since quitting: 10.8    Smokeless tobacco: Never    Tobacco comments:     Quit smoking 2013   Vaping Use    Vaping status: Never Used   Substance and Sexual Activity    Alcohol use: Never    Drug use: Never    Sexual activity: Not Currently     Partners: Female   Other Topics Concern    Caffeine Concern Yes    Stress Concern No    Weight Concern No    Special Diet No    Exercise No    Seat Belt No   Social History Narrative    Lives with life partner    Has 3 daughters - 1 lives closeby, 1 in [place], 1 in [place]     Social Determinants of Health     Financial Resource Strain: Low Risk  (5/31/2024)     Received from [facility]    Overall Financial Resource Strain (CARDIA)     Difficulty of Paying Living Expenses: Not hard at all   Food Insecurity: No Food Insecurity (6/18/2024)    Food Insecurity     Food Insecurity: Never true   Transportation Needs: No Transportation Needs (6/18/2024)    Transportation Needs     Lack of Transportation: No   Housing Stability: Low Risk  (6/18/2024)    Housing Stability     Housing Instability: No     Housing Instability Emergency: No              Review of Systems    Positive for stated complaint: CP, swollen leg here to R/O PE and DVT  Other systems are as noted in HPI.  Constitutional and vital signs reviewed.      All other systems reviewed and negative except as noted above.    Physical Exam     ED Triage Vitals [06/18/24 1504]   /67   Pulse 51   Resp 14   Temp 97.2 °F (36.2 °C)   Temp src Oral   SpO2 95 %   O2 Device None (Room air)       Current Vitals:   Vital Signs  BP: 141/76  Pulse: 51  Resp: 16  Temp: 97.8 °F (36.6 °C)  Temp src: Oral  MAP (mmHg): 93    Oxygen Therapy  SpO2: 100 %  O2 Device: None (Room air)  O2 Flow Rate (L/min): 0 L/min  Pulse Oximetry Type: Continuous  Oximetry Probe Site Changed: No  Pulse Ox Probe Location: Left hand            Physical Exam    General:  Vitals as listed.  No acute distress   HEENT: Sclerae anicteric.  Conjunctivae show no pallor.  Oropharynx clear, mucous membranes moist   Neck: supple, no rigidity   Lungs: good air exchange and clear   Heart: regular rate rhythm and no murmur   Abdomen: Soft and nontender.  No abdominal masses.  No peritoneal signs   Extremities: The right lower extremity is mildly erythematous and edematous.  Normal peripheral pulses   Neuro: Alert oriented and nonfocal   Skin: no rashes or nodules    ED Course     Labs Reviewed   COMP METABOLIC PANEL (14) - Abnormal; Notable for the following components:       Result Value    Glucose 105 (*)     Alkaline Phosphatase 122 (*)      Albumin 3.1 (*)     A/G Ratio 0.7 (*)     All other components within normal limits   CBC W/ DIFFERENTIAL - Abnormal; Notable for the following components:    RBC 3.84 (*)     HGB 11.3 (*)     HCT 36.4 (*)     Lymphocyte Absolute 0.78 (*)     All other components within normal limits   TROPONIN I HIGH SENSITIVITY - Normal   PTT, ACTIVATED - Normal   CBC WITH DIFFERENTIAL WITH PLATELET    Narrative:     The following orders were created for panel order CBC With Differential With Platelet.  Procedure                               Abnormality         Status                     ---------                               -----------         ------                     CBC W/ DIFFERENTIAL[[id number]]          Abnormal            Final result                 Please view results for these tests on the individual orders.   PTT, ACTIVATED   RAINBOW DRAW LAVENDER   RAINBOW DRAW LIGHT GREEN   RAINBOW DRAW BLUE     EKG    Rate, intervals and axes as noted on EKG Report.  Rate: 50  Rhythm: Sinus Rhythm  Reading: No evidence of acute ischemia                 CT CHEST PE AORTA (IV ONLY) (CPT=71260)    Result Date: 6/18/2024  PROCEDURE:  CT CHEST PE AORTA (IV ONLY) (CPT=71260)  COMPARISON:  [facility] , CT, CT BRAIN OR HEAD (77090), 6/18/2024, 8:30 PM.  [facility] , CT, CT CHEST PE AORTA (IV ONLY) (CPT=71260), 4/16/2024, 6:24 AM.  INDICATIONS:  CP, swollen leg here to R/O PE and DVT  TECHNIQUE:  CT images were obtained with non-ionic intravenous contrast material. Dose reduction techniques were used. Dose information is transmitted to the ACR (American College of Radiology) NRDR (National Radiology Data Registry) which includes the Dose Index Registry.  PATIENT STATED HISTORY:(As transcribed by Technologist)  Right sided chest pain.   CONTRAST USED:  100cc of Isovue 370  FINDINGS:  LUNGS:  Peripheral interstitial abnormalities are noted.  Reticular nodular densities at the lung bases are noted.  Minimal ground-glass densities are identified.   Mild bronchial wall thickening is noted.  Ground-glass reticular nodular densities in left  lower lobe may be due to sequelae of pulmonary emboli.  Infectious process is also of consideration. VASCULATURE:  Nonocclusive pulmonary embolus in the left upper lobe segmental arteries is noted.  Nonocclusive pulmonary emboli in the left lower lobe lobar segmental branches is noted. AIDAN:  No mass or adenopathy.  MEDIASTINUM:  Conglomeration of AP window lymph nodes measures 2.5 x 2.4 cm and is similar to prior exam.  Stent in the stomach/esophagus is no longer identified.  Postoperative changes with partial gastric pull-through is noted. CARDIAC:  Sequelae of CABG PLEURA:  No mass or effusion.  THORACIC AORTA:  Aberrant right subclavian artery is noted. CHEST WALL:  No mass or axillary adenopathy.  LIMITED ABDOMEN:  Limited images of the upper abdomen are unremarkable.  BONES:  No bony lesion or fracture.             CONCLUSION:   1. Nonocclusive pulmonary emboli in the left upper left lower lobe are noted.  2. Sequelae of partial gastric pull-through is noted.  Previously noted stent in the esophagus and stomach is no longer identified.  3. Mediastinal lymphadenopathy is stable.  4. Ground-glass reticular nodular densities in left lower lobe may be sequelae of pulmonary emboli.  This may also be due to developing infectious process.  This critical result was discussed with Dr. Torrez at 2057 hours on 6/18/2024. Read back was performed.    LOCATION:  [facility]   Dictated by (CST): Amilcar Jesus MD on 6/18/2024 at 8:50 PM     Finalized by (CST): Amilcar Jesus MD on 6/18/2024 at 8:57 PM       CT BRAIN OR HEAD (56471)    Result Date: 6/18/2024  PROCEDURE:  CT BRAIN OR HEAD (25794)  COMPARISON:  None.  INDICATIONS:  CP, swollen leg here to R/O PE and DVT  TECHNIQUE:  Noncontrast CT scanning is performed through the brain. Dose reduction techniques were used. Dose information is transmitted to the ACR (American College  of Radiology) NRDR (National Radiology Data Registry) which includes the Dose Index Registry.  PATIENT STATED HISTORY: (As transcribed by Technologist)  Right sided chest pain.     FINDINGS:  There is cerebral atrophy with symmetric prominence of the ventricles. There are patchy areas of low attenuation in the periventricular and deep white matter which are nonspecific but most consistent with small vessel ischemic changes. There is no evidence of hemorrhage, mass, midline shift, or extra-axial fluid collection.  The visualized paranasal sinuses show no significant sinus disease.. No evidence of depressed skull fracture.            CONCLUSION: 1. No acute intracranial findings 2. Cerebral atrophy with chronic microvascular ischemic changes.    LOCATION:  [facility]   Dictated by (CST): Jenny Navarrete MD on 6/18/2024 at 8:40 PM     Finalized by (CST): Jenny Navarrete MD on 6/18/2024 at 8:41 PM       US VENOUS DOPPLER LEG RIGHT - DIAG IMG (CPT=93971)    Result Date: 6/18/2024  PROCEDURE:  US VENOUS DOPPLER LEG RIGHT - DIAG IMG (CPT=93971)  COMPARISON:  [facility], US VENOUS DOPPLER LEG RIGHT - DIAG IMG (CPT=93971), 10/20/2022, 4:45 PM.  INDICATIONS:  CP, swollen leg here to R/O PE and DVT  TECHNIQUE:  Real time, grey scale, and duplex ultrasound was used to evaluate the lower extremity venous system. B-mode two-dimensional images of the vascular structures, Doppler spectral analysis, and color flow.  Doppler imaging were performed.  The following veins were imaged:  Common, deep, and superficial femoral, popliteal, sapheno-femoral junction, posterior tibial veins, and the contralateral common femoral vein.  PATIENT STATED HISTORY: (As transcribed by Technologist)     FINDINGS:  EXTREMITY EXAMINED:  Right lower extremity SAPHENOFEMORAL JUNCTION:  No reflux. THROMBI:  Partially occlusive thrombus in the right distal popliteal and posterior tibial veins. COMPRESSION:  Normal compressibility, phasicity, and augmentation  is otherwise noted. OTHER:  Negative.            CONCLUSION:  Partially occlusive DVT in the distal right popliteal vein and right posterior tibial vein.  This critical result was discussed with Dr. Torrez at 2002 hours on 6/18/2024. Read back was performed.    LOCATION:  [id number]    Dictated by ([facility]): Amilcar Jesus MD on 6/18/2024 at 8:00 PM     Finalized by (CST): Amilcar Jesus MD on 6/18/2024 at 8:02 PM       XR CHEST AP PORTABLE  (CPT=71045)    Result Date: 6/18/2024  PROCEDURE:  XR CHEST AP PORTABLE  (CPT=71045)  TECHNIQUE:  AP chest radiograph was obtained.  COMPARISON:  [facility] , XR, XR CHEST PA + LAT CHEST (CPT=71046), 5/28/2024, 11:47 AM.  [facility] , XR, XR CHEST AP PORTABLE  (CPT=71045), 4/24/2024, 7:44 AM.  INDICATIONS:  CP, swollen leg here to R/O PE and DVT  PATIENT STATED HISTORY: (As transcribed by Technologist)     FINDINGS:  Cardiac size is stable.  Stable left-sided power port.  Small right-sided pleural effusion right basilar opacity.  Surgical clips overlying the mediastinum.  Tiny foci of subcutaneous emphysema along the right lateral chest wall which has improved since prior examination.  Right-sided rib fractures are again noted.            CONCLUSION:  1. Small right-sided pleural effusion with right basilar atelectasis/infiltrates. 2. Stable right-sided rib fracture deformities.    LOCATION:  [facility]      Dictated by ([facility]): Jenny Navarrete MD on 6/18/2024 at 4:51 PM     Finalized by (CST): Jenny Navarrete MD on 6/18/2024 at 4:53 PM            MDM      53-year-old male with history esophageal cancer brought in by family for disorientation as well as pain in the leg and chest pains.  Discoloration to the right leg    Additional history obtained by wife who he has been talking to himself on multiple occasions and saying things that do not make sense     Differential includes but is not limited to brain metastases, drug reaction, DVT, pulmonary embolism, a life threat.    CBC, CMP,  troponin, chest x-ray, ultrasound right lower extremity, CTA chest, CT brain ordered for further evaluation.    My independent interpretation of CT brain is that there are no obvious metastatic lesions.    Radiology reports no acute findings.  Radiology does also report that there is a DVT in the right lower extremity and pulmonary emboli in the left upper lobe.  Heparin ordered for management of acute PE and DVT.  Reviewed all findings with patient and family.  Patient will require hospitalization for further management of symptoms.  Case was discussed with oncology, Dr. Weiss as well as the admitting physician.        Critical care time:  Admission disposition: 6/18/2024  8:59 PM       A total of 40 minutes of critical care time (exclusive of billable procedures) was administered to manage the patient's critical imaging findings due to his pulmonary emboli.  This involved direct patient intervention, complex decision making, and/or extensive discussions with the patient, family, and clinical staff.                                   Medical Decision Making      Disposition and Plan     Clinical Impression:  1. Acute pulmonary embolism without acute cor pulmonale, unspecified pulmonary embolism type (HCC)    2. Acute deep vein thrombosis (DVT) of popliteal vein of right lower extremity (HCC)    3. Altered mental status, unspecified altered mental status type         Disposition:  Admit  6/18/2024  8:59 pm    Follow-up:  No follow-up provider specified.        Medications Prescribed:  Current Discharge Medication List                            Hospital Problems       Present on Admission  Date Reviewed: 6/18/2024            ICD-10-CM Noted POA    * (Principal) Acute pulmonary embolism without acute cor pulmonale, unspecified pulmonary embolism type (HCC) I26.99 6/18/2024 Yes    Acute deep vein thrombosis (DVT) of popliteal vein of right lower extremity (HCC) I82.431 6/18/2024 Yes    Altered mental status,  unspecified altered mental status type R41.82 6/18/2024 Yes

## 2024-06-19 NOTE — CONSULTS
[facility] Consult Note    Reason for Consult: PE/DVT  Medical Record Number: [id number]   CSN: [id number]   Referring Physician: No ref. provider found  PCP: Adrian Horowitz MD    History of Present Illness: 53M with a PMH of metastatic esophageal cancer, CAD s/p CABG, HTN and GERD presented on 6/18/24 with RLE swelling. He has noticed RLE swelling for about a week. No chest pain or dyspnea. Imaging from 6/18/24 showed a a LYNSEY PE and right popliteal and posterior tibial vein DVT. He was started on heparin with improvement. He was last treated with FOLFOX + Herceptin+ Opdivo on 6/11/24.     Review of Systems: 12 Point ROS was completed and pertinent positives are in the HPI    Medications:    Current Facility-Administered Medications:     acetaminophen (Tylenol Extra Strength) tab 500 mg, 500 mg, Oral, Q4H PRN    melatonin tab 3 mg, 3 mg, Oral, Nightly PRN    glycerin-hypromellose- (Artificial Tears) 0.2-0.2-1 % ophthalmic solution 1 drop, 1 drop, Both Eyes, QID PRN    sodium chloride (Saline Mist) 0.65 % nasal solution 1 spray, 1 spray, Each Nare, Q3H PRN    ondansetron (Zofran) 4 MG/2ML injection 4 mg, 4 mg, Intravenous, Q6H PRN    prochlorperazine (Compazine) 10 MG/2ML injection 5 mg, 5 mg, Intravenous, Q8H PRN    diphenhydrAMINE (Benadryl) cap/tab 25 mg, 25 mg, Oral, Nightly PRN    apixaban (Eliquis) tab 10 mg, 10 mg, Oral, BID **FOLLOWED BY** [START ON 6/26/2024] apixaban (Eliquis) tab 5 mg, 5 mg, Oral, BID    heparin (Porcine) 39406 units/250mL infusion PE/DVT/THROMBUS CONTINUOUS, 200-3,000 Units/hr, Intravenous, Continuous    dicyclomine (Bentyl) cap 10 mg, 10 mg, Oral, TID AC and HS    gabapentin (Neurontin) cap 100 mg, 100 mg, Oral, Nightly    losartan (Cozaar) tab 50 mg, 50 mg, Oral, Daily    metoprolol tartrate (Lopressor) tab 25 mg, 25 mg, Oral, 2x Daily(Beta Blocker)    OLANZapine (ZyPREXA) tab 5 mg, 5 mg, Oral, Nightly    oxyCODONE immediate release tab 5-10 mg, 5-10 mg, Oral, Q4H  PRN    pancrelipase (Lip-Prot-Amyl) (Zenpep) DR particles cap 25,000 Units, 25,000 Units, Oral, TID CC    pantoprazole (Protonix) DR tab 40 mg, 40 mg, Oral, QAM AC    prochlorperazine (Compazine) tab 10 mg, 10 mg, Oral, Q6H PRN    sertraline (Zoloft) tab 150 mg, 150 mg, Oral, Daily    Past Medical History:    Back problem    Belching    Black stools    Borderline diabetes    Dx in 8/2013 - HgA1C 6.2%    C. difficile diarrhea    pt treated and without symptoms    Chest pain    Coronary artery disease    On 8/16/13: CABG x 4 with LIMA to LAD and SVG to diagonal, OM and PDA    Decorative tattoo    Depression    Difficult intubation    h/o esophagectomy for CA; developed esophagobronchial vistula    Disorder of liver    LIVER CA    Esophageal cancer (HCC)    completed chemo    Esophageal reflux    Essential hypertension    Exposure to medical diagnostic radiation    last tx 8/18/2022    Frequent urination    Gastroparesis    Heartburn    High blood pressure    High cholesterol    Found when I had quadruple bypass    History of COVID-19    asymptomatic - pt was dx during a hospitalization for another diagnosis. No continued symptoms    History of stomach ulcers    Hyperlipidemia    Hyperlipidemia LDL goal < 70    Indigestion    Morbid obesity with BMI of 40.0-44.9, adult (HCC)    Muscle weakness    Nausea vomiting and diarrhea    Nontoxic multinodular goiter    Dx in 8/2013: pt was told that imaging showed thyroid cysts per PCP    Pancreatic cancer (HCC)    last dose 12/4/2023 is scheduled for another round 12/27/23    Peripheral vascular disease (HCC)    pt denies    Personal history of antineoplastic chemotherapy    for esophageal cancer/completed    Personal history of antineoplastic chemotherapy    pancreatic cancer    Personal history of antineoplastic chemotherapy    Last treatment 3/12/24    Problems with swallowing    Pulmonary nodules    Dx in 8/2013: CT chest showed small bilateral fissural-based lung nodules  less than 1 cm    S/P CABG x 4    On 8/16/13: CABG x 4 with LIMA to LAD and SVG to diagonal, OM and PDA    Shortness of breath    when coughing; no oxygen    Vomiting     Past Surgical History:   Procedure Laterality Date    Appendectomy      Appendectomy      Arthroscopy of joint unlisted      right shoulder    Cabg      On 8/16/13: CABG x 4 with LIMA to LAD and SVG to diagonal, OM and PDA    Cardiac cath lab      On 8/14/2013: cardiac cath showed 3-vessel disease    Other surgical history      1.       Laparoscopic robotic-assisted esophagogastrectomy.    Port, indwelling, imp       Social History     Socioeconomic History    Marital status:     Number of children: 3   Occupational History    Occupation: works as  - on workman's comp   Tobacco Use    Smoking status: Former     Current packs/day: 0.00     Average packs/day: 1 pack/day for 27.0 years (27.0 ttl pk-yrs)     Types: Cigarettes     Start date: 8/15/1986     Quit date: 8/15/2013     Years since quitting: 10.8    Smokeless tobacco: Never    Tobacco comments:     Quit smoking 2013   Vaping Use    Vaping status: Never Used   Substance and Sexual Activity    Alcohol use: Never    Drug use: Never    Sexual activity: Not Currently     Partners: Female      Family History   Problem Relation Age of Onset    Cancer Mother         breast and colon     Diabetes Neg        Physical Exam  /76 (BP Location: Left arm)   Pulse 51   Temp 97.5 °F (36.4 °C) (Oral)   Resp 20   Ht 1.88 m (6' 2\")   Wt 75.7 kg (166 lb 14.2 oz)   SpO2 100%   BMI 21.43 kg/m²      General: NAD, AOX3  HEENT: clear op, mmm, no jvd. EOM intact, no scleral icterus   CV: RRR S1S2 no murmurs  Extremities: RLE swelling   Lungs: CTAB, no increased work of breathing  Abd: soft nt nd +BS no hepatosplenomegaly  Neuro: CN: II-XII grossly intact  Psych: Normal Mood and affect     Results:  Lab Results   Component Value Date    WBC 6.8 06/18/2024    HGB 11.3 (L) 06/18/2024     HCT 36.4 (L) 06/18/2024    MCV 94.8 06/18/2024    .0 06/18/2024    EOSABS 0.38 04/20/2024     Lab Results   Component Value Date     06/18/2024    K 3.8 06/18/2024    CO2 29.0 06/18/2024     06/18/2024    BUN 9 06/18/2024    PHOS 2.9 03/15/2024    ALB 3.1 (L) 06/18/2024       Radiology: reviewed     Assessment and Plan:  LYNSEY PE/RLE DVT: dx 6/19/24. Currently on hep gtt. Transition to Eliquis today. This will likely be better absorbed given his previous esophagectomy. Eliquis is mainly absorbed in the small intestine.     Metastatic Esophageal Cancer, HER2+: Currently on FOLFOX + Herceptin+ Opdivo. Has follow up on 6/24/24.     Ok to discharge from an oncology standpoint. Oncology will sign off.     [facility]

## 2024-06-19 NOTE — PLAN OF CARE
Discharge Note    Explained discharge instructions, including medications and follow up appointments to patient and wife at bedside. Verbalized understanding, IV removed, pt transported to [facility] via wheelchair.

## 2024-06-19 NOTE — DIETARY NOTE
[facility]   part of [facility]    NUTRITION ASSESSMENT    Pt meets moderate malnutrition criteria at this time.    NUTRITION INTERVENTION:    Meal and Snacks - liberalized diet.  Monitor and encourage adequate PO intake.   Medical Food Supplements - Ensure Plus High Protein BID. Rationale/use for oral supplements discussed.      PATIENT STATUS:  53 year old male with past medical history significant for metastatic esophageal cancer, CAD with prev CABG, HTN, DL, GERD/PUD, depression/anxiety presented 6/18 with c/o chest pain and right lower extremity pain and swelling.  Found to have acute PE and right lower extremity DVT.  \Chart reviewed due to [employer]. 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 Had his jejunostomy tube removed on 6/7/24- was not longer using.  Pt still with mild/moderate muscle and fat depletion . NKFA. No concerns chewing or swallowing. On RA    ANTHROPOMETRICS:  Ht: 188 cm (6' 2\")  Wt: 75.7 kg (166 lb 14.2 oz).   BMI: Body mass index is 21.43 kg/m².  IBW: 86.4 kg      WEIGHT HISTORY:   Weight loss: none  Wt Readings from Last 20 Encounters:   06/19/24 75.7 kg (166 lb 14.2 oz)   06/11/24 78.5 kg (173 lb)   06/07/24 76.7 kg (169 lb 3.2 oz)   06/03/24 76 kg (167 lb 8 oz)   05/29/24 74.6 kg (164 lb 8 oz)   05/28/24 72.6 kg (160 lb)   05/15/24 70.3 kg (155 lb)   04/24/24 67.2 kg (148 lb 3.2 oz)   04/24/24 67.1 kg (148 lb)   04/17/24 68.9 kg (152 lb)   04/08/24 68.4 kg (150 lb 14.4 oz)   03/25/24 78.7 kg (173 lb 8 oz)   03/20/24 77.1 kg (170 lb)   03/14/24 77.1 kg (170 lb)   03/12/24 82.6 kg (182 lb)   03/07/24 82.4 kg (181 lb 10.5 oz)   02/22/24 79.6 kg (175 lb 8 oz)   02/20/24 80.3 kg (177 lb)   02/19/24 80.5 kg (177 lb 8 oz)   02/14/24 80.3 kg (177 lb)       NUTRITION:  Diet:       Procedures    Regular/General diet Is Patient on Accuchecks? No      Food Allergies: No  Cultural/Ethnic/Anabaptism Preferences Addressed: Yes    Percent Meals  Eaten (last 3 days)       None            GI system review: WNL Last BM: 6/19  Skin and wounds: none    NUTRITION RELATED PHYSICAL FINDINGS:     1. Body Fat/Muscle Mass: moderate depletion body fat Orbital fat pad and Buccal fat pad and moderate muscle depletion Temple region and Clavicle region     2. Fluid Accumulation: none     NUTRITION PRESCRIPTION: 68.9 kg  Calories: 6694-0733 calories/day (30-35 kcal/kg)  Protein:  grams protein/day (1.2-1.5 grams protein per kg)  Fluid: ~1 ml/kcal or per MD discretion    NUTRITION DIAGNOSIS/PROBLEM:  Predicted suboptimal energy intake related to physiological causes as evidenced by documented/reported unintentional weight loss, loss of fat mass, and loss of muscle mass      MONITOR AND EVALUATE/NUTRITION GOALS:  PO intake of 75% of meals TID - New  PO intake of 75% of oral nutrition supplement/s - New  Weight stable within 1 to 2 lbs during admission - New      MEDICATIONS:  protonix    LABS:  Reviewed    Pt is at Moderate nutrition risk    Estrellita Duggan RD, LDN  Clinical Nutrition  [id number]

## 2024-06-19 NOTE — ED QUICK NOTES
Orders for admission, patient is aware of plan and ready to go upstairs. Any questions, please call ED RN King at extension [phone].     Patient Covid vaccination status: Fully vaccinated and immunocompromised     COVID Test Ordered in ED: None    COVID Suspicion at Admission: N/A    Running Infusions:    [START ON 6/19/2024] continuous dose heparin          Mental Status/LOC at time of transport: A/Ox3, visual hallucinations    Other pertinent information:   CIWA score: N/A   NIH score:  N/A

## 2024-06-20 ENCOUNTER — PATIENT OUTREACH (OUTPATIENT)
Dept: CASE MANAGEMENT | Age: 54
End: 2024-06-20

## 2024-06-21 DIAGNOSIS — G89.12 POST-THORACOTOMY PAIN: Primary | ICD-10-CM

## 2024-06-21 RX ORDER — HYDROCODONE BITARTRATE AND ACETAMINOPHEN 5; 325 MG/1; MG/1
1-2 TABLET ORAL EVERY 4 HOURS PRN
Qty: 30 TABLET | Refills: 0 | Status: SHIPPED | OUTPATIENT
Start: 2024-06-21 | End: 2024-06-21 | Stop reason: CLARIF

## 2024-06-24 ENCOUNTER — APPOINTMENT (OUTPATIENT)
Dept: HEMATOLOGY/ONCOLOGY | Age: 54
End: 2024-06-24
Attending: INTERNAL MEDICINE
Payer: COMMERCIAL

## 2024-06-25 DIAGNOSIS — G89.3 NEOPLASM RELATED PAIN: ICD-10-CM

## 2024-06-25 RX ORDER — OXYCODONE HYDROCHLORIDE 5 MG/1
5 TABLET ORAL EVERY 4 HOURS PRN
Qty: 90 TABLET | Refills: 0 | Status: SHIPPED | OUTPATIENT
Start: 2024-06-25

## 2024-06-25 RX ORDER — OXYCODONE HYDROCHLORIDE 5 MG/1
TABLET ORAL EVERY 4 HOURS PRN
Qty: 90 TABLET | Refills: 0 | Status: CANCELLED | OUTPATIENT
Start: 2024-06-25

## 2024-06-27 ENCOUNTER — PATIENT MESSAGE (OUTPATIENT)
Dept: HEMATOLOGY/ONCOLOGY | Age: 54
End: 2024-06-27

## 2024-06-27 ENCOUNTER — OFFICE VISIT (OUTPATIENT)
Dept: HEMATOLOGY/ONCOLOGY | Age: 54
End: 2024-06-27
Attending: INTERNAL MEDICINE
Payer: COMMERCIAL

## 2024-06-27 VITALS
SYSTOLIC BLOOD PRESSURE: 197 MMHG | WEIGHT: 173.5 LBS | RESPIRATION RATE: 16 BRPM | DIASTOLIC BLOOD PRESSURE: 83 MMHG | HEIGHT: 73.82 IN | OXYGEN SATURATION: 100 % | BODY MASS INDEX: 22.27 KG/M2 | TEMPERATURE: 98 F | HEART RATE: 68 BPM

## 2024-06-27 DIAGNOSIS — I26.99 ACUTE PULMONARY EMBOLISM WITHOUT ACUTE COR PULMONALE, UNSPECIFIED PULMONARY EMBOLISM TYPE (HCC): ICD-10-CM

## 2024-06-27 DIAGNOSIS — G89.3 CANCER RELATED PAIN: ICD-10-CM

## 2024-06-27 DIAGNOSIS — C15.5 MALIGNANT NEOPLASM OF LOWER THIRD OF ESOPHAGUS (HCC): Primary | ICD-10-CM

## 2024-06-27 DIAGNOSIS — G89.12 POST-THORACOTOMY PAIN: ICD-10-CM

## 2024-06-27 LAB
ALBUMIN SERPL-MCNC: 2.6 G/DL (ref 3.4–5)
ALBUMIN/GLOB SERPL: 0.8 {RATIO} (ref 1–2)
ALP LIVER SERPL-CCNC: 117 U/L
ALT SERPL-CCNC: 30 U/L
ANION GAP SERPL CALC-SCNC: 2 MMOL/L (ref 0–18)
AST SERPL-CCNC: 27 U/L (ref 15–37)
BASOPHILS # BLD AUTO: 0.02 X10(3) UL (ref 0–0.2)
BASOPHILS NFR BLD AUTO: 0.4 %
BILIRUB SERPL-MCNC: 0.2 MG/DL (ref 0.1–2)
BUN BLD-MCNC: 10 MG/DL (ref 9–23)
CALCIUM BLD-MCNC: 8.2 MG/DL (ref 8.5–10.1)
CANCER AG19-9 SERPL-ACNC: 4332.4 U/ML (ref ?–37)
CHLORIDE SERPL-SCNC: 110 MMOL/L (ref 98–112)
CO2 SERPL-SCNC: 28 MMOL/L (ref 21–32)
CREAT BLD-MCNC: 0.6 MG/DL
EGFRCR SERPLBLD CKD-EPI 2021: 115 ML/MIN/1.73M2 (ref 60–?)
EOSINOPHIL # BLD AUTO: 0.15 X10(3) UL (ref 0–0.7)
EOSINOPHIL NFR BLD AUTO: 2.7 %
ERYTHROCYTE [DISTWIDTH] IN BLOOD BY AUTOMATED COUNT: 14 %
GLOBULIN PLAS-MCNC: 3.4 G/DL (ref 2.8–4.4)
GLUCOSE BLD-MCNC: 74 MG/DL (ref 70–99)
HCT VFR BLD AUTO: 34.5 %
HGB BLD-MCNC: 10.8 G/DL
IMM GRANULOCYTES # BLD AUTO: 0.04 X10(3) UL (ref 0–1)
IMM GRANULOCYTES NFR BLD: 0.7 %
LYMPHOCYTES # BLD AUTO: 0.77 X10(3) UL (ref 1–4)
LYMPHOCYTES NFR BLD AUTO: 13.6 %
MCH RBC QN AUTO: 29.5 PG (ref 26–34)
MCHC RBC AUTO-ENTMCNC: 31.3 G/DL (ref 31–37)
MCV RBC AUTO: 94.3 FL
MONOCYTES # BLD AUTO: 0.63 X10(3) UL (ref 0.1–1)
MONOCYTES NFR BLD AUTO: 11.1 %
NEUTROPHILS # BLD AUTO: 4.05 X10 (3) UL (ref 1.5–7.7)
NEUTROPHILS # BLD AUTO: 4.05 X10(3) UL (ref 1.5–7.7)
NEUTROPHILS NFR BLD AUTO: 71.5 %
OSMOLALITY SERPL CALC.SUM OF ELEC: 288 MOSM/KG (ref 275–295)
PLATELET # BLD AUTO: 210 10(3)UL (ref 150–450)
POTASSIUM SERPL-SCNC: 3.5 MMOL/L (ref 3.5–5.1)
PROT SERPL-MCNC: 6 G/DL (ref 6.4–8.2)
RBC # BLD AUTO: 3.66 X10(6)UL
SODIUM SERPL-SCNC: 140 MMOL/L (ref 136–145)
WBC # BLD AUTO: 5.7 X10(3) UL (ref 4–11)

## 2024-06-27 PROCEDURE — 96415 CHEMO IV INFUSION ADDL HR: CPT

## 2024-06-27 PROCEDURE — 96375 TX/PRO/DX INJ NEW DRUG ADDON: CPT

## 2024-06-27 PROCEDURE — 96413 CHEMO IV INFUSION 1 HR: CPT

## 2024-06-27 PROCEDURE — 86301 IMMUNOASSAY TUMOR CA 19-9: CPT

## 2024-06-27 PROCEDURE — 99215 OFFICE O/P EST HI 40 MIN: CPT | Performed by: NURSE PRACTITIONER

## 2024-06-27 PROCEDURE — 96368 THER/DIAG CONCURRENT INF: CPT

## 2024-06-27 PROCEDURE — 80053 COMPREHEN METABOLIC PANEL: CPT

## 2024-06-27 PROCEDURE — 85025 COMPLETE CBC W/AUTO DIFF WBC: CPT

## 2024-06-27 PROCEDURE — S0028 INJECTION, FAMOTIDINE, 20 MG: HCPCS | Performed by: NURSE PRACTITIONER

## 2024-06-27 PROCEDURE — 96417 CHEMO IV INFUS EACH ADDL SEQ: CPT

## 2024-06-27 RX ORDER — HYDROCODONE BITARTRATE AND ACETAMINOPHEN 10; 325 MG/1; MG/1
1 TABLET ORAL ONCE
Status: CANCELLED
Start: 2024-06-27 | End: 2024-06-27

## 2024-06-27 RX ORDER — METHYLPREDNISOLONE SODIUM SUCCINATE 125 MG/2ML
125 INJECTION, POWDER, LYOPHILIZED, FOR SOLUTION INTRAMUSCULAR; INTRAVENOUS ONCE
Status: COMPLETED | OUTPATIENT
Start: 2024-06-27 | End: 2024-06-27

## 2024-06-27 RX ORDER — METHYLPREDNISOLONE SODIUM SUCCINATE 125 MG/2ML
125 INJECTION, POWDER, LYOPHILIZED, FOR SOLUTION INTRAMUSCULAR; INTRAVENOUS ONCE
Status: CANCELLED | OUTPATIENT
Start: 2024-06-27 | End: 2024-06-27

## 2024-06-27 RX ORDER — FLUOROURACIL 50 MG/ML
2400 INJECTION, SOLUTION INTRAVENOUS CONTINUOUS
Status: DISCONTINUED | OUTPATIENT
Start: 2024-06-27 | End: 2024-06-27

## 2024-06-27 RX ORDER — FLUOROURACIL 50 MG/ML
2400 INJECTION, SOLUTION INTRAVENOUS CONTINUOUS
Status: CANCELLED | OUTPATIENT
Start: 2024-06-27

## 2024-06-27 RX ORDER — FAMOTIDINE 10 MG/ML
20 INJECTION, SOLUTION INTRAVENOUS ONCE
Status: COMPLETED | OUTPATIENT
Start: 2024-06-27 | End: 2024-06-27

## 2024-06-27 RX ORDER — FAMOTIDINE 10 MG/ML
20 INJECTION, SOLUTION INTRAVENOUS ONCE
Status: CANCELLED | OUTPATIENT
Start: 2024-06-27 | End: 2024-06-27

## 2024-06-27 RX ORDER — HYDROCODONE BITARTRATE AND ACETAMINOPHEN 10; 325 MG/1; MG/1
1 TABLET ORAL ONCE
Status: COMPLETED | OUTPATIENT
Start: 2024-06-27 | End: 2024-06-27

## 2024-06-27 RX ORDER — DIPHENHYDRAMINE HYDROCHLORIDE 50 MG/ML
25 INJECTION INTRAMUSCULAR; INTRAVENOUS ONCE
Status: COMPLETED | OUTPATIENT
Start: 2024-06-27 | End: 2024-06-27

## 2024-06-27 RX ORDER — DIPHENHYDRAMINE HYDROCHLORIDE 50 MG/ML
25 INJECTION INTRAMUSCULAR; INTRAVENOUS ONCE
Status: CANCELLED | OUTPATIENT
Start: 2024-06-27 | End: 2024-06-27

## 2024-06-27 RX ADMIN — HYDROCODONE BITARTRATE AND ACETAMINOPHEN 1 TABLET: 10; 325 TABLET ORAL at 12:54:00

## 2024-06-27 RX ADMIN — DIPHENHYDRAMINE HYDROCHLORIDE 25 MG: 50 INJECTION INTRAMUSCULAR; INTRAVENOUS at 11:26:00

## 2024-06-27 RX ADMIN — FAMOTIDINE 20 MG: 10 INJECTION, SOLUTION INTRAVENOUS at 11:27:00

## 2024-06-27 RX ADMIN — FLUOROURACIL 5000 MG: 50 INJECTION, SOLUTION INTRAVENOUS at 15:03:00

## 2024-06-27 RX ADMIN — METHYLPREDNISOLONE SODIUM SUCCINATE 125 MG: 125 INJECTION, POWDER, LYOPHILIZED, FOR SOLUTION INTRAMUSCULAR; INTRAVENOUS at 11:24:00

## 2024-06-27 NOTE — PROGRESS NOTES
Pt here for C20D1 Drug(s)Folfox, Trazimera, Opdivo.  Arrives Ambulating independently, accompanied by Self     Patient was evaluated today by LD.    Oral medications included in this regimen:  no    Patient confirms comprehension of cancer treatment schedule:  yes    Pregnancy screening:  Not applicable    Modifications in dose or schedule:  No    Medications appearance and physical integrity checked by RN: yes.    Chemotherapy IV pump settings verified by 2 RNs:  Yes.  Frequency of blood return and site check throughout administration: Prior to administration     Infusion/treatment outcome:  patient tolerated treatment without incident    Education Record    Learner:  Patient  Barriers / Limitations:  None  Method:  Discussion  Education / instructions given:  POC  Outcome:  Shows understanding    Discharged Home, Ambulating independently, accompanied by:Self    Patient/family verbalized understanding of future appointments: by [employer] messaging     Pt tolerated treatment - at about 15 minutes into his oxaliplatin he had complained of 10/10 abdominal pain. Norco administered and patient monitored with drug on hold for 15 minutes before restarting. Rechallenge successful and patient left with pump in stable condition.

## 2024-06-27 NOTE — PROGRESS NOTES
Cancer Center Progress Note    Patient Name: Dontae Cortez Jr.   YOB: 1970   Medical Record Number: [id number]   Date of visit: 6/27/2024    Chief Complaint/Reason for Visit:  Chief Complaint   Patient presents with    Chemotherapy     C20D1 FOLFOX/Trazimera       History of Present Illness: Ronen presents today for follow up and chemo-immunotherapy. He has metastatic esophageal cancer and his medical oncologist is Dr. Senia Foster. Patient is receiving chemotherapy and immunotherapy with FOLFOX, trastuzumab and nivolumab. Patient was recently hospitalized 6/18/2024-6/19/2024 for acute PE with RLE DVT. He was transitioned to apixaban upon discharge.     Today, patient reports feeling well overall. He reports continued right foot and ankle swelling with pain. He denies recent fever, nausea or vomiting, diarrhea or constipation. He reports he is eating and drinking well. Peripheral neuropathy is stable.     Diagnosis:   Metastatic Esophageal Cancer. Possible 2nd pancreatic primary?  -Initially cT3 N1 Esophageal Adenocarcinoma (stage IIIB)  -HER2 amplified by FISH     Treatment History:   1. Neoadjuvant chemoRT with carbo-taxol: 7/6/22-8/10/22     2. laparoscopic robotic-assisted esophagogastrectomy with feeding jejunostomy tube placement on 9/30/2022.      3. Adjuvant Opdivo: 1/9/23-2/20/23     ---Metastatic disease developed-----     5. Chemo-Herceptin-Immunotherapy: 5/1/23-09/2023 --> Switched to Herceptin/Immunotherapy maintenance on 9/25/23 due to negative signatera. FOLFOX and herceptin restarted on 2/5/24 due to progression        Oncology History   -2018: Per report had a normal EGD and colonoscopy     -June 2022: He met with GI due to new onset dysphagia which started about 1 month prior to his visit.  He had an esophagram with a focal abrupt narrowing with irregular margins in the distal one third of the esophagus extending to the GE junction.  He also had an episode of food impaction.  He has also lost about 15 lb. He was a heavy smoker from age 14 to about 40 (1.5 PPD). Also with alcohol use currently. Mother with a history of breast and colon cancer.      -EGD and EUS with Dr. Yadav on 6/7/2022: Severe esophagitis with a concerning mass extending 37-39 centimeters from the incisors. Nonobstructive mass.  By EUS uT3N1 disease.  Pathology showed invasive moderate to poorly differentiated adenocarcinoma.  HER2 amplified by FISH     -PET/CT on 6/20/2022: Increased distal esophageal uptake with an SUV of 14.4.  Concern for shreya metastases.     -Concurrent chemoradiation with carboplatin AUC2 plus paclitaxel 50 mg/m2: 7/6/22-8/10/22:  went from 137 to 30.9.      -PET/CT on 9/6/22: showed overall improvement     -Surgery with Dr. Lu on 9/30/22: ypT1b pN0. Recovery has been complicated by an esophageal leak     -Met with Dr. Foster on 12/12/22. We discussed adjuvant opdivo for 1 year. His  was elevated to 48 and repeat was 64. CT CAP was recommended.      -He was admitted on 12/25/22 for abdominal pain. He had a POEM procedure done. He was also noted to have a RLL consolidation. He was seen by pulmonary, based on imaging an outpatient PET/CT was recommended and a biopsy of the most FDG avid lesion would be considered. Noted to have CAD and an outpatient evaluation was recommended.      -PET/CT on 1/4/23-imaging reviewed in tumor board and no focal areas of concern and recommendation was to start immunotherapy.      -Adjuvant Opdivo: 1/19/2023-3/6/23. During this time, his tumor marker was rising but imaging was negative. Imaging from 4/10/23 was concerning a pancreatic head mass. HE underwent and EGD on 4/7/232 with dr. Hinson. 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 Her 2 IHC was 2+ on this specimen but FISH was negative. Due to these findings, treatment for metastatic esophageal  cancer was considered and he was started on FOLFOX + Herceptin + IO.      FOLFOX + Herceptin + Immunotherapy  -C1: 5/1/23:   -C2: 5/15/23:  1454:   -C3: 6/5/23: Switched to Xeloda, Oxaliplatin, Herceptin, Keytruda due to national 5-FU shortage:  329  -C4: 7/10/23: Delayed to the thrombocytopenia:  79 to 35 Will switch back to 5-FU  -C5: 7/24/23:  21.5  -PET/CT from 7/27/23 showed resolution of disease   -C6: 8/7/23:  15.1  -C7: 8/28/23: Ca19-9 Held Oxaliplatin d/t neuropathy.  13.9  -C8: 9/11/23:  12.3 Oxaliplatin held. Signatera negative     -Herceptin and Keytruda maintenance q3 weeks since Signatera was negative   -C1: 9/25/23:  10.3  -C2: 10/9/23: Ca19-9 15. 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 Also noted to have diffuse GGO and MS/Hilar LAD-unclear if this related to previous infections. Given findings, restarting FOLFOX + herceptin + IO recommended.      -admitted from 1/8/24-1/13/24 for persistent dysphagia and bronchesophageal stent. Plan is for an ASD occluder and removal of the bronchial stent as an outpatient.      -1/17/24: EGD with fistula closure with ASD occluder and removal of the bronchial stent.     -1/22/24: Hercepin + IO treatment only since he was having sternotomy wires removed later in the week      -FOLFOX + Herceptin + Opdivo restarted                -C1: 2/5/24:  846               -C2: 2/22/24:  was not drawn     -Admitted for aspiration PNA on 3/6/24-CT Chest/Abdomen showed extensive patchy consolidative and tree in bud appearance.  There are some solid nodular components.. No new sites of disease in abdomen. Stable hepatic lesion. Of note, a right mainstain lesion was biopsied by pulmonary on 2/20/24 and was negative for cancer.                   -C3: 3/12/24:  424     -Admitted on 3/15/24 with dysphagia. This started 1 day  after chemotherapy. CT AP showed stable metastatic disease. Non specific left side small bowl mesentery nodularity could mesenteric panniculitis vs. Disease.     - Admitted 3/20/2024 for nausea/vomiting with malpositioned ASD occluder device. On 3/20/2024 he had a bronchoscopy with lavage and occluder device placement in the bronchial tree.     -Treatment delayed again for recurrent hospital admissions     -He underwent a Latissimus dorsi flap reconstruction for his bronchoesophageal fistula on 5/1/24     -PET/CT 5/15/24: at least 3 areas of uptake in the right hepatic lobe, uptake in pancrease and retroperitoneum. Post-operative uptake in chest wall.        Problem List:  Patient Active Problem List   Diagnosis    Primary hypertension    Hyperlipidemia with target low density lipoprotein (LDL) cholesterol less than 70 mg/dL    Coronary artery disease involving coronary bypass graft of native heart with angina pectoris (HCC)    S/P CABG x 4    Nontoxic multinodular goiter    Pulmonary nodules    Thrombophlebitis leg    BRANDAN (generalized anxiety disorder)    Right shoulder Arthroscopy acromioplasty ,distal  clavicle resection, rotator cuff/labral debridment  Global 05/13/2021    Right bicipital tenosynovitis    Malignant neoplasm of esophagus (HCC)    Pleural effusion    Esophageal anastomotic leak    Esophageal obstruction    On total parenteral nutrition (TPN)    Mechanical complication of esophagostomy (HCC)    Abdominal pain of unknown etiology    Migration of esophageal stent    Gastroparesis    Esophageal carcinoma (HCC)    Normocytic anemia    Esophageal fistula    Subacute cough    Acquired bronchoesophageal  fistula (HCC)    Pneumonia of both lower lobes due to infectious organism    Malignant neoplasm of pancreas (HCC)    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 difficile colitis    Dysphagia, unspecified type    Dyspnea and respiratory abnormalities    Hypokalemia    Dysphagia    Narcotic drug use    History of esophageal cancer    Palliative care by specialist    Neoplasm related pain    Endobronchial mass    Hyponatremia    Thrombocytopenia (HCC)    Acute kidney injury (HCC)    Hyperglycemia    Aspiration pneumonitis (HCC)    C. difficile diarrhea    Aspiration pneumonia (HCC)    Malignant neoplasm metastatic to liver (HCC)    Hyperlipidemia    Nausea vomiting and diarrhea    Fistula, bronchoesophageal (HCC)    Iron deficiency anemia, unspecified iron deficiency anemia type    Azotemia    Palliative care encounter    Goals of care, counseling/discussion    Cancer related pain    Anemia    Fistula    Acute cough    Pneumonia of right lung due to infectious organism, unspecified part of lung    Bacteremia    Acute postoperative pain    Exocrine pancreatic insufficiency (HCC)    Hypertriglyceridemia    Acute pulmonary embolism without acute cor pulmonale, unspecified pulmonary embolism type (HCC)    Acute deep vein thrombosis (DVT) of popliteal vein of right lower extremity (HCC)    Altered mental status, unspecified altered mental status type        Medical History:  Past Medical History:    Back problem    Belching    Black stools    Borderline diabetes    Dx in 8/2013 - HgA1C 6.2%    C. difficile diarrhea    pt treated and without symptoms    Chest pain    Coronary artery disease    On 8/16/13: CABG x 4 with LIMA  to LAD and SVG to diagonal, OM and PDA    Decorative tattoo    Depression    Difficult intubation    h/o esophagectomy for CA; developed esophagobronchial vistula    Disorder of liver    LIVER CA    Esophageal cancer (HCC)    completed chemo    Esophageal reflux    Essential hypertension    Exposure to medical diagnostic radiation    last tx 8/18/2022    Frequent urination    Gastroparesis    Heartburn    High blood pressure    High cholesterol    Found when I had quadruple bypass    History of COVID-19    asymptomatic - pt was dx during a hospitalization for another diagnosis. No continued symptoms    History of stomach ulcers    Hyperlipidemia    Hyperlipidemia LDL goal < 70    Indigestion    Morbid obesity with BMI of 40.0-44.9, adult (HCC)    Muscle weakness    Nausea vomiting and diarrhea    Nontoxic multinodular goiter    Dx in 8/2013: pt was told that imaging showed thyroid cysts per PCP    Pancreatic cancer (HCC)    last dose 12/4/2023 is scheduled for another round 12/27/23    Peripheral vascular disease (HCC)    pt denies    Personal history of antineoplastic chemotherapy    for esophageal cancer/completed    Personal history of antineoplastic chemotherapy    pancreatic cancer    Personal history of antineoplastic chemotherapy    Last treatment 3/12/24    Problems with swallowing    Pulmonary nodules    Dx in 8/2013: CT chest showed small bilateral fissural-based lung nodules less than 1 cm    S/P CABG x 4    On 8/16/13: CABG x 4 with LIMA to LAD and SVG to diagonal, OM and PDA    Shortness of breath    when coughing; no oxygen    Vomiting       Surgical History:  Past Surgical History:   Procedure Laterality Date    Appendectomy      Appendectomy      Arthroscopy of joint unlisted      right shoulder    Cabg      On 8/16/13: CABG x 4 with LIMA to LAD and SVG to diagonal, OM and PDA    Cardiac cath lab      On 8/14/2013: cardiac cath showed 3-vessel disease    Other surgical history      1.        Laparoscopic robotic-assisted esophagogastrectomy.    Port, indwelling, imp         Allergies:  Allergies   Allergen Reactions    Oxaliplatin HIVES     Shaking        Family History:  Family History   Problem Relation Age of Onset    Cancer Mother         breast and colon     Diabetes Neg        Social History:  Social History     Socioeconomic History    Marital status:      Spouse name: Not on file    Number of children: 3    Years of education: Not on file    Highest education level: Not on file   Occupational History    Occupation: works as  - on workman's comp   Tobacco Use    Smoking status: Former     Current packs/day: 0.00     Average packs/day: 1 pack/day for 27.0 years (27.0 ttl pk-yrs)     Types: Cigarettes     Start date: 8/15/1986     Quit date: 8/15/2013     Years since quitting: 10.8    Smokeless tobacco: Never    Tobacco comments:     Quit smoking 2013   Vaping Use    Vaping status: Never Used   Substance and Sexual Activity    Alcohol use: Never    Drug use: Never    Sexual activity: Not Currently     Partners: Female   Other Topics Concern     Service Not Asked    Blood Transfusions Not Asked    Caffeine Concern Yes    Occupational Exposure Not Asked    Hobby Hazards Not Asked    Sleep Concern Not Asked    Stress Concern No    Weight Concern No    Special Diet No    Back Care Not Asked    Exercise No    Bike Helmet Not Asked    Seat Belt No    Self-Exams Not Asked   Social History Narrative    Lives with life partner    Has 3 daughters - 1 lives closeby, 1 in [place], 1 in [place]     Social Determinants of Health     Financial Resource Strain: Low Risk  (5/31/2024)    Received [facility]    Overall Financial Resource Strain (CARDIA)     Difficulty of Paying Living Expenses: Not hard at all   Food Insecurity: No Food Insecurity (6/18/2024)    Food Insecurity     Food Insecurity: Never true   Transportation Needs: No Transportation Needs (6/18/2024)     Transportation Needs     Lack of Transportation: No     Car Seat: Not on file   Physical Activity: Not on file   Stress: Not on file   Social Connections: Not on file   Housing Stability: Low Risk  (6/18/2024)    Housing Stability     Housing Instability: No     Housing Instability Emergency: No     Crib or Bassinette: Not on file       Medications:    Current Outpatient Medications:     apixaban 5 MG Oral Tab, Take 1 tablet (5 mg total) by mouth 2 (two) times daily., Disp: 60 tablet, Rfl: 2    oxyCODONE 5 MG Oral Tab, Take 1 tablet (5 mg total) by mouth every 4 (four) hours as needed for Pain., Disp: 90 tablet, Rfl: 0    OLANZAPINE 5 MG Oral Tab, TAKE 1 TABLET BY MOUTH EVERY DAY AT NIGHT, Disp: 90 tablet, Rfl: 1    pantoprazole 40 MG Oral Tab EC, Take 1 tablet (40 mg total) by mouth every morning before breakfast., Disp: 30 tablet, Rfl: 0    prochlorperazine (COMPAZINE) 10 mg tablet, Take 1 tablet (10 mg total) by mouth every 6 (six) hours as needed for Nausea., Disp: 30 tablet, Rfl: 3    ondansetron 4 MG Oral Tablet Dispersible, Take 1 tablet (4 mg total) by mouth every 8 (eight) hours as needed for Nausea., Disp: 30 tablet, Rfl: 0    SERTRALINE 100 MG Oral Tab, TAKE 1.5 TABLETS (150MG TOTAL) BY MOUTH DAILY, Disp: 135 tablet, Rfl: 1    metoprolol tartrate 25 MG Oral Tab, Take 1 tablet (25 mg total) by mouth 2x Daily(Beta Blocker)., Disp: 60 tablet, Rfl: 1    losartan 50 MG Oral Tab, Take 1 tablet (50 mg total) by mouth daily., Disp: 90 tablet, Rfl: 2    dicyclomine 10 MG Oral Cap, Take 1 capsule (10 mg total) by mouth 4 (four) times daily before meals and nightly. (Patient not taking: Reported on 6/27/2024), Disp: 90 capsule, Rfl: 0    Review of Systems:  A comprehensive 14 point review of systems was completed.  Pertinent positives and negatives noted in the HPI.    Performance Status: ECOG 1-2    Physical Examination:  General: Patient is alert and oriented x 3, not in acute distress.  Vital Signs: Height: 187.5  cm (6' 1.82\") (06/27 0957)  Weight: 78.7 kg (173 lb 8 oz) (06/27 0957)  BSA (Calculated - sq m): 2.04 sq meters (06/27 0957)  Pulse: 58 (06/27 0957)  BP: 177/80 (06/27 0957)  Temp: 97 °F (36.1 °C) (06/27 0957)  Do Not Use - Resp Rate: --  SpO2: 98 % (06/27 0957)  HEENT: Anicteric, conjunctivae and sclerae clear, no oropharyngeal lesion/thrush, mucous membranes are moist   Chest: Clear to auscultation. Respirations unlabored.   Heart: Regular rate and rhythm.   Abdomen: Soft, non-distended, non-tender with present bowel sounds.  Extremities: RLE edema extending from foot to mid-calf  Neurological: Grossly intact.   Lymphatics: There is no palpable lymphadenopathy throughout in the cervical or supraclavicular regions.   Skin: warm, dry, no erythema or rash   Psych/Depression: mood and affect are appropriate.     Labs:     Recent Results (from the past 72 hour(s))   Comp Metabolic Panel (14)    Collection Time: 06/27/24  9:29 AM   Result Value Ref Range    Glucose 74 70 - 99 mg/dL    Sodium 140 136 - 145 mmol/L    Potassium 3.5 3.5 - 5.1 mmol/L    Chloride 110 98 - 112 mmol/L    CO2 28.0 21.0 - 32.0 mmol/L    Anion Gap 2 0 - 18 mmol/L    BUN 10 9 - 23 mg/dL    Creatinine 0.60 (L) 0.70 - 1.30 mg/dL    Calcium, Total 8.2 (L) 8.5 - 10.1 mg/dL    Calculated Osmolality 288 275 - 295 mOsm/kg    eGFR-Cr 115 >=60 mL/min/1.73m2    AST 27 15 - 37 U/L    ALT 30 16 - 61 U/L    Alkaline Phosphatase 117 45 - 117 U/L    Bilirubin, Total 0.2 0.1 - 2.0 mg/dL    Total Protein 6.0 (L) 6.4 - 8.2 g/dL    Albumin 2.6 (L) 3.4 - 5.0 g/dL    Globulin  3.4 2.8 - 4.4 g/dL    A/G Ratio 0.8 (L) 1.0 - 2.0    Patient Fasting for CMP? Patient not present    CBC W/ DIFFERENTIAL    Collection Time: 06/27/24  9:29 AM   Result Value Ref Range    WBC 5.7 4.0 - 11.0 x10(3) uL    RBC 3.66 (L) 4.30 - 5.70 x10(6)uL    HGB 10.8 (L) 13.0 - 17.5 g/dL    HCT 34.5 (L) 39.0 - 53.0 %    .0 150.0 - 450.0 10(3)uL    MCV 94.3 80.0 - 100.0 fL    MCH 29.5 26.0 -  34.0 pg    MCHC 31.3 31.0 - 37.0 g/dL    RDW 14.0 %    Neutrophil Absolute Prelim 4.05 1.50 - 7.70 x10 (3) uL    Neutrophil Absolute 4.05 1.50 - 7.70 x10(3) uL    Lymphocyte Absolute 0.77 (L) 1.00 - 4.00 x10(3) uL    Monocyte Absolute 0.63 0.10 - 1.00 x10(3) uL    Eosinophil Absolute 0.15 0.00 - 0.70 x10(3) uL    Basophil Absolute 0.02 0.00 - 0.20 x10(3) uL    Immature Granulocyte Absolute 0.04 0.00 - 1.00 x10(3) uL    Neutrophil % 71.5 %    Lymphocyte % 13.6 %    Monocyte % 11.1 %    Eosinophil % 2.7 %    Basophil % 0.4 %    Immature Granulocyte % 0.7 %       Impression/Plan    Metastatic esophageal cancer: restarted FOLFOX and trastuzumab on 2/5/2024 but then had several interruptions for surgery and hospitalizations. Last PET/CT on 5/15/2024 shows overall stable disease. Restarted chemo-immuno on 6/3/2024. Labs reviewed, proceed with treatment today without further dose modifications. Repeat imaging in July.     PE/RLE DVT: diagnosed in 6/2024. On apixaban, refill sent. Reviewed supportive care measures including compression stockings and provided printed material    Neoplasm related pain: follows with Palliative Care, [place] refill sent    Planned Follow Up: 2 weeks follow up with Dr. Foster, labs and chemo    Risk Level: HIGH metastatic esophageal cancer receiving chemotherapy requiring close monitoring     The 21st Century Cures Act makes medical notes like these available to patients in the interest of transparency. Please be advised this is a medical document. Medical documents are intended to carry relevant information, facts as evident, and the clinical opinion of the practitioner. The medical note is intended as peer to peer communication and may appear blunt or direct. It is written in medical language and may contain abbreviations or verbiage that are unfamiliar.     Electronically Signed by:    ARTEMIO Brown, NP-C  Nurse Practitioner  [facility]

## 2024-06-27 NOTE — TELEPHONE ENCOUNTER
From: Dontae Cortez Jr.  To: NATHANIEL BOWDEN  Sent: 6/27/2024 2:09 PM CDT  Subject: Dontae Cortez     I do not have any more hydrocodone

## 2024-06-27 NOTE — PROGRESS NOTES
Patient here for C20D1 FOLFOX/Trazimera. Patient denies n/v/d. Patient states he has pain right lateral abdominal area that he rates a 9/10. Patient takes oxycodone for pain relief. Patient states he has pain on right foot. Patient recently diagnosed with DVT. Patient on Eliquis 5mg bid. Patient states he has good appetite and adequate fluid intake.     Education Record    Learner:  Patient    Disease / Diagnosis: esophageal carcinoma     Barriers / Limitations:  None   Comments:    Method:  Discussion   Comments:    General Topics:  Medication, Side effects and symptom management, and Plan of care reviewed   Comments:    Outcome:  Shows understanding   Comments:

## 2024-06-27 NOTE — TELEPHONE ENCOUNTER
Spoke to patient regarding his pain plan.  Currently, he has been using norco during the day about every 4-5 hrs and using oxycodone 1-2X at night.  He's not sure which one if more effective.    To minimize potential side effects, will continue with one short acting drug.  He received oxycodone while in the hospital with benefit so will continue this    He just filled an oxycodone script a couple of days ago.  Instructed him to stop norco and just use oxycodone.  This may help optimize pain control.  He can add acetaminophen if needed.

## 2024-07-01 ENCOUNTER — DOCUMENTATION ONLY (OUTPATIENT)
Dept: HEMATOLOGY/ONCOLOGY | Facility: HOSPITAL | Age: 54
End: 2024-07-01

## 2024-07-01 RX ORDER — PANTOPRAZOLE SODIUM 40 MG/1
40 TABLET, DELAYED RELEASE ORAL
Qty: 30 TABLET | Refills: 0 | Status: SHIPPED | OUTPATIENT
Start: 2024-07-01 | End: 2024-09-09

## 2024-07-05 ENCOUNTER — PATIENT MESSAGE (OUTPATIENT)
Dept: HEMATOLOGY/ONCOLOGY | Age: 54
End: 2024-07-05

## 2024-07-05 DIAGNOSIS — G89.3 NEOPLASM RELATED PAIN: ICD-10-CM

## 2024-07-05 RX ORDER — OXYCODONE HYDROCHLORIDE 5 MG/1
5 TABLET ORAL EVERY 6 HOURS PRN
Qty: 60 TABLET | Refills: 0 | Status: SHIPPED | OUTPATIENT
Start: 2024-07-08

## 2024-07-05 NOTE — TELEPHONE ENCOUNTER
From: Dontae Cortez Jr.  To: NATHANIEL BOWDEN  Sent: 7/5/2024 4:28 PM CDT  Subject: Dontae Cortez     Are you still in the office today

## 2024-07-05 NOTE — TELEPHONE ENCOUNTER
Spoke with patient regarding his pain.  He is asking for a refill of the oxycodone.    He filled it on June 25th and was using 6-7 doses daily with benefit.  The dose varied between 5 and 10mg.    He feels his pain has improved and he is now using 2-4 tabs daily depending on pain level and activity.  Increased activity does intensify pain.  He is using 5mg during the day and 10mg at night with benefit.    He supplements with tylenol at times which is helpful.  Pain doesn't wake him and he is feeling much better now.    He will continue to minimize use as pain will allow.  He can continue using 10mg at night, if needed  He is tolerating oxycodone well and denies any SE.    The last couple of days, he used only 2 doses.

## 2024-07-08 ENCOUNTER — TELEPHONE (OUTPATIENT)
Dept: HEMATOLOGY/ONCOLOGY | Facility: HOSPITAL | Age: 54
End: 2024-07-08

## 2024-07-08 NOTE — TELEPHONE ENCOUNTER
LVM for patient requesting call back to schedule ACV for leg swelling and redness. [employer] message sent as well.

## 2024-07-09 ENCOUNTER — OFFICE VISIT (OUTPATIENT)
Dept: HEMATOLOGY/ONCOLOGY | Age: 54
End: 2024-07-09
Attending: INTERNAL MEDICINE
Payer: COMMERCIAL

## 2024-07-09 ENCOUNTER — SOCIAL WORK SERVICES (OUTPATIENT)
Dept: HEMATOLOGY/ONCOLOGY | Facility: HOSPITAL | Age: 54
End: 2024-07-09

## 2024-07-09 VITALS
RESPIRATION RATE: 18 BRPM | OXYGEN SATURATION: 98 % | WEIGHT: 162 LBS | HEIGHT: 73.82 IN | BODY MASS INDEX: 20.79 KG/M2 | TEMPERATURE: 97 F | SYSTOLIC BLOOD PRESSURE: 168 MMHG | DIASTOLIC BLOOD PRESSURE: 85 MMHG | HEART RATE: 56 BPM

## 2024-07-09 DIAGNOSIS — R22.42 LOCALIZED SWELLING OF LEFT LOWER EXTREMITY: ICD-10-CM

## 2024-07-09 DIAGNOSIS — I82.431 ACUTE DEEP VEIN THROMBOSIS (DVT) OF POPLITEAL VEIN OF RIGHT LOWER EXTREMITY (HCC): ICD-10-CM

## 2024-07-09 DIAGNOSIS — C15.5 MALIGNANT NEOPLASM OF LOWER THIRD OF ESOPHAGUS (HCC): Primary | ICD-10-CM

## 2024-07-09 DIAGNOSIS — C78.89 METASTATIC ADENOCARCINOMA TO PANCREAS (HCC): ICD-10-CM

## 2024-07-09 PROCEDURE — 99213 OFFICE O/P EST LOW 20 MIN: CPT | Performed by: NURSE PRACTITIONER

## 2024-07-09 RX ORDER — FUROSEMIDE 20 MG/1
20 TABLET ORAL DAILY
Qty: 15 TABLET | Refills: 0 | Status: SHIPPED | OUTPATIENT
Start: 2024-07-09 | End: 2024-07-09 | Stop reason: ALTCHOICE

## 2024-07-09 RX ORDER — BACLOFEN 10 MG/1
10 TABLET ORAL 3 TIMES DAILY
Qty: 30 TABLET | Refills: 0 | Status: SHIPPED | OUTPATIENT
Start: 2024-07-09

## 2024-07-09 NOTE — PROGRESS NOTES
SW received a request to assist with a letter to [employer] to have approval for PT visits.  Letter completed and sent to pt via Zoom message.    SW spoke to pt to confirm needs.  Support given.

## 2024-07-09 NOTE — PROGRESS NOTES
Cancer Center Progress Note    Patient Name: Dontae Cortez Jr.   YOB: 1970   Medical Record Number: [id number]   CSN: [id number]   Date of visit: 7/9/2024       Chief Complaint/Reason for Visit:  Chief Complaint   Patient presents with    Other     Acute care visit        History of Present Illness:   Dontae Cortez Jr. Is a 53 year old patient of Dr. Crespo with metastatic esophageal cancer, he is receiving chemotherapy and immunotherapy with FOLFOX, trastuzumab and nivolumab.  His last treatment was 6/27/24. He presents today for ongoing bilateral lower extremity swelling. He reports swelling worsens as the day goes on, yesterday he noted some mild redness to the right lower extremity. He denies any injury. He has been taking his eliquis as prescribed, no missed doses. He has new tingling sensation to his right foot.  He tried compression socks and elevation without complete resolution of edema.   Denies any fevers or signs of infection. No chest pain or shortness of breath.  Muscle relaxer helps with occasional leg pain, asking for a refill.   Denies any n/v/d    Problem List:  Patient Active Problem List   Diagnosis    Primary hypertension    Hyperlipidemia with target low density lipoprotein (LDL) cholesterol less than 70 mg/dL    Coronary artery disease involving coronary bypass graft of native heart with angina pectoris (HCC)    S/P CABG x 4    Nontoxic multinodular goiter    Pulmonary nodules    Thrombophlebitis leg    BRANDAN (generalized anxiety disorder)    Right shoulder Arthroscopy acromioplasty ,distal  clavicle resection, rotator cuff/labral debridment  Global 05/13/2021    Right bicipital tenosynovitis    Malignant neoplasm of esophagus (HCC)    Pleural effusion    Esophageal anastomotic leak    Esophageal obstruction    On total parenteral nutrition (TPN)    Mechanical complication of esophagostomy (HCC)    Abdominal pain of unknown etiology    Migration of esophageal stent     Gastroparesis    Esophageal carcinoma (HCC)    Normocytic anemia    Esophageal fistula    Subacute cough    Acquired bronchoesophageal fistula (HCC)    Pneumonia of both lower lobes due to infectious organism    Malignant neoplasm of pancreas (HCC)    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 difficile colitis    Dysphagia, unspecified type    Dyspnea and respiratory abnormalities    Hypokalemia    Dysphagia    Narcotic drug use    History of esophageal cancer    Palliative care by specialist    Neoplasm related pain    Endobronchial mass    Hyponatremia    Thrombocytopenia (HCC)    Acute kidney injury (HCC)    Hyperglycemia    Aspiration pneumonitis (HCC)    C. difficile diarrhea    Aspiration pneumonia (HCC)    Malignant neoplasm metastatic to liver (HCC)    Hyperlipidemia    Nausea vomiting and diarrhea    Fistula, bronchoesophageal (HCC)    Iron deficiency anemia, unspecified iron deficiency anemia type    Azotemia    Palliative care encounter    Goals of care, counseling/discussion    Cancer related pain    Anemia    Fistula    Acute cough    Pneumonia of right lung due to infectious organism, unspecified part of lung    Bacteremia    Acute postoperative pain    Exocrine pancreatic insufficiency (HCC)    Hypertriglyceridemia    Acute pulmonary embolism without acute cor pulmonale, unspecified pulmonary embolism type (HCC)    Acute deep vein thrombosis (DVT) of popliteal vein of right lower extremity (HCC)    Altered mental status, unspecified altered mental status type        Medical History:  Past Medical History:    Back problem    Belching    Black stools    Borderline diabetes    Dx in 8/2013 - HgA1C  6.2%    C. difficile diarrhea    pt treated and without symptoms    Chest pain    Coronary artery disease    On 8/16/13: CABG x 4 with LIMA to LAD and SVG to diagonal, OM and PDA    Decorative tattoo    Depression    Difficult intubation    h/o esophagectomy for CA; developed esophagobronchial vistula    Disorder of liver    LIVER CA    Esophageal cancer (HCC)    completed chemo    Esophageal reflux    Essential hypertension    Exposure to medical diagnostic radiation    last tx 8/18/2022    Frequent urination    Gastroparesis    Heartburn    High blood pressure    High cholesterol    Found when I had quadruple bypass    History of COVID-19    asymptomatic - pt was dx during a hospitalization for another diagnosis. No continued symptoms    History of stomach ulcers    Hyperlipidemia    Hyperlipidemia LDL goal < 70    Indigestion    Morbid obesity with BMI of 40.0-44.9, adult (HCC)    Muscle weakness    Nausea vomiting and diarrhea    Nontoxic multinodular goiter    Dx in 8/2013: pt was told that imaging showed thyroid cysts per PCP    Pancreatic cancer (HCC)    last dose 12/4/2023 is scheduled for another round 12/27/23    Peripheral vascular disease (HCC)    pt denies    Personal history of antineoplastic chemotherapy    for esophageal cancer/completed    Personal history of antineoplastic chemotherapy    pancreatic cancer    Personal history of antineoplastic chemotherapy    Last treatment 3/12/24    Problems with swallowing    Pulmonary nodules    Dx in 8/2013: CT chest showed small bilateral fissural-based lung nodules less than 1 cm    S/P CABG x 4    On 8/16/13: CABG x 4 with LIMA to LAD and SVG to diagonal, OM and PDA    Shortness of breath    when coughing; no oxygen    Vomiting       Surgical History:  Past Surgical History:   Procedure Laterality Date    Appendectomy      Appendectomy      Arthroscopy of joint unlisted      right shoulder    Cabg      On 8/16/13: CABG x 4 with LIMA to LAD and SVG to  diagonal, OM and PDA    Cardiac cath lab      On 8/14/2013: cardiac cath showed 3-vessel disease    Other surgical history      1.       Laparoscopic robotic-assisted esophagogastrectomy.    Port, indwelling, imp         Allergies:  Allergies   Allergen Reactions    Oxaliplatin HIVES     Shaking        Family History:  Family History   Problem Relation Age of Onset    Cancer Mother         breast and colon     Diabetes Neg        Social History:  Social History     Socioeconomic History    Marital status:      Spouse name: Not on file    Number of children: 3    Years of education: Not on file    Highest education level: Not on file   Occupational History    Occupation: works as  - on workman's comp   Tobacco Use    Smoking status: Former     Current packs/day: 0.00     Average packs/day: 1 pack/day for 27.0 years (27.0 ttl pk-yrs)     Types: Cigarettes     Start date: 8/15/1986     Quit date: 8/15/2013     Years since quitting: 10.9    Smokeless tobacco: Never    Tobacco comments:     Quit smoking 2013   Vaping Use    Vaping status: Never Used   Substance and Sexual Activity    Alcohol use: Never    Drug use: Never    Sexual activity: Not Currently     Partners: Female   Other Topics Concern     Service Not Asked    Blood Transfusions Not Asked    Caffeine Concern Yes    Occupational Exposure Not Asked    Hobby Hazards Not Asked    Sleep Concern Not Asked    Stress Concern No    Weight Concern No    Special Diet No    Back Care Not Asked    Exercise No    Bike Helmet Not Asked    Seat Belt No    Self-Exams Not Asked   Social History Narrative    Lives with life partner    Has 3 daughters - 1 lives closeby, 1 in [place], 1 in [place]     Social Determinants of Health     Financial Resource Strain: Low Risk  (5/31/2024)    [facility]    Overall Financial Resource Strain (CARDIA)     Difficulty of Paying Living Expenses: Not hard at all   Food Insecurity: No Food  Insecurity (6/18/2024)    Food Insecurity     Food Insecurity: Never true   Transportation Needs: No Transportation Needs (6/18/2024)    Transportation Needs     Lack of Transportation: No     Car Seat: Not on file   Physical Activity: Not on file   Stress: Not on file   Social Connections: Not on file   Housing Stability: Low Risk  (6/18/2024)    Housing Stability     Housing Instability: No     Housing Instability Emergency: No     Crib or Bassinette: Not on file       Medications:    Current Outpatient Medications:     baclofen 10 MG Oral Tab, Take 1 tablet (10 mg total) by mouth 3 (three) times daily., Disp: 30 tablet, Rfl: 0    oxyCODONE 5 MG Oral Tab, Take 1 tablet (5 mg total) by mouth every 6 (six) hours as needed for Pain., Disp: 60 tablet, Rfl: 0    PANTOPRAZOLE 40 MG Oral Tab EC, TAKE 1 TABLET BY MOUTH EVERY DAY IN THE MORNING BEFORE BREAKFAST, Disp: 30 tablet, Rfl: 0    apixaban 5 MG Oral Tab, Take 1 tablet (5 mg total) by mouth 2 (two) times daily., Disp: 60 tablet, Rfl: 2    OLANZAPINE 5 MG Oral Tab, TAKE 1 TABLET BY MOUTH EVERY DAY AT NIGHT, Disp: 90 tablet, Rfl: 1    SERTRALINE 100 MG Oral Tab, TAKE 1.5 TABLETS (150MG TOTAL) BY MOUTH DAILY, Disp: 135 tablet, Rfl: 1    metoprolol tartrate 25 MG Oral Tab, Take 1 tablet (25 mg total) by mouth 2x Daily(Beta Blocker)., Disp: 60 tablet, Rfl: 1    losartan 50 MG Oral Tab, Take 1 tablet (50 mg total) by mouth daily., Disp: 90 tablet, Rfl: 2    Review of Systems:  A comprehensive 14 point review of systems was completed.  Pertinent positives and negatives noted in the HPI.    Performance Status: ECOG 1 - No physically strenuous activity, but ambulatory and able to carry out light or sedentary work (e.g. office work, light house work).    Physical Examination:  Vital Signs: Height: 187.5 cm (6' 1.82\") (07/09 1039)  Weight: 73.5 kg (162 lb) (07/09 1039)  BSA (Calculated - sq m): 1.98 sq meters (07/09 1039)  Pulse: 56 (07/09 1039)  BP: 168/85 (07/09 1039)  Temp:  97 °F (36.1 °C) (07/09 1039)  Do Not Use - Resp Rate: --  SpO2: 98 % (07/09 1039)    General: Patient is alert and oriented x 3, not in acute distress.  HEENT: Anicteric, conjunctivae and sclerae clear, no oropharyngeal lesion/thrush, mucous membranes are moist   Chest: Clear to auscultation. Respirations unlabored.   Heart: Regular rate and rhythm.   Abdomen: Soft, non-distended, non-tender with present bowel sounds.  Extremities: bilateral edema, right greater then the left. No redness. No signs of infection  Neurological: Grossly intact.   Skin: warm, dry, no erythema or rash   Psych/Depression: mood and affect are appropriate.     Labs:   No results found for this or any previous visit (from the past 72 hour(s)).    Impression/Plan    Metastatic esophageal cancer:  Restarted FOLFOX and trastuzumab 2/5/24 with several interruptions. Most recently restarted tx 6/3/2024.   Plan to repeat imaging in July.   Continue current treatment plan    Bilateral lower extremity edema, right greater then the left.   Continue supportive measures with compression socks and elevation. No signs of infection. Afebrile. No redness or injury. Not warm.     Continue eliquis BID for PE/DVT. Could be contributing to swelling.   No chest pain or shortness of breath. No pain to lower extremities.       Planned Follow Up: as scheduled, sooner if needed    Risk Level: HIGH, esophageal cancer, receiving chemotherapy requiring close monitoring     The 21st Century Cures Act makes medical notes like these available to patients in the interest of transparency. Please be advised this is a medical document. Medical documents are intended to carry relevant information, facts as evident, and the clinical opinion of the practitioner. The medical note is intended as peer to peer communication and may appear blunt or direct. It is written in medical language and may contain abbreviations or verbiage that are unfamiliar.     Electronically Signed  by:    Cristina ULLOA, FNP-BC  Nurse Practitioner  [facility]

## 2024-07-09 NOTE — PROGRESS NOTES
Pt here for ACV   Pt reports BLE swelling  Redness has improved  Taking Eliquis as prescribed  Some pain  Education Record     Learner:  Patient     Disease / Diagnosis:      Barriers / Limitations:  none                Comments:     Method:  Discussion                Comments:     General Topics:  Plan of care reviewed                Comments:     Outcome:  Shows understanding                Comments:

## 2024-07-15 ENCOUNTER — PATIENT MESSAGE (OUTPATIENT)
Dept: HEMATOLOGY/ONCOLOGY | Age: 54
End: 2024-07-15

## 2024-07-15 ENCOUNTER — OFFICE VISIT (OUTPATIENT)
Dept: HEMATOLOGY/ONCOLOGY | Age: 54
End: 2024-07-15
Attending: INTERNAL MEDICINE
Payer: COMMERCIAL

## 2024-07-15 ENCOUNTER — NURSE NAVIGATOR ENCOUNTER (OUTPATIENT)
Dept: HEMATOLOGY/ONCOLOGY | Facility: HOSPITAL | Age: 54
End: 2024-07-15

## 2024-07-15 VITALS
OXYGEN SATURATION: 99 % | RESPIRATION RATE: 18 BRPM | SYSTOLIC BLOOD PRESSURE: 190 MMHG | HEIGHT: 73.82 IN | BODY MASS INDEX: 21.05 KG/M2 | HEART RATE: 60 BPM | WEIGHT: 164 LBS | DIASTOLIC BLOOD PRESSURE: 80 MMHG | TEMPERATURE: 97 F

## 2024-07-15 DIAGNOSIS — R22.42 LOCALIZED SWELLING OF LEFT LOWER EXTREMITY: ICD-10-CM

## 2024-07-15 DIAGNOSIS — I26.99 ACUTE PULMONARY EMBOLISM WITHOUT ACUTE COR PULMONALE, UNSPECIFIED PULMONARY EMBOLISM TYPE (HCC): ICD-10-CM

## 2024-07-15 DIAGNOSIS — Z51.11 ENCOUNTER FOR CHEMOTHERAPY MANAGEMENT: ICD-10-CM

## 2024-07-15 DIAGNOSIS — T80.90XA INFUSION REACTION, INITIAL ENCOUNTER: ICD-10-CM

## 2024-07-15 DIAGNOSIS — C78.89 METASTATIC ADENOCARCINOMA TO PANCREAS (HCC): ICD-10-CM

## 2024-07-15 DIAGNOSIS — C15.5 MALIGNANT NEOPLASM OF LOWER THIRD OF ESOPHAGUS (HCC): Primary | ICD-10-CM

## 2024-07-15 DIAGNOSIS — Z51.12 ENCOUNTER FOR ANTINEOPLASTIC IMMUNOTHERAPY: ICD-10-CM

## 2024-07-15 LAB
ALBUMIN SERPL-MCNC: 2.9 G/DL (ref 3.4–5)
ALBUMIN/GLOB SERPL: 0.8 {RATIO} (ref 1–2)
ALP LIVER SERPL-CCNC: 116 U/L
ALT SERPL-CCNC: 32 U/L
ANION GAP SERPL CALC-SCNC: 5 MMOL/L (ref 0–18)
AST SERPL-CCNC: 27 U/L (ref 15–37)
BASOPHILS # BLD AUTO: 0.03 X10(3) UL (ref 0–0.2)
BASOPHILS NFR BLD AUTO: 0.8 %
BILIRUB SERPL-MCNC: 0.3 MG/DL (ref 0.1–2)
BUN BLD-MCNC: 5 MG/DL (ref 9–23)
CALCIUM BLD-MCNC: 8.6 MG/DL (ref 8.5–10.1)
CANCER AG19-9 SERPL-ACNC: 3393.2 U/ML (ref ?–37)
CHLORIDE SERPL-SCNC: 108 MMOL/L (ref 98–112)
CO2 SERPL-SCNC: 27 MMOL/L (ref 21–32)
CREAT BLD-MCNC: 0.68 MG/DL
EGFRCR SERPLBLD CKD-EPI 2021: 110 ML/MIN/1.73M2 (ref 60–?)
EOSINOPHIL # BLD AUTO: 0.1 X10(3) UL (ref 0–0.7)
EOSINOPHIL NFR BLD AUTO: 2.5 %
ERYTHROCYTE [DISTWIDTH] IN BLOOD BY AUTOMATED COUNT: 15 %
GLOBULIN PLAS-MCNC: 3.5 G/DL (ref 2.8–4.4)
GLUCOSE BLD-MCNC: 152 MG/DL (ref 70–99)
HCT VFR BLD AUTO: 37.4 %
HGB BLD-MCNC: 12 G/DL
IMM GRANULOCYTES # BLD AUTO: 0.03 X10(3) UL (ref 0–1)
IMM GRANULOCYTES NFR BLD: 0.8 %
LYMPHOCYTES # BLD AUTO: 0.79 X10(3) UL (ref 1–4)
LYMPHOCYTES NFR BLD AUTO: 19.9 %
MCH RBC QN AUTO: 29.8 PG (ref 26–34)
MCHC RBC AUTO-ENTMCNC: 32.1 G/DL (ref 31–37)
MCV RBC AUTO: 92.8 FL
MONOCYTES # BLD AUTO: 0.67 X10(3) UL (ref 0.1–1)
MONOCYTES NFR BLD AUTO: 16.9 %
NEUTROPHILS # BLD AUTO: 2.34 X10 (3) UL (ref 1.5–7.7)
NEUTROPHILS # BLD AUTO: 2.34 X10(3) UL (ref 1.5–7.7)
NEUTROPHILS NFR BLD AUTO: 59.1 %
OSMOLALITY SERPL CALC.SUM OF ELEC: 290 MOSM/KG (ref 275–295)
PLATELET # BLD AUTO: 186 10(3)UL (ref 150–450)
POTASSIUM SERPL-SCNC: 3.3 MMOL/L (ref 3.5–5.1)
PROT SERPL-MCNC: 6.4 G/DL (ref 6.4–8.2)
RBC # BLD AUTO: 4.03 X10(6)UL
SODIUM SERPL-SCNC: 140 MMOL/L (ref 136–145)
T4 FREE SERPL-MCNC: 0.9 NG/DL (ref 0.8–1.7)
TSI SER-ACNC: 0.56 MIU/ML (ref 0.36–3.74)
WBC # BLD AUTO: 4 X10(3) UL (ref 4–11)

## 2024-07-15 PROCEDURE — S0028 INJECTION, FAMOTIDINE, 20 MG: HCPCS | Performed by: NURSE PRACTITIONER

## 2024-07-15 PROCEDURE — 96376 TX/PRO/DX INJ SAME DRUG ADON: CPT

## 2024-07-15 PROCEDURE — 80053 COMPREHEN METABOLIC PANEL: CPT

## 2024-07-15 PROCEDURE — 96375 TX/PRO/DX INJ NEW DRUG ADDON: CPT

## 2024-07-15 PROCEDURE — 84439 ASSAY OF FREE THYROXINE: CPT

## 2024-07-15 PROCEDURE — 86301 IMMUNOASSAY TUMOR CA 19-9: CPT

## 2024-07-15 PROCEDURE — 96368 THER/DIAG CONCURRENT INF: CPT

## 2024-07-15 PROCEDURE — 84443 ASSAY THYROID STIM HORMONE: CPT

## 2024-07-15 PROCEDURE — 85025 COMPLETE CBC W/AUTO DIFF WBC: CPT

## 2024-07-15 PROCEDURE — 96417 CHEMO IV INFUS EACH ADDL SEQ: CPT

## 2024-07-15 PROCEDURE — 96413 CHEMO IV INFUSION 1 HR: CPT

## 2024-07-15 RX ORDER — DIPHENHYDRAMINE HYDROCHLORIDE 50 MG/ML
25 INJECTION INTRAMUSCULAR; INTRAVENOUS ONCE
Status: COMPLETED | OUTPATIENT
Start: 2024-07-15 | End: 2024-07-15

## 2024-07-15 RX ORDER — POTASSIUM CHLORIDE 750 MG/1
20 TABLET, EXTENDED RELEASE ORAL DAILY
Status: DISCONTINUED | OUTPATIENT
Start: 2024-07-15 | End: 2024-07-15

## 2024-07-15 RX ORDER — METHYLPREDNISOLONE SODIUM SUCCINATE 125 MG/2ML
125 INJECTION, POWDER, LYOPHILIZED, FOR SOLUTION INTRAMUSCULAR; INTRAVENOUS ONCE
Status: COMPLETED | OUTPATIENT
Start: 2024-07-15 | End: 2024-07-15

## 2024-07-15 RX ORDER — FUROSEMIDE 20 MG/1
20 TABLET ORAL DAILY
Qty: 30 TABLET | Refills: 1 | Status: SHIPPED | OUTPATIENT
Start: 2024-07-15

## 2024-07-15 RX ORDER — METHYLPREDNISOLONE SODIUM SUCCINATE 125 MG/2ML
125 INJECTION, POWDER, LYOPHILIZED, FOR SOLUTION INTRAMUSCULAR; INTRAVENOUS ONCE
Status: CANCELLED | OUTPATIENT
Start: 2024-07-15 | End: 2024-07-15

## 2024-07-15 RX ORDER — POTASSIUM CHLORIDE 750 MG/1
20 TABLET, EXTENDED RELEASE ORAL DAILY
Status: CANCELLED
Start: 2024-07-15

## 2024-07-15 RX ORDER — FUROSEMIDE 20 MG/1
20 TABLET ORAL DAILY
COMMUNITY
Start: 2024-07-09 | End: 2024-07-15

## 2024-07-15 RX ORDER — MONTELUKAST SODIUM 10 MG/1
TABLET ORAL
Qty: 4 TABLET | Refills: 0 | Status: SHIPPED | OUTPATIENT
Start: 2024-07-15

## 2024-07-15 RX ORDER — DIPHENHYDRAMINE HYDROCHLORIDE 50 MG/ML
25 INJECTION INTRAMUSCULAR; INTRAVENOUS ONCE
Status: CANCELLED | OUTPATIENT
Start: 2024-07-15 | End: 2024-07-15

## 2024-07-15 RX ORDER — POTASSIUM CHLORIDE 1500 MG/1
1 TABLET, EXTENDED RELEASE ORAL DAILY
Qty: 30 TABLET | Refills: 1 | Status: SHIPPED | OUTPATIENT
Start: 2024-07-15

## 2024-07-15 RX ORDER — FLUOROURACIL 50 MG/ML
2400 INJECTION, SOLUTION INTRAVENOUS CONTINUOUS
Status: CANCELLED | OUTPATIENT
Start: 2024-07-15

## 2024-07-15 RX ORDER — ATROPINE SULFATE 0.4 MG/ML
0.2 INJECTION, SOLUTION INTRAMUSCULAR; INTRAVENOUS; SUBCUTANEOUS ONCE
Status: COMPLETED | OUTPATIENT
Start: 2024-07-15 | End: 2024-07-15

## 2024-07-15 RX ORDER — FLUOROURACIL 50 MG/ML
2400 INJECTION, SOLUTION INTRAVENOUS CONTINUOUS
Status: DISCONTINUED | OUTPATIENT
Start: 2024-07-15 | End: 2024-07-15

## 2024-07-15 RX ORDER — FAMOTIDINE 10 MG/ML
20 INJECTION, SOLUTION INTRAVENOUS ONCE
Status: CANCELLED
Start: 2024-07-15 | End: 2024-07-15

## 2024-07-15 RX ORDER — FAMOTIDINE 10 MG/ML
20 INJECTION, SOLUTION INTRAVENOUS ONCE
Status: CANCELLED | OUTPATIENT
Start: 2024-07-15 | End: 2024-07-15

## 2024-07-15 RX ORDER — ATROPINE SULFATE 0.4 MG/ML
0.2 INJECTION, SOLUTION INTRAMUSCULAR; INTRAVENOUS; SUBCUTANEOUS ONCE
Status: CANCELLED
Start: 2024-07-15

## 2024-07-15 RX ORDER — FAMOTIDINE 10 MG/ML
20 INJECTION, SOLUTION INTRAVENOUS ONCE
Status: COMPLETED | OUTPATIENT
Start: 2024-07-15 | End: 2024-07-15

## 2024-07-15 RX ORDER — DEXAMETHASONE 4 MG/1
TABLET ORAL
Qty: 20 TABLET | Refills: 1 | Status: SHIPPED | OUTPATIENT
Start: 2024-07-15

## 2024-07-15 RX ADMIN — METHYLPREDNISOLONE SODIUM SUCCINATE 125 MG: 125 INJECTION, POWDER, LYOPHILIZED, FOR SOLUTION INTRAMUSCULAR; INTRAVENOUS at 09:31:00

## 2024-07-15 RX ADMIN — DIPHENHYDRAMINE HYDROCHLORIDE 25 MG: 50 INJECTION INTRAMUSCULAR; INTRAVENOUS at 12:30:00

## 2024-07-15 RX ADMIN — FAMOTIDINE 20 MG: 10 INJECTION, SOLUTION INTRAVENOUS at 12:53:00

## 2024-07-15 RX ADMIN — DIPHENHYDRAMINE HYDROCHLORIDE 25 MG: 50 INJECTION INTRAMUSCULAR; INTRAVENOUS at 12:20:00

## 2024-07-15 RX ADMIN — FAMOTIDINE 20 MG: 10 INJECTION, SOLUTION INTRAVENOUS at 09:25:00

## 2024-07-15 RX ADMIN — FLUOROURACIL 4800 MG: 50 INJECTION, SOLUTION INTRAVENOUS at 12:57:00

## 2024-07-15 RX ADMIN — ATROPINE SULFATE 0.2 MG: 0.4 INJECTION, SOLUTION INTRAMUSCULAR; INTRAVENOUS; SUBCUTANEOUS at 12:55:00

## 2024-07-15 RX ADMIN — POTASSIUM CHLORIDE 20 MEQ: 750 TABLET, EXTENDED RELEASE ORAL at 09:22:00

## 2024-07-15 RX ADMIN — DIPHENHYDRAMINE HYDROCHLORIDE 25 MG: 50 INJECTION INTRAMUSCULAR; INTRAVENOUS at 09:27:00

## 2024-07-15 NOTE — PROGRESS NOTES
Pt to take home premeds prior to next treatment. Pt verbalized understanding of apn's orders for home. Rash/hives look improved. Pt to go to ER if symptoms worsen. Pt verbalized understanding.

## 2024-07-15 NOTE — PROGRESS NOTES
Oncology Nutrition F/U Consultation     Patient Name: Dontae Cortez Jr.  YOB: 1970  Medical Record Number: [id number]            Account Number: [id number]  Dietitian: Kayla Hagen RD, LDN     Date of visit: 7/15/2024     Diet Rx: high protein/calorie as tolerated     Pertinent Dx/PMH: Esophageal cancer; pancreatic (head) cancer       Past Medical History         Past Medical History:    Back problem    Belching    Black stools    Borderline diabetes     Dx in 8/2013 - HgA1C 6.2%    C. difficile diarrhea     pt treated and without symptoms    Chest pain    Coronary artery disease     On 8/16/13: CABG x 4 with LIMA to LAD and SVG to diagonal, OM and PDA    Decorative tattoo    Depression    Difficult intubation     h/o esophagectomy for CA; developed esophagobronchial vistula    Disorder of liver     LIVER CA    Esophageal cancer (HCC)     completed chemo    Esophageal reflux    Essential hypertension    Exposure to medical diagnostic radiation     last tx 8/18/2022    Frequent urination    Gastroparesis    Heartburn    High blood pressure    High cholesterol     Found when I had quadruple bypass    History of COVID-19     asymptomatic - pt was dx during a hospitalization for another diagnosis. No continued symptoms    History of stomach ulcers    Hyperlipidemia    Hyperlipidemia LDL goal < 70    Indigestion    Morbid obesity with BMI of 40.0-44.9, adult (HCC)    Muscle weakness    Nausea vomiting and diarrhea    Nontoxic multinodular goiter     Dx in 8/2013: pt was told that imaging showed thyroid cysts per PCP    Pancreatic cancer (HCC)     last dose 12/4/2023 is scheduled for another round 12/27/23    Peripheral vascular disease (HCC)     pt denies    Personal history of antineoplastic chemotherapy     for esophageal cancer/completed    Personal history of antineoplastic chemotherapy     pancreatic cancer    Personal history of antineoplastic chemotherapy     Last treatment 3/12/24     Problems with swallowing    Pulmonary nodules     Dx in 8/2013: CT chest showed small bilateral fissural-based lung nodules less than 1 cm    S/P CABG x 4     On 8/16/13: CABG x 4 with LIMA to LAD and SVG to diagonal, OM and PDA    Shortness of breath     when coughing; no oxygen    Vomiting            TX: FOLFOX and trastuzumab     Other pertinent subjective/objective information: recent diet/sx hx obtained;    On 3/20/2024 he had a bronchoscopy with lavage and occluder device placement in the bronchial tree; 1/17/24: EGD with fistula closure with ASD occluder and removal of the bronchial stent ; 1/8/24-1/13/24 for persistent dysphagia and bronchesophageal stent. Plan is for an ASD occluder and removal of the bronchial stent as an outpatient; 5/15/24 PET noted; 5/1/24: Latissimus dorsi flap reconstruction for bronchoesophageal fistula at [place]; other medical hx documented in prior notes      Pertinent Meds:     Medications - Current      Current Outpatient Medications:     oxyCODONE 5 MG Oral Tab, Take 1 tablet (5 mg total) by mouth every 4 (four) hours as needed for Pain., Disp: 40 tablet, Rfl: 0    SERTRALINE 100 MG Oral Tab, TAKE 1.5 TABLETS (150MG TOTAL) BY MOUTH DAILY, Disp: 135 tablet, Rfl: 1    albuterol (2.5 MG/3ML) 0.083% Inhalation Nebu Soln, Take 3 mL (2.5 mg total) by nebulization 3 (three) times daily., Disp: 90 each, Rfl: 0    HYDROcodone-acetaminophen 5-325 MG Oral Tab, Take 1 tablet by mouth every 4 (four) hours as needed for Pain., Disp: 20 tablet, Rfl: 0    metoprolol tartrate 25 MG Oral Tab, Take 1 tablet (25 mg total) by mouth 2x Daily(Beta Blocker)., Disp: 60 tablet, Rfl: 1    morphINE 10 MG/5ML Oral Solution, 5 mL (10 mg total) by Per G Tube route every 6 (six) hours., Disp: 300 mL, Rfl: 0    baclofen 10 MG Oral Tab, Take 1 tablet (10 mg total) by mouth 3 (three) times daily as needed., Disp: , Rfl:     OLANZapine 5 MG Oral Tab, Take 1 tablet (5 mg total) by mouth nightly., Disp: 30 tablet,  Rfl: 3    metoclopramide (REGLAN) 10 MG Oral Tab, Take 1 tablet (10 mg total) by mouth 4 (four) times daily before meals and nightly., Disp: 120 tablet, Rfl: 2    Pancrelipase, Lip-Prot-Amyl, (CREON) 45044-17569 units Oral Cap DR Particles, Take 2 capsules with meals and 1 capsule with snacks, Disp: 240 capsule, Rfl: 0    gabapentin 300 MG Oral Cap, Take 1 capsule (300 mg total) by mouth in the morning and 1 capsule (300 mg total) before bedtime., Disp: 180 capsule, Rfl: 0    losartan 50 MG Oral Tab, Take 1 tablet (50 mg total) by mouth daily., Disp: 90 tablet, Rfl: 2    Omeprazole 40 MG Oral Capsule Delayed Release, Take 1 capsule (40 mg total) by mouth 2 (two) times daily before meals., Disp: 90 capsule, Rfl: 3         Height:  6'1.82\"                    IBW: 190 +/- 10%     WT HX:   07/15/24 74.4 kg (164 lb)   06/27/24 78.7 kg (173 lb 8 oz)   06/03/24 76 kg (167 lb  8 oz)   05/15/24 70.3 kg (155 lb)   04/24/24 67.2 kg (148 lb 3.2 oz)   04/17/24 68.9 kg (152 lb)   04/08/24 68.4 kg (150 lb 14.4 oz)   03/25/24 78.7 kg (173 lb 8 oz)   03/20/24 77.1 kg (170 lb)   03/12/24 82.6 kg (182 lb)         Estimated Nutrition Needs: 30-32 kcals/kg = 9768-9658  KCALS/d as per stated wt; 1.5 gms protein/kg = 112 gms/d     Assessment/Plan:  RD f/u w/ pt and spouse today in tx room. Pt noted, \"I'm eating a lot,\" w/ spouse concurring. Pt has not been taking CREON as rx'd feeling he was told by one of the NPs that he does not need to take? Also, rx ran out.     Pt noted monitoring bilateral LE swelling.     RD offered support noting would f/u w/ oncologist regarding CREON.       The 21st Century Cures Act makes medical notes like these available to patients in the interest of transparency. Please be advised this is a medical document. Medical documents are intended to carry relevant information, facts as evident, and the clinical opinion of the practitioner. The medical note is intended as peer to peer communication and may appear  blunt or direct. It is written in medical language and may contain abbreviations or verbiage that are unfamiliar.

## 2024-07-15 NOTE — PROGRESS NOTES
Pt here for C21D1 Drug(s)folfox, traz, opdivo.  Arrives Ambulating independently, accompanied by Spouse     Patient was evaluated today by Treatment Nurse.    Oral medications included in this regimen:  no    Patient confirms comprehension of cancer treatment schedule:  yes    Pregnancy screening:  Not applicable    Modifications in dose or schedule:  No    Medications appearance and physical integrity checked by RN: yes.    Chemotherapy IV pump settings verified by 2 RNs:  Yes.  Frequency of blood return and site check throughout administration: Prior to administration     Infusion/treatment outcome:  hypersensitivity protocol initiated - see documentation    Education Record    Learner:  Patient  Barriers / Limitations:  None  Method:  Brief focused  Education / instructions given:  chemo admin and supportive meds admin  Outcome:  Shows understanding    Discharged Home, Ambulating independently, accompanied by:Spouse    Patient/family verbalized understanding of future appointments: by printed AVS

## 2024-07-15 NOTE — PROGRESS NOTES
[facility] Visit Note    Patient Name: Dontae Cortez Jr.   YOB: 1970   Medical Record Number: [id number]   Date of visit:  7/15/2024     Chief Complaint/Reason for Visit:  Chief Complaint   Patient presents with    Follow - Up    Chemotherapy      Oncologic history:     -2018: Per report had a normal EGD and colonoscopy     -June 2022: He met with GI due to new onset dysphagia which started about 1 month prior to his visit.  He had an esophagram with a focal abrupt narrowing with irregular margins in the distal one third of the esophagus extending to the GE junction.  He also had an episode of food impaction. He has also lost about 15 lb. He was a heavy smoker from age 14 to about 40 (1.5 PPD). Also with alcohol use currently. Mother with a history of breast and colon cancer.      -EGD and EUS with Dr. Yadav on 6/7/2022: Severe esophagitis with a concerning mass extending 37-39 centimeters from the incisors. Nonobstructive mass.  By EUS uT3N1 disease.  Pathology showed invasive moderate to poorly differentiated adenocarcinoma.  HER2 amplified by FISH     -PET/CT on 6/20/2022: Increased distal esophageal uptake with an SUV of 14.4.  Concern for shreya metastases.     -Concurrent chemoradiation with carboplatin AUC2 plus paclitaxel 50 mg/m2: 7/6/22-8/10/22:  went from 137 to 30.9.      -PET/CT on 9/6/22: showed overall improvement     -Surgery with Dr. Lu on 9/30/22: ypT1b pN0. Recovery has been complicated by an esophageal leak     -Met with Dr. Foster on 12/12/22. We discussed adjuvant opdivo for 1 year. His  was elevated to 48 and repeat was 64. CT CAP was recommended.      -He was admitted on 12/25/22 for abdominal pain. He had a POEM procedure done. He was also noted to have a RLL consolidation. He was seen by pulmonary, based on imaging an outpatient PET/CT was recommended and a biopsy of the most FDG avid lesion would be considered. Noted to have CAD and an outpatient  evaluation was recommended.      -PET/CT on 1/4/23-imaging reviewed in tumor board and no focal areas of concern and recommendation was to start immunotherapy.      -Adjuvant Opdivo: 1/19/2023-3/6/23. During this time, his tumor marker was rising but imaging was negative. Imaging from 4/10/23 was concerning a pancreatic head mass. HE underwent and EGD on 4/7/232 with dr. Hinson. 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 Her 2 IHC was 2+ on this specimen but FISH was negative. Due to these findings, treatment for metastatic esophageal cancer was considered and he was started on FOLFOX + Herceptin + IO.      FOLFOX + Herceptin + Immunotherapy  -C1: 5/1/23:   -C2: 5/15/23:  1454:   -C3: 6/5/23: Switched to Xeloda, Oxaliplatin, Herceptin, Keytruda due to national 5-FU shortage:  329  -C4: 7/10/23: Delayed to the thrombocytopenia:  79 to 35 Will switch back to 5-FU  -C5: 7/24/23:  21.5  -PET/CT from 7/27/23 showed resolution of disease   -C6: 8/7/23:  15.1  -C7: 8/28/23: Ca19-9 Held Oxaliplatin d/t neuropathy.  13.9  -C8: 9/11/23:  12.3 Oxaliplatin held. Signatera negative     -Herceptin and Keytruda maintenance q3 weeks since Signatera was negative   -C1: 9/25/23:  10.3  -C2: 10/9/23: Ca19-9 15. 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 Also noted to have diffuse GGO and MS/Hilar LAD-unclear if this related to previous infections. Given findings, restarting FOLFOX + herceptin + IO  recommended.      -admitted from 1/8/24-1/13/24 for persistent dysphagia and bronchesophageal stent. Plan is for an ASD occluder and removal of the bronchial stent as an outpatient.      -1/17/24: EGD with fistula closure with ASD occluder and removal of the bronchial stent.     -1/22/24: Hercepin + IO treatment only since he was having sternotomy wires removed later in the week      -FOLFOX + Herceptin + Opdivo restarted                -C1: 2/5/24:  846               -C2: 2/22/24:  was not drawn     -Admitted for aspiration PNA on 3/6/24-CT Chest/Abdomen showed extensive patchy consolidative and tree in bud appearance. There are some solid nodular components.. No new sites of disease in abdomen. Stable hepatic lesion. Of note, a right mainstain lesion was biopsied by pulmonary on 2/20/24 and was negative for cancer.                   -C3: 3/12/24:  424     -Admitted on 3/15/24 with dysphagia. This started 1 day  after chemotherapy. CT AP showed stable metastatic disease. Non specific left side small bowl mesentery nodularity could mesenteric panniculitis vs. Disease.      - Admitted 3/20/2024 for nausea/vomiting with malpositioned ASD occluder device. On 3/20/2024 he had a bronchoscopy with lavage and occluder device placement in the bronchial tree.      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 Post-operative uptake in chest wall.      -C5: 6/11/24:  7728    - Hospitalized for SOB + chest pain, found to have PE + RLE DVT. Discharged home on Eliquis     -C6: 6/24/24:  4332   -C7: 7/15/24:  3393    History of Present Illness:     Dontae Goodwin Diego Arechiga. is a 53 year old patient of Dr. Foster with the above oncologic  history who is being treated with FOLFOX + nivolumab + trastuzumab for metastatic esophageal cancer. Presents today for APN evaluation prior to treatment. He is accompanied by his wife.     He has been feeling good. Lower extremity edema is improved when taking furosemide. Appetite is excellent.     Reviewed available chart notes, labs, and imaging.    History:     Past medical, surgical, social and family history reviewed with the patient and updated as appropriate in the chart.    Allergies:  Allergies   Allergen Reactions    Oxaliplatin HIVES     Shaking        Medications:    Current Outpatient Medications:     furosemide 20 MG Oral Tab, Take 1 tablet (20 mg total) by mouth daily., Disp: 30 tablet, Rfl: 1    Potassium Chloride ER 20 MEQ Oral Tab CR, Take 1 tablet by mouth daily., Disp: 30 tablet, Rfl: 1    baclofen 10 MG Oral Tab, Take 1 tablet (10 mg total) by mouth 3 (three) times daily., Disp: 30 tablet, Rfl: 0    oxyCODONE 5 MG Oral Tab, Take 1 tablet (5 mg total) by mouth every 6 (six) hours as needed for Pain., Disp: 60 tablet, Rfl: 0    PANTOPRAZOLE 40 MG Oral Tab EC, TAKE 1 TABLET BY MOUTH EVERY DAY IN THE MORNING BEFORE BREAKFAST, Disp: 30 tablet, Rfl: 0    apixaban 5 MG Oral Tab, Take 1 tablet (5 mg total) by mouth 2 (two) times daily., Disp: 60 tablet, Rfl: 2    OLANZAPINE 5 MG Oral Tab, TAKE 1 TABLET BY MOUTH EVERY DAY AT NIGHT, Disp: 90 tablet, Rfl: 1    SERTRALINE 100 MG Oral Tab, TAKE 1.5 TABLETS (150MG TOTAL) BY MOUTH DAILY, Disp: 135 tablet, Rfl: 1    losartan 50 MG Oral Tab, Take 1 tablet (50 mg total) by mouth daily., Disp: 90 tablet, Rfl: 2    dexamethasone (DECADRON) 4 MG tablet, Take 5 tabs (20 mg) night before and morning of chemotherapy, repeat with each dose (every 2 weeks), Disp: 20 tablet, Rfl: 1    montelukast 10 MG Oral Tab, Take 1 tablet the night before and morning of chemotherapy, repeat with each dose (2 weeks), Disp: 4 tablet, Rfl: 0    METOPROLOL TARTRATE 25 MG Oral Tab, TAKE 1  TABLET (25 MG TOTAL) BY MOUTH 2X DAILY(BETA BLOCKER)., Disp: 60 tablet, Rfl: 1    Review of Systems:  A comprehensive 14 point review of systems was completed.  Pertinent positives and negatives noted in the HPI.    Performance Status: ECOG 1    Vital Signs:   7/15/2024  8:32 AM 7/15/2024  12:18 PM   Oncology Vitals     Height 6' 1.819\"     Height 188 cm     Weight 164 lb     Weight 74.39 kg     BSA (m2) 1.99 m2     BMI 21.16 kg/m2     /92 (H)  190/80 !    Pulse 51  60    Resp 16  18    Temp 97 °F (36.1 °C)     SpO2 99 %  99 %       Physical Examination:  General: Well developed, casually dressed, appropriately groomed, alert and oriented x 3, not in acute distress.  HEENT: Anicteric, conjunctivae and sclerae clear, no oropharyngeal lesion/thrush, mucous membranes are moist  Chest: Clear to auscultation, respirations unlabored.  Heart: Regular rate and rhythm, normal S1/2. No murmur.   Extremities: trace BLE edema.  Neurological: Grossly intact.   Skin: Warm, Dry, No erythema, No rash  Psych/Depression: mood and affect are appropriate.     Labs:     Recent Results (from the past 72 hour(s))   CARBOHYDRATE ANTIGEN 19-9 [E]    Collection Time: 07/15/24  8:17 AM   Result Value Ref Range    Carbohydrate Ag 19-9 3,393.2 (H) <=37.0 U/mL   Comp Metabolic Panel (14)    Collection Time: 07/15/24  8:17 AM   Result Value Ref Range    Glucose 152 (H) 70 - 99 mg/dL    Sodium 140 136 - 145 mmol/L    Potassium 3.3 (L) 3.5 - 5.1 mmol/L    Chloride 108 98 - 112 mmol/L    CO2 27.0 21.0 - 32.0 mmol/L    Anion Gap 5 0 - 18 mmol/L    BUN 5 (L) 9 - 23 mg/dL    Creatinine 0.68 (L) 0.70 - 1.30 mg/dL    Calcium, Total 8.6 8.5 - 10.1 mg/dL    Calculated Osmolality 290 275 - 295 mOsm/kg    eGFR-Cr 110 >=60 mL/min/1.73m2    AST 27 15 - 37 U/L    ALT 32 16 - 61 U/L    Alkaline Phosphatase 116 45 - 117 U/L    Bilirubin, Total 0.3 0.1 - 2.0 mg/dL    Total Protein 6.4 6.4 - 8.2 g/dL    Albumin 2.9 (L) 3.4 - 5.0 g/dL    Globulin  3.5 2.8 -  4.4 g/dL    A/G Ratio 0.8 (L) 1.0 - 2.0    Patient Fasting for CMP? Patient not present    CBC W/ DIFFERENTIAL    Collection Time: 07/15/24  8:17 AM   Result Value Ref Range    WBC 4.0 4.0 - 11.0 x10(3) uL    RBC 4.03 (L) 4.30 - 5.70 x10(6)uL    HGB 12.0 (L) 13.0 - 17.5 g/dL    HCT 37.4 (L) 39.0 - 53.0 %    .0 150.0 - 450.0 10(3)uL    MCV 92.8 80.0 - 100.0 fL    MCH 29.8 26.0 - 34.0 pg    MCHC 32.1 31.0 - 37.0 g/dL    RDW 15.0 %    Neutrophil Absolute Prelim 2.34 1.50 - 7.70 x10 (3) uL    Neutrophil Absolute 2.34 1.50 - 7.70 x10(3) uL    Lymphocyte Absolute 0.79 (L) 1.00 - 4.00 x10(3) uL    Monocyte Absolute 0.67 0.10 - 1.00 x10(3) uL    Eosinophil Absolute 0.10 0.00 - 0.70 x10(3) uL    Basophil Absolute 0.03 0.00 - 0.20 x10(3) uL    Immature Granulocyte Absolute 0.03 0.00 - 1.00 x10(3) uL    Neutrophil % 59.1 %    Lymphocyte % 19.9 %    Monocyte % 16.9 %    Eosinophil % 2.5 %    Basophil % 0.8 %    Immature Granulocyte % 0.8 %   TSH + FREE T4 [E]    Collection Time: 07/15/24  8:17 AM   Result Value Ref Range    Free T4 0.9 0.8 - 1.7 ng/dL    TSH 0.562 0.358 - 3.740 mIU/mL       Impression/Plan    Metastatic esophageal cancer: restarted FOLFOX + trastuzumab + nivo on 6/11/24 after multiple delays for recurrent hospitalizations and surgeries. Tumor markers have been downtrending since resuming therapy. Labs reviewed, proceed with treatment today as planned. Plan for repeat PET after next cycle.     PE + RLE DVT: on apixaban     BLE swelling: improved with furosemide, refill today. Start potassium supplementation as well.     Oxaliplatin infusion related reaction: despite significant premedication (IV solumedrol + IV dex + IV famotidine + IV diphenhydramine). About half way through oxaliplatin reaction, he developed hives and itching. Infusion was interrupted and additional IV benadryl + Famotidine administered. He was observed for 20 min and symptoms did not resolve. Will not rechallenge today. Will attempt  additional premedication with dexamethasone + montelukast prior to treatment in 2 weeks.     Planned Follow Up: 2 weeks for MD + labs + chemo    Risk Level: HIGH - metastatic gastric cancer receiving chemotherapy + immunotherapy with systemic effects requiring intervention and close monitoring     The 21st Century Cures Act makes medical notes like these available to patients in the interest of transparency. Please be advised this is a medical document. Medical documents are intended to carry relevant information, facts as evident, and the clinical opinion of the practitioner. The medical note is intended as peer to peer communication and may appear blunt or direct. It is written in medical language and may contain abbreviations or verbiage that are unfamiliar.     Electronically Signed by:    Grecia Angelo DNP, NP-C  Nurse Practitioner  [facility]

## 2024-07-15 NOTE — PROGRESS NOTES
Patient here for follow-up and planned C21D1 treatment. States pain is managed at this time. Still having some swelling to legs. States it is better with elevation and use of lasix. Appetite levels are improving. Had some nausea and vomiting. Would like to discuss timing of next imaging.

## 2024-07-15 NOTE — TELEPHONE ENCOUNTER
LOV- 1/16/2024     RF- 4/20/2024        Last ordered: 2 months ago (4/20/2024) by Georgette Mahoney MD     Last refill: 5/17/2024

## 2024-07-15 NOTE — PATIENT INSTRUCTIONS
Because of the hives you got with the chemo infusion, we are going to have you take these medications to prevent a reaction:    Montelukast (singulair) -take 1 tablet (10mg total) on night prior and morning of infusion - take one pill tonight  Dexamethasone (decadron)- take 5 tablets (20mg total) on night prior and 5 tablets (20mg total) on morning of infusion

## 2024-07-23 DIAGNOSIS — G89.3 NEOPLASM RELATED PAIN: ICD-10-CM

## 2024-07-23 RX ORDER — OXYCODONE HYDROCHLORIDE 5 MG/1
5 TABLET ORAL EVERY 4 HOURS PRN
Qty: 90 TABLET | Refills: 0 | Status: SHIPPED | OUTPATIENT
Start: 2024-07-23

## 2024-07-26 NOTE — PROGRESS NOTES
[facility] Note    Diagnosis:   Metastatic Esophageal Cancer. Possible 2nd pancreatic primary?  -Initially cT3 N1 Esophageal Adenocarcinoma (stage IIIB)  -HER2 amplified by FISH    Treatment History:   1. Neoadjuvant chemoRT with carbo-taxol: 7/6/22-8/10/22    2. laparoscopic robotic-assisted esophagogastrectomy with feeding jejunostomy tube placement on 9/30/2022.     3. Adjuvant Opdivo: 1/9/23-2/20/23    ---Metastatic disease developed-----    5. Chemo-Herceptin-Immunotherapy: 5/1/23-09/2023 --> Switched to Herceptin/Immunotherapy maintenance on 9/25/23 due to negative signatera.     6. Chemotherapy restarted on 2/5/24 due to progression     Visit Diagnosis:  No diagnosis found.      History of Present Illness: 53M with a PMH of CAD status post CABG, HLD and HTN is here for follow up for metastatic esophageal adenocarcinoma, HER2+. He has either a 2nd pancreatic primary vs. Metastatic esophageal to the pancreas.    Oncology History   -2018: Per report had a normal EGD and colonoscopy    -June 2022: He met with GI due to new onset dysphagia which started about 1 month prior to his visit.  He had an esophagram with a focal abrupt narrowing with irregular margins in the distal one third of the esophagus extending to the GE junction.  He also had an episode of food impaction. He has also lost about 15 lb. He was a heavy smoker from age 14 to about 40 (1.5 PPD). Also with alcohol use currently. Mother with a history of breast and colon cancer.  EGD and EUS with Dr. Yadav on 6/7/2022: Severe esophagitis with a concerning mass extending 37-39 centimeters from the incisors.  Nonobstructive mass.  By EUS uT3N1 disease.  Pathology showed invasive moderate to poorly differentiated adenocarcinoma.  HER2 amplified by FISH  .CT CAP done at [facility] on 6/16/22: Results not loaded to PACs yet.  CA 19-9 from the same day was 107.4.  CEA normal. PET/CT on 6/20/2022: Increased distal  esophageal uptake with an SUV of 14.4.  Concern for shreya metastases.    -Concurrent chemoradiation with carboplatin AUC2 plus paclitaxel 50 mg/m2: July 2022-August 2022 with  down (280-->30). Post treatment PET/CT showed improvement.     -Surgery with Dr. Lu on 9/30/22: ypT1b pN0. Recovery has been complicated by an esophageal leak,     -I met with him on 12/12/22. We discussed adjuvant opdivo for 1 year. His  was elevated to 48 and repeat was 64. CT CAP was recommended.     -He was admitted on 12/25/22 for abdominal pain. He had a POEM procedure done. He was also noted to have a RLL consolidation. He was seen by pulmonary, based on imaging an outpatient PET/CT was recommended and a biopsy of the most FDG avid lesion would be considered. Noted to have CAD and an outpatient evaluation was recommended. Outpatient PET/CT done on 1/4/23 was reviewed in tumor board and there was no focal area of concern and adjuvant immunotherapy recommended.     -Adjuvant opdivo:01/2023-03/2023. PET/CT in in 04/2023 showed uptake in the pancreatic head and tail. Also with some uptake in liver. EGD/EUS on 4/27/23 with Dr. Ritchie: Pancreatic head mass positive for adenocarcinoma: Comparison to previous esophageal cancer shows similarity but IHC in non-specific. Her 2 IHC was 2+ on this specimen but FISH was negative.     -Chemo + Herceptin + Immunotherapy: He received 7 cycles of FOLFOX + Herceptin + Immunotherapy (05/2023-09/2023). Imaging after C5 showed a favorable response. After C7, we held oxaliplatin due to neuropathy and he was started on maintenance herceptin + IO maintenance. Signatera was negative 09/11/23.    -Herceptin + IO Maintenance: He received 3 cycles from 09/25/23-10/31/23. Treatment was delayed due to hospitalizations.     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 Due to new findings-restarting FOLFOX discussed.     -admitted from 1/8/24-1/13/24 for persistent dysphagia and bronchesophageal stent. Plan is for an ASD occluder and removal of the bronchial stent as an outpatient.     -1/17/24: EGD with fistula closure with ASD occluder and removal of the bronchial stent.    -FOLFOX + Herceptin + Opdivo restarted: 3 cycles: 02/2024-03/2024    -Treatment delayed again for recurrent hospital admissions    -He underwent a Latissimus dorsi flap reconstruction for his bronchoesophageal fistula on 5/1/24    -PET/CT 5/15/24: at least 3 areas of uptake in the right hepatic lobe, uptake in pancrease and retroperitoneum. Post-operative uptake in chest wall.     -FOLFOX + Herceptin + Nivolumab Restarted   -6/11/24:  7728   -6/24/24:  4332    -7/15/24:  3393: infusion rxn to oxaliplatin    -7/29/24    Interval History:   -Took his premedications for oxaliplatin  -Energy is good. No nausea/vomiting. Episode of cold sweats after ice cream     [employer] Systems: 12 Point ROS was completed and pertinent positives are in the HPI    Current Outpatient Medications on File Prior to Visit   Medication Sig Dispense Refill    oxyCODONE 5 MG Oral Tab Take 1 tablet (5 mg total) by mouth every 4 (four) hours as needed for Pain. 90 tablet 0    OMEPRAZOLE 40 MG Oral Capsule Delayed Release TAKE 1 CAPSULE BY MOUTH TWICE A DAY BEFORE MEALS 180 capsule 1    furosemide 20 MG Oral Tab Take 1 tablet (20 mg total) by mouth daily. 30 tablet 1    Potassium Chloride ER 20 MEQ Oral Tab CR Take 1 tablet by mouth daily. 30 tablet 1    dexamethasone (DECADRON) 4 MG tablet Take 5 tabs (20 mg) night before and morning of chemotherapy, repeat with each dose (every 2 weeks) 20 tablet 1    montelukast 10 MG Oral Tab Take  1 tablet the night before and morning of chemotherapy, repeat with each dose (2 weeks) 4 tablet 0    METOPROLOL TARTRATE 25 MG Oral Tab TAKE 1 TABLET (25 MG TOTAL) BY MOUTH 2X DAILY(BETA BLOCKER). 60 tablet 1    baclofen 10 MG Oral Tab Take 1 tablet (10 mg total) by mouth 3 (three) times daily. 30 tablet 0    PANTOPRAZOLE 40 MG Oral Tab EC TAKE 1 TABLET BY MOUTH EVERY DAY IN THE MORNING BEFORE BREAKFAST 30 tablet 0    apixaban 5 MG Oral Tab Take 1 tablet (5 mg total) by mouth 2 (two) times daily. 60 tablet 2    OLANZAPINE 5 MG Oral Tab TAKE 1 TABLET BY MOUTH EVERY DAY AT NIGHT 90 tablet 1    SERTRALINE 100 MG Oral Tab TAKE 1.5 TABLETS (150MG TOTAL) BY MOUTH DAILY 135 tablet 1    losartan 50 MG Oral Tab Take 1 tablet (50 mg total) by mouth daily. 90 tablet 2     Current Facility-Administered Medications on File Prior to Visit   Medication Dose Route Frequency Provider Last Rate Last Admin    [COMPLETED] methylPREDNISolone sodium succinate (Solu-MEDROL) injection 125 mg  125 mg Intravenous Once Grecia Angelo APRN   125 mg at 07/15/24 0931    [COMPLETED] diphenhydrAMINE (Benadryl) 50 mg/mL  injection 25 mg  25 mg Intravenous Once Grecia Angelo APRN   25 mg at 07/15/24 0927    [COMPLETED] famotidine (Pepcid) 20 mg/2mL injection 20 mg  20 mg Intravenous Once Grecia Angelo APRN   20 mg at 07/15/24 0925    [COMPLETED] ondansetron (Zofran) 16 mg, dexAMETHasone (Decadron) 20 mg in sodium chloride 0.9% 110 mL IVPB   Intravenous Once Grecia Angelo APRN   Stopped at 07/15/24 1054    [COMPLETED] nivolumab (Opdivo) 240 mg in sodium chloride 0.9% 124 mL IVPB  240 mg Intravenous Once Grecia Angelo APRN   Stopped at 07/15/24 1004    [COMPLETED] trastuzumab-qyyp (Trazimera) 294 mg in sodium chloride 0.9% 264 mL infusion  4 mg/kg (Treatment Plan Recorded) Intravenous Once Grecia Angelo APRN   Stopped at 07/15/24 1035    [COMPLETED] oxaliplatin (Eloxatin) 150 mg in dextrose 5% 280 mL infusion  75 mg/m2 (Treatment Plan  Recorded) Intravenous Once Grecia Angelo APRN   Stopped at 07/15/24 1230    [COMPLETED] leucovorin 800 mg in dextrose 5% 250 mL infusion  400 mg/m2 (Treatment Plan Recorded) Intravenous Once Grecia Angelo APRN   Stopped at 07/15/24 1230    [COMPLETED] diphenhydrAMINE (Benadryl) 50 mg/mL  injection 25 mg  25 mg Intravenous Once Grecia Angelo APRN   25 mg at 07/15/24 1220    [COMPLETED] diphenhydrAMINE (Benadryl) 50 mg/mL  injection 25 mg  25 mg Intravenous Once Grecia Angelo APRN   25 mg at 07/15/24 1230    [COMPLETED] famotidine (Pepcid) 20 mg/2mL injection 20 mg  20 mg Intravenous Once Grecia Angelo APRN   20 mg at 07/15/24 1253    [COMPLETED] atropine sulfate 0.4 mg/mL injection 0.2 mg  0.2 mg Intravenous Once Grecia Angelo APRN   0.2 mg at 07/15/24 1255    [COMPLETED] methylPREDNISolone sodium succinate (Solu-MEDROL) injection 125 mg  125 mg Intravenous Once Katie Conner APRN   125 mg at 06/27/24 1124    [COMPLETED] diphenhydrAMINE (Benadryl) 50 mg/mL  injection 25 mg  25 mg Intravenous Once Katie Conner APRN   25 mg at 06/27/24 1126    [COMPLETED] famotidine (Pepcid) 20 mg/2mL injection 20 mg  20 mg Intravenous Once Katie Conner APRN   20 mg at 06/27/24 1127    [COMPLETED] ondansetron (Zofran) 16 mg, dexAMETHasone (Decadron) 20 mg in sodium chloride 0.9% 110 mL IVPB   Intravenous Once Katie Conner APRN   Stopped at 06/27/24 1212    [COMPLETED] nivolumab (Opdivo) 240 mg in sodium chloride 0.9% 124 mL IVPB  240 mg Intravenous Once Katie Conner APRN   Stopped at 06/27/24 1117    [COMPLETED] trastuzumab-qyyp (Trazimera) 336 mg in sodium chloride 0.9% 266 mL infusion  4 mg/kg (Treatment Plan Recorded) Intravenous Once Katie Conner APRN   Stopped at 06/27/24 1154    [COMPLETED] oxaliplatin (Eloxatin) 155 mg in dextrose 5% 281 mL infusion  75 mg/m2 (Treatment Plan Recorded) Intravenous Once Katie Conner APRN   Stopped at 06/27/24 1500    [COMPLETED] leucovorin 850 mg  in dextrose 5% 250 mL infusion  400 mg/m2 (Treatment Plan Recorded) Intravenous Once Katie Conner APRN   Stopped at 06/27/24 1500    [COMPLETED] HYDROcodone-acetaminophen (Norco)  MG per tab 1 tablet  1 tablet Oral Once Katie Conner APRN   1 tablet at 06/27/24 1254     Past Medical History:    Back problem    Belching    Black stools    Borderline diabetes    Dx in 8/2013 - HgA1C 6.2%    C. difficile diarrhea    pt treated and without symptoms    Chest pain    Coronary artery disease    On 8/16/13: CABG x 4 with LIMA to LAD and SVG to diagonal, OM and PDA    Decorative tattoo    Depression    Difficult intubation    h/o esophagectomy for CA; developed esophagobronchial vistula    Disorder of liver    LIVER CA    Esophageal cancer (HCC)    completed chemo    Esophageal reflux    Essential hypertension    Exposure to medical diagnostic radiation    last tx 8/18/2022    Frequent urination    Gastroparesis    Heartburn    High blood pressure    High cholesterol    Found when I had quadruple bypass    History of COVID-19    asymptomatic - pt was dx during a hospitalization for another diagnosis. No continued symptoms    History of stomach ulcers    Hyperlipidemia    Hyperlipidemia LDL goal < 70    Indigestion    Morbid obesity with BMI of 40.0-44.9, adult (HCC)    Muscle weakness    Nausea vomiting and diarrhea    Nontoxic multinodular goiter    Dx in 8/2013: pt was told that imaging showed thyroid cysts per PCP    Pancreatic cancer (HCC)    last dose 12/4/2023 is scheduled for another round 12/27/23    Peripheral vascular disease (HCC)    pt denies    Personal history of antineoplastic chemotherapy    for esophageal cancer/completed    Personal history of antineoplastic chemotherapy    pancreatic cancer    Personal history of antineoplastic chemotherapy    Last treatment 3/12/24    Problems with swallowing    Pulmonary nodules    Dx in 8/2013: CT chest showed small bilateral fissural-based lung nodules  less than 1 cm    S/P CABG x 4    On 8/16/13: CABG x 4 with LIMA to LAD and SVG to diagonal, OM and PDA    Shortness of breath    when coughing; no oxygen    Vomiting     Past Surgical History:   Procedure Laterality Date    Appendectomy      Appendectomy      Arthroscopy of joint unlisted      right shoulder    Cabg      On 8/16/13: CABG x 4 with LIMA to LAD and SVG to diagonal, OM and PDA    Cardiac cath lab      On 8/14/2013: cardiac cath showed 3-vessel disease    Other surgical history      1.       Laparoscopic robotic-assisted esophagogastrectomy.    Port, indwelling, imp       Social History     Socioeconomic History    Marital status:     Number of children: 3   Occupational History    Occupation: works as  - on workman's comp   Tobacco Use    Smoking status: Former     Current packs/day: 0.00     Average packs/day: 1 pack/day for 27.0 years (27.0 ttl pk-yrs)     Types: Cigarettes     Start date: 8/15/1986     Quit date: 8/15/2013     Years since quitting: 10.9    Smokeless tobacco: Never    Tobacco comments:     Quit smoking 2013   Vaping Use    Vaping status: Never Used   Substance and Sexual Activity    Alcohol use: Never    Drug use: Never    Sexual activity: Not Currently     Partners: Female      Family History   Problem Relation Age of Onset    Cancer Mother         breast and colon     Diabetes Neg        Physical Exam  Height: --  Weight: --  BSA (Calculated - sq m): --  Pulse: --  BP: --  Temp: --  Do Not Use - Resp Rate: --  SpO2: --     General: NAD, AOX3  HEENT: clear op, mmm, no jvd, no scleral icterus  CV: RRR  Extremities: No edema   Lungs:  no increased work of breathing  Abd: soft nt nd +BS no hepatosplenomegaly  Neuro: CN: II-XII grossly intact    Results:  Lab Results   Component Value Date    WBC 4.0 07/15/2024    HGB 12.0 (L) 07/15/2024    HCT 37.4 (L) 07/15/2024    MCV 92.8 07/15/2024    .0 07/15/2024    EOSABS 0.38 04/20/2024     Lab Results   Component  Value Date     07/15/2024    K 3.3 (L) 07/15/2024    CO2 27.0 07/15/2024     07/15/2024    BUN 5 (L) 07/15/2024    PHOS 2.9 03/15/2024    ALB 2.9 (L) 07/15/2024       Lab Results   Component Value Date     12/19/2023       Radiology: reviewed     Pathology: reviewed     Assessment and Plan:  53M with a PMH of CAD status post CABG, HLD and HTN was referred by Dr. Yadav for esophageal cancer.    Metastatic Esophageal Cancer, HER2+  -He had a good response to FOLFOX + immunotherapy + Herceptin. While on maintenance immunotherapy + herceptin he developed progressive disease. He has had many treatment interruptions for surgery, hospitalizations. Most recent PET/CT shows relatively stable disease given the amount of disruption. He restarted FOLFOX + Herceptin + Nivolumab on 6/11/24. Tumor markers are going down . Discussed that signatera not beneficial at this time with metastatic disease. Will repeat a PET/CT after this cycle.   -TTE due in 09/2024    Oxaliplatin infusion reaction: Occurred with last cycle. Additional premedications were given last week with dexamethasone and montelukast prior to treatment.     PE/RLE DVT: Dx 6/18/24: on eliquis indefinitely     Bronch-Esophageal fistula: s/p bronchial stent removal ASD occluder. Now s/p repair 5/1/24.     Neuropathy: stable     LE Swelling: improved with lasix     Cancer related pain: follow up with palliative care     [facility]

## 2024-07-29 ENCOUNTER — OFFICE VISIT (OUTPATIENT)
Dept: HEMATOLOGY/ONCOLOGY | Age: 54
End: 2024-07-29
Attending: INTERNAL MEDICINE
Payer: COMMERCIAL

## 2024-07-29 VITALS
BODY MASS INDEX: 21.75 KG/M2 | RESPIRATION RATE: 16 BRPM | WEIGHT: 169.5 LBS | HEART RATE: 61 BPM | HEIGHT: 73.82 IN | SYSTOLIC BLOOD PRESSURE: 148 MMHG | TEMPERATURE: 98 F | DIASTOLIC BLOOD PRESSURE: 91 MMHG | OXYGEN SATURATION: 99 %

## 2024-07-29 DIAGNOSIS — G89.3 NEOPLASM RELATED PAIN: Primary | ICD-10-CM

## 2024-07-29 DIAGNOSIS — C15.5 MALIGNANT NEOPLASM OF LOWER THIRD OF ESOPHAGUS (HCC): Primary | ICD-10-CM

## 2024-07-29 DIAGNOSIS — C15.5 MALIGNANT NEOPLASM OF LOWER THIRD OF ESOPHAGUS (HCC): ICD-10-CM

## 2024-07-29 LAB
ALBUMIN SERPL-MCNC: 2.7 G/DL (ref 3.4–5)
ALBUMIN/GLOB SERPL: 0.8 {RATIO} (ref 1–2)
ALP LIVER SERPL-CCNC: 131 U/L
ALT SERPL-CCNC: 81 U/L
ANION GAP SERPL CALC-SCNC: 6 MMOL/L (ref 0–18)
AST SERPL-CCNC: 55 U/L (ref 15–37)
BASOPHILS # BLD AUTO: 0 X10(3) UL (ref 0–0.2)
BASOPHILS NFR BLD AUTO: 0 %
BILIRUB SERPL-MCNC: 0.3 MG/DL (ref 0.1–2)
BUN BLD-MCNC: 8 MG/DL (ref 9–23)
CALCIUM BLD-MCNC: 8.7 MG/DL (ref 8.5–10.1)
CANCER AG19-9 SERPL-ACNC: 2973 U/ML (ref ?–35)
CHLORIDE SERPL-SCNC: 105 MMOL/L (ref 98–112)
CO2 SERPL-SCNC: 27 MMOL/L (ref 21–32)
CREAT BLD-MCNC: 0.78 MG/DL
EGFRCR SERPLBLD CKD-EPI 2021: 106 ML/MIN/1.73M2 (ref 60–?)
EOSINOPHIL # BLD AUTO: 0 X10(3) UL (ref 0–0.7)
EOSINOPHIL NFR BLD AUTO: 0 %
ERYTHROCYTE [DISTWIDTH] IN BLOOD BY AUTOMATED COUNT: 14.9 %
GLOBULIN PLAS-MCNC: 3.6 G/DL (ref 2.8–4.4)
GLUCOSE BLD-MCNC: 195 MG/DL (ref 70–99)
HCT VFR BLD AUTO: 36.4 %
HGB BLD-MCNC: 11.9 G/DL
IMM GRANULOCYTES # BLD AUTO: 0.04 X10(3) UL (ref 0–1)
IMM GRANULOCYTES NFR BLD: 0.5 %
LYMPHOCYTES # BLD AUTO: 0.57 X10(3) UL (ref 1–4)
LYMPHOCYTES NFR BLD AUTO: 7.2 %
MCH RBC QN AUTO: 29.8 PG (ref 26–34)
MCHC RBC AUTO-ENTMCNC: 32.7 G/DL (ref 31–37)
MCV RBC AUTO: 91.2 FL
MONOCYTES # BLD AUTO: 0.12 X10(3) UL (ref 0.1–1)
MONOCYTES NFR BLD AUTO: 1.5 %
NEUTROPHILS # BLD AUTO: 7.18 X10 (3) UL (ref 1.5–7.7)
NEUTROPHILS # BLD AUTO: 7.18 X10(3) UL (ref 1.5–7.7)
NEUTROPHILS NFR BLD AUTO: 90.8 %
OSMOLALITY SERPL CALC.SUM OF ELEC: 290 MOSM/KG (ref 275–295)
PLATELET # BLD AUTO: 181 10(3)UL (ref 150–450)
POTASSIUM SERPL-SCNC: 3.5 MMOL/L (ref 3.5–5.1)
PROT SERPL-MCNC: 6.3 G/DL (ref 6.4–8.2)
RBC # BLD AUTO: 3.99 X10(6)UL
SODIUM SERPL-SCNC: 138 MMOL/L (ref 136–145)
WBC # BLD AUTO: 7.9 X10(3) UL (ref 4–11)

## 2024-07-29 PROCEDURE — 99215 OFFICE O/P EST HI 40 MIN: CPT | Performed by: INTERNAL MEDICINE

## 2024-07-29 PROCEDURE — 96376 TX/PRO/DX INJ SAME DRUG ADON: CPT

## 2024-07-29 PROCEDURE — S0028 INJECTION, FAMOTIDINE, 20 MG: HCPCS | Performed by: INTERNAL MEDICINE

## 2024-07-29 PROCEDURE — 96375 TX/PRO/DX INJ NEW DRUG ADDON: CPT

## 2024-07-29 PROCEDURE — 85025 COMPLETE CBC W/AUTO DIFF WBC: CPT

## 2024-07-29 PROCEDURE — 96417 CHEMO IV INFUS EACH ADDL SEQ: CPT

## 2024-07-29 PROCEDURE — 86301 IMMUNOASSAY TUMOR CA 19-9: CPT

## 2024-07-29 PROCEDURE — S0028 INJECTION, FAMOTIDINE, 20 MG: HCPCS | Performed by: NURSE PRACTITIONER

## 2024-07-29 PROCEDURE — 96413 CHEMO IV INFUSION 1 HR: CPT

## 2024-07-29 PROCEDURE — 96415 CHEMO IV INFUSION ADDL HR: CPT

## 2024-07-29 PROCEDURE — 96368 THER/DIAG CONCURRENT INF: CPT

## 2024-07-29 PROCEDURE — 80053 COMPREHEN METABOLIC PANEL: CPT

## 2024-07-29 RX ORDER — FLUOROURACIL 50 MG/ML
2400 INJECTION, SOLUTION INTRAVENOUS CONTINUOUS
Status: DISCONTINUED | OUTPATIENT
Start: 2024-07-29 | End: 2024-07-29

## 2024-07-29 RX ORDER — DIPHENHYDRAMINE HYDROCHLORIDE 50 MG/ML
25 INJECTION INTRAMUSCULAR; INTRAVENOUS ONCE
Status: CANCELLED | OUTPATIENT
Start: 2024-07-29 | End: 2024-07-29

## 2024-07-29 RX ORDER — METHYLPREDNISOLONE SODIUM SUCCINATE 125 MG/2ML
125 INJECTION, POWDER, LYOPHILIZED, FOR SOLUTION INTRAMUSCULAR; INTRAVENOUS ONCE
Status: COMPLETED | OUTPATIENT
Start: 2024-07-29 | End: 2024-07-29

## 2024-07-29 RX ORDER — BACLOFEN 10 MG/1
10 TABLET ORAL 3 TIMES DAILY
Qty: 30 TABLET | Refills: 0 | Status: SHIPPED | OUTPATIENT
Start: 2024-07-29

## 2024-07-29 RX ORDER — FAMOTIDINE 10 MG/ML
20 INJECTION, SOLUTION INTRAVENOUS ONCE
Status: COMPLETED | OUTPATIENT
Start: 2024-07-29 | End: 2024-07-29

## 2024-07-29 RX ORDER — PROCHLORPERAZINE MALEATE 10 MG
10 TABLET ORAL ONCE
Status: COMPLETED | OUTPATIENT
Start: 2024-07-29 | End: 2024-07-29

## 2024-07-29 RX ORDER — FLUOROURACIL 50 MG/ML
2400 INJECTION, SOLUTION INTRAVENOUS CONTINUOUS
Status: CANCELLED | OUTPATIENT
Start: 2024-07-29

## 2024-07-29 RX ORDER — FAMOTIDINE 10 MG/ML
20 INJECTION, SOLUTION INTRAVENOUS ONCE
Status: CANCELLED | OUTPATIENT
Start: 2024-07-29 | End: 2024-07-29

## 2024-07-29 RX ORDER — METHYLPREDNISOLONE SODIUM SUCCINATE 125 MG/2ML
125 INJECTION, POWDER, LYOPHILIZED, FOR SOLUTION INTRAMUSCULAR; INTRAVENOUS ONCE
Status: CANCELLED | OUTPATIENT
Start: 2024-07-29 | End: 2024-07-29

## 2024-07-29 RX ORDER — DIPHENHYDRAMINE HYDROCHLORIDE 50 MG/ML
25 INJECTION INTRAMUSCULAR; INTRAVENOUS ONCE
Status: COMPLETED | OUTPATIENT
Start: 2024-07-29 | End: 2024-07-29

## 2024-07-29 RX ADMIN — FAMOTIDINE 20 MG: 10 INJECTION, SOLUTION INTRAVENOUS at 13:14:00

## 2024-07-29 RX ADMIN — DIPHENHYDRAMINE HYDROCHLORIDE 25 MG: 50 INJECTION INTRAMUSCULAR; INTRAVENOUS at 13:30:00

## 2024-07-29 RX ADMIN — FLUOROURACIL 4800 MG: 50 INJECTION, SOLUTION INTRAVENOUS at 13:56:00

## 2024-07-29 RX ADMIN — DIPHENHYDRAMINE HYDROCHLORIDE 25 MG: 50 INJECTION INTRAMUSCULAR; INTRAVENOUS at 11:10:00

## 2024-07-29 RX ADMIN — PROCHLORPERAZINE MALEATE 10 MG: 10 MG TABLET ORAL at 13:14:00

## 2024-07-29 RX ADMIN — FAMOTIDINE 20 MG: 10 INJECTION, SOLUTION INTRAVENOUS at 11:26:00

## 2024-07-29 RX ADMIN — METHYLPREDNISOLONE SODIUM SUCCINATE 125 MG: 125 INJECTION, POWDER, LYOPHILIZED, FOR SOLUTION INTRAMUSCULAR; INTRAVENOUS at 11:26:00

## 2024-07-29 NOTE — PROGRESS NOTES
Patient here for follow-up and planned C22D1 treatment. Still has bilateral lower extremity swelling. Remains on lasix. Denies any pain, nausea, or vomiting. Appetite levels are fair. Would like to talk with palliative APN regarding anxiety. Took steriods prior to treatment as instructed.

## 2024-07-29 NOTE — PROGRESS NOTES
Pt here for C21D1 Drug(s)folfox, traz, opdivo.  Arrives Ambulating independently     Patient was evaluated today by MD.     Oral medications included in this regimen:  yes- dexamethasone yesterday and this AM     Patient confirms comprehension of cancer treatment schedule:  yes     Pregnancy screening:  Not applicable     Modifications in dose or schedule:  No     Medications appearance and physical integrity checked by RN: yes.     Chemotherapy IV pump settings verified by 2 RNs:  Yes.  Frequency of blood return and site check throughout administration: Prior to administration      Infusion/treatment outcome:  hypersensitivity protocol initiated - see documentation. Pt c/o itching, nausea and epigastric pain. Infusion stopped. kee Paige notified. Orders received. Pt not re-challenged. Symptoms resolved. CADD pump connected and pt sent home in stable condition, no complaints. Instructed to take singular when gets home     Education Record     Learner:  Patient  Barriers / Limitations:  None  Method:  Brief focused  Education / instructions given:  chemo admin and supportive meds admin  Outcome:  Shows understanding  Pt dc home ambulatory in stable condition, no new complaints. Will get fu from [facility].

## 2024-07-30 ENCOUNTER — APPOINTMENT (OUTPATIENT)
Dept: HEMATOLOGY/ONCOLOGY | Age: 54
End: 2024-07-30
Attending: INTERNAL MEDICINE
Payer: COMMERCIAL

## 2024-08-02 ENCOUNTER — HOSPITAL ENCOUNTER (OUTPATIENT)
Dept: NUCLEAR MEDICINE | Facility: HOSPITAL | Age: 54
Discharge: HOME | End: 2024-08-02
Attending: INTERNAL MEDICINE
Payer: COMMERCIAL

## 2024-08-02 ENCOUNTER — OFFICE VISIT (OUTPATIENT)
Dept: HEMATOLOGY/ONCOLOGY | Facility: HOSPITAL | Age: 54
End: 2024-08-02
Attending: INTERNAL MEDICINE
Payer: COMMERCIAL

## 2024-08-02 ENCOUNTER — HOSPITAL ENCOUNTER (OUTPATIENT)
Dept: NUCLEAR MEDICINE | Facility: HOSPITAL | Age: 54
End: 2024-08-02
Attending: INTERNAL MEDICINE
Payer: COMMERCIAL

## 2024-08-02 VITALS
RESPIRATION RATE: 16 BRPM | TEMPERATURE: 98 F | SYSTOLIC BLOOD PRESSURE: 162 MMHG | BODY MASS INDEX: 21 KG/M2 | OXYGEN SATURATION: 99 % | WEIGHT: 163.63 LBS | DIASTOLIC BLOOD PRESSURE: 76 MMHG | HEART RATE: 76 BPM | HEIGHT: 73.82 IN

## 2024-08-02 DIAGNOSIS — G89.3 NEOPLASM RELATED PAIN: ICD-10-CM

## 2024-08-02 DIAGNOSIS — Z51.5 PALLIATIVE CARE BY SPECIALIST: ICD-10-CM

## 2024-08-02 DIAGNOSIS — G89.3 NEOPLASM RELATED PAIN: Primary | ICD-10-CM

## 2024-08-02 DIAGNOSIS — F41.9 ANXIETY: ICD-10-CM

## 2024-08-02 DIAGNOSIS — C15.9 ESOPHAGEAL CARCINOMA (HCC): ICD-10-CM

## 2024-08-02 DIAGNOSIS — C15.5 MALIGNANT NEOPLASM OF LOWER THIRD OF ESOPHAGUS (HCC): ICD-10-CM

## 2024-08-02 PROCEDURE — 99214 OFFICE O/P EST MOD 30 MIN: CPT | Performed by: NURSE PRACTITIONER

## 2024-08-02 RX ORDER — PREGABALIN 25 MG/1
25 CAPSULE ORAL NIGHTLY
Qty: 30 CAPSULE | Refills: 1 | Status: SHIPPED | OUTPATIENT
Start: 2024-08-02

## 2024-08-02 NOTE — PROGRESS NOTES
Palliative Care Follow Up Note     Patient Name: Dontae Cortez Jr.   YOB: 1970   Medical Record Number: [id number]   CSN: [id number]   Date of visit: 8/2/2024     Chief Complaint/Reason for Visit:  Follow up for symptoms     History of Present Illness:         Dontae Cortez Jr. is a 53 year old male with metastatic esophageal cancer receiving chemotherapy.  He complains of R rib, mid and upper back pain, R shoulder pain that has persisted since his esophageal surgery.  He feels like its on fire.  This is his most problematic pain.  He feels the pain is tolerable during the day except after PT.  The pain wakes him at night.  He is using 3 oxycodone daily with benefit.  He hs not tolerated gabapentin in the past.  He would like to minimize or no t rely on oxycodone as much as possible.  He denies any SE.  It can be sensitive to touch on rib area.    Topical lidocaine patches don't offer benefit.    He is trying to be more active but the rib pain can be intense with increased movement affecting endurance.  He denies constipation, N/V.   He has an appetite.    His other complaint is he feels he has been more short lately with a lower threshold for frustration.  He denies feeling tearful.  He is able to concentration and remains motivated to do things.  He is able to fall asleep.  He may be more anxious.      Problem List:  Patient Active Problem List   Diagnosis    Primary hypertension    Hyperlipidemia with target low density lipoprotein (LDL) cholesterol less than 70 mg/dL    Coronary artery disease involving coronary bypass graft of native heart with angina pectoris (HCC)    S/P CABG x 4    Nontoxic multinodular goiter    Pulmonary nodules    Thrombophlebitis leg    BRANDAN (generalized anxiety disorder)    Right shoulder Arthroscopy acromioplasty ,distal  clavicle resection, rotator cuff/labral debridment  Global 05/13/2021    Right bicipital tenosynovitis    Malignant neoplasm of esophagus  (HCC)    Pleural effusion    Esophageal anastomotic leak    Esophageal obstruction    On total parenteral nutrition (TPN)    Mechanical complication of esophagostomy (HCC)    Abdominal pain of unknown etiology    Migration of esophageal stent    Gastroparesis    Esophageal carcinoma (HCC)    Normocytic anemia    Esophageal fistula    Subacute cough    Acquired bronchoesophageal fistula (HCC)    Pneumonia of both lower lobes due to infectious organism    Malignant neoplasm of pancreas (HCC)    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 difficile colitis    Dysphagia, unspecified type    Dyspnea and respiratory abnormalities    Hypokalemia    Dysphagia    Narcotic drug use    History of esophageal cancer    Palliative care by specialist    Neoplasm related pain    Endobronchial mass    Hyponatremia    Thrombocytopenia (HCC)    Acute kidney injury (HCC)    Hyperglycemia    Aspiration pneumonitis (HCC)    C. difficile diarrhea    Aspiration pneumonia (HCC)    Malignant neoplasm metastatic to liver (HCC)    Hyperlipidemia    Nausea vomiting and diarrhea    Fistula, bronchoesophageal (HCC)    Iron deficiency anemia, unspecified iron deficiency anemia type    Azotemia    Palliative care encounter    Goals of care, counseling/discussion    Cancer related pain    Anemia    Fistula    Acute cough    Pneumonia of right lung due to infectious organism, unspecified part of lung    Bacteremia    Acute postoperative pain    Exocrine pancreatic insufficiency (HCC)    Hypertriglyceridemia    Acute pulmonary embolism without acute cor pulmonale, unspecified pulmonary embolism type (HCC)    Acute deep vein thrombosis (DVT) of  popliteal vein of right lower extremity (HCC)    Altered mental status, unspecified altered mental status type      Medical History:  Past Medical History:    Back problem    Belching    Black stools    Borderline diabetes    Dx in 8/2013 - HgA1C 6.2%    C. difficile diarrhea    pt treated and without symptoms    Chest pain    Coronary artery disease    On 8/16/13: CABG x 4 with LIMA to LAD and SVG to diagonal, OM and PDA    Decorative tattoo    Depression    Difficult intubation    h/o esophagectomy for CA; developed esophagobronchial vistula    Disorder of liver    LIVER CA    Esophageal cancer (HCC)    completed chemo    Esophageal reflux    Essential hypertension    Exposure to medical diagnostic radiation    last tx 8/18/2022    Frequent urination    Gastroparesis    Heartburn    High blood pressure    High cholesterol    Found when I had quadruple bypass    History of COVID-19    asymptomatic - pt was dx during a hospitalization for another diagnosis. No continued symptoms    History of stomach ulcers    Hyperlipidemia    Hyperlipidemia LDL goal < 70    Indigestion    Morbid obesity with BMI of 40.0-44.9, adult (HCC)    Muscle weakness    Nausea vomiting and diarrhea    Nontoxic multinodular goiter    Dx in 8/2013: pt was told that imaging showed thyroid cysts per PCP    Pancreatic cancer (HCC)    last dose 12/4/2023 is scheduled for another round 12/27/23    Peripheral vascular disease (HCC)    pt denies    Personal history of antineoplastic chemotherapy    for esophageal cancer/completed    Personal history of antineoplastic chemotherapy    pancreatic cancer    Personal history of antineoplastic chemotherapy    Last treatment 3/12/24    Problems with swallowing    Pulmonary nodules    Dx in 8/2013: CT chest showed small bilateral fissural-based lung nodules less than 1 cm    S/P CABG x 4    On 8/16/13: CABG x 4 with LIMA to LAD and SVG to diagonal, OM and PDA    Shortness of breath    when coughing; no  oxygen    Vomiting     Surgical History:  Past Surgical History:   Procedure Laterality Date    Appendectomy      Appendectomy      Arthroscopy of joint unlisted      right shoulder    Cabg      On 8/16/13: CABG x 4 with LIMA to LAD and SVG to diagonal, OM and PDA    Cardiac cath lab      On 8/14/2013: cardiac cath showed 3-vessel disease    Other surgical history      1.       Laparoscopic robotic-assisted esophagogastrectomy.    Port, indwelling, imp         Allergies:  Allergies   Allergen Reactions    Oxaliplatin HIVES     Shaking        Palliative Care Social History:    Marital Status: Alysha  Children:   Living Situation: independent with spouse      Medications:  Current Outpatient Medications   Medication Sig Dispense Refill    pregabalin 25 MG Oral Cap Take 1 capsule (25 mg total) by mouth at bedtime. 30 capsule 1    baclofen 10 MG Oral Tab Take 1 tablet (10 mg total) by mouth 3 (three) times daily. 30 tablet 0    oxyCODONE 5 MG Oral Tab Take 1 tablet (5 mg total) by mouth every 4 (four) hours as needed for Pain. 90 tablet 0    OMEPRAZOLE 40 MG Oral Capsule Delayed Release TAKE 1 CAPSULE BY MOUTH TWICE A DAY BEFORE MEALS (Patient not taking: Reported on 7/29/2024) 180 capsule 1    furosemide 20 MG Oral Tab Take 1 tablet (20 mg total) by mouth daily. 30 tablet 1    Potassium Chloride ER 20 MEQ Oral Tab CR Take 1 tablet by mouth daily. (Patient not taking: Reported on 7/29/2024) 30 tablet 1    dexamethasone (DECADRON) 4 MG tablet Take 5 tabs (20 mg) night before and morning of chemotherapy, repeat with each dose (every 2 weeks) 20 tablet 1    montelukast 10 MG Oral Tab Take 1 tablet the night before and morning of chemotherapy, repeat with each dose (2 weeks) 4 tablet 0    METOPROLOL TARTRATE 25 MG Oral Tab TAKE 1 TABLET (25 MG TOTAL) BY MOUTH 2X DAILY(BETA BLOCKER). 60 tablet 1    PANTOPRAZOLE 40 MG Oral Tab EC TAKE 1 TABLET BY MOUTH EVERY DAY IN THE MORNING BEFORE BREAKFAST (Patient not taking: Reported on  7/29/2024) 30 tablet 0    apixaban 5 MG Oral Tab Take 1 tablet (5 mg total) by mouth 2 (two) times daily. 60 tablet 2    OLANZAPINE 5 MG Oral Tab TAKE 1 TABLET BY MOUTH EVERY DAY AT NIGHT (Patient not taking: Reported on 7/29/2024) 90 tablet 1    SERTRALINE 100 MG Oral Tab TAKE 1.5 TABLETS (150MG TOTAL) BY MOUTH DAILY 135 tablet 1    losartan 50 MG Oral Tab Take 1 tablet (50 mg total) by mouth daily. 90 tablet 2       Review of Systems:  General:  Fatigue.  Feels well.    Respiratory:  Denies SOB, denies cough  Cardiac:  Denies chest pain, heart palpitations  Abdomen:  Denies constipation, diarrhea.  Denies pain.    Psych:  No complaints.  Sleeping well    Palliative Performance Scale:   100%    Physical Examination:  General: Patient is alert and oriented, not in acute distress.  Respiratory:  Normal excursions and effort  Cardiac: Regular rate   Abdomen: Soft, non tender   Musculoskeletal: Normal gait.  Psych:  Mood/Affect appropriate    Advanced Directives Discussed and Completed:     HCPOA/Health Surrogate:    There is a completed HCPOA documentation on file in Epic.    POLST Discussed and Completed:  symptoms were focus    Palliative Care:  His pain is neuropathic requiring oxycodone.  He didn't tolerate gabapentin in the past.  Will trial low dose lyrica at night when pain is most problematic.  If tolerated, can slowly titrate.  If not tolerated. He knows to stop taking it.    Will increase sertraline to optimize mood.  He has been using this for many years.  Will see if this and better pain control and sleep also helps with mood.      Impression/Plan:   1. Neoplasm related pain  Oxycodone 5mg Q 4 prn   Pregabalin 25mg daily    2.  Mood  Sertraline 200mg daily  Pain control    Planned Follow up:  1 month      I spent a total of 35 minutes with the patient today, which included all of the following:direct face to face contact, history taking, physical examination, and >50% was spent counseling and coordinating  care      The 21st Century Cures Act makes medical notes like these available to patients in the interest of transparency. Please be advised this is a medical document. Medical documents are intended to carry relevant information, facts as evident, and the clinical opinion of the practitioner. The medical note is intended as peer to peer communication and may appear blunt or direct. It is written in medical language and may contain abbreviations or verbiage that are unfamiliar.       Electronically Signed by:  ARTEMIO TERRY Outpatient Palliative Nurse Practitioner

## 2024-08-08 ENCOUNTER — HOSPITAL ENCOUNTER (OUTPATIENT)
Dept: NUCLEAR MEDICINE | Facility: HOSPITAL | Age: 54
Discharge: HOME | End: 2024-08-08
Attending: INTERNAL MEDICINE
Payer: COMMERCIAL

## 2024-08-08 LAB — GLUCOSE BLD-MCNC: 76 MG/DL (ref 70–99)

## 2024-08-08 PROCEDURE — 82962 GLUCOSE BLOOD TEST: CPT

## 2024-08-08 PROCEDURE — 78815 PET IMAGE W/CT SKULL-THIGH: CPT | Performed by: INTERNAL MEDICINE

## 2024-08-12 ENCOUNTER — APPOINTMENT (OUTPATIENT)
Dept: HEMATOLOGY/ONCOLOGY | Age: 54
End: 2024-08-12
Attending: INTERNAL MEDICINE
Payer: COMMERCIAL

## 2024-08-13 ENCOUNTER — PATIENT MESSAGE (OUTPATIENT)
Dept: HEMATOLOGY/ONCOLOGY | Facility: HOSPITAL | Age: 54
End: 2024-08-13

## 2024-08-14 ENCOUNTER — OFFICE VISIT (OUTPATIENT)
Dept: HEMATOLOGY/ONCOLOGY | Age: 54
End: 2024-08-14
Attending: INTERNAL MEDICINE
Payer: COMMERCIAL

## 2024-08-14 VITALS
BODY MASS INDEX: 21.62 KG/M2 | WEIGHT: 168.5 LBS | HEART RATE: 55 BPM | RESPIRATION RATE: 16 BRPM | HEIGHT: 73.82 IN | SYSTOLIC BLOOD PRESSURE: 132 MMHG | OXYGEN SATURATION: 100 % | DIASTOLIC BLOOD PRESSURE: 71 MMHG | TEMPERATURE: 97 F

## 2024-08-14 DIAGNOSIS — Z51.12 ENCOUNTER FOR ANTINEOPLASTIC IMMUNOTHERAPY: ICD-10-CM

## 2024-08-14 DIAGNOSIS — I82.431 ACUTE DEEP VEIN THROMBOSIS (DVT) OF POPLITEAL VEIN OF RIGHT LOWER EXTREMITY (HCC): ICD-10-CM

## 2024-08-14 DIAGNOSIS — G89.3 NEOPLASM RELATED PAIN: ICD-10-CM

## 2024-08-14 DIAGNOSIS — R60.0 BILATERAL LOWER EXTREMITY EDEMA: ICD-10-CM

## 2024-08-14 DIAGNOSIS — E87.6 HYPOKALEMIA: ICD-10-CM

## 2024-08-14 DIAGNOSIS — C15.5 MALIGNANT NEOPLASM OF LOWER THIRD OF ESOPHAGUS (HCC): Primary | ICD-10-CM

## 2024-08-14 DIAGNOSIS — C78.89 METASTATIC ADENOCARCINOMA TO PANCREAS (HCC): ICD-10-CM

## 2024-08-14 DIAGNOSIS — Z51.11 ENCOUNTER FOR CHEMOTHERAPY MANAGEMENT: ICD-10-CM

## 2024-08-14 DIAGNOSIS — I26.99 ACUTE PULMONARY EMBOLISM WITHOUT ACUTE COR PULMONALE, UNSPECIFIED PULMONARY EMBOLISM TYPE (HCC): ICD-10-CM

## 2024-08-14 LAB
ALBUMIN SERPL-MCNC: 2.5 G/DL (ref 3.4–5)
ALBUMIN/GLOB SERPL: 0.7 {RATIO} (ref 1–2)
ALP LIVER SERPL-CCNC: 128 U/L
ALT SERPL-CCNC: 79 U/L
ANION GAP SERPL CALC-SCNC: 9 MMOL/L (ref 0–18)
AST SERPL-CCNC: 37 U/L (ref 15–37)
BASOPHILS # BLD AUTO: 0 X10(3) UL (ref 0–0.2)
BASOPHILS NFR BLD AUTO: 0 %
BILIRUB SERPL-MCNC: 0.3 MG/DL (ref 0.1–2)
BUN BLD-MCNC: 10 MG/DL (ref 9–23)
CALCIUM BLD-MCNC: 8.4 MG/DL (ref 8.5–10.1)
CANCER AG19-9 SERPL-ACNC: 4553 U/ML (ref ?–35)
CHLORIDE SERPL-SCNC: 106 MMOL/L (ref 98–112)
CO2 SERPL-SCNC: 24 MMOL/L (ref 21–32)
CREAT BLD-MCNC: 0.73 MG/DL
EGFRCR SERPLBLD CKD-EPI 2021: 108 ML/MIN/1.73M2 (ref 60–?)
EOSINOPHIL # BLD AUTO: 0 X10(3) UL (ref 0–0.7)
EOSINOPHIL NFR BLD AUTO: 0 %
ERYTHROCYTE [DISTWIDTH] IN BLOOD BY AUTOMATED COUNT: 15.3 %
GLOBULIN PLAS-MCNC: 3.5 G/DL (ref 2.8–4.4)
GLUCOSE BLD-MCNC: 294 MG/DL (ref 70–99)
HCT VFR BLD AUTO: 33.4 %
HGB BLD-MCNC: 10.6 G/DL
IMM GRANULOCYTES # BLD AUTO: 0.02 X10(3) UL (ref 0–1)
IMM GRANULOCYTES NFR BLD: 0.8 %
LYMPHOCYTES # BLD AUTO: 0.39 X10(3) UL (ref 1–4)
LYMPHOCYTES NFR BLD AUTO: 15.3 %
MCH RBC QN AUTO: 29.8 PG (ref 26–34)
MCHC RBC AUTO-ENTMCNC: 31.7 G/DL (ref 31–37)
MCV RBC AUTO: 93.8 FL
MONOCYTES # BLD AUTO: 0.08 X10(3) UL (ref 0.1–1)
MONOCYTES NFR BLD AUTO: 3.1 %
NEUTROPHILS # BLD AUTO: 2.06 X10 (3) UL (ref 1.5–7.7)
NEUTROPHILS # BLD AUTO: 2.06 X10(3) UL (ref 1.5–7.7)
NEUTROPHILS NFR BLD AUTO: 80.8 %
OSMOLALITY SERPL CALC.SUM OF ELEC: 298 MOSM/KG (ref 275–295)
PLATELET # BLD AUTO: 184 10(3)UL (ref 150–450)
POTASSIUM SERPL-SCNC: 3.3 MMOL/L (ref 3.5–5.1)
PROT SERPL-MCNC: 6 G/DL (ref 6.4–8.2)
RBC # BLD AUTO: 3.56 X10(6)UL
SODIUM SERPL-SCNC: 139 MMOL/L (ref 136–145)
WBC # BLD AUTO: 2.6 X10(3) UL (ref 4–11)

## 2024-08-14 PROCEDURE — S0028 INJECTION, FAMOTIDINE, 20 MG: HCPCS | Performed by: NURSE PRACTITIONER

## 2024-08-14 PROCEDURE — 99215 OFFICE O/P EST HI 40 MIN: CPT | Performed by: NURSE PRACTITIONER

## 2024-08-14 PROCEDURE — 96413 CHEMO IV INFUSION 1 HR: CPT

## 2024-08-14 PROCEDURE — 86301 IMMUNOASSAY TUMOR CA 19-9: CPT

## 2024-08-14 PROCEDURE — 96375 TX/PRO/DX INJ NEW DRUG ADDON: CPT

## 2024-08-14 PROCEDURE — 80053 COMPREHEN METABOLIC PANEL: CPT

## 2024-08-14 PROCEDURE — 85025 COMPLETE CBC W/AUTO DIFF WBC: CPT

## 2024-08-14 PROCEDURE — 96367 TX/PROPH/DG ADDL SEQ IV INF: CPT

## 2024-08-14 PROCEDURE — 96417 CHEMO IV INFUS EACH ADDL SEQ: CPT

## 2024-08-14 RX ORDER — OXYCODONE HYDROCHLORIDE 5 MG/1
5 TABLET ORAL EVERY 6 HOURS PRN
Qty: 90 TABLET | Refills: 0 | Status: SHIPPED | OUTPATIENT
Start: 2024-08-14

## 2024-08-14 RX ORDER — FLUOROURACIL 50 MG/ML
2400 INJECTION, SOLUTION INTRAVENOUS CONTINUOUS
Status: CANCELLED | OUTPATIENT
Start: 2024-08-14

## 2024-08-14 RX ORDER — DICYCLOMINE HYDROCHLORIDE 10 MG/1
10 CAPSULE ORAL 3 TIMES DAILY PRN
Qty: 90 CAPSULE | Refills: 1 | Status: SHIPPED | OUTPATIENT
Start: 2024-08-14

## 2024-08-14 RX ORDER — FAMOTIDINE 10 MG/ML
20 INJECTION, SOLUTION INTRAVENOUS ONCE
Status: COMPLETED | OUTPATIENT
Start: 2024-08-14 | End: 2024-08-14

## 2024-08-14 RX ORDER — FLUOROURACIL 50 MG/ML
2400 INJECTION, SOLUTION INTRAVENOUS CONTINUOUS
Status: DISCONTINUED | OUTPATIENT
Start: 2024-08-14 | End: 2024-08-14

## 2024-08-14 RX ADMIN — FAMOTIDINE 20 MG: 10 INJECTION, SOLUTION INTRAVENOUS at 11:53:00

## 2024-08-14 RX ADMIN — FLUOROURACIL 4800 MG: 50 INJECTION, SOLUTION INTRAVENOUS at 12:29:00

## 2024-08-14 NOTE — PROGRESS NOTES
Pt here for C23D1 Drug(s)Opdivo, Trazimera, Lecovorin, 5FU.  Arrives Ambulating independently, accompanied by Self     Patient was evaluated today by LD and Treatment Nurse.    Oral medications included in this regimen:  no    Patient confirms comprehension of cancer treatment schedule:  yes    Pregnancy screening:  Not applicable    Modifications in dose or schedule:  Yes. No oxaliplatin today. Next treatment will be on Tuesday 8/27 instead of 8/26 so pt can meet with Ana Luisa.     Medications appearance and physical integrity checked by RN: yes.    Chemotherapy IV pump settings verified by 2 RNs:  Yes.  Frequency of blood return and site check throughout administration: Prior to administration, Prior to each drug, Every 2-3 ml IVP, and At completion of therapy     Infusion/treatment outcome:  patient tolerated treatment without incident    Education Record    Learner:  Patient  Barriers / Limitations:  None  Method:  Brief focused and Discussion  Education / instructions given:  Plan of care and next appointment reviewed. C/o \"stomach ache\" during treatment. Per Grecia, 20mg IV pepcid. Given with relief. K 3.3. Pt aware to take an extra oral potassium tablet today when he gets home. Pt requested refill for oxy and bentyl. Per Molly, refills sent per Ana Luisa.  CADD pump connected and running. All connections taped and secured.  Outcome:  Shows understanding    Discharged Home, Ambulating independently, accompanied by:Self in stable condition, no new complaints.    Patient/family verbalized understanding of future appointments: by [employer] messaging

## 2024-08-14 NOTE — PROGRESS NOTES
Education Record    Learner:  Patient    Disease / Diagnosis: Esophageal    Barriers / Limitations:  None   Comments:    Method:  Discussion   Comments:    General Topics:  Medication and Side effects and symptom management   Comments:    Outcome:  Shows understanding   Comments:    Here for C23D1 Opdivo, Trastuzumab, leuc+5fu. Ankles are mildly swollen but get worse throughout the day per pt. Hands feel slightly more tingly. He is eating more. Doing physical therapy, so gets fatigued from that and some soreness. Requesting pain med refill. Feels fatigued and low energy overall.

## 2024-08-14 NOTE — PROGRESS NOTES
[facility] ANP Visit Note    Patient Name: Dontae Cortez Jr.   YOB: 1970   Medical Record Number: [id number]   Date of visit:  8/14/2024     Chief Complaint/Reason for Visit:  Chief Complaint   Patient presents with    Chemotherapy      Oncologic history:     -2018: Per report had a normal EGD and colonoscopy     -June 2022: He met with GI due to new onset dysphagia which started about 1 month prior to his visit.  He had an esophagram with a focal abrupt narrowing with irregular margins in the distal one third of the esophagus extending to the GE junction.  He also had an episode of food impaction. He has also lost about 15 lb. He was a heavy smoker from age 14 to about 40 (1.5 PPD). Also with alcohol use currently. Mother with a history of breast and colon cancer.  EGD and EUS with Dr. Yadav on 6/7/2022: Severe esophagitis with a concerning mass extending 37-39 centimeters from the incisors.  Nonobstructive mass.  By EUS uT3N1 disease.  Pathology showed invasive moderate to poorly differentiated adenocarcinoma.  HER2 amplified by FISH  .CT CAP done at [facility] on 6/16/22: Results not loaded to PACs yet.  CA 19-9 from the same day was 107.4.  CEA normal. PET/CT on 6/20/2022: Increased distal esophageal uptake with an SUV of 14.4.  Concern for shreya metastases.     -Concurrent chemoradiation with carboplatin AUC2 plus paclitaxel 50 mg/m2: July 2022-August 2022 with  down (280-->30). Post treatment PET/CT showed improvement.      -Surgery with Dr. Lu on 9/30/22: ypT1b pN0. Recovery has been complicated by an esophageal leak,      -I met with him on 12/12/22. We discussed adjuvant opdivo for 1 year. His  was elevated to 48 and repeat was 64. CT CAP was recommended.      -He was admitted on 12/25/22 for abdominal pain. He had a POEM procedure done. He was also noted to have a RLL consolidation. He was seen by pulmonary, based on imaging an outpatient PET/CT was  recommended and a biopsy of the most FDG avid lesion would be considered. Noted to have CAD and an outpatient evaluation was recommended. Outpatient PET/CT done on 1/4/23 was reviewed in tumor board and there was no focal area of concern and adjuvant immunotherapy recommended.      -Adjuvant opdivo:01/2023-03/2023. PET/CT in in 04/2023 showed uptake in the pancreatic head and tail. Also with some uptake in liver. EGD/EUS on 4/27/23 with Dr. Ritchie: Pancreatic head mass positive for adenocarcinoma: Comparison to previous esophageal cancer shows similarity but IHC in non-specific. Her 2 IHC was 2+ on this specimen but FISH was negative.      -Chemo + Herceptin + Immunotherapy: He received 7 cycles of FOLFOX + Herceptin + Immunotherapy (05/2023-09/2023). Imaging after C5 showed a favorable response. After C7, we held oxaliplatin due to neuropathy and he was started on maintenance herceptin + IO maintenance. Signatera was negative 09/11/23.     -Herceptin + IO Maintenance: He received 3 cycles from 09/25/23-10/31/23. Treatment was delayed due to hospitalizations.      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 Due to new findings-restarting FOLFOX discussed.      -admitted from 1/8/24-1/13/24 for persistent dysphagia and bronchesophageal stent. Plan is for an ASD occluder and removal of the bronchial stent as an outpatient.      -1/17/24: EGD with fistula closure with ASD occluder and removal of the bronchial stent.     -FOLFOX + Herceptin + Opdivo restarted: 3 cycles: 02/2024-03/2024     -Treatment delayed again for recurrent hospital admissions     -He underwent a Latissimus dorsi flap  reconstruction for his bronchoesophageal fistula on 5/1/24     -PET/CT 5/15/24: at least 3 areas of uptake in the right hepatic lobe, uptake in pancrease and retroperitoneum. Post-operative uptake in chest wall.      -FOLFOX + Herceptin + Nivolumab Restarted               -6/11/24:  7728               -6/24/24:  4332                  -7/15/24:  3393: infusion rxn to oxaliplatin                -7/29/24:  2973: infusion rxn to oxaliplatin   -PET/CT 8/8/24: Overall progression of metastatic disease with new gastrohepatic/peripancreatic FDG avid lymph node and interval enlargement of pancreatic and hepatic FDG avid lesions.  New mildly FDG avid left lower lobe pulmonary nodules, which may be metastatic   versus infectious/inflammatory.  Attention on follow-up is recommended.  Stable mildly FDG avid mediastinal lymph nodes.    -8/14/24:  pending    History of Present Illness:     Dontae Ortegacristina Champion is a 53 year old patient of Dr. Foster with the above oncologic history who is being treated with FOLFOX + nivolumab + trastuzumab for metastatic esophageal cancer. Presents today for APN evaluation prior to treatment.     Feeling some tingling and stiffness in his fingers. He has been participating in PT which makes him more sore. Using oxycodone with good relief. Eating more, weight stable. Lower leg swelling is present in the afternoons/evenings, improved with elevation and resolves during sleep.     Reviewed available chart notes, labs, and imaging.    History:     Past medical, surgical, social and family history reviewed with the patient and updated as appropriate in the chart.    Allergies:  Allergies   Allergen Reactions    Oxaliplatin HIVES     Shaking        Medications:    Current Outpatient Medications:     metoprolol tartrate 25 MG Oral Tab, Take 1 tablet (25 mg total) by mouth 2x Daily(Beta Blocker)., Disp: 60 tablet, Rfl: 1    pregabalin 25 MG Oral Cap, Take 1 capsule (25 mg  total) by mouth at bedtime., Disp: 30 capsule, Rfl: 1    OMEPRAZOLE 40 MG Oral Capsule Delayed Release, TAKE 1 CAPSULE BY MOUTH TWICE A DAY BEFORE MEALS, Disp: 180 capsule, Rfl: 1    furosemide 20 MG Oral Tab, Take 1 tablet (20 mg total) by mouth daily., Disp: 30 tablet, Rfl: 1    Potassium Chloride ER 20 MEQ Oral Tab CR, Take 1 tablet by mouth daily., Disp: 30 tablet, Rfl: 1    apixaban 5 MG Oral Tab, Take 1 tablet (5 mg total) by mouth 2 (two) times daily., Disp: 60 tablet, Rfl: 2    losartan 50 MG Oral Tab, Take 1 tablet (50 mg total) by mouth daily., Disp: 90 tablet, Rfl: 2    oxyCODONE 5 MG Oral Tab, Take 1 tablet (5 mg total) by mouth every 6 (six) hours as needed for Pain., Disp: 90 tablet, Rfl: 0    dicyclomine 10 MG Oral Cap, Take 1 capsule (10 mg total) by mouth 3 (three) times daily as needed., Disp: 90 capsule, Rfl: 1    PANTOPRAZOLE 40 MG Oral Tab EC, TAKE 1 TABLET BY MOUTH EVERY DAY IN THE MORNING BEFORE BREAKFAST (Patient not taking: Reported on 7/29/2024), Disp: 30 tablet, Rfl: 0    SERTRALINE 100 MG Oral Tab, TAKE 1.5 TABLETS (150MG TOTAL) BY MOUTH DAILY (Patient taking differently: Take 2 tablets (200 mg total) by mouth daily.), Disp: 135 tablet, Rfl: 1    Review of Systems:  A comprehensive 14 point review of systems was completed.  Pertinent positives and negatives noted in the HPI.    Performance Status: ECOG 1    Vital Signs:   8/14/2024  9:32 AM   Oncology Vitals    Height 6' 1.819\"    Height 188 cm    Weight 168 lb 8 oz    Weight 76.431 kg    BSA (m2) 2.02 m2    BMI 21.74 kg/m2    /71    Pulse 55    Resp 16    Temp 96.7 °F (35.9 °C)    SpO2 100 %    Pain Score 0      Physical Examination:  General: Well developed, casually dressed, appropriately groomed, alert and oriented x 3, not in acute distress.  HEENT: Anicteric, conjunctivae and sclerae clear, no oropharyngeal lesion/thrush, mucous membranes are moist  Chest: Clear to auscultation, respirations unlabored.  Heart: Regular rate and  rhythm, normal S1/2. No murmur.   Extremities: no lower extremity edema.  Neurological: Grossly intact.   Skin: Warm, Dry, No erythema, No rash  Psych/Depression: mood and affect are appropriate.     Labs:     Recent Results (from the past 72 hour(s))   CBC W Differential W Platelet    Collection Time: 08/14/24  9:11 AM   Result Value Ref Range    WBC 2.6 (L) 4.0 - 11.0 x10(3) uL    RBC 3.56 (L) 4.30 - 5.70 x10(6)uL    HGB 10.6 (L) 13.0 - 17.5 g/dL    HCT 33.4 (L) 39.0 - 53.0 %    .0 150.0 - 450.0 10(3)uL    MCV 93.8 80.0 - 100.0 fL    MCH 29.8 26.0 - 34.0 pg    MCHC 31.7 31.0 - 37.0 g/dL    RDW 15.3 %    Neutrophil Absolute Prelim 2.06 1.50 - 7.70 x10 (3) uL    Neutrophil Absolute 2.06 1.50 - 7.70 x10(3) uL    Lymphocyte Absolute 0.39 (L) 1.00 - 4.00 x10(3) uL    Monocyte Absolute 0.08 (L) 0.10 - 1.00 x10(3) uL    Eosinophil Absolute 0.00 0.00 - 0.70 x10(3) uL    Basophil Absolute 0.00 0.00 - 0.20 x10(3) uL    Immature Granulocyte Absolute 0.02 0.00 - 1.00 x10(3) uL    Neutrophil % 80.8 %    Lymphocyte % 15.3 %    Monocyte % 3.1 %    Eosinophil % 0.0 %    Basophil % 0.0 %    Immature Granulocyte % 0.8 %   Comp Metabolic Panel (14)    Collection Time: 08/14/24  9:11 AM   Result Value Ref Range    Glucose 294 (H) 70 - 99 mg/dL    Sodium 139 136 - 145 mmol/L    Potassium 3.3 (L) 3.5 - 5.1 mmol/L    Chloride 106 98 - 112 mmol/L    CO2 24.0 21.0 - 32.0 mmol/L    Anion Gap 9 0 - 18 mmol/L    BUN 10 9 - 23 mg/dL    Creatinine 0.73 0.70 - 1.30 mg/dL    Calcium, Total 8.4 (L) 8.5 - 10.1 mg/dL    Calculated Osmolality 298 (H) 275 - 295 mOsm/kg    eGFR-Cr 108 >=60 mL/min/1.73m2    AST 37 15 - 37 U/L    ALT 79 (H) 16 - 61 U/L    Alkaline Phosphatase 128 (H) 45 - 117 U/L    Bilirubin, Total 0.3 0.1 - 2.0 mg/dL    Total Protein 6.0 (L) 6.4 - 8.2 g/dL    Albumin 2.5 (L) 3.4 - 5.0 g/dL    Globulin  3.5 2.8 - 4.4 g/dL    A/G Ratio 0.7 (L) 1.0 - 2.0    Patient Fasting for CMP? Patient not present      Radiology:    PET  STANDARD BODY SCAN (ONCOLOGY) (CPT=78815)    Result Date: 8/8/2024  CONCLUSION:  1. Overall progression of metastatic disease with new gastrohepatic/peripancreatic FDG avid lymph node and interval enlargement of pancreatic and hepatic FDG avid lesions.  New mildly FDG avid left lower lobe pulmonary nodules, which may be metastatic versus infectious/inflammatory.  Attention on follow-up is recommended.  Stable mildly FDG avid mediastinal lymph nodes. 2. Stable aneurysm of the ascending aorta measuring 4.3 centimeters.   LOCATION:  [facility]    Dictated by (CST): Shar Simms MD on 8/08/2024 at 10:24 AM     Finalized by (CST): Shar Simms MD on 8/08/2024 at 10:49 AM          Impression/Plan    Metastatic esophageal cancer: restarted FOLFOX + trastuzumab + nivo on 6/11/24 after multiple delays for recurrent hospitalizations and surgeries. Tumor markers have been downtrending since resuming therapy. PET done recently was compared to previous PET done 1 month prior to resuming therapy. Plan to continue with current regimen, holding oxaliplatin as outlined below, and continue to trend tumor markers.    Oxaliplatin infusion related reaction: manifests as hives, itching, and GI upset. Worsening despite optimal premedication (PO dex + montelukast evening before and morning of treatment with IV solumedrol + IV dex + IV famotidine + IV diphenhydramine). Because he has not been able to complete the last 2 doses, will omit moving forward.     PE + RLE DVT: on apixaban indefinitely, no bleeding/bruising noted.     BLE swelling: improved with furosemide, refill today. Start potassium supplementation as well.     Hypokalemia: likely related to diuretic use, mild. Will have him double up on K replacement today at home.     Cancer related pain: follows with palliative care.     Planned Follow Up: 2 weeks for MD + labs + chemo    Risk Level: HIGH - metastatic gastric cancer receiving chemotherapy + immunotherapy with systemic  effects requiring intervention and close monitoring     The 21st Century Cures Act makes medical notes like these available to patients in the interest of transparency. Please be advised this is a medical document. Medical documents are intended to carry relevant information, facts as evident, and the clinical opinion of the practitioner. The medical note is intended as peer to peer communication and may appear blunt or direct. It is written in medical language and may contain abbreviations or verbiage that are unfamiliar.     Electronically Signed by:    Grecia Angelo DNP, NP-C  Nurse Practitioner  [facility]

## 2024-08-15 ENCOUNTER — TELEPHONE (OUTPATIENT)
Dept: HEMATOLOGY/ONCOLOGY | Facility: HOSPITAL | Age: 54
End: 2024-08-15

## 2024-08-15 DIAGNOSIS — C15.9 MALIGNANT NEOPLASM OF ESOPHAGUS, UNSPECIFIED LOCATION (HCC): Primary | ICD-10-CM

## 2024-08-15 NOTE — TELEPHONE ENCOUNTER
Telephone:    Discussed rise in  and PET/CT results. Liver biopsy ordered. Will need to reassess HER2 status.

## 2024-08-18 DIAGNOSIS — F33.1 MAJOR DEPRESSIVE DISORDER, RECURRENT, MODERATE (HCC): ICD-10-CM

## 2024-08-19 VITALS — WEIGHT: 168 LBS | BODY MASS INDEX: 22.26 KG/M2 | HEIGHT: 73 IN

## 2024-08-19 RX ORDER — SERTRALINE HYDROCHLORIDE 100 MG/1
100 TABLET, FILM COATED ORAL DAILY
Qty: 90 TABLET | Refills: 1 | OUTPATIENT
Start: 2024-08-19

## 2024-08-27 ENCOUNTER — APPOINTMENT (OUTPATIENT)
Dept: HEMATOLOGY/ONCOLOGY | Age: 54
End: 2024-08-27
Attending: INTERNAL MEDICINE
Payer: COMMERCIAL

## 2024-08-28 RX ORDER — SODIUM CHLORIDE 9 MG/ML
INJECTION, SOLUTION INTRAVENOUS CONTINUOUS
Status: CANCELLED | OUTPATIENT
Start: 2024-08-28

## 2024-08-28 RX ORDER — NALOXONE HYDROCHLORIDE 0.4 MG/ML
0.08 INJECTION, SOLUTION INTRAMUSCULAR; INTRAVENOUS; SUBCUTANEOUS AS NEEDED
Status: CANCELLED | OUTPATIENT
Start: 2024-08-28

## 2024-08-28 RX ORDER — MIDAZOLAM HYDROCHLORIDE 1 MG/ML
1 INJECTION INTRAMUSCULAR; INTRAVENOUS EVERY 5 MIN PRN
Status: CANCELLED | OUTPATIENT
Start: 2024-08-29 | End: 2024-08-29

## 2024-08-28 RX ORDER — FLUMAZENIL 0.1 MG/ML
0.2 INJECTION INTRAVENOUS AS NEEDED
Status: CANCELLED | OUTPATIENT
Start: 2024-08-28

## 2024-08-29 ENCOUNTER — HOSPITAL ENCOUNTER (OUTPATIENT)
Dept: GENERAL RADIOLOGY | Facility: HOSPITAL | Age: 54
Discharge: HOME | End: 2024-08-29
Attending: INTERNAL MEDICINE
Payer: COMMERCIAL

## 2024-08-29 ENCOUNTER — HOSPITAL ENCOUNTER (OUTPATIENT)
Dept: CT IMAGING | Facility: HOSPITAL | Age: 54
Discharge: HOME | End: 2024-08-29
Attending: INTERNAL MEDICINE
Payer: COMMERCIAL

## 2024-08-29 DIAGNOSIS — C15.9 MALIGNANT NEOPLASM OF ESOPHAGUS, UNSPECIFIED LOCATION (HCC): Primary | ICD-10-CM

## 2024-08-29 DIAGNOSIS — C15.9 MALIGNANT NEOPLASM OF ESOPHAGUS, UNSPECIFIED LOCATION (HCC): ICD-10-CM

## 2024-08-29 LAB
INR BLD: 1.35 (ref 0.8–1.2)
PROTHROMBIN TIME: 16.8 SECONDS (ref 11.6–14.8)

## 2024-08-29 PROCEDURE — 85610 PROTHROMBIN TIME: CPT | Performed by: RADIOLOGY

## 2024-08-29 NOTE — IMAGING NOTE
Patient arrived to radiology holding. PORT accessed as per LDA documentation. Patient took eliquis this morning. Dr. Lombardi notified. Procedure cancelled.

## 2024-09-03 ENCOUNTER — APPOINTMENT (OUTPATIENT)
Dept: HEMATOLOGY/ONCOLOGY | Age: 54
End: 2024-09-03
Attending: INTERNAL MEDICINE
Payer: COMMERCIAL

## 2024-09-03 ENCOUNTER — PATIENT MESSAGE (OUTPATIENT)
Dept: HEMATOLOGY/ONCOLOGY | Age: 54
End: 2024-09-03

## 2024-09-03 RX ORDER — ONDANSETRON 8 MG/1
8 TABLET, ORALLY DISINTEGRATING ORAL EVERY 8 HOURS PRN
Qty: 30 TABLET | Refills: 0 | Status: SHIPPED | OUTPATIENT
Start: 2024-09-03

## 2024-09-03 RX ORDER — ONDANSETRON 8 MG/1
8 TABLET, FILM COATED ORAL EVERY 8 HOURS PRN
Qty: 30 TABLET | Refills: 3 | Status: SHIPPED | OUTPATIENT
Start: 2024-09-03 | End: 2024-09-03

## 2024-09-03 NOTE — TELEPHONE ENCOUNTER
From: Dontae Cortez Jr.  To: NATHANIEL BOWDEN  Sent: 9/3/2024 8:25 AM CDT  Subject: Dontae Cortez     I'm wondering if you can add some medication to help my stomach issues. I have pain and nausea with lots of burping. Im gagging and my stomach is always gurgling when eating or not eating. It's making me not want to eat or drink. I got some omeprazole over the counter and didn't help. I'm in bed more with this too

## 2024-09-03 NOTE — TELEPHONE ENCOUNTER
Spoke with patient regarding his symptoms.  He feels like he has heartburn causing gagging and gurgling.  Its affecting his appetite.  He denies nausea or vomiting.    He has zofran which he will try today.  He also was prescribed omeprazole 40mg BID by GI but he hasn't been using this.    Encouraged him to start using omeprazole and trial zofran for his stomach complaints.    He is also using 3 - 4 doses of oxycodone for stomach pain and often uses the additional 4th dose when he has PT.  He feels this is working well.    Refilled oxycodone and sent to [facility]

## 2024-09-05 RX ORDER — METOPROLOL TARTRATE 25 MG/1
25 TABLET, FILM COATED ORAL
Qty: 60 TABLET | Refills: 1 | Status: SHIPPED | OUTPATIENT
Start: 2024-09-05

## 2024-09-06 ENCOUNTER — TELEPHONE (OUTPATIENT)
Dept: HEMATOLOGY/ONCOLOGY | Facility: HOSPITAL | Age: 54
End: 2024-09-06

## 2024-09-06 NOTE — TELEPHONE ENCOUNTER
Family Medical Leave Act received via [employer]. Sent [employer] message for Release of Information / Form Completion Request completion

## 2024-09-09 ENCOUNTER — APPOINTMENT (OUTPATIENT)
Dept: HEMATOLOGY/ONCOLOGY | Age: 54
End: 2024-09-09
Attending: INTERNAL MEDICINE
Payer: COMMERCIAL

## 2024-09-10 ENCOUNTER — HOSPITAL ENCOUNTER (OUTPATIENT)
Dept: GENERAL RADIOLOGY | Facility: HOSPITAL | Age: 54
Discharge: HOME | End: 2024-09-10
Attending: INTERNAL MEDICINE
Payer: COMMERCIAL

## 2024-09-10 ENCOUNTER — NURSE ONLY (OUTPATIENT)
Dept: LAB | Facility: HOSPITAL | Age: 54
End: 2024-09-10
Attending: INTERNAL MEDICINE
Payer: COMMERCIAL

## 2024-09-10 ENCOUNTER — HOSPITAL ENCOUNTER (OUTPATIENT)
Dept: CT IMAGING | Facility: HOSPITAL | Age: 54
Discharge: HOME | End: 2024-09-10
Attending: INTERNAL MEDICINE
Payer: COMMERCIAL

## 2024-09-10 VITALS
OXYGEN SATURATION: 96 % | RESPIRATION RATE: 13 BRPM | SYSTOLIC BLOOD PRESSURE: 131 MMHG | DIASTOLIC BLOOD PRESSURE: 74 MMHG | TEMPERATURE: 98 F | WEIGHT: 168 LBS | HEIGHT: 73 IN | BODY MASS INDEX: 22.26 KG/M2 | HEART RATE: 73 BPM

## 2024-09-10 DIAGNOSIS — C15.9 ESOPHAGEAL CANCER (HCC): Primary | ICD-10-CM

## 2024-09-10 PROCEDURE — 47000 NEEDLE BIOPSY OF LIVER PERQ: CPT | Performed by: INTERNAL MEDICINE

## 2024-09-10 PROCEDURE — 99152 MOD SED SAME PHYS/QHP 5/>YRS: CPT | Performed by: INTERNAL MEDICINE

## 2024-09-10 PROCEDURE — 88341 IMHCHEM/IMCYTCHM EA ADD ANTB: CPT | Performed by: INTERNAL MEDICINE

## 2024-09-10 PROCEDURE — 88307 TISSUE EXAM BY PATHOLOGIST: CPT | Performed by: INTERNAL MEDICINE

## 2024-09-10 PROCEDURE — 77012 CT SCAN FOR NEEDLE BIOPSY: CPT | Performed by: INTERNAL MEDICINE

## 2024-09-10 PROCEDURE — 88360 TUMOR IMMUNOHISTOCHEM/MANUAL: CPT | Performed by: INTERNAL MEDICINE

## 2024-09-10 PROCEDURE — 88342 IMHCHEM/IMCYTCHM 1ST ANTB: CPT | Performed by: INTERNAL MEDICINE

## 2024-09-10 PROCEDURE — 99153 MOD SED SAME PHYS/QHP EA: CPT | Performed by: INTERNAL MEDICINE

## 2024-09-10 RX ORDER — NALOXONE HYDROCHLORIDE 0.4 MG/ML
0.08 INJECTION, SOLUTION INTRAMUSCULAR; INTRAVENOUS; SUBCUTANEOUS AS NEEDED
Status: DISCONTINUED | OUTPATIENT
Start: 2024-09-10 | End: 2024-09-12

## 2024-09-10 RX ORDER — FLUMAZENIL 0.1 MG/ML
0.2 INJECTION INTRAVENOUS AS NEEDED
Status: DISCONTINUED | OUTPATIENT
Start: 2024-09-10 | End: 2024-09-12

## 2024-09-10 RX ORDER — MIDAZOLAM HYDROCHLORIDE 1 MG/ML
INJECTION INTRAMUSCULAR; INTRAVENOUS
Status: COMPLETED
Start: 2024-09-10 | End: 2024-09-10

## 2024-09-10 RX ORDER — SODIUM CHLORIDE 9 MG/ML
INJECTION, SOLUTION INTRAVENOUS CONTINUOUS
Status: DISCONTINUED | OUTPATIENT
Start: 2024-09-10 | End: 2024-09-12

## 2024-09-10 RX ORDER — MIDAZOLAM HYDROCHLORIDE 1 MG/ML
1 INJECTION INTRAMUSCULAR; INTRAVENOUS EVERY 5 MIN PRN
Status: ACTIVE | OUTPATIENT
Start: 2024-09-10 | End: 2024-09-10

## 2024-09-10 RX ADMIN — MIDAZOLAM HYDROCHLORIDE 1 MG: 1 INJECTION INTRAMUSCULAR; INTRAVENOUS at 10:59:00

## 2024-09-10 RX ADMIN — MIDAZOLAM HYDROCHLORIDE 1 MG: 1 INJECTION INTRAMUSCULAR; INTRAVENOUS at 11:04:00

## 2024-09-10 RX ADMIN — MIDAZOLAM HYDROCHLORIDE 1 MG: 1 INJECTION INTRAMUSCULAR; INTRAVENOUS at 10:53:00

## 2024-09-10 RX ADMIN — SODIUM CHLORIDE: 9 INJECTION, SOLUTION INTRAVENOUS at 10:39:00

## 2024-09-10 NOTE — DISCHARGE INSTRUCTIONS
Discharge/After Visit [facility] Department of Radiology  Biopsy - Liver    Post Sedation  Follow these guidelines:  You should be watched overnight by a responsible adult. This person should make sure your condition remains stable.   Do not drink any alcohol for 24 hours after the procedure.  Don’t drive, operate dangerous machinery, make important business or personal decisions, or sign legal documents for 24 hours after the procedure.  Note: Your healthcare provider may tell you not to take any medicine by mouth for pain or sleep for a period of time. These medicines may react with the medicine you were given during your procedure. This could cause a much stronger response than usual.     Activity:  Take it easy for the rest of the day after your biopsy.   You may be sore at the site of the biopsy for the next 5 days.   Do not do any strenuous exercises or lift over five pounds for the next 24 hours.     Biopsy Site:  Keep a bandage on the biopsy site for 24 hours after the procedure.   You may shower after 24 hours, but no soaking in a bathtub for 48 hours.     Liver Biopsy: If there is any increase in right-sided back, shoulder, or abdominal pain, go to the nearest Emergency Room. Call your doctor for unusual dizziness or weakness.     Diet: Drink plenty of fluids and resume your usual home diet. A slow return to normal foods minimizes nausea.    Pain Management:   You may use over-the-counter or prescribed pain relief medication if it is not contraindicated by another condition.     Medications:  You may resume your usual home medications. You may resume blood thinners 24 hours after the procedure if there is no bleeding from/hematoma at the puncture site or bloody urine (Renal Biopsy only)     When to seek medical advice:  Call the provider that ordered the biopsy with any questions and to discuss test results. These may also be available in your [facility] My Chart. You may also  contact the Radiology Nurse at [phone], M-F, 8-5 with any additional questions or concerns.  Dial [phone] and ask the  to page the on-call IR Radiologist if any of these occur:    A change in color or temperature of the area where the biopsy was performed.  You develop increasing pain or shortness of breath.  Unusual drowsiness, weakness, or dizziness.  Unusual vomiting.     IF YOU FEEL YOU ARE EXPERIENCING AN EMERGENCY,   CALL 911 OR GO TO THE NEAREST EMERGENCY ROOM      4.2.24 MO/  Radiology

## 2024-09-10 NOTE — PROCEDURES
[facility]    Dontae Buddy Diego Champion Patient Status:  Outpatient    10/15/1970 MRN [id number]   Location [facility] CT Attending Senia Foster MD   Hosp Day # 0 PCP Adrian Horowitz MD         Brief Procedure Report    Pre-Operative Diagnosis: Liver Masses Esophageal cancer    Post-Operative Diagnosis: Same as above.    Procedure Performed: CTguided needle core biopsy of liver mass    Anesthesia: 1% lidocaine    EBL: 1cc    Complications: None    Summary of Case: 18g Needle core biopsy sample x 6 of right lobe of liver mass obtained without immediate complication.  Full report to follow in PACS.     Cuong Wan

## 2024-09-10 NOTE — TELEPHONE ENCOUNTER
Called patient to obtain details- Per patient was never asked this before all information is the same as always- Checked patient chart and saw these forms are re certification for patient spouse will reflect information from last years forms - Advised patient this and will keep him updated for any other questions - verbal understanding

## 2024-09-10 NOTE — IMAGING NOTE
Ronen is here for CT guided liver biopsy. Port access established and 0.9 NS IV initiated to maintain patency.    Informed consent obtained by Dr Wan. Patient positioned on scanner. Biopsy obtained. Specimen sent to Pathology.      Presently denies pain. Tolerated procedure well. Please see flowsheets for vital signs and/or additional information.    Transported pt to [id number] accompanied by Home SAENZ and Roxanne Luis. Bedside report given to Ana Luisa Angeline-Op DANY. Biopsy site/ drsg verified C/D/I.

## 2024-09-10 NOTE — PROCEDURES
I have discussed with the patient and/or legal representative the potential benefits, risks, and side effects of this procedure, the likelihood of the patient achieving goals; and the potential problems that might occur during recuperation.  I discussed reasonable alternatives to the procedure, including risks, benefits and side effects related to the alternatives, and risks related to not receiving this procedure.

## 2024-09-10 NOTE — H&P
[facility]  History & Physical    [facility]    Dontae Cortez Jr. Patient Status:  Outpatient    10/15/1970 MRN [id number]   Location [facility] CT Attending Senia Foster MD   Hosp Day # 0 PCP Adrian Horowitz MD         Admitting Diagnosis:  Esophageal cancer    History of Present Illness:  Esophageal cancer with recent PET CT showing progression of disease with suspected metastatic liver lesions.  Request for CT guided liver biopsy.     Past Medical History:  Past Medical History:    Back problem    Belching    Black stools    Borderline diabetes    Dx in 2013 - HgA1C 6.2%    C. difficile diarrhea    pt treated and without symptoms    Chest pain    Coronary artery disease    On 13: CABG x 4 with LIMA to LAD and SVG to diagonal, OM and PDA    Decorative tattoo    Depression    Difficult intubation    h/o esophagectomy for CA; developed esophagobronchial vistula    Disorder of liver    LIVER CA    Esophageal cancer (HCC)    completed chemo    Esophageal reflux    Essential hypertension    Exposure to medical diagnostic radiation    last tx 2022    Frequent urination    Gastroparesis    Heartburn    High blood pressure    High cholesterol    Found when I had quadruple bypass    History of COVID-19    asymptomatic - pt was dx during a hospitalization for another diagnosis. No continued symptoms    History of stomach ulcers    Hyperlipidemia    Hyperlipidemia LDL goal < 70    Indigestion    Morbid obesity with BMI of 40.0-44.9, adult (HCC)    Muscle weakness    Nausea vomiting and diarrhea    Nontoxic multinodular goiter    Dx in 2013: pt was told that imaging showed thyroid cysts per PCP    Pancreatic cancer (HCC)    last dose 2023 is scheduled for another round 23    Peripheral vascular disease (HCC)    pt denies    Personal history of antineoplastic chemotherapy    for esophageal cancer/completed    Personal history of antineoplastic chemotherapy    pancreatic cancer     Personal history of antineoplastic chemotherapy    Last treatment 3/12/24    Problems with swallowing    Pulmonary nodules    Dx in 8/2013: CT chest showed small bilateral fissural-based lung nodules less than 1 cm    S/P CABG x 4    On 8/16/13: CABG x 4 with LIMA to LAD and SVG to diagonal, OM and PDA    Shortness of breath    when coughing; no oxygen    Vomiting           Past Surgical History:    Past Surgical History:   Procedure Laterality Date    Appendectomy      Appendectomy      Arthroscopy of joint unlisted      right shoulder    Cabg      On 8/16/13: CABG x 4 with LIMA to LAD and SVG to diagonal, OM and PDA    Cardiac cath lab      On 8/14/2013: cardiac cath showed 3-vessel disease    Other surgical history      1.       Laparoscopic robotic-assisted esophagogastrectomy.    Port, indwelling, imp         Allergies:  Allergies   Allergen Reactions    Oxaliplatin HIVES     Shaking        Medications:      Current Outpatient Medications:     pantoprazole 40 MG Oral Tab EC, Take 1 tablet (40 mg total) by mouth before breakfast., Disp: 30 tablet, Rfl: 0    metoprolol tartrate 25 MG Oral Tab, Take 1 tablet (25 mg total) by mouth 2x Daily(Beta Blocker)., Disp: 60 tablet, Rfl: 1    oxyCODONE 5 MG Oral Tab, Take 1 tablet (5 mg total) by mouth every 4 (four) hours as needed for Pain., Disp: 90 tablet, Rfl: 0    ondansetron 8 MG Oral Tablet Dispersible, Take 1 tablet (8 mg total) by mouth every 8 (eight) hours as needed for Nausea., Disp: 30 tablet, Rfl: 0    BENZONATATE 100 MG Oral Cap, TAKE 1 CAPSULE BY MOUTH THREE TIMES A DAY AS NEEDED FOR COUGH, Disp: 90 capsule, Rfl: 0    dicyclomine 10 MG Oral Cap, Take 1 capsule (10 mg total) by mouth 3 (three) times daily as needed., Disp: 90 capsule, Rfl: 1    pregabalin 25 MG Oral Cap, Take 1 capsule (25 mg total) by mouth at bedtime., Disp: 30 capsule, Rfl: 1    OMEPRAZOLE 40 MG Oral Capsule Delayed Release, TAKE 1 CAPSULE BY MOUTH TWICE A DAY BEFORE MEALS, Disp: 180  capsule, Rfl: 1    furosemide 20 MG Oral Tab, Take 1 tablet (20 mg total) by mouth daily., Disp: 30 tablet, Rfl: 1    Potassium Chloride ER 20 MEQ Oral Tab CR, Take 1 tablet by mouth daily., Disp: 30 tablet, Rfl: 1    apixaban 5 MG Oral Tab, Take 1 tablet (5 mg total) by mouth 2 (two) times daily., Disp: 60 tablet, Rfl: 2    SERTRALINE 100 MG Oral Tab, TAKE 1.5 TABLETS (150MG TOTAL) BY MOUTH DAILY (Patient taking differently: Take 2 tablets (200 mg total) by mouth daily.), Disp: 135 tablet, Rfl: 1    losartan 50 MG Oral Tab, Take 1 tablet (50 mg total) by mouth daily., Disp: 90 tablet, Rfl: 2  Social History:  Social History     Tobacco Use    Smoking status: Former     Current packs/day: 0.00     Average packs/day: 1 pack/day for 27.0 years (27.0 ttl pk-yrs)     Types: Cigarettes     Start date: 8/15/1986     Quit date: 8/15/2013     Years since quittin.0    Smokeless tobacco: Never    Tobacco comments:     Quit smoking 2013   Substance Use Topics    Alcohol use: Never        Family History:  Family History   Problem Relation Age of Onset    Cancer Mother         breast and colon     Diabetes Neg          Physical Exam:    BP (!) 131/92 (BP Location: Left arm)   Pulse 71   Temp 98.2 °F (36.8 °C) (Temporal)   Resp 13   Ht 73\"   Wt 168 lb   SpO2 97%   BMI 22.16 kg/m²     General: NAD  Neck: No JVD  Lungs: CTA bilat  Heart: RRR, S1, S2  Abdomen: Soft, NT/ND, BS+  Extremities: Warm, dry, no LE edema bilat      Labs:  No results for input(s): \"RBC\", \"HGB\", \"HCT\", \"MCV\", \"MCH\", \"MCHC\", \"RDW\", \"NEPRELIM\", \"WBC\", \"PLT\" in the last 168 hours.  Recent Labs   Lab 09/10/24  0921   PTP 15.0*   INR 1.17     No results for input(s): \"GLU\", \"BUN\", \"CREATSERUM\", \"GFRAA\", \"GFRNAA\", \"CA\", \"NA\", \"K\", \"CL\", \"CO2\" in the last 168 hours.    Impression: Metastatic esophageal cancer      Recommendations: CT guided liver biopsy.     Cuong Wan MD

## 2024-09-12 DIAGNOSIS — C15.9 MALIGNANT NEOPLASM OF ESOPHAGUS, UNSPECIFIED LOCATION (HCC): Primary | ICD-10-CM

## 2024-09-14 NOTE — TELEPHONE ENCOUNTER
Dr. Foster      Please sign off on form if you agree to: FMLA  Re certification  (place your signature on the first page only)    -From your Inbasket, Highlight the patient and click Chart   -Double click the 09/06/24 Forms Completion telephone encounter  -Scroll down to the Media section   -Click the blue Hyperlink: Scan on 09/06/24  -Click Acknowledge located in the top right ribbon/menu   -Drag the mouse into the blank space of the document and a + sign will appear. Left click to   electronically sign the document.     Thank you,     Estrellita

## 2024-09-15 ENCOUNTER — APPOINTMENT (OUTPATIENT)
Dept: CT IMAGING | Facility: HOSPITAL | Age: 54
End: 2024-09-15
Attending: EMERGENCY MEDICINE
Payer: COMMERCIAL

## 2024-09-15 ENCOUNTER — HOSPITAL ENCOUNTER (INPATIENT)
Facility: HOSPITAL | Age: 54
LOS: 5 days | Discharge: HOME | End: 2024-09-20
Attending: EMERGENCY MEDICINE | Admitting: INTERNAL MEDICINE
Payer: COMMERCIAL

## 2024-09-15 DIAGNOSIS — I10 PRIMARY HYPERTENSION: ICD-10-CM

## 2024-09-15 DIAGNOSIS — R74.01 TRANSAMINITIS: ICD-10-CM

## 2024-09-15 DIAGNOSIS — E80.6 HYPERBILIRUBINEMIA: ICD-10-CM

## 2024-09-15 DIAGNOSIS — Z51.5 PALLIATIVE CARE ENCOUNTER: ICD-10-CM

## 2024-09-15 DIAGNOSIS — C15.5 MALIGNANT NEOPLASM OF LOWER THIRD OF ESOPHAGUS (HCC): ICD-10-CM

## 2024-09-15 DIAGNOSIS — K83.1 BILIARY OBSTRUCTION (HCC): ICD-10-CM

## 2024-09-15 DIAGNOSIS — C78.7 MALIGNANT NEOPLASM METASTATIC TO LIVER (HCC): ICD-10-CM

## 2024-09-15 DIAGNOSIS — F33.1 MAJOR DEPRESSIVE DISORDER, RECURRENT, MODERATE (HCC): ICD-10-CM

## 2024-09-15 DIAGNOSIS — T85.528A: ICD-10-CM

## 2024-09-15 DIAGNOSIS — G89.3 CANCER ASSOCIATED PAIN: ICD-10-CM

## 2024-09-15 DIAGNOSIS — G89.3 NEOPLASM RELATED PAIN: ICD-10-CM

## 2024-09-15 DIAGNOSIS — C15.9 ESOPHAGEAL ADENOCARCINOMA (HCC): Primary | ICD-10-CM

## 2024-09-15 DIAGNOSIS — G89.3 CANCER RELATED PAIN: ICD-10-CM

## 2024-09-15 LAB
ALBUMIN SERPL-MCNC: 3.3 G/DL (ref 3.2–4.8)
ALBUMIN/GLOB SERPL: 1.2 {RATIO} (ref 1–2)
ALP LIVER SERPL-CCNC: 498 U/L
ALT SERPL-CCNC: 423 U/L
ANION GAP SERPL CALC-SCNC: 4 MMOL/L (ref 0–18)
AST SERPL-CCNC: 250 U/L (ref ?–34)
BASOPHILS # BLD AUTO: 0.07 X10(3) UL (ref 0–0.2)
BASOPHILS NFR BLD AUTO: 1.1 %
BILIRUB SERPL-MCNC: 6.7 MG/DL (ref 0.3–1.2)
BILIRUB UR QL CFM: POSITIVE
BUN BLD-MCNC: 9 MG/DL (ref 9–23)
CALCIUM BLD-MCNC: 8.9 MG/DL (ref 8.7–10.4)
CHLORIDE SERPL-SCNC: 103 MMOL/L (ref 98–112)
CLARITY UR REFRACT.AUTO: CLEAR
CO2 SERPL-SCNC: 29 MMOL/L (ref 21–32)
CREAT BLD-MCNC: 0.64 MG/DL
EGFRCR SERPLBLD CKD-EPI 2021: 112 ML/MIN/1.73M2 (ref 60–?)
EOSINOPHIL # BLD AUTO: 0.07 X10(3) UL (ref 0–0.7)
EOSINOPHIL NFR BLD AUTO: 1.1 %
ERYTHROCYTE [DISTWIDTH] IN BLOOD BY AUTOMATED COUNT: 16.2 %
GLOBULIN PLAS-MCNC: 2.7 G/DL (ref 2–3.5)
GLUCOSE BLD-MCNC: 145 MG/DL (ref 70–99)
GLUCOSE UR STRIP.AUTO-MCNC: NORMAL MG/DL
HCT VFR BLD AUTO: 36.6 %
HGB BLD-MCNC: 11.9 G/DL
IMM GRANULOCYTES # BLD AUTO: 0.14 X10(3) UL (ref 0–1)
IMM GRANULOCYTES NFR BLD: 2.2 %
KETONES UR STRIP.AUTO-MCNC: NEGATIVE MG/DL
LEUKOCYTE ESTERASE UR QL STRIP.AUTO: NEGATIVE
LIPASE SERPL-CCNC: 19 U/L (ref 12–53)
LYMPHOCYTES # BLD AUTO: 0.69 X10(3) UL (ref 1–4)
LYMPHOCYTES NFR BLD AUTO: 10.7 %
MCH RBC QN AUTO: 30.1 PG (ref 26–34)
MCHC RBC AUTO-ENTMCNC: 32.5 G/DL (ref 31–37)
MCV RBC AUTO: 92.7 FL
MONOCYTES # BLD AUTO: 0.79 X10(3) UL (ref 0.1–1)
MONOCYTES NFR BLD AUTO: 12.3 %
NEUTROPHILS # BLD AUTO: 4.66 X10 (3) UL (ref 1.5–7.7)
NEUTROPHILS # BLD AUTO: 4.66 X10(3) UL (ref 1.5–7.7)
NEUTROPHILS NFR BLD AUTO: 72.6 %
NITRITE UR QL STRIP.AUTO: NEGATIVE
OSMOLALITY SERPL CALC.SUM OF ELEC: 283 MOSM/KG (ref 275–295)
PH UR STRIP.AUTO: 6.5 [PH] (ref 5–8)
PLATELET # BLD AUTO: 205 10(3)UL (ref 150–450)
POTASSIUM SERPL-SCNC: 4.1 MMOL/L (ref 3.5–5.1)
PROT SERPL-MCNC: 6 G/DL (ref 5.7–8.2)
PROT UR STRIP.AUTO-MCNC: NEGATIVE MG/DL
RBC # BLD AUTO: 3.95 X10(6)UL
RBC UR QL AUTO: NEGATIVE
SODIUM SERPL-SCNC: 136 MMOL/L (ref 136–145)
SP GR UR STRIP.AUTO: >1.03 (ref 1–1.03)
UROBILINOGEN UR STRIP.AUTO-MCNC: NORMAL MG/DL
WBC # BLD AUTO: 6.4 X10(3) UL (ref 4–11)

## 2024-09-15 PROCEDURE — 71275 CT ANGIOGRAPHY CHEST: CPT | Performed by: EMERGENCY MEDICINE

## 2024-09-15 PROCEDURE — 99223 1ST HOSP IP/OBS HIGH 75: CPT | Performed by: HOSPITALIST

## 2024-09-15 PROCEDURE — 74177 CT ABD & PELVIS W/CONTRAST: CPT | Performed by: EMERGENCY MEDICINE

## 2024-09-15 RX ORDER — DICYCLOMINE HYDROCHLORIDE 10 MG/1
10 CAPSULE ORAL EVERY 6 HOURS PRN
Status: DISCONTINUED | OUTPATIENT
Start: 2024-09-15 | End: 2024-09-20

## 2024-09-15 RX ORDER — PROCHLORPERAZINE MALEATE 10 MG
10 TABLET ORAL EVERY 6 HOURS PRN
Status: ON HOLD | COMMUNITY
Start: 2024-08-18

## 2024-09-15 RX ORDER — PREGABALIN 25 MG/1
25 CAPSULE ORAL NIGHTLY
Status: DISCONTINUED | OUTPATIENT
Start: 2024-09-15 | End: 2024-09-18

## 2024-09-15 RX ORDER — HYDROMORPHONE HYDROCHLORIDE 1 MG/ML
INJECTION, SOLUTION INTRAMUSCULAR; INTRAVENOUS; SUBCUTANEOUS
Status: COMPLETED
Start: 2024-09-15 | End: 2024-09-15

## 2024-09-15 RX ORDER — HYDROMORPHONE HYDROCHLORIDE 1 MG/ML
0.5 INJECTION, SOLUTION INTRAMUSCULAR; INTRAVENOUS; SUBCUTANEOUS EVERY 30 MIN PRN
Status: COMPLETED | OUTPATIENT
Start: 2024-09-15 | End: 2024-09-15

## 2024-09-15 RX ORDER — BISACODYL 10 MG
10 SUPPOSITORY, RECTAL RECTAL
Status: DISCONTINUED | OUTPATIENT
Start: 2024-09-15 | End: 2024-09-20

## 2024-09-15 RX ORDER — PANTOPRAZOLE SODIUM 40 MG/1
40 TABLET, DELAYED RELEASE ORAL
Status: DISCONTINUED | OUTPATIENT
Start: 2024-09-16 | End: 2024-09-20

## 2024-09-15 RX ORDER — SERTRALINE HYDROCHLORIDE 100 MG/1
200 TABLET, FILM COATED ORAL DAILY
Status: DISCONTINUED | OUTPATIENT
Start: 2024-09-16 | End: 2024-09-20

## 2024-09-15 RX ORDER — HYDROMORPHONE HYDROCHLORIDE 1 MG/ML
0.8 INJECTION, SOLUTION INTRAMUSCULAR; INTRAVENOUS; SUBCUTANEOUS EVERY 2 HOUR PRN
Status: DISCONTINUED | OUTPATIENT
Start: 2024-09-15 | End: 2024-09-18

## 2024-09-15 RX ORDER — SENNOSIDES 8.6 MG
17.2 TABLET ORAL NIGHTLY PRN
Status: DISCONTINUED | OUTPATIENT
Start: 2024-09-15 | End: 2024-09-20

## 2024-09-15 RX ORDER — FUROSEMIDE 20 MG
20 TABLET ORAL DAILY
Status: DISCONTINUED | OUTPATIENT
Start: 2024-09-16 | End: 2024-09-20

## 2024-09-15 RX ORDER — OXYCODONE HYDROCHLORIDE 5 MG/1
5 TABLET ORAL EVERY 4 HOURS PRN
Status: DISCONTINUED | OUTPATIENT
Start: 2024-09-15 | End: 2024-09-18

## 2024-09-15 RX ORDER — ONDANSETRON 2 MG/ML
4 INJECTION INTRAMUSCULAR; INTRAVENOUS EVERY 6 HOURS PRN
Status: DISCONTINUED | OUTPATIENT
Start: 2024-09-15 | End: 2024-09-20

## 2024-09-15 RX ORDER — POLYETHYLENE GLYCOL 3350 17 G/17G
17 POWDER, FOR SOLUTION ORAL DAILY PRN
Status: DISCONTINUED | OUTPATIENT
Start: 2024-09-15 | End: 2024-09-20

## 2024-09-15 RX ORDER — MELATONIN
3 NIGHTLY PRN
Status: DISCONTINUED | OUTPATIENT
Start: 2024-09-15 | End: 2024-09-20

## 2024-09-15 RX ORDER — ACETAMINOPHEN 500 MG
500 TABLET ORAL EVERY 4 HOURS PRN
Status: DISCONTINUED | OUTPATIENT
Start: 2024-09-15 | End: 2024-09-20

## 2024-09-15 RX ORDER — METOPROLOL TARTRATE 25 MG/1
25 TABLET, FILM COATED ORAL
Status: DISCONTINUED | OUTPATIENT
Start: 2024-09-15 | End: 2024-09-20

## 2024-09-15 RX ORDER — HYDROMORPHONE HYDROCHLORIDE 1 MG/ML
0.4 INJECTION, SOLUTION INTRAMUSCULAR; INTRAVENOUS; SUBCUTANEOUS EVERY 2 HOUR PRN
Status: DISCONTINUED | OUTPATIENT
Start: 2024-09-15 | End: 2024-09-18

## 2024-09-15 RX ORDER — ONDANSETRON 4 MG/1
8 TABLET, ORALLY DISINTEGRATING ORAL EVERY 8 HOURS PRN
Status: DISCONTINUED | OUTPATIENT
Start: 2024-09-15 | End: 2024-09-20

## 2024-09-15 RX ORDER — HYDROMORPHONE HYDROCHLORIDE 1 MG/ML
0.2 INJECTION, SOLUTION INTRAMUSCULAR; INTRAVENOUS; SUBCUTANEOUS EVERY 2 HOUR PRN
Status: DISCONTINUED | OUTPATIENT
Start: 2024-09-15 | End: 2024-09-18

## 2024-09-15 RX ORDER — HYDROMORPHONE HYDROCHLORIDE 1 MG/ML
0.5 INJECTION, SOLUTION INTRAMUSCULAR; INTRAVENOUS; SUBCUTANEOUS EVERY 30 MIN PRN
Status: DISCONTINUED | OUTPATIENT
Start: 2024-09-15 | End: 2024-09-15

## 2024-09-15 RX ORDER — OLANZAPINE 2.5 MG/1
5 TABLET, FILM COATED ORAL NIGHTLY
Status: DISCONTINUED | OUTPATIENT
Start: 2024-09-15 | End: 2024-09-20

## 2024-09-15 RX ORDER — ECHINACEA PURPUREA EXTRACT 125 MG
1 TABLET ORAL
Status: DISCONTINUED | OUTPATIENT
Start: 2024-09-15 | End: 2024-09-20

## 2024-09-15 RX ORDER — LOSARTAN POTASSIUM 50 MG/1
50 TABLET ORAL DAILY
Status: DISCONTINUED | OUTPATIENT
Start: 2024-09-16 | End: 2024-09-18

## 2024-09-15 RX ORDER — SODIUM PHOSPHATE, DIBASIC AND SODIUM PHOSPHATE, MONOBASIC 7; 19 G/230ML; G/230ML
1 ENEMA RECTAL ONCE AS NEEDED
Status: DISCONTINUED | OUTPATIENT
Start: 2024-09-15 | End: 2024-09-20

## 2024-09-15 RX ORDER — OLANZAPINE 5 MG/1
5 TABLET ORAL NIGHTLY
Status: ON HOLD | COMMUNITY
Start: 2024-09-07

## 2024-09-15 RX ORDER — PROCHLORPERAZINE EDISYLATE 5 MG/ML
5 INJECTION INTRAMUSCULAR; INTRAVENOUS EVERY 8 HOURS PRN
Status: DISCONTINUED | OUTPATIENT
Start: 2024-09-15 | End: 2024-09-20

## 2024-09-15 RX ORDER — SODIUM CHLORIDE 9 MG/ML
INJECTION, SOLUTION INTRAVENOUS CONTINUOUS
Status: DISCONTINUED | OUTPATIENT
Start: 2024-09-15 | End: 2024-09-18

## 2024-09-15 NOTE — ED INITIAL ASSESSMENT (HPI)
Pt presents to ER with abdominal pain starting about 2 weeks ago. Tender in RUQ. Pt also states SOB starting about 2 -3 days ago. Pt had biopsy of liver on Tuesday. Pt presents jaundiced in room as well.

## 2024-09-15 NOTE — ED PROVIDER NOTES
Patient Seen in: [facility] Emergency Department      History     Chief Complaint   Patient presents with    Abdomen/Flank Pain     Stated Complaint: ca pt, cp    Subjective:   HPI    This is a 53-year-old gentleman history of metastatic esophageal cancer here for evaluation of shortness of breath abdominal pain.  States that he had a liver biopsy 5 days ago, is on Eliquis, states his Eliquis was held for multiple days prior to the episode.  States was having right-sided abdominal discomfort even prior to the biopsy states this has continued to progress, also states for the past 2 to 3 days feels very short of breath when getting up to walk around.  Reports decreased appetite.  His wife noticed that today he seemed very jaundiced and they decided come in to get checked out.  No reported vomiting, denies any chest pain any fevers any other complaints or concerns.    Objective:   Past Medical History:    Back problem    Belching    Black stools    Borderline diabetes    Dx in 8/2013 - HgA1C 6.2%    C. difficile diarrhea    pt treated and without symptoms    Chest pain    Coronary artery disease    On 8/16/13: CABG x 4 with LIMA to LAD and SVG to diagonal, OM and PDA    Decorative tattoo    Depression    Difficult intubation    h/o esophagectomy for CA; developed esophagobronchial vistula    Disorder of liver    LIVER CA    Esophageal cancer (HCC)    completed chemo    Esophageal reflux    Essential hypertension    Exposure to medical diagnostic radiation    last tx 8/18/2022    Frequent urination    Gastroparesis    Heartburn    High blood pressure    High cholesterol    Found when I had quadruple bypass    History of COVID-19    asymptomatic - pt was dx during a hospitalization for another diagnosis. No continued symptoms    History of stomach ulcers    Hyperlipidemia    Hyperlipidemia LDL goal < 70    Indigestion    Morbid obesity with BMI of 40.0-44.9, adult (HCC)    Muscle weakness    Nausea vomiting and  diarrhea    Nontoxic multinodular goiter    Dx in 2013: pt was told that imaging showed thyroid cysts per PCP    Pancreatic cancer (HCC)    last dose 2023 is scheduled for another round 23    Peripheral vascular disease (HCC)    pt denies    Personal history of antineoplastic chemotherapy    for esophageal cancer/completed    Personal history of antineoplastic chemotherapy    pancreatic cancer    Personal history of antineoplastic chemotherapy    Last treatment 3/12/24    Problems with swallowing    Pulmonary nodules    Dx in 2013: CT chest showed small bilateral fissural-based lung nodules less than 1 cm    S/P CABG x 4    On 13: CABG x 4 with LIMA to LAD and SVG to diagonal, OM and PDA    Shortness of breath    when coughing; no oxygen    Vomiting              Past Surgical History:   Procedure Laterality Date    Appendectomy      Appendectomy      Arthroscopy of joint unlisted      right shoulder    Cabg      On 13: CABG x 4 with LIMA to LAD and SVG to diagonal, OM and PDA    Cardiac cath lab      On 2013: cardiac cath showed 3-vessel disease    Other surgical history      1.       Laparoscopic robotic-assisted esophagogastrectomy.    Port, indwelling, imp                  Social History     Socioeconomic History    Marital status:     Number of children: 3   Occupational History    Occupation: works as  - on workman's comp   Tobacco Use    Smoking status: Former     Current packs/day: 0.00     Average packs/day: 1 pack/day for 27.0 years (27.0 ttl pk-yrs)     Types: Cigarettes     Start date: 8/15/1986     Quit date: 8/15/2013     Years since quittin.0    Smokeless tobacco: Never    Tobacco comments:     Quit smoking    Vaping Use    Vaping status: Never Used   Substance and Sexual Activity    Alcohol use: Never    Drug use: Never    Sexual activity: Not Currently     Partners: Female   Other Topics Concern    Caffeine Concern Yes    Stress Concern No     Weight Concern No    Special Diet No    Exercise No    Seat Belt No   Social History Narrative    Lives with life partner    Has 3 daughters - 1 lives closeby, 1 in [place], 1 in [place]     Social Determinants of Health     Financial Resource Strain: Low Risk  (5/31/2024)    Received [facility]    Overall Financial Resource Strain (CARDIA)     Difficulty of Paying Living Expenses: Not hard at all   Food Insecurity: No Food Insecurity (6/18/2024)    Food Insecurity     Food Insecurity: Never true   Transportation Needs: No Transportation Needs (6/18/2024)    Transportation Needs     Lack of Transportation: No   Housing Stability: Low Risk  (6/18/2024)    Housing Stability     Housing Instability: No     Housing Instability Emergency: No              Review of Systems    Positive for stated Chief Complaint: Abdomen/Flank Pain    Other systems are as noted in HPI.  Constitutional and vital signs reviewed.      All other systems reviewed and negative except as noted above.    Physical Exam     ED Triage Vitals [09/15/24 1207]   /78   Pulse 64   Resp 19   Temp 98.5 °F (36.9 °C)   Temp src Oral   SpO2 100 %   O2 Device None (Room air)       Current Vitals:   Vital Signs  BP: (!) 149/93  Pulse: 55  Resp: 24  Temp: 98.5 °F (36.9 °C)  Temp src: Oral  MAP (mmHg): (!) 108    Oxygen Therapy  SpO2: 99 %  O2 Device: None (Room air)            Physical Exam        Physical Exam  Vitals signs and nursing note reviewed.   General:  Patient laying supine in the bed in no acute distress, patient is jaundiced  Head: Normocephalic and atraumatic.   HEENT:  Mucous membranes are moist.   Cardiovascular:  Normal rate and regular rhythm.  No Edema  Pulmonary:  Pulmonary effort is normal.  Normal breath sounds. No wheezing, rhonchi or rales.   Abdominal: Abdomen is soft there is right-sided hepatomegaly there is tenderness to palpation no guarding or rebound tenderness.  Skin: Warm and dry, moderate jaundice  noted  Neurological: Awake alert, speech is normal        ED Course     Labs Reviewed   COMP METABOLIC PANEL (14) - Abnormal; Notable for the following components:       Result Value    Glucose 145 (*)     Creatinine 0.64 (*)      (*)      (*)     Alkaline Phosphatase 498 (*)     Bilirubin, Total 6.7 (*)     All other components within normal limits   CBC WITH DIFFERENTIAL WITH PLATELET - Abnormal; Notable for the following components:    RBC 3.95 (*)     HGB 11.9 (*)     HCT 36.6 (*)     Lymphocyte Absolute 0.69 (*)     All other components within normal limits   URINALYSIS WITH CULTURE REFLEX - Abnormal; Notable for the following components:    Spec Gravity >1.030 (*)     All other components within normal limits   ICTOTEST - Abnormal; Notable for the following components:    Ictotest Positive (*)     All other components within normal limits   LIPASE - Normal   RAINBOW DRAW LAVENDER   RAINBOW DRAW LIGHT GREEN   RAINBOW DRAW BLUE   RAINBOW DRAW GOLD     EKG    Rate, intervals and axes as noted on EKG Report.  Rate: 66  Rhythm: Sinus Rhythm  Reading: No acute ischemic changes                        MDM      This is a 53-year-old gentleman history of metastatic esophageal cancer here for evaluation of abdominal discomfort shortness of breath.  Did have a liver biopsy last week, pain started previously.  Shortness of breath however did start a few days ago.  Differential includes acute pulmonary embolism pneumonia, biliary obstruction secondary to metastatic burden in the liver, bleeding.  Patient is afebrile hemodynamically stable no reported fevers at home white count is normal, transaminitis with elevated bilirubin noted.  CT abdomen pelvis was performed shows increasing metastatic burden, biliary obstruction, some question of possible aspiration pneumonia.  Patient was covered with Zosyn, will admit for further monitoring evaluation Case endorsed to [place] hospitalist who agrees with admission,  discussed with Dr. Cardona from GI patient will be n.p.o. after midnight  Admission disposition: 9/15/2024  3:14 PM                                        Medical Decision Making      Disposition and Plan     Clinical Impression:  1. Transaminitis    2. Biliary obstruction (HCC)    3. Hyperbilirubinemia         Disposition:  Admit  9/15/2024  3:14 pm    Follow-up:  No follow-up provider specified.        Medications Prescribed:  Current Discharge Medication List                            Hospital Problems       Present on Admission  Date Reviewed: 8/14/2024            ICD-10-CM Noted POA    * (Principal) Transaminitis R74.01 9/15/2024 Unknown    Biliary obstruction (HCC) K83.1 9/15/2024 Unknown    Hyperbilirubinemia E80.6 9/15/2024 Unknown

## 2024-09-15 NOTE — ED QUICK NOTES
Orders for admission, patient is aware of plan and ready to go upstairs. Any questions, please call ED RN Kenyatta at extension [phone].     Patient Covid vaccination status: Fully vaccinated and immunocompromised     COVID Test Ordered in ED: None    COVID Suspicion at Admission: N/A    Running Infusions:  per MAR    Mental Status/LOC at time of transport: A&Ox3    Other pertinent information:   CIWA score: N/A   NIH score:  N/A

## 2024-09-15 NOTE — H&P
[facility]     Dontae Cortez Jr. Patient Status:  Emergency    10/15/1970 MRN [id number]   Location [facility] EMERGENCY DEPARTMENT Attending Vishal Harrison MD   Hosp Day # 0 PCP Adrian Horowitz MD     Chief Complaint: Abdominal pain    Subjective:    History of Present Illness:     Dontae Cortez Jr. is a 53 year old male with a PMH Of metastatic esophageal and pancreatic cancer who presents with abdominal pain and yellowing of skin.  They have noticed symptoms worsening over the past couple of days.  Denies any n/v, no fevers, no diarrhea.  No other acute complaints.      History/Other:    Past Medical History:  Past Medical History:    Back problem    Belching    Black stools    Borderline diabetes    Dx in 2013 - HgA1C 6.2%    C. difficile diarrhea    pt treated and without symptoms    Chest pain    Coronary artery disease    On 13: CABG x 4 with LIMA to LAD and SVG to diagonal, OM and PDA    Decorative tattoo    Depression    Difficult intubation    h/o esophagectomy for CA; developed esophagobronchial vistula    Disorder of liver    LIVER CA    Esophageal cancer (HCC)    completed chemo    Esophageal reflux    Essential hypertension    Exposure to medical diagnostic radiation    last tx 2022    Frequent urination    Gastroparesis    Heartburn    High blood pressure    High cholesterol    Found when I had quadruple bypass    History of COVID-19    asymptomatic - pt was dx during a hospitalization for another diagnosis. No continued symptoms    History of stomach ulcers    Hyperlipidemia    Hyperlipidemia LDL goal < 70    Indigestion    Morbid obesity with BMI of 40.0-44.9, adult (HCC)    Muscle weakness    Nausea vomiting and diarrhea    Nontoxic multinodular goiter    Dx in 2013: pt was told that imaging showed thyroid cysts per PCP    Pancreatic cancer (HCC)    last dose 2023 is scheduled for another round 23    Peripheral vascular  disease (HCC)    pt denies    Personal history of antineoplastic chemotherapy    for esophageal cancer/completed    Personal history of antineoplastic chemotherapy    pancreatic cancer    Personal history of antineoplastic chemotherapy    Last treatment 3/12/24    Problems with swallowing    Pulmonary nodules    Dx in 2013: CT chest showed small bilateral fissural-based lung nodules less than 1 cm    S/P CABG x 4    On 13: CABG x 4 with LIMA to LAD and SVG to diagonal, OM and PDA    Shortness of breath    when coughing; no oxygen    Vomiting     Past Surgical History:   Past Surgical History:   Procedure Laterality Date    Appendectomy      Appendectomy      Arthroscopy of joint unlisted      right shoulder    Cabg      On 13: CABG x 4 with LIMA to LAD and SVG to diagonal, OM and PDA    Cardiac cath lab      On 2013: cardiac cath showed 3-vessel disease    Other surgical history      1.       Laparoscopic robotic-assisted esophagogastrectomy.    Port, indwelling, imp        Family History:   Family History   Problem Relation Age of Onset    Cancer Mother         breast and colon     Diabetes Neg      Social History:    reports that he quit smoking about 11 years ago. His smoking use included cigarettes. He started smoking about 38 years ago. He has a 27 pack-year smoking history. He has never used smokeless tobacco. He reports that he does not drink alcohol and does not use drugs.     Allergies:   Allergies   Allergen Reactions    Oxaliplatin HIVES     Shaking        Medications:    Current Facility-Administered Medications on File Prior to Encounter   Medication Dose Route Frequency Provider Last Rate Last Admin    [] midazolam (Versed) 2 MG/2ML injection 1 mg  1 mg Intravenous Q5 Min PRN Jenny Navarrete MD   1 mg at 09/10/24 1104    [] fentaNYL (Sublimaze) 50 mcg/mL injection 50 mcg  50 mcg Intravenous Q5 Min PRN Jenny Navarrete MD   50 mcg at 09/10/24 1104    [COMPLETED] heparin  (Porcine) 100 Units/mL lock flush 500 Units  5 mL Intracatheter Once Cuong Wan MD   500 Units at 09/10/24 1415    [COMPLETED] heparin (Porcine) 100 Units/mL lock flush 500 Units  5 mL Intracatheter Once Senia Foster MD   500 Units at 08/29/24 0845    [COMPLETED] ondansetron (Zofran) 16 mg, dexAMETHasone (Decadron) 20 mg in sodium chloride 0.9% 110 mL IVPB   Intravenous Once Grecia Angelo APRN   Stopped at 08/14/24 1148    [COMPLETED] nivolumab (Opdivo) 240 mg in sodium chloride 0.9% 124 mL IVPB  240 mg Intravenous Once Grecia Angelo APRN   Stopped at 08/14/24 1047    [COMPLETED] trastuzumab-qyyp (Trazimera) 294 mg in sodium chloride 0.9% 264 mL infusion  4 mg/kg (Treatment Plan Recorded) Intravenous Once Grecia Angelo APRN   Stopped at 08/14/24 1124    [COMPLETED] leucovorin 800 mg in sodium chloride 0.9 % 250 mL infusion  400 mg/m2 (Treatment Plan Recorded) Intravenous Once Grecia Angelo APRN   Stopped at 08/14/24 1225    [COMPLETED] famotidine (Pepcid) 20 mg/2mL injection 20 mg  20 mg Intravenous Once Grecia Angelo APRN   20 mg at 08/14/24 1153    [COMPLETED] heparin (Porcine) 100 Units/mL lock flush 500 Units  5 mL Intracatheter Once Senia Foster MD   500 Units at 08/08/24 0810    [COMPLETED] methylPREDNISolone sodium succinate (Solu-MEDROL) injection 125 mg  125 mg Intravenous Once Senia Foster MD   125 mg at 07/29/24 1126    [COMPLETED] diphenhydrAMINE (Benadryl) 50 mg/mL  injection 25 mg  25 mg Intravenous Once Senia Foster MD   25 mg at 07/29/24 1110    [COMPLETED] famotidine (Pepcid) 20 mg/2mL injection 20 mg  20 mg Intravenous Once Senia Foster MD   20 mg at 07/29/24 1126    [COMPLETED] ondansetron (Zofran) 16 mg, dexAMETHasone (Decadron) 20 mg in sodium chloride 0.9% 110 mL IVPB   Intravenous Once Senia Foster MD   Stopped at 07/29/24 1123    [COMPLETED] nivolumab (Opdivo) 240 mg in sodium chloride 0.9% 124 mL IVPB  240 mg Intravenous Once Senia Foster MD    Stopped at 07/29/24 1029    [COMPLETED] trastuzumab-qyyp (Trazimera) 294 mg in sodium chloride 0.9% 264 mL infusion  4 mg/kg (Treatment Plan Recorded) Intravenous Once Senia Foster MD   Stopped at 07/29/24 1102    [COMPLETED] oxaliplatin (Eloxatin) 150 mg in dextrose 5% 280 mL infusion  75 mg/m2 (Treatment Plan Recorded) Intravenous Once Senia Foster MD   Stopped at 07/29/24 1327    [COMPLETED] leucovorin 800 mg in dextrose 5% 250 mL infusion  400 mg/m2 (Treatment Plan Recorded) Intravenous Once Senia Foster MD   Stopped at 07/29/24 1327    [COMPLETED] famotidine (Pepcid) 20 mg/2mL injection 20 mg  20 mg Intravenous Once Grecia Angelo APRN   20 mg at 07/29/24 1314    [COMPLETED] prochlorperazine (Compazine) tab 10 mg  10 mg Oral Once Grecia Angelo APRN   10 mg at 07/29/24 1314    [COMPLETED] diphenhydrAMINE (Benadryl) 50 mg/mL  injection 25 mg  25 mg Intravenous Once Grecia Angelo APRN   25 mg at 07/29/24 1330    [COMPLETED] methylPREDNISolone sodium succinate (Solu-MEDROL) injection 125 mg  125 mg Intravenous Once Grecia Angelo APRN   125 mg at 07/15/24 0931    [COMPLETED] diphenhydrAMINE (Benadryl) 50 mg/mL  injection 25 mg  25 mg Intravenous Once Grecia Angelo APRN   25 mg at 07/15/24 0927    [COMPLETED] famotidine (Pepcid) 20 mg/2mL injection 20 mg  20 mg Intravenous Once Grecia Angelo APRN   20 mg at 07/15/24 0925    [COMPLETED] ondansetron (Zofran) 16 mg, dexAMETHasone (Decadron) 20 mg in sodium chloride 0.9% 110 mL IVPB   Intravenous Once Grecia Angelo APRN   Stopped at 07/15/24 1054    [COMPLETED] nivolumab (Opdivo) 240 mg in sodium chloride 0.9% 124 mL IVPB  240 mg Intravenous Once Grecia Angelo APRN   Stopped at 07/15/24 1004    [COMPLETED] trastuzumab-qyyp (Trazimera) 294 mg in sodium chloride 0.9% 264 mL infusion  4 mg/kg (Treatment Plan Recorded) Intravenous Once Grecia Angelo APRN   Stopped at 07/15/24 1035    [COMPLETED] oxaliplatin (Eloxatin) 150 mg in  dextrose 5% 280 mL infusion  75 mg/m2 (Treatment Plan Recorded) Intravenous Once Grecia Angelo APRN   Stopped at 07/15/24 1230    [COMPLETED] leucovorin 800 mg in dextrose 5% 250 mL infusion  400 mg/m2 (Treatment Plan Recorded) Intravenous Once Grecia Angelo APRN   Stopped at 07/15/24 1230    [COMPLETED] diphenhydrAMINE (Benadryl) 50 mg/mL  injection 25 mg  25 mg Intravenous Once Grecia Angelo APRN   25 mg at 07/15/24 1220    [COMPLETED] diphenhydrAMINE (Benadryl) 50 mg/mL  injection 25 mg  25 mg Intravenous Once Grecia Angelo APRN   25 mg at 07/15/24 1230    [COMPLETED] famotidine (Pepcid) 20 mg/2mL injection 20 mg  20 mg Intravenous Once Grecia Angelo APRN   20 mg at 07/15/24 1253    [COMPLETED] atropine sulfate 0.4 mg/mL injection 0.2 mg  0.2 mg Intravenous Once Grecia Angelo APRN   0.2 mg at 07/15/24 1255    [COMPLETED] methylPREDNISolone sodium succinate (Solu-MEDROL) injection 125 mg  125 mg Intravenous Once Katie Conner APRN   125 mg at 06/27/24 1124    [COMPLETED] diphenhydrAMINE (Benadryl) 50 mg/mL  injection 25 mg  25 mg Intravenous Once Katie Conner APRN   25 mg at 06/27/24 1126    [COMPLETED] famotidine (Pepcid) 20 mg/2mL injection 20 mg  20 mg Intravenous Once Katie Conner APRN   20 mg at 06/27/24 1127    [COMPLETED] ondansetron (Zofran) 16 mg, dexAMETHasone (Decadron) 20 mg in sodium chloride 0.9% 110 mL IVPB   Intravenous Once Katie Conner APRN   Stopped at 06/27/24 1212    [COMPLETED] nivolumab (Opdivo) 240 mg in sodium chloride 0.9% 124 mL IVPB  240 mg Intravenous Once Katie Conner APRN   Stopped at 06/27/24 1117    [COMPLETED] trastuzumab-qyyp (Trazimera) 336 mg in sodium chloride 0.9% 266 mL infusion  4 mg/kg (Treatment Plan Recorded) Intravenous Once Katie Conner APRN   Stopped at 06/27/24 1154    [COMPLETED] oxaliplatin (Eloxatin) 155 mg in dextrose 5% 281 mL infusion  75 mg/m2 (Treatment Plan Recorded) Intravenous Once Katie Conner APRN    Stopped at 06/27/24 1500    [COMPLETED] leucovorin 850 mg in dextrose 5% 250 mL infusion  400 mg/m2 (Treatment Plan Recorded) Intravenous Once Katie Conner APRN   Stopped at 06/27/24 1500    [COMPLETED] HYDROcodone-acetaminophen (Norco)  MG per tab 1 tablet  1 tablet Oral Once Katie Conner APRN   1 tablet at 06/27/24 1254    [COMPLETED] potassium chloride (Klor-Con M20) tab 40 mEq  40 mEq Oral Once Mgean Arshad MD   40 mEq at 06/19/24 0028    [COMPLETED] morphINE PF 4 MG/ML injection 4 mg  4 mg Intravenous Q30 Min PRN Aman Torrez MD   4 mg at 06/18/24 2308    [COMPLETED] iopamidol 76% (ISOVUE-370) injection for power injector  100 mL Intravenous ONCE PRN Aman Torrez MD   100 mL at 06/18/24 2038    [COMPLETED] heparin (Porcine) 1000 UNIT/ML injection - BOLUS IV 6,200 Units  80 Units/kg Intravenous Once Aman Torrez MD   6,200 Units at 06/18/24 2135    [COMPLETED] heparin (Porcine) 17223 units/250 mL infusion ED (PE/DVT/THROMBUS) INITIAL DOSE  18 Units/kg/hr Intravenous Once Aman Torrez MD 14 mL/hr at 06/18/24 2138 18 Units/kg/hr at 06/18/24 2138     Current Outpatient Medications on File Prior to Encounter   Medication Sig Dispense Refill    OLANZapine 5 MG Oral Tab Take 1 tablet (5 mg total) by mouth nightly.      prochlorperazine (COMPAZINE) 10 mg tablet Take 1 tablet (10 mg total) by mouth every 6 (six) hours as needed for Nausea.      pantoprazole 40 MG Oral Tab EC Take 1 tablet (40 mg total) by mouth before breakfast. 30 tablet 0    metoprolol tartrate 25 MG Oral Tab Take 1 tablet (25 mg total) by mouth 2x Daily(Beta Blocker). 60 tablet 1    oxyCODONE 5 MG Oral Tab Take 1 tablet (5 mg total) by mouth every 4 (four) hours as needed for Pain. 90 tablet 0    ondansetron 8 MG Oral Tablet Dispersible Take 1 tablet (8 mg total) by mouth every 8 (eight) hours as needed for Nausea. 30 tablet 0    dicyclomine 10 MG Oral Cap Take 1 capsule (10 mg total) by mouth 3 (three) times daily as  needed. 90 capsule 1    pregabalin 25 MG Oral Cap Take 1 capsule (25 mg total) by mouth at bedtime. 30 capsule 1    furosemide 20 MG Oral Tab Take 1 tablet (20 mg total) by mouth daily. 30 tablet 1    Potassium Chloride ER 20 MEQ Oral Tab CR Take 1 tablet by mouth daily. 30 tablet 1    apixaban 5 MG Oral Tab Take 1 tablet (5 mg total) by mouth 2 (two) times daily. 60 tablet 2    SERTRALINE 100 MG Oral Tab TAKE 1.5 TABLETS (150MG TOTAL) BY MOUTH DAILY (Patient taking differently: Take 2 tablets (200 mg total) by mouth daily.) 135 tablet 1    losartan 50 MG Oral Tab Take 1 tablet (50 mg total) by mouth daily. 90 tablet 2    BENZONATATE 100 MG Oral Cap TAKE 1 CAPSULE BY MOUTH THREE TIMES A DAY AS NEEDED FOR COUGH (Patient not taking: Reported on 9/15/2024) 90 capsule 0    OMEPRAZOLE 40 MG Oral Capsule Delayed Release TAKE 1 CAPSULE BY MOUTH TWICE A DAY BEFORE MEALS (Patient not taking: Reported on 9/15/2024) 180 capsule 1       Review of Systems:   A comprehensive review of systems was completed.    Pertinent positives and negatives noted in the HPI.    Objective:   Physical Exam:    /76   Pulse 55   Temp 98.5 °F (36.9 °C) (Oral)   Resp 17   Ht 6' 2\" (1.88 m)   Wt 155 lb (70.3 kg)   SpO2 100%   BMI 19.90 kg/m²   General: No acute distress, Alert, jaundiced  Respiratory: No rhonchi, no wheezes  Cardiovascular: S1, S2. Regular rate and rhythm  Abdomen: Soft, Non-tender, non-distended, positive bowel sounds  Neuro: No new focal deficits  Extremities: No edema    Results:    Labs:      Labs Last 24 Hours:    Recent Labs   Lab 09/15/24  1225   RBC 3.95*   HGB 11.9*   HCT 36.6*   MCV 92.7   MCH 30.1   MCHC 32.5   RDW 16.2   NEPRELIM 4.66   WBC 6.4   .0       Recent Labs   Lab 09/15/24  1225   *   BUN 9   CREATSERUM 0.64*   EGFRCR 112   CA 8.9   ALB 3.3      K 4.1      CO2 29.0   ALKPHO 498*   *   *   BILT 6.7*   TP 6.0       Lab Results   Component Value Date    PT 20.4  (H) 01/30/2014    PT 22.9 (H) 01/13/2014    PT 25.7 (H) 01/08/2014    INR 1.17 09/10/2024    INR 1.35 (H) 08/29/2024    INR 1.13 04/16/2024       No results for input(s): \"TROP\", \"TROPHS\", \"CK\" in the last 168 hours.    No results for input(s): \"TROP\", \"PBNP\" in the last 168 hours.    No results for input(s): \"PCT\" in the last 168 hours.    Imaging: Imaging data reviewed in Epic.    Assessment & Plan:      #Abdominal pain  #Jaundice - likely obstructive   #Elevated liver enzymes  Could be obstruction given his CT showing size progression of liver mass  Pain control, anti-emetics   GI consult    #Metastatic adenocarcinoma  Had liver biopsy done on 9/10/24 - results reviewed    # Metastatic esophageal carcinoma  # Metastatic pancreatic carcinoma  s/p esophagectomy and 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 and ASD occluder device   CT abd showing pancreatic mass that progressed in size, progressed hepatic metastasis   Hem/onc consult     #Previous PE/DVT  Resume eliquis, will hold for tonight in case for procedure     #Essential HTN - losartan, metoprolol, furosemide   #DLD  #GERD/PUD - PPI  #CAD s/p CABG  #Depression/Anxiety - Zoloft, Olanzapine         Plan of care discussed with patient, er physician, family, nurse     Jan Brito MD    Supplementary Documentation:     The 21st Century Cures Act makes medical notes like these available to patients in the interest of transparency. Please be advised this is a medical document. Medical documents are intended to carry relevant information, facts as evident, and the clinical opinion of the practitioner. The medical note is intended as peer to peer communication and may appear blunt or direct. It is written in medical language and may contain abbreviations or verbiage that are unfamiliar.

## 2024-09-15 NOTE — PLAN OF CARE
NURSING ADMISSION NOTE      Patient admitted via Cart  Oriented to room.  Safety precautions initiated.  Bed in low position.  Call light in reach.    Pt received alert and oriented x4, c/o pain to abdomen and back. BP elevated. IVF initiated. Oncology notified of consult. NPO at midnight for possible GI procedure. All questions answered at this time. Navigator completed.

## 2024-09-15 NOTE — ED QUICK NOTES
Patient appears in NAD, RR even/NL, updated on POC, waiting for admission    Medicated for pain per MAR  Wife at bedside    call light within reach, bed in low and locked position, on continuous cardiac monitoring, wctm closely.

## 2024-09-16 ENCOUNTER — APPOINTMENT (OUTPATIENT)
Dept: HEMATOLOGY/ONCOLOGY | Age: 54
End: 2024-09-16
Attending: INTERNAL MEDICINE
Payer: COMMERCIAL

## 2024-09-16 LAB
ALBUMIN SERPL-MCNC: 3 G/DL (ref 3.2–4.8)
ALBUMIN/GLOB SERPL: 1.3 {RATIO} (ref 1–2)
ALP LIVER SERPL-CCNC: 465 U/L
ALT SERPL-CCNC: 358 U/L
ANION GAP SERPL CALC-SCNC: 3 MMOL/L (ref 0–18)
AST SERPL-CCNC: 209 U/L (ref ?–34)
ATRIAL RATE: 66 BPM
BASOPHILS # BLD AUTO: 0.05 X10(3) UL (ref 0–0.2)
BASOPHILS NFR BLD AUTO: 1 %
BILIRUB SERPL-MCNC: 6.7 MG/DL (ref 0.3–1.2)
BUN BLD-MCNC: 9 MG/DL (ref 9–23)
CALCIUM BLD-MCNC: 8.7 MG/DL (ref 8.7–10.4)
CHLORIDE SERPL-SCNC: 104 MMOL/L (ref 98–112)
CO2 SERPL-SCNC: 30 MMOL/L (ref 21–32)
CREAT BLD-MCNC: 0.53 MG/DL
EGFRCR SERPLBLD CKD-EPI 2021: 119 ML/MIN/1.73M2 (ref 60–?)
EOSINOPHIL # BLD AUTO: 0.23 X10(3) UL (ref 0–0.7)
EOSINOPHIL NFR BLD AUTO: 4.5 %
ERYTHROCYTE [DISTWIDTH] IN BLOOD BY AUTOMATED COUNT: 16.4 %
GLOBULIN PLAS-MCNC: 2.4 G/DL (ref 2–3.5)
GLUCOSE BLD-MCNC: 96 MG/DL (ref 70–99)
HCT VFR BLD AUTO: 33.7 %
HGB BLD-MCNC: 10.7 G/DL
IMM GRANULOCYTES # BLD AUTO: 0.22 X10(3) UL (ref 0–1)
IMM GRANULOCYTES NFR BLD: 4.3 %
LYMPHOCYTES # BLD AUTO: 0.41 X10(3) UL (ref 1–4)
LYMPHOCYTES NFR BLD AUTO: 8.1 %
MAGNESIUM SERPL-MCNC: 1.6 MG/DL (ref 1.6–2.6)
MCH RBC QN AUTO: 29.6 PG (ref 26–34)
MCHC RBC AUTO-ENTMCNC: 31.8 G/DL (ref 31–37)
MCV RBC AUTO: 93.4 FL
MONOCYTES # BLD AUTO: 0.69 X10(3) UL (ref 0.1–1)
MONOCYTES NFR BLD AUTO: 13.6 %
NEUTROPHILS # BLD AUTO: 3.47 X10 (3) UL (ref 1.5–7.7)
NEUTROPHILS # BLD AUTO: 3.47 X10(3) UL (ref 1.5–7.7)
NEUTROPHILS NFR BLD AUTO: 68.5 %
OSMOLALITY SERPL CALC.SUM OF ELEC: 283 MOSM/KG (ref 275–295)
P AXIS: 45 DEGREES
P-R INTERVAL: 144 MS
PLATELET # BLD AUTO: 169 10(3)UL (ref 150–450)
POTASSIUM SERPL-SCNC: 4.1 MMOL/L (ref 3.5–5.1)
PROT SERPL-MCNC: 5.4 G/DL (ref 5.7–8.2)
Q-T INTERVAL: 404 MS
QRS DURATION: 82 MS
QTC CALCULATION (BEZET): 423 MS
R AXIS: 63 DEGREES
RBC # BLD AUTO: 3.61 X10(6)UL
SODIUM SERPL-SCNC: 137 MMOL/L (ref 136–145)
T AXIS: 90 DEGREES
VENTRICULAR RATE: 66 BPM
WBC # BLD AUTO: 5.1 X10(3) UL (ref 4–11)

## 2024-09-16 PROCEDURE — 99232 SBSQ HOSP IP/OBS MODERATE 35: CPT | Performed by: HOSPITALIST

## 2024-09-16 PROCEDURE — 99255 IP/OBS CONSLTJ NEW/EST HI 80: CPT | Performed by: INTERNAL MEDICINE

## 2024-09-16 RX ORDER — MAGNESIUM OXIDE 400 MG/1
400 TABLET ORAL ONCE
Status: COMPLETED | OUTPATIENT
Start: 2024-09-16 | End: 2024-09-16

## 2024-09-16 RX ORDER — LOSARTAN POTASSIUM 50 MG/1
50 TABLET ORAL DAILY
Qty: 90 TABLET | Refills: 0 | Status: SHIPPED | OUTPATIENT
Start: 2024-09-16

## 2024-09-16 NOTE — PAYOR COMM NOTE
ADMISSION REVIEW     Payor: [employer] [facility] PPO  Subscriber #:  [id number]  Authorization Number: [id number]    Admit date: 9/15/24  Admit time:  5:25 PM       REVIEW DOCUMENTATION:     ED Provider Notes        ED Provider Notes signed by Vishal Harrison MD at 9/15/2024  5:28 PM       Author: Vishal Harrison MD Service: -- Author Type: Physician    Filed: 9/15/2024  5:28 PM Date of Service: 9/15/2024  1:43 PM Status: Signed    : Vishal Harrison MD (Physician)           Patient Seen in: [facility] Emergency Department      History     Chief Complaint   Patient presents with    Abdomen/Flank Pain     Stated Complaint: ca pt, cp    Subjective:   HPI    This is a 53-year-old gentleman history of metastatic esophageal cancer here for evaluation of shortness of breath abdominal pain.  States that he had a liver biopsy 5 days ago, is on Eliquis, states his Eliquis was held for multiple days prior to the episode.  States was having right-sided abdominal discomfort even prior to the biopsy states this has continued to progress, also states for the past 2 to 3 days feels very short of breath when getting up to walk around.  Reports decreased appetite.  His wife noticed that today he seemed very jaundiced and they decided come in to get checked out.  No reported vomiting, denies any chest pain any fevers any other complaints or concerns.    Objective:   Past Medical History:    Back problem    Belching    Black stools    Borderline diabetes    Dx in 8/2013 - HgA1C 6.2%    C. difficile diarrhea    pt treated and without symptoms    Chest pain    Coronary artery disease    On 8/16/13: CABG x 4 with LIMA to LAD and SVG to diagonal, OM and PDA    Decorative tattoo    Depression    Difficult intubation    h/o esophagectomy for CA; developed esophagobronchial vistula    Disorder of liver    LIVER CA    Esophageal cancer (HCC)    completed chemo    Esophageal reflux    Essential hypertension    Exposure to medical  diagnostic radiation    last tx 8/18/2022    Frequent urination    Gastroparesis    Heartburn    High blood pressure    High cholesterol    Found when I had quadruple bypass    History of COVID-19    asymptomatic - pt was dx during a hospitalization for another diagnosis. No continued symptoms    History of stomach ulcers    Hyperlipidemia    Hyperlipidemia LDL goal < 70    Indigestion    Morbid obesity with BMI of 40.0-44.9, adult (HCC)    Muscle weakness    Nausea vomiting and diarrhea    Nontoxic multinodular goiter    Dx in 8/2013: pt was told that imaging showed thyroid cysts per PCP    Pancreatic cancer (HCC)    last dose 12/4/2023 is scheduled for another round 12/27/23    Peripheral vascular disease (HCC)    pt denies    Personal history of antineoplastic chemotherapy    for esophageal cancer/completed    Personal history of antineoplastic chemotherapy    pancreatic cancer    Personal history of antineoplastic chemotherapy    Last treatment 3/12/24    Problems with swallowing    Pulmonary nodules    Dx in 8/2013: CT chest showed small bilateral fissural-based lung nodules less than 1 cm    S/P CABG x 4    On 8/16/13: CABG x 4 with LIMA to LAD and SVG to diagonal, OM and PDA    Shortness of breath    when coughing; no oxygen    Vomiting              Past Surgical History:   Procedure Laterality Date    Appendectomy      Appendectomy      Arthroscopy of joint unlisted      right shoulder    Cabg      On 8/16/13: CABG x 4 with LIMA to LAD and SVG to diagonal, OM and PDA    Cardiac cath lab      On 8/14/2013: cardiac cath showed 3-vessel disease    Other surgical history      1.       Laparoscopic robotic-assisted esophagogastrectomy.    Port, indwelling, imp         Social History     Socioeconomic History    Marital status:     Number of children: 3   Occupational History    Occupation: works as  - on workman's comp   Tobacco Use    Smoking status: Former     Current packs/day: 0.00      Average packs/day: 1 pack/day for 27.0 years (27.0 ttl pk-yrs)     Types: Cigarettes     Start date: 8/15/1986     Quit date: 8/15/2013     Years since quittin.0    Smokeless tobacco: Never    Tobacco comments:     Quit smoking 2013   Vaping Use    Vaping status: Never Used   Substance and Sexual Activity    Alcohol use: Never    Drug use: Never    Sexual activity: Not Currently     Partners: Female   Other Topics Concern    Caffeine Concern Yes    Stress Concern No    Weight Concern No    Special Diet No    Exercise No    Seat Belt No   Social History Narrative    Lives with life partner    Has 3 daughters - 1 lives closeby, 1 in [place], 1 in [place]     Social Determinants of Health     Financial Resource Strain: Low Risk  (2024)    Received from [facility]    Overall Financial Resource Strain (CARDIA)     Difficulty of Paying Living Expenses: Not hard at all   Food Insecurity: No Food Insecurity (2024)    Food Insecurity     Food Insecurity: Never true   Transportation Needs: No Transportation Needs (2024)    Transportation Needs     Lack of Transportation: No   Housing Stability: Low Risk  (2024)    Housing Stability     Housing Instability: No     Housing Instability Emergency: No              Review of Systems    Positive for stated Chief Complaint: Abdomen/Flank Pain    Other systems are as noted in HPI.  Constitutional and vital signs reviewed.      All other systems reviewed and negative except as noted above.    Physical Exam     ED Triage Vitals [09/15/24 1207]   /78   Pulse 64   Resp 19   Temp 98.5 °F (36.9 °C)   Temp src Oral   SpO2 100 %   O2 Device None (Room air)       Current Vitals:   Vital Signs  BP: (!) 149/93  Pulse: 55  Resp: 24  Temp: 98.5 °F (36.9 °C)  Temp src: Oral  MAP (mmHg): (!) 108    Oxygen Therapy  SpO2: 99 %  O2 Device: None (Room air)        Physical Exam    Physical Exam  Vitals signs and nursing note reviewed.   General:  Patient laying  supine in the bed in no acute distress, patient is jaundiced  Head: Normocephalic and atraumatic.   HEENT:  Mucous membranes are moist.   Cardiovascular:  Normal rate and regular rhythm.  No Edema  Pulmonary:  Pulmonary effort is normal.  Normal breath sounds. No wheezing, rhonchi or rales.   Abdominal: Abdomen is soft there is right-sided hepatomegaly there is tenderness to palpation no guarding or rebound tenderness.  Skin: Warm and dry, moderate jaundice noted  Neurological: Awake alert, speech is normal        ED Course     Labs Reviewed   COMP METABOLIC PANEL (14) - Abnormal; Notable for the following components:       Result Value    Glucose 145 (*)     Creatinine 0.64 (*)      (*)      (*)     Alkaline Phosphatase 498 (*)     Bilirubin, Total 6.7 (*)     All other components within normal limits   CBC WITH DIFFERENTIAL WITH PLATELET - Abnormal; Notable for the following components:    RBC 3.95 (*)     HGB 11.9 (*)     HCT 36.6 (*)     Lymphocyte Absolute 0.69 (*)     All other components within normal limits   URINALYSIS WITH CULTURE REFLEX - Abnormal; Notable for the following components:    Spec Gravity >1.030 (*)     All other components within normal limits   ICTOTEST - Abnormal; Notable for the following components:    Ictotest Positive (*)     All other components within normal limits   LIPASE - Normal   RAINBOW DRAW LAVENDER   RAINBOW DRAW LIGHT GREEN   RAINBOW DRAW BLUE   RAINBOW DRAW GOLD     EKG    Rate, intervals and axes as noted on EKG Report.  Rate: 66  Rhythm: Sinus Rhythm  Reading: No acute ischemic changes        MDM      This is a 53-year-old gentleman history of metastatic esophageal cancer here for evaluation of abdominal discomfort shortness of breath.  Did have a liver biopsy last week, pain started previously.  Shortness of breath however did start a few days ago.  Differential includes acute pulmonary embolism pneumonia, biliary obstruction secondary to metastatic burden  in the liver, bleeding.  Patient is afebrile hemodynamically stable no reported fevers at home white count is normal, transaminitis with elevated bilirubin noted.  CT abdomen pelvis was performed shows increasing metastatic burden, biliary obstruction, some question of possible aspiration pneumonia.  Patient was covered with Zosyn, will admit for further monitoring evaluation Case endorsed to [facility] hospitalist who agrees with admission, discussed with Dr. Cardona from GI patient will be n.p.o. after midnight  Admission disposition: 9/15/2024  3:14 PM        Medical Decision Making      Disposition and Plan     Clinical Impression:  1. Transaminitis    2. Biliary obstruction (HCC)    3. Hyperbilirubinemia         Disposition:  Admit  9/15/2024  3:14 pm      Hospital Problems       Present on Admission  Date Reviewed: 8/14/2024            ICD-10-CM Noted POA    * (Principal) Transaminitis R74.01 9/15/2024 Unknown    Biliary obstruction (HCC) K83.1 9/15/2024 Unknown    Hyperbilirubinemia E80.6 9/15/2024 Unknown           Signed by Vishal Harrison MD on 9/15/2024  5:28 PM         MEDICATIONS ADMINISTERED IN LAST 1 DAY:  furosemide (Lasix) tab 20 mg       Date Action Dose Route User    9/16/2024 0815 Given 20 mg Oral Agata Godinez RN          HYDROmorphone (Dilaudid) 1 MG/ML injection 0.5 mg       Date Action Dose Route User    9/15/2024 1536 Given 0.5 mg Intravenous David Garcia RN    9/15/2024 1453 Given 0.5 mg Intravenous David Garcia RN    9/15/2024 1352 Given 0.5 mg Intravenous David Garcia RN    9/15/2024 1258 Given 0.5 mg Intravenous Geovani Pinzon RN          HYDROmorphone (Dilaudid) 1 MG/ML injection 0.5 mg       Date Action Dose Route User    9/15/2024 1705 Given 0.5 mg Intravenous Mariel Woo RN    9/15/2024 1629 Given 0.5 mg Intravenous Mariel Woo RN          HYDROmorphone (Dilaudid) 1 MG/ML injection       Date Action Dose Route User    9/15/2024 1629 Given 0.5 mg Intravenous  Mariel Woo RN          HYDROmorphone (Dilaudid) 1 MG/ML injection 0.4 mg       Date Action Dose Route User    9/15/2024 1758 Given 0.4 mg Intravenous Estrellita Gorman RN          HYDROmorphone (Dilaudid) 1 MG/ML injection 0.8 mg       Date Action Dose Route User    9/16/2024 0819 Given 0.8 mg Intravenous Agata Godinez RN    9/16/2024 0601 Given 0.8 mg Intravenous Luly Mesa RN    9/16/2024 0358 Given 0.8 mg Intravenous Adilene Zelaya RN    9/16/2024 0151 Given 0.8 mg Intravenous Adilene Zelaya RN    9/15/2024 2303 Given 0.8 mg Intravenous Adilene Zelaya RN    9/15/2024 2031 Given 0.8 mg Intravenous Adilene Zelaya RN          iopamidol 76% (ISOVUE-370) injection for power injector       Date Action Dose Route User    9/15/2024 1450 Given 100 mL Intravenous Marry Evangelista          losartan (Cozaar) tab 50 mg       Date Action Dose Route User    9/16/2024 0815 Given 50 mg Oral Agata Godinez RN          magnesium oxide (Mag-Ox) tab 400 mg       Date Action Dose Route User    9/16/2024 0815 Given 400 mg Oral Agata Godinez RN          melatonin tab 3 mg       Date Action Dose Route User    9/15/2024 2033 Given 3 mg Oral Adilene Zelaya RN          metoprolol tartrate (Lopressor) tab 25 mg       Date Action Dose Route User    9/16/2024 0648 Given 25 mg Oral Adilene Zelaya RN    9/15/2024 1850 Given 25 mg Oral Estrellita Gorman RN          OLANZapine (ZyPREXA) tab 5 mg       Date Action Dose Route User    9/15/2024 2032 Given 5 mg Oral Adilene Zelaya RN          oxyCODONE immediate release tab 5 mg       Date Action Dose Route User    9/16/2024 0650 Given 5 mg Oral Adilene Zelaya RN    9/16/2024 0017 Given 5 mg Oral Adilene Zelaya RN    9/15/2024 1850 Given 5 mg Oral Estrellita Gorman RN          pantoprazole (Protonix) DR tab 40 mg       Date Action Dose Route User    9/16/2024 0648 Given 40 mg Oral Adilene Zelaya, RN          piperacillin-tazobactam (Zosyn) 4.5 g in dextrose 5% 100 mL IVPB-ADDV       Date Action  Dose Route User    9/15/2024 1527 New Bag 4.5 g Intravenous David Garcia, RN          pregabalin (Lyrica) cap 25 mg       Date Action Dose Route User    9/15/2024 2032 Given 25 mg Oral Adilene Zelaya, RN          sertraline (Zoloft) tab 200 mg       Date Action Dose Route User    9/16/2024 0815 Given 200 mg Oral Agata Godinez, DANY          sodium chloride 0.9% infusion       Date Action Dose Route User    9/15/2024 1751 New Bag (none) Intravenous Estrellita Gorman RN          sodium chloride 0.9 % IV bolus 500 mL       Date Action Dose Route User    9/15/2024 1258 New Bag 500 mL Intravenous Geovani Pinzon RN            Vitals (last day)       Date/Time Temp Pulse Resp BP SpO2 Weight O2 Device O2 Flow Rate (L/min) [facility]    09/16/24 0338 98.7 °F (37.1 °C) 50 15 115/67 98 % -- None (Room air) --     09/15/24 2034 97.4 °F (36.3 °C) 53 20 155/82 100 % -- None (Room air) -- EK    09/15/24 1733 -- -- -- -- -- 155 lb (70.3 kg) -- -- AF    09/15/24 1730 -- 57 -- 168/89 -- -- -- -- AF    09/15/24 1700 -- 55 24 149/93 99 % -- -- -- RB    09/15/24 1645 -- 56 14 -- 100 % -- -- -- RB    09/15/24 1630 -- 52 17 145/89 100 % -- -- -- RB    09/15/24 1615 -- 51 13 -- 100 % -- -- -- RB    09/15/24 1600 -- 51 19 -- 100 % -- -- -- RB    09/15/24 1545 -- 52 17 138/96 100 % -- None (Room air) -- RB    09/15/24 1530 -- 54 24 121/84 100 % -- -- -- RB    09/15/24 1330 -- 55 17 121/76 100 % -- None (Room air) -- JS    09/15/24 1211 -- -- -- -- -- -- None (Room air) --     09/15/24 1207 98.5 °F (36.9 °C) 64 19 113/78 100 % 155 lb (70.3 kg) None (Room air) --        9/15/2024 H&P    Chief Complaint: Abdominal pain        Subjective:  History of Present Illness:      Dontae Goodwin Diego Champion is a 53 year old male with a PMH Of metastatic esophageal and pancreatic cancer who presents with abdominal pain and yellowing of skin.  They have noticed symptoms worsening over the past couple of days.  Denies any n/v, no fevers, no diarrhea.  No other  acute complaints.         Physical Exam:    /76   Pulse 55   Temp 98.5 °F (36.9 °C) (Oral)   Resp 17   Ht 6' 2\" (1.88 m)   Wt 155 lb (70.3 kg)   SpO2 100%   BMI 19.90 kg/m²   General: No acute distress, Alert, jaundiced  Respiratory: No rhonchi, no wheezes  Cardiovascular: S1, S2. Regular rate and rhythm  Abdomen: Soft, Non-tender, non-distended, positive bowel sounds  Neuro: No new focal deficits  Extremities: No edema        Lab 09/15/24  1225   RBC 3.95*   HGB 11.9*   HCT 36.6*   MCV 92.7   MCH 30.1   MCHC 32.5   RDW 16.2   NEPRELIM 4.66   WBC 6.4   .0             Recent Labs   Lab 09/15/24  1225   *   BUN 9   CREATSERUM 0.64*   EGFRCR 112   CA 8.9   ALB 3.3      K 4.1      CO2 29.0   ALKPHO 498*   *   *   BILT 6.7*   TP 6.0       Assessment & Plan:  #Abdominal pain  #Jaundice - likely obstructive   #Elevated liver enzymes  Could be obstruction given his CT showing size progression of liver mass  Pain control, anti-emetics   GI consult     #Metastatic adenocarcinoma  Had liver biopsy done on 9/10/24 - results reviewed     # Metastatic esophageal carcinoma  # Metastatic pancreatic carcinoma  s/p esophagectomy and 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 and ASD occluder device   CT abd showing pancreatic mass that progressed in size, progressed hepatic metastasis   Hem/onc consult      #Previous PE/DVT  Resume eliquis, will hold for tonight in case for procedure     #Essential HTN - losartan, metoprolol, furosemide   #DLD  #GERD/PUD - PPI  #CAD s/p CABG  #Depression/Anxiety - Zoloft, Olanzapine            Plan of care discussed with patient, er physician, family, nurse      Jan Brito MD       9/16/2024 Hematology/Oncology Consult   Reason for Consult: Metastatic esophageal cancer  Medical Record Number: [id number]      CSN: [id number]              Referring Physician: No ref. provider found  PCP: Adrian Horowitz MD     History of Present Illness:  53M with a PMH of metastatic esophageal cancer presented on 9/15/24 with jaundice and abdominal pain.      Oncology History   -2018: Per report had a normal EGD and colonoscopy     -June 2022: He met with GI due to new onset dysphagia which started about 1 month prior to his visit.  He had an esophagram with a focal abrupt narrowing with irregular margins in the distal one third of the esophagus extending to the GE junction.  He also had an episode of food impaction. He has also lost about 15 lb. He was a heavy smoker from age 14 to about 40 (1.5 PPD). Also with alcohol use currently. Mother with a history of breast and colon cancer.  EGD and EUS with Dr. Yadav on 6/7/2022: Severe esophagitis with a concerning mass extending 37-39 centimeters from the incisors.  Nonobstructive mass.  By EUS uT3N1 disease.  Pathology showed invasive moderate to poorly differentiated adenocarcinoma.  HER2 amplified by FISH. CT CAP [facility] on 6/16/22: Results not loaded to PACs yet.  CA 19-9 from the same day was 107.4.  CEA normal. PET/CT on 6/20/2022: Increased distal esophageal uptake with an SUV of 14.4.  Concern for shreya metastases.     -Concurrent chemoradiation with carboplatin AUC2 plus paclitaxel 50 mg/m2: July 2022-August 2022 with  down (280-->30). Post treatment PET/CT showed improvement.      -Surgery with Dr. Lu on 9/30/22: ypT1b pN0. Recovery has been complicated by an esophageal leak,      -I met with him on 12/12/22. We discussed adjuvant opdivo for 1 year. His  was elevated to 48 and repeat was 64. CT CAP was recommended.      -He was admitted on 12/25/22 for abdominal pain. He had a POEM procedure done. He was also noted to have a RLL consolidation. He was seen by pulmonary, based on imaging an outpatient PET/CT was recommended and a biopsy of the most FDG avid lesion would be considered. Noted to have CAD and an outpatient evaluation was recommended. Outpatient PET/CT done on  1/4/23 was reviewed in tumor board and there was no focal area of concern and adjuvant immunotherapy recommended.      -Adjuvant opdivo:01/2023-03/2023. PET/CT in in 04/2023 showed uptake in the pancreatic head and tail. Also with some uptake in liver. EGD/EUS on 4/27/23 with Dr. Ritchie: Pancreatic head mass positive for adenocarcinoma: Comparison to previous esophageal cancer shows similarity but IHC in non-specific. Her 2 IHC was 2+ on this specimen but FISH was negative.  Given discordant results on initial HER2 and follow-up HER2 we decided to proceed with chemotherapy with immunotherapy with HER2 directed therapy.     -Chemo + Herceptin + Immunotherapy: He received 7 cycles of FOLFOX + Herceptin + Immunotherapy (05/2023-09/2023). Imaging after C5 showed a favorable response. After C7, we held oxaliplatin due to neuropathy and he was started on maintenance herceptin + IO maintenance. Signatera was negative 09/11/23.     -Herceptin + IO Maintenance: He received 3 cycles from 09/25/23-10/31/23. Treatment was delayed due to hospitalizations.      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 Due to new findings-restarting FOLFOX discussed.      -admitted from 1/8/24-1/13/24 for persistent dysphagia and bronchesophageal stent.      -1/17/24: EGD with fistula closure with ASD occluder and removal of the bronchial stent.     -FOLFOX + Herceptin + Opdivo restarted: 3 cycles: 02/2024-03/2024     -Treatment delayed again for recurrent hospital admissions     -He underwent a Latissimus dorsi flap reconstruction for his bronchoesophageal fistula on 5/1/24     -PET/CT 5/15/24: at least 3 areas of uptake  in the right hepatic lobe, uptake in pancrease and retroperitoneum. Post-operative uptake in chest wall.      -FOLFOX + Herceptin + Nivolumab Restarted: 6/11/2024 until 8/14/2024.  He received 5 cycles.  A PET scan after 5 cycles showed overall progression of metastatic disease with new gastrohepatic and peripancreatic lymph nodes along with enlargement of pancreatic and hepatic metastases.  There were also new lung nodules.  A CT-guided liver biopsy on 9/10/2024 was done which was positive for metastatic adenocarcinoma and the immunoprofile is compatible with the patient's esophageal primary.     -He was admitted on 9/15/24 for abdominal pain.  Labs showed elevated AST/ALT and T bili (6.7). CTA CAP showed progression of pancreatic and hepatic masses. There is intra-extra hepatic biliary ductal dilation. Subpleural reticular opacities noted-inflammatory or interstitial process? Hi Res CT recommended.      Physical Exam  /67 (BP Location: Right arm)   Pulse 50   Temp 98.7 °F (37.1 °C) (Oral)   Resp 15   Ht 1.88 m (6' 2\")   Wt 70.3 kg (155 lb)   SpO2 98%   BMI 19.90 kg/m²    General: NAD, AOX3, jaundiced  HEENT: clear op, mmm, no jvd. EOM intact, positive scleral icterus   CV: RRR S1S2 no murmurs  Extremities: No edema  Lungs: CTAB, no increased work of breathing  Abd: soft nt nd +BS no hepatosplenomegaly, surgical scars  Neuro: CN: II-XII grossly intact  Psych: Normal Mood and affect      Component Value Date     WBC 6.4 09/15/2024     HGB 11.9 (L) 09/15/2024     HCT 36.6 (L) 09/15/2024     Assessment and Plan:  Jaundice 2/2 elevated bilirubin which is likely related to metastatic disease  -CT with ductal dilation.  GI consult is pending.  Will likely need an MRCP plus minus ERCP.  Bilirubin levels will impact his chemotherapy choices in the future     Metastatic esophageal cancer  -Most recent imaging is consistent with metastatic disease progression.  A repeat HER2 on his liver biopsy is pending.  If  HER2 positive we will proceed with Enhertu.  If HER2 negative we will consider FOLFIRI plus Cyramza versus paclitaxel and Cyramza     PE/RLE DVT: Dx 6/18/24: on eliquis indefinitely. Currently on hold for possible ERCP     [facility]    Order Name   HYDROmorphone (Dilaudid) 1 MG/ML injection 0.5 mg  Start: 09/15/24 1236   sodium chloride 0.9 % IV bolus 500 mL  Start: 09/15/24 1237   iopamidol 76% (ISOVUE-370) injection for power injector  Start: 09/15/24 1450   piperacillin-tazobactam (Zosyn) 4.5 g in dextrose 5% 100 mL IVPB-ADDV  Start: 09/15/24 1513   HYDROmorphone (Dilaudid) 1 MG/ML injection 0.5 mg  Start: 09/15/24 1626   HYDROmorphone (Dilaudid) 1 MG/ML injection  Start: 09/15/24 1627 -- Cabinet Override   HYDROmorphone (Dilaudid) 1 MG/ML injection 0.4 mg  Start: 09/15/24 1732   HYDROmorphone (Dilaudid) 1 MG/ML injection 0.8 mg  Start: 09/15/24 1732     sodium chloride 0.9% infusion   Rate: 100 mL/hr    Start: 09/15/24 1747

## 2024-09-16 NOTE — PLAN OF CARE
Pt received alert and oriented x4. VSS. No sob on RA. Afebrile. C/o pain to abdomen and back. BP elevated. IVF infusing.  NPO at midnight for ERCP. All meds given per MAR. Up and using the bathroom. Tolerating diet. Fall precautions in place, call light within reach.     Problem: PAIN - ADULT  Goal: Verbalizes/displays adequate comfort level or patient's stated pain goal  Description: INTERVENTIONS:  - Encourage pt to monitor pain and request assistance  - Assess pain using appropriate pain scale  - Administer analgesics based on type and severity of pain and evaluate response  - Implement non-pharmacological measures as appropriate and evaluate response  - Consider cultural and social influences on pain and pain management  - Manage/alleviate anxiety  - Utilize distraction and/or relaxation techniques  - Monitor for opioid side effects  - Notify MD/LIP if interventions unsuccessful or patient reports new pain  - Anticipate increased pain with activity and pre-medicate as appropriate  Outcome: Progressing

## 2024-09-16 NOTE — PLAN OF CARE
Patient alert and oriented x 4, lungs diminished, room air, abdomen soft, bowel sounds present, voiding adequate amounts, VSS, afebrile, medicated for complaints of abdominal pain throughout shift, 0015 NPO for procedure.

## 2024-09-16 NOTE — CONSULTS
[facility] Consult Note    Reason for Consult: Metastatic esophageal cancer  Medical Record Number: [id number]   CSN: [id number]   Referring Physician: No ref. provider found  PCP: Adrian Horowitz MD    History of Present Illness: 53M with a PMH of metastatic esophageal cancer presented on 9/15/24 with jaundice and abdominal pain.     Oncology History   -2018: Per report had a normal EGD and colonoscopy     -June 2022: He met with GI due to new onset dysphagia which started about 1 month prior to his visit.  He had an esophagram with a focal abrupt narrowing with irregular margins in the distal one third of the esophagus extending to the GE junction.  He also had an episode of food impaction. He has also lost about 15 lb. He was a heavy smoker from age 14 to about 40 (1.5 PPD). Also with alcohol use currently. Mother with a history of breast and colon cancer.  EGD and EUS with Dr. Yadav on 6/7/2022: Severe esophagitis with a concerning mass extending 37-39 centimeters from the incisors.  Nonobstructive mass.  By EUS uT3N1 disease.  Pathology showed invasive moderate to poorly differentiated adenocarcinoma.  HER2 amplified by FISH. CT CAP done at [facility] on 6/16/22: Results not loaded to PACs yet.  CA 19-9 from the same day was 107.4.  CEA normal. PET/CT on 6/20/2022: Increased distal esophageal uptake with an SUV of 14.4.  Concern for shreya metastases.     -Concurrent chemoradiation with carboplatin AUC2 plus paclitaxel 50 mg/m2: July 2022-August 2022 with  down (280-->30). Post treatment PET/CT showed improvement.      -Surgery with Dr. Lu on 9/30/22: ypT1b pN0. Recovery has been complicated by an esophageal leak,      -I met with him on 12/12/22. We discussed adjuvant opdivo for 1 year. His  was elevated to 48 and repeat was 64. CT CAP was recommended.      -He was admitted on 12/25/22 for abdominal pain. He had a POEM procedure done. He was also noted to have a RLL  consolidation. He was seen by pulmonary, based on imaging an outpatient PET/CT was recommended and a biopsy of the most FDG avid lesion would be considered. Noted to have CAD and an outpatient evaluation was recommended. Outpatient PET/CT done on 1/4/23 was reviewed in tumor board and there was no focal area of concern and adjuvant immunotherapy recommended.      -Adjuvant opdivo:01/2023-03/2023. PET/CT in in 04/2023 showed uptake in the pancreatic head and tail. Also with some uptake in liver. EGD/EUS on 4/27/23 with Dr. Ritchie: Pancreatic head mass positive for adenocarcinoma: Comparison to previous esophageal cancer shows similarity but IHC in non-specific. Her 2 IHC was 2+ on this specimen but FISH was negative.  Given discordant results on initial HER2 and follow-up HER2 we decided to proceed with chemotherapy with immunotherapy with HER2 directed therapy.     -Chemo + Herceptin + Immunotherapy: He received 7 cycles of FOLFOX + Herceptin + Immunotherapy (05/2023-09/2023). Imaging after C5 showed a favorable response. After C7, we held oxaliplatin due to neuropathy and he was started on maintenance herceptin + IO maintenance. Signatera was negative 09/11/23.     -Herceptin + IO Maintenance: He received 3 cycles from 09/25/23-10/31/23. Treatment was delayed due to hospitalizations.      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 Due to new findings-restarting FOLFOX discussed.      -admitted from 1/8/24-1/13/24 for persistent dysphagia and bronchesophageal stent.      -1/17/24: EGD with fistula closure with ASD occluder and removal of the bronchial stent.     -FOLFOX + Herceptin + Opdivo  restarted: 3 cycles: 02/2024-03/2024     -Treatment delayed again for recurrent hospital admissions     -He underwent a Latissimus dorsi flap reconstruction for his bronchoesophageal fistula on 5/1/24     -PET/CT 5/15/24: at least 3 areas of uptake in the right hepatic lobe, uptake in pancrease and retroperitoneum. Post-operative uptake in chest wall.      -FOLFOX + Herceptin + Nivolumab Restarted: 6/11/2024 until 8/14/2024.  He received 5 cycles.  A PET scan after 5 cycles showed overall progression of metastatic disease with new gastrohepatic and peripancreatic lymph nodes along with enlargement of pancreatic and hepatic metastases.  There were also new lung nodules.  A CT-guided liver biopsy on 9/10/2024 was done which was positive for metastatic adenocarcinoma and the immunoprofile is compatible with the patient's esophageal primary.    -He was admitted on 9/15/24 for abdominal pain.  Labs showed elevated AST/ALT and T bili (6.7). CTA CAP showed progression of pancreatic and hepatic masses. There is intra-extra hepatic biliary ductal dilation. Subpleural reticular opacities noted-inflammatory or interstitial process? Hi Res CT recommended.     Review of Systems: 12 Point ROS was completed and pertinent positives are in the HPI    Medications:    Current Facility-Administered Medications:     [Held by provider] apixaban (Eliquis) tab 5 mg, 5 mg, Oral, BID    dicyclomine (Bentyl) cap 10 mg, 10 mg, Oral, Q6H PRN    sertraline (Zoloft) tab 200 mg, 200 mg, Oral, Daily    pregabalin (Lyrica) cap 25 mg, 25 mg, Oral, Nightly    pantoprazole (Protonix) DR tab 40 mg, 40 mg, Oral, QAM AC    oxyCODONE immediate release tab 5 mg, 5 mg, Oral, Q4H PRN    ondansetron (Zofran-ODT) disintegrating tab 8 mg, 8 mg, Oral, Q8H PRN    OLANZapine (ZyPREXA) tab 5 mg, 5 mg, Oral, Nightly    metoprolol tartrate (Lopressor) tab 25 mg, 25 mg, Oral, 2x Daily(Beta Blocker)    furosemide (Lasix) tab 20 mg, 20 mg, Oral, Daily    losartan  (Cozaar) tab 50 mg, 50 mg, Oral, Daily    acetaminophen (Tylenol Extra Strength) tab 500 mg, 500 mg, Oral, Q4H PRN    melatonin tab 3 mg, 3 mg, Oral, Nightly PRN    glycerin-hypromellose- (Artificial Tears) 0.2-0.2-1 % ophthalmic solution 1 drop, 1 drop, Both Eyes, QID PRN    sodium chloride (Saline Mist) 0.65 % nasal solution 1 spray, 1 spray, Each Nare, Q3H PRN    sodium chloride 0.9% infusion, , Intravenous, Continuous    polyethylene glycol (PEG 3350) (Miralax) 17 g oral packet 17 g, 17 g, Oral, Daily PRN    sennosides (Senokot) tab 17.2 mg, 17.2 mg, Oral, Nightly PRN    bisacodyl (Dulcolax) 10 MG rectal suppository 10 mg, 10 mg, Rectal, Daily PRN    fleet enema (Fleet) rectal enema 133 mL, 1 enema, Rectal, Once PRN    ondansetron (Zofran) 4 MG/2ML injection 4 mg, 4 mg, Intravenous, Q6H PRN    prochlorperazine (Compazine) 10 MG/2ML injection 5 mg, 5 mg, Intravenous, Q8H PRN    HYDROmorphone (Dilaudid) 1 MG/ML injection 0.2 mg, 0.2 mg, Intravenous, Q2H PRN **OR** HYDROmorphone (Dilaudid) 1 MG/ML injection 0.4 mg, 0.4 mg, Intravenous, Q2H PRN **OR** HYDROmorphone (Dilaudid) 1 MG/ML injection 0.8 mg, 0.8 mg, Intravenous, Q2H PRN    Past Medical History:    Back problem    Belching    Black stools    Borderline diabetes    Dx in 8/2013 - HgA1C 6.2%    C. difficile diarrhea    pt treated and without symptoms    Chest pain    Coronary artery disease    On 8/16/13: CABG x 4 with LIMA to LAD and SVG to diagonal, OM and PDA    Decorative tattoo    Depression    Difficult intubation    h/o esophagectomy for CA; developed esophagobronchial vistula    Disorder of liver    LIVER CA    Esophageal cancer (HCC)    completed chemo    Esophageal reflux    Essential hypertension    Exposure to medical diagnostic radiation    last tx 8/18/2022    Frequent urination    Gastroparesis    Heartburn    High blood pressure    High cholesterol    Found when I had quadruple bypass    History of COVID-19    asymptomatic - pt was dx  during a hospitalization for another diagnosis. No continued symptoms    History of stomach ulcers    Hyperlipidemia    Hyperlipidemia LDL goal < 70    Indigestion    Morbid obesity with BMI of 40.0-44.9, adult (HCC)    Muscle weakness    Nausea vomiting and diarrhea    Nontoxic multinodular goiter    Dx in 2013: pt was told that imaging showed thyroid cysts per PCP    Pancreatic cancer (HCC)    last dose 2023 is scheduled for another round 23    Peripheral vascular disease (HCC)    pt denies    Personal history of antineoplastic chemotherapy    for esophageal cancer/completed    Personal history of antineoplastic chemotherapy    pancreatic cancer    Personal history of antineoplastic chemotherapy    Last treatment 3/12/24    Problems with swallowing    Pulmonary nodules    Dx in 2013: CT chest showed small bilateral fissural-based lung nodules less than 1 cm    S/P CABG x 4    On 13: CABG x 4 with LIMA to LAD and SVG to diagonal, OM and PDA    Shortness of breath    when coughing; no oxygen    Vomiting     Past Surgical History:   Procedure Laterality Date    Appendectomy      Appendectomy      Arthroscopy of joint unlisted      right shoulder    Cabg      On 13: CABG x 4 with LIMA to LAD and SVG to diagonal, OM and PDA    Cardiac cath lab      On 2013: cardiac cath showed 3-vessel disease    Other surgical history      1.       Laparoscopic robotic-assisted esophagogastrectomy.    Port, indwelling, imp       Social History     Socioeconomic History    Marital status:     Number of children: 3   Occupational History    Occupation: works as  - on workman's comp   Tobacco Use    Smoking status: Former     Current packs/day: 0.00     Average packs/day: 1 pack/day for 27.0 years (27.0 ttl pk-yrs)     Types: Cigarettes     Start date: 8/15/1986     Quit date: 8/15/2013     Years since quittin.0    Smokeless tobacco: Never    Tobacco comments:     Quit smoking     Vaping Use    Vaping status: Never Used   Substance and Sexual Activity    Alcohol use: Never    Drug use: Never    Sexual activity: Not Currently     Partners: Female      Family History   Problem Relation Age of Onset    Cancer Mother         breast and colon     Diabetes Neg        Physical Exam  /67 (BP Location: Right arm)   Pulse 50   Temp 98.7 °F (37.1 °C) (Oral)   Resp 15   Ht 1.88 m (6' 2\")   Wt 70.3 kg (155 lb)   SpO2 98%   BMI 19.90 kg/m²    General: NAD, AOX3, jaundiced  HEENT: clear op, mmm, no jvd. EOM intact, positive scleral icterus   CV: RRR S1S2 no murmurs  Extremities: No edema  Lungs: CTAB, no increased work of breathing  Abd: soft nt nd +BS no hepatosplenomegaly, surgical scars  Neuro: CN: II-XII grossly intact  Psych: Normal Mood and affect     Results:  Lab Results   Component Value Date    WBC 6.4 09/15/2024    HGB 11.9 (L) 09/15/2024    HCT 36.6 (L) 09/15/2024    MCV 92.7 09/15/2024    .0 09/15/2024    EOSABS 0.38 04/20/2024     Lab Results   Component Value Date     09/15/2024    K 4.1 09/15/2024    CO2 29.0 09/15/2024     09/15/2024    BUN 9 09/15/2024    PHOS 2.9 03/15/2024    ALB 3.3 09/15/2024       Radiology: Reviewed    Assessment and Plan:  Jaundice 2/2 elevated bilirubin which is likely related to metastatic disease  -CT with ductal dilation.  GI consult is pending.  Will likely need an MRCP plus minus ERCP.  Bilirubin levels will impact his chemotherapy choices in the future    Metastatic esophageal cancer  -Most recent imaging is consistent with metastatic disease progression.  A repeat HER2 on his liver biopsy is pending.  If HER2 positive we will proceed with Enhertu.  If HER2 negative we will consider FOLFIRI plus Cyramza versus paclitaxel and Cyramza    PE/RLE DVT: Dx 6/18/24: on eliquis indefinitely. Currently on hold for possible ERCP    [facility]

## 2024-09-16 NOTE — PROGRESS NOTES
[facility]   part of [facility]     Hospitalist Progress Note     Dontae Cortez . Patient Status:  Inpatient    10/15/1970 MRN [id number]   Former[facility] 4NW-A Attending Jan Brito MD   Hosp Day # 1 PCP Adrian Horowitz MD     Chief Complaint: Abdominal pain    Subjective:     Patient is feeling better, still with abdominal pain but controlled.  Denies any n/v, no fevers, no other acute complaints.     Objective:    Review of Systems:   A comprehensive review of systems was completed; pertinent positive and negatives stated in subjective.    Vital signs:  Temp:  [97.4 °F (36.3 °C)-98.7 °F (37.1 °C)] 98.7 °F (37.1 °C)  Pulse:  [50-64] 50  Resp:  [13-24] 15  BP: (113-168)/(67-96) 115/67  SpO2:  [98 %-100 %] 98 %    Physical Exam:    General: No acute distress, pleasant   Respiratory: No wheezes, no rhonchi  Cardiovascular: S1, S2, regular rate and rhythm  Abdomen: Soft, Non-tender, non-distended, positive bowel sounds  Neuro: No new focal deficits.   Extremities: No edema    Diagnostic Data:    Labs:  Recent Labs   Lab 09/10/24  0921 09/15/24  1225 24  0653   WBC  --  6.4 5.1   HGB  --  11.9* 10.7*   MCV  --  92.7 93.4   PLT  --  205.0 169.0   INR 1.17  --   --        Recent Labs   Lab 09/15/24  1225 24  0653   * 96   BUN 9 9   CREATSERUM 0.64* 0.53*   CA 8.9 8.7   ALB 3.3 3.0*    137   K 4.1 4.1    104   CO2 29.0 30.0   ALKPHO 498* 465*   * 209*   * 358*   BILT 6.7* 6.7*   TP 6.0 5.4*       Estimated Creatinine Clearance: 158.4 mL/min (A) (based on SCr of 0.53 mg/dL (L)).    No results for input(s): \"TROP\", \"TROPHS\", \"CK\" in the last 168 hours.    Recent Labs   Lab 09/10/24  0921   PTP 15.0*   INR 1.17                  Microbiology    No results found for this visit on 09/15/24.      Imaging: Reviewed in Epic.    Medications:    magnesium oxide  400 mg Oral Once    [Held by provider] apixaban  5 mg Oral BID    sertraline  200 mg Oral Daily     pregabalin  25 mg Oral Nightly    pantoprazole  40 mg Oral QAM AC    OLANZapine  5 mg Oral Nightly    metoprolol tartrate  25 mg Oral 2x Daily(Beta Blocker)    furosemide  20 mg Oral Daily    losartan  50 mg Oral Daily       Assessment & Plan:      #Abdominal pain  #Jaundice - likely obstructive   #Elevated liver enzymes  Could be obstruction given his CT showing size progression of liver mass  Pain control, anti-emetics   GI consult - ERCP?     #Metastatic adenocarcinoma  Had liver biopsy done on 9/10/24 - results reviewed     # Metastatic esophageal carcinoma  # Metastatic pancreatic carcinoma  s/p esophagectomy and 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 and ASD occluder device   CT abd showing pancreatic mass that progressed in size, progressed hepatic metastasis   Hem/onc consult - awaiting HER2 biopsy report      #Previous PE/DVT  Eliquis - on hold for possible ERCP, if not getting done today will initiate heparin drip while awaiting to resume eliquis     #Essential HTN - losartan, metoprolol, furosemide   #DLD  #GERD/PUD - PPI  #CAD s/p CABG  #Depression/Anxiety - Zoloft, Olanzapine       Jan Brito MD    Supplementary Documentation:     Quality:  DVT Mechanical Prophylaxis:     Early ambuation  DVT Pharmacologic Prophylaxis   Medication    [Held by provider] apixaban (Eliquis) tab 5 mg                Code Status: Full Code  Neumann: No urinary catheter in place  Neumann Duration (in days):   Central line:    SHUBHAM:     Discharge is dependent on: pain improvement   At this point Mr. Cortez is expected to be discharge to: Home    The 21st Century Cures Act makes medical notes like these available to patients in the interest of transparency. Please be advised this is a medical document. Medical documents are intended to carry relevant information, facts as evident, and the clinical opinion of the practitioner. The medical note is intended as peer to peer communication and may appear blunt or  direct. It is written in medical language and may contain abbreviations or verbiage that are unfamiliar.

## 2024-09-16 NOTE — CONSULTS
[facility]                       [facility]    Dontae Buddy Ortegacristina Arechiga. Patient Status:  Inpatient    10/15/1970 MRN [id number]   Location [facility] 4NW-A Attending Jan Brito MD   Hosp Day # 1 PCP Adrian Horowitz MD     Reason for consultation: Elevated LFTs  HPI: This is a 53 yr-old male with an extensive PMhx that includes CAD s/p CABG, dyslipidemia, C Diff, and distal esophageal adenocarcinoma (dx 2022) s/p chemoRT followed by laparoscopic robotic assisted esophagogastrectomy (2022; Dr Lu) recovery c/b anastomotic leak requiring endoscopic closure with stenting with course further c/b GOO s/p EGD with stenting + Botox. Pt noted to have pancreatic head mass with EUS revealing mod/poorly differentiated carcinoma (2023) treated with chemo + immunotherapy.   Last  pt with numerous admissions for cough/recurrent PNA found to have severe ulceration at esophagogastric anastomosis with a bronchial fistula tract opening requiring numerous endoscopic procedures to close this fistula however pt eventually required Latissimus dorsi flap reconstruction (2024; [facility]). Pt was noted to have new lymph adenopathy, new liver + pancreatic lesions and re-started chemo (2024; last dose ~3 weeks ago; followed by Dr Foster).   Pt was admitted yesterday with persistent RUQ abd pain which intensified over the last week. Over the last few days pt also noted darkening urine, jaundice, and yesterday pruritis. No fevers or chills. No change in chronic diarrhea apart from lighter colored stools. No melena or hematochezia. Pt is on Eliquis--last dose 9/15 AM and he denies any recent DVT/PE. He reports tolerating PO intake without overt dysphagia and denies odynophagia or heartburn.   CTA C + A/P IV suggested progressive hepatic metastatic disease with new intra/extrahepatic biliary ductal dilation; labs with rising LFTs (  ALK PHOS 498 Bili 6.7) from normal  bili levels on 8/14/24 with mild ALT + Alk Phos elevations (ALT 79 Alk Phos 128), normal lipase (19), normal WBC (5.1), and stable anemia (Hgb 10.7) with normal plt levels 205  PMHx:   Past Medical History:    Back problem    Belching    Black stools    Borderline diabetes    Dx in 8/2013 - HgA1C 6.2%    C. difficile diarrhea    pt treated and without symptoms    Chest pain    Coronary artery disease    On 8/16/13: CABG x 4 with LIMA to LAD and SVG to diagonal, OM and PDA    Decorative tattoo    Depression    Difficult intubation    h/o esophagectomy for CA; developed esophagobronchial vistula    Disorder of liver    LIVER CA    Esophageal cancer (HCC)    completed chemo    Esophageal reflux    Essential hypertension    Exposure to medical diagnostic radiation    last tx 8/18/2022    Frequent urination    Gastroparesis    Heartburn    High blood pressure    High cholesterol    Found when I had quadruple bypass    History of COVID-19    asymptomatic - pt was dx during a hospitalization for another diagnosis. No continued symptoms    History of stomach ulcers    Hyperlipidemia    Hyperlipidemia LDL goal < 70    Indigestion    Morbid obesity with BMI of 40.0-44.9, adult (HCC)    Muscle weakness    Nausea vomiting and diarrhea    Nontoxic multinodular goiter    Dx in 8/2013: pt was told that imaging showed thyroid cysts per PCP    Pancreatic cancer (HCC)    last dose 12/4/2023 is scheduled for another round 12/27/23    Peripheral vascular disease (HCC)    pt denies    Personal history of antineoplastic chemotherapy    for esophageal cancer/completed    Personal history of antineoplastic chemotherapy    pancreatic cancer    Personal history of antineoplastic chemotherapy    Last treatment 3/12/24    Problems with swallowing    Pulmonary nodules    Dx in 8/2013: CT chest showed small bilateral fissural-based lung nodules less than 1 cm    S/P CABG x 4    On 8/16/13: CABG x 4 with LIMA to LAD and SVG to diagonal, OM and  PDA    Shortness of breath    when coughing; no oxygen    Vomiting                PSHx:   Past Surgical History:   Procedure Laterality Date    Appendectomy      Appendectomy      Arthroscopy of joint unlisted      right shoulder    Cabg      On 8/16/13: CABG x 4 with LIMA to LAD and SVG to diagonal, OM and PDA    Cardiac cath lab      On 8/14/2013: cardiac cath showed 3-vessel disease    Other surgical history      1.       Laparoscopic robotic-assisted esophagogastrectomy.    Port, indwelling, imp       Medications:    [COMPLETED] magnesium oxide (Mag-Ox) tab 400 mg  400 mg Oral Once    [COMPLETED] HYDROmorphone (Dilaudid) 1 MG/ML injection 0.5 mg  0.5 mg Intravenous Q30 Min PRN    [COMPLETED] sodium chloride 0.9 % IV bolus 500 mL  500 mL Intravenous Once    [COMPLETED] iopamidol 76% (ISOVUE-370) injection for power injector  100 mL Intravenous ONCE PRN    [COMPLETED] piperacillin-tazobactam (Zosyn) 4.5 g in dextrose 5% 100 mL IVPB-ADDV  4.5 g Intravenous Once    [Held by provider] apixaban (Eliquis) tab 5 mg  5 mg Oral BID    dicyclomine (Bentyl) cap 10 mg  10 mg Oral Q6H PRN    sertraline (Zoloft) tab 200 mg  200 mg Oral Daily    pregabalin (Lyrica) cap 25 mg  25 mg Oral Nightly    pantoprazole (Protonix) DR tab 40 mg  40 mg Oral QAM AC    oxyCODONE immediate release tab 5 mg  5 mg Oral Q4H PRN    ondansetron (Zofran-ODT) disintegrating tab 8 mg  8 mg Oral Q8H PRN    OLANZapine (ZyPREXA) tab 5 mg  5 mg Oral Nightly    metoprolol tartrate (Lopressor) tab 25 mg  25 mg Oral 2x Daily(Beta Blocker)    furosemide (Lasix) tab 20 mg  20 mg Oral Daily    losartan (Cozaar) tab 50 mg  50 mg Oral Daily    acetaminophen (Tylenol Extra Strength) tab 500 mg  500 mg Oral Q4H PRN    melatonin tab 3 mg  3 mg Oral Nightly PRN    glycerin-hypromellose- (Artificial Tears) 0.2-0.2-1 % ophthalmic solution 1 drop  1 drop Both Eyes QID PRN    sodium chloride (Saline Mist) 0.65 % nasal solution 1 spray  1 spray Each Nare Q3H PRN     sodium chloride 0.9% infusion   Intravenous Continuous    polyethylene glycol (PEG 3350) (Miralax) 17 g oral packet 17 g  17 g Oral Daily PRN    sennosides (Senokot) tab 17.2 mg  17.2 mg Oral Nightly PRN    bisacodyl (Dulcolax) 10 MG rectal suppository 10 mg  10 mg Rectal Daily PRN    fleet enema (Fleet) rectal enema 133 mL  1 enema Rectal Once PRN    ondansetron (Zofran) 4 MG/2ML injection 4 mg  4 mg Intravenous Q6H PRN    prochlorperazine (Compazine) 10 MG/2ML injection 5 mg  5 mg Intravenous Q8H PRN    HYDROmorphone (Dilaudid) 1 MG/ML injection 0.2 mg  0.2 mg Intravenous Q2H PRN    Or    HYDROmorphone (Dilaudid) 1 MG/ML injection 0.4 mg  0.4 mg Intravenous Q2H PRN    Or    HYDROmorphone (Dilaudid) 1 MG/ML injection 0.8 mg  0.8 mg Intravenous Q2H PRN     Allergies:   Allergies   Allergen Reactions    Oxaliplatin HIVES     Shaking      Social HX:   Social History     Socioeconomic History    Marital status:     Number of children: 3   Occupational History    Occupation: works as  - on workman's comp   Tobacco Use    Smoking status: Former     Current packs/day: 0.00     Average packs/day: 1 pack/day for 27.0 years (27.0 ttl pk-yrs)     Types: Cigarettes     Start date: 8/15/1986     Quit date: 8/15/2013     Years since quittin.0    Smokeless tobacco: Never    Tobacco comments:     Quit smoking    Vaping Use    Vaping status: Never Used   Substance and Sexual Activity    Alcohol use: Never    Drug use: Never    Sexual activity: Not Currently     Partners: Female   Other Topics Concern    Caffeine Concern Yes    Stress Concern No    Weight Concern No    Special Diet No    Exercise No    Seat Belt No   Social History Narrative    Lives with life partner    Has 3 daughters - 1 lives closeby, 1 in [place], 1 in [place]     Social Determinants of Health     Financial Resource Strain: Low Risk  (2024)    Received from [facility]    Overall Financial Resource Strain ([facility])      Difficulty of Paying Living Expenses: Not hard at all   Food Insecurity: No Food Insecurity (9/15/2024)    Food Insecurity     Food Insecurity: Never true   Transportation Needs: No Transportation Needs (9/15/2024)    Transportation Needs     Lack of Transportation: No   Housing Stability: Low Risk  (9/15/2024)    Housing Stability     Housing Instability: No     Housing Instability Emergency: No      FamHx: The patient has no family history of colon cancer or other gastrointestinal malignancies;  No family history of ulcer disease, or inflammatory bowel disease  ROS:  In addition to the pertinent positives described above:            Infectious Disease:  + C Diff (10/2022 and 12/2023)            Cardiovascular: + CAD s/p CABG, HTN, dyslipidemia             Respiratory: + recurrent pneumonia            Hematologic: + PE/DVT;  The patient has no history of known chronic anemia            Dermatologic: The patient reports no recent rashes or chronic skin disorders            Rheumatologic: The patient reports no history of chronic arthritis, myalgias, arthralgias            Genitourinary:  The patient reports no history of recurrent urinary tract infections, hematuria, dysuria, or nephrolithiasis           Psychiatric: + depression           Oncologic: + metastatic esophageal adenocarcinoma            ENT: The patient reports no hoarseness of voice, hearing loss, sinus congestion, tinnitus           Neurologic: The patient reports no history of seizure, stroke, or frequent headaches  PE: /67 (BP Location: Right arm)   Pulse 50   Temp 98.7 °F (37.1 °C) (Oral)   Resp 15   Ht 6' 2\" (1.88 m)   Wt 155 lb (70.3 kg)   SpO2 98%   BMI 19.90 kg/m²   Gen: AAO x 3, able to speak in complete sentences  HENT: EOMI, PERRL, oropharynx is clear with moist mucosal membranes  Eyes: Sclerae are icteric  Neck:  Supple without nuchal rigidity  CV: Regular rate and rhythm, with normal S1 and S2  Resp: Clear to auscultation  bilaterally without wheezes; rubs, rhonchi, or rales  Abdomen: Soft, RUQ tenderness, non-distended with the presence of bowel sounds; No hepatosplenomegaly; no rebound or guarding; No ascites is clinically apparent; no tympany to percussion; healed abd incisions   Ext: No peripheral edema or cyanosis  Skin: Warm and dry; jaundice   Psychiatric: Appropriate mood and congruent affect without obvious depression or anxiety  Labs:   Lab Results   Component Value Date    WBC 5.1 09/16/2024    HGB 10.7 09/16/2024    HCT 33.7 09/16/2024    .0 09/16/2024    CREATSERUM 0.53 09/16/2024    BUN 9 09/16/2024     09/16/2024    K 4.1 09/16/2024     09/16/2024    CO2 30.0 09/16/2024    GLU 96 09/16/2024    CA 8.7 09/16/2024    ALB 3.0 09/16/2024    ALKPHO 465 09/16/2024    BILT 6.7 09/16/2024     09/16/2024     09/16/2024    LIP 19 09/15/2024    MG 1.6 09/16/2024     Recent Labs   Lab 09/15/24  1225 09/16/24  0653   * 96   BUN 9 9   CREATSERUM 0.64* 0.53*   CA 8.9 8.7    137   K 4.1 4.1    104   CO2 29.0 30.0     Recent Labs   Lab 09/16/24  0653   RBC 3.61*   HGB 10.7*   HCT 33.7*   MCV 93.4   MCH 29.6   MCHC 31.8   RDW 16.4   NEPRELIM 3.47   WBC 5.1   .0       Recent Labs   Lab 09/15/24  1225 09/16/24  0653   * 358*   * 209*       Imaging:   PROCEDURE:  CTA CHEST + CT ABD (W) + CT PEL (W) SH(CPT=71275/95413)     COMPARISON:  LINO KING, PET STANDARD BODY SCAN (ONCOLOGY) (CPT=78815), 8/08/2024, 9:18 AM.  CHRISTINE , CT, CT CHEST PE AORTA (IV ONLY) (CPT=71260), 6/18/2024, 8:34 PM.  CHRISTINE , CT, CT ABDOMEN+PELVIS(CONTRAST ONLY)(CPT=74177), 3/14/2024, 10:14 PM.     INDICATIONS:  Chest pain, shortness of breath, abdominal discomfort, jaundice, patient with history of metastatic esophageal cancer, had liver biopsy 5 days ago.     TECHNIQUE:  CT of the chest (with CTA imaging), abdomen, and pelvis were obtained post- injection of non-ionic intravenous contrast  material. Multi-planar reformatted/3-D images were created to optimize visualization of vascular anatomy.  Dose reduction  techniques were used. Dose information is transmitted to the ACR (American College of Radiology) NRDR (National Radiology Data Registry) which includes the Dose Index Registry.     PATIENT STATED HISTORY:(As transcribed by Technologist)  Patient states chest pain, sob, abdominal pain for 3 weeks, worse past 2 days. History of esophageal cancer.      CONTRAST USED:  100cc of Isovue 370     FINDINGS:       CHEST:    LUNGS:  Mild subpleural reticular opacities are noted which may measure stent an interstitial inflammatory process.  Previously seen reticular opacities in the right lower lobe appear improved compared to the prior CT.  Small amount of debris is noted  within the trachea, concerning for aspiration plan  MEDIASTINUM:  No mass or adenopathy.  Status post esophagectomy with gastric pull-through.  AIDAN:  No mass or adenopathy.    CARDIAC:  Severe coronary artery atherosclerosis is noted.  PLEURA:  No mass or effusion.    CHEST WALL:  No mass or axillary adenopathy.    AORTA:  Ectasia of the ascending aorta measures up to 3.8 cm.  VASCULATURE:  No visible pulmonary arterial thrombus or attenuation.       ABDOMEN/PELVIS:  LIVER:  A low-attenuation mass predominate within segment 7 measures up to 2.9 x 4.2 cm (image 17), previously measuring up to 1.2 x 1.1 cm on 03/14/2024.  An additional mass predominate within segment 8 of the liver is noted measuring up to 3.6 x 3.5 cm   (image 12), previously measuring 1.8 x 1.5 cm.  A lesion predominantly at the junction of segments 5 and 8 measures up to 2.6 x 2.6 cm (image 24), previously measuring up to 1.4 x 1.2 cm.  BILIARY:  The gallbladder is distended.  Intra and extrahepatic biliary ductal dilatation is noted.  Obstruction at the level of a pancreatic head mass is noted which measures up to 4.5 x 2.9 cm (image 37)  PANCREAS:  Dominant mass in  the head of pancreas as described above.  A 2nd mass in the head of the pancreas is noted measuring up to 1.2 x 1.1 cm (series 11, image 30).  The pancreatic body and tail atrophic.  SPLEEN:  No enlargement or focal lesion.    KIDNEYS:  No mass, obstruction, or calcification.    ADRENALS:  No mass or enlargement.    AORTA:  No aneurysm or dissection.    RETROPERITONEUM:  No mass or adenopathy.    BOWEL/MESENTERY:  No visible mass, obstruction, or bowel wall thickening.    ABDOMINAL WALL:  No mass or hernia.    URINARY BLADDER:  No visible focal wall thickening, lesion, or calculus.    PELVIC NODES:  No adenopathy.    PELVIC ORGANS:  No visible mass.  Pelvic organs appropriate for patient age.    BONES:  No bony lesion or fracture.      Impression   CONCLUSION:    1. Pancreatic masses appear progressed in size compared to the prior examination.  2. Hepatic metastatic disease appears progressed in size compared to the prior exam.  New intra and extrahepatic biliary ductal dilatation.  3. Status post esophagectomy with gastric pull-through.  4. Subpleural reticular opacities in the lungs may represent an inflammatory or interstitial process.  This may be further evaluated with high-resolution CT of the chest if indicated.     LOCATION:  [facility]     Dictated by (CST): Shar Rousseau MD on 9/15/2024 at 2:53 PM      Finalized by (CST): Shar Rousseau MD on 9/15/2024 at 3:04 PM      Impression: 53 yr-old male with an extensive hx including CAD s/p CABG, dyslipidemia, C Diff, and distal esophageal adenocarcinoma (dx 6/2022) s/p esophagogastrectomy (9/2022; Dr Lu) recovery c/b anastomotic leak requiring endoscopic closure with stenting with course further c/b GOO s/p EGD with stenting + Botox and numerous admissions for cough/recurrent PNA found to have severe ulceration at esophagogastric anastomosis with a bronchial fistula tract opening requiring numerous endoscopic procedures to close this fistula however pt eventually  required Latissimus dorsi flap reconstruction (5/2024; [facility]). Pt was noted to have new lymph adenopathy, new liver + pancreatic lesions and re-started chemo (6/2024; last dose ~3 weeks ago; followed by Dr Foster).   Pt admitted with RUQ pain found to progressive hepatic metastatic disease with new intra/extrahepatic biliary ductal dilation; labs with rising LFTs (  ALK PHOS 498 Bili 6.7) from normal bili levels on 8/14/24--most likely r/t malignant obstruction. Case reviewed with interventional GI (Dr Ritchie) and plan for ERCP with possible biliary stenting in AM once Eliquis has been held for at least 48 hrs. No need for MRCP at this time per Dr Ritchie  The risks, benefits, alternatives of the procedure including the risks of anesthesia, bleeding, perforation, missed lesions, need for surgery, infection, and acute pancreatitis were discussed with the patient + wife at bedside. He and his wife expressed understanding of the risks and are agreeable to proceed.    Recommendations:     ERCP with possible biliary stenting under MAC in AM with Dr Ritchie  OK for diet today; NPO after midnight with sips for necessary medications   Please hold Eliquis if the risk remains acceptable   Pain management per Hospitalist recommendations   CBC, CMP in AM correcting electrolytes as needed per Hospitalist recommendations     Thank you for the consultation, we will follow the patient with you.  Attending addendum (Dr Cardona) to follow later today and provide formal, final recommendations at that time   ARTEMIO Hood  10:36 AM  9/16/2024  [facility]  [phone]    GI attending addendum:    I have personally seen and examined this patient and agree with above nurse practitioner's assessment and recommendations. 52 y/o male with a h/o metastatic adenocarcinoma of the esophagus presenting with biliary obstruction, RUQ pain, jaundice. On imaging, has progression of both intra and extra  hepatic metastatic disease. On Eliquis for h/o DVT/PE, last dose yesterday. Plan for ERCP tomorrow with possible stenting with Dr. Ritchie. R/B/A discussed with patient and wife at bedside they expressed  understanding and are willing to proceed.     Kira Cardona MD  3:09 PM  9/16/2024  [facility]  [phone]

## 2024-09-16 NOTE — DIETARY NOTE
[facility]   part of [facility]    NUTRITION ASSESSMENT    Pt meets moderate malnutrition criteria at this time.    CRITERIA FOR MALNUTRITION DIAGNOSIS:  Criteria for severe malnutrition diagnosis: acute illness/injury related to wt loss greater than 2% in 1 week and energy intake less than 50% for greater than 5 days      NUTRITION INTERVENTION:    RD nutrition Care Plan- Liberalized diet, Encouraged increased PO intake, and Encouraged small frequent meals with emphasis on high calorie/high protein  Meal and Snacks - Monitor and encourage adequate PO intake.   Medical Food Supplements - Pt not interested at this time. Rationale/use for oral supplements discussed.        PATIENT STATUS: 09/16/24 at risk  53 year old male admitted with abdominal pain, jaundice and elevated liver enzymes.  Pt is currently npo for testing. He reports decreased intake the last couple of weeks due to abdominal pain but normally eats well. He will do smoothies when not eating well and has been able to maintain wt between 3-5 lbs.  Pt with mild muscle and fat depletion.  Pt not interested in ONS at this time but is feeling hungry and ready to eat once diet advances.       ANTHROPOMETRICS:  Ht: 188 cm (6' 2\")  Wt: 70.3 kg (155 lb).   BMI: Body mass index is 19.9 kg/m².  IBW: 86.3 kg      WEIGHT HISTORY:   Weight loss: pt current wt at 155lbs. Pt reports wt normally between 168-162 pounds.  Lost about 10 pounds 6% wt loss over 2 weeks which is significant per standards.     Wt Readings from Last 10 Encounters:   09/15/24 70.3 kg (155 lb)   09/06/24 76.2 kg (168 lb)   08/19/24 76.2 kg (168 lb)   08/14/24 76.4 kg (168 lb 8 oz)   08/02/24 74.2 kg (163 lb 9.6 oz)   07/29/24 76.9 kg (169 lb 8 oz)   07/15/24 74.4 kg (164 lb)   07/09/24 73.5 kg (162 lb)   06/27/24 78.7 kg (173 lb 8 oz)   06/19/24 75.7 kg (166 lb 14.2 oz)        NUTRITION:  Diet:       Procedures    Regular/General diet Is Patient on Accuchecks? No      Food Allergies:  No  Cultural/Ethnic/Jain Preferences Addressed: Yes    Percent Meals Eaten (last 3 days)       Date/Time Percent Meals Eaten (%)    09/15/24 1825 100 %     Percent Meals Eaten (%): food from home at 09/15/24 1825            GI system review: abdominal pain Last BM Date: 09/15/24  Skin and wounds: none    NUTRITION RELATED PHYSICAL FINDINGS:     1. Body Fat/Muscle Mass: mild depletion body fat Orbital fat pad and Buccal fat pad and mild muscle depletion Temple region and Clavicle region     2. Fluid Accumulation: none     NUTRITION PRESCRIPTION: 70.3kg  Calories: 0669-3636 calories/day (30-35 kcal/kg)  Protein:  grams protein/day (1.2-1.5 grams protein per kg)  Fluid: ~1 ml/kcal or per MD discretion    NUTRITION DIAGNOSIS/PROBLEM:  Malnutrition related to physiological causes as evidenced by documented/reported insufficient oral intake and documented/reported unintentional weight loss      MONITOR AND EVALUATE/NUTRITION GOALS:  PO intake of 75% of meals TID - New  Weight stable within 1 to 2 lbs during admission - New      MEDICATIONS:  Reviewed    LABS:  Reviewed    Pt is at Moderate nutrition risk    Juhi Valentino RD,LDN  Clinical Dietitian  [phone]

## 2024-09-17 ENCOUNTER — ANESTHESIA EVENT (OUTPATIENT)
Dept: ENDOSCOPY | Facility: HOSPITAL | Age: 54
End: 2024-09-17
Payer: COMMERCIAL

## 2024-09-17 ENCOUNTER — ANESTHESIA (OUTPATIENT)
Dept: ENDOSCOPY | Facility: HOSPITAL | Age: 54
End: 2024-09-17
Payer: COMMERCIAL

## 2024-09-17 ENCOUNTER — APPOINTMENT (OUTPATIENT)
Dept: GENERAL RADIOLOGY | Facility: HOSPITAL | Age: 54
End: 2024-09-17
Attending: HOSPITALIST
Payer: COMMERCIAL

## 2024-09-17 LAB
ALBUMIN SERPL-MCNC: 3.1 G/DL (ref 3.2–4.8)
ALBUMIN/GLOB SERPL: 1.2 {RATIO} (ref 1–2)
ALP LIVER SERPL-CCNC: 543 U/L
ALT SERPL-CCNC: 293 U/L
ANION GAP SERPL CALC-SCNC: 8 MMOL/L (ref 0–18)
AST SERPL-CCNC: 164 U/L (ref ?–34)
BASOPHILS # BLD AUTO: 0.05 X10(3) UL (ref 0–0.2)
BASOPHILS NFR BLD AUTO: 0.8 %
BILIRUB SERPL-MCNC: 7.2 MG/DL (ref 0.3–1.2)
BUN BLD-MCNC: 7 MG/DL (ref 9–23)
CALCIUM BLD-MCNC: 8.7 MG/DL (ref 8.7–10.4)
CHLORIDE SERPL-SCNC: 103 MMOL/L (ref 98–112)
CO2 SERPL-SCNC: 26 MMOL/L (ref 21–32)
CREAT BLD-MCNC: 0.51 MG/DL
EGFRCR SERPLBLD CKD-EPI 2021: 120 ML/MIN/1.73M2 (ref 60–?)
EOSINOPHIL # BLD AUTO: 0.27 X10(3) UL (ref 0–0.7)
EOSINOPHIL NFR BLD AUTO: 4.6 %
ERYTHROCYTE [DISTWIDTH] IN BLOOD BY AUTOMATED COUNT: 16.6 %
GLOBULIN PLAS-MCNC: 2.5 G/DL (ref 2–3.5)
GLUCOSE BLD-MCNC: 89 MG/DL (ref 70–99)
HCT VFR BLD AUTO: 33.5 %
HGB BLD-MCNC: 10.9 G/DL
IMM GRANULOCYTES # BLD AUTO: 0.16 X10(3) UL (ref 0–1)
IMM GRANULOCYTES NFR BLD: 2.7 %
LYMPHOCYTES # BLD AUTO: 0.44 X10(3) UL (ref 1–4)
LYMPHOCYTES NFR BLD AUTO: 7.4 %
MAGNESIUM SERPL-MCNC: 1.7 MG/DL (ref 1.6–2.6)
MCH RBC QN AUTO: 30.2 PG (ref 26–34)
MCHC RBC AUTO-ENTMCNC: 32.5 G/DL (ref 31–37)
MCV RBC AUTO: 92.8 FL
MONOCYTES # BLD AUTO: 0.7 X10(3) UL (ref 0.1–1)
MONOCYTES NFR BLD AUTO: 11.8 %
NEUTROPHILS # BLD AUTO: 4.29 X10 (3) UL (ref 1.5–7.7)
NEUTROPHILS # BLD AUTO: 4.29 X10(3) UL (ref 1.5–7.7)
NEUTROPHILS NFR BLD AUTO: 72.7 %
OSMOLALITY SERPL CALC.SUM OF ELEC: 281 MOSM/KG (ref 275–295)
PLATELET # BLD AUTO: 180 10(3)UL (ref 150–450)
POTASSIUM SERPL-SCNC: 3.8 MMOL/L (ref 3.5–5.1)
PROT SERPL-MCNC: 5.6 G/DL (ref 5.7–8.2)
RBC # BLD AUTO: 3.61 X10(6)UL
SODIUM SERPL-SCNC: 137 MMOL/L (ref 136–145)
WBC # BLD AUTO: 5.9 X10(3) UL (ref 4–11)

## 2024-09-17 PROCEDURE — 0F7D8DZ DILATION OF PANCREATIC DUCT WITH INTRALUMINAL DEVICE, VIA NATURAL OR ARTIFICIAL OPENING ENDOSCOPIC: ICD-10-PCS | Performed by: INTERNAL MEDICINE

## 2024-09-17 PROCEDURE — 0F798DZ DILATION OF COMMON BILE DUCT WITH INTRALUMINAL DEVICE, VIA NATURAL OR ARTIFICIAL OPENING ENDOSCOPIC: ICD-10-PCS | Performed by: INTERNAL MEDICINE

## 2024-09-17 PROCEDURE — 74328 X-RAY BILE DUCT ENDOSCOPY: CPT | Performed by: HOSPITALIST

## 2024-09-17 PROCEDURE — 99233 SBSQ HOSP IP/OBS HIGH 50: CPT | Performed by: INTERNAL MEDICINE

## 2024-09-17 PROCEDURE — 99232 SBSQ HOSP IP/OBS MODERATE 35: CPT | Performed by: HOSPITALIST

## 2024-09-17 DEVICE — GEENEN SOF-FLEX PANCREATIC STENT
Type: IMPLANTABLE DEVICE | Site: PANCREATIC | Status: FUNCTIONAL
Brand: GEENEN SOF-FLEX

## 2024-09-17 DEVICE — STENT SYSTEM RMV
Type: IMPLANTABLE DEVICE | Site: BILIARY | Status: FUNCTIONAL
Brand: WALLFLEX BILIARY

## 2024-09-17 RX ORDER — HEPARIN SODIUM 5000 [USP'U]/ML
5000 INJECTION, SOLUTION INTRAVENOUS; SUBCUTANEOUS EVERY 8 HOURS SCHEDULED
Status: DISCONTINUED | OUTPATIENT
Start: 2024-09-17 | End: 2024-09-18

## 2024-09-17 RX ORDER — SODIUM CHLORIDE, SODIUM LACTATE, POTASSIUM CHLORIDE, CALCIUM CHLORIDE 600; 310; 30; 20 MG/100ML; MG/100ML; MG/100ML; MG/100ML
INJECTION, SOLUTION INTRAVENOUS CONTINUOUS PRN
Status: DISCONTINUED | OUTPATIENT
Start: 2024-09-17 | End: 2024-09-17 | Stop reason: SURG

## 2024-09-17 RX ORDER — CETIRIZINE HYDROCHLORIDE 10 MG/1
10 TABLET ORAL DAILY
Status: DISCONTINUED | OUTPATIENT
Start: 2024-09-17 | End: 2024-09-20

## 2024-09-17 RX ORDER — NALOXONE HYDROCHLORIDE 0.4 MG/ML
0.08 INJECTION, SOLUTION INTRAMUSCULAR; INTRAVENOUS; SUBCUTANEOUS ONCE AS NEEDED
Status: DISCONTINUED | OUTPATIENT
Start: 2024-09-17 | End: 2024-09-17 | Stop reason: HOSPADM

## 2024-09-17 RX ORDER — MAGNESIUM OXIDE 400 MG/1
400 TABLET ORAL ONCE
Status: COMPLETED | OUTPATIENT
Start: 2024-09-17 | End: 2024-09-17

## 2024-09-17 RX ORDER — LIDOCAINE HYDROCHLORIDE 10 MG/ML
INJECTION, SOLUTION EPIDURAL; INFILTRATION; INTRACAUDAL; PERINEURAL AS NEEDED
Status: DISCONTINUED | OUTPATIENT
Start: 2024-09-17 | End: 2024-09-17 | Stop reason: SURG

## 2024-09-17 RX ORDER — SODIUM CHLORIDE, SODIUM LACTATE, POTASSIUM CHLORIDE, CALCIUM CHLORIDE 600; 310; 30; 20 MG/100ML; MG/100ML; MG/100ML; MG/100ML
INJECTION, SOLUTION INTRAVENOUS CONTINUOUS
Status: DISCONTINUED | OUTPATIENT
Start: 2024-09-17 | End: 2024-09-18

## 2024-09-17 RX ORDER — INDOMETHACIN 100 MG
SUPPOSITORY, RECTAL RECTAL
Status: DISPENSED
Start: 2024-09-17 | End: 2024-09-18

## 2024-09-17 RX ORDER — INDOMETHACIN 50 MG/1
SUPPOSITORY RECTAL
Status: DISCONTINUED | OUTPATIENT
Start: 2024-09-17 | End: 2024-09-17 | Stop reason: HOSPADM

## 2024-09-17 RX ADMIN — SODIUM CHLORIDE, SODIUM LACTATE, POTASSIUM CHLORIDE, CALCIUM CHLORIDE: 600; 310; 30; 20 INJECTION, SOLUTION INTRAVENOUS at 16:05:00

## 2024-09-17 RX ADMIN — SODIUM CHLORIDE, SODIUM LACTATE, POTASSIUM CHLORIDE, CALCIUM CHLORIDE: 600; 310; 30; 20 INJECTION, SOLUTION INTRAVENOUS at 16:29:00

## 2024-09-17 RX ADMIN — LIDOCAINE HYDROCHLORIDE 5 ML: 10 INJECTION, SOLUTION EPIDURAL; INFILTRATION; INTRACAUDAL; PERINEURAL at 16:10:00

## 2024-09-17 NOTE — PLAN OF CARE
Patient is alert and oriented, still complains of abdominal pian. PRN Dilaudid given for pain. Eliquis still on hold. IVF infusing, Patient NPO since midnight. Plan for ERCP with possible stent placement. Safety precautions in place, call light within reach, plan of care ongoing.     Problem: PAIN - ADULT  Goal: Verbalizes/displays adequate comfort level or patient's stated pain goal  Description: INTERVENTIONS:  - Encourage pt to monitor pain and request assistance  - Assess pain using appropriate pain scale  - Administer analgesics based on type and severity of pain and evaluate response  - Implement non-pharmacological measures as appropriate and evaluate response  - Consider cultural and social influences on pain and pain management  - Manage/alleviate anxiety  - Utilize distraction and/or relaxation techniques  - Monitor for opioid side effects  - Notify MD/LIP if interventions unsuccessful or patient reports new pain  - Anticipate increased pain with activity and pre-medicate as appropriate  Outcome: Progressing

## 2024-09-17 NOTE — PROGRESS NOTES
[facility] Progress Note   Length of Stay: 2    Subjective:   -No new complaints. Jaundice about the same  -ERCP Planned     Oncology History   -2018: Per report had a normal EGD and colonoscopy     -June 2022: He met with GI due to new onset dysphagia which started about 1 month prior to his visit.  He had an esophagram with a focal abrupt narrowing with irregular margins in the distal one third of the esophagus extending to the GE junction.  He also had an episode of food impaction. He has also lost about 15 lb. He was a heavy smoker from age 14 to about 40 (1.5 PPD). Also with alcohol use currently. Mother with a history of breast and colon cancer.  EGD and EUS with Dr. Yadav on 6/7/2022: Severe esophagitis with a concerning mass extending 37-39 centimeters from the incisors.  Nonobstructive mass.  By EUS uT3N1 disease.  Pathology showed invasive moderate to poorly differentiated adenocarcinoma.  HER2 amplified by FISH. CT CAP done at [facility] on 6/16/22: Results not loaded to PACs yet.  CA 19-9 from the same day was 107.4.  CEA normal. PET/CT on 6/20/2022: Increased distal esophageal uptake with an SUV of 14.4.  Concern for shreya metastases.     -Concurrent chemoradiation with carboplatin AUC2 plus paclitaxel 50 mg/m2: July 2022-August 2022 with  down (280-->30). Post treatment PET/CT showed improvement.      -Surgery with Dr. Lu on 9/30/22: ypT1b pN0. Recovery has been complicated by an esophageal leak,      -I met with him on 12/12/22. We discussed adjuvant opdivo for 1 year. His  was elevated to 48 and repeat was 64. CT CAP was recommended.      -He was admitted on 12/25/22 for abdominal pain. He had a POEM procedure done. He was also noted to have a RLL consolidation. He was seen by pulmonary, based on imaging an outpatient PET/CT was recommended and a biopsy of the most FDG avid lesion would be considered. Noted to have CAD and an outpatient evaluation was  recommended. Outpatient PET/CT done on 1/4/23 was reviewed in tumor board and there was no focal area of concern and adjuvant immunotherapy recommended.      -Adjuvant opdivo:01/2023-03/2023. PET/CT in in 04/2023 showed uptake in the pancreatic head and tail. Also with some uptake in liver. EGD/EUS on 4/27/23 with Dr. Ritchie: Pancreatic head mass positive for adenocarcinoma: Comparison to previous esophageal cancer shows similarity but IHC in non-specific. Her 2 IHC was 2+ on this specimen but FISH was negative.  Given discordant results on initial HER2 and follow-up HER2 we decided to proceed with chemotherapy with immunotherapy with HER2 directed therapy.     -Chemo + Herceptin + Immunotherapy: He received 7 cycles of FOLFOX + Herceptin + Immunotherapy (05/2023-09/2023). Imaging after C5 showed a favorable response. After C7, we held oxaliplatin due to neuropathy and he was started on maintenance herceptin + IO maintenance. Signatera was negative 09/11/23.     -Herceptin + IO Maintenance: He received 3 cycles from 09/25/23-10/31/23. Treatment was delayed due to hospitalizations.      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 Due to new findings-restarting FOLFOX discussed.      -admitted from 1/8/24-1/13/24 for persistent dysphagia and bronchesophageal stent.      -1/17/24: EGD with fistula closure with ASD occluder and removal of the bronchial stent.     -FOLFOX + Herceptin + Opdivo restarted: 3 cycles: 02/2024-03/2024     -Treatment delayed again for recurrent hospital admissions     -He underwent a Latissimus dorsi flap reconstruction for his bronchoesophageal fistula on 5/1/24      -PET/CT 5/15/24: at least 3 areas of uptake in the right hepatic lobe, uptake in pancrease and retroperitoneum. Post-operative uptake in chest wall.      -FOLFOX + Herceptin + Nivolumab Restarted: 6/11/2024 until 8/14/2024.  He received 5 cycles.  A PET scan after 5 cycles showed overall progression of metastatic disease with new gastrohepatic and peripancreatic lymph nodes along with enlargement of pancreatic and hepatic metastases.  There were also new lung nodules.  A CT-guided liver biopsy on 9/10/2024 was done which was positive for metastatic adenocarcinoma and the immunoprofile is compatible with the patient's esophageal primary.     -He was admitted on 9/15/24 for abdominal pain.  Labs showed elevated AST/ALT and T bili (6.7). CTA CAP showed progression of pancreatic and hepatic masses. There is intra-extra hepatic biliary ductal dilation. Subpleural reticular opacities noted-inflammatory or interstitial process? Hi Res CT recommended.     ROS: 12 Point ROS completed and pertinent positives are above     Objective:    [Held by provider] apixaban  5 mg Oral BID    sertraline  200 mg Oral Daily    pregabalin  25 mg Oral Nightly    pantoprazole  40 mg Oral QAM AC    OLANZapine  5 mg Oral Nightly    metoprolol tartrate  25 mg Oral 2x Daily(Beta Blocker)    furosemide  20 mg Oral Daily    losartan  50 mg Oral Daily       heparin    dicyclomine    oxyCODONE    ondansetron    acetaminophen    melatonin    glycerin-hypromellose-    sodium chloride    polyethylene glycol (PEG 3350)    sennosides    bisacodyl    fleet enema    ondansetron    prochlorperazine    HYDROmorphone **OR** HYDROmorphone **OR** HYDROmorphone    Physical Exam  Vitals:    09/17/24 0412   BP: (!) 163/84   Pulse: 66   Resp: 15   Temp: 97.7 °F (36.5 °C)     General: NAD, AOX3, jaundiced  HEENT: clear op, mmm, no jvd. EOM intact, positive scleral icterus   CV: RRR S1S2 no murmurs  Extremities: No edema  Lungs: CTAB, no increased work of  breathing  Abd: soft nt nd +BS no hepatosplenomegaly, surgical scars  Neuro: CN: II-XII grossly intact  Psych: Normal Mood and affect     Labs/Imaging/Path:  Lab Results   Component Value Date    WBC 5.9 09/17/2024    HGB 10.9 (L) 09/17/2024    HCT 33.5 (L) 09/17/2024    MCV 92.8 09/17/2024    .0 09/17/2024       Recent Labs   Lab 09/15/24  1225 09/16/24  0653   * 96   BUN 9 9   CREATSERUM 0.64* 0.53*   CA 8.9 8.7   ALB 3.3 3.0*    137   K 4.1 4.1    104   CO2 29.0 30.0   ALKPHO 498* 465*   * 209*   * 358*   BILT 6.7* 6.7*   TP 6.0 5.4*       Imaging: Reviewed     Assessment and Plan:   Jaundice 2/2 elevated bilirubin which is likely related to metastatic disease  -CT with ductal dilation. Plan for ERCP today.  Bilirubin levels will impact his chemotherapy choices in the future     Metastatic esophageal cancer  -Most recent imaging is consistent with metastatic disease progression.  A repeat HER2 on his liver biopsy is pending.  If HER2 positive we will proceed with Enhertu.  If HER2 negative we will consider FOLFIRI plus Cyramza versus paclitaxel and Cyramza     PE/RLE DVT: Dx 6/18/24: on eliquis indefinitely. Currently on hold for possible ERCP    COURTNEY Foster MD  [facility]

## 2024-09-17 NOTE — OPERATIVE REPORT
Dontae Cortez Jr. Patient Status:  Inpatient    10/15/1970 MRN [id number]   [facility] Attending Jan Brito MD   Date 2024 PCP Adrian Horowitz MD     PREOPERATIVE DIAGNOSIS/INDICATION: Malignant biliary obstruction  POSTOPERTATIVE DIAGNOSIS: Same  PROCEDURE PERFORMED: ERCP with biliary sphincterotomy and CBD, PD stent placement  TIME OUT WAS PERFORMED    SEDATION: MAC sedation provided by General Anesthesia    INFORMED CONSENT: Risks, benefits and alternatives to the procedure were explained to the patient including but not limited to bleeding, infection, perforation, adverse drug reactions, pancreatitis and the need for hospitalization and surgery if this occurs, the patient understands and agrees to procedure.  PROCEDURE DESCRIPTION: The therapeutic side viewing duodenoscope was introduced into the patient’s mouth, hypo pharynx, esophagus, stomach and the first and second portion of the duodenum, straightening of the endoscope was performed to obtain a direct view of the major ampulla. Careful but limited examination of the above described areas was performed on withdrawal of the endoscope. The patient tolerated the procedure well and there were no immediate complications noted during the procedure, the patient was transported to the recovery area in stable condition.  FINDINGS:  DUODENUM: Normal  MAJOR AMPULLA: Normal  ERP: Guidewire cannulation only  ERC: The CBD showed distal 3 cm stricture with upstream dilatation of the CHD, the confluence of the right and left hepatic ducts and the intrahepatics, the cystic duct and the gallbladder were poor ly visualized and appear grossly normal.  THERAPEUTICS: The CBD and PD were cannulated using a standard 0.035 [place] scientific  Hydratome RX44, 20mm cut wire, deep cannulation was performed with the help of a 0.035 straight Acrobat wire 260cm in length, an adequate biliary sphincterotomy was performed with standard  ERBE settings, there were no immediate complication noted. We placed a biliary biliary fully covered wallstent size 10 x 60 mm successfully; at the end of the procedure the proximal end was located at the CHD and the distal end in the duodenum. We placed a pancreas Geenen plastic stent size 5 Fr x 3 cm with no internal flanges successfully; at the end of the procedure the proximal end was located at the pancreas head and the distal end in the duodenum  RECOMMENDATIONS:   Post ERCP precautions, watch for bleeding, infection, perforation, adverse drug reactions and pancreatitis.  CMP, CBC in AM.  Call status report post procedure.  Trial of clears and advance as tolerated  KUB in 1 week    Raheel Ritchie MD  9/17/2024  4:32 PM

## 2024-09-17 NOTE — PLAN OF CARE
Pt received alert and oriented x4. VSS. No sob on RA. Afebrile. C/o pain to abdomen and back. PRN given with relief. IVF infusing. ERCP today. All meds given per MAR. Up and using the bathroom. Tolerating diet. Fall precautions in place, call light within reach     Problem: PAIN - ADULT  Goal: Verbalizes/displays adequate comfort level or patient's stated pain goal  Description: INTERVENTIONS:  - Encourage pt to monitor pain and request assistance  - Assess pain using appropriate pain scale  - Administer analgesics based on type and severity of pain and evaluate response  - Implement non-pharmacological measures as appropriate and evaluate response  - Consider cultural and social influences on pain and pain management  - Manage/alleviate anxiety  - Utilize distraction and/or relaxation techniques  - Monitor for opioid side effects  - Notify MD/LIP if interventions unsuccessful or patient reports new pain  - Anticipate increased pain with activity and pre-medicate as appropriate  Outcome: Progressing

## 2024-09-17 NOTE — ANESTHESIA PREPROCEDURE EVALUATION
PRE-OP EVALUATION    Patient Name: Dontae Cortez Jr.    Admit Diagnosis: Hyperbilirubinemia [E80.6]  Biliary obstruction (HCC) [K83.1]  Transaminitis [R74.01]    Pre-op Diagnosis: INPATIENT    ENDOSCOPIC RETROGRADE CHOLANGIOPANCREATOGRAPHY (ERCP)    Anesthesia Procedure: ENDOSCOPIC RETROGRADE CHOLANGIOPANCREATOGRAPHY (ERCP)    Surgeons and Role:     * Raheel Ritchie MD - Primary    Pre-op vitals reviewed.  Temp: 98 °F (36.7 °C)  Pulse: 53  Resp: 18  BP: 149/88  SpO2: 99 %  Body mass index is 19.9 kg/m².    Current medications reviewed.  Hospital Medications:  • heparin (Porcine) 5000 UNIT/ML injection 5,000 Units  5,000 Units Subcutaneous Q8H INNA   • [COMPLETED] magnesium oxide (Mag-Ox) tab 400 mg  400 mg Oral Once   • cetirizine (ZyrTEC) tab 10 mg  10 mg Oral Daily   • [COMPLETED] magnesium oxide (Mag-Ox) tab 400 mg  400 mg Oral Once   • heparin (Porcine) 100 Units/mL lock flush 500 Units  5 mL Intravenous PRN   • [COMPLETED] HYDROmorphone (Dilaudid) 1 MG/ML injection 0.5 mg  0.5 mg Intravenous Q30 Min PRN   • [COMPLETED] sodium chloride 0.9 % IV bolus 500 mL  500 mL Intravenous Once   • [COMPLETED] iopamidol 76% (ISOVUE-370) injection for power injector  100 mL Intravenous ONCE PRN   • [COMPLETED] piperacillin-tazobactam (Zosyn) 4.5 g in dextrose 5% 100 mL IVPB-ADDV  4.5 g Intravenous Once   • [Held by provider] apixaban (Eliquis) tab 5 mg  5 mg Oral BID   • dicyclomine (Bentyl) cap 10 mg  10 mg Oral Q6H PRN   • sertraline (Zoloft) tab 200 mg  200 mg Oral Daily   • pregabalin (Lyrica) cap 25 mg  25 mg Oral Nightly   • pantoprazole (Protonix) DR tab 40 mg  40 mg Oral QAM AC   • oxyCODONE immediate release tab 5 mg  5 mg Oral Q4H PRN   • ondansetron (Zofran-ODT) disintegrating tab 8 mg  8 mg Oral Q8H PRN   • OLANZapine (ZyPREXA) tab 5 mg  5 mg Oral Nightly   • metoprolol tartrate (Lopressor) tab 25 mg  25 mg Oral 2x Daily(Beta Blocker)   • furosemide (Lasix) tab 20 mg  20 mg Oral Daily   • losartan  (Cozaar) tab 50 mg  50 mg Oral Daily   • acetaminophen (Tylenol Extra Strength) tab 500 mg  500 mg Oral Q4H PRN   • melatonin tab 3 mg  3 mg Oral Nightly PRN   • glycerin-hypromellose- (Artificial Tears) 0.2-0.2-1 % ophthalmic solution 1 drop  1 drop Both Eyes QID PRN   • sodium chloride (Saline Mist) 0.65 % nasal solution 1 spray  1 spray Each Nare Q3H PRN   • sodium chloride 0.9% infusion   Intravenous Continuous   • polyethylene glycol (PEG 3350) (Miralax) 17 g oral packet 17 g  17 g Oral Daily PRN   • sennosides (Senokot) tab 17.2 mg  17.2 mg Oral Nightly PRN   • bisacodyl (Dulcolax) 10 MG rectal suppository 10 mg  10 mg Rectal Daily PRN   • fleet enema (Fleet) rectal enema 133 mL  1 enema Rectal Once PRN   • ondansetron (Zofran) 4 MG/2ML injection 4 mg  4 mg Intravenous Q6H PRN   • prochlorperazine (Compazine) 10 MG/2ML injection 5 mg  5 mg Intravenous Q8H PRN   • HYDROmorphone (Dilaudid) 1 MG/ML injection 0.2 mg  0.2 mg Intravenous Q2H PRN    Or   • HYDROmorphone (Dilaudid) 1 MG/ML injection 0.4 mg  0.4 mg Intravenous Q2H PRN    Or   • HYDROmorphone (Dilaudid) 1 MG/ML injection 0.8 mg  0.8 mg Intravenous Q2H PRN       Outpatient Medications:     Medications Prior to Admission   Medication Sig Dispense Refill Last Dose   • OLANZapine 5 MG Oral Tab Take 1 tablet (5 mg total) by mouth nightly.   9/14/2024   • prochlorperazine (COMPAZINE) 10 mg tablet Take 1 tablet (10 mg total) by mouth every 6 (six) hours as needed for Nausea.   Past Week   • pantoprazole 40 MG Oral Tab EC Take 1 tablet (40 mg total) by mouth before breakfast. 30 tablet 0 9/15/2024   • metoprolol tartrate 25 MG Oral Tab Take 1 tablet (25 mg total) by mouth 2x Daily(Beta Blocker). 60 tablet 1 9/15/2024   • oxyCODONE 5 MG Oral Tab Take 1 tablet (5 mg total) by mouth every 4 (four) hours as needed for Pain. 90 tablet 0 9/15/2024   • ondansetron 8 MG Oral Tablet Dispersible Take 1 tablet (8 mg total) by mouth every 8 (eight) hours as needed  for Nausea. 30 tablet 0 Past Week   • dicyclomine 10 MG Oral Cap Take 1 capsule (10 mg total) by mouth 3 (three) times daily as needed. 90 capsule 1 9/15/2024   • pregabalin 25 MG Oral Cap Take 1 capsule (25 mg total) by mouth at bedtime. 30 capsule 1 9/14/2024   • furosemide 20 MG Oral Tab Take 1 tablet (20 mg total) by mouth daily. 30 tablet 1 9/15/2024   • Potassium Chloride ER 20 MEQ Oral Tab CR Take 1 tablet by mouth daily. 30 tablet 1 9/15/2024   • apixaban 5 MG Oral Tab Take 1 tablet (5 mg total) by mouth 2 (two) times daily. 60 tablet 2 9/15/2024   • SERTRALINE 100 MG Oral Tab TAKE 1.5 TABLETS (150MG TOTAL) BY MOUTH DAILY (Patient taking differently: Take 2 tablets (200 mg total) by mouth daily.) 135 tablet 1 9/15/2024   • BENZONATATE 100 MG Oral Cap TAKE 1 CAPSULE BY MOUTH THREE TIMES A DAY AS NEEDED FOR COUGH (Patient not taking: Reported on 9/15/2024) 90 capsule 0 Not Taking   • OMEPRAZOLE 40 MG Oral Capsule Delayed Release TAKE 1 CAPSULE BY MOUTH TWICE A DAY BEFORE MEALS (Patient not taking: Reported on 9/15/2024) 180 capsule 1 Not Taking       Allergies: Oxaliplatin      Anesthesia Evaluation    Patient summary reviewed.    Anesthetic Complications           GI/Hepatic/Renal      (+) GERD          (+) liver disease                 Cardiovascular                  (+) hypertension     (+) CAD    (+) CABG/stent                            Endo/Other                                  Pulmonary               (+) shortness of breath            Neuro/Psych      (+) depression                            Past Surgical History:   Procedure Laterality Date   • Appendectomy     • Appendectomy     • Arthroscopy of joint unlisted      right shoulder   • Cabg      On 8/16/13: CABG x 4 with LIMA to LAD and SVG to diagonal, OM and PDA   • Cardiac cath lab      On 8/14/2013: cardiac cath showed 3-vessel disease   • Other surgical history      1.       Laparoscopic robotic-assisted esophagogastrectomy.   • Port, indwelling,  imp       Social History     Socioeconomic History   • Marital status:    • Number of children: 3   Occupational History   • Occupation: works as  - on workman's comp   Tobacco Use   • Smoking status: Former     Current packs/day: 0.00     Average packs/day: 1 pack/day for 27.0 years (27.0 ttl pk-yrs)     Types: Cigarettes     Start date: 8/15/1986     Quit date: 8/15/2013     Years since quittin.0   • Smokeless tobacco: Never   • Tobacco comments:     Quit smoking    Vaping Use   • Vaping status: Never Used   Substance and Sexual Activity   • Alcohol use: Never   • Drug use: Never   • Sexual activity: Not Currently     Partners: Female   Other Topics Concern   • Caffeine Concern Yes   • Stress Concern No   • Weight Concern No   • Special Diet No   • Exercise No   • Seat Belt No     History   Drug Use Unknown     Available pre-op labs reviewed.  Lab Results   Component Value Date    WBC 5.9 2024    RBC 3.61 (L) 2024    HGB 10.9 (L) 2024    HCT 33.5 (L) 2024    MCV 92.8 2024    MCH 30.2 2024    MCHC 32.5 2024    RDW 16.6 2024    .0 2024     Lab Results   Component Value Date     2024    K 3.8 2024     2024    CO2 26.0 2024    BUN 7 (L) 2024    CREATSERUM 0.51 (L) 2024    GLU 89 2024    CA 8.7 2024     Lab Results   Component Value Date    INR 1.17 09/10/2024         Airway      Mallampati: II  Mouth opening: >3 FB  TM distance: > 6 cm   Cardiovascular    Cardiovascular exam normal.         Dental             Pulmonary    Pulmonary exam normal.                 Other findings        ASA: 3   Plan: MAC  NPO status verified and patient meets guidelines.          Plan/risks discussed with: patient            Present on Admission:  **None**

## 2024-09-17 NOTE — PROGRESS NOTES
[facility]   part of Located within [facility]     Hospitalist Progress Note     Dontae Cortez . Patient Status:  Inpatient    10/15/1970 MRN [id number]   [facility] 4NW-A Attending Jan Brito MD   Hosp Day # 2 PCP Adrian Horowitz MD     Chief Complaint: Abdominal pain    Subjective:     Patient with continued abdominal pain, not much improvement from day prior, still requiring iv pain meds.  Denies any other acute complaints.      Objective:    Review of Systems:   A comprehensive review of systems was completed; pertinent positive and negatives stated in subjective.    Vital signs:  Temp:  [97.2 °F (36.2 °C)-98.5 °F (36.9 °C)] 97.7 °F (36.5 °C)  Pulse:  [47-66] 66  Resp:  [15-20] 15  BP: (139-170)/(67-84) 163/84  SpO2:  [96 %-98 %] 96 %    Physical Exam:    General: No acute distress, pleasant   Respiratory: No wheezes, no rhonchi  Cardiovascular: S1, S2, regular rate and rhythm  Abdomen: Soft, TTP, non-distended, positive bowel sounds  Neuro: No new focal deficits.   Extremities: No edema    Diagnostic Data:    Labs:  Recent Labs   Lab 09/10/24  0921 09/15/24  1225 24  0653 24  0624   WBC  --  6.4 5.1 5.9   HGB  --  11.9* 10.7* 10.9*   MCV  --  92.7 93.4 92.8   PLT  --  205.0 169.0 180.0   INR 1.17  --   --   --        Recent Labs   Lab 09/15/24  1225 24  0653   * 96   BUN 9 9   CREATSERUM 0.64* 0.53*   CA 8.9 8.7   ALB 3.3 3.0*    137   K 4.1 4.1    104   CO2 29.0 30.0   ALKPHO 498* 465*   * 209*   * 358*   BILT 6.7* 6.7*   TP 6.0 5.4*       Estimated Creatinine Clearance: 158.4 mL/min (A) (based on SCr of 0.53 mg/dL (L)).    No results for input(s): \"TROP\", \"TROPHS\", \"CK\" in the last 168 hours.    Recent Labs   Lab 09/10/24  0921   PTP 15.0*   INR 1.17                  Microbiology    No results found for this visit on 09/15/24.      Imaging: Reviewed in Epic.    Medications:    [Held by provider] apixaban  5 mg Oral BID    sertraline  200  mg Oral Daily    pregabalin  25 mg Oral Nightly    pantoprazole  40 mg Oral QAM AC    OLANZapine  5 mg Oral Nightly    metoprolol tartrate  25 mg Oral 2x Daily(Beta Blocker)    furosemide  20 mg Oral Daily    losartan  50 mg Oral Daily       Assessment & Plan:      #Abdominal pain  #Jaundice - likely obstructive   #Elevated liver enzymes  Could be obstruction given his CT showing size progression of liver mass  Pain control, anti-emetics   Bilirubin up from 6.7 -> 7.2, downtrending ast/alt  GI consult - ERCP planned for today      #Metastatic adenocarcinoma  Had liver biopsy done on 9/10/24 - awaiting final biopsy report      # Metastatic esophageal carcinoma  # Metastatic pancreatic carcinoma  s/p esophagectomy and 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 and ASD occluder device   CT abd showing pancreatic mass that progressed in size, progressed hepatic metastasis   Hem/onc consult - awaiting HER2 biopsy report      #Previous PE/DVT  Eliquis - on hold for possible ERCP, will resume tonight, heparin proph for now    #Essential HTN - losartan, metoprolol, furosemide   #DLD  #GERD/PUD - PPI  #CAD s/p CABG  #Depression/Anxiety - Zoloft, Olanzapine       Jan Brito MD    Supplementary Documentation:     Quality:  DVT Mechanical Prophylaxis:     Early ambuation  DVT Pharmacologic Prophylaxis   Medication    heparin (Porcine) 100 Units/mL lock flush 500 Units    [Held by provider] apixaban (Eliquis) tab 5 mg                Code Status: Full Code  Neumann: No urinary catheter in place  Neumann Duration (in days):   Central line:    SHUBHAM:     Discharge is dependent on: pain improvement   At this point Mr. Cortez is expected to be discharge to: Home    The 21st Century Cures Act makes medical notes like these available to patients in the interest of transparency. Please be advised this is a medical document. Medical documents are intended to carry relevant information, facts as evident, and the clinical  opinion of the practitioner. The medical note is intended as peer to peer communication and may appear blunt or direct. It is written in medical language and may contain abbreviations or verbiage that are unfamiliar.     Dietitian Malnutrition Assessment    Evaluation for Malnutrition: Criteria for severe malnutrition diagnosis- acute illness/injury related to Wt loss greater than 2% in 1 week., Energy intake less than 50% for greater than 5 days.                 RD Malnutrition Care Plan: Liberalized diet., Encouraged increased PO intake., Encouraged small frequent meals with emphasis on high calorie/high protein.    Body Fat/Muscle Mass:          Physician Assessment     Patient has a diagnosis of severe malnutrition

## 2024-09-17 NOTE — ANESTHESIA POSTPROCEDURE EVALUATION
[facility]    Dontae Cortez  Patient Status:  Inpatient   Age/Gender 53 year old male MRN [id number]   Location [facility] Attending Jan Brito MD   Hosp Day # 2 PCP Adrian Horowitz MD       Anesthesia Post-op Note    ENDOSCOPIC RETROGRADE CHOLANGIOPANCREATOGRAPHY (ERCP) with sphincterotomy, cholangiogram, biliary stent placement and pancreatic stent placement    Procedure Summary       Date: 09/17/24 Room / Location:  ENDOSCOPY 02 / [facility]    Anesthesia Start: 1605 Anesthesia Stop: 1638    Procedure: ENDOSCOPIC RETROGRADE CHOLANGIOPANCREATOGRAPHY (ERCP) with sphincterotomy, cholangiogram, biliary stent placement and pancreatic stent placement Diagnosis: (malignant biliary obstruction)    Surgeons: Raheel Ritchie MD Anesthesiologist: Cristel White MD    Anesthesia Type: MAC ASA Status: 3            Anesthesia Type: MAC    Vitals Value Taken Time   /76 09/17/24 1708   Temp  09/17/24 1709   Pulse 59 09/17/24 1708   Resp 20 09/17/24 1703   SpO2 98 % 09/17/24 1708   Vitals shown include unfiled device data.    Patient Location: Endoscopy    Anesthesia Type: MAC    Airway Patency: patent    Postop Pain Control: adequate    Mental Status: preanesthetic baseline    Nausea/Vomiting: none    Cardiopulmonary/Hydration status: stable euvolemic    Complications: no apparent anesthesia related complications    Postop vital signs: stable    Dental Exam: Unchanged from Preop    Patient to be discharged home when criteria met.

## 2024-09-18 ENCOUNTER — PATIENT MESSAGE (OUTPATIENT)
Dept: HEMATOLOGY/ONCOLOGY | Age: 54
End: 2024-09-18

## 2024-09-18 DIAGNOSIS — C15.9 ESOPHAGEAL ADENOCARCINOMA (HCC): ICD-10-CM

## 2024-09-18 DIAGNOSIS — G89.3 CANCER RELATED PAIN: ICD-10-CM

## 2024-09-18 LAB
ALBUMIN SERPL-MCNC: 3 G/DL (ref 3.2–4.8)
ALBUMIN/GLOB SERPL: 1.3 {RATIO} (ref 1–2)
ALP LIVER SERPL-CCNC: 514 U/L
ALT SERPL-CCNC: 225 U/L
ANION GAP SERPL CALC-SCNC: 6 MMOL/L (ref 0–18)
AST SERPL-CCNC: 93 U/L (ref ?–34)
BASOPHILS # BLD AUTO: 0.04 X10(3) UL (ref 0–0.2)
BASOPHILS NFR BLD AUTO: 0.6 %
BILIRUB SERPL-MCNC: 3.5 MG/DL (ref 0.3–1.2)
BUN BLD-MCNC: 7 MG/DL (ref 9–23)
CALCIUM BLD-MCNC: 8.6 MG/DL (ref 8.7–10.4)
CHLORIDE SERPL-SCNC: 103 MMOL/L (ref 98–112)
CO2 SERPL-SCNC: 30 MMOL/L (ref 21–32)
CREAT BLD-MCNC: 0.45 MG/DL
EGFRCR SERPLBLD CKD-EPI 2021: 125 ML/MIN/1.73M2 (ref 60–?)
EOSINOPHIL # BLD AUTO: 0.17 X10(3) UL (ref 0–0.7)
EOSINOPHIL NFR BLD AUTO: 2.6 %
ERYTHROCYTE [DISTWIDTH] IN BLOOD BY AUTOMATED COUNT: 16.2 %
GLOBULIN PLAS-MCNC: 2.4 G/DL (ref 2–3.5)
GLUCOSE BLD-MCNC: 89 MG/DL (ref 70–99)
HCT VFR BLD AUTO: 33.1 %
HGB BLD-MCNC: 10.4 G/DL
IMM GRANULOCYTES # BLD AUTO: 0.15 X10(3) UL (ref 0–1)
IMM GRANULOCYTES NFR BLD: 2.3 %
LYMPHOCYTES # BLD AUTO: 0.49 X10(3) UL (ref 1–4)
LYMPHOCYTES NFR BLD AUTO: 7.6 %
MAGNESIUM SERPL-MCNC: 1.6 MG/DL (ref 1.6–2.6)
MCH RBC QN AUTO: 29.4 PG (ref 26–34)
MCHC RBC AUTO-ENTMCNC: 31.4 G/DL (ref 31–37)
MCV RBC AUTO: 93.5 FL
MONOCYTES # BLD AUTO: 0.68 X10(3) UL (ref 0.1–1)
MONOCYTES NFR BLD AUTO: 10.5 %
NEUTROPHILS # BLD AUTO: 4.95 X10 (3) UL (ref 1.5–7.7)
NEUTROPHILS # BLD AUTO: 4.95 X10(3) UL (ref 1.5–7.7)
NEUTROPHILS NFR BLD AUTO: 76.4 %
OSMOLALITY SERPL CALC.SUM OF ELEC: 285 MOSM/KG (ref 275–295)
PLATELET # BLD AUTO: 197 10(3)UL (ref 150–450)
POTASSIUM SERPL-SCNC: 3.3 MMOL/L (ref 3.5–5.1)
PROT SERPL-MCNC: 5.4 G/DL (ref 5.7–8.2)
RBC # BLD AUTO: 3.54 X10(6)UL
SODIUM SERPL-SCNC: 139 MMOL/L (ref 136–145)
WBC # BLD AUTO: 6.5 X10(3) UL (ref 4–11)

## 2024-09-18 PROCEDURE — 99232 SBSQ HOSP IP/OBS MODERATE 35: CPT | Performed by: HOSPITALIST

## 2024-09-18 PROCEDURE — 99254 IP/OBS CNSLTJ NEW/EST MOD 60: CPT | Performed by: CLINICAL NURSE SPECIALIST

## 2024-09-18 PROCEDURE — 99233 SBSQ HOSP IP/OBS HIGH 50: CPT | Performed by: INTERNAL MEDICINE

## 2024-09-18 RX ORDER — HYDROMORPHONE HYDROCHLORIDE 1 MG/ML
0.8 INJECTION, SOLUTION INTRAMUSCULAR; INTRAVENOUS; SUBCUTANEOUS EVERY 2 HOUR PRN
Status: DISCONTINUED | OUTPATIENT
Start: 2024-09-18 | End: 2024-09-19

## 2024-09-18 RX ORDER — POTASSIUM CHLORIDE 1500 MG/1
40 TABLET, EXTENDED RELEASE ORAL ONCE
Status: COMPLETED | OUTPATIENT
Start: 2024-09-18 | End: 2024-09-18

## 2024-09-18 RX ORDER — HYDROMORPHONE HYDROCHLORIDE 1 MG/ML
0.4 INJECTION, SOLUTION INTRAMUSCULAR; INTRAVENOUS; SUBCUTANEOUS EVERY 2 HOUR PRN
Status: DISCONTINUED | OUTPATIENT
Start: 2024-09-18 | End: 2024-09-19

## 2024-09-18 RX ORDER — PREGABALIN 25 MG/1
25 CAPSULE ORAL 2 TIMES DAILY
Status: DISCONTINUED | OUTPATIENT
Start: 2024-09-18 | End: 2024-09-20

## 2024-09-18 RX ORDER — LOSARTAN POTASSIUM 100 MG/1
100 TABLET ORAL DAILY
Qty: 30 TABLET | Refills: 0 | Status: ON HOLD | OUTPATIENT
Start: 2024-09-18 | End: 2024-10-18

## 2024-09-18 RX ORDER — OXYCODONE HYDROCHLORIDE 10 MG/1
10 TABLET ORAL EVERY 4 HOURS PRN
Status: DISCONTINUED | OUTPATIENT
Start: 2024-09-18 | End: 2024-09-20

## 2024-09-18 RX ORDER — MAGNESIUM OXIDE 400 MG/1
400 TABLET ORAL ONCE
Status: COMPLETED | OUTPATIENT
Start: 2024-09-18 | End: 2024-09-18

## 2024-09-18 RX ORDER — SERTRALINE HYDROCHLORIDE 100 MG/1
200 TABLET, FILM COATED ORAL DAILY
Status: ON HOLD | COMMUNITY
Start: 2024-09-18 | End: 2024-09-30

## 2024-09-18 RX ORDER — LOSARTAN POTASSIUM 100 MG/1
100 TABLET ORAL DAILY
Status: DISCONTINUED | OUTPATIENT
Start: 2024-09-18 | End: 2024-09-20

## 2024-09-18 RX ORDER — HYDROMORPHONE HYDROCHLORIDE 1 MG/ML
0.6 INJECTION, SOLUTION INTRAMUSCULAR; INTRAVENOUS; SUBCUTANEOUS EVERY 2 HOUR PRN
Status: DISCONTINUED | OUTPATIENT
Start: 2024-09-18 | End: 2024-09-19

## 2024-09-18 NOTE — CDS QUERY
Dear Dr. Brito,     Clinical information includes a diagnosis of DEEP VEIN THROMBOSIS (DVT). Based on your judgement and review of the clinical findings, can you please specify further the diagnosis treated?    [   ] Chronic deep vein thrombosis  [   ] Other (please specify):      CLINICAL INFORMATION FROM THE MEDICAL RECORD    Risk Factors:  Metastatic esophageal carcinoma     Clinical Indicators:   9/15/2024 H&P Assessment / Plan: Previous PE/DVT. Resume Eliquis, will hold for tonight in case of procedure   9/16/2024 Hematology/Oncology Assessment / Plan: PE/RLE DVT: Dx 6/18/24: on Eliquis indefinitely. Currently on hold for possible ERCP    Treatment:   Hematology/Oncology consult  Eliquis       Use of terms such as suspected, possible, or probable (associated with a specific diagnosis that is being evaluated, monitored, or treated as if it exists) are acceptable and can be coded in the inpatient setting, when documented at the time of discharge.    Please add any additional documentation to your progress note and continue to document this through discharge. For questions regarding this query, please contact Clinical : Alida Rodriguez RN at #[phone].    THIS FORM IS A PERMANENT PART OF THE MEDICAL RECORD

## 2024-09-18 NOTE — PROGRESS NOTES
[facility] Progress Note   Length of Stay: 3    Subjective:   -s/p ERCP  -Feels better today. Hopefully home today    Oncology History   -2018: Per report had a normal EGD and colonoscopy     -June 2022: He met with GI due to new onset dysphagia which started about 1 month prior to his visit.  He had an esophagram with a focal abrupt narrowing with irregular margins in the distal one third of the esophagus extending to the GE junction.  He also had an episode of food impaction. He has also lost about 15 lb. He was a heavy smoker from age 14 to about 40 (1.5 PPD). Also with alcohol use currently. Mother with a history of breast and colon cancer.  EGD and EUS with Dr. Yadav on 6/7/2022: Severe esophagitis with a concerning mass extending 37-39 centimeters from the incisors.  Nonobstructive mass.  By EUS uT3N1 disease.  Pathology showed invasive moderate to poorly differentiated adenocarcinoma.  HER2 amplified by FISH. CT CAP done at [facility] on 6/16/22: Results not loaded to PACs yet.  CA 19-9 from the same day was 107.4.  CEA normal. PET/CT on 6/20/2022: Increased distal esophageal uptake with an SUV of 14.4.  Concern for shreya metastases.     -Concurrent chemoradiation with carboplatin AUC2 plus paclitaxel 50 mg/m2: July 2022-August 2022 with  down (280-->30). Post treatment PET/CT showed improvement.      -Surgery with Dr. Lu on 9/30/22: ypT1b pN0. Recovery has been complicated by an esophageal leak,      -I met with him on 12/12/22. We discussed adjuvant opdivo for 1 year. His  was elevated to 48 and repeat was 64. CT CAP was recommended.      -He was admitted on 12/25/22 for abdominal pain. He had a POEM procedure done. He was also noted to have a RLL consolidation. He was seen by pulmonary, based on imaging an outpatient PET/CT was recommended and a biopsy of the most FDG avid lesion would be considered. Noted to have CAD and an outpatient evaluation was  recommended. Outpatient PET/CT done on 1/4/23 was reviewed in tumor board and there was no focal area of concern and adjuvant immunotherapy recommended.      -Adjuvant opdivo:01/2023-03/2023. PET/CT in in 04/2023 showed uptake in the pancreatic head and tail. Also with some uptake in liver. EGD/EUS on 4/27/23 with Dr. Ritchie: Pancreatic head mass positive for adenocarcinoma: Comparison to previous esophageal cancer shows similarity but IHC in non-specific. Her 2 IHC was 2+ on this specimen but FISH was negative.  Given discordant results on initial HER2 and follow-up HER2 we decided to proceed with chemotherapy with immunotherapy with HER2 directed therapy.     -Chemo + Herceptin + Immunotherapy: He received 7 cycles of FOLFOX + Herceptin + Immunotherapy (05/2023-09/2023). Imaging after C5 showed a favorable response. After C7, we held oxaliplatin due to neuropathy and he was started on maintenance herceptin + IO maintenance. Signatera was negative 09/11/23.     -Herceptin + IO Maintenance: He received 3 cycles from 09/25/23-10/31/23. Treatment was delayed due to hospitalizations.      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 Due to new findings-restarting FOLFOX discussed.      -admitted from 1/8/24-1/13/24 for persistent dysphagia and bronchesophageal stent.      -1/17/24: EGD with fistula closure with ASD occluder and removal of the bronchial stent.     -FOLFOX + Herceptin + Opdivo restarted: 3 cycles: 02/2024-03/2024     -Treatment delayed again for recurrent hospital admissions     -He underwent a Latissimus dorsi flap reconstruction for his bronchoesophageal fistula on 5/1/24      -PET/CT 5/15/24: at least 3 areas of uptake in the right hepatic lobe, uptake in pancrease and retroperitoneum. Post-operative uptake in chest wall.      -FOLFOX + Herceptin + Nivolumab Restarted: 6/11/2024 until 8/14/2024.  He received 5 cycles.  A PET scan after 5 cycles showed overall progression of metastatic disease with new gastrohepatic and peripancreatic lymph nodes along with enlargement of pancreatic and hepatic metastases.  There were also new lung nodules.  A CT-guided liver biopsy on 9/10/2024 was done which was positive for metastatic adenocarcinoma and the immunoprofile is compatible with the patient's esophageal primary.     -He was admitted on 9/15/24 for abdominal pain.  Labs showed elevated AST/ALT and T bili (6.7). CTA CAP showed progression of pancreatic and hepatic masses. There is intra-extra hepatic biliary ductal dilation. Subpleural reticular opacities noted-inflammatory or interstitial process? Hi Res CT recommended. He underwent an ERCP which showed a CBD stricture with biliary sphincterectomy and CBD, PD bile duct stent placement with Dr. Ritchie.     ROS: 12 Point ROS completed and pertinent positives are above     Objective:    heparin  5,000 Units Subcutaneous Q8H INNA    cetirizine  10 mg Oral Daily    [Held by provider] apixaban  5 mg Oral BID    sertraline  200 mg Oral Daily    pregabalin  25 mg Oral Nightly    pantoprazole  40 mg Oral QAM AC    OLANZapine  5 mg Oral Nightly    metoprolol tartrate  25 mg Oral 2x Daily(Beta Blocker)    furosemide  20 mg Oral Daily    losartan  50 mg Oral Daily       heparin    dicyclomine    oxyCODONE    ondansetron    acetaminophen    melatonin    glycerin-hypromellose-    sodium chloride    polyethylene glycol (PEG 3350)    sennosides    bisacodyl    fleet enema    ondansetron    prochlorperazine    HYDROmorphone **OR** HYDROmorphone **OR** HYDROmorphone    Physical Exam  Vitals:    09/18/24 0436   BP: (!) 183/81   Pulse: 61   Resp: 18    Temp: 97.5 °F (36.4 °C)     General: NAD, AOX3, jaundiced  HEENT: clear op, mmm, no jvd. EOM intact, positive scleral icterus   CV: RRR S1S2 no murmurs  Extremities: No edema  Lungs: CTAB, no increased work of breathing  Abd: soft nt nd +BS no hepatosplenomegaly, surgical scars  Neuro: CN: II-XII grossly intact  Psych: Normal Mood and affect     Labs/Imaging/Path:  Lab Results   Component Value Date    WBC 5.9 09/17/2024    HGB 10.9 (L) 09/17/2024    HCT 33.5 (L) 09/17/2024    MCV 92.8 09/17/2024    .0 09/17/2024       Recent Labs   Lab 09/15/24  1225 09/16/24  0653 09/17/24  0624   * 96 89   BUN 9 9 7*   CREATSERUM 0.64* 0.53* 0.51*   CA 8.9 8.7 8.7   ALB 3.3 3.0* 3.1*    137 137   K 4.1 4.1 3.8    104 103   CO2 29.0 30.0 26.0   ALKPHO 498* 465* 543*   * 209* 164*   * 358* 293*   BILT 6.7* 6.7* 7.2*   TP 6.0 5.4* 5.6*       Imaging: Reviewed     Assessment and Plan:   Jaundice 2/2 elevated bilirubin which is likely related to metastatic disease/CBD stricture. He is s/p ERCP on 9/17/24 with CBD, PD stent placement.      Metastatic esophageal cancer  -Most recent imaging is consistent with metastatic disease progression.  A repeat HER2 on his liver biopsy is pending.  If HER2 positive we will proceed with Enhertu.  If HER2 negative we will consider FOLFIRI plus Cyramza versus paclitaxel and Cyramza     PE/RLE DVT: Dx 6/18/24: on eliquis indefinitely. Currently on hold for possible ERCP    Ok to discharge from an oncology standpoint     COURTNEY Foster MD  [facility]

## 2024-09-18 NOTE — CONSULTS
[facility]   part of [facility]  Palliative Care Initial Consult Note    Dontae Cortez Jr. Patient Status:  Inpatient    10/15/1970 MRN [id number]   Location [facility] 4NW-A Attending Quinton Concepcion,    Hosp Day # 3 PCP Adrian Horowitz MD     Date of Consult: 2024  Patient seen at: [facility]    Reason for Consultation: Consult ordered by:: Dr. Concepcion for evaluation of Palliative Care needs and Uncontrolled symptoms. Patient and spouse are familiar with Palliative Care service as they have been cared for by Ana Luisa Bravo NP OP for symptom management.     Subjective     History of Present Illness: Dontae Cortez Jr. is a 53 year old male with history of metastatic esophageal and pancreatic cancer who was admitted on 9/15/2024 for jaundice progressing a few days PTA. Work up in our hospital revealed elevated LFTs and CT as noted below with progression of disease in the liver and pancreatic mass. He underwent ERCP with biliary sphincterotomy and CBD and PD stent placement .  History was obtained from [facility], the patient and spouse.      Today is day #3 of hospitalization.     When I entered the room, the patient was  sitting up in bed and awake & alert.  spouse  present at bedside.      Review of Systems:   Symptoms(s): Pain    Pain assessment:   24 hour (0508-6731) OME total: 182.5mg  (IV dilaudid 8mg + OxyIR 15mg)  Current pain: 10/10 prior to IV dilaudid and down to 5-6/10 after described as constant, sharp, located RUQ abd, unable to state what makes pain worse other than pain meds wearing off, alleviated by IV dilaudid the best but notes short duration, OxyIR also helpful  Pain goal: 5-6/10    Dyspnea: denies  Cough: none noted  Nausea: denies  Appetite: good, has been eating regular foods  Pain: as above  Constipation: no  Last BM:   Bowel Movement         2024  1126             Stool Count Calculated for I/O: 1    Stool (mL): 2 mL            Palliative Care  Social History:   Marital Status:   Children:  deferred  Living Situation Prior to Admit: Home  Does Patient Live Alone: No but spouse works during the day   Is Patient Confused: No  Occupational History:  has [employer] license but can't work due to opioids/pain/disease    Substance History:   reports that he quit smoking about 11 years ago. His smoking use included cigarettes. He started smoking about 38 years ago. He has a 27 pack-year smoking history. He has never used smokeless tobacco.  reports no history of alcohol use.  reports no history of drug use.    Spiritual Assessment:   Yazidism - Parish Not Listed    Past Medical History/Past Surgical History:   This is the 7h hospitalization in the past 6 months.    Medical History: obtained from [facility]  Past Medical History:    Back problem    Belching    Black stools    Borderline diabetes    Dx in 8/2013 - HgA1C 6.2%    C. difficile diarrhea    pt treated and without symptoms    Chest pain    Coronary artery disease    On 8/16/13: CABG x 4 with LIMA to LAD and SVG to diagonal, OM and PDA    Decorative tattoo    Depression    Difficult intubation    h/o esophagectomy for CA; developed esophagobronchial vistula    Disorder of liver    LIVER CA    Esophageal cancer (HCC)    completed chemo    Esophageal reflux    Essential hypertension    Exposure to medical diagnostic radiation    last tx 8/18/2022    Frequent urination    Gastroparesis    Heartburn    High blood pressure    High cholesterol    Found when I had quadruple bypass    History of COVID-19    asymptomatic - pt was dx during a hospitalization for another diagnosis. No continued symptoms    History of stomach ulcers    Hyperlipidemia    Hyperlipidemia LDL goal < 70    Indigestion    Morbid obesity with BMI of 40.0-44.9, adult (HCC)    Muscle weakness    Nausea vomiting and diarrhea    Nontoxic multinodular goiter    Dx in 8/2013: pt was told that imaging showed thyroid cysts per PCP    Pancreatic cancer  (HCC)    last dose 12/4/2023 is scheduled for another round 12/27/23    Peripheral vascular disease (HCC)    pt denies    Personal history of antineoplastic chemotherapy    for esophageal cancer/completed    Personal history of antineoplastic chemotherapy    pancreatic cancer    Personal history of antineoplastic chemotherapy    Last treatment 3/12/24    Problems with swallowing    Pulmonary nodules    Dx in 8/2013: CT chest showed small bilateral fissural-based lung nodules less than 1 cm    S/P CABG x 4    On 8/16/13: CABG x 4 with LIMA to LAD and SVG to diagonal, OM and PDA    Shortness of breath    when coughing; no oxygen    Vomiting     Past Surgical History:   Procedure Laterality Date    Appendectomy      Appendectomy      Arthroscopy of joint unlisted      right shoulder    Cabg      On 8/16/13: CABG x 4 with LIMA to LAD and SVG to diagonal, OM and PDA    Cardiac cath lab      On 8/14/2013: cardiac cath showed 3-vessel disease    Other surgical history      1.       Laparoscopic robotic-assisted esophagogastrectomy.    Port, indwelling, imp         Family History: obtained from [facility]  Family History   Problem Relation Age of Onset    Cancer Mother         breast and colon     Diabetes Neg        Allergies:  Allergies   Allergen Reactions    Oxaliplatin HIVES     Shaking        Medications:     Current Facility-Administered Medications:     losartan (Cozaar) tab 100 mg, 100 mg, Oral, Daily    oxyCODONE immediate release tab 10 mg, 10 mg, Oral, Q4H PRN    HYDROmorphone (Dilaudid) 1 MG/ML injection 0.4 mg, 0.4 mg, Intravenous, Q2H PRN **OR** HYDROmorphone (Dilaudid) 1 MG/ML injection 0.6 mg, 0.6 mg, Intravenous, Q2H PRN **OR** HYDROmorphone (Dilaudid) 1 MG/ML injection 0.8 mg, 0.8 mg, Intravenous, Q2H PRN    pregabalin (Lyrica) cap 25 mg, 25 mg, Oral, BID    cetirizine (ZyrTEC) tab 10 mg, 10 mg, Oral, Daily    heparin (Porcine) 100 Units/mL lock flush 500 Units, 5 mL, Intravenous, PRN    apixaban (Eliquis) tab  5 mg, 5 mg, Oral, BID    dicyclomine (Bentyl) cap 10 mg, 10 mg, Oral, Q6H PRN    sertraline (Zoloft) tab 200 mg, 200 mg, Oral, Daily    pantoprazole (Protonix) DR tab 40 mg, 40 mg, Oral, QAM AC    ondansetron (Zofran-ODT) disintegrating tab 8 mg, 8 mg, Oral, Q8H PRN    OLANZapine (ZyPREXA) tab 5 mg, 5 mg, Oral, Nightly    metoprolol tartrate (Lopressor) tab 25 mg, 25 mg, Oral, 2x Daily(Beta Blocker)    furosemide (Lasix) tab 20 mg, 20 mg, Oral, Daily    acetaminophen (Tylenol Extra Strength) tab 500 mg, 500 mg, Oral, Q4H PRN    melatonin tab 3 mg, 3 mg, Oral, Nightly PRN    glycerin-hypromellose- (Artificial Tears) 0.2-0.2-1 % ophthalmic solution 1 drop, 1 drop, Both Eyes, QID PRN    sodium chloride (Saline Mist) 0.65 % nasal solution 1 spray, 1 spray, Each Nare, Q3H PRN    polyethylene glycol (PEG 3350) (Miralax) 17 g oral packet 17 g, 17 g, Oral, Daily PRN    sennosides (Senokot) tab 17.2 mg, 17.2 mg, Oral, Nightly PRN    bisacodyl (Dulcolax) 10 MG rectal suppository 10 mg, 10 mg, Rectal, Daily PRN    fleet enema (Fleet) rectal enema 133 mL, 1 enema, Rectal, Once PRN    ondansetron (Zofran) 4 MG/2ML injection 4 mg, 4 mg, Intravenous, Q6H PRN    prochlorperazine (Compazine) 10 MG/2ML injection 5 mg, 5 mg, Intravenous, Q8H PRN    Functional Status History:  ADLs: bathing or showering, dressing, getting in and out of bed or a chair, walking, using the toilet, and eating  - Independent  IADLs: use the phone, shop for groceries, meal preparation, manage medicines, clean living area, use transportation by self, manage money  -  deferred    Palliative Performance Scale:   Prior to admission: (pt/family reported) 90 %  Observed during hospitalization: 90 %  % Ambulation Activity Level Self-Care Intake Consciousness   100 Full  Normal  No Disease Full Normal Full   90 Full  Normal  Some Disease Full Normal Full   80 Full  Normal w/effort  Some Disease Full Normal or reduced Full   70 Reduced  Can't Perform Job  Some  Disease Full Normal or reduced Full   60 Reduced  Can't Perform Hobby   Significant Disease Occ Assist Normal or reduced Full or confused   50 Mainly sit/lie Can't do any work  Extensive Disease Partial Assist Normal or reduced Full or confused   40 Mainly in bed Can't do any work  Extensive Disease Mainly Assist Normal or reduced Full or confused   30 Bed Bound Can't do any work  Extensive Disease Max Assist  Total Care Reduced  Drowsy/confused   20 Bed Bound Can't do any work  Extensive Disease Max Assist  Total Care Minimal  Drowsy/confused   10 Bed Bound Can't do any work  Extensive Disease Max Assist  Total Care Mouth Care  Drowsy/confused   0 Death        Objective      Vital Signs:  Blood pressure (!) 166/78, pulse 51, temperature 97.8 °F (36.6 °C), temperature source Oral, resp. rate 18, height 6' 2\" (1.88 m), weight 155 lb (70.3 kg), SpO2 99%.  Body mass index is 19.9 kg/m².    Physical Exam:  General: Alert & Awake. In no apparent respiratory distress. Body habitus Thin   HEENT: AT/NC. No gross focal deficits. MMM   Lungs: Normal effort on RA  Abdomen: Soft, tender, distended, normal bowel sounds X 4 quadrants   Extremities: No LE edema present  Neurologic: Alert and oriented to person, place, time, and situation   Psychiatric: Mood  was frustrated initially but then more pleasant    Skin: Warm and dry.    Hematology:  Lab Results   Component Value Date    WBC 6.5 09/18/2024    HGB 10.4 (L) 09/18/2024    HCT 33.1 (L) 09/18/2024    .0 09/18/2024       Coags:  Lab Results   Component Value Date    PT 20.4 (H) 01/30/2014    INR 1.17 09/10/2024    PTT 80.5 (H) 06/19/2024       Chemistry:  Lab Results   Component Value Date    CREATSERUM 0.45 (L) 09/18/2024    BUN 7 (L) 09/18/2024     09/18/2024    K 3.3 (L) 09/18/2024     09/18/2024    CO2 30.0 09/18/2024    GLU 89 09/18/2024    CA 8.6 (L) 09/18/2024    ALB 3.0 (L) 09/18/2024    ALKPHO 514 (H) 09/18/2024    BILT 3.5 (H) 09/18/2024    TP 5.4  (L) 09/18/2024    AST 93 (H) 09/18/2024     (H) 09/18/2024    DDIMER 0.38 12/19/2023    BNP 33 08/15/2013    MG 1.6 09/18/2024    PHOS 2.9 03/15/2024    TROP <0.046 07/18/2017       Imaging:  XR ENDO BILE DUCT (CPT=74328)    Result Date: 9/17/2024  CONCLUSION:  Intraoperative fluoroscopic guidance for ERCP as above.   LOCATION:  [facility]   Dictated by (CST): Immanuel Matthews MD on 9/17/2024 at 4:48 PM     Finalized by (CST): Immanuel Matthews MD on 9/17/2024 at 4:48 PM        Summary of Discussion      I discussed reason for palliative care consultation with Patient and Spouse to provide continuity for symptom management care as he transitions from the inpatient to OP setting with Ana Luisa Bravo NP. We discussed his high level of pain requiring frequent doses of 0.8mg IV dilaudid over the last 3 days. He shared that he would like to go home but then states how bad his pain is a short time after the IV dilaudid. Long discussion emphasizing the importance of staying in the hospital to determine his new pain regimen needs as PTA he was stable on OxyIR 5mg 3-4 tabs per day. Ronen shared his pain has been progressing over the last 3 weeks or so and sometimes he has had to take 10mg during the night or in the morning if the pain was worse. He said the Rx said he could take 1-2 tabs but he tried to limit them to 1 tab.   We discussed his previous sensitivities and SE to other medications including morphine and gabapentin (hallucinations) and improvement/resolving when stopped.     Prognostic awareness/understanding: Fair.  Patient inquiring why his pain is still so bad after the procedure/stenting? Spouse spoke up to acknowledge the cancer is still there and is progressing. We discussed the stents will not eliminate his pain and may event be another source of pain.   Unsure if prognosis has been discussed but they are aware of plan for modified medical oncology POC in light of new findings.      Hopes/goals:   Resume  treatment  Improve pain control    Symptom Management:   Pain: After d/w Ana Luisa Lawrence:   Will titrate OxyIR to 10mg q4hrs prn and encourage as first line.   Due to his high OME over the last several days he may likely need transition to long acting OxyContin with OxyIR for breakthough but due to prior opioid sensitivities/SE will titrate slowly.   Continue IV dilaudid for breakthrough but try to use lower dose. Panel adjusted to 0.8-0.6-0.4  Increase Pregabalin 25 to BID (from at bedtime), titrate slowly due to prior SE from gabapentin.     Fears/concerns:   Spouse voiced concern about Ronen being alone during the day while she works and if he is taking the pain meds appropriately. We discussed setting out allowed number of pills during the day and at night so she could monitor however he seems resistant to this and felt he could keep track himself. We discussed writing down when meds were taken.   Provided emotional support to Patient and Spouse.    Advance Care Planning counseling and discussion:   HCPOA:  Document on file Yes [x] No []. Requested for file []  Healthcare Agent Appointed: Yes  Healthcare Agent's Name: Alysha Mcduffie - wife     Patient's wishes noted on form:  QOL [x] quantity / prolongation [].    Code Status:  Full Code discussion deferred    Problem List:  Principal Problem:    Transaminitis  Active Problems:    Biliary obstruction (HCC)    Hyperbilirubinemia    Esophageal adenocarcinoma (HCC)      Assessment and Recommendations      Goals of care: ongoing   Continue OP medical oncology tx with modified POC per Dr. Foster in light of new findings.   Improved symptom/pain management. Aware discharge is appropriate when he no longer requires IV pain meds.     Advanced Care Planning:  Code Status: Full Code, discussion deferred due to focus on symptoms  HCPOA:  on file reflecting QOL vs quantity . Healthcare Agent's Name: Alysha Mcduffie - wife  Symptoms  Pain: After d/w Ana Luisa Lawrence:   Will titrate OxyIR  to 10mg q4hrs prn and encourage as first line.   Due to his high OME over the last several days he may likely need transition to long acting OxyContin with OxyIR for breakthough if pain persists but due to prior opioid sensitivities/SE will titrate slowly.   Continue IV dilaudid for breakthrough but try to use lower dose. Panel adjusted to 0.8-0.6-0.4  Increase Pregabalin 25 to BID (from at bedtime), titrate slowly due to prior SE from gabapentin.     Discussed today's visit with   Dr. Foster, RN, and hospitalist.    Palliative Care Follow Up: Palliative care team will Continue to follow while inpatient.  Palliative care follow up at discharge: Established with [facility].    Thank you for allowing Palliative Care services to participate in the care of Dontae Cortez Jr..    A total of 55 minutes were spent on this consult, which included all of the following: chart review, direct face to face contact, history taking, physical examination, counseling and coordinating care, and documentation     Melissa Castaneda, APRN  9/18/2024  3:51 PM  Palliative Care Services    The 21st Century Cures Act makes medical notes like these available to patients in the interest of transparency. Please be advised this is a medical document. Medical documents are intended to carry relevant information, facts as evident, and the clinical opinion of the practitioner. The medical note is intended as peer to peer communication and may appear blunt or direct. It is written in medical language and may contain abbreviations or verbiage that are unfamiliar.

## 2024-09-18 NOTE — PROGRESS NOTES
[facility]   part of [facility]     Hospitalist Progress Note     Dontae Cortez . Patient Status:  Inpatient    10/15/1970 MRN [id number]   [facility] 4NW-A Attending Jan Brito MD   Hosp Day # 3 PCP Adrian Horowitz MD     Chief Complaint: Abdominal pain    Subjective:     Patient seen and examined. Abdominal pain persists.     Objective:    Review of Systems:   A comprehensive review of systems was completed; pertinent positive and negatives stated in subjective.    Vital signs:  Temp:  [97.3 °F (36.3 °C)-97.8 °F (36.6 °C)] 97.8 °F (36.6 °C)  Pulse:  [51-69] 51  Resp:  [16-20] 18  BP: (146-183)/(78-98) 166/78  SpO2:  [94 %-100 %] 99 %    Physical Exam:    General: No acute distress, pleasant   Respiratory: No wheezes, no rhonchi  Cardiovascular: S1, S2, regular rate and rhythm  Abdomen: Soft, TTP, non-distended, positive bowel sounds  Neuro: No new focal deficits.   Extremities: No edema    Diagnostic Data:    Labs:  Recent Labs   Lab 09/15/24  1225 24  0653 24  0624 24  0705   WBC 6.4 5.1 5.9 6.5   HGB 11.9* 10.7* 10.9* 10.4*   MCV 92.7 93.4 92.8 93.5   .0 169.0 180.0 197.0       Recent Labs   Lab 24  0653 24  0624 24  0706   GLU 96 89 89   BUN 9 7* 7*   CREATSERUM 0.53* 0.51* 0.45*   CA 8.7 8.7 8.6*   ALB 3.0* 3.1* 3.0*    137 139   K 4.1 3.8 3.3*    103 103   CO2 30.0 26.0 30.0   ALKPHO 465* 543* 514*   * 164* 93*   * 293* 225*   BILT 6.7* 7.2* 3.5*   TP 5.4* 5.6* 5.4*       Estimated Creatinine Clearance: 186.6 mL/min (A) (based on SCr of 0.45 mg/dL (L)).    No results for input(s): \"TROP\", \"TROPHS\", \"CK\" in the last 168 hours.    No results for input(s): \"PTP\", \"INR\" in the last 168 hours.                 Microbiology    No results found for this visit on 09/15/24.      Imaging: Reviewed in Epic.    Medications:    losartan  100 mg Oral Daily    heparin  5,000 Units Subcutaneous Q8H INNA    cetirizine  10 mg  Oral Daily    [Held by provider] apixaban  5 mg Oral BID    sertraline  200 mg Oral Daily    pregabalin  25 mg Oral Nightly    pantoprazole  40 mg Oral QAM AC    OLANZapine  5 mg Oral Nightly    metoprolol tartrate  25 mg Oral 2x Daily(Beta Blocker)    furosemide  20 mg Oral Daily       Assessment & Plan:      #Elevated liver enzymes secondary to malignant biliary obstruction s/p ERCP with biliary sphincterotomy and CBD/PD stent placement 9/17  GI following  LFTs improving  Pain control, anti-emetics      #Metastatic adenocarcinoma  Had liver biopsy done on 9/10/24 - awaiting final biopsy report      # Metastatic esophageal carcinoma  # Metastatic pancreatic carcinoma  s/p esophagectomy and 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 and ASD occluder device   CT abd showing pancreatic mass that progressed in size, progressed hepatic metastasis   Hem/onc following    #Cancer related pain  -Without significant improvement following treatment of biliary obstruction  -Palliative care consulted      #Previous PE/DVT  Resume DOAC tonight     #Essential HTN - losartan, metoprolol, furosemide   #DLD  #GERD/PUD - PPI  #CAD s/p CABG  #Depression/Anxiety - Zoloft, Olanzapine     Quinton Jamey, DO    Supplementary Documentation:     Quality:  DVT Mechanical Prophylaxis:     Early ambuation  DVT Pharmacologic Prophylaxis   Medication    heparin (Porcine) 5000 UNIT/ML injection 5,000 Units    heparin (Porcine) 100 Units/mL lock flush 500 Units    [Held by provider] apixaban (Eliquis) tab 5 mg                Code Status: Full Code  Neumann: No urinary catheter in place  Neumann Duration (in days):   Central line:    SHUBHAM: 9/18/2024    Discharge is dependent on: pain improvement   At this point Mr. Cortez is expected to be discharge to: Home    The 21st Century Cures Act makes medical notes like these available to patients in the interest of transparency. Please be advised this is a medical document. Medical documents  are intended to carry relevant information, facts as evident, and the clinical opinion of the practitioner. The medical note is intended as peer to peer communication and may appear blunt or direct. It is written in medical language and may contain abbreviations or verbiage that are unfamiliar.     Dietitian Malnutrition Assessment    Evaluation for Malnutrition: Criteria for severe malnutrition diagnosis- acute illness/injury related to Wt loss greater than 2% in 1 week., Energy intake less than 50% for greater than 5 days.                 RD Malnutrition Care Plan: Liberalized diet., Encouraged increased PO intake., Encouraged small frequent meals with emphasis on high calorie/high protein.    Body Fat/Muscle Mass:          Physician Assessment     Patient has a diagnosis of severe malnutrition

## 2024-09-18 NOTE — PAYOR COMM NOTE
CONTINUED STAY REVIEW    Payor: [employer]  Subscriber #:  [id number]  Authorization Number: [id number]    Admit date: 9/15/24  Admit time:  5:25 PM    REVIEW DOCUMENTATION: 2024    Dontae Cortez Jr. Patient Status:  Inpatient    10/15/1970 MRN [id number]   Location [facility] 4NW-A Attending Quinton Concepcion, DO   Hosp Day # 3 PCP Adrian Horowitz MD      Reason for Consultation   Biliary obstruction      Subjective      -Feels better today, had some mild abd pain post-procedure, responded to meds  -Tolerated dinner well  -Passing flatus + BM               Objective:      BP (!) 183/81 (BP Location: Right arm)   Pulse 61   Temp 97.5 °F (36.4 °C) (Oral)   Resp 18   Ht 6' 2\" (1.88 m)   Wt 155 lb (70.3 kg)   SpO2 98%   BMI 19.90 kg/m²   Gen: AAOx2  CV: RRR with normal S1 / S2  Resp: CTA bilaterally  Abd: (+)BS, soft, non-tender, non-distended; no rebound or guarding  Ext: No edema or cyanosis  Skin: Warm and dry      Labs/Imaging      LABRCNTIP[PGLU:5@  No results for input(s): \"INR\" in the last 168 hours.                Recent Labs    Lab 09/15/24  1225 09/16/24  0653 09/17/24  0624 09/18/24  0705    WBC 6.4 5.1 5.9 6.5    HGB 11.9* 10.7* 10.9* 10.4*    .0 169.0 180.0 197.0                  Recent Labs    Lab 09/15/24  1225 09/16/24  0653 24  0706     137 137 139    K 4.1 4.1 3.8 3.3*     104 103 103    CO2 29.0 30.0 26.0 30.0    BUN 9 9 7* 7*    CREATSERUM 0.64* 0.53* 0.51* 0.45*                  Recent Labs    Lab 09/15/24  1225 09/16/24  0653 24  0706    * 358* 293* 225*    * 209* 164* 93*       XR ENDO BILE DUCT (CPT=74328)     Result Date: 2024  CONCLUSION:  Intraoperative fluoroscopic guidance for ERCP as above.   LOCATION:  [facility]   Dictated by (CST): Immanuel Matthews MD on 2024 at 4:48 PM     Finalized by (CST): Immanuel Matthews MD on 2024 at 4:48 PM        CTA CHEST + CT ABD (W) + CT  PEL (W) (CPT=71275/22953)     Result Date: 9/15/2024  CONCLUSION:  1. Pancreatic masses appear progressed in size compared to the prior examination. 2. Hepatic metastatic disease appears progressed in size compared to the prior exam.  New intra and extrahepatic biliary ductal dilatation. 3. Status post esophagectomy with gastric pull-through. 4. Subpleural reticular opacities in the lungs may represent an inflammatory or interstitial process.  This may be further evaluated with high-resolution CT of the chest if indicated.   LOCATION:  [id number]   Dictated by (CST): Shar Rousseau MD on 9/15/2024 at 2:53 PM     Finalized by (CST): Shar Rousseau MD on 9/15/2024 at 3:04 PM            AST   Date/Time Value Ref Range Status   09/18/2024 07:06 AM 93 (H) <34 U/L Final   09/23/2013 10:35 AM 13 (L) 15 - 41 U/L Final            ALT   Date/Time Value Ref Range Status   09/18/2024 07:06  (H) 10 - 49 U/L Final   09/23/2013 10:35 AM 29 17 - 63 U/L Final            BUN   Date/Time Value Ref Range Status   09/18/2024 07:06 AM 7 (L) 9 - 23 mg/dL Final   09/23/2013 10:35 AM 10 8 - 20 mg/dL Final            Blood Urea Nitrogen   Date/Time Value Ref Range Status   02/08/2021 11:40 AM 14.0 6.0 - 20.0 mg/dL Final                   Assessment       54 y/o male with a h/o metastatic adenocarcinoma of the esophagus presenting with biliary obstruction, RUQ pain, jaundice. Biliary obstruction due to extrahepatic mets  -Status post ERCP with stenting 9/17/24 by Dr. Ritchie  -No apparent post-procedural complications.                Plan      -OK for dc home today  -KUB in 1 week to be coordinated through [facility]               Kira Cardona MD  9:07 AM  9/18/2024  [facility]    MEDICATIONS ADMINISTERED IN LAST 1 DAY:  cetirizine (ZyrTEC) tab 10 mg       Date Action Dose Route User    9/18/2024 0845 Given 10 mg Oral Agata Godinez, RN          fentaNYL (Sublimaze) 50 mcg/mL injection 50 mcg       Date Action Dose Route User     9/17/2024 1605 Given 50 mcg Intravenous CepCristel faye MD    9/17/2024 1600 Given 50 mcg Intravenous Cristel White MD          furosemide (Lasix) tab 20 mg       Date Action Dose Route User    9/18/2024 0845 Given 20 mg Oral Agata Godinez RN          heparin (Porcine) 100 Units/mL lock flush 500 Units       Date Action Dose Route User    9/18/2024 1133 Given 500 Units Intravenous Agata Godinez RN          heparin (Porcine) 5000 UNIT/ML injection 5,000 Units       Date Action Dose Route User    9/18/2024 1305 Given 5,000 Units Subcutaneous (Right Lower Abdomen) Agata Godinez, RN    9/18/2024 0523 Given 5,000 Units Subcutaneous (Left Lower Abdomen) Bonny Varela RN          HYDROmorphone (Dilaudid) 1 MG/ML injection 0.8 mg       Date Action Dose Route User    9/18/2024 1305 Given 0.8 mg Intravenous Agata Godinez RN    9/18/2024 1042 Given 0.8 mg Intravenous BremondSuellen, DANY    9/18/2024 0844 Given 0.8 mg Intravenous CindymedAgata, RN    9/18/2024 0641 Given 0.8 mg Intravenous DistorBonny RN    9/18/2024 0443 Given 0.8 mg Intravenous Bonny Varela RN    9/18/2024 0257 Given 0.8 mg Intravenous Estrellita Brown RN    9/18/2024 0049 Given 0.8 mg Intravenous Bonny Varela RN    9/17/2024 2213 Given 0.8 mg Intravenous Bonny Varela RN    9/17/2024 2016 Given 0.8 mg Intravenous Bonny Varela RN    9/17/2024 1806 Given 0.8 mg Intravenous Radha Giles RN          indomethacin (Indocin) 50 MG rectal suppository       Date Action Dose Route User    9/17/2024 1622 Given 100 mg Rectal DeferZina RN          iohexol (OMNIPAQUE) 350 MG/ML injection       Date Action Dose Route User    9/17/2024 1628 Given 10 mL Other Raheel Ritchie MD          lactated ringers infusion       Date Action Dose Route User    9/17/2024 1605 New Bag (none) Intravenous Cepaitis, Egle, MD          lidocaine PF (Xylocaine-MPF) 1% injection       Date Action Dose Route User    9/17/2024 1610 Given 5 mL Intravenous Cepaianneliese,  MD Cristel          losartan (Cozaar) tab 100 mg       Date Action Dose Route User    9/18/2024 0845 Given 100 mg Oral Agata Godinez RN          magnesium oxide (Mag-Ox) tab 400 mg       Date Action Dose Route User    9/18/2024 0845 Given 400 mg Oral Agata Godinez RN          metoprolol tartrate (Lopressor) tab 25 mg       Date Action Dose Route User    9/18/2024 0524 Given 25 mg Oral Bonny Varela RN    9/17/2024 1805 Given 25 mg Oral Radha Giles RN          OLANZapine (ZyPREXA) tab 5 mg       Date Action Dose Route User    9/17/2024 2016 Given 5 mg Oral Bonny Varela RN          oxyCODONE immediate release tab 5 mg       Date Action Dose Route User    9/18/2024 1145 Given 5 mg Oral Agata Godinez RN    9/18/2024 0523 Given 5 mg Oral Bonny Varela RN    9/17/2024 1945 Given 5 mg Oral Bonny Varela RN          pantoprazole (Protonix) DR tab 40 mg       Date Action Dose Route User    9/18/2024 0524 Given 40 mg Oral Bonny Varela RN          potassium chloride (Klor-Con M20) tab 40 mEq       Date Action Dose Route User    9/18/2024 0845 Given 40 mEq Oral Agata Godinez RN          pregabalin (Lyrica) cap 25 mg       Date Action Dose Route User    9/17/2024 2015 Given 25 mg Oral Bonny Varela RN          propofol (Diprivan) 10 MG/ML injection       Date Action Dose Route User    9/17/2024 1611 Given 20 mg Intravenous Cristel White MD    9/17/2024 1610 Given 50 mg Intravenous Cristel White MD          propofol (Diprivan) 10 mg/mL infusion premix       Date Action Dose Route User    9/17/2024 1620 Rate/Dose Change 200 mcg/kg/min × 70.3 kg Intravenous Cristel White MD    9/17/2024 1615 Rate/Dose Change 100 mcg/kg/min × 70.3 kg Intravenous Cristel White MD    9/17/2024 1611 New Bag 200 mcg/kg/min × 70.3 kg Intravenous Cristel White MD          sertraline (Zoloft) tab 200 mg       Date Action Dose Route User    9/18/2024 0845 Given 200 mg Oral Agata Godinez, DANY          sodium chloride 0.9%  infusion       Date Action Dose Route User    9/18/2024 0430 New Bag (none) Intravenous Bonny Varela RN    9/17/2024 1945 New Bag (none) Intravenous Bonny Varela RN            Vitals (last day)       Date/Time Temp Pulse Resp BP SpO2 Weight O2 Device O2 Flow Rate (L/min) [facility]    09/18/24 1126 97.8 °F (36.6 °C) 51 18 166/78 99 % -- -- --     09/18/24 0436 97.5 °F (36.4 °C) 61 18 183/81 98 % -- None (Room air) -- JW    09/17/24 2019 97.3 °F (36.3 °C) 58 18 146/81 100 % -- None (Room air) -- CD    09/17/24 1805 97.5 °F (36.4 °C) 59 -- 178/86 100 % -- None (Room air) -- JWA    09/17/24 1708 -- 58 20 -- 98 % -- -- -- DM    09/17/24 1703 -- 58 20 175/85 98 % -- -- -- DM    09/17/24 1653 -- 60 20 176/85 99 % -- -- -- DM    09/17/24 1648 -- 68 16 182/98 94 % -- -- -- DM    09/17/24 1643 -- 69 20 157/93 98 % -- -- -- DM    09/17/24 1638 -- 68 20 146/85 97 % -- -- -- DM    09/17/24 1132 98 °F (36.7 °C) 53 18 149/88 99 % -- -- -- EV    09/17/24 0412 97.7 °F (36.5 °C) 66 15 163/84 96 % -- None (Room air) -- KL          CIWA Scores (since admission)       None

## 2024-09-18 NOTE — PROGRESS NOTES
Inpatient Follow up Note    Dontae Cortez Jr. Patient Status:  Inpatient    10/15/1970 MRN [id number]   Location [facility] 4NW-A Attending Quinton Concepcion,    Hosp Day # 3 PCP Adrian Horowitz MD     Reason for Consultation   Biliary obstruction     Subjective     -Feels better today, had some mild abd pain post-procedure, responded to meds  -Tolerated dinner well  -Passing flatus + BM           Objective:     BP (!) 183/81 (BP Location: Right arm)   Pulse 61   Temp 97.5 °F (36.4 °C) (Oral)   Resp 18   Ht 6' 2\" (1.88 m)   Wt 155 lb (70.3 kg)   SpO2 98%   BMI 19.90 kg/m²   Gen: AAOx2  CV: RRR with normal S1 / S2  Resp: CTA bilaterally  Abd: (+)BS, soft, non-tender, non-distended; no rebound or guarding  Ext: No edema or cyanosis  Skin: Warm and dry     Labs/Imaging     LABRCNTIP[PGLU:5@  No results for input(s): \"INR\" in the last 168 hours.      Recent Labs   Lab 09/15/24  1225 09/16/24  0653 24  0705   WBC 6.4 5.1 5.9 6.5   HGB 11.9* 10.7* 10.9* 10.4*   .0 169.0 180.0 197.0       Recent Labs   Lab 09/15/24  12224  0653 24  0706    137 137 139   K 4.1 4.1 3.8 3.3*    104 103 103   CO2 29.0 30.0 26.0 30.0   BUN 9 9 7* 7*   CREATSERUM 0.64* 0.53* 0.51* 0.45*       Recent Labs   Lab 09/15/24  12224  0653 24  0706   * 358* 293* 225*   * 209* 164* 93*     XR ENDO BILE DUCT (CPT=74328)    Result Date: 2024  CONCLUSION:  Intraoperative fluoroscopic guidance for ERCP as above.   LOCATION:  [place]   Dictated by (CST): Immanuel Matthews MD on 2024 at 4:48 PM     Finalized by (CST): Immanuel Matthews MD on 2024 at 4:48 PM       CTA CHEST + CT ABD (W) + CT PEL (W) (CPT=71275/74048)    Result Date: 9/15/2024  CONCLUSION:  1. Pancreatic masses appear progressed in size compared to the prior examination. 2. Hepatic metastatic disease appears progressed in size compared to the prior  exam.  New intra and extrahepatic biliary ductal dilatation. 3. Status post esophagectomy with gastric pull-through. 4. Subpleural reticular opacities in the lungs may represent an inflammatory or interstitial process.  This may be further evaluated with high-resolution CT of the chest if indicated.   LOCATION:  [facility]   Dictated by (CST): Shar Rousseau MD on 9/15/2024 at 2:53 PM     Finalized by (CST): Shar Rousseau MD on 9/15/2024 at 3:04 PM      AST   Date/Time Value Ref Range Status   09/18/2024 07:06 AM 93 (H) <34 U/L Final   09/23/2013 10:35 AM 13 (L) 15 - 41 U/L Final     ALT   Date/Time Value Ref Range Status   09/18/2024 07:06  (H) 10 - 49 U/L Final   09/23/2013 10:35 AM 29 17 - 63 U/L Final     BUN   Date/Time Value Ref Range Status   09/18/2024 07:06 AM 7 (L) 9 - 23 mg/dL Final   09/23/2013 10:35 AM 10 8 - 20 mg/dL Final     Blood Urea Nitrogen   Date/Time Value Ref Range Status   02/08/2021 11:40 AM 14.0 6.0 - 20.0 mg/dL Final              Assessment      54 y/o male with a h/o metastatic adenocarcinoma of the esophagus presenting with biliary obstruction, RUQ pain, jaundice. Biliary obstruction due to extrahepatic mets  -Status post ERCP with stenting 9/17/24 by Dr. Ritchie  -No apparent post-procedural complications.            Plan     -OK for dc home today  -KUB in 1 week to be coordinated through [facility]ECKY Cardona MD  9:07 AM  9/18/2024  [facility]  [phone]

## 2024-09-18 NOTE — PLAN OF CARE
0830: Pt received alert and oriented x4. Vital signs stable. No SOB on RA. Afebrile. C/o abdominal pain. Pain medication given PRN. All meds given per MAR. Up and using the bathroom. Tolerating diet. Fall precautions in place, call light within reach. GI saw pt and cleared for dc. Waiting on hospitalist to clear for dc.      1200: Pt cleared for discharge. Pt denied discharge and requested to see MD.      1600: Seen by palliative care. Pain medications adjusted.      Problem: GASTROINTESTINAL - ADULT  Goal: Minimal or absence of nausea and vomiting  Description: INTERVENTIONS:  - Maintain adequate hydration with IV or PO as ordered and tolerated  - Nasogastric tube to low intermittent suction as ordered  - Evaluate effectiveness of ordered antiemetic medications  - Provide nonpharmacologic comfort measures as appropriate  - Advance diet as tolerated, if ordered  - Obtain nutritional consult as needed  - Evaluate fluid balance  9/18/2024 1210 by Agata Godinez RN  Outcome: Progressing  9/18/2024 1210 by Agata Godinez RN  Outcome: Progressing  Goal: Maintains or returns to baseline bowel function  Description: INTERVENTIONS:  - Assess bowel function  - Maintain adequate hydration with IV or PO as ordered and tolerated  - Evaluate effectiveness of GI medications  - Encourage mobilization and activity  - Obtain nutritional consult as needed  - Establish a toileting routine/schedule  - Consider collaborating with pharmacy to review patient's medication profile  9/18/2024 1210 by Agata Godinez RN  Outcome: Progressing  9/18/2024 1210 by Agata Godinez RN  Outcome: Progressing

## 2024-09-18 NOTE — PLAN OF CARE
Patient s/p ERCP + Stent placement 9/17/24. Alert and oriented x4. Afebrile. Room air. No SOB. Still complaining of abdominal pain, managed by PRN Pain meds with moderate to good relief. No bleeding, nausea and vomiting so far. Tolerating Soft diet now. Ambulatory. Fall precautions in place. Call light within reach. Needs attended. Will continue Plan of care.     Problem: PAIN - ADULT  Goal: Verbalizes/displays adequate comfort level or patient's stated pain goal  Description: INTERVENTIONS:  - Encourage pt to monitor pain and request assistance  - Assess pain using appropriate pain scale  - Administer analgesics based on type and severity of pain and evaluate response  - Implement non-pharmacological measures as appropriate and evaluate response  - Consider cultural and social influences on pain and pain management  - Manage/alleviate anxiety  - Utilize distraction and/or relaxation techniques  - Monitor for opioid side effects  - Notify MD/LIP if interventions unsuccessful or patient reports new pain  - Anticipate increased pain with activity and pre-medicate as appropriate  Outcome: Progressing     Problem: GASTROINTESTINAL - ADULT  Goal: Minimal or absence of nausea and vomiting  Description: INTERVENTIONS:  - Maintain adequate hydration with IV or PO as ordered and tolerated  - Nasogastric tube to low intermittent suction as ordered  - Evaluate effectiveness of ordered antiemetic medications  - Provide nonpharmacologic comfort measures as appropriate  - Advance diet as tolerated, if ordered  - Obtain nutritional consult as needed  - Evaluate fluid balance  Outcome: Progressing  Goal: Maintains or returns to baseline bowel function  Description: INTERVENTIONS:  - Assess bowel function  - Maintain adequate hydration with IV or PO as ordered and tolerated  - Evaluate effectiveness of GI medications  - Encourage mobilization and activity  - Obtain nutritional consult as needed  - Establish a toileting  routine/schedule  - Consider collaborating with pharmacy to review patient's medication profile  Outcome: Progressing

## 2024-09-19 PROBLEM — G89.3 CANCER ASSOCIATED PAIN: Status: ACTIVE | Noted: 2024-01-01

## 2024-09-19 PROBLEM — G89.3 CANCER ASSOCIATED PAIN: Status: ACTIVE | Noted: 2024-09-19

## 2024-09-19 LAB
AVG NUM CEP17 PROBES/NUCLEUS:: 3
AVG NUM HER-2 SIGNALS/NUCLEUS:: 3.9
HER-2/CEP17 RATIO: 1.29
MAGNESIUM SERPL-MCNC: 1.7 MG/DL (ref 1.6–2.6)
POTASSIUM SERPL-SCNC: 3.9 MMOL/L (ref 3.5–5.1)

## 2024-09-19 PROCEDURE — 99232 SBSQ HOSP IP/OBS MODERATE 35: CPT | Performed by: HOSPITALIST

## 2024-09-19 PROCEDURE — 99232 SBSQ HOSP IP/OBS MODERATE 35: CPT | Performed by: INTERNAL MEDICINE

## 2024-09-19 PROCEDURE — 99233 SBSQ HOSP IP/OBS HIGH 50: CPT | Performed by: CLINICAL NURSE SPECIALIST

## 2024-09-19 RX ORDER — MORPHINE SULFATE 30 MG/1
30 TABLET, FILM COATED, EXTENDED RELEASE ORAL EVERY 12 HOURS SCHEDULED
Qty: 60 TABLET | Refills: 0 | Status: SHIPPED | OUTPATIENT
Start: 2024-09-19 | End: 2024-09-19

## 2024-09-19 RX ORDER — OXYCODONE HYDROCHLORIDE 15 MG/1
30 TABLET, FILM COATED, EXTENDED RELEASE ORAL EVERY 12 HOURS
Status: DISCONTINUED | OUTPATIENT
Start: 2024-09-19 | End: 2024-09-19

## 2024-09-19 RX ORDER — PREGABALIN 25 MG/1
25 CAPSULE ORAL 2 TIMES DAILY
Qty: 60 CAPSULE | Refills: 0 | Status: ON HOLD | OUTPATIENT
Start: 2024-09-19

## 2024-09-19 RX ORDER — OXYCODONE HYDROCHLORIDE 30 MG/1
30 TABLET, FILM COATED, EXTENDED RELEASE ORAL EVERY 12 HOURS
Qty: 28 TABLET | Refills: 0 | Status: SHIPPED | OUTPATIENT
Start: 2024-09-19 | End: 2024-09-19

## 2024-09-19 RX ORDER — MAGNESIUM OXIDE 400 MG/1
400 TABLET ORAL ONCE
Status: COMPLETED | OUTPATIENT
Start: 2024-09-19 | End: 2024-09-19

## 2024-09-19 RX ORDER — MORPHINE SULFATE 30 MG/1
30 TABLET, FILM COATED, EXTENDED RELEASE ORAL EVERY 12 HOURS SCHEDULED
Status: DISCONTINUED | OUTPATIENT
Start: 2024-09-19 | End: 2024-09-20

## 2024-09-19 RX ORDER — HYDROMORPHONE HYDROCHLORIDE 1 MG/ML
0.5 INJECTION, SOLUTION INTRAMUSCULAR; INTRAVENOUS; SUBCUTANEOUS EVERY 4 HOURS PRN
Status: DISCONTINUED | OUTPATIENT
Start: 2024-09-19 | End: 2024-09-20

## 2024-09-19 RX ORDER — OXYCODONE HYDROCHLORIDE 10 MG/1
10 TABLET ORAL EVERY 4 HOURS PRN
Qty: 180 TABLET | Refills: 0 | Status: ON HOLD | OUTPATIENT
Start: 2024-09-19

## 2024-09-19 RX ORDER — MORPHINE SULFATE 30 MG/1
30 TABLET, FILM COATED, EXTENDED RELEASE ORAL EVERY 12 HOURS SCHEDULED
Qty: 60 TABLET | Refills: 0 | Status: SHIPPED | OUTPATIENT
Start: 2024-09-19 | End: 2024-09-20

## 2024-09-19 NOTE — PROGRESS NOTES
[facility]   part of [facility]  Palliative Care Progress Note    Dontae Cortez Jr. Patient Status:  Inpatient    10/15/1970 MRN [id number]   Location [facility] 4NW-A Attending Quinton Concepcion, DO   Hosp Day # 4 PCP Adrian Horowitz MD         Summary     Dontae Cortez Jr. is a 53 year old male with history of metastatic esophageal and pancreatic cancer who was admitted on 9/15/2024 for jaundice progressing a few days PTA. Work up in our hospital revealed elevated LFTs and CT as noted below with progression of disease in the liver and pancreatic mass. He underwent ERCP with biliary sphincterotomy and CBD and PD stent placement .     Consult ordered by:: Dr. Concepcion for evaluation of Palliative Care needs and Uncontrolled symptoms.    Subjective     Interval events:  Still requiring frequent IV dilaudid    When I entered the room, the patient was  sitting up in bed  and awake & alert. No family present at bedside.     Symptom assessment:   Pain assessment:   24 hour (8255-3661) OME total: 217 mg (IV dilaudid 8.6mg + OxyIR 30mg)   .5  Current pain: continues to report 10/10 when meds wear off but 5-6/10 after IV dilaudid, described as sharp, located RUQ abd, Unable to state anything that makes the worse, alleviated by IV dilaudid mostly.  States IV dilaudid not even lasting 2hrs as it was noted he was taking OxyIR within 2hrs after  Pain goal: 5-6/10    See summary of discussion below.     Review of Systems:    Symptoms(s): Pain    Dyspnea: denies  Cough: denies  Nausea: denies  Appetite: good, ordering from [employer] for lunch today  Pain: as above  Constipation: no  Last BM:   Bowel Movement         2024  0840             Stool Count Calculated for I/O: 1            Allergies:  Allergies   Allergen Reactions    Oxaliplatin HIVES     Shaking        Medications:     Current Facility-Administered Medications:     oxyCODONE ER (OxyCONTIN ER) 12 hr tab 30 mg, 30 mg, Oral,  2 times per day    HYDROmorphone (Dilaudid) 1 MG/ML injection 0.5 mg, 0.5 mg, Intravenous, Q4H PRN    losartan (Cozaar) tab 100 mg, 100 mg, Oral, Daily    oxyCODONE immediate release tab 10 mg, 10 mg, Oral, Q4H PRN    pregabalin (Lyrica) cap 25 mg, 25 mg, Oral, BID    cetirizine (ZyrTEC) tab 10 mg, 10 mg, Oral, Daily    heparin (Porcine) 100 Units/mL lock flush 500 Units, 5 mL, Intravenous, PRN    apixaban (Eliquis) tab 5 mg, 5 mg, Oral, BID    dicyclomine (Bentyl) cap 10 mg, 10 mg, Oral, Q6H PRN    sertraline (Zoloft) tab 200 mg, 200 mg, Oral, Daily    pantoprazole (Protonix) DR tab 40 mg, 40 mg, Oral, QAM AC    ondansetron (Zofran-ODT) disintegrating tab 8 mg, 8 mg, Oral, Q8H PRN    OLANZapine (ZyPREXA) tab 5 mg, 5 mg, Oral, Nightly    metoprolol tartrate (Lopressor) tab 25 mg, 25 mg, Oral, 2x Daily(Beta Blocker)    furosemide (Lasix) tab 20 mg, 20 mg, Oral, Daily    acetaminophen (Tylenol Extra Strength) tab 500 mg, 500 mg, Oral, Q4H PRN    melatonin tab 3 mg, 3 mg, Oral, Nightly PRN    glycerin-hypromellose- (Artificial Tears) 0.2-0.2-1 % ophthalmic solution 1 drop, 1 drop, Both Eyes, QID PRN    sodium chloride (Saline Mist) 0.65 % nasal solution 1 spray, 1 spray, Each Nare, Q3H PRN    polyethylene glycol (PEG 3350) (Miralax) 17 g oral packet 17 g, 17 g, Oral, Daily PRN    sennosides (Senokot) tab 17.2 mg, 17.2 mg, Oral, Nightly PRN    bisacodyl (Dulcolax) 10 MG rectal suppository 10 mg, 10 mg, Rectal, Daily PRN    fleet enema (Fleet) rectal enema 133 mL, 1 enema, Rectal, Once PRN    ondansetron (Zofran) 4 MG/2ML injection 4 mg, 4 mg, Intravenous, Q6H PRN    prochlorperazine (Compazine) 10 MG/2ML injection 5 mg, 5 mg, Intravenous, Q8H PRN    Objective     Vital Signs:  Blood pressure 123/66, pulse 75, temperature (!) 96.3 °F (35.7 °C), temperature source Oral, resp. rate 18, height 6' 2\" (1.88 m), weight 155 lb (70.3 kg), SpO2 95%.  Body mass index is 19.9 kg/m².    Physical Exam:  General: Alert & Awake.  In no apparent respiratory distress. Body habitus Thin   HEENT: AT/NC. No gross focal deficits. MMM   Lungs: Normal effort on RA  Abdomen: Soft, tender R, distended  Extremities: No LE edema present  Neurologic: Alert and oriented to person, place, time, and situation   Psychiatric: Mood pleasant mood   Skin: Warm and dry, only slight jaundice noted today, improved    Prior to admission Palliative performance scale PPSv2 (%): 90  Observed during hospitalization: 90 % up walking in halls    % Ambulation Activity Level Self-Care Intake Consciousness   100 Full  Normal  No Disease Full Normal Full   90 Full  Normal  Some Disease Full Normal Full   80 Full  Normal w/effort  Some Disease Full Normal or reduced Full   70 Reduced  Can't Perform Job  Some Disease Full Normal or reduced Full   60 Reduced  Can't Perform Hobby   Significant Disease Occ Assist Normal or reduced Full or confused   50 Mainly sit/lie Can't do any work  Extensive Disease Partial Assist Normal or reduced Full or confused   40 Mainly in bed Can't do any work  Extensive Disease Mainly Assist Normal or reduced Full or confused   30 Bed Bound Can't do any work  Extensive Disease Max Assist  Total Care Reduced  Drowsy/confused   20 Bed Bound Can't do any work  Extensive Disease Max Assist  Total Care Minimal  Drowsy/confused   10 Bed Bound Can't do any work  Extensive Disease Max Assist  Total Care Mouth Care  Drowsy/confused   0 Death        Hematology:  Lab Results   Component Value Date    WBC 6.5 09/18/2024    HGB 10.4 (L) 09/18/2024    HCT 33.1 (L) 09/18/2024    .0 09/18/2024       Coags:  Lab Results   Component Value Date    PT 20.4 (H) 01/30/2014    INR 1.17 09/10/2024    PTT 80.5 (H) 06/19/2024       Chemistry:  Lab Results   Component Value Date    CREATSERUM 0.45 (L) 09/18/2024    BUN 7 (L) 09/18/2024     09/18/2024    K 3.9 09/19/2024     09/18/2024    CO2 30.0 09/18/2024    GLU 89 09/18/2024    CA 8.6 (L) 09/18/2024    ALB  3.0 (L) 09/18/2024    ALKPHO 514 (H) 09/18/2024    BILT 3.5 (H) 09/18/2024    TP 5.4 (L) 09/18/2024    AST 93 (H) 09/18/2024     (H) 09/18/2024    DDIMER 0.38 12/19/2023    BNP 33 08/15/2013    MG 1.7 09/19/2024    PHOS 2.9 03/15/2024    TROP <0.046 07/18/2017       Imaging:  XR ENDO BILE DUCT (CPT=74328)    Result Date: 9/17/2024  CONCLUSION:  Intraoperative fluoroscopic guidance for ERCP as above.   LOCATION:  [facility]   Dictated by (CST): Immanuel Matthews MD on 9/17/2024 at 4:48 PM     Finalized by (CST): Immanuel Matthews MD on 9/17/2024 at 4:48 PM        Summary of Discussion      Focus on symptom management  Discussed/reviewed in detail opioid use over last 24hrs noting continued reliance on IV dilaudid. Patient sharing he would like to DC home today however discussed that PO regimen still not in place to adequately control pain. Patient agreeable to stay until tomorrow morning with changes to pain plan proposed today. States he has an important insurance meeting at 1300 so will need to leave by 1100.   Will implement the following changes today:   START OxyContin 30mg BID  Continue OxyIR 10mg q4hrs prn breakthrough pain  Change IV dilaudid to 0.5mg for severe breakthrough pain not controlled with PO. Reminded patient if at home he would not have available so will need to assess pain without if possible.   Continue increased dose of Lyrica 25mg BID    Advance Care Planning counseling and discussion:  HCPOA:  Document on file Yes [x] No []. Requested for file []  Healthcare Agent Appointed: Yes  Healthcare Agent's Name: Alysha Mcduffie - wife     Patient's wishes noted on form:  QOL/quantity [x] prolongation [].    Code Status:  Full Code    Inpatient Problem List:   Principal Problem:    Transaminitis  Active Problems:    Biliary obstruction (HCC)    Hyperbilirubinemia    Esophageal adenocarcinoma (HCC)        Assessment and Recommendation     Goals of care: established and treatment focused   Plan to f/u  with oncology as OP for modified regimen. Awaiting recent liver bx to determine tx options.   Improved pain control while maintaining function  Advanced Care Planning:  Code Status: Full Code  HCPOA:  Document on file  Healthcare Agent's Name: Alysha Mcduffie - wife  Symptoms  Pain  START OxyContin 30mg BID  Continue OxyIR 10mg q4hrs prn breakthrough pain  Change IV dilaudid to 0.5mg for severe breakthrough pain not controlled with PO. Reminded patient if at home he would not have available so will need to assess pain without if possible.   Continue increased dose of Lyrica 25mg BID    Discussed today's visit with  RN, Care team, and hospitalist.    Palliative Care Follow Up: Palliative care team will Continue to follow while inpatient.  Palliative care follow up at discharge: Established with [facility].    Thank you for allowing Palliative Care services to participate in the care of Dontae Cortez Jr..    A total of 50 minutes were spent on this consult, which included all of the following: chart review, direct face to face contact, history taking, physical examination, counseling and coordinating care, and documentation    ARTEMIO Ray  9/19/2024  10:20 AM  Palliative Care Services    The 21st Century Cures Act makes medical notes like these available to patients in the interest of transparency. Please be advised this is a medical document. Medical documents are intended to carry relevant information, facts as evident, and the clinical opinion of the practitioner. The medical note is intended as peer to peer communication and may appear blunt or direct. It is written in medical language and may contain abbreviations or verbiage that are unfamiliar.

## 2024-09-19 NOTE — PROGRESS NOTES
[facility]   part of [facility]     Hospitalist Progress Note     Dontae Cortez . Patient Status:  Inpatient    10/15/1970 MRN [id number]   [facility] 4NW-A Attending Jan Brito MD   Hosp Day # 4 PCP Adrian Horowitz MD     Chief Complaint: Abdominal pain    Subjective:     Patient seen and examined. Abdominal waxes and wanes. IV dilaudid seems to work best.      Objective:    Review of Systems:   A comprehensive review of systems was completed; pertinent positive and negatives stated in subjective.    Vital signs:  Temp:  [96.3 °F (35.7 °C)-98 °F (36.7 °C)] 97.4 °F (36.3 °C)  Pulse:  [54-75] 75  Resp:  [16-20] 16  BP: (123-141)/(57-73) 123/66  SpO2:  [95 %-100 %] 95 %    Physical Exam:    General: No acute distress, pleasant   Respiratory: No wheezes, no rhonchi  Cardiovascular: S1, S2, regular rate and rhythm  Abdomen: Soft, TTP, non-distended, positive bowel sounds  Neuro: No new focal deficits.   Extremities: No edema    Diagnostic Data:    Labs:  Recent Labs   Lab 09/15/24  1225 24  0653 24  0624 24  0705   WBC 6.4 5.1 5.9 6.5   HGB 11.9* 10.7* 10.9* 10.4*   MCV 92.7 93.4 92.8 93.5   .0 169.0 180.0 197.0       Recent Labs   Lab 24  0653 24  0624 24  0706 24  0541   GLU 96 89 89  --    BUN 9 7* 7*  --    CREATSERUM 0.53* 0.51* 0.45*  --    CA 8.7 8.7 8.6*  --    ALB 3.0* 3.1* 3.0*  --     137 139  --    K 4.1 3.8 3.3* 3.9    103 103  --    CO2 30.0 26.0 30.0  --    ALKPHO 465* 543* 514*  --    * 164* 93*  --    * 293* 225*  --    BILT 6.7* 7.2* 3.5*  --    TP 5.4* 5.6* 5.4*  --        Estimated Creatinine Clearance: 186.6 mL/min (A) (based on SCr of 0.45 mg/dL (L)).    No results for input(s): \"TROP\", \"TROPHS\", \"CK\" in the last 168 hours.    No results for input(s): \"PTP\", \"INR\" in the last 168 hours.                 Microbiology    No results found for this visit on 09/15/24.      Imaging: Reviewed in  Epic.    Medications:    oxyCODONE ER  30 mg Oral 2 times per day    losartan  100 mg Oral Daily    pregabalin  25 mg Oral BID    cetirizine  10 mg Oral Daily    apixaban  5 mg Oral BID    sertraline  200 mg Oral Daily    pantoprazole  40 mg Oral QAM AC    OLANZapine  5 mg Oral Nightly    metoprolol tartrate  25 mg Oral 2x Daily(Beta Blocker)    furosemide  20 mg Oral Daily       Assessment & Plan:      #Elevated liver enzymes secondary to malignant biliary obstruction s/p ERCP with biliary sphincterotomy and CBD/PD stent placement 9/17  GI following  LFTs improving  Pain control, anti-emetics      #Metastatic adenocarcinoma  Had liver biopsy done on 9/10/24 - awaiting final biopsy report      # Metastatic esophageal carcinoma  # Metastatic pancreatic carcinoma  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 Cardona to d/w Dr. Ritchie - this can be done as OP if feasible and patient agreeable        #Previous PE/DVT  Resume DOAC tonight     #Essential HTN - losartan, metoprolol, furosemide   #DLD  #GERD/PUD - PPI  #CAD s/p CABG  #Depression/Anxiety - Zoloft, Olanzapine     Quinton Concepcion,     Supplementary Documentation:     Quality:  DVT Mechanical Prophylaxis:     Early ambuation  DVT Pharmacologic Prophylaxis   Medication    heparin (Porcine) 100 Units/mL lock flush 500 Units    apixaban (Eliquis) tab 5 mg                Code Status: Full Code  Neumann: No urinary catheter in place  Neumann Duration (in days):   Central line:    SHUBHAM: 9/18/2024    Discharge is dependent on: pain improvement   At this point Mr. Cortez is expected to be discharge to:  Home    The 21st Century Cures Act makes medical notes like these available to patients in the interest of transparency. Please be advised this is a medical document. Medical documents are intended to carry relevant information, facts as evident, and the clinical opinion of the practitioner. The medical note is intended as peer to peer communication and may appear blunt or direct. It is written in medical language and may contain abbreviations or verbiage that are unfamiliar.     Dietitian Malnutrition Assessment    Evaluation for Malnutrition: Criteria for severe malnutrition diagnosis- acute illness/injury related to Wt loss greater than 2% in 1 week., Energy intake less than 50% for greater than 5 days.                 RD Malnutrition Care Plan: Liberalized diet., Encouraged increased PO intake., Encouraged small frequent meals with emphasis on high calorie/high protein.    Body Fat/Muscle Mass:          Physician Assessment     Patient has a diagnosis of severe malnutrition

## 2024-09-19 NOTE — PROGRESS NOTES
[facility] Progress Note   Length of Stay: 4    Subjective:   -palliative consulted for pain control  -States that pain is better today    Oncology History   -2018: Per report had a normal EGD and colonoscopy     -June 2022: He met with GI due to new onset dysphagia which started about 1 month prior to his visit.  He had an esophagram with a focal abrupt narrowing with irregular margins in the distal one third of the esophagus extending to the GE junction.  He also had an episode of food impaction. He has also lost about 15 lb. He was a heavy smoker from age 14 to about 40 (1.5 PPD). Also with alcohol use currently. Mother with a history of breast and colon cancer.  EGD and EUS with Dr. Yadav on 6/7/2022: Severe esophagitis with a concerning mass extending 37-39 centimeters from the incisors.  Nonobstructive mass.  By EUS uT3N1 disease.  Pathology showed invasive moderate to poorly differentiated adenocarcinoma.  HER2 amplified by FISH. CT CAP done at [facility] on 6/16/22: Results not loaded to PACs yet.  CA 19-9 from the same day was 107.4.  CEA normal. PET/CT on 6/20/2022: Increased distal esophageal uptake with an SUV of 14.4.  Concern for shreya metastases.     -Concurrent chemoradiation with carboplatin AUC2 plus paclitaxel 50 mg/m2: July 2022-August 2022 with  down (280-->30). Post treatment PET/CT showed improvement.      -Surgery with Dr. Lu on 9/30/22: ypT1b pN0. Recovery has been complicated by an esophageal leak,      -I met with him on 12/12/22. We discussed adjuvant opdivo for 1 year. His  was elevated to 48 and repeat was 64. CT CAP was recommended.      -He was admitted on 12/25/22 for abdominal pain. He had a POEM procedure done. He was also noted to have a RLL consolidation. He was seen by pulmonary, based on imaging an outpatient PET/CT was recommended and a biopsy of the most FDG avid lesion would be considered. Noted to have CAD and an outpatient  evaluation was recommended. Outpatient PET/CT done on 1/4/23 was reviewed in tumor board and there was no focal area of concern and adjuvant immunotherapy recommended.      -Adjuvant opdivo:01/2023-03/2023. PET/CT in in 04/2023 showed uptake in the pancreatic head and tail. Also with some uptake in liver. EGD/EUS on 4/27/23 with Dr. Ritchie: Pancreatic head mass positive for adenocarcinoma: Comparison to previous esophageal cancer shows similarity but IHC in non-specific. Her 2 IHC was 2+ on this specimen but FISH was negative.  Given discordant results on initial HER2 and follow-up HER2 we decided to proceed with chemotherapy with immunotherapy with HER2 directed therapy.     -Chemo + Herceptin + Immunotherapy: He received 7 cycles of FOLFOX + Herceptin + Immunotherapy (05/2023-09/2023). Imaging after C5 showed a favorable response. After C7, we held oxaliplatin due to neuropathy and he was started on maintenance herceptin + IO maintenance. Signatera was negative 09/11/23.     -Herceptin + IO Maintenance: He received 3 cycles from 09/25/23-10/31/23. Treatment was delayed due to hospitalizations.      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 Due to new findings-restarting FOLFOX discussed.      -admitted from 1/8/24-1/13/24 for persistent dysphagia and bronchesophageal stent.      -1/17/24: EGD with fistula closure with ASD occluder and removal of the bronchial stent.     -FOLFOX + Herceptin + Opdivo restarted: 3 cycles: 02/2024-03/2024     -Treatment delayed again for recurrent hospital admissions     -He underwent a Latissimus dorsi flap reconstruction for his bronchoesophageal fistula on  5/1/24     -PET/CT 5/15/24: at least 3 areas of uptake in the right hepatic lobe, uptake in pancrease and retroperitoneum. Post-operative uptake in chest wall.      -FOLFOX + Herceptin + Nivolumab Restarted: 6/11/2024 until 8/14/2024.  He received 5 cycles.  A PET scan after 5 cycles showed overall progression of metastatic disease with new gastrohepatic and peripancreatic lymph nodes along with enlargement of pancreatic and hepatic metastases.  There were also new lung nodules.  A CT-guided liver biopsy on 9/10/2024 was done which was positive for metastatic adenocarcinoma and the immunoprofile is compatible with the patient's esophageal primary.     -He was admitted on 9/15/24 for abdominal pain.  Labs showed elevated AST/ALT and T bili (6.7). CTA CAP showed progression of pancreatic and hepatic masses. There is intra-extra hepatic biliary ductal dilation. Subpleural reticular opacities noted-inflammatory or interstitial process? Hi Res CT recommended. He underwent an ERCP which showed a CBD stricture with biliary sphincterectomy and CBD, PD bile duct stent placement with Dr. Ritchie.     ROS: 12 Point ROS completed and pertinent positives are above     Objective:    losartan  100 mg Oral Daily    pregabalin  25 mg Oral BID    cetirizine  10 mg Oral Daily    apixaban  5 mg Oral BID    sertraline  200 mg Oral Daily    pantoprazole  40 mg Oral QAM AC    OLANZapine  5 mg Oral Nightly    metoprolol tartrate  25 mg Oral 2x Daily(Beta Blocker)    furosemide  20 mg Oral Daily       oxyCODONE    HYDROmorphone **OR** HYDROmorphone **OR** HYDROmorphone    heparin    dicyclomine    ondansetron    acetaminophen    melatonin    glycerin-hypromellose-    sodium chloride    polyethylene glycol (PEG 3350)    sennosides    bisacodyl    fleet enema    ondansetron    prochlorperazine    Physical Exam  Vitals:    09/18/24 2316   BP: 134/57   Pulse: 75   Resp: 20   Temp: 98 °F (36.7 °C)     General: NAD, AOX3,  jaundiced  HEENT: clear op, mmm, no jvd. EOM intact, positive scleral icterus   CV: RRR S1S2 no murmurs  Extremities: No edema  Lungs: CTAB, no increased work of breathing  Abd: soft nt nd +BS no hepatosplenomegaly, surgical scars  Neuro: CN: II-XII grossly intact  Psych: Normal Mood and affect     Labs/Imaging/Path:  Lab Results   Component Value Date    WBC 6.5 09/18/2024    HGB 10.4 (L) 09/18/2024    HCT 33.1 (L) 09/18/2024    MCV 93.5 09/18/2024    .0 09/18/2024       Recent Labs   Lab 09/16/24  0653 09/17/24  0624 09/18/24  0706   GLU 96 89 89   BUN 9 7* 7*   CREATSERUM 0.53* 0.51* 0.45*   CA 8.7 8.7 8.6*   ALB 3.0* 3.1* 3.0*    137 139   K 4.1 3.8 3.3*    103 103   CO2 30.0 26.0 30.0   ALKPHO 465* 543* 514*   * 164* 93*   * 293* 225*   BILT 6.7* 7.2* 3.5*   TP 5.4* 5.6* 5.4*       Imaging: Reviewed     Assessment and Plan:   Jaundice 2/2 elevated bilirubin which is likely related to metastatic disease/CBD stricture. He is s/p ERCP on 9/17/24 with CBD, PD stent placement.      Metastatic esophageal cancer  -Most recent imaging is consistent with metastatic disease progression.  A repeat HER2 on his liver biopsy is pending.  If HER2 positive we will proceed with Enhertu.  If HER2 negative we will consider FOLFIRI plus Cyramza versus paclitaxel and Cyramza     PE/RLE DVT: Dx 6/18/24: on eliquis indefinitely.     Ok to discharge from an oncology standpoint-likely being discharged tomorrow.     COURTNEY Foster MD  [facility]

## 2024-09-19 NOTE — PLAN OF CARE
Problem: PAIN - ADULT  Goal: Verbalizes/displays adequate comfort level or patient's stated pain goal  Description: INTERVENTIONS:  - Encourage pt to monitor pain and request assistance  - Assess pain using appropriate pain scale  - Administer analgesics based on type and severity of pain and evaluate response  - Implement non-pharmacological measures as appropriate and evaluate response  - Consider cultural and social influences on pain and pain management  - Manage/alleviate anxiety  - Utilize distraction and/or relaxation techniques  - Monitor for opioid side effects  - Notify MD/LIP if interventions unsuccessful or patient reports new pain  - Anticipate increased pain with activity and pre-medicate as appropriate  Outcome: Progressing     Problem: GASTROINTESTINAL - ADULT  Goal: Minimal or absence of nausea and vomiting  Description: INTERVENTIONS:  - Maintain adequate hydration with IV or PO as ordered and tolerated  - Nasogastric tube to low intermittent suction as ordered  - Evaluate effectiveness of ordered antiemetic medications  - Provide nonpharmacologic comfort measures as appropriate  - Advance diet as tolerated, if ordered  - Obtain nutritional consult as needed  - Evaluate fluid balance  Outcome: Progressing  Goal: Maintains or returns to baseline bowel function  Description: INTERVENTIONS:  - Assess bowel function  - Maintain adequate hydration with IV or PO as ordered and tolerated  - Evaluate effectiveness of GI medications  - Encourage mobilization and activity  - Obtain nutritional consult as needed  - Establish a toileting routine/schedule  - Consider collaborating with pharmacy to review patient's medication profile  Outcome: Progressing

## 2024-09-19 NOTE — PLAN OF CARE
Received pt A&Ox4 on RA. C/o pain. Pain medication given PRN per MAR. VSS. Call light within reach. Messaged NP, Melissa, about plan for discharge.    1000: Palliative adjusted pain medications. Plan for discharge 9/20 if pain controlled appropriately. Pain meds given PRN per MAR.    1850: Pt co of heartburn after eating. Dicyclomine given per MAR.    Problem: PAIN - ADULT  Goal: Verbalizes/displays adequate comfort level or patient's stated pain goal  Description: INTERVENTIONS:  - Encourage pt to monitor pain and request assistance  - Assess pain using appropriate pain scale  - Administer analgesics based on type and severity of pain and evaluate response  - Implement non-pharmacological measures as appropriate and evaluate response  - Consider cultural and social influences on pain and pain management  - Manage/alleviate anxiety  - Utilize distraction and/or relaxation techniques  - Monitor for opioid side effects  - Notify MD/LIP if interventions unsuccessful or patient reports new pain  - Anticipate increased pain with activity and pre-medicate as appropriate  9/19/2024 0815 by Agata Godinez RN  Outcome: Progressing  9/19/2024 0815 by Agata Godinez RN  Outcome: Progressing     Problem: GASTROINTESTINAL - ADULT  Goal: Minimal or absence of nausea and vomiting  Description: INTERVENTIONS:  - Maintain adequate hydration with IV or PO as ordered and tolerated  - Nasogastric tube to low intermittent suction as ordered  - Evaluate effectiveness of ordered antiemetic medications  - Provide nonpharmacologic comfort measures as appropriate  - Advance diet as tolerated, if ordered  - Obtain nutritional consult as needed  - Evaluate fluid balance  9/19/2024 0815 by Agata Godinez RN  Outcome: Progressing  9/19/2024 0815 by Agata Godinez RN  Outcome: Progressing  Goal: Maintains or returns to baseline bowel function  Description: INTERVENTIONS:  - Assess bowel function  - Maintain adequate hydration with IV or PO as ordered and  tolerated  - Evaluate effectiveness of GI medications  - Encourage mobilization and activity  - Obtain nutritional consult as needed  - Establish a toileting routine/schedule  - Consider collaborating with pharmacy to review patient's medication profile  9/19/2024 0815 by Agata Godinez, RN  Outcome: Progressing  9/19/2024 0815 by Agata Godinez, RN  Outcome: Progressing

## 2024-09-19 NOTE — PROGRESS NOTES
Pt A&Ox4 on room air, complaints of pain, prn pain medication given Q 2hr. VSS. Call light within reach, frequent checks made, needs met.

## 2024-09-19 NOTE — PROGRESS NOTES
Palliative update  Attempted to send Rx for OxyContin to patient's pharmacy and cost was $400 which he could not afford. Morphine ER was affordable ($5) and available. Patient informed and states he has taken and tolerated Morphine ER in the past noting that his hallucinations and falling were when he took gabapentin and was not on morphine at the time. He was agreeable to change inpatient order to Morphine 30mg BID starting tonight and will also get dose in AM prior to anticipated discharge at or before 1100.     Follow up assessment at 1430 after receiving first dose of OxyContin 30mg this AM and OxyIR 10mg about 2hrs later have provided adequate pain control rating pain 4/10, he has been up in the valenzuela walking without SE.     1600: Patient informed of above plan and is agreeable. Rx's for Morphine ER 30mg, OxyIR 10mg and Pregabalin 25mg BID send to pharmacy on file and it is confirmed they will available tomorrow.     ARTEMIO Ray  Palliative Care   Phone: [phone]     9/19/2024  4:33 PM

## 2024-09-20 VITALS
WEIGHT: 155 LBS | BODY MASS INDEX: 19.89 KG/M2 | TEMPERATURE: 98 F | HEIGHT: 74 IN | RESPIRATION RATE: 16 BRPM | HEART RATE: 74 BPM | DIASTOLIC BLOOD PRESSURE: 92 MMHG | OXYGEN SATURATION: 99 % | SYSTOLIC BLOOD PRESSURE: 145 MMHG

## 2024-09-20 LAB — MAGNESIUM SERPL-MCNC: 1.8 MG/DL (ref 1.6–2.6)

## 2024-09-20 PROCEDURE — 99239 HOSP IP/OBS DSCHRG MGMT >30: CPT | Performed by: HOSPITALIST

## 2024-09-20 PROCEDURE — 99231 SBSQ HOSP IP/OBS SF/LOW 25: CPT | Performed by: CLINICAL NURSE SPECIALIST

## 2024-09-20 PROCEDURE — 99233 SBSQ HOSP IP/OBS HIGH 50: CPT | Performed by: INTERNAL MEDICINE

## 2024-09-20 RX ORDER — MORPHINE SULFATE 30 MG/1
30 TABLET, FILM COATED, EXTENDED RELEASE ORAL EVERY 12 HOURS SCHEDULED
Qty: 60 TABLET | Refills: 0 | Status: SHIPPED | OUTPATIENT
Start: 2024-09-20 | End: 2024-10-20

## 2024-09-20 NOTE — TELEPHONE ENCOUNTER
MS contin 30mg bid re ordered. I spoke with [facility] & I spoke with Ronen & he understands the RX will be ready today

## 2024-09-20 NOTE — DISCHARGE SUMMARY
[facility] HOSPITALIST  DISCHARGE SUMMARY     Dontae Cortez Jr. Patient Status:  Inpatient    10/15/1970 MRN [id number]   Location [facility] 4NW-A Attending No att. providers found   Hosp Day # 5 PCP Adrian Horowitz MD     Date of Admission: 9/15/2024  Date of Discharge:  2024     Discharge Disposition: Home or Self Care    Discharge Diagnosis:  Elevated liver enzymes secondary to malignant biliary obstruction s/p ERCP with biliary sphincterotomy and CBD/PD stent placement   Metastatic esophageal carcinoma  Metastatic pancreatic carcinoma   Cancer related pain  Previous PE/DVT  Essential HTN  Dyslipidemia  GERD/PUD  CAD with prior CABG  Depression/anxiety     History of Present Illness: Dontae Cortez Jr. is a 53 year old male with a PMH Of metastatic esophageal and pancreatic cancer who presents with abdominal pain and yellowing of skin.  They have noticed symptoms worsening over the past couple of days.  Denies any n/v, no fevers, no diarrhea.  No other acute complaints.      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 Cardona to d/w Dr. Ritchie - plan for OP GI follow-up to discuss further      #Previous PE/DVT  DOAC    #Essential HTN - losartan, metoprolol, furosemide   #DLD  #GERD/PUD - PPI  #CAD s/p  CABG  #Depression/Anxiety - Zoloft, Olanzapine     #Disposition  -Stable for DC home     Lace+ Score: 82  59-90 High Risk  29-58 Medium Risk  0-28   Low Risk       TCM Follow-Up Recommendation:  LACE > 58: High Risk of readmission after discharge from the hospital.      Procedures during hospitalization:   ERCP with biliary sphincterotomy and CBD, PD stent placement     Incidental or significant findings and recommendations (brief descriptions):  None    Lab/Test results pending at Discharge:   None    Consultants:  GI  Oncology  Palliative care    Discharge Medication List:     Discharge Medications        CHANGE how you take these medications        Instructions Prescription details   losartan 100 MG Tabs  Commonly known as: Cozaar  What changed:   medication strength  how much to take      Take 1 tablet (100 mg total) by mouth daily.   Stop taking on: October 18, 2024  Quantity: 30 tablet  Refills: 0     oxyCODONE 10 MG Tabs  What changed:   medication strength  how much to take  reasons to take this      Take 1 tablet (10 mg total) by mouth every 4 (four) hours as needed (for cancer related pain).   Quantity: 180 tablet  Refills: 0     pregabalin 25 MG Caps  Commonly known as: Lyrica  What changed: when to take this      Take 1 capsule (25 mg total) by mouth 2 (two) times daily.   Quantity: 60 capsule  Refills: 0            CONTINUE taking these medications        Instructions Prescription details   apixaban 5 MG Tabs  Commonly known as: Eliquis      Take 1 tablet (5 mg total) by mouth 2 (two) times daily.   Quantity: 60 tablet  Refills: 2     dicyclomine 10 MG Caps  Commonly known as: Bentyl      Take 1 capsule (10 mg total) by mouth 3 (three) times daily as needed.   Quantity: 90 capsule  Refills: 1     furosemide 20 MG Tabs  Commonly known as: Lasix      Take 1 tablet (20 mg total) by mouth daily.   Quantity: 30 tablet  Refills: 1     metoprolol tartrate 25 MG Tabs  Commonly known as: Lopressor      Take 1  tablet (25 mg total) by mouth 2x Daily(Beta Blocker).   Quantity: 60 tablet  Refills: 1     OLANZapine 5 MG Tabs  Commonly known as: ZyPREXA      Take 1 tablet (5 mg total) by mouth nightly.   Refills: 0     ondansetron 8 MG Tbdp  Commonly known as: Zofran-ODT      Take 1 tablet (8 mg total) by mouth every 8 (eight) hours as needed for Nausea.   Quantity: 30 tablet  Refills: 0     pantoprazole 40 MG Tbec  Commonly known as: Protonix      Take 1 tablet (40 mg total) by mouth before breakfast.   Quantity: 30 tablet  Refills: 0     Potassium Chloride ER 20 MEQ Tbcr      Take 1 tablet by mouth daily.   Quantity: 30 tablet  Refills: 1     prochlorperazine 10 mg tablet  Commonly known as: Compazine      Take 1 tablet (10 mg total) by mouth every 6 (six) hours as needed for Nausea.   Refills: 0     sertraline 100 MG Tabs  Commonly known as: Zoloft      Take 2 tablets (200 mg total) by mouth daily.   Refills: 0            STOP taking these medications      benzonatate 100 MG Caps  Commonly known as: Tessalon        Omeprazole 40 MG Cpdr                  Where to Get Your Medications        [id number] [place], [place] - [address] [facility] [phone], [phone]  [address] [place] [address]      Phone: [phone]   losartan 100 MG Tabs  oxyCODONE 10 MG Tabs  pregabalin 25 MG Caps         [place]    Follow-up appointment:   Adrian Horowitz MD  [address]  [facility] [address] [phone]    Follow up in 1 week(s)  Follow up    Raheel Ritchie MD  [address] [place] [address] [phone]    Go in 1 month(s)  for a follow-up office visit with GI. The office will call you to arrange date/time of visit to discuss possible endoscopy for your chronic abd pain    Lexy Stewart, ARTEMIO  [address] [place] [address] in [facility] [address] [phone]    Go on 9/24/2024  Video visit scheduled for 1:00pm. Check your My  Chart account for details.    Appointments for Next 30 Days 9/20/2024 - 10/20/2024        Date Arrival Time Visit Type Length Department Provider     9/23/2024  9:00 AM  CHEMOTHERAPY [2076] 60 min [facility] in [place] PF [place] RN1    Patient Instructions:         Location Instructions:     Your appointment is scheduled at the [facility] in [place]. The address is [address] [place] [address]. Please park in the GREEN LOT and enter through the CANCER CENTER ENTRANCE of BUILDING A.. Once you arrive, please register at the [facility]  on the 1st floor.  Masks are optional for all patients and visitors, unless otherwise indicated. No care partners/visitors under 18 years of age are allowed in the infusion room.               9/23/2024  9:30 AM  ON TREATMENT VISIT FOLLOW-UP [2641] 15 min [facility] in [place] Senia Foster MD    Patient Instructions:         Location Instructions:     **IF YOU NEED LABWORK OR AN INFUSION ALONG WITH YOUR APPOINTMENT, YOU MUST CALL TO SCHEDULE.**  Your appointment is scheduled at the [facility] in [place]. The address is [address] [place] [address]. Please park in the GREEN LOT and enter through the CANCER CENTER ENTRANCE of BUILDING A. Once you arrive, please register at the [facility]  on the 1st floor.  Masks are optional for all patients and visitors, unless otherwise indicated.               9/24/2024  1:00 PM  [facility] OP VIDEO VISIT [7093] 30 min [facility] in [place] Lexy Stewart APRN    Patient Instructions:     Please verify your telehealth insurance benefits prior to your appointment.    You must be in the state of [place] during the virtual visit.     Please use the [employer] and launch the video visit 10 minutes prior to your scheduled appointment time to ensure your camera and microphone are working properly. Once the video visit has started you will be  placed in a waiting room until the provider begins the visit.     You will receive an email confirmation with instructions.  If you have questions, call your doctor's office directly.    If you are having issues or need to use a desktop/laptop, please follow the below steps:        1.       Close out all other open apps (could be competing for audio resources)  2.       Disable Bluetooth  3.       Reboot mobile device before joining the video  4.       Come off Wi-Fi and switch over to Data    Please see our Video Visit Tip Sheet if you need additional assistance.     If you believe this is an emergency, please dial 911 immediately.          Location Instructions:     **IF YOU NEED LABWORK OR AN INFUSION ALONG WITH YOUR APPOINTMENT, YOU MUST CALL TO SCHEDULE.**  Your appointment is scheduled at the [facility] in [place]. The address is [address] [place] [address]. Please park in the GREEN LOT and enter through the [facility] CENTER ENTRANCE of BUILDING A. Once you arrive, please register at the [facility]  on the 1st floor.  Masks are optional for all patients and visitors, unless otherwise indicated.                      Vital signs:  Temp:  [97.7 °F (36.5 °C)-98.5 °F (36.9 °C)] 97.7 °F (36.5 °C)  Pulse:  [56-74] 74  Resp:  [16-18] 16  BP: (134-145)/(76-92) 145/92  SpO2:  [96 %-99 %] 99 %    Physical Exam:    General: No acute distress   Lungs: clear to auscultation  Cardiovascular: S1, S2  Abdomen: Soft    -----------------------------------------------------------------------------------------------  PATIENT DISCHARGE INSTRUCTIONS: See electronic chart    Quinton Concepcion DO    Total time spent on discharge plannin minutes     The  [employer] C[employer] Act makes medical notes like these available to patients in the interest of transparency. Please be advised this is a medical document. Medical documents are intended to carry relevant information, facts as evident, and the clinical  opinion of the practitioner. The medical note is intended as peer to peer communication and may appear blunt or direct. It is written in medical language and may contain abbreviations or verbiage that are unfamiliar.

## 2024-09-20 NOTE — PROGRESS NOTES
Pt A&Ox4 on room air, complaints of pain, prn and scheduled pain medication given as needed. VSS. Call light within reach, frequent checks made, needs met.     2137 pt complaining of breakthrough pain, explained to patient that if I give him the IV pain medication that theres a chance he will not go home tomorrow due to still needing IV pain medications. Pt began to get upset that I withholding pain medication, I explained again that I am not I am just explaining that he will not have these medications at home and they need to assess him without any IV pain medication and if he still needs it I can give it to him but that means his pain is still not managed to go home. Pt still wanted IV pain medication, administered med.

## 2024-09-20 NOTE — PLAN OF CARE
Pain is tolerable with present pain medication regimen, Took a dose of IV Dilaudid last night.  Patient states he is good for discharge.  Palliative care RN was here, cleared patient for discharge, to continue with long acting Oxy and breakthrough pain medication at home.  Cleared by hospitalist.   1030  Discharged via ambulatory in good condition, voiced understanding with discharge instructions.      Problem: PAIN - ADULT  Goal: Verbalizes/displays adequate comfort level or patient's stated pain goal  Description: INTERVENTIONS:  - Encourage pt to monitor pain and request assistance  - Assess pain using appropriate pain scale  - Administer analgesics based on type and severity of pain and evaluate response  - Implement non-pharmacological measures as appropriate and evaluate response  - Consider cultural and social influences on pain and pain management  - Manage/alleviate anxiety  - Utilize distraction and/or relaxation techniques  - Monitor for opioid side effects     Notify MD/LIP if interventions unsuccessful or patient reports new pain  - Anticipate increased pain with activity and pre-medicate as appropriate  Outcome: Adequate for Discharge    Problem: GASTROINTESTINAL - ADULT  Goal: Minimal or absence of nausea and vomiting  Description: INTERVENTIONS:  - Maintain adequate hydration with IV or PO as ordered and tolerated  - Nasogastric tube to low intermittent suction as ordered  - Evaluate effectiveness of ordered antiemetic medications  - Provide nonpharmacologic comfort measures as appropriate  - Advance diet as tolerated, if ordered  - Obtain nutritional consult as needed  - Evaluate fluid balance  Outcome: Adequate for Discharge  Goal: Maintains or returns to baseline bowel function  Description: INTERVENTIONS:  - Assess bowel function  - Maintain adequate hydration with IV or PO as ordered and tolerated  - Evaluate effectiveness of GI medications  - Encourage mobilization and activity  - Obtain  nutritional consult as needed  - Establish a toileting routine/schedule  - Consider collaborating with pharmacy to review patient's medication profile  Outcome: Adequate for Discharge

## 2024-09-20 NOTE — PROGRESS NOTES
[facility] Note    Diagnosis:   Metastatic Esophageal Cancer. Possible 2nd pancreatic primary?  -Initially cT3 N1 Esophageal Adenocarcinoma (stage IIIB)  -HER2 amplified by FISH    Treatment History:   1. Neoadjuvant chemoRT with carbo-taxol: 7/6/22-8/10/22    2. laparoscopic robotic-assisted esophagogastrectomy with feeding jejunostomy tube placement on 9/30/2022.     3. Adjuvant Opdivo: 1/9/23-2/20/23    ---Metastatic disease developed-----    5. Chemo-Herceptin-Immunotherapy: 5/1/23-09/2023 --> Switched to Herceptin/Immunotherapy maintenance on 9/25/23 due to negative signatera.     6. Chemotherapy restarted on 2/5/24 due to progression     Visit Diagnosis:  1. Malignant neoplasm of lower third of esophagus (HCC)      History of Present Illness: 53M with a PMH of CAD status post CABG, HLD and HTN is here for follow up for metastatic esophageal adenocarcinoma, HER2+. He has either a 2nd pancreatic primary vs. Metastatic esophageal to the pancreas.    Oncology History   -2018: Per report had a normal EGD and colonoscopy    -June 2022: He met with GI due to new onset dysphagia which started about 1 month prior to his visit.  He had an esophagram with a focal abrupt narrowing with irregular margins in the distal one third of the esophagus extending to the GE junction.  He also had an episode of food impaction. He has also lost about 15 lb. He was a heavy smoker from age 14 to about 40 (1.5 PPD). Also with alcohol use currently. Mother with a history of breast and colon cancer.  EGD and EUS with Dr. Yadav on 6/7/2022: Severe esophagitis with a concerning mass extending 37-39 centimeters from the incisors.  Nonobstructive mass.  By EUS uT3N1 disease.  Pathology showed invasive moderate to poorly differentiated adenocarcinoma.  HER2 amplified by FISH. CT CAP done at [facility] on 6/16/22: Results not loaded to PACs yet.  CA 19-9 from the same day was 107.4.  CEA normal. PET/CT on  6/20/2022: Increased distal esophageal uptake with an SUV of 14.4.  Concern for shreya metastases.    -Concurrent chemoradiation with carboplatin AUC2 plus paclitaxel 50 mg/m2: July 2022-August 2022 with  down (280-->30). Post treatment PET/CT showed improvement.     -Surgery with Dr. Lu on 9/30/22: ypT1b pN0. Recovery has been complicated by an esophageal leak,     -I met with him on 12/12/22. We discussed adjuvant opdivo for 1 year. His  was elevated to 48 and repeat was 64. CT CAP was recommended.     -He was admitted on 12/25/22 for abdominal pain. He had a POEM procedure done. He was also noted to have a RLL consolidation. He was seen by pulmonary, based on imaging an outpatient PET/CT was recommended and a biopsy of the most FDG avid lesion would be considered. Noted to have CAD and an outpatient evaluation was recommended. Outpatient PET/CT done on 1/4/23 was reviewed in tumor board and there was no focal area of concern and adjuvant immunotherapy recommended.     -Adjuvant opdivo:01/2023-03/2023. PET/CT in in 04/2023 showed uptake in the pancreatic head and tail. Also with some uptake in liver. EGD/EUS on 4/27/23 with Dr. Ritchie: Pancreatic head mass positive for adenocarcinoma: Comparison to previous esophageal cancer shows similarity but IHC in non-specific. Her 2 IHC was 2+ on this specimen but FISH was negative.  Given discordant results on initial HER2 and follow-up HER2 we decided to proceed with chemotherapy with immunotherapy with HER2 directed therapy.    -Chemo + Herceptin + Immunotherapy: He received 7 cycles of FOLFOX + Herceptin + Immunotherapy (05/2023-09/2023). Imaging after C5 showed a favorable response. After C7, we held oxaliplatin due to neuropathy and he was started on maintenance herceptin + IO maintenance. Signatera was negative 09/11/23.    -Herceptin + IO Maintenance: He received 3 cycles from 09/25/23-10/31/23. Treatment was delayed due to hospitalizations.      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 Due to new findings-restarting FOLFOX discussed.     -admitted from 1/8/24-1/13/24 for persistent dysphagia and bronchesophageal stent.     -1/17/24: EGD with fistula closure with ASD occluder and removal of the bronchial stent.    -FOLFOX + Herceptin + Opdivo restarted: 3 cycles: 02/2024-03/2024    -Treatment delayed again for recurrent hospital admissions    -He underwent a Latissimus dorsi flap reconstruction for his bronchoesophageal fistula on 5/1/24    -PET/CT 5/15/24: at least 3 areas of uptake in the right hepatic lobe, uptake in pancrease and retroperitoneum. Post-operative uptake in chest wall.     -FOLFOX + Herceptin + Nivolumab Restarted: 6/11/2024 until 8/14/2024.  He received 5 cycles.  A PET scan after 5 cycles showed overall progression of metastatic disease with new gastrohepatic and peripancreatic lymph nodes along with enlargement of pancreatic and hepatic metastases.  There were also new lung nodules.  A CT-guided liver biopsy on 9/10/2024 was done which was positive for metastatic adenocarcinoma and the immunoprofile is compatible with the patient's esophageal primary. HER2 negative on FISH.    -He was admitted on 9/15/24 for abdominal pain. Labs showed elevated AST/ALT and T bili (6.7). CTA CAP showed progression of pancreatic and hepatic masses. There is intra-extra hepatic biliary ductal dilation. Subpleural reticular opacities noted-inflammatory or interstitial process? Hi Res CT recommended. He underwent an ERCP which showed a CBD stricture with biliary sphincterectomy and CBD, PD bile duct stent placement with Dr. Ritchie.      Interval History:   -C1 taxol today.   -He feels better overall with pain medication    [employer]view of Systems: 12 Point ROS was completed and pertinent positives are in the HPI    Current Outpatient Medications on File Prior to Visit   Medication Sig Dispense Refill    morphINE ER 30 MG Oral Tab CR Take 1 tablet (30 mg total) by mouth every 12 (twelve) hours. 60 tablet 0    oxyCODONE 10 MG Oral Tab Take 1 tablet (10 mg total) by mouth every 4 (four) hours as needed (for cancer related pain). 180 tablet 0    pregabalin 25 MG Oral Cap Take 1 capsule (25 mg total) by mouth 2 (two) times daily. 60 capsule 0    sertraline 100 MG Oral Tab Take 2 tablets (200 mg total) by mouth daily.      losartan 100 MG Oral Tab Take 1 tablet (100 mg total) by mouth daily. 30 tablet 0    OLANZapine 5 MG Oral Tab Take 1 tablet (5 mg total) by mouth nightly.      prochlorperazine (COMPAZINE) 10 mg tablet Take 1 tablet (10 mg total) by mouth every 6 (six) hours as needed for Nausea.      pantoprazole 40 MG Oral Tab EC Take 1 tablet (40 mg total) by mouth before breakfast. 30 tablet 0    metoprolol tartrate 25 MG Oral Tab Take 1 tablet (25 mg total) by mouth 2x Daily(Beta Blocker). 60 tablet 1    ondansetron 8 MG Oral Tablet Dispersible Take 1 tablet (8 mg total) by mouth every 8 (eight) hours as needed for Nausea. 30 tablet 0    dicyclomine 10 MG Oral Cap Take 1 capsule (10 mg total) by mouth 3 (three) times daily as needed. 90 capsule 1    furosemide 20 MG Oral Tab Take 1 tablet (20 mg total) by mouth daily. 30 tablet 1    Potassium Chloride ER 20 MEQ Oral Tab CR Take 1 tablet by mouth daily. 30 tablet 1    apixaban 5 MG Oral Tab Take 1 tablet (5 mg total) by mouth 2 (two) times daily. 60 tablet 2     Current Facility-Administered Medications on File Prior to Visit   Medication Dose Route Frequency Provider Last Rate Last Admin    [COMPLETED] magnesium oxide (Mag-Ox) tab 400 mg  400 mg Oral Once Quinton Concepcion DO   400 mg at 09/19/24 08     [COMPLETED] magnesium oxide (Mag-Ox) tab 400 mg  400 mg Oral Once Ghelani, Quinton, DO   400 mg at 24 0845    [COMPLETED] potassium chloride (Klor-Con M20) tab 40 mEq  40 mEq Oral Once Ghelani, Quinton, DO   40 mEq at 24 0845    [COMPLETED] magnesium oxide (Mag-Ox) tab 400 mg  400 mg Oral Once Jan Brito MD   400 mg at 24 0854    [] indomethacin (Indocin) 100 MG rectal suppository             [COMPLETED] magnesium oxide (Mag-Ox) tab 400 mg  400 mg Oral Once Jan Brito MD   400 mg at 24 0815    [COMPLETED] HYDROmorphone (Dilaudid) 1 MG/ML injection 0.5 mg  0.5 mg Intravenous Q30 Min PRN Vishal Harrison MD   0.5 mg at 09/15/24 1536    [COMPLETED] sodium chloride 0.9 % IV bolus 500 mL  500 mL Intravenous Once Vishal Harrison MD   Stopped at 09/15/24 1410    [COMPLETED] iopamidol 76% (ISOVUE-370) injection for power injector  100 mL Intravenous ONCE PRN Vishal Harrison MD   100 mL at 09/15/24 1450    [COMPLETED] piperacillin-tazobactam (Zosyn) 4.5 g in dextrose 5% 100 mL IVPB-ADDV  4.5 g Intravenous Once Vishal Harrison MD   Stopped at 09/15/24 1648    [] midazolam (Versed) 2 MG/2ML injection 1 mg  1 mg Intravenous Q5 Min PRN Jenny Navarrete MD   1 mg at 09/10/24 1104    [] fentaNYL (Sublimaze) 50 mcg/mL injection 50 mcg  50 mcg Intravenous Q5 Min PRN Jenny Navarrete MD   50 mcg at 24 1605    [COMPLETED] heparin (Porcine) 100 Units/mL lock flush 500 Units  5 mL Intracatheter Once Cuong Wan MD   500 Units at 09/10/24 1415    [COMPLETED] heparin (Porcine) 100 Units/mL lock flush 500 Units  5 mL Intracatheter Once Senia Foster MD   500 Units at 24 0845     Past Medical History:    Back problem    Belching    Black stools    Borderline diabetes    Dx in 2013 - HgA1C 6.2%    C. difficile diarrhea    pt treated and without symptoms    Chest pain    Coronary artery disease    On 13: CABG x 4 with LIMA to LAD and SVG to diagonal, OM and PDA     Decorative tattoo    Depression    Difficult intubation    h/o esophagectomy for CA; developed esophagobronchial vistula    Disorder of liver    LIVER CA    Esophageal cancer (HCC)    completed chemo    Esophageal reflux    Essential hypertension    Exposure to medical diagnostic radiation    last tx 8/18/2022    Frequent urination    Gastroparesis    Heartburn    High blood pressure    High cholesterol    Found when I had quadruple bypass    History of COVID-19    asymptomatic - pt was dx during a hospitalization for another diagnosis. No continued symptoms    History of stomach ulcers    Hyperlipidemia    Hyperlipidemia LDL goal < 70    Indigestion    Morbid obesity with BMI of 40.0-44.9, adult (HCC)    Muscle weakness    Nausea vomiting and diarrhea    Nontoxic multinodular goiter    Dx in 8/2013: pt was told that imaging showed thyroid cysts per PCP    Pancreatic cancer (HCC)    last dose 12/4/2023 is scheduled for another round 12/27/23    Peripheral vascular disease (HCC)    pt denies    Personal history of antineoplastic chemotherapy    for esophageal cancer/completed    Personal history of antineoplastic chemotherapy    pancreatic cancer    Personal history of antineoplastic chemotherapy    Last treatment 3/12/24    Problems with swallowing    Pulmonary nodules    Dx in 8/2013: CT chest showed small bilateral fissural-based lung nodules less than 1 cm    S/P CABG x 4    On 8/16/13: CABG x 4 with LIMA to LAD and SVG to diagonal, OM and PDA    Shortness of breath    when coughing; no oxygen    Vomiting     Past Surgical History:   Procedure Laterality Date    Appendectomy      Appendectomy      Arthroscopy of joint unlisted      right shoulder    Cabg      On 8/16/13: CABG x 4 with LIMA to LAD and SVG to diagonal, OM and PDA    Cardiac cath lab      On 8/14/2013: cardiac cath showed 3-vessel disease    Other surgical history      1.       Laparoscopic robotic-assisted esophagogastrectomy.    Port, indwelling,  imp       Social History     Socioeconomic History    Marital status:     Number of children: 3   Occupational History    Occupation: works as  - on workman's comp   Tobacco Use    Smoking status: Former     Current packs/day: 0.00     Average packs/day: 1 pack/day for 27.0 years (27.0 ttl pk-yrs)     Types: Cigarettes     Start date: 8/15/1986     Quit date: 8/15/2013     Years since quittin.1    Smokeless tobacco: Never    Tobacco comments:     Quit smoking    Vaping Use    Vaping status: Never Used   Substance and Sexual Activity    Alcohol use: Never    Drug use: Never    Sexual activity: Not Currently     Partners: Female      Family History   Problem Relation Age of Onset    Cancer Mother         breast and colon     Diabetes Neg        Physical Exam  Height: 185.7 cm (6' 1.11\") (905)  Weight: 73.5 kg (162 lb) (905)  BSA (Calculated - sq m): 1.97 sq meters (905)  Pulse: 60 (905)  BP: 110/69 (905)  Temp: 96.9 °F (36.1 °C) (905)  Do Not Use - Resp Rate: --  SpO2: 98 % (905)     General: NAD, AOX3  HEENT: clear op, mmm, no jvd, no scleral icterus  CV: RRR  Extremities: No edema   Lungs:  no increased work of breathing  Abd: soft nt nd +BS no hepatosplenomegaly  Neuro: CN: II-XII grossly intact    Results:  Lab Results   Component Value Date    WBC 8.7 2024    HGB 10.2 (L) 2024    HCT 32.7 (L) 2024    MCV 97.3 2024    .0 2024    EOSABS 0.38 2024     Lab Results   Component Value Date     2024    K 3.9 2024    CO2 30.0 2024     2024    BUN 7 (L) 2024    PHOS 2.9 03/15/2024    ALB 3.0 (L) 2024       Lab Results   Component Value Date     2023       Radiology: reviewed     Pathology: reviewed     Assessment and Plan:  53M with a PMH of CAD status post CABG, HLD and HTN was referred by Dr. Yadav for esophageal cancer.    Metastatic  esophageal cancer  -Most recent imaging is consistent with metastatic disease progression.  Repeat HER2 is negative on FISH. We discussed that he will not be a good candidate for Cyramza due to history of esophageal fistula and PE/DVT. We discussed palliative FOLFIRI and paclitaxel. WE will plan on weekly paclitaxel (3 weeks on 1 week off) with repeat imaging after C2.   -Meet with palliative care     PE/RLE DVT: Dx 6/18/24: on eliquis indefinitely.     Jaundice 2/2 elevated bilirubin which is likely related to metastatic disease/CBD stricture. He is s/p ERCP on 9/17/24 with CBD, PD stent placement.     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 Now s/p repair 5/1/24.     Neuropathy: stable     Cancer related pain: follow up with palliative care     [facility]

## 2024-09-20 NOTE — PROGRESS NOTES
[facility]   part of [facility]  Palliative Care Progress Note    Dontae Cortez Jr. Patient Status:  Inpatient    10/15/1970 MRN [id number]   Location [facility] 4NW-A Attending Quinton Concepcion,    Hosp Day # 5 PCP Adrian Horowitz MD         Summary     Dontae Cortez Jr. is a 53 year old male with history of metastatic esophageal and pancreatic cancer who was admitted on 9/15/2024 for jaundice progressing a few days PTA. Work up in our hospital revealed elevated LFTs and CT as noted below with progression of disease in the liver and pancreatic mass. He underwent ERCP with biliary sphincterotomy and CBD and PD stent placement .     Consult ordered by:: Dr. Concepcion for evaluation of Palliative Care needs and Uncontrolled symptoms.    Subjective     Interval events: none    When I entered the room, the patient was  sitting up in bed  and awake & alert. No family present at bedside.     Symptom assessment:   Pain assessment:   24 hour (2884-3724) OME total: 175 mg (IV dilaudid 0.5 mg + OxyIR 40mg + OxyContin 30mg + Morphine ER 60mg)   OME 217mg   .5  Current pain: improved with current regimen, states has gone down to 3/10 but as high as 8/10 as meds wear off, located RUQ abd,   States he had a pain exacerbation last night after eating \"orange chicken\" stomach was \"rumbling and painful\" had BM after and with taking IV dilaudid x1 and OxyIR it resolved and he was able to sleep better. Denies any adverse effects from morphine including dizziness, hallucinations or unsteadiness. Feels it did help him sleep and if not awakened by staff during the night he would have slept longer. He is satisfied with the current regimen and ready for discharge today.   Pain goal: 5-6/10    See summary of discussion below.     Review of Systems:    Symptoms(s): Pain    Dyspnea: denies  Cough: denies  Nausea: denies  Appetite: good  Pain: as above  Constipation: no  Last BM:   Bowel Movement          9/19/2024  0840             Stool Count Calculated for I/O: 1            Allergies:  Allergies   Allergen Reactions    Oxaliplatin HIVES     Shaking        Medications:     Current Facility-Administered Medications:     HYDROmorphone (Dilaudid) 1 MG/ML injection 0.5 mg, 0.5 mg, Intravenous, Q4H PRN    morphINE ER (MS Contin) tab 30 mg, 30 mg, Oral, 2 times per day    losartan (Cozaar) tab 100 mg, 100 mg, Oral, Daily    oxyCODONE immediate release tab 10 mg, 10 mg, Oral, Q4H PRN    pregabalin (Lyrica) cap 25 mg, 25 mg, Oral, BID    cetirizine (ZyrTEC) tab 10 mg, 10 mg, Oral, Daily    heparin (Porcine) 100 Units/mL lock flush 500 Units, 5 mL, Intravenous, PRN    apixaban (Eliquis) tab 5 mg, 5 mg, Oral, BID    dicyclomine (Bentyl) cap 10 mg, 10 mg, Oral, Q6H PRN    sertraline (Zoloft) tab 200 mg, 200 mg, Oral, Daily    pantoprazole (Protonix) DR tab 40 mg, 40 mg, Oral, QAM AC    ondansetron (Zofran-ODT) disintegrating tab 8 mg, 8 mg, Oral, Q8H PRN    OLANZapine (ZyPREXA) tab 5 mg, 5 mg, Oral, Nightly    metoprolol tartrate (Lopressor) tab 25 mg, 25 mg, Oral, 2x Daily(Beta Blocker)    furosemide (Lasix) tab 20 mg, 20 mg, Oral, Daily    acetaminophen (Tylenol Extra Strength) tab 500 mg, 500 mg, Oral, Q4H PRN    melatonin tab 3 mg, 3 mg, Oral, Nightly PRN    glycerin-hypromellose- (Artificial Tears) 0.2-0.2-1 % ophthalmic solution 1 drop, 1 drop, Both Eyes, QID PRN    sodium chloride (Saline Mist) 0.65 % nasal solution 1 spray, 1 spray, Each Nare, Q3H PRN    polyethylene glycol (PEG 3350) (Miralax) 17 g oral packet 17 g, 17 g, Oral, Daily PRN    sennosides (Senokot) tab 17.2 mg, 17.2 mg, Oral, Nightly PRN    bisacodyl (Dulcolax) 10 MG rectal suppository 10 mg, 10 mg, Rectal, Daily PRN    fleet enema (Fleet) rectal enema 133 mL, 1 enema, Rectal, Once PRN    ondansetron (Zofran) 4 MG/2ML injection 4 mg, 4 mg, Intravenous, Q6H PRN    prochlorperazine (Compazine) 10 MG/2ML injection 5 mg, 5 mg, Intravenous, Q8H  PRN    Objective     Vital Signs:  Blood pressure (!) 145/92, pulse 74, temperature 97.7 °F (36.5 °C), temperature source Oral, resp. rate 16, height 6' 2\" (1.88 m), weight 155 lb (70.3 kg), SpO2 99%.  Body mass index is 19.9 kg/m².    Physical Exam:  General: Alert & Awake. In no apparent respiratory distress. Body habitus Thin   HEENT: AT/NC. No gross focal deficits. MMM   Lungs: Normal effort on RA  Abdomen: Soft, tender R  Extremities: No LE edema present  Neurologic: Alert and oriented to person, place, time, and situation   Psychiatric: Mood pleasant mood   Skin: Warm and dry, Jaundice appears to be resolved    Prior to admission Palliative performance scale PPSv2 (%): 90  Observed during hospitalization: 90 % up walking in halls    % Ambulation Activity Level Self-Care Intake Consciousness   100 Full  Normal  No Disease Full Normal Full   90 Full  Normal  Some Disease Full Normal Full   80 Full  Normal w/effort  Some Disease Full Normal or reduced Full   70 Reduced  Can't Perform Job  Some Disease Full Normal or reduced Full   60 Reduced  Can't Perform Hobby   Significant Disease Occ Assist Normal or reduced Full or confused   50 Mainly sit/lie Can't do any work  Extensive Disease Partial Assist Normal or reduced Full or confused   40 Mainly in bed Can't do any work  Extensive Disease Mainly Assist Normal or reduced Full or confused   30 Bed Bound Can't do any work  Extensive Disease Max Assist  Total Care Reduced  Drowsy/confused   20 Bed Bound Can't do any work  Extensive Disease Max Assist  Total Care Minimal  Drowsy/confused   10 Bed Bound Can't do any work  Extensive Disease Max Assist  Total Care Mouth Care  Drowsy/confused   0 Death        Hematology:  Lab Results   Component Value Date    WBC 6.5 09/18/2024    HGB 10.4 (L) 09/18/2024    HCT 33.1 (L) 09/18/2024    .0 09/18/2024       Coags:  Lab Results   Component Value Date    PT 20.4 (H) 01/30/2014    INR 1.17 09/10/2024    PTT 80.5 (H)  06/19/2024       Chemistry:  Lab Results   Component Value Date    CREATSERUM 0.45 (L) 09/18/2024    BUN 7 (L) 09/18/2024     09/18/2024    K 3.9 09/19/2024     09/18/2024    CO2 30.0 09/18/2024    GLU 89 09/18/2024    CA 8.6 (L) 09/18/2024    ALB 3.0 (L) 09/18/2024    ALKPHO 514 (H) 09/18/2024    BILT 3.5 (H) 09/18/2024    TP 5.4 (L) 09/18/2024    AST 93 (H) 09/18/2024     (H) 09/18/2024    DDIMER 0.38 12/19/2023    BNP 33 08/15/2013    MG 1.8 09/20/2024    PHOS 2.9 03/15/2024    TROP <0.046 07/18/2017       Imaging:  No results found.    Summary of Discussion      Focus on symptom management  As per previous note, regimen for long acting opioid changed due to cost of OxyContin. He is tolerating Morphine ER without side effects and required only one dose of IV dilaudid after what he described as \"severe indigestion pain\" from eating that has resolved.   Continue present plan at discharge:   Morphine ER 30mg BID  Continue OxyIR 10mg q4hrs prn breakthrough pain  Continue increased dose of Lyrica 25mg BID  Above Rx's have been sent to his pharmacy and are ready for  today. He has been instructed that he should not drive while taking these meds for now.     Advance Care Planning counseling and discussion:  HCPOA:  Document on file Yes [x] No []. Requested for file []  Healthcare Agent Appointed: Yes  Healthcare Agent's Name: Alysha Mcduffie - wife     Patient's wishes noted on form:  QOL/quantity [x] prolongation [].    Code Status:  Full Code    Inpatient Problem List:   Principal Problem:    Transaminitis  Active Problems:    Biliary obstruction (HCC)    Hyperbilirubinemia    Esophageal adenocarcinoma (HCC)    Cancer associated pain        Assessment and Recommendation     Goals of care: established and treatment focused   Plan to f/u with oncology as OP for modified regimen. Awaiting recent liver bx to determine tx options.   Continued pain control while maintaining function  Advanced Care  Planning:  Code Status: Full Code  HCPOA:  Document on file  Healthcare Agent's Name: Alysha Mcduffie - wife  Symptoms  Pain: controlled with current regimen. From a palliative/symptom management standpoint he is ready for discharge.   Morphine ER 30mg BID  Continue OxyIR 10mg q4hrs prn breakthrough pain  Continue increased dose of Lyrica 25mg BID  Above Rx's have been sent to his pharmacy and are ready for  today. He has been instructed that he should not drive while taking these meds for now.    Discussed today's visit with  RN, Care team, and hospitalist.    Palliative Care Follow Up: Palliative care team will Continue to follow while inpatient. Discharged planned for today.   Palliative care follow up at discharge: Established with [facility]. Video visit follow up with Lexy Stewart scheduled for 9/24, details in My chart and on AVS    Thank you for allowing Palliative Care services to participate in the care of Dontae Goodwin Diego Champion.    A total of 25 minutes were spent on this visit, which included all of the following: chart review, direct face to face contact, history taking, physical examination, counseling and coordinating care, and documentation    ARTEMIO Ray  Palliative Care   Phone: [phone]     9/20/2024  10:21 AM  Palliative Care Services    The 21st Century Cures Act makes medical notes like these available to patients in the interest of transparency. Please be advised this is a medical document. Medical documents are intended to carry relevant information, facts as evident, and the clinical opinion of the practitioner. The medical note is intended as peer to peer communication and may appear blunt or direct. It is written in medical language and may contain abbreviations or verbiage that are unfamiliar.

## 2024-09-20 NOTE — PROGRESS NOTES
[facility] Progress Note   Length of Stay: 5    Subjective:   -Path came back HER2 negative   -Discussed FOLFIRI +/- Kvng vs. Paclitaxel 80 mg weekly 3 weeks on 1 week off    Oncology History   -2018: Per report had a normal EGD and colonoscopy     -June 2022: He met with GI due to new onset dysphagia which started about 1 month prior to his visit.  He had an esophagram with a focal abrupt narrowing with irregular margins in the distal one third of the esophagus extending to the GE junction.  He also had an episode of food impaction. He has also lost about 15 lb. He was a heavy smoker from age 14 to about 40 (1.5 PPD). Also with alcohol use currently. Mother with a history of breast and colon cancer.  EGD and EUS with Dr. Yadav on 6/7/2022: Severe esophagitis with a concerning mass extending 37-39 centimeters from the incisors.  Nonobstructive mass.  By EUS uT3N1 disease.  Pathology showed invasive moderate to poorly differentiated adenocarcinoma.  HER2 amplified by FISH. CT CAP done at [facility] on 6/16/22: Results not loaded to PACs yet.  CA 19-9 from the same day was 107.4.  CEA normal. PET/CT on 6/20/2022: Increased distal esophageal uptake with an SUV of 14.4.  Concern for shreya metastases.     -Concurrent chemoradiation with carboplatin AUC2 plus paclitaxel 50 mg/m2: July 2022-August 2022 with  down (280-->30). Post treatment PET/CT showed improvement.      -Surgery with Dr. Lu on 9/30/22: ypT1b pN0. Recovery has been complicated by an esophageal leak,      -I met with him on 12/12/22. We discussed adjuvant opdivo for 1 year. His  was elevated to 48 and repeat was 64. CT CAP was recommended.      -He was admitted on 12/25/22 for abdominal pain. He had a POEM procedure done. He was also noted to have a RLL consolidation. He was seen by pulmonary, based on imaging an outpatient PET/CT was recommended and a biopsy of the most FDG avid lesion would be considered. Noted  to have CAD and an outpatient evaluation was recommended. Outpatient PET/CT done on 1/4/23 was reviewed in tumor board and there was no focal area of concern and adjuvant immunotherapy recommended.      -Adjuvant opdivo:01/2023-03/2023. PET/CT in in 04/2023 showed uptake in the pancreatic head and tail. Also with some uptake in liver. EGD/EUS on 4/27/23 with Dr. Ritchie: Pancreatic head mass positive for adenocarcinoma: Comparison to previous esophageal cancer shows similarity but IHC in non-specific. Her 2 IHC was 2+ on this specimen but FISH was negative.  Given discordant results on initial HER2 and follow-up HER2 we decided to proceed with chemotherapy with immunotherapy with HER2 directed therapy.     -Chemo + Herceptin + Immunotherapy: He received 7 cycles of FOLFOX + Herceptin + Immunotherapy (05/2023-09/2023). Imaging after C5 showed a favorable response. After C7, we held oxaliplatin due to neuropathy and he was started on maintenance herceptin + IO maintenance. Signatera was negative 09/11/23.     -Herceptin + IO Maintenance: He received 3 cycles from 09/25/23-10/31/23. Treatment was delayed due to hospitalizations.      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 Due to new findings-restarting FOLFOX discussed.      -admitted from 1/8/24-1/13/24 for persistent dysphagia and bronchesophageal stent.      -1/17/24: EGD with fistula closure with ASD occluder and removal of the bronchial stent.     -FOLFOX + Herceptin + Opdivo restarted: 3 cycles: 02/2024-03/2024     -Treatment delayed again for recurrent hospital admissions     -He underwent a Latissimus dorsi flap reconstruction for  his bronchoesophageal fistula on 5/1/24     -PET/CT 5/15/24: at least 3 areas of uptake in the right hepatic lobe, uptake in pancrease and retroperitoneum. Post-operative uptake in chest wall.      -FOLFOX + Herceptin + Nivolumab Restarted: 6/11/2024 until 8/14/2024.  He received 5 cycles.  A PET scan after 5 cycles showed overall progression of metastatic disease with new gastrohepatic and peripancreatic lymph nodes along with enlargement of pancreatic and hepatic metastases.  There were also new lung nodules.  A CT-guided liver biopsy on 9/10/2024 was done which was positive for metastatic adenocarcinoma and the immunoprofile is compatible with the patient's esophageal primary. HER negative on FISH.     -He was admitted on 9/15/24 for abdominal pain.  Labs showed elevated AST/ALT and T bili (6.7). CTA CAP showed progression of pancreatic and hepatic masses. There is intra-extra hepatic biliary ductal dilation. Subpleural reticular opacities noted-inflammatory or interstitial process? Hi Res CT recommended. He underwent an ERCP which showed a CBD stricture with biliary sphincterectomy and CBD, PD bile duct stent placement with Dr. Ritchie.     ROS: 12 Point ROS completed and pertinent positives are above     Objective:    morphINE ER  30 mg Oral 2 times per day    losartan  100 mg Oral Daily    pregabalin  25 mg Oral BID    cetirizine  10 mg Oral Daily    apixaban  5 mg Oral BID    sertraline  200 mg Oral Daily    pantoprazole  40 mg Oral QAM AC    OLANZapine  5 mg Oral Nightly    metoprolol tartrate  25 mg Oral 2x Daily(Beta Blocker)    furosemide  20 mg Oral Daily       HYDROmorphone    oxyCODONE    heparin    dicyclomine    ondansetron    acetaminophen    melatonin    glycerin-hypromellose-    sodium chloride    polyethylene glycol (PEG 3350)    sennosides    bisacodyl    fleet enema    ondansetron    prochlorperazine    Physical Exam  Vitals:    09/20/24 0330   BP: (!) 145/92   Pulse: 74   Resp: 16    Temp: 97.7 °F (36.5 °C)     General: NAD, AOX3, jaundiced  HEENT: clear op, mmm, no jvd. EOM intact, positive scleral icterus   CV: RRR S1S2 no murmurs  Extremities: No edema  Lungs: CTAB, no increased work of breathing  Abd: soft nt nd +BS no hepatosplenomegaly, surgical scars  Neuro: CN: II-XII grossly intact  Psych: Normal Mood and affect     Labs/Imaging/Path:  Lab Results   Component Value Date    WBC 6.5 09/18/2024    HGB 10.4 (L) 09/18/2024    HCT 33.1 (L) 09/18/2024    MCV 93.5 09/18/2024    .0 09/18/2024       Recent Labs   Lab 09/16/24  0653 09/17/24  0624 09/18/24  0706 09/19/24  0541   GLU 96 89 89  --    BUN 9 7* 7*  --    CREATSERUM 0.53* 0.51* 0.45*  --    CA 8.7 8.7 8.6*  --    ALB 3.0* 3.1* 3.0*  --     137 139  --    K 4.1 3.8 3.3* 3.9    103 103  --    CO2 30.0 26.0 30.0  --    ALKPHO 465* 543* 514*  --    * 164* 93*  --    * 293* 225*  --    BILT 6.7* 7.2* 3.5*  --    TP 5.4* 5.6* 5.4*  --        Imaging: Reviewed     Assessment and Plan:   Jaundice 2/2 elevated bilirubin which is likely related to metastatic disease/CBD stricture. He is s/p ERCP on 9/17/24 with CBD, PD stent placement.      Metastatic esophageal cancer  -Most recent imaging is consistent with metastatic disease progression.  Repeat HER2 is negative on FISH. We discussed that he will not be a good candidate for Cyramza due to history of esophageal fistula and PE/DVT. We discussed palliative FOLFIRI and paclitaxel. WE will plan on weekly paclitaxel (3 weeks on 1 week off) with repeat imaging after C2.      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 Now s/p repair 5/1/24.     COURTNEY Foster MD  [facility]

## 2024-09-23 ENCOUNTER — OFFICE VISIT (OUTPATIENT)
Dept: HEMATOLOGY/ONCOLOGY | Age: 54
End: 2024-09-23
Attending: INTERNAL MEDICINE
Payer: COMMERCIAL

## 2024-09-23 ENCOUNTER — SOCIAL WORK SERVICES (OUTPATIENT)
Dept: HEMATOLOGY/ONCOLOGY | Facility: HOSPITAL | Age: 54
End: 2024-09-23

## 2024-09-23 VITALS
RESPIRATION RATE: 16 BRPM | HEIGHT: 73.11 IN | BODY MASS INDEX: 21.24 KG/M2 | TEMPERATURE: 97 F | OXYGEN SATURATION: 98 % | WEIGHT: 162 LBS | HEART RATE: 60 BPM | SYSTOLIC BLOOD PRESSURE: 110 MMHG | DIASTOLIC BLOOD PRESSURE: 69 MMHG

## 2024-09-23 DIAGNOSIS — C15.5 MALIGNANT NEOPLASM OF LOWER THIRD OF ESOPHAGUS (HCC): Primary | ICD-10-CM

## 2024-09-23 DIAGNOSIS — G89.3 NEOPLASM RELATED PAIN: ICD-10-CM

## 2024-09-23 LAB
ALBUMIN SERPL-MCNC: 2.5 G/DL (ref 3.4–5)
ALBUMIN/GLOB SERPL: 0.7 {RATIO} (ref 1–2)
ALP LIVER SERPL-CCNC: 383 U/L
ALT SERPL-CCNC: 97 U/L
ANION GAP SERPL CALC-SCNC: 4 MMOL/L (ref 0–18)
AST SERPL-CCNC: 36 U/L (ref 15–37)
BASOPHILS # BLD AUTO: 0.04 X10(3) UL (ref 0–0.2)
BASOPHILS NFR BLD AUTO: 0.5 %
BILIRUB SERPL-MCNC: 1.9 MG/DL (ref 0.1–2)
BUN BLD-MCNC: 16 MG/DL (ref 9–23)
CALCIUM BLD-MCNC: 8.4 MG/DL (ref 8.5–10.1)
CANCER AG19-9 SERPL-ACNC: ABNORMAL U/ML (ref ?–35)
CHLORIDE SERPL-SCNC: 103 MMOL/L (ref 98–112)
CO2 SERPL-SCNC: 27 MMOL/L (ref 21–32)
CREAT BLD-MCNC: 0.53 MG/DL
EGFRCR SERPLBLD CKD-EPI 2021: 119 ML/MIN/1.73M2 (ref 60–?)
EOSINOPHIL # BLD AUTO: 0.29 X10(3) UL (ref 0–0.7)
EOSINOPHIL NFR BLD AUTO: 3.3 %
ERYTHROCYTE [DISTWIDTH] IN BLOOD BY AUTOMATED COUNT: 14.9 %
GLOBULIN PLAS-MCNC: 3.6 G/DL (ref 2.8–4.4)
GLUCOSE BLD-MCNC: 107 MG/DL (ref 70–99)
HCT VFR BLD AUTO: 32.7 %
HGB BLD-MCNC: 10.2 G/DL
IMM GRANULOCYTES # BLD AUTO: 0.15 X10(3) UL (ref 0–1)
IMM GRANULOCYTES NFR BLD: 1.7 %
LYMPHOCYTES # BLD AUTO: 0.84 X10(3) UL (ref 1–4)
LYMPHOCYTES NFR BLD AUTO: 9.7 %
MCH RBC QN AUTO: 30.4 PG (ref 26–34)
MCHC RBC AUTO-ENTMCNC: 31.2 G/DL (ref 31–37)
MCV RBC AUTO: 97.3 FL
MONOCYTES # BLD AUTO: 1.09 X10(3) UL (ref 0.1–1)
MONOCYTES NFR BLD AUTO: 12.5 %
NEUTROPHILS # BLD AUTO: 6.28 X10 (3) UL (ref 1.5–7.7)
NEUTROPHILS # BLD AUTO: 6.28 X10(3) UL (ref 1.5–7.7)
NEUTROPHILS NFR BLD AUTO: 72.3 %
OSMOLALITY SERPL CALC.SUM OF ELEC: 280 MOSM/KG (ref 275–295)
PLATELET # BLD AUTO: 262 10(3)UL (ref 150–450)
POTASSIUM SERPL-SCNC: 3.8 MMOL/L (ref 3.5–5.1)
PROT SERPL-MCNC: 6.1 G/DL (ref 6.4–8.2)
RBC # BLD AUTO: 3.36 X10(6)UL
SODIUM SERPL-SCNC: 134 MMOL/L (ref 136–145)
WBC # BLD AUTO: 8.7 X10(3) UL (ref 4–11)

## 2024-09-23 PROCEDURE — 86301 IMMUNOASSAY TUMOR CA 19-9: CPT

## 2024-09-23 PROCEDURE — G2211 COMPLEX E/M VISIT ADD ON: HCPCS | Performed by: INTERNAL MEDICINE

## 2024-09-23 PROCEDURE — 80053 COMPREHEN METABOLIC PANEL: CPT

## 2024-09-23 PROCEDURE — S0028 INJECTION, FAMOTIDINE, 20 MG: HCPCS | Performed by: INTERNAL MEDICINE

## 2024-09-23 PROCEDURE — 96375 TX/PRO/DX INJ NEW DRUG ADDON: CPT

## 2024-09-23 PROCEDURE — 85025 COMPLETE CBC W/AUTO DIFF WBC: CPT

## 2024-09-23 PROCEDURE — 99215 OFFICE O/P EST HI 40 MIN: CPT | Performed by: INTERNAL MEDICINE

## 2024-09-23 PROCEDURE — 96413 CHEMO IV INFUSION 1 HR: CPT

## 2024-09-23 RX ORDER — DIPHENHYDRAMINE HYDROCHLORIDE 50 MG/ML
25 INJECTION INTRAMUSCULAR; INTRAVENOUS ONCE
Status: COMPLETED | OUTPATIENT
Start: 2024-09-23 | End: 2024-09-23

## 2024-09-23 RX ORDER — FAMOTIDINE 10 MG/ML
20 INJECTION, SOLUTION INTRAVENOUS ONCE
Status: CANCELLED | OUTPATIENT
Start: 2024-09-23

## 2024-09-23 RX ORDER — FAMOTIDINE 10 MG/ML
20 INJECTION, SOLUTION INTRAVENOUS ONCE
Status: COMPLETED | OUTPATIENT
Start: 2024-09-23 | End: 2024-09-23

## 2024-09-23 RX ORDER — DIPHENHYDRAMINE HYDROCHLORIDE 50 MG/ML
25 INJECTION INTRAMUSCULAR; INTRAVENOUS ONCE
Status: CANCELLED | OUTPATIENT
Start: 2024-09-23

## 2024-09-23 RX ORDER — BACLOFEN 10 MG/1
10 TABLET ORAL 3 TIMES DAILY
Qty: 30 TABLET | Refills: 0 | Status: SHIPPED | OUTPATIENT
Start: 2024-09-23

## 2024-09-23 RX ADMIN — FAMOTIDINE 20 MG: 10 INJECTION, SOLUTION INTRAVENOUS at 10:20:00

## 2024-09-23 RX ADMIN — DIPHENHYDRAMINE HYDROCHLORIDE 25 MG: 50 INJECTION INTRAMUSCULAR; INTRAVENOUS at 10:18:00

## 2024-09-23 NOTE — PROGRESS NOTES
Patient here for follow-up and planned C1D1 taxol. States he is feeling better since being home. Denies any updates or changes in symptoms. Continues on Eliquis.

## 2024-09-23 NOTE — PAYOR COMM NOTE
--------------  CONTINUED STAY REVIEW  9/17-9/20  Payor: [employer] P[employer]  Subscriber #:  [id number]  Authorization Number: [id number]    Admit date: 9/15/24  Admit time:  5:25 PM    REVIEW DOCUMENTATION:    9/17    Chief Complaint: Abdominal pain        Subjective:  Patient with continued abdominal pain, not much improvement from day prior, still requiring iv pain meds.  Denies any other acute complaints.        Temp:  [97.2 °F (36.2 °C)-98.5 °F (36.9 °C)] 97.7 °F (36.5 °C)  Pulse:  [47-66] 66  Resp:  [15-20] 15  BP: (139-170)/(67-84) 163/84  SpO2:  [96 %-98 %] 96 %      Lab 09/10/24  0921 09/15/24  1225 09/16/24  0653 09/17/24  0624   WBC  --  6.4 5.1 5.9   HGB  --  11.9* 10.7* 10.9*   MCV  --  92.7 93.4 92.8   PLT  --  205.0 169.0 180.0   INR 1.17  --   --   --      Scheduled Medications    [Held by provider] apixaban  5 mg Oral BID    sertraline  200 mg Oral Daily    pregabalin  25 mg Oral Nightly    pantoprazole  40 mg Oral QAM AC    OLANZapine  5 mg Oral Nightly    metoprolol tartrate  25 mg Oral 2x Daily(Beta Blocker)    furosemide  20 mg Oral Daily    losartan  50 mg Oral Daily                  Assessment & Plan:  #Abdominal pain  #Jaundice - likely obstructive   #Elevated liver enzymes  Could be obstruction given his CT showing size progression of liver mass  Pain control, anti-emetics   Bilirubin up from 6.7 -> 7.2, downtrending ast/alt  GI consult - ERCP planned for today      #Metastatic adenocarcinoma  Had liver biopsy done on 9/10/24 - awaiting final biopsy report      # Metastatic esophageal carcinoma  # Metastatic pancreatic carcinoma  s/p esophagectomy and 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 and ASD occluder device   CT abd showing pancreatic mass that progressed in size, progressed hepatic metastasis   Hem/onc consult - awaiting HER2 biopsy report      #Previous PE/DVT  Eliquis - on hold for possible ERCP, will resume tonight, heparin proph for now    #Essential  HTN - losartan, metoprolol, furosemide   #DLD  #GERD/PUD - PPI  #CAD s/p CABG  #Depression/Anxiety - Zoloft, Olanzapine          9/17    PREOPERATIVE DIAGNOSIS/INDICATION: Malignant biliary obstruction  POSTOPERTATIVE DIAGNOSIS: Same  PROCEDURE PERFORMED: ERCP with biliary sphincterotomy and CBD, PD stent placement    FINDINGS:  DUODENUM: Normal  MAJOR AMPULLA: Normal  ERP: Guidewire cannulation only  ERC: The CBD showed distal 3 cm stricture with upstream dilatation of the CHD, the confluence of the right and left hepatic ducts and the intrahepatics, the cystic duct and the gallbladder were poor ly visualized and appear grossly normal.  THERAPEUTICS: The CBD and PD were cannulated using a standard 0.035 [place] scientific  Hydratome RX44, 20mm cut wire, deep cannulation was performed with the help of a 0.035 straight Acrobat wire 260cm in length, an adequate biliary sphincterotomy was performed with standard ERBE settings, there were no immediate complication noted. We placed a biliary biliary fully covered wallstent size 10 x 60 mm successfully; at the end of the procedure the proximal end was located at the CHD and the distal end in the duodenum. We placed a pancreas Geenen plastic stent size 5 Fr x 3 cm with no internal flanges successfully; at the end of the procedure the proximal end was located at the pancreas head and the distal end in the duodenum  RECOMMENDATIONS:   Post ERCP precautions, watch for bleeding, infection, perforation, adverse drug reactions and pancreatitis.  CMP, CBC in AM.  Call status report post procedure.  Trial of clears and advance as tolerated  KUB in 1 week        9/18  Reason for Consultation   Biliary obstruction      Subjective      -Feels better today, had some mild abd pain post-procedure, responded to meds  -Tolerated dinner well  -Passing flatus + BM       Lab 09/15/24  1225 09/16/24  0653 09/17/24  0624 09/18/24  0705   WBC 6.4 5.1 5.9 6.5   HGB 11.9* 10.7* 10.9* 10.4*   PLT  205.0 169.0 180.0 197.0                 Recent Labs    Lab 09/15/24  1225 09/16/24  0653 09/17/24  0624 09/18/24  0706     137 137 139    K 4.1 4.1 3.8 3.3*     104 103 103    CO2 29.0 30.0 26.0 30.0    BUN 9 9 7* 7*    CREATSERUM 0.64* 0.53* 0.51* 0.45*                  Recent Labs    Lab 09/15/24  1225 09/16/24  0653 09/17/24  0624 09/18/24  0706    * 358* 293* 225*    * 209* 164* 93*      Assessment       52 y/o male with a h/o metastatic adenocarcinoma of the esophagus presenting with biliary obstruction, RUQ pain, jaundice. Biliary obstruction due to extrahepatic mets  -Status post ERCP with stenting 9/17/24 by Dr. Ritchie  -No apparent post-procedural complications.              9/18    Chief Complaint: Abdominal pain        Subjective:  Patient seen and examined. Abdominal pain persists.     Temp:  [97.3 °F (36.3 °C)-97.8 °F (36.6 °C)] 97.8 °F (36.6 °C)  Pulse:  [51-69] 51  Resp:  [16-20] 18  BP: (146-183)/(78-98) 166/78  SpO2:  [94 %-100 %] 99 %    Scheduled Medications    losartan  100 mg Oral Daily    heparin  5,000 Units Subcutaneous Q8H INNA    cetirizine  10 mg Oral Daily    [Held by provider] apixaban  5 mg Oral BID    sertraline  200 mg Oral Daily    pregabalin  25 mg Oral Nightly    pantoprazole  40 mg Oral QAM AC    OLANZapine  5 mg Oral Nightly    metoprolol tartrate  25 mg Oral 2x Daily(Beta Blocker)    furosemide  20 mg Oral Daily                  Assessment & Plan:  #Elevated liver enzymes secondary to malignant biliary obstruction s/p ERCP with biliary sphincterotomy and CBD/PD stent placement 9/17  GI following  LFTs improving  Pain control, anti-emetics      #Metastatic adenocarcinoma  Had liver biopsy done on 9/10/24 - awaiting final biopsy report      # Metastatic esophageal carcinoma  # Metastatic pancreatic carcinoma  s/p esophagectomy and 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 and ASD occluder device   CT abd showing pancreatic mass that  progressed in size, progressed hepatic metastasis   Hem/onc following     #Cancer related pain  -Without significant improvement following treatment of biliary obstruction  -Palliative care consulted      #Previous PE/DVT  Resume DOAC tonight     #Essential HTN - losartan, metoprolol, furosemide   #DLD  #GERD/PUD - PPI  #CAD s/p CABG  #Depression/Anxiety - Zoloft, Olanzapine             9/19    Chief Complaint: Abdominal pain        Subjective:  Patient seen and examined. Abdominal waxes and wanes. IV dilaudid seems to work best.        Temp:  [96.3 °F (35.7 °C)-98 °F (36.7 °C)] 97.4 °F (36.3 °C)  Pulse:  [54-75] 75  Resp:  [16-20] 16  BP: (123-141)/(57-73) 123/66  SpO2:  [95 %-100 %] 95 %      Lab 09/16/24  0653 09/17/24  0624 09/18/24  0706 09/19/24  0541   GLU 96 89 89  --    BUN 9 7* 7*  --    CREATSERUM 0.53* 0.51* 0.45*  --    CA 8.7 8.7 8.6*  --    ALB 3.0* 3.1* 3.0*  --     137 139  --    K 4.1 3.8 3.3* 3.9     Scheduled Medications    oxyCODONE ER  30 mg Oral 2 times per day    losartan  100 mg Oral Daily    pregabalin  25 mg Oral BID    cetirizine  10 mg Oral Daily    apixaban  5 mg Oral BID    sertraline  200 mg Oral Daily    pantoprazole  40 mg Oral QAM AC    OLANZapine  5 mg Oral Nightly    metoprolol tartrate  25 mg Oral 2x Daily(Beta Blocker)    furosemide  20 mg Oral Daily                  Assessment & Plan:  #Elevated liver enzymes secondary to malignant biliary obstruction s/p ERCP with biliary sphincterotomy and CBD/PD stent placement 9/17  GI following  LFTs improving  Pain control, anti-emetics      #Metastatic adenocarcinoma  Had liver biopsy done on 9/10/24 - awaiting final biopsy report      # Metastatic esophageal carcinoma  # Metastatic pancreatic carcinoma  s/p esophagectomy and 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 and ASD occluder device   CT abd showing pancreatic mass that progressed in size, progressed hepatic metastasis   Hem/onc following     #Cancer  related pain  -Without significant improvement following treatment of biliary obstruction  -Palliative care following - adjustments made  -D/w GI regarding possibility of celiac block - Dr. Cardona to d/w Dr. Ritchie - this can be done as OP if feasible and patient agreeable        #Previous PE/DVT  Resume DOAC tonight     #Essential HTN - losartan, metoprolol, furosemide   #DLD  #GERD/PUD - PPI  #CAD s/p CABG  #Depression/Anxiety - Zoloft, Olanzapine     Medications 09/17/24 09/18/24 09/19/24 09/20/24   dicyclomine (Bentyl) cap 10 mg  Dose: 10 mg  Freq: Every 6 hours PRN Route: OR  PRN Comment: indigestion, bloating  Start: 09/15/24 1732 End: 09/20/24 1240      1846 ZA-Given       1240-D/C'd           Or   HYDROmorphone (Dilaudid) 1 MG/ML injection 0.8 mg  Dose: 0.8 mg  Freq: Every 2 hour PRN Route: IV  PRN Reason: severe pain  Start: 09/15/24 1732 End: 09/18/24 1534   Admin Instructions:   Use PRN reason as a guide and follow range order policy. If oral pain meds are ordered and patient can tolerate oral intake, start with PRN oral pain medications first.    0121 KK-Given   0413 KK-Given     0651 KK-Given   0854 AF-Given     1052 AF-Given   1259 AF-Given     1806 AF-Given   2016 CD-Given     2213 CD-Given       0049 CD-Given   0257 AB-Given     0443 CD-Given   0641 CD-Given     0844 ZA-Given   1042 EM-Given     1305 ZA-Given   1506 ZA-Given     1534-D/C'd            Or   HYDROmorphone (Dilaudid) 1 MG/ML injection 0.6 mg  Dose: 0.6 mg  Freq: Every 2 hour PRN Route: IV  PRN Reason: moderate pain  Start: 09/18/24 1531 End: 09/19/24 1003   Admin Instructions:   Use PRN reason as a guide and follow range order policy. If oral pain meds are ordered and patient can tolerate oral intake, start with PRN oral pain medications first.     1717 ZA-Given                               1003-D/C'd        Or   HYDROmorphone (Dilaudid) 1 MG/ML injection 0.8 mg  Dose: 0.8 mg  Freq: Every 2 hour PRN Route: IV  PRN Reason: severe  pain  Start: 09/18/24 1531 End: 09/19/24 1003   Admin Instructions:   Use PRN reason as a guide and follow range order policy. If oral pain meds are ordered and patient can tolerate oral intake, start with PRN oral pain medications first.        1917 ZA-Given     2118 AB-Given       0010 AB-Given   0235 AB-Given     0511 AB-Given   0740 ZA-Given     1003-D/C'd        HYDROmorphone (Dilaudid) 1 MG/ML injection 0.5 mg  Dose: 0.5 mg  Freq: Every 4 hours PRN Route: IV  PRN Reason: severe pain  PRN Comment: only for pain not controlled with PO opioids, use OxyIR first  Start: 09/19/24 1002 End: 09/20/24 1240      2135 AB-Given       1240-D/C'd                   indomethacin (Indocin) 50 MG rectal suppository  Freq: Intra-op PRN  Start: 09/17/24 1622 End: 09/17/24 1753    1622 KD-Given   1753-D/C'd         iohexol (OMNIPAQUE) 350 MG/ML injection  Freq: Intra-op PRN  Start: 09/17/24 1628 End: 09/17/24 1753    1628 GP-Given [C]   1753-D/C'd                     oxyCODONE immediate release tab 10 mg  Dose: 10 mg  Freq: Every 4 hours PRN Route: OR  PRN Reasons: moderate pain,severe pain  Start: 09/18/24 1531 End: 09/20/24 1240     1557 ZA-Given   2137 EC-Given      0552 AB-Given   1215 ZA-Given     1816 ZA-Given   2212 AB-Given      0414 AB-Given   0952 EM-Given     1240-D/C'd       oxyCODONE immediate release tab 5 mg  Dose: 5 mg  Freq: Every 4 hours PRN Route: OR  PRN Reasons: moderate pain,severe pain  Start: 09/15/24 1732 End: 09/18/24 1534    0450 KK-Given   1015 AF-Given     1945 CD-Given       0523 CD-Given   1145 ZA-Given     1534-D/C'd           09/20/24 0330 97.7 °F (36.5 °C) 74 16 145/92 Abnormal  99 % -- None (Room air) -- AB   09/19/24 1930 98.5 °F (36.9 °C) 56 18 134/76 96 % -- None (Room air) -- CONNIE   09/19/24 1343 97.4 °F (36.3 °C) -- 16 -- -- -- None (Room air) -- SC   09/19/24 0319 96.3 °F (35.7 °C) Abnormal  -- 18 123/66 95 % -- None (Room air) -- CC   09/18/24 2316 98 °F (36.7 °C) 75 20 134/57 100 % -- None  (Room air) -- CC   24 97.9 °F (36.6 °C) 54 18 141/73 98 % -- -- -- JM   24 1126 97.8 °F (36.6 °C) 51 18 166/78 Abnormal  99 % -- -- -- JH   24 0436 97.5 °F (36.4 °C) 61 18 183/81 Abnormal  98 % -- None (Room air) -- JW   24 2019 97.3 °F (36.3 °C) 58 18 146/81 100 % -- None (Room air) -- CD   24 1805 97.5 °F (36.4 °C) 59 -- 178/86 Abnormal  100 % -- None (Room air) -- JWA   24 1708 -- 58 20 -- 98 % -- -- -- DM   24 1703 -- 58 20 175/85 Abnormal  98 % -- -- -- DM   24 1653 -- 60 20 176/85 Abnormal  99 % -- -- -- DM   24 1648 -- 68 16 182/98 Abnormal  94 % -- -- -- DM   24 1643 -- 69 20 157/93 Abnormal  98 % -- -- -- DM   24 1638 -- 68 20 146/85 97 % -- -- -- DM   24 1132 98 °F (36.7 °C) 53 18 149/88 99 % -- -- -- EV   24 0412 97.7 °F (36.5 °C) 66 15 163/84 Abnormal  96 % -- None (Room air) -- KL             --------------  DISCHARGE REVIEW    Payor: [employer] P[employer]  Subscriber #:  [id number]  Authorization Number: [id number]    Admit date: 9/15/24  Admit time:   5:25 PM  Discharge Date: 2024 10:40 AM     Admitting Physician: Miley Martinez DO  Attending Physician:  No att. providers found  Primary Care Physician: Adrian Horowitz MD          Discharge Summary Notes        Discharge Summary signed by Quinton Concepcion DO at 2024  4:47 PM       Author: Quinton Concepcion DO Specialty: HOSPITALIST Author Type: Physician    Filed: 2024  4:47 PM Date of Service: 2024  4:43 PM Status: Signed    : Quinton Concepcion DO (Physician)           [facility]     Dontae Petersonriniesha Cortez Jr. Patient Status:  Inpatient    10/15/1970 MRN [id number]   Location [facility] 4NW-A Attending No att. providers found   Hosp Day # 5 PCP Adrian Horowitz MD     Date of Admission: 9/15/2024  Date of Discharge:  2024     Discharge Disposition: Home or Self Care    Discharge Diagnosis:  Elevated liver enzymes  secondary to malignant biliary obstruction s/p ERCP with biliary sphincterotomy and CBD/PD stent placement 9/17  Metastatic esophageal carcinoma  Metastatic pancreatic carcinoma   Cancer related pain  Previous PE/DVT  Essential HTN  Dyslipidemia  GERD/PUD  CAD with prior CABG  Depression/anxiety     History of Present Illness: Dontae Cortez Jr. is a 53 year old male with a PMH Of metastatic esophageal and pancreatic cancer who presents with abdominal pain and yellowing of skin.  They have noticed symptoms worsening over the past couple of days.  Denies any n/v, no fevers, no diarrhea.  No other acute complaints.      Brief Synopsis:     #Elevated liver enzymes secondary to malignant biliary obstruction s/p ERCP with biliary sphincterotomy and CBD/PD stent placement 9/17  GI following  LFTs improving  Pain control, anti-emetics      # Metastatic esophageal carcinoma  # Metastatic pancreatic carcinoma  s/p esophagectomy and 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 and ASD occluder device   CT abd showing pancreatic mass that progressed in size, progressed hepatic metastasis   Hem/onc following     #Cancer related pain  -Without significant improvement following treatment of biliary obstruction  -Palliative care following - adjustments made with improvement  -D/w GI regarding possibility of celiac block - Dr. Cardona to d/w Dr. Ritchie - plan for OP GI follow-up to discuss further      #Previous PE/DVT  DOAC    #Essential HTN - losartan, metoprolol, furosemide   #DLD  #GERD/PUD - PPI  #CAD s/p CABG  #Depression/Anxiety - Zoloft, Olanzapine     #Disposition  -Stable for DC home     Lace+ Score: 82  59-90 High Risk  29-58 Medium Risk  0-28   Low Risk       TCM Follow-Up Recommendation:  LACE > 58: High Risk of readmission after discharge from the hospital.      Procedures during hospitalization:   ERCP with biliary sphincterotomy and CBD, PD stent placement     Incidental or significant  findings and recommendations (brief descriptions):  None    Lab/Test results pending at Discharge:   None    Consultants:  GI  Oncology  Palliative care    Discharge Medication List:     Discharge Medications        CHANGE how you take these medications        Instructions Prescription details   losartan 100 MG Tabs  Commonly known as: Cozaar  What changed:   medication strength  how much to take      Take 1 tablet (100 mg total) by mouth daily.   Stop taking on: October 18, 2024  Quantity: 30 tablet  Refills: 0     oxyCODONE 10 MG Tabs  What changed:   medication strength  how much to take  reasons to take this      Take 1 tablet (10 mg total) by mouth every 4 (four) hours as needed (for cancer related pain).   Quantity: 180 tablet  Refills: 0     pregabalin 25 MG Caps  Commonly known as: Lyrica  What changed: when to take this      Take 1 capsule (25 mg total) by mouth 2 (two) times daily.   Quantity: 60 capsule  Refills: 0            CONTINUE taking these medications        Instructions Prescription details   apixaban 5 MG Tabs  Commonly known as: Eliquis      Take 1 tablet (5 mg total) by mouth 2 (two) times daily.   Quantity: 60 tablet  Refills: 2     dicyclomine 10 MG Caps  Commonly known as: Bentyl      Take 1 capsule (10 mg total) by mouth 3 (three) times daily as needed.   Quantity: 90 capsule  Refills: 1     furosemide 20 MG Tabs  Commonly known as: Lasix      Take 1 tablet (20 mg total) by mouth daily.   Quantity: 30 tablet  Refills: 1     metoprolol tartrate 25 MG Tabs  Commonly known as: Lopressor      Take 1 tablet (25 mg total) by mouth 2x Daily(Beta Blocker).   Quantity: 60 tablet  Refills: 1     OLANZapine 5 MG Tabs  Commonly known as: ZyPREXA      Take 1 tablet (5 mg total) by mouth nightly.   Refills: 0     ondansetron 8 MG Tbdp  Commonly known as: Zofran-ODT      Take 1 tablet (8 mg total) by mouth every 8 (eight) hours as needed for Nausea.   Quantity: 30 tablet  Refills: 0     pantoprazole 40  MG Tbec  Commonly known as: Protonix      Take 1 tablet (40 mg total) by mouth before breakfast.   Quantity: 30 tablet  Refills: 0     Potassium Chloride ER 20 MEQ Tbcr      Take 1 tablet by mouth daily.   Quantity: 30 tablet  Refills: 1     prochlorperazine 10 mg tablet  Commonly known as: Compazine      Take 1 tablet (10 mg total) by mouth every 6 (six) hours as needed for Nausea.   Refills: 0     sertraline 100 MG Tabs  Commonly known as: Zoloft      Take 2 tablets (200 mg total) by mouth daily.   Refills: 0            STOP taking these medications      benzonatate 100 MG Caps  Commonly known as: Tessalon        Omeprazole 40 MG Cpdr                  Where to Get Your Medications        These medications were sent to [facility]/pharmacy #[id number] - [place], [place] - [address] [phone], [phone]  [address] [facility] [address]      Phone: [phone]   losartan 100 MG Tabs  oxyCODONE 10 MG Tabs  pregabalin 25 MG Caps         [place]    Follow-up appointment:   Adrian Horowitz MD  [address]  [facility] [address] [phone]    Follow up in 1 week(s)  Follow up    Raheel Ritchie MD  [address] [place] [address] [phone]    Go in 1 month(s)  for a follow-up office visit with GI. The office will call you to arrange date/time of visit to discuss possible endoscopy for your chronic abd pain    Lexy Stewart, ARTEMIO  [address] [place] [address] [facility] [id number] [phone]    Go on 9/24/2024  Video visit scheduled for 1:00pm. Check your My Chart account for details.    Appointments for Next 30 Days 9/20/2024 - 10/20/2024        Date Arrival Time Visit Type Length Department Provider     9/23/2024  9:00 AM  CHEMOTHERAPY [2076] 60 min [facility] in [place] PF TX RN1    Patient Instructions:         Location Instructions:     Your appointment is scheduled at the [facility] in [place]. The [place] is [address]  [address]. Please park in the GREEN LOT and enter through the CANCER CENTER ENTRANCE of BUILDING A.. Once you arrive, please register at the [facility]  on the 1st floor.  Masks are optional for all patients and visitors, unless otherwise indicated. No care partners/visitors under 18 years of age are allowed in the infusion room.               9/23/2024  9:30 AM  ON TREATMENT VISIT FOLLOW-UP [7671] 15 min [facility] in [place] Senia Foster MD    Patient Instructions:         Location Instructions:     **IF YOU NEED LABWORK OR AN INFUSION ALONG WITH YOUR APPOINTMENT, YOU MUST CALL TO SCHEDULE.**  Your appointment is scheduled at the [facility] in [place]. The address is [address] [place] [address]. Please park in the GREEN LOT and enter through the CANCER CENTER ENTRANCE of BUILDING A. Once you arrive, please register at the [facility]  on the 1st floor.  Masks are optional for all patients and visitors, unless otherwise indicated.               9/24/2024  1:00 PM  [facility] OP VIDEO VISIT [0401] 30 min [facility] [place] Lexy Stewart APRN    Patient Instructions:     Please verify your telehealth insurance benefits prior to your appointment.    You must be in the [facility] during the virtual visit.     Please use the curated[employer] [facility] and launch the video visit 10 minutes prior to your scheduled appointment time to ensure your camera and microphone are working properly. Once the video visit has started you will be placed in a waiting room until the provider begins the visit.     You will receive an email confirmation with instructions.  If you have questions, call your doctor's office directly.    If you are having issues or need to use a desktop/laptop, please follow the below steps:        1.       Close out all other open apps (could be competing for audio resources)  2.       Disable Bluetooth  3.        [employer] mobile device before joining the video  4.       Come off Wi-Fi and switch over to Data    Please see our Video Visit Tip Sheet if you need additional assistance.     If you believe this is an emergency, please dial 911 immediately.          Location Instructions:     **IF YOU NEED LABWORK OR AN INFUSION ALONG WITH YOUR APPOINTMENT, YOU MUST CALL TO SCHEDULE.**  Your appointment is scheduled at the [facility] in [place]. The address is [address] [place] [address]. Please park in the GREEN LOT and enter through the CANCER CENTER ENTRANCE of BUILDING A. Once you arrive, please register at the [facility]  on the 1st floor.  Masks are optional for all patients and visitors, unless otherwise indicated.                      Vital signs:  Temp:  [97.7 °F (36.5 °C)-98.5 °F (36.9 °C)] 97.7 °F (36.5 °C)  Pulse:  [56-74] 74  Resp:  [16-18] 16  BP: (134-145)/(76-92) 145/92  SpO2:  [96 %-99 %] 99 %    Physical Exam:    General: No acute distress   Lungs: clear to auscultation  Cardiovascular: S1, S2  Abdomen: Soft    -----------------------------------------------------------------------------------------------  PATIENT DISCHARGE INSTRUCTIONS: See electronic chart    Quinton Concepcion DO    Total time spent on discharge plannin minutes     The  [employer] Act makes medical notes like these available to patients in the interest of transparency. Please be advised this is a medical document. Medical documents are intended to carry relevant information, facts as evident, and the clinical opinion of the practitioner. The medical note is intended as peer to peer communication and may appear blunt or direct. It is written in medical language and may contain abbreviations or verbiage that are unfamiliar.       Electronically signed by Quinton Concepcion DO on 2024  4:47 PM         REVIEWER COMMENTS

## 2024-09-23 NOTE — PROGRESS NOTES
SW met with pt to provide support and encouragement. Pt shared feelings and thoughts on recent hospitalization.     Support given as pt shared cancer dx and ways that it has impacted him.    MARIZA faxed completed NETO to forms department.    MARIZA sent [employer] message to pt via request for [employer].    SHARON LittleW   at [address], [place] [address]  Ph: [phone] [email or web address]  Fax: [phone]

## 2024-09-23 NOTE — PROGRESS NOTES
Pt here for C1 D1 Drug(s): Taxol.  Arrives Ambulating independently, accompanied by Self     Patient was evaluated today by MD and Treatment Nurse.    Oral medications included in this regimen:  no    Patient confirms comprehension of cancer treatment schedule:  yes    Pregnancy screening:  Not applicable    Modifications in dose or schedule:  No    Medications appearance and physical integrity checked by RN: yes.    Chemotherapy IV pump settings verified by 2 RNs:  Yes.  Frequency of blood return and site check throughout administration: Prior to administration and At completion of therapy     Infusion/treatment outcome:  patient tolerated treatment without incident    Education Record    Learner:  Patient  Barriers / Limitations:  None  Method:  Discussion  Education / instructions given:  Reviewed plan of care and follow up appts.  Outcome:  Shows understanding    Discharged Home, Ambulating independently, accompanied by:Self    Patient/family verbalized understanding of future appointments: by MyChart messaging

## 2024-09-24 ENCOUNTER — TELEMEDICINE (OUTPATIENT)
Dept: HEMATOLOGY/ONCOLOGY | Age: 54
End: 2024-09-24
Attending: INTERNAL MEDICINE
Payer: COMMERCIAL

## 2024-09-24 ENCOUNTER — TELEPHONE (OUTPATIENT)
Dept: HEMATOLOGY/ONCOLOGY | Facility: HOSPITAL | Age: 54
End: 2024-09-24

## 2024-09-24 DIAGNOSIS — C25.9 MALIGNANT NEOPLASM OF PANCREAS, UNSPECIFIED LOCATION OF MALIGNANCY (HCC): ICD-10-CM

## 2024-09-24 DIAGNOSIS — R22.42 LOCALIZED SWELLING OF LEFT LOWER EXTREMITY: ICD-10-CM

## 2024-09-24 DIAGNOSIS — Z51.5 PALLIATIVE CARE BY SPECIALIST: ICD-10-CM

## 2024-09-24 DIAGNOSIS — C15.5 MALIGNANT NEOPLASM OF LOWER THIRD OF ESOPHAGUS (HCC): ICD-10-CM

## 2024-09-24 DIAGNOSIS — G89.3 NEOPLASM RELATED PAIN: Primary | ICD-10-CM

## 2024-09-24 DIAGNOSIS — Z71.89 GOALS OF CARE, COUNSELING/DISCUSSION: ICD-10-CM

## 2024-09-24 PROCEDURE — 99215 OFFICE O/P EST HI 40 MIN: CPT | Performed by: NURSE PRACTITIONER

## 2024-09-24 RX ORDER — POTASSIUM CHLORIDE 1500 MG/1
1 TABLET, EXTENDED RELEASE ORAL DAILY
Qty: 30 TABLET | Refills: 1 | Status: ON HOLD | OUTPATIENT
Start: 2024-09-24

## 2024-09-24 NOTE — PROGRESS NOTES
Palliative Care Follow Up Note     Patient Name: Dontae Cortez Jr.   YOB: 1970   Medical Record Number: [id number]   CSN: [id number]   Date of visit: 9/24/2024     Chief Complaint/Reason for Visit:  Chief Complaint   Patient presents with    Pain     Cancer pain      History of Present Illness:         Dontae Cortez Jr. is a 53 year old male with history of metastatic esophageal and pancreatic cancer who was seen by audio/visual televist for hospital f/u & pain f/u. He was seen via video visit, it is difficult for him to travel long distances multiple days in a row, and received chemo 9/23/24.    Recent hospitalization at [facility] on 9/15/2024 for jaundice, elevated LFTs and CT with progression of disease in the liver and pancreatic mass. He underwent ERCP with biliary sphincterotomy and CBD and PD stent placement 9/17.     He c/o R rib, mid & upper back pain & R shoulder pain. He has a neuropathic component to his pain. He did not tolerate gabapentin in the past (dizziness), and has been on lyrica- dose increased 9/18/24 while inpatient, and he is tolerating Lyrica 25mg bid. Non-pharmacologic pain options have been tried without benefit.    His pain was managed inpatient with IV dilaudid, and he was transitioned to MSER 30mg bid & Oxy IR 10mg, he reports his pain has been manageable since hospital DC on 9/20/24.    He takes MSER 30mg bid- aroud 7A & 7P, he has been sleeping through the night ~8-9 hours which is great for him.     He saw Dr. Foster on 9/23 & chemo resumed 9/23/24.    Today, Ronen feels well, he was seen in his car via video visit, while parked in the [employer] store parking lot, he reports he was going to fix his shower head, and that he was feeling well. He would like to stay the course with current symptom management.    Chart reviewed.  Problem List:  Patient Active Problem List   Diagnosis    Primary hypertension    Hyperlipidemia with target low density lipoprotein (LDL)  cholesterol less than 70 mg/dL    Coronary artery disease involving coronary bypass graft of native heart with angina pectoris (HCC)    S/P CABG x 4    Nontoxic multinodular goiter    Pulmonary nodules    Thrombophlebitis leg    BRANDAN (generalized anxiety disorder)    Right shoulder Arthroscopy acromioplasty ,distal  clavicle resection, rotator cuff/labral debridment  Global 05/13/2021    Right bicipital tenosynovitis    Malignant neoplasm of esophagus (HCC)    Pleural effusion    Esophageal anastomotic leak    Esophageal obstruction    On total parenteral nutrition (TPN)    Mechanical complication of esophagostomy (HCC)    Abdominal pain of unknown etiology    Migration of esophageal stent    Gastroparesis    Esophageal carcinoma (HCC)    Normocytic anemia    Esophageal fistula    Subacute cough    Acquired bronchoesophageal fistula (HCC)    Pneumonia of both lower lobes due to infectious organism    Malignant neoplasm of pancreas (HCC)    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 difficile colitis    Dysphagia, unspecified type    Dyspnea and respiratory abnormalities    Hypokalemia    Dysphagia    Narcotic drug use    History of esophageal cancer    Palliative care by specialist    Neoplasm related pain    Endobronchial mass    Hyponatremia    Thrombocytopenia (HCC)    Acute kidney injury (HCC)    Hyperglycemia    Aspiration pneumonitis (HCC)    C. difficile diarrhea    Aspiration pneumonia (HCC)    Malignant neoplasm metastatic to liver (HCC)    Hyperlipidemia    Nausea vomiting and diarrhea    Fistula, bronchoesophageal (HCC)    Iron deficiency anemia, unspecified iron deficiency anemia type    Azotemia     Palliative care encounter    Goals of care, counseling/discussion    Cancer related pain    Anemia    Fistula    Acute cough    Pneumonia of right lung due to infectious organism, unspecified part of lung    Bacteremia    Acute postoperative pain    Exocrine pancreatic insufficiency (HCC)    Hypertriglyceridemia    Acute pulmonary embolism without acute cor pulmonale, unspecified pulmonary embolism type (HCC)    Acute deep vein thrombosis (DVT) of popliteal vein of right lower extremity (HCC)    Altered mental status, unspecified altered mental status type    Transaminitis    Biliary obstruction (HCC)    Hyperbilirubinemia    Esophageal adenocarcinoma (HCC)    Cancer associated pain      Medical History:  Past Medical History:    Back problem    Belching    Black stools    Borderline diabetes    Dx in 8/2013 - HgA1C 6.2%    C. difficile diarrhea    pt treated and without symptoms    Chest pain    Coronary artery disease    On 8/16/13: CABG x 4 with LIMA to LAD and SVG to diagonal, OM and PDA    Decorative tattoo    Depression    Difficult intubation    h/o esophagectomy for CA; developed esophagobronchial vistula    Disorder of liver    LIVER CA    Esophageal cancer (HCC)    completed chemo    Esophageal reflux    Essential hypertension    Exposure to medical diagnostic radiation    last tx 8/18/2022    Frequent urination    Gastroparesis    Heartburn    High blood pressure    High cholesterol    Found when I had quadruple bypass    History of COVID-19    asymptomatic - pt was dx during a hospitalization for another diagnosis. No continued symptoms    History of stomach ulcers    Hyperlipidemia    Hyperlipidemia LDL goal < 70    Indigestion    Morbid obesity with BMI of 40.0-44.9, adult (HCC)    Muscle weakness    Nausea vomiting and diarrhea    Nontoxic multinodular goiter    Dx in 8/2013: pt was told that imaging showed thyroid cysts per PCP    Pancreatic cancer (HCC)    last dose 12/4/2023 is scheduled for  another round 23    Peripheral vascular disease (HCC)    pt denies    Personal history of antineoplastic chemotherapy    for esophageal cancer/completed    Personal history of antineoplastic chemotherapy    pancreatic cancer    Personal history of antineoplastic chemotherapy    Last treatment 3/12/24    Problems with swallowing    Pulmonary nodules    Dx in 2013: CT chest showed small bilateral fissural-based lung nodules less than 1 cm    S/P CABG x 4    On 13: CABG x 4 with LIMA to LAD and SVG to diagonal, OM and PDA    Shortness of breath    when coughing; no oxygen    Vomiting     Surgical History:  Past Surgical History:   Procedure Laterality Date    Appendectomy      Appendectomy      Arthroscopy of joint unlisted      right shoulder    Cabg      On 13: CABG x 4 with LIMA to LAD and SVG to diagonal, OM and PDA    Cardiac cath lab      On 2013: cardiac cath showed 3-vessel disease    Other surgical history      1.       Laparoscopic robotic-assisted esophagogastrectomy.    Port, indwelling, imp         Allergies:  Allergies   Allergen Reactions    Oxaliplatin HIVES     Shaking        Family History:  Family History   Problem Relation Age of Onset    Cancer Mother         breast and colon     Diabetes Neg      Social History:  Social History     Socioeconomic History    Marital status:     Number of children: 3   Occupational History    Occupation: works as  - on workman's comp   Tobacco Use    Smoking status: Former     Current packs/day: 0.00     Average packs/day: 1 pack/day for 27.0 years (27.0 ttl pk-yrs)     Types: Cigarettes     Start date: 8/15/1986     Quit date: 8/15/2013     Years since quittin.1    Smokeless tobacco: Never    Tobacco comments:     Quit smoking 2013   Vaping Use    Vaping status: Never Used   Substance and Sexual Activity    Alcohol use: Never    Drug use: Never    Sexual activity: Not Currently     Partners: Female      Jewish/Cultural Information:   Current living situation: Patient lives independently with wife & stepchildren.    Medications:  Current Outpatient Medications   Medication Sig Dispense Refill    Potassium Chloride ER 20 MEQ Oral Tab CR Take 1 tablet by mouth daily. 30 tablet 1    baclofen 10 MG Oral Tab Take 1 tablet (10 mg total) by mouth 3 (three) times daily. 30 tablet 0    morphINE ER 30 MG Oral Tab CR Take 1 tablet (30 mg total) by mouth every 12 (twelve) hours. 60 tablet 0    oxyCODONE 10 MG Oral Tab Take 1 tablet (10 mg total) by mouth every 4 (four) hours as needed (for cancer related pain). 180 tablet 0    pregabalin 25 MG Oral Cap Take 1 capsule (25 mg total) by mouth 2 (two) times daily. 60 capsule 0    sertraline 100 MG Oral Tab Take 2 tablets (200 mg total) by mouth daily.      losartan 100 MG Oral Tab Take 1 tablet (100 mg total) by mouth daily. 30 tablet 0    OLANZapine 5 MG Oral Tab Take 1 tablet (5 mg total) by mouth nightly.      prochlorperazine (COMPAZINE) 10 mg tablet Take 1 tablet (10 mg total) by mouth every 6 (six) hours as needed for Nausea.      pantoprazole 40 MG Oral Tab EC Take 1 tablet (40 mg total) by mouth before breakfast. 30 tablet 0    metoprolol tartrate 25 MG Oral Tab Take 1 tablet (25 mg total) by mouth 2x Daily(Beta Blocker). 60 tablet 1    ondansetron 8 MG Oral Tablet Dispersible Take 1 tablet (8 mg total) by mouth every 8 (eight) hours as needed for Nausea. 30 tablet 0    dicyclomine 10 MG Oral Cap Take 1 capsule (10 mg total) by mouth 3 (three) times daily as needed. 90 capsule 1    furosemide 20 MG Oral Tab Take 1 tablet (20 mg total) by mouth daily. 30 tablet 1    apixaban 5 MG Oral Tab Take 1 tablet (5 mg total) by mouth 2 (two) times daily. 60 tablet 2       Review of Systems:  General:  Fatigue- mild.  Feels well.    Respiratory:  Denies SOB, denies cough  Cardiac:  Denies chest pain, heart palpitations  Abdomen:  Denies constipation, diarrhea. Pain controlled on  current opioids.   Psych:  No complaints.  Sleeping well    Palliative Performance Scale:  % 90    Physical Examination:  Limited due to video visit.  General: Patient is alert and oriented, not in acute distress.  Respiratory: normal effort, no distress visualized.  Musculoskeletal: Normal gait. Walks without assistive device.  Psych:  Mood/Affect appropriate    Labs:      Palliative Care Goals of Care:  Discussed with Patient and Family: Treatment focused  Code status: FULL CODE  Have advanced directives been discussed with patient or healthcare power of : Yes    Palliative disposition: Outpatient Palliative Care      Palliative Care Discussion:  Ronen would like to stay the course with current pain management/ symptom management. He is hopeful to continue chemo. He reports he discussed with  his limited treatment options yesterday & he admits to feeling scared, but remains hopeful.  He reports his goal is to return to work at [employer],    He is tolerating current pain regimen, as below. He has a positive mood, and is trying to remain as active as he can.     Impression/Plan:   1. Neoplasm related pain  MSER 30mg bid (7a & 7p)  Oxy IR 10mg q4 PRN, taking while awake.  Lyrica 25mg bid- tolerating, increased 9/18.  Baclofen 10mg tid (muscle spasms)    2. Malignant neoplasm of lower third of esophagus (HCC)  Per Dr. Foster    3. Malignant neoplasm of pancreas, unspecified location of malignancy (HCC)  Per Dr. Foster    4. Goals of care, counseling/discussion  Ronen reports he remains treatment focused, he is hopeful current chemo will give him some more time.     5. Palliative care by specialist      Planned Follow up: 1mo      A total of 40 minutes were spent on this follow up, which included all of the following: chart review, direct face to face contact, history taking, physical examination, counseling and coordinating care, and documentation.       Electronically Signed by:  ARTEMIO Seymour  Outpatient Palliative Nurse Practitioner    The 21st Century Cures Act makes medical notes like these available to patients in the interest of transparency. Please be advised this is a medical document. Medical documents are intended to carry relevant information, facts as evident, and the clinical opinion of the practitioner. The medical note is intended as peer to peer communication and may appear blunt or direct. It is written in medical language and may contain abbreviations or verbiage that are unfamiliar.

## 2024-09-24 NOTE — TELEPHONE ENCOUNTER
Toxicities: C1 D1 Paclitaxel on 9/23/2024    Ronen reports that he feels \"good.\" No side effects at this time. I encouraged him to please call the office if he is not feeling well or he has any questions or concerns. He agreed and thanked me for checking on him.

## 2024-09-29 DIAGNOSIS — G89.3 NEOPLASM RELATED PAIN: ICD-10-CM

## 2024-09-29 DIAGNOSIS — C78.7 MALIGNANT NEOPLASM METASTATIC TO LIVER (HCC): ICD-10-CM

## 2024-09-29 DIAGNOSIS — F33.1 MAJOR DEPRESSIVE DISORDER, RECURRENT, MODERATE (HCC): ICD-10-CM

## 2024-09-29 DIAGNOSIS — G89.3 CANCER ASSOCIATED PAIN: ICD-10-CM

## 2024-09-30 ENCOUNTER — APPOINTMENT (OUTPATIENT)
Dept: CT IMAGING | Age: 54
End: 2024-09-30
Attending: EMERGENCY MEDICINE
Payer: COMMERCIAL

## 2024-09-30 ENCOUNTER — HOSPITAL ENCOUNTER (INPATIENT)
Facility: HOSPITAL | Age: 54
LOS: 12 days | Discharge: HOSPICE | End: 2024-10-12
Attending: EMERGENCY MEDICINE | Admitting: HOSPITALIST
Payer: COMMERCIAL

## 2024-09-30 ENCOUNTER — HOSPITAL ENCOUNTER (INPATIENT)
Facility: HOSPITAL | Age: 54
LOS: 12 days | Discharge: HOME | End: 2024-10-12
Attending: EMERGENCY MEDICINE | Admitting: HOSPITALIST
Payer: COMMERCIAL

## 2024-09-30 ENCOUNTER — APPOINTMENT (OUTPATIENT)
Dept: HEMATOLOGY/ONCOLOGY | Age: 54
End: 2024-09-30
Attending: INTERNAL MEDICINE
Payer: COMMERCIAL

## 2024-09-30 DIAGNOSIS — R10.9 ABDOMINAL PAIN, ACUTE: Primary | ICD-10-CM

## 2024-09-30 DIAGNOSIS — C79.9 METASTATIC ADENOCARCINOMA (HCC): ICD-10-CM

## 2024-09-30 DIAGNOSIS — E87.6 HYPOKALEMIA: ICD-10-CM

## 2024-09-30 PROBLEM — R60.0 LOWER EXTREMITY EDEMA: Status: ACTIVE | Noted: 2024-09-30

## 2024-09-30 PROBLEM — R93.89 ABNORMAL CT OF THE CHEST: Status: ACTIVE | Noted: 2024-09-30

## 2024-09-30 PROBLEM — R60.0 LOWER EXTREMITY EDEMA: Status: ACTIVE | Noted: 2024-01-01

## 2024-09-30 PROBLEM — R93.89 ABNORMAL CT OF THE CHEST: Status: ACTIVE | Noted: 2024-01-01

## 2024-09-30 LAB
ALBUMIN SERPL-MCNC: 1.9 G/DL (ref 3.4–5)
ALBUMIN/GLOB SERPL: 0.5 {RATIO} (ref 1–2)
ALP LIVER SERPL-CCNC: 172 U/L
ALT SERPL-CCNC: 37 U/L
ANION GAP SERPL CALC-SCNC: 7 MMOL/L (ref 0–18)
AST SERPL-CCNC: 27 U/L (ref 15–37)
ATRIAL RATE: 65 BPM
BASOPHILS # BLD AUTO: 0.03 X10(3) UL (ref 0–0.2)
BASOPHILS NFR BLD AUTO: 0.3 %
BILIRUB SERPL-MCNC: 1.3 MG/DL (ref 0.1–2)
BUN BLD-MCNC: 13 MG/DL (ref 9–23)
CALCIUM BLD-MCNC: 8.1 MG/DL (ref 8.5–10.1)
CHLORIDE SERPL-SCNC: 101 MMOL/L (ref 98–112)
CO2 SERPL-SCNC: 27 MMOL/L (ref 21–32)
CREAT BLD-MCNC: 0.56 MG/DL
CRP SERPL-MCNC: 22.4 MG/DL (ref ?–0.3)
EGFRCR SERPLBLD CKD-EPI 2021: 117 ML/MIN/1.73M2 (ref 60–?)
EOSINOPHIL # BLD AUTO: 0 X10(3) UL (ref 0–0.7)
EOSINOPHIL NFR BLD AUTO: 0 %
ERYTHROCYTE [DISTWIDTH] IN BLOOD BY AUTOMATED COUNT: 14.3 %
ERYTHROCYTE [SEDIMENTATION RATE] IN BLOOD: 55 MM/HR
GLOBULIN PLAS-MCNC: 3.7 G/DL (ref 2.8–4.4)
GLUCOSE BLD-MCNC: 131 MG/DL (ref 70–99)
HCT VFR BLD AUTO: 29.9 %
HGB BLD-MCNC: 9.9 G/DL
IMM GRANULOCYTES # BLD AUTO: 0.18 X10(3) UL (ref 0–1)
IMM GRANULOCYTES NFR BLD: 1.5 %
LACTATE SERPL-SCNC: 0.8 MMOL/L (ref 0.4–2)
LIPASE SERPL-CCNC: 13 U/L (ref 12–53)
LYMPHOCYTES # BLD AUTO: 0.29 X10(3) UL (ref 1–4)
LYMPHOCYTES NFR BLD AUTO: 2.4 %
MCH RBC QN AUTO: 31 PG (ref 26–34)
MCHC RBC AUTO-ENTMCNC: 33.1 G/DL (ref 31–37)
MCV RBC AUTO: 93.7 FL
MONOCYTES # BLD AUTO: 0.65 X10(3) UL (ref 0.1–1)
MONOCYTES NFR BLD AUTO: 5.5 %
NEUTROPHILS # BLD AUTO: 10.76 X10 (3) UL (ref 1.5–7.7)
NEUTROPHILS # BLD AUTO: 10.76 X10(3) UL (ref 1.5–7.7)
NEUTROPHILS NFR BLD AUTO: 90.3 %
OSMOLALITY SERPL CALC.SUM OF ELEC: 282 MOSM/KG (ref 275–295)
P AXIS: 0 DEGREES
P-R INTERVAL: 132 MS
PLATELET # BLD AUTO: 259 10(3)UL (ref 150–450)
POTASSIUM SERPL-SCNC: 2.6 MMOL/L (ref 3.5–5.1)
POTASSIUM SERPL-SCNC: 3.3 MMOL/L (ref 3.5–5.1)
PROT SERPL-MCNC: 5.6 G/DL (ref 6.4–8.2)
Q-T INTERVAL: 408 MS
QRS DURATION: 98 MS
QTC CALCULATION (BEZET): 424 MS
R AXIS: -10 DEGREES
RBC # BLD AUTO: 3.19 X10(6)UL
SODIUM SERPL-SCNC: 135 MMOL/L (ref 136–145)
T AXIS: 101 DEGREES
VENTRICULAR RATE: 65 BPM
WBC # BLD AUTO: 11.9 X10(3) UL (ref 4–11)

## 2024-09-30 PROCEDURE — 99223 1ST HOSP IP/OBS HIGH 75: CPT | Performed by: HOSPITALIST

## 2024-09-30 PROCEDURE — 99253 IP/OBS CNSLTJ NEW/EST LOW 45: CPT | Performed by: CLINICAL NURSE SPECIALIST

## 2024-09-30 PROCEDURE — 71275 CT ANGIOGRAPHY CHEST: CPT | Performed by: EMERGENCY MEDICINE

## 2024-09-30 PROCEDURE — 99255 IP/OBS CONSLTJ NEW/EST HI 80: CPT | Performed by: INTERNAL MEDICINE

## 2024-09-30 PROCEDURE — 99255 IP/OBS CONSLTJ NEW/EST HI 80: CPT | Performed by: NURSE PRACTITIONER

## 2024-09-30 PROCEDURE — 74177 CT ABD & PELVIS W/CONTRAST: CPT | Performed by: EMERGENCY MEDICINE

## 2024-09-30 RX ORDER — SERTRALINE HYDROCHLORIDE 100 MG/1
100 TABLET, FILM COATED ORAL DAILY
Qty: 30 TABLET | Refills: 0 | Status: SHIPPED | OUTPATIENT
Start: 2024-09-30

## 2024-09-30 RX ORDER — SERTRALINE HYDROCHLORIDE 100 MG/1
200 TABLET, FILM COATED ORAL DAILY
Status: DISCONTINUED | OUTPATIENT
Start: 2024-09-30 | End: 2024-10-12

## 2024-09-30 RX ORDER — MELATONIN
3 NIGHTLY PRN
Status: DISCONTINUED | OUTPATIENT
Start: 2024-09-30 | End: 2024-10-12

## 2024-09-30 RX ORDER — DICYCLOMINE HYDROCHLORIDE 10 MG/1
10 CAPSULE ORAL
Status: DISCONTINUED | OUTPATIENT
Start: 2024-09-30 | End: 2024-10-01

## 2024-09-30 RX ORDER — FUROSEMIDE 20 MG/1
20 TABLET ORAL DAILY
Status: DISCONTINUED | OUTPATIENT
Start: 2024-09-30 | End: 2024-10-01

## 2024-09-30 RX ORDER — MORPHINE SULFATE 30 MG/1
30 TABLET, FILM COATED, EXTENDED RELEASE ORAL EVERY 12 HOURS SCHEDULED
Status: DISCONTINUED | OUTPATIENT
Start: 2024-09-30 | End: 2024-10-01

## 2024-09-30 RX ORDER — PREGABALIN 25 MG/1
25 CAPSULE ORAL 2 TIMES DAILY
Status: DISCONTINUED | OUTPATIENT
Start: 2024-09-30 | End: 2024-10-12

## 2024-09-30 RX ORDER — ACETAMINOPHEN 500 MG
500 TABLET ORAL EVERY 4 HOURS PRN
Status: DISCONTINUED | OUTPATIENT
Start: 2024-09-30 | End: 2024-10-12

## 2024-09-30 RX ORDER — HYDROMORPHONE HYDROCHLORIDE 1 MG/ML
0.5 INJECTION, SOLUTION INTRAMUSCULAR; INTRAVENOUS; SUBCUTANEOUS EVERY 30 MIN PRN
Status: DISCONTINUED | OUTPATIENT
Start: 2024-09-30 | End: 2024-09-30 | Stop reason: ALTCHOICE

## 2024-09-30 RX ORDER — BACLOFEN 10 MG/1
10 TABLET ORAL 3 TIMES DAILY
Status: DISCONTINUED | OUTPATIENT
Start: 2024-09-30 | End: 2024-10-02

## 2024-09-30 RX ORDER — DICYCLOMINE HYDROCHLORIDE 10 MG/1
10 CAPSULE ORAL 4 TIMES DAILY
Qty: 90 CAPSULE | Refills: 1 | Status: SHIPPED | OUTPATIENT
Start: 2024-09-30

## 2024-09-30 RX ORDER — POTASSIUM CHLORIDE 1500 MG/1
40 TABLET, EXTENDED RELEASE ORAL EVERY 4 HOURS
Status: COMPLETED | OUTPATIENT
Start: 2024-09-30 | End: 2024-09-30

## 2024-09-30 RX ORDER — METOPROLOL TARTRATE 25 MG/1
25 TABLET, FILM COATED ORAL
Status: DISCONTINUED | OUTPATIENT
Start: 2024-09-30 | End: 2024-10-01

## 2024-09-30 RX ORDER — HYDROMORPHONE HYDROCHLORIDE 1 MG/ML
0.4 INJECTION, SOLUTION INTRAMUSCULAR; INTRAVENOUS; SUBCUTANEOUS EVERY 2 HOUR PRN
Status: DISCONTINUED | OUTPATIENT
Start: 2024-09-30 | End: 2024-10-10

## 2024-09-30 RX ORDER — LOSARTAN POTASSIUM 100 MG/1
100 TABLET ORAL DAILY
Status: DISCONTINUED | OUTPATIENT
Start: 2024-09-30 | End: 2024-10-01

## 2024-09-30 RX ORDER — ONDANSETRON 2 MG/ML
4 INJECTION INTRAMUSCULAR; INTRAVENOUS ONCE
Status: COMPLETED | OUTPATIENT
Start: 2024-09-30 | End: 2024-09-30

## 2024-09-30 RX ORDER — ONDANSETRON 2 MG/ML
4 INJECTION INTRAMUSCULAR; INTRAVENOUS EVERY 6 HOURS PRN
Status: DISCONTINUED | OUTPATIENT
Start: 2024-09-30 | End: 2024-10-12

## 2024-09-30 RX ORDER — PROCHLORPERAZINE EDISYLATE 5 MG/ML
5 INJECTION INTRAMUSCULAR; INTRAVENOUS EVERY 8 HOURS PRN
Status: DISCONTINUED | OUTPATIENT
Start: 2024-09-30 | End: 2024-10-12

## 2024-09-30 RX ORDER — ECHINACEA PURPUREA EXTRACT 125 MG
1 TABLET ORAL
Status: DISCONTINUED | OUTPATIENT
Start: 2024-09-30 | End: 2024-10-12

## 2024-09-30 RX ORDER — HYDROMORPHONE HYDROCHLORIDE 1 MG/ML
0.5 INJECTION, SOLUTION INTRAMUSCULAR; INTRAVENOUS; SUBCUTANEOUS ONCE
Status: COMPLETED | OUTPATIENT
Start: 2024-09-30 | End: 2024-09-30

## 2024-09-30 RX ORDER — HYDROMORPHONE HYDROCHLORIDE 1 MG/ML
0.8 INJECTION, SOLUTION INTRAMUSCULAR; INTRAVENOUS; SUBCUTANEOUS EVERY 2 HOUR PRN
Status: DISCONTINUED | OUTPATIENT
Start: 2024-09-30 | End: 2024-10-04

## 2024-09-30 RX ORDER — OXYCODONE HYDROCHLORIDE 5 MG/1
10 TABLET ORAL EVERY 4 HOURS PRN
Status: DISCONTINUED | OUTPATIENT
Start: 2024-09-30 | End: 2024-10-01

## 2024-09-30 RX ORDER — POTASSIUM CHLORIDE 1.5 G/1.58G
20 POWDER, FOR SOLUTION ORAL ONCE
Status: DISCONTINUED | OUTPATIENT
Start: 2024-09-30 | End: 2024-09-30

## 2024-09-30 RX ORDER — POTASSIUM CHLORIDE 14.9 MG/ML
20 INJECTION INTRAVENOUS ONCE
Status: COMPLETED | OUTPATIENT
Start: 2024-09-30 | End: 2024-09-30

## 2024-09-30 RX ORDER — OLANZAPINE 2.5 MG/1
5 TABLET, FILM COATED ORAL NIGHTLY
Status: DISCONTINUED | OUTPATIENT
Start: 2024-09-30 | End: 2024-10-12

## 2024-09-30 RX ORDER — PREGABALIN 25 MG/1
25 CAPSULE ORAL NIGHTLY
Qty: 30 CAPSULE | Refills: 1 | OUTPATIENT
Start: 2024-09-30

## 2024-09-30 RX ORDER — PANTOPRAZOLE SODIUM 40 MG/1
40 TABLET, DELAYED RELEASE ORAL
Status: DISCONTINUED | OUTPATIENT
Start: 2024-09-30 | End: 2024-10-12

## 2024-09-30 RX ORDER — GABAPENTIN 100 MG/1
100 CAPSULE ORAL NIGHTLY
Qty: 30 CAPSULE | Refills: 0 | OUTPATIENT
Start: 2024-09-30

## 2024-09-30 RX ORDER — HYDROMORPHONE HYDROCHLORIDE 1 MG/ML
0.2 INJECTION, SOLUTION INTRAMUSCULAR; INTRAVENOUS; SUBCUTANEOUS EVERY 2 HOUR PRN
Status: DISCONTINUED | OUTPATIENT
Start: 2024-09-30 | End: 2024-10-04

## 2024-09-30 NOTE — PLAN OF CARE
NURSING ADMISSION NOTE      Patient admitted via Ambulance  Oriented to room.  Safety precautions initiated.  Bed in low position.  Call light in reach.    Patient is alert, c/o severe generalized pain - PRN per MAR. BLE edema. Hem/onc team member at bedside at time of transfer. Pulmonology and GI consults paged. K replaced per protocol. Patient reports poor appetite - encouraged PO intake as tolerated. Urinal supplied to patient for accurate output. Call light within reach.

## 2024-09-30 NOTE — CONSULTS
Hem/Onc Report of Consultation    Patient Name: Dontae Cortez Jr.   YOB: 1970   Medical Record Number: [id number]   CSN: [id number]   Consulting Physician: Dr. Senia Foster  Referring Provider(s): Dr. Quin Harley  Date of Consultation: 9/30/2024     The 21st Century Cures Act makes medical notes like these available to patients in the interest of transparency. Please be advised this is a medical document. Medical documents are intended to carry relevant information, facts as evident, and the clinical opinion of the practitioner. The medical note is intended as peer to peer communication and may appear blunt or direct. It is written in medical language and may contain abbreviations or verbiage that are unfamiliar.     Reason for Consultation: Metastatic Esophageal Cancer    Chief Complaint:   Chief Complaint   Patient presents with    Pain       Oncology/Hematology History:    -2018: Per report had a normal EGD and colonoscopy     -June 2022: He met with GI due to new onset dysphagia which started about 1 month prior to his visit.  He had an esophagram with a focal abrupt narrowing with irregular margins in the distal one third of the esophagus extending to the GE junction.  He also had an episode of food impaction. He has also lost about 15 lb. He was a heavy smoker from age 14 to about 40 (1.5 PPD). Also with alcohol use currently. Mother with a history of breast and colon cancer.  EGD and EUS with Dr. Yadav on 6/7/2022: Severe esophagitis with a concerning mass extending 37-39 centimeters from the incisors.  Nonobstructive mass.  By EUS uT3N1 disease.  Pathology showed invasive moderate to poorly differentiated adenocarcinoma.  HER2 amplified by FISH. CT CAP done at [facility] on 6/16/22: Results not loaded to PACs yet.  CA 19-9 from the same day was 107.4.  CEA normal. PET/CT on 6/20/2022: Increased distal esophageal uptake with an SUV of 14.4.  Concern for shreya  metastases.     -Concurrent chemoradiation with carboplatin AUC2 plus paclitaxel 50 mg/m2: July 2022-August 2022 with  down (280-->30). Post treatment PET/CT showed improvement.      -Surgery with Dr. Lu on 9/30/22: ypT1b pN0. Recovery has been complicated by an esophageal leak,      -I met with him on 12/12/22. We discussed adjuvant opdivo for 1 year. His  was elevated to 48 and repeat was 64. CT CAP was recommended.      -He was admitted on 12/25/22 for abdominal pain. He had a POEM procedure done. He was also noted to have a RLL consolidation. He was seen by pulmonary, based on imaging an outpatient PET/CT was recommended and a biopsy of the most FDG avid lesion would be considered. Noted to have CAD and an outpatient evaluation was recommended. Outpatient PET/CT done on 1/4/23 was reviewed in tumor board and there was no focal area of concern and adjuvant immunotherapy recommended.      -Adjuvant opdivo:01/2023-03/2023. PET/CT in in 04/2023 showed uptake in the pancreatic head and tail. Also with some uptake in liver. EGD/EUS on 4/27/23 with Dr. Ritchie: Pancreatic head mass positive for adenocarcinoma: Comparison to previous esophageal cancer shows similarity but IHC in non-specific. Her 2 IHC was 2+ on this specimen but FISH was negative.  Given discordant results on initial HER2 and follow-up HER2 we decided to proceed with chemotherapy with immunotherapy with HER2 directed therapy.     -Chemo + Herceptin + Immunotherapy: He received 7 cycles of FOLFOX + Herceptin + Immunotherapy (05/2023-09/2023). Imaging after C5 showed a favorable response. After C7, we held oxaliplatin due to neuropathy and he was started on maintenance herceptin + IO maintenance. Signatera was negative 09/11/23.     -Herceptin + IO Maintenance: He received 3 cycles from 09/25/23-10/31/23. Treatment was delayed due to hospitalizations.      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 Due to new findings-restarting FOLFOX discussed.      -admitted from 1/8/24-1/13/24 for persistent dysphagia and bronchesophageal stent.      -1/17/24: EGD with fistula closure with ASD occluder and removal of the bronchial stent.     -FOLFOX + Herceptin + Opdivo restarted: 3 cycles: 02/2024-03/2024     -Treatment delayed again for recurrent hospital admissions     -He underwent a Latissimus dorsi flap reconstruction for his bronchoesophageal fistula on 5/1/24     -PET/CT 5/15/24: at least 3 areas of uptake in the right hepatic lobe, uptake in pancrease and retroperitoneum. Post-operative uptake in chest wall.      -FOLFOX + Herceptin + Nivolumab Restarted: 6/11/2024 until 8/14/2024.  He received 5 cycles.  A PET scan after 5 cycles showed overall progression of metastatic disease with new gastrohepatic and peripancreatic lymph nodes along with enlargement of pancreatic and hepatic metastases.  There were also new lung nodules.  A CT-guided liver biopsy on 9/10/2024 was done which was positive for metastatic adenocarcinoma and the immunoprofile is compatible with the patient's esophageal primary. HER negative on FISH.     -He was admitted on 9/15/24 for abdominal pain.  Labs showed elevated AST/ALT and T bili (6.7). CTA CAP showed progression of pancreatic and hepatic masses. There is intra-extra hepatic biliary ductal dilation. Subpleural reticular opacities noted-inflammatory or interstitial process? Hi Res CT recommended. He underwent an ERCP which showed a CBD stricture with biliary sphincterectomy and CBD, PD bile duct stent placement with Dr. Ritchie.     -Cycle 1 Day 1 of Taxol on 9/23/24    History of Present  Illness    Ronen Cortez Jr is a 53 year old male with the above oncology history under the kind care of Dr. Foster for a diagnosis of metastatic pancreatic cancer who presented to the emergency room early this morning with complaint of upper abdominal pain, mostly right-sided with some associated nausea. Work up in the ER found the patient to be hypokalemic, mildly elevated WBCs, and mild anemia. CTA C/A/P findings suggestive on an acute, diffuse inflammatory process, metastatic lesions of the liver and pancreas appear relatively stable. Advised patient admission for IV antibiotics, pain management, and further work up.     Ronen was seen upon arrival to the unit, complains of diffuse abdominal pain on my arrival. He tells me that he has had this pain since he was discharged from the hospital on the 20th but this morning around 1 am was when he noticed worsening with associated difficulty breathing that woke him from his sleep. The pain was unrelieved by his at home pain medications. Denies any fevers, chills, or night sweats; endorses cough and PUTNAM. He tells me that he hasn't been moving his bowels well, but then tells me he had a soft/mushy BM this morning and felt that he experienced total evacuation. Has noticed increasing lower extremity edema since discharge from the hospital    Past Medical History:  Past Medical History:    Back problem    Belching    Black stools    Borderline diabetes    Dx in 8/2013 - HgA1C 6.2%    C. difficile diarrhea    pt treated and without symptoms    Chest pain    Coronary artery disease    On 8/16/13: CABG x 4 with LIMA to LAD and SVG to diagonal, OM and PDA    Decorative tattoo    Depression    Difficult intubation    h/o esophagectomy for CA; developed esophagobronchial vistula    Disorder of liver    LIVER CA    Esophageal cancer (HCC)    completed chemo    Esophageal reflux    Essential hypertension    Exposure to medical diagnostic radiation    last tx 8/18/2022    Frequent  urination    Gastroparesis    Heartburn    High blood pressure    High cholesterol    Found when I had quadruple bypass    History of COVID-19    asymptomatic - pt was dx during a hospitalization for another diagnosis. No continued symptoms    History of stomach ulcers    Hyperlipidemia    Hyperlipidemia LDL goal < 70    Indigestion    Morbid obesity with BMI of 40.0-44.9, adult (HCC)    Muscle weakness    Nausea vomiting and diarrhea    Nontoxic multinodular goiter    Dx in 8/2013: pt was told that imaging showed thyroid cysts per PCP    Pancreatic cancer (HCC)    last dose 12/4/2023 is scheduled for another round 12/27/23    Peripheral vascular disease (HCC)    pt denies    Personal history of antineoplastic chemotherapy    for esophageal cancer/completed    Personal history of antineoplastic chemotherapy    pancreatic cancer    Personal history of antineoplastic chemotherapy    Last treatment 3/12/24    Problems with swallowing    Pulmonary nodules    Dx in 8/2013: CT chest showed small bilateral fissural-based lung nodules less than 1 cm    S/P CABG x 4    On 8/16/13: CABG x 4 with LIMA to LAD and SVG to diagonal, OM and PDA    Shortness of breath    when coughing; no oxygen    Vomiting       Past Surgical History:  Past Surgical History:   Procedure Laterality Date    Appendectomy      Appendectomy      Arthroscopy of joint unlisted      right shoulder    Cabg      On 8/16/13: CABG x 4 with LIMA to LAD and SVG to diagonal, OM and PDA    Cardiac cath lab      On 8/14/2013: cardiac cath showed 3-vessel disease    Other surgical history      1.       Laparoscopic robotic-assisted esophagogastrectomy.    Port, indwelling, imp         Family Medical History:  Family History   Problem Relation Age of Onset    Cancer Mother         breast and colon     Diabetes Neg        Psychosocial History:  Social History     Socioeconomic History    Marital status:      Spouse name: Not on file    Number of children: 3     Years of education: Not on file    Highest education level: Not on file   Occupational History    Occupation: works as  - on workman's comp   Tobacco Use    Smoking status: Former     Current packs/day: 0.00     Average packs/day: 1 pack/day for 27.0 years (27.0 ttl pk-yrs)     Types: Cigarettes     Start date: 8/15/1986     Quit date: 8/15/2013     Years since quittin.1     Passive exposure: Never    Smokeless tobacco: Never    Tobacco comments:     Quit smoking    Vaping Use    Vaping status: Never Used   Substance and Sexual Activity    Alcohol use: Never    Drug use: Never    Sexual activity: Not Currently     Partners: Female   Other Topics Concern     Service Not Asked    Blood Transfusions Not Asked    Caffeine Concern Yes    Occupational Exposure Not Asked    Hobby Hazards Not Asked    Sleep Concern Not Asked    Stress Concern No    Weight Concern No    Special Diet No    Back Care Not Asked    Exercise No    Bike Helmet Not Asked    Seat Belt No    Self-Exams Not Asked   Social History Narrative    Lives with life partner    Has 3 daughters - 1 lives closeby, 1 in [place], 1 in [place]     Social Determinants of Health     Financial Resource Strain: Low Risk  (2024)    Received [facility]    Overall Financial Resource Strain (CARDIA)     Difficulty of Paying Living Expenses: Not hard at all   Food Insecurity: No Food Insecurity (9/15/2024)    Food Insecurity     Food Insecurity: Never true   Transportation Needs: No Transportation Needs (9/15/2024)    Transportation Needs     Lack of Transportation: No     Car Seat: Not on file   Physical Activity: Not on file   Stress: Not on file   Social Connections: Not on file   Housing Stability: Low Risk  (9/15/2024)    Housing Stability     Housing Instability: No     Housing Instability Emergency: No     Crib or Bassinette: Not on file       Allergies:   Allergies   Allergen Reactions    Oxaliplatin HIVES      Shaking        Current Medications:   HYDROmorphone (Dilaudid) 1 MG/ML injection 0.5 mg  0.5 mg Intravenous Q30 Min PRN    [COMPLETED] ondansetron (Zofran) 4 MG/2ML injection 4 mg  4 mg Intravenous Once    [COMPLETED] potassium chloride 20 mEq/100mL IVPB premix 20 mEq  20 mEq Intravenous Once    potassium chloride (Klor-Con) 20 MEQ oral powder 20 mEq  20 mEq Oral Once    [COMPLETED] iopamidol 76% (ISOVUE-370) injection for power injector  100 mL Intravenous ONCE PRN    piperacillin-tazobactam (Zosyn) 4.5 g in dextrose 5% 100 mL IVPB-ADDV  4.5 g Intravenous Once       Home Medications:  Current Facility-Administered Medications on File Prior to Encounter   Medication    [COMPLETED] ondansetron (Zofran) 16 mg, dexAMETHasone (Decadron) 20 mg in sodium chloride 0.9% 110 mL IVPB    [COMPLETED] diphenhydrAMINE (Benadryl) 50 mg/mL  injection 25 mg    [COMPLETED] famotidine (Pepcid) 20 mg/2mL injection 20 mg    [COMPLETED] PACLitaxel (Taxol) 156 mg in sodium chloride 0.9% 276 mL infusion    [COMPLETED] magnesium oxide (Mag-Ox) tab 400 mg    [COMPLETED] magnesium oxide (Mag-Ox) tab 400 mg    [COMPLETED] potassium chloride (Klor-Con M20) tab 40 mEq    [COMPLETED] magnesium oxide (Mag-Ox) tab 400 mg    [] indomethacin (Indocin) 100 MG rectal suppository    [COMPLETED] magnesium oxide (Mag-Ox) tab 400 mg    [COMPLETED] HYDROmorphone (Dilaudid) 1 MG/ML injection 0.5 mg    [COMPLETED] sodium chloride 0.9 % IV bolus 500 mL    [COMPLETED] iopamidol 76% (ISOVUE-370) injection for power injector    [COMPLETED] piperacillin-tazobactam (Zosyn) 4.5 g in dextrose 5% 100 mL IVPB-ADDV    [] midazolam (Versed) 2 MG/2ML injection 1 mg    [] fentaNYL (Sublimaze) 50 mcg/mL injection 50 mcg    [COMPLETED] heparin (Porcine) 100 Units/mL lock flush 500 Units     Current Outpatient Medications on File Prior to Encounter   Medication Sig    Potassium Chloride ER 20 MEQ Oral Tab CR Take 1 tablet by mouth daily.    baclofen 10 MG  Oral Tab Take 1 tablet (10 mg total) by mouth 3 (three) times daily.    morphINE ER 30 MG Oral Tab CR Take 1 tablet (30 mg total) by mouth every 12 (twelve) hours.    oxyCODONE 10 MG Oral Tab Take 1 tablet (10 mg total) by mouth every 4 (four) hours as needed (for cancer related pain).    pregabalin 25 MG Oral Cap Take 1 capsule (25 mg total) by mouth 2 (two) times daily.    sertraline 100 MG Oral Tab Take 2 tablets (200 mg total) by mouth daily.    losartan 100 MG Oral Tab Take 1 tablet (100 mg total) by mouth daily.    OLANZapine 5 MG Oral Tab Take 1 tablet (5 mg total) by mouth nightly.    prochlorperazine (COMPAZINE) 10 mg tablet Take 1 tablet (10 mg total) by mouth every 6 (six) hours as needed for Nausea.    pantoprazole 40 MG Oral Tab EC Take 1 tablet (40 mg total) by mouth before breakfast.    metoprolol tartrate 25 MG Oral Tab Take 1 tablet (25 mg total) by mouth 2x Daily(Beta Blocker).    ondansetron 8 MG Oral Tablet Dispersible Take 1 tablet (8 mg total) by mouth every 8 (eight) hours as needed for Nausea.    dicyclomine 10 MG Oral Cap Take 1 capsule (10 mg total) by mouth 3 (three) times daily as needed.    furosemide 20 MG Oral Tab Take 1 tablet (20 mg total) by mouth daily.    apixaban 5 MG Oral Tab Take 1 tablet (5 mg total) by mouth 2 (two) times daily.       Review of Systems:  A comprehensive 14 point review of systems was completed.  Pertinent positives and negatives noted in the the HPI.    Allergies:  Allergies   Allergen Reactions    Oxaliplatin HIVES     Shaking          Vital Signs:  BP (!) 139/92   Pulse 74   Temp 97.7 °F (36.5 °C) (Oral)   Resp 18   Ht 1.88 m (6' 2\")   Wt 73.5 kg (162 lb 0.6 oz)   SpO2 98%   BMI 20.80 kg/m²     Last 3 Weights   09/30/24 0559 73.5 kg (162 lb 0.6 oz)   09/23/24 0905 73.5 kg (162 lb)   09/15/24 1733 70.3 kg (155 lb)   09/15/24 1207 70.3 kg (155 lb)       Physical Examination:  General: Patient is alert and oriented x 3, not in acute distress.  Vital  Signs: BP (!) 139/92   Pulse 74   Temp 97.7 °F (36.5 °C) (Oral)   Resp 18   Ht 1.88 m (6' 2\")   Wt 73.5 kg (162 lb 0.6 oz)   SpO2 98%   BMI 20.80 kg/m²   HEENT: EOMs intact. PERRL. Oropharynx is clear.   Chest: Clear to auscultation anteriorly, respirations non-labored   Heart: Regular rate and rhythm.   Abdomen: Soft, with tenderness to palpation  Extremities: Bilateral lower extremity edema; bruised right great toe (patient uncertain what happened)  Neurological: Grossly intact.   Psych/Depression: mood and affect are appropriate    Labs:  Recent Results (from the past 24 hour(s))   RAINBOW DRAW LAVENDER    Collection Time: 09/30/24  6:37 AM   Result Value Ref Range    Hold Lavender Auto Resulted    RAINBOW DRAW LIGHT GREEN    Collection Time: 09/30/24  6:37 AM   Result Value Ref Range    Hold Lt Green Auto Resulted    RAINBOW DRAW BLUE    Collection Time: 09/30/24  6:37 AM   Result Value Ref Range    Hold Blue Auto Resulted    Comp Metabolic Panel (14)    Collection Time: 09/30/24  6:37 AM   Result Value Ref Range    Glucose 131 (H) 70 - 99 mg/dL    Sodium 135 (L) 136 - 145 mmol/L    Potassium 2.6 (LL) 3.5 - 5.1 mmol/L    Chloride 101 98 - 112 mmol/L    CO2 27.0 21.0 - 32.0 mmol/L    Anion Gap 7 0 - 18 mmol/L    BUN 13 9 - 23 mg/dL    Creatinine 0.56 (L) 0.70 - 1.30 mg/dL    Calcium, Total 8.1 (L) 8.5 - 10.1 mg/dL    Calculated Osmolality 282 275 - 295 mOsm/kg    eGFR-Cr 117 >=60 mL/min/1.73m2    AST 27 15 - 37 U/L    ALT 37 16 - 61 U/L    Alkaline Phosphatase 172 (H) 45 - 117 U/L    Bilirubin, Total 1.3 0.1 - 2.0 mg/dL    Total Protein 5.6 (L) 6.4 - 8.2 g/dL    Albumin 1.9 (L) 3.4 - 5.0 g/dL    Globulin  3.7 2.8 - 4.4 g/dL    A/G Ratio 0.5 (L) 1.0 - 2.0   CBC With Differential With Platelet    Collection Time: 09/30/24  6:37 AM   Result Value Ref Range    WBC 11.9 (H) 4.0 - 11.0 x10(3) uL    RBC 3.19 (L) 4.30 - 5.70 x10(6)uL    HGB 9.9 (L) 13.0 - 17.5 g/dL    HCT 29.9 (L) 39.0 - 53.0 %    .0 150.0  - 450.0 10(3)uL    MCV 93.7 80.0 - 100.0 fL    MCH 31.0 26.0 - 34.0 pg    MCHC 33.1 31.0 - 37.0 g/dL    RDW 14.3 %    Neutrophil Absolute Prelim 10.76 (H) 1.50 - 7.70 x10 (3) uL    Neutrophil Absolute 10.76 (H) 1.50 - 7.70 x10(3) uL    Lymphocyte Absolute 0.29 (L) 1.00 - 4.00 x10(3) uL    Monocyte Absolute 0.65 0.10 - 1.00 x10(3) uL    Eosinophil Absolute 0.00 0.00 - 0.70 x10(3) uL    Basophil Absolute 0.03 0.00 - 0.20 x10(3) uL    Immature Granulocyte Absolute 0.18 0.00 - 1.00 x10(3) uL    Neutrophil % 90.3 %    Lymphocyte % 2.4 %    Monocyte % 5.5 %    Eosinophil % 0.0 %    Basophil % 0.3 %    Immature Granulocyte % 1.5 %   Lipase    Collection Time: 09/30/24  6:37 AM   Result Value Ref Range    Lipase 13 12 - 53 U/L   Scan slide    Collection Time: 09/30/24  6:37 AM   Result Value Ref Range    Slide Review       Slide reviewed, normal WBC, RBC, and platelet morphology.   EKG    Collection Time: 09/30/24  6:55 AM   Result Value Ref Range    Ventricular rate 65 BPM    Atrial rate 65 BPM    P-R Interval 132 ms    QRS Duration 98 ms    Q-T Interval 408 ms    QTC Calculation (Bezet) 424 ms    P Axis 0 degrees    R Axis -10 degrees    T Axis 101 degrees   Lactic Acid, Plasma    Collection Time: 09/30/24  9:26 AM   Result Value Ref Range    Lactic Acid 0.8 0.4 - 2.0 mmol/L     CBC:    Lab Results   Component Value Date    WBC 11.9 (H) 09/30/2024    WBC 8.7 09/23/2024    WBC 6.5 09/18/2024     Lab Results   Component Value Date    HGB 9.9 (L) 09/30/2024    HGB 10.2 (L) 09/23/2024    HGB 10.4 (L) 09/18/2024      Lab Results   Component Value Date    .0 09/30/2024    .0 09/23/2024    .0 09/18/2024       Radiology:    CTA CHEST + CT ABD (W) + CT PEL (W) (CPT=71275/38569)    Result Date: 9/30/2024  CONCLUSION:  There has been interval CBD stent placement.  Additionally, in the interim from the previous exam of 9/15/2024, there has been development of diffuse patchy ground-glass opacities within the lungs  bilaterally, mild to moderate diffuse intra-abdominal ascites with mesenteric and omental stranding diffusely as well as the development of subcutaneous edematous changes/stranding involving the entire visualized subcutaneous tissues of the chest, abdomen, pelvis and upper thighs.  Additionally, there has been interval increased gallbladder distension with mild gallbladder wall thickening and diffuse pericholecystic fluid and fat stranding.  There is also interval development of colonic wall thickening involving the transverse to rectosigmoid colon.  Given the multiple interval changes, the appearance would suggest an acute, diffuse inflammatory process.  The previously identified metastatic lesions of the liver and pancreas appear relatively stable.  No pulmonary emboli noted.    LOCATION:  [facility]   Dictated by (CST): Joseph Mcdaniels DO on 9/30/2024 at 8:10 AM     Finalized by (CST): Joseph Mcdaniels DO on 9/30/2024 at 8:36 AM          Impression/Plan    Abdominal Pain       - 2/2 to acute, diffuse inflammatory process with inflammation noted to colon, mesentary, omentum, gallbladder, and subcutaneous fat; ESR and CRP elevated. Presentation would be unusual for immunotherapy related which patient's last dose of nivolumab was received on 8/14/24. Could trial steroids pending assessment by hospitalist and other specialists        - Antibiotics ordered, await hospitalist assessment        - Consult Palliative care for continuity of care and management recommendations for pain medications. Current outpatient regimen prior to admission from DOS 9/24/24 as follows:    MSER 30mg bid (7a & 7p)  Oxy IR 10mg q4 PRN, taking while awake.  Lyrica 25mg bid- tolerating, increased 9/18.  Baclofen 10mg tid (muscle spasms)    Abnormal CT scan, Worsening PUTNAM and at Rest       - CTA chest portion notes interval development of diffuse, patchy GGO bilaterally        - Zosyn ordered from ER, consult pulmonology now    Metastatic Esophageal  Cancer       - S/p multiple lines of therapy, most recently started on weekly taxol (3 weeks on, 1 week off) d/t progression of disease on FOLFOX+Herceptin+Nivolumab (6/11-8/14/24), only received C1D1 before presenting to ER today.       - No acute therapy during admission, follow up outpatient    History of PE/RLE DVT       - Apixaban indefinitely    BLE edema       - Per patient, worse than when discharged 9/20       - Follow daily weights, will likely benefit from some degree of diuresis, but need to correct potassium level first. Consider spironolactone    Hypokalemia       - Replete per protocol    Case discussed with Dr. Foster, who is in agreement with the plan as outlined above.         Electronically Signed by:      Taylor Bolanos DNP, AOCNP, AGPCNP-BC  Nurse Practitioner  Hematology and Oncology

## 2024-09-30 NOTE — CONSULTS
[facility]   part of [facility]  Palliative Care Initial Consult Note    Dontae Cortez Jr. Patient Status:  Inpatient    10/15/1970 MRN [id number]   Location [facility] 4NW-A Attending Nixon Vergara MD   Hosp Day # 0 PCP Adrian Horowitz MD     Date of Consult: 2024  Patient seen at: [facility] Inpatient    Reason for Consultation: Consult ordered by:: Taylor Hitchcock for evaluation of Palliative Care needs and Uncontrolled symptoms    Subjective     History of Present Illness: Dontae Cortez Jr. is a 53 year old male with history of metastatic pancreatic cancer (dx 2022 see oncology consult for hx details) s/p esophagectomy and chemo (on current new tx regimen 1st cycle received 24), PE (Eliquis), HTN, CAD s/p CABG () who was admitted on 2024 for pain exacerbation, SOB and AMS (per wife). Work up in our hospital revealed CT A/P as noted below with generalized inflammatory processes and lungs with diffuse GGO bilat, also with hypokalemia, hyponatremia and BLE edema.  History was obtained from [facility], the patient and his wife Alysha.      Today is day #0 of hospitalization.     When I entered the room, the patient was awake & alert and lying in bed.  wife  present at bedside.      Review of Systems:   Symptoms(s): Pain;Cough    Dyspnea: reports was more SOB at home, improved at present but states it's painful to take a deep breath so is breathing shallow.   Cough: present, productive frothy clear sputum  Nausea: denies  Appetite: good, discussed incorporating more protein noting low albumin and total protein in diet. Appreciate dietician recs  Pain: pain mostly R side of abdomen, rating 10/10 at present, constant, tender to palpation, crampy, can also be present on L side of abdomen, back and neck.   Spouse shared he has been \"doing plumbing work under the sink\". Pain escalated over last couple days with culmination of severe pain overnight prior to  admission.   Ronen reports he has been taking his Morphine ER q12 hrs and only 2-3 doses of the OxyIR each day and has had good relief.   Spouse notes a recent change over the last few days in his voice and behavior, a little confused at times. She shared he \"was at the hardware store and with his R foot drop dragged his great toe on the floor and caught it, ROM appears RAFAEL but ecchymosis noted, also tender to touch.   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 His smoking use included cigarettes. He started smoking about 38 years ago. He has a 27 pack-year smoking history. He has never been exposed to tobacco smoke. He has never used smokeless tobacco.  reports no history of alcohol use.  reports no history of drug use.    Spiritual Assessment:   Religion - Parish Not Listed    Past Medical History/Past Surgical History:   This is the 6th hospitalization in the past 6 months.    Medical History: obtained from [employer]  Past Medical History:    Back problem    Belching    Black stools    Borderline diabetes    Dx in 8/2013 - HgA1C 6.2%    C. difficile diarrhea    pt treated and without symptoms    Chest pain    Coronary artery disease    On 8/16/13: CABG x 4 with LIMA to LAD and SVG to diagonal, OM and PDA    Decorative tattoo    Depression    Difficult intubation    h/o esophagectomy for CA; developed esophagobronchial vistula    Disorder of liver    LIVER CA    Esophageal cancer (HCC)    completed chemo    Esophageal reflux    Essential hypertension    Exposure to medical diagnostic radiation    last tx 8/18/2022    Frequent urination     Gastroparesis    Heartburn    High blood pressure    High cholesterol    Found when I had quadruple bypass    History of COVID-19    asymptomatic - pt was dx during a hospitalization for another diagnosis. No continued symptoms    History of stomach ulcers    Hyperlipidemia    Hyperlipidemia LDL goal < 70    Indigestion    Morbid obesity with BMI of 40.0-44.9, adult (HCC)    Muscle weakness    Nausea vomiting and diarrhea    Nontoxic multinodular goiter    Dx in 8/2013: pt was told that imaging showed thyroid cysts per PCP    Pancreatic cancer (HCC)    last dose 12/4/2023 is scheduled for another round 12/27/23    Peripheral vascular disease (HCC)    pt denies    Personal history of antineoplastic chemotherapy    for esophageal cancer/completed    Personal history of antineoplastic chemotherapy    pancreatic cancer    Personal history of antineoplastic chemotherapy    Last treatment 3/12/24    Problems with swallowing    Pulmonary nodules    Dx in 8/2013: CT chest showed small bilateral fissural-based lung nodules less than 1 cm    S/P CABG x 4    On 8/16/13: CABG x 4 with LIMA to LAD and SVG to diagonal, OM and PDA    Shortness of breath    when coughing; no oxygen    Vomiting     Past Surgical History:   Procedure Laterality Date    Appendectomy      Appendectomy      Arthroscopy of joint unlisted      right shoulder    Cabg      On 8/16/13: CABG x 4 with LIMA to LAD and SVG to diagonal, OM and PDA    Cardiac cath lab      On 8/14/2013: cardiac cath showed 3-vessel disease    Other surgical history      1.       Laparoscopic robotic-assisted esophagogastrectomy.    Port, indwelling, imp         Family History: obtained from [facility]  Family History   Problem Relation Age of Onset    Cancer Mother         breast and colon     Diabetes Neg        Allergies:  Allergies   Allergen Reactions    Oxaliplatin HIVES     Shaking        Medications:     Current Facility-Administered Medications:     heparin (Porcine) 100  Units/mL lock flush 500 Units, 5 mL, Intravenous, PRN    apixaban (Eliquis) tab 5 mg, 5 mg, Oral, BID    baclofen (Lioresal) tab 10 mg, 10 mg, Oral, TID    dicyclomine (Bentyl) cap 10 mg, 10 mg, Oral, TID AC&HS    furosemide (Lasix) tab 20 mg, 20 mg, Oral, Daily    losartan (Cozaar) tab 100 mg, 100 mg, Oral, Daily    metoprolol tartrate (Lopressor) tab 25 mg, 25 mg, Oral, 2x Daily(Beta Blocker)    morphINE ER (MS Contin) tab 30 mg, 30 mg, Oral, 2 times per day    OLANZapine (ZyPREXA) tab 5 mg, 5 mg, Oral, Nightly    oxyCODONE immediate release tab 10 mg, 10 mg, Oral, Q4H PRN    pantoprazole (Protonix) DR tab 40 mg, 40 mg, Oral, Before breakfast    pregabalin (Lyrica) cap 25 mg, 25 mg, Oral, BID    sertraline (Zoloft) tab 200 mg, 200 mg, Oral, Daily    acetaminophen (Tylenol Extra Strength) tab 500 mg, 500 mg, Oral, Q4H PRN    melatonin tab 3 mg, 3 mg, Oral, Nightly PRN    glycerin-hypromellose- (Artificial Tears) 0.2-0.2-1 % ophthalmic solution 1 drop, 1 drop, Both Eyes, QID PRN    sodium chloride (Saline Mist) 0.65 % nasal solution 1 spray, 1 spray, Each Nare, Q3H PRN    ondansetron (Zofran) 4 MG/2ML injection 4 mg, 4 mg, Intravenous, Q6H PRN    prochlorperazine (Compazine) 10 MG/2ML injection 5 mg, 5 mg, Intravenous, Q8H PRN    HYDROmorphone (Dilaudid) 1 MG/ML injection 0.2 mg, 0.2 mg, Intravenous, Q2H PRN **OR** HYDROmorphone (Dilaudid) 1 MG/ML injection 0.4 mg, 0.4 mg, Intravenous, Q2H PRN **OR** HYDROmorphone (Dilaudid) 1 MG/ML injection 0.8 mg, 0.8 mg, Intravenous, Q2H PRN    Functional Status History:  ADLs: bathing or showering, dressing, getting in and out of bed or a chair, walking, using the toilet, and eating  - Independent  IADLs: use the phone, shop for groceries, meal preparation, manage medicines, clean living area, use transportation by self, manage money  - Requires some assistance, spouse sets up med box  DME:  none  Falls: Yes    Palliative Performance Scale:   Prior to admission:  (pt/family reported) 90 %  Observed during hospitalization: 90 %  % Ambulation Activity Level Self-Care Intake Consciousness   100 Full  Normal  No Disease Full Normal Full   90 Full  Normal  Some Disease Full Normal Full   80 Full  Normal w/effort  Some Disease Full Normal or reduced Full   70 Reduced  Can't Perform Job  Some Disease Full Normal or reduced Full   60 Reduced  Can't Perform Hobby   Significant Disease Occ Assist Normal or reduced Full or confused   50 Mainly sit/lie Can't do any work  Extensive Disease Partial Assist Normal or reduced Full or confused   40 Mainly in bed Can't do any work  Extensive Disease Mainly Assist Normal or reduced Full or confused   30 Bed Bound Can't do any work  Extensive Disease Max Assist  Total Care Reduced  Drowsy/confused   20 Bed Bound Can't do any work  Extensive Disease Max Assist  Total Care Minimal  Drowsy/confused   10 Bed Bound Can't do any work  Extensive Disease Max Assist  Total Care Mouth Care  Drowsy/confused   0 Death        Objective      Vital Signs:  Blood pressure 148/88, pulse 80, temperature 97.6 °F (36.4 °C), temperature source Oral, resp. rate 18, height 6' 2\" (1.88 m), weight 156 lb 1.4 oz (70.8 kg), SpO2 96%.  Body mass index is 20.04 kg/m².    Physical Exam:  General: Alert & Awake. In mild respiratory distress with attempts to take deep breaths. Body habitus Thin   HEENT: AT/NC. No gross focal deficits. Dry MM   Cardiac: Irregularly irregular  Lungs: Increased effort on RA  Abdomen: Soft, tender, non-distended, hypoactive bowel sounds X 4 quadrants   Extremities: 2+ LE edema present, R great toe with ecchymosis  Neurologic: Alert and oriented to person, place, time, and situation   Psychiatric: Mood anxious   Skin: Warm and dry.    Hematology:  Lab Results   Component Value Date    WBC 11.9 (H) 09/30/2024    HGB 9.9 (L) 09/30/2024    HCT 29.9 (L) 09/30/2024    .0 09/30/2024       Coags:  Lab Results   Component Value Date    PT 20.4 (H)  01/30/2014    INR 1.17 09/10/2024    PTT 80.5 (H) 06/19/2024       Chemistry:  Lab Results   Component Value Date    CREATSERUM 0.56 (L) 09/30/2024    BUN 13 09/30/2024     (L) 09/30/2024    K 2.6 (LL) 09/30/2024     09/30/2024    CO2 27.0 09/30/2024     (H) 09/30/2024    CA 8.1 (L) 09/30/2024    ALB 1.9 (L) 09/30/2024    ALKPHO 172 (H) 09/30/2024    BILT 1.3 09/30/2024    TP 5.6 (L) 09/30/2024    AST 27 09/30/2024    ALT 37 09/30/2024    DDIMER 0.38 12/19/2023    BNP 33 08/15/2013    MG 1.8 09/20/2024    PHOS 2.9 03/15/2024    TROP <0.046 07/18/2017       Imaging:  CTA CHEST + CT ABD (W) + CT PEL (W) (CPT=71275/57216)    Result Date: 9/30/2024  CONCLUSION:  There has been interval CBD stent placement.  Additionally, in the interim from the previous exam of 9/15/2024, there has been development of diffuse patchy ground-glass opacities within the lungs bilaterally, mild to moderate diffuse intra-abdominal ascites with mesenteric and omental stranding diffusely as well as the development of subcutaneous edematous changes/stranding involving the entire visualized subcutaneous tissues of the chest, abdomen, pelvis and upper thighs.  Additionally, there has been interval increased gallbladder distension with mild gallbladder wall thickening and diffuse pericholecystic fluid and fat stranding.  There is also interval development of colonic wall thickening involving the transverse to rectosigmoid colon.  Given the multiple interval changes, the appearance would suggest an acute, diffuse inflammatory process.  The previously identified metastatic lesions of the liver and pancreas appear relatively stable.  No pulmonary emboli noted.    LOCATION:  [id number]   Dictated by (CST): Joseph Mcdaniels DO on 9/30/2024 at 8:10 AM     Finalized by (CST): Joseph Mcdaniels DO on 9/30/2024 at 8:36 AM        Summary of Discussion      I discussed reason for palliative care consultation with Patient and Spouse. They are both  familiar with PC and our inpatient and outpatient service from past encounters. He was last seen in f/u to recent inpatient stay via video visit on 9/24/24. I discussed the focus of our consult will be to provide acute pain management and continuity for OP care.     Prognostic awareness/understanding: Remains in the information gathering phase.  Patient and spouse are well informed about his cancer dx and shared he just started a new regimen 9/24/24. They are not sure if his recent acute issues are related.     Hopes/goals:   Patient was hopeful during last hospital stay that a modified chemo regimen was offered but now voices concerns with these acute issues if he will be able to continue.   Ronen hopes that his pain will be controlled. He had been doing well on the prior regimen until there recent 36 hours or so. He shared that he was only taking 2-3 OxyIR per day and was active doing projects around the house. He feels he can still do these things and it is good distraction for him.    Symptom Management/Pain:   He has received 2.8mg IV dilaudid since admission with some relief.   I discussed the IV dilaudid and OxyIR are relatively equi-potent and encouraged him to use OxyIR as first line and IV dilaudid for pain not controlled with PO if possible. We discussed the different onset and duration of each med.     Fears/concerns:   Ronen has ongoing concerns about his insurance coverage as he is only covered for his cancer related illnesses under his current plan. He had a meeting recently and they are continuing to enforce this limitation.   We discussed his electrolyte abnormalities (low K and Na). Spouse shared that they ran out of his potassium about a week ago and requested a refill but it wasn't filled. They were unsure who was responsible for ongoing refills as OP.   Concern about his BLE edema. We discussed his low protein and albumin levels as a contributing factor. He acknowledged he could be eating more  protein in his diet. Appreciate dietician recs.   I discussed concern about Ronen driving (as has been discussed previously) especially in light of recent opioid changes and wife's concerns noted above. He became defensive sharing that other providers have said it was ok. I reinforced that at this point it is not safe and he would be liable for any accidents and would not endorse him driving.   Provided emotional support to Patient and Spouse.    Advance Care Planning counseling and discussion:   HCPOA:  Document on file Yes [x] No []. Requested for file []  Healthcare Agent Appointed: Yes  Healthcare Agent's Name: Alysha Mix  Healthcare Agent's Phone Number: [phone]  Patient's wishes noted on form:  QOL [x] quantity / prolongation [].    Code Status:  Full Code discussion deferred with focus on symptom management    Problem List:  Principal Problem:    Abdominal pain, acute  Active Problems:    Hypokalemia    Abnormal CT of the chest    Lower extremity edema      Assessment and Recommendations      Goals of care: established and treatment focused   Ongoing evaluation of acute condition with recs pending from specialists.   Hopeful he can resume OP cancer directed treatment  Improved nutrition, appreciate dietician consult and recs    Advanced Care Planning:  Code Status: Full Code, discussion deferred with focus on symptom mangement  HCPOA: document on file reflecting QOL vs quantity  Healthcare Agent's Name: Alysha Mix  Symptoms Pain  I discussed the IV dilaudid 0.8mg and OxyIR 10mg are relatively equi-potent and encouraged him to use OxyIR as first line (he was only using 2-3 tabs per day PTA with good relief) and IV dilaudid for pain not controlled with PO if possible. We discussed the different onset and duration of each med.   CONTINUE MSER 30mg q12hrs  CONTINUE OxyIR 10mg q4hrs prn  CONTINUE dilaudid IV panel as ordered for severe pain not controlled with PO  CONTINUE adjuvants Lyrica  25mg BID   Baclofen 10mg TID, Bentyl 10 mg QID, Zoloft 200mg dly (per primary service)    Discussed today's visit with  RN, Care team, and hospitalist.    Palliative Care Follow Up: Palliative care team will Continue to follow while inpatient.  Palliative care follow up at discharge: Established with [facility].    Thank you for allowing Palliative Care services to participate in the care of Dontae Goodwin Diego Champion.    A total of 45 minutes were spent on this consult, which included all of the following: chart review, direct face to face contact, history taking, physical examination, counseling and coordinating care, and documentation    Melissa Castaneda, ARTEMIO  9/30/2024  2:25 PM  Palliative Care Services    The 21st Century Cures Act makes medical notes like these available to patients in the interest of transparency. Please be advised this is a medical document. Medical documents are intended to carry relevant information, facts as evident, and the clinical opinion of the practitioner. The medical note is intended as peer to peer communication and may appear blunt or direct. It is written in medical language and may contain abbreviations or verbiage that are unfamiliar.

## 2024-09-30 NOTE — CONSULTS
[facility]  Pulmonary/Critical Care Consult note    Dontae Cortez Jr. Patient Status:  Inpatient    10/15/1970 MRN [id number]   Location [facility] 4NW-A Attending Nixon Vergara MD   Hosp Day # 0 PCP Adrian Horowitz MD     Reason for Consultation:  Abnormal CT chest    History of Present Illness:  Dontae Cortez Jr. is a a(n) 53 year old male.  With history of depression, liver cancer, GERD, hypertension, hyperlipidemia, morbid obesity with history of pulmonary nodules.  Who also has metastatic pancreatic cancer and is status post chemotherapy 1 week ago.  He presented to the emergency department with complaints of upper abdominal pains.    The patient admits to mild shortness of breath and occasional cough    History:  Past Medical History:    Back problem    Belching    Black stools    Borderline diabetes    Dx in 2013 - HgA1C 6.2%    C. difficile diarrhea    pt treated and without symptoms    Chest pain    Coronary artery disease    On 13: CABG x 4 with LIMA to LAD and SVG to diagonal, OM and PDA    Decorative tattoo    Depression    Difficult intubation    h/o esophagectomy for CA; developed esophagobronchial vistula    Disorder of liver    LIVER CA    Esophageal cancer (HCC)    completed chemo    Esophageal reflux    Essential hypertension    Exposure to medical diagnostic radiation    last tx 2022    Frequent urination    Gastroparesis    Heartburn    High blood pressure    High cholesterol    Found when I had quadruple bypass    History of COVID-19    asymptomatic - pt was dx during a hospitalization for another diagnosis. No continued symptoms    History of stomach ulcers    Hyperlipidemia    Hyperlipidemia LDL goal < 70    Indigestion    Morbid obesity with BMI of 40.0-44.9, adult (HCC)    Muscle weakness    Nausea vomiting and diarrhea    Nontoxic multinodular goiter    Dx in 2013: pt was told that imaging showed thyroid cysts per PCP    Pancreatic cancer (HCC)     last dose 12/4/2023 is scheduled for another round 12/27/23    Peripheral vascular disease (HCC)    pt denies    Personal history of antineoplastic chemotherapy    for esophageal cancer/completed    Personal history of antineoplastic chemotherapy    pancreatic cancer    Personal history of antineoplastic chemotherapy    Last treatment 3/12/24    Problems with swallowing    Pulmonary nodules    Dx in 8/2013: CT chest showed small bilateral fissural-based lung nodules less than 1 cm    S/P CABG x 4    On 8/16/13: CABG x 4 with LIMA to LAD and SVG to diagonal, OM and PDA    Shortness of breath    when coughing; no oxygen    Vomiting     Past Surgical History:   Procedure Laterality Date    Appendectomy      Appendectomy      Arthroscopy of joint unlisted      right shoulder    Cabg      On 8/16/13: CABG x 4 with LIMA to LAD and SVG to diagonal, OM and PDA    Cardiac cath lab      On 8/14/2013: cardiac cath showed 3-vessel disease    Other surgical history      1.       Laparoscopic robotic-assisted esophagogastrectomy.    Port, indwelling, imp       Family History   Problem Relation Age of Onset    Cancer Mother         breast and colon     Diabetes Neg       reports that he quit smoking about 11 years ago. His smoking use included cigarettes. He started smoking about 38 years ago. He has a 27 pack-year smoking history. He has never been exposed to tobacco smoke. He has never used smokeless tobacco. He reports that he does not drink alcohol and does not use drugs.    Allergies:  Allergies   Allergen Reactions    Oxaliplatin HIVES     Shaking        Medications:    Current Facility-Administered Medications:     HYDROmorphone (Dilaudid) 1 MG/ML injection 0.5 mg, 0.5 mg, Intravenous, Q30 Min PRN    potassium chloride (Klor-Con) 20 MEQ oral powder 20 mEq, 20 mEq, Oral, Once    Intake/Output:  No intake or output data in the 24 hours ending 09/30/24 1149   Body mass index is 20.04 kg/m².    Review of Systems  Review of  Systems:   A comprehensive 10 point review of systems was completed.  Pertinent positives and negatives noted in the the HPI.         Patient Vitals for the past 24 hrs:   BP Temp Temp src Pulse Resp SpO2 Height Weight   09/30/24 1132 -- -- -- -- -- -- -- 156 lb 1.4 oz (70.8 kg)   09/30/24 1100 148/88 97.6 °F (36.4 °C) Oral 80 18 96 % -- 156 lb (70.8 kg)   09/30/24 0959 (!) 139/92 97.7 °F (36.5 °C) Oral 74 18 98 % -- --   09/30/24 0921 -- -- -- -- -- 99 % -- --   09/30/24 0920 -- -- -- -- 18 (!) 89 % -- --   09/30/24 0914 135/84 -- -- 75 18 93 % -- --   09/30/24 0819 137/89 -- -- 75 18 97 % -- --   09/30/24 0713 136/77 -- -- 69 18 95 % -- --   09/30/24 0559 142/77 98.8 °F (37.1 °C) Oral 66 22 94 % 6' 2\" (1.88 m) 162 lb 0.6 oz (73.5 kg)     Vitals:    09/30/24 0921 09/30/24 0959 09/30/24 1100 09/30/24 1132   BP:  (!) 139/92 148/88    BP Location:   Right arm    Pulse:  74 80    Resp:  18 18    Temp:  97.7 °F (36.5 °C) 97.6 °F (36.4 °C)    TempSrc:  Oral Oral    SpO2: 99% 98% 96%    Weight:   156 lb (70.8 kg) 156 lb 1.4 oz (70.8 kg)   Height:             Physical Exam  Constitutional:       General: He is not in acute distress.     Appearance: Normal appearance. He is not ill-appearing or diaphoretic.   HENT:      Head: Normocephalic and atraumatic.      Nose: Nose normal. No congestion or rhinorrhea.      Mouth/Throat:      Mouth: Mucous membranes are moist.      Pharynx: Oropharynx is clear. No oropharyngeal exudate or posterior oropharyngeal erythema.   Eyes:      Extraocular Movements: Extraocular movements intact.      Pupils: Pupils are equal, round, and reactive to light.   Cardiovascular:      Rate and Rhythm: Normal rate.      Pulses: Normal pulses.      Heart sounds: Normal heart sounds. No murmur heard.  Pulmonary:      Effort: Pulmonary effort is normal. No respiratory distress.      Breath sounds: Normal breath sounds. No wheezing or rhonchi.      Comments: Diminished breath sounds in bilateral lower  lobes  Chest:      Chest wall: No tenderness.   Abdominal:      General: Abdomen is flat. Bowel sounds are normal.      Palpations: Abdomen is soft.   Musculoskeletal:         General: Normal range of motion.   Skin:     General: Skin is warm.   Neurological:      General: No focal deficit present.      Mental Status: He is alert and oriented to person, place, and time.   Psychiatric:         Mood and Affect: Mood normal.         Behavior: Behavior normal.         Thought Content: Thought content normal.         Judgment: Judgment normal.            Lab Data Review:  Recent Labs   Lab 09/30/24  0637   WBC 11.9*   HGB 9.9*   HCT 29.9*   .0       Recent Labs   Lab 09/30/24  0637   *   K 2.6*      CO2 27.0   BUN 13   CREATSERUM 0.56*   CA 8.1*   ALB 1.9*   ALKPHO 172*   ALT 37   AST 27   *       No results for input(s): \"MG\" in the last 168 hours.    Lab Results   Component Value Date    PHOS 2.9 03/15/2024        No results for input(s): \"PT\", \"INR\", \"PTT\" in the last 168 hours.    No results for input(s): \"ABGPHT\", \"OLNGJD0T\", \"ZLZMT0V\", \"ABGHCO3\", \"ABGBE\", \"TEMP\", \"TACOS\", \"SITE\", \"DEV\", \"THGB\" in the last 168 hours.    No results for input(s): \"TROP\", \"CKMB\" in the last 168 hours.    Cultures: No results found for this visit on 09/30/24.        Radiology personally reviewed:  CTA CHEST + CT ABD (W) + CT PEL (W) (CPT=71275/77010)    Result Date: 9/30/2024  CONCLUSION:  There has been interval CBD stent placement.  Additionally, in the interim from the previous exam of 9/15/2024, there has been development of diffuse patchy ground-glass opacities within the lungs bilaterally, mild to moderate diffuse intra-abdominal ascites with mesenteric and omental stranding diffusely as well as the development of subcutaneous edematous changes/stranding involving the entire visualized subcutaneous tissues of the chest, abdomen, pelvis and upper thighs.  Additionally, there has been interval increased  gallbladder distension with mild gallbladder wall thickening and diffuse pericholecystic fluid and fat stranding.  There is also interval development of colonic wall thickening involving the transverse to rectosigmoid colon.  Given the multiple interval changes, the appearance would suggest an acute, diffuse inflammatory process.  The previously identified metastatic lesions of the liver and pancreas appear relatively stable.  No pulmonary emboli noted.    LOCATION:  [facility]   Dictated by (CST): Joseph Mcdaniels DO on 9/30/2024 at 8:10 AM     Finalized by (CST): Joseph Mcdaniels DO on 9/30/2024 at 8:36 AM         Patient Active Problem List   Diagnosis    Primary hypertension    Hyperlipidemia with target low density lipoprotein (LDL) cholesterol less than 70 mg/dL    Coronary artery disease involving coronary bypass graft of native heart with angina pectoris (HCC)    S/P CABG x 4    Nontoxic multinodular goiter    Pulmonary nodules    Thrombophlebitis leg    BRANDAN (generalized anxiety disorder)    Right shoulder Arthroscopy acromioplasty ,distal  clavicle resection, rotator cuff/labral debridment  Global 05/13/2021    Right bicipital tenosynovitis    Malignant neoplasm of esophagus (HCC)    Pleural effusion    Esophageal anastomotic leak    Esophageal obstruction    On total parenteral nutrition (TPN)    Mechanical complication of esophagostomy (HCC)    Abdominal pain of unknown etiology    Migration of esophageal stent    Gastroparesis    Esophageal carcinoma (HCC)    Normocytic anemia    Esophageal fistula    Subacute cough    Acquired bronchoesophageal fistula (HCC)    Pneumonia of both lower lobes due to infectious organism    Malignant neoplasm of pancreas (HCC)    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 difficile colitis    Dysphagia, unspecified type    Dyspnea and respiratory abnormalities    Hypokalemia    Dysphagia    Narcotic drug use    History of esophageal cancer    Palliative care by specialist    Neoplasm related pain    Endobronchial mass    Hyponatremia    Thrombocytopenia (HCC)    Acute kidney injury (HCC)    Hyperglycemia    Aspiration pneumonitis (HCC)    C. difficile diarrhea    Aspiration pneumonia (HCC)    Malignant neoplasm metastatic to liver (HCC)    Hyperlipidemia    Nausea vomiting and diarrhea    Fistula, bronchoesophageal (HCC)    Iron deficiency anemia, unspecified iron deficiency anemia type    Azotemia    Palliative care encounter    Goals of care, counseling/discussion    Cancer related pain    Anemia    Fistula    Acute cough    Pneumonia of right lung due to infectious organism, unspecified part of lung    Bacteremia    Acute postoperative pain    Exocrine pancreatic insufficiency (HCC)    Hypertriglyceridemia    Acute pulmonary embolism without acute cor pulmonale, unspecified pulmonary embolism type (HCC)    Acute deep vein thrombosis (DVT) of popliteal vein of right lower extremity (HCC)    Altered mental status, unspecified altered mental status type    Transaminitis    Biliary obstruction (HCC)    Hyperbilirubinemia    Esophageal adenocarcinoma (HCC)    Cancer associated pain    Abdominal pain, acute       Assessment:  Acute hypoxic respiratory failure: Currently on room air  Pulmonary nodules  Groundglass opacities involving by lateral upper lobes and left lower lobe peripheral: This could represent atypical bacterial infection versus viral infection versus related to chemotherapy etc.: Elevated ESR and CRP elevated WBC count with left shift  Nonocclusive pulmonary embolism and left upper lobe and left lower lobe branches of pulmonary artery  Mediastinal adenopathy with conglomeration of  AP window lymph nodes 2.5 x 2.4 cm  Pancreatic cancer status postchemotherapy  Depression, liver cancer,  GERD,   Dilated esophagus with pull-through procedure previously after esophageal cancer resection  Hypertension,  Hyperlipidemia,  Morbid obesity  History of pulmonary nodules.      Plan:  Wean FiO2 off to keep oxygen saturation between 90% to 92%  Continue current antibiotics  DVT prophylaxis: SCD boots   GI prophylaxis: ---  Will follow for further recommendations  Check procalcitonin in a.m.    Thank You for allowing me to participate in this patient's care     Dusty Brooks MD    Note to the patient: The 21st Century Cures Act makes medical notes like these available to patients in the interest of transparency. However, be advised that this is a medical document. It is intended as peer to peer communication. It is written in medical language and may contain abbreviations or verbiage that are unfamiliar. It may appear blunt or direct. Medical documents are intended to carry relevant information, facts as evident, and clinical opinion of the practitioner.      Disclaimer: Components of this note were documented using voice recognition system and are subject to errors not corrected at proofreading. Contact the author of this note for any clarifications

## 2024-09-30 NOTE — CONSULTS
[facility]                       [facility]    Dontae Buddy Cortez . Patient Status:  Inpatient    10/15/1970 MRN [id number]   Location [facility] 4NW-A Attending Nixon Vergara MD   Hosp Day # 0 PCP Adrian Horowitz MD     Reason for consultation: Abdominal pain  HPI: This is a 53 yr-old male with an extensive PMhx that includes CAD s/p CABG, dyslipidemia, C Diff, and distal esophageal adenocarcinoma (dx 2022) s/p chemoRT followed by laparoscopic robotic assisted esophagogastrectomy (2022; Dr Lu) recovery c/b anastomotic leak requiring endoscopic closure with stenting with course further c/b GOO s/p EGD with stenting + Botox. Pt noted to have pancreatic head mass with EUS revealing mod/poorly differentiated carcinoma (2023) treated with chemo + immunotherapy.   Last  pt with numerous admissions for cough/recurrent PNA found to have severe ulceration at esophagogastric anastomosis with a bronchial fistula tract opening requiring numerous endoscopic procedures to close this fistula however pt eventually required Latissimus dorsi flap reconstruction (2024; [facility]). Pt was noted to have new lymph adenopathy, new liver + pancreatic lesions and re-started chemo (2024; followed by Dr Foster).   He was recently admitted from 9/15- with elevated liver enzymes 2/2 malignancy biliary obstruction s/p ERCP with biliary sphincterotomy and CBD/PD stent placement (; Dr. Ritchie). CTA, Chest/abd pelvis at this time suggested progressive hepatic metastatic disease.   Pt admitted today with diffuse abdominal pain, starting 3-4 days ago. At baseline, pt denies abd pain. Pain has been progressively worsening since initial onset. Denies N/V. Does endorse worsening dysphagia from baseline. He also notes significant lower extremity edema bilaterally, with L > R. Jaundice and scleral icterus have been progressively improving since CBD and PD stent placements.  Currently without jaundice. Liver enzymes have been progressively improving since last admission, currently Alk Phos 172, AST 27, ALT 37, tBili 1.3 - down from Alk Phos 383, AST 36, ALT 97, and tBili 1.9 on 9/23.   CTA Chest + CT abd/pelvis with interval development of diffuse patchy GGOs within the lung bases bilaterally, mild to moderate diffuse intra-abdominal ascites with mesenteric and omental stranding diffusely as well as the development of subcutaneous edematous changes/stranding involving the entire visualized subcutaneous tissues of the chest, abdomina, pelvis and upper thighs. There is increased gallbladder distension with mild gallbladder wall thickening and diffuse pericholecystic fluid and fat stranding with interval development of colonic wall thickening involving the transverse to rectosigmoid colon.   Pt denies sick contacts.   PMHx:   Past Medical History:    Back problem    Belching    Black stools    Borderline diabetes    Dx in 8/2013 - HgA1C 6.2%    C. difficile diarrhea    pt treated and without symptoms    Chest pain    Coronary artery disease    On 8/16/13: CABG x 4 with LIMA to LAD and SVG to diagonal, OM and PDA    Decorative tattoo    Depression    Difficult intubation    h/o esophagectomy for CA; developed esophagobronchial vistula    Disorder of liver    LIVER CA    Esophageal cancer (HCC)    completed chemo    Esophageal reflux    Essential hypertension    Exposure to medical diagnostic radiation    last tx 8/18/2022    Frequent urination    Gastroparesis    Heartburn    High blood pressure    High cholesterol    Found when I had quadruple bypass    History of COVID-19    asymptomatic - pt was dx during a hospitalization for another diagnosis. No continued symptoms    History of stomach ulcers    Hyperlipidemia    Hyperlipidemia LDL goal < 70    Indigestion    Morbid obesity with BMI of 40.0-44.9, adult (HCC)    Muscle weakness    Nausea vomiting and diarrhea    Nontoxic multinodular  goiter    Dx in 8/2013: pt was told that imaging showed thyroid cysts per PCP    Pancreatic cancer (HCC)    last dose 12/4/2023 is scheduled for another round 12/27/23    Peripheral vascular disease (HCC)    pt denies    Personal history of antineoplastic chemotherapy    for esophageal cancer/completed    Personal history of antineoplastic chemotherapy    pancreatic cancer    Personal history of antineoplastic chemotherapy    Last treatment 3/12/24    Problems with swallowing    Pulmonary nodules    Dx in 8/2013: CT chest showed small bilateral fissural-based lung nodules less than 1 cm    S/P CABG x 4    On 8/16/13: CABG x 4 with LIMA to LAD and SVG to diagonal, OM and PDA    Shortness of breath    when coughing; no oxygen    Vomiting     PSHx:   Past Surgical History:   Procedure Laterality Date    Appendectomy      Appendectomy      Arthroscopy of joint unlisted      right shoulder    Cabg      On 8/16/13: CABG x 4 with LIMA to LAD and SVG to diagonal, OM and PDA    Cardiac cath lab      On 8/14/2013: cardiac cath showed 3-vessel disease    Other surgical history      1.       Laparoscopic robotic-assisted esophagogastrectomy.    Port, indwelling, imp       Medications:    [COMPLETED] ondansetron (Zofran) 4 MG/2ML injection 4 mg  4 mg Intravenous Once    [COMPLETED] potassium chloride 20 mEq/100mL IVPB premix 20 mEq  20 mEq Intravenous Once    [COMPLETED] iopamidol 76% (ISOVUE-370) injection for power injector  100 mL Intravenous ONCE PRN    [COMPLETED] piperacillin-tazobactam (Zosyn) 4.5 g in dextrose 5% 100 mL IVPB-ADDV  4.5 g Intravenous Once    [COMPLETED] HYDROmorphone (Dilaudid) 1 MG/ML injection 0.5 mg  0.5 mg Intravenous Once    heparin (Porcine) 100 Units/mL lock flush 500 Units  5 mL Intravenous PRN    apixaban (Eliquis) tab 5 mg  5 mg Oral BID    baclofen (Lioresal) tab 10 mg  10 mg Oral TID    dicyclomine (Bentyl) cap 10 mg  10 mg Oral TID AC&HS    furosemide (Lasix) tab 20 mg  20 mg Oral Daily     losartan (Cozaar) tab 100 mg  100 mg Oral Daily    metoprolol tartrate (Lopressor) tab 25 mg  25 mg Oral 2x Daily(Beta Blocker)    morphINE ER (MS Contin) tab 30 mg  30 mg Oral 2 times per day    OLANZapine (ZyPREXA) tab 5 mg  5 mg Oral Nightly    oxyCODONE immediate release tab 10 mg  10 mg Oral Q4H PRN    pantoprazole (Protonix) DR tab 40 mg  40 mg Oral Before breakfast    pregabalin (Lyrica) cap 25 mg  25 mg Oral BID    sertraline (Zoloft) tab 200 mg  200 mg Oral Daily    acetaminophen (Tylenol Extra Strength) tab 500 mg  500 mg Oral Q4H PRN    melatonin tab 3 mg  3 mg Oral Nightly PRN    glycerin-hypromellose- (Artificial Tears) 0.2-0.2-1 % ophthalmic solution 1 drop  1 drop Both Eyes QID PRN    sodium chloride (Saline Mist) 0.65 % nasal solution 1 spray  1 spray Each Nare Q3H PRN    ondansetron (Zofran) 4 MG/2ML injection 4 mg  4 mg Intravenous Q6H PRN    prochlorperazine (Compazine) 10 MG/2ML injection 5 mg  5 mg Intravenous Q8H PRN    HYDROmorphone (Dilaudid) 1 MG/ML injection 0.2 mg  0.2 mg Intravenous Q2H PRN    Or    HYDROmorphone (Dilaudid) 1 MG/ML injection 0.4 mg  0.4 mg Intravenous Q2H PRN    Or    HYDROmorphone (Dilaudid) 1 MG/ML injection 0.8 mg  0.8 mg Intravenous Q2H PRN     Allergies:   Allergies   Allergen Reactions    Oxaliplatin HIVES     Shaking      SocHx:  No history of smoking;  The patient drinks alcohol on social occasions; The patient has no history of IV drug use or other illicit substances.  FamHx: The patient has no family history of colon cancer or other gastrointestinal malignancies;  No family history of ulcer disease, or inflammatory bowel disease  ROS:  In addition to the pertinent positives described above:            Infectious Disease:  + Cdiff (10/2022 and 12/2023)            Cardiovascular: + CAD s/p CABG, HTN, dyslipidemia            Respiratory: + recurrent PNA            Hematologic: + PE/DVT            Dermatologic: The patient reports no recent rashes or chronic  skin disorders            Rheumatologic: The patient reports no history of chronic arthritis, myalgias, arthralgias            Genitourinary:  The patient reports no history of recurrent urinary tract infections, hematuria, dysuria, or nephrolithiasis           Psychiatric: + Depression           Oncologic: + metastatic esophageal adenocarcinoma            ENT: The patient reports no hoarseness of voice, hearing loss, sinus congestion, tinnitus           Neurologic: The patient reports no history of seizure, stroke, or frequent headaches  PE: /88 (BP Location: Right arm)   Pulse 80   Temp 97.6 °F (36.4 °C) (Oral)   Resp 18   Ht 6' 2\" (1.88 m)   Wt 156 lb 1.4 oz (70.8 kg)   SpO2 96%   BMI 20.04 kg/m²   Gen: AAO x 3, able to speak in complete sentences  HENT: EOMI, PERRL, oropharynx is clear with moist mucosal membranes  Eyes: Sclerae are anicteric  Neck:  Supple without nuchal rigidity  CV: Regular rate and rhythm, with normal S1 and S2  Resp: Clear to auscultation bilaterally without wheezes; rubs, rhonchi, or rales  Abdomen: Soft, diffuse tenderness most pronounced in periumbilical region, non-distended with the presence of bowel sounds; No hepatosplenomegaly; no rebound ; + guarding; No ascites is clinically apparent; no tympany to percussion  Ext: + BLE edema, L > R  Skin: Warm and dry, pale. No jaundice or scleral icterus.   Psychiatric: Appropriate mood and congruent affect without obvious depression or anxiety  Labs:   Lab Results   Component Value Date    WBC 11.9 09/30/2024    HGB 9.9 09/30/2024    HCT 29.9 09/30/2024    .0 09/30/2024    CREATSERUM 0.56 09/30/2024    BUN 13 09/30/2024     09/30/2024    K 2.6 09/30/2024     09/30/2024    CO2 27.0 09/30/2024     09/30/2024    CA 8.1 09/30/2024    ALB 1.9 09/30/2024    ALKPHO 172 09/30/2024    BILT 1.3 09/30/2024    AST 27 09/30/2024    ALT 37 09/30/2024    LIP 13 09/30/2024     Recent Labs   Lab 09/30/24  0637   *    BUN 13   CREATSERUM 0.56*   CA 8.1*   *   K 2.6*      CO2 27.0     Recent Labs   Lab 09/30/24  0637   RBC 3.19*   HGB 9.9*   HCT 29.9*   MCV 93.7   MCH 31.0   MCHC 33.1   RDW 14.3   NEPRELIM 10.76*   WBC 11.9*   .0       Recent Labs   Lab 09/30/24  0637   ALT 37   AST 27       Imaging:   PROCEDURE:  CTA CHEST + CT ABD (W) + CT PEL (W) SH(CPT=71275/66260)     COMPARISON:  [facility] , CT, CTA CHEST + CT ABD (W) + CT PEL (W) SH(CPT=71275/56878), 9/15/2024, 2:24 PM.     INDICATIONS:  53-year-old male with history of metastatic a esophageal cancer presents with severe right lower chest/rib pain and right upper quadrant abdominal pain.     TECHNIQUE:  CT of the chest (with CTA imaging), abdomen, and pelvis were obtained post- injection of non-ionic intravenous contrast material. Multi-planar reformatted/3-D images were created to optimize visualization of vascular anatomy.  Dose reduction  techniques were used. Dose information is transmitted to the ACR (American College of Radiology) NRDR (National Radiology Data Registry) which includes the Dose Index Registry.     PATIENT STATED HISTORY:(As transcribed by Technologist)  Patient complains of right lower rib pain / upper right abdomen pain.      CONTRAST USED:  100cc of Isovue 370     FINDINGS:       CHEST:    LUNGS/PLEURA:  There has been interval development of diffuse patchy ground-glass type airspace opacities involving bilateral upper lobes, and left lower lobe.  There is a mild right pleural effusion versus pleural thickening/pleural reaction which is  slightly increased from prior imaging.  MEDIASTINUM:  No mass or adenopathy.  Patient is status post esophagectomy with gastric pull-through procedure, appearing stable from prior imaging.  AIDAN:  No mass or adenopathy.    CARDIAC:  Severe coronary artery atherosclerotic changes, stable from prior imaging.  CHEST WALL:  No mass or axillary adenopathy.    AORTA:  Stable aortic ectasia without shellie  aneurysm.  There is a aberrant right subclavian artery which emanates from the mid to posterior arch and extends through the posterior aspect of the mediastinum, posterior to the gastric pull-through.  VASCULATURE:  No visible pulmonary arterial thrombus or attenuation.       ABDOMEN/PELVIS:  LIVER:  Intrahepatic metastatic lesions are again identified and are stable.  The segment 7 lesion currently measures 4.0 x 3.2 cm, previously measuring 4.2 x 2.9 cm.  Segment 8 lesion currently measures 3.5 x 3.4 cm, previously measuring 3.5 x 3.6 cm.    Third lesion identified the junction of segments 5 and 8 currently measures 2.7 x 2.4 cm, previously measuring 2.6 x 2.6 cm.  No new hepatic lesions.    BILIARY:  There has been interval placement of a CBD stent with a moderate degree of pneumobilia.  There has been interval development of moderate gallbladder distension with diffuse pericholecystic fat stranding and fluid.  Associated gallbladder wall  thickening is noted.  No gallstones noted.  PANCREAS:  Stable pancreatic mass lesions, including a 2.8 x 3.5 cm lesion along the body and a 0.9 x 1.0 cm lesion along the head, anteromedial to the CBD stent.  SPLEEN:  No enlargement or focal lesion.    KIDNEYS:  No mass, obstruction, or calcification.    ADRENALS:  No mass or enlargement.    AORTA:  No aneurysm or dissection.    RETROPERITONEUM:  No mass or adenopathy.    BOWEL/MESENTERY:  There has been interval development of mild to moderate intra-abdominal ascites.  Small bowel is decompressed and unremarkable.  There has been interval development of colonic wall thickening extending from the transverse colon.  There  is also been interval development of diffuse haziness/stranding to the mesentery and omentum, perhaps relating to the presence of ascites.  ABDOMINAL WALL:  There has been interval development of diffuse fat stranding/edema to the subcutaneous tissues of the chest wall, abdomen and flank regions extending into  the subcutaneous tissues of the pelvis and thighs.  URINARY BLADDER:  No visible focal wall thickening, lesion, or calculus.    PELVIC NODES:  No adenopathy.    PELVIC ORGANS:  No visible mass.  Pelvic organs appropriate for patient age.    BONES:  No bony lesion or fracture.                   Impression   CONCLUSION:    There has been interval CBD stent placement.     Additionally, in the interim from the previous exam of 9/15/2024, there has been development of diffuse patchy ground-glass opacities within the lungs bilaterally, mild to moderate diffuse intra-abdominal ascites with mesenteric and omental stranding  diffusely as well as the development of subcutaneous edematous changes/stranding involving the entire visualized subcutaneous tissues of the chest, abdomen, pelvis and upper thighs.  Additionally, there has been interval increased gallbladder distension  with mild gallbladder wall thickening and diffuse pericholecystic fluid and fat stranding.  There is also interval development of colonic wall thickening involving the transverse to rectosigmoid colon.     Given the multiple interval changes, the appearance would suggest an acute, diffuse inflammatory process.     The previously identified metastatic lesions of the liver and pancreas appear relatively stable.     No pulmonary emboli noted.           LOCATION:  [id number]        Dictated by (CST): Jospeh Mcdaniels DO on 9/30/2024 at 8:10 AM      Finalized by (CST): Joseph Mcdaniels DO on 9/30/2024 at 8:36 AM     Impression:   53 year old male with PMHx of metastatic esophageal and pancreatic cancer with mets to liver, presenting with acute diffuse abdominal pain x 3-4 days. CTA c/a/p with findings suggestive of an acute, diffuse inflammatory process with inflammatory changes notes to the colon, mesentery, omentum, gallbladder, and subcutaneous fat.   Diffuse abdominal pain - Liver enzymes have been progressively improving since last admission, currently Alk Phos  172, AST 27, ALT 37, tBili 1.3. This, in conjunction with resolution of jaundice makes occlusion of CBD stent less likely. R/o portal vein thrombus.   Ascites - Obtain diagnostic paracentesis.   Dysphagia - Progressive worsening. Suspect 2/2 known h/o metastatic esophageal cancer. Pending clinical course, may warrant EGD with dilation vs stenting.     Recommendations:   US Abdomen Doppler  US Paracentesis with peritoneal fluid studies.   Monitor CMP, CBC.   Maintenance fluids, analgesia, and anti-emetics per primary service.   Appreciate oncology, palliative care recs.  Continue to monitor dysphagia - if worsening, consider EGD.       Thank you for the consultation, we will follow the patient with you. Attending addendum (Dr. TRUNG Sierra) to follow with formal, final recommendations.   Rosa Maria Danielson, ARTEMIO  4:00 PM  9/30/2024  [facility]  [phone]    GI attending addendum:    I have personally seen and examined this patient and agree with above nurse practitioner's assessment and recommendations.     Briefly, patient is a 53-year-old male with chronic medical conditions including esophageal adenocarcinoma status post chemo, radiation, and surgery complicated by anastomotic leak and also found to have pancreatic head mass treated with chemo and immunotherapy.  Most recently he underwent biliary stent placement.  He presented back to the ER today with abdominal pain that started around 1 AM last night.  He notes a focal port in the right upper quadrant that waxes and wanes.  He denies any exacerbation with meals.  He did have a quarter pounder with cheese yesterday without difficulty until he was awoken up with the pain at 1 AM.  He had a CT on admission showing mild to moderate ascites and development of subcutaneous edema involving the entire visualized subcutaneous tissues of the chest, abdomen, pelvis, and upper thighs.  Gallbladder distention was also noted with wall thickening and diffuse  pericholecystic fluid and fat stranding.  Colonic wall thickening involving the transverse and rectosigmoid colon was also noted.  On physical exam, patient was resting comfortably bed.  Wife is present in room.  He is tender to palpation in the right upper quadrant.  Patient with right upper quadrant abdominal pain etiology currently unknown.  CT with findings concerning for possible cholecystitis.  His LFTs have improved since his recent stent placement on September 17, 2024.  Abdominal pain may also be due to pancreas and liver mets.  He is also noted to have ascites which could be contributing.  Will order ultrasound of abdomen with Doppler to better assess gallbladder and also assess for PVT.  Will also order ultrasound paracentesis with fluid studies in case there is enough ascites to drain.  Will monitor CBC and CMP in the morning.  Thank you for the consultation.  Will continue to follow along with you.    Giuseppe Sierra DO  10:43 PM  9/30/2024  [facility]  [phone]

## 2024-09-30 NOTE — ED PROVIDER NOTES
Patient Seen in: [facility] In [place]      History     Chief Complaint   Patient presents with    Pain     Stated Complaint: pain    Subjective:   HPI    53-year-old male with history of metastatic pancreatic cancer with last chemotherapy 1 week ago presents for evaluation of upper abdominal pain, mostly right sided, starting overnight.  No cough cold or increased shortness of breath.  History of PE this year but faithfully taking Eliquis twice daily.  Wife however has noticed some increased swelling in his lower extremities over the past few days.  Patient had a biliary stent placed 2 weeks ago for biliary obstruction related to cancer spread:    Date of Admission: 9/15/2024  Date of Discharge:  9/20/2024      Discharge Disposition: Home or Self Care     Discharge Diagnosis:  Elevated liver enzymes secondary to malignant biliary obstruction s/p ERCP with biliary sphincterotomy and CBD/PD stent placement 9/17  Metastatic esophageal carcinoma  Metastatic pancreatic carcinoma   Cancer related pain  Previous PE/DVT  Essential HTN  Dyslipidemia  GERD/PUD  CAD with prior CABG  Depression/anxiet    Objective:   Past Medical History:    Back problem    Belching    Black stools    Borderline diabetes    Dx in 8/2013 - HgA1C 6.2%    C. difficile diarrhea    pt treated and without symptoms    Chest pain    Coronary artery disease    On 8/16/13: CABG x 4 with LIMA to LAD and SVG to diagonal, OM and PDA    Decorative tattoo    Depression    Difficult intubation    h/o esophagectomy for CA; developed esophagobronchial vistula    Disorder of liver    LIVER CA    Esophageal cancer (HCC)    completed chemo    Esophageal reflux    Essential hypertension    Exposure to medical diagnostic radiation    last tx 8/18/2022    Frequent urination    Gastroparesis    Heartburn    High blood pressure    High cholesterol    Found when I had quadruple bypass    History of COVID-19    asymptomatic - pt was dx during a  hospitalization for another diagnosis. No continued symptoms    History of stomach ulcers    Hyperlipidemia    Hyperlipidemia LDL goal < 70    Indigestion    Morbid obesity with BMI of 40.0-44.9, adult (HCC)    Muscle weakness    Nausea vomiting and diarrhea    Nontoxic multinodular goiter    Dx in 2013: pt was told that imaging showed thyroid cysts per PCP    Pancreatic cancer (HCC)    last dose 2023 is scheduled for another round 23    Peripheral vascular disease (HCC)    pt denies    Personal history of antineoplastic chemotherapy    for esophageal cancer/completed    Personal history of antineoplastic chemotherapy    pancreatic cancer    Personal history of antineoplastic chemotherapy    Last treatment 3/12/24    Problems with swallowing    Pulmonary nodules    Dx in 2013: CT chest showed small bilateral fissural-based lung nodules less than 1 cm    S/P CABG x 4    On 13: CABG x 4 with LIMA to LAD and SVG to diagonal, OM and PDA    Shortness of breath    when coughing; no oxygen    Vomiting              Past Surgical History:   Procedure Laterality Date    Appendectomy      Appendectomy      Arthroscopy of joint unlisted      right shoulder    Cabg      On 13: CABG x 4 with LIMA to LAD and SVG to diagonal, OM and PDA    Cardiac cath lab      On 2013: cardiac cath showed 3-vessel disease    Other surgical history      1.       Laparoscopic robotic-assisted esophagogastrectomy.    Port, indwelling, imp                  Social History     Socioeconomic History    Marital status:     Number of children: 3   Occupational History    Occupation: works as  - on workman's comp   Tobacco Use    Smoking status: Former     Current packs/day: 0.00     Average packs/day: 1 pack/day for 27.0 years (27.0 ttl pk-yrs)     Types: Cigarettes     Start date: 8/15/1986     Quit date: 8/15/2013     Years since quittin.1     Passive exposure: Never    Smokeless tobacco: Never     Tobacco comments:     Quit smoking 2013   Vaping Use    Vaping status: Never Used   Substance and Sexual Activity    Alcohol use: Never    Drug use: Never    Sexual activity: Not Currently     Partners: Female   Other Topics Concern    Caffeine Concern Yes    Stress Concern No    Weight Concern No    Special Diet No    Exercise No    Seat Belt No   Social History Narrative    Lives with life partner    Has 3 daughters - 1 lives closeby, 1 in [place], 1 in [place]     Social Determinants of Health     Financial Resource Strain: Low Risk  (5/31/2024)    Received from [facility]    Overall Financial Resource Strain (CARDIA)     Difficulty of Paying Living Expenses: Not hard at all   Food Insecurity: No Food Insecurity (9/15/2024)    Food Insecurity     Food Insecurity: Never true   Transportation Needs: No Transportation Needs (9/15/2024)    Transportation Needs     Lack of Transportation: No   Housing Stability: Low Risk  (9/15/2024)    Housing Stability     Housing Instability: No     Housing Instability Emergency: No              Review of Systems    Positive for stated Chief Complaint: Pain    Other systems are as noted in HPI.  Constitutional and vital signs reviewed.      All other systems reviewed and negative except as noted above.    Physical Exam     ED Triage Vitals [09/30/24 0559]   /77   Pulse 66   Resp 22   Temp 98.8 °F (37.1 °C)   Temp src Oral   SpO2 94 %   O2 Device None (Room air)       Current Vitals:   Vital Signs  BP: 135/84  Pulse: 75  Resp: 18  Temp: 98.8 °F (37.1 °C)  Temp src: Oral    Oxygen Therapy  SpO2: 93 %  O2 Device: None (Room air)            Physical Exam    General: Alert, oriented, pale  HEENT:  Pupils equal reactive.  Extraocular motions intact. MMM.  Neck: Supple  Lungs: Clear to auscultation bilaterally.  Heart: Regular rate and rhythm.  Abdomen: Soft, right upper quadrant tenderness  Skin: No rash.  1+ bilateral pitting edema  Neurologic: No focal neurologic  deficits.  Normal speech pattern  Musculoskeletal: No tenderness or deformity noted.  Full range of motion throughout.        ED Course     Labs Reviewed   COMP METABOLIC PANEL (14) - Abnormal; Notable for the following components:       Result Value    Glucose 131 (*)     Sodium 135 (*)     Potassium 2.6 (*)     Creatinine 0.56 (*)     Calcium, Total 8.1 (*)     Alkaline Phosphatase 172 (*)     Total Protein 5.6 (*)     Albumin 1.9 (*)     A/G Ratio 0.5 (*)     All other components within normal limits   CBC WITH DIFFERENTIAL WITH PLATELET - Abnormal; Notable for the following components:    WBC 11.9 (*)     RBC 3.19 (*)     HGB 9.9 (*)     HCT 29.9 (*)     Neutrophil Absolute Prelim 10.76 (*)     Neutrophil Absolute 10.76 (*)     Lymphocyte Absolute 0.29 (*)     All other components within normal limits   LIPASE - Normal   SCAN SLIDE   LACTIC ACID, PLASMA   RAINBOW DRAW LAVENDER   RAINBOW DRAW LIGHT GREEN   RAINBOW DRAW BLUE   BLOOD CULTURE   BLOOD CULTURE        Electrocardiogram as interpreted by this provider shows normal sinus rhythm, rate 65, no acute appearing ST elevation or depression.  Rate, axis and intervals as noted on the printed ECG report.                MDM        Admission disposition: 9/30/2024  9:15 AM       Differential diagnosis includes pancreatitis, biliary obstruction, breakthrough PE    CBC with a mild anemia which is at baseline.    Chemistry with normal renal function but low potassium 2.6.  LFTs are improved.  Total bili is normal    Lipase is normal           CTA CHEST + CT ABD (W) + CT PEL (W) (CPT=71275/83522)    Result Date: 9/30/2024  PROCEDURE:  CTA CHEST + CT ABD (W) + CT PEL (W) (CPT=71275/24493)  COMPARISON:  [facility] , CT, CTA CHEST + CT ABD (W) + CT PEL (W) (CPT=71275/52273), 9/15/2024, 2:24 PM.  INDICATIONS:  53-year-old male with history of metastatic a esophageal cancer presents with severe right lower chest/rib pain and right upper quadrant abdominal pain.  TECHNIQUE:   CT of the chest (with CTA imaging), abdomen, and pelvis were obtained post- injection of non-ionic intravenous contrast material. Multi-planar reformatted/3-D images were created to optimize visualization of vascular anatomy.  Dose reduction techniques were used. Dose information is transmitted to the ACR (American College of Radiology) NRDR (National Radiology Data Registry) which includes the Dose Index Registry.  PATIENT STATED HISTORY:(As transcribed by Technologist)  Patient complains of right lower rib pain / upper right abdomen pain.   CONTRAST USED:  100cc of Isovue 370  FINDINGS:   CHEST:  LUNGS/PLEURA:  There has been interval development of diffuse patchy ground-glass type airspace opacities involving bilateral upper lobes, and left lower lobe.  There is a mild right pleural effusion versus pleural thickening/pleural reaction which is slightly increased from prior imaging. MEDIASTINUM:  No mass or adenopathy.  Patient is status post esophagectomy with gastric pull-through procedure, appearing stable from prior imaging. AIDAN:  No mass or adenopathy.  CARDIAC:  Severe coronary artery atherosclerotic changes, stable from prior imaging. CHEST WALL:  No mass or axillary adenopathy.  AORTA:  Stable aortic ectasia without shellie aneurysm.  There is a aberrant right subclavian artery which emanates from the mid to posterior arch and extends through the posterior aspect of the mediastinum, posterior to the gastric pull-through. VASCULATURE:  No visible pulmonary arterial thrombus or attenuation.   ABDOMEN/PELVIS: LIVER:  Intrahepatic metastatic lesions are again identified and are stable.  The segment 7 lesion currently measures 4.0 x 3.2 cm, previously measuring 4.2 x 2.9 cm.  Segment 8 lesion currently measures 3.5 x 3.4 cm, previously measuring 3.5 x 3.6 cm.  Third lesion identified the junction of segments 5 and 8 currently measures 2.7 x 2.4 cm, previously measuring 2.6 x 2.6 cm.  No new hepatic lesions.   BILIARY:  There has been interval placement of a CBD stent with a moderate degree of pneumobilia.  There has been interval development of moderate gallbladder distension with diffuse pericholecystic fat stranding and fluid.  Associated gallbladder wall thickening is noted.  No gallstones noted. PANCREAS:  Stable pancreatic mass lesions, including a 2.8 x 3.5 cm lesion along the body and a 0.9 x 1.0 cm lesion along the head, anteromedial to the CBD stent. SPLEEN:  No enlargement or focal lesion.  KIDNEYS:  No mass, obstruction, or calcification.  ADRENALS:  No mass or enlargement.  AORTA:  No aneurysm or dissection.  RETROPERITONEUM:  No mass or adenopathy.  BOWEL/MESENTERY:  There has been interval development of mild to moderate intra-abdominal ascites.  Small bowel is decompressed and unremarkable.  There has been interval development of colonic wall thickening extending from the transverse colon.  There is also been interval development of diffuse haziness/stranding to the mesentery and omentum, perhaps relating to the presence of ascites. ABDOMINAL WALL:  There has been interval development of diffuse fat stranding/edema to the subcutaneous tissues of the chest wall, abdomen and flank regions extending into the subcutaneous tissues of the pelvis and thighs. URINARY BLADDER:  No visible focal wall thickening, lesion, or calculus.  PELVIC NODES:  No adenopathy.  PELVIC ORGANS:  No visible mass.  Pelvic organs appropriate for patient age.  BONES:  No bony lesion or fracture.            CONCLUSION:  There has been interval CBD stent placement.  Additionally, in the interim from the previous exam of 9/15/2024, there has been development of diffuse patchy ground-glass opacities within the lungs bilaterally, mild to moderate diffuse intra-abdominal ascites with mesenteric and omental stranding diffusely as well as the development of subcutaneous edematous changes/stranding involving the entire visualized subcutaneous  tissues of the chest, abdomen, pelvis and upper thighs.  Additionally, there has been interval increased gallbladder distension with mild gallbladder wall thickening and diffuse pericholecystic fluid and fat stranding.  There is also interval development of colonic wall thickening involving the transverse to rectosigmoid colon.  Given the multiple interval changes, the appearance would suggest an acute, diffuse inflammatory process.  The previously identified metastatic lesions of the liver and pancreas appear relatively stable.  No pulmonary emboli noted.    LOCATION:  [id number]   Dictated by (CST): Joseph Mcdaniels DO on 9/30/2024 at 8:10 AM     Finalized by (CST): Joseph Mcdaniels DO on 9/30/2024 at 8:36 AM        Medications   HYDROmorphone (Dilaudid) 1 MG/ML injection 0.5 mg (0.5 mg Intravenous Given 9/30/24 0915)   potassium chloride 20 mEq/100mL IVPB premix 20 mEq (20 mEq Intravenous New Bag 9/30/24 0819)   potassium chloride (Klor-Con) 20 MEQ oral powder 20 mEq (20 mEq Oral Not Given 9/30/24 0819)   piperacillin-tazobactam (Zosyn) 4.5 g in dextrose 5% 100 mL IVPB-ADDV (0 g Intravenous Hold 9/30/24 0847)   ondansetron (Zofran) 4 MG/2ML injection 4 mg (4 mg Intravenous Given 9/30/24 0714)   iopamidol 76% (ISOVUE-370) injection for power injector (100 mL Intravenous Given 9/30/24 0741)       Spoke with Dr Foster regarding CT findings.  Recommends antibiotic coverage and admission     Spoke with Dr Vergara for admission       Medical Decision Making  Amount and/or Complexity of Data Reviewed  Independent Historian: spouse  External Data Reviewed: notes.     Details: Recent discharge summary from 9/20        Disposition and Plan     Clinical Impression:  1. Abdominal pain, acute    2. Hypokalemia         Disposition:  Admit  9/30/2024  9:15 am    Follow-up:  No follow-up provider specified.        Medications Prescribed:  Current Discharge Medication List                            Hospital Problems       Present on  Admission  Date Reviewed: 9/24/2024            ICD-10-CM Noted POA    * (Principal) Abdominal pain, acute R10.9 9/30/2024 Unknown

## 2024-09-30 NOTE — H&P
[facility]     Dontae Cortez Jr. Patient Status:  Inpatient    10/15/1970 MRN [id number]   Location [facility] 4NW-A Attending Nixon Vergara MD   Hosp Day # 0 PCP Adrian Horowitz MD     Chief Complaint: Abdominal pain    Subjective:    History of Present Illness:     Dontae Cortez Jr. is a 53 year old male with Dontae Cortez Jr. is a 53 year old male with past medical history significant for metastatic esophageal cancer, CAD with prev CABG, HTN, DL, GERD/PUD, depression/anxiety presents to the ER with c/o abdominal pain.  Pt was recently admitted from 9/15- with abdominal pain and found to have elevated LFTs due to biliary obstruction.  He underwent ERCP with sphincterotomy and CBD/PD stent placement on .  He presents today with c/o diffuse abdominal pain similar to his previous pain.  Despite taking his home pain medications, his pain persisted.  He also c/o worsening LE swelling.  He denies fever, chills, nausea, vomiting, or diarrhea.    History/Other:    Past Medical History:  Past Medical History:    Back problem    Belching    Black stools    Borderline diabetes    Dx in 2013 - HgA1C 6.2%    C. difficile diarrhea    pt treated and without symptoms    Chest pain    Coronary artery disease    On 13: CABG x 4 with LIMA to LAD and SVG to diagonal, OM and PDA    Decorative tattoo    Depression    Difficult intubation    h/o esophagectomy for CA; developed esophagobronchial vistula    Disorder of liver    LIVER CA    Esophageal cancer (HCC)    completed chemo    Esophageal reflux    Essential hypertension    Exposure to medical diagnostic radiation    last tx 2022    Frequent urination    Gastroparesis    Heartburn    High blood pressure    High cholesterol    Found when I had quadruple bypass    History of COVID-19    asymptomatic - pt was dx during a hospitalization for another diagnosis. No continued symptoms    History of stomach  ulcers    Hyperlipidemia    Hyperlipidemia LDL goal < 70    Indigestion    Morbid obesity with BMI of 40.0-44.9, adult (HCC)    Muscle weakness    Nausea vomiting and diarrhea    Nontoxic multinodular goiter    Dx in 8/2013: pt was told that imaging showed thyroid cysts per PCP    Pancreatic cancer (HCC)    last dose 12/4/2023 is scheduled for another round 12/27/23    Peripheral vascular disease (HCC)    pt denies    Personal history of antineoplastic chemotherapy    for esophageal cancer/completed    Personal history of antineoplastic chemotherapy    pancreatic cancer    Personal history of antineoplastic chemotherapy    Last treatment 3/12/24    Problems with swallowing    Pulmonary nodules    Dx in 8/2013: CT chest showed small bilateral fissural-based lung nodules less than 1 cm    S/P CABG x 4    On 8/16/13: CABG x 4 with LIMA to LAD and SVG to diagonal, OM and PDA    Shortness of breath    when coughing; no oxygen    Vomiting     Past Surgical History:   Past Surgical History:   Procedure Laterality Date    Appendectomy      Appendectomy      Arthroscopy of joint unlisted      right shoulder    Cabg      On 8/16/13: CABG x 4 with LIMA to LAD and SVG to diagonal, OM and PDA    Cardiac cath lab      On 8/14/2013: cardiac cath showed 3-vessel disease    Other surgical history      1.       Laparoscopic robotic-assisted esophagogastrectomy.    Port, indwelling, imp        Family History:   Family History   Problem Relation Age of Onset    Cancer Mother         breast and colon     Diabetes Neg      Social History:    reports that he quit smoking about 11 years ago. His smoking use included cigarettes. He started smoking about 38 years ago. He has a 27 pack-year smoking history. He has never been exposed to tobacco smoke. He has never used smokeless tobacco. He reports that he does not drink alcohol and does not use drugs.     Allergies:   Allergies   Allergen Reactions    Oxaliplatin HIVES     Shaking         Medications:    Current Facility-Administered Medications on File Prior to Encounter   Medication Dose Route Frequency Provider Last Rate Last Admin    [COMPLETED] ondansetron (Zofran) 16 mg, dexAMETHasone (Decadron) 20 mg in sodium chloride 0.9% 110 mL IVPB   Intravenous Once Senia Foster MD   Stopped at 24 1039    [COMPLETED] diphenhydrAMINE (Benadryl) 50 mg/mL  injection 25 mg  25 mg Intravenous Once Senia Foster MD   25 mg at 24 1018    [COMPLETED] famotidine (Pepcid) 20 mg/2mL injection 20 mg  20 mg Intravenous Once Senia Foster MD   20 mg at 24 1020    [COMPLETED] PACLitaxel (Taxol) 156 mg in sodium chloride 0.9% 276 mL infusion  80 mg/m2 (Order-Specific) Intravenous Once Senia Foster MD   Stopped at 24 1215    [COMPLETED] magnesium oxide (Mag-Ox) tab 400 mg  400 mg Oral Once Ghamyi Quinton, DO   400 mg at 24 0839    [COMPLETED] magnesium oxide (Mag-Ox) tab 400 mg  400 mg Oral Once Ghamyi Quinton, DO   400 mg at 24 0845    [COMPLETED] potassium chloride (Klor-Con M20) tab 40 mEq  40 mEq Oral Once Ghelani Quinton, DO   40 mEq at 24 0845    [COMPLETED] magnesium oxide (Mag-Ox) tab 400 mg  400 mg Oral Once Jan Brito MD   400 mg at 24 0854    [] indomethacin (Indocin) 100 MG rectal suppository             [COMPLETED] magnesium oxide (Mag-Ox) tab 400 mg  400 mg Oral Once Jan Brito MD   400 mg at 24 0815    [COMPLETED] HYDROmorphone (Dilaudid) 1 MG/ML injection 0.5 mg  0.5 mg Intravenous Q30 Min PRN Vishal Harrison MD   0.5 mg at 09/15/24 1536    [COMPLETED] sodium chloride 0.9 % IV bolus 500 mL  500 mL Intravenous Once Vishal Harrison MD   Stopped at 09/15/24 1410    [COMPLETED] iopamidol 76% (ISOVUE-370) injection for power injector  100 mL Intravenous ONCE PRN Vishal Harrison MD   100 mL at 09/15/24 1450    [COMPLETED] piperacillin-tazobactam (Zosyn) 4.5 g in dextrose 5% 100 mL IVPB-ADDV  4.5 g Intravenous Once Vsihal Harrison MD    Stopped at 09/15/24 1648    [] midazolam (Versed) 2 MG/2ML injection 1 mg  1 mg Intravenous Q5 Min PRN Jenny Navarrete MD   1 mg at 09/10/24 1104    [] fentaNYL (Sublimaze) 50 mcg/mL injection 50 mcg  50 mcg Intravenous Q5 Min PRN Jenny Navarrete MD   50 mcg at 24 1605    [COMPLETED] heparin (Porcine) 100 Units/mL lock flush 500 Units  5 mL Intracatheter Once Cuong Wan MD   500 Units at 09/10/24 1415    [COMPLETED] heparin (Porcine) 100 Units/mL lock flush 500 Units  5 mL Intracatheter Once Senia Foster MD   500 Units at 24 0845    [COMPLETED] ondansetron (Zofran) 16 mg, dexAMETHasone (Decadron) 20 mg in sodium chloride 0.9% 110 mL IVPB   Intravenous Once Grecia Angelo APRN   Stopped at 24 1148    [COMPLETED] nivolumab (Opdivo) 240 mg in sodium chloride 0.9% 124 mL IVPB  240 mg Intravenous Once Grecia Angelo APRN   Stopped at 24 1047    [COMPLETED] trastuzumab-qyyp (Trazimera) 294 mg in sodium chloride 0.9% 264 mL infusion  4 mg/kg (Treatment Plan Recorded) Intravenous Once Grecia Angelo APRN   Stopped at 24 1124    [COMPLETED] leucovorin 800 mg in sodium chloride 0.9 % 250 mL infusion  400 mg/m2 (Treatment Plan Recorded) Intravenous Once Grecia Angelo APRN   Stopped at 24 1225    [COMPLETED] famotidine (Pepcid) 20 mg/2mL injection 20 mg  20 mg Intravenous Once Grecia Angelo APRN   20 mg at 24 1153    [COMPLETED] heparin (Porcine) 100 Units/mL lock flush 500 Units  5 mL Intracatheter Once Senia oFster MD   500 Units at 24 0810    [COMPLETED] methylPREDNISolone sodium succinate (Solu-MEDROL) injection 125 mg  125 mg Intravenous Once Senia Foster MD   125 mg at 24 1126    [COMPLETED] diphenhydrAMINE (Benadryl) 50 mg/mL  injection 25 mg  25 mg Intravenous Once Senia Foster MD   25 mg at 24 1110    [COMPLETED] famotidine (Pepcid) 20 mg/2mL injection 20 mg  20 mg Intravenous Once Senia Foster MD   20 mg  at 07/29/24 1126    [COMPLETED] ondansetron (Zofran) 16 mg, dexAMETHasone (Decadron) 20 mg in sodium chloride 0.9% 110 mL IVPB   Intravenous Once Senia Foster MD   Stopped at 07/29/24 1123    [COMPLETED] nivolumab (Opdivo) 240 mg in sodium chloride 0.9% 124 mL IVPB  240 mg Intravenous Once Senia Foster MD   Stopped at 07/29/24 1029    [COMPLETED] trastuzumab-qyyp (Trazimera) 294 mg in sodium chloride 0.9% 264 mL infusion  4 mg/kg (Treatment Plan Recorded) Intravenous Once Senia Foster MD   Stopped at 07/29/24 1102    [COMPLETED] oxaliplatin (Eloxatin) 150 mg in dextrose 5% 280 mL infusion  75 mg/m2 (Treatment Plan Recorded) Intravenous Once Senia Foster MD   Stopped at 07/29/24 1327    [COMPLETED] leucovorin 800 mg in dextrose 5% 250 mL infusion  400 mg/m2 (Treatment Plan Recorded) Intravenous Once Senia Foster MD   Stopped at 07/29/24 1327    [COMPLETED] famotidine (Pepcid) 20 mg/2mL injection 20 mg  20 mg Intravenous Once Grecia Angelo APRN   20 mg at 07/29/24 1314    [COMPLETED] prochlorperazine (Compazine) tab 10 mg  10 mg Oral Once Grecia Angelo APRN   10 mg at 07/29/24 1314    [COMPLETED] diphenhydrAMINE (Benadryl) 50 mg/mL  injection 25 mg  25 mg Intravenous Once Grecia Angelo APRN   25 mg at 07/29/24 1330    [COMPLETED] methylPREDNISolone sodium succinate (Solu-MEDROL) injection 125 mg  125 mg Intravenous Once Grecia Angelo APRN   125 mg at 07/15/24 0931    [COMPLETED] diphenhydrAMINE (Benadryl) 50 mg/mL  injection 25 mg  25 mg Intravenous Once Grecia Angelo APRN   25 mg at 07/15/24 0927    [COMPLETED] famotidine (Pepcid) 20 mg/2mL injection 20 mg  20 mg Intravenous Once Grecia Angelo APRN   20 mg at 07/15/24 0925    [COMPLETED] ondansetron (Zofran) 16 mg, dexAMETHasone (Decadron) 20 mg in sodium chloride 0.9% 110 mL IVPB   Intravenous Once Grecia Angelo APRN   Stopped at 07/15/24 1054    [COMPLETED] nivolumab (Opdivo) 240 mg in sodium chloride 0.9% 124 mL IVPB  240  mg Intravenous Once Grecia Angelo APRN   Stopped at 07/15/24 1004    [COMPLETED] trastuzumab-qyyp (Trazimera) 294 mg in sodium chloride 0.9% 264 mL infusion  4 mg/kg (Treatment Plan Recorded) Intravenous Once Grecia Angelo APRN   Stopped at 07/15/24 1035    [COMPLETED] oxaliplatin (Eloxatin) 150 mg in dextrose 5% 280 mL infusion  75 mg/m2 (Treatment Plan Recorded) Intravenous Once Grecia Angelo APRN   Stopped at 07/15/24 1230    [COMPLETED] leucovorin 800 mg in dextrose 5% 250 mL infusion  400 mg/m2 (Treatment Plan Recorded) Intravenous Once Grecia Angelo APRN   Stopped at 07/15/24 1230    [COMPLETED] diphenhydrAMINE (Benadryl) 50 mg/mL  injection 25 mg  25 mg Intravenous Once Grecia Angelo APRN   25 mg at 07/15/24 1220    [COMPLETED] diphenhydrAMINE (Benadryl) 50 mg/mL  injection 25 mg  25 mg Intravenous Once Grecia Angelo APRN   25 mg at 07/15/24 1230    [COMPLETED] famotidine (Pepcid) 20 mg/2mL injection 20 mg  20 mg Intravenous Once Grecia Angelo APRN   20 mg at 07/15/24 1253    [COMPLETED] atropine sulfate 0.4 mg/mL injection 0.2 mg  0.2 mg Intravenous Once Grecia Angelo APRN   0.2 mg at 07/15/24 1255     Current Outpatient Medications on File Prior to Encounter   Medication Sig Dispense Refill    DICYCLOMINE 10 MG Oral Cap TAKE 1 CAPSULE BY MOUTH 4 TIMES DAILY BEFORE MEALS AND NIGHTLY. 90 capsule 1    Potassium Chloride ER 20 MEQ Oral Tab CR Take 1 tablet by mouth daily. 30 tablet 1    baclofen 10 MG Oral Tab Take 1 tablet (10 mg total) by mouth 3 (three) times daily. 30 tablet 0    morphINE ER 30 MG Oral Tab CR Take 1 tablet (30 mg total) by mouth every 12 (twelve) hours. 60 tablet 0    oxyCODONE 10 MG Oral Tab Take 1 tablet (10 mg total) by mouth every 4 (four) hours as needed (for cancer related pain). 180 tablet 0    pregabalin 25 MG Oral Cap Take 1 capsule (25 mg total) by mouth 2 (two) times daily. 60 capsule 0    sertraline 100 MG Oral Tab Take 2 tablets (200 mg total) by mouth  daily.      losartan 100 MG Oral Tab Take 1 tablet (100 mg total) by mouth daily. 30 tablet 0    OLANZapine 5 MG Oral Tab Take 1 tablet (5 mg total) by mouth nightly.      prochlorperazine (COMPAZINE) 10 mg tablet Take 1 tablet (10 mg total) by mouth every 6 (six) hours as needed for Nausea.      pantoprazole 40 MG Oral Tab EC Take 1 tablet (40 mg total) by mouth before breakfast. 30 tablet 0    metoprolol tartrate 25 MG Oral Tab Take 1 tablet (25 mg total) by mouth 2x Daily(Beta Blocker). 60 tablet 1    ondansetron 8 MG Oral Tablet Dispersible Take 1 tablet (8 mg total) by mouth every 8 (eight) hours as needed for Nausea. 30 tablet 0    furosemide 20 MG Oral Tab Take 1 tablet (20 mg total) by mouth daily. 30 tablet 1    apixaban 5 MG Oral Tab Take 1 tablet (5 mg total) by mouth 2 (two) times daily. 60 tablet 2       Review of Systems:   A comprehensive review of systems was completed.    Pertinent positives and negatives noted in the HPI.    Objective:   Physical Exam:    /88 (BP Location: Right arm)   Pulse 80   Temp 97.6 °F (36.4 °C) (Oral)   Resp 18   Ht 6' 2\" (1.88 m)   Wt 156 lb 1.4 oz (70.8 kg)   SpO2 96%   BMI 20.04 kg/m²   General: No acute distress, Alert  Respiratory: No rhonchi, no wheezes  Cardiovascular: S1, S2. Regular rate and rhythm  Abdomen: Soft, Non-tender, non-distended, positive bowel sounds  Neuro: No new focal deficits  Extremities: No edema      Results:    Labs:      Labs Last 24 Hours:    Recent Labs   Lab 09/30/24  0637   RBC 3.19*   HGB 9.9*   HCT 29.9*   MCV 93.7   MCH 31.0   MCHC 33.1   RDW 14.3   NEPRELIM 10.76*   WBC 11.9*   .0       Recent Labs   Lab 09/30/24  0637   *   BUN 13   CREATSERUM 0.56*   EGFRCR 117   CA 8.1*   ALB 1.9*   *   K 2.6*      CO2 27.0   ALKPHO 172*   AST 27   ALT 37   BILT 1.3   TP 5.6*       Lab Results   Component Value Date    PT 20.4 (H) 01/30/2014    PT 22.9 (H) 01/13/2014    PT 25.7 (H) 01/08/2014    INR 1.17  09/10/2024    INR 1.35 (H) 08/29/2024    INR 1.13 04/16/2024       No results for input(s): \"TROP\", \"TROPHS\", \"CK\" in the last 168 hours.    No results for input(s): \"TROP\", \"PBNP\" in the last 168 hours.    No results for input(s): \"PCT\" in the last 168 hours.    Imaging: Imaging data reviewed in Epic.    Assessment & Plan:      #Abdominal pain  CT a/p reviewed c/w diffuse inflammation  ESR, CRP elevated  Possible related to immunotherapy, ?trial of steroids  Empiric abx  Palliative care to eval  GI to eval    #Metastatic esophageal cancer  Per oncology     #H/o PE/DVT  Eliquis    #LE swelling, likely 3rd spacing    #Hypokalemia, replace PRN    #Cancer related pain, per palliative  ?celiac block was considered during last admission    #HTN, controlled    #DL, statin    #PUD/GERD, PPi    #CAD with prev CABG    #Depression/anxiety, home meds resumed          Plan of care discussed with pt and RN.    Megan Arshad MD    Supplementary Documentation:     The 21st Century Cures Act makes medical notes like these available to patients in the interest of transparency. Please be advised this is a medical document. Medical documents are intended to carry relevant information, facts as evident, and the clinical opinion of the practitioner. The medical note is intended as peer to peer communication and may appear blunt or direct. It is written in medical language and may contain abbreviations or verbiage that are unfamiliar.

## 2024-09-30 NOTE — ED QUICK NOTES
Orders for admission, patient is aware of plan and ready to go upstairs. Any questions, please call ED RN lanie at extension [phone].     Patient Covid vaccination status: Fully vaccinated and immunocompromised     COVID Test Ordered in ED: None    COVID Suspicion at Admission: N/A    Running Infusions:      Mental Status/LOC at time of transport: A&Ox3    Other pertinent information:   CIWA score: N/A   NIH score:  N/A

## 2024-10-01 ENCOUNTER — APPOINTMENT (OUTPATIENT)
Dept: GENERAL RADIOLOGY | Facility: HOSPITAL | Age: 54
End: 2024-10-01
Attending: HOSPITALIST
Payer: COMMERCIAL

## 2024-10-01 ENCOUNTER — APPOINTMENT (OUTPATIENT)
Dept: ULTRASOUND IMAGING | Facility: HOSPITAL | Age: 54
End: 2024-10-01
Attending: EMERGENCY MEDICINE
Payer: COMMERCIAL

## 2024-10-01 PROBLEM — R93.89 ABNORMAL FINDING OF DIAGNOSTIC IMAGING: Status: ACTIVE | Noted: 2024-01-01

## 2024-10-01 PROBLEM — R93.89 ABNORMAL FINDING OF DIAGNOSTIC IMAGING: Status: ACTIVE | Noted: 2024-10-01

## 2024-10-01 LAB
ALBUMIN SERPL-MCNC: 2.9 G/DL (ref 3.2–4.8)
ALBUMIN/GLOB SERPL: 1.2 {RATIO} (ref 1–2)
ALP LIVER SERPL-CCNC: 159 U/L
ALT SERPL-CCNC: 30 U/L
AMMONIA PLAS-MCNC: 35 UMOL/L (ref 11–32)
ANION GAP SERPL CALC-SCNC: 6 MMOL/L (ref 0–18)
ARTERIAL PATENCY WRIST A: POSITIVE
AST SERPL-CCNC: 21 U/L (ref ?–34)
BASE EXCESS BLDA CALC-SCNC: 1.6 MMOL/L (ref ?–2)
BASOPHILS # BLD AUTO: 0.02 X10(3) UL (ref 0–0.2)
BASOPHILS NFR BLD AUTO: 0.2 %
BILIRUB SERPL-MCNC: 1.3 MG/DL (ref 0.3–1.2)
BLACTX-M ISLT/SPM QL: NOT DETECTED
BODY TEMPERATURE: 98.6 F
BUN BLD-MCNC: 21 MG/DL (ref 9–23)
CA-I BLD-SCNC: 1.24 MMOL/L (ref 0.95–1.32)
CALCIUM BLD-MCNC: 8.4 MG/DL (ref 8.7–10.4)
CHLORIDE SERPL-SCNC: 104 MMOL/L (ref 98–112)
CO2 SERPL-SCNC: 26 MMOL/L (ref 21–32)
COHGB MFR BLD: 1 % SAT (ref 0–3)
CREAT BLD-MCNC: 1.41 MG/DL
EGFRCR SERPLBLD CKD-EPI 2021: 59 ML/MIN/1.73M2 (ref 60–?)
EOSINOPHIL # BLD AUTO: 0 X10(3) UL (ref 0–0.7)
EOSINOPHIL NFR BLD AUTO: 0 %
ERYTHROCYTE [DISTWIDTH] IN BLOOD BY AUTOMATED COUNT: 14.6 %
GLOBULIN PLAS-MCNC: 2.4 G/DL (ref 2–3.5)
GLUCOSE BLD-MCNC: 125 MG/DL (ref 70–99)
HCO3 BLDA-SCNC: 26.1 MEQ/L (ref 21–27)
HCT VFR BLD AUTO: 27.8 %
HGB BLD-MCNC: 8.6 G/DL
HGB BLD-MCNC: 9.4 G/DL
IMM GRANULOCYTES # BLD AUTO: 0.26 X10(3) UL (ref 0–1)
IMM GRANULOCYTES NFR BLD: 2 %
INR BLD: 4.19 (ref 0.8–1.2)
K PNEUMON DNA BLD POS QL NAA+NON-PROBE: DETECTED
L/M: 2 L/MIN
LACTATE BLD-SCNC: 1.1 MMOL/L (ref 0.5–2)
LYMPHOCYTES # BLD AUTO: 0.36 X10(3) UL (ref 1–4)
LYMPHOCYTES NFR BLD AUTO: 2.7 %
MCH RBC QN AUTO: 30.6 PG (ref 26–34)
MCHC RBC AUTO-ENTMCNC: 33.8 G/DL (ref 31–37)
MCV RBC AUTO: 90.6 FL
METHGB MFR BLD: 0 % SAT (ref 0.4–1.5)
MONOCYTES # BLD AUTO: 1.1 X10(3) UL (ref 0.1–1)
MONOCYTES NFR BLD AUTO: 8.3 %
NEUTROPHILS # BLD AUTO: 11.48 X10 (3) UL (ref 1.5–7.7)
NEUTROPHILS # BLD AUTO: 11.48 X10(3) UL (ref 1.5–7.7)
NEUTROPHILS NFR BLD AUTO: 86.8 %
OSMOLALITY SERPL CALC.SUM OF ELEC: 286 MOSM/KG (ref 275–295)
OXYHGB MFR BLDA: 93.5 % (ref 92–100)
PCO2 BLDA: 43 MM HG (ref 35–45)
PH BLDA: 7.4 [PH] (ref 7.35–7.45)
PLATELET # BLD AUTO: 235 10(3)UL (ref 150–450)
PO2 BLDA: 79 MM HG (ref 80–100)
POTASSIUM BLD-SCNC: 3.9 MMOL/L (ref 3.6–5.1)
POTASSIUM SERPL-SCNC: 3.7 MMOL/L (ref 3.5–5.1)
POTASSIUM SERPL-SCNC: 3.7 MMOL/L (ref 3.5–5.1)
PROCALCITONIN SERPL-MCNC: 3.06 NG/ML (ref ?–0.05)
PROT SERPL-MCNC: 5.3 G/DL (ref 5.7–8.2)
PROTHROMBIN TIME: 40.7 SECONDS (ref 11.6–14.8)
RBC # BLD AUTO: 3.07 X10(6)UL
SODIUM BLD-SCNC: 130 MMOL/L (ref 135–145)
SODIUM SERPL-SCNC: 136 MMOL/L (ref 136–145)
WBC # BLD AUTO: 13.2 X10(3) UL (ref 4–11)

## 2024-10-01 PROCEDURE — 99233 SBSQ HOSP IP/OBS HIGH 50: CPT | Performed by: CLINICAL NURSE SPECIALIST

## 2024-10-01 PROCEDURE — 99233 SBSQ HOSP IP/OBS HIGH 50: CPT | Performed by: INTERNAL MEDICINE

## 2024-10-01 PROCEDURE — 3E043XZ INTRODUCTION OF VASOPRESSOR INTO CENTRAL VEIN, PERCUTANEOUS APPROACH: ICD-10-PCS | Performed by: HOSPITALIST

## 2024-10-01 PROCEDURE — 99233 SBSQ HOSP IP/OBS HIGH 50: CPT | Performed by: HOSPITALIST

## 2024-10-01 PROCEDURE — 71045 X-RAY EXAM CHEST 1 VIEW: CPT | Performed by: HOSPITALIST

## 2024-10-01 PROCEDURE — 93975 VASCULAR STUDY: CPT | Performed by: EMERGENCY MEDICINE

## 2024-10-01 RX ORDER — VANCOMYCIN HYDROCHLORIDE 125 MG/1
125 CAPSULE ORAL DAILY
Status: DISCONTINUED | OUTPATIENT
Start: 2024-10-01 | End: 2024-10-12

## 2024-10-01 RX ORDER — OXYCODONE HYDROCHLORIDE 10 MG/1
10 TABLET ORAL EVERY 6 HOURS
Status: DISCONTINUED | OUTPATIENT
Start: 2024-10-01 | End: 2024-10-02

## 2024-10-01 RX ORDER — POTASSIUM CHLORIDE 1500 MG/1
40 TABLET, EXTENDED RELEASE ORAL ONCE
Status: COMPLETED | OUTPATIENT
Start: 2024-10-01 | End: 2024-10-01

## 2024-10-01 NOTE — PLAN OF CARE
Patient is alert, c/o severe pain - PRNs per MAR. PT/INR drawn prior to IR procedure - PT 40.7, INR 4.19. Per DANY Haro in IR, INR needs to be less than 3. MD Cristian notified - order to hold eliquis and recheck PT/INR tomorrow morning. ID consult - MD Ray notified. Blood cultures drawn. 1248: BP 81/52, patient lethargic, difficult to arouse. Oxygen saturation 82-85% on room air, placed on oxygen via nasal cannula - MD Jeancarlos notified. 1328: BP 79/53, HR 79, oxygen saturation 94% on 3L - MD Jeancarlos notified, en route to room. Intensivist at bedside. Order for 500 ml bolus. Patient placed on remote telemetry. Order to transfer to ICU. Report called to DANY Luna. Family verbalized all belongings returned. X-ray at bedside, upon placing board behind patient's back, patient opened eyes and started talking. Patient educated on transfer to ICU. Patient transferred to room 473 with staff and family member at bedside.

## 2024-10-01 NOTE — PROGRESS NOTES
[facility]  Pulmonary/Critical Care Consult note    Dontae Buddy Cortez  Patient Status:  Inpatient    10/15/1970 MRN [id number]   Location [facility] 4NW-A Attending Nixon Vergara MD   Hosp Day # 1 PCP Adrian Horowitz MD       History of Present Illness:    Patient states that he is breathing okay although he has right lower abdominal pain.    History:  Past Medical History:    Back problem    Belching    Black stools    Borderline diabetes    Dx in 2013 - HgA1C 6.2%    C. difficile diarrhea    pt treated and without symptoms    Chest pain    Coronary artery disease    On 13: CABG x 4 with LIMA to LAD and SVG to diagonal, OM and PDA    Decorative tattoo    Depression    Difficult intubation    h/o esophagectomy for CA; developed esophagobronchial vistula    Disorder of liver    LIVER CA    Esophageal cancer (HCC)    completed chemo    Esophageal reflux    Essential hypertension    Exposure to medical diagnostic radiation    last tx 2022    Frequent urination    Gastroparesis    Heartburn    High blood pressure    High cholesterol    Found when I had quadruple bypass    History of COVID-19    asymptomatic - pt was dx during a hospitalization for another diagnosis. No continued symptoms    History of stomach ulcers    Hyperlipidemia    Hyperlipidemia LDL goal < 70    Indigestion    Morbid obesity with BMI of 40.0-44.9, adult (HCC)    Muscle weakness    Nausea vomiting and diarrhea    Nontoxic multinodular goiter    Dx in 2013: pt was told that imaging showed thyroid cysts per PCP    Pancreatic cancer (HCC)    last dose 2023 is scheduled for another round 23    Peripheral vascular disease (HCC)    pt denies    Personal history of antineoplastic chemotherapy    for esophageal cancer/completed    Personal history of antineoplastic chemotherapy    pancreatic cancer    Personal history of antineoplastic chemotherapy    Last treatment 3/12/24    Problems with swallowing    Pulmonary  nodules    Dx in 8/2013: CT chest showed small bilateral fissural-based lung nodules less than 1 cm    S/P CABG x 4    On 8/16/13: CABG x 4 with LIMA to LAD and SVG to diagonal, OM and PDA    Shortness of breath    when coughing; no oxygen    Vomiting     Past Surgical History:   Procedure Laterality Date    Appendectomy      Appendectomy      Arthroscopy of joint unlisted      right shoulder    Cabg      On 8/16/13: CABG x 4 with LIMA to LAD and SVG to diagonal, OM and PDA    Cardiac cath lab      On 8/14/2013: cardiac cath showed 3-vessel disease    Other surgical history      1.       Laparoscopic robotic-assisted esophagogastrectomy.    Port, indwelling, imp       Family History   Problem Relation Age of Onset    Cancer Mother         breast and colon     Diabetes Neg       reports that he quit smoking about 11 years ago. His smoking use included cigarettes. He started smoking about 38 years ago. He has a 27 pack-year smoking history. He has never been exposed to tobacco smoke. He has never used smokeless tobacco. He reports that he does not drink alcohol and does not use drugs.    Allergies:  Allergies   Allergen Reactions    Oxaliplatin HIVES     Shaking        Medications:    Current Facility-Administered Medications:     vancomycin (Vancocin) cap 125 mg, 125 mg, Oral, Daily    cefTRIAXone (Rocephin) 2 g in sodium chloride 0.9% 100 mL IVPB-ADDV, 2 g, Intravenous, Q24H    heparin (Porcine) 100 Units/mL lock flush 500 Units, 5 mL, Intravenous, PRN    apixaban (Eliquis) tab 5 mg, 5 mg, Oral, BID    baclofen (Lioresal) tab 10 mg, 10 mg, Oral, TID    dicyclomine (Bentyl) cap 10 mg, 10 mg, Oral, TID AC&HS    furosemide (Lasix) tab 20 mg, 20 mg, Oral, Daily    losartan (Cozaar) tab 100 mg, 100 mg, Oral, Daily    metoprolol tartrate (Lopressor) tab 25 mg, 25 mg, Oral, 2x Daily(Beta Blocker)    morphINE ER (MS Contin) tab 30 mg, 30 mg, Oral, 2 times per day    OLANZapine (ZyPREXA) tab 5 mg, 5 mg, Oral, Nightly     oxyCODONE immediate release tab 10 mg, 10 mg, Oral, Q4H PRN    pantoprazole (Protonix) DR tab 40 mg, 40 mg, Oral, Before breakfast    pregabalin (Lyrica) cap 25 mg, 25 mg, Oral, BID    sertraline (Zoloft) tab 200 mg, 200 mg, Oral, Daily    acetaminophen (Tylenol Extra Strength) tab 500 mg, 500 mg, Oral, Q4H PRN    melatonin tab 3 mg, 3 mg, Oral, Nightly PRN    glycerin-hypromellose- (Artificial Tears) 0.2-0.2-1 % ophthalmic solution 1 drop, 1 drop, Both Eyes, QID PRN    sodium chloride (Saline Mist) 0.65 % nasal solution 1 spray, 1 spray, Each Nare, Q3H PRN    ondansetron (Zofran) 4 MG/2ML injection 4 mg, 4 mg, Intravenous, Q6H PRN    prochlorperazine (Compazine) 10 MG/2ML injection 5 mg, 5 mg, Intravenous, Q8H PRN    HYDROmorphone (Dilaudid) 1 MG/ML injection 0.2 mg, 0.2 mg, Intravenous, Q2H PRN **OR** HYDROmorphone (Dilaudid) 1 MG/ML injection 0.4 mg, 0.4 mg, Intravenous, Q2H PRN **OR** HYDROmorphone (Dilaudid) 1 MG/ML injection 0.8 mg, 0.8 mg, Intravenous, Q2H PRN    Intake/Output:    Intake/Output Summary (Last 24 hours) at 10/1/2024 1125  Last data filed at 9/30/2024 1800  Gross per 24 hour   Intake 600 ml   Output --   Net 600 ml      Body mass index is 20.04 kg/m².    Review of Systems  Review of Systems:   A comprehensive 10 point review of systems was completed.  Pertinent positives and negatives noted in the the HPI.         Patient Vitals for the past 24 hrs:   BP Temp Temp src Pulse Resp SpO2 Height Weight   09/30/24 1132 -- -- -- -- -- -- -- 156 lb 1.4 oz (70.8 kg)   09/30/24 1100 148/88 97.6 °F (36.4 °C) Oral 80 18 96 % -- 156 lb (70.8 kg)   09/30/24 0959 (!) 139/92 97.7 °F (36.5 °C) Oral 74 18 98 % -- --   09/30/24 0921 -- -- -- -- -- 99 % -- --   09/30/24 0920 -- -- -- -- 18 (!) 89 % -- --   09/30/24 0914 135/84 -- -- 75 18 93 % -- --   09/30/24 0819 137/89 -- -- 75 18 97 % -- --   09/30/24 0713 136/77 -- -- 69 18 95 % -- --   09/30/24 0559 142/77 98.8 °F (37.1 °C) Oral 66 22 94 % 6' 2\" (1.88  m) 162 lb 0.6 oz (73.5 kg)     Vitals:    09/30/24 1700 09/30/24 1931 10/01/24 0559 10/01/24 0916   BP: 131/80 119/75 91/60 99/70   BP Location:  Left arm Left arm    Pulse: 96 77 70 79   Resp:  17 18    Temp:  98.5 °F (36.9 °C) 98.1 °F (36.7 °C)    TempSrc:  Oral Oral    SpO2:  96% 90%    Weight:       Height:             Physical Exam  Constitutional:       General: He is not in acute distress.     Appearance: Normal appearance. He is not ill-appearing or diaphoretic.   HENT:      Head: Normocephalic and atraumatic.      Nose: Nose normal. No congestion or rhinorrhea.      Mouth/Throat:      Mouth: Mucous membranes are moist.      Pharynx: Oropharynx is clear. No oropharyngeal exudate or posterior oropharyngeal erythema.   Eyes:      Extraocular Movements: Extraocular movements intact.      Pupils: Pupils are equal, round, and reactive to light.   Cardiovascular:      Rate and Rhythm: Normal rate.      Pulses: Normal pulses.      Heart sounds: Normal heart sounds. No murmur heard.  Pulmonary:      Effort: Pulmonary effort is normal. No respiratory distress.      Breath sounds: Normal breath sounds. No wheezing or rhonchi.      Comments: Diminished breath sounds in bilateral lower lobes.  Crackles in left lower lung base  Chest:      Chest wall: No tenderness.   Abdominal:      General: Abdomen is flat. Bowel sounds are normal.      Palpations: Abdomen is soft.   Musculoskeletal:         General: Normal range of motion.   Skin:     General: Skin is warm.   Neurological:      General: No focal deficit present.      Mental Status: He is alert and oriented to person, place, and time.   Psychiatric:         Mood and Affect: Mood normal.         Behavior: Behavior normal.         Thought Content: Thought content normal.         Judgment: Judgment normal.            Lab Data Review:  Recent Labs   Lab 09/30/24  0637 10/01/24  0625   WBC 11.9* 13.2*   HGB 9.9* 9.4*   HCT 29.9* 27.8*   .0 235.0       Recent Labs    Lab 09/30/24  0637 09/30/24  1616 10/01/24  0625   *  --  136   K 2.6* 3.3* 3.7  3.7     --  104   CO2 27.0  --  26.0   BUN 13  --  21   CREATSERUM 0.56*  --  1.41*   CA 8.1*  --  8.4*   ALB 1.9*  --  2.9*   ALKPHO 172*  --  159*   ALT 37  --  30   AST 27  --  21   *  --  125*     Component  Ref Range & Units 10/1/24 0625   Procalcitonin  <0.05 ng/mL 3.06 High      No results for input(s): \"MG\" in the last 168 hours.    Lab Results   Component Value Date    PHOS 2.9 03/15/2024        Recent Labs   Lab 10/01/24  0811   INR 4.19*       No results for input(s): \"ABGPHT\", \"WZEKDV7Z\", \"EDQLR5F\", \"ABGHCO3\", \"ABGBE\", \"TEMP\", \"TACOS\", \"SITE\", \"DEV\", \"THGB\" in the last 168 hours.    No results for input(s): \"TROP\", \"CKMB\" in the last 168 hours.    Cultures:   Hospital Encounter on 09/30/24   1. Blood Culture     Status: Abnormal (Preliminary result)    Collection Time: 09/30/24  9:34 AM    Specimen: Blood,peripheral   Result Value Ref Range    Blood Culture Result Positive N/A    Blood Culture Result Klebsiella pneumoniae (A) N/A    Blood Smear Positive Blood Culture (A) N/A    Blood Smear Gram Negative Rods (A) N/A           Radiology personally reviewed:  CTA CHEST + CT ABD (W) + CT PEL (W) (CPT=71275/67143)    Result Date: 9/30/2024  CONCLUSION:  There has been interval CBD stent placement.  Additionally, in the interim from the previous exam of 9/15/2024, there has been development of diffuse patchy ground-glass opacities within the lungs bilaterally, mild to moderate diffuse intra-abdominal ascites with mesenteric and omental stranding diffusely as well as the development of subcutaneous edematous changes/stranding involving the entire visualized subcutaneous tissues of the chest, abdomen, pelvis and upper thighs.  Additionally, there has been interval increased gallbladder distension with mild gallbladder wall thickening and diffuse pericholecystic fluid and fat stranding.  There is also interval  development of colonic wall thickening involving the transverse to rectosigmoid colon.  Given the multiple interval changes, the appearance would suggest an acute, diffuse inflammatory process.  The previously identified metastatic lesions of the liver and pancreas appear relatively stable.  No pulmonary emboli noted.    LOCATION:  [facility]   Dictated by (CST): Joseph Mcdaniels DO on 9/30/2024 at 8:10 AM     Finalized by (CST): Josehp Mcdaniels DO on 9/30/2024 at 8:36 AM         Patient Active Problem List   Diagnosis    Primary hypertension    Hyperlipidemia with target low density lipoprotein (LDL) cholesterol less than 70 mg/dL    Coronary artery disease involving coronary bypass graft of native heart with angina pectoris (HCC)    S/P CABG x 4    Nontoxic multinodular goiter    Pulmonary nodules    Thrombophlebitis leg    BRANDAN (generalized anxiety disorder)    Right shoulder Arthroscopy acromioplasty ,distal  clavicle resection, rotator cuff/labral debridment  Global 05/13/2021    Right bicipital tenosynovitis    Malignant neoplasm of esophagus (HCC)    Pleural effusion    Esophageal anastomotic leak    Esophageal obstruction    On total parenteral nutrition (TPN)    Mechanical complication of esophagostomy (HCC)    Abdominal pain of unknown etiology    Migration of esophageal stent    Gastroparesis    Esophageal carcinoma (HCC)    Normocytic anemia    Esophageal fistula    Subacute cough    Acquired bronchoesophageal fistula (HCC)    Pneumonia of both lower lobes due to infectious organism    Malignant neoplasm of pancreas (HCC)    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 difficile colitis    Dysphagia, unspecified  type    Dyspnea and respiratory abnormalities    Hypokalemia    Dysphagia    Narcotic drug use    History of esophageal cancer    Palliative care by specialist    Neoplasm related pain    Endobronchial mass    Hyponatremia    Thrombocytopenia (HCC)    Acute kidney injury (HCC)    Hyperglycemia    Aspiration pneumonitis (HCC)    C. difficile diarrhea    Aspiration pneumonia (HCC)    Malignant neoplasm metastatic to liver (HCC)    Hyperlipidemia    Nausea vomiting and diarrhea    Fistula, bronchoesophageal (HCC)    Iron deficiency anemia, unspecified iron deficiency anemia type    Azotemia    Palliative care encounter    Goals of care, counseling/discussion    Cancer related pain    Anemia    Fistula    Acute cough    Pneumonia of right lung due to infectious organism, unspecified part of lung    Bacteremia    Acute postoperative pain    Exocrine pancreatic insufficiency (HCC)    Hypertriglyceridemia    Acute pulmonary embolism without acute cor pulmonale, unspecified pulmonary embolism type (HCC)    Acute deep vein thrombosis (DVT) of popliteal vein of right lower extremity (HCC)    Altered mental status, unspecified altered mental status type    Transaminitis    Biliary obstruction (HCC)    Hyperbilirubinemia    Esophageal adenocarcinoma (HCC)    Cancer associated pain    Abdominal pain, acute    Abnormal CT of the chest    Lower extremity edema       Assessment:  Acute hypoxic respiratory failure: Currently on room air  Pulmonary nodules  Groundglass opacities involving by lateral upper lobes and left lower lobe peripheral: This could represent atypical bacterial infection versus viral infection versus related to chemotherapy etc.: Elevated ESR and CRP elevated WBC count with left shift.  Procalcitonin is elevated suggesting active infection  Nonocclusive pulmonary embolism and left upper lobe and left lower lobe branches of pulmonary artery  Mediastinal adenopathy with conglomeration of AP window lymph nodes 2.5 x  2.4 cm  Pancreatic cancer status postchemotherapy  Depression, liver cancer,  GERD,   Dilated esophagus with pull-through procedure previously after esophageal cancer resection  Hypertension,  Hyperlipidemia,  Morbid obesity  History of pulmonary nodules.      Plan:  Wean FiO2 off to keep oxygen saturation between 90% to 92%  Continue current antibiotics  Continue Eliquis  DVT prophylaxis: SCD boots   GI prophylaxis: ---  Will follow for further recommendations  Follow labs    Thank You for allowing me to participate in this patient's care     Dusty Brooks MD    Note to the patient: The 21st Century Cures Act makes medical notes like these available to patients in the interest of transparency. However, be advised that this is a medical document. It is intended as peer to peer communication. It is written in medical language and may contain abbreviations or verbiage that are unfamiliar. It may appear blunt or direct. Medical documents are intended to carry relevant information, facts as evident, and clinical opinion of the practitioner.      Disclaimer: Components of this note were documented using voice recognition system and are subject to errors not corrected at proofreading. Contact the author of this note for any clarifications

## 2024-10-01 NOTE — PROGRESS NOTES
[facility]   part of [facility]     Hospitalist Progress Note     Dontae Buddy Cortez . Patient Status:  Inpatient    10/15/1970 MRN [id number]   Former[facility] 4N-A Attending Nixon Vergara MD   Hosp Day # 1 PCP Adrian Horowitz MD     Chief Complaint: abdominal pain    Subjective:     Patient cont to c/o abdominal pain, at around 1 pm, pt became hypotensive and worsening respiratory distress, placed on 3 liters.    Objective:    Review of Systems:   A comprehensive review of systems was completed; pertinent positive and negatives stated in subjective.    Vital signs:  Temp:  [98.1 °F (36.7 °C)-98.5 °F (36.9 °C)] 98.1 °F (36.7 °C)  Pulse:  [70-96] 79  Resp:  [17-18] 18  BP: ()/(60-80) 99/70  SpO2:  [90 %-96 %] 90 %    Physical Exam:    General: No acute distress  Respiratory: No wheezes, no rhonchi  Cardiovascular: S1, S2, regular rate and rhythm  Abdomen: Soft, TTP, non-distended, positive bowel sounds  Neuro: No new focal deficits.   Extremities: No edema      Diagnostic Data:    Labs:  Recent Labs   Lab 24  0637 10/01/24  0625 10/01/24  0811   WBC 11.9* 13.2*  --    HGB 9.9* 9.4*  --    MCV 93.7 90.6  --    .0 235.0  --    INR  --   --  4.19*       Recent Labs   Lab 24  0637 24  1616 10/01/24  0625   *  --  125*   BUN 13  --  21   CREATSERUM 0.56*  --  1.41*   CA 8.1*  --  8.4*   ALB 1.9*  --  2.9*   *  --  136   K 2.6* 3.3* 3.7  3.7     --  104   CO2 27.0  --  26.0   ALKPHO 172*  --  159*   AST 27  --  21   ALT 37  --  30   BILT 1.3  --  1.3*   TP 5.6*  --  5.3*       Estimated Creatinine Clearance: 60 mL/min (A) (based on SCr of 1.41 mg/dL (H)).    No results for input(s): \"TROP\", \"TROPHS\", \"CK\" in the last 168 hours.    Recent Labs   Lab 10/01/24  0811   PTP 40.7*   INR 4.19*                  Microbiology    Hospital Encounter on 24   1. Blood Culture     Status: Abnormal (Preliminary result)    Collection Time: 24  9:34 AM     Specimen: Blood,peripheral   Result Value Ref Range    Blood Culture Result Positive N/A    Blood Culture Result Klebsiella pneumoniae (A) N/A    Blood Smear Positive Blood Culture (A) N/A    Blood Smear Gram Negative Rods (A) N/A         Imaging: Reviewed in Epic.    Medications:    vancomycin  125 mg Oral Daily    cefTRIAXone  2 g Intravenous Q24H    apixaban  5 mg Oral BID    baclofen  10 mg Oral TID    dicyclomine  10 mg Oral TID AC&HS    furosemide  20 mg Oral Daily    losartan  100 mg Oral Daily    metoprolol tartrate  25 mg Oral 2x Daily(Beta Blocker)    morphINE ER  30 mg Oral 2 times per day    OLANZapine  5 mg Oral Nightly    pantoprazole  40 mg Oral Before breakfast    pregabalin  25 mg Oral BID    sertraline  200 mg Oral Daily       Assessment & Plan:      #Abdominal pain  CT a/p reviewed c/w diffuse inflammation  ESR, CRP elevated  Possible related to immunotherapy, ?trial of steroids  Empiric abx  Palliative care to eval  US abdomen pending  US paracentesis when INR improves  GI to eval     #Metastatic esophageal cancer  Per oncology      #H/o PE/DVT  Eliquis     #LE swelling, likely 3rd spacing, hypoalbuminemia     #Hypokalemia, replace PRN     #Cancer related pain, per palliative  ?celiac block was considered during last admission     #HTN, controlled     #DL, statin    #PUD/GERD, PPi     #CAD with prev CABG    #Depression/anxiety, home meds resumed    #TREVOR, due to above, ATN, start gentle hydration/albumin, monitor    #Coagulopathy, ?liver mets  Monitor INR    #Leukocytosis, WBC worse today               Megan Arshad MD    Supplementary Documentation:     Quality:  DVT Mechanical Prophylaxis:   SCDs, Early ambuation  DVT Pharmacologic Prophylaxis   Medication    heparin (Porcine) 100 Units/mL lock flush 500 Units    apixaban (Eliquis) tab 5 mg                Code Status: Full Code  Neumann: No urinary catheter in place  Neumann Duration (in days):   Central line:    SHUBHAM:     Discharge is dependent  on: progress  At this point Mr. Cortez is expected to be discharge to: home    The 21st Century Cures Act makes medical notes like these available to patients in the interest of transparency. Please be advised this is a medical document. Medical documents are intended to carry relevant information, facts as evident, and the clinical opinion of the practitioner. The medical note is intended as peer to peer communication and may appear blunt or direct. It is written in medical language and may contain abbreviations or verbiage that are unfamiliar.

## 2024-10-01 NOTE — PROGRESS NOTES
Gastroenterology Progress Note  Dontae Petersonriniesha Cortez Jr. Patient Status:  Inpatient    10/15/1970 MRN [id number]   [facility] 4NW-A Attending Nixon Vergara MD   Hosp Day # 1 PCP Adrian Horowitz MD     Chief Complaint: Abd pain  S: Pt currently lethargic--plan for MR brain. Discussed with pt's wife at bedside who states pt's pain was primarily right sided and severe in nature--the worst it has ever been. Pt with hallucinations last month which resolved with a change in his pain regimen    O: BP 99/70   Pulse 79   Temp 98.1 °F (36.7 °C) (Oral)   Resp 18   Ht 6' 2\" (1.88 m)   Wt 156 lb 1.4 oz (70.8 kg)   SpO2 90%   BMI 20.04 kg/m²   Gen: Lethargic  CV: RRR with normal S1 / S2  Resp: Diminished bilaterally (L>R); No increase in respiratory effort, currently on 2 L NC with no increase in work of breathing however spO2 90-91%  Abd: (+)BS, soft, no signs of tenderness, non-distended; no rebound or guarding  Ext: 2+ edema to bilateral legs  Skin: Warm and dry  Laboratory Data:     No results for input(s): \"PGLU\" in the last 168 hours.  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 Work-up thus far reveals Klebsiella bacteremia. Will plan for US doppler to r/o thrombus, US  guided paracentesis to assess for SBP, and pending clinical course consider EUS/EGD with neurolysis given chronic abd pain  Plan:   US abd doppler  US guided paracentesis with fluid for cell count, cx, albumin, protein, and cytology   Pending clinical course, can consider EUS with possible neurolysis given recurrent upper abd pain (discussed with Dr Ritchie; discussed with pt's wife at bedside). Ideally would like to have bacteremia resolve and have pt off Eliquis to complete if the procedure remains within pt's goals of care  Continue abx per ID recommendations   Continue to work-up mentation per oncology/Hospitalist recommendations  Attending addendum (Dr EARL Sierra) to follow later today and provide formal, final recommendations at that time   ARTEMIO Hood  12:34 PM  10/1/2024  [facility]  [phone]    GI attending addendum:    I have personally seen and examined this patient and agree with above nurse practitioner's assessment and recommendations.     Briefly, patient was transferred to the ICU today as he was noted to be hypotensive and lethargic.  Upon seeing patient in the ICU, blood pressure had improved and lethargy had improved.  He believes it was from pain medication and Benadryl use.  He does note some continued abdominal discomfort.  On physical exam, ultrasound was being performed.  He was resting comfortably in bed.  Wife is present in room.  He remains tender to palpation in the right upper quadrant.  Ultrasound with Doppler shows normal flow in the hepatic and portal veins.  His lactate was normal.  Ultrasound paracentesis was also planned, however INR was noted to be greater than 4 so procedure was postponed.  Eliquis is currently being held by hematology.  Patient was also noted to have Klebsiella bacteremia.  This is currently being managed by infectious disease.  Biliary stent appears patent with normal liver enzymes some less likely cholangitis.  May need to rule  out port infection.  Monitor CBC and CMP in the morning.  Thank you for the consultation.  Will continue to follow along with you.    Giuseppe Sierra,   6:52 PM  10/1/2024  [facility]  [phone]

## 2024-10-01 NOTE — CONSULTS
INFECTIOUS DISEASE CONSULTATION    Dontae Cortez JrFritz Patient Status:  Inpatient    10/15/1970 MRN [id number]   Location [facility] 4NW-A Attending Nixon Vergara MD   Hosp Day # 1 PCP Adrian Horowitz MD       Requested by Dr. Arshad    Reason for Consultation:  Klebsiella pneumoniae bacteremia    History of Present Illness:  Dontae Cortez Jr. is a a(n) 53 year old male diagnosed with metastatic esophageal cancer with malignant biliary obstruction  He has a port a cath and receiving chemo. He recently underwent ERCP and pancreatic duct stent placement on 24.  He was presented to ED on  with abdominal pain.  CT on  shows mild/mod ascites, gallbladder distention, mild gallbladder thickening, colonic wall thickening.  No diarrhea       History:  Past Medical History:    Back problem    Belching    Black stools    Borderline diabetes    Dx in 2013 - HgA1C 6.2%    C. difficile diarrhea    pt treated and without symptoms    Chest pain    Coronary artery disease    On 13: CABG x 4 with LIMA to LAD and SVG to diagonal, OM and PDA    Decorative tattoo    Depression    Difficult intubation    h/o esophagectomy for CA; developed esophagobronchial vistula    Disorder of liver    LIVER CA    Esophageal cancer (HCC)    completed chemo    Esophageal reflux    Essential hypertension    Exposure to medical diagnostic radiation    last tx 2022    Frequent urination    Gastroparesis    Heartburn    High blood pressure    High cholesterol    Found when I had quadruple bypass    History of COVID-19    asymptomatic - pt was dx during a hospitalization for another diagnosis. No continued symptoms    History of stomach ulcers    Hyperlipidemia    Hyperlipidemia LDL goal < 70    Indigestion    Morbid obesity with BMI of 40.0-44.9, adult (HCC)    Muscle weakness    Nausea vomiting and diarrhea    Nontoxic multinodular goiter    Dx in 2013: pt was  told that imaging showed thyroid cysts per PCP    Pancreatic cancer (HCC)    last dose 12/4/2023 is scheduled for another round 12/27/23    Peripheral vascular disease (HCC)    pt denies    Personal history of antineoplastic chemotherapy    for esophageal cancer/completed    Personal history of antineoplastic chemotherapy    pancreatic cancer    Personal history of antineoplastic chemotherapy    Last treatment 3/12/24    Problems with swallowing    Pulmonary nodules    Dx in 8/2013: CT chest showed small bilateral fissural-based lung nodules less than 1 cm    S/P CABG x 4    On 8/16/13: CABG x 4 with LIMA to LAD and SVG to diagonal, OM and PDA    Shortness of breath    when coughing; no oxygen    Vomiting     Past Surgical History:   Procedure Laterality Date    Appendectomy      Appendectomy      Arthroscopy of joint unlisted      right shoulder    Cabg      On 8/16/13: CABG x 4 with LIMA to LAD and SVG to diagonal, OM and PDA    Cardiac cath lab      On 8/14/2013: cardiac cath showed 3-vessel disease    Other surgical history      1.       Laparoscopic robotic-assisted esophagogastrectomy.    Port, indwelling, imp       Family History   Problem Relation Age of Onset    Cancer Mother         breast and colon     Diabetes Neg       reports that he quit smoking about 11 years ago. His smoking use included cigarettes. He started smoking about 38 years ago. He has a 27 pack-year smoking history. He has never been exposed to tobacco smoke. He has never used smokeless tobacco. He reports that he does not drink alcohol and does not use drugs.      Allergies:  Allergies   Allergen Reactions    Oxaliplatin HIVES     Shaking        Medications:    Current Facility-Administered Medications:     vancomycin (Vancocin) cap 125 mg, 125 mg, Oral, Daily    cefTRIAXone (Rocephin) 2 g in sodium chloride 0.9% 100 mL IVPB-ADDV, 2 g, Intravenous, Q24H    heparin (Porcine) 100 Units/mL lock flush 500 Units, 5 mL, Intravenous, PRN     apixaban (Eliquis) tab 5 mg, 5 mg, Oral, BID    baclofen (Lioresal) tab 10 mg, 10 mg, Oral, TID    dicyclomine (Bentyl) cap 10 mg, 10 mg, Oral, TID AC&HS    furosemide (Lasix) tab 20 mg, 20 mg, Oral, Daily    losartan (Cozaar) tab 100 mg, 100 mg, Oral, Daily    metoprolol tartrate (Lopressor) tab 25 mg, 25 mg, Oral, 2x Daily(Beta Blocker)    morphINE ER (MS Contin) tab 30 mg, 30 mg, Oral, 2 times per day    OLANZapine (ZyPREXA) tab 5 mg, 5 mg, Oral, Nightly    oxyCODONE immediate release tab 10 mg, 10 mg, Oral, Q4H PRN    pantoprazole (Protonix) DR tab 40 mg, 40 mg, Oral, Before breakfast    pregabalin (Lyrica) cap 25 mg, 25 mg, Oral, BID    sertraline (Zoloft) tab 200 mg, 200 mg, Oral, Daily    acetaminophen (Tylenol Extra Strength) tab 500 mg, 500 mg, Oral, Q4H PRN    melatonin tab 3 mg, 3 mg, Oral, Nightly PRN    glycerin-hypromellose- (Artificial Tears) 0.2-0.2-1 % ophthalmic solution 1 drop, 1 drop, Both Eyes, QID PRN    sodium chloride (Saline Mist) 0.65 % nasal solution 1 spray, 1 spray, Each Nare, Q3H PRN    ondansetron (Zofran) 4 MG/2ML injection 4 mg, 4 mg, Intravenous, Q6H PRN    prochlorperazine (Compazine) 10 MG/2ML injection 5 mg, 5 mg, Intravenous, Q8H PRN    HYDROmorphone (Dilaudid) 1 MG/ML injection 0.2 mg, 0.2 mg, Intravenous, Q2H PRN **OR** HYDROmorphone (Dilaudid) 1 MG/ML injection 0.4 mg, 0.4 mg, Intravenous, Q2H PRN **OR** HYDROmorphone (Dilaudid) 1 MG/ML injection 0.8 mg, 0.8 mg, Intravenous, Q2H PRN  Current Facility-Administered Medications on File Prior to Encounter   Medication Dose Route Frequency Provider Last Rate Last Admin    [COMPLETED] ondansetron (Zofran) 16 mg, dexAMETHasone (Decadron) 20 mg in sodium chloride 0.9% 110 mL IVPB   Intravenous Once Senia Foster MD   Stopped at 09/23/24 1039    [COMPLETED] diphenhydrAMINE (Benadryl) 50 mg/mL  injection 25 mg  25 mg Intravenous Once Senia Foster MD   25 mg at 09/23/24 1018    [COMPLETED] famotidine (Pepcid) 20 mg/2mL  injection 20 mg  20 mg Intravenous Once Senia Foster MD   20 mg at 24 1020    [COMPLETED] PACLitaxel (Taxol) 156 mg in sodium chloride 0.9% 276 mL infusion  80 mg/m2 (Order-Specific) Intravenous Once Senia Foster MD   Stopped at 24 1215    [COMPLETED] magnesium oxide (Mag-Ox) tab 400 mg  400 mg Oral Once Ghelani, Quinton, DO   400 mg at 24 0839    [COMPLETED] magnesium oxide (Mag-Ox) tab 400 mg  400 mg Oral Once Ghelani Quinton, DO   400 mg at 24 0845    [COMPLETED] potassium chloride (Klor-Con M20) tab 40 mEq  40 mEq Oral Once Ghelani Quinton, DO   40 mEq at 24 0845    [COMPLETED] magnesium oxide (Mag-Ox) tab 400 mg  400 mg Oral Once Jan Brito MD   400 mg at 24 0854    [] indomethacin (Indocin) 100 MG rectal suppository             [COMPLETED] magnesium oxide (Mag-Ox) tab 400 mg  400 mg Oral Once Jan Brito MD   400 mg at 24 0815    [COMPLETED] HYDROmorphone (Dilaudid) 1 MG/ML injection 0.5 mg  0.5 mg Intravenous Q30 Min PRN Vishal Harrison MD   0.5 mg at 09/15/24 1536    [COMPLETED] sodium chloride 0.9 % IV bolus 500 mL  500 mL Intravenous Once Vishal Harrison MD   Stopped at 09/15/24 1410    [COMPLETED] iopamidol 76% (ISOVUE-370) injection for power injector  100 mL Intravenous ONCE PRN Vishal Harrison MD   100 mL at 09/15/24 1450    [COMPLETED] piperacillin-tazobactam (Zosyn) 4.5 g in dextrose 5% 100 mL IVPB-ADDV  4.5 g Intravenous Once Vishal Harrison MD   Stopped at 09/15/24 1648    [] midazolam (Versed) 2 MG/2ML injection 1 mg  1 mg Intravenous Q5 Min PRN Jenny Navarrete MD   1 mg at 09/10/24 1104    [] fentaNYL (Sublimaze) 50 mcg/mL injection 50 mcg  50 mcg Intravenous Q5 Min PRN Jenny Navarrete MD   50 mcg at 24 1605    [COMPLETED] heparin (Porcine) 100 Units/mL lock flush 500 Units  5 mL Intracatheter Once Cuong Wan MD   500 Units at 09/10/24 1415    [COMPLETED] heparin (Porcine) 100 Units/mL lock flush 500 Units  5 mL  Intracatheter Once Senia Foster MD   500 Units at 08/29/24 0845    [COMPLETED] ondansetron (Zofran) 16 mg, dexAMETHasone (Decadron) 20 mg in sodium chloride 0.9% 110 mL IVPB   Intravenous Once Grecia Angelo APRN   Stopped at 08/14/24 1148    [COMPLETED] nivolumab (Opdivo) 240 mg in sodium chloride 0.9% 124 mL IVPB  240 mg Intravenous Once Grecia Angelo APRN   Stopped at 08/14/24 1047    [COMPLETED] trastuzumab-qyyp (Trazimera) 294 mg in sodium chloride 0.9% 264 mL infusion  4 mg/kg (Treatment Plan Recorded) Intravenous Once Grecia Angelo APRN   Stopped at 08/14/24 1124    [COMPLETED] leucovorin 800 mg in sodium chloride 0.9 % 250 mL infusion  400 mg/m2 (Treatment Plan Recorded) Intravenous Once Grecia Angelo APRN   Stopped at 08/14/24 1225    [COMPLETED] famotidine (Pepcid) 20 mg/2mL injection 20 mg  20 mg Intravenous Once Grecia Angelo APRN   20 mg at 08/14/24 1153    [COMPLETED] heparin (Porcine) 100 Units/mL lock flush 500 Units  5 mL Intracatheter Once Senia Foster MD   500 Units at 08/08/24 0810    [COMPLETED] methylPREDNISolone sodium succinate (Solu-MEDROL) injection 125 mg  125 mg Intravenous Once Senia Foster MD   125 mg at 07/29/24 1126    [COMPLETED] diphenhydrAMINE (Benadryl) 50 mg/mL  injection 25 mg  25 mg Intravenous Once Senia Foster MD   25 mg at 07/29/24 1110    [COMPLETED] famotidine (Pepcid) 20 mg/2mL injection 20 mg  20 mg Intravenous Once Senia Foster MD   20 mg at 07/29/24 1126    [COMPLETED] ondansetron (Zofran) 16 mg, dexAMETHasone (Decadron) 20 mg in sodium chloride 0.9% 110 mL IVPB   Intravenous Once Senia Foster MD   Stopped at 07/29/24 1123    [COMPLETED] nivolumab (Opdivo) 240 mg in sodium chloride 0.9% 124 mL IVPB  240 mg Intravenous Once Senia Foster MD   Stopped at 07/29/24 1029    [COMPLETED] trastuzumab-qyyp (Trazimera) 294 mg in sodium chloride 0.9% 264 mL infusion  4 mg/kg (Treatment Plan Recorded) Intravenous Once Senia Foster MD    Stopped at 07/29/24 1102    [COMPLETED] oxaliplatin (Eloxatin) 150 mg in dextrose 5% 280 mL infusion  75 mg/m2 (Treatment Plan Recorded) Intravenous Once Senia Foster MD   Stopped at 07/29/24 1327    [COMPLETED] leucovorin 800 mg in dextrose 5% 250 mL infusion  400 mg/m2 (Treatment Plan Recorded) Intravenous Once Senia Foster MD   Stopped at 07/29/24 1327    [COMPLETED] famotidine (Pepcid) 20 mg/2mL injection 20 mg  20 mg Intravenous Once Grecia Angelo APRN   20 mg at 07/29/24 1314    [COMPLETED] prochlorperazine (Compazine) tab 10 mg  10 mg Oral Once Grecia Angelo APRN   10 mg at 07/29/24 1314    [COMPLETED] diphenhydrAMINE (Benadryl) 50 mg/mL  injection 25 mg  25 mg Intravenous Once Grecia Angelo APRN   25 mg at 07/29/24 1330    [COMPLETED] methylPREDNISolone sodium succinate (Solu-MEDROL) injection 125 mg  125 mg Intravenous Once Grecia Angelo APRN   125 mg at 07/15/24 0931    [COMPLETED] diphenhydrAMINE (Benadryl) 50 mg/mL  injection 25 mg  25 mg Intravenous Once Grecia Angelo APRN   25 mg at 07/15/24 0927    [COMPLETED] famotidine (Pepcid) 20 mg/2mL injection 20 mg  20 mg Intravenous Once Grecia Angelo APRN   20 mg at 07/15/24 0925    [COMPLETED] ondansetron (Zofran) 16 mg, dexAMETHasone (Decadron) 20 mg in sodium chloride 0.9% 110 mL IVPB   Intravenous Once Grecia Angelo APRN   Stopped at 07/15/24 1054    [COMPLETED] nivolumab (Opdivo) 240 mg in sodium chloride 0.9% 124 mL IVPB  240 mg Intravenous Once Grecia Angelo APRN   Stopped at 07/15/24 1004    [COMPLETED] trastuzumab-qyyp (Trazimera) 294 mg in sodium chloride 0.9% 264 mL infusion  4 mg/kg (Treatment Plan Recorded) Intravenous Once Grecia Angelo APRN   Stopped at 07/15/24 1035    [COMPLETED] oxaliplatin (Eloxatin) 150 mg in dextrose 5% 280 mL infusion  75 mg/m2 (Treatment Plan Recorded) Intravenous Once Grecia Angelo APRN   Stopped at 07/15/24 1230    [COMPLETED] leucovorin 800 mg in dextrose 5% 250 mL infusion  400  mg/m2 (Treatment Plan Recorded) Intravenous Once Grecia Angelo APRN   Stopped at 07/15/24 1230    [COMPLETED] diphenhydrAMINE (Benadryl) 50 mg/mL  injection 25 mg  25 mg Intravenous Once Grecia Angelo APRN   25 mg at 07/15/24 1220    [COMPLETED] diphenhydrAMINE (Benadryl) 50 mg/mL  injection 25 mg  25 mg Intravenous Once Grecia Angelo APRN   25 mg at 07/15/24 1230    [COMPLETED] famotidine (Pepcid) 20 mg/2mL injection 20 mg  20 mg Intravenous Once Grecia Angelo APRN   20 mg at 07/15/24 1253    [COMPLETED] atropine sulfate 0.4 mg/mL injection 0.2 mg  0.2 mg Intravenous Once Grecia Angelo APRN   0.2 mg at 07/15/24 1255     Current Outpatient Medications on File Prior to Encounter   Medication Sig Dispense Refill    DICYCLOMINE 10 MG Oral Cap TAKE 1 CAPSULE BY MOUTH 4 TIMES DAILY BEFORE MEALS AND NIGHTLY. 90 capsule 1    Potassium Chloride ER 20 MEQ Oral Tab CR Take 1 tablet by mouth daily. 30 tablet 1    baclofen 10 MG Oral Tab Take 1 tablet (10 mg total) by mouth 3 (three) times daily. 30 tablet 0    morphINE ER 30 MG Oral Tab CR Take 1 tablet (30 mg total) by mouth every 12 (twelve) hours. 60 tablet 0    oxyCODONE 10 MG Oral Tab Take 1 tablet (10 mg total) by mouth every 4 (four) hours as needed (for cancer related pain). 180 tablet 0    pregabalin 25 MG Oral Cap Take 1 capsule (25 mg total) by mouth 2 (two) times daily. 60 capsule 0    losartan 100 MG Oral Tab Take 1 tablet (100 mg total) by mouth daily. 30 tablet 0    OLANZapine 5 MG Oral Tab Take 1 tablet (5 mg total) by mouth nightly.      prochlorperazine (COMPAZINE) 10 mg tablet Take 1 tablet (10 mg total) by mouth every 6 (six) hours as needed for Nausea.      metoprolol tartrate 25 MG Oral Tab Take 1 tablet (25 mg total) by mouth 2x Daily(Beta Blocker). 60 tablet 1    ondansetron 8 MG Oral Tablet Dispersible Take 1 tablet (8 mg total) by mouth every 8 (eight) hours as needed for Nausea. 30 tablet 0    furosemide 20 MG Oral Tab Take 1 tablet  (20 mg total) by mouth daily. 30 tablet 1    apixaban 5 MG Oral Tab Take 1 tablet (5 mg total) by mouth 2 (two) times daily. 60 tablet 2    sertraline 100 MG Oral Tab Take 1 tablet (100 mg total) by mouth daily. 30 tablet 0    PANTOPRAZOLE 40 MG Oral Tab EC TAKE 1 TABLET BY MOUTH BEFORE BREAKFAST 30 tablet 0       Review of Systems:    A comprehensive 10 point review of systems was completed.  Pertinent positives and negatives noted in the the HPI.      Physical Exam:    General: No acute distress. Alert and oriented x 3.  Vital signs: Temp:  [98.1 °F (36.7 °C)-98.5 °F (36.9 °C)] 98.1 °F (36.7 °C)  Pulse:  [70-96] 79  Resp:  [17-18] 18  BP: ()/(60-80) 99/70  SpO2:  [90 %-96 %] 90 %  Body mass index is 20.04 kg/m².  HEENT: Moist mucous membranes. Extraocular muscles are intact.  Neck: No swelling, no masses  Port a cath  Respiratory: Non labored, symmetric exursion  Cardiovascular: No irregularities in rhythm  Abdomen: Soft, nontender, dull flank,minimal distended  Musculoskeletal: Full range of motion of all extremities.  No swelling noted.  Joints: no effusions  Skin: No lesions. No erythema, no open wounds    Laboratory Data:  Laboratory data reviewed      Recent Labs   Lab 10/01/24  0625   RBC 3.07*   HGB 9.4*   HCT 27.8*   MCV 90.6   MCH 30.6   MCHC 33.8   RDW 14.6   NEPRELIM 11.48*   WBC 13.2*   .0       Recent Labs   Lab 09/30/24  0637 09/30/24  1616 10/01/24  0625   *  --  125*   BUN 13  --  21   CREATSERUM 0.56*  --  1.41*   CA 8.1*  --  8.4*   ALB 1.9*  --  2.9*   *  --  136   K 2.6* 3.3* 3.7  3.7     --  104   CO2 27.0  --  26.0   ALKPHO 172*  --  159*   AST 27  --  21   ALT 37  --  30   BILT 1.3  --  1.3*   TP 5.6*  --  5.3*         Lab Results   Component Value Date    INR 4.19 10/01/2024    PTP 40.7 10/01/2024         Microbiologic Data:   Hospital Encounter on 09/30/24   1. Blood Culture     Status: Abnormal (Preliminary result)    Collection Time: 09/30/24  9:34 AM     Specimen: Blood,peripheral   Result Value Ref Range    Blood Culture Result Positive N/A    Blood Culture Result Klebsiella pneumoniae (A) N/A    Blood Smear Positive Blood Culture (A) N/A    Blood Smear Gram Negative Rods (A) N/A       Established Problem list:  Patient Active Problem List   Diagnosis    Primary hypertension    Hyperlipidemia with target low density lipoprotein (LDL) cholesterol less than 70 mg/dL    Coronary artery disease involving coronary bypass graft of native heart with angina pectoris (HCC)    S/P CABG x 4    Nontoxic multinodular goiter    Pulmonary nodules    Thrombophlebitis leg    BRANDAN (generalized anxiety disorder)    Right shoulder Arthroscopy acromioplasty ,distal  clavicle resection, rotator cuff/labral debridment  Global 05/13/2021    Right bicipital tenosynovitis    Malignant neoplasm of esophagus (HCC)    Pleural effusion    Esophageal anastomotic leak    Esophageal obstruction    On total parenteral nutrition (TPN)    Mechanical complication of esophagostomy (HCC)    Abdominal pain of unknown etiology    Migration of esophageal stent    Gastroparesis    Esophageal carcinoma (HCC)    Normocytic anemia    Esophageal fistula    Subacute cough    Acquired bronchoesophageal fistula (HCC)    Pneumonia of both lower lobes due to infectious organism    Malignant neoplasm of pancreas (HCC)    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 difficile colitis    Dysphagia, unspecified type    Dyspnea and respiratory abnormalities    Hypokalemia    Dysphagia    Narcotic drug use    History of esophageal cancer    Palliative care by specialist    Neoplasm related pain    Endobronchial mass     Hyponatremia    Thrombocytopenia (HCC)    Acute kidney injury (HCC)    Hyperglycemia    Aspiration pneumonitis (HCC)    C. difficile diarrhea    Aspiration pneumonia (HCC)    Malignant neoplasm metastatic to liver (HCC)    Hyperlipidemia    Nausea vomiting and diarrhea    Fistula, bronchoesophageal (HCC)    Iron deficiency anemia, unspecified iron deficiency anemia type    Azotemia    Palliative care encounter    Goals of care, counseling/discussion    Cancer related pain    Anemia    Fistula    Acute cough    Pneumonia of right lung due to infectious organism, unspecified part of lung    Bacteremia    Acute postoperative pain    Exocrine pancreatic insufficiency (HCC)    Hypertriglyceridemia    Acute pulmonary embolism without acute cor pulmonale, unspecified pulmonary embolism type (HCC)    Acute deep vein thrombosis (DVT) of popliteal vein of right lower extremity (HCC)    Altered mental status, unspecified altered mental status type    Transaminitis    Biliary obstruction (HCC)    Hyperbilirubinemia    Esophageal adenocarcinoma (HCC)    Cancer associated pain    Abdominal pain, acute    Abnormal CT of the chest    Lower extremity edema       ASSESSMENT/PLAN:  1. Klebsiella pneumoniae bacteremia  -underlying metastatic esophageal cancer on chemo, port in place  -recent PD stent on 9/17 for malignant obstruction  Admitted 9/30 abdominal pain  -CT showing non specific findings, mild ascites, gallbladder thickening, colon thickening, has no diarrhea    LFTS not elevated    --concern for billiary source with recent stent  -concern of potential port a cath infection    US abdomen pending, and GI following  Continue Ceftriaxone pending sensitivities (no PCR target for CTX-M)  Repeat blood culture, port and peripheral          Conner Bell MD, MD  [facility]  [phone]

## 2024-10-01 NOTE — DIETARY NOTE
[facility]   part of [facility]    NUTRITION ASSESSMENT    Pt meets severe malnutrition criteria at this time.    CRITERIA FOR MALNUTRITION DIAGNOSIS:  Criteria for severe malnutrition diagnosis: chronic illness related to wt loss greater than 5% in 1 month, body fat severe depletion, and muscle mass severe depletion      NUTRITION INTERVENTION:    RD nutrition Care Plan- Adjusted diet to least restrictive to maximize intake, Encouraged increased PO intake, and Encouraged small frequent meals with emphasis on high calorie/high protein  Meal and Snacks - Monitor and encourage adequate PO intake.   Medical Food Supplements - Pt not interested at this time. Rationale/use for oral supplements discussed.  Nutrition Education - discussed nutrient dense foods, shakes and smoothies at home, more frequent meals. Pt/family receptive to education, no barriers noted. Handouts provided.       PATIENT STATUS: 10/01/24 at risk  53 year old male admitted with abdominal pain and swelling in legs.  Pt with metastatic esophageal and pancreatic cancer.  Met with pt and wife who report decreased intake. He eats small amounts at meal times. He does not like ONS. He has severe muscle and fat depletion.  Pt also with lower extremity edema.        ANTHROPOMETRICS:  Ht: 188 cm (6' 2\")  Wt: 70.8 kg (156 lb 1.4 oz).   BMI: Body mass index is 20.04 kg/m².  IBW: 86.4 kg      WEIGHT HISTORY:   Weight loss: Yes, Severe Wt loss of 12 lbs, 7%, over 1 months     Wt Readings from Last 10 Encounters:   09/30/24 70.8 kg (156 lb 1.4 oz)   09/23/24 73.5 kg (162 lb)   09/15/24 70.3 kg (155 lb)   09/06/24 76.2 kg (168 lb)   08/19/24 76.2 kg (168 lb)   08/14/24 76.4 kg (168 lb 8 oz)   08/02/24 74.2 kg (163 lb 9.6 oz)   07/29/24 76.9 kg (169 lb 8 oz)   07/15/24 74.4 kg (164 lb)   07/09/24 73.5 kg (162 lb)        NUTRITION:  Diet:       Procedures    Regular/General diet Is Patient on Accuchecks? No      Food Allergies: No  Cultural/Ethnic/Religion  Preferences Addressed: Yes    Percent Meals Eaten (last 3 days)       Date/Time Percent Meals Eaten (%)    09/30/24 1800 50 %            GI system review: WNL Last BM Date: 09/29/24  Skin and wounds: none    NUTRITION RELATED PHYSICAL FINDINGS:     1. Body Fat/Muscle Mass: severe depletion body fat Orbital fat pad and Buccal fat pad and severe muscle depletion Temple region and Clavicle region     2. Fluid Accumulation: lower extremity edema     NUTRITION PRESCRIPTION: 70.8kg  Calories: 0566-5814 calories/day (25-30 kcal/kg)  Protein:  grams protein/day (1.2-1.5 grams protein per kg)  Fluid: ~1 ml/kcal or per MD discretion    NUTRITION DIAGNOSIS/PROBLEM:  Malnutrition related to physiological causes as evidenced by documented/reported insufficient oral intake, documented/reported unintentional weight loss, loss of fat mass, and loss of muscle mass      MONITOR AND EVALUATE/NUTRITION GOALS:  PO intake of 75% of meals TID - New  Weight stable within 1 to 2 lbs during admission - New      MEDICATIONS:  Reviewed    LABS:  Reviewed    Pt is at High nutrition risk        Juhi Valentino RD,LDN  Clinical Dietitian  [id number]

## 2024-10-01 NOTE — IMAGING NOTE
Call placed to DANY James. Instructions given:  Clear liquid diet. No solid food 4 hours prior to procedure.  PT/INR day of procedure. INR needs to be < 3    Update: Received call from DANY James. Physician is going to hold Eliquis and allow INR to drift down.

## 2024-10-01 NOTE — PROGRESS NOTES
[facility] Progress Note   Length of Stay: 1    Subjective:   -Feels slightly better than yesterday   -Procalcitonin elevated to 3.06  -WBC 13.2  -Blood culture + Klebsiella   -US abdomen and paracentesis pending    Oncology History   -2018: Per report had a normal EGD and colonoscopy     -June 2022: He met with GI due to new onset dysphagia which started about 1 month prior to his visit.  He had an esophagram with a focal abrupt narrowing with irregular margins in the distal one third of the esophagus extending to the GE junction.  He also had an episode of food impaction. He has also lost about 15 lb. He was a heavy smoker from age 14 to about 40 (1.5 PPD). Also with alcohol use currently. Mother with a history of breast and colon cancer.  EGD and EUS with Dr. Yadav on 6/7/2022: Severe esophagitis with a concerning mass extending 37-39 centimeters from the incisors.  Nonobstructive mass.  By EUS uT3N1 disease.  Pathology showed invasive moderate to poorly differentiated adenocarcinoma.  HER2 amplified by FISH. CT CAP done at [facility] on 6/16/22: Results not loaded to PACs yet.  CA 19-9 from the same day was 107.4.  CEA normal. PET/CT on 6/20/2022: Increased distal esophageal uptake with an SUV of 14.4.  Concern for shreya metastases.     -Concurrent chemoradiation with carboplatin AUC2 plus paclitaxel 50 mg/m2: July 2022-August 2022 with  down (280-->30). Post treatment PET/CT showed improvement.      -Surgery with Dr. Lu on 9/30/22: ypT1b pN0. Recovery has been complicated by an esophageal leak,      -I met with him on 12/12/22. We discussed adjuvant opdivo for 1 year. His  was elevated to 48 and repeat was 64. CT CAP was recommended.      -He was admitted on 12/25/22 for abdominal pain. He had a POEM procedure done. He was also noted to have a RLL consolidation. He was seen by pulmonary, based on imaging an outpatient PET/CT was recommended and a biopsy of the most  FDG avid lesion would be considered. Noted to have CAD and an outpatient evaluation was recommended. Outpatient PET/CT done on 1/4/23 was reviewed in tumor board and there was no focal area of concern and adjuvant immunotherapy recommended.      -Adjuvant opdivo:01/2023-03/2023. PET/CT in in 04/2023 showed uptake in the pancreatic head and tail. Also with some uptake in liver. EGD/EUS on 4/27/23 with Dr. Ritchie: Pancreatic head mass positive for adenocarcinoma: Comparison to previous esophageal cancer shows similarity but IHC in non-specific. Her 2 IHC was 2+ on this specimen but FISH was negative.  Given discordant results on initial HER2 and follow-up HER2 we decided to proceed with chemotherapy with immunotherapy with HER2 directed therapy.     -Chemo + Herceptin + Immunotherapy: He received 7 cycles of FOLFOX + Herceptin + Immunotherapy (05/2023-09/2023). Imaging after C5 showed a favorable response. After C7, we held oxaliplatin due to neuropathy and he was started on maintenance herceptin + IO maintenance. Signatera was negative 09/11/23.     -Herceptin + IO Maintenance: He received 3 cycles from 09/25/23-10/31/23. Treatment was delayed due to hospitalizations.      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 Due to new findings-restarting FOLFOX discussed.      -admitted from 1/8/24-1/13/24 for persistent dysphagia and bronchesophageal stent.      -1/17/24: EGD with fistula closure with ASD occluder and removal of the bronchial stent.     -FOLFOX + Herceptin + Opdivo restarted: 3 cycles: 02/2024-03/2024     -Treatment delayed again for recurrent hospital admissions     -He underwent a  Latissimus dorsi flap reconstruction for his bronchoesophageal fistula on 5/1/24     -PET/CT 5/15/24: at least 3 areas of uptake in the right hepatic lobe, uptake in pancrease and retroperitoneum. Post-operative uptake in chest wall.      -FOLFOX + Herceptin + Nivolumab Restarted: 6/11/2024 until 8/14/2024.  He received 5 cycles.  A PET scan after 5 cycles showed overall progression of metastatic disease with new gastrohepatic and peripancreatic lymph nodes along with enlargement of pancreatic and hepatic metastases.  There were also new lung nodules.  A CT-guided liver biopsy on 9/10/2024 was done which was positive for metastatic adenocarcinoma and the immunoprofile is compatible with the patient's esophageal primary. HER2 negative on FISH.     -He was admitted on 9/15/24 for abdominal pain. Labs showed elevated AST/ALT and T bili (6.7). CTA CAP showed progression of pancreatic and hepatic masses. There is intra-extra hepatic biliary ductal dilation. Subpleural reticular opacities noted-inflammatory or interstitial process? Hi Res CT recommended. He underwent an ERCP which showed a CBD stricture with biliary sphincterectomy and CBD, PD bile duct stent placement with Dr. Ritchie.     -Weekly paclitaxel 80 mg/m2: 3 weeks on 1 week off   -C1D1: 9/23/24:  42,110    ROS: 12 Point ROS completed and pertinent positives are above     Objective:    cefTRIAXone  1 g Intravenous Q24H    vancomycin  125 mg Oral Daily    apixaban  5 mg Oral BID    baclofen  10 mg Oral TID    dicyclomine  10 mg Oral TID AC&HS    furosemide  20 mg Oral Daily    losartan  100 mg Oral Daily    metoprolol tartrate  25 mg Oral 2x Daily(Beta Blocker)    morphINE ER  30 mg Oral 2 times per day    OLANZapine  5 mg Oral Nightly    pantoprazole  40 mg Oral Before breakfast    pregabalin  25 mg Oral BID    sertraline  200 mg Oral Daily       heparin    oxyCODONE    acetaminophen    melatonin    glycerin-hypromellose-    sodium chloride     ondansetron    prochlorperazine    HYDROmorphone **OR** HYDROmorphone **OR** HYDROmorphone    Physical Exam  Vitals:    10/01/24 0559   BP: 91/60   Pulse: 70   Resp: 18   Temp: 98.1 °F (36.7 °C)     General: NAD, AOX3  HEENT: clear op, mmm, no jvd, no scleral icterus   CV: RRR S1S2 no murmurs, no edema  Lungs: CTAB, no increased work of breathing  Abd: soft nt nd +BS no hepatosplenomegaly  Neuro: CN: II-XII grossly intact    Labs/Imaging/Path:  Lab Results   Component Value Date    WBC 13.2 (H) 10/01/2024    HGB 9.4 (L) 10/01/2024    HCT 27.8 (L) 10/01/2024    MCV 90.6 10/01/2024    .0 10/01/2024       Recent Labs   Lab 09/30/24  0637 09/30/24  1616 10/01/24  0625   *  --  125*   BUN 13  --  21   CREATSERUM 0.56*  --  1.41*   CA 8.1*  --  8.4*   ALB 1.9*  --  2.9*   *  --  136   K 2.6* 3.3* 3.7  3.7     --  104   CO2 27.0  --  26.0   ALKPHO 172*  --  159*   AST 27  --  21   ALT 37  --  30   BILT 1.3  --  1.3*   TP 5.6*  --  5.3*       Imaging: Reviewed   CTA CAP on 9/30/24  There has been interval CBD stent placement.      Additionally, in the interim from the previous exam of 9/15/2024, there has been development of diffuse patchy ground-glass opacities within the lungs bilaterally, mild to moderate diffuse intra-abdominal ascites with mesenteric and omental stranding   diffusely as well as the development of subcutaneous edematous changes/stranding involving the entire visualized subcutaneous tissues of the chest, abdomen, pelvis and upper thighs.  Additionally, there has been interval increased gallbladder distension   with mild gallbladder wall thickening and diffuse pericholecystic fluid and fat stranding.  There is also interval development of colonic wall thickening involving the transverse to rectosigmoid colon.      Given the multiple interval changes, the appearance would suggest an acute, diffuse inflammatory process.      The previously identified metastatic lesions of the  liver and pancreas appear relatively stable.      No pulmonary emboli noted.     Assessment and Plan:   Klebsiella pneumoniae Bacteremia: ID following. Possibly from recent CBD stent. On ceftriaxone. Ok to remove port if needed as he is on weekly taxol for his chemotherapy    Trembling/Hallucinations: MR Brain ordered     Ascites: Plan for Paracentesis once INR is down. GI is following     Metastatic esophageal cancer  -Most recent imaging is consistent with metastatic disease progression.  Repeat HER2 is negative on FISH. We discussed that he will not be a good candidate for Cyramza due to history of esophageal fistula and PE/DVT. He is on weekly paclitaxel and received cycle 1 day 1 on 9/23/24.      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 CTA from 09/2024 shows resolution Ok to hold for procedures. Note that INR elevation is likely from Eliquis.      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 He is s/p ERCP on 9/17/24 with CBD, PD stent placement.      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 Now s/p repair 5/1/24.     Appreciated palliative care consultation     COURTNEY Foster MD  [facility]

## 2024-10-01 NOTE — PLAN OF CARE
Patient received from the floor around 1500. Upon arrival, patient is axox4. Follows commands and BROOKS. 3L NC. NSR on the monitor. Normotensive originally, levo added around 1700. General diet with poor appetite. Wife at bedside.

## 2024-10-01 NOTE — PLAN OF CARE
Received pt alert and oriented x4. PRN Dilaudid given for pain. All medications given  per MAR. Pt had positive blood cultures for gram negative rods and blood culture PCR positive for Klebsiella Pneumonia. MD notified, see orders.Safety precautions in place. Call light within reach.

## 2024-10-01 NOTE — PROGRESS NOTES
[facility]   part of [facility]  Palliative Care Progress Note    Dontae Cortez Jr. Patient Status:  Inpatient    10/15/1970 MRN [id number]   Location [facility] 4NW-A Attending Nixon Vergara MD   Hosp Day # 1 PCP Adrian Horowitz MD         Summary     Dontae Cortez Jr. is a 53 year old male with history of metastatic pancreatic cancer (dx 2022 see oncology consult for hx details) s/p esophagectomy and chemo (on current new tx regimen 1st cycle received 24), PE (Eliquis), HTN, CAD s/p CABG () who was admitted on 2024 for pain exacerbation, SOB and AMS (per wife). Work up in our hospital revealed CT A/P as noted below with generalized inflammatory processes and lungs with diffuse GGO bilat, also with hypokalemia, hyponatremia and BLE edema.      Consult ordered by:: Taylor Hitchcock for evaluation of Palliative Care needs and Uncontrolled symptoms.    Subjective     Interval events: Since last encounter, states he slept well (noted by no use of breakthrough opioids overnight) but has been experiencing hallucinations today included seeing spiders on the walls and my eyes popping out of my face while we were talking. States it just started today. Also visible trembling, generalized legs, arms, torso. States he dropped  a water bottle this morning. Voice also tremulous. Mild confusion noted as well with discussing past week's events/timeline.     When I entered the room, the patient was awake & alert and lying in bed. No family present at bedside.     Symptom assessment:   Pain assessment:   24 hour (3035-2932) OME total: 108mg OME (Morphine ER 60mg + Dilaudid IV 2.4 mg (48mg OME)  Current pain: 7-8/10, described as sharp, tingling, located mostly on R side of abdomen, sometimes to back and L side abdomen, made worse by repositioning, alleviated by opioids  Pain goal: 5-6/10    See summary of discussion below.     Review of Systems:    Symptoms(s):  Confusion;Pain    Dyspnea: denies, increased pain with deep breathing  Cough: denies  Nausea: denies  Appetite: fair  Pain: as above  Constipation: no, states he had a small BM today    Allergies:  Allergies   Allergen Reactions    Oxaliplatin HIVES     Shaking        Medications:     Current Facility-Administered Medications:     vancomycin (Vancocin) cap 125 mg, 125 mg, Oral, Daily    cefTRIAXone (Rocephin) 2 g in sodium chloride 0.9% 100 mL IVPB-ADDV, 2 g, Intravenous, Q24H    heparin (Porcine) 100 Units/mL lock flush 500 Units, 5 mL, Intravenous, PRN    apixaban (Eliquis) tab 5 mg, 5 mg, Oral, BID    baclofen (Lioresal) tab 10 mg, 10 mg, Oral, TID    dicyclomine (Bentyl) cap 10 mg, 10 mg, Oral, TID AC&HS    furosemide (Lasix) tab 20 mg, 20 mg, Oral, Daily    losartan (Cozaar) tab 100 mg, 100 mg, Oral, Daily    metoprolol tartrate (Lopressor) tab 25 mg, 25 mg, Oral, 2x Daily(Beta Blocker)    morphINE ER (MS Contin) tab 30 mg, 30 mg, Oral, 2 times per day    OLANZapine (ZyPREXA) tab 5 mg, 5 mg, Oral, Nightly    oxyCODONE immediate release tab 10 mg, 10 mg, Oral, Q4H PRN    pantoprazole (Protonix) DR tab 40 mg, 40 mg, Oral, Before breakfast    pregabalin (Lyrica) cap 25 mg, 25 mg, Oral, BID    sertraline (Zoloft) tab 200 mg, 200 mg, Oral, Daily    acetaminophen (Tylenol Extra Strength) tab 500 mg, 500 mg, Oral, Q4H PRN    melatonin tab 3 mg, 3 mg, Oral, Nightly PRN    glycerin-hypromellose- (Artificial Tears) 0.2-0.2-1 % ophthalmic solution 1 drop, 1 drop, Both Eyes, QID PRN    sodium chloride (Saline Mist) 0.65 % nasal solution 1 spray, 1 spray, Each Nare, Q3H PRN    ondansetron (Zofran) 4 MG/2ML injection 4 mg, 4 mg, Intravenous, Q6H PRN    prochlorperazine (Compazine) 10 MG/2ML injection 5 mg, 5 mg, Intravenous, Q8H PRN    HYDROmorphone (Dilaudid) 1 MG/ML injection 0.2 mg, 0.2 mg, Intravenous, Q2H PRN **OR** HYDROmorphone (Dilaudid) 1 MG/ML injection 0.4 mg, 0.4 mg, Intravenous, Q2H PRN **OR**  HYDROmorphone (Dilaudid) 1 MG/ML injection 0.8 mg, 0.8 mg, Intravenous, Q2H PRN    Objective     Vital Signs:  Blood pressure 99/70, pulse 79, temperature 98.1 °F (36.7 °C), temperature source Oral, resp. rate 18, height 6' 2\" (1.88 m), weight 156 lb 1.4 oz (70.8 kg), SpO2 90%.  Body mass index is 20.04 kg/m².    Physical Exam:  General: Alert & Awake. In no apparent respiratory distress. Body habitus Thin   HEENT: AT/NC. No gross focal deficits. Dry MM   Cardiac: Irregularly irregular  Lungs: Normal effort on RA  Abdomen: Soft, tender, non-distended, normal bowel sounds X 4 quadrants   Extremities:  bilat  LE edema present, L>R  Neurologic: Alert and oriented to person, place, time, and situation some confused conversation noted  Psychiatric: Mood anxious due to the presence of new side effects  Skin: pale, Warm and dry.    Prior to admission Palliative performance scale PPSv2 (%): 90  Observed during hospitalization: 90 %    % Ambulation Activity Level Self-Care Intake Consciousness   100 Full  Normal  No Disease Full Normal Full   90 Full  Normal  Some Disease Full Normal Full   80 Full  Normal w/effort  Some Disease Full Normal or reduced Full   70 Reduced  Can't Perform Job  Some Disease Full Normal or reduced Full   60 Reduced  Can't Perform Hobby   Significant Disease Occ Assist Normal or reduced Full or confused   50 Mainly sit/lie Can't do any work  Extensive Disease Partial Assist Normal or reduced Full or confused   40 Mainly in bed Can't do any work  Extensive Disease Mainly Assist Normal or reduced Full or confused   30 Bed Bound Can't do any work  Extensive Disease Max Assist  Total Care Reduced  Drowsy/confused   20 Bed Bound Can't do any work  Extensive Disease Max Assist  Total Care Minimal  Drowsy/confused   10 Bed Bound Can't do any work  Extensive Disease Max Assist  Total Care Mouth Care  Drowsy/confused   0 Death        Hematology:  Lab Results   Component Value Date    WBC 13.2 (H) 10/01/2024     HGB 9.4 (L) 10/01/2024    HCT 27.8 (L) 10/01/2024    .0 10/01/2024       Coags:  Lab Results   Component Value Date    PT 20.4 (H) 01/30/2014    INR 4.19 (H) 10/01/2024    PTT 80.5 (H) 06/19/2024       Chemistry:  Lab Results   Component Value Date    CREATSERUM 1.41 (H) 10/01/2024    BUN 21 10/01/2024     10/01/2024    K 3.7 10/01/2024    K 3.7 10/01/2024     10/01/2024    CO2 26.0 10/01/2024     (H) 10/01/2024    CA 8.4 (L) 10/01/2024    ALB 2.9 (L) 10/01/2024    ALKPHO 159 (H) 10/01/2024    BILT 1.3 (H) 10/01/2024    TP 5.3 (L) 10/01/2024    AST 21 10/01/2024    ALT 30 10/01/2024    DDIMER 0.38 12/19/2023    BNP 33 08/15/2013    MG 1.8 09/20/2024    PHOS 2.9 03/15/2024    TROP <0.046 07/18/2017       Imaging:  CTA CHEST + CT ABD (W) + CT PEL (W) (CPT=71275/01114)    Result Date: 9/30/2024  CONCLUSION:  There has been interval CBD stent placement.  Additionally, in the interim from the previous exam of 9/15/2024, there has been development of diffuse patchy ground-glass opacities within the lungs bilaterally, mild to moderate diffuse intra-abdominal ascites with mesenteric and omental stranding diffusely as well as the development of subcutaneous edematous changes/stranding involving the entire visualized subcutaneous tissues of the chest, abdomen, pelvis and upper thighs.  Additionally, there has been interval increased gallbladder distension with mild gallbladder wall thickening and diffuse pericholecystic fluid and fat stranding.  There is also interval development of colonic wall thickening involving the transverse to rectosigmoid colon.  Given the multiple interval changes, the appearance would suggest an acute, diffuse inflammatory process.  The previously identified metastatic lesions of the liver and pancreas appear relatively stable.  No pulmonary emboli noted.    LOCATION:  [id number]   Dictated by (CST): Joseph Mcdaniels DO on 9/30/2024 at 8:10 AM     Finalized by (CST): Arslan  Joseph, DO on 9/30/2024 at 8:36 AM        Summary of Discussion      Focus of discussion on symptom/pain management as noted above. Discussed concern related to new side effects today (hallucinations, tremors). Note Cr increased 1.41 (from 0.56) and EGFR down to 59 (from 117). Possible SE related to morphine that was restarted during last hospitalization 9/20/24 Rx after previous Rx 6/14 for MSER was initiated and stopped due to suspected similar side effects. It was unclear if it was the morphine or gabapentin that may have been responsible at the time. He was started on OxyContin during prior hospitalization but cost for Rx at discharge was prohibitive (>$400/mo).   Above information shared with oncology and hospitalist for insight as to other possible rationale for symptoms (recent chemo 9/23, generalized inflammatory process identified on CT, infections, disease progression/mets?). Considering stopping morphine and transitioning to fentanyl patch. Patient agreeable. Will await input from the team prior to initiating any changes.   Awaiting pending tests today, abd US and paracentesis.     Advance Care Planning counseling and discussion:  HCPOA:  Document on file Yes [x] No []. Requested for file []  Healthcare Agent Appointed: Yes  Healthcare Agent's Name: Alysha Mix  Healthcare Agent's Phone Number: [phone]  Patient's wishes noted on form:  QOL/quantity [x] prolongation [].    Code Status:  Full Code    Inpatient Problem List:   Principal Problem:    Abdominal pain, acute  Active Problems:    Hypokalemia    Abnormal CT of the chest    Lower extremity edema        Assessment and Recommendation     Goals of care: established and treatment focused   Continued treatment directed cancer care with OP chemo (new regimen started 9/23/24)  Continued evaluation and treatment of acute issues. US and paracentesis pending today  Pain control without side effects.   Advanced Care Planning:  Code Status: Full  Code  HCPOA:  Document on file Healthcare Agent's Name: Reta HO,Alysha  Symptoms/Pain  Consider transitioning to fentanyl patch from MSER in light of new/? Recurrent hallucinations and tremors. Await input from clinical team to discern other metabolic etiologies before initiating change.   Pain increased with repositioning and mobilizing in R side of abd.     Discussed today's visit with   oncology, Spouse, and hospitalist.    Palliative Care Follow Up: Palliative care team will Continue to follow while inpatient.  Palliative care follow up at discharge: Established with [facility].    Thank you for allowing Palliative Care services to participate in the care of Dontae Goodwin Diego Champion.    A total of 50 minutes were spent on this consult, which included all of the following: chart review, direct face to face contact, history taking, physical examination, counseling and coordinating care, and documentation    ARTEMIO Ray  10/1/2024  11:18 AM  Palliative Care Services      ADDENDUM:   After d/w with the team, will stop Morphine ER for now and schedule OxyIR 10mg q6hrs (equivalent to morphine 60mg daily dose). Can use IV dilaudid for breakthrough as he has been receiving. He has tolerated both these meds in the past without side effects. Continue to monitor pain control and side effects including labs.     ARTEMIO Ray  Palliative Care   Phone: [phone]     10/1/2024  12:54 PM        The 21st Century Cures Act makes medical notes like these available to patients in the interest of transparency. Please be advised this is a medical document. Medical documents are intended to carry relevant information, facts as evident, and the clinical opinion of the practitioner. The medical note is intended as peer to peer communication and may appear blunt or direct. It is written in medical language and may contain abbreviations or verbiage that are unfamiliar.

## 2024-10-02 ENCOUNTER — APPOINTMENT (OUTPATIENT)
Dept: NUCLEAR MEDICINE | Facility: HOSPITAL | Age: 54
End: 2024-10-02
Attending: INTERNAL MEDICINE
Payer: COMMERCIAL

## 2024-10-02 ENCOUNTER — APPOINTMENT (OUTPATIENT)
Dept: ULTRASOUND IMAGING | Facility: HOSPITAL | Age: 54
End: 2024-10-02
Payer: COMMERCIAL

## 2024-10-02 PROBLEM — R65.21 SEPTIC SHOCK (HCC): Status: ACTIVE | Noted: 2024-10-02

## 2024-10-02 PROBLEM — A41.9 SEPTIC SHOCK (HCC): Status: ACTIVE | Noted: 2024-01-01

## 2024-10-02 PROBLEM — K81.9 ACALCULOUS CHOLECYSTITIS: Status: ACTIVE | Noted: 2024-10-02

## 2024-10-02 PROBLEM — K81.9 ACALCULOUS CHOLECYSTITIS: Status: ACTIVE | Noted: 2024-01-01

## 2024-10-02 PROBLEM — A41.4 KLEBSIELLA PNEUMONIAE SEPSIS (HCC): Status: ACTIVE | Noted: 2024-10-02

## 2024-10-02 PROBLEM — C78.89: Status: ACTIVE | Noted: 2024-01-01

## 2024-10-02 PROBLEM — E43 SEVERE MALNUTRITION (HCC): Status: ACTIVE | Noted: 2024-01-01

## 2024-10-02 PROBLEM — E43 SEVERE MALNUTRITION (HCC): Status: ACTIVE | Noted: 2024-10-02

## 2024-10-02 PROBLEM — A41.4 KLEBSIELLA PNEUMONIAE SEPSIS (HCC): Status: ACTIVE | Noted: 2024-01-01

## 2024-10-02 PROBLEM — A41.9 SEPTIC SHOCK (HCC): Status: ACTIVE | Noted: 2024-10-02

## 2024-10-02 PROBLEM — C78.89: Status: ACTIVE | Noted: 2024-10-02

## 2024-10-02 PROBLEM — R65.21 SEPTIC SHOCK (HCC): Status: ACTIVE | Noted: 2024-01-01

## 2024-10-02 LAB
ALBUMIN SERPL-MCNC: 2.4 G/DL (ref 3.2–4.8)
ALBUMIN/GLOB SERPL: 0.9 {RATIO} (ref 1–2)
ALP LIVER SERPL-CCNC: 138 U/L
ALT SERPL-CCNC: 30 U/L
AMMONIA PLAS-MCNC: 17 UMOL/L (ref 11–32)
ANION GAP SERPL CALC-SCNC: 5 MMOL/L (ref 0–18)
AST SERPL-CCNC: 27 U/L (ref ?–34)
BACTERIA BLD CULT: POSITIVE
BACTERIA BLD CULT: POSITIVE
BASOPHILS # BLD AUTO: 0.03 X10(3) UL (ref 0–0.2)
BASOPHILS NFR BLD AUTO: 0.2 %
BILIRUB SERPL-MCNC: 1.1 MG/DL (ref 0.3–1.2)
BUN BLD-MCNC: 33 MG/DL (ref 9–23)
CALCIUM BLD-MCNC: 8.5 MG/DL (ref 8.7–10.4)
CHLORIDE SERPL-SCNC: 104 MMOL/L (ref 98–112)
CO2 SERPL-SCNC: 24 MMOL/L (ref 21–32)
CREAT BLD-MCNC: 1.58 MG/DL
DEPRECATED HBV CORE AB SER IA-ACNC: 791 NG/ML
EGFRCR SERPLBLD CKD-EPI 2021: 52 ML/MIN/1.73M2 (ref 60–?)
EOSINOPHIL # BLD AUTO: 0 X10(3) UL (ref 0–0.7)
EOSINOPHIL NFR BLD AUTO: 0 %
ERYTHROCYTE [DISTWIDTH] IN BLOOD BY AUTOMATED COUNT: 14.6 %
GLOBULIN PLAS-MCNC: 2.6 G/DL (ref 2–3.5)
GLUCOSE BLD-MCNC: 128 MG/DL (ref 70–99)
HCT VFR BLD AUTO: 26.5 %
HGB BLD-MCNC: 8.7 G/DL
IMM GRANULOCYTES # BLD AUTO: 0.26 X10(3) UL (ref 0–1)
IMM GRANULOCYTES NFR BLD: 1.8 %
INR BLD: 2.6 (ref 0.8–1.2)
IRON SATN MFR SERPL: 3 %
IRON SERPL-MCNC: 5 UG/DL
LYMPHOCYTES # BLD AUTO: 0.41 X10(3) UL (ref 1–4)
LYMPHOCYTES NFR BLD AUTO: 2.9 %
MCH RBC QN AUTO: 30.2 PG (ref 26–34)
MCHC RBC AUTO-ENTMCNC: 32.8 G/DL (ref 31–37)
MCV RBC AUTO: 92 FL
MONOCYTES # BLD AUTO: 1 X10(3) UL (ref 0.1–1)
MONOCYTES NFR BLD AUTO: 7.1 %
MRSA DNA SPEC QL NAA+PROBE: NEGATIVE
NEUTROPHILS # BLD AUTO: 12.48 X10 (3) UL (ref 1.5–7.7)
NEUTROPHILS # BLD AUTO: 12.48 X10(3) UL (ref 1.5–7.7)
NEUTROPHILS NFR BLD AUTO: 88 %
OSMOLALITY SERPL CALC.SUM OF ELEC: 285 MOSM/KG (ref 275–295)
PLATELET # BLD AUTO: 203 10(3)UL (ref 150–450)
POTASSIUM SERPL-SCNC: 3.3 MMOL/L (ref 3.5–5.1)
POTASSIUM SERPL-SCNC: 3.3 MMOL/L (ref 3.5–5.1)
PROT SERPL-MCNC: 5 G/DL (ref 5.7–8.2)
PROTHROMBIN TIME: 28.1 SECONDS (ref 11.6–14.8)
RBC # BLD AUTO: 2.88 X10(6)UL
SODIUM SERPL-SCNC: 133 MMOL/L (ref 136–145)
TOTAL IRON BINDING CAPACITY: 161 UG/DL (ref 250–425)
TRANSFERRIN SERPL-MCNC: 108 MG/DL (ref 215–365)
WBC # BLD AUTO: 14.2 X10(3) UL (ref 4–11)

## 2024-10-02 PROCEDURE — 99291 CRITICAL CARE FIRST HOUR: CPT | Performed by: EMERGENCY MEDICINE

## 2024-10-02 PROCEDURE — 76705 ECHO EXAM OF ABDOMEN: CPT

## 2024-10-02 PROCEDURE — 99232 SBSQ HOSP IP/OBS MODERATE 35: CPT | Performed by: NURSE PRACTITIONER

## 2024-10-02 PROCEDURE — 78227 HEPATOBIL SYST IMAGE W/DRUG: CPT | Performed by: INTERNAL MEDICINE

## 2024-10-02 PROCEDURE — 99232 SBSQ HOSP IP/OBS MODERATE 35: CPT | Performed by: CLINICAL NURSE SPECIALIST

## 2024-10-02 PROCEDURE — 99233 SBSQ HOSP IP/OBS HIGH 50: CPT | Performed by: INTERNAL MEDICINE

## 2024-10-02 RX ORDER — OXYCODONE HYDROCHLORIDE 5 MG/1
5 TABLET ORAL EVERY 8 HOURS
Status: DISCONTINUED | OUTPATIENT
Start: 2024-10-02 | End: 2024-10-04

## 2024-10-02 RX ORDER — BACLOFEN 5 MG/1
5 TABLET ORAL 2 TIMES DAILY
Status: DISCONTINUED | OUTPATIENT
Start: 2024-10-02 | End: 2024-10-12

## 2024-10-02 RX ORDER — ALBUTEROL SULFATE 0.83 MG/ML
SOLUTION RESPIRATORY (INHALATION)
Qty: 225 ML | Refills: 0 | OUTPATIENT
Start: 2024-10-02

## 2024-10-02 RX ORDER — MORPHINE SULFATE 2 MG/ML
3.1 INJECTION, SOLUTION INTRAMUSCULAR; INTRAVENOUS ONCE
Status: COMPLETED | OUTPATIENT
Start: 2024-10-02 | End: 2024-10-02

## 2024-10-02 RX ORDER — SODIUM CHLORIDE, SODIUM LACTATE, POTASSIUM CHLORIDE, CALCIUM CHLORIDE 600; 310; 30; 20 MG/100ML; MG/100ML; MG/100ML; MG/100ML
INJECTION, SOLUTION INTRAVENOUS CONTINUOUS
Status: ACTIVE | OUTPATIENT
Start: 2024-10-02 | End: 2024-10-02

## 2024-10-02 RX ORDER — KETOROLAC TROMETHAMINE 15 MG/ML
15 INJECTION, SOLUTION INTRAMUSCULAR; INTRAVENOUS ONCE AS NEEDED
Status: COMPLETED | OUTPATIENT
Start: 2024-10-02 | End: 2024-10-02

## 2024-10-02 NOTE — PLAN OF CARE
Received pt A&Ox4 on 2L NC with levo infusing. Levo stopped around 0815. C/o RUQ pain; PRN dilaudid given per order. Voids via urinal, monitoring output. Up in chair. Afebrile. VSS. NSR on monitor. Spouse at bedside and updated on plan of care. See flowsheets. See MAR. Has transfer orders.

## 2024-10-02 NOTE — CM/SW NOTE
Order received/acknowldedged for POLST.  PC APN following and aware document will need to be completed prior to DC    / to remain available for support and/or discharge planning.     Brenda HERNANDEZA MSN, RN CTL/  [id number]

## 2024-10-02 NOTE — PROGRESS NOTES
Hem/Onc Inpatient Note    Patient Name: Dontae Cortez Jr.   YOB: 1970   Medical Record Number: [id number]   CSN: [id number]   Attending Hematology/Oncology Physician: Dr. Senia Foster      The 21st Century Cures Act makes medical notes like these available to patients in the interest of transparency. Please be advised this is a medical document. Medical documents are intended to carry relevant information, facts as evident, and the clinical opinion of the practitioner. The medical note is intended as peer to peer communication and may appear blunt or direct. It is written in medical language and may contain abbreviations or verbiage that are unfamiliar.     S:     Patient is doing ok this morning, denies any further hallucinations.     Allergies:  Allergies   Allergen Reactions    Oxaliplatin HIVES     Shaking        Scheduled Medications:   potassium chloride  40 mEq Intravenous Once    vancomycin  125 mg Oral Daily    cefTRIAXone  2 g Intravenous Q24H    [Held by provider] oxyCODONE  10 mg Oral q6h    [Held by provider] apixaban  5 mg Oral BID    [Held by provider] baclofen  10 mg Oral TID    OLANZapine  5 mg Oral Nightly    pantoprazole  40 mg Oral Before breakfast    pregabalin  25 mg Oral BID    sertraline  200 mg Oral Daily       PRN Medications:    heparin    acetaminophen    melatonin    glycerin-hypromellose-    sodium chloride    ondansetron    prochlorperazine    HYDROmorphone **OR** HYDROmorphone **OR** HYDROmorphone      Review of Systems: A comprehensive 10 point review of systems was completed.  Pertinent positives and negatives noted in the the HPI.    Physical Examination:   General: Patient is alert and oriented x 3, not in acute distress.  Vital Signs: /63   Pulse 84   Temp 97.4 °F (36.3 °C) (Temporal)   Resp 18   Ht 1.88 m (6' 2\")   Wt 78.1 kg (172 lb 2.9 oz)   SpO2 97%   BMI 22.11 kg/m²   HEENT: PERRL. Oropharynx is clear.   Chest: Clear to  auscultation.  Heart: Regular rate and rhythm.   Abdomen: Soft, non tender with present bowel sounds.  Extremities:  (+) bilateral 2-3+ pitting edema  Neurological: Grossly intact.   Psych/Depression: mood and affect are appropriate     Labs:  Recent Results (from the past 24 hour(s))   Ammonia, Plasma    Collection Time: 10/01/24 11:59 AM   Result Value Ref Range    Ammonia 35 (H) 11 - 32 umol/L   ABG PANEL W ELECT AND LACTATE    Collection Time: 10/01/24  2:12 PM   Result Value Ref Range    ABG pH 7.40 7.35 - 7.45    ABG pCO2 43 35 - 45 mm Hg    ABG pO2 79 (L) 80 - 100 mm Hg    ABG HCO3 26.1 21.0 - 27.0 mEq/L    ABG Base Excess 1.6 -2.0 - 2.0 mmol/L    Arterial Blood Gas O2Hb 93.5 92.0 - 100.0 %    Patient Temperature 98.6 F    Total Hemoglobin 8.6 (L) 13.0 - 17.5 g/dL    Carboxyhemoglobin 1.0 0.0 - 3.0 % SAT    Methemoglobin 0.0 (L) 0.4 - 1.5 % SAT    Ionized Calcium 1.24 0.95 - 1.32 mmol/L    Allens Test Positive     Sample Site Left Radial     L/M 2.0 L/min    ABG Device Nasal cannula     Potassium Blood Gas 3.9 3.6 - 5.1 mmol/L    Sodium Blood Gas 130 (L) 135 - 145 mmol/L    Lactic Acid (Blood Gas) 1.1 0.5 - 2.0 mmol/L   Emergency MRSA Screen by PCR    Collection Time: 10/02/24  3:09 AM   Result Value Ref Range    MRSA Screen By PCR Negative Negative   Potassium    Collection Time: 10/02/24  4:30 AM   Result Value Ref Range    Potassium 3.3 (L) 3.5 - 5.1 mmol/L   Comp Metabolic Panel (14)    Collection Time: 10/02/24  4:30 AM   Result Value Ref Range    Glucose 128 (H) 70 - 99 mg/dL    Sodium 133 (L) 136 - 145 mmol/L    Potassium 3.3 (L) 3.5 - 5.1 mmol/L    Chloride 104 98 - 112 mmol/L    CO2 24.0 21.0 - 32.0 mmol/L    Anion Gap 5 0 - 18 mmol/L    BUN 33 (H) 9 - 23 mg/dL    Creatinine 1.58 (H) 0.70 - 1.30 mg/dL    Calcium, Total 8.5 (L) 8.7 - 10.4 mg/dL    Calculated Osmolality 285 275 - 295 mOsm/kg    eGFR-Cr 52 (L) >=60 mL/min/1.73m2    AST 27 <34 U/L    ALT 30 10 - 49 U/L    Alkaline Phosphatase 138 (H) 45  - 117 U/L    Bilirubin, Total 1.1 0.3 - 1.2 mg/dL    Total Protein 5.0 (L) 5.7 - 8.2 g/dL    Albumin 2.4 (L) 3.2 - 4.8 g/dL    Globulin  2.6 2.0 - 3.5 g/dL    A/G Ratio 0.9 (L) 1.0 - 2.0   Prothrombin Time (PT)    Collection Time: 10/02/24  4:31 AM   Result Value Ref Range    PT 28.1 (H) 11.6 - 14.8 seconds    INR 2.60 (H) 0.80 - 1.20   CBC With Differential With Platelet    Collection Time: 10/02/24  4:31 AM   Result Value Ref Range    WBC 14.2 (H) 4.0 - 11.0 x10(3) uL    RBC 2.88 (L) 4.30 - 5.70 x10(6)uL    HGB 8.7 (L) 13.0 - 17.5 g/dL    HCT 26.5 (L) 39.0 - 53.0 %    .0 150.0 - 450.0 10(3)uL    MCV 92.0 80.0 - 100.0 fL    MCH 30.2 26.0 - 34.0 pg    MCHC 32.8 31.0 - 37.0 g/dL    RDW 14.6 %    Neutrophil Absolute Prelim 12.48 (H) 1.50 - 7.70 x10 (3) uL    Neutrophil Absolute 12.48 (H) 1.50 - 7.70 x10(3) uL    Lymphocyte Absolute 0.41 (L) 1.00 - 4.00 x10(3) uL    Monocyte Absolute 1.00 0.10 - 1.00 x10(3) uL    Eosinophil Absolute 0.00 0.00 - 0.70 x10(3) uL    Basophil Absolute 0.03 0.00 - 0.20 x10(3) uL    Immature Granulocyte Absolute 0.26 0.00 - 1.00 x10(3) uL    Neutrophil % 88.0 %    Lymphocyte % 2.9 %    Monocyte % 7.1 %    Eosinophil % 0.0 %    Basophil % 0.2 %    Immature Granulocyte % 1.8 %   Ammonia, Plasma    Collection Time: 10/02/24  4:31 AM   Result Value Ref Range    Ammonia 17 11 - 32 umol/L   Scan slide    Collection Time: 10/02/24  4:31 AM   Result Value Ref Range    Slide Review       Slide reviewed, normal WBC, RBC, and platelet morphology.       Radiology:    US ABDOMEN DOPPLER ONLY (CPT=93975)    Result Date: 10/1/2024  CONCLUSION:  Normal flow in hepatic and portal veins.   LOCATION:  [facility]    Dictated by (CST): Tim Erwin MD on 10/01/2024 at 6:32 PM     Finalized by (CST): Tim Erwin MD on 10/01/2024 at 6:33 PM       XR CHEST AP PORTABLE  (CPT=71045)    Result Date: 10/1/2024  CONCLUSION:  1. Subtle opacity within the right upper lobe which may represent pneumonia. 2. Mild  interstitial opacities which may represent mild edema. 3. Bibasilar atelectasis/infiltrates, left greater than right. 4. Trace bilateral pleural effusions.    LOCATION:  [id number]      Dictated by (CST): Jenny Navarrete MD on 10/01/2024 at 2:52 PM     Finalized by (CST): Jenny Navarrete MD on 10/01/2024 at 2:53 PM          Impression/Plan    Klebsiella pneumoniae Bacteremia: ID following. Possibly from recent CBD stent. On ceftriaxone. Ok to remove port if needed as he is on weekly taxol for his chemotherapy     Trembling/Hallucinations: MR Brain ordered, resolved today     Ascites: Plan for Paracentesis once INR is down. GI is following, US doppler abdomen (-), now US abdomen limited completed this am, results in process     Metastatic esophageal cancer  -Most recent imaging is consistent with metastatic disease progression.  Repeat HER2 is negative on FISH. We discussed that he will not be a good candidate for Cyramza due to history of esophageal fistula and PE/DVT. He is on weekly paclitaxel and received cycle 1 day 1 on 9/23/24.      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 CTA from 09/2024 shows resolution Ok to hold for procedures. Note that INR elevation is likely from Eliquis, now down-trending.      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 He is s/p ERCP on 9/17/24 with CBD, PD stent placement.      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 Now s/p repair 5/1/24.     Anemia: 2/2 chemotherapy & malignancy; check iron/TIBC/Ferritin now     Appreciated palliative care consultation     Case discussed with Dr. Foster, who is in agreement with plan as outlined above.      Please do not hesitate to reach out with any additional questions or concerns.    Electronically Signed by:    Taylor Bolanos, ROGER, AOCNP, AGPCNP-BC  Nurse Practitioner  Hematology and Oncology

## 2024-10-02 NOTE — PROGRESS NOTES
INFECTIOUS DISEASE  PROGRESS NOTE            [facility]    Dontae Goodwin Diego Champion Patient Status:  Inpatient    10/15/1970 MRN [id number]   Formerly [facility] 4SW-A Attending Sakina Hernandez MD   Hosp Day # 2 PCP Adrian Horowitz MD     Antibiotics: #2  Cefazolin #1    Subjective:  : resting comfortable    Objective:  Temp:  [97.4 °F (36.3 °C)-99.1 °F (37.3 °C)] 97.4 °F (36.3 °C)  Pulse:  [61-91] 84  Resp:  [6-28] 18  BP: ()/(45-79) 120/63  SpO2:  [85 %-100 %] 97 %    General: awake alert  Vital signs:Temp:  [97.4 °F (36.3 °C)-99.1 °F (37.3 °C)] 97.4 °F (36.3 °C)  Pulse:  [61-91] 84  Resp:  [6-28] 18  BP: ()/(45-79) 120/63  SpO2:  [85 %-100 %] 97 %  HEENT: Moist mucous membranes. Extraocular muscles are intact.  Neck: supple no masses  Respiratory: Non labored, symmetric excursion, normal respirations  Port intact  Cardiovascular: no irregularities in rhythm  Abdomen: Soft, nontender, nondistended.   Musculoskeletal: joints: no swelling   Integument: No lesions. No erythema. No open wounds.  Labs:     COVID-19 Lab Results    COVID-19  Lab Results   Component Value Date    COVID19 Not Detected 2024    COVID19 Not Detected 2024    COVID19 Not Detected 2024       Pro-Calcitonin  Recent Labs   Lab 10/01/24  0625   PCT 3.06*       Cardiac  No results for input(s): \"TROP\", \"PBNP\" in the last 168 hours.    Creatinine Kinase  No results for input(s): \"CK\" in the last 168 hours.    Inflammatory Markers  Recent Labs   Lab 24  0637   CRP 22.40*       Recent Labs   Lab 10/02/24  0431   RBC 2.88*   HGB 8.7*   HCT 26.5*   MCV 92.0   MCH 30.2   MCHC 32.8   RDW 14.6   NEPRELIM 12.48*   WBC 14.2*   .0         Recent Labs   Lab 24  0637 24  1616 10/01/24  0625 10/02/24  0430   *  --  125* 128*   BUN 13  --  21 33*   CREATSERUM 0.56*  --  1.41* 1.58*   CA 8.1*  --  8.4* 8.5*   ALB 1.9*  --  2.9* 2.4*   *  --  136 133*   K 2.6* 3.3* 3.7  3.7 3.3*   3.3*     --  104 104   CO2 27.0  --  26.0 24.0   ALKPHO 172*  --  159* 138*   AST 27  --  21 27   ALT 37  --  30 30   BILT 1.3  --  1.3* 1.1   TP 5.6*  --  5.3* 5.0*       No results found for: \"VANCT\"  Microbiology    Hospital Encounter on 09/30/24   1. Blood Culture     Status: Abnormal    Collection Time: 09/30/24  9:34 AM    Specimen: Blood,peripheral   Result Value Ref Range    Blood Culture Result Positive N/A    Blood Culture Result Klebsiella pneumoniae (A) N/A    Blood Smear Positive Blood Culture (A) N/A    Blood Smear Gram Negative Rods (A) N/A         Problem list reviewed:  Patient Active Problem List   Diagnosis    Primary hypertension    Hyperlipidemia with target low density lipoprotein (LDL) cholesterol less than 70 mg/dL    Coronary artery disease involving coronary bypass graft of native heart with angina pectoris (HCC)    S/P CABG x 4    Nontoxic multinodular goiter    Pulmonary nodules    Thrombophlebitis leg    BRANDAN (generalized anxiety disorder)    Right shoulder Arthroscopy acromioplasty ,distal  clavicle resection, rotator cuff/labral debridment  Global 05/13/2021    Right bicipital tenosynovitis    Malignant neoplasm of esophagus (HCC)    Pleural effusion    Esophageal anastomotic leak    Esophageal obstruction    On total parenteral nutrition (TPN)    Mechanical complication of esophagostomy (HCC)    Abdominal pain of unknown etiology    Migration of esophageal stent    Gastroparesis    Esophageal carcinoma (HCC)    Normocytic anemia    Esophageal fistula    Subacute cough    Acquired bronchoesophageal fistula (HCC)    Pneumonia of both lower lobes due to infectious organism    Malignant neoplasm of pancreas (HCC)    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 difficile colitis    Dysphagia, unspecified type    Dyspnea and respiratory abnormalities    Hypokalemia    Dysphagia    Narcotic drug use    History of esophageal cancer    Palliative care by specialist    Neoplasm related pain    Endobronchial mass    Hyponatremia    Thrombocytopenia (HCC)    Acute kidney injury (HCC)    Hyperglycemia    Aspiration pneumonitis (HCC)    C. difficile diarrhea    Aspiration pneumonia (HCC)    Malignant neoplasm metastatic to liver (HCC)    Hyperlipidemia    Nausea vomiting and diarrhea    Fistula, bronchoesophageal (HCC)    Iron deficiency anemia, unspecified iron deficiency anemia type    Azotemia    Palliative care encounter    Goals of care, counseling/discussion    Cancer related pain    Anemia    Fistula    Acute cough    Pneumonia of right lung due to infectious organism, unspecified part of lung    Bacteremia    Acute postoperative pain    Exocrine pancreatic insufficiency (HCC)    Hypertriglyceridemia    Acute pulmonary embolism without acute cor pulmonale, unspecified pulmonary embolism type (HCC)    Acute deep vein thrombosis (DVT) of popliteal vein of right lower extremity (HCC)    Altered mental status, unspecified altered mental status type    Transaminitis    Biliary obstruction (HCC)    Hyperbilirubinemia    Esophageal adenocarcinoma (HCC)    Cancer associated pain    Abdominal pain, acute    Abnormal CT of the chest    Lower extremity edema    Abnormal finding of diagnostic imaging             ASSESSMENT/PLAN:  1. Klebsiella pneumoniae bacteremia  -underlying metastatic esophageal cancer on chemo, port in place  -recent PD stent on 9/17 for malignant obstruction  Admitted 9/30 abdominal pain  -CT showing non specific findings, mild ascites, gallbladder thickening, colon thickening, has no diarrhea     LFTS not elevated     --concern for billiary source with recent stent  -concern of  potential port a cath infection     US pending, GI following  HIDA  Ceftriaxone replaced with cefazolin         Conner Bell MD, MD  [facility]  [phone]

## 2024-10-02 NOTE — PROGRESS NOTES
Patient was stable overnight with no acute overnight events reported.  He was weaned off of norepinephrine this morning.  He was awake and alert now following commands had been markedly more sleepy in the past.    I started the patient's pain meds with his oxycodone and also started him back on half of his dose of baclofen.  He had been all held because of acute kidney injury and the patient's delirium and altered mental status.    Today he is very awake alert and following all commands.    On exam he still has tenderness to palpation in the right upper quadrant.  He has some edema in his lower extremities but has pulses in bilateral lower and upper extremities.  He is breathing comfortably answering questions.    Does state that he has about a 6 out of 10 pain currently even after getting some pain control although he does not need anything else for pain at this time.    His labs this morning are showing a sodium of 133 potassium 3.3 creatinine of 1.58.  AST and ALT are unremarkable.  INR is 2.6 hemoglobin is 8.7 white blood cell count is 14,000 and his platelet count is currently 203,000.  His cultures as mentioned from the microbiology data on the 30th did grow Klebsiella of which the sensitivities were sensitive to cefazolin so his antibiotics were de-escalated to Ancef this morning.    In summary 53-year-old male presents to us with stage IV esophageal cancer and septic shock    Problems  Septic shock  Acute hypoxic respiratory failure  Gram-negative bacteremia with Klebsiella  Possible cholecystitis  Diffuse metastatic disease  Debility  Chronic pain  Severe protein calorie malnutrition  History of cholangitis status post stenting  History of C. difficile colitis  Endobronchial mass  Thrombocytopenia  Acute kidney injury with acute renal failure  Deep vein thrombosis  Pulmonary embolism previously    Plan  Neuro  Patient's pain control will be with oxycodone 5 mg every 8 hours  He previously had been on 10  L  of IV Dilaudid as needed in between I have reduced the total dose because of the patient's acute kidney injury    Dilaudid has been ordered will attempt to give lower doses of IV pain medications as the patient can tolerate    Will continue to follow this closely  We can uptitrate if his renal function starts to improve and his pain is not controlled.    Palliative care is following along    Cardiovascular  He is no longer in shock  He was fluid resuscitated he was profoundly dehydrated yesterday.    This has improved he has improved overall from a fluid balance standpoint.  I would put him on LR infusion at 100 cc/h for 10 hours as he is not able to take full p.o. at this time    Patient has a history of DVT PE  Apixaban may be restarted depending on what the GI doctors and infectious disease doctors believe regarding whether or not he may need an IR drain for this possible cholecystitis.    Respiratory  No active respiratory issues at this time    GI  He did have Klebsiella bacteremia typically a gram-negative source  Concern of whether this was from a cholecystitis or from cholangitis  Did have an ERCP  Does not appear to have any biliary ductal dilatation on CT scan his bilirubin is normal his LFTs are normal this does not seem to be a stent problem at this time.    He does have wall thickening about his gallbladder he has pericholecystic fluid and there is concerns that he has acute cholecystitis.  I will reach out to gastroenterology and infectious disease to see if we should consider putting an IR drain in.  If they feel this is more of a chronic situation and not acute cholecystitis then we will just continue on broad-spectrum IV antibiotics with Ancef.    Renal  Acute kidney injury creatinine 1.5  He is getting down salt solutions  He is dehydrated despite having total body volume overload due to some hypoalbuminemia and debility overall.    Infectious disease as above    Endocrine  Insulin sliding  scale    A long conversation was had with myself and the wife yesterday ultimately she does not want to see the patient suffer.  She agreed that the patient should not have aggressive resuscitation and be made a DO NOT INTUBATE DO NOT RESUSCITATE.  She would be a DNAR select, would not want him to be kept alive by machines.    Prophylaxis  Debating on starting Eliquis for his history of DVT and PE  GI prophylaxis with Protonix    Tubes lines and drains  Peripheral IVs  Stable for transfer to medical floor pending discussion about acute cholecystitis    Critical care attending    39 minutes of critical care time were spent at the bedside performing history, physical, complex data interpretation, radiographic interpretation, discussion with consultants, and creating a diagnostic and therapeutic treatment plan for this critically ill patient. Time spent was excluding time on procedures and resident teaching.    Date of Service  October second 2024

## 2024-10-02 NOTE — PROGRESS NOTES
[facility]   part of [facility]     Hospitalist Progress Note     Dontae Cortez . Patient Status:  Inpatient    10/15/1970 MRN [id number]   [facility] 4NW-A Attending Nixon Vergara MD   Hosp Day # 2 PCP Adrian Horowitz MD     Chief Complaint: abdominal pain    Subjective:     Patient seen with patient's wife at bedside.  Complains of right upper quadrant abdominal pain.  No nausea vomiting.  Seen with RN at bedside.  Discussed with intensivist    Objective:    Review of Systems:   A comprehensive review of systems was completed; pertinent positive and negatives stated in subjective.    Vital signs:  Temp:  [97.4 °F (36.3 °C)-99.1 °F (37.3 °C)] 98.4 °F (36.9 °C)  Pulse:  [] 101  Resp:  [6-28] 23  BP: ()/(45-79) 129/77  SpO2:  [88 %-100 %] 95 %    Physical Exam:    /58   Pulse 74   Temp 98.5 °F (36.9 °C) (Temporal)   Resp 12   Ht 6' 2\" (1.88 m)   Wt 172 lb 2.9 oz (78.1 kg)   SpO2 99%   BMI 22.11 kg/m²     General: No acute distress  Respiratory: No wheezes, no rhonchi  Cardiovascular: S1, S2, regular rate and rhythm  Abdomen: Soft, TTP, non-distended, positive bowel sounds  Neuro: No new focal deficits.   Extremities: No edema      Peripheral Pulses:  Radial: Right 1+ or Left 1+      Capillary Refill:  <3 Secs    Skin:  Temp/Moisture: Warm and Dry  Color: Normal            Diagnostic Data:    Labs:  Recent Labs   Lab 24  0637 10/01/24  0625 10/01/24  0811 10/02/24  0431   WBC 11.9* 13.2*  --  14.2*   HGB 9.9* 9.4*  --  8.7*   MCV 93.7 90.6  --  92.0   .0 235.0  --  203.0   INR  --   --  4.19* 2.60*       Recent Labs   Lab 24  0637 24  1616 10/01/24  0625 10/02/24  043   *  --  125* 128*   BUN 13  --  21 33*   CREATSERUM 0.56*  --  1.41* 1.58*   CA 8.1*  --  8.4* 8.5*   ALB 1.9*  --  2.9* 2.4*   *  --  136 133*   K 2.6* 3.3* 3.7  3.7 3.3*  3.3*     --  104 104   CO2 27.0  --  26.0 24.0   ALKPHO 172*  --  159* 138*    AST 27  --  21 27   ALT 37  --  30 30   BILT 1.3  --  1.3* 1.1   TP 5.6*  --  5.3* 5.0*       Estimated Creatinine Clearance: 59 mL/min (A) (based on SCr of 1.58 mg/dL (H)).    No results for input(s): \"TROP\", \"TROPHS\", \"CK\" in the last 168 hours.    Recent Labs   Lab 10/01/24  0811 10/02/24  0431   PTP 40.7* 28.1*   INR 4.19* 2.60*                  Microbiology    Hospital Encounter on 09/30/24   1. Blood Culture     Status: Abnormal    Collection Time: 09/30/24  9:34 AM    Specimen: Blood,peripheral   Result Value Ref Range    Blood Culture Result Positive N/A    Blood Culture Result Klebsiella pneumoniae (A) N/A    Blood Smear Positive Blood Culture (A) N/A    Blood Smear Gram Negative Rods (A) N/A         Imaging: Reviewed in Epic.    Medications:    ceFAZolin  2 g Intravenous Q8H    baclofen  5 mg Oral BID    oxyCODONE  5 mg Oral Q8H    vancomycin  125 mg Oral Daily    [Held by provider] apixaban  5 mg Oral BID    OLANZapine  5 mg Oral Nightly    pantoprazole  40 mg Oral Before breakfast    pregabalin  25 mg Oral BID    sertraline  200 mg Oral Daily       Assessment & Plan:      # Klebsiella pneumonia sepsis on admission with transient septic shock on 10/1/2024  #Abdominal pain, possible acalculous cholecystitis  -ESR, CRP elevated on admission  -On IV antibiotics-was on IV Zosyn on 9/30/2024, then on IV Rocephin,  which was then changed to IV Ancef per ID based on sensitivity  - transferred to ICU by my colleague Dr. Arshad on 10/1/2024 for hypotension and was on pressor support with Levophed.  ABG with lactate done on 10/1/2024 at the time of transfer to ICU showed lactic acid of 1.1.  Patient received 500 cc of normal saline bolus at the time of transfer to ICU on 10/1/2024, did not receive full sepsis bolus as patient already hypoxic requiring 2 L of oxygen by nasal cannula at that time. Patient did not receive 30ml/kg of fluids despite having: hypotension. The reason for doing so is: a concern for fluid  overload. 500mL of fluids were given instead   - blood pressure improved on 10/2/2024 and weaned off of Levophed pressor support on a.m. of 10/2/2024.  Seen by palliative care -patient DNAR/selective treatment  US abdomen Doppler done on 10/1/2024 with normal zeinab in hepatic and portal veins.  Ultrasound abdomen done on 10/2/2024 shows trace perihepatic ascites and fluid demonstrating loculation and septations.  No significant fluid elsewhere within the abdomen.  GI and ID following.  ID plans HIDA scan, if this positive will also have surgeon on consult for possible cholecystostomy tube, patient at this time wants to continue selective treatment including cholecystostomy tube if needed,     #Metastatic esophageal cancer with previous resection and gastroesophageostomy  # History of prior bronchoesophageal fistula  # pancreatic mets  #Liver and pulmonary mets  # Anemia secondary to chemotherapy and malignancy  - oncology following, has had outpatient chemotherapy     #H/o PE/DVT  Eliquis     #LE swelling, likely 3rd spacing, hypoalbuminemia     #Hypokalemia, replace PRN     #Cancer related pain, per palliative  ?celiac block for pain control was considered by GI during last admission according to patient  GI following in hospital     #HTN, controlled     #DL, statin    #PUD/GERD, PPi     #CAD with prev CABG    #Depression/anxiety, home meds resumed    #TREVOR, due to above, ATN, start gentle hydration/albumin, monitor    #Coagulopathy, due to liver mets, also on DOAC  -DOAC being held in case would need intervention or cholecystostomy tube for possible cholecystitis    #Leukocytosis due to Klebsiella pneumoniae sepsis present on admission  -On IV antibiotics  -Follow CBC in a.m.     #Severe malnutrition-dietitian following     Discussed with patient, RN at bedside.  Discussed with intensivist Dr. Aman Hernandez MD        Supplementary Documentation:     Quality:  DVT Mechanical Prophylaxis:   SCDs,  Early ambuation  DVT Pharmacologic Prophylaxis   Medication    heparin (Porcine) 100 Units/mL lock flush 500 Units    [Held by provider] apixaban (Eliquis) tab 5 mg                Code Status: DNAR/Selective Treatment  Neumann: No urinary catheter in place  Neumann Duration (in days):   Central line:    SHUBHAM:     Discharge is dependent on: progress  At this point Mr. Cortez is expected to be discharge to: home    The 21st Century Cures Act makes medical notes like these available to patients in the interest of transparency. Please be advised this is a medical document. Medical documents are intended to carry relevant information, facts as evident, and the clinical opinion of the practitioner. The medical note is intended as peer to peer communication and may appear blunt or direct. It is written in medical language and may contain abbreviations or verbiage that are unfamiliar.     Dietitian Malnutrition Assessment    Evaluation for Malnutrition: Criteria for severe malnutrition diagnosis- chronic illness related to   Wt loss greater than 5% in 1 month., Body fat severe depletion., Muscle mass severe depletion.               RD Malnutrition Care Plan: Adjusted diet to least restrictive to maximize intake., Encouraged increased PO intake., Encouraged small frequent meals with emphasis on high calorie/high protein.    Body Fat/Muscle Mass:          Physician Assessment     Patient has a diagnosis of severe malnutrition

## 2024-10-02 NOTE — TELEPHONE ENCOUNTER
Call made to patient's wife. Patient is currently admitted and does not remember requesting any albuterol. Will deny script.  Patient to call if refill needed.

## 2024-10-02 NOTE — PAYOR COMM NOTE
CONTINUED STAY REVIEW    Payor: [employer] [employer]  Subscriber #:  [id number]  Authorization Number: [id number]    Admit date: 9/30/24  Admit time: 10:48 AM    REVIEW DOCUMENTATION: 10/2/2024      10/2/2024 Critical Care     Aman Lopez MD   Physician  Critical Care     Progress Notes     Signed     Date of Service: 10/2/2024 12:50 PM     Signed         Patient was stable overnight with no acute overnight events reported.  He was weaned off of norepinephrine this morning.  He was awake and alert now following commands had been markedly more sleepy in the past.     I started the patient's pain meds with his oxycodone and also started him back on half of his dose of baclofen.  He had been all held because of acute kidney injury and the patient's delirium and altered mental status.     Today he is very awake alert and following all commands.     On exam he still has tenderness to palpation in the right upper quadrant.  He has some edema in his lower extremities but has pulses in bilateral lower and upper extremities.  He is breathing comfortably answering questions.     Does state that he has about a 6 out of 10 pain currently even after getting some pain control although he does not need anything else for pain at this time.     His labs this morning are showing a sodium of 133 potassium 3.3 creatinine of 1.58.  AST and ALT are unremarkable.  INR is 2.6 hemoglobin is 8.7 white blood cell count is 14,000 and his platelet count is currently 203,000.  His cultures as mentioned from the microbiology data on the 30th did grow Klebsiella of which the sensitivities were sensitive to cefazolin so his antibiotics were de-escalated to Ancef this morning.     In summary 53-year-old male presents to us with stage IV esophageal cancer and septic shock     Problems  Septic shock  Acute hypoxic respiratory failure  Gram-negative bacteremia with Klebsiella  Possible cholecystitis  Diffuse metastatic  disease  Debility  Chronic pain  Severe protein calorie malnutrition  History of cholangitis status post stenting  History of C. difficile colitis  Endobronchial mass  Thrombocytopenia  Acute kidney injury with acute renal failure  Deep vein thrombosis  Pulmonary embolism previously     Plan  Neuro  Patient's pain control will be with oxycodone 5 mg every 8 hours  He previously had been on 10  L of IV Dilaudid as needed in between I have reduced the total dose because of the patient's acute kidney injury     Dilaudid has been ordered will attempt to give lower doses of IV pain medications as the patient can tolerate     Will continue to follow this closely  We can uptitrate if his renal function starts to improve and his pain is not controlled.     Palliative care is following along     Cardiovascular  He is no longer in shock  He was fluid resuscitated he was profoundly dehydrated yesterday.     This has improved he has improved overall from a fluid balance standpoint.  I would put him on LR infusion at 100 cc/h for 10 hours as he is not able to take full p.o. at this time     Patient has a history of DVT PE  Apixaban may be restarted depending on what the GI doctors and infectious disease doctors believe regarding whether or not he may need an IR drain for this possible cholecystitis.     Respiratory  No active respiratory issues at this time     GI  He did have Klebsiella bacteremia typically a gram-negative source  Concern of whether this was from a cholecystitis or from cholangitis  Did have an ERCP  Does not appear to have any biliary ductal dilatation on CT scan his bilirubin is normal his LFTs are normal this does not seem to be a stent problem at this time.     He does have wall thickening about his gallbladder he has pericholecystic fluid and there is concerns that he has acute cholecystitis.  I will reach out to gastroenterology and infectious disease to see if we should consider putting an IR drain in.   If they feel this is more of a chronic situation and not acute cholecystitis then we will just continue on broad-spectrum IV antibiotics with Ancef.     Renal  Acute kidney injury creatinine 1.5  He is getting down salt solutions  He is dehydrated despite having total body volume overload due to some hypoalbuminemia and debility overall.     Infectious disease as above     Endocrine  Insulin sliding scale     A long conversation was had with myself and the wife yesterday ultimately she does not want to see the patient suffer.  She agreed that the patient should not have aggressive resuscitation and be made a DO NOT INTUBATE DO NOT RESUSCITATE.  She would be a DNAR select, would not want him to be kept alive by machines.     Prophylaxis  Debating on starting Eliquis for his history of DVT and PE  GI prophylaxis with Protonix     Tubes lines and drains  Peripheral IVs  Stable for transfer to medical floor pending discussion about acute cholecystitis     Critical care attending     39 minutes of critical care time were spent at the bedside performing history, physical, complex data interpretation, radiographic interpretation, discussion with consultants, and creating a diagnostic and therapeutic treatment plan for this critically ill patient. Time spent was excluding time on procedures and resident teaching.     Date of Service  October second 2024                10/2/2024 Infectious Disease Progress Note  Antibiotics: #2  Cefazolin #1     Subjective:  : resting comfortable     Objective:  Temp:  [97.4 °F (36.3 °C)-99.1 °F (37.3 °C)] 97.4 °F (36.3 °C)  Pulse:  [61-91] 84  Resp:  [6-28] 18  BP: ()/(45-79) 120/63  SpO2:  [85 %-100 %] 97 %     General: awake alert  Vital signs:Temp:  [97.4 °F (36.3 °C)-99.1 °F (37.3 °C)] 97.4 °F (36.3 °C)  Pulse:  [61-91] 84  Resp:  [6-28] 18  BP: ()/(45-79) 120/63  SpO2:  [85 %-100 %] 97 %  HEENT: Moist mucous membranes. Extraocular muscles are intact.  Neck: supple no  masses  Respiratory: Non labored, symmetric excursion, normal respirations  Port intact  Cardiovascular: no irregularities in rhythm  Abdomen: Soft, nontender, nondistended.   Musculoskeletal: joints: no swelling   Integument: No lesions. No erythema. No open wounds.  Labs:      COVID-19 Lab Results     COVID-19        Lab Results   Component Value Date     COVID19 Not Detected 04/16/2024     COVID19 Not Detected 01/29/2024     COVID19 Not Detected 01/29/2024         Pro-Calcitonin      Recent Labs   Lab 10/01/24  0625   PCT 3.06*         Cardiac  No results for input(s): \"TROP\", \"PBNP\" in the last 168 hours.     Creatinine Kinase  No results for input(s): \"CK\" in the last 168 hours.     Inflammatory Markers      Recent Labs   Lab 09/30/24  0637   CRP 22.40*             Recent Labs   Lab 10/02/24  0431   RBC 2.88*   HGB 8.7*   HCT 26.5*   MCV 92.0   MCH 30.2   MCHC 32.8   RDW 14.6   NEPRELIM 12.48*   WBC 14.2*   .0                   Recent Labs   Lab 09/30/24  0637 09/30/24  1616 10/01/24  0625 10/02/24  0430   *  --  125* 128*   BUN 13  --  21 33*   CREATSERUM 0.56*  --  1.41* 1.58*   CA 8.1*  --  8.4* 8.5*   ALB 1.9*  --  2.9* 2.4*   *  --  136 133*   K 2.6* 3.3* 3.7  3.7 3.3*  3.3*     --  104 104   CO2 27.0  --  26.0 24.0   ALKPHO 172*  --  159* 138*   AST 27  --  21 27   ALT 37  --  30 30   BILT 1.3  --  1.3* 1.1   TP 5.6*  --  5.3* 5.0*         No results found for: \"VANCT\"  Microbiology           Hospital Encounter on 09/30/24   1. Blood Culture     Status: Abnormal     Collection Time: 09/30/24  9:34 AM     Specimen: Blood,peripheral   Result Value Ref Range     Blood Culture Result Positive N/A     Blood Culture Result Klebsiella pneumoniae (A) N/A     Blood Smear Positive Blood Culture (A) N/A     Blood Smear Gram Negative Rods (A) N/A            Problem list reviewed:      Patient Active Problem List   Diagnosis    Primary hypertension    Hyperlipidemia with target low density  lipoprotein (LDL) cholesterol less than 70 mg/dL    Coronary artery disease involving coronary bypass graft of native heart with angina pectoris (HCC)    S/P CABG x 4    Nontoxic multinodular goiter    Pulmonary nodules    Thrombophlebitis leg    BRANDAN (generalized anxiety disorder)    Right shoulder Arthroscopy acromioplasty ,distal  clavicle resection, rotator cuff/labral debridment  Global 05/13/2021    Right bicipital tenosynovitis    Malignant neoplasm of esophagus (HCC)    Pleural effusion    Esophageal anastomotic leak    Esophageal obstruction    On total parenteral nutrition (TPN)    Mechanical complication of esophagostomy (HCC)    Abdominal pain of unknown etiology    Migration of esophageal stent    Gastroparesis    Esophageal carcinoma (HCC)    Normocytic anemia    Esophageal fistula    Subacute cough    Acquired bronchoesophageal fistula (HCC)    Pneumonia of both lower lobes due to infectious organism    Malignant neoplasm of pancreas (HCC)    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 difficile colitis    Dysphagia, unspecified type    Dyspnea and respiratory abnormalities    Hypokalemia    Dysphagia    Narcotic drug use    History of esophageal cancer    Palliative care by specialist    Neoplasm related pain    Endobronchial mass    Hyponatremia    Thrombocytopenia (HCC)    Acute kidney injury (HCC)    Hyperglycemia    Aspiration pneumonitis (HCC)    C. difficile diarrhea    Aspiration pneumonia (HCC)    Malignant neoplasm metastatic to liver (HCC)    Hyperlipidemia    Nausea vomiting and diarrhea    Fistula, bronchoesophageal (HCC)    Iron deficiency anemia, unspecified iron deficiency anemia type     Azotemia    Palliative care encounter    Goals of care, counseling/discussion    Cancer related pain    Anemia    Fistula    Acute cough    Pneumonia of right lung due to infectious organism, unspecified part of lung    Bacteremia    Acute postoperative pain    Exocrine pancreatic insufficiency (HCC)    Hypertriglyceridemia    Acute pulmonary embolism without acute cor pulmonale, unspecified pulmonary embolism type (HCC)    Acute deep vein thrombosis (DVT) of popliteal vein of right lower extremity (HCC)    Altered mental status, unspecified altered mental status type    Transaminitis    Biliary obstruction (HCC)    Hyperbilirubinemia    Esophageal adenocarcinoma (HCC)    Cancer associated pain    Abdominal pain, acute    Abnormal CT of the chest    Lower extremity edema    Abnormal finding of diagnostic imaging            ASSESSMENT/PLAN:  1. Klebsiella pneumoniae bacteremia  -underlying metastatic esophageal cancer on chemo, port in place  -recent PD stent on 9/17 for malignant obstruction  Admitted 9/30 abdominal pain  -CT showing non specific findings, mild ascites, gallbladder thickening, colon thickening, has no diarrhea     LFTS not elevated     --concern for billiary source with recent stent  -concern of potential port a cath infection     US pending, GI following  HIDA  Ceftriaxone replaced with cefazolin           Conner Bell MD, MD [facility]          MEDICATIONS ADMINISTERED IN LAST 1 DAY:  ceFAZolin (Ancef) 2g in 10mL IV syringe premix       Date Action Dose Route User    10/2/2024 1143 New Bag 2 g Intravenous Marko Aaron RN          cefTRIAXone (Rocephin) 2 g in sodium chloride 0.9% 100 mL IVPB-ADDV       Date Action Dose Route User    10/1/2024 1659 New Bag 2 g Intravenous Cheryl Logan RN          HYDROmorphone (Dilaudid) 1 MG/ML injection 0.4 mg       Date Action Dose Route User    10/2/2024 1151 Given 0.4 mg Intravenous Marko Aaron RN    10/2/2024 025  Given 0.4 mg Intravenous Jose Enrique Walton, RN    10/1/2024 2208 Given 0.4 mg Intravenous Jose Enrique Walton, RN          HYDROmorphone (Dilaudid) 1 MG/ML injection 0.8 mg       Date Action Dose Route User    10/2/2024 0912 Given 0.8 mg Intravenous Marko Aaron RN          lactated ringers infusion       Date Action Dose Route User    10/2/2024 1337 New Bag (none) Intravenous Marko Aaron, DANY          norepinephrine (Levophed) 4 mg/250mL infusion premix       Date Action Dose Route User    10/2/2024 0740 Rate/Dose Change 1 mcg/min Intravenous Marko Aaron, RN    10/2/2024 0645 Rate/Dose Change 2 mcg/min Intravenous Jose Enrique Walton, RN    10/2/2024 0630 Rate/Dose Change 3 mcg/min Intravenous Jose Enrique Walton, RN    10/2/2024 0615 Rate/Dose Change 4 mcg/min Intravenous Jose Enrique Walton, RN    10/2/2024 0600 Rate/Dose Change 5 mcg/min Intravenous Jose Enrique Walton, RN    10/2/2024 0400 Rate/Dose Verify 6 mcg/min Intravenous Jose Enrique Walton, RN    10/2/2024 0255 New Bag 6 mcg/min Intravenous Jose Enrique Walton, RN    10/2/2024 0000 Rate/Dose Verify 6 mcg/min Intravenous Jose Enrique Walton, RN    10/1/2024 2000 Rate/Dose Verify 6 mcg/min Intravenous Jose Enrique Walton, RN    10/1/2024 1836 Rate/Dose Change 6 mcg/min Intravenous Cheryl Logan, RN    10/1/2024 1800 Rate/Dose Change 5 mcg/min Intravenous Cheryl Logan, RN    10/1/2024 1730 Rate/Dose Change 4 mcg/min Intravenous Cheryl Logan, RN    10/1/2024 1636 New Bag 3 mcg/min Intravenous Delmy Willson RN          OLANZapine (ZyPREXA) tab 5 mg       Date Action Dose Route User    10/1/2024 2122 Given 5 mg Oral Jose Enrique Walton RN          pantoprazole (Protonix) DR tab 40 mg       Date Action Dose Route User    10/2/2024 0807 Given 40 mg Oral Marko Aaron RN          potassium chloride 40 mEq in 250mL sodium chloride 0.9% IVPB premix       Date Action Dose Route User    10/2/2024 0553 New Bag 40 mEq Intravenous Jose Enrique Walton, RN           pregabalin (Lyrica) cap 25 mg       Date Action Dose Route User    10/2/2024 0807 Given 25 mg Oral Marko Aaron RN    10/1/2024 2122 Given 25 mg Oral ScatJose Enrique steel RN          sertraline (Zoloft) tab 200 mg       Date Action Dose Route User    10/2/2024 0807 Given 200 mg Oral Marko Aaron RN          vancomycin (Vancocin) cap 125 mg       Date Action Dose Route User    10/2/2024 0807 Given 125 mg Oral Marko Aaron RN            Vitals (last day)       Date/Time Temp Pulse Resp BP SpO2 Weight O2 Device O2 Flow Rate (L/min) [facility]    10/02/24 1545 -- 89 21 -- 95 % -- None (Room air) -- AL    10/02/24 1400 -- 93 15 107/69 98 % -- -- -- AL    10/02/24 1200 98.4 °F (36.9 °C) 101 23 129/77 95 % -- Nasal cannula 1 L/min AL    10/02/24 1154 -- -- -- -- 97 % -- Nasal cannula 1 L/min AL    10/02/24 1100 -- 119 26 123/79 95 % -- -- -- AL    10/02/24 1030 -- 101 23 114/78 91 % -- -- -- AL    10/02/24 1015 -- 110 23 135/72 92 % -- -- -- AL    10/02/24 1000 -- 85 22 115/67 88 % -- Nasal cannula 2 L/min AL    10/02/24 0945 -- 84 15 108/63 94 % -- -- -- AL    10/02/24 0930 -- 80 19 108/61 98 % -- -- -- AL    10/02/24 0915 -- 84 18 120/63 97 % -- -- -- AL    10/02/24 0914 -- 82 18 -- 98 % -- Nasal cannula 1 L/min AL    10/02/24 0900 -- 82 14 114/67 99 % -- -- -- AL    10/02/24 0845 -- 85 20 126/74 91 % -- -- -- AL    10/02/24 0830 -- 91 24 132/72 92 % -- -- -- AL    10/02/24 0815 -- 86 23 134/69 97 % -- -- -- AL    10/02/24 0800 97.4 °F (36.3 °C) 70 11 101/54 99 % -- Nasal cannula 2 L/min AL    10/02/24 0745 -- 70 10 100/58 99 % -- -- -- AL    10/02/24 0730 -- 70 11 102/58 99 % -- -- -- AL    10/02/24 0715 -- 70 9 104/59 99 % -- -- -- AL    10/02/24 0700 -- 71 10 104/56 99 % -- -- -- TS    10/02/24 0645 -- 72 10 110/58 98 % -- -- -- TS    10/02/24 0630 -- 72 11 116/65 98 % -- -- -- TS    10/02/24 0615 -- 79 13 120/63 98 % -- -- -- TS    10/02/24 0600 -- 71 15 119/59 98 % -- -- -- TS    10/02/24 0500 -- 62 8 121/66  96 % -- -- --     10/02/24 0400 99.1 °F (37.3 °C) 68 9 104/58 100 % -- Nasal cannula 2 L/min     10/02/24 0337 -- -- -- -- 97 % -- Nasal cannula 2 L/min     10/02/24 0300 -- 71 11 116/63 94 % -- -- --     10/02/24 0200 -- 65 9 103/60 97 % -- -- --     10/02/24 0100 -- 78 12 106/61 98 % -- -- --     10/02/24 0000 97.9 °F (36.6 °C) 69 7 104/48 99 % 172 lb 2.9 oz (78.1 kg) Nasal cannula 2 L/min     10/01/24 2300 -- 73 7 91/45 99 % -- -- --     10/01/24 2200 -- 86 19 138/73 91 % -- -- --     10/01/24 2100 -- 61 6 111/60 99 % -- -- --     10/01/24 2050 98 °F (36.7 °C) 65 7 111/60 99 % -- -- --     10/01/24 2000 -- 65 8 96/54 98 % -- Nasal cannula 2 L/min     10/01/24 1930 -- 67 8 99/50 98 % -- -- --     10/01/24 1845 -- 70 7 97/49 98 % -- -- --     10/01/24 1830 -- 74 17 115/46 95 % -- -- --     10/01/24 1815 -- 78 12 101/52 96 % -- -- 2 L/min     10/01/24 1800 -- 83 9 86/50 99 % -- -- --     10/01/24 1745 -- 91 13 105/58 93 % -- -- --     10/01/24 1730 -- 89 11 84/52 100 % -- -- --     10/01/24 1715 -- 91 20 96/63 95 % -- -- --     10/01/24 1700 -- 83 13 100/58 94 % -- -- --     10/01/24 1645 -- 91 26 86/64 92 % -- -- --     10/01/24 1630 -- 86 15 94/52 98 % -- -- --     10/01/24 1615 -- 85 11 83/55 100 % -- -- --     10/01/24 1600 -- 83 10 84/53 96 % -- -- --     10/01/24 1500 -- 76 28 120/66 94 % -- -- --     10/01/24 1454 98.3 °F (36.8 °C) 80 18 122/79 -- 160 lb 7.9 oz (72.8 kg) Nasal cannula 3 L/min     10/01/24 1400 -- 79 -- 75/53 95 % -- -- 3 L/min     10/01/24 1328 -- 79 -- 79/53 94 % -- -- 2 L/min     10/01/24 1254 -- -- -- -- 96 % -- -- 2 L/min     10/01/24 1252 97.8 °F (36.6 °C) 80 14 81/52 95 % -- -- --     10/01/24 1251 -- -- -- -- 94 % -- Nasal cannula 5 L/min     10/01/24 1248 98.1 °F (36.7 °C) 82 12 81/52 85 % -- -- --     10/01/24 0916 -- 79 -- 99/70 -- -- -- --     10/01/24 0559 98.1 °F (36.7 °C) 70 18 91/60 90 % -- None (Room air) --  GS          CIWA Scores (since admission)       None

## 2024-10-02 NOTE — PROGRESS NOTES
[facility]                       Gastroenterology Follow up Note - [facility]    Dontae Buddy Cortez . Patient Status:  Inpatient    10/15/1970 MRN [id number]   Location [facility] 4SW-A Attending Sakina Hernandez MD   Hosp Day # 2 PCP Adrian Horowitz MD     Reason for consultation: Abdominal pain  Subjective: Patient notes abdominal pain has been improving with pain medication.  Overnight he was started on Levophed which has since been weaned off.  Review of Systems:   10 point ROS completed and was negative, except for pertinent positive and negatives stated in subjective.    For PMH, PSH, FHx and SHx- please see initial consult note.     Objective: /72   Pulse 91   Temp 97.4 °F (36.3 °C) (Temporal)   Resp 24   Ht 6' 2\" (1.88 m)   Wt 172 lb 2.9 oz (78.1 kg)   SpO2 92%   BMI 22.11 kg/m²   Gen: No acute distress  Resp: no respiratory distress  Abd: Soft, non-tender, non-distended. No rebound tenderness, no guarding.   Neuro: Alert.    Labs:   Lab Results   Component Value Date    WBC 14.2 10/02/2024    HGB 8.7 10/02/2024    HCT 26.5 10/02/2024    .0 10/02/2024    CREATSERUM 1.58 10/02/2024    BUN 33 10/02/2024     10/02/2024    K 3.3 10/02/2024    K 3.3 10/02/2024     10/02/2024    CO2 24.0 10/02/2024     10/02/2024    CA 8.5 10/02/2024    ALB 2.4 10/02/2024    ALKPHO 138 10/02/2024    BILT 1.1 10/02/2024    AST 27 10/02/2024    ALT 30 10/02/2024    INR 2.60 10/02/2024    PTP 28.1 10/02/2024     Recent Labs   Lab 24  0637 24  1616 10/01/24  0625 10/02/24  0430   *  --  125* 128*   BUN 13  --  21 33*   CREATSERUM 0.56*  --  1.41* 1.58*   CA 8.1*  --  8.4* 8.5*   *  --  136 133*   K 2.6* 3.3* 3.7  3.7 3.3*  3.3*     --  104 104   CO2 27.0  --  26.0 24.0     Recent Labs   Lab 10/02/24  0431   RBC 2.88*   HGB 8.7*   HCT 26.5*   MCV 92.0   MCH 30.2   MCHC 32.8   RDW 14.6   NEPRELIM 12.48*   WBC 14.2*   .0        Recent Labs   Lab 09/30/24  0637 10/01/24  0625 10/02/24  0430   ALT 37 30 30   AST 27 21 27       Assessment:  Assessment: 53 yr-old male with extensive hx including CAD s/p CABG, dyslipidemia, and metastatic esophageal adenocarcinoma c/b CBD stricture (CBD stent 9/17/24; Dr Ritchie), PE/DVT (6/2024; Eliquis), and 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 Work-up thus far reveals Klebsiella bacteremia. US guided paracentesis to assess for SBP, and pending clinical course consider EUS/EGD with neurolysis given chronic abd pain   Plan:  Continue IV antibiotics per infectious disease  Further management of shock per ICU team  Ultrasound Doppler reviewed and was unremarkable  Okay to continue diet at this time as tolerated  No current need at this time for any endoscopic evaluation especially given bacteremia.  If symptoms persist however then can consider repeat endoscopic evaluation or possible neurolysis by interventional GI.  CBC and CMP in the morning    Thank you for allowing us to participate in patient's care. Please call us with any questions or concerns.  The GI consult service will continue to follow.     Giuseppe Sierra DO  [facility]  [phone]

## 2024-10-02 NOTE — PROGRESS NOTES
[facility]   part of [facility]  Palliative Care Progress Note    Dontae Cortez Jr. Patient Status:  Inpatient    10/15/1970 MRN [id number]   Location [facility] 4NW-A Attending Nixon Vergara MD   Hosp Day # 2 PCP Adrian Horowitz MD         Summary     Dontae Cortez Jr. is a 53 year old male with history of metastatic pancreatic cancer (dx 2022 see oncology consult for hx details) s/p esophagectomy and chemo (on current new tx regimen 1st cycle received 24), PE (Eliquis), HTN, CAD s/p CABG () who was admitted on 2024 for pain exacerbation, SOB and AMS (per wife). Work up in our hospital revealed CT A/P as noted below with generalized inflammatory processes and lungs with diffuse GGO bilat, also with hypokalemia, hyponatremia and BLE edema.      Consult ordered by:: Taylor Hitchcock for evaluation of Palliative Care needs and Uncontrolled symptoms.    Subjective     Interval events: Since last encounter, RRT called yesterday afternoon for lethargy and hypotension and patient transferred to ICU with + sepsis     When I entered the room, the patient was awake & alert and lying in bed. Spouse present at bedside.     Symptom assessment:   Pain assessment:   24 hour (3345-7137) OME total: 62mg OME (Morphine ER 30mg (now discontinued) + Dilaudid IV 1.8 mg (32mg OME)  Current pain: 7-8/10, described as sharp,located mostly on R side of abdomen, sometimes to back and L side abdomen, made worse by repositioning, alleviated by opioids  Pain goal: 5-6/10    See summary of discussion below.     Review of Systems:    Symptoms(s): Pain    Dyspnea: denies, increased pain with deep breathing  Cough: denies  Nausea: denies  Appetite: fair  Pain: as above    Allergies:  Allergies   Allergen Reactions    Oxaliplatin HIVES     Shaking        Medications:     Current Facility-Administered Medications:     ceFAZolin (Ancef) 2g in 10mL IV syringe premix, 2 g, Intravenous, Q8H     baclofen (Lioresal) tab 5 mg, 5 mg, Oral, BID    oxyCODONE immediate release tab 5 mg, 5 mg, Oral, Q8H    lactated ringers infusion, , Intravenous, Continuous    vancomycin (Vancocin) cap 125 mg, 125 mg, Oral, Daily    heparin (Porcine) 100 Units/mL lock flush 500 Units, 5 mL, Intravenous, PRN    [Held by provider] apixaban (Eliquis) tab 5 mg, 5 mg, Oral, BID    OLANZapine (ZyPREXA) tab 5 mg, 5 mg, Oral, Nightly    pantoprazole (Protonix) DR tab 40 mg, 40 mg, Oral, Before breakfast    pregabalin (Lyrica) cap 25 mg, 25 mg, Oral, BID    sertraline (Zoloft) tab 200 mg, 200 mg, Oral, Daily    acetaminophen (Tylenol Extra Strength) tab 500 mg, 500 mg, Oral, Q4H PRN    melatonin tab 3 mg, 3 mg, Oral, Nightly PRN    glycerin-hypromellose- (Artificial Tears) 0.2-0.2-1 % ophthalmic solution 1 drop, 1 drop, Both Eyes, QID PRN    sodium chloride (Saline Mist) 0.65 % nasal solution 1 spray, 1 spray, Each Nare, Q3H PRN    ondansetron (Zofran) 4 MG/2ML injection 4 mg, 4 mg, Intravenous, Q6H PRN    prochlorperazine (Compazine) 10 MG/2ML injection 5 mg, 5 mg, Intravenous, Q8H PRN    HYDROmorphone (Dilaudid) 1 MG/ML injection 0.2 mg, 0.2 mg, Intravenous, Q2H PRN **OR** HYDROmorphone (Dilaudid) 1 MG/ML injection 0.4 mg, 0.4 mg, Intravenous, Q2H PRN **OR** HYDROmorphone (Dilaudid) 1 MG/ML injection 0.8 mg, 0.8 mg, Intravenous, Q2H PRN    Objective     Vital Signs:  Blood pressure 107/69, pulse 89, temperature 98.4 °F (36.9 °C), temperature source Temporal, resp. rate 21, height 6' 2\" (1.88 m), weight 172 lb 2.9 oz (78.1 kg), SpO2 95%.  Body mass index is 22.11 kg/m².    Physical Exam:  General: Alert & Awake. In no apparent respiratory distress. Body habitus Thin   HEENT: AT/NC. No gross focal deficits. Dry MM   Lungs: Normal effort on RA  Abdomen: Soft, tender R side, non-distended  Extremities:  bilat  LE edema present  Neurologic: Alert and oriented to person, place, time, and situation able to recall most of events  yesterday morning regarding hallucinations that have now stopped  Psychiatric: Mood pleasant  Skin: pale, Warm and dry.    Prior to admission Palliative performance scale PPSv2 (%): 90  Observed during hospitalization: 70 %    % Ambulation Activity Level Self-Care Intake Consciousness   100 Full  Normal  No Disease Full Normal Full   90 Full  Normal  Some Disease Full Normal Full   80 Full  Normal w/effort  Some Disease Full Normal or reduced Full   70 Reduced  Can't Perform Job  Some Disease Full Normal or reduced Full   60 Reduced  Can't Perform Hobby   Significant Disease Occ Assist Normal or reduced Full or confused   50 Mainly sit/lie Can't do any work  Extensive Disease Partial Assist Normal or reduced Full or confused   40 Mainly in bed Can't do any work  Extensive Disease Mainly Assist Normal or reduced Full or confused   30 Bed Bound Can't do any work  Extensive Disease Max Assist  Total Care Reduced  Drowsy/confused   20 Bed Bound Can't do any work  Extensive Disease Max Assist  Total Care Minimal  Drowsy/confused   10 Bed Bound Can't do any work  Extensive Disease Max Assist  Total Care Mouth Care  Drowsy/confused   0 Death        Hematology:  Lab Results   Component Value Date    WBC 14.2 (H) 10/02/2024    HGB 8.7 (L) 10/02/2024    HCT 26.5 (L) 10/02/2024    .0 10/02/2024       Coags:  Lab Results   Component Value Date    PT 20.4 (H) 01/30/2014    INR 2.60 (H) 10/02/2024    PTT 80.5 (H) 06/19/2024       Chemistry:  Lab Results   Component Value Date    CREATSERUM 1.58 (H) 10/02/2024    BUN 33 (H) 10/02/2024     (L) 10/02/2024    K 3.3 (L) 10/02/2024    K 3.3 (L) 10/02/2024     10/02/2024    CO2 24.0 10/02/2024     (H) 10/02/2024    CA 8.5 (L) 10/02/2024    ALB 2.4 (L) 10/02/2024    ALKPHO 138 (H) 10/02/2024    BILT 1.1 10/02/2024    TP 5.0 (L) 10/02/2024    AST 27 10/02/2024    ALT 30 10/02/2024    DDIMER 0.38 12/19/2023    BNP 33 08/15/2013    MG 1.8 09/20/2024    PHOS 2.9  03/15/2024    TROP <0.046 07/18/2017       Imaging:  US ABDOMEN LIMITED (CPT=76705)    Result Date: 10/2/2024  CONCLUSION:   Grayscale sonography of the abdomen reveals trace perihepatic ascites, the fluid demonstrating loculation/septation.  No significant fluid elsewhere within the abdomen.   LOCATION:  [facility]   Dictated by (CST): Richie Jay MD on 10/02/2024 at 12:22 PM     Finalized by (CST): Richie Jay MD on 10/02/2024 at 12:24 PM       US ABDOMEN DOPPLER ONLY (CPT=93975)    Result Date: 10/1/2024  CONCLUSION:  Normal flow in hepatic and portal veins.   LOCATION:  [facility]    Dictated by (CST): Tim Erwin MD on 10/01/2024 at 6:32 PM     Finalized by (CST): Tim Erwin MD on 10/01/2024 at 6:33 PM       XR CHEST AP PORTABLE  (CPT=71045)    Result Date: 10/1/2024  CONCLUSION:  1. Subtle opacity within the right upper lobe which may represent pneumonia. 2. Mild interstitial opacities which may represent mild edema. 3. Bibasilar atelectasis/infiltrates, left greater than right. 4. Trace bilateral pleural effusions.    LOCATION:  [id number]      Dictated by (CST): Jenny Navarrete MD on 10/01/2024 at 2:52 PM     Finalized by (CST): Jenny Navarrete MD on 10/01/2024 at 2:53 PM        Summary of Discussion      Overall feeling much better today. Mental status at baseline and no hallucinations since stopping morphine and post sepsis resuscitation.   We discussed change to OxyIR and IV dilaudid panel available for now and will titrate based on symptoms and clinical condition.   Dr. Hernandez present and discussed surgery consult to evaluate for gall bladder drain which he and his wife were agreeable to. He remains focused on \"fighting\" and continuing with a treatment focused approach to care.     Advance Care Planning counseling and discussion:  HCPOA:  Document on file Yes [x] No []. Requested for file []  Healthcare Agent Appointed: Yes  Healthcare Agent's Name: Alysha Mix  Healthcare Agent's Phone Number:  [phone]  Patient's wishes noted on form:  QOL/quantity [x] prolongation [].    Code Status:  DNAR/Selective Treatment changed yesterday by spouse during RRT. Discussed with Dr. Hernandez again today with patient and wife and he confirmed no CPR or intubation but continued medical management.   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 Surgical consult pending for sofy drain.   Pain control without side effects.   Advanced Care Planning:  Code Status: DNAR/Selective Treatment changed yesterday by spouse during RRT. Discussed with Dr. Hernandez again today with patient and wife and he confirmed no CPR or intubation but continued medical management.   HCPOA:  Document on file Healthcare Agent's Name: Reta EUGENEAlysha SHRESTHA  POLST: Aware document will need to be completed prior to DC  Symptoms/Pain  Agree to continued  lower OxyIR 5mg q8hr schedule with Dilaudid IV panel prn.   Continued titration based on clinical condition.     Discussed today's visit with  Spouse, and hospitalist.    Palliative Care Follow Up: Palliative care team will Continue to follow while inpatient.  Palliative care follow up at discharge: Established with [facility].    Thank you for allowing Palliative Care services to participate in the care of Donate Cortez Jr..    A total of 40 minutes were spent on this consult, which included all of the following: chart review, direct face to face contact, history taking, physical  examination, counseling and coordinating care, and documentation      ARTEMIO Ray  Palliative Care   Phone: [phone]     10/2/2024  4:25 PM        The 21st Century Cures Act makes medical notes like these available to patients in the interest of transparency. Please be advised this is a medical document. Medical documents are intended to carry relevant information, facts as evident, and the clinical opinion of the practitioner. The medical note is intended as peer to peer communication and may appear blunt or direct. It is written in medical language and may contain abbreviations or verbiage that are unfamiliar.

## 2024-10-02 NOTE — PLAN OF CARE
Generally sleeping, alert and oriented with stimulation and maintains wakefulness, is interactive and tells stories.  Does struggle with the date and is vague and almost rambling when trying to discuss some specifics (medical care especially).  Denies dyspnea and nausea, bradypnea noted with sleep.  Rhonchi noted to right lung field, set up incentive spirometer and flutter valve and this helped Danica cough up additional thick tan sputum, remains on 2L NC.  Pulses 1+ with +3 edema to bilateral legs, skin is pale.  Low UOP today (endorsed during critical care rounds), improved as indicated by last bladder scan of 357mL at 0000 (up from 197 at 1830.  Attempted to void but unable at this time.  During CHG bath noted smear stool as well as stage 1 decubitus - photo taken for chart and mepilex placed.  SCD's and port a cath remain intact, levo to keep SBP >89 or MAP >59.  Extensive education provided re: decubitus prevention/treatment and states understanding.  Keeping coccyx area offloaded.  See assessments and flowsheets for further data.

## 2024-10-03 PROBLEM — C79.9 METASTATIC ADENOCARCINOMA (HCC): Status: ACTIVE | Noted: 2024-10-03

## 2024-10-03 PROBLEM — C79.9 METASTATIC ADENOCARCINOMA (HCC): Status: ACTIVE | Noted: 2024-01-01

## 2024-10-03 LAB
ALBUMIN SERPL-MCNC: 2.3 G/DL (ref 3.2–4.8)
ALBUMIN/GLOB SERPL: 0.9 {RATIO} (ref 1–2)
ALP LIVER SERPL-CCNC: 140 U/L
ALT SERPL-CCNC: 36 U/L
ANION GAP SERPL CALC-SCNC: 1 MMOL/L (ref 0–18)
AST SERPL-CCNC: 63 U/L (ref ?–34)
BASOPHILS # BLD AUTO: 0.01 X10(3) UL (ref 0–0.2)
BASOPHILS NFR BLD AUTO: 0.1 %
BILIRUB SERPL-MCNC: 1 MG/DL (ref 0.3–1.2)
BUN BLD-MCNC: 30 MG/DL (ref 9–23)
CALCIUM BLD-MCNC: 8.6 MG/DL (ref 8.7–10.4)
CHLORIDE SERPL-SCNC: 106 MMOL/L (ref 98–112)
CO2 SERPL-SCNC: 26 MMOL/L (ref 21–32)
CREAT BLD-MCNC: 0.74 MG/DL
EGFRCR SERPLBLD CKD-EPI 2021: 108 ML/MIN/1.73M2 (ref 60–?)
EOSINOPHIL # BLD AUTO: 0.01 X10(3) UL (ref 0–0.7)
EOSINOPHIL NFR BLD AUTO: 0.1 %
ERYTHROCYTE [DISTWIDTH] IN BLOOD BY AUTOMATED COUNT: 14.8 %
GLOBULIN PLAS-MCNC: 2.6 G/DL (ref 2–3.5)
GLUCOSE BLD-MCNC: 89 MG/DL (ref 70–99)
HCT VFR BLD AUTO: 25.6 %
HGB BLD-MCNC: 8.2 G/DL
IMM GRANULOCYTES # BLD AUTO: 0.19 X10(3) UL (ref 0–1)
IMM GRANULOCYTES NFR BLD: 2 %
INR BLD: 2.02 (ref 0.8–1.2)
LYMPHOCYTES # BLD AUTO: 0.41 X10(3) UL (ref 1–4)
LYMPHOCYTES NFR BLD AUTO: 4.4 %
MCH RBC QN AUTO: 30.1 PG (ref 26–34)
MCHC RBC AUTO-ENTMCNC: 32 G/DL (ref 31–37)
MCV RBC AUTO: 94.1 FL
MONOCYTES # BLD AUTO: 0.59 X10(3) UL (ref 0.1–1)
MONOCYTES NFR BLD AUTO: 6.4 %
NEUTROPHILS # BLD AUTO: 8.07 X10 (3) UL (ref 1.5–7.7)
NEUTROPHILS # BLD AUTO: 8.07 X10(3) UL (ref 1.5–7.7)
NEUTROPHILS NFR BLD AUTO: 87 %
OSMOLALITY SERPL CALC.SUM OF ELEC: 282 MOSM/KG (ref 275–295)
PLATELET # BLD AUTO: 156 10(3)UL (ref 150–450)
POTASSIUM SERPL-SCNC: 3.4 MMOL/L (ref 3.5–5.1)
POTASSIUM SERPL-SCNC: 3.4 MMOL/L (ref 3.5–5.1)
PROT SERPL-MCNC: 4.9 G/DL (ref 5.7–8.2)
PROTHROMBIN TIME: 23 SECONDS (ref 11.6–14.8)
RBC # BLD AUTO: 2.72 X10(6)UL
SODIUM SERPL-SCNC: 133 MMOL/L (ref 136–145)
WBC # BLD AUTO: 9.3 X10(3) UL (ref 4–11)

## 2024-10-03 PROCEDURE — 99232 SBSQ HOSP IP/OBS MODERATE 35: CPT | Performed by: INTERNAL MEDICINE

## 2024-10-03 PROCEDURE — 99233 SBSQ HOSP IP/OBS HIGH 50: CPT | Performed by: INTERNAL MEDICINE

## 2024-10-03 PROCEDURE — 99497 ADVNCD CARE PLAN 30 MIN: CPT | Performed by: NURSE PRACTITIONER

## 2024-10-03 PROCEDURE — 99232 SBSQ HOSP IP/OBS MODERATE 35: CPT | Performed by: NURSE PRACTITIONER

## 2024-10-03 PROCEDURE — 99254 IP/OBS CNSLTJ NEW/EST MOD 60: CPT | Performed by: SURGERY

## 2024-10-03 RX ORDER — IPRATROPIUM BROMIDE AND ALBUTEROL SULFATE 2.5; .5 MG/3ML; MG/3ML
3 SOLUTION RESPIRATORY (INHALATION) EVERY 6 HOURS PRN
Status: DISCONTINUED | OUTPATIENT
Start: 2024-10-03 | End: 2024-10-12

## 2024-10-03 RX ORDER — POTASSIUM CHLORIDE 1500 MG/1
40 TABLET, EXTENDED RELEASE ORAL EVERY 4 HOURS
Status: COMPLETED | OUTPATIENT
Start: 2024-10-03 | End: 2024-10-03

## 2024-10-03 NOTE — CONSULTS
[facility]  Report of Consultation    Dontae Cortez Jr. Patient Status:  Inpatient    10/15/1970 MRN [id number]   Location [facility] 4NW-A Attending Nixon Vergara MD   Hosp Day # 3 PCP Adrian Horowitz MD     Requesting Physician:  Dr. Guy Vergara    Reason for Consultation:  Acute cholecystitis    Chief Complaint:  Abdominal pain    History of Present Illness:  Dontae Cortez Jr. is a 53 year old male with a history of stage IV esophageal cancer who presents to [facility] on 2024 for evaluation of abdominal pain.  The patient had a recent admission to [facility] for evaluation of elevated LFTs.  He under went ERCP with biliary sphincterotomy and common bile duct/pancreatic duct stent placement on 2024 by Dr. Ritchie.  He had a CT a of the chest/abdomen and pelvis with findings of progressive hepatic metastatic disease.  The patient returns to [facility] for evaluation of diffuse abdominal pain that began 3 to 4 days prior to his admission.  He denied associated nausea or vomiting.  He denied fever or chills.  Upon admission, he was noted to have mildly elevated WBC of 11.9.  Alkaline phosphatase 172 AST 27 ALT 37 total bilirubin 1.3.  Lipase 13.  Lactic acid 0.8.  Ultrasound of the abdomen with trace perihepatic ascites.  HIDA scan with no definitive filling of the gallbladder which was prominent in size.  Findings could represent acute cholecystitis.        History:  Past Medical History:    Back problem    Belching    Black stools    Borderline diabetes    Dx in 2013 - HgA1C 6.2%    C. difficile diarrhea    pt treated and without symptoms    Chest pain    Coronary artery disease    On 13: CABG x 4 with LIMA to LAD and SVG to diagonal, OM and PDA    Decorative tattoo    Depression    Difficult intubation    h/o esophagectomy for CA; developed esophagobronchial vistula    Disorder of liver    LIVER CA    Esophageal cancer (HCC)    completed chemo     Esophageal reflux    Essential hypertension    Exposure to medical diagnostic radiation    last tx 8/18/2022    Frequent urination    Gastroparesis    Heartburn    High blood pressure    High cholesterol    Found when I had quadruple bypass    History of COVID-19    asymptomatic - pt was dx during a hospitalization for another diagnosis. No continued symptoms    History of stomach ulcers    Hyperlipidemia    Hyperlipidemia LDL goal < 70    Indigestion    Morbid obesity with BMI of 40.0-44.9, adult (HCC)    Muscle weakness    Nausea vomiting and diarrhea    Nontoxic multinodular goiter    Dx in 8/2013: pt was told that imaging showed thyroid cysts per PCP    Pancreatic cancer (HCC)    last dose 12/4/2023 is scheduled for another round 12/27/23    Peripheral vascular disease (HCC)    pt denies    Personal history of antineoplastic chemotherapy    for esophageal cancer/completed    Personal history of antineoplastic chemotherapy    pancreatic cancer    Personal history of antineoplastic chemotherapy    Last treatment 3/12/24    Problems with swallowing    Pulmonary nodules    Dx in 8/2013: CT chest showed small bilateral fissural-based lung nodules less than 1 cm    S/P CABG x 4    On 8/16/13: CABG x 4 with LIMA to LAD and SVG to diagonal, OM and PDA    Shortness of breath    when coughing; no oxygen    Vomiting     Past Surgical History:   Procedure Laterality Date    Appendectomy      Appendectomy      Arthroscopy of joint unlisted      right shoulder    Cabg      On 8/16/13: CABG x 4 with LIMA to LAD and SVG to diagonal, OM and PDA    Cardiac cath lab      On 8/14/2013: cardiac cath showed 3-vessel disease    Other surgical history      1.       Laparoscopic robotic-assisted esophagogastrectomy.    Port, indwelling, imp       Family History   Problem Relation Age of Onset    Cancer Mother         breast and colon     Diabetes Neg       reports that he quit smoking about 11 years ago. His smoking use included  cigarettes. He started smoking about 38 years ago. He has a 27 pack-year smoking history. He has never been exposed to tobacco smoke. He has never used smokeless tobacco. He reports that he does not drink alcohol and does not use drugs.    Allergies:  Allergies   Allergen Reactions    Oxaliplatin HIVES     Shaking        Medications:  Medications Prior to Admission   Medication Sig    Potassium Chloride ER 20 MEQ Oral Tab CR Take 1 tablet by mouth daily.    baclofen 10 MG Oral Tab Take 1 tablet (10 mg total) by mouth 3 (three) times daily.    morphINE ER 30 MG Oral Tab CR Take 1 tablet (30 mg total) by mouth every 12 (twelve) hours.    oxyCODONE 10 MG Oral Tab Take 1 tablet (10 mg total) by mouth every 4 (four) hours as needed (for cancer related pain).    pregabalin 25 MG Oral Cap Take 1 capsule (25 mg total) by mouth 2 (two) times daily.    losartan 100 MG Oral Tab Take 1 tablet (100 mg total) by mouth daily.    OLANZapine 5 MG Oral Tab Take 1 tablet (5 mg total) by mouth nightly.    prochlorperazine (COMPAZINE) 10 mg tablet Take 1 tablet (10 mg total) by mouth every 6 (six) hours as needed for Nausea.    metoprolol tartrate 25 MG Oral Tab Take 1 tablet (25 mg total) by mouth 2x Daily(Beta Blocker).    ondansetron 8 MG Oral Tablet Dispersible Take 1 tablet (8 mg total) by mouth every 8 (eight) hours as needed for Nausea.    furosemide 20 MG Oral Tab Take 1 tablet (20 mg total) by mouth daily.    apixaban 5 MG Oral Tab Take 1 tablet (5 mg total) by mouth 2 (two) times daily.         Current Facility-Administered Medications:     sodium ferric gluconate (Ferrlecit) 125 mg in sodium chloride 0.9% 100mL IVPB premix, 125 mg, Intravenous, Daily    ceFAZolin (Ancef) 2g in 10mL IV syringe premix, 2 g, Intravenous, Q8H    baclofen (Lioresal) tab 5 mg, 5 mg, Oral, BID    oxyCODONE immediate release tab 5 mg, 5 mg, Oral, Q8H    vancomycin (Vancocin) cap 125 mg, 125 mg, Oral, Daily    heparin (Porcine) 100 Units/mL lock flush  500 Units, 5 mL, Intravenous, PRN    [Held by provider] apixaban (Eliquis) tab 5 mg, 5 mg, Oral, BID    OLANZapine (ZyPREXA) tab 5 mg, 5 mg, Oral, Nightly    pantoprazole (Protonix) DR tab 40 mg, 40 mg, Oral, Before breakfast    pregabalin (Lyrica) cap 25 mg, 25 mg, Oral, BID    sertraline (Zoloft) tab 200 mg, 200 mg, Oral, Daily    acetaminophen (Tylenol Extra Strength) tab 500 mg, 500 mg, Oral, Q4H PRN    melatonin tab 3 mg, 3 mg, Oral, Nightly PRN    glycerin-hypromellose- (Artificial Tears) 0.2-0.2-1 % ophthalmic solution 1 drop, 1 drop, Both Eyes, QID PRN    sodium chloride (Saline Mist) 0.65 % nasal solution 1 spray, 1 spray, Each Nare, Q3H PRN    ondansetron (Zofran) 4 MG/2ML injection 4 mg, 4 mg, Intravenous, Q6H PRN    prochlorperazine (Compazine) 10 MG/2ML injection 5 mg, 5 mg, Intravenous, Q8H PRN    HYDROmorphone (Dilaudid) 1 MG/ML injection 0.2 mg, 0.2 mg, Intravenous, Q2H PRN **OR** HYDROmorphone (Dilaudid) 1 MG/ML injection 0.4 mg, 0.4 mg, Intravenous, Q2H PRN **OR** HYDROmorphone (Dilaudid) 1 MG/ML injection 0.8 mg, 0.8 mg, Intravenous, Q2H PRN      Physical Exam:  /66   Pulse 70   Temp 97.8 °F (36.6 °C) (Oral)   Resp 17   Ht 74\"   Wt 172 lb 2.9 oz (78.1 kg)   SpO2 94%   BMI 22.11 kg/m²   Physical Exam  Constitutional:       General: He is not in acute distress.     Appearance: Normal appearance. He is not ill-appearing.   HENT:      Head: Normocephalic and atraumatic.   Cardiovascular:      Rate and Rhythm: Normal rate and regular rhythm.      Pulses: Normal pulses.   Pulmonary:      Effort: Pulmonary effort is normal. No respiratory distress.   Abdominal:      General: There is distension.      Palpations: Abdomen is soft.      Tenderness: There is abdominal tenderness. There is no guarding or rebound.      Comments: Abdomen is soft, mild distended, positive right upper quadrant tenderness to palpation.   Musculoskeletal:         General: Normal range of motion.      Cervical  back: Normal range of motion.   Skin:     General: Skin is warm and dry.   Neurological:      General: No focal deficit present.      Mental Status: He is alert and oriented to person, place, and time.   Psychiatric:         Mood and Affect: Mood normal.         Behavior: Behavior normal.         Laboratory Data:  Recent Labs   Lab 10/01/24  0625 10/02/24  0431 10/03/24  0522   RBC 3.07* 2.88* 2.72*   HGB 9.4* 8.7* 8.2*   HCT 27.8* 26.5* 25.6*   MCV 90.6 92.0 94.1   MCH 30.6 30.2 30.1   MCHC 33.8 32.8 32.0   RDW 14.6 14.6 14.8   NEPRELIM 11.48* 12.48* 8.07*   WBC 13.2* 14.2* 9.3   .0 203.0 156.0       Recent Labs   Lab 10/01/24  0625 10/02/24  0430 10/03/24  0522   * 128* 89   BUN 21 33* 30*   CREATSERUM 1.41* 1.58* 0.74   CA 8.4* 8.5* 8.6*   ALB 2.9* 2.4* 2.3*    133* 133*   K 3.7  3.7 3.3*  3.3* 3.4*  3.4*    104 106   CO2 26.0 24.0 26.0   ALKPHO 159* 138* 140*   AST 21 27 63*   ALT 30 30 36   BILT 1.3* 1.1 1.0   TP 5.3* 5.0* 4.9*         Recent Labs   Lab 10/01/24  0811 10/02/24  0431   PTP 40.7* 28.1*   INR 4.19* 2.60*         NM GB HEPATOBILIARY SCAN WITH PHARMACY  (CPT=78227)    Result Date: 10/2/2024  CONCLUSION:  No definitive filling of the gallbladder which was prominent in size and seen at the anterior inferior margin of the right hepatic lobe on the CT from 9/30/2024.  These imaging findings could represent acute cholecystitis in the appropriate clinical setting.   LOCATION:  [place]   Dictated by (CST): Stromberg, LeRoy, MD on 10/02/2024 at 7:51 PM     Finalized by (CST): Stromberg, LeRoy, MD on 10/02/2024 at 7:56 PM          Cristina Carrion PA-C  10/3/2024  9:28 AM    Is this a shared or split note between Advanced Practice Provider and Physician? Yes      Medical Decision Making         Impression:  Patient Active Problem List   Diagnosis    Primary hypertension    Hyperlipidemia with target low density lipoprotein (LDL) cholesterol less than 70 mg/dL    Coronary artery  disease involving coronary bypass graft of native heart with angina pectoris (HCC)    S/P CABG x 4    Nontoxic multinodular goiter    Pulmonary nodules    Thrombophlebitis leg    BRANDAN (generalized anxiety disorder)    Right shoulder Arthroscopy acromioplasty ,distal  clavicle resection, rotator cuff/labral debridment  Global 05/13/2021    Right bicipital tenosynovitis    Malignant neoplasm of esophagus (HCC)    Pleural effusion    Esophageal anastomotic leak    Esophageal obstruction    On total parenteral nutrition (TPN)    Mechanical complication of esophagostomy (HCC)    Abdominal pain of unknown etiology    Migration of esophageal stent    Gastroparesis    Esophageal carcinoma (HCC)    Normocytic anemia    Esophageal fistula    Subacute cough    Acquired bronchoesophageal fistula (HCC)    Pneumonia of both lower lobes due to infectious organism    Malignant neoplasm of pancreas (HCC)    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 difficile colitis    Dysphagia, unspecified type    Dyspnea and respiratory abnormalities    Hypokalemia    Dysphagia    Narcotic drug use    History of esophageal cancer    Palliative care by specialist    Neoplasm related pain    Endobronchial mass    Hyponatremia    Thrombocytopenia (HCC)    Acute kidney injury (HCC)    Hyperglycemia    Aspiration pneumonitis (HCC)    C. difficile diarrhea    Aspiration pneumonia (HCC)    Malignant neoplasm metastatic to liver (HCC)    Hyperlipidemia    Nausea vomiting and diarrhea    Fistula, bronchoesophageal (HCC)    Iron deficiency anemia, unspecified iron deficiency anemia type    Azotemia    Palliative care encounter    Goals of care,  counseling/discussion    Cancer related pain    Anemia    Fistula    Acute cough    Pneumonia of right lung due to infectious organism, unspecified part of lung    Bacteremia    Acute postoperative pain    Exocrine pancreatic insufficiency (HCC)    Hypertriglyceridemia    Acute pulmonary embolism without acute cor pulmonale, unspecified pulmonary embolism type (HCC)    Acute deep vein thrombosis (DVT) of popliteal vein of right lower extremity (HCC)    Altered mental status, unspecified altered mental status type    Transaminitis    Biliary obstruction (HCC)    Hyperbilirubinemia    Esophageal adenocarcinoma (HCC)    Cancer associated pain    Abdominal pain, acute    Abnormal CT of the chest    Lower extremity edema    Abnormal finding of diagnostic imaging    Metastatic adenocarcinoma to esophagus (HCC)    Klebsiella pneumoniae sepsis (HCC)    Septic shock (HCC)    Acalculous cholecystitis    Severe malnutrition (HCC)       53-year-old gentleman who was admitted through [facility] ED on September 30 for complaints of abdominal pain.  The patient does have a history of stage IV esophageal carcinoma.  He is status post esophagectomy with gastric pull-up complicated by esophageal bronchial fistula.  The patient was seen by GI service for elevated LFTs.  He did undergo ERCP, sphincterotomy with placement of CBD and pancreatic duct stent on September 17, 2024.  Workup revealed leukocytosis of 14.2 yesterday, decreased to 9.3 today.  Hemoglobin decreased 8.7 yesterday, 8.2 today..  LFTs within normal limits except alkaline phosphatase 140 , total bilirubin 1.0.  CT scan on admission revealed interval development of groundglass airspace opacities bilateral upper and lower lobes.  Severe coronary artery atherosclerotic changes are noted but stable.  Mild to moderate ascites is noted.  Intrahepatic metastatic lesions are again noted.  Pancreatic mass is also again noted.  CBD stent in place.  Interval development of  colonic wall thickening extending from the transverse colon.  Development of stranding of the mesentery which may be related to ascites.  Diffuse edema of the subcutaneous tissue of the chest wall abdomen and flank is also seen.  HIDA scan was performed and this revealed no filling of the gallbladder with a prominent gallbladder consistent with acute cholecystitis  On physical examination, the patient does have focal epigastric and right upper quadrant abdominal tenderness.  There is no evidence of guarding or rebound.  Treatment options were reviewed in detail with the patient.  At this time the recommendation is to proceed with percutaneous cholecystostomy tube placement for acute acalculous cholecystitis due to multiple comorbidities and advanced esophageal carcinoma.  Jeffrey has no further questions at this time and will proceed with IR placement of cholecystostomy tube as discussed.  Interventional radiology has been notified.  DAVID Blount MD, FACS    Please note that this report has been produced using speech recognition software and may contain errors related to that system including but not limited to errors in grammar, punctuation and spelling as well as words and phrases that possibly may have been recognized inappropriately.  If there are any questions or concerns please contact the dictating provider for clarification.  The 21st Century Cures Act makes medical notes like these available to patients in the interest of trans parency. Please be advised this is a medical document. Medical documents are intended to carry relevant information, facts as evident, and the clinical opinion of the practitioner. The medical note is intended as peer to peer communication and may appear blunt or direct. It is written in medical language and may contain abbreviations or verbiage that are unfamiliar.  If there are any questions or concerns please contact the dictating provider for  clarification.

## 2024-10-03 NOTE — PROGRESS NOTES
[facility] Progress Note   Length of Stay: 3    Subjective:   -Transferred back to medical floor  -[facility] with possible acute cholecytitis-surgery consult pending  -US abdomen with trace ascites with septation/loculation    Oncology History   -2018: Per report had a normal EGD and colonoscopy     -June 2022: He met with GI due to new onset dysphagia which started about 1 month prior to his visit.  He had an esophagram with a focal abrupt narrowing with irregular margins in the distal one third of the esophagus extending to the GE junction.  He also had an episode of food impaction. He has also lost about 15 lb. He was a heavy smoker from age 14 to about 40 (1.5 PPD). Also with alcohol use currently. Mother with a history of breast and colon cancer.  EGD and EUS with Dr. Yadav on 6/7/2022: Severe esophagitis with a concerning mass extending 37-39 centimeters from the incisors.  Nonobstructive mass.  By EUS uT3N1 disease.  Pathology showed invasive moderate to poorly differentiated adenocarcinoma.  HER2 amplified by FISH. CT CAP done at [facility] on 6/16/22: Results not loaded to PACs yet.  CA 19-9 from the same day was 107.4.  CEA normal. PET/CT on 6/20/2022: Increased distal esophageal uptake with an SUV of 14.4.  Concern for shreya metastases.     -Concurrent chemoradiation with carboplatin AUC2 plus paclitaxel 50 mg/m2: July 2022-August 2022 with  down (280-->30). Post treatment PET/CT showed improvement.      -Surgery with Dr. Lu on 9/30/22: ypT1b pN0. Recovery has been complicated by an esophageal leak,      -I met with him on 12/12/22. We discussed adjuvant opdivo for 1 year. His  was elevated to 48 and repeat was 64. CT CAP was recommended.      -He was admitted on 12/25/22 for abdominal pain. He had a POEM procedure done. He was also noted to have a RLL consolidation. He was seen by pulmonary, based on imaging an outpatient PET/CT was recommended and a biopsy of the  most FDG avid lesion would be considered. Noted to have CAD and an outpatient evaluation was recommended. Outpatient PET/CT done on 1/4/23 was reviewed in tumor board and there was no focal area of concern and adjuvant immunotherapy recommended.      -Adjuvant opdivo:01/2023-03/2023. PET/CT in in 04/2023 showed uptake in the pancreatic head and tail. Also with some uptake in liver. EGD/EUS on 4/27/23 with Dr. Ritchie: Pancreatic head mass positive for adenocarcinoma: Comparison to previous esophageal cancer shows similarity but IHC in non-specific. Her 2 IHC was 2+ on this specimen but FISH was negative.  Given discordant results on initial HER2 and follow-up HER2 we decided to proceed with chemotherapy with immunotherapy with HER2 directed therapy.     -Chemo + Herceptin + Immunotherapy: He received 7 cycles of FOLFOX + Herceptin + Immunotherapy (05/2023-09/2023). Imaging after C5 showed a favorable response. After C7, we held oxaliplatin due to neuropathy and he was started on maintenance herceptin + IO maintenance. Signatera was negative 09/11/23.     -Herceptin + IO Maintenance: He received 3 cycles from 09/25/23-10/31/23. Treatment was delayed due to hospitalizations.      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 Due to new findings-restarting FOLFOX discussed.      -admitted from 1/8/24-1/13/24 for persistent dysphagia and bronchesophageal stent.      -1/17/24: EGD with fistula closure with ASD occluder and removal of the bronchial stent.     -FOLFOX + Herceptin + Opdivo restarted: 3 cycles: 02/2024-03/2024     -Treatment delayed again for recurrent hospital admissions     -He  underwent a Latissimus dorsi flap reconstruction for his bronchoesophageal fistula on 5/1/24     -PET/CT 5/15/24: at least 3 areas of uptake in the right hepatic lobe, uptake in pancrease and retroperitoneum. Post-operative uptake in chest wall.      -FOLFOX + Herceptin + Nivolumab Restarted: 6/11/2024 until 8/14/2024.  He received 5 cycles.  A PET scan after 5 cycles showed overall progression of metastatic disease with new gastrohepatic and peripancreatic lymph nodes along with enlargement of pancreatic and hepatic metastases.  There were also new lung nodules.  A CT-guided liver biopsy on 9/10/2024 was done which was positive for metastatic adenocarcinoma and the immunoprofile is compatible with the patient's esophageal primary. HER2 negative on FISH.     -He was admitted on 9/15/24 for abdominal pain. Labs showed elevated AST/ALT and T bili (6.7). CTA CAP showed progression of pancreatic and hepatic masses. There is intra-extra hepatic biliary ductal dilation. Subpleural reticular opacities noted-inflammatory or interstitial process? Hi Res CT recommended. He underwent an ERCP which showed a CBD stricture with biliary sphincterectomy and CBD, PD bile duct stent placement with Dr. Ritchie.     -Weekly paclitaxel 80 mg/m2: 3 weeks on 1 week off   -C1D1: 9/23/24:  42,110    ROS: 12 Point ROS completed and pertinent positives are above     Objective:    ceFAZolin  2 g Intravenous Q8H    baclofen  5 mg Oral BID    oxyCODONE  5 mg Oral Q8H    vancomycin  125 mg Oral Daily    [Held by provider] apixaban  5 mg Oral BID    OLANZapine  5 mg Oral Nightly    pantoprazole  40 mg Oral Before breakfast    pregabalin  25 mg Oral BID    sertraline  200 mg Oral Daily       heparin    acetaminophen    melatonin    glycerin-hypromellose-    sodium chloride    ondansetron    prochlorperazine    HYDROmorphone **OR** HYDROmorphone **OR** HYDROmorphone    Physical Exam  Vitals:    10/03/24 0405   BP: 109/64   Pulse: 63    Resp: 17   Temp: 97.8 °F (36.6 °C)     General: NAD, AOX3  HEENT: clear op, mmm, no jvd, no scleral icterus   CV: RRR S1S2 no murmurs, no edema  Lungs: CTAB, no increased work of breathing  Abd: soft nt nd +BS no hepatosplenomegaly  Neuro: CN: II-XII grossly intact    Labs/Imaging/Path:  Lab Results   Component Value Date    WBC 9.3 10/03/2024    HGB 8.2 (L) 10/03/2024    HCT 25.6 (L) 10/03/2024    MCV 94.1 10/03/2024    .0 10/03/2024       Recent Labs   Lab 10/01/24  0625 10/02/24  0430 10/03/24  0522   * 128* 89   BUN 21 33* 30*   CREATSERUM 1.41* 1.58* 0.74   CA 8.4* 8.5* 8.6*   ALB 2.9* 2.4* 2.3*    133* 133*   K 3.7  3.7 3.3*  3.3* 3.4*  3.4*    104 106   CO2 26.0 24.0 26.0   ALKPHO 159* 138* 140*   AST 21 27 63*   ALT 30 30 36   BILT 1.3* 1.1 1.0   TP 5.3* 5.0* 4.9*       Imaging: Reviewed   CTA CAP on 9/30/24  There has been interval CBD stent placement.      Additionally, in the interim from the previous exam of 9/15/2024, there has been development of diffuse patchy ground-glass opacities within the lungs bilaterally, mild to moderate diffuse intra-abdominal ascites with mesenteric and omental stranding   diffusely as well as the development of subcutaneous edematous changes/stranding involving the entire visualized subcutaneous tissues of the chest, abdomen, pelvis and upper thighs.  Additionally, there has been interval increased gallbladder distension   with mild gallbladder wall thickening and diffuse pericholecystic fluid and fat stranding.  There is also interval development of colonic wall thickening involving the transverse to rectosigmoid colon.      Given the multiple interval changes, the appearance would suggest an acute, diffuse inflammatory process.      The previously identified metastatic lesions of the liver and pancreas appear relatively stable.      No pulmonary emboli noted.     Assessment and Plan:   Klebsiella pneumoniae Bacteremia: ID following. Possibly  from recent CBD stent. On ceftriaxone. Ok to remove port if needed as he is on weekly taxol for his chemotherapy    Trembling/Hallucinations: Appears to have resolved.     Ascites: repeat US with trace ascites so para canceled.     Metastatic esophageal cancer  -Most recent imaging is consistent with metastatic disease progression.  Repeat HER2 is negative on FISH. We discussed that he will not be a good candidate for Cyramza due to history of esophageal fistula and PE/DVT. He is on weekly paclitaxel and received cycle 1 day 1 on 9/23/24. Treatment on hold.      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 CTA from 09/2024 shows resolution Ok to hold for procedures. Note that INR elevation is likely from Eliquis.      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 He is s/p ERCP on 9/17/24 with CBD, PD stent placement.      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 Now s/p repair 5/1/24.     Appreciate palliative care consultation     [facility]

## 2024-10-03 NOTE — PROGRESS NOTES
[facility]  Pulmonary/Critical Care Consult note    Dontae Buddy Ortegacristina Champion Patient Status:  Inpatient    10/15/1970 MRN [id number]   Location [facility] 4NW-A Attending Nixon Vergara MD   Hosp Day # 3 PCP Adrian Horowitz MD       History of Present Illness:    Able to mobilize secretions    History:  Past Medical History:    Back problem    Belching    Black stools    Borderline diabetes    Dx in 2013 - HgA1C 6.2%    C. difficile diarrhea    pt treated and without symptoms    Chest pain    Coronary artery disease    On 13: CABG x 4 with LIMA to LAD and SVG to diagonal, OM and PDA    Decorative tattoo    Depression    Difficult intubation    h/o esophagectomy for CA; developed esophagobronchial vistula    Disorder of liver    LIVER CA    Esophageal cancer (HCC)    completed chemo    Esophageal reflux    Essential hypertension    Exposure to medical diagnostic radiation    last tx 2022    Frequent urination    Gastroparesis    Heartburn    High blood pressure    High cholesterol    Found when I had quadruple bypass    History of COVID-19    asymptomatic - pt was dx during a hospitalization for another diagnosis. No continued symptoms    History of stomach ulcers    Hyperlipidemia    Hyperlipidemia LDL goal < 70    Indigestion    Morbid obesity with BMI of 40.0-44.9, adult (HCC)    Muscle weakness    Nausea vomiting and diarrhea    Nontoxic multinodular goiter    Dx in 2013: pt was told that imaging showed thyroid cysts per PCP    Pancreatic cancer (HCC)    last dose 2023 is scheduled for another round 23    Peripheral vascular disease (HCC)    pt denies    Personal history of antineoplastic chemotherapy    for esophageal cancer/completed    Personal history of antineoplastic chemotherapy    pancreatic cancer    Personal history of antineoplastic chemotherapy    Last treatment 3/12/24    Problems with swallowing    Pulmonary nodules    Dx in 2013: CT chest showed small bilateral  fissural-based lung nodules less than 1 cm    S/P CABG x 4    On 8/16/13: CABG x 4 with LIMA to LAD and SVG to diagonal, OM and PDA    Shortness of breath    when coughing; no oxygen    Vomiting     Past Surgical History:   Procedure Laterality Date    Appendectomy      Appendectomy      Arthroscopy of joint unlisted      right shoulder    Cabg      On 8/16/13: CABG x 4 with LIMA to LAD and SVG to diagonal, OM and PDA    Cardiac cath lab      On 8/14/2013: cardiac cath showed 3-vessel disease    Other surgical history      1.       Laparoscopic robotic-assisted esophagogastrectomy.    Port, indwelling, imp       Family History   Problem Relation Age of Onset    Cancer Mother         breast and colon     Diabetes Neg       reports that he quit smoking about 11 years ago. His smoking use included cigarettes. He started smoking about 38 years ago. He has a 27 pack-year smoking history. He has never been exposed to tobacco smoke. He has never used smokeless tobacco. He reports that he does not drink alcohol and does not use drugs.    Allergies:  Allergies   Allergen Reactions    Oxaliplatin HIVES     Shaking        Medications:    Current Facility-Administered Medications:     sodium ferric gluconate (Ferrlecit) 125 mg in sodium chloride 0.9% 100mL IVPB premix, 125 mg, Intravenous, Daily    ceFAZolin (Ancef) 2g in 10mL IV syringe premix, 2 g, Intravenous, Q8H    baclofen (Lioresal) tab 5 mg, 5 mg, Oral, BID    oxyCODONE immediate release tab 5 mg, 5 mg, Oral, Q8H    vancomycin (Vancocin) cap 125 mg, 125 mg, Oral, Daily    heparin (Porcine) 100 Units/mL lock flush 500 Units, 5 mL, Intravenous, PRN    [Held by provider] apixaban (Eliquis) tab 5 mg, 5 mg, Oral, BID    OLANZapine (ZyPREXA) tab 5 mg, 5 mg, Oral, Nightly    pantoprazole (Protonix) DR tab 40 mg, 40 mg, Oral, Before breakfast    pregabalin (Lyrica) cap 25 mg, 25 mg, Oral, BID    sertraline (Zoloft) tab 200 mg, 200 mg, Oral, Daily    acetaminophen (Tylenol Extra  Strength) tab 500 mg, 500 mg, Oral, Q4H PRN    melatonin tab 3 mg, 3 mg, Oral, Nightly PRN    glycerin-hypromellose- (Artificial Tears) 0.2-0.2-1 % ophthalmic solution 1 drop, 1 drop, Both Eyes, QID PRN    sodium chloride (Saline Mist) 0.65 % nasal solution 1 spray, 1 spray, Each Nare, Q3H PRN    ondansetron (Zofran) 4 MG/2ML injection 4 mg, 4 mg, Intravenous, Q6H PRN    prochlorperazine (Compazine) 10 MG/2ML injection 5 mg, 5 mg, Intravenous, Q8H PRN    HYDROmorphone (Dilaudid) 1 MG/ML injection 0.2 mg, 0.2 mg, Intravenous, Q2H PRN **OR** HYDROmorphone (Dilaudid) 1 MG/ML injection 0.4 mg, 0.4 mg, Intravenous, Q2H PRN **OR** HYDROmorphone (Dilaudid) 1 MG/ML injection 0.8 mg, 0.8 mg, Intravenous, Q2H PRN    Intake/Output:    Intake/Output Summary (Last 24 hours) at 10/3/2024 1022  Last data filed at 10/2/2024 1745  Gross per 24 hour   Intake 428.6 ml   Output 450 ml   Net -21.4 ml      Body mass index is 22.11 kg/m².    Review of Systems  Review of Systems:   A comprehensive 10 point review of systems was completed.  Pertinent positives and negatives noted in the the HPI.         Patient Vitals for the past 24 hrs:   BP Temp Temp src Pulse Resp SpO2 Height Weight   09/30/24 1132 -- -- -- -- -- -- -- 156 lb 1.4 oz (70.8 kg)   09/30/24 1100 148/88 97.6 °F (36.4 °C) Oral 80 18 96 % -- 156 lb (70.8 kg)   09/30/24 0959 (!) 139/92 97.7 °F (36.5 °C) Oral 74 18 98 % -- --   09/30/24 0921 -- -- -- -- -- 99 % -- --   09/30/24 0920 -- -- -- -- 18 (!) 89 % -- --   09/30/24 0914 135/84 -- -- 75 18 93 % -- --   09/30/24 0819 137/89 -- -- 75 18 97 % -- --   09/30/24 0713 136/77 -- -- 69 18 95 % -- --   09/30/24 0559 142/77 98.8 °F (37.1 °C) Oral 66 22 94 % 6' 2\" (1.88 m) 162 lb 0.6 oz (73.5 kg)     Vitals:    10/02/24 2000 10/02/24 2315 10/03/24 0405 10/03/24 0721   BP: 117/67 127/79 109/64 120/66   BP Location: Left arm Left arm Left arm    Pulse: 70 77 63 70   Resp: 17 18 17    Temp: 98.6 °F (37 °C) 98.6 °F (37 °C) 97.8 °F  (36.6 °C) 97.8 °F (36.6 °C)   TempSrc: Temporal Oral Oral Oral   SpO2: 97% 97% 90% 94%   Weight:       Height:             Physical Exam  Constitutional:       General: He is not in acute distress.     Appearance: Normal appearance. He is not ill-appearing or diaphoretic.   HENT:      Head: Normocephalic and atraumatic.      Nose: Nose normal. No congestion or rhinorrhea.      Mouth/Throat:      Mouth: Mucous membranes are moist.      Pharynx: Oropharynx is clear. No oropharyngeal exudate or posterior oropharyngeal erythema.   Eyes:      Extraocular Movements: Extraocular movements intact.      Pupils: Pupils are equal, round, and reactive to light.   Cardiovascular:      Rate and Rhythm: Normal rate.      Pulses: Normal pulses.      Heart sounds: Normal heart sounds. No murmur heard.  Pulmonary:      Effort: Pulmonary effort is normal. No respiratory distress.      Breath sounds: Normal breath sounds. No wheezing or rhonchi.      Comments: Diminished breath sounds in bilateral lower lobes.  Crackles in left lower lung base  Chest:      Chest wall: No tenderness.   Abdominal:      General: Abdomen is flat. Bowel sounds are normal.      Palpations: Abdomen is soft.   Musculoskeletal:         General: Normal range of motion.   Skin:     General: Skin is warm.   Neurological:      General: No focal deficit present.      Mental Status: He is alert and oriented to person, place, and time.   Psychiatric:         Mood and Affect: Mood normal.         Behavior: Behavior normal.         Thought Content: Thought content normal.         Judgment: Judgment normal.            Lab Data Review:  Recent Labs   Lab 10/01/24  0625 10/02/24  0431 10/03/24  0522   WBC 13.2* 14.2* 9.3   HGB 9.4* 8.7* 8.2*   HCT 27.8* 26.5* 25.6*   .0 203.0 156.0       Recent Labs   Lab 10/01/24  0625 10/02/24  0430 10/03/24  0522    133* 133*   K 3.7  3.7 3.3*  3.3* 3.4*  3.4*    104 106   CO2 26.0 24.0 26.0   BUN 21 33* 30*    CREATSERUM 1.41* 1.58* 0.74   CA 8.4* 8.5* 8.6*   ALB 2.9* 2.4* 2.3*   ALKPHO 159* 138* 140*   ALT 30 30 36   AST 21 27 63*   * 128* 89     Component  Ref Range & Units 10/1/24 0625   Procalcitonin  <0.05 ng/mL 3.06 High      No results for input(s): \"MG\" in the last 168 hours.    Lab Results   Component Value Date    PHOS 2.9 03/15/2024        Recent Labs   Lab 10/01/24  0811 10/02/24  0431   INR 4.19* 2.60*       Recent Labs   Lab 10/01/24  1412   ABGPHT 7.40   QDBYBI5D 43   CUVPL9D 79*   ABGHCO3 26.1   ABGBE 1.6   TEMP 98.6   TACOS Positive   SITE Left Radial   DEV Nasal cannula   THGB 8.6*       No results for input(s): \"TROP\", \"CKMB\" in the last 168 hours.    Cultures:   Hospital Encounter on 09/30/24   1. Blood Culture     Status: None (Preliminary result)    Collection Time: 10/01/24 12:19 PM    Specimen: Blood,peripheral   Result Value Ref Range    Blood Culture Result No Growth 1 Day N/A           Radiology personally reviewed:  NM GB HEPATOBILIARY SCAN WITH PHARMACY  (CPT=78227)    Result Date: 10/2/2024  CONCLUSION:  No definitive filling of the gallbladder which was prominent in size and seen at the anterior inferior margin of the right hepatic lobe on the CT from 9/30/2024.  These imaging findings could represent acute cholecystitis in the appropriate clinical setting.   LOCATION:  [facility]   Dictated by (CST): Stromberg, LeRoy, MD on 10/02/2024 at 7:51 PM     Finalized by (CST): Stromberg, LeRoy, MD on 10/02/2024 at 7:56 PM       US ABDOMEN LIMITED (CPT=76705)    Result Date: 10/2/2024  CONCLUSION:   Grayscale sonography of the abdomen reveals trace perihepatic ascites, the fluid demonstrating loculation/septation.  No significant fluid elsewhere within the abdomen.   LOCATION:  [facility]   Dictated by (CST): Richie Jay MD on 10/02/2024 at 12:22 PM     Finalized by (CST): Richie Jay MD on 10/02/2024 at 12:24 PM       US ABDOMEN DOPPLER ONLY (CPT=93975)    Result Date: 10/1/2024  CONCLUSION:   Normal flow in hepatic and portal veins.   LOCATION:  [facility]    Dictated by (CST): Tim Erwin MD on 10/01/2024 at 6:32 PM     Finalized by (CST): Tim Erwin MD on 10/01/2024 at 6:33 PM       XR CHEST AP PORTABLE  (CPT=71045)    Result Date: 10/1/2024  CONCLUSION:  1. Subtle opacity within the right upper lobe which may represent pneumonia. 2. Mild interstitial opacities which may represent mild edema. 3. Bibasilar atelectasis/infiltrates, left greater than right. 4. Trace bilateral pleural effusions.    LOCATION:  [id number]      Dictated by (CST): Jenny Navarrete MD on 10/01/2024 at 2:52 PM     Finalized by (CST): Jenny Navarrete MD on 10/01/2024 at 2:53 PM         Patient Active Problem List   Diagnosis    Primary hypertension    Hyperlipidemia with target low density lipoprotein (LDL) cholesterol less than 70 mg/dL    Coronary artery disease involving coronary bypass graft of native heart with angina pectoris (HCC)    S/P CABG x 4    Nontoxic multinodular goiter    Pulmonary nodules    Thrombophlebitis leg    BRNADAN (generalized anxiety disorder)    Right shoulder Arthroscopy acromioplasty ,distal  clavicle resection, rotator cuff/labral debridment  Global 05/13/2021    Right bicipital tenosynovitis    Malignant neoplasm of esophagus (HCC)    Pleural effusion    Esophageal anastomotic leak    Esophageal obstruction    On total parenteral nutrition (TPN)    Mechanical complication of esophagostomy (HCC)    Abdominal pain of unknown etiology    Migration of esophageal stent    Gastroparesis    Esophageal carcinoma (HCC)    Normocytic anemia    Esophageal fistula    Subacute cough    Acquired bronchoesophageal fistula (HCC)    Pneumonia of both lower lobes due to infectious organism    Malignant neoplasm of pancreas (HCC)    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 difficile colitis    Dysphagia, unspecified type    Dyspnea and respiratory abnormalities    Hypokalemia    Dysphagia    Narcotic drug use    History of esophageal cancer    Palliative care by specialist    Neoplasm related pain    Endobronchial mass    Hyponatremia    Thrombocytopenia (HCC)    Acute kidney injury (HCC)    Hyperglycemia    Aspiration pneumonitis (HCC)    C. difficile diarrhea    Aspiration pneumonia (HCC)    Malignant neoplasm metastatic to liver (HCC)    Hyperlipidemia    Nausea vomiting and diarrhea    Fistula, bronchoesophageal (HCC)    Iron deficiency anemia, unspecified iron deficiency anemia type    Azotemia    Palliative care encounter    Goals of care, counseling/discussion    Cancer related pain    Anemia    Fistula    Acute cough    Pneumonia of right lung due to infectious organism, unspecified part of lung    Bacteremia    Acute postoperative pain    Exocrine pancreatic insufficiency (HCC)    Hypertriglyceridemia    Acute pulmonary embolism without acute cor pulmonale, unspecified pulmonary embolism type (HCC)    Acute deep vein thrombosis (DVT) of popliteal vein of right lower extremity (HCC)    Altered mental status, unspecified altered mental status type    Transaminitis    Biliary obstruction (HCC)    Hyperbilirubinemia    Esophageal adenocarcinoma (HCC)    Cancer associated pain    Abdominal pain, acute    Abnormal CT of the chest    Lower extremity edema    Abnormal finding of diagnostic imaging    Metastatic adenocarcinoma to esophagus (HCC)    Klebsiella pneumoniae sepsis (HCC)    Septic shock (HCC)    Acalculous cholecystitis    Severe malnutrition (HCC)       Assessment:  Acute hypoxic respiratory failure: Currently on room air  Pulmonary nodules  Klebsiella pneumoniae bacteremia  Groundglass opacities involving by  lateral upper lobes and left lower lobe peripheral: This could represent atypical bacterial infection versus viral infection versus related to chemotherapy etc.: Elevated ESR and CRP elevated WBC count with left shift.  Procalcitonin is elevated suggesting active infection  Nonocclusive pulmonary embolism and left upper lobe and left lower lobe branches of pulmonary artery  Mediastinal adenopathy with conglomeration of AP window lymph nodes 2.5 x 2.4 cm  Pancreatic cancer status postchemotherapy  Depression, liver cancer,  GERD,   Dilated esophagus with pull-through procedure previously after esophageal cancer resection  Hypertension,  Hyperlipidemia,  Morbid obesity  History of pulmonary nodules.      Plan:  Wean FiO2 off to keep oxygen saturation between 90% to 92%  Use flutter device every 4 hours as possible to help cough out secretions  Incentive spirometry every 4 hours as possible  Continue current antibiotics  Infectious disease follow-up   Eliquis on hold  DVT prophylaxis: SCD boots   GI prophylaxis: ---  Will follow for further recommendations  Follow labs    Thank You for allowing me to participate in this patient's care     Dusty Brooks MD    Note to the patient: The 21st Century Cures Act makes medical notes like these available to patients in the interest of transparency. However, be advised that this is a medical document. It is intended as peer to peer communication. It is written in medical language and may contain abbreviations or verbiage that are unfamiliar. It may appear blunt or direct. Medical documents are intended to carry relevant information, facts as evident, and clinical opinion of the practitioner.      Disclaimer: Components of this note were documented using voice recognition system and are subject to errors not corrected at proofreading. Contact the author of this note for any clarifications

## 2024-10-03 NOTE — PLAN OF CARE
Assumed care of patient following shift report. Patient is alert and oriented. On 1LNC. IVF running through port. HIDA scan completed just before shift change. NSR on tele. BP always above parameters. Patient has complaints of pain. Restarted oxycodone, still requiring iv pain meds at this time. Patient resting between cares, call light in reach. See MAR & flowsheets for additional information.     Report given to med/onc RN, transported via wheelchair with all belongings.

## 2024-10-03 NOTE — PROGRESS NOTES
[place]   part of [facility]  Palliative Care Progress Note    Dontae Cortez Jr. Patient Status:  Inpatient    10/15/1970 MRN [id number]   [facility] 4NW-A Attending Nixon Vergara MD   Hosp Day # 3 PCP Adrian Horowitz MD     History/Other:      Dontae Cortez Jr. is a 53 year old male with history of metastatic pancreatic cancer (dx 2022 see oncology consult for hx details) s/p esophagectomy and chemo (on current new tx regimen 1st cycle received 24), PE (Eliquis), HTN, CAD s/p CABG () who was admitted on 2024 for pain exacerbation, SOB and AMS (per wife). Work up in our hospital revealed CT A/P as noted below with generalized inflammatory processes and lungs with diffuse GGO bilat, also with hypokalemia, hyponatremia and BLE edema.  - RRT 10/1/24-> tx to ICU for lethargy, hypotension, sepsis- improved status & tx to floor 10/2/24.  Awaiting surgical eval.     Consult ordered by:: Taylor Hitchcock for evaluation of Palliative Care needs and Uncontrolled symptoms.    Allergies:  Allergies   Allergen Reactions    Oxaliplatin HIVES     Shaking      Medications:     Current Facility-Administered Medications:     sodium ferric gluconate (Ferrlecit) 125 mg in sodium chloride 0.9% 100mL IVPB premix, 125 mg, Intravenous, Daily    ceFAZolin (Ancef) 2g in 10mL IV syringe premix, 2 g, Intravenous, Q8H    baclofen (Lioresal) tab 5 mg, 5 mg, Oral, BID    oxyCODONE immediate release tab 5 mg, 5 mg, Oral, Q8H    vancomycin (Vancocin) cap 125 mg, 125 mg, Oral, Daily    heparin (Porcine) 100 Units/mL lock flush 500 Units, 5 mL, Intravenous, PRN    [Held by provider] apixaban (Eliquis) tab 5 mg, 5 mg, Oral, BID    OLANZapine (ZyPREXA) tab 5 mg, 5 mg, Oral, Nightly    pantoprazole (Protonix) DR tab 40 mg, 40 mg, Oral, Before breakfast    pregabalin (Lyrica) cap 25 mg, 25 mg, Oral, BID    sertraline (Zoloft) tab 200 mg, 200 mg, Oral, Daily    acetaminophen (Tylenol Extra  Strength) tab 500 mg, 500 mg, Oral, Q4H PRN    melatonin tab 3 mg, 3 mg, Oral, Nightly PRN    glycerin-hypromellose- (Artificial Tears) 0.2-0.2-1 % ophthalmic solution 1 drop, 1 drop, Both Eyes, QID PRN    sodium chloride (Saline Mist) 0.65 % nasal solution 1 spray, 1 spray, Each Nare, Q3H PRN    ondansetron (Zofran) 4 MG/2ML injection 4 mg, 4 mg, Intravenous, Q6H PRN    prochlorperazine (Compazine) 10 MG/2ML injection 5 mg, 5 mg, Intravenous, Q8H PRN    HYDROmorphone (Dilaudid) 1 MG/ML injection 0.2 mg, 0.2 mg, Intravenous, Q2H PRN **OR** HYDROmorphone (Dilaudid) 1 MG/ML injection 0.4 mg, 0.4 mg, Intravenous, Q2H PRN **OR** HYDROmorphone (Dilaudid) 1 MG/ML injection 0.8 mg, 0.8 mg, Intravenous, Q2H PRN    Subjective      Interval events: Since last encounter, tx back to med onc floor, he does not want tele, awaiting surgical eval.     When I entered the room, the patient was awake, alert, and lying in bed. No family present at bedside.     Symptom assessment:  Pain assessment:   24 hour (3630-7428) OME total: 63mg. (Hydromorphone IV 2.4mg + oxycodone 10mg).  Current pain: 5/10, described as sharp, located abdomen & back, made worse by movement, alleviated by opioids.  Pain goal: 3-4/10    See summary of discussion below.     Review of Systems:  Dyspnea:denies  Coughdenies  Nausea:denies  Appetite:denies    Pertinent items are noted in subjective.  Bowel Movement    No data found in the last 1 encounters.         Objective:     Vital Signs:  Blood pressure 120/66, pulse 70, temperature 97.8 °F (36.6 °C), temperature source Oral, resp. rate 17, height 6' 2\" (1.88 m), weight 172 lb 2.9 oz (78.1 kg), SpO2 94%.  Body mass index is 22.11 kg/m².  Present Level of pain: 5/10  Non-verbal signs of pain present: No  Current Palliative performance scale PPSv2 (%): 70    Physical Exam:  General: Alert & Awake. In no apparent respiratory distress. Body habitus BMI WNL   HEENT: AT/NC. No gross focal deficits. MMM    Cardiac: RRR  Lungs: Normal effort on RA  Abdomen: Soft, non-tender, non-distended, normal bowel sounds X 4 quadrants   Extremities: Trace LE edema present  Neurologic: Alert and oriented to person, place, time, and situation   Psychiatric: Mood pleasant mood   Skin: Warm and dry.        Results:     Hematology:  Lab Results   Component Value Date    WBC 9.3 10/03/2024    HGB 8.2 (L) 10/03/2024    HCT 25.6 (L) 10/03/2024    .0 10/03/2024     Coags:  Lab Results   Component Value Date    PT 20.4 (H) 01/30/2014    INR 2.02 (H) 10/03/2024    PTT 80.5 (H) 06/19/2024     Chemistry:  Lab Results   Component Value Date    CREATSERUM 0.74 10/03/2024    BUN 30 (H) 10/03/2024     (L) 10/03/2024    K 3.4 (L) 10/03/2024    K 3.4 (L) 10/03/2024     10/03/2024    CO2 26.0 10/03/2024    GLU 89 10/03/2024    CA 8.6 (L) 10/03/2024    ALB 2.3 (L) 10/03/2024    ALKPHO 140 (H) 10/03/2024    BILT 1.0 10/03/2024    TP 4.9 (L) 10/03/2024    AST 63 (H) 10/03/2024    ALT 36 10/03/2024    DDIMER 0.38 12/19/2023    BNP 33 08/15/2013    MG 1.8 09/20/2024    PHOS 2.9 03/15/2024    TROP <0.046 07/18/2017     Imaging:  NM GB HEPATOBILIARY SCAN WITH PHARMACY  (CPT=78227)    Result Date: 10/2/2024  CONCLUSION:  No definitive filling of the gallbladder which was prominent in size and seen at the anterior inferior margin of the right hepatic lobe on the CT from 9/30/2024.  These imaging findings could represent acute cholecystitis in the appropriate clinical setting.   LOCATION:  [facility]   Dictated by (CST): Stromberg, LeRoy, MD on 10/02/2024 at 7:51 PM     Finalized by (CST): Stromberg, LeRoy, MD on 10/02/2024 at 7:56 PM       US ABDOMEN LIMITED (CPT=76705)    Result Date: 10/2/2024  CONCLUSION:   Grayscale sonography of the abdomen reveals trace perihepatic ascites, the fluid demonstrating loculation/septation.  No significant fluid elsewhere within the abdomen.   LOCATION:  [facility]   Dictated by (CST): Richie Jay MD on  10/02/2024 at 12:22 PM     Finalized by (CST): Richie Jay MD on 10/02/2024 at 12:24 PM       US ABDOMEN DOPPLER ONLY (CPT=93975)    Result Date: 10/1/2024  CONCLUSION:  Normal flow in hepatic and portal veins.   LOCATION:  [facility]    Dictated by (CST): Tim Erwin MD on 10/01/2024 at 6:32 PM     Finalized by (CST): Tim Erwin MD on 10/01/2024 at 6:33 PM       XR CHEST AP PORTABLE  (CPT=71045)    Result Date: 10/1/2024  CONCLUSION:  1. Subtle opacity within the right upper lobe which may represent pneumonia. 2. Mild interstitial opacities which may represent mild edema. 3. Bibasilar atelectasis/infiltrates, left greater than right. 4. Trace bilateral pleural effusions.    LOCATION:  [id number]      Dictated by (CST): Jenny Navarrete MD on 10/01/2024 at 2:52 PM     Finalized by (CST): Jenny Navarrete MD on 10/01/2024 at 2:53 PM           Summary of Discussion   I met with Ronen today, he was alone. He was resting in bed & has been waiting for surgical evaluation. He is hoping to go home soon.  He reports his pain is well controlled, and he is no longer hallucinating.   We discussed DNAR status, and he verbally consented to completing the POLST form today.     Advance Care Planning counseling and discussion:   HC POA Documentation Completed--Document in [employer]. Healthcare Agent's Name: Alysha Mix   We voluntarily discussed the risks vs benefits of life sustaining treatments in the setting of comorbid medical conditions with patient.  POLST FORM Completed--Document signed and will be scanned, original & copies provided to Ronen.    DNAR/Selective Treatment    Assessment and Recommendations       Principal Problem:    Abdominal pain, acute  Active Problems:    Hypokalemia    Abnormal CT of the chest    Lower extremity edema    Abnormal finding of diagnostic imaging    Metastatic adenocarcinoma to esophagus (HCC)    Klebsiella pneumoniae sepsis (HCC)    Septic shock (HCC)    Acalculous cholecystitis    Severe  malnutrition (HCC)    Metastatic adenocarcinoma (HCC)    Goals of care: established and treatment focused   Ronen will continue with chemo & oncology plan as per Dr. Foster's recommendations.   Await surgery eval/ recommendations.   Code Status: DNAR/Selective Treatment  Healthcare Agent's Name: Alysha Mix    Symptoms/Pain:  continue with Oxy IR 5mg tid & dilaudid IV panel.   - continue adjuvant pain management with baclofen, dicyclomine & lyrica.     Discussed today's visit with RN.    Palliative Care Follow Up: Palliative care team will Continue to follow while inpatient.  Palliative care follow up outpatient: Established with [facility] .    Thank you for allowing Palliative Care services to participate in the care of Dontae Cortez Jr..    A total of  55  minutes were spent on this follow up, which included all of the following: chart review, direct face to face contact, history taking, physical examination, counseling and coordinating care, and documentation.  > 16 min of time spent reviewing & completing ACP documents.     Lexy Stewart, ARTEMIO  10/3/2024  12:15 PM  Palliative Care Services    The 21st Century Cures Act makes medical notes like these available to patients in the interest of transparency. Please be advised this is a medical document. Medical documents are intended to carry relevant information, facts as evident, and the clinical opinion of the practitioner. The medical note is intended as peer to peer communication and may appear blunt or direct. It is written in medical language and may contain abbreviations or verbiage that are unfamiliar.

## 2024-10-03 NOTE — PROGRESS NOTES
[facility]                       Gastroenterology Follow up Note - [facility]    Dontae Buddy Cortez . Patient Status:  Inpatient    10/15/1970 MRN [id number]   Location [facility] 4NW-A Attending Nixon Vergara MD   Hosp Day # 3 PCP Adrian Horowitz MD     Reason for consultation: Right upper quadrant abdominal pain  Subjective: Patient continues to have some right upper quadrant abdominal pain.  HIDA scan performed yesterday did not show any filling of the gallbladder and was concerning for cholecystitis.  General surgery has been consulted.  He otherwise denies any nausea and emesis.  Review of Systems:   10 point ROS completed and was negative, except for pertinent positive and negatives stated in subjective.    For PMH, PSH, FHx and SHx- please see initial consult note.     Objective: /66   Pulse 70   Temp 97.8 °F (36.6 °C) (Oral)   Resp 17   Ht 6' 2\" (1.88 m)   Wt 172 lb 2.9 oz (78.1 kg)   SpO2 94%   BMI 22.11 kg/m²   Gen: No acute distress  Resp: no respiratory distress  Abd: Soft, Continued tenderness to palpation in the right upper quadrant., non-distended. No rebound tenderness, no guarding.   Neuro: Aox3.       Labs:   Lab Results   Component Value Date    WBC 9.3 10/03/2024    HGB 8.2 10/03/2024    HCT 25.6 10/03/2024    .0 10/03/2024    CREATSERUM 0.74 10/03/2024    BUN 30 10/03/2024     10/03/2024    K 3.4 10/03/2024    K 3.4 10/03/2024     10/03/2024    CO2 26.0 10/03/2024    GLU 89 10/03/2024    CA 8.6 10/03/2024    ALB 2.3 10/03/2024    ALKPHO 140 10/03/2024    BILT 1.0 10/03/2024    AST 63 10/03/2024    ALT 36 10/03/2024     Recent Labs   Lab 10/01/24  0625 10/02/24  0430 10/03/24  0522   * 128* 89   BUN 21 33* 30*   CREATSERUM 1.41* 1.58* 0.74   CA 8.4* 8.5* 8.6*    133* 133*   K 3.7  3.7 3.3*  3.3* 3.4*  3.4*    104 106   CO2 26.0 24.0 26.0     Recent Labs   Lab 10/03/24  0522   RBC 2.72*   HGB 8.2*   HCT  25.6*   MCV 94.1   MCH 30.1   MCHC 32.0   RDW 14.8   NEPRELIM 8.07*   WBC 9.3   .0       Recent Labs   Lab 10/01/24  0625 10/02/24  0430 10/03/24  0522   ALT 30 30 36   AST 21 27 63*       Assessment:  53 yr-old male with extensive hx including CAD s/p CABG, dyslipidemia, and metastatic esophageal adenocarcinoma c/b CBD stricture (CBD stent 9/17/24; Dr Ritchie), PE/DVT (6/2024; Eliquis), and 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 Work-up thus far reveals Klebsiella bacteremia. HIDA scan showing possible acute cholecystitis  Right upper quadrant abdominal pain likely secondary to cholecystitis given HIDA scan results and prior CT showing thickened gallbladder wall  Plan:  N.p.o. until general surgery consultation  General Surgery consulted.  Appreciate recommendations  Continue IV antibiotics per infectious disease  No current signs of biliary obstruction given liver enzymes overall normal with normal bilirubin.  If abdominal pain persists despite treatment of cholecystitis, then consider EUS/EGD with neurolysis  At this time, GI service will sign off.  Please call us back with any further questions or concerns.    Thank you for allowing us to participate in patient's care. Please call us with any questions or concerns.    Giuseppe Sierra DO  [facility]  [phone]

## 2024-10-03 NOTE — PROGRESS NOTES
INFECTIOUS DISEASE  PROGRESS NOTE            [facility]    Dontae Buddy Diego Champion Patient Status:  Inpatient    10/15/1970 MRN [id number]   [facility] 4SW-A Attending Sakina Hernandez MD   Hosp Day # 3 PCP Adrian Horowitz MD     Antibiotics: #3  Cefazolin #2, flagyl    Subjective:  Right abdominal pain    Objective:  Temp:  [97.8 °F (36.6 °C)-98.6 °F (37 °C)] 97.8 °F (36.6 °C)  Pulse:  [63-89] 70  Resp:  [12-21] 17  BP: (109-127)/(58-79) 120/66  SpO2:  [89 %-99 %] 94 %    General: awake alert  Vital signs:Temp:  [97.8 °F (36.6 °C)-98.6 °F (37 °C)] 97.8 °F (36.6 °C)  Pulse:  [63-89] 70  Resp:  [12-21] 17  BP: (109-127)/(58-79) 120/66  SpO2:  [89 %-99 %] 94 %  HEENT: Moist mucous membranes. Extraocular muscles are intact.  Neck: supple no masses  Respiratory: Non labored, symmetric excursion, normal respirations  Port intact  Cardiovascular: no irregularities in rhythm  Abdomen: Soft, tender RUQ.   Musculoskeletal: joints: no swelling   Integument: No lesions. No erythema. No open wounds.  Labs:     COVID-19 Lab Results    COVID-19  Lab Results   Component Value Date    COVID19 Not Detected 2024    COVID19 Not Detected 2024    COVID19 Not Detected 2024       Pro-Calcitonin  Recent Labs   Lab 10/01/24  0625   PCT 3.06*       Cardiac  No results for input(s): \"TROP\", \"PBNP\" in the last 168 hours.    Creatinine Kinase  No results for input(s): \"CK\" in the last 168 hours.    Inflammatory Markers  Recent Labs   Lab 24  0637 10/02/24  1035   CRP 22.40*  --    MYAH  --  791*       Recent Labs   Lab 10/03/24  0522   RBC 2.72*   HGB 8.2*   HCT 25.6*   MCV 94.1   MCH 30.1   MCHC 32.0   RDW 14.8   NEPRELIM 8.07*   WBC 9.3   .0         Recent Labs   Lab 10/01/24  0625 10/02/24  0430 10/03/24  0522   * 128* 89   BUN 21 33* 30*   CREATSERUM 1.41* 1.58* 0.74   CA 8.4* 8.5* 8.6*   ALB 2.9* 2.4* 2.3*    133* 133*   K 3.7  3.7 3.3*  3.3* 3.4*  3.4*    104 106    CO2 26.0 24.0 26.0   ALKPHO 159* 138* 140*   AST 21 27 63*   ALT 30 30 36   BILT 1.3* 1.1 1.0   TP 5.3* 5.0* 4.9*       No results found for: \"VANCT\"  Microbiology    Hospital Encounter on 09/30/24   1. Blood Culture     Status: None (Preliminary result)    Collection Time: 10/01/24 12:19 PM    Specimen: Blood,peripheral   Result Value Ref Range    Blood Culture Result No Growth 1 Day N/A         Problem list reviewed:  Patient Active Problem List   Diagnosis    Primary hypertension    Hyperlipidemia with target low density lipoprotein (LDL) cholesterol less than 70 mg/dL    Coronary artery disease involving coronary bypass graft of native heart with angina pectoris (HCC)    S/P CABG x 4    Nontoxic multinodular goiter    Pulmonary nodules    Thrombophlebitis leg    BRANDAN (generalized anxiety disorder)    Right shoulder Arthroscopy acromioplasty ,distal  clavicle resection, rotator cuff/labral debridment  Global 05/13/2021    Right bicipital tenosynovitis    Malignant neoplasm of esophagus (HCC)    Pleural effusion    Esophageal anastomotic leak    Esophageal obstruction    On total parenteral nutrition (TPN)    Mechanical complication of esophagostomy (HCC)    Abdominal pain of unknown etiology    Migration of esophageal stent    Gastroparesis    Esophageal carcinoma (HCC)    Normocytic anemia    Esophageal fistula    Subacute cough    Acquired bronchoesophageal fistula (HCC)    Pneumonia of both lower lobes due to infectious organism    Malignant neoplasm of pancreas (HCC)    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 difficile colitis    Dysphagia, unspecified type    Dyspnea and respiratory abnormalities     Hypokalemia    Dysphagia    Narcotic drug use    History of esophageal cancer    Palliative care by specialist    Neoplasm related pain    Endobronchial mass    Hyponatremia    Thrombocytopenia (HCC)    Acute kidney injury (HCC)    Hyperglycemia    Aspiration pneumonitis (HCC)    C. difficile diarrhea    Aspiration pneumonia (HCC)    Malignant neoplasm metastatic to liver (HCC)    Hyperlipidemia    Nausea vomiting and diarrhea    Fistula, bronchoesophageal (HCC)    Iron deficiency anemia, unspecified iron deficiency anemia type    Azotemia    Palliative care encounter    Goals of care, counseling/discussion    Cancer related pain    Anemia    Fistula    Acute cough    Pneumonia of right lung due to infectious organism, unspecified part of lung    Bacteremia    Acute postoperative pain    Exocrine pancreatic insufficiency (HCC)    Hypertriglyceridemia    Acute pulmonary embolism without acute cor pulmonale, unspecified pulmonary embolism type (HCC)    Acute deep vein thrombosis (DVT) of popliteal vein of right lower extremity (HCC)    Altered mental status, unspecified altered mental status type    Transaminitis    Biliary obstruction (HCC)    Hyperbilirubinemia    Esophageal adenocarcinoma (HCC)    Cancer associated pain    Abdominal pain, acute    Abnormal CT of the chest    Lower extremity edema    Abnormal finding of diagnostic imaging    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 Klebsiella pneumoniae bacteremia in association with cholecystitis    -underlying metastatic esophageal cancer on chemo, port in place  -recent PD stent on 9/17 for malignant obstruction  Admitted 9/30 abdominal pain  Increasing RUQ tenderness  CT showing gallbladder distention, HIDA no filling   -surgery and GI following  Cholecsytostomy tube    IV  cefazolin, with addition of metronidazole      Conner Bell MD, MD [facility]  [phone]

## 2024-10-03 NOTE — PROGRESS NOTES
Assumed care at 2330. New order for Surgery consult, Dr Burroughs notified. Patient is npo. Refused Tele, MD notified. Still requires iv dilaudid for right Upper quadrant pain. Port intact. Remains on 1 liter O2

## 2024-10-03 NOTE — H&P (VIEW-ONLY)
[facility]  Report of Consultation    Dontae Cortez Jr. Patient Status:  Inpatient    10/15/1970 MRN [id number]   Location [facility] 4NW-A Attending Nixon Vergara MD   Hosp Day # 3 PCP Adrian Horowitz MD     Requesting Physician:  Dr. Guy Vergara    Reason for Consultation:  Acute cholecystitis    Chief Complaint:  Abdominal pain    History of Present Illness:  Dontae Cortez Jr. is a 53 year old male with a history of stage IV esophageal cancer who presents to [facility] on 2024 for evaluation of abdominal pain.  The patient had a recent admission to [facility] for evaluation of elevated LFTs.  He under went ERCP with biliary sphincterotomy and common bile duct/pancreatic duct stent placement on 2024 by Dr. Ritchie.  He had a CT a of the chest/abdomen and pelvis with findings of progressive hepatic metastatic disease.  The patient returns to [facility] for evaluation of diffuse abdominal pain that began 3 to 4 days prior to his admission.  He denied associated nausea or vomiting.  He denied fever or chills.  Upon admission, he was noted to have mildly elevated WBC of 11.9.  Alkaline phosphatase 172 AST 27 ALT 37 total bilirubin 1.3.  Lipase 13.  Lactic acid 0.8.  Ultrasound of the abdomen with trace perihepatic ascites.  HIDA scan with no definitive filling of the gallbladder which was prominent in size.  Findings could represent acute cholecystitis.        History:  Past Medical History:    Back problem    Belching    Black stools    Borderline diabetes    Dx in 2013 - HgA1C 6.2%    C. difficile diarrhea    pt treated and without symptoms    Chest pain    Coronary artery disease    On 13: CABG x 4 with LIMA to LAD and SVG to diagonal, OM and PDA    Decorative tattoo    Depression    Difficult intubation    h/o esophagectomy for CA; developed esophagobronchial vistula    Disorder of liver    LIVER CA    Esophageal cancer (HCC)    completed chemo     Esophageal reflux    Essential hypertension    Exposure to medical diagnostic radiation    last tx 8/18/2022    Frequent urination    Gastroparesis    Heartburn    High blood pressure    High cholesterol    Found when I had quadruple bypass    History of COVID-19    asymptomatic - pt was dx during a hospitalization for another diagnosis. No continued symptoms    History of stomach ulcers    Hyperlipidemia    Hyperlipidemia LDL goal < 70    Indigestion    Morbid obesity with BMI of 40.0-44.9, adult (HCC)    Muscle weakness    Nausea vomiting and diarrhea    Nontoxic multinodular goiter    Dx in 8/2013: pt was told that imaging showed thyroid cysts per PCP    Pancreatic cancer (HCC)    last dose 12/4/2023 is scheduled for another round 12/27/23    Peripheral vascular disease (HCC)    pt denies    Personal history of antineoplastic chemotherapy    for esophageal cancer/completed    Personal history of antineoplastic chemotherapy    pancreatic cancer    Personal history of antineoplastic chemotherapy    Last treatment 3/12/24    Problems with swallowing    Pulmonary nodules    Dx in 8/2013: CT chest showed small bilateral fissural-based lung nodules less than 1 cm    S/P CABG x 4    On 8/16/13: CABG x 4 with LIMA to LAD and SVG to diagonal, OM and PDA    Shortness of breath    when coughing; no oxygen    Vomiting     Past Surgical History:   Procedure Laterality Date    Appendectomy      Appendectomy      Arthroscopy of joint unlisted      right shoulder    Cabg      On 8/16/13: CABG x 4 with LIMA to LAD and SVG to diagonal, OM and PDA    Cardiac cath lab      On 8/14/2013: cardiac cath showed 3-vessel disease    Other surgical history      1.       Laparoscopic robotic-assisted esophagogastrectomy.    Port, indwelling, imp       Family History   Problem Relation Age of Onset    Cancer Mother         breast and colon     Diabetes Neg       reports that he quit smoking about 11 years ago. His smoking use included  cigarettes. He started smoking about 38 years ago. He has a 27 pack-year smoking history. He has never been exposed to tobacco smoke. He has never used smokeless tobacco. He reports that he does not drink alcohol and does not use drugs.    Allergies:  Allergies   Allergen Reactions    Oxaliplatin HIVES     Shaking        Medications:  Medications Prior to Admission   Medication Sig    Potassium Chloride ER 20 MEQ Oral Tab CR Take 1 tablet by mouth daily.    baclofen 10 MG Oral Tab Take 1 tablet (10 mg total) by mouth 3 (three) times daily.    morphINE ER 30 MG Oral Tab CR Take 1 tablet (30 mg total) by mouth every 12 (twelve) hours.    oxyCODONE 10 MG Oral Tab Take 1 tablet (10 mg total) by mouth every 4 (four) hours as needed (for cancer related pain).    pregabalin 25 MG Oral Cap Take 1 capsule (25 mg total) by mouth 2 (two) times daily.    losartan 100 MG Oral Tab Take 1 tablet (100 mg total) by mouth daily.    OLANZapine 5 MG Oral Tab Take 1 tablet (5 mg total) by mouth nightly.    prochlorperazine (COMPAZINE) 10 mg tablet Take 1 tablet (10 mg total) by mouth every 6 (six) hours as needed for Nausea.    metoprolol tartrate 25 MG Oral Tab Take 1 tablet (25 mg total) by mouth 2x Daily(Beta Blocker).    ondansetron 8 MG Oral Tablet Dispersible Take 1 tablet (8 mg total) by mouth every 8 (eight) hours as needed for Nausea.    furosemide 20 MG Oral Tab Take 1 tablet (20 mg total) by mouth daily.    apixaban 5 MG Oral Tab Take 1 tablet (5 mg total) by mouth 2 (two) times daily.         Current Facility-Administered Medications:     sodium ferric gluconate (Ferrlecit) 125 mg in sodium chloride 0.9% 100mL IVPB premix, 125 mg, Intravenous, Daily    ceFAZolin (Ancef) 2g in 10mL IV syringe premix, 2 g, Intravenous, Q8H    baclofen (Lioresal) tab 5 mg, 5 mg, Oral, BID    oxyCODONE immediate release tab 5 mg, 5 mg, Oral, Q8H    vancomycin (Vancocin) cap 125 mg, 125 mg, Oral, Daily    heparin (Porcine) 100 Units/mL lock flush  500 Units, 5 mL, Intravenous, PRN    [Held by provider] apixaban (Eliquis) tab 5 mg, 5 mg, Oral, BID    OLANZapine (ZyPREXA) tab 5 mg, 5 mg, Oral, Nightly    pantoprazole (Protonix) DR tab 40 mg, 40 mg, Oral, Before breakfast    pregabalin (Lyrica) cap 25 mg, 25 mg, Oral, BID    sertraline (Zoloft) tab 200 mg, 200 mg, Oral, Daily    acetaminophen (Tylenol Extra Strength) tab 500 mg, 500 mg, Oral, Q4H PRN    melatonin tab 3 mg, 3 mg, Oral, Nightly PRN    glycerin-hypromellose- (Artificial Tears) 0.2-0.2-1 % ophthalmic solution 1 drop, 1 drop, Both Eyes, QID PRN    sodium chloride (Saline Mist) 0.65 % nasal solution 1 spray, 1 spray, Each Nare, Q3H PRN    ondansetron (Zofran) 4 MG/2ML injection 4 mg, 4 mg, Intravenous, Q6H PRN    prochlorperazine (Compazine) 10 MG/2ML injection 5 mg, 5 mg, Intravenous, Q8H PRN    HYDROmorphone (Dilaudid) 1 MG/ML injection 0.2 mg, 0.2 mg, Intravenous, Q2H PRN **OR** HYDROmorphone (Dilaudid) 1 MG/ML injection 0.4 mg, 0.4 mg, Intravenous, Q2H PRN **OR** HYDROmorphone (Dilaudid) 1 MG/ML injection 0.8 mg, 0.8 mg, Intravenous, Q2H PRN      Physical Exam:  /66   Pulse 70   Temp 97.8 °F (36.6 °C) (Oral)   Resp 17   Ht 74\"   Wt 172 lb 2.9 oz (78.1 kg)   SpO2 94%   BMI 22.11 kg/m²   Physical Exam  Constitutional:       General: He is not in acute distress.     Appearance: Normal appearance. He is not ill-appearing.   HENT:      Head: Normocephalic and atraumatic.   Cardiovascular:      Rate and Rhythm: Normal rate and regular rhythm.      Pulses: Normal pulses.   Pulmonary:      Effort: Pulmonary effort is normal. No respiratory distress.   Abdominal:      General: There is distension.      Palpations: Abdomen is soft.      Tenderness: There is abdominal tenderness. There is no guarding or rebound.      Comments: Abdomen is soft, mild distended, positive right upper quadrant tenderness to palpation.   Musculoskeletal:         General: Normal range of motion.      Cervical  back: Normal range of motion.   Skin:     General: Skin is warm and dry.   Neurological:      General: No focal deficit present.      Mental Status: He is alert and oriented to person, place, and time.   Psychiatric:         Mood and Affect: Mood normal.         Behavior: Behavior normal.         Laboratory Data:  Recent Labs   Lab 10/01/24  0625 10/02/24  0431 10/03/24  0522   RBC 3.07* 2.88* 2.72*   HGB 9.4* 8.7* 8.2*   HCT 27.8* 26.5* 25.6*   MCV 90.6 92.0 94.1   MCH 30.6 30.2 30.1   MCHC 33.8 32.8 32.0   RDW 14.6 14.6 14.8   NEPRELIM 11.48* 12.48* 8.07*   WBC 13.2* 14.2* 9.3   .0 203.0 156.0       Recent Labs   Lab 10/01/24  0625 10/02/24  0430 10/03/24  0522   * 128* 89   BUN 21 33* 30*   CREATSERUM 1.41* 1.58* 0.74   CA 8.4* 8.5* 8.6*   ALB 2.9* 2.4* 2.3*    133* 133*   K 3.7  3.7 3.3*  3.3* 3.4*  3.4*    104 106   CO2 26.0 24.0 26.0   ALKPHO 159* 138* 140*   AST 21 27 63*   ALT 30 30 36   BILT 1.3* 1.1 1.0   TP 5.3* 5.0* 4.9*         Recent Labs   Lab 10/01/24  0811 10/02/24  0431   PTP 40.7* 28.1*   INR 4.19* 2.60*         NM GB HEPATOBILIARY SCAN WITH PHARMACY  (CPT=78227)    Result Date: 10/2/2024  CONCLUSION:  No definitive filling of the gallbladder which was prominent in size and seen at the anterior inferior margin of the right hepatic lobe on the CT from 9/30/2024.  These imaging findings could represent acute cholecystitis in the appropriate clinical setting.   LOCATION:  [place]   Dictated by (CST): Stromberg, LeRoy, MD on 10/02/2024 at 7:51 PM     Finalized by (CST): Stromberg, LeRoy, MD on 10/02/2024 at 7:56 PM          Cristina Carrion PA-C  10/3/2024  9:28 AM    Is this a shared or split note between Advanced Practice Provider and Physician? Yes      Medical Decision Making         Impression:  Patient Active Problem List   Diagnosis    Primary hypertension    Hyperlipidemia with target low density lipoprotein (LDL) cholesterol less than 70 mg/dL    Coronary artery  disease involving coronary bypass graft of native heart with angina pectoris (HCC)    S/P CABG x 4    Nontoxic multinodular goiter    Pulmonary nodules    Thrombophlebitis leg    BRANDAN (generalized anxiety disorder)    Right shoulder Arthroscopy acromioplasty ,distal  clavicle resection, rotator cuff/labral debridment  Global 05/13/2021    Right bicipital tenosynovitis    Malignant neoplasm of esophagus (HCC)    Pleural effusion    Esophageal anastomotic leak    Esophageal obstruction    On total parenteral nutrition (TPN)    Mechanical complication of esophagostomy (HCC)    Abdominal pain of unknown etiology    Migration of esophageal stent    Gastroparesis    Esophageal carcinoma (HCC)    Normocytic anemia    Esophageal fistula    Subacute cough    Acquired bronchoesophageal fistula (HCC)    Pneumonia of both lower lobes due to infectious organism    Malignant neoplasm of pancreas (HCC)    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 difficile colitis    Dysphagia, unspecified type    Dyspnea and respiratory abnormalities    Hypokalemia    Dysphagia    Narcotic drug use    History of esophageal cancer    Palliative care by specialist    Neoplasm related pain    Endobronchial mass    Hyponatremia    Thrombocytopenia (HCC)    Acute kidney injury (HCC)    Hyperglycemia    Aspiration pneumonitis (HCC)    C. difficile diarrhea    Aspiration pneumonia (HCC)    Malignant neoplasm metastatic to liver (HCC)    Hyperlipidemia    Nausea vomiting and diarrhea    Fistula, bronchoesophageal (HCC)    Iron deficiency anemia, unspecified iron deficiency anemia type    Azotemia    Palliative care encounter    Goals of care,  counseling/discussion    Cancer related pain    Anemia    Fistula    Acute cough    Pneumonia of right lung due to infectious organism, unspecified part of lung    Bacteremia    Acute postoperative pain    Exocrine pancreatic insufficiency (HCC)    Hypertriglyceridemia    Acute pulmonary embolism without acute cor pulmonale, unspecified pulmonary embolism type (HCC)    Acute deep vein thrombosis (DVT) of popliteal vein of right lower extremity (HCC)    Altered mental status, unspecified altered mental status type    Transaminitis    Biliary obstruction (HCC)    Hyperbilirubinemia    Esophageal adenocarcinoma (HCC)    Cancer associated pain    Abdominal pain, acute    Abnormal CT of the chest    Lower extremity edema    Abnormal finding of diagnostic imaging    Metastatic adenocarcinoma to esophagus (HCC)    Klebsiella pneumoniae sepsis (HCC)    Septic shock (HCC)    Acalculous cholecystitis    Severe malnutrition (HCC)       53-year-old gentleman who was admitted through [facility] ED on September 30 for complaints of abdominal pain.  The patient does have a history of stage IV esophageal carcinoma.  He is status post esophagectomy with gastric pull-up complicated by esophageal bronchial fistula.  The patient was seen by GI service for elevated LFTs.  He did undergo ERCP, sphincterotomy with placement of CBD and pancreatic duct stent on September 17, 2024.  Workup revealed leukocytosis of 14.2 yesterday, decreased to 9.3 today.  Hemoglobin decreased 8.7 yesterday, 8.2 today..  LFTs within normal limits except alkaline phosphatase 140 , total bilirubin 1.0.  CT scan on admission revealed interval development of groundglass airspace opacities bilateral upper and lower lobes.  Severe coronary artery atherosclerotic changes are noted but stable.  Mild to moderate ascites is noted.  Intrahepatic metastatic lesions are again noted.  Pancreatic mass is also again noted.  CBD stent in place.  Interval development of  colonic wall thickening extending from the transverse colon.  Development of stranding of the mesentery which may be related to ascites.  Diffuse edema of the subcutaneous tissue of the chest wall abdomen and flank is also seen.  HIDA scan was performed and this revealed no filling of the gallbladder with a prominent gallbladder consistent with acute cholecystitis  On physical examination, the patient does have focal epigastric and right upper quadrant abdominal tenderness.  There is no evidence of guarding or rebound.  Treatment options were reviewed in detail with the patient.  At this time the recommendation is to proceed with percutaneous cholecystostomy tube placement for acute acalculous cholecystitis due to multiple comorbidities and advanced esophageal carcinoma.  Jeffrey has no further questions at this time and will proceed with IR placement of cholecystostomy tube as discussed.  Interventional radiology has been notified.  DAVID Blount MD, FACS    Please note that this report has been produced using speech recognition software and may contain errors related to that system including but not limited to errors in grammar, punctuation and spelling as well as words and phrases that possibly may have been recognized inappropriately.  If there are any questions or concerns please contact the dictating provider for clarification.  The 21st Century Cures Act makes medical notes like these available to patients in the interest of trans parency. Please be advised this is a medical document. Medical documents are intended to carry relevant information, facts as evident, and the clinical opinion of the practitioner. The medical note is intended as peer to peer communication and may appear blunt or direct. It is written in medical language and may contain abbreviations or verbiage that are unfamiliar.  If there are any questions or concerns please contact the dictating provider for  clarification.

## 2024-10-03 NOTE — PLAN OF CARE
Problem: RESPIRATORY - ADULT  Goal: Achieves optimal ventilation and oxygenation  Description: INTERVENTIONS:  - Assess for changes in respiratory status  - Assess for changes in mentation and behavior  - Position to facilitate oxygenation and minimize respiratory effort  - Oxygen supplementation based on oxygen saturation or ABGs  - Provide Smoking Cessation handout, if applicable  - Encourage broncho-pulmonary hygiene including cough, deep breathe, Incentive Spirometry  - Assess the need for suctioning and perform as needed  - Assess and instruct to report SOB or any respiratory difficulty  - Respiratory Therapy support as indicated  - Manage/alleviate anxiety  - Monitor for signs/symptoms of CO2 retention  Outcome: Progressing     Problem: GASTROINTESTINAL - ADULT  Goal: Maintains or returns to baseline bowel function  Description: INTERVENTIONS:  - Assess bowel function  - Maintain adequate hydration with IV or PO as ordered and tolerated  - Evaluate effectiveness of GI medications  - Encourage mobilization and activity  - Obtain nutritional consult as needed  - Establish a toileting routine/schedule  - Consider collaborating with pharmacy to review patient's medication profile  Outcome: Progressing     Problem: METABOLIC/FLUID AND ELECTROLYTES - ADULT  Goal: Hemodynamic stability and optimal renal function maintained  Description: INTERVENTIONS:  - Monitor labs and assess for signs and symptoms of volume excess or deficit  - Monitor intake, output and patient weight  - Monitor urine specific gravity, serum osmolarity and serum sodium as indicated or ordered  - Monitor response to interventions for patient's volume status, including labs, urine output, blood pressure (other measures as available)  - Encourage oral intake as appropriate  - Instruct patient on fluid and nutrition restrictions as appropriate  Outcome: Not Progressing     Problem: PAIN - ADULT  Goal: Verbalizes/displays adequate comfort level or  patient's stated pain goal  Description: INTERVENTIONS:  - Encourage pt to monitor pain and request assistance  - Assess pain using appropriate pain scale  - Administer analgesics based on type and severity of pain and evaluate response  - Implement non-pharmacological measures as appropriate and evaluate response  - Consider cultural and social influences on pain and pain management  - Manage/alleviate anxiety  - Utilize distraction and/or relaxation techniques  - Monitor for opioid side effects  - Notify MD/LIP if interventions unsuccessful or patient reports new pain  - Anticipate increased pain with activity and pre-medicate as appropriate  Outcome: Not Progressing   On and off pain meds given via left ann cath, Will be npo after midnight for cholecystostomy tomorrow,Patient   And wife aware, Has fair appetite,,On ancef syringe abt, Wife at bedside,Attended needs.

## 2024-10-04 ENCOUNTER — APPOINTMENT (OUTPATIENT)
Dept: INTERVENTIONAL RADIOLOGY/VASCULAR | Facility: HOSPITAL | Age: 54
End: 2024-10-04
Attending: PHYSICIAN ASSISTANT
Payer: COMMERCIAL

## 2024-10-04 PROBLEM — K81.0 ACUTE CHOLECYSTITIS: Status: ACTIVE | Noted: 2024-10-04

## 2024-10-04 PROBLEM — K81.0 ACUTE CHOLECYSTITIS: Status: ACTIVE | Noted: 2024-01-01

## 2024-10-04 LAB
ALBUMIN SERPL-MCNC: 2.6 G/DL (ref 3.2–4.8)
ALBUMIN/GLOB SERPL: 1.1 {RATIO} (ref 1–2)
ALP LIVER SERPL-CCNC: 176 U/L
ALT SERPL-CCNC: 27 U/L
ANION GAP SERPL CALC-SCNC: 4 MMOL/L (ref 0–18)
AST SERPL-CCNC: 50 U/L (ref ?–34)
BASOPHILS # BLD AUTO: 0.03 X10(3) UL (ref 0–0.2)
BASOPHILS NFR BLD AUTO: 0.3 %
BILIRUB SERPL-MCNC: 1 MG/DL (ref 0.3–1.2)
BUN BLD-MCNC: 21 MG/DL (ref 9–23)
CALCIUM BLD-MCNC: 8.2 MG/DL (ref 8.7–10.4)
CHLORIDE SERPL-SCNC: 105 MMOL/L (ref 98–112)
CO2 SERPL-SCNC: 27 MMOL/L (ref 21–32)
CREAT BLD-MCNC: 0.49 MG/DL
EGFRCR SERPLBLD CKD-EPI 2021: 122 ML/MIN/1.73M2 (ref 60–?)
EOSINOPHIL # BLD AUTO: 0.02 X10(3) UL (ref 0–0.7)
EOSINOPHIL NFR BLD AUTO: 0.2 %
ERYTHROCYTE [DISTWIDTH] IN BLOOD BY AUTOMATED COUNT: 14.6 %
GLOBULIN PLAS-MCNC: 2.4 G/DL (ref 2–3.5)
GLUCOSE BLD-MCNC: 109 MG/DL (ref 70–99)
HCT VFR BLD AUTO: 25.4 %
HGB BLD-MCNC: 8.3 G/DL
IMM GRANULOCYTES # BLD AUTO: 0.24 X10(3) UL (ref 0–1)
IMM GRANULOCYTES NFR BLD: 2.4 %
INR BLD: 2.15 (ref 0.8–1.2)
INR BLD: 2.2 (ref 0.8–1.2)
LYMPHOCYTES # BLD AUTO: 0.54 X10(3) UL (ref 1–4)
LYMPHOCYTES NFR BLD AUTO: 5.3 %
MCH RBC QN AUTO: 29.6 PG (ref 26–34)
MCHC RBC AUTO-ENTMCNC: 32.7 G/DL (ref 31–37)
MCV RBC AUTO: 90.7 FL
MONOCYTES # BLD AUTO: 0.86 X10(3) UL (ref 0.1–1)
MONOCYTES NFR BLD AUTO: 8.5 %
NEUTROPHILS # BLD AUTO: 8.46 X10 (3) UL (ref 1.5–7.7)
NEUTROPHILS # BLD AUTO: 8.46 X10(3) UL (ref 1.5–7.7)
NEUTROPHILS NFR BLD AUTO: 83.3 %
OSMOLALITY SERPL CALC.SUM OF ELEC: 286 MOSM/KG (ref 275–295)
PLATELET # BLD AUTO: 171 10(3)UL (ref 150–450)
POTASSIUM SERPL-SCNC: 4 MMOL/L (ref 3.5–5.1)
POTASSIUM SERPL-SCNC: 4 MMOL/L (ref 3.5–5.1)
PROT SERPL-MCNC: 5 G/DL (ref 5.7–8.2)
PROTHROMBIN TIME: 24.2 SECONDS (ref 11.6–14.8)
PROTHROMBIN TIME: 24.6 SECONDS (ref 11.6–14.8)
RBC # BLD AUTO: 2.8 X10(6)UL
SODIUM SERPL-SCNC: 136 MMOL/L (ref 136–145)
WBC # BLD AUTO: 10.2 X10(3) UL (ref 4–11)

## 2024-10-04 PROCEDURE — 99232 SBSQ HOSP IP/OBS MODERATE 35: CPT | Performed by: INTERNAL MEDICINE

## 2024-10-04 PROCEDURE — 5A0945A ASSISTANCE WITH RESPIRATORY VENTILATION, 24-96 CONSECUTIVE HOURS, HIGH NASAL FLOW/VELOCITY: ICD-10-PCS | Performed by: HOSPITALIST

## 2024-10-04 PROCEDURE — 99497 ADVNCD CARE PLAN 30 MIN: CPT | Performed by: NURSE PRACTITIONER

## 2024-10-04 PROCEDURE — 99233 SBSQ HOSP IP/OBS HIGH 50: CPT | Performed by: INTERNAL MEDICINE

## 2024-10-04 PROCEDURE — 99232 SBSQ HOSP IP/OBS MODERATE 35: CPT | Performed by: NURSE PRACTITIONER

## 2024-10-04 PROCEDURE — 99232 SBSQ HOSP IP/OBS MODERATE 35: CPT | Performed by: STUDENT IN AN ORGANIZED HEALTH CARE EDUCATION/TRAINING PROGRAM

## 2024-10-04 PROCEDURE — 0F9430Z DRAINAGE OF GALLBLADDER WITH DRAINAGE DEVICE, PERCUTANEOUS APPROACH: ICD-10-PCS | Performed by: STUDENT IN AN ORGANIZED HEALTH CARE EDUCATION/TRAINING PROGRAM

## 2024-10-04 RX ORDER — OXYCODONE HYDROCHLORIDE 10 MG/1
10 TABLET ORAL EVERY 4 HOURS PRN
Status: DISCONTINUED | OUTPATIENT
Start: 2024-10-04 | End: 2024-10-07

## 2024-10-04 RX ORDER — HYDROMORPHONE HYDROCHLORIDE 1 MG/ML
INJECTION, SOLUTION INTRAMUSCULAR; INTRAVENOUS; SUBCUTANEOUS
Status: COMPLETED
Start: 2024-10-04 | End: 2024-10-04

## 2024-10-04 RX ORDER — METRONIDAZOLE 500 MG/1
500 TABLET ORAL 2 TIMES DAILY
Qty: 20 TABLET | Refills: 0 | Status: SHIPPED | OUTPATIENT
Start: 2024-10-04 | End: 2024-10-14

## 2024-10-04 RX ORDER — BENZONATATE 100 MG/1
100 CAPSULE ORAL 3 TIMES DAILY PRN
Status: DISCONTINUED | OUTPATIENT
Start: 2024-10-04 | End: 2024-10-12

## 2024-10-04 RX ORDER — METRONIDAZOLE 500 MG/100ML
500 INJECTION, SOLUTION INTRAVENOUS EVERY 12 HOURS
Status: DISCONTINUED | OUTPATIENT
Start: 2024-10-04 | End: 2024-10-07

## 2024-10-04 RX ORDER — MIDAZOLAM HYDROCHLORIDE 1 MG/ML
INJECTION INTRAMUSCULAR; INTRAVENOUS
Status: COMPLETED
Start: 2024-10-04 | End: 2024-10-04

## 2024-10-04 RX ORDER — LIDOCAINE HYDROCHLORIDE 10 MG/ML
INJECTION, SOLUTION INFILTRATION; PERINEURAL
Status: COMPLETED
Start: 2024-10-04 | End: 2024-10-04

## 2024-10-04 RX ORDER — LEVOFLOXACIN 750 MG/1
750 TABLET, FILM COATED ORAL DAILY
Qty: 10 TABLET | Refills: 0 | Status: SHIPPED | OUTPATIENT
Start: 2024-10-04 | End: 2024-10-14

## 2024-10-04 RX ORDER — OXYCODONE HYDROCHLORIDE 10 MG/1
10 TABLET ORAL EVERY 8 HOURS SCHEDULED
Status: DISCONTINUED | OUTPATIENT
Start: 2024-10-04 | End: 2024-10-07

## 2024-10-04 NOTE — INTERVAL H&P NOTE
The above referenced H&P was reviewed by Mendez Myles MD on 10/4/2024, the patient was examined and no significant changes have occurred in the patient's condition since the H&P was performed.  Risks, benefits, alternative treatments and consequences of no treatment were discussed.  We will proceed with procedure as planned.      Mendez Myles MD  10/4/2024  2:37 PM

## 2024-10-04 NOTE — PROGRESS NOTES
Pt A&Ox4 on 1L during shift change but then dropping to 86% o2 bumped oxygen to 4L paged pulm, prn nebs order was given and said to administer one now. Notified resp. Complaining of 9/10 pain prn and scheduled pain medication given. Tolerating medications well per mar. Call light within reach, frequent checks made, needs met.

## 2024-10-04 NOTE — DIETARY NOTE
[facility]   part of [facility]    NUTRITION ASSESSMENT    Pt meets severe malnutrition criteria at this time.    CRITERIA FOR MALNUTRITION DIAGNOSIS:  Criteria for severe malnutrition diagnosis: chronic illness related to wt loss greater than 5% in 1 month, body fat severe depletion, and muscle mass severe depletion      NUTRITION INTERVENTION:    RD nutrition Care Plan- Adjusted diet to least restrictive to maximize intake, Encouraged increased PO intake, and Encouraged small frequent meals with emphasis on high calorie/high protein  Meal and Snacks - Monitor and encourage adequate PO intake.   Medical Food Supplements - Pt not interested at this time. Rationale/use for oral supplements discussed.  Nutrition Education - discussed nutrient dense foods, shakes and smoothies at home, more frequent meals. Pt/family receptive to education, no barriers noted. Handouts provided.       PATIENT STATUS: 10/4 -  pt not in room at time of RD visit.  Pt having procedure, plan for sofy.cystotomy tube. Pt transferred to ICU for lethargy, hypotension on 10/1 and then transferred back to floor 10/2.  Pt having abdominal pain and decreased intake.  Encourage po when able     10/01/24 at risk  53 year old male admitted with abdominal pain and swelling in legs.  Pt with metastatic esophageal and pancreatic cancer.  Met with pt and wife who report decreased intake. He eats small amounts at meal times. He does not like ONS. He has severe muscle and fat depletion.  Pt also with lower extremity edema.        ANTHROPOMETRICS:  Ht: 188 cm (6' 2\")  Wt: 78.1 kg (172 lb 2.9 oz).   BMI: Body mass index is 22.11 kg/m².  IBW: 86.4 kg      WEIGHT HISTORY:   Weight loss: Yes, Severe Wt loss of 12 lbs, 7%, over 1 months     Wt Readings from Last 10 Encounters:   10/02/24 78.1 kg (172 lb 2.9 oz)   09/23/24 73.5 kg (162 lb)   09/15/24 70.3 kg (155 lb)   09/06/24 76.2 kg (168 lb)   08/19/24 76.2 kg (168 lb)   08/14/24 76.4 kg (168 lb 8 oz)    08/02/24 74.2 kg (163 lb 9.6 oz)   07/29/24 76.9 kg (169 lb 8 oz)   07/15/24 74.4 kg (164 lb)   07/09/24 73.5 kg (162 lb)        NUTRITION:  Diet:       Procedures    Full liquid diet Is Patient on Accuchecks? No      Food Allergies: No  Cultural/Ethnic/Jainism Preferences Addressed: Yes    Percent Meals Eaten (last 3 days)       None            GI system review: WNL Last BM Date: 10/03/24  Skin and wounds: none    NUTRITION RELATED PHYSICAL FINDINGS:     1. Body Fat/Muscle Mass: severe depletion body fat Orbital fat pad and Buccal fat pad and severe muscle depletion Temple region and Clavicle region     2. Fluid Accumulation: lower extremity edema     NUTRITION PRESCRIPTION: 70.8kg  Calories: 7786-7386 calories/day (25-30 kcal/kg)  Protein:  grams protein/day (1.2-1.5 grams protein per kg)  Fluid: ~1 ml/kcal or per MD discretion    NUTRITION DIAGNOSIS/PROBLEM:  Malnutrition related to physiological causes as evidenced by documented/reported insufficient oral intake, documented/reported unintentional weight loss, loss of fat mass, and loss of muscle mass      MONITOR AND EVALUATE/NUTRITION GOALS:  PO intake of 75% of meals TID - Not met, Continues  Weight stable within 1 to 2 lbs during admission - Ongoing      MEDICATIONS:  Reviewed    LABS:  Reviewed    Pt is at High nutrition risk        Juhi Valentino RD,LDN  Clinical Dietitian  [id number]

## 2024-10-04 NOTE — PLAN OF CARE
Problem: CARDIOVASCULAR - ADULT  Goal: Maintains optimal cardiac output and hemodynamic stability  Description: INTERVENTIONS:  - Monitor vital signs, rhythm, and trends  - Monitor for bleeding, hypotension and signs of decreased cardiac output  - Evaluate effectiveness of vasoactive medications to optimize hemodynamic stability  - Monitor arterial and/or venous puncture sites for bleeding and/or hematoma  - Assess quality of pulses, skin color and temperature  - Assess for signs of decreased coronary artery perfusion - ex. Angina  - Evaluate fluid balance, assess for edema, trend weights  Outcome: Progressing     Problem: RESPIRATORY - ADULT  Goal: Achieves optimal ventilation and oxygenation  Description: INTERVENTIONS:  - Assess for changes in respiratory status  - Assess for changes in mentation and behavior  - Position to facilitate oxygenation and minimize respiratory effort  - Oxygen supplementation based on oxygen saturation or ABGs  - Provide Smoking Cessation handout, if applicable  - Encourage broncho-pulmonary hygiene including cough, deep breathe, Incentive Spirometry  - Assess the need for suctioning and perform as needed  - Assess and instruct to report SOB or any respiratory difficulty  - Respiratory Therapy support as indicated  - Manage/alleviate anxiety  - Monitor for signs/symptoms of CO2 retention  Outcome: Progressing     Problem: GASTROINTESTINAL - ADULT  Goal: Maintains or returns to baseline bowel function  Description: INTERVENTIONS:  - Assess bowel function  - Maintain adequate hydration with IV or PO as ordered and tolerated  - Evaluate effectiveness of GI medications  - Encourage mobilization and activity  - Obtain nutritional consult as needed  - Establish a toileting routine/schedule  - Consider collaborating with pharmacy to review patient's medication profile  Outcome: Progressing     Problem: METABOLIC/FLUID AND ELECTROLYTES - ADULT  Goal: Hemodynamic stability and optimal renal  function maintained  Description: INTERVENTIONS:  - Monitor labs and assess for signs and symptoms of volume excess or deficit  - Monitor intake, output and patient weight  - Monitor urine specific gravity, serum osmolarity and serum sodium as indicated or ordered  - Monitor response to interventions for patient's volume status, including labs, urine output, blood pressure (other measures as available)  - Encourage oral intake as appropriate  - Instruct patient on fluid and nutrition restrictions as appropriate  Outcome: Progressing     Problem: SKIN/TISSUE INTEGRITY - ADULT  Goal: Skin integrity remains intact  Description: INTERVENTIONS  - Assess and document risk factors for pressure ulcer development  - Assess and document skin integrity  - Monitor for areas of redness and/or skin breakdown  - Initiate interventions, skin care algorithm/standards of care as needed  Outcome: Progressing  Goal: Incision(s), wounds(s) or drain site(s) healing without S/S of infection  Description: INTERVENTIONS:  - Assess and document risk factors for pressure ulcer development  - Assess and document skin integrity  - Assess and document dressing/incision, wound bed, drain sites and surrounding tissue  - Implement wound care per orders  - Initiate isolation precautions as appropriate  - Initiate Pressure Ulcer prevention bundle as indicated  Outcome: Progressing     Problem: HEMATOLOGIC - ADULT  Goal: Maintains hematologic stability  Description: INTERVENTIONS  - Assess for signs and symptoms of bleeding or hemorrhage  - Monitor labs and vital signs for trends  - Administer supportive blood products/factors, fluids and medications as ordered and appropriate  - Administer supportive blood products/factors as ordered and appropriate  Outcome: Progressing  Goal: Free from bleeding injury  Description: (Example usage: patient with low platelets)  INTERVENTIONS:  - Avoid intramuscular injections, enemas and rectal medication  administration  - Ensure safe mobilization of patient  - Hold pressure on venipuncture sites to achieve adequate hemostasis  - Assess for signs and symptoms of internal bleeding  - Monitor lab trends  - Patient is to report abnormal signs of bleeding to staff  - Avoid use of toothpicks and dental floss  - Use electric shaver for shaving  - Use soft bristle tooth brush  - Limit straining and forceful nose blowing  Outcome: Progressing     Problem: MUSCULOSKELETAL - ADULT  Goal: Return mobility to safest level of function  Description: INTERVENTIONS:  - Assess patient stability and activity tolerance for standing, transferring and ambulating w/ or w/o assistive devices  - Assist with transfers and ambulation using safe patient handling equipment as needed  - Ensure adequate protection for wounds/incisions during mobilization  - Obtain PT/OT consults as needed  - Advance activity as appropriate  - Communicate ordered activity level and limitations with patient/family  Outcome: Progressing     Problem: PAIN - ADULT  Goal: Verbalizes/displays adequate comfort level or patient's stated pain goal  Description: INTERVENTIONS:  - Encourage pt to monitor pain and request assistance  - Assess pain using appropriate pain scale  - Administer analgesics based on type and severity of pain and evaluate response  - Implement non-pharmacological measures as appropriate and evaluate response  - Consider cultural and social influences on pain and pain management  - Manage/alleviate anxiety  - Utilize distraction and/or relaxation techniques  - Monitor for opioid side effects  - Notify MD/LIP if interventions unsuccessful or patient reports new pain  - Anticipate increased pain with activity and pre-medicate as appropriate  Outcome: Progressing     Problem: SAFETY ADULT - FALL  Goal: Free from fall injury  Description: INTERVENTIONS:  - Assess pt frequently for physical needs  - Identify cognitive and physical deficits and behaviors  that affect risk of falls.  - [place] fall precautions as indicated by assessment.  - Educate pt/family on patient safety including physical limitations  - Instruct pt to call for assistance with activity based on assessment  - Modify environment to reduce risk of injury  - Provide assistive devices as appropriate  - Consider OT/PT consult to assist with strengthening/mobility  - Encourage toileting schedule  Outcome: Progressing     Problem: Patient/Family Goals  Goal: Patient/Family Long Term Goal  Description: Patient's Long Term Goal: return home    Interventions:  - pulmonary toilet (coughing/deep breathing/incentive spirometer and flutter valve 10x hourly while awake  - See additional Care Plan goals for specific interventions  Outcome: Progressing  Goal: Patient/Family Short Term Goal  Description: Patient's Short Term Goal: wean levophed    Interventions:   - allow for frequent monitoring of BP  - anitbiotics  - See additional Care Plan goals for specific interventions  Outcome: Progressing

## 2024-10-04 NOTE — PROGRESS NOTES
[facility]   part of [facility]     Hospitalist Progress Note     Dontae Buddy Cortez . Patient Status:  Inpatient    10/15/1970 MRN [id number]   Location [facility] 4NW-A Attending Nixon Vergara MD   Hosp Day # 4 PCP Adrian Horowitz MD     Chief Complaint: abdominal pain    Subjective:     Patient seen with patient's wife at bedside.  Complains of right upper quadrant abdominal pain persisting up to 10 out of 10 with palpation, await cholecystostomy tube, INR still elevated, hematology planning vitamin K.  No nausea vomiting.      Objective:    Review of Systems:   A comprehensive review of systems was completed; pertinent positive and negatives stated in subjective.    Vital signs:  Temp:  [97.6 °F (36.4 °C)-98.2 °F (36.8 °C)] 97.6 °F (36.4 °C)  Pulse:  [70-91] 72  Resp:  [14-16] 16  BP: (132-147)/(69-80) 134/80  SpO2:  [90 %-98 %] 98 %    Physical Exam:    /80 (BP Location: Right arm)   Pulse 72   Temp 97.6 °F (36.4 °C) (Oral)   Resp 16   Ht 6' 2\" (1.88 m)   Wt 172 lb 2.9 oz (78.1 kg)   SpO2 98%   BMI 22.11 kg/m²     General: No acute distress  Respiratory: No wheezes, no rhonchi  Cardiovascular: S1, S2, regular rate and rhythm  Abdomen: Soft, TTP, non-distended, positive bowel sounds  Neuro: No new focal deficits.   Extremities: No edema      Peripheral Pulses:  Radial: Right 1+ or Left 1+      Capillary Refill:  <3 Secs    Skin:  Temp/Moisture: Warm and Dry  Color: Normal            Diagnostic Data:    Labs:  Recent Labs   Lab 24  0637 10/01/24  0625 10/01/24  0811 10/02/24  0431 10/03/24  0522 10/03/24  0743 10/04/24  0630   WBC 11.9* 13.2*  --  14.2* 9.3  --  10.2   HGB 9.9* 9.4*  --  8.7* 8.2*  --  8.3*   MCV 93.7 90.6  --  92.0 94.1  --  90.7   .0 235.0  --  203.0 156.0  --  171.0   INR  --   --  4.19* 2.60*  --  2.02* 2.20*       Recent Labs   Lab 10/02/24  0430 10/03/24  0522 10/04/24  0630   * 89 109*   BUN 33* 30* 21   CREATSERUM 1.58* 0.74 0.49*   CA  8.5* 8.6* 8.2*   ALB 2.4* 2.3* 2.6*   * 133* 136   K 3.3*  3.3* 3.4*  3.4* 4.0  4.0    106 105   CO2 24.0 26.0 27.0   ALKPHO 138* 140* 176*   AST 27 63* 50*   ALT 30 36 27   BILT 1.1 1.0 1.0   TP 5.0* 4.9* 5.0*       Estimated Creatinine Clearance: 190.4 mL/min (A) (based on SCr of 0.49 mg/dL (L)).    No results for input(s): \"TROP\", \"TROPHS\", \"CK\" in the last 168 hours.    Recent Labs   Lab 10/02/24  0431 10/03/24  0743 10/04/24  0630   PTP 28.1* 23.0* 24.6*   INR 2.60* 2.02* 2.20*                  Microbiology    Hospital Encounter on 09/30/24   1. Blood Culture     Status: None (Preliminary result)    Collection Time: 10/01/24 12:19 PM    Specimen: Blood,peripheral   Result Value Ref Range    Blood Culture Result No Growth 2 Days N/A         Imaging: Reviewed in Epic.    Medications:    metRONIDAZOLE  500 mg Intravenous Q12H    phytonadione ([place]qua[place]) 2 mg in sodium chloride 0.9% 50 mL IVPB  2 mg Intravenous Once    sodium ferric gluconate  125 mg Intravenous Daily    ceFAZolin  2 g Intravenous Q8H    baclofen  5 mg Oral BID    oxyCODONE  5 mg Oral Q8H    vancomycin  125 mg Oral Daily    [Held by provider] apixaban  5 mg Oral BID    OLANZapine  5 mg Oral Nightly    pantoprazole  40 mg Oral Before breakfast    pregabalin  25 mg Oral BID    sertraline  200 mg Oral Daily       Assessment & Plan:      # Klebsiella pneumonia sepsis on admission with transient septic shock on 10/1/2024  #Abdominal pain, possible acalculous cholecystitis  -ESR, CRP elevated on admission  -On IV antibiotics-was on IV Zosyn on 9/30/2024, then on IV Rocephin,  which was then changed to IV Ancef per ID based on sensitivity  - transferred to ICU by my colleague Dr. Arshad on 10/1/2024 for hypotension and was on pressor support with Levophed.  ABG with lactate done on 10/1/2024 at the time of transfer to ICU showed lactic acid of 1.1.  Patient received 500 cc of normal saline bolus at the time of transfer to ICU on  10/1/2024, did not receive full sepsis bolus as patient already hypoxic requiring 2 L of oxygen by nasal cannula at that time. Patient did not receive 30ml/kg of fluids despite having: hypotension. The reason for doing so is: a concern for fluid overload. 500mL of fluids were given instead   - blood pressure improved on 10/2/2024 and weaned off of Levophed pressor support on a.m. of 10/2/2024.  Seen by palliative care -patient DNAR/selective treatment  US abdomen Doppler done on 10/1/2024 with normal zeinab in hepatic and portal veins.  Ultrasound abdomen done on 10/2/2024 shows trace perihepatic ascites and fluid demonstrating loculation and septations.  No significant fluid elsewhere within the abdomen.  GI and ID following.    HIDA scan positive -surgeon on consult for possible cholecystostomy tube, patient at this time wants to continue selective treatment including cholecystostomy tube if needed, seen by surgeon, IR consulted for cholecystostomy tube by surgeon and plans on cholecystostomy tube placement  by IR plan when INR less than or equal to 1.5     #Metastatic esophageal cancer with previous resection and gastroesophageostomy  # History of prior bronchoesophageal fistula  # pancreatic mets  #Liver and pulmonary mets  # Anemia secondary to chemotherapy and malignancy  - oncology following, has had outpatient chemotherapy     #H/o PE/DVT  Eliquis     #LE swelling, likely 3rd spacing, hypoalbuminemia     #Hypokalemia, replace PRN     #Cancer related pain, per palliative  ?celiac block for pain control was considered by GI during last admission according to patient  GI following in hospital     #HTN, controlled     #DL, statin    #PUD/GERD, PPi     #CAD with prev CABG    #Depression/anxiety, home meds resumed    #TREVOR, due to above, ATN, start gentle hydration/albumin, monitor    #Coagulopathy, due to liver mets, was also on DOAC  -DOAC on hold for more than 72 hours, plans for IR cholecystostomy tube for  cholecystitis today, INR still elevated at 2.20 today, vitamin K 1 dose being given today per hematologist.  Repeat INR after vitamin K in 1 hour    #Leukocytosis due to Klebsiella pneumoniae sepsis present on admission  -On IV antibiotics  -Follow CBC in a.m.     #Severe malnutrition-dietitian following     Discussed with patient, patient's wife at bedside.  Discussed with DANY Hernandez MD        Supplementary Documentation:     Quality:  DVT Mechanical Prophylaxis:   SCDs, Early ambuation  DVT Pharmacologic Prophylaxis   Medication    heparin (Porcine) 100 Units/mL lock flush 500 Units    [Held by provider] apixaban (Eliquis) tab 5 mg                Code Status: DNAR/Selective Treatment  Neumann: No urinary catheter in place  Neumann Duration (in days):   Central line:    SHUBHAM:     Discharge is dependent on: progress  At this point Mr. Cortez is expected to be discharge to: home    The 21st Century Cures Act makes medical notes like these available to patients in the interest of transparency. Please be advised this is a medical document. Medical documents are intended to carry relevant information, facts as evident, and the clinical opinion of the practitioner. The medical note is intended as peer to peer communication and may appear blunt or direct. It is written in medical language and may contain abbreviations or verbiage that are unfamiliar.     Dietitian Malnutrition Assessment    Evaluation for Malnutrition: Criteria for severe malnutrition diagnosis- chronic illness related to   Wt loss greater than 5% in 1 month., Body fat severe depletion., Muscle mass severe depletion.               RD Malnutrition Care Plan: Adjusted diet to least restrictive to maximize intake., Encouraged increased PO intake., Encouraged small frequent meals with emphasis on high calorie/high protein.    Body Fat/Muscle Mass:          Physician Assessment     Patient has a diagnosis of severe malnutrition

## 2024-10-04 NOTE — PROGRESS NOTES
[facility]  Pulmonary/Critical Care Consult note    Dontae Buddy Ortegacristina Arechiga. Patient Status:  Inpatient    10/15/1970 MRN [id number]   Location [facility] 4NW-A Attending Nixon Vergara MD   Hosp Day # 4 PCP Adrian Horowitz MD       History of Present Illness:    Overnight with increased oxygen requirement to 5 L/min by nasal cannulaBut oxygenating well again daily afternoon and oxygen saturations 94%.  To undergo cholecystostomy tube placement today.    History:  Past Medical History:    Back problem    Belching    Black stools    Borderline diabetes    Dx in 2013 - HgA1C 6.2%    C. difficile diarrhea    pt treated and without symptoms    Chest pain    Coronary artery disease    On 13: CABG x 4 with LIMA to LAD and SVG to diagonal, OM and PDA    Decorative tattoo    Depression    Difficult intubation    h/o esophagectomy for CA; developed esophagobronchial vistula    Disorder of liver    LIVER CA    Esophageal cancer (HCC)    completed chemo    Esophageal reflux    Essential hypertension    Exposure to medical diagnostic radiation    last tx 2022    Frequent urination    Gastroparesis    Heartburn    High blood pressure    High cholesterol    Found when I had quadruple bypass    History of COVID-19    asymptomatic - pt was dx during a hospitalization for another diagnosis. No continued symptoms    History of stomach ulcers    Hyperlipidemia    Hyperlipidemia LDL goal < 70    Indigestion    Morbid obesity with BMI of 40.0-44.9, adult (HCC)    Muscle weakness    Nausea vomiting and diarrhea    Nontoxic multinodular goiter    Dx in 2013: pt was told that imaging showed thyroid cysts per PCP    Pancreatic cancer (HCC)    last dose 2023 is scheduled for another round 23    Peripheral vascular disease (HCC)    pt denies    Personal history of antineoplastic chemotherapy    for esophageal cancer/completed    Personal history of antineoplastic chemotherapy    pancreatic cancer     Personal history of antineoplastic chemotherapy    Last treatment 3/12/24    Problems with swallowing    Pulmonary nodules    Dx in 8/2013: CT chest showed small bilateral fissural-based lung nodules less than 1 cm    S/P CABG x 4    On 8/16/13: CABG x 4 with LIMA to LAD and SVG to diagonal, OM and PDA    Shortness of breath    when coughing; no oxygen    Vomiting     Past Surgical History:   Procedure Laterality Date    Appendectomy      Appendectomy      Arthroscopy of joint unlisted      right shoulder    Cabg      On 8/16/13: CABG x 4 with LIMA to LAD and SVG to diagonal, OM and PDA    Cardiac cath lab      On 8/14/2013: cardiac cath showed 3-vessel disease    Other surgical history      1.       Laparoscopic robotic-assisted esophagogastrectomy.    Port, indwelling, imp       Family History   Problem Relation Age of Onset    Cancer Mother         breast and colon     Diabetes Neg       reports that he quit smoking about 11 years ago. His smoking use included cigarettes. He started smoking about 38 years ago. He has a 27 pack-year smoking history. He has never been exposed to tobacco smoke. He has never used smokeless tobacco. He reports that he does not drink alcohol and does not use drugs.    Allergies:  Allergies   Allergen Reactions    Oxaliplatin HIVES     Shaking        Medications:    Current Facility-Administered Medications:     metRONIDAZOLE in sodium chloride 0.79% (Flagyl) 5 mg/mL IVPB premix 500 mg, 500 mg, Intravenous, Q12H    sodium ferric gluconate (Ferrlecit) 125 mg in sodium chloride 0.9% 100mL IVPB premix, 125 mg, Intravenous, Daily    ipratropium-albuterol (Duoneb) 0.5-2.5 (3) MG/3ML inhalation solution 3 mL, 3 mL, Nebulization, Q6H PRN    ceFAZolin (Ancef) 2g in 10mL IV syringe premix, 2 g, Intravenous, Q8H    baclofen (Lioresal) tab 5 mg, 5 mg, Oral, BID    oxyCODONE immediate release tab 5 mg, 5 mg, Oral, Q8H    vancomycin (Vancocin) cap 125 mg, 125 mg, Oral, Daily    heparin (Porcine) 100  Units/mL lock flush 500 Units, 5 mL, Intravenous, PRN    [Held by provider] apixaban (Eliquis) tab 5 mg, 5 mg, Oral, BID    OLANZapine (ZyPREXA) tab 5 mg, 5 mg, Oral, Nightly    pantoprazole (Protonix) DR tab 40 mg, 40 mg, Oral, Before breakfast    pregabalin (Lyrica) cap 25 mg, 25 mg, Oral, BID    sertraline (Zoloft) tab 200 mg, 200 mg, Oral, Daily    acetaminophen (Tylenol Extra Strength) tab 500 mg, 500 mg, Oral, Q4H PRN    melatonin tab 3 mg, 3 mg, Oral, Nightly PRN    glycerin-hypromellose- (Artificial Tears) 0.2-0.2-1 % ophthalmic solution 1 drop, 1 drop, Both Eyes, QID PRN    sodium chloride (Saline Mist) 0.65 % nasal solution 1 spray, 1 spray, Each Nare, Q3H PRN    ondansetron (Zofran) 4 MG/2ML injection 4 mg, 4 mg, Intravenous, Q6H PRN    prochlorperazine (Compazine) 10 MG/2ML injection 5 mg, 5 mg, Intravenous, Q8H PRN    HYDROmorphone (Dilaudid) 1 MG/ML injection 0.2 mg, 0.2 mg, Intravenous, Q2H PRN **OR** HYDROmorphone (Dilaudid) 1 MG/ML injection 0.4 mg, 0.4 mg, Intravenous, Q2H PRN **OR** HYDROmorphone (Dilaudid) 1 MG/ML injection 0.8 mg, 0.8 mg, Intravenous, Q2H PRN    Intake/Output:    Intake/Output Summary (Last 24 hours) at 10/4/2024 0921  Last data filed at 10/4/2024 0613  Gross per 24 hour   Intake 100 ml   Output 1600 ml   Net -1500 ml      Body mass index is 22.11 kg/m².    Review of Systems  Review of Systems:   A comprehensive 10 point review of systems was completed.  Pertinent positives and negatives noted in the the HPI.         Patient Vitals for the past 24 hrs:   BP Temp Temp src Pulse Resp SpO2 Height Weight   09/30/24 1132 -- -- -- -- -- -- -- 156 lb 1.4 oz (70.8 kg)   09/30/24 1100 148/88 97.6 °F (36.4 °C) Oral 80 18 96 % -- 156 lb (70.8 kg)   09/30/24 0959 (!) 139/92 97.7 °F (36.5 °C) Oral 74 18 98 % -- --   09/30/24 0921 -- -- -- -- -- 99 % -- --   09/30/24 0920 -- -- -- -- 18 (!) 89 % -- --   09/30/24 0914 135/84 -- -- 75 18 93 % -- --   09/30/24 0819 137/89 -- -- 75 18 97 %  -- --   09/30/24 0713 136/77 -- -- 69 18 95 % -- --   09/30/24 0559 142/77 98.8 °F (37.1 °C) Oral 66 22 94 % 6' 2\" (1.88 m) 162 lb 0.6 oz (73.5 kg)     Vitals:    10/03/24 2119 10/03/24 2201 10/04/24 0435 10/04/24 0715   BP:   141/79 134/80   BP Location:   Left arm Right arm   Pulse: 88  72 72   Resp:   16    Temp:   98 °F (36.7 °C) 97.6 °F (36.4 °C)   TempSrc:   Oral Oral   SpO2: 91% 94% 98% 98%   Weight:       Height:             Physical Exam  Constitutional:       General: He is not in acute distress.     Appearance: Normal appearance. He is not ill-appearing or diaphoretic.   HENT:      Head: Normocephalic and atraumatic.      Nose: Nose normal. No congestion or rhinorrhea.      Mouth/Throat:      Mouth: Mucous membranes are moist.      Pharynx: Oropharynx is clear. No oropharyngeal exudate or posterior oropharyngeal erythema.   Eyes:      Extraocular Movements: Extraocular movements intact.      Pupils: Pupils are equal, round, and reactive to light.   Cardiovascular:      Rate and Rhythm: Normal rate.      Pulses: Normal pulses.      Heart sounds: Normal heart sounds. No murmur heard.  Pulmonary:      Effort: Pulmonary effort is normal. No respiratory distress.      Breath sounds: Normal breath sounds. No wheezing or rhonchi.      Comments: Diminished breath sounds in bilateral lower lobes.  Crackles in left lower lung base  Chest:      Chest wall: No tenderness.   Abdominal:      General: Abdomen is flat. Bowel sounds are normal.      Palpations: Abdomen is soft.   Musculoskeletal:         General: Normal range of motion.   Skin:     General: Skin is warm.   Neurological:      General: No focal deficit present.      Mental Status: He is alert and oriented to person, place, and time.   Psychiatric:         Mood and Affect: Mood normal.         Behavior: Behavior normal.         Thought Content: Thought content normal.         Judgment: Judgment normal.            Lab Data Review:  Recent Labs   Lab  10/02/24  0431 10/03/24  0522 10/04/24  0630   WBC 14.2* 9.3 10.2   HGB 8.7* 8.2* 8.3*   HCT 26.5* 25.6* 25.4*   .0 156.0 171.0       Recent Labs   Lab 10/02/24  0430 10/03/24  0522 10/04/24  0630   * 133* 136   K 3.3*  3.3* 3.4*  3.4* 4.0  4.0    106 105   CO2 24.0 26.0 27.0   BUN 33* 30* 21   CREATSERUM 1.58* 0.74 0.49*   CA 8.5* 8.6* 8.2*   ALB 2.4* 2.3* 2.6*   ALKPHO 138* 140* 176*   ALT 30 36 27   AST 27 63* 50*   * 89 109*     Component  Ref Range & Units 10/1/24 0625   Procalcitonin  <0.05 ng/mL 3.06 High      No results for input(s): \"MG\" in the last 168 hours.    Lab Results   Component Value Date    PHOS 2.9 03/15/2024        Recent Labs   Lab 10/02/24  0431 10/03/24  0743 10/04/24  0630   INR 2.60* 2.02* 2.20*       Recent Labs   Lab 10/01/24  1412   ABGPHT 7.40   ZNZPKL8C 43   EUOWJ1I 79*   ABGHCO3 26.1   ABGBE 1.6   TEMP 98.6   TACOS Positive   SITE Left Radial   DEV Nasal cannula   THGB 8.6*       No results for input(s): \"TROP\", \"CKMB\" in the last 168 hours.    Cultures:   Hospital Encounter on 09/30/24   1. Blood Culture     Status: None (Preliminary result)    Collection Time: 10/01/24 12:19 PM    Specimen: Blood,peripheral   Result Value Ref Range    Blood Culture Result No Growth 2 Days N/A           Radiology personally reviewed:  NM GB HEPATOBILIARY SCAN WITH PHARMACY  (CPT=78227)    Result Date: 10/2/2024  CONCLUSION:  No definitive filling of the gallbladder which was prominent in size and seen at the anterior inferior margin of the right hepatic lobe on the CT from 9/30/2024.  These imaging findings could represent acute cholecystitis in the appropriate clinical setting.   LOCATION:  [facility]   Dictated by (CST): Stromberg, LeRoy, MD on 10/02/2024 at 7:51 PM     Finalized by (CST): Stromberg, LeRoy, MD on 10/02/2024 at 7:56 PM       US ABDOMEN LIMITED (CPT=76705)    Result Date: 10/2/2024  CONCLUSION:   Grayscale sonography of the abdomen reveals trace perihepatic  ascites, the fluid demonstrating loculation/septation.  No significant fluid elsewhere within the abdomen.   LOCATION:  [facility]   Dictated by (CST): Richie Jay MD on 10/02/2024 at 12:22 PM     Finalized by (CST): Richie Jay MD on 10/02/2024 at 12:24 PM         Patient Active Problem List   Diagnosis    Primary hypertension    Hyperlipidemia with target low density lipoprotein (LDL) cholesterol less than 70 mg/dL    Coronary artery disease involving coronary bypass graft of native heart with angina pectoris (HCC)    S/P CABG x 4    Nontoxic multinodular goiter    Pulmonary nodules    Thrombophlebitis leg    BRANDAN (generalized anxiety disorder)    Right shoulder Arthroscopy acromioplasty ,distal  clavicle resection, rotator cuff/labral debridment  Global 05/13/2021    Right bicipital tenosynovitis    Malignant neoplasm of esophagus (HCC)    Pleural effusion    Esophageal anastomotic leak    Esophageal obstruction    On total parenteral nutrition (TPN)    Mechanical complication of esophagostomy (HCC)    Abdominal pain of unknown etiology    Migration of esophageal stent    Gastroparesis    Esophageal carcinoma (HCC)    Normocytic anemia    Esophageal fistula    Subacute cough    Acquired bronchoesophageal fistula (HCC)    Pneumonia of both lower lobes due to infectious organism    Malignant neoplasm of pancreas (HCC)    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 difficile colitis    Dysphagia, unspecified type    Dyspnea and respiratory abnormalities    Hypokalemia    Dysphagia    Narcotic drug use    History of esophageal cancer    Palliative care by specialist    Neoplasm related pain    Endobronchial mass     Hyponatremia    Thrombocytopenia (HCC)    Acute kidney injury (HCC)    Hyperglycemia    Aspiration pneumonitis (HCC)    C. difficile diarrhea    Aspiration pneumonia (HCC)    Malignant neoplasm metastatic to liver (HCC)    Hyperlipidemia    Nausea vomiting and diarrhea    Fistula, bronchoesophageal (HCC)    Iron deficiency anemia, unspecified iron deficiency anemia type    Azotemia    Palliative care encounter    Goals of care, counseling/discussion    Cancer related pain    Anemia    Fistula    Acute cough    Pneumonia of right lung due to infectious organism, unspecified part of lung    Bacteremia    Acute postoperative pain    Exocrine pancreatic insufficiency (HCC)    Hypertriglyceridemia    Acute pulmonary embolism without acute cor pulmonale, unspecified pulmonary embolism type (HCC)    Acute deep vein thrombosis (DVT) of popliteal vein of right lower extremity (HCC)    Altered mental status, unspecified altered mental status type    Transaminitis    Biliary obstruction (HCC)    Hyperbilirubinemia    Esophageal adenocarcinoma (HCC)    Cancer associated pain    Abdominal pain, acute    Abnormal CT of the chest    Lower extremity edema    Abnormal finding of diagnostic imaging    Metastatic adenocarcinoma to esophagus (HCC)    Klebsiella pneumoniae sepsis (HCC)    Septic shock (HCC)    Acalculous cholecystitis    Severe malnutrition (HCC)    Metastatic adenocarcinoma (HCC)       Assessment:  Acute hypoxic respiratory failure: Required oxygen 5 L/min nasal cannula overnight but weaned to 4 L/min and currently oxygen saturation 94%.  Pulmonary nodules  Klebsiella pneumoniae bacteremia suspected due to acute cholecystitis  Groundglass opacities involving by lateral upper lobes and left lower lobe peripheral: This could represent atypical bacterial infection versus viral infection versus related to chemotherapy etc.: Elevated ESR and CRP elevated WBC count with left shift.  Procalcitonin is elevated suggesting  active infection  Nonocclusive pulmonary embolism and left upper lobe and left lower lobe branches of pulmonary artery  Mediastinal adenopathy with conglomeration of AP window lymph nodes 2.5 x 2.4 cm  Pancreatic cancer status postchemotherapy  Depression, liver cancer,  GERD,   Dilated esophagus with pull-through procedure previously after esophageal cancer resection  Hypertension,  Hyperlipidemia,  Morbid obesity  History of pulmonary nodules.      Plan:  Wean FiO2 off to keep oxygen saturation between 90% to 92%  Cholecystostomy tube placement per interventional radiology today  Use flutter device every 4 hours as possible to help cough out secretions  Incentive spirometry every 4 hours as possible  DuoNebs every 6 hours as needed  Continue current antibiotics  Infectious disease follow-up   Eliquis on hold  DVT prophylaxis: SCD boots   GI prophylaxis: ---  Will follow for further recommendations  Follow labs and chest x-ray in a.m.    Thank You for allowing me to participate in this patient's care     Dusty Brooks MD    Note to the patient: The 21st Century Cures Act makes medical notes like these available to patients in the interest of transparency. However, be advised that this is a medical document. It is intended as peer to peer communication. It is written in medical language and may contain abbreviations or verbiage that are unfamiliar. It may appear blunt or direct. Medical documents are intended to carry relevant information, facts as evident, and clinical opinion of the practitioner.      Disclaimer: Components of this note were documented using voice recognition system and are subject to errors not corrected at proofreading. Contact the author of this note for any clarifications

## 2024-10-04 NOTE — PROGRESS NOTES
[facility]   part of [facility]  Palliative Care Progress Note    Dontae Cortez Jr. Patient Status:  Inpatient    10/15/1970 MRN [id number]   [facility] 4NW-A Attending Nixon Vergara MD   Hosp Day # 4 PCP Adrian Horowitz MD     History/Other:      Dontae Cortez Jr. is a 53 year old male with history of metastatic pancreatic cancer (dx 2022 see oncology consult for hx details) s/p esophagectomy and chemo (on current new tx regimen 1st cycle received 24), PE (Eliquis), HTN, CAD s/p CABG () who was admitted on 2024 for pain exacerbation, SOB and AMS (per wife). Work up in our hospital revealed CT A/P as noted below with generalized inflammatory processes and lungs with diffuse GGO bilat, also with hypokalemia, hyponatremia and BLE edema.  - RRT 10/1/24-> tx to ICU for lethargy, hypotension, sepsis- improved status & tx to floor 10/2/24. No surgery planned. 10/4/24- planned sofy drain placement.       Consult ordered by:: Taylor Hitchcock for evaluation of Palliative Care needs and Uncontrolled symptoms.    Allergies:  Allergies   Allergen Reactions    Oxaliplatin HIVES     Shaking      Medications:     Current Facility-Administered Medications:     metRONIDAZOLE in sodium chloride 0.79% (Flagyl) 5 mg/mL IVPB premix 500 mg, 500 mg, Intravenous, Q12H    sodium ferric gluconate (Ferrlecit) 125 mg in sodium chloride 0.9% 100mL IVPB premix, 125 mg, Intravenous, Daily    ipratropium-albuterol (Duoneb) 0.5-2.5 (3) MG/3ML inhalation solution 3 mL, 3 mL, Nebulization, Q6H PRN    ceFAZolin (Ancef) 2g in 10mL IV syringe premix, 2 g, Intravenous, Q8H    baclofen (Lioresal) tab 5 mg, 5 mg, Oral, BID    oxyCODONE immediate release tab 5 mg, 5 mg, Oral, Q8H    vancomycin (Vancocin) cap 125 mg, 125 mg, Oral, Daily    heparin (Porcine) 100 Units/mL lock flush 500 Units, 5 mL, Intravenous, PRN    [Held by provider] apixaban (Eliquis) tab 5 mg, 5 mg, Oral, BID    OLANZapine  (ZyPREXA) tab 5 mg, 5 mg, Oral, Nightly    pantoprazole (Protonix) DR tab 40 mg, 40 mg, Oral, Before breakfast    pregabalin (Lyrica) cap 25 mg, 25 mg, Oral, BID    sertraline (Zoloft) tab 200 mg, 200 mg, Oral, Daily    acetaminophen (Tylenol Extra Strength) tab 500 mg, 500 mg, Oral, Q4H PRN    melatonin tab 3 mg, 3 mg, Oral, Nightly PRN    glycerin-hypromellose- (Artificial Tears) 0.2-0.2-1 % ophthalmic solution 1 drop, 1 drop, Both Eyes, QID PRN    sodium chloride (Saline Mist) 0.65 % nasal solution 1 spray, 1 spray, Each Nare, Q3H PRN    ondansetron (Zofran) 4 MG/2ML injection 4 mg, 4 mg, Intravenous, Q6H PRN    prochlorperazine (Compazine) 10 MG/2ML injection 5 mg, 5 mg, Intravenous, Q8H PRN    HYDROmorphone (Dilaudid) 1 MG/ML injection 0.2 mg, 0.2 mg, Intravenous, Q2H PRN **OR** HYDROmorphone (Dilaudid) 1 MG/ML injection 0.4 mg, 0.4 mg, Intravenous, Q2H PRN **OR** HYDROmorphone (Dilaudid) 1 MG/ML injection 0.8 mg, 0.8 mg, Intravenous, Q2H PRN    Subjective    Interval events:    RRT 10/1/24-> tx to ICU for lethargy, hypotension, sepsis- improved status & tx to floor 10/2/24. No surgery planned.  10/4/24- plan for sofy drain placement.     When I entered the room, the patient was awake, alert, and lying in bed. Spouse present at bedside.  Alysha expressed that she would like Ronen's wound evaluated & she is concerned he has not been OOB for a couple of days.     Symptom assessment:  Pain assessment:   24 hour (1485-7267) OME total: 86.5mg. (Hydromorphone IV 3.2mg + oxycodone 15mg).  Current pain: 3/10, described as sharp, located abdomen & back, made worse by movement, alleviated by opioids. Has felt most relief with IV hydromorphone  Pain goal: 3-4/10.     See summary of discussion below.     Review of Systems:  Dyspnea:denies  Coughdenies  Nausea:denies  Appetite:denies    Pertinent items are noted in subjective.  Bowel Movement    No data found in the last 1 encounters.         Objective:     Vital  Signs:  Blood pressure 145/80, pulse 69, temperature 97.8 °F (36.6 °C), temperature source Oral, resp. rate 16, height 6' 2\" (1.88 m), weight 172 lb 2.9 oz (78.1 kg), SpO2 94%.  Body mass index is 22.11 kg/m².  Present Level of pain: 3/10  Non-verbal signs of pain present: No  Current Palliative performance scale PPSv2 (%): 60    Physical Exam:  General: Alert & Awake. In no apparent respiratory distress. Body habitus Thin.  HEENT: AT/NC. No gross focal deficits. MMM   Cardiac: RRR  Lungs: Normal effort on RA  Abdomen: Soft,  non-distended, normal bowel sounds X 4 quadrants - not palpated per pt request, as discomfort worsens.  Extremities: Trace LE edema present  Neurologic: Alert and oriented to person, place, time, and situation   Psychiatric: Mood pleasant mood   Skin: Warm and dry.        Results:     Hematology:  Lab Results   Component Value Date    WBC 10.2 10/04/2024    HGB 8.3 (L) 10/04/2024    HCT 25.4 (L) 10/04/2024    .0 10/04/2024     Coags:  Lab Results   Component Value Date    PT 20.4 (H) 01/30/2014    INR 2.20 (H) 10/04/2024    PTT 80.5 (H) 06/19/2024     Chemistry:  Lab Results   Component Value Date    CREATSERUM 0.49 (L) 10/04/2024    BUN 21 10/04/2024     10/04/2024    K 4.0 10/04/2024    K 4.0 10/04/2024     10/04/2024    CO2 27.0 10/04/2024     (H) 10/04/2024    CA 8.2 (L) 10/04/2024    ALB 2.6 (L) 10/04/2024    ALKPHO 176 (H) 10/04/2024    BILT 1.0 10/04/2024    TP 5.0 (L) 10/04/2024    AST 50 (H) 10/04/2024    ALT 27 10/04/2024    DDIMER 0.38 12/19/2023    BNP 33 08/15/2013    MG 1.8 09/20/2024    PHOS 2.9 03/15/2024    TROP <0.046 07/18/2017     Imaging:  NM GB HEPATOBILIARY SCAN WITH PHARMACY  (CPT=78227)    Result Date: 10/2/2024  CONCLUSION:  No definitive filling of the gallbladder which was prominent in size and seen at the anterior inferior margin of the right hepatic lobe on the CT from 9/30/2024.  These imaging findings could represent acute cholecystitis  in the appropriate clinical setting.   LOCATION:  [place]   Dictated by (CST): Stromberg, LeRoy, MD on 10/02/2024 at 7:51 PM     Finalized by (CST): Stromberg, LeRoy, MD on 10/02/2024 at 7:56 PM           Summary of Discussion   I met with Ronen today, and his wife Alysha was visiting.  They are waiting for sofy drain to be placed. We discussed current pain management, and plan for DC.  We discussed the SE's he has had with multiple trials of opioid medication & that simplifying the regimen would be best for him.   He is agreeable to transitioning to po Oxy, now that his drain will be placed today.  Alysha confirmed she received a copy of Ronen's signed POSLT.   During our discussion, Ronen & Alysha discussed options moving forward & they both shared they are scared.  They both thought Ronen was dying a couple of days ago & they are grateful for more time, however they both know Ronen's time is limited.  Ronen voiced he has discussed with Dr. Foster & understands his treatment options for cancer are limited.  I encouraged them to continue to have conversations & that we can put them in touch with resources when they are ready for more comfort focused care.     In summary, Ronen is in the beginning stages of discussing his end of life, and accepting where he is at.  Palliative will continue to follow Ronen outpatient & support Ronen Encarnacion in his journey.    Advance Care Planning counseling and discussion:   HC POA Documentation Completed--Document in FL3XX. Healthcare Agent's Name: Alysha Mcduffie   POL FORM Completed--Document signed and will be scanned, original & copies provided to Ronen.    DNAR/Selective Treatment    Assessment and Recommendations       Principal Problem:    Abdominal pain, acute  Active Problems:    Hypokalemia    Abnormal CT of the chest    Lower extremity edema    Abnormal finding of diagnostic imaging    Metastatic adenocarcinoma to esophagus (HCC)    Klebsiella pneumoniae sepsis (HCC)     Septic shock (HCC)    Acalculous cholecystitis    Severe malnutrition (HCC)    Metastatic adenocarcinoma (HCC)    Acute cholecystitis    Goals of care: established and treatment focused   Ronen will continue with chemo & oncology plan as per Dr. Foster's recommendations.   Ronen & Alysha will continue to discuss options, and are aware palliative can provide more resources should he change his treatment focus in the future.   Radha drain to be placed today.  Code Status: DNAR/Selective Treatment  Healthcare Agent's Name: Alysha Mcduffie    Symptoms/Pain:    - Increase Oxycodone IR 10mg q 8 scheduled & q4 PRN.   - IV hydromorphone 0.4mg q2h PRN- ONLY to use if po Oxy IR is ineffective.    For DC planning at home:   -Ronen has Oxy IR 10mg tablets, wife to verify how many he has left, he is agreeable to only using Oxy IR 10mg for opioid pain management at home, and is aware to follow up in the cancer center for palliative care.   - DC MSER as he experienced side effects (discontinued on med rec), will DC dicyclomine & use current baclofen dosing.   -Continue lyrica 25mg bid.    Discussed today's visit with RN, wife, Dr Hernandez.    Palliative Care Follow Up: Palliative care team will Continue to follow while inpatient.  Palliative care follow up outpatient: Established with [facility] .    Thank you for allowing Palliative Care services to participate in the care of Dontae Goodwin Diego Champion.    A total of  70  minutes were spent on this follow up, which included all of the following: chart review, direct face to face contact, history taking, physical examination, counseling and coordinating care, and documentation.  > 16 min of time spent reviewing & completing ACP documents.     ARTEMIO Seymour, 10/04/24, 1:24 PM  Palliative Care Services    The 21st Century Cures Act makes medical notes like these available to patients in the interest of transparency. Please be advised this is a medical document. Medical documents  are intended to carry relevant information, facts as evident, and the clinical opinion of the practitioner. The medical note is intended as peer to peer communication and may appear blunt or direct. It is written in medical language and may contain abbreviations or verbiage that are unfamiliar.

## 2024-10-04 NOTE — PROGRESS NOTES
[facility]  Progress Note    Dontae Cortez . Patient Status:  Inpatient    10/15/1970 MRN [id number]   Location [facility] 4NW-A Attending Sakina Hernandez MD   Hosp Day # 4 PCP Adrian Horowitz MD     Subjective:  The patient was seen and examined at bedside.  He continues to report right upper quadrant abdominal pain.    Per the patient's nurse, IR is recommending an INR below 1.8, but preferably 1.5 for cholecystostomy tube placement.  The patient's INR this morning was 2.2    Objective/Physical Exam:  /80 (BP Location: Right arm)   Pulse 72   Temp 97.6 °F (36.4 °C) (Oral)   Resp 16   Ht 74\"   Wt 172 lb 2.9 oz (78.1 kg)   SpO2 98%   BMI 22.11 kg/m²     Intake/Output Summary (Last 24 hours) at 10/4/2024 0823  Last data filed at 10/4/2024 0613  Gross per 24 hour   Intake 100 ml   Output 1600 ml   Net -1500 ml         General: Alert, oriented x3. No acute distress.  HEENT: Normocephalic, atraumatic. No scleral icterus.  Pulmonary: No respiratory distress, effort normal.   Abdomen: Non-distended, without tympany to percussion. Soft, right upper quadrant tenderness to palpation with voluntary guarding. No peritoneal signs.   Extremities: No lower extremity edema. No clubbing or cyanosis.   Skin: Warm, dry. No jaundice.       Labs:  Lab Results   Component Value Date    WBC 10.2 10/04/2024    HGB 8.3 10/04/2024    HCT 25.4 10/04/2024    .0 10/04/2024      Lab Results   Component Value Date    PT 20.4 (H) 2014    PT 22.9 (H) 2014    PT 25.7 (H) 2014    INR 2.20 (H) 10/04/2024    INR 2.02 (H) 10/03/2024    INR 2.60 (H) 10/02/2024     Lab Results   Component Value Date     10/04/2024    K 4.0 10/04/2024    K 4.0 10/04/2024     10/04/2024    CO2 27.0 10/04/2024    BUN 21 10/04/2024    CREATSERUM 0.49 10/04/2024     10/04/2024    CA 8.2 10/04/2024    ALKPHO 176 10/04/2024    ALT 27 10/04/2024    AST 50 10/04/2024    BILT 1.0 10/04/2024    ALB 2.6  10/04/2024    TP 5.0 10/04/2024          Assessment:  Patient Active Problem List   Diagnosis    Primary hypertension    Hyperlipidemia with target low density lipoprotein (LDL) cholesterol less than 70 mg/dL    Coronary artery disease involving coronary bypass graft of native heart with angina pectoris (HCC)    S/P CABG x 4    Nontoxic multinodular goiter    Pulmonary nodules    Thrombophlebitis leg    BRANDAN (generalized anxiety disorder)    Right shoulder Arthroscopy acromioplasty ,distal  clavicle resection, rotator cuff/labral debridment  Global 05/13/2021    Right bicipital tenosynovitis    Malignant neoplasm of esophagus (HCC)    Pleural effusion    Esophageal anastomotic leak    Esophageal obstruction    On total parenteral nutrition (TPN)    Mechanical complication of esophagostomy (HCC)    Abdominal pain of unknown etiology    Migration of esophageal stent    Gastroparesis    Esophageal carcinoma (HCC)    Normocytic anemia    Esophageal fistula    Subacute cough    Acquired bronchoesophageal fistula (HCC)    Pneumonia of both lower lobes due to infectious organism    Malignant neoplasm of pancreas (HCC)    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 difficile colitis    Dysphagia, unspecified type    Dyspnea and respiratory abnormalities    Hypokalemia    Dysphagia    Narcotic drug use    History of esophageal cancer    Palliative care by specialist    Neoplasm related pain    Endobronchial mass    Hyponatremia    Thrombocytopenia (HCC)    Acute kidney injury (HCC)    Hyperglycemia    Aspiration pneumonitis (HCC)    C. difficile diarrhea    Aspiration pneumonia (HCC)    Malignant neoplasm metastatic to liver  (HCC)    Hyperlipidemia    Nausea vomiting and diarrhea    Fistula, bronchoesophageal (HCC)    Iron deficiency anemia, unspecified iron deficiency anemia type    Azotemia    Palliative care encounter    Goals of care, counseling/discussion    Cancer related pain    Anemia    Fistula    Acute cough    Pneumonia of right lung due to infectious organism, unspecified part of lung    Bacteremia    Acute postoperative pain    Exocrine pancreatic insufficiency (HCC)    Hypertriglyceridemia    Acute pulmonary embolism without acute cor pulmonale, unspecified pulmonary embolism type (HCC)    Acute deep vein thrombosis (DVT) of popliteal vein of right lower extremity (HCC)    Altered mental status, unspecified altered mental status type    Transaminitis    Biliary obstruction (HCC)    Hyperbilirubinemia    Esophageal adenocarcinoma (HCC)    Cancer associated pain    Abdominal pain, acute    Abnormal CT of the chest    Lower extremity edema    Abnormal finding of diagnostic imaging    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  We are recommending IR cholecystostomy tube placement  Given the patient's tenderness, we recommend giving him Kcentra and then rechecking his INR for hopeful IR intervention today  N.p.o. for possible procedure today  Antiemetics and analgesics as needed  IV antibiotics per ID recommendation  Medical management per primary  DVT prophylaxis with heparin, Eliquis on hold  GI prophylaxis with Protonix      The patient was discussed with Dr. Carrington, and she is in agreement with the assessment and plan. My total face time with this patient was 25 minutes. Greater than half of  the visit was spent in counseling the patient on the above listed diagnoses and treatment options.     Kristy Corcoran PA-C  10/4/2024  8:23 AM    This note was initiated by Kristy Corcoran.  The PA saw the patient in conjunction with me. The PA performed a history, exam, and developed the assessment and plan in conjunction with me. I agree with the above note and have made changes which reflect my own history and physical, if necessary.    Patient reports continued abdominal pain  On physical exam, patient exquisitely tender to palpation in the right upper quadrant  INR 2.2 this morning  Recommend reversing INR  IR consultation for cholecystostomy tube in the setting of acute cholecystitis on HIDA scan and CT imaging  Patient can have clear liquids and advance as tolerated to regular diet once procedure is performed  General Surgery will follow  Rest of care per primary    Azucena Carrington MD  [facility] General Surgery  10/4/2024  12:19 PM

## 2024-10-04 NOTE — PROGRESS NOTES
[facility] [place]   part of [facility]     Hospitalist Progress Note     Dontae Buddy Cortez . Patient Status:  Inpatient    10/15/1970 MRN [id number]   formerly [facility] 4NW-A Attending Nixon Vergara MD   Hosp Day # 3 PCP Adrian Horowitz MD     Chief Complaint: abdominal pain    Subjective:     Patient seen with patient's wife at bedside.  Complains of right upper quadrant abdominal pain.  No nausea vomiting.      Objective:    Review of Systems:   A comprehensive review of systems was completed; pertinent positive and negatives stated in subjective.    Vital signs:  Temp:  [97.8 °F (36.6 °C)-98.6 °F (37 °C)] 98.2 °F (36.8 °C)  Pulse:  [63-77] 70  Resp:  [17-18] 17  BP: (109-132)/(64-79) 132/69  SpO2:  [90 %-97 %] 96 %    Physical Exam:    /69   Pulse 70   Temp 98.2 °F (36.8 °C)   Resp 17   Ht 6' 2\" (1.88 m)   Wt 172 lb 2.9 oz (78.1 kg)   SpO2 96%   BMI 22.11 kg/m²     General: No acute distress  Respiratory: No wheezes, no rhonchi  Cardiovascular: S1, S2, regular rate and rhythm  Abdomen: Soft, TTP, non-distended, positive bowel sounds  Neuro: No new focal deficits.   Extremities: No edema      Peripheral Pulses:  Radial: Right 1+ or Left 1+      Capillary Refill:  <3 Secs    Skin:  Temp/Moisture: Warm and Dry  Color: Normal            Diagnostic Data:    Labs:  Recent Labs   Lab 24  0637 10/01/24  0625 10/01/24  0811 10/02/24  0431 10/03/24  0522 10/03/24  0743   WBC 11.9* 13.2*  --  14.2* 9.3  --    HGB 9.9* 9.4*  --  8.7* 8.2*  --    MCV 93.7 90.6  --  92.0 94.1  --    .0 235.0  --  203.0 156.0  --    INR  --   --  4.19* 2.60*  --  2.02*       Recent Labs   Lab 10/01/24  0625 10/02/24  0430 10/03/24  0522   * 128* 89   BUN 21 33* 30*   CREATSERUM 1.41* 1.58* 0.74   CA 8.4* 8.5* 8.6*   ALB 2.9* 2.4* 2.3*    133* 133*   K 3.7  3.7 3.3*  3.3* 3.4*  3.4*    104 106   CO2 26.0 24.0 26.0   ALKPHO 159* 138* 140*   AST 21 27 63*   ALT 30 30 36   BILT 1.3* 1.1  1.0   TP 5.3* 5.0* 4.9*       Estimated Creatinine Clearance: 126.1 mL/min (based on SCr of 0.74 mg/dL).    No results for input(s): \"TROP\", \"TROPHS\", \"CK\" in the last 168 hours.    Recent Labs   Lab 10/01/24  0811 10/02/24  0431 10/03/24  0743   PTP 40.7* 28.1* 23.0*   INR 4.19* 2.60* 2.02*                  Microbiology    Hospital Encounter on 09/30/24   1. Blood Culture     Status: None (Preliminary result)    Collection Time: 10/01/24 12:19 PM    Specimen: Blood,peripheral   Result Value Ref Range    Blood Culture Result No Growth 2 Days N/A         Imaging: Reviewed in Epic.    Medications:    sodium ferric gluconate  125 mg Intravenous Daily    potassium chloride  40 mEq Oral Q4H    ceFAZolin  2 g Intravenous Q8H    baclofen  5 mg Oral BID    oxyCODONE  5 mg Oral Q8H    vancomycin  125 mg Oral Daily    [Held by provider] apixaban  5 mg Oral BID    OLANZapine  5 mg Oral Nightly    pantoprazole  40 mg Oral Before breakfast    pregabalin  25 mg Oral BID    sertraline  200 mg Oral Daily       Assessment & Plan:      # Klebsiella pneumonia sepsis on admission with transient septic shock on 10/1/2024  #Abdominal pain, possible acalculous cholecystitis  -ESR, CRP elevated on admission  -On IV antibiotics-was on IV Zosyn on 9/30/2024, then on IV Rocephin,  which was then changed to IV Ancef per ID based on sensitivity  - transferred to ICU by my colleague Dr. Arshad on 10/1/2024 for hypotension and was on pressor support with Levophed.  ABG with lactate done on 10/1/2024 at the time of transfer to ICU showed lactic acid of 1.1.  Patient received 500 cc of normal saline bolus at the time of transfer to ICU on 10/1/2024, did not receive full sepsis bolus as patient already hypoxic requiring 2 L of oxygen by nasal cannula at that time. Patient did not receive 30ml/kg of fluids despite having: hypotension. The reason for doing so is: a concern for fluid overload. 500mL of fluids were given instead   - blood pressure  improved on 10/2/2024 and weaned off of Levophed pressor support on a.m. of 10/2/2024.  Seen by palliative care -patient DNAR/selective treatment  US abdomen Doppler done on 10/1/2024 with normal zeinab in hepatic and portal veins.  Ultrasound abdomen done on 10/2/2024 shows trace perihepatic ascites and fluid demonstrating loculation and septations.  No significant fluid elsewhere within the abdomen.  GI and ID following.    HIDA scan positive -surgeon on consult for possible cholecystostomy tube, patient at this time wants to continue selective treatment including cholecystostomy tube if needed, seen by surgeon, IR consulted for cholecystostomy tube by surgeon and plans on cholecystostomy tube placement tomorrow by IR as reported by RN.     #Metastatic esophageal cancer with previous resection and gastroesophageostomy  # History of prior bronchoesophageal fistula  # pancreatic mets  #Liver and pulmonary mets  # Anemia secondary to chemotherapy and malignancy  - oncology following, has had outpatient chemotherapy     #H/o PE/DVT  Eliquis     #LE swelling, likely 3rd spacing, hypoalbuminemia     #Hypokalemia, replace PRN     #Cancer related pain, per palliative  ?celiac block for pain control was considered by GI during last admission according to patient  GI following in hospital     #HTN, controlled     #DL, statin    #PUD/GERD, PPi     #CAD with prev CABG    #Depression/anxiety, home meds resumed    #TREVOR, due to above, ATN, start gentle hydration/albumin, monitor    #Coagulopathy, due to liver mets, also on DOAC  -DOAC on hold, plans for IR cholecystostomy tube for cholecystitis in a.m. tomorrow    #Leukocytosis due to Klebsiella pneumoniae sepsis present on admission  -On IV antibiotics  -Follow CBC in a.m.     #Severe malnutrition-dietitian following     Discussed with patient, patient's wife at bedside.  Discussed with RN      Sakina Hernandez MD        Supplementary Documentation:     Quality:  DVT Mechanical  Prophylaxis:   SCDs, Early ambuation  DVT Pharmacologic Prophylaxis   Medication    heparin (Porcine) 100 Units/mL lock flush 500 Units    [Held by provider] apixaban (Eliquis) tab 5 mg                Code Status: DNAR/Selective Treatment  Neumann: No urinary catheter in place  Neumann Duration (in days):   Central line:    SHUBHAM:     Discharge is dependent on: progress  At this point Mr. Cortez is expected to be discharge to: home    The 21st Century Cures Act makes medical notes like these available to patients in the interest of transparency. Please be advised this is a medical document. Medical documents are intended to carry relevant information, facts as evident, and the clinical opinion of the practitioner. The medical note is intended as peer to peer communication and may appear blunt or direct. It is written in medical language and may contain abbreviations or verbiage that are unfamiliar.     Dietitian Malnutrition Assessment    Evaluation for Malnutrition: Criteria for severe malnutrition diagnosis- chronic illness related to   Wt loss greater than 5% in 1 month., Body fat severe depletion., Muscle mass severe depletion.               RD Malnutrition Care Plan: Adjusted diet to least restrictive to maximize intake., Encouraged increased PO intake., Encouraged small frequent meals with emphasis on high calorie/high protein.    Body Fat/Muscle Mass:          Physician Assessment     Patient has a diagnosis of severe malnutrition

## 2024-10-04 NOTE — PROGRESS NOTES
[facility] Progress Note   Length of Stay: 4    Subjective:   -Plan for cholecystostomy tube   -Breather better after nebulizer treatment   -Still with some abdominal tenderness     Oncology History   -2018: Per report had a normal EGD and colonoscopy     -June 2022: He met with GI due to new onset dysphagia which started about 1 month prior to his visit.  He had an esophagram with a focal abrupt narrowing with irregular margins in the distal one third of the esophagus extending to the GE junction.  He also had an episode of food impaction. He has also lost about 15 lb. He was a heavy smoker from age 14 to about 40 (1.5 PPD). Also with alcohol use currently. Mother with a history of breast and colon cancer.  EGD and EUS with Dr. Yadav on 6/7/2022: Severe esophagitis with a concerning mass extending 37-39 centimeters from the incisors.  Nonobstructive mass.  By EUS uT3N1 disease.  Pathology showed invasive moderate to poorly differentiated adenocarcinoma.  HER2 amplified by FISH. CT CAP done at [facility] on 6/16/22: Results not loaded to PACs yet.  CA 19-9 from the same day was 107.4.  CEA normal. PET/CT on 6/20/2022: Increased distal esophageal uptake with an SUV of 14.4.  Concern for shreya metastases.     -Concurrent chemoradiation with carboplatin AUC2 plus paclitaxel 50 mg/m2: July 2022-August 2022 with  down (280-->30). Post treatment PET/CT showed improvement.      -Surgery with Dr. Lu on 9/30/22: ypT1b pN0. Recovery has been complicated by an esophageal leak,      -I met with him on 12/12/22. We discussed adjuvant opdivo for 1 year. His  was elevated to 48 and repeat was 64. CT CAP was recommended.      -He was admitted on 12/25/22 for abdominal pain. He had a POEM procedure done. He was also noted to have a RLL consolidation. He was seen by pulmonary, based on imaging an outpatient PET/CT was recommended and a biopsy of the most FDG avid lesion would be  considered. Noted to have CAD and an outpatient evaluation was recommended. Outpatient PET/CT done on 1/4/23 was reviewed in tumor board and there was no focal area of concern and adjuvant immunotherapy recommended.      -Adjuvant opdivo:01/2023-03/2023. PET/CT in in 04/2023 showed uptake in the pancreatic head and tail. Also with some uptake in liver. EGD/EUS on 4/27/23 with Dr. Ritchie: Pancreatic head mass positive for adenocarcinoma: Comparison to previous esophageal cancer shows similarity but IHC in non-specific. Her 2 IHC was 2+ on this specimen but FISH was negative.  Given discordant results on initial HER2 and follow-up HER2 we decided to proceed with chemotherapy with immunotherapy with HER2 directed therapy.     -Chemo + Herceptin + Immunotherapy: He received 7 cycles of FOLFOX + Herceptin + Immunotherapy (05/2023-09/2023). Imaging after C5 showed a favorable response. After C7, we held oxaliplatin due to neuropathy and he was started on maintenance herceptin + IO maintenance. Signatera was negative 09/11/23.     -Herceptin + IO Maintenance: He received 3 cycles from 09/25/23-10/31/23. Treatment was delayed due to hospitalizations.      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 Due to new findings-restarting FOLFOX discussed.      -admitted from 1/8/24-1/13/24 for persistent dysphagia and bronchesophageal stent.      -1/17/24: EGD with fistula closure with ASD occluder and removal of the bronchial stent.     -FOLFOX + Herceptin + Opdivo restarted: 3 cycles: 02/2024-03/2024     -Treatment delayed again for recurrent hospital admissions     -He underwent a Latissimus dorsi flap  reconstruction for his bronchoesophageal fistula on 5/1/24     -PET/CT 5/15/24: at least 3 areas of uptake in the right hepatic lobe, uptake in pancrease and retroperitoneum. Post-operative uptake in chest wall.      -FOLFOX + Herceptin + Nivolumab Restarted: 6/11/2024 until 8/14/2024.  He received 5 cycles.  A PET scan after 5 cycles showed overall progression of metastatic disease with new gastrohepatic and peripancreatic lymph nodes along with enlargement of pancreatic and hepatic metastases.  There were also new lung nodules.  A CT-guided liver biopsy on 9/10/2024 was done which was positive for metastatic adenocarcinoma and the immunoprofile is compatible with the patient's esophageal primary. HER2 negative on FISH.     -He was admitted on 9/15/24 for abdominal pain. Labs showed elevated AST/ALT and T bili (6.7). CTA CAP showed progression of pancreatic and hepatic masses. There is intra-extra hepatic biliary ductal dilation. Subpleural reticular opacities noted-inflammatory or interstitial process? Hi Res CT recommended. He underwent an ERCP which showed a CBD stricture with biliary sphincterectomy and CBD, PD bile duct stent placement with Dr. Ritchie.     -Weekly paclitaxel 80 mg/m2: 3 weeks on 1 week off   -C1D1: 9/23/24:  42,110    ROS: 12 Point ROS completed and pertinent positives are above     Objective:    metRONIDAZOLE  500 mg Intravenous Q12H    sodium ferric gluconate  125 mg Intravenous Daily    ceFAZolin  2 g Intravenous Q8H    baclofen  5 mg Oral BID    oxyCODONE  5 mg Oral Q8H    vancomycin  125 mg Oral Daily    [Held by provider] apixaban  5 mg Oral BID    OLANZapine  5 mg Oral Nightly    pantoprazole  40 mg Oral Before breakfast    pregabalin  25 mg Oral BID    sertraline  200 mg Oral Daily       ipratropium-albuterol    heparin    acetaminophen    melatonin    glycerin-hypromellose-    sodium chloride    ondansetron    prochlorperazine    HYDROmorphone **OR** HYDROmorphone  **OR** HYDROmorphone    Physical Exam  Vitals:    10/04/24 0435   BP: 141/79   Pulse: 72   Resp: 16   Temp: 98 °F (36.7 °C)     General: NAD, AOX3  HEENT: clear op, mmm, no jvd, no scleral icterus   CV: RRR S1S2 no murmurs, no edema  Lungs: CTAB, no increased work of breathing  Abd: soft nt nd +BS no hepatosplenomegaly  Neuro: CN: II-XII grossly intact    Labs/Imaging/Path:  Lab Results   Component Value Date    WBC 9.3 10/03/2024    HGB 8.2 (L) 10/03/2024    HCT 25.6 (L) 10/03/2024    MCV 94.1 10/03/2024    .0 10/03/2024       Recent Labs   Lab 10/01/24  0625 10/02/24  0430 10/03/24  0522   * 128* 89   BUN 21 33* 30*   CREATSERUM 1.41* 1.58* 0.74   CA 8.4* 8.5* 8.6*   ALB 2.9* 2.4* 2.3*    133* 133*   K 3.7  3.7 3.3*  3.3* 3.4*  3.4*    104 106   CO2 26.0 24.0 26.0   ALKPHO 159* 138* 140*   AST 21 27 63*   ALT 30 30 36   BILT 1.3* 1.1 1.0   TP 5.3* 5.0* 4.9*       Imaging: Reviewed   CTA CAP on 9/30/24  There has been interval CBD stent placement.      Additionally, in the interim from the previous exam of 9/15/2024, there has been development of diffuse patchy ground-glass opacities within the lungs bilaterally, mild to moderate diffuse intra-abdominal ascites with mesenteric and omental stranding   diffusely as well as the development of subcutaneous edematous changes/stranding involving the entire visualized subcutaneous tissues of the chest, abdomen, pelvis and upper thighs.  Additionally, there has been interval increased gallbladder distension   with mild gallbladder wall thickening and diffuse pericholecystic fluid and fat stranding.  There is also interval development of colonic wall thickening involving the transverse to rectosigmoid colon.      Given the multiple interval changes, the appearance would suggest an acute, diffuse inflammatory process.      The previously identified metastatic lesions of the liver and pancreas appear relatively stable.      No pulmonary emboli  noted.     Assessment and Plan:   Klebsiella pneumoniae Bacteremia: ID following. Possibly from recent CBD stent. On ceftriaxone. Ok to remove port if needed as he is on weekly taxol for his chemotherapy    Trembling/Hallucinations: Appears to have resolved.     Ascites: repeat US with trace ascites so para canceled.     Metastatic esophageal cancer  -Most recent imaging is consistent with metastatic disease progression.  Repeat HER2 is negative on FISH. We discussed that he will not be a good candidate for Cyramza due to history of esophageal fistula and PE/DVT. He is on weekly paclitaxel and received cycle 1 day 1 on 9/23/24. Treatment on hold.      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 CTA from 09/2024 shows resolution. Ok to hold for procedures. Please restart Eliquis once procedures are done.     Coagulopathy: Note that INR elevation is likely from Eliquis and nutritional deficit. Eliquis has been on hold > 2 days. Will given one dose of vitamin K. Ok to proceed with Cholecystomy tube.      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 He is s/p ERCP on 9/17/24 with CBD, PD stent placement.      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 Now s/p repair 5/1/24.     Appreciate palliative care consultation     [facility]

## 2024-10-04 NOTE — WOUND PROGRESS NOTE
[facility]  Inpatient Wound Care Contact Note    Dontae Buddy Ortegacristina Arechiga. Patient Status:  Inpatient    10/15/1970 MRN [id number]   Location [facility] INTERVENTIONAL SUITES Attending Sakina Hernandez MD   Hosp Day # 4 PCP Adrian Horowitz MD     Attempted to see patient for follow up wound care session.  Patient is currently in a procedure per RN.RN states she applied bordered foam on the affected site.Will continue this dressing until seen by wound care.Change dressing daily and prn.    We will continue to follow this patient while in-house and assist with wound care discharge planning.    Please call me at [phone]  if you have any questions about this consultation and plan of care.       Thank you,    Sherri Schulz RN BSN [facility]  [address] [address]

## 2024-10-04 NOTE — PROCEDURES
[facility]   part of [facility]  Procedure Note    Dontae Buddy Cortez . Patient Status:  Inpatient    10/15/1970 MRN [id number]   Location [facility] INTERVENTIONAL SUITES Attending Sakina Hernandez MD   Hosp Day # 4 PCP Adrian Horowitz MD     Procedure: Percutaneous cholecystostomy tube placement    Pre-Procedure Diagnosis:  Acute sofy    Post-Procedure Diagnosis: Same    Anesthesia:  Local and Sedation    Findings:  Successful placement of 8.5fr  perc sofy via transhepatic approach.  Dialted GB with wall thickening and pericholecystic fluid. Perihepatic loculated ascites noted.    Drain must stay in place for at least 4-6 weeks, if cholecystectomy not performed.    Specimens: Bile    Blood Loss:  <5ml    Tourniquet Time: 0  Complications:  None  Drains:  8.5fr perc sofy    Secondary Diagnosis:  none    Mendez Myles MD  10/4/2024

## 2024-10-04 NOTE — PAYOR COMM NOTE
CONTINUED STAY REVIEW    Payor: [employer]  Subscriber #:  [id number]  Authorization Number: [id number]    Admit date: 24  Admit time: 10:48 AM    REVIEW DOCUMENTATION: 10/3-10/4      10/3/2024 Hospitalist Progress Note  Sakina Hernandez MD  Physician  Hospitalist     Progress Notes     Signed     Date of Service: 10/3/2024  3:59 PM     Signed       [facility]   part of [facility]     Hospitalist Progress Note            Dontae Ortegacristina Champion Patient Status:  Inpatient    10/15/1970 MRN [id number]   Location [facility] 4NW-A Attending Nixon Vergara MD   Hosp Day # 3 PCP Adrian Horowitz MD      Chief Complaint: abdominal pain        Subjective:  Patient seen with patient's wife at bedside.  Complains of right upper quadrant abdominal pain.  No nausea vomiting.             Objective:  Review of Systems:   A comprehensive review of systems was completed; pertinent positive and negatives stated in subjective.     Vital signs:  Temp:  [97.8 °F (36.6 °C)-98.6 °F (37 °C)] 98.2 °F (36.8 °C)  Pulse:  [63-77] 70  Resp:  [17-18] 17  BP: (109-132)/(64-79) 132/69  SpO2:  [90 %-97 %] 96 %     Physical Exam:    /69   Pulse 70   Temp 98.2 °F (36.8 °C)   Resp 17   Ht 6' 2\" (1.88 m)   Wt 172 lb 2.9 oz (78.1 kg)   SpO2 96%   BMI 22.11 kg/m²      General: No acute distress  Respiratory: No wheezes, no rhonchi  Cardiovascular: S1, S2, regular rate and rhythm  Abdomen: Soft, TTP, non-distended, positive bowel sounds  Neuro: No new focal deficits.   Extremities: No edema        Peripheral Pulses:  Radial: Right 1+ or Left 1+        Capillary Refill:  <3 Secs     Skin:  Temp/Moisture: Warm and Dry  Color: Normal           Diagnostic Data:    Labs:           Recent Labs   Lab 24  0637 10/01/24  0625 10/01/24  0811 10/02/24  0431 10/03/24  0522 10/03/24  0743   WBC 11.9* 13.2*  --  14.2* 9.3  --    HGB 9.9* 9.4*  --  8.7* 8.2*  --    MCV 93.7 90.6  --  92.0  94.1  --    .0 235.0  --  203.0 156.0  --    INR  --   --  4.19* 2.60*  --  2.02*               Recent Labs   Lab 10/01/24  0625 10/02/24  0430 10/03/24  0522   * 128* 89   BUN 21 33* 30*   CREATSERUM 1.41* 1.58* 0.74   CA 8.4* 8.5* 8.6*   ALB 2.9* 2.4* 2.3*    133* 133*   K 3.7  3.7 3.3*  3.3* 3.4*  3.4*    104 106   CO2 26.0 24.0 26.0   ALKPHO 159* 138* 140*   AST 21 27 63*   ALT 30 30 36   BILT 1.3* 1.1 1.0   TP 5.3* 5.0* 4.9*         Estimated Creatinine Clearance: 126.1 mL/min (based on SCr of 0.74 mg/dL).     No results for input(s): \"TROP\", \"TROPHS\", \"CK\" in the last 168 hours.           Recent Labs   Lab 10/01/24  0811 10/02/24  0431 10/03/24  0743   PTP 40.7* 28.1* 23.0*   INR 4.19* 2.60* 2.02*         Microbiology           Hospital Encounter on 09/30/24   1. Blood Culture     Status: None (Preliminary result)     Collection Time: 10/01/24 12:19 PM     Specimen: Blood,peripheral   Result Value Ref Range     Blood Culture Result No Growth 2 Days N/A            Imaging: Reviewed in Epic.     Medications:   Scheduled Medications    sodium ferric gluconate  125 mg Intravenous Daily    potassium chloride  40 mEq Oral Q4H    ceFAZolin  2 g Intravenous Q8H    baclofen  5 mg Oral BID    oxyCODONE  5 mg Oral Q8H    vancomycin  125 mg Oral Daily    [Held by provider] apixaban  5 mg Oral BID    OLANZapine  5 mg Oral Nightly    pantoprazole  40 mg Oral Before breakfast    pregabalin  25 mg Oral BID    sertraline  200 mg Oral Daily                  Assessment & Plan:  # Klebsiella pneumonia sepsis on admission with transient septic shock on 10/1/2024  #Abdominal pain, possible acalculous cholecystitis  -ESR, CRP elevated on admission  -On IV antibiotics-was on IV Zosyn on 9/30/2024, then on IV Rocephin,  which was then changed to IV Ancef per ID based on sensitivity  - transferred to ICU by my colleague Dr. Arshad on 10/1/2024 for hypotension and was on pressor support with Levophed.  ABG  with lactate done on 10/1/2024 at the time of transfer to ICU showed lactic acid of 1.1.  Patient received 500 cc of normal saline bolus at the time of transfer to ICU on 10/1/2024, did not receive full sepsis bolus as patient already hypoxic requiring 2 L of oxygen by nasal cannula at that time. Patient did not receive 30ml/kg of fluids despite having: hypotension. The reason for doing so is: a concern for fluid overload. 500mL of fluids were given instead   - blood pressure improved on 10/2/2024 and weaned off of Levophed pressor support on a.m. of 10/2/2024.  Seen by palliative care -patient DNAR/selective treatment  US abdomen Doppler done on 10/1/2024 with normal zeinab in hepatic and portal veins.  Ultrasound abdomen done on 10/2/2024 shows trace perihepatic ascites and fluid demonstrating loculation and septations.  No significant fluid elsewhere within the abdomen.  GI and ID following.    HIDA scan positive -surgeon on consult for possible cholecystostomy tube, patient at this time wants to continue selective treatment including cholecystostomy tube if needed, seen by surgeon, IR consulted for cholecystostomy tube by surgeon and plans on cholecystostomy tube placement tomorrow by IR as reported by RN.     #Metastatic esophageal cancer with previous resection and gastroesophageostomy  # History of prior bronchoesophageal fistula  # pancreatic mets  #Liver and pulmonary mets  # Anemia secondary to chemotherapy and malignancy  - oncology following, has had outpatient chemotherapy     #H/o PE/DVT  Eliquis     #LE swelling, likely 3rd spacing, hypoalbuminemia     #Hypokalemia, replace PRN     #Cancer related pain, per palliative  ?celiac block for pain control was considered by GI during last admission according to patient  GI following in hospital     #HTN, controlled     #DL, statin    #PUD/GERD, PPi     #CAD with prev CABG    #Depression/anxiety, home meds resumed     #TREVOR, due to above, ATN, start gentle  hydration/albumin, monitor     #Coagulopathy, due to liver mets, also on DOAC  -DOAC on hold, plans for IR cholecystostomy tube for cholecystitis in a.m. tomorrow    #Leukocytosis due to Klebsiella pneumoniae sepsis present on admission  -On IV antibiotics  -Follow CBC in a.m.     #Severe malnutrition-dietitian following     Discussed with patient, patient's wife at bedside.  Discussed with DANY Hernandez MD                           10/4/2024 Hospitalist Progress Note    Hospitalist Progress Note            Dontae Buddy Ortegacristina Champion Patient Status:  Inpatient    10/15/1970 MRN [id number]   Formerly [facility] 4NW-A Attending Nixon Vergara MD   Hosp Day # 4 PCP Adrian Horowitz MD      Chief Complaint: abdominal pain        Subjective:  Patient seen with patient's wife at bedside.  Complains of right upper quadrant abdominal pain persisting up to 10 out of 10 with palpation, await cholecystostomy tube, INR still elevated, hematology planning vitamin K.  No nausea vomiting.             Objective:  Review of Systems:   A comprehensive review of systems was completed; pertinent positive and negatives stated in subjective.     Vital signs:  Temp:  [97.6 °F (36.4 °C)-98.2 °F (36.8 °C)] 97.6 °F (36.4 °C)  Pulse:  [70-91] 72  Resp:  [14-16] 16  BP: (132-147)/(69-80) 134/80  SpO2:  [90 %-98 %] 98 %     Physical Exam:    /80 (BP Location: Right arm)   Pulse 72   Temp 97.6 °F (36.4 °C) (Oral)   Resp 16   Ht 6' 2\" (1.88 m)   Wt 172 lb 2.9 oz (78.1 kg)   SpO2 98%   BMI 22.11 kg/m²      General: No acute distress  Respiratory: No wheezes, no rhonchi  Cardiovascular: S1, S2, regular rate and rhythm  Abdomen: Soft, TTP, non-distended, positive bowel sounds  Neuro: No new focal deficits.   Extremities: No edema        Peripheral Pulses:  Radial: Right 1+ or Left 1+        Capillary Refill:  <3 Secs     Skin:  Temp/Moisture: Warm and Dry  Color: Normal                 Diagnostic Data:    Labs:             Recent Labs   Lab 09/30/24 0637 10/01/24  0625 10/01/24  0811 10/02/24  0431 10/03/24  0522 10/03/24  0743 10/04/24  0630   WBC 11.9* 13.2*  --  14.2* 9.3  --  10.2   HGB 9.9* 9.4*  --  8.7* 8.2*  --  8.3*   MCV 93.7 90.6  --  92.0 94.1  --  90.7   .0 235.0  --  203.0 156.0  --  171.0   INR  --   --  4.19* 2.60*  --  2.02* 2.20*               Recent Labs   Lab 10/02/24  0430 10/03/24  0522 10/04/24  0630   * 89 109*   BUN 33* 30* 21   CREATSERUM 1.58* 0.74 0.49*   CA 8.5* 8.6* 8.2*   ALB 2.4* 2.3* 2.6*   * 133* 136   K 3.3*  3.3* 3.4*  3.4* 4.0  4.0    106 105   CO2 24.0 26.0 27.0   ALKPHO 138* 140* 176*   AST 27 63* 50*   ALT 30 36 27   BILT 1.1 1.0 1.0   TP 5.0* 4.9* 5.0*         Estimated Creatinine Clearance: 190.4 mL/min (A) (based on SCr of 0.49 mg/dL (L)).     No results for input(s): \"TROP\", \"TROPHS\", \"CK\" in the last 168 hours.           Recent Labs   Lab 10/02/24  0431 10/03/24  0743 10/04/24  0630   PTP 28.1* 23.0* 24.6*   INR 2.60* 2.02* 2.20*                     Microbiology           Hospital Encounter on 09/30/24   1. Blood Culture     Status: None (Preliminary result)     Collection Time: 10/01/24 12:19 PM     Specimen: Blood,peripheral   Result Value Ref Range     Blood Culture Result No Growth 2 Days N/A            Imaging: Reviewed in Epic.     Medications:   Scheduled Medications    metRONIDAZOLE  500 mg Intravenous Q12H    phytonadione (Aqua-Mephton) 2 mg in sodium chloride 0.9% 50 mL IVPB  2 mg Intravenous Once    sodium ferric gluconate  125 mg Intravenous Daily    ceFAZolin  2 g Intravenous Q8H    baclofen  5 mg Oral BID    oxyCODONE  5 mg Oral Q8H    vancomycin  125 mg Oral Daily    [Held by provider] apixaban  5 mg Oral BID    OLANZapine  5 mg Oral Nightly    pantoprazole  40 mg Oral Before breakfast    pregabalin  25 mg Oral BID    sertraline  200 mg Oral Daily                  Assessment & Plan:  # Klebsiella pneumonia sepsis on admission with transient  septic shock on 10/1/2024  #Abdominal pain, possible acalculous cholecystitis  -ESR, CRP elevated on admission  -On IV antibiotics-was on IV Zosyn on 9/30/2024, then on IV Rocephin,  which was then changed to IV Ancef per ID based on sensitivity  - transferred to ICU by my colleague Dr. Arshad on 10/1/2024 for hypotension and was on pressor support with Levophed.  ABG with lactate done on 10/1/2024 at the time of transfer to ICU showed lactic acid of 1.1.  Patient received 500 cc of normal saline bolus at the time of transfer to ICU on 10/1/2024, did not receive full sepsis bolus as patient already hypoxic requiring 2 L of oxygen by nasal cannula at that time. Patient did not receive 30ml/kg of fluids despite having: hypotension. The reason for doing so is: a concern for fluid overload. 500mL of fluids were given instead   - blood pressure improved on 10/2/2024 and weaned off of Levophed pressor support on a.m. of 10/2/2024.  Seen by palliative care -patient DNAR/selective treatment  US abdomen Doppler done on 10/1/2024 with normal zeinab in hepatic and portal veins.  Ultrasound abdomen done on 10/2/2024 shows trace perihepatic ascites and fluid demonstrating loculation and septations.  No significant fluid elsewhere within the abdomen.  GI and ID following.    HIDA scan positive -surgeon on consult for possible cholecystostomy tube, patient at this time wants to continue selective treatment including cholecystostomy tube if needed, seen by surgeon, IR consulted for cholecystostomy tube by surgeon and plans on cholecystostomy tube placement  by IR plan when INR less than or equal to 1.5     #Metastatic esophageal cancer with previous resection and gastroesophageostomy  # History of prior bronchoesophageal fistula  # pancreatic mets  #Liver and pulmonary mets  # Anemia secondary to chemotherapy and malignancy  - oncology following, has had outpatient chemotherapy     #H/o PE/DVT  Eliquis     #LE swelling, likely 3rd  spacing, hypoalbuminemia     #Hypokalemia, replace PRN     #Cancer related pain, per palliative  ?celiac block for pain control was considered by GI during last admission according to patient  GI following in hospital     #HTN, controlled     #DL, statin    #PUD/GERD, PPi     #CAD with prev CABG    #Depression/anxiety, home meds resumed     #TREVOR, due to above, ATN, start gentle hydration/albumin, monitor     #Coagulopathy, due to liver mets, was also on DOAC  -DOAC on hold for more than 72 hours, plans for IR cholecystostomy tube for cholecystitis today, INR still elevated at 2.20 today, vitamin K 1 dose being given today per hematologist.  Repeat INR after vitamin K in 1 hour    #Leukocytosis due to Klebsiella pneumoniae sepsis present on admission  -On IV antibiotics  -Follow CBC in a.m.     #Severe malnutrition-dietitian following     Discussed with patient, patient's wife at bedside.  Discussed with DANY Hernandez MD                 Supplementary Documentation:  Quality:  DVT Mechanical Prophylaxis:   SCDs, Early ambuation      DVT Pharmacologic Prophylaxis   Medication    heparin (Porcine) 100 Units/mL lock flush 500 Units    [Held by provider] apixaban (Eliquis) tab 5 mg                    MEDICATIONS ADMINISTERED IN LAST 1 DAY:  baclofen (Lioresal) tab 5 mg       Date Action Dose Route User    10/4/2024 0920 Given 5 mg Oral Verona Hernandez RN    10/3/2024 2042 Given 5 mg Oral BreEstrellita haskins RN          ceFAZolin (Ancef) 2g in 10mL IV syringe premix       Date Action Dose Route User    10/4/2024 1134 New Bag 2 g Intravenous Verona Hernandez RN    10/4/2024 0531 New Bag 2 g Intravenous Estrellita Brown RN    10/3/2024 2042 New Bag 2 g Intravenous Estrellita Brown RN          HYDROmorphone (Dilaudid) 1 MG/ML injection 0.4 mg       Date Action Dose Route User    10/4/2024 0531 Given 0.4 mg Intravenous Estrellita Brown RN    10/4/2024 0312 Given 0.4 mg Intravenous Estrellita Brown RN    10/4/2024 0025 Given 0.4 mg Intravenous  Estrellita Brown RN    10/3/2024 2119 Given 0.4 mg Intravenous Estrellita Brown RN    10/3/2024 1747 Given 0.4 mg Intravenous Niurka Campos RN          HYDROmorphone (Dilaudid) 1 MG/ML injection 0.8 mg       Date Action Dose Route User    10/4/2024 1133 Given 0.8 mg Intravenous Verona Hernandez RN    10/4/2024 0920 Given 0.8 mg Intravenous Verona Hernandez RN          iohexol (OMNIPAQUE) 350 MG/ML injection 25 mL       Date Action Dose Route User    10/4/2024 1431 Given 7 mL Injection Mendez Myles MD          ipratropium-albuterol (Duoneb) 0.5-2.5 (3) MG/3ML inhalation solution 3 mL       Date Action Dose Route User    10/3/2024 2201 Given 3 mL Nebulization Lesa Benavides RCP          metRONIDAZOLE in sodium chloride 0.79% (Flagyl) 5 mg/mL IVPB premix 500 mg       Date Action Dose Route User    10/4/2024 0312 New Bag 500 mg Intravenous Estrellita Brown RN          OLANZapine (ZyPREXA) tab 5 mg       Date Action Dose Route User    10/3/2024 2042 Given 5 mg Oral Estrellita Brown RN          oxyCODONE immediate release tab 5 mg       Date Action Dose Route User    10/4/2024 0531 Given 5 mg Oral Estrellita Brown RN    10/3/2024 2042 Given 5 mg Oral Estrellita Brown RN          pantoprazole (Protonix) DR tab 40 mg       Date Action Dose Route User    10/4/2024 0531 Given 40 mg Oral BreEstrellita haskins RN          phytonadione (Aqua-Mephton) 2 mg in sodium chloride 0.9% 50 mL IVPB       Date Action Dose Route User    10/4/2024 1134 New Bag 2 mg Intravenous Verona Hernandez RN          potassium chloride (Klor-Con M20) tab 40 mEq       Date Action Dose Route User    10/3/2024 2042 Given 40 mEq Oral Estrellita Brown RN    10/3/2024 1746 Given 40 mEq Oral Niurka Campos RN          pregabalin (Lyrica) cap 25 mg       Date Action Dose Route User    10/4/2024 0920 Given 25 mg Oral Verona Hernandez RN    10/3/2024 2042 Given 25 mg Oral Estrellita Brown RN          sertraline (Zoloft) tab 200 mg       Date Action Dose Route User    10/4/2024 0920 Given 200 mg  Oral Verona Hernandez RN          sodium ferric gluconate (Ferrlecit) 125 mg in sodium chloride 0.9% 100mL IVPB premix       Date Action Dose Route User    10/4/2024 0920 New Bag 125 mg Intravenous Verona Hernandez RN          vancomycin (Vancocin) cap 125 mg       Date Action Dose Route User    10/4/2024 0920 Given 125 mg Oral Verona Hernandez RN            Vitals (last day)       Date/Time Temp Pulse Resp BP SpO2 Weight O2 Device O2 Flow Rate (L/min) [facility]    10/04/24 1459 -- -- -- -- -- -- -- 3 L/min AP    10/04/24 1459 97.6 °F (36.4 °C) 87 -- 128/73 97 % -- Nasal cannula -- ES    10/04/24 1449 -- -- -- -- -- -- -- 3 L/min AP    10/04/24 1449 97.6 °F (36.4 °C) 82 16 133/69 95 % -- Nasal cannula -- ES    10/04/24 1130 97.8 °F (36.6 °C) 69 -- 145/80 94 % -- -- -- ES    10/04/24 0715 97.6 °F (36.4 °C) 72 -- 134/80 98 % -- -- -- ES    10/04/24 0435 98 °F (36.7 °C) 72 16 141/79 98 % -- Nasal cannula -- CP    10/03/24 2201 -- -- -- -- -- -- -- 4 L/min CP    10/03/24 2201 -- -- -- -- 94 % -- Nasal cannula -- DD    10/03/24 2119 -- 88 -- -- 91 % -- -- -- CP    10/03/24 2024 98.2 °F (36.8 °C) 91 14 147/80 90 % -- Nasal cannula -- CP    10/03/24 1150 98.2 °F (36.8 °C) 70 -- 132/69 96 % -- Nasal cannula 1 L/min ES    10/03/24 0721 -- -- -- -- 94 % -- Nasal cannula 1 L/min AF    10/03/24 0721 97.8 °F (36.6 °C) 70 -- 120/66 -- -- -- -- ES    10/03/24 0405 97.8 °F (36.6 °C) 63 17 109/64 90 % -- Nasal cannula 1 L/min KL          CIWA Scores (since admission)       None

## 2024-10-04 NOTE — PROGRESS NOTES
INFECTIOUS DISEASE  PROGRESS NOTE            [facility]    Dontae Buddy Diego Champion Patient Status:  Inpatient    10/15/1970 MRN [id number]   [facility] 4SW-A Attending Sakina Hernandez MD   Hosp Day # 4 PCP Adrian Horowitz MD     Antibiotics: #4  Cefazolin #3  Metronidazole #1    Subjective:  comfortable    Objective:  Temp:  [97.6 °F (36.4 °C)-98.2 °F (36.8 °C)] 97.8 °F (36.6 °C)  Pulse:  [69-91] 69  Resp:  [14-16] 16  BP: (134-147)/(79-80) 145/80  SpO2:  [90 %-98 %] 94 %    General: awake alert  Vital signs:Temp:  [97.6 °F (36.4 °C)-98.2 °F (36.8 °C)] 97.8 °F (36.6 °C)  Pulse:  [69-91] 69  Resp:  [14-16] 16  BP: (134-147)/(79-80) 145/80  SpO2:  [90 %-98 %] 94 %  HEENT: Moist mucous membranes. Extraocular muscles are intact.  Neck: supple no masses  Respiratory: Non labored, symmetric excursion, normal respirations  Port intact  Cardiovascular: no irregularities in rhythm  Abdomen, non distended, was tender RUQ on yesterdays exam, deferred today  Musculoskeletal: joints: no swelling   Integument: No lesions. No erythema. No open wounds.  Labs:     COVID-19 Lab Results    COVID-19  Lab Results   Component Value Date    COVID19 Not Detected 2024    COVID19 Not Detected 2024    COVID19 Not Detected 2024       Pro-Calcitonin  Recent Labs   Lab 10/01/24  0625   PCT 3.06*       Cardiac  No results for input(s): \"TROP\", \"PBNP\" in the last 168 hours.    Creatinine Kinase  No results for input(s): \"CK\" in the last 168 hours.    Inflammatory Markers  Recent Labs   Lab 24  0637 10/02/24  1035   CRP 22.40*  --    MYAH  --  791*       Recent Labs   Lab 10/04/24  0630   RBC 2.80*   HGB 8.3*   HCT 25.4*   MCV 90.7   MCH 29.6   MCHC 32.7   RDW 14.6   NEPRELIM 8.46*   WBC 10.2   .0         Recent Labs   Lab 10/02/24  0430 10/03/24  0522 10/04/24  0630   * 89 109*   BUN 33* 30* 21   CREATSERUM 1.58* 0.74 0.49*   CA 8.5* 8.6* 8.2*   ALB 2.4* 2.3* 2.6*   * 133* 136   K  3.3*  3.3* 3.4*  3.4* 4.0  4.0    106 105   CO2 24.0 26.0 27.0   ALKPHO 138* 140* 176*   AST 27 63* 50*   ALT 30 36 27   BILT 1.1 1.0 1.0   TP 5.0* 4.9* 5.0*       No results found for: \"VANCT\"  Microbiology    Hospital Encounter on 09/30/24   1. Blood Culture     Status: None (Preliminary result)    Collection Time: 10/01/24 12:19 PM    Specimen: Blood,peripheral   Result Value Ref Range    Blood Culture Result No Growth 2 Days N/A         Problem list reviewed:  Patient Active Problem List   Diagnosis    Primary hypertension    Hyperlipidemia with target low density lipoprotein (LDL) cholesterol less than 70 mg/dL    Coronary artery disease involving coronary bypass graft of native heart with angina pectoris (HCC)    S/P CABG x 4    Nontoxic multinodular goiter    Pulmonary nodules    Thrombophlebitis leg    BRANDAN (generalized anxiety disorder)    Right shoulder Arthroscopy acromioplasty ,distal  clavicle resection, rotator cuff/labral debridment  Global 05/13/2021    Right bicipital tenosynovitis    Malignant neoplasm of esophagus (HCC)    Pleural effusion    Esophageal anastomotic leak    Esophageal obstruction    On total parenteral nutrition (TPN)    Mechanical complication of esophagostomy (HCC)    Abdominal pain of unknown etiology    Migration of esophageal stent    Gastroparesis    Esophageal carcinoma (HCC)    Normocytic anemia    Esophageal fistula    Subacute cough    Acquired bronchoesophageal fistula (HCC)    Pneumonia of both lower lobes due to infectious organism    Malignant neoplasm of pancreas (HCC)    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 difficile colitis    Dysphagia, unspecified  type    Dyspnea and respiratory abnormalities    Hypokalemia    Dysphagia    Narcotic drug use    History of esophageal cancer    Palliative care by specialist    Neoplasm related pain    Endobronchial mass    Hyponatremia    Thrombocytopenia (HCC)    Acute kidney injury (HCC)    Hyperglycemia    Aspiration pneumonitis (HCC)    C. difficile diarrhea    Aspiration pneumonia (HCC)    Malignant neoplasm metastatic to liver (HCC)    Hyperlipidemia    Nausea vomiting and diarrhea    Fistula, bronchoesophageal (HCC)    Iron deficiency anemia, unspecified iron deficiency anemia type    Azotemia    Palliative care encounter    Goals of care, counseling/discussion    Cancer related pain    Anemia    Fistula    Acute cough    Pneumonia of right lung due to infectious organism, unspecified part of lung    Bacteremia    Acute postoperative pain    Exocrine pancreatic insufficiency (HCC)    Hypertriglyceridemia    Acute pulmonary embolism without acute cor pulmonale, unspecified pulmonary embolism type (HCC)    Acute deep vein thrombosis (DVT) of popliteal vein of right lower extremity (HCC)    Altered mental status, unspecified altered mental status type    Transaminitis    Biliary obstruction (HCC)    Hyperbilirubinemia    Esophageal adenocarcinoma (HCC)    Cancer associated pain    Abdominal pain, acute    Abnormal CT of the chest    Lower extremity edema    Abnormal finding of diagnostic imaging    Metastatic adenocarcinoma to esophagus (HCC)    Klebsiella pneumoniae sepsis (HCC)    Septic shock (HCC)    Acalculous cholecystitis    Severe malnutrition (HCC)    Metastatic adenocarcinoma (HCC)    Acute cholecystitis             ASSESSMENT/PLAN:  1. Klebsiella pneumoniae bacteremia in association with acute cholecystitis    -underlying metastatic esophageal cancer on chemo, port in place  -recent PD stent on 9/17 for malignant obstruction  Admitted 9/30 abdominal pain  Increasing RUQ tenderness  CT showing gallbladder  distention, HIDA no filling   Surgery and GI following    Repeat blood cultures (after antibiotics started) no growth     Continue Cefazolin and metronidazole  IR cholecystostomy tube planned  PO Levaquin, fagyl once discharged    Conner Bell MD, MD  [facility]  [phone]

## 2024-10-05 ENCOUNTER — APPOINTMENT (OUTPATIENT)
Dept: GENERAL RADIOLOGY | Facility: HOSPITAL | Age: 54
End: 2024-10-05
Attending: INTERNAL MEDICINE
Payer: COMMERCIAL

## 2024-10-05 ENCOUNTER — APPOINTMENT (OUTPATIENT)
Dept: CT IMAGING | Facility: HOSPITAL | Age: 54
End: 2024-10-05
Attending: INTERNAL MEDICINE
Payer: COMMERCIAL

## 2024-10-05 ENCOUNTER — PATIENT MESSAGE (OUTPATIENT)
Dept: HEMATOLOGY/ONCOLOGY | Age: 54
End: 2024-10-05

## 2024-10-05 LAB
ALBUMIN SERPL-MCNC: 2.5 G/DL (ref 3.2–4.8)
ALBUMIN/GLOB SERPL: 1 {RATIO} (ref 1–2)
ALP LIVER SERPL-CCNC: 165 U/L
ALT SERPL-CCNC: 16 U/L
ANION GAP SERPL CALC-SCNC: 5 MMOL/L (ref 0–18)
AST SERPL-CCNC: 32 U/L (ref ?–34)
BASOPHILS # BLD: 0 X10(3) UL (ref 0–0.2)
BASOPHILS NFR BLD: 0 %
BILIRUB SERPL-MCNC: 1 MG/DL (ref 0.3–1.2)
BUN BLD-MCNC: 15 MG/DL (ref 9–23)
CALCIUM BLD-MCNC: 8.1 MG/DL (ref 8.7–10.4)
CHLORIDE SERPL-SCNC: 105 MMOL/L (ref 98–112)
CO2 SERPL-SCNC: 28 MMOL/L (ref 21–32)
CREAT BLD-MCNC: 0.44 MG/DL
EGFRCR SERPLBLD CKD-EPI 2021: 126 ML/MIN/1.73M2 (ref 60–?)
EOSINOPHIL # BLD: 0 X10(3) UL (ref 0–0.7)
EOSINOPHIL NFR BLD: 0 %
ERYTHROCYTE [DISTWIDTH] IN BLOOD BY AUTOMATED COUNT: 15 %
GLOBULIN PLAS-MCNC: 2.4 G/DL (ref 2–3.5)
GLUCOSE BLD-MCNC: 91 MG/DL (ref 70–99)
HCT VFR BLD AUTO: 24.5 %
HGB BLD-MCNC: 8 G/DL
LYMPHOCYTES NFR BLD: 0.31 X10(3) UL (ref 1–4)
LYMPHOCYTES NFR BLD: 3 %
MCH RBC QN AUTO: 30.2 PG (ref 26–34)
MCHC RBC AUTO-ENTMCNC: 32.7 G/DL (ref 31–37)
MCV RBC AUTO: 92.5 FL
MONOCYTES # BLD: 0.1 X10(3) UL (ref 0.1–1)
MONOCYTES NFR BLD: 1 %
MORPHOLOGY: NORMAL
NEUTROPHILS # BLD AUTO: 8.37 X10 (3) UL (ref 1.5–7.7)
NEUTROPHILS NFR BLD: 95 %
NEUTS BAND NFR BLD: 1 %
NEUTS HYPERSEG # BLD: 9.98 X10(3) UL (ref 1.5–7.7)
OSMOLALITY SERPL CALC.SUM OF ELEC: 286 MOSM/KG (ref 275–295)
PLATELET # BLD AUTO: 163 10(3)UL (ref 150–450)
PLATELET MORPHOLOGY: NORMAL
POTASSIUM SERPL-SCNC: 3.7 MMOL/L (ref 3.5–5.1)
PROT SERPL-MCNC: 4.9 G/DL (ref 5.7–8.2)
RBC # BLD AUTO: 2.65 X10(6)UL
SODIUM SERPL-SCNC: 138 MMOL/L (ref 136–145)
TOTAL CELLS COUNTED BLD: 100
WBC # BLD AUTO: 10.4 X10(3) UL (ref 4–11)

## 2024-10-05 PROCEDURE — 99233 SBSQ HOSP IP/OBS HIGH 50: CPT | Performed by: INTERNAL MEDICINE

## 2024-10-05 PROCEDURE — 99232 SBSQ HOSP IP/OBS MODERATE 35: CPT | Performed by: INTERNAL MEDICINE

## 2024-10-05 PROCEDURE — 71045 X-RAY EXAM CHEST 1 VIEW: CPT | Performed by: INTERNAL MEDICINE

## 2024-10-05 PROCEDURE — 71250 CT THORAX DX C-: CPT | Performed by: INTERNAL MEDICINE

## 2024-10-05 PROCEDURE — 99232 SBSQ HOSP IP/OBS MODERATE 35: CPT | Performed by: STUDENT IN AN ORGANIZED HEALTH CARE EDUCATION/TRAINING PROGRAM

## 2024-10-05 RX ORDER — METHYLPREDNISOLONE SODIUM SUCCINATE 125 MG/2ML
60 INJECTION, POWDER, LYOPHILIZED, FOR SOLUTION INTRAMUSCULAR; INTRAVENOUS ONCE
Status: COMPLETED | OUTPATIENT
Start: 2024-10-05 | End: 2024-10-05

## 2024-10-05 RX ORDER — POTASSIUM CHLORIDE 14.9 MG/ML
20 INJECTION INTRAVENOUS ONCE
Status: COMPLETED | OUTPATIENT
Start: 2024-10-05 | End: 2024-10-05

## 2024-10-05 NOTE — PROGRESS NOTES
[facility] Progress Note   Length of Stay: 5    Subjective:   - s/p cholecystectomy tube placement, some soreness at site  - on 5L O2  - denies any pain    Oncology History   -2018: Per report had a normal EGD and colonoscopy     -June 2022: He met with GI due to new onset dysphagia which started about 1 month prior to his visit.  He had an esophagram with a focal abrupt narrowing with irregular margins in the distal one third of the esophagus extending to the GE junction.  He also had an episode of food impaction. He has also lost about 15 lb. He was a heavy smoker from age 14 to about 40 (1.5 PPD). Also with alcohol use currently. Mother with a history of breast and colon cancer.  EGD and EUS with Dr. Yadav on 6/7/2022: Severe esophagitis with a concerning mass extending 37-39 centimeters from the incisors.  Nonobstructive mass.  By EUS uT3N1 disease.  Pathology showed invasive moderate to poorly differentiated adenocarcinoma.  HER2 amplified by FISH. CT CAP done at [facility] on 6/16/22: Results not loaded to PACs yet.  CA 19-9 from the same day was 107.4.  CEA normal. PET/CT on 6/20/2022: Increased distal esophageal uptake with an SUV of 14.4.  Concern for shreya metastases.     -Concurrent chemoradiation with carboplatin AUC2 plus paclitaxel 50 mg/m2: July 2022-August 2022 with  down (280-->30). Post treatment PET/CT showed improvement.      -Surgery with Dr. Lu on 9/30/22: ypT1b pN0. Recovery has been complicated by an esophageal leak,      -I met with him on 12/12/22. We discussed adjuvant opdivo for 1 year. His  was elevated to 48 and repeat was 64. CT CAP was recommended.      -He was admitted on 12/25/22 for abdominal pain. He had a POEM procedure done. He was also noted to have a RLL consolidation. He was seen by pulmonary, based on imaging an outpatient PET/CT was recommended and a biopsy of the most FDG avid lesion would be considered. Noted to have CAD and  an outpatient evaluation was recommended. Outpatient PET/CT done on 1/4/23 was reviewed in tumor board and there was no focal area of concern and adjuvant immunotherapy recommended.      -Adjuvant opdivo:01/2023-03/2023. PET/CT in in 04/2023 showed uptake in the pancreatic head and tail. Also with some uptake in liver. EGD/EUS on 4/27/23 with Dr. Ritchie: Pancreatic head mass positive for adenocarcinoma: Comparison to previous esophageal cancer shows similarity but IHC in non-specific. Her 2 IHC was 2+ on this specimen but FISH was negative.  Given discordant results on initial HER2 and follow-up HER2 we decided to proceed with chemotherapy with immunotherapy with HER2 directed therapy.     -Chemo + Herceptin + Immunotherapy: He received 7 cycles of FOLFOX + Herceptin + Immunotherapy (05/2023-09/2023). Imaging after C5 showed a favorable response. After C7, we held oxaliplatin due to neuropathy and he was started on maintenance herceptin + IO maintenance. Signatera was negative 09/11/23.     -Herceptin + IO Maintenance: He received 3 cycles from 09/25/23-10/31/23. Treatment was delayed due to hospitalizations.      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 Due to new findings-restarting FOLFOX discussed.      -admitted from 1/8/24-1/13/24 for persistent dysphagia and bronchesophageal stent.      -1/17/24: EGD with fistula closure with ASD occluder and removal of the bronchial stent.     -FOLFOX + Herceptin + Opdivo restarted: 3 cycles: 02/2024-03/2024     -Treatment delayed again for recurrent hospital admissions     -He underwent a Latissimus dorsi flap reconstruction for his  bronchoesophageal fistula on 5/1/24     -PET/CT 5/15/24: at least 3 areas of uptake in the right hepatic lobe, uptake in pancrease and retroperitoneum. Post-operative uptake in chest wall.      -FOLFOX + Herceptin + Nivolumab Restarted: 6/11/2024 until 8/14/2024.  He received 5 cycles.  A PET scan after 5 cycles showed overall progression of metastatic disease with new gastrohepatic and peripancreatic lymph nodes along with enlargement of pancreatic and hepatic metastases.  There were also new lung nodules.  A CT-guided liver biopsy on 9/10/2024 was done which was positive for metastatic adenocarcinoma and the immunoprofile is compatible with the patient's esophageal primary. HER2 negative on FISH.     -He was admitted on 9/15/24 for abdominal pain. Labs showed elevated AST/ALT and T bili (6.7). CTA CAP showed progression of pancreatic and hepatic masses. There is intra-extra hepatic biliary ductal dilation. Subpleural reticular opacities noted-inflammatory or interstitial process? Hi Res CT recommended. He underwent an ERCP which showed a CBD stricture with biliary sphincterectomy and CBD, PD bile duct stent placement with Dr. Ritchie.     -Weekly paclitaxel 80 mg/m2: 3 weeks on 1 week off   -C1D1: 9/23/24:  42,110    ROS: 12 Point ROS completed and pertinent positives are above     Objective:    metRONIDAZOLE  500 mg Intravenous Q12H    oxyCODONE  10 mg Oral Q8H INNA    sodium ferric gluconate  125 mg Intravenous Daily    ceFAZolin  2 g Intravenous Q8H    baclofen  5 mg Oral BID    vancomycin  125 mg Oral Daily    apixaban  5 mg Oral BID    OLANZapine  5 mg Oral Nightly    pantoprazole  40 mg Oral Before breakfast    pregabalin  25 mg Oral BID    sertraline  200 mg Oral Daily       oxyCODONE    benzonatate    ipratropium-albuterol    heparin    acetaminophen    melatonin    glycerin-hypromellose-    sodium chloride    ondansetron    prochlorperazine    [DISCONTINUED] HYDROmorphone **OR** HYDROmorphone  **OR** [DISCONTINUED] HYDROmorphone    Physical Exam  Vitals:    10/05/24 0600   BP: 143/80   Pulse: 67   Resp: 18   Temp: 98.3 °F (36.8 °C)     General: NAD, AOX3  HEENT: clear op, mmm, no jvd, no scleral icterus   CV: RRR S1S2 no murmurs, no edema  Lungs: CTAB, no increased work of breathing  Abd: soft nt nd +BS no hepatosplenomegaly  Neuro: CN: II-XII grossly intact    Labs/Imaging/Path:  Lab Results   Component Value Date    WBC 10.4 10/05/2024    HGB 8.0 (L) 10/05/2024    HCT 24.5 (L) 10/05/2024    MCV 92.5 10/05/2024    .0 10/05/2024       Recent Labs   Lab 10/03/24  0522 10/04/24  0630 10/05/24  0619   GLU 89 109* 91   BUN 30* 21 15   CREATSERUM 0.74 0.49* 0.44*   CA 8.6* 8.2* 8.1*   ALB 2.3* 2.6* 2.5*   * 136 138   K 3.4*  3.4* 4.0  4.0 3.7    105 105   CO2 26.0 27.0 28.0   ALKPHO 140* 176* 165*   AST 63* 50* 32   ALT 36 27 16   BILT 1.0 1.0 1.0   TP 4.9* 5.0* 4.9*       Assessment and Plan:   Klebsiella pneumoniae Bacteremia: ID following. Possibly from recent CBD stent. On ceftriaxone. Ok to remove port if needed as he is on weekly taxol for his chemotherapy    Metastatic esophageal cancer  -Most recent imaging is consistent with metastatic disease progression.  Repeat HER2 is negative on FISH. We discussed that he will not be a good candidate for Cyramza due to history of esophageal fistula and PE/DVT. He is on weekly paclitaxel and received cycle 1 day 1 on 9/23/24. Treatment on hold.      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 CTA from 09/2024 shows resolution.   - restart eliquis 5mg BID. 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 He is s/p ERCP on 9/17/24 with CBD, PD stent placement.      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 Now s/p repair 5/1/24.   - wean O2 as tolerated    Will follow peripherally. Ok to DC if other inpt issues resolve.     David Seymour MD  Hematology/Medical Oncology  [facility]

## 2024-10-05 NOTE — PROGRESS NOTES
[facility]  Pulmonary/Critical Care Consult note    Dontae Buddy Ortegacristina Arechiga. Patient Status:  Inpatient    10/15/1970 MRN [id number]   Location [facility] 4NW-A Attending Nixon Vergara MD   Hosp Day # 5 PCP Adrian Horowitz MD       History of Present Illness:    Feeling well overall.  Overnight supplemental oxygen was increased to 7 L/min via high flow nasal cannula but weaned to 5 L via high flow cannula later this morning.    Reports retained secretions in the morning hours..  Still with abdominal pains    History:  Past Medical History:    Back problem    Belching    Black stools    Borderline diabetes    Dx in 2013 - HgA1C 6.2%    C. difficile diarrhea    pt treated and without symptoms    Chest pain    Coronary artery disease    On 13: CABG x 4 with LIMA to LAD and SVG to diagonal, OM and PDA    Decorative tattoo    Depression    Difficult intubation    h/o esophagectomy for CA; developed esophagobronchial vistula    Disorder of liver    LIVER CA    Esophageal cancer (HCC)    completed chemo    Esophageal reflux    Essential hypertension    Exposure to medical diagnostic radiation    last tx 2022    Frequent urination    Gastroparesis    Heartburn    High blood pressure    High cholesterol    Found when I had quadruple bypass    History of COVID-19    asymptomatic - pt was dx during a hospitalization for another diagnosis. No continued symptoms    History of stomach ulcers    Hyperlipidemia    Hyperlipidemia LDL goal < 70    Indigestion    Morbid obesity with BMI of 40.0-44.9, adult (HCC)    Muscle weakness    Nausea vomiting and diarrhea    Nontoxic multinodular goiter    Dx in 2013: pt was told that imaging showed thyroid cysts per PCP    Pancreatic cancer (HCC)    last dose 2023 is scheduled for another round 23    Peripheral vascular disease (HCC)    pt denies    Personal history of antineoplastic chemotherapy    for esophageal cancer/completed    Personal history of  antineoplastic chemotherapy    pancreatic cancer    Personal history of antineoplastic chemotherapy    Last treatment 3/12/24    Problems with swallowing    Pulmonary nodules    Dx in 8/2013: CT chest showed small bilateral fissural-based lung nodules less than 1 cm    S/P CABG x 4    On 8/16/13: CABG x 4 with LIMA to LAD and SVG to diagonal, OM and PDA    Shortness of breath    when coughing; no oxygen    Vomiting     Past Surgical History:   Procedure Laterality Date    Appendectomy      Appendectomy      Arthroscopy of joint unlisted      right shoulder    Cabg      On 8/16/13: CABG x 4 with LIMA to LAD and SVG to diagonal, OM and PDA    Cardiac cath lab      On 8/14/2013: cardiac cath showed 3-vessel disease    Other surgical history      1.       Laparoscopic robotic-assisted esophagogastrectomy.    Port, indwelling, imp       Family History   Problem Relation Age of Onset    Cancer Mother         breast and colon     Diabetes Neg       reports that he quit smoking about 11 years ago. His smoking use included cigarettes. He started smoking about 38 years ago. He has a 27 pack-year smoking history. He has never been exposed to tobacco smoke. He has never used smokeless tobacco. He reports that he does not drink alcohol and does not use drugs.    Allergies:  Allergies   Allergen Reactions    Oxaliplatin HIVES     Shaking        Medications:    Current Facility-Administered Medications:     potassium chloride 20 mEq/100mL IVPB premix 20 mEq, 20 mEq, Intravenous, Once    metRONIDAZOLE in sodium chloride 0.79% (Flagyl) 5 mg/mL IVPB premix 500 mg, 500 mg, Intravenous, Q12H    oxyCODONE immediate release tab 10 mg, 10 mg, Oral, Q4H PRN    oxyCODONE immediate release tab 10 mg, 10 mg, Oral, Q8H INNA    benzonatate (Tessalon) cap 100 mg, 100 mg, Oral, TID PRN    ipratropium-albuterol (Duoneb) 0.5-2.5 (3) MG/3ML inhalation solution 3 mL, 3 mL, Nebulization, Q6H PRN    ceFAZolin (Ancef) 2g in 10mL IV syringe premix, 2 g,  Intravenous, Q8H    baclofen (Lioresal) tab 5 mg, 5 mg, Oral, BID    vancomycin (Vancocin) cap 125 mg, 125 mg, Oral, Daily    heparin (Porcine) 100 Units/mL lock flush 500 Units, 5 mL, Intravenous, PRN    apixaban (Eliquis) tab 5 mg, 5 mg, Oral, BID    OLANZapine (ZyPREXA) tab 5 mg, 5 mg, Oral, Nightly    pantoprazole (Protonix) DR tab 40 mg, 40 mg, Oral, Before breakfast    pregabalin (Lyrica) cap 25 mg, 25 mg, Oral, BID    sertraline (Zoloft) tab 200 mg, 200 mg, Oral, Daily    acetaminophen (Tylenol Extra Strength) tab 500 mg, 500 mg, Oral, Q4H PRN    melatonin tab 3 mg, 3 mg, Oral, Nightly PRN    glycerin-hypromellose- (Artificial Tears) 0.2-0.2-1 % ophthalmic solution 1 drop, 1 drop, Both Eyes, QID PRN    sodium chloride (Saline Mist) 0.65 % nasal solution 1 spray, 1 spray, Each Nare, Q3H PRN    ondansetron (Zofran) 4 MG/2ML injection 4 mg, 4 mg, Intravenous, Q6H PRN    prochlorperazine (Compazine) 10 MG/2ML injection 5 mg, 5 mg, Intravenous, Q8H PRN    [DISCONTINUED] HYDROmorphone (Dilaudid) 1 MG/ML injection 0.2 mg, 0.2 mg, Intravenous, Q2H PRN **OR** HYDROmorphone (Dilaudid) 1 MG/ML injection 0.4 mg, 0.4 mg, Intravenous, Q2H PRN **OR** [DISCONTINUED] HYDROmorphone (Dilaudid) 1 MG/ML injection 0.8 mg, 0.8 mg, Intravenous, Q2H PRN    Intake/Output:    Intake/Output Summary (Last 24 hours) at 10/5/2024 1052  Last data filed at 10/5/2024 0600  Gross per 24 hour   Intake --   Output 1385 ml   Net -1385 ml      Body mass index is 22.11 kg/m².    Review of Systems  Review of Systems:   A comprehensive 10 point review of systems was completed.  Pertinent positives and negatives noted in the the HPI.         Vitals:    10/04/24 1630 10/04/24 2002 10/04/24 2138 10/05/24 0600   BP: 146/76 148/89  143/80   BP Location: Left arm Left arm  Left arm   Pulse: 84 79  67   Resp: 18 18 16 18   Temp: 98 °F (36.7 °C) 98.3 °F (36.8 °C)  98.3 °F (36.8 °C)   TempSrc: Oral Oral  Oral   SpO2: 91% 92%  94%   Weight:        Height:             Physical Exam  Constitutional:       General: He is not in acute distress.     Appearance: Normal appearance. He is not ill-appearing or diaphoretic.   HENT:      Head: Normocephalic and atraumatic.      Nose: Nose normal. No congestion or rhinorrhea.      Mouth/Throat:      Mouth: Mucous membranes are moist.      Pharynx: Oropharynx is clear. No oropharyngeal exudate or posterior oropharyngeal erythema.   Eyes:      Extraocular Movements: Extraocular movements intact.      Pupils: Pupils are equal, round, and reactive to light.   Cardiovascular:      Rate and Rhythm: Normal rate.      Pulses: Normal pulses.      Heart sounds: Normal heart sounds. No murmur heard.  Pulmonary:      Effort: Pulmonary effort is normal. No respiratory distress.      Breath sounds: Normal breath sounds. No wheezing or rhonchi.      Comments: Diminished breath sounds in bilateral lower lobes.  Crackles in left lower lung base  Chest:      Chest wall: No tenderness.   Abdominal:      General: Abdomen is flat. Bowel sounds are normal.      Palpations: Abdomen is soft.      Comments: Mild abdominal pain on deep palpation   Musculoskeletal:         General: Normal range of motion.   Skin:     General: Skin is warm.   Neurological:      General: No focal deficit present.      Mental Status: He is alert and oriented to person, place, and time.   Psychiatric:         Mood and Affect: Mood normal.         Behavior: Behavior normal.         Thought Content: Thought content normal.         Judgment: Judgment normal.            Lab Data Review:  Recent Labs   Lab 10/03/24  0522 10/04/24  0630 10/05/24  0618   WBC 9.3 10.2 10.4   HGB 8.2* 8.3* 8.0*   HCT 25.6* 25.4* 24.5*   .0 171.0 163.0   EOS  --   --  0       Recent Labs   Lab 10/03/24  0522 10/04/24  0630 10/05/24  0619   * 136 138   K 3.4*  3.4* 4.0  4.0 3.7    105 105   CO2 26.0 27.0 28.0   BUN 30* 21 15   CREATSERUM 0.74 0.49* 0.44*   CA 8.6* 8.2*  8.1*   ALB 2.3* 2.6* 2.5*   ALKPHO 140* 176* 165*   ALT 36 27 16   AST 63* 50* 32   GLU 89 109* 91     Component  Ref Range & Units 10/1/24 0625   Procalcitonin  <0.05 ng/mL 3.06 High      No results for input(s): \"MG\" in the last 168 hours.    Lab Results   Component Value Date    PHOS 2.9 03/15/2024        Recent Labs   Lab 10/03/24  0743 10/04/24  0630 10/04/24  1257   INR 2.02* 2.20* 2.15*       Recent Labs   Lab 10/01/24  1412   ABGPHT 7.40   OOSUNK1M 43   RWMPH7L 79*   ABGHCO3 26.1   ABGBE 1.6   TEMP 98.6   TACOS Positive   SITE Left Radial   DEV Nasal cannula   THGB 8.6*       No results for input(s): \"TROP\", \"CKMB\" in the last 168 hours.    Cultures:   Hospital Encounter on 09/30/24   1. Blood Culture     Status: None (Preliminary result)    Collection Time: 10/01/24 12:19 PM    Specimen: Blood,peripheral   Result Value Ref Range    Blood Culture Result No Growth 3 Days N/A           Radiology personally reviewed:  XR CHEST AP PORTABLE  (CPT=71045)    Result Date: 10/5/2024  CONCLUSION:  1. Worsening opacities bilaterally, most predominant within the left mid to lower lung zone, suggestive of pneumonia. 2. Interstitial opacities likely representing edema. 3. Small bilateral pleural effusions.    LOCATION:  [facility]      Dictated by (CST): Jenny Navarrete MD on 10/05/2024 at 7:19 AM     Finalized by (CST): Jenny Navarrete MD on 10/05/2024 at 7:20 AM       IR CHOLECYSTOSTOMY    Result Date: 10/4/2024  CONCLUSION: Successful placement of 8.5 Fr percutaneous transhepatic cholecystostomy tube.   LOCATION:  [facility]       Dictated by (CST): Shar Simms MD on 10/04/2024 at 2:54 PM     Finalized by (CST): Shar Simms MD on 10/04/2024 at 2:56 PM         Patient Active Problem List   Diagnosis    Primary hypertension    Hyperlipidemia with target low density lipoprotein (LDL) cholesterol less than 70 mg/dL    Coronary artery disease involving coronary bypass graft of native heart with angina pectoris (HCC)    S/P  CABG x 4    Nontoxic multinodular goiter    Pulmonary nodules    Thrombophlebitis leg    BRANDAN (generalized anxiety disorder)    Right shoulder Arthroscopy acromioplasty ,distal  clavicle resection, rotator cuff/labral debridment  Global 05/13/2021    Right bicipital tenosynovitis    Malignant neoplasm of esophagus (HCC)    Pleural effusion    Esophageal anastomotic leak    Esophageal obstruction    On total parenteral nutrition (TPN)    Mechanical complication of esophagostomy (HCC)    Abdominal pain of unknown etiology    Migration of esophageal stent    Gastroparesis    Esophageal carcinoma (HCC)    Normocytic anemia    Esophageal fistula    Subacute cough    Acquired bronchoesophageal fistula (HCC)    Pneumonia of both lower lobes due to infectious organism    Malignant neoplasm of pancreas (HCC)    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 difficile colitis    Dysphagia, unspecified type    Dyspnea and respiratory abnormalities    Hypokalemia    Dysphagia    Narcotic drug use    History of esophageal cancer    Palliative care by specialist    Neoplasm related pain    Endobronchial mass    Hyponatremia    Thrombocytopenia (HCC)    Acute kidney injury (HCC)    Hyperglycemia    Aspiration pneumonitis (HCC)    C. difficile diarrhea    Aspiration pneumonia (HCC)    Malignant neoplasm metastatic to liver (HCC)    Hyperlipidemia    Nausea vomiting and diarrhea    Fistula, bronchoesophageal (HCC)    Iron deficiency anemia, unspecified iron deficiency anemia type    Azotemia    Palliative care encounter    Goals of care, counseling/discussion    Cancer related pain    Anemia    Fistula    Acute cough    Pneumonia of  right lung due to infectious organism, unspecified part of lung    Bacteremia    Acute postoperative pain    Exocrine pancreatic insufficiency (HCC)    Hypertriglyceridemia    Acute pulmonary embolism without acute cor pulmonale, unspecified pulmonary embolism type (HCC)    Acute deep vein thrombosis (DVT) of popliteal vein of right lower extremity (HCC)    Altered mental status, unspecified altered mental status type    Transaminitis    Biliary obstruction (HCC)    Hyperbilirubinemia    Esophageal adenocarcinoma (HCC)    Cancer associated pain    Abdominal pain, acute    Abnormal CT of the chest    Lower extremity edema    Abnormal finding of diagnostic imaging    Metastatic adenocarcinoma to esophagus (HCC)    Klebsiella pneumoniae sepsis (HCC)    Septic shock (HCC)    Acalculous cholecystitis    Severe malnutrition (HCC)    Metastatic adenocarcinoma (HCC)    Acute cholecystitis       Assessment:  Acute hypoxic respiratory failure: Required oxygen 5 L/min high flow nasal cannula   Pulmonary nodules  Klebsiella pneumoniae bacteremia suspected due to acute cholecystitis  Groundglass opacities involving by lateral upper lobes and left lower lobe peripheral: This could represent atypical bacterial infection versus viral infection versus related to chemotherapy etc.: Chest x-ray today 10/5/2024 worsening left retrocardiac opacity along with opacity in right upper lobe and right lower lobe although with no leukocytosis or fever  Nonocclusive pulmonary embolism and left upper lobe and left lower lobe branches of pulmonary artery  Mediastinal adenopathy with conglomeration of AP window lymph nodes 2.5 x 2.4 cm  Pancreatic cancer status postchemotherapy  Depression, liver cancer,  GERD,   Dilated esophagus with pull-through procedure previously after esophageal cancer resection  Hypertension,  Hyperlipidemia,  Morbid obesity  History of pulmonary nodules.      Plan:  Wean FiO2 off to keep oxygen saturation between 90% to  92%  Cholecystostomy tube placement per interventional radiology today  Use flutter device every 4 hours as possible to help cough out secretions  Incentive spirometry every 4 hours as possible  DuoNebs every 6 hours as needed  Continue current antibiotics  Infectious disease follow-up   Eliquis on hold  DVT prophylaxis: SCD boots   GI prophylaxis: ---  Will follow for further recommendations  Follow labs, procalcitonin   Repeat noncontrast CT chest to evaluate left lower lobe retrocardiac opacity and right lung infiltrates    Thank You for allowing me to participate in this patient's care     Dusty Brooks MD    Note to the patient: The 21st Century Cures Act makes medical notes like these available to patients in the interest of transparency. However, be advised that this is a medical document. It is intended as peer to peer communication. It is written in medical language and may contain abbreviations or verbiage that are unfamiliar. It may appear blunt or direct. Medical documents are intended to carry relevant information, facts as evident, and clinical opinion of the practitioner.      Disclaimer: Components of this note were documented using voice recognition system and are subject to errors not corrected at proofreading. Contact the author of this note for any clarifications

## 2024-10-05 NOTE — PROGRESS NOTES
Pt A&Ox4 on 5L nasal cannula, o2 dropped again needed to put pt on high flow 7L to recover paged respiratory to give a prn neb treatment. Pt has more secretions at night. Per Dr. Foster notes stated to resume eliquis, confirmed with rob BRAGG said okay to resume tonight. Placed order off hold. Drain intact. Pain meds needed Q2hr. Prn pain medications given. Call light within reach, frequent checks made, needs met.

## 2024-10-05 NOTE — PLAN OF CARE
Problem: CARDIOVASCULAR - ADULT  Goal: Maintains optimal cardiac output and hemodynamic stability  Description: INTERVENTIONS:  - Monitor vital signs, rhythm, and trends  - Monitor for bleeding, hypotension and signs of decreased cardiac output  - Evaluate effectiveness of vasoactive medications to optimize hemodynamic stability  - Monitor arterial and/or venous puncture sites for bleeding and/or hematoma  - Assess quality of pulses, skin color and temperature  - Assess for signs of decreased coronary artery perfusion - ex. Angina  - Evaluate fluid balance, assess for edema, trend weights  Outcome: Progressing     Problem: RESPIRATORY - ADULT  Goal: Achieves optimal ventilation and oxygenation  Description: INTERVENTIONS:  - Assess for changes in respiratory status  - Assess for changes in mentation and behavior  - Position to facilitate oxygenation and minimize respiratory effort  - Oxygen supplementation based on oxygen saturation or ABGs  - Provide Smoking Cessation handout, if applicable  - Encourage broncho-pulmonary hygiene including cough, deep breathe, Incentive Spirometry  - Assess the need for suctioning and perform as needed  - Assess and instruct to report SOB or any respiratory difficulty  - Respiratory Therapy support as indicated  - Manage/alleviate anxiety  - Monitor for signs/symptoms of CO2 retention  Outcome: Progressing     Problem: GASTROINTESTINAL - ADULT  Goal: Maintains or returns to baseline bowel function  Description: INTERVENTIONS:  - Assess bowel function  - Maintain adequate hydration with IV or PO as ordered and tolerated  - Evaluate effectiveness of GI medications  - Encourage mobilization and activity  - Obtain nutritional consult as needed  - Establish a toileting routine/schedule  - Consider collaborating with pharmacy to review patient's medication profile  Outcome: Progressing     Problem: METABOLIC/FLUID AND ELECTROLYTES - ADULT  Goal: Hemodynamic stability and optimal renal  function maintained  Description: INTERVENTIONS:  - Monitor labs and assess for signs and symptoms of volume excess or deficit  - Monitor intake, output and patient weight  - Monitor urine specific gravity, serum osmolarity and serum sodium as indicated or ordered  - Monitor response to interventions for patient's volume status, including labs, urine output, blood pressure (other measures as available)  - Encourage oral intake as appropriate  - Instruct patient on fluid and nutrition restrictions as appropriate  Outcome: Progressing     Problem: SKIN/TISSUE INTEGRITY - ADULT  Goal: Skin integrity remains intact  Description: INTERVENTIONS  - Assess and document risk factors for pressure ulcer development  - Assess and document skin integrity  - Monitor for areas of redness and/or skin breakdown  - Initiate interventions, skin care algorithm/standards of care as needed  Outcome: Progressing  Goal: Incision(s), wounds(s) or drain site(s) healing without S/S of infection  Description: INTERVENTIONS:  - Assess and document risk factors for pressure ulcer development  - Assess and document skin integrity  - Assess and document dressing/incision, wound bed, drain sites and surrounding tissue  - Implement wound care per orders  - Initiate isolation precautions as appropriate  - Initiate Pressure Ulcer prevention bundle as indicated  Outcome: Progressing     Problem: HEMATOLOGIC - ADULT  Goal: Maintains hematologic stability  Description: INTERVENTIONS  - Assess for signs and symptoms of bleeding or hemorrhage  - Monitor labs and vital signs for trends  - Administer supportive blood products/factors, fluids and medications as ordered and appropriate  - Administer supportive blood products/factors as ordered and appropriate  Outcome: Progressing  Goal: Free from bleeding injury  Description: (Example usage: patient with low platelets)  INTERVENTIONS:  - Avoid intramuscular injections, enemas and rectal medication  administration  - Ensure safe mobilization of patient  - Hold pressure on venipuncture sites to achieve adequate hemostasis  - Assess for signs and symptoms of internal bleeding  - Monitor lab trends  - Patient is to report abnormal signs of bleeding to staff  - Avoid use of toothpicks and dental floss  - Use electric shaver for shaving  - Use soft bristle tooth brush  - Limit straining and forceful nose blowing  Outcome: Progressing     Problem: MUSCULOSKELETAL - ADULT  Goal: Return mobility to safest level of function  Description: INTERVENTIONS:  - Assess patient stability and activity tolerance for standing, transferring and ambulating w/ or w/o assistive devices  - Assist with transfers and ambulation using safe patient handling equipment as needed  - Ensure adequate protection for wounds/incisions during mobilization  - Obtain PT/OT consults as needed  - Advance activity as appropriate  - Communicate ordered activity level and limitations with patient/family  Outcome: Progressing     Problem: PAIN - ADULT  Goal: Verbalizes/displays adequate comfort level or patient's stated pain goal  Description: INTERVENTIONS:  - Encourage pt to monitor pain and request assistance  - Assess pain using appropriate pain scale  - Administer analgesics based on type and severity of pain and evaluate response  - Implement non-pharmacological measures as appropriate and evaluate response  - Consider cultural and social influences on pain and pain management  - Manage/alleviate anxiety  - Utilize distraction and/or relaxation techniques  - Monitor for opioid side effects  - Notify MD/LIP if interventions unsuccessful or patient reports new pain  - Anticipate increased pain with activity and pre-medicate as appropriate  Outcome: Progressing     Problem: SAFETY ADULT - FALL  Goal: Free from fall injury  Description: INTERVENTIONS:  - Assess pt frequently for physical needs  - Identify cognitive and physical deficits and behaviors  that affect risk of falls.  - [place] fall precautions as indicated by assessment.  - Educate pt/family on patient safety including physical limitations  - Instruct pt to call for assistance with activity based on assessment  - Modify environment to reduce risk of injury  - Provide assistive devices as appropriate  - Consider OT/PT consult to assist with strengthening/mobility  - Encourage toileting schedule  Outcome: Progressing     Problem: Patient/Family Goals  Goal: Patient/Family Long Term Goal  Description: Patient's Long Term Goal: return home    Interventions:  - pulmonary toilet (coughing/deep breathing/incentive spirometer and flutter valve 10x hourly while awake  - See additional Care Plan goals for specific interventions  Outcome: Progressing  Goal: Patient/Family Short Term Goal  Description: Patient's Short Term Goal: wean levophed    Interventions:   - allow for frequent monitoring of BP  - anitbiotics  - See additional Care Plan goals for specific interventions  Outcome: Progressing

## 2024-10-05 NOTE — PROGRESS NOTES
[facility]  Progress Note    Dontae Cortez . Patient Status:  Inpatient    10/15/1970 MRN [id number]   Location [facility] 4NW-A Attending Sakina Hernandez MD   Hosp Day # 5 PCP Adrian Horowitz MD     Subjective:  The patient is resting in bed. He underwent IR cholecystotomy tube placement yesterday.     He reports some abdominal soreness. He denies nausea or vomiting. He is tolerating a general diet. There is one documented bowel movement.    Objective/Physical Exam:  General: Alert, orientated x3.  Cooperative.  No apparent distress.  Vital Signs:  Blood pressure 143/80, pulse 67, temperature 98.3 °F (36.8 °C), temperature source Oral, resp. rate 18, height 74\", weight 172 lb 2.9 oz (78.1 kg), SpO2 94%.  HEENT: Normocephalic, atraumatic. No scleral icterus.  Abdomen:  Soft, non-distended, tenderness with palpation in RUQ with mild generalized tenderness, with no rebound or guarding.  No peritoneal signs.  Drain: in place with 185 ml of bilious output over the past 24 hours.    Labs:  CBC:    Lab Results   Component Value Date    WBC 10.4 10/05/2024    WBC 10.2 10/04/2024    WBC 9.3 10/03/2024     Lab Results   Component Value Date    HGB 8.0 (L) 10/05/2024    HGB 8.3 (L) 10/04/2024    HGB 8.2 (L) 10/03/2024      Lab Results   Component Value Date    .0 10/05/2024    .0 10/04/2024    .0 10/03/2024     Lab Results   Component Value Date    WBC 10.4 10/05/2024    HGB 8.0 10/05/2024    HCT 24.5 10/05/2024    .0 10/05/2024    CREATSERUM 0.44 10/05/2024    BUN 15 10/05/2024     10/05/2024    K 3.7 10/05/2024     10/05/2024    CO2 28.0 10/05/2024    GLU 91 10/05/2024    CA 8.1 10/05/2024    ALB 2.5 10/05/2024    ALKPHO 165 10/05/2024    BILT 1.0 10/05/2024    TP 4.9 10/05/2024    AST 32 10/05/2024    ALT 16 10/05/2024    INR 2.15 10/04/2024         Assessment/Plan:  Patient Active Problem List   Diagnosis    Primary hypertension    Hyperlipidemia with target low  density lipoprotein (LDL) cholesterol less than 70 mg/dL    Coronary artery disease involving coronary bypass graft of native heart with angina pectoris (HCC)    S/P CABG x 4    Nontoxic multinodular goiter    Pulmonary nodules    Thrombophlebitis leg    BRANDAN (generalized anxiety disorder)    Right shoulder Arthroscopy acromioplasty ,distal  clavicle resection, rotator cuff/labral debridment  Global 05/13/2021    Right bicipital tenosynovitis    Malignant neoplasm of esophagus (HCC)    Pleural effusion    Esophageal anastomotic leak    Esophageal obstruction    On total parenteral nutrition (TPN)    Mechanical complication of esophagostomy (HCC)    Abdominal pain of unknown etiology    Migration of esophageal stent    Gastroparesis    Esophageal carcinoma (HCC)    Normocytic anemia    Esophageal fistula    Subacute cough    Acquired bronchoesophageal fistula (HCC)    Pneumonia of both lower lobes due to infectious organism    Malignant neoplasm of pancreas (HCC)    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 difficile colitis    Dysphagia, unspecified type    Dyspnea and respiratory abnormalities    Hypokalemia    Dysphagia    Narcotic drug use    History of esophageal cancer    Palliative care by specialist    Neoplasm related pain    Endobronchial mass    Hyponatremia    Thrombocytopenia (HCC)    Acute kidney injury (HCC)    Hyperglycemia    Aspiration pneumonitis (HCC)    C. difficile diarrhea    Aspiration pneumonia (HCC)    Malignant neoplasm metastatic to liver (HCC)    Hyperlipidemia    Nausea vomiting and diarrhea    Fistula, bronchoesophageal (HCC)    Iron deficiency anemia, unspecified iron deficiency anemia  type    Azotemia    Palliative care encounter    Goals of care, counseling/discussion    Cancer related pain    Anemia    Fistula    Acute cough    Pneumonia of right lung due to infectious organism, unspecified part of lung    Bacteremia    Acute postoperative pain    Exocrine pancreatic insufficiency (HCC)    Hypertriglyceridemia    Acute pulmonary embolism without acute cor pulmonale, unspecified pulmonary embolism type (HCC)    Acute deep vein thrombosis (DVT) of popliteal vein of right lower extremity (HCC)    Altered mental status, unspecified altered mental status type    Transaminitis    Biliary obstruction (HCC)    Hyperbilirubinemia    Esophageal adenocarcinoma (HCC)    Cancer associated pain    Abdominal pain, acute    Abnormal CT of the chest    Lower extremity edema    Abnormal finding of diagnostic imaging    Metastatic adenocarcinoma to esophagus (HCC)    Klebsiella pneumoniae sepsis (HCC)    Septic shock (HCC)    Acalculous cholecystitis    Severe malnutrition (HCC)    Metastatic adenocarcinoma (HCC)    Acute cholecystitis     PPD 1 IR cholecystotomy tube placement  Acute acalculous cholecystitis  Esophageal cancer with metastases to the liver    The patient is stable for discharge home today from a general surgery standpoint.  Continue drain care.  Continue IV antibiotic per ID.  Continue pain control and antiemetics as needed.  Encourage ambulation and up to chair.  DVT prophylaxis with Eliquis  GI prophylaxis with Protonix.  He is to follow up with Dr. Carrington as an outpatient.      ADDIE Clark  10/5/2024  8:39 AM     This note was initiated by Yojana Zuñiga PA-C.  The PA saw the patient in conjunction with me. The PA performed a history, exam, and developed the assessment and plan. I agree with the mentioned assessment and plan and have provided further documentation of my own, if necessary.    Patient is having some expected abdominal soreness around the tube.  He is otherwise doing  well and tolerating diet.  His abdomen is soft, nondistended and nontender to palpation with some mild right upper quadrant tenderness.  The cholecystostomy tube is in place and draining bilious fluid.    Patient is okay to discharge today from general surgery standpoint.  Percutaneous cholecystostomy tube should remain in place for at least 6 weeks.  Patient should follow-up with Dr. Carrington as an outpatient to discuss long-term plans for the cholecystostomy tube.  Options would include capping trial and possible removal after 6 weeks versus scheduling cholecystectomy.  Patient would likely not be a surgical candidate given his presenting metastatic disease despite chemotherapy.  Further discussion to be had between patient, Dr. Carrington and Dr. Foster.    Jd Burroughs MD  [facility] [place] [facility]

## 2024-10-05 NOTE — PLAN OF CARE
Problem: CARDIOVASCULAR - ADULT  Goal: Maintains optimal cardiac output and hemodynamic stability  Description: INTERVENTIONS:  - Monitor vital signs, rhythm, and trends  - Monitor for bleeding, hypotension and signs of decreased cardiac output  - Evaluate effectiveness of vasoactive medications to optimize hemodynamic stability  - Monitor arterial and/or venous puncture sites for bleeding and/or hematoma  - Assess quality of pulses, skin color and temperature  - Assess for signs of decreased coronary artery perfusion - ex. Angina  - Evaluate fluid balance, assess for edema, trend weights  Outcome: Progressing     Problem: RESPIRATORY - ADULT  Goal: Achieves optimal ventilation and oxygenation  Description: INTERVENTIONS:  - Assess for changes in respiratory status  - Assess for changes in mentation and behavior  - Position to facilitate oxygenation and minimize respiratory effort  - Oxygen supplementation based on oxygen saturation or ABGs  - Provide Smoking Cessation handout, if applicable  - Encourage broncho-pulmonary hygiene including cough, deep breathe, Incentive Spirometry  - Assess the need for suctioning and perform as needed  - Assess and instruct to report SOB or any respiratory difficulty  - Respiratory Therapy support as indicated  - Manage/alleviate anxiety  - Monitor for signs/symptoms of CO2 retention  Outcome: Progressing     Problem: GASTROINTESTINAL - ADULT  Goal: Maintains or returns to baseline bowel function  Description: INTERVENTIONS:  - Assess bowel function  - Maintain adequate hydration with IV or PO as ordered and tolerated  - Evaluate effectiveness of GI medications  - Encourage mobilization and activity  - Obtain nutritional consult as needed  - Establish a toileting routine/schedule  - Consider collaborating with pharmacy to review patient's medication profile  Outcome: Progressing     Problem: METABOLIC/FLUID AND ELECTROLYTES - ADULT  Goal: Hemodynamic stability and optimal renal  function maintained  Description: INTERVENTIONS:  - Monitor labs and assess for signs and symptoms of volume excess or deficit  - Monitor intake, output and patient weight  - Monitor urine specific gravity, serum osmolarity and serum sodium as indicated or ordered  - Monitor response to interventions for patient's volume status, including labs, urine output, blood pressure (other measures as available)  - Encourage oral intake as appropriate  - Instruct patient on fluid and nutrition restrictions as appropriate  Outcome: Progressing     Problem: SKIN/TISSUE INTEGRITY - ADULT  Goal: Skin integrity remains intact  Description: INTERVENTIONS  - Assess and document risk factors for pressure ulcer development  - Assess and document skin integrity  - Monitor for areas of redness and/or skin breakdown  - Initiate interventions, skin care algorithm/standards of care as needed  Outcome: Progressing  Goal: Incision(s), wounds(s) or drain site(s) healing without S/S of infection  Description: INTERVENTIONS:  - Assess and document risk factors for pressure ulcer development  - Assess and document skin integrity  - Assess and document dressing/incision, wound bed, drain sites and surrounding tissue  - Implement wound care per orders  - Initiate isolation precautions as appropriate  - Initiate Pressure Ulcer prevention bundle as indicated  Outcome: Progressing     Problem: HEMATOLOGIC - ADULT  Goal: Maintains hematologic stability  Description: INTERVENTIONS  - Assess for signs and symptoms of bleeding or hemorrhage  - Monitor labs and vital signs for trends  - Administer supportive blood products/factors, fluids and medications as ordered and appropriate  - Administer supportive blood products/factors as ordered and appropriate  Outcome: Progressing  Goal: Free from bleeding injury  Description: (Example usage: patient with low platelets)  INTERVENTIONS:  - Avoid intramuscular injections, enemas and rectal medication  administration  - Ensure safe mobilization of patient  - Hold pressure on venipuncture sites to achieve adequate hemostasis  - Assess for signs and symptoms of internal bleeding  - Monitor lab trends  - Patient is to report abnormal signs of bleeding to staff  - Avoid use of toothpicks and dental floss  - Use electric shaver for shaving  - Use soft bristle tooth brush  - Limit straining and forceful nose blowing  Outcome: Progressing     Problem: MUSCULOSKELETAL - ADULT  Goal: Return mobility to safest level of function  Description: INTERVENTIONS:  - Assess patient stability and activity tolerance for standing, transferring and ambulating w/ or w/o assistive devices  - Assist with transfers and ambulation using safe patient handling equipment as needed  - Ensure adequate protection for wounds/incisions during mobilization  - Obtain PT/OT consults as needed  - Advance activity as appropriate  - Communicate ordered activity level and limitations with patient/family  Outcome: Progressing     Problem: PAIN - ADULT  Goal: Verbalizes/displays adequate comfort level or patient's stated pain goal  Description: INTERVENTIONS:  - Encourage pt to monitor pain and request assistance  - Assess pain using appropriate pain scale  - Administer analgesics based on type and severity of pain and evaluate response  - Implement non-pharmacological measures as appropriate and evaluate response  - Consider cultural and social influences on pain and pain management  - Manage/alleviate anxiety  - Utilize distraction and/or relaxation techniques  - Monitor for opioid side effects  - Notify MD/LIP if interventions unsuccessful or patient reports new pain  - Anticipate increased pain with activity and pre-medicate as appropriate  Outcome: Progressing     Problem: SAFETY ADULT - FALL  Goal: Free from fall injury  Description: INTERVENTIONS:  - Assess pt frequently for physical needs  - Identify cognitive and physical deficits and behaviors  that affect risk of falls.  - [place] fall precautions as indicated by assessment.  - Educate pt/family on patient safety including physical limitations  - Instruct pt to call for assistance with activity based on assessment  - Modify environment to reduce risk of injury  - Provide assistive devices as appropriate  - Consider OT/PT consult to assist with strengthening/mobility  - Encourage toileting schedule  Outcome: Progressing     Problem: Patient/Family Goals  Goal: Patient/Family Long Term Goal  Description: Patient's Long Term Goal: return home    Interventions:  - pulmonary toilet (coughing/deep breathing/incentive spirometer and flutter valve 10x hourly while awake  - See additional Care Plan goals for specific interventions  Outcome: Progressing  Goal: Patient/Family Short Term Goal  Description: Patient's Short Term Goal: wean levophed    Interventions:   - allow for frequent monitoring of BP  - anitbiotics  - See additional Care Plan goals for specific interventions  Outcome: Progressing       Patient was NPO since MN for IR drain placement. Call received from IR in AM that INR istoo elevated for drain placement and target INR is less than 1.8, preferably 1.5. Dr. Carrington at bedside and informed. Dr. Carrington recommended Kcentra administration and instructed writer to discuss this with the hospitalist for orders. Page to Dr. Hernandez at [phone] and informed MD of the above information, per Dr. Hernandez, management of the INR is to be deffered to heme/onc. New orders noted from heme onc Dr. Foster for phytonadione administration, writer confirmed with Dr. Foster that the Phytonadione is for INR reversal in preparation for the drain placement. Writer informed IR RN and IR rad Dr. Myles. INR recheck drawn and sent to lab s/p administration as ordered. IR staff aware of INR results and will continue with drain placement today. Patient down to IR at approx 1400 via bed and IR staff. Patient alert and oriented.  Patient returned to unit from IR, site CDI, sofy site CDI, dark green drainage to bag. Post procedure vitals and monitoring provided. Pt w wound to buttocks, updated photo taken and in media. Wound care consulted, meplilex applied. Extra pillow and waffle cushion provided to patient for repositioning. Writer educated patient and spouse on importance of turn q2 for pulmonary and skin health, both persons verbalize understanding. Patient able to turn self in bed. IV and PO medications given for pain, palliative following. Fall and safety precautions in place, Patient tolerating diet s/p drain placement. Patient weaned down to 0.5L in AM prior to drain placement. Patient on 4L s/p drain placement. Per report patient required increased oxygen last night, writer discussed this with Dr. Brooks. Patient declined application of SCDs, education on DVT risks and complications provided. Patient and spouse updated and in agreement with POC. Patient progressing per POC. Education provided to pt and spouse on drain management at home.

## 2024-10-05 NOTE — PLAN OF CARE
Patient is alert and oriented x 4. VSS. No s/s of distress noted. Patient states pain was 9 out of 10 on pain scale, prn dilaudid given per mar. Patient showered this shift per request. CT scan of chest done this shift. On oxygen at 5 L via nasal cannula for occasional desaturation. Medications given per mar. Safety precautions in place for patient. Call light within reach.    Problem: RESPIRATORY - ADULT  Goal: Achieves optimal ventilation and oxygenation  Description: INTERVENTIONS:  - Assess for changes in respiratory status  - Assess for changes in mentation and behavior  - Position to facilitate oxygenation and minimize respiratory effort  - Oxygen supplementation based on oxygen saturation or ABGs  - Provide Smoking Cessation handout, if applicable  - Encourage broncho-pulmonary hygiene including cough, deep breathe, Incentive Spirometry  - Assess the need for suctioning and perform as needed  - Assess and instruct to report SOB or any respiratory difficulty  - Respiratory Therapy support as indicated  - Manage/alleviate anxiety  - Monitor for signs/symptoms of CO2 retention  Outcome: Progressing     Problem: METABOLIC/FLUID AND ELECTROLYTES - ADULT  Goal: Hemodynamic stability and optimal renal function maintained  Description: INTERVENTIONS:  - Monitor labs and assess for signs and symptoms of volume excess or deficit  - Monitor intake, output and patient weight  - Monitor urine specific gravity, serum osmolarity and serum sodium as indicated or ordered  - Monitor response to interventions for patient's volume status, including labs, urine output, blood pressure (other measures as available)  - Encourage oral intake as appropriate  - Instruct patient on fluid and nutrition restrictions as appropriate  Outcome: Progressing     Problem: SKIN/TISSUE INTEGRITY - ADULT  Goal: Incision(s), wounds(s) or drain site(s) healing without S/S of infection  Description: INTERVENTIONS:  - Assess and document risk factors  for pressure ulcer development  - Assess and document skin integrity  - Assess and document dressing/incision, wound bed, drain sites and surrounding tissue  - Implement wound care per orders  - Initiate isolation precautions as appropriate  - Initiate Pressure Ulcer prevention bundle as indicated  Outcome: Progressing

## 2024-10-05 NOTE — PROGRESS NOTES
[facility]   part of [facility]     Hospitalist Progress Note     Dontae Cortez . Patient Status:  Inpatient    10/15/1970 MRN [id number]   Location [facility] 4NW-A Attending Nixon Vergara MD   Hosp Day # 5 PCP Adrian Horowitz MD     Chief Complaint: abdominal pain    Subjective:     Patient seen with patient's wife at bedside.  Status post cholecystostomy tube placed on 10/4/2024 right upper quadrant abdominal pain improving to 7 out of 10 pain today.  No nausea vomiting.      Objective:    Review of Systems:   A comprehensive review of systems was completed; pertinent positive and negatives stated in subjective.    Vital signs:  Temp:  [97.5 °F (36.4 °C)-98.3 °F (36.8 °C)] (P) 97.5 °F (36.4 °C)  Pulse:  [67-87] 67  Resp:  [16-20] (P) 20  BP: (142-148)/(76-89) 143/80  SpO2:  [91 %-94 %] 94 %    Physical Exam:    /80 (BP Location: Left arm)   Pulse 67   Temp (P) 97.5 °F (36.4 °C) (Oral)   Resp (P) 20   Ht 6' 2\" (1.88 m)   Wt 172 lb 2.9 oz (78.1 kg)   SpO2 94%   BMI 22.11 kg/m²     General: No acute distress  Respiratory: No wheezes, no rhonchi  Cardiovascular: S1, S2, regular rate and rhythm  Abdomen: Soft, right upper quadrant tenderness, right-sided percutaneous cholecystostomy tube present draining bilious drainage, non-distended, positive bowel sounds  Neuro: No new focal deficits.   Extremities: No edema        Diagnostic Data:    Labs:  Recent Labs   Lab 10/01/24  0625 10/01/24  0811 10/02/24  0431 10/03/24  0522 10/03/24  0743 10/04/24  0630 10/04/24  1257 10/05/24  0618   WBC 13.2*  --  14.2* 9.3  --  10.2  --  10.4   HGB 9.4*  --  8.7* 8.2*  --  8.3*  --  8.0*   MCV 90.6  --  92.0 94.1  --  90.7  --  92.5   .0  --  203.0 156.0  --  171.0  --  163.0   BAND  --   --   --   --   --   --   --  1   INR  --  4.19* 2.60*  --  2.02* 2.20* 2.15*  --        Recent Labs   Lab 10/03/24  0522 10/04/24  0630 10/05/24  0619   GLU 89 109* 91   BUN 30* 21 15   CREATSERUM 0.74  0.49* 0.44*   CA 8.6* 8.2* 8.1*   ALB 2.3* 2.6* 2.5*   * 136 138   K 3.4*  3.4* 4.0  4.0 3.7    105 105   CO2 26.0 27.0 28.0   ALKPHO 140* 176* 165*   AST 63* 50* 32   ALT 36 27 16   BILT 1.0 1.0 1.0   TP 4.9* 5.0* 4.9*       Estimated Creatinine Clearance: 212 mL/min (A) (based on SCr of 0.44 mg/dL (L)).    No results for input(s): \"TROP\", \"TROPHS\", \"CK\" in the last 168 hours.    Recent Labs   Lab 10/03/24  0743 10/04/24  0630 10/04/24  1257   PTP 23.0* 24.6* 24.2*   INR 2.02* 2.20* 2.15*                  Microbiology    Hospital Encounter on 09/30/24   1. Aerobic Bacterial Culture     Status: None (Preliminary result)    Collection Time: 10/04/24  2:40 PM    Specimen: Bile; Body fluid, unspecified   Result Value Ref Range    Aerobic Smear No WBCs seen N/A    Aerobic Smear No organisms seen N/A   2. Blood Culture     Status: None (Preliminary result)    Collection Time: 10/01/24 12:19 PM    Specimen: Blood,peripheral   Result Value Ref Range    Blood Culture Result No Growth 3 Days N/A         Imaging: Reviewed in Epic.    Medications:    metRONIDAZOLE  500 mg Intravenous Q12H    oxyCODONE  10 mg Oral Q8H INNA    ceFAZolin  2 g Intravenous Q8H    baclofen  5 mg Oral BID    vancomycin  125 mg Oral Daily    apixaban  5 mg Oral BID    OLANZapine  5 mg Oral Nightly    pantoprazole  40 mg Oral Before breakfast    pregabalin  25 mg Oral BID    sertraline  200 mg Oral Daily       Assessment & Plan:      # Klebsiella pneumonia sepsis on admission with transient septic shock on 10/1/2024  #Abdominal pain, possible acalculous cholecystitis  -ESR, CRP elevated on admission  -On IV antibiotics-was on IV Zosyn on 9/30/2024, then on IV Rocephin,  which was then changed to IV Ancef per ID based on sensitivity  - transferred to ICU by my colleague Dr. Arshad on 10/1/2024 for hypotension and was on pressor support with Levophed.  ABG with lactate done on 10/1/2024 at the time of transfer to ICU showed lactic acid of  1.1.  Patient received 500 cc of normal saline bolus at the time of transfer to ICU on 10/1/2024, did not receive full sepsis bolus as patient already hypoxic requiring 2 L of oxygen by nasal cannula at that time. Patient did not receive 30ml/kg of fluids despite having: hypotension. The reason for doing so is: a concern for fluid overload. 500mL of fluids were given instead   - blood pressure improved on 10/2/2024 and weaned off of Levophed pressor support on a.m. of 10/2/2024.  Seen by palliative care -patient DNAR/selective treatment  US abdomen Doppler done on 10/1/2024 with normal zeinab in hepatic and portal veins.  Ultrasound abdomen done on 10/2/2024 shows trace perihepatic ascites and fluid demonstrating loculation and septations.  No significant fluid elsewhere within the abdomen.  GI and ID following.    HIDA scan positive -surgeon on consult ; status post IR cholecystostomy tube done by IR Dr. Mendez Myles MD on 10/4/2024     #Metastatic esophageal cancer with previous resection and gastroesophageostomy  # History of prior bronchoesophageal fistula  # pancreatic mets  #Liver and pulmonary mets  # Anemia secondary to chemotherapy and malignancy  - oncology following, has had outpatient chemotherapy     #H/o PE/DVT  Eliquis     #LE swelling, likely 3rd spacing, hypoalbuminemia     #Hypokalemia, replace PRN     #Cancer related pain, per palliative  ?celiac block for pain control was considered by GI during last admission according to patient  GI following in hospital     #HTN, controlled     #DL, statin    #PUD/GERD, PPi     #CAD with prev CABG    #Depression/anxiety, home meds resumed    #TREVOR, due to above, ATN, start gentle hydration/albumin, monitor    #Coagulopathy, due to liver mets, was also on DOAC    #Leukocytosis due to Klebsiella pneumoniae sepsis present on admission  -On IV antibiotics  -Follow CBC in a.m.     #Severe malnutrition-dietitian following     Discussed with patient, patient's wife at  bedside.  Discussed with RN      Sakina Hernandez MD  10/5/2024        Supplementary Documentation:     Quality:  DVT Mechanical Prophylaxis:   SCDs, Early ambuation  DVT Pharmacologic Prophylaxis   Medication    heparin (Porcine) 100 Units/mL lock flush 500 Units    apixaban (Eliquis) tab 5 mg                Code Status: DNAR/Selective Treatment  Neumann: No urinary catheter in place  Neumann Duration (in days):   Central line:    SHUBHAM:     Discharge is dependent on: progress  At this point Mr. Cortez is expected to be discharge to: home    The 21st Century Cures Act makes medical notes like these available to patients in the interest of transparency. Please be advised this is a medical document. Medical documents are intended to carry relevant information, facts as evident, and the clinical opinion of the practitioner. The medical note is intended as peer to peer communication and may appear blunt or direct. It is written in medical language and may contain abbreviations or verbiage that are unfamiliar.     Dietitian Malnutrition Assessment    Evaluation for Malnutrition: Criteria for severe malnutrition diagnosis- chronic illness related to   Wt loss greater than 5% in 1 month., Body fat severe depletion., Muscle mass severe depletion.               RD Malnutrition Care Plan: Adjusted diet to least restrictive to maximize intake., Encouraged increased PO intake., Encouraged small frequent meals with emphasis on high calorie/high protein.    Body Fat/Muscle Mass:          Physician Assessment     Patient has a diagnosis of severe malnutrition

## 2024-10-06 LAB
ALBUMIN SERPL-MCNC: 2.5 G/DL (ref 3.2–4.8)
ALBUMIN/GLOB SERPL: 1 {RATIO} (ref 1–2)
ALP LIVER SERPL-CCNC: 160 U/L
ALT SERPL-CCNC: 11 U/L
ANION GAP SERPL CALC-SCNC: 6 MMOL/L (ref 0–18)
AST SERPL-CCNC: 28 U/L (ref ?–34)
BASOPHILS # BLD: 0 X10(3) UL (ref 0–0.2)
BASOPHILS NFR BLD: 0 %
BILIRUB SERPL-MCNC: 0.9 MG/DL (ref 0.3–1.2)
BUN BLD-MCNC: 15 MG/DL (ref 9–23)
CALCIUM BLD-MCNC: 8.3 MG/DL (ref 8.7–10.4)
CHLORIDE SERPL-SCNC: 104 MMOL/L (ref 98–112)
CO2 SERPL-SCNC: 27 MMOL/L (ref 21–32)
CREAT BLD-MCNC: 0.44 MG/DL
EGFRCR SERPLBLD CKD-EPI 2021: 126 ML/MIN/1.73M2 (ref 60–?)
EOSINOPHIL # BLD: 0 X10(3) UL (ref 0–0.7)
EOSINOPHIL NFR BLD: 0 %
ERYTHROCYTE [DISTWIDTH] IN BLOOD BY AUTOMATED COUNT: 14.8 %
GLOBULIN PLAS-MCNC: 2.6 G/DL (ref 2–3.5)
GLUCOSE BLD-MCNC: 110 MG/DL (ref 70–99)
HCT VFR BLD AUTO: 27.1 %
HGB BLD-MCNC: 8.6 G/DL
LYMPHOCYTES NFR BLD: 0.12 X10(3) UL (ref 1–4)
LYMPHOCYTES NFR BLD: 1 %
MCH RBC QN AUTO: 29.8 PG (ref 26–34)
MCHC RBC AUTO-ENTMCNC: 31.7 G/DL (ref 31–37)
MCV RBC AUTO: 93.8 FL
MONOCYTES # BLD: 0.73 X10(3) UL (ref 0.1–1)
MONOCYTES NFR BLD: 6 %
MORPHOLOGY: NORMAL
NEUTROPHILS # BLD AUTO: 10.66 X10 (3) UL (ref 1.5–7.7)
NEUTROPHILS NFR BLD: 92 %
NEUTS BAND NFR BLD: 1 %
NEUTS HYPERSEG # BLD: 11.25 X10(3) UL (ref 1.5–7.7)
OSMOLALITY SERPL CALC.SUM OF ELEC: 285 MOSM/KG (ref 275–295)
PLATELET # BLD AUTO: 184 10(3)UL (ref 150–450)
PLATELET MORPHOLOGY: NORMAL
POTASSIUM SERPL-SCNC: 4.3 MMOL/L (ref 3.5–5.1)
POTASSIUM SERPL-SCNC: 4.3 MMOL/L (ref 3.5–5.1)
PROCALCITONIN SERPL-MCNC: 0.33 NG/ML (ref ?–0.05)
PROT SERPL-MCNC: 5.1 G/DL (ref 5.7–8.2)
RBC # BLD AUTO: 2.89 X10(6)UL
SODIUM SERPL-SCNC: 137 MMOL/L (ref 136–145)
TOTAL CELLS COUNTED BLD: 100
WBC # BLD AUTO: 12.1 X10(3) UL (ref 4–11)

## 2024-10-06 PROCEDURE — 99232 SBSQ HOSP IP/OBS MODERATE 35: CPT | Performed by: STUDENT IN AN ORGANIZED HEALTH CARE EDUCATION/TRAINING PROGRAM

## 2024-10-06 PROCEDURE — 99233 SBSQ HOSP IP/OBS HIGH 50: CPT | Performed by: INTERNAL MEDICINE

## 2024-10-06 PROCEDURE — 99232 SBSQ HOSP IP/OBS MODERATE 35: CPT | Performed by: INTERNAL MEDICINE

## 2024-10-06 RX ORDER — FUROSEMIDE 10 MG/ML
40 INJECTION INTRAMUSCULAR; INTRAVENOUS ONCE
Status: COMPLETED | OUTPATIENT
Start: 2024-10-06 | End: 2024-10-06

## 2024-10-06 RX ORDER — CALCIUM CARBONATE 500 MG/1
500 TABLET, CHEWABLE ORAL 3 TIMES DAILY PRN
Status: DISCONTINUED | OUTPATIENT
Start: 2024-10-06 | End: 2024-10-12

## 2024-10-06 RX ORDER — FLUCONAZOLE 2 MG/ML
400 INJECTION, SOLUTION INTRAVENOUS EVERY 24 HOURS
Status: DISCONTINUED | OUTPATIENT
Start: 2024-10-06 | End: 2024-10-07

## 2024-10-06 NOTE — PROGRESS NOTES
[facility]   part of [facility]     Hospitalist Progress Note     Dontae Buddy Cortez . Patient Status:  Inpatient    10/15/1970 MRN [id number]   Location [place] 4NW-A Attending Nixon Vergara MD   Hosp Day # 6 PCP Adrian Horowitz MD     Chief Complaint: abdominal pain    Subjective:      Status post cholecystostomy tube placed on 10/4/2024 right upper quadrant abdominal pain improving to 7 out of 10 pain today.  No nausea vomiting.  CT chest done on 10/5/2024 shows worsening fluid collection along the surface of the right lobe of the liver, worsening left greater than right groundglass opacity, small bilateral pleural effusion and atelectasis of the right lung base    Objective:    Review of Systems:   A comprehensive review of systems was completed; pertinent positive and negatives stated in subjective.    Vital signs:  Temp:  [98.5 °F (36.9 °C)-98.7 °F (37.1 °C)] 98.5 °F (36.9 °C)  Pulse:  [62-78] 66  Resp:  [18-19] 19  BP: (136-152)/(75-82) 136/78  SpO2:  [91 %-97 %] 94 %    Physical Exam:    /78 (BP Location: Right arm)   Pulse 66   Temp 98.5 °F (36.9 °C) (Oral)   Resp 19   Ht 6' 2\" (1.88 m)   Wt 172 lb 2.9 oz (78.1 kg)   SpO2 94%   BMI 22.11 kg/m²     General: No acute distress  Respiratory: No wheezes, no rhonchi  Cardiovascular: S1, S2, regular rate and rhythm  Abdomen: Soft, right upper quadrant tenderness, right-sided percutaneous cholecystostomy tube present draining bilious drainage, non-distended, positive bowel sounds  Neuro: No new focal deficits.   Extremities: No edema        Diagnostic Data:    Labs:  Recent Labs   Lab 10/01/24  0811 10/02/24  0431 10/03/24  0522 10/03/24  0743 10/04/24  0630 10/04/24  1257 10/05/24  0618 10/06/24  0623   WBC  --  14.2* 9.3  --  10.2  --  10.4 12.1*   HGB  --  8.7* 8.2*  --  8.3*  --  8.0* 8.6*   MCV  --  92.0 94.1  --  90.7  --  92.5 93.8   PLT  --  203.0 156.0  --  171.0  --  163.0 184.0   BAND  --   --   --   --   --   --  1 1    INR 4.19* 2.60*  --  2.02* 2.20* 2.15*  --   --        Recent Labs   Lab 10/04/24  0630 10/05/24  0619 10/06/24  0623   * 91 110*   BUN 21 15 15   CREATSERUM 0.49* 0.44* 0.44*   CA 8.2* 8.1* 8.3*   ALB 2.6* 2.5* 2.5*    138 137   K 4.0  4.0 3.7 4.3  4.3    105 104   CO2 27.0 28.0 27.0   ALKPHO 176* 165* 160*   AST 50* 32 28   ALT 27 16 11   BILT 1.0 1.0 0.9   TP 5.0* 4.9* 5.1*       Estimated Creatinine Clearance: 212 mL/min (A) (based on SCr of 0.44 mg/dL (L)).    No results for input(s): \"TROP\", \"TROPHS\", \"CK\" in the last 168 hours.    Recent Labs   Lab 10/03/24  0743 10/04/24  0630 10/04/24  1257   PTP 23.0* 24.6* 24.2*   INR 2.02* 2.20* 2.15*                  Microbiology    Hospital Encounter on 09/30/24   1. Aerobic Bacterial Culture     Status: Abnormal (Preliminary result)    Collection Time: 10/04/24  2:40 PM    Specimen: Bile; Body fluid, unspecified   Result Value Ref Range    Aerobic Culture Result 1+ growth Klebsiella pneumoniae (A) N/A    Aerobic Culture Result 4+ growth Candida tropicalis (A) N/A    Aerobic Smear No WBCs seen N/A    Aerobic Smear No organisms seen N/A   2. Blood Culture     Status: None (Preliminary result)    Collection Time: 10/01/24 12:19 PM    Specimen: Blood,peripheral   Result Value Ref Range    Blood Culture Result No Growth 4 Days N/A         Imaging: Reviewed in Epic.   CT chest done on 10/5/2024 shows worsening fluid collection along the surface of the right lobe of the liver, worsening left greater than right groundglass opacity, small bilateral pleural effusion and atelectasis of the right lung base    Medications:    metRONIDAZOLE  500 mg Intravenous Q12H    oxyCODONE  10 mg Oral Q8H INNA    ceFAZolin  2 g Intravenous Q8H    baclofen  5 mg Oral BID    vancomycin  125 mg Oral Daily    apixaban  5 mg Oral BID    OLANZapine  5 mg Oral Nightly    pantoprazole  40 mg Oral Before breakfast    pregabalin  25 mg Oral BID    sertraline  200 mg Oral Daily        Assessment & Plan:      # Klebsiella pneumonia sepsis on admission with transient septic shock on 10/1/2024  #Abdominal pain, possible acalculous cholecystitis  -ESR, CRP elevated on admission  -On IV antibiotics-was on IV Zosyn on 9/30/2024, then on IV Rocephin,  which was then changed to IV Ancef per ID based on sensitivity  - transferred to ICU by my colleague Dr. Arshad on 10/1/2024 for hypotension and was on pressor support with Levophed.  ABG with lactate done on 10/1/2024 at the time of transfer to ICU showed lactic acid of 1.1.  Patient received 500 cc of normal saline bolus at the time of transfer to ICU on 10/1/2024, did not receive full sepsis bolus as patient already hypoxic requiring 2 L of oxygen by nasal cannula at that time. Patient did not receive 30ml/kg of fluids despite having: hypotension. The reason for doing so is: a concern for fluid overload. 500mL of fluids were given instead   - blood pressure improved on 10/2/2024 and weaned off of Levophed pressor support on a.m. of 10/2/2024.  Seen by palliative care -patient DNAR/selective treatment  US abdomen Doppler done on 10/1/2024 with normal zeinab in hepatic and portal veins.  Ultrasound abdomen done on 10/2/2024 shows trace perihepatic ascites and fluid demonstrating loculation and septations.  No significant fluid elsewhere within the abdomen.  GI and ID following.    HIDA scan positive -surgeon on consult ; status post IR cholecystostomy tube done by IR Dr. Mendez Myles MD on 10/4/2024  - CT chest done on 10/5/2024 shows worsening fluid collection along the surface of the right lobe of the liver, worsening left greater than right groundglass opacity, small bilateral pleural effusion and atelectasis of the right lung base.  Pulmonary following      #Metastatic esophageal cancer with previous resection and gastroesophageostomy  # History of prior bronchoesophageal fistula  # pancreatic mets  #Liver and pulmonary mets  # Anemia secondary  to chemotherapy and malignancy  - oncology following, has had outpatient chemotherapy     #H/o PE/DVT  Eliquis     #LE swelling, likely 3rd spacing, hypoalbuminemia     #Hypokalemia, replace PRN     #Cancer related pain, per palliative  ?celiac block for pain control was considered by GI during last admission according to patient  GI following in hospital     #HTN, controlled     #DL, statin    #PUD/GERD, PPi     #CAD with prev CABG    #Depression/anxiety, home meds resumed    #TREVOR, due to above, ATN, start gentle hydration/albumin, monitor    #Coagulopathy, due to liver mets, was also on DOAC    #Leukocytosis due to Klebsiella pneumoniae sepsis present on admission  -On IV antibiotics  -Follow CBC in a.m.     #Severe malnutrition-dietitian following     Discussed with patient.  Discussed with RN      Sakina Hernandez MD  10/6/2024          Supplementary Documentation:     Quality:  DVT Mechanical Prophylaxis:   SCDs, Early ambuation  DVT Pharmacologic Prophylaxis   Medication    heparin (Porcine) 100 Units/mL lock flush 500 Units    apixaban (Eliquis) tab 5 mg                Code Status: DNAR/Selective Treatment  Neumann: No urinary catheter in place  Neumann Duration (in days):   Central line:    SHUBHAM:     Discharge is dependent on: progress  At this point Mr. Cortez is expected to be discharge to: home    The 21st Century Cures Act makes medical notes like these available to patients in the interest of transparency. Please be advised this is a medical document. Medical documents are intended to carry relevant information, facts as evident, and the clinical opinion of the practitioner. The medical note is intended as peer to peer communication and may appear blunt or direct. It is written in medical language and may contain abbreviations or verbiage that are unfamiliar.     Dietitian Malnutrition Assessment    Evaluation for Malnutrition: Criteria for severe malnutrition diagnosis- chronic illness related to   Wt loss  greater than 5% in 1 month., Body fat severe depletion., Muscle mass severe depletion.               RD Malnutrition Care Plan: Adjusted diet to least restrictive to maximize intake., Encouraged increased PO intake., Encouraged small frequent meals with emphasis on high calorie/high protein.    Body Fat/Muscle Mass:          Physician Assessment     Patient has a diagnosis of severe malnutrition

## 2024-10-06 NOTE — PROGRESS NOTES
Pt A&Ox4 on 5-6L high flow nasal cannula. VSS. Having pain to the abdomen, prn pain medication given Q2hr as well as scheduled pain medication. Tolerating medications well per mar. Cholecystomy drain in place. Call light within reach, frequent checks made, needs met.

## 2024-10-06 NOTE — PROGRESS NOTES
[facility]  Progress Note    Dontae Cortez  Patient Status:  Inpatient    10/15/1970 MRN [id number]   Location [facility] 4NW-A Attending Sakina Hernandez MD   Hosp Day # 6 PCP Adrian Horowitz MD     Subjective:  He is seen and examined resting in bed. He reports shortness of breath with exertion. He reports his abdominal pain is improving. He denies nausea or vomiting.     CT chest completed yesterday revealed worsening left greater than right atypical infection/pneumonia. There are small bilateral pleural effusions and atelectasis at right lung base. There is worsening fluid collection along the surface of the right lobe of the liver.    Worsening leukocytosis, WBC 10.4--> 12.1. Hemoglobin improved 8.0 -->8.6    Objective/Physical Exam:  /82 (BP Location: Right arm)   Pulse 62   Temp 98.6 °F (37 °C) (Oral)   Resp 18   Ht 74\"   Wt 172 lb 2.9 oz (78.1 kg)   SpO2 97%   BMI 22.11 kg/m²     Intake/Output Summary (Last 24 hours) at 10/6/2024 0920  Last data filed at 10/6/2024 0600  Gross per 24 hour   Intake --   Output 40 ml   Net -40 ml         General: Awake, Alert, Cooperative.  No apparent distress.  HEENT: Normocephalic, atraumatic. No scleral icterus.   Pulm: no respiratory distress, no increased work of breathing. NC in place.   Abdomen:  Soft, non-distended,tender to palpation in right upper quadrant, with no rebound or guarding.  No peritoneal signs.  Drains: IR drain in place    Labs:  Lab Results   Component Value Date    WBC 12.1 10/06/2024    HGB 8.6 10/06/2024    HCT 27.1 10/06/2024    .0 10/06/2024      Lab Results   Component Value Date    PT 20.4 (H) 2014    PT 22.9 (H) 2014    PT 25.7 (H) 2014    INR 2.15 (H) 10/04/2024    INR 2.20 (H) 10/04/2024    INR 2.02 (H) 10/03/2024     Lab Results   Component Value Date     10/06/2024    K 4.3 10/06/2024    K 4.3 10/06/2024     10/06/2024    CO2 27.0 10/06/2024    BUN 15 10/06/2024     CREATSERUM 0.44 10/06/2024     10/06/2024    CA 8.3 10/06/2024    ALKPHO 160 10/06/2024    ALT 11 10/06/2024    AST 28 10/06/2024    BILT 0.9 10/06/2024    ALB 2.5 10/06/2024    TP 5.1 10/06/2024            Assessment:  Patient Active Problem List   Diagnosis    Primary hypertension    Hyperlipidemia with target low density lipoprotein (LDL) cholesterol less than 70 mg/dL    Coronary artery disease involving coronary bypass graft of native heart with angina pectoris (HCC)    S/P CABG x 4    Nontoxic multinodular goiter    Pulmonary nodules    Thrombophlebitis leg    BRANDAN (generalized anxiety disorder)    Right shoulder Arthroscopy acromioplasty ,distal  clavicle resection, rotator cuff/labral debridment  Global 05/13/2021    Right bicipital tenosynovitis    Malignant neoplasm of esophagus (HCC)    Pleural effusion    Esophageal anastomotic leak    Esophageal obstruction    On total parenteral nutrition (TPN)    Mechanical complication of esophagostomy (HCC)    Abdominal pain of unknown etiology    Migration of esophageal stent    Gastroparesis    Esophageal carcinoma (HCC)    Normocytic anemia    Esophageal fistula    Subacute cough    Acquired bronchoesophageal fistula (HCC)    Pneumonia of both lower lobes due to infectious organism    Malignant neoplasm of pancreas (HCC)    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 difficile colitis    Dysphagia, unspecified type    Dyspnea and respiratory abnormalities    Hypokalemia    Dysphagia    Narcotic drug use    History of esophageal cancer    Palliative care by specialist    Neoplasm related pain    Endobronchial mass    Hyponatremia    Thrombocytopenia (HCC)     Acute kidney injury (HCC)    Hyperglycemia    Aspiration pneumonitis (HCC)    C. difficile diarrhea    Aspiration pneumonia (HCC)    Malignant neoplasm metastatic to liver (HCC)    Hyperlipidemia    Nausea vomiting and diarrhea    Fistula, bronchoesophageal (HCC)    Iron deficiency anemia, unspecified iron deficiency anemia type    Azotemia    Palliative care encounter    Goals of care, counseling/discussion    Cancer related pain    Anemia    Fistula    Acute cough    Pneumonia of right lung due to infectious organism, unspecified part of lung    Bacteremia    Acute postoperative pain    Exocrine pancreatic insufficiency (HCC)    Hypertriglyceridemia    Acute pulmonary embolism without acute cor pulmonale, unspecified pulmonary embolism type (HCC)    Acute deep vein thrombosis (DVT) of popliteal vein of right lower extremity (HCC)    Altered mental status, unspecified altered mental status type    Transaminitis    Biliary obstruction (HCC)    Hyperbilirubinemia    Esophageal adenocarcinoma (HCC)    Cancer associated pain    Abdominal pain, acute    Abnormal CT of the chest    Lower extremity edema    Abnormal finding of diagnostic imaging    Metastatic adenocarcinoma to esophagus (HCC)    Klebsiella pneumoniae sepsis (HCC)    Septic shock (HCC)    Acalculous cholecystitis    Severe malnutrition (HCC)    Metastatic adenocarcinoma (HCC)    Acute cholecystitis       PPD 2 IR cholecystotomy tube placement  Acute acalculous cholecystitis  Esophageal cancer with metastases to the liver    Plan:  Continue Diet as tolerated  Continue IR drain care; management per IR  Continue IV antibiotics per ID recommendations  Analgesics and antiemetics as needed  Encourage ambulation and up to chair  Encourage incentive spirometer   Medical management per primary  DVT prophylaxis with eliquis  GI prophylaxis with protonix       Cheryl Fine PA-C  10/6/2024  9:20 AM    This note was initiated by Cheryl Fine PA-C.  The PA saw the  patient in conjunction with me. The PA performed a history, exam, and developed the assessment and plan. I agree with the mentioned assessment and plan and have provided further documentation of my own, if necessary.    Patient is continuing to have right sided abdominal pain but feels this is improving.  Current issue is at the patient is more short of breath and became severely fatigued after brushing his teeth this morning.    Patient is awake and alert, sitting in his hospital bed and answering questions appropriately.  He appears comfortable at rest.  His abdomen is soft, nondistended and tender to palpation along the right side, especially in the right upper quadrant.  The cholecystostomy tube is connected to a gravity bag and draining bilious fluid.    CT chest obtained yesterday and does note worsening fluid around the liver.  Imaging reviewed with radiology and there is a loculated ascites which is a bit worse compared to prior imaging.  The density this fluid appears consistent with simple serous fluid.    I suspect that the worsening fluid around the liver is not a source of infection and may just be a result of his known, progressing liver metastasis.  That being said, the fluid could be sampled or the drained per IR if this is thought to be a possible source of infection.    Okay for diet as tolerated from surgical standpoint. Continue antibiotics per infectious disease recommendations.  Remaining care per hospitalist and other medical consultants.  Surgery will continue to follow. Please contact surgery with any further questions or concerns.    Jd Burroughs MD  [facility] General [facility]

## 2024-10-06 NOTE — PLAN OF CARE
Problem: CARDIOVASCULAR - ADULT  Goal: Maintains optimal cardiac output and hemodynamic stability  Description: INTERVENTIONS:  - Monitor vital signs, rhythm, and trends  - Monitor for bleeding, hypotension and signs of decreased cardiac output  - Evaluate effectiveness of vasoactive medications to optimize hemodynamic stability  - Monitor arterial and/or venous puncture sites for bleeding and/or hematoma  - Assess quality of pulses, skin color and temperature  - Assess for signs of decreased coronary artery perfusion - ex. Angina  - Evaluate fluid balance, assess for edema, trend weights  Outcome: Progressing     Problem: RESPIRATORY - ADULT  Goal: Achieves optimal ventilation and oxygenation  Description: INTERVENTIONS:  - Assess for changes in respiratory status  - Assess for changes in mentation and behavior  - Position to facilitate oxygenation and minimize respiratory effort  - Oxygen supplementation based on oxygen saturation or ABGs  - Provide Smoking Cessation handout, if applicable  - Encourage broncho-pulmonary hygiene including cough, deep breathe, Incentive Spirometry  - Assess the need for suctioning and perform as needed  - Assess and instruct to report SOB or any respiratory difficulty  - Respiratory Therapy support as indicated  - Manage/alleviate anxiety  - Monitor for signs/symptoms of CO2 retention  Outcome: Progressing     Problem: GASTROINTESTINAL - ADULT  Goal: Maintains or returns to baseline bowel function  Description: INTERVENTIONS:  - Assess bowel function  - Maintain adequate hydration with IV or PO as ordered and tolerated  - Evaluate effectiveness of GI medications  - Encourage mobilization and activity  - Obtain nutritional consult as needed  - Establish a toileting routine/schedule  - Consider collaborating with pharmacy to review patient's medication profile  Outcome: Progressing     Problem: METABOLIC/FLUID AND ELECTROLYTES - ADULT  Goal: Hemodynamic stability and optimal renal  function maintained  Description: INTERVENTIONS:  - Monitor labs and assess for signs and symptoms of volume excess or deficit  - Monitor intake, output and patient weight  - Monitor urine specific gravity, serum osmolarity and serum sodium as indicated or ordered  - Monitor response to interventions for patient's volume status, including labs, urine output, blood pressure (other measures as available)  - Encourage oral intake as appropriate  - Instruct patient on fluid and nutrition restrictions as appropriate  Outcome: Progressing     Problem: SKIN/TISSUE INTEGRITY - ADULT  Goal: Skin integrity remains intact  Description: INTERVENTIONS  - Assess and document risk factors for pressure ulcer development  - Assess and document skin integrity  - Monitor for areas of redness and/or skin breakdown  - Initiate interventions, skin care algorithm/standards of care as needed  Outcome: Progressing  Goal: Incision(s), wounds(s) or drain site(s) healing without S/S of infection  Description: INTERVENTIONS:  - Assess and document risk factors for pressure ulcer development  - Assess and document skin integrity  - Assess and document dressing/incision, wound bed, drain sites and surrounding tissue  - Implement wound care per orders  - Initiate isolation precautions as appropriate  - Initiate Pressure Ulcer prevention bundle as indicated  Outcome: Progressing     Problem: HEMATOLOGIC - ADULT  Goal: Maintains hematologic stability  Description: INTERVENTIONS  - Assess for signs and symptoms of bleeding or hemorrhage  - Monitor labs and vital signs for trends  - Administer supportive blood products/factors, fluids and medications as ordered and appropriate  - Administer supportive blood products/factors as ordered and appropriate  Outcome: Progressing  Goal: Free from bleeding injury  Description: (Example usage: patient with low platelets)  INTERVENTIONS:  - Avoid intramuscular injections, enemas and rectal medication  administration  - Ensure safe mobilization of patient  - Hold pressure on venipuncture sites to achieve adequate hemostasis  - Assess for signs and symptoms of internal bleeding  - Monitor lab trends  - Patient is to report abnormal signs of bleeding to staff  - Avoid use of toothpicks and dental floss  - Use electric shaver for shaving  - Use soft bristle tooth brush  - Limit straining and forceful nose blowing  Outcome: Progressing     Problem: MUSCULOSKELETAL - ADULT  Goal: Return mobility to safest level of function  Description: INTERVENTIONS:  - Assess patient stability and activity tolerance for standing, transferring and ambulating w/ or w/o assistive devices  - Assist with transfers and ambulation using safe patient handling equipment as needed  - Ensure adequate protection for wounds/incisions during mobilization  - Obtain PT/OT consults as needed  - Advance activity as appropriate  - Communicate ordered activity level and limitations with patient/family  Outcome: Progressing     Problem: PAIN - ADULT  Goal: Verbalizes/displays adequate comfort level or patient's stated pain goal  Description: INTERVENTIONS:  - Encourage pt to monitor pain and request assistance  - Assess pain using appropriate pain scale  - Administer analgesics based on type and severity of pain and evaluate response  - Implement non-pharmacological measures as appropriate and evaluate response  - Consider cultural and social influences on pain and pain management  - Manage/alleviate anxiety  - Utilize distraction and/or relaxation techniques  - Monitor for opioid side effects  - Notify MD/LIP if interventions unsuccessful or patient reports new pain  - Anticipate increased pain with activity and pre-medicate as appropriate  Outcome: Progressing     Problem: SAFETY ADULT - FALL  Goal: Free from fall injury  Description: INTERVENTIONS:  - Assess pt frequently for physical needs  - Identify cognitive and physical deficits and behaviors  that affect risk of falls.  - [place] fall precautions as indicated by assessment.  - Educate pt/family on patient safety including physical limitations  - Instruct pt to call for assistance with activity based on assessment  - Modify environment to reduce risk of injury  - Provide assistive devices as appropriate  - Consider OT/PT consult to assist with strengthening/mobility  - Encourage toileting schedule  Outcome: Progressing     Problem: Patient/Family Goals  Goal: Patient/Family Long Term Goal  Description: Patient's Long Term Goal: return home    Interventions:  - pulmonary toilet (coughing/deep breathing/incentive spirometer and flutter valve 10x hourly while awake  - See additional Care Plan goals for specific interventions  Outcome: Progressing  Goal: Patient/Family Short Term Goal  Description: Patient's Short Term Goal: wean levophed    Interventions:   - allow for frequent monitoring of BP  - anitbiotics  - See additional Care Plan goals for specific interventions  Outcome: Progressing

## 2024-10-06 NOTE — PROGRESS NOTES
[facility] [place]  Pulmonary/Critical Care Consult note    Dontae Buddy Ortegacristina Champion Patient Status:  Inpatient    10/15/1970 MRN [id number]   Location [facility] 4NW-A Attending Nixon Vergara MD   Hosp Day # 6 PCP Adrian Horowitz MD       History of Present Illness:    Reports being short of breath walking to the bathroom.  Requiring supplemental oxygen 6 L/min via high flow nasal cannula and oxygen saturations are 93 to 96%    History:  Past Medical History:    Back problem    Belching    Black stools    Borderline diabetes    Dx in 2013 - HgA1C 6.2%    C. difficile diarrhea    pt treated and without symptoms    Chest pain    Coronary artery disease    On 13: CABG x 4 with LIMA to LAD and SVG to diagonal, OM and PDA    Decorative tattoo    Depression    Difficult intubation    h/o esophagectomy for CA; developed esophagobronchial vistula    Disorder of liver    LIVER CA    Esophageal cancer (HCC)    completed chemo    Esophageal reflux    Essential hypertension    Exposure to medical diagnostic radiation    last tx 2022    Frequent urination    Gastroparesis    Heartburn    High blood pressure    High cholesterol    Found when I had quadruple bypass    History of COVID-19    asymptomatic - pt was dx during a hospitalization for another diagnosis. No continued symptoms    History of stomach ulcers    Hyperlipidemia    Hyperlipidemia LDL goal < 70    Indigestion    Morbid obesity with BMI of 40.0-44.9, adult (HCC)    Muscle weakness    Nausea vomiting and diarrhea    Nontoxic multinodular goiter    Dx in 2013: pt was told that imaging showed thyroid cysts per PCP    Pancreatic cancer (HCC)    last dose 2023 is scheduled for another round 23    Peripheral vascular disease (HCC)    pt denies    Personal history of antineoplastic chemotherapy    for esophageal cancer/completed    Personal history of antineoplastic chemotherapy    pancreatic cancer    Personal history of antineoplastic  chemotherapy    Last treatment 3/12/24    Problems with swallowing    Pulmonary nodules    Dx in 8/2013: CT chest showed small bilateral fissural-based lung nodules less than 1 cm    S/P CABG x 4    On 8/16/13: CABG x 4 with LIMA to LAD and SVG to diagonal, OM and PDA    Shortness of breath    when coughing; no oxygen    Vomiting     Past Surgical History:   Procedure Laterality Date    Appendectomy      Appendectomy      Arthroscopy of joint unlisted      right shoulder    Cabg      On 8/16/13: CABG x 4 with LIMA to LAD and SVG to diagonal, OM and PDA    Cardiac cath lab      On 8/14/2013: cardiac cath showed 3-vessel disease    Other surgical history      1.       Laparoscopic robotic-assisted esophagogastrectomy.    Port, indwelling, imp       Family History   Problem Relation Age of Onset    Cancer Mother         breast and colon     Diabetes Neg       reports that he quit smoking about 11 years ago. His smoking use included cigarettes. He started smoking about 38 years ago. He has a 27 pack-year smoking history. He has never been exposed to tobacco smoke. He has never used smokeless tobacco. He reports that he does not drink alcohol and does not use drugs.    Allergies:  Allergies   Allergen Reactions    Oxaliplatin HIVES     Shaking        Medications:    Current Facility-Administered Medications:     calcium carbonate (Tums) chewable tab 500 mg, 500 mg, Oral, TID PRN    metRONIDAZOLE in sodium chloride 0.79% (Flagyl) 5 mg/mL IVPB premix 500 mg, 500 mg, Intravenous, Q12H    oxyCODONE immediate release tab 10 mg, 10 mg, Oral, Q4H PRN    oxyCODONE immediate release tab 10 mg, 10 mg, Oral, Q8H INNA    benzonatate (Tessalon) cap 100 mg, 100 mg, Oral, TID PRN    ipratropium-albuterol (Duoneb) 0.5-2.5 (3) MG/3ML inhalation solution 3 mL, 3 mL, Nebulization, Q6H PRN    ceFAZolin (Ancef) 2g in 10mL IV syringe premix, 2 g, Intravenous, Q8H    baclofen (Lioresal) tab 5 mg, 5 mg, Oral, BID    vancomycin (Vancocin) cap 125  mg, 125 mg, Oral, Daily    heparin (Porcine) 100 Units/mL lock flush 500 Units, 5 mL, Intravenous, PRN    apixaban (Eliquis) tab 5 mg, 5 mg, Oral, BID    OLANZapine (ZyPREXA) tab 5 mg, 5 mg, Oral, Nightly    pantoprazole (Protonix) DR tab 40 mg, 40 mg, Oral, Before breakfast    pregabalin (Lyrica) cap 25 mg, 25 mg, Oral, BID    sertraline (Zoloft) tab 200 mg, 200 mg, Oral, Daily    acetaminophen (Tylenol Extra Strength) tab 500 mg, 500 mg, Oral, Q4H PRN    melatonin tab 3 mg, 3 mg, Oral, Nightly PRN    glycerin-hypromellose- (Artificial Tears) 0.2-0.2-1 % ophthalmic solution 1 drop, 1 drop, Both Eyes, QID PRN    sodium chloride (Saline Mist) 0.65 % nasal solution 1 spray, 1 spray, Each Nare, Q3H PRN    ondansetron (Zofran) 4 MG/2ML injection 4 mg, 4 mg, Intravenous, Q6H PRN    prochlorperazine (Compazine) 10 MG/2ML injection 5 mg, 5 mg, Intravenous, Q8H PRN    [DISCONTINUED] HYDROmorphone (Dilaudid) 1 MG/ML injection 0.2 mg, 0.2 mg, Intravenous, Q2H PRN **OR** HYDROmorphone (Dilaudid) 1 MG/ML injection 0.4 mg, 0.4 mg, Intravenous, Q2H PRN **OR** [DISCONTINUED] HYDROmorphone (Dilaudid) 1 MG/ML injection 0.8 mg, 0.8 mg, Intravenous, Q2H PRN    Intake/Output:    Intake/Output Summary (Last 24 hours) at 10/6/2024 1234  Last data filed at 10/6/2024 0600  Gross per 24 hour   Intake --   Output 40 ml   Net -40 ml      Body mass index is 22.11 kg/m².    Review of Systems  Review of Systems:   A comprehensive 10 point review of systems was completed.  Pertinent positives and negatives noted in the the HPI.         Vitals:    10/05/24 1943 10/06/24 0000 10/06/24 0908 10/06/24 1100   BP: 152/82  143/75 136/78   BP Location: Right arm   Right arm   Pulse: 78 62 70 74   Resp: 18   19   Temp: 98.6 °F (37 °C)  98.7 °F (37.1 °C) 98.5 °F (36.9 °C)   TempSrc: Oral  Oral Oral   SpO2: 91% 97% 96% 93%   Weight:       Height:             Physical Exam  Constitutional:       General: He is not in acute distress.     Appearance:  Normal appearance. He is not ill-appearing or diaphoretic.   HENT:      Head: Normocephalic and atraumatic.      Nose: Nose normal. No congestion or rhinorrhea.      Mouth/Throat:      Mouth: Mucous membranes are moist.      Pharynx: Oropharynx is clear. No oropharyngeal exudate or posterior oropharyngeal erythema.   Eyes:      Extraocular Movements: Extraocular movements intact.      Pupils: Pupils are equal, round, and reactive to light.   Cardiovascular:      Rate and Rhythm: Normal rate.      Pulses: Normal pulses.      Heart sounds: Normal heart sounds. No murmur heard.  Pulmonary:      Effort: Pulmonary effort is normal. No respiratory distress.      Breath sounds: Normal breath sounds. No wheezing or rhonchi.      Comments: Diminished breath sounds in bilateral lower lobes.  Crackles in left lower lung base  Chest:      Chest wall: No tenderness.   Abdominal:      General: Abdomen is flat. Bowel sounds are normal.      Palpations: Abdomen is soft.      Comments: Mild abdominal pain on deep palpation   Musculoskeletal:         General: Normal range of motion.   Skin:     General: Skin is warm.   Neurological:      General: No focal deficit present.      Mental Status: He is alert and oriented to person, place, and time.   Psychiatric:         Mood and Affect: Mood normal.         Behavior: Behavior normal.         Thought Content: Thought content normal.         Judgment: Judgment normal.            Lab Data Review:  Recent Labs   Lab 10/04/24  0630 10/05/24  0618 10/06/24  0623   WBC 10.2 10.4 12.1*   HGB 8.3* 8.0* 8.6*   HCT 25.4* 24.5* 27.1*   .0 163.0 184.0   EOS  --  0 0       Recent Labs   Lab 10/04/24  0630 10/05/24  0619 10/06/24  0623    138 137   K 4.0  4.0 3.7 4.3  4.3    105 104   CO2 27.0 28.0 27.0   BUN 21 15 15   CREATSERUM 0.49* 0.44* 0.44*   CA 8.2* 8.1* 8.3*   ALB 2.6* 2.5* 2.5*   ALKPHO 176* 165* 160*   ALT 27 16 11   AST 50* 32 28   * 91 110*     Component  Ref  Range & Units 10/1/24 0625   Procalcitonin  <0.05 ng/mL 3.06 High      No results for input(s): \"MG\" in the last 168 hours.    Lab Results   Component Value Date    PHOS 2.9 03/15/2024        Recent Labs   Lab 10/03/24  0743 10/04/24  0630 10/04/24  1257   INR 2.02* 2.20* 2.15*       Recent Labs   Lab 10/01/24  1412   ABGPHT 7.40   BNLOEV5T 43   EPOKT7W 79*   ABGHCO3 26.1   ABGBE 1.6   TEMP 98.6   TACOS Positive   SITE Left Radial   DEV Nasal cannula   THGB 8.6*       No results for input(s): \"TROP\", \"CKMB\" in the last 168 hours.    Cultures:   Hospital Encounter on 09/30/24   1. Aerobic Bacterial Culture     Status: Abnormal (Preliminary result)    Collection Time: 10/04/24  2:40 PM    Specimen: Bile; Body fluid, unspecified   Result Value Ref Range    Aerobic Culture Result 1+ growth Klebsiella pneumoniae (A) N/A    Aerobic Culture Result 4+ growth Candida tropicalis (A) N/A    Aerobic Smear No WBCs seen N/A    Aerobic Smear No organisms seen N/A   2. Blood Culture     Status: None (Preliminary result)    Collection Time: 10/01/24 12:19 PM    Specimen: Blood,peripheral   Result Value Ref Range    Blood Culture Result No Growth 4 Days N/A           Radiology personally reviewed:  CT CHEST (CPT=71250)    Result Date: 10/5/2024  CONCLUSION:   1.  Overall worsening of left greater than right ground-glass opacity consistent with worsening atypical infection/pneumonia or inflammatory pneumonitis within the lungs.   2. New small bilateral pleural effusions and atelectasis at the right lung base.   3. Worsening fluid collection along the surface of the right lobe of the liver could represent bile collection.  4. Stable hepatic lesions may represent metastatic disease.   LOCATION:  [facility]   Dictated by (CST): Ovidio Patel MD on 10/05/2024 at 4:46 PM     Finalized by (CST): Ovidio Patel MD on 10/05/2024 at 4:52 PM       XR CHEST AP PORTABLE  (CPT=71045)    Result Date: 10/5/2024  CONCLUSION:  1. Worsening opacities  bilaterally, most predominant within the left mid to lower lung zone, suggestive of pneumonia. 2. Interstitial opacities likely representing edema. 3. Small bilateral pleural effusions.    LOCATION:  [facility]      Dictated by (CST): Jneny Navarrete MD on 10/05/2024 at 7:19 AM     Finalized by (CST): Jenny Navarrete MD on 10/05/2024 at 7:20 AM       IR CHOLECYSTOSTOMY    Result Date: 10/4/2024  CONCLUSION: Successful placement of 8.5 Fr percutaneous transhepatic cholecystostomy tube.   LOCATION:  [facility]       Dictated by (CST): Shar Simms MD on 10/04/2024 at 2:54 PM     Finalized by (CST): Shar Simms MD on 10/04/2024 at 2:56 PM         Patient Active Problem List   Diagnosis    Primary hypertension    Hyperlipidemia with target low density lipoprotein (LDL) cholesterol less than 70 mg/dL    Coronary artery disease involving coronary bypass graft of native heart with angina pectoris (HCC)    S/P CABG x 4    Nontoxic multinodular goiter    Pulmonary nodules    Thrombophlebitis leg    BRANDAN (generalized anxiety disorder)    Right shoulder Arthroscopy acromioplasty ,distal  clavicle resection, rotator cuff/labral debridment  Global 05/13/2021    Right bicipital tenosynovitis    Malignant neoplasm of esophagus (HCC)    Pleural effusion    Esophageal anastomotic leak    Esophageal obstruction    On total parenteral nutrition (TPN)    Mechanical complication of esophagostomy (HCC)    Abdominal pain of unknown etiology    Migration of esophageal stent    Gastroparesis    Esophageal carcinoma (HCC)    Normocytic anemia    Esophageal fistula    Subacute cough    Acquired bronchoesophageal fistula (HCC)    Pneumonia of both lower lobes due to infectious organism    Malignant neoplasm of pancreas (HCC)    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 difficile colitis    Dysphagia, unspecified type    Dyspnea and respiratory abnormalities    Hypokalemia    Dysphagia    Narcotic drug use    History of esophageal cancer    Palliative care by specialist    Neoplasm related pain    Endobronchial mass    Hyponatremia    Thrombocytopenia (HCC)    Acute kidney injury (HCC)    Hyperglycemia    Aspiration pneumonitis (HCC)    C. difficile diarrhea    Aspiration pneumonia (HCC)    Malignant neoplasm metastatic to liver (HCC)    Hyperlipidemia    Nausea vomiting and diarrhea    Fistula, bronchoesophageal (HCC)    Iron deficiency anemia, unspecified iron deficiency anemia type    Azotemia    Palliative care encounter    Goals of care, counseling/discussion    Cancer related pain    Anemia    Fistula    Acute cough    Pneumonia of right lung due to infectious organism, unspecified part of lung    Bacteremia    Acute postoperative pain    Exocrine pancreatic insufficiency (HCC)    Hypertriglyceridemia    Acute pulmonary embolism without acute cor pulmonale, unspecified pulmonary embolism type (HCC)    Acute deep vein thrombosis (DVT) of popliteal vein of right lower extremity (HCC)    Altered mental status, unspecified altered mental status type    Transaminitis    Biliary obstruction (HCC)    Hyperbilirubinemia    Esophageal adenocarcinoma (HCC)    Cancer associated pain    Abdominal pain, acute    Abnormal CT of the chest    Lower extremity edema    Abnormal finding of diagnostic imaging    Metastatic adenocarcinoma to esophagus (HCC)    Klebsiella pneumoniae sepsis (HCC)    Septic shock (HCC)    Acalculous cholecystitis    Severe malnutrition (HCC)    Metastatic adenocarcinoma (HCC)    Acute cholecystitis       Assessment:  Acute hypoxic respiratory failure: Required oxygen 6 L/min high flow nasal cannula   Pulmonary nodules  Klebsiella pneumoniae  bacteremia suspected due to acute cholecystitis:  Groundglass opacities on CT chest performed 9/30/2024 involving by lateral upper lobes and left lower lobe peripheral: This could represent atypical bacterial infection versus viral infection versus related to chemotherapy versus lymphangitic spread of cancer etc: Follow-up CT chest on 10/5/2024 shows worsening of bilateral left upper lobe left lower lobe and right lower lobe interstitial infiltrates: Procalcitonin minimally elevated: Solu-Medrol 60 mg IV x 1 given yesterday  Bilateral small pleural effusion  Nonocclusive pulmonary embolism and left upper lobe and left lower lobe branches of pulmonary artery  Mediastinal adenopathy with conglomeration of AP window lymph nodes 2.5 x 2.4 cm  Pancreatic cancer status postchemotherapy received paclitaxel 2 weeks ago  Depression, liver cancer,  GERD,   Dilated esophagus with pull-through procedure previously after esophageal cancer resection  Hypertension,  Hyperlipidemia,  Morbid obesity  History of pulmonary nodules.      Plan:  Wean FiO2 off to keep oxygen saturation between 90% to 92%  Cholecystostomy tube placement per interventional radiology today  Use flutter device every 4 hours as possible to help cough out secretions  Incentive spirometry every 4 hours as possible  DuoNebs every 6 hours as needed  Continue current antibiotics  Infectious disease follow-up  Lasix 40 mg IV x 1  Eliquis on hold  DVT prophylaxis: SCD boots   GI prophylaxis: ---  Will follow for further recommendations  Follow labs,CRP and chest x-ray in a.m.  Oncology follow-up    Thank You for allowing me to participate in this patient's care     Dusty Brooks MD    Note to the patient: The 21st Century Cures Act makes medical notes like these available to patients in the interest of transparency. However, be advised that this is a medical document. It is intended as peer to peer communication. It is written in medical language and may contain  abbreviations or verbiage that are unfamiliar. It may appear blunt or direct. Medical documents are intended to carry relevant information, facts as evident, and clinical opinion of the practitioner.      Disclaimer: Components of this note were documented using voice recognition system and are subject to errors not corrected at proofreading. Contact the author of this note for any clarifications

## 2024-10-06 NOTE — SPIRITUAL CARE NOTE
Spiritual Care Visit Note    Patient Name: Dontae Cortez Jr. Date of Spiritual Care Visit: 10/06/24   : 10/15/1970 Primary Dx: Abdominal pain, acute       Referred By:      Spiritual Care Taxonomy:    Intended Effects: Demonstrate caring and concern    Methods: Encourage self care;Offer emotional support;Collaborate with care team member    Interventions: Active listening;Ask guided questions;Explain  role    Visit Type/Summary:     - Spiritual Care: Consulted with RN prior to visit. Offered empathic listening and emotional support. Provided information regarding how to contact Spiritual Care and left a Spiritual Care information card.  remains available as needed for follow up.    Spiritual Care support can be requested via an Epic consult. For urgent/immediate needs, please contact the On Call  at: [facility]: ext [phone]           Resident  Cherelle Ma MA

## 2024-10-06 NOTE — PROGRESS NOTES
INFECTIOUS DISEASE  PROGRESS NOTE            [facility]    Dontae Buddy Diego Champion Patient Status:  Inpatient    10/15/1970 MRN [id number]   Location [facility] 4SW-A Attending Sakina Hernandez MD   Hosp Day # 6 PCP Adrian Horowitz MD     Antibiotics: #4  Cefazolin #3  Metronidazole #1    Subjective:  Still having some discomfort to Mary Kate site.  No fevers or chills.  Candida tropicalis from drain site    Objective:  Temp:  [98.5 °F (36.9 °C)-98.7 °F (37.1 °C)] 98.5 °F (36.9 °C)  Pulse:  [62-78] 66  Resp:  [18-19] 19  BP: (136-152)/(75-82) 136/78  SpO2:  [91 %-97 %] 94 %    General: awake alert  Vital signs:Temp:  [98.5 °F (36.9 °C)-98.7 °F (37.1 °C)] 98.5 °F (36.9 °C)  Pulse:  [62-78] 66  Resp:  [18-19] 19  BP: (136-152)/(75-82) 136/78  SpO2:  [91 %-97 %] 94 %  HEENT: Moist mucous membranes. Extraocular muscles are intact.  Neck: supple no masses  Respiratory: Non labored, symmetric excursion, normal respirations  Port intact  Cardiovascular: no irregularities in rhythm  Abdomen, non distended, was tender RUQ on yesterdays exam, deferred today  Musculoskeletal: joints: no swelling   Integument: No lesions. No erythema. No open wounds.  Labs:     COVID-19 Lab Results    COVID-19  Lab Results   Component Value Date    COVID19 Not Detected 2024    COVID19 Not Detected 2024    COVID19 Not Detected 2024       Pro-Calcitonin  Recent Labs   Lab 10/01/24  0625 10/06/24  0623   PCT 3.06* 0.33*       Cardiac  No results for input(s): \"TROP\", \"PBNP\" in the last 168 hours.    Creatinine Kinase  No results for input(s): \"CK\" in the last 168 hours.    Inflammatory Markers  Recent Labs   Lab 24  0637 10/02/24  1035   CRP 22.40*  --    MYAH  --  791*       Recent Labs   Lab 10/06/24  0623   RBC 2.89*   HGB 8.6*   HCT 27.1*   MCV 93.8   MCH 29.8   MCHC 31.7   RDW 14.8   NEPRELIM 10.66*   WBC 12.1*   .0   NEUT 92   LYMPH 1   MON 6   EOS 0         Recent Labs   Lab 10/04/24  0630  10/05/24  0619 10/06/24  0623   * 91 110*   BUN 21 15 15   CREATSERUM 0.49* 0.44* 0.44*   CA 8.2* 8.1* 8.3*   ALB 2.6* 2.5* 2.5*    138 137   K 4.0  4.0 3.7 4.3  4.3    105 104   CO2 27.0 28.0 27.0   ALKPHO 176* 165* 160*   AST 50* 32 28   ALT 27 16 11   BILT 1.0 1.0 0.9   TP 5.0* 4.9* 5.1*       No results found for: \"VANCT\"  Microbiology    Hospital Encounter on 09/30/24   1. Aerobic Bacterial Culture     Status: Abnormal (Preliminary result)    Collection Time: 10/04/24  2:40 PM    Specimen: Bile; Body fluid, unspecified   Result Value Ref Range    Aerobic Culture Result 1+ growth Klebsiella pneumoniae (A) N/A    Aerobic Culture Result 4+ growth Candida tropicalis (A) N/A    Aerobic Smear No WBCs seen N/A    Aerobic Smear No organisms seen N/A   2. Blood Culture     Status: None (Preliminary result)    Collection Time: 10/01/24 12:19 PM    Specimen: Blood,peripheral   Result Value Ref Range    Blood Culture Result No Growth 4 Days N/A         Problem list reviewed:  Patient Active Problem List   Diagnosis    Primary hypertension    Hyperlipidemia with target low density lipoprotein (LDL) cholesterol less than 70 mg/dL    Coronary artery disease involving coronary bypass graft of native heart with angina pectoris (HCC)    S/P CABG x 4    Nontoxic multinodular goiter    Pulmonary nodules    Thrombophlebitis leg    BRANDAN (generalized anxiety disorder)    Right shoulder Arthroscopy acromioplasty ,distal  clavicle resection, rotator cuff/labral debridment  Global 05/13/2021    Right bicipital tenosynovitis    Malignant neoplasm of esophagus (HCC)    Pleural effusion    Esophageal anastomotic leak    Esophageal obstruction    On total parenteral nutrition (TPN)    Mechanical complication of esophagostomy (HCC)    Abdominal pain of unknown etiology    Migration of esophageal stent    Gastroparesis    Esophageal carcinoma (HCC)    Normocytic anemia    Esophageal fistula    Subacute cough    Acquired  bronchoesophageal fistula (HCC)    Pneumonia of both lower lobes due to infectious organism    Malignant neoplasm of pancreas (HCC)    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 difficile colitis    Dysphagia, unspecified type    Dyspnea and respiratory abnormalities    Hypokalemia    Dysphagia    Narcotic drug use    History of esophageal cancer    Palliative care by specialist    Neoplasm related pain    Endobronchial mass    Hyponatremia    Thrombocytopenia (HCC)    Acute kidney injury (HCC)    Hyperglycemia    Aspiration pneumonitis (HCC)    C. difficile diarrhea    Aspiration pneumonia (HCC)    Malignant neoplasm metastatic to liver (HCC)    Hyperlipidemia    Nausea vomiting and diarrhea    Fistula, bronchoesophageal (HCC)    Iron deficiency anemia, unspecified iron deficiency anemia type    Azotemia    Palliative care encounter    Goals of care, counseling/discussion    Cancer related pain    Anemia    Fistula    Acute cough    Pneumonia of right lung due to infectious organism, unspecified part of lung    Bacteremia    Acute postoperative pain    Exocrine pancreatic insufficiency (HCC)    Hypertriglyceridemia    Acute pulmonary embolism without acute cor pulmonale, unspecified pulmonary embolism type (HCC)    Acute deep vein thrombosis (DVT) of popliteal vein of right lower extremity (HCC)    Altered mental status, unspecified altered mental status type    Transaminitis    Biliary obstruction (HCC)    Hyperbilirubinemia    Esophageal adenocarcinoma (HCC)    Cancer associated pain    Abdominal pain, acute    Abnormal CT of the chest    Lower extremity edema    Abnormal finding of diagnostic imaging     Metastatic adenocarcinoma to esophagus (HCC)    Klebsiella pneumoniae sepsis (HCC)    Septic shock (HCC)    Acalculous cholecystitis    Severe malnutrition (HCC)    Metastatic adenocarcinoma (HCC)    Acute cholecystitis             ASSESSMENT/PLAN:  1. Klebsiella pneumoniae bacteremia in association with acute cholecystitis    -underlying metastatic esophageal cancer on chemo, port in place  -recent PD stent on 9/17 for malignant obstruction  Admitted 9/30 abdominal pain  Increasing RUQ tenderness  CT showing gallbladder distention, HIDA no filling   Surgery and GI following    Repeat blood cultures (after antibiotics started) no growth     Continue Cefazolin and metronidazole  And fluconazole  When ready for home, change to Levaquin 500 daily plus flagyl 500 tid plus daily fluconazole x at least 10 more days.   Radha tube management per IR/Surgery  D/w pt and wife.       [facility]  [phone]

## 2024-10-06 NOTE — PLAN OF CARE
Patient is alert and oriented x 4. Uses call light appropriately and is compliant with medications and care. VSS. Continent of bowel and bladder. Radha drain is patent and dressing is clean, dry and intact. Medications given per mar. Mepilex applied to sacral wound on buttocks. Patient encouraged to reposition q2h per ordered. Safety precautions in place. Call light within reach.    Problem: SKIN/TISSUE INTEGRITY - ADULT  Goal: Skin integrity remains intact  Description: INTERVENTIONS  - Assess and document risk factors for pressure ulcer development  - Assess and document skin integrity  - Monitor for areas of redness and/or skin breakdown  - Initiate interventions, skin care algorithm/standards of care as needed  Outcome: Progressing     Problem: SAFETY ADULT - FALL  Goal: Free from fall injury  Description: INTERVENTIONS:  - Assess pt frequently for physical needs  - Identify cognitive and physical deficits and behaviors that affect risk of falls.  - [place] fall precautions as indicated by assessment.  - Educate pt/family on patient safety including physical limitations  - Instruct pt to call for assistance with activity based on assessment  - Modify environment to reduce risk of injury  - Provide assistive devices as appropriate  - Consider OT/PT consult to assist with strengthening/mobility  - Encourage toileting schedule  Outcome: Progressing     Problem: PAIN - ADULT  Goal: Verbalizes/displays adequate comfort level or patient's stated pain goal  Description: INTERVENTIONS:  - Encourage pt to monitor pain and request assistance  - Assess pain using appropriate pain scale  - Administer analgesics based on type and severity of pain and evaluate response  - Implement non-pharmacological measures as appropriate and evaluate response  - Consider cultural and social influences on pain and pain management  - Manage/alleviate anxiety  - Utilize distraction and/or relaxation techniques  - Monitor for opioid side  effects  - Notify MD/LIP if interventions unsuccessful or patient reports new pain  - Anticipate increased pain with activity and pre-medicate as appropriate  Outcome: Not Progressing

## 2024-10-06 NOTE — PROGRESS NOTES
INFECTIOUS DISEASE  PROGRESS NOTE            [facility]    Dontae Buddy Diego Champion Patient Status:  Inpatient    10/15/1970 MRN [id number]   Location [facility] 4SW-A Attending Sakina Hernandez MD   Hosp Day # 6 PCP Adrian Horowitz MD     Antibiotics: #4  Cefazolin #3  Metronidazole #1    Subjective:  No fever/ some pain at sofy site    Objective:  Temp:  [97.5 °F (36.4 °C)-98.6 °F (37 °C)] 98.6 °F (37 °C)  Pulse:  [62-78] 62  Resp:  [18-20] 18  BP: (152)/(82) 152/82  SpO2:  [91 %-97 %] 97 %    General: awake alert  Vital signs:Temp:  [97.5 °F (36.4 °C)-98.6 °F (37 °C)] 98.6 °F (37 °C)  Pulse:  [62-78] 62  Resp:  [18-20] 18  BP: (152)/(82) 152/82  SpO2:  [91 %-97 %] 97 %  HEENT: Moist mucous membranes. Extraocular muscles are intact.  Neck: supple no masses  Respiratory: Non labored, symmetric excursion, normal respirations  Port intact  Cardiovascular: no irregularities in rhythm  Abdomen, non distended, was tender RUQ on yesterdays exam, deferred today  Musculoskeletal: joints: no swelling   Integument: No lesions. No erythema. No open wounds.  Labs:     COVID-19 Lab Results    COVID-19  Lab Results   Component Value Date    COVID19 Not Detected 2024    COVID19 Not Detected 2024    COVID19 Not Detected 2024       Pro-Calcitonin  Recent Labs   Lab 10/01/24  0625 10/06/24  0623   PCT 3.06* 0.33*       Cardiac  No results for input(s): \"TROP\", \"PBNP\" in the last 168 hours.    Creatinine Kinase  No results for input(s): \"CK\" in the last 168 hours.    Inflammatory Markers  Recent Labs   Lab 24  0637 10/02/24  1035   CRP 22.40*  --    MYAH  --  791*       Recent Labs   Lab 10/06/24  0623   RBC 2.89*   HGB 8.6*   HCT 27.1*   MCV 93.8   MCH 29.8   MCHC 31.7   RDW 14.8   NEPRELIM 10.66*   WBC 12.1*   .0   NEUT 92   LYMPH 1   MON 6   EOS 0         Recent Labs   Lab 10/04/24  0630 10/05/24  0619 10/06/24  06   * 91 110*   BUN 21 15 15   CREATSERUM 0.49* 0.44* 0.44*   CA  8.2* 8.1* 8.3*   ALB 2.6* 2.5* 2.5*    138 137   K 4.0  4.0 3.7 4.3  4.3    105 104   CO2 27.0 28.0 27.0   ALKPHO 176* 165* 160*   AST 50* 32 28   ALT 27 16 11   BILT 1.0 1.0 0.9   TP 5.0* 4.9* 5.1*       No results found for: \"VANCT\"  Microbiology    Hospital Encounter on 09/30/24   1. Aerobic Bacterial Culture     Status: Abnormal (Preliminary result)    Collection Time: 10/04/24  2:40 PM    Specimen: Bile; Body fluid, unspecified   Result Value Ref Range    Aerobic Culture Result 1+ growth Klebsiella pneumoniae (A) N/A    Aerobic Smear No WBCs seen N/A    Aerobic Smear No organisms seen N/A   2. Blood Culture     Status: None (Preliminary result)    Collection Time: 10/01/24 12:19 PM    Specimen: Blood,peripheral   Result Value Ref Range    Blood Culture Result No Growth 4 Days N/A         Problem list reviewed:  Patient Active Problem List   Diagnosis    Primary hypertension    Hyperlipidemia with target low density lipoprotein (LDL) cholesterol less than 70 mg/dL    Coronary artery disease involving coronary bypass graft of native heart with angina pectoris (HCC)    S/P CABG x 4    Nontoxic multinodular goiter    Pulmonary nodules    Thrombophlebitis leg    BRANDAN (generalized anxiety disorder)    Right shoulder Arthroscopy acromioplasty ,distal  clavicle resection, rotator cuff/labral debridment  Global 05/13/2021    Right bicipital tenosynovitis    Malignant neoplasm of esophagus (HCC)    Pleural effusion    Esophageal anastomotic leak    Esophageal obstruction    On total parenteral nutrition (TPN)    Mechanical complication of esophagostomy (HCC)    Abdominal pain of unknown etiology    Migration of esophageal stent    Gastroparesis    Esophageal carcinoma (HCC)    Normocytic anemia    Esophageal fistula    Subacute cough    Acquired bronchoesophageal fistula (HCC)    Pneumonia of both lower lobes due to infectious organism    Malignant neoplasm of pancreas (HCC)    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 difficile colitis    Dysphagia, unspecified type    Dyspnea and respiratory abnormalities    Hypokalemia    Dysphagia    Narcotic drug use    History of esophageal cancer    Palliative care by specialist    Neoplasm related pain    Endobronchial mass    Hyponatremia    Thrombocytopenia (HCC)    Acute kidney injury (HCC)    Hyperglycemia    Aspiration pneumonitis (HCC)    C. difficile diarrhea    Aspiration pneumonia (HCC)    Malignant neoplasm metastatic to liver (HCC)    Hyperlipidemia    Nausea vomiting and diarrhea    Fistula, bronchoesophageal (HCC)    Iron deficiency anemia, unspecified iron deficiency anemia type    Azotemia    Palliative care encounter    Goals of care, counseling/discussion    Cancer related pain    Anemia    Fistula    Acute cough    Pneumonia of right lung due to infectious organism, unspecified part of lung    Bacteremia    Acute postoperative pain    Exocrine pancreatic insufficiency (HCC)    Hypertriglyceridemia    Acute pulmonary embolism without acute cor pulmonale, unspecified pulmonary embolism type (HCC)    Acute deep vein thrombosis (DVT) of popliteal vein of right lower extremity (HCC)    Altered mental status, unspecified altered mental status type    Transaminitis    Biliary obstruction (HCC)    Hyperbilirubinemia    Esophageal adenocarcinoma (HCC)    Cancer associated pain    Abdominal pain, acute    Abnormal CT of the chest    Lower extremity edema    Abnormal finding of diagnostic imaging    Metastatic adenocarcinoma to esophagus (HCC)    Klebsiella pneumoniae sepsis (HCC)    Septic shock (HCC)    Acalculous cholecystitis    Severe malnutrition (HCC)     Metastatic adenocarcinoma (HCC)    Acute cholecystitis             ASSESSMENT/PLAN:  1. Klebsiella pneumoniae bacteremia in association with acute cholecystitis    -underlying metastatic esophageal cancer on chemo, port in place  -recent PD stent on 9/17 for malignant obstruction  Admitted 9/30 abdominal pain  Increasing RUQ tenderness  CT showing gallbladder distention, HIDA no filling   Surgery and GI following    Repeat blood cultures (after antibiotics started) no growth     Continue Cefazolin and metronidazole  When ready for home, change to Levaquin 500 daily plus flagyl 500 tid x at least 10 more days.   Radha tube management per IR/Surgery  D/w pt and wife.       [facility]  [phone]

## 2024-10-07 ENCOUNTER — APPOINTMENT (OUTPATIENT)
Dept: GENERAL RADIOLOGY | Facility: HOSPITAL | Age: 54
End: 2024-10-07
Attending: INTERNAL MEDICINE
Payer: COMMERCIAL

## 2024-10-07 PROBLEM — J96.01 ACUTE RESPIRATORY FAILURE WITH HYPOXIA (HCC): Status: ACTIVE | Noted: 2024-01-01

## 2024-10-07 PROBLEM — J96.01 ACUTE RESPIRATORY FAILURE WITH HYPOXIA (HCC): Status: ACTIVE | Noted: 2024-10-07

## 2024-10-07 PROBLEM — R45.89 ANXIETY ABOUT HEALTH: Status: ACTIVE | Noted: 2024-10-07

## 2024-10-07 PROBLEM — R45.89 ANXIETY ABOUT HEALTH: Status: ACTIVE | Noted: 2024-01-01

## 2024-10-07 LAB
ALBUMIN SERPL-MCNC: 2.4 G/DL (ref 3.2–4.8)
ALBUMIN/GLOB SERPL: 1 {RATIO} (ref 1–2)
ALP LIVER SERPL-CCNC: 147 U/L
ALT SERPL-CCNC: 7 U/L
ANION GAP SERPL CALC-SCNC: 3 MMOL/L (ref 0–18)
AST SERPL-CCNC: 27 U/L (ref ?–34)
BASOPHILS # BLD: 0 X10(3) UL (ref 0–0.2)
BASOPHILS NFR BLD: 0 %
BILIRUB SERPL-MCNC: 0.8 MG/DL (ref 0.3–1.2)
BUN BLD-MCNC: 17 MG/DL (ref 9–23)
CALCIUM BLD-MCNC: 7.8 MG/DL (ref 8.7–10.4)
CHLORIDE SERPL-SCNC: 103 MMOL/L (ref 98–112)
CO2 SERPL-SCNC: 31 MMOL/L (ref 21–32)
CREAT BLD-MCNC: 0.46 MG/DL
CRP SERPL-MCNC: 7.9 MG/DL (ref ?–0.5)
EGFRCR SERPLBLD CKD-EPI 2021: 124 ML/MIN/1.73M2 (ref 60–?)
EOSINOPHIL # BLD: 0.12 X10(3) UL (ref 0–0.7)
EOSINOPHIL NFR BLD: 1 %
ERYTHROCYTE [DISTWIDTH] IN BLOOD BY AUTOMATED COUNT: 14.8 %
GLOBULIN PLAS-MCNC: 2.5 G/DL (ref 2–3.5)
GLUCOSE BLD-MCNC: 104 MG/DL (ref 70–99)
HCT VFR BLD AUTO: 25.7 %
HGB BLD-MCNC: 8.2 G/DL
LYMPHOCYTES NFR BLD: 0.12 X10(3) UL (ref 1–4)
LYMPHOCYTES NFR BLD: 1 %
MCH RBC QN AUTO: 29.7 PG (ref 26–34)
MCHC RBC AUTO-ENTMCNC: 31.9 G/DL (ref 31–37)
MCV RBC AUTO: 93.1 FL
METAMYELOCYTES # BLD: 0.12 X10(3) UL
METAMYELOCYTES NFR BLD: 1 %
MONOCYTES # BLD: 0.6 X10(3) UL (ref 0.1–1)
MONOCYTES NFR BLD: 5 %
MORPHOLOGY: NORMAL
NEUTROPHILS # BLD AUTO: 10.19 X10 (3) UL (ref 1.5–7.7)
NEUTROPHILS NFR BLD: 92 %
NEUTS HYPERSEG # BLD: 10.95 X10(3) UL (ref 1.5–7.7)
OSMOLALITY SERPL CALC.SUM OF ELEC: 286 MOSM/KG (ref 275–295)
PLATELET # BLD AUTO: 197 10(3)UL (ref 150–450)
PLATELET MORPHOLOGY: NORMAL
POTASSIUM SERPL-SCNC: 3.8 MMOL/L (ref 3.5–5.1)
PROT SERPL-MCNC: 4.9 G/DL (ref 5.7–8.2)
RBC # BLD AUTO: 2.76 X10(6)UL
SODIUM SERPL-SCNC: 137 MMOL/L (ref 136–145)
TOTAL CELLS COUNTED BLD: 100
WBC # BLD AUTO: 11.9 X10(3) UL (ref 4–11)

## 2024-10-07 PROCEDURE — 99231 SBSQ HOSP IP/OBS SF/LOW 25: CPT | Performed by: SURGERY

## 2024-10-07 PROCEDURE — 99232 SBSQ HOSP IP/OBS MODERATE 35: CPT | Performed by: NURSE PRACTITIONER

## 2024-10-07 PROCEDURE — 99497 ADVNCD CARE PLAN 30 MIN: CPT | Performed by: NURSE PRACTITIONER

## 2024-10-07 PROCEDURE — 71045 X-RAY EXAM CHEST 1 VIEW: CPT | Performed by: INTERNAL MEDICINE

## 2024-10-07 PROCEDURE — 99232 SBSQ HOSP IP/OBS MODERATE 35: CPT | Performed by: INTERNAL MEDICINE

## 2024-10-07 RX ORDER — METRONIDAZOLE 500 MG/1
500 TABLET ORAL EVERY 12 HOURS SCHEDULED
Status: DISCONTINUED | OUTPATIENT
Start: 2024-10-07 | End: 2024-10-12

## 2024-10-07 RX ORDER — OXYCODONE HYDROCHLORIDE 10 MG/1
10 TABLET ORAL
Status: DISCONTINUED | OUTPATIENT
Start: 2024-10-07 | End: 2024-10-12

## 2024-10-07 RX ORDER — FLUCONAZOLE 200 MG/1
200 TABLET ORAL DAILY
Qty: 10 TABLET | Refills: 0 | Status: SHIPPED | OUTPATIENT
Start: 2024-10-07 | End: 2024-10-17

## 2024-10-07 RX ORDER — LOPERAMIDE HYDROCHLORIDE 2 MG/1
2 CAPSULE ORAL 2 TIMES DAILY PRN
Status: DISCONTINUED | OUTPATIENT
Start: 2024-10-07 | End: 2024-10-12

## 2024-10-07 RX ORDER — OXYCODONE HYDROCHLORIDE 10 MG/1
10 TABLET ORAL EVERY 6 HOURS SCHEDULED
Status: DISCONTINUED | OUTPATIENT
Start: 2024-10-07 | End: 2024-10-12

## 2024-10-07 RX ORDER — LORAZEPAM 0.5 MG/1
0.5 TABLET ORAL ONCE
Status: COMPLETED | OUTPATIENT
Start: 2024-10-07 | End: 2024-10-07

## 2024-10-07 RX ORDER — FLUCONAZOLE 100 MG/1
400 TABLET ORAL DAILY
Status: DISCONTINUED | OUTPATIENT
Start: 2024-10-07 | End: 2024-10-12

## 2024-10-07 RX ORDER — LORAZEPAM 0.5 MG/1
0.5 TABLET ORAL NIGHTLY PRN
Status: DISCONTINUED | OUTPATIENT
Start: 2024-10-07 | End: 2024-10-08

## 2024-10-07 NOTE — CM/SW NOTE
SW noted hospice consult stating that patient's wife would like a call this afternoon to discuss Hospice provider options. SW attempted to reach patient's wife via phone call but was unable to reach. SW left a voicemail requesting a call back    SW drafted referral for hospice services and will await call back from patient's wife.    Update: MARIZA spoke with patient's wife Alysha regarding hospice referral. She requested referral be sent to [facility] and [facility]. She reported that she would like to meet with both agencies prior to deciding which provider. She informed SW that she will be here around 10am and could meet with [facility] in the morning. Plan to meet with [facility] in the afternoon if they are available. SW sent referral and notified both agencies of the request.     SW will continue to follow for plan of care changes and remain available for any additional DC needs or concerns.     Aaknksha Helms MSW, LSW  Discharge Planner   [phone]

## 2024-10-07 NOTE — PLAN OF CARE
Patient is AOX4, vitals stable, on 3L nasal cannula, complaints of pain. Biliary drain patent and draining, daily flush completed, meds per MAR. Safety precautions in place. Call light within reach.

## 2024-10-07 NOTE — TELEPHONE ENCOUNTER
From: Dontae Cortez Jr.  To: NATHANIEL BOWDEN  Sent: 10/5/2024 10:34 PM CDT  Subject: Oxycodone amount at home    Hi it's Alysha,    Per the request of your assistant at the hospital, she asked if I would count Ronen's Oxycodone 10 mg pills at home. There's 125 pills left and it was filled with 180 pills on 9/19 & he was admitted early at 6am Mon 9/30. Hope this helps.

## 2024-10-07 NOTE — CONSULTS
[facility]  Report of Inpatient Wound Care Consultation    Dontae Cortez Jr. Patient Status:  Inpatient    10/15/1970 MRN [id number]   Location [facility] 4NW-A Attending Sakina Hernandez MD   Hosp Day # 7 PCP Adrian Horowitz MD     Reason for Consultation:  Sacrum    History of Present Illness:  Dontae Cortez Jr. is a a(n) 53 year old male. Patient seen at bedside with a fellow wound care nurse.Patient presents with pressure injury to sacrum Denies pain at time.Patient with multiple comorbidities.      History:  Past Medical History:    Back problem    Belching    Black stools    Borderline diabetes    Dx in 2013 - HgA1C 6.2%    C. difficile diarrhea    pt treated and without symptoms    Chest pain    Coronary artery disease    On 13: CABG x 4 with LIMA to LAD and SVG to diagonal, OM and PDA    Decorative tattoo    Depression    Difficult intubation    h/o esophagectomy for CA; developed esophagobronchial vistula    Disorder of liver    LIVER CA    Esophageal cancer (HCC)    completed chemo    Esophageal reflux    Essential hypertension    Exposure to medical diagnostic radiation    last tx 2022    Frequent urination    Gastroparesis    Heartburn    High blood pressure    High cholesterol    Found when I had quadruple bypass    History of COVID-19    asymptomatic - pt was dx during a hospitalization for another diagnosis. No continued symptoms    History of stomach ulcers    Hyperlipidemia    Hyperlipidemia LDL goal < 70    Indigestion    Morbid obesity with BMI of 40.0-44.9, adult (HCC)    Muscle weakness    Nausea vomiting and diarrhea    Nontoxic multinodular goiter    Dx in 2013: pt was told that imaging showed thyroid cysts per PCP    Pancreatic cancer (HCC)    last dose 2023 is scheduled for another round 23    Peripheral vascular disease (HCC)    pt denies    Personal history of antineoplastic chemotherapy    for esophageal cancer/completed    Personal history of  antineoplastic chemotherapy    pancreatic cancer    Personal history of antineoplastic chemotherapy    Last treatment 3/12/24    Problems with swallowing    Pulmonary nodules    Dx in 8/2013: CT chest showed small bilateral fissural-based lung nodules less than 1 cm    S/P CABG x 4    On 8/16/13: CABG x 4 with LIMA to LAD and SVG to diagonal, OM and PDA    Shortness of breath    when coughing; no oxygen    Vomiting     Past Surgical History:   Procedure Laterality Date    Appendectomy      Appendectomy      Arthroscopy of joint unlisted      right shoulder    Cabg      On 8/16/13: CABG x 4 with LIMA to LAD and SVG to diagonal, OM and PDA    Cardiac cath lab      On 8/14/2013: cardiac cath showed 3-vessel disease    Other surgical history      1.       Laparoscopic robotic-assisted esophagogastrectomy.    Port, indwelling, imp        reports that he quit smoking about 11 years ago. His smoking use included cigarettes. He started smoking about 38 years ago. He has a 27 pack-year smoking history. He has never been exposed to tobacco smoke. He has never used smokeless tobacco. He reports that he does not drink alcohol and does not use drugs.      Allergies:  @ALLERGY    Laboratory Data:    Recent Labs   Lab 10/03/24  0743 10/04/24  0630 10/04/24  1257 10/05/24  0618 10/05/24  0619 10/06/24  0623 10/07/24  0434   WBC  --  10.2  --  10.4  --  12.1* 11.9*   HGB  --  8.3*  --  8.0*  --  8.6* 8.2*   HCT  --  25.4*  --  24.5*  --  27.1* 25.7*   PLT  --  171.0  --  163.0  --  184.0 197.0   CREATSERUM  --  0.49*  --   --  0.44* 0.44* 0.46*   BUN  --  21  --   --  15 15 17   GLU  --  109*  --   --  91 110* 104*   CA  --  8.2*  --   --  8.1* 8.3* 7.8*   ALB  --  2.6*  --   --  2.5* 2.5* 2.4*   TP  --  5.0*  --   --  4.9* 5.1* 4.9*   INR 2.02* 2.20* 2.15*  --   --   --   --    CRP  --   --   --   --   --   --  7.90*         ASSESSMENT:  Wound 10/01/24 Sacrum (Active)   Date First Assessed/Time First Assessed: 10/01/24 2050    Present on Original Admission: (c) No  Primary Wound Type: Pressure Injury  Location: Sacrum  Wound Description (Comments): purple - non blanchable - red around buttock.  In report sits in chair...      Assessments 10/7/2024  8:28 AM   Wound Image     Drainage Amount Scant   Drainage Description Serous;Yellow   Wound Length (cm) 5.5 cm   Wound Width (cm) 5 cm   Wound Surface Area (cm^2) 27.5 cm^2   Wound Depth (cm) 0.1 cm   Wound Volume (cm^3) 2.75 cm^3   Angeline-wound Assessment Blanchable erythema   Wound Bed Slough (%) 40 %   Wound Odor None   Shape About 30 % of the wound is deep tissue injury and about 30 % intact skin included in the measurement   Pressure Injury Stage Unstageable        Wound Cleaning and Dressings:  Wound cleansing:  Cleanse with saline  Wound product: Honey gel to areas with slough.Cover with bordered foam  Dressing change frequency:  Change dressing daily and/or PRN      Miscellaneous/Additional Orders:  Offloading: Air mattress and Turn Q 2 hours and as needed- patient needs to reminded    Miscellaneous orders: Increase dietary protein intake to promote healing.Dietitian to evaluate amount of protein intake/supplements depending on kidney functions     Care Summary:  Care Summary: Discussed Plan of Care at beside with patient. Patient verbally acknowledges understanding of all instructions and all questions were answered.    Recommendations :  Follow up with home health care  and the outpatient wound clinic if discharged to home      Thank you for this consultation and for allowing me to participate in the care of your patient.      Time Spent 30 Minutes.    Thank you,  Sherri Schulz RN BSN [facility]  Wound Care Clinician  [place] [facility]  10/7/2024  9:19 AM

## 2024-10-07 NOTE — PROGRESS NOTES
INFECTIOUS DISEASE  PROGRESS NOTE            [facility]    Dontae Buddy Diego Champion Patient Status:  Inpatient    10/15/1970 MRN [id number]   [facility] 4SW-A Attending Sakina Hernandez MD   Hosp Day # 7 PCP Adrian Horowitz MD     Antibiotics: #7  Cefazolin #6  Metronidazole #4  Fluconazole #1    Subjective:  comfortable    Objective:  Temp:  [97.4 °F (36.3 °C)-97.8 °F (36.6 °C)] 97.6 °F (36.4 °C)  Pulse:  [66-71] 69  BP: (136-151)/(74-82) 136/75  SpO2:  [89 %-99 %] 99 %    General: awake alert  Vital signs:Temp:  [97.4 °F (36.3 °C)-97.8 °F (36.6 °C)] 97.6 °F (36.4 °C)  Pulse:  [66-71] 69  BP: (136-151)/(74-82) 136/75  SpO2:  [89 %-99 %] 99 %  HEENT: Moist mucous membranes. Extraocular muscles are intact.  Neck: supple no masses  Respiratory: Non labored, symmetric excursion, normal respirations  Port intact  Cardiovascular: no irregularities in rhythm  Abdomen, non distended, drain in place  Musculoskeletal: joints: no swelling   Integument: No lesions. No erythema. No open wounds.  Labs:     COVID-19 Lab Results    COVID-19  Lab Results   Component Value Date    COVID19 Not Detected 2024    COVID19 Not Detected 2024    COVID19 Not Detected 2024       Pro-Calcitonin  Recent Labs   Lab 10/01/24  0625 10/06/24  0623   PCT 3.06* 0.33*       Cardiac  No results for input(s): \"TROP\", \"PBNP\" in the last 168 hours.    Creatinine Kinase  No results for input(s): \"CK\" in the last 168 hours.    Inflammatory Markers  Recent Labs   Lab 10/02/24  1035 10/07/24  0434   CRP  --  7.90*   MYAH 791*  --        Recent Labs   Lab 10/07/24  0434   RBC 2.76*   HGB 8.2*   HCT 25.7*   MCV 93.1   MCH 29.7   MCHC 31.9   RDW 14.8   NEPRELIM 10.19*   WBC 11.9*   .0   NEUT 92   LYMPH 1   MON 5   EOS 1         Recent Labs   Lab 10/05/24  0619 10/06/24  0623 10/07/24  0434   GLU 91 110* 104*   BUN 15 15 17   CREATSERUM 0.44* 0.44* 0.46*   CA 8.1* 8.3* 7.8*   ALB 2.5* 2.5* 2.4*    137 137   K  3.7 4.3  4.3 3.8    104 103   CO2 28.0 27.0 31.0   ALKPHO 165* 160* 147*   AST 32 28 27   ALT 16 11 7*   BILT 1.0 0.9 0.8   TP 4.9* 5.1* 4.9*       No results found for: \"VANCT\"  Microbiology    Hospital Encounter on 09/30/24   1. Anaerobic Culture     Status: None (Preliminary result)    Collection Time: 10/04/24  2:40 PM    Specimen: Bile; Body fluid, unspecified   Result Value Ref Range    Anaerobic Culture No Anaerobes to date N/A   2. Aerobic Bacterial Culture     Status: Abnormal    Collection Time: 10/04/24  2:40 PM    Specimen: Bile; Body fluid, unspecified   Result Value Ref Range    Aerobic Culture Result 1+ growth Klebsiella pneumoniae (A) N/A    Aerobic Culture Result 4+ growth Candida tropicalis (A) N/A    Aerobic Smear No WBCs seen N/A    Aerobic Smear No organisms seen N/A       Susceptibility    Klebsiella pneumoniae -  (no method available)     Ampicillin  Resistant      Ampicillin + Sulbactam <=2 Sensitive      Cefazolin <=4 Sensitive      Ciprofloxacin <=0.25 Sensitive      Gentamicin <=1 Sensitive      Meropenem <=0.25 Sensitive      Levofloxacin <=0.12 Sensitive      Piperacillin + Tazobactam <=4 Sensitive      Trimethoprim/Sulfa <=20 Sensitive    3. Blood Culture     Status: None    Collection Time: 10/01/24 12:19 PM    Specimen: Blood,peripheral   Result Value Ref Range    Blood Culture Result No Growth 5 Days N/A         Problem list reviewed:  Patient Active Problem List   Diagnosis    Primary hypertension    Hyperlipidemia with target low density lipoprotein (LDL) cholesterol less than 70 mg/dL    Coronary artery disease involving coronary bypass graft of native heart with angina pectoris (HCC)    S/P CABG x 4    Nontoxic multinodular goiter    Pulmonary nodules    Thrombophlebitis leg    BRANDAN (generalized anxiety disorder)    Right shoulder Arthroscopy acromioplasty ,distal  clavicle resection, rotator cuff/labral debridment  Global 05/13/2021    Right bicipital tenosynovitis     Malignant neoplasm of esophagus (HCC)    Pleural effusion    Esophageal anastomotic leak    Esophageal obstruction    On total parenteral nutrition (TPN)    Mechanical complication of esophagostomy (HCC)    Abdominal pain of unknown etiology    Migration of esophageal stent    Gastroparesis    Esophageal carcinoma (HCC)    Normocytic anemia    Esophageal fistula    Subacute cough    Acquired bronchoesophageal fistula (HCC)    Pneumonia of both lower lobes due to infectious organism    Malignant neoplasm of pancreas (HCC)    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 difficile colitis    Dysphagia, unspecified type    Dyspnea and respiratory abnormalities    Hypokalemia    Dysphagia    Narcotic drug use    History of esophageal cancer    Palliative care by specialist    Neoplasm related pain    Endobronchial mass    Hyponatremia    Thrombocytopenia (HCC)    Acute kidney injury (HCC)    Hyperglycemia    Aspiration pneumonitis (HCC)    C. difficile diarrhea    Aspiration pneumonia (HCC)    Malignant neoplasm metastatic to liver (HCC)    Hyperlipidemia    Nausea vomiting and diarrhea    Fistula, bronchoesophageal (HCC)    Iron deficiency anemia, unspecified iron deficiency anemia type    Azotemia    Palliative care encounter    Goals of care, counseling/discussion    Cancer related pain    Anemia    Fistula    Acute cough    Pneumonia of right lung due to infectious organism, unspecified part of lung    Bacteremia    Acute postoperative pain    Exocrine pancreatic insufficiency (HCC)    Hypertriglyceridemia    Acute pulmonary embolism without acute cor pulmonale, unspecified pulmonary embolism type (HCC)    Acute deep  vein thrombosis (DVT) of popliteal vein of right lower extremity (HCC)    Altered mental status, unspecified altered mental status type    Transaminitis    Biliary obstruction (HCC)    Hyperbilirubinemia    Esophageal adenocarcinoma (HCC)    Cancer associated pain    Abdominal pain, acute    Abnormal CT of the chest    Lower extremity edema    Abnormal finding of diagnostic imaging    Metastatic adenocarcinoma to esophagus (HCC)    Klebsiella pneumoniae sepsis (HCC)    Septic shock (HCC)    Acalculous cholecystitis    Severe malnutrition (HCC)    Metastatic adenocarcinoma (HCC)    Acute cholecystitis             ASSESSMENT/PLAN:  1. Klebsiella pneumoniae bacteremia in association with acute cholecystitis    -underlying metastatic esophageal cancer on chemo, port in place  -recent PD stent on 9/17 for malignant obstruction  Admitted 9/30 abdominal pain, RUQ tenderness, CT gallbladder distention, and positive HIDA    -SP IR cholecystostomy 10/4    Klebsiella pneumonia and candida tropicalis from IR drain      Continue cefazolin, flagyl, with addition of fluconazole for yeast      --PO levaquin, fluconazole, flagyl when dc    Conner Bell MD, MD [facility]  [phone]

## 2024-10-07 NOTE — PROGRESS NOTES
[facility] liaison received hospice consult LVM for Janusz wife and confirmed to meet tomorrow at 11a bedside as requested per MARIZA Vale.     [facility]  [phone]

## 2024-10-07 NOTE — PROGRESS NOTES
[facility]  Progress Note    Dontae Cortez Jr. Patient Status:  Inpatient    10/15/1970 MRN [id number]   [facility] 4NW-A Attending Sakina Hernandez MD   Hosp Day # 7 PCP Adrian Horowitz MD     Subjective:  Dontae Cortez Jr. is a(n) 53 year old male thinks he feels about the same  Ongoing cough productive of intermittent amounts of yellow to green light no blood    Objective:  /75   Pulse 69   Temp 97.6 °F (36.4 °C) (Oral)   Resp 19   Ht 6' 2\" (1.88 m)   Wt 172 lb 2.9 oz (78.1 kg)   SpO2 99%   BMI 22.11 kg/m² remains on oxygen 2 to 4 L denies any specific shortness of breath at rest notes dyspnea when walking to the bathroom      Temp (24hrs), Av.6 °F (36.4 °C), Min:97.4 °F (36.3 °C), Max:97.8 °F (36.6 °C)      Intake/Output:    Intake/Output Summary (Last 24 hours) at 10/7/2024 1304  Last data filed at 10/7/2024 0604  Gross per 24 hour   Intake 1923 ml   Output 2425 ml   Net -502 ml       Physical Exam:   General: alert, cooperative, oriented.  No respiratory distress.  Supine  Ongoing abdominal pain   Head: Normocephalic, without obvious abnormality, atraumatic.   Throat: Lips, mucosa, and tongue normal.  No thrush noted.   Neck: trachea midline, no adenopathy, no thyromegaly.    Lungs: Bilateral rales mild rare sense of rhonchi   Chest wall: No tenderness or deformity.   Heart: Regular rate and rhythm   Abdomen: soft, non-distended, no masses, no guarding, no     Rebound.  dressed drained mildly tender protuberant   Extremity: Mild edema   Skin: No rashes or lesions.   Neurological: Alert, interactive, no focal deficits    Lab Data Review:  Recent Labs     10/05/24  0618 10/06/24  0623 10/07/24  0434   WBC 10.4 12.1* 11.9*   HGB 8.0* 8.6* 8.2*   .0 184.0 197.0     Recent Labs     10/05/24  0619 10/06/24  0623 10/07/24  0434    137 137   K 3.7 4.3  4.3 3.8    104 103   CO2 28.0 27.0 31.0   BUN 15 15 17   CREATSERUM 0.44* 0.44* 0.46*     Recent Labs    Lab 10/03/24  0743 10/04/24  0630 10/04/24  1257   PTP 23.0* 24.6* 24.2*   INR 2.02* 2.20* 2.15*       Cultures: Klebsiella and Candida from tube  Klebsiella from blood cultures has cleared    Radiology:  XR CHEST AP PORTABLE  (CPT=71045)    Addendum Date: 10/7/2024    ADDENDUM:  Loss of the distal the right 3rd rib redemonstrated.  Dictated by (CST): Ernesto Cabrera MD on 10/07/2024 at 8:14 AM     Finalized by (CST): Ernesto Cabrera MD on 10/07/2024 at 8:15 AM             Result Date: 10/7/2024  CONCLUSION:    Extensive areas of infiltrate upper and lower left lung stable.  Improving areas of infiltrate right mid-lower lung.  Small effusions suspected both sides.  Stable cardiac enlargement.  No definite pneumothorax.  LOCATION:  [id number]      Dictated by (CST): Ernesto Cabrera MD on 10/07/2024 at 8:13 AM     Finalized by (CST): Ernesto Cabrera MD on 10/07/2024 at 8:14 AM      Serial chest x-rays remain with bilateral/increasing pulmonary infiltrates      Medications reviewed     Assessment and Plan:   Patient Active Problem List   Diagnosis    Primary hypertension    Hyperlipidemia with target low density lipoprotein (LDL) cholesterol less than 70 mg/dL    Coronary artery disease involving coronary bypass graft of native heart with angina pectoris (HCC)    S/P CABG x 4    Nontoxic multinodular goiter    Pulmonary nodules    Thrombophlebitis leg    BRANDAN (generalized anxiety disorder)    Right shoulder Arthroscopy acromioplasty ,distal  clavicle resection, rotator cuff/labral debridment  Global 05/13/2021    Right bicipital tenosynovitis    Malignant neoplasm of esophagus (HCC)    Pleural effusion    Esophageal anastomotic leak    Esophageal obstruction    On total parenteral nutrition (TPN)    Mechanical complication of esophagostomy (HCC)    Abdominal pain of unknown etiology    Migration of esophageal stent    Gastroparesis    Esophageal carcinoma (HCC)    Normocytic anemia    Esophageal fistula    Subacute cough     Acquired bronchoesophageal fistula (HCC)    Pneumonia of both lower lobes due to infectious organism    Malignant neoplasm of pancreas (HCC)    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 difficile colitis    Dysphagia, unspecified type    Dyspnea and respiratory abnormalities    Hypokalemia    Dysphagia    Narcotic drug use    History of esophageal cancer    Palliative care by specialist    Neoplasm related pain    Endobronchial mass    Hyponatremia    Thrombocytopenia (HCC)    Acute kidney injury (HCC)    Hyperglycemia    Aspiration pneumonitis (HCC)    C. difficile diarrhea    Aspiration pneumonia (HCC)    Malignant neoplasm metastatic to liver (HCC)    Hyperlipidemia    Nausea vomiting and diarrhea    Fistula, bronchoesophageal (HCC)    Iron deficiency anemia, unspecified iron deficiency anemia type    Azotemia    Palliative care encounter    Goals of care, counseling/discussion    Cancer related pain    Anemia    Fistula    Acute cough    Pneumonia of right lung due to infectious organism, unspecified part of lung    Bacteremia    Acute postoperative pain    Exocrine pancreatic insufficiency (HCC)    Hypertriglyceridemia    Acute pulmonary embolism without acute cor pulmonale, unspecified pulmonary embolism type (HCC)    Acute deep vein thrombosis (DVT) of popliteal vein of right lower extremity (HCC)    Altered mental status, unspecified altered mental status type    Transaminitis    Biliary obstruction (HCC)    Hyperbilirubinemia    Esophageal adenocarcinoma (HCC)    Cancer associated pain    Abdominal pain, acute    Abnormal CT of the chest    Lower extremity edema    Abnormal finding of diagnostic imaging     Metastatic adenocarcinoma to esophagus (HCC)    Klebsiella pneumoniae sepsis (HCC)    Septic shock (HCC)    Acalculous cholecystitis    Severe malnutrition (HCC)    Metastatic adenocarcinoma (HCC)    Acute cholecystitis       Assessment:  Acute hypoxic respiratory failure: Improved but remains on O2  Klebsiella pneumoniae bacteremia/from drain as well- suspected due to acute cholecystitis:  Pulmonary infiltrates-some waxing and waning and hypoxia is improving-following on antibiotics -following for need for steroids  Bilateral small pleural effusion/increasing fluid collection along liver  Nonocclusive pulmonary embolism and left upper lobe and left lower lobe branches of pulmonary artery  Mediastinal adenopathy with conglomeration of AP window lymph nodes 2.5 x 2.4 cm  Esophageal cancer status postchemotherapy received paclitaxel 2 weeks ago-  History of fistula postop  History of pulmonary nodules.    Plan:  Now on Eliquis  Following on antibiotics  Serial chest x-rays  Continuing to wean FiO2 as able    CC     Mago Zavala MD  10/7/2024  1:04 PM

## 2024-10-07 NOTE — PROGRESS NOTES
[facility]   part of [facility]  Palliative Care Progress Note    Dontae Cortez Jr. Patient Status:  Inpatient    10/15/1970 MRN [id number]   Location [facility] 4NW-A Attending Nixon Vergara MD   Hosp Day # 7 PCP Adrian Horowitz MD     History/Other:    Dontae Cortez Jr. is a 53 year old male with history of metastatic pancreatic cancer (dx 2022 see oncology consult for hx details) s/p esophagectomy and chemo (on current new tx regimen 1st cycle received 24), PE (Eliquis), HTN, CAD s/p CABG () who was admitted on 2024 for pain exacerbation, SOB and AMS (per wife). Work up in our hospital revealed CT A/P as noted below with generalized inflammatory processes and lungs with diffuse GGO bilat, also with hypokalemia, hyponatremia and BLE edema.    Interval history:  RRT 10/1/24-> tx to ICU for lethargy, hypotension, sepsis- improved status & tx to floor 10/2/24. No surgery planned. 10/4/24- perc sofy drain placed. 10/5/24- CT w/ worsening fluid surrounding liver & pleural effusions, continued with abd discomfort, developed dyspnea/ hypoxia- pulm consulted. 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       Consult ordered by:: Taylor Hitchcock for evaluation of Palliative Care needs and Uncontrolled symptoms.    Allergies:  Allergies   Allergen Reactions    Oxaliplatin HIVES     Shaking      Medications:     Current Facility-Administered Medications:     oxyCODONE immediate release tab 10 mg, 10 mg, Oral, 4 times per day    oxyCODONE immediate release tab 10 mg, 10 mg, Oral, Q3H PRN    loperamide (Imodium) cap 2 mg, 2 mg, Oral, BID PRN    LORazepam (Ativan) tab 0.5 mg, 0.5 mg, Oral, Nightly PRN    metRONIDAZOLE (Flagyl) tab 500 mg, 500 mg, Oral, 2 times per day    fluconazole (Diflucan) tab 400 mg, 400 mg, Oral, Daily    calcium carbonate (Tums) chewable tab 500 mg, 500 mg, Oral, TID PRN    benzonatate (Tessalon) cap 100 mg, 100 mg, Oral,  TID PRN    ipratropium-albuterol (Duoneb) 0.5-2.5 (3) MG/3ML inhalation solution 3 mL, 3 mL, Nebulization, Q6H PRN    ceFAZolin (Ancef) 2g in 10mL IV syringe premix, 2 g, Intravenous, Q8H    baclofen (Lioresal) tab 5 mg, 5 mg, Oral, BID    vancomycin (Vancocin) cap 125 mg, 125 mg, Oral, Daily    heparin (Porcine) 100 Units/mL lock flush 500 Units, 5 mL, Intravenous, PRN    apixaban (Eliquis) tab 5 mg, 5 mg, Oral, BID    OLANZapine (ZyPREXA) tab 5 mg, 5 mg, Oral, Nightly    pantoprazole (Protonix) DR tab 40 mg, 40 mg, Oral, Before breakfast    pregabalin (Lyrica) cap 25 mg, 25 mg, Oral, BID    sertraline (Zoloft) tab 200 mg, 200 mg, Oral, Daily    acetaminophen (Tylenol Extra Strength) tab 500 mg, 500 mg, Oral, Q4H PRN    melatonin tab 3 mg, 3 mg, Oral, Nightly PRN    glycerin-hypromellose- (Artificial Tears) 0.2-0.2-1 % ophthalmic solution 1 drop, 1 drop, Both Eyes, QID PRN    sodium chloride (Saline Mist) 0.65 % nasal solution 1 spray, 1 spray, Each Nare, Q3H PRN    ondansetron (Zofran) 4 MG/2ML injection 4 mg, 4 mg, Intravenous, Q6H PRN    prochlorperazine (Compazine) 10 MG/2ML injection 5 mg, 5 mg, Intravenous, Q8H PRN    [DISCONTINUED] HYDROmorphone (Dilaudid) 1 MG/ML injection 0.2 mg, 0.2 mg, Intravenous, Q2H PRN **OR** HYDROmorphone (Dilaudid) 1 MG/ML injection 0.4 mg, 0.4 mg, Intravenous, Q2H PRN **OR** [DISCONTINUED] HYDROmorphone (Dilaudid) 1 MG/ML injection 0.8 mg, 0.8 mg, Intravenous, Q2H PRN    Subjective      Interval history:  RRT 10/1/24-> tx to ICU for lethargy, hypotension, sepsis- improved status & tx to floor 10/2/24. No surgery planned. 10/4/24- perc sofy drain placed. 10/5/24- CT w/ worsening fluid surrounding liver & pleural effusions, continued with abd discomfort, developed dyspnea/ hypoxia- pulm consulted. 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     When I entered the room, the patient was awake, alert, and lying in bed. No family present at bedside.    Ronen reported he has been continuing to experience pain(see below), he is worried about his health declines.  He reports he eventually wants to go home, he is not ready to \"give up, but I don't know how much more my body can take.\"  See discussion below.     Symptom assessment:  Pain assessment:   24 hour (4194-9728) OME total: 133mg (oxy ir30mg + iv hydromorphone 4.4mg).    72 hour review OME: 10/4/24-86.5mg; 10/5/24- 117mg; 10/6/24- 117mg  Current pain: 7/10, described as sharp, located R side of abdomen, radiates to his back & up his back when severe.  He can no longer lay on his R side, worsens with touch/ movement, alleviated by opioids. Has feels most relief with IV hydromorphone.  Of note, he felt good relief on 10/6/24 when Oxy IR 10mg & IV hydromorphone 0.4mg were given nearly the same time.   Pain goal: 3-4/10.     Anxiety:  Ronen reports he has had a lot on his mind during this admission, he admits he is having difficulty sleeping & resting at night due to worrying about the future & his health.     See summary of discussion below.     Review of Systems:  Dyspnea: with exertion   Coughdenies  Nausea:denies  Appetite: poor, eating bites.    Pertinent items are noted in subjective.  Bowel Movement    No data found in the last 1 encounters.         Objective:     Vital Signs:  Blood pressure 136/75, pulse 69, temperature 97.6 °F (36.4 °C), temperature source Oral, resp. rate 19, height 6' 2\" (1.88 m), weight 172 lb 2.9 oz (78.1 kg), SpO2 99%.  Body mass index is 22.11 kg/m².  Present Level of pain: 7/10  Non-verbal signs of pain present: Yes- facial grimacing with movement   Current Palliative performance scale PPSv2 (%): 50    Physical Exam:  General: Alert & Awake. In no apparent respiratory distress. Body habitus Thin.  HEENT: AT/NC. No gross focal deficits. MMM   Cardiac: RRR  Lungs:  diminished at bases  on NC 4L  Abdomen: Soft,  non-distended, normal bowel sounds X 4 quadrants - not palpated per pt  request, as discomfort worsens with touch.   Extremities: Trace LE edema present  Neurologic: Alert and oriented to person, place, time, and situation   Psychiatric: Mood pleasant mood, anxious.   Skin: Warm and dry.        Results:     Hematology:  Lab Results   Component Value Date    WBC 11.9 (H) 10/07/2024    HGB 8.2 (L) 10/07/2024    HCT 25.7 (L) 10/07/2024    .0 10/07/2024     Coags:  Lab Results   Component Value Date    PT 20.4 (H) 01/30/2014    INR 2.15 (H) 10/04/2024    PTT 80.5 (H) 06/19/2024     Chemistry:  Lab Results   Component Value Date    CREATSERUM 0.46 (L) 10/07/2024    BUN 17 10/07/2024     10/07/2024    K 3.8 10/07/2024     10/07/2024    CO2 31.0 10/07/2024     (H) 10/07/2024    CA 7.8 (L) 10/07/2024    ALB 2.4 (L) 10/07/2024    ALKPHO 147 (H) 10/07/2024    BILT 0.8 10/07/2024    TP 4.9 (L) 10/07/2024    AST 27 10/07/2024    ALT 7 (L) 10/07/2024    DDIMER 0.38 12/19/2023    BNP 33 08/15/2013    MG 1.8 09/20/2024    PHOS 2.9 03/15/2024    TROP <0.046 07/18/2017     Imaging:  XR CHEST AP PORTABLE  (CPT=71045)    Addendum Date: 10/7/2024    ADDENDUM:  Loss of the distal the right 3rd rib redemonstrated.  Dictated by (CST): Ernesto Cabrera MD on 10/07/2024 at 8:14 AM     Finalized by (CST): Ernesto Cabrera MD on 10/07/2024 at 8:15 AM             Result Date: 10/7/2024  CONCLUSION:    Extensive areas of infiltrate upper and lower left lung stable.  Improving areas of infiltrate right mid-lower lung.  Small effusions suspected both sides.  Stable cardiac enlargement.  No definite pneumothorax.  LOCATION:  [id number]      Dictated by (CST): Ernesto Cabrera MD on 10/07/2024 at 8:13 AM     Finalized by (CST): Ernesto Cabrera MD on 10/07/2024 at 8:14 AM       CT CHEST (BSA=22223)    Result Date: 10/5/2024  CONCLUSION:   1.  Overall worsening of left greater than right ground-glass opacity consistent with worsening atypical infection/pneumonia or inflammatory pneumonitis within the  lungs.   2. New small bilateral pleural effusions and atelectasis at the right lung base.   3. Worsening fluid collection along the surface of the right lobe of the liver could represent bile collection.  4. Stable hepatic lesions may represent metastatic disease.   LOCATION:  [facility]   Dictated by (CST): Ovidio Patel MD on 10/05/2024 at 4:46 PM     Finalized by (CST): Ovidio Patel MD on 10/05/2024 at 4:52 PM           Summary of Discussion   I met with Ronen today, we talked & then added Alysha to the conversation via speakerphone. Ronen & Alysha both understand Ronen's worsening health.  Ronen is not sure he can handle more procedures, tests & expressed he wants to go home.  We discussed the options of ongoing treatment vs. Focusing on comfort/ hospice.  Ronen & Alysha both appreciated the discussion as it is difficult for them to bring the topic up to each other.  They both asked appropriate questions.  I explained they could start with a hospice informational session, then decide how they wish to proceed.  I encouraged them to have discussions with Dr. Foster and other members of his healthcare team.  Ronen expressed he is having a difficult time sleeping at night due to pain & feeling overwhelmed with all that is going on.    We discussed the option to maintain dignity & comfort at end of life with hospice care, and we discussed what the transition would look like.     In summary, Ronen more accepting how he wants his end of life to look & is willing to discuss his options. Continue to follow Ronen, and support Ronen & Alysha in his journey.    Advance Care Planning counseling and discussion:   HC POA Documentation Completed--Document in [employer]. Healthcare Agent's Name: Alysha Mcduffie   POL FORM Completed--Document signed and will be scanned, original & copies provided to Ronen.    DNAR/Selective Treatment    Assessment and Recommendations       Principal Problem:    Abdominal pain, acute  Active Problems:     Hypokalemia    Abnormal CT of the chest    Lower extremity edema    Abnormal finding of diagnostic imaging    Metastatic adenocarcinoma to esophagus (HCC)    Klebsiella pneumoniae sepsis (HCC)    Septic shock (HCC)    Acalculous cholecystitis    Severe malnutrition (HCC)    Metastatic adenocarcinoma (HCC)    Acute cholecystitis    Goals of care:    Ronen would like to discuss his options.   Ronen would like to hear from his care team what his treatment options are, and he knows they are limited.   Ronen & Alysha agreed to meet with hospice, and would like to meet 10/8/24. SW notified.  Code Status: DNAR/Selective Treatment  Healthcare Agent's Name: Alysha Mcduffie    Symptoms:  Pain:    - Increase Oxycodone IR 10mg q 6 scheduled & q3 PRN.   - IV hydromorphone 0.4mg q2h PRN- ONLY to use if po Oxy IR is ineffective.  I spoke with DANY Moreno to encourage Ronen to use po medication.  - Ronen is aware pain management would be more readily available at home should he decide to transition his care to hospice.   -oRnen has Oxy IR 10mg tablets, wife Alysha said they have #125 tablets available at home.  -Continue lyrica 25mg bid.  - continue baclofen    Anxiety:   - begin lorazepam 0.5mg at bedtime prn.   - give one time dose of lorazepam 0.5mg now due to anxiety from recent [facility] discussion.     Discussed today's visit with DANY Hardwick, wife, Dr Hernandez.    Palliative Care Follow Up: Palliative care team will Continue to follow while inpatient.  Palliative care follow up outpatient: TBD- Ronen is to meet with hospice then decide.     Thank you for allowing Palliative Care services to participate in the care of Dontae Cortez Jr..    A total of  75  minutes were spent on this follow up, which included all of the following: chart review, direct face to face contact, history taking, physical examination, counseling and coordinating care, and documentation.  >16 min of time spent discussing ACP    ARTEMIO Seymour, 10/07/24,  2:16 PM      Palliative Care Services    The 21st Century Cures Act makes medical notes like these available to patients in the interest of transparency. Please be advised this is a medical document. Medical documents are intended to carry relevant information, facts as evident, and the clinical opinion of the practitioner. The medical note is intended as peer to peer communication and may appear blunt or direct. It is written in medical language and may contain abbreviations or verbiage that are unfamiliar.

## 2024-10-07 NOTE — PROGRESS NOTES
[facility]   part of [facility]     Hospitalist Progress Note     Dontae Buddy Cortez . Patient Status:  Inpatient    10/15/1970 MRN [id number]   Location [facility] 4NW-A Attending Nixon Vergara MD   Hosp Day # 7 PCP Adrian Horowitz MD     Chief Complaint: abdominal pain    Subjective:     Complains of increasing right upper and abdominal pain 9-10 out of 10 pain today.  No  vomiting.  Has some nausea.  Afebrile.  Oxygen needs improving from yesterday, on 3 L of oxygen by nasal cannula    Objective:    Review of Systems:   A comprehensive review of systems was completed; pertinent positive and negatives stated in subjective.    Vital signs:  Temp:  [97.4 °F (36.3 °C)-97.8 °F (36.6 °C)] 97.6 °F (36.4 °C)  Pulse:  [67-71] 69  BP: (136-151)/(74-82) 136/75  SpO2:  [89 %-99 %] 99 %    Physical Exam:    /75   Pulse 69   Temp 97.6 °F (36.4 °C) (Oral)   Resp 19   Ht 6' 2\" (1.88 m)   Wt 172 lb 2.9 oz (78.1 kg)   SpO2 99%   BMI 22.11 kg/m²   On 3 L of oxygen by nasal cannula  General: No acute distress  Respiratory: No wheezes, no rhonchi  Cardiovascular: S1, S2, regular rate and rhythm  Abdomen: Soft, right upper quadrant tenderness, right-sided percutaneous cholecystostomy tube present draining bilious drainage, non-distended, positive bowel sounds  Neuro: No new focal deficits.   Extremities: No edema        Diagnostic Data:    Labs:  Recent Labs   Lab 10/01/24  0811 10/02/24  0431 10/03/24  0522 10/03/24  0743 10/04/24  0630 10/04/24  1257 10/05/24  0618 10/06/24  0623 10/07/24  0434   WBC  --  14.2* 9.3  --  10.2  --  10.4 12.1* 11.9*   HGB  --  8.7* 8.2*  --  8.3*  --  8.0* 8.6* 8.2*   MCV  --  92.0 94.1  --  90.7  --  92.5 93.8 93.1   PLT  --  203.0 156.0  --  171.0  --  163.0 184.0 197.0   BAND  --   --   --   --   --   --  1 1  --    INR 4.19* 2.60*  --  2.02* 2.20* 2.15*  --   --   --        Recent Labs   Lab 10/05/24  0619 10/06/24  0623 10/07/24  0434   GLU 91 110* 104*   BUN 15 15  17   CREATSERUM 0.44* 0.44* 0.46*   CA 8.1* 8.3* 7.8*   ALB 2.5* 2.5* 2.4*    137 137   K 3.7 4.3  4.3 3.8    104 103   CO2 28.0 27.0 31.0   ALKPHO 165* 160* 147*   AST 32 28 27   ALT 16 11 7*   BILT 1.0 0.9 0.8   TP 4.9* 5.1* 4.9*       Estimated Creatinine Clearance: 202.8 mL/min (A) (based on SCr of 0.46 mg/dL (L)).    No results for input(s): \"TROP\", \"TROPHS\", \"CK\" in the last 168 hours.    Recent Labs   Lab 10/03/24  0743 10/04/24  0630 10/04/24  1257   PTP 23.0* 24.6* 24.2*   INR 2.02* 2.20* 2.15*                  Microbiology    Hospital Encounter on 09/30/24   1. Anaerobic Culture     Status: None (Preliminary result)    Collection Time: 10/04/24  2:40 PM    Specimen: Bile; Body fluid, unspecified   Result Value Ref Range    Anaerobic Culture No Anaerobes to date N/A   2. Aerobic Bacterial Culture     Status: Abnormal    Collection Time: 10/04/24  2:40 PM    Specimen: Bile; Body fluid, unspecified   Result Value Ref Range    Aerobic Culture Result 1+ growth Klebsiella pneumoniae (A) N/A    Aerobic Culture Result 4+ growth Candida tropicalis (A) N/A    Aerobic Smear No WBCs seen N/A    Aerobic Smear No organisms seen N/A       Susceptibility    Klebsiella pneumoniae -  (no method available)     Ampicillin  Resistant      Ampicillin + Sulbactam <=2 Sensitive      Cefazolin <=4 Sensitive      Ciprofloxacin <=0.25 Sensitive      Gentamicin <=1 Sensitive      Meropenem <=0.25 Sensitive      Levofloxacin <=0.12 Sensitive      Piperacillin + Tazobactam <=4 Sensitive      Trimethoprim/Sulfa <=20 Sensitive    3. Blood Culture     Status: None    Collection Time: 10/01/24 12:19 PM    Specimen: Blood,peripheral   Result Value Ref Range    Blood Culture Result No Growth 5 Days N/A         Imaging: Reviewed in Epic.   CT chest done on 10/5/2024 shows worsening fluid collection along the surface of the right lobe of the liver, worsening left greater than right groundglass opacity, small bilateral pleural  effusion and atelectasis of the right lung base    Medications:    oxyCODONE  10 mg Oral 4 times per day    metRONIDAZOLE  500 mg Oral 2 times per day    fluconazole  400 mg Oral Daily    ceFAZolin  2 g Intravenous Q8H    baclofen  5 mg Oral BID    vancomycin  125 mg Oral Daily    apixaban  5 mg Oral BID    OLANZapine  5 mg Oral Nightly    pantoprazole  40 mg Oral Before breakfast    pregabalin  25 mg Oral BID    sertraline  200 mg Oral Daily       Assessment & Plan:      # Klebsiella pneumonia sepsis on admission with transient septic shock on 10/1/2024  #Abdominal pain, possible acalculous cholecystitis  -ESR, CRP elevated on admission  -On IV antibiotics-was on IV Zosyn on 9/30/2024, then on IV Rocephin,  which was then changed to IV Ancef per ID based on sensitivity  - transferred to ICU by my colleague Dr. Arshad on 10/1/2024 for hypotension and was on pressor support with Levophed.  ABG with lactate done on 10/1/2024 at the time of transfer to ICU showed lactic acid of 1.1.  Patient received 500 cc of normal saline bolus at the time of transfer to ICU on 10/1/2024, did not receive full sepsis bolus as patient already hypoxic requiring 2 L of oxygen by nasal cannula at that time. Patient did not receive 30ml/kg of fluids despite having: hypotension. The reason for doing so is: a concern for fluid overload. 500mL of fluids were given instead   - blood pressure improved on 10/2/2024 and weaned off of Levophed pressor support on a.m. of 10/2/2024.  Seen by palliative care -patient DNAR/selective treatment  US abdomen Doppler done on 10/1/2024 with normal zeinab in hepatic and portal veins.  Ultrasound abdomen done on 10/2/2024 shows trace perihepatic ascites and fluid demonstrating loculation and septations.  No significant fluid elsewhere within the abdomen.  GI and ID following.    HIDA scan positive -surgeon on consult ; status post IR cholecystostomy tube done by IR Dr. Mendez Myles MD on 10/4/2024  - CT chest  done on 10/5/2024 shows worsening fluid collection along the surface of the right lobe of the liver, worsening left greater than right groundglass opacity, small bilateral pleural effusion and atelectasis of the right lung base.  Pulmonary following      #Metastatic esophageal cancer with previous resection and gastroesophageostomy  # History of prior bronchoesophageal fistula  # pancreatic mets  #Liver and pulmonary mets  # Anemia secondary to chemotherapy and malignancy  - oncology following, has had outpatient chemotherapy     #H/o PE/DVT  Eliquis     #LE swelling, likely 3rd spacing, hypoalbuminemia     #Hypokalemia, replace PRN     #Cancer related pain, per palliative  ?celiac block for pain control was considered by GI during last admission according to patient  GI, oncology and palliative care following in hospital  Pain medications being adjusted by palliative care, discussed with palliative care APN     #HTN, controlled     #DL, statin    #PUD/GERD, PPi     #CAD with prev CABG    #Depression/anxiety, home meds resumed    #TREVOR, due to above, ATN, start gentle hydration/albumin, monitor    #Coagulopathy, due to liver mets, was also on DOAC    #Leukocytosis due to Klebsiella pneumoniae sepsis present on admission  -On IV antibiotics  -Follow CBC in a.m.     #Severe malnutrition-dietitian following     Discussed with patient.  Discussed with DANY Hernandez MD  10/7/2024            Supplementary Documentation:     Quality:  DVT Mechanical Prophylaxis:   SCDs, Early ambuation  DVT Pharmacologic Prophylaxis   Medication    heparin (Porcine) 100 Units/mL lock flush 500 Units    apixaban (Eliquis) tab 5 mg                Code Status: DNAR/Selective Treatment  Neumann: No urinary catheter in place  Neumann Duration (in days):   Central line:    SHUBHAM:     Discharge is dependent on: progress  At this point Mr. Cortez is expected to be discharge to: home    The 21st Century Cures Act makes medical notes like these  available to patients in the interest of transparency. Please be advised this is a medical document. Medical documents are intended to carry relevant information, facts as evident, and the clinical opinion of the practitioner. The medical note is intended as peer to peer communication and may appear blunt or direct. It is written in medical language and may contain abbreviations or verbiage that are unfamiliar.     Dietitian Malnutrition Assessment    Evaluation for Malnutrition: Criteria for severe malnutrition diagnosis- chronic illness related to   Wt loss greater than 5% in 1 month., Body fat severe depletion., Muscle mass severe depletion.               RD Malnutrition Care Plan: Adjusted diet to least restrictive to maximize intake., Encouraged increased PO intake., Encouraged small frequent meals with emphasis on high calorie/high protein.    Body Fat/Muscle Mass:          Physician Assessment     Patient has a diagnosis of severe malnutrition

## 2024-10-08 PROCEDURE — 99232 SBSQ HOSP IP/OBS MODERATE 35: CPT | Performed by: INTERNAL MEDICINE

## 2024-10-08 PROCEDURE — 99233 SBSQ HOSP IP/OBS HIGH 50: CPT | Performed by: INTERNAL MEDICINE

## 2024-10-08 RX ORDER — LORAZEPAM 0.5 MG/1
0.5 TABLET ORAL 2 TIMES DAILY PRN
Status: DISCONTINUED | OUTPATIENT
Start: 2024-10-08 | End: 2024-10-12

## 2024-10-08 NOTE — PROGRESS NOTES
[facility] Progress Note   Length of Stay: 8    Subjective:   -Met with hospice. Has not decided on a hospice agency yet.   -Throat is dry    Oncology History   -2018: Per report had a normal EGD and colonoscopy     -June 2022: He met with GI due to new onset dysphagia which started about 1 month prior to his visit.  He had an esophagram with a focal abrupt narrowing with irregular margins in the distal one third of the esophagus extending to the GE junction.  He also had an episode of food impaction. He has also lost about 15 lb. He was a heavy smoker from age 14 to about 40 (1.5 PPD). Also with alcohol use currently. Mother with a history of breast and colon cancer.  EGD and EUS with Dr. Yadav on 6/7/2022: Severe esophagitis with a concerning mass extending 37-39 centimeters from the incisors.  Nonobstructive mass.  By EUS uT3N1 disease.  Pathology showed invasive moderate to poorly differentiated adenocarcinoma.  HER2 amplified by FISH. CT CAP done at [facility] on 6/16/22: Results not loaded to PACs yet.  CA 19-9 from the same day was 107.4.  CEA normal. PET/CT on 6/20/2022: Increased distal esophageal uptake with an SUV of 14.4.  Concern for shreya metastases.     -Concurrent chemoradiation with carboplatin AUC2 plus paclitaxel 50 mg/m2: July 2022-August 2022 with  down (280-->30). Post treatment PET/CT showed improvement.      -Surgery with Dr. Lu on 9/30/22: ypT1b pN0. Recovery has been complicated by an esophageal leak,      -I met with him on 12/12/22. We discussed adjuvant opdivo for 1 year. His  was elevated to 48 and repeat was 64. CT CAP was recommended.      -He was admitted on 12/25/22 for abdominal pain. He had a POEM procedure done. He was also noted to have a RLL consolidation. He was seen by pulmonary, based on imaging an outpatient PET/CT was recommended and a biopsy of the most FDG avid lesion would be considered. Noted to have CAD and an outpatient  evaluation was recommended. Outpatient PET/CT done on 1/4/23 was reviewed in tumor board and there was no focal area of concern and adjuvant immunotherapy recommended.      -Adjuvant opdivo:01/2023-03/2023. PET/CT in in 04/2023 showed uptake in the pancreatic head and tail. Also with some uptake in liver. EGD/EUS on 4/27/23 with Dr. Ritchie: Pancreatic head mass positive for adenocarcinoma: Comparison to previous esophageal cancer shows similarity but IHC in non-specific. Her 2 IHC was 2+ on this specimen but FISH was negative.  Given discordant results on initial HER2 and follow-up HER2 we decided to proceed with chemotherapy with immunotherapy with HER2 directed therapy.     -Chemo + Herceptin + Immunotherapy: He received 7 cycles of FOLFOX + Herceptin + Immunotherapy (05/2023-09/2023). Imaging after C5 showed a favorable response. After C7, we held oxaliplatin due to neuropathy and he was started on maintenance herceptin + IO maintenance. Signatera was negative 09/11/23.     -Herceptin + IO Maintenance: He received 3 cycles from 09/25/23-10/31/23. Treatment was delayed due to hospitalizations.      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 Due to new findings-restarting FOLFOX discussed.      -admitted from 1/8/24-1/13/24 for persistent dysphagia and bronchesophageal stent.      -1/17/24: EGD with fistula closure with ASD occluder and removal of the bronchial stent.     -FOLFOX + Herceptin + Opdivo restarted: 3 cycles: 02/2024-03/2024     -Treatment delayed again for recurrent hospital admissions     -He underwent a Latissimus dorsi flap reconstruction for his bronchoesophageal fistula on  5/1/24     -PET/CT 5/15/24: at least 3 areas of uptake in the right hepatic lobe, uptake in pancrease and retroperitoneum. Post-operative uptake in chest wall.      -FOLFOX + Herceptin + Nivolumab Restarted: 6/11/2024 until 8/14/2024.  He received 5 cycles.  A PET scan after 5 cycles showed overall progression of metastatic disease with new gastrohepatic and peripancreatic lymph nodes along with enlargement of pancreatic and hepatic metastases.  There were also new lung nodules.  A CT-guided liver biopsy on 9/10/2024 was done which was positive for metastatic adenocarcinoma and the immunoprofile is compatible with the patient's esophageal primary. HER2 negative on FISH.     -He was admitted on 9/15/24 for abdominal pain. Labs showed elevated AST/ALT and T bili (6.7). CTA CAP showed progression of pancreatic and hepatic masses. There is intra-extra hepatic biliary ductal dilation. Subpleural reticular opacities noted-inflammatory or interstitial process? Hi Res CT recommended. He underwent an ERCP which showed a CBD stricture with biliary sphincterectomy and CBD, PD bile duct stent placement with Dr. Ritchie.     -Weekly paclitaxel 80 mg/m2: 3 weeks on 1 week off   -C1D1: 9/23/24:  42,110    ROS: 12 Point ROS completed and pertinent positives are above     Objective:    oxyCODONE  10 mg Oral 4 times per day    metRONIDAZOLE  500 mg Oral 2 times per day    fluconazole  400 mg Oral Daily    ceFAZolin  2 g Intravenous Q8H    baclofen  5 mg Oral BID    vancomycin  125 mg Oral Daily    apixaban  5 mg Oral BID    OLANZapine  5 mg Oral Nightly    pantoprazole  40 mg Oral Before breakfast    pregabalin  25 mg Oral BID    sertraline  200 mg Oral Daily       oxyCODONE    loperamide    LORazepam    calcium carbonate    benzonatate    ipratropium-albuterol    heparin    acetaminophen    melatonin    glycerin-hypromellose-    sodium chloride    ondansetron    prochlorperazine    [DISCONTINUED] HYDROmorphone **OR**  HYDROmorphone **OR** [DISCONTINUED] HYDROmorphone    Physical Exam  Vitals:    10/08/24 0407   BP: 141/76   Pulse: 73   Resp: 20   Temp: 97.2 °F (36.2 °C)     General: NAD, AOX3  HEENT: clear op, mmm, no jvd, no scleral icterus   CV: RRR S1S2 no murmurs, no edema  Lungs: CTAB, no increased work of breathing  Abd: soft nt nd +BS no hepatosplenomegaly  Neuro: CN: II-XII grossly intact    Labs/Imaging/Path:  Lab Results   Component Value Date    WBC 11.9 (H) 10/07/2024    HGB 8.2 (L) 10/07/2024    HCT 25.7 (L) 10/07/2024    MCV 93.1 10/07/2024    .0 10/07/2024       Recent Labs   Lab 10/05/24  0619 10/06/24  0623 10/07/24  0434   GLU 91 110* 104*   BUN 15 15 17   CREATSERUM 0.44* 0.44* 0.46*   CA 8.1* 8.3* 7.8*   ALB 2.5* 2.5* 2.4*    137 137   K 3.7 4.3  4.3 3.8    104 103   CO2 28.0 27.0 31.0   ALKPHO 165* 160* 147*   AST 32 28 27   ALT 16 11 7*   BILT 1.0 0.9 0.8   TP 4.9* 5.1* 4.9*       Imaging: Reviewed   CTA CAP on 9/30/24  There has been interval CBD stent placement.      Additionally, in the interim from the previous exam of 9/15/2024, there has been development of diffuse patchy ground-glass opacities within the lungs bilaterally, mild to moderate diffuse intra-abdominal ascites with mesenteric and omental stranding   diffusely as well as the development of subcutaneous edematous changes/stranding involving the entire visualized subcutaneous tissues of the chest, abdomen, pelvis and upper thighs.  Additionally, there has been interval increased gallbladder distension   with mild gallbladder wall thickening and diffuse pericholecystic fluid and fat stranding.  There is also interval development of colonic wall thickening involving the transverse to rectosigmoid colon.      Given the multiple interval changes, the appearance would suggest an acute, diffuse inflammatory process.      The previously identified metastatic lesions of the liver and pancreas appear relatively stable.      No  pulmonary emboli noted.     Assessment and Plan:   Goals of care: Discussion on 10/8.24: We discussed that his treatment is not curative and only palliative. Unfortunately, he has had recurrent hospitalizations for multiple issues which have delayed his chemotherapy. His tolerance to chemotherapy is also starting to decline significantly. We discussed the limited efficacy and toxicity of his remaining treatment options (I.e. taxol, FOLFIRI). Given the risks, benefits, his overall performance status and goals of care, I recommended that he consider hospice care. Discussing hospice options with his wife.     Klebsiella pneumoniae Bacteremia: ID following. Possibly from recent CBD stent.    Hypoxic respiratory failure: on 02. Noted to have pulmonary infiltrates, small pleural effusions. Pulmonary is following     Ascites: repeat US with trace ascites so para canceled.     Metastatic esophageal cancer  -Most recent imaging is consistent with metastatic disease progression.  Repeat HER2 is negative on FISH. We discussed that he will not be a good candidate for Cyramza due to history of esophageal fistula and PE/DVT. He is on weekly paclitaxel and received cycle 1 day 1 on 9/23/24. Treatment on hold.      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 CTA from 09/2024 shows resolution.     Coagulopathy: Note that INR elevation is likely from Eliquis and nutritional deficit. S/p one dose of Vit K prior to procedure. .      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 He is s/p ERCP on 9/17/24 with CBD, PD stent placement.      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 Now s/p repair 5/1/24.     Appreciate palliative care consultation     [facility]

## 2024-10-08 NOTE — PROGRESS NOTES
[facility]  Progress Note    Dontae Buddy Cortez . Patient Status:  Inpatient    10/15/1970 MRN [id number]   Location [facility] 4NW-A Attending Sakina Hernandez MD   Hosp Day # 8 PCP Adrian Horowitz MD     Subjective:  No new complaints    Physical Exam:    Vital Signs:    Blood pressure 133/73, pulse 86, temperature 97.2 °F (36.2 °C), temperature source Oral, resp. rate 19, height 74\", weight 172 lb 2.9 oz (78.1 kg), SpO2 96%.    Intake/Output Summary (Last 24 hours) at 10/8/2024 1438  Last data filed at 10/8/2024 1300  Gross per 24 hour   Intake 280 ml   Output 45 ml   Net 235 ml     I/O this shift:  In: 280 [P.O.:280]  Out: 20 [Drains:20]  Lungs: Clear to auscultation bilaterally.  Cardiac: Regular rate and rhythm. No murmur.  Abdomen:  Soft, some residual tenderness in the right upper quadrant.  Tenderness near cholecystostomy tube, no percussion tenderness or guarding, no peritoneal signs.  Cholecystostomy tube in place-bilious drainage    Labs:  Recent Labs   Lab 10/05/24  0618 10/06/24  0623 10/07/24  0434   RBC 2.65* 2.89* 2.76*   HGB 8.0* 8.6* 8.2*   HCT 24.5* 27.1* 25.7*   MCV 92.5 93.8 93.1   MCH 30.2 29.8 29.7   MCHC 32.7 31.7 31.9   RDW 15.0 14.8 14.8   NEPRELIM 8.37* 10.66* 10.19*   WBC 10.4 12.1* 11.9*   .0 184.0 197.0     Recent Labs   Lab 10/05/24  0619 10/06/24  0623 10/07/24  043   GLU 91 110* 104*   BUN 15 15 17   CREATSERUM 0.44* 0.44* 0.46*   CA 8.1* 8.3* 7.8*   ALB 2.5* 2.5* 2.4*    137 137   K 3.7 4.3  4.3 3.8    104 103   CO2 28.0 27.0 31.0   ALKPHO 165* 160* 147*   AST 32 28 27   ALT 16 11 7*   BILT 1.0 0.9 0.8   TP 4.9* 5.1* 4.9*         Chief Complaint:     Stage IV esophageal carcinoma now with acute cholecystitis on HIDA scan      Assessment/Plan:   Patient is status post cholecystostomy tube.  He has noted some improvement in his epigastric abdominal pain.  He is tolerating diet without nausea or vomiting.  No indication for cholecystectomy due to  multiple comorbidities including metastatic esophageal carcinoma.  General surgery service will sign off at this time.  Please call if needed.      DAVID Blount MD, FACS      Please note that this report has been produced using speech recognition software and may contain errors related to that system including but not limited to errors in grammar, punctuation and spelling as well as words and phrases that possibly may have been recognized inappropriately.  If there are any questions or concerns please contact the dictating provider for clarification.  The 21st Century Cures Act makes medical notes like these available to patients in the interest of transparency. Please be advised this is a medical document. Medical documents are intended to carry relevant information, facts as evident, and the clinical opinion of the practitioner. The medical note is intended as peer to peer communication and may appear blunt or direct. It is written in medical language and may contain abbreviations or verbiage that are unfamiliar.  If there are any questions or concerns please contact the dictating provider for clarification.

## 2024-10-08 NOTE — DIETARY NOTE
[facility]   part of [facility]    NUTRITION ASSESSMENT    Pt meets severe malnutrition criteria at this time.    CRITERIA FOR MALNUTRITION DIAGNOSIS:  Criteria for severe malnutrition diagnosis: chronic illness related to wt loss greater than 5% in 1 month, body fat severe depletion, and muscle mass severe depletion      NUTRITION INTERVENTION:    RD nutrition Care Plan- Adjusted diet to least restrictive to maximize intake, Encouraged increased PO intake, and Encouraged small frequent meals with emphasis on high calorie/high protein  Meal and Snacks - Monitor and encourage adequate PO intake.   Medical Food Supplements - Pt not interested at this time. Rationale/use for oral supplements discussed.  Nutrition Education - discussed nutrient dense foods, shakes and smoothies at home, more frequent meals. Pt/family receptive to education, no barriers noted. Handouts provided.       PATIENT STATUS:   10/8 -  pt busy with other staff at time of RD visit.  Po intake remains poor. Receiving pain meds for abdominal pain.  Pt and wife meeting with hospice today to discuss GOC.    10/4 -  pt not in room at time of RD visit.  Pt having procedure, plan for sofy.cystotomy tube. Pt transferred to ICU for lethargy, hypotension on 10/1 and then transferred back to floor 10/2.  Pt having abdominal pain and decreased intake.  Encourage po when able     10/01/24 at risk  53 year old male admitted with abdominal pain and swelling in legs.  Pt with metastatic esophageal and pancreatic cancer.  Met with pt and wife who report decreased intake. He eats small amounts at meal times. He does not like ONS. He has severe muscle and fat depletion.  Pt also with lower extremity edema.        ANTHROPOMETRICS:  Ht: 188 cm (6' 2\")  Wt: 78.1 kg (172 lb 2.9 oz).   BMI: Body mass index is 22.11 kg/m².  IBW: 86.4 kg      WEIGHT HISTORY:   Weight loss: Yes, Severe Wt loss of 12 lbs, 7%, over 1 months     Wt Readings from Last 10 Encounters:    10/02/24 78.1 kg (172 lb 2.9 oz)   09/23/24 73.5 kg (162 lb)   09/15/24 70.3 kg (155 lb)   09/06/24 76.2 kg (168 lb)   08/19/24 76.2 kg (168 lb)   08/14/24 76.4 kg (168 lb 8 oz)   08/02/24 74.2 kg (163 lb 9.6 oz)   07/29/24 76.9 kg (169 lb 8 oz)   07/15/24 74.4 kg (164 lb)   07/09/24 73.5 kg (162 lb)        NUTRITION:  Diet:       Procedures    Regular/General diet Is Patient on Accuchecks? No      Food Allergies: No  Cultural/Ethnic/Druze Preferences Addressed: Yes    Percent Meals Eaten (last 3 days)       Date/Time Percent Meals Eaten (%)    10/08/24 1000 100 %     Percent Meals Eaten (%): 1 cup of yougart only at 10/08/24 1000            GI system review: WNL Last BM Date: 10/06/24  Skin and wounds: none    NUTRITION RELATED PHYSICAL FINDINGS:     1. Body Fat/Muscle Mass: severe depletion body fat Orbital fat pad and Buccal fat pad and severe muscle depletion Temple region and Clavicle region     2. Fluid Accumulation: lower extremity edema     NUTRITION PRESCRIPTION: 70.8kg  Calories: 2843-7568 calories/day (25-30 kcal/kg)  Protein:  grams protein/day (1.2-1.5 grams protein per kg)  Fluid: ~1 ml/kcal or per MD discretion    NUTRITION DIAGNOSIS/PROBLEM:  Malnutrition related to physiological causes as evidenced by documented/reported insufficient oral intake, documented/reported unintentional weight loss, loss of fat mass, and loss of muscle mass      MONITOR AND EVALUATE/NUTRITION GOALS:  PO intake of 75% of meals TID - Not met, Continues  Weight stable within 1 to 2 lbs during admission - Ongoing      MEDICATIONS:  Reviewed    LABS:  Reviewed    Pt is at High nutrition risk        Juhi Valentino RD,LDN  Clinical Dietitian  [id number]

## 2024-10-08 NOTE — HOSPICE RN NOTE
Addendum: 3:45pm: Followed up with pt/wife at bedside today after mtg with [facility]. Still undecided but wife reports they are leaning toward [facility]. Requested no formal f/u with RH. Wife has RH numbers if needed. RH will follow peripherally through milton Malone LCSW updated in person.     [facility] met with patient and wife at bedside. RH discussed hospice benefit, goals of care, levels of hospice care, medications used to treat symptoms related to end of life, revocation, medicare coverage. All questions encouraged and answered. Care companion book reviewed and left with family. Wife tearful at times throughout visit. Wife shared frustration and feeling overwhelmed with limitations in insurance coverage. Pt would most likely qualify for GIP level of care for acute symptom management for pain but that would likely be short term once pain medications adjusted/symptom management achieved. Pt is currently on scheduled PO oxycodone 10mg, 4 times daily but is still needing IVP dilaudid 0.4mg every 2-3hrs prn. Pt rated his pain during visit 8/10. Pt/wife plan on meeting with [facility] at 1pm today and agreeable to RH follow up at 3:30pm today in room. RH numbers provided if needed sooner, please call. POC discussed with DANY Moreno and LVM for RADHA Malone to provide update.       Please call with concerns or questions.     Katie Lee RN, BSN  [facility] Nurse Liaison  Office: [phone] or After Hours: [phone]

## 2024-10-08 NOTE — PAYOR COMM NOTE
--------------  CONTINUED STAY REVIEW    Payor: [employer]  Subscriber #:  [id number]  Authorization Number: [id number]    Admit date: 9/30/24  Admit time: 10:48 AM    REVIEW DOCUMENTATION:   10-5-24    methylPREDNISolone sodium succinate (Solu-MEDROL) injection 60 mg  Dose: 60 mg  Freq: Once Route: IV  Start: 10/05/24 1900 End:  potassium chloride 20 mEq/100mL IVPB premix 20 mEq  Dose: 20 mEq  Freq: Once Route: IV  Last Dose: 20 mEq (10/05/24 1119)  Start: 10/05/24 1030 End: 10/05/24 1319  HYDROmorphone (Dilaudid) 1 MG/ML injection 0.4 mg  Dose: 0.4 mg  Freq: Every 2 hour PRN Route: IV  PRN Reason: moderate pain  Start: 09/30/24 1233  X10    Status post cholecystostomy tube placed on 10/4/2024 right upper quadrant abdominal pain improving to 7 out of 10 pain today.  No nausea vomiting.      Temp:  [97.5 °F (36.4 °C)-98.3 °F (36.8 °C)] (P) 97.5 °F (36.4 °C)  Pulse:  [67-87] 67  Resp:  [16-20] (P) 20  BP: (142-148)/(76-89) 143/80  SpO2:  [91 %-94 %] 94 %     Physical Exam:    /80 (BP Location: Left arm)   Pulse 67   Temp (P) 97.5 °F (36.4 °C) (Oral)   Resp (P) 20   Ht 6' 2\" (1.88 m)   Wt 172 lb 2.9 oz (78.1 kg)   SpO2 94%   BMI 22.11 kg/m²      General: No acute distress  Respiratory: No wheezes, no rhonchi  Cardiovascular: S1, S2, regular rate and rhythm  Abdomen: Soft, right upper quadrant tenderness, right-sided percutaneous cholecystostomy tube present draining bilious drainage, non-distended, positive bowel sounds  Neuro: No new focal deficits.   Extremities: No edema    Assessment & Plan:  # Klebsiella pneumonia sepsis on admission with transient septic shock on 10/1/2024  #Abdominal pain, possible acalculous cholecystitis  -ESR, CRP elevated on admission  -On IV antibiotics-was on IV Zosyn on 9/30/2024, then on IV Rocephin,  which was then changed to IV Ancef per ID based on sensitivity  - transferred to ICU by my colleague Dr. Arshad on 10/1/2024 for hypotension and was on  pressor support with Levophed.  ABG with lactate done on 10/1/2024 at the time of transfer to ICU showed lactic acid of 1.1.  Patient received 500 cc of normal saline bolus at the time of transfer to ICU on 10/1/2024, did not receive full sepsis bolus as patient already hypoxic requiring 2 L of oxygen by nasal cannula at that time. Patient did not receive 30ml/kg of fluids despite having: hypotension. The reason for doing so is: a concern for fluid overload. 500mL of fluids were given instead   - blood pressure improved on 10/2/2024 and weaned off of Levophed pressor support on a.m. of 10/2/2024.  Seen by palliative care -patient DNAR/selective treatment  US abdomen Doppler done on 10/1/2024 with normal zeinab in hepatic and portal veins.  Ultrasound abdomen done on 10/2/2024 shows trace perihepatic ascites and fluid demonstrating loculation and septations.  No significant fluid elsewhere within the abdomen.  GI and ID following.    HIDA scan positive -surgeon on consult ; status post IR cholecystostomy tube done by IR Dr. Mendez Myles MD on 10/4/2024     #Metastatic esophageal cancer with previous resection and gastroesophageostomy  # History of prior bronchoesophageal fistula  # pancreatic mets  #Liver and pulmonary mets  # Anemia secondary to chemotherapy and malignancy  - oncology following, has had outpatient chemotherapy     #H/o PE/DVT  Eliquis     #LE swelling, likely 3rd spacing, hypoalbuminemia     #Hypokalemia, replace PRN     #Cancer related pain, per palliative  ?celiac block for pain control was considered by GI during last admission according to patient  GI following in hospital     #HTN, controlled     #DL, statin    #PUD/GERD, PPi     #CAD with prev CABG    #Depression/anxiety, home meds resumed     #TREVOR, due to above, ATN, start gentle hydration/albumin, monitor     #Coagulopathy, due to liver mets, was also on DOAC    #Leukocytosis due to Klebsiella pneumoniae sepsis present on admission  -On IV  antibiotics  -Follow CBC in a.m.     #Severe malnutrition-dietitian following       REVIEW DOCUMENTATION:   10-6-24    HYDROmorphone (Dilaudid) 1 MG/ML injection 0.4 mg  Dose: 0.4 mg  Freq: Every 2 hour PRN Route: IV  PRN Reason: moderate pain  Start: 09/30/24 1233  X5     CT chest completed yesterday revealed worsening left greater than right atypical infection/pneumonia. There are small bilateral pleural effusions and atelectasis at right lung base. There is worsening fluid collection along the surface of the right lobe of the liver.    Requiring supplemental oxygen 6 L/min via high flow nasal cannula and oxygen saturations are 93 to 96%    Pulmonary:      Effort: Pulmonary effort is normal. No respiratory distress.      Breath sounds: Normal breath sounds. No wheezing or rhonchi.      Comments: Diminished breath sounds in bilateral lower lobes.  Crackles in left lower lung base        Worsening leukocytosis, WBC 10.4--> 12.1. Hemoglobin improved 8.0 -->8.6    Objective/Physical Exam:  /82 (BP Location: Right arm)   Pulse 62   Temp 98.6 °F (37 °C) (Oral)   Resp 18   Ht 74\"   Wt 172 lb 2.9 oz (78.1 kg)   SpO2 97%   BMI 22.11 kg/m²     PPD 2 IR cholecystotomy tube placement  Acute acalculous cholecystitis  Esophageal cancer with metastases to the liver      Plan:  Wean FiO2 off to keep oxygen saturation between 90% to 92%  Cholecystostomy tube placement per interventional radiology today  Use flutter device every 4 hours as possible to help cough out secretions  Incentive spirometry every 4 hours as possible  DuoNebs every 6 hours as needed  Continue current antibiotics  Infectious disease follow-up  Lasix 40 mg IV x 1  Eliquis on hold  DVT prophylaxis: SCD boots   GI prophylaxis: ---  Will follow for further recommendations  Follow labs,CRP and chest x-ray in a.m.  Oncology follow-up      REVIEW DOCUMENTATION:   10-7-24      HYDROmorphone (Dilaudid) 1 MG/ML injection 0.4 mg  Dose: 0.4 mg  Freq: Every 2  hour PRN Route: IV  PRN Reason: moderate pain  Start: 09/30/24 1233  X2    Ongoing cough productive of intermittent amounts of yellow to green light no blood     Physical Exam:               General: alert, cooperative, oriented.  No respiratory distress.  Supine  Ongoing abdominal pain               Head: Normocephalic, without obvious abnormality, atraumatic.               Throat: Lips, mucosa, and tongue normal.  No thrush noted.               Neck: trachea midline, no adenopathy, no thyromegaly.                Lungs: Bilateral rales mild rare sense of rhonchi               Chest wall: No tenderness or deformity.               Heart: Regular rate and rhythm               Abdomen: soft, non-distended, no masses, no guarding, no                       Rebound.  dressed drained mildly tender protuberant               Extremity: Mild edema               Skin: No rashes or lesions.               Neurological: Alert, interactive, no focal deficits     Lab Data Review:        Recent Labs     10/05/24  0618 10/06/24  0623 10/07/24  0434   WBC 10.4 12.1* 11.9*   HGB 8.0* 8.6* 8.2*   .0 184.0 197.0            Recent Labs     10/05/24  0619 10/06/24  0623 10/07/24  0434    137 137   K 3.7 4.3  4.3 3.8    104 103   CO2 28.0 27.0 31.0   BUN 15 15 17   CREATSERUM 0.44* 0.44* 0.46*       CXR  CONCLUSION: Extensive areas of infiltrate upper and lower left lung stable. Improving areas of infiltrate right mid-lower lung. Small effusions suspected both sides. Stable cardiac enlargement. No definite pneumothorax.     Assessment:  Acute hypoxic respiratory failure: Improved but remains on O2  Klebsiella pneumoniae bacteremia/from drain as well- suspected due to acute cholecystitis:  Pulmonary infiltrates-some waxing and waning and hypoxia is improving-following on antibiotics -following for need for steroids  Bilateral small pleural effusion/increasing fluid collection along liver  Nonocclusive pulmonary embolism  and left upper lobe and left lower lobe branches of pulmonary artery  Mediastinal adenopathy with conglomeration of AP window lymph nodes 2.5 x 2.4 cm  Esophageal cancer status postchemotherapy received paclitaxel 2 weeks ago-  History of fistula postop  History of pulmonary nodules.     Plan:  Now on Eliquis  Following on antibiotics  Serial chest x-rays  Continuing to wean FiO2 as able        MEDICATIONS ADMINISTERED IN LAST 1 DAY:  apixaban (Eliquis) tab 5 mg       Date Action Dose Route User    10/8/2024 0809 Given 5 mg Oral Josh Ghosh RN    10/7/2024 2020 Given 5 mg Oral Estrellita Brown RN    10/7/2024 0955 Given 5 mg Oral Josh Ghosh RN          baclofen (Lioresal) tab 5 mg       Date Action Dose Route User    10/8/2024 0809 Given 5 mg Oral Josh Ghosh RN    10/7/2024 2020 Given 5 mg Oral Estrellita Brown RN    10/7/2024 0955 Given 5 mg Oral Josh Ghosh RN          ceFAZolin (Ancef) 2g in 10mL IV syringe premix       Date Action Dose Route User    10/8/2024 0406 New Bag 2 g Intravenous Trudy Blanc RN    10/7/2024 2020 New Bag 2 g Intravenous Estrellita Brown RN    10/7/2024 1131 New Bag 2 g Intravenous Josh Ghosh RN          fluconazole (Diflucan) tab 400 mg       Date Action Dose Route User    10/7/2024 1657 Given 400 mg Oral Josh Ghosh RN          HYDROmorphone (Dilaudid) 1 MG/ML injection 0.4 mg       Date Action Dose Route User    10/7/2024 1337 Given 0.4 mg Intravenous Josh Ghosh RN    10/7/2024 1005 Given 0.4 mg Intravenous Josh Ghosh RN          LORazepam (Ativan) tab 0.5 mg       Date Action Dose Route User    10/7/2024 2024 Given 0.5 mg Oral Estrellita Brown RN          LORazepam (Ativan) tab 0.5 mg       Date Action Dose Route User    10/7/2024 1236 Given 0.5 mg Oral Haberkorn, Josh, RN          metRONIDAZOLE (Flagyl) tab 500 mg       Date Action Dose Route User    10/8/2024 0809 Given 500 mg Oral Josh Ghosh RN    10/7/2024 2021 Given 500 mg Oral  Estrellita Brown RN          OLANZapine (ZyPREXA) tab 5 mg       Date Action Dose Route User    10/7/2024 2020 Given 5 mg Oral Estrellita Brown RN          oxyCODONE immediate release tab 10 mg       Date Action Dose Route User    10/8/2024 0651 Given 10 mg Oral Trudy Blanc RN    10/8/2024 0000 Given 10 mg Oral Estrellita Brown RN    10/7/2024 1657 Given 10 mg Oral Josh Ghosh RN    10/7/2024 1130 Given 10 mg Oral Josh Ghosh RN          oxyCODONE immediate release tab 10 mg       Date Action Dose Route User    10/8/2024 0809 Given 10 mg Oral Josh Ghosh RN    10/8/2024 0404 Given 10 mg Oral Trudy Blanc RN    10/7/2024 2240 Given 10 mg Oral Estrellita Brown RN    10/7/2024 1940 Given 10 mg Oral Josh Ghosh RN    10/7/2024 1238 Given 10 mg Oral Josh Ghosh RN          pantoprazole (Protonix) DR tab 40 mg       Date Action Dose Route User    10/8/2024 0651 Given 40 mg Oral Trudy Blanc RN          pregabalin (Lyrica) cap 25 mg       Date Action Dose Route User    10/8/2024 0811 Given 25 mg Oral Josh Ghosh RN    10/7/2024 2020 Given 25 mg Oral Estrellita Brown RN    10/7/2024 0955 Given 25 mg Oral Josh Ghosh RN          sertraline (Zoloft) tab 200 mg       Date Action Dose Route User    10/8/2024 0809 Given 200 mg Oral Josh Ghosh RN    10/7/2024 0955 Given 200 mg Oral Josh Ghosh RN          vancomycin (Vancocin) cap 125 mg       Date Action Dose Route User    10/8/2024 0809 Given 125 mg Oral Josh Ghosh RN    10/7/2024 0955 Given 125 mg Oral Josh Ghosh RN            Vitals (last day)       Date/Time Temp Pulse Resp BP SpO2 Weight O2 Device O2 Flow Rate (L/min) [facility]    10/08/24 0407 97.2 °F (36.2 °C) 73 20 141/76 94 % -- High flow nasal cannula 7 L/min EK    10/07/24 2126 97.5 °F (36.4 °C) 73 20 145/87 94 % -- Nasal cannula 3 L/min EK    10/07/24 1727 98.3 °F (36.8 °C) 78 -- 147/77 95 % -- Nasal cannula 3 L/min ES    10/07/24 1500 -- 67 -- -- 89  % -- -- -- ES    10/07/24 1100 97.6 °F (36.4 °C) 69 -- 136/75 99 % -- Nasal cannula 3 L/min ES    10/07/24 0735 97.8 °F (36.6 °C) 70 -- 140/74 93 % -- Nasal cannula 3 L/min ES    10/07/24 0442 -- -- -- -- 89 % -- Nasal cannula 3 L/min NOEMI    10/07/24 0434 -- -- -- -- 92 % -- Nasal cannula 2 L/min NOEMI    10/07/24 0340 97.4 °F (36.3 °C) 67 -- 136/78 98 % -- -- -- PW

## 2024-10-08 NOTE — CM/SW NOTE
Pt/wife [facility] with both hosp[facility] agencies[facility].  Wife is completely overwhelmed with her situation and is not sure at this time what they want to do.  They would like more time to decide.

## 2024-10-08 NOTE — PLAN OF CARE
Pt A/O x4. VSS. Afebrile. Pt c/o pain throughout day. PRN's and scheduled medications admin per MAR w/ some relief. Pt encouraged to ambulate in room w/ assist. Pt encouraged to ambulate to chair. Pt repositioned routinely. Waffle cushion and pillow support provided. R sofy drain CDI. CXR ordered and completed. Pt's sacral dressing changed per wound care order, CDI. Pt and spouse met w/ residential and [facility] hospice today. Pt and spouse wanting time to make decision. Pt's O2 desat this evening to 80% on 4L-O2 needs increased to 9L HFNC for brief moment before weaning down to 4L w/ O2 sat remaining at 93%. MD notified, no new orders. Comfort measures provided. Fall and safety precautions in place. Call light within reach.

## 2024-10-08 NOTE — DIETARY NOTE
[facility]   part of [facility]    NUTRITION ASSESSMENT    Pt meets severe malnutrition criteria at this time.    CRITERIA FOR MALNUTRITION DIAGNOSIS:  Criteria for severe malnutrition diagnosis: chronic illness related to wt loss greater than 5% in 1 month, body fat severe depletion, and muscle mass severe depletion      NUTRITION INTERVENTION:    RD nutrition Care Plan- Adjusted diet to least restrictive to maximize intake, Encouraged increased PO intake, and Encouraged small frequent meals with emphasis on high calorie/high protein  Meal and Snacks - Monitor and encourage adequate PO intake.   Medical Food Supplements - Pt not interested at this time. Rationale/use for oral supplements discussed.  Nutrition Education - discussed nutrient dense foods, shakes and smoothies at home, more frequent meals. Pt/family receptive to education, no barriers noted. Handouts provided.       PATIENT STATUS:   10/8 -  pt busy with other staff at time of RD visit.  Po intake remains poor not meeting nutritional needs.  Receiving pain meds for abdominal pain.  Pt and wife meeting with hospice today to discuss GOC.    10/4 -  pt not in room at time of RD visit.  Pt having procedure, plan for sofy.cystotomy tube. Pt transferred to ICU for lethargy, hypotension on 10/1 and then transferred back to floor 10/2.  Pt having abdominal pain and decreased intake.  Encourage po when able     10/01/24 at risk  53 year old male admitted with abdominal pain and swelling in legs.  Pt with metastatic esophageal and pancreatic cancer.  Met with pt and wife who report decreased intake. He eats small amounts at meal times. He does not like ONS. He has severe muscle and fat depletion.  Pt also with lower extremity edema.        ANTHROPOMETRICS:  Ht: 188 cm (6' 2\")  Wt: 78.1 kg (172 lb 2.9 oz).   BMI: Body mass index is 22.11 kg/m².  IBW: 86.4 kg      WEIGHT HISTORY:   Weight loss: Yes, Severe Wt loss of 12 lbs, 7%, over 1 months     Wt  Readings from Last 10 Encounters:   10/02/24 78.1 kg (172 lb 2.9 oz)   09/23/24 73.5 kg (162 lb)   09/15/24 70.3 kg (155 lb)   09/06/24 76.2 kg (168 lb)   08/19/24 76.2 kg (168 lb)   08/14/24 76.4 kg (168 lb 8 oz)   08/02/24 74.2 kg (163 lb 9.6 oz)   07/29/24 76.9 kg (169 lb 8 oz)   07/15/24 74.4 kg (164 lb)   07/09/24 73.5 kg (162 lb)        NUTRITION:  Diet:       Procedures    Regular/General diet Is Patient on Accuchecks? No      Food Allergies: No  Cultural/Ethnic/Judaism Preferences Addressed: Yes    Percent Meals Eaten (last 3 days)       Date/Time Percent Meals Eaten (%)    10/08/24 1000 100 %     Percent Meals Eaten (%): 1 cup of yougart only at 10/08/24 1000            GI system review: WNL Last BM Date: 10/06/24  Skin and wounds: none    NUTRITION RELATED PHYSICAL FINDINGS:     1. Body Fat/Muscle Mass: severe depletion body fat Orbital fat pad and Buccal fat pad and severe muscle depletion Temple region and Clavicle region     2. Fluid Accumulation: lower extremity edema     NUTRITION PRESCRIPTION: 70.8kg  Calories: 0879-4722 calories/day (25-30 kcal/kg)  Protein:  grams protein/day (1.2-1.5 grams protein per kg)  Fluid: ~1 ml/kcal or per MD discretion    NUTRITION DIAGNOSIS/PROBLEM:  Malnutrition related to physiological causes as evidenced by documented/reported insufficient oral intake, documented/reported unintentional weight loss, loss of fat mass, and loss of muscle mass      MONITOR AND EVALUATE/NUTRITION GOALS:  PO intake of 75% of meals TID - Not met, Continues  Weight stable within 1 to 2 lbs during admission - Ongoing      MEDICATIONS:  Reviewed    LABS:  Reviewed    Pt is at High nutrition risk        Juhi Valentino RD,LDN  Clinical Dietitian  [id number]

## 2024-10-08 NOTE — PROGRESS NOTES
INFECTIOUS DISEASE  PROGRESS NOTE            [facility]    Dontae Buddy Diego Champion Patient Status:  Inpatient    10/15/1970 MRN [id number]   [facility] 4SW-A Attending Sakina Hernandez MD   Hosp Day # 8 PCP Adrian Horowitz MD     Antibiotics: #8  Cefazolin #7  Metronidazole #5  Fluconazole #2    Subjective:  comfortable    Objective:  Temp:  [97.2 °F (36.2 °C)-98.3 °F (36.8 °C)] 97.2 °F (36.2 °C)  Pulse:  [67-78] 73  Resp:  [20] 20  BP: (136-147)/(74-87) 141/76  SpO2:  [89 %-99 %] 94 %    General: awake alert  Vital signs:Temp:  [97.2 °F (36.2 °C)-98.3 °F (36.8 °C)] 97.2 °F (36.2 °C)  Pulse:  [67-78] 73  Resp:  [20] 20  BP: (136-147)/(74-87) 141/76  SpO2:  [89 %-99 %] 94 %  HEENT: Moist mucous membranes. Extraocular muscles are intact.  Neck: supple no masses  Respiratory: Non labored, symmetric excursion, normal respirations  Port intact  Cardiovascular: no irregularities in rhythm  Abdomen, non distended, drain in place  Musculoskeletal: joints: no swelling   Integument: No lesions. No erythema. No open wounds.  Labs:     COVID-19 Lab Results    COVID-19  Lab Results   Component Value Date    COVID19 Not Detected 2024    COVID19 Not Detected 2024    COVID19 Not Detected 2024       Pro-Calcitonin  Recent Labs   Lab 10/06/24  0623   PCT 0.33*       Cardiac  No results for input(s): \"TROP\", \"PBNP\" in the last 168 hours.    Creatinine Kinase  No results for input(s): \"CK\" in the last 168 hours.    Inflammatory Markers  Recent Labs   Lab 10/02/24  1035 10/07/24  0434   CRP  --  7.90*   MYAH 791*  --        Recent Labs   Lab 10/07/24  0434   RBC 2.76*   HGB 8.2*   HCT 25.7*   MCV 93.1   MCH 29.7   MCHC 31.9   RDW 14.8   NEPRELIM 10.19*   WBC 11.9*   .0   NEUT 92   LYMPH 1   MON 5   EOS 1         Recent Labs   Lab 10/05/24  0619 10/06/24  0623 10/07/24  0434   GLU 91 110* 104*   BUN 15 15 17   CREATSERUM 0.44* 0.44* 0.46*   CA 8.1* 8.3* 7.8*   ALB 2.5* 2.5* 2.4*    137  137   K 3.7 4.3  4.3 3.8    104 103   CO2 28.0 27.0 31.0   ALKPHO 165* 160* 147*   AST 32 28 27   ALT 16 11 7*   BILT 1.0 0.9 0.8   TP 4.9* 5.1* 4.9*       No results found for: \"VANCT\"  Microbiology    Hospital Encounter on 09/30/24   1. Anaerobic Culture     Status: None (Preliminary result)    Collection Time: 10/04/24  2:40 PM    Specimen: Bile; Body fluid, unspecified   Result Value Ref Range    Anaerobic Culture No Anaerobes to date N/A   2. Aerobic Bacterial Culture     Status: Abnormal    Collection Time: 10/04/24  2:40 PM    Specimen: Bile; Body fluid, unspecified   Result Value Ref Range    Aerobic Culture Result 1+ growth Klebsiella pneumoniae (A) N/A    Aerobic Culture Result 4+ growth Candida tropicalis (A) N/A    Aerobic Smear No WBCs seen N/A    Aerobic Smear No organisms seen N/A       Susceptibility    Klebsiella pneumoniae -  (no method available)     Ampicillin  Resistant      Ampicillin + Sulbactam <=2 Sensitive      Cefazolin <=4 Sensitive      Ciprofloxacin <=0.25 Sensitive      Gentamicin <=1 Sensitive      Meropenem <=0.25 Sensitive      Levofloxacin <=0.12 Sensitive      Piperacillin + Tazobactam <=4 Sensitive      Trimethoprim/Sulfa <=20 Sensitive    3. Blood Culture     Status: None    Collection Time: 10/01/24 12:19 PM    Specimen: Blood,peripheral   Result Value Ref Range    Blood Culture Result No Growth 5 Days N/A         Problem list reviewed:  Patient Active Problem List   Diagnosis    Primary hypertension    Hyperlipidemia with target low density lipoprotein (LDL) cholesterol less than 70 mg/dL    Coronary artery disease involving coronary bypass graft of native heart with angina pectoris (HCC)    S/P CABG x 4    Nontoxic multinodular goiter    Pulmonary nodules    Thrombophlebitis leg    BRANDAN (generalized anxiety disorder)    Right shoulder Arthroscopy acromioplasty ,distal  clavicle resection, rotator cuff/labral debridment  Global 05/13/2021    Right bicipital tenosynovitis     Malignant neoplasm of esophagus (HCC)    Pleural effusion    Esophageal anastomotic leak    Esophageal obstruction    On total parenteral nutrition (TPN)    Mechanical complication of esophagostomy (HCC)    Abdominal pain of unknown etiology    Migration of esophageal stent    Gastroparesis    Esophageal carcinoma (HCC)    Normocytic anemia    Esophageal fistula    Subacute cough    Acquired bronchoesophageal fistula (HCC)    Pneumonia of both lower lobes due to infectious organism    Malignant neoplasm of pancreas (HCC)    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 difficile colitis    Dysphagia, unspecified type    Dyspnea and respiratory abnormalities    Hypokalemia    Dysphagia    Narcotic drug use    History of esophageal cancer    Palliative care by specialist    Neoplasm related pain    Endobronchial mass    Hyponatremia    Thrombocytopenia (HCC)    Acute kidney injury (HCC)    Hyperglycemia    Aspiration pneumonitis (HCC)    C. difficile diarrhea    Aspiration pneumonia (HCC)    Malignant neoplasm metastatic to liver (HCC)    Hyperlipidemia    Nausea vomiting and diarrhea    Fistula, bronchoesophageal (HCC)    Iron deficiency anemia, unspecified iron deficiency anemia type    Azotemia    Palliative care encounter    Goals of care, counseling/discussion    Cancer related pain    Anemia    Fistula    Acute cough    Pneumonia of right lung due to infectious organism, unspecified part of lung    Bacteremia    Acute postoperative pain    Exocrine pancreatic insufficiency (HCC)    Hypertriglyceridemia    Acute pulmonary embolism without acute cor pulmonale, unspecified pulmonary embolism type (HCC)    Acute  deep vein thrombosis (DVT) of popliteal vein of right lower extremity (HCC)    Altered mental status, unspecified altered mental status type    Transaminitis    Biliary obstruction (HCC)    Hyperbilirubinemia    Esophageal adenocarcinoma (HCC)    Cancer associated pain    Abdominal pain, acute    Abnormal CT of the chest    Lower extremity edema    Abnormal finding of diagnostic imaging    Metastatic adenocarcinoma to esophagus (HCC)    Klebsiella pneumoniae sepsis (HCC)    Septic shock (HCC)    Acalculous cholecystitis    Severe malnutrition (HCC)    Metastatic adenocarcinoma (HCC)    Acute cholecystitis    Anxiety about health    Acute respiratory failure with hypoxia (HCC)             ASSESSMENT/PLAN:  1. Klebsiella pneumoniae bacteremia in association with acute cholecystitis    -underlying metastatic esophageal cancer on chemo, port in place  -recent PD stent on 9/17 for malignant obstruction  Admitted 9/30 abdominal pain, RUQ tenderness, CT gallbladder distention, and positive HIDA    -SP IR cholecystostomy 10/4    Klebsiella pneumonia and candida tropicalis from IR drain      Continue cefazolin, flagyl, with addition of fluconazole for yeast      --PO levaquin, fluconazole, flagyl when dc    Conner Bell MD, MD [facility]  [phone]

## 2024-10-08 NOTE — PROGRESS NOTES
[facility]   part of [facility]     Hospitalist Progress Note     Dontae Cortez . Patient Status:  Inpatient    10/15/1970 MRN [id number]   Location [facility] 4NW-A Attending Nixon Vergara MD   Hosp Day # 8 PCP Adrian Horowitz MD     Chief Complaint: abdominal pain    Subjective:   Patient seen with patient's wife at bedside.  Also discussed with oncologist Dr. Foster at bedside.  patient and family meeting with hospice today.  Complains of right upper and abdominal pain 7 out of 10 pain today.  No  vomiting.  Has some nausea.  Afebrile. on 7 L of oxygen by nasal cannula    Objective:    Review of Systems:   A comprehensive review of systems was completed; pertinent positive and negatives stated in subjective.    Vital signs:  Temp:  [97.2 °F (36.2 °C)-98.3 °F (36.8 °C)] 97.2 °F (36.2 °C)  Pulse:  [67-86] 86  Resp:  [19-20] 19  BP: (133-147)/(73-87) 133/73  SpO2:  [89 %-96 %] 96 %    Physical Exam:    /73 (BP Location: Left arm)   Pulse 86   Temp 97.2 °F (36.2 °C) (Oral)   Resp 19   Ht 6' 2\" (1.88 m)   Wt 172 lb 2.9 oz (78.1 kg)   SpO2 96%   BMI 22.11 kg/m²   On 3 L of oxygen by nasal cannula  General: No acute distress  Respiratory: No wheezes, no rhonchi  Cardiovascular: S1, S2, regular rate and rhythm  Abdomen: Soft, right upper quadrant tenderness, right-sided percutaneous cholecystostomy tube present draining bilious drainage, non-distended, positive bowel sounds  Neuro: No new focal deficits.   Extremities: No edema        Diagnostic Data:    Labs:  Recent Labs   Lab 10/02/24  0431 10/03/24  0522 10/03/24  0743 10/04/24  0630 10/04/24  1257 10/05/24  0618 10/06/24  0623 10/07/24  0434   WBC 14.2* 9.3  --  10.2  --  10.4 12.1* 11.9*   HGB 8.7* 8.2*  --  8.3*  --  8.0* 8.6* 8.2*   MCV 92.0 94.1  --  90.7  --  92.5 93.8 93.1   .0 156.0  --  171.0  --  163.0 184.0 197.0   BAND  --   --   --   --   --  1 1  --    INR 2.60*  --  2.02* 2.20* 2.15*  --   --   --         Recent Labs   Lab 10/05/24  0619 10/06/24  0623 10/07/24  0434   GLU 91 110* 104*   BUN 15 15 17   CREATSERUM 0.44* 0.44* 0.46*   CA 8.1* 8.3* 7.8*   ALB 2.5* 2.5* 2.4*    137 137   K 3.7 4.3  4.3 3.8    104 103   CO2 28.0 27.0 31.0   ALKPHO 165* 160* 147*   AST 32 28 27   ALT 16 11 7*   BILT 1.0 0.9 0.8   TP 4.9* 5.1* 4.9*       Estimated Creatinine Clearance: 202.8 mL/min (A) (based on SCr of 0.46 mg/dL (L)).    No results for input(s): \"TROP\", \"TROPHS\", \"CK\" in the last 168 hours.    Recent Labs   Lab 10/03/24  0743 10/04/24  0630 10/04/24  1257   PTP 23.0* 24.6* 24.2*   INR 2.02* 2.20* 2.15*                  Microbiology    Hospital Encounter on 09/30/24   1. Anaerobic Culture     Status: None (Preliminary result)    Collection Time: 10/04/24  2:40 PM    Specimen: Bile; Body fluid, unspecified   Result Value Ref Range    Anaerobic Culture No Anaerobes to date N/A   2. Aerobic Bacterial Culture     Status: Abnormal    Collection Time: 10/04/24  2:40 PM    Specimen: Bile; Body fluid, unspecified   Result Value Ref Range    Aerobic Culture Result 1+ growth Klebsiella pneumoniae (A) N/A    Aerobic Culture Result 4+ growth Candida tropicalis (A) N/A    Aerobic Smear No WBCs seen N/A    Aerobic Smear No organisms seen N/A       Susceptibility    Klebsiella pneumoniae -  (no method available)     Ampicillin  Resistant      Ampicillin + Sulbactam <=2 Sensitive      Cefazolin <=4 Sensitive      Ciprofloxacin <=0.25 Sensitive      Gentamicin <=1 Sensitive      Meropenem <=0.25 Sensitive      Levofloxacin <=0.12 Sensitive      Piperacillin + Tazobactam <=4 Sensitive      Trimethoprim/Sulfa <=20 Sensitive    3. Blood Culture     Status: None    Collection Time: 10/01/24 12:19 PM    Specimen: Blood,peripheral   Result Value Ref Range    Blood Culture Result No Growth 5 Days N/A         Imaging: Reviewed in Epic.   CT chest done on 10/5/2024 shows worsening fluid collection along the surface of the right  lobe of the liver, worsening left greater than right groundglass opacity, small bilateral pleural effusion and atelectasis of the right lung base    Medications:    oxyCODONE  10 mg Oral 4 times per day    metRONIDAZOLE  500 mg Oral 2 times per day    fluconazole  400 mg Oral Daily    ceFAZolin  2 g Intravenous Q8H    baclofen  5 mg Oral BID    vancomycin  125 mg Oral Daily    apixaban  5 mg Oral BID    OLANZapine  5 mg Oral Nightly    pantoprazole  40 mg Oral Before breakfast    pregabalin  25 mg Oral BID    sertraline  200 mg Oral Daily       Assessment & Plan:      # Klebsiella pneumonia sepsis on admission with transient septic shock on 10/1/2024  #Abdominal pain, possible acalculous cholecystitis  -ESR, CRP elevated on admission  -On IV antibiotics-was on IV Zosyn on 9/30/2024, then on IV Rocephin,  which was then changed to IV Ancef per ID based on sensitivity  - transferred to ICU by my colleague Dr. Arshad on 10/1/2024 for hypotension and was on pressor support with Levophed.  ABG with lactate done on 10/1/2024 at the time of transfer to ICU showed lactic acid of 1.1.  Patient received 500 cc of normal saline bolus at the time of transfer to ICU on 10/1/2024, did not receive full sepsis bolus as patient already hypoxic requiring 2 L of oxygen by nasal cannula at that time. Patient did not receive 30ml/kg of fluids despite having: hypotension. The reason for doing so is: a concern for fluid overload. 500mL of fluids were given instead   - blood pressure improved on 10/2/2024 and weaned off of Levophed pressor support on a.m. of 10/2/2024.  Seen by palliative care -patient DNAR/selective treatment  US abdomen Doppler done on 10/1/2024 with normal zeinab in hepatic and portal veins.  Ultrasound abdomen done on 10/2/2024 shows trace perihepatic ascites and fluid demonstrating loculation and septations.  No significant fluid elsewhere within the abdomen.  GI and ID following.    HIDA scan positive -surgeon on  consult ; status post IR cholecystostomy tube done by IR Dr. Mendez Myles MD on 10/4/2024  - CT chest done on 10/5/2024 shows worsening fluid collection along the surface of the right lobe of the liver, worsening left greater than right groundglass opacity, small bilateral pleural effusion and atelectasis of the right lung base.  Pulmonary following      #Metastatic esophageal cancer with previous resection and gastroesophageostomy  # History of prior bronchoesophageal fistula  # pancreatic mets  #Liver and pulmonary mets  # Anemia secondary to chemotherapy and malignancy  - oncology following, has had outpatient chemotherapy     #H/o PE/DVT  Eliquis     #LE swelling, likely 3rd spacing, hypoalbuminemia     #Hypokalemia, replace PRN     #Cancer related pain, per palliative  ?celiac block for pain control was considered by GI during last admission according to patient  GI, oncology and palliative care following in hospital  Pain medications being adjusted by palliative care, discussed with palliative care APN     #HTN, controlled     #DL, statin    #PUD/GERD, PPi     #CAD with prev CABG    #Depression/anxiety, home meds resumed    #TREVOR, due to above, ATN, start gentle hydration/albumin, monitor    #Coagulopathy, due to liver mets, was also on DOAC    #Leukocytosis due to Klebsiella pneumoniae sepsis present on admission  -On IV antibiotics  -Follow CBC in a.m.     #Severe malnutrition-dietitian following     Discussed with patient.  Discussed with RN  D/w patient's wife at bedside.  Also discussed with oncologist Dr. Foster at bedside.  patient with stage IV end-stage cancer, prognosis gaurded.  patient and family meeting with hospice today.    Dr Arshad will follow from tomorrow.    Sakina Hernandez MD  10/8/2024              Supplementary Documentation:     Quality:  DVT Mechanical Prophylaxis:   SCDs, Early ambuation  DVT Pharmacologic Prophylaxis   Medication    heparin (Porcine) 100 Units/mL lock flush 500 Units     apixaban (Eliquis) tab 5 mg                Code Status: DNAR/Selective Treatment  Neumann: No urinary catheter in place  Neumann Duration (in days):   Central line:    SHUBHAM: 10/8/2024    Discharge is dependent on: progress  At this point Mr. Cortez is expected to be discharge to: home    The 21st Century Cures Act makes medical notes like these available to patients in the interest of transparency. Please be advised this is a medical document. Medical documents are intended to carry relevant information, facts as evident, and the clinical opinion of the practitioner. The medical note is intended as peer to peer communication and may appear blunt or direct. It is written in medical language and may contain abbreviations or verbiage that are unfamiliar.     Dietitian Malnutrition Assessment    Evaluation for Malnutrition: Criteria for severe malnutrition diagnosis- chronic illness related to   Wt loss greater than 5% in 1 month., Body fat severe depletion., Muscle mass severe depletion.               RD Malnutrition Care Plan: Adjusted diet to least restrictive to maximize intake., Encouraged increased PO intake., Encouraged small frequent meals with emphasis on high calorie/high protein.    Body Fat/Muscle Mass:          Physician Assessment     Patient has a diagnosis of severe malnutrition

## 2024-10-08 NOTE — PROGRESS NOTES
Pt A&Ox4 on 3L nasal cannula, tolerating medications well per mar. VSS. Complaints of pain, prn pain medication given as needed. IV & oral abx administered. Encouraged patient to shift weight to help with wound. Call light within reach, frequent checks made, needs met.    No

## 2024-10-08 NOTE — CM/SW NOTE
Andreina from [facility] met with family this morning and will then meet with [facility] today at 1:00pm.  Family will then decide which hospice they want to use.

## 2024-10-08 NOTE — PLAN OF CARE
Pt A/O x4. VSS. Afebrile. Pt c/o pain throughout day, scheduled oxycodone and PRN's admin per MAR and palliative recommendation. Pt reported some relief w/ pain regimen. Medications admin per MAR. Pt up to chair today w/ assist. Pt seen by wound care-dressing changed and CDI. Pt repositioned routinely throughout day. Waffle cushion and pillow support provided. Pt encouraged to ambulate as tolerated. Pt's R sofy drain CDI. Fall and safety precautions in place. Call light within reach.

## 2024-10-08 NOTE — PROGRESS NOTES
[facility]  Progress Note    Dontae Cortez Jr. Patient Status:  Inpatient    10/15/1970 MRN [id number]   Location [facility] 4NW-A Attending Sakina Hernandez MD   Hosp Day # 8 PCP Adrian Horowitz MD     Subjective:  Dontae Cortez Jr. is a(n) 53 year old male remains afeb   Patient and family meeting with hospice  Patient states his breathing seems better as his pain is improved though remains on IV Dilaudid  Coughing may be less-remains mostly white    Objective:  /76 (BP Location: Left arm)   Pulse 73   Temp 97.2 °F (36.2 °C) (Oral)   Resp 20   Ht 6' 2\" (1.88 m)   Wt 172 lb 2.9 oz (78.1 kg)   SpO2 94%   BMI 22.11 kg/m² continues on O2 3 L currently      Temp (24hrs), Av.7 °F (36.5 °C), Min:97.2 °F (36.2 °C), Max:98.3 °F (36.8 °C)      Intake/Output:    Intake/Output Summary (Last 24 hours) at 10/8/2024 1315  Last data filed at 10/8/2024 1000  Gross per 24 hour   Intake 280 ml   Output 25 ml   Net 255 ml       Physical Exam:   General: alert, sleepy at time cooperative, oriented.  No respiratory distress.   Head: Normocephalic, without obvious abnormality, atraumatic.   Throat: Lips, mucosa, and tongue normal.  No thrush noted.   Neck: trachea midline, no adenopathy, no thyromegaly. No JVD.   Lungs: Remains with bilateral rales appears slightly clear no auscultated wheeze though poor excursion   Chest wall: No tenderness or deformity.   Heart: Regular rate and rhythm   Abdomen: soft, non-distended, no masses, no guarding, no     Rebound.  Protuberant   Extremity: Ongoing edema bilaterally   Skin: No rashes or lesions.   Neurological: Alert, interactive, no focal deficits    Lab Data Review:  Recent Labs     10/06/24  0623 10/07/24  0434   WBC 12.1* 11.9*   HGB 8.6* 8.2*   .0 197.0     Recent Labs     10/06/24  0623 10/07/24  0434    137   K 4.3  4.3 3.8    103   CO2 27.0 31.0   BUN 15 17   CREATSERUM 0.44* 0.46*     Recent Labs   Lab 10/03/24  0743  10/04/24  0630 10/04/24  1257   PTP 23.0* 24.6* 24.2*   INR 2.02* 2.20* 2.15*       Cultures: No new data    Radiology:  No results found.  Reviewed with some waxing and waning yesterday's film with some improvement in the right lower        Medications reviewed     Assessment and Plan:   Patient Active Problem List   Diagnosis    Primary hypertension    Hyperlipidemia with target low density lipoprotein (LDL) cholesterol less than 70 mg/dL    Coronary artery disease involving coronary bypass graft of native heart with angina pectoris (HCC)    S/P CABG x 4    Nontoxic multinodular goiter    Pulmonary nodules    Thrombophlebitis leg    BRANDAN (generalized anxiety disorder)    Right shoulder Arthroscopy acromioplasty ,distal  clavicle resection, rotator cuff/labral debridment  Global 05/13/2021    Right bicipital tenosynovitis    Malignant neoplasm of esophagus (HCC)    Pleural effusion    Esophageal anastomotic leak    Esophageal obstruction    On total parenteral nutrition (TPN)    Mechanical complication of esophagostomy (HCC)    Abdominal pain of unknown etiology    Migration of esophageal stent    Gastroparesis    Esophageal carcinoma (HCC)    Normocytic anemia    Esophageal fistula    Subacute cough    Acquired bronchoesophageal fistula (HCC)    Pneumonia of both lower lobes due to infectious organism    Malignant neoplasm of pancreas (HCC)    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 difficile colitis    Dysphagia, unspecified type    Dyspnea and respiratory abnormalities    Hypokalemia    Dysphagia    Narcotic drug use    History of esophageal cancer    Palliative care by specialist    Neoplasm related pain     Endobronchial mass    Hyponatremia    Thrombocytopenia (HCC)    Acute kidney injury (HCC)    Hyperglycemia    Aspiration pneumonitis (HCC)    C. difficile diarrhea    Aspiration pneumonia (HCC)    Malignant neoplasm metastatic to liver (HCC)    Hyperlipidemia    Nausea vomiting and diarrhea    Fistula, bronchoesophageal (HCC)    Iron deficiency anemia, unspecified iron deficiency anemia type    Azotemia    Palliative care encounter    Goals of care, counseling/discussion    Cancer related pain    Anemia    Fistula    Acute cough    Pneumonia of right lung due to infectious organism, unspecified part of lung    Bacteremia    Acute postoperative pain    Exocrine pancreatic insufficiency (HCC)    Hypertriglyceridemia    Acute pulmonary embolism without acute cor pulmonale, unspecified pulmonary embolism type (HCC)    Acute deep vein thrombosis (DVT) of popliteal vein of right lower extremity (HCC)    Altered mental status, unspecified altered mental status type    Transaminitis    Biliary obstruction (HCC)    Hyperbilirubinemia    Esophageal adenocarcinoma (HCC)    Cancer associated pain    Abdominal pain, acute    Abnormal CT of the chest    Lower extremity edema    Abnormal finding of diagnostic imaging    Metastatic adenocarcinoma to esophagus (HCC)    Klebsiella pneumoniae sepsis (HCC)    Septic shock (HCC)    Acalculous cholecystitis    Severe malnutrition (HCC)    Metastatic adenocarcinoma (HCC)    Acute cholecystitis    Anxiety about health    Acute respiratory failure with hypoxia (HCC)       Assessment:  Acute hypoxic respiratory failure: Improved but remains on O2-suspected multifactorial  Klebsiella pneumoniae bacteremia/from drain as well- suspected due to acute cholecystitis-status post bile duct stent placement:  Pulmonary infiltrates-some waxing and waning and hypoxia is improving-following on antibiotics -following for need for steroids  Bilateral small pleural effusion/increasing fluid collection along  liver  Nonocclusive pulmonary embolism and left upper lobe and left lower lobe branches of pulmonary artery-on Eliquis  Esophageal cancer status postchemotherapy received paclitaxel 2 weeks ago-progressive  History of fistula postop  History of pulmonary nodules.    Plan:  Continuing to wean FiO2 as possible  As needed diuretics  Consider trial low-dose steroids for post sepsis  Ongoing hospice plans  Reviewed with patient and his wife at bedside    CC     Mago Zavala MD  10/8/2024  1:15 PM

## 2024-10-09 ENCOUNTER — APPOINTMENT (OUTPATIENT)
Dept: GENERAL RADIOLOGY | Facility: HOSPITAL | Age: 54
End: 2024-10-09
Attending: INTERNAL MEDICINE
Payer: COMMERCIAL

## 2024-10-09 PROCEDURE — 71045 X-RAY EXAM CHEST 1 VIEW: CPT | Performed by: INTERNAL MEDICINE

## 2024-10-09 PROCEDURE — 99232 SBSQ HOSP IP/OBS MODERATE 35: CPT | Performed by: INTERNAL MEDICINE

## 2024-10-09 PROCEDURE — 99233 SBSQ HOSP IP/OBS HIGH 50: CPT | Performed by: HOSPITALIST

## 2024-10-09 RX ORDER — NYSTATIN 100000 [USP'U]/ML
5 SUSPENSION ORAL 4 TIMES DAILY
Status: DISCONTINUED | OUTPATIENT
Start: 2024-10-09 | End: 2024-10-12

## 2024-10-09 RX ORDER — METHYLPREDNISOLONE SODIUM SUCCINATE 125 MG/2ML
60 INJECTION, POWDER, LYOPHILIZED, FOR SOLUTION INTRAMUSCULAR; INTRAVENOUS EVERY 12 HOURS
Status: DISCONTINUED | OUTPATIENT
Start: 2024-10-09 | End: 2024-10-10

## 2024-10-09 NOTE — PROGRESS NOTES
[facility]  Progress Note    Dontae Cortez Jr. Patient Status:  Inpatient    10/15/1970 MRN [id number]   Location [facility] 4NW-A Attending Megan Arshad MD   Hosp Day # 9 PCP Adrian Horowitz MD     Subjective:  Dontae Cortez Jr. is a(n) 53 year old male remains afeb   Overall seems brighter today feels better slept better  Remains with cough though denies any specific sputum right flank and lower chest patient has improved  Requiring however upwards of 5 L of O2 currently 5 L    Objective:  /78 (BP Location: Left arm)   Pulse 84   Temp 98 °F (36.7 °C)   Resp 20   Ht 6' 2\" (1.88 m)   Wt 172 lb 2.9 oz (78.1 kg)   SpO2 (!) 89%   BMI 22.11 kg/m²       Temp (24hrs), Av °F (36.7 °C), Min:97.9 °F (36.6 °C), Max:98.1 °F (36.7 °C)      Intake/Output:    Intake/Output Summary (Last 24 hours) at 10/9/2024 1036  Last data filed at 10/9/2024 0406  Gross per 24 hour   Intake 280 ml   Output 645 ml   Net -365 ml       Physical Exam:   General: alert, cooperative, oriented.  No respiratory distress.  Thin   Head: Normocephalic, without obvious abnormality, atraumatic.   Throat: Lips, mucosa, and tongue normal.  No thrush noted.   Neck: trachea midline, no adenopathy, no thyromegaly. No JVD.   Lungs: Bilateral rales left greater than right no auscultated wheeze   Chest wall: No tenderness or deformity.   Heart: Regular rate and rhythm   Abdomen: soft, non-distended, no masses, no guarding, no     Rebound.  Protuberant currently nontender   Extremity: Ongoing edema bilaterally   Skin: No rashes or lesions.   Neurological: Alert, interactive, no focal deficits    Lab Data Review:  Recent Labs     10/07/24  0434   WBC 11.9*   HGB 8.2*   .0     Recent Labs     10/07/24  0434      K 3.8      CO2 31.0   BUN 17   CREATSERUM 0.46*     Recent Labs   Lab 10/03/24  0743 10/04/24  0630 10/04/24  1257   PTP 23.0* 24.6* 24.2*   INR 2.02* 2.20* 2.15*       Cultures: Klebsiella from   tube without change    Radiology:  XR CHEST AP PORTABLE  (CPT=71045)    Result Date: 10/9/2024  CONCLUSION:    Extensive airspace disease throughout the upper mid and lower left lung slightly improved from the prior.  There may be small left effusion.  Mild areas of developing mixed interstitial and airspace opacity mid-lower lung on the right.  Small blunting right costophrenic angle.  No pneumothorax seen.  Stable cardiac enlargement.  Stable central line.  LOCATION:  [facility]      Dictated by (CST): Ernesto Cabrera MD on 10/09/2024 at 8:30 AM     Finalized by (CST): Ernesto Cabrera MD on 10/09/2024 at 8:31 AM      Chest x-ray with increasing left sided infiltrates-diffusely      Medications reviewed     Assessment and Plan:   Patient Active Problem List   Diagnosis    Primary hypertension    Hyperlipidemia with target low density lipoprotein (LDL) cholesterol less than 70 mg/dL    Coronary artery disease involving coronary bypass graft of native heart with angina pectoris (HCC)    S/P CABG x 4    Nontoxic multinodular goiter    Pulmonary nodules    Thrombophlebitis leg    BRANDAN (generalized anxiety disorder)    Right shoulder Arthroscopy acromioplasty ,distal  clavicle resection, rotator cuff/labral debridment  Global 05/13/2021    Right bicipital tenosynovitis    Malignant neoplasm of esophagus (HCC)    Pleural effusion    Esophageal anastomotic leak    Esophageal obstruction    On total parenteral nutrition (TPN)    Mechanical complication of esophagostomy (HCC)    Abdominal pain of unknown etiology    Migration of esophageal stent    Gastroparesis    Esophageal carcinoma (HCC)    Normocytic anemia    Esophageal fistula    Subacute cough    Acquired bronchoesophageal fistula (HCC)    Pneumonia of both lower lobes due to infectious organism    Malignant neoplasm of pancreas (HCC)    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 difficile colitis    Dysphagia, unspecified type    Dyspnea and respiratory abnormalities    Hypokalemia    Dysphagia    Narcotic drug use    History of esophageal cancer    Palliative care by specialist    Neoplasm related pain    Endobronchial mass    Hyponatremia    Thrombocytopenia (HCC)    Acute kidney injury (HCC)    Hyperglycemia    Aspiration pneumonitis (HCC)    C. difficile diarrhea    Aspiration pneumonia (HCC)    Malignant neoplasm metastatic to liver (HCC)    Hyperlipidemia    Nausea vomiting and diarrhea    Fistula, bronchoesophageal (HCC)    Iron deficiency anemia, unspecified iron deficiency anemia type    Azotemia    Palliative care encounter    Goals of care, counseling/discussion    Cancer related pain    Anemia    Fistula    Acute cough    Pneumonia of right lung due to infectious organism, unspecified part of lung    Bacteremia    Acute postoperative pain    Exocrine pancreatic insufficiency (HCC)    Hypertriglyceridemia    Acute pulmonary embolism without acute cor pulmonale, unspecified pulmonary embolism type (HCC)    Acute deep vein thrombosis (DVT) of popliteal vein of right lower extremity (HCC)    Altered mental status, unspecified altered mental status type    Transaminitis    Biliary obstruction (HCC)    Hyperbilirubinemia    Esophageal adenocarcinoma (HCC)    Cancer associated pain    Abdominal pain, acute    Abnormal CT of the chest    Lower extremity edema    Abnormal finding of diagnostic imaging    Metastatic adenocarcinoma to esophagus (HCC)    Klebsiella pneumoniae sepsis (HCC)    Septic shock (HCC)    Acalculous cholecystitis    Severe malnutrition (HCC)    Metastatic adenocarcinoma (HCC)    Acute cholecystitis    Anxiety about health    Acute respiratory failure  with hypoxia (HCC)       Assessment:  Acute hypoxic respiratory failure: Improved but remains on O2-suspected multifactorial now with increasing left-sided infiltrates  Klebsiella pneumoniae bacteremia/from drain as well- suspected due to acute cholecystitis-status post bile duct stent placement:  Pulmonary infiltrates-now increasing on the left-following on antibiotics -discuss can hardly thank you need for steroids  Bilateral small pleural effusion/increasing fluid collection along liver  Nonocclusive pulmonary embolism and left upper lobe and left lower lobe branches of pulmonary artery-on Eliquis  Esophageal cancer status postchemotherapy received paclitaxel 2 weeks ago-progressive  History of fistula postop  History of pulmonary nodules.    Plan:  Will review with hematology oncology  Continuing bronchopulmonary toilet continuing antibiotic coverage  Continuing to titrate FiO2 baseline is room air    CC     Mago Zavala MD  10/9/2024  10:36 AM

## 2024-10-09 NOTE — PROGRESS NOTES
INFECTIOUS DISEASE  PROGRESS NOTE            [facility]    Dontae Buddy Diego Champion Patient Status:  Inpatient    10/15/1970 MRN [id number]   [facility] 4SW-A Attending Sakina Hernandez MD   Hosp Day # 9 PCP Adrian Horowitz MD     Antibiotics: #9  Cefazolin #8  Metronidazole #6  Fluconazole #3    Subjective:  comfortable    Objective:  Temp:  [97.9 °F (36.6 °C)-98.1 °F (36.7 °C)] 98 °F (36.7 °C)  Pulse:  [76-86] 81  Resp:  [18-20] 20  BP: (133-142)/(73-92) 138/78  SpO2:  [96 %] 96 %    General: awake alert  Vital signs:Temp:  [97.9 °F (36.6 °C)-98.1 °F (36.7 °C)] 98 °F (36.7 °C)  Pulse:  [76-86] 81  Resp:  [18-20] 20  BP: (133-142)/(73-92) 138/78  SpO2:  [96 %] 96 %  HEENT: Moist mucous membranes. Extraocular muscles are intact.  Neck: supple no masses  Respiratory: Non labored, symmetric excursion, normal respirations  Port intact  Cardiovascular: no irregularities in rhythm  Abdomen, non distended, drain in place  Musculoskeletal: joints: no swelling   Integument: No lesions. No erythema. No open wounds.  Labs:     COVID-19 Lab Results    COVID-19  Lab Results   Component Value Date    COVID19 Not Detected 2024    COVID19 Not Detected 2024    COVID19 Not Detected 2024       Pro-Calcitonin  Recent Labs   Lab 10/06/24  0623   PCT 0.33*       Cardiac  No results for input(s): \"TROP\", \"PBNP\" in the last 168 hours.    Creatinine Kinase  No results for input(s): \"CK\" in the last 168 hours.    Inflammatory Markers  Recent Labs   Lab 10/02/24  1035 10/07/24  0434   CRP  --  7.90*   MYAH 791*  --        Recent Labs   Lab 10/07/24  0434   RBC 2.76*   HGB 8.2*   HCT 25.7*   MCV 93.1   MCH 29.7   MCHC 31.9   RDW 14.8   NEPRELIM 10.19*   WBC 11.9*   .0   NEUT 92   LYMPH 1   MON 5   EOS 1         Recent Labs   Lab 10/05/24  0619 10/06/24  0623 10/07/24  0434   GLU 91 110* 104*   BUN 15 15 17   CREATSERUM 0.44* 0.44* 0.46*   CA 8.1* 8.3* 7.8*   ALB 2.5* 2.5* 2.4*    137 137   K  3.7 4.3  4.3 3.8    104 103   CO2 28.0 27.0 31.0   ALKPHO 165* 160* 147*   AST 32 28 27   ALT 16 11 7*   BILT 1.0 0.9 0.8   TP 4.9* 5.1* 4.9*       No results found for: \"VANCT\"  Microbiology    Hospital Encounter on 09/30/24   1. Anaerobic Culture     Status: None    Collection Time: 10/04/24  2:40 PM    Specimen: Bile; Body fluid, unspecified   Result Value Ref Range    Anaerobic Culture No Anaerobes isolated N/A   2. Aerobic Bacterial Culture     Status: Abnormal    Collection Time: 10/04/24  2:40 PM    Specimen: Bile; Body fluid, unspecified   Result Value Ref Range    Aerobic Culture Result 1+ growth Klebsiella pneumoniae (A) N/A    Aerobic Culture Result 4+ growth Candida tropicalis (A) N/A    Aerobic Smear No WBCs seen N/A    Aerobic Smear No organisms seen N/A       Susceptibility    Klebsiella pneumoniae -  (no method available)     Ampicillin  Resistant      Ampicillin + Sulbactam <=2 Sensitive      Cefazolin <=4 Sensitive      Ciprofloxacin <=0.25 Sensitive      Gentamicin <=1 Sensitive      Meropenem <=0.25 Sensitive      Levofloxacin <=0.12 Sensitive      Piperacillin + Tazobactam <=4 Sensitive      Trimethoprim/Sulfa <=20 Sensitive    3. Blood Culture     Status: None    Collection Time: 10/01/24 12:19 PM    Specimen: Blood,peripheral   Result Value Ref Range    Blood Culture Result No Growth 5 Days N/A         Problem list reviewed:  Patient Active Problem List   Diagnosis    Primary hypertension    Hyperlipidemia with target low density lipoprotein (LDL) cholesterol less than 70 mg/dL    Coronary artery disease involving coronary bypass graft of native heart with angina pectoris (HCC)    S/P CABG x 4    Nontoxic multinodular goiter    Pulmonary nodules    Thrombophlebitis leg    BRANDAN (generalized anxiety disorder)    Right shoulder Arthroscopy acromioplasty ,distal  clavicle resection, rotator cuff/labral debridment  Global 05/13/2021    Right bicipital tenosynovitis    Malignant neoplasm of  esophagus (HCC)    Pleural effusion    Esophageal anastomotic leak    Esophageal obstruction    On total parenteral nutrition (TPN)    Mechanical complication of esophagostomy (HCC)    Abdominal pain of unknown etiology    Migration of esophageal stent    Gastroparesis    Esophageal carcinoma (HCC)    Normocytic anemia    Esophageal fistula    Subacute cough    Acquired bronchoesophageal fistula (HCC)    Pneumonia of both lower lobes due to infectious organism    Malignant neoplasm of pancreas (HCC)    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 difficile colitis    Dysphagia, unspecified type    Dyspnea and respiratory abnormalities    Hypokalemia    Dysphagia    Narcotic drug use    History of esophageal cancer    Palliative care by specialist    Neoplasm related pain    Endobronchial mass    Hyponatremia    Thrombocytopenia (HCC)    Acute kidney injury (HCC)    Hyperglycemia    Aspiration pneumonitis (HCC)    C. difficile diarrhea    Aspiration pneumonia (HCC)    Malignant neoplasm metastatic to liver (HCC)    Hyperlipidemia    Nausea vomiting and diarrhea    Fistula, bronchoesophageal (HCC)    Iron deficiency anemia, unspecified iron deficiency anemia type    Azotemia    Palliative care encounter    Goals of care, counseling/discussion    Cancer related pain    Anemia    Fistula    Acute cough    Pneumonia of right lung due to infectious organism, unspecified part of lung    Bacteremia    Acute postoperative pain    Exocrine pancreatic insufficiency (HCC)    Hypertriglyceridemia    Acute pulmonary embolism without acute cor pulmonale, unspecified pulmonary embolism type (HCC)    Acute deep vein thrombosis (DVT)  of popliteal vein of right lower extremity (HCC)    Altered mental status, unspecified altered mental status type    Transaminitis    Biliary obstruction (HCC)    Hyperbilirubinemia    Esophageal adenocarcinoma (HCC)    Cancer associated pain    Abdominal pain, acute    Abnormal CT of the chest    Lower extremity edema    Abnormal finding of diagnostic imaging    Metastatic adenocarcinoma to esophagus (HCC)    Klebsiella pneumoniae sepsis (HCC)    Septic shock (HCC)    Acalculous cholecystitis    Severe malnutrition (HCC)    Metastatic adenocarcinoma (HCC)    Acute cholecystitis    Anxiety about health    Acute respiratory failure with hypoxia (HCC)             ASSESSMENT/PLAN:  1. Klebsiella pneumoniae bacteremia in association with acute cholecystitis    -underlying metastatic esophageal cancer on chemo, port in place  -recent PD stent on 9/17 for malignant obstruction  Admitted 9/30 abdominal pain, RUQ tenderness, CT gallbladder distention, and positive HIDA    -SP IR cholecystostomy 10/4    Klebsiella pneumonia and candida tropicalis from IR drain      Continue cefazolin, flagyl, with addition of fluconazole for yeast      --PO levaquin, fluconazole, flagyl when dc    Conner Bell MD, MD  [facility]  [phone]

## 2024-10-09 NOTE — CM/SW NOTE
SW called and spoke with patient's wife Alysha regarding hospice conversations had yesterday. She reported feeling very overwhelmed and that it was a lot of information to receive in one day. Grief support provided and information discussed regarding home w/ hospice vs. Home without hospice.    SW discussed that if patient were to DC home without hospice then medical equipment could be ordered and delivered to home. Patient's wife appreciated the offering and discussion of home without hospice stating that she is worried the medical team is giving up on patient now that hospice is being discussed. SW assured her that the medical team is open to whatever DC plan they are wanting but that there would need to be things ordered if they do not pursue hospice. Wife expressed appreciation and understanding.     Alysha did report that she wants PT/OT evals to ensure that patient is able to get to the restroom independently. She stated that regardless if they go with hospice or not she wants patient to have strength as long as possible. MARIZA notified RN and requested that evals be placed.     MARIZA discussed with Alysha inpatient hospice at [facility] vs. [facility] and she stated she would like to tour the inpatient hospice unit for [facility] if possible. SW sent message to [facility] and requested that they call her.     SW will continue to follow for plan of care changes and remain available for any additional DC needs or concerns.     Akanksha Helms MSW, LSW  Discharge Planner   [id number]

## 2024-10-09 NOTE — PAYOR COMM NOTE
--------------  CONTINUED STAY REVIEW    Payor: [employer] [facility] PPO  Subscriber #:  [id number]  Authorization Number: [id number]    Admit date: 9/30/24  Admit time: 10:48 AM    REVIEW DOCUMENTATION:   10-8-24     Patient and family meeting with hospice  Patient states his breathing seems better as his pain is improved though remains on IV Dilaudid  Coughing may be less-remains mostly white     Physical Exam:               General: alert, sleepy at time cooperative, oriented.  No respiratory distress.               Head: Normocephalic, without obvious abnormality, atraumatic.               Throat: Lips, mucosa, and tongue normal.  No thrush noted.               Neck: trachea midline, no adenopathy, no thyromegaly. No JVD.               Lungs: Remains with bilateral rales appears slightly clear no auscultated wheeze though poor excursion               Chest wall: No tenderness or deformity.               Heart: Regular rate and rhythm               Abdomen: soft, non-distended, no masses, no guarding, no                       Rebound.  Protuberant               Extremity: Ongoing edema bilaterally               Skin: No rashes or lesions.               Neurological: Alert, interactive, no focal deficits       Assessment:  Acute hypoxic respiratory failure: Improved but remains on O2-suspected multifactorial  Klebsiella pneumoniae bacteremia/from drain as well- suspected due to acute cholecystitis-status post bile duct stent placement:  Pulmonary infiltrates-some waxing and waning and hypoxia is improving-following on antibiotics -following for need for steroids  Bilateral small pleural effusion/increasing fluid collection along liver  Nonocclusive pulmonary embolism and left upper lobe and left lower lobe branches of pulmonary artery-on Eliquis  Esophageal cancer status postchemotherapy received paclitaxel 2 weeks ago-progressive  History of fistula postop  History of pulmonary nodules.     Plan:  Continuing  to wean FiO2 as possible  As needed diuretics  Consider trial low-dose steroids for post sepsis  Ongoing hospice plans    MEDICATIONS ADMINISTERED IN LAST 1 DAY:  apixaban (Eliquis) tab 5 mg       Date Action Dose Route User    10/9/2024 0855 Given 5 mg Oral Dolly Sharma RN    10/8/2024 2033 Given 5 mg Oral Summer Ram RN          baclofen (Lioresal) tab 5 mg       Date Action Dose Route User    10/9/2024 0855 Given 5 mg Oral Dolly Sharma RN    10/8/2024 2033 Given 5 mg Oral Summer Ram RN          ceFAZolin (Ancef) 2g in 10mL IV syringe premix       Date Action Dose Route User    10/9/2024 0406 New Bag 2 g Intravenous Summer Ram RN    10/8/2024 2033 New Bag 2 g Intravenous Summer Ram RN    10/8/2024 1155 New Bag 2 g Intravenous Josh Ghosh RN          fluconazole (Diflucan) tab 400 mg       Date Action Dose Route User    10/8/2024 1525 Given 400 mg Oral Josh Ghosh RN          HYDROmorphone (Dilaudid) 1 MG/ML injection 0.4 mg       Date Action Dose Route User    10/9/2024 0855 Given 0.4 mg Intravenous Dolly Sharma RN    10/9/2024 0405 Given 0.4 mg Intravenous Summer Ram RN    10/8/2024 2033 Given 0.4 mg Intravenous Summer Ram RN    10/8/2024 1527 Given 0.4 mg Intravenous Josh Ghosh RN    10/8/2024 1155 Given 0.4 mg Intravenous Josh Ghosh RN          LORazepam (Ativan) tab 0.5 mg       Date Action Dose Route User    10/8/2024 2121 Given 0.5 mg Oral Summer Ram RN          metRONIDAZOLE (Flagyl) tab 500 mg       Date Action Dose Route User    10/9/2024 0855 Given 500 mg Oral Dolly Sharma RN    10/8/2024 2033 Given 500 mg Oral Summer Ram, RN          OLANZapine (ZyPREXA) tab 5 mg       Date Action Dose Route User    10/8/2024 2032 Given 5 mg Oral Summer Ram, RN          oxyCODONE immediate release tab 10 mg       Date Action Dose Route User    10/9/2024 0637 Given 10 mg Oral  Summer Ram RN    10/8/2024 2358 Given 10 mg Oral Summer Ram RN    10/8/2024 1752 Given 10 mg Oral Josh Ghosh RN    10/8/2024 1155 Given 10 mg Oral Josh Ghosh RN          oxyCODONE immediate release tab 10 mg       Date Action Dose Route User    10/8/2024 2121 Given 10 mg Oral Summer Ram RN    10/8/2024 1525 Given 10 mg Oral Josh Ghosh RN          pantoprazole (Protonix) DR tab 40 mg       Date Action Dose Route User    10/9/2024 0637 Given 40 mg Oral Summer Ram RN          pregabalin (Lyrica) cap 25 mg       Date Action Dose Route User    10/9/2024 0855 Given 25 mg Oral Dolly Sharma RN    10/8/2024 2033 Given 25 mg Oral Summer Ram RN          sertraline (Zoloft) tab 200 mg       Date Action Dose Route User    10/9/2024 0855 Given 200 mg Oral Dolly Sharma RN          vancomycin (Vancocin) cap 125 mg       Date Action Dose Route User    10/9/2024 0855 Given 125 mg Oral Dolly Sharma RN            Vitals (last day)       Date/Time Temp Pulse Resp BP SpO2 Weight O2 Device O2 Flow Rate (L/min) [facility]    10/09/24 1045 -- 81 -- -- 98 % -- -- 5 L/min     10/09/24 0905 -- 84 -- -- 89 % -- -- 4 L/min     10/09/24 0904 -- 88 -- -- 85 % -- -- 4 L/min     10/09/24 0900 -- -- -- -- -- -- -- 4 L/min     10/09/24 0411 98 °F (36.7 °C) -- 20 -- -- -- -- 5 L/min SO    10/08/24 2019 98.1 °F (36.7 °C) 81 20 138/78 96 % -- -- 5 L/min SF    10/08/24 1704 97.9 °F (36.6 °C) 76 18 142/92 96 % -- High flow nasal cannula 7 L/min AS    10/08/24 1330 -- 86 19 133/73 96 % -- High flow nasal cannula 7 L/min AS    10/08/24 0917 -- -- -- -- -- -- High flow nasal cannula 7 L/min AM    10/08/24 0407 97.2 °F (36.2 °C) 73 20 141/76 94 % -- High flow nasal cannula 7 L/min EK

## 2024-10-09 NOTE — PROGRESS NOTES
Maintained on 5 liters. Continues  to have breakthrough pain.prn meds as charted. Port intact. Bilary drain intact. Slept intermittently    Complains of dry mouth, tongue coated this am

## 2024-10-09 NOTE — PROGRESS NOTES
[facility]   part of [facility]     Hospitalist Progress Note     Dontae Buddy Cortez . Patient Status:  Inpatient    10/15/1970 MRN [id number]   Location [facility] 4NW-A Attending Nixon Vergara MD   Hosp Day # 9 PCP Adrian Horowitz MD     Chief Complaint: abdominal pain    Subjective:   Patient seen with patient's wife at bedside.  Still with abdominal pain, met with hospice yesterday.    Objective:    Review of Systems:   A comprehensive review of systems was completed; pertinent positive and negatives stated in subjective.    Vital signs:  Temp:  [97.9 °F (36.6 °C)-98.1 °F (36.7 °C)] 98.1 °F (36.7 °C)  Pulse:  [76-88] 78  Resp:  [18-20] 18  BP: (133-142)/(73-92) 137/81  SpO2:  [85 %-98 %] 96 %    Physical Exam:    /81 (BP Location: Left arm)   Pulse 78   Temp 98.1 °F (36.7 °C) (Oral)   Resp 18   Ht 6' 2\" (1.88 m)   Wt 172 lb 2.9 oz (78.1 kg)   SpO2 96%   BMI 22.11 kg/m²   On 3 L of oxygen by nasal cannula  General: No acute distress  Respiratory: No wheezes, no rhonchi  Cardiovascular: S1, S2, regular rate and rhythm  Abdomen: Soft, right upper quadrant tenderness, right-sided percutaneous cholecystostomy tube present draining bilious drainage, non-distended, positive bowel sounds  Neuro: No new focal deficits.   Extremities: No edema        Diagnostic Data:    Labs:  Recent Labs   Lab 10/03/24  0522 10/03/24  0743 10/04/24  0630 10/04/24  1257 10/05/24  0618 10/06/24  0623 10/07/24  0434   WBC 9.3  --  10.2  --  10.4 12.1* 11.9*   HGB 8.2*  --  8.3*  --  8.0* 8.6* 8.2*   MCV 94.1  --  90.7  --  92.5 93.8 93.1   .0  --  171.0  --  163.0 184.0 197.0   BAND  --   --   --   --  1 1  --    INR  --  2.02* 2.20* 2.15*  --   --   --        Recent Labs   Lab 10/05/24  0619 10/06/24  0623 10/07/24  0434   GLU 91 110* 104*   BUN 15 15 17   CREATSERUM 0.44* 0.44* 0.46*   CA 8.1* 8.3* 7.8*   ALB 2.5* 2.5* 2.4*    137 137   K 3.7 4.3  4.3 3.8    104 103   CO2 28.0 27.0 31.0    ALKPHO 165* 160* 147*   AST 32 28 27   ALT 16 11 7*   BILT 1.0 0.9 0.8   TP 4.9* 5.1* 4.9*       Estimated Creatinine Clearance: 202.8 mL/min (A) (based on SCr of 0.46 mg/dL (L)).    No results for input(s): \"TROP\", \"TROPHS\", \"CK\" in the last 168 hours.    Recent Labs   Lab 10/03/24  0743 10/04/24  0630 10/04/24  1257   PTP 23.0* 24.6* 24.2*   INR 2.02* 2.20* 2.15*                  Microbiology    Hospital Encounter on 09/30/24   1. Anaerobic Culture     Status: None    Collection Time: 10/04/24  2:40 PM    Specimen: Bile; Body fluid, unspecified   Result Value Ref Range    Anaerobic Culture No Anaerobes isolated N/A   2. Aerobic Bacterial Culture     Status: Abnormal    Collection Time: 10/04/24  2:40 PM    Specimen: Bile; Body fluid, unspecified   Result Value Ref Range    Aerobic Culture Result 1+ growth Klebsiella pneumoniae (A) N/A    Aerobic Culture Result 4+ growth Candida tropicalis (A) N/A    Aerobic Smear No WBCs seen N/A    Aerobic Smear No organisms seen N/A       Susceptibility    Klebsiella pneumoniae -  (no method available)     Ampicillin  Resistant      Ampicillin + Sulbactam <=2 Sensitive      Cefazolin <=4 Sensitive      Ciprofloxacin <=0.25 Sensitive      Gentamicin <=1 Sensitive      Meropenem <=0.25 Sensitive      Levofloxacin <=0.12 Sensitive      Piperacillin + Tazobactam <=4 Sensitive      Trimethoprim/Sulfa <=20 Sensitive    3. Blood Culture     Status: None    Collection Time: 10/01/24 12:19 PM    Specimen: Blood,peripheral   Result Value Ref Range    Blood Culture Result No Growth 5 Days N/A         Imaging: Reviewed in Epic.   CT chest done on 10/5/2024 shows worsening fluid collection along the surface of the right lobe of the liver, worsening left greater than right groundglass opacity, small bilateral pleural effusion and atelectasis of the right lung base    Medications:    oxyCODONE  10 mg Oral 4 times per day    metRONIDAZOLE  500 mg Oral 2 times per day    fluconazole  400 mg  Oral Daily    ceFAZolin  2 g Intravenous Q8H    baclofen  5 mg Oral BID    vancomycin  125 mg Oral Daily    apixaban  5 mg Oral BID    OLANZapine  5 mg Oral Nightly    pantoprazole  40 mg Oral Before breakfast    pregabalin  25 mg Oral BID    sertraline  200 mg Oral Daily       Assessment & Plan:      # Klebsiella pneumonia/bacteremia/septic shock  #Abdominal pain, possible acalculous cholecystitis  S/p cholecystostomy 10/4  - CT chest done on 10/5/2024 shows worsening fluid collection along the surface of the right lobe of the liver, worsening left greater than right groundglass opacity, small bilateral pleural effusion and atelectasis of the right lung base.  Pulmonary following      #Metastatic esophageal cancer with previous resection and gastroesophageostomy  # History of prior bronchoesophageal fistula  # pancreatic mets  #Liver and pulmonary mets  # Anemia secondary to chemotherapy and malignancy  - oncology following, has had outpatient chemotherapy  -now hospice recommended, family to decide, no decision as of today     #H/o PE/DVT  Eliquis     #LE swelling, likely 3rd spacing, hypoalbuminemia     #Hypokalemia, replace PRN     #Cancer related pain, per palliative  ?celiac block for pain control was considered by GI during last admission according to patient  GI, oncology and palliative care following in hospital  Pain medications being adjusted by palliative care, discussed with palliative care APN     #HTN, controlled     #DL, statin    #PUD/GERD, PPi     #CAD with prev CABG    #Depression/anxiety, home meds resumed    #TREVOR, due to above, ATN, start gentle hydration/albumin, monitor  Resolved     #Coagulopathy, due to liver mets, was also on DOAC, stable    #Leukocytosis due to Klebsiella pneumoniae/bacteremia   -On IV antibiotics  IV abx per ID  -wbc improved     #Severe malnutrition-dietitian following     Megan Arshad MD                Supplementary Documentation:     Quality:  DVT Mechanical  Prophylaxis:   SCDs, Early ambuation  DVT Pharmacologic Prophylaxis   Medication    heparin (Porcine) 100 Units/mL lock flush 500 Units    apixaban (Eliquis) tab 5 mg                Code Status: DNAR/Selective Treatment  Neumann: No urinary catheter in place  Neumann Duration (in days):   Central line:    SHUBHAM:     Discharge is dependent on: progress  At this point Mr. Cortez is expected to be discharge to: home    The 21st Century Cures Act makes medical notes like these available to patients in the interest of transparency. Please be advised this is a medical document. Medical documents are intended to carry relevant information, facts as evident, and the clinical opinion of the practitioner. The medical note is intended as peer to peer communication and may appear blunt or direct. It is written in medical language and may contain abbreviations or verbiage that are unfamiliar.     Dietitian Malnutrition Assessment    Evaluation for Malnutrition: Criteria for severe malnutrition diagnosis- chronic illness related to   Wt loss greater than 5% in 1 month., Body fat severe depletion., Muscle mass severe depletion.               RD Malnutrition Care Plan: Adjusted diet to least restrictive to maximize intake., Encouraged increased PO intake., Encouraged small frequent meals with emphasis on high calorie/high protein.    Body Fat/Muscle Mass:          Physician Assessment     Patient has a diagnosis of severe malnutrition

## 2024-10-09 NOTE — PROGRESS NOTES
Palliative Care Brief Note    Dontae Petersonmarissa Diego Arechiga. Patient Status:  Inpatient    10/15/1970 MRN [id number]   Location [facility] 4NW-A Attending Megan Arshad MD   Hosp Day # 9 PCP Adrian Horowitz MD     Date of Consult: 10/9/2024  [facility] Inpatient    I briefly met with Alysha & Ronen to f/u on symptoms. Ronen feels his pain is controlled on the current treatment plan. Ronen also feels he is \"calmer\" with the addition of Lorazepam & he was able to sleep last night.     Plan:   CPM  Await hospice decision.   Will follow up on 10/10/24.    Discussed with Spouse.    Lexy Stewart, ARTEMIO  10/9/2024 5:42 PM  Palliative Care Services  No charges this encounter  The  Century Cures Act makes medical notes like these available to patients in the interest of transparency. Please be advised this is a medical document. Medical documents are intended to carry relevant information, facts as evident, and the clinical opinion of the practitioner. The medical note is intended as peer to peer communication and may appear blunt or direct. It is written in medical language and may contain abbreviations or verbiage that are unfamiliar.

## 2024-10-09 NOTE — PLAN OF CARE
Pt Aox4. VSS. 5L NC O2. Did not tolerated titration.   Resting in bed.   Abdominal and R shoulder pain managed by scheduled Oxycodone 10mg and Dilaudid 0.4 mg PRN.   Poor appetite.   Family at the bedside.     Noted L arm swelling. US doppler ordered.  C-xray done in the am. NNO.   C-xray ordered for tomorrow am.       Problem: RESPIRATORY - ADULT  Goal: Achieves optimal ventilation and oxygenation  Description: INTERVENTIONS:  - Assess for changes in respiratory status  - Assess for changes in mentation and behavior  - Position to facilitate oxygenation and minimize respiratory effort  - Oxygen supplementation based on oxygen saturation or ABGs  - Provide Smoking Cessation handout, if applicable  - Encourage broncho-pulmonary hygiene including cough, deep breathe, Incentive Spirometry  - Assess the need for suctioning and perform as needed  - Assess and instruct to report SOB or any respiratory difficulty  - Respiratory Therapy support as indicated  - Manage/alleviate anxiety  - Monitor for signs/symptoms of CO2 retention  Outcome: Progressing     Problem: GASTROINTESTINAL - ADULT  Goal: Maintains or returns to baseline bowel function  Description: INTERVENTIONS:  - Assess bowel function  - Maintain adequate hydration with IV or PO as ordered and tolerated  - Evaluate effectiveness of GI medications  - Encourage mobilization and activity  - Obtain nutritional consult as needed  - Establish a toileting routine/schedule  - Consider collaborating with pharmacy to review patient's medication profile  Outcome: Progressing     Problem: SKIN/TISSUE INTEGRITY - ADULT  Goal: Skin integrity remains intact  Description: INTERVENTIONS  - Assess and document risk factors for pressure ulcer development  - Assess and document skin integrity  - Monitor for areas of redness and/or skin breakdown  - Initiate interventions, skin care algorithm/standards of care as needed  Outcome: Progressing     Problem: MUSCULOSKELETAL -  ADULT  Goal: Return mobility to safest level of function  Description: INTERVENTIONS:  - Assess patient stability and activity tolerance for standing, transferring and ambulating w/ or w/o assistive devices  - Assist with transfers and ambulation using safe patient handling equipment as needed  - Ensure adequate protection for wounds/incisions during mobilization  - Obtain PT/OT consults as needed  - Advance activity as appropriate  - Communicate ordered activity level and limitations with patient/family  Outcome: Progressing     Problem: PAIN - ADULT  Goal: Verbalizes/displays adequate comfort level or patient's stated pain goal  Description: INTERVENTIONS:  - Encourage pt to monitor pain and request assistance  - Assess pain using appropriate pain scale  - Administer analgesics based on type and severity of pain and evaluate response  - Implement non-pharmacological measures as appropriate and evaluate response  - Consider cultural and social influences on pain and pain management  - Manage/alleviate anxiety  - Utilize distraction and/or relaxation techniques  - Monitor for opioid side effects  - Notify MD/LIP if interventions unsuccessful or patient reports new pain  - Anticipate increased pain with activity and pre-medicate as appropriate  Outcome: Progressing

## 2024-10-10 ENCOUNTER — APPOINTMENT (OUTPATIENT)
Dept: GENERAL RADIOLOGY | Facility: HOSPITAL | Age: 54
End: 2024-10-10
Attending: INTERNAL MEDICINE
Payer: COMMERCIAL

## 2024-10-10 ENCOUNTER — APPOINTMENT (OUTPATIENT)
Dept: ULTRASOUND IMAGING | Facility: HOSPITAL | Age: 54
End: 2024-10-10
Attending: HOSPITALIST
Payer: COMMERCIAL

## 2024-10-10 PROBLEM — R91.8 PULMONARY INFILTRATES: Status: ACTIVE | Noted: 2024-01-01

## 2024-10-10 PROBLEM — R91.8 PULMONARY INFILTRATES: Status: ACTIVE | Noted: 2024-02-20

## 2024-10-10 PROCEDURE — 93971 EXTREMITY STUDY: CPT | Performed by: HOSPITALIST

## 2024-10-10 PROCEDURE — 99232 SBSQ HOSP IP/OBS MODERATE 35: CPT | Performed by: NURSE PRACTITIONER

## 2024-10-10 PROCEDURE — 99232 SBSQ HOSP IP/OBS MODERATE 35: CPT | Performed by: INTERNAL MEDICINE

## 2024-10-10 PROCEDURE — 99233 SBSQ HOSP IP/OBS HIGH 50: CPT | Performed by: HOSPITALIST

## 2024-10-10 PROCEDURE — 71045 X-RAY EXAM CHEST 1 VIEW: CPT | Performed by: INTERNAL MEDICINE

## 2024-10-10 RX ORDER — HYDROMORPHONE HYDROCHLORIDE 1 MG/ML
0.4 INJECTION, SOLUTION INTRAMUSCULAR; INTRAVENOUS; SUBCUTANEOUS EVERY 4 HOURS PRN
Status: DISCONTINUED | OUTPATIENT
Start: 2024-10-10 | End: 2024-10-12

## 2024-10-10 RX ORDER — METHYLPREDNISOLONE SODIUM SUCCINATE 40 MG/ML
40 INJECTION, POWDER, LYOPHILIZED, FOR SOLUTION INTRAMUSCULAR; INTRAVENOUS EVERY 12 HOURS
Status: DISCONTINUED | OUTPATIENT
Start: 2024-10-10 | End: 2024-10-11

## 2024-10-10 NOTE — DIETARY NOTE
[facility]   part of [facility]    NUTRITION ASSESSMENT    Pt meets severe malnutrition criteria at this time.    CRITERIA FOR MALNUTRITION DIAGNOSIS:  Criteria for severe malnutrition diagnosis: chronic illness related to wt loss greater than 5% in 1 month, body fat severe depletion, and muscle mass severe depletion      NUTRITION INTERVENTION:    RD nutrition Care Plan- Adjusted diet to least restrictive to maximize intake, Encouraged increased PO intake, and Encouraged small frequent meals with emphasis on high calorie/high protein  Meal and Snacks - Monitor and encourage adequate PO intake.   Medical Food Supplements - Magic Shake (Ensure/Magic Cup) 2pm daily snack. Rationale/use for oral supplements discussed.  Nutrition Education - discussed nutrient dense foods, shakes and smoothies at home, more frequent meals. Pt/family receptive to education, no barriers noted. Handouts provided.       PATIENT STATUS:     10/10 -  Visited patient in room for follow up. Reviewed last 3 meals, taking ~50% of food ordered - Dinner sandwich and chips, lunch chicken soup and sandwich. Ordered flakes, milk and fruit cup for breakfast today. Offered Ensure, not interested but did say that he would like a shake. Will trial strawberry Magic Shake.     Ongoing GOC discussion.    10/8 -  pt busy with other staff at time of RD visit.  Po intake remains poor not meeting nutritional needs.  Receiving pain meds for abdominal pain.  Pt and wife meeting with hospice today to discuss GOC.    10/4 -  pt not in room at time of RD visit.  Pt having procedure, plan for sofy.cystotomy tube. Pt transferred to ICU for lethargy, hypotension on 10/1 and then transferred back to floor 10/2.  Pt having abdominal pain and decreased intake.  Encourage po when able     10/01/24 at risk  53 year old male admitted with abdominal pain and swelling in legs.  Pt with metastatic esophageal and pancreatic cancer.  Met with pt and wife who report  decreased intake. He eats small amounts at meal times. He does not like ONS. He has severe muscle and fat depletion.  Pt also with lower extremity edema.        ANTHROPOMETRICS:  Ht: 188 cm (6' 2\")  Wt: 78.1 kg (172 lb 2.9 oz).   BMI: Body mass index is 22.11 kg/m².  IBW: 86.4 kg      WEIGHT HISTORY:   Weight loss: Yes, Severe Wt loss of 12 lbs, 7%, over 1 months     Wt Readings from Last 10 Encounters:   10/02/24 78.1 kg (172 lb 2.9 oz)   09/23/24 73.5 kg (162 lb)   09/15/24 70.3 kg (155 lb)   09/06/24 76.2 kg (168 lb)   08/19/24 76.2 kg (168 lb)   08/14/24 76.4 kg (168 lb 8 oz)   08/02/24 74.2 kg (163 lb 9.6 oz)   07/29/24 76.9 kg (169 lb 8 oz)   07/15/24 74.4 kg (164 lb)   07/09/24 73.5 kg (162 lb)        NUTRITION:  Diet:       Procedures    Regular/General diet Is Patient on Accuchecks? No      Food Allergies: No  Cultural/Ethnic/Rastafari Preferences Addressed: Yes    Percent Meals Eaten (last 3 days)       Date/Time Percent Meals Eaten (%)    10/08/24 1000 100 %     Percent Meals Eaten (%): 1 cup of yougart only at 10/08/24 1000    10/08/24 1700 50 %    10/09/24 1100 80 %            GI system review: WNL Last BM Date: 10/09/24  Skin and wounds: sacrum unstageable pressure injury per [facility] note    NUTRITION RELATED PHYSICAL FINDINGS:     1. Body Fat/Muscle Mass: severe depletion body fat Orbital fat pad and Buccal fat pad and severe muscle depletion Temple region and Clavicle region     2. Fluid Accumulation: lower extremity edema     NUTRITION PRESCRIPTION: 70.8kg  Calories: 0617-1357 calories/day (25-30 kcal/kg)  Protein:  grams protein/day (1.2-1.5 grams protein per kg)  Fluid: ~1 ml/kcal or per MD discretion    NUTRITION DIAGNOSIS/PROBLEM:  Malnutrition related to physiological causes as evidenced by documented/reported insufficient oral intake, documented/reported unintentional weight loss, loss of fat mass, and loss of muscle mass      MONITOR AND EVALUATE/NUTRITION GOALS:  PO intake of 75% of meals  TID - Not met, Continues  Weight stable within 1 to 2 lbs during admission - Ongoing      MEDICATIONS:  10/10 Pantoprazole, Abx    LABS:  Reviewed    Pt is at High nutrition risk    Dalila Cates RDN, LDN, [facility]  Clinical Dietitian   [id number]

## 2024-10-10 NOTE — PROGRESS NOTES
[facility]  Progress Note    Dontae Cortez Jr. Patient Status:  Inpatient    10/15/1970 MRN [id number]   Location [facility] 4NW-A Attending Megan Arshad MD   Hosp Day # 10 PCP Adrian Horowitz MD     Subjective:  Dontae Cortez Jr. is a(n) 53 year old male remains afeb   Continues to deny any shortness of breath or chest pain though notes lightheadedness when sitting up and off oxygen.  Currently without oxygen room air sat is 73%  Cough remains productive at times remains beige    Objective:  /79 (BP Location: Left arm)   Pulse 64   Temp 97.7 °F (36.5 °C) (Oral)   Resp 16   Ht 6' 2\" (1.88 m)   Wt 172 lb 2.9 oz (78.1 kg)   SpO2 91%   BMI 22.11 kg/m² on 7 L sats are 93%      Temp (24hrs), Av.2 °F (36.8 °C), Min:97.7 °F (36.5 °C), Max:98.5 °F (36.9 °C)      Intake/Output:    Intake/Output Summary (Last 24 hours) at 10/10/2024 1614  Last data filed at 10/10/2024 0705  Gross per 24 hour   Intake 185 ml   Output 360 ml   Net -175 ml       Physical Exam:   General: alert, cooperative, oriented.  No respiratory distress.   Head: Normocephalic, without obvious abnormality, atraumatic.   Throat: Lips, mucosa, and tongue normal.  No thrush noted.   Neck: trachea midline, no adenopathy, no thyromegaly. No JVD.   Lungs: Rales throughout the left greater than right    Chest wall: No tenderness or deformity.   Heart: Regular rate rhythm distant tones   Abdomen: soft, non-distended, no masses, no guarding, no     Rebound.  Less painful protuberant   Extremity: Thin   Skin: No rashes or lesions.   Neurological: Alert, interactive, no focal deficits    Lab Data Review:  No results for input(s): \"WBC\", \"HGB\", \"PLT\" in the last 72 hours.  No results for input(s): \"NA\", \"K\", \"CL\", \"CO2\", \"BUN\", \"CREATSERUM\", \"COMP\" in the last 72 hours.    Invalid input(s): \"ABG\"  Recent Labs   Lab 10/04/24  0630 10/04/24  1257   PTP 24.6* 24.2*   INR 2.20* 2.15*       Cultures: no new cultures      Radiology:  US VENOUS DOPPLER ARM LEFT - DIAG IMG (CPT=93971)    Result Date: 10/10/2024  CONCLUSION:  No DVT.  Mild subcutaneous edema.   LOCATION:  [id number]   Dictated by (CST): Freddy Cortes MD on 10/10/2024 at 1:48 PM     Finalized by (CST): Freddy Cortes MD on 10/10/2024 at 1:48 PM       XR CHEST AP PORTABLE  (CPT=71045)    Result Date: 10/10/2024  CONCLUSION:  Increased left lung consolidation.   LOCATION:  [place]      Dictated by (CST): Amilcar Jesus MD on 10/10/2024 at 8:14 AM     Finalized by (CST): Amilcar Jesus MD on 10/10/2024 at 8:15 AM      No dvt  Increased left infiltrates with decreased volumes         Medications reviewed     Assessment and Plan:   Patient Active Problem List   Diagnosis    Primary hypertension    Hyperlipidemia with target low density lipoprotein (LDL) cholesterol less than 70 mg/dL    Coronary artery disease involving coronary bypass graft of native heart with angina pectoris (HCC)    S/P CABG x 4    Nontoxic multinodular goiter    Pulmonary nodules    Thrombophlebitis leg    BRANDAN (generalized anxiety disorder)    Right shoulder Arthroscopy acromioplasty ,distal  clavicle resection, rotator cuff/labral debridment  Global 05/13/2021    Right bicipital tenosynovitis    Malignant neoplasm of esophagus (HCC)    Pleural effusion    Esophageal anastomotic leak    Esophageal obstruction    On total parenteral nutrition (TPN)    Mechanical complication of esophagostomy (HCC)    Abdominal pain of unknown etiology    Migration of esophageal stent    Gastroparesis    Esophageal carcinoma (HCC)    Normocytic anemia    Esophageal fistula    Subacute cough    Acquired bronchoesophageal fistula (HCC)    Pneumonia of both lower lobes due to infectious organism    Malignant neoplasm of pancreas (HCC)    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 difficile colitis    Dysphagia, unspecified type    Dyspnea and respiratory abnormalities    Hypokalemia    Dysphagia    Narcotic drug use    History of esophageal cancer    Palliative care by specialist    Neoplasm related pain    Endobronchial mass    Hyponatremia    Thrombocytopenia (HCC)    Acute kidney injury (HCC)    Hyperglycemia    Aspiration pneumonitis (HCC)    C. difficile diarrhea    Aspiration pneumonia (HCC)    Malignant neoplasm metastatic to liver (HCC)    Hyperlipidemia    Nausea vomiting and diarrhea    Fistula, bronchoesophageal (HCC)    Iron deficiency anemia, unspecified iron deficiency anemia type    Azotemia    Palliative care encounter    Goals of care, counseling/discussion    Cancer related pain    Anemia    Fistula    Acute cough    Pneumonia of right lung due to infectious organism, unspecified part of lung    Bacteremia    Acute postoperative pain    Exocrine pancreatic insufficiency (HCC)    Hypertriglyceridemia    Acute pulmonary embolism without acute cor pulmonale, unspecified pulmonary embolism type (HCC)    Acute deep vein thrombosis (DVT) of popliteal vein of right lower extremity (HCC)    Altered mental status, unspecified altered mental status type    Transaminitis    Biliary obstruction (HCC)    Hyperbilirubinemia    Esophageal adenocarcinoma (HCC)    Cancer associated pain    Abdominal pain, acute    Abnormal CT of the chest    Lower extremity edema    Abnormal finding of diagnostic imaging    Metastatic adenocarcinoma to esophagus (HCC)    Klebsiella pneumoniae sepsis (HCC)    Septic shock (HCC)    Acalculous cholecystitis    Severe malnutrition (HCC)    Metastatic adenocarcinoma (HCC)    Acute cholecystitis    Anxiety about health    Acute respiratory failure with hypoxia (HCC)        Assessment:  Acute hypoxic respiratory failure: Increasing degree of hypoxia  Klebsiella pneumoniae bacteremia/from drain as well- suspected due to acute cholecystitis-status post bile duct stent placement:  Pulmonary infiltrates-now increasing on the left-despite low-dose steroids and antibiotics discussed with infectious disease service with plans to adjust  Bilateral small pleural effusion/increasing fluid collection along liver  Nonocclusive pulmonary embolism and left upper lobe and left lower lobe branches of pulmonary artery-on Eliquis  Esophageal cancer status postchemotherapy received paclitaxel 2 weeks ago-progressive  History of fistula postop  History of pulmonary nodules.       Plan:  Progressive predominantly unilateral infiltrates despite steroids unclear suspected inflammatory-antibiotics to be adjusted  Continue titrating oxygen to maintain adequate saturation-especially when up walking  Plans in place for transition to hospice at the time of discharge on Saturday--reviewed with patient and his wife at bedside--would recommend minimum 10 L concentrator at the time of discharge    CC     Mago Zavala MD  10/10/2024  4:14 PM

## 2024-10-10 NOTE — CM/SW NOTE
SW attempted to reach patient's wife Alysha to discuss hospice options. SW was unable to reach and left a voicemail requesting a call back.     Update: MARIZA spoke with patient and patient's wife at bedside to discuss hospice services. Patient's wife reported that she is open to signing consents with [facility] but would not be able to bring patient home until Saturday due to their home currently being under construction. Prior to admission, patient began project in their bathroom and he is unable to finish it so contractors are coming tomorrow to complete the project. MARIZA discussed with patient's wife that hospice consents could be signed tomorrow with plan for DME delivery Saturday morning and plan for afternoon DC via Ambulance on Saturday afternoon. She is agreeable to this plan.     MARIZA sent message to [facility] requesting bedside meeting be scheduled for tomorrow with patient's wife for consents to be signed. SW will continue to follow.     SW will continue to follow for plan of care changes and remain available for any additional DC needs or concerns.     Akanksha Helms MSW, LSW  Discharge Planner   [phone]

## 2024-10-10 NOTE — PROGRESS NOTES
[facility] Progress Note   Length of Stay: 10    Subjective:   -Started on steroids and feels better  -He is not sure about hospice anymore but understands he can't get treatment in his condition     Oncology History   -2018: Per report had a normal EGD and colonoscopy     -June 2022: He met with GI due to new onset dysphagia which started about 1 month prior to his visit.  He had an esophagram with a focal abrupt narrowing with irregular margins in the distal one third of the esophagus extending to the GE junction.  He also had an episode of food impaction. He has also lost about 15 lb. He was a heavy smoker from age 14 to about 40 (1.5 PPD). Also with alcohol use currently. Mother with a history of breast and colon cancer.  EGD and EUS with Dr. Yadav on 6/7/2022: Severe esophagitis with a concerning mass extending 37-39 centimeters from the incisors.  Nonobstructive mass.  By EUS uT3N1 disease.  Pathology showed invasive moderate to poorly differentiated adenocarcinoma.  HER2 amplified by FISH. CT CAP done at [facility] on 6/16/22: Results not loaded to PACs yet.  CA 19-9 from the same day was 107.4.  CEA normal. PET/CT on 6/20/2022: Increased distal esophageal uptake with an SUV of 14.4.  Concern for shreya metastases.     -Concurrent chemoradiation with carboplatin AUC2 plus paclitaxel 50 mg/m2: July 2022-August 2022 with  down (280-->30). Post treatment PET/CT showed improvement.      -Surgery with Dr. Lu on 9/30/22: ypT1b pN0. Recovery has been complicated by an esophageal leak,      -I met with him on 12/12/22. We discussed adjuvant opdivo for 1 year. His  was elevated to 48 and repeat was 64. CT CAP was recommended.      -He was admitted on 12/25/22 for abdominal pain. He had a POEM procedure done. He was also noted to have a RLL consolidation. He was seen by pulmonary, based on imaging an outpatient PET/CT was recommended and a biopsy of the most FDG avid lesion  would be considered. Noted to have CAD and an outpatient evaluation was recommended. Outpatient PET/CT done on 1/4/23 was reviewed in tumor board and there was no focal area of concern and adjuvant immunotherapy recommended.      -Adjuvant opdivo:01/2023-03/2023. PET/CT in in 04/2023 showed uptake in the pancreatic head and tail. Also with some uptake in liver. EGD/EUS on 4/27/23 with Dr. Ritchie: Pancreatic head mass positive for adenocarcinoma: Comparison to previous esophageal cancer shows similarity but IHC in non-specific. Her 2 IHC was 2+ on this specimen but FISH was negative.  Given discordant results on initial HER2 and follow-up HER2 we decided to proceed with chemotherapy with immunotherapy with HER2 directed therapy.     -Chemo + Herceptin + Immunotherapy: He received 7 cycles of FOLFOX + Herceptin + Immunotherapy (05/2023-09/2023). Imaging after C5 showed a favorable response. After C7, we held oxaliplatin due to neuropathy and he was started on maintenance herceptin + IO maintenance. Signatera was negative 09/11/23.     -Herceptin + IO Maintenance: He received 3 cycles from 09/25/23-10/31/23. Treatment was delayed due to hospitalizations.      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 Due to new findings-restarting FOLFOX discussed.      -admitted from 1/8/24-1/13/24 for persistent dysphagia and bronchesophageal stent.      -1/17/24: EGD with fistula closure with ASD occluder and removal of the bronchial stent.     -FOLFOX + Herceptin + Opdivo restarted: 3 cycles: 02/2024-03/2024     -Treatment delayed again for recurrent hospital admissions     -He underwent a Latissimus dorsi  flap reconstruction for his bronchoesophageal fistula on 5/1/24     -PET/CT 5/15/24: at least 3 areas of uptake in the right hepatic lobe, uptake in pancrease and retroperitoneum. Post-operative uptake in chest wall.      -FOLFOX + Herceptin + Nivolumab Restarted: 6/11/2024 until 8/14/2024.  He received 5 cycles.  A PET scan after 5 cycles showed overall progression of metastatic disease with new gastrohepatic and peripancreatic lymph nodes along with enlargement of pancreatic and hepatic metastases.  There were also new lung nodules.  A CT-guided liver biopsy on 9/10/2024 was done which was positive for metastatic adenocarcinoma and the immunoprofile is compatible with the patient's esophageal primary. HER2 negative on FISH.     -He was admitted on 9/15/24 for abdominal pain. Labs showed elevated AST/ALT and T bili (6.7). CTA CAP showed progression of pancreatic and hepatic masses. There is intra-extra hepatic biliary ductal dilation. Subpleural reticular opacities noted-inflammatory or interstitial process? Hi Res CT recommended. He underwent an ERCP which showed a CBD stricture with biliary sphincterectomy and CBD, PD bile duct stent placement with Dr. Ritchie.     -Weekly paclitaxel 80 mg/m2: 3 weeks on 1 week off   -C1D1: 9/23/24:  42,110    ROS: 12 Point ROS completed and pertinent positives are above     Objective:    nystatin  5 mL Oral QID    methylPREDNISolone  60 mg Intravenous Q12H    oxyCODONE  10 mg Oral 4 times per day    metRONIDAZOLE  500 mg Oral 2 times per day    fluconazole  400 mg Oral Daily    ceFAZolin  2 g Intravenous Q8H    baclofen  5 mg Oral BID    vancomycin  125 mg Oral Daily    apixaban  5 mg Oral BID    OLANZapine  5 mg Oral Nightly    pantoprazole  40 mg Oral Before breakfast    pregabalin  25 mg Oral BID    sertraline  200 mg Oral Daily       LORazepam    oxyCODONE    loperamide    calcium carbonate    benzonatate    ipratropium-albuterol    heparin    acetaminophen     melatonin    glycerin-hypromellose-    sodium chloride    ondansetron    prochlorperazine    [DISCONTINUED] HYDROmorphone **OR** HYDROmorphone **OR** [DISCONTINUED] HYDROmorphone    Physical Exam  Vitals:    10/10/24 0426   BP: 141/79   Pulse: 64   Resp: 16   Temp: 97.7 °F (36.5 °C)     General: NAD, AOX3  HEENT: clear op, mmm, no jvd, no scleral icterus   CV: RRR S1S2 no murmurs, no edema  Lungs: CTAB, no increased work of breathing  Abd: soft nt nd +BS no hepatosplenomegaly  Neuro: CN: II-XII grossly intact    Labs/Imaging/Path:  Lab Results   Component Value Date    WBC 11.9 (H) 10/07/2024    HGB 8.2 (L) 10/07/2024    HCT 25.7 (L) 10/07/2024    MCV 93.1 10/07/2024    .0 10/07/2024       Recent Labs   Lab 10/05/24  0619 10/06/24  0623 10/07/24  0434   GLU 91 110* 104*   BUN 15 15 17   CREATSERUM 0.44* 0.44* 0.46*   CA 8.1* 8.3* 7.8*   ALB 2.5* 2.5* 2.4*    137 137   K 3.7 4.3  4.3 3.8    104 103   CO2 28.0 27.0 31.0   ALKPHO 165* 160* 147*   AST 32 28 27   ALT 16 11 7*   BILT 1.0 0.9 0.8   TP 4.9* 5.1* 4.9*       Imaging: Reviewed   CTA CAP on 9/30/24  There has been interval CBD stent placement.      Additionally, in the interim from the previous exam of 9/15/2024, there has been development of diffuse patchy ground-glass opacities within the lungs bilaterally, mild to moderate diffuse intra-abdominal ascites with mesenteric and omental stranding   diffusely as well as the development of subcutaneous edematous changes/stranding involving the entire visualized subcutaneous tissues of the chest, abdomen, pelvis and upper thighs.  Additionally, there has been interval increased gallbladder distension   with mild gallbladder wall thickening and diffuse pericholecystic fluid and fat stranding.  There is also interval development of colonic wall thickening involving the transverse to rectosigmoid colon.      Given the multiple interval changes, the appearance would suggest an acute, diffuse  inflammatory process.      The previously identified metastatic lesions of the liver and pancreas appear relatively stable.      No pulmonary emboli noted.     Assessment and Plan:   Goals of care: Discussion on 10/8.24: We discussed that his treatment is not curative and only palliative. Unfortunately, he has had recurrent hospitalizations for multiple issues which have delayed his chemotherapy. His tolerance to chemotherapy is also starting to decline significantly. We discussed the limited efficacy and toxicity of his remaining treatment options (I.e. taxol, FOLFIRI). Given the risks, benefits, his overall performance status and goals of care, I recommended that he consider hospice care. Discussing hospice options with his wife. He is hesitant to pursue hospice but we discussed again that his performance status and other medical conditions would prohibit him from chemotherapy. He is going to discuss with his wife.     Klebsiella pneumoniae Bacteremia: ID following. Possibly from recent CBD stent.    Hypoxic respiratory failure: on 02. Noted to have pulmonary infiltrates, small pleural effusions. Pulmonary is following. Started on steroids on 10/9/24.     Ascites: repeat US with trace ascites so para canceled.     Metastatic esophageal cancer  -Most recent imaging is consistent with metastatic disease progression.  Repeat HER2 is negative on FISH. We discussed that he will not be a good candidate for Cyramza due to history of esophageal fistula and PE/DVT. He is on weekly paclitaxel and received cycle 1 day 1 on 9/23/24. Treatment on hold.      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 CTA from 09/2024 shows resolution.     Coagulopathy: Note that INR elevation is likely from Eliquis and nutritional deficit. S/p one dose of Vit K prior to procedure. .      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 He is s/p ERCP on 9/17/24 with CBD, PD stent placement.      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 Now s/p repair 5/1/24.     Appreciate palliative care consultation     COURTNEY Foster MD  [facility]

## 2024-10-10 NOTE — HOSPICE RN NOTE
[facility] nursing visit for follow up on hospice consult order. Spoke with wife Alysha. Stated they are currently looking at several hospice agencies. No hospice agency has been selected. Offered to answer any questions or concerns.  [facility] will continue to follow up until family decides on hospice. Please call [facility] with any questions or concerns.    Bhavani Guillen RN, PN  [facility] Liaison  [phone] after hours

## 2024-10-10 NOTE — PLAN OF CARE
Pt A&Ox4 vital signs stable on 5L O2 NC. Severe/Moderate c/o pain to abd managed with scheduled and PRN medication. Poor appetite. Tolerating IV abx. US of LUE and Chest Xray to be completed. Surgical drain intact, see flowsheet for output. PT/OT to see. POC discussed, pt verbalized understanding. No further questions at this time. Call light within reach.

## 2024-10-10 NOTE — PROGRESS NOTES
INFECTIOUS DISEASE  PROGRESS NOTE            [facility]    Dontae Buddy Diego Champion Patient Status:  Inpatient    10/15/1970 MRN [id number]   Formerly [facility] 4SW-A Attending Sakina Hernandez MD   Hosp Day # 10 PCP Adrian Horowitz MD     Antibiotics: #10  Cefazolin #9  Metronidazole #7  Fluconazole #4    Subjective:  comfortable    Objective:  Temp:  [97.7 °F (36.5 °C)-98.5 °F (36.9 °C)] 97.7 °F (36.5 °C)  Pulse:  [64-81] 64  Resp:  [16-20] 16  BP: (141)/(75-79) 141/79  SpO2:  [91 %-97 %] 91 %    General: awake alert  Vital signs:Temp:  [97.7 °F (36.5 °C)-98.5 °F (36.9 °C)] 97.7 °F (36.5 °C)  Pulse:  [64-81] 64  Resp:  [16-20] 16  BP: (141)/(75-79) 141/79  SpO2:  [91 %-97 %] 91 %  HEENT: Moist mucous membranes. Extraocular muscles are intact.  Neck: supple no masses  Respiratory: Non labored, symmetric excursion, normal respirations  Port intact  Cardiovascular: no irregularities in rhythm  Abdomen, non distended, drain in place  Musculoskeletal: joints: no swelling   Integument: No lesions. No erythema. No open wounds.  Labs:     COVID-19 Lab Results    COVID-19  Lab Results   Component Value Date    COVID19 Not Detected 2024    COVID19 Not Detected 2024    COVID19 Not Detected 2024       Pro-Calcitonin  Recent Labs   Lab 10/06/24  0623   PCT 0.33*       Cardiac  No results for input(s): \"TROP\", \"PBNP\" in the last 168 hours.    Creatinine Kinase  No results for input(s): \"CK\" in the last 168 hours.    Inflammatory Markers  Recent Labs   Lab 10/07/24  043   CRP 7.90*       Recent Labs   Lab 10/07/24  0434   RBC 2.76*   HGB 8.2*   HCT 25.7*   MCV 93.1   MCH 29.7   MCHC 31.9   RDW 14.8   NEPRELIM 10.19*   WBC 11.9*   .0   NEUT 92   LYMPH 1   MON 5   EOS 1         Recent Labs   Lab 10/05/24  0619 10/06/24  0623 10/07/24  0434   GLU 91 110* 104*   BUN 15 15 17   CREATSERUM 0.44* 0.44* 0.46*   CA 8.1* 8.3* 7.8*   ALB 2.5* 2.5* 2.4*    137 137   K 3.7 4.3  4.3 3.8     104 103   CO2 28.0 27.0 31.0   ALKPHO 165* 160* 147*   AST 32 28 27   ALT 16 11 7*   BILT 1.0 0.9 0.8   TP 4.9* 5.1* 4.9*       No results found for: \"VANCT\"  Microbiology    Hospital Encounter on 09/30/24   1. Anaerobic Culture     Status: None    Collection Time: 10/04/24  2:40 PM    Specimen: Bile; Body fluid, unspecified   Result Value Ref Range    Anaerobic Culture No Anaerobes isolated N/A   2. Aerobic Bacterial Culture     Status: Abnormal    Collection Time: 10/04/24  2:40 PM    Specimen: Bile; Body fluid, unspecified   Result Value Ref Range    Aerobic Culture Result 1+ growth Klebsiella pneumoniae (A) N/A    Aerobic Culture Result 4+ growth Candida tropicalis (A) N/A    Aerobic Smear No WBCs seen N/A    Aerobic Smear No organisms seen N/A       Susceptibility    Klebsiella pneumoniae -  (no method available)     Ampicillin  Resistant      Ampicillin + Sulbactam <=2 Sensitive      Cefazolin <=4 Sensitive      Ciprofloxacin <=0.25 Sensitive      Gentamicin <=1 Sensitive      Meropenem <=0.25 Sensitive      Levofloxacin <=0.12 Sensitive      Piperacillin + Tazobactam <=4 Sensitive      Trimethoprim/Sulfa <=20 Sensitive    3. Blood Culture     Status: None    Collection Time: 10/01/24 12:19 PM    Specimen: Blood,peripheral   Result Value Ref Range    Blood Culture Result No Growth 5 Days N/A         Problem list reviewed:  Patient Active Problem List   Diagnosis    Primary hypertension    Hyperlipidemia with target low density lipoprotein (LDL) cholesterol less than 70 mg/dL    Coronary artery disease involving coronary bypass graft of native heart with angina pectoris (HCC)    S/P CABG x 4    Nontoxic multinodular goiter    Pulmonary nodules    Thrombophlebitis leg    BRANDAN (generalized anxiety disorder)    Right shoulder Arthroscopy acromioplasty ,distal  clavicle resection, rotator cuff/labral debridment  Global 05/13/2021    Right bicipital tenosynovitis    Malignant neoplasm of esophagus (HCC)    Pleural  effusion    Esophageal anastomotic leak    Esophageal obstruction    On total parenteral nutrition (TPN)    Mechanical complication of esophagostomy (HCC)    Abdominal pain of unknown etiology    Migration of esophageal stent    Gastroparesis    Esophageal carcinoma (HCC)    Normocytic anemia    Esophageal fistula    Subacute cough    Acquired bronchoesophageal fistula (HCC)    Pneumonia of both lower lobes due to infectious organism    Malignant neoplasm of pancreas (HCC)    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 difficile colitis    Dysphagia, unspecified type    Dyspnea and respiratory abnormalities    Hypokalemia    Dysphagia    Narcotic drug use    History of esophageal cancer    Palliative care by specialist    Neoplasm related pain    Endobronchial mass    Hyponatremia    Thrombocytopenia (HCC)    Acute kidney injury (HCC)    Hyperglycemia    Aspiration pneumonitis (HCC)    C. difficile diarrhea    Aspiration pneumonia (HCC)    Malignant neoplasm metastatic to liver (HCC)    Hyperlipidemia    Nausea vomiting and diarrhea    Fistula, bronchoesophageal (HCC)    Iron deficiency anemia, unspecified iron deficiency anemia type    Azotemia    Palliative care encounter    Goals of care, counseling/discussion    Cancer related pain    Anemia    Fistula    Acute cough    Pneumonia of right lung due to infectious organism, unspecified part of lung    Bacteremia    Acute postoperative pain    Exocrine pancreatic insufficiency (HCC)    Hypertriglyceridemia    Acute pulmonary embolism without acute cor pulmonale, unspecified pulmonary embolism type (HCC)    Acute deep vein thrombosis (DVT) of popliteal vein of right  lower extremity (HCC)    Altered mental status, unspecified altered mental status type    Transaminitis    Biliary obstruction (HCC)    Hyperbilirubinemia    Esophageal adenocarcinoma (HCC)    Cancer associated pain    Abdominal pain, acute    Abnormal CT of the chest    Lower extremity edema    Abnormal finding of diagnostic imaging    Metastatic adenocarcinoma to esophagus (HCC)    Klebsiella pneumoniae sepsis (HCC)    Septic shock (HCC)    Acalculous cholecystitis    Severe malnutrition (HCC)    Metastatic adenocarcinoma (HCC)    Acute cholecystitis    Anxiety about health    Acute respiratory failure with hypoxia (HCC)             ASSESSMENT/PLAN:  1. Klebsiella pneumoniae bacteremia in association with acute cholecystitis    -underlying metastatic esophageal cancer on chemo, port in place  -recent PD stent on 9/17 for malignant obstruction  Admitted 9/30 abdominal pain, RUQ tenderness, CT gallbladder distention, and positive HIDA    -SP IR cholecystostomy 10/4    Klebsiella pneumonia and candida tropicalis from IR drain      Continue cefazolin, flagyl, with addition of fluconazole for yeast      --PO levaquin, fluconazole, flagyl when dc    Patient now considering hospice    Conner Bell MD, MD [facility]  [phone]

## 2024-10-10 NOTE — PROGRESS NOTES
[facility]   part of [facility]     Hospitalist Progress Note     Dontae Buddy Cortez . Patient Status:  Inpatient    10/15/1970 MRN [id number]   Location [facility] 4NW-A Attending Nixon Vergara MD   Hosp Day # 10 PCP Adrian Horowitz MD     Chief Complaint: abdominal pain    Subjective:     Patient seen with patient's wife at bedside, left arm swollen.    Objective:    Review of Systems:   A comprehensive review of systems was completed; pertinent positive and negatives stated in subjective.    Vital signs:  Temp:  [97.7 °F (36.5 °C)-98.5 °F (36.9 °C)] 97.7 °F (36.5 °C)  Pulse:  [64-81] 64  Resp:  [16-20] 16  BP: (141)/(75-79) 141/79  SpO2:  [91 %-97 %] 91 %    Physical Exam:    /79 (BP Location: Left arm)   Pulse 64   Temp 97.7 °F (36.5 °C) (Oral)   Resp 16   Ht 6' 2\" (1.88 m)   Wt 172 lb 2.9 oz (78.1 kg)   SpO2 91%   BMI 22.11 kg/m²   On 3 L of oxygen by nasal cannula  General: No acute distress  Respiratory: No wheezes, no rhonchi  Cardiovascular: S1, S2, regular rate and rhythm  Abdomen: Soft, right upper quadrant tenderness, right-sided percutaneous cholecystostomy tube present draining bilious drainage, non-distended, positive bowel sounds  Neuro: No new focal deficits.   Extremities: LUE edema      Diagnostic Data:    Labs:  Recent Labs   Lab 10/04/24  0630 10/04/24  1257 10/05/24  0618 10/06/24  0623 10/07/24  0434   WBC 10.2  --  10.4 12.1* 11.9*   HGB 8.3*  --  8.0* 8.6* 8.2*   MCV 90.7  --  92.5 93.8 93.1   .0  --  163.0 184.0 197.0   BAND  --   --  1 1  --    INR 2.20* 2.15*  --   --   --        Recent Labs   Lab 10/05/24  0619 10/06/24  0623 10/07/24  0434   GLU 91 110* 104*   BUN 15 15 17   CREATSERUM 0.44* 0.44* 0.46*   CA 8.1* 8.3* 7.8*   ALB 2.5* 2.5* 2.4*    137 137   K 3.7 4.3  4.3 3.8    104 103   CO2 28.0 27.0 31.0   ALKPHO 165* 160* 147*   AST 32 28 27   ALT 16 11 7*   BILT 1.0 0.9 0.8   TP 4.9* 5.1* 4.9*       Estimated Creatinine Clearance:  202.8 mL/min (A) (based on SCr of 0.46 mg/dL (L)).    No results for input(s): \"TROP\", \"TROPHS\", \"CK\" in the last 168 hours.    Recent Labs   Lab 10/04/24  0630 10/04/24  1257   PTP 24.6* 24.2*   INR 2.20* 2.15*                  Microbiology    Hospital Encounter on 09/30/24   1. Anaerobic Culture     Status: None    Collection Time: 10/04/24  2:40 PM    Specimen: Bile; Body fluid, unspecified   Result Value Ref Range    Anaerobic Culture No Anaerobes isolated N/A   2. Aerobic Bacterial Culture     Status: Abnormal    Collection Time: 10/04/24  2:40 PM    Specimen: Bile; Body fluid, unspecified   Result Value Ref Range    Aerobic Culture Result 1+ growth Klebsiella pneumoniae (A) N/A    Aerobic Culture Result 4+ growth Candida tropicalis (A) N/A    Aerobic Smear No WBCs seen N/A    Aerobic Smear No organisms seen N/A       Susceptibility    Klebsiella pneumoniae -  (no method available)     Ampicillin  Resistant      Ampicillin + Sulbactam <=2 Sensitive      Cefazolin <=4 Sensitive      Ciprofloxacin <=0.25 Sensitive      Gentamicin <=1 Sensitive      Meropenem <=0.25 Sensitive      Levofloxacin <=0.12 Sensitive      Piperacillin + Tazobactam <=4 Sensitive      Trimethoprim/Sulfa <=20 Sensitive    3. Blood Culture     Status: None    Collection Time: 10/01/24 12:19 PM    Specimen: Blood,peripheral   Result Value Ref Range    Blood Culture Result No Growth 5 Days N/A         Imaging: Reviewed in Epic.   CT chest done on 10/5/2024 shows worsening fluid collection along the surface of the right lobe of the liver, worsening left greater than right groundglass opacity, small bilateral pleural effusion and atelectasis of the right lung base    Medications:    nystatin  5 mL Oral QID    methylPREDNISolone  60 mg Intravenous Q12H    oxyCODONE  10 mg Oral 4 times per day    metRONIDAZOLE  500 mg Oral 2 times per day    fluconazole  400 mg Oral Daily    ceFAZolin  2 g Intravenous Q8H    baclofen  5 mg Oral BID    vancomycin   125 mg Oral Daily    apixaban  5 mg Oral BID    OLANZapine  5 mg Oral Nightly    pantoprazole  40 mg Oral Before breakfast    pregabalin  25 mg Oral BID    sertraline  200 mg Oral Daily       Assessment & Plan:      # Klebsiella pneumonia/bacteremia/septic shock  #Abdominal pain, possible acalculous cholecystitis  S/p cholecystostomy 10/4  - CT chest done on 10/5/2024 shows worsening fluid collection along the surface of the right lobe of the liver, worsening left greater than right groundglass opacity, small bilateral pleural effusion and atelectasis of the right lung base.  Pulmonary following      #Metastatic esophageal cancer with previous resection and gastroesophageostomy  # History of prior bronchoesophageal fistula  # pancreatic mets  #Liver and pulmonary mets  # Anemia secondary to chemotherapy and malignancy  - oncology following, has had outpatient chemotherapy  -now hospice recommended, family to decide, no decision as of today     #H/o PE/DVT  Eliquis     #LE swelling, likely 3rd spacing, hypoalbuminemia     #Hypokalemia, replace PRN     #Cancer related pain, per palliative  ?celiac block for pain control was considered by GI during last admission according to patient  GI, oncology and palliative care following in hospital  Pain medications being adjusted by palliative care, discussed with palliative care APN     #HTN, controlled     #DL, statin    #PUD/GERD, PPi     #CAD with prev CABG    #Depression/anxiety, home meds resumed    #TREVOR, due to above, ATN, start gentle hydration/albumin, monitor  Resolved     #Coagulopathy, due to liver mets, was also on DOAC, stable    #Leukocytosis due to Klebsiella pneumoniae/bacteremia   -On IV antibiotics per ID  -wbc improved     #Severe malnutrition-dietitian following    #LUE swelling, US to r/o DVT    #Oral thrush, Nystatin swish and swallow ordered     Megan Arshad MD                Supplementary Documentation:     Quality:  DVT Mechanical Prophylaxis:   SCDs,  Early ambuation  DVT Pharmacologic Prophylaxis   Medication    heparin (Porcine) 100 Units/mL lock flush 500 Units    apixaban (Eliquis) tab 5 mg                Code Status: DNAR/Selective Treatment  Neumann: No urinary catheter in place  Neumann Duration (in days):   Central line:    SHUBHAM: 10/10/2024    Discharge is dependent on: progress  At this point Mr. Cortez is expected to be discharge to: home    The 21st Century Cures Act makes medical notes like these available to patients in the interest of transparency. Please be advised this is a medical document. Medical documents are intended to carry relevant information, facts as evident, and the clinical opinion of the practitioner. The medical note is intended as peer to peer communication and may appear blunt or direct. It is written in medical language and may contain abbreviations or verbiage that are unfamiliar.     Dietitian Malnutrition Assessment    Evaluation for Malnutrition: Criteria for severe malnutrition diagnosis- chronic illness related to   Wt loss greater than 5% in 1 month., Body fat severe depletion., Muscle mass severe depletion.               RD Malnutrition Care Plan: Adjusted diet to least restrictive to maximize intake., Encouraged increased PO intake., Encouraged small frequent meals with emphasis on high calorie/high protein.    Body Fat/Muscle Mass:          Physician Assessment     Patient has a diagnosis of severe malnutrition

## 2024-10-10 NOTE — PROGRESS NOTES
[facility]   part of [facility]  Palliative Care Progress Note    Dontae Cortez Jr. Patient Status:  Inpatient    10/15/1970 MRN [id number]   [facility], located within [facility] 4NW-A Attending Nixon Vergara MD   Hosp Day # 10 PCP Adrian Horowitz MD     History/Other:    Dontae Cortez Jr. is a 53 year old male with history of metastatic pancreatic cancer (dx 2022 see oncology consult for hx details) s/p esophagectomy and chemo (on current new tx regimen 1st cycle received 24), PE (Eliquis), HTN, CAD s/p CABG () who was admitted on 2024 for pain exacerbation, SOB and AMS (per wife). Work up in our hospital revealed CT A/P as noted below with generalized inflammatory processes and lungs with diffuse GGO bilat, also with hypokalemia, hyponatremia and BLE edema.      Consult ordered by:: Taylor Hitchcock for evaluation of Palliative Care needs and Uncontrolled symptoms.    Allergies:  Allergies   Allergen Reactions    Oxaliplatin HIVES     Shaking      Medications:     Current Facility-Administered Medications:     [DISCONTINUED] HYDROmorphone (Dilaudid) 1 MG/ML injection 0.2 mg, 0.2 mg, Intravenous, Q2H PRN **OR** HYDROmorphone (Dilaudid) 1 MG/ML injection 0.4 mg, 0.4 mg, Intravenous, Q4H PRN **OR** [DISCONTINUED] HYDROmorphone (Dilaudid) 1 MG/ML injection 0.8 mg, 0.8 mg, Intravenous, Q2H PRN    nystatin (Mycostatin) 172621 UNIT/ML oral suspension 500,000 Units, 5 mL, Oral, QID    methylPREDNISolone sodium succinate (Solu-MEDROL) injection 60 mg, 60 mg, Intravenous, Q12H    LORazepam (Ativan) tab 0.5 mg, 0.5 mg, Oral, BID PRN    oxyCODONE immediate release tab 10 mg, 10 mg, Oral, 4 times per day    oxyCODONE immediate release tab 10 mg, 10 mg, Oral, Q3H PRN    loperamide (Imodium) cap 2 mg, 2 mg, Oral, BID PRN    metRONIDAZOLE (Flagyl) tab 500 mg, 500 mg, Oral, 2 times per day    fluconazole (Diflucan) tab 400 mg, 400 mg, Oral, Daily    calcium carbonate (Tums) chewable tab 500  mg, 500 mg, Oral, TID PRN    benzonatate (Tessalon) cap 100 mg, 100 mg, Oral, TID PRN    ipratropium-albuterol (Duoneb) 0.5-2.5 (3) MG/3ML inhalation solution 3 mL, 3 mL, Nebulization, Q6H PRN    ceFAZolin (Ancef) 2g in 10mL IV syringe premix, 2 g, Intravenous, Q8H    baclofen (Lioresal) tab 5 mg, 5 mg, Oral, BID    vancomycin (Vancocin) cap 125 mg, 125 mg, Oral, Daily    heparin (Porcine) 100 Units/mL lock flush 500 Units, 5 mL, Intravenous, PRN    apixaban (Eliquis) tab 5 mg, 5 mg, Oral, BID    OLANZapine (ZyPREXA) tab 5 mg, 5 mg, Oral, Nightly    pantoprazole (Protonix) DR tab 40 mg, 40 mg, Oral, Before breakfast    pregabalin (Lyrica) cap 25 mg, 25 mg, Oral, BID    sertraline (Zoloft) tab 200 mg, 200 mg, Oral, Daily    acetaminophen (Tylenol Extra Strength) tab 500 mg, 500 mg, Oral, Q4H PRN    melatonin tab 3 mg, 3 mg, Oral, Nightly PRN    glycerin-hypromellose- (Artificial Tears) 0.2-0.2-1 % ophthalmic solution 1 drop, 1 drop, Both Eyes, QID PRN    sodium chloride (Saline Mist) 0.65 % nasal solution 1 spray, 1 spray, Each Nare, Q3H PRN    ondansetron (Zofran) 4 MG/2ML injection 4 mg, 4 mg, Intravenous, Q6H PRN    prochlorperazine (Compazine) 10 MG/2ML injection 5 mg, 5 mg, Intravenous, Q8H PRN    Subjective      Interval history:  RRT 10/1/24-> tx to ICU for lethargy, hypotension, sepsis- improved status & tx to floor 10/2/24. No surgery planned. 10/4/24- perc sofy drain placed. 10/5/24- CT w/ worsening fluid surrounding liver & pleural effusions, continued with abd discomfort, developed dyspnea/ hypoxia- pulm consulted. 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   10/8/24- pt/ wife met with hospice.     When I entered the room, the patient was awake, alert, and lying in bed. Spouse present at bedside.   Ronen reported he has been continuing to experience pain(see below).  He is taking Oxy as scheduled & using IV dilaudid for BTP.  We discussed his goal is to go home with  hospice, therefore we need to try po pain medications for BTP and re-assess response.     Symptom assessment:  Pain assessment:   24 hour (4927-4464) OME total: 155mg (oxy ir 50mg + iv hydromorphone 4.4mg).    Current pain: 5/10, described as sharp, located R side of abdomen, radiates to his back & up his back when severe.  He can no longer lay on his R side, worsens with touch/ movement, alleviated by opioids. Has feels most relief with IV hydromorphone. He has only used Oxy IR 10 PRN dosing 1x since 10/8.  He prefers it as it works quicker, but is willing to try Oxy IR PRN scheduling as outlined.    Pain goal: 3-4/10.     Anxiety:  Ronen reports he has had a lot on his mind during this admission, he admits he is having difficulty sleeping & resting at night due to worrying about the future & his health.     See summary of discussion below.     Review of Systems:  Dyspnea: with exertion   Coughdenies  Nausea:denies  Appetite: poor, eating bites.    Pertinent items are noted in subjective.  Bowel Movement    No data found in the last 1 encounters.         Objective:     Vital Signs:  Blood pressure 141/79, pulse 64, temperature 97.7 °F (36.5 °C), temperature source Oral, resp. rate 16, height 6' 2\" (1.88 m), weight 172 lb 2.9 oz (78.1 kg), SpO2 91%.  Body mass index is 22.11 kg/m².  Present Level of pain: 7/10  Non-verbal signs of pain present: Yes- facial grimacing with movement   Current Palliative performance scale PPSv2 (%): 50    Physical Exam:  General: Alert & Awake. In no apparent respiratory distress. Body habitus Thin.  HEENT: AT/NC. No gross focal deficits. MMM   Cardiac: RRR  Lungs:  diminished at bases  on NC  Abdomen: firm,  normal bowel sounds X 4 quadrants - not palpated per pt request, as discomfort worsens with touch.   Extremities: Trace LE edema present, mild edema to LUE  Neurologic: Alert and oriented to person, place, time, and situation   Psychiatric: Mood pleasant mood, anxious.   Skin: Warm  and dry.        Results:     Hematology:  Lab Results   Component Value Date    WBC 11.9 (H) 10/07/2024    HGB 8.2 (L) 10/07/2024    HCT 25.7 (L) 10/07/2024    .0 10/07/2024     Coags:  Lab Results   Component Value Date    PT 20.4 (H) 01/30/2014    INR 2.15 (H) 10/04/2024    PTT 80.5 (H) 06/19/2024     Chemistry:  Lab Results   Component Value Date    CREATSERUM 0.46 (L) 10/07/2024    BUN 17 10/07/2024     10/07/2024    K 3.8 10/07/2024     10/07/2024    CO2 31.0 10/07/2024     (H) 10/07/2024    CA 7.8 (L) 10/07/2024    ALB 2.4 (L) 10/07/2024    ALKPHO 147 (H) 10/07/2024    BILT 0.8 10/07/2024    TP 4.9 (L) 10/07/2024    AST 27 10/07/2024    ALT 7 (L) 10/07/2024    DDIMER 0.38 12/19/2023    BNP 33 08/15/2013    MG 1.8 09/20/2024    PHOS 2.9 03/15/2024    TROP <0.046 07/18/2017     Imaging:  US VENOUS DOPPLER ARM LEFT - DIAG IMG (CPT=93971)    Result Date: 10/10/2024  CONCLUSION:  No DVT.  Mild subcutaneous edema.   LOCATION:  [place] [id number]   Dictated by (CST): Freddy Cortes MD on 10/10/2024 at 1:48 PM     Finalized by (CST): Freddy Cortes MD on 10/10/2024 at 1:48 PM       XR CHEST AP PORTABLE  (CPT=71045)    Result Date: 10/10/2024  CONCLUSION:  Increased left lung consolidation.   LOCATION:  [place]      Dictated by (CST): Amilcar Jesus MD on 10/10/2024 at 8:14 AM     Finalized by (CST): Amilcar Jesus MD on 10/10/2024 at 8:15 AM       XR CHEST AP PORTABLE  (CPT=71045)    Result Date: 10/9/2024  CONCLUSION:    Extensive airspace disease throughout the upper mid and lower left lung slightly improved from the prior.  There may be small left effusion.  Mild areas of developing mixed interstitial and airspace opacity mid-lower lung on the right.  Small blunting right costophrenic angle.  No pneumothorax seen.  Stable cardiac enlargement.  Stable central line.  LOCATION:  [place]      Dictated by (CST): Ernesto Cabrera MD on 10/09/2024 at 8:30 AM     Finalized by (CST):  Ernesto Cabrera MD on 10/09/2024 at 8:31 AM           Summary of Discussion   I met with Ronen & Alysha today. They have decided to move forward with hospice & are working with SW to transition home with hospice care.     Ronen will try to use Oxy IR 10mg q3 PRN instead of IV dilaudid, if we need to adjust we can do so on 10/11.     Ronen & Alysha are appropriately processing the difficult decisions & conversations, and expressed they have a good amount of support.   Advance Care Planning counseling and discussion:   HC POA Documentation Completed--Document in [employer]. Healthcare Agent's Name: Alysha Mcduffie   POLST FORM Completed--Document signed and will be scanned, original & copies provided to Ronen.    DNAR/Selective Treatment    Assessment and Recommendations       Principal Problem:    Abdominal pain, acute  Active Problems:    Hypokalemia    Abnormal CT of the chest    Lower extremity edema    Abnormal finding of diagnostic imaging    Metastatic adenocarcinoma to esophagus (HCC)    Klebsiella pneumoniae sepsis (HCC)    Septic shock (HCC)    Acalculous cholecystitis    Severe malnutrition (HCC)    Metastatic adenocarcinoma (HCC)    Acute cholecystitis    Anxiety about health    Acute respiratory failure with hypoxia (HCC)    Goals of care:    Plans for home with hospice in the next couple of days, defer to PCP & SW.  Code Status: DNAR/Selective Treatment- will defer to hospice to re-address POLST.   Healthcare Agent's Name: Alysha Mcduffie    Symptoms:  Pain:    - Oxycodone IR 10mg q 6 scheduled & q3 PRN. - to be used 1st.  - If Oxy IR ineffective, then use IV hydromorphone  -IV hydromorphone 0.4mg [id number] PRN.  (changed from q2hPRN)  -Ronen has Oxy IR 10mg tablets, wife Alysha said they have #125 tablets available at home, which will allow pain management until hospice is able to provide supply.   -Continue lyrica 25mg bid.  -continue baclofen    Anxiety:   - continue lorazepam 0.5mg bid PRN.     Discussed  today's visit with DANY Hardwick, wife,  David.    Palliative Care Follow Up: Palliative care team will Continue to follow while inpatient.  None, will transition to hospice at [place].    Thank you for allowing Palliative Care services to participate in the care of Dontae Cortez .    A total of  40  minutes were spent on this follow up, which included all of the following: chart review, direct face to face contact, history taking, physical examination, counseling and coordinating care, and documentation.  >16 min of time spent discussing ACP  ARTEMIO Seymour, 10/10/24, 4:26 PM        Palliative Care Services    The 21st Century Cures Act makes medical notes like these available to patients in the interest of transparency. Please be advised this is a medical document. Medical documents are intended to carry relevant information, facts as evident, and the clinical opinion of the practitioner. The medical note is intended as peer to peer communication and may appear blunt or direct. It is written in medical language and may contain abbreviations or verbiage that are unfamiliar.

## 2024-10-10 NOTE — PLAN OF CARE
Patient is alert and oriented x 4. Uses call light appropriately, compliant with medications and care. No s/s of distress noted. Patient states pain is 8-9 out of 10 on pain scale. On oxygen 5 liters via nasal cannula for decreased oxygen saturations. Patient needs at least 10 liters via nasal cannula with ambulation. IV dilaudid changed to q4hprn due to patient increasing dependence upon medication for pain management. Order changed per hospice. Patient is agreeable to this change. VSS. No adverse events occurred this shift. Patient to have hospice discussion with family tomorrow AM. Safety precautions in place. Call light within reach.    Problem: GASTROINTESTINAL - ADULT  Goal: Maintains or returns to baseline bowel function  Description: INTERVENTIONS:  - Assess bowel function  - Maintain adequate hydration with IV or PO as ordered and tolerated  - Evaluate effectiveness of GI medications  - Encourage mobilization and activity  - Obtain nutritional consult as needed  - Establish a toileting routine/schedule  - Consider collaborating with pharmacy to review patient's medication profile  Outcome: Progressing     Problem: SKIN/TISSUE INTEGRITY - ADULT  Goal: Skin integrity remains intact  Description: INTERVENTIONS  - Assess and document risk factors for pressure ulcer development  - Assess and document skin integrity  - Monitor for areas of redness and/or skin breakdown  - Initiate interventions, skin care algorithm/standards of care as needed  Outcome: Progressing     Problem: SAFETY ADULT - FALL  Goal: Free from fall injury  Description: INTERVENTIONS:  - Assess pt frequently for physical needs  - Identify cognitive and physical deficits and behaviors that affect risk of falls.  - [place] fall precautions as indicated by assessment.  - Educate pt/family on patient safety including physical limitations  - Instruct pt to call for assistance with activity based on assessment  - Modify environment to reduce risk  of injury  - Provide assistive devices as appropriate  - Consider OT/PT consult to assist with strengthening/mobility  - Encourage toileting schedule  Outcome: Progressing     Problem: PAIN - ADULT  Goal: Verbalizes/displays adequate comfort level or patient's stated pain goal  Description: INTERVENTIONS:  - Encourage pt to monitor pain and request assistance  - Assess pain using appropriate pain scale  - Administer analgesics based on type and severity of pain and evaluate response  - Implement non-pharmacological measures as appropriate and evaluate response  - Consider cultural and social influences on pain and pain management  - Manage/alleviate anxiety  - Utilize distraction and/or relaxation techniques  - Monitor for opioid side effects  - Notify MD/LIP if interventions unsuccessful or patient reports new pain  - Anticipate increased pain with activity and pre-medicate as appropriate  Outcome: Not Progressing

## 2024-10-10 NOTE — PHYSICAL THERAPY NOTE
Order received, chart reviewed. Pt with dopplers pending to rule out DVT. Will hold per dept protocol and follow as appropriate.     Yris Rosado, PT, DPT  10/10/24

## 2024-10-10 NOTE — OCCUPATIONAL THERAPY NOTE
OT order received, chart reviewed. Patient is awaiting US doppler to rule out DVT. Will hold until results are known and patient is cleared for activity

## 2024-10-10 NOTE — CM/SW NOTE
Hospice consents meeting scheduled for tomorrow from 9-10am at bedside with [facility], patient and his wife.     SW will continue to follow for plan of care changes and remain available for any additional DC needs or concerns.     Akanksha Hemls MSW, LSW  Discharge Planner   [phone]

## 2024-10-11 PROCEDURE — 99231 SBSQ HOSP IP/OBS SF/LOW 25: CPT | Performed by: INTERNAL MEDICINE

## 2024-10-11 PROCEDURE — 99233 SBSQ HOSP IP/OBS HIGH 50: CPT | Performed by: HOSPITALIST

## 2024-10-11 RX ORDER — PREDNISONE 20 MG/1
40 TABLET ORAL 2 TIMES DAILY WITH MEALS
Status: DISCONTINUED | OUTPATIENT
Start: 2024-10-11 | End: 2024-10-12

## 2024-10-11 NOTE — PLAN OF CARE
Patient is alert and oriented x 4. No s/s of distress noted. VSS. Patient currently on 5-6 liters of oxygen via nasal cannula for occasional oxygen desaturation. Patient and family had meeting with hospice agency to discuss GOC this shift. Patient and family agreed to home hospice care starting tomorrow upon patient discharge from facility. Port a cath is clean, dry and intact flushing with sufficient blood return. Safety precautions in place for patient. Call light within reach.    Problem: MUSCULOSKELETAL - ADULT  Goal: Return mobility to safest level of function  Description: INTERVENTIONS:  - Assess patient stability and activity tolerance for standing, transferring and ambulating w/ or w/o assistive devices  - Assist with transfers and ambulation using safe patient handling equipment as needed  - Ensure adequate protection for wounds/incisions during mobilization  - Obtain PT/OT consults as needed  - Advance activity as appropriate  - Communicate ordered activity level and limitations with patient/family  Outcome: Progressing     Problem: PAIN - ADULT  Goal: Verbalizes/displays adequate comfort level or patient's stated pain goal  Description: INTERVENTIONS:  - Encourage pt to monitor pain and request assistance  - Assess pain using appropriate pain scale  - Administer analgesics based on type and severity of pain and evaluate response  - Implement non-pharmacological measures as appropriate and evaluate response  - Consider cultural and social influences on pain and pain management  - Manage/alleviate anxiety  - Utilize distraction and/or relaxation techniques  - Monitor for opioid side effects  - Notify MD/LIP if interventions unsuccessful or patient reports new pain  - Anticipate increased pain with activity and pre-medicate as appropriate  Outcome: Progressing     Problem: SAFETY ADULT - FALL  Goal: Free from fall injury  Description: INTERVENTIONS:  - Assess pt frequently for physical needs  - Identify  cognitive and physical deficits and behaviors that affect risk of falls.  - [place] fall precautions as indicated by assessment.  - Educate pt/family on patient safety including physical limitations  - Instruct pt to call for assistance with activity based on assessment  - Modify environment to reduce risk of injury  - Provide assistive devices as appropriate  - Consider OT/PT consult to assist with strengthening/mobility  - Encourage toileting schedule  Outcome: Progressing

## 2024-10-11 NOTE — PROGRESS NOTES
INFECTIOUS DISEASE  PROGRESS NOTE            [facility]    Dontae Annabelleniesha Cortez Jr. Patient Status:  Inpatient    10/15/1970 MRN [id number]   [facility] 4SW-A Attending Sakina Hernandez MD   Hosp Day # 11 PCP Adrian Horowitz MD     Antibiotics: #11  Cefazolin #10  Metronidazole #8  Fluconazole #5    Subjective:  Resting comfortable no sob  On 02 7L overnight    Objective:  Temp:  [97.3 °F (36.3 °C)-97.4 °F (36.3 °C)] 97.3 °F (36.3 °C)  Pulse:  [59-61] 61  Resp:  [14-16] 16  BP: (159-164)/() 164/103  SpO2:  [90 %-92 %] 90 %    General: awake alert  Vital signs:Temp:  [97.3 °F (36.3 °C)-97.4 °F (36.3 °C)] 97.3 °F (36.3 °C)  Pulse:  [59-61] 61  Resp:  [14-16] 16  BP: (159-164)/() 164/103  SpO2:  [90 %-92 %] 90 %  HEENT: 02 NC  Respiratory: Non labored, symmetric excursion, normal respirations  Port intact  Cardiovascular: no irregularities in rhythm  Abdomen, non distended, drain in place  Musculoskeletal: joints: no swelling   Integument: No lesions. No erythema. No open wounds.  Labs:     COVID-19 Lab Results    COVID-19  Lab Results   Component Value Date    COVID19 Not Detected 2024    COVID19 Not Detected 2024    COVID19 Not Detected 2024       Pro-Calcitonin  Recent Labs   Lab 10/06/24  0623   PCT 0.33*       Cardiac  No results for input(s): \"TROP\", \"PBNP\" in the last 168 hours.    Creatinine Kinase  No results for input(s): \"CK\" in the last 168 hours.    Inflammatory Markers  Recent Labs   Lab 10/07/24  043   CRP 7.90*       Recent Labs   Lab 10/07/24  0434   RBC 2.76*   HGB 8.2*   HCT 25.7*   MCV 93.1   MCH 29.7   MCHC 31.9   RDW 14.8   NEPRELIM 10.19*   WBC 11.9*   .0   NEUT 92   LYMPH 1   MON 5   EOS 1         Recent Labs   Lab 10/05/24  0619 10/06/24  0623 10/07/24  0434   GLU 91 110* 104*   BUN 15 15 17   CREATSERUM 0.44* 0.44* 0.46*   CA 8.1* 8.3* 7.8*   ALB 2.5* 2.5* 2.4*    137 137   K 3.7 4.3  4.3 3.8    104 103   CO2 28.0 27.0  31.0   ALKPHO 165* 160* 147*   AST 32 28 27   ALT 16 11 7*   BILT 1.0 0.9 0.8   TP 4.9* 5.1* 4.9*       No results found for: \"VANCT\"  Microbiology    Hospital Encounter on 09/30/24   1. Anaerobic Culture     Status: None    Collection Time: 10/04/24  2:40 PM    Specimen: Bile; Body fluid, unspecified   Result Value Ref Range    Anaerobic Culture No Anaerobes isolated N/A   2. Aerobic Bacterial Culture     Status: Abnormal    Collection Time: 10/04/24  2:40 PM    Specimen: Bile; Body fluid, unspecified   Result Value Ref Range    Aerobic Culture Result 1+ growth Klebsiella pneumoniae (A) N/A    Aerobic Culture Result 4+ growth Candida tropicalis (A) N/A    Aerobic Smear No WBCs seen N/A    Aerobic Smear No organisms seen N/A       Susceptibility    Klebsiella pneumoniae -  (no method available)     Ampicillin  Resistant      Ampicillin + Sulbactam <=2 Sensitive      Cefazolin <=4 Sensitive      Ciprofloxacin <=0.25 Sensitive      Gentamicin <=1 Sensitive      Meropenem <=0.25 Sensitive      Levofloxacin <=0.12 Sensitive      Piperacillin + Tazobactam <=4 Sensitive      Trimethoprim/Sulfa <=20 Sensitive    3. Blood Culture     Status: None    Collection Time: 10/01/24 12:19 PM    Specimen: Blood,peripheral   Result Value Ref Range    Blood Culture Result No Growth 5 Days N/A         Problem list reviewed:  Patient Active Problem List   Diagnosis    Primary hypertension    Hyperlipidemia with target low density lipoprotein (LDL) cholesterol less than 70 mg/dL    Coronary artery disease involving coronary bypass graft of native heart with angina pectoris (HCC)    S/P CABG x 4    Nontoxic multinodular goiter    Pulmonary nodules    Thrombophlebitis leg    BRANDAN (generalized anxiety disorder)    Right shoulder Arthroscopy acromioplasty ,distal  clavicle resection, rotator cuff/labral debridment  Global 05/13/2021    Right bicipital tenosynovitis    Malignant neoplasm of esophagus (HCC)    Pleural effusion    Esophageal  anastomotic leak    Esophageal obstruction    On total parenteral nutrition (TPN)    Mechanical complication of esophagostomy (HCC)    Abdominal pain of unknown etiology    Migration of esophageal stent    Gastroparesis    Esophageal carcinoma (HCC)    Normocytic anemia    Esophageal fistula    Subacute cough    Acquired bronchoesophageal fistula (HCC)    Pneumonia of both lower lobes due to infectious organism    Malignant neoplasm of pancreas (HCC)    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 difficile colitis    Dysphagia, unspecified type    Dyspnea and respiratory abnormalities    Hypokalemia    Dysphagia    Narcotic drug use    History of esophageal cancer    Palliative care by specialist    Neoplasm related pain    Pulmonary infiltrates    Hyponatremia    Thrombocytopenia (HCC)    Acute kidney injury (HCC)    Hyperglycemia    Aspiration pneumonitis (HCC)    C. difficile diarrhea    Aspiration pneumonia (HCC)    Malignant neoplasm metastatic to liver (HCC)    Hyperlipidemia    Nausea vomiting and diarrhea    Fistula, bronchoesophageal (HCC)    Iron deficiency anemia, unspecified iron deficiency anemia type    Azotemia    Palliative care encounter    Goals of care, counseling/discussion    Cancer related pain    Anemia    Fistula    Acute cough    Pneumonia of right lung due to infectious organism, unspecified part of lung    Bacteremia    Acute postoperative pain    Exocrine pancreatic insufficiency (HCC)    Hypertriglyceridemia    Acute pulmonary embolism without acute cor pulmonale, unspecified pulmonary embolism type (HCC)    Acute deep vein thrombosis (DVT) of popliteal vein of right lower extremity (HCC)     Altered mental status, unspecified altered mental status type    Transaminitis    Biliary obstruction (HCC)    Hyperbilirubinemia    Esophageal adenocarcinoma (HCC)    Cancer associated pain    Abdominal pain, acute    Abnormal CT of the chest    Lower extremity edema    Abnormal finding of diagnostic imaging    Metastatic adenocarcinoma to esophagus (HCC)    Klebsiella pneumoniae sepsis (HCC)    Septic shock (HCC)    Acalculous cholecystitis    Severe malnutrition (HCC)    Metastatic adenocarcinoma (HCC)    Acute cholecystitis    Anxiety about health    Acute respiratory failure with hypoxia (HCC)             ASSESSMENT/PLAN:  1. Klebsiella pneumoniae bacteremia in association with acute cholecystitis    -underlying metastatic esophageal cancer on chemo, port in place  -recent PD stent on 9/17 for malignant obstruction  Admitted 9/30 abdominal pain, RUQ tenderness, CT gallbladder distention, and positive HIDA    -SP IR cholecystostomy 10/4    Klebsiella pneumonia and candida tropicalis from IR drain      Continue cefazolin, flagyl, with addition of fluconazole for yeast      --PO levaquin, fluconazole, flagyl when dc    2. RLL infiltrate, hypoxia  On 02 pulm following  Already on antbitoics    Conner Bell MD, MD [facility]  [phone]

## 2024-10-11 NOTE — PHYSICAL THERAPY NOTE
Following for PT this date.  Noted plans for home with hospice.  Will dc as not consistent with end of life care.  Please reconsult if POC changes and if pt has potential to functionally improve.

## 2024-10-11 NOTE — PROGRESS NOTES
[facility] Progress Note   Length of Stay: 11    Subjective:   -Meeting with hospice and planning on going home tomorrow  -MATIASE doppler negative     Oncology History   -2018: Per report had a normal EGD and colonoscopy     -June 2022: He met with GI due to new onset dysphagia which started about 1 month prior to his visit.  He had an esophagram with a focal abrupt narrowing with irregular margins in the distal one third of the esophagus extending to the GE junction.  He also had an episode of food impaction. He has also lost about 15 lb. He was a heavy smoker from age 14 to about 40 (1.5 PPD). Also with alcohol use currently. Mother with a history of breast and colon cancer.  EGD and EUS with Dr. Yadav on 6/7/2022: Severe esophagitis with a concerning mass extending 37-39 centimeters from the incisors.  Nonobstructive mass.  By EUS uT3N1 disease.  Pathology showed invasive moderate to poorly differentiated adenocarcinoma.  HER2 amplified by FISH. CT CAP [facility] at [facility] on 6/16/22: Results not loaded to PACs yet.  CA 19-9 from the same day was 107.4.  CEA normal. PET/CT on 6/20/2022: Increased distal esophageal uptake with an SUV of 14.4.  Concern for shreya metastases.     -Concurrent chemoradiation with carboplatin AUC2 plus paclitaxel 50 mg/m2: July 2022-August 2022 with  down (280-->30). Post treatment PET/CT showed improvement.      -Surgery with Dr. Lu on 9/30/22: ypT1b pN0. Recovery has been complicated by an esophageal leak,      -I met with him on 12/12/22. We discussed adjuvant opdivo for 1 year. His  was elevated to 48 and repeat was 64. CT CAP was recommended.      -He was admitted on 12/25/22 for abdominal pain. He had a POEM procedure done. He was also noted to have a RLL consolidation. He was seen by pulmonary, based on imaging an outpatient PET/CT was recommended and a biopsy of the most FDG avid lesion would be considered. Noted to have CAD and an  outpatient evaluation was recommended. Outpatient PET/CT done on 1/4/23 was reviewed in tumor board and there was no focal area of concern and adjuvant immunotherapy recommended.      -Adjuvant opdivo:01/2023-03/2023. PET/CT in in 04/2023 showed uptake in the pancreatic head and tail. Also with some uptake in liver. EGD/EUS on 4/27/23 with Dr. Ritchie: Pancreatic head mass positive for adenocarcinoma: Comparison to previous esophageal cancer shows similarity but IHC in non-specific. Her 2 IHC was 2+ on this specimen but FISH was negative.  Given discordant results on initial HER2 and follow-up HER2 we decided to proceed with chemotherapy with immunotherapy with HER2 directed therapy.     -Chemo + Herceptin + Immunotherapy: He received 7 cycles of FOLFOX + Herceptin + Immunotherapy (05/2023-09/2023). Imaging after C5 showed a favorable response. After C7, we held oxaliplatin due to neuropathy and he was started on maintenance herceptin + IO maintenance. Signatera was negative 09/11/23.     -Herceptin + IO Maintenance: He received 3 cycles from 09/25/23-10/31/23. Treatment was delayed due to hospitalizations.      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 Due to new findings-restarting FOLFOX discussed.      -admitted from 1/8/24-1/13/24 for persistent dysphagia and bronchesophageal stent.      -1/17/24: EGD with fistula closure with ASD occluder and removal of the bronchial stent.     -FOLFOX + Herceptin + Opdivo restarted: 3 cycles: 02/2024-03/2024     -Treatment delayed again for recurrent hospital admissions     -He underwent a Latissimus dorsi flap reconstruction for his bronchoesophageal  fistula on 5/1/24     -PET/CT 5/15/24: at least 3 areas of uptake in the right hepatic lobe, uptake in pancrease and retroperitoneum. Post-operative uptake in chest wall.      -FOLFOX + Herceptin + Nivolumab Restarted: 6/11/2024 until 8/14/2024.  He received 5 cycles.  A PET scan after 5 cycles showed overall progression of metastatic disease with new gastrohepatic and peripancreatic lymph nodes along with enlargement of pancreatic and hepatic metastases.  There were also new lung nodules.  A CT-guided liver biopsy on 9/10/2024 was done which was positive for metastatic adenocarcinoma and the immunoprofile is compatible with the patient's esophageal primary. HER2 negative on FISH.     -He was admitted on 9/15/24 for abdominal pain. Labs showed elevated AST/ALT and T bili (6.7). CTA CAP showed progression of pancreatic and hepatic masses. There is intra-extra hepatic biliary ductal dilation. Subpleural reticular opacities noted-inflammatory or interstitial process? Hi Res CT recommended. He underwent an ERCP which showed a CBD stricture with biliary sphincterectomy and CBD, PD bile duct stent placement with Dr. Ritchie.     -Weekly paclitaxel 80 mg/m2: 3 weeks on 1 week off   -C1D1: 9/23/24:  42,110    ROS: 12 Point ROS completed and pertinent positives are above     Objective:    methylPREDNISolone  40 mg Intravenous Q12H    nystatin  5 mL Oral QID    oxyCODONE  10 mg Oral 4 times per day    metRONIDAZOLE  500 mg Oral 2 times per day    fluconazole  400 mg Oral Daily    ceFAZolin  2 g Intravenous Q8H    baclofen  5 mg Oral BID    vancomycin  125 mg Oral Daily    apixaban  5 mg Oral BID    OLANZapine  5 mg Oral Nightly    pantoprazole  40 mg Oral Before breakfast    pregabalin  25 mg Oral BID    sertraline  200 mg Oral Daily       [DISCONTINUED] HYDROmorphone **OR** HYDROmorphone **OR** [DISCONTINUED] HYDROmorphone    LORazepam    oxyCODONE    loperamide    calcium carbonate    benzonatate     ipratropium-albuterol    heparin    acetaminophen    melatonin    glycerin-hypromellose-    sodium chloride    ondansetron    prochlorperazine    Physical Exam  Vitals:    10/11/24 0557   BP: (!) 164/103   Pulse: 61   Resp: 16   Temp: 97.3 °F (36.3 °C)     General: NAD, AOX3  HEENT: clear op, mmm, no jvd, no scleral icterus   CV: RRR S1S2 no murmurs, LUE swelling   Lungs: CTAB, no increased work of breathing  Abd: soft nt nd +BS no hepatosplenomegaly  Neuro: CN: II-XII grossly intact    Labs/Imaging/Path:  Lab Results   Component Value Date    WBC 11.9 (H) 10/07/2024    HGB 8.2 (L) 10/07/2024    HCT 25.7 (L) 10/07/2024    MCV 93.1 10/07/2024    .0 10/07/2024       Recent Labs   Lab 10/05/24  0619 10/06/24  0623 10/07/24  0434   GLU 91 110* 104*   BUN 15 15 17   CREATSERUM 0.44* 0.44* 0.46*   CA 8.1* 8.3* 7.8*   ALB 2.5* 2.5* 2.4*    137 137   K 3.7 4.3  4.3 3.8    104 103   CO2 28.0 27.0 31.0   ALKPHO 165* 160* 147*   AST 32 28 27   ALT 16 11 7*   BILT 1.0 0.9 0.8   TP 4.9* 5.1* 4.9*       Imaging: Reviewed   CTA CAP on 9/30/24  There has been interval CBD stent placement.      Additionally, in the interim from the previous exam of 9/15/2024, there has been development of diffuse patchy ground-glass opacities within the lungs bilaterally, mild to moderate diffuse intra-abdominal ascites with mesenteric and omental stranding   diffusely as well as the development of subcutaneous edematous changes/stranding involving the entire visualized subcutaneous tissues of the chest, abdomen, pelvis and upper thighs.  Additionally, there has been interval increased gallbladder distension   with mild gallbladder wall thickening and diffuse pericholecystic fluid and fat stranding.  There is also interval development of colonic wall thickening involving the transverse to rectosigmoid colon.      Given the multiple interval changes, the appearance would suggest an acute, diffuse inflammatory process.       The previously identified metastatic lesions of the liver and pancreas appear relatively stable.      No pulmonary emboli noted.     Assessment and Plan:   Goals of care: Discussion on 10/8.24: We discussed that his treatment is not curative and only palliative. Unfortunately, he has had recurrent hospitalizations for multiple issues which have delayed his chemotherapy. His tolerance to chemotherapy is also starting to decline significantly. We discussed the limited efficacy and toxicity of his remaining treatment options (I.e. taxol, FOLFIRI). Given the risks, benefits, his overall performance status and goals of care, I recommended that he consider hospice care.   -Plan is to discharge to hospice tomorrow.     Klebsiella pneumoniae Bacteremia: ID following. Possibly from recent CBD stent.    Hypoxic respiratory failure: on 02. Noted to have pulmonary infiltrates, small pleural effusions. Pulmonary is following. Started on steroids on 10/9/24. Ok to continue from an oncology standpoint.     Ascites: repeat US with trace ascites so para canceled.     Metastatic esophageal cancer  -Most recent imaging is consistent with metastatic disease progression.  Repeat HER2 is negative on FISH. We discussed that he will not be a good candidate for Cyramza due to history of esophageal fistula and PE/DVT. He is on weekly paclitaxel and received cycle 1 day 1 on 9/23/24. As above, plan is for hospice due to worsening performance status.      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 CTA from 09/2024 shows resolution.     Coagulopathy: Note that INR elevation is likely from Eliquis and nutritional deficit. S/p one dose of Vit K prior to procedure. .      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 He is s/p ERCP on 9/17/24 with CBD, PD stent placement.      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 Now s/p repair 5/1/24.     Appreciate palliative care consultation     COURTNEY Foster MD  [facility]

## 2024-10-11 NOTE — CM/SW NOTE
MARIZA spoke with Emilie RN who reported that consents are signed and DME will be delivered tonight. Plan for DC tomorrow morning at 10am for Hospice start of care at 1pm at patient's home.     [facility] [phone] has been requested to arrange ambulance for  time of 10am tomorrow. PCS form completed.    MD and treatment team updated.     Update: MARIZA spoke with patient's wife and she is aware of DC time tomorrow.     MARIZA will continue to follow for plan of care changes and remain available for any additional DC needs or concerns.     Akanksha Helms MSW, LSW  Discharge Planner   [id number]

## 2024-10-11 NOTE — PLAN OF CARE
Pt received A&Ox4. VSS. 6L HFNC. Afebrile. Medications given per MAR. Radha drain patent & draining. Call light within reach. Fall precautions in place.     Problem: RESPIRATORY - ADULT  Goal: Achieves optimal ventilation and oxygenation  Description: INTERVENTIONS:  - Assess for changes in respiratory status  - Assess for changes in mentation and behavior  - Position to facilitate oxygenation and minimize respiratory effort  - Oxygen supplementation based on oxygen saturation or ABGs  - Provide Smoking Cessation handout, if applicable  - Encourage broncho-pulmonary hygiene including cough, deep breathe, Incentive Spirometry  - Assess the need for suctioning and perform as needed  - Assess and instruct to report SOB or any respiratory difficulty  - Respiratory Therapy support as indicated  - Manage/alleviate anxiety  - Monitor for signs/symptoms of CO2 retention  Outcome: Progressing     Problem: GASTROINTESTINAL - ADULT  Goal: Maintains or returns to baseline bowel function  Description: INTERVENTIONS:  - Assess bowel function  - Maintain adequate hydration with IV or PO as ordered and tolerated  - Evaluate effectiveness of GI medications  - Encourage mobilization and activity  - Obtain nutritional consult as needed  - Establish a toileting routine/schedule  - Consider collaborating with pharmacy to review patient's medication profile  Outcome: Progressing     Problem: METABOLIC/FLUID AND ELECTROLYTES - ADULT  Goal: Hemodynamic stability and optimal renal function maintained  Description: INTERVENTIONS:  - Monitor labs and assess for signs and symptoms of volume excess or deficit  - Monitor intake, output and patient weight  - Monitor urine specific gravity, serum osmolarity and serum sodium as indicated or ordered  - Monitor response to interventions for patient's volume status, including labs, urine output, blood pressure (other measures as available)  - Encourage oral intake as appropriate  - Instruct patient on  fluid and nutrition restrictions as appropriate  Outcome: Progressing     Problem: SKIN/TISSUE INTEGRITY - ADULT  Goal: Skin integrity remains intact  Description: INTERVENTIONS  - Assess and document risk factors for pressure ulcer development  - Assess and document skin integrity  - Monitor for areas of redness and/or skin breakdown  - Initiate interventions, skin care algorithm/standards of care as needed  Outcome: Progressing  Goal: Incision(s), wounds(s) or drain site(s) healing without S/S of infection  Description: INTERVENTIONS:  - Assess and document risk factors for pressure ulcer development  - Assess and document skin integrity  - Assess and document dressing/incision, wound bed, drain sites and surrounding tissue  - Implement wound care per orders  - Initiate isolation precautions as appropriate  - Initiate Pressure Ulcer prevention bundle as indicated  Outcome: Progressing     Problem: HEMATOLOGIC - ADULT  Goal: Maintains hematologic stability  Description: INTERVENTIONS  - Assess for signs and symptoms of bleeding or hemorrhage  - Monitor labs and vital signs for trends  - Administer supportive blood products/factors, fluids and medications as ordered and appropriate  - Administer supportive blood products/factors as ordered and appropriate  Outcome: Progressing  Goal: Free from bleeding injury  Description: (Example usage: patient with low platelets)  INTERVENTIONS:  - Avoid intramuscular injections, enemas and rectal medication administration  - Ensure safe mobilization of patient  - Hold pressure on venipuncture sites to achieve adequate hemostasis  - Assess for signs and symptoms of internal bleeding  - Monitor lab trends  - Patient is to report abnormal signs of bleeding to staff  - Avoid use of toothpicks and dental floss  - Use electric shaver for shaving  - Use soft bristle tooth brush  - Limit straining and forceful nose blowing  Outcome: Progressing     Problem: MUSCULOSKELETAL -  ADULT  Goal: Return mobility to safest level of function  Description: INTERVENTIONS:  - Assess patient stability and activity tolerance for standing, transferring and ambulating w/ or w/o assistive devices  - Assist with transfers and ambulation using safe patient handling equipment as needed  - Ensure adequate protection for wounds/incisions during mobilization  - Obtain PT/OT consults as needed  - Advance activity as appropriate  - Communicate ordered activity level and limitations with patient/family  Outcome: Progressing     Problem: CARDIOVASCULAR - ADULT  Goal: Maintains optimal cardiac output and hemodynamic stability  Description: INTERVENTIONS:  - Monitor vital signs, rhythm, and trends  - Monitor for bleeding, hypotension and signs of decreased cardiac output  - Evaluate effectiveness of vasoactive medications to optimize hemodynamic stability  - Monitor arterial and/or venous puncture sites for bleeding and/or hematoma  - Assess quality of pulses, skin color and temperature  - Assess for signs of decreased coronary artery perfusion - ex. Angina  - Evaluate fluid balance, assess for edema, trend weights  Outcome: Progressing     Problem: SAFETY ADULT - FALL  Goal: Free from fall injury  Description: INTERVENTIONS:  - Assess pt frequently for physical needs  - Identify cognitive and physical deficits and behaviors that affect risk of falls.  - [place] fall precautions as indicated by assessment.  - Educate pt/family on patient safety including physical limitations  - Instruct pt to call for assistance with activity based on assessment  - Modify environment to reduce risk of injury  - Provide assistive devices as appropriate  - Consider OT/PT consult to assist with strengthening/mobility  - Encourage toileting schedule  Outcome: Progressing

## 2024-10-11 NOTE — PROGRESS NOTES
[facility]   part of [facility]     Hospitalist Progress Note     Dontae Buddy Cortez . Patient Status:  Inpatient    10/15/1970 MRN [id number]   Location [facility] 4NW-A Attending Nixon Vergara MD   Hosp Day # 11 PCP Adrian Horowitz MD     Chief Complaint: abdominal pain    Subjective:     No acute events, still with abdominal pain requiring IV dilaudid, plan to go home with hospice in the morning.    Objective:    Review of Systems:   A comprehensive review of systems was completed; pertinent positive and negatives stated in subjective.    Vital signs:  Temp:  [97.3 °F (36.3 °C)-97.4 °F (36.3 °C)] 97.3 °F (36.3 °C)  Pulse:  [59-61] 61  Resp:  [14-16] 16  BP: (159-164)/() 164/103  SpO2:  [90 %-92 %] 90 %    Physical Exam:    BP (!) 164/103 (BP Location: Left arm)   Pulse 61   Temp 97.3 °F (36.3 °C) (Oral)   Resp 16   Ht 6' 2\" (1.88 m)   Wt 172 lb 2.9 oz (78.1 kg)   SpO2 90%   BMI 22.11 kg/m²   On 3 L of oxygen by nasal cannula  General: No acute distress  Respiratory: No wheezes, no rhonchi  Cardiovascular: S1, S2, regular rate and rhythm  Abdomen: Soft, right upper quadrant tenderness, right-sided percutaneous cholecystostomy tube present draining bilious drainage, non-distended, positive bowel sounds  Neuro: No new focal deficits.   Extremities: LUE edema      Diagnostic Data:    Labs:  Recent Labs   Lab 10/04/24  1257 10/05/24  0618 10/06/24  0623 10/07/24  0434   WBC  --  10.4 12.1* 11.9*   HGB  --  8.0* 8.6* 8.2*   MCV  --  92.5 93.8 93.1   PLT  --  163.0 184.0 197.0   BAND  --  1 1  --    INR 2.15*  --   --   --        Recent Labs   Lab 10/05/24  0619 10/06/24  0623 10/07/24  0434   GLU 91 110* 104*   BUN 15 15 17   CREATSERUM 0.44* 0.44* 0.46*   CA 8.1* 8.3* 7.8*   ALB 2.5* 2.5* 2.4*    137 137   K 3.7 4.3  4.3 3.8    104 103   CO2 28.0 27.0 31.0   ALKPHO 165* 160* 147*   AST 32 28 27   ALT 16 11 7*   BILT 1.0 0.9 0.8   TP 4.9* 5.1* 4.9*       Estimated Creatinine  Clearance: 202.8 mL/min (A) (based on SCr of 0.46 mg/dL (L)).    No results for input(s): \"TROP\", \"TROPHS\", \"CK\" in the last 168 hours.    Recent Labs   Lab 10/04/24  1257   PTP 24.2*   INR 2.15*                  Microbiology    Hospital Encounter on 09/30/24   1. Anaerobic Culture     Status: None    Collection Time: 10/04/24  2:40 PM    Specimen: Bile; Body fluid, unspecified   Result Value Ref Range    Anaerobic Culture No Anaerobes isolated N/A   2. Aerobic Bacterial Culture     Status: Abnormal    Collection Time: 10/04/24  2:40 PM    Specimen: Bile; Body fluid, unspecified   Result Value Ref Range    Aerobic Culture Result 1+ growth Klebsiella pneumoniae (A) N/A    Aerobic Culture Result 4+ growth Candida tropicalis (A) N/A    Aerobic Smear No WBCs seen N/A    Aerobic Smear No organisms seen N/A       Susceptibility    Klebsiella pneumoniae -  (no method available)     Ampicillin  Resistant      Ampicillin + Sulbactam <=2 Sensitive      Cefazolin <=4 Sensitive      Ciprofloxacin <=0.25 Sensitive      Gentamicin <=1 Sensitive      Meropenem <=0.25 Sensitive      Levofloxacin <=0.12 Sensitive      Piperacillin + Tazobactam <=4 Sensitive      Trimethoprim/Sulfa <=20 Sensitive    3. Blood Culture     Status: None    Collection Time: 10/01/24 12:19 PM    Specimen: Blood,peripheral   Result Value Ref Range    Blood Culture Result No Growth 5 Days N/A         Imaging: Reviewed in Epic.   CT chest done on 10/5/2024 shows worsening fluid collection along the surface of the right lobe of the liver, worsening left greater than right groundglass opacity, small bilateral pleural effusion and atelectasis of the right lung base    Medications:    methylPREDNISolone  40 mg Intravenous Q12H    nystatin  5 mL Oral QID    oxyCODONE  10 mg Oral 4 times per day    metRONIDAZOLE  500 mg Oral 2 times per day    fluconazole  400 mg Oral Daily    ceFAZolin  2 g Intravenous Q8H    baclofen  5 mg Oral BID    vancomycin  125 mg Oral  Daily    apixaban  5 mg Oral BID    OLANZapine  5 mg Oral Nightly    pantoprazole  40 mg Oral Before breakfast    pregabalin  25 mg Oral BID    sertraline  200 mg Oral Daily       Assessment & Plan:      # Klebsiella pneumonia/bacteremia/septic shock  #Abdominal pain, possible acalculous cholecystitis  S/p cholecystostomy 10/4  - CT chest done on 10/5/2024 shows worsening fluid collection along the surface of the right lobe of the liver, worsening left greater than right groundglass opacity, small bilateral pleural effusion and atelectasis of the right lung base.  Pulmonary following      #Metastatic esophageal cancer with previous resection and gastroesophageostomy  # History of prior bronchoesophageal fistula  # pancreatic mets  #Liver and pulmonary mets  # Anemia secondary to chemotherapy and malignancy  - oncology following, has had outpatient chemotherapy  -now hospice recommended, family to decide, no decision as of today     #H/o PE/DVT  Eliquis     #LE swelling, likely 3rd spacing, hypoalbuminemia     #Hypokalemia, replace PRN     #Cancer related pain, per palliative  ?celiac block for pain control was considered by GI during last admission according to patient  GI, oncology and palliative care following in hospital  Pain medications being adjusted by palliative care, discussed with palliative care APN     #HTN, controlled     #DL, statin    #PUD/GERD, PPi     #CAD with prev CABG    #Depression/anxiety, home meds resumed    #TREVOR, due to above, ATN, gentle hydration/albumin, monitor  Resolved     #Coagulopathy, due to liver mets, was also on DOAC, stable    #Leukocytosis due to Klebsiella pneumoniae/bacteremia   -On IV antibiotics per ID  -wbc improved  -home on PO levaquin, Flagyl, fluconazole     #Severe malnutrition-dietitian following    #LUE swelling, US negative for DVT    #Oral thrush, Nystatin swish and swallow ordered     Megan Arshad MD                Supplementary Documentation:     Quality:  DVT  Mechanical Prophylaxis:   SCDs, Early ambuation  DVT Pharmacologic Prophylaxis   Medication    heparin (Porcine) 100 Units/mL lock flush 500 Units    apixaban (Eliquis) tab 5 mg                Code Status: DNAR/Selective Treatment  Neumann: No urinary catheter in place  Neumann Duration (in days):   Central line:    SHUBHAM:     Discharge is dependent on: progress  At this point Mr. Cortez is expected to be discharge to: home    The 21st Century Cures Act makes medical notes like these available to patients in the interest of transparency. Please be advised this is a medical document. Medical documents are intended to carry relevant information, facts as evident, and the clinical opinion of the practitioner. The medical note is intended as peer to peer communication and may appear blunt or direct. It is written in medical language and may contain abbreviations or verbiage that are unfamiliar.     Dietitian Malnutrition Assessment    Evaluation for Malnutrition: Criteria for severe malnutrition diagnosis- chronic illness related to   Wt loss greater than 5% in 1 month., Body fat severe depletion., Muscle mass severe depletion.               RD Malnutrition Care Plan: Adjusted diet to least restrictive to maximize intake., Encouraged increased PO intake., Encouraged small frequent meals with emphasis on high calorie/high protein.    Body Fat/Muscle Mass:          Physician Assessment     Patient has a diagnosis of severe malnutrition

## 2024-10-11 NOTE — OCCUPATIONAL THERAPY NOTE
OT order received, chart reviewed. Per EMR, plan is for hospice care with DC to home set-up for tomorrow. Will sign off.

## 2024-10-11 NOTE — PROGRESS NOTES
[facility]  Progress Note    Dontae Cortez Jr. Patient Status:  Inpatient    10/15/1970 MRN [id number]   Location [facility] 4NW-A Attending Megan Arshad MD   Hosp Day # 11 PCP Adrian Horowitz MD     Subjective:  Dontae Cortez Jr. is a(n) 53 year old male remains without fever  Oxygenation remains about the same currently on 6 L  Increased fatigue overall  Respiratory status remains comfortable    Objective:  BP (!) 164/103 (BP Location: Left arm)   Pulse 61   Temp 97.3 °F (36.3 °C) (Oral)   Resp 16   Ht 6' 2\" (1.88 m)   Wt 172 lb 2.9 oz (78.1 kg)   SpO2 92%   BMI 22.11 kg/m² 6 L      Temp (24hrs), Av.4 °F (36.3 °C), Min:97.3 °F (36.3 °C), Max:97.4 °F (36.3 °C)      Intake/Output:    Intake/Output Summary (Last 24 hours) at 10/11/2024 1631  Last data filed at 10/11/2024 0130  Gross per 24 hour   Intake --   Output 210 ml   Net -210 ml       Physical Exam:   General: Sleepy but arousable   Head: Normocephalic, without obvious abnormality, atraumatic.   Throat: Lips, mucosa, and tongue normal.  No thrush noted.   Neck: trachea midline, no adenopathy, no thyromegaly. No JVD.   Lungs: Diminished tones bilaterally left greater than right   Chest wall: No tenderness or deformity.   Heart: Regular rate and rhythm   Abdomen: soft, non-distended, no masses, no guarding, no     Rebound.  Currently nontender   Extremity: Nontender   Skin: No rashes or lesions.   Neurological: Alert, interactive, no focal deficits    Lab Data Review:  No results for input(s): \"WBC\", \"HGB\", \"PLT\" in the last 72 hours.  No results for input(s): \"NA\", \"K\", \"CL\", \"CO2\", \"BUN\", \"CREATSERUM\", \"COMP\" in the last 72 hours.    Invalid input(s): \"ABG\"  No results for input(s): \"PTP\", \"INR\", \"PTT\" in the last 168 hours.    Cultures: No new data    Radiology:  No results found.  Reviewed      Medications reviewed     Assessment and Plan:   Patient Active Problem List   Diagnosis    Primary hypertension    Hyperlipidemia  with target low density lipoprotein (LDL) cholesterol less than 70 mg/dL    Coronary artery disease involving coronary bypass graft of native heart with angina pectoris (HCC)    S/P CABG x 4    Nontoxic multinodular goiter    Pulmonary nodules    Thrombophlebitis leg    BRANDAN (generalized anxiety disorder)    Right shoulder Arthroscopy acromioplasty ,distal  clavicle resection, rotator cuff/labral debridment  Global 05/13/2021    Right bicipital tenosynovitis    Malignant neoplasm of esophagus (HCC)    Pleural effusion    Esophageal anastomotic leak    Esophageal obstruction    On total parenteral nutrition (TPN)    Mechanical complication of esophagostomy (HCC)    Abdominal pain of unknown etiology    Migration of esophageal stent    Gastroparesis    Esophageal carcinoma (HCC)    Normocytic anemia    Esophageal fistula    Subacute cough    Acquired bronchoesophageal fistula (HCC)    Pneumonia of both lower lobes due to infectious organism    Malignant neoplasm of pancreas (HCC)    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 difficile colitis    Dysphagia, unspecified type    Dyspnea and respiratory abnormalities    Hypokalemia    Dysphagia    Narcotic drug use    History of esophageal cancer    Palliative care by specialist    Neoplasm related pain    Pulmonary infiltrates    Hyponatremia    Thrombocytopenia (HCC)    Acute kidney injury (HCC)    Hyperglycemia    Aspiration pneumonitis (HCC)    C. difficile diarrhea    Aspiration pneumonia (HCC)    Malignant neoplasm metastatic to liver (HCC)    Hyperlipidemia    Nausea vomiting and diarrhea    Fistula, bronchoesophageal (HCC)    Iron deficiency anemia, unspecified  iron deficiency anemia type    Azotemia    Palliative care encounter    Goals of care, counseling/discussion    Cancer related pain    Anemia    Fistula    Acute cough    Pneumonia of right lung due to infectious organism, unspecified part of lung    Bacteremia    Acute postoperative pain    Exocrine pancreatic insufficiency (HCC)    Hypertriglyceridemia    Acute pulmonary embolism without acute cor pulmonale, unspecified pulmonary embolism type (HCC)    Acute deep vein thrombosis (DVT) of popliteal vein of right lower extremity (HCC)    Altered mental status, unspecified altered mental status type    Transaminitis    Biliary obstruction (HCC)    Hyperbilirubinemia    Esophageal adenocarcinoma (HCC)    Cancer associated pain    Abdominal pain, acute    Abnormal CT of the chest    Lower extremity edema    Abnormal finding of diagnostic imaging    Metastatic adenocarcinoma to esophagus (HCC)    Klebsiella pneumoniae sepsis (HCC)    Septic shock (HCC)    Acalculous cholecystitis    Severe malnutrition (HCC)    Metastatic adenocarcinoma (HCC)    Acute cholecystitis    Anxiety about health    Acute respiratory failure with hypoxia (HCC)       Assessment:  Acute hypoxic respiratory failure: Increasing degree of hypoxia  Klebsiella pneumoniae bacteremia/from drain as well- suspected due to acute cholecystitis-status post bile duct stent placement:  Pulmonary infiltrates-now increasing on the left-despite low-dose steroids and antibiotics discussed with infectious disease service with plans to adjust  Bilateral small pleural effusion/increasing fluid collection along liver  Nonocclusive pulmonary embolism and left upper lobe and left lower lobe branches of pulmonary artery-on Eliquis  Esophageal cancer status postchemotherapy received paclitaxel 2 weeks ago-progressive  History of fistula postop  History of pulmonary nodules.    Plan:  Plans in place for discharge to hospice in a.m.--10 L concentrator would be needed  Plan  for short course oral steroids  We remain available    CC     Mago Zavala MD  10/11/2024  4:31 PM

## 2024-10-11 NOTE — PAYOR COMM NOTE
10/11   CONTINUED STAY REVIEW    Payor: [employer]  Subscriber #:  [id number]  Authorization Number: [id number]    Admit date: 9/30/24  Admit time: 10:48 AM      MEDICATIONS ADMINISTERED IN LAST 1 DAY:  apixaban (Eliquis) tab 5 mg       Date Action Dose Route User    10/11/2024 0851 Given 5 mg Oral Bridget Voss RN    10/10/2024 2035 Given 5 mg Oral Deborah Pierre RN          baclofen (Lioresal) tab 5 mg       Date Action Dose Route User    10/11/2024 0851 Given 5 mg Oral Bridget Voss RN    10/10/2024 2035 Given 5 mg Oral Deborah Pierre RN          ceFAZolin (Ancef) 2g in 10mL IV syringe premix       Date Action Dose Route User    10/11/2024 1153 New Bag 2 g Intravenous Bridget Voss RN    10/11/2024 0341 New Bag 2 g Intravenous Deborah Pierre RN    10/10/2024 2035 New Bag 2 g Intravenous Deborah Pierre RN          fluconazole (Diflucan) tab 400 mg       Date Action Dose Route User    10/10/2024 1555 Given 400 mg Oral Bridget Voss RN          HYDROmorphone (Dilaudid) 1 MG/ML injection 0.4 mg       Date Action Dose Route User    10/10/2024 1555 Given 0.4 mg Intravenous Bridget Voss RN          HYDROmorphone (Dilaudid) 1 MG/ML injection 0.4 mg       Date Action Dose Route User    10/11/2024 1301 Given 0.4 mg Intravenous Bridget Voss RN    10/11/2024 0341 Given 0.4 mg Intravenous Deborah Pierre RN    10/10/2024 2035 Given 0.4 mg Intravenous Deborah Pierre RN          LORazepam (Ativan) tab 0.5 mg       Date Action Dose Route User    10/11/2024 1013 Given 0.5 mg Oral Bridget Voss RN    10/10/2024 2047 Given 0.5 mg Oral Deborah Pierre RN          methylPREDNISolone sodium succinate (Solu-MEDROL) injection 40 mg       Date Action Dose Route User    10/11/2024 0552 Given 40 mg Intravenous Deborah Pierre RN    10/10/2024 2035 Given 40 mg Intravenous Deborah Pierre, DANY          metRONIDAZOLE (Flagyl) tab 500 mg       Date Action Dose Route User     10/11/2024 0851 Given 500 mg Oral Bridget Voss RN    10/10/2024 2044 Given 500 mg Oral Deborah Pierre RN          nystatin (Mycostatin) 325181 UNIT/ML oral suspension 500,000 Units       Date Action Dose Route User    10/11/2024 1301 Given 500,000 Units Oral Bridget Voss RN    10/11/2024 0851 Given 500,000 Units Oral Bridget Voss RN    10/10/2024 2035 Given 500,000 Units Oral Deborah Pierre RN    10/10/2024 1555 Given 500,000 Units Oral Bridget Voss RN          OLANZapine (ZyPREXA) tab 5 mg       Date Action Dose Route User    10/10/2024 2035 Given 5 mg Oral Deborah Pierre RN          oxyCODONE immediate release tab 10 mg       Date Action Dose Route User    10/11/2024 1152 Given 10 mg Oral Bridget Voss RN    10/11/2024 0552 Given 10 mg Oral Deborah Pierre RN    10/10/2024 1734 Given 10 mg Oral Bridget Voss RN          oxyCODONE immediate release tab 10 mg       Date Action Dose Route User    10/11/2024 1012 Given 10 mg Oral Bridget Voss RN    10/10/2024 2250 Given 10 mg Oral Deborah Pierre RN          pantoprazole (Protonix) DR tab 40 mg       Date Action Dose Route User    10/11/2024 0552 Given 40 mg Oral Deborah Pierre RN          pregabalin (Lyrica) cap 25 mg       Date Action Dose Route User    10/11/2024 0851 Given 25 mg Oral Bridget Voss RN    10/10/2024 2035 Given 25 mg Oral Deborah Pierre RN          sertraline (Zoloft) tab 200 mg       Date Action Dose Route User    10/11/2024 0851 Given 200 mg Oral Bridget Voss RN          vancomycin (Vancocin) cap 125 mg       Date Action Dose Route User    10/11/2024 0851 Given 125 mg Oral Bridget Voss RN            Vitals (last day)       Date/Time Temp Pulse Resp BP SpO2 Weight O2 Device O2 Flow Rate (L/min) [facility]    10/11/24 0930 -- -- -- -- 92 % -- High flow nasal cannula 7 L/min EM    10/11/24 0557 97.3 °F (36.3 °C) 61 16 164/103 90 % -- High flow nasal cannula 7 L/min     10/10/24 2100 97.4 °F  (36.3 °C) 59 14 159/84 92 % -- High flow nasal cannula 6 L/min SF    10/10/24 0802 -- -- -- -- 91 % -- Nasal cannula 5 L/min AF    10/10/24 0426 97.7 °F (36.5 °C) 64 16 141/79 96 % -- Nasal cannula 5 L/min SF     10/11 ID NOTE      Antibiotics: #11  Cefazolin #10  Metronidazole #8  Fluconazole #5     Subjective:  Resting comfortable no sob  On 02 7L overnight     Objective:  Temp:  [97.3 °F (36.3 °C)-97.4 °F (36.3 °C)] 97.3 °F (36.3 °C)  Pulse:  [59-61] 61  Resp:  [14-16] 16  BP: (159-164)/() 164/103  SpO2:  [90 %-92 %] 90 %   HEENT: 02 NC  Respiratory: Non labored, symmetric excursion, normal respirations  Port intact  Cardiovascular: no irregularities in rhythm  Abdomen, non distended, drain in place  Musculoskeletal: joints: no swelling   Integument: No lesions. No erythema. No open wounds.            ASSESSMENT/PLAN:  1. Klebsiella pneumoniae bacteremia in association with acute cholecystitis     -underlying metastatic esophageal cancer on chemo, port in place  -recent PD stent on 9/17 for malignant obstruction  Admitted 9/30 abdominal pain, RUQ tenderness, CT gallbladder distention, and positive HIDA     -SP IR cholecystostomy 10/4     Klebsiella pneumonia and candida tropicalis from IR drain        Continue cefazolin, flagyl, with addition of fluconazole for yeast        --PO levaquin, fluconazole, flagyl when dc     2. RLL infiltrate, hypoxia  On 02 pulm following  Already on antbitoics    10/11  HEMA/ONC NOTE  bjective:   -Meeting with hospice and planning on going home tomorrow  -LUE doppler negative        Physical Exam      Vitals:     10/11/24 0557   BP: (!) 164/103   Pulse: 61   Resp: 16   Temp: 97.3 °F (36.3 °C)      Assessment and Plan:   Goals of care: Discussion on 10/8.24: We discussed that his treatment is not curative and only palliative. Unfortunately, he has had recurrent hospitalizations for multiple issues which have delayed his chemotherapy. His tolerance to chemotherapy is also  starting to decline significantly. We discussed the limited efficacy and toxicity of his remaining treatment options (I.e. taxol, FOLFIRI). Given the risks, benefits, his overall performance status and goals of care, I recommended that he consider hospice care.   -Plan is to discharge to hospice tomorrow.      Klebsiella pneumoniae Bacteremia: ID following. Possibly from recent CBD stent.     Hypoxic respiratory failure: on 02. Noted to have pulmonary infiltrates, small pleural effusions. Pulmonary is following. Started on steroids on 10/9/24. Ok to continue from an oncology standpoint.      Ascites: repeat US with trace ascites so para canceled.      Metastatic esophageal cancer  -Most recent imaging is consistent with metastatic disease progression.  Repeat HER2 is negative on FISH. We discussed that he will not be a good candidate for Cyramza due to history of esophageal fistula and PE/DVT. He is on weekly paclitaxel and received cycle 1 day 1 on 9/23/24. As above, plan is for hospice due to worsening performance status.      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 CTA from 09/2024 shows resolution.      Coagulopathy: Note that INR elevation is likely from Eliquis and nutritional deficit. S/p one dose of Vit K prior to procedure. .      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 He is s/p ERCP on 9/17/24 with CBD, PD stent placement.      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 Now s/p repair 5/1/24.      Appreciate palliative care consultation      10/11 PAUL/MARIZA NOTE  SW spoke with Emilie SAENZ who reported [facility] Plan for [facility] tomorrow morning at 10am for Hospice start of care at 1pm at patient's home.      [facility] [phone] has been requested to arrange ambulance for  time of 10am tomorrow. PCS form completed.     MD and treatment team updated.      MARIZA will continue to follow for plan of care changes and remain  available for any additional DC needs or concerns.

## 2024-10-12 VITALS
WEIGHT: 172.19 LBS | HEART RATE: 58 BPM | SYSTOLIC BLOOD PRESSURE: 136 MMHG | BODY MASS INDEX: 22.1 KG/M2 | RESPIRATION RATE: 18 BRPM | HEIGHT: 74 IN | DIASTOLIC BLOOD PRESSURE: 81 MMHG | OXYGEN SATURATION: 98 % | TEMPERATURE: 98 F

## 2024-10-12 PROCEDURE — 99239 HOSP IP/OBS DSCHRG MGMT >30: CPT | Performed by: HOSPITALIST

## 2024-10-12 RX ORDER — BACLOFEN 5 MG/1
5 TABLET ORAL 2 TIMES DAILY
Qty: 90 TABLET | Refills: 1 | Status: SHIPPED | OUTPATIENT
Start: 2024-10-12

## 2024-10-12 RX ORDER — ACETAMINOPHEN 650 MG/1
650 SUPPOSITORY RECTAL EVERY 6 HOURS PRN
Status: DISCONTINUED | OUTPATIENT
Start: 2024-10-12 | End: 2024-10-12

## 2024-10-12 RX ORDER — LORAZEPAM 0.5 MG/1
0.5 TABLET ORAL 2 TIMES DAILY PRN
Qty: 60 TABLET | Refills: 2 | Status: SHIPPED | OUTPATIENT
Start: 2024-10-12

## 2024-10-12 RX ORDER — NYSTATIN 100000 [USP'U]/ML
5 SUSPENSION ORAL 4 TIMES DAILY
Qty: 150 ML | Refills: 0 | Status: SHIPPED | OUTPATIENT
Start: 2024-10-12

## 2024-10-12 RX ORDER — PREDNISONE 10 MG/1
TABLET ORAL
Qty: 47 TABLET | Refills: 0 | Status: SHIPPED | OUTPATIENT
Start: 2024-10-12

## 2024-10-12 NOTE — PROGRESS NOTES
[facility]   part of [facility]     Hospitalist Progress Note     Dontae Buddy Cortez . Patient Status:  Inpatient    10/15/1970 MRN [id number]   Location [facility] 4NW-A Attending Nixon Vergara MD   Hosp Day # 12 PCP Adrian Horowitz MD     Chief Complaint: abdominal pain    Subjective:     No acute events, still with abdominal pain, plan to go home with hospice today.    Objective:    Review of Systems:   A comprehensive review of systems was completed; pertinent positive and negatives stated in subjective.    Vital signs:  Temp:  [97.4 °F (36.3 °C)-97.6 °F (36.4 °C)] 97.4 °F (36.3 °C)  Pulse:  [60-65] 60  Resp:  [16] 16  BP: (137-157)/(80-83) 157/83  SpO2:  [92 %-95 %] 95 %    Physical Exam:    /83 (BP Location: Left arm)   Pulse 60   Temp 97.4 °F (36.3 °C) (Oral)   Resp 16   Ht 6' 2\" (1.88 m)   Wt 172 lb 2.9 oz (78.1 kg)   SpO2 95%   BMI 22.11 kg/m²   On 3 L of oxygen by nasal cannula  General: No acute distress  Respiratory: No wheezes, no rhonchi  Cardiovascular: S1, S2, regular rate and rhythm  Abdomen: Soft, right upper quadrant tenderness, right-sided percutaneous cholecystostomy tube present draining bilious drainage, non-distended, positive bowel sounds  Neuro: No new focal deficits.   Extremities: LUE edema      Diagnostic Data:    Labs:  Recent Labs   Lab 10/06/24  0623 10/07/24  0434   WBC 12.1* 11.9*   HGB 8.6* 8.2*   MCV 93.8 93.1   .0 197.0   BAND 1  --        Recent Labs   Lab 10/06/24  0623 10/07/24  0434   * 104*   BUN 15 17   CREATSERUM 0.44* 0.46*   CA 8.3* 7.8*   ALB 2.5* 2.4*    137   K 4.3  4.3 3.8    103   CO2 27.0 31.0   ALKPHO 160* 147*   AST 28 27   ALT 11 7*   BILT 0.9 0.8   TP 5.1* 4.9*       Estimated Creatinine Clearance: 202.8 mL/min (A) (based on SCr of 0.46 mg/dL (L)).    No results for input(s): \"TROP\", \"TROPHS\", \"CK\" in the last 168 hours.    No results for input(s): \"PTP\", \"INR\" in the last 168 hours.                  Microbiology    Hospital Encounter on 09/30/24   1. Anaerobic Culture     Status: None    Collection Time: 10/04/24  2:40 PM    Specimen: Bile; Body fluid, unspecified   Result Value Ref Range    Anaerobic Culture No Anaerobes isolated N/A   2. Aerobic Bacterial Culture     Status: Abnormal    Collection Time: 10/04/24  2:40 PM    Specimen: Bile; Body fluid, unspecified   Result Value Ref Range    Aerobic Culture Result 1+ growth Klebsiella pneumoniae (A) N/A    Aerobic Culture Result 4+ growth Candida tropicalis (A) N/A    Aerobic Smear No WBCs seen N/A    Aerobic Smear No organisms seen N/A       Susceptibility    Klebsiella pneumoniae -  (no method available)     Ampicillin  Resistant      Ampicillin + Sulbactam <=2 Sensitive      Cefazolin <=4 Sensitive      Ciprofloxacin <=0.25 Sensitive      Gentamicin <=1 Sensitive      Meropenem <=0.25 Sensitive      Levofloxacin <=0.12 Sensitive      Piperacillin + Tazobactam <=4 Sensitive      Trimethoprim/Sulfa <=20 Sensitive    3. Blood Culture     Status: None    Collection Time: 10/01/24 12:19 PM    Specimen: Blood,peripheral   Result Value Ref Range    Blood Culture Result No Growth 5 Days N/A         Imaging: Reviewed in Epic.   CT chest done on 10/5/2024 shows worsening fluid collection along the surface of the right lobe of the liver, worsening left greater than right groundglass opacity, small bilateral pleural effusion and atelectasis of the right lung base    Medications:    predniSONE  40 mg Oral BID with meals    nystatin  5 mL Oral QID    oxyCODONE  10 mg Oral 4 times per day    metRONIDAZOLE  500 mg Oral 2 times per day    fluconazole  400 mg Oral Daily    ceFAZolin  2 g Intravenous Q8H    baclofen  5 mg Oral BID    vancomycin  125 mg Oral Daily    apixaban  5 mg Oral BID    OLANZapine  5 mg Oral Nightly    pantoprazole  40 mg Oral Before breakfast    pregabalin  25 mg Oral BID    sertraline  200 mg Oral Daily       Assessment & Plan:      #  Klebsiella pneumonia/bacteremia/septic shock  #Abdominal pain, possible acalculous cholecystitis  S/p cholecystostomy 10/4  - CT chest done on 10/5/2024 shows worsening fluid collection along the surface of the right lobe of the liver, worsening left greater than right groundglass opacity, small bilateral pleural effusion and atelectasis of the right lung base.  Pulmonary following      #Metastatic esophageal cancer with previous resection and gastroesophageostomy  # History of prior bronchoesophageal fistula  # pancreatic mets  #Liver and pulmonary mets  # Anemia secondary to chemotherapy and malignancy  - oncology following, has had outpatient chemotherapy  -now hospice recommended, family to decide, no decision as of today     #H/o PE/DVT  Eliquis     #LE swelling, likely 3rd spacing, hypoalbuminemia     #Hypokalemia, replace PRN     #Cancer related pain, per palliative  ?celiac block for pain control was considered by GI during last admission according to patient  GI, oncology and palliative care following in hospital  Pain medications being adjusted by palliative care, discussed with palliative care APN     #HTN, controlled     #DL, statin    #PUD/GERD, PPi     #CAD with prev CABG    #Depression/anxiety, home meds resumed    #TREVOR, due to above, ATN, gentle hydration/albumin, monitor  Resolved     #Coagulopathy, due to liver mets, was also on DOAC, stable    #Leukocytosis due to Klebsiella pneumoniae/bacteremia   -On IV antibiotics per ID  -wbc improved  -home on PO levaquin, Flagyl, fluconazole     #Severe malnutrition-dietitian following    #LUE swelling, US negative for DVT    #Oral thrush, Nystatin swish and swallow ordered     Megan Arshad MD                Supplementary Documentation:     Quality:  DVT Mechanical Prophylaxis:   SCDs, Early ambuation  DVT Pharmacologic Prophylaxis   Medication    heparin (Porcine) 100 Units/mL lock flush 500 Units    apixaban (Eliquis) tab 5 mg                Code Status:  DNAR/Selective Treatment  Neumann: No urinary catheter in place  Neumann Duration (in days):   Central line:    SHUBHAM:     Discharge is dependent on: progress  At this point Mr. Cortez is expected to be discharge to: home    The 21st Century Cures Act makes medical notes like these available to patients in the interest of transparency. Please be advised this is a medical document. Medical documents are intended to carry relevant information, facts as evident, and the clinical opinion of the practitioner. The medical note is intended as peer to peer communication and may appear blunt or direct. It is written in medical language and may contain abbreviations or verbiage that are unfamiliar.     Dietitian Malnutrition Assessment    Evaluation for Malnutrition: Criteria for severe malnutrition diagnosis- chronic illness related to   Wt loss greater than 5% in 1 month., Body fat severe depletion., Muscle mass severe depletion.               RD Malnutrition Care Plan: Adjusted diet to least restrictive to maximize intake., Encouraged increased PO intake., Encouraged small frequent meals with emphasis on high calorie/high protein.    Body Fat/Muscle Mass:          Physician Assessment     Patient has a diagnosis of severe malnutrition

## 2024-10-12 NOTE — PROGRESS NOTES
[facility]  Progress Note    Dontae Cortez Jr. Patient Status:  Inpatient    10/15/1970 MRN [id number]   Location [facility] 4NW-A Attending Megan Arshad MD   Hosp Day # 12 PCP Adrian Horowitz MD     Subjective:  Dontae Cortez Jr. is a(n) 53 year old male overall feels better  Pain under better control  Notes dyspnea when up  Currently however on 10 L at rest sats of 96%    Objective:  /83 (BP Location: Left arm)   Pulse 60   Temp 97.4 °F (36.3 °C) (Oral)   Resp 16   Ht 6' 2\" (1.88 m)   Wt 172 lb 2.9 oz (78.1 kg)   SpO2 95%   BMI 22.11 kg/m² 96% currently      Temp (24hrs), Av.5 °F (36.4 °C), Min:97.4 °F (36.3 °C), Max:97.6 °F (36.4 °C)      Intake/Output:    Intake/Output Summary (Last 24 hours) at 10/12/2024 1124  Last data filed at 10/12/2024 0800  Gross per 24 hour   Intake --   Output 1000 ml   Net -1000 ml       Physical Exam:   General: alert, cooperative, oriented.  No respiratory distress.   Head: Normocephalic, without obvious abnormality, atraumatic.   Throat: Lips, mucosa, and tongue normal.  No thrush noted.   Neck: trachea midline, no adenopathy, no thyromegaly. No JVD.   Lungs: Increasing rales left greater than right no auscultated wheeze   Chest wall: No tenderness or deformity.   Heart: Regular rate and rhythm   Abdomen: soft, non-distended, no masses, no guarding, no     Rebound.  Seems softer   Extremity: No significant edema   Skin: No rashes or lesions.   Neurological: Alert, interactive, no focal deficits    Lab Data Review:  No results for input(s): \"WBC\", \"HGB\", \"PLT\" in the last 72 hours.  No results for input(s): \"NA\", \"K\", \"CL\", \"CO2\", \"BUN\", \"CREATSERUM\", \"COMP\" in the last 72 hours.    Invalid input(s): \"ABG\"  No results for input(s): \"PTP\", \"INR\", \"PTT\" in the last 168 hours.    Cultures: Reviewed    Radiology:  No results found.  Reviewed      Medications reviewed     Assessment and Plan:   Patient Active Problem List   Diagnosis    Primary  hypertension    Hyperlipidemia with target low density lipoprotein (LDL) cholesterol less than 70 mg/dL    Coronary artery disease involving coronary bypass graft of native heart with angina pectoris (HCC)    S/P CABG x 4    Nontoxic multinodular goiter    Pulmonary nodules    Thrombophlebitis leg    BRANDAN (generalized anxiety disorder)    Right shoulder Arthroscopy acromioplasty ,distal  clavicle resection, rotator cuff/labral debridment  Global 05/13/2021    Right bicipital tenosynovitis    Malignant neoplasm of esophagus (HCC)    Pleural effusion    Esophageal anastomotic leak    Esophageal obstruction    On total parenteral nutrition (TPN)    Mechanical complication of esophagostomy (HCC)    Abdominal pain of unknown etiology    Migration of esophageal stent    Gastroparesis    Esophageal carcinoma (HCC)    Normocytic anemia    Esophageal fistula    Subacute cough    Acquired bronchoesophageal fistula (HCC)    Pneumonia of both lower lobes due to infectious organism    Malignant neoplasm of pancreas (HCC)    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 difficile colitis    Dysphagia, unspecified type    Dyspnea and respiratory abnormalities    Hypokalemia    Dysphagia    Narcotic drug use    History of esophageal cancer    Palliative care by specialist    Neoplasm related pain    Pulmonary infiltrates    Hyponatremia    Thrombocytopenia (HCC)    Acute kidney injury (HCC)    Hyperglycemia    Aspiration pneumonitis (HCC)    C. difficile diarrhea    Aspiration pneumonia (HCC)    Malignant neoplasm metastatic to liver (HCC)    Hyperlipidemia    Nausea vomiting and diarrhea    Fistula, bronchoesophageal (HCC)    Iron  deficiency anemia, unspecified iron deficiency anemia type    Azotemia    Palliative care encounter    Goals of care, counseling/discussion    Cancer related pain    Anemia    Fistula    Acute cough    Pneumonia of right lung due to infectious organism, unspecified part of lung    Bacteremia    Acute postoperative pain    Exocrine pancreatic insufficiency (HCC)    Hypertriglyceridemia    Acute pulmonary embolism without acute cor pulmonale, unspecified pulmonary embolism type (HCC)    Acute deep vein thrombosis (DVT) of popliteal vein of right lower extremity (HCC)    Altered mental status, unspecified altered mental status type    Transaminitis    Biliary obstruction (HCC)    Hyperbilirubinemia    Esophageal adenocarcinoma (HCC)    Cancer associated pain    Abdominal pain, acute    Abnormal CT of the chest    Lower extremity edema    Abnormal finding of diagnostic imaging    Metastatic adenocarcinoma to esophagus (HCC)    Klebsiella pneumoniae sepsis (HCC)    Septic shock (HCC)    Acalculous cholecystitis    Severe malnutrition (HCC)    Metastatic adenocarcinoma (HCC)    Acute cholecystitis    Anxiety about health    Acute respiratory failure with hypoxia (HCC)       Assessment:  Acute hypoxic respiratory failure: Increasing degree of hypoxia with advancing left greater than right pulmonary infiltrates possibly multifactorial  Klebsiella pneumoniae bacteremia/from drain as well- suspected due to acute cholecystitis-status post bile duct stent placement:  Pulmonary infiltrates-now increasing on the left-despite low-dose steroids and antibiotics discussed with infectious disease service with plans to adjust  Bilateral small pleural effusion/increasing fluid collection along liver  Nonocclusive pulmonary embolism and left upper lobe and left lower lobe branches of pulmonary artery-on Eliquis chronically  Esophageal cancer status postchemotherapy received paclitaxel 2 weeks ago-progressive  History of fistula  postop  History of pulmonary nodules.    Plan:  Plan still in place for home with hospice  Will need 10 L concentrator  Plan to continue modest doses of steroids  ID note appreciated plan for p.o. Levaquin in addition to Flagyl and fluconazole at the time of discharge    CC     Mago Zavala MD  10/12/2024  11:24 AM

## 2024-10-12 NOTE — PLAN OF CARE
Pt received A&Ox4. VSS. 7L HFNC. Afebrile. Medications given per MAR. Call light within reach. Fall precautions in place.     Problem: CARDIOVASCULAR - ADULT  Goal: Maintains optimal cardiac output and hemodynamic stability  Description: INTERVENTIONS:  - Monitor vital signs, rhythm, and trends  - Monitor for bleeding, hypotension and signs of decreased cardiac output  - Evaluate effectiveness of vasoactive medications to optimize hemodynamic stability  - Monitor arterial and/or venous puncture sites for bleeding and/or hematoma  - Assess quality of pulses, skin color and temperature  - Assess for signs of decreased coronary artery perfusion - ex. Angina  - Evaluate fluid balance, assess for edema, trend weights  Outcome: Progressing     Problem: RESPIRATORY - ADULT  Goal: Achieves optimal ventilation and oxygenation  Description: INTERVENTIONS:  - Assess for changes in respiratory status  - Assess for changes in mentation and behavior  - Position to facilitate oxygenation and minimize respiratory effort  - Oxygen supplementation based on oxygen saturation or ABGs  - Provide Smoking Cessation handout, if applicable  - Encourage broncho-pulmonary hygiene including cough, deep breathe, Incentive Spirometry  - Assess the need for suctioning and perform as needed  - Assess and instruct to report SOB or any respiratory difficulty  - Respiratory Therapy support as indicated  - Manage/alleviate anxiety  - Monitor for signs/symptoms of CO2 retention  Outcome: Progressing     Problem: GASTROINTESTINAL - ADULT  Goal: Maintains or returns to baseline bowel function  Description: INTERVENTIONS:  - Assess bowel function  - Maintain adequate hydration with IV or PO as ordered and tolerated  - Evaluate effectiveness of GI medications  - Encourage mobilization and activity  - Obtain nutritional consult as needed  - Establish a toileting routine/schedule  - Consider collaborating with pharmacy to review patient's medication  profile  Outcome: Progressing     Problem: METABOLIC/FLUID AND ELECTROLYTES - ADULT  Goal: Hemodynamic stability and optimal renal function maintained  Description: INTERVENTIONS:  - Monitor labs and assess for signs and symptoms of volume excess or deficit  - Monitor intake, output and patient weight  - Monitor urine specific gravity, serum osmolarity and serum sodium as indicated or ordered  - Monitor response to interventions for patient's volume status, including labs, urine output, blood pressure (other measures as available)  - Encourage oral intake as appropriate  - Instruct patient on fluid and nutrition restrictions as appropriate  Outcome: Progressing     Problem: SKIN/TISSUE INTEGRITY - ADULT  Goal: Skin integrity remains intact  Description: INTERVENTIONS  - Assess and document risk factors for pressure ulcer development  - Assess and document skin integrity  - Monitor for areas of redness and/or skin breakdown  - Initiate interventions, skin care algorithm/standards of care as needed  Outcome: Progressing  Goal: Incision(s), wounds(s) or drain site(s) healing without S/S of infection  Description: INTERVENTIONS:  - Assess and document risk factors for pressure ulcer development  - Assess and document skin integrity  - Assess and document dressing/incision, wound bed, drain sites and surrounding tissue  - Implement wound care per orders  - Initiate isolation precautions as appropriate  - Initiate Pressure Ulcer prevention bundle as indicated  Outcome: Progressing     Problem: HEMATOLOGIC - ADULT  Goal: Maintains hematologic stability  Description: INTERVENTIONS  - Assess for signs and symptoms of bleeding or hemorrhage  - Monitor labs and vital signs for trends  - Administer supportive blood products/factors, fluids and medications as ordered and appropriate  - Administer supportive blood products/factors as ordered and appropriate  Outcome: Progressing  Goal: Free from bleeding injury  Description:  (Example usage: patient with low platelets)  INTERVENTIONS:  - Avoid intramuscular injections, enemas and rectal medication administration  - Ensure safe mobilization of patient  - Hold pressure on venipuncture sites to achieve adequate hemostasis  - Assess for signs and symptoms of internal bleeding  - Monitor lab trends  - Patient is to report abnormal signs of bleeding to staff  - Avoid use of toothpicks and dental floss  - Use electric shaver for shaving  - Use soft bristle tooth brush  - Limit straining and forceful nose blowing  Outcome: Progressing     Problem: MUSCULOSKELETAL - ADULT  Goal: Return mobility to safest level of function  Description: INTERVENTIONS:  - Assess patient stability and activity tolerance for standing, transferring and ambulating w/ or w/o assistive devices  - Assist with transfers and ambulation using safe patient handling equipment as needed  - Ensure adequate protection for wounds/incisions during mobilization  - Obtain PT/OT consults as needed  - Advance activity as appropriate  - Communicate ordered activity level and limitations with patient/family  Outcome: Progressing     Problem: PAIN - ADULT  Goal: Verbalizes/displays adequate comfort level or patient's stated pain goal  Description: INTERVENTIONS:  - Encourage pt to monitor pain and request assistance  - Assess pain using appropriate pain scale  - Administer analgesics based on type and severity of pain and evaluate response  - Implement non-pharmacological measures as appropriate and evaluate response  - Consider cultural and social influences on pain and pain management  - Manage/alleviate anxiety  - Utilize distraction and/or relaxation techniques  - Monitor for opioid side effects  - Notify MD/LIP if interventions unsuccessful or patient reports new pain  - Anticipate increased pain with activity and pre-medicate as appropriate  Outcome: Progressing     Problem: SAFETY ADULT - FALL  Goal: Free from fall  injury  Description: INTERVENTIONS:  - Assess pt frequently for physical needs  - Identify cognitive and physical deficits and behaviors that affect risk of falls.  - [place] fall precautions as indicated by assessment.  - Educate pt/family on patient safety including physical limitations  - Instruct pt to call for assistance with activity based on assessment  - Modify environment to reduce risk of injury  - Provide assistive devices as appropriate  - Consider OT/PT consult to assist with strengthening/mobility  - Encourage toileting schedule  Outcome: Progressing

## 2024-10-13 NOTE — PLAN OF CARE
Patient alert oriented times four, on o2 at 7 liters high flow with o2 saturation at  94 % Patient taking prn  meds  for pain, and falling asleep after pain med. Patient left per ambulance service, portacath heparinized as ordered. Patient left per ambulance service and hospice to meet patient at home, see discharge record

## 2024-10-14 NOTE — PAYOR COMM NOTE
--------------  DISCHARGE REVIEW    Payor: [employer] [facility] [employer]  Subscriber #:  [id number]  Authorization Number: [id number]    Admit date: 9/30/24  Admit time:  10:48 AM  Discharge Date: 10/12/2024  3:35 PM     Admitting Physician: Nixon Vergara MD  Attending Physician:  No att. providers found  Primary Care Physician: Adrian Horowitz MD       Discharge Summary Notes    No notes of this type exist for this encounter.         REVIEWER COMMENTS

## 2024-10-15 DIAGNOSIS — F33.1 MAJOR DEPRESSIVE DISORDER, RECURRENT, MODERATE (HCC): ICD-10-CM

## 2024-10-19 RX ORDER — SERTRALINE HYDROCHLORIDE 100 MG/1
100 TABLET, FILM COATED ORAL DAILY
Qty: 30 TABLET | Refills: 0 | Status: SHIPPED | OUTPATIENT
Start: 2024-10-19

## 2024-10-23 ENCOUNTER — SOCIAL WORK SERVICES (OUTPATIENT)
Dept: HEMATOLOGY/ONCOLOGY | Facility: HOSPITAL | Age: 54
End: 2024-10-23

## 2024-10-23 NOTE — PROGRESS NOTES
Patient Name:  Dontae Cortez Jr., [id number]    Date of Death: 10/21/24    Notification via : [] Death Certificate              [] Obituary         [] In Basket Message           [x] Other:       SW received notification that pt  on 10/21/24 per [facility]- (ph: [phone], fax: [phone]). SW notified MD, RN and alen.

## 2024-11-08 ENCOUNTER — MED REC SCAN ONLY (OUTPATIENT)
Dept: FAMILY MEDICINE CLINIC | Facility: CLINIC | Age: 54
End: 2024-11-08

## 2024-12-05 RX ORDER — OLANZAPINE 5 MG/1
5 TABLET ORAL NIGHTLY
Qty: 90 TABLET | Refills: 1 | OUTPATIENT
Start: 2024-12-05

## (undated) DIAGNOSIS — F41.9 ANXIETY: ICD-10-CM

## (undated) DIAGNOSIS — I10 ESSENTIAL HYPERTENSION: ICD-10-CM

## (undated) DEVICE — SUTURE VCRL SZ 3-0 L27IN ABSRB UD L24MM PS-1

## (undated) DEVICE — MEDI-VAC SUCTION HANDLE REGULAR CAPACITY: Brand: CARDINAL HEALTH

## (undated) DEVICE — TRAY SURESTEP 16 BARDEX UMETR

## (undated) DEVICE — 3M™ RED DOT™ MONITORING ELECTRODE WITH FOAM TAPE AND STICKY GEL, 50/BAG, 20/CASE, 72/PLT 2570: Brand: RED DOT™

## (undated) DEVICE — STERIS KITS

## (undated) DEVICE — CANISTER SUCT 1200CC LID BLU HRD ADPT AUTO

## (undated) DEVICE — VIOLET BRAIDED (POLYGLACTIN 910), SYNTHETIC ABSORBABLE SUTURE: Brand: COATED VICRYL

## (undated) DEVICE — 10FT COMBINED O2 DELIVERY/CO2 MONITORING. FILTER WITH MICROSTREAM TYPE LUER: Brand: DUAL ADULT NASAL CANNULA

## (undated) DEVICE — KIT VLV 5 PC AIR H2O SUCT BX ENDOGATOR CONN

## (undated) DEVICE — X-TACK ENDOSCOPIC HELIX TACKING SYSTEM (160 CM): Brand: X-TACK ENDOSCOPIC HELIX TACKING SYSTEM

## (undated) DEVICE — ACROBAT 2 CALIBRATED TIP WIRE GUIDE: Brand: ACROBAT

## (undated) DEVICE — PUMP CARTRIDGE FOR ERBEJET® 2: Brand: ERBE

## (undated) DEVICE — GRASPER ENDOSCP L200CM SHTH DIA1.9MM MINI DEV

## (undated) DEVICE — GUIDEWIRE ENDO 500CM 0.035IN

## (undated) DEVICE — GUIDEWIRE VASC L260CM DIA0.035IN TIP L4CM

## (undated) DEVICE — FILTERLINE NASAL ADULT O2/CO2

## (undated) DEVICE — Device

## (undated) DEVICE — SYRINGE MED 10ML SLIP TIP CLR BRL TAPR PLUNG

## (undated) DEVICE — STAPLER 60 RELOAD BLACK: Brand: SUREFORM

## (undated) DEVICE — FORCEP BIOPSY RJ4 LG CAP W/ND

## (undated) DEVICE — SYSTEM ENDOSCP SUT DISP OVERSTITCH

## (undated) DEVICE — BITEBLOCK ENDOSCP 60FR MAXI STRP

## (undated) DEVICE — LIGHT HANDLE

## (undated) DEVICE — DRAIN 24X5/16 4 FREE FLW ROUND

## (undated) DEVICE — SINGLE USE SUCTION VALVE MAJ-209: Brand: SINGLE USE SUCTION VALVE (STERILE)

## (undated) DEVICE — SEPTAL OCCLUDER
Type: IMPLANTABLE DEVICE | Status: NON-FUNCTIONAL
Brand: AMPLATZER™
Removed: 2024-03-20

## (undated) DEVICE — MEDI-VAC NON-CONDUCTIVE SUCTION TUBING: Brand: CARDINAL HEALTH

## (undated) DEVICE — DEVICE SPEC RTRVL RPTR 2.4MM

## (undated) DEVICE — REM POLYHESIVE ADULT PATIENT RETURN ELECTRODE: Brand: VALLEYLAB

## (undated) DEVICE — 1200CC GUARDIAN II: Brand: GUARDIAN

## (undated) DEVICE — AIRSEAL 8 MM ACCESS PORT AND LOW PROFILE OBTURATOR WITH BLADELESS OPTICAL TIP, 120 MM LENGTH: Brand: AIRSEAL

## (undated) DEVICE — TUBE MIC JEJUNAL 12FR

## (undated) DEVICE — SUTURE CINCH - LONG: Brand: X-TACK ENDOSCOPIC HELIX TACKING SYSTEM

## (undated) DEVICE — Device: Brand: DEFENDO AIR/WATER/SUCTION AND BIOPSY VALVE

## (undated) DEVICE — SUT CINCH LNG FOR X-TACK ENDOSCP HELIX TRK

## (undated) DEVICE — BALLOON HEMOSTATIC EUS LINEAR

## (undated) DEVICE — CV PACK-LF: Brand: MEDLINE INDUSTRIES, INC.

## (undated) DEVICE — NEEDLE CONTRAST INTERJECT 25G

## (undated) DEVICE — SYRINGE,TOOMEY,IRRIGATION,70CC,STERILE: Brand: MEDLINE

## (undated) DEVICE — FORCEPS BX 100CM 1.8MM RJ STD

## (undated) DEVICE — 60 ML SYRINGE REGULAR TIP: Brand: MONOJECT

## (undated) DEVICE — PLASTIC MEDIUM 16 OZ GRADUATE

## (undated) DEVICE — FENESTRATED BIPOLAR FORCEPS: Brand: ENDOWRIST

## (undated) DEVICE — UNDYED BRAIDED (POLYGLACTIN 910), SYNTHETIC ABSORBABLE SUTURE: Brand: COATED VICRYL

## (undated) DEVICE — 3M™ STERI-STRIP™ REINFORCED ADHESIVE SKIN CLOSURES, R1547, 1/2 IN X 4 IN (12 MM X 100 MM), 6 STRIPS/ENVELOPE: Brand: 3M™ STERI-STRIP™

## (undated) DEVICE — GIJAW SINGLE-USE BIOPSY FORCEPS WITH NEEDLE: Brand: GIJAW

## (undated) DEVICE — 2, DISPOSABLE SUCTION/IRRIGATOR WITHOUT DISPOSABLE TIP: Brand: STRYKEFLOW

## (undated) DEVICE — SUT OVERSTITCH 2-0 PLY-G02-020

## (undated) DEVICE — NEEDLE BIOPSY ACQUIRE OD22 GA

## (undated) DEVICE — KENDALL SCD EXPRESS SLEEVES, KNEE LENGTH, MEDIUM: Brand: KENDALL SCD

## (undated) DEVICE — SUT ETHILON 3-0 PS-1 1663H

## (undated) DEVICE — 3M™ IOBAN™ 2 ANTIMICROBIAL INCISE DRAPE 6651EZ: Brand: IOBAN™ 2

## (undated) DEVICE — SUTURE VCRL SZ 2-0 L36IN ABSRB UD L36MM CT-1

## (undated) DEVICE — SOLUTION IRRIG 1000ML 0.9% NACL USP BTL

## (undated) DEVICE — STRL PENROSE DRAIN 18" X 1/2": Brand: CARDINAL HEALTH

## (undated) DEVICE — KIT CUSTOM ENDOPROCEDURE STERIS

## (undated) DEVICE — ENDOSCOPY PACK UPPER: Brand: MEDLINE INDUSTRIES, INC.

## (undated) DEVICE — SOEHENDRA BILIARY DILATION CATHETER: Brand: SOEHENDRA

## (undated) DEVICE — STAPLER SHEATH: Brand: ENDOWRIST

## (undated) DEVICE — SUT SILK 0 FSL 680H

## (undated) DEVICE — SINGLE USE BIOPSY VALVE MAJ-210: Brand: SINGLE USE BIOPSY VALVE (STERILE)

## (undated) DEVICE — CANNULA SEAL

## (undated) DEVICE — SYRINGE MED 10ML LL TIP W/O SFTY DISP

## (undated) DEVICE — TIP-UP FENESTRATED GRASPER: Brand: ENDOWRIST

## (undated) DEVICE — GOWN,SIRUS,FABRIC-REINFORCED,X-LARGE: Brand: MEDLINE

## (undated) DEVICE — BOWLS UTILITY 16OZ

## (undated) DEVICE — OMNIPAQUE 300 POLYB 50ML

## (undated) DEVICE — STANDARD HYPODERMIC NEEDLE,POLYPROPYLENE HUB: Brand: MONOJECT

## (undated) DEVICE — ENDOSCOPY PACK - LOWER: Brand: MEDLINE INDUSTRIES, INC.

## (undated) DEVICE — FLUIDGARD® 160 ANTI-FOG SURGICAL MASK WITH ANTI-GLARE SHIELD: Brand: PRECEPT ®

## (undated) DEVICE — SUT PDS II 3-0 SH Z316H

## (undated) DEVICE — REDUCER: Brand: ENDOWRIST

## (undated) DEVICE — HYBRIDKNIFE® I-TYPE I-JET OD 2.3MM; L 1.9M: Brand: ERBE

## (undated) DEVICE — HIPEC PACK: Brand: MEDLINE INDUSTRIES, INC.

## (undated) DEVICE — BLOCK BITE MAXI 60FR

## (undated) DEVICE — NEEDLE DRIVER AND ANCHOR EXCHANGE: Brand: OVERSTITCH ENDOSCOPIC SUTURING SYSTEM

## (undated) DEVICE — GOWN,SIRUS,FABRIC-REINFORCED,LARGE: Brand: MEDLINE

## (undated) DEVICE — OVERTUBE (NON-STERILE): Brand: OVERTUBE ENDOSCOPIC ACCESS SYSTEM

## (undated) DEVICE — STERILE POLYISOPRENE POWDER-FREE SURGICAL GLOVES: Brand: PROTEXIS

## (undated) DEVICE — DEVICE SPCMN RTRVL RAPTOR MINI

## (undated) DEVICE — AIRSEAL 12 MM ACCESS PORT AND PALM GRIP OBTURATOR WITH BLADELESS OPTICAL TIP, 120 MM LENGTH: Brand: AIRSEAL

## (undated) DEVICE — DRAPE THERMAL BASIN

## (undated) DEVICE — STERILE SYNTHETIC POLYISOPRENE POWDER-FREE SURGICAL GLOVES WITH HYDROGEL COATING, SMOOTH FINISH, STRAIGHT FINGER: Brand: PROTEXIS

## (undated) DEVICE — AIRLIFE™ ADULT OXYGEN MASK VINYL, UNDER-THE-CHIN, MEDIUM CONCENTRATION MASK WITH 7 FEET (2.1 M) U/CONNECT-IT CRUSH-RESISTANT OXYGEN TUBING: Brand: AIRLIFE™

## (undated) DEVICE — STAPLER 45 RELOAD: Brand: ENDOWRIST

## (undated) DEVICE — 5 MM BABCOCKS WITH RATCHET HANDLES: Brand: ENDOPATH

## (undated) DEVICE — 3M(TM) TEGADERM(TM) TRANSPARENT FILM DRESSING FRAME STYLE 9505W: Brand: 3M™ TEGADERM™

## (undated) DEVICE — SINGLE USE DISTAL COVER MAJ-2315: Brand: SINGLE USE DISTAL COVER

## (undated) DEVICE — SYRINGE 10ML SLIP TIP

## (undated) DEVICE — GAUZE SPONGES,USP TYPE VII GAUZE, 12 PLY: Brand: CURITY

## (undated) DEVICE — MASK,FACE,MAXFLUIDPROTECT,SHIELD/TIES: Brand: MEDLINE

## (undated) DEVICE — SUTURE VLOC 90 2-0 9" 2145

## (undated) DEVICE — SINGLE USE ELECTROSURGICAL HEMOSTATIC FORCEPS: Brand: SINGLE USE ELECTROSURGICAL HEMOSTATIC FORCEPS

## (undated) DEVICE — ECLIPSE GOWN PACK: Brand: MEDLINE INDUSTRIES, INC.

## (undated) DEVICE — SYRINGE 60ML SLIP TIP

## (undated) DEVICE — SNAPLOC WIRE GUIDE LOCKING DEVICE: Brand: SNAPLOC

## (undated) DEVICE — ELECTRODE EKG W3.5XL4CM ABRAD W1.25XL1.5IN

## (undated) DEVICE — GRASPER ENDOSCP L230CM SHTH DIA2.4MM REG DEV

## (undated) DEVICE — KIT ENDO ORCAPOD 160/180/190

## (undated) DEVICE — ENSIZOR ENDOSCP SCIS 235CM

## (undated) DEVICE — CANNULA NSL AD W/ FLTR 14FT O2/CO2

## (undated) DEVICE — DISPOSABLE GRASPER: Brand: EPIX LAPAROSCOPIC GRASPER

## (undated) DEVICE — ADHESIVE MASTISOL 2/3ML

## (undated) DEVICE — TOWEL SURG OR 17X30IN BLUE

## (undated) DEVICE — SUT SILK 2-0 SH K833H

## (undated) DEVICE — BALLOON DILATATION CATHETER: Brand: HURRICANE™ RX

## (undated) DEVICE — PREMIUM WET SKIN PREP TRAY: Brand: MEDLINE INDUSTRIES, INC.

## (undated) DEVICE — SUT OVERSTITCH 2-0 POLYPR

## (undated) DEVICE — SEAL

## (undated) DEVICE — SUT SILK 3-0 SH K832H

## (undated) DEVICE — PUSHING CATHETER: Brand: COOK

## (undated) DEVICE — TROCAR: Brand: KII FIOS FIRST ENTRY

## (undated) DEVICE — APOLLO 2-0 POLYPROPYLENE SUTURE TO BE UTILIZED WITH THE OVERSTITCH ENDOSCOPIC SUTURING SYSTEM: Brand: OVERSTITCH ENDOSCOPIC SUTURING SYSTEM

## (undated) DEVICE — ENDOPATH ULTRA VERESS INSUFFLATION NEEDLES WITH LUER LOCK CONNECTORS: Brand: ENDOPATH

## (undated) DEVICE — SUT VICRYL 0 J608H

## (undated) DEVICE — ANCHOR TISSUE RETRIEVAL SYSTEM, BAG SIZE 1200 ML, PORT SIZE 15 MM: Brand: ANCHOR TISSUE RETRIEVAL SYSTEM

## (undated) DEVICE — DRAIN ATRIUM OASIS CHEST DRY

## (undated) DEVICE — #11 STERILE BLADE: Brand: POLYMER COATED BLADES

## (undated) DEVICE — CLIP HEMOSTASIS LOCKADO 16X235

## (undated) DEVICE — SCISSOR ENDO ENSIZOR 2.6X235

## (undated) DEVICE — SLEEVE KENDALL SCD EXPRESS MED

## (undated) DEVICE — HOOD ST ENDOSCOPIC DH-28GR

## (undated) DEVICE — SUT VICRYL 2-0 SH J417H

## (undated) DEVICE — MASK O2 ETCO2 ADULT

## (undated) DEVICE — SUT MONOCRYL 4-0 PS-2 Y496G

## (undated) DEVICE — TRI-LUMEN FILTERED TUBE SET WITH ACTIVATED CHARCOAL FILTER: Brand: AIRSEAL

## (undated) DEVICE — CANNULATING SPHINCTEROTOME: Brand: AUTOTOME™ RX 44

## (undated) DEVICE — COLUMN DRAPE

## (undated) DEVICE — BIOGUARD CLEANING ADAPTER

## (undated) DEVICE — CLIP HEMOLOK LARGE PURPLE

## (undated) DEVICE — TIP COVER ACCESSORY

## (undated) DEVICE — ARM DRAPE

## (undated) DEVICE — BLADELESS OBTURATOR: Brand: WECK VISTA

## (undated) DEVICE — VESSEL SEALER EXTEND: Brand: ENDOWRIST

## (undated) DEVICE — FIAPC® PROBE W/ FILTER 2200 A OD 2.3MM/6.9FR; L 2.2M/7.2FT: Brand: ERBE

## (undated) DEVICE — STAPLER 60 RELOAD GREEN: Brand: SUREFORM

## (undated) DEVICE — KIT MFLD FOR SPEC COLL

## (undated) DEVICE — PAD SACRAL PREMIUM 12X12X1

## (undated) DEVICE — MASK OXYGEN ADULT W/ C02 10FT

## (undated) DEVICE — CSTM UNIVERSAL DRAPE PK: Brand: MEDLINE INDUSTRIES, INC.

## (undated) DEVICE — DEVICE INFL 60ML 15ATM PRSS COOK SPHR

## (undated) DEVICE — RETRIEVAL BALLOON CATHETER: Brand: EXTRACTOR™ PRO RX

## (undated) DEVICE — BOWL MED MD 16OZ PLAS CAP GRAD

## (undated) DEVICE — SUT VICRYL 2-0 UR-6 J602H

## (undated) DEVICE — SUT VICRYL 0 CT-1 J946H

## (undated) DEVICE — GUIDEWIRE WITH SPRING TIP: Brand: SAFEGUIDE®

## (undated) DEVICE — Device: Brand: INTELLICART™

## (undated) DEVICE — SOLUTION  .9 1000ML BTL

## (undated) DEVICE — STAPLER 60: Brand: SUREFORM

## (undated) DEVICE — AIRSEAL 5 MM ACCESS PORT AND LOW PROFILE OBTURATOR WITH BLADELESS OPTICAL TIP, 120 MM LENGTH: Brand: AIRSEAL

## (undated) DEVICE — MINILOC 20G X 0.75 INCH WITH Y-SITE: Brand: PORT ACCESS NEEDLE

## (undated) DEVICE — ECHOTIP PROCORE, HD ULTRASOUND BIOPSY NEEDLE: Brand: ECHOTIP PROCORE

## (undated) DEVICE — CLOSURE EXOFIN 1.0ML

## (undated) DEVICE — VISUALIZATION SYSTEM: Brand: CLEARIFY

## (undated) NOTE — LETTER
Patient Name: Valentin Hoff. YOB: 1970          MRN :  [id number]  Date:  12/21/2020  Referring Physician:  Bonny Maradiaga    Progress Summary  Pt has attended 7 visits in Physical Therapy.    Dx: Right Shoulder Strain         Authoriz Assessment: Overall, the patient has made minimal changes since the initiation of care as far as his pain reports go. His objective findings are relatively normal, as indicated above.   He continues to reference his posterior deltoid, into the mid tricep a I certify the need for these services furnished under this plan of treatment and while under my care.     X___________________________________________________ Date____________________    Certification From: 25/30/6274  To:3/21/2021

## (undated) NOTE — MR AVS SNAPSHOT
[address] [facility] [address] [phone]               Thank you for choosing us for your health care visit with Alexandre Thomas MD.  We are glad to serve you and happy to provide you with this summary of your Ginger Pedraza [phone], [phone]  [address] [id number]     Phone:  [phone]    - azithromycin 250 MG Tabs  - Metoprolol Succinate ER 50 MG Tb24            MyChart     Visit MyChart  You can access your MyChart to more actively manage y

## (undated) NOTE — LETTER
[address]  [address]  Consent for Procedure/Sedation  Date: 10/4/24         Time: 1330    I hereby authorize Dr ELLA Myles, my physician and his/her assistants (if applicable), which may include medical students, residents, and/or fellows, to perform the following surgical operation/ procedure and administer such anesthesia as may be determined necessary by my physician: Cholecystostomy Tube Insertion on Dontae Goodwin Diego Champion  2.   I recognize that during the surgical operation/procedure, unforeseen conditions may necessitate additional or different procedures than those listed above.  I, therefore, further authorize and request that the above-named surgeon, assistants, or designees perform such procedures as are, in their judgment, necessary and desirable.    3.   My surgeon/physician has discussed prior to my surgery the potential benefits, risks and side effects of this procedure; the likelihood of achieving goals; and potential problems that might occur during recuperation.  They also discussed reasonable alternatives to the procedure, including risks, benefits, and side effects related to the alternatives and risks related to not receiving this procedure.  I have had all my questions answered and I acknowledge that no guarantee has been made as to the result that may be obtained.    4.   Should the need arise during my operation/procedure, which includes change of level of care prior to discharge, I also consent to the administration of blood and/or blood products.  Further, I understand that despite careful testing and screening of blood or blood products by collecting agencies, I may still be subject to ill effects as a result of receiving a blood transfusion and/or blood products.  The following are some, but not all, of the potential risks that can occur: fever and allergic reactions, hemolytic reactions, transmission of diseases such as Hepatitis, AIDS and  Cytomegalovirus (CMV) and fluid overload.  In the event that I wish to have an autologous transfusion of my own blood, or a directed donor transfusion, I will discuss this with my physician.   Check only if Refusing Blood or Blood Products  I understand refusal of blood or blood products as deemed necessary by my physician may have serious consequences to my condition to include possible death. I hereby assume responsibility for my refusal and release the hospital, its personnel, and my physicians from any responsibility for the consequences of my refusal.         o  Refuse         5.   I authorize the use of any specimen, organs, tissues, body parts or foreign objects that may be removed from my body during the operation/procedure for diagnosis, research or teaching purposes and their subsequent disposal by hospital authorities.  I also authorize the release of specimen test results and/or written reports to my treating physician on the hospital medical staff or other referring or consulting physicians involved in my care, at the discretion of the Pathologist or my treating physician.    6.   I consent to the photographing or videotaping of the operations or procedures to be performed, including appropriate portions of my body for medical, scientific, or educational purposes, provided my identity is not revealed by the pictures or by descriptive texts accompanying them.  If the procedure has been photographed/videotaped, the surgeon will obtain the original picture, image, videotape or CD.  The hospital will not be responsible for storage, release or maintenance of the picture, image, tape or CD.    7.   I consent to the presence of a  or observers in the operating room as deemed necessary by my physician or their designees.    8.   I recognize that in the event my procedure results in extended X-Ray/fluoroscopy time, I may develop a skin reaction.    9. If I have a Do Not Attempt Resuscitation  (DNAR) order in place, that status will be suspended while in the operating room, procedural suite, and during the recovery period unless otherwise explicitly stated by me (or a person authorized to consent on my behalf). The surgeon or my attending physician will determine when the applicable recovery period ends for purposes of reinstating the DNAR order.  10. Patients having a sterilization procedure: I understand that if the procedure is successful the results will be permanent and it will therefore be impossible for me to inseminate, conceive, or bear children.  I also understand that the procedure is intended to result in sterility, although the result has not been guaranteed.   11. I acknowledge that my physician has explained sedation/analgesia administration to me including the risk and benefits I consent to the administration of sedation/analgesia as may be necessary or desirable in the judgment of my physician.    I CERTIFY THAT I HAVE READ AND FULLY UNDERSTAND THE ABOVE CONSENT TO OPERATION and/or OTHER PROCEDURE.        ____________________________________       _________________________________      ______________________________  Signature of Patient         Signature of Responsible Person        Printed Name of Responsible Person        ____________________________________      _________________________________      ______________________________       Signature of Witness          Relationship to Patient                       Date                                       Time  Patient Name: Dontae Cortez Jr.  : 10/15/1970    Reviewed: 2024   Printed: 2024  Medical Record #: [id number] Page 1 of 1

## (undated) NOTE — LETTER
[address]  [address]  Authorization for Surgical Operation and Procedure     Date:___________                                                                                                         Time:__________  I hereby authorize Surgeon(s):  Raheel Ritchie MD, my physician and his/her assistants (if applicable), which may include medical students, residents, and/or fellows, to perform the following surgical operation/ procedure and administer such anesthesia as may be determined necessary by my physician:  Operation/Procedure name (s) Procedure(s):  ESOPHAGOGASTRODUODENOSCOPY (EGD) with fluoroscopy on Dontae Goodwin Diego Champion   2.   I recognize that during the surgical operation/procedure, unforeseen conditions may necessitate additional or different procedures than those listed above.  I, therefore, further authorize and request that the above-named surgeon, assistants, or designees perform such procedures as are, in their judgment, necessary and desirable.    3.   My surgeon/physician has discussed prior to my surgery the potential benefits, risks and side effects of this procedure; the likelihood of achieving goals; and potential problems that might occur during recuperation.  They also discussed reasonable alternatives to the procedure, including risks, benefits, and side effects related to the alternatives and risks related to not receiving this procedure.  I have had all my questions answered and I acknowledge that no guarantee has been made as to the result that may be obtained.    4.   Should the need arise during my operation/procedure, which includes change of level of care prior to discharge, I also consent to the administration of blood and/or blood products.  Further, I understand that despite careful testing and screening of blood or blood products by collecting agencies, I may still be subject to ill effects as a result of receiving a blood transfusion  and/or blood products.  The following are some, but not all, of the potential risks that can occur: fever and allergic reactions, hemolytic reactions, transmission of diseases such as Hepatitis, AIDS and Cytomegalovirus (CMV) and fluid overload.  In the event that I wish to have an autologous transfusion of my own blood, or a directed donor transfusion, I will discuss this with my physician.  Check only if Refusing Blood or Blood Products  I understand refusal of blood or blood products as deemed necessary by my physician may have serious consequences to my condition to include possible death. I hereby assume responsibility for my refusal and release the hospital, its personnel, and my physicians from any responsibility for the consequences of my refusal.          o  Refuse      5.   I authorize the use of any specimen, organs, tissues, body parts or foreign objects that may be removed from my body during the operation/procedure for diagnosis, research or teaching purposes and their subsequent disposal by hospital authorities.  I also authorize the release of specimen test results and/or written reports to my treating physician on the hospital medical staff or other referring or consulting physicians involved in my care, at the discretion of the Pathologist or my treating physician.    6.   I consent to the photographing or videotaping of the operations or procedures to be performed, including appropriate portions of my body for medical, scientific, or educational purposes, provided my identity is not revealed by the pictures or by descriptive texts accompanying them.  If the procedure has been photographed/videotaped, the surgeon will obtain the original picture, image, videotape or CD.  The hospital will not be responsible for storage, release or maintenance of the picture, image, tape or CD.    7.   I consent to the presence of a  or observers in the operating room as deemed necessary by my  physician or their designees.    8.   I recognize that in the event my procedure results in extended X-Ray/fluoroscopy time, I may develop a skin reaction.    9. If I have a Do Not Attempt Resuscitation (DNAR) order in place, that status will be suspended while in the operating room, procedural suite, and during the recovery period unless otherwise explicitly stated by me (or a person authorized to consent on my behalf). The surgeon or my attending physician will determine when the applicable recovery period ends for purposes of reinstating the DNAR order.  10. Patients having a sterilization procedure: I understand that if the procedure is successful the results will be permanent and it will therefore be impossible for me to inseminate, conceive, or bear children.  I also understand that the procedure is intended to result in sterility, although the result has not been guaranteed.   11. I acknowledge that my physician has explained sedation/analgesia administration to me including the risk and benefits I consent to the administration of sedation/analgesia as may be necessary or desirable in the judgment of my physician.    I CERTIFY THAT I HAVE READ AND FULLY UNDERSTAND THE ABOVE CONSENT TO OPERATION and/or OTHER PROCEDURE.    _________________________________________  __________________________________  Signature of Patient     Signature of Responsible Person         ___________________________________         Printed Name of Responsible Person           _________________________________                 Relationship to Patient  _________________________________________  ______________________________  Signature of Witness          Date  Time      Patient Name: Dontae Cortez Jr.     : 10/15/1970                 Printed: March 15, 2024     Medical Record #: [id number]                     Page 1 of 2                                    [address]  [address]    Consent for  Anesthesia    I, Dontae Goodwin Diego Arechiga. agree to be cared for by an anesthesiologist, who is specially trained to monitor me and give me medicine to put me to sleep or keep me comfortable during my procedure    I understand that my anesthesiologist is not an employee or agent of [facility] or [employer]. He or she works for [employer].    As the patient asking for anesthesia services, I agree to:  Allow the anesthesiologist (anesthesia doctor) to give me medicine and do additional procedures as necessary. Some examples are: Starting or using an “IV” to give me medicine, fluids or blood during my procedure, and having a breathing tube placed to help me breathe when I’m asleep (intubation). In the event that my heart stops working properly, I understand that my anesthesiologist will make every effort to sustain my life, unless otherwise directed by [facility] Do Not Resuscitate documents.  Tell my anesthesia doctor before my procedure:  If I am pregnant.  The last time that I ate or drank.  All of the medicines I take (including prescriptions, herbal supplements, and pills I can buy without a prescription (including street drugs/illegal medications). Failure to inform my anesthesiologist about these medicines may increase my risk of anesthetic complications.  If I am allergic to anything or have had a reaction to anesthesia before.  I understand how the anesthesia medicine will help me (benefits).  I understand that with any type of anesthesia medicine there are risks:  The most common risks are: nausea, vomiting, sore throat, muscle soreness, damage to my eyes, mouth, or teeth (from breathing tube placement).  Rare risks include: remembering what happened during my procedure, allergic reactions to medications, injury to my airway, heart, lungs, vision, nerves, or muscles and in extremely rare instances death.  My doctor has explained to me other choices available to me for  my care (alternatives).  Pregnant Patients (“epidural”):  I understand that the risks of having an epidural (medicine given into my back to help control pain during labor), include itching, low blood pressure, difficulty urinating, headache or slowing of the baby’s heart. Very rare risks include infection, bleeding, seizure, irregular heart rhythms and nerve injury.  Regional Anesthesia (“spinal”, “epidural”, & “nerve blocks”):  I understand that rare but potential complications include headache, bleeding, infection, seizure, irregular heart rhythms, and nerve injury.    I can change my mind about having anesthesia services at any time before I get the medicine.    _____________________________________________________________________________  Patient (or Representative) Signature/Relationship to Patient  Date   Time    _____________________________________________________________________________   Name (if used)    Language/Organization   Time    _____________________________________________________________________________  Anesthesiologist Signature     Date   Time  I have discussed the procedure and information above with the patient (or patient’s representative) and answered their questions. The patient or their representative has agreed to have anesthesia services.    _____________________________________________________________________________  Witness        Date   Time  I have verified that the signature is that of the patient or patient’s representative, and that it was signed before the procedure  Patient Name: Dontae Cortez      : 10/15/1970                 Printed: March 15, 2024     Medical Record #: [id number]                     Page 2 of 2

## (undated) NOTE — LETTER
Date: 10/17/2018    Patient Name: Abigail Perez. : 10/15/1970    To Whom it may concern: The above patient was seen at the [facility] for treatment of a medical condition.     This patient should be excused from attending work 10/1

## (undated) NOTE — Clinical Note
I had the pleasure of seeing Mr. Jorge Le in my office today. Please see my attached note.       Donlado Evans

## (undated) NOTE — ED AVS SNAPSHOT
THE [facility] Emergency Department in [address]  Phone:  [phone]  Fax:  [id number]    MRN: [id number]    Department:  THE [facility] Emergency Department in [place]   Date of Visit:  7/18 IF THERE IS ANY CHANGE OR WORSENING OF YOUR CONDITION, CALL YOUR PRIMARY CARE PHYSICIAN AT ONCE OR RETURN IMMEDIATELY TO THE EMERGENCY DEPARTMENT.     If you have been prescribed any medication(s), please fill your prescription right away and begin taking t

## (undated) NOTE — Clinical Note
I had the pleasure of seeing . [place] in my office today. Please see my attached note.       Rivka Beckman

## (undated) NOTE — LETTER
Date: 2/1/2023    Patient Name: Verónica Luciano. To Whom it may concern: This letter has been written at the patient's request. The above patient is being seen at the [facility] for extensive treatment of ongoing medical and surgical treatments since May 2022. Therefore, patient is required to be out of work from a surgical standpoint for 4 more weeks. Then the patient will be reevaluated at that time.       Sincerely,        Wing Escobar, [address] / Juan M Number RMA

## (undated) NOTE — LETTER
[address] [place] [address] 23 [phone]  [facility]: [phone]  FAX: [phone]    23        To whom this may concern,      Aman Preston (  10/15/1970) is under our care for the treatment of cancer. His treatment is extensive and is required to be off work until 2023 due to treatment side      effects and plan of care. Please contact us with any questions.                Dr Foster/ Steve Robles J.RN [facility]

## (undated) NOTE — LETTER
09/26/17        [address] [address]      Dear Cody Jangor,    [facility] records indicate that you have outstanding lab work and or testing that was ordered for you and has not yet been completed:          Comp Metabolic P

## (undated) NOTE — MR AVS SNAPSHOT
[address] [facility] [address] [phone]               Thank you for choosing us for your health care visit with Mayela Dooley MD.  We are glad to serve you and happy to provide you with this summary of your Imaging:  US ART LE DOPPLERS ([id number])    Instructions: To schedule an appointment for your radiology test please call Angie Jose Scheduling at [phone].        Tuesday June 06, 2017     Imaging:  US VENOUS DOPPLER LEG LEFT - DIAG IMG Allergies as of Jun 06, 2017     No Known Allergies                Today's Vital Signs     BP Pulse Temp Height Weight BMI    120/78 mmHg 60 98.6 °F (37 °C) (Oral) 74\" 281 lb 36.06 kg/m2         Current Medications          This list is accurate as of: 6/

## (undated) NOTE — LETTER
Date: 10/19/2018    Patient Name: Octaviano Herrera. To Whom it may concern: The above patient was seen at the [facility] for treatment of a medical condition. This patient may return to work 10/22/18.       Sincerely,      Chas Flaherty

## (undated) NOTE — Clinical Note
I didn't finish his dose today. If you thought he is reliable enough to do premedication, I think we could rechallenge. I am worried he would not take the premeds correctly though

## (undated) NOTE — Clinical Note
Mr. Cortez is doing well.  Please see attached note for an update on his operation and pathology. Please feel free to call me with any questions at [phone].  Thank you,  Toy [facility]

## (undated) NOTE — MR AVS SNAPSHOT
[address]  [address] [address] [facility] [address] [phone]               Thank you for choosing us for your health care visit with Teri Elliott PA-C.   We are glad to serve you and happy to provide you with this diaz Healthy cilia: Tiny, hairlike cilia sweep mucus and particles up and out of the airways. Lungs with bronchitis  Bronchitis often occurs with a cold or the flu virus.  The airways become inflamed (red and swollen). There is a deep “hacking” cough from the · Sit up or use extra pillows when in bed to help lessen coughing and congestion. · Ask your provider about using cough medicine, pain and fever medicine, or a decongestant. Antibiotics  Most cases of bronchitis are caused by cold or flu viruses.  Antibio Today's Vital Signs     BP Pulse Temp Weight          120/78 mmHg 72 97.6 °F (36.4 °C) (Oral) 281 lb           Current Medications          This list is accurate as of: 5/12/17  8:28 AM.  Always use your most recent med list.                aspirin 325 MG Tips for making healthy food choices  -   Enjoy your food, but eat less. Fully enjoy your food when eating. Don’t eat while distracted and slow down. Avoid over sized portions. Don’t eat while when you’re bored.      EAT THESE FOODS MORE OFTEN: EAT T

## (undated) NOTE — MR AVS SNAPSHOT
[address]  [address] [address]  [phone]               Thank you for choosing us for your health care visit with Ac Thapa MD.  We are glad to serve you and happy to provide you with this summary o choices. Avoid alcohol, coffee, tea, and milk. These can irritate your intestines and make symptoms worse. · Suck on ice chips if drinking makes you queasy. · Return to your normal diet slowly.  You may want to eat bland foods at first, such as rice and t Take 1 tablet (40 mg total) by mouth nightly.    Commonly known as:  PRAVACHOL                Where to Get Your Medications      These medications were sent to [facility]Y #[id number]- [place] - [address] [facility] [phone], [phone]  [address] [facility]

## (undated) NOTE — Clinical Note
HFU call completed. A TE was sent to the office for an appointment request. The patient wanted a visit today or tomorrow, 1/16/2024. Thank you.

## (undated) NOTE — LETTER
[address]  [address]  Authorization for Surgical Operation and Procedure     Date:___________                                                                                                         Time:__________  I hereby authorize Surgeon(s):  Raheel Ritchie MD Kessler, Edward, MD, my physician and his/her assistants (if applicable), which may include medical students, residents, and/or fellows, to perform the following surgical operation/ procedure and administer such anesthesia as may be determined necessary by my physician:  Operation/Procedure name (s) Procedure(s):  ESOPHAGOGASTRODUODENOSCOPY (EGD) under fluoroscopy with fistula closure under general anesthesia and Bronchoscopy performed by Chapo Madrid [facility]   2.   I recognize that during the surgical operation/procedure, unforeseen conditions may necessitate additional or different procedures than those listed above.  I, therefore, further authorize and request that the above-named surgeon, assistants, or designees perform such procedures as are, in their judgment, necessary and desirable.    3.   My surgeon/physician has discussed prior to my surgery the potential benefits, risks and side effects of this procedure; the likelihood of achieving goals; and potential problems that might occur during recuperation.  They also discussed reasonable alternatives to the procedure, including risks, benefits, and side effects related to the alternatives and risks related to not receiving this procedure.  I have had all my questions answered and I acknowledge that no guarantee has been made as to the result that may be obtained.    4.   Should the need arise during my operation/procedure, which includes change of level of care prior to discharge, I also consent to the administration of blood and/or blood products.  Further, I understand that despite careful testing and screening of blood or  blood products by collecting agencies, I may still be subject to ill effects as a result of receiving a blood transfusion and/or blood products.  The following are some, but not all, of the potential risks that can occur: fever and allergic reactions, hemolytic reactions, transmission of diseases such as Hepatitis, AIDS and Cytomegalovirus (CMV) and fluid overload.  In the event that I wish to have an autologous transfusion of my own blood, or a directed donor transfusion, I will discuss this with my physician.  Check only if Refusing Blood or Blood Products  I understand refusal of blood or blood products as deemed necessary by my physician may have serious consequences to my condition to include possible death. I hereby assume responsibility for my refusal and release the hospital, its personnel, and my physicians from any responsibility for the consequences of my refusal.          o  Refuse      5.   I authorize the use of any specimen, organs, tissues, body parts or foreign objects that may be removed from my body during the operation/procedure for diagnosis, research or teaching purposes and their subsequent disposal by hospital authorities.  I also authorize the release of specimen test results and/or written reports to my treating physician on the hospital medical staff or other referring or consulting physicians involved in my care, at the discretion of the Pathologist or my treating physician.    6.   I consent to the photographing or videotaping of the operations or procedures to be performed, including appropriate portions of my body for medical, scientific, or educational purposes, provided my identity is not revealed by the pictures or by descriptive texts accompanying them.  If the procedure has been photographed/videotaped, the surgeon will obtain the original picture, image, videotape or CD.  The hospital will not be responsible for storage, release or maintenance of the picture, image, tape or CD.     7.   I consent to the presence of a  or observers in the operating room as deemed necessary by my physician or their designees.    8.   I recognize that in the event my procedure results in extended X-Ray/fluoroscopy time, I may develop a skin reaction.    9. If I have a Do Not Attempt Resuscitation (DNAR) order in place, that status will be suspended while in the operating room, procedural suite, and during the recovery period unless otherwise explicitly stated by me (or a person authorized to consent on my behalf). The surgeon or my attending physician will determine when the applicable recovery period ends for purposes of reinstating the DNAR order.  10. Patients having a sterilization procedure: I understand that if the procedure is successful the results will be permanent and it will therefore be impossible for me to inseminate, conceive, or bear children.  I also understand that the procedure is intended to result in sterility, although the result has not been guaranteed.   11. I acknowledge that my physician has explained sedation/analgesia administration to me including the risk and benefits I consent to the administration of sedation/analgesia as may be necessary or desirable in the judgment of my physician.    I CERTIFY THAT I HAVE READ AND FULLY UNDERSTAND THE ABOVE CONSENT TO OPERATION and/or OTHER PROCEDURE.    _________________________________________  __________________________________  Signature of Patient     Signature of Responsible Person         ___________________________________         Printed Name of Responsible Person           _________________________________                 Relationship to Patient  _________________________________________  ______________________________  Signature of Witness          Date  Time      Patient Name: Dontae Cortez Jr.     : 10/15/1970                 Printed: 2024     Medical Record #: [id number]                      Page 1 of 2                                    [address]  [address]    Consent for Anesthesia    I, Dontae Nicholasriniesha Cortez Jr. agree to be cared for by an anesthesiologist, who is specially trained to monitor me and give me medicine to put me to sleep or keep me comfortable during my procedure    I understand that my anesthesiologist is not an employee or agent of [facility] or [email or web address]. He or she works for [employer].    As the patient asking for anesthesia services, I agree to:  Allow the anesthesiologist (anesthesia doctor) to give me medicine and do additional procedures as necessary. Some examples are: Starting or using an “IV” to give me medicine, fluids or blood during my procedure, and having a breathing tube placed to help me breathe when I’m asleep (intubation). In the event that my heart stops working properly, I understand that my anesthesiologist will make every effort to sustain my life, unless otherwise directed by [facility] Do Not Resuscitate documents.  Tell my anesthesia doctor before my procedure:  If I am pregnant.  The last time that I ate or drank.  All of the medicines I take (including prescriptions, herbal supplements, and pills I can buy without a prescription (including street drugs/illegal medications). Failure to inform my anesthesiologist about these medicines may increase my risk of anesthetic complications.  If I am allergic to anything or have had a reaction to anesthesia before.  I understand how the anesthesia medicine will help me (benefits).  I understand that with any type of anesthesia medicine there are risks:  The most common risks are: nausea, vomiting, sore throat, muscle soreness, damage to my eyes, mouth, or teeth (from breathing tube placement).  Rare risks include: remembering what happened during my procedure, allergic reactions to medications, injury to my airway, heart, lungs, vision,  nerves, or muscles and in extremely rare instances death.  My doctor has explained to me other choices available to me for my care (alternatives).  Pregnant Patients (“epidural”):  I understand that the risks of having an epidural (medicine given into my back to help control pain during labor), include itching, low blood pressure, difficulty urinating, headache or slowing of the baby’s heart. Very rare risks include infection, bleeding, seizure, irregular heart rhythms and nerve injury.  Regional Anesthesia (“spinal”, “epidural”, & “nerve blocks”):  I understand that rare but potential complications include headache, bleeding, infection, seizure, irregular heart rhythms, and nerve injury.    I can change my mind about having anesthesia services at any time before I get the medicine.    _____________________________________________________________________________  Patient (or Representative) Signature/Relationship to Patient  Date   Time    _____________________________________________________________________________   Name (if used)    Language/Organization   Time    _____________________________________________________________________________  Anesthesiologist Signature     Date   Time  I have discussed the procedure and information above with the patient (or patient’s representative) and answered their questions. The patient or their representative has agreed to have anesthesia services.    _____________________________________________________________________________  Witness        Date   Time  I have verified that the signature is that of the patient or patient’s representative, and that it was signed before the procedure  Patient Name: Dontae Cortez JrFritz     : 10/15/1970                 Printed: 2024     Medical Record #: [id number]                     Page 2 of 2

## (undated) NOTE — LETTER
Patient Name: Dontae Cortez Jr.        : 10/15/1970       Medical Record #: [id number]    CONSENT FOR PROCEDURES/SEDATION    Date: 2024       Time: 8:08 AM        1. I authorize the performance upon Dontae Cortez Jr. the following:    __________________________Image Guided Liver Biopsy________________________    2. I authorize Dr. Shaan Lombardi (and whomever is designated as the doctor’s assistant), to perform the above mentioned procedures.    3. If any unforeseen conditions arise during this procedure calling for additional procedures, operations, or medications (including anesthesia and blood transfusion), I  further request and authorize the doctor to do whatever he/she deems advisable in my interest.    4. I consent to the taking and reproduction of any photographs in the course of this procedure for professional purposes.    5. I consent to the administration of such sedation as may be considered necessary or advisable by the physician responsible for this service, with the exception of  _________None__________.    6. I have been informed by my doctor of the nature and purpose of this procedure/sedation, possible alternative methods of treatment, risk involved and possible complications.      Signature of Patient:  ___________________________    Signature of person authorized to consent for patient: Relationship to patient:  ___________________________    ___________________    Witness: ____________________     Date: ______________    Provider: ____________________     Date: ______________

## (undated) NOTE — LETTER
[facility]  [address] [place] [address]  [address] [id number]  PH: [phone]  FAX: [phone] [facility] Clearance Request    Gabo Dominguez MD  [address] [place] [address]. [address] [place] [address]  Via Fax: [phone]    The patient listed below is scheduled for surgery and needs the following pre-op labs and/or clearance prior to surgery. The patient has been instructed to schedule an appointment with you for a pre-op physical.      Patient: John Warren. : 10/15/1970    Surgeon:  Dr. Latasha Kang    Procedure: Laparoscopic, robotic assisted, possible open esophagogastrectomy with jejunostomy feeding tube placement    Surgery Date:  22  Location:  [facility]    inpatient    [] Hematocrit/Hemogram [] EKG     [] CBC    [] Chest X-Ray    [] CMP    [] PT/PTT    [] Urinalysis   [] MRSA Nasal Culture    [] Urinalysis with reflex  [] History and Physical    [] Urine pregnancy  [x] Anticoagulation hold    [] Qualitative HCG (blood) [x] Cardiac Clearance      Please fax to fax number indicated above when completed. Call [phone] with any questions. Thank you for your prompt attention to these requirements.     [facility]

## (undated) NOTE — LETTER
[address]  [address]  Authorization for Surgical Operation and Procedure     Date:___________                                                                                                         Time:__________  I hereby authorize, Raheel Ritchie M.D., my physician and his/her assistants (if applicable), which may include medical students, residents, and/or fellows, to perform the following surgical operation/ procedure and administer such anesthesia as may be determined necessary by my physician:  Enteroscopy with esophageal stent placement and possible jejunal tube placement under general anesthesia  on Dontae Cortez Jr.   2.   I recognize that during the surgical operation/procedure, unforeseen conditions may necessitate additional or different procedures than those listed above.  I, therefore, further authorize and request that the above-named surgeon, assistants, or designees perform such procedures as are, in their judgment, necessary and desirable.    3.   My surgeon/physician has discussed prior to my surgery the potential benefits, risks and side effects of this procedure; the likelihood of achieving goals; and potential problems that might occur during recuperation.  They also discussed reasonable alternatives to the procedure, including risks, benefits, and side effects related to the alternatives and risks related to not receiving this procedure.  I have had all my questions answered and I acknowledge that no guarantee has been made as to the result that may be obtained.    4.   Should the need arise during my operation/procedure, which includes change of level of care prior to discharge, I also consent to the administration of blood and/or blood products.  Further, I understand that despite careful testing and screening of blood or blood products by collecting agencies, I may still be subject to ill effects as a result of receiving a blood  transfusion and/or blood products.  The following are some, but not all, of the potential risks that can occur: fever and allergic reactions, hemolytic reactions, transmission of diseases such as Hepatitis, AIDS and Cytomegalovirus (CMV) and fluid overload.  In the event that I wish to have an autologous transfusion of my own blood, or a directed donor transfusion, I will discuss this with my physician.  Check only if Refusing Blood or Blood Products  I understand refusal of blood or blood products as deemed necessary by my physician may have serious consequences to my condition to include possible death. I hereby assume responsibility for my refusal and release the hospital, its personnel, and my physicians from any responsibility for the consequences of my refusal.          o  Refuse      5.   I authorize the use of any specimen, organs, tissues, body parts or foreign objects that may be removed from my body during the operation/procedure for diagnosis, research or teaching purposes and their subsequent disposal by hospital authorities.  I also authorize the release of specimen test results and/or written reports to my treating physician on the hospital medical staff or other referring or consulting physicians involved in my care, at the discretion of the Pathologist or my treating physician.    6.   I consent to the photographing or videotaping of the operations or procedures to be performed, including appropriate portions of my body for medical, scientific, or educational purposes, provided my identity is not revealed by the pictures or by descriptive texts accompanying them.  If the procedure has been photographed/videotaped, the surgeon will obtain the original picture, image, videotape or CD.  The hospital will not be responsible for storage, release or maintenance of the picture, image, tape or CD.    7.   I consent to the presence of a  or observers in the operating room as deemed  necessary by my physician or their designees.    8.   I recognize that in the event my procedure results in extended X-Ray/fluoroscopy time, I may develop a skin reaction.    9. If I have a Do Not Attempt Resuscitation (DNAR) order in place, that status will be suspended while in the operating room, procedural suite, and during the recovery period unless otherwise explicitly stated by me (or a person authorized to consent on my behalf). The surgeon or my attending physician will determine when the applicable recovery period ends for purposes of reinstating the DNAR order.  10. Patients having a sterilization procedure: I understand that if the procedure is successful the results will be permanent and it will therefore be impossible for me to inseminate, conceive, or bear children.  I also understand that the procedure is intended to result in sterility, although the result has not been guaranteed.   11. I acknowledge that my physician has explained sedation/analgesia administration to me including the risk and benefits I consent to the administration of sedation/analgesia as may be necessary or desirable in the judgment of my physician.    I CERTIFY THAT I HAVE READ AND FULLY UNDERSTAND THE ABOVE CONSENT TO OPERATION and/or OTHER PROCEDURE.    _________________________________________  __________________________________  Signature of Patient     Signature of Responsible Person         ___________________________________         Printed Name of Responsible Person           _________________________________                 Relationship to Patient  _________________________________________  ______________________________  Signature of Witness          Date  Time      Patient Name: Dontae Cortez Jr.     : 10/15/1970                 Printed: 2024     Medical Record #: [id number]                     Page 1 of 2                                    [address]   [address]    Consent for Anesthesia    I, Dontae Cortez . agree to be cared for by an anesthesiologist, who is specially trained to monitor me and give me medicine to put me to sleep or keep me comfortable during my procedure    I understand that my anesthesiologist is not an employee or agent of [facility] or [employer]. He or she works for [employer].    As the patient asking for anesthesia services, I agree to:  Allow the anesthesiologist (anesthesia doctor) to give me medicine and do additional procedures as necessary. Some examples are: Starting or using an “IV” to give me medicine, fluids or blood during my procedure, and having a breathing tube placed to help me breathe when I’m asleep (intubation). In the event that my heart stops working properly, I understand that my anesthesiologist will make every effort to sustain my life, unless otherwise directed by [facility] Do Not Resuscitate documents.  Tell my anesthesia doctor before my procedure:  If I am pregnant.  The last time that I ate or drank.  All of the medicines I take (including prescriptions, herbal supplements, and pills I can buy without a prescription (including street drugs/illegal medications). Failure to inform my anesthesiologist about these medicines may increase my risk of anesthetic complications.  If I am allergic to anything or have had a reaction to anesthesia before.  I understand how the anesthesia medicine will help me (benefits).  I understand that with any type of anesthesia medicine there are risks:  The most common risks are: nausea, vomiting, sore throat, muscle soreness, damage to my eyes, mouth, or teeth (from breathing tube placement).  Rare risks include: remembering what happened during my procedure, allergic reactions to medications, injury to my airway, heart, lungs, vision, nerves, or muscles and in extremely rare instances death.  My doctor has explained to me other choices  available to me for my care (alternatives).  Pregnant Patients (“epidural”):  I understand that the risks of having an epidural (medicine given into my back to help control pain during labor), include itching, low blood pressure, difficulty urinating, headache or slowing of the baby’s heart. Very rare risks include infection, bleeding, seizure, irregular heart rhythms and nerve injury.  Regional Anesthesia (“spinal”, “epidural”, & “nerve blocks”):  I understand that rare but potential complications include headache, bleeding, infection, seizure, irregular heart rhythms, and nerve injury.    I can change my mind about having anesthesia services at any time before I get the medicine.    _____________________________________________________________________________  Patient (or Representative) Signature/Relationship to Patient  Date   Time    _____________________________________________________________________________   Name (if used)    Language/Organization   Time    _____________________________________________________________________________  Anesthesiologist Signature     Date   Time  I have discussed the procedure and information above with the patient (or patient’s representative) and answered their questions. The patient or their representative has agreed to have anesthesia services.    _____________________________________________________________________________  Witness        Date   Time  I have verified that the signature is that of the patient or patient’s representative, and that it was signed before the procedure  Patient Name: Dontae Cortez JrFritz     : 10/15/1970                 Printed: 2024     Medical Record #: [id number]                     Page 2 of 2

## (undated) NOTE — LETTER
[address]  [address]  Authorization for Surgical Operation and Procedure     Date:___________                                                                                                         Time:__________  I hereby authorize Surgeon(s):  Raheel Ritchie MD, my physician and his/her assistants (if applicable), which may include medical students, residents, and/or fellows, to perform the following surgical operation/ procedure and administer such anesthesia as may be determined necessary by my physician:  Operation/Procedure name (s) Procedure(s):  ESOPHAGOGASTRODUODENOSCOPY with possible clipping, closure of fistula on Dontae Cortez Jr.   2.   I recognize that during the surgical operation/procedure, unforeseen conditions may necessitate additional or different procedures than those listed above.  I, therefore, further authorize and request that the above-named surgeon, assistants, or designees perform such procedures as are, in their judgment, necessary and desirable.    3.   My surgeon/physician has discussed prior to my surgery the potential benefits, risks and side effects of this procedure; the likelihood of achieving goals; and potential problems that might occur during recuperation.  They also discussed reasonable alternatives to the procedure, including risks, benefits, and side effects related to the alternatives and risks related to not receiving this procedure.  I have had all my questions answered and I acknowledge that no guarantee has been made as to the result that may be obtained.    4.   Should the need arise during my operation/procedure, which includes change of level of care prior to discharge, I also consent to the administration of blood and/or blood products.  Further, I understand that despite careful testing and screening of blood or blood products by collecting agencies, I may still be subject to ill effects as a result of receiving  a blood transfusion and/or blood products.  The following are some, but not all, of the potential risks that can occur: fever and allergic reactions, hemolytic reactions, transmission of diseases such as Hepatitis, AIDS and Cytomegalovirus (CMV) and fluid overload.  In the event that I wish to have an autologous transfusion of my own blood, or a directed donor transfusion, I will discuss this with my physician.  Check only if Refusing Blood or Blood Products  I understand refusal of blood or blood products as deemed necessary by my physician may have serious consequences to my condition to include possible death. I hereby assume responsibility for my refusal and release the hospital, its personnel, and my physicians from any responsibility for the consequences of my refusal.          o  Refuse      5.   I authorize the use of any specimen, organs, tissues, body parts or foreign objects that may be removed from my body during the operation/procedure for diagnosis, research or teaching purposes and their subsequent disposal by hospital authorities.  I also authorize the release of specimen test results and/or written reports to my treating physician on the hospital medical staff or other referring or consulting physicians involved in my care, at the discretion of the Pathologist or my treating physician.    6.   I consent to the photographing or videotaping of the operations or procedures to be performed, including appropriate portions of my body for medical, scientific, or educational purposes, provided my identity is not revealed by the pictures or by descriptive texts accompanying them.  If the procedure has been photographed/videotaped, the surgeon will obtain the original picture, image, videotape or CD.  The hospital will not be responsible for storage, release or maintenance of the picture, image, tape or CD.    7.   I consent to the presence of a  or observers in the operating room as deemed  necessary by my physician or their designees.    8.   I recognize that in the event my procedure results in extended X-Ray/fluoroscopy time, I may develop a skin reaction.    9. If I have a Do Not Attempt Resuscitation (DNAR) order in place, that status will be suspended while in the operating room, procedural suite, and during the recovery period unless otherwise explicitly stated by me (or a person authorized to consent on my behalf). The surgeon or my attending physician will determine when the applicable recovery period ends for purposes of reinstating the DNAR order.  10. Patients having a sterilization procedure: I understand that if the procedure is successful the results will be permanent and it will therefore be impossible for me to inseminate, conceive, or bear children.  I also understand that the procedure is intended to result in sterility, although the result has not been guaranteed.   11. I acknowledge that my physician has explained sedation/analgesia administration to me including the risk and benefits I consent to the administration of sedation/analgesia as may be necessary or desirable in the judgment of my physician.    I CERTIFY THAT I HAVE READ AND FULLY UNDERSTAND THE ABOVE CONSENT TO OPERATION and/or OTHER PROCEDURE.    _________________________________________  __________________________________  Signature of Patient     Signature of Responsible Person         ___________________________________         Printed Name of Responsible Person           _________________________________                 Relationship to Patient  _________________________________________  ______________________________  Signature of Witness          Date  Time      Patient Name: Dontae Cortez Jr.     : 10/15/1970                 Printed: 2024     Medical Record #: [id number]                     Page 1 of 2                                    [address]   [address]    Consent for Anesthesia    I, Dontae Cortez . agree to be cared for by an anesthesiologist, who is specially trained to monitor me and give me medicine to put me to sleep or keep me comfortable during my procedure    I understand that my anesthesiologist is not an employee or agent of [facility] or [employer]. He or she works for [employer].    As the patient asking for anesthesia services, I agree to:  Allow the anesthesiologist (anesthesia doctor) to give me medicine and do additional procedures as necessary. Some examples are: Starting or using an “IV” to give me medicine, fluids or blood during my procedure, and having a breathing tube placed to help me breathe when I’m asleep (intubation). In the event that my heart stops working properly, I understand that my anesthesiologist will make every effort to sustain my life, unless otherwise directed by [facility] Do Not Resuscitate documents.  Tell my anesthesia doctor before my procedure:  If I am pregnant.  The last time that I ate or drank.  All of the medicines I take (including prescriptions, herbal supplements, and pills I can buy without a prescription (including street drugs/illegal medications). Failure to inform my anesthesiologist about these medicines may increase my risk of anesthetic complications.  If I am allergic to anything or have had a reaction to anesthesia before.  I understand how the anesthesia medicine will help me (benefits).  I understand that with any type of anesthesia medicine there are risks:  The most common risks are: nausea, vomiting, sore throat, muscle soreness, damage to my eyes, mouth, or teeth (from breathing tube placement).  Rare risks include: remembering what happened during my procedure, allergic reactions to medications, injury to my airway, heart, lungs, vision, nerves, or muscles and in extremely rare instances death.  My doctor has explained to me other choices  available to me for my care (alternatives).  Pregnant Patients (“epidural”):  I understand that the risks of having an epidural (medicine given into my back to help control pain during labor), include itching, low blood pressure, difficulty urinating, headache or slowing of the baby’s heart. Very rare risks include infection, bleeding, seizure, irregular heart rhythms and nerve injury.  Regional Anesthesia (“spinal”, “epidural”, & “nerve blocks”):  I understand that rare but potential complications include headache, bleeding, infection, seizure, irregular heart rhythms, and nerve injury.    I can change my mind about having anesthesia services at any time before I get the medicine.    _____________________________________________________________________________  Patient (or Representative) Signature/Relationship to Patient  Date   Time    _____________________________________________________________________________   Name (if used)    Language/Organization   Time    _____________________________________________________________________________  Anesthesiologist Signature     Date   Time  I have discussed the procedure and information above with the patient (or patient’s representative) and answered their questions. The patient or their representative has agreed to have anesthesia services.    _____________________________________________________________________________  Witness        Date   Time  I have verified that the signature is that of the patient or patient’s representative, and that it was signed before the procedure  Patient Name: Dontae Cortez JrFritz     : 10/15/1970                 Printed: 2024     Medical Record #: [id number]                     Page 2 of 2

## (undated) NOTE — LETTER
[facility]  [address] [place] [address] [address]  [place] [address]  PH: [phone]  FAX: [phone]    Medical Clearance Request    Gera Ocasio MD  [address]  [address] [facility]  Via In Basket    The patient listed below is scheduled for surgery and needs the following pre-op labs and/or clearance prior to surgery. The patient has been instructed to schedule an appointment with you for a pre-op physical.      Patient: Alyssa Davenport. : 10/15/1970    Surgeon:  Dr. Malathi Farmer    Procedure: Laparoscopic, robotic assisted, possible open, esophagogastrectomy with jejunostomy feeding tube placement    Surgery Date:  22  Location:  [facility]    inpatient    [] Hematocrit/Hemogram [x] EKG     [] CBC    [] Chest X-Ray    [x] CMP    [] PT/PTT    [] Urinalysis   [] MRSA Nasal Culture    [] Urinalysis with reflex  [] History and Physical    [] Urine pregnancy  [x] Medical Clearance and anticoagulation hold    [] Qualitative HCG (blood) [] Cardiac Clearance      Please fax to fax number indicated above when completed. Call [phone] with any questions. Thank you for your prompt attention to these requirements.     [facility]

## (undated) NOTE — LETTER
Deon Love M.D., F.A.C.S.  Enmanuel Rivas M.D., F.A.C.S.  Luis Segura M.D., F.A.C.S  Jose Alfredo Umaña M.D., F.A.C.S.   Rudi Pak M.D., F.A.C.S.   Srikanth Horowitz M.D.  Gage Toribio M.D., F.A.C.S.  Aman Pedraza M.D., F.A.C.S.  Aman Castillo M.D., F.A.C.SFritz Hannah M.D., F.A.C.S.  Aman Rhodes M.D. F.A.C.S.  Vasquez Alexis M.D., F.A.C.S.   Roney Foreman M.D., F.A.C.S.  Jose Coronado M.D., F.A.C.S., F.A.C.C. Martin Moura M.D., F.A.C.S.   Sudarshan Burnham M.D., F.A.C.S.  Martin Blancas M.D., F.A.C.S.  Kory Barber M.D., F.A.C.S.  JULIAN Traylor M.D., F.A.C.S  JULIAN Mackay M.D., F.A.C.S.   William Warner M.D., F.A.C.S.  JULIAN Wright M.D. R. Anthony Perez-Tamayo, M.D. PhD.  JULIAN Burrell M.D. F A NERIS Alarcon M.D.   Pola Martínez M.D.   Nick Kessler M.D.  JULIAN Joyner D.O., F.A.CFritzS.  Sudarshan Byrne M.D., F.A.C.S.  Kory Birmingham M.D.        Welcome to [facility]    As you contemplate possible surgical treatment, it is very important to us that you understand fully what is being discussed, that all of your questions have been answered, and that your options for treatment have been fully explained.    To that end, on the following page we will ask you some questions to make certain that you understand everything which has been explained to you. Included in this understanding is that there are both surgical and nonsurgical treatments available for you, that you have options regarding where your care is given, and what doctors are involved in your care. Included in these options would, of course, be the option to elect for no treatment whatsoever. We especially want to be sure that you have had a chance to have all of your questions and  concerns answered. If there are any issues which have not been adequately addressed, we ask you to bring them forward so that we can thoroughly address them.    A patient who is fully informed and understands their condition and options for treatment, as well as potential adverse effects of treatment, is going to be a patient who receives the most benefit out of care rendered. Our goal in addition to providing excellent surgical care is to provide the necessary information to you and your family in order to make decisions which are appropriate relative to your own care.    Please take the time necessary to read and answer the questions on the next page. Again, if you have any questions, bring them forward and we will certainly address them.    Sincerely,    [facility]    _______________________  ____________________________________  Date:                                             Patient Signature  ________________________  Dontae Goodwin Diego Champion  Witness Consent Form         Revised: 2015  Patient Name: Dontae Cortez Jr.     : 10/15/1970                 Printed: 6/15/13 9:43 AM     Medical Record #: [id number]                Page: 1 of  2        [facility]  Supplemental Consent Form    A [facility] ([facility]) surgeon has met with me and explained the matter of my illness, and what treatments might be available to improve my condition. As a result of that conversation, I understand the following:    A [facility] surgeon met with me and explained, in detail, the nature of my condition for which surgery is being contemplated. The procedure to be performed  Is: removal of sternal wires            Yes _____ No _____    A [facility] surgeon has explained to me that there are alternatives to surgery which might include no surgery, medical therapy, or interventional treatment, among other options and the risks and benefits of the different treatment options:     Yes _____ No _____    A [facility] surgeon as explained to me that if I should so desire, he/she is willing to explain my case and the surgical and non-surgical options to family members:            Yes _____ No _____    A [facility] surgeon has answered all of my questions regarding the topics we have discussed. I have been invited to ask more questions:  Yes _____ No _____    A [facility] surgeon has explained to me that if I seek other options or wish treatment at another facility in [place] or [place], or anywhere in the [place], that his/her office will assist me in making such accommodations:   Yes _____ No _____    A [facility] surgeon has explained to me, that death, risk of bleeding, stroke, multi-organ failure, heart attack or other complications are risks for the proposed surgical procedure:           Yes _____ No _____    A [facility] surgeon has explained to me that I have the right to cancel or postpone the surgery at any time prior to the start of surgery:    Yes _____ No _____    The nature and options for treatment for my condition have been explained to me, in detail, by a [facility] surgeon and all questions have been answered to my satisfaction. I understand that I am not required to undergo surgery, and further, that if I so desire, I could have surgery accomplished by another surgeon or at another institution. I understand and accept that which has been explained to me. I am able to make my decisions knowingly and willfully based on the data.    ______________________   __________________________________  Date       Patient Signature  ______________________________ Dontae Cortez Jr.  Witness Consent Form   Patient Name: Dontae Cortez Jr.     DATB: 10/15/1970                 Medical Record #: [id number]    Page: 2 of 2        Printed: January 22, 2024

## (undated) NOTE — LETTER
Patient Name: Krzysztof Hanna. YOB: 1970          MRN :  [id number]  Date:  12/7/2020  Referring Physician:  Priya Chisholm    Progress Summary  Pt has attended 4 visits in Physical Therapy.    Dx: Right Shoulder Strain         Authorize Cane elevation to end range x 20 Cane elevation to end range x 20        3 way shoulder red 2x5 3 way shoulder red 2x5       Manual PT  Manual PT  Manual PT       PROM for all planes of motion at the shoulder  PROM for all planes of motion at the shoulder I certify the need for these services furnished under this plan of treatment and while under my care.     X___________________________________________________ Date____________________    Certification From: 33/9/9419  To:3/7/2021

## (undated) NOTE — LETTER
Patient Name: Sol Gonzalez.   YOB: 1970          MRN number:  [id number]  Date:  11/25/2020  Referring Physician:  Yassine Munoz         UPPER EXTREMITY EVALUATION:    Referring Physician: Dr. Armond Hollis  Diagnosis: Right Posterior Sh The patient presents with the signs and symptoms of the Rehab Diagnosis of posterior deltoid strain supported by the clinical findings of the patient's pain patterning with motion of the shoulder and inconclusive special testing and MMT.     Tu Hutchins would terry Special tests: Patient has increased pain provocation at the posterior shoulder and triceps for the cross arm, empty can, speeds test, and [place], which are inconclusive tests as the pain patterning is incorrect.       Today’s Treatment and Response Frequency / Duration: Patient will be seen for 2 x/week or a total of 12 visits over a 90 day period. Treatment will include: Manual Therapy; Therapeutic Exercises; Neuromuscular Re-education; Therapeutic Activity; Electrical Stim; Ultrasound;  Pt education

## (undated) NOTE — ED AVS SNAPSHOT
Deb Acevedo. MRN: [id number]    Department:  [facility] Emergency Department   Date of Visit:  12/19/2017           Disclosure     Insurance plans vary and the physician(s) referred by the ER may not be covered by your plan.  Please contac tell this physician (or your personal doctor if your instructions are to return to your personal doctor) about any new or lasting problems. The primary care or specialist physician will see patients referred from the [facility] Emergency Department.  Nicholas Moon

## (undated) NOTE — LETTER
[address]  [address]  Authorization for Surgical Operation and Procedure     Date:___________                                                                                                         Time:__________  I hereby authorize Surgeon(s):  Raheel Ritchie MD, my physician and his/her assistants (if applicable), which may include medical students, residents, and/or fellows, to perform the following surgical operation/ procedure and administer such anesthesia as may be determined necessary by my physician:  Operation/Procedure name (s) Procedure(s):  ESOPHAGOGASTRODUODENOSCOPY with fluoroscopy and possible clipping, closure of fistula on Dontae Cortez Jr.   2.   I recognize that during the surgical operation/procedure, unforeseen conditions may necessitate additional or different procedures than those listed above.  I, therefore, further authorize and request that the above-named surgeon, assistants, or designees perform such procedures as are, in their judgment, necessary and desirable.    3.   My surgeon/physician has discussed prior to my surgery the potential benefits, risks and side effects of this procedure; the likelihood of achieving goals; and potential problems that might occur during recuperation.  They also discussed reasonable alternatives to the procedure, including risks, benefits, and side effects related to the alternatives and risks related to not receiving this procedure.  I have had all my questions answered and I acknowledge that no guarantee has been made as to the result that may be obtained.    4.   Should the need arise during my operation/procedure, which includes change of level of care prior to discharge, I also consent to the administration of blood and/or blood products.  Further, I understand that despite careful testing and screening of blood or blood products by collecting agencies, I may still be subject to ill effects as a  result of receiving a blood transfusion and/or blood products.  The following are some, but not all, of the potential risks that can occur: fever and allergic reactions, hemolytic reactions, transmission of diseases such as Hepatitis, AIDS and Cytomegalovirus (CMV) and fluid overload.  In the event that I wish to have an autologous transfusion of my own blood, or a directed donor transfusion, I will discuss this with my physician.  Check only if Refusing Blood or Blood Products  I understand refusal of blood or blood products as deemed necessary by my physician may have serious consequences to my condition to include possible death. I hereby assume responsibility for my refusal and release the hospital, its personnel, and my physicians from any responsibility for the consequences of my refusal.          o  Refuse      5.   I authorize the use of any specimen, organs, tissues, body parts or foreign objects that may be removed from my body during the operation/procedure for diagnosis, research or teaching purposes and their subsequent disposal by hospital authorities.  I also authorize the release of specimen test results and/or written reports to my treating physician on the hospital medical staff or other referring or consulting physicians involved in my care, at the discretion of the Pathologist or my treating physician.    6.   I consent to the photographing or videotaping of the operations or procedures to be performed, including appropriate portions of my body for medical, scientific, or educational purposes, provided my identity is not revealed by the pictures or by descriptive texts accompanying them.  If the procedure has been photographed/videotaped, the surgeon will obtain the original picture, image, videotape or CD.  The hospital will not be responsible for storage, release or maintenance of the picture, image, tape or CD.    7.   I consent to the presence of a  or observers in the  operating room as deemed necessary by my physician or their designees.    8.   I recognize that in the event my procedure results in extended X-Ray/fluoroscopy time, I may develop a skin reaction.    9. If I have a Do Not Attempt Resuscitation (DNAR) order in place, that status will be suspended while in the operating room, procedural suite, and during the recovery period unless otherwise explicitly stated by me (or a person authorized to consent on my behalf). The surgeon or my attending physician will determine when the applicable recovery period ends for purposes of reinstating the DNAR order.  10. Patients having a sterilization procedure: I understand that if the procedure is successful the results will be permanent and it will therefore be impossible for me to inseminate, conceive, or bear children.  I also understand that the procedure is intended to result in sterility, although the result has not been guaranteed.   11. I acknowledge that my physician has explained sedation/analgesia administration to me including the risk and benefits I consent to the administration of sedation/analgesia as may be necessary or desirable in the judgment of my physician.    I CERTIFY THAT I HAVE READ AND FULLY UNDERSTAND THE ABOVE CONSENT TO OPERATION and/or OTHER PROCEDURE.    _________________________________________  __________________________________  Signature of Patient     Signature of Responsible Person         ___________________________________         Printed Name of Responsible Person           _________________________________                 Relationship to Patient  _________________________________________  ______________________________  Signature of Witness          Date  Time      Patient Name: Dontae Cortez Jr.     : 10/15/1970                 Printed: 2024     Medical Record #: [id number]                     Page 1 of 2                                    [address] [place] [address]  [place]  [address]    Consent for Anesthesia    I, Dontae Goodwin Diego Arechiga. agree to be cared for by an anesthesiologist, who is specially trained to monitor me and give me medicine to put me to sleep or keep me comfortable during my procedure    I understand that my anesthesiologist is not an employee or agent of [facility] or [employer]. He or she works for [employer].    As the patient asking for anesthesia services, I agree to:  Allow the anesthesiologist (anesthesia doctor) to give me medicine and do additional procedures as necessary. Some examples are: Starting or using an “IV” to give me medicine, fluids or blood during my procedure, and having a breathing tube placed to help me breathe when I’m asleep (intubation). In the event that my heart stops working properly, I understand that my anesthesiologist will make every effort to sustain my life, unless otherwise directed by [facility] Do Not Resuscitate documents.  Tell my anesthesia doctor before my procedure:  If I am pregnant.  The last time that I ate or drank.  All of the medicines I take (including prescriptions, herbal supplements, and pills I can buy without a prescription (including street drugs/illegal medications). Failure to inform my anesthesiologist about these medicines may increase my risk of anesthetic complications.  If I am allergic to anything or have had a reaction to anesthesia before.  I understand how the anesthesia medicine will help me (benefits).  I understand that with any type of anesthesia medicine there are risks:  The most common risks are: nausea, vomiting, sore throat, muscle soreness, damage to my eyes, mouth, or teeth (from breathing tube placement).  Rare risks include: remembering what happened during my procedure, allergic reactions to medications, injury to my airway, heart, lungs, vision, nerves, or muscles and in extremely rare instances death.  My doctor has  explained to me other choices available to me for my care (alternatives).  Pregnant Patients (“epidural”):  I understand that the risks of having an epidural (medicine given into my back to help control pain during labor), include itching, low blood pressure, difficulty urinating, headache or slowing of the baby’s heart. Very rare risks include infection, bleeding, seizure, irregular heart rhythms and nerve injury.  Regional Anesthesia (“spinal”, “epidural”, & “nerve blocks”):  I understand that rare but potential complications include headache, bleeding, infection, seizure, irregular heart rhythms, and nerve injury.    I can change my mind about having anesthesia services at any time before I get the medicine.    _____________________________________________________________________________  Patient (or Representative) Signature/Relationship to Patient  Date   Time    _____________________________________________________________________________   Name (if used)    Language/Organization   Time    _____________________________________________________________________________  Anesthesiologist Signature     Date   Time  I have discussed the procedure and information above with the patient (or patient’s representative) and answered their questions. The patient or their representative has agreed to have anesthesia services.    _____________________________________________________________________________  Witness        Date   Time  I have verified that the signature is that of the patient or patient’s representative, and that it was signed before the procedure  Patient Name: Dontae Cortez      : 10/15/1970                 Printed: 2024     Medical Record #: [id number]                     Page 2 of 2

## (undated) NOTE — LETTER
[address]  [address]  Authorization for Surgical Operation and Procedure     Date:___________                                                                                                         Time:__________  I hereby authorize Surgeon(s):  Chapo Madrid MD, my physician and his/her assistants (if applicable), which may include medical students, residents, and/or fellows, to perform the following surgical operation/ procedure and administer such anesthesia as may be determined necessary by my physician:  Operation/Procedure name (s) Procedure(s):  BRONCHOSCOPY FOR AIRWAY EVALUATION on Dontae Ortegacristina Champion   2.   I recognize that during the surgical operation/procedure, unforeseen conditions may necessitate additional or different procedures than those listed above.  I, therefore, further authorize and request that the above-named surgeon, assistants, or designees perform such procedures as are, in their judgment, necessary and desirable.    3.   My surgeon/physician has discussed prior to my surgery the potential benefits, risks and side effects of this procedure; the likelihood of achieving goals; and potential problems that might occur during recuperation.  They also discussed reasonable alternatives to the procedure, including risks, benefits, and side effects related to the alternatives and risks related to not receiving this procedure.  I have had all my questions answered and I acknowledge that no guarantee has been made as to the result that may be obtained.    4.   Should the need arise during my operation/procedure, which includes change of level of care prior to discharge, I also consent to the administration of blood and/or blood products.  Further, I understand that despite careful testing and screening of blood or blood products by collecting agencies, I may still be subject to ill effects as a result of receiving a blood transfusion and/or blood  products.  The following are some, but not all, of the potential risks that can occur: fever and allergic reactions, hemolytic reactions, transmission of diseases such as Hepatitis, AIDS and Cytomegalovirus (CMV) and fluid overload.  In the event that I wish to have an autologous transfusion of my own blood, or a directed donor transfusion, I will discuss this with my physician.  Check only if Refusing Blood or Blood Products  I understand refusal of blood or blood products as deemed necessary by my physician may have serious consequences to my condition to include possible death. I hereby assume responsibility for my refusal and release the hospital, its personnel, and my physicians from any responsibility for the consequences of my refusal.          o  Refuse      5.   I authorize the use of any specimen, organs, tissues, body parts or foreign objects that may be removed from my body during the operation/procedure for diagnosis, research or teaching purposes and their subsequent disposal by hospital authorities.  I also authorize the release of specimen test results and/or written reports to my treating physician on the hospital medical staff or other referring or consulting physicians involved in my care, at the discretion of the Pathologist or my treating physician.    6.   I consent to the photographing or videotaping of the operations or procedures to be performed, including appropriate portions of my body for medical, scientific, or educational purposes, provided my identity is not revealed by the pictures or by descriptive texts accompanying them.  If the procedure has been photographed/videotaped, the surgeon will obtain the original picture, image, videotape or CD.  The hospital will not be responsible for storage, release or maintenance of the picture, image, tape or CD.    7.   I consent to the presence of a  or observers in the operating room as deemed necessary by my physician or  their designees.    8.   I recognize that in the event my procedure results in extended X-Ray/fluoroscopy time, I may develop a skin reaction.    9. If I have a Do Not Attempt Resuscitation (DNAR) order in place, that status will be suspended while in the operating room, procedural suite, and during the recovery period unless otherwise explicitly stated by me (or a person authorized to consent on my behalf). The surgeon or my attending physician will determine when the applicable recovery period ends for purposes of reinstating the DNAR order.  10. Patients having a sterilization procedure: I understand that if the procedure is successful the results will be permanent and it will therefore be impossible for me to inseminate, conceive, or bear children.  I also understand that the procedure is intended to result in sterility, although the result has not been guaranteed.   11. I acknowledge that my physician has explained sedation/analgesia administration to me including the risk and benefits I consent to the administration of sedation/analgesia as may be necessary or desirable in the judgment of my physician.    I CERTIFY THAT I HAVE READ AND FULLY UNDERSTAND THE ABOVE CONSENT TO OPERATION and/or OTHER PROCEDURE.    _________________________________________  __________________________________  Signature of Patient     Signature of Responsible Person         ___________________________________         Printed Name of Responsible Person           _________________________________                 Relationship to Patient  _________________________________________  ______________________________  Signature of Witness          Date  Time      Patient Name: Dontae Cortez Jr.     : 10/15/1970                 Printed: 2024     Medical Record #: [id number]                     Page 1 of 2                                    [address]  [address]    Consent for  Anesthesia    I, Dontae Goodwin Diego Arechiga. agree to be cared for by an anesthesiologist, who is specially trained to monitor me and give me medicine to put me to sleep or keep me comfortable during my procedure    I understand that my anesthesiologist is not an employee or agent of [facility] or [employer]. He or she works for [employer].    As the patient asking for anesthesia services, I agree to:  Allow the anesthesiologist (anesthesia doctor) to give me medicine and do additional procedures as necessary. Some examples are: Starting or using an “IV” to give me medicine, fluids or blood during my procedure, and having a breathing tube placed to help me breathe when I’m asleep (intubation). In the event that my heart stops working properly, I understand that my anesthesiologist will make every effort to sustain my life, unless otherwise directed by [facility] Do Not Resuscitate documents.  Tell my anesthesia doctor before my procedure:  If I am pregnant.  The last time that I ate or drank.  All of the medicines I take (including prescriptions, herbal supplements, and pills I can buy without a prescription (including street drugs/illegal medications). Failure to inform my anesthesiologist about these medicines may increase my risk of anesthetic complications.  If I am allergic to anything or have had a reaction to anesthesia before.  I understand how the anesthesia medicine will help me (benefits).  I understand that with any type of anesthesia medicine there are risks:  The most common risks are: nausea, vomiting, sore throat, muscle soreness, damage to my eyes, mouth, or teeth (from breathing tube placement).  Rare risks include: remembering what happened during my procedure, allergic reactions to medications, injury to my airway, heart, lungs, vision, nerves, or muscles and in extremely rare instances death.  My doctor has explained to me other choices available to me for  my care (alternatives).  Pregnant Patients (“epidural”):  I understand that the risks of having an epidural (medicine given into my back to help control pain during labor), include itching, low blood pressure, difficulty urinating, headache or slowing of the baby’s heart. Very rare risks include infection, bleeding, seizure, irregular heart rhythms and nerve injury.  Regional Anesthesia (“spinal”, “epidural”, & “nerve blocks”):  I understand that rare but potential complications include headache, bleeding, infection, seizure, irregular heart rhythms, and nerve injury.    I can change my mind about having anesthesia services at any time before I get the medicine.    _____________________________________________________________________________  Patient (or Representative) Signature/Relationship to Patient  Date   Time    _____________________________________________________________________________   Name (if used)    Language/Organization   Time    _____________________________________________________________________________  Anesthesiologist Signature     Date   Time  I have discussed the procedure and information above with the patient (or patient’s representative) and answered their questions. The patient or their representative has agreed to have anesthesia services.    _____________________________________________________________________________  Witness        Date   Time  I have verified that the signature is that of the patient or patient’s representative, and that it was signed before the procedure  Patient Name: Dontae Cortez      : 10/15/1970                 Printed: 2024     Medical Record #: [id number]                     Page 2 of 2

## (undated) NOTE — LETTER
[address] [place] [address]  [facility]: [phone]  FAX: [phone]    23            To whom this may concern,     Singh Rodrigues (  10/15/1970) is under our care for the treatment of cancer. His treatment is extensive and is required to be off work until 2023 due to treatment side     effects and plan of care. Please contact us with any questions.            Dr Idris Vale

## (undated) NOTE — Clinical Note
TCM call completed. Pt has an appt set for 12/22/23. Pt reports over all he is doing better. Thank you.

## (undated) NOTE — LETTER
Date: 11/30/2022        Patient Name: Terrie Brandt. IRN # [id number]        To Whom it may concern: This letter has been written at the patient's request. The above patient is being seen at the [facility] for extensive treatment of ongoing medical and surgical treatments since May 2022. Therefore, patient is required to be out of work from a surgical standpoint for 12 more weeks. Then the patient will be reevaluated at that time.           Sincerely,    Mamta Sheppard MD

## (undated) NOTE — LETTER
Date: 5/8/2018    Patient Name: Tera Garrett. To Whom it may concern: The above patient was seen at the [facility] for treatment of a medical condition.     This patient should be excused from attending work 5/9/18    The pat

## (undated) NOTE — Clinical Note
[address] [facility], [address]  [address]  Dept: [phone]  Dept Fax: [phone]         April 18, 2017    Patient: Ileana Blair   YOB: 1970   Date of Visit: 4/18/2017       To Whom Tez Herbert

## (undated) NOTE — LETTER
Date: 3/3/2020    Patient Name: Kyle Velázquez. To Whom it may concern: This letter has been written at the patient's request. The above patient was seen at the [facility] for treatment of a medical condition.     This patient